# Patient Record
Sex: MALE | Race: WHITE | NOT HISPANIC OR LATINO | Employment: OTHER | URBAN - METROPOLITAN AREA
[De-identification: names, ages, dates, MRNs, and addresses within clinical notes are randomized per-mention and may not be internally consistent; named-entity substitution may affect disease eponyms.]

---

## 2017-01-05 ENCOUNTER — TRANSFERRED RECORDS (OUTPATIENT)
Dept: HEALTH INFORMATION MANAGEMENT | Facility: CLINIC | Age: 77
End: 2017-01-05

## 2017-01-10 ENCOUNTER — TRANSFERRED RECORDS (OUTPATIENT)
Dept: HEALTH INFORMATION MANAGEMENT | Facility: CLINIC | Age: 77
End: 2017-01-10

## 2017-01-17 ENCOUNTER — TRANSFERRED RECORDS (OUTPATIENT)
Dept: HEALTH INFORMATION MANAGEMENT | Facility: CLINIC | Age: 77
End: 2017-01-17

## 2017-01-20 ENCOUNTER — HOSPITAL ENCOUNTER (INPATIENT)
Facility: CLINIC | Age: 77
LOS: 5 days | Discharge: HOME | DRG: 682 | End: 2017-01-25
Attending: HOSPITALIST | Admitting: HOSPITALIST
Payer: MEDICARE

## 2017-01-20 ENCOUNTER — TRANSFERRED RECORDS (OUTPATIENT)
Dept: HEALTH INFORMATION MANAGEMENT | Facility: CLINIC | Age: 77
End: 2017-01-20

## 2017-01-20 DIAGNOSIS — I95.1 ORTHOSTATIC HYPOTENSION: ICD-10-CM

## 2017-01-20 DIAGNOSIS — I10 ESSENTIAL HYPERTENSION: ICD-10-CM

## 2017-01-20 DIAGNOSIS — L97.419 ULCER OF HEEL AND MIDFOOT, RIGHT, WITH UNSPECIFIED SEVERITY (H): ICD-10-CM

## 2017-01-20 DIAGNOSIS — M54.6 ACUTE MIDLINE THORACIC BACK PAIN: Primary | ICD-10-CM

## 2017-01-20 DIAGNOSIS — E11.42 TYPE 2 DIABETES MELLITUS WITH DIABETIC POLYNEUROPATHY, WITHOUT LONG-TERM CURRENT USE OF INSULIN (H): ICD-10-CM

## 2017-01-20 DIAGNOSIS — K59.00 CONSTIPATION, UNSPECIFIED CONSTIPATION TYPE: ICD-10-CM

## 2017-01-20 PROBLEM — N19 RENAL FAILURE: Status: ACTIVE | Noted: 2017-01-20

## 2017-01-20 LAB
ALBUMIN SERPL-MCNC: 2.8 G/DL (ref 3.4–5)
ALBUMIN UR-MCNC: 10 MG/DL
ALP SERPL-CCNC: 100 U/L (ref 40–150)
ALT SERPL W P-5'-P-CCNC: 26 U/L (ref 0–70)
ANION GAP SERPL CALCULATED.3IONS-SCNC: 8 MMOL/L (ref 3–14)
APPEARANCE UR: CLEAR
AST SERPL W P-5'-P-CCNC: 10 U/L (ref 0–45)
BACTERIA #/AREA URNS HPF: ABNORMAL /HPF
BILIRUB SERPL-MCNC: 0.6 MG/DL (ref 0.2–1.3)
BILIRUB UR QL STRIP: NEGATIVE
BUN SERPL-MCNC: 60 MG/DL (ref 7–30)
CALCIUM SERPL-MCNC: 8.8 MG/DL (ref 8.5–10.1)
CHLORIDE SERPL-SCNC: 101 MMOL/L (ref 94–109)
CO2 SERPL-SCNC: 24 MMOL/L (ref 20–32)
COLOR UR AUTO: YELLOW
CREAT SERPL-MCNC: 3.14 MG/DL (ref 0.66–1.25)
CREAT UR-MCNC: 188 MG/DL
DIFFERENTIAL METHOD BLD: ABNORMAL
EOSINOPHIL # BLD AUTO: 0.1 10E9/L (ref 0–0.7)
EOSINOPHIL NFR BLD AUTO: 1 %
ERYTHROCYTE [DISTWIDTH] IN BLOOD BY AUTOMATED COUNT: 15.1 % (ref 10–15)
FRACT EXCRET NA UR+SERPL-RTO: 0.7 %
GFR SERPL CREATININE-BSD FRML MDRD: 20 ML/MIN/1.7M2
GLUCOSE BLDC GLUCOMTR-MCNC: 115 MG/DL (ref 70–99)
GLUCOSE BLDC GLUCOMTR-MCNC: 143 MG/DL (ref 70–99)
GLUCOSE SERPL-MCNC: 113 MG/DL (ref 70–99)
GLUCOSE UR STRIP-MCNC: NEGATIVE MG/DL
HCT VFR BLD AUTO: 27 % (ref 40–53)
HGB BLD-MCNC: 9.1 G/DL (ref 13.3–17.7)
HGB UR QL STRIP: NEGATIVE
HYALINE CASTS #/AREA URNS LPF: 22 /LPF (ref 0–2)
KETONES UR STRIP-MCNC: NEGATIVE MG/DL
LEUKOCYTE ESTERASE UR QL STRIP: NEGATIVE
LYMPHOCYTES # BLD AUTO: 1 10E9/L (ref 0.8–5.3)
LYMPHOCYTES NFR BLD AUTO: 10 %
MCH RBC QN AUTO: 29.2 PG (ref 26.5–33)
MCHC RBC AUTO-ENTMCNC: 33.7 G/DL (ref 31.5–36.5)
MCV RBC AUTO: 87 FL (ref 78–100)
MONOCYTES # BLD AUTO: 0.8 10E9/L (ref 0–1.3)
MONOCYTES NFR BLD AUTO: 8 %
MUCOUS THREADS #/AREA URNS LPF: PRESENT /LPF
NEUTROPHILS # BLD AUTO: 7.8 10E9/L (ref 1.6–8.3)
NEUTROPHILS NFR BLD AUTO: 81 %
NITRATE UR QL: NEGATIVE
PH UR STRIP: 5 PH (ref 5–7)
PLATELET # BLD AUTO: 158 10E9/L (ref 150–450)
POTASSIUM SERPL-SCNC: 4.6 MMOL/L (ref 3.4–5.3)
PROT SERPL-MCNC: 6.6 G/DL (ref 6.8–8.8)
RBC # BLD AUTO: 3.12 10E12/L (ref 4.4–5.9)
RBC #/AREA URNS AUTO: <1 /HPF (ref 0–2)
SODIUM SERPL-SCNC: 133 MMOL/L (ref 133–144)
SODIUM UR-SCNC: 57 MMOL/L
SP GR UR STRIP: 1.01 (ref 1–1.03)
URN SPEC COLLECT METH UR: ABNORMAL
UROBILINOGEN UR STRIP-MCNC: NORMAL MG/DL (ref 0–2)
WBC # BLD AUTO: 9.7 10E9/L (ref 4–11)
WBC #/AREA URNS AUTO: 3 /HPF (ref 0–2)

## 2017-01-20 PROCEDURE — 80053 COMPREHEN METABOLIC PANEL: CPT | Performed by: HOSPITALIST

## 2017-01-20 PROCEDURE — 12000000 ZZH R&B MED SURG/OB

## 2017-01-20 PROCEDURE — 99223 1ST HOSP IP/OBS HIGH 75: CPT | Mod: AI | Performed by: HOSPITALIST

## 2017-01-20 PROCEDURE — 84300 ASSAY OF URINE SODIUM: CPT | Performed by: HOSPITALIST

## 2017-01-20 PROCEDURE — 81001 URINALYSIS AUTO W/SCOPE: CPT | Performed by: HOSPITALIST

## 2017-01-20 PROCEDURE — 25000132 ZZH RX MED GY IP 250 OP 250 PS 637: Mod: GY | Performed by: HOSPITALIST

## 2017-01-20 PROCEDURE — 00000146 ZZHCL STATISTIC GLUCOSE BY METER IP

## 2017-01-20 PROCEDURE — 85025 COMPLETE CBC W/AUTO DIFF WBC: CPT | Performed by: HOSPITALIST

## 2017-01-20 PROCEDURE — 82570 ASSAY OF URINE CREATININE: CPT | Performed by: HOSPITALIST

## 2017-01-20 PROCEDURE — A9270 NON-COVERED ITEM OR SERVICE: HCPCS | Mod: GY | Performed by: HOSPITALIST

## 2017-01-20 PROCEDURE — 25000128 H RX IP 250 OP 636: Performed by: HOSPITALIST

## 2017-01-20 PROCEDURE — 36415 COLL VENOUS BLD VENIPUNCTURE: CPT | Performed by: HOSPITALIST

## 2017-01-20 RX ORDER — CALCIUM CARBONATE 500 MG/1
500-1000 TABLET, CHEWABLE ORAL
Status: DISCONTINUED | OUTPATIENT
Start: 2017-01-20 | End: 2017-01-25 | Stop reason: HOSPADM

## 2017-01-20 RX ORDER — NICOTINE POLACRILEX 4 MG
15-30 LOZENGE BUCCAL
Status: DISCONTINUED | OUTPATIENT
Start: 2017-01-20 | End: 2017-01-25 | Stop reason: HOSPADM

## 2017-01-20 RX ORDER — HYDROCODONE BITARTRATE AND ACETAMINOPHEN 5; 325 MG/1; MG/1
1-2 TABLET ORAL EVERY 4 HOURS PRN
Status: DISCONTINUED | OUTPATIENT
Start: 2017-01-20 | End: 2017-01-25 | Stop reason: HOSPADM

## 2017-01-20 RX ORDER — LIDOCAINE 40 MG/G
CREAM TOPICAL
Status: DISCONTINUED | OUTPATIENT
Start: 2017-01-20 | End: 2017-01-25 | Stop reason: HOSPADM

## 2017-01-20 RX ORDER — AMLODIPINE BESYLATE 5 MG/1
5 TABLET ORAL DAILY
Status: DISCONTINUED | OUTPATIENT
Start: 2017-01-21 | End: 2017-01-25 | Stop reason: HOSPADM

## 2017-01-20 RX ORDER — ASCORBIC ACID 500 MG
500 TABLET ORAL AT BEDTIME
Status: DISCONTINUED | OUTPATIENT
Start: 2017-01-20 | End: 2017-01-25 | Stop reason: HOSPADM

## 2017-01-20 RX ORDER — ATORVASTATIN CALCIUM 40 MG/1
40 TABLET, FILM COATED ORAL AT BEDTIME
Status: DISCONTINUED | OUTPATIENT
Start: 2017-01-20 | End: 2017-01-25 | Stop reason: HOSPADM

## 2017-01-20 RX ORDER — ASPIRIN 81 MG/1
81 TABLET, CHEWABLE ORAL AT BEDTIME
Status: DISCONTINUED | OUTPATIENT
Start: 2017-01-20 | End: 2017-01-25 | Stop reason: HOSPADM

## 2017-01-20 RX ORDER — CLOPIDOGREL BISULFATE 75 MG/1
75 TABLET ORAL DAILY
Status: DISCONTINUED | OUTPATIENT
Start: 2017-01-21 | End: 2017-01-25 | Stop reason: HOSPADM

## 2017-01-20 RX ORDER — TAMSULOSIN HYDROCHLORIDE 0.4 MG/1
0.8 CAPSULE ORAL AT BEDTIME
Status: DISCONTINUED | OUTPATIENT
Start: 2017-01-20 | End: 2017-01-25 | Stop reason: HOSPADM

## 2017-01-20 RX ORDER — BISACODYL 10 MG
10 SUPPOSITORY, RECTAL RECTAL DAILY PRN
Status: DISCONTINUED | OUTPATIENT
Start: 2017-01-20 | End: 2017-01-25 | Stop reason: HOSPADM

## 2017-01-20 RX ORDER — ZOLPIDEM TARTRATE 5 MG/1
5 TABLET ORAL
Status: DISCONTINUED | OUTPATIENT
Start: 2017-01-20 | End: 2017-01-25 | Stop reason: HOSPADM

## 2017-01-20 RX ORDER — AMOXICILLIN 250 MG
1-2 CAPSULE ORAL 2 TIMES DAILY PRN
Status: DISCONTINUED | OUTPATIENT
Start: 2017-01-20 | End: 2017-01-25 | Stop reason: HOSPADM

## 2017-01-20 RX ORDER — ONDANSETRON 4 MG/1
4 TABLET, ORALLY DISINTEGRATING ORAL EVERY 6 HOURS PRN
Status: DISCONTINUED | OUTPATIENT
Start: 2017-01-20 | End: 2017-01-25 | Stop reason: HOSPADM

## 2017-01-20 RX ORDER — DEXTROSE MONOHYDRATE 25 G/50ML
25-50 INJECTION, SOLUTION INTRAVENOUS
Status: DISCONTINUED | OUTPATIENT
Start: 2017-01-20 | End: 2017-01-25 | Stop reason: HOSPADM

## 2017-01-20 RX ORDER — ACETAMINOPHEN 650 MG/1
650 SUPPOSITORY RECTAL EVERY 4 HOURS PRN
Status: DISCONTINUED | OUTPATIENT
Start: 2017-01-20 | End: 2017-01-25 | Stop reason: HOSPADM

## 2017-01-20 RX ORDER — ACETAMINOPHEN 325 MG/1
650 TABLET ORAL EVERY 4 HOURS PRN
Status: DISCONTINUED | OUTPATIENT
Start: 2017-01-20 | End: 2017-01-25 | Stop reason: HOSPADM

## 2017-01-20 RX ORDER — NALOXONE HYDROCHLORIDE 0.4 MG/ML
.1-.4 INJECTION, SOLUTION INTRAMUSCULAR; INTRAVENOUS; SUBCUTANEOUS
Status: DISCONTINUED | OUTPATIENT
Start: 2017-01-20 | End: 2017-01-25 | Stop reason: HOSPADM

## 2017-01-20 RX ORDER — ONDANSETRON 2 MG/ML
4 INJECTION INTRAMUSCULAR; INTRAVENOUS EVERY 6 HOURS PRN
Status: DISCONTINUED | OUTPATIENT
Start: 2017-01-20 | End: 2017-01-25 | Stop reason: HOSPADM

## 2017-01-20 RX ORDER — POLYETHYLENE GLYCOL 3350 17 G/17G
17 POWDER, FOR SOLUTION ORAL DAILY PRN
Status: DISCONTINUED | OUTPATIENT
Start: 2017-01-20 | End: 2017-01-25 | Stop reason: HOSPADM

## 2017-01-20 RX ORDER — TRAMADOL HYDROCHLORIDE 50 MG/1
50-100 TABLET ORAL 3 TIMES DAILY PRN
Status: DISCONTINUED | OUTPATIENT
Start: 2017-01-20 | End: 2017-01-20

## 2017-01-20 RX ORDER — SODIUM CHLORIDE 9 MG/ML
INJECTION, SOLUTION INTRAVENOUS CONTINUOUS
Status: DISCONTINUED | OUTPATIENT
Start: 2017-01-20 | End: 2017-01-22

## 2017-01-20 RX ORDER — HYDROMORPHONE HYDROCHLORIDE 1 MG/ML
.3-.5 INJECTION, SOLUTION INTRAMUSCULAR; INTRAVENOUS; SUBCUTANEOUS
Status: DISCONTINUED | OUTPATIENT
Start: 2017-01-20 | End: 2017-01-25 | Stop reason: HOSPADM

## 2017-01-20 RX ORDER — ATENOLOL 25 MG/1
25 TABLET ORAL 2 TIMES DAILY
Status: DISCONTINUED | OUTPATIENT
Start: 2017-01-20 | End: 2017-01-24

## 2017-01-20 RX ADMIN — TAMSULOSIN HYDROCHLORIDE 0.8 MG: 0.4 CAPSULE ORAL at 22:18

## 2017-01-20 RX ADMIN — ATENOLOL 25 MG: 25 TABLET ORAL at 22:18

## 2017-01-20 RX ADMIN — OXYCODONE HYDROCHLORIDE AND ACETAMINOPHEN 500 MG: 500 TABLET ORAL at 22:18

## 2017-01-20 RX ADMIN — ATORVASTATIN CALCIUM 40 MG: 40 TABLET, FILM COATED ORAL at 22:18

## 2017-01-20 RX ADMIN — ACETAMINOPHEN 650 MG: 325 TABLET, FILM COATED ORAL at 19:27

## 2017-01-20 RX ADMIN — SODIUM CHLORIDE: 9 INJECTION, SOLUTION INTRAVENOUS at 19:08

## 2017-01-20 RX ADMIN — ASPIRIN 81 MG 81 MG: 81 TABLET ORAL at 22:18

## 2017-01-20 ASSESSMENT — ACTIVITIES OF DAILY LIVING (ADL)
TOILETING: 0-->INDEPENDENT
DRESS: 0-->INDEPENDENT
COGNITION: 0 - NO COGNITION ISSUES REPORTED
RETIRED_EATING: 0-->INDEPENDENT
BATHING: 0-->INDEPENDENT
TRANSFERRING: 0-->INDEPENDENT
NUMBER_OF_TIMES_PATIENT_HAS_FALLEN_WITHIN_LAST_SIX_MONTHS: 2
SWALLOWING: 0-->SWALLOWS FOODS/LIQUIDS WITHOUT DIFFICULTY
AMBULATION: 0-->INDEPENDENT
RETIRED_COMMUNICATION: 0-->UNDERSTANDS/COMMUNICATES WITHOUT DIFFICULTY
FALL_HISTORY_WITHIN_LAST_SIX_MONTHS: YES

## 2017-01-20 NOTE — IP AVS SNAPSHOT
MRN:[id number]                      After Visit Summary   1/20/2017    Tad Manning    MRN: [id number]           Thank you!     Thank you for choosing [facility] for your care. Our goal is always to provide you with excellent care. Hearing back from our patients is one way we can continue to improve our services. Please take a few minutes to complete the written survey that you may receive in the mail after you visit with us. Thank you!        Patient Information     Date Of Birth          1940        About your hospital stay     You were admitted on:  January 20, 2017 You last received care in the:  [place] [address] [facility]    You were discharged on:  January 25, 2017        Reason for your hospital stay       Acute renal failure, dizziness.                  Who to Call     For medical emergencies, please call 911.  For non-urgent questions about your medical care, please call your primary care provider or clinic, [phone]          Attending Provider     Provider    Patria Dee MD Anderson, Kalia Grey, DO       Primary Care Provider Office Phone # Fax #    Gene Guevara [phone]       [place] [address]         When to contact your care team       Call your primary doctor if you have any of the following: persistent dizziness.                  After Care Instructions     Activity       Your activity upon discharge: activity as tolerated            Diet       Follow this diet upon discharge: Orders Placed This Encounter  Snacks/Supplements Adult: Nepro Oral Supplement; Between Meals  Moderate Consistent Carbohydrate Diet                  Follow-up Appointments     Follow-up and recommended labs and tests        Follow up with primary care provider, Gene Guevara, within 7-10 days to evaluate medication change and for hospital follow- up.  The following labs/tests are recommended: CBC, BMP.    Follow up with  "Dr England in 2-4 weeks as recommended                  Your next 10 appointments already scheduled     2017  2:00 PM   [facility]    [address]  [facility] [id number] [phone]            2017  9:30 AM   Return Visit with Enzo Alonso DPM   [facility]    [address] [facility] [address] [phone]              Pending Results     No orders found from 2017 to 2017.            Statement of Approval     Ordered          17 0917  I have reviewed and agree with all the recommendations and orders detailed in this document.   EFFECTIVE NOW     Approved and electronically signed by:  Patria Dee MD             Admission Information        Provider Department Dept Phone    2017 Kalia Barrientos, DO  [facility] [phone]      Your Vitals Were     Blood Pressure Pulse Temperature Respirations Weight Pulse Oximetry    147/76 mmHg 70 98.3  F (36.8  C) (Oral) 16 85.7 kg (188 lb 15 oz) 95%      MyChart Information     [facility] lets you send messages to your doctor, view your test results, renew your prescriptions, schedule appointments and more. To sign up, go to [email or web address] . Click on \"Log in\" on the left side of the screen, which will take you to the Welcome page. Then click on \"Sign up Now\" on the right side of the page.     You will be asked to enter the access code listed below, as well as some personal information. Please follow the directions to create your username and password.     Your access code is: [email or web address]  Expires: 2017  4:04 PM     Your access code will  in 90 days. If you need help or a new code, please call your [facility] clinic or [phone].        Care EveryWhere ID     This is your Care EveryWhere ID. This could be used by other organizations to access your [facility] " medical records  [id number]           Review of your medicines      START taking        Dose / Directions    bacitracin-polymyxin b ointment   Commonly known as:  POLYSPORIN   Used for:  Ulcer of heel and midfoot, right, with unspecified severity (H)        Apply topically 2 times daily   Quantity:  15 g   Refills:  0       midodrine 2.5 MG tablet   Commonly known as:  PROAMATINE   Used for:  Orthostatic hypotension        Dose:  2.5 mg   Take 1 tablet (2.5 mg) by mouth 2 times daily   Quantity:  60 tablet   Refills:  0       polyethylene glycol Packet   Commonly known as:  MIRALAX/GLYCOLAX   Used for:  Constipation, unspecified constipation type        Dose:  17 g   Take 17 g by mouth daily as needed for constipation   Quantity:  7 packet   Refills:  0       senna-docusate 8.6-50 MG per tablet   Commonly known as:  SENOKOT-S;PERICOLACE   Used for:  Constipation, unspecified constipation type        Dose:  1-2 tablet   Take 1-2 tablets by mouth 2 times daily as needed (constipation )   Quantity:  100 tablet   Refills:  0         CONTINUE these medicines which may have CHANGED, or have new prescriptions. If we are uncertain of the size of tablets/capsules you have at home, strength may be listed as something that might have changed.        Dose / Directions    atenolol 25 MG tablet   Commonly known as:  TENORMIN   This may have changed:  when to take this   Used for:  Essential hypertension        Dose:  25 mg   Take 1 tablet (25 mg) by mouth daily   Quantity:  30 tablet   Refills:  0       glipiZIDE 5 MG tablet   Commonly known as:  GLUCOTROL   This may have changed:    - medication strength  - how much to take   Used for:  Type 2 diabetes mellitus with diabetic polyneuropathy, without long-term current use of insulin (H)        Dose:  2.5 mg   Take 0.5 tablets (2.5 mg) by mouth 2 times daily (before meals)   Quantity:  30 tablet   Refills:  0       traMADol 50 MG tablet   Commonly known as:  ULTRAM   This may  have changed:    - how much to take  - when to take this  - reasons to take this   Used for:  Acute midline thoracic back pain        Dose:  50 mg   Take 1 tablet (50 mg) by mouth every 6 hours as needed   Quantity:  40 tablet   Refills:  0         CONTINUE these medicines which have NOT CHANGED        Dose / Directions    AMLODIPINE BESYLATE PO        Dose:  5 mg   Take 5 mg by mouth daily   Refills:  0       ASPIRIN PO        Dose:  81 mg   Take 81 mg by mouth daily.   Refills:  0       atorvastatin 40 MG tablet   Commonly known as:  LIPITOR   Used for:  Claudication of left lower extremity (H)        Dose:  40 mg   Take 1 tablet by mouth daily.   Quantity:  30 tablet   Refills:  1       clopidogrel 75 MG tablet   Commonly known as:  PLAVIX   Used for:  Critical lower limb ischemia        Dose:  75 mg   Take 1 tablet (75 mg) by mouth daily   Quantity:  90 tablet   Refills:  3       liraglutide 18 MG/3ML soln   Commonly known as:  VICTOZA        Dose:  1.2 mg   Inject 1.2 mg Subcutaneous daily   Refills:  0       MAGNESIUM PO        Dose:  250 mg   Take 250 mg by mouth daily   Refills:  0       NITROQUICK SL        Dose:  0.4 mg   Place 0.4 mg under the tongue as needed.   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), Diabetic ulcer of left foot due to type 2 diabetes mellitus (H)        Equipment being ordered: DH2 shoe   Quantity:  1 Device   Refills:  0       tamsulosin 0.4 MG capsule   Commonly known as:  FLOMAX        Dose:  0.8 mg   Take 0.8 mg by mouth At Bedtime   Refills:  0       VITAMIN C PO        Dose:  500 mg   Take 500 mg by mouth daily.   Refills:  0       VITAMIN D PO        Dose:  1000 Units   Take 1,000 Units by mouth daily.   Refills:  0       ZOLPIDEM TARTRATE PO        Dose:  5 mg   Take 5 mg by mouth nightly as needed   Refills:  0         STOP taking     diphenhydrAMINE-acetaminophen  MG tablet   Commonly known as:  TYLENOL PM            IBUPROFEN PO           LISINOPRIL PO           lisinopril-hydrochlorothiazide 20-25 MG per tablet   Commonly known as:  PRINZIDE/ZESTORETIC           METFORMIN HCL PO                Where to get your medicines      These medications were sent to [facility] [place] - [address]  [address] [place] [address]     Phone:  [phone]    - atenolol 25 MG tablet  - bacitracin-polymyxin b ointment  - glipiZIDE 5 MG tablet  - midodrine 2.5 MG tablet  - polyethylene glycol Packet  - senna-docusate 8.6-50 MG per tablet      Some of these will need a paper prescription and others can be bought over the counter. Ask your nurse if you have questions.     Bring a paper prescription for each of these medications    - traMADol 50 MG tablet             Protect others around you: Learn how to safely use, store and throw away your medicines at www.disposemymeds.org.             Medication List: This is a list of all your medications and when to take them. Check marks below indicate your daily home schedule. Keep this list as a reference.      Medications           Morning Afternoon Evening Bedtime As Needed    AMLODIPINE BESYLATE PO   Take 5 mg by mouth daily   Last time this was given:  5 mg on 1/25/2017  7:39 AM                                   ASPIRIN PO   Take 81 mg by mouth daily.   Last time this was given:  81 mg on 1/24/2017  9:54 PM                                   atenolol 25 MG tablet   Commonly known as:  TENORMIN   Take 1 tablet (25 mg) by mouth daily   Last time this was given:  25 mg on 1/25/2017  7:39 AM                                   atorvastatin 40 MG tablet   Commonly known as:  LIPITOR   Take 1 tablet by mouth daily.   Last time this was given:  40 mg on 1/24/2017  9:54 PM                                   bacitracin-polymyxin b ointment   Commonly known as:  POLYSPORIN   Apply topically 2 times daily   Last time this was given:  1/25/2017  7:53 AM                                       clopidogrel 75 MG tablet   Commonly known as:  PLAVIX   Take 1 tablet (75 mg) by mouth daily   Last time this was given:  75 mg on 1/25/2017  7:39 AM                                   glipiZIDE 5 MG tablet   Commonly known as:  GLUCOTROL   Take 0.5 tablets (2.5 mg) by mouth 2 times daily (before meals)   Last time this was given:  2.5 mg on 1/25/2017  7:39 AM                                      liraglutide 18 MG/3ML soln   Commonly known as:  VICTOZA   Inject 1.2 mg Subcutaneous daily   Last time this was given:  1.2 mg on 1/25/2017  7:44 AM                                   MAGNESIUM PO   Take 250 mg by mouth daily                                   midodrine 2.5 MG tablet   Commonly known as:  PROAMATINE   Take 1 tablet (2.5 mg) by mouth 2 times daily   Last time this was given:  2.5 mg on 1/25/2017 11:28 AM                                      NITROQUICK SL   Place 0.4 mg under the tongue as needed.                                   order for DME   Equipment being ordered: DH2 shoe                                polyethylene glycol Packet   Commonly known as:  MIRALAX/GLYCOLAX   Take 17 g by mouth daily as needed for constipation                                   senna-docusate 8.6-50 MG per tablet   Commonly known as:  SENOKOT-S;PERICOLACE   Take 1-2 tablets by mouth 2 times daily as needed (constipation )   Last time this was given:  1 tablet on 1/25/2017  7:51 AM                                   tamsulosin 0.4 MG capsule   Commonly known as:  FLOMAX   Take 0.8 mg by mouth At Bedtime   Last time this was given:  0.8 mg on 1/24/2017  9:54 PM                                   traMADol 50 MG tablet   Commonly known as:  ULTRAM   Take 1 tablet (50 mg) by mouth every 6 hours as needed   Last time this was given:  50 mg on 1/25/2017  7:50 AM                                   VITAMIN C PO   Take 500 mg by mouth daily.   Last time this was given:  500 mg on 1/24/2017  9:54 PM                                    VITAMIN D PO   Take 1,000 Units by mouth daily.   Last time this was given:  1,000 Units on 1/25/2017  7:38 AM                                   ZOLPIDEM TARTRATE PO   Take 5 mg by mouth nightly as needed                                             More Information        Fall Due to Dizziness, Weakness, or Loss of Balance  The symptoms that led to your fall have been evaluated. Your doctor feels it is safe for you to return home.  Many things can cause you to become dizzy. Everyone means a little something different by the word dizzy. People may describe their symptoms using these words:    It doesn t feel right in my head    It feels like spinning in my head    It seems like the room is spinning    My balance feels off    I feel lightheaded, like I am going to pass out  All these descriptions can have real causes.     Your balance mechanism is in your inner ear. Anything disturbing it can make you feel dizzy, whether it is from a cold, an injury, or many other things. Anything that causes your blood pressure to drop suddenly can make you feel lightheaded, or like you are going to faint. This is because at that moment there might not be enough blood flowing to your brain. Causes include:    Medicines    Dehydration    Standing up or bending over too quickly    Becoming overheated    Taking a hot shower or bath    Straining hard while lifting something or using the toilet    Strokes, heart attack, heart valve disease, very slow or very fast heart rate    Low blood sugar    Ear infection    Hyperventilation    Anemia    Trauma    Infection    Panic attack    Pregnancy  You may be at risk of repeat falls. Take precautions described below to prevent another fall.  Home care    If you become lightheaded or dizzy, lie down immediately or sit and lean forward with your head down. It is better to do this than fall and seriously hurt or injure yourself.    Rest today. When changing position, take a moment to  be sure any dizziness goes away before standing and walking.    If you have been prescribed a walker, be sure to use it whenever you walk, even if it is a short distance.    If you were injured during the fall, follow the advice from your doctor regarding care of your injury.    Be sure your doctor knows all the medicines, herbs, and supplements you take. Some medicines can cause dizziness.  Follow-up care  Unless given other advice, call your doctor on the next office day to advise of your fall and to schedule an appointment. You may require further treatment for the underlying condition that caused today s fall.  If X-ray or a CT scan were done, you will be notified of the results, especially if it affects treatment.  Call 911  Call 911 if any of these occur:    Trouble breathing    Confused or difficulty arousing    Fainting or loss of consciousness    Rapid or very slow heart rate    Seizure    Difficulty with speech or vision, weakness of an arm or leg    Difficulty walking or talking, loss of balance    Numbness or weakness in one side of your body, facial droop  When to seek medical advice  Call your healthcare provider right away if any of the following occur:    Another fall    Continued dizzy spells    Severe headache    Blood in vomit or stools (black or red color)    5825-6154 [employer]. [address] [place] [address], [place] [address]. All rights reserved. This information is not intended as a substitute for professional medical care. Always follow your healthcare professional's instructions.

## 2017-01-20 NOTE — IP AVS SNAPSHOT
[address], [address]    [facility] [address]    Phone:  [phone]                                       After Visit Summary   1/20/2017    Tad Manning    MRN: [id number]           After Visit Summary Signature Page     I have received my discharge instructions, and my questions have been answered. I have discussed any challenges I see with this plan with the nurse or doctor.    ..........................................................................................................................................  Patient/Patient Representative Signature      ..........................................................................................................................................  Patient Representative Print Name and Relationship to Patient    ..................................................               ................................................  Date                                            Time    ..........................................................................................................................................  Reviewed by Signature/Title    ...................................................              ..............................................  Date                                                            Time

## 2017-01-21 ENCOUNTER — APPOINTMENT (OUTPATIENT)
Dept: ULTRASOUND IMAGING | Facility: CLINIC | Age: 77
DRG: 682 | End: 2017-01-21
Attending: HOSPITALIST
Payer: MEDICARE

## 2017-01-21 ENCOUNTER — APPOINTMENT (OUTPATIENT)
Dept: CARDIOLOGY | Facility: CLINIC | Age: 77
DRG: 682 | End: 2017-01-21
Attending: HOSPITALIST
Payer: MEDICARE

## 2017-01-21 ENCOUNTER — APPOINTMENT (OUTPATIENT)
Dept: PHYSICAL THERAPY | Facility: CLINIC | Age: 77
DRG: 682 | End: 2017-01-21
Attending: HOSPITALIST
Payer: MEDICARE

## 2017-01-21 LAB
ALBUMIN SERPL-MCNC: 2.7 G/DL (ref 3.4–5)
ALP SERPL-CCNC: 101 U/L (ref 40–150)
ALT SERPL W P-5'-P-CCNC: 23 U/L (ref 0–70)
ANION GAP SERPL CALCULATED.3IONS-SCNC: 11 MMOL/L (ref 3–14)
AST SERPL W P-5'-P-CCNC: 11 U/L (ref 0–45)
BILIRUB SERPL-MCNC: 0.7 MG/DL (ref 0.2–1.3)
BUN SERPL-MCNC: 52 MG/DL (ref 7–30)
CALCIUM SERPL-MCNC: 9.3 MG/DL (ref 8.5–10.1)
CHLORIDE SERPL-SCNC: 103 MMOL/L (ref 94–109)
CK SERPL-CCNC: 47 U/L (ref 30–300)
CO2 SERPL-SCNC: 23 MMOL/L (ref 20–32)
CREAT SERPL-MCNC: 2.43 MG/DL (ref 0.66–1.25)
ERYTHROCYTE [DISTWIDTH] IN BLOOD BY AUTOMATED COUNT: 15.2 % (ref 10–15)
GFR SERPL CREATININE-BSD FRML MDRD: 26 ML/MIN/1.7M2
GLUCOSE BLDC GLUCOMTR-MCNC: 112 MG/DL (ref 70–99)
GLUCOSE BLDC GLUCOMTR-MCNC: 152 MG/DL (ref 70–99)
GLUCOSE BLDC GLUCOMTR-MCNC: 179 MG/DL (ref 70–99)
GLUCOSE SERPL-MCNC: 183 MG/DL (ref 70–99)
HBA1C MFR BLD: 7 % (ref 4.3–6)
HCT VFR BLD AUTO: 26.5 % (ref 40–53)
HGB BLD-MCNC: 8.9 G/DL (ref 13.3–17.7)
IRON SATN MFR SERPL: 15 % (ref 15–46)
IRON SERPL-MCNC: 28 UG/DL (ref 35–180)
MCH RBC QN AUTO: 29.2 PG (ref 26.5–33)
MCHC RBC AUTO-ENTMCNC: 33.6 G/DL (ref 31.5–36.5)
MCV RBC AUTO: 87 FL (ref 78–100)
PLATELET # BLD AUTO: 151 10E9/L (ref 150–450)
POTASSIUM SERPL-SCNC: 4.8 MMOL/L (ref 3.4–5.3)
PROT SERPL-MCNC: 6.6 G/DL (ref 6.8–8.8)
RBC # BLD AUTO: 3.05 10E12/L (ref 4.4–5.9)
SODIUM SERPL-SCNC: 137 MMOL/L (ref 133–144)
TIBC SERPL-MCNC: 182 UG/DL (ref 240–430)
VIT B12 SERPL-MCNC: 404 PG/ML (ref 193–986)
WBC # BLD AUTO: 6.7 10E9/L (ref 4–11)

## 2017-01-21 PROCEDURE — 85027 COMPLETE CBC AUTOMATED: CPT | Performed by: HOSPITALIST

## 2017-01-21 PROCEDURE — 25000132 ZZH RX MED GY IP 250 OP 250 PS 637: Mod: GY | Performed by: HOSPITALIST

## 2017-01-21 PROCEDURE — 82607 VITAMIN B-12: CPT | Performed by: HOSPITALIST

## 2017-01-21 PROCEDURE — 93306 TTE W/DOPPLER COMPLETE: CPT | Mod: 26 | Performed by: INTERNAL MEDICINE

## 2017-01-21 PROCEDURE — A9270 NON-COVERED ITEM OR SERVICE: HCPCS | Mod: GY | Performed by: HOSPITALIST

## 2017-01-21 PROCEDURE — 80053 COMPREHEN METABOLIC PANEL: CPT | Performed by: HOSPITALIST

## 2017-01-21 PROCEDURE — 83550 IRON BINDING TEST: CPT | Performed by: HOSPITALIST

## 2017-01-21 PROCEDURE — 12000000 ZZH R&B MED SURG/OB

## 2017-01-21 PROCEDURE — 82550 ASSAY OF CK (CPK): CPT | Performed by: HOSPITALIST

## 2017-01-21 PROCEDURE — 25000128 H RX IP 250 OP 636: Performed by: HOSPITALIST

## 2017-01-21 PROCEDURE — 83036 HEMOGLOBIN GLYCOSYLATED A1C: CPT | Performed by: HOSPITALIST

## 2017-01-21 PROCEDURE — 97161 PT EVAL LOW COMPLEX 20 MIN: CPT | Mod: GP | Performed by: PHYSICAL THERAPIST

## 2017-01-21 PROCEDURE — 40000264 ECHO COMPLETE WITH OPTISON

## 2017-01-21 PROCEDURE — 83540 ASSAY OF IRON: CPT | Performed by: HOSPITALIST

## 2017-01-21 PROCEDURE — 00000146 ZZHCL STATISTIC GLUCOSE BY METER IP

## 2017-01-21 PROCEDURE — 76770 US EXAM ABDO BACK WALL COMP: CPT

## 2017-01-21 PROCEDURE — 25500064 ZZH RX 255 OP 636: Performed by: HOSPITALIST

## 2017-01-21 PROCEDURE — 99233 SBSQ HOSP IP/OBS HIGH 50: CPT | Performed by: HOSPITALIST

## 2017-01-21 PROCEDURE — 36415 COLL VENOUS BLD VENIPUNCTURE: CPT | Performed by: HOSPITALIST

## 2017-01-21 PROCEDURE — 40000193 ZZH STATISTIC PT WARD VISIT: Performed by: PHYSICAL THERAPIST

## 2017-01-21 PROCEDURE — 25000125 ZZHC RX 250: Performed by: HOSPITALIST

## 2017-01-21 PROCEDURE — 97116 GAIT TRAINING THERAPY: CPT | Mod: GP | Performed by: PHYSICAL THERAPIST

## 2017-01-21 RX ORDER — BACITRACIN ZINC AND POLYMYXIN B SULFATE 500; 1000 [USP'U]/G; [USP'U]/G
OINTMENT TOPICAL 2 TIMES DAILY
Status: DISCONTINUED | OUTPATIENT
Start: 2017-01-21 | End: 2017-01-25 | Stop reason: HOSPADM

## 2017-01-21 RX ADMIN — AMLODIPINE BESYLATE 5 MG: 5 TABLET ORAL at 09:41

## 2017-01-21 RX ADMIN — ACETAMINOPHEN 650 MG: 325 TABLET, FILM COATED ORAL at 19:01

## 2017-01-21 RX ADMIN — SODIUM CHLORIDE: 9 INJECTION, SOLUTION INTRAVENOUS at 07:09

## 2017-01-21 RX ADMIN — CLOPIDOGREL BISULFATE 75 MG: 75 TABLET, FILM COATED ORAL at 09:41

## 2017-01-21 RX ADMIN — HUMAN ALBUMIN MICROSPHERES AND PERFLUTREN 3 ML: 10; .22 INJECTION, SOLUTION INTRAVENOUS at 13:17

## 2017-01-21 RX ADMIN — CALCIUM CARBONATE (ANTACID) CHEW TAB 500 MG 1000 MG: 500 CHEW TAB at 00:09

## 2017-01-21 RX ADMIN — ATENOLOL 25 MG: 25 TABLET ORAL at 09:41

## 2017-01-21 RX ADMIN — OXYCODONE HYDROCHLORIDE AND ACETAMINOPHEN 500 MG: 500 TABLET ORAL at 22:08

## 2017-01-21 RX ADMIN — TAMSULOSIN HYDROCHLORIDE 0.8 MG: 0.4 CAPSULE ORAL at 22:08

## 2017-01-21 RX ADMIN — ATENOLOL 25 MG: 25 TABLET ORAL at 22:08

## 2017-01-21 RX ADMIN — INSULIN ASPART 1 UNITS: 100 INJECTION, SOLUTION INTRAVENOUS; SUBCUTANEOUS at 18:56

## 2017-01-21 RX ADMIN — INSULIN ASPART 1 UNITS: 100 INJECTION, SOLUTION INTRAVENOUS; SUBCUTANEOUS at 09:40

## 2017-01-21 RX ADMIN — Medication 1 SPRAY: at 18:58

## 2017-01-21 RX ADMIN — SODIUM CHLORIDE: 9 INJECTION, SOLUTION INTRAVENOUS at 17:35

## 2017-01-21 RX ADMIN — Medication 1 SPRAY: at 14:02

## 2017-01-21 RX ADMIN — VITAMIN D, TAB 1000IU (100/BT) 1000 UNITS: 25 TAB at 13:57

## 2017-01-21 RX ADMIN — ATORVASTATIN CALCIUM 40 MG: 40 TABLET, FILM COATED ORAL at 22:08

## 2017-01-21 RX ADMIN — ONDANSETRON 4 MG: 4 TABLET, ORALLY DISINTEGRATING ORAL at 09:48

## 2017-01-21 RX ADMIN — BACITRACIN ZINC AND POLYMYXIN B SULFATE: 500; 10000 OINTMENT TOPICAL at 23:01

## 2017-01-21 RX ADMIN — CALCIUM CARBONATE (ANTACID) CHEW TAB 500 MG 1000 MG: 500 CHEW TAB at 03:28

## 2017-01-21 RX ADMIN — ASPIRIN 81 MG 81 MG: 81 TABLET ORAL at 22:08

## 2017-01-21 NOTE — PROGRESS NOTES
[facility]    Hospitalist Progress Note    Date of Service (when I saw the patient): 01/21/2017    Assessment and Plan  Tad Manning is a 76 year old male who presents with renal failure from outside facility. Admitted for further evaluation and treatment.     Acute renal failure: Patient with a creatinine of 1.57 on 10 January 2007, increased to 4.2 on January 20, 2017.              - Ultrasound of the kidneys normal, no hydronephrosis              - Fractional excretion of sodium <1 indicating pre renal.              - Urinalysis shows albumin in urine, and hyaline casts.              - Avoid nephrotoxins              - IV fluids- increase to 125cc/hr, still appears dry.   - check CK level              - Nephrology consultation requested, await recommendations.              - May need Dugan catheter if retaining urine.              - Bladder scan and catheterization as clinically indicated.   - Creatinine has trended down 4.2-->3.14-->2.43, continue to monitor.    Hypotension, syncope: Previously with lightheadedness and syncope in January and resultant back injury.  most likely due to orthostatic hypotension. As per patient, his BP meds were decreased by his PCP likely due to hypotension and dizziness.              - Echocardiogram pending              - Telemetry- no arrhythmias              - Orthostatics to follow              - IVF as above.   - Therapy eval.    Cardiac and coronary artery disease and hypertension and hyperlipidemia: Patient with history of triple bypass in 1995 per report, as well as hypertension, hyperlipidemia, peripheral vascular disease, coronary artery disease.              - Hold PTA Lisinopril and HCTZ              - Continue PTA Amlodipine 5 mg daily, Plavix 75 mg daily Lipitor 40 mg daily ASA 81mg QD and atenolol 25 mg BID              - Continue to monitor on telemetry.      Diabetes mellitus: Patient maintained on multiple medications as an  outpatient.              - Insulin sliding scale, BS in 100s mostly              - Hold PTA Victoza              - Hold PTA Metformin              - Hold PTA Glipizide    Possible history of CLL: Patient with history of chronic lymphocytic leukemia in remission per report, however, further chart review necessary.              - Monitor.    History of BPH: Stable.  Maintained on Flomax as an outpatient.              - PTA Flomax resumed    Back pain, history of fall in early January: Patient with x-rays from outside as well as CT of the abdomen without explicit fracture, see reports from care everywhere for further details.              - Pain control.              - PTA tramadol              - Hold PTA Methocarbamol    H/o Insomnia: Maintained on zolpidem prior to admission.                - PTA Zolpidem    DVT Prophylaxis: Pneumatic Compression Devices  Code Status: Full Code    Disposition: Expected discharge once renal failure improves, likely 2 days. Plan discussed with patient.    Patria Dee MD  hospitalist    Interval History  Patient was seen and examined, dizziness has improved today. No headache or nausea.   - Back pain controlled with medication.      -Data reviewed today: I reviewed all new labs and imaging results over the last 24 hours. I personally reviewed the EKG tracing showing sinus, no tachy or bradyarrhythmias on tele.    Renal US:      1. No hydronephrosis.  2. Bilateral renal cysts.  3. Enlarged prostate versus mass indenting the bladder floor.      Physical Exam  Temp: 98.5  F (36.9  C) Temp src: Oral BP: 127/60 mmHg Pulse: 70 Heart Rate: 79 Resp: 18 SpO2: 96 % O2 Device: None (Room air)    There were no vitals filed for this visit.  Vital Signs with Ranges  Temp:  [97.3  F (36.3  C)-98.6  F (37  C)] 98.5  F (36.9  C)  Pulse:  [70-89] 70  Heart Rate:  [79-86] 79  Resp:  [18] 18  BP: (120-132)/(59-64) 127/60 mmHg  SpO2:  [94 %-96 %] 96 %  I/O last 3 completed shifts:  In: 487 [P.O.:240;  I.V.:247]  Out: 1150 [Urine:1150]    Constitutional: Alert, awake. Comfortable  HEENT: PERRLA, EOMI, dry oral mucosa  Respiratory: Bilateral equal air entry, clear to auscultation. No respiratory distress.  Cardiovascular: Regular s1s2, no murmur, rub or gallop. No tachycardia.  GI: Soft, non distended, non tender, bowel tones active.  Skin/Integumen: No rash, no blister.       Medications    NaCl 125 mL/hr at 01/21/17 1245       sodium chloride (PF)  3 mL Intracatheter Q8H     amLODIPine (NORVASC) tablet 5 mg  5 mg Oral Daily     ascorbic acid (VITAMIN C) tablet 500 mg  500 mg Oral At Bedtime     atenolol (TENORMIN) tablet 25 mg  25 mg Oral BID     atorvastatin  40 mg Oral At Bedtime     clopidogrel  75 mg Oral Daily     aspirin chewable tablet 81 mg  81 mg Oral At Bedtime     tamsulosin  0.8 mg Oral At Bedtime     insulin aspart  1-7 Units Subcutaneous TID AC     insulin aspart  1-5 Units Subcutaneous At Bedtime       Data    Recent Labs  Lab 01/21/17  0740 01/20/17  2040   WBC 6.7 9.7   HGB 8.9* 9.1*   MCV 87 87    158    133   POTASSIUM 4.8 4.6   CHLORIDE 103 101   CO2 23 24   BUN 52* 60*   CR 2.43* 3.14*   ANIONGAP 11 8   JUSTINO 9.3 8.8   * 113*   ALBUMIN 2.7* 2.8*   PROTTOTAL 6.6* 6.6*   BILITOTAL 0.7 0.6   ALKPHOS 101 100   ALT 23 26   AST 11 10       Recent Results (from the past 24 hour(s))   US Renal Complete    Narrative    US RENAL COMPLETE 1/21/2017 8:15 AM     HISTORY: Assess for evidence of obstruction, mass, kidney size. No IV  contrast.    FINDINGS: The right kidney measured 12.7 cm in length with a cortical  thickness of 1.4 cm, and the left kidney 13.3 cm in length with a  cortical thickness of 1.8 cm. Bilateral renal cysts are noted  measuring 3.0 cm and 2.7 cm on the right and 5.2 cm on the left. Renal  cortical echogenicity appeared grossly normal. No hydronephrosis was  demonstrated. The urinary bladder was only partly distended. Enlarged  prostate versus 4.7 cm mass appears  to indent the floor of the  bladder.      Impression    IMPRESSION:  1. No hydronephrosis.  2. Bilateral renal cysts.  3. Enlarged prostate versus mass indenting the bladder floor.

## 2017-01-21 NOTE — H&P
[facility]    History and Physical  Hospitalist       Date of Admission:  1/20/2017  Date of Service (when I saw the patient): 01/20/2017    Assessment and Plan  Tad Manning is a 76 year old male who presents with renal failure from outside facility. Admitted for further evaluation and treatment.     Acute renal failure: Patient with a creatinine of 1.57 on 10 January 2007, increased to 4.2 on January 20, 2017.   - Ultrasound of the kidneys   - Fractional excretion of sodium   - Urinalysis   - Avoid nephrotoxins   - IV fluids.   - Nephrology consultation.   - May need Dugan catheter if retaining urine.   - Bladder scan and catheterization as clinically indicated.    Hypotension, syncope: Previously with lightheadedness and syncope in January and resultant back injury.    - Echocardiogram.    - Telemetry.    - Orthostatics   - IVF.     Cardiac and coronary artery disease and hypertension and hyperlipidemia: Patient with history of triple bypass in 1995 per report, as well as hypertension, hyperlipidemia, peripheral vascular disease, coronary artery disease.   - Hold PTA Lisinopril   - PTA Amlodipine   - PTA Plavix   - PTA Lipitor   - Hold PTA HCTZ   - PTA ASA 81mg QD.    - PTA Atenolol   - Telemetry.     Diabetes mellitus: Patient maintained on multiple medications as an outpatient.   - Insulin sliding scale.   - Hold PTA Victoza   - Hold PTA Metformin   - Hold PTA Glipizide    Possible history of CLL: Patient with history of chronic lymphocytic leukemia in remission per report, however, further chart review necessary.   - Monitor.    History of BPH: Stable.  Maintained on Flomax as an outpatient.   - PTA Flomax    Back pain, history of fall in early January: Patient with x-rays from outside as well as CT of the abdomen without explicit fracture, see reports from care everywhere for further details.   - Pain control.   - PTA tramadol   - Hold PTA Methocarbamol    H/o Insomnia: Maintained on  zolpidem prior to admission.    - PTA Zolpidem    DVT Prophylaxis: Pneumatic Compression Devices    Code: Full Code    Disposition: Inpatient.    Dr. Kalia Barrientos D.O.  [facility] Hospitalist  Pager [phone]    Primary Care Physician  Gene Guevara    Chief Complaint  Renal failure    History is obtained from the patient and medical records.     History of Present Illness  Tad Manning is a 76 year old male who presents with renal failure from outside facility. Admitted for further evaluation and treatment.  Per report, patient with back pain and dizziness initially presenting to the emergency department and subsequently transferred for further evaluation.  Patient was seen previously in the clinic in December for lightheadedness and falls as well as trauma to his back, associated with ongoing back pain.  Lumbar x-rays as well as a CT of the abdomen was completed previously without evidence of fracture, see report for further details and care everywhere.  The patient continued to report decreased appetite, insomnia, intermittent lightheadedness, as well as back pain.  The patient denies chest pain, shortness of breath, abdominal pain, vomiting, blood in the urine or stool, rash, significant lower extremity edema, or headache.    Past Medical History   I have reviewed this patient's medical history and updated it with pertinent information if needed.   Past Medical History   Diagnosis Date     Coronary artery disease      triple bypass 1995     Diabetes mellitus (H)      History of blood transfusion      Hypertension      Malignant neoplasm (H)      CLL     Hyperlipidaemia      Sebaceous cyst      BMI 30.0-30.9,adult      BPH (benign prostatic hypertrophy)      Cellulitis      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)      Noninflammatory pericardial effusion      Osteomyelitis of left foot (H)      PVD (peripheral vascular disease) (H)      Diabetic  polyneuropathy (H)      ASCVD (arteriosclerotic cardiovascular disease)      Arthritis        Past Surgical History  I have reviewed this patient's surgical history and updated it with pertinent information if needed.  Past Surgical History   Procedure Laterality Date     Tonsillectomy       Bone marrow biopsy, bone specimen, needle/trocar  10/19/2012     Procedure: BIOPSY BONE MARROW;  BONE MARROW BIOPSY  (CONSCIOUS SED);  Surgeon: Ramon Quesada MD;  Location:  GI     Vascular surgery       ptcr legs     Bypass graft femoropopliteal  1/10/2014     Procedure: BYPASS GRAFT FEMOROPOPLITEAL;  Left above knee popliteal to  Below knee popliteal bypass using transverse saphenous vein graft. Left 2nd Toe amputation;  Surgeon: Amador Vick MD;  Location:  OR     Amputate toe(s)  1/10/2014     Procedure: AMPUTATE TOE(S);;  Surgeon: Amador Vick MD;  Location:  OR     Graft vein from extremity (location)  1/10/2014     Procedure: GRAFT VEIN FROM EXTREMITY (LOCATION);;  Surgeon: Amador Vick MD;  Location:  OR     Cardiac surgery       angioplasty with stent, triple bypass     Excise cyst generic (location) N/A 2/1/2016     Procedure: EXCISE CYST GENERIC (LOCATION);  Surgeon: Amador Vick MD;  Location:  [facility]       Prior to Admission Medications  Prior to Admission Medications   Prescriptions Last Dose Informant Patient Reported? Taking?   AMLODIPINE BESYLATE PO   Yes No   Sig: Take 5 mg by mouth daily    ASPIRIN PO   Yes No   Sig: Take 81 mg by mouth daily.   ATENOLOL PO   Yes No   Sig: Take 25 mg by mouth 2 times daily.   Ascorbic Acid (VITAMIN C PO)   Yes No   Sig: Take 500 mg by mouth daily.   Cholecalciferol (VITAMIN D PO)   Yes No   Sig: Take 1,000 Units by mouth daily.    GLIPIZIDE PO   Yes No   Sig: Take 5 mg by mouth 2 times daily (before meals).     IBUPROFEN PO   Yes No   LISINOPRIL PO   Yes No   Sig: Take 20 mg by mouth daily   MAGNESIUM PO   Yes No   METFORMIN HCL PO   Yes  No   Sig: Take 1,000 mg by mouth 2 times daily (with meals)    METHOCARBAMOL PO   Yes No   Nitroglycerin (NITROQUICK SL)   Yes No   Sig: Place 0.4 mg under the tongue as needed.   ZOLPIDEM TARTRATE PO   Yes No   Sig: Take 5 mg by mouth nightly as needed    atorvastatin (LIPITOR) 40 MG tablet   No No   Sig: Take 1 tablet by mouth daily.   clopidogrel (PLAVIX) 75 MG tablet   No No   Sig: Take 1 tablet (75 mg) by mouth daily   lidocaine (LIDODERM) 5 % Patch   Yes No   Sig: Place 1 patch onto the skin every 24 hours   liraglutide (VICTOZA) 18 MG/3ML soln   Yes No   Sig: Inject 1.8 mg Subcutaneous daily   lisinopril-hydrochlorothiazide (PRINZIDE,ZESTORETIC) 20-25 MG per tablet   Yes No   Sig: Take 1 tablet by mouth daily.   order for DME   No No   Sig: Equipment being ordered: DH2 shoe   tamsulosin (FLOMAX) 0.4 MG 24 hr capsule   Yes No   Sig: Take 0.4 mg by mouth daily      Facility-Administered Medications: None     Allergies  Allergies   Allergen Reactions     Blood Transfusion Related (Informational Only) Other (See Comments)     Patient has a history of a clinically significant antibody against RBC antigens.  A delay in compatible RBCs may occur.        Social History  I have reviewed this patient's social history and updated it with pertinent information if needed. Tad Manning  reports that he quit smoking about 21 years ago. His smoking use included Cigarettes. He has a 60 pack-year smoking history. He has never used smokeless tobacco. He reports that he drinks alcohol. He reports that he does not use illicit drugs.    Family History  I have reviewed this patient's family history and updated it with pertinent information if needed.   Family History   Problem Relation Age of Onset     CANCER Mother      leukemia       Review of Systems  The 10 point Review of Systems is negative other than noted in the HPI or here.     Physical Exam                     Vital Signs with Ranges     0 lbs 0 oz    GENERAL:  Alert and oriented. NAD. Conversational, appropriate.   HEENT: Normocephalic. EOMI. No icterus or injection. Nares normal.   LUNGS: Clear to auscultation. No dyspnea at rest.   HEART: Regular rate. Extremities perfused.   ABDOMEN: Soft, nontender, and nondistended. Positive bowel sounds.   EXTREMITIES: No LE edema noted.   NEUROLOGIC: Moves extremities x4 on command. No acute focal neurologic abnormalities noted. Mild tenderness to low back.     Data  Data reviewed today:  I personally reviewed both laboratory and imaging data.   No lab results found in last 7 days.    No results found for this or any previous visit (from the past 24 hour(s)).

## 2017-01-21 NOTE — PROGRESS NOTES
" 01/21/17 1449   Quick Adds   Type of Visit Initial PT Evaluation   Living Environment   Lives With other (see comments)  (lives with female roommate)   Living Arrangements apartment   Home Accessibility no concerns   Transportation Available car   Living Environment Comment Lives in 3rd floor apartment, has elevator access   Self-Care   Usual Activity Tolerance good   Current Activity Tolerance good   Equipment Currently Used at Home walker, rolling   Activity/Exercise/Self-Care Comment Just recenty started using FWW, was starting OPPT for back pain   Functional Level Prior   Ambulation 1-->assistive equipment   Transferring 1-->assistive equipment   Toileting 0-->independent   Bathing 0-->independent   Dressing 0-->independent   Eating 0-->independent   Communication 0-->understands/communicates without difficulty   Swallowing 0-->swallows foods/liquids without difficulty   Cognition 0 - no cognition issues reported   Fall history within last six months yes   Number of times patient has fallen within last six months 2   Which of the above functional risks had a recent onset or change? none   Prior Functional Level Comment mod I with FWW, IND with ADLs   General Information   Onset of Illness/Injury or Date of Surgery - Date 01/20/17   Referring Physician Kalia Barrientos, DO   Patient/Family Goals Statement to have less back pain   Pertinent History of Current Problem (include personal factors and/or comorbidities that impact the POC) Pt is a 76 year old male who presents with renal failure from outside facility. Admitted for further evaluation and treatment, PMH: ARF, hypotension, CAD s/p x3 bypass 1995, DM, \"possible h/o CLL\"   Precautions/Limitations fall precautions   General Info Comments Activity: Up with assist    Cognitive Status Examination   Orientation orientation to person, place and time   Level of Consciousness alert   Follows Commands and Answers Questions 100% of the time   Personal Safety and " "Judgment intact   Memory intact   Pain Assessment   Patient Currently in Pain (back pain 3/10)   Posture    Posture Forward head position;Protracted shoulders   Range of Motion (ROM)   ROM Comment WFL in [facility]   Strength   Strength Comments WFL in [facility]   Bed Mobility   Bed Mobility Comments IND with supine <> sit   Transfer Skills   Transfer Comments Supervision with sit <> stand   Gait   Gait Comments Supervision with FWW   Balance   Balance Comments good, no LOB/lateral path deviations noted   Sensory Examination   Sensory Perception Comments baseline neuropathy in [facility]   General Therapy Interventions   Planned Therapy Interventions gait training;transfer training;risk factor education;home program guidelines;progressive activity/exercise   Clinical Impression   Criteria for Skilled Therapeutic Intervention yes, treatment indicated   PT Diagnosis impaired gait   Influenced by the following impairments back pain   Functional limitations due to impairments ambulation distance   Clinical Presentation Stable/Uncomplicated   Clinical Presentation Rationale uncomplicated work up thus far   Clinical Decision Making (Complexity) Low complexity   Therapy Frequency` daily   Predicted Duration of Therapy Intervention (days/wks) Eval + 1 treat   Anticipated Discharge Disposition Home with Outpatient Therapy   Risk & Benefits of therapy have been explained Yes   Patient, Family & other staff in agreement with plan of care Yes   [facility] [employer] TM \"6 Clicks\"   2016, Trustees of [facility], under license to [employer].  All rights reserved.   6 Clicks Short Forms Basic Mobility Inpatient Short Form   [facility] [employer]  \"6 Clicks\" V.2 Basic Mobility Inpatient Short Form   1. Turning from your back to your side while in a flat bed without using bedrails? 4 - None   2. Moving from lying on your back to sitting on the side of a flat bed without using bedrails? 4 - None   3. Moving to and from a bed to a chair " (including a wheelchair)? 4 - None   4. Standing up from a chair using your arms (e.g., wheelchair, or bedside chair)? 4 - None   5. To walk in hospital room? 4 - None   6. Climbing 3-5 steps with a railing? 3 - A Little   Basic Mobility Raw Score (Score out of 24.Lower scores equate to lower levels of function) 23   Total Evaluation Time   Total Evaluation Time (Minutes) 13

## 2017-01-21 NOTE — PLAN OF CARE
Problem: Goal Outcome Summary  Goal: Goal Outcome Summary  Outcome: No Change  Patient A&Ox4. Denies pain. VSS. A1. Voiding per BR. Adequate output. IVF @ 75ml/hr. Renal diet. UC pending. Renal US and endocardiogram scheduled for today. Nephology consulted. Will continue to monitor.

## 2017-01-21 NOTE — CONSULTS
CLINICAL NUTRITION SERVICES  -  ASSESSMENT NOTE    Recommendations Ordered by Registered Dietitian (RD):   Weigh patient to better assess recent wt loss  Nepro @ 2 pm   Malnutrition:   Severe malnutrition  In Context of:  Acute illness or injury  Chronic illness or disease       REASON FOR ASSESSMENT  Tad Manning is a 76 year old male seen by Registered Dietitian for Admission Nutrition Risk Screen - unintentional weight loss of 10# or more in the past 2 months.     NUTRITION HISTORY  - Information obtained from patient. Tad reports reduced appetite x5 weeks PTA. Appetite has been 25-50% of normal.   - He endorses weight loss of 40# in 40 days, #, current wt 180#.   - Pt admitted from outside facility with renal failure.    CURRENT NUTRITION ORDERS  Diet Order:     Renal     Current Intake/Tolerance:  50-75% intakes, small meals. Poor appetite.    PHYSICAL FINDINGS  Observed  No nutrition-related physical findings observed  Obtained from Chart/Interdisciplinary Team  None noted    ANTHROPOMETRICS  Height: 6' - 12/29/16  Weight: 95.3 kg - 12/29/16  BMI: 28.47 kg/m^2 - calculated using weight/ht from 12/29/16  Weight Status:  Overweight BMI 25-29.9  IBW: 81 kg  % IBW: 118%  Weight History: Pt appears to have lost ~10# from October to December 2016. 4.5% in 2 months. Considering pts reported current weight of 180#, pt has lost 40# since 10/27/16, or 18% in 3 months.   Wt Readings from Last 10 Encounters:   12/29/16 95.255 kg (210 lb)   12/22/16 95.255 kg (210 lb)   10/27/16 99.791 kg (220 lb)   10/13/16 99.338 kg (219 lb)   09/22/16 99.338 kg (219 lb)   09/08/16 99.338 kg (219 lb)   08/23/16 99.338 kg (219 lb)   08/08/16 99.338 kg (219 lb)   07/21/16 99.338 kg (219 lb)   02/01/16 103.556 kg (228 lb 4.8 oz)       LABS  Labs reviewed  Hgb A1c 7.0 (H) - taken 1/21    MEDICATIONS  Medications reviewed  Home DM medications include Victoza, Metformin, Glipizide   Vit C  M SSI  NaCl IVF @ 75  ml/hr    Dosing Weight 95.3 kg - most recent wt available    ASSESSED NUTRITION NEEDS:  Estimated Energy Needs: 6533-3452 kcals (20-25 Kcal/Kg)  Justification: overweight  Estimated Protein Needs: 57-76 grams protein (0.6-0.8 g pro/Kg)  Justification: CKD    NUTRITION STATUS VALIDATION  % Intake:</= 50% for >/= 1 month (severe malnutrition)  % Weight Loss:> 10% in 6 months (severe malnutrition)  Subcutaneous Fat Loss:None observed  Muscle Loss:None observed  Fluid/Edema:None noted    Malnutrition Diagnosis: Severe malnutrition  In Context of:  Acute illness or injury  Chronic illness or disease    NUTRITION DIAGNOSIS:  Inadequate oral intake related to poor appetite as evidenced by reported wt loss 18% in 3 months.     NUTRITION INTERVENTIONS  Recommendations / Nutrition Prescription  Diet per MD  Oral nutrition supplements @ 2 pm (renal)    Implementation  Nutrition education: Provided education on supplements  Medical Food Supplement: Renal supplements @ 2pm    Nutrition Goals  Pt to consume 50% of meals + 1 supplement daily    MONITORING AND EVALUATION:  Progress towards goals will be monitored and evaluated per protocol and Practice Guidelines    Malina Lei RD, LD

## 2017-01-21 NOTE — PLAN OF CARE
Problem: Goal Outcome Summary  Goal: Goal Outcome Summary  Outcome: No Change  A&Ox4. VSS on room air. Up with assist x1. PT signing off. Encourage out of bed activity. Tolerating renal diet. , 112. SSI as ordered. Voiding in urinal at bedside. Adequate output. Renal US unremarkable- no hydronephrosis. ECHO results pending. Nephology consulted. Will continue to monitor.

## 2017-01-21 NOTE — PLAN OF CARE
Problem: Goal Outcome Summary  Goal: Goal Outcome Summary  PT: Orders received, eval and treatment completed.  Pt admitted from outside facility for further work up of renal failure.  Pt reports living in 3rd story apartment with a roommate, has elevator access.  Pt reports having just started using FWW x4 days prior to admission, otherwise IND with mobility and ADLs.  Pt works full time selling photography accessories.  Currently, pt is IND with bed mobility and transfers, supervision with ambulation with FWW x160' x2.    Encourage pt ambulate at least x4 walks/day with staff to increase OOB activity.    Physical Therapy Discharge Summary    Reason for therapy discharge:    All goals and outcomes met, no further needs identified.    Progress towards therapy goal(s). See goals on Care Plan in [facility] electronic health record for goal details.  Goals met    Therapy recommendation(s):    Continue ambulating frequently to maintain strength and increase OOB activity, resume OPPT for back pain upon discharge.

## 2017-01-21 NOTE — PLAN OF CARE
Problem: Goal Outcome Summary  Goal: Goal Outcome Summary  Outcome: No Change  Pt is alert and oriented X4. VSS.  Pt complained of back pain, tylenol given with some relief.  Blood sugars 115 and 143.  UC pending.  Scheduled for Renal US and endocardiogram tomorrow.  IVF running at 75ml/hr.  Previous ulcer present to right heel, covered with band-aid. Medical record authorization form needs to be signed by pt and faxed to [facility] for pt records. Assist of 1 with transfers.  Renal diet.  Will continue to monitor.

## 2017-01-22 LAB
ANION GAP SERPL CALCULATED.3IONS-SCNC: 9 MMOL/L (ref 3–14)
BASOPHILS # BLD AUTO: 0 10E9/L (ref 0–0.2)
BASOPHILS # BLD AUTO: 0 10E9/L (ref 0–0.2)
BASOPHILS NFR BLD AUTO: 0 %
BASOPHILS NFR BLD AUTO: 0.2 %
BUN SERPL-MCNC: 32 MG/DL (ref 7–30)
CALCIUM SERPL-MCNC: 8.4 MG/DL (ref 8.5–10.1)
CHLORIDE SERPL-SCNC: 105 MMOL/L (ref 94–109)
CO2 SERPL-SCNC: 23 MMOL/L (ref 20–32)
CREAT SERPL-MCNC: 1.31 MG/DL (ref 0.66–1.25)
DIFFERENTIAL METHOD BLD: ABNORMAL
DIFFERENTIAL METHOD BLD: ABNORMAL
EOSINOPHIL # BLD AUTO: 0.1 10E9/L (ref 0–0.7)
EOSINOPHIL # BLD AUTO: 0.1 10E9/L (ref 0–0.7)
EOSINOPHIL NFR BLD AUTO: 1.5 %
EOSINOPHIL NFR BLD AUTO: 1.7 %
ERYTHROCYTE [DISTWIDTH] IN BLOOD BY AUTOMATED COUNT: 14.9 % (ref 10–15)
ERYTHROCYTE [DISTWIDTH] IN BLOOD BY AUTOMATED COUNT: 15.1 % (ref 10–15)
GFR SERPL CREATININE-BSD FRML MDRD: 53 ML/MIN/1.7M2
GLUCOSE BLDC GLUCOMTR-MCNC: 131 MG/DL (ref 70–99)
GLUCOSE BLDC GLUCOMTR-MCNC: 170 MG/DL (ref 70–99)
GLUCOSE BLDC GLUCOMTR-MCNC: 195 MG/DL (ref 70–99)
GLUCOSE BLDC GLUCOMTR-MCNC: 200 MG/DL (ref 70–99)
GLUCOSE SERPL-MCNC: 144 MG/DL (ref 70–99)
HCT VFR BLD AUTO: 23.3 % (ref 40–53)
HCT VFR BLD AUTO: 24.6 % (ref 40–53)
HGB BLD-MCNC: 7.9 G/DL (ref 13.3–17.7)
HGB BLD-MCNC: 8.2 G/DL (ref 13.3–17.7)
IMM GRANULOCYTES # BLD: 0 10E9/L (ref 0–0.4)
IMM GRANULOCYTES # BLD: 0 10E9/L (ref 0–0.4)
IMM GRANULOCYTES NFR BLD: 0.2 %
IMM GRANULOCYTES NFR BLD: 0.2 %
LYMPHOCYTES # BLD AUTO: 0.8 10E9/L (ref 0.8–5.3)
LYMPHOCYTES # BLD AUTO: 1.1 10E9/L (ref 0.8–5.3)
LYMPHOCYTES NFR BLD AUTO: 15.9 %
LYMPHOCYTES NFR BLD AUTO: 17.4 %
MCH RBC QN AUTO: 29 PG (ref 26.5–33)
MCH RBC QN AUTO: 29.2 PG (ref 26.5–33)
MCHC RBC AUTO-ENTMCNC: 33.3 G/DL (ref 31.5–36.5)
MCHC RBC AUTO-ENTMCNC: 33.9 G/DL (ref 31.5–36.5)
MCV RBC AUTO: 86 FL (ref 78–100)
MCV RBC AUTO: 87 FL (ref 78–100)
MONOCYTES # BLD AUTO: 0.5 10E9/L (ref 0–1.3)
MONOCYTES # BLD AUTO: 0.5 10E9/L (ref 0–1.3)
MONOCYTES NFR BLD AUTO: 8.1 %
MONOCYTES NFR BLD AUTO: 9.7 %
NEUTROPHILS # BLD AUTO: 3.8 10E9/L (ref 1.6–8.3)
NEUTROPHILS # BLD AUTO: 4.4 10E9/L (ref 1.6–8.3)
NEUTROPHILS NFR BLD AUTO: 72.5 %
NEUTROPHILS NFR BLD AUTO: 72.6 %
NRBC # BLD AUTO: 0 10*3/UL
NRBC # BLD AUTO: 0 10*3/UL
NRBC BLD AUTO-RTO: 0 /100
NRBC BLD AUTO-RTO: 0 /100
PLATELET # BLD AUTO: 118 10E9/L (ref 150–450)
PLATELET # BLD AUTO: 131 10E9/L (ref 150–450)
POTASSIUM SERPL-SCNC: 4 MMOL/L (ref 3.4–5.3)
RBC # BLD AUTO: 2.71 10E12/L (ref 4.4–5.9)
RBC # BLD AUTO: 2.83 10E12/L (ref 4.4–5.9)
RETICS # AUTO: 37 10E9/L (ref 25–95)
RETICS/RBC NFR AUTO: 1.3 % (ref 0.5–2)
SODIUM SERPL-SCNC: 137 MMOL/L (ref 133–144)
WBC # BLD AUTO: 5.3 10E9/L (ref 4–11)
WBC # BLD AUTO: 6 10E9/L (ref 4–11)

## 2017-01-22 PROCEDURE — 25000132 ZZH RX MED GY IP 250 OP 250 PS 637: Mod: GY | Performed by: HOSPITALIST

## 2017-01-22 PROCEDURE — 84156 ASSAY OF PROTEIN URINE: CPT | Performed by: INTERNAL MEDICINE

## 2017-01-22 PROCEDURE — 86850 RBC ANTIBODY SCREEN: CPT | Performed by: HOSPITALIST

## 2017-01-22 PROCEDURE — 40000847 ZZHCL STATISTIC MORPHOLOGY W/INTERP HISTOLOGY TC 85060: Performed by: HOSPITALIST

## 2017-01-22 PROCEDURE — 86901 BLOOD TYPING SEROLOGIC RH(D): CPT | Performed by: HOSPITALIST

## 2017-01-22 PROCEDURE — 80048 BASIC METABOLIC PNL TOTAL CA: CPT | Performed by: HOSPITALIST

## 2017-01-22 PROCEDURE — A9270 NON-COVERED ITEM OR SERVICE: HCPCS | Mod: GY | Performed by: HOSPITALIST

## 2017-01-22 PROCEDURE — 12000000 ZZH R&B MED SURG/OB

## 2017-01-22 PROCEDURE — 99233 SBSQ HOSP IP/OBS HIGH 50: CPT | Performed by: HOSPITALIST

## 2017-01-22 PROCEDURE — 86922 COMPATIBILITY TEST ANTIGLOB: CPT | Performed by: HOSPITALIST

## 2017-01-22 PROCEDURE — 85060 BLOOD SMEAR INTERPRETATION: CPT | Performed by: HOSPITALIST

## 2017-01-22 PROCEDURE — 00000146 ZZHCL STATISTIC GLUCOSE BY METER IP

## 2017-01-22 PROCEDURE — 86900 BLOOD TYPING SEROLOGIC ABO: CPT | Performed by: HOSPITALIST

## 2017-01-22 PROCEDURE — 81001 URINALYSIS AUTO W/SCOPE: CPT | Performed by: INTERNAL MEDICINE

## 2017-01-22 PROCEDURE — 93005 ELECTROCARDIOGRAM TRACING: CPT

## 2017-01-22 PROCEDURE — 99222 1ST HOSP IP/OBS MODERATE 55: CPT | Mod: 25 | Performed by: INTERNAL MEDICINE

## 2017-01-22 PROCEDURE — 36415 COLL VENOUS BLD VENIPUNCTURE: CPT | Performed by: HOSPITALIST

## 2017-01-22 PROCEDURE — 93010 ELECTROCARDIOGRAM REPORT: CPT | Performed by: INTERNAL MEDICINE

## 2017-01-22 PROCEDURE — 85045 AUTOMATED RETICULOCYTE COUNT: CPT | Performed by: HOSPITALIST

## 2017-01-22 PROCEDURE — 85025 COMPLETE CBC W/AUTO DIFF WBC: CPT | Performed by: HOSPITALIST

## 2017-01-22 RX ADMIN — HYDROCODONE BITARTRATE AND ACETAMINOPHEN 2 TABLET: 5; 325 TABLET ORAL at 15:44

## 2017-01-22 RX ADMIN — CALCIUM CARBONATE (ANTACID) CHEW TAB 500 MG 1000 MG: 500 CHEW TAB at 22:29

## 2017-01-22 RX ADMIN — BACITRACIN ZINC AND POLYMYXIN B SULFATE: 500; 10000 OINTMENT TOPICAL at 09:14

## 2017-01-22 RX ADMIN — ATENOLOL 25 MG: 25 TABLET ORAL at 22:20

## 2017-01-22 RX ADMIN — HYDROCODONE BITARTRATE AND ACETAMINOPHEN 2 TABLET: 5; 325 TABLET ORAL at 22:20

## 2017-01-22 RX ADMIN — ASPIRIN 81 MG 81 MG: 81 TABLET ORAL at 22:20

## 2017-01-22 RX ADMIN — Medication 1 SPRAY: at 09:14

## 2017-01-22 RX ADMIN — CLOPIDOGREL BISULFATE 75 MG: 75 TABLET, FILM COATED ORAL at 09:13

## 2017-01-22 RX ADMIN — HYDROCODONE BITARTRATE AND ACETAMINOPHEN 1 TABLET: 5; 325 TABLET ORAL at 09:17

## 2017-01-22 RX ADMIN — VITAMIN D, TAB 1000IU (100/BT) 1000 UNITS: 25 TAB at 09:13

## 2017-01-22 RX ADMIN — BACITRACIN ZINC AND POLYMYXIN B SULFATE: 500; 10000 OINTMENT TOPICAL at 22:24

## 2017-01-22 RX ADMIN — TAMSULOSIN HYDROCHLORIDE 0.8 MG: 0.4 CAPSULE ORAL at 22:20

## 2017-01-22 RX ADMIN — INSULIN ASPART 1 UNITS: 100 INJECTION, SOLUTION INTRAVENOUS; SUBCUTANEOUS at 12:12

## 2017-01-22 RX ADMIN — INSULIN ASPART 2 UNITS: 100 INJECTION, SOLUTION INTRAVENOUS; SUBCUTANEOUS at 17:39

## 2017-01-22 RX ADMIN — AMLODIPINE BESYLATE 5 MG: 5 TABLET ORAL at 09:13

## 2017-01-22 RX ADMIN — ATENOLOL 25 MG: 25 TABLET ORAL at 09:13

## 2017-01-22 RX ADMIN — ATORVASTATIN CALCIUM 40 MG: 40 TABLET, FILM COATED ORAL at 22:20

## 2017-01-22 RX ADMIN — OXYCODONE HYDROCHLORIDE AND ACETAMINOPHEN 500 MG: 500 TABLET ORAL at 22:20

## 2017-01-22 ASSESSMENT — PAIN DESCRIPTION - DESCRIPTORS: DESCRIPTORS: ACHING

## 2017-01-22 NOTE — PLAN OF CARE
Problem: Goal Outcome Summary  Goal: Goal Outcome Summary  Outcome: No Change  A&Ox4. VSS. Denies pain. Up with A1. Voiding per urinal. Adequate output. Renal diet. IVF infusing @ 125ml/hr. Nephology following.  Will continue to monitor.

## 2017-01-22 NOTE — PROGRESS NOTES
[facility]    Hospitalist Progress Note    Date of Service (when I saw the patient): 01/22/2017    Assessment and Plan  Tad Manning is a 76 year old male who presents with renal failure from outside facility. Admitted for further evaluation and treatment.     Acute renal failure: Patient with a creatinine of 1.57 on 10 January 2007, increased to 4.2 on January 20, 2017.              - Ultrasound of the kidneys normal, no hydronephrosis              - Fractional excretion of sodium <1 indicating pre renal.              - Urinalysis shows albumin in urine, and hyaline casts.              - Avoid nephrotoxins              - IV fluids- decrease NS to 75 cc/hr   - CK level normal              - Appreciate nephro consultation   - Creatinine has trended down 4.2-->3.14-->2.43-->1.31, continue to monitor.    Hypotension, syncope: Previously with lightheadedness and syncope in January and resultant back injury.  most likely due to orthostatic hypotension. As per patient, his BP meds were decreased by his PCP likely due to hypotension and dizziness.              - Echocardiogram with decreased LV EF, no prior to compare              - Telemetry- no arrhythmias              - Orthostatic negative, still intermittently dizzy               - IVF as above.   - Therapy eval.   -BP meds adjusted as below, BP normal    Cardiac and coronary artery disease and hypertension and hyperlipidemia: Patient with history of triple bypass in 1995 per report, as well as hypertension, hyperlipidemia, peripheral vascular disease, coronary artery disease.              - continue to hold PTA Lisinopril and HCTZ              - Continue PTA Amlodipine 5 mg daily, Plavix 75 mg daily Lipitor 40 mg daily ASA 81mg QD and atenolol 25 mg BID              - Continue to monitor on telemetry.     - ECHO shows decreased LV function, EKG 1st degree block with left axis deviation. Had remote CABG, no prior ECHO result available, given  ongoing dizziness, no arrhythmias so far on tele, will request cardiology eval.    Diabetes mellitus: Patient maintained on multiple medications as an outpatient.              - Insulin sliding scale, BS in 100s mostly              - Hold PTA Victoza              - Hold PTA Metformin              - Hold PTA Glipizide    Possible history of CLL, now with anemia and throm: Patient with history of chronic lymphocytic leukemia in remission per report, now with anemia and thrombocytopenia               - No sign of bleed, but Hb continues to trend down   - ? Dilutional as well   - stool for guaiac, iron panel- AOCD   -peripheral smear   - recheck in afternoon, type and screen   - transfuse if <7   - if further drop in Hb or platelet, will consult hem onc      History of BPH: Stable.  Maintained on Flomax as an outpatient.              - PTA Flomax resumed    Back pain, history of fall in early January: Patient with x-rays from outside as well as CT of the abdomen without explicit fracture, see reports from care everywhere for further details.              - Pain control.              - PTA tramadol              - Hold PTA Methocarbamol    H/o Insomnia: Maintained on zolpidem prior to admission.                - PTA Zolpidem    DVT Prophylaxis: Pneumatic Compression Devices  Code Status: Full Code    Disposition: Expected discharge once Hb stable likely 1-2 days. Plan discussed with patient.    Patria Dee MD  hospitalist    Interval History  Patient was seen and examined, dizziness overall better, still occasionally, no arrhythmia in tele  - renal failure continues to improve  - Back pain controlled with medication.  -worsening anemia and thrombocytopenia      -Data reviewed today: I reviewed all new labs and imaging results over the last 24 hours. I personally reviewed the EKG tracing showing sinus, no tachy or bradyarrhythmias on tele. EKG- 1st degree HB, LAD and wide QRS    Renal US:      1. No  hydronephrosis.  2. Bilateral renal cysts.  3. Enlarged prostate versus mass indenting the bladder floor.      Physical Exam  Temp: 98.5  F (36.9  C) Temp src: Oral BP: 144/60 mmHg   Heart Rate: 79 Resp: 18 SpO2: 97 % O2 Device: None (Room air)    Filed Vitals:    01/22/17 0700   Weight: 86.1 kg (189 lb 13.1 oz)     Vital Signs with Ranges  Temp:  [97.1  F (36.2  C)-98.8  F (37.1  C)] 98.5  F (36.9  C)  Heart Rate:  [79-90] 79  Resp:  [18] 18  BP: (119-144)/(57-63) 144/60 mmHg  SpO2:  [95 %-100 %] 97 %  I/O last 3 completed shifts:  In: 2732 [P.O.:550; I.V.:2182]  Out: 2025 [Urine:2025]    Constitutional: Alert, awake. Comfortable  HEENT: PERRLA, EOMI  Respiratory: Bilateral equal air entry, clear to auscultation. No respiratory distress.  Cardiovascular: Regular s1s2, no murmur, rub or gallop. No tachycardia.  GI: Soft, non distended, non tender, bowel tones active.  Skin/Integumen: No rash, no blister. pale       Medications    NaCl 75 mL/hr at 01/22/17 0921       cholecalciferol  1,000 Units Oral Daily     bacitracin-polymyxin b   Topical BID     sodium chloride (PF)  3 mL Intracatheter Q8H     amLODIPine (NORVASC) tablet 5 mg  5 mg Oral Daily     ascorbic acid (VITAMIN C) tablet 500 mg  500 mg Oral At Bedtime     atenolol (TENORMIN) tablet 25 mg  25 mg Oral BID     atorvastatin  40 mg Oral At Bedtime     clopidogrel  75 mg Oral Daily     aspirin chewable tablet 81 mg  81 mg Oral At Bedtime     tamsulosin  0.8 mg Oral At Bedtime     insulin aspart  1-7 Units Subcutaneous TID AC     insulin aspart  1-5 Units Subcutaneous At Bedtime       Data    Recent Labs  Lab 01/22/17  0720 01/21/17  0740 01/20/17  2040   WBC 5.3 6.7 9.7   HGB 7.9* 8.9* 9.1*   MCV 86 87 87   * 151 158    137 133   POTASSIUM 4.0 4.8 4.6   CHLORIDE 105 103 101   CO2 23 23 24   BUN 32* 52* 60*   CR 1.31* 2.43* 3.14*   ANIONGAP 9 11 8   JUSTINO 8.4* 9.3 8.8   * 183* 113*   ALBUMIN  --  2.7* 2.8*   PROTTOTAL  --  6.6* 6.6*    BILITOTAL  --  0.7 0.6   ALKPHOS  --  101 100   ALT  --  23 26   AST  --  11 10       No results found for this or any previous visit (from the past 24 hour(s)).

## 2017-01-22 NOTE — PLAN OF CARE
Problem: Goal Outcome Summary  Goal: Goal Outcome Summary  Outcome: Improving  Pt is alert and oriented X4.  VSS.  Pt ambulated X2 this evening.  Blood sugars 152 and 179.  Pt currently on regular diet with fair appetite.  Assist of 1 with gait belt and walker.  New band-aid applied to right heel ulcer with bacitracin. IVF running at 125ml/hr.  Will continue to monitor.

## 2017-01-22 NOTE — CONSULTS
NEPHROLOGY CONSULTATION       DATE OF CONSULTATION:  01/21/2017.       REQUESTING PHYSICIAN:  Kalia Barrientos MD.       PRIMARY CARE PHYSICIAN:  Gene Guevara MD.       HISTORY OF PRESENT ILLNESS:  Tad Manning is a 76-year-old man whom we were asked to see to assist in evaluation of rapid onset acute renal failure.  The patient was admitted in transfer yesterday from the [facility] [place] [facility] in [place].  Mr. Manning has primary care through Dr. Guevara in the [place] system.  The [place] record contains some records from the [facility] system but none from the [place] system.  Mr. Manning presented to the emergency facility in [place] yesterday with complaints of back pain and lightheadedness.  He was found to be hypotensive at 80/52 at the beginning of the evaluation, otherwise he had normal vital signs without fever, respiratory distress, or any evident cardiac abnormalities.  He was thought to be volume depleted and was given a total of 2 liters of normal saline.  Blood pressure improved over the course of several hours to about 140/70.      LABORATORY DATA:  Showed a BUN and creatinine of 65 and 4.2.  Electrolytes were also abnormal.  Sodium and potassium were 134 and 4.9.  There was an anion gap acidosis with a CO2 of 19 and anion gap of 19.  Blood glucose was about 200.  Only 10 days ago, the patient had had a blood chemistry panel performed in the [facility] system; BUN and creatinine were then 55 and 1.57.  Electrolytes were normal with the exception of a sodium of 133; glucose was 200.  More distant indicators of renal function are somewhat contradictory.  There is lab data available in the [facility] system that include blood chemistries obtained in 01/2014.  At that time creatinine was about 1.6, electrolytes normal, and BUN 29.  In the [facility] system, however, blood chemistries from a similar time in 01/2014 show blood creatinine values between 0.9 and 1.02.  The patient was  hospitalized during that time at [facility] with peripheral vascular insufficiency and osteomyelitis of the left second toe.  He underwent a left above-knee popliteal to below-knee popliteal bypass using a saphenous vein graft and also had an amputation of the infected left second toe.      Mr. Manning was a direct admission here after his emergency room encounter at [facility].  Lab data have rapidly improved since yesterday.  Last night, BUN and creatinine were 60 and 3.14.  Today's values are 52 and 2.43.  Electrolytes have normalized, as has anion gap.  There are no other significant chemistry abnormalities.  CBC shows a moderate anemia, hemoglobin of about 9 with normal red cell indices.  White blood cell count and platelet count are normal, and differential is normal.  A urinalysis yesterday shows trace protein and otherwise normal dipstick.  There are numerous hyaline casts and a couple of white cells in an otherwise bland sediment.  Fractional excretion of sodium was calculated also on the urine last night and is 0.7% with a sodium of 57 and creatinine of 188.      Mr. Manning's past medical history is noteworthy for type 2 diabetes mellitus, hypertension, and hyperlipidemia.  In addition to his peripheral vascular insufficiency, he also has a diagnosis of coronary artery disease.  He underwent coronary bypass surgery in the distant past and has been free of angina symptoms recently.  He neither smokes nor consumes alcohol.      MEDICATIONS PRIOR TO ADMISSION:   1.  Lisinopril, hydrochlorothiazide, atenolol, and amlodipine for hypertension.     2.  Type 2 diabetes is managed with a combination of glipizide and metformin.     3.  Daily Aspirin.   4.  Vitamin D supplement.     5.  Clopidogrel daily.    6.  Tamsulosin for symptoms of prostatism.    7.  Atorvastatin for his lipid disorder.     8.  He tales a daily magnesium supplement.    9.  Usually takes ibuprofen 600 mg every morning for joint  pains.      His lisinopril, hydrochlorothiazide, oral hypoglycemics, and ibuprofen have been not reordered.  He has been continued on atenolol and amlodipine.  His aspirin, clopidogrel, vitamin D, atorvastatin, and tamsulosin have been continued.  He has been placed on prn insulin.  He has been on a normal saline infusion at 125 mL per hour.  His orthostatic symptoms have resolved.  He has made good volumes of urine, at least 100 mL per hour since admission.      Mr. Manning's history is also noteworthy for a diagnosis of chronic lymphocytic leukemia.  He was treated for this illness with acombination chemotherapy regimen of fludarabine, cyclophosphamide, and rituximab in 2012, and according to Dr. England's records he is in remission.  The [facility] record includes a visit with Dr. England in 07/2015.  At that time CBC showed a hemoglobin of 13.2, white blood cell count 5400 with 35% lymphocytes, and platelet count 126,000.      The [facility] system shows a hemoglobin on 01/10 of 13.2 and yesterday 12.7.  The drop in 24 hours is presumably related to volume repletion with IV fluid.  Red cell indices are normal on those 2 samples from the [facility] system.  White blood cell count on both was about 13,000.  The differential looks left shifted on the 10th.  Differential was not done on the 20th.  Platelets were greater than 200,000 on both of those samples.  Platelets are normal here as well.        Mr. Manning dates the onset of problems with lightheadedness to mid December.  At the same time he developed insidious back pain.  The cause of this is unclear, but it does not seem to have substantially improved.  CT scanning of the abdomen and pelvis were obtained on 01/10 and showed no significant abnormalities.  The kidneys and urinary tract look satisfactory.  There are bilateral renal cysts but no other abnormalities and no sign of obstructive uropathy.  The prostate was noted to be moderately enlarged.      PHYSICAL  EXAMINATION:   GENERAL:  Mr. Manning is an alert, calm, cheerful man in no distress.   VITAL SIGNS:  His temperature has been normal, pulse regular in the 80s, blood pressure is now 140/63; on admission it was about 120/60; respirations are normal, room air saturation is 98%.   SKIN:  Shows good color and turgor.  There are no skin lesions.  No lymphadenopathy is detected.   HEENT AND NECK:  Unremarkable.   LUNGS:  Seem clear.   HEART:  Normal, without murmur, rub or gallop.  Peripheral pulses are strong and symmetric.   ABDOMEN:  Negative without tenderness, masses or organomegaly.  No bruits are heard.   EXTREMITIES:  Warm.  There is a trace of edema in the left ankle, but none on the right; no other signs of fluid retention.   NEUROLOGIC:  Negative.      ASSESSMENT:  Mr. Manning's acute renal failure is very likely on a prerenal basis, given the speed with which it is improving on IV volume repletion.  It is difficult to know whether he has at baseline significant chronic kidney disease, although I expect that that is the case given the creatinine values of about 1.6 found in the [facility] system from 2014.  If those those are valid, there seems to have been little change from then until the blood chemistries of 11 days ago in the [facility] system, when creatinine was the same.  It seems likely that he has chronic kidney disease, stage III, due to a combination of diabetes, hypertension, and arteriosclerosis.  I will recheck the patient's UA and quantitate proteinuria.  This has not been done elsewhere.  I expect proteinuria to be in the non-nephrotic range in spite of the fact that he has a serun albumin of less than 3.  The cause of the patient's new lightheadedness from about a month ago remains unclear.  I wonder about the possibility of a neurologic event, not as yet evaluated.  Our group will continue to follow during the course of the hospital stay.  I would recommend continuing the current IV saline  infusion and checking chemistrieson a daily basis.  It is unclear whether additional diagnostic studies of the patient's chronic renal abnormality are necessary at this point.      Thank you for the opportunity to assist in Mr. Manning's care.         NITHYA RUIZ MD             D: 2017 18:11   T: 2017 19:34   MT: [id number]      Name:     PETER MANNING   MRN:      [id number]        Account:       [id number]   :      1940           Consult Date:  2017      Document: [id number]       cc: Gene Guevara MD

## 2017-01-23 LAB
ABO + RH BLD: NORMAL
ABO + RH BLD: NORMAL
ALBUMIN UR-MCNC: 30 MG/DL
ALBUMIN UR-MCNC: ABNORMAL MG/DL
ANION GAP SERPL CALCULATED.3IONS-SCNC: 9 MMOL/L (ref 3–14)
APPEARANCE UR: ABNORMAL
APPEARANCE UR: CLEAR
BACTERIA #/AREA URNS HPF: ABNORMAL /HPF
BASOPHILS # BLD AUTO: 0 10E9/L (ref 0–0.2)
BASOPHILS NFR BLD AUTO: 0 %
BILIRUB UR QL STRIP: ABNORMAL
BILIRUB UR QL STRIP: NEGATIVE
BLD GP AB SCN SERPL QL: NORMAL
BLD PROD TYP BPU: NORMAL
BLOOD BANK CMNT PATIENT-IMP: NORMAL
BUN SERPL-MCNC: 19 MG/DL (ref 7–30)
CALCIUM SERPL-MCNC: 8.5 MG/DL (ref 8.5–10.1)
CHLORIDE SERPL-SCNC: 103 MMOL/L (ref 94–109)
CO2 SERPL-SCNC: 23 MMOL/L (ref 20–32)
COLOR UR AUTO: ABNORMAL
COLOR UR AUTO: YELLOW
COPATH REPORT: NORMAL
CREAT SERPL-MCNC: 1.01 MG/DL (ref 0.66–1.25)
CREAT UR-MCNC: 130 MG/DL
CREAT UR-MCNC: NORMAL MG/DL
DIFFERENTIAL METHOD BLD: ABNORMAL
EOSINOPHIL # BLD AUTO: 0.1 10E9/L (ref 0–0.7)
EOSINOPHIL NFR BLD AUTO: 0.9 %
ERYTHROCYTE [DISTWIDTH] IN BLOOD BY AUTOMATED COUNT: 15 % (ref 10–15)
GFR SERPL CREATININE-BSD FRML MDRD: 72 ML/MIN/1.7M2
GLUCOSE BLDC GLUCOMTR-MCNC: 135 MG/DL (ref 70–99)
GLUCOSE BLDC GLUCOMTR-MCNC: 144 MG/DL (ref 70–99)
GLUCOSE BLDC GLUCOMTR-MCNC: 172 MG/DL (ref 70–99)
GLUCOSE BLDC GLUCOMTR-MCNC: 208 MG/DL (ref 70–99)
GLUCOSE SERPL-MCNC: 184 MG/DL (ref 70–99)
GLUCOSE UR STRIP-MCNC: ABNORMAL MG/DL
GLUCOSE UR STRIP-MCNC: NEGATIVE MG/DL
HCT VFR BLD AUTO: 24.2 % (ref 40–53)
HEMOCCULT STL QL: NEGATIVE
HGB BLD-MCNC: 8.2 G/DL (ref 13.3–17.7)
HGB UR QL STRIP: ABNORMAL
HGB UR QL STRIP: NEGATIVE
IMM GRANULOCYTES # BLD: 0 10E9/L (ref 0–0.4)
IMM GRANULOCYTES NFR BLD: 0.2 %
KETONES UR STRIP-MCNC: ABNORMAL MG/DL
KETONES UR STRIP-MCNC: NEGATIVE MG/DL
LEUKOCYTE ESTERASE UR QL STRIP: ABNORMAL
LEUKOCYTE ESTERASE UR QL STRIP: NEGATIVE
LYMPHOCYTES # BLD AUTO: 1.1 10E9/L (ref 0.8–5.3)
LYMPHOCYTES NFR BLD AUTO: 18.6 %
MCH RBC QN AUTO: 29.1 PG (ref 26.5–33)
MCHC RBC AUTO-ENTMCNC: 33.9 G/DL (ref 31.5–36.5)
MCV RBC AUTO: 86 FL (ref 78–100)
MONOCYTES # BLD AUTO: 0.4 10E9/L (ref 0–1.3)
MONOCYTES NFR BLD AUTO: 6.2 %
MUCOUS THREADS #/AREA URNS LPF: PRESENT /LPF
NEUTROPHILS # BLD AUTO: 4.3 10E9/L (ref 1.6–8.3)
NEUTROPHILS NFR BLD AUTO: 74.1 %
NITRATE UR QL: ABNORMAL
NITRATE UR QL: NEGATIVE
NRBC # BLD AUTO: 0 10*3/UL
NRBC BLD AUTO-RTO: 0 /100
NUM BPU REQUESTED: 2
PH UR STRIP: 5.5 PH (ref 5–7)
PH UR STRIP: ABNORMAL PH (ref 5–7)
PLATELET # BLD AUTO: 115 10E9/L (ref 150–450)
POTASSIUM SERPL-SCNC: 3.8 MMOL/L (ref 3.4–5.3)
PROT UR-MCNC: 0.88 G/L
PROT UR-MCNC: NORMAL G/L
PROT/CREAT 24H UR: 0.68 G/G CR (ref 0–0.2)
PROT/CREAT 24H UR: NORMAL G/G CR (ref 0–0.2)
RBC # BLD AUTO: 2.82 10E12/L (ref 4.4–5.9)
RBC #/AREA URNS AUTO: 1 /HPF (ref 0–2)
RBC #/AREA URNS AUTO: ABNORMAL /HPF (ref 0–2)
SODIUM SERPL-SCNC: 135 MMOL/L (ref 133–144)
SP GR UR STRIP: 1.02 (ref 1–1.03)
SP GR UR STRIP: ABNORMAL (ref 1–1.03)
SPECIMEN EXP DATE BLD: NORMAL
URN SPEC COLLECT METH UR: ABNORMAL
URN SPEC COLLECT METH UR: ABNORMAL
UROBILINOGEN UR STRIP-MCNC: ABNORMAL MG/DL (ref 0–2)
UROBILINOGEN UR STRIP-MCNC: NORMAL MG/DL (ref 0–2)
WBC # BLD AUTO: 5.9 10E9/L (ref 4–11)
WBC #/AREA URNS AUTO: 1 /HPF (ref 0–2)
WBC #/AREA URNS AUTO: ABNORMAL /HPF (ref 0–2)

## 2017-01-23 PROCEDURE — 82272 OCCULT BLD FECES 1-3 TESTS: CPT | Performed by: HOSPITALIST

## 2017-01-23 PROCEDURE — A9270 NON-COVERED ITEM OR SERVICE: HCPCS | Mod: GY | Performed by: HOSPITALIST

## 2017-01-23 PROCEDURE — 25000125 ZZHC RX 250: Performed by: HOSPITALIST

## 2017-01-23 PROCEDURE — 84156 ASSAY OF PROTEIN URINE: CPT | Performed by: HOSPITALIST

## 2017-01-23 PROCEDURE — 36415 COLL VENOUS BLD VENIPUNCTURE: CPT | Performed by: HOSPITALIST

## 2017-01-23 PROCEDURE — 99232 SBSQ HOSP IP/OBS MODERATE 35: CPT | Performed by: HOSPITALIST

## 2017-01-23 PROCEDURE — 25000132 ZZH RX MED GY IP 250 OP 250 PS 637: Mod: GY | Performed by: HOSPITALIST

## 2017-01-23 PROCEDURE — 12000000 ZZH R&B MED SURG/OB

## 2017-01-23 PROCEDURE — 85025 COMPLETE CBC W/AUTO DIFF WBC: CPT | Performed by: HOSPITALIST

## 2017-01-23 PROCEDURE — 80048 BASIC METABOLIC PNL TOTAL CA: CPT | Performed by: HOSPITALIST

## 2017-01-23 PROCEDURE — 81001 URINALYSIS AUTO W/SCOPE: CPT | Performed by: INTERNAL MEDICINE

## 2017-01-23 PROCEDURE — 00000146 ZZHCL STATISTIC GLUCOSE BY METER IP

## 2017-01-23 RX ORDER — BACITRACIN ZINC AND POLYMYXIN B SULFATE 500; 1000 [USP'U]/G; [USP'U]/G
OINTMENT TOPICAL 2 TIMES DAILY
Qty: 15 G | Refills: 0 | Status: ON HOLD | OUTPATIENT
Start: 2017-01-23 | End: 2017-03-18

## 2017-01-23 RX ORDER — TRAMADOL HYDROCHLORIDE 50 MG/1
50 TABLET ORAL EVERY 6 HOURS PRN
Qty: 28 TABLET | Refills: 0 | Status: SHIPPED | OUTPATIENT
Start: 2017-01-23 | End: 2017-01-25

## 2017-01-23 RX ORDER — AMOXICILLIN 250 MG
1-2 CAPSULE ORAL 2 TIMES DAILY PRN
Qty: 100 TABLET | Refills: 0 | Status: ON HOLD | OUTPATIENT
Start: 2017-01-23 | End: 2017-04-08

## 2017-01-23 RX ORDER — POLYETHYLENE GLYCOL 3350 17 G/17G
17 POWDER, FOR SOLUTION ORAL DAILY PRN
Qty: 7 PACKET | Refills: 0 | Status: ON HOLD | OUTPATIENT
Start: 2017-01-23 | End: 2017-04-08

## 2017-01-23 RX ORDER — LIRAGLUTIDE 6 MG/ML
1.2 INJECTION SUBCUTANEOUS DAILY
Status: DISCONTINUED | OUTPATIENT
Start: 2017-01-23 | End: 2017-01-25 | Stop reason: HOSPADM

## 2017-01-23 RX ADMIN — INSULIN ASPART 1 UNITS: 100 INJECTION, SOLUTION INTRAVENOUS; SUBCUTANEOUS at 18:11

## 2017-01-23 RX ADMIN — AMLODIPINE BESYLATE 5 MG: 5 TABLET ORAL at 07:33

## 2017-01-23 RX ADMIN — ATENOLOL 25 MG: 25 TABLET ORAL at 07:34

## 2017-01-23 RX ADMIN — CLOPIDOGREL BISULFATE 75 MG: 75 TABLET, FILM COATED ORAL at 07:33

## 2017-01-23 RX ADMIN — VITAMIN D, TAB 1000IU (100/BT) 1000 UNITS: 25 TAB at 07:33

## 2017-01-23 RX ADMIN — Medication 1 SPRAY: at 20:19

## 2017-01-23 RX ADMIN — INSULIN ASPART 2 UNITS: 100 INJECTION, SOLUTION INTRAVENOUS; SUBCUTANEOUS at 12:05

## 2017-01-23 RX ADMIN — Medication 2.5 MG: at 10:15

## 2017-01-23 RX ADMIN — ATORVASTATIN CALCIUM 40 MG: 40 TABLET, FILM COATED ORAL at 22:36

## 2017-01-23 RX ADMIN — LIRAGLUTIDE 1.2 MG: 6 INJECTION SUBCUTANEOUS at 16:03

## 2017-01-23 RX ADMIN — ASPIRIN 81 MG 81 MG: 81 TABLET ORAL at 22:36

## 2017-01-23 RX ADMIN — ATENOLOL 25 MG: 25 TABLET ORAL at 20:26

## 2017-01-23 RX ADMIN — OXYCODONE HYDROCHLORIDE AND ACETAMINOPHEN 500 MG: 500 TABLET ORAL at 22:36

## 2017-01-23 RX ADMIN — BACITRACIN ZINC AND POLYMYXIN B SULFATE: 500; 10000 OINTMENT TOPICAL at 20:27

## 2017-01-23 RX ADMIN — SENNOSIDES AND DOCUSATE SODIUM 2 TABLET: 8.6; 5 TABLET ORAL at 08:01

## 2017-01-23 RX ADMIN — INSULIN ASPART 1 UNITS: 100 INJECTION, SOLUTION INTRAVENOUS; SUBCUTANEOUS at 07:32

## 2017-01-23 RX ADMIN — ACETAMINOPHEN 650 MG: 325 TABLET, FILM COATED ORAL at 20:26

## 2017-01-23 RX ADMIN — BACITRACIN ZINC AND POLYMYXIN B SULFATE: 500; 10000 OINTMENT TOPICAL at 07:39

## 2017-01-23 RX ADMIN — TAMSULOSIN HYDROCHLORIDE 0.8 MG: 0.4 CAPSULE ORAL at 22:36

## 2017-01-23 RX ADMIN — Medication 2.5 MG: at 16:03

## 2017-01-23 RX ADMIN — BISACODYL 10 MG: 10 SUPPOSITORY RECTAL at 10:15

## 2017-01-23 NOTE — PLAN OF CARE
Problem: Goal Outcome Summary  Goal: Goal Outcome Summary  Outcome: Improving  VSS, afebrile, persistent pain in low back, taking 1-2 norco and using hot packs PRN. A/O, up w-1 and walker, needs encouragement, good PO, BG with SS coverage. Blister on (R)foor, bandaid applied. Calls for needs, hopes to DC home tomorrow, family will needs heads up (1 1/2 hr drive).

## 2017-01-23 NOTE — PROGRESS NOTES
I reviewed his chart. STORM resolved. I will sign-off. Please call us with any questions or concerns. Thanks.

## 2017-01-23 NOTE — CONSULTS
CARDIOVASCULAR CONSULTATION      TIME:  8:00 p.m.      REQUESTING PHYSICIAN:  Kalia Barrientos DO      CHIEF COMPLAINT:  Evaluation for coronary artery disease, depressed systolic ejection fraction and dizziness.      HISTORY OF PRESENT ILLNESS:  I had the pleasure evaluating of Mr. Tad Manning for his history of coronary artery disease, depressed systolic ejection fraction and dizziness.  Mr. Manning is a very pleasant 76-year-old gentleman with a past medical history notable for coronary artery disease, status post 3-vessel bypass in 1995 (LIMA to LAD, saphenous vein graft to first diagonal, saphenous vein graft to OM1), diabetes mellitus, CLL, hypertension, and peripheral vascular disease.  Mr. Manning has been having ongoing issues due to falls and lightheadedness.  He was recently evaluated at an outside facility by his primary care provider and was found to have an elevated serum creatinine.  In the Emergency Department, the patient was found to be hypotensive and lightheaded.  His reported blood pressure was 80/50 mmHg.  He was subsequently given 2 liters of normal saline and his blood pressure improved to 140/70 with resolution of his lightheadedness.  His initial labs were remarkable for a serum creatinine of 4.2.  He was subsequently transferred to [facility] and has been followed by our colleagues in Internal Medicine and Nephrology.  The patient has been treated with aggressive volume repletion and his creatinine has improved tremendously.      As part of his workup for lightheadedness, the patient underwent a transthoracic echocardiogram.  This study was notable for an ejection fraction of 40% to 45%.  Biplane EF was 43%.  There was global hypokinesis with moderate to severe inferolateral hypokinesis.  There was also a grade I diastolic dysfunction as well as trace tricuspid valve regurgitation.  RV systolic pressure was 16 mmHg.  We do have some outside records available in Care  Everywhere.  The patient was seen by an outside cardiologist in 2007.  Per the records, the patient's ejection fraction in 1995 prior to bypass was 49%.  During that visit, the patient had complained of chest tightness when walking up a hill and underwent a myocardial perfusion study.  On that study, his ejection fraction was 45%.      In regards to his coronary artery disease, the patient states that aside from the chest tightness that he had in 2007, he has had no further episodes of chest pain or chest tightness.  He has had no further interventions on his coronary arteries either.  He does not know what his ejection fraction is.  He does also have a history of peripheral arterial disease.  In 2014, the patient underwent a left above-knee popliteal to below-knee popliteal bypass using a saphenous vein graft and had an amputation of a left second toe with osteomyelitis.      In terms of the patient's lightheaded spells, the patient states all of his dizzy spells occur when he goes from a supine to standing or sitting to standing position.  He has never had dizziness or lightheadedness when sitting or supine.  The events have a little bit of a prodrome where he feels a bit wobbly or unsteady of gait and then at times he has fallen including a fall where he injured his back in 12/2016.  He has had no history of cardiac dysrhythmias and does not have nausea preceding his symptoms.      REVIEW OF SYSTEMS:  A 10-point review of systems was performed and was negative unless noted in HPI.      PAST MEDICAL HISTORY:   1.  Notable for coronary artery disease, status post coronary artery bypass grafting in 1995.   2.  Systolic heart failure at least since 1995, at that time with an ejection fraction of 49%.   3.  Chronic lymphocytic leukemia.   4.  Hypertension.   5.  Peripheral vascular disease.      PAST SURGICAL HISTORY:     1.  Notable for a left-sided femoral popliteal bypass in 01/2014 using a  saphenous vein graft.    2.  Coronary artery bypass grafting with LIMA to LAD, saphenous vein graft to OM1, saphenous vein graft to first diagonal.   3.  History of toe amputation.      PERTINENT HOME MEDICATIONS:   1.  Amlodipine 5 mg daily.   2.  Aspirin 81 mg daily.   3.  Atenolol 25 mg b.i.d.   4.  Lisinopril 20 mg daily.   5.  Aspirin 40 mg daily.   6.  Clopidogrel 75 mg daily.   7.  Hydrochlorothiazide 25 mg daily.      SOCIAL HISTORY:  The patient is a former smoker but quit 21 years ago.  Denies drug or alcohol abuse.      FAMILY HISTORY:  Mother had a liquid tumor.      PHYSICAL EXAMINATION:   VITAL SIGNS:  Temperature 97.9, pulse 83, respiratory rate 18, blood pressure 140/59, oxygen saturation 99% on room air.   GENERAL:  The patient is awake, alert, conversant.   HEENT:  No evidence of scleral icterus.   NECK:  Normal jugular venous pressure.   LUNGS:  Clear to auscultation bilaterally.   CARDIOVASCULAR:  Regular S1, S2, no appreciable murmur, rubs or gallops.   ABDOMEN:  Soft, nontender, nondistended, positive bowel sounds.   EXTREMITIES:  Warm and well perfused, difficult to palpate pedal pulses.  The right foot has a small wound on it that was covered with a Band-Aid.   NEUROLOGIC:  No focal neurologic deficits.   PSYCHIATRIC:  Normal mood and affect.      ASSESSMENT AND PLAN:   1.  Coronary artery disease.   2.  Systolic dysfunction.   3.  History of dizziness.   4.  Acute kidney injury due to pre-renal etiology.   5.  Diabetes mellitus.   6.  Peripheral arterial disease.      I had the pleasure of evaluating Mr. Tad Manning for his history of coronary artery disease, systolic dysfunction and dizziness.  In regards to systolic dysfunction, this does not appear to be a new finding.  I do not have any results of outside echoes, but reviewing his cardiology notes from 2007 in Care Everywhere it appears that his prebypass EF was 49% and was 45% in 2007 on myocardial perfusion imaging.  I do not believe that the patient's  mildly depressed systolic function is responsible for his falls or dizziness.  In regards to medical therapy, it appears that his lisinopril was held due to his acute kidney injury as well as lightheadedness.  I would recommend when able to resume his lisinopril at least at a low dose given his systolic dysfunction.  Also, the patient is on atenolol 25 mg b.i.d.; more optimal medication would be metoprolol.  But given his history of lightheadedness and dizziness, I will leave this up to the primary service to change if they wish.  The study was also notable for resting inferior regional wall motion abnormalities.  It would be best to obtain his prior outside echo to see if this is a new finding; but given his lack of symptoms, this has likely been present.  In terms of his history of coronary artery disease, the patient had a 12-lead ECG which is notable for sinus rhythm with first degree AV block, left axis deviation and LVH.      CURRENT MEDICATIONS:  He is on aspirin 81 mg as well as Clopidogrel 75 mg which was begun for his peripheral  arterial disease.  These can be continued.  He is currently asymptomatic from a cardiac standpoint.  Would also continue to monitor his current dose of statin.      The patient has had significant dizzy spells dating back for a year, but worse over the past 5 weeks.  It appears that on presentation, his lightheadedness was due to volume depletion and subsequent hypotension.  This corrected with fluid repletion and he has not had a dizzy spell since.  All of his dizzy spells occur with standing and are likely due to orthostasis.  In speaking to the patient, it appears his primary care provider has been trying to deescalate his antihypertensive regimen.  There also may be some degree of autonomic dysfunction.  If his symptoms persist, one could also consider some thigh-high stockings which may help in this particular situation.  Although the patient has a cardiac history, the  clinical symptoms are not suggestive of a cardiac etiology of syncope.      Thank you for allowing me to participate in Mr. Manning's care.  We will be signing off at this time.  Please do not hesitate to call with any concerns or questions.         GUILLAUME CISNEROS MD             D: 2017 22:58   T: 2017 23:35   MT: [id number]      Name:     PETER MANNING   MRN:      [id number]        Account:       [id number]   :      1940           Consult Date:  2017      Document: [id number]       cc: Kalia Barrientos DO

## 2017-01-23 NOTE — PROGRESS NOTES
[facility]    Hospitalist Progress Note    Date of Service (when I saw the patient): 01/23/2017    Assessment and Plan  Tad Manning is a 76 year old male who presents with renal failure from outside facility. Admitted for further evaluation and treatment.     Acute renal failure: Patient with a creatinine of 1.57 on 10 January 2007, increased to 4.2 on January 20, 2017.              - Ultrasound of the kidneys normal, no hydronephrosis              - Fractional excretion of sodium <1 indicating pre renal.              - Urinalysis shows albumin in urine, and hyaline casts.              - Avoid nephrotoxins           - CK level normal   - aggressively hydrated IV now off IVF              - Appreciate nephro consultation   -Creatinine has trended down 4.2-->3.14-->2.43-->1.31-->1.01      Hypotension, syncope: Previously with lightheadedness and syncope in January and resultant back injury.  most likely due to orthostatic hypotension. As per patient, his BP meds were decreased by his PCP likely due to hypotension and dizziness.              - Echocardiogram with decreased LV EF, no prior to compare              - Telemetry- no arrhythmias              - Orthostatic negative- recheck now that he is IV fluid >12 hours, still intermittently dizzy    - Therapy eval noted.   - BP meds adjusted as below, BP normal   - CT head as still intermittently dizzy    Cardiac and coronary artery disease and hypertension and hyperlipidemia: Patient with history of triple bypass in 1995 per report, as well as hypertension, hyperlipidemia, peripheral vascular disease, coronary artery disease.              - continue to hold PTA Lisinopril and HCTZ              - Continue PTA Amlodipine 5 mg daily, Plavix 75 mg daily Lipitor 40 mg daily ASA 81mg QD and atenolol 25 mg BID              - ECHO shows decreased LV function, EKG 1st degree block with left axis deviation. Had remote CABG, no prior ECHO result available,  given ongoing dizziness, no arrhythmias so far on tele, evaluated by cardiology, no further intervention, but to consider low dose ACEI - if renal function stable, defer as outpatient.    Diabetes mellitus: Patient maintained on multiple medications as an outpatient.              - Insulin sliding scale, BS high 100-low 200              - resume PTA Victoza and lower dose glipizide              - Hold PTA Metformin, likely will not resume at dc given his renal failure.          Possible history of CLL, now with anemia and throm: Patient with history of chronic lymphocytic leukemia in remission per report, now with anemia and thrombocytopenia               - No sign of bleed, but Hb trended down, ? dilutional as well.   - stool for guaiac negative, iron panel- AOCD   - peripheral smear pending, LFTs/ bili normal.   - transfuse if <7   - appreciate hemonc recommendation. LDH pending.     History of BPH: Stable.  Maintained on Flomax as an outpatient.              - PTA Flomax resumed    Back pain, history of fall in early January: Patient with x-rays from outside as well as CT of the abdomen without explicit fracture, reports from care everywhere noted.               - Pain control.              - PTA tramadol   - heat pack    H/o Insomnia: Maintained on zolpidem prior to admission.                - PTA Zolpidem    DVT Prophylaxis: Pneumatic Compression Devices  Code Status: Full Code    Disposition: Expected discharge likely in am if Hb/plt stable. Plan discussed with patient and his brother Seferino over the phone.    Patria Dee MD  hospitalist    Interval History     Patient was seen and examined, overall much better, back pain (+) but does not want medication, occ dizziness- much improved.   - anemia and thrombocytopenia stable.    -Data reviewed today: I reviewed all new labs and imaging results over the last 24 hours. I personally reviewed the EKG tracing showing sinus, no tachy or bradyarrhythmias on tele.  EKG- 1st degree HB, LAD and wide QRS  - records from outside hospital reviewed, no CT head was done.         Physical Exam  Temp: 99.4  F (37.4  C) Temp src: Oral BP: 127/61 mmHg   Heart Rate: 78 Resp: 18 SpO2: 96 % O2 Device: None (Room air)    Filed Vitals:    01/22/17 0700 01/23/17 0544   Weight: 86.1 kg (189 lb 13.1 oz) 85.6 kg (188 lb 11.4 oz)     Vital Signs with Ranges  Temp:  [97.9  F (36.6  C)-99.4  F (37.4  C)] 99.4  F (37.4  C)  Heart Rate:  [75-83] 78  Resp:  [18] 18  BP: (127-140)/(57-63) 127/61 mmHg  SpO2:  [96 %-99 %] 96 %  I/O last 3 completed shifts:  In: 2207 [P.O.:480; I.V.:1727]  Out: 1560 [Urine:1560]    Constitutional: Alert, awake. Comfortable.  HEENT: PERRLA, EOMI  Respiratory: Bilateral equal air entry, clear to auscultation. No respiratory distress.  Cardiovascular: Regular s1s2, no murmur, rub or gallop. No tachycardia.  GI: Soft, non distended, non tender, bowel tones active.  Skin/Integumen: No rash, no blister. Pale.  MSK: mild lower thoracic spine tenderness but no deformity, swelling.      Medications       glipiZIDE  2.5 mg Oral BID AC     liraglutide  1.2 mg Subcutaneous Daily     cholecalciferol  1,000 Units Oral Daily     bacitracin-polymyxin b   Topical BID     sodium chloride (PF)  3 mL Intracatheter Q8H     amLODIPine (NORVASC) tablet 5 mg  5 mg Oral Daily     ascorbic acid (VITAMIN C) tablet 500 mg  500 mg Oral At Bedtime     atenolol (TENORMIN) tablet 25 mg  25 mg Oral BID     atorvastatin  40 mg Oral At Bedtime     clopidogrel  75 mg Oral Daily     aspirin chewable tablet 81 mg  81 mg Oral At Bedtime     tamsulosin  0.8 mg Oral At Bedtime     insulin aspart  1-7 Units Subcutaneous TID AC     insulin aspart  1-5 Units Subcutaneous At Bedtime       Data    Recent Labs  Lab 01/23/17  0659 01/22/17  1456 01/22/17  0720 01/21/17  0740 01/20/17  2040   WBC 5.9 6.0 5.3 6.7 9.7   HGB 8.2* 8.2* 7.9* 8.9* 9.1*   MCV 86 87 86 87 87   * 118* 131* 151 158     --  137 137 133    POTASSIUM 3.8  --  4.0 4.8 4.6   CHLORIDE 103  --  105 103 101   CO2 23  --  23 23 24   BUN 19  --  32* 52* 60*   CR 1.01  --  1.31* 2.43* 3.14*   ANIONGAP 9  --  9 11 8   JUSTINO 8.5  --  8.4* 9.3 8.8   *  --  144* 183* 113*   ALBUMIN  --   --   --  2.7* 2.8*   PROTTOTAL  --   --   --  6.6* 6.6*   BILITOTAL  --   --   --  0.7 0.6   ALKPHOS  --   --   --  101 100   ALT  --   --   --  23 26   AST  --   --   --  11 10       No results found for this or any previous visit (from the past 24 hour(s)).

## 2017-01-23 NOTE — PROGRESS NOTES
Nephrology Progress Note          Assessment and Plan:       Acute renal failure, non-oliguric, rapidly better.  Likely all pre-renal.  Agree with slowing IV rate; probably d/c IV in AM.  Diet liberalized; just diabetic.  Agree with Cardiology consult and possible need for review by Dr. England for current anemia and low platelets.  Recheck urine, as planned.  Await AM chemistries.      * No resolved hospital problems. *               Interval History:   no new complaints, up and ambulating, alert, oriented to person, place and time and doing well; no cp, sob, n/v/d, or abd pain.  Feels better.  VS fine.  BP nl.  Good rm air SaO2.  Good appetite, but frustrated with diet restrictions.  Generous UO, >2L/day.  Meds and labs reviewed.  Lytes again nl.  Cr down to 1.31.  Glucoses ok, ~150-200.  Hgb lower, ~8, and plts falling, now 118K.  Peripheral morphology pending.  Echo reviewed; note LV dysfundtion.              Medications:       cholecalciferol  1,000 Units Oral Daily     bacitracin-polymyxin b   Topical BID     sodium chloride (PF)  3 mL Intracatheter Q8H     amLODIPine (NORVASC) tablet 5 mg  5 mg Oral Daily     ascorbic acid (VITAMIN C) tablet 500 mg  500 mg Oral At Bedtime     atenolol (TENORMIN) tablet 25 mg  25 mg Oral BID     atorvastatin  40 mg Oral At Bedtime     clopidogrel  75 mg Oral Daily     aspirin chewable tablet 81 mg  81 mg Oral At Bedtime     tamsulosin  0.8 mg Oral At Bedtime     insulin aspart  1-7 Units Subcutaneous TID AC     insulin aspart  1-5 Units Subcutaneous At Bedtime                       Physical Exam:       Vital Sign Ranges  Temp:  [98  F (36.7  C)-98.8  F (37.1  C)] 98.1  F (36.7  C)  Heart Rate:  [75-90] 83  Resp:  [18] 18  BP: (119-144)/(57-63) 128/57 mmHg  SpO2:  [95 %-100 %] 97 %    Weight, current:  86.1 kg (actual weight)  Weight change:     I/O last 3 completed shifts:  In: 1812 [P.O.:730; I.V.:1082]  Out: 1810 [Urine:1810]    Physical Exam:   General:  Patient comfortable,  in no apparent distress.  Awake, alert, oriented x3.  Neck:  Supple, no JVD.  Lungs:  Clear to auscultation bilaterally.  Cardiac:  Regular rate and rhythm, no murmurs, rub, or gallops.  Abdomen:  Soft, nontender, physiologic sounds.  Extremities:  Without edema.  2+ pulses.  Skin:  Warm, dry.  Neurologic:  No focal deficits.             Data:        Lab Results   Component Value Date     01/22/2017    Lab Results   Component Value Date    CHLORIDE 105 01/22/2017    Lab Results   Component Value Date    BUN 32* 01/22/2017      Lab Results   Component Value Date    POTASSIUM 4.0 01/22/2017    Lab Results   Component Value Date    CO2 23 01/22/2017    Lab Results   Component Value Date    CR 1.31* 01/22/2017        Lab Results   Component Value Date     01/22/2017     01/21/2017     01/20/2017     Lab Results   Component Value Date    POTASSIUM 4.0 01/22/2017    POTASSIUM 4.8 01/21/2017    POTASSIUM 4.6 01/20/2017     Lab Results   Component Value Date    CHLORIDE 105 01/22/2017    CHLORIDE 103 01/21/2017    CHLORIDE 101 01/20/2017     Lab Results   Component Value Date    CO2 23 01/22/2017    CO2 23 01/21/2017    CO2 24 01/20/2017     Lab Results   Component Value Date    CR 1.31* 01/22/2017    CR 2.43* 01/21/2017    CR 3.14* 01/20/2017     Lab Results   Component Value Date    BUN 32* 01/22/2017    BUN 52* 01/21/2017    BUN 60* 01/20/2017     Lab Results   Component Value Date    HGB 8.2* 01/22/2017    HGB 7.9* 01/22/2017    HGB 8.9* 01/21/2017     No results found for: PH, PHARTERIAL, PO2, [id number], SAT, PCO2, HCO3, BASEEXCESS, PAM, BEB          Luis Felipe Quiroz MD  Nephrology; [employer]  [phone]

## 2017-01-23 NOTE — PLAN OF CARE
Problem: Goal Outcome Summary  Goal: Goal Outcome Summary  Outcome: Improving  A&Ox4. VSS. C/o back pain declined medication intervention. Hot packs applied. Up with A1 and walker. Voiding per urinal. Adequate output. Diabetic diet. R PIV SL Nephology following.  Needs stool sample. Will continue to monitor.

## 2017-01-23 NOTE — PLAN OF CARE
Problem: Goal Outcome Summary  Goal: Goal Outcome Summary  Outcome: No Change  Tmax 99.4, other VSS. A&O, calls appropriately. Up with A1 and a walker. Ambulated in hallway x 2. Up in chair for meals. Needs encouragement for activity. C/o consitpation, gave senna x 1 and bisacodyl suppository x 1 with relief, 2 formed BMs this shift. C/o back pain, uses heat packs with moderate relief, refuses Norco. Continue to monitor.

## 2017-01-23 NOTE — DISCHARGE SUMMARY
[facility]    Discharge Summary  Hospitalist    Date of Admission:  1/20/2017  Date of Discharge:  1/25/2017  2:26 PM  Discharging Provider: Patria Dee MD  Date of Service (when I saw the patient): 01/25/2017    Discharge Diagnoses       Acute renal failure    Syncope likely due to orthostatic hypotension    Diabetes mellitus type 2    H/o CLL, now with anemia and thrombocytopenia    Severe protein calorie malnutrition     History of BPH    Back pain    Insomnia    History of Present Illness     Tad Manning is a 76 year old male who presented to outside facility with dizziness and was found to be on renal failure and was transferred to [facility] and admitted for further evaluation and treatment.     Hospital Course  Tad Manning was admitted on 1/20/2017.  The following problems were addressed during his hospitalization:    Acute renal failure: Patient with a creatinine of 1.57 on 10 January 2007, increased to 4.2 on January 20, 2017.              - Ultrasound of the kidneys normal, no hydronephrosis              - Fractional excretion of sodium <1 indicating pre renal.              - Urinalysis shows albumin in urine, and hyaline casts.              - CK level was level normal   - PTA including Lisinopril/HCTZ was discontinued, did not need for BP. He was also taking Ibuprofen, discontinued, and advised to not take NSAIDs.              - aggressively hydrated with IV fluid, creatinine has trended down 4.2-->3.14-->2.43-->1.31-->1.01              - Appreciate nephro consultation                          Hypotension, syncope: Previously with lightheadedness and syncope in January and resultant back injury. Given worsened Creatinine and orthostatic hypotension, most likely cause is orthostatic hypotension. As per patient, his BP meds were decreased by his PCP likely due to ongoing hypotension and dizziness.              - Echocardiogram with decreased LV EF, no prior to  compare              - Telemetry- no arrhythmias              - Orthostatic hypotension resolved with IV hydration, but was  intermittently dizzy and was positive again after IVF was stopped. At that point he was placed on midodrine, thigh high compression stockings used and subsequently it improved. He is aware of how to minimize dizziness including using wedge bed (sleeps propped up anyway due to back pain, gradually getting up and to wait couple of minutes before standing and walking) etc.              - Therapy eval noted, did good on ambulation.              - BP meds adjusted ACEI/HCTZ stopped, atenolol decreased to daily.               - CT head was done, negative for acute process.     Cardiac and coronary artery disease and hypertension and hyperlipidemia and peripheral vascular disease: Patient with history of triple bypass in 1995 per report, as well as hypertension, hyperlipidemia, peripheral vascular disease, coronary artery disease.              - PTA Lisinopril and HCTZ held and discontinued               - Continued on PTA Amlodipine 5 mg daily, Plavix 75 mg daily Lipitor 40 mg daily ASA 81mg QD. Atenolol 25 mg BID was decreased to daily.              - ECHO showed decreased LV function, EKG 1st degree block with left axis deviation. Had remote CABG, no prior ECHO result available, given ongoing dizziness, no arrhythmias on tele, cardiology service consulted, and evaluated, no further intervention at this time but recommend to consider changing atenolol to metoprolol and to resume ACEI given decreased LV function if renal function stable- needs to follow as outpatient.    Diabetes mellitus: Patient maintained on multiple medications as an outpatient.              - Insulin sliding scale was used and BS was high 100-low 200              - PTA Victoza and lower dose glipizide 2.5 mg BID was resumed. BS was in low 100 then so discharged on this regimen.              - PTA Metformin was held and not  resume at dc given his presentation with significant renal failure though it improved. Also he likely won't need it.          H/o CLL, now with anemia and thrombocytopenia: Patient with history of chronic lymphocytic leukemia in remission per report, now with anemia and thrombocytopenia. Followed by Dr England as outpatient.              - No sign of bleed, but Hb trended down, ? dilutional as well.              - stool for guaiac negative, iron panel- AOCD, retic count LDH B12 and bilirubin are normal                - peripheral smear shows Marked normocytic normochromic anemia                - transfuse if <7              - appreciate hemonc recommendation. No further work up/intervention needed.    Severe protein calorie malnutrition:  in the context of acute illness or injury, chronic illness or disease  -Patient had significant weight loss ( > 10% in 6 months)  and decreased appetite.  -dietician consulted.  -he was on protein supplement.   -Likely related to underlying malignancy.                History of BPH: Stable.  Maintained on Flomax as an outpatient.              - PTA Flomax resumed. He takes high dose flomax. If dizziness/orthostatic hypotension persists, may need to consider decreasing dose.    Back pain, history of fall in early January: Patient with x-rays from outside as well as CT of the abdomen without explicit fracture, reports from care everywhere noted. PTA tramadol resumed with PRN norco but he did not want to take medication. Pain well controlled. Use heat pack as well. Evaluated by PT, did well.    H/o Insomnia: Maintained on zolpidem prior to admission.                - PTA Zolpidem continued. Given dizziness, advised to avoid benadryl that he was taking.       Patria Dee MD    Significant Results and Procedures  US kidneys: no hydronephrosis.  CT head without acute process    Pending Results  These results will be followed up by none     Unresulted Labs Ordered in the Past 30 Days  of this Admission     No orders found from 11/22/2016 to 1/21/2017.          Code Status  Full Code       Primary Care Physician  Gene Guevara    Constitutional: Alert, awake. Comfortable.  HEENT: PERRLA, EOMI  Respiratory: Bilateral equal air entry, clear to auscultation. No respiratory distress.  Cardiovascular: Regular s1s2, no murmur, rub or gallop. No tachycardia.  GI: Soft, non distended, non tender, bowel tones active.  Skin/Integumen: No rash, no blister. Pale.  MSK: mild lower thoracic spine tenderness but no deformity, swelling.    Discharge Disposition  Discharged to home  Condition at discharge: Stable    Consultations This Hospital Stay  PHYSICAL THERAPY ADULT IP CONSULT  NEPHROLOGY IP CONSULT  CARDIOLOGY IP CONSULT  HEMATOLOGY & ONCOLOGY IP CONSULT    Time Spent on This Encounter  I, Patria Dee, personally saw the patient today and spent greater than 30 minutes discharging this patient.    Discharge Orders    Reason for your hospital stay   Acute renal failure, dizziness.     Activity   Your activity upon discharge: activity as tolerated     When to contact your care team   Call your primary doctor if you have any of the following: persistent dizziness.     Follow-up and recommended labs and tests    Follow up with primary care provider, Gene Guevara, within 7-10 days to evaluate medication change and for hospital follow- up.  The following labs/tests are recommended: CBC, BMP.    Follow up with Dr England in 2-4 weeks as recommended     Full Code     Diet   Follow this diet upon discharge: Orders Placed This Encounter  Snacks/Supplements Adult: Nepro Oral Supplement; Between Meals  Moderate Consistent Carbohydrate Diet       Discharge Medications  Discharge Medication List as of 1/25/2017 11:37 AM      START taking these medications    Details   midodrine (PROAMATINE) 2.5 MG tablet Take 1 tablet (2.5 mg) by mouth 2 times daily, Disp-60 tablet, R-0, E-Prescribe      polyethylene glycol  (MIRALAX/GLYCOLAX) Packet Take 17 g by mouth daily as needed for constipation, Disp-7 packet, R-0, E-Prescribe      senna-docusate (SENOKOT-S;PERICOLACE) 8.6-50 MG per tablet Take 1-2 tablets by mouth 2 times daily as needed (constipation ), Disp-100 tablet, R-0, E-Prescribe      bacitracin-polymyxin b (POLYSPORIN) ointment Apply topically 2 times dailyDisp-15 g, [id number]         CONTINUE these medications which have CHANGED    Details   glipiZIDE (GLUCOTROL) 5 MG tablet Take 0.5 tablets (2.5 mg) by mouth 2 times daily (before meals), Disp-30 tablet, R-0, E-Prescribe      atenolol (TENORMIN) 25 MG tablet Take 1 tablet (25 mg) by mouth daily, Disp-30 tablet, R-0, E-Prescribe      traMADol (ULTRAM) 50 MG tablet Take 1 tablet (50 mg) by mouth every 6 hours as needed, Disp-40 tablet, R-0, Local Print         CONTINUE these medications which have NOT CHANGED    Details   MAGNESIUM PO Take 250 mg by mouth daily , Historical      order for DME Equipment being ordered: DH2 shoeDisp-1 Device, R-0, Local Print      tamsulosin (FLOMAX) 0.4 MG 24 hr capsule Take 0.8 mg by mouth At Bedtime , Historical      liraglutide (VICTOZA) 18 MG/3ML soln Inject 1.2 mg Subcutaneous daily , Historical      AMLODIPINE BESYLATE PO Take 5 mg by mouth daily , Historical      ZOLPIDEM TARTRATE PO Take 5 mg by mouth nightly as needed , Historical      clopidogrel (PLAVIX) 75 MG tablet Take 1 tablet (75 mg) by mouth daily, Disp-90 tablet, R-3, Transitional      Cholecalciferol (VITAMIN D PO) Take 1,000 Units by mouth daily. , Historical      atorvastatin (LIPITOR) 40 MG tablet Take 1 tablet by mouth daily., Disp-30 tablet, R-1, E-Prescribe      Ascorbic Acid (VITAMIN C PO) Take 500 mg by mouth daily., Historical      ASPIRIN PO Take 81 mg by mouth daily., Historical      Nitroglycerin (NITROQUICK SL) Place 0.4 mg under the tongue as needed., Historical         STOP taking these medications       diphenhydrAMINE-acetaminophen (TYLENOL PM)   MG tablet Comments:   Reason for Stopping:         IBUPROFEN PO Comments:   Reason for Stopping:         LISINOPRIL PO Comments:   Reason for Stopping:         METFORMIN HCL PO Comments:   Reason for Stopping:         lisinopril-hydrochlorothiazide (PRINZIDE,ZESTORETIC) 20-25 MG per tablet Comments:   Reason for Stopping:             Allergies  Allergies   Allergen Reactions     Blood Transfusion Related (Informational Only) Other (See Comments)     Patient has a history of a clinically significant antibody against RBC antigens.  A delay in compatible RBCs may occur.      Data  Most Recent 3 CBC's:  Recent Labs   Lab Test  01/24/17   0719  01/23/17   0659  01/22/17   1456   WBC  6.8  5.9  6.0   HGB  8.6*  8.2*  8.2*   MCV  85  86  87   PLT  131*  115*  118*      Most Recent 3 BMP's:  Recent Labs   Lab Test  01/23/17   0659  01/22/17   0720  01/21/17   0740   NA  135  137  137   POTASSIUM  3.8  4.0  4.8   CHLORIDE  103  105  103   CO2  23  23  23   BUN  19  32*  52*   CR  1.01  1.31*  2.43*   ANIONGAP  9  9  11   JUSTINO  8.5  8.4*  9.3   GLC  184*  144*  183*     Most Recent 2 LFT's:  Recent Labs   Lab Test  01/21/17   0740  01/20/17   2040   AST  11  10   ALT  23  26   ALKPHOS  101  100   BILITOTAL  0.7  0.6     Most Recent INR's and Anticoagulation Dosing History:  Anticoagulation Dose History     Recent Dosing and Labs Latest Ref Rng 3/11/2013 4/15/2013 1/9/2014 1/10/2014 5/23/2014    INR 0.86 - 1.14 0.92 0.89 0.95 0.95 0.91        Most Recent 3 Troponin's:No lab results found.  Most Recent Cholesterol Panel:  Recent Labs   Lab Test  05/23/14   0745   CHOL  134   LDL  48   HDL  34*   TRIG  257*     Most Recent 6 Bacteria Isolates From Any Culture (See EPIC Reports for Culture Details):  Recent Labs   Lab Test  01/10/14   1507   CULT  Moderate growth Prevotella species Beta lactamase positive Moderate growth Strain 2 Prevotella species Susceptibility testing not routinely done*  Heavy growth Enterobacter  species Moderate growth Coagulase negative Staphylococcus Susceptibility testing not  routinely done Plus normal skin himanshu.*     Most Recent TSH, T4 and A1c Labs:  Recent Labs   Lab Test  01/21/17   0740   A1C  7.0*     Results for orders placed or performed during the hospital encounter of 01/20/17   US Renal Complete    Narrative    US RENAL COMPLETE 1/21/2017 8:15 AM     HISTORY: Assess for evidence of obstruction, mass, kidney size. No IV  contrast.    FINDINGS: The right kidney measured 12.7 cm in length with a cortical  thickness of 1.4 cm, and the left kidney 13.3 cm in length with a  cortical thickness of 1.8 cm. Bilateral renal cysts are noted  measuring 3.0 cm and 2.7 cm on the right and 5.2 cm on the left. Renal  cortical echogenicity appeared grossly normal. No hydronephrosis was  demonstrated. The urinary bladder was only partly distended. Enlarged  prostate versus 4.7 cm mass appears to indent the floor of the  bladder.      Impression    IMPRESSION:  1. No hydronephrosis.  2. Bilateral renal cysts.  3. Enlarged prostate versus mass indenting the bladder floor.    ASHU EDMONDSON MD

## 2017-01-23 NOTE — CONSULTS
[facility] Consultation      Tad Manning MRN# [id number]   YOB: 1940 Age: 76 year old   Date of Admission: 1/20/2017  Requesting physician: Patria Dee MD  Reason for consult: Anemia, thrombocytopenia, h/o CLL.           Assessment and Plan:   76 year-old male with CLL admitted for acute renal failure and anemia.    1. Chronic lymphocytic leukemia, Randle stage 0  2. Anemia  - Patient received FCR in 9/2012 with complete remission and has been on expectant observation since then.  He has no new lymphocytosis, lymphadenopathy, or organomegaly or constitutional symptoms at this time.  His anemia is acute and more likely due to acute illness.  His retic count and bilirubin are normal arguing against hemolytic anemia; smear is pending.  Will add LDH. Note B12 normal and iron studies show anemia of chronic disease.  - Otherwise, if his CBC is stable tomorrow AM, he could d/c home with repeat CBC at [facility] in 2 weeks.  - Follows with his primary oncologist Dr. Osiel England.    3. Acute renal failure, improved  - Prerenal, creatinine improved after IV hydration.    4. Back pain  - Has Norco and acetaminophen prn pain.    Full code.    Thank you for the consult request.  Please call if questions.               Chief Complaint:   No chief complaint on file.  Back pain and dizziness.         History of Present Illness:   This patient is a 76 year old male, a patient of my colleague Dr. 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  He was last seen by Dr. England on 11/29/2016 with CBC that showed WBC 5.9 (63% neutrophils, 26% lymphocytes), Hgb 12.9, MCV 89.1, platelets 131,000.    He now presents with back pain and dizziness.  He has been found to be in acute renal failure with creatinine 4.2 on 17.  He had outside x-rays and CT abdomen that were unremarkable. 17 CBC shows Hgb 9.1, MCV87, WBC 9.7, ANC 7.8, ALC 1, platelets 158,000.  CBC today () shows Hgb 8.2, WBC 5.9, plts 115,000. Peripheral blood smear from  pending.  B12 is 404.  Absolute retic count 37.  Iron 28, TIBC 182, iron saturation index 15.  Total bilirubin 0.7. Renal U/S showed no hydronephrosis.  creatinine now improved to 1.01 with hydration. Current back improved with Norco, but he complains of constipation while on Norco.  Denies recent fevers or chills.         Physical Exam:   Vitals were reviewed  Blood pressure 127/61, pulse 70, temperature 99.4  F (37.4  C), temperature source Oral, resp. rate 18, weight 85.6 kg (188 lb 11.4 oz), SpO2 96 %.  Temperatures:  Current - Temp: 99.4  F (37.4  C); Max - Temp  Av.5  F (36.9  C)  Min: 97.9  F (36.6  C)  Max: 99.4  F (37.4  C)  Respiration range: Resp  Av  Min: 18  Max: 18  Pulse range: No Data Recorded  Blood pressure range: Systolic (24hrs), Av mmHg, Min:127 mmHg, Max:140 mmHg  ; Diastolic (24hrs), Av mmHg, Min:57 mmHg, Max:63 mmHg    Pulse oximetry range: SpO2  Av.8 %  Min: 96 %  Max: 99 %    Intake/Output Summary (Last 24 hours) at 17 1312  Last data filed at 17 1200   Gross per 24 hour   Intake   2967 ml   Output   1565 ml   Net   1402 ml       GENERAL: No acute distress.  SKIN: No rashes or jaundice.  HEENT: Normocephalic, atraumatic. Eyes anicteric. Oropharynx is clear.  LYMPH: No palpable lymphadenopathy in the cervical, supraclavicular, axillary, or inguinal regions.  HEART: Regular rate and rhythm with no murmurs.  LUNGS: Clear bilaterally.  ABDOMEN: Soft, nontender, nondistended with no palpable hepatosplenomegaly.  EXTREMITIES: No clubbing, cyanosis, or edema.  MENTAL: Alert and oriented to person, place, and time.  NEURO: Cranial nerves II  through XII grossly intact with no focal motor deficits.            Past Medical History:   I have reviewed this patient's past medical history  Past Medical History   Diagnosis Date     Coronary artery disease      triple bypass 1995     Diabetes mellitus (H)      History of blood transfusion      Hypertension      Malignant neoplasm (H)      CLL     Hyperlipidaemia      Sebaceous cyst      BMI 30.0-30.9,adult      BPH (benign prostatic hypertrophy)      Cellulitis      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)      Noninflammatory pericardial effusion      Osteomyelitis of left foot (H)      PVD (peripheral vascular disease) (H)      Diabetic polyneuropathy (H)      ASCVD (arteriosclerotic cardiovascular disease)      Arthritis              Past Surgical History:   I have reviewed this patient's past surgical history  Past Surgical History   Procedure Laterality Date     Tonsillectomy       Bone marrow biopsy, bone specimen, needle/trocar  10/19/2012     Procedure: BIOPSY BONE MARROW;  BONE MARROW BIOPSY  (CONSCIOUS SED);  Surgeon: Ramon Quesada MD;  Location:  GI     Vascular surgery       ptcr legs     Bypass graft femoropopliteal  1/10/2014     Procedure: BYPASS GRAFT FEMOROPOPLITEAL;  Left above knee popliteal to  Below knee popliteal bypass using transverse saphenous vein graft. Left 2nd Toe amputation;  Surgeon: Amador Vick MD;  Location:  OR     Amputate toe(s)  1/10/2014     Procedure: AMPUTATE TOE(S);;  Surgeon: Amador Vick MD;  Location:  OR     Graft vein from extremity (location)  1/10/2014     Procedure: GRAFT VEIN FROM EXTREMITY (LOCATION);;  Surgeon: Amador Vick MD;  Location:  OR     Cardiac surgery       angioplasty with stent, triple bypass     Excise cyst generic (location) N/A 2/1/2016     Procedure: EXCISE CYST GENERIC (LOCATION);  Surgeon: Amador Vick MD;  Location: [facility]               Social History:   I have  reviewed this patient's social history  Social History   Substance Use Topics     Smoking status: Former Smoker -- 2.00 packs/day for 30 years     Types: Cigarettes     Quit date: 06/19/1995     Smokeless tobacco: Never Used     Alcohol Use: Yes      Comment: 1 drink monthly             Family History:   I have reviewed this patient's family history  Family History   Problem Relation Age of Onset     CANCER Mother      leukemia             Allergies:     Allergies   Allergen Reactions     Blood Transfusion Related (Informational Only) Other (See Comments)     Patient has a history of a clinically significant antibody against RBC antigens.  A delay in compatible RBCs may occur.              Medications:   I have reviewed this patient's current medications  Prescriptions prior to admission   Medication Sig Dispense Refill Last Dose     diphenhydrAMINE-acetaminophen (TYLENOL PM)  MG tablet Take 2 tablets by mouth nightly as needed for sleep   prn     TRAMADOL HCL PO Take  mg by mouth 3 times daily as needed for moderate to severe pain        IBUPROFEN PO Take 600 mg by mouth every morning    1/20/2017 at am     MAGNESIUM PO Take 250 mg by mouth daily    1/19/2017 at pm     tamsulosin (FLOMAX) 0.4 MG 24 hr capsule Take 0.8 mg by mouth At Bedtime    1/19/2017 at hs     liraglutide (VICTOZA) 18 MG/3ML soln Inject 1.2 mg Subcutaneous daily    1/20/2017 at am     METFORMIN HCL PO Take 1,000 mg by mouth 2 times daily (with meals)    1/20/2017 at am     ZOLPIDEM TARTRATE PO Take 5 mg by mouth nightly as needed    prn     clopidogrel (PLAVIX) 75 MG tablet Take 1 tablet (75 mg) by mouth daily 90 tablet 3 1/20/2017 at am     Cholecalciferol (VITAMIN D PO) Take 1,000 Units by mouth daily.    1/20/2017 at am     atorvastatin (LIPITOR) 40 MG tablet Take 1 tablet by mouth daily. 30 tablet 1 1/19/2017 at pm     Ascorbic Acid (VITAMIN C PO) Take 500 mg by mouth daily.   1/19/2017 at pm     ASPIRIN PO Take 81 mg by mouth  daily.   1/19/2017 at pm     Nitroglycerin (NITROQUICK SL) Place 0.4 mg under the tongue as needed.   prn     ATENOLOL PO Take 25 mg by mouth 2 times daily.   1/20/2017 at am     order for DME Equipment being ordered: DH2 shoe 1 Device 0 Taking     LISINOPRIL PO Take 20 mg by mouth daily   ON HOLD     AMLODIPINE BESYLATE PO Take 5 mg by mouth daily    ON HOLD     lisinopril-hydrochlorothiazide (PRINZIDE,ZESTORETIC) 20-25 MG per tablet Take 1 tablet by mouth daily.   ON HOLD     GLIPIZIDE PO Take 5 mg by mouth 2 times daily (before meals).     on HOLD     Current Facility-Administered Medications Ordered in Epic   Medication Dose Route Frequency Last Rate Last Dose     glipiZIDE (GLUCOTROL) half-tab 2.5 mg  2.5 mg Oral BID AC   2.5 mg at 01/23/17 1015     artificial saliva (BIOTENE MT) spray 1 spray  1 spray Mouth/Throat Q6H PRN   1 spray at 01/22/17 0914     cholecalciferol (vitamin D) tablet 1,000 Units  1,000 Units Oral Daily   1,000 Units at 01/23/17 0733     bacitracin-polymyxin b (POLYSPORIN) ointment   Topical BID         naloxone (NARCAN) injection 0.1-0.4 mg  0.1-0.4 mg Intravenous Q2 Min PRN         lidocaine 1 % 1 mL  1 mL Other Q1H PRN         lidocaine (LMX4) cream   Topical Q1H PRN         sodium chloride (PF) 0.9% PF flush 3 mL  3 mL Intracatheter Q1H PRN         sodium chloride (PF) 0.9% PF flush 3 mL  3 mL Intracatheter Q8H   3 mL at 01/23/17 0744     acetaminophen (TYLENOL) tablet 650 mg  650 mg Oral Q4H PRN   650 mg at 01/21/17 1901     acetaminophen (TYLENOL) Suppository 650 mg  650 mg Rectal Q4H PRN         HYDROcodone-acetaminophen (NORCO) 5-325 MG per tablet 1-2 tablet  1-2 tablet Oral Q4H PRN   2 tablet at 01/22/17 2220     HYDROmorphone (PF) (DILAUDID) injection 0.3-0.5 mg  0.3-0.5 mg Intravenous Q2H PRN         senna-docusate (SENOKOT-S;PERICOLACE) 8.6-50 MG per tablet 1-2 tablet  1-2 tablet Oral BID PRN   2 tablet at 01/23/17 0801     polyethylene glycol (MIRALAX/GLYCOLAX) Packet 17 g  17 g  Oral Daily PRN         bisacodyl (DULCOLAX) Suppository 10 mg  10 mg Rectal Daily PRN   10 mg at 01/23/17 1015     ondansetron (ZOFRAN-ODT) ODT tab 4 mg  4 mg Oral Q6H PRN   4 mg at 01/21/17 0948    Or     ondansetron (ZOFRAN) injection 4 mg  4 mg Intravenous Q6H PRN         amLODIPine (NORVASC) tablet 5 mg  5 mg Oral Daily   5 mg at 01/23/17 0733     ascorbic acid (VITAMIN C) tablet 500 mg  500 mg Oral At Bedtime   500 mg at 01/22/17 2220     atenolol (TENORMIN) tablet 25 mg  25 mg Oral BID   25 mg at 01/23/17 0734     atorvastatin (LIPITOR) tablet 40 mg  40 mg Oral At Bedtime   40 mg at 01/22/17 2220     clopidogrel (PLAVIX) tablet 75 mg  75 mg Oral Daily   75 mg at 01/23/17 0733     aspirin chewable tablet 81 mg  81 mg Oral At Bedtime   81 mg at 01/22/17 2220     tamsulosin (FLOMAX) capsule 0.8 mg  0.8 mg Oral At Bedtime   0.8 mg at 01/22/17 2220     zolpidem (AMBIEN) tablet 5 mg  5 mg Oral At Bedtime PRN         glucose 40 % gel 15-30 g  15-30 g Oral Q15 Min PRN        Or     dextrose 50 % injection 25-50 mL  25-50 mL Intravenous Q15 Min PRN        Or     glucagon injection 1 mg  1 mg Subcutaneous Q15 Min PRN         insulin aspart (NovoLOG) inj (RAPID ACTING)  1-7 Units Subcutaneous TID AC   2 Units at 01/23/17 1205     insulin aspart (NovoLOG) inj (RAPID ACTING)  1-5 Units Subcutaneous At Bedtime   1 Units at 01/22/17 2224     calcium carbonate (TUMS) chewable tablet 500-1,000 mg  500-1,000 mg Oral Q2H PRN   1,000 mg at 01/22/17 2229     No current Epic-ordered outpatient prescriptions on file.             Review of Systems:   The 10 point Review of Systems is negative other than noted in the HPI.            Data:   All laboratory data reviewed  Results for orders placed or performed during the hospital encounter of 01/20/17 (from the past 24 hour(s))   CBC with platelets differential   Result Value Ref Range    WBC 6.0 4.0 - 11.0 10e9/L    RBC Count 2.83 (L) 4.4 - 5.9 10e12/L    Hemoglobin 8.2 (L) 13.3 - 17.7  g/dL    Hematocrit 24.6 (L) 40.0 - 53.0 %    MCV 87 78 - 100 fl    MCH 29.0 26.5 - 33.0 pg    MCHC 33.3 31.5 - 36.5 g/dL    RDW 14.9 10.0 - 15.0 %    Platelet Count 118 (L) 150 - 450 10e9/L    Diff Method Automated Method     % Neutrophils 72.6 %    % Lymphocytes 17.4 %    % Monocytes 8.1 %    % Eosinophils 1.5 %    % Basophils 0.2 %    % Immature Granulocytes 0.2 %    Nucleated RBCs 0 0 /100    Absolute Neutrophil 4.4 1.6 - 8.3 10e9/L    Absolute Lymphocytes 1.1 0.8 - 5.3 10e9/L    Absolute Monocytes 0.5 0.0 - 1.3 10e9/L    Absolute Eosinophils 0.1 0.0 - 0.7 10e9/L    Absolute Basophils 0.0 0.0 - 0.2 10e9/L    Abs Immature Granulocytes 0.0 0 - 0.4 10e9/L    Absolute Nucleated RBC 0.0    ABO/Rh type and screen   Result Value Ref Range    Units Ordered 2     ABO A     RH(D)  Pos     Antibody Screen Neg     Test Valid Only At [facility]     Specimen Expires 01/25/2017     Crossmatch Red Blood Cells    Reticulocyte count   Result Value Ref Range    % Retic 1.3 0.5 - 2.0 %    Absolute Retic 37.0 25 - 95 10e9/L   Blood component   Result Value Ref Range    Unit Number [id number]     Blood Component Type Red Blood Cells Leukocyte Reduced     Division Number 00     Status of Unit Ready for patient 01/23/2017 1141     Blood Product Code T6969K65     Unit Status ARLINE    Blood component   Result Value Ref Range    Unit Number [id number]     Blood Component Type Red Blood Cells Leukocyte Reduced     Division Number 00     Status of Unit Ready for patient 01/23/2017 1141     Blood Product Code I2434J61     Unit Status ARLINE    Glucose by meter   Result Value Ref Range    Glucose 195 (H) 70 - 99 mg/dL   Glucose by meter   Result Value Ref Range    Glucose 200 (H) 70 - 99 mg/dL   Protein  random urine   Result Value Ref Range    Protein Random Urine  g/L     Canceled, Test credited   Unsatisfactory specimen - contaminated  CORRECTED ON 01/23 AT 0313: PREVIOUSLY REPORTED AS 0.73      Protein Total Urine g/gr  Creatinine  0 - 0.2 g/g Cr     Canceled, Test credited   Unsatisfactory specimen - contaminated  CORRECTED ON 01/23 AT 0313: PREVIOUSLY REPORTED AS 0.99     UA with Microscopic   Result Value Ref Range    Color Urine       Canceled, Test credited   Unsatisfactory specimen - contaminated  CORRECTED ON 01/23 AT 0313: PREVIOUSLY REPORTED AS Light Yellow      Appearance Urine       Canceled, Test credited   Unsatisfactory specimen - contaminated  CORRECTED ON 01/23 AT 0313: PREVIOUSLY REPORTED AS Clear      Glucose Urine (A) NEG mg/dL     Canceled, Test credited   Unsatisfactory specimen - contaminated  CORRECTED ON 01/23 AT 0313: PREVIOUSLY REPORTED       Bilirubin Urine (A) NEG     Canceled, Test credited   Unsatisfactory specimen - contaminated  CORRECTED ON 01/23 AT 0313: PREVIOUSLY REPORTED AS Negative      Ketones Urine (A) NEG mg/dL     Canceled, Test credited   Unsatisfactory specimen - contaminated  CORRECTED ON 01/23 AT 0313: PREVIOUSLY REPORTED AS Negative      Specific Gravity Urine  1.003 - 1.035     Canceled, Test credited   Unsatisfactory specimen - contaminated  CORRECTED ON 01/23 AT 0313: PREVIOUSLY REPORTED AS 1.012      Blood Urine (A) NEG     Canceled, Test credited   Unsatisfactory specimen - contaminated  CORRECTED ON 01/23 AT 0313: PREVIOUSLY REPORTED AS Negative      pH Urine  5.0 - 7.0 pH     Canceled, Test credited   Unsatisfactory specimen - contaminated  CORRECTED ON 01/23 AT 0313: PREVIOUSLY REPORTED AS 5.5      Protein Albumin Urine (A) NEG mg/dL     Canceled, Test credited   Unsatisfactory specimen - contaminated  CORRECTED ON 01/23 AT 0313: PREVIOUSLY REPORTED AS 30      Urobilinogen mg/dL  0.0 - 2.0 mg/dL     Canceled, Test credited   Unsatisfactory specimen - contaminated  CORRECTED ON 01/23 AT 0313: PREVIOUSLY REPORTED AS Normal      Nitrite Urine (A) NEG     Canceled, Test credited   Unsatisfactory specimen - contaminated  CORRECTED ON 01/23 AT 0313: PREVIOUSLY REPORTED AS  Negative      Leukocyte Esterase Urine (A) NEG     Canceled, Test credited   Unsatisfactory specimen - contaminated  CORRECTED ON 01/23 AT 0313: PREVIOUSLY REPORTED AS Negative      Source       Canceled, Test credited   Unsatisfactory specimen - contaminated  CORRECTED ON 01/23 AT 0313: PREVIOUSLY REPORTED AS Midstream Urine      WBC Urine (A) 0 - 2 /HPF     Canceled, Test credited   Unsatisfactory specimen - contaminated  CORRECTED ON 01/23 AT 0313: PREVIOUSLY REPORTED AS 1      RBC Urine  0 - 2 /HPF     Canceled, Test credited   Unsatisfactory specimen - contaminated  CORRECTED ON 01/23 AT 0313: PREVIOUSLY REPORTED AS 1      Bacteria Urine Few (A) NEG /HPF   Creatinine urine calculation only   Result Value Ref Range    Creatinine Urine  mg/dL     Canceled, Test credited   Unsatisfactory specimen - contaminated  CORRECTED ON 01/23 AT 0313: PREVIOUSLY REPORTED AS 74     UA with Microscopic   Result Value Ref Range    Color Urine Yellow     Appearance Urine Clear     Glucose Urine Negative NEG mg/dL    Bilirubin Urine Negative NEG    Ketones Urine Negative NEG mg/dL    Specific Gravity Urine 1.016 1.003 - 1.035    Blood Urine Negative NEG    pH Urine 5.5 5.0 - 7.0 pH    Protein Albumin Urine 30 (A) NEG mg/dL    Urobilinogen mg/dL Normal 0.0 - 2.0 mg/dL    Nitrite Urine Negative NEG    Leukocyte Esterase Urine Negative NEG    Source Midstream Urine     WBC Urine 1 0 - 2 /HPF    RBC Urine 1 0 - 2 /HPF    Mucous Urine Present (A) NEG /LPF   Creatinine urine calculation only   Result Value Ref Range    Creatinine Urine 130 mg/dL   Protein  random urine   Result Value Ref Range    Protein Random Urine 0.88 g/L    Protein Total Urine g/gr Creatinine 0.68 (H) 0 - 0.2 g/g Cr   Basic metabolic panel   Result Value Ref Range    Sodium 135 133 - 144 mmol/L    Potassium 3.8 3.4 - 5.3 mmol/L    Chloride 103 94 - 109 mmol/L    Carbon Dioxide 23 20 - 32 mmol/L    Anion Gap 9 3 - 14 mmol/L    Glucose 184 (H) 70 - 99 mg/dL    Urea  Nitrogen 19 7 - 30 mg/dL    Creatinine 1.01 0.66 - 1.25 mg/dL    GFR Estimate 72 >60 mL/min/1.7m2    GFR Estimate If Black 87 >60 mL/min/1.7m2    Calcium 8.5 8.5 - 10.1 mg/dL   CBC with platelets differential   Result Value Ref Range    WBC 5.9 4.0 - 11.0 10e9/L    RBC Count 2.82 (L) 4.4 - 5.9 10e12/L    Hemoglobin 8.2 (L) 13.3 - 17.7 g/dL    Hematocrit 24.2 (L) 40.0 - 53.0 %    MCV 86 78 - 100 fl    MCH 29.1 26.5 - 33.0 pg    MCHC 33.9 31.5 - 36.5 g/dL    RDW 15.0 10.0 - 15.0 %    Platelet Count 115 (L) 150 - 450 10e9/L    Diff Method Automated Method     % Neutrophils 74.1 %    % Lymphocytes 18.6 %    % Monocytes 6.2 %    % Eosinophils 0.9 %    % Basophils 0.0 %    % Immature Granulocytes 0.2 %    Nucleated RBCs 0 0 /100    Absolute Neutrophil 4.3 1.6 - 8.3 10e9/L    Absolute Lymphocytes 1.1 0.8 - 5.3 10e9/L    Absolute Monocytes 0.4 0.0 - 1.3 10e9/L    Absolute Eosinophils 0.1 0.0 - 0.7 10e9/L    Absolute Basophils 0.0 0.0 - 0.2 10e9/L    Abs Immature Granulocytes 0.0 0 - 0.4 10e9/L    Absolute Nucleated RBC 0.0    Glucose by meter   Result Value Ref Range    Glucose 172 (H) 70 - 99 mg/dL   Glucose by meter   Result Value Ref Range    Glucose 208 (H) 70 - 99 mg/dL

## 2017-01-24 ENCOUNTER — APPOINTMENT (OUTPATIENT)
Dept: CT IMAGING | Facility: CLINIC | Age: 77
DRG: 682 | End: 2017-01-24
Attending: HOSPITALIST
Payer: MEDICARE

## 2017-01-24 LAB
BASOPHILS # BLD AUTO: 0 10E9/L (ref 0–0.2)
BASOPHILS NFR BLD AUTO: 0.1 %
DIFFERENTIAL METHOD BLD: ABNORMAL
EOSINOPHIL # BLD AUTO: 0.1 10E9/L (ref 0–0.7)
EOSINOPHIL NFR BLD AUTO: 1.3 %
ERYTHROCYTE [DISTWIDTH] IN BLOOD BY AUTOMATED COUNT: 14.9 % (ref 10–15)
GLUCOSE BLDC GLUCOMTR-MCNC: 120 MG/DL (ref 70–99)
GLUCOSE BLDC GLUCOMTR-MCNC: 129 MG/DL (ref 70–99)
GLUCOSE BLDC GLUCOMTR-MCNC: 148 MG/DL (ref 70–99)
GLUCOSE BLDC GLUCOMTR-MCNC: 161 MG/DL (ref 70–99)
HCT VFR BLD AUTO: 25.3 % (ref 40–53)
HGB BLD-MCNC: 8.6 G/DL (ref 13.3–17.7)
IMM GRANULOCYTES # BLD: 0 10E9/L (ref 0–0.4)
IMM GRANULOCYTES NFR BLD: 0.3 %
INTERPRETATION ECG - MUSE: NORMAL
LDH SERPL L TO P-CCNC: 122 U/L (ref 85–227)
LYMPHOCYTES # BLD AUTO: 0.8 10E9/L (ref 0.8–5.3)
LYMPHOCYTES NFR BLD AUTO: 12.2 %
MCH RBC QN AUTO: 28.8 PG (ref 26.5–33)
MCHC RBC AUTO-ENTMCNC: 34 G/DL (ref 31.5–36.5)
MCV RBC AUTO: 85 FL (ref 78–100)
MONOCYTES # BLD AUTO: 0.4 10E9/L (ref 0–1.3)
MONOCYTES NFR BLD AUTO: 6.2 %
NEUTROPHILS # BLD AUTO: 5.4 10E9/L (ref 1.6–8.3)
NEUTROPHILS NFR BLD AUTO: 79.9 %
NRBC # BLD AUTO: 0 10*3/UL
NRBC BLD AUTO-RTO: 0 /100
PLATELET # BLD AUTO: 131 10E9/L (ref 150–450)
RBC # BLD AUTO: 2.99 10E12/L (ref 4.4–5.9)
WBC # BLD AUTO: 6.8 10E9/L (ref 4–11)

## 2017-01-24 PROCEDURE — A9270 NON-COVERED ITEM OR SERVICE: HCPCS | Mod: GY | Performed by: HOSPITALIST

## 2017-01-24 PROCEDURE — 25000132 ZZH RX MED GY IP 250 OP 250 PS 637: Mod: GY | Performed by: HOSPITALIST

## 2017-01-24 PROCEDURE — 70450 CT HEAD/BRAIN W/O DYE: CPT

## 2017-01-24 PROCEDURE — 00000146 ZZHCL STATISTIC GLUCOSE BY METER IP

## 2017-01-24 PROCEDURE — 83615 LACTATE (LD) (LDH) ENZYME: CPT | Performed by: INTERNAL MEDICINE

## 2017-01-24 PROCEDURE — 12000000 ZZH R&B MED SURG/OB

## 2017-01-24 PROCEDURE — 25000128 H RX IP 250 OP 636: Performed by: HOSPITALIST

## 2017-01-24 PROCEDURE — 36415 COLL VENOUS BLD VENIPUNCTURE: CPT | Performed by: INTERNAL MEDICINE

## 2017-01-24 PROCEDURE — 99232 SBSQ HOSP IP/OBS MODERATE 35: CPT | Performed by: HOSPITALIST

## 2017-01-24 PROCEDURE — 85025 COMPLETE CBC W/AUTO DIFF WBC: CPT | Performed by: INTERNAL MEDICINE

## 2017-01-24 RX ORDER — MIDODRINE HYDROCHLORIDE 2.5 MG/1
2.5 TABLET ORAL 2 TIMES DAILY
Status: DISCONTINUED | OUTPATIENT
Start: 2017-01-24 | End: 2017-01-25 | Stop reason: HOSPADM

## 2017-01-24 RX ORDER — TRAMADOL HYDROCHLORIDE 50 MG/1
50 TABLET ORAL EVERY 6 HOURS PRN
Status: DISCONTINUED | OUTPATIENT
Start: 2017-01-24 | End: 2017-01-25 | Stop reason: HOSPADM

## 2017-01-24 RX ORDER — ATENOLOL 25 MG/1
25 TABLET ORAL DAILY
Status: DISCONTINUED | OUTPATIENT
Start: 2017-01-25 | End: 2017-01-25 | Stop reason: HOSPADM

## 2017-01-24 RX ORDER — SODIUM CHLORIDE 9 MG/ML
INJECTION, SOLUTION INTRAVENOUS CONTINUOUS
Status: DISCONTINUED | OUTPATIENT
Start: 2017-01-24 | End: 2017-01-25

## 2017-01-24 RX ORDER — LIDOCAINE 50 MG/G
1 PATCH TOPICAL
Status: DISCONTINUED | OUTPATIENT
Start: 2017-01-24 | End: 2017-01-25 | Stop reason: HOSPADM

## 2017-01-24 RX ADMIN — TRAMADOL HYDROCHLORIDE 50 MG: 50 TABLET, COATED ORAL at 19:52

## 2017-01-24 RX ADMIN — OXYCODONE HYDROCHLORIDE AND ACETAMINOPHEN 500 MG: 500 TABLET ORAL at 21:54

## 2017-01-24 RX ADMIN — AMLODIPINE BESYLATE 5 MG: 5 TABLET ORAL at 08:49

## 2017-01-24 RX ADMIN — ASPIRIN 81 MG 81 MG: 81 TABLET ORAL at 21:54

## 2017-01-24 RX ADMIN — LIRAGLUTIDE 1.2 MG: 6 INJECTION SUBCUTANEOUS at 09:02

## 2017-01-24 RX ADMIN — Medication 2.5 MG: at 16:07

## 2017-01-24 RX ADMIN — SODIUM CHLORIDE: 9 INJECTION, SOLUTION INTRAVENOUS at 13:13

## 2017-01-24 RX ADMIN — Medication 2.5 MG: at 08:50

## 2017-01-24 RX ADMIN — VITAMIN D, TAB 1000IU (100/BT) 1000 UNITS: 25 TAB at 08:49

## 2017-01-24 RX ADMIN — CLOPIDOGREL BISULFATE 75 MG: 75 TABLET, FILM COATED ORAL at 08:50

## 2017-01-24 RX ADMIN — INSULIN ASPART 1 UNITS: 100 INJECTION, SOLUTION INTRAVENOUS; SUBCUTANEOUS at 08:47

## 2017-01-24 RX ADMIN — Medication 1 SPRAY: at 11:40

## 2017-01-24 RX ADMIN — TRAMADOL HYDROCHLORIDE 50 MG: 50 TABLET, COATED ORAL at 08:51

## 2017-01-24 RX ADMIN — ATENOLOL 25 MG: 25 TABLET ORAL at 08:50

## 2017-01-24 RX ADMIN — BACITRACIN ZINC AND POLYMYXIN B SULFATE: 500; 10000 OINTMENT TOPICAL at 08:56

## 2017-01-24 RX ADMIN — CARBIDOPA AND LEVODOPA 2.5 MG: 50; 200 TABLET, EXTENDED RELEASE ORAL at 14:37

## 2017-01-24 RX ADMIN — ATORVASTATIN CALCIUM 40 MG: 40 TABLET, FILM COATED ORAL at 21:54

## 2017-01-24 RX ADMIN — TAMSULOSIN HYDROCHLORIDE 0.8 MG: 0.4 CAPSULE ORAL at 21:54

## 2017-01-24 RX ADMIN — INSULIN ASPART 1 UNITS: 100 INJECTION, SOLUTION INTRAVENOUS; SUBCUTANEOUS at 12:05

## 2017-01-24 RX ADMIN — LIDOCAINE 1 PATCH: 50 PATCH TOPICAL at 08:57

## 2017-01-24 NOTE — PLAN OF CARE
Problem: Goal Outcome Summary  Goal: Goal Outcome Summary  Outcome: Improving  A/O X4. Afebrile this shift. Pt complained of 2/10 pain, declined medication intervention, used 1 hot pack on back. CHO diet.  Assist of 1 with walker/gait belt.  Unable to go down to CT as he is unable to lay flat due to back pain.  Continue to educate and encourage CT.  Possible DC today.

## 2017-01-24 NOTE — PLAN OF CARE
Problem: Goal Outcome Summary  Goal: Goal Outcome Summary  Outcome: No Change  Pt is alert and oriented X4.  T99.2, other VSS.  Pt complained of 5/10 pain, given tylenol and hot packs with some relief.  Blood sugar was 135, no insulin coverage needed.  CHO diet.  Assist of 1 with walker/gait belt.  Will continue to monitor.

## 2017-01-24 NOTE — PROGRESS NOTES
CLINICAL NUTRITION SERVICES - REASSESSMENT NOTE      RECOMMENDATIONS FOR MD/PROVIDER TO ORDER: Consider an appetite stimulant        EVALUATION OF PROGRESS TOWARD GOALS   Diet:  Moderate CHO diet + Nepro BID between meals     Intake:  Visited with patient today.  He states that his appetite is still poor.  Also dealing with early satiety and taste changes.  Was able to eat 50% of the eggs and 50% of the sausage this morning for breakfast.  Lunch consisted of 2 spoonfuls of chicken noodle soup and 2 glasses of lemonade.  Has been drinking ana 100% of the Nepro supplement drinks.       NEW FINDINGS:   Plan for d/c home tomorrow     Previous Goals (1/21):   Pt to consume 50% of meals + 1 supplement daily  Evaluation: Not met consistently     Previous Nutrition Diagnosis (1/21):   Inadequate oral intake related to poor appetite as evidenced by reported wt loss 18% in 3 months.   Evaluation: No change      CURRENT NUTRITION DIAGNOSIS  Inadequate oral intake related to poor appetite and early satiety + taste changes as evidenced by minimal intake for lunch today     INTERVENTIONS  Recommendations / Nutrition Prescription  Moderate CHO diet as tolerated  Continue Nepro BID between meals  Consider an appetite stimulant     Implementation  None     Goals  Intake will improve to 50% of meals TID and supplements BID     MONITORING AND EVALUATION:  Progress towards goals will be monitored and evaluated per protocol and Practice Guidelines    Diane Etienne RD, LD, CNSC   Clinical Dietitian - [facility]

## 2017-01-24 NOTE — PROGRESS NOTES
76 year-old pt of Dr. England with CLL treated in 2012 and in remission, admitted for acute renal failure and anemia.  Met with pt today, he feels well but c/o of chronic back pain  Will have CT ordered today    Exam shows no lymphadenopathy or hepatosplenomegaly.  Smear shows no evidence of CLL    Impression:  1. Chronic lymphocytic leukemia, Randle stage 0 currently in CR  2. Anemia  - Patient received FCR in 9/2012 with complete remission and has been on expectant observation since then.  His anemia is acute and more likely due to acute illness.  His retic count LDH B12 and bilirubin are normal and iron studies show anemia of chronic disease.  - OK to d/c home from heme standpoint with repeat CBC at [facility] in 2 weeks.  - Follows with his primary oncologist Dr. Osiel England.    3. Acute renal failure, improved (may have contributed to anemia).

## 2017-01-24 NOTE — PLAN OF CARE
Problem: Goal Outcome Summary  Goal: Goal Outcome Summary  Outcome: No Change  A/O X4, up A1 with walker. Noted orthostatic hypotension (lying)142/68 (standing)90/47, dizzy upon standing, MD notified. IV fluids and proamatine started, TEDS applied. Head CT, see results. C/o back pain, hesitant to take meds, agreed to Ultram x1 and Lidocaine patch with moderate pain relief. Possible DC tomorrow pending dizziness and back pain improvement.

## 2017-01-24 NOTE — PLAN OF CARE
Problem: Goal Outcome Summary  Goal: Goal Outcome Summary  A&O.  Afebrile.  Heat packs given for C/O back pain.  Head CT attempted today, not completed due to pt not being able to lay flat.  Tolerating diet.  Up with A x 1 and walker. Plan for possible discharge tomorrow.  Will continue to monitor.

## 2017-01-24 NOTE — PROVIDER NOTIFICATION
MD Notification    Notified Person:  MD    Notified Persons Name:     Notification Date/Time: 1/24/2017 1230    Notification Interaction:  Text paged Physician    Purpose of Notification: Pt dizzy, BP lying 142/68, BP standing 90/47.     Orders Received: 0.9% NS 125mL/hr for 1 L then stop.     Comments:

## 2017-01-25 VITALS
DIASTOLIC BLOOD PRESSURE: 76 MMHG | SYSTOLIC BLOOD PRESSURE: 147 MMHG | TEMPERATURE: 98.3 F | WEIGHT: 188.93 LBS | BODY MASS INDEX: 25.62 KG/M2 | HEART RATE: 70 BPM | RESPIRATION RATE: 16 BRPM | OXYGEN SATURATION: 95 %

## 2017-01-25 LAB — GLUCOSE BLDC GLUCOMTR-MCNC: 127 MG/DL (ref 70–99)

## 2017-01-25 PROCEDURE — 99239 HOSP IP/OBS DSCHRG MGMT >30: CPT | Performed by: HOSPITALIST

## 2017-01-25 PROCEDURE — 25000132 ZZH RX MED GY IP 250 OP 250 PS 637: Mod: GY | Performed by: HOSPITALIST

## 2017-01-25 PROCEDURE — 00000146 ZZHCL STATISTIC GLUCOSE BY METER IP

## 2017-01-25 PROCEDURE — A9270 NON-COVERED ITEM OR SERVICE: HCPCS | Mod: GY | Performed by: HOSPITALIST

## 2017-01-25 RX ORDER — ATENOLOL 25 MG/1
25 TABLET ORAL DAILY
Qty: 30 TABLET | Refills: 0 | Status: ON HOLD | OUTPATIENT
Start: 2017-01-25 | End: 2017-03-18

## 2017-01-25 RX ORDER — MIDODRINE HYDROCHLORIDE 2.5 MG/1
2.5 TABLET ORAL 2 TIMES DAILY
Qty: 60 TABLET | Refills: 0 | Status: SHIPPED | OUTPATIENT
Start: 2017-01-25 | End: 2017-02-08

## 2017-01-25 RX ORDER — GLIPIZIDE 5 MG/1
2.5 TABLET ORAL
Qty: 30 TABLET | Refills: 0 | Status: SHIPPED | OUTPATIENT
Start: 2017-01-25 | End: 2017-02-07

## 2017-01-25 RX ORDER — TRAMADOL HYDROCHLORIDE 50 MG/1
50 TABLET ORAL EVERY 6 HOURS PRN
Qty: 40 TABLET | Refills: 0 | Status: SHIPPED | OUTPATIENT
Start: 2017-01-25 | End: 2017-02-08

## 2017-01-25 RX ADMIN — CARBIDOPA AND LEVODOPA 2.5 MG: 50; 200 TABLET, EXTENDED RELEASE ORAL at 07:39

## 2017-01-25 RX ADMIN — BACITRACIN ZINC AND POLYMYXIN B SULFATE: 500; 10000 OINTMENT TOPICAL at 07:53

## 2017-01-25 RX ADMIN — LIDOCAINE 1 PATCH: 50 PATCH TOPICAL at 07:42

## 2017-01-25 RX ADMIN — TRAMADOL HYDROCHLORIDE 50 MG: 50 TABLET, COATED ORAL at 07:50

## 2017-01-25 RX ADMIN — AMLODIPINE BESYLATE 5 MG: 5 TABLET ORAL at 07:39

## 2017-01-25 RX ADMIN — Medication 1 SPRAY: at 11:27

## 2017-01-25 RX ADMIN — VITAMIN D, TAB 1000IU (100/BT) 1000 UNITS: 25 TAB at 07:38

## 2017-01-25 RX ADMIN — SENNOSIDES AND DOCUSATE SODIUM 1 TABLET: 8.6; 5 TABLET ORAL at 07:51

## 2017-01-25 RX ADMIN — Medication 2.5 MG: at 07:39

## 2017-01-25 RX ADMIN — LIRAGLUTIDE 1.2 MG: 6 INJECTION SUBCUTANEOUS at 07:44

## 2017-01-25 RX ADMIN — ATENOLOL 25 MG: 25 TABLET ORAL at 07:39

## 2017-01-25 RX ADMIN — CLOPIDOGREL BISULFATE 75 MG: 75 TABLET, FILM COATED ORAL at 07:39

## 2017-01-25 RX ADMIN — CARBIDOPA AND LEVODOPA 2.5 MG: 50; 200 TABLET, EXTENDED RELEASE ORAL at 11:28

## 2017-01-25 NOTE — PLAN OF CARE
Problem: Goal Outcome Summary  Goal: Goal Outcome Summary  A&O.  Tmax 99.5.  Positive orthostatic BPs, MD aware.  Ultram and heat packs given for C/O back pain.  Tolerating diet.  Up with A x 1 and walker ambulating in halls. Plan for possible discharge tomorrow.  Will continue to monitor

## 2017-01-25 NOTE — PLAN OF CARE
Problem: Goal Outcome Summary  Goal: Goal Outcome Summary  Outcome: Improving  A&O x 4.  VSS, orthostatic BP negative.  C/O pain 2/10 in back, declines pain medication, using hotpack on lower back.  Uses bedside urinal.  Up with assist of 1.  Possible DC today pending orthostatic BPs.

## 2017-01-25 NOTE — DISCHARGE SUMMARY
VSS. Ambulated in hallway x 2. Discharge instructions and medications sent with pt. Belongings from security collected and given to pt. Sent home with brother and sister-in-law.

## 2017-01-26 ENCOUNTER — OFFICE VISIT (OUTPATIENT)
Dept: PODIATRY | Facility: CLINIC | Age: 77
End: 2017-01-26
Payer: MEDICARE

## 2017-01-26 VITALS — DIASTOLIC BLOOD PRESSURE: 54 MMHG | SYSTOLIC BLOOD PRESSURE: 106 MMHG

## 2017-01-26 DIAGNOSIS — E11.621 TYPE 2 DIABETES MELLITUS WITH FOOT ULCER, UNSPECIFIED LONG TERM INSULIN USE STATUS: Primary | ICD-10-CM

## 2017-01-26 DIAGNOSIS — L97.509 TYPE 2 DIABETES MELLITUS WITH FOOT ULCER, UNSPECIFIED LONG TERM INSULIN USE STATUS: Primary | ICD-10-CM

## 2017-01-26 LAB
BLD PROD TYP BPU: NORMAL
BLD PROD TYP BPU: NORMAL
BLD UNIT ID BPU: 0
BLD UNIT ID BPU: 0
BLOOD PRODUCT CODE: NORMAL
BLOOD PRODUCT CODE: NORMAL
BPU ID: NORMAL
BPU ID: NORMAL
TRANSFUSION STATUS PATIENT QL: NORMAL

## 2017-01-26 PROCEDURE — 97597 DBRDMT OPN WND 1ST 20 CM/<: CPT | Performed by: PODIATRIST

## 2017-01-26 PROCEDURE — 99212 OFFICE O/P EST SF 10 MIN: CPT | Mod: 25 | Performed by: PODIATRIST

## 2017-01-26 NOTE — MR AVS SNAPSHOT
After Visit Summary   1/26/2017    Tad Manning    MRN: [id number]           Patient Information     Date Of Birth          1940        Visit Information        Provider Department      1/26/2017 2:00 PM Enzo Alonso DPM [facility]        Care Instructions    Follow Up - in 4 weeks or sooner if wound worsens    Dr. Alonso's [facility] Locations:         Monday Tuesday   [facility]   [address], [address]   [place] [address] [place] [address]   [phone]       Wednesday:  Surgery Day    Surgery Scheduling Line - [phone]       Thursday Morning Thursday Afternoon   [facility]   [address] [facility] [address], [address]   [place] [address] [place], [place] [address] [phone]       Friday Morning To Schedule an Appointment    [facility] Call: [phone] [address] [place] [address] [phone] PLEASE FAX ALL FORMS TO: [phone]             Follow-ups after your visit        Your next 10 appointments already scheduled     Feb 23, 2017  9:30 AM   Return Visit with Enzo Alonso DPM   [facility] ([facility])    [address] [address] [phone]              Who to contact     If you have questions or need follow up information about today's clinic visit or your schedule please contact [facility] directly at [phone].  Normal or non-critical lab and imaging results will be communicated to you by MyChart, letter or phone within 4 business days after the clinic has received the results. If you do not hear from us within 7 days, please contact the clinic through MyChart or phone. If you have a critical or abnormal lab result, we will notify you by phone as soon as possible.  Submit refill requests through  "[employer] or call your pharmacy and they will forward the refill request to us. Please allow 3 business days for your refill to be completed.          Additional Information About Your Visit        MyChart Information     [employer] lets you send messages to your doctor, view your test results, renew your prescriptions, schedule appointments and more. To sign up, go to [email or web address] . Click on \"Log in\" on the left side of the screen, which will take you to the Welcome page. Then click on \"Sign up Now\" on the right side of the page.     You will be asked to enter the access code listed below, as well as some personal information. Please follow the directions to create your username and password.     Your access code is: [email or web address]  Expires: 2017  4:04 PM     Your access code will  in 90 days. If you need help or a new code, please call your [facility] clinic or [phone].        Care EveryWhere ID     This is your Care EveryWhere ID. This could be used by other organizations to access your [facility] medical records  [id number]         Blood Pressure from Last 3 Encounters:   17 106/54   17 147/76   16 104/50    Weight from Last 3 Encounters:   17 188 lb 15 oz (85.7 kg)   16 210 lb (95.255 kg)   16 210 lb (95.255 kg)              Today, you had the following     No orders found for display       Primary Care Provider Office Phone # Fax #    Gene Guevara [phone]       [facility] [address] [address]        Thank you!     Thank you for choosing [facility]  for your care. Our goal is always to provide you with excellent care. Hearing back from our patients is one way we can continue to improve our services. Please take a few minutes to complete the written survey that you may receive in the mail after your visit with us. Thank you!             Your Updated Medication List - Protect others around you: " Learn how to safely use, store and throw away your medicines at www.disposemymeds.org.          This list is accurate as of: 1/26/17  2:18 PM.  Always use your most recent med list.                   Brand Name Dispense Instructions for use    AMLODIPINE BESYLATE PO      Take 5 mg by mouth daily       ASPIRIN PO      Take 81 mg by mouth daily.       atenolol 25 MG tablet    TENORMIN    30 tablet    Take 1 tablet (25 mg) by mouth daily       atorvastatin 40 MG tablet    LIPITOR    30 tablet    Take 1 tablet by mouth daily.       bacitracin-polymyxin b ointment    POLYSPORIN    15 g    Apply topically 2 times daily       clopidogrel 75 MG tablet    PLAVIX    90 tablet    Take 1 tablet (75 mg) by mouth daily       glipiZIDE 5 MG tablet    GLUCOTROL    30 tablet    Take 0.5 tablets (2.5 mg) by mouth 2 times daily (before meals)       liraglutide 18 MG/3ML soln    VICTOZA     Inject 1.2 mg Subcutaneous daily       MAGNESIUM PO      Take 250 mg by mouth daily       midodrine 2.5 MG tablet    PROAMATINE    60 tablet    Take 1 tablet (2.5 mg) by mouth 2 times daily       NITROQUICK SL      Place 0.4 mg under the tongue as needed.       order for DME     1 Device    Equipment being ordered: DH2 shoe       polyethylene glycol Packet    MIRALAX/GLYCOLAX    7 packet    Take 17 g by mouth daily as needed for constipation       senna-docusate 8.6-50 MG per tablet    SENOKOT-S;PERICOLACE    100 tablet    Take 1-2 tablets by mouth 2 times daily as needed (constipation )       tamsulosin 0.4 MG capsule    FLOMAX     Take 0.8 mg by mouth At Bedtime       traMADol 50 MG tablet    ULTRAM    40 tablet    Take 1 tablet (50 mg) by mouth every 6 hours as needed       VITAMIN C PO      Take 500 mg by mouth daily.       VITAMIN D PO      Take 1,000 Units by mouth daily.       ZOLPIDEM TARTRATE PO      Take 5 mg by mouth nightly as needed

## 2017-01-26 NOTE — PATIENT INSTRUCTIONS
Follow Up - in 4 weeks or sooner if wound worsens    Dr. Alonso's Clinic Locations:         Monday Tuesday   [facility]   [address] [address] [address], [address]   [place] [address] [place] [address]   [phone]       Wednesday:  Surgery Day    Surgery Scheduling Line - [phone]       Thursday Morning Thursday Afternoon   [facility]   [address], [address]   [place] [address] [place] [address]   [phone]       Friday Morning To Schedule an Appointment    [facility] Call: [phone] [address]    [place], [place] [id number] [phone] PLEASE FAX ALL FORMS TO: [phone]

## 2017-01-26 NOTE — PROGRESS NOTES
Foot & Ankle Surgery   January 26, 2017    S:  Pt is seen today for evaluation of ulcer sub 5th MPJ R foot.  Missed last appointment 2/2 to being in the hospital for dehydration and acute renal failure.  Doing better now.  Has been off his foot and has been wearing the DH2 surgical shoe.  Wound doing better.    Filed Vitals:    01/26/17 1401   BP: 106/54   '      ROS - Pos for CC.  Patient denies current nausea, vomiting, chills, fevers, belly pain, calf pain, chest pain or SOB.  Complete remainder of ROS it otherwise neg.      PE:  Gen:   No apparent distress  Neuro:   A&Ox3, no deficits  Psych:    Answering questions appropriately for age and situation with normal affect  Head:    NCAT  Eye:    Visual scanning without deficit  Ear:    Response to auditory stimuli wnl  Lung:    Non-labored breathing on RA noted  Abd:    NTND per patient report  Lymph:    Neg for pitting/non-pitting edema BLE  Vasc:    Pulses palpable, CFT minimally delayed  Neuro:    Light touch sensation diminished distally  Derm:  Ulcer sub 5th MPJ R foot, partial thickness, 4 x 3mm, granular base after debridement.  No deep probing or localized SOI  MSK:    ROM, strength wnl without limitation, no pain on palpation noted.  Calf:    Neg for redness, swelling or tenderness      Assessment:  76 year old male with DMII, neuropathy; ulcer sub 5th MPJ R foot      Plan:  Discussed etiologies/options  1.  DMII, neuropathy  -tight glycemic control, close monitoring of feet    2.  Ulcer sub 5th MPJ R foot  -Excisional debridement was performed to the wound, partial-thickness(limited to skin breakdown), sharply debridement the wound, excising nonviable tissue to the above dimensions with a tissue nipper  -daily cares - wash/dry, abx ointment/bandaid  -DH2 shoe currently; transition back to DMII shoe/orthotic unless deterioration of wound is noted     Follow up:  Has follow up in 4 weeks; follow up immediatley with deterioration of wound      There is no  weight on file to calculate BMI.    Weight management plan: Patient was referred to their PCP to discuss a diet and exercise plan.         Enzo Alonso DPM   Podiatric Foot & Ankle Surgeon  [facility]  [phone]

## 2017-02-06 ENCOUNTER — TRANSFERRED RECORDS (OUTPATIENT)
Dept: HEALTH INFORMATION MANAGEMENT | Facility: CLINIC | Age: 77
End: 2017-02-06

## 2017-02-07 ENCOUNTER — APPOINTMENT (OUTPATIENT)
Dept: CT IMAGING | Facility: CLINIC | Age: 77
DRG: 095 | End: 2017-02-07
Attending: EMERGENCY MEDICINE
Payer: MEDICARE

## 2017-02-07 ENCOUNTER — APPOINTMENT (OUTPATIENT)
Dept: GENERAL RADIOLOGY | Facility: CLINIC | Age: 77
DRG: 095 | End: 2017-02-07
Attending: EMERGENCY MEDICINE
Payer: MEDICARE

## 2017-02-07 ENCOUNTER — HOSPITAL ENCOUNTER (INPATIENT)
Facility: CLINIC | Age: 77
LOS: 1 days | Discharge: ANOTHER HOSPITAL | DRG: 095 | End: 2017-02-09
Attending: EMERGENCY MEDICINE | Admitting: HOSPITALIST
Payer: MEDICARE

## 2017-02-07 ENCOUNTER — TRANSFERRED RECORDS (OUTPATIENT)
Dept: HEALTH INFORMATION MANAGEMENT | Facility: CLINIC | Age: 77
End: 2017-02-07

## 2017-02-07 DIAGNOSIS — E11.42 TYPE 2 DIABETES MELLITUS WITH DIABETIC POLYNEUROPATHY, WITHOUT LONG-TERM CURRENT USE OF INSULIN (H): ICD-10-CM

## 2017-02-07 DIAGNOSIS — C91.10 CLL (CHRONIC LYMPHOCYTIC LEUKEMIA) (H): ICD-10-CM
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
## 2017-02-07 DIAGNOSIS — M54.5 ACUTE MIDLINE LOW BACK PAIN, WITH SCIATICA PRESENCE UNSPECIFIED: Primary | ICD-10-CM

## 2017-02-07 DIAGNOSIS — S20.221A BACK CONTUSION, RIGHT, INITIAL ENCOUNTER: ICD-10-CM

## 2017-02-07 LAB
ALBUMIN UR-MCNC: 100 MG/DL
AMORPH CRY #/AREA URNS HPF: ABNORMAL /HPF
ANION GAP SERPL CALCULATED.3IONS-SCNC: 12 MMOL/L (ref 3–14)
APPEARANCE UR: CLEAR
BACTERIA #/AREA URNS HPF: ABNORMAL /HPF
BASOPHILS # BLD AUTO: 0 10E9/L (ref 0–0.2)
BASOPHILS NFR BLD AUTO: 0.1 %
BILIRUB UR QL STRIP: NEGATIVE
BUN SERPL-MCNC: 15 MG/DL (ref 7–30)
CALCIUM SERPL-MCNC: 8.8 MG/DL (ref 8.5–10.1)
CHLORIDE SERPL-SCNC: 99 MMOL/L (ref 94–109)
CO2 SERPL-SCNC: 25 MMOL/L (ref 20–32)
COLOR UR AUTO: YELLOW
CREAT SERPL-MCNC: 0.75 MG/DL (ref 0.66–1.25)
DIFFERENTIAL METHOD BLD: ABNORMAL
EOSINOPHIL # BLD AUTO: 0 10E9/L (ref 0–0.7)
EOSINOPHIL NFR BLD AUTO: 0.1 %
ERYTHROCYTE [DISTWIDTH] IN BLOOD BY AUTOMATED COUNT: 16.4 % (ref 10–15)
GFR SERPL CREATININE-BSD FRML MDRD: ABNORMAL ML/MIN/1.7M2
GLUCOSE BLDC GLUCOMTR-MCNC: 197 MG/DL (ref 70–99)
GLUCOSE SERPL-MCNC: 206 MG/DL (ref 70–99)
GLUCOSE UR STRIP-MCNC: 100 MG/DL
HCT VFR BLD AUTO: 28.9 % (ref 40–53)
HGB BLD-MCNC: 9.4 G/DL (ref 13.3–17.7)
HGB UR QL STRIP: NEGATIVE
IMM GRANULOCYTES # BLD: 0 10E9/L (ref 0–0.4)
IMM GRANULOCYTES NFR BLD: 0.3 %
INTERPRETATION ECG - MUSE: NORMAL
KETONES UR STRIP-MCNC: 40 MG/DL
LEUKOCYTE ESTERASE UR QL STRIP: NEGATIVE
LYMPHOCYTES # BLD AUTO: 0.7 10E9/L (ref 0.8–5.3)
LYMPHOCYTES NFR BLD AUTO: 7.7 %
MCH RBC QN AUTO: 28.1 PG (ref 26.5–33)
MCHC RBC AUTO-ENTMCNC: 32.5 G/DL (ref 31.5–36.5)
MCV RBC AUTO: 87 FL (ref 78–100)
MONOCYTES # BLD AUTO: 0.8 10E9/L (ref 0–1.3)
MONOCYTES NFR BLD AUTO: 8.3 %
NEUTROPHILS # BLD AUTO: 8.1 10E9/L (ref 1.6–8.3)
NEUTROPHILS NFR BLD AUTO: 83.5 %
NITRATE UR QL: NEGATIVE
NRBC # BLD AUTO: 0 10*3/UL
NRBC BLD AUTO-RTO: 0 /100
PH UR STRIP: 6.5 PH (ref 5–7)
PLATELET # BLD AUTO: 212 10E9/L (ref 150–450)
POTASSIUM SERPL-SCNC: 3.7 MMOL/L (ref 3.4–5.3)
RBC # BLD AUTO: 3.34 10E12/L (ref 4.4–5.9)
RBC #/AREA URNS AUTO: ABNORMAL /HPF (ref 0–2)
SODIUM SERPL-SCNC: 136 MMOL/L (ref 133–144)
SP GR UR STRIP: 1.02 (ref 1–1.03)
TROPONIN I SERPL-MCNC: NORMAL UG/L (ref 0–0.04)
URN SPEC COLLECT METH UR: ABNORMAL
UROBILINOGEN UR STRIP-ACNC: 1 EU/DL (ref 0.2–1)
WBC # BLD AUTO: 9.7 10E9/L (ref 4–11)
WBC #/AREA URNS AUTO: ABNORMAL /HPF (ref 0–2)

## 2017-02-07 PROCEDURE — A9270 NON-COVERED ITEM OR SERVICE: HCPCS | Mod: GY | Performed by: INTERNAL MEDICINE

## 2017-02-07 PROCEDURE — 99220 ZZC INITIAL OBSERVATION CARE,LEVL III: CPT | Performed by: INTERNAL MEDICINE

## 2017-02-07 PROCEDURE — 84484 ASSAY OF TROPONIN QUANT: CPT | Performed by: EMERGENCY MEDICINE

## 2017-02-07 PROCEDURE — 93005 ELECTROCARDIOGRAM TRACING: CPT

## 2017-02-07 PROCEDURE — 25000131 ZZH RX MED GY IP 250 OP 636 PS 637: Mod: GY | Performed by: INTERNAL MEDICINE

## 2017-02-07 PROCEDURE — 72192 CT PELVIS W/O DYE: CPT

## 2017-02-07 PROCEDURE — 25000132 ZZH RX MED GY IP 250 OP 250 PS 637: Mod: GY | Performed by: EMERGENCY MEDICINE

## 2017-02-07 PROCEDURE — 00000146 ZZHCL STATISTIC GLUCOSE BY METER IP

## 2017-02-07 PROCEDURE — 99285 EMERGENCY DEPT VISIT HI MDM: CPT | Mod: 25

## 2017-02-07 PROCEDURE — 72170 X-RAY EXAM OF PELVIS: CPT

## 2017-02-07 PROCEDURE — 96372 THER/PROPH/DIAG INJ SC/IM: CPT

## 2017-02-07 PROCEDURE — G0378 HOSPITAL OBSERVATION PER HR: HCPCS

## 2017-02-07 PROCEDURE — 80048 BASIC METABOLIC PNL TOTAL CA: CPT | Performed by: EMERGENCY MEDICINE

## 2017-02-07 PROCEDURE — A9270 NON-COVERED ITEM OR SERVICE: HCPCS | Mod: GY | Performed by: EMERGENCY MEDICINE

## 2017-02-07 PROCEDURE — 25000132 ZZH RX MED GY IP 250 OP 250 PS 637: Mod: GY | Performed by: INTERNAL MEDICINE

## 2017-02-07 PROCEDURE — 81001 URINALYSIS AUTO W/SCOPE: CPT | Performed by: EMERGENCY MEDICINE

## 2017-02-07 PROCEDURE — 72100 X-RAY EXAM L-S SPINE 2/3 VWS: CPT

## 2017-02-07 PROCEDURE — 85025 COMPLETE CBC W/AUTO DIFF WBC: CPT | Performed by: EMERGENCY MEDICINE

## 2017-02-07 RX ORDER — LIDOCAINE 40 MG/G
CREAM TOPICAL
Status: DISCONTINUED | OUTPATIENT
Start: 2017-02-07 | End: 2017-02-09 | Stop reason: HOSPADM

## 2017-02-07 RX ORDER — ONDANSETRON 4 MG/1
4 TABLET, ORALLY DISINTEGRATING ORAL EVERY 6 HOURS PRN
Status: DISCONTINUED | OUTPATIENT
Start: 2017-02-07 | End: 2017-02-09 | Stop reason: HOSPADM

## 2017-02-07 RX ORDER — HYDROCODONE BITARTRATE AND ACETAMINOPHEN 5; 325 MG/1; MG/1
1-2 TABLET ORAL EVERY 4 HOURS PRN
Status: DISCONTINUED | OUTPATIENT
Start: 2017-02-07 | End: 2017-02-08

## 2017-02-07 RX ORDER — LIDOCAINE 50 MG/G
1 PATCH TOPICAL DAILY PRN
Status: ON HOLD | COMMUNITY
End: 2017-03-18

## 2017-02-07 RX ORDER — DEXTROSE MONOHYDRATE 25 G/50ML
25-50 INJECTION, SOLUTION INTRAVENOUS
Status: DISCONTINUED | OUTPATIENT
Start: 2017-02-07 | End: 2017-02-07

## 2017-02-07 RX ORDER — LIRAGLUTIDE 6 MG/ML
1.2 INJECTION SUBCUTANEOUS DAILY
Status: DISCONTINUED | OUTPATIENT
Start: 2017-02-08 | End: 2017-02-08

## 2017-02-07 RX ORDER — NICOTINE POLACRILEX 4 MG
15-30 LOZENGE BUCCAL
Status: DISCONTINUED | OUTPATIENT
Start: 2017-02-07 | End: 2017-02-09 | Stop reason: HOSPADM

## 2017-02-07 RX ORDER — BISACODYL 10 MG
10 SUPPOSITORY, RECTAL RECTAL DAILY PRN
Status: DISCONTINUED | OUTPATIENT
Start: 2017-02-07 | End: 2017-02-09 | Stop reason: HOSPADM

## 2017-02-07 RX ORDER — MIDODRINE HYDROCHLORIDE 2.5 MG/1
2.5 TABLET ORAL 2 TIMES DAILY
Status: DISCONTINUED | OUTPATIENT
Start: 2017-02-07 | End: 2017-02-08

## 2017-02-07 RX ORDER — ATENOLOL 25 MG/1
25 TABLET ORAL DAILY
Status: DISCONTINUED | OUTPATIENT
Start: 2017-02-08 | End: 2017-02-09 | Stop reason: HOSPADM

## 2017-02-07 RX ORDER — NICOTINE POLACRILEX 4 MG
15-30 LOZENGE BUCCAL
Status: DISCONTINUED | OUTPATIENT
Start: 2017-02-07 | End: 2017-02-07

## 2017-02-07 RX ORDER — POLYETHYLENE GLYCOL 3350 17 G/17G
17 POWDER, FOR SOLUTION ORAL DAILY PRN
Status: DISCONTINUED | OUTPATIENT
Start: 2017-02-07 | End: 2017-02-08

## 2017-02-07 RX ORDER — DEXTROSE MONOHYDRATE 25 G/50ML
25-50 INJECTION, SOLUTION INTRAVENOUS
Status: DISCONTINUED | OUTPATIENT
Start: 2017-02-07 | End: 2017-02-09 | Stop reason: HOSPADM

## 2017-02-07 RX ORDER — LIDOCAINE 50 MG/G
1 PATCH TOPICAL DAILY PRN
Status: DISCONTINUED | OUTPATIENT
Start: 2017-02-07 | End: 2017-02-09 | Stop reason: HOSPADM

## 2017-02-07 RX ORDER — BISACODYL 5 MG
5-15 TABLET, DELAYED RELEASE (ENTERIC COATED) ORAL DAILY PRN
Status: DISCONTINUED | OUTPATIENT
Start: 2017-02-07 | End: 2017-02-09 | Stop reason: HOSPADM

## 2017-02-07 RX ORDER — POLYETHYLENE GLYCOL 3350 17 G/17G
17 POWDER, FOR SOLUTION ORAL DAILY PRN
Status: DISCONTINUED | OUTPATIENT
Start: 2017-02-07 | End: 2017-02-09 | Stop reason: HOSPADM

## 2017-02-07 RX ORDER — ACETAMINOPHEN 325 MG/1
650 TABLET ORAL EVERY 4 HOURS PRN
Status: DISCONTINUED | OUTPATIENT
Start: 2017-02-07 | End: 2017-02-09 | Stop reason: HOSPADM

## 2017-02-07 RX ORDER — ATORVASTATIN CALCIUM 40 MG/1
40 TABLET, FILM COATED ORAL DAILY
Status: DISCONTINUED | OUTPATIENT
Start: 2017-02-08 | End: 2017-02-09 | Stop reason: HOSPADM

## 2017-02-07 RX ORDER — AMOXICILLIN 250 MG
1-2 CAPSULE ORAL 2 TIMES DAILY PRN
Status: DISCONTINUED | OUTPATIENT
Start: 2017-02-07 | End: 2017-02-09 | Stop reason: HOSPADM

## 2017-02-07 RX ORDER — OXYCODONE HYDROCHLORIDE 5 MG/1
5 TABLET ORAL ONCE
Status: COMPLETED | OUTPATIENT
Start: 2017-02-07 | End: 2017-02-07

## 2017-02-07 RX ORDER — ACETAMINOPHEN 650 MG/1
650 SUPPOSITORY RECTAL EVERY 4 HOURS PRN
Status: DISCONTINUED | OUTPATIENT
Start: 2017-02-07 | End: 2017-02-09 | Stop reason: HOSPADM

## 2017-02-07 RX ORDER — ZOLPIDEM TARTRATE 5 MG/1
5 TABLET ORAL
Status: DISCONTINUED | OUTPATIENT
Start: 2017-02-07 | End: 2017-02-07

## 2017-02-07 RX ORDER — NALOXONE HYDROCHLORIDE 0.4 MG/ML
.1-.4 INJECTION, SOLUTION INTRAMUSCULAR; INTRAVENOUS; SUBCUTANEOUS
Status: DISCONTINUED | OUTPATIENT
Start: 2017-02-07 | End: 2017-02-09 | Stop reason: HOSPADM

## 2017-02-07 RX ORDER — TAMSULOSIN HYDROCHLORIDE 0.4 MG/1
0.8 CAPSULE ORAL AT BEDTIME
Status: DISCONTINUED | OUTPATIENT
Start: 2017-02-07 | End: 2017-02-09 | Stop reason: HOSPADM

## 2017-02-07 RX ORDER — ONDANSETRON 2 MG/ML
4 INJECTION INTRAMUSCULAR; INTRAVENOUS EVERY 6 HOURS PRN
Status: DISCONTINUED | OUTPATIENT
Start: 2017-02-07 | End: 2017-02-09 | Stop reason: HOSPADM

## 2017-02-07 RX ORDER — ASCORBIC ACID 500 MG
500 TABLET ORAL DAILY
Status: DISCONTINUED | OUTPATIENT
Start: 2017-02-08 | End: 2017-02-09 | Stop reason: HOSPADM

## 2017-02-07 RX ORDER — GLIPIZIDE 5 MG/1
5 TABLET ORAL
Status: DISCONTINUED | OUTPATIENT
Start: 2017-02-08 | End: 2017-02-08

## 2017-02-07 RX ADMIN — TAMSULOSIN HYDROCHLORIDE 0.8 MG: 0.4 CAPSULE ORAL at 22:36

## 2017-02-07 RX ADMIN — LIDOCAINE 1 PATCH: 50 PATCH TOPICAL at 22:29

## 2017-02-07 RX ADMIN — OXYCODONE HYDROCHLORIDE 5 MG: 5 TABLET ORAL at 16:04

## 2017-02-07 RX ADMIN — INSULIN GLARGINE 10 UNITS: 100 INJECTION, SOLUTION SUBCUTANEOUS at 22:36

## 2017-02-07 RX ADMIN — CARBIDOPA AND LEVODOPA 2.5 MG: 50; 200 TABLET, EXTENDED RELEASE ORAL at 22:36

## 2017-02-07 ASSESSMENT — ENCOUNTER SYMPTOMS: BACK PAIN: 1

## 2017-02-07 NOTE — PHARMACY-ADMISSION MEDICATION HISTORY
Admission Medication History    Admission medication history interview status for the 2/7/2017 admission is complete. See EPIC admission navigator for prior to admission medications     Medication history source reliability:Good  => Med list from home healthcare provider accompanies pt    Actions taken by pharmacist (provider contacted, etc):None     Additional medication history information not noted on PTA med list :None    Medication reconciliation/reorder completed by provider prior to medication history? No    Time spent in this activity: 15 minutes    Prior to Admission medications    Medication Sig Last Dose Taking? Auth Provider   GLIPIZIDE PO Take 5 mg by mouth 2 times daily (before meals) 2/6/2017 at Unknown time Yes Unknown, Entered By History   insulin glargine (LANTUS) 100 UNIT/ML injection Inject 10 Units Subcutaneous At Bedtime  Yes Unknown, Entered By History   lidocaine (LIDODERM) 5 % Patch Place 1 patch onto the skin daily as needed for moderate pain to back for back pain  Yes Unknown, Entered By History   METFORMIN HCL PO Take 1,000 mg by mouth 2 times daily (with meals)  Yes Unknown, Entered By History   atenolol (TENORMIN) 25 MG tablet Take 1 tablet (25 mg) by mouth daily 2/6/2017 at Unknown time Yes Patria Dee MD   midodrine (PROAMATINE) 2.5 MG tablet Take 1 tablet (2.5 mg) by mouth 2 times daily 2/7/2017 at AM Yes Patria Dee MD   traMADol (ULTRAM) 50 MG tablet Take 1 tablet (50 mg) by mouth every 6 hours as needed 2/7/2017 at AM Yes Patria Dee MD   polyethylene glycol (MIRALAX/GLYCOLAX) Packet Take 17 g by mouth daily as needed for constipation  at PRN Yes Patria Dee MD   senna-docusate (SENOKOT-S;PERICOLACE) 8.6-50 MG per tablet Take 1-2 tablets by mouth 2 times daily as needed (constipation )  at PRN Yes Patria Dee MD   bacitracin-polymyxin b (POLYSPORIN) ointment Apply topically 2 times daily Past Week at Unknown time Yes Patria Dee MD   tamsulosin  (FLOMAX) 0.4 MG 24 hr capsule Take 0.8 mg by mouth At Bedtime  2/6/2017 at HS Yes Reported, Patient   liraglutide (VICTOZA) 18 MG/3ML soln Inject 1.2 mg Subcutaneous daily  2/7/2017 at AM Yes Reported, Patient   ZOLPIDEM TARTRATE PO Take 5 mg by mouth nightly as needed  Past Month at Unknown time Yes Reported, Patient   clopidogrel (PLAVIX) 75 MG tablet Take 1 tablet (75 mg) by mouth daily 2/6/2017 at Unknown time Yes Amador Vick MD   Cholecalciferol (VITAMIN D PO) Take 1,000 Units by mouth daily.  2/6/2017 at Unknown time Yes Reported, Patient   atorvastatin (LIPITOR) 40 MG tablet Take 1 tablet by mouth daily. 2/6/2017 at Unknown time Yes Fritz Dugan MD   Ascorbic Acid (VITAMIN C PO) Take 500 mg by mouth daily. 2/6/2017 at Unknown time Yes Reported, Patient   Nitroglycerin (NITROQUICK SL) Place 0.4 mg under the tongue as needed.  at PRN Yes Reported, Patient   order for DME Equipment being ordered: DH2 Enzo Ames DPM   ASPIRIN PO Take 81 mg by mouth daily.   Reported, Patient       Moody Webster, PharmD

## 2017-02-07 NOTE — ED NOTES
Pt came in via EMS after falling at home. States he was going through his bedroom to get to the bathroom with his walker and it caught on the corner and his right leg gave out and he ended up catching himself on the bed and sitting on the floor. Had trouble getting up, needed assist of his s.o. Pt states when he went down to the bed, he felt a searing pain in his back that sometimes comes around to his abdomen and shoots down his legs. Pt has chronic back pain. Was just discharged 1/25 for dizziness and back pain issues and has home health coming to see him. Nurse today felt patient should not be at home, needs TCU placement. Pt has been laying around on bed for the last few days and unable to properly care for himself. Pt states he did okay for only a few days after discharge before he started having trouble. Hasn't been eating or drinking, states he has no taste buds.

## 2017-02-07 NOTE — PROGRESS NOTES
I was asked to see this pt for discharge planning. I was informed that this pt will likely need a TCU bed.  I called around to find a male TCU bed for today and found one at [facility].  I spoke with this pt and asked what he was planning to do re: a discharge.  He said he would like to go to rehab because it wasn't going well at home. I explained that [facility] has a male bed--I asked if it was ok for me to send his information.  He agreed.  I faxed the referral and [facility] called me back and said they could take this pt tomorrow. I explained I needed the bed today and they declined. I updated the bedside RN and MD.  This pt does now need a CT as there is a question of a hip fracture.  I updated this pt and explained that he will stay tonight and possibly go to a TCU tomorrow or when medically cleared. He agreed.

## 2017-02-07 NOTE — ED PROVIDER NOTES
History     Chief Complaint:  Mechanical Fall      HPI   Tad Manning is a 76 year old male with a history of chronic back pain, CAD, diabetes mellitus, and leukemia in remission who presents to the emergency department via EMS for evaluation following a mechanical fall. Of note, the patient was recently admitted to the hospital following a back injury and was discharged on 1/20/2017. Following this visit, the patient states that he was generally feeling improved. However, this morning, the patient was walking to the bathroom with his walker and tripped, landing on his buttocks. He denies striking his head or any loss of consciousness with this fall. Following this incident, the patient states that his chronic back pain was exacerbated and notes that he was unable to get around his home as a result of this back pain. This was concerning to him and prompted him to contact EMS to seek evaluation here in the emergency department.     Allergies:  Blood transfusion related     Medications:    glipiZIDE (GLUCOTROL) 5 MG tablet  atenolol (TENORMIN) 25 MG tablet  midodrine (PROAMATINE) 2.5 MG tablet  traMADol (ULTRAM) 50 MG tablet  senna-docusate (SENOKOT-S;PERICOLACE) 8.6-50 MG per tablet  tamsulosin (FLOMAX) 0.4 MG 24 hr capsule  liraglutide (VICTOZA) 18 MG/3ML soln  AMLODIPINE BESYLATE PO  ZOLPIDEM TARTRATE PO  clopidogrel (PLAVIX) 75 MG tablet  atorvastatin (LIPITOR) 40 MG tablet  ASPIRIN PO  Nitroglycerin (NITROQUICK SL)    Past Medical History:    coronary artery disease  diabetes mellitus  hyperlipidemia  hypertension  BPH  Noninflammatory pericardial effusion  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  Lives in [place].   Former Smoker, 2.00 ppd for 30 years. Quit date 06/19/1995.  Positive for alcohol use, 1 drink monthly.   Marital Status:  Single [1]    Review of Systems   Musculoskeletal: Positive for back pain and gait problem.   All other systems reviewed and are negative.    Physical Exam     Patient Vitals for the past 24 hrs:   BP Temp Temp src Pulse Resp SpO2 Height Weight   02/07/17 2054 159/68 mmHg 96.8  F (36  C) Oral 94 18 98 % - -   02/07/17 2030 (!) 138/109 mmHg - - - - 94 % - -   02/07/17 2015 - - - - - 95 % - -   02/07/17 2000 135/84 mmHg - - - - 97 % - -   02/07/17 1945 - - - - - 96 % - -   02/07/17 1930 128/52 mmHg - - - - 96 % - -   02/07/17 1915 - - - - - 95 % - -   02/07/17 1900 141/74 mmHg - - - - 96 % - -   02/07/17 1845 - - - - - 97 % - -   02/07/17 1830 137/85 mmHg - - 91 18 98 % - -   02/07/17 1818 (!) 144/96 mmHg - - 107 - 91 % - -   02/07/17 1815 - - - - - 97 % - -   02/07/17 1730 (!) 143/95 mmHg - - 90 18 99 % - -   02/07/17 1700 120/81 mmHg - - 99 - 97 % - -   02/07/17 1630 135/74 mmHg - - - - - - -   02/07/17 1600 (!) 150/98 mmHg - - - - - - -   02/07/17 1530 132/60 mmHg - - - - - - -   02/07/17 1514 - - - 86 - 96 % - -   02/07/17 1500 138/86 mmHg - - - - - - -   02/07/17 1452 162/90 mmHg 98.5  F (36.9  C) Oral 71 18 100 % 1.829 m (6') 81.194 kg (179 lb)       Physical Exam  Nursing note and vitals reviewed.  Constitutional:  Appears well-developed and well-nourished, comfortable.   HENT:   Head:   No evidence of facial or scalp trauma.  Nose:    Nose normal.   Mouth/Throat:  Mucosa is moist.  Eyes:    Conjunctivae are normal.      Pupils are equal, round, and reactive to light.      Right eye exhibits no discharge. Left eye exhibits no discharge.      No scleral icterus.   Cardiovascular:  Normal rate, regular rhythm.      Normal heart sounds and intact distal pulses.       No murmur heard.  Pulmonary/Chest:  Effort normal and breath sounds normal. No  respiratory distress.     No wheezes. No rales. No chest wall tenderness. No stridor.   Abdominal:   Soft. No distension and no mass. No tenderness.      No rebound and no guarding. No flank pain.  Musculoskeletal:  Normal range of motion.                 Neck supple, no midline cervical tenderness.      Tenderness over mid lower lumbar spine and right sacroiliaca joint. No pain in   hips and normal flexion and extension. Trace ankle edema bilaterally.   Neurological:   Alert and oriented to person, place, and year.      No cranial nerve deficit.      Exhibits normal muscle tone. Coordination normal.      GCS eye subscore is 4. GCS verbal subscore is 5.      GCS motor subscore is 6.      Normal sensation in feet. Normal plantar and dorsal flexion.   Skin:    Skin is warm and dry. No rash noted. No diaphoresis.      No erythema. No pallor.   Psychiatric:   Behavior is normal. Judgment and thought content normal.     Emergency Department Course   ECG:  Indication: Fall  Time: 1829  Vent. Rate 105 bpm. MS interval 158. QRS duration 128. QT/QTc 360/475. P-R-T axis 64 -47 113.  Sinus tachycardia with premature atrial complexes with aberrant conduction. Left axis deviation. Nonspecific intraventricular block. T wave abnormality, consider lateral ischemia. Abnormal ECG. No significant change compared to EKG dated 1/22/2017. Read time: 1830    Imaging:  Radiographic findings were communicated with the patient who voiced understanding of the findings.    XR Lumbar Spine, 2-3 views:   Mild degenerative changes are noted in the lumbar spine.  No evidence for acute fracture or dislocation. Vascular  calcifications. Bilateral iliac vascular stents are noted. As per radiology.     XR Pelvis, 1-2 views:   The right proximal femur appears rotated internally, and a  right femoral neck fracture cannot be excluded, although no definite  fracture line is seen. This finding could be related to patient  rotation on this exam. Please  clinically correlate. Consider CT for  further evaluation. Arterial calcification. As per radiology.     CT Pelvis without contrast:  Bilateral hip joint alignment is normal. There are no  fractures of the pelvis or proximal femurs on either side. As per radiology.     Laboratory:  CBC: WBC: 9.7, HGB: 9.4 (L), PLT: 212  BMP: Glucose 206 (H), o/w WNL (Creatinine: 0.75)    1855 Troponin: <0.015    UA with micro: glucose 100, ketone 40, protein albumin 100, few bacteria, few amorphous crystals o/w negative    Interventions:  1604 Oxycodone 5 mg PO    Emergency Department Course:  Nursing notes and vitals reviewed. I performed an exam of the patient as documented above.     1515 I consulted with Faith the  here in the emergency department, regarding the patient's history and presentation and possible placement for the patient.     The patient provided a urine sample here in the emergency department. This was sent for laboratory testing, findings above.     The patient was sent for a Lumbar Spine XR and Pelvic XR while in the emergency department, findings above.     1713 I rechecked the patient and discussed the results of his workup thus far.    The patient was sent for a CT Pelvis while in the emergency department, findings above.     Blood drawn. This was sent to the lab for further testing, results above.     EKG obtained in the ED, see results above.     1935  I consulted with Dr. Beckford of the hospitalist services. They are in agreement to accept the patient for admission.    Findings and plan explained to the Patient who consents to admission. Discussed the patient with Dr. Beckford, who will admit the patient to a observation bed for further monitoring, evaluation, and treatment.     Impression & Plan    Medical Decision Making:  Tad Manning is a 76 year old male who presents after falling again. He landed again on his low back. He has a lot of pain on the right side. XR of the pelvis and hip was  concerning for a possible hidden fracture on the right. CT was obtained, which was negative. Lumbar XR revealed no new fracture. Patient was given a tablet of oxycodone and was comfortable at rest, but in trying to get him up, the patient is not doing well at home and is having a hard time getting around with the back pain. I feel that he needs probably TCU placement. I talked to Faith before she left and she said that he would probably qualify and that we would be able to get him placed tomorrow. I have admitted to him to the hospitalist overnight for pain control and placement in TCU tomorrow. I did get some other labs and basic panel and troponin are normal. His EKG did show some frequent PACs. There was some nonspecific lateral changes, but nothing acute and he has had no chest pain recently. His glucose was 206, and CBC showed a low hemoglobin of 9.4, but was otherwise normal. He does have a history of CLL. Patient is comfortable with this plan.    Diagnosis:  1. Fall (W19.XXXA)  2. Back contusion, right (S20.221A)  3. CLL (C91.10)    Disposition:  Admitted to Dr. Beckford to observation, pain management, fall risk. TCU placement plan for tomorrow.     I, Pauline Carranza, am serving as a scribe on 2/7/2017 at 3:06 PM to personally document services performed by Niya Ledezma MD based on my observations and the provider's statements to me.     Pauline Carranza  2/7/2017    EMERGENCY DEPARTMENT        Niya Ledezma MD  02/07/17 8449

## 2017-02-07 NOTE — ED NOTES
Bed: ED29  Expected date:   Expected time:   Means of arrival:   Comments:  719  76 m fall/back pain  1432

## 2017-02-08 ENCOUNTER — APPOINTMENT (OUTPATIENT)
Dept: MRI IMAGING | Facility: CLINIC | Age: 77
DRG: 095 | End: 2017-02-08
Attending: PHYSICIAN ASSISTANT
Payer: MEDICARE

## 2017-02-08 ENCOUNTER — ANESTHESIA (OUTPATIENT)
Dept: SURGERY | Facility: CLINIC | Age: 77
DRG: 456 | End: 2017-02-08
Payer: MEDICARE

## 2017-02-08 ENCOUNTER — ANESTHESIA EVENT (OUTPATIENT)
Dept: SURGERY | Facility: CLINIC | Age: 77
DRG: 456 | End: 2017-02-08
Payer: MEDICARE

## 2017-02-08 VITALS
BODY MASS INDEX: 24.24 KG/M2 | HEART RATE: 89 BPM | OXYGEN SATURATION: 98 % | DIASTOLIC BLOOD PRESSURE: 46 MMHG | RESPIRATION RATE: 18 BRPM | SYSTOLIC BLOOD PRESSURE: 119 MMHG | TEMPERATURE: 97.8 F | WEIGHT: 179 LBS | HEIGHT: 72 IN

## 2017-02-08 PROBLEM — M46.40 DISCITIS: Status: ACTIVE | Noted: 2017-02-08

## 2017-02-08 LAB
APTT PPP: 33 SEC (ref 22–37)
GLUCOSE BLDC GLUCOMTR-MCNC: 127 MG/DL (ref 70–99)
GLUCOSE BLDC GLUCOMTR-MCNC: 130 MG/DL (ref 70–99)
GLUCOSE BLDC GLUCOMTR-MCNC: 152 MG/DL (ref 70–99)
GLUCOSE BLDC GLUCOMTR-MCNC: 176 MG/DL (ref 70–99)
GLUCOSE BLDC GLUCOMTR-MCNC: 214 MG/DL (ref 70–99)
GLUCOSE BLDC GLUCOMTR-MCNC: 258 MG/DL (ref 70–99)
INR PPP: 1.01 (ref 0.86–1.14)
RADIOLOGIST FLAGS: ABNORMAL

## 2017-02-08 PROCEDURE — 85610 PROTHROMBIN TIME: CPT | Performed by: PHYSICIAN ASSISTANT

## 2017-02-08 PROCEDURE — 96374 THER/PROPH/DIAG INJ IV PUSH: CPT

## 2017-02-08 PROCEDURE — 36415 COLL VENOUS BLD VENIPUNCTURE: CPT | Performed by: PHYSICIAN ASSISTANT

## 2017-02-08 PROCEDURE — G0378 HOSPITAL OBSERVATION PER HR: HCPCS

## 2017-02-08 PROCEDURE — 25000131 ZZH RX MED GY IP 250 OP 636 PS 637: Mod: GY | Performed by: PHYSICIAN ASSISTANT

## 2017-02-08 PROCEDURE — 99239 HOSP IP/OBS DSCHRG MGMT >30: CPT | Performed by: PHYSICIAN ASSISTANT

## 2017-02-08 PROCEDURE — 87186 SC STD MICRODIL/AGAR DIL: CPT | Performed by: PHYSICIAN ASSISTANT

## 2017-02-08 PROCEDURE — 27210794 ZZH OR GENERAL SUPPLY STERILE: Performed by: NEUROLOGICAL SURGERY

## 2017-02-08 PROCEDURE — 25000128 H RX IP 250 OP 636: Performed by: PHYSICIAN ASSISTANT

## 2017-02-08 PROCEDURE — 37000008 ZZH ANESTHESIA TECHNICAL FEE, 1ST 30 MIN: Performed by: NEUROLOGICAL SURGERY

## 2017-02-08 PROCEDURE — 00000146 ZZHCL STATISTIC GLUCOSE BY METER IP

## 2017-02-08 PROCEDURE — 96372 THER/PROPH/DIAG INJ SC/IM: CPT

## 2017-02-08 PROCEDURE — 72157 MRI CHEST SPINE W/O & W/DYE: CPT

## 2017-02-08 PROCEDURE — 85730 THROMBOPLASTIN TIME PARTIAL: CPT | Performed by: PHYSICIAN ASSISTANT

## 2017-02-08 PROCEDURE — 87077 CULTURE AEROBIC IDENTIFY: CPT | Performed by: PHYSICIAN ASSISTANT

## 2017-02-08 PROCEDURE — 87040 BLOOD CULTURE FOR BACTERIA: CPT | Performed by: PHYSICIAN ASSISTANT

## 2017-02-08 PROCEDURE — 87800 DETECT AGNT MULT DNA DIREC: CPT | Performed by: PHYSICIAN ASSISTANT

## 2017-02-08 PROCEDURE — 25000566 ZZH SEVOFLURANE, EA 15 MIN: Performed by: NEUROLOGICAL SURGERY

## 2017-02-08 PROCEDURE — 71000017 ZZH RECOVERY PHASE 1 LEVEL 3 EA ADDTL HR: Performed by: NEUROLOGICAL SURGERY

## 2017-02-08 PROCEDURE — A9270 NON-COVERED ITEM OR SERVICE: HCPCS | Mod: GY | Performed by: INTERNAL MEDICINE

## 2017-02-08 PROCEDURE — 25000132 ZZH RX MED GY IP 250 OP 250 PS 637: Mod: GY | Performed by: PHYSICIAN ASSISTANT

## 2017-02-08 PROCEDURE — 12000000 ZZH R&B MED SURG/OB

## 2017-02-08 PROCEDURE — 36000066 ZZH SURGERY LEVEL 4 W FLUORO 1ST 30 MIN - UMMC: Performed by: NEUROLOGICAL SURGERY

## 2017-02-08 PROCEDURE — 25500064 ZZH RX 255 OP 636: Performed by: INTERNAL MEDICINE

## 2017-02-08 PROCEDURE — 25000132 ZZH RX MED GY IP 250 OP 250 PS 637: Mod: GY | Performed by: INTERNAL MEDICINE

## 2017-02-08 PROCEDURE — A9270 NON-COVERED ITEM OR SERVICE: HCPCS | Mod: GY | Performed by: PHYSICIAN ASSISTANT

## 2017-02-08 PROCEDURE — A9585 GADOBUTROL INJECTION: HCPCS | Performed by: INTERNAL MEDICINE

## 2017-02-08 PROCEDURE — 37000009 ZZH ANESTHESIA TECHNICAL FEE, EACH ADDTL 15 MIN: Performed by: NEUROLOGICAL SURGERY

## 2017-02-08 PROCEDURE — 71000016 ZZH RECOVERY PHASE 1 LEVEL 3 FIRST HR: Performed by: NEUROLOGICAL SURGERY

## 2017-02-08 PROCEDURE — 25000125 ZZHC RX 250: Performed by: INTERNAL MEDICINE

## 2017-02-08 PROCEDURE — 36000064 ZZH SURGERY LEVEL 4 EA 15 ADDTL MIN - UMMC: Performed by: NEUROLOGICAL SURGERY

## 2017-02-08 PROCEDURE — 25000131 ZZH RX MED GY IP 250 OP 636 PS 637: Mod: GY | Performed by: INTERNAL MEDICINE

## 2017-02-08 RX ORDER — PIPERACILLIN SODIUM, TAZOBACTAM SODIUM 3; .375 G/15ML; G/15ML
3.38 INJECTION, POWDER, LYOPHILIZED, FOR SOLUTION INTRAVENOUS EVERY 6 HOURS
Status: DISCONTINUED | OUTPATIENT
Start: 2017-02-08 | End: 2017-02-08

## 2017-02-08 RX ORDER — OXYCODONE HYDROCHLORIDE 5 MG/1
5-10 TABLET ORAL
Status: DISCONTINUED | OUTPATIENT
Start: 2017-02-08 | End: 2017-02-09 | Stop reason: HOSPADM

## 2017-02-08 RX ORDER — GADOBUTROL 604.72 MG/ML
8 INJECTION INTRAVENOUS ONCE
Status: COMPLETED | OUTPATIENT
Start: 2017-02-08 | End: 2017-02-08

## 2017-02-08 RX ORDER — TROLAMINE SALICYLATE 10 G/100G
CREAM TOPICAL 4 TIMES DAILY PRN
Status: DISCONTINUED | OUTPATIENT
Start: 2017-02-08 | End: 2017-02-09 | Stop reason: HOSPADM

## 2017-02-08 RX ORDER — ACETAMINOPHEN 500 MG
1000 TABLET ORAL 3 TIMES DAILY
Status: DISCONTINUED | OUTPATIENT
Start: 2017-02-08 | End: 2017-02-09 | Stop reason: HOSPADM

## 2017-02-08 RX ORDER — OXYCODONE HYDROCHLORIDE 5 MG/1
5-10 TABLET ORAL
Refills: 0 | Status: ON HOLD | DISCHARGE
Start: 2017-02-08 | End: 2017-02-15

## 2017-02-08 RX ORDER — HYDROMORPHONE HYDROCHLORIDE 1 MG/ML
.3-.5 INJECTION, SOLUTION INTRAMUSCULAR; INTRAVENOUS; SUBCUTANEOUS
Status: DISCONTINUED | OUTPATIENT
Start: 2017-02-08 | End: 2017-02-09 | Stop reason: HOSPADM

## 2017-02-08 RX ADMIN — GADOBUTROL 8 ML: 604.72 INJECTION INTRAVENOUS at 18:06

## 2017-02-08 RX ADMIN — OXYCODONE HYDROCHLORIDE 10 MG: 5 TABLET ORAL at 19:58

## 2017-02-08 RX ADMIN — ACETAMINOPHEN 650 MG: 325 TABLET, FILM COATED ORAL at 14:07

## 2017-02-08 RX ADMIN — GLIPIZIDE 5 MG: 5 TABLET ORAL at 08:44

## 2017-02-08 RX ADMIN — LIRAGLUTIDE 1.2 MG: 6 INJECTION SUBCUTANEOUS at 08:55

## 2017-02-08 RX ADMIN — OXYCODONE HYDROCHLORIDE AND ACETAMINOPHEN 500 MG: 500 TABLET ORAL at 07:56

## 2017-02-08 RX ADMIN — INSULIN GLARGINE 5 UNITS: 100 INJECTION, SOLUTION SUBCUTANEOUS at 22:41

## 2017-02-08 RX ADMIN — TAMSULOSIN HYDROCHLORIDE 0.8 MG: 0.4 CAPSULE ORAL at 21:52

## 2017-02-08 RX ADMIN — CARBIDOPA AND LEVODOPA 2.5 MG: 50; 200 TABLET, EXTENDED RELEASE ORAL at 07:56

## 2017-02-08 RX ADMIN — HYDROCODONE BITARTRATE AND ACETAMINOPHEN 2 TABLET: 5; 325 TABLET ORAL at 10:02

## 2017-02-08 RX ADMIN — INSULIN ASPART 1 UNITS: 100 INJECTION, SOLUTION INTRAVENOUS; SUBCUTANEOUS at 08:44

## 2017-02-08 RX ADMIN — ATENOLOL 25 MG: 25 TABLET ORAL at 07:57

## 2017-02-08 RX ADMIN — OXYCODONE HYDROCHLORIDE 10 MG: 5 TABLET ORAL at 14:07

## 2017-02-08 RX ADMIN — HYDROCODONE BITARTRATE AND ACETAMINOPHEN 1 TABLET: 5; 325 TABLET ORAL at 02:12

## 2017-02-08 RX ADMIN — GLIPIZIDE 5 MG: 5 TABLET ORAL at 19:24

## 2017-02-08 RX ADMIN — HYDROMORPHONE HYDROCHLORIDE 0.5 MG: 1 INJECTION, SOLUTION INTRAMUSCULAR; INTRAVENOUS; SUBCUTANEOUS at 22:54

## 2017-02-08 RX ADMIN — ACETAMINOPHEN 1000 MG: 500 TABLET ORAL at 20:00

## 2017-02-08 RX ADMIN — METFORMIN HYDROCHLORIDE 1000 MG: 500 TABLET, FILM COATED ORAL at 08:44

## 2017-02-08 RX ADMIN — VITAMIN D, TAB 1000IU (100/BT) 1000 UNITS: 25 TAB at 07:56

## 2017-02-08 RX ADMIN — ATORVASTATIN CALCIUM 40 MG: 40 TABLET, FILM COATED ORAL at 07:57

## 2017-02-08 RX ADMIN — TRAMADOL HYDROCHLORIDE 25 MG: 50 TABLET ORAL at 07:57

## 2017-02-08 RX ADMIN — HYDROMORPHONE HYDROCHLORIDE 1 MG: 1 INJECTION, SOLUTION INTRAMUSCULAR; INTRAVENOUS; SUBCUTANEOUS at 16:37

## 2017-02-08 ASSESSMENT — COPD QUESTIONNAIRES: COPD: 1

## 2017-02-08 ASSESSMENT — PAIN DESCRIPTION - DESCRIPTORS
DESCRIPTORS: ACHING
DESCRIPTORS: ACHING

## 2017-02-08 NOTE — PROGRESS NOTES
[facility]    Hospitalist Progress Note    Date of Service (when I saw the patient): 02/08/2017    Assessment and Plan  Tad Manning is a pleasant 76 year old gentleman with history of chronic back pain, CAD, diabetes mellitus, and leukemia in remission who presented to the emergency department via EMS for evaluation following a mechanical fall at home. Of note, Harmeet was recently admitted to the hospital following a back injury and ARF and discharged on 1/20/2017 back to his home.  Following that admission, he was generally feeling improved; however, over recent days he has worsening back pain and LE weakness that ultimately resulted in a fall.      Right lower back pain: Has been ongoing since mid-December after a fall. Had work-up through [facility] including lumbar films that were negative in Dec. CT of Pelvis in ED 2/27 also negative.  - Etiology on clear. Does have some radicular symptoms but c/o of more shooting pain down the left leg and weakness is on the right. Also new progressive weakness of bilateral LE R> L  - MRI of lumbar spine to rule out nerve impingement.   - Pain control with scheduled tylenol, oxycodone, and PRN lidocaine patch. IV dilaudid also available.  - PT eval when able. Currently requiring lift 2/2 to pain and weakness  - Consider neurosurgery consult vs ortho pending results of MRI    Bilateral LE weakness, R>L: Has been ongoing but sudden worsening per patient report. Has chronic numbness due to DM neuropathy which is unchanged. Patient is otherwise non-focal so doubt CVA  - Lumbar MRI as above  - Neurology consult if MRI without explanation for symptoms  - PT/OT when able    DM Type II, A1C 7.0; PTA regimen includes glipizide 5 mg bid, metformin 1000 mg bid, Lantus 10 units q hs and Victoza 1.2 mg daily  - Metformin was d/c'd last hospitalization in 1/17 due to ARF (Cr as high as 4). It appears patient resumed this medication. Cr stable but will hold metformin  -  "Continue PTA Victoza, glipizide and Lantus  - SSI    Essential hypertension with h/o orthostatic hypotension: During most recent hospitalization patient had recurrent c/o dizziness and had issues with orthostatic hypotension. He was placed on midodrine as well as compression stockings  -Will hold midodrine  -Continue PTA atenolol  - Reassess signs of orthostatic changes when ambulatory    Cardiac and coronary artery disease and hypertension and hyperlipidemia: Patient with history of triple bypass in 1995 per report, as well as hypertension, hyperlipidemia, peripheral vascular disease, coronary artery disease.   - ACE, Lasix and Norvasc were d/c'd last hospitalization in the setting of ARF and orthostatic hypotension  - Continue BB, statin ASA and Plavix    PVD s/p LLE bypass and bilateral LE stents  - DP pulses palpated. No signs of ischemia  - Continue ASA, statin and Plavix      H/o CLL: Patient with history of chronic lymphocytic leukemia in remission per report, now with anemia and thrombocytopenia. Followed by Dr England as outpatient.  - Was evaluated by oncology last hospitalization due to anemia and thrombocyopenia. No further work-up was recommended  - Hgb stable  - Platelets improved.     DVT Prophylaxis: Pneumatic Compression Devices  Code Status: Full Code    Disposition: Pending work-up including MRI and appropriate consults pending results. Suspect 1-2 additional days.     [place] Lisa Joseph    Interval History  Has significant weakness of RLE. Unable to lift off of bed. Has been \"weak for a while, but not this weak\". Denies new injury. C/o significant pain in right lumbar back. No urinary symptoms. Denies dizziness.     -Data reviewed today: I reviewed all new labs and imaging results over the last 24 hours. I personally reviewed no images or EKG's today.    Physical Exam  Temp: 98  F (36.7  C) Temp src: Oral BP: 143/69 mmHg Pulse: 85   Resp: 16 SpO2: 98 % O2 Device: None (Room air)    Filed Vitals: "    02/07/17 1452   Weight: 81.194 kg (179 lb)     Vital Signs with Ranges  Temp:  [96.8  F (36  C)-99.2  F (37.3  C)] 98  F (36.7  C)  Pulse:  [] 85  Resp:  [16-18] 16  BP: (120-162)/() 143/69 mmHg  SpO2:  [91 %-100 %] 98 %       Constitutional: Alert, resting comfortably in NAD  Respiratory: Normal effort, symmetric expansion, no crackles or wheezing  Cardiovascular: RRR no murmurs   GI: Non distended, normal bowels sounds, no tenderness or guarding  MSK: LE without edema. Dorsalis pedis pulse palpated bilaterally.Unable to examine back due to severe pain with movement  Skin/Integumen: Clear  Neuro: CN II-XII intact, strength in UE 5/5. Cerebellar function intact with finger to nose. Neg pronator drift. Strength dorsal/plantar flexion 4/5 (R) and 5/5 (L). Strength with hip flexion 2/5 (R) and 3/5 (L)  Psych:  Alert and oriented x 3. Normal affect      Medications       ascorbic acid (VITAMIN C) tablet 500 mg  500 mg Oral Daily     atenolol  25 mg Oral Daily     atorvastatin  40 mg Oral Daily     cholecalciferol  1,000 Units Oral Daily     glipiZIDE (GLUCOTROL) tablet 5 mg  5 mg Oral BID AC     insulin glargine  10 Units Subcutaneous At Bedtime     liraglutide  1.2 mg Subcutaneous Daily     metFORMIN (GLUCOPHAGE) tablet 1,000 mg  1,000 mg Oral BID w/meals     midodrine  2.5 mg Oral BID     tamsulosin  0.8 mg Oral At Bedtime     sodium chloride (PF)  3 mL Intracatheter Q8H     insulin aspart  1-3 Units Subcutaneous TID AC     insulin aspart  1-3 Units Subcutaneous At Bedtime     lidocaine   Transdermal Q8H       Data    Recent Labs  Lab 02/07/17  1855   WBC 9.7   HGB 9.4*   MCV 87         POTASSIUM 3.7   CHLORIDE 99   CO2 25   BUN 15   CR 0.75   ANIONGAP 12   JUSTINO 8.8   *   TROPI <0.015The 99th percentile for upper reference range is 0.045 ug/L.  Troponin values in the range of 0.045 - 0.120 ug/L may be associated with risks of adverse clinical events.       Recent Results (from the  past 24 hour(s))   Pelvis XR, 1-2 views    Narrative    PELVIS ONE VIEW   2/7/2017 4:30 PM     HISTORY: Fall. Pain.    COMPARISON: None.      Impression    IMPRESSION: The right proximal femur appears rotated internally, and a  right femoral neck fracture cannot be excluded, although no definite  fracture line is seen. This finding could be related to patient  rotation on this exam. Please clinically correlate. Consider CT for  further evaluation. Arterial calcification.    HUNTER GALLARDO MD   Lumbar spine XR, 2-3 views    Narrative    LUMBAR SPINE TWO VIEWS   2/7/2017 4:30 PM     HISTORY: Fall. Pain.    COMPARISON: None.      Impression    IMPRESSION: Mild degenerative changes are noted in the lumbar spine.  No evidence for acute fracture or dislocation. Vascular  calcifications. Bilateral iliac vascular stents are noted.    HUNTER GALLARDO MD   CT Pelvis w/o Contrast    Narrative    CT PELVIS WITHOUT CONTRAST 2/7/2017 6:13 PM     COMPARISON: Two view right hip 2/7/2017.    HISTORY: Fall, pain.    TECHNIQUE: Thin-section axial non-contrast CT images of the pelvis and  right hip were acquired. Multiplanar reconstructions were created.      Impression    IMPRESSION: Bilateral hip joint alignment is normal. There are no  fractures of the pelvis or proximal femurs on either side.      Radiation dose for this scan was reduced using automated exposure  control, adjustment of the mA and/or kV according to patient size, or  iterative reconstruction technique    THUY WALTON MD

## 2017-02-08 NOTE — PROGRESS NOTES
List all goals to be met before discharge home   - Diagnostic tests and consults completed and resulted - not met  - Vital signs normal or at patient baseline -  met  - Tolerating oral intake to maintain hydration - met   - Adequate pain control on oral analgesia - not met  - Tolerating oral antibiotics or has plans for home infusion setup - NA  - Infection is improving - NA  - Return to baseline functional status - not met  - Dyspnea improved and oxygen saturations greater than 88% on room air or prior home oxygen levels - met   - Safe disposition plan has been identified - not met

## 2017-02-08 NOTE — PROGRESS NOTES
Goal: Goal Outcome Summary  Outcome: Improving  List all goals to be met before discharge home   - Diagnostic tests and consults completed and resulted - not met  - Vital signs normal or at patient baseline -  met  - Tolerating oral intake to maintain hydration - met   - Adequate pain control on oral analgesia - partially met  - Tolerating oral antibiotics or has plans for home infusion setup - NA  - Infection is improving - NA  - Return to baseline functional status - Not met,  - Dyspnea improved and oxygen saturations greater than 88% on room air or prior home oxygen levels - met   - Safe disposition plan has been identified - not met,

## 2017-02-08 NOTE — PLAN OF CARE
Problem: Goal Outcome Summary  Goal: Goal Outcome Summary  Outcome: Improving  List all goals to be met before discharge home   - Diagnostic tests and consults completed and resulted - not met  - Vital signs normal or at patient baseline -  met  - Tolerating oral intake to maintain hydration - met   - Adequate pain control on oral analgesia - partially met  - Tolerating oral antibiotics or has plans for home infusion setup - NA  - Infection is improving - NA  - Return to baseline functional status - Not met, pt unable to ambulate safely   - Dyspnea improved and oxygen saturations greater than 88% on room air or prior home oxygen levels - met   - Safe disposition plan has been identified - not met, TCU?  - No Additional Diagnosis Specific Goals     Pt admitted to OBS unit from ED at 2045. A&O x4. C/o low right back pain that is tolerable with lidocaine patch and hot packs. Has RLE weakness noted on assessment. Blood sugar 197 at 2100, only covered with lantus. Did not get up OOB this shift, repositioning with pillows in bed, reddened/blanchable skin on buttocks. Pt is able to use call light and make needs known.   Nurse to notify provider when observation goals have been met and patient is ready for discharge.

## 2017-02-08 NOTE — H&P
"[facility]    History and Physical  Hospitalist       Date of Admission:  2/7/2017  Date of Service (when I saw the patient): 2/7/17    Assessment and Plan  Tad Manning is a pleasant 76 year old gentleman who presents from the ED after a fall at home; current problems include:    1.  Falls, recurrent.  Fell again at home today; mechanism seems possibly different than previous recent falls, however.    - falls precautions, neuro. Checks, PT/OT eval's    2.  Right leg weakness, affecting multiple muscle groups; unclear cause; may be acute or subacute.  DDx could include lumbar spinal stenosis or disc herniation, lumbosacral plexopathy or other diabetic neuropathy.  Doubt CVA.  - PT/OT eval's; falls precautions  - Neurology eval. 2/8  - consider MRI of LS spine to help assess; will wait for Neurology input in a.m.     3. Chronic bilateral foot neuropathy, based on Sx of chronic numbness.  No recent changes, but may be contributing to recurrent falls.  - consider trial of gabapentin    4.  DM Type II; sugar variable so far.  - POC glucose monitoring; mod-consistent CHO diet  - resume PTA glipizide, metformin, Victoza, Lantus insulin  - add low-intensity SS insulin    5.  Right lower back pain with tenderness just above right iliac crest; unclear cause.  - resume PTA lidocaine patch and PRN tramadol; add PRN medicated rub and try heating pad  - consider Ortho. Consult if no improvement overnight    6.  Essential hypertension; pressures variable.  - resume PTA atenolol; note: also on midodrine; may need to decrease/discontinue this prior to discharge    7.  Right plantar foot wound - present 6 months, per Harmeet.  Does not appear infected.  - follow up with Podiatry after discharge    8.  Insomnia, chronic.  - resume (but at lower dose) PTA PRN zolpidem    9.  Weight loss; Harmeet says he has lost \"45 # in 45 days\"; not sure if this is accurate.  - RD to evaluate further      DVT Prophylaxis: " "Pneumatic Compression Devices; hold on chemoprophylaxis for now with multiple recent falls, possible need for diagnostic studies in a.m.  Code Status: Full Code    Disposition: Expected discharge in 1-2 days pending evaluation by Neurology, PT/OT.      AIDE Beckford MD, FACP     Hospitalist    Primary Care Physician  Dr. Gene Guevara    Chief Complaint  Fell at home earlier today.    History was obtained from the ED provider, HUDSON and Harmeet.    History of Present Illness   \"Harmeet\" is a pleasant 76 year old gentleman with history of chronic back pain, CAD, diabetes mellitus, and leukemia in remission who presented to the emergency department via EMS for evaluation following a mechanical fall at home. Of note, Harmeet was recently admitted to the hospital following a back injury and discharged on 1/20/2017.  Following that admission, he was generally feeling improved.  This morning, However, Harmeet was walking to the bathroom with his walker and tripped, landing on his buttocks. He denies striking his head or losing consciousness before falling.  His right lower back has been more painful since his fall; he has been unable to get around his home as a result of this back pain.  This was concerning to him and prompted him to contact EMS to seek evaluation in the emergency department.      Past Medical History   I have reviewed this patient's medical history and updated it with pertinent information if needed.   Past Medical History   Diagnosis Date     Coronary artery disease      triple bypass 1995     Diabetes mellitus (H)      History of blood transfusion      Hypertension      Malignant neoplasm (H)      CLL     Hyperlipidaemia      Sebaceous cyst      BMI 30.0-30.9,adult      BPH (benign prostatic hypertrophy)      Cellulitis      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)      Noninflammatory pericardial effusion      Osteomyelitis of left foot (H)      PVD (peripheral vascular disease) " (H)      Diabetic polyneuropathy (H)      ASCVD (arteriosclerotic cardiovascular disease)      Arthritis        Past Surgical History  I have reviewed this patient's surgical history and updated it with pertinent information if needed.  Past Surgical History   Procedure Laterality Date     Tonsillectomy       Bone marrow biopsy, bone specimen, needle/trocar  10/19/2012     Procedure: BIOPSY BONE MARROW;  BONE MARROW BIOPSY  (CONSCIOUS SED);  Surgeon: Ramon Quesada MD;  Location:  GI     Vascular surgery       ptcr legs     Bypass graft femoropopliteal  1/10/2014     Procedure: BYPASS GRAFT FEMOROPOPLITEAL;  Left above knee popliteal to  Below knee popliteal bypass using transverse saphenous vein graft. Left 2nd Toe amputation;  Surgeon: Amador Vick MD;  Location:  OR     Amputate toe(s)  1/10/2014     Procedure: AMPUTATE TOE(S);;  Surgeon: Amador Vick MD;  Location:  OR     Graft vein from extremity (location)  1/10/2014     Procedure: GRAFT VEIN FROM EXTREMITY (LOCATION);;  Surgeon: Amador Vick MD;  Location:  OR     Cardiac surgery       angioplasty with stent, triple bypass     Excise cyst generic (location) N/A 2/1/2016     Procedure: EXCISE CYST GENERIC (LOCATION);  Surgeon: Amador Vick MD;  Location:  SD       Prior to Admission Medications  Prior to Admission Medications   Prescriptions Last Dose Informant Patient Reported? Taking?   ASPIRIN PO  Self Yes No   Sig: Take 81 mg by mouth daily.   Ascorbic Acid (VITAMIN C PO) 2/6/2017 at Unknown time Self Yes Yes   Sig: Take 500 mg by mouth daily.   Cholecalciferol (VITAMIN D PO) 2/6/2017 at Unknown time Self Yes Yes   Sig: Take 1,000 Units by mouth daily.    GLIPIZIDE PO 2/6/2017 at Unknown time Self Yes Yes   Sig: Take 5 mg by mouth 2 times daily (before meals)   METFORMIN HCL PO  Self Yes Yes   Sig: Take 1,000 mg by mouth 2 times daily (with meals)   Nitroglycerin (NITROQUICK SL)  at PRN Self Yes Yes   Sig: Place  0.4 mg under the tongue as needed.   ZOLPIDEM TARTRATE PO Past Month at Unknown time Self Yes Yes   Sig: Take 5 mg by mouth nightly as needed    atenolol (TENORMIN) 25 MG tablet 2/6/2017 at Unknown time Self No Yes   Sig: Take 1 tablet (25 mg) by mouth daily   atorvastatin (LIPITOR) 40 MG tablet 2/6/2017 at Unknown time Self No Yes   Sig: Take 1 tablet by mouth daily.   bacitracin-polymyxin b (POLYSPORIN) ointment Past Week at Unknown time Self No Yes   Sig: Apply topically 2 times daily   clopidogrel (PLAVIX) 75 MG tablet 2/6/2017 at Unknown time Self No Yes   Sig: Take 1 tablet (75 mg) by mouth daily   insulin glargine (LANTUS) 100 UNIT/ML injection  Self Yes Yes   Sig: Inject 10 Units Subcutaneous At Bedtime   lidocaine (LIDODERM) 5 % Patch  Self Yes Yes   Sig: Place 1 patch onto the skin daily as needed for moderate pain to back for back pain   liraglutide (VICTOZA) 18 MG/3ML soln 2/7/2017 at AM Self Yes Yes   Sig: Inject 1.2 mg Subcutaneous daily    midodrine (PROAMATINE) 2.5 MG tablet 2/7/2017 at AM Self No Yes   Sig: Take 1 tablet (2.5 mg) by mouth 2 times daily   order for DME  Self No No   Sig: Equipment being ordered: DH2 shoe   polyethylene glycol (MIRALAX/GLYCOLAX) Packet  at PRN Self No Yes   Sig: Take 17 g by mouth daily as needed for constipation   senna-docusate (SENOKOT-S;PERICOLACE) 8.6-50 MG per tablet  at PRN Self No Yes   Sig: Take 1-2 tablets by mouth 2 times daily as needed (constipation )   tamsulosin (FLOMAX) 0.4 MG 24 hr capsule 2/6/2017 at HS Self Yes Yes   Sig: Take 0.8 mg by mouth At Bedtime    traMADol (ULTRAM) 50 MG tablet 2/7/2017 at AM Self No Yes   Sig: Take 1 tablet (50 mg) by mouth every 6 hours as needed      Facility-Administered Medications: None     Allergies  Allergies   Allergen Reactions     Blood Transfusion Related (Informational Only) Other (See Comments)     Patient has a history of a clinically significant antibody against RBC antigens.  A delay in compatible RBCs may  "occur.        Social History  I have reviewed this patient's social history and updated it with pertinent information if needed.  Tobacco: quit 22 years ago; previous 2.5 ppwk cigs x 20+ years.  No Et0H use x 6 months; previously infrequent.  Lives with his S.O.  Works as an [employer] . For a [employer] company.  Completed a B.A. In [employer] [facility] [place].    Family History  I have reviewed this patient's family history and updated it with pertinent information if needed.   Family History   Problem Relation Age of Onset     CANCER Mother      leukemia   * Father:  at 79 of ? Heart disease.  * has 1 brother who is 76 and otherwise healthy    Review of Systems  The 10 point Review of Systems is negative other than noted in the HPI or here. Harmeet believes his R. Leg became acutely more weak over the past several days, and contributed to his fall today; says \"my right leg just went out\".  No recent F/C/SOB, no chest pain.  Appetite fair.  No N/v/constipation or diarrhea.  No recent rash or other skin changes.    Physical Exam  Temp: 99.2  F (37.3  C) Temp src: Oral BP: 136/63 mmHg Pulse: 93   Resp: 16 SpO2: 98 % O2 Device: None (Room air)    Vital Signs with Ranges  Temp:  [96.8  F (36  C)-99.2  F (37.3  C)] 99.2  F (37.3  C)  Pulse:  [] 93  Resp:  [16-18] 16  BP: (120-162)/() 136/63 mmHg  SpO2:  [91 %-100 %] 98 %  179 lbs 0 oz    Constitutional: NAD; lying in bed  Eyes: sclerae clear; PERRLA, EOMI  HEENT: AT/NC; nares/oropharynx moist  Neck: good ROM, supple. No LA, thyromegaly or carotid bruits  Respiratory: good a/e bilaterally; no wheezing or rhonchi.  Back: tender over R. Lower lumber/CVA area/iliac crest.  No ecchymosis noted  Cardiovascular: S1, S2 noted; RRR without M/R/G  GI: abd. Flat; + bowel sounds; NT/ND; no HSM  Genitourinary: deferred  Skin: mild erythema gluteal fold; no skin breakdown noted  Musculoskeletal: muscle atrophy of bilateral quads/calves; no major joint abnormalities " noted  Neurologic: awake, talkative, tangential at times; follows directions well; Oriented to person, place, year; was close on day/date.      Data  Data reviewed today:  I personally reviewed the Xray and CT image(s) showing results listed below..    Recent Labs  Lab 02/07/17  1855   WBC 9.7   HGB 9.4*   MCV 87         POTASSIUM 3.7   CHLORIDE 99   CO2 25   BUN 15   CR 0.75   ANIONGAP 12   JUSTINO 8.8   *   TROPI <0.015The 99th percentile for upper reference range is 0.045 ug/L.  Troponin values in the range of 0.045 - 0.120 ug/L may be associated with risks of adverse clinical events.       Recent Results (from the past 24 hour(s))   Pelvis XR, 1-2 views    Narrative    PELVIS ONE VIEW   2/7/2017 4:30 PM     HISTORY: Fall. Pain.    COMPARISON: None.      Impression    IMPRESSION: The right proximal femur appears rotated internally, and a  right femoral neck fracture cannot be excluded, although no definite  fracture line is seen. This finding could be related to patient  rotation on this exam. Please clinically correlate. Consider CT for  further evaluation. Arterial calcification.    HUNTER GALLARDO MD   Lumbar spine XR, 2-3 views    Narrative    LUMBAR SPINE TWO VIEWS   2/7/2017 4:30 PM     HISTORY: Fall. Pain.    COMPARISON: None.      Impression    IMPRESSION: Mild degenerative changes are noted in the lumbar spine.  No evidence for acute fracture or dislocation. Vascular  calcifications. Bilateral iliac vascular stents are noted.    HUNTER GALLARDO MD   CT Pelvis w/o Contrast    Narrative    CT PELVIS WITHOUT CONTRAST 2/7/2017 6:13 PM     COMPARISON: Two view right hip 2/7/2017.    HISTORY: Fall, pain.    TECHNIQUE: Thin-section axial non-contrast CT images of the pelvis and  right hip were acquired. Multiplanar reconstructions were created.      Impression    IMPRESSION: Bilateral hip joint alignment is normal. There are no  fractures of the pelvis or proximal femurs on either  side.      Radiation dose for this scan was reduced using automated exposure  control, adjustment of the mA and/or kV according to patient size, or  iterative reconstruction technique    THUY WALTON MD

## 2017-02-08 NOTE — ED NOTES
[facility]  ED Nurse Handoff Report    ED Chief complaint: Fall and Back Pain      ED Diagnosis:   Final diagnoses:   Fall       Code Status: Full Code    Allergies:   Allergies   Allergen Reactions     Blood Transfusion Related (Informational Only) Other (See Comments)     Patient has a history of a clinically significant antibody against RBC antigens.  A delay in compatible RBCs may occur.        Activity level:  Stand with Assist     Needed?: No    Isolation: No  Infection: Not Applicable    Bariatric?: No      Vital Signs:   Filed Vitals:    02/07/17 1845 02/07/17 1900 02/07/17 1915 02/07/17 1930   BP:  141/74  128/52   Pulse:       Temp:       TempSrc:       Resp:       Height:       Weight:       SpO2: 97% 96% 95% 96%       Cardiac Rhythm: ,        Pain level: 0-10 Pain Scale: 6    Is this patient confused?: No    Patient Report: Initial Complaint: the patient was recently admitted to the hospital following a back injury and was discharged on 1/20/2017. Following this visit, the patient states that he was generally feeling improved. However, this morning, the patient was walking to the bathroom with his walker and tripped, landing on his buttocks. He denies striking his head or any loss of consciousness with this fall. Following this incident, the patient states that his chronic back pain was exacerbated and notes that he was unable to get around his home as a result of this back pain. This was concerning to him and prompted him to contact EMS to seek evaluation here in the emergency department.     Focused Assessment: fall, back pain    Tests Performed: see epic  Abnormal Results: see epic  Treatments provided: see epic    Family Comments: lives with girlfriend    OBS brochure/video discussed/provided to patient: Yes    ED Medications:   Medications   oxyCODONE (ROXICODONE) IR tablet 5 mg (5 mg Oral Given 2/7/17 4484)       Drips infusing?:  No      ED NURSE PHONE NUMBER:  [phone]

## 2017-02-08 NOTE — ED NOTES
Pt states his pain is bad after the CT. States nothing helps with having to lay flat. Continues to try using the urinal frequently.

## 2017-02-08 NOTE — PLAN OF CARE
Problem: Goal Outcome Summary  Goal: Goal Outcome Summary  Outcome: Improving  List all goals to be met before discharge home   - Diagnostic tests and consults completed and resulted - not met  - Vital signs normal or at patient baseline -  met  - Tolerating oral intake to maintain hydration - met   - Adequate pain control on oral analgesia - partially met  - Tolerating oral antibiotics or has plans for home infusion setup - NA  - Infection is improving - NA  - Return to baseline functional status - Not met,  - Dyspnea improved and oxygen saturations greater than 88% on room air or prior home oxygen levels - met   - Safe disposition plan has been identified - not met,

## 2017-02-08 NOTE — PHARMACY-CONSULT NOTE
Fall Risk Consult with Recommendations    Home meds evaluated for increasing risk of falls in this patient.  At this time, the patient is receiving the following mediations that may increase the patient's risk for falls atenolol, nitroglycerin SL, tamsulosin (bp risk), glipizide, lantus, liraglutide, metformin (low blood sugar risk), tramadol, zolpidem (CNS sedation risk).      CNS sedation: Recommend discontinuing zolpidem and using trazodone. Please consider reducing tramadol to 25 mg dose or changing to acetaminophen.   BP risk: tamsulosin, while a uroselective alpha 1 adrenergic blocker, may still cause orthostatic hypotension especially the first 8 weeks of therapy. Please consider instead using a 5 alpha reductase inhibitor like finasteride or dutasteride. A lower dose of tamsulosin could also be tried. Atenolol and nitroglycerin may cause low blood pressure.  Hypoglycemia risk: He is on multiple medications at home that lower blood sugar (listed above). Bgms are high here. I do not know if he is eating well at home and/or experiencing low blood sugar at home.

## 2017-02-08 NOTE — CONSULTS
"CLINICAL NUTRITION SERVICES  -  ASSESSMENT NOTE      Recommendations Ordered by Registered Dietitian (RD):   Change to High Consistent CHO (7313-6791 kcals per day)  8 oz strawberry Glucerna BID btw meals   Malnutrition:  Severe malnutrition  In Context of:  Acute illness or injury  Chronic illness or disease        REASON FOR ASSESSMENT  Tad Manning is a 76 year old male seen by Registered Dietitian for Provider Order - Eval overall nutritional status, ability to comply with DB diet, reported weight loss 45 lbs.      NUTRITION HISTORY  - Information obtained from patient and EPIC records.  - Patient is on a regular diet at home.  - Typical food/fluid intake is poor-fair at best.  - Diet history:  Usually pt and significant other share the shopping and cooking at home.  Two months ago, pt started having difficulty getting around and had to use a walker when going out.  He hasn't been eating very well as of late due to poor appetite, taste changes, and dry mouth.  He fills up quickly.     - Supplements:  Pt was drinking 1 can Ensure per day, then increased to 2 cans per day.  Recently, his home care nurse advised him to go up to 3 cans per day.  He tolerates the Ensure well.   Pt thinks he has lost weight \"all over,\" in his face, arms, legs, and stomach.  He feels weak and deconditioned.  Pt has not been checking his blood sugars on a regular basis.  When he does check them, it will be at 7 am and 10 pm.  They run anywhere between 140 and > 200.  (Very erratic).  Family member wondering whether any of his meds may be causing taste changes?  Note considering TCU at discharge.    CURRENT NUTRITION ORDERS  Diet Order:     Moderate Consistent Carbohydrate - Note this provides 2599-7956 kcals per day = 20-23 kcal/kg.    Current Intake/Tolerance:  Pt ate 1/2 scrambled eggs, pears, and lemonade for breakfast this morning.    PHYSICAL FINDINGS  Observed  Muscle Wasting - temporal, clavicle  Subcutaneous fat loss - " "orbital, arms  Obtained from Chart/Interdisciplinary Team  None noted    ANTHROPOMETRICS  Height: 6' 0\"  Weight:  81.1 kg (179 lbs 0 oz  Body mass index is 24.27 kg/(m^2).  Weight Status:  Normal BMI  IBW:  81 kg  % IBW:  100%  Weight History:  Weight loss 40 lbs (19%) over the past 4 months.  Pt states he 1st lost 10 lbs over 2 months, and then lost 30 more lbs over past 45 days.    Wt Readings from Last 20 Encounters:   02/07/17 81.194 kg (179 lb)   01/24/17 85.7 kg (188 lb 15 oz)   12/29/16 95.255 kg (210 lb)   12/22/16 95.255 kg (210 lb)   10/27/16 99.791 kg (220 lb)   10/13/16 99.338 kg (219 lb)   09/22/16 99.338 kg (219 lb)   09/08/16 99.338 kg (219 lb)   08/23/16 99.338 kg (219 lb)   08/08/16 99.338 kg (219 lb)   07/21/16 99.338 kg (219 lb)   02/01/16 103.556 kg (228 lb 4.8 oz)   01/19/16 99.791 kg (220 lb)   05/23/14 102.195 kg (225 lb 4.8 oz)   01/21/14 96.163 kg (212 lb)   01/21/14 96.525 kg (212 lb 12.8 oz)   01/16/14 102.059 kg (225 lb)   01/14/14 99.2 kg (218 lb 11.1 oz)   01/09/14 97.433 kg (214 lb 12.8 oz)   04/15/13 99.701 kg (219 lb 12.8 oz)       LABS  Labs reviewed  Accuchecks:  206, 197, 258, 214     MEDICATIONS  Medications reviewed  Glucotrol, Lantus, Glucophage, Low SSI - for glycemic control  Vit C and Vit D - for supplementation    Dosing Weight:  81.1 kg      ASSESSED NUTRITION NEEDS:  Estimated Energy Needs: 3517-4681 kcals (25-30 Kcal/Kg)  Justification: maintenance  Estimated Protein Needs:  grams protein (1.2-1.5 g pro/Kg)  Justification: Repletion  Estimated Fluid Needs: 9116-2583 mL (1 mL/Kcal)  Justification: maintenance    MALNUTRITION:  % Weight Loss:  > 7.5% in 3 months (severe malnutrition)  % Intake:  </= 50% for >/= 5 days (severe malnutrition)  Subcutaneous Fat Loss:  Orbital region mild depletion and Upper arm region mild depletion  Muscle Loss:  Temporal region mild depletion and Clavicle bone region moderate depletion  Fluid Retention:  None noted    Malnutrition " Diagnosis: Severe malnutrition  In Context of:  Acute illness or injury  Chronic illness or disease    NUTRITION DIAGNOSIS:  Inadequate oral intake related to decreased appetite, early satiety, taste changes, and dry mouth as evidenced by poor oral intake over past 4 months with 19% weight loss, loss of fat and muscle mass.    NUTRITION INTERVENTIONS  Recommendations / Nutrition Prescription  Change to High Consistent CHO (1990-6330 kcals per day)  8 oz strawberry Glucerna BID btw meals    Implementation  Nutrition education:    Discussed diet order and the use of supplements.      Nutrition Education (Content):  a) Provided handouts:  How to [place] with Poor Appetite and Weight Loss, How to [place] with Taste Changes, Supplements after Discharge, CHO Counting, Living Healthy with Diabetes   b) Discussed strategies to optimize intake (small frequent meals).    Nutrition Education (Application):  a) Discussed eating habits and recommended alternative food choices    Patient verbalizes understanding of diet by stating he will try the Glucerna supplement and work on increasing oral intake.    Anticipate fair compliance    Diet Education - refer to Education Flowsheet    Medical Food Supplement - Ordered supplement in EPIC    Nutrition Goals  Pt will consume > 75% meals and supplements in 3-5 days    MONITORING AND EVALUATION:  Progress towards goals will be monitored and evaluated per protocol and Practice Guidelines    Mayra River, RD, LD, CNSC

## 2017-02-08 NOTE — PLAN OF CARE
Problem: Goal Outcome Summary  Goal: Goal Outcome Summary  Outcome: Improving  List all goals to be met before discharge home   - Diagnostic tests and consults completed and resulted - not met  - Vital signs normal or at patient baseline -  met  - Tolerating oral intake to maintain hydration - met   - Adequate pain control on oral analgesia - partially met  - Tolerating oral antibiotics or has plans for home infusion setup - NA  - Infection is improving - NA  - Return to baseline functional status - Not met,  - Dyspnea improved and oxygen saturations greater than 88% on room air or prior home oxygen levels - met   - Safe disposition plan has been identified - not met,  Pt A&O, VSS, pt unable to stand due to weakness in legs, right greater than left.  Pt tolerating pain with oral PO pain meds.  Lidocaine patch on pt. PT has redness on buttocks, turned pt through the night.  Will continue to monitor.

## 2017-02-08 NOTE — PLAN OF CARE
Problem: Goal Outcome Summary  Goal: Goal Outcome Summary  Outcome: No Change  HTN, otherwise vss.  Pain 4-6/10 at rest, 9/10 during repositions and while lying flat. Tylenol, oxycodone given for pain.  MRI scheduled today, IV dilaudid to be given prior to MRI for additional pain relief. Small unblanchable area on buttocks, occasionally incontinent of urine.    CHO high calorie diet.

## 2017-02-08 NOTE — PLAN OF CARE
Problem: Goal Outcome Summary  Goal: Goal Outcome Summary  PT: Orders received, chart reviewed. Pt admitted with a fall, back pain, R LE weakness. Pt is requiring the lift as he was not able to stand with assist of 2. Pt will need a TCU at this time.  Hold PT eval as pt has consultants to see him, MRI pending.

## 2017-02-09 ENCOUNTER — APPOINTMENT (OUTPATIENT)
Dept: CT IMAGING | Facility: CLINIC | Age: 77
DRG: 456 | End: 2017-02-09
Attending: NEUROLOGICAL SURGERY
Payer: MEDICARE

## 2017-02-09 ENCOUNTER — APPOINTMENT (OUTPATIENT)
Dept: GENERAL RADIOLOGY | Facility: CLINIC | Age: 77
DRG: 456 | End: 2017-02-09
Attending: NEUROLOGICAL SURGERY
Payer: MEDICARE

## 2017-02-09 ENCOUNTER — HOSPITAL ENCOUNTER (INPATIENT)
Facility: CLINIC | Age: 77
LOS: 6 days | Discharge: NURSING, REHAB OR LONG-TERM CARE | DRG: 456 | End: 2017-02-15
Attending: NEUROLOGICAL SURGERY | Admitting: NEUROLOGICAL SURGERY
Payer: MEDICARE

## 2017-02-09 DIAGNOSIS — M54.5 ACUTE MIDLINE LOW BACK PAIN, WITH SCIATICA PRESENCE UNSPECIFIED: ICD-10-CM

## 2017-02-09 DIAGNOSIS — G06.2 EPIDURAL ABSCESS: Primary | ICD-10-CM

## 2017-02-09 LAB
ABO + RH BLD: NORMAL
ABO + RH BLD: NORMAL
ANION GAP SERPL CALCULATED.3IONS-SCNC: 11 MMOL/L (ref 3–14)
APTT PPP: 31 SEC (ref 22–37)
BASE EXCESS BLDA CALC-SCNC: 0.4 MMOL/L
BLD GP AB SCN SERPL QL: NORMAL
BLD PROD TYP BPU: NORMAL
BLD UNIT ID BPU: 0
BLOOD BANK CMNT PATIENT-IMP: NORMAL
BLOOD PRODUCT CODE: NORMAL
BPU ID: NORMAL
BUN SERPL-MCNC: 18 MG/DL (ref 7–30)
CA-I BLD-MCNC: 5 MG/DL (ref 4.4–5.2)
CALCIUM SERPL-MCNC: 8.3 MG/DL (ref 8.5–10.1)
CHLORIDE SERPL-SCNC: 99 MMOL/L (ref 94–109)
CO2 SERPL-SCNC: 23 MMOL/L (ref 20–32)
CREAT SERPL-MCNC: 0.81 MG/DL (ref 0.66–1.25)
ERYTHROCYTE [DISTWIDTH] IN BLOOD BY AUTOMATED COUNT: 16.6 % (ref 10–15)
ERYTHROCYTE [DISTWIDTH] IN BLOOD BY AUTOMATED COUNT: 16.7 % (ref 10–15)
FIBRINOGEN PPP-MCNC: 538 MG/DL (ref 200–420)
GFR SERPL CREATININE-BSD FRML MDRD: ABNORMAL ML/MIN/1.7M2
GLUCOSE BLD-MCNC: 175 MG/DL (ref 70–99)
GLUCOSE SERPL-MCNC: 224 MG/DL (ref 70–99)
HCO3 BLD-SCNC: 25 MMOL/L (ref 21–28)
HCT VFR BLD AUTO: 22.6 % (ref 40–53)
HCT VFR BLD AUTO: 26.2 % (ref 40–53)
HGB BLD-MCNC: 7.2 G/DL (ref 13.3–17.7)
HGB BLD-MCNC: 7.2 G/DL (ref 13.3–17.7)
HGB BLD-MCNC: 8.3 G/DL (ref 13.3–17.7)
HGB BLD-MCNC: 8.9 G/DL (ref 13.3–17.7)
INR PPP: 1.25 (ref 0.86–1.14)
INTERPRETATION ECG - MUSE: NORMAL
MCH RBC QN AUTO: 27.2 PG (ref 26.5–33)
MCH RBC QN AUTO: 27.5 PG (ref 26.5–33)
MCHC RBC AUTO-ENTMCNC: 31.7 G/DL (ref 31.5–36.5)
MCHC RBC AUTO-ENTMCNC: 31.9 G/DL (ref 31.5–36.5)
MCV RBC AUTO: 86 FL (ref 78–100)
MCV RBC AUTO: 86 FL (ref 78–100)
MRSA DNA SPEC QL NAA+PROBE: NORMAL
NUM BPU REQUESTED: 1
NUM BPU REQUESTED: 2
O2/TOTAL GAS SETTING VFR VENT: 44 %
PCO2 BLD: 39 MM HG (ref 35–45)
PH BLD: 7.41 PH (ref 7.35–7.45)
PLATELET # BLD AUTO: 174 10E9/L (ref 150–450)
PLATELET # BLD AUTO: 179 10E9/L (ref 150–450)
PO2 BLD: 162 MM HG (ref 80–105)
POTASSIUM BLD-SCNC: 3.4 MMOL/L (ref 3.4–5.3)
POTASSIUM SERPL-SCNC: 3.7 MMOL/L (ref 3.4–5.3)
RBC # BLD AUTO: 2.62 10E12/L (ref 4.4–5.9)
RBC # BLD AUTO: 3.05 10E12/L (ref 4.4–5.9)
SODIUM BLD-SCNC: 133 MMOL/L (ref 133–144)
SODIUM SERPL-SCNC: 133 MMOL/L (ref 133–144)
SPECIMEN EXP DATE BLD: NORMAL
SPECIMEN SOURCE: NORMAL
TRANSFUSION STATUS PATIENT QL: NORMAL
WBC # BLD AUTO: 8.6 10E9/L (ref 4–11)
WBC # BLD AUTO: 8.8 10E9/L (ref 4–11)

## 2017-02-09 PROCEDURE — 80048 BASIC METABOLIC PNL TOTAL CA: CPT | Performed by: STUDENT IN AN ORGANIZED HEALTH CARE EDUCATION/TRAINING PROGRAM

## 2017-02-09 PROCEDURE — 25000125 ZZHC RX 250

## 2017-02-09 PROCEDURE — 27210995 ZZH RX 272: Performed by: NEUROLOGICAL SURGERY

## 2017-02-09 PROCEDURE — 82330 ASSAY OF CALCIUM: CPT | Performed by: NEUROLOGICAL SURGERY

## 2017-02-09 PROCEDURE — 72128 CT CHEST SPINE W/O DYE: CPT

## 2017-02-09 PROCEDURE — 82803 BLOOD GASES ANY COMBINATION: CPT | Performed by: NEUROLOGICAL SURGERY

## 2017-02-09 PROCEDURE — 20000004 ZZH R&B ICU UMMC

## 2017-02-09 PROCEDURE — 25000125 ZZHC RX 250: Performed by: NEUROLOGICAL SURGERY

## 2017-02-09 PROCEDURE — P9016 RBC LEUKOCYTES REDUCED: HCPCS | Performed by: STUDENT IN AN ORGANIZED HEALTH CARE EDUCATION/TRAINING PROGRAM

## 2017-02-09 PROCEDURE — 36415 COLL VENOUS BLD VENIPUNCTURE: CPT | Performed by: NEUROLOGICAL SURGERY

## 2017-02-09 PROCEDURE — 25000125 ZZHC RX 250: Performed by: STUDENT IN AN ORGANIZED HEALTH CARE EDUCATION/TRAINING PROGRAM

## 2017-02-09 PROCEDURE — 25000128 H RX IP 250 OP 636: Performed by: NEUROLOGICAL SURGERY

## 2017-02-09 PROCEDURE — 82947 ASSAY GLUCOSE BLOOD QUANT: CPT | Performed by: NEUROLOGICAL SURGERY

## 2017-02-09 PROCEDURE — 82962 GLUCOSE BLOOD TEST: CPT

## 2017-02-09 PROCEDURE — 87076 CULTURE ANAEROBE IDENT EACH: CPT | Performed by: NEUROLOGICAL SURGERY

## 2017-02-09 PROCEDURE — 93005 ELECTROCARDIOGRAM TRACING: CPT

## 2017-02-09 PROCEDURE — 85027 COMPLETE CBC AUTOMATED: CPT | Performed by: STUDENT IN AN ORGANIZED HEALTH CARE EDUCATION/TRAINING PROGRAM

## 2017-02-09 PROCEDURE — 84295 ASSAY OF SERUM SODIUM: CPT | Performed by: NEUROLOGICAL SURGERY

## 2017-02-09 PROCEDURE — 87077 CULTURE AEROBIC IDENTIFY: CPT | Performed by: NEUROLOGICAL SURGERY

## 2017-02-09 PROCEDURE — 36415 COLL VENOUS BLD VENIPUNCTURE: CPT | Performed by: STUDENT IN AN ORGANIZED HEALTH CARE EDUCATION/TRAINING PROGRAM

## 2017-02-09 PROCEDURE — 86850 RBC ANTIBODY SCREEN: CPT | Performed by: STUDENT IN AN ORGANIZED HEALTH CARE EDUCATION/TRAINING PROGRAM

## 2017-02-09 PROCEDURE — 86901 BLOOD TYPING SEROLOGIC RH(D): CPT | Performed by: STUDENT IN AN ORGANIZED HEALTH CARE EDUCATION/TRAINING PROGRAM

## 2017-02-09 PROCEDURE — 0PB40ZZ EXCISION OF THORACIC VERTEBRA, OPEN APPROACH: ICD-10-PCS | Performed by: NEUROLOGICAL SURGERY

## 2017-02-09 PROCEDURE — A9270 NON-COVERED ITEM OR SERVICE: HCPCS | Mod: GY | Performed by: STUDENT IN AN ORGANIZED HEALTH CARE EDUCATION/TRAINING PROGRAM

## 2017-02-09 PROCEDURE — 87040 BLOOD CULTURE FOR BACTERIA: CPT | Performed by: NEUROLOGICAL SURGERY

## 2017-02-09 PROCEDURE — 25000132 ZZH RX MED GY IP 250 OP 250 PS 637: Mod: GY | Performed by: STUDENT IN AN ORGANIZED HEALTH CARE EDUCATION/TRAINING PROGRAM

## 2017-02-09 PROCEDURE — 85384 FIBRINOGEN ACTIVITY: CPT | Performed by: STUDENT IN AN ORGANIZED HEALTH CARE EDUCATION/TRAINING PROGRAM

## 2017-02-09 PROCEDURE — 86900 BLOOD TYPING SEROLOGIC ABO: CPT | Performed by: STUDENT IN AN ORGANIZED HEALTH CARE EDUCATION/TRAINING PROGRAM

## 2017-02-09 PROCEDURE — 93010 ELECTROCARDIOGRAM REPORT: CPT | Mod: ZP | Performed by: INTERNAL MEDICINE

## 2017-02-09 PROCEDURE — 40000277 XR SURGERY CARM FLUORO LESS THAN 5 MIN W STILLS: Mod: TC

## 2017-02-09 PROCEDURE — 85730 THROMBOPLASTIN TIME PARTIAL: CPT | Performed by: STUDENT IN AN ORGANIZED HEALTH CARE EDUCATION/TRAINING PROGRAM

## 2017-02-09 PROCEDURE — 0W9K0ZX DRAINAGE OF UPPER BACK, OPEN APPROACH, DIAGNOSTIC: ICD-10-PCS | Performed by: NEUROLOGICAL SURGERY

## 2017-02-09 PROCEDURE — 25000131 ZZH RX MED GY IP 250 OP 636 PS 637: Mod: GY | Performed by: STUDENT IN AN ORGANIZED HEALTH CARE EDUCATION/TRAINING PROGRAM

## 2017-02-09 PROCEDURE — P9037 PLATE PHERES LEUKOREDU IRRAD: HCPCS | Performed by: NEUROLOGICAL SURGERY

## 2017-02-09 PROCEDURE — 86923 COMPATIBILITY TEST ELECTRIC: CPT | Performed by: STUDENT IN AN ORGANIZED HEALTH CARE EDUCATION/TRAINING PROGRAM

## 2017-02-09 PROCEDURE — 87186 SC STD MICRODIL/AGAR DIL: CPT | Performed by: NEUROLOGICAL SURGERY

## 2017-02-09 PROCEDURE — 87641 MR-STAPH DNA AMP PROBE: CPT | Performed by: STUDENT IN AN ORGANIZED HEALTH CARE EDUCATION/TRAINING PROGRAM

## 2017-02-09 PROCEDURE — 85610 PROTHROMBIN TIME: CPT | Performed by: STUDENT IN AN ORGANIZED HEALTH CARE EDUCATION/TRAINING PROGRAM

## 2017-02-09 PROCEDURE — 87640 STAPH A DNA AMP PROBE: CPT | Performed by: STUDENT IN AN ORGANIZED HEALTH CARE EDUCATION/TRAINING PROGRAM

## 2017-02-09 PROCEDURE — 25000128 H RX IP 250 OP 636: Performed by: STUDENT IN AN ORGANIZED HEALTH CARE EDUCATION/TRAINING PROGRAM

## 2017-02-09 PROCEDURE — 84132 ASSAY OF SERUM POTASSIUM: CPT | Performed by: NEUROLOGICAL SURGERY

## 2017-02-09 PROCEDURE — 25000128 H RX IP 250 OP 636

## 2017-02-09 PROCEDURE — 87070 CULTURE OTHR SPECIMN AEROBIC: CPT | Performed by: NEUROLOGICAL SURGERY

## 2017-02-09 PROCEDURE — P9037 PLATE PHERES LEUKOREDU IRRAD: HCPCS | Performed by: STUDENT IN AN ORGANIZED HEALTH CARE EDUCATION/TRAINING PROGRAM

## 2017-02-09 PROCEDURE — 87075 CULTR BACTERIA EXCEPT BLOOD: CPT | Performed by: NEUROLOGICAL SURGERY

## 2017-02-09 PROCEDURE — 85018 HEMOGLOBIN: CPT | Performed by: NEUROLOGICAL SURGERY

## 2017-02-09 RX ORDER — CALCIUM CARBONATE 500 MG/1
1-2 TABLET, CHEWABLE ORAL 4 TIMES DAILY PRN
Status: DISCONTINUED | OUTPATIENT
Start: 2017-02-09 | End: 2017-02-15 | Stop reason: HOSPADM

## 2017-02-09 RX ORDER — CEFAZOLIN SODIUM 2 G/100ML
2 INJECTION, SOLUTION INTRAVENOUS
Status: DISCONTINUED | OUTPATIENT
Start: 2017-02-09 | End: 2017-02-09 | Stop reason: HOSPADM

## 2017-02-09 RX ORDER — POLYETHYLENE GLYCOL 3350 17 G/17G
17 POWDER, FOR SOLUTION ORAL DAILY PRN
Status: DISCONTINUED | OUTPATIENT
Start: 2017-02-09 | End: 2017-02-10

## 2017-02-09 RX ORDER — SODIUM CHLORIDE 9 MG/ML
INJECTION, SOLUTION INTRAVENOUS CONTINUOUS
Status: DISCONTINUED | OUTPATIENT
Start: 2017-02-09 | End: 2017-02-15 | Stop reason: HOSPADM

## 2017-02-09 RX ORDER — ONDANSETRON 2 MG/ML
4 INJECTION INTRAMUSCULAR; INTRAVENOUS EVERY 6 HOURS PRN
Status: DISCONTINUED | OUTPATIENT
Start: 2017-02-09 | End: 2017-02-15 | Stop reason: HOSPADM

## 2017-02-09 RX ORDER — LIDOCAINE 40 MG/G
CREAM TOPICAL
Status: DISCONTINUED | OUTPATIENT
Start: 2017-02-09 | End: 2017-02-15 | Stop reason: HOSPADM

## 2017-02-09 RX ORDER — SODIUM CHLORIDE 9 MG/ML
INJECTION, SOLUTION INTRAVENOUS CONTINUOUS
Status: DISCONTINUED | OUTPATIENT
Start: 2017-02-09 | End: 2017-02-09

## 2017-02-09 RX ORDER — OXYCODONE HYDROCHLORIDE 5 MG/1
5-10 TABLET ORAL
Status: DISCONTINUED | OUTPATIENT
Start: 2017-02-09 | End: 2017-02-09

## 2017-02-09 RX ORDER — DEXAMETHASONE SODIUM PHOSPHATE 4 MG/ML
INJECTION, SOLUTION INTRA-ARTICULAR; INTRALESIONAL; INTRAMUSCULAR; INTRAVENOUS; SOFT TISSUE PRN
Status: DISCONTINUED | OUTPATIENT
Start: 2017-02-09 | End: 2017-02-09

## 2017-02-09 RX ORDER — GLYCOPYRROLATE 0.2 MG/ML
INJECTION, SOLUTION INTRAMUSCULAR; INTRAVENOUS PRN
Status: DISCONTINUED | OUTPATIENT
Start: 2017-02-09 | End: 2017-02-09

## 2017-02-09 RX ORDER — CEFAZOLIN SODIUM 1 G/3ML
1 INJECTION, POWDER, FOR SOLUTION INTRAMUSCULAR; INTRAVENOUS SEE ADMIN INSTRUCTIONS
Status: DISCONTINUED | OUTPATIENT
Start: 2017-02-09 | End: 2017-02-09 | Stop reason: HOSPADM

## 2017-02-09 RX ORDER — FENTANYL CITRATE 50 UG/ML
INJECTION, SOLUTION INTRAMUSCULAR; INTRAVENOUS PRN
Status: DISCONTINUED | OUTPATIENT
Start: 2017-02-09 | End: 2017-02-09

## 2017-02-09 RX ORDER — AMOXICILLIN 250 MG
1-2 CAPSULE ORAL 2 TIMES DAILY
Status: DISCONTINUED | OUTPATIENT
Start: 2017-02-09 | End: 2017-02-15 | Stop reason: HOSPADM

## 2017-02-09 RX ORDER — TAMSULOSIN HYDROCHLORIDE 0.4 MG/1
0.8 CAPSULE ORAL AT BEDTIME
Status: DISCONTINUED | OUTPATIENT
Start: 2017-02-09 | End: 2017-02-15 | Stop reason: HOSPADM

## 2017-02-09 RX ORDER — SODIUM CHLORIDE 9 MG/ML
INJECTION, SOLUTION INTRAVENOUS CONTINUOUS PRN
Status: DISCONTINUED | OUTPATIENT
Start: 2017-02-09 | End: 2017-02-09

## 2017-02-09 RX ORDER — NEOSTIGMINE METHYLSULFATE 1 MG/ML
VIAL (ML) INJECTION PRN
Status: DISCONTINUED | OUTPATIENT
Start: 2017-02-09 | End: 2017-02-09

## 2017-02-09 RX ORDER — AMOXICILLIN 250 MG
1-2 CAPSULE ORAL 2 TIMES DAILY PRN
Status: DISCONTINUED | OUTPATIENT
Start: 2017-02-09 | End: 2017-02-15 | Stop reason: HOSPADM

## 2017-02-09 RX ORDER — ONDANSETRON 4 MG/1
4 TABLET, ORALLY DISINTEGRATING ORAL EVERY 6 HOURS PRN
Status: DISCONTINUED | OUTPATIENT
Start: 2017-02-09 | End: 2017-02-15 | Stop reason: HOSPADM

## 2017-02-09 RX ORDER — EPHEDRINE SULFATE 50 MG/ML
INJECTION, SOLUTION INTRAMUSCULAR; INTRAVENOUS; SUBCUTANEOUS PRN
Status: DISCONTINUED | OUTPATIENT
Start: 2017-02-09 | End: 2017-02-09

## 2017-02-09 RX ORDER — ACETAMINOPHEN 325 MG/1
650 TABLET ORAL EVERY 4 HOURS PRN
Status: DISCONTINUED | OUTPATIENT
Start: 2017-02-12 | End: 2017-02-15 | Stop reason: HOSPADM

## 2017-02-09 RX ORDER — DEXTROSE MONOHYDRATE 25 G/50ML
25-50 INJECTION, SOLUTION INTRAVENOUS
Status: DISCONTINUED | OUTPATIENT
Start: 2017-02-09 | End: 2017-02-12

## 2017-02-09 RX ORDER — PROPOFOL 10 MG/ML
INJECTION, EMULSION INTRAVENOUS PRN
Status: DISCONTINUED | OUTPATIENT
Start: 2017-02-09 | End: 2017-02-09

## 2017-02-09 RX ORDER — CYCLOBENZAPRINE HCL 5 MG
5 TABLET ORAL EVERY 8 HOURS PRN
Status: DISCONTINUED | OUTPATIENT
Start: 2017-02-09 | End: 2017-02-15 | Stop reason: HOSPADM

## 2017-02-09 RX ORDER — ACETAMINOPHEN 325 MG/1
975 TABLET ORAL EVERY 8 HOURS
Status: DISPENSED | OUTPATIENT
Start: 2017-02-09 | End: 2017-02-12

## 2017-02-09 RX ORDER — ATENOLOL 25 MG/1
25 TABLET ORAL DAILY
Status: DISCONTINUED | OUTPATIENT
Start: 2017-02-09 | End: 2017-02-10

## 2017-02-09 RX ORDER — HYDROMORPHONE HYDROCHLORIDE 1 MG/ML
.3-.5 INJECTION, SOLUTION INTRAMUSCULAR; INTRAVENOUS; SUBCUTANEOUS
Status: DISCONTINUED | OUTPATIENT
Start: 2017-02-09 | End: 2017-02-10

## 2017-02-09 RX ORDER — PIPERACILLIN SODIUM, TAZOBACTAM SODIUM 3; .375 G/15ML; G/15ML
INJECTION, POWDER, LYOPHILIZED, FOR SOLUTION INTRAVENOUS PRN
Status: DISCONTINUED | OUTPATIENT
Start: 2017-02-09 | End: 2017-02-09

## 2017-02-09 RX ORDER — NICOTINE POLACRILEX 4 MG
15-30 LOZENGE BUCCAL
Status: DISCONTINUED | OUTPATIENT
Start: 2017-02-09 | End: 2017-02-12

## 2017-02-09 RX ORDER — PIPERACILLIN SODIUM, TAZOBACTAM SODIUM 3; .375 G/15ML; G/15ML
3.38 INJECTION, POWDER, LYOPHILIZED, FOR SOLUTION INTRAVENOUS EVERY 6 HOURS
Status: DISCONTINUED | OUTPATIENT
Start: 2017-02-09 | End: 2017-02-09

## 2017-02-09 RX ORDER — NALOXONE HYDROCHLORIDE 0.4 MG/ML
.1-.4 INJECTION, SOLUTION INTRAMUSCULAR; INTRAVENOUS; SUBCUTANEOUS
Status: DISCONTINUED | OUTPATIENT
Start: 2017-02-09 | End: 2017-02-15 | Stop reason: HOSPADM

## 2017-02-09 RX ORDER — OXYCODONE HYDROCHLORIDE 5 MG/1
5-10 TABLET ORAL EVERY 4 HOURS PRN
Status: DISCONTINUED | OUTPATIENT
Start: 2017-02-09 | End: 2017-02-15 | Stop reason: HOSPADM

## 2017-02-09 RX ORDER — LIDOCAINE HYDROCHLORIDE 20 MG/ML
INJECTION, SOLUTION INFILTRATION; PERINEURAL PRN
Status: DISCONTINUED | OUTPATIENT
Start: 2017-02-09 | End: 2017-02-09

## 2017-02-09 RX ORDER — MEROPENEM 1 G/1
1 INJECTION, POWDER, FOR SOLUTION INTRAVENOUS EVERY 8 HOURS
Status: DISCONTINUED | OUTPATIENT
Start: 2017-02-09 | End: 2017-02-12

## 2017-02-09 RX ORDER — ATORVASTATIN CALCIUM 40 MG/1
40 TABLET, FILM COATED ORAL DAILY
Status: DISCONTINUED | OUTPATIENT
Start: 2017-02-09 | End: 2017-02-15 | Stop reason: HOSPADM

## 2017-02-09 RX ADMIN — VANCOMYCIN HYDROCHLORIDE 1500 MG: 10 INJECTION, POWDER, LYOPHILIZED, FOR SOLUTION INTRAVENOUS at 14:35

## 2017-02-09 RX ADMIN — TAMSULOSIN HYDROCHLORIDE 0.8 MG: 0.4 CAPSULE ORAL at 21:57

## 2017-02-09 RX ADMIN — PHENYLEPHRINE HYDROCHLORIDE 200 MCG: 10 INJECTION, SOLUTION INTRAMUSCULAR; INTRAVENOUS; SUBCUTANEOUS at 02:07

## 2017-02-09 RX ADMIN — SENNOSIDES AND DOCUSATE SODIUM 1 TABLET: 8.6; 5 TABLET ORAL at 09:42

## 2017-02-09 RX ADMIN — SODIUM CHLORIDE 75 ML/HR: 9 INJECTION, SOLUTION INTRAVENOUS at 06:45

## 2017-02-09 RX ADMIN — VASOPRESSIN 1 UNITS: 20 INJECTION, SOLUTION INTRAMUSCULAR; SUBCUTANEOUS at 03:01

## 2017-02-09 RX ADMIN — SODIUM CHLORIDE: 9 INJECTION, SOLUTION INTRAVENOUS at 01:06

## 2017-02-09 RX ADMIN — PROPOFOL 100 MG: 10 INJECTION, EMULSION INTRAVENOUS at 02:08

## 2017-02-09 RX ADMIN — INSULIN ASPART 4 UNITS: 100 INJECTION, SOLUTION INTRAVENOUS; SUBCUTANEOUS at 09:26

## 2017-02-09 RX ADMIN — OXYCODONE HYDROCHLORIDE 10 MG: 5 TABLET ORAL at 22:01

## 2017-02-09 RX ADMIN — VASOPRESSIN 1 UNITS: 20 INJECTION, SOLUTION INTRAMUSCULAR; SUBCUTANEOUS at 02:30

## 2017-02-09 RX ADMIN — PHENYLEPHRINE HYDROCHLORIDE 100 MCG: 10 INJECTION, SOLUTION INTRAMUSCULAR; INTRAVENOUS; SUBCUTANEOUS at 02:28

## 2017-02-09 RX ADMIN — ROCURONIUM BROMIDE 50 MG: 10 INJECTION INTRAVENOUS at 02:08

## 2017-02-09 RX ADMIN — VANCOMYCIN HYDROCHLORIDE 1000 MG: 1 INJECTION, POWDER, LYOPHILIZED, FOR SOLUTION INTRAVENOUS at 03:49

## 2017-02-09 RX ADMIN — ATORVASTATIN CALCIUM 40 MG: 40 TABLET, FILM COATED ORAL at 09:42

## 2017-02-09 RX ADMIN — HYDROMORPHONE HYDROCHLORIDE 0.5 MG: 10 INJECTION, SOLUTION INTRAMUSCULAR; INTRAVENOUS; SUBCUTANEOUS at 23:35

## 2017-02-09 RX ADMIN — PIPERACILLIN SODIUM AND TAZOBACTAM SODIUM 3.38 G: .375; 3 INJECTION, POWDER, LYOPHILIZED, FOR SOLUTION INTRAVENOUS at 04:06

## 2017-02-09 RX ADMIN — ACETAMINOPHEN 975 MG: 325 TABLET, FILM COATED ORAL at 06:45

## 2017-02-09 RX ADMIN — OXYCODONE HYDROCHLORIDE 10 MG: 5 TABLET ORAL at 17:19

## 2017-02-09 RX ADMIN — LIDOCAINE HYDROCHLORIDE 100 MG: 20 INJECTION, SOLUTION INFILTRATION; PERINEURAL at 02:08

## 2017-02-09 RX ADMIN — ATENOLOL 25 MG: 25 TABLET ORAL at 09:42

## 2017-02-09 RX ADMIN — PHENYLEPHRINE HYDROCHLORIDE 200 MCG: 10 INJECTION, SOLUTION INTRAMUSCULAR; INTRAVENOUS; SUBCUTANEOUS at 02:00

## 2017-02-09 RX ADMIN — SODIUM CHLORIDE: 9 INJECTION, SOLUTION INTRAVENOUS at 01:23

## 2017-02-09 RX ADMIN — PHENYLEPHRINE HYDROCHLORIDE 200 MCG: 10 INJECTION, SOLUTION INTRAMUSCULAR; INTRAVENOUS; SUBCUTANEOUS at 02:03

## 2017-02-09 RX ADMIN — PHENYLEPHRINE HYDROCHLORIDE 200 MCG: 10 INJECTION, SOLUTION INTRAMUSCULAR; INTRAVENOUS; SUBCUTANEOUS at 02:15

## 2017-02-09 RX ADMIN — DEXAMETHASONE SODIUM PHOSPHATE 6 MG: 4 INJECTION, SOLUTION INTRAMUSCULAR; INTRAVENOUS at 02:56

## 2017-02-09 RX ADMIN — FENTANYL CITRATE 50 MCG: 50 INJECTION, SOLUTION INTRAMUSCULAR; INTRAVENOUS at 02:51

## 2017-02-09 RX ADMIN — INSULIN ASPART 2 UNITS: 100 INJECTION, SOLUTION INTRAVENOUS; SUBCUTANEOUS at 14:31

## 2017-02-09 RX ADMIN — ACETAMINOPHEN 975 MG: 325 TABLET, FILM COATED ORAL at 21:57

## 2017-02-09 RX ADMIN — MEROPENEM 1 G: 1 INJECTION, POWDER, FOR SOLUTION INTRAVENOUS at 23:42

## 2017-02-09 RX ADMIN — INSULIN ASPART 1 UNITS: 100 INJECTION, SOLUTION INTRAVENOUS; SUBCUTANEOUS at 17:24

## 2017-02-09 RX ADMIN — VASOPRESSIN 1 UNITS: 20 INJECTION, SOLUTION INTRAMUSCULAR; SUBCUTANEOUS at 03:35

## 2017-02-09 RX ADMIN — PANTOPRAZOLE SODIUM 40 MG: 40 INJECTION, POWDER, FOR SOLUTION INTRAVENOUS at 09:42

## 2017-02-09 RX ADMIN — OXYCODONE HYDROCHLORIDE 10 MG: 5 TABLET ORAL at 11:29

## 2017-02-09 RX ADMIN — Medication 10 MG: at 02:05

## 2017-02-09 RX ADMIN — VASOPRESSIN 1 UNITS: 20 INJECTION, SOLUTION INTRAMUSCULAR; SUBCUTANEOUS at 03:23

## 2017-02-09 RX ADMIN — MEROPENEM 1 G: 1 INJECTION, POWDER, FOR SOLUTION INTRAVENOUS at 16:07

## 2017-02-09 RX ADMIN — FENTANYL CITRATE 100 MCG: 50 INJECTION, SOLUTION INTRAMUSCULAR; INTRAVENOUS at 02:08

## 2017-02-09 RX ADMIN — GLYCOPYRROLATE 0.8 MG: 0.2 INJECTION, SOLUTION INTRAMUSCULAR; INTRAVENOUS at 04:03

## 2017-02-09 RX ADMIN — PHENYLEPHRINE HYDROCHLORIDE 100 MCG: 10 INJECTION, SOLUTION INTRAMUSCULAR; INTRAVENOUS; SUBCUTANEOUS at 02:34

## 2017-02-09 RX ADMIN — VASOPRESSIN 1 UNITS: 20 INJECTION, SOLUTION INTRAMUSCULAR; SUBCUTANEOUS at 03:48

## 2017-02-09 RX ADMIN — Medication 10 MG: at 02:10

## 2017-02-09 RX ADMIN — Medication 4 MG: at 04:03

## 2017-02-09 RX ADMIN — VASOPRESSIN 1 UNITS: 20 INJECTION, SOLUTION INTRAMUSCULAR; SUBCUTANEOUS at 02:42

## 2017-02-09 RX ADMIN — Medication 10 MG: at 02:14

## 2017-02-09 RX ADMIN — VASOPRESSIN 1 UNITS: 20 INJECTION, SOLUTION INTRAMUSCULAR; SUBCUTANEOUS at 03:12

## 2017-02-09 RX ADMIN — Medication 10 MG: at 02:00

## 2017-02-09 RX ADMIN — SODIUM CHLORIDE: 9 INJECTION, SOLUTION INTRAVENOUS at 02:16

## 2017-02-09 RX ADMIN — HYDROMORPHONE HYDROCHLORIDE 0.5 MG: 10 INJECTION, SOLUTION INTRAMUSCULAR; INTRAVENOUS; SUBCUTANEOUS at 19:30

## 2017-02-09 ASSESSMENT — VISUAL ACUITY
OU: NORMAL ACUITY

## 2017-02-09 ASSESSMENT — ACTIVITIES OF DAILY LIVING (ADL)
NUMBER_OF_TIMES_PATIENT_HAS_FALLEN_WITHIN_LAST_SIX_MONTHS: 3
DRESS: 0-->INDEPENDENT
BATHING: 0-->INDEPENDENT
COGNITION: 0 - NO COGNITION ISSUES REPORTED
SWALLOWING: 0-->SWALLOWS FOODS/LIQUIDS WITHOUT DIFFICULTY
FALL_HISTORY_WITHIN_LAST_SIX_MONTHS: YES
AMBULATION: 1-->ASSISTIVE EQUIPMENT
RETIRED_EATING: 0-->INDEPENDENT
RETIRED_COMMUNICATION: 0-->UNDERSTANDS/COMMUNICATES WITHOUT DIFFICULTY
WHICH_OF_THE_ABOVE_FUNCTIONAL_RISKS_HAD_A_RECENT_ONSET_OR_CHANGE?: AMBULATION;TRANSFERRING;FALL HISTORY
TRANSFERRING: 1-->ASSISTIVE EQUIPMENT
TOILETING: 0-->INDEPENDENT

## 2017-02-09 ASSESSMENT — PAIN DESCRIPTION - DESCRIPTORS
DESCRIPTORS: ACHING
DESCRIPTORS: ACHING

## 2017-02-09 NOTE — ANESTHESIA PREPROCEDURE EVALUATION
Anesthesia Evaluation     . Pt has had prior anesthetic. Type: General    History of anesthetic complications     ROS/MED HX    ENT/Pulmonary:     (+)COPD, , . .    Neurologic:       Cardiovascular:     (+) hypertension--CAD, --. : . . . :. .       METS/Exercise Tolerance:     Hematologic:         Musculoskeletal:         GI/Hepatic:         Renal/Genitourinary:     (+) chronic renal disease,       Endo:     (+) type II DM .      Psychiatric:         Infectious Disease:         Malignancy:         Other:               Physical Exam  Normal systems: cardiovascular and pulmonary    Airway   Mallampati: II  TM distance: >3 FB  Neck ROM: full    Dental   (+) partials    Cardiovascular       Pulmonary     Other findings: Patient has some sensation to touch on right leg.  No sensation to touch on left leg.  Patient is unable to bend his legs at the hips, knees, feet or toes.  Bilateral arm strength is 4/5.                Anesthesia Plan      History & Physical Review  History and physical reviewed and following examination; no interval change.    ASA Status:  3 emergent.    NPO Status:  Waived due to emergency    Plan for General and ETT with Intravenous induction. Maintenance will be Balanced.    PONV prophylaxis:  Ondansetron (or other 5HT-3) and Dexamethasone or Solumedrol       Postoperative Care  Postoperative pain management:  IV analgesics.      Consents  Anesthetic plan, risks, benefits and alternatives discussed with:  Patient.  Use of blood products discussed: Yes.   Use of blood products discussed with Patient.  Consented to blood products.  .            The surgeon has demanded to proceed to the operating room knowing that there is currently no blood available for the patient.  2/9/17 at 0130              .

## 2017-02-09 NOTE — PHARMACY-VANCOMYCIN DOSING SERVICE
Pharmacy Vancomycin Initial Note  Date of Service 2017  Patient's  1940  76 year old, male    Indication: Osteomyelitis    Current estimated CrCl = Estimated Creatinine Clearance: 103.5 mL/min (based on Cr of 0.75).    Creatinine for last 3 days  2017:  6:55 PM Creatinine 0.75 mg/dL    Recent Vancomycin Level(s) for last 3 days  No results found for requested labs within last 3 days.      Vancomycin IV Administrations (past 72 hours)                   vancomycin vial 1000 mg (mg) 1,000 mg Given 17 0349                Nephrotoxins and other renal medications (Future)    Start     Dose/Rate Route Frequency Ordered Stop    17 1200  vancomycin (VANCOCIN) 1,500 mg in NaCl 0.9 % 250 mL intermittent infusion      1,500 mg Intravenous EVERY 12 HOURS 17 0614      17 1000  piperacillin-tazobactam (ZOSYN) 3.375 g vial to attach to  mL bag      3.375 g  over 1 Hours Intravenous EVERY 6 HOURS 17 0552            Contrast Orders - past 72 hours     None                Plan:  1.  Start vancomycin  1500 mg IV q12h.   2.  Goal Trough Level: 15-20 mg/L   3.  Pharmacy will check trough levels as appropriate in 1-3 Days.    4. Serum creatinine levels will be ordered daily for the first week of therapy and at least twice weekly for subsequent weeks.    5. [place] method utilized to dose vancomycin therapy: Method 1    Bj Castro

## 2017-02-09 NOTE — PROVIDER NOTIFICATION
MD Notification    Notified Person:  MD    Notified Persons Name: DICK Shoemaker    Notification Date/Time: 2/8/17 2015    Notification Interaction:  Talked with PA    Purpose of Notification: neuro status change, pt unable to move right foot    Orders Received:     Comments: PA currently consulting with neurosurgery about pt and will update them on neuro change

## 2017-02-09 NOTE — LETTER
Transition Communication Hand-off for Care Transitions to Next Level of Care Provider    Name: Tad Manning  MRN #: [id number]  Primary Care Provider: Gene Guevara     Primary Clinic: [address] [address]     Reason for Hospitalization:  spinal osteomyolitis w/discitis, spinal compression, Epidural abscess  Thoracic Osteomyelitis/Discitis with Epidural Abscess and Spinal Cord Compression, Epidural Abscess    Admit Date/Time: 2/9/2017 12:35 AM  Discharge Date: 2/15/2017    Payor Source: Payor: MEDICARE / Plan: MEDICARE / Product Type: Medicare /     Reason for Communication Hand-off Referral: Other [facility] Standard of Practice    Discharge Needs Assessment:  Needs       Most Recent Value    Equipment Currently Used at Home none [owns FWW, borrowing RTS, roommate obtaining shower chair]    Transportation Available car, family or friend will provide          Follow-up plan:  Future Appointments  Date Time Provider Department Center   2/16/2017 11:00 AM Yael Amador PT [facility]   2/23/2017 9:30 AM Enzo Alonso DPM [facility]          Referrals     Future Labs/Procedures    Infectious Disease Referral     Comments:    Follow-up with Infectious Disease, Dr. Gillian Vila, on 3/23/17. If you have not heard back regarding your appointment time please call [phone] and ask to page the Infectious Disease fellow regarding your pre-planned appointment with Dr. Vila on 3/23/17 to assess antibiotic regimen and progress.     - Check CBC, CRP, creatinine, and LFTs weekly while on therapy and have results faxed to the [facility] at [phone].     - Continue ceftriaxone 2 g IV q12 h    Occupational Therapy Adult Consult     Comments:    Evaluate and treat as clinically indicated.    Reason:  Continued progress with therapy s/p thoracic spine surgery for epidural abscess, with continued improvement in strength seen    Physical Therapy  Adult Consult     Comments:    Evaluate and treat as clinically indicated.    Reason:  Continued therapy progress s/p thoracic spine surgery for epidural abscess, with continued improvement in strength            Key Recommendations:  PLEASE REVIEW AVS    Yael DIXONN RN PHN  Patient Care Mgmt Coordinator [facility]        AVS/Discharge Summary is the source of truth; this is a helpful guide for improved communication of patient story

## 2017-02-09 NOTE — H&P
Neurosurgery History and Physical    CC: lower extremity weakness     HPI: Tad Manning is a 76 year old male with CAD, DM with neuropathy, HTN, PVD with bilateral LE stents on ASA/plvx, Chronic lymphocytic leukemia in remission (last chemo in Sept 2015) who presents after a fall on 2/8 and R > L leg weakness and inability to ambulate. His story is inconsistent and somewhat tangential but he clearly articulates that 72 hours prior he was able to ambulation but now he cannot. 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 He denies loss of sensation, bowel or bladder dysfunction. He reports normal napoleon-anal sensation.    Past Medical/Surgical History   Past Medical History     Past Medical History    Diagnosis  Date       Coronary artery disease          triple bypass 1995       Diabetes mellitus (H)         History of blood transfusion         Hypertension         Malignant neoplasm (H)          CLL       Hyperlipidaemia         Sebaceous cyst         BMI 30.0-30.9,adult         BPH (benign prostatic hypertrophy)         Cellulitis         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)         Noninflammatory pericardial effusion         Osteomyelitis of left foot (H)         PVD (peripheral vascular disease) (H)         Diabetic polyneuropathy (H)         ASCVD (arteriosclerotic cardiovascular disease)         Arthritis                Past Surgical History     Past Surgical History    Procedure  Laterality  Date       Tonsillectomy           Bone marrow biopsy, bone specimen, needle/trocar    10/19/2012        Procedure: BIOPSY BONE MARROW;  BONE MARROW BIOPSY  (CONSCIOUS SED);  Surgeon: Ramon Quesada MD;  Location:  GI       Vascular surgery            ptcr legs       Bypass graft femoropopliteal    1/10/2014        Procedure: BYPASS GRAFT  FEMOROPOPLITEAL;  Left above knee popliteal to  Below knee popliteal bypass using transverse saphenous vein graft. Left 2nd Toe amputation;  Surgeon: Amador Vick MD;  Location:  OR       Amputate toe(s)    1/10/2014        Procedure: AMPUTATE TOE(S);;  Surgeon: Amador Vick MD;  Location: [facility] OR       Graft vein from extremity (location)    1/10/2014        Procedure: GRAFT VEIN FROM EXTREMITY (LOCATION);;  Surgeon: Amador Vick MD;  Location:  OR       Cardiac surgery            angioplasty with stent, triple bypass       Excise cyst generic (location)  N/A  2/1/2016        Procedure: EXCISE CYST GENERIC (LOCATION);  Surgeon: Amador Vick MD;  Location:  [facility]           Family History   Family History     Family History    Problem  Relation  Age of Onset       CANCER  Mother          leukemia           Social History  Social History    Substance Use Topics       Smoking status:  Former Smoker -- 2.00 packs/day for 30 years        Types:  Cigarettes        Quit date:  06/19/1995       Smokeless tobacco:  Never Used       Alcohol Use:  Yes          Comment: 1 drink monthly        Medications   Current Outpatient Prescriptions     Current Outpatient Prescriptions    Medication  Sig  Dispense  Refill       oxyCODONE (ROXICODONE) 5 MG IR tablet  Take 1-2 tablets (5-10 mg) by mouth every 3 hours as needed for moderate to severe pain    0       insulin aspart (NOVOLOG PEN) 100 UNIT/ML injection  Inject 1-6 Units Subcutaneous every 4 hours           insulin glargine (LANTUS) 100 UNIT/ML injection  Inject 5 Units Subcutaneous At Bedtime           lidocaine (LIDODERM) 5 % Patch  Place 1 patch onto the skin daily as needed for moderate pain to back for back pain           [DISCONTINUED] insulin glargine (LANTUS) 100 UNIT/ML injection  Inject 10 Units Subcutaneous At Bedtime           atenolol (TENORMIN) 25 MG tablet  Take 1 tablet (25 mg) by mouth daily  30 tablet  0       polyethylene glycol  (MIRALAX/GLYCOLAX) Packet  Take 17 g by mouth daily as needed for constipation  7 packet  0       senna-docusate (SENOKOT-S;PERICOLACE) 8.6-50 MG per tablet  Take 1-2 tablets by mouth 2 times daily as needed (constipation )  100 tablet  0       bacitracin-polymyxin b (POLYSPORIN) ointment  Apply topically 2 times daily  15 g  0       order for DME  Equipment being ordered: 2 shoe  1 Device  0       tamsulosin (FLOMAX) 0.4 MG 24 hr capsule  Take 0.8 mg by mouth At Bedtime            ZOLPIDEM TARTRATE PO  Take 5 mg by mouth nightly as needed            Cholecalciferol (VITAMIN D PO)  Take 1,000 Units by mouth daily.            atorvastatin (LIPITOR) 40 MG tablet  Take 1 tablet by mouth daily.  30 tablet  1       Ascorbic Acid (VITAMIN C PO)  Take 500 mg by mouth daily.           Nitroglycerin (NITROQUICK SL)  Place 0.4 mg under the tongue as needed.               Allergies  Allergies    Allergen  Reactions       Blood Transfusion Related (Informational Only)  Other (See Comments)        Patient has a history of a clinically significant antibody against RBC antigens.  A delay in compatible RBCs may occur.         ROS: 10 point ROS of systems including Constitutional, Eyes, Respiratory, Cardiovascular, Gastroenterology, Genitourinary, Integumentary, Muscularskeletal, Psychiatric were all negative except for pertinent positives noted in my HPI.       Physical Exam:      General:   Comfortable respiratory effort; normal cardiac rhythm.     Mental Status:    awake, alert and oriented x3.    fluent, communicative, intact comprehension.    Cranial Nerves:  equal and reactive pupils    extraocular movements preserved    no dysarthria    Motor/Sensory:  0/5 throughout the right lower extremity; left lower extremity - 4/5 plantar flexion, 2/5 dorsi flexion and EHL, otherwise 0/5 at the knee and hip.   No deficits to pain, soft touch.   Intact perianal sensation  + rectal tone     Reflexes: hypoactive in the lower  extremities        The labs and imaging for this patient have been reviewed                      Component      Latest Ref Rng  2/7/2017    WBC      4.0 - 11.0 10e9/L  9.7    RBC Count      4.4 - 5.9 10e12/L  3.34 (L)    Hemoglobin      13.3 - 17.7 g/dL  9.4 (L)    Hematocrit      40.0 - 53.0 %  28.9 (L)    MCV      78 - 100 fl  87    MCH      26.5 - 33.0 pg  28.1    MCHC      31.5 - 36.5 g/dL  32.5    RDW      10.0 - 15.0 %  16.4 (H)    Platelet Count      150 - 450 10e9/L  212    Diff Method        Automated Method    % Neutrophils        83.5    % Lymphocytes        7.7    % Monocytes        8.3    % Eosinophils        0.1    % Basophils        0.1    % Immature Granulocytes        0.3    Nucleated RBCs      0 /100  0    Absolute Neutrophil      1.6 - 8.3 10e9/L  8.1    Absolute Lymphocytes      0.8 - 5.3 10e9/L  0.7 (L)    Absolute Monocytes      0.0 - 1.3 10e9/L  0.8    Absolute Eosinophils      0.0 - 0.7 10e9/L  0.0    Absolute Basophils      0.0 - 0.2 10e9/L  0.0    Abs Immature Granulocytes      0 - 0.4 10e9/L  0.0    Absolute Nucleated RBC        0.0    Sodium      133 - 144 mmol/L  136    Potassium      3.4 - 5.3 mmol/L  3.7    Chloride      94 - 109 mmol/L  99    Carbon Dioxide      20 - 32 mmol/L  25    Anion Gap      3 - 14 mmol/L  12    Glucose      70 - 99 mg/dL  206 (H)    Urea Nitrogen      7 - 30 mg/dL  15    Creatinine      0.66 - 1.25 mg/dL  0.75    GFR Estimate      >60 mL/min/1.7m2  >90 . . .    GFR Estimate If Black      >60 mL/min/1.7m2  >90 . . .    Calcium      8.5 - 10.1 mg/dL  8.8           Component      Latest Ref Rng  2/8/2017    PTT      22 - 37 sec  33    INR      0.86 - 1.14  1.01                                                   MRI T spine:  IMPRESSION: Infectious spondylitis at T9-T10 with paraspinal  abscesses, retrolisthesis, epidural inflammation or disc material  resulting in moderate to severe stenosis with cord impingement.    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     - OR tonight for T9-10 laminectomy - higinio and consent on arrival  - stat type and screen - give 1U platelets now  - TLSO brace  - CT T spine post op  - hold ASA and plavix    - admit to ICU post operatively for MAP goals >70  - follow up blood cultures; ID consult  - q1 hour neuro checks  - Bowel regimen  - PRN pain meds/antiemetics  - PT/OT    - SCDs      John (Jack) M. Leschke, M.D.

## 2017-02-09 NOTE — ANESTHESIA POSTPROCEDURE EVALUATION
Patient: Tad Manning    Procedure(s):  9-10 Thoracic Laminectomy with drainage of disc space abcess - Wound Class: IV-Dirty or Infected    Diagnosis:Thoracic Osteomyelitis/Discitis with Epidural Abscess and Spinal Cord Compression  Diagnosis Additional Information: No value filed.    Anesthesia Type:  General, ETT    Note:  Anesthesia Post Evaluation    Patient location during evaluation: PACU  Patient participation: Able to fully participate in evaluation  Level of consciousness: awake  Pain management: adequate  Airway patency: patent  Cardiovascular status: acceptable  Respiratory status: acceptable  Hydration status: acceptable  PONV: none     Anesthetic complications: None          Last vitals:  There were no vitals filed for this visit.      Electronically Signed By: Denton Arreola MD  February 9, 2017  4:18 AM

## 2017-02-09 NOTE — PHARMACY-VANCOMYCIN DOSING SERVICE
Pharmacy Vancomycin Initial Note  Date of Service 2017  Patient's  1940  76 year old, male    Indication: Osteomyelitis    Current estimated CrCl = Estimated Creatinine Clearance: 99.2 mL/min (based on Cr of 0.75).    Creatinine for last 3 days  2017:  6:55 PM Creatinine 0.75 mg/dL    Recent Vancomycin Level(s) for last 3 days  No results found for requested labs within last 3 days.      Vancomycin IV Administrations (past 72 hours)      No vancomycin orders with administrations in past 72 hours.                Nephrotoxins and other renal medications (Future)    Start     Dose/Rate Route Frequency Ordered Stop    17 193  vancomycin (VANCOCIN) 1500 mg in 0.9% NaCl 250 mL PREMIX      1,500 mg  over 60 Minutes Intravenous EVERY 12 HOURS 17 1849      17 184  piperacillin-tazobactam (ZOSYN) 3.375 g vial to attach to  mL bag     Comments:  Pharmacy can adjust dose based on renal function.    3.375 g  over 1 Hours Intravenous EVERY 6 HOURS 17 1846            Contrast Orders - past 72 hours (72h ago through future)    Start     Dose/Rate Route Frequency Ordered Stop    17 180  gadobutrol (GADAVIST) injection 8 mL      8 mL Intravenous ONCE 17 1805 17 180                Plan:  1.  Start vancomycin  1500 mg IV q12h.   2.  Goal Trough Level: 15-20 mg/L   3.  Pharmacy will check trough levels as appropriate in 1-3 Days.    4. Serum creatinine levels will be ordered daily for the first week of therapy and at least twice weekly for subsequent weeks.    5. [place] method utilized to dose vancomycin therapy: Method 1    Migel Genao

## 2017-02-09 NOTE — DISCHARGE SUMMARY
[facility]    Discharge Summary  Hospitalist    Date of Admission:  2/7/2017  Date of Discharge:  2/8/2017  Discharging Provider: Ayse Scott PA-C  Date of Service (when I saw the patient): 02/08/2017    Discharge Diagnoses  Discitis with paraspinal abscess  Spinal cord impingement  Weakness bilateral LE R>L  Coronary artery disease s/p CAGB 1995  Peripheral vascular disease  Type 2 Diabetes Mellitus  Hypertension  History of CLL reportedly in remission  HLD      History of Present Illness  Tad Manning is an 76 year old male with a past medical history significant for chronic back pain, frequent falls, coronary artery disease s/p remote CABG, T2DM, history of CLL, PVD on asa and plavix who presented for evaluation after a fall at home. The patient reported he was ambulating to the bathroom with his walker and tripped landing on his buttcks. He noted increasing pain and increased difficulty with mobility prompting evaluation. He noted progressive lower extremity weakness R>L for the past one to two weeks at time of admission which he states had resulted in multiple falls. Xrays of lumbar spine and pelvis were obtained and were unremarkable. Patient was admitted under observation status for further monitoring. Please see H&P by Dr. Beckford for additional information.     Notably the patient was admitted to [facility] from 1/20/17-1/25/17 for acute renal failure and syncope thought secondary to orthostatic hypotension. He also reported back pain during that admission and reported a fall at home in early January.     On evening of discharge patient noted to have decreased strength in bilateral lower extremities compared to exam performed earlier this afternoon. He reports movement in his right leg was minimal day of admission but he is now unable to move his RLE at all. He reports that sensation is intact. Denies difficulty with urinary or incontinence. Denies saddle anesthesia. He has no other  complaints and denies CP, SOB, nausea vomiting, visual changes. Continues to have low back pain currently controlled with medicaiton.     Hospital Course  Tad Manning was admitted on 2/7/2017.  The following problems were addressed during his hospitalization:    Discitis with paraspinal abscess resulting in moderate to severe stenosis with cord impingement. Patient presented with frequent falls and lower extremity weakness R>L. Xrays of lumbar spine and pelvis were obtained and unremarkable. MRI was obtained due to worsening LE weakness which showed infectious spondylitis T9-T10 with paraspinal abscess, retrolisthesis, epidural inflammation or disc material resulting in moderate to severe stenosis with cord impingement. This evening patient is completely unable to move his RLE, per report at time of neuro exam at 1600 dorsiflexion and plantar flexion still possible though reduced strength. LLE also with progressive weakness. LLE dorsi and plantar flexion +4/5 at at time of transfer, unable to flex hip. Vital signs are stable, patient afebrile.   -- Case was discussed with Dr. Armstrong of Neurosurgery as well as Dr. Bullock of Interventional Radiology.  Per discussion with specialist due to anterior location of the abscess our IR team would be unable to obtain cultures. Neurosurgery team recommended that patient be transferred to the [facility] urgently as he may require surgical intervention tonight. Not recommending steroids prior to transfer. I discussed the case with Dr. Trent of Neurosurgery at the [facility] who graciously accepted the patient for transfer.   --case was discussed with Dr. Diaz of Infectious disease who recommended obtaining blood cultures now and 6 hours after the first set (next set due at 0145). Recommend holding off on antibiotics until local cultures obtained unless patient becomes febrile (she recommends vancomycin) or blood cultures return positive.   --  patient is on asa and plavix for PVD, last dose was 2/7/17. *unable to give platelets prior to transfer due to time constraint*  -- last po intake was 2000 on 2/8    Type 2 Diabetes Mellitus with neuropathy.  PTA regimen includes glipizide 5 mg bid, metformin 1000 mg bid, Lantus 10 units q hs and Victoza 1.2 mg daily. Last A1c from 1/21/17 was 7.0.   -- patient's PTA metformin held during admission  --he was continued on Victoza, glipizide, and lantus  --lantus reduced to 5 units this pm as patient NPO  --SSI    Essential hypertension with h/o orthostatic hypotension: During most recent hospitalization patient had recurrent c/o dizziness and had issues with orthostatic hypotension. He was placed on midodrine as well as compression stockings  - held midodrine during admission  -Continued PTA atenolol    Cardiac and coronary artery disease and hypertension and hyperlipidemia: Patient with history of triple bypass in 1995 per report, as well as hypertension, hyperlipidemia, peripheral vascular disease, coronary artery disease. No CP during admission.    - ACE, Lasix and Norvasc were d/c'd last hospitalization in the setting of ARF and orthostatic hypotension  - Continued on BB, statin. Last dose of aspirin and plavix 2/7/17    PVD s/p LLE bypass and bilateral LE stents in 2014.   - DP pulses palpated. No signs of ischemia  - Continued statin  - asa and plavix held 2/8      H/o CLL: Patient with history of chronic lymphocytic leukemia in remission per report. Followed by Dr England as outpatient. Platelets WNL on 2/7, patient with hgb of 9.4.   - Was evaluated by oncology last hospitalization due to anemia and thrombocyopenia. No further work-up was recommended    BPH: PTA flomax continued     Ayse Scott PA-C    Significant Results and Procedures  See below     Pending Results  These results will be followed up by [facility] team  Unresulted Labs Ordered in the Past 30 Days of this Admission     Date and Time Order  Name Status Description    2/8/2017 1840 Blood culture In process     2/8/2017 1840 Blood culture In process           Code Status  Full Code       Primary Care Physician  Gene Guevara    Physical Exam  Temp: 97.8  F (36.6  C) Temp src: Oral BP: 119/46 mmHg Pulse: 89   Resp: 18 SpO2: 98 % O2 Device: None (Room air)    Filed Vitals:    02/07/17 1452   Weight: 81.194 kg (179 lb)     Vital Signs with Ranges  Temp:  [97.8  F (36.6  C)-99.2  F (37.3  C)] 97.8  F (36.6  C)  Pulse:  [85-93] 89  Resp:  [16-18] 18  BP: (119-143)/(46-69) 119/46 mmHg  SpO2:  [98 %] 98 %  I/O last 3 completed shifts:  In: -   Out: 200 [Urine:200]    Constitutional: Alert and oriented, resting comfortably in bed. Pleasantly conversant.   ENT: normal cephalic, moist mucous membranes  Respiratory: Lungs clear to auscultation bilaterally, no increased work of breathing  Cardiovascular: Regular rate and rhythm, no murmur is appreciated  Skin/Integumen: warm, dry  MSK:  Strength in bilateral UE is +5/5 and equal. LLE with +4/5 dorsi/plantar flexion. Patient unable to flex right or left hip. RLE with complete paralysis.   Neuro: strength testing as above. sensation is intact in bilateral lower extremities. Cranial nerves 2-12 are grossly intact. Tongue midline. Face symmetric.      Discharge Disposition  Transferred to [facility]  Condition at discharge: Satisfactory, pt stable but requiring ASAP Neurosurgery intervention    Consultations This Hospital Stay  SOCIAL WORK IP CONSULT  PHYSICAL THERAPY ADULT IP CONSULT  OCCUPATIONAL THERAPY ADULT IP CONSULT  NUTRITION SERVICES ADULT IP CONSULT  PHARMACY FALL RISK MEDICATION ASSESS IP CONSULT  NUTRITION SERVICES ADULT IP CONSULT  INFECTIOUS DISEASES IP CONSULT  NEUROSURGERY IP CONSULT  PHARMACY TO DOSE VANCO    Time Spent on This Encounter  I, Ayse Scott, personally saw the patient today and spent greater than 30 minutes discharging this patient.    >90 minutes was spent in coordination  of care    Discharge Orders  No discharge procedures on file.  Discharge Medications  Current Discharge Medication List      CONTINUE these medications which have NOT CHANGED    Details   GLIPIZIDE PO Take 5 mg by mouth 2 times daily (before meals)      insulin glargine (LANTUS) 100 UNIT/ML injection Inject 10 Units Subcutaneous At Bedtime      lidocaine (LIDODERM) 5 % Patch Place 1 patch onto the skin daily as needed for moderate pain to back for back pain      METFORMIN HCL PO Take 1,000 mg by mouth 2 times daily (with meals)      atenolol (TENORMIN) 25 MG tablet Take 1 tablet (25 mg) by mouth daily  Qty: 30 tablet, Refills: 0    Associated Diagnoses: Essential hypertension      midodrine (PROAMATINE) 2.5 MG tablet Take 1 tablet (2.5 mg) by mouth 2 times daily  Qty: 60 tablet, Refills: 0    Associated Diagnoses: Orthostatic hypotension      traMADol (ULTRAM) 50 MG tablet Take 1 tablet (50 mg) by mouth every 6 hours as needed  Qty: 40 tablet, Refills: 0    Associated Diagnoses: Acute midline thoracic back pain      polyethylene glycol (MIRALAX/GLYCOLAX) Packet Take 17 g by mouth daily as needed for constipation  Qty: 7 packet, Refills: 0    Associated Diagnoses: Constipation, unspecified constipation type      senna-docusate (SENOKOT-S;PERICOLACE) 8.6-50 MG per tablet Take 1-2 tablets by mouth 2 times daily as needed (constipation )  Qty: 100 tablet, Refills: 0    Associated Diagnoses: Constipation, unspecified constipation type      bacitracin-polymyxin b (POLYSPORIN) ointment Apply topically 2 times daily  Qty: 15 g, Refills: 0    Associated Diagnoses: Ulcer of heel and midfoot, right, with unspecified severity (H)      tamsulosin (FLOMAX) 0.4 MG 24 hr capsule Take 0.8 mg by mouth At Bedtime       liraglutide (VICTOZA) 18 MG/3ML soln Inject 1.2 mg Subcutaneous daily       ZOLPIDEM TARTRATE PO Take 5 mg by mouth nightly as needed       clopidogrel (PLAVIX) 75 MG tablet Take 1 tablet (75 mg) by mouth daily  Qty: 90  tablet, Refills: 3    Associated Diagnoses: Critical lower limb ischemia      Cholecalciferol (VITAMIN D PO) Take 1,000 Units by mouth daily.       atorvastatin (LIPITOR) 40 MG tablet Take 1 tablet by mouth daily.  Qty: 30 tablet, Refills: 1    Associated Diagnoses: Claudication of left lower extremity (H)      Ascorbic Acid (VITAMIN C PO) Take 500 mg by mouth daily.      Nitroglycerin (NITROQUICK SL) Place 0.4 mg under the tongue as needed.      order for DME Equipment being ordered: DH2 shoe  Qty: 1 Device, Refills: 0    Associated Diagnoses: 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); Diabetic ulcer of left foot due to type 2 diabetes mellitus (H)      ASPIRIN PO Take 81 mg by mouth daily.           Allergies  Allergies   Allergen Reactions     Blood Transfusion Related (Informational Only) Other (See Comments)     Patient has a history of a clinically significant antibody against RBC antigens.  A delay in compatible RBCs may occur.      Data  Most Recent 3 CBC's:  Recent Labs   Lab Test  02/07/17   1855  01/24/17   0719  01/23/17   0659   WBC  9.7  6.8  5.9   HGB  9.4*  8.6*  8.2*   MCV  87  85  86   PLT  212  131*  115*      Most Recent 3 BMP's:  Recent Labs   Lab Test  02/07/17   1855  01/23/17   0659  01/22/17   0720   NA  136  135  137   POTASSIUM  3.7  3.8  4.0   CHLORIDE  99  103  105   CO2  25  23  23   BUN  15  19  32*   CR  0.75  1.01  1.31*   ANIONGAP  12  9  9   JUSTINO  8.8  8.5  8.4*   GLC  206*  184*  144*     Most Recent 2 LFT's:  Recent Labs   Lab Test  01/21/17   0740  01/20/17   2040   AST  11  10   ALT  23  26   ALKPHOS  101  100   BILITOTAL  0.7  0.6     Most Recent INR's and Anticoagulation Dosing History:  Anticoagulation Dose History     Recent Dosing and Labs Latest Ref Rng 3/11/2013 4/15/2013 1/9/2014 1/10/2014 5/23/2014 2/8/2017    INR 0.86 - 1.14 0.92 0.89 0.95 0.95 0.91 1.01        Most Recent 3 Troponin's:  Recent Labs   Lab Test  02/07/17   1855   TROPI  <0.015  The 99th  percentile for upper reference range is 0.045 ug/L.  Troponin values in   the range of 0.045 - 0.120 ug/L may be associated with risks of adverse   clinical events.       Most Recent Cholesterol Panel:  Recent Labs   Lab Test  05/23/14   0745   CHOL  134   LDL  48   HDL  34*   TRIG  257*     Most Recent 6 Bacteria Isolates From Any Culture (See EPIC Reports for Culture Details):  Recent Labs   Lab Test  01/10/14   1507   CULT  Moderate growth Prevotella species Beta lactamase positive Moderate growth Strain 2 Prevotella species Susceptibility testing not routinely done*  Heavy growth Enterobacter species Moderate growth Coagulase negative Staphylococcus Susceptibility testing not  routinely done Plus normal skin himanshu.*     Most Recent TSH, T4 and A1c Labs:  Recent Labs   Lab Test  01/21/17   0740   A1C  7.0*     Results for orders placed or performed during the hospital encounter of 02/07/17   Pelvis XR, 1-2 views    Narrative    PELVIS ONE VIEW   2/7/2017 4:30 PM     HISTORY: Fall. Pain.    COMPARISON: None.      Impression    IMPRESSION: The right proximal femur appears rotated internally, and a  right femoral neck fracture cannot be excluded, although no definite  fracture line is seen. This finding could be related to patient  rotation on this exam. Please clinically correlate. Consider CT for  further evaluation. Arterial calcification.    HUNTER GALLARDO MD   Lumbar spine XR, 2-3 views    Narrative    LUMBAR SPINE TWO VIEWS   2/7/2017 4:30 PM     HISTORY: Fall. Pain.    COMPARISON: None.      Impression    IMPRESSION: Mild degenerative changes are noted in the lumbar spine.  No evidence for acute fracture or dislocation. Vascular  calcifications. Bilateral iliac vascular stents are noted.    HUNTER GALLARDO MD   CT Pelvis w/o Contrast    Narrative    CT PELVIS WITHOUT CONTRAST 2/7/2017 6:13 PM     COMPARISON: Two view right hip 2/7/2017.    HISTORY: Fall, pain.    TECHNIQUE: Thin-section axial non-contrast  CT images of the pelvis and  right hip were acquired. Multiplanar reconstructions were created.      Impression    IMPRESSION: Bilateral hip joint alignment is normal. There are no  fractures of the pelvis or proximal femurs on either side.      Radiation dose for this scan was reduced using automated exposure  control, adjustment of the mA and/or kV according to patient size, or  iterative reconstruction technique    THUY WALTON MD   MR Thoracic Spine w/o & w Contrast     Value    Radiologist flags Infectious spondylitis with cord impingement. (AA)    Narrative    MR THORACIC SPINE WITHOUT AND WITH CONTRAST  2/8/2017 6:18 PM     HISTORY: Upper back pain    TECHNIQUE: Multiplanar multisequence images were obtained through the  thoracic spine without and with contrast. 8 mL of Gadavist given.    COMPARISON: None.    FINDINGS: There is abnormal high signal intensity within the T9-T10  disc space with adjacent bony erosion and abnormal marrow signal.  There is also enhancing paraspinal soft tissue thickening at this same  level. There is associated retrolisthesis of T9 on T10 of  approximately 3 mm with abnormal disc and/or epidural process in the  anterior epidural space. The combination of findings is causing  moderate to severe central canal stenosis and some cord deformity.  Findings are consistent with an infectious discitis/osteomyelitis.  There is no cord edema at this time. Postcontrast images show  enhancement of the abnormality with the exception of the fluidlike  material within the disc space and eroding into the T9 and T10  vertebral bodies. Some of this fluid density also extends in the right  paraspinal space and this is consistent with abscess. This measures  approximately 3.0 x 2.0 x 1.4 cm. There are also smaller abscesses in  the left paraspinal space measuring less than 1 cm in sizes.      Impression    IMPRESSION: Infectious spondylitis at T9-T10 with paraspinal  abscesses, retrolisthesis,  epidural inflammation or disc material  resulting in moderate to severe stenosis with cord impingement.    [Critical Result: Infectious spondylitis with cord impingement.]    Finding was identified on 2/8/2017 6:24 PM.     The referring clinician, [place] Joseph, was contacted by me on  2/8/2017 6:30 PM and verbalized understanding of the critical result.     CATHERINE RIVERA MD

## 2017-02-09 NOTE — ANESTHESIA PROCEDURE NOTES
Arterial Line Procedure Note  Staff:     Anesthesiologist:  HARRISON LIRA    Resident/CRNA:  PEDRO PABLO GARDUNO    Arterial line performed by resident/CRNA in presence of a teaching physician    Location: In OR After Induction  Procedure Start/Stop Times:     patient identified, IV checked, site marked, risks and benefits discussed, informed consent, monitors and equipment checked, pre-op evaluation and at physician/surgeon's request      Correct Patient: Yes      Correct Position: Yes      Correct Site: Yes      Correct Procedure: Yes      Correct Laterality:  Yes    Site Marked:  Yes  Line Placement:     Procedure:  Arterial Line    Insertion Site:  Radial    Insertion laterality:  Left    Skin Prep: Chloraprep      Patient Prep: mask, sterile gloves, hat and hand hygiene      Local skin infiltration:  None    Ultrasound Guided?: Yes      Artery evaluated via ultrasound confirming patency.   Using realtime imaging, the artery was punctured and the needle was observed entering the artery.      A permanent image is entered into patient's chart.      Catheter size:  20 gauge, Quick cath    Cath secured with: suture      Dressing:  Tegaderm    Complications:  None obvious    Arterial waveform: Yes      IBP within 10% of NIBP: Yes

## 2017-02-09 NOTE — CONSULTS
[facility], [place]    Neuro Critical Care Consultation   Tad Manning MRN# [id number]   YOB: 1940 Age: 76 year old       Date of Admission:  2/9/2017  Date of Consult (When I saw the patient): 02/09/2017      Assessment and Plan  Tad Manning is a 76 year old male who was admitted on 2/9/2017. I was asked by neurosurgery to see the patient for medical management while patient continues to require ICU care.    NEURO: s/p mechanical fall, BLE weakness and findings of spinal cord compression at the level of T9/T10 due to epidural abscess.  #POD 0 - T9-10 laminectomy transpedicular disc space abscess evacuation.  - pain control per neurosurgery orders  - Q2 hour neuro checks  - MAP > 70    PULM: no acute issues, stable on supplemental O2  - encourage Incentive spirometry  - elevate HOB as able per neurosurgery activity recommendations.  - continuous pulse oximetry while in ICU  - Goal SpO2 > 92%  - supplemental O2 prn to maintain oxygenation goals    CV: no acute issues, h/o hypertension on his PTA atenolol  - MAP > 70, no current pressor requirements.  - continuous cardiac monitoring while in ICU    GI: no acute issues.  - ADAT.  -GI prophylaxis as indicated.      RENAL: No acute issues.  - mIVF as ordered, may TKO once taking adequate PO  - Electrolyte protocols while in the ICU  - I/O monitoring    ENDOCRINE: h/o DM2 on home insulin regimen  - resume home insulin once taking PO.    - consider insulin infusion if unable to control BG with SSI or if patient not taking adequate PO.  -Goal is normoglycemia.    HEME/ID: Vanc/Zosyn empiric coverage.  ID consulted for recommendations.  - Appreciate ID recs  - Continue Vanc/Zosyn as ordered.  - Continue to monitor for infectious complications.    - monitor hgb after 2 unit PRBC transfusion today.    PROPHYLAXIS:   -DVT prophylaxis with pneumo-boots.   Chemical DVT prophylaxis contraindicated in acute post operative  phase of care.  -GI prophylaxis with protonix as ordered.       Tracy Cottrell [facility]    Code Status   Prior    Reason for Consult  Reason for consult: I was asked by neurosurgery to evaluate this patient for medical management during ICU stay.    Primary Care Physician  Gene Guevara    Chief Complaint  S/p fall with BLE weakness.    History is obtained from the patient and electronic health record    History of Present Illness  Tad Manning is a 76 year old male with history of DM2, HTN, HLD, CAD, CKD, lower extremity bypass grafts, CLL in remission (chemo last in 2015), BPH who presented to [facility] after sustaining a mechanical fall on 2/8.  After falling patient had difficulty getting up and was unable to ambulate.  Right leg weakness was worse than left leg weakness at the time of the fall.      Patient states he had no warning of this weakness and had been able to ambulate without assistance at baseline.  He stated that he does not remember the circumstances around the fall, but states he remembers that he did not feel dizzy or confused and he did not hit his head.  He presented to the emergency department where MRI of the spine was obtained, findings of a T9-10 infectious spondylitis with severe canal stenosis and cord impingement prompted emergent plans for OR evacuation of abscesses.  He underwent surgery without complication and was admitted to the ICU for close neurological functioning.    He denies headache, nausea, vomiting, arm weakness or difficulty breathing today and reports tolerable post-surgical back pain at 3/10 pain rating.  He denies numbness or tingling in his lower extremities but assessment reveals weakness as noted below.  No upper extremity weakness.    Past Medical History  I have reviewed this patient's medical history and updated it with pertinent information if needed.   Past Medical History   Diagnosis Date     Coronary artery disease      triple bypass 1995     Diabetes mellitus  (H)      History of blood transfusion      Hypertension      Malignant neoplasm (H)      CLL     Hyperlipidaemia      Sebaceous cyst      BMI 30.0-30.9,adult      BPH (benign prostatic hypertrophy)      Cellulitis      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)      Noninflammatory pericardial effusion      Osteomyelitis of left foot (H)      PVD (peripheral vascular disease) (H)      Diabetic polyneuropathy (H)      ASCVD (arteriosclerotic cardiovascular disease)      Arthritis        Past Surgical History  I have reviewed this patient's surgical history and updated it with pertinent information if needed.  Past Surgical History   Procedure Laterality Date     Tonsillectomy       Bone marrow biopsy, bone specimen, needle/trocar  10/19/2012     Procedure: BIOPSY BONE MARROW;  BONE MARROW BIOPSY  (CONSCIOUS SED);  Surgeon: Ramon Quesada MD;  Location:  GI     Vascular surgery       ptcr legs     Bypass graft femoropopliteal  1/10/2014     Procedure: BYPASS GRAFT FEMOROPOPLITEAL;  Left above knee popliteal to  Below knee popliteal bypass using transverse saphenous vein graft. Left 2nd Toe amputation;  Surgeon: Amador Vick MD;  Location:  OR     Amputate toe(s)  1/10/2014     Procedure: AMPUTATE TOE(S);;  Surgeon: Amador Vick MD;  Location:  OR     Graft vein from extremity (location)  1/10/2014     Procedure: GRAFT VEIN FROM EXTREMITY (LOCATION);;  Surgeon: Amador Vick MD;  Location:  OR     Cardiac surgery       angioplasty with stent, triple bypass     Excise cyst generic (location) N/A 2/1/2016     Procedure: EXCISE CYST GENERIC (LOCATION);  Surgeon: Amador Vick MD;  Location:  SD       Prior to Admission Medications  Prior to Admission Medications   Prescriptions Last Dose Informant Patient Reported? Taking?   Ascorbic Acid (VITAMIN C PO)  Self Yes No   Sig: Take 500 mg by mouth daily.   Cholecalciferol (VITAMIN D PO)  Self Yes No    Sig: Take 1,000 Units by mouth daily.    Nitroglycerin (NITROQUICK SL)  Self Yes No   Sig: Place 0.4 mg under the tongue as needed.   ZOLPIDEM TARTRATE PO  Self Yes No   Sig: Take 5 mg by mouth nightly as needed    atenolol (TENORMIN) 25 MG tablet  Self No No   Sig: Take 1 tablet (25 mg) by mouth daily   atorvastatin (LIPITOR) 40 MG tablet  Self No No   Sig: Take 1 tablet by mouth daily.   bacitracin-polymyxin b (POLYSPORIN) ointment  Self No No   Sig: Apply topically 2 times daily   insulin aspart (NOVOLOG PEN) 100 UNIT/ML injection   No No   Sig: Inject 1-6 Units Subcutaneous every 4 hours   insulin glargine (LANTUS) 100 UNIT/ML injection   No No   Sig: Inject 5 Units Subcutaneous At Bedtime   lidocaine (LIDODERM) 5 % Patch  Self Yes No   Sig: Place 1 patch onto the skin daily as needed for moderate pain to back for back pain   order for DME  Self No No   Sig: Equipment being ordered: 2 shoe   oxyCODONE (ROXICODONE) 5 MG IR tablet   No No   Sig: Take 1-2 tablets (5-10 mg) by mouth every 3 hours as needed for moderate to severe pain   polyethylene glycol (MIRALAX/GLYCOLAX) Packet  Self No No   Sig: Take 17 g by mouth daily as needed for constipation   senna-docusate (SENOKOT-S;PERICOLACE) 8.6-50 MG per tablet  Self No No   Sig: Take 1-2 tablets by mouth 2 times daily as needed (constipation )   tamsulosin (FLOMAX) 0.4 MG 24 hr capsule  Self Yes No   Sig: Take 0.8 mg by mouth At Bedtime       Facility-Administered Medications: None     Allergies  Allergies   Allergen Reactions     Blood Transfusion Related (Informational Only) Other (See Comments)     Patient has a history of a clinically significant antibody against RBC antigens.  A delay in compatible RBCs may occur.        Social History  I have reviewed this patient's social history and updated it with pertinent information if needed. Tad Manning  reports that he quit smoking about 21 years ago. His smoking use included Cigarettes. He has a 60  pack-year smoking history. He has never used smokeless tobacco. He reports that he drinks alcohol. He reports that he does not use illicit drugs.    Family History  I have reviewed this patient's family history and updated it with pertinent information if needed.   Family History   Problem Relation Age of Onset     CANCER Mother      leukemia       Review of Systems  C: NEGATIVE for fever, chills, change in weight  I: NEGATIVE for worrisome rashes, moles or lesions  E: NEGATIVE for vision changes or irritation  E/M: NEGATIVE for ear, mouth and throat problems  R: NEGATIVE for significant cough or SOB  B: NEGATIVE for masses, tenderness or discharge  CV: NEGATIVE for chest pain, palpitations or peripheral edema  GI: NEGATIVE for nausea, abdominal pain, heartburn, or change in bowel habits  : NEGATIVE for frequency, dysuria, or hematuria  M: NEGATIVE for significant arthralgias or myalgia  N: NEGATIVE for weakness, dizziness or paresthesias  E: NEGATIVE for temperature intolerance, skin/hair changes  H: NEGATIVE for bleeding problems  P: NEGATIVE for changes in mood or affect    Physical Exam  Temp: 97.9  F (36.6  C) Temp src: Axillary BP: 135/83 mmHg Pulse: 91 Heart Rate: 85 Resp: 18 SpO2: 99 % O2 Device: None (Room air) Oxygen Delivery: 2 LPM  Vital Signs with Ranges  Temp:  [97.5  F (36.4  C)-99.2  F (37.3  C)] 97.9  F (36.6  C)  Pulse:  [85-91] 91  Heart Rate:  [75-97] 85  Resp:  [14-18] 18  BP: (119-159)/(46-88) 135/83 mmHg  MAP:  [75 mmHg-96 mmHg] 78 mmHg  Arterial Line BP: (128-164)/(49-63) 128/50 mmHg  SpO2:  [94 %-100 %] 99 %  0 lbs 0 oz    Heart Rate: 85, Blood pressure 135/83, pulse 91, temperature 97.9  F (36.6  C), temperature source Axillary, resp. rate 18, SpO2 99 %.  0 lbs 0 oz    HEENT:  Normocephalic, atraumatic.  PERRLA.  EOM s intact.  Nares are clear.  Mouth without lesions.  Neck:  Supple, non-tender, without lymphadenopathy.  Heart:  Regular rate and rhythm without murmur, rub or  gallop.  Lungs:  Clear to auscultation bilaterally.  No wheezes, rhonchi or rales.  Abdomen:  Soft, non-tender, non-distended.  Normal bowel sounds.  Skin:  Warm and dry, good capillary refill. Incision on back c/d/i with bandage intact.  Extremities:  Good radial and dorsalis pedis pulses bilaterally, no edema, cyanosis or clubbing.    NEUROLOGICAL EXAMINATION:   MENTAL STATUS:   The patient was alert and oriented to person, place, time and purpose. Registration and recall intact. No difficulty with concentration.    CRANIAL NERVES: PERRL. EOMI; no nystagmus.  Full visual fields.  Trapezius and sternocloidomastoid are full strength. Tongue was midline and protrudes midline. Uvula was midline and raises midline. Facial sensation was intact to pain and light touch at all distributions. No speech disturbance. Hearing intact to conversation and whisper.     MOTOR:   Shoulder Abduction:  Right:  5/5   Left:  5/5  Biceps:                      Right:  5/5   Left:  5/5  Triceps:                     Right:  5/5   Left:  5/5  Wrist Extensors:       Right:  5/5   Left:  5/5  Wrist Flexors:           Right:  5/5   Left:  5/5  Intrinsics:                   Right:  5/5   Left:  5/5   Hip Flexor:                Right: 1/5  Left:  2/5  Hip Adductor:             Right:  1/5  Left:  2/5  Hip Abductor:             Right:  1/5  Left:  2/5  Gastroc Soleus:        Right:  1/5  Left:  4/5  Tib/Ant:                      Right:  1/5  Left:  2/5  EHL:                          Right:  0/5  Left:  2/5    SENSORY:  Sensation intact to pain and light touch at all distributions.   No neglect.    REFLEXES: 2+ throughout.  There was no clonus present.  Toes down-going.    CEREBELLAR FUNCTIONING: No difficulty with finger-to-nose, finger tapping and heel-to-shin tasks. No dysmetria observed.     STATION AND GAIT: Patient without difficulty walking.      Psych:   Euthymic affect. Thought content unremarkable.    Data  All new lab and imaging data  was personally reviewed by me.  CT:CT thoracic spine 2/9/17    1. New postoperative changes of T9 and T10 decompressive laminectomy  with improved patency of the spinal canal. Stable bilateral foraminal  narrowing..     2. Stable changes of T9-10 discitis/osteomyelitis.  CBC RESULTS:   Recent Labs   Lab Test  02/09/17   0605   WBC  8.6   RBC  2.62*   HGB  7.2*   HCT  22.6*   MCV  86   MCH  27.5   MCHC  31.9   RDW  16.6*   PLT  179     Basic Metabolic Panel:  NA      133   2/9/2017   POTASSIUM      3.7   2/9/2017  CHLORIDE       99   2/9/2017  JUSTINO      8.3   2/9/2017  CO2       23   2/9/2017  BUN       18   2/9/2017  CR     0.81   2/9/2017  GLC      224   2/9/2017  INR:  INR     1.25   2/9/2017  INR     1.01   2/8/2017      I have discussed the following assessment and plan with the Dr. Barnes who is in agreement with initial plan and will follow up with further consultation recommendations.    Tracy Richard MSN, [facility]  NeuroCritical Care   Pager: [phone]

## 2017-02-09 NOTE — CONSULTS
[facility] ID SERVICE: NEW CONSULTATION     Patient:  Tad Manning, Date of birth 1940, Medical record number [id number]  Date of Visit:  February 9, 2017         Assessment and Recommendations:   Problem List:  1. T9-10 discitis/osteomyelitis with epidural abscess s/p laminectomy/abscess evacuation  2. GPC bacteremia  3. DM with neuropathy and chronic R foot ulcer  4. PVD with bilateral LE stents on ASA/plvx  5. Chronic lymphocytic leukemia in remission (last chemo in Sept 2015)    Recommendations:  -Obtain repeat blood cultures (ordered for you).  -Continue vancomycin; transition zosyn to meropenem 1g Q8H for better CNS penetration.   -Await speciation of blood/intraoperative cultures; anticipate will be able to narrow antibiotics at that time.      Discussion:  Patient with PMH CLL in remission, DMII, PVD admitted as transfer 2/9/2017 with thoracic back pain and LE weakness - found to have T9-10 discitis with overlying epidural abscess, now s/p laminectomy/abscess evacuation with intraoperative cultures with NGTD but blood cultures showing GPCs in pairs/chains. Overall picture suspicious for gram positive bacteremia (possible dental vs foot ulcer source?) with seeding of spine; at this point would continue empiric vancomycin and transition zosyn to meropenem for better CNS penetration - anticipate will be able to narrow tomorrow once speciation is completed. Anticipate 6 week course of antibiotics but would defer PICC placement until blood cultures clear. Depending on duration of blood culture positivity may need to further evaluate for metastatic infection with TTE, additional imaging etc. but would defer for now.     Thank you for this consult. The [facility] ID team will continue to follow this patient. Please feel free to call with any questions.     Patient discussed with ID attending Dr. Max.   Deborah Herrera MD - [facility] ID fellow  [phone]       History of Present Illness:   Tad  Nigel is a 75 yo male with CAD, DMII, HTN, PVD with BLLE stents on plavix/ASA, CLL in remission (last chemo 9/2015) admitted as transfer from [facility] 2/9/2017 after fall with resultant LE weakness and inability to ambulate. MRI with infectious spondylitis at T9/10 with paraspinal abscess with canal stenosis and cord impingement. He went to the OR overnight and disk space with pam pus; dura closed with nylon.      Of note patient has been seen multiple times in EDs/hospitals for back pain since 12/2016; he thinks the back pain really started in November. No fevers or chills at home. No IVDU. Has an upper partial in place, and he is due for a root cleaning; last dental cleaning was October, no extractions or procedures done. No new dental pain or abscesses. No recent cellulitis or skin openings, no infiltrated IVs he can remember. Does see a podiatrist for chronic R foot ulcer; last debridement last week, no probing to bone. He is feeling a bit better after his surgery but is still having back pain. No CP, SOB, rashes, abdominal pain, diarrhea.          Review of Systems:   10 pt ROS negative except where noted in HPI.        Past Medical History:     Past Medical History   Diagnosis Date     Coronary artery disease      triple bypass 1995     Diabetes mellitus (H)      History of blood transfusion      Hypertension      Malignant neoplasm (H)      CLL     Hyperlipidaemia      Sebaceous cyst      BMI 30.0-30.9,adult      BPH (benign prostatic hypertrophy)      Cellulitis      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)      Noninflammatory pericardial effusion      Osteomyelitis of left foot (H)      PVD (peripheral vascular disease) (H)      Diabetic polyneuropathy (H)      ASCVD (arteriosclerotic cardiovascular disease)      Arthritis          Allergies:      Allergies   Allergen Reactions     Blood Transfusion Related (Informational Only) Other (See Comments)     Patient  has a history of a clinically significant antibody against RBC antigens.  A delay in compatible RBCs may occur.            Recent Antimicrobials::   Vancomycin 2/9-current  Zosyn 2/3-current         Family History:     Family History   Problem Relation Age of Onset     CANCER Mother      leukemia            Social History:     Social History     Social History     Marital Status: Single     Spouse Name: N/A     Number of Children: N/A     Years of Education: N/A     Occupational History     Not on file.     Social History Main Topics     Smoking status: Former Smoker -- 2.00 packs/day for 30 years     Types: Cigarettes     Quit date: 06/19/1995     Smokeless tobacco: Never Used     Alcohol Use: Yes      Comment: 1 drink monthly     Drug Use: No     Sexual Activity: Not on file     Other Topics Concern     Not on file     Social History Narrative    Lives independently with SO. 2/9/2017               Physical Exam:   Ranges forvital signs:  Temp:  [97.8  F (36.6  C)-99.2  F (37.3  C)] 98.1  F (36.7  C)  Pulse:  [85-91] 91  Heart Rate:  [77-97] 77  Resp:  [14-18] 16  BP: (119-159)/(46-88) 135/83 mmHg  MAP:  [82 mmHg-96 mmHg] 83 mmHg  Arterial Line BP: (130-164)/(50-63) 142/50 mmHg  SpO2:  [94 %-100 %] 95 %    Intake/Output Summary (Last 24 hours) at 02/09/17 0956  Last data filed at 02/09/17 0930   Gross per 24 hour   Intake   1811 ml   Output    375 ml   Net   1436 ml       Exam:  GENERAL:  well-developed, well-nourished, lying  bed in no acute distress.   ENT:  Head is normocephalic, atraumatic. Oropharynx is moist without exudates or ulcers; metal partial in place upper teeth, tooth 27 darkened; no erythema or tenderness noted  EYES:  Eyes have anicteric sclerae, no injection  NECK:  Supple, no LAD  LUNGS:  Clear to auscultation.  CARDIOVASCULAR:  Regular rate and rhythm with no murmurs, gallops or rubs.  ABDOMEN:  abd soft, nontender.  EXT: Extremities warm and without edema.  SKIN:  No acute rashes; no stigmata  of endocarditis. No central lines. Spinal drain with bloody fluid. BL groin without erythema. Line is in place without any surrounding erythema.  NEUROLOGIC: AAO x 3, conversational, grasp intact bl, R ankle dorsiflexion/plantarflexion impaired         Laboratory Data:     CREATININE   Date Value Ref Range Status   02/09/2017 0.81 0.66 - 1.25 mg/dL Final   02/07/2017 0.75 0.66 - 1.25 mg/dL Final   01/23/2017 1.01 0.66 - 1.25 mg/dL Final   01/22/2017 1.31* 0.66 - 1.25 mg/dL Final   01/21/2017 2.43* 0.66 - 1.25 mg/dL Final     WBC   Date Value Ref Range Status   02/09/2017 8.6 4.0 - 11.0 10e9/L Final   02/09/2017 8.8 4.0 - 11.0 10e9/L Final   02/07/2017 9.7 4.0 - 11.0 10e9/L Final   01/24/2017 6.8 4.0 - 11.0 10e9/L Final   01/23/2017 5.9 4.0 - 11.0 10e9/L Final     HEMOGLOBIN   Date Value Ref Range Status   02/09/2017 7.2* 13.3 - 17.7 g/dL Final     PLATELET COUNT   Date Value Ref Range Status   02/09/2017 179 150 - 450 10e9/L Final     Lab Results   Component Value Date     02/09/2017    BUN 18 02/09/2017    CO2 23 02/09/2017          Pertinent Recent Microbiology Data:   2/8/17 2/2 BC with GPC pairs/chains  2/9/17 Spinal bone culture in process  2/9/17 Spinal fluid culture in process         Imaging:     MRI Thoracic spine 2/8:  There is abnormal high signal intensity within the T9-T10  disc space with adjacent bony erosion and abnormal marrow signal.  There is also enhancing paraspinal soft tissue thickening at this same  level. There is associated retrolisthesis of T9 on T10 of  approximately 3 mm with abnormal disc and/or epidural process in the  anterior epidural space. The combination of findings is causing  moderate to severe central canal stenosis and some cord deformity.  Findings are consistent with an infectious discitis/osteomyelitis.  There is no cord edema at this time. Postcontrast images show  enhancement of the abnormality with the exception of the fluidlike  material within the disc space and  eroding into the T9 and T10  vertebral bodies. Some of this fluid density also extends in the right  paraspinal space and this is consistent with abscess. This measures  approximately 3.0 x 2.0 x 1.4 cm. There are also smaller abscesses in  the left paraspinal space measuring less than 1 cm in sizes.

## 2017-02-09 NOTE — BRIEF OP NOTE
[facility], [place]    Brief Operative Note    Pre-operative diagnosis: Epidural abscess, osteomyelitis.  Post-operative diagnosis same  Procedure: T9/10 laminectomy, T9 partial transpedicular disc space abscess evacuation  Surgeon: Dr. Trent  Resident: Ileana Floyd  Anesthesia: General   Estimated blood loss: 50cc  Drains: subfacial hemovac  Specimens: Bone sent for path and culture, epidural abscess sent for culture  Findings:   Disc space with pam pus. Dura seemed decompressed after laminectomy, closed with nylon  Complications: None.  Implants: None.

## 2017-02-09 NOTE — PROGRESS NOTES
PA attempting to tx pt to [facility] at this time d/t dx. Per PA, keep pt NPO. Tx to 75 on hold until plan finalized.

## 2017-02-09 NOTE — ANESTHESIA CARE TRANSFER NOTE
Patient: Tad Manning    Procedure(s):  9-10 Thoracic Laminectomy with drainage of disc space abcess - Wound Class: IV-Dirty or Infected    Diagnosis: Thoracic Osteomyelitis/Discitis with Epidural Abscess and Spinal Cord Compression  Diagnosis Additional Information: No value filed.    Anesthesia Type:   General, ETT     Note:  Airway :Face Mask  Patient transferred to:PACU  Comments: TAD MANNING was transferred to PACU, breathing from face mask 6 L O2.    Oxygen saturation 100%  /63  HR 85  RR 12  Pain well controlled, patient disoriented and states he wants to go to the office. Pulling at lines and required restraints in PACU.     Earl Ochoa MD CA1  February 9, 2017 4:26 AM      Vitals: (Last set prior to Anesthesia Care Transfer)    CRNA VITALS  2/9/2017 0347 - 2/9/2017 0425      2/9/2017             Resp Rate (set): 10                Electronically Signed By: Earl Ochoa MD  February 9, 2017  4:25 AM

## 2017-02-09 NOTE — PLAN OF CARE
Problem: Goal Outcome Summary  Goal: Goal Outcome Summary  Outcome: Declining  MRI completed with abnormalities. PA/neurosurgery aware. Pt to transfer to . Strength in right foot decreasing throughout shift. Currently unable to move right extremity/dorsi/plantar flex. Less movement in left foot. Temp 99.2. Oxy/IV Dilaudid for pain. Blood glucoses stable. Unable to get OOB. Mepilex on blanchable coccyx. Voiding adequately. Report called to nurse on 6A. Call out to [facility] for transportation time.     List all goals to be met before discharge home   - Diagnostic tests and consults completed and resulted - not met  - Vital signs normal or at patient baseline -  met  - Tolerating oral intake to maintain hydration - met   - Adequate pain control on oral analgesia - not met  - Tolerating oral antibiotics or has plans for home infusion setup - NA  - Infection is improving - NA  - Return to baseline functional status - not met  - Dyspnea improved and oxygen saturations greater than 88% on room air or prior home oxygen levels - met   - Safe disposition plan has been identified - not met

## 2017-02-09 NOTE — PROGRESS NOTES
S: order received to see patient at [facility] for evalulation for a thoracolumbosacral orthosis (TLSO) as ordered by   O/G: support and stabilize spine with maximum comfort  A: patient seen for eval for a TLSO.   Multiple measurements were taken to have a brace fabricated by [employer].  Patient/staff have been made aware that brace fitting will take place once the brace is received back in our office.  P: patient will be fit with brace when fabrication is complete, likely tomorrow 2-10-17.  Contact orthotics if any issues arise.

## 2017-02-10 ENCOUNTER — APPOINTMENT (OUTPATIENT)
Dept: GENERAL RADIOLOGY | Facility: CLINIC | Age: 77
DRG: 456 | End: 2017-02-10
Attending: NURSE PRACTITIONER
Payer: MEDICARE

## 2017-02-10 LAB
ANION GAP SERPL CALCULATED.3IONS-SCNC: 10 MMOL/L (ref 3–14)
BUN SERPL-MCNC: 14 MG/DL (ref 7–30)
CALCIUM SERPL-MCNC: 8.4 MG/DL (ref 8.5–10.1)
CHLORIDE SERPL-SCNC: 100 MMOL/L (ref 94–109)
CO2 SERPL-SCNC: 25 MMOL/L (ref 20–32)
CREAT SERPL-MCNC: 0.68 MG/DL (ref 0.66–1.25)
CRP SERPL-MCNC: 140 MG/L (ref 0–8)
ERYTHROCYTE [DISTWIDTH] IN BLOOD BY AUTOMATED COUNT: 16 % (ref 10–15)
ERYTHROCYTE [SEDIMENTATION RATE] IN BLOOD BY WESTERGREN METHOD: 65 MM/H (ref 0–20)
GFR SERPL CREATININE-BSD FRML MDRD: ABNORMAL ML/MIN/1.7M2
GLUCOSE SERPL-MCNC: 157 MG/DL (ref 70–99)
HBA1C MFR BLD: 6.1 % (ref 4.3–6)
HCT VFR BLD AUTO: 26.6 % (ref 40–53)
HGB BLD-MCNC: 8.3 G/DL (ref 13.3–17.7)
MCH RBC QN AUTO: 27.2 PG (ref 26.5–33)
MCHC RBC AUTO-ENTMCNC: 31.2 G/DL (ref 31.5–36.5)
MCV RBC AUTO: 87 FL (ref 78–100)
PLATELET # BLD AUTO: 215 10E9/L (ref 150–450)
POTASSIUM SERPL-SCNC: 3.6 MMOL/L (ref 3.4–5.3)
RBC # BLD AUTO: 3.05 10E12/L (ref 4.4–5.9)
SODIUM SERPL-SCNC: 135 MMOL/L (ref 133–144)
WBC # BLD AUTO: 8.8 10E9/L (ref 4–11)

## 2017-02-10 PROCEDURE — 25000132 ZZH RX MED GY IP 250 OP 250 PS 637: Mod: GY | Performed by: STUDENT IN AN ORGANIZED HEALTH CARE EDUCATION/TRAINING PROGRAM

## 2017-02-10 PROCEDURE — 99233 SBSQ HOSP IP/OBS HIGH 50: CPT | Performed by: HOSPITALIST

## 2017-02-10 PROCEDURE — 80048 BASIC METABOLIC PNL TOTAL CA: CPT | Performed by: STUDENT IN AN ORGANIZED HEALTH CARE EDUCATION/TRAINING PROGRAM

## 2017-02-10 PROCEDURE — 85652 RBC SED RATE AUTOMATED: CPT | Performed by: STUDENT IN AN ORGANIZED HEALTH CARE EDUCATION/TRAINING PROGRAM

## 2017-02-10 PROCEDURE — 36569 INSJ PICC 5 YR+ W/O IMAGING: CPT

## 2017-02-10 PROCEDURE — 40000014 ZZH STATISTIC ARTERIAL MONITORING DAILY

## 2017-02-10 PROCEDURE — 25000132 ZZH RX MED GY IP 250 OP 250 PS 637: Mod: GY | Performed by: HOSPITALIST

## 2017-02-10 PROCEDURE — 25000128 H RX IP 250 OP 636

## 2017-02-10 PROCEDURE — 40000940 XR CHEST PORT 1 VW

## 2017-02-10 PROCEDURE — 00000146 ZZHCL STATISTIC GLUCOSE BY METER IP

## 2017-02-10 PROCEDURE — 85027 COMPLETE CBC AUTOMATED: CPT | Performed by: STUDENT IN AN ORGANIZED HEALTH CARE EDUCATION/TRAINING PROGRAM

## 2017-02-10 PROCEDURE — 40000275 ZZH STATISTIC RCP TIME EA 10 MIN

## 2017-02-10 PROCEDURE — 27210185 ZZH KIT OPEN ENDED DOUBLE LUMEN

## 2017-02-10 PROCEDURE — A9270 NON-COVERED ITEM OR SERVICE: HCPCS | Mod: GY | Performed by: HOSPITALIST

## 2017-02-10 PROCEDURE — 25000125 ZZHC RX 250: Performed by: NURSE PRACTITIONER

## 2017-02-10 PROCEDURE — 25000125 ZZHC RX 250

## 2017-02-10 PROCEDURE — A9270 NON-COVERED ITEM OR SERVICE: HCPCS | Mod: GY | Performed by: NURSE PRACTITIONER

## 2017-02-10 PROCEDURE — A9270 NON-COVERED ITEM OR SERVICE: HCPCS | Mod: GY | Performed by: STUDENT IN AN ORGANIZED HEALTH CARE EDUCATION/TRAINING PROGRAM

## 2017-02-10 PROCEDURE — 25000132 ZZH RX MED GY IP 250 OP 250 PS 637: Mod: GY | Performed by: NURSE PRACTITIONER

## 2017-02-10 PROCEDURE — 25000128 H RX IP 250 OP 636: Performed by: STUDENT IN AN ORGANIZED HEALTH CARE EDUCATION/TRAINING PROGRAM

## 2017-02-10 PROCEDURE — 27210195 ZZH KIT POWER PICC DOUBLE LUMEN

## 2017-02-10 PROCEDURE — 83036 HEMOGLOBIN GLYCOSYLATED A1C: CPT | Performed by: STUDENT IN AN ORGANIZED HEALTH CARE EDUCATION/TRAINING PROGRAM

## 2017-02-10 PROCEDURE — 86140 C-REACTIVE PROTEIN: CPT | Performed by: STUDENT IN AN ORGANIZED HEALTH CARE EDUCATION/TRAINING PROGRAM

## 2017-02-10 PROCEDURE — 25000125 ZZHC RX 250: Performed by: STUDENT IN AN ORGANIZED HEALTH CARE EDUCATION/TRAINING PROGRAM

## 2017-02-10 PROCEDURE — 71010 XR CHEST PORT 1 VW: CPT

## 2017-02-10 PROCEDURE — 12000001 ZZH R&B MED SURG/OB UMMC

## 2017-02-10 PROCEDURE — L0486 TLSO RIGIDLINED CUST FAB TWO: HCPCS

## 2017-02-10 PROCEDURE — 25000128 H RX IP 250 OP 636: Performed by: NEUROLOGICAL SURGERY

## 2017-02-10 RX ORDER — BISACODYL 10 MG
10 SUPPOSITORY, RECTAL RECTAL DAILY
Status: DISCONTINUED | OUTPATIENT
Start: 2017-02-10 | End: 2017-02-15 | Stop reason: HOSPADM

## 2017-02-10 RX ORDER — POLYETHYLENE GLYCOL 3350 17 G/17G
17 POWDER, FOR SOLUTION ORAL DAILY
Status: DISCONTINUED | OUTPATIENT
Start: 2017-02-10 | End: 2017-02-15 | Stop reason: HOSPADM

## 2017-02-10 RX ORDER — HEPARIN SODIUM,PORCINE 10 UNIT/ML
2-5 VIAL (ML) INTRAVENOUS
Status: DISCONTINUED | OUTPATIENT
Start: 2017-02-10 | End: 2017-02-15 | Stop reason: HOSPADM

## 2017-02-10 RX ORDER — HYDROMORPHONE HYDROCHLORIDE 1 MG/ML
.3-.5 INJECTION, SOLUTION INTRAMUSCULAR; INTRAVENOUS; SUBCUTANEOUS
Status: DISCONTINUED | OUTPATIENT
Start: 2017-02-10 | End: 2017-02-15 | Stop reason: HOSPADM

## 2017-02-10 RX ORDER — ATENOLOL 25 MG/1
25 TABLET ORAL DAILY
Status: DISCONTINUED | OUTPATIENT
Start: 2017-02-11 | End: 2017-02-15 | Stop reason: HOSPADM

## 2017-02-10 RX ORDER — HEPARIN SODIUM,PORCINE 10 UNIT/ML
5-10 VIAL (ML) INTRAVENOUS EVERY 24 HOURS
Status: DISCONTINUED | OUTPATIENT
Start: 2017-02-10 | End: 2017-02-15 | Stop reason: HOSPADM

## 2017-02-10 RX ORDER — HEPARIN SODIUM,PORCINE 10 UNIT/ML
5-10 VIAL (ML) INTRAVENOUS
Status: DISCONTINUED | OUTPATIENT
Start: 2017-02-10 | End: 2017-02-15 | Stop reason: HOSPADM

## 2017-02-10 RX ORDER — LIDOCAINE 40 MG/G
CREAM TOPICAL
Status: DISCONTINUED | OUTPATIENT
Start: 2017-02-10 | End: 2017-02-15 | Stop reason: HOSPADM

## 2017-02-10 RX ADMIN — ACETAMINOPHEN 975 MG: 325 TABLET, FILM COATED ORAL at 22:26

## 2017-02-10 RX ADMIN — VANCOMYCIN HYDROCHLORIDE 1500 MG: 10 INJECTION, POWDER, LYOPHILIZED, FOR SOLUTION INTRAVENOUS at 14:14

## 2017-02-10 RX ADMIN — TAMSULOSIN HYDROCHLORIDE 0.8 MG: 0.4 CAPSULE ORAL at 22:26

## 2017-02-10 RX ADMIN — BISACODYL 10 MG: 10 SUPPOSITORY RECTAL at 17:30

## 2017-02-10 RX ADMIN — PANTOPRAZOLE SODIUM 40 MG: 40 INJECTION, POWDER, FOR SOLUTION INTRAVENOUS at 08:42

## 2017-02-10 RX ADMIN — ATENOLOL 25 MG: 25 TABLET ORAL at 08:43

## 2017-02-10 RX ADMIN — SODIUM CHLORIDE, PRESERVATIVE FREE 5 ML: 5 INJECTION INTRAVENOUS at 16:33

## 2017-02-10 RX ADMIN — VANCOMYCIN HYDROCHLORIDE 1500 MG: 10 INJECTION, POWDER, LYOPHILIZED, FOR SOLUTION INTRAVENOUS at 02:26

## 2017-02-10 RX ADMIN — INSULIN ASPART 1 UNITS: 100 INJECTION, SOLUTION INTRAVENOUS; SUBCUTANEOUS at 08:39

## 2017-02-10 RX ADMIN — SENNOSIDES AND DOCUSATE SODIUM 2 TABLET: 8.6; 5 TABLET ORAL at 19:48

## 2017-02-10 RX ADMIN — LIDOCAINE HYDROCHLORIDE 3 ML: 10 INJECTION, SOLUTION INFILTRATION; PERINEURAL at 11:21

## 2017-02-10 RX ADMIN — OXYCODONE HYDROCHLORIDE 10 MG: 5 TABLET ORAL at 02:33

## 2017-02-10 RX ADMIN — HYDROMORPHONE HYDROCHLORIDE 0.5 MG: 10 INJECTION, SOLUTION INTRAMUSCULAR; INTRAVENOUS; SUBCUTANEOUS at 03:57

## 2017-02-10 RX ADMIN — CYCLOBENZAPRINE HYDROCHLORIDE 5 MG: 5 TABLET, FILM COATED ORAL at 09:48

## 2017-02-10 RX ADMIN — SENNOSIDES AND DOCUSATE SODIUM 2 TABLET: 8.6; 5 TABLET ORAL at 08:43

## 2017-02-10 RX ADMIN — INSULIN ASPART 4 UNITS: 100 INJECTION, SOLUTION INTRAVENOUS; SUBCUTANEOUS at 18:34

## 2017-02-10 RX ADMIN — HYDROMORPHONE HYDROCHLORIDE 0.5 MG: 10 INJECTION, SOLUTION INTRAMUSCULAR; INTRAVENOUS; SUBCUTANEOUS at 13:05

## 2017-02-10 RX ADMIN — HYDROMORPHONE HYDROCHLORIDE 0.5 MG: 10 INJECTION, SOLUTION INTRAMUSCULAR; INTRAVENOUS; SUBCUTANEOUS at 19:49

## 2017-02-10 RX ADMIN — MEROPENEM 1 G: 1 INJECTION, POWDER, FOR SOLUTION INTRAVENOUS at 16:54

## 2017-02-10 RX ADMIN — SENNOSIDES AND DOCUSATE SODIUM 2 TABLET: 8.6; 5 TABLET ORAL at 13:40

## 2017-02-10 RX ADMIN — MEROPENEM 1 G: 1 INJECTION, POWDER, FOR SOLUTION INTRAVENOUS at 08:42

## 2017-02-10 RX ADMIN — POLYETHYLENE GLYCOL 3350 17 G: 17 POWDER, FOR SOLUTION ORAL at 13:40

## 2017-02-10 RX ADMIN — OXYCODONE HYDROCHLORIDE 10 MG: 5 TABLET ORAL at 16:54

## 2017-02-10 RX ADMIN — ACETAMINOPHEN 975 MG: 325 TABLET, FILM COATED ORAL at 06:09

## 2017-02-10 RX ADMIN — OXYCODONE HYDROCHLORIDE 10 MG: 5 TABLET ORAL at 22:26

## 2017-02-10 RX ADMIN — OXYCODONE HYDROCHLORIDE 10 MG: 5 TABLET ORAL at 09:48

## 2017-02-10 RX ADMIN — INSULIN ASPART 3 UNITS: 100 INJECTION, SOLUTION INTRAVENOUS; SUBCUTANEOUS at 13:11

## 2017-02-10 RX ADMIN — ATORVASTATIN CALCIUM 40 MG: 40 TABLET, FILM COATED ORAL at 08:43

## 2017-02-10 RX ADMIN — ACETAMINOPHEN 975 MG: 325 TABLET, FILM COATED ORAL at 13:40

## 2017-02-10 ASSESSMENT — VISUAL ACUITY
OU: NORMAL ACUITY

## 2017-02-10 ASSESSMENT — PAIN DESCRIPTION - DESCRIPTORS
DESCRIPTORS: ACHING;CONSTANT

## 2017-02-10 NOTE — PLAN OF CARE
Problem: Goal Outcome Summary  Goal: Goal Outcome Summary  PT evaluation cx. After consultation with Neurosurgery team, will defer evaluation until after planned fusion on 2/12.  Will reschedule potential PT evaluation for 2/13.

## 2017-02-10 NOTE — PLAN OF CARE
Problem: Patient Care Overview (Adult)  Goal: Plan of Care Review  D/I: No neuro changes. Exam intact sans BLE numbness and movement limitations. Bedrest per NSG. SR, no BP issues. Weaned to room air, tolerating. Tolerated CL diet, advanced to regular, tolerated. SS insulin with meals and @ bedtime. UOP 35-50cc/hr. No BM. PRN oxy for pain.   A/P: Monitor neuro exam. Pain management. Bedrest.

## 2017-02-10 NOTE — PLAN OF CARE
Problem: Spinal Cord Injury (Acute and Chronic) (Adult)  Intervention: Monitor/Manage Pain  D:  Pt POD #1 s/p T9-10 laminectomy transpedicular disc space abscess evacuation     I/A: Patient hemodynamically stable. Has had worsening pain this shift. Needing PRN oxycodone and Dilaudid more frequently.     VS: BP elevated at times, when in pain. HR in 70s. Tmax 99.3. On RA.  Lines/drains/airway: left radial art line. 2 PIV. Dugan. Hemovac drain.  Neuro:  A/O x 4. Increased numbness in BLE, MD notified. No interventions at this time.   CV: Sinus rhythm. HR 70s.   Pulm: LS diminished.   GI:  No BM. BS hypoactive. Denies passing flatus.   : Dugan. UOP 30-45 cc/hr.   Skin: spinal incision C/D/I. Hemovac drain not putting anything out.   I/D: Tmax 99.3. On Meropenem and Vancomycin.   Pain: Has worsened during shift. Dilaudid frequency increased.     P: Continue to monitor and notify MD of changes. Plan is to go back to OR Sunday for spinal fusion.

## 2017-02-10 NOTE — PROGRESS NOTES
CLINICAL NUTRITION SERVICES - ASSESSMENT NOTE     Nutrition Prescription    RECOMMENDATIONS FOR MDs/PROVIDERS TO ORDER:  None today.     Malnutrition Status:    Severe malnutrition in the context of chronic illness    Recommendations already ordered by Registered Dietitian (RD):  Ensure Plus @ Breakfast and 2 pm daily    Future/Additional Recommendations:  Calorie counts if PO consistently <50% of meals     REASON FOR ASSESSMENT  Tad Manning is a/an 76 year old male assessed by the dietitian for Admission Nutrition Risk Screen for unintentional loss of 10# or more in the past two months    NUTRITION HISTORY  Pt states his appetite has been decreased for 1-2 months d/t altered taste. He can tolerate sweet flavors best but cannot taste meat very well. He drinks 1.5-2 Ensures daily at home.    CURRENT NUTRITION ORDERS  Diet: Regular  Intake/Tolerance: Pt stated he didn't like his hamburger from last night, ate 25% of breakfast. His appetite is fair, but willing to drink Ensure.    LABS  Labs reviewed    MEDICATIONS  Medications reviewed    ANTHROPOMETRICS  Height: 6'   Most Recent Weight: 81.2 kg on 2/7 (no wt yet this admission)     IBW: 80.9 kg  BMI: Normal BMI  Weight History: 41# (19%) wt loss over the past 3-4 months. Pt verbally confirmed this wt loss.   Wt Readings from Last 10 Encounters:   02/07/17 81.194 kg (179 lb)   01/24/17 85.7 kg (188 lb 15 oz)   12/29/16 95.255 kg (210 lb)   12/22/16 95.255 kg (210 lb)   10/27/16 99.791 kg (220 lb)   10/13/16 99.338 kg (219 lb)   09/22/16 99.338 kg (219 lb)   09/08/16 99.338 kg (219 lb)   08/23/16 99.338 kg (219 lb)   08/08/16 99.338 kg (219 lb)     Dosing Weight: 81 kg    ASSESSED NUTRITION NEEDS  Estimated Energy Needs: 2025 - 2430 kcals/day (25 - 30 kcals/kg)  Justification: Maintenance  Estimated Protein Needs: 97 - 122 grams protein/day (1.2 - 1.5 grams of pro/kg)  Justification: Hypercatabolism with acute illness  Estimated Fluid Needs: 1  mL/kcal  Justification: Maintenance    PHYSICAL FINDINGS  See malnutrition section below.    MALNUTRITION  % Intake: </=75% for >/= 1 month (severe)  % Weight Loss: > 10% in 6 months (severe)  Subcutaneous Fat Loss: Upper arm, Lower arm and Thoracic/intercostal: mild  Muscle Loss: Temporal, Thoracic region (clavicle, acromium bone, deltoid, trapezius, pectoral), Upper arm (bicep, tricep) and Lower arm  (forearm): moderate  Fluid Accumulation/Edema: None noted  Malnutrition Diagnosis: Severe malnutrition in the context of chronic illness    NUTRITION DIAGNOSIS  Inadequate oral intake related to fair appetite, altered taste as evidenced by reports of decreased PO intake x 1-2 months, 25% of meals consumed this admission, 19% wt loss in 3-4 months.    INTERVENTIONS  Implementation  Nutrition Education: Role of RD, ordering his home nutritional supplements while in the hospital. Pt was trying to order lunch so was unable to spend a large amount of time on education.  Medical food supplement therapy - Ensure Plus w/ breakfast and 2 pm    Goals  Patient to consume % of nutritionally adequate meal trays TID, or the equivalent with supplements/snacks.    Monitoring/Evaluation  Progress toward goals will be monitored and evaluated per protocol.    Gem Godwin RD, LD  Pager: [phone]

## 2017-02-10 NOTE — CONSULTS
Hospital Medicine - Gold Service   Medicine Consult Note      Patient Name: Tad Manning    MRN: [id number]  Date of Admission: 2/9/2017           Reason for Consult:   I was asked by Dr. Thurman to see Mr. Manning for assistance with management of medical co-morbidities and pre-operative evaluation.          HPI:   Mr. Manning is a 76-year-old man with a history of CAD s/p CABG ('95), PAD s/p bilateral stents, DM2, CLL in remission, who was admitted to the Neurosurgery service from [facility] with T9-10 discitis and epidural abscess. He is now status post T9-10 laminectomy for spinal cord decompression. Neurosurgery plans to bring the patient back to the OR for T7-12 posterior fusion with TLIF T9-10.     The patient was previously hospitalized 1/20/17-1/25/17 with STORM, hypotension, and syncope; these were attributed to dehydration and med effect (HCTZ/lisinopril, ibuprofen).  Admission creatinine was 4.2; this improved steadily with IV fluids and at discharge was 1.  The patient's lisinopril/HCTZ was discontinued, his atenolol was adjusted to once daily dosing. During that admission, a TTE showed decreased LV function but there was no prior TTE for comparison.     Today, the patient denies any chest pain, shortness of breath, or lightheadedness. He reports decreased ambulation and mobility over the past several months, which he attributes to the new boot he has been wearing for his R foot ulcer. He did have a few episodes of shortness of breath around the time he was admitted in January.  Prior to admission, the patient was sleeping on his couch due to his back pain (no orthopnea).  His main complaint at this time is low back pain. He does not have much feeling in his extremities at this time, with sensation particularly limited in the RLE.  Notably, he has not had a BM in approximately 5 days.     The patient's cardiovascular history includes 3 vessel CABG in 1995, stenting of the bilateral external  iliac arteries in 2013, then LLE above/below the knee popliteal bypass in 2014. He is on aspirin and plavix at home.   He was recently started on lantus insulin for diabetes management.            Past Medical History:     Past Medical History   Diagnosis Date     Coronary artery disease      triple bypass 1995     Diabetes mellitus (H)      History of blood transfusion      Hypertension      Malignant neoplasm (H)      CLL     Hyperlipidaemia      Sebaceous cyst      BMI 30.0-30.9,adult      BPH (benign prostatic hypertrophy)      Cellulitis      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)      Noninflammatory pericardial effusion      Osteomyelitis of left foot (H)      PVD (peripheral vascular disease) (H)      Diabetic polyneuropathy (H)      ASCVD (arteriosclerotic cardiovascular disease)      Arthritis      Reviewed with the patient.          Past Surgical History:     Past Surgical History   Procedure Laterality Date     Tonsillectomy       Bone marrow biopsy, bone specimen, needle/trocar  10/19/2012     Procedure: BIOPSY BONE MARROW;  BONE MARROW BIOPSY  (CONSCIOUS SED);  Surgeon: Ramon Quesada MD;  Location:  GI     Vascular surgery       ptcr legs     Bypass graft femoropopliteal  1/10/2014     Procedure: BYPASS GRAFT FEMOROPOPLITEAL;  Left above knee popliteal to  Below knee popliteal bypass using transverse saphenous vein graft. Left 2nd Toe amputation;  Surgeon: Amador Vick MD;  Location:  OR     Amputate toe(s)  1/10/2014     Procedure: AMPUTATE TOE(S);;  Surgeon: Amador Vick MD;  Location:  OR     Graft vein from extremity (location)  1/10/2014     Procedure: GRAFT VEIN FROM EXTREMITY (LOCATION);;  Surgeon: Amador Vick MD;  Location:  OR     Cardiac surgery       angioplasty with stent, triple bypass     Excise cyst generic (location) N/A 2/1/2016     Procedure: EXCISE CYST GENERIC (LOCATION);  Surgeon: Amador Vick MD;   Location: [facility]     Laminectomy thoracic one level N/A 2/8/2017     Procedure: LAMINECTOMY THORACIC ONE LEVEL;  Surgeon: Marcelo Trent MD;  Location:  OR     Reviewed with the patient.          Social History:     Social History     Social History     Marital Status: Single     Spouse Name: N/A     Number of Children: N/A     Years of Education: N/A     Occupational History     Not on file.     Social History Main Topics     Smoking status: Former Smoker -- 2.00 packs/day for 30 years     Types: Cigarettes     Quit date: 06/19/1995     Smokeless tobacco: Never Used     Alcohol Use: Yes      Comment: 1 drink monthly     Drug Use: No     Sexual Activity: Not on file     Other Topics Concern     Not on file     Social History Narrative    Lives independently with SO. 2/9/2017    Reviewed with the patient.          Family History:     Family History   Problem Relation Age of Onset     CANCER Mother      leukemia          Immunizations:     There is no immunization history on file for this patient.           Allergies:      Allergies   Allergen Reactions     Blood Transfusion Related (Informational Only) Other (See Comments)     Patient has a history of a clinically significant antibody against RBC antigens.  A delay in compatible RBCs may occur.             Medications:     Prior to Admission Medications   Prescriptions Last Dose Informant Patient Reported? Taking?   Ascorbic Acid (VITAMIN C PO)  Self Yes No   Sig: Take 500 mg by mouth daily.   Cholecalciferol (VITAMIN D PO)  Self Yes No   Sig: Take 1,000 Units by mouth daily.    Nitroglycerin (NITROQUICK SL)  Self Yes No   Sig: Place 0.4 mg under the tongue as needed.   ZOLPIDEM TARTRATE PO  Self Yes No   Sig: Take 5 mg by mouth nightly as needed    atenolol (TENORMIN) 25 MG tablet  Self No No   Sig: Take 1 tablet (25 mg) by mouth daily   atorvastatin (LIPITOR) 40 MG tablet  Self No No   Sig: Take 1 tablet by mouth daily.   bacitracin-polymyxin b (POLYSPORIN)  ointment  Self No No   Sig: Apply topically 2 times daily   insulin aspart (NOVOLOG PEN) 100 UNIT/ML injection   No No   Sig: Inject 1-6 Units Subcutaneous every 4 hours   insulin glargine (LANTUS) 100 UNIT/ML injection   No No   Sig: Inject 5 Units Subcutaneous At Bedtime   lidocaine (LIDODERM) 5 % Patch  Self Yes No   Sig: Place 1 patch onto the skin daily as needed for moderate pain to back for back pain   order for DME  Self No No   Sig: Equipment being ordered: [facility] shoe   oxyCODONE (ROXICODONE) 5 MG IR tablet   No No   Sig: Take 1-2 tablets (5-10 mg) by mouth every 3 hours as needed for moderate to severe pain   polyethylene glycol (MIRALAX/GLYCOLAX) Packet  Self No No   Sig: Take 17 g by mouth daily as needed for constipation   senna-docusate (SENOKOT-S;PERICOLACE) 8.6-50 MG per tablet  Self No No   Sig: Take 1-2 tablets by mouth 2 times daily as needed (constipation )   tamsulosin (FLOMAX) 0.4 MG 24 hr capsule  Self Yes No   Sig: Take 0.8 mg by mouth At Bedtime       Facility-Administered Medications: None             Review of Systems:   A complete ROS was performed and is negative other than what is stated in the HPI.          Physical Exam:   Blood pressure 137/71, pulse 91, temperature 98.4  F (36.9  C), temperature source Oral, resp. rate 16, SpO2 89 %.  General: NAD. Pleasant.  HEENT: No icterus or injection. EOMI.   Chest/Resp: Lungs CTAB anteriorly. Nonlabored breathing.   Heart/CV: RRR. Normal S1/S2. No extra heart sounds. Non-displaced PMI. No JVD. No LE edema.  Abdomen/GI: Soft, nontender, nondistended.   : Dugan in place.   Extremities/MSK: Warm, well perfused.   Skin: No rashes.   Neuro: Alert and oriented. Normal speech. RLE strength 1/5 gastroc/TA/EHL. LLE strength 4/5 gastroc, 3/5 TA and EHL.   Psych: Appropriate mood.            Data:   All laboratory results from the last 24 hours have been reviewed.     EKG (2/9/16): Sinus rhythm w/ PVC. Rate 87. LAD. Slight QRS widening. LVH with  associated ST-T changes in the lateral leads. Not markedly changed from 2/7/16.     TTE (1/20/17)  The left ventricle is mildly dilated at 6.0 cm. Left ventricular systolic  function is mild to moderately reduced as the visual ejection fraction is  estimated at 40-45%.  The biplane calculated LVEF = 43%. This decrease in the LVEF is due to mild  global hypokinesia and moderate to severe inferolateral wall hypokinesis.  Grade I or early diastolic dysfunction is noted  There is trace tricuspid regurgitation. The right ventricular systolic  pressure is normal approximated at 16mmHg plus the right atrial pressure.  The left atrium is moderately dilated by volume criteria at 41.6 ml/m2.  There is mild trileaflet aortic sclerosis but no aortic stenosis or  regurgitation is present.         Assessment and Plan:   Tad Manning is a 76 year old man with a history of CAD s/p 3v CABG (1995), PAD s/p bilateral LE stents,  IDDM2,  CLL in remission, admitted to the Neurosurgical service from OSH with T9-10 discitis and epidural absecess s/p T9-10 laminectomy for spinal cord decompression. Admission blood cultures growing GPCs in pairs/chains suggestive of strep mitis.  Neurosurgery is planning for T7-12 posterior fusion with TLIF T9-10.     # T9-10 discitis w/ epidural abscess s/p laminectomy  # GPC bacteremia, most likely S mitis.   - antibiotics per ID; agree with plan to narrow following blood culture speciation  - pain control per primary team, would minimize opioids in elderly pt     # Pre-operative evaluation  The patient's multiple co-morbidities (ischemic heart disease, CHF, diabetes) place him at an increased risk for major cardiac event (MACE) during the perioperative period. His RCRI score suggests >5% chance of MACE, whereas the NSQIP and Lange risk calculators estimate that risk at approximately 1%.  However, given the urgency of the procedure I would advise against further pre-operative testing. The patient  is currently well-compensated from a cardiovascular standpoint and is medically optimized.   - would proceed with planned surgery on Sunday  - will continue to follow patient daily  - management of patient's cardiovascular problems as below    # CAD s/p 3v CABG (1995)  # HFrEF (EF 40-45%)  The patient is currently asymptomatic and euvolemic on exam. His heart failure is well compensated. Pt has been receiving IVF to maintain MAP goal > 70. I recommend using the least amount of IVF possible in order to maintain euvolemia and avoid volume overload.   - continue atenolol  - minimize IV fluids   - daily weights    # PAD s/p bilateral iliac artery stents (2013) and LLE bypass (2014)  The patient's home ASA and plavix are being held in anticipation of surgery 2/12/17. Will defer to Neurosurgery on this decision. However, I recommend restarting aspirin as soon as is deemed safe following the surgery. Now that we are 3+ years out from the patient's stenting procedure, dual antiplatelet therapy becomes less essential. In anticipation of likely DVT ppx as well, it would be reasonable to continue to hold plavix post-operatively.   - recommend restarting aspirin as soon as is deemed safe by primary team  - can hold plavix post-operatively  - will discuss DVT ppx with you post-op    # DM2, insulin dependent  Had just been started on lantus (5 units) prior to admission. Unlikely that we will start long acting insulin in the preoperative period. May tolerate some slightly higher blood sugars. Will adjust insulin daily as needed.   - hold home oral hypoglycemics  - continue insulin sliding scale for now  - diabetic diet    # Constipation:  - miralax, senna/docusate  - add bisacodyl suppository    CODE: Full   Diet/IVF: Diabetic diet  DVT ppx: SCDs.       Norberto Rosas M.D.  Hospital Medicine   [facility]    Department of Medicine  Pager: [phone]    Date of Service: 2/10/2017  Page  Cross Cover after 5 pm: pager [phone]

## 2017-02-10 NOTE — PLAN OF CARE
OT 4A: OT orders received. Per neurosurgery team, pt with planned spinal fusion 2/12 and OT eval should be rescheduled to Monday 2/13 post-op.

## 2017-02-10 NOTE — PROGRESS NOTES
Care Coordinator Progress Note     Admission Date/Time:  2/9/2017  Attending MD:  Marcelo Trent MD     Data  Chart reviewed, discussed with interdisciplinary team.   Pt is with hx of CAD, DM with neuropathy, HTN, PVD s/p bilateral LE stents on ASA/plvx, Chronic lymphocytic leukemia in remission who presents after a fall and is found to have infectious spondylitis at T9-10 with paraspinal abscesses.    Concerns with insurance coverage for discharge needs: None.  Current Living Situation: Patient lives with his SO  Support System:Brother, sister in-law and friends- Supportive and Involved  Services Involved: Home Care  Transportation: Family or Friend will provide  Barriers to Discharge: chronically ill    Coordination of Care and Referrals:   Met with pt, brother Seferino and sister in-law to introduce CC role and for support.  CC role discussed and contact info provided.  Pt and family stated the team have been updating them well about the plan of care.  Pt stated he was told he will need to go to rehab after his surgery.  CC explained to pt and family about the different level of rehabs and referral process.    Per discussion with pt and family, pt was ind. with all his cares and working prior hospitalization.  Pt is not  and doesn't have children.  Pt brother stated he is his decision maker if he can't make decision.  CC explained to pt and family the difference between POA and Health care agent and informed them that we only notarize health care directive form.  Pt and family request to get blank Health care directive form  CC provided the form.      Pt stated he was signed to get home care service and they were scheduled to come to see him this week (RN, PT and OT).  Pt not sure about the name of home care agency (? [facility]).   Pt stated he understand he will need to go to rehab before he goes home and will need to be on IV Abx.  CC informed pt and family that CC/SW will cont to f/u and assist  with d/c planning.  Pt and family agreed.     Assessment  Pt is POD #1 s/p T9-10 laminectomy transpedicular disc space abscess evacuation.  Per morning report and chart review, pt will go back to OR on Sunday, 2/12 for posterior fusion T7-12.     Plan  Anticipated Discharge Date:  TBD.  Anticipated Discharge Plan:   Anticipated pt will need Rehab placement once medically stable..  CC will cont to follow plan of care.      Sravanthi Neri RN, BSN  4A and 4E/ ICU  Care Coordinator  Phone: [phone]  Pager: [phone]

## 2017-02-10 NOTE — PLAN OF CARE
Problem: Laminectomy/Foraminotomy/Discectomy (Adult)  Goal: Signs and Symptoms of Listed Potential Problems Will be Absent or Manageable (Laminectomy/Foraminotomy/Discectomy)  Signs and symptoms of listed potential problems will be absent or manageable by discharge/transition of care (reference Laminectomy/Foraminotomy/Discectomy (Adult) CPG).  Outcome: No Change    02/10/17 1555   Laminectomy/Foraminotomy/Discectomy   Problems Assessed (Laminectomy/Laminotomy/Discectomy) all   Problems Present (Laminectomy/Laminotomy/Discectomy) pain;constipation;functional decline/self-care deficit;infection;neurologic/neuromuscular complications/deficit  (pt only slightly moves his bilateral feet/legs)         Problem: Goal Outcome Summary  Goal: Goal Outcome Summary  Outcome: No Change  D;  Upon taking over care of pt at 07:00, pt with ongoing back pain with any kind of movement.    I;  RN monitored patient's pain treating with Tylenol, Dilaudid and Oxycodone throughout the shift assessing pt's response.  RN assisted with pt's TLSO brace when it arrived tot he floor.  A;  Pt continues with ongoing pain especially apparent when moving or being repositioned.  Pt moves very slowly.  RN with orders to transfer pt up to 6A.  RN transferred pt giving hand off to on coming RN.  Pt left via his bed at 15:30.  Pt continues with minimal movements of his bilateral lower extremities to command ex-  Dorsiflexion of his feet pt moves his foot side to side on the left and only slightly.    P:  Continue with current plan of care on 6A.

## 2017-02-10 NOTE — PROGRESS NOTES
"S: Patient seen today for delivery of custom thoracolumbosacral orthosis (TLSO.)  O/G: support and stabilize the spine with maximum comfort  A: Patient seen for fitting of TLSO.  Donning, doffing, use and care were explained to patient/nursing staff. Patient was provided with a custom padded bivalve TLSO as manufactured by [employer].  Patient was satisfied with the brace at this time.  P: Patient has been instructed to contact our facility with any future questions and/or concerns.    Fitting a Hunter TLSO  1. Patient should be supine.  Palpate for waist (below ribs but above hips) so you know where to line up waist grooves of the brace.   2. Logroll patient and slide back section of brace under patient lining up waist groove of brace with patient's waist.   3. Roll patient onto their back with the posterior section behind them.  Recheck alignment of waist groove on brace with patient's waist.  It may be necessary to logroll patient to the opposite side to get posterior section centered on patient, where it extends anteriorly equal amounts on patient's sides.  Repeat logrolling side to side as necessary.   4.  Once posterior section positioned correctly, lay anterior section on patient.  Again, line up waist groove of brace to patient's waist.  Waist grooves of anterior and posterior section should match up and fit together. Anterior shell should go over the top of posterior shell.  Velcro straps should line up with the chafes/D-rings, if they don't, either the anterior or the posterior sections are not in the proper place.    5. Fasten the middle straps first and tighten both sides evenly.  Then fasten either top or bottom straps in the same manner.  Brace should be snug so as to prevent brace from sliding up on patient.   If it is too loose, the brace will slide up and cause discomfort to the patient in the chin and/or axilla area.  6.  When properly fit, brace should be about 1\" inferior to the manubrium and " "about 1.5-2\" inferior to the axilla.  The inferior aspect should allow clearance so as not to cut into the tops of the thighs when sitting, but needs to be as low in the pelvic area as possible.     For optimal fit brace should NOT be donned with patient sitting.  Ideal fit is achieved by donning in either laying or standing position.  Due to changes in belly tissue, it is difficult to achieve a proper fit when patient is sitting.     Brace migration is inevitable, especially when patient is going from laying to upright in bed.  Bed will push posterior section of brace up and will push the whole brace up.  Migration will also occur when patient is sitting in a hard chair.  Don't be afraid to readjust brace and pull it down and back to its proper position.      "

## 2017-02-10 NOTE — PROGRESS NOTES
[facility] ID SERVICE: ONGOING CONSULTATION     Patient:  Tad Manning, Date of birth 1940, Medical record number [id number]  Date of Visit:  02/10/2017         Assessment and Recommendations:   Problem List:  1. 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 mitis  2. Streptococcus mitis bacteremia  3. 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 PVD with bilateral LE stents on ASA/plvx  5. Chronic lymphocytic leukemia in remission (last chemo in Sept 2015)    Recommendations:  -Obtain repeat blood cultures (ordered for you).  -Continue vancomycin/meropenem until S. Mitis susceptibilities return.   -Defer PICC placement until blood cultures negative x 48 hours.    Discussion:  Patient with PMH CLL in remission, DMII, PVD admitted as transfer 2/9/2017 with thoracic back pain and LE weakness - found to have T9-10 discitis with overlying epidural abscess, now s/p laminectomy/abscess evacuation with intraoperative cultures with NGTD but blood cultures showing Streptococcus mitis (oral himanshu). Overall picture suspicious for Streptococcal bacteremia from likely mouth source (given recent dental cleaning) with seeding of spine; would continue vancomycin/meropenem until susceptibilities return, hopefully should be able to better target antibiotics at that time. Anticipate 6 week course of antibiotics but would defer PICC placement until blood cultures clear. Depending on duration of blood culture positivity may need to further evaluate for metastatic infection with TTE, additional imaging etc. but would defer for now.     The [facility] general ID team will continue to follow this patient. Please feel free to call with any questions.     Patient discussed with ID attending Dr. Max.   Deborah Herrera MD - N ID fellow       Subjective/Interval events:   -Blood cultures speciated as Streptococcus  mitis  -Afebrile    Harmeet is feeling better today - back pain improved. Has poor sensation to R foot at baseline, no pain at callous. No recent drainage.          Review of Systems:   10 pt ROS negative except where noted in HPI.         Recent Antimicrobials::   Vancomycin 2/9-current  Meropenem 2/9-current  Zosyn 2/9         Physical Exam:   Ranges forvital signs:  Temp:  [97.5  F (36.4  C)-99.3  F (37.4  C)] 98.4  F (36.9  C)  Heart Rate:  [72-86] 77  Resp:  [14-16] 16  MAP:  [67 mmHg-93 mmHg] 82 mmHg  Arterial Line BP: (118-154)/(44-62) 150/50 mmHg  SpO2:  [91 %-99 %] 94 %    Intake/Output Summary (Last 24 hours) at 02/09/17 0956  Last data filed at 02/09/17 0930   Gross per 24 hour   Intake   1811 ml   Output    375 ml   Net   1436 ml       Exam:  GENERAL:  well-developed, well-nourished, lying  bed in no acute distress.   ENT:  Head is normocephalic, atraumatic. Oropharynx is moist without exudates or ulcers; metal partial in place upper teeth, tooth 27 darkened; no erythema or tenderness noted  EYES:  Eyes have anicteric sclerae, no injection  LUNGS:  Clear to auscultation.  CARDIOVASCULAR:  Regular rate and rhythm with no murmurs, gallops or rubs.  ABDOMEN:  abd soft, nontender.  EXT: Extremities warm and without edema; R foot with scabbed over callous without opening, no drainage, no surrounding erythema/edema  SKIN:  No acute rashes; no stigmata of endocarditis. No central lines. Spinal drain with bloody fluid.   NEUROLOGIC: AAO x 3, conversational, grasp intact bl, R ankle dorsiflexion/plantarflexion impaired         Laboratory Data:     CREATININE   Date Value Ref Range Status   02/10/2017 0.68 0.66 - 1.25 mg/dL Final   02/09/2017 0.81 0.66 - 1.25 mg/dL Final   02/07/2017 0.75 0.66 - 1.25 mg/dL Final   01/23/2017 1.01 0.66 - 1.25 mg/dL Final   01/22/2017 1.31* 0.66 - 1.25 mg/dL Final     WBC   Date Value Ref Range Status   02/10/2017 8.8 4.0 - 11.0 10e9/L Final   02/09/2017 8.6 4.0 - 11.0 10e9/L Final    02/09/2017 8.8 4.0 - 11.0 10e9/L Final   02/07/2017 9.7 4.0 - 11.0 10e9/L Final   01/24/2017 6.8 4.0 - 11.0 10e9/L Final     HEMOGLOBIN   Date Value Ref Range Status   02/10/2017 8.3* 13.3 - 17.7 g/dL Final     PLATELET COUNT   Date Value Ref Range Status   02/10/2017 215 150 - 450 10e9/L Final     Lab Results   Component Value Date     02/10/2017    BUN 14 02/10/2017    CO2 25 02/10/2017          Pertinent Recent Microbiology Data:   2/8/17 2/2 BC with S. mitis  2/9/17 Spinal bone culture NGTD  2/9/17 Spinal fluid culture x 2 with GPCs         Imaging:     CT thoracic spine 2/9:  There are new postoperative changes of T9 and T10 decompressive  laminectomy with improved patency of the spinal canal at these levels.  A surgical drain is noted in the posterior soft tissues. There are  stable destructive bony changes about the T9-T10 disc space and  increased sclerosis of the remaining T9 and T10 vertebral bodies.  Thoracic spine alignment is unchanged. Stable moderate to severe right  and severe left neural foramen narrowing at T9-10. Paraspinal  abscesses are better evaluated on recent MRI. Small bilateral pleural  effusions with associated atelectasis are similar to previous when  differences in technique. Simple renal cysts bilaterally are  unchanged. There is extensive atherosclerotic calcification in the  abdominal aorta and its branches without evidence of aneurysm.

## 2017-02-10 NOTE — PROGRESS NOTES
[facility], [place]   Neurosurgery Daily Note          Assessment and Plan:   Tad Manning is a 76 year old male with CAD, DM with neuropathy, HTN, PVD s/p bilateral LE stents on ASA/plvx, Chronic lymphocytic leukemia in remission who presents after a fall and is found to have infectious spondylitis at T9-10 with paraspinal abscesses, retrolisthesis and epidural inflammation resulting in severe canal stenosis and cord impingement. On 2/9 he underwent T9-10 laminectomy for spinal cord decompression. He is improving post operatively. Blood cultures are growing GPCs in pairs and chains.    - MAP goals >70  - ok for transfer to the floor   - plan for fusion on Sunday:  T7-12 posterior fusion with TLIF T9-10.   - medicine pre-op clearance   - TLSO brace  - hold ASA and plavix   - follow up blood cultures; ID consult  - q1 hour neuro checks  - Bowel regimen  - PRN pain meds/antiemetics  - PT/OT    - SCDs      John (Jack) M. Leschke, M.D.             Interval History:   Improving neurologic status.             Review of Systems:   The Review of Systems is otherwise negative beyond that which has been previously stated.          Medications:   I have reviewed this patient's current medications.    Current Facility-Administered Medications   Medication Dose Route Frequency     atenolol  25 mg Oral Daily     atorvastatin  40 mg Oral Daily     tamsulosin  0.8 mg Oral At Bedtime     sodium chloride (PF)  3 mL Intracatheter Q8H     insulin aspart  1-10 Units Subcutaneous TID AC     insulin aspart  1-7 Units Subcutaneous At Bedtime     pantoprazole (PROTONIX) IV PEDS/NICU  40 mg Intravenous Daily     acetaminophen  975 mg Oral Q8H     senna-docusate  1-2 tablet Oral BID     vancomycin (VANCOCIN) IV  1,500 mg Intravenous Q12H     meropenem  1 g Intravenous Q8H        Physical Exam:  General:   Comfortable respiratory effort; normal cardiac rhythm.     Mental Status:    awake, alert and oriented x3.     fluent, communicative, intact comprehension.    Cranial Nerves:  equal and reactive pupils    extraocular movements preserved    no dysarthria    Motor/Sensory:  1/5 throughout the right lower extremity; left lower extremity - 4/5 plantar flexion, 3/5 dorsi flexion and EHL, otherwise 2/5 at the knee and hip.   No deficits to pain, soft touch.    Intact perianal sensation  + rectal tone     Reflexes: hypoactive in the lower extremities         Data:   The labs and imaging for this patient have been reviewed directly.

## 2017-02-11 LAB
ANION GAP SERPL CALCULATED.3IONS-SCNC: 9 MMOL/L (ref 3–14)
BUN SERPL-MCNC: 12 MG/DL (ref 7–30)
CALCIUM SERPL-MCNC: 8.4 MG/DL (ref 8.5–10.1)
CHLORIDE SERPL-SCNC: 102 MMOL/L (ref 94–109)
CO2 SERPL-SCNC: 23 MMOL/L (ref 20–32)
CREAT SERPL-MCNC: 0.68 MG/DL (ref 0.66–1.25)
ERYTHROCYTE [DISTWIDTH] IN BLOOD BY AUTOMATED COUNT: 16 % (ref 10–15)
GFR SERPL CREATININE-BSD FRML MDRD: ABNORMAL ML/MIN/1.7M2
GLUCOSE SERPL-MCNC: 178 MG/DL (ref 70–99)
HCT VFR BLD AUTO: 28.6 % (ref 40–53)
HGB BLD-MCNC: 9.2 G/DL (ref 13.3–17.7)
MAGNESIUM SERPL-MCNC: 1.8 MG/DL (ref 1.6–2.3)
MCH RBC QN AUTO: 28.1 PG (ref 26.5–33)
MCHC RBC AUTO-ENTMCNC: 32.2 G/DL (ref 31.5–36.5)
MCV RBC AUTO: 88 FL (ref 78–100)
PLATELET # BLD AUTO: 166 10E9/L (ref 150–450)
POTASSIUM SERPL-SCNC: 3.8 MMOL/L (ref 3.4–5.3)
RBC # BLD AUTO: 3.27 10E12/L (ref 4.4–5.9)
SODIUM SERPL-SCNC: 134 MMOL/L (ref 133–144)
WBC # BLD AUTO: 6.8 10E9/L (ref 4–11)

## 2017-02-11 PROCEDURE — A9270 NON-COVERED ITEM OR SERVICE: HCPCS | Mod: GY | Performed by: NEUROLOGICAL SURGERY

## 2017-02-11 PROCEDURE — 86901 BLOOD TYPING SEROLOGIC RH(D): CPT | Performed by: NEUROLOGICAL SURGERY

## 2017-02-11 PROCEDURE — 83735 ASSAY OF MAGNESIUM: CPT | Performed by: HOSPITALIST

## 2017-02-11 PROCEDURE — 36415 COLL VENOUS BLD VENIPUNCTURE: CPT | Performed by: HOSPITALIST

## 2017-02-11 PROCEDURE — 85027 COMPLETE CBC AUTOMATED: CPT | Performed by: HOSPITALIST

## 2017-02-11 PROCEDURE — 25000128 H RX IP 250 OP 636: Performed by: STUDENT IN AN ORGANIZED HEALTH CARE EDUCATION/TRAINING PROGRAM

## 2017-02-11 PROCEDURE — 25000132 ZZH RX MED GY IP 250 OP 250 PS 637: Mod: GY | Performed by: NURSE PRACTITIONER

## 2017-02-11 PROCEDURE — 25000132 ZZH RX MED GY IP 250 OP 250 PS 637: Mod: GY | Performed by: STUDENT IN AN ORGANIZED HEALTH CARE EDUCATION/TRAINING PROGRAM

## 2017-02-11 PROCEDURE — A9270 NON-COVERED ITEM OR SERVICE: HCPCS | Mod: GY | Performed by: HOSPITALIST

## 2017-02-11 PROCEDURE — 25000128 H RX IP 250 OP 636: Performed by: NEUROLOGICAL SURGERY

## 2017-02-11 PROCEDURE — 25000132 ZZH RX MED GY IP 250 OP 250 PS 637: Mod: GY | Performed by: NEUROLOGICAL SURGERY

## 2017-02-11 PROCEDURE — 25000132 ZZH RX MED GY IP 250 OP 250 PS 637: Mod: GY | Performed by: HOSPITALIST

## 2017-02-11 PROCEDURE — 80048 BASIC METABOLIC PNL TOTAL CA: CPT | Performed by: HOSPITALIST

## 2017-02-11 PROCEDURE — 99233 SBSQ HOSP IP/OBS HIGH 50: CPT | Performed by: HOSPITALIST

## 2017-02-11 PROCEDURE — 00000146 ZZHCL STATISTIC GLUCOSE BY METER IP

## 2017-02-11 PROCEDURE — 12000003 ZZH R&B CRITICAL UMMC

## 2017-02-11 PROCEDURE — 86923 COMPATIBILITY TEST ELECTRIC: CPT | Performed by: NEUROLOGICAL SURGERY

## 2017-02-11 PROCEDURE — 25000125 ZZHC RX 250: Performed by: STUDENT IN AN ORGANIZED HEALTH CARE EDUCATION/TRAINING PROGRAM

## 2017-02-11 PROCEDURE — 25000125 ZZHC RX 250

## 2017-02-11 PROCEDURE — 25000125 ZZHC RX 250: Performed by: NURSE PRACTITIONER

## 2017-02-11 PROCEDURE — A9270 NON-COVERED ITEM OR SERVICE: HCPCS | Mod: GY | Performed by: STUDENT IN AN ORGANIZED HEALTH CARE EDUCATION/TRAINING PROGRAM

## 2017-02-11 PROCEDURE — 25000128 H RX IP 250 OP 636

## 2017-02-11 PROCEDURE — 86900 BLOOD TYPING SEROLOGIC ABO: CPT | Performed by: NEUROLOGICAL SURGERY

## 2017-02-11 PROCEDURE — 40000802 ZZH SITE CHECK

## 2017-02-11 PROCEDURE — A9270 NON-COVERED ITEM OR SERVICE: HCPCS | Mod: GY | Performed by: NURSE PRACTITIONER

## 2017-02-11 PROCEDURE — 86850 RBC ANTIBODY SCREEN: CPT | Performed by: NEUROLOGICAL SURGERY

## 2017-02-11 RX ORDER — CEFAZOLIN SODIUM 2 G/100ML
2 INJECTION, SOLUTION INTRAVENOUS
Status: COMPLETED | OUTPATIENT
Start: 2017-02-11 | End: 2017-02-12

## 2017-02-11 RX ORDER — CEFAZOLIN SODIUM 1 G/3ML
1 INJECTION, POWDER, FOR SOLUTION INTRAMUSCULAR; INTRAVENOUS SEE ADMIN INSTRUCTIONS
Status: DISCONTINUED | OUTPATIENT
Start: 2017-02-11 | End: 2017-02-12 | Stop reason: HOSPADM

## 2017-02-11 RX ADMIN — ACETAMINOPHEN 975 MG: 325 TABLET, FILM COATED ORAL at 13:54

## 2017-02-11 RX ADMIN — OXYCODONE HYDROCHLORIDE 10 MG: 5 TABLET ORAL at 02:36

## 2017-02-11 RX ADMIN — SENNOSIDES AND DOCUSATE SODIUM 2 TABLET: 8.6; 5 TABLET ORAL at 08:30

## 2017-02-11 RX ADMIN — VANCOMYCIN HYDROCHLORIDE 1500 MG: 10 INJECTION, POWDER, LYOPHILIZED, FOR SOLUTION INTRAVENOUS at 02:36

## 2017-02-11 RX ADMIN — VANCOMYCIN HYDROCHLORIDE 1500 MG: 10 INJECTION, POWDER, LYOPHILIZED, FOR SOLUTION INTRAVENOUS at 13:54

## 2017-02-11 RX ADMIN — TAMSULOSIN HYDROCHLORIDE 0.8 MG: 0.4 CAPSULE ORAL at 21:02

## 2017-02-11 RX ADMIN — HYDROMORPHONE HYDROCHLORIDE 0.5 MG: 10 INJECTION, SOLUTION INTRAMUSCULAR; INTRAVENOUS; SUBCUTANEOUS at 03:52

## 2017-02-11 RX ADMIN — HYDROMORPHONE HYDROCHLORIDE 0.5 MG: 10 INJECTION, SOLUTION INTRAMUSCULAR; INTRAVENOUS; SUBCUTANEOUS at 23:10

## 2017-02-11 RX ADMIN — OXYCODONE HYDROCHLORIDE 10 MG: 5 TABLET ORAL at 20:02

## 2017-02-11 RX ADMIN — ACETAMINOPHEN 975 MG: 325 TABLET, FILM COATED ORAL at 21:01

## 2017-02-11 RX ADMIN — INSULIN ASPART 3 UNITS: 100 INJECTION, SOLUTION INTRAVENOUS; SUBCUTANEOUS at 14:27

## 2017-02-11 RX ADMIN — HYDROMORPHONE HYDROCHLORIDE 0.5 MG: 10 INJECTION, SOLUTION INTRAMUSCULAR; INTRAVENOUS; SUBCUTANEOUS at 16:55

## 2017-02-11 RX ADMIN — CYCLOBENZAPRINE HYDROCHLORIDE 5 MG: 5 TABLET, FILM COATED ORAL at 13:50

## 2017-02-11 RX ADMIN — ATORVASTATIN CALCIUM 40 MG: 40 TABLET, FILM COATED ORAL at 08:30

## 2017-02-11 RX ADMIN — ATENOLOL 25 MG: 25 TABLET ORAL at 08:30

## 2017-02-11 RX ADMIN — ACETAMINOPHEN 975 MG: 325 TABLET, FILM COATED ORAL at 06:30

## 2017-02-11 RX ADMIN — INSULIN ASPART 4 UNITS: 100 INJECTION, SOLUTION INTRAVENOUS; SUBCUTANEOUS at 19:09

## 2017-02-11 RX ADMIN — POLYETHYLENE GLYCOL 3350 17 G: 17 POWDER, FOR SOLUTION ORAL at 08:30

## 2017-02-11 RX ADMIN — PANTOPRAZOLE SODIUM 40 MG: 40 INJECTION, POWDER, FOR SOLUTION INTRAVENOUS at 08:26

## 2017-02-11 RX ADMIN — BISACODYL 10 MG: 10 SUPPOSITORY RECTAL at 08:30

## 2017-02-11 RX ADMIN — MEROPENEM 1 G: 1 INJECTION, POWDER, FOR SOLUTION INTRAVENOUS at 00:01

## 2017-02-11 RX ADMIN — SENNOSIDES AND DOCUSATE SODIUM 2 TABLET: 8.6; 5 TABLET ORAL at 20:02

## 2017-02-11 RX ADMIN — INSULIN ASPART 2 UNITS: 100 INJECTION, SOLUTION INTRAVENOUS; SUBCUTANEOUS at 08:24

## 2017-02-11 RX ADMIN — OXYCODONE HYDROCHLORIDE 10 MG: 5 TABLET ORAL at 15:40

## 2017-02-11 RX ADMIN — OXYCODONE HYDROCHLORIDE 10 MG: 5 TABLET ORAL at 06:26

## 2017-02-11 RX ADMIN — OXYCODONE HYDROCHLORIDE 10 MG: 5 TABLET ORAL at 11:09

## 2017-02-11 RX ADMIN — CYCLOBENZAPRINE HYDROCHLORIDE 5 MG: 5 TABLET, FILM COATED ORAL at 06:26

## 2017-02-11 RX ADMIN — MEROPENEM 1 G: 1 INJECTION, POWDER, FOR SOLUTION INTRAVENOUS at 08:29

## 2017-02-11 RX ADMIN — HYDROMORPHONE HYDROCHLORIDE 0.5 MG: 10 INJECTION, SOLUTION INTRAMUSCULAR; INTRAVENOUS; SUBCUTANEOUS at 21:02

## 2017-02-11 RX ADMIN — MEROPENEM 1 G: 1 INJECTION, POWDER, FOR SOLUTION INTRAVENOUS at 17:24

## 2017-02-11 RX ADMIN — SODIUM CHLORIDE, PRESERVATIVE FREE 5 ML: 5 INJECTION INTRAVENOUS at 16:55

## 2017-02-11 RX ADMIN — HYDROMORPHONE HYDROCHLORIDE 0.5 MG: 10 INJECTION, SOLUTION INTRAMUSCULAR; INTRAVENOUS; SUBCUTANEOUS at 09:26

## 2017-02-11 RX ADMIN — CYCLOBENZAPRINE HYDROCHLORIDE 5 MG: 5 TABLET, FILM COATED ORAL at 22:45

## 2017-02-11 ASSESSMENT — VISUAL ACUITY
OU: NORMAL ACUITY

## 2017-02-11 NOTE — PLAN OF CARE
Problem: Goal Outcome Summary  Goal: Goal Outcome Summary  Outcome: No Change  Pt transferred to the floor at 1530; bedside neuro check performed with SICU RN. AO x4. Neuro exam is limited by pain, unable to assess pronator drift. R fingertip numbness. BLE numbness. RLE 1/5, wiggles toes. LLE 2/5 can lift leg up slightly. Hemovac with no output, dressing CDI. Pt was given oxycodone with some relief in back pain and successfully repositioned with assist of 2. He is starting a regular diet dinner. IVF per PICC, abx as ordered. Pt has had no BM x5 days; suppository given; pt noted to have pustule rash around his anus. Sacrum has generalized redness that is not blanchable in places; mepilex in place. Dugan in place; needs to be reassessed for need. Cont diligent pain management.

## 2017-02-11 NOTE — PROGRESS NOTES
[facility], [place]   Neurosurgery Daily Note          Assessment and Plan:   Tad Manning is a 76 year old male with CAD, DM with neuropathy, HTN, PVD s/p bilateral LE stents on ASA/plvx, Chronic lymphocytic leukemia in remission who presents after a fall and is found to have infectious spondylitis at T9-10 with paraspinal abscesses, retrolisthesis and epidural inflammation resulting in severe canal stenosis and cord impingement. On 2/9 he underwent T9-10 laminectomy for spinal cord decompression. He is improving post operatively. Blood cultures are growing GPCs in pairs and chains.    - plan for fusion on Sunday:  T7-12 posterior fusion with TLIF T9-10.   - type/screen, TXA  - medicine pre-op clearance obtained   - remove drain   - TLSO brace  - hold ASA and plavix   - follow up blood cultures; ID consult  - q1 hour neuro checks  - Bowel regimen  - PRN pain meds/antiemetics  - PT/OT    - SCDs      John (Jack) M. Leschke, M.D.             Interval History:   Pain improved.              Review of Systems:   The Review of Systems is otherwise negative beyond that which has been previously stated.          Medications:   I have reviewed this patient's current medications.    Current Facility-Administered Medications   Medication Dose Route Frequency     polyethylene glycol  17 g Oral Daily     atenolol  25 mg Oral Daily     heparin lock flush  5-10 mL Intracatheter Q24H     bisacodyl  10 mg Rectal Daily     atorvastatin  40 mg Oral Daily     tamsulosin  0.8 mg Oral At Bedtime     sodium chloride (PF)  3 mL Intracatheter Q8H     insulin aspart  1-10 Units Subcutaneous TID AC     insulin aspart  1-7 Units Subcutaneous At Bedtime     pantoprazole (PROTONIX) IV PEDS/NICU  40 mg Intravenous Daily     acetaminophen  975 mg Oral Q8H     senna-docusate  1-2 tablet Oral BID     vancomycin (VANCOCIN) IV  1,500 mg Intravenous Q12H     meropenem  1 g Intravenous Q8H        Physical Exam:  General:    Comfortable respiratory effort; normal cardiac rhythm.     Mental Status:    awake, alert and oriented x3.    fluent, communicative, intact comprehension.    Cranial Nerves:  equal and reactive pupils    extraocular movements preserved    no dysarthria    Motor/Sensory:  1/5 throughout the right lower extremity; left lower extremity - 4/5 plantar flexion, 3/5 dorsi flexion and EHL, otherwise 2/5 at the knee and hip.   No deficits to pain, soft touch.    Intact perianal sensation  + rectal tone     Reflexes: hypoactive in the lower extremities         Data:   The labs and imaging for this patient have been reviewed directly.        I have reviewed the history.I have seen and examined the patient myself and agree with the assessment and plan above. Left LE improving faster than right.  DWIGHT Messina MD

## 2017-02-11 NOTE — PLAN OF CARE
Problem: Laminectomy/Foraminotomy/Discectomy (Adult)  Goal: Signs and Symptoms of Listed Potential Problems Will be Absent or Manageable (Laminectomy/Foraminotomy/Discectomy)  Signs and symptoms of listed potential problems will be absent or manageable by discharge/transition of care (reference Laminectomy/Foraminotomy/Discectomy (Adult) CPG).   Outcome: No Change  VS with HTN. A&Ox4. Neuros unchanged with neuropathy in hands and feet, RLE 1/5, LLE 1+-2/5. Oxycodone, flexeril, tylenol and IV dilaudid given for pain. Back incision covered with primapore, dressing with dried drainage on it. Hemovac in place. Sacrum with mepilex in place, visible area around mepilex reddened and blanchable. Up with A2/lift,pt on bedrest with commode privileges, TLSO on when OOB, turn/repo q2h and prn. IV infusing at 50mL/hr. CHO diet. Dugan in place with adequate output, MD order to remove today/POD #2. Cont to monitor aand with POC. MD would like a weight for pt.

## 2017-02-11 NOTE — PROGRESS NOTES
Hospital Medicine  Follow-up Consult Note  Date of Service: February 11, 2017    Patient: Tad Manning  MRN: [id number]  Admission Date: 2/9/2017  Hospital Day # 2      Assessment & Plan: Tad Manning is a 76 year old man with a history of CAD s/p 3v CABG (1995), PAD s/p bilateral LE stents,  IDDM2,  CLL in remission, admitted to the Neurosurgical service from OSH with T9-10 discitis and epidural absecess s/p T9-10 laminectomy for spinal cord decompression. Admission blood cultures growing strep mitis.  Neurosurgery is planning for T7-12 posterior fusion with TLIF T9-10.     # T9-10 discitis w/ epidural abscess s/p laminectomy  # Streptococcus mitis bacteremia    - antibiotics per ID; agree with plan to narrow following blood culture speciation  - pain control per primary team  - leave PICC line in for now; may need to remove if repeat blood cultures come back positive     # Pre-operative evaluation  The patient's multiple co-morbidities (ischemic heart disease, CHF, diabetes) place him at an increased risk for major cardiac event (MACE) during the perioperative period. His RCRI score suggests >5% chance of MACE, whereas the NSQIP and Lange risk calculators estimate that risk at approximately 1%.  However, given the urgency of the procedure I would advise against further pre-operative testing. The patient is currently well-compensated from a cardiovascular standpoint and is medically optimized.   - would proceed with planned surgery on Sunday  - would minimize IVF if at all possible  - management of patient's cardiovascular problems as below    # CAD s/p 3v CABG (1995)  # HFrEF (EF 40-45%)  The patient is currently asymptomatic and euvolemic on exam. His heart failure is well compensated. Pt has been receiving IVF to maintain MAP goal > 70. I recommend using the least amount of IVF possible in order to maintain euvolemia and avoid volume overload.  He is easily hitting his MAP goals, and would  discontinue them if the primary team is comfortable with doing so.   - continue atenolol  - minimize IV fluids; consider stopping them altogether   - daily weights  - diabetic/low sodium diet    # PAD s/p bilateral iliac artery stents (2013) and LLE bypass (2014)  The patient's home ASA and plavix are being held in anticipation of surgery 2/12/17. Will defer to Neurosurgery on this decision. However, I recommend restarting aspirin as soon as is deemed safe following the surgery. Now that we are 3+ years out from the patient's stenting procedure, dual antiplatelet therapy becomes less essential. In anticipation of likely DVT ppx as well, it would be reasonable to continue to hold plavix post-operatively.    - recommend restarting aspirin as soon as is deemed safe by primary team  - can hold plavix post-operatively  - will discuss DVT ppx with you post-op    # DM2, insulin dependent  Had just been started on lantus (5 units) prior to admission. Unlikely that we will start long acting insulin in the preoperative period. Will tolerate some slightly higher blood sugars. May consider starting low dose lantus post-op.   - hold home oral hypoglycemics  - continue insulin sliding scale for now  - diabetic/low sodium diet    # Constipation:  - miralax, senna/docusate  - bisacodyl suppository daily  - may try enema if constipation continues    CODE: Full   Diet/IVF: Diabetic, low sodium diet  DVT ppx: SCDs.     Thank you for involving us in the care of Mr. Manning. We will continue to follow along with you.     Pt's care was discussed with bedside RN.      Norberto Rosas MD  [facility]   Pager: [phone]    Team: Liv Foster 1  Page Cross Cover after 5 pm: pager [phone]     ___________________________________________________________________    Subjective & Interval Hx:    Nursing notes reviewed.  PICC line placed yesterday. Pt transferred to .   Continues to have back pain.  "  No chest pain or SOB. Reports a \"diagonal\" strip of intermittent discomfort across his upper abdomen that tends to be positional - none currently.  Occasional cough.   No BM yet.     Medications: Reviewed in EPIC.     Physical Exam:    Blood pressure 154/69, pulse 79, temperature 98.9  F (37.2  C), temperature source Oral, resp. rate 16, SpO2 93 %.    General: NAD. Pleasant.  HEENT: No icterus or injection. EOMI.    Chest/Resp: Lungs CTAB anteriorly. Nonlabored breathing.    Heart/CV: RRR. Normal S1/S2. No extra heart sounds. Non-displaced PMI. No JVD. No LE edema.  Abdomen/GI: Soft, nontender, nondistended.    : Dugan removed.   Extremities/MSK: Warm, well perfused.    Skin: No rashes.    Neuro: Alert. Normal speech.   Psych: Appropriate mood.       Labs & Studies of Note: I personally all lab and imaging studies in the last 24h.     All cultures:    Recent Labs  Lab 02/09/17  2329 02/09/17  2206 02/09/17  0334 02/09/17  0153 02/08/17  1945   CULT No growth after 12 hours No growth after 14 hours Culture negative monitoring continues  Moderate growth Gram positive cocci in chains* Culture negative monitoring continues  Culture negative monitoring continues Cultured on the 1st day of incubation: Streptococcus mitis group Referred to research section for identificationCritical Value/Significant Value, preliminary result only, called to and read back by Florin Villegas RN 4A @ 1609 2/9/17 CSSusceptibility testing done on previous specimen*  Cultured on the 1st day of incubation: Streptococcus mitis group Referred to research section for identificationCritical Value/Significant Value, preliminary result only, called to and read back by Florin Villegas RN 4A @ 1609 2/9/17 CSSusceptibility testing in progress(Note)POSITIVE for STREPTOCOCCUS SPECIES OTHER THAN pneumococcus, anginosusgroup, S. pyogenes and S. agalactiae. Performed using [employer] nucleic acid test. Final identification and " antimicrobialsusceptibility testing will be verified by standard methods.Specimen tested with [employer] IS[employer]ene multiplex, gram-positive blood culturenucleic acid test for the following targets: Staph aureus, Staphepidermidis, Staph lugdunensis, other Staph species, Enterococcusfaecalis, Enterococcus faecium, Streptococcus species, S. agalactiae,S. anginosus grp., S. pneumoniae, S. pyogenes, Listeria sp., mecA(methicillin resistance) and Mckenna/B (vancomycin resistance).Critical Value/Significant Value called  to and read back by Darryl ARZOLA @1914 2/9/17. ct*

## 2017-02-12 ENCOUNTER — APPOINTMENT (OUTPATIENT)
Dept: GENERAL RADIOLOGY | Facility: CLINIC | Age: 77
DRG: 456 | End: 2017-02-12
Attending: NEUROLOGICAL SURGERY
Payer: MEDICARE

## 2017-02-12 ENCOUNTER — ANESTHESIA (OUTPATIENT)
Dept: SURGERY | Facility: CLINIC | Age: 77
DRG: 456 | End: 2017-02-12
Payer: MEDICARE

## 2017-02-12 ENCOUNTER — ANESTHESIA EVENT (OUTPATIENT)
Dept: SURGERY | Facility: CLINIC | Age: 77
DRG: 456 | End: 2017-02-12
Payer: MEDICARE

## 2017-02-12 LAB
ABO + RH BLD: NORMAL
ABO + RH BLD: NORMAL
APTT PPP: 33 SEC (ref 22–37)
BASE DEFICIT BLDA-SCNC: 1 MMOL/L
BASE DEFICIT BLDA-SCNC: 1.4 MMOL/L
BASE EXCESS BLDA CALC-SCNC: 0.5 MMOL/L
BLD GP AB SCN SERPL QL: NORMAL
BLD PROD TYP BPU: NORMAL
BLD UNIT ID BPU: 0
BLD UNIT ID BPU: 0
BLOOD BANK CMNT PATIENT-IMP: NORMAL
BLOOD PRODUCT CODE: NORMAL
BLOOD PRODUCT CODE: NORMAL
BPU ID: NORMAL
BPU ID: NORMAL
CA-I BLD-MCNC: 4.9 MG/DL (ref 4.4–5.2)
CA-I BLD-MCNC: 5.2 MG/DL (ref 4.4–5.2)
CA-I BLD-MCNC: 5.3 MG/DL (ref 4.4–5.2)
FIBRINOGEN PPP-MCNC: 446 MG/DL (ref 200–420)
GLUCOSE BLD-MCNC: 188 MG/DL (ref 70–99)
GLUCOSE BLD-MCNC: 193 MG/DL (ref 70–99)
GLUCOSE BLD-MCNC: 207 MG/DL (ref 70–99)
HCO3 BLD-SCNC: 24 MMOL/L (ref 21–28)
HCO3 BLD-SCNC: 24 MMOL/L (ref 21–28)
HCO3 BLD-SCNC: 25 MMOL/L (ref 21–28)
HGB BLD-MCNC: 8.2 G/DL (ref 13.3–17.7)
HGB BLD-MCNC: 8.6 G/DL (ref 13.3–17.7)
HGB BLD-MCNC: 8.7 G/DL (ref 13.3–17.7)
INR PPP: 1.22 (ref 0.86–1.14)
NUM BPU REQUESTED: 2
O2/TOTAL GAS SETTING VFR VENT: 100 %
O2/TOTAL GAS SETTING VFR VENT: 60 %
O2/TOTAL GAS SETTING VFR VENT: 60 %
PCO2 BLD: 37 MM HG (ref 35–45)
PCO2 BLD: 40 MM HG (ref 35–45)
PCO2 BLD: 41 MM HG (ref 35–45)
PH BLD: 7.38 PH (ref 7.35–7.45)
PH BLD: 7.39 PH (ref 7.35–7.45)
PH BLD: 7.43 PH (ref 7.35–7.45)
PLATELET # BLD AUTO: 144 10E9/L (ref 150–450)
PO2 BLD: 243 MM HG (ref 80–105)
PO2 BLD: 262 MM HG (ref 80–105)
PO2 BLD: 370 MM HG (ref 80–105)
POTASSIUM BLD-SCNC: 3.3 MMOL/L (ref 3.4–5.3)
POTASSIUM BLD-SCNC: 3.4 MMOL/L (ref 3.4–5.3)
POTASSIUM BLD-SCNC: 3.6 MMOL/L (ref 3.4–5.3)
SODIUM BLD-SCNC: 131 MMOL/L (ref 133–144)
SPECIMEN EXP DATE BLD: NORMAL
TRANSFUSION STATUS PATIENT QL: NORMAL
VANCOMYCIN SERPL-MCNC: 15.8 MG/L

## 2017-02-12 PROCEDURE — 12000008 ZZH R&B INTERMEDIATE UMMC

## 2017-02-12 PROCEDURE — 95938 SOMATOSENSORY TESTING: CPT | Performed by: NEUROLOGICAL SURGERY

## 2017-02-12 PROCEDURE — 25000125 ZZHC RX 250: Performed by: NURSE ANESTHETIST, CERTIFIED REGISTERED

## 2017-02-12 PROCEDURE — 8E0WXBZ COMPUTER ASSISTED PROCEDURE OF TRUNK REGION: ICD-10-PCS | Performed by: NEUROLOGICAL SURGERY

## 2017-02-12 PROCEDURE — 25800025 ZZH RX 258: Performed by: NURSE ANESTHETIST, CERTIFIED REGISTERED

## 2017-02-12 PROCEDURE — 27211020 ZZHC OR CELL SAVER OPNP: Performed by: NEUROLOGICAL SURGERY

## 2017-02-12 PROCEDURE — 27211022 ZZHC OR IOM SUPPLIES OPNP: Performed by: NEUROLOGICAL SURGERY

## 2017-02-12 PROCEDURE — 36415 COLL VENOUS BLD VENIPUNCTURE: CPT | Performed by: NEUROLOGICAL SURGERY

## 2017-02-12 PROCEDURE — C1713 ANCHOR/SCREW BN/BN,TIS/BN: HCPCS | Performed by: NEUROLOGICAL SURGERY

## 2017-02-12 PROCEDURE — 85730 THROMBOPLASTIN TIME PARTIAL: CPT | Performed by: NEUROLOGICAL SURGERY

## 2017-02-12 PROCEDURE — P9016 RBC LEUKOCYTES REDUCED: HCPCS | Performed by: NEUROLOGICAL SURGERY

## 2017-02-12 PROCEDURE — 4A11X4G MONITORING OF PERIPHERAL NERVOUS ELECTRICAL ACTIVITY, INTRAOPERATIVE, EXTERNAL APPROACH: ICD-10-PCS | Performed by: NEUROLOGICAL SURGERY

## 2017-02-12 PROCEDURE — A9270 NON-COVERED ITEM OR SERVICE: HCPCS | Mod: GY | Performed by: STUDENT IN AN ORGANIZED HEALTH CARE EDUCATION/TRAINING PROGRAM

## 2017-02-12 PROCEDURE — 0RG60AJ FUSION OF THORACIC VERTEBRAL JOINT WITH INTERBODY FUSION DEVICE, POSTERIOR APPROACH, ANTERIOR COLUMN, OPEN APPROACH: ICD-10-PCS | Performed by: NEUROLOGICAL SURGERY

## 2017-02-12 PROCEDURE — C9399 UNCLASSIFIED DRUGS OR BIOLOG: HCPCS | Performed by: NURSE ANESTHETIST, CERTIFIED REGISTERED

## 2017-02-12 PROCEDURE — 25000125 ZZHC RX 250: Performed by: ANESTHESIOLOGY

## 2017-02-12 PROCEDURE — 85610 PROTHROMBIN TIME: CPT | Performed by: NEUROLOGICAL SURGERY

## 2017-02-12 PROCEDURE — 82803 BLOOD GASES ANY COMBINATION: CPT | Performed by: NEUROLOGICAL SURGERY

## 2017-02-12 PROCEDURE — 85049 AUTOMATED PLATELET COUNT: CPT | Performed by: NEUROLOGICAL SURGERY

## 2017-02-12 PROCEDURE — 25000125 ZZHC RX 250: Performed by: NEUROLOGICAL SURGERY

## 2017-02-12 PROCEDURE — 25000128 H RX IP 250 OP 636: Performed by: NEUROLOGICAL SURGERY

## 2017-02-12 PROCEDURE — 37000009 ZZH ANESTHESIA TECHNICAL FEE, EACH ADDTL 15 MIN: Performed by: NEUROLOGICAL SURGERY

## 2017-02-12 PROCEDURE — 85384 FIBRINOGEN ACTIVITY: CPT | Performed by: NEUROLOGICAL SURGERY

## 2017-02-12 PROCEDURE — 25000128 H RX IP 250 OP 636: Performed by: NURSE ANESTHETIST, CERTIFIED REGISTERED

## 2017-02-12 PROCEDURE — 27810322 ZZHC OR SPINE - CAGE/SPACER/DISK/CORD/CONNECTOR OPNP: Performed by: NEUROLOGICAL SURGERY

## 2017-02-12 PROCEDURE — 25000132 ZZH RX MED GY IP 250 OP 250 PS 637: Mod: GY | Performed by: STUDENT IN AN ORGANIZED HEALTH CARE EDUCATION/TRAINING PROGRAM

## 2017-02-12 PROCEDURE — 25000565 ZZH ISOFLURANE, EA 15 MIN: Performed by: NEUROLOGICAL SURGERY

## 2017-02-12 PROCEDURE — 25000125 ZZHC RX 250: Performed by: NURSE PRACTITIONER

## 2017-02-12 PROCEDURE — 40000170 ZZH STATISTIC PRE-PROCEDURE ASSESSMENT II: Performed by: NEUROLOGICAL SURGERY

## 2017-02-12 PROCEDURE — 95939 C MOTOR EVOKED UPR&LWR LIMBS: CPT | Performed by: NEUROLOGICAL SURGERY

## 2017-02-12 PROCEDURE — 82330 ASSAY OF CALCIUM: CPT | Performed by: NEUROLOGICAL SURGERY

## 2017-02-12 PROCEDURE — 40000277 XR SURGERY CARM FLUORO LESS THAN 5 MIN W STILLS: Mod: TC

## 2017-02-12 PROCEDURE — 00000146 ZZHCL STATISTIC GLUCOSE BY METER IP

## 2017-02-12 PROCEDURE — 71000014 ZZH RECOVERY PHASE 1 LEVEL 2 FIRST HR: Performed by: NEUROLOGICAL SURGERY

## 2017-02-12 PROCEDURE — 25000128 H RX IP 250 OP 636: Performed by: ANESTHESIOLOGY

## 2017-02-12 PROCEDURE — 84132 ASSAY OF SERUM POTASSIUM: CPT | Performed by: NEUROLOGICAL SURGERY

## 2017-02-12 PROCEDURE — 36000078 ZZH SURGERY LEVEL 6 W FLUORO 1ST 30 MIN - UMMC: Performed by: NEUROLOGICAL SURGERY

## 2017-02-12 PROCEDURE — 25000128 H RX IP 250 OP 636: Performed by: STUDENT IN AN ORGANIZED HEALTH CARE EDUCATION/TRAINING PROGRAM

## 2017-02-12 PROCEDURE — C1762 CONN TISS, HUMAN(INC FASCIA): HCPCS | Performed by: NEUROLOGICAL SURGERY

## 2017-02-12 PROCEDURE — P9041 ALBUMIN (HUMAN),5%, 50ML: HCPCS | Performed by: NURSE ANESTHETIST, CERTIFIED REGISTERED

## 2017-02-12 PROCEDURE — 37000008 ZZH ANESTHESIA TECHNICAL FEE, 1ST 30 MIN: Performed by: NEUROLOGICAL SURGERY

## 2017-02-12 PROCEDURE — 009U0ZX DRAINAGE OF SPINAL CANAL, OPEN APPROACH, DIAGNOSTIC: ICD-10-PCS | Performed by: NEUROLOGICAL SURGERY

## 2017-02-12 PROCEDURE — 0RT90ZZ RESECTION OF THORACIC VERTEBRAL DISC, OPEN APPROACH: ICD-10-PCS | Performed by: NEUROLOGICAL SURGERY

## 2017-02-12 PROCEDURE — 25000128 H RX IP 250 OP 636

## 2017-02-12 PROCEDURE — 25000128 H RX IP 250 OP 636: Performed by: INTERNAL MEDICINE

## 2017-02-12 PROCEDURE — 0RG70K1 FUSION OF 2 TO 7 THORACIC VERTEBRAL JOINTS WITH NONAUTOLOGOUS TISSUE SUBSTITUTE, POSTERIOR APPROACH, POSTERIOR COLUMN, OPEN APPROACH: ICD-10-PCS | Performed by: NEUROLOGICAL SURGERY

## 2017-02-12 PROCEDURE — 27210995 ZZH RX 272: Performed by: NEUROLOGICAL SURGERY

## 2017-02-12 PROCEDURE — 82947 ASSAY GLUCOSE BLOOD QUANT: CPT | Performed by: NEUROLOGICAL SURGERY

## 2017-02-12 PROCEDURE — 25000132 ZZH RX MED GY IP 250 OP 250 PS 637: Mod: GY | Performed by: NEUROLOGICAL SURGERY

## 2017-02-12 PROCEDURE — 27210794 ZZH OR GENERAL SUPPLY STERILE: Performed by: NEUROLOGICAL SURGERY

## 2017-02-12 PROCEDURE — 84295 ASSAY OF SERUM SODIUM: CPT | Performed by: NEUROLOGICAL SURGERY

## 2017-02-12 PROCEDURE — 36000076 ZZH SURGERY LEVEL 6 EA 15 ADDTL MIN - UMMC: Performed by: NEUROLOGICAL SURGERY

## 2017-02-12 PROCEDURE — 25000125 ZZHC RX 250

## 2017-02-12 PROCEDURE — 80202 ASSAY OF VANCOMYCIN: CPT | Performed by: NEUROLOGICAL SURGERY

## 2017-02-12 PROCEDURE — A9270 NON-COVERED ITEM OR SERVICE: HCPCS | Mod: GY | Performed by: NEUROLOGICAL SURGERY

## 2017-02-12 PROCEDURE — 95940 IONM IN OPERATNG ROOM 15 MIN: CPT | Performed by: NEUROLOGICAL SURGERY

## 2017-02-12 PROCEDURE — 99232 SBSQ HOSP IP/OBS MODERATE 35: CPT | Performed by: HOSPITALIST

## 2017-02-12 DEVICE — IMPLANTABLE DEVICE: Type: IMPLANTABLE DEVICE | Site: SPINE THORACIC | Status: FUNCTIONAL

## 2017-02-12 DEVICE — IMP SCR MEDT 5.5/6.0MM SOLERA 6.5X45MM MA 55840006545: Type: IMPLANTABLE DEVICE | Site: SPINE THORACIC | Status: FUNCTIONAL

## 2017-02-12 DEVICE — IMP SCR SET MEDT SOLERA BREAK OFF 5.5MM TI 5540030: Type: IMPLANTABLE DEVICE | Site: SPINE THORACIC | Status: FUNCTIONAL

## 2017-02-12 DEVICE — IMP SCR MEDT 5.5/6.0MM SOLERA 5.5X30MM MA 55840005530: Type: IMPLANTABLE DEVICE | Site: SPINE THORACIC | Status: FUNCTIONAL

## 2017-02-12 DEVICE — IMP SCR MEDT 5.5/6.0MM SOLERA 5.5X50MM MA 55840005550: Type: IMPLANTABLE DEVICE | Site: SPINE THORACIC | Status: FUNCTIONAL

## 2017-02-12 DEVICE — GRAFT BONE CRUSH CANC 30ML 400080: Type: IMPLANTABLE DEVICE | Site: SPINE THORACIC | Status: FUNCTIONAL

## 2017-02-12 DEVICE — IMP SCR MEDT 5.5/6.0MM SOLERA 6.5X50MM MA 55840006550: Type: IMPLANTABLE DEVICE | Site: SPINE THORACIC | Status: FUNCTIONAL

## 2017-02-12 RX ORDER — ONDANSETRON 2 MG/ML
INJECTION INTRAMUSCULAR; INTRAVENOUS PRN
Status: DISCONTINUED | OUTPATIENT
Start: 2017-02-12 | End: 2017-02-12

## 2017-02-12 RX ORDER — SODIUM CHLORIDE, SODIUM LACTATE, POTASSIUM CHLORIDE, CALCIUM CHLORIDE 600; 310; 30; 20 MG/100ML; MG/100ML; MG/100ML; MG/100ML
INJECTION, SOLUTION INTRAVENOUS CONTINUOUS
Status: DISCONTINUED | OUTPATIENT
Start: 2017-02-12 | End: 2017-02-12 | Stop reason: HOSPADM

## 2017-02-12 RX ORDER — SODIUM CHLORIDE, SODIUM LACTATE, POTASSIUM CHLORIDE, CALCIUM CHLORIDE 600; 310; 30; 20 MG/100ML; MG/100ML; MG/100ML; MG/100ML
INJECTION, SOLUTION INTRAVENOUS CONTINUOUS PRN
Status: DISCONTINUED | OUTPATIENT
Start: 2017-02-12 | End: 2017-02-12

## 2017-02-12 RX ORDER — PROPOFOL 10 MG/ML
INJECTION, EMULSION INTRAVENOUS PRN
Status: DISCONTINUED | OUTPATIENT
Start: 2017-02-12 | End: 2017-02-12

## 2017-02-12 RX ORDER — FENTANYL CITRATE 50 UG/ML
INJECTION, SOLUTION INTRAMUSCULAR; INTRAVENOUS PRN
Status: DISCONTINUED | OUTPATIENT
Start: 2017-02-12 | End: 2017-02-12

## 2017-02-12 RX ORDER — BUPIVACAINE HYDROCHLORIDE AND EPINEPHRINE 2.5; 5 MG/ML; UG/ML
INJECTION, SOLUTION INFILTRATION; PERINEURAL PRN
Status: DISCONTINUED | OUTPATIENT
Start: 2017-02-12 | End: 2017-02-12 | Stop reason: HOSPADM

## 2017-02-12 RX ORDER — LABETALOL HYDROCHLORIDE 5 MG/ML
10 INJECTION, SOLUTION INTRAVENOUS
Status: DISCONTINUED | OUTPATIENT
Start: 2017-02-12 | End: 2017-02-12 | Stop reason: HOSPADM

## 2017-02-12 RX ORDER — CALCIUM CHLORIDE 100 MG/ML
INJECTION INTRAVENOUS; INTRAVENTRICULAR PRN
Status: DISCONTINUED | OUTPATIENT
Start: 2017-02-12 | End: 2017-02-12

## 2017-02-12 RX ORDER — CEFTRIAXONE 2 G/1
2 INJECTION, POWDER, FOR SOLUTION INTRAMUSCULAR; INTRAVENOUS EVERY 12 HOURS
Status: DISCONTINUED | OUTPATIENT
Start: 2017-02-12 | End: 2017-02-15 | Stop reason: HOSPADM

## 2017-02-12 RX ORDER — PROPOFOL 10 MG/ML
INJECTION, EMULSION INTRAVENOUS CONTINUOUS PRN
Status: DISCONTINUED | OUTPATIENT
Start: 2017-02-12 | End: 2017-02-12

## 2017-02-12 RX ORDER — ONDANSETRON 4 MG/1
4 TABLET, ORALLY DISINTEGRATING ORAL EVERY 30 MIN PRN
Status: DISCONTINUED | OUTPATIENT
Start: 2017-02-12 | End: 2017-02-12 | Stop reason: HOSPADM

## 2017-02-12 RX ORDER — NICOTINE POLACRILEX 4 MG
15-30 LOZENGE BUCCAL
Status: DISCONTINUED | OUTPATIENT
Start: 2017-02-12 | End: 2017-02-15 | Stop reason: HOSPADM

## 2017-02-12 RX ORDER — ONDANSETRON 2 MG/ML
4 INJECTION INTRAMUSCULAR; INTRAVENOUS EVERY 30 MIN PRN
Status: DISCONTINUED | OUTPATIENT
Start: 2017-02-12 | End: 2017-02-12 | Stop reason: HOSPADM

## 2017-02-12 RX ORDER — VANCOMYCIN HYDROCHLORIDE 1 G/20ML
INJECTION, POWDER, LYOPHILIZED, FOR SOLUTION INTRAVENOUS PRN
Status: DISCONTINUED | OUTPATIENT
Start: 2017-02-12 | End: 2017-02-12 | Stop reason: HOSPADM

## 2017-02-12 RX ORDER — FENTANYL CITRATE 50 UG/ML
25-50 INJECTION, SOLUTION INTRAMUSCULAR; INTRAVENOUS
Status: DISCONTINUED | OUTPATIENT
Start: 2017-02-12 | End: 2017-02-12 | Stop reason: HOSPADM

## 2017-02-12 RX ORDER — DEXTROSE MONOHYDRATE 25 G/50ML
25-50 INJECTION, SOLUTION INTRAVENOUS
Status: DISCONTINUED | OUTPATIENT
Start: 2017-02-12 | End: 2017-02-15 | Stop reason: HOSPADM

## 2017-02-12 RX ORDER — HYDROMORPHONE HYDROCHLORIDE 1 MG/ML
.3-.5 INJECTION, SOLUTION INTRAMUSCULAR; INTRAVENOUS; SUBCUTANEOUS EVERY 5 MIN PRN
Status: DISCONTINUED | OUTPATIENT
Start: 2017-02-12 | End: 2017-02-12 | Stop reason: HOSPADM

## 2017-02-12 RX ORDER — ALBUMIN HUMAN 5 %
INTRAVENOUS SOLUTION INTRAVENOUS PRN
Status: DISCONTINUED | OUTPATIENT
Start: 2017-02-12 | End: 2017-02-12

## 2017-02-12 RX ORDER — EPHEDRINE SULFATE 50 MG/ML
INJECTION, SOLUTION INTRAMUSCULAR; INTRAVENOUS; SUBCUTANEOUS PRN
Status: DISCONTINUED | OUTPATIENT
Start: 2017-02-12 | End: 2017-02-12

## 2017-02-12 RX ADMIN — FENTANYL CITRATE 50 MCG: 50 INJECTION, SOLUTION INTRAMUSCULAR; INTRAVENOUS at 10:01

## 2017-02-12 RX ADMIN — HYDROMORPHONE HYDROCHLORIDE 0.5 MG: 10 INJECTION, SOLUTION INTRAMUSCULAR; INTRAVENOUS; SUBCUTANEOUS at 13:04

## 2017-02-12 RX ADMIN — PHENYLEPHRINE HYDROCHLORIDE 100 MCG: 10 INJECTION, SOLUTION INTRAMUSCULAR; INTRAVENOUS; SUBCUTANEOUS at 08:26

## 2017-02-12 RX ADMIN — CALCIUM CHLORIDE 200 MG: 100 INJECTION, SOLUTION INTRAVENOUS at 09:50

## 2017-02-12 RX ADMIN — PHENYLEPHRINE HYDROCHLORIDE 100 MCG: 10 INJECTION, SOLUTION INTRAMUSCULAR; INTRAVENOUS; SUBCUTANEOUS at 08:37

## 2017-02-12 RX ADMIN — PROPOFOL 75 MCG/KG/MIN: 10 INJECTION, EMULSION INTRAVENOUS at 08:16

## 2017-02-12 RX ADMIN — HYDROMORPHONE HYDROCHLORIDE 0.5 MG: 10 INJECTION, SOLUTION INTRAMUSCULAR; INTRAVENOUS; SUBCUTANEOUS at 14:01

## 2017-02-12 RX ADMIN — HYDROMORPHONE HYDROCHLORIDE 0.5 MG: 10 INJECTION, SOLUTION INTRAMUSCULAR; INTRAVENOUS; SUBCUTANEOUS at 12:41

## 2017-02-12 RX ADMIN — INSULIN ASPART 4 UNITS: 100 INJECTION, SOLUTION INTRAVENOUS; SUBCUTANEOUS at 17:51

## 2017-02-12 RX ADMIN — CEFTRIAXONE 2 G: 2 INJECTION, POWDER, FOR SOLUTION INTRAMUSCULAR; INTRAVENOUS at 15:40

## 2017-02-12 RX ADMIN — ALBUMIN HUMAN 250 ML: 50 SOLUTION INTRAVENOUS at 10:54

## 2017-02-12 RX ADMIN — MIDAZOLAM HYDROCHLORIDE 1 MG: 1 INJECTION, SOLUTION INTRAMUSCULAR; INTRAVENOUS at 08:00

## 2017-02-12 RX ADMIN — CALCIUM CHLORIDE 200 MG: 100 INJECTION, SOLUTION INTRAVENOUS at 09:25

## 2017-02-12 RX ADMIN — ATENOLOL 25 MG: 25 TABLET ORAL at 14:01

## 2017-02-12 RX ADMIN — MEROPENEM 1 G: 1 INJECTION, POWDER, FOR SOLUTION INTRAVENOUS at 08:57

## 2017-02-12 RX ADMIN — MEROPENEM 1 G: 1 INJECTION, POWDER, FOR SOLUTION INTRAVENOUS at 00:01

## 2017-02-12 RX ADMIN — FENTANYL CITRATE 50 MCG: 50 INJECTION, SOLUTION INTRAMUSCULAR; INTRAVENOUS at 12:25

## 2017-02-12 RX ADMIN — ROCURONIUM BROMIDE 10 MG: 10 INJECTION INTRAVENOUS at 10:00

## 2017-02-12 RX ADMIN — OXYCODONE HYDROCHLORIDE 10 MG: 5 TABLET ORAL at 15:35

## 2017-02-12 RX ADMIN — HYDROMORPHONE HYDROCHLORIDE 0.5 MG: 10 INJECTION, SOLUTION INTRAMUSCULAR; INTRAVENOUS; SUBCUTANEOUS at 14:52

## 2017-02-12 RX ADMIN — PHENYLEPHRINE HYDROCHLORIDE 200 MCG: 10 INJECTION, SOLUTION INTRAMUSCULAR; INTRAVENOUS; SUBCUTANEOUS at 09:29

## 2017-02-12 RX ADMIN — HYDROMORPHONE HYDROCHLORIDE 0.5 MG: 10 INJECTION, SOLUTION INTRAMUSCULAR; INTRAVENOUS; SUBCUTANEOUS at 12:34

## 2017-02-12 RX ADMIN — CEFAZOLIN SODIUM 2 G: 2 INJECTION, SOLUTION INTRAVENOUS at 08:57

## 2017-02-12 RX ADMIN — SENNOSIDES AND DOCUSATE SODIUM 2 TABLET: 8.6; 5 TABLET ORAL at 19:49

## 2017-02-12 RX ADMIN — SENNOSIDES AND DOCUSATE SODIUM 2 TABLET: 8.6; 5 TABLET ORAL at 14:51

## 2017-02-12 RX ADMIN — FENTANYL CITRATE 50 MCG: 50 INJECTION, SOLUTION INTRAMUSCULAR; INTRAVENOUS at 09:13

## 2017-02-12 RX ADMIN — PROPOFOL 100 MG: 10 INJECTION, EMULSION INTRAVENOUS at 08:00

## 2017-02-12 RX ADMIN — SUGAMMADEX 100 MG: 100 INJECTION, SOLUTION INTRAVENOUS at 12:12

## 2017-02-12 RX ADMIN — Medication 10 MG: at 08:37

## 2017-02-12 RX ADMIN — ACETAMINOPHEN 650 MG: 325 TABLET, FILM COATED ORAL at 14:01

## 2017-02-12 RX ADMIN — HYDROMORPHONE HYDROCHLORIDE 0.5 MG: 10 INJECTION, SOLUTION INTRAMUSCULAR; INTRAVENOUS; SUBCUTANEOUS at 02:00

## 2017-02-12 RX ADMIN — CYCLOBENZAPRINE HYDROCHLORIDE 5 MG: 5 TABLET, FILM COATED ORAL at 14:51

## 2017-02-12 RX ADMIN — CALCIUM CHLORIDE 200 MG: 100 INJECTION, SOLUTION INTRAVENOUS at 08:41

## 2017-02-12 RX ADMIN — PHENYLEPHRINE HYDROCHLORIDE 0.3 MCG/KG/MIN: 10 INJECTION, SOLUTION INTRAMUSCULAR; INTRAVENOUS; SUBCUTANEOUS at 08:43

## 2017-02-12 RX ADMIN — CALCIUM CHLORIDE 200 MG: 100 INJECTION, SOLUTION INTRAVENOUS at 08:39

## 2017-02-12 RX ADMIN — HYDROMORPHONE HYDROCHLORIDE 0.5 MG: 10 INJECTION, SOLUTION INTRAMUSCULAR; INTRAVENOUS; SUBCUTANEOUS at 12:57

## 2017-02-12 RX ADMIN — VANCOMYCIN HYDROCHLORIDE 1500 MG: 10 INJECTION, POWDER, LYOPHILIZED, FOR SOLUTION INTRAVENOUS at 01:19

## 2017-02-12 RX ADMIN — ROCURONIUM BROMIDE 20 MG: 10 INJECTION INTRAVENOUS at 08:54

## 2017-02-12 RX ADMIN — SUGAMMADEX 100 MG: 100 INJECTION, SOLUTION INTRAVENOUS at 12:07

## 2017-02-12 RX ADMIN — SODIUM CHLORIDE, PRESERVATIVE FREE 5 ML: 5 INJECTION INTRAVENOUS at 17:41

## 2017-02-12 RX ADMIN — OXYCODONE HYDROCHLORIDE 10 MG: 5 TABLET ORAL at 19:49

## 2017-02-12 RX ADMIN — ATORVASTATIN CALCIUM 40 MG: 40 TABLET, FILM COATED ORAL at 14:51

## 2017-02-12 RX ADMIN — REMIFENTANIL HYDROCHLORIDE 0.05 MCG/KG/MIN: 1 INJECTION, POWDER, LYOPHILIZED, FOR SOLUTION INTRAVENOUS at 08:16

## 2017-02-12 RX ADMIN — CEFAZOLIN SODIUM 1 G: 2 INJECTION, SOLUTION INTRAVENOUS at 11:00

## 2017-02-12 RX ADMIN — ONDANSETRON 4 MG: 2 INJECTION INTRAMUSCULAR; INTRAVENOUS at 11:23

## 2017-02-12 RX ADMIN — ROCURONIUM BROMIDE 30 MG: 10 INJECTION INTRAVENOUS at 09:03

## 2017-02-12 RX ADMIN — PHENYLEPHRINE HYDROCHLORIDE 100 MCG: 10 INJECTION, SOLUTION INTRAMUSCULAR; INTRAVENOUS; SUBCUTANEOUS at 11:17

## 2017-02-12 RX ADMIN — FENTANYL CITRATE 50 MCG: 50 INJECTION, SOLUTION INTRAMUSCULAR; INTRAVENOUS at 10:34

## 2017-02-12 RX ADMIN — CALCIUM CHLORIDE 200 MG: 100 INJECTION, SOLUTION INTRAVENOUS at 09:33

## 2017-02-12 RX ADMIN — FENTANYL CITRATE 50 MCG: 50 INJECTION, SOLUTION INTRAMUSCULAR; INTRAVENOUS at 12:21

## 2017-02-12 RX ADMIN — PHENYLEPHRINE HYDROCHLORIDE 100 MCG: 10 INJECTION, SOLUTION INTRAMUSCULAR; INTRAVENOUS; SUBCUTANEOUS at 08:31

## 2017-02-12 RX ADMIN — PROPOFOL 30 MG: 10 INJECTION, EMULSION INTRAVENOUS at 10:03

## 2017-02-12 RX ADMIN — TAMSULOSIN HYDROCHLORIDE 0.8 MG: 0.4 CAPSULE ORAL at 22:43

## 2017-02-12 RX ADMIN — SODIUM CHLORIDE, POTASSIUM CHLORIDE, SODIUM LACTATE AND CALCIUM CHLORIDE: 600; 310; 30; 20 INJECTION, SOLUTION INTRAVENOUS at 07:50

## 2017-02-12 RX ADMIN — Medication 10 MG: at 10:53

## 2017-02-12 ASSESSMENT — VISUAL ACUITY
OU: NORMAL ACUITY

## 2017-02-12 ASSESSMENT — LIFESTYLE VARIABLES: TOBACCO_USE: 1

## 2017-02-12 ASSESSMENT — COPD QUESTIONNAIRES: COPD: 1

## 2017-02-12 NOTE — OP NOTE
PREOPERATIVE DIAGNOSIS:    1.  Thoracic spine instability.   2.  Osteomyelitis and discitis T9-T10 s/p laminectomy.      POSTOPERATIVE DIAGNOSIS:    1.  Thoracic spine instability.   2.  Osteomyelitis and discitis T9-T10 s/p laminectomy.         PROCEDURES PERFORMED:   1.  T7-T12 posterior segmental instrumentation.   2.  Transpedicular, transforaminal right-sided T9-T10 interbody fusion.   3.  Posterior arthrodesis T7-T12.  4.  Use of intraoperative neuro-navigation.    5.  Use of intraoperative O-arm.   6.  Use of intraoperative cell saver.     7.  Use of allograft bone.      ATTENDING SURGEON:  Marcelo Trent MD      ASSISTANT:  Kunal Ramos MD      ANESTHESIA:  General endotracheal anesthesia.      Position prone on Bradley bed.      OPERATIVE INDICATIONS:  Mr. Tad Manning is a 76-year-old male who presented to the Neurosurgery Service at the [facility] with concerns of loss of ambulation over days.  He had fallen in the early part of 02/2017 and had weakness in bilateral extremities with difficulty ambulation which progressed to complete inability to ambulate.  On imaging of his spine, he was found to have an infection spondylitis at T9-T10 with a paraspinal abscess.  For spinal cord compression, he underwent a T9-T10 laminectomy and partial T9 transpedicular disk space abscess removal on 02/09/2017.  He subsequently was started on antibiotics and was followed by Infectious Disease team.  For concerns of thoracic instability, he was then electively brought to the operating room today on 02/12/2017 for posterior stabilization.      SUMMARY:  Mr. Manning was brought to the operating room by anesthesia colleagues.  He underwent endotracheal anesthesia, an arterial line and schuler catheter was placed. Baseline neuro monitoring was obtained that showed that he had good monitoring and motor evoked potentials in upper extremities but nothing in the lower extremities.  As a result of  this, no further monitoring was performed for the case.  He was then positioned prone on the Bradley table with arms forward.  All the pressure points were appropriately padded and he received a dose of perioperative antibiotics.  His previous laminectomy incision stitches were removed and sterile prepping and draping was completed, and timeout was performed.      A #10 blade was used to extend the previous laminectomy incision about 3 cm in cephalad caudal direction and monopolar cautery used for the dissection.  The spinous process of what were  Identified as T7, T8, T11, and T12 were exposed completely.  The T9-T10 spinous process and lamina were removed during the previous operation and so a defect in the midline was noted and thecal sac identified.  Dissection was performed in the  subperiosteal manner using monopolar cautery, to expose the lamina and  superior articular facets of T7, T8, T11, and T12 bilaterally.  The superior articular facet between T9-T10 was also exposed bilaterally.  Then, intraoperative O-arm was brought to the surgical field and sterile frame was placed on the inferior aspect of the spinous process of T12 and an O-arm spin performed.  Using neuro-navigation with Stealth probe and after tapping and checking with a round tip ball to confirm no breach in pedicle wall, pedicle screws were placed at T12 bilaterally which were 7.5 x 45 on the left and 7.5 x 15 mm on the right.  At the [id number] screws were placed, which was measuring 6.5 x 45, on the left and 7.5 x 50 on the right.  On T10 on the left side 5.5 x 30 screw was placed on the right side, could not be placed due to poor quality of the pedicle seen on the CT.  At T9, 7.5 x 45 on the left side and 7.5 x 50 on the right was placed.  At T8, 6.5 x 45 on the left and 6.5 x 50 on the right was placed.  At T7, 6.5 x 45 on the left and 5.5 x 50 the right was placed.  Then, an intraoperative spin was once again performed that confirmed good  placement of the screws. A temporary kraig was placed on the left side and set screws were placed but not tightened      Then, the facets between T7-T8, T8-T9, T11-T12 were removed bilaterally with the help of an osteotome.  Then at T9-T10 level on the right side the facet was drilled off along with the part of the pedicle which was remaining.  Then, the T10 nerve root was clearly identified going laterally and above this the disk space was entered with a pituitary rongeur.  The remaining portion of the disk along with granulation tissue was removed using a combination of downgoing curet and Kerrison 3 and 4 punches.  Then, sizing of the implant was performed under xrays and a 15 mm implant was placed which was filled up with crushed allograft bone into the T9-T10 disk space.  Intraoperative x-rays were taken to confirm good placement of this and O-arm was then removed from the surgical field. On the right side the kraig was measured and placed with set screws.  The set screws were then locked and confirmatory AP and lateral x-rays were taken.      Irrigation was then performed vigorously in the surgical field and decortication of bone performed and remaining allograft bone was placed along the rods bilaterally.  Bipolar cautery was used for hemostasis and then 2 grams of vancomycin powder was placed along the spinous process in the surgical bed.  Hemovac drain was placed in the subfascial space which was tunneled about 3 cm away from the incision and secured to the skin with 3-0 nylon stitches.  Then, the paraspinal musclee were brought together with 0 Vicryl stitches, the fascia brought together with 0 Vicryl stitches in an interrupted water-tight manner.  The subcutaneous tissue was closed with 2-0 Vicryl in an inverted interrupted manner and the skin itself was closed with 3-0 nylon in running continuous manner.  Wet and dry dressing were applied, ChloraPrep was then used to clean the incision site and an island  dressing was placed on top. Needle and sponge count was correct x2 at the end of the procedure.      Dr. Bart Oswald was present and scrubbed in for most of the procedure including all its critical portions and immediately available for the rest of it.         BART OSWALD MD       As dictated by BELEN GODINEZ MD            D: 2017 12:25   T: 2017 14:52   MT: ANTHONY      Name:     PETER BUTLER   MRN:      [id number]        Account:        [id number]   :      1940           Procedure Date: 2017      Document: [id number]

## 2017-02-12 NOTE — ANESTHESIA PROCEDURE NOTES
Arterial Line Procedure Note  Staff:     Anesthesiologist:  HUNTER GILBERT  Location: In OR After Induction  Procedure Start/Stop Times:     patient identified, IV checked and site marked      Correct Patient: Yes      Correct Position: Yes      Correct Laterality:  N/A    Site Marked:  Yes  Line Placement:     Procedure:  Arterial Line    Insertion Site:  Radial    Insertion laterality:  Left    Skin Prep: Chloraprep      Patient Prep: mask, sterile gloves and hand hygiene      Local skin infiltration:  None    Ultrasound Guided?: No      Catheter size:  20 gauge, Quick cath    Dressing:  Tegaderm    Complications:  None obvious    Arterial waveform: Yes      IBP within 10% of NIBP: Yes

## 2017-02-12 NOTE — BRIEF OP NOTE
[facility], [place]    Brief Operative Note    Pre-operative diagnosis: THoracic instability   Post-operative diagnosis Same  Procedure(s):  T7-12 Posterior Fusion, transforaminal lumbar interbody thoracic levels 9-10 fusion stelth-giuded - Wound Class: I-Clean  Use of intra-op cell saver  Use of intra-op O-arm  Surgeon: :     * Marcelo Trent MD - Primary     * Kunal Ramos MD - Resident - Assisting  Anesthesia: General   Estimated blood loss: Less than 100 ml  Drains: Hemovac  Specimens: * No specimens in log *  Findings:   Good placement of pedicle screws confirmed by O-arm and Transpedicular inmplant placement between T9--10.  Complications: None.  Implants: See op note

## 2017-02-12 NOTE — ANESTHESIA POSTPROCEDURE EVALUATION
Patient: Tad Manning    Procedure(s):   ORARM, Monitoring, Medtronic, T7-12 Posterior Fusion, transforaminal lumbar interbody thoracic levels 9-10 fusion stelth-giuded - Wound Class: I-Clean    Diagnosis:Epidural Abscess  Diagnosis Additional Information: No value filed.    Anesthesia Type:  General, ETT    Note:  Anesthesia Post Evaluation    Patient location during evaluation: PACU  Patient participation: Able to fully participate in evaluation  Level of consciousness: awake and confused  Pain management: satisfactory to patient  Airway patency: patent  Cardiovascular status: acceptable  Respiratory status: acceptable  Hydration status: acceptable  PONV: none     Anesthetic complications: None          Last vitals:  Vitals:    02/12/17 1230 02/12/17 1245 02/12/17 1300   BP: (!) 169/93 (!) 157/120 156/82   Pulse:      Resp: 18 18 18   Temp:  36.7  C (98.1  F) 36.7  C (98.1  F)   SpO2: 99% 98%          Electronically Signed By: Franco Louise MD  February 12, 2017  1:06 PM

## 2017-02-12 NOTE — PLAN OF CARE
Problem: Spinal Cord Injury (Acute and Chronic) (Adult)  Goal: Signs and Symptoms of Listed Potential Problems Will be Absent or Manageable (Spinal Cord Injury)  Signs and symptoms of listed potential problems will be absent or manageable by discharge/transition of care (reference Spinal Cord Injury (Acute and Chronic) (Adult) CPG).   Outcome: No Change  VSS on RA. AO x4. Cont numbness to r hand from baseline and numbness/absent sensation below the knee bilaterally. BUE 5/5 RLE 1/5; able to plantarflex weakly today. LLE slightly stronger 1/5 with a moderate dorsi and plantar flexion. Trace edema BLE. PCDs on. Pt continues to have significant pain with repositioning; tylenol. Oxy, flexaril and occasional IV dilaudid used (premedicated before turning). He wore his TLSO brace and a mechanical lift to get up to the commode. He has been unable to void at all after schuler removal and requires st cath. He has a small liquid BM after suppository and clear water results after tap water enema. +BS, no BM x6 days per patient. Hemovac has no output but remains intact and to suction. Back dressing CDI. Pt needs surgical scrubs tonight for surgery at 0730.

## 2017-02-12 NOTE — ANESTHESIA CARE TRANSFER NOTE
Patient: Tad Manning    Procedure(s):   ORARM, Monitoring, Medtronic, T7-12 Posterior Fusion, transforaminal lumbar interbody thoracic levels 9-10 fusion stelth-giuded - Wound Class: I-Clean    Diagnosis: Epidural Abscess  Diagnosis Additional Information: No value filed.    Anesthesia Type:   General, ETT     Note:  Airway :Nasal Cannula  Patient transferred to:PACU  Comments: VSS, Report to RN.      Vitals: (Last set prior to Anesthesia Care Transfer)    CRNA VITALS  2/12/2017 1149 - 2/12/2017 1227      2/12/2017             Pulse: 104    SpO2: 99 %    Resp Rate (set): 10                Electronically Signed By: OFELIA Mcghee CRNA  February 12, 2017  12:27 PM

## 2017-02-12 NOTE — PROGRESS NOTES
Hospital Medicine  Follow-up Consult Note  Date of Service: February 12, 2017    Patient: Tad Manning  MRN: [id number]  Admission Date: 2/9/2017  Hospital Day # 3      Assessment & Plan: Tad Manning is a 76 year old man with a history of CAD s/p 3v CABG (1995), PAD s/p bilateral LE stents,  IDDM2,  CLL in remission, admitted to the Neurosurgical service from OSH with T9-10 discitis and epidural absecess s/p T9-10 laminectomy for spinal cord decompression. Admission blood cultures growing strep mitis.  Now s/p T7-12 posterior fusion with TLIF T9-10.     # T9-10 discitis w/ epidural abscess s/p laminectomy s/p T7-T12 posterior fusion w/ TLIF T9-10  # Streptococcus mitis bacteremia    - agree with ceftriaxone 2g IV q12h  - pain control per primary team    # CAD s/p 3v CABG (1995)  # HFrEF (EF 40-45%)  Remains asymptomatic post-operatively. Appears euvolemic. Heart failure currently compensated. He returned to the floor on 2L NC, but this can likely be weaned off.   - continue atenolol  - daily weight  - diabetic/low sodium diet when appropriate post-op    # PAD s/p bilateral iliac artery stents (2013) and LLE bypass (2014)  The patient's home ASA and plavix have been held since admission in anticipation of surgery. Recommend restarting aspirin as soon as it is safe from post-op bleeding standpoint.  Now that we are 3+ years out from the patient's stenting procedure, dual antiplatelet therapy becomes less essential. In anticipation of likely DVT ppx as well, it would be reasonable to continue to hold plavix post-operatively.    - recommend restarting aspirin as soon as is deemed safe by primary team  - can hold plavix for now  - timing of DVT ppx per primary team    # DM2, insulin dependent  Had just been started on lantus (5 units) prior to admission. A1c 6.1%. Sugars running high pre-operatively likely due to frequent snacking/Ensure intake.   - continue sliding scale for now  - will likely start  long acting insulin tomorrow once PO intake increases  - hold home oral hypoglycemics  - diabetic/low sodium diet when appropriate    # Constipation:  - miralax, senna/docusate  - bisacodyl suppository daily  - enema prn    CODE: Full   Diet/IVF: Diabetic, low sodium diet when appropriate  DVT ppx: SCDs. Pharm ppx timing deferred to primary team.     Thank you for involving us in the care of Mr. Manning. We will continue to follow along with you.     Pt's care was discussed with bedside RN.      Norberto Rosas MD  [facility]   Pager: [phone]    Team: Liv Foster 1  Page Cross Cover after 5 pm: pager [phone]     ___________________________________________________________________    Subjective & Interval Hx:    Nursing notes reviewed.  Per RN, sugars last night likely affected by frequent Ensure intake.   Underwent surgery this morning. No immediate complications.   Continues to have low back pain.  Strength in RLE improved.  Denies any chest pain or shortness of breath.   Has some right sided rib/RUQ pain.     Medications: Reviewed in EPIC.     Physical Exam:    Blood pressure 165/80, pulse 71, temperature 98.1  F (36.7  C), temperature source Oral, resp. rate 18, SpO2 98 %.    General: NAD. Pleasant.  HEENT: No icterus or injection.   Chest/Resp: Lungs CTAB anteriorly. Nonlabored breathing.    Heart/CV: RRR. Normal S1/S2. No extra heart sounds. Non-displaced PMI. No JVD. No LE edema.  Abdomen/GI: Soft, nontender, nondistended.    : Dugan removed.   Extremities/MSK: Warm, well perfused.    Skin: No rashes.    Neuro: Alert. Oriented x3. Normal speech. RLE plantar/dorsiflexion now 3/5.   Psych: Appropriate mood.       Labs & Studies of Note: I personally all lab and imaging studies in the last 24h.     All cultures:    Recent Labs  Lab 02/09/17  3829 02/09/17  2206 02/09/17  0334 02/09/17  0153 02/08/17  1945   CULT No growth after 2 days No growth after 3 days  Moderate growth Streptococcus mitis groupThese bacteria are part of normal skin himanshu, but on occasion, may be true pathogens.  Clinical correlation must be applied to interpreting this microbiology result.*  Culture negative monitoring continues On day 2, isolated in broth only: Streptococcus mitis group Susceptibility testing done on previous specimenCulture in progress*  Culture negative monitoring continues Cultured on the 1st day of incubation: Streptococcus mitis group Referred to research section for identificationCritical Value/Significant Value, preliminary result only, called to and read back by Florin Villegas RN 4A @ 1474 2/9/17 CS(Note)POSITIVE for STREPTOCOCCUS SPECIES OTHER THAN pneumococcus, anginosusgroup, S. pyogenes and S. agalactiae. Performed using [facility] [employer]iplex nucleic acid test. Final identification and antimicrobialsusceptibility testing will be verified by standard methods.Specimen tested with [employer] multiplex, gram-positive blood culturenucleic acid test for the following targets: Staph aureus, Staphepidermidis, Staph lugdunensis, other Staph species, Enterococcusfaecalis, Enterococcus faecium, Streptococcus species, S. agalactiae,S. anginosus grp., S. pneumoniae, S. pyogenes, Listeria sp., mecA(methicillin resistance) and Mckenna/B (vancomycin resistance).Critical Value/Significant Value called to and read back by Darryl  RN @8794 2/9/17. ct*  Cultured on the 1st day of incubation: Streptococcus mitis group Referred to research section for identificationCritical Value/Significant Value, preliminary result only, called to and read back by Florin Villegas RN 4A @ 7670 2/9/17 CSSusceptibility testing done on previous specimen*

## 2017-02-12 NOTE — ANESTHESIA PREPROCEDURE EVALUATION
"Anesthesia Evaluation     . Pt has had prior anesthetic. Type: General    No history of anesthetic complications     ROS/MED HX    ENT/Pulmonary:     (+)tobacco use, Past use COPD, , . .    Neurologic:       Cardiovascular: Comment: Pericardial effusion    (+) Dyslipidemia, hypertension-Peripheral Vascular Disease (s/p b/l LE stents)-CAD, --. Taking blood thinners : . . . :. .       METS/Exercise Tolerance:     Hematologic: Comments: RBC antigens        Musculoskeletal:         GI/Hepatic:         Renal/Genitourinary:     (+) chronic renal disease, BPH,       Endo:     (+) type II DM Diabetic complications: neuropathy, .      Psychiatric:         Infectious Disease: Comment: Epidural/paraspinal abscess - current    H/o Osteomyelitis - L foot            Malignancy:   (+) Malignancy History of Lymphoma/Leukemia  CLL, not in remission        Other:             Procedure: Procedure(s):  Stealth, ORARM, Monitoring, Medtronic, T7-12 Posterior Fusion, TLIFT 9-10 - Wound Class: I-Clean    HPI: 76 year old male requires anesthesia for Procedure(s):  Stealth, ORARM, Monitoring, Medtronic, T7-12 Posterior Fusion, TLIFT 9-10 - Wound Class: I-Clean.     \"Tad Manning is a 76 year old male with CAD, DM with neuropathy, HTN, PVD s/p bilateral LE stents on ASA/plvx, Chronic lymphocytic leukemia in remission who presents after a fall and is found to have infectious spondylitis at T9-10 with paraspinal abscesses, retrolisthesis and epidural inflammation resulting in severe canal stenosis and cord impingement. On 2/9 he underwent T9-10 laminectomy for spinal cord decompression. He is improving post operatively. Blood cultures are growing GPCs in pairs and chains.\"      PMH/PSH:  Past Medical History   Diagnosis Date     Arthritis      ASCVD (arteriosclerotic cardiovascular disease)      BMI 30.0-30.9,adult      BPH (benign prostatic hypertrophy)      Cellulitis      Chronic lymphocytic leukemia of B-cell type not having achieved " remission (H)      Coronary artery disease      triple bypass 1995     Diabetes mellitus (H)      Diabetic polyneuropathy (H)      History of blood transfusion      Hyperlipidaemia      Hypertension      Malignant neoplasm (H)      CLL     Noninflammatory pericardial effusion      Osteomyelitis of left foot (H)      PVD (peripheral vascular disease) (H)      Sebaceous cyst        Past Surgical History   Procedure Laterality Date     Tonsillectomy       Bone marrow biopsy, bone specimen, needle/trocar  10/19/2012     Procedure: BIOPSY BONE MARROW;  BONE MARROW BIOPSY  (CONSCIOUS SED);  Surgeon: Ramon Quesada MD;  Location:  GI     Vascular surgery       ptcr legs     Bypass graft femoropopliteal  1/10/2014     Procedure: BYPASS GRAFT FEMOROPOPLITEAL;  Left above knee popliteal to  Below knee popliteal bypass using transverse saphenous vein graft. Left 2nd Toe amputation;  Surgeon: Amador Vick MD;  Location:  OR     Amputate toe(s)  1/10/2014     Procedure: AMPUTATE TOE(S);;  Surgeon: Amador Vick MD;  Location:  OR     Graft vein from extremity (location)  1/10/2014     Procedure: GRAFT VEIN FROM EXTREMITY (LOCATION);;  Surgeon: Amador Vick MD;  Location:  OR     Cardiac surgery       angioplasty with stent, triple bypass     Excise cyst generic (location) N/A 2/1/2016     Procedure: EXCISE CYST GENERIC (LOCATION);  Surgeon: Amador Vick MD;  Location:  [facility]     Laminectomy thoracic one level N/A 2/8/2017     Procedure: LAMINECTOMY THORACIC ONE LEVEL;  Surgeon: Marcelo Trent MD;  Location: [facility] OR         No current facility-administered medications on file prior to encounter.   Current Outpatient Prescriptions on File Prior to Encounter:  oxyCODONE (ROXICODONE) 5 MG IR tablet Take 1-2 tablets (5-10 mg) by mouth every 3 hours as needed for moderate to severe pain   insulin aspart (NOVOLOG PEN) 100 UNIT/ML injection Inject 1-6 Units Subcutaneous every 4 hours   insulin  glargine (LANTUS) 100 UNIT/ML injection Inject 5 Units Subcutaneous At Bedtime   lidocaine (LIDODERM) 5 % Patch Place 1 patch onto the skin daily as needed for moderate pain to back for back pain   atenolol (TENORMIN) 25 MG tablet Take 1 tablet (25 mg) by mouth daily   polyethylene glycol (MIRALAX/GLYCOLAX) Packet Take 17 g by mouth daily as needed for constipation   senna-docusate (SENOKOT-S;PERICOLACE) 8.6-50 MG per tablet Take 1-2 tablets by mouth 2 times daily as needed (constipation )   bacitracin-polymyxin b (POLYSPORIN) ointment Apply topically 2 times daily   order for DME Equipment being ordered: DH2 shoe   tamsulosin (FLOMAX) 0.4 MG 24 hr capsule Take 0.8 mg by mouth At Bedtime    ZOLPIDEM TARTRATE PO Take 5 mg by mouth nightly as needed    Cholecalciferol (VITAMIN D PO) Take 1,000 Units by mouth daily.    atorvastatin (LIPITOR) 40 MG tablet Take 1 tablet by mouth daily.   Ascorbic Acid (VITAMIN C PO) Take 500 mg by mouth daily.   Nitroglycerin (NITROQUICK SL) Place 0.4 mg under the tongue as needed.       SH:   Social History   Substance Use Topics     Smoking status: Former Smoker     Packs/day: 2.00     Years: 30.00     Types: Cigarettes     Quit date: 6/19/1995     Smokeless tobacco: Never Used     Alcohol use Yes      Comment: 1 drink monthly       Allergies:   Allergies   Allergen Reactions     Blood Transfusion Related (Informational Only) Other (See Comments)     Patient has a history of a clinically significant antibody against RBC antigens.  A delay in compatible RBCs may occur.        NPO Status: Per ASA Guidelines    Labs:    Blood Bank:  Lab Results   Component Value Date    ABO A 02/11/2017    RH  Pos 02/11/2017    AS Neg 02/11/2017     BMP:  Recent Labs   Lab Test  02/11/17   0734   NA  134   POTASSIUM  3.8   CHLORIDE  102   CO2  23   BUN  12   CR  0.68   GLC  178*   JUSTINO  8.4*     CBC:   Recent Labs   Lab Test  02/11/17   0734   WBC  6.8   RBC  3.27*   HGB  9.2*   HCT  28.6*   MCV  88   MCH   28.1   MCHC  32.2   RDW  16.0*   PLT  166     Coags:  Recent Labs   Lab Test  02/09/17   0605   INR  1.25*   PTT  31   FIBR  538*                        Anesthesia Plan      History & Physical Review  History and physical reviewed and following examination; no interval change.    ASA Status:  3 .    NPO Status:  > 8 hours    Plan for General and ETT with Inhalation induction. Maintenance will be Inhalation.    PONV prophylaxis:  Ondansetron (or other 5HT-3)  Additional equipment: 2nd IV and Arterial Line To be discussed with staff.   - ASA 3  - GETA with standard ASA monitors, IV induction, balanced anesthetic  - PIVx2, arterial line  - Antibiotics per surgery  - PONV prophylaxis        Postoperative Care  Postoperative pain management:  IV analgesics and Multi-modal analgesia.  Plan for postoperative opioid use.    Consents      Sharon Mcintyre MD  Anesthesiology resident   [facility]

## 2017-02-12 NOTE — PROGRESS NOTES
[facility] SERVICE: Chart Note     Patient:  Tad Manning, Date of birth 1940, Medical record number [id number]  Date of Note:  February 12, 2017    Mr. Manning has been in surgery today, but his S. mitis has now been confirmed as sensitive to ceftriaxone so his antibiotics could be tailored to this organism.     Recommendations:   1. Stop meropenem and vancomycin  2. Start ceftriaxone 2g IV Q12H    Orders placed. Will notify neurosurgery team.     The [employer] general ID service will continue following this patient. Please feel free to call with questions.     Gillian Max MD  Infectious Diseases  [phone]

## 2017-02-12 NOTE — PLAN OF CARE
Problem: Laminectomy/Foraminotomy/Discectomy (Adult)  Goal: Signs and Symptoms of Listed Potential Problems Will be Absent or Manageable (Laminectomy/Foraminotomy/Discectomy)  Signs and symptoms of listed potential problems will be absent or manageable by discharge/transition of care (reference Laminectomy/Foraminotomy/Discectomy (Adult) CPG).   Outcome: No Change  VS with HTN. A&Ox4. Neuros unchanged with neuropathy in hands and feet, BLE 1/5 R<L. Oxycodone, flexeril, tylenol and IV dilaudid given for pain. Back incision intact, open to air dressing. Hemovac removed this shift. Sacrum with mepilex in place, visible area around mepilex reddened and blanchable. Up with A2/lift,pt on bedrest with commode privileges, TLSO on when OOB, turn/repo q2h and prn. PICC infusing at 10mL/hr. Right arm area below PICC and above old PIV site is pink and hard, cont to monitor. CHO diet. Dugan placed this shift after pt unable to void per MD Thurman's order. Pre op scub x2 completed. Plan for OR this am, report given to PACU. Cont to monitor and with POC.

## 2017-02-13 ENCOUNTER — APPOINTMENT (OUTPATIENT)
Dept: GENERAL RADIOLOGY | Facility: CLINIC | Age: 77
DRG: 456 | End: 2017-02-13
Attending: NEUROLOGICAL SURGERY
Payer: MEDICARE

## 2017-02-13 ENCOUNTER — APPOINTMENT (OUTPATIENT)
Dept: PHYSICAL THERAPY | Facility: CLINIC | Age: 77
DRG: 456 | End: 2017-02-13
Attending: NEUROLOGICAL SURGERY
Payer: MEDICARE

## 2017-02-13 ENCOUNTER — APPOINTMENT (OUTPATIENT)
Dept: OCCUPATIONAL THERAPY | Facility: CLINIC | Age: 77
DRG: 456 | End: 2017-02-13
Attending: STUDENT IN AN ORGANIZED HEALTH CARE EDUCATION/TRAINING PROGRAM
Payer: MEDICARE

## 2017-02-13 LAB
ANION GAP SERPL CALCULATED.3IONS-SCNC: 9 MMOL/L (ref 3–14)
BACTERIA SPEC CULT: ABNORMAL
BACTERIA SPEC CULT: ABNORMAL
BASOPHILS # BLD AUTO: 0 10E9/L (ref 0–0.2)
BASOPHILS NFR BLD AUTO: 0.1 %
BUN SERPL-MCNC: 11 MG/DL (ref 7–30)
CALCIUM SERPL-MCNC: 8.3 MG/DL (ref 8.5–10.1)
CHLORIDE SERPL-SCNC: 99 MMOL/L (ref 94–109)
CO2 SERPL-SCNC: 24 MMOL/L (ref 20–32)
CREAT SERPL-MCNC: 0.62 MG/DL (ref 0.66–1.25)
DIFFERENTIAL METHOD BLD: ABNORMAL
EOSINOPHIL # BLD AUTO: 0.1 10E9/L (ref 0–0.7)
EOSINOPHIL NFR BLD AUTO: 0.9 %
ERYTHROCYTE [DISTWIDTH] IN BLOOD BY AUTOMATED COUNT: 16 % (ref 10–15)
GFR SERPL CREATININE-BSD FRML MDRD: ABNORMAL ML/MIN/1.7M2
GLUCOSE SERPL-MCNC: 205 MG/DL (ref 70–99)
HCT VFR BLD AUTO: 26 % (ref 40–53)
HGB BLD-MCNC: 8.2 G/DL (ref 13.3–17.7)
IMM GRANULOCYTES # BLD: 0 10E9/L (ref 0–0.4)
IMM GRANULOCYTES NFR BLD: 0.3 %
LYMPHOCYTES # BLD AUTO: 0.7 10E9/L (ref 0.8–5.3)
LYMPHOCYTES NFR BLD AUTO: 7 %
Lab: ABNORMAL
Lab: ABNORMAL
MCH RBC QN AUTO: 27.6 PG (ref 26.5–33)
MCHC RBC AUTO-ENTMCNC: 31.5 G/DL (ref 31.5–36.5)
MCV RBC AUTO: 88 FL (ref 78–100)
MICRO REPORT STATUS: ABNORMAL
MICRO REPORT STATUS: ABNORMAL
MICROORGANISM SPEC CULT: ABNORMAL
MONOCYTES # BLD AUTO: 0.9 10E9/L (ref 0–1.3)
MONOCYTES NFR BLD AUTO: 8.7 %
NEUTROPHILS # BLD AUTO: 8.2 10E9/L (ref 1.6–8.3)
NEUTROPHILS NFR BLD AUTO: 83 %
NRBC # BLD AUTO: 0 10*3/UL
NRBC BLD AUTO-RTO: 0 /100
PLATELET # BLD AUTO: 198 10E9/L (ref 150–450)
POTASSIUM SERPL-SCNC: 3.9 MMOL/L (ref 3.4–5.3)
RBC # BLD AUTO: 2.97 10E12/L (ref 4.4–5.9)
SODIUM SERPL-SCNC: 132 MMOL/L (ref 133–144)
SPECIMEN SOURCE: ABNORMAL
SPECIMEN SOURCE: ABNORMAL
WBC # BLD AUTO: 9.9 10E9/L (ref 4–11)

## 2017-02-13 PROCEDURE — 97535 SELF CARE MNGMENT TRAINING: CPT | Mod: GO | Performed by: OCCUPATIONAL THERAPIST

## 2017-02-13 PROCEDURE — 40000940 XR THORACIC SPINE 2 VW: Mod: TC

## 2017-02-13 PROCEDURE — 25000132 ZZH RX MED GY IP 250 OP 250 PS 637: Mod: GY | Performed by: STUDENT IN AN ORGANIZED HEALTH CARE EDUCATION/TRAINING PROGRAM

## 2017-02-13 PROCEDURE — A9270 NON-COVERED ITEM OR SERVICE: HCPCS | Mod: GY | Performed by: NEUROLOGICAL SURGERY

## 2017-02-13 PROCEDURE — A9270 NON-COVERED ITEM OR SERVICE: HCPCS | Mod: GY | Performed by: NURSE PRACTITIONER

## 2017-02-13 PROCEDURE — 99233 SBSQ HOSP IP/OBS HIGH 50: CPT | Performed by: HOSPITALIST

## 2017-02-13 PROCEDURE — 97530 THERAPEUTIC ACTIVITIES: CPT | Mod: GP

## 2017-02-13 PROCEDURE — 25000131 ZZH RX MED GY IP 250 OP 636 PS 637: Mod: GY | Performed by: STUDENT IN AN ORGANIZED HEALTH CARE EDUCATION/TRAINING PROGRAM

## 2017-02-13 PROCEDURE — 25000132 ZZH RX MED GY IP 250 OP 250 PS 637: Mod: GY | Performed by: NEUROLOGICAL SURGERY

## 2017-02-13 PROCEDURE — 25000132 ZZH RX MED GY IP 250 OP 250 PS 637: Mod: GY | Performed by: HOSPITALIST

## 2017-02-13 PROCEDURE — 97162 PT EVAL MOD COMPLEX 30 MIN: CPT | Mod: GP

## 2017-02-13 PROCEDURE — 80048 BASIC METABOLIC PNL TOTAL CA: CPT | Performed by: STUDENT IN AN ORGANIZED HEALTH CARE EDUCATION/TRAINING PROGRAM

## 2017-02-13 PROCEDURE — 85025 COMPLETE CBC W/AUTO DIFF WBC: CPT | Performed by: STUDENT IN AN ORGANIZED HEALTH CARE EDUCATION/TRAINING PROGRAM

## 2017-02-13 PROCEDURE — 12000008 ZZH R&B INTERMEDIATE UMMC

## 2017-02-13 PROCEDURE — 25000132 ZZH RX MED GY IP 250 OP 250 PS 637: Mod: GY | Performed by: NURSE PRACTITIONER

## 2017-02-13 PROCEDURE — 25000128 H RX IP 250 OP 636: Performed by: STUDENT IN AN ORGANIZED HEALTH CARE EDUCATION/TRAINING PROGRAM

## 2017-02-13 PROCEDURE — A9270 NON-COVERED ITEM OR SERVICE: HCPCS | Mod: GY | Performed by: HOSPITALIST

## 2017-02-13 PROCEDURE — A9270 NON-COVERED ITEM OR SERVICE: HCPCS | Mod: GY | Performed by: STUDENT IN AN ORGANIZED HEALTH CARE EDUCATION/TRAINING PROGRAM

## 2017-02-13 PROCEDURE — 99211 OFF/OP EST MAY X REQ PHY/QHP: CPT

## 2017-02-13 PROCEDURE — 40000193 ZZH STATISTIC PT WARD VISIT

## 2017-02-13 PROCEDURE — 97166 OT EVAL MOD COMPLEX 45 MIN: CPT | Mod: GO | Performed by: OCCUPATIONAL THERAPIST

## 2017-02-13 PROCEDURE — 36415 COLL VENOUS BLD VENIPUNCTURE: CPT | Performed by: STUDENT IN AN ORGANIZED HEALTH CARE EDUCATION/TRAINING PROGRAM

## 2017-02-13 PROCEDURE — 25000125 ZZHC RX 250: Performed by: NURSE PRACTITIONER

## 2017-02-13 PROCEDURE — 40000133 ZZH STATISTIC OT WARD VISIT: Performed by: OCCUPATIONAL THERAPIST

## 2017-02-13 PROCEDURE — 00000146 ZZHCL STATISTIC GLUCOSE BY METER IP

## 2017-02-13 PROCEDURE — 25000128 H RX IP 250 OP 636: Performed by: INTERNAL MEDICINE

## 2017-02-13 RX ORDER — PANTOPRAZOLE SODIUM 40 MG/1
40 TABLET, DELAYED RELEASE ORAL EVERY MORNING
Status: DISCONTINUED | OUTPATIENT
Start: 2017-02-13 | End: 2017-02-15 | Stop reason: HOSPADM

## 2017-02-13 RX ADMIN — INSULIN ASPART 4 UNITS: 100 INJECTION, SOLUTION INTRAVENOUS; SUBCUTANEOUS at 09:21

## 2017-02-13 RX ADMIN — CYCLOBENZAPRINE HYDROCHLORIDE 5 MG: 5 TABLET, FILM COATED ORAL at 22:56

## 2017-02-13 RX ADMIN — OXYCODONE HYDROCHLORIDE 10 MG: 5 TABLET ORAL at 11:56

## 2017-02-13 RX ADMIN — INSULIN GLARGINE 5 UNITS: 100 INJECTION, SOLUTION SUBCUTANEOUS at 05:41

## 2017-02-13 RX ADMIN — HYDROMORPHONE HYDROCHLORIDE 0.5 MG: 10 INJECTION, SOLUTION INTRAMUSCULAR; INTRAVENOUS; SUBCUTANEOUS at 11:36

## 2017-02-13 RX ADMIN — BISACODYL 10 MG: 10 SUPPOSITORY RECTAL at 07:02

## 2017-02-13 RX ADMIN — ACETAMINOPHEN 650 MG: 325 TABLET, FILM COATED ORAL at 20:47

## 2017-02-13 RX ADMIN — ACETAMINOPHEN 650 MG: 325 TABLET, FILM COATED ORAL at 00:45

## 2017-02-13 RX ADMIN — CEFTRIAXONE 2 G: 2 INJECTION, POWDER, FOR SOLUTION INTRAMUSCULAR; INTRAVENOUS at 14:09

## 2017-02-13 RX ADMIN — INSULIN ASPART 2 UNITS: 100 INJECTION, SOLUTION INTRAVENOUS; SUBCUTANEOUS at 19:13

## 2017-02-13 RX ADMIN — SENNOSIDES AND DOCUSATE SODIUM 2 TABLET: 8.6; 5 TABLET ORAL at 20:47

## 2017-02-13 RX ADMIN — PANTOPRAZOLE SODIUM 40 MG: 40 TABLET, DELAYED RELEASE ORAL at 08:26

## 2017-02-13 RX ADMIN — HYDROMORPHONE HYDROCHLORIDE 0.5 MG: 10 INJECTION, SOLUTION INTRAMUSCULAR; INTRAVENOUS; SUBCUTANEOUS at 10:03

## 2017-02-13 RX ADMIN — POLYETHYLENE GLYCOL 3350 17 G: 17 POWDER, FOR SOLUTION ORAL at 07:01

## 2017-02-13 RX ADMIN — OXYCODONE HYDROCHLORIDE 10 MG: 5 TABLET ORAL at 00:45

## 2017-02-13 RX ADMIN — OXYCODONE HYDROCHLORIDE 10 MG: 5 TABLET ORAL at 07:01

## 2017-02-13 RX ADMIN — ACETAMINOPHEN 650 MG: 325 TABLET, FILM COATED ORAL at 16:40

## 2017-02-13 RX ADMIN — OXYCODONE HYDROCHLORIDE 10 MG: 5 TABLET ORAL at 16:40

## 2017-02-13 RX ADMIN — SENNOSIDES AND DOCUSATE SODIUM 2 TABLET: 8.6; 5 TABLET ORAL at 07:02

## 2017-02-13 RX ADMIN — TAMSULOSIN HYDROCHLORIDE 0.8 MG: 0.4 CAPSULE ORAL at 22:56

## 2017-02-13 RX ADMIN — ACETAMINOPHEN 650 MG: 325 TABLET, FILM COATED ORAL at 11:56

## 2017-02-13 RX ADMIN — INSULIN ASPART 4 UNITS: 100 INJECTION, SOLUTION INTRAVENOUS; SUBCUTANEOUS at 12:20

## 2017-02-13 RX ADMIN — OXYCODONE HYDROCHLORIDE 10 MG: 5 TABLET ORAL at 20:46

## 2017-02-13 RX ADMIN — ATORVASTATIN CALCIUM 40 MG: 40 TABLET, FILM COATED ORAL at 07:02

## 2017-02-13 RX ADMIN — CEFTRIAXONE 2 G: 2 INJECTION, POWDER, FOR SOLUTION INTRAMUSCULAR; INTRAVENOUS at 02:05

## 2017-02-13 RX ADMIN — ATENOLOL 25 MG: 25 TABLET ORAL at 07:02

## 2017-02-13 RX ADMIN — CYCLOBENZAPRINE HYDROCHLORIDE 5 MG: 5 TABLET, FILM COATED ORAL at 14:08

## 2017-02-13 RX ADMIN — SODIUM CHLORIDE, PRESERVATIVE FREE 5 ML: 5 INJECTION INTRAVENOUS at 19:13

## 2017-02-13 ASSESSMENT — VISUAL ACUITY
OU: NORMAL ACUITY
OU: NORMAL ACUITY

## 2017-02-13 NOTE — PROGRESS NOTES
Hospital Medicine  Follow-up Consult Note  Date of Service: February 13, 2017    Patient: Tad Manning  MRN: [id number]  Admission Date: 2/9/2017  Hospital Day # 4      Assessment & Plan: Tad Manning is a 76 year old man with a history of CAD s/p 3v CABG (1995), PAD s/p bilateral LE stents,  IDDM2,  CLL in remission, admitted to the Neurosurgical service from OSH with T9-10 discitis and epidural absecess s/p T9-10 laminectomy for spinal cord decompression. Admission blood cultures growing strep mitis.  Now s/p T7-12 posterior fusion with TLIF T9-10 on 2/12/17.    # T9-10 discitis w/ epidural abscess s/p laminectomy s/p T7-T12 posterior fusion w/ TLIF T9-10  # Streptococcus mitis bacteremia    - agree with ceftriaxone 2g IV q12h  - pain control per primary team    # CAD s/p 3v CABG (1995)  # HFrEF (EF 40-45%)  Remains asymptomatic post-operatively. Appears euvolemic. Heart failure currently compensated.   - continue atenolol  - restart aspirin as soon as is deemed safe from post-op bleeding perspective (for BLE stents as well as CAD)  - daily weight  - diabetic/low sodium diet     # PAD s/p bilateral iliac artery stents (2013) and LLE bypass (2014)  The patient's home ASA and plavix have been held since admission in anticipation of surgery. Recommend restarting aspirin as soon as it is safe from post-op bleeding standpoint.  Now that we are 3+ years out from the patient's stenting procedure, dual antiplatelet therapy becomes less essential.   - recommend restarting aspirin as soon as is deemed safe by primary team  - can hold plavix for now  - timing of DVT ppx per primary team  - would plan to discharge on both aspirin and plavix    # DM2, insulin dependent  Had just been started on lantus (5 units) prior to admission. A1c 6.1%. Continues to have elevated blood sugars, which are likely due to high glucose loads from regular soda and ensure.   - increase lantus to 10 units qAM  - continue high  correctional scale  - hold home oral hypoglycemics  - switch from Ensure to Glucerna   - diet soda only, discussed with RN    # Constipation:  - miralax, senna/docusate  - bisacodyl suppository daily  - enema today    CODE: Full   Diet/IVF: Diabetic, low sodium diet, Glucerna, diet soda only  DVT ppx: SCDs. Pharm ppx timing deferred to primary team    Thank you for involving us in the care of Mr. Manning. We will continue to follow along with you.     Pt's care was discussed with bedside RN.      Norberto Rosas MD  [facility]   Pager: [phone]    Team: [facility] Consult  Page Cross Cover after 5 pm: pager [phone]   ___________________________________________________________________    Subjective & Interval Hx:    Nursing notes reviewed.  Back pain unchanged.   No chest pain, shortness of breath, or abd pain.   Still has not had BM.   Up to commode, but felt very unsteady on his feet.   Has been drinking regular (non-diet) Morton.   Medications: Reviewed in EPIC.     Physical Exam:    Blood pressure 114/58, pulse 71, temperature 97.6  F (36.4  C), temperature source Oral, resp. rate 16, SpO2 96 %.    General: NAD. Pleasant.  HEENT: No icterus or injection.   Chest/Resp: Lungs CTAB anteriorly. Nonlabored breathing.    Heart/CV: RRR. Normal S1/S2. No extra heart sounds. Non-displaced PMI. No JVD. No LE edema.  Abdomen/GI: Soft, nontender, nondistended.    Extremities/MSK: Warm, well perfused.    Skin: No rashes.    Neuro: Alert. Oriented x3. Normal speech.   Psych: Appropriate mood.       Labs & Studies of Note: I personally all lab and imaging studies in the last 24h.     All cultures:    Recent Labs  Lab 02/09/17  2329 02/09/17  2206 02/09/17  0334 02/09/17  0153 02/08/17  1945   CULT No growth after 3 days No growth after 4 days Moderate growth Streptococcus mitis groupThese bacteria are part of normal skin himanshu, but on occasion, may be true pathogens.  Clinical  correlation must be applied to interpreting this microbiology result.*  Culture negative monitoring continues On day 2, isolated in broth only: Streptococcus mitis group Susceptibility testing done on previous specimenCulture in progress*  Culture negative monitoring continues Cultured on the 1st day of incubation: Streptococcus oralisCritical Value/Significant Value, preliminary result only, called to and read back by Florin Villegas RN 4A @ 1608 2/9/17 CSSusceptibility testing done on previous specimen*  Cultured on the 1st day of incubation: Streptococcus oralisCritical Value/Significant Value, preliminary result only, called to and read back by Florin Villegas RN 4A @ 1602 2/9/17 CS(Note)POSITIVE for STREPTOCOCCUS SPECIES OTHER THAN pneumococcus, anginosusgroup, S. pyogenes and S. agalactiae. Performed using Fashion GPSmultiplex nucleic acid test. Final identification and antimicrobialsusceptibility testing will be verified by standard methods.Specimen tested with Stemgentigene multiplex, gram-positive blood culturenucleic acid test for the following targets: Staph aureus, Staphepidermidis, Staph lugdunensis, other Staph species, Enterococcusfaecalis, Enterococcus faecium, Streptococcus species, S. agalactiae,S. anginosus grp., S. pneumoniae, S. pyogenes, Listeria sp., mecA(methicillin resistance) and Mckenna/B (vancomycin resistance).Critical Value/Significant Value called to and read back by Darryl RN @6584 2/9/17. ct*

## 2017-02-13 NOTE — PROGRESS NOTES
[facility], [place]   Neurosurgery Daily Note          Assessment and Plan:   Tad Manning is a 76 year old male with CAD, DM with neuropathy, HTN, PVD s/p bilateral LE stents on ASA/plvx, Chronic lymphocytic leukemia in remission who presents after a fall and is found to have infectious spondylitis at T9-10 with paraspinal abscesses, retrolisthesis and epidural inflammation resulting in severe canal stenosis and cord impingement. On 2/9 he underwent T9-10 laminectomy for spinal cord decompression. He is improving post operatively. Blood cultures are growing GPCs in pairs and chains.    - TO OR today.  T7-12 posterior fusion with TLIF T9-10.   - type/screen, TXA available for oR  - medicine pre-op clearance obtained   - HV drain removed.  - TLSO brace  - Continue holding ASA and plavix   - follow up blood cultures; Appreciate ID recommendations.    - q4 hour neuro checks  - Bowel regimen  - PRN pain meds/antiemetics  - PT/OT    - SCDs                  Interval History:   Pain improved.              Review of Systems:   The Review of Systems is otherwise negative beyond that which has been previously stated.          Medications:   I have reviewed this patient's current medications.    Current Facility-Administered Medications   Medication Dose Route Frequency     cefTRIAXone  2 g Intravenous Q12H     polyethylene glycol  17 g Oral Daily     atenolol  25 mg Oral Daily     heparin lock flush  5-10 mL Intracatheter Q24H     bisacodyl  10 mg Rectal Daily     atorvastatin  40 mg Oral Daily     tamsulosin  0.8 mg Oral At Bedtime     sodium chloride (PF)  3 mL Intracatheter Q8H     insulin aspart  1-10 Units Subcutaneous TID AC     insulin aspart  1-7 Units Subcutaneous At Bedtime     pantoprazole (PROTONIX) IV PEDS/NICU  40 mg Intravenous Daily     senna-docusate  1-2 tablet Oral BID        Physical Exam:  General:   Comfortable respiratory effort; normal cardiac rhythm.     Mental Status:     awake, alert and oriented x3.    fluent, communicative, intact comprehension.    Cranial Nerves:  equal and reactive pupils    extraocular movements preserved    no dysarthria    Motor/Sensory:  1/5 throughout the right lower extremity; left lower extremity - 4/5 plantar flexion, 3/5 dorsi flexion and EHL, otherwise 2/5 at the knee and hip.   No deficits to pain, soft touch.    Intact perianal sensation      Reflexes: hypoactive in the lower extremities         Data:   The labs and imaging for this patient have been reviewed directly.    I have reviewed the history.I have seen and examined the patient myself and agree with the assessment and plan above. Back to OR per Dr. Trent for second stage of operation.  DWIGHT Messina MD

## 2017-02-13 NOTE — PROGRESS NOTES
" 02/13/17 1430   Quick Adds   Type of Visit Initial Occupational Therapy Evaluation   Living Environment   Lives With other (see comments)  (female roommate)   Living Arrangements apartment   Home Accessibility stairs to enter home   Number of Stairs to Enter Home 1  (small threshold to get into building)   Number of Stairs Within Home 0  (has elevator)   Transportation Available car;family or friend will provide   Living Environment Comment Pt reports he has brother who lives nearby and can provide some assist in addition to roommate   Self-Care   Dominant Hand right   Usual Activity Tolerance moderate   Current Activity Tolerance poor   Regular Exercise no   Equipment Currently Used at Home none  (owns FWW, borrowing RTS, roommate obtaining shower chair)   Activity/Exercise/Self-Care Comment Pt was able to complete functional mobility with no AD up until 3 days PTA when he no longer could mobilize at all (resulting in fall and admission to hospital)   Functional Level Prior   Ambulation 0-->independent   Transferring 0-->independent   Toileting 0-->independent   Bathing 0-->independent   Dressing 0-->independent   Eating 0-->independent   Communication 0-->understands/communicates without difficulty   Swallowing 0-->swallows foods/liquids without difficulty   Cognition 1 - attention or memory deficits  (some \"tracking\" deficits noted by pt's brother)   Fall history within last six months yes   Number of times patient has fallen within last six months 4   Which of the above functional risks had a recent onset or change? ambulation;transferring;toileting;bathing;dressing;cognition;fall history   Prior Functional Level Comment Pt was (I) with all ADL/IADLs including driving and work as a \"independent representative\" selling photography equipment; pt has been unable to drive for the past 3 months due to medical issues; pt is still working (can complete on computer from home)   General Information   Onset of " "Illness/Injury or Date of Surgery - Date 02/09/17   Referring Physician Zeeshan Valladares MD   Patient/Family Goals Statement to be able to stand and walk   Additional Occupational Profile Info/Pertinent History of Current Problem Pt is a 76 year old male with CAD, DM with neuropathy, HTN, PVD s/p bilateral LE stents on ASA/plvx, Chronic lymphocytic leukemia in remission who presents after a fall and is found to have infectious spondylitis at T9-10 with paraspinal abscesses, retrolisthesis and epidural inflammation resulting in severe canal stenosis and cord impingement. On 2/9 he underwent T9-10 laminectomy for spinal cord decompression   Precautions/Limitations spinal precautions;fall precautions   Weight-Bearing Status - LUE (10# lifting/pushing/pulling restriction)   Weight-Bearing Status - RUE (10# lifting/pushing/pulling restriction)   Weight-Bearing Status - LLE full weight-bearing   Weight-Bearing Status - RLE full weight-bearing   Cognitive Status Examination   Orientation orientation to person, place and time  (near date; reported 15th)   Level of Consciousness alert   Able to Follow Commands success, 1-step commands   Personal Safety (Cognitive) decreased insight to deficits   Memory impaired   Attention Distractible during evaluation;Sustained attention impaired   Executive Function Working memory impaired, decreased storage of information for performing tasks   Cognitive Comment Pt quite tangential in conversation; pt's brother reported he has trouble \"tracking\" at times (assume he meant conversations? unable to obtain more details at this time); pt reports he sometimes get confused (gives example of reading clock incorrectly at times); will monitor and futher assess as needed   Visual Perception   Visual Perception Wears glasses  (all the time; no new concerns)   Sensory Examination   Sensory Quick Adds Other (describe)   Sensory Comments Pt reports decreased sensation in BLEs from hips through " feet; pt is able to feel light touch   Pain Assessment   Patient Currently in Pain Yes, see Vital Sign flowsheet   Range of Motion (ROM)   ROM Comment BUEs WFL   Strength   Strength Comments N/T due to precautions   Mobility   Bed Mobility Bed mobility skill: Rolling/Turning;Bed mobility skill: Scooting/Bridging;Bed mobility skill: Sit to supine;Bed mobility skill: Supine to sit   Bed Mobility Skill: Rolling/Turning   Level of [place] - Bed Mobility Skill Rolling Turning moderate assist (50% patients effort)   Physical Assist/Nonphysical Assist verbal cues;1 person assist   Bed Mobility Skill: Scooting/Bridging   Level of [place]: Scooting/Bridging moderate assist (50% patients effort)   Physical Assist/Nonphysical Assist: Scooting/Bridging verbal cues;1 person assist   Bed Mobility Skill: Sit to Supine   Level of [place]: Sit/Supine moderate assist (50% patients effort)   Physical Assist/Nonphysical Assist: Sit/Supine verbal cues;2 persons   Bed Mobility Skill: Supine to Sit   Level of [place]: Supine/Sit moderate assist (50% patients effort)   Physical Assist/Nonphysical Assist: Supine/Sit verbal cues;1 person assist   Transfer Skills   Transfer Comments Not attempted as PT reported as unsafe during their eval earlier today; currently lift dependent for transfers   Activities of Daily Living Analysis   Impairments Contributing to Impaired Activities of Daily Living balance impaired;cognition impaired;fear and anxiety;pain;post surgical precautions;sensation decreased;strength decreased   General Therapy Interventions   Planned Therapy Interventions ADL retraining;IADL retraining;bed mobility training;cognition;orthotic fitting/training;ROM;strengthening;stretching;transfer training;home program guidelines;progressive activity/exercise   Clinical Impression   Criteria for Skilled Therapeutic Interventions Met yes, treatment indicated   OT Diagnosis decreased ADL/IADL (I)   Influenced by the  "following impairments pain, surgical precautions, weakness   Assessment of Occupational Performance 5 or more Performance Deficits   Identified Performance Deficits transfers, toileting, bathing, dressing, grooming, cooking, cleaning, driving, managing meds and finances, working   Clinical Decision Making (Complexity) Moderate complexity   Therapy Frequency daily   Predicted Duration of Therapy Intervention (days/wks) 7-10 days   Anticipated Equipment Needs at Discharge (TBD pending discharge location)   Anticipated Discharge Disposition Transitional Care Facility;Acute Rehabilitation Facility   Risks and Benefits of Treatment have been explained. Yes   Patient, Family & other staff in agreement with plan of care Yes   [facility] TM \"6 Clicks\"   2016, Trustees of [facility], under license to [employer].  All rights reserved.   6 Clicks Short Forms Daily Activity Inpatient Short Form   [facility]  \"6 Clicks\" Daily Activity Inpatient Short Form   1. Putting on and taking off regular lower body clothing? 1 - Total   2. Bathing (including washing, rinsing, drying)? 2 - A Lot   3. Toileting, which includes using toilet, bedpan or urinal? 1 - Total   4. Putting on and taking off regular upper body clothing? 2 - A Lot   5. Taking care of personal grooming such as brushing teeth? 3 - A Little   6. Eating meals? 3 - A Little   Daily Activity Raw Score (Score out of 24.Lower scores equate to lower levels of function) 12   Total Evaluation Time   Total Evaluation Time (Minutes) 14     "

## 2017-02-13 NOTE — PLAN OF CARE
Problem: Goal Outcome Summary  Goal: Goal Outcome Summary  PT 6A: Initial Evaluation - Pt reports being previously IND at baseline without use of AD for all mobility tasks. Currently requires mod A for supine>sit and dependent via [place] for transfers. x1 failed attempt for sit<>stand with max Ax2 and 2WW. Poor sitting balance without back support, requiring min-mod A. Educated on spinal precautions. Limited 2/2 to gross functional weakness and balance deficits. VSS throughout.     REC: ARU once medically stable d/t IND PLOF and gross functional deficits far below baseline currently.

## 2017-02-13 NOTE — PROGRESS NOTES
[facility], [place]   Neurosurgery Daily Note          Assessment and Plan:   Tad Manning is a 76 year old male with CAD, DM with neuropathy, HTN, PVD s/p bilateral LE stents on ASA/plvx, Chronic lymphocytic leukemia in remission who presents after a fall and is found to have infectious spondylitis at T9-10 with paraspinal abscesses, retrolisthesis and epidural inflammation resulting in severe canal stenosis and cord impingement. On 2/9 he underwent T9-10 laminectomy for spinal cord decompression. He is improving post operatively. Blood cultures are growing GPCs in pairs and chains.    On 2/13 he underwent T7-12 posterior fusion with TLIF T9-10.   - monitor HV drain output   - discuss need for TLSO brace  - Continue holding ASA and plavix   - cultures growing strep Mitis - continue ceftriaxone and follow up susceptibilities; PICC in place   - PT/OT    - SCDs                  Interval History:   Slight improvement in motor function.              Review of Systems:   The Review of Systems is otherwise negative beyond that which has been previously stated.          Medications:   I have reviewed this patient's current medications.    Current Facility-Administered Medications   Medication Dose Route Frequency     insulin glargine  5 Units Subcutaneous At Bedtime     cefTRIAXone  2 g Intravenous Q12H     polyethylene glycol  17 g Oral Daily     atenolol  25 mg Oral Daily     heparin lock flush  5-10 mL Intracatheter Q24H     bisacodyl  10 mg Rectal Daily     atorvastatin  40 mg Oral Daily     tamsulosin  0.8 mg Oral At Bedtime     sodium chloride (PF)  3 mL Intracatheter Q8H     insulin aspart  1-10 Units Subcutaneous TID AC     insulin aspart  1-7 Units Subcutaneous At Bedtime     pantoprazole (PROTONIX) IV PEDS/NICU  40 mg Intravenous Daily     senna-docusate  1-2 tablet Oral BID        Physical Exam:  General:   Comfortable respiratory effort; normal cardiac rhythm.     Mental Status:     awake, alert and oriented x3.    fluent, communicative, intact comprehension.    Cranial Nerves:  equal and reactive pupils    extraocular movements preserved    no dysarthria    Motor/Sensory:  1/5 throughout the right lower extremity; left lower extremity - 4/5 plantar flexion, 3/5 dorsi flexion and EHL, otherwise 2/5 at the knee and hip.   No deficits to pain, soft touch.    Intact perianal sensation      Reflexes: hypoactive in the lower extremities         Data:   The labs and imaging for this patient have been reviewed directly.

## 2017-02-13 NOTE — PLAN OF CARE
Problem: Goal Outcome Summary  Goal: Goal Outcome Summary     6A: OT eval completed and tx initiated. Pt unable to recall PT education on surgical precautions; writer provided additional education on spinal precautions, impact on ADLs/activity, and compensatory strategies for tasks. Pt also issued handout for reinforcement of learning. Pt able to transfer up to EOB with mod A and VCs; pt able to tolerate sitting EOB ~10 min with CGA-min A to complete ADLs.     Recommend discharge to ARU vs TCU pending pt's tolerance for therapies. Pt would be a good ARU candidate as he was very (I) PTA, has strong motivation, and family support with new fx diagnosis of paraplegia.

## 2017-02-13 NOTE — PLAN OF CARE
Problem: Laminectomy/Foraminotomy/Discectomy (Adult)  Goal: Signs and Symptoms of Listed Potential Problems Will be Absent or Manageable (Laminectomy/Foraminotomy/Discectomy)  Signs and symptoms of listed potential problems will be absent or manageable by discharge/transition of care (reference Laminectomy/Foraminotomy/Discectomy (Adult) CPG).   Outcome: No Change  VSS. A&Ox4. Neuros unchanged with neuropathy in hands and feet, BLE 2/5 R<L. C/o back pain Oxycodone, and tylenol given with moderate relief. Back incision, dressing marked. Hemovac in place. Sacrum with blanchable redness and sloughing skin, new mepilex applied. Up with A2/lift, TLSO on when OOB, turn/repo q2h and prn. PICC infusing at 10mL/hr. Right arm area below PICC and above old PIV site is pink and hard, cont to monitor. CHO diet. Plan to remove schuler today per order. No BM this shift, passing flatus; attempted to use bedpan x1 with no results. Cont to monitor and with POC.     Update: Pt went down for xray this am. Schuler removed at 0930, pt due to void. Waiting for activity and TLSO brace order to be clarified, per MD [place] TLSO brace is just for pt comfort at this time. Plan to get pt weight and have pt attempt to have BM on commode, if no results prn tap water enema to be given.

## 2017-02-13 NOTE — PROGRESS NOTES
" 02/13/17 1000   Quick Adds   Type of Visit Initial PT Evaluation   Living Environment   Lives With other (see comments)  (lives with female roomate)   Living Arrangements apartment   Home Accessibility no concerns   Number of Stairs to Enter Home 1  (small 2\" treshold step)   Number of Stairs Within Home 0   Stair Railings at Home none   Transportation Available car   Living Environment Comment Lives in 3rd floor apartment, has elevator access. Does not currently drive d/t back pain. 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 States he does not have to stand for lengthy periods of time.   Self-Care   Dominant Hand right   Usual Activity Tolerance poor   Current Activity Tolerance fair   Regular Exercise no   Equipment Currently Used at Home none;walker, rolling   Activity/Exercise/Self-Care Comment Has 2WW at home, however does not currently use.   Functional Level Prior   Ambulation 0-->independent   Transferring 0-->independent   Toileting 0-->independent   Bathing 0-->independent   Dressing 0-->independent   Eating 0-->independent   Communication 0-->understands/communicates without difficulty   Swallowing 0-->swallows foods/liquids without difficulty   Cognition 0 - no cognition issues reported   Fall history within last six months no   Which of the above functional risks had a recent onset or change? ambulation;transferring;toileting;bathing;dressing   Prior Functional Level Comment Reports IND without use of AD, no falls, however previous PT note had stated 3 recent falls. Does his own cooking and household tasks.   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 On 2/9 he underwent T9-10 laminectomy for spinal cord decompression. He is improving post operatively. Blood cultures are growing GPCs in pairs and chains.   Precautions/Limitations spinal precautions;fall precautions   Heart Disease Risk Factors Diabetes;High blood pressure;Lack of physical activity;Medical history;Gender;Age   General Observations Pt supine in bed, agreeable to PT. RN ok'd session.   General Info Comments Activity: amb with A.    Cognitive Status Examination   Orientation orientation to person, place and time   Level of Consciousness (mildly drowsy)   Follows Commands and Answers Questions 75% of the time;able to follow single-step instructions   Personal Safety and Judgment intact   Pain Assessment   Patient Currently in Pain Yes, see Vital Sign flowsheet  (5-7 back pain)   Integumentary/Edema   Integumentary/Edema Comments Pt with ELISSA LE and UE edema, trace and non-pitting. Will continue to monitor.   Posture    Posture Not impaired   Range of Motion (ROM)   ROM Comment ROM limited 2/2 to strength deficits, otherwise WNL based on functional movements.   Strength   Strength Comments Pt grossly weak in ELISSA LEs, R>L.    Bed Mobility   Bed Mobility Comments mod A supine<>sit   Transfer Skills   Transfer Comments Failed max A x2 sit<>stand, currently dependent for bed<>chair/commode   Gait   Gait Comments NT - unsafe to trial at this time   Balance   Balance Comments Poor sitting balance, requires min-mod A due to heavy posterior lean.    Sensory Examination   Sensory Perception Comments Reports N/T in UEs 2/2 to neuropathy. Denies in LEs, however unable to feel placement of feet on floor. Sensation equal ELISSA.    Muscle Tone   Muscle Tone no deficits were  "identified   General Therapy Interventions   Planned Therapy Interventions balance training;bed mobility training;gait training;neuromuscular re-education;strengthening;transfer training;risk factor education;home program guidelines;progressive activity/exercise   Clinical Impression   Criteria for Skilled Therapeutic Intervention yes, treatment indicated   PT Diagnosis Dec IND and safety with functional mobility   Influenced by the following impairments Balance deficits, gross LE functional strength loss, sensory deficits, pain.   Functional limitations due to impairments Dec IND and safety with bed mobility, transfers, gait, stair negotiation, and activity tolerance for household tasks and ADLs.   Clinical Presentation Evolving/Changing   Clinical Presentation Rationale Requires close monitoring of neuros, large functional change from baseline, clinical judgement.   Clinical Decision Making (Complexity) Moderate complexity   Therapy Frequency` daily   Predicted Duration of Therapy Intervention (days/wks) 2 weeks   Anticipated Equipment Needs at Discharge other (see comments)  (none)   Anticipated Discharge Disposition Transitional Care Facility   Risk & Benefits of therapy have been explained Yes   Patient, Family & other staff in agreement with plan of care Yes   [facility] [employer] \"6 Clicks\"   2016, Trustees of [facility], under license to [employer].  All rights reserved.   6 Clicks Short Forms Basic Mobility Inpatient Short Form   [facility] [employer]  \"6 Clicks\" V.2 Basic Mobility Inpatient Short Form   1. Turning from your back to your side while in a flat bed without using bedrails? 2 - A Lot   2. Moving from lying on your back to sitting on the side of a flat bed without using bedrails? 2 - A Lot   3. Moving to and from a bed to a chair (including a wheelchair)? 1 - Total   4. Standing up from a chair using your arms (e.g., wheelchair, or bedside chair)? 1 - Total   5. To walk in " hospital room? 1 - Total   6. Climbing 3-5 steps with a railing? 1 - Total   Basic Mobility Raw Score (Score out of 24.Lower scores equate to lower levels of function) 8   Total Evaluation Time   Total Evaluation Time (Minutes) 8

## 2017-02-13 NOTE — PLAN OF CARE
Problem: Goal Outcome Summary  Goal: Goal Outcome Summary  Outcome: Improving  Pt is POD#1 of T7-12 Posterior fusion. A&Ox4. AVSS. Neuros intact except BLE weakness. Denies numbness or tingling. Back incision with primapore dressing with significant serosanguinous drainage, marked by previous RN. Hemovac drain in place. BLE 2/5. 5/5 BUE strength. Mepilex dressing to sacrum, clean and dry. Back pain with repositioning. Oxycodone, flexeril and tylenol for pain. IV dilaudid 0.5mg given before up to commode with PT. Pt did not have a bm. Dugan removed this morning at 0930. Due to void. TLSO available for comfort, though pt refuses. Up with lift. Moderate CHO diet. Lantus dose will be increased for tomorrow am dose. Last bg 215 at noon. Give only diet kathy. Pt had thoracic xray today. Continue to monitor.

## 2017-02-14 ENCOUNTER — APPOINTMENT (OUTPATIENT)
Dept: PHYSICAL THERAPY | Facility: CLINIC | Age: 77
DRG: 456 | End: 2017-02-14
Attending: NEUROLOGICAL SURGERY
Payer: MEDICARE

## 2017-02-14 PROCEDURE — 00000146 ZZHCL STATISTIC GLUCOSE BY METER IP

## 2017-02-14 PROCEDURE — 40000802 ZZH SITE CHECK

## 2017-02-14 PROCEDURE — 25000132 ZZH RX MED GY IP 250 OP 250 PS 637: Mod: GY | Performed by: NEUROLOGICAL SURGERY

## 2017-02-14 PROCEDURE — A9270 NON-COVERED ITEM OR SERVICE: HCPCS | Mod: GY | Performed by: NEUROLOGICAL SURGERY

## 2017-02-14 PROCEDURE — 25000132 ZZH RX MED GY IP 250 OP 250 PS 637: Mod: GY | Performed by: STUDENT IN AN ORGANIZED HEALTH CARE EDUCATION/TRAINING PROGRAM

## 2017-02-14 PROCEDURE — 40000193 ZZH STATISTIC PT WARD VISIT: Performed by: PHYSICAL THERAPIST

## 2017-02-14 PROCEDURE — A9270 NON-COVERED ITEM OR SERVICE: HCPCS | Mod: GY | Performed by: STUDENT IN AN ORGANIZED HEALTH CARE EDUCATION/TRAINING PROGRAM

## 2017-02-14 PROCEDURE — 25000125 ZZHC RX 250: Performed by: NURSE PRACTITIONER

## 2017-02-14 PROCEDURE — 25000128 H RX IP 250 OP 636: Performed by: INTERNAL MEDICINE

## 2017-02-14 PROCEDURE — 97530 THERAPEUTIC ACTIVITIES: CPT | Mod: GP | Performed by: PHYSICAL THERAPIST

## 2017-02-14 PROCEDURE — 12000008 ZZH R&B INTERMEDIATE UMMC

## 2017-02-14 PROCEDURE — 99232 SBSQ HOSP IP/OBS MODERATE 35: CPT | Performed by: INTERNAL MEDICINE

## 2017-02-14 RX ADMIN — CYCLOBENZAPRINE HYDROCHLORIDE 5 MG: 5 TABLET, FILM COATED ORAL at 16:50

## 2017-02-14 RX ADMIN — OXYCODONE HYDROCHLORIDE 10 MG: 5 TABLET ORAL at 06:31

## 2017-02-14 RX ADMIN — CYCLOBENZAPRINE HYDROCHLORIDE 5 MG: 5 TABLET, FILM COATED ORAL at 07:16

## 2017-02-14 RX ADMIN — OXYCODONE HYDROCHLORIDE 5 MG: 5 TABLET ORAL at 00:54

## 2017-02-14 RX ADMIN — ACETAMINOPHEN 650 MG: 325 TABLET, FILM COATED ORAL at 00:54

## 2017-02-14 RX ADMIN — ACETAMINOPHEN 650 MG: 325 TABLET, FILM COATED ORAL at 23:06

## 2017-02-14 RX ADMIN — INSULIN ASPART 3 UNITS: 100 INJECTION, SOLUTION INTRAVENOUS; SUBCUTANEOUS at 14:01

## 2017-02-14 RX ADMIN — PANTOPRAZOLE SODIUM 40 MG: 40 TABLET, DELAYED RELEASE ORAL at 07:49

## 2017-02-14 RX ADMIN — OXYCODONE HYDROCHLORIDE 10 MG: 5 TABLET ORAL at 23:06

## 2017-02-14 RX ADMIN — ACETAMINOPHEN 650 MG: 325 TABLET, FILM COATED ORAL at 13:55

## 2017-02-14 RX ADMIN — ATENOLOL 25 MG: 25 TABLET ORAL at 07:50

## 2017-02-14 RX ADMIN — OXYCODONE HYDROCHLORIDE 10 MG: 5 TABLET ORAL at 18:13

## 2017-02-14 RX ADMIN — SODIUM CHLORIDE, PRESERVATIVE FREE 5 ML: 5 INJECTION INTRAVENOUS at 16:50

## 2017-02-14 RX ADMIN — CEFTRIAXONE 2 G: 2 INJECTION, POWDER, FOR SOLUTION INTRAMUSCULAR; INTRAVENOUS at 02:43

## 2017-02-14 RX ADMIN — INSULIN ASPART 4 UNITS: 100 INJECTION, SOLUTION INTRAVENOUS; SUBCUTANEOUS at 16:49

## 2017-02-14 RX ADMIN — INSULIN ASPART 3 UNITS: 100 INJECTION, SOLUTION INTRAVENOUS; SUBCUTANEOUS at 07:44

## 2017-02-14 RX ADMIN — CEFTRIAXONE 2 G: 2 INJECTION, POWDER, FOR SOLUTION INTRAMUSCULAR; INTRAVENOUS at 13:42

## 2017-02-14 RX ADMIN — TAMSULOSIN HYDROCHLORIDE 0.8 MG: 0.4 CAPSULE ORAL at 22:17

## 2017-02-14 RX ADMIN — OXYCODONE HYDROCHLORIDE 10 MG: 5 TABLET ORAL at 10:25

## 2017-02-14 RX ADMIN — ACETAMINOPHEN 650 MG: 325 TABLET, FILM COATED ORAL at 06:31

## 2017-02-14 RX ADMIN — OXYCODONE HYDROCHLORIDE 10 MG: 5 TABLET ORAL at 13:55

## 2017-02-14 RX ADMIN — ATORVASTATIN CALCIUM 40 MG: 40 TABLET, FILM COATED ORAL at 07:49

## 2017-02-14 RX ADMIN — ACETAMINOPHEN 650 MG: 325 TABLET, FILM COATED ORAL at 18:13

## 2017-02-14 ASSESSMENT — VISUAL ACUITY
OU: NORMAL ACUITY;GLASSES

## 2017-02-14 NOTE — PLAN OF CARE
Problem: Goal Outcome Summary  Goal: Goal Outcome Summary  Outcome: No Change     Pt is POD#1 T7-12 Posterior fusion. A&Ox4. AVSS. Pain pain managed with oxy, Flexeril, and Tylenol. Neuros intact except BLE weakness 2/5. Denies n/t. Back incision covered with primapore dressing, dried serosanguinous drainage, marked with no change. Hemovac drain in place with serosanguinous output. Mepilex dressing to sacrum, clean and dry. Pt refused tap water enema this evening, states will do it tomorrow if no BM before tomorrow. Attempted to have BM on bedpan x1 but no results. Voiding spont, bladder scan after first void post-schuler removal was 92. TLSO available for comfort. Pt gets up with lift. Moderate CHO diet. Lantus dose for tomorrow am increased. Please give only diet kathy. Continue POC.

## 2017-02-14 NOTE — PLAN OF CARE
Problem: Goal Outcome Summary  Goal: Goal Outcome Summary  Outcome: No Change  POD#2 T7-12 Posterior fusion. A&Ox4. VSS except HTN. C/o pain managed with oxy, Flexeril, and Tylenol. Neuros intact except BLE weakness 2/5. Denies n/t. Back incision covered with primapore dressing, marked drainage no change from previous shift. Hemovac drain in place with serosanguinous output. Mepilex dressing to sacrum, clean and dry. Voiding spont, bladder scan. Small BMx1. Repositioning q2hrs. TLSO available for comfort. Up with a lift. Moderate CHO diet. Lantus dose increased for this AM. Continue to monitor and follow POC.

## 2017-02-14 NOTE — PLAN OF CARE
Problem: Goal Outcome Summary  Goal: Goal Outcome Summary  Outcome: Improving  D/I:Sl confused at times.R LE 3-. LLE 3. Denies N/T. UE = strong.Was able to stand at bedside with therapy. Up to chair with lift for 1 hour, did not need TLSO.Dressing with old drainage. Hemovac in place still with scant output this shift.Oxy, Tylenol and Flexeril for back pain.Meplex in place on sacrum.Voiding spont with urinal.Has DL PICC and PIV.  P:Plan is for rehab soon.

## 2017-02-14 NOTE — CONSULTS
"Social Work: Assessment with Discharge Plan    Patient Name:  Tad Manning  :  1940  Age:  76 year old  MRN:  [id number]  Risk/Complexity Score:  Filed Complexity Screen Score: 8  Completed assessment with: Patient     Presenting Information   Reason for Referral:  Discharge plan  Date of Intake:  2017  Referral Source:  PT/OT Reccomendation  Decision Maker:  Patient  Alternate Decision Maker:  Patient's brother, Seferino Manning   Health Care Directive:  Provided Copy  Living Situation:  Apartment  Previous Functional Status:  Independent  Patient and family understanding of hospitalization:  Patient reports that he understands why he (pt) is being hospitalized and does not have any questions at this time.   Cultural/Language/Spiritual Considerations:  Patient does not report any concerns.   Adjustment to Illness:  Patient stated his adjustment is \"not too bad\" since pt is getting better.     Physical Health  Reason for Admission:  No diagnosis found.  Services Needed/Recommended:  ARU    Mental Health/Chemical Dependency  Diagnosis:  Patient reports no HX of MH Diagnosis or CD.   Support/Services in Place:  N/A   Services Needed/Recommended:  N/A    Support System  Significant relationship at present time:  Patient reported living with his (pt's) significant other, Felecia (Ava).   Family of origin is available for support:  Patient reported that brother is very supportive of patient and is alternative decision maker. SW did LM for pt's brother for further clarification and to update him on d/c planning.   Other support available:  Pt reported that his niece, Mily, is also supportive of patient.   Gaps in support system:  Patient denied any gaps in support system.   Patient is caregiver to:  None  Patient did note that pt pays rent for both pt and girlfriend.     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 Patient also reports getting SSI.   Financial Concerns:  Pt stated that he (pt) \"always\" has ongoing concerns. SW spoke with pt about county assistance and talking with financial counselors about applying for [place]. Pt reported that he (pt) did not think he (pt) would qualify for any assistance due to income. SW encouraged pt to speak further with financial counselors for guidance and questions. Pt declined at this time.   Insurance:    Payor/Plan Subscriber Name Rel Member # Group #   MEDICARE - MEDICARE [employer]ER [place]*  [id number]       ATTN CLAIMS, [address]   BCBS - BCBS OF [place]*  [id number]      [address] [address]       Discharge Plan   Patient and family discharge goal:  Recommendation for pt to transfer to ARU.   Provided education on discharge plan:  YES  Patient agreeable to discharge plan:  YES  A list of Medicare Certified Facilities was provided to the patient and/or family to encourage patient choice. Patient's choices for facility are:  Pt reported first choice would be for pt to transfer to [facility]. Pt reported second choice [facility] ARU  Will NH provide Skilled rehabilitation or complex medical:  YES  General information regarding anticipated insurance coverage and possible out of pocket cost was discussed. Patient and patient's family are aware patient may incur the cost of transportation to the facility, pending insurance payment: YES  Barriers to discharge:  Medical readiness     Discharge Recommendations   Anticipated Disposition:  Facility:  ARU  Transportation Needs:  Medical:  Wheelchair  Name of Transportation Company and Phone:  [employer] [phone]    Additional comments   Per permission of pt, TYSON attempted to reach pt's brother, Seferino. SW did leave " message for pt's brother regarding d/c plan. SW will attempt to reach pt's brother again.     Anticipated D/C Date: 2/14/17   -Referral sent to Naomi, rehab admission for [facility]. Patient is on wait list for bed for 2/15/17.   -[facility] ([phone]). TYSON LM for admissions ([phone]).      DEMETRIUS Mendez covering pt on 6A.  Pager: [phone]

## 2017-02-14 NOTE — PROGRESS NOTES
Hospital Medicine  Follow-up Consult Note  Date of Service: February 13, 2017    Patient: Tad Manning  MRN: [id number]  Admission Date: 2/9/2017  Hospital Day # 5      Assessment & Plan: Tad Manning is a 76 year old man with a history of CAD s/p 3v CABG (1995), PAD s/p bilateral LE stents,  IDDM2,  CLL in remission, admitted to the Neurosurgical service from OSH with T9-10 discitis and epidural absecess s/p T9-10 laminectomy for spinal cord decompression. Admission blood cultures growing strep mitis.  Now s/p T7-12 posterior fusion with TLIF T9-10 on 2/12/17.    # T9-10 discitis w/ epidural abscess s/p laminectomy s/p T7-T12 posterior fusion w/ TLIF T9-10 on 2/12.  # Streptococcus mitis bacteremia    - agree with ceftriaxone 2g IV q12h  - pain control per primary team  - Primary team planning to start dvt ppx on Thursday 2/16    # CAD s/p 3v CABG (1995)  # HFrEF (EF 40-45%)  Remains asymptomatic post-operatively. Appears euvolemic. Heart failure currently compensated.   - continue atenolol  - restart aspirin as soon as is deemed safe from post-op bleeding perspective (for BLE stents as well as CAD). Primary team planning to restart Wednesday 2/15.   - daily weight  - diabetic/low sodium diet     # PAD s/p bilateral iliac artery stents (2013) and LLE bypass (2014)  The patient's home ASA and plavix have been held since admission in anticipation of surgery.  Now that we are 3+ years out from the patient's stenting procedure, dual antiplatelet therapy becomes less essential.   - Agree with plan to restart aspirin on Wednesday 2/15.  - Restart plavix on discharge    # DM2, insulin dependent  Had just been started on lantus (5 units) prior to admission. A1c 6.1%. Improving BS now that he is off of full sugar soda.   - increased lantus to 10 units qAM on 2/14. Will monitor BG on this.   - continue high correctional scale  - hold home oral hypoglycemics  - switch from Ensure to Glucerna   - diet soda  only    # Constipation:  - miralax, senna/docusate  - bisacodyl suppository daily  - enema today    CODE: Full   Diet/IVF: Diabetic, low sodium diet, Glucerna, diet soda only  DVT ppx: SCDs. Pharm ppx timing deferred to primary team    Thank you for involving us in the care of Mr. Manning. We will continue to follow along with you.     Pt's care was discussed with bedside RN.    Avinash Olson MD   of Medicine  Med-Peds Hospitalist  Pager [phone]    Team: Medicine Crist[facility] Consult  Page Cross Cover after 5 pm: pager [phone]   ___________________________________________________________________    Subjective & Interval Hx:    Nursing notes reviewed. Still having some back pain but is manageable. Feels that he his getting some sensation back in feet. Stooling now. Up to chair this am.    Medications: Reviewed in EPIC.     Physical Exam:    Blood pressure 142/72, pulse 90, temperature 96.6  F (35.9  C), temperature source Oral, resp. rate 16, SpO2 90 %.    General: NAD. Pleasant.  Chest/Resp: Lungs CTAB anteriorly. Nonlabored breathing.    Heart/CV: RRR. Normal S1/S2. No extra heart sounds. Non-displaced PMI. No JVD. No LE edema.  Abdomen/GI: Soft, nontender, nondistended.    Extremities/MSK: Warm, well perfused.    Skin: No rashes.    Neuro: Alert. Oriented x3. Normal speech. 4/5 strength in feet, decreased but improving sensation.  Psych: Appropriate mood.       Labs & Studies of Note: I personally all lab and imaging studies in the last 24h.     Glucoses < 200 in past 12 hrs    All cultures:    Recent Labs  Lab 02/09/17  2329 02/09/17  2206 02/09/17  0334 02/09/17  0153 02/08/17  1945   CULT No growth after 4 days No growth after 5 days Moderate growth Streptococcus mitis groupThese bacteria are part of normal skin himanshu, but on occasion, may be true pathogens.  Clinical correlation must be applied to interpreting this microbiology result.*  Culture negative monitoring continues No growth  On day 2,  isolated in broth only: Streptococcus mitis group Susceptibility testing done on previous specimenCulture in progress* Cultured on the 1st day of incubation: Streptococcus oralisCritical Value/Significant Value, preliminary result only, called to and read back by Florin Villegas RN 4A @ 1604 2/9/17 CSSusceptibility testing done on previous specimen*  Cultured on the 1st day of incubation: Streptococcus oralisCritical Value/Significant Value, preliminary result only, called to and read back by Florin Villegas RN 4A @ 1605 2/9/17 CS(Note)POSITIVE for STREPTOCOCCUS SPECIES OTHER THAN pneumococcus, anginosusgroup, S. pyogenes and S. agalactiae. Performed using Lyxiamultiplex nucleic acid test. Final identification and antimicrobialsusceptibility testing will be verified by standard methods.Specimen tested with Shoobsigene multiplex, gram-positive blood culturenucleic acid test for the following targets: Staph aureus, Staphepidermidis, Staph lugdunensis, other Staph species, Enterococcusfaecalis, Enterococcus faecium, Streptococcus species, S. agalactiae,S. anginosus grp., S. pneumoniae, S. pyogenes, Listeria sp., mecA(methicillin resistance) and Mckenna/B (vancomycin resistance).Critical Value/Significant Value called to and read back by Darryl RN @2934 2/9/17. ct*

## 2017-02-14 NOTE — PROGRESS NOTES
CLINICAL NUTRITION SERVICES - BRIEF NOTE    Nutrition Prescription    RECOMMENDATIONS FOR MDs/PROVIDERS TO ORDER:  - none additional     Recommendations already ordered by Registered Dietitian (RD):  - ordered calorie counts  - continue oral nutrition supplements    Future/Additional Recommendations:  - ongoing PO/supp adequacy; calorie counts      Nutrition Progress Note - f/u for progress towards previous nutrition POC (see previous 2/10  reassessment for details)     Omero Roth RD, LD  Neuro ICU  Pager: [phone]

## 2017-02-14 NOTE — PLAN OF CARE
Problem: Goal Outcome Summary  Goal: Goal Outcome Summary  PT 6A: Pt improving with mobility today, still stating pain but has 3-/5 RLE strength and 3+/5 LLE strength. Needs mod Ax2 for rolling to sit EOB, sits with min Ax1. Performs sit<>stand x2 with max Ax2 and bilateral knee block. Able to fully clear buttocks, stands for ~20 seconds which is a noted improvement in strength from previous PT session. Pt still needing use of the golvo to lift to the chair. Pt continues to be motivated for therapy, anticipate the pt will discharge to ARU.

## 2017-02-14 NOTE — PROGRESS NOTES
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 On 2/9 he underwent T9-10 laminectomy for spinal cord decompression. He is improving post operatively. Blood cultures are growing GPCs in pairs and chains.    On 2/13 he underwent T7-12 posterior fusion with TLIF T9-10.   - dc drain  - dc TLSO  - restart ASA Wed, DVT Thursday, plavix on DC   - cultures growing strep Mitis - continue ceftriaxone and follow up susceptibilities; PICC in place   - PT/OT - ARU vs. TCU  - SCDs                  Interval History:   Slight improvement in motor function.              Review of Systems:   The Review of Systems is otherwise negative beyond that which has been previously stated.          Medications:   I have reviewed this patient's current medications.    Current Facility-Administered Medications   Medication Dose Route Frequency     pantoprazole  40 mg Oral QAM     insulin glargine  10 Units Subcutaneous QAM AC     cefTRIAXone  2 g Intravenous Q12H     polyethylene glycol  17 g Oral Daily     atenolol  25 mg Oral Daily     heparin lock flush  5-10 mL Intracatheter Q24H     bisacodyl  10 mg Rectal Daily     atorvastatin  40 mg Oral Daily     tamsulosin  0.8 mg Oral At Bedtime     sodium chloride (PF)  3 mL Intracatheter Q8H     insulin aspart  1-10 Units Subcutaneous TID AC     insulin aspart  1-7 Units Subcutaneous At Bedtime     senna-docusate  1-2 tablet Oral BID        Physical Exam:  General:   Comfortable respiratory effort; normal cardiac rhythm.     Mental Status:    awake, alert and oriented x3.    fluent,  communicative, intact comprehension.    Cranial Nerves:  equal and reactive pupils    extraocular movements preserved    no dysarthria    Motor/Sensory:  2/5 throughout the right lower extremity; left lower extremity - 4/5 plantar flexion, 3/5 dorsi flexion and EHL, otherwise 3/5 at the knee and hip.   No deficits to pain, soft touch.    Intact perianal sensation      Reflexes: hypoactive in the lower extremities         Data:   The labs and imaging for this patient have been reviewed directly.

## 2017-02-14 NOTE — PROGRESS NOTES
[facility] ID SERVICE: ONGOING CONSULTATION     Patient:  Tad Manning, Date of birth 1940, Medical record number [id number]  Date of Visit:  02/14/2017         Assessment and Recommendations:   Problem List:  1. 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 Streptococcus mitis bacteremia - cleared quickly. TTE without vegetations early in illness.   3. 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 PVD with bilateral LE stents on ASA/plvx  5. Chronic lymphocytic leukemia in remission (last chemo in Sept 2015)    Recommendations:  - Continue ceftriaxone 2g IV Q12H for at least 6 weeks (through 3/24/17)  - Check CBC, CRP, creatinine, and LFTs weekly while on therapy and have results faxed to the [facility] at [phone].   - Follow up with me in clinic on 3/23/17 - please make appointment upon hospital discharge.   - If any signs/symptoms of heart failure, recommend repeating echocardiogram    Discussion:  Patient with PMH CLL in remission, DMII, PVD admitted as transfer 2/9/2017 with thoracic back pain and LE weakness - found to have T9-10 discitis with overlying epidural abscess, now s/p laminectomy/abscess evacuation with intraoperative cultures and  blood cultures showing Streptococcus mitis (oral himanshu). Likely mouth source. He will need at least 6 weeks of IV ceftriaxone and then we may continue an oral antibiotic after that time since he had new hardware placed in the setting of infection. He had a negative TTE early in the course of his illness (before it was recognized that he had an infection). The six weeks of antibiotics planned would treat endocarditis as well, but would repeat TTE if he develops new signs/symptoms of heart failure.     It has been a pleasure to be involved with this patient's care. We will sign off for now, but please feel free to call the [facility] general ID team  with any questions.      Gillian  Winter FIGUEROA  Infectious Diseases  [phone]        Subjective/Interval events:   S/p T7-12 posterior fusion on 2/12/17. Feels he is healing well. No further positive blood cultures. Afebrile over last 24 hours. Mild confusion note din chart, but not confused when seen today.          Review of Systems:   No new rashes. No new difficulty breathing. Looking forward to TCU discharge, possibly as soon as tomorrow.         Recent Antimicrobials::   Vancomycin 2/9-2/12  Meropenem 2/9-2/12  Zosyn 2/9  Ceftriaxone 2/12-present         Physical Exam:   Ranges forvital signs:  Temp:  [96.6  F (35.9  C)-99.1  F (37.3  C)] 96.7  F (35.9  C)  Pulse:  [82-90] 82  Heart Rate:  [81-86] 86  Resp:  [16] 16  BP: (114-158)/(53-72) 114/53  SpO2:  [90 %-97 %] 97 %    Exam:  GENERAL:  well-developed, well-nourished, lying  bed in no acute distress.   ENT:  Head is normocephalic, atraumatic. Oropharynx is moist   EYES:  Eyes have anicteric sclerae, no injection  EXT: Extremities warm and without edema  SKIN:  No acute rashes; no stigmata of endocarditis. PICC in place.   NEUROLOGIC: AAO x 3, conversational         Laboratory Data:     Creatinine   Date Value Ref Range Status   02/13/2017 0.62 (L) 0.66 - 1.25 mg/dL Final   02/11/2017 0.68 0.66 - 1.25 mg/dL Final   02/10/2017 0.68 0.66 - 1.25 mg/dL Final   02/09/2017 0.81 0.66 - 1.25 mg/dL Final   02/07/2017 0.75 0.66 - 1.25 mg/dL Final     WBC   Date Value Ref Range Status   02/13/2017 9.9 4.0 - 11.0 10e9/L Final   02/11/2017 6.8 4.0 - 11.0 10e9/L Final   02/10/2017 8.8 4.0 - 11.0 10e9/L Final   02/09/2017 8.6 4.0 - 11.0 10e9/L Final   02/09/2017 8.8 4.0 - 11.0 10e9/L Final     Hemoglobin   Date Value Ref Range Status   02/13/2017 8.2 (L) 13.3 - 17.7 g/dL Final     Platelet Count   Date Value Ref Range Status   02/13/2017 198 150 - 450 10e9/L Final     Lab Results   Component Value Date     (L) 02/13/2017    BUN 11 02/13/2017    CO2 24 02/13/2017          Pertinent Recent Microbiology  Data:   2/8/17 2/2 BC with S. mitis  2/9/17 Spinal bone culture NGTD  2/9/17 Spinal fluid culture x 2 with GPCs         Imaging:   No new imaging today.

## 2017-02-15 ENCOUNTER — HOSPITAL ENCOUNTER (INPATIENT)
Facility: CLINIC | Age: 77
LOS: 32 days | Discharge: NURSING, REHAB OR LONG-TERM CARE | DRG: 949 | End: 2017-03-19
Attending: PHYSICAL MEDICINE & REHABILITATION | Admitting: PHYSICAL MEDICINE & REHABILITATION
Payer: MEDICARE

## 2017-02-15 ENCOUNTER — APPOINTMENT (OUTPATIENT)
Dept: OCCUPATIONAL THERAPY | Facility: CLINIC | Age: 77
DRG: 456 | End: 2017-02-15
Attending: NEUROLOGICAL SURGERY
Payer: MEDICARE

## 2017-02-15 ENCOUNTER — APPOINTMENT (OUTPATIENT)
Dept: PHYSICAL THERAPY | Facility: CLINIC | Age: 77
DRG: 456 | End: 2017-02-15
Attending: NEUROLOGICAL SURGERY
Payer: MEDICARE

## 2017-02-15 VITALS
DIASTOLIC BLOOD PRESSURE: 64 MMHG | SYSTOLIC BLOOD PRESSURE: 122 MMHG | OXYGEN SATURATION: 98 % | HEART RATE: 79 BPM | TEMPERATURE: 96.1 F | RESPIRATION RATE: 16 BRPM

## 2017-02-15 DIAGNOSIS — I10 ESSENTIAL HYPERTENSION: ICD-10-CM

## 2017-02-15 DIAGNOSIS — T50.1X5D LOOP DIURETIC CAUSING ADVERSE EFFECT IN THERAPEUTIC USE, SUBSEQUENT ENCOUNTER: ICD-10-CM

## 2017-02-15 DIAGNOSIS — I50.21 ACUTE SYSTOLIC HEART FAILURE (H): ICD-10-CM

## 2017-02-15 DIAGNOSIS — K21.9 GASTROESOPHAGEAL REFLUX DISEASE WITHOUT ESOPHAGITIS: ICD-10-CM

## 2017-02-15 DIAGNOSIS — F51.01 PRIMARY INSOMNIA: Primary | ICD-10-CM

## 2017-02-15 DIAGNOSIS — G06.2 EPIDURAL ABSCESS: ICD-10-CM

## 2017-02-15 DIAGNOSIS — E11.42 TYPE 2 DIABETES MELLITUS WITH DIABETIC POLYNEUROPATHY, WITHOUT LONG-TERM CURRENT USE OF INSULIN (H): ICD-10-CM

## 2017-02-15 DIAGNOSIS — I48.92 ATRIAL FLUTTER, UNSPECIFIED TYPE (H): ICD-10-CM

## 2017-02-15 DIAGNOSIS — M54.5 ACUTE MIDLINE LOW BACK PAIN, WITH SCIATICA PRESENCE UNSPECIFIED: ICD-10-CM

## 2017-02-15 PROBLEM — M46.24 OSTEOMYELITIS OF THORACIC SPINE (H): Status: ACTIVE | Noted: 2017-02-15

## 2017-02-15 LAB
BACTERIA SPEC CULT: NO GROWTH
GLUCOSE BLDC GLUCOMTR-MCNC: 159 MG/DL (ref 70–99)
GLUCOSE BLDC GLUCOMTR-MCNC: 162 MG/DL (ref 70–99)
MICRO REPORT STATUS: NORMAL
SPECIMEN SOURCE: NORMAL

## 2017-02-15 PROCEDURE — 25000125 ZZHC RX 250: Performed by: NURSE PRACTITIONER

## 2017-02-15 PROCEDURE — 82962 GLUCOSE BLOOD TEST: CPT

## 2017-02-15 PROCEDURE — 25000128 H RX IP 250 OP 636: Performed by: STUDENT IN AN ORGANIZED HEALTH CARE EDUCATION/TRAINING PROGRAM

## 2017-02-15 PROCEDURE — 25000132 ZZH RX MED GY IP 250 OP 250 PS 637: Mod: GY | Performed by: STUDENT IN AN ORGANIZED HEALTH CARE EDUCATION/TRAINING PROGRAM

## 2017-02-15 PROCEDURE — 25000128 H RX IP 250 OP 636: Performed by: INTERNAL MEDICINE

## 2017-02-15 PROCEDURE — A9270 NON-COVERED ITEM OR SERVICE: HCPCS | Mod: GY | Performed by: STUDENT IN AN ORGANIZED HEALTH CARE EDUCATION/TRAINING PROGRAM

## 2017-02-15 PROCEDURE — 97110 THERAPEUTIC EXERCISES: CPT | Mod: GP

## 2017-02-15 PROCEDURE — 25000132 ZZH RX MED GY IP 250 OP 250 PS 637: Mod: GY | Performed by: NURSE PRACTITIONER

## 2017-02-15 PROCEDURE — A9270 NON-COVERED ITEM OR SERVICE: HCPCS | Mod: GY | Performed by: NEUROLOGICAL SURGERY

## 2017-02-15 PROCEDURE — 40000133 ZZH STATISTIC OT WARD VISIT

## 2017-02-15 PROCEDURE — A9270 NON-COVERED ITEM OR SERVICE: HCPCS | Mod: GY | Performed by: INTERNAL MEDICINE

## 2017-02-15 PROCEDURE — 00000146 ZZHCL STATISTIC GLUCOSE BY METER IP

## 2017-02-15 PROCEDURE — 97530 THERAPEUTIC ACTIVITIES: CPT | Mod: GO

## 2017-02-15 PROCEDURE — 97530 THERAPEUTIC ACTIVITIES: CPT | Mod: GP

## 2017-02-15 PROCEDURE — 99222 1ST HOSP IP/OBS MODERATE 55: CPT | Performed by: INTERNAL MEDICINE

## 2017-02-15 PROCEDURE — A9270 NON-COVERED ITEM OR SERVICE: HCPCS | Mod: GY | Performed by: NURSE PRACTITIONER

## 2017-02-15 PROCEDURE — 25000132 ZZH RX MED GY IP 250 OP 250 PS 637: Mod: GY | Performed by: NEUROLOGICAL SURGERY

## 2017-02-15 PROCEDURE — 40000802 ZZH SITE CHECK

## 2017-02-15 PROCEDURE — 25000131 ZZH RX MED GY IP 250 OP 636 PS 637: Mod: GY | Performed by: INTERNAL MEDICINE

## 2017-02-15 PROCEDURE — 12800006 ZZH R&B REHAB

## 2017-02-15 PROCEDURE — 40000193 ZZH STATISTIC PT WARD VISIT

## 2017-02-15 PROCEDURE — 25000125 ZZHC RX 250: Performed by: PHYSICAL MEDICINE & REHABILITATION

## 2017-02-15 PROCEDURE — 25000132 ZZH RX MED GY IP 250 OP 250 PS 637: Mod: GY | Performed by: INTERNAL MEDICINE

## 2017-02-15 RX ORDER — DEXTROSE MONOHYDRATE 25 G/50ML
25-50 INJECTION, SOLUTION INTRAVENOUS
Status: DISCONTINUED | OUTPATIENT
Start: 2017-02-15 | End: 2017-03-19 | Stop reason: HOSPADM

## 2017-02-15 RX ORDER — OXYCODONE HYDROCHLORIDE 5 MG/1
5-10 TABLET ORAL
Qty: 100 TABLET | Refills: 0 | Status: ON HOLD | DISCHARGE
Start: 2017-02-15 | End: 2017-03-18

## 2017-02-15 RX ORDER — CEFTRIAXONE 2 G/1
2 INJECTION, POWDER, FOR SOLUTION INTRAMUSCULAR; INTRAVENOUS EVERY 12 HOURS
Status: DISCONTINUED | OUTPATIENT
Start: 2017-02-16 | End: 2017-03-19 | Stop reason: HOSPADM

## 2017-02-15 RX ORDER — NICOTINE POLACRILEX 4 MG
15-30 LOZENGE BUCCAL
Status: DISCONTINUED | OUTPATIENT
Start: 2017-02-15 | End: 2017-03-19 | Stop reason: HOSPADM

## 2017-02-15 RX ORDER — HEPARIN SODIUM,PORCINE 10 UNIT/ML
5-10 VIAL (ML) INTRAVENOUS EVERY 24 HOURS
Status: DISCONTINUED | OUTPATIENT
Start: 2017-02-15 | End: 2017-03-19 | Stop reason: HOSPADM

## 2017-02-15 RX ORDER — POLYETHYLENE GLYCOL 3350 17 G/17G
17 POWDER, FOR SOLUTION ORAL DAILY PRN
Status: DISCONTINUED | OUTPATIENT
Start: 2017-02-15 | End: 2017-02-17

## 2017-02-15 RX ORDER — CEFTRIAXONE 2 G/1
2 INJECTION, POWDER, FOR SOLUTION INTRAMUSCULAR; INTRAVENOUS EVERY 12 HOURS
Qty: 5 EACH | Refills: 0 | Status: ON HOLD
Start: 2017-02-15 | End: 2017-03-18

## 2017-02-15 RX ORDER — OXYCODONE HYDROCHLORIDE 5 MG/1
5-10 TABLET ORAL
Status: DISCONTINUED | OUTPATIENT
Start: 2017-02-15 | End: 2017-02-19

## 2017-02-15 RX ORDER — TAMSULOSIN HYDROCHLORIDE 0.4 MG/1
0.8 CAPSULE ORAL AT BEDTIME
Status: DISCONTINUED | OUTPATIENT
Start: 2017-02-15 | End: 2017-03-19 | Stop reason: HOSPADM

## 2017-02-15 RX ORDER — ATENOLOL 25 MG/1
25 TABLET ORAL DAILY
Status: DISCONTINUED | OUTPATIENT
Start: 2017-02-16 | End: 2017-02-24

## 2017-02-15 RX ORDER — ASPIRIN 325 MG
325 TABLET ORAL DAILY
Status: DISCONTINUED | OUTPATIENT
Start: 2017-02-15 | End: 2017-02-15 | Stop reason: HOSPADM

## 2017-02-15 RX ORDER — CYCLOBENZAPRINE HCL 5 MG
5 TABLET ORAL EVERY 8 HOURS PRN
Qty: 42 TABLET | Refills: 0 | Status: ON HOLD | DISCHARGE
Start: 2017-02-15 | End: 2017-03-18

## 2017-02-15 RX ORDER — CYCLOBENZAPRINE HCL 5 MG
5 TABLET ORAL EVERY 8 HOURS PRN
Status: DISCONTINUED | OUTPATIENT
Start: 2017-02-15 | End: 2017-02-21

## 2017-02-15 RX ORDER — ATORVASTATIN CALCIUM 40 MG/1
40 TABLET, FILM COATED ORAL DAILY
Status: DISCONTINUED | OUTPATIENT
Start: 2017-02-16 | End: 2017-03-19 | Stop reason: HOSPADM

## 2017-02-15 RX ORDER — HEPARIN SODIUM,PORCINE 10 UNIT/ML
5-10 VIAL (ML) INTRAVENOUS
Status: DISCONTINUED | OUTPATIENT
Start: 2017-02-15 | End: 2017-03-19 | Stop reason: HOSPADM

## 2017-02-15 RX ORDER — ASPIRIN 325 MG
325 TABLET ORAL DAILY
Status: DISCONTINUED | OUTPATIENT
Start: 2017-02-16 | End: 2017-03-19 | Stop reason: HOSPADM

## 2017-02-15 RX ORDER — LIDOCAINE 50 MG/G
1 PATCH TOPICAL DAILY PRN
Status: DISCONTINUED | OUTPATIENT
Start: 2017-02-15 | End: 2017-03-16

## 2017-02-15 RX ORDER — AMOXICILLIN 250 MG
1-2 CAPSULE ORAL 2 TIMES DAILY PRN
Status: DISCONTINUED | OUTPATIENT
Start: 2017-02-15 | End: 2017-02-17

## 2017-02-15 RX ORDER — ASPIRIN 325 MG
325 TABLET ORAL DAILY
Qty: 120 TABLET | Refills: 0 | Status: ON HOLD | DISCHARGE
Start: 2017-02-15 | End: 2017-04-08

## 2017-02-15 RX ORDER — ACETAMINOPHEN 325 MG/1
650 TABLET ORAL EVERY 4 HOURS PRN
Status: DISCONTINUED | OUTPATIENT
Start: 2017-02-15 | End: 2017-02-19

## 2017-02-15 RX ORDER — ASCORBIC ACID 500 MG
500 TABLET ORAL DAILY
Status: DISCONTINUED | OUTPATIENT
Start: 2017-02-16 | End: 2017-03-19 | Stop reason: HOSPADM

## 2017-02-15 RX ORDER — ACETAMINOPHEN 325 MG/1
650 TABLET ORAL EVERY 4 HOURS PRN
Qty: 100 TABLET | Refills: 0 | Status: ON HOLD | DISCHARGE
Start: 2017-02-15 | End: 2017-04-08

## 2017-02-15 RX ORDER — NALOXONE HYDROCHLORIDE 0.4 MG/ML
.1-.4 INJECTION, SOLUTION INTRAMUSCULAR; INTRAVENOUS; SUBCUTANEOUS
Status: DISCONTINUED | OUTPATIENT
Start: 2017-02-15 | End: 2017-03-19 | Stop reason: HOSPADM

## 2017-02-15 RX ADMIN — POLYETHYLENE GLYCOL 3350 17 G: 17 POWDER, FOR SOLUTION ORAL at 07:45

## 2017-02-15 RX ADMIN — OXYCODONE HYDROCHLORIDE 10 MG: 5 TABLET ORAL at 11:36

## 2017-02-15 RX ADMIN — ENOXAPARIN SODIUM 40 MG: 40 INJECTION SUBCUTANEOUS at 11:40

## 2017-02-15 RX ADMIN — OXYCODONE HYDROCHLORIDE 10 MG: 5 TABLET ORAL at 21:17

## 2017-02-15 RX ADMIN — ACETAMINOPHEN 650 MG: 325 TABLET, FILM COATED ORAL at 07:47

## 2017-02-15 RX ADMIN — CEFTRIAXONE 2 G: 2 INJECTION, POWDER, FOR SOLUTION INTRAMUSCULAR; INTRAVENOUS at 02:54

## 2017-02-15 RX ADMIN — SODIUM CHLORIDE, PRESERVATIVE FREE 5 ML: 5 INJECTION INTRAVENOUS at 21:17

## 2017-02-15 RX ADMIN — INSULIN ASPART 3 UNITS: 100 INJECTION, SOLUTION INTRAVENOUS; SUBCUTANEOUS at 12:08

## 2017-02-15 RX ADMIN — ACETAMINOPHEN 650 MG: 325 TABLET, FILM COATED ORAL at 02:54

## 2017-02-15 RX ADMIN — TAMSULOSIN HYDROCHLORIDE 0.8 MG: 0.4 CAPSULE ORAL at 21:17

## 2017-02-15 RX ADMIN — OXYCODONE HYDROCHLORIDE 10 MG: 5 TABLET ORAL at 17:20

## 2017-02-15 RX ADMIN — OXYCODONE HYDROCHLORIDE 10 MG: 5 TABLET ORAL at 07:46

## 2017-02-15 RX ADMIN — SENNOSIDES AND DOCUSATE SODIUM 1 TABLET: 8.6; 5 TABLET ORAL at 07:46

## 2017-02-15 RX ADMIN — INSULIN ASPART 1 UNITS: 100 INJECTION, SOLUTION INTRAVENOUS; SUBCUTANEOUS at 07:42

## 2017-02-15 RX ADMIN — ATENOLOL 25 MG: 25 TABLET ORAL at 07:47

## 2017-02-15 RX ADMIN — PANTOPRAZOLE SODIUM 40 MG: 40 TABLET, DELAYED RELEASE ORAL at 07:46

## 2017-02-15 RX ADMIN — ATORVASTATIN CALCIUM 40 MG: 40 TABLET, FILM COATED ORAL at 07:46

## 2017-02-15 RX ADMIN — CYCLOBENZAPRINE HYDROCHLORIDE 5 MG: 5 TABLET, FILM COATED ORAL at 10:20

## 2017-02-15 RX ADMIN — ASPIRIN 325 MG ORAL TABLET 325 MG: 325 PILL ORAL at 07:46

## 2017-02-15 RX ADMIN — OXYCODONE HYDROCHLORIDE 10 MG: 5 TABLET ORAL at 02:54

## 2017-02-15 RX ADMIN — CEFTRIAXONE 2 G: 2 INJECTION, POWDER, FOR SOLUTION INTRAMUSCULAR; INTRAVENOUS at 13:10

## 2017-02-15 RX ADMIN — SODIUM CHLORIDE, PRESERVATIVE FREE 5 ML: 5 INJECTION INTRAVENOUS at 14:08

## 2017-02-15 RX ADMIN — ACETAMINOPHEN 650 MG: 325 TABLET, FILM COATED ORAL at 11:40

## 2017-02-15 RX ADMIN — INSULIN ASPART 1 UNITS: 100 INJECTION, SOLUTION INTRAVENOUS; SUBCUTANEOUS at 18:24

## 2017-02-15 ASSESSMENT — ACTIVITIES OF DAILY LIVING (ADL)
SWALLOWING: 0-->SWALLOWS FOODS/LIQUIDS WITHOUT DIFFICULTY
BATHING: 0-->INDEPENDENT
TRANSFERRING: 0-->INDEPENDENT
AMBULATION: 0-->INDEPENDENT
COGNITION: 1 - ATTENTION OR MEMORY DEFICITS
DRESS: 0-->INDEPENDENT
NUMBER_OF_TIMES_PATIENT_HAS_FALLEN_WITHIN_LAST_SIX_MONTHS: 4
RETIRED_EATING: 0-->INDEPENDENT
TOILETING: 0-->INDEPENDENT
RETIRED_COMMUNICATION: 0-->UNDERSTANDS/COMMUNICATES WITHOUT DIFFICULTY
WHICH_OF_THE_ABOVE_FUNCTIONAL_RISKS_HAD_A_RECENT_ONSET_OR_CHANGE?: AMBULATION;TRANSFERRING;TOILETING;BATHING;DRESSING;COGNITION;FALL HISTORY
FALL_HISTORY_WITHIN_LAST_SIX_MONTHS: YES

## 2017-02-15 ASSESSMENT — VISUAL ACUITY
OU: NORMAL ACUITY;GLASSES
OU: NORMAL ACUITY;GLASSES

## 2017-02-15 NOTE — PLAN OF CARE
Problem: Patient Care Overview (Adult)  Goal: Plan of Care Review  Patient is POD 3 T7-12 fusion. VSS. A&Ox4. Neuros intact other than generalized weakness, BLE=3/5. Occasionally confused, none overnight. Midline back incision is intact, old drainage marked. Hemovac removed this shift. Pain controlled with PRN Tylenol and oxycodone q4hr. Up with lift. Turn q2hr. CHO diet. Double lumen PICC @ 10cc/hr. Mepilex on sacrum for preventative measures. Will continue to monitor.

## 2017-02-15 NOTE — PLAN OF CARE
Problem: Goal Outcome Summary  Goal: Goal Outcome Summary  OT 6A: Patient seen for transfer to assist with transport to ARU. Patient mod A to sit EOB, min A to scoot increased use of BUE. Patient mod Ax2 squat pivot transfer with b/l knee buckling. OT answering questions regarding ARU. Recommend: ARU  Occupational Therapy Discharge Summary     Reason for therapy discharge:    Discharged to acute rehabilitation facility.     Progress towards therapy goal(s). See goals on Care Plan in [facility] electronic health record for goal details.  Goals partially met.  Barriers to achieving goals:   discharge from facility.     Therapy recommendation(s):    Continued therapy is recommended.  Rationale/Recommendations:  Increased independence in ADLs and IADLs within precautions.

## 2017-02-15 NOTE — PLAN OF CARE
Problem: Goal Outcome Summary  Goal: Goal Outcome Summary  PT/6A: Pt mod A x 2 for bed mobility utilizing logroll technique. Pt w/ decreased sitting balance, demo increased posterior lean requiring min A to maintain upright posture, VC/TC provided however minimal improvement noted. Pt performed 3x STS w/ Mod A x 2, cont to require B knee blocking. Tolerated standing ~15 seconds. Pt dependent sling to chair. Instructed in seated BLE strengthening exercises      Recommend DC to ARU to improve overall functional mobility as pt is below baseline level however is making good progress toward goals.

## 2017-02-15 NOTE — IP AVS SNAPSHOT
[facility]: [phone]                                              INTERAGENCY TRANSFER FORM - LAB / IMAGING / EKG / EMG RESULTS   2/15/2017                    Hospital Admission Date: 2/15/2017  PETER BUTLER   : 1940  Sex: Male        Attending Provider: Hyun Koehler MD     Allergies:  Blood Transfusion Related (Informational Only)    Infection:  None   Service:  ACUTE IP ANGELIQUE    Ht:  1.829 m (6')   Wt:  88.9 kg (196 lb)   Admission Wt:  90.3 kg (199 lb 1.6 oz)    BMI:  26.58 kg/m 2   BSA:  2.13 m 2            Patient PCP Information     Provider PCP Type    [facility]         Lab Results - 3 Days      Glucose by meter [[id number]] (Abnormal)  Resulted: 17 0735, Result status: Final result    Ordering provider: Hyun Koehler MD  17 0730 Resulting lab: POINT OF CARE TEST, GLUCOSE    Specimen Information    Type Source Collected On     17 0730          Components       Value Reference Range Flag Lab   Glucose 154 70 - 99 mg/dL H 170            Glucose by meter [[id number]] (Abnormal)  Resulted: 17 2135, Result status: Final result    Ordering provider: Hyun Koehler MD  17 210 Resulting lab: POINT OF CARE TEST, GLUCOSE    Specimen Information    Type Source Collected On     17          Components       Value Reference Range Flag Lab   Glucose 151 70 - 99 mg/dL H 170            Glucose by meter [[id number]] (Abnormal)  Resulted: 17 1720, Result status: Final result    Ordering provider: Hyun Koehler MD  17 1706 Resulting lab: POINT OF CARE TEST, GLUCOSE    Specimen Information    Type Source Collected On     17 1706          Components       Value Reference Range Flag Lab   Glucose 129 70 - 99 mg/dL H 170            Glucose by meter [[id number]] (Abnormal)  Resulted: 17 1230, Result status: Final result    Ordering provider: Hyun Koehler MD  17 1224 Resulting lab: POINT OF CARE TEST, GLUCOSE     Specimen Information    Type Source Collected On     03/18/17 1224          Components       Value Reference Range Flag Lab   Glucose 214 70 - 99 mg/dL H 170            Glucose by meter [[id number]] (Abnormal)  Resulted: 03/18/17 0720, Result status: Final result    Ordering provider: Hyun Koehler MD  03/18/17 0714 Resulting lab: POINT OF CARE TEST, GLUCOSE    Specimen Information    Type Source Collected On     03/18/17 0714          Components       Value Reference Range Flag Lab   Glucose 146 70 - 99 mg/dL H 170            Glucose by meter [[id number]] (Abnormal)  Resulted: 03/17/17 2115, Result status: Final result    Ordering provider: Hyun Koehler MD  03/17/17 2110 Resulting lab: POINT OF CARE TEST, GLUCOSE    Specimen Information    Type Source Collected On     03/17/17 2110          Components       Value Reference Range Flag Lab   Glucose 182 70 - 99 mg/dL H 170            Glucose by meter [[id number]] (Abnormal)  Resulted: 03/17/17 1735, Result status: Final result    Ordering provider: Hyun Koehler MD  03/17/17 1721 Resulting lab: POINT OF CARE TEST, GLUCOSE    Specimen Information    Type Source Collected On     03/17/17 1721          Components       Value Reference Range Flag Lab   Glucose 211 70 - 99 mg/dL H 170            Glucose by meter [[id number]] (Abnormal)  Resulted: 03/17/17 1155, Result status: Final result    Ordering provider: Hyun Koehler MD  03/17/17 1149 Resulting lab: POINT OF CARE TEST, GLUCOSE    Specimen Information    Type Source Collected On     03/17/17 1149          Components       Value Reference Range Flag Lab   Glucose 170 70 - 99 mg/dL H 170            Glucose by meter [[id number]] (Abnormal)  Resulted: 03/17/17 0745, Result status: Final result    Ordering provider: Hyun Koehler MD  03/17/17 0731 Resulting lab: POINT OF CARE TEST, GLUCOSE    Specimen Information    Type Source Collected On     03/17/17 0731          Components       Value Reference Range Flag Lab    Glucose 123 70 - 99 mg/dL H 170            Basic metabolic panel [[id number]] (Abnormal)  Resulted: 03/17/17 0633, Result status: Final result    Ordering provider: Osmin Lange MD  03/17/17 0000 Resulting lab: [facility]    Specimen Information    Type Source Collected On   Blood  03/17/17 0544          Components       Value Reference Range Flag Lab   Sodium 145 133 - 144 mmol/L H 13   Potassium 3.7 3.4 - 5.3 mmol/L  13   Chloride 111 94 - 109 mmol/L H 13   Carbon Dioxide 26 20 - 32 mmol/L  13   Anion Gap 8 3 - 14 mmol/L  13   Glucose 128 70 - 99 mg/dL H 13   Urea Nitrogen 16 7 - 30 mg/dL  13   Creatinine 0.57 0.66 - 1.25 mg/dL L 13   GFR Estimate -- >60 mL/min/1.7m2  13   Result:         >90  Non  GFR Calc     GFR Estimate If Black -- >60 mL/min/1.7m2  13   Result:         >90   GFR Calc     Calcium 8.6 8.5 - 10.1 mg/dL  13   Result:              Hepatic panel [[id number]] (Abnormal)  Resulted: 03/17/17 0633, Result status: Final result    Ordering provider: Juan Galindo MD  03/17/17 0000 Resulting lab: [facility]    Specimen Information    Type Source Collected On   Blood  03/17/17 0544          Components       Value Reference Range Flag Lab   Bilirubin Direct 0.1 0.0 - 0.2 mg/dL  13   Bilirubin Total 0.3 0.2 - 1.3 mg/dL  13   Albumin 2.2 3.4 - 5.0 g/dL L 13   Protein Total 5.5 6.8 - 8.8 g/dL L 13   Alkaline Phosphatase 140 40 - 150 U/L  13   ALT 25 0 - 70 U/L  13   AST 13 0 - 45 U/L  13            CBC with platelets [[id number]] (Abnormal)  Resulted: 03/17/17 0616, Result status: Final result    Ordering provider: Juan Galindo MD  03/17/17 0000 Resulting lab: [facility]    Specimen Information    Type Source Collected On   Blood  03/17/17 0544          Components       Value Reference Range Flag Lab   WBC 5.1 4.0 - 11.0 10e9/L  13   RBC Count 3.21 4.4 - 5.9 10e12/L L 13    Hemoglobin 8.6 13.3 - 17.7 g/dL L 13   Hematocrit 29.0 40.0 - 53.0 % L 13   MCV 90 78 - 100 fl  13   MCH 26.8 26.5 - 33.0 pg  13   MCHC 29.7 31.5 - 36.5 g/dL L 13   RDW 16.7 10.0 - 15.0 % H 13   Platelet Count 180 150 - 450 10e9/L  13            Glucose by meter [[id number]] (Abnormal)  Resulted: 03/16/17 2120, Result status: Final result    Ordering provider: Hyun Koehler MD  03/16/17 2113 Resulting lab: POINT OF CARE TEST, GLUCOSE    Specimen Information    Type Source Collected On     03/16/17 2113          Components       Value Reference Range Flag Lab   Glucose 142 70 - 99 mg/dL H 170            Glucose by meter [[id number]] (Abnormal)  Resulted: 03/16/17 1711, Result status: Final result    Ordering provider: Hyun Koehler MD  03/16/17 1706 Resulting lab: POINT OF CARE TEST, GLUCOSE    Specimen Information    Type Source Collected On     03/16/17 1706          Components       Value Reference Range Flag Lab   Glucose 116 70 - 99 mg/dL H 170            Glucose by meter [[id number]] (Abnormal)  Resulted: 03/16/17 1210, Result status: Final result    Ordering provider: Hyun Koehler MD  03/16/17 1204 Resulting lab: POINT OF CARE TEST, GLUCOSE    Specimen Information    Type Source Collected On     03/16/17 1204          Components       Value Reference Range Flag Lab   Glucose 194 70 - 99 mg/dL H 170            Glucose by meter [[id number]] (Abnormal)  Resulted: 03/16/17 0720, Result status: Final result    Ordering provider: Hyun Koehler MD  03/16/17 0716 Resulting lab: POINT OF CARE TEST, GLUCOSE    Specimen Information    Type Source Collected On     03/16/17 0716          Components       Value Reference Range Flag Lab   Glucose 144 70 - 99 mg/dL H 170            Potassium [[id number]]  Resulted: 03/16/17 0552, Result status: Final result    Ordering provider: Osmin Lange MD  03/16/17 0001 Resulting lab: [facility]    Specimen Information    Type Source Collected On  "  Blood  03/16/17 0532          Components       Value Reference Range Flag Lab   Potassium 3.5 3.4 - 5.3 mmol/L  13            Testing Performed By     Lab - Abbreviation Name Director Address Valid Date Range    13 - Unknown [facility] Unknown [address]  [facility] [address] 01/15/15 0916 - Present    170 - Unknown POINT OF CARE TEST, GLUCOSE Unknown Unknown 10/31/11 1114 - Present            Unresulted Labs (24h ago through future)    Start       Ordered    03/10/17 0600  Hepatic panel  EVERY M F,   Routine      03/09/17 0820    03/10/17 0600  CBC with platelets  EVERY M F,   Routine     Comments:  Last Lab Result: Hemoglobin (g/dL)       Date                     Value                 03/06/2017               9.0 (L)          ----------    03/09/17 1537    03/06/17 0600  CRP inflammation  EVERY MONDAY,   Routine      03/05/17 0611    02/27/17 0600  Basic metabolic panel  EVERY M F,   Routine      02/24/17 2311    Unscheduled  Potassium  (Potassium Replacement - \"High\" - Replacement for all levels less than 4.1 mmol/L - UU,UR,UA,RH,SH,PH,WY )  CONDITIONAL (SPECIFY),   Routine     Comments:  Obtain Potassium Level for these conditions:  *IF no potassium result within 24 hrs before initiation of order set, draw potassium level with next lab collect.    *2 HOURS AFTER last IV potassium replacement dose and 4 hours after an oral replacement dose when potassium replacement given for level less than 3.4.  *Next morning after potassium dose.     Repeat Potassium Replacement if necessary.    03/12/17 2309    Unscheduled  Magnesium  (Magnesium Replacement - Adult - \"High\" - Replacement for all levels less than or equal to 2 mg/dL)  CONDITIONAL (SPECIFY),   Routine     Comments:  Obtain Magnesium Level for these conditions:  *IF no magnesium result within 24 hrs before initiation of order set, draw magnesium level with next lab collect.    *2 HOURS AFTER last magnesium replacement " "dose when magnesium replacement given for level less than 1.6  *Next morning after magnesium dose.     Repeat Magnesium Replacement if necessary.    17 2309    Unscheduled  Magnesium  (Magnesium Replacement -  Adult - \"Standard\" - Replacement for all levels less than 1.6 mg/dL )  CONDITIONAL (SPECIFY),   Routine     Comments:  Obtain Magnesium Level for these conditions:  *IF no magnesium result within 24 hrs before initiation of order set, draw magnesium level with next lab collect.    *2 HOURS AFTER last magnesium replacement dose when magnesium replacement given for level less than 1.6   *Next morning after magnesium dose.     Repeat Magnesium Replacement if necessary.    17 0832         ECG/EMG Results      Echo limited with definity [[id number]]  Resulted: 17 1224, Result status: Edited Result - FINAL    Ordering provider: Osmin Lange MD  17 1043 Resulted by: Clarisse Valdes MD    Performed: 17 1234 - 17 1355 Resulting lab: RADIOLOGY RESULTS    Narrative:       [id number]  ECH  [id number]  [id number]^LUIGI^OSMIN^           [facility],[place]  Echocardiography Laboratory  [address] [place], [place] [address]     Name: PETER BUTLER  MRN: [id number]  : 1940  Study Date: 2017 12:24 PM  Age: 76 yrs  Gender: Male  Patient Location: [facility]  Reason For Study: Arrhythmia  Ordering Physician: OSMIN LANGE  Performed By: DO [place]     BSA: 2.1 m2  Height: 72 in  Weight: 190 lb  BP: 128/64 mmHg  _____________________________________________________________________________  __        Procedure  Complete Portable Echo Adult. Contrast Optison. Technically difficult study.  Optison (NDC #9008-3423-99) given intravenously. Patient was given 6 ml  mixture of 3 ml Optison and 6 ml saline. 3 ml wasted. IV start location R  Upper arm . The patient's rhythm is atrial " fibrillation.  _____________________________________________________________________________  __        Interpretation Summary  Technically difficult study.The patient's rhythm is atrial fibrillation.  The left ventricle is severely dilated. Left ventricular systolic function is  severely reduced, ejection fraction is estimated at 25-30%. There is severe  global hypokinesis.  Right ventricular systolic function is mildly reduced.  The right ventricular systolic pressure is approximated at 15 mmHg plus the  right atrial pressure. IVC is plethoric and estimated mean RA pressure is 15  mmHg.  No pericardial effusion present.  _____________________________________________________________________________  __     _____________________________________________________________________________  __     MMode/2D Measurements & Calculations     EF(MOD-bp): 30.0 %        Doppler Measurements & Calculations  TR max alessia: 190.0 cm/sec  TR max P.4 mmHg           _____________________________________________________________________________  __           Report approved by: Edward Snyder 2017 05:09 PM       1    Type Source Collected On     17 1224          View Image (below)        Echocardiogram Limited [[id number]]  Resulted: 17 1235, Result status: In process    Ordering provider: Osmin Lange MD  17 1043 Performed: 17 1234 - 17 1234    Resulting lab: [facility]                   Encounter-Level Documents:     There are no encounter-level documents.      Order-Level Documents:     There are no order-level documents.

## 2017-02-15 NOTE — IP AVS SNAPSHOT
"` `           [facility]: [id number]                 INTERAGENCY TRANSFER FORM - NOTES (H&P, Discharge Summary, Consults, Procedures, Therapies)   2/15/2017                    Hospital Admission Date: 2/15/2017  TAD MANNING   : 1940  Sex: Male        Patient PCP Information     Provider PCP Type    [facility]         History & Physicals      H&P by Hyun Koehler MD at 2/15/2017  6:48 PM     Author:  Hyun Koehler MD Service:  Physical Medicine and Rehabilitation Author Type:  Physician    Filed:  2017  5:30 PM Date of Service:  2/15/2017  6:48 PM Note Created:  2/15/2017  6:48 PM    Status:  Addendum :  Hyun Koehler MD (Physician)         Post Admission Physician Evaluation:    I have compared[PS1.1] Tad Manning[PS1.2]'s condition on admission to acute rehabilitation to that outlined in the preadmission screen. History and physical exam performed by me.[PS1.1] Rehab diagnosis was mentioned as \"tramatic spinal cord dysfunction\" s/p fall, but his spinal cord injury is secondary to epidural abscess and he fell due to weakness in his bilateral lower extremities se[PS1.3]condary to SCI. 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    Comorbid medical conditions being managed:[PS1.1] post-op pain, anemia, hyponatremia, and h/o bacteremia on IV antibiotic. PMH significant for DM with peripheral neuropathy, HTN, HLD, CAD s/p CABG, systolic and diastolic heart failure, and CLL in remission.[PS1.2]     Prior functional level:[PS1.1] I with all aspects of his life prior to 2016; has had gradual functional decline since then with fatigue, weakness in bilateral lower extremities and frequent " falls[PS1.2]     Present function:[PS1.1] mod A of 2 for mobility and ADLs[PS1.2]    Anticipated rehabilitation course:[PS1.1] he will be most likely mod I with mobility and ADLs at WC base level upon discharge; might be able to ambulate for short distances with assistive device and assistance but probably not functional. Anticipate longer course of rehab for ambulation, limited by diabetic neuropathy and sensory loss.[PS1.2]     Will benefit from intensive rehabilitation includin minutes each of PT and OT[PS1.1] - consider SLP consult pending OT cognitive eval[PS1.2]   Rehabilitation nursing  Close management by physiatry    Prognosis:[PS1.1] medical prognosis is good and he has been stable since his surgery on current regimen of antibiotic. Rehab prognosis is also reasonable given some improvement of his symptoms post-op.[PS1.2]    Estimated length of stay:[PS1.1] 3-4 weeks.[PS1.2]     Hyun Koehler MD  Physical Medicine & Rehabilitation[PS1.1]       Revision History        User Key Date/Time User Provider Type Action    > [N/A] 2017  5:30 PM Hyun Koehler MD Physician Addend     PS1.3 2017  5:28 PM Hyun Koehler MD Physician Incomplete Revision     PS1.2 2017  1:22 AM Hyun Koehler MD Physician Sign     PS1.1 2/15/2017  6:48 PM Hyun Koehler MD Physician             H&P by Hyun Koehler MD at 2/15/2017  3:00 PM     Author:  Hyun Koehler MD Service:  Physical Medicine and Rehabilitation Author Type:  Physician    Filed:  2017  5:28 PM Date of Service:  2/15/2017  3:00 PM Note Created:  2/15/2017 12:32 PM    Status:  Addendum :  Hyun Koehler MD (Physician)             [facility]   Acute Rehabilitation Unit  Admission History and Physical    chief complaint[PS1.1]   Weakness of bilateral lower extremities[PS1.2]    History of Present illness[PS1.1]  Tad Manning is a 76 year old right hand dominant male with history of DM  with neuropathy, CAD, PVD with bilateral LE stents and CLL in remission who is transferred from [facility] following surgical intervention for epidural abscess and infectious spondylitis. He was originally seen at [facility] on 2/7 following a fall at his home. This was the most recent in a series of increasingly frequent falls that were associated with bilateral leg weakness and lower back pain. At [facility], MRI showed infectious spondylitis at T9-10 with paraspinal abscess, retrolisthesis, and epidural inflammation resulting in severe canal stenosis with spinal cord impingement. On 2/9 the patient was transferred to the [facility] for urgent surgical decompression of the spinal cord. Immediately prior to the operation, the patient was unable to move his right LE and had minimal movement of his left LE. On admission to acute rehab, the patient has some improvement in symptoms as he is able to move both of his legs but still has significant weakness below his baseline. Reports pain around the incision and surgery site which is well managed with pain meds.     He was seen and followed by ID team during his hospital stay; current on IV rocephin via right PICC line with the plan to resume the same regimen for total of 6 weeks till 3/24. He had TLSO initially after surgery which was discontinued; ok to use for comfort if needed.[PS1.2]     During his acute hospitalization, patient was seen and evaluated by PT,[PS1.1] and OT[PS1.2].  All specialties collectively recommended that patient would benefit from ongoing therapies in the acute inpatient rehabilitation setting. In review of the therapy notes,     PT: Pt mod A x 2 for bed mobility utilizing logroll technique. Pt w/ decreased sitting balance, demo increased posterior lean requiring min A to maintain upright posture, VC/TC provided however minimal improvement noted. Pt performed 3x STS w/ Mod A x 2, cont to require B knee  blocking. Tolerated standing ~15 seconds. Pt dependent sling to chair. Instructed in seated BLE strengthening exercises     OT[PS1.1]:[PS1.2] Pt unable to recall PT education on surgical precautions; writer provided additional education on spinal precautions, impact on ADLs/activity, and compensatory strategies for tasks. Pt also issued handout for reinforcement of learning. Pt able to transfer up to EOB with mod A and VCs; pt able to tolerate sitting EOB ~10 min with CGA-min A to complete ADLs.      Currently, the patient is medically appropriate and is assessed to have needs and will benefit from an inpatient acute rehabilitation comprehensive program working with[PS1.1] Pt and OT[PS1.2], and will also benefit from supervision and management of Rehab Nursing and Rehab MD.       PAST Medical History  Past Medical History   Diagnosis Date     Arthritis      ASCVD (arteriosclerotic cardiovascular disease)      BMI 30.0-30.9,adult      BPH (benign prostatic hypertrophy)      Cellulitis      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)      Coronary artery disease      triple bypass 1995     Diabetes mellitus (H)      Diabetic polyneuropathy (H)      History of blood transfusion      Hyperlipidaemia      Hypertension      Malignant neoplasm (H)      CLL     Noninflammatory pericardial effusion      Osteomyelitis of left foot (H)      PVD (peripheral vascular disease) (H)      Sebaceous cyst        Surgical History  Past Surgical History   Procedure Laterality Date     Tonsillectomy       Bone marrow biopsy, bone specimen, needle/trocar  10/19/2012     Procedure: BIOPSY BONE MARROW;  BONE MARROW BIOPSY  (CONSCIOUS SED);  Surgeon: Ramon Quesada MD;  Location:  GI     Vascular surgery       ptcr legs     Bypass graft femoropopliteal  1/10/2014     Procedure: BYPASS GRAFT FEMOROPOPLITEAL;  Left above knee popliteal to  Below knee popliteal bypass using transverse saphenous vein  graft. Left 2nd Toe amputation;  Surgeon: Amador Vick MD;  Location: [facility] OR     Amputate toe(s)  1/10/2014     Procedure: AMPUTATE TOE(S);;  Surgeon: Amador Vick MD;  Location: [facility] OR     Graft vein from extremity (location)  1/10/2014     Procedure: GRAFT VEIN FROM EXTREMITY (LOCATION);;  Surgeon: Amador Vick MD;  Location: [facility] OR     Cardiac surgery       angioplasty with stent, triple bypass     Excise cyst generic (location) N/A 2/1/2016     Procedure: EXCISE CYST GENERIC (LOCATION);  Surgeon: Amador Vick MD;  Location: [facility]     Laminectomy thoracic one level N/A 2/8/2017     Procedure: LAMINECTOMY THORACIC ONE LEVEL;  Surgeon: Marcelo Trent MD;  Location: [facility] OR     Optical tracking system fusion posterior spine thoracic three+ levels N/A 2/12/2017     Procedure: OPTICAL TRACKING SYSTEM FUSION POSTERIOR SPINE THORACIC THREE+ LEVELS;  Surgeon: Marcelo Trent MD;  Location: [facility] OR       SOCIAL HISTORY  Marital Status: single   Living situation:[PS1.1] lives[PS1.2] with his SO[PS1.1] in an apartment with a small threshold to enter the building[PS1.2]   Tobacco use: 2 ppd for 30 years; quit 1995  Alcohol use: occasional       FAMILY HISTORY  Family History   Problem Relation Age of Onset     CANCER Mother      leukemia         Prior Functional history   Pt was independent with all ADLs/IADLs, transfers, mobility and gait.[PS1.1] Right hand-dominant; does not drive.[PS1.2]     Medications  Scheduled meds  Prescriptions Prior to Admission   Medication Sig Dispense Refill Last Dose     insulin aspart (NOVOLOG PEN) 100 UNIT/ML injection Inject 1-6 Units Subcutaneous every 4 hours        [DISCONTINUED] insulin glargine (LANTUS) 100 UNIT/ML injection Inject 5 Units Subcutaneous At Bedtime        lidocaine (LIDODERM) 5 % Patch Place 1 patch onto the skin daily as needed for moderate pain to back for back pain        atenolol (TENORMIN) 25 MG tablet Take 1 tablet (25 mg) by mouth  daily 30 tablet 0 2/6/2017 at Unknown time     polyethylene glycol (MIRALAX/GLYCOLAX) Packet Take 17 g by mouth daily as needed for constipation 7 packet 0  at PRN     senna-docusate (SENOKOT-S;PERICOLACE) 8.6-50 MG per tablet Take 1-2 tablets by mouth 2 times daily as needed (constipation ) 100 tablet 0  at PRN     bacitracin-polymyxin b (POLYSPORIN) ointment Apply topically 2 times daily 15 g 0 Past Week at Unknown time     order for DME Equipment being ordered: DH2 shoe 1 Device 0 Taking     tamsulosin (FLOMAX) 0.4 MG 24 hr capsule Take 0.8 mg by mouth At Bedtime    2/6/2017 at [facility]     ZOLPIDEM TARTRATE PO Take 5 mg by mouth nightly as needed    Past Month at Unknown time     Cholecalciferol (VITAMIN D PO) Take 1,000 Units by mouth daily.    2/6/2017 at Unknown time     atorvastatin (LIPITOR) 40 MG tablet Take 1 tablet by mouth daily. 30 tablet 1 2/6/2017 at Unknown time     Ascorbic Acid (VITAMIN C PO) Take 500 mg by mouth daily.   2/6/2017 at Unknown time     Nitroglycerin (NITROQUICK SL) Place 0.4 mg under the tongue as needed.    at PRN       ALLERGIES     Allergies   Allergen Reactions     Blood Transfusion Related (Informational Only) Other (See Comments)     Patient has a history of a clinically significant antibody against RBC antigens.  A delay in compatible RBCs may occur.          Review of Systems  A 10 point ROS was performed and negative unless otherwise noted in HPI.       Physical Exam  VITAL SIGNS:  There were no vitals taken for this visit.  BMI:  Estimated body mass index is 24.28 kg/(m^2) as calculated from the following:    Height as of 2/7/17: 1.829 m (6').    Weight as of 2/7/17: 81.2 kg (179 lb).     General: NAD, pleasant and cooperative[PS1.1] - pale[PS1.2]   HEENT: oropharynx clear with MMM, EOMI, PERRL    Pulmonary: clear[PS1.1] but diminished[PS1.2] breath sounds b/l  Cardiovascular: RRR, no murmur   Abdominal: soft, non-tender, non-distended   Extremities: warm, well perfused, no  edema in bilateral lower extremities, no tenderness in calves[PS1.1] - right PICC line intact[PS1.2]   MSK/neuro:   Mental Status:  alert and oriented x3    Cranial Nerves: grossly normal    Sensory:[PS1.1] absent[PS1.2] to light touch in[PS1.1] toes and plantar aspects of feet b/l; also diminished to LT at finger tips b/l[PS1.2]    Strength:[PS1.1] 4+/5 in bilateral upper extremities[PS1.2]     HF  KE  DF  EHL  PF   R[PS1.1]  1/5 2/5 3/5 3/5 4/5[PS1.2]  L[PS1.1]  1/5 3/5 3/5 3/5 4/5[PS1.2]    Reflexes:[PS1.1] 1+ at patellar and absent at Achilles b/l[PS1.2]        Labs  Pertinent labs     Lab Results   Component Value Date    WBC 9.9 02/13/2017    HGB 8.2 (L) 02/13/2017    HCT 26.0 (L) 02/13/2017    MCV 88 02/13/2017     02/13/2017     Lab Results   Component Value Date     (L) 02/13/2017    POTASSIUM 3.9 02/13/2017    CHLORIDE 99 02/13/2017    CO2 24 02/13/2017     (H) 02/13/2017     Lab Results   Component Value Date    AST 11 01/21/2017    ALT 23 01/21/2017    ALKPHOS 101 01/21/2017    BILITOTAL 0.7 01/21/2017         ASSESSMENT/PLAN:  Tad Manning is a 76 year old[PS1.1] right[PS1.2] hand dominant male[PS1.1] with 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 Medical co-morbidities include post-op pain, anemia, hyponatremia, and h/o bacteremia on IV antibiotic. PMH significant for DM with peripheral neuropathy, HTN, HLD, CAD s/p CABG, systolic and diastolic heart failure, and CLL in remission.     Functional deficits are weakness in bilateral lower extremities, sensory loss in feet (due to diabetic neuropathy but worse likely due to SCI), neurogenic bladder and bowel, and acute on chronic deconditioning with poor endurance.[PS1.2]        Admission to acute inpatient rehab[PS1.1] 2/15[PS1.2].        1. PT,[PS1.1] and OT 90[PS1.2] minutes of each on a daily basis, in addition to rehab nursing and  close management of physiatrist.      2. Impairment of ADL's: OT for[PS1.1] 90[PS1.2] min daily to work on self care, dressing, toileting, bathing, energy conservation techniques with use of ADs as needed.     Impairment of mobility:  PT for[PS1.1] 90[PS1.2] min daily to work on strengthening, endurance buildup, transfers with use of[PS1.1] assistive device; will likely require a WC at discharge pending his progress.     ** consider SLP eval given some confusion in the acute hospital; await OT cognitive eval.[PS1.2]     3. Rehab RN to administer medication, patient education on medication taking, VS monitoring, bowel regimen, glucose monitoring and wound care/surgical wound dressing changes and monitoring.     1. Medical Conditions[PS1.1]; appreciate hospitalist team assistance in medical management.     # Incomplete SCI:  # Thoracic osteomyelitis/discitis with epidural abscess:  # S/p T9-10 laminectomy, T7-12 posterior arthrodesis and TF right sided T9-10 fusion.   # Strep mitis bacteremia:     --TLSO for comfort  --wound care per recs; will remove sutures at 2 weeks higinio ~ 2/26  --PICC line care on the right arm   --pain management with lidoderm patch, prn tylenol, oxycodone and flexeril - will adjust as needed   --continue IV rocephin for total of 6 weeks - end date 3/24  --should f/u with ID and neurosurgery teams as outpatient      ** Check CBC, CRP, creatinine, and LFTs weekly while on therapy and have results faxed to the [facility] at [phone].     Hyponatremia: since 2/12 and stable ~ 131. 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 Will repeat labs in am.     Anemia: likely due to acute illness and blood loss; stable since his surgery. Will monitor clinically and repeat labs as needed.     # DM type II with neuropathy: on glipizide and victoza injections prior to admission. HgbA1C was 6.1% on 2/10. Currently on lantus 12 units daily and SSI.     # PAD s/p bilateral  iliac artery stents (2013) and LLE bypass (2014):  Also had amputation of his left 2nd toe. Continue ASA and will restart plavix on 2/20.      # HTN: on amlodipine, atenolol, lisinopril and prinzide (?) prior to admission. Currently on atenolol 25 daily; BP has been in good range.       # HLD: on lipitor 40 daily. LDL was 48 in 5/2014 when last checked.       # CAD s/p CABG in 1995 with HFrEF (EF 40-45%): no active issues - continue ASA, lipitor and atenolol. Low threshold to repeat echo if any clinical s/s of heart failure.     # H/o CLL in remission: last chemo in 2015; is followed by oncology team - no active issues.[PS1.2]     2. Adjustment to disability:  Clinical psychology to eval and treat[PS1.1]; will discuss with pt.[PS1.2]   3. FEN:[PS1.1] regular, mod consistent CHO.[PS1.2]   4. Bowel:[PS1.1] ? Neurogenic bowel - on prn bowel meds upon admission with last BM on 2/14. Will monitor and adjust meds/bowel regimen as needed.[PS1.2]   5. Bladder:[PS1.1] ? Neurogenic bladder - h/o BPH on flomax prior to admission; was not resumed after admission to the hospital likely due to low BP. Will check PVRs.[PS1.2]   6. DVT Prophylaxis:[PS1.1] mechanical; per neurosurgery team notes Ok to start chemical prophylaxis on 2/16 - will clarify.[PS1.2]   7. GI Prophylaxis:[PS1.1] none, oral intake.[PS1.2]   8. Code:[PS1.1] full.[PS1.2]   9. Disposition:[PS1.1] home vs TCU pending progress and family support.[PS1.2]   10. ELOS:[PS1.1]  3-4 weeks.[PS1.2]   11. Follow up Appointments on Discharge:[PS1.1] ID, neurosurgery, PCP and PM&R.   Follow-up with neurosurgery, Dr. Marcelo Trent MD in 2 weeks for wound check.      Follow-up with Infectious Disease, Dr. Gillian Vila, on 3/23/17. If you have not heard back regarding your appointment time please call [phone] and ask to page the Infectious Disease fellow regarding your pre-planned appointment with Dr. Vila on 3/23/17 to assess antibiotic regimen and  progress[PS1.2]    Hyun Koehler MD  Physical Medicine & Rehabilitation[PS1.1]    80[PS1.2] minutes spent with admission, more than 50% in direct patient interaction and education, the remainder in coordination of care.  [PS1.1]         Revision History        User Key Date/Time User Provider Type Action    > [N/A] 2/16/2017  5:28 PM Hyun Koehler MD Physician Addend     PS1.2 2/16/2017  1:13 AM Hyun Koehler MD Physician Sign     PS1.1 2/15/2017 12:32 PM Hyun Koehler MD Physician                      Discharge Summaries      Discharge Summaries signed by Dale Leroy MD at 3/19/2017  9:09 AM      Author:  Dale Leroy MD Service:  Physical Medicine and Rehabilitation Author Type:  Physician    Filed:  3/19/2017  9:09 AM Date of Service:  3/18/2017 10:46 PM Note Created:  3/19/2017  8:54 AM    Status:  Signed :  Dale Leroy MD (Physician)         DATE OF ADMISSION:  02/15/2017       DATE OF DISCHARGE:  03/19/2017      DISCHARGE DIAGNOSES:   1.  Nontraumatic spinal cord injury with paraparesis.   2.  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   3.  Impaired mobility and activities of daily living related to above.   4.  Mild impaired cognition, unclear etiology.   5.  Type 2 diabetes mellitus.   6.  Acute systolic congestive heart failure with severe global hypokinesis.   7.  Atrial flutter with variable block and tachycardia.   8.  Coronary artery disease with past stenting and coronary artery bypass grafting.   9.  Peripheral vascular disease with historical stents and femoral popliteal bypass.   10.  Hypertension.   11.  Hyponatremia, subsequent hypernatremia.   12.  History of chronic lymphocytic leukemia in remission.   13.  Anemia of chronic disease.   14.  Sacral moisture-associated dermatitis, improving.   15.  Friction wounds, bilateral  buttocks, left side resolved, right side nearly resolved.   16.  Adjustment disorder with anxiety and insomnia.      HISTORY OF PRESENT ILLNESS:  Tad Vega is a 76-year-old with history of considerable coronary artery and peripheral artery disease, diabetes and hypertension who presented initially to [facility] 01/20 with lower extremity weakness and syncopal episodes felt to be linked with orthostatic hypotension and acute renal failure.  Discharged home on the 25th, presented back 02/07 and he was identified with thoracic diskitis and paraspinal abscess with spinal cord impingement.  He transferred to [facility] 02/09 for further workup and management.  Tad underwent surgical decompression and fusion, procedure as above on 02/12.  Before surgery, he had almost complete leg paralysis.  Post-surgically, he started to have some functional movement return.  He was started on broad-spectrum antibiotics.  Cultures subsequently grew moderate Streptococcus mitis with various anaerobic species as well.  Antibiotics narrowed to intravenous ceftriaxone and a PICC line was placed.  After stabilization, he transferred to the inpatient rehab center, [facility] on 02/15 for comprehensive cares.      REHABILITATION COURSE:   1.  Tad has participated in physical and occupational therapy as well as rehab nursing and close management by Physiatry.  Also consultation with Hospitalist Service, Cardiology and Health Psychology.  Tad has made some improvements in strength in his lower extremities but not sufficient enough yet to discharge home.  He is therefore discharging to a transitional care unit for additional therapy and recovery time.   2.  From a mobility standpoint, Tad has improved strength.  Initially on admission to acute rehabilitation, hip flexion 1's with more distally strength 2 to 3's.  Currently, hip flexion 3, more distally, knees,  ankles solid 4's.  This, however, is not stable enough for standing without assistance with knee blocking.  He is working with slide board transfers, though not fully independent with that yet.  He is predominantly wheelchair based mobility and with this he is able to push roughly 250 feet on level surfaces.  Also significantly limiting Tad in function is cardiac heart failure with reduced endurance.  During the acute rehabilitation there was concern of worsening cardiac function with new atrial flutter.  Seen by Cardiology and echocardiogram did identify some global hypokinesis.  His heart rates with this new atrial flutter had variable block, sometimes tachycardia, roughly 130s.  He was recommended to manage medically with beta blockade.  Given this along with a CHADS-VASc score of 4, I would traditionally recommend longer term stronger anticoagulation with warfarin.  However, given some of his fall risk and in turn bleed risk he was continued on aspirin and Plavix only.  Diuresis has been adjusted down with some worsened lower pressures and tachycardia on higher dose. Presently 40 mg daily. He is recommended to follow up with Cardiology, currently outlined 05/01 with Dr. Valdes though could be sooner if symptoms worsen.   3.  For infectious disease, Tad continues on IV ceftriaxone, this is outlined to continue until 03/23, follow up with Infectious Disease, Dr. Max. Exploring if this appointment is mandatory or if decision can be made with labs and our reported clinical exam. He has remained afebrile with normal white count.  Tracking weekly labs, there is an overall decrease in his CRP from 128; however, it had a mio around 20 and has subsequently gone up a bit to 81.  Without other clinical signs of infection, we are maintaining the current antibiotic regimen.   4.  Diabetes was managed during his stay and adjustments to his insulin.  He has been needing very little carb coverage, short-acting  aspart so will go with long-acting glargine only.  He also has oral metformin.   5.  Tad has had some redness on his sacrum and buttocks.  Some of this is felt to be moisture related and improved.  There is also some friction redness and flaking of the skin, left buttock resolved, right buttock improved, still covered with Mepilex dressing every other day.   6.  Psychosocially Tad has had 1-month stay on acute rehab and has shown some above outlined progress; however, it is looking as if he will need wheelchair base for much of his primary mobility for some time.  Did do a home safety assessment and there are several physical barriers including carpet, narrow access into the bathroom and showers.  He does have a significant other, but not consistently available to provide the type of support, especially with personal cares.  Tad may do very well at an assisted living facility and that may be eventual need.  He is really hoping to build on at least some short distance ambulation which would facilitate return to his prior living situation.      DISCHARGE MEDICATIONS:   1.  Clopidogrel 75 mg daily.   2.  Furosemide 40 mg daily.   3.  Lisinopril 10 mg daily.   4.  Melatonin 3 mg at bedtime.   5.  Metformin 500 mg twice daily.   6.  Metoprolol 75 mg twice daily.   7.  Mirtazapine 15 mg at bedtime.   8.  Potassium chloride sustained action 20 mEq daily.   9.  Ranitidine 150 mg twice daily.   10.  Insulin glargine 22 units subcutaneous every morning.   11.  Acetaminophen 650 mg every 4 hours as needed for pain.   12.  Aspirin 325 mg daily.   13.  Atorvastatin 40 mg daily.   14.  Ceftriaxone 2 g intravenous q.12 h.  Continue at least through infectious disease, followup on 03/23.   15.  Nitroglycerin sublingual tablet 0.4 mg under the tongue as needed per protocol for angina.   16.  Polyethylene glycol 17 g daily as needed for constipation.   17.  Senna docusate 1-2 tablets twice daily as needed for  constipation.   18.  Tamsulosin 0.8 mg at bedtime for benign prostatic hypertrophy.   19.  Vitamin C 500 mg daily for supplementation.   20.  Vitamin D 1000 units daily for supplementation.      DISCHARGE ORDERS AND RECOMMENDATIONS:  Diet is diabetic, moderate consistent carbohydrate.  Regular consistency with thin liquids.  Have been offering Ensure Clear or Ensure Plus with meals to augment nutrition.  Recommend daily weights, tracking shift of more than 2 pounds per day or 5 pounds in a week, to primary provider.  Encourage oral hydration.  Blood glucose checks twice daily.  Nursing facility standing orders and Mantoux per policy.  Wound care back incision has healed well.  Monitor for signs of infection.  Peritoneum, apply Mahsa Cleanse with any incontinent, then apply a thin layer of Critic-Aid paste around rectum, though not onto buttocks.  For buttocks, apply Sween 24 or equivalent moisturizing lotion to dry skin.  Right ischial tuberosity area friction sore, clean every other day with Mahsa Cleanse then cover with Mepilex dressing.  Also daily apply lotion to calluses and dry skin on feet.  Should continue with physical and occupational therapies.  Activity, avoid bending and twisting through thoracic spine.  No longer has weight lifting restrictions.  Transfers with assistance only.      FOLLOWUP APPOINTMENTS:  With facility based providers for medical issues while there and follow up with primary provider, Dr. Gene Guevara 1-2 weeks after TCU discharge.  Follow up with Infectious Disease specialist, Dr. Gillian Max 03/23 at 8:00 a.m., Exploring if this appointment is mandatory or if decision can be made with labs and our reported clinical exam. with Cardiology, Dr. Clarisse Valdes 05/01 at 11:00 a.m. Neurosurgery Dr. Francisco J Trent, f/u surgery in 3-4 weeks. Can see Dr. Hyun Koehler in Rehabilitation Medicine [facility] after TCU discharge as needed if rehabilitation needs persist.      Greater than 30 minutes spent  with discharge.         DALE MCKEON MD             D: 2017 22:46   T: 2017 08:54   MT: katrin      Name:     PETER BUTLER   MRN:      [id number]        Account:        [id number]   :      1940           Admit Date:     [id number]                                  Discharge Date:       Document: [id number]       cc: Gillian Valdes MD[SC1.1]        Revision History        User Key Date/Time User Provider Type Action    > SC1.1 3/19/2017  9:09 AM Dale Mckeon MD Physician Sign     [N/A] 3/19/2017  8:54 AM Dale Mckeon MD Physician Edit                     Consult Notes      Consults by Faith Suarez RN at 3/3/2017  4:00 PM     Author:  Faith Suarez RN Service:  Patient Learning Author Type:  Registered Nurse    Filed:  3/3/2017  4:00 PM Date of Service:  3/3/2017  4:00 PM Note Created:  3/3/2017  3:55 PM    Status:  Signed :  Faith Suarez RN (Registered Nurse)     Consult Orders:    1. Patient Learning Center IP Consult [[id number]] ordered by Hyun Koehler MD at 17 1555                Pt.and SO attended class for PICC cares and IV antibiotics. Pt.stated that he will be in the hospital for some time before discharge and isn't aware of home care at this time. He was taught the IV push method for the Rocephin and was able to do this with some reminders during the process. He was encouraged to use the written material for administering the medication so that he would no miss vital steps in the process and increase risk for infection. His SO was at the class but stated that Peter would be responsible for the antibiotic Pt.and SO were encouraged to call and take a review class if he was hospitalized for a period of time to make the teaching more current. They were given all the written information for the class.[JT1.1]     Revision History        User Key Date/Time User Provider Type Action     > JT1.1 3/3/2017  4:00 PM Faith Suarez RN Registered Nurse Sign            Consults signed by Melissa Rodriguez, PhD LP at 2/27/2017  4:25 PM      Author:  Melissa Rodriguez, PhD LP Service:  Health Psychology Author Type:  Psychologist    Filed:  2/27/2017  4:25 PM Date of Service:  2/24/2017  5:00 PM Note Created:  2/24/2017  6:44 PM    Status:  Signed :  Melissa Rodriguez, PhD LP (Psychologist)     Consult Orders:    1. Psychology Adult IP Consult [[id number]] ordered by Hyun Koehler MD at 02/23/17 9280                  Health Psychology                  Clinic    Department of Medicine  Melissa Rodriguez, Ph.D., L.P. [phone]                          Clinics and Surgery Center  [facility] Анна Parikh, Ph.D.,  L.P. [phone]                 [facility] Mail Code 741   Martin Ramos, Ph.D., A.B.P.P., L.P. [phone]     [address] [place] [address] Michelle Sahu, Ph.D., L.P. [phone]            [place] [address]           Lydia Bonilla, Ph.D., L.P. [phone]     Inpatient Health Psychology Consultation*     DATE OF SERVICE:  02/24/2017      REFERRAL SOURCE:  Hyun Koehler MD, [facility], [facility].      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  Mr. Manning has a complex medical history in addition to his recent spinal surgery.  He is reporting some history of difficulty with sleep related to anxiety that has re-emerged during this admission.  He is also working to manage significant pain related to his recent spinal surgery.  This evaluation was requested to assess his emotional status and to make treatment recommendations.      HISTORY OF PRESENT ILLNESS:  Mr. Manning  reports that he has no significant history of depression.  He notes that the only times that he has experienced feelings that he would identify as depression were the grief reactions he experienced following the death of each his parents.  He does note that he has occasional nights when he is aware that there are anxiety and problems that he is working to solve that affect his ability to sleep restfully.  He has used zolpidem very occasionally to address this prior to this hospitalization.      Currently, Mr. Manning believes that his mood is relatively positive, and he feels very hopeful about his ability to regain strength, stamina and his functional abilities to allow him to return to living independently.  He does see himself making some progress in his rehabilitation therapies.  At the same time, he does continue to report difficulty with sleeping that he attributes to anxiety.  He is not able to identify any specific anxious thoughts, but acknowledges that given the multiple medical issues that he is dealing with and his determination to return to a level of independence that he feels that anxiety is likely the source of his difficulty with sleep initiation.      Mr. Manning has been very motivated within his rehabilitation therapies.  At the same time, these have been somewhat limited secondary to dizziness thought to be related to orthostatic hypotension.  He was also noted earlier this week to have elevated heart rates in the 130s with activity and following a Cardiology consultation, atrial flutter was identified.      Mr. Manning was appreciative of his contact with Health Psychology, and would be open to additional contact throughout this [facility] admission.      SOCIAL HISTORY:  Mr. Manning grew up in [place] and graduated from [employer].  Following that, he attended [employer] for approximately a year and a half before transferring to [employer].  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  He did return to [employer] and completed his undergraduate work following his [employer] Service.  He was never put on active duty and considers himself very zheng in that regard.  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  He notes how well this meshed with his love of travel.  Mr. Manning never , but has been involved for approximately 30 years with his significant other, Teresa; they have been living together for the past 4 years.  He notes that she has children from a previous marriage, but he is not very close with them, although there is no ill will or tension in the relationships.  Mr. Manning talks with great fondness about the friends that he still maintains close connections with from his [employer] years and tells me about some travel that they have done together fairly recently.      MEDICAL HISTORY:  Mr. Manning has a complex medical history including coronary artery disease, status post a three-vessel coronary arterial bypass in 1995 and cardiomyopathy with an ejection fraction of approximately 45%.  He was diagnosed approximately 8 years ago with CLL and underwent chemotherapy approximately 4 years ago which has left him in remission and he is pleased to be able to say that he is cancer free.  He has PAD and has lost one of his great toes secondary to this.  Please see Mr. Manning's [facility] electronic medical record for more complete information on his complex medical history, current admission and status and all medications.      PSYCHIATRIC HISTORY:  As above in HPI.  No other significant psychiatric history was available at the time of this evaluation.      BEHAVIORAL IMPRESSIONS:  Mr. Manning presented for this evaluation as a pleasant man who looks his chronological " age.  We met briefly this morning and made a plan to spend more time together this afternoon.  I note that despite documentation of some cognitive changes and difficulty with memory, that he recalled that morning conversation and informed his physical therapist that I would be arriving at approximately 3:30 or 4:00.  He reported his mood as generally positive, but exhibited an affect that was clearly preoccupied with physical discomfort and pain.  His focus on pain did affect his ability to engage fully in conversation.  At the same time, his speech was clear, coherent and goal oriented.  He was a good  of his own history when he was not distracted by physical discomfort.  Insight and judgment appear to be intact.  He exhibited no evidence of distortions of thought or perception.      IMPRESSIONS AND PLAN:  Tad Manning is a 76-year-old man with a complex medical history who recently underwent spinal surgery to address an epidural abscess related to thoracic osteomyelitis and diskitis.  Mr. Manning has some history of infrequent but distressing difficulty with sleep that he attributes to anxiety.  He has been experiencing this during this [facility] admission.  He has Seroquel available to him at bedtime.  I note that he can have a 12.5 mg dose that can be followed 2 hours later by another 12.5 mg dose if he is not able to initiate sleep.  Although he has used Ambien in the past to address this issue, it would be preferable to assess the efficacy of Seroquel, which will also potentially assist with any anxiety.  Mr. Manning was very appreciative of this contact with Health Psychology.  He agreed that I might see him again next week and reassess if behavioral strategies to assist with sleep initiation and management of anxiety could be helpful.      DIAGNOSES:  Adjustment disorder with anxiety (F43.22).         MASTER MAURICIO, PHD, LP      *In accordance with the Rules of the  [place] Board of Psychology, it is noted that psychological descriptions and scientific procedures underlying psychological evaluations have limitations.  Absolute predictions cannot be made based on information in this report.     D: 2017 17:00   T: 2017 18:43   MT: ANDREW      Name:     PETER MANNING   MRN:      [id number]        Account:       [id number]   :      1940           Consult Date:  2017      Document: [id number]       cc: CESAR SALAZAR MD[DB1.1]        Revision History        User Key Date/Time User Provider Type Action    > DB1.1 2017  4:25 PM Melissa Rodriguez, PhD LP Psychologist Sign     [N/A] 2017  6:44 PM Melissa Rodriguez, PhD LP Psychologist Edit            Consults by Magdalene Alejandro MD at 2017  1:41 PM     Author:  Magdalene Alejandro MD Service:  Cardiology Author Type:  Physician    Filed:  2017 10:06 PM Date of Service:  2017  1:41 PM Note Created:  2017  1:41 PM    Status:  Signed :  Magdalene Alejandro MD (Physician)     Consult Orders:    1. Cardiology IP Consult: Patient to be seen: Routine - within 24 hours; new atrial flutter and orthostatic hypotension; Consultant may enter orders: Yes [[id number]] ordered by Osmin Lange MD at 17 1038                     Cardiology Inpatient Consultation  2017    Reason for Consult:  A cardiology consult was requested by Dr. Lange from the physical medicine service to provide clinical guidance regarding new diagnosis of atrial flutter.    HPI:   Peter Manning is a 76 year old male with history of CAD s/p 3 vessel CAB in , cardiomyopathy with LVEF 40-45%, DM, CLL in remission, HTN and PVD s/p iliac artery stents and left lower extremity bypass who was transferred from [facility] for surgery to address an epidural abscess and infectious spondylitis. He had fallen at home prompting his visit to [facility]. He had been experiencing increasingly frequent falls associated with  bilateral leg weakness and lower back pain. 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 He was transferred to [facility] for urgent surgical decompression of the spinal cord. Subsequently he was taken back to the OR on 2/13 for T7-12 posterior fusion with TLIF T9-10. All this further complicated by blood cultures which grew strep mitis for which ID has been following.     He was admitted to the acute rehab unit on 2/15. With PT he's had ongoing issues with lightheadedness and dizziness upon position change from laying to sitting or standing. According to the patient this has been on ongoing issue for him for several weeks prior to admission. He was found to have vital signs consistent with orthostatic hypotension. Blood pressure lying was recorded as 144/65. Upon sitting up blood pressure dropped to 114/65. HR was unchanged at 78 bpm.     Yesterday he was noted to have elevated heart rates in the 130s with acitivty. An EKG was obtained and identified atrial flutter with frequent PVCs and controlled heart rate. At rest heart rates look to be controlled. With subsequent PT and OT sessions heart rates again pick back up to the 130s and 140s. EKG was repeated today and again shows atrial flutter with heart rate of 88 bpm.     At the time of interview, the patient denies chest pain or dyspnea at rest or with exertion. He has no awareness of the arrhythmia denying any palpitations. He states that in the past he's been told he has had some irregular heart beats, but does not recall ever being told he was atrial fibrillation or atrial flutter.     Review of Systems:    Complete review of systems was performed and negative except per HPI.    PMH:[LW1.1]  Past Medical History   Diagnosis Date     Arthritis      ASCVD (arteriosclerotic cardiovascular disease)      BMI 30.0-30.9,adult      BPH (benign  prostatic hypertrophy)      Cellulitis      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)      Coronary artery disease      triple bypass 1995     Diabetes mellitus (H)      Diabetic polyneuropathy (H)      History of blood transfusion      Hyperlipidaemia      Hypertension      Malignant neoplasm (H)      CLL     Noninflammatory pericardial effusion      Osteomyelitis of left foot (H)      PVD (peripheral vascular disease) (H)      Sebaceous cyst[LW1.2]      Active Problems:[LW1.1]  Patient Active Problem List    Diagnosis Date Noted     Osteomyelitis of thoracic spine (H) 02/15/2017     Priority: Medium     Epidural abscess 02/09/2017     Priority: Medium     Discitis 02/08/2017     Priority: Medium     Fall 02/07/2017     Priority: Medium     Renal failure 01/20/2017     Priority: Medium     Renal insufficiency 01/20/2014     Priority: Medium     Diabetes mellitus, type 2 (H) 01/11/2014     Priority: Medium     HTN (hypertension) 01/11/2014     Priority: Medium     CAD (coronary artery disease) 01/11/2014     Priority: Medium     Hyperlipidemia with target LDL less than 100 01/11/2014     Priority: Medium     Diagnosis updated by automated process. Provider to review and confirm.       Osteomyelitis left 2nd Toe s/p amputaion 1/10/14 01/11/2014     Priority: Medium     Critical lower limb ischemia , s/p L AK pop to BK pop bypass with rGSV on 1/10/2014 01/10/2014[LW1.2]     Social History:[LW1.1]  Social History   Substance Use Topics     Smoking status: Former Smoker     Packs/day: 2.00     Years: 30.00     Types: Cigarettes     Quit date: 6/19/1995     Smokeless tobacco: Never Used     Alcohol use Yes      Comment: 1 drink monthly[LW1.2]     Family History:[LW1.1]  Family History   Problem Relation Age of Onset     CANCER Mother      leukemia[LW1.2]       Medications:[LW1.1]    metFORMIN  500 mg Oral BID w/meals     clopidogrel  75 mg Oral Daily     insulin glargine  20 Units  Subcutaneous QAM AC     acetaminophen  1,000 mg Oral TID     miconazole   Topical BID     ascorbic acid (VITAMIN C) tablet 500 mg  500 mg Oral Daily     aspirin  325 mg Oral Daily     atenolol  25 mg Oral Daily     atorvastatin  40 mg Oral Daily     cefTRIAXone  2 g Intravenous Q12H     cholecalciferol  1,000 Units Oral Daily     tamsulosin  0.8 mg Oral At Bedtime     insulin aspart  1-7 Units Subcutaneous TID AC     insulin aspart  1-5 Units Subcutaneous At Bedtime     heparin lock flush  5-10 mL Intracatheter Q24H         - MEDICATION INSTRUCTIONS -[LW1.2]         Physical Exam:[LW1.1]  Temp:  [96.3  F (35.7  C)-97.7  F (36.5  C)] 97.7  F (36.5  C)  Pulse:  [] 85  Resp:  [16] 16  BP: (125-150)/(61-80) 125/76  SpO2:  [94 %-99 %] 94 %[LW1.2]    Intake/Output Summary (Last 24 hours) at 02/22/17 1344  Last data filed at 02/22/17 0800   Gross per 24 hour   Intake                0 ml   Output             1125 ml   Net            -1125 ml     GEN: pleasant, no acute distress  HEENT: no icterus  CV: S1S2 irr irr. No murmur appreciated.    CHEST: clear to ausculation bilaterally, no rales or wheezing  ABD: soft, non-tender, normal active bowel sounds  EXTR: pulses diminished to lower extremities. No clubbing or cyanosis. Bilateral pedal/ankle edema L>R  NEURO: alert oriented, speech fluent/appropriate, motor grossly nonfocal    Diagnostics:  All labs and imaging were reviewed, of note:    All laboratory and imaging data in the past 24 hours reviewed[LW1.1]    Lab Results   Component Value Date    TROPI  02/07/2017     <0.015  The 99th percentile for upper reference range is 0.045 ug/L.  Troponin values in   the range of 0.045 - 0.120 ug/L may be associated with risks of adverse   clinical events.[LW1.2]         EKG:              Transthoracic echocardiogram:   1/21/17  The left ventricle is mildly dilated at 6.0 cm. Left ventricular systolic  function is mild to moderately reduced as the visual ejection fraction  is  estimated at 40-45%.  The biplane calculated LVEF = 43%. This decrease in the LVEF is due to mild  global hypokinesia and moderate to severe inferolateral wall hypokinesis.  Grade I or early diastolic dysfunction is noted  There is trace tricuspid regurgitation. The right ventricular systolic  pressure is normal approximated at 16mmHg plus the right atrial pressure.  The left atrium is moderately dilated by volume criteria at 41.6 ml/m2.  There is mild trileaflet aortic sclerosis but no aortic stenosis or  regurgitation is present.  _____________________________________________________________________________  __        Left Ventricle  The left ventricle is mildly dilated. at 6.0 cm. There is normal left  ventricular wall thickness. Grade I or early diastolic dysfunction. E by E  prime ratio is less than 8, that likely suggests normal left ventricular  filling pressures. The visual ejection fraction is estimated at 40-45%. The  biplane calculated LVEF = 43%. Left ventricular systolic function is mild to  moderately reduced. There is mild global hypokinesia of the left ventricle.  There is moderate to severe inferolateral wall hypokinesis. There is no  thrombus seen in the left ventricle.     Right Ventricle  Normal right ventricle structure and size. The right ventricular systolic  function is mildly reduced.     Atria  The left atrium is moderately dilated. Left atrial enlargement is noted by  volume criteria. at 41.6 ml/m2. Right atrial size is normal. Intact atrial  septum.     Mitral Valve  There is mild to moderate mitral annular calcification. There is no evidence  of mitral valve prolapse. There is no mitral valve stenosis.        Tricuspid Valve  The tricuspid valve is normal in structure and function. Right ventricle  systolic pressure estimate normal. There is trace tricuspid regurgitation. The  right ventricular systolic pressure is approximated at 16.6 mmHg plus the  right atrial pressure. The right  ventricular systolic pressure is approximated  at 16mmHg plus the right atrial pressure.     Aortic Valve  The aortic valve is normal in structure and function. There is mild trileaflet  aortic sclerosis. No aortic regurgitation is present. No aortic stenosis is  present.     Pulmonic Valve  The pulmonic valve is not well seen, but is grossly normal. There is trace  pulmonic valvular regurgitation.     Vessels  Normal size aorta. The inferior vena cava is not dilated.     Pericardium  The pericardium appears normal. There is no pleural effusion.        Rhythm  The rhythm was normal sinus.      Nuclear stress test:    Assessment and Recommendation:    Mr. Manning is a 76 year old male with history of CAD s/p CAB in 1995 as well as mild chronic left ventricular systolic dysfunction who was hospitalized after suffering a fall and experiencing ongoing back pain and weakness involving the lower limbs. 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 Subsequently he was taken back to the OR for T7-12 posterior fusion with TLIF T9-10. Additionally he's being treated for bacteremia. Currently participating in rehab on the acute rehab unit. Problems with positional dizziness prompted blood pressure and heart rate checks. He's been found to have orthostatic hypotension, but over the past couple of days has also been noted to have high heart rates with activity. EKGs have identified atrial flutter. Cardiology has been asked to consult to help manage his arrhythmia.       1. Atrial flutter  -currently on atenolol 25 mg daily; heart rates are controlled at rest but up to 130-140 with activity; blood pressures are stable and resting could tolerate an increase in beta blocker; however this is complicated  by symptomatic orthostatic hypotension  -CHADSVASC score is 4 (CHF, HTN, Age, DM, vascular disease) and long term AC would be recommended; however risk vs benefit would need to closely evaluated. With history of frequent falls we would be concerned about head injury. That being said, if falls were related to spinal cord impingement this may no longer be as much of a concern. Currently he is on ASA only.   -at this point, given that he is asymptomatic, we would recommend continuing with present management including beta blocker; he will need cardiology follow up as an outpatient in 2 months at which time we will need to once again address the potential for AC and further management of atrial flutter. If AC is commenced in the future and he is to remain on the plavix, we would discontinue the ASA given significant bleeding risk with triple therapy.  -agree with echocardiogram; results pending    2. Orthostatic hypotension  -suspect his symptoms of dizziness are related to blood pressure drop with position change and not atrial flutter  -agree with compression stockings and abdominal binder.  -maintain hydration  -would also recommend lower extremity isometric exercises prior to position change; especially sitting to standing  -avoid resting hypotension    3. CAD  -currently stable; no complaints of angina or anginal equiv  -continue with beta blocker, ASA, and statin      I have discussed the above with Dr. Alejandro.    Thank you for consulting the cardiovascular services at the [facility]. Please do not hesitate to call us with any questions.     OFELIA Singh, CNP  [facility] Cardiology Consult Team  Pager [phone][LW1.1]    I have seen, interviewed, and examined patient. I reviewed the management plan with the patient.  I have reviewed the laboratory tests, imaging, and other investigations.  Discussed with the team and agree with the findings and plan in this resident/fellow/nurse  practitioner's note. In addition, changes in the physical examination, assessment and plan have been incorporated into the note by myself, as to make it a single cohesive document. Plan was communicated to referring team/physician.      Magdalene Alejandro MD, MS  Cardiology/Cardiac EP Attending Staff[LC1.1]           Revision History        User Key Date/Time User Provider Type Action    > LC1.1 2/22/2017 10:06 PM Magdalene Alejandro MD Physician Sign     LW1.2 2/22/2017  3:34 PM Yecenia Mulligan APRN CNP Nurse Practitioner Sign     LW1.1 2/22/2017  1:41 PM Yecenia Mulligan APRN CNP Nurse Practitioner             Consults signed by Kristal Fontaine MD at 2/16/2017  2:51 PM      Author:  Kristal Fontaine MD Service:  Internal Medicine Author Type:  Physician    Filed:  2/16/2017  2:51 PM Date of Service:  2/15/2017  3:59 PM Note Created:  2/15/2017  4:58 PM    Status:  Addendum :  Kristal Fontaine MD (Physician)         DATE OF ADMISSION:  02/15/2017.      DATE OF SERVICE:  02/15/2017.      REQUESTING PHYSICIAN:  Dr. Koehler.      REASON FOR CONSULTATION:  Diabetes.      RECOMMENDATIONS:  Tad Manning is a 76-year-old  gentleman transferred from the [facility] after T7-T12 posterior spinal segmental instrumentation and T9-T10 interbody fusion and posterior arthrodesis T70-T12 for a spinal abscess with osteomyelitis and diskitis.   1.  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  The patient should have a CBC, CRP, creatinine and LFTs done weekly.  Followup with Infectious Disease, Dr. Gillian Max on 03/23/2017.   2.  Diabetes mellitus:  Continue the Lantus at night along with sliding scale insulin.   3.  Peripheral vascular disease, status post bilateral lower extremity stents:  Continue aspirin and Plavix.[AS1.1] ( to  "be started 2/20)[AS1.2]  4.  Chronic lymphocytic leukemia in remission, last chemo was 09/2015:  Follow up with Oncology.   5.  Coronary artery disease:  Continue aspirin 325 mg daily, Lipitor 40 mg daily, atenolol 25 mg daily.   6.  Spinal cord compression with lower extremity weakness:  Primary team to address.      CHIEF COMPLAINT:  \"My legs are weak.\"      HISTORY OF PRESENT ILLNESS:  The patient is a 76-year-old gentleman with history of coronary artery disease, diabetes mellitus with neuropathy, hypertension, peripheral vascular disease with bilateral lower extremity stents on aspirin and Plavix, chronic lymphocytic leukemia in remission, who presented to [facility] after a fall.  He also complained of right leg weakness.  At that time he had an MRI of his lumbar spine done which revealed an epidural abscess with diskitis and he was transferred to the [facility] for further management.  The patient underwent a T9-T10 laminectomy for spinal cord decompression on 02/12/2017.  His surgery was uncomplicated and he was admitted to the floor for postoperative cares.  On postop day #1, he was doing well and working with therapy, sitting on the edge of bed with Physical Therapy.  On day #2 his drain was removed and he was able to void without a Dugan, eating a regular diet, and the pain was well controlled.  He continued to progress with therapies and therefore was transferred to acute rehabilitation unit.  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  He should have weekly CBC, CRP, creatinine, and LFTs and follow up with ID in this regard.  He was also seen by Internal Medicine and his insulin was managed by them.  He is currently taking 12 units of Lantus at night.  " For his coronary artery disease, he had a CABG done in 1995 and he has heart failure with a reduced ejection fraction, EF of 40% to 45%.  He was continued on atenolol, atorvastatin, aspirin was started on discharge.      PAST MEDICAL HISTORY:  Coronary artery bypass 1995, diabetes mellitus, blood transfusion, hypertension, CLL, hyperlipidemia, BPH, peripheral vascular disease, arthritis.      PAST SURGICAL HISTORY:  Tonsillectomy, bone marrow biopsy, fem-pop bypass.      FAMILY HISTORY:  Cancer and leukemia in mother.      SOCIAL HISTORY:  He is a former smoker, smoked about 2 packs a day for 30 years.  Does not drink any alcohol.      MEDICATIONS:  Reviewed and reconciled.      ALLERGIES:  No known drug allergies.      REVIEW OF SYSTEMS:  All 10 systems were reviewed.  The positives are mentioned above.  In addition, he has lower extremity weakness, but denies fever, chills, nausea, vomiting, headache.  He also has mild constipation.      PHYSICAL EXAMINATION:   GENERAL:  He is alert and oriented in no acute distress, pleasant gentleman accompanied by his brother and sister-in-law.   VITAL SIGNS:  Blood pressure is 122/64, temperature is 96.1, heart rate is 72.   HEENT:  Atraumatic, normocephalic.  Pupils are equal, round, reactive to light.  EOMI.  Mouth mucosa is moist.   NECK:  Supple.   CHEST:  Clear bilaterally.  No rales or rhonchi.   CARDIOVASCULAR:  S1, S2, no murmurs, rubs, gallops.   ABDOMEN:  Soft, nontender, bowel sounds are positive.   EXTREMITIES:  No edema.   SPINE:  Was not examined.  He has a PICC line in his right arm, the site of which is clean, dry and intact.   NEUROLOGIC:  Cranial nerves II through XII are grossly intact.  Power and tone are equal in the upper extremities.  Lower extremities were not tested.  Gait was not tested.      Thank you for the consultation.         MIGUELITO FALCON MD             D: 02/15/2017 15:59   T: 02/15/2017 16:57   MT:       Name:     PETER BUTLER   MRN:       [id number]        Account:       [id number]   :      1940           Consult Date:  02/15/2017      Document: [id number]       cc: CESAR KOEHLER MD[AS1.1]        Revision History        User Key Date/Time User Provider Type Action    > AS1.2 2017  2:51 PM Kristal Fontaine MD Physician Addend     AS1.1 2017 10:03 AM Kristal Fontaine MD Physician Sign            Consults by Kristal Fontaine MD at 2/15/2017  3:17 PM     Author:  Kristal Fontaine MD Service:  Internal Medicine Author Type:  Physician    Filed:  2/15/2017  3:31 PM Date of Service:  2/15/2017  3:17 PM Note Created:  2/15/2017  3:31 PM    Status:  Signed :  Kristal Fontaine MD (Physician)     Consult Orders:    1. Internal Medicine Rehab Adult IP Consult: Patient to be seen: Routine within 24 hrs; Call back #: [phone]; medical management; Consultant may enter orders: Yes [[id number]] ordered by Cesar Koehler MD at 02/15/17 1232                dictated[AS1.1]     Revision History        User Key Date/Time User Provider Type Action    > AS1.1 2/15/2017  3:31 PM Kristal Fontaine MD Physician Sign                     Progress Notes - Physician (Notes from 17 through 17)      Progress Notes by Dale Leroy MD at 3/18/2017 10:53 PM     Author:  Dale Leroy MD Service:  Physical Medicine and Rehabilitation Author Type:  Physician    Filed:  3/18/2017 10:58 PM Date of Service:  3/18/2017 10:53 PM Note Created:  3/18/2017 10:53 PM    Status:  Signed :  Dale Leroy MD (Physician)         PM&R Daily Note:    Pulling together details for discharge to TCU tomorrow. Orders and d/c summary done.  Reviewed with hospitalist  Will stop SS / carb count aspart as was getting very low doses of that. Lantus increased to 22 units daily.  Outlined for f/u with IF 3/23 and consideration for stop of IV abx. >>20>>74>>81. No leukocytosis or fever.  Weights stable with lower furosemide dose, had  been 40+20, now just 40.  Na up slightly, should trend down with bit better hydration.  Still with some variable block of a flutter with tachycardia. Metoprolol increased to 75 mg BID. Monitor for hypotension and HR.      Vitals:    03/18/17 1056 03/18/17 1345 03/18/17 1558 03/18/17 2015   BP: 146/85  116/63 134/74   BP Location: Left arm  Left arm    Pulse: 131  74    Resp:   16    Temp:   95.6  F (35.3  C)    TempSrc:   Oral    SpO2:  98% 97%    Weight:       Height:         Alert, conversant  No diaphoresis  Heart tachy  Lungs clear  LE's with 1+ edema  Strength slightly weaker right leg than left  Hip flexions with 3, knee ext, ADF & EHL 4's, APF 5-.    Coordination of care:  30 minutes  Face-to-face:              10 minutes  Total time:                  40 minutes[SC1.1]     Revision History        User Key Date/Time User Provider Type Action    > SC1.1 3/18/2017 10:58 PM Dale Leroy MD Physician Sign            Progress Notes by Boris Melendez MD at 3/18/2017  9:03 AM     Author:  Boris Melendez MD Service:  Hospitalist Author Type:  Physician    Filed:  3/18/2017  9:19 AM Date of Service:  3/18/2017  9:03 AM Note Created:  3/18/2017  9:03 AM    Status:  Addendum :  Boris Melendez MD (Physician)         [facility]   Internal Medicine Daily Note          Assessment and Plan:    Tad Manning is a 76-year-old  gentleman [facility] T7-T12 posterior spinal segmental instrumentation and T9-T10 interbody fusion and posterior arthrodesis T70-T12 for a spinal abscess with osteomyelitis and diskitis.     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   Weekly CBC, CRP and LFT's. Labs on 3/17 with normal LFT's  CRP at 81 ( on 3/13 at  74.7)  Stable WBC and platelet counts .Followup with Infectious Disease, Dr. Gillian Max on 03/23/2017.     # DM Type 2 ( well controlled- A1c on 2/10/2017 @ 6.1)   Continue Lantus 20U , Metformin 500mg bid .Since patient is requiring very minimal amounts of short acting insulin, I will stop sliding scale. Increase Lantus to 22 units.     # Acute systolic CHF   Echo on 02/24 showed decreased LVEF and severe global hypokinesis. Evaluated by cardiology   Currently on medical treatment for heart failure with beta blocker ( metoprolol 75 mg BID)  , ACE inhibitor ( Lisinopril 10 mg daily) and diuresis   Given some lightheadedness and ortho stasis, Lasix reduced to 40 mg daily on 3/15 ( from 40 mg am and 20 mg pm). Scheduled potassium supplement at 20 meq initiated on 3/15  Continue with daily weights. Stable between 88-91 Kgs.  Cardiology f/u after d/c with repeat echo     #Tachycardia:    Atrial flutter with variable block , electrolytes stable .CHADSVASC score is 4 (CHF, HTN, Age, DM, vascular disease) and long term AC would be recommended; however risk vs benefit would need to closely evaluated. With history of frequent falls we would be concerned about head injury. I discussed with primary thompson as well, who feel that the risk of falls is high.  Was initiated on Metoprolol 50 BID , increased to 75 mg BID on 3/18  In any case, patient is on aspirin 325 mg  and clopidogrel 75 mg   Issues of anticoagulation to be re discussed with cardiology as outpatient     Peripheral vascular disease, status post bilateral lower extremity stents:   Continue aspirin , Plavix( started 2/20)     # Chronic lymphocytic leukemia in remission, last chemo was 09/2015: Follow up with Oncology.   # Coronary artery disease: Continue aspirin 325 mg daily, Lipitor 40 mg daily, (Changed atenolol to Metoprolol 2/24).   # Spinal cord compression with lower extremity weakness: plan per Primary team            Anemia of Chronic disease:  Stable.  "Hgb at 8.6. No issues with lightheadedness or hypotension  Transfusion for Hgb <7    Consulting teams: Internal Medicine   Code status: Full   DVT Prophylaxis: Aspirin 325 mg   Gastric prophylaxis: H2 blocker   Diet: Moderate consistent carbohydrate diet   Disposition: To be transferred to TCU tomorrow      I have done medication reconciliation for patient[TK1.1] in preparation for discharge of patient tomorrow[TK1.2]   Follow up plan as follows:  On 3/23: follow up with ID ( Dr Jeannie Max)  On 5/1 : Follow up with Cardiology ( Dr Clarisse Valdes)  Follow up with Oncology as needed.    I will not plan on seeing patient tomorrow prior to discharge unless needed.            Interval History:   Progress notes in the last 24 hrs by MDT team has been reviewed.  Patient continues to feel well this morning.  Denies chest pain/ SOB  Denies fevers or chills  Eating and drinking well  Due to protracted path of progress with rehabilitation, patient has now been determined to be a TCU candidate and will be discharging to [facility] TCU tomorrow        Review of Systems:   A comprehensive review of systems was performed and found to be negative except: Those that are outlined in interval history              Medications:   I have reviewed this patient's current medications which are outlined in the \"current medication\" section of EPIC             Physical Exam:   Vitals were reviewed[TK1.1]  Temp: 96.9  F (36.1  C) Temp src: Oral BP: 132/73 Pulse: 130 Heart Rate: 83 Resp: 16 SpO2: 97 % O2 Device: None (Room air)[TK1.3]    Constitutional:   awake, alert, cooperative, no apparent distress, and appears stated age     Lungs:   No increased work of breathing, good air exchange, clear to auscultation bilaterally, no crackles or wheezing     Cardiovascular:   Irregular heart sounds. Mild tachycardia. No murmurs. No peripheral edema      Abdomen:   No scars, normal bowel sounds, soft, non-distended, non-tender, no masses palpated, " no hepatosplenomegally     Musculoskeletal:   no lower extremity pitting edema present. Back incision has healed well      Neurologic:   Awake, alert, oriented to name, place and time.  Cranial nerves II-XII are grossly intact.             Data:     Most recent labs have been evaluated and relevant labs are outlined below :    BMP[TK1.1]    Recent Labs  Lab 03/17/17  0544 03/16/17  0532 03/15/17  0742 03/14/17  0703 03/13/17  0758 03/12/17  2123   *  --  143  --  143 142   POTASSIUM 3.7 3.5 3.6 3.5 3.5 3.0*   CHLORIDE 111*  --  108  --  109 106   JUSTINO 8.6  --  8.4*  --  8.1* 7.9*   CO2 26  --  24  --  24 26   BUN 16  --  18  --  14 14   CR 0.57*  --  0.66  --  0.57* 0.57*   *  --  146*  --  156* 194*[TK1.3]     CBC[TK1.1]    Recent Labs  Lab 03/17/17 0544 03/15/17  0742 03/13/17  0758 03/12/17  2123   WBC 5.1 5.6 5.4 5.9   RBC 3.21* 3.12* 3.21* 3.13*   HGB 8.6* 8.1* 8.7* 8.5*   HCT 29.0* 27.7* 28.7* 28.0*   MCV 90 89 89 90   MCH 26.8 26.0* 27.1 27.2   MCHC 29.7* 29.2* 30.3* 30.4*   RDW 16.7* 16.9* 17.0* 17.2*    173 180 175[TK1.3]     INR[TK1.1]No lab results found in last 7 days.[TK1.3]  LFTs[TK1.1]    Recent Labs  Lab 03/17/17  0544 03/15/17  0742 03/13/17 0758   ALKPHOS 140 138 163*   AST 13 20 12   ALT 25 24 23   BILITOTAL 0.3  --  0.5   PROTTOTAL 5.5* 5.4* 5.4*   ALBUMIN 2.2* 2.1* 2.1*[TK1.3]      PANC[TK1.1]No lab results found in last 7 days.[TK1.3]          Dr NIRANJAN Schaefer MD  Hospitalist ( Internal medicine)  Pager: [phone][TK1.1]           Revision History        User Key Date/Time User Provider Type Action    > TK1.2 3/18/2017  9:19 AM Boris Melendez MD Physician Addend     TK1.3 3/18/2017  9:10 AM Boris Melendez MD Physician Sign     TK1.1 3/18/2017  9:03 AM Boris Melendez MD Physician             Progress Notes by Dale Leroy MD at 3/17/2017  6:26 PM     Author:  Dale Leroy MD Service:  Physical Medicine and Rehabilitation  Author Type:  Physician    Filed:  3/17/2017  6:29 PM Date of Service:  3/17/2017  6:26 PM Note Created:  3/17/2017  6:26 PM    Status:  Signed :  Dale Leroy MD (Physician)         PM&R Daily Note:    Was able to get placeemnt at a TCU for Sunday. Will proceed with that plan. His progress has been slow. Do anticipate he'll need longer term wheelchair use and accessible housing.  Limitations with strength and cardio/pulmonary endurance. Also some cognitive mild deficits woul impact his independent living with his complex situation.    Vitals:    03/17/17 0834 03/17/17 0901 03/17/17 1556 03/17/17 1643   BP: 146/79 144/78  115/58   BP Location:  Left arm  Left arm   Pulse: 132 107     Resp:  17 16    Temp:  95.9  F (35.5  C) 97.6  F (36.4  C)    TempSrc:  Oral Oral    SpO2:  94% 96%    Weight:       Height:         Appreciate hospitalist consult support.   Finishing ceftriaxone 3/24  DM with stable insulin metformin dosing  Diuretics continue at lower dose, following tachycardia[SC1.1]     Revision History        User Key Date/Time User Provider Type Action    > SC1.1 3/17/2017  6:29 PM Dale Leroy MD Physician Sign            Progress Notes by Boris Melendez MD at 3/17/2017  8:15 AM     Author:  Boris Melendez MD Service:  Hospitalist Author Type:  Physician    Filed:  3/17/2017  8:17 AM Date of Service:  3/17/2017  8:15 AM Note Created:  3/17/2017  8:15 AM    Status:  Signed :  Boris Melendez MD (Physician)         [facility], [place]   Internal Medicine Daily Note          Assessment and Plan:    Tad Manning is a 76-year-old  gentleman transferred from the [facility] after T7-T12 posterior spinal segmental instrumentation and T9-T10 interbody fusion and posterior arthrodesis T70-T12 for a spinal abscess with osteomyelitis and diskitis.     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   Weekly CBC, CRP and LFT's. Labs on 3/15 with normal LFT's  CRP at 81 ( on 3/13 at 74.7)  Stable WBC and platelet counts .Followup with Infectious Disease, Dr. Gillian Max on 03/23/2017.     # DM Type 2 ( well controlled- A1c on 2/10/2017 @ 6.1)   Continue Lantus 20U , Metformin 500mg bid , Correction scale insulin  Stable sugars      # Acute systolic CHF   Echo on 02/24 showed decreased LVEF and severe global hypokinesis. Evaluated by cardiology   Currently on medical treatment for heart failure with beta blocker ( metoprolol 50 mg BID)  , ACE inhibitor ( Lisinopril 10 mg daily) and diuresis   Given some lightheadedness and ortho stasis, Lasix reduced to 40 mg daily on 3/15 ( from 40 mg am and 20 mg pm). Scheduled potassium supplement at 20 meq initiated on 3/15  Continue with daily weights. Stable between 88-91 Kgs.  Cardiology f/u after d/c with repeat echo     #Tachycardia:    Atrial flutter with variable block , electrolytes stable .CHADSVASC score is 4 (CHF, HTN, Age, DM, vascular disease) and long term AC would be recommended; however risk vs benefit would need to closely evaluated. With history of frequent falls we would be concerned about head injury. I discussed with primary thompson as well, who feel that the risk of falls is high.  In any case, patient is on aspirin 325 mg  and clopidogrel 75 mg   Issues of anticoagulation to be re discussed with cardiology as outpatient     Peripheral vascular disease, status post bilateral lower extremity stents:   Continue aspirin , Plavix( started 2/20)     # Chronic lymphocytic leukemia in remission, last chemo was 09/2015: Follow up with Oncology.   # Coronary artery disease: Continue aspirin 325 mg daily, Lipitor 40 mg daily, (Changed atenolol to Metoprolol 2/24).   # Spinal cord compression with lower extremity  "weakness: plan per Primary team            Anemia of Chronic disease:  Stable. Hgb at 8.6. No issues with lightheadedness or hypotension  Transfusion for Hgb <7    Consulting teams: Internal Medicine   Code status: Full   DVT Prophylaxis: Aspirin 325 mg   Gastric prophylaxis: H2 blocker   Diet: Moderate consistent carbohydrate diet   Disposition: to be determined       Patient seen and examined with RN         Interval History:   Progress notes in the last 24 hrs by MDT team has been reviewed.  Feels well this morning   Slept well   denies chest pain/ SOB  Denies fevers or chills        Review of Systems:   A comprehensive review of systems was performed and found to be negative except: Those that are outlined in interval history              Medications:   I have reviewed this patient's current medications which are outlined in the \"current medication\" section of EPIC             Physical Exam:   Vitals were reviewed[TK1.1]  Temp: 97.3  F (36.3  C) Temp src: Oral BP: 118/69 Pulse: 107 Heart Rate: 86 Resp: 18 SpO2: 98 % O2 Device: None (Room air) Oxygen Delivery: 2 LPM[TK1.2]  Constitutional:   awake, alert, cooperative, no apparent distress, and appears stated age     Lungs:   No increased work of breathing, good air exchange, clear to auscultation bilaterally, no crackles or wheezing     Cardiovascular:   Irregular heart sounds. Mild tachycardia. No murmurs. No peripheral edema      Abdomen:   No scars, normal bowel sounds, soft, non-distended, non-tender, no masses palpated, no hepatosplenomegally     Musculoskeletal:   no lower extremity pitting edema present. Back incision has healed well      Neurologic:   Awake, alert, oriented to name, place and time.  Cranial nerves II-XII are grossly intact.             Data:     Most recent labs have been evaluated and relevant labs are outlined below :    BMP[TK1.1]    Recent Labs  Lab 03/17/17  0544 03/16/17  0532 03/15/17  0742 03/14/17  0703 03/13/17  0758 " "03/12/17 2123   *  --  143  --  143 142   POTASSIUM 3.7 3.5 3.6 3.5 3.5 3.0*   CHLORIDE 111*  --  108  --  109 106   JUSTINO 8.6  --  8.4*  --  8.1* 7.9*   CO2 26  --  24  --  24 26   BUN 16  --  18  --  14 14   CR 0.57*  --  0.66  --  0.57* 0.57*   *  --  146*  --  156* 194*[TK1.2]     CBC[TK1.1]    Recent Labs  Lab 03/17/17  0544 03/15/17  0742 03/13/17  0758 03/12/17 2123   WBC 5.1 5.6 5.4 5.9   RBC 3.21* 3.12* 3.21* 3.13*   HGB 8.6* 8.1* 8.7* 8.5*   HCT 29.0* 27.7* 28.7* 28.0*   MCV 90 89 89 90   MCH 26.8 26.0* 27.1 27.2   MCHC 29.7* 29.2* 30.3* 30.4*   RDW 16.7* 16.9* 17.0* 17.2*    173 180 175[TK1.2]     INR[TK1.1]No lab results found in last 7 days.[TK1.2]  LFTs[TK1.1]    Recent Labs  Lab 03/17/17  0544 03/15/17  0742 03/13/17  0758   ALKPHOS 140 138 163*   AST 13 20 12   ALT 25 24 23   BILITOTAL 0.3  --  0.5   PROTTOTAL 5.5* 5.4* 5.4*   ALBUMIN 2.2* 2.1* 2.1*[TK1.2]      PANC[TK1.1]No lab results found in last 7 days.[TK1.2]          Dr NIRANJAN Schaefer MD  Hospitalist ( Internal medicine)  Pager: [phone][TK1.1]           Revision History        User Key Date/Time User Provider Type Action    > TK1.2 3/17/2017  8:17 AM Boris Melendez MD Physician Sign     TK1.1 3/17/2017  8:15 AM Boris Melendez MD Physician             Progress Notes by Dale Leroy MD at 3/16/2017  9:49 PM     Author:  Dale Leroy MD Service:  Physical Medicine and Rehabilitation Author Type:  Physician    Filed:  3/16/2017 10:03 PM Date of Service:  3/16/2017  9:49 PM Note Created:  3/16/2017  9:49 PM    Status:  Signed :  Dale Leroy MD (Physician)         PM&R Daily Note:    Talked with Tad about expectations and disposition. He seems to think the home evaluation \"went pretty good\" but when pressed acknowledged several barriers with space and w/c access. Given his duration out, the slow gains, additional limits with endurance and medically, do feel he'd do best in an " assisted living facility. His significant other is not seeming to be in line for providing the assistance Tad is needing including with personal cares. Despite outlining some guarded prognosis, he's wanting to continue to do as much as he can and seems not welling to entertain a different living situation yet. Preferring to get to a TCU and come back to long-term option only after further recovery time. Can begin the TCU placement process.  Some of this may be reflective of some relative optimism / denial / acceptance though also concerned some with cognitive processing and memory.    Today was feeling off and didn't perform with as much energy in AM but seeming better by afternoon sessions.     Vitals:    03/16/17 1415 03/16/17 1445 03/16/17 1553 03/16/17 2051   BP: 160/79 131/69 123/65 118/69   BP Location: Left arm Left arm Left arm    Pulse: 92 63 93 107   Resp:   18    Temp:   97.3  F (36.3  C)    TempSrc:   Oral    SpO2:   98%    Weight:       Height:         Fully alert, conversant.  No cough or wheeze  In manual wheelchair observed during some PT session some standing attempts in parallel bars. Lasting only about 30 seconds a few times. Needing min to moderate assistance.    Coordination of care:  15 minutes  Face-to-face:              20 minutes  Total time:                  35 minutes[SC1.1]      Revision History        User Key Date/Time User Provider Type Action    > SC1.1 3/16/2017 10:03 PM Dale Leroy MD Physician Sign            Progress Notes by Tate Marie at 3/16/2017  3:33 PM     Author:  Tate Marie Service:  Spiritual Health Author Type:      Filed:  3/16/2017  3:37 PM Date of Service:  3/16/2017  3:33 PM Note Created:  3/16/2017  3:33 PM    Status:  Signed :  Tate Marie ()            03/16/17 1500   Visit Information   Visit Made By Staff    Type of Visit Follow-up   Visited Patient   Interventions   Plan of  Care Review   With patient/family/proxy   Basic Spiritual Interventions   Life Review   Advanced Assessments/Interventions   Presenting Concerns/Issues Spiritual/Alevism/emotional support   SPIRITUAL HEALTH SERVICES   Spiritual Assessment Progress Note   [facility] ([facility] 5R    REFERRAL SOURCE: F/U visit to assess how Tad is faring further along in his process.   On this visit, we checked in on Tad's emotional space.   EXPERIENCE OF ILLNESS/HOSPITALIZATION: Tad said that he is feeling a bit weaker than before.   MEANING-MAKING: Tad regaled me with more stories of his [place] [employer].   SPIRITUALITY/VALUES/Sikh: n/d   COPING/SPIRITUAL PRACTICES: n/d   SUPPORT SYSTEMS: His family (his niece), and his friends.   PLAN: No further visits planned at this time.    Rev. Tate Marie  Staff   Spiritual Health Services  Pager: [phone]  Office: [phone][MH1.1]         Revision History        User Key Date/Time User Provider Type Action    > MH1.1 3/16/2017  3:37 PM Tate Marie Sign            Progress Notes by Boris Melendez MD at 3/16/2017  7:55 AM     Author:  Boris Melendez MD Service:  Hospitalist Author Type:  Physician    Filed:  3/16/2017 12:41 PM Date of Service:  3/16/2017  7:55 AM Note Created:  3/16/2017  7:55 AM    Status:  Signed :  Boris Melendez MD (Physician)         [facility], [place]   Internal Medicine Daily Note          Assessment and Plan:    Tad Manning is a 76-year-old  gentleman transferred from the [facility] after T7-T12 posterior spinal segmental instrumentation and T9-T10 interbody fusion and posterior arthrodesis T70-T12 for a spinal abscess with osteomyelitis and diskitis.     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   Weekly CBC, CRP and LFT's. Labs on 3/15 with normal LFT's  CRP at 81 ( on 3/13 at 74.7)  Stable WBC and platelet counts .Followup with Infectious Disease, Dr. Gillian Max on 03/23/2017.     # DM Type 2 ( well controlled- A1c on 2/10/2017 @ 6.1)   Continue Lantus 20U , Metformin 500mg bid , Correction scale insulin   Stable sugars      # Acute systolic CHF   Echo on 02/24 showed decreased LVEF and severe global hypokinesis. Evaluated by cardiology   Currently on medical treatment for heart failure with beta blocker ( metoprolol 50 mg BID)  , ACE inhibitor ( Lisinopril 10 mg daily) and diuresis   Given some lightheadedness and ortho stasis, Lasix reduced to 40 mg daily on 3/15 ( from 40 mg am and 20 mg pm). Scheduled potassium supplement at 20 meq initiated on 3/15  Continue with daily weights. Stable between 88-91 Kgs.  Cardiology f/u after d/c with repeat echo     #Tachycardia:    Atrial flutter with variable block , electrolytes stable .CHADSVASC score is 4 (CHF, HTN, Age, DM, vascular disease) and long term AC would be recommended; however risk vs benefit would need to closely evaluated. With history of frequent falls we would be concerned about head injury. I discussed with primary thompson as well, who feel that the risk of falls is high.  In any case, patient is on aspirin 325 mg  and clopidogrel 75 mg   Issues of anticoagulation to be re discussed with cardiology as outpatient     Peripheral vascular disease, status post bilateral lower extremity stents:   Continue aspirin , Plavix( started 2/20)     # Chronic lymphocytic leukemia in remission, last chemo was 09/2015: Follow up with Oncology.   # Coronary artery disease: Continue aspirin 325 mg daily, Lipitor 40 mg daily, (Changed atenolol to Metoprolol 2/24).   # Spinal cord compression with lower extremity weakness: plan per Primary team            Anemia of Chronic disease:  Stable. Hgb at 8.1. No issues  "with lightheadedness or hypotension  Transfusion for Hgb <7    Consulting teams: Internal Medicine   Code status: Full   DVT Prophylaxis: Aspirin 325 mg   Gastric prophylaxis: H2 blocker   Diet: Moderate consistent carbohydrate diet   Disposition: to be determined       Patient seen and examined with RN         Interval History:   Progress notes in the last 24 hrs by MDT team has been reviewed.  Tad feels well this morning   He had an uneventful night  He denies chest pain/ SOB, lightheadedness or fatigue  Denies fevers or chills  Tolerating diet well          Review of Systems:   A comprehensive review of systems was performed and found to be negative except: Those that are outlined in interval history              Medications:   I have reviewed this patient's current medications which are outlined in the \"current medication\" section of EPIC             Physical Exam:   Vitals were reviewed[TK1.1]  Temp: 96.1  F (35.6  C) Temp src: Oral BP: 115/60 Pulse: 89 Heart Rate: 86 Resp: 24 SpO2: 98 % O2 Device: None (Room air) Oxygen Delivery: 2 LPM[TK1.2]  Constitutional:   awake, alert, cooperative, no apparent distress, and appears stated age     Lungs:   No increased work of breathing, good air exchange, clear to auscultation bilaterally, no crackles or wheezing     Cardiovascular:   Irregular heart sounds. Mild tachycardia. No murmurs. No peripheral edema      Abdomen:   No scars, normal bowel sounds, soft, non-distended, non-tender, no masses palpated, no hepatosplenomegally     Musculoskeletal:   no lower extremity pitting edema present. Back incision has healed well      Neurologic:   Awake, alert, oriented to name, place and time.  Cranial nerves II-XII are grossly intact.             Data:     Most recent labs have been evaluated and relevant labs are outlined below :    [facility][TK1.1]    Recent Labs  Lab 03/16/17  0532 03/15/17  0742 03/14/17  0703 03/13/17  0758 03/12/17  2123 03/10/17  0724   NA  --  143  --  " 143 142 143   POTASSIUM 3.5 3.6 3.5 3.5 3.0* 4.0   CHLORIDE  --  108  --  109 106 109   JUSTINO  --  8.4*  --  8.1* 7.9* 8.5   CO2  --  24  --  24 26 25   BUN  --  18  --  14 14 14   CR  --  0.66  --  0.57* 0.57* 0.61*   GLC  --  146*  --  156* 194* 167*[TK1.2]     CBC[TK1.1]    Recent Labs  Lab 03/15/17  0742 03/13/17  0758 03/12/17  2123 03/10/17  0724   WBC 5.6 5.4 5.9 9.6   RBC 3.12* 3.21* 3.13* 3.78*   HGB 8.1* 8.7* 8.5* 10.2*   HCT 27.7* 28.7* 28.0* 34.6*   MCV 89 89 90 92   MCH 26.0* 27.1 27.2 27.0   MCHC 29.2* 30.3* 30.4* 29.5*   RDW 16.9* 17.0* 17.2* 17.2*    180 175 205[TK1.2]     INR[TK1.1]No lab results found in last 7 days.[TK1.2]  LFTs[TK1.1]    Recent Labs  Lab 03/15/17  0742 03/13/17  0758 03/10/17  0724   ALKPHOS 138 163* 230*   AST 20 12 13   ALT 24 23 35   BILITOTAL  --  0.5 0.5   PROTTOTAL 5.4* 5.4* 6.1*   ALBUMIN 2.1* 2.1* 2.5*[TK1.2]      PANC[TK1.1]No lab results found in last 7 days.[TK1.2]    Case discussed with  Primary team      Dr NIRANJAN Schaefer MD  Hospitalist ( Internal medicine)  Pager: [phone][TK1.1]           Revision History        User Key Date/Time User Provider Type Action    > TK1.2 3/16/2017 12:41 PM Boris Melendez MD Physician Sign     TK1.1 3/16/2017  7:55 AM Boris Melendez MD Physician                   Procedure Notes     No notes of this type exist for this encounter.      Progress Notes - Therapies (Notes from 03/16/17 through 03/19/17)     No notes of this type exist for this encounter.

## 2017-02-15 NOTE — CONSULTS
"DATE OF ADMISSION:  02/15/2017.      DATE OF SERVICE:  02/15/2017.      REQUESTING PHYSICIAN:  Dr. Koehler.      REASON FOR CONSULTATION:  Diabetes.      RECOMMENDATIONS:  Tad Manning is a 76-year-old  gentleman transferred from the [facility] after T7-T12 posterior spinal segmental instrumentation and T9-T10 interbody fusion and posterior arthrodesis T70-T12 for a spinal abscess with osteomyelitis and diskitis.   1.  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  The patient should have a CBC, CRP, creatinine and LFTs done weekly.  Followup with Infectious Disease, Dr. Gillian Max on 03/23/2017.   2.  Diabetes mellitus:  Continue the Lantus at night along with sliding scale insulin.   3.  Peripheral vascular disease, status post bilateral lower extremity stents:  Continue aspirin and Plavix. ( to be started 2/20)  4.  Chronic lymphocytic leukemia in remission, last chemo was 09/2015:  Follow up with Oncology.   5.  Coronary artery disease:  Continue aspirin 325 mg daily, Lipitor 40 mg daily, atenolol 25 mg daily.   6.  Spinal cord compression with lower extremity weakness:  Primary team to address.      CHIEF COMPLAINT:  \"My legs are weak.\"      HISTORY OF PRESENT ILLNESS:  The patient is a 76-year-old gentleman with history of coronary artery disease, diabetes mellitus with neuropathy, hypertension, peripheral vascular disease with bilateral lower extremity stents on aspirin and Plavix, chronic lymphocytic leukemia in remission, who presented to [facility] after a fall.  He also complained of right leg weakness.  At that time he had an MRI of his lumbar spine done which revealed an epidural abscess with diskitis and he was transferred to the [facility] for further management.  The " patient underwent a T9-T10 laminectomy for spinal cord decompression on 02/12/2017.  His surgery was uncomplicated and he was admitted to the floor for postoperative cares.  On postop day #1, he was doing well and working with therapy, sitting on the edge of bed with Physical Therapy.  On day #2 his drain was removed and he was able to void without a Dugan, eating a regular diet, and the pain was well controlled.  He continued to progress with therapies and therefore was transferred to acute rehabilitation unit.  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  He should have weekly CBC, CRP, creatinine, and LFTs and follow up with ID in this regard.  He was also seen by Internal Medicine and his insulin was managed by them.  He is currently taking 12 units of Lantus at night.  For his coronary artery disease, he had a CABG done in 1995 and he has heart failure with a reduced ejection fraction, EF of 40% to 45%.  He was continued on atenolol, atorvastatin, aspirin was started on discharge.      PAST MEDICAL HISTORY:  Coronary artery bypass 1995, diabetes mellitus, blood transfusion, hypertension, CLL, hyperlipidemia, BPH, peripheral vascular disease, arthritis.      PAST SURGICAL HISTORY:  Tonsillectomy, bone marrow biopsy, fem-pop bypass.      FAMILY HISTORY:  Cancer and leukemia in mother.      SOCIAL HISTORY:  He is a former smoker, smoked about 2 packs a day for 30 years.  Does not drink any alcohol.      MEDICATIONS:  Reviewed and reconciled.      ALLERGIES:  No known drug allergies.      REVIEW OF SYSTEMS:  All 10 systems were reviewed.  The positives are mentioned above.  In addition, he has lower extremity weakness, but denies fever, chills, nausea, vomiting, headache.  He also has mild constipation.      PHYSICAL  EXAMINATION:   GENERAL:  He is alert and oriented in no acute distress, pleasant gentleman accompanied by his brother and sister-in-law.   VITAL SIGNS:  Blood pressure is 122/64, temperature is 96.1, heart rate is 72.   HEENT:  Atraumatic, normocephalic.  Pupils are equal, round, reactive to light.  EOMI.  Mouth mucosa is moist.   NECK:  Supple.   CHEST:  Clear bilaterally.  No rales or rhonchi.   CARDIOVASCULAR:  S1, S2, no murmurs, rubs, gallops.   ABDOMEN:  Soft, nontender, bowel sounds are positive.   EXTREMITIES:  No edema.   SPINE:  Was not examined.  He has a PICC line in his right arm, the site of which is clean, dry and intact.   NEUROLOGIC:  Cranial nerves II through XII are grossly intact.  Power and tone are equal in the upper extremities.  Lower extremities were not tested.  Gait was not tested.      Thank you for the consultation.         MIGUELITO FALCON MD             D: 02/15/2017 15:59   T: 02/15/2017 16:57   MT:       Name:     PETER BUTLER   MRN:      [id number]        Account:       [id number]   :      1940           Consult Date:  02/15/2017      Document: [id number]       cc: CESAR SALAZAR MD

## 2017-02-15 NOTE — PROGRESS NOTES
SPIRITUAL HEALTH SERVICES  SPIRITUAL ASSESSMENT Progress Note  [facility] 6A     PRIMARY FOCUS:     Goals of care    Support for coping    ILLNESS CIRCUMSTANCES:   Reviewed documentation. Reflective conversation shared with Tad which integrated elements of illness and family narratives.     Context of Serious Illness/Symptom(s) - Tad had spine surgery/ he also suffered infection on his spine.    Resources for Support - Tad reports having connection with his family and community    DISTRESS:     Emotional/Spiritual/Existential Distress - Distress from surgery/infection    Confucianism Distress - Not discussed    Social/Cultural/Economic Distress - Not discussed    SPIRITUAL/Jew COPING:     Druze/Yashira - Patient is Zoroastrianism    Spiritual Practice(s) - Patient declined prayer    Emotional/Relational/Existential Connections - Tad reports connection to his family    GOALS OF CARE:    Goals of Care - Tad was being referred to the Rehabilitation facility on [facility] for further treatment.    Meaning/Sense-Making - Tad hopes to feel well.  PLAN: May not see him again since he will be transferred to Rehab Facility.    Myron Carrasco   Intern  Pager [phone]

## 2017-02-15 NOTE — DISCHARGE SUMMARY
[facility] Discharge Summary and Instructions    Tad Manning MRN# [id number]   Age: 76 year old YOB: 1940     Date of Admission:  2/9/2017  Date of Discharge::  2/15/2017  Admitting Physician:  Marcelo Trent MD  Discharge Physician:  Marcelo Trent MD          Admission Diagnoses:   spinal osteomyolitis w/discitis  spinal compression  Epidural abscess  Thoracic Osteomyelitis/Discitis with Epidural Abscess and Spinal Cord Compression  Epidural Abscess          Discharge Diagnosis:   spinal osteomyolitis w/discitis  spinal compression  Epidural abscess  Thoracic Osteomyelitis/Discitis with Epidural Abscess and Spinal Cord Compression  Epidural Abscess          Procedures:   1. T7-T12 posterior segmental instrumentation. (2/12/17)  2. Transpedicular, transforaminal right-sided T9-T10 interbody fusion. (2/12/17)  3. Posterior arthrodesis T7-T12. (2/12/17)           Brief History of Illness:   Tad Manning is a 76 year old male with CAD, DM with neuropathy, HTN, PVD s/p bilateral LE stents on ASA/plvx, Chronic lymphocytic leukemia in remission who presents after a fall and is found to have infectious spondylitis at T9-10 with paraspinal abscesses, retrolisthesis and epidural inflammation resulting in severe canal stenosis and cord impingement. On 2/9 he underwent T9-10 laminectomy for spinal cord decompression after thoroughly discussing risks and benefits and electing to proceed with stated procedure.            Hospital Course:   Patient underwent above-mentioned procedure on 2/12/17. The operation was uncomplicated and he was admitted to the floor for post-operative cares. On post operative day 1, he was doing well and working with therapies, sitting on the edge of the bed with physical therapy. On post operative day 2, his drain was removed, which had minimal serosanguinous output. Prior to discharge, voiding without a schuler, eating a regular diet, pain was well  controlled. He continued to progress with therapies, to the point of progression where his strength was antigravity in lower extremities and he was able to stand upright. It was recommended he discharge to acute rehabilitation for further rehabilitation cares, and was therefore discharge in stable condition.          Discharge Medications:     Current Discharge Medication List      START taking these medications    Details   acetaminophen (TYLENOL) 325 MG tablet Take 2 tablets (650 mg) by mouth every 4 hours as needed for other (surgical pain)  Qty: 100 tablet, Refills: 0    Associated Diagnoses: Acute midline low back pain, with sciatica presence unspecified      aspirin 325 MG tablet Take 1 tablet (325 mg) by mouth daily  Qty: 120 tablet, Refills: 0    Associated Diagnoses: Acute midline low back pain, with sciatica presence unspecified      cefTRIAXone (ROCEPHIN) 2 GM vial Inject 2 g into the vein every 12 hours  Qty: 5 each, Refills: 0    Associated Diagnoses: Acute midline low back pain, with sciatica presence unspecified      cyclobenzaprine (FLEXERIL) 5 MG tablet Take 1 tablet (5 mg) by mouth every 8 hours as needed for muscle spasms  Qty: 42 tablet, Refills: 0    Associated Diagnoses: Acute midline low back pain, with sciatica presence unspecified         CONTINUE these medications which have CHANGED    Details   oxyCODONE (ROXICODONE) 5 MG IR tablet Take 1-2 tablets (5-10 mg) by mouth every 3 hours as needed for moderate to severe pain  Qty: 100 tablet, Refills: 0    Associated Diagnoses: Acute midline low back pain, with sciatica presence unspecified         CONTINUE these medications which have NOT CHANGED    Details   insulin aspart (NOVOLOG PEN) 100 UNIT/ML injection Inject 1-6 Units Subcutaneous every 4 hours    Associated Diagnoses: Type 2 diabetes mellitus with diabetic polyneuropathy, without long-term current use of insulin (H)      insulin glargine (LANTUS) 100 UNIT/ML injection Inject 5 Units  Subcutaneous At Bedtime    Associated Diagnoses: Type 2 diabetes mellitus with diabetic polyneuropathy, without long-term current use of insulin (H)      lidocaine (LIDODERM) 5 % Patch Place 1 patch onto the skin daily as needed for moderate pain to back for back pain      atenolol (TENORMIN) 25 MG tablet Take 1 tablet (25 mg) by mouth daily  Qty: 30 tablet, Refills: 0    Associated Diagnoses: Essential hypertension      polyethylene glycol (MIRALAX/GLYCOLAX) Packet Take 17 g by mouth daily as needed for constipation  Qty: 7 packet, Refills: 0    Associated Diagnoses: Constipation, unspecified constipation type      senna-docusate (SENOKOT-S;PERICOLACE) 8.6-50 MG per tablet Take 1-2 tablets by mouth 2 times daily as needed (constipation )  Qty: 100 tablet, Refills: 0    Associated Diagnoses: Constipation, unspecified constipation type      bacitracin-polymyxin b (POLYSPORIN) ointment Apply topically 2 times daily  Qty: 15 g, Refills: 0    Associated Diagnoses: Ulcer of heel and midfoot, right, with unspecified severity (H)      order for DME Equipment being ordered: DH2 shoe  Qty: 1 Device, Refills: 0    Associated Diagnoses: 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); Diabetic ulcer of left foot due to type 2 diabetes mellitus (H)      tamsulosin (FLOMAX) 0.4 MG 24 hr capsule Take 0.8 mg by mouth At Bedtime       ZOLPIDEM TARTRATE PO Take 5 mg by mouth nightly as needed       Cholecalciferol (VITAMIN D PO) Take 1,000 Units by mouth daily.       atorvastatin (LIPITOR) 40 MG tablet Take 1 tablet by mouth daily.  Qty: 30 tablet, Refills: 1    Associated Diagnoses: Claudication of left lower extremity (H)      Ascorbic Acid (VITAMIN C PO) Take 500 mg by mouth daily.      Nitroglycerin (NITROQUICK SL) Place 0.4 mg under the tongue as needed.                     Discharge Instructions and Follow-Up:   Discharge diet: Regular   Discharge activity: You may advance activity as tolerated. No strenuous exercise or  heay lifting greater than 10 lbs for 4 weeks or until seen and cleared in clinic.     Discharge follow-up: Follow-up with neurosurgery, Dr. Marcelo Trent MD in 2 weeks for wound check.     Follow-up with Infectious Disease, Dr. Gillian Vila, on 3/23/17. If you have not heard back regarding your appointment time please call [phone] and ask to page the Infectious Disease fellow regarding your pre-planned appointment with Dr. Vila on 3/23/17 to assess antibiotic regimen and progress.     - Check CBC, CRP, creatinine, and LFTs weekly while on therapy and have results faxed to the [facility] at [phone].     - Okay to continue your home aspirin dose now.   - Restart plavix on Monday 2/20/17.   - Increase daily lantus dosage to 12 units daily.   - Continue ceftriaxone 2 g IV q12 h     Wound care: Ok to shower,however no scrubbing of the wound and no soaking of the wound, meaning no bathtubs or swimming pools. Pat dry only. Leave wound open to air.  Sutures are not absorbable and need to be removed in 2 weeks. If patient still at rehab by this time, the sutures may be removed by the rehab physician if he or she considers that the wound has healed completely.     Please call if you have:  1. increased pain, redness, drainage, swelling at your incision  2. fevers > 101.5 F degrees  3. with any questions or concerns.  You may reach the Neurosurgery clinic at [phone] during regular work hours. ER at [phone].    and ask for the Neurosurgery Resident on call at [phone], during off hours or weekends.         Discharge Disposition:   Discharged to acute rehabilitation

## 2017-02-15 NOTE — IP AVS SNAPSHOT
[facility]    [address] [place] [id number]    Phone:  [phone]                                       After Visit Summary   2/15/2017    Tad Manning    MRN: [id number]           After Visit Summary Signature Page     I have received my discharge instructions, and my questions have been answered. I have discussed any challenges I see with this plan with the nurse or doctor.    ..........................................................................................................................................  Patient/Patient Representative Signature      ..........................................................................................................................................  Patient Representative Print Name and Relationship to Patient    ..................................................               ................................................  Date                                            Time    ..........................................................................................................................................  Reviewed by Signature/Title    ...................................................              ..............................................  Date                                                            Time

## 2017-02-15 NOTE — PLAN OF CARE
Problem: Goal Outcome Summary  Goal: Goal Outcome Summary  Outcome: No Change  POD#2 T7-12 posterior fusion. VSS. A&Ox4. Neuros intact ex generalized weakness,BLE 3/5, and occasional confusion. Back incision is covered with marked drainage, no extension. Hemovac in place with scan drainage this shift. Back pain controlled with PRN Oxy, Tylenol, and flexeril. Pt has mepilex on sacrum, CDI. Voiding spontaneously via bedside urinal, no BM. Reposition q2hr. Up with A2 and lift. On CHO diet with good PO intake. MIVF at 10ml/hr. Uses call light appropriately. Will continue to monitor and follow POC.

## 2017-02-15 NOTE — IP AVS SNAPSHOT
MRN:[id number]                      After Visit Summary   2/15/2017    Tad Manning    MRN: [id number]           Thank you!     Thank you for choosing [facility] for your care. Our goal is always to provide you with excellent care. Hearing back from our patients is one way we can continue to improve our services. Please take a few minutes to complete the written survey that you may receive in the mail after you visit with us. Thank you!        Patient Information     Date Of Birth          1940        About your hospital stay     You were admitted on:  February 15, 2017 [facility] the[facility]    You were discharged on:  March 19, 2017        Reason for your hospital stay       Admitted to acute rehab after spinal infection and surgery. Leg weakness has improved but not sufficient to discharge directly home. Requires longer course of rehabilitation and possibly will need alternate housing such as assisted living that's more accessible and help available.                  Who to Call     For medical emergencies, please call 911.  For non-urgent questions about your medical care, please call your primary care provider or clinic, [phone]          Attending Provider     Provider Specialty    Hyun Koehler MD Physical Medicine and Rehab       Primary Care Provider Office Phone # Fax #    Gene Ismael [phone]       [place] [address]        After Care Instructions     Activity - Up with nursing assistance           Advance Diet as Tolerated       Follow this diet upon discharge: diabetic [id number] Calories: Moderate Consistent CHO (4-6 CHO units/meal);   Ensure Clear apple with breakfast, Ensure Plus Vanilla with lunch and Strawberry with supper.  Regular consistency / thin liquids.            Daily weights       Call Provider for weight gain of more than 2 pounds per day or 5 pounds per week.             Encourage PO fluids           General info for SNF       Length of Stay Estimate: Short Term Care: Estimated # of Days <30  Condition at Discharge: Improving  Level of care:skilled   Rehabilitation Potential: Fair  Admission H&P remains valid and up-to-date: Yes  Recent Chemotherapy: N/A  Use Nursing Home Standing Orders: Yes            Glucose monitor nursing POCT       Twice daily            Mantoux instructions       Give two-step Mantoux (PPD) Per Facility Policy Yes            Wound care (specify)       Skin care plan for Perinuem: Daily and as needed cleanse with Mahsa Cleanse and Protect daily and with each episode of incontinence. Apply a thin layer of Criticaid paste around rectum- Do Not Apply to Buttocks. Apply Sween 24 to dry, peeling skin on sacrum and buttocks. Apply twice daily.  Plan of care for right buttocks: Cleanse with Mahsa Cleanse and Protect every other day. Coat with [place]alon No Sting swab. Apply mepilex over open wound. Remind pt to lift buttocks to reposition, try not to slide.   Feet :Daily Apply Sween 24 lotion to callous and dry skin                  Your next 10 appointments already scheduled     Mar 23, 2017  8:00 AM CDT   (Arrive by 7:45 AM)   Return Visit with Gillian Max MD   [facility] ([facility])    [address] [place] [id number] [phone]            May 01, 2017 11:00 AM CDT   (Arrive by 10:45 AM)   Return Visit with Clarisse Valdes MD   [facility] ([facility])    [address] [place] [id number] [phone]              Additional Services     Nutrition Services Adult IP Consult       Reason: optimal nutrition, supplements, encourage protein intake.            Occupational Therapy Adult Consult       Evaluate and treat as clinically indicated.            Physical Therapy Adult Consult       Evaluate and treat  "as clinically indicated                  Future tests that were ordered for you     AntiEmbolism Stockings       Bilateral below knee length.On in the morning, off at night                  Further instructions from your care team       TCU provider while there. Should have weekly CBC, CRP and BMP for 3 more weeks to assure infection and electrolyte stability.    Follow-Up Appointments:    -- Follow up with ID and cardiology as scheduled 3/23 and 5/1. Exploring if the ID appointment could be postponed or decisions made with labs alone. Message pending with Dr. Max.    -- Neurosurgery Dr. Francisco J Trent, f/u surgery in 3-4 weeks - needs scheduling.    -- Dr. Hyun Keohler in [facility] after TCU discharge as needed for additional rehabilitation needs if not addressed by TCU therapy team.  Primary provider after TCU discharge.    Pending Results     No orders found from 2/13/2017 to 2/16/2017.            Statement of Approval     Ordered          03/19/17 1129  I have reviewed and agree with all the recommendations and orders detailed in this document.  EFFECTIVE NOW     Approved and electronically signed by:  Dale Leroy MD             Admission Information     Date & Time Provider Department Dept. Phone    2/15/2017 Hyun Koehler MD  ACUTE REHAB [phone]      Your Vitals Were     Blood Pressure Pulse Temperature Respirations Height Weight    151/81 (BP Location: Left arm) 103 96.7  F (35.9  C) (Oral) 16 1.829 m (6') 88.9 kg (196 lb)    Pulse Oximetry BMI (Body Mass Index)                97% 26.58 kg/m2          [employer] Information     [employer] lets you send messages to your doctor, view your test results, renew your prescriptions, schedule appointments and more. To sign up, go to [email or web address] . Click on \"Log in\" on the left side of the screen, which will take you to the Welcome page. Then click on \"Sign up Now\" on the right side of the page.     You will be asked to enter " the access code listed below, as well as some personal information. Please follow the directions to create your username and password.     Your access code is: [email or web address]  Expires: 2017  5:04 PM     Your access code will  in 90 days. If you need help or a new code, please call your [place] clinic or [phone].        Care EveryWhere ID     This is your Care EveryWhere ID. This could be used by other organizations to access your [place] medical records  [id number]           Review of your medicines      START taking        Dose / Directions    clopidogrel 75 MG tablet   Commonly known as:  PLAVIX        Dose:  75 mg   Take 1 tablet (75 mg) by mouth daily   Quantity:  30 tablet   Refills:  0       furosemide 40 MG tablet   Commonly known as:  LASIX   Used for:  Acute systolic heart failure (H)        Dose:  40 mg   Take 1 tablet (40 mg) by mouth daily   Quantity:  30 tablet   Refills:  0       lisinopril 10 MG tablet   Commonly known as:  PRINIVIL/ZESTRIL   Used for:  Essential hypertension        Dose:  10 mg   Take 1 tablet (10 mg) by mouth daily   Quantity:  30 tablet   Refills:  0       melatonin 3 MG tablet   Used for:  Primary insomnia        Dose:  3 mg   Take 1 tablet (3 mg) by mouth nightly as needed   Refills:  0       metFORMIN 500 MG tablet   Commonly known as:  GLUCOPHAGE   Used for:  Type 2 diabetes mellitus with diabetic polyneuropathy, without long-term current use of insulin (H)        Dose:  500 mg   Take 1 tablet (500 mg) by mouth 2 times daily (with meals)   Quantity:  60 tablet   Refills:  0       metoprolol 25 MG tablet   Commonly known as:  LOPRESSOR        Dose:  75 mg   Take 3 tablets (75 mg) by mouth 2 times daily   Refills:  0       mirtazapine 15 MG tablet   Commonly known as:  REMERON   Used for:  Primary insomnia        Dose:  15 mg   Take 1 tablet (15 mg) by mouth At Bedtime   Quantity:  60 tablet   Refills:  0       potassium chloride SA 20 MEQ CR tablet   Commonly  known as:  K-DUR/KLOR-CON M   Used for:  Loop diuretic causing adverse effect in therapeutic use, subsequent encounter        Dose:  20 mEq   Take 1 tablet (20 mEq) by mouth daily   Quantity:  90 tablet   Refills:  0       ranitidine 150 MG tablet   Commonly known as:  ZANTAC   Used for:  Gastroesophageal reflux disease without esophagitis        Dose:  150 mg   Take 1 tablet (150 mg) by mouth 2 times daily   Quantity:  60 tablet   Refills:  0         CONTINUE these medicines which may have CHANGED, or have new prescriptions. If we are uncertain of the size of tablets/capsules you have at home, strength may be listed as something that might have changed.        Dose / Directions    insulin glargine 100 UNIT/ML injection   Commonly known as:  LANTUS   This may have changed:  how much to take   Used for:  Epidural abscess        Dose:  22 Units   Inject 22 Units Subcutaneous every morning (before breakfast)   Quantity:  30 mL   Refills:  0         CONTINUE these medicines which have NOT CHANGED        Dose / Directions    acetaminophen 325 MG tablet   Commonly known as:  TYLENOL   Used for:  Acute midline low back pain, with sciatica presence unspecified        Dose:  650 mg   Take 2 tablets (650 mg) by mouth every 4 hours as needed for other (surgical pain)   Quantity:  100 tablet   Refills:  0       aspirin 325 MG tablet   Used for:  Acute midline low back pain, with sciatica presence unspecified        Dose:  325 mg   Take 1 tablet (325 mg) by mouth daily   Quantity:  120 tablet   Refills:  0       atorvastatin 40 MG tablet   Commonly known as:  LIPITOR   Used for:  Claudication of left lower extremity (H)        Dose:  40 mg   Take 1 tablet by mouth daily.   Quantity:  30 tablet   Refills:  1       cefTRIAXone 2 GM vial   Commonly known as:  ROCEPHIN   Indication:  Bone and Joint Infection, Epidural abscess   Used for:  Acute midline low back pain, with sciatica presence unspecified        Dose:  2 g   Inject 2 g  into the vein every 12 hours for 11 doses   Quantity:  5 each   Refills:  0       NITROQUICK SL        Dose:  0.4 mg   Place 0.4 mg under the tongue as needed.   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), Diabetic ulcer of left foot due to type 2 diabetes mellitus (H)        Equipment being ordered: DH2 shoe   Quantity:  1 Device   Refills:  0       polyethylene glycol Packet   Commonly known as:  MIRALAX/GLYCOLAX   Used for:  Constipation, unspecified constipation type        Dose:  17 g   Take 17 g by mouth daily as needed for constipation   Quantity:  7 packet   Refills:  0       senna-docusate 8.6-50 MG per tablet   Commonly known as:  SENOKOT-S;PERICOLACE   Used for:  Constipation, unspecified constipation type        Dose:  1-2 tablet   Take 1-2 tablets by mouth 2 times daily as needed (constipation )   Quantity:  100 tablet   Refills:  0       tamsulosin 0.4 MG capsule   Commonly known as:  FLOMAX        Dose:  0.8 mg   Take 0.8 mg by mouth At Bedtime   Refills:  0       VITAMIN C PO        Dose:  500 mg   Take 500 mg by mouth daily.   Refills:  0       VITAMIN D PO        Dose:  1000 Units   Take 1,000 Units by mouth daily.   Refills:  0         STOP taking     atenolol 25 MG tablet   Commonly known as:  TENORMIN           bacitracin-polymyxin b ointment   Commonly known as:  POLYSPORIN           cyclobenzaprine 5 MG tablet   Commonly known as:  FLEXERIL           insulin aspart 100 UNIT/ML injection   Commonly known as:  NovoLOG PEN           lidocaine 5 % Patch   Commonly known as:  LIDODERM           oxyCODONE 5 MG IR tablet   Commonly known as:  ROXICODONE           ZOLPIDEM TARTRATE PO                Where to get your medicines      Some of these will need a paper prescription and others can be bought over the counter. Ask your nurse if you have questions.     You don't need a prescription for these medications     cefTRIAXone 2 GM vial     clopidogrel 75 MG tablet    furosemide 40 MG tablet    insulin glargine 100 UNIT/ML injection    lisinopril 10 MG tablet    melatonin 3 MG tablet    metFORMIN 500 MG tablet    metoprolol 25 MG tablet    mirtazapine 15 MG tablet    potassium chloride SA 20 MEQ CR tablet    ranitidine 150 MG tablet                Protect others around you: Learn how to safely use, store and throw away your medicines at www.disposemymeds.org.             Medication List: This is a list of all your medications and when to take them. Check marks below indicate your daily home schedule. Keep this list as a reference.      Medications           Morning Afternoon Evening Bedtime As Needed    acetaminophen 325 MG tablet   Commonly known as:  TYLENOL   Take 2 tablets (650 mg) by mouth every 4 hours as needed for other (surgical pain)   Last time this was given:  650 mg on 3/7/2017  8:03 PM                                aspirin 325 MG tablet   Take 1 tablet (325 mg) by mouth daily   Last time this was given:  325 mg on 3/19/2017  8:35 AM                                atorvastatin 40 MG tablet   Commonly known as:  LIPITOR   Take 1 tablet by mouth daily.   Last time this was given:  40 mg on 3/19/2017  8:30 AM                                cefTRIAXone 2 GM vial   Commonly known as:  ROCEPHIN   Inject 2 g into the vein every 12 hours for 11 doses   Last time this was given:  2 g on 3/19/2017  8:25 AM                                clopidogrel 75 MG tablet   Commonly known as:  PLAVIX   Take 1 tablet (75 mg) by mouth daily   Last time this was given:  75 mg on 3/19/2017  8:35 AM                                furosemide 40 MG tablet   Commonly known as:  LASIX   Take 1 tablet (40 mg) by mouth daily   Last time this was given:  40 mg on 3/19/2017  8:30 AM                                insulin glargine 100 UNIT/ML injection   Commonly known as:  LANTUS   Inject 22 Units Subcutaneous every morning (before breakfast)   Last time this was given:   22 Units on 3/19/2017  8:29 AM                                lisinopril 10 MG tablet   Commonly known as:  PRINIVIL/ZESTRIL   Take 1 tablet (10 mg) by mouth daily   Last time this was given:  10 mg on 3/19/2017  8:32 AM                                melatonin 3 MG tablet   Take 1 tablet (3 mg) by mouth nightly as needed   Last time this was given:  3 mg on 3/18/2017  9:52 PM                                metFORMIN 500 MG tablet   Commonly known as:  GLUCOPHAGE   Take 1 tablet (500 mg) by mouth 2 times daily (with meals)   Last time this was given:  500 mg on 3/19/2017  8:30 AM                                metoprolol 25 MG tablet   Commonly known as:  LOPRESSOR   Take 3 tablets (75 mg) by mouth 2 times daily   Last time this was given:  75 mg on 3/19/2017  8:30 AM                                mirtazapine 15 MG tablet   Commonly known as:  REMERON   Take 1 tablet (15 mg) by mouth At Bedtime   Last time this was given:  15 mg on 3/18/2017  8:14 PM                                NITROQUICK SL   Place 0.4 mg under the tongue as needed.                                order for DME   Equipment being ordered: DH2 shoe                                polyethylene glycol Packet   Commonly known as:  MIRALAX/GLYCOLAX   Take 17 g by mouth daily as needed for constipation   Last time this was given:  17 g on 2/18/2017  9:17 AM                                potassium chloride SA 20 MEQ CR tablet   Commonly known as:  K-DUR/KLOR-CON M   Take 1 tablet (20 mEq) by mouth daily   Last time this was given:  20 mEq on 3/19/2017  8:34 AM                                ranitidine 150 MG tablet   Commonly known as:  ZANTAC   Take 1 tablet (150 mg) by mouth 2 times daily   Last time this was given:  150 mg on 3/19/2017  8:30 AM                                senna-docusate 8.6-50 MG per tablet   Commonly known as:  SENOKOT-S;PERICOLACE   Take 1-2 tablets by mouth 2 times daily as needed (constipation )   Last time this was given:  1  tablet on 2/18/2017  9:17 PM                                tamsulosin 0.4 MG capsule   Commonly known as:  FLOMAX   Take 0.8 mg by mouth At Bedtime   Last time this was given:  0.8 mg on 3/18/2017  8:15 PM                                VITAMIN C PO   Take 500 mg by mouth daily.   Last time this was given:  500 mg on 3/19/2017  8:30 AM                                VITAMIN D PO   Take 1,000 Units by mouth daily.   Last time this was given:  1,000 Units on 3/19/2017  8:30 AM

## 2017-02-15 NOTE — IP AVS SNAPSHOT
` `     [facility]: [id number]                                              INTERAGENCY TRANSFER FORM - NURSING   2/15/2017                    Hospital Admission Date: 2/15/2017  PETER BUTLER   : 1940  Sex: Male        Attending Provider: Hyun Koehler MD     Allergies:  Blood Transfusion Related (Informational Only)    Infection:  None   Service:  ACUTE IP ANGELIQUE    Ht:  1.829 m (6')   Wt:  88.9 kg (196 lb)   Admission Wt:  90.3 kg (199 lb 1.6 oz)    BMI:  26.58 kg/m 2   BSA:  2.13 m 2            Patient PCP Information     Provider PCP Type    Gene Lanier      Current Code Status     Date Active Code Status Order ID Comments User Context       Prior      Code Status History     Date Active Date Inactive Code Status Order ID Comments User Context    3/18/2017  9:48 PM  Full Code [id number]  Dale Leroy MD Outpatient    2/15/2017  3:30 PM 3/18/2017  9:48 PM Full Code [id number]  Hyun Koehler MD Inpatient    2/15/2017 11:43 AM 2/15/2017  3:30 PM Full Code [id number]  Jeaneth Mesa MD Outpatient    2017 10:43 PM 2/15/2017 11:43 AM Full Code [id number]  Ayse Scott PA-C Outpatient    2017  9:45 PM 2017 10:43 PM Full Code [id number]  Moody Beckford MD ED    2017  3:32 PM 2017  9:45 PM Full Code [id number]  Patria Dee MD Outpatient    2017  6:43 PM 2017  3:32 PM Full Code [id number]  Kalia Barrientos DO Inpatient    1/10/2014  5:40 PM 2014  5:20 PM Full Code [id number]  Kiya Betts RN Inpatient      Advance Directives        Does patient have a scanned Advance Directive/ACP document in EPIC?           No        Hospital Problems as of 3/19/2017              Priority Class Noted POA    Osteomyelitis of thoracic spine (H) Medium  2/15/2017 Yes    Atrial flutter (H) Medium  2017 Unknown      Non-Hospital Problems as of 3/19/2017              Priority Class Noted    Critical lower limb ischemia , s/p L AK pop to  BK pop bypass with rGSV on 1/10/2014   1/10/2014    Diabetes mellitus, type 2 (H) Medium  1/11/2014    HTN (hypertension) Medium  1/11/2014    CAD (coronary artery disease) Medium  1/11/2014    Hyperlipidemia with target LDL less than 100 Medium  1/11/2014    Osteomyelitis left 2nd Toe s/p amputaion 1/10/14 Medium  1/11/2014    Renal insufficiency Medium  1/20/2014    Renal failure Medium  1/20/2017    Fall Medium  2/7/2017    Discitis Medium  2/8/2017    Epidural abscess Medium  2/9/2017      Immunizations     None         END      ASSESSMENT     Discharge Profile Flowsheet     EXPECTED DISCHARGE     COMMUNICATION ASSESSMENT      Expected Discharge Date  03/19/17 (to TCU) 03/17/17 2044   Patient's communication style  spoken language (English or Bilingual) 02/15/17 1715    DISCHARGE NEEDS ASSESSMENT     SKIN      Patient/family verbalizes understanding of discharge plan recommendations?  Yes 02/17/17 1421   Inspection  Partial (identify areas NOT inspected) 03/19/17 0139    Medical Team notified of plan?  yes 02/17/17 1421   Skin areas NOT inspected  Occiput;Scapula, left;Scapula, right 03/18/17 1725    Readmission Within The Last 30 Days  no previous admission in last 30 days 02/17/17 1421   Skin WDL  ex 03/19/17 0139    Equipment Currently Used at Home  walker, rolling 02/17/17 1421   Skin Color/Characteristics  without discoloration 03/18/17 1725    Transportation Available  car;family or friend will provide 02/17/17 1421   Skin Temperature  warm 03/18/17 1725    Equipment Used at Home  none 01/11/14 1359   Skin Moisture  dry 03/18/17 1725    GASTROINTESTINAL (ADULT,PEDIATRIC,OB)     Skin Integrity  wound(s) 03/19/17 0139    GI WDL  (P)  -- (very sm incont) 03/19/17 1128   SAFETY      Last Bowel Movement  (P)  03/19/17 03/19/17 1128   Safety WDL  WDL 03/19/17 0139    GI Signs/Symptoms  other (see comments) (Occasional bowel incontinence) 03/15/17 1443   Safety Factors  bed in low position;wheels locked;call  "light in reach;upper side rails raised x 2;ID band on 03/19/17 0139    Passing flatus  yes 03/18/17 1725                      Assessment WDL (Within Defined Limits) Definitions           Safety WDL     Effective: 09/28/15    Row Information: <b>WDL Definition:</b> Bed in low position, wheels locked; call light in reach; upper side rails up x 2; ID band on<br> <font color=\"gray\"><i>Item=AS safety wdl>>List=AS safety wdl>>Version=F14</i></font>      Skin WDL     Effective: 09/28/15    Row Information: <b>WDL Definition:</b> Warm; dry; intact; elastic; without discoloration; pressure points without redness<br> <font color=\"gray\"><i>Item=AS skin wdl>>List=AS skin wdl>>Version=F14</i></font>      Vitals     Vital Signs Flowsheet     COMMENTS     CLINICALLY ALIGNED PAIN ASSESSMENT (CAPA) ([facility], [facility] AND [facility] ADULTS ONLY)      Comments  OT session: 03/18/17 1345   Comfort  comfortably manageable 03/02/17 2350    VITAL SIGNS     Change in Pain  about the same 03/02/17 2350    Temp  96.7  F (35.9  C) 03/19/17 0733   Pain Control  partially effective 03/02/17 2350    Temp src  Oral 03/19/17 0733   Functioning  can do most things, but pain gets in the way of some 03/02/17 2350    Resp  16 03/19/17 0733   Sleep  awake with occasional pain 03/02/17 2350    Pulse  103 03/19/17 0733   ANALGESIA SIDE EFFECTS MONITORING      Heart Rate  110 03/19/17 0733   Side Effects Monitoring: Respiratory Quality  R 03/01/17 2316    Pulse/Heart Rate Source  Monitor 03/19/17 0733   Side Effects Monitoring: Respiratory Depth  N 03/01/17 2316    BP  151/81 03/19/17 0733   Side Effects Monitoring: Sedation Level  1 03/01/17 2316    BP Location  Left arm 03/19/17 0733   HEIGHT AND WEIGHT      LYING ORTHOSTATIC BP     Height  1.829 m (6') 02/16/17 0840    Lying Orthostatic BP  127/69 03/16/17 1042   Weight  88.9 kg (196 lb) (bed weight ) 03/17/17 0557    Lying Orthostatic Pulse  91 bpm 03/16/17 1042   BSA (Calculated - sq m)  2.14 02/16/17 0840 "    SITTING ORTHOSTATIC BP     BMI (Calculated)  27.06 02/16/17 0840    Sitting Orthostatic BP  131/61 03/16/17 1042   POSITIONING      Sitting Orthostatic Pulse  92 bpm 03/16/17 1042   Body Position  supine 03/18/17 0511    OXYGEN THERAPY     Head of Bed (HOB)  HOB at 20 degrees 03/19/17 0139    SpO2  97 % 03/19/17 0733   Positioning/Transfer Devices  pillows;in use 03/18/17 1725    O2 Device  None (Room air) 03/19/17 0733   Chair  Upright in chair 03/18/17 1725    Oxygen Delivery  2 LPM 03/16/17 0948   DAILY CARE      PAIN/COMFORT     Activity Type  bedrest 03/19/17 0139    Patient Currently in Pain  denies 03/19/17 0835   Activity Level of Assistance  assistance, 1 person 03/18/17 1725    Preferred Pain Scale  CAPA (Clinically Aligned Pain Assessment) ([facility] Adults Only) 03/16/17 1042   Activity Assistive Device  gait belt (sliding board and wheelchair) 03/18/17 1725    Pain Location  Back 03/02/17 0053   STANDING ORTHOSTATIC BP      Pain Orientation  Lower 03/02/17 0053   Standing Orthostatic BP  -- (unable to stand) 03/14/17 1303    Pain Descriptors  Aching;Discomfort 03/02/17 0053   Standing Orthostatic Pulse  73 bpm 02/19/17 1341    Pain Management Interventions  analgesia administered 02/20/17 0701   POINT OF CARE TESTING      Pain Intervention(s)  Heat applied;Repositioned 03/02/17 0053   Puncture Site  fingertip 02/21/17 0735    Response to Interventions  Decrease in pain 03/01/17 2316   Bedside Glucose (mg/dl )   168 mg/dl 02/21/17 0735            Patient Lines/Drains/Airways Status    Active LINES/DRAINS/AIRWAYS     Name: Placement date: Placement time: Site: Days: Last dressing change:    Closed/Suction Drain 1 Back 10 Amharic 02/12/17   1105   Back   35     Wound 01/20/17 Right Heel Other (comment) 01/20/17   1900   Heel   57     Wound 02/09/17 Posterior Coccyx Suspected pressure ulcer nonblanchable redness 02/09/17   0100   Coccyx   38     Wound 02/15/17 Mid Coccyx Suspected  pressure ulcer non blanchable open area 02/15/17   2017   Coccyx   31     Wound 02/19/17 Right Buttocks open area on right buttock 02/19/17   1430   Buttocks   27     Wound 03/08/17 Lateral;Right Foot 2 scabbed areas on plantar area on smallest toe and below that. 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 03/08/17   0859   Foot   11     Rash 03/14/17 2000 Bilateral anterior groin other (see comments) 03/14/17 2000    4     Incision/Surgical Site 02/01/16 Neck 02/01/16   1333    411     Incision/Surgical Site 02/01/16 Right Chest 02/01/16   1350    411     Incision/Surgical Site 02/09/17 Midline;Mid Back 02/09/17   0341    38     Incision/Surgical Site 02/09/17 Bilateral Back 02/09/17   0354    38     Incision/Surgical Site 02/12/17 Mid Back 02/12/17   1125    35     Incision/Surgical Site 02/15/17 Mid Back 02/15/17   2016    31             Patient Lines/Drains/Airways Status    Active PICC/CVC     Name: Placement date: Placement time: Site: Days: Additional Info Last dressing change:    PICC Double Lumen 02/10/17 Right Basilic 02/10/17   1217   Basilic   36 Internal Lumen Volume (mL): 44 mL   03/01/17 2145 (421.97 hrs)         External Cath Length (cm): 3 cm            Size (Fr): 5 Fr            Orientation: Right            Extremity Circumference (cm): 32 cm            Catheter Brand: [employer] Power Picc            Dressing Intervention: Chlorhexidine patch;Transparent;Securing device            Description: Power PICC            Total Catheter Length (cm) Trimmed: 47 cm            Site Prep: Chlorhexidine            Local Anesthetic: Injectable            Inserted by: Lela Marquez RN            Insertion attempts without ultrasound: 1            Patient Tolerance: Tolerated well            Placement Verification: Xray;Blood Return            Difficulty with threading line: No            Tip location: SVC/RA Junction            Full barrier precautions done: Yes            Consent Signed: Yes            Time Out performed:  Yes            Lot #: [id number]            Use for : Medication Administration               Intake/Output Detail Report     Date Intake       Output   Net    Shift P.O. I.V. IV Piggyback Blood Components Total Urine Blood Total       Day 03/18/17 0000 - 03/18/17 0659 -- -- -- -- -- 125 -- 125 -125    Ashwini 03/18/17 0700 - 03/18/17 1459 -- -- -- -- -- 200 -- 200 -200    Noc 03/18/17 1500 - 03/18/17 2359 -- -- -- -- -- 700 -- 700 -700    Day 03/19/17 0000 - 03/19/17 0659 -- -- -- -- -- 100 -- 100 -100    Ashwini 03/19/17 0700 - 03/19/17 1459 -- -- -- -- -- 150 -- 150 -150      Last Void/BM       Most Recent Value    Urine Occurrence 1 at 03/17/2017 1953    Stool Occurrence 1 at 03/17/2017 1100      Case Management/Discharge Planning     Case Management/Discharge Planning Flowsheet     REFERRAL INFORMATION     EXPECTED DISCHARGE      Admission Type  inpatient 02/17/17 1421   Expected Discharge Date  03/19/17 (to TCU) 03/17/17 2044    Arrived From  admitted as an inpatient 02/17/17 1421   DISCHARGE PLANNING      Referral Source  admission list 02/17/17 1421   Patient/family verbalizes understanding of discharge plan recommendations?  Yes 02/17/17 1421    New Steerage to  Clinics?  No 02/17/17 1421   Medical Team notified of plan?  yes 02/17/17 1421    Reason For Consult  discharge planning 02/17/17 1421   Readmission Within The Last 30 Days  no previous admission in last 30 days 02/17/17 1421    Record Reviewed  clinical discipline documentation;history and physical;medical record;patient profile;plan of care 02/17/17 1421   Transportation Available  car;family or friend will provide 02/17/17 1421    LIVING ENVIRONMENT     Equipment Used at Home  none 01/11/14 1359    Lives With  significant other 02/17/17 1421   FINAL RESOURCES      Living Arrangements  apartment 02/17/17 1421   Equipment Currently Used at Home  walker, rolling 02/17/17 1421    Provides Primary Care For  other (see comments) (self) 01/13/14 1435   ABUSE  RISK SCREEN      Able to Return to Prior Living Arrangements  yes 02/17/17 1421   QUESTION TO PATIENT:  Has a member of your family or a partner(now or in the past) intimidated, hurt, manipulated, or controlled you in any way?  no 02/15/17 1715    HOME SAFETY     QUESTION TO PATIENT: Do you feel safe going back to the place where you are living?  yes 02/15/17 1715    Patient Feels Safe Living in Home?  yes 02/17/17 1421   OBSERVATION: Is there reason to believe there has been maltreatment of a vulnerable adult (ie. Physical/Sexual/Emotional abuse, self neglect, lack of adequate food, shelter, medical care, or financial exploitation)?  no 02/15/17 1715    COPING/STRESS     (R) MENTAL HEALTH SUICIDE RISK      Major Change/Loss/Stressor  none 02/15/17 1715   Are you depressed or being treated for depression?  No 02/15/17 2305    Patient Personal Strengths  assertive;humor;motivated;positive attitude;strong support system 01/13/14 7442

## 2017-02-15 NOTE — PROGRESS NOTES
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 On 2/9 he underwent T9-10 laminectomy for spinal cord decompression. He is improving post operatively. Blood cultures are growing strep mitis sensitive to ceftriaxone.     On 2/13 he underwent T7-12 posterior fusion with TLIF T9-10.   - drain dc'd  - dc TLSO  - restart ASA today, DVT Thursday, plavix on DC   - cultures growing strep Mitis - continue ceftriaxone and follow up with ID; PICC in place   - PT/OT - ARU   - SCDs                  Interval History:   Improving motor function              Review of Systems:   The Review of Systems is otherwise negative beyond that which has been previously stated.          Medications:   I have reviewed this patient's current medications.    Current Facility-Administered Medications   Medication Dose Route Frequency     aspirin  325 mg Oral Daily     insulin glargine  12 Units Subcutaneous QAM AC     pantoprazole  40 mg Oral QAM     cefTRIAXone  2 g Intravenous Q12H     polyethylene glycol  17 g Oral Daily     atenolol  25 mg Oral Daily     heparin lock flush  5-10 mL Intracatheter Q24H     bisacodyl  10 mg Rectal Daily     atorvastatin  40 mg Oral Daily     tamsulosin  0.8 mg Oral At Bedtime     sodium chloride (PF)  3 mL Intracatheter Q8H     insulin aspart  1-10 Units Subcutaneous TID AC     insulin aspart  1-7 Units Subcutaneous At Bedtime     senna-docusate  1-2 tablet Oral BID        Physical Exam:  General:   Comfortable respiratory effort; normal cardiac rhythm.     Mental Status:    awake, alert and oriented  x3.    fluent, communicative, intact comprehension.    Cranial Nerves:  equal and reactive pupils    extraocular movements preserved    no dysarthria    Motor/Sensory:  2/5 throughout the right lower extremity; left lower extremity - 4/5 plantar flexion, 3/5 dorsi flexion and EHL, otherwise 3/5 at the knee and hip.   No deficits to pain, soft touch.    Intact perianal sensation      Reflexes: hypoactive in the lower extremities         Data:   The labs and imaging for this patient have been reviewed directly.

## 2017-02-15 NOTE — H&P
[facility]   Acute Rehabilitation Unit  Admission History and Physical    chief complaint   Weakness of bilateral lower extremities    History of Present illness  Tad Manning is a 76 year old right hand dominant male with history of DM with neuropathy, CAD, PVD with bilateral LE stents and CLL in remission who is transferred from [facility] following surgical intervention for epidural abscess and infectious spondylitis. He was originally seen at [facility] on 2/7 following a fall at his home. This was the most recent in a series of increasingly frequent falls that were associated with bilateral leg weakness and lower back pain. 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 On 2/9 the patient was transferred to the [facility] for urgent surgical decompression of the spinal cord. Immediately prior to the operation, the patient was unable to move his right LE and had minimal movement of his left LE. On admission to acute rehab, the patient has some improvement in symptoms as he is able to move both of his legs but still has significant weakness below his baseline. Reports pain around the incision and surgery site which is well managed with pain meds.     He was seen and followed by ID team during his hospital stay; current on IV rocephin via right PICC line with the plan to resume the same regimen for total of 6 weeks till 3/24. He had TLSO initially after surgery which was discontinued; ok to use for comfort if needed.     During his acute hospitalization, patient was seen and evaluated by PT, and OT.  All specialties collectively recommended that patient would benefit from ongoing therapies in the acute inpatient rehabilitation setting. In review of the therapy notes,     PT: Pt mod A x 2 for  bed mobility utilizing logroll technique. Pt w/ decreased sitting balance, demo increased posterior lean requiring min A to maintain upright posture, VC/TC provided however minimal improvement noted. Pt performed 3x STS w/ Mod A x 2, cont to require B knee blocking. Tolerated standing ~15 seconds. Pt dependent sling to chair. Instructed in seated BLE strengthening exercises     OT: Pt unable to recall PT education on surgical precautions; writer provided additional education on spinal precautions, impact on ADLs/activity, and compensatory strategies for tasks. Pt also issued handout for reinforcement of learning. Pt able to transfer up to EOB with mod A and VCs; pt able to tolerate sitting EOB ~10 min with CGA-min A to complete ADLs.      Currently, the patient is medically appropriate and is assessed to have needs and will benefit from an inpatient acute rehabilitation comprehensive program working with Pt and OT, and will also benefit from supervision and management of Rehab Nursing and Rehab MD.       PAST Medical History  Past Medical History   Diagnosis Date     Arthritis      ASCVD (arteriosclerotic cardiovascular disease)      BMI 30.0-30.9,adult      BPH (benign prostatic hypertrophy)      Cellulitis      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)      Coronary artery disease      triple bypass 1995     Diabetes mellitus (H)      Diabetic polyneuropathy (H)      History of blood transfusion      Hyperlipidaemia      Hypertension      Malignant neoplasm (H)      CLL     Noninflammatory pericardial effusion      Osteomyelitis of left foot (H)      PVD (peripheral vascular disease) (H)      Sebaceous cyst        Surgical History  Past Surgical History   Procedure Laterality Date     Tonsillectomy       Bone marrow biopsy, bone specimen, needle/trocar  10/19/2012     Procedure: BIOPSY BONE MARROW;  BONE MARROW BIOPSY  (CONSCIOUS SED);  Surgeon: Ramon Quesada MD;   Location:  GI     Vascular surgery       ptcr legs     Bypass graft femoropopliteal  1/10/2014     Procedure: BYPASS GRAFT FEMOROPOPLITEAL;  Left above knee popliteal to  Below knee popliteal bypass using transverse saphenous vein graft. Left 2nd Toe amputation;  Surgeon: Amador Vick MD;  Location:  OR     Amputate toe(s)  1/10/2014     Procedure: AMPUTATE TOE(S);;  Surgeon: Amador Vick MD;  Location:  OR     Graft vein from extremity (location)  1/10/2014     Procedure: GRAFT VEIN FROM EXTREMITY (LOCATION);;  Surgeon: Amador Vick MD;  Location:  OR     Cardiac surgery       angioplasty with stent, triple bypass     Excise cyst generic (location) N/A 2/1/2016     Procedure: EXCISE CYST GENERIC (LOCATION);  Surgeon: Amador Vick MD;  Location:  [facility]     Laminectomy thoracic one level N/A 2/8/2017     Procedure: LAMINECTOMY THORACIC ONE LEVEL;  Surgeon: Marcelo Trent MD;  Location: [facility] OR     Optical tracking system fusion posterior spine thoracic three+ levels N/A 2/12/2017     Procedure: OPTICAL TRACKING SYSTEM FUSION POSTERIOR SPINE THORACIC THREE+ LEVELS;  Surgeon: Marcelo Trent MD;  Location: [facility] OR       SOCIAL HISTORY  Marital Status: single   Living situation: lives with his SO in an apartment with a small threshold to enter the building   Tobacco use: 2 ppd for 30 years; quit 1995  Alcohol use: occasional       FAMILY HISTORY  Family History   Problem Relation Age of Onset     CANCER Mother      leukemia         Prior Functional history   Pt was independent with all ADLs/IADLs, transfers, mobility and gait. Right hand-dominant; does not drive.     Medications  Scheduled meds  Prescriptions Prior to Admission   Medication Sig Dispense Refill Last Dose     insulin aspart (NOVOLOG PEN) 100 UNIT/ML injection Inject 1-6 Units Subcutaneous every 4 hours        [DISCONTINUED] insulin glargine (LANTUS) 100 UNIT/ML injection Inject 5 Units Subcutaneous At Bedtime         lidocaine (LIDODERM) 5 % Patch Place 1 patch onto the skin daily as needed for moderate pain to back for back pain        atenolol (TENORMIN) 25 MG tablet Take 1 tablet (25 mg) by mouth daily 30 tablet 0 2/6/2017 at Unknown time     polyethylene glycol (MIRALAX/GLYCOLAX) Packet Take 17 g by mouth daily as needed for constipation 7 packet 0  at PRN     senna-docusate (SENOKOT-S;PERICOLACE) 8.6-50 MG per tablet Take 1-2 tablets by mouth 2 times daily as needed (constipation ) 100 tablet 0  at PRN     bacitracin-polymyxin b (POLYSPORIN) ointment Apply topically 2 times daily 15 g 0 Past Week at Unknown time     order for DME Equipment being ordered: DH2 shoe 1 Device 0 Taking     tamsulosin (FLOMAX) 0.4 MG 24 hr capsule Take 0.8 mg by mouth At Bedtime    2/6/2017 at HS     ZOLPIDEM TARTRATE PO Take 5 mg by mouth nightly as needed    Past Month at Unknown time     Cholecalciferol (VITAMIN D PO) Take 1,000 Units by mouth daily.    2/6/2017 at Unknown time     atorvastatin (LIPITOR) 40 MG tablet Take 1 tablet by mouth daily. 30 tablet 1 2/6/2017 at Unknown time     Ascorbic Acid (VITAMIN C PO) Take 500 mg by mouth daily.   2/6/2017 at Unknown time     Nitroglycerin (NITROQUICK SL) Place 0.4 mg under the tongue as needed.    at PRN       ALLERGIES     Allergies   Allergen Reactions     Blood Transfusion Related (Informational Only) Other (See Comments)     Patient has a history of a clinically significant antibody against RBC antigens.  A delay in compatible RBCs may occur.          Review of Systems  A 10 point ROS was performed and negative unless otherwise noted in HPI.       Physical Exam  VITAL SIGNS:  There were no vitals taken for this visit.  BMI:  Estimated body mass index is 24.28 kg/(m^2) as calculated from the following:    Height as of 2/7/17: 1.829 m (6').    Weight as of 2/7/17: 81.2 kg (179 lb).     General: NAD, pleasant and cooperative - pale   HEENT: oropharynx clear with MMM, EOMI, PERRL     Pulmonary: clear but diminished breath sounds b/l  Cardiovascular: RRR, no murmur   Abdominal: soft, non-tender, non-distended   Extremities: warm, well perfused, no edema in bilateral lower extremities, no tenderness in calves - right PICC line intact   MSK/neuro:   Mental Status:  alert and oriented x3    Cranial Nerves: grossly normal    Sensory: absent to light touch in toes and plantar aspects of feet b/l; also diminished to LT at finger tips b/l    Strength: 4+/5 in bilateral upper extremities     HF  KE  DF  EHL  PF   R  1/5 2/5 3/5 3/5 4/5  L  1/5 3/5 3/5 3/5 4/5    Reflexes: 1+ at patellar and absent at Achilles b/l        Labs  Pertinent labs     Lab Results   Component Value Date    WBC 9.9 02/13/2017    HGB 8.2 (L) 02/13/2017    HCT 26.0 (L) 02/13/2017    MCV 88 02/13/2017     02/13/2017     Lab Results   Component Value Date     (L) 02/13/2017    POTASSIUM 3.9 02/13/2017    CHLORIDE 99 02/13/2017    CO2 24 02/13/2017     (H) 02/13/2017     Lab Results   Component Value Date    AST 11 01/21/2017    ALT 23 01/21/2017    ALKPHOS 101 01/21/2017    BILITOTAL 0.7 01/21/2017         ASSESSMENT/PLAN:  Tad Manning is a 76 year old right hand dominant male with 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 Medical co-morbidities include post-op pain, anemia, hyponatremia, and h/o bacteremia on IV antibiotic. PMH significant for DM with peripheral neuropathy, HTN, HLD, CAD s/p CABG, systolic and diastolic heart failure, and CLL in remission.     Functional deficits are weakness in bilateral lower extremities, sensory loss in feet (due to diabetic neuropathy but worse likely due to SCI), neurogenic bladder and bowel, and acute on chronic deconditioning with poor endurance.        Admission to acute inpatient rehab 2/15.        1. PT, and OT 90 minutes of each on a daily basis, in addition to  rehab nursing and close management of physiatrist.      2. Impairment of ADL's: OT for 90 min daily to work on self care, dressing, toileting, bathing, energy conservation techniques with use of ADs as needed.     Impairment of mobility:  PT for 90 min daily to work on strengthening, endurance buildup, transfers with use of assistive device; will likely require a WC at discharge pending his progress.     ** consider SLP eval given some confusion in the acute hospital; await OT cognitive eval.     3. Rehab RN to administer medication, patient education on medication taking, VS monitoring, bowel regimen, glucose monitoring and wound care/surgical wound dressing changes and monitoring.     1. Medical Conditions; appreciate hospitalist team assistance in medical management.     # Incomplete SCI:  # Thoracic osteomyelitis/discitis with epidural abscess:  # S/p T9-10 laminectomy, T7-12 posterior arthrodesis and TF right sided T9-10 fusion.   # Strep mitis bacteremia:     --TLSO for comfort  --wound care per recs; will remove sutures at 2 weeks higinio ~ 2/26  --PICC line care on the right arm   --pain management with lidoderm patch, prn tylenol, oxycodone and flexeril - will adjust as needed   --continue IV rocephin for total of 6 weeks - end date 3/24  --should f/u with ID and neurosurgery teams as outpatient      ** Check CBC, CRP, creatinine, and LFTs weekly while on therapy and have results faxed to the [facility] at [phone].     Hyponatremia: since 2/12 and stable ~ 131. 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 Will repeat labs in am.     Anemia: likely due to acute illness and blood loss; stable since his surgery. Will monitor clinically and repeat labs as needed.     # DM type II with neuropathy: on glipizide and victoza injections prior to admission. HgbA1C was 6.1% on 2/10. Currently on lantus 12 units daily and SSI.     # PAD s/p bilateral iliac artery stents (2013) and LLE  bypass (2014):  Also had amputation of his left 2nd toe. Continue ASA and will restart plavix on 2/20.      # HTN: on amlodipine, atenolol, lisinopril and prinzide (?) prior to admission. Currently on atenolol 25 daily; BP has been in good range.       # HLD: on lipitor 40 daily. LDL was 48 in 5/2014 when last checked.       # CAD s/p CABG in 1995 with HFrEF (EF 40-45%): no active issues - continue ASA, lipitor and atenolol. Low threshold to repeat echo if any clinical s/s of heart failure.     # H/o CLL in remission: last chemo in 2015; is followed by oncology team - no active issues.     2. Adjustment to disability:  Clinical psychology to eval and treat; will discuss with pt.   3. FEN: regular, mod consistent CHO.   4. Bowel: ? Neurogenic bowel - on prn bowel meds upon admission with last BM on 2/14. Will monitor and adjust meds/bowel regimen as needed.   5. Bladder: ? Neurogenic bladder - h/o BPH on flomax prior to admission; was not resumed after admission to the hospital likely due to low BP. Will check PVRs.   6. DVT Prophylaxis: mechanical; per neurosurgery team notes Ok to start chemical prophylaxis on 2/16 - will clarify.   7. GI Prophylaxis: none, oral intake.   8. Code: full.   9. Disposition: home vs TCU pending progress and family support.   10. ELOS:  3-4 weeks.   11. Follow up Appointments on Discharge: ID, neurosurgery, PCP and PM&R.   Follow-up with neurosurgery, Dr. Marcelo Trent MD in 2 weeks for wound check.      Follow-up with Infectious Disease, Dr. Gillian Vila, on 3/23/17. If you have not heard back regarding your appointment time please call [phone] and ask to page the Infectious Disease fellow regarding your pre-planned appointment with Dr. Vila on 3/23/17 to assess antibiotic regimen and progress    Hyun Koehler MD  Physical Medicine & Rehabilitation    80 minutes spent with admission, more than 50% in direct patient interaction and education, the remainder in coordination  of care.

## 2017-02-15 NOTE — PLAN OF CARE
Problem: Goal Outcome Summary  Goal: Goal Outcome Summary  Outcome: Adequate for Discharge Date Met:  02/15/17  D/I:Neuros unchanged from yesterday. R LE 3-, L LE 3.UE= strong. Sl confused at times. Back incision D/I. Hemovac site dressing changed at 8am and has been C/D. Tylenol, Flexeril and oxycodone for back pain. Up with lift to chair.On brittany count, Po'ing poor.Voiding spont in urinal.+flatus, smear BM.  P: to Rehab at 2:45pm    Problem: Discharge Planning  Goal: Discharge Planning (Adult, OB, Behavioral, Peds)  Outcome: Adequate for Discharge Date Met:  02/15/17  Report given to Katlyn at acute rehab. He has his partials, glasses and phone. He reluctantly took brace and also has 2 bags of belongings.

## 2017-02-15 NOTE — IP AVS SNAPSHOT
[facility]: [phone]                                              INTERAGENCY TRANSFER FORM - PHYSICIAN ORDERS   2/15/2017                    Hospital Admission Date: 2/15/2017  PETER BUTLER   : 1940  Sex: Male        Attending Provider: Hyun Koehler MD     Allergies:  Blood Transfusion Related (Informational Only)    Infection:  None   Service:  ACUTE IP ANGELIQUE    Ht:  1.829 m (6')   Wt:  88.9 kg (196 lb)   Admission Wt:  90.3 kg (199 lb 1.6 oz)    BMI:  26.58 kg/m 2   BSA:  2.13 m 2            Patient PCP Information     Provider PCP Type    [facility]      ED Clinical Impression     Diagnosis Description Comment Added By Time Added    Primary insomnia [F51.01] Primary insomnia [F51.01]  Boris Melendez MD 3/18/2017  9:13 AM    Epidural abscess [G06.2] Epidural abscess [G06.2]  Boris Melendez MD 3/18/2017  9:14 AM    Type 2 diabetes mellitus with diabetic polyneuropathy, without long-term current use of insulin (H) [E11.42] Type 2 diabetes mellitus with diabetic polyneuropathy, without long-term current use of insulin (H) [E11.42]  Boris Melendez MD 3/18/2017  9:15 AM    Essential hypertension [I10] Essential hypertension [I10]  Boris Melendez MD 3/18/2017  9:15 AM    Atrial flutter, unspecified type (H) [I48.92] Atrial flutter, unspecified type (H) [I48.92]  Boris Melendez MD 3/18/2017  9:15 AM    Acute midline low back pain, with sciatica presence unspecified [M54.5] Acute midline low back pain, with sciatica presence unspecified [M54.5]  Boris Melendez MD 3/18/2017  9:16 AM    Acute systolic heart failure (H) [I50.21] Acute systolic heart failure (H) [I50.21]  Boris Melendez MD 3/18/2017  9:17 AM    Loop diuretic causing adverse effect in therapeutic use, subsequent encounter [T50.1X5D] Loop diuretic causing adverse effect in therapeutic use, subsequent encounter  [T50.1X5D]  Boris Melendez MD 3/18/2017  9:18 AM    Gastroesophageal reflux disease without esophagitis [K21.9] Gastroesophageal reflux disease without esophagitis [K21.9]  Boris Melendez MD 3/18/2017  9:18 AM      Hospital Problems as of 3/19/2017              Priority Class Noted POA    Osteomyelitis of thoracic spine (H) Medium  2/15/2017 Yes    Atrial flutter (H) Medium  2/22/2017 Unknown      Non-Hospital Problems as of 3/19/2017              Priority Class Noted    Critical lower limb ischemia , s/p L AK pop to BK pop bypass with rGSV on 1/10/2014   1/10/2014    Diabetes mellitus, type 2 (H) Medium  1/11/2014    HTN (hypertension) Medium  1/11/2014    CAD (coronary artery disease) Medium  1/11/2014    Hyperlipidemia with target LDL less than 100 Medium  1/11/2014    Osteomyelitis left 2nd Toe s/p amputaion 1/10/14 Medium  1/11/2014    Renal insufficiency Medium  1/20/2014    Renal failure Medium  1/20/2017    Fall Medium  2/7/2017    Discitis Medium  2/8/2017    Epidural abscess Medium  2/9/2017      Code Status History     Date Active Date Inactive Code Status Order ID Comments User Context    3/18/2017  9:48 PM  Full Code [id number]  Dale Leroy MD Outpatient    2/15/2017  3:30 PM 3/18/2017  9:48 PM Full Code [id number]  Hyun Koehler MD Inpatient    2/15/2017 11:43 AM 2/15/2017  3:30 PM Full Code [id number]  Jeaneth Mesa MD Outpatient    2/8/2017 10:43 PM 2/15/2017 11:43 AM Full Code [id number]  Ayse Scott PA-C Outpatient    2/7/2017  9:45 PM 2/8/2017 10:43 PM Full Code [id number]  Moody Beckford MD ED    1/23/2017  3:32 PM 2/7/2017  9:45 PM Full Code [id number]  Patria Dee MD Outpatient    1/20/2017  6:43 PM 1/23/2017  3:32 PM Full Code [id number]  Kalia Barrientos DO Inpatient    1/10/2014  5:40 PM 1/14/2014  5:20 PM Full Code [id number]  Kiya Betts RN Inpatient         Medication Review      START taking        Dose / Directions  Comments    clopidogrel 75 MG tablet   Commonly known as:  PLAVIX        Dose:  75 mg   Take 1 tablet (75 mg) by mouth daily   Quantity:  30 tablet   Refills:  0        furosemide 40 MG tablet   Commonly known as:  LASIX   Used for:  Acute systolic heart failure (H)        Dose:  40 mg   Take 1 tablet (40 mg) by mouth daily   Quantity:  30 tablet   Refills:  0        lisinopril 10 MG tablet   Commonly known as:  PRINIVIL/ZESTRIL   Used for:  Essential hypertension        Dose:  10 mg   Take 1 tablet (10 mg) by mouth daily   Quantity:  30 tablet   Refills:  0        melatonin 3 MG tablet   Used for:  Primary insomnia        Dose:  3 mg   Take 1 tablet (3 mg) by mouth nightly as needed   Refills:  0        metFORMIN 500 MG tablet   Commonly known as:  GLUCOPHAGE   Used for:  Type 2 diabetes mellitus with diabetic polyneuropathy, without long-term current use of insulin (H)        Dose:  500 mg   Take 1 tablet (500 mg) by mouth 2 times daily (with meals)   Quantity:  60 tablet   Refills:  0        metoprolol 25 MG tablet   Commonly known as:  LOPRESSOR        Dose:  75 mg   Take 3 tablets (75 mg) by mouth 2 times daily   Refills:  0        mirtazapine 15 MG tablet   Commonly known as:  REMERON   Used for:  Primary insomnia        Dose:  15 mg   Take 1 tablet (15 mg) by mouth At Bedtime   Quantity:  60 tablet   Refills:  0        potassium chloride SA 20 MEQ CR tablet   Commonly known as:  K-DUR/KLOR-CON M   Used for:  Loop diuretic causing adverse effect in therapeutic use, subsequent encounter        Dose:  20 mEq   Take 1 tablet (20 mEq) by mouth daily   Quantity:  90 tablet   Refills:  0        ranitidine 150 MG tablet   Commonly known as:  ZANTAC   Used for:  Gastroesophageal reflux disease without esophagitis        Dose:  150 mg   Take 1 tablet (150 mg) by mouth 2 times daily   Quantity:  60 tablet   Refills:  0          CONTINUE these medications which may have CHANGED, or have new prescriptions. If we are  uncertain of the size of tablets/capsules you have at home, strength may be listed as something that might have changed.        Dose / Directions Comments    insulin glargine 100 UNIT/ML injection   Commonly known as:  LANTUS   This may have changed:  how much to take   Used for:  Epidural abscess        Dose:  22 Units   Inject 22 Units Subcutaneous every morning (before breakfast)   Quantity:  30 mL   Refills:  0          CONTINUE these medications which have NOT CHANGED        Dose / Directions Comments    acetaminophen 325 MG tablet   Commonly known as:  TYLENOL   Used for:  Acute midline low back pain, with sciatica presence unspecified        Dose:  650 mg   Take 2 tablets (650 mg) by mouth every 4 hours as needed for other (surgical pain)   Quantity:  100 tablet   Refills:  0        aspirin 325 MG tablet   Used for:  Acute midline low back pain, with sciatica presence unspecified        Dose:  325 mg   Take 1 tablet (325 mg) by mouth daily   Quantity:  120 tablet   Refills:  0        atorvastatin 40 MG tablet   Commonly known as:  LIPITOR   Used for:  Claudication of left lower extremity (H)        Dose:  40 mg   Take 1 tablet by mouth daily.   Quantity:  30 tablet   Refills:  1        cefTRIAXone 2 GM vial   Commonly known as:  ROCEPHIN   Indication:  Bone and Joint Infection, Epidural abscess   Used for:  Acute midline low back pain, with sciatica presence unspecified        Dose:  2 g   Inject 2 g into the vein every 12 hours for 11 doses   Quantity:  5 each   Refills:  0        NITROQUICK SL        Dose:  0.4 mg   Place 0.4 mg under the tongue as needed.   Refills:  0        order for DME   Used for:  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), Diabetic ulcer of left foot due to type 2 diabetes mellitus (H)        Equipment being ordered: DH2 shoe   Quantity:  1 Device   Refills:  0        polyethylene glycol Packet   Commonly known as:  MIRALAX/GLYCOLAX   Used for:  Constipation, unspecified  constipation type        Dose:  17 g   Take 17 g by mouth daily as needed for constipation   Quantity:  7 packet   Refills:  0        senna-docusate 8.6-50 MG per tablet   Commonly known as:  SENOKOT-S;PERICOLACE   Used for:  Constipation, unspecified constipation type        Dose:  1-2 tablet   Take 1-2 tablets by mouth 2 times daily as needed (constipation )   Quantity:  100 tablet   Refills:  0        tamsulosin 0.4 MG capsule   Commonly known as:  FLOMAX        Dose:  0.8 mg   Take 0.8 mg by mouth At Bedtime   Refills:  0        VITAMIN C PO        Dose:  500 mg   Take 500 mg by mouth daily.   Refills:  0        VITAMIN D PO        Dose:  1000 Units   Take 1,000 Units by mouth daily.   Refills:  0          STOP taking     atenolol 25 MG tablet   Commonly known as:  TENORMIN           bacitracin-polymyxin b ointment   Commonly known as:  POLYSPORIN           cyclobenzaprine 5 MG tablet   Commonly known as:  FLEXERIL           insulin aspart 100 UNIT/ML injection   Commonly known as:  NovoLOG PEN           lidocaine 5 % Patch   Commonly known as:  LIDODERM           oxyCODONE 5 MG IR tablet   Commonly known as:  ROXICODONE           ZOLPIDEM TARTRATE PO                     Further instructions from your care team       TCU provider while there. Should have weekly CBC, CRP and BMP for 3 more weeks to assure infection and electrolyte stability.    Follow-Up Appointments:    -- Follow up with ID and cardiology as scheduled 3/23 and 5/1. Exploring if the ID appointment could be postponed or decisions made with labs alone. Message pending with Dr. Max.    -- Neurosurgery Dr. Francisco J Trent, f/u surgery in 3-4 weeks - needs scheduling.    -- Dr. Hyun Koehler in [facility] after TCU discharge as needed for additional rehabilitation needs if not addressed by TCU therapy team.  Primary provider after TCU discharge.    Summary of Visit     Reason for your hospital stay       Admitted to acute rehab after  spinal infection and surgery. Leg weakness has improved but not sufficient to discharge directly home. Requires longer course of rehabilitation and possibly will need alternate housing such as assisted living that's more accessible and help available.             After Care     Activity - Up with nursing assistance           Advance Diet as Tolerated       Follow this diet upon discharge: diabetic [id number] Calories: Moderate Consistent CHO (4-6 CHO units/meal);   Ensure Clear apple with breakfast, Ensure Plus Vanilla with lunch and Strawberry with supper.  Regular consistency / thin liquids.       Daily weights       Call Provider for weight gain of more than 2 pounds per day or 5 pounds per week.       Encourage PO fluids           General info for SNF       Length of Stay Estimate: Short Term Care: Estimated # of Days <30  Condition at Discharge: Improving  Level of care:skilled   Rehabilitation Potential: Fair  Admission H&P remains valid and up-to-date: Yes  Recent Chemotherapy: N/A  Use Nursing Home Standing Orders: Yes       Glucose monitor nursing POCT       Twice daily       Mantoux instructions       Give two-step Mantoux (PPD) Per Facility Policy Yes       Wound care (specify)       Skin care plan for Perinuem: Daily and as needed cleanse with Mahsa Cleanse and Protect daily and with each episode of incontinence. Apply a thin layer of Criticaid paste around rectum- Do Not Apply to Buttocks. Apply Sween 24 to dry, peeling skin on sacrum and buttocks. Apply twice daily.  Plan of care for right buttocks: Cleanse with Mahsa Cleanse and Protect every other day. Coat with Cavalon No Sting swab. Apply mepilex over open wound. Remind pt to lift buttocks to reposition, try not to slide.   Feet :Daily Apply Sween 24 lotion to callous and dry skin             Referrals     Nutrition Services Adult IP Consult       Reason: optimal nutrition, supplements, encourage protein intake.       Occupational Therapy Adult  Consult       Evaluate and treat as clinically indicated.       Physical Therapy Adult Consult       Evaluate and treat as clinically indicated             Supplies     AntiEmbolism Stockings       Bilateral below knee length.On in the morning, off at night             Your next 10 appointments already scheduled     Mar 23, 2017  8:00 AM CDT   (Arrive by 7:45 AM)   Return Visit with Gillian Max MD   [facility] ([facility])    [address] [place] [id number] [phone]            May 01, 2017 11:00 AM CDT   (Arrive by 10:45 AM)   Return Visit with Clarisse Valdes MD   [facility] ([facility])    [address] [place] [id number] [phone]              Statement of Approval     Ordered          03/19/17 1129  I have reviewed and agree with all the recommendations and orders detailed in this document.  EFFECTIVE NOW     Approved and electronically signed by:  Dale Leroy MD

## 2017-02-15 NOTE — PROGRESS NOTES
Hospital Medicine  Follow-up Consult Note  Date of Service: February 13, 2017    Patient: Tad Manning  MRN: [id number]  Admission Date: 2/9/2017  Hospital Day # 6    Recommendations:  - increase lantus to 12 units daily*    * I have made these changes for you.     Assessment & Plan: Tad Manning is a 76 year old man with a history of CAD s/p 3v CABG (1995), PAD s/p bilateral LE stents,  IDDM2,  CLL in remission, admitted to the Neurosurgical service from [facility] with T9-10 discitis and epidural absecess s/p T9-10 laminectomy for spinal cord decompression. Admission blood cultures growing strep mitis.  Now s/p T7-12 posterior fusion with TLIF T9-10 on 2/12/17.    # T9-10 discitis w/ epidural abscess s/p laminectomy s/p T7-T12 posterior fusion w/ TLIF T9-10 on 2/12.  # Streptococcus mitis bacteremia    - Continue ceftriaxone 2g IV q12h. ID consulted. Planning for at least 6 weeks of therapy (through 3/24). Will need weekly CBC, CRP, Cr, LFTs while on therapy. Results to be faxed to [facility] [phone]. He should f/u with Dr. Max with ID on 3/23.   - pain control per primary team  - Primary team planning to start dvt ppx on Thursday 2/16    # CAD s/p 3v CABG (1995)  # HFrEF (EF 40-45%)  Remains asymptomatic post-operatively. Appears euvolemic. Heart failure currently compensated.   - continue atenolol  - restarting aspirin today  - daily weight  - diabetic/low sodium diet     # PAD s/p bilateral iliac artery stents (2013) and LLE bypass (2014)  The patient's home ASA and plavix have been held since admission in anticipation of surgery.  Now that we are 3+ years out from the patient's stenting procedure, dual antiplatelet therapy becomes less essential.   - Restarting ASA today  - Restart plavix on discharge    # DM2, insulin dependent  Had just been started on lantus (5 units) prior to admission. A1c 6.1%. Improving BS now that he is off of full sugar soda.   - increase lantus to 12 units  qAM today. Will monitor BG on this. May need to add some scheduled mealtime insulin in future as well.   - continue high correctional scale  - hold home oral hypoglycemics  - switched from Ensure to Glucerna, diet soda only    # Constipation:  - miralax, senna/docusate  - bisacodyl suppository daily  - enema today    CODE: Full   Diet/IVF: Diabetic, low sodium diet, Glucerna, diet soda only  DVT ppx: SCDs. Pharm ppx timing deferred to primary team    Thank you for involving us in the care of Mr. Manning. We will continue to follow along with you.     Pt's care was discussed with bedside RN.    Avinash Olson MD   of Medicine  Med-Peds Hospitalist  Pager [phone]    Team: Medicine Cristian Consult  Page Cross Cover after 5 pm: pager [phone]   ___________________________________________________________________    Subjective & Interval Hx:    Nursing notes reviewed. Back is sore but not too bad. Strength in LE is improving. Sensation about the same today. Stooling. Tolerating diet.     Medications: Reviewed in EPIC.     Physical Exam:    Blood pressure 152/62, pulse 82, temperature 96.5  F (35.8  C), temperature source Oral, resp. rate 16, SpO2 97 %.    General: NAD. Pleasant.  Chest/Resp: Lungs CTAB anteriorly. Nonlabored breathing.    Heart/CV: RRR. Normal S1/S2. No extra heart sounds. Non-displaced PMI. No JVD. No LE edema.  Abdomen/GI: Soft, nontender, nondistended.    Extremities/MSK: Warm, well perfused.    Skin: No rashes.    Neuro: Alert. Oriented x3. Normal speech. 4/5 strength in feet, 3/5 in legs. decreased but improving sensation.  Psych: Appropriate mood.       Labs & Studies of Note: I personally all lab and imaging studies in the last 24h.     Glucoses 160 - 220    All cultures:    Recent Labs  Lab 02/09/17  2329 02/09/17  2206 02/09/17  0334 02/09/17  0153 02/08/17  1945   CULT No growth after 5 days No growth Moderate growth Streptococcus mitis groupThese bacteria are part of normal skin  himanshu, but on occasion, may be true pathogens.  Clinical correlation must be applied to interpreting this microbiology result.*  Culture negative monitoring continues No growth  On day 2, isolated in broth only: Streptococcus mitis group Susceptibility testing done on previous specimenCulture in progress* Cultured on the 1st day of incubation: Streptococcus oralisCritical Value/Significant Value, preliminary result only, called to and read back by Florin Villegas RN 4A @ 1605 2/9/17 CSSusceptibility testing done on previous specimen*  Cultured on the 1st day of incubation: Streptococcus oralisCritical Value/Significant Value, preliminary result only, called to and read back by Florin Villegas RN 4A @ 1608 2/9/17 CS(Note)POSITIVE for STREPTOCOCCUS SPECIES OTHER THAN pneumococcus, anginosusgroup, S. pyogenes and S. agalactiae. Performed using Buzztalaigenemultiplex nucleic acid test. Final identification and antimicrobialsusceptibility testing will be verified by standard methods.Specimen tested with Verigene multiplex, gram-positive blood culturenucleic acid test for the following targets: Staph aureus, Staphepidermidis, Staph lugdunensis, other Staph species, Enterococcusfaecalis, Enterococcus faecium, Streptococcus species, S. agalactiae,S. anginosus grp., S. pneumoniae, S. pyogenes, Listeria sp., mecA(methicillin resistance) and Mckenna/B (vancomycin resistance).Critical Value/Significant Value called to and read back by Darryl RN @7737 2/9/17. ct*

## 2017-02-16 ENCOUNTER — CARE COORDINATION (OUTPATIENT)
Dept: CARDIOLOGY | Facility: CLINIC | Age: 77
End: 2017-02-16

## 2017-02-16 LAB
BACTERIA SPEC CULT: NO GROWTH
GLUCOSE BLDC GLUCOMTR-MCNC: 149 MG/DL (ref 70–99)
GLUCOSE BLDC GLUCOMTR-MCNC: 149 MG/DL (ref 70–99)
GLUCOSE BLDC GLUCOMTR-MCNC: 189 MG/DL (ref 70–99)
GLUCOSE BLDC GLUCOMTR-MCNC: 221 MG/DL (ref 70–99)
GLUCOSE BLDC GLUCOMTR-MCNC: 243 MG/DL (ref 70–99)
MICRO REPORT STATUS: NORMAL
SPECIMEN SOURCE: NORMAL

## 2017-02-16 PROCEDURE — 40000193 ZZH STATISTIC PT WARD VISIT: Performed by: PHYSICAL THERAPIST

## 2017-02-16 PROCEDURE — 00000146 ZZHCL STATISTIC GLUCOSE BY METER IP

## 2017-02-16 PROCEDURE — 12800006 ZZH R&B REHAB

## 2017-02-16 PROCEDURE — 40000133 ZZH STATISTIC OT WARD VISIT: Performed by: STUDENT IN AN ORGANIZED HEALTH CARE EDUCATION/TRAINING PROGRAM

## 2017-02-16 PROCEDURE — 97530 THERAPEUTIC ACTIVITIES: CPT | Mod: GP | Performed by: PHYSICAL THERAPIST

## 2017-02-16 PROCEDURE — 99211 OFF/OP EST MAY X REQ PHY/QHP: CPT

## 2017-02-16 PROCEDURE — 25000128 H RX IP 250 OP 636: Performed by: INTERNAL MEDICINE

## 2017-02-16 PROCEDURE — 97110 THERAPEUTIC EXERCISES: CPT | Mod: GP | Performed by: PHYSICAL THERAPIST

## 2017-02-16 PROCEDURE — 25000131 ZZH RX MED GY IP 250 OP 636 PS 637: Mod: GY | Performed by: INTERNAL MEDICINE

## 2017-02-16 PROCEDURE — 99232 SBSQ HOSP IP/OBS MODERATE 35: CPT | Performed by: INTERNAL MEDICINE

## 2017-02-16 PROCEDURE — 97535 SELF CARE MNGMENT TRAINING: CPT | Mod: GO | Performed by: STUDENT IN AN ORGANIZED HEALTH CARE EDUCATION/TRAINING PROGRAM

## 2017-02-16 PROCEDURE — 97167 OT EVAL HIGH COMPLEX 60 MIN: CPT | Mod: GO | Performed by: STUDENT IN AN ORGANIZED HEALTH CARE EDUCATION/TRAINING PROGRAM

## 2017-02-16 PROCEDURE — A9270 NON-COVERED ITEM OR SERVICE: HCPCS | Mod: GY | Performed by: INTERNAL MEDICINE

## 2017-02-16 PROCEDURE — 97110 THERAPEUTIC EXERCISES: CPT | Mod: GO | Performed by: STUDENT IN AN ORGANIZED HEALTH CARE EDUCATION/TRAINING PROGRAM

## 2017-02-16 PROCEDURE — 25000125 ZZHC RX 250: Performed by: PHYSICAL MEDICINE & REHABILITATION

## 2017-02-16 PROCEDURE — 97162 PT EVAL MOD COMPLEX 30 MIN: CPT | Mod: GP | Performed by: PHYSICAL THERAPIST

## 2017-02-16 PROCEDURE — 25000132 ZZH RX MED GY IP 250 OP 250 PS 637: Mod: GY | Performed by: INTERNAL MEDICINE

## 2017-02-16 RX ADMIN — CEFTRIAXONE 2 G: 2 INJECTION, POWDER, FOR SOLUTION INTRAMUSCULAR; INTRAVENOUS at 13:12

## 2017-02-16 RX ADMIN — CEFTRIAXONE 2 G: 2 INJECTION, POWDER, FOR SOLUTION INTRAMUSCULAR; INTRAVENOUS at 00:48

## 2017-02-16 RX ADMIN — INSULIN GLARGINE 12 UNITS: 100 INJECTION, SOLUTION SUBCUTANEOUS at 09:17

## 2017-02-16 RX ADMIN — ATENOLOL 25 MG: 25 TABLET ORAL at 09:16

## 2017-02-16 RX ADMIN — OXYCODONE HYDROCHLORIDE 10 MG: 5 TABLET ORAL at 13:10

## 2017-02-16 RX ADMIN — SODIUM CHLORIDE, PRESERVATIVE FREE 5 ML: 5 INJECTION INTRAVENOUS at 22:04

## 2017-02-16 RX ADMIN — CYCLOBENZAPRINE HYDROCHLORIDE 5 MG: 5 TABLET, FILM COATED ORAL at 17:29

## 2017-02-16 RX ADMIN — TAMSULOSIN HYDROCHLORIDE 0.8 MG: 0.4 CAPSULE ORAL at 21:58

## 2017-02-16 RX ADMIN — INSULIN ASPART 3 UNITS: 100 INJECTION, SOLUTION INTRAVENOUS; SUBCUTANEOUS at 17:28

## 2017-02-16 RX ADMIN — OXYCODONE HYDROCHLORIDE 10 MG: 5 TABLET ORAL at 21:58

## 2017-02-16 RX ADMIN — ASPIRIN 325 MG ORAL TABLET 325 MG: 325 PILL ORAL at 09:16

## 2017-02-16 RX ADMIN — INSULIN ASPART 1 UNITS: 100 INJECTION, SOLUTION INTRAVENOUS; SUBCUTANEOUS at 09:17

## 2017-02-16 RX ADMIN — OXYCODONE HYDROCHLORIDE 10 MG: 5 TABLET ORAL at 09:24

## 2017-02-16 RX ADMIN — OXYCODONE HYDROCHLORIDE AND ACETAMINOPHEN 500 MG: 500 TABLET ORAL at 09:16

## 2017-02-16 RX ADMIN — INSULIN ASPART 1 UNITS: 100 INJECTION, SOLUTION INTRAVENOUS; SUBCUTANEOUS at 21:55

## 2017-02-16 RX ADMIN — ATORVASTATIN CALCIUM 40 MG: 40 TABLET, FILM COATED ORAL at 09:16

## 2017-02-16 RX ADMIN — VITAMIN D, TAB 1000IU (100/BT) 1000 UNITS: 25 TAB at 09:16

## 2017-02-16 RX ADMIN — INSULIN ASPART 1 UNITS: 100 INJECTION, SOLUTION INTRAVENOUS; SUBCUTANEOUS at 13:22

## 2017-02-16 RX ADMIN — ACETAMINOPHEN 650 MG: 325 TABLET, FILM COATED ORAL at 17:29

## 2017-02-16 RX ADMIN — OXYCODONE HYDROCHLORIDE 10 MG: 5 TABLET ORAL at 00:48

## 2017-02-16 ASSESSMENT — ACTIVITIES OF DAILY LIVING (ADL): PREVIOUS_RESPONSIBILITIES: MEAL PREP;HOUSEKEEPING;LAUNDRY;SHOPPING;MEDICATION MANAGEMENT;FINANCES;DRIVING;WORK

## 2017-02-16 NOTE — PLAN OF CARE
"Problem: Goal/Outcome  Goal: Goal Outcome Summary  FOCUS/GOAL  Bowel management, Nutrition/Feeding/Swallowing precautions, Pain management and Skin integrity     ASSESSMENT, INTERVENTIONS AND CONTINUING PLAN FOR GOAL:  Patient is alert and oriented, using call light appropriately, on bed alarms for safety.  Patient's pain has been controlled with PRN Oxycodone 10 mg x2 onthis shift.  Patient needs Ao2 for bed repositioning and needs to be checked q2 hours, is able to use the urinal per self but one check had dribbled some after using.  Patient's incision is ALISHA with sutures in tact, pt is fairly diaphoretic so skin needs monitoring- WOC nurse was here to assess redness to sacrum and wrote new orders as injury is likely r/t moisture.  No BM on this shift, per patient last BM was loose and states that previously the urge to have a BM came on fast but was able to tell when he had \"already had an accident.\"  Patient's appetite is good, BG were 149 and 189- ss insulin coverage given.  No additional care concerns at this time, continue with POC.            "

## 2017-02-16 NOTE — PLAN OF CARE
Problem: Goal/Outcome  Goal: Goal Outcome Summary  Outcome: No Change  FOCUS/GOAL  Bladder management, Pain management and Mobility     ASSESSMENT, INTERVENTIONS AND CONTINUING PLAN FOR GOAL:  Pt awake until 2:00 AM and was then able to fall asleep. Pt repositioned with 2 staff assistance, but pt does not want to lay on his side, and wants HOB elevated. Pt medicated for mid back pain with PRN Oxycodone. IV antibiotic infused through PICC. Pt using his call light to ask for assistance with needs.

## 2017-02-16 NOTE — PROGRESS NOTES
No new issues reported   No cp or sob   No fever ro hcills  No nausea or vomiting  On Exam ;     Alert and oriented . No acute distress  Vital signs:  Temp: 99  F (37.2  C) Temp src: Oral BP: 162/72 Pulse: 79   Resp: 16 SpO2: 94 % O2 Device: None (Room air)   Height: 182.9 cm (6') Weight: 90.3 kg (199 lb 1.6 oz)  Estimated body mass index is 27 kg/(m^2) as calculated from the following:    Height as of this encounter: 1.829 m (6').    Weight as of this encounter: 90.3 kg (199 lb 1.6 oz).      Neck ; supple , no JVD. PICC line in place  Chest ; equal BS bilaterally , no rales or rhonchi   CVS; RRR, no murmur /rubs or gallops  GI ; soft abdomen, non tender, BS positive  Ext ; no edema , no cynosis  Psych ; appropriate mood and effect  Skin ; no rash or purpura.  Labs; none   A/P  Tad Manning is a 76-year-old  gentleman transferred from the [facility] after T7-T12 posterior spinal segmental instrumentation and T9-T10 interbody fusion and posterior arthrodesis T70-T12 for a spinal abscess with osteomyelitis and diskitis ON 2/15  1. 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 The patient should have a CBC, CRP, creatinine and LFTs done weekly. Followup with Infectious Disease, Dr. Gillian Max on 03/23/2017.   2. Diabetes mellitus: Continue the Lantus at night along with sliding scale insulin.   3. Peripheral vascular disease, status post bilateral lower extremity stents: Continue aspirin and Plavix.( to be started 2/20)   4. Chronic lymphocytic leukemia in remission, last chemo was 09/2015: Follow up with Oncology.   5. Coronary artery disease: Continue aspirin 325 mg daily, Lipitor 40 mg daily, atenolol 25 mg daily.   6. Spinal cord compression with lower extremity weakness: Primary team to address

## 2017-02-16 NOTE — PLAN OF CARE
Problem: Goal/Outcome  Goal: Goal Outcome Summary  Outcome: No Change  FOCUS/GOAL  Bladder management, Wound care management, Mobility and Skin integrity     ASSESSMENT, INTERVENTIONS AND CONTINUING PLAN FOR GOAL:  Pt A&Ox4, very pleasant. Complains of back pain-received Oxycodone 10mg x2. Uses urinal in bed. Liko Lift. Pt needs ao2 to reposition in bed. Suspected pressure injury on coccyx-foam applied-woc order placed. Back incision WDL-approximated with stables. Picc dressing changed, both lumen heparin locked. Continue with poc.

## 2017-02-16 NOTE — PROGRESS NOTES
[facility]   Acute Rehabilitation Unit  Daily progress note    interval history  Tad Manning was seen and examined at bedside. Reclining in bed, pt says he feels like he has been sitting at an angle. Some difficulty sleeping/adjusting to new surroundings. Bed was damp in AM, unsure if incontinence or sweat. Tangential and some difficulty with memory - could not recall details of morning OT session, however otherwise oriented and cognitively intact. Pt is hoping to find a way to obtain a newspaper every day he is here and also to get a haircut.      Medications  Current Facility-Administered Medications   Medication     Patient is already receiving anticoagulation with heparin, enoxaparin (LOVENOX), warfarin (COUMADIN)  or other anticoagulant medication     acetaminophen (TYLENOL) tablet 650 mg     ascorbic acid (VITAMIN C) tablet 500 mg     aspirin tablet 325 mg     atenolol (TENORMIN) tablet 25 mg     atorvastatin (LIPITOR) tablet 40 mg     cefTRIAXone (ROCEPHIN) 2 g vial to attach to  ml bag for ADULTS or NS 50 ml bag for PEDS     cholecalciferol (vitamin D) tablet 1,000 Units     cyclobenzaprine (FLEXERIL) tablet 5 mg     insulin glargine (LANTUS) injection 12 Units     lidocaine (LIDODERM) 5 % Patch 1 patch     oxyCODONE (ROXICODONE) IR tablet 5-10 mg     polyethylene glycol (MIRALAX/GLYCOLAX) Packet 17 g     senna-docusate (SENOKOT-S;PERICOLACE) 8.6-50 MG per tablet 1-2 tablet     tamsulosin (FLOMAX) capsule 0.8 mg     glucose 40 % gel 15-30 g    Or     dextrose 50 % injection 25-50 mL    Or     glucagon injection 1 mg     insulin aspart (NovoLOG) inj (RAPID ACTING)     lidocaine (LIDODERM) patch REMOVAL     lidocaine (LIDODERM) Patch in Place     naloxone (NARCAN) injection 0.1-0.4 mg     sodium chloride (PF) 0.9% PF flush 10-20 mL     heparin lock flush 10 UNIT/ML injection 5-10 mL        physical exam  /72 (BP Location: Left arm)  Pulse 79  Temp 99   F (37.2  C) (Oral)  Resp 16  Ht 1.829 m (6')  Wt 90.3 kg (199 lb 1.6 oz)  SpO2 94%  BMI 27 kg/m2  Gen: Well appearing man, reclined in bed at time of exam. Pleasant, appropriately alert and oriented.  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 Medical co-morbidities include post-op pain, anemia, hyponatremia, and h/o bacteremia on IV antibiotic. PMH significant for DM with peripheral neuropathy, HTN, HLD, CAD s/p CABG, systolic and diastolic heart failure, and CLL in remission.      Functional deficits are weakness in bilateral lower extremities, sensory loss in feet (due to diabetic neuropathy but worse likely due to SCI), neurogenic bladder and bowel, and acute on chronic deconditioning with poor endurance.         Admission to acute inpatient rehab 2/15.    Impairment of ADL's: OT for 90 min daily to work on self care, dressing, toileting, bathing, energy conservation techniques with use of ADs as needed.     Impairment of mobility:  PT for 90 min daily to work on strengthening, endurance buildup, transfers with use of assistive device; will likely require a WC at discharge pending his progress.    Impairment of cognition/language/swallow:  consider SLP eval given some confusion in the acute hospital; await OT cognitive eval.         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 Spinal decompression performed 2/9, arthrodesis on 2/12. PICC placed 2/10, rocephin initiated 2/12. Evaluated by PT/OT inpatient and recommended for acute rehab. Pain management - pt reports improvement w/oxycodone, prefers not to use Lidoderm due to cost.  -Continue IV ceftriaxone 6 weeks via PICC  -Pain management with lidoderm patch, prn acetaminophen, prn flexeril, and prn oxycodone   -Surgical wound care  -Check CBC, CRP, creatinine, and LFTs weekly, fax results to [facility] ([phone])     #DM type II with peripheral neuropathy  Previously on novolog and lantus for management. Most recent HgbA1C 6.1 on 2/10. BG levels 149-162 since admission.  -Continue lantus 12U qd and novolog sliding scale     #Anemia  Normocytic, appears chronic in nature. Stable since surgeries 2/9 and 2/12.  -Monitor clinically  -Repeat CBC as indicated     #Peripheral artery disease, s/p bilateral iliac artery stent placement, left LE bypass, amputation of L second toe  #CAD s/p CABG  #HTN  #Hyperlipidemia  -Continue current medication regimen: ASA, lipitor, atenolol  -Restart plavix 2/20     #CLL in remission  In remission since 2015     # Bowel and bladder  History of BPH, not restarted on Flomax due to hypotension.   -Monitor clinically and perform PVRs  -Prn bowel meds ordered     # FEN  One month ago, ARF (Cr 3.14) that improved with aggressive rehydration, Additionally, protein malnutrition with significant weight loss.  -Ensure adequate hydration and nutrition  -Consider dietician consult     # DVT prophyplaxis  -mechanical; per neurosurgery team notes Ok to start chemical prophylaxis on 2/16. Clarified with neurosurg and they recommended initiating Lovenox.       Discharge Goals:  Estimated Length of Stay: 3-4 weeks  Prognosis: Good, given  improvement in symptoms since surgery  Code Status: full  Anticipated D/C Destination and functional outcome: home vs TCU pending progress and family support.   Follow up Appointments on Discharge: neurosurgery Dr. Trent in 2 weeks, ID Dr. Vila on 3/23        Chad Zavala, MS-3    This patient was discussed with the attending physician, Dr. Hyun Koehler      I agree with the above note as completed by the Chad Farrsh, MS-3.  Please see my separate note for my key findings and decision makings.     Hyun Koehler MD  Physical Medicine & Rehabilitation

## 2017-02-16 NOTE — PROGRESS NOTES
[facility]  WOC Nurse Inpatient Skin Assessment     Follow up  Assessment of:   coccyx      Data:   Patient History:      per MD note(s):  76 year old male with CAD, DM with neuropathy, HTN, PVD with bilateral LE stents on ASA/plvx, Chronic lymphocytic leukemia in remission (last chemo in 2015) who presents after a fall on  and R > L leg weakness and inability to ambulate. His story is inconsistent and somewhat tangential but he clearly articulates that 72 hours prior he was able to ambulation but now he cannot. 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   [facility] was consulted to evaluate and treat a coccyx pressure injury. ( he was last assessesed by our service on 17 and found to have intact coccyx at that time)    Moisture Management:  Incontinence Protocol        Current Diet / Nutrition:           Active Diet Order      Combination Diet [id number] Calories: Moderate Consistent CHO (4-6 CHO units/meal)            Other     Roberto Assessment and sub scores:   Roberto Score  Av.5  Min: 12  Max: 20       Labs:   Recent Labs   Lab Test  17   0915   17   0835   02/10/17   0230   17   0740   ALBUMIN   --    --    --    --    --    --   2.7*   HGB  8.2*   < >  8.2*   < >  8.3*   < >  8.9*   RBC  2.97*   --    --    < >  3.05*   < >  3.05*   WBC  9.9   --    --    < >  8.8   < >  6.7   PLT  198   --   144*   < >  215   < >  151   INR   --    --   1.22*   --    --    < >   --    A1C   --    --    --    --   6.1*   --   7.0*   CRP   --    --    --    --   140.0*   --    --     < > = values in this interval not displayed.          Skin Assessment (location):   Perianal region  History:  Incontinence; immobility      Skin: denuded, erythema and exfoliating,in the immediate area encirciling perineum just above anl region    Color:  pink    Temperature  warm    Drainage:      Amount: scant .     Color: sanguinous     Odor: none    Pain:  minimal , dull          Intervention:     Patient's chart evaluated.      Cares performed:    Orders  Written    Supplies  Reviewed    Discussed plan of care with Patient and Nurse          Assessment:   Moisture  associated dermatitis to perineum         Plan:   Nursing to notify the Provider(s) and re-consult the WO Nurse if skin deteriorate(s).    Skin care plan for Perinuem: Daily and as needde :  Cleanse with Microklenz, apply No sting to any red or irritated skin, let this air dry and if any open areas noted apply second  coat of No sting and allow to dry before skin comes in contact with other surfaces    WOC Nurse will return: weekly  Face to face time: 15 minutes

## 2017-02-16 NOTE — PROGRESS NOTES
[facility]   Acute Rehabilitation Unit  Daily progress note    interval history  Tad Manning was seen and examined at bedside. No issues overnight; slept well - interrupted by repositioning and nursing care. Reports some pain around his thoracic incision. Discussed about his right foot chronic ulcer which was managed at podiatry clinic prior to admission.     No labs today; PVRs slightly elevated. Stared lovenox 40 daily for DVT prophylaxis (clear by neurosurgery team). Was seen by WOCN for pressure ulcer at coccygeal area - appreciate recs.     Started his rehab course today. Per initial eval ELOS will be 3-4 weeks. Ordered SLP consult given his low score on SLUMS.       medications    [START ON 2/17/2017] enoxaparin  40 mg Subcutaneous Q24H     ascorbic acid (VITAMIN C) tablet 500 mg  500 mg Oral Daily     aspirin  325 mg Oral Daily     atenolol  25 mg Oral Daily     atorvastatin  40 mg Oral Daily     cefTRIAXone  2 g Intravenous Q12H     cholecalciferol  1,000 Units Oral Daily     insulin glargine  12 Units Subcutaneous QAM AC     tamsulosin  0.8 mg Oral At Bedtime     insulin aspart  1-7 Units Subcutaneous TID AC     insulin aspart  1-5 Units Subcutaneous At Bedtime     lidocaine   Transdermal Q8H     heparin lock flush  5-10 mL Intracatheter Q24H     - MEDICATION INSTRUCTIONS -, acetaminophen, cyclobenzaprine, lidocaine, oxyCODONE, polyethylene glycol, senna-docusate, glucose **OR** dextrose **OR** glucagon, lidocaine, naloxone, sodium chloride (PF), heparin lock flush       physical exam  /64 (BP Location: Left arm)  Pulse 80  Temp 98  F (36.7  C) (Oral)  Resp 18  Ht 1.829 m (6')  Wt 90.3 kg (199 lb 1.6 oz)  SpO2 97%  BMI 27 kg/m2  Gen: NAD, resting in bed   HEENT: has glasses   Cardio: RRR  Pulm: clear breath sounds b/l   Abd: soft and non-tender   Ext: trace edema in bilateral lower extremities, no tenderness in calves   Neuro/MSK: alert, paraparesis,  diminished sensation in feet     Skin: thoracic surgical incision with sutures in place and mild surrounding erythema, right foot ulcer dry and intact at the lateral/plantar aspect of right foot 5x5mm    labs  No new labs today  BG variable     assessment and plan  Tad Manning is a 76 year old right hand dominant male with 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 Medical co-morbidities include post-op pain, anemia, hyponatremia, and h/o bacteremia on IV antibiotic. PMH significant for DM with peripheral neuropathy, HTN, HLD, CAD s/p CABG, systolic and diastolic heart failure, and CLL in remission.      Functional deficits are weakness in bilateral lower extremities, sensory loss in feet (due to diabetic neuropathy but worse likely due to SCI), neurogenic bladder and bowel, and acute on chronic deconditioning with poor endurance.         Admission to acute inpatient rehab 2/15.     Rehabilitation: please see interval history for updates. Ordered SLP on 2/16 given his low score on SLUMS 23/30.    Medical Conditions; appreciate hospitalist team assistance in medical management.      # Incomplete SCI:  # Thoracic osteomyelitis/discitis with epidural abscess:  # S/p T9-10 laminectomy, T7-12 posterior arthrodesis and TF right sided T9-10 fusion.   # Strep mitis bacteremia:      --TLSO for comfort  --wound care per recs; will remove sutures at 2 weeks higinio ~ 2/26  --PICC line care on the right arm   --pain management with lidoderm patch, prn tylenol, oxycodone and flexeril - will adjust as needed   --continue IV rocephin for total of 6 weeks - end date 3/24  --should f/u with ID and neurosurgery teams as outpatient      ** Check CBC, CRP, creatinine, and LFTs weekly while on therapy and have results faxed to the [facility] at [phone].      Hyponatremia: since 2/12 and stable ~ 131. 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 Will repeat labs on Monday.        Anemia: likely due to acute illness and blood loss; stable since his surgery. Will monitor clinically and repeat labs as needed.      # DM type II with neuropathy: on glipizide and victoza injections prior to admission. HgbA1C was 6.1% on 2/10. Currently on lantus 12 units daily and SSI.      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 Continue ASA and will restart plavix on 2/20.        # HTN: on amlodipine, atenolol, lisinopril and prinzide (?) prior to admission. Currently on atenolol 25 daily; BP has been in good range.         # HLD: on lipitor 40 daily. LDL was 48 in 5/2014 when last checked.         # CAD s/p CABG in 1995 with HFrEF (EF 40-45%): no active issues - continue ASA, lipitor and atenolol. Low threshold to repeat echo if any clinical s/s of heart failure.      # H/o CLL in remission: last chemo in 2015; is followed by oncology team - no active issues.      2. Adjustment to disability: Clinical psychology to eval and treat; will discuss with pt.   3. FEN: regular, mod consistent CHO.   4. Bowel: ? Neurogenic bowel - on prn bowel meds upon admission with last BM on 2/14. Will monitor and adjust meds/bowel regimen as needed.   5. Bladder: ? Neurogenic bladder - h/o BPH on flomax prior to admission; was not resumed after admission to the hospital likely due to low BP. Will check PVRs.   6. DVT Prophylaxis: mechanical; per neurosurgery team notes Ok to start chemical prophylaxis on 2/16 - will clarify. Discussed and ordered lovenox 40 daily starting 2/17.  7. GI Prophylaxis: none, oral intake.   8. Code: full.   9. Disposition: home vs TCU pending progress and family support.   10. ELOS:  3-4 weeks.   11. Follow up Appointments on Discharge: ID, neurosurgery, PCP and PM&R.   Follow-up with neurosurgery, Dr. Marcelo Trent MD in 2 weeks for wound check.        Follow-up with Infectious Disease, Dr. Gillian Vila, on 3/23/17. If you have not heard back regarding your appointment time please call [phone] and ask to page the Infectious Disease fellow regarding your pre-planned appointment with Dr. Vila on 3/23/17 to assess antibiotic regimen and progress     Hyun Koehler MD  Physical Medicine & Rehabilitation     I spent a total of 30 minutes face-to-face or managing the care of Tad Manning. Over 50% of my time on the unit was spent counseling the patient and coordinating care. See note for details.

## 2017-02-16 NOTE — PLAN OF CARE
Problem: Goal/Outcome  Goal: Goal Outcome Summary  PT: Eval completed and treatment initiated. Patient present with decreased sensation and proprioception from knee distally and with poor strength overall in LEs. Required Max A x 2 for supine<>sit transfer and needed Min A-CGA to maintain his balance, tendency to lean to the L. Liko lift used at this time for transfers for safety, potential to start working on SB transfers if tolerable with back pain/surgery. Anticipate will need 4 week stay with continued skilled PT.

## 2017-02-16 NOTE — PROGRESS NOTES
Social Work Services Discharge Note      Patient Name:  Tad Manning     Anticipated Discharge Date:  2/15/17    Discharge Disposition:   ARU:  [facility]    Following MD:  Marcelo Trent     Pre-Admission Screening (PAS) online form has been completed.  The Level of Care (LOC) is:  Determined  Confirmation Code is:  N/A  Patient/caregiver informed of referral to [facility] for Pre-Admission Screening for skilled nursing facility (SNF) placement and to expect a phone call post discharge from SNF.     Additional Services/Equipment Arranged:  H.E. W/c ride set up to transport pt to [facility].      Patient / Family response to discharge plan:  Patient and pt's brotherSeferino, aware of discharge plan and in agreement.      Persons notified of above discharge plan:  Patient and pt's brother Seferino    Staff Discharge Instructions:  Please fax discharge orders and signed hard scripts for any controlled substances.  Please print a packet and send with patient.     CTS Handoff completed:  YES    Medicare Notice of Rights provided to the patient/family:  NO    DEMETRIUS Mendez  Coverage   [phone]

## 2017-02-16 NOTE — PROGRESS NOTES
" 02/16/17 1100   Quick Adds   Type of Visit Initial PT Evaluation   Living Environment   Lives With significant other   Living Arrangements apartment   Number of Stairs to Enter Home 0   Number of Stairs Within Home 0   Stair Railings at Home none   Transportation Available car;family or friend will provide   Living Environment Comment Patient reports that lives in apartment with sig other. Reports that he has no stairs but small thersholds that he needs to step over. Has 6'' step in garage area but also there is a ramp that he could use. Building is w/c accessible. Reports that he rented a wheelchair in the past and he was able to get through his BA doorway with it. Has to walk ~150' to the elevator from apartment.    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 Reports that his house varying greatly, has to drive to locations for his work though.    Functional Level Prior   Ambulation 0-->independent   Transferring 0-->independent   Toileting 0-->independent   Cognition 1 - attention or memory deficits   Fall history within last six months yes   Number of times patient has fallen within last six months 4   Prior Functional Level Comment Patient was independent with all mobility prior to back surgery. Patient was using the walker more prior as she was feeling weaker in his legs. But reports that he was not using it all the times. Patient has had falls from \"legs giving out\" at home.    General Information   Onset of Illness/Injury or Date of Surgery - Date 02/15/17   Referring Physician Hyun Koehler MD   Patient/Family Goals Statement patient reports that he wants to walk, ,move around independently and drive    Pertinent History of Current Problem (include personal factors and/or " comorbidities that impact the POC) Tad Manning is a 76-year-old  gentleman transferred from the [facility] after T7-T12 posterior spinal segmental instrumentation and T9-T10 interbody fusion and posterior arthrodesis T70-T12 for a spinal abscess with osteomyelitis and diskitis. Patient with weakness in B LEs since surgery.    Precautions/Limitations spinal precautions   Weight-Bearing Status - LUE other (see comments)  (<10 lbs)   Weight-Bearing Status - RUE other (see comments)  (<10lbs)   Weight-Bearing Status - LLE weight-bearing as tolerated   Weight-Bearing Status - RLE weight-bearing as tolerated   Heart Disease Risk Factors Diabetes;Overweight;Gender;Age;Lack of physical activity   General Observations Received supine and agreeable to PT evaluation. In bed, patient was leaning over to his L side with difficulty adjusting to sit up on his own.   Cognitive Status Examination   Orientation orientation to person, place and time   Level of Consciousness alert   Follows Commands and Answers Questions 100% of the time   Personal Safety and Judgment intact   Memory impaired   Cognitive Comment Difficulty organizing thoughts during evaluation and staying on task.    Pain Assessment   Patient Currently in Pain Yes, see Vital Sign flowsheet   Integumentary/Edema   Integumentary/Edema Comments small foot ulcer on near R 5th tarsal, no edema noted in UE or LEs    Posture    Posture Comments foward shoulder posture, leaning to L when sititng EOB and supine bed.    Range of Motion (ROM)   ROM Comment DF to neutral otherwise functional ROM in B LE and UE with PROM ,   Strength   Manual Muscle Testing Quick Adds MMT: Hip;MMT: Knee;MMT: Ankle   MMT: Hip, Rehab Eval   Hip Flexion - Left Side (2+/5) poor plus, left   Hip Extension - Left Side (2+/5) poor plus, left   Hip ABduction - Left Side (2+/5) poor plus, left   Hip ADduction - Left Side (3-/5) fair minus, left   Hip  Flexion - Right Side (2/5) poor, right   Hip Extension - Right Side (2/5) poor, right   Hip ABduction - Right Side (2/5) poor, right   Hip ADduction - Right Side (2+/5) poor plus, right   MMT: Knee, Rehab Eval   Knee Flexion - Left Side (3-/5) fair minus, left   Knee Extension - Left Side (3-/5) fair minus, left   Knee Flexion - Right Side (2+/5) poor plus, right   Knee Extension - Right Side (2+/5) poor plus, right   MMT: Ankle, Rehab Eval   Ankle Dorsiflexion - Left Side (3+/5) fair plus, left   Ankle Plantarflexion - Left Side (4/5) good, left   Ankle Dorsiflexion - Right Side (3+/5) fair plus, left   Ankle Plantarflexion - Right Side (3/5) fair, left   Balance   Balance Comments fair seated balance, needed Min A-CGA to maintain his balance sitting EOB.   Sensory Examination   Sensory Perception Comments severe sensory deficits in LE in L4, L5, S1 distrbution, unable to sense light touch in these areas with testing. Reports that he feels these deficits were present before UE appears to be intact excep that patient has tingling/numbness in fingers from DM.   Sensory Perception Quick Adds Light touch;Proprioception   Sensation Light Touch   LUE other (see comments)  (reports tingling in fingers )   LLE severe impairment   RUE other (see comments)  (other (see comments))   RLE severe impairment   Proprioception    LLE moderate impairment  (not intact at great toe or forefoot, intact at ankle )   RLE moderate impairment  (not intact at great toe or forefoot, intact at ankle )   Coordination   Coordination Comments WFL   Muscle Tone   Muscle Tone Comments normal tone in UE and LEs    Modality Interventions   Planned Modality Interventions Microcurrent Electrical Stimulation;Cryotherapy   General Therapy Interventions   Planned Therapy Interventions balance training;bed mobility training;gait training;neuromuscular re-education;manual therapy;prosthetic fitting/training;ROM;strengthening;stretching;transfer  training;wheelchair management/propulsion training;home program guidelines;progressive activity/exercise;risk factor education   Clinical Impression   Criteria for Skilled Therapeutic Intervention yes, treatment indicated   PT Diagnosis decreased functional mobility    Influenced by the following impairments weakness, proprioception and sensation deficits, balance impairments, decreased activity tolerance, pain, cognition    Functional limitations due to impairments decreased independence with functional mobility, unable to drive for work, unable to perform ADLs and other household tasks.    Clinical Presentation Evolving/Changing   Clinical Presentation Rationale chart review, co-morbidites, changing pain and strength, observaiton    Clinical Decision Making (Complexity) Moderate complexity   Therapy Frequency` daily   Predicted Duration of Therapy Intervention (days/wks) 4/16/17   Anticipated Equipment Needs at Discharge wheelchair;shower chair;reacher   Anticipated Discharge Disposition Home with Assist;Home with Home Therapy   Risk & Benefits of therapy have been explained Yes   Patient, Family & other staff in agreement with plan of care Yes   Total Evaluation Time   Total Evaluation Time (Minutes) 45

## 2017-02-16 NOTE — PLAN OF CARE
Problem: Goal/Outcome  Goal: Goal Outcome Summary  OT: Rogelio completed. Pt is assist fo 2 for bed mobility and liko lift for transfers. Pt has poor sitting balance with posterior lean when unsupported. Cognitive deficits noted and pt scored a 23/30 on the SLUMS indicating mild/moderate deficits. Recommend SLP involvement. Anticipate 3-4 week stay with continued therapy.

## 2017-02-16 NOTE — PLAN OF CARE
Problem: Goal Outcome Summary  Goal: Goal Outcome Summary  Physical Therapy Discharge Summary     Reason for therapy discharge:    Discharged to acute rehabilitation facility.     Progress towards therapy goal(s). See goals on [facility] record for goal details.  Goals not met.  Barriers to achieving goals:   discharge from facility.     Therapy recommendation(s):    Continued therapy is recommended.  Rationale/Recommendations:  Continue strength, endurance, balance, and progress to gait training in ARU setting.

## 2017-02-16 NOTE — PROGRESS NOTES
02/16/17 0934   Quick Adds   Type of Visit Initial Occupational Therapy Evaluation   Living Environment   Lives With significant other   Living Arrangements apartment   Home Accessibility (small apt, narrow spaces)   Number of Stairs to Enter Home 0   Number of Stairs Within Home 0   Transportation Available car;family or friend will provide   Living Environment Comment Pt lives in small apt and he is unsure a WC will fit in there. Pt reports he has a walk inshower with no DME. Pt has SO who works during the day. Pt has rented a WC in the past but obregon snot remember if he was able to maneuver it inside. Pt reports it was mainly for out of home use.    Self-Care   Dominant Hand right   Usual Activity Tolerance moderate   Current Activity Tolerance poor   Equipment Currently Used at Home walker, rolling   Activity/Exercise/Self-Care Comment Pt was using wlker sometimes. Pt has no other DME. Pt was I at baseline. 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    Functional Level Prior   Ambulation 0-->independent   Transferring 0-->independent   Toileting 0-->independent   Bathing 0-->independent   Dressing 0-->independent   Eating 0-->independent   Communication 0-->understands/communicates without difficulty   Swallowing 0-->swallows foods/liquids without difficulty   Cognition 0 - no cognition issues reported   Fall history within last six months yes   Number of times patient has fallen within last six months 4   Prior Functional Level Comment PLOF was I and pt was working. Pt has not driven recently due to back pain. Pt manages at home he does his own laundry and cooks. Pt reports he has not driven in 3 weeks. Pt drives log distances for work.    General Information   Onset of Illness/Injury or Date of Surgery - Date 02/07/17   Referring Physician DR Koehler   Patient/Family Goals Statement to  be able to do things on his own including driving.    Additional Occupational Profile  "Info/Pertinent History of Current Problem Per chart review \"originally seen at [facility] on 2/7 following a fall at his home. This was the most recent in a series of increasingly frequent falls that were associated with bilateral leg weakness and lower back pain. 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 On 2/9 the patient was transferred to the [facility] for urgent surgical decompression of the spinal cord. Immediately prior to the operation, the patient was unable to move his right LE and had minimal movement of his left LE. On admission to acute rehab, the patient has some improvement in symptoms as he is able to move both of his legs but still has significant weakness below his baseline. Reports pain around the incision and surgery site which is well managed with pain meds. \"   Precautions/Limitations fall precautions;spinal precautions   Weight-Bearing Status - LUE (10# restriction)   Weight-Bearing Status - RUE (10# restriction)   General Observations Pt in bed using urinal upon arrival.    Cognitive Status Examination   Orientation person   Level of Consciousness alert   Memory impaired   Cognitive Comment Will continue to assess. Pt reports his memeory has not been as good and he was inconsistent with details of his home set up despite living there fro 3 years. Pt wasvague about where he is now. Will continue to monitor and assess. May need SLP invovlement since he was high functioning. Pt unablet o remember specific spinal precaution but did remember BLT.   Visual Perception   Visual Perception Comments denies visual changes. Pt wears glasses but able to read board and clock on wall and menu   Sensory Examination   Sensory Comments diabetic neuropathy at baseline. Pt reports walking on pa sensation in feet and tips of fingers numb and sensitive " to temp.    Pain Assessment   Patient Currently in Pain Yes, see Vital Sign flowsheet  (back 4/10)   Integumentary/Edema   Integumentary/Edema Comments UE edema, watch is tight on the L side and bracelets is tright on the R side.    Posture   Posture Comments poor sitting posture, MIN A for sitting balance with posterior lean   Range of Motion (ROM)   ROM Comment BUE WFL   Strength   Strength Comments UE not tested due to spinal precautions. LE deficits with R LE beign weaker than L. Pt unabel to lift either up off the bed. Pt is able to wiggle feet.    Hand Strength   Hand Strength Comments moderate  strength   Coordination   Coordination Comments Pt reports he has managed with utencils, able to oppse thumb and fingers.    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[place], driving   Activities of Daily Living Analysis   Impairments Contributing to Impaired Activities of Daily Living balance impaired;cognition impaired;pain;post surgical precautions;sensation decreased;strength decreased   General Therapy Interventions   Planned Therapy Interventions ADL retraining;IADL retraining;cognition;strengthening;transfer training;progressive activity/exercise   Clinical Impression   Criteria for Skilled Therapeutic Interventions Met yes, treatment indicated   OT Diagnosis ADl and IADL deficits   Influenced by the following impairments baalnce, strength, cognition, endurance, pain, post surgical precautions, sensation   Assessment of Occupational Performance 5 or more Performance Deficits   Identified Performance Deficits Pt's deficits effect all aspects of the pt's life. He currentyl needs assist with all ADL (bathing, dressing, grooming, toileting, all  IADL inculding driving, return to work and participation in activitites with his SO.    Clinical Decision Making (Complexity) High complexity   Therapy Frequency daily   Predicted Duration of Therapy Intervention (days/wks) 3-4 weeks   Anticipated Equipment Needs at Discharge (TBD)   Anticipated Discharge Disposition Home with Assist;Home with Home Therapy   Risks and Benefits of Treatment have been explained. Yes   Patient, Family & other staff in agreement with plan of care Yes   Total Evaluation Time   Total Evaluation Time (Minutes) 30

## 2017-02-16 NOTE — PROGRESS NOTES
Patient was discharged to [facility] within the system so they will handle post discharge follow up cares and coordination    [facility]   Acute Rehabilitation Unit

## 2017-02-16 NOTE — PROGRESS NOTES
"CLINICAL NUTRITION SERVICES - ASSESSMENT NOTE     Nutrition Prescription    RECOMMENDATIONS FOR MDs/PROVIDERS TO ORDER:  None    Malnutrition Status:    Severe malnutrition in the context of acute illness    Recommendations already ordered by Registered Dietitian (RD):  Ensure Plus BID    Future/Additional Recommendations:  Continue consistent carb diet + supplements. Consider diet liberalization if intake remains poor and blood sugars are in good control. Encourage intake of meals/supplements/fluids to support adequate nutrition and healing.     REASON FOR ASSESSMENT  Tad Manning is a/an 76 year old male assessed by the dietitian for Admission Nutrition Risk Screen for unintentional loss of 10# or more in the past two months    NUTRITION HISTORY  - Patient is on a regular diet at home. He reports intake is variable, however has overall been decreased over the past few months. He lives with a good friend and they take turns cooking. Pt reports he has recently been drinking Ensure at home to help combat poor appetite/ intake. He likes to mix it with skim milk to counter the sweetness. Pt states he does try and check his blood sugar at home, however is not consistent.  - Patient has been followed by RD throughout hospital course. Appetite/ intake has been decreased throughout hospital stay. Pt has been drinking some supplements; he states that he prefers the Ensure Plus over the Glucerna.    CURRENT NUTRITION ORDERS  Diet: Moderate Consistent Carbohydrate  Intake/Tolerance: Pt states food is starting to taste better, however intake remains decreased. He has been ordering small meals TID; intakes have been 25%. He is agreeable to Ensure Plus between meals.    LABS  Labs reviewed  Hgb A1C: 6.1 (2/10)    MEDICATIONS  Medications reviewed  Vitamin C  Vitamin D    ANTHROPOMETRICS  Height: 182.9 cm (6' 0\")  Most Recent Weight: 90.3 kg (199 lb 1.6 oz)    IBW: 78.8 kg  BMI: Overweight BMI 25-29.9  Weight History: Pt " reports a 40 lb weight loss over the past few months. Chart review is consistent with weight loss, however current weight is significantly elevated above recent trends; question accuracy of current versus historical weights. Pt estimates his current weight to be approx 195 lbs which is somewhat consistent with current weight. Based on weight data, pt has lost at least 9.5% of his body weight in the past 4 months.  Wt Readings from Last 10 Encounters:   02/15/17 90.3 kg (199 lb 1.6 oz)   02/07/17 81.2 kg (179 lb)   01/24/17 85.7 kg (188 lb 15 oz)   12/29/16 95.3 kg (210 lb)   12/22/16 95.3 kg (210 lb)   10/27/16 99.8 kg (220 lb)   10/13/16 99.3 kg (219 lb)   09/22/16 99.3 kg (219 lb)   09/08/16 99.3 kg (219 lb)   08/23/16 99.3 kg (219 lb)       Dosing Weight: 90 kg (current weight)    ASSESSED NUTRITION NEEDS  Estimated Energy Needs: 6035-3500 kcals/day (25 - 30 kcals/kg)  Justification: Maintenance  Estimated Protein Needs: 108-135 grams protein/day (1.2 - 1.5 grams of pro/kg)  Justification: Post-op and Wound healing  Estimated Fluid Needs: 1 mL/kcal or per MD goals   Justification: Maintenance    PHYSICAL FINDINGS  See malnutrition section below.  Suspected pressure injury on coccyx    MALNUTRITION  % Intake: </=75% for >/= 1 month (severe)  % Weight Loss: > 7.5% in 3 months (severe)  Subcutaneous Fat Loss: None observed  Muscle Loss: Thoracic region (clavicle, acromium bone, deltoid, trapezius, pectoral):  Mild/moderate and Upper arm (bicep, tricep):  Mild/moderate  Fluid Accumulation/Edema: None noted  Malnutrition Diagnosis: Severe malnutrition in the context of acuate illness    NUTRITION DIAGNOSIS  Inadequate oral intake related to decreased appetite as evidenced by intake of 25% of meals with noted significant weight loss over the past few months.      INTERVENTIONS  Implementation  Nutrition Education: review current diet order and rationale (moderate consistent carbohydrate). Discuss importance of good  nutritional intakes to support healing   Medical food supplement therapy: Ensure Plus BID between meals    Goals  Patient to consume % of nutritionally adequate meal trays TID, or the equivalent with supplements/snacks.    Monitoring/Evaluation  Progress toward goals will be monitored and evaluated per protocol.      Frances Marie RD

## 2017-02-17 LAB
GLUCOSE BLDC GLUCOMTR-MCNC: 178 MG/DL (ref 70–99)
GLUCOSE BLDC GLUCOMTR-MCNC: 196 MG/DL (ref 70–99)
GLUCOSE BLDC GLUCOMTR-MCNC: 208 MG/DL (ref 70–99)
GLUCOSE BLDC GLUCOMTR-MCNC: 226 MG/DL (ref 70–99)
GLUCOSE BLDC GLUCOMTR-MCNC: 243 MG/DL (ref 70–99)
GLUCOSE BLDC GLUCOMTR-MCNC: 287 MG/DL (ref 70–99)
PLATELET # BLD AUTO: 189 10E9/L (ref 150–450)

## 2017-02-17 PROCEDURE — 40000193 ZZH STATISTIC PT WARD VISIT: Performed by: PHYSICAL THERAPIST

## 2017-02-17 PROCEDURE — 40000133 ZZH STATISTIC OT WARD VISIT

## 2017-02-17 PROCEDURE — 97110 THERAPEUTIC EXERCISES: CPT | Mod: GP | Performed by: PHYSICAL THERAPIST

## 2017-02-17 PROCEDURE — 25000132 ZZH RX MED GY IP 250 OP 250 PS 637: Mod: GY | Performed by: PHYSICAL MEDICINE & REHABILITATION

## 2017-02-17 PROCEDURE — 25000125 ZZHC RX 250: Performed by: PHYSICAL MEDICINE & REHABILITATION

## 2017-02-17 PROCEDURE — A9270 NON-COVERED ITEM OR SERVICE: HCPCS | Mod: GY | Performed by: PHYSICAL MEDICINE & REHABILITATION

## 2017-02-17 PROCEDURE — 25000128 H RX IP 250 OP 636: Performed by: INTERNAL MEDICINE

## 2017-02-17 PROCEDURE — 36592 COLLECT BLOOD FROM PICC: CPT | Performed by: PHYSICAL MEDICINE & REHABILITATION

## 2017-02-17 PROCEDURE — 97535 SELF CARE MNGMENT TRAINING: CPT | Mod: GO

## 2017-02-17 PROCEDURE — 12800006 ZZH R&B REHAB

## 2017-02-17 PROCEDURE — 25000132 ZZH RX MED GY IP 250 OP 250 PS 637: Mod: GY | Performed by: INTERNAL MEDICINE

## 2017-02-17 PROCEDURE — 40000193 ZZH STATISTIC PT WARD VISIT

## 2017-02-17 PROCEDURE — 25000128 H RX IP 250 OP 636: Performed by: PHYSICAL MEDICINE & REHABILITATION

## 2017-02-17 PROCEDURE — 85049 AUTOMATED PLATELET COUNT: CPT | Performed by: PHYSICAL MEDICINE & REHABILITATION

## 2017-02-17 PROCEDURE — 97110 THERAPEUTIC EXERCISES: CPT | Mod: GP

## 2017-02-17 PROCEDURE — 97530 THERAPEUTIC ACTIVITIES: CPT | Mod: GP | Performed by: PHYSICAL THERAPIST

## 2017-02-17 PROCEDURE — 97530 THERAPEUTIC ACTIVITIES: CPT | Mod: GO

## 2017-02-17 PROCEDURE — 00000146 ZZHCL STATISTIC GLUCOSE BY METER IP

## 2017-02-17 PROCEDURE — A9270 NON-COVERED ITEM OR SERVICE: HCPCS | Mod: GY | Performed by: INTERNAL MEDICINE

## 2017-02-17 PROCEDURE — 99232 SBSQ HOSP IP/OBS MODERATE 35: CPT | Performed by: INTERNAL MEDICINE

## 2017-02-17 RX ORDER — POLYETHYLENE GLYCOL 3350 17 G/17G
17 POWDER, FOR SOLUTION ORAL DAILY
Status: DISCONTINUED | OUTPATIENT
Start: 2017-02-17 | End: 2017-02-19

## 2017-02-17 RX ORDER — BISACODYL 10 MG
10 SUPPOSITORY, RECTAL RECTAL DAILY PRN
Status: DISCONTINUED | OUTPATIENT
Start: 2017-02-17 | End: 2017-03-19 | Stop reason: HOSPADM

## 2017-02-17 RX ORDER — AMOXICILLIN 250 MG
1 CAPSULE ORAL 2 TIMES DAILY
Status: DISCONTINUED | OUTPATIENT
Start: 2017-02-17 | End: 2017-02-19

## 2017-02-17 RX ADMIN — OXYCODONE HYDROCHLORIDE 10 MG: 5 TABLET ORAL at 22:32

## 2017-02-17 RX ADMIN — SODIUM CHLORIDE, PRESERVATIVE FREE 5 ML: 5 INJECTION INTRAVENOUS at 19:26

## 2017-02-17 RX ADMIN — SODIUM CHLORIDE, PRESERVATIVE FREE 5 ML: 5 INJECTION INTRAVENOUS at 01:36

## 2017-02-17 RX ADMIN — INSULIN ASPART 2 UNITS: 100 INJECTION, SOLUTION INTRAVENOUS; SUBCUTANEOUS at 14:39

## 2017-02-17 RX ADMIN — INSULIN GLARGINE 12 UNITS: 100 INJECTION, SOLUTION SUBCUTANEOUS at 08:17

## 2017-02-17 RX ADMIN — OXYCODONE HYDROCHLORIDE 10 MG: 5 TABLET ORAL at 11:19

## 2017-02-17 RX ADMIN — SODIUM CHLORIDE, PRESERVATIVE FREE 5 ML: 5 INJECTION INTRAVENOUS at 05:23

## 2017-02-17 RX ADMIN — INSULIN ASPART 1 UNITS: 100 INJECTION, SOLUTION INTRAVENOUS; SUBCUTANEOUS at 22:37

## 2017-02-17 RX ADMIN — OXYCODONE HYDROCHLORIDE 10 MG: 5 TABLET ORAL at 14:58

## 2017-02-17 RX ADMIN — INSULIN ASPART 3 UNITS: 100 INJECTION, SOLUTION INTRAVENOUS; SUBCUTANEOUS at 17:22

## 2017-02-17 RX ADMIN — ATORVASTATIN CALCIUM 40 MG: 40 TABLET, FILM COATED ORAL at 08:14

## 2017-02-17 RX ADMIN — CEFTRIAXONE 2 G: 2 INJECTION, POWDER, FOR SOLUTION INTRAMUSCULAR; INTRAVENOUS at 13:07

## 2017-02-17 RX ADMIN — CYCLOBENZAPRINE HYDROCHLORIDE 5 MG: 5 TABLET, FILM COATED ORAL at 14:58

## 2017-02-17 RX ADMIN — TAMSULOSIN HYDROCHLORIDE 0.8 MG: 0.4 CAPSULE ORAL at 19:26

## 2017-02-17 RX ADMIN — CEFTRIAXONE 2 G: 2 INJECTION, POWDER, FOR SOLUTION INTRAMUSCULAR; INTRAVENOUS at 00:43

## 2017-02-17 RX ADMIN — OXYCODONE HYDROCHLORIDE 10 MG: 5 TABLET ORAL at 05:58

## 2017-02-17 RX ADMIN — ATENOLOL 25 MG: 25 TABLET ORAL at 08:14

## 2017-02-17 RX ADMIN — POLYETHYLENE GLYCOL 3350 17 G: 17 POWDER, FOR SOLUTION ORAL at 19:25

## 2017-02-17 RX ADMIN — OXYCODONE HYDROCHLORIDE AND ACETAMINOPHEN 500 MG: 500 TABLET ORAL at 08:14

## 2017-02-17 RX ADMIN — OXYCODONE HYDROCHLORIDE 10 MG: 5 TABLET ORAL at 19:33

## 2017-02-17 RX ADMIN — ENOXAPARIN SODIUM 40 MG: 40 INJECTION SUBCUTANEOUS at 08:17

## 2017-02-17 RX ADMIN — SENNOSIDES AND DOCUSATE SODIUM 1 TABLET: 8.6; 5 TABLET ORAL at 19:26

## 2017-02-17 RX ADMIN — VITAMIN D, TAB 1000IU (100/BT) 1000 UNITS: 25 TAB at 08:14

## 2017-02-17 RX ADMIN — ASPIRIN 325 MG ORAL TABLET 325 MG: 325 PILL ORAL at 08:14

## 2017-02-17 RX ADMIN — INSULIN ASPART 2 UNITS: 100 INJECTION, SOLUTION INTRAVENOUS; SUBCUTANEOUS at 09:51

## 2017-02-17 NOTE — PLAN OF CARE
Problem: Goal/Outcome  Goal: Goal Outcome Summary  OT: Session focused on shower and full body dressing. Liko lift and dependent shower chair utilized. Pt required min A with UB dressing and max A with LB dressing.

## 2017-02-17 NOTE — PLAN OF CARE
Problem: Goal/Outcome  Goal: Goal Outcome Summary  PT: pt with fair participation today.  During pm session, unable to tolerate sitting from OT to PT session, dt/ pain/discomfort.  Educated on importance of spending increased time OOB.  Cont per POC, progress as tolerated.

## 2017-02-17 NOTE — PROGRESS NOTES
No cp or sob   No fever ro hcills  No nausea or vomiting  Bg bit up  On Exam ;      Alert and oriented . No acute distress  Vital signs:  Temp: 98  F (36.7  C) Temp src: Oral BP: 153/80 Pulse: 90   Resp: 18 SpO2: 97 % O2 Device: None (Room air)   Height: 182.9 cm (6') Weight: 90.3 kg (199 lb 1.6 oz)  Estimated body mass index is 27 kg/(m^2) as calculated from the following:    Height as of this encounter: 1.829 m (6').    Weight as of this encounter: 90.3 kg (199 lb 1.6 oz).     Neck ; supple , no JVD. PICC line in place  Chest ; equal BS bilaterally , no rales or rhonchi   CVS; RRR, no murmur /rubs or gallops  GI ; soft abdomen, non tender, BS positive  Ext ; no edema , no cynosis  Psych ; appropriate mood and effect    Labs; none   A/P  Tad Manning is a 76-year-old  gentleman transferred from the [facility] after T7-T12 posterior spinal segmental instrumentation and T9-T10 interbody fusion and posterior arthrodesis T70-T12 for a spinal abscess with osteomyelitis and diskitis ON 2/15  1. 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 The patient should have a CBC, CRP, creatinine and LFTs done weekly. Followup with Infectious Disease, Dr. Gillian Max on 03/23/2017.   2. Diabetes mellitus: Continue the Lantus at night along with sliding scale insulin.   Increase lantus to 14 units 2/17  3. Peripheral vascular disease, status post bilateral lower extremity stents: Continue aspirin and Plavix.( to be started 2/20)   4. Chronic lymphocytic leukemia in remission, last chemo was 09/2015: Follow up with Oncology.   5. Coronary artery disease: Continue aspirin 325 mg daily, Lipitor 40 mg daily, atenolol 25 mg daily.   6. Spinal cord compression with lower extremity weakness: Primary team to address  7.DVT prophylaxis ; lovenox started  2/16  Discussed with PMR

## 2017-02-17 NOTE — PLAN OF CARE
Problem: Goal/Outcome  Goal: Goal Outcome Summary  FOCUS/GOAL  Bladder management, Nutrition/Feeding/Swallowing precautions and Wound care management     ASSESSMENT, INTERVENTIONS AND CONTINUING PLAN FOR GOAL:  Patient is alert/oriented and has been using the call light appropriately for medications.  Patient did not get breakfast before OT brought patient to shower room, BG was checked after he returned but patient did not want to order breakfast at that time.  Patient later ordered breakfast per self at around 10am so BG was checked again at that time and was 208 so ss coverage was given.  Patient denies wanting lunch now, will re-approach one more time before end of shift and f/u with ss if needed.  Patient has redness surrounding perineum and lower buttocks, per WOC orders applying skin prep to site, continue to monitor for changes.  Patient has been voiding spontaneously on this shift using the urinal, has needed some set up help because of dribbling, PVR done x1 so far and was 135 after voiding 150, patient educated on double voiding and tried to later but was unable to, continue to f/u with monitoring.     ADDENDUM: pt was up in w/c with therapy so was not able to do f/u bladder scan, had 100 out at 2:48pm--oncoming nurse aware and will f/u.  Patient also reported low appetite, will be on meal with assist starting tomorrow. MD notified of appetite and also regarding residual bladder.  No BM on this shift, pt receiving PRN oxycodone approx q4 hours, MD notified and patient may start on scheduled bowel meds going forward.

## 2017-02-17 NOTE — PROGRESS NOTES
"  [facility]   Acute Rehabilitation Unit  Daily progress note    interval history  Tad Manning was seen and examined at bedside. Reclining in bed, pt reports he had had difficulty positioning himself comfortably, knows some repositioning due to trying to avoid bedsores. States his mood is very positive and he has enjoyed working with staff.    Functionally,    \"OT: Session focused on shower and full body dressing. Liko lift and dependent shower chair utilized. Pt required min A with UB dressing and max A with LB dressing.\"      Medications  Current Facility-Administered Medications   Medication     Patient is already receiving anticoagulation with heparin, enoxaparin (LOVENOX), warfarin (COUMADIN)  or other anticoagulant medication     acetaminophen (TYLENOL) tablet 650 mg     ascorbic acid (VITAMIN C) tablet 500 mg     aspirin tablet 325 mg     atenolol (TENORMIN) tablet 25 mg     atorvastatin (LIPITOR) tablet 40 mg     cefTRIAXone (ROCEPHIN) 2 g vial to attach to  ml bag for ADULTS or NS 50 ml bag for PEDS     cholecalciferol (vitamin D) tablet 1,000 Units     cyclobenzaprine (FLEXERIL) tablet 5 mg     insulin glargine (LANTUS) injection 12 Units     lidocaine (LIDODERM) 5 % Patch 1 patch     oxyCODONE (ROXICODONE) IR tablet 5-10 mg     polyethylene glycol (MIRALAX/GLYCOLAX) Packet 17 g     senna-docusate (SENOKOT-S;PERICOLACE) 8.6-50 MG per tablet 1-2 tablet     tamsulosin (FLOMAX) capsule 0.8 mg     glucose 40 % gel 15-30 g    Or     dextrose 50 % injection 25-50 mL    Or     glucagon injection 1 mg     insulin aspart (NovoLOG) inj (RAPID ACTING)     lidocaine (LIDODERM) patch REMOVAL     lidocaine (LIDODERM) Patch in Place     naloxone (NARCAN) injection 0.1-0.4 mg     sodium chloride (PF) 0.9% PF flush 10-20 mL     heparin lock flush 10 UNIT/ML injection 5-10 mL        physical exam  /80  Pulse 90  Temp 98  F (36.7  C) (Oral)  Resp 18  Ht 1.829 m (6')  " Wt 90.3 kg (199 lb 1.6 oz)  SpO2 97%  BMI 27 kg/m2  Gen: Well appearing man, reclined in bed at time of exam. Pleasant, appropriately alert and oriented. In no acute distress, but having trouble positioning himself comfortably.  Cardio: RRR, no murmurs  Pulm: Llungs clear to auscultation  Ext: chronic wound lateral/plantar R foot, appears stable  Neuro/MSK:    Mental Status: alert and oriented x3    Sensory: absent to light touch in toes and plantar aspects of feet b/l; also diminished to LT at finger tips b/l    Strength: Improving. symmetric, 2/5 hip flexion, 3/5 knee flexion and extension, 3/5 ankle dorsiflexion and toe extension.       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 Medical co-morbidities include post-op pain, anemia, hyponatremia, and h/o bacteremia on IV antibiotic. PMH significant for DM with peripheral neuropathy, HTN, HLD, CAD s/p CABG, systolic and diastolic heart failure, and CLL in remission.      Functional deficits are weakness in bilateral lower extremities, sensory loss in feet (due to diabetic neuropathy but worse likely due to SCI), neurogenic bladder and bowel, and acute on chronic deconditioning with poor endurance.         Admission to acute inpatient rehab 2/15.    Impairment of ADL's: OT for 90 min daily to work on self care, dressing, toileting, bathing, energy conservation techniques with use of ADs as needed.     Impairment of mobility:  PT for 90 min daily to work on strengthening, endurance buildup, transfers with use of assistive device; will likely require a WC at discharge pending his progress.    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 Spinal decompression performed 2/9, arthrodesis on 2/12. PICC placed 2/10, rocephin initiated 2/12. Evaluated by PT/OT inpatient and recommended for acute rehab. Pain management - pt reports improvement w/oxycodone and flexeril, prefers not to use Lidoderm due to cost.  -Continue IV ceftriaxone 6 weeks via PICC  -Pain management with prn acetaminophen, flexeril, and oxycodone   -Surgical wound care and PICC line care  -Check CBC, CRP, creatinine, and LFTs weekly, fax results to [facility] ([phone])     #DM type II with peripheral neuropathy  Previously on novolog and lantus for management. Most recent HgbA1C 6.1 on 2/10. BG levels 149-162 since admission.  -Continue lantus 12U qd and novolog sliding scale     #Anemia  Normocytic, appears chronic in nature. Stable since surgeries 2/9 and 2/12.  -Monitor clinically  -Repeat CBC as indicated     #Peripheral artery disease, s/p bilateral iliac artery stent placement, left LE bypass, amputation of L second toe  #CAD s/p CABG  #HTN  #Hyperlipidemia  -Continue current medication regimen: ASA, lipitor, atenolol  -Restart plavix 2/20     #CLL in remission  In remission since 2015     # Bowel and bladder  History of BPH, not restarted on Flomax due to hypotension.   -Monitor clinically and perform PVRs  -Prn bowel meds ordered     # FEN  One month ago, ARF (Cr 3.14) that improved with aggressive rehydration, Additionally, protein malnutrition with significant weight loss.  -Adequate hydration and nutrition  -Ensure Plus BID  between meals     # DVT prophyplaxis  -Initiated Lovenox 40mg qd, recommendation per neurosurgery team       Discharge Goals:  Estimated Length of Stay: 3-4 weeks  Prognosis: Good, given improvement in symptoms since surgery  Code Status: full  Anticipated D/C Destination and functional outcome: home vs TCU pending progress and family support.   Follow up Appointments on Discharge: neurosurgery Dr. Trent in 2 weeks, ID Dr. Vila on 3/23        Chad Zavala, MS-3    This patient was discussed with the attending physician, Dr. Hyun Koehler    I agree with the above note as completed by the Chad Zavala, MS-3. Please see my separate note for my key findings and decision makings.      Hyun Koehler MD  Physical Medicine & Rehabilitation

## 2017-02-17 NOTE — PLAN OF CARE
Problem: Goal/Outcome  Goal: Goal Outcome Summary  Outcome: Improving  FOCUS/GOAL  Medical management     ASSESSMENT, INTERVENTIONS AND CONTINUING PLAN FOR GOAL:  Patient slept between turning and repositioning, with maximal assistance of 2. He called for assistance as needed and able to verbalize his needs. He use urinal independently and staff emptied it. One PVR was 127.  C/O mid lower back pain, relief with repositioning. Suspected pressure ulcer on coccyx, applied Mepilex dressing, and reposiitioned patient to his sides most of the night. Double lumen PICC line right upper arm for IV antibiotics and blood draws.  Continue to assess and treat suspected pressure ulcer as needed. Continue to check PVR.  Gave patient Oxycodone at 0523 for mid back pain with relief.

## 2017-02-18 LAB
GLUCOSE BLDC GLUCOMTR-MCNC: 204 MG/DL (ref 70–99)
GLUCOSE BLDC GLUCOMTR-MCNC: 217 MG/DL (ref 70–99)
GLUCOSE BLDC GLUCOMTR-MCNC: 231 MG/DL (ref 70–99)
GLUCOSE BLDC GLUCOMTR-MCNC: 238 MG/DL (ref 70–99)
GLUCOSE BLDC GLUCOMTR-MCNC: 260 MG/DL (ref 70–99)

## 2017-02-18 PROCEDURE — 92523 SPEECH SOUND LANG COMPREHEN: CPT | Mod: GN | Performed by: SPEECH-LANGUAGE PATHOLOGIST

## 2017-02-18 PROCEDURE — 97112 NEUROMUSCULAR REEDUCATION: CPT | Mod: GP

## 2017-02-18 PROCEDURE — A9270 NON-COVERED ITEM OR SERVICE: HCPCS | Mod: GY | Performed by: INTERNAL MEDICINE

## 2017-02-18 PROCEDURE — 25000132 ZZH RX MED GY IP 250 OP 250 PS 637: Mod: GY | Performed by: PHYSICAL MEDICINE & REHABILITATION

## 2017-02-18 PROCEDURE — 00000146 ZZHCL STATISTIC GLUCOSE BY METER IP

## 2017-02-18 PROCEDURE — 97110 THERAPEUTIC EXERCISES: CPT | Mod: GO

## 2017-02-18 PROCEDURE — 25000125 ZZHC RX 250: Performed by: PHYSICAL MEDICINE & REHABILITATION

## 2017-02-18 PROCEDURE — 25000128 H RX IP 250 OP 636: Performed by: PHYSICAL MEDICINE & REHABILITATION

## 2017-02-18 PROCEDURE — 40000193 ZZH STATISTIC PT WARD VISIT

## 2017-02-18 PROCEDURE — 25000125 ZZHC RX 250: Performed by: INTERNAL MEDICINE

## 2017-02-18 PROCEDURE — 25000132 ZZH RX MED GY IP 250 OP 250 PS 637: Mod: GY | Performed by: INTERNAL MEDICINE

## 2017-02-18 PROCEDURE — A9270 NON-COVERED ITEM OR SERVICE: HCPCS | Mod: GY | Performed by: PHYSICAL MEDICINE & REHABILITATION

## 2017-02-18 PROCEDURE — 25000128 H RX IP 250 OP 636: Performed by: INTERNAL MEDICINE

## 2017-02-18 PROCEDURE — 25000131 ZZH RX MED GY IP 250 OP 636 PS 637: Mod: GY | Performed by: INTERNAL MEDICINE

## 2017-02-18 PROCEDURE — 40000133 ZZH STATISTIC OT WARD VISIT

## 2017-02-18 PROCEDURE — 97110 THERAPEUTIC EXERCISES: CPT | Mod: GO | Performed by: OCCUPATIONAL THERAPIST

## 2017-02-18 PROCEDURE — 97530 THERAPEUTIC ACTIVITIES: CPT | Mod: GP

## 2017-02-18 PROCEDURE — 40000225 ZZH STATISTIC SLP WARD VISIT: Performed by: SPEECH-LANGUAGE PATHOLOGIST

## 2017-02-18 PROCEDURE — 12800006 ZZH R&B REHAB

## 2017-02-18 PROCEDURE — 97110 THERAPEUTIC EXERCISES: CPT | Mod: GP

## 2017-02-18 PROCEDURE — 99232 SBSQ HOSP IP/OBS MODERATE 35: CPT | Performed by: INTERNAL MEDICINE

## 2017-02-18 PROCEDURE — 40000133 ZZH STATISTIC OT WARD VISIT: Performed by: OCCUPATIONAL THERAPIST

## 2017-02-18 RX ADMIN — Medication: at 21:22

## 2017-02-18 RX ADMIN — ENOXAPARIN SODIUM 40 MG: 40 INJECTION SUBCUTANEOUS at 09:17

## 2017-02-18 RX ADMIN — ATENOLOL 25 MG: 25 TABLET ORAL at 09:16

## 2017-02-18 RX ADMIN — SENNOSIDES AND DOCUSATE SODIUM 1 TABLET: 8.6; 5 TABLET ORAL at 21:17

## 2017-02-18 RX ADMIN — OXYCODONE HYDROCHLORIDE 10 MG: 5 TABLET ORAL at 21:16

## 2017-02-18 RX ADMIN — INSULIN ASPART 2 UNITS: 100 INJECTION, SOLUTION INTRAVENOUS; SUBCUTANEOUS at 07:02

## 2017-02-18 RX ADMIN — VITAMIN D, TAB 1000IU (100/BT) 1000 UNITS: 25 TAB at 09:17

## 2017-02-18 RX ADMIN — ATORVASTATIN CALCIUM 40 MG: 40 TABLET, FILM COATED ORAL at 09:16

## 2017-02-18 RX ADMIN — SODIUM CHLORIDE, PRESERVATIVE FREE 5 ML: 5 INJECTION INTRAVENOUS at 21:16

## 2017-02-18 RX ADMIN — SENNOSIDES AND DOCUSATE SODIUM 1 TABLET: 8.6; 5 TABLET ORAL at 09:16

## 2017-02-18 RX ADMIN — OXYCODONE HYDROCHLORIDE AND ACETAMINOPHEN 500 MG: 500 TABLET ORAL at 09:16

## 2017-02-18 RX ADMIN — INSULIN ASPART 2 UNITS: 100 INJECTION, SOLUTION INTRAVENOUS; SUBCUTANEOUS at 12:42

## 2017-02-18 RX ADMIN — INSULIN ASPART 1 UNITS: 100 INJECTION, SOLUTION INTRAVENOUS; SUBCUTANEOUS at 21:15

## 2017-02-18 RX ADMIN — OXYCODONE HYDROCHLORIDE 10 MG: 5 TABLET ORAL at 18:07

## 2017-02-18 RX ADMIN — POLYETHYLENE GLYCOL 3350 17 G: 17 POWDER, FOR SOLUTION ORAL at 09:17

## 2017-02-18 RX ADMIN — CEFTRIAXONE 2 G: 2 INJECTION, POWDER, FOR SOLUTION INTRAMUSCULAR; INTRAVENOUS at 12:43

## 2017-02-18 RX ADMIN — ASPIRIN 325 MG ORAL TABLET 325 MG: 325 PILL ORAL at 09:17

## 2017-02-18 RX ADMIN — CEFTRIAXONE 2 G: 2 INJECTION, POWDER, FOR SOLUTION INTRAMUSCULAR; INTRAVENOUS at 00:26

## 2017-02-18 RX ADMIN — INSULIN ASPART 3 UNITS: 100 INJECTION, SOLUTION INTRAVENOUS; SUBCUTANEOUS at 18:06

## 2017-02-18 RX ADMIN — OXYCODONE HYDROCHLORIDE 10 MG: 5 TABLET ORAL at 10:31

## 2017-02-18 RX ADMIN — TAMSULOSIN HYDROCHLORIDE 0.8 MG: 0.4 CAPSULE ORAL at 21:17

## 2017-02-18 NOTE — PLAN OF CARE
Problem: Goal/Outcome  Goal: Goal Outcome Summary  PT: pt with good participation today, however needing max motivation.  Initiated SB transfer today and w/c propulsion x180'.  Pt encouraged to be up in chair for all meals, cont LIKO lift with nursing at this time.  Cont per POC, progress as pt able.

## 2017-02-18 NOTE — PROGRESS NOTES
[facility]   Acute Rehabilitation Unit  Daily progress note    interval history  Tad Manning was seen and examined at bedside. Slept well last night; reports intermittent pain around his incision which is well controlled with tylenol and oxycodone. Does not use lidoderm patch so it was dc'd. Reports lightheadedness with changing position; overall improved since admission but still present. No new numbness, tingling or weakness.     BG in higher range, lantus was increased per hospitalist team. Ordered TEDs and abdominal binder; discussed with PT to check orthostatic BP which was negative. Pt agreeable with SLP consult. Mood is good and he is not interested in psych consult at this point. PVRs elevated; will continue to monitor.     Doing ok in therapies; limited by fatigue. Requires min A for upper body dressing and max A for lower.         medications    insulin glargine  14 Units Subcutaneous QAM AC     polyethylene glycol  17 g Oral Daily     senna-docusate  1 tablet Oral BID     enoxaparin  40 mg Subcutaneous Q24H     ascorbic acid (VITAMIN C) tablet 500 mg  500 mg Oral Daily     aspirin  325 mg Oral Daily     atenolol  25 mg Oral Daily     atorvastatin  40 mg Oral Daily     cefTRIAXone  2 g Intravenous Q12H     cholecalciferol  1,000 Units Oral Daily     tamsulosin  0.8 mg Oral At Bedtime     insulin aspart  1-7 Units Subcutaneous TID AC     insulin aspart  1-5 Units Subcutaneous At Bedtime     heparin lock flush  5-10 mL Intracatheter Q24H     bisacodyl, - MEDICATION INSTRUCTIONS -, acetaminophen, cyclobenzaprine, lidocaine, oxyCODONE, glucose **OR** dextrose **OR** glucagon, naloxone, sodium chloride (PF), heparin lock flush       physical exam  /64 (BP Location: Left arm)  Pulse 74  Temp 96  F (35.6  C) (Oral)  Resp 16  Ht 1.829 m (6')  Wt 90.3 kg (199 lb 1.6 oz)  SpO2 96%  BMI 27 kg/m2     Gen: NAD, resting in bed   HEENT: has glasses   Cardio:  RRR  Pulm: clear breath sounds b/l   Abd: soft and non-tender   Ext: trace edema in bilateral lower extremities, no tenderness in calves   Neuro/MSK: alert, paraparesis - strength improved distally as compared to day of admission, diminished sensation in feet     Skin: thoracic surgical incision with sutures in place and mild surrounding erythema, right foot ulcer dry and intact at the lateral/plantar aspect of right foot 5x5mm    labs  No new labs today  BG in higher range     assessment and plan  Tad Manning is a 76 year old right hand dominant male with 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 Medical co-morbidities include post-op pain, anemia, hyponatremia, and h/o bacteremia on IV antibiotic. PMH significant for DM with peripheral neuropathy, HTN, HLD, CAD s/p CABG, systolic and diastolic heart failure, and CLL in remission.      Functional deficits are weakness in bilateral lower extremities, sensory loss in feet (due to diabetic neuropathy but worse likely due to SCI), neurogenic bladder and bowel, and acute on chronic deconditioning with poor endurance.         Admission to acute inpatient rehab 2/15.     Rehabilitation: please see interval history for updates. Ordered SLP on 2/16 given his low score on SLUMS 23/30.    Medical Conditions; appreciate hospitalist team assistance in medical management.      # Incomplete SCI:  # Thoracic osteomyelitis/discitis with epidural abscess:  # S/p T9-10 laminectomy, T7-12 posterior arthrodesis and TF right sided T9-10 fusion.   # Strep mitis bacteremia:      --TLSO for comfort  --wound care per recs; will remove sutures at 2 weeks higinio ~ 2/26  --PICC line care on the right arm   --pain management with lidoderm patch, prn tylenol, oxycodone and flexeril - will adjust as needed. 2/17 dc'd lidoderm patch as he was refusing to use. Consider to add long  acting.  --continue IV rocephin for total of 6 weeks - end date 3/24  --should f/u with ID and neurosurgery teams as outpatient      ** Check CBC, CRP, creatinine, and LFTs weekly while on therapy and have results faxed to the [facility] at [phone].      Hyponatremia: since 2/12 and stable ~ 131. 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 Will repeat labs on Monday.        Anemia: likely due to acute illness and blood loss; stable since his surgery. Will monitor clinically and repeat labs as needed.      # DM type II with neuropathy: on glipizide and victoza injections prior to admission. HgbA1C was 6.1% on 2/10. Currently on lantus 12 units daily and SSI. 2/17 lantus was increased to 14 for better control.      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 Continue ASA and will restart plavix on 2/20.        # HTN: on amlodipine, atenolol, lisinopril and prinzide (?) prior to admission. Currently on atenolol 25 daily; BP has been in good range.         # HLD: on lipitor 40 daily. LDL was 48 in 5/2014 when last checked.         # CAD s/p CABG in 1995 with HFrEF (EF 40-45%): no active issues - continue ASA, lipitor and atenolol. Low threshold to repeat echo if any clinical s/s of heart failure.      # H/o CLL in remission: last chemo in 2015; is followed by oncology team - no active issues.      2. Adjustment to disability: Clinical psychology to eval and treat; will discuss with pt.   3. FEN: regular, mod consistent CHO.   4. Bowel: ? Neurogenic bowel - on prn bowel meds upon admission with last BM on 2/14. Will monitor and adjust meds/bowel regimen as needed. 2/17 scheduled bowel meds.   5. Bladder: ? Neurogenic bladder - h/o BPH on flomax prior to admission; was not resumed after admission to the hospital likely due to low BP. Will check PVRs.   6. DVT Prophylaxis: mechanical; per neurosurgery team notes Ok to  start chemical prophylaxis on 2/16 - will clarify. Discussed and started lovenox 40 daily on 2/17.  7. GI Prophylaxis: none, oral intake.   8. Code: full.   9. Disposition: home vs TCU pending progress and family support.   10. ELOS:  3-4 weeks.   11. Follow up Appointments on Discharge: ID, neurosurgery, PCP and PM&R.   Follow-up with neurosurgery, Dr. Marcelo Trent MD in 2 weeks for wound check.       Follow-up with Infectious Disease, Dr. Gillian Vila, on 3/23/17. If you have not heard back regarding your appointment time please call [phone] and ask to page the Infectious Disease fellow regarding your pre-planned appointment with Dr. Vila on 3/23/17 to assess antibiotic regimen and progress     Hyun Koehler MD  Physical Medicine & Rehabilitation     I spent a total of 30 minutes face-to-face or managing the care of Tad Manning. Over 50% of my time on the unit was spent counseling the patient and coordinating care. See note for details.

## 2017-02-18 NOTE — PLAN OF CARE
Problem: Goal/Outcome  Goal: Goal Outcome Summary  Outcome: No Change  FOCUS/GOAL  Bladder management, Pain management, Wound care management and Skin integrity     ASSESSMENT, INTERVENTIONS AND CONTINUING PLAN FOR GOAL:  Pt a&ox4. Ao2 to reposition in bed Complained of backpain x2 received Oxycodone 10mg. Teds placed on pt tonight. Miralax and Senna started tonight, no bm this shift. Noted pinkness in groin-applied powder. Breakfast ordered for pt tomorrow. PVR: 158, continue collecting PVRs.

## 2017-02-18 NOTE — PLAN OF CARE
Problem: Goal/Outcome  Goal: Goal Outcome Summary  Outcome: No Change  FOCUS/GOAL  Medical management     ASSESSMENT, INTERVENTIONS AND CONTINUING PLAN FOR GOAL:  Patient slept on and off throughout the night. AxO. Patient denied any SOB, chest pain, numbness or tingling. PICC line was infusing antibiotics overnight. Assist of 2 when repositioning overnight. Incision to the back is CDI and ALISHA. Voiding without difficulty in the bedside urinal. No BM this shift. Was not out of bed this shift. Patient was able to use call light to make needs known and call light remained within reach for the duration of the shift. NO signs of pain or discomfort upon frequent rounding. Continue POC.

## 2017-02-18 NOTE — PLAN OF CARE
Problem: Goal/Outcome  Goal: Goal Outcome Summary  Pt seen for initial communication evaluation and informal cognitive assessment per MD and OT recommendation (see OT Not 2/16/17).       Pt presents with mild to moderate cognitive linguistic defiicts in the areas of expressive/receptive language, attention, memory, reasoning, and problem solving affecting pt's ability to return to independent living and work.  Pt would benefit from skilled intervention in the areas of word-finding, writing, listening/reading comprehension, alternating and flexible attention, recent verbal memory, deductive/numerical reasoning, and moderate-complex problem solving.  Additional formal cognitive testing is indicated.       Pt has been educated as to the role of the SLP and POC.  Pt expressed verbal agreement to the plan.

## 2017-02-18 NOTE — PROGRESS NOTES
Feels well , feels eating more since feeling better  No cp or sob   No fever ro hcills  No nausea or vomiting  Bg bit up  On Exam ;       Alert and oriented . No acute distress  Vital signs:  Temp: 98.3  F (36.8  C) Temp src: Oral BP: 149/63 Pulse: 88   Resp: 16 SpO2: 98 % O2 Device: None (Room air)   Height: 182.9 cm (6') Weight: 90.3 kg (199 lb 1.6 oz)  Estimated body mass index is 27 kg/(m^2) as calculated from the following:    Height as of this encounter: 1.829 m (6').    Weight as of this encounter: 90.3 kg (199 lb 1.6 oz).     Neck ; supple , no JVD. PICC line in place  Chest ; equal BS bilaterally , no rales or rhonchi   CVS; RRR, no murmur /rubs or gallops  GI ; soft abdomen, non tender, BS positive  Ext ; no edema , no cynosis  Psych ; appropriate mood and effect     Labs; none   BG noted  A/P  Tad Manning is a 76-year-old  gentleman transferred from the [facility] after T7-T12 posterior spinal segmental instrumentation and T9-T10 interbody fusion and posterior arthrodesis T70-T12 for a spinal abscess with osteomyelitis and diskitis ON 2/15  1. 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 The patient should have a CBC, CRP, creatinine and LFTs done weekly. Followup with Infectious Disease, Dr. Gillian Max on 03/23/2017.   2. Diabetes mellitus: Continue the Lantus at night along with sliding scale insulin.   Increased lantus to 18 units 2/18  3. Peripheral vascular disease, status post bilateral lower extremity stents: Continue aspirin . Plavix.( to be started 2/20)   4. Chronic lymphocytic leukemia in remission, last chemo was 09/2015: Follow up with Oncology.   5. Coronary artery disease: Continue aspirin 325 mg daily, Lipitor 40 mg daily, atenolol 25 mg daily.   6. Spinal cord compression with lower extremity weakness:  Primary team to address  7.DVT prophylaxis ; lovenox started 2/16  Discussed with PMR 2/17

## 2017-02-18 NOTE — PROGRESS NOTES
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 Medical co-morbidities include post-op pain, anemia, hyponatremia, and h/o bacteremia on IV antibiotic. PMH significant for DM with peripheral neuropathy, HTN, HLD, CAD s/p CABG, systolic and diastolic heart failure, and CLL in remission.\"   Precautions/Limitations fall precautions   General Info Comments Pt seen for initial communication evaluation per MD order.  Initial H&P indicated waiting for SLT involvment until after OT cognitive evaluation.  That evaluation, completed on 2/16, noted \"Cognitive deficits noted and pt scored a 23/30 on the SLUMS indicating mild/moderate deficits. Recommend SLP involvement\"   Speech   Deficits in Speech Respiration None   Deficits in Phonation None   Deficits in Articulation None   Language: Auditory Comprehension (understanding of spoken language)   Yes/No Sentence and Simple Paragraph; [place] Diagnostic Aphasia Exam 3 short (out of 6 total) 5   Paragraph; Discourse Comprehension Test (out of 8 total; less than 7 is below mean) 4   Functional Assessment Scale (Auditory Comprehension) Moderate Impairment   Comments (Auditory Comprehension) Moderate auditory comprehension impairment impacted by attention and memory deficits   Language: Verbal Expression (use of spoken language to express information)   Generative Naming Score; Cognitive Linguistic Quick Test 4   Generative Naming; Cognitive Linguistic Quick Test Result Below mean   Functional " Assessment Scale (Verbal Expression) Mild Impairment   Comments (Verbal Expression) Noted word-finding difficulties across tasks.  Pt can also be tangential at times, initiating personal stories not relating to the current topic of disccusion or without providing the appropriate context    Reading Comprehension (understanding of written language)   Sentences and Paragraphs; [place] Diagnostic Aphasia Exam (out of 10 total) 7   Functional Assessment Scale (Reading Comprehension) Moderate Impairment   Comments (Reading Comprehension) Again, attention deficit (both to details in the stories as well as following the text accurately) may have played a role    Written Expression (use of writing to express information)   Tests were administered at the following levels Basic (rote activities);Moderate (routine daily activities)   Spelling to Dictation; [place] Diagnostic Aphasia Exam (out of 10 total) 6   Functional Assessment Scale (Written Expression) Moderate Impairment   Comments (Written Expression) Mis-spelled 4 words each with repeating or omitting letters (e.g., particular spelled partipucalar)    Cognitive Status Examination   Attention impaired   Behavioral Observations WFL   Orientation x3   Short Term Memory impaired   Long Term Memory impaired   Reasoning impaired   Executive Function Deficits Noted self-correction   Additional cognitive-linguistic evaluation indicated  yes;recommend;[place];RBANS   General Therapy Interventions   Planned Therapy Interventions Cognitive Treatment;Language   Cognitive treatment Internal memory strategy training;External memory strategy training;Progressive attention training   Language Auditory comprehension;Reading comprehension;Verbal expression;Written expression   Clinical Impression, SLP Eval   Criteria for Skilled Therapeutic Interventions Met Yes;Treatment indicated   SLP Diagnosis mild-moderate cognitive linguistic deficits   Influenced by the following  factors/impairments prolonged and complicated medical course of tx   Functional limitations due to impairments ASLs--pt wishes to return to work   Rehab Potential Good, to achieve stated therapy goals   Rehab potential affected by ongoing medical issues   Therapy Frequency Daily   Predicted Duration of Therapy Intervention (days/wks) 2 weeks   Anticipated Discharge Disposition Home with Assist   Risks and Benefits of Treatment have been explained. Yes   Patient, Family & other staff in agreement with plan of care Yes   Clinical Impression Comments Pt presents with mild to moderate cognitive linguistic defiicts in the areas of expressive/receptive language, attention, memory, reasoning, and problem solving affecting pt's ability to return to independent living and work.  Pt would benefit from skilled intervention in the areas of word-finding, writing, listening/reading comprehension, alternating and flexible attention, recent verbal memory, deductive/numerical reasoning, and moderate-complex problem solving.     Total Evaluation Time      Total Evaluation Time (Minutes) 60

## 2017-02-19 LAB
GLUCOSE BLDC GLUCOMTR-MCNC: 161 MG/DL (ref 70–99)
GLUCOSE BLDC GLUCOMTR-MCNC: 176 MG/DL (ref 70–99)
GLUCOSE BLDC GLUCOMTR-MCNC: 190 MG/DL (ref 70–99)
GLUCOSE BLDC GLUCOMTR-MCNC: 202 MG/DL (ref 70–99)
GLUCOSE BLDC GLUCOMTR-MCNC: 234 MG/DL (ref 70–99)

## 2017-02-19 PROCEDURE — 25000132 ZZH RX MED GY IP 250 OP 250 PS 637: Mod: GY | Performed by: PHYSICAL MEDICINE & REHABILITATION

## 2017-02-19 PROCEDURE — 40000133 ZZH STATISTIC OT WARD VISIT: Performed by: OCCUPATIONAL THERAPIST

## 2017-02-19 PROCEDURE — 25000125 ZZHC RX 250: Performed by: PHYSICAL MEDICINE & REHABILITATION

## 2017-02-19 PROCEDURE — 25000128 H RX IP 250 OP 636: Performed by: PHYSICAL MEDICINE & REHABILITATION

## 2017-02-19 PROCEDURE — 97530 THERAPEUTIC ACTIVITIES: CPT | Mod: GO | Performed by: OCCUPATIONAL THERAPIST

## 2017-02-19 PROCEDURE — A9270 NON-COVERED ITEM OR SERVICE: HCPCS | Mod: GY | Performed by: INTERNAL MEDICINE

## 2017-02-19 PROCEDURE — 25000132 ZZH RX MED GY IP 250 OP 250 PS 637: Mod: GY | Performed by: INTERNAL MEDICINE

## 2017-02-19 PROCEDURE — 40000225 ZZH STATISTIC SLP WARD VISIT: Performed by: SPEECH-LANGUAGE PATHOLOGIST

## 2017-02-19 PROCEDURE — A9270 NON-COVERED ITEM OR SERVICE: HCPCS | Mod: GY | Performed by: PHYSICAL MEDICINE & REHABILITATION

## 2017-02-19 PROCEDURE — 00000146 ZZHCL STATISTIC GLUCOSE BY METER IP

## 2017-02-19 PROCEDURE — 96125 COGNITIVE TEST BY HC PRO: CPT | Mod: GN | Performed by: SPEECH-LANGUAGE PATHOLOGIST

## 2017-02-19 PROCEDURE — 25000128 H RX IP 250 OP 636: Performed by: INTERNAL MEDICINE

## 2017-02-19 PROCEDURE — 12800006 ZZH R&B REHAB

## 2017-02-19 PROCEDURE — 40000193 ZZH STATISTIC PT WARD VISIT: Performed by: PHYSICAL THERAPIST

## 2017-02-19 PROCEDURE — 97530 THERAPEUTIC ACTIVITIES: CPT | Mod: GP | Performed by: PHYSICAL THERAPIST

## 2017-02-19 PROCEDURE — 99232 SBSQ HOSP IP/OBS MODERATE 35: CPT | Performed by: INTERNAL MEDICINE

## 2017-02-19 PROCEDURE — 97532 ZZHC SP COGNITIVE SKILLS EA 15 MIN: CPT | Mod: GN | Performed by: SPEECH-LANGUAGE PATHOLOGIST

## 2017-02-19 RX ORDER — POLYETHYLENE GLYCOL 3350 17 G/17G
17 POWDER, FOR SOLUTION ORAL DAILY PRN
Status: DISCONTINUED | OUTPATIENT
Start: 2017-02-19 | End: 2017-03-19 | Stop reason: HOSPADM

## 2017-02-19 RX ORDER — AMOXICILLIN 250 MG
1 CAPSULE ORAL 2 TIMES DAILY PRN
Status: DISCONTINUED | OUTPATIENT
Start: 2017-02-19 | End: 2017-03-19 | Stop reason: HOSPADM

## 2017-02-19 RX ORDER — OXYCODONE HYDROCHLORIDE 5 MG/1
5-10 TABLET ORAL EVERY 4 HOURS PRN
Status: DISCONTINUED | OUTPATIENT
Start: 2017-02-19 | End: 2017-02-24

## 2017-02-19 RX ORDER — ACETAMINOPHEN 500 MG
1000 TABLET ORAL 3 TIMES DAILY
Status: DISCONTINUED | OUTPATIENT
Start: 2017-02-19 | End: 2017-03-07

## 2017-02-19 RX ADMIN — VITAMIN D, TAB 1000IU (100/BT) 1000 UNITS: 25 TAB at 07:40

## 2017-02-19 RX ADMIN — CEFTRIAXONE 2 G: 2 INJECTION, POWDER, FOR SOLUTION INTRAMUSCULAR; INTRAVENOUS at 12:32

## 2017-02-19 RX ADMIN — TAMSULOSIN HYDROCHLORIDE 0.8 MG: 0.4 CAPSULE ORAL at 19:38

## 2017-02-19 RX ADMIN — INSULIN ASPART 1 UNITS: 100 INJECTION, SOLUTION INTRAVENOUS; SUBCUTANEOUS at 12:33

## 2017-02-19 RX ADMIN — OXYCODONE HYDROCHLORIDE AND ACETAMINOPHEN 500 MG: 500 TABLET ORAL at 07:40

## 2017-02-19 RX ADMIN — INSULIN ASPART 2 UNITS: 100 INJECTION, SOLUTION INTRAVENOUS; SUBCUTANEOUS at 07:38

## 2017-02-19 RX ADMIN — ATORVASTATIN CALCIUM 40 MG: 40 TABLET, FILM COATED ORAL at 07:40

## 2017-02-19 RX ADMIN — ATENOLOL 25 MG: 25 TABLET ORAL at 07:40

## 2017-02-19 RX ADMIN — ACETAMINOPHEN 1000 MG: 500 TABLET, FILM COATED ORAL at 15:48

## 2017-02-19 RX ADMIN — ACETAMINOPHEN 1000 MG: 500 TABLET, FILM COATED ORAL at 19:38

## 2017-02-19 RX ADMIN — SODIUM CHLORIDE, PRESERVATIVE FREE 5 ML: 5 INJECTION INTRAVENOUS at 19:40

## 2017-02-19 RX ADMIN — TRAMADOL HYDROCHLORIDE 50 MG: 50 TABLET, FILM COATED ORAL at 12:33

## 2017-02-19 RX ADMIN — ENOXAPARIN SODIUM 40 MG: 40 INJECTION SUBCUTANEOUS at 07:40

## 2017-02-19 RX ADMIN — INSULIN ASPART 2 UNITS: 100 INJECTION, SOLUTION INTRAVENOUS; SUBCUTANEOUS at 18:08

## 2017-02-19 RX ADMIN — CEFTRIAXONE 2 G: 2 INJECTION, POWDER, FOR SOLUTION INTRAMUSCULAR; INTRAVENOUS at 00:15

## 2017-02-19 RX ADMIN — Medication: at 21:04

## 2017-02-19 RX ADMIN — Medication: at 09:31

## 2017-02-19 RX ADMIN — OXYCODONE HYDROCHLORIDE 10 MG: 5 TABLET ORAL at 06:42

## 2017-02-19 RX ADMIN — ASPIRIN 325 MG ORAL TABLET 325 MG: 325 PILL ORAL at 07:40

## 2017-02-19 RX ADMIN — INSULIN ASPART 1 UNITS: 100 INJECTION, SOLUTION INTRAVENOUS; SUBCUTANEOUS at 21:04

## 2017-02-19 NOTE — PLAN OF CARE
Problem: Goal/Outcome  Goal: Goal Outcome Summary  Orthostatic with standing frame.  Perhaps try tilt table instead.

## 2017-02-19 NOTE — PROGRESS NOTES
Reports loose stools since last night , prior tot hat had been constipated  No cp or sob   No fever ro hcills  No nausea or vomiting  Bg bit up  On Exam ;       Alert and oriented . No acute distress  Vital signs:  Temp: 98.1  F (36.7  C) Temp src: Oral BP: 153/80 Pulse: 114   Resp: 18 SpO2: 97 % O2 Device: None (Room air)   Height: 182.9 cm (6') Weight: 90.3 kg (199 lb 1.6 oz)  Estimated body mass index is 27 kg/(m^2) as calculated from the following:    Height as of this encounter: 1.829 m (6').    Weight as of this encounter: 90.3 kg (199 lb 1.6 oz).    Neck ; supple , no JVD. PICC line in place  Chest ; equal BS bilaterally , no rales or rhonchi   CVS; RRR, no murmur /rubs or gallops  GI ; soft abdomen, non tender, BS positive  Ext ; no edema , no cynosis  Psych ; appropriate mood and effect      Labs; none   BG noted  A/P  Tad Manning is a 76-year-old  gentleman transferred from the [facility] after T7-T12 posterior spinal segmental instrumentation and T9-T10 interbody fusion and posterior arthrodesis T70-T12 for a spinal abscess with osteomyelitis and diskitis ON 2/15  1. 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 The patient should have a CBC, CRP, creatinine and LFTs done weekly. Followup with Infectious Disease, Dr. Gillian Max on 03/23/2017.   2. Diabetes mellitus: Continue the Lantus at night along with sliding scale insulin.   Increased lantus to 20 units 2/19  3. Peripheral vascular disease, status post bilateral lower extremity stents: Continue aspirin . Plavix.( to be started 2/20)   4. Chronic lymphocytic leukemia in remission, last chemo was 09/2015: Follow up with Oncology.   5. Coronary artery disease: Continue aspirin 325 mg daily, Lipitor 40 mg daily, atenolol 25 mg daily.   6. Spinal cord compression with  lower extremity weakness: Primary team to address  7.DVT prophylaxis ; lovenox started 2/16  8.Loose stools ; change scheduled senna and miralax to prn 2/19

## 2017-02-19 NOTE — PLAN OF CARE
Problem: Goal/Outcome  Goal: Goal Outcome Summary  Outcome: No Change  Pt.slept off and on. Watched TV late and likes to keep TV and lights on through the NOC. Continent using urinal indep.- staff empties. PVR x1 = 241. Incont.of med.soft, brown BM x2. Has fungal rash natalie.groin - Micatin pwdr. Buttocks excoriated with scattered small o/a's - fissures. Cleansed and applied critic-aid. Thoracic spine incision intact with sutures and ALISHA - reddened along inc.line and was cleansed with MicroKlenz. Pt.refused offer pain meds. Assist of 1-2 with bed mobility. Has DL open-ended power picc RUE-continues IV abx. TEDs on BLEs.    0650 - Requested and rec'd Oxycodone 10mg po for c/o back pain. Pt.incont.BM again this AM - wants bowel meds held today.

## 2017-02-19 NOTE — PROGRESS NOTES
02/19/17 1545   Signing Clinician's Name / Credentials   Signing clinician's name / credentials Lydia pak Ms/CCC-SLP   Quick Adds   Rehab Discipline SLP   Additional Documentation   SLP Plan SLP; complete Wj-R visual auditory learning as tx and can also do analysis synthesis. [id number] with goals for complex language, attn, memory and reasoning.    Total Session Time   Total Session Time (minutes) 30 minutes   ARC or TCU Only   What unit is patient on? Acute Rehab   SLP - Acute Rehab Center Time   Individual Time (minutes) - enter zero if not applicable - SLP 30  (30 cogntive skills)   Group Time (minutes) - enter zero if not applicable  - SLP 0   Concurrent Time (minutes) - enter zero if not applicable  - SLP 0   Co-Treatment Time (minutes) - enter zero if not applicable  - SLP 0   ARC Total Session Time (minutes) - SLP 30   Comprehension (FIM)   Functional Performance Complete independence comprehending complex/abstract ideas   Comprehension Comment Completed a complex level reading comprehension task- paragraph level- pt scoring 10/10   FIM Score 7- Complete independence   Expression (FIM)   Functional Performance Mild difficulty expressing complex/abstract ideas   Expression Comment mild word retreival diffculty   FIM Score 6- Modified independence   Social Interaction (FIM)   Functional Performance Complete independence-socially appropriate in all situations   FIM Score 7- Complete independence   Problem Solving (FIM)   Functional Performance Needs increased time to make decisions and solve complex problems   Problem Solving Comment Completed WJ- R Verbal analogies test- pt scoirng raw score of 17 and a percentile rank of 68- this is in the high average range of performance. Pt was noted to need increased time to discern the relationships between words   FIM Score 6- Modified independence   Memory (FIM)   Functional Performance Minimal prompting-recognizes and remembers 75-90% of the time   Memory  Comment Completed a visual recall task- recall of a pictured scene- pt scoring 5/7   FIM Score 4- Minimal contact assistance: patient expends 75% or more of effort

## 2017-02-19 NOTE — PLAN OF CARE
Problem: Goal/Outcome  Goal: Goal Outcome Summary  Outcome: No Change  FOCUS/GOAL  Bowel management, Bladder management, Pain management and Mobility     ASSESSMENT, INTERVENTIONS AND CONTINUING PLAN FOR GOAL:  Patient is A&O4, but forgetful, able to make needs known, using call light appropriately. Patient was up with liko lift assist of 2, assist of 2 for bed mobility. Manages clothing and napoleon cares with assist of 2. Bowel sounds present, last BM 02/19, patient has had 2 small incontinent stools, bowel meds held this am. Open area noted to right buttock, LDA added,  aware. Voiding spontaneously in the urinal, PVR's 156 and 205, continue to monitor retention. C/O dizziness when sitting up in w/c, see orthostatics while standing with PT.No c/o chest pain or SOB. Dressing to back CDI, incision pink/red, continue to monitor, VSS. Tolerating regular diet, no Nausea. Continue with bed alarms for safety. Continue POC.

## 2017-02-19 NOTE — PLAN OF CARE
Problem: Goal/Outcome  Goal: Goal Outcome Summary  Repeatable Battery for the Assessment of Neuropsychological Status Update   (RBANS  Update)  The Repeatable Battery for the Assessment of Neuropsychological Status Update (RBANS  Update) was administered to Tad Manning on 2/19/2017.  The RBANS Update was originally developed with a primary focus on assessment of dementia, and measures cognitive decline or improvement across these domains: immediate memory, visuospatial/constructional; language; attention; and delayed memory.  However, special group studies are available for Alzheimer's Disease, Vascular Dementia, HIV Dementia, Aurora's Disease, Parkinson's Disease, Depression, Schizophrenia, and Closed Head Injury.   The RBANS can be used by clinicians and neuropsychologists to help screen for deficits in acute-care settings; track recovery during rehabilitation; track progression of neurological disorders; and screen for neurocognitive status in adolescents.  This test is normed for ages 12-89.  The subtest normative data are presented in scaled score units that have a mean of 10 and a standard deviation of 3.                                                       Total Score     Scaled Score              Percentile Group                                                              I.  Immediate memory               1.  List Learning                                  22                    7                                  7%  2.  Story Memory                                11                    5  Immediate memory Index Score  = 78     II.  Visuospatial/constructional                          3.  Figure copy                                    16                    7  4.  Line Orientation                             19                                                        50%  Visuospatial/constructional Index Score  = 100     III.  Language                            5.  Picture Naming                               10                                                        7%  6.  Semantic Fluency                          7                      2  Language Index Score = 78     IV.  Attention  7.  Digit Span                                                  8                      7                      8%  8.  Coding                                                       26                    6  Attention Index Score = 79     V.  Delayed memory  9.  List recall                                       3                                                          37%  10. List Recognition                            20                                                          11. Story Recall                                  6                      7  12. Figure Recall                                10                    8  Sum of total Scores for Subtest 9+11+12      19     Sum of Index Scores = 430  Total Scale Score =81        INTERPRETATION OF TEST RESULTS: Memory BNL for pt's age.  Intervention warranted.       TIME ADMINISTERING TEST: 45 min  TIME FOR INTERPRETATION AND PREPARATION OF REPORT: 60 min  TOTAL TIME: 105 min     Repeatable Battery for the Assessment of Neuropsychological Status Update (RBANS Update). Copyright   2012 [facility] [employer] Adapted and reproduced with permission. All rights reserved.

## 2017-02-19 NOTE — PLAN OF CARE
Problem: Goal/Outcome  Goal: Goal Outcome Summary  Outcome: No Change  FOCUS/GOAL  Bowel management, Bladder management, Pain management, Wound care management and Mobility     ASSESSMENT, INTERVENTIONS AND CONTINUING PLAN FOR GOAL:  Patient is A&O but forgetful, able to make needs known, using call light appropriately. Patient was up with an assist of 2 and liko lift. Manages clothing and napoleon cares with assist of 2. Bowel sounds present, last BM 02/18, patient has had 3 incontinent episodes in brief, voiding spontaneously in the urinal, PVR's continue to be elevated, patient is voiding frequently, Dr. Leroy updated, may need a U/A, continue to monitor PVR's as they have been slightly over 200, encourage double voiding. No complaint of dizziness, chest pain or SOB. Dressing changed to left lower back CDI, wound cleansed with wound cleanser, incision pink/red, continue to monitor. Tolerating regular diet, no Nausea. Continue with bed alarms for safety. Continue POC.

## 2017-02-19 NOTE — PLAN OF CARE
Problem: Goal/Outcome  Goal: Goal Outcome Summary  OT: Attempted to see at scheduled time. Pt. Reports currently on bed. OT checked back with patient and patient reports still on bed pan unable to see at this time.     Attempted to see patient at later time due to missed AM session. Pt. Reports working on voiding and using urinal as nursing wants to scan bladder, again checked back with patient 15 minutes later and reports still waiting for nursing and still has urinal in place. This session cancelled.   OT did provide education to patient on benefits of participation in therapies which patient verablized  Understanding.

## 2017-02-20 LAB
ANION GAP SERPL CALCULATED.3IONS-SCNC: 10 MMOL/L (ref 3–14)
BUN SERPL-MCNC: 11 MG/DL (ref 7–30)
CALCIUM SERPL-MCNC: 7.8 MG/DL (ref 8.5–10.1)
CHLORIDE SERPL-SCNC: 102 MMOL/L (ref 94–109)
CO2 SERPL-SCNC: 25 MMOL/L (ref 20–32)
CREAT SERPL-MCNC: 0.6 MG/DL (ref 0.66–1.25)
CRP SERPL-MCNC: 127 MG/L (ref 0–8)
ERYTHROCYTE [DISTWIDTH] IN BLOOD BY AUTOMATED COUNT: 16.1 % (ref 10–15)
GFR SERPL CREATININE-BSD FRML MDRD: ABNORMAL ML/MIN/1.7M2
GLUCOSE BLDC GLUCOMTR-MCNC: 168 MG/DL (ref 70–99)
GLUCOSE BLDC GLUCOMTR-MCNC: 177 MG/DL (ref 70–99)
GLUCOSE BLDC GLUCOMTR-MCNC: 186 MG/DL (ref 70–99)
GLUCOSE BLDC GLUCOMTR-MCNC: 224 MG/DL (ref 70–99)
GLUCOSE BLDC GLUCOMTR-MCNC: 309 MG/DL (ref 70–99)
GLUCOSE SERPL-MCNC: 208 MG/DL (ref 70–99)
HCT VFR BLD AUTO: 25 % (ref 40–53)
HGB BLD-MCNC: 7.7 G/DL (ref 13.3–17.7)
MCH RBC QN AUTO: 27.3 PG (ref 26.5–33)
MCHC RBC AUTO-ENTMCNC: 30.8 G/DL (ref 31.5–36.5)
MCV RBC AUTO: 89 FL (ref 78–100)
PLATELET # BLD AUTO: 247 10E9/L (ref 150–450)
PLATELET # BLD AUTO: 273 10E9/L (ref 150–450)
POTASSIUM SERPL-SCNC: 3.4 MMOL/L (ref 3.4–5.3)
RBC # BLD AUTO: 2.82 10E12/L (ref 4.4–5.9)
SODIUM SERPL-SCNC: 137 MMOL/L (ref 133–144)
WBC # BLD AUTO: 7.8 10E9/L (ref 4–11)

## 2017-02-20 PROCEDURE — 12800006 ZZH R&B REHAB

## 2017-02-20 PROCEDURE — 25000132 ZZH RX MED GY IP 250 OP 250 PS 637: Mod: GY | Performed by: PHYSICAL MEDICINE & REHABILITATION

## 2017-02-20 PROCEDURE — 25000132 ZZH RX MED GY IP 250 OP 250 PS 637: Mod: GY | Performed by: INTERNAL MEDICINE

## 2017-02-20 PROCEDURE — 00000146 ZZHCL STATISTIC GLUCOSE BY METER IP

## 2017-02-20 PROCEDURE — 36592 COLLECT BLOOD FROM PICC: CPT | Performed by: INTERNAL MEDICINE

## 2017-02-20 PROCEDURE — 40000225 ZZH STATISTIC SLP WARD VISIT

## 2017-02-20 PROCEDURE — A9270 NON-COVERED ITEM OR SERVICE: HCPCS | Mod: GY | Performed by: PHYSICAL MEDICINE & REHABILITATION

## 2017-02-20 PROCEDURE — A9270 NON-COVERED ITEM OR SERVICE: HCPCS | Mod: GY | Performed by: INTERNAL MEDICINE

## 2017-02-20 PROCEDURE — 86140 C-REACTIVE PROTEIN: CPT | Performed by: INTERNAL MEDICINE

## 2017-02-20 PROCEDURE — 97530 THERAPEUTIC ACTIVITIES: CPT | Mod: GP

## 2017-02-20 PROCEDURE — 97535 SELF CARE MNGMENT TRAINING: CPT | Mod: GO

## 2017-02-20 PROCEDURE — 25000128 H RX IP 250 OP 636: Performed by: INTERNAL MEDICINE

## 2017-02-20 PROCEDURE — 85049 AUTOMATED PLATELET COUNT: CPT | Performed by: PHYSICAL MEDICINE & REHABILITATION

## 2017-02-20 PROCEDURE — 40000133 ZZH STATISTIC OT WARD VISIT

## 2017-02-20 PROCEDURE — 40000193 ZZH STATISTIC PT WARD VISIT

## 2017-02-20 PROCEDURE — 85027 COMPLETE CBC AUTOMATED: CPT | Performed by: INTERNAL MEDICINE

## 2017-02-20 PROCEDURE — 80048 BASIC METABOLIC PNL TOTAL CA: CPT | Performed by: INTERNAL MEDICINE

## 2017-02-20 PROCEDURE — 97532 ZZHC SP COGNITIVE SKILLS EA 15 MIN: CPT | Mod: GN

## 2017-02-20 PROCEDURE — 36592 COLLECT BLOOD FROM PICC: CPT | Performed by: PHYSICAL MEDICINE & REHABILITATION

## 2017-02-20 PROCEDURE — 99233 SBSQ HOSP IP/OBS HIGH 50: CPT | Performed by: INTERNAL MEDICINE

## 2017-02-20 PROCEDURE — 97110 THERAPEUTIC EXERCISES: CPT | Mod: GP

## 2017-02-20 PROCEDURE — 25000128 H RX IP 250 OP 636: Performed by: PHYSICAL MEDICINE & REHABILITATION

## 2017-02-20 PROCEDURE — 25000125 ZZHC RX 250: Performed by: PHYSICAL MEDICINE & REHABILITATION

## 2017-02-20 RX ORDER — CLOPIDOGREL BISULFATE 75 MG/1
75 TABLET ORAL DAILY
Status: DISCONTINUED | OUTPATIENT
Start: 2017-02-20 | End: 2017-03-19 | Stop reason: HOSPADM

## 2017-02-20 RX ADMIN — Medication: at 20:54

## 2017-02-20 RX ADMIN — VITAMIN D, TAB 1000IU (100/BT) 1000 UNITS: 25 TAB at 07:56

## 2017-02-20 RX ADMIN — SODIUM CHLORIDE, PRESERVATIVE FREE 5 ML: 5 INJECTION INTRAVENOUS at 07:47

## 2017-02-20 RX ADMIN — OXYCODONE HYDROCHLORIDE AND ACETAMINOPHEN 500 MG: 500 TABLET ORAL at 07:56

## 2017-02-20 RX ADMIN — TAMSULOSIN HYDROCHLORIDE 0.8 MG: 0.4 CAPSULE ORAL at 20:52

## 2017-02-20 RX ADMIN — SODIUM CHLORIDE, PRESERVATIVE FREE 5 ML: 5 INJECTION INTRAVENOUS at 16:00

## 2017-02-20 RX ADMIN — ATENOLOL 25 MG: 25 TABLET ORAL at 07:56

## 2017-02-20 RX ADMIN — Medication: at 08:02

## 2017-02-20 RX ADMIN — CEFTRIAXONE 2 G: 2 INJECTION, POWDER, FOR SOLUTION INTRAMUSCULAR; INTRAVENOUS at 12:20

## 2017-02-20 RX ADMIN — CEFTRIAXONE 2 G: 2 INJECTION, POWDER, FOR SOLUTION INTRAMUSCULAR; INTRAVENOUS at 00:00

## 2017-02-20 RX ADMIN — OXYCODONE HYDROCHLORIDE 10 MG: 5 TABLET ORAL at 06:35

## 2017-02-20 RX ADMIN — INSULIN ASPART 1 UNITS: 100 INJECTION, SOLUTION INTRAVENOUS; SUBCUTANEOUS at 21:51

## 2017-02-20 RX ADMIN — ACETAMINOPHEN 1000 MG: 500 TABLET, FILM COATED ORAL at 07:56

## 2017-02-20 RX ADMIN — ATORVASTATIN CALCIUM 40 MG: 40 TABLET, FILM COATED ORAL at 07:56

## 2017-02-20 RX ADMIN — TRAMADOL HYDROCHLORIDE 50 MG: 50 TABLET, FILM COATED ORAL at 20:53

## 2017-02-20 RX ADMIN — ACETAMINOPHEN 1000 MG: 500 TABLET, FILM COATED ORAL at 20:52

## 2017-02-20 RX ADMIN — SODIUM CHLORIDE, PRESERVATIVE FREE 5 ML: 5 INJECTION INTRAVENOUS at 20:58

## 2017-02-20 RX ADMIN — ASPIRIN 325 MG ORAL TABLET 325 MG: 325 PILL ORAL at 07:56

## 2017-02-20 RX ADMIN — METFORMIN HYDROCHLORIDE 500 MG: 500 TABLET ORAL at 18:29

## 2017-02-20 RX ADMIN — INSULIN ASPART 1 UNITS: 100 INJECTION, SOLUTION INTRAVENOUS; SUBCUTANEOUS at 07:56

## 2017-02-20 RX ADMIN — INSULIN ASPART 1 UNITS: 100 INJECTION, SOLUTION INTRAVENOUS; SUBCUTANEOUS at 18:30

## 2017-02-20 RX ADMIN — CLOPIDOGREL BISULFATE 75 MG: 75 TABLET, FILM COATED ORAL at 15:04

## 2017-02-20 RX ADMIN — ENOXAPARIN SODIUM 40 MG: 40 INJECTION SUBCUTANEOUS at 07:56

## 2017-02-20 RX ADMIN — ACETAMINOPHEN 1000 MG: 500 TABLET, FILM COATED ORAL at 13:39

## 2017-02-20 RX ADMIN — INSULIN ASPART 4 UNITS: 100 INJECTION, SOLUTION INTRAVENOUS; SUBCUTANEOUS at 12:20

## 2017-02-20 NOTE — PLAN OF CARE
Problem: Goal/Outcome  Goal: Goal Outcome Summary  Outcome: No Change  Patient slept on and off throughout the night. AxO. Patient denied any pain, SOB, chest pain, or numbness. Reports some baseline tingling in fingers. PICC line was infusing antibiotics overnight. Assist of 2 when repositioning overnight. Incision to the back is CDI and ALISHA with some reddness around it. Bottom remains very red and was cared for per plan of care. Patient still retaining urine and was straight cath'ed at 0100 for 400cc due to PVR of 316. Incontinent of BM this shift x2. Able to make needs known and call light remained within reach for the duration of the shift. NO signs of pain or discomfort upon frequent rounding. Continue POC.

## 2017-02-20 NOTE — PLAN OF CARE
Problem: Goal/Outcome  Goal: Goal Outcome Summary  Outcome: No Change  Pt is A&O. VSS and WNL. C/o some back pain which was well controlled with scheduled tylenol. Back incision is CDI with some erythema and ALISHA. No drainage, swelling, or warmth. Afebrile. Primapore CDI left side of back. Bottom continues to be red; no sting applied per plan of care. Denies chest pain or SOB. Reports baseline tingling to BUE fingers but denies changes. BS present and active x 4. No n/v. BGs= 190 and 231. Had multiple soft, BMs today in bedpan and was incontinent x 1. Voiding in bedside urinal. PVRs <250 mL. PICC patent and hep locked. Repositioned q 2 hrs throughout evening with assist x 2. Up with lift and wheelchair. Has call light in reach and is able to make needs known. Bed alarm on. Continue with plan of care.

## 2017-02-20 NOTE — PROGRESS NOTES
PM&R Daily Note:    Urine retention: did need to be catheterized early AM with 400 mL but subsequent some lower PVR 48. Will continue present scan and catheterize plan. Trying to minimize opioids as detailed in my note 2/19.  Tramadol 25-50 Q 6 prn (dose could go up) and use first line with oxycodone only second line. Schedule acetaminophen 1000 TID.    Some orthostasis in standing frame, will pursue tilt table for more gradual transitions and building tolerance. Use LE compression stockings.    appreciate hospitalist consult on medical issues.   DM: starting metfromin. Continue insulin glargine 20 U AM. Continue aspart SS    Ceftriaxone IV continues with osteomyelitis, diskitis.outline through 3/24.    Stools on looser side yesterday, improved with change meds to prn.    Enoxaparin changing to clopidogrel today.      Vitals:    02/19/17 1502 02/20/17 0615 02/20/17 0735 02/20/17 1535   BP: 144/66 (!) 123/97 139/74 120/59   BP Location: Left arm Left arm  Left arm   Pulse: 89 94 87 70   Resp: 18 12  16   Temp: 97.6  F (36.4  C) 98.8  F (37.1  C)  97.6  F (36.4  C)   TempSrc: Oral Oral  Oral   SpO2: 96% 95%  100%   Weight:       Height:         Alert, no diaphoresis  Two family present  Some improving movement in legs  Bright affect

## 2017-02-20 NOTE — PROGRESS NOTES
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   The patient should have a CBC, CRP, creatinine and LFTs done weekly.   Followup with Infectious Disease, Dr. Gillian Max on 03/23/2017.  No fevers        Diabetes mellitus: Continue the Lantus at night along with sliding scale insulin.   Increased lantus to 20 units 2/19  Was on metformin at home   Start metformin 500 mg BID on 2/20  Monitor blood sugars -will titrate Lantus dose     peripheral vascular disease, status post bilateral lower extremity stents:   Continue aspirin .    Plavix(  started 2/20)     # Chronic lymphocytic leukemia in remission, last chemo was 09/2015: Follow up with Oncology.   # Coronary artery disease: Continue aspirin 325 mg daily, Lipitor 40 mg daily, atenolol 25 mg daily.   # Spinal cord compression with lower extremity weakness: plan per  Primary team   # DVT prophylaxis ; lovenox started 2/16 , dc after today's dose  (as plavix started today )   # Loose stools ; resolving - changed scheduled senna and miralax to prn 2/19      Subjective:  No new concerns   No fever or chills   No short ness of breath     Ros:  4-point ROS negative except in subjective/interval history.     Objective         Physical Exam:  /74  Pulse (P) 87  Temp 98.8  F (37.1  C) (Oral)  Resp 12  Ht 1.829 m (6')  Wt 90.3 kg (199 lb 1.6 oz)  SpO2 95%  BMI 27 kg/m2  General: No distress, cooperative, pleasant  HEENT: NC/AT  PERRL.  EOMI  Pulmonary: CTAB, normal respiratory effort. No wheezes, rales or rhonchi   Cardiovascular: RRR. S1 and S2 preset. No m/g/r.  Abdomen:BS+, soft, non-tender, non-distended. No guarding or rebound tenderness.   Extremities: Warm and well perfused. No lower extremity edema.  Neuro: Alert and oriented x 3.      LABS (Last four results)  CBC  Recent Labs  Lab 02/20/17  0754 02/17/17  0535   WBC 7.8  --    RBC 2.82*  --    HGB 7.7*  --    HCT 25.0*  --    MCV 89  --    MCH 27.3  --    MCHC 30.8*  --    RDW 16.1*  --      247 189            Juan Berryist [phone]   [facility], [place]     2/20/2017

## 2017-02-20 NOTE — PROGRESS NOTES
PM&R Daily Note:    Some persisting mild high PVR's with voiding typically 100-200 mL and , 237, 218. Will try to minimize opioid yet control pain. Add tramadol 25-50 Q 6 prn (dose could go up) and use first line with oxycodone only second line. Schedule acetaminophen 1000 TID.    Some orthostasis in standing frame, will pursue tilt table for more gradual transitions and building tolerance. Use LE compression stockings.    appreciate hospitalist consult on medical issues.   DM: with trending up BG, insulin has been sequentially increased over last several days. Now glargine 20 U AM. Continue aspart SS    Ceftriaxone IV continues with osteomyelitis, diskitis.outline through 3/24.    Stools on looser side, change meds to prn.    Vitals:    02/19/17 0207 02/19/17 0736 02/19/17 1341 02/19/17 1502   BP: 124/52 153/80 121/90 144/66   BP Location: Left arm Left arm  Left arm   Pulse: 93 114 72 89   Resp:  18  18   Temp:    97.6  F (36.4  C)   TempSrc:    Oral   SpO2:  97%  96%   Weight:       Height:         Alert, no diaphoresis  Heart RRR  Lungs clear  Comfortable up in chair.

## 2017-02-20 NOTE — PLAN OF CARE
Problem: Goal/Outcome  Goal: Goal Outcome Summary  OT: patient fatigued yet still willing to participate in late PM OTtX

## 2017-02-20 NOTE — PLAN OF CARE
Problem: Goal/Outcome  Goal: Goal Outcome Summary  PT: pt participated well in therapy- focus on B LE A/AROM supine and sitting EOB. Pt sat EOB with no physical A for ~5 minutes before reporting back pain/fatigue and needing to lay back down. Pt self propel in w/c with B UE ~240' around tight spaces, turns and in and out of doorways.

## 2017-02-20 NOTE — PLAN OF CARE
Problem: Goal/Outcome  Goal: Goal Outcome Summary  Outcome: No Change  Pt presents as a&ox4, denies chest pain, SOB, or numbness, but does Reports some baseline tingling in fingers. PICC line is patent, given abx. Assist of 2 when repositioning overnight. Incision to the back is CDI and ALISHA with some reddness around it. Bottom remains very red and was cared for per plan of care. Patient reports not urinating until around 1200, only went about 100 CC, PVR was 112.  No BM on this shift but was inontinent last night. Able to make needs known and call light remained within reach for the duration of the shift. BG = 177 and 309. NO signs of pain or discomfort upon frequent rounding. Continue POC.

## 2017-02-20 NOTE — PROGRESS NOTES
02/20/17 1254   Signing Clinician's Name / Credentials   Signing clinician's name / credentials Michelle Hernandez, Speech Therapy Student   Quick Adds   Rehab Discipline SLP   Additional Documentation   SLP Plan SLP: WJ-R Analysis-synthesis, higher-level/complex memory tasks   Total Session Time   Total Session Time (minutes) 30 minutes  (Cog tx)   SLP - Acute Rehab Center Time   Individual Time (minutes) - enter zero if not applicable - SLP 30   Group Time (minutes) - enter zero if not applicable  - SLP 0   Concurrent Time (minutes) - enter zero if not applicable  - SLP 0   Co-Treatment Time (minutes) - enter zero if not applicable  - SLP 0   ARC Total Session Time (minutes) - SLP 30   Comprehension (FIM)   Comprehension Comment SLP: Pt with [facility] conversational comprehension.   Expression (FIM)   Expression Comment SLP: Completed Visual-Auditory Learning subtests of WJ-R. Pt scored standard score of 121, placing him in the 92nd percentile.   Social Interaction (FIM)   Social Interaction Comment SLP: Pt pleasant and cooperative.    Memory (FIM)   Memory Comment SLP: Pt expressed concern re: short-term and long-term memory, especially for events that happened within ~3 days. No concerns reported for long-term retrieval or immediate retrieval.     SLP: Pt recalled tasks from AM session accurately. Completed functional memory task involving paragraph-length of material and comprehension questions. Pt with 14/15 accuracy with immediate (within ~1 minute) recall, and only required min cues to recall gists of each paragraph after activity completed. Pt's immediate short-term memory functionally intact. Pt expressed concern regarding memory for events within ~3 days, and shared that his father had dementia. Considering this family history, counseling regarding signs of dementia and compensatory strategies for memory deficits appropriate.

## 2017-02-21 LAB
ANION GAP SERPL CALCULATED.3IONS-SCNC: 9 MMOL/L (ref 3–14)
BUN SERPL-MCNC: 11 MG/DL (ref 7–30)
CA-I BLD-MCNC: 4.5 MG/DL (ref 4.4–5.2)
CALCIUM SERPL-MCNC: 8.4 MG/DL (ref 8.5–10.1)
CHLORIDE SERPL-SCNC: 102 MMOL/L (ref 94–109)
CO2 SERPL-SCNC: 26 MMOL/L (ref 20–32)
CREAT SERPL-MCNC: 0.56 MG/DL (ref 0.66–1.25)
GFR SERPL CREATININE-BSD FRML MDRD: ABNORMAL ML/MIN/1.7M2
GLUCOSE BLDC GLUCOMTR-MCNC: 162 MG/DL (ref 70–99)
GLUCOSE BLDC GLUCOMTR-MCNC: 168 MG/DL (ref 70–99)
GLUCOSE BLDC GLUCOMTR-MCNC: 173 MG/DL (ref 70–99)
GLUCOSE BLDC GLUCOMTR-MCNC: 182 MG/DL (ref 70–99)
GLUCOSE BLDC GLUCOMTR-MCNC: 224 MG/DL (ref 70–99)
GLUCOSE SERPL-MCNC: 206 MG/DL (ref 70–99)
MAGNESIUM SERPL-MCNC: 1.8 MG/DL (ref 1.6–2.3)
POTASSIUM SERPL-SCNC: 3.6 MMOL/L (ref 3.4–5.3)
SODIUM SERPL-SCNC: 137 MMOL/L (ref 133–144)

## 2017-02-21 PROCEDURE — 97530 THERAPEUTIC ACTIVITIES: CPT | Mod: GP | Performed by: PHYSICAL THERAPIST

## 2017-02-21 PROCEDURE — 40000193 ZZH STATISTIC PT WARD VISIT: Performed by: PHYSICAL THERAPIST

## 2017-02-21 PROCEDURE — 82330 ASSAY OF CALCIUM: CPT | Performed by: INTERNAL MEDICINE

## 2017-02-21 PROCEDURE — 25000132 ZZH RX MED GY IP 250 OP 250 PS 637: Mod: GY | Performed by: PHYSICAL MEDICINE & REHABILITATION

## 2017-02-21 PROCEDURE — 97530 THERAPEUTIC ACTIVITIES: CPT | Mod: GO

## 2017-02-21 PROCEDURE — 25000128 H RX IP 250 OP 636: Performed by: INTERNAL MEDICINE

## 2017-02-21 PROCEDURE — 97535 SELF CARE MNGMENT TRAINING: CPT | Mod: GO

## 2017-02-21 PROCEDURE — 97532 ZZHC SP COGNITIVE SKILLS EA 15 MIN: CPT | Mod: GN

## 2017-02-21 PROCEDURE — 93005 ELECTROCARDIOGRAM TRACING: CPT

## 2017-02-21 PROCEDURE — 40000133 ZZH STATISTIC OT WARD VISIT

## 2017-02-21 PROCEDURE — 99233 SBSQ HOSP IP/OBS HIGH 50: CPT | Performed by: INTERNAL MEDICINE

## 2017-02-21 PROCEDURE — 40000225 ZZH STATISTIC SLP WARD VISIT

## 2017-02-21 PROCEDURE — 25000132 ZZH RX MED GY IP 250 OP 250 PS 637: Mod: GY | Performed by: INTERNAL MEDICINE

## 2017-02-21 PROCEDURE — A9270 NON-COVERED ITEM OR SERVICE: HCPCS | Mod: GY | Performed by: PHYSICAL MEDICINE & REHABILITATION

## 2017-02-21 PROCEDURE — 25000125 ZZHC RX 250: Performed by: PHYSICAL MEDICINE & REHABILITATION

## 2017-02-21 PROCEDURE — A9270 NON-COVERED ITEM OR SERVICE: HCPCS | Mod: GY | Performed by: INTERNAL MEDICINE

## 2017-02-21 PROCEDURE — 00000146 ZZHCL STATISTIC GLUCOSE BY METER IP

## 2017-02-21 PROCEDURE — 80048 BASIC METABOLIC PNL TOTAL CA: CPT | Performed by: INTERNAL MEDICINE

## 2017-02-21 PROCEDURE — 25000128 H RX IP 250 OP 636: Performed by: PHYSICAL MEDICINE & REHABILITATION

## 2017-02-21 PROCEDURE — 36592 COLLECT BLOOD FROM PICC: CPT | Performed by: INTERNAL MEDICINE

## 2017-02-21 PROCEDURE — 12800006 ZZH R&B REHAB

## 2017-02-21 PROCEDURE — 83735 ASSAY OF MAGNESIUM: CPT | Performed by: INTERNAL MEDICINE

## 2017-02-21 RX ADMIN — SODIUM CHLORIDE, PRESERVATIVE FREE 5 ML: 5 INJECTION INTRAVENOUS at 19:52

## 2017-02-21 RX ADMIN — CEFTRIAXONE 2 G: 2 INJECTION, POWDER, FOR SOLUTION INTRAMUSCULAR; INTRAVENOUS at 00:41

## 2017-02-21 RX ADMIN — Medication: at 07:40

## 2017-02-21 RX ADMIN — SODIUM CHLORIDE, PRESERVATIVE FREE 5 ML: 5 INJECTION INTRAVENOUS at 01:20

## 2017-02-21 RX ADMIN — METFORMIN HYDROCHLORIDE 500 MG: 500 TABLET ORAL at 17:44

## 2017-02-21 RX ADMIN — Medication: at 19:52

## 2017-02-21 RX ADMIN — CLOPIDOGREL BISULFATE 75 MG: 75 TABLET, FILM COATED ORAL at 07:36

## 2017-02-21 RX ADMIN — METFORMIN HYDROCHLORIDE 500 MG: 500 TABLET ORAL at 07:36

## 2017-02-21 RX ADMIN — OXYCODONE HYDROCHLORIDE AND ACETAMINOPHEN 500 MG: 500 TABLET ORAL at 07:36

## 2017-02-21 RX ADMIN — INSULIN ASPART 1 UNITS: 100 INJECTION, SOLUTION INTRAVENOUS; SUBCUTANEOUS at 17:45

## 2017-02-21 RX ADMIN — ATORVASTATIN CALCIUM 40 MG: 40 TABLET, FILM COATED ORAL at 07:36

## 2017-02-21 RX ADMIN — TAMSULOSIN HYDROCHLORIDE 0.8 MG: 0.4 CAPSULE ORAL at 19:51

## 2017-02-21 RX ADMIN — ATENOLOL 25 MG: 25 TABLET ORAL at 07:36

## 2017-02-21 RX ADMIN — ENOXAPARIN SODIUM 40 MG: 40 INJECTION SUBCUTANEOUS at 07:36

## 2017-02-21 RX ADMIN — INSULIN ASPART 1 UNITS: 100 INJECTION, SOLUTION INTRAVENOUS; SUBCUTANEOUS at 07:38

## 2017-02-21 RX ADMIN — INSULIN ASPART 2 UNITS: 100 INJECTION, SOLUTION INTRAVENOUS; SUBCUTANEOUS at 12:30

## 2017-02-21 RX ADMIN — ACETAMINOPHEN 1000 MG: 500 TABLET, FILM COATED ORAL at 19:51

## 2017-02-21 RX ADMIN — ASPIRIN 325 MG ORAL TABLET 325 MG: 325 PILL ORAL at 07:36

## 2017-02-21 RX ADMIN — VITAMIN D, TAB 1000IU (100/BT) 1000 UNITS: 25 TAB at 07:36

## 2017-02-21 RX ADMIN — ACETAMINOPHEN 1000 MG: 500 TABLET, FILM COATED ORAL at 13:46

## 2017-02-21 RX ADMIN — ACETAMINOPHEN 1000 MG: 500 TABLET, FILM COATED ORAL at 07:36

## 2017-02-21 RX ADMIN — CEFTRIAXONE 2 G: 2 INJECTION, POWDER, FOR SOLUTION INTRAMUSCULAR; INTRAVENOUS at 12:43

## 2017-02-21 RX ADMIN — OXYCODONE HYDROCHLORIDE 10 MG: 5 TABLET ORAL at 22:45

## 2017-02-21 NOTE — PLAN OF CARE
Problem: Goal/Outcome  Goal: Goal Outcome Summary  FOCUS/GOAL  Bladder management, Pain management and Skin integrity     ASSESSMENT, INTERVENTIONS AND CONTINUING PLAN FOR GOAL:  Pt here following spinal injury and requires A2 with LIKO lift at this time.  He has degradation to his coccyx and needs repositioning at least every two hours.  K+ and Hgb found to be on the low side, writer obtained a banana for dinner and sticky note left for MD regarding Hgb.  His CRP is slightly down from previous check.  He reported light headedness at beginning of shift with no other s/s.  Writer repositioned and encouraged food and fluids and pt reported resolved within 1 hr.  He still has bladder retention and staff to utilize ultram as first line pain reliever.  Continue to promote nutrituion as he is very weak.

## 2017-02-21 NOTE — PLAN OF CARE
Problem: Goal/Outcome  Goal: Goal Outcome Summary  Up w/ tilt table, TEDs on, abdominal binder not available at start of session but in room now for next time.   BP's:   Time    Angle               BP                   HR  1012    0                      132/75             98  1018    30                    130/78             56  1028    45                    133/78             127                  RN present, assessed HR; diaphoretic so lowered angle  1033    30                    145/63             54                    Remained slightly lightheaded and diaphoretic     Continue w/ tilt table QOD.

## 2017-02-21 NOTE — PLAN OF CARE
Problem: Goal/Outcome  Goal: Goal Outcome Summary  Outcome: Therapy, progress toward functional goals as expected      Pt remembered to complete memory journal of speech session from AM; encouraged use for all therapies to help remember activities and track progress. With moderate-to-complex reading comprehension task, pt answered 5/5 comprehension questions correctly with paragraph as reference and 4/5 multiple choice questions correctly from memory. With visual memory task (house), pt answered 6/10 questions correctly after first vieweing and 10/10 after second viewing (each viewing ~1 minute). Began word puzzle (attention) task, left in pt's room to complete independently.

## 2017-02-21 NOTE — PROGRESS NOTES
"  [facility]   Acute Rehabilitation Unit  Daily progress note    interval history  Tad Manning was seen and examined at bedside. Slept ok last night; reports some pain around his incision and in feet. Continues to get dizzy and lightheaded with sitting and standing position. Was also tachycardic this am in tilt table. EKG was obtained for more eval of his irregular rhythm which showed \"possible ectopic atrial rhythm with frequent PVCs\". Reports fair appetite with loss of taste of taste - no clear etiology at this point.     Spoke with Dr. Lange hospitalist, appreciated assistance. Repeat BMP wnl today; plan is to obtain cortisol level in am and continue to monitor him clinically for now.  Follow orthostatic precautions.     Making slow progress with therapies; limited by fatigue, severe deconditioning and possible autonomic instability due to SCI. Pain has been well controlled now on scheduled tylenol. Will discuss in team rounds tomorrow.       medications    metFORMIN  500 mg Oral BID w/meals     clopidogrel  75 mg Oral Daily     insulin glargine  20 Units Subcutaneous QAM AC     acetaminophen  1,000 mg Oral TID     miconazole   Topical BID     enoxaparin  40 mg Subcutaneous Q24H     ascorbic acid (VITAMIN C) tablet 500 mg  500 mg Oral Daily     aspirin  325 mg Oral Daily     atenolol  25 mg Oral Daily     atorvastatin  40 mg Oral Daily     cefTRIAXone  2 g Intravenous Q12H     cholecalciferol  1,000 Units Oral Daily     tamsulosin  0.8 mg Oral At Bedtime     insulin aspart  1-7 Units Subcutaneous TID AC     insulin aspart  1-5 Units Subcutaneous At Bedtime     heparin lock flush  5-10 mL Intracatheter Q24H     tramadol, oxyCODONE, senna-docusate, polyethylene glycol, bisacodyl, - MEDICATION INSTRUCTIONS -, cyclobenzaprine, lidocaine, glucose **OR** dextrose **OR** glucagon, naloxone, sodium chloride (PF), heparin lock flush       physical exam  /76  Pulse 136  " "Temp 97.6  F (36.4  C) (Oral)  Resp 16  Ht 1.829 m (6')  Wt 90.3 kg (199 lb 1.6 oz)  SpO2 97%  BMI 27 kg/m2     Gen: NAD, resting in bed   HEENT: has glasses   Cardio: RRR in am - tachy in afternoon when was seen again   Pulm: clear breath sounds b/l   Abd: soft and non-tender   Ext: trace edema in bilateral lower extremities, no tenderness in calves   Neuro/MSK: alert, diminished sensation in feet, strength improving in bilateral lower extremities - now antigravity in all muscle groups       labs  BMP wnl   BG in higher range     assessment and plan  Tad Manning is a 76 year old right hand dominant male with 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 Medical co-morbidities include post-op pain, anemia, hyponatremia, and h/o bacteremia on IV antibiotic. PMH significant for DM with peripheral neuropathy, HTN, HLD, CAD s/p CABG, systolic and diastolic heart failure, and CLL in remission.      Functional deficits are weakness in bilateral lower extremities, sensory loss in feet (due to diabetic neuropathy but worse likely due to SCI), neurogenic bladder and bowel, and acute on chronic deconditioning with poor endurance.         Admission to acute inpatient rehab 2/15.     Rehabilitation: please see interval history for updates. Ordered SLP on 2/16 given his low score on SLUMS 23/30.    SLP on 2/20: \"Completed Visual-Auditory Learning subtests of [employer]J-[employer]. Pt scored standard score of 121, placing him in the 92nd percentile\".    Medical Conditions; appreciate hospitalist team assistance in medical management.      # Incomplete SCI:  # Thoracic osteomyelitis/discitis with epidural abscess:  # S/p T9-10 laminectomy, T7-12 posterior arthrodesis and TF right sided T9-10 fusion.   # Strep mitis bacteremia:      --TLSO for comfort  --wound care per recs; will remove sutures at 2 weeks higinio ~ 2/26  --PICC line care on the right " arm   --pain management with lidoderm patch, prn tylenol, oxycodone and flexeril upon admission - will adjust as needed.    2/17 dc'd lidoderm patch as he was refusing to use. Consider to add long acting.    2/19 tylenol was scheduled to help with pain and minimize narcotic use. Also tramadol was added to again minimize oxycodone intake due to side effects like urinary retention.    2/21 dc'd flexeril as he was not requiring.   --continue IV rocephin for total of 6 weeks - end date 3/24  --should f/u with ID and neurosurgery teams as outpatient      ** Check CBC, CRP, creatinine, and LFTs weekly while on therapy and have results faxed to the [facility] at [phone]. - faxed on 2/21     Hyponatremia: resolved since 2/12 and stable ~ 131. 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 Will repeat labs on Monday. 2/20 Na 137.       Anemia: likely due to acute illness and blood loss; stable since his surgery. Will monitor clinically and repeat labs as needed.      # DM type II with neuropathy: on glipizide and victoza injections prior to admission. HgbA1C was 6.1% on 2/10. Currently on lantus 12 units daily and SSI. 2/17 lantus was increased to 14 for better control. 2/21 lantus increased to 20 daily and also was started on metformin 500 bid.      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 Continue ASA; plavix restarted on 2/20.        # HTN: on amlodipine, atenolol, lisinopril and prinzide (?) prior to admission. Currently on atenolol 25 daily; BP has been variable - see below.    # Orthostatic hypotension: as confirmed by PT on 2/17. Multifactorial due to anemia, severe deconditioning and possibly autonomic instability due SCI. Should have TEDs and abdominal binder before getting out of bed. Ensure adequate hydration.       # Tachycardia with irregular rhythm: occurred during PT session on 2/21 while on tilt table;  tachycardia is multifactorial as orthostatic hypotension. Spoke with Dr. Lange hospitalist, appreciated assistance. Repeat BMP wnl today; plan is to obtain cortisol level in am and continue to monitor him clinically for now.  Follow orthostatic precautions. Asymptomatic at this point.       # HLD: on lipitor 40 daily. LDL was 48 in 5/2014 when last checked.         # CAD s/p CABG in 1995 with HFrEF (EF 40-45%): no active issues - continue ASA, lipitor and atenolol. Low threshold to repeat echo if any clinical s/s of heart failure.      # H/o CLL in remission: last chemo in 2015; is followed by oncology team - no active issues.      2. Adjustment to disability: seems to be coping well. Clinical psychology to eval and treat as needed.   3. FEN: regular, mod consistent CHO.   4. Bowel: ? Neurogenic bowel - on prn bowel meds upon admission with last BM on 2/14. Will monitor and adjust meds/bowel regimen as needed. 2/17 scheduled bowel meds. 2/20 had loose BMs over the weekend so meds were changed to prn; started on metformin which can be contributing as well.   5. Bladder: ? Neurogenic bladder - h/o BPH on flomax prior to admission; was not resumed after admission to the hospital likely due to low BP but restarted upon admission to ARU. PVRs elevated and was cathed once on 2/18. Plan is to minimize narcotic use, void in standing/sitting position as much as possible, and continue to monitor PVRs.   6. DVT Prophylaxis: mechanical; per neurosurgery team notes Ok to start chemical prophylaxis on 2/16 - will clarify. Discussed and started lovenox 40 daily on 2/17. 2/21 dc'd lovenox per hospitalist team recs as he was started on plavix.   7. GI Prophylaxis: none, oral intake.   8. Code: full.   9. Disposition: home vs TCU pending progress and family support. Will discuss in team rounds.   10. ELOS:  3-4 weeks.   11. Follow up Appointments on Discharge: ID, neurosurgery, PCP and PM&R.   Follow-up with neurosurgery, Dr. Robertson  Tex rTent MD in 2 weeks for wound check.       Follow-up with Infectious Disease, Dr. Gillian Vila, on 3/23/17. If you have not heard back regarding your appointment time please call [phone] and ask to page the Infectious Disease fellow regarding your pre-planned appointment with Dr. Vila on 3/23/17 to assess antibiotic regimen and progress     Hyun Koehler MD  Physical Medicine & Rehabilitation     I spent a total of 30 minutes face-to-face or managing the care of Tad Manning. Over 50% of my time on the unit was spent counseling the patient and coordinating care. See note for details.

## 2017-02-21 NOTE — PLAN OF CARE
Problem: Goal/Outcome  Goal: Goal Outcome Summary  Outcome: No Change  Patient did tilt table test in PT today due to orthostasis.  Patient tolerated test but was symptomatic after about 14 minutes. He was slightly lightheaded, slightly diaphoretic, and HR increased to 130's and was irregular.  Dr. Lange notified of new irregular rhythm.  EKG and labs done.  Patient's HR has fluctuated between  throughout the remainder of the shift however patient has been asymptomatic.  Dr. Lange updated again at 1415 for  at rest, pt is asymptomatic.  Addendum: Dr. Lange assessed patient.  Stated his HR fluctuates due to irregularity.  Instructed patient to notify staff if he is symptomatic.

## 2017-02-21 NOTE — PLAN OF CARE
Problem: Goal/Outcome  Goal: Goal Outcome Summary  FOCUS/GOAL  Bowel management, Bladder management, Medical management, Skin integrity and Safety management     ASSESSMENT, INTERVENTIONS AND CONTINUING PLAN FOR GOAL:  Pt is alert and oriented. Continent of bladder x 1 with PVR of 76 cc. Incontinent of small BM. Coccyx reddened, cleaned and barrier cream applied. Pt turned q 2 hours and as requested by pt, which is as often as q 30 minutes. Pt was awake much of shift, minimal sleep noted. Right double lumen PICC is patent and intact, heparin locked. Bed alarm on for safety, uses call light appropriately. Will continue with POC.

## 2017-02-21 NOTE — PROGRESS NOTES
Patient seen and examined    S- noted to be tachycardic to 136 this am with tilt table test and dizzy   As per him, having issues with dizziness on standing for 3-4 weeks  Denies any CP/Palpitaitons/ Nausea/ vomiting.  Was a bit diaphoretic with . Resolved on laying flat   Had bypass in 1995 and has been stable since   4 point ROS done and neg unless mentioned     O-   /76  Pulse 136  Temp 97.6  F (36.4  C) (Oral)  Resp 16  Ht 1.829 m (6')  Wt 90.3 kg (199 lb 1.6 oz)  SpO2 97%  BMI 27 kg/m2   Gen- pleasant  laying on bed  HEENT- NAD, ZENAIDA  Neck- supple, no JVD elevation, no thyromegaly  CVS- I+II+ no m/r/g. Intermittently irregular   RS- CTAB  Abdo- soft, no tenderness . No g/r/r, BS+ no hepatosplenomegaly   Ext- no edema    CNS-  Oriented to person, place and time      LABS:   BMP  Recent Labs  Lab 02/20/17  1559      POTASSIUM 3.4   CHLORIDE 102   JUSTINO 7.8*   CO2 25   BUN 11   CR 0.60*   *     CBC  Recent Labs  Lab 02/20/17  0754 02/17/17  0535   WBC 7.8  --    RBC 2.82*  --    HGB 7.7*  --    HCT 25.0*  --    MCV 89  --    MCH 27.3  --    MCHC 30.8*  --    RDW 16.1*  --      247 189     A/P:  # Orthostatic hypotension: being managed by PMR   - consider Cortisol level (will order for tomorrow)  - Tachy w PAC's on EKG today: ensure electrolytes stable, Orthostatic precautions   - encourage fluids      Other:      T9-T10 diskitis, osteomyelitis with epidural abscess due to Streptococcus mitis:   Continue ceftriaxone 2 grams IV every 12 hours for 6 weeks through 03/24/2017.   The patient should have a CBC, CRP, creatinine and LFTs done weekly.   Followup with Infectious Disease, Dr. Gillian Max on 03/23/2017.     Diabetes mellitus: Continue the Lantus at night along with sliding scale insulin.   Increased lantus to 20 units 2/19  Was on metformin at home , Started metformin 500 mg BID on 2/20    Recent Labs  Lab 02/21/17  1159 02/21/17  0733 02/21/17  0203 02/20/17  2117  02/20/17  1706 02/20/17  1559 02/20/17  1212   GLC  --   --   --   --   --  208*  --    * 168* 162* 224* 186*  --  309*     Monitor blood sugars -will titrate Lantus dose      peripheral vascular disease, status post bilateral lower extremity stents:   Continue aspirin , Plavix( started 2/20)      # Chronic lymphocytic leukemia in remission, last chemo was 09/2015: Follow up with Oncology.   # Coronary artery disease: Continue aspirin 325 mg daily, Lipitor 40 mg daily, atenolol 25 mg daily.   # Spinal cord compression with lower extremity weakness: plan per  Primary team     D/W RN, PMR MD Osmin Lange MD (Pager- [phone])   Internal Medicine/ Hospitalist

## 2017-02-21 NOTE — PROGRESS NOTES
IRF-[facility] CLARIFICATION NOTE FOR ADMIT ASSESSMENT PERIOD  Lowest score for each FIM item supported by available charting    Bladder:  FIM 1 (pt uses urinal, but appears had one accident with spillage requiring total assist to manage)  Bladder Number of Accidents: 1  Bowel: FIM 1 (total assistance on the bedpan)  Bowel Number of Accidents: 0

## 2017-02-22 PROBLEM — I48.92 ATRIAL FLUTTER (H): Status: ACTIVE | Noted: 2017-02-22

## 2017-02-22 LAB
CORTIS SERPL-MCNC: 18.3 UG/DL (ref 4–22)
GLUCOSE BLDC GLUCOMTR-MCNC: 114 MG/DL (ref 70–99)
GLUCOSE BLDC GLUCOMTR-MCNC: 132 MG/DL (ref 70–99)
GLUCOSE BLDC GLUCOMTR-MCNC: 157 MG/DL (ref 70–99)
GLUCOSE BLDC GLUCOMTR-MCNC: 166 MG/DL (ref 70–99)
GLUCOSE BLDC GLUCOMTR-MCNC: 181 MG/DL (ref 70–99)
INTERPRETATION ECG - MUSE: NORMAL

## 2017-02-22 PROCEDURE — 25000132 ZZH RX MED GY IP 250 OP 250 PS 637: Mod: GY | Performed by: PHYSICAL MEDICINE & REHABILITATION

## 2017-02-22 PROCEDURE — 97530 THERAPEUTIC ACTIVITIES: CPT | Mod: GP

## 2017-02-22 PROCEDURE — A9270 NON-COVERED ITEM OR SERVICE: HCPCS | Mod: GY | Performed by: PHYSICAL MEDICINE & REHABILITATION

## 2017-02-22 PROCEDURE — 40000193 ZZH STATISTIC PT WARD VISIT

## 2017-02-22 PROCEDURE — 99221 1ST HOSP IP/OBS SF/LOW 40: CPT | Performed by: INTERNAL MEDICINE

## 2017-02-22 PROCEDURE — 40000225 ZZH STATISTIC SLP WARD VISIT: Performed by: SPEECH-LANGUAGE PATHOLOGIST

## 2017-02-22 PROCEDURE — 97110 THERAPEUTIC EXERCISES: CPT | Mod: GP

## 2017-02-22 PROCEDURE — 92507 TX SP LANG VOICE COMM INDIV: CPT | Mod: GN | Performed by: SPEECH-LANGUAGE PATHOLOGIST

## 2017-02-22 PROCEDURE — 25000125 ZZHC RX 250: Performed by: PHYSICAL MEDICINE & REHABILITATION

## 2017-02-22 PROCEDURE — 93005 ELECTROCARDIOGRAM TRACING: CPT

## 2017-02-22 PROCEDURE — 00000146 ZZHCL STATISTIC GLUCOSE BY METER IP

## 2017-02-22 PROCEDURE — 40000133 ZZH STATISTIC OT WARD VISIT

## 2017-02-22 PROCEDURE — 97535 SELF CARE MNGMENT TRAINING: CPT | Mod: GO

## 2017-02-22 PROCEDURE — 36592 COLLECT BLOOD FROM PICC: CPT | Performed by: INTERNAL MEDICINE

## 2017-02-22 PROCEDURE — A9270 NON-COVERED ITEM OR SERVICE: HCPCS | Mod: GY | Performed by: INTERNAL MEDICINE

## 2017-02-22 PROCEDURE — 25000132 ZZH RX MED GY IP 250 OP 250 PS 637: Mod: GY | Performed by: INTERNAL MEDICINE

## 2017-02-22 PROCEDURE — 25000128 H RX IP 250 OP 636: Performed by: INTERNAL MEDICINE

## 2017-02-22 PROCEDURE — 99233 SBSQ HOSP IP/OBS HIGH 50: CPT | Performed by: INTERNAL MEDICINE

## 2017-02-22 PROCEDURE — 82533 TOTAL CORTISOL: CPT | Performed by: INTERNAL MEDICINE

## 2017-02-22 PROCEDURE — 12800006 ZZH R&B REHAB

## 2017-02-22 RX ADMIN — INSULIN ASPART 1 UNITS: 100 INJECTION, SOLUTION INTRAVENOUS; SUBCUTANEOUS at 18:04

## 2017-02-22 RX ADMIN — METFORMIN HYDROCHLORIDE 500 MG: 500 TABLET ORAL at 07:45

## 2017-02-22 RX ADMIN — Medication: at 07:49

## 2017-02-22 RX ADMIN — ACETAMINOPHEN 1000 MG: 500 TABLET, FILM COATED ORAL at 21:33

## 2017-02-22 RX ADMIN — ASPIRIN 325 MG ORAL TABLET 325 MG: 325 PILL ORAL at 07:45

## 2017-02-22 RX ADMIN — CEFTRIAXONE 2 G: 2 INJECTION, POWDER, FOR SOLUTION INTRAMUSCULAR; INTRAVENOUS at 14:33

## 2017-02-22 RX ADMIN — TRAMADOL HYDROCHLORIDE 50 MG: 50 TABLET, FILM COATED ORAL at 21:33

## 2017-02-22 RX ADMIN — CLOPIDOGREL BISULFATE 75 MG: 75 TABLET, FILM COATED ORAL at 07:45

## 2017-02-22 RX ADMIN — Medication 12.5 MG: at 23:14

## 2017-02-22 RX ADMIN — INSULIN ASPART 1 UNITS: 100 INJECTION, SOLUTION INTRAVENOUS; SUBCUTANEOUS at 07:48

## 2017-02-22 RX ADMIN — TAMSULOSIN HYDROCHLORIDE 0.8 MG: 0.4 CAPSULE ORAL at 21:33

## 2017-02-22 RX ADMIN — ACETAMINOPHEN 1000 MG: 500 TABLET, FILM COATED ORAL at 07:45

## 2017-02-22 RX ADMIN — Medication: at 21:32

## 2017-02-22 RX ADMIN — ATORVASTATIN CALCIUM 40 MG: 40 TABLET, FILM COATED ORAL at 07:45

## 2017-02-22 RX ADMIN — ATENOLOL 25 MG: 25 TABLET ORAL at 07:45

## 2017-02-22 RX ADMIN — SODIUM CHLORIDE, PRESERVATIVE FREE 10 ML: 5 INJECTION INTRAVENOUS at 21:33

## 2017-02-22 RX ADMIN — OXYCODONE HYDROCHLORIDE AND ACETAMINOPHEN 500 MG: 500 TABLET ORAL at 07:45

## 2017-02-22 RX ADMIN — CEFTRIAXONE 2 G: 2 INJECTION, POWDER, FOR SOLUTION INTRAMUSCULAR; INTRAVENOUS at 01:11

## 2017-02-22 RX ADMIN — SODIUM CHLORIDE, PRESERVATIVE FREE 4 ML: 5 INJECTION INTRAVENOUS at 05:58

## 2017-02-22 RX ADMIN — METFORMIN HYDROCHLORIDE 500 MG: 500 TABLET ORAL at 18:04

## 2017-02-22 RX ADMIN — VITAMIN D, TAB 1000IU (100/BT) 1000 UNITS: 25 TAB at 07:45

## 2017-02-22 RX ADMIN — TRAMADOL HYDROCHLORIDE 50 MG: 50 TABLET, FILM COATED ORAL at 08:16

## 2017-02-22 RX ADMIN — ACETAMINOPHEN 1000 MG: 500 TABLET, FILM COATED ORAL at 14:32

## 2017-02-22 NOTE — PROGRESS NOTES
"CLINICAL NUTRITION SERVICES - REASSESSMENT NOTE     Nutrition Prescription    RECOMMENDATIONS FOR MDs/PROVIDERS TO ORDER:  -None at this time.    Malnutrition Status:    Severe malnutrition in the context of acute illness    Recommendations already ordered by Registered Dietitian (RD):  -Changed Ensure Plus to send bid with meals (vanilla and strawberry flavor) plus a cup of ice per pt preference.  -Ordered weight recheck    Future/Additional Recommendations:  Continue consistent carb diet + supplements. Consider diet liberalization if intake remains poor and blood sugars are in good control. Encourage intake of meals/supplements/fluids to support adequate nutrition and healing.     EVALUATION OF THE PROGRESS TOWARD GOALS   Diet: Moderate Consistent Carbohydrate Diet (3174-3006 kcals per day)  Ensure Plus strawberry bid between meals at 10 am and 2 pm daily  Room Service with Assist    Intake: PO intakes have been variable over past week from % of recorded meals (but only 6 meal records available).  Per review of selections in [employer] [facility], pt orders smaller meals tid.  Noted pt has many Ensure Plus supplements at bedside.  He states he doesn't have time to drink the supplements and prefers to receive with meals.     NEW FINDINGS   -Per Hospitalist MD note this am:  \"again had severe dizziness with PT this am moving from chair with tachycardia (BP remains stable)\"  Encourage fluids.    -Weight (2/22)  190 lbs 12.8 oz  (bedscale)  ARU Admission Weight (2/15)  199 lbs 1.6 oz  [place] Admission Weight-no weights available  [facility] Admission Weight (2/7/19)  179 lbs  Previous [facility] Admission Weight (1/22)  189 lbs 13.1 oz      Per initial ARU RD assessment (2/16) pt estimated current weight to be ~ 195 lbs.    ? Accuracy of weights per hx above.  Will order weight recheck-standing if able.    -Per WOCN (2/16):  \"Moisture associated dermatitis to perineum\"    BG (2/21)  162-224    MALNUTRITION  % " Intake: < 75% for > /= 1 month (severe)   % Weight Loss: > 7.5% in 3 months (severe)  Subcutaneous Fat Loss: None observed  Muscle Loss: Thoracic region (clavicle, acromium bone, deltoid, trapezius, pectoral): Mild/moderate and Upper arm (bicep, tricep): Mild/moderate  Fluid Accumulation/Edema: None noted  Malnutrition Diagnosis: Severe malnutrition in the context of acute illness.    Previous Goals   Patient to consume % of nutritionally adequate meal trays TID, or the equivalent with supplements/snacks.  Evaluation: Not met    Previous Nutrition Diagnosis  Inadequate oral intake related to decreased appetite as evidenced by intake of 25% of meals with noted significant weight loss over the past few months.   Evaluation: Improving?/updated below    CURRENT NUTRITION DIAGNOSIS  Inadequate oral intake related to decreased appetite as evidenced by variable intake of 25%-100% of recorded meals over past week, not consistently taking supplements and possible continued weight loss.    INTERVENTIONS  Implementation  Nutrition Education:  Encouraged intakes of adequate fluids and tid meals plus supplements .    Changed Ensure Plus to send bid with meals along with a cup of ice-vanilla and strawberry flavors.    Discussed with RN to encourage intakes of supplements and offer over ice.    Ordered weight recheck.    Goals  1. Patient to consume % of nutritionally adequate meal trays TID, or the equivalent with supplements/snacks.  2. Weight maintenance--pending recheck.    Monitoring/Evaluation  Progress toward goals will be monitored and evaluated per protocol.    Carolina Mishra RD, LD

## 2017-02-22 NOTE — PROGRESS NOTES
[facility]   Acute Rehabilitation Unit  Daily progress note    interval history  Tad Manning was seen and examined at bedside. No issues overnight; slept ok - interrupted by repositioning q2h. Denies any pain or discomfort. Was dizzy and diaphoretic during his OT session this am with tachycardia and slightly elevated BP. Improved with no intervention -didn't have abd binder in place. No chest pain, or palpitation.     Spoke with Dr. Lange hospitalist, repeat EKG this am showed atrial flutter with variable blocks. Cortisol level was wnl. Plan is to repeat TTE for further eval; no change in meds today. Discussed with nursing staff and therapy team to ensure to have abd binder in place wit TEDs before getting OOB.    Participation is limited by medical issues; he remains motivated and would like to continue his rehab. Working with SLP on memory and making progress. Team rounds today to discuss rehab course and discharge plan.     medications    metFORMIN  500 mg Oral BID w/meals     clopidogrel  75 mg Oral Daily     insulin glargine  20 Units Subcutaneous QAM AC     acetaminophen  1,000 mg Oral TID     miconazole   Topical BID     ascorbic acid (VITAMIN C) tablet 500 mg  500 mg Oral Daily     aspirin  325 mg Oral Daily     atenolol  25 mg Oral Daily     atorvastatin  40 mg Oral Daily     cefTRIAXone  2 g Intravenous Q12H     cholecalciferol  1,000 Units Oral Daily     tamsulosin  0.8 mg Oral At Bedtime     insulin aspart  1-7 Units Subcutaneous TID AC     insulin aspart  1-5 Units Subcutaneous At Bedtime     heparin lock flush  5-10 mL Intracatheter Q24H     tramadol, oxyCODONE, senna-docusate, polyethylene glycol, bisacodyl, - MEDICATION INSTRUCTIONS -, lidocaine, glucose **OR** dextrose **OR** glucagon, naloxone, sodium chloride (PF), heparin lock flush       physical exam  /76 (BP Location: Left arm)  Pulse 85  Temp 97.7  F (36.5  C) (Oral)  Resp 16  Ht 1.829 m  "(6')  Wt 86.5 kg (190 lb 12.8 oz)  SpO2 94%  BMI 25.88 kg/m2     Gen: NAD, resting in bed   HEENT: has glasses   Cardio: irregular rhythm   Pulm: clear breath sounds b/l   Abd: soft and non-tender   Ext: no edema in legs today, no tenderness in calves   Neuro/MSK: unchanged   alert, diminished sensation in feet, strength improving in bilateral lower extremities - now antigravity in all muscle groups       labs  BG in good range   Cortisol 18.3    assessment and plan  Tad Manning is a 76 year old right hand dominant male with 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 Medical co-morbidities include post-op pain, anemia, hyponatremia, and h/o bacteremia on IV antibiotic. PMH significant for DM with peripheral neuropathy, HTN, HLD, CAD s/p CABG, systolic and diastolic heart failure, and CLL in remission.      Functional deficits are weakness in bilateral lower extremities, sensory loss in feet (due to diabetic neuropathy but worse likely due to SCI), neurogenic bladder and bowel, and acute on chronic deconditioning with poor endurance.         Admission to acute inpatient rehab 2/15.     Rehabilitation: please see interval history for updates. Ordered SLP on 2/16 given his low score on SLUMS 23/30.    SLP on 2/20: \"Completed Visual-Auditory Learning subtests of [employer]J-[employer]. Pt scored standard score of 121, placing him in the 92nd percentile\".    Medical Conditions; appreciate hospitalist team assistance in medical management.      # Incomplete SCI:  # Thoracic osteomyelitis/discitis with epidural abscess:  # S/p T9-10 laminectomy, T7-12 posterior arthrodesis and TF right sided T9-10 fusion.   # Strep mitis bacteremia:      --TLSO for comfort  --wound care per recs; will remove sutures at 2 weeks higinio ~ 2/26  --PICC line care on the right arm   --pain management with lidoderm patch, prn tylenol, oxycodone and flexeril upon " admission - will adjust as needed.    2/17 dc'd lidoderm patch as he was refusing to use. Consider to add long acting.    2/19 tylenol was scheduled to help with pain and minimize narcotic use. Also tramadol was added to again minimize oxycodone intake due to side effects like urinary retention.    2/21 dc'd flexeril as he was not requiring.    2/22 requiring tramadol and oxycodone x1-2/day; will monitor for one more day and dc oxycodone   --continue IV rocephin for total of 6 weeks - end date 3/24  --should f/u with ID and neurosurgery teams as outpatient      ** Check CBC, CRP, creatinine, and LFTs weekly while on therapy and have results faxed to the [facility] at [phone]. - faxed on 2/21     Hyponatremia: resolved since 2/12 and stable ~ 131. 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 Will repeat labs on Monday. 2/20 Na 137.       Anemia: likely due to acute illness and blood loss; stable since his surgery. Will monitor clinically and repeat labs as needed.      # DM type II with neuropathy: on glipizide and victoza injections prior to admission. HgbA1C was 6.1% on 2/10. Currently on lantus 12 units daily and SSI. 2/17 lantus was increased to 14 for better control. 2/21 lantus increased to 20 daily and also was started on metformin 500 bid.      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 Continue ASA; plavix restarted on 2/20.        # HTN: on amlodipine, atenolol, lisinopril and prinzide (?) prior to admission. Currently on atenolol 25 daily; BP has been variable - see below.    # Orthostatic hypotension: as confirmed by PT on 2/17. Multifactorial due to anemia, severe deconditioning and possibly autonomic instability due SCI. Should have TEDs and abdominal binder before getting out of bed. Ensure adequate hydration. Orthostatic precautions.       # Tachycardia with irregular rhythm: occurred during PT  session on 2/21 while on tilt table; tachycardia is multifactorial as orthostatic hypotension. Spoke with Dr. Lange hospitalist, appreciated assistance. Repeat BMP wnl today; plan is to obtain cortisol level in am and continue to monitor him clinically for now.  Follow orthostatic precautions. Asymptomatic at this point. 2/22 repeat EKG with atrial flutter and variable block. Will repeat TTE today.       # HLD: on lipitor 40 daily. LDL was 48 in 5/2014 when last checked.         # CAD s/p CABG in 1995 with HFrEF (EF 40-45%): no active issues - continue ASA, lipitor and atenolol. Low threshold to repeat echo if any clinical s/s of heart failure. 2/22 repeat TTE today.      # H/o CLL in remission: last chemo in 2015; is followed by oncology team - no active issues.      2. Adjustment to disability: seems to be coping well. Clinical psychology to eval and treat as needed.   3. FEN: regular, mod consistent CHO.   4. Bowel: ? Neurogenic bowel - on prn bowel meds upon admission with last BM on 2/14. Will monitor and adjust meds/bowel regimen as needed. 2/17 scheduled bowel meds. 2/20 had loose BMs over the weekend so meds were changed to prn; started on metformin which can be contributing as well.   5. Bladder: ? Neurogenic bladder - h/o BPH on flomax prior to admission; was not resumed after admission to the hospital likely due to low BP but restarted upon admission to ARU. PVRs elevated and was cathed once on 2/18. Plan is to minimize narcotic use, void in standing/sitting position as much as possible, and continue to monitor PVRs.   6. DVT Prophylaxis: mechanical; per neurosurgery team notes Ok to start chemical prophylaxis on 2/16 - will clarify. Discussed and started lovenox 40 daily on 2/17. 2/21 dc'd lovenox per hospitalist team recs as he was started on plavix.   7. GI Prophylaxis: none, oral intake.   8. Code: full.   9. Disposition: home vs TCU pending progress and family support. Will discuss in team rounds.    10. ELOS:  3-4 weeks.   11. Follow up Appointments on Discharge: ID, neurosurgery, PCP and PM&R.   Follow-up with neurosurgery, Dr. Marcelo Trent MD in 2 weeks for wound check.       Follow-up with Infectious Disease, Dr. Gillian Vila, on 3/23/17. If you have not heard back regarding your appointment time please call [phone] and ask to page the Infectious Disease fellow regarding your pre-planned appointment with Dr. Vila on 3/23/17 to assess antibiotic regimen and progress     Hyun Koehler MD  Physical Medicine & Rehabilitation     I spent a total of 35 minutes face-to-face or managing the care of Tad Dumont Alexlillian. Over 50% of my time on the unit was spent counseling the patient and coordinating care. See note for details.

## 2017-02-22 NOTE — PLAN OF CARE
Problem: Goal/Outcome  Goal: Goal Outcome Summary  OT: In AM session pt. Transferred back to bed once up in chair for G/H d/t high HR and symptomatic. /74 - 132/77 within session although 's-140. Pt. Became somewhat diaphoretic and dizzy. Symptoms resolved once supine.

## 2017-02-22 NOTE — CONSULTS
Cardiology Inpatient Consultation  February 22, 2017    Reason for Consult:  A cardiology consult was requested by Dr. Lange from the physical medicine service to provide clinical guidance regarding new diagnosis of atrial flutter.    HPI:   Tad Manning is a 76 year old male with history of CAD s/p 3 vessel CAB in 1995, cardiomyopathy with LVEF 40-45%, DM, CLL in remission, HTN and PVD s/p iliac artery stents and left lower extremity bypass who was transferred from [facility] for surgery to address an epidural abscess and infectious spondylitis. He had fallen at home prompting his visit to [facility]. He had been experiencing increasingly frequent falls associated with bilateral leg weakness and lower back pain. 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 He was transferred to [facility] for urgent surgical decompression of the spinal cord. Subsequently he was taken back to the OR on 2/13 for T7-12 posterior fusion with TLIF T9-10. All this further complicated by blood cultures which grew strep mitis for which ID has been following.     He was admitted to the acute rehab unit on 2/15. With PT he's had ongoing issues with lightheadedness and dizziness upon position change from laying to sitting or standing. According to the patient this has been on ongoing issue for him for several weeks prior to admission. He was found to have vital signs consistent with orthostatic hypotension. Blood pressure lying was recorded as 144/65. Upon sitting up blood pressure dropped to 114/65. HR was unchanged at 78 bpm.     Yesterday he was noted to have elevated heart rates in the 130s with acitivty. An EKG was obtained and identified atrial flutter with frequent PVCs and controlled heart rate. At rest heart rates look to be controlled. With subsequent PT and OT sessions heart rates again pick back up to the  130s and 140s. EKG was repeated today and again shows atrial flutter with heart rate of 88 bpm.     At the time of interview, the patient denies chest pain or dyspnea at rest or with exertion. He has no awareness of the arrhythmia denying any palpitations. He states that in the past he's been told he has had some irregular heart beats, but does not recall ever being told he was atrial fibrillation or atrial flutter.     Review of Systems:    Complete review of systems was performed and negative except per HPI.    PMH:  Past Medical History   Diagnosis Date     Arthritis      ASCVD (arteriosclerotic cardiovascular disease)      BMI 30.0-30.9,adult      BPH (benign prostatic hypertrophy)      Cellulitis      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)      Coronary artery disease      triple bypass 1995     Diabetes mellitus (H)      Diabetic polyneuropathy (H)      History of blood transfusion      Hyperlipidaemia      Hypertension      Malignant neoplasm (H)      CLL     Noninflammatory pericardial effusion      Osteomyelitis of left foot (H)      PVD (peripheral vascular disease) (H)      Sebaceous cyst      Active Problems:  Patient Active Problem List    Diagnosis Date Noted     Osteomyelitis of thoracic spine (H) 02/15/2017     Priority: Medium     Epidural abscess 02/09/2017     Priority: Medium     Discitis 02/08/2017     Priority: Medium     Fall 02/07/2017     Priority: Medium     Renal failure 01/20/2017     Priority: Medium     Renal insufficiency 01/20/2014     Priority: Medium     Diabetes mellitus, type 2 (H) 01/11/2014     Priority: Medium     HTN (hypertension) 01/11/2014     Priority: Medium     CAD (coronary artery disease) 01/11/2014     Priority: Medium     Hyperlipidemia with target LDL less than 100 01/11/2014     Priority: Medium     Diagnosis updated by automated process. Provider to review and confirm.       Osteomyelitis left 2nd Toe s/p amputaion 1/10/14  01/11/2014     Priority: Medium     Critical lower limb ischemia , s/p L AK pop to BK pop bypass with rGSV on 1/10/2014 01/10/2014     Social History:  Social History   Substance Use Topics     Smoking status: Former Smoker     Packs/day: 2.00     Years: 30.00     Types: Cigarettes     Quit date: 6/19/1995     Smokeless tobacco: Never Used     Alcohol use Yes      Comment: 1 drink monthly     Family History:  Family History   Problem Relation Age of Onset     CANCER Mother      leukemia       Medications:    metFORMIN  500 mg Oral BID w/meals     clopidogrel  75 mg Oral Daily     insulin glargine  20 Units Subcutaneous QAM AC     acetaminophen  1,000 mg Oral TID     miconazole   Topical BID     ascorbic acid (VITAMIN C) tablet 500 mg  500 mg Oral Daily     aspirin  325 mg Oral Daily     atenolol  25 mg Oral Daily     atorvastatin  40 mg Oral Daily     cefTRIAXone  2 g Intravenous Q12H     cholecalciferol  1,000 Units Oral Daily     tamsulosin  0.8 mg Oral At Bedtime     insulin aspart  1-7 Units Subcutaneous TID AC     insulin aspart  1-5 Units Subcutaneous At Bedtime     heparin lock flush  5-10 mL Intracatheter Q24H         - MEDICATION INSTRUCTIONS -         Physical Exam:  Temp:  [96.3  F (35.7  C)-97.7  F (36.5  C)] 97.7  F (36.5  C)  Pulse:  [] 85  Resp:  [16] 16  BP: (125-150)/(61-80) 125/76  SpO2:  [94 %-99 %] 94 %    Intake/Output Summary (Last 24 hours) at 02/22/17 1344  Last data filed at 02/22/17 0800   Gross per 24 hour   Intake                0 ml   Output             1125 ml   Net            -1125 ml     GEN: pleasant, no acute distress  HEENT: no icterus  CV: S1S2 irr irr. No murmur appreciated.    CHEST: clear to ausculation bilaterally, no rales or wheezing  ABD: soft, non-tender, normal active bowel sounds  EXTR: pulses diminished to lower extremities. No clubbing or cyanosis. Bilateral pedal/ankle edema L>R  NEURO: alert oriented, speech fluent/appropriate, motor grossly  nonfocal    Diagnostics:  All labs and imaging were reviewed, of note:    All laboratory and imaging data in the past 24 hours reviewed    Lab Results   Component Value Date    TROPI  02/07/2017     <0.015  The 99th percentile for upper reference range is 0.045 ug/L.  Troponin values in   the range of 0.045 - 0.120 ug/L may be associated with risks of adverse   clinical events.         EKG:              Transthoracic echocardiogram:   1/21/17  The left ventricle is mildly dilated at 6.0 cm. Left ventricular systolic  function is mild to moderately reduced as the visual ejection fraction is  estimated at 40-45%.  The biplane calculated LVEF = 43%. This decrease in the LVEF is due to mild  global hypokinesia and moderate to severe inferolateral wall hypokinesis.  Grade I or early diastolic dysfunction is noted  There is trace tricuspid regurgitation. The right ventricular systolic  pressure is normal approximated at 16mmHg plus the right atrial pressure.  The left atrium is moderately dilated by volume criteria at 41.6 ml/m2.  There is mild trileaflet aortic sclerosis but no aortic stenosis or  regurgitation is present.  _____________________________________________________________________________  __        Left Ventricle  The left ventricle is mildly dilated. at 6.0 cm. There is normal left  ventricular wall thickness. Grade I or early diastolic dysfunction. E by E  prime ratio is less than 8, that likely suggests normal left ventricular  filling pressures. The visual ejection fraction is estimated at 40-45%. The  biplane calculated LVEF = 43%. Left ventricular systolic function is mild to  moderately reduced. There is mild global hypokinesia of the left ventricle.  There is moderate to severe inferolateral wall hypokinesis. There is no  thrombus seen in the left ventricle.     Right Ventricle  Normal right ventricle structure and size. The right ventricular systolic  function is mildly reduced.     Atria  The left  atrium is moderately dilated. Left atrial enlargement is noted by  volume criteria. at 41.6 ml/m2. Right atrial size is normal. Intact atrial  septum.     Mitral Valve  There is mild to moderate mitral annular calcification. There is no evidence  of mitral valve prolapse. There is no mitral valve stenosis.        Tricuspid Valve  The tricuspid valve is normal in structure and function. Right ventricle  systolic pressure estimate normal. There is trace tricuspid regurgitation. The  right ventricular systolic pressure is approximated at 16.6 mmHg plus the  right atrial pressure. The right ventricular systolic pressure is approximated  at 16mmHg plus the right atrial pressure.     Aortic Valve  The aortic valve is normal in structure and function. There is mild trileaflet  aortic sclerosis. No aortic regurgitation is present. No aortic stenosis is  present.     Pulmonic Valve  The pulmonic valve is not well seen, but is grossly normal. There is trace  pulmonic valvular regurgitation.     Vessels  Normal size aorta. The inferior vena cava is not dilated.     Pericardium  The pericardium appears normal. There is no pleural effusion.        Rhythm  The rhythm was normal sinus.      Nuclear stress test:    Assessment and Recommendation:    Mr. Manning is a 76 year old male with history of CAD s/p CAB in 1995 as well as mild chronic left ventricular systolic dysfunction who was hospitalized after suffering a fall and experiencing ongoing back pain and weakness involving the lower limbs. 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 Subsequently he was taken back to the OR for T7-12 posterior fusion with TLIF T9-10. Additionally he's being treated for bacteremia. Currently participating in rehab  on the acute rehab unit. Problems with positional dizziness prompted blood pressure and heart rate checks. He's been found to have orthostatic hypotension, but over the past couple of days has also been noted to have high heart rates with activity. EKGs have identified atrial flutter. Cardiology has been asked to consult to help manage his arrhythmia.       1. Atrial flutter  -currently on atenolol 25 mg daily; heart rates are controlled at rest but up to 130-140 with activity; blood pressures are stable and resting could tolerate an increase in beta blocker; however this is complicated by symptomatic orthostatic hypotension  -CHADSVASC score is 4 (CHF, HTN, Age, DM, vascular disease) and long term AC would be recommended; however risk vs benefit would need to closely evaluated. With history of frequent falls we would be concerned about head injury. That being said, if falls were related to spinal cord impingement this may no longer be as much of a concern. Currently he is on ASA only.   -at this point, given that he is asymptomatic, we would recommend continuing with present management including beta blocker; he will need cardiology follow up as an outpatient in 2 months at which time we will need to once again address the potential for AC and further management of atrial flutter. If AC is commenced in the future and he is to remain on the plavix, we would discontinue the ASA given significant bleeding risk with triple therapy.  -agree with echocardiogram; results pending    2. Orthostatic hypotension  -suspect his symptoms of dizziness are related to blood pressure drop with position change and not atrial flutter  -agree with compression stockings and abdominal binder.  -maintain hydration  -would also recommend lower extremity isometric exercises prior to position change; especially sitting to standing  -avoid resting hypotension    3. CAD  -currently stable; no complaints of angina or anginal equiv  -continue  with beta blocker, ASA, and statin      I have discussed the above with Dr. Alejandro.    Thank you for consulting the cardiovascular services at the [facility]. Please do not hesitate to call us with any questions.     OFELIA Singh, CNP  [facility] Cardiology Consult Team  Pager [phone]    I have seen, interviewed, and examined patient. I reviewed the management plan with the patient.  I have reviewed the laboratory tests, imaging, and other investigations.  Discussed with the team and agree with the findings and plan in this resident/fellow/nurse practitioner's note. In addition, changes in the physical examination, assessment and plan have been incorporated into the note by myself, as to make it a single cohesive document. Plan was communicated to referring team/physician.      Magdalene Alejandro MD, MS  Cardiology/Cardiac EP Attending Staff

## 2017-02-22 NOTE — PLAN OF CARE
Problem: Goal/Outcome  Goal: Goal Outcome Summary  Outcome: No Change  Patient is alert & oriented x3. Able to communicate needs. Denies cp/sob. VSS. Transfer with assist of 2 with liko lift. Extensive assist with turn and repositioning. Incontinent of bowel. Voiding in urinal independently, staff empties urinal. Patient voids in smal amount each time. Bladder scanned for 148 after voiding 250cc. Appetite is fair, encouraged  fluids intake. Blood glucose monitored as directed, insulin coverage given for dinner , no coverage required at HS per parameter were not met. Picc patent and HL. Incision on his back ALISHA.  Continue turn and repositioning q2hrs. Call light within reach. Continue to monitor per care plan.

## 2017-02-22 NOTE — PLAN OF CARE
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 PMH significant for DM with peripheral neuropathy, HTN, HLD, CAD s/p CABG, systolic and diastolic heart failure, and CLL in remission.     Safety: fall risk    Pain: Most times patient denied pain but occasionally complains of back pain. Pain med available.     Medications, Skin, Tubes/Lines: Patient needs to be evaluated for MAP, patient may do well. As for skin, there is an incision on his mid back with sutures ALISHA. There is also a red spot on his coccyx - ALISHA. No tubes/lines present.    Swallowing/Nutrition:    Bowel/Bladder: At times patient is incontinent to bowel and bladder. At other times he uses the urinal.    Psychosocial: Pt lives with Teresa BLACKMON with goal to return home. Team working towards a safe d/c plan.     ADLs/IADLs: Patient early in rehab stay and limited in overall therapy progress to medical concerns and limited tolerance for upright sitting. Patient progressing towards slide board transfers bed <> w/c and w/c BSC. Patient has been able to participate in FB dressing with min A while supine in bed, bed mobility with mod A and able to participate in seated activity for 15-20 minutes. Patient will continue to benefit from IP stay in order to continue to progress with independence with ADLs.     Mobility: Pt currently w/c based only for mobility and transferring total assist with Liko lift. Therapy is being limited due to medical issues but plan to sliding board transfer train in  the next few days as able. Pt able to roll left and right for bed mobility using bed rail, min A with legs. Sit <> supine needing min-mod A for B legs. Pt continues to benefit from IP PT services to improve mobility, strengthening and balance training. POC reviewed, updated to reflect w/c based mobility.  Recommend ongoing IP care at this time.     Cognition/Language:SLP: pt has deficits for complex language skills (verbal and written expression, auditory and reading comprehension), also goals for reasoning, memory. Pt will benefit from medication program.      Community Re-Entry: Pt currently w/c based and would need assist for community distances.    Transportation: Not a  due to paraplegia; w/c transfer may be a barrier, may need w/c based transportation    Decision maker: self    Plan of Care and goals reviewed and updated.    Discharge Plan/Recommendations    Fall Precautions: continue    Overall plan for the patient: participation has been limited by medical issues including orthostatic hypotension, fatigue, anemia, possible autonomic instability due to SCI, and new atrial flutter. Medicine and cardiology teams are following; some work-up is pending. Some functional improvement since admission with better pain control. Will continue plan of care and discuss again next week.       Utilization Review and Continued Stay Justification    Medical Necessity Criteria:    For any criteria that is not met, please document reason and plan for discharge, transfer, or modification of plan of care to address.    Requires intensive rehabilitation program to treat functional deficits?: Yes    Requires 3x per week or greater involvement of rehabilitation physician to oversee rehabilitation program?: Yes    Requires rehabilitation nursing interventions?: Yes    Patient is making functional progress?: Yes    There is a potential for additional functional progress? Yes    Patient is participating in therapy 3 hours per day  a minimum of 5 days per week or 15 hours per week in 7 day period?:Yes    Has discharge needs that require coordinated discharge planning approach?:Yes      Final Physician Sign off    Statement of Approval: I agree with all the recommendations detailed in this document.      Patient Goals      1. Pt will d/c home/community setting upon completion of therapy/RN goals.  2. Pt and family will be involved in d/c planning and made aware of services available to meet pts needs.         OT target date for goal attainment: 03/16/17  OT Frequency: daily  min  OT goal: hygiene/grooming: independent  OT goal: upper body dressing: Independent, including set-up/clothing retrieval, within precautions  OT goal: lower body dressing: Minimal assist, including set-up/clothing retrieval, within precautions  OT goal: upper body bathing: Supervision/stand-by assist, within precautions  OT goal: lower body bathing: Supervision/stand-by assist, with precautions (seated)  OT Goal: transfer: Supervision/stand-by assist, within precautions (walk in shower transfer with use of GB and tub bench)  OT goal: toilet transfer/toileting: Modified independent, cleaning and garment management, toilet transfer, within precautions  OT goal: meal preparation: Modified independent, with simple meal preparation, within precautions (Wc based  OT goal: cognitive: Patient/caregiver will verbalize understanding of cognitive assessment results/recommendations as needed for safe discharge planning  OT goal 1: Pt will be able to complete ADL and functiopnal mobility with useopf spinal precautions without cues for following precautions and restrictions.   OT goal 2: Pt will be I with UB HEP to impriove UB strength for functional mobiltiy, transfer, and bed mobility  OT/ALISHA face-to-face collaboration occurred, patient progress and plan of care reviewed.      PT target date for goal attainment: 03/16/17  PT Frequency: daily x 60 minutes  PT goal: bed  mobility: Modified independent, Supine to/from sit, Rolling, Bridging, Within precautions  PT goal: transfers: Modified independent, Sit to/from stand, Bed to/from chair, Assistive device, Within precautions  PT goal: gait: 100 feet, Minimal assist, Rolling walker     PT goal: perform aerobic activity with stable cardiovascular response: 15 minutes, NuStep, continuous activity     PT goal 1: Patient will perform car transfer Mod I.   PT Goal 2: Patient will propel wheelchair Mod I x 300' in order to access the community.   PT Goal 3: Patient will be independent with HEP for strengthening and ROM in order to continuing working towards strength in home setting.   PT Goal 4: Patient will attend falls class in order to improve safety awareness in the home setting to prevent falls.             SLP Frequency: daily for 60 minutes per day  SLP goal 1: Pt will complete moderate to complex level auditory and reading comprehension tasks with 90% accuracy and minimal prompts and cues  SLP goal 2: Pt will demonstrate complex level verbal/written expression tasks with 90% accuracy and minimal prompts and cues.   SLP goal 3: Pt will utilize internal and external memory strategies to recall 3 bits of information after 15 minutes lapse of time over three sessions  SLP goal 4: Pt will complete moderate to complex level deductive /numerical reasoning and problem solving tasks with 90% accuracy and minimal prompts and cues.          Patient Goal:  Pain Management: Pt will verbalize understanding of medications taken for pain and how to properly mange pain using alternative methods by discharge.      Goal: Skin Integrity: Pt will demonstrate proper ways to reposition self and verbalize different ways to prevent skin break down by 2/22/17.     Goal: Reinforcement of self care/ADL: Pt will demonstrate methods taught to him to manage his personal hygiene and bathroom cares by discharge.   Goal: Dress/Undress: Pt will demonstrate dressing  himself independently using proper assistive devices by discharge.

## 2017-02-22 NOTE — PLAN OF CARE
Problem: Goal/Outcome  Goal: Goal Outcome Summary  FOCUS/GOAL  Medical management     ASSESSMENT, INTERVENTIONS AND CONTINUING PLAN FOR GOAL:              Pt having some symptomatic episodes of tachycardia. Some diaphoresis and dizziness when up with therapy. HR up to around 140. MD notified and cardiology consulted as well as an echo and an EKG. Will continue to monitor during activity and encourage abdominal binder when OOB. MD to discuss any HR parameters that need to be considered by therapy and nursing as well.

## 2017-02-22 NOTE — PROGRESS NOTES
Patient seen and examined    S- again had severe dizziness with PT this am moving from chair with tachycardia (BP remains stable)   - Denies any CP/Palpitaitons/ Nausea/ vomiting.      4 point ROS done and neg unless mentioned     O-   /76 (BP Location: Left arm)  Pulse 85  Temp 97.7  F (36.5  C) (Oral)  Resp 16  Ht 1.829 m (6')  Wt 86.5 kg (190 lb 12.8 oz)  SpO2 94%  BMI 25.88 kg/m2   Gen- pleasant  laying on bed  HEENT- NAD, ZENAIDA  Neck- supple, no JVD elevation, no thyromegaly  CVS- I+II+ no m/r/g. Intermittently irregular   RS- CTAB  Abdo- soft, no tenderness . No g/r/r, BS+ no hepatosplenomegaly   Ext- no edema    CNS-  Oriented to person, place and time      LABS:   BMP    Recent Labs  Lab 02/21/17  1242 02/20/17  1559    137   POTASSIUM 3.6 3.4   CHLORIDE 102 102   JUSTINO 8.4* 7.8*   CO2 26 25   BUN 11 11   CR 0.56* 0.60*   * 208*     CBC    Recent Labs  Lab 02/20/17  0754 02/17/17  0535   WBC 7.8  --    RBC 2.82*  --    HGB 7.7*  --    HCT 25.0*  --    MCV 89  --    MCH 27.3  --    MCHC 30.8*  --    RDW 16.1*  --      247 189     Echo 1/21:  The left ventricle is mildly dilated at 6.0 cm. Left ventricular systolic  function is mild to moderately reduced as the visual ejection fraction is  estimated at 40-45%.  The biplane calculated LVEF = 43%. This decrease in the LVEF is due to mild  global hypokinesia and moderate to severe inferolateral wall hypokinesis.  Grade I or early diastolic dysfunction is noted  There is trace tricuspid regurgitation. The right ventricular systolic  pressure is normal approximated at 16mmHg plus the right atrial pressure.  The left atrium is moderately dilated by volume criteria at 41.6 ml/m2.  There is mild trileaflet aortic sclerosis but no aortic stenosis or  regurgitation is present.    Cortisol- 18     A/P:  # Orthostatic Dizziness and Tachycardia: worse on tilt table   - Tachy w PAC's and fusion comlex on EKG 2/22: Atrial flutter with variable block  2/23.   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   The patient should have a CBC, CRP, creatinine and LFTs done weekly.   Followup with Infectious Disease, Dr. Gillian Max on 03/23/2017.     Diabetes mellitus: Continue the Lantus at night along with sliding scale insulin.   Increased lantus to 20 units 2/19  Was on metformin at home , Started metformin 500 mg BID on 2/20    Recent Labs  Lab 02/22/17  0737 02/22/17  0212 02/21/17  2101 02/21/17  1733 02/21/17  1242 02/21/17  1159 02/21/17  0733  02/20/17  1559   GLC  --   --   --   --  206*  --   --   --  208*   * 114* 173* 182*  --  224* 168*  < >  --    < > = values in this interval not displayed.  Monitor blood sugars -will titrate Lantus dose      peripheral vascular disease, status post bilateral lower extremity stents:   Continue aspirin , Plavix( started 2/20)      # Chronic lymphocytic leukemia in remission, last chemo was 09/2015: Follow up with Oncology.   # Coronary artery disease: Continue aspirin 325 mg daily, Lipitor 40 mg daily, atenolol 25 mg daily.   # Spinal cord compression with lower extremity weakness: plan per  Primary team     D/W RN, PMR MD , Cardiology    Osmin Lange MD (Pager- [phone])   Internal Medicine/ Hospitalist

## 2017-02-22 NOTE — PLAN OF CARE
Problem: Goal/Outcome  Goal: Goal Outcome Summary  Outcome: Therapy, progress toward functional goals as expected  SLP: pt and brother report pt has had decline in memory few weeks before hospitalization.  Pt did well with reading task - medicine label, also showed improvement with auditory comprehension of paragraph level discourse.  WFL for giving definitions of words and confrontation naming.  Pt needed help with written reasoning/alternating attention task.  As language skills improve to functional level, focus will be primarily on higher level cognitive communication goals.

## 2017-02-23 LAB
GLUCOSE BLDC GLUCOMTR-MCNC: 146 MG/DL (ref 70–99)
GLUCOSE BLDC GLUCOMTR-MCNC: 154 MG/DL (ref 70–99)
GLUCOSE BLDC GLUCOMTR-MCNC: 164 MG/DL (ref 70–99)
GLUCOSE BLDC GLUCOMTR-MCNC: 173 MG/DL (ref 70–99)
GLUCOSE BLDC GLUCOMTR-MCNC: 191 MG/DL (ref 70–99)
INTERPRETATION ECG - MUSE: NORMAL

## 2017-02-23 PROCEDURE — A9270 NON-COVERED ITEM OR SERVICE: HCPCS | Mod: GY | Performed by: PHYSICAL MEDICINE & REHABILITATION

## 2017-02-23 PROCEDURE — 25000132 ZZH RX MED GY IP 250 OP 250 PS 637: Mod: GY | Performed by: PHYSICAL MEDICINE & REHABILITATION

## 2017-02-23 PROCEDURE — 40000133 ZZH STATISTIC OT WARD VISIT: Performed by: STUDENT IN AN ORGANIZED HEALTH CARE EDUCATION/TRAINING PROGRAM

## 2017-02-23 PROCEDURE — 97535 SELF CARE MNGMENT TRAINING: CPT | Mod: GO | Performed by: STUDENT IN AN ORGANIZED HEALTH CARE EDUCATION/TRAINING PROGRAM

## 2017-02-23 PROCEDURE — 00000146 ZZHCL STATISTIC GLUCOSE BY METER IP

## 2017-02-23 PROCEDURE — 25000125 ZZHC RX 250: Performed by: PHYSICAL MEDICINE & REHABILITATION

## 2017-02-23 PROCEDURE — 25000128 H RX IP 250 OP 636: Performed by: INTERNAL MEDICINE

## 2017-02-23 PROCEDURE — 97110 THERAPEUTIC EXERCISES: CPT | Mod: GO | Performed by: STUDENT IN AN ORGANIZED HEALTH CARE EDUCATION/TRAINING PROGRAM

## 2017-02-23 PROCEDURE — A9270 NON-COVERED ITEM OR SERVICE: HCPCS | Mod: GY | Performed by: INTERNAL MEDICINE

## 2017-02-23 PROCEDURE — 97532 ZZHC SP COGNITIVE SKILLS EA 15 MIN: CPT | Mod: GN | Performed by: SPEECH-LANGUAGE PATHOLOGIST

## 2017-02-23 PROCEDURE — 97530 THERAPEUTIC ACTIVITIES: CPT | Mod: GO | Performed by: STUDENT IN AN ORGANIZED HEALTH CARE EDUCATION/TRAINING PROGRAM

## 2017-02-23 PROCEDURE — 40000133 ZZH STATISTIC OT WARD VISIT: Performed by: OCCUPATIONAL THERAPIST

## 2017-02-23 PROCEDURE — 40000225 ZZH STATISTIC SLP WARD VISIT: Performed by: SPEECH-LANGUAGE PATHOLOGIST

## 2017-02-23 PROCEDURE — 25000132 ZZH RX MED GY IP 250 OP 250 PS 637: Mod: GY | Performed by: INTERNAL MEDICINE

## 2017-02-23 PROCEDURE — 97535 SELF CARE MNGMENT TRAINING: CPT | Mod: GO | Performed by: OCCUPATIONAL THERAPIST

## 2017-02-23 PROCEDURE — 97530 THERAPEUTIC ACTIVITIES: CPT | Mod: GP | Performed by: PHYSICAL THERAPIST

## 2017-02-23 PROCEDURE — 12800006 ZZH R&B REHAB

## 2017-02-23 PROCEDURE — 99211 OFF/OP EST MAY X REQ PHY/QHP: CPT

## 2017-02-23 PROCEDURE — 40000193 ZZH STATISTIC PT WARD VISIT: Performed by: PHYSICAL THERAPIST

## 2017-02-23 RX ORDER — RAMELTEON 8 MG/1
8 TABLET ORAL AT BEDTIME
Status: DISCONTINUED | OUTPATIENT
Start: 2017-02-23 | End: 2017-03-02

## 2017-02-23 RX ADMIN — CLOPIDOGREL BISULFATE 75 MG: 75 TABLET, FILM COATED ORAL at 08:03

## 2017-02-23 RX ADMIN — VITAMIN D, TAB 1000IU (100/BT) 1000 UNITS: 25 TAB at 08:03

## 2017-02-23 RX ADMIN — ATENOLOL 25 MG: 25 TABLET ORAL at 14:21

## 2017-02-23 RX ADMIN — ASPIRIN 325 MG ORAL TABLET 325 MG: 325 PILL ORAL at 08:02

## 2017-02-23 RX ADMIN — CEFTRIAXONE 2 G: 2 INJECTION, POWDER, FOR SOLUTION INTRAMUSCULAR; INTRAVENOUS at 01:11

## 2017-02-23 RX ADMIN — ATENOLOL 25 MG: 25 TABLET ORAL at 08:03

## 2017-02-23 RX ADMIN — SODIUM CHLORIDE, PRESERVATIVE FREE 10 ML: 5 INJECTION INTRAVENOUS at 20:04

## 2017-02-23 RX ADMIN — METFORMIN HYDROCHLORIDE 500 MG: 500 TABLET ORAL at 08:03

## 2017-02-23 RX ADMIN — OXYCODONE HYDROCHLORIDE AND ACETAMINOPHEN 500 MG: 500 TABLET ORAL at 08:03

## 2017-02-23 RX ADMIN — CEFTRIAXONE 2 G: 2 INJECTION, POWDER, FOR SOLUTION INTRAMUSCULAR; INTRAVENOUS at 13:20

## 2017-02-23 RX ADMIN — TRAMADOL HYDROCHLORIDE 50 MG: 50 TABLET, FILM COATED ORAL at 20:04

## 2017-02-23 RX ADMIN — Medication: at 20:05

## 2017-02-23 RX ADMIN — ATORVASTATIN CALCIUM 40 MG: 40 TABLET, FILM COATED ORAL at 08:03

## 2017-02-23 RX ADMIN — RAMELTEON 8 MG: 8 TABLET, FILM COATED ORAL at 20:04

## 2017-02-23 RX ADMIN — METFORMIN HYDROCHLORIDE 500 MG: 500 TABLET ORAL at 18:11

## 2017-02-23 RX ADMIN — ACETAMINOPHEN 1000 MG: 500 TABLET, FILM COATED ORAL at 08:02

## 2017-02-23 RX ADMIN — ACETAMINOPHEN 1000 MG: 500 TABLET, FILM COATED ORAL at 20:04

## 2017-02-23 RX ADMIN — INSULIN ASPART 1 UNITS: 100 INJECTION, SOLUTION INTRAVENOUS; SUBCUTANEOUS at 12:17

## 2017-02-23 RX ADMIN — ACETAMINOPHEN 1000 MG: 500 TABLET, FILM COATED ORAL at 14:03

## 2017-02-23 RX ADMIN — INSULIN ASPART 2 UNITS: 100 INJECTION, SOLUTION INTRAVENOUS; SUBCUTANEOUS at 08:07

## 2017-02-23 RX ADMIN — TAMSULOSIN HYDROCHLORIDE 0.8 MG: 0.4 CAPSULE ORAL at 20:04

## 2017-02-23 RX ADMIN — INSULIN ASPART 1 UNITS: 100 INJECTION, SOLUTION INTRAVENOUS; SUBCUTANEOUS at 18:11

## 2017-02-23 NOTE — PLAN OF CARE
Problem: Goal/Outcome  Goal: Goal Outcome Summary  Outcome: No Change  FOCUS/GOAL  Medication management, Pain management and Medical management     ASSESSMENT, INTERVENTIONS AND CONTINUING PLAN FOR GOAL:  Pt continent using urinal. LBM 2/22 with AM shift. Pt reported nighttime anxiety that occurs when he is unable to fall asleep. Notified the House Officer who placed a one time dose of seroquel. Educated pt on this medication and administered. Left sticky note for MD to add availability of med if nightly anxiety continues. Pt did c/o low back pain and was given tramadol x1 with some relief. Pt's PICC dressing was changed this shift. The biopatch was noted to be green around the insertion site. House officer notified and advised to follow-up with hospitalist team tomorrow 2/23. Pt is currently on IV antibiotics. Wrote sticky note to tell MDs about this situation. Nursing to follow-up. Continue with POC.

## 2017-02-23 NOTE — PLAN OF CARE
Problem: Goal/Outcome  Goal: Goal Outcome Summary  Up to manual w/c w/ solid back.  Back needed adjustments so not entirely great; however, he was OOB ~25 minutes w/ no change in BP or HR.

## 2017-02-23 NOTE — PLAN OF CARE
Problem: Goal/Outcome  Goal: Goal Outcome Summary  OT-Pt appears to be making progress towards goals.

## 2017-02-23 NOTE — PROGRESS NOTES
RN called about some discharge around picc line area. Patient not febrile at current. Vitals stable.   B/P: 144/66, T: 96.6, P: 85, R: 16  Temp (24hrs), Av.2  F (36.2  C), Min:96.6  F (35.9  C), Max:97.7  F (36.5  C)  Also issues w insomnia, anxiety.     - Local wound care around picc line.   - defer further management to primary team or medicine team in am.   - ordered seroquel once prn 12.5-25 mg for anxiety, insomnia. (recent EKG: Qtc 480)    Moonlighter.

## 2017-02-23 NOTE — PROGRESS NOTES
Found pill in pts bed when pt sat up w/ therapy.  Upon inspection and comparison to the pts current medication, it looked to be his atenolol.  Will consult with dr about re administering med.

## 2017-02-23 NOTE — PLAN OF CARE
Problem: Goal/Outcome  Goal: Goal Outcome Summary  pts family member  contact information for [employer] while they are [place] of the [place]   - [email or web address]   - [email or web address]

## 2017-02-23 NOTE — PLAN OF CARE
Problem: Goal/Outcome  Goal: Goal Outcome Summary  Outcome: No Change  FOCUS/GOAL  Medication management     ASSESSMENT, INTERVENTIONS AND CONTINUING PLAN FOR GOAL:  Pt A&O. HR still tachy, asymptomatic, though says he feels anxious at times. Fluids promoted, PICC saline locked. Denies SOB, numbness or tingling in all extremities and denies pain.  Pt able to make needs known and uses call light appropriately.  Will continue to monitor.

## 2017-02-23 NOTE — PROGRESS NOTES
Patient sleeping comfortably so did not wake him up.     D/W PMR, RN- no new concerns.  Missed Atenolol this am- advised to give it now.    - Ct current Mx

## 2017-02-23 NOTE — PROGRESS NOTES
"  [facility]   Acute Rehabilitation Unit  Daily progress note    interval history  Tad Manning was seen and examined at bedside. Two issues came up overnight. The patient was having some difficulty sleeping and requested medication for anxiety. He reports this happening for the past two or three nights. He has a history of occasional nighttime anxiety at home before this hospitalization as well. At home he infrequently takes Zolpidem as a sleep aid. Nurse also noted during PICC dressing change that biopatch was green around the insertion site and raised concern for possible infection. Issues to be discussed with hospitalist.    Patient feels well today, no complaints of dizziness or diaphoresis, although he reports nursing recorded a high HR this am. Plan is to repeat TTE for further eval. Ensure abd binder in place wit TEDs before getting OOB.    Therapies have been limited to some degree by medical issues, but making good progress with PT, OT, and SLP. Progressing to slide board transfers.     Patient's brother and sister-in-law will be leaving country on 3/4 and would like to have sit-down with providers on any date before that time.     Functionally,  \"OT-Pt appears to be making progress towards goals.\"    medications    metFORMIN  500 mg Oral BID w/meals     clopidogrel  75 mg Oral Daily     insulin glargine  20 Units Subcutaneous QAM AC     acetaminophen  1,000 mg Oral TID     miconazole   Topical BID     ascorbic acid (VITAMIN C) tablet 500 mg  500 mg Oral Daily     aspirin  325 mg Oral Daily     atenolol  25 mg Oral Daily     atorvastatin  40 mg Oral Daily     cefTRIAXone  2 g Intravenous Q12H     cholecalciferol  1,000 Units Oral Daily     tamsulosin  0.8 mg Oral At Bedtime     insulin aspart  1-7 Units Subcutaneous TID AC     insulin aspart  1-5 Units Subcutaneous At Bedtime     heparin lock flush  5-10 mL Intracatheter Q24H     tramadol, oxyCODONE, " "senna-docusate, polyethylene glycol, bisacodyl, - MEDICATION INSTRUCTIONS -, lidocaine, glucose **OR** dextrose **OR** glucagon, naloxone, sodium chloride (PF), heparin lock flush       physical exam  /78 (BP Location: Left arm)  Pulse 131  Temp 97.3  F (36.3  C) (Oral)  Resp 16  Ht 1.829 m (6')  Wt 86.5 kg (190 lb 12.8 oz)  SpO2 94%  BMI 25.88 kg/m2     Gen: NAD, resting in bed   HEENT: has glasses   Cardio: irregular rhythm, tachycardic  Pulm: clear breath sounds b/l   Abd: soft and non-tender   Ext: no edema in legs today, no tenderness in calves   Neuro/MSK: alert, diminished sensation in feet, strength improving in bilateral lower extremities - antigravity in all muscle groups     Skin: thoracic surgical incision with sutures in place and mild surrounding erythema - no drainage or tenderness to palpation      labs  , 181, 146, 191    assessment and plan  Tad Maninng is a 76 year old right hand dominant male with 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 Medical co-morbidities include post-op pain, anemia, hyponatremia, and h/o bacteremia on IV antibiotic. PMH significant for DM with peripheral neuropathy, HTN, HLD, CAD s/p CABG, systolic and diastolic heart failure, and CLL in remission.      Functional deficits are weakness in bilateral lower extremities, sensory loss in feet (due to diabetic neuropathy but worse likely due to SCI), neurogenic bladder and bowel, and acute on chronic deconditioning with poor endurance.         Admission to acute inpatient rehab 2/15.     Rehabilitation: please see interval history for updates. Ordered SLP on 2/16 given his low score on SLUMS 23/30.    SLP on 2/20: \"Completed Visual-Auditory Learning subtests of WJ-R. Pt scored standard score of 121, placing him in the 92nd percentile\".    Medical Conditions; appreciate hospitalist team assistance in medical " management.      # Incomplete SCI:  # Thoracic osteomyelitis/discitis with epidural abscess:  # S/p T9-10 laminectomy, T7-12 posterior arthrodesis and TF right sided T9-10 fusion.   # Strep mitis bacteremia:      --TLSO for comfort  --wound care per recs; will remove sutures at 2 weeks higinio ~ 2/26  --PICC line care on the right arm   -nurse reported green discharge 2/22  --pain management with prn tylenol, tramadol, and oxycodone   -2/19 tylenol was scheduled to help with pain and minimize narcotic use. Also tramadol was added to again minimize oxycodone intake due to side effects like urinary retention.    -2/22 requiring tramadol and oxycodone x1-2/day; will monitor for one more day and dc oxycodone   --continue IV rocephin for total of 6 weeks - end date 3/24  --should f/u with ID and neurosurgery teams as outpatient      ** Check CBC, CRP, creatinine, and LFTs weekly while on therapy and have results faxed to the [facility] at [phone]. - faxed on 2/21     Hyponatremia: resolved since 2/12 and stable ~ 131. 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 Repeat labs 2/20 Na 137.       Anemia: likely due to acute illness and blood loss; stable since his surgery. Will monitor clinically and repeat labs as needed.     Anxiety: patient reports history of occasional nighttime anxiety associated with inability to sleep prior to this admission. In the past, he has taken Zolpidem prn for this. Now complains of three consecutive nights of anxiety. Was given 12.5mg seroquel 2/22, patient says he thinks it helped.  --started scheduled ramelteon and prn seroquel on 2/23  --discussed with pt and ordered rehab psychology consult      # DM type II with neuropathy: on glipizide and victoza injections prior to admission. HgbA1C was 6.1% on 2/10. Currently on lantus 12 units daily and SSI. 2/17 lantus was increased to 14 for better control. 2/21 lantus increased to 20 daily and also was started  on metformin 500 bid.      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 Continue ASA; plavix restarted on 2/20.     # HTN: on amlodipine, atenolol, lisinopril and prinzide (?) prior to admission. Currently on atenolol 25 daily; BP has been variable - see below.    # Orthostatic hypotension: as confirmed by PT on 2/17. Multifactorial due to anemia, severe deconditioning and possibly autonomic instability due SCI. Should have TEDs and abdominal binder before getting out of bed. Ensure adequate hydration. Orthostatic precautions.       # Tachycardia with irregular rhythm: occurred during PT session on 2/21 while on tilt table; tachycardia is multifactorial as orthostatic hypotension. Spoke with Dr. Lange hospitalist, appreciated assistance. Repeat BMP wnl today; plan is to obtain cortisol level in am and continue to monitor him clinically for now.  Follow orthostatic precautions. Asymptomatic at this point. 2/22 repeat EKG with atrial flutter and variable block. Cardiology team was consulted; appreciate assistance. Recommended to continue current regimen and f/u as outpatient.    # HLD: on lipitor 40 daily. LDL was 48 in 5/2014 when last checked.      # CAD s/p CABG in 1995 with HFrEF (EF 40-45%): no active issues - continue ASA, lipitor and atenolol. Low threshold to repeat echo if any clinical s/s of heart failure.      # H/o CLL in remission: last chemo in 2015; is followed by oncology team - no active issues.        FEN: regular, mod consistent CHO.     Bowel: ? Neurogenic bowel - on prn bowel meds upon admission with last BM on 2/14. Will monitor and adjust meds/bowel regimen as needed. 2/17 scheduled bowel meds. 2/20 had loose BMs over the weekend so meds were changed to prn; started on metformin which can be contributing as well.     Bladder: ? Neurogenic bladder - h/o BPH on flomax prior to admission; was not resumed after admission to the  hospital likely due to low BP but restarted upon admission to ARU. PVRs elevated and was cathed once on 2/18. Plan is to minimize narcotic use, void in standing/sitting position as much as possible, and continue to monitor PVRs.     DVT Prophylaxis: mechanical; per neurosurgery team notes Ok to start chemical prophylaxis on 2/16 - will clarify. Discussed and started lovenox 40 daily on 2/17. 2/21 dc'd lovenox per hospitalist team recs as he was started on plavix.     GI Prophylaxis: none, oral intake.     Code: full.     Disposition: home vs TCU pending progress and family support. Will discuss in team rounds.     ELOS:  3-4 weeks.     Follow up Appointments on Discharge: ID, neurosurgery, PCP and PM&R. + cardiology    Follow-up with neurosurgery, Dr. Marcelo Trent MD in 2 weeks for wound check.       Follow-up with Infectious Disease, Dr. Gillian Vila, on 3/23/17. If you have not heard back regarding your appointment time please call [phone] and ask to page the Infectious Disease fellow regarding your pre-planned appointment with Dr. Vila on 3/23/17 to assess antibiotic regimen and progress    Pt was discussed with Chad Zavala, MS-3.    Hyun Koehler MD  Physical Medicine & Rehabilitation    I spent a total of 30 minutes face-to-face or managing the care of Tad Dumont Nigel. Over 50% of my time on the unit was spent counseling the patient and coordinating care. See note for details.

## 2017-02-23 NOTE — PROGRESS NOTES
[facility]  WOC Nurse Inpatient Skin Assessment     Follow up  Assessment of:   Coccyx      Data:   Patient History:     Per MD note(s):  76 year old male with CAD, DM with neuropathy, HTN, PVD with bilateral LE stents on ASA/plvx, Chronic lymphocytic leukemia in remission (last chemo in 2015) who presents after a fall on  and R > L leg weakness and inability to ambulate. His story is inconsistent and somewhat tangential but he clearly articulates that 72 hours prior he was able to ambulation but now he cannot. 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     [facility] was consulted to evaluate and treat a coccyx pressure injury. ( he was last assessesed by our service on 17 and found to have intact coccyx at that time)      Moisture Management:  Incontinence Protocol        Current Diet / Nutrition:         Active Diet Order      Combination Diet 4852-7056 Calories: Moderate Consistent CHO (4-6 CHO units/meal); Thin Liquids (water, ice chips, juice, milk, gelatin, ice cream, etc)            Other     Roberto Assessment and sub scores:   Roberto Score  Av.5  Min: 12  Max: 20       Labs:        Recent Labs   Lab Test  17   1559  17   0754   17   0835   02/10/17   0230   17   0740   ALBUMIN   --    --    --    --    --    --    --   2.7*   HGB   --   7.7*   < >  8.2*   < >  8.3*   < >  8.9*   INR   --    --    --   1.22*   --    --    < >   --    WBC   --   7.8   < >   --    < >  8.8   < >  6.7   A1C   --    --    --    --    --   6.1*   --   7.0*   CRP  127.0*   --    --    --    --   140.0*   < >   --     < > = values in this interval not displayed.       Skin Assessment (location):   Perianal region  History:  Incontinence; immobility      Skin: improving erythema and exfoliating epidermis in the immediate area encirciling buttocks  just above hilton cleft    Color: pink    Temperature  warm    Drainage:      Amount: none     Color: pink     Odor: none    Pain:  minimal, dull          Intervention:     Patient's chart evaluated.      Cares performed:    Orders  Written    Supplies  Reviewed    Discussed plan of care with Patient and Nurse          Assessment:   Moisture  associated dermatitis to perineum         Plan:   Nursing to notify the Provider(s) and re-consult the WO Nurse if skin deteriorate(s).  Skin care plan for Perinuem: Daily and as needde :  Cleanse with Mahsa Cleanse and Protect daily and with each episode of incontinence. Apply a thin layer of [employer] paste around rectum- Do Not Apply to Buttocks. Apply Sween 24 to dry, peeling skin on sacrum and buttocks. Apply twice daily.    WOC Nurse will return: weekly  Face to face time: 15 minutes

## 2017-02-23 NOTE — PLAN OF CARE
Problem: Goal/Outcome  Goal: Goal Outcome Summary  OT: Pt with limited tolerance for OOB activity.  Pt with elevated HR even at rest 136, /72.

## 2017-02-23 NOTE — PLAN OF CARE
Problem: Goal/Outcome  Goal: Goal Outcome Summary  Outcome: No Change  Alert and oriented. PVR following void was 125 at about 0135. Incontinent to BM, barrier cream applied to bottom. Denied pain. Using urinal independently. Pt slept between cares. Incision area to mid back CDI with sutures. BG at 0200 was 146, no coverage needed. Able to make his needs known, call light within reach.

## 2017-02-23 NOTE — PROGRESS NOTES
02/23/17 1722   Signing Clinician's Name / Credentials   Signing clinician's name / credentials Maria Isabel Pittman SLP Therapy Student   Quick Adds   Rehab Discipline SLP   Additional Documentation   SLP Plan SLP; continue with mod-complex reasoning/problem solving tasks, memory tasks, and expression tasks.  Finish game-based Yahtzee task (score sheet in folder) - assess retention of game rules/objectives. Try other game-based tasks (qwitch? ipad?)   Total Session Time   Total Session Time (minutes) 60 minutes   SLP - Acute Rehab Center Time   Individual Time (minutes) - enter zero if not applicable - SLP 60  (cognition )   Group Time (minutes) - enter zero if not applicable  - SLP 0   Concurrent Time (minutes) - enter zero if not applicable  - SLP 0   Co-Treatment Time (minutes) - enter zero if not applicable  - SLP 0   ARC Total Session Time (minutes) - SLP 60   Problem Solving (FIM)   Problem Solving Comment SLP; pt completed WJ-R analysis synthesis subtest, scored in 76%ile.  Pt engaged in game-based task (yahtzee) - pt demonstrated ability to use numerical reasoning (counting dice/scores), required min-moderate cues for attention (writing scores in correct area, checking to see what portions he had completed); Pt able to verbally express reasoning skills during task (i.e. selecting which dice to set aside and why).

## 2017-02-24 ENCOUNTER — APPOINTMENT (OUTPATIENT)
Dept: CARDIOLOGY | Facility: CLINIC | Age: 77
DRG: 949 | End: 2017-02-24
Attending: INTERNAL MEDICINE
Payer: MEDICARE

## 2017-02-24 LAB
ANION GAP SERPL CALCULATED.3IONS-SCNC: 8 MMOL/L (ref 3–14)
BUN SERPL-MCNC: 11 MG/DL (ref 7–30)
CALCIUM SERPL-MCNC: 7.9 MG/DL (ref 8.5–10.1)
CHLORIDE SERPL-SCNC: 105 MMOL/L (ref 94–109)
CO2 SERPL-SCNC: 26 MMOL/L (ref 20–32)
CREAT SERPL-MCNC: 0.54 MG/DL (ref 0.66–1.25)
CRP SERPL-MCNC: 27.6 MG/L (ref 0–8)
ERYTHROCYTE [DISTWIDTH] IN BLOOD BY AUTOMATED COUNT: 16.7 % (ref 10–15)
GFR SERPL CREATININE-BSD FRML MDRD: ABNORMAL ML/MIN/1.7M2
GLUCOSE BLDC GLUCOMTR-MCNC: 127 MG/DL (ref 70–99)
GLUCOSE BLDC GLUCOMTR-MCNC: 145 MG/DL (ref 70–99)
GLUCOSE BLDC GLUCOMTR-MCNC: 149 MG/DL (ref 70–99)
GLUCOSE BLDC GLUCOMTR-MCNC: 155 MG/DL (ref 70–99)
GLUCOSE SERPL-MCNC: 143 MG/DL (ref 70–99)
HCT VFR BLD AUTO: 24.2 % (ref 40–53)
HGB BLD-MCNC: 7.4 G/DL (ref 13.3–17.7)
MCH RBC QN AUTO: 27.4 PG (ref 26.5–33)
MCHC RBC AUTO-ENTMCNC: 30.6 G/DL (ref 31.5–36.5)
MCV RBC AUTO: 90 FL (ref 78–100)
PLATELET # BLD AUTO: 224 10E9/L (ref 150–450)
POTASSIUM SERPL-SCNC: 3.4 MMOL/L (ref 3.4–5.3)
RBC # BLD AUTO: 2.7 10E12/L (ref 4.4–5.9)
SODIUM SERPL-SCNC: 139 MMOL/L (ref 133–144)
WBC # BLD AUTO: 5.6 10E9/L (ref 4–11)

## 2017-02-24 PROCEDURE — 25000132 ZZH RX MED GY IP 250 OP 250 PS 637: Mod: GY | Performed by: INTERNAL MEDICINE

## 2017-02-24 PROCEDURE — 99233 SBSQ HOSP IP/OBS HIGH 50: CPT | Performed by: INTERNAL MEDICINE

## 2017-02-24 PROCEDURE — 93325 DOPPLER ECHO COLOR FLOW MAPG: CPT | Mod: 26 | Performed by: INTERNAL MEDICINE

## 2017-02-24 PROCEDURE — 97110 THERAPEUTIC EXERCISES: CPT | Mod: GP

## 2017-02-24 PROCEDURE — 86140 C-REACTIVE PROTEIN: CPT | Performed by: INTERNAL MEDICINE

## 2017-02-24 PROCEDURE — 80048 BASIC METABOLIC PNL TOTAL CA: CPT | Performed by: INTERNAL MEDICINE

## 2017-02-24 PROCEDURE — 97535 SELF CARE MNGMENT TRAINING: CPT | Mod: GO

## 2017-02-24 PROCEDURE — 36592 COLLECT BLOOD FROM PICC: CPT | Performed by: INTERNAL MEDICINE

## 2017-02-24 PROCEDURE — A9270 NON-COVERED ITEM OR SERVICE: HCPCS | Mod: GY | Performed by: PHYSICAL MEDICINE & REHABILITATION

## 2017-02-24 PROCEDURE — 97530 THERAPEUTIC ACTIVITIES: CPT | Mod: GP

## 2017-02-24 PROCEDURE — 85027 COMPLETE CBC AUTOMATED: CPT | Performed by: INTERNAL MEDICINE

## 2017-02-24 PROCEDURE — A9270 NON-COVERED ITEM OR SERVICE: HCPCS | Mod: GY | Performed by: INTERNAL MEDICINE

## 2017-02-24 PROCEDURE — 93308 TTE F-UP OR LMTD: CPT | Mod: 26 | Performed by: INTERNAL MEDICINE

## 2017-02-24 PROCEDURE — 12800006 ZZH R&B REHAB

## 2017-02-24 PROCEDURE — 97530 THERAPEUTIC ACTIVITIES: CPT | Mod: GO

## 2017-02-24 PROCEDURE — 93321 DOPPLER ECHO F-UP/LMTD STD: CPT | Mod: 26 | Performed by: INTERNAL MEDICINE

## 2017-02-24 PROCEDURE — 25000125 ZZHC RX 250: Performed by: PHYSICAL MEDICINE & REHABILITATION

## 2017-02-24 PROCEDURE — 00000146 ZZHCL STATISTIC GLUCOSE BY METER IP

## 2017-02-24 PROCEDURE — 97532 ZZHC SP COGNITIVE SKILLS EA 15 MIN: CPT | Mod: GN | Performed by: SPEECH-LANGUAGE PATHOLOGIST

## 2017-02-24 PROCEDURE — 40000133 ZZH STATISTIC OT WARD VISIT

## 2017-02-24 PROCEDURE — 25000132 ZZH RX MED GY IP 250 OP 250 PS 637: Mod: GY | Performed by: PHYSICAL MEDICINE & REHABILITATION

## 2017-02-24 PROCEDURE — 93308 TTE F-UP OR LMTD: CPT

## 2017-02-24 PROCEDURE — 93005 ELECTROCARDIOGRAM TRACING: CPT

## 2017-02-24 PROCEDURE — 40000193 ZZH STATISTIC PT WARD VISIT

## 2017-02-24 PROCEDURE — 25500064 ZZH RX 255 OP 636: Performed by: INTERNAL MEDICINE

## 2017-02-24 PROCEDURE — 25000128 H RX IP 250 OP 636: Performed by: INTERNAL MEDICINE

## 2017-02-24 PROCEDURE — 40000225 ZZH STATISTIC SLP WARD VISIT: Performed by: SPEECH-LANGUAGE PATHOLOGIST

## 2017-02-24 PROCEDURE — 40000264 ECHO LIMITED WITH DEFINITY

## 2017-02-24 RX ADMIN — ACETAMINOPHEN 1000 MG: 500 TABLET, FILM COATED ORAL at 08:04

## 2017-02-24 RX ADMIN — INSULIN ASPART 1 UNITS: 100 INJECTION, SOLUTION INTRAVENOUS; SUBCUTANEOUS at 18:18

## 2017-02-24 RX ADMIN — ACETAMINOPHEN 1000 MG: 500 TABLET, FILM COATED ORAL at 13:55

## 2017-02-24 RX ADMIN — INSULIN ASPART 1 UNITS: 100 INJECTION, SOLUTION INTRAVENOUS; SUBCUTANEOUS at 13:54

## 2017-02-24 RX ADMIN — SODIUM CHLORIDE, PRESERVATIVE FREE 5 ML: 5 INJECTION INTRAVENOUS at 14:45

## 2017-02-24 RX ADMIN — TRAMADOL HYDROCHLORIDE 50 MG: 50 TABLET, FILM COATED ORAL at 08:06

## 2017-02-24 RX ADMIN — SODIUM CHLORIDE, PRESERVATIVE FREE 5 ML: 5 INJECTION INTRAVENOUS at 21:36

## 2017-02-24 RX ADMIN — ATENOLOL 25 MG: 25 TABLET ORAL at 08:05

## 2017-02-24 RX ADMIN — ACETAMINOPHEN 1000 MG: 500 TABLET, FILM COATED ORAL at 21:37

## 2017-02-24 RX ADMIN — PERFLUTREN 5 ML: 6.52 INJECTION, SUSPENSION INTRAVENOUS at 12:45

## 2017-02-24 RX ADMIN — RAMELTEON 8 MG: 8 TABLET, FILM COATED ORAL at 21:37

## 2017-02-24 RX ADMIN — INSULIN ASPART 1 UNITS: 100 INJECTION, SOLUTION INTRAVENOUS; SUBCUTANEOUS at 08:06

## 2017-02-24 RX ADMIN — METFORMIN HYDROCHLORIDE 500 MG: 500 TABLET ORAL at 08:04

## 2017-02-24 RX ADMIN — METOPROLOL TARTRATE 37.5 MG: 25 TABLET, FILM COATED ORAL at 21:37

## 2017-02-24 RX ADMIN — TAMSULOSIN HYDROCHLORIDE 0.8 MG: 0.4 CAPSULE ORAL at 21:36

## 2017-02-24 RX ADMIN — Medication 2.5 MG: at 02:50

## 2017-02-24 RX ADMIN — CLOPIDOGREL BISULFATE 75 MG: 75 TABLET, FILM COATED ORAL at 08:05

## 2017-02-24 RX ADMIN — ATORVASTATIN CALCIUM 40 MG: 40 TABLET, FILM COATED ORAL at 08:05

## 2017-02-24 RX ADMIN — CEFTRIAXONE 2 G: 2 INJECTION, POWDER, FOR SOLUTION INTRAMUSCULAR; INTRAVENOUS at 01:13

## 2017-02-24 RX ADMIN — METFORMIN HYDROCHLORIDE 500 MG: 500 TABLET ORAL at 18:17

## 2017-02-24 RX ADMIN — CEFTRIAXONE 2 G: 2 INJECTION, POWDER, FOR SOLUTION INTRAMUSCULAR; INTRAVENOUS at 13:55

## 2017-02-24 RX ADMIN — ASPIRIN 325 MG ORAL TABLET 325 MG: 325 PILL ORAL at 08:05

## 2017-02-24 RX ADMIN — Medication 12.5 MG: at 22:16

## 2017-02-24 RX ADMIN — VITAMIN D, TAB 1000IU (100/BT) 1000 UNITS: 25 TAB at 08:05

## 2017-02-24 RX ADMIN — SODIUM CHLORIDE, PRESERVATIVE FREE 5 ML: 5 INJECTION INTRAVENOUS at 05:43

## 2017-02-24 RX ADMIN — OXYCODONE HYDROCHLORIDE AND ACETAMINOPHEN 500 MG: 500 TABLET ORAL at 08:04

## 2017-02-24 NOTE — PLAN OF CARE
Problem: Goal/Outcome  Goal: Goal Outcome Summary  Outcome: Improving  SLP: pt demonstrating improvement with complex reasoning and flexible attention.  Needed only min assist with numerical reasoning task, as well as activity requiring planning/sequencing and being able to identify errors and correct.

## 2017-02-24 NOTE — PLAN OF CARE
Problem: Goal/Outcome  Goal: Goal Outcome Summary  Outcome: No Change  FOCUS/GOAL  Bowel management, Bladder management, Medication management, Pain management and Medical management     ASSESSMENT, INTERVENTIONS AND CONTINUING PLAN FOR GOAL:  Pt has been continent of bladder. Pt also had BM this shift. Hospitalist ordered ECHO and EKG for pt today. He also changed pt's atenolol to metoprolol. Educated pt on medication changes. Also discussed PLC PICC/IV antibiotics and diabetic educator classes with pt. Classes ordered, but need to be scheduled. C/o low back pain and was given tramadol x1 with some relief. Pt's glasses broke on one side. Screw was found in bed with pt and taped to broken arm of glasses. Continue with POC.

## 2017-02-24 NOTE — PROGRESS NOTES
[facility]   Acute Rehabilitation Unit  Daily progress note    interval history  Tad Manning was seen and examined at bedside. Had difficulty falling asleep last night after scheduled ramelteon was given - no seroquel was used and an order for ambien was received from house officer. He slept for 2.5 hours after ambien was given. Spoke with nursing staff; dc'd ambien and will continue prn seroquel.     Tachycardia continues and TTE showed AF with worsening of his EF (down to 25%). Spoke with Dr. Lange hospitalist, atenolol was changed to metoprolol and will be titrated as needed for rate control.     dc'd oxycodone and miconazole cream.     PT: UB work station with emphasis on isometric core stability with BUE movements against resistance. Pt completed 10-15 reps of rows, chest press, lat pull downs, bicep curls, and tricep extension. He also completes oblique punches (theraband providing lateral resistance and pt punches with BUE out straight for isometric oblique control)    time time trying to locate platfrom commode to initate BSC transfer but unsucessful. Completed bed <> w/c transfer with use of slide board, pt requires cues for sequenceing steps and assist with set up of board and set up of w/c.     SLP: pt completed WJ-R analysis synthesis subtest, scored in 76%ile. Pt engaged in game-based task (guszee) - pt demonstrated ability to use numerical reasoning (counting dice/scores), required min-moderate cues for attention (writing scores in correct area, checking to see what portions he had completed); Pt able to verbally express reasoning skills during task (i.e. selecting which dice to set aside and why).       medications    metoprolol  37.5 mg Oral BID     ramelteon  8 mg Oral At Bedtime     metFORMIN  500 mg Oral BID w/meals     clopidogrel  75 mg Oral Daily     insulin glargine  20 Units Subcutaneous QAM AC     acetaminophen  1,000 mg Oral TID     ascorbic  acid (VITAMIN C) tablet 500 mg  500 mg Oral Daily     aspirin  325 mg Oral Daily     atorvastatin  40 mg Oral Daily     cefTRIAXone  2 g Intravenous Q12H     cholecalciferol  1,000 Units Oral Daily     tamsulosin  0.8 mg Oral At Bedtime     insulin aspart  1-7 Units Subcutaneous TID AC     insulin aspart  1-5 Units Subcutaneous At Bedtime     heparin lock flush  5-10 mL Intracatheter Q24H     QUEtiapine, tramadol, senna-docusate, polyethylene glycol, bisacodyl, - MEDICATION INSTRUCTIONS -, lidocaine, glucose **OR** dextrose **OR** glucagon, naloxone, sodium chloride (PF), heparin lock flush       physical exam  /66 (BP Location: Left arm)  Pulse 95  Temp 96.2  F (35.7  C) (Oral)  Resp 18  Ht 1.829 m (6')  Wt 86.5 kg (190 lb 12.8 oz)  SpO2 97%  BMI 25.88 kg/m2     Gen: NAD, resting in bed   HEENT: has glasses   Cardio: irregular rhythm, tachycardic  Pulm: clear breath sounds b/l   Abd: soft and non-tender   Ext: no edema in legs, no tenderness in calves   Neuro/MSK: alert and oriented, good recall of previous conversations, strength 3/5 HF, 4-/5 KE/KF, 4/5 DF, EHL and PF b/l, paresthesia in feet b/l     Skin: groins with no rashes   thoracic surgical incision with sutures in place and mild surrounding erythema - no drainage or tenderness to palpation      labs  BG in good range     assessment and plan  Tad Manning is a 76 year old right hand dominant male with 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 Medical co-morbidities include post-op pain, anemia, hyponatremia, and h/o bacteremia on IV antibiotic. PMH significant for DM with peripheral neuropathy, HTN, HLD, CAD s/p CABG, systolic and diastolic heart failure, and CLL in remission.      Functional deficits are weakness in bilateral lower extremities, sensory loss in feet (due to diabetic neuropathy but worse likely due to SCI), neurogenic bladder and  bowel, and acute on chronic deconditioning with poor endurance.         Admission to acute inpatient rehab 2/15.     Rehabilitation: please see interval history for updates. Ordered SLP on 2/16 given his low score on SLUMS 23/30.      Medical Conditions; appreciate hospitalist team assistance in medical management.      # Incomplete SCI:  # Thoracic osteomyelitis/discitis with epidural abscess:  # S/p T9-10 laminectomy, T7-12 posterior arthrodesis and TF right sided T9-10 fusion.   # Strep mitis bacteremia:      --TLSO for comfort  --wound care per recs; will remove sutures at 2 weeks higinio ~ 2/26  --PICC line care on the right arm   -nurse reported green discharge 2/22, no clinical s/s of infection - will monitor   --pain management with prn tylenol, tramadol, and oxycodone   -2/19 tylenol was scheduled to help with pain and minimize narcotic use. Also tramadol was added to again minimize oxycodone intake due to side effects like urinary retention.    -2/22 requiring tramadol and oxycodone x1-2/day; will monitor for one more day and dc oxycodone. 2/24 dc'd oxy.    --continue IV rocephin for total of 6 weeks - end date 3/24  --should f/u with ID and neurosurgery teams as outpatient      ** Check CBC, CRP, creatinine, and LFTs weekly while on therapy and have results faxed to the [facility] at [phone]. - faxed on 2/21     Hyponatremia: resolved since 2/12 and stable ~ 131. 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 Repeat labs 2/20 Na 137.       Anemia: likely due to acute illness and blood loss; stable since his surgery. Will monitor clinically and repeat labs as needed.     Anxiety: patient reports history of occasional nighttime anxiety associated with inability to sleep prior to this admission. In the past, he has taken Zolpidem prn for this. Now complains of three consecutive nights of anxiety. Was given 12.5mg seroquel 2/22, patient says he thinks it helped.  --started  scheduled ramelteon and prn seroquel on 2/23  --discussed with pt and ordered rehab psychology consult      # DM type II with neuropathy: on glipizide and victoza injections prior to admission. HgbA1C was 6.1% on 2/10. Currently on lantus 12 units daily and SSI. 2/17 lantus was increased to 14 for better control. 2/21 lantus increased to 20 daily and also was started on metformin 500 bid.      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 Continue ASA; plavix restarted on 2/20.     # HTN: on amlodipine, atenolol, lisinopril and prinzide (?) prior to admission. Currently on atenolol 25 daily; BP has been variable with new arrhythmia - see below.    # Orthostatic hypotension: as confirmed by PT on 2/17. Multifactorial due to anemia, severe deconditioning and possibly autonomic instability due SCI. Should have TEDs and abdominal binder before getting out of bed. Ensure adequate hydration. Orthostatic precautions.       # Tachycardia with irregular rhythm: occurred during PT session on 2/21 while on tilt table; tachycardia is multifactorial as orthostatic hypotension. Spoke with Dr. Lange hospitalist, appreciated assistance. Repeat BMP wnl today; plan is to obtain cortisol level in am and continue to monitor him clinically for now.  Follow orthostatic precautions. Asymptomatic at this point. 2/22 repeat EKG with atrial flutter and variable block. Cardiology team was consulted; appreciate assistance. Recommended to continue current regimen and f/u as outpatient. 2/24 repeat TTE showed AFib and reduced EF; atenolol was changed to metoprolol for better rate control.     # HLD: on lipitor 40 daily. LDL was 48 in 5/2014 when last checked.      # CAD s/p CABG in 1995 with HFrEF (EF 40-45%): no active issues - continue ASA, lipitor and atenolol. Low threshold to repeat echo if any clinical s/s of heart failure. 2/24 repeat TTE showed worsening of his cardiac  function.      # H/o CLL in remission: last chemo in 2015; is followed by oncology team - no active issues.        FEN: regular, mod consistent CHO.     Bowel: ? Neurogenic bowel - on prn bowel meds upon admission with last BM on 2/14. Will monitor and adjust meds/bowel regimen as needed. 2/17 scheduled bowel meds. 2/20 had loose BMs over the weekend so meds were changed to prn; started on metformin which can be contributing as well.     Bladder: ? Neurogenic bladder - h/o BPH on flomax prior to admission; was not resumed after admission to the hospital likely due to low BP but restarted upon admission to ARU. PVRs elevated and was cathed once on 2/18. Plan is to minimize narcotic use, void in standing/sitting position as much as possible, and continue to monitor PVRs.     DVT Prophylaxis: mechanical; per neurosurgery team notes Ok to start chemical prophylaxis on 2/16 - will clarify. Discussed and started lovenox 40 daily on 2/17. 2/21 dc'd lovenox per hospitalist team recs as he was started on plavix.     GI Prophylaxis: none, oral intake.     Code: full.     Disposition: home vs TCU pending progress and family support. Will discuss in team rounds.     ELOS:  3-4 weeks.     Follow up Appointments on Discharge: ID, neurosurgery, PCP and PM&R. + cardiology    Follow-up with neurosurgery, Dr. Marcelo Trent MD in 2 weeks for wound check.       Follow-up with Infectious Disease, Dr. Gillian Vila, on 3/23/17. If you have not heard back regarding your appointment time please call [phone] and ask to page the Infectious Disease fellow regarding your pre-planned appointment with Dr. Vila on 3/23/17 to assess antibiotic regimen and progress    Hyun Koehler MD  Physical Medicine & Rehabilitation    I spent a total of 25 minutes face-to-face or managing the care of Tad Tim Manning. Over 50% of my time on the unit was spent counseling the patient and coordinating care. See note for details.

## 2017-02-24 NOTE — PLAN OF CARE
Problem: Goal/Outcome  Goal: Goal Outcome Summary  Outcome: No Change  Pt denied pain. Pt c/o not being unable sleep tonight, Ambien was ordered and given at 0250, which was effective. PICC patent. Incision area on mid back is CDI. Alert and able to make his needs known, will continue with plan of care.

## 2017-02-24 NOTE — OP NOTE
DATE OF OPERATION:  02/09/2017      PREOPERATIVE DIAGNOSIS:  Epidural abscess, osteomyelitis.      POSTOPERATIVE DIAGNOSIS:  Epidural abscess, osteomyelitis.      PROCEDURES PERFORMED:   1.  T9-T10 laminectomy.   2.  T9 transpedicular left-sided transpedicular disk space abscess evacuation.      SURGEON:  Marcelo Trent MD.      :  Ileana Floyd MD      INDICATION FOR PROCEDURE:  Mr. Tad Manning is a 76-year-old male with history of coronary artery disease, diabetes, hypertension, peripheral vascular disease, on aspirin and Plavix, who has had progressive lower extremity weakness from a few days prior.  He now reports that he is minimally to move his legs.  An MRI was done which showed severe T9-10 cord impingement from epidural abscess.  For these reasons, he was brought to the operating room for evacuation.      DESCRIPTION OF PROCEDURE:  Mr. Manning was brought to the operating room, general endotracheal anesthesia was induced.  He was positioned prone on the Eder frame and all pressure points were padded and checked.  The back was prepped and draped in the usual sterile fashion.  Localizing x-ray was taken with C-arm.  The skin was incised with #10 blade.  Monopolar cautery was used to dissect down to the lamina what was thought to be T9-10.  Another x-ray was taken at this point Anspach drill was used to drill lateral to the spinous processes and the Leksell rongeur was used to remove as much bone as possible.  Once the ligamentum flavum was identified, this was opened upgoing curets and 2 and 3 Kerrison punches were used to resect this.  After this, the dura was identified.  There was pam pus, which was visible and cultures were sent from this.  At this point, the left side of the T10 pedicle was partially resected with the Anspach and we were able to follow this down to the disk space between T9-T10.  We irrigated in the disk space and aspirated pus using a spinal needle.  The wound  was irrigated with antibiotic and saline.  Subfascial Hemovac drain was left.  The fascia was closed with 0 Vicryl, subcutaneous layers with 2-0 Vicryl and skin with 3-0 nylon.  A sterile dressing was applied.  The patient was turned supine and extubated in stable neurological condition.  All counts were correct.      Dr. Oswald was immediately available for the entire procedure and present for all critical portions.         BART OSWALD MD       As dictated by THAIS LOERA MD            D: 2017 23:02   T: 2017 23:50   MT:       Name:     PETER BUTLER   MRN:      [id number]        Account:        [id number]   :      1940           Procedure Date: 2017      Document: [id number]

## 2017-02-24 NOTE — PLAN OF CARE
Problem: Goal/Outcome  Goal: Goal Outcome Summary  FOCUS/GOAL  Pain management and Skin integrity     ASSESSMENT, INTERVENTIONS AND CONTINUING PLAN FOR GOAL:  Pt alert and oriented, denies pain, during transfers moaned and grimaced. Back incision ALISHA. Assist of 2 with lift and to boost in bed. Incontinent at times, also uses urinal independently. Pt asking about Rozerem, received first dose tonight. Requested PRN ultram at bedtime. Both lumens to PICC heparin locked. Sween applied to buttocks, miconazole to groin, and barrier to perineal area. Will continue to monitor.

## 2017-02-25 LAB
ABO + RH BLD: NORMAL
ABO + RH BLD: NORMAL
BLD GP AB SCN SERPL QL: NORMAL
BLD PROD TYP BPU: NORMAL
BLD PROD TYP BPU: NORMAL
BLD UNIT ID BPU: 0
BLOOD BANK CMNT PATIENT-IMP: NORMAL
BLOOD PRODUCT CODE: NORMAL
BPU ID: NORMAL
GLUCOSE BLDC GLUCOMTR-MCNC: 114 MG/DL (ref 70–99)
GLUCOSE BLDC GLUCOMTR-MCNC: 129 MG/DL (ref 70–99)
GLUCOSE BLDC GLUCOMTR-MCNC: 133 MG/DL (ref 70–99)
GLUCOSE BLDC GLUCOMTR-MCNC: 137 MG/DL (ref 70–99)
GLUCOSE BLDC GLUCOMTR-MCNC: 97 MG/DL (ref 70–99)
GLUCOSE BLDC GLUCOMTR-MCNC: 98 MG/DL (ref 70–99)
HGB BLD-MCNC: 7.8 G/DL (ref 13.3–17.7)
NUM BPU REQUESTED: 1
SPECIMEN EXP DATE BLD: NORMAL
TRANSFUSION STATUS PATIENT QL: NORMAL
TRANSFUSION STATUS PATIENT QL: NORMAL

## 2017-02-25 PROCEDURE — 85018 HEMOGLOBIN: CPT | Performed by: INTERNAL MEDICINE

## 2017-02-25 PROCEDURE — 36592 COLLECT BLOOD FROM PICC: CPT | Performed by: INTERNAL MEDICINE

## 2017-02-25 PROCEDURE — 97530 THERAPEUTIC ACTIVITIES: CPT | Mod: GP

## 2017-02-25 PROCEDURE — A9270 NON-COVERED ITEM OR SERVICE: HCPCS | Mod: GY | Performed by: INTERNAL MEDICINE

## 2017-02-25 PROCEDURE — 25000132 ZZH RX MED GY IP 250 OP 250 PS 637: Mod: GY | Performed by: INTERNAL MEDICINE

## 2017-02-25 PROCEDURE — 99233 SBSQ HOSP IP/OBS HIGH 50: CPT | Performed by: INTERNAL MEDICINE

## 2017-02-25 PROCEDURE — 40000133 ZZH STATISTIC OT WARD VISIT

## 2017-02-25 PROCEDURE — A9270 NON-COVERED ITEM OR SERVICE: HCPCS | Mod: GY | Performed by: PHYSICAL MEDICINE & REHABILITATION

## 2017-02-25 PROCEDURE — 93005 ELECTROCARDIOGRAM TRACING: CPT

## 2017-02-25 PROCEDURE — 25000132 ZZH RX MED GY IP 250 OP 250 PS 637: Mod: GY | Performed by: PHYSICAL MEDICINE & REHABILITATION

## 2017-02-25 PROCEDURE — 86923 COMPATIBILITY TEST ELECTRIC: CPT | Performed by: INTERNAL MEDICINE

## 2017-02-25 PROCEDURE — P9016 RBC LEUKOCYTES REDUCED: HCPCS | Performed by: INTERNAL MEDICINE

## 2017-02-25 PROCEDURE — 86900 BLOOD TYPING SEROLOGIC ABO: CPT | Performed by: INTERNAL MEDICINE

## 2017-02-25 PROCEDURE — 12800006 ZZH R&B REHAB

## 2017-02-25 PROCEDURE — 25000128 H RX IP 250 OP 636

## 2017-02-25 PROCEDURE — 86850 RBC ANTIBODY SCREEN: CPT | Performed by: INTERNAL MEDICINE

## 2017-02-25 PROCEDURE — 25000125 ZZHC RX 250: Performed by: PHYSICAL MEDICINE & REHABILITATION

## 2017-02-25 PROCEDURE — 25000128 H RX IP 250 OP 636: Performed by: INTERNAL MEDICINE

## 2017-02-25 PROCEDURE — 86901 BLOOD TYPING SEROLOGIC RH(D): CPT | Performed by: INTERNAL MEDICINE

## 2017-02-25 PROCEDURE — 97532 ZZHC SP COGNITIVE SKILLS EA 15 MIN: CPT | Mod: GN | Performed by: SPEECH-LANGUAGE PATHOLOGIST

## 2017-02-25 PROCEDURE — 00000146 ZZHCL STATISTIC GLUCOSE BY METER IP

## 2017-02-25 PROCEDURE — 97110 THERAPEUTIC EXERCISES: CPT | Mod: GP

## 2017-02-25 PROCEDURE — 40000193 ZZH STATISTIC PT WARD VISIT

## 2017-02-25 PROCEDURE — 97535 SELF CARE MNGMENT TRAINING: CPT | Mod: GO

## 2017-02-25 PROCEDURE — 40000225 ZZH STATISTIC SLP WARD VISIT: Performed by: SPEECH-LANGUAGE PATHOLOGIST

## 2017-02-25 RX ORDER — DIPHENHYDRAMINE HYDROCHLORIDE 50 MG/ML
25 INJECTION INTRAMUSCULAR; INTRAVENOUS EVERY 6 HOURS PRN
Status: DISCONTINUED | OUTPATIENT
Start: 2017-02-25 | End: 2017-03-19 | Stop reason: HOSPADM

## 2017-02-25 RX ORDER — SODIUM CHLORIDE 9 MG/ML
INJECTION, SOLUTION INTRAVENOUS
Status: COMPLETED
Start: 2017-02-25 | End: 2017-02-25

## 2017-02-25 RX ORDER — DIPHENHYDRAMINE HCL 25 MG
25 CAPSULE ORAL EVERY 6 HOURS PRN
Status: DISCONTINUED | OUTPATIENT
Start: 2017-02-25 | End: 2017-03-19 | Stop reason: HOSPADM

## 2017-02-25 RX ORDER — METOPROLOL TARTRATE 50 MG
50 TABLET ORAL 2 TIMES DAILY
Status: DISCONTINUED | OUTPATIENT
Start: 2017-02-25 | End: 2017-03-18

## 2017-02-25 RX ADMIN — OXYCODONE HYDROCHLORIDE AND ACETAMINOPHEN 500 MG: 500 TABLET ORAL at 08:35

## 2017-02-25 RX ADMIN — CEFTRIAXONE 2 G: 2 INJECTION, POWDER, FOR SOLUTION INTRAMUSCULAR; INTRAVENOUS at 12:04

## 2017-02-25 RX ADMIN — SODIUM CHLORIDE, PRESERVATIVE FREE 20 ML: 5 INJECTION INTRAVENOUS at 13:54

## 2017-02-25 RX ADMIN — RAMELTEON 8 MG: 8 TABLET, FILM COATED ORAL at 20:30

## 2017-02-25 RX ADMIN — ACETAMINOPHEN 1000 MG: 500 TABLET, FILM COATED ORAL at 13:29

## 2017-02-25 RX ADMIN — VITAMIN D, TAB 1000IU (100/BT) 1000 UNITS: 25 TAB at 08:35

## 2017-02-25 RX ADMIN — METFORMIN HYDROCHLORIDE 500 MG: 500 TABLET ORAL at 17:47

## 2017-02-25 RX ADMIN — DIPHENHYDRAMINE HYDROCHLORIDE 25 MG: 25 CAPSULE ORAL at 17:46

## 2017-02-25 RX ADMIN — CLOPIDOGREL BISULFATE 75 MG: 75 TABLET, FILM COATED ORAL at 08:34

## 2017-02-25 RX ADMIN — METOPROLOL TARTRATE 50 MG: 50 TABLET, FILM COATED ORAL at 20:30

## 2017-02-25 RX ADMIN — ACETAMINOPHEN 1000 MG: 500 TABLET, FILM COATED ORAL at 08:34

## 2017-02-25 RX ADMIN — ATORVASTATIN CALCIUM 40 MG: 40 TABLET, FILM COATED ORAL at 08:35

## 2017-02-25 RX ADMIN — TAMSULOSIN HYDROCHLORIDE 0.8 MG: 0.4 CAPSULE ORAL at 20:29

## 2017-02-25 RX ADMIN — ACETAMINOPHEN 1000 MG: 500 TABLET, FILM COATED ORAL at 20:29

## 2017-02-25 RX ADMIN — METOPROLOL TARTRATE 37.5 MG: 25 TABLET, FILM COATED ORAL at 08:31

## 2017-02-25 RX ADMIN — ASPIRIN 325 MG ORAL TABLET 325 MG: 325 PILL ORAL at 08:34

## 2017-02-25 RX ADMIN — SODIUM CHLORIDE, PRESERVATIVE FREE 10 ML: 5 INJECTION INTRAVENOUS at 22:11

## 2017-02-25 RX ADMIN — SODIUM CHLORIDE, PRESERVATIVE FREE 5 ML: 5 INJECTION INTRAVENOUS at 13:55

## 2017-02-25 RX ADMIN — METFORMIN HYDROCHLORIDE 500 MG: 500 TABLET ORAL at 08:34

## 2017-02-25 RX ADMIN — CEFTRIAXONE 2 G: 2 INJECTION, POWDER, FOR SOLUTION INTRAMUSCULAR; INTRAVENOUS at 01:00

## 2017-02-25 RX ADMIN — SODIUM CHLORIDE, PRESERVATIVE FREE 5 ML: 5 INJECTION INTRAVENOUS at 01:46

## 2017-02-25 NOTE — PROGRESS NOTES
I spoke with by Dr. Lange over the phone regarding Mr. Manning.  Cardiology was consulted 2 days ago for new atrial flutter. He had been admitted for spine osteomyelitis s/p surgery and currently in acute rehab. He has had postural dizziness and orthostatic tachycardia and at times orthostatic hypotension. Currently,he seems to have mainly orthostatic tachycardia or tachycardia with activity with rates occasionally going as high as 160s. HR 90s at rest  He has hx of CABG in 1995, DM, PVD s/p iliac artery stents and bypass. Echo on 1/20 showed EF 40%, mild global hypokinesis and moderate severe inferolateral hypokinesis. Repeat echo yesterday showed EF 25% severly dilated LV, severe global hypokinesis.  His atenolol was switched to metoprolol 37.5mg BID yesterday      1. Atrial flutter  - Recommend increasing metoprolol to 50mg BID. His BP has been good. Metoprolol can be uptitrated gradually if needed. Goal HR<110 with activity if possible. He likely has ongoing sympathetic drive from his infection (although inflammatory markers are coming down) and possibly anemia. Please see previous note regarding plan for anticoagulation    2. Cardiomyopathy  -suspect EF is worse b/c tachycardia mediated from aflutter. Other possibility is stress induced. Less likely ischemic.   - recommend starting Lisinopril 2.5mg once daily.  -recommend follow up in cardiology in one month with repeat echo and 24-48 hr holter to assess EF and rate control respectively.     Antoine Conroy  General Cardiology Fellow  Pager [phone]

## 2017-02-25 NOTE — PLAN OF CARE
Problem: Goal/Outcome  Goal: Goal Outcome Summary  FOCUS/GOAL  Bowel management, Bladder management, Pain management, Mobility, Skin integrity and Safety management     ASSESSMENT, INTERVENTIONS AND CONTINUING PLAN FOR GOAL:  Pt is alert and oriented. Continent of bladder, voiding in urinal. Incontinent of small amount of stool x 1. Staff changed and cleaned pt. Pt denied pain. 0200 blood glucose: 98. Back incision is c/d/i with sutures. Turns with assist of 2 for perineal cares and repositioning. Pt slept well between cares. Bed alarm on for safety. Uses call light appropriately, able to make needs known. Will continue with POC.

## 2017-02-25 NOTE — CONSULTS
Health Psychology                  Clinic    Department of Medicine  Melissa Rodriguez, Ph.D., L.P. [phone]                          Clinics and Surgery Center  [facility] Анна Parikh, Ph.D.,  L.P. [phone]                 3rd Floor  [place] Mail [address]   Martin Ramos, Ph.D., HANK., L.P. [phone]     [address] [place] [address] Michelle Sahu, Ph.D., L.P. [phone]            [address]  [place] [address]           Lydia Bonilla, Ph.D., L.P. [phone]     Inpatient Health Psychology Consultation*     DATE OF SERVICE:  02/24/2017      REFERRAL SOURCE:  Hyun Koehler MD, Acute Rehabilitation Center, [facility].      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  Mr. Manning has a complex medical history in addition to his recent spinal surgery.  He is reporting some history of difficulty with sleep related to anxiety that has re-emerged during this admission.  He is also working to manage significant pain related to his recent spinal surgery.  This evaluation was requested to assess his emotional status and to make treatment recommendations.      HISTORY OF PRESENT ILLNESS:  Mr. Mannnig reports that he has no significant history of depression.  He notes that the only times that he has experienced feelings that he would identify as depression were the grief reactions he experienced following the death of each his parents.  He does note that he has occasional nights when he is aware that there are anxiety and problems that he is working to solve that affect his ability to sleep restfully.  He has used zolpidem very occasionally to address this prior to this hospitalization.      Currently, Mr. Manning believes that  "his mood is relatively positive, and he feels very hopeful about his ability to regain strength, stamina and his functional abilities to allow him to return to living independently.  He does see himself making some progress in his rehabilitation therapies.  At the same time, he does continue to report difficulty with sleeping that he attributes to anxiety.  He is not able to identify any specific anxious thoughts, but acknowledges that given the multiple medical issues that he is dealing with and his determination to return to a level of independence that he feels that anxiety is likely the source of his difficulty with sleep initiation.      Mr. Manning has been very motivated within his rehabilitation therapies.  At the same time, these have been somewhat limited secondary to dizziness thought to be related to orthostatic hypotension.  He was also noted earlier this week to have elevated heart rates in the 130s with activity and following a Cardiology consultation, atrial flutter was identified.      Mr. Manning was appreciative of his contact with Health Psychology, and would be open to additional contact throughout this ARC admission.      SOCIAL HISTORY:  Mr. Manning grew up in [place] and graduated from [employer].  Following that, he attended [employer] for approximately a year and a half before transferring to [employer].  He chose to enlist in the [employer] prior to graduation in order to not \"use up\" his student deferment during the [place] War era.  He did return to [employer] and completed his undergraduate work following his [employer] Service.  He was never put on active duty and considers himself very zheng in that regard.  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  He notes how well this meshed with his love of travel.  Mr. Manning never , but has been involved for approximately 30 years with his " significant other, Teresa; they have been living together for the past 4 years.  He notes that she has children from a previous marriage, but he is not very close with them, although there is no ill will or tension in the relationships.  Mr. Manning talks with great fondness about the friends that he still maintains close connections with from his [employer] [facility] years and tells me about some travel that they have done together fairly recently.      MEDICAL HISTORY:  Mr. Manning has a complex medical history including coronary artery disease, status post a three-vessel coronary arterial bypass in 1995 and cardiomyopathy with an ejection fraction of approximately 45%.  He was diagnosed approximately 8 years ago with CLL and underwent chemotherapy approximately 4 years ago which has left him in remission and he is pleased to be able to say that he is cancer free.  He has PAD and has lost one of his great toes secondary to this.  Please see Mr. Manning's [facility] electronic medical record for more complete information on his complex medical history, current admission and status and all medications.      PSYCHIATRIC HISTORY:  As above in HPI.  No other significant psychiatric history was available at the time of this evaluation.      BEHAVIORAL IMPRESSIONS:  Mr. Manning presented for this evaluation as a pleasant man who looks his chronological age.  We met briefly this morning and made a plan to spend more time together this afternoon.  I note that despite documentation of some cognitive changes and difficulty with memory, that he recalled that morning conversation and informed his physical therapist that I would be arriving at approximately 3:30 or 4:00.  He reported his mood as generally positive, but exhibited an affect that was clearly preoccupied with physical discomfort and pain.  His focus on pain did affect his ability to engage fully in conversation.  At the same  time, his speech was clear, coherent and goal oriented.  He was a good  of his own history when he was not distracted by physical discomfort.  Insight and judgment appear to be intact.  He exhibited no evidence of distortions of thought or perception.      IMPRESSIONS AND PLAN:  Peter Manning is a 76-year-old man with a complex medical history who recently underwent spinal surgery to address an epidural abscess related to thoracic osteomyelitis and diskitis.  Mr. Manning has some history of infrequent but distressing difficulty with sleep that he attributes to anxiety.  He has been experiencing this during this [facility] Rehabilitation Center admission.  He has Seroquel available to him at bedtime.  I note that he can have a 12.5 mg dose that can be followed 2 hours later by another 12.5 mg dose if he is not able to initiate sleep.  Although he has used Ambien in the past to address this issue, it would be preferable to assess the efficacy of Seroquel, which will also potentially assist with any anxiety.  Mr. Manning was very appreciative of this contact with Health Psychology.  He agreed that I might see him again next week and reassess if behavioral strategies to assist with sleep initiation and management of anxiety could be helpful.      DIAGNOSES:  Adjustment disorder with anxiety (F43.22).         MASTER MAURICIO, PHD, LP      *In accordance with the Rules of the [place] Board of [employer], it is noted that psychological descriptions and scientific procedures underlying psychological evaluations have limitations.  Absolute predictions cannot be made based on information in this report.     D: 2017 17:00   T: 2017 18:43   MT: ANDREW      Name:     PETER MANNING   MRN:      [id number]        Account:       [id number]   :      1940           Consult Date:  2017      Document: [id number]       cc: CESAR SALAZAR MD

## 2017-02-25 NOTE — PLAN OF CARE
Problem: Goal/Outcome  Goal: Goal Outcome Summary  Attempted to see pt for scheduled pm PT appt, however pt on medical hold. PT is tachycardic and is also being transfused. Missed 60 minutes. Pt with new HR parameters of no > 100

## 2017-02-25 NOTE — PROGRESS NOTES
Patient seen and examined    S- not much change, minimal improvement in dizziness on getting edge of bed.    No CP/Palpitations.     4 point ROS done and neg unless mentioned     O-   /72  Pulse 96  Temp 96.4  F (35.8  C) (Oral)  Resp 16  Ht 1.829 m (6')  Wt 86.5 kg (190 lb 12.8 oz)  SpO2 95%  BMI 25.88 kg/m2   Gen- pleasant  laying on bed  HEENT- NAD, ZENAIDA  Neck- supple, no JVD elevation, no thyromegaly  CVS- I+II+ no m/r/g. irregular   RS- CTAB  Abdo- soft, no tenderness . No g/r/r, BS+ no hepatosplenomegaly   Ext- edema    CNS-  Oriented to person, place and time      LABS:   BMP    Recent Labs  Lab 02/24/17  0553 02/21/17  1242 02/20/17  1559    137 137   POTASSIUM 3.4 3.6 3.4   CHLORIDE 105 102 102   JUSTINO 7.9* 8.4* 7.8*   CO2 26 26 25   BUN 11 11 11   CR 0.54* 0.56* 0.60*   * 206* 208*     CBC    Recent Labs  Lab 02/24/17  0553 02/20/17  0754   WBC 5.6 7.8   RBC 2.70* 2.82*   HGB 7.4* 7.7*   HCT 24.2* 25.0*   MCV 90 89   MCH 27.4 27.3   MCHC 30.6* 30.8*   RDW 16.7* 16.1*    273  247     Echo 1/21:  The left ventricle is mildly dilated at 6.0 cm. Left ventricular systolic  function is mild to moderately reduced as the visual ejection fraction is  estimated at 40-45%.  The biplane calculated LVEF = 43%. This decrease in the LVEF is due to mild  global hypokinesia and moderate to severe inferolateral wall hypokinesis.  Grade I or early diastolic dysfunction is noted  There is trace tricuspid regurgitation. The right ventricular systolic  pressure is normal approximated at 16mmHg plus the right atrial pressure.  The left atrium is moderately dilated by volume criteria at 41.6 ml/m2.  There is mild trileaflet aortic sclerosis but no aortic stenosis or  regurgitation is present.    Cortisol- 18     Echo 2/24: Technically difficult study.The patient's rhythm is atrial fibrillation.  The left ventricle is severely dilated. Left ventricular systolic function is  severely reduced, ejection  fraction is estimated at 25-30%. There is severe  global hypokinesis.  Right ventricular systolic function is mildly reduced.  The right ventricular systolic pressure is approximated at 15 mmHg plus the  right atrial pressure. IVC is plethoric and estimated mean RA pressure is 15  mmHg.  No pericardial effusion present.    A/P:  # Orthostatic Dizziness and Tachycardia: worse on tilt table   - Tachy w PAC's and fusion comlex on EKG 2/22: Atrial flutter with variable block 2/23.   - electrolytes stable : appreciate Cardiology consult 2/22   - Ct Orthostatic precautions   - encourage fluids   - as resting HR high and no change in BP:  changed Atenolol to Metoprolol bid  2/24(titrate up if tolerates) with improvement. Recheck EKG  * Echo decreased LVEF and severe global hypokinesis (? Reason for new arrythmia)_ D/W Cardiology fellow 2/25 to review and advise any change in Mx.   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   The patient should have a CBC, CRP, creatinine and LFTs done weekly.   Followup with Infectious Disease, Dr. Gillian Max on 03/23/2017.     Diabetes mellitus: Continue the Lantus at night along with sliding scale insulin.   Increased lantus to 20 units 2/19  Was on metformin at home , Started metformin 500 mg BID on 2/20    Recent Labs  Lab 02/25/17  0827 02/25/17  0206 02/24/17  2217 02/24/17  1757 02/24/17  1201 02/24/17  0758 02/24/17  0553  02/21/17  1242  02/20/17  1559   GLC  --   --   --   --   --   --  143*  --  206*  --  208*   * 98 114* 149* 155* 145*  --   < >  --   < >  --    < > = values in this interval not displayed.  Monitor blood sugars     peripheral vascular disease, status post bilateral lower extremity stents:   Continue aspirin , Plavix( started 2/20)      # Chronic  lymphocytic leukemia in remission, last chemo was 09/2015: Follow up with Oncology.   # Coronary artery disease: Continue aspirin 325 mg daily, Lipitor 40 mg daily, (Changed atenolol to Metoprolol 2/24).   # Spinal cord compression with lower extremity weakness: plan per  Primary team     D/W RN, Cardiology    Osmin Lange MD (Pager- [phone])   Internal Medicine/ Hospitalist

## 2017-02-25 NOTE — PLAN OF CARE
Problem: Goal/Outcome  Goal: Goal Outcome Summary  Outcome: No Change  FOCUS/GOAL  Bowel management, Bladder management and Psychosocial needs     ASSESSMENT, INTERVENTIONS AND CONTINUING PLAN FOR GOAL:  Pt used urinal independently x2, staff emptied. Some dribbling in brief noted, staff changed brief x2.   Used liko lift for transfers.   Pt had a shower using a shower chair, needing mod assist for washing/ drying.   Incontinence of small bm x1.  and 114.  Prn seroquel 12.5mg given at HS per request. Will assess effectiveness and offer 2nd dose of seroquel per POC.

## 2017-02-25 NOTE — PLAN OF CARE
Problem: Goal/Outcome  Goal: Goal Outcome Summary  Outcome: Improving  SLP: worked on written reasoning in treatment session - pt needed min assist for process of elimination, and demonstrated improvement as task progressed. Reading comprehension functional, goal met

## 2017-02-25 NOTE — PLAN OF CARE
Problem: Patient Care: Nursing Focus  Goal: Pain Management  FOCUS/GOAL  Medical management     ASSESSMENT, INTERVENTIONS AND CONTINUING PLAN FOR GOAL:  Incontinent of bowels soiling linens et bed. EKG done see results. Type et cross pending for transfusion, awaiting results. Taking food et fluids well.

## 2017-02-25 NOTE — PROGRESS NOTES
Patient Active Problem List   Diagnosis     Critical lower limb ischemia , s/p L AK pop to BK pop bypass with rGSV on 1/10/2014     Diabetes mellitus, type 2 (H)     HTN (hypertension)     CAD (coronary artery disease)     Hyperlipidemia with target LDL less than 100     Osteomyelitis left 2nd Toe s/p amputaion 1/10/14     Renal insufficiency     Renal failure     Fall     Discitis     Epidural abscess     Osteomyelitis of thoracic spine (H)     Atrial flutter (H)       Patient seen and examined today. She/He was observed in therapy session as well. Coordinated with team.      HPI/ACTIVE ISSUES   Tad Manning is a 76 year old male, admitted to [facility] ARU on 2/15/2017 with 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 His deficits are paraparesis, sensory deficits involving both LE, neurogenic bowel and bladder.   Medical co-morbidities include post-op pain, anemia, hyponatremia, and h/o bacteremia on IV antibiotic.   PMH significant for DM with peripheral neuropathy, HTN, HLD, CAD s/p CABG, systolic and diastolic heart failure, and CLL in remission.     Main issues today are ongoing pain management. Currently on tramadol, tylenol, and oxycodone. Reports some back pain, induced by sitting in chair.   Some LE edema ongoing  Not tachycardic during my exam. Discussed with hospitalist, EF down from 40 to 25%, concern for demand ischemia. Continue to titrate metoprolol, increase the dose to 50 mg BID, then add lisinopril josesito. Hold off therapies for today, and then start josesito, holding all therapies if Ht rate goes above 100 bpm. Being transfused for Hgb of 7.5.   Anxiety appears well controlled, continue Seroquel.   Blood sugars this morning were 98. Continue Lantus at 20 units daily and also was started on metformin 500 mg  Notes dry mouth, discussed options, caution with medications that effect the autonomic nervous system  given the medical background.   Functionally  he is utilizing sling and liko lift for transfers. Needing assist of 2 for tranfers, for perineal cares. He has good strength in the lower extremities. Mod assist with therapists. Continue the current intensity with PT/OT/SLP.       REVIEW OF THE SYSTEMS   Total of ten systems reviewed, pertinent positives and negatives as follows  Denies chest pain fever chills rigors  Denies any shortness of breath   Denies any nausea or vomiting   Appetite fair  Denies any lightheadedness or dizziness   On regular diet with thins/  Denies any sob or cough   Denies any new rashes   Mood anxious  LBM incontinence last night   Voiding without any problems, no incontinence for now   Slept well last night   Remainder of the review of the systems was negative      Physical exam    Vital signs:  Temp: 96.4  F (35.8  C) Temp src: Oral BP: 140/72 Pulse: 96   Resp: 16 SpO2: 95 % O2 Device: None (Room air)   Height: 182.9 cm (6') Weight: 86.5 kg (190 lb 12.8 oz) (bed scale)  Estimated body mass index is 25.88 kg/(m^2) as calculated from the following:    Height as of this encounter: 1.829 m (6').    Weight as of this encounter: 86.5 kg (190 lb 12.8 oz).  Sitting in wheel chair, alert and awake  Conversant   No particular anxiety noted   HEENT NC AT PRTL EOM good   Neck supple  Heart S1S2, regular pulses.  Lungs CTA  Abdomen  benign BS positive NT NR   LE no edema              REVIEWED THE MEDICATIONS BELOW   Current Facility-Administered Medications   Medication     metoprolol (LOPRESSOR) half-tab 37.5 mg     ramelteon (ROZEREM) tablet 8 mg     metFORMIN (GLUCOPHAGE) tablet 500 mg     clopidogrel (PLAVIX) tablet 75 mg     insulin glargine (LANTUS) injection 20 Units     acetaminophen (TYLENOL) tablet 1,000 mg     traMADol (ULTRAM) half-tab 25-50 mg     senna-docusate (SENOKOT-S;PERICOLACE) 8.6-50 MG per tablet 1 tablet     polyethylene glycol (MIRALAX/GLYCOLAX) Packet 17 g     bisacodyl  (DULCOLAX) Suppository 10 mg     Patient is already receiving anticoagulation with heparin, enoxaparin (LOVENOX), warfarin (COUMADIN)  or other anticoagulant medication     ascorbic acid (VITAMIN C) tablet 500 mg     aspirin tablet 325 mg     atorvastatin (LIPITOR) tablet 40 mg     cefTRIAXone (ROCEPHIN) 2 g vial to attach to  ml bag for ADULTS or NS 50 ml bag for PEDS     cholecalciferol (vitamin D) tablet 1,000 Units     lidocaine (LIDODERM) 5 % Patch 1 patch     tamsulosin (FLOMAX) capsule 0.8 mg     glucose 40 % gel 15-30 g    Or     dextrose 50 % injection 25-50 mL    Or     glucagon injection 1 mg     insulin aspart (NovoLOG) inj (RAPID ACTING)     insulin aspart (NovoLOG) inj (RAPID ACTING)     naloxone (NARCAN) injection 0.1-0.4 mg     sodium chloride (PF) 0.9% PF flush 10-20 mL     heparin lock flush 10 UNIT/ML injection 5-10 mL     heparin lock flush 10 UNIT/ML injection 5-10 mL     Facility-Administered Medications Ordered in Other Encounters   Medication     prochlorperazine (COMPAZINE) injection 5 mg         Total of 25 min spent in the encounter, more than 50% in coordination and counseling on topics listed in the HPI

## 2017-02-26 ENCOUNTER — APPOINTMENT (OUTPATIENT)
Dept: GENERAL RADIOLOGY | Facility: CLINIC | Age: 77
DRG: 949 | End: 2017-02-26
Attending: INTERNAL MEDICINE
Payer: MEDICARE

## 2017-02-26 LAB
ANION GAP SERPL CALCULATED.3IONS-SCNC: 9 MMOL/L (ref 3–14)
BUN SERPL-MCNC: 13 MG/DL (ref 7–30)
CALCIUM SERPL-MCNC: 8 MG/DL (ref 8.5–10.1)
CHLORIDE SERPL-SCNC: 105 MMOL/L (ref 94–109)
CO2 SERPL-SCNC: 25 MMOL/L (ref 20–32)
CREAT SERPL-MCNC: 0.61 MG/DL (ref 0.66–1.25)
GFR SERPL CREATININE-BSD FRML MDRD: ABNORMAL ML/MIN/1.7M2
GLUCOSE BLDC GLUCOMTR-MCNC: 119 MG/DL (ref 70–99)
GLUCOSE BLDC GLUCOMTR-MCNC: 120 MG/DL (ref 70–99)
GLUCOSE BLDC GLUCOMTR-MCNC: 122 MG/DL (ref 70–99)
GLUCOSE BLDC GLUCOMTR-MCNC: 159 MG/DL (ref 70–99)
GLUCOSE BLDC GLUCOMTR-MCNC: 163 MG/DL (ref 70–99)
GLUCOSE SERPL-MCNC: 126 MG/DL (ref 70–99)
LACTATE BLD-SCNC: 1.6 MMOL/L (ref 0.7–2.1)
MAGNESIUM SERPL-MCNC: 1.7 MG/DL (ref 1.6–2.3)
NT-PROBNP SERPL-MCNC: 9344 PG/ML (ref 0–900)
POTASSIUM SERPL-SCNC: 3.6 MMOL/L (ref 3.4–5.3)
SODIUM SERPL-SCNC: 139 MMOL/L (ref 133–144)

## 2017-02-26 PROCEDURE — 80048 BASIC METABOLIC PNL TOTAL CA: CPT | Performed by: INTERNAL MEDICINE

## 2017-02-26 PROCEDURE — 83880 ASSAY OF NATRIURETIC PEPTIDE: CPT | Performed by: INTERNAL MEDICINE

## 2017-02-26 PROCEDURE — 40000225 ZZH STATISTIC SLP WARD VISIT: Performed by: SPEECH-LANGUAGE PATHOLOGIST

## 2017-02-26 PROCEDURE — 25000128 H RX IP 250 OP 636: Performed by: INTERNAL MEDICINE

## 2017-02-26 PROCEDURE — 25000132 ZZH RX MED GY IP 250 OP 250 PS 637: Mod: GY | Performed by: INTERNAL MEDICINE

## 2017-02-26 PROCEDURE — 25000132 ZZH RX MED GY IP 250 OP 250 PS 637: Mod: GY | Performed by: PHYSICAL MEDICINE & REHABILITATION

## 2017-02-26 PROCEDURE — 36415 COLL VENOUS BLD VENIPUNCTURE: CPT

## 2017-02-26 PROCEDURE — 71010 XR CHEST PORT 1 VW: CPT

## 2017-02-26 PROCEDURE — 83605 ASSAY OF LACTIC ACID: CPT

## 2017-02-26 PROCEDURE — A9270 NON-COVERED ITEM OR SERVICE: HCPCS | Mod: GY | Performed by: INTERNAL MEDICINE

## 2017-02-26 PROCEDURE — 97535 SELF CARE MNGMENT TRAINING: CPT | Mod: GO

## 2017-02-26 PROCEDURE — A9270 NON-COVERED ITEM OR SERVICE: HCPCS | Mod: GY | Performed by: PHYSICAL MEDICINE & REHABILITATION

## 2017-02-26 PROCEDURE — 40000193 ZZH STATISTIC PT WARD VISIT: Performed by: STUDENT IN AN ORGANIZED HEALTH CARE EDUCATION/TRAINING PROGRAM

## 2017-02-26 PROCEDURE — 25000125 ZZHC RX 250: Performed by: PHYSICAL MEDICINE & REHABILITATION

## 2017-02-26 PROCEDURE — 36592 COLLECT BLOOD FROM PICC: CPT | Performed by: INTERNAL MEDICINE

## 2017-02-26 PROCEDURE — 97532 ZZHC SP COGNITIVE SKILLS EA 15 MIN: CPT | Mod: GN | Performed by: SPEECH-LANGUAGE PATHOLOGIST

## 2017-02-26 PROCEDURE — 99233 SBSQ HOSP IP/OBS HIGH 50: CPT | Performed by: INTERNAL MEDICINE

## 2017-02-26 PROCEDURE — 97530 THERAPEUTIC ACTIVITIES: CPT | Mod: GP | Performed by: STUDENT IN AN ORGANIZED HEALTH CARE EDUCATION/TRAINING PROGRAM

## 2017-02-26 PROCEDURE — 97110 THERAPEUTIC EXERCISES: CPT | Mod: GO

## 2017-02-26 PROCEDURE — 97110 THERAPEUTIC EXERCISES: CPT | Mod: GP | Performed by: STUDENT IN AN ORGANIZED HEALTH CARE EDUCATION/TRAINING PROGRAM

## 2017-02-26 PROCEDURE — 00000146 ZZHCL STATISTIC GLUCOSE BY METER IP

## 2017-02-26 PROCEDURE — 83735 ASSAY OF MAGNESIUM: CPT | Performed by: INTERNAL MEDICINE

## 2017-02-26 PROCEDURE — 12800006 ZZH R&B REHAB

## 2017-02-26 PROCEDURE — 40000133 ZZH STATISTIC OT WARD VISIT

## 2017-02-26 RX ORDER — FUROSEMIDE 10 MG/ML
20 INJECTION INTRAMUSCULAR; INTRAVENOUS ONCE
Status: COMPLETED | OUTPATIENT
Start: 2017-02-26 | End: 2017-02-26

## 2017-02-26 RX ORDER — FUROSEMIDE 20 MG
20 TABLET ORAL DAILY
Status: DISCONTINUED | OUTPATIENT
Start: 2017-02-26 | End: 2017-03-09

## 2017-02-26 RX ORDER — LISINOPRIL 2.5 MG/1
2.5 TABLET ORAL DAILY
Status: DISCONTINUED | OUTPATIENT
Start: 2017-02-26 | End: 2017-02-28

## 2017-02-26 RX ADMIN — LISINOPRIL 2.5 MG: 2.5 TABLET ORAL at 10:40

## 2017-02-26 RX ADMIN — METFORMIN HYDROCHLORIDE 500 MG: 500 TABLET ORAL at 09:10

## 2017-02-26 RX ADMIN — CEFTRIAXONE 2 G: 2 INJECTION, POWDER, FOR SOLUTION INTRAMUSCULAR; INTRAVENOUS at 12:18

## 2017-02-26 RX ADMIN — TRAMADOL HYDROCHLORIDE 50 MG: 50 TABLET, FILM COATED ORAL at 21:42

## 2017-02-26 RX ADMIN — FUROSEMIDE 20 MG: 10 INJECTION, SOLUTION INTRAMUSCULAR; INTRAVENOUS at 00:17

## 2017-02-26 RX ADMIN — ACETAMINOPHEN 1000 MG: 500 TABLET, FILM COATED ORAL at 09:10

## 2017-02-26 RX ADMIN — OXYCODONE HYDROCHLORIDE AND ACETAMINOPHEN 500 MG: 500 TABLET ORAL at 09:10

## 2017-02-26 RX ADMIN — TAMSULOSIN HYDROCHLORIDE 0.8 MG: 0.4 CAPSULE ORAL at 21:43

## 2017-02-26 RX ADMIN — METFORMIN HYDROCHLORIDE 500 MG: 500 TABLET ORAL at 18:56

## 2017-02-26 RX ADMIN — CEFTRIAXONE 2 G: 2 INJECTION, POWDER, FOR SOLUTION INTRAMUSCULAR; INTRAVENOUS at 01:24

## 2017-02-26 RX ADMIN — SODIUM CHLORIDE, PRESERVATIVE FREE 5 ML: 5 INJECTION INTRAVENOUS at 05:38

## 2017-02-26 RX ADMIN — RAMELTEON 8 MG: 8 TABLET, FILM COATED ORAL at 21:43

## 2017-02-26 RX ADMIN — ATORVASTATIN CALCIUM 40 MG: 40 TABLET, FILM COATED ORAL at 09:10

## 2017-02-26 RX ADMIN — VITAMIN D, TAB 1000IU (100/BT) 1000 UNITS: 25 TAB at 09:10

## 2017-02-26 RX ADMIN — FUROSEMIDE 20 MG: 10 INJECTION, SOLUTION INTRAMUSCULAR; INTRAVENOUS at 02:16

## 2017-02-26 RX ADMIN — ASPIRIN 325 MG ORAL TABLET 325 MG: 325 PILL ORAL at 09:10

## 2017-02-26 RX ADMIN — ACETAMINOPHEN 1000 MG: 500 TABLET, FILM COATED ORAL at 21:43

## 2017-02-26 RX ADMIN — CLOPIDOGREL BISULFATE 75 MG: 75 TABLET, FILM COATED ORAL at 09:10

## 2017-02-26 RX ADMIN — ACETAMINOPHEN 1000 MG: 500 TABLET, FILM COATED ORAL at 13:36

## 2017-02-26 RX ADMIN — METOPROLOL TARTRATE 50 MG: 50 TABLET, FILM COATED ORAL at 21:43

## 2017-02-26 RX ADMIN — FUROSEMIDE 20 MG: 20 TABLET ORAL at 12:17

## 2017-02-26 RX ADMIN — METOPROLOL TARTRATE 50 MG: 50 TABLET, FILM COATED ORAL at 09:10

## 2017-02-26 RX ADMIN — SODIUM CHLORIDE, PRESERVATIVE FREE 10 ML: 5 INJECTION INTRAVENOUS at 21:42

## 2017-02-26 NOTE — PROGRESS NOTES
Attempted to see pt for scheduled pm PT appt, however pt w/ c/o fatigues and dizziness. Pt had been sitting in chair for ~30 minutes for hair cut.  Missed 45 minutes.

## 2017-02-26 NOTE — PROGRESS NOTES
Patient Active Problem List   Diagnosis     Critical lower limb ischemia , s/p L AK pop to BK pop bypass with rGSV on 1/10/2014     Diabetes mellitus, type 2 (H)     HTN (hypertension)     CAD (coronary artery disease)     Hyperlipidemia with target LDL less than 100     Osteomyelitis left 2nd Toe s/p amputaion 1/10/14     Renal insufficiency     Renal failure     Fall     Discitis     Epidural abscess     Osteomyelitis of thoracic spine (H)     Atrial flutter (H)       Patient seen and examined today. She/He was observed in therapy session as well. Coordinated with team.      HPI/ACTIVE ISSUES   Tad Manning is a 76 year old male, admitted to [facility] ARU on 2/15/2017 with 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 His deficits are paraparesis, sensory deficits involving both LE, neurogenic bowel and bladder.   Medical co-morbidities include post-op pain, anemia, hyponatremia, and h/o bacteremia on IV antibiotic.   PMH significant for DM with peripheral neuropathy, HTN, HLD, CAD s/p CABG, systolic and diastolic heart failure, and CLL in remission.     Main issues today are tachycardia with Premature atrial complexes, with titration of medications, metoprolol increased to 50 mg BID, and plan to add lisinopril today. Received blood transfusion yesterday with increased SOB, subsequent CXR are consistent with diffuse interstitial edema, cardiomegaly and small bilateral pleural effusions.    He has severe global hypokinesia based on ECHO done on 2/24. With increase in metoprolol, tachycardia remains between  bpm, and blood pressures in 140'4/70's. On Plavix.   Can resume PT/OT, which had been held yesterday. Can continue PT/OT through heart rates less than 125 bpm. Hold if symptomatic. Orders placed   Anxiety appears well controlled, continue Seroquel. DM: continue blood sugar checks. On Lantus at 20 units daily and  also was started on metformin 500 mg        Physical exam    Vital signs:  Temp: 95.2  F (35.1  C) Temp src: Oral BP: 137/68 Pulse: 86   Resp: 20 SpO2: 97 % O2 Device: Nasal cannula Oxygen Delivery: 2.5 LPM Height: 182.9 cm (6') Weight: 87.1 kg (192 lb 1.6 oz) (bed scale)  Estimated body mass index is 26.05 kg/(m^2) as calculated from the following:    Height as of this encounter: 1.829 m (6').    Weight as of this encounter: 87.1 kg (192 lb 1.6 oz).  Sitting in wheel chair, alert and awake  Comfortable in no acute distress   No particular anxiety noted   HEENT NC AT PRTL EOM good   Neck supple  Heart S1S2, regular pulses.  Lungs CTA  Abdomen  benign BS positive NT NR   LE no edema              REVIEWED THE MEDICATIONS BELOW   Current Facility-Administered Medications   Medication     metoprolol (LOPRESSOR) tablet 50 mg     diphenhydrAMINE (BENADRYL) capsule 25 mg    Or     diphenhydrAMINE (BENADRYL) injection 25 mg     ramelteon (ROZEREM) tablet 8 mg     metFORMIN (GLUCOPHAGE) tablet 500 mg     clopidogrel (PLAVIX) tablet 75 mg     insulin glargine (LANTUS) injection 20 Units     acetaminophen (TYLENOL) tablet 1,000 mg     traMADol (ULTRAM) half-tab 25-50 mg     senna-docusate (SENOKOT-S;PERICOLACE) 8.6-50 MG per tablet 1 tablet     polyethylene glycol (MIRALAX/GLYCOLAX) Packet 17 g     bisacodyl (DULCOLAX) Suppository 10 mg     Patient is already receiving anticoagulation with heparin, enoxaparin (LOVENOX), warfarin (COUMADIN)  or other anticoagulant medication     ascorbic acid (VITAMIN C) tablet 500 mg     aspirin tablet 325 mg     atorvastatin (LIPITOR) tablet 40 mg     cefTRIAXone (ROCEPHIN) 2 g vial to attach to  ml bag for ADULTS or NS 50 ml bag for PEDS     cholecalciferol (vitamin D) tablet 1,000 Units     lidocaine (LIDODERM) 5 % Patch 1 patch     tamsulosin (FLOMAX) capsule 0.8 mg     glucose 40 % gel 15-30 g    Or     dextrose 50 % injection 25-50 mL    Or     glucagon injection 1 mg     insulin  aspart (NovoLOG) inj (RAPID ACTING)     insulin aspart (NovoLOG) inj (RAPID ACTING)     naloxone (NARCAN) injection 0.1-0.4 mg     sodium chloride (PF) 0.9% PF flush 10-20 mL     heparin lock flush 10 UNIT/ML injection 5-10 mL     heparin lock flush 10 UNIT/ML injection 5-10 mL     Facility-Administered Medications Ordered in Other Encounters   Medication     prochlorperazine (COMPAZINE) injection 5 mg         Total of 25 min spent in the encounter, more than 50% in coordination and counseling on topics listed in the HPI

## 2017-02-26 NOTE — PROGRESS NOTES
I was called about patient sob post blood transfusion.   CXR portable consistent with acute pulm edema.   Lasix 20 mg iv x 1 stat ordered.   Strict i/0, daily weights.      Recent Results (from the past 24 hour(s))   XR Chest Port 1 View    Narrative    XR CHEST PORT 1 VW 2/26/2017 1:09 AM    HISTORY: Acutely short of breath.    COMPARISON: 2/10/2017    FINDINGS: Small bilateral pleural effusions. Diffuse interstitial  edema. Right PICC line tip is in the projection of the SVC.  Cardiomegaly is redemonstrated.      Impression    IMPRESSION: Edema.    MELA ROSE MD       Fu:   Called Rn. Patient feeling better after first dose of lasix.   Vitals stable.   B/P: 142/78, T: 96.5, P: 89, R: 18    Lasix 20 mg iv x1 more ordered.     Temp low post transfusion w some sweating. RN to notify blood bank.   Checking bMp, mg in the am.       Panchito Christine MD  Moonlighter  2/26/2017

## 2017-02-26 NOTE — PROGRESS NOTES
Patient seen and examined    S- last night events noted. SOB after blood transfusion, now some improvement   No CP/ palpitaitons     4 point ROS done and neg unless mentioned     O-   /75  Pulse 75  Temp 97  F (36.1  C) (Oral)  Resp 17  Ht 1.829 m (6')  Wt 87.1 kg (192 lb 1.6 oz)  SpO2 96%  BMI 26.05 kg/m2   Gen- pleasant  laying on bed  HEENT- NAD, ZENAIDA  Neck- supple, no JVD elevation, no thyromegaly  CVS- I+II+ no m/r/g  RS- CTAB with basal crackles   Abdo- soft, no tenderness . No g/r/r   Ext- edema      LABS:   BMP    Recent Labs  Lab 02/26/17  0540 02/24/17  0553 02/21/17  1242 02/20/17  1559    139 137 137   POTASSIUM 3.6 3.4 3.6 3.4   CHLORIDE 105 105 102 102   JUSTINO 8.0* 7.9* 8.4* 7.8*   CO2 25 26 26 25   BUN 13 11 11 11   CR 0.61* 0.54* 0.56* 0.60*   * 143* 206* 208*     CBC    Recent Labs  Lab 02/25/17  1352 02/24/17  0553 02/20/17  0754   WBC  --  5.6 7.8   RBC  --  2.70* 2.82*   HGB 7.8* 7.4* 7.7*   HCT  --  24.2* 25.0*   MCV  --  90 89   MCH  --  27.4 27.3   MCHC  --  30.6* 30.8*   RDW  --  16.7* 16.1*   PLT  --  224 273  247     Echo 1/21:  The left ventricle is mildly dilated at 6.0 cm. Left ventricular systolic  function is mild to moderately reduced as the visual ejection fraction is  estimated at 40-45%.  The biplane calculated LVEF = 43%. This decrease in the LVEF is due to mild  global hypokinesia and moderate to severe inferolateral wall hypokinesis.  Grade I or early diastolic dysfunction is noted  There is trace tricuspid regurgitation. The right ventricular systolic  pressure is normal approximated at 16mmHg plus the right atrial pressure.  The left atrium is moderately dilated by volume criteria at 41.6 ml/m2.  There is mild trileaflet aortic sclerosis but no aortic stenosis or  regurgitation is present.    Cortisol- 18     Echo 2/24: Technically difficult study.The patient's rhythm is atrial fibrillation.  The left ventricle is severely dilated. Left ventricular systolic  function is  severely reduced, ejection fraction is estimated at 25-30%. There is severe  global hypokinesis.  Right ventricular systolic function is mildly reduced.  The right ventricular systolic pressure is approximated at 15 mmHg plus the  right atrial pressure. IVC is plethoric and estimated mean RA pressure is 15  mmHg.  No pericardial effusion present.    A/P:  # Acute systolic CHF:  Add Lasix 20 mg daily, strict I/O . Monitor - may need to dec BB     # Orthostatic Dizziness and Tachycardia: worse on tilt table   - Tachy w PAC's and fusion comlex on EKG 2/22: Atrial flutter with variable block 2/23.   - electrolytes stable : appreciate Cardiology consult  - Ct Orthostatic precautions   - as resting HR high and no change in BP:  changed Atenolol to Metoprolol bid  2/24 with improvement.  Increased to 50 mg bid 2/25  * Echo decreased LVEF and severe global hypokinesis (? 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   The patient should have a CBC, CRP, creatinine and LFTs done weekly.   Followup with Infectious Disease, Dr. Gillian Max on 03/23/2017.     Diabetes mellitus: Continue the Lantus at night along with sliding scale insulin.   Increased lantus to 20 units 2/19  Was on metformin at home , Started metformin 500 mg BID on 2/20    Recent Labs  Lab 02/26/17  0751 02/26/17  0540 02/26/17  0220 02/25/17  2212 02/25/17  1711 02/25/17  1155 02/25/17  0827  02/24/17  0553  02/21/17  1242  02/20/17  1559   GLC  --  126*  --   --   --   --   --   --  143*  --  206*  --  208*   *  --  159* 133* 137* 97 129*  < >  --   < >  --   < >  --    < > = values in this interval not displayed.  Monitor blood sugars     peripheral vascular disease, status post bilateral lower extremity stents:    Continue aspirin , Plavix( started 2/20)      # Chronic lymphocytic leukemia in remission, last chemo was 09/2015: Follow up with Oncology.   # Coronary artery disease: Continue aspirin 325 mg daily, Lipitor 40 mg daily, (Changed atenolol to Metoprolol 2/24).   # Spinal cord compression with lower extremity weakness: plan per  Primary team     Osmin Lange MD (Pager- [phone])   Internal Medicine/ Hospitalist

## 2017-02-26 NOTE — PLAN OF CARE
Problem: Goal/Outcome  Goal: Goal Outcome Summary  Outcome: Improving  SLP: pt has met language goals.  Focus on moderately complex cognitive communication goals (memory/attention, reasoning) pt needed only min assist today on task worked on few days ago, for noting options, decision making and working memory.

## 2017-02-26 NOTE — PLAN OF CARE
Problem: Goal/Outcome  Goal: Goal Outcome Summary  Outcome: Improving  Alert and oriented to self place and time. Patient received 1 unit of blood which finish infusing at at 2330. Immediately after Blood infusion, patient break out in sweat, became sob and tachypneic, house officer was notified and he ordered 20mg of lasix stat  intravenously and and chest xray. Temp 94.3 which triggered the lactic acid, Vitals done every 30 minutes see values in the flow sheet, Lactic acid 1.6, patient now stable and sleeping. Inconttinent of large loose stool which require bed linen and garment changes. Blood Bank updated about patient condition after blood infuision, will continue POC

## 2017-02-26 NOTE — PLAN OF CARE
Problem: Goal/Outcome  Goal: Goal Outcome Summary  Outcome: No Change  FOCUS/GOAL  Bowel management, Bladder management, Medication management and Medical management     ASSESSMENT, INTERVENTIONS AND CONTINUING PLAN FOR GOAL:  Pt continent of bladder and incontinent of bowel this shift. Pt received 1 unit of RBCs this evening. Called house officer prior to administration to obtain an order for benadryl d/t blood transfusion being listed in pt allergies. Also spoke with the blood bank to verify that blood was prepared and appropriate for pt. Benadryl given. Blood transfused per policy. No transfusion reaction noted, but after transfusion completed, pt reported marked SOB and increasing anxiety. Urinary output at that time was 100mL of janie urine. Notified charge nurse. Checked VS and started pt on O2 at 2LPM. House officer notified, and IV lasix was ordered as well as a chest x-ray. Educated pt on all procedures and new medications. Therapeutic listening provided and questions answered. Left sticky note for PMR to add a PRN medication for anxiety at night. On-coming nurse informed of situation and will continue to follow-up. Pt denies pain at this time. Continue with POC.

## 2017-02-26 NOTE — PLAN OF CARE
Problem: Patient Care: Nursing Focus  Goal: Pain Management  FOCUS/GOAL  Medical management, Mobility and Energy conservation     ASSESSMENT, INTERVENTIONS AND CONTINUING PLAN FOR GOAL:  Using oxygen @ 1 l/m, tried to wean off x 2 this shift et sat s dropped into mid 80's. No cyanosis or SOB, c/o dizziness. Oxygen reapplied et quickly returned to 94.Started on lisinopril et lasix today, strict I &O et daily wt's ordered. Taking food et fluids well. Up in w/c for haircut. Using sliding board with 2 staff for transfers. Vital signs stable.

## 2017-02-26 NOTE — PLAN OF CARE
Problem: Goal/Outcome  Goal: Goal Outcome Summary  OT: Pt able to engage in sliding board transfers and edge of bed reaching activities with extended breaks. HR and O2 stats monitored throughout and varied frequently. HR remained below 110 bpm throughout activities. Pt reported slight dizziness and lower L quadrant back pain.

## 2017-02-27 LAB
ALBUMIN SERPL-MCNC: 1.9 G/DL (ref 3.4–5)
ALP SERPL-CCNC: 137 U/L (ref 40–150)
ALT SERPL W P-5'-P-CCNC: 22 U/L (ref 0–70)
ANION GAP SERPL CALCULATED.3IONS-SCNC: 7 MMOL/L (ref 3–14)
AST SERPL W P-5'-P-CCNC: 19 U/L (ref 0–45)
BILIRUB DIRECT SERPL-MCNC: <0.1 MG/DL (ref 0–0.2)
BILIRUB SERPL-MCNC: 0.3 MG/DL (ref 0.2–1.3)
BUN SERPL-MCNC: 12 MG/DL (ref 7–30)
CALCIUM SERPL-MCNC: 7.8 MG/DL (ref 8.5–10.1)
CHLORIDE SERPL-SCNC: 106 MMOL/L (ref 94–109)
CO2 SERPL-SCNC: 28 MMOL/L (ref 20–32)
CREAT SERPL-MCNC: 0.63 MG/DL (ref 0.66–1.25)
ERYTHROCYTE [DISTWIDTH] IN BLOOD BY AUTOMATED COUNT: 17.4 % (ref 10–15)
GFR SERPL CREATININE-BSD FRML MDRD: ABNORMAL ML/MIN/1.7M2
GLUCOSE BLDC GLUCOMTR-MCNC: 116 MG/DL (ref 70–99)
GLUCOSE BLDC GLUCOMTR-MCNC: 120 MG/DL (ref 70–99)
GLUCOSE BLDC GLUCOMTR-MCNC: 130 MG/DL (ref 70–99)
GLUCOSE BLDC GLUCOMTR-MCNC: 133 MG/DL (ref 70–99)
GLUCOSE SERPL-MCNC: 97 MG/DL (ref 70–99)
HCT VFR BLD AUTO: 27.5 % (ref 40–53)
HGB BLD-MCNC: 8.3 G/DL (ref 13.3–17.7)
INTERPRETATION ECG - MUSE: NORMAL
INTERPRETATION ECG - MUSE: NORMAL
MCH RBC QN AUTO: 27 PG (ref 26.5–33)
MCHC RBC AUTO-ENTMCNC: 30.2 G/DL (ref 31.5–36.5)
MCV RBC AUTO: 90 FL (ref 78–100)
PLATELET # BLD AUTO: 220 10E9/L (ref 150–450)
POTASSIUM SERPL-SCNC: 3.7 MMOL/L (ref 3.4–5.3)
PROT SERPL-MCNC: 4.9 G/DL (ref 6.8–8.8)
RBC # BLD AUTO: 3.07 10E12/L (ref 4.4–5.9)
SODIUM SERPL-SCNC: 141 MMOL/L (ref 133–144)
WBC # BLD AUTO: 5 10E9/L (ref 4–11)

## 2017-02-27 PROCEDURE — 40000225 ZZH STATISTIC SLP WARD VISIT

## 2017-02-27 PROCEDURE — 36592 COLLECT BLOOD FROM PICC: CPT | Performed by: INTERNAL MEDICINE

## 2017-02-27 PROCEDURE — A9270 NON-COVERED ITEM OR SERVICE: HCPCS | Mod: GY | Performed by: INTERNAL MEDICINE

## 2017-02-27 PROCEDURE — 00000146 ZZHCL STATISTIC GLUCOSE BY METER IP

## 2017-02-27 PROCEDURE — 97532 ZZHC SP COGNITIVE SKILLS EA 15 MIN: CPT | Mod: GN | Performed by: SPEECH-LANGUAGE PATHOLOGIST

## 2017-02-27 PROCEDURE — 40000193 ZZH STATISTIC PT WARD VISIT

## 2017-02-27 PROCEDURE — 25000128 H RX IP 250 OP 636

## 2017-02-27 PROCEDURE — 97110 THERAPEUTIC EXERCISES: CPT | Mod: GP

## 2017-02-27 PROCEDURE — A9270 NON-COVERED ITEM OR SERVICE: HCPCS | Mod: GY | Performed by: PHYSICAL MEDICINE & REHABILITATION

## 2017-02-27 PROCEDURE — 99233 SBSQ HOSP IP/OBS HIGH 50: CPT | Mod: GC | Performed by: INTERNAL MEDICINE

## 2017-02-27 PROCEDURE — 97530 THERAPEUTIC ACTIVITIES: CPT | Mod: GP

## 2017-02-27 PROCEDURE — 40000225 ZZH STATISTIC SLP WARD VISIT: Performed by: SPEECH-LANGUAGE PATHOLOGIST

## 2017-02-27 PROCEDURE — 97532 ZZHC SP COGNITIVE SKILLS EA 15 MIN: CPT | Mod: GN

## 2017-02-27 PROCEDURE — 25000132 ZZH RX MED GY IP 250 OP 250 PS 637: Mod: GY | Performed by: PHYSICAL MEDICINE & REHABILITATION

## 2017-02-27 PROCEDURE — 25000132 ZZH RX MED GY IP 250 OP 250 PS 637: Mod: GY | Performed by: INTERNAL MEDICINE

## 2017-02-27 PROCEDURE — 25000128 H RX IP 250 OP 636: Performed by: INTERNAL MEDICINE

## 2017-02-27 PROCEDURE — 99233 SBSQ HOSP IP/OBS HIGH 50: CPT | Performed by: INTERNAL MEDICINE

## 2017-02-27 PROCEDURE — 12800006 ZZH R&B REHAB

## 2017-02-27 PROCEDURE — 25000125 ZZHC RX 250: Performed by: PHYSICAL MEDICINE & REHABILITATION

## 2017-02-27 PROCEDURE — 85027 COMPLETE CBC AUTOMATED: CPT | Performed by: INTERNAL MEDICINE

## 2017-02-27 PROCEDURE — 97535 SELF CARE MNGMENT TRAINING: CPT | Mod: GO

## 2017-02-27 PROCEDURE — 80048 BASIC METABOLIC PNL TOTAL CA: CPT | Performed by: INTERNAL MEDICINE

## 2017-02-27 PROCEDURE — 80076 HEPATIC FUNCTION PANEL: CPT | Performed by: INTERNAL MEDICINE

## 2017-02-27 PROCEDURE — 97110 THERAPEUTIC EXERCISES: CPT | Mod: GO

## 2017-02-27 PROCEDURE — 40000133 ZZH STATISTIC OT WARD VISIT

## 2017-02-27 RX ORDER — SODIUM CHLORIDE 9 MG/ML
INJECTION, SOLUTION INTRAVENOUS
Status: COMPLETED
Start: 2017-02-27 | End: 2017-02-27

## 2017-02-27 RX ADMIN — SODIUM CHLORIDE, PRESERVATIVE FREE 5 ML: 5 INJECTION INTRAVENOUS at 19:53

## 2017-02-27 RX ADMIN — ACETAMINOPHEN 1000 MG: 500 TABLET, FILM COATED ORAL at 19:52

## 2017-02-27 RX ADMIN — METFORMIN HYDROCHLORIDE 500 MG: 500 TABLET ORAL at 18:02

## 2017-02-27 RX ADMIN — VITAMIN D, TAB 1000IU (100/BT) 1000 UNITS: 25 TAB at 07:49

## 2017-02-27 RX ADMIN — ATORVASTATIN CALCIUM 40 MG: 40 TABLET, FILM COATED ORAL at 07:49

## 2017-02-27 RX ADMIN — RANITIDINE HYDROCHLORIDE 150 MG: 150 TABLET, FILM COATED ORAL at 15:22

## 2017-02-27 RX ADMIN — SODIUM CHLORIDE, PRESERVATIVE FREE 5 ML: 5 INJECTION INTRAVENOUS at 01:18

## 2017-02-27 RX ADMIN — CLOPIDOGREL BISULFATE 75 MG: 75 TABLET, FILM COATED ORAL at 07:48

## 2017-02-27 RX ADMIN — SODIUM CHLORIDE: 0.9 INJECTION, SOLUTION INTRAVENOUS at 13:27

## 2017-02-27 RX ADMIN — TAMSULOSIN HYDROCHLORIDE 0.8 MG: 0.4 CAPSULE ORAL at 19:51

## 2017-02-27 RX ADMIN — RANITIDINE HYDROCHLORIDE 150 MG: 150 TABLET, FILM COATED ORAL at 19:52

## 2017-02-27 RX ADMIN — RAMELTEON 8 MG: 8 TABLET, FILM COATED ORAL at 22:04

## 2017-02-27 RX ADMIN — CEFTRIAXONE 2 G: 2 INJECTION, POWDER, FOR SOLUTION INTRAMUSCULAR; INTRAVENOUS at 13:27

## 2017-02-27 RX ADMIN — LISINOPRIL 2.5 MG: 2.5 TABLET ORAL at 07:48

## 2017-02-27 RX ADMIN — METFORMIN HYDROCHLORIDE 500 MG: 500 TABLET ORAL at 07:49

## 2017-02-27 RX ADMIN — METOPROLOL TARTRATE 50 MG: 50 TABLET, FILM COATED ORAL at 07:48

## 2017-02-27 RX ADMIN — ASPIRIN 325 MG ORAL TABLET 325 MG: 325 PILL ORAL at 07:49

## 2017-02-27 RX ADMIN — CEFTRIAXONE 2 G: 2 INJECTION, POWDER, FOR SOLUTION INTRAMUSCULAR; INTRAVENOUS at 00:38

## 2017-02-27 RX ADMIN — ACETAMINOPHEN 1000 MG: 500 TABLET, FILM COATED ORAL at 07:48

## 2017-02-27 RX ADMIN — FUROSEMIDE 20 MG: 20 TABLET ORAL at 07:49

## 2017-02-27 RX ADMIN — OXYCODONE HYDROCHLORIDE AND ACETAMINOPHEN 500 MG: 500 TABLET ORAL at 07:48

## 2017-02-27 RX ADMIN — ACETAMINOPHEN 1000 MG: 500 TABLET, FILM COATED ORAL at 15:22

## 2017-02-27 RX ADMIN — METOPROLOL TARTRATE 50 MG: 50 TABLET, FILM COATED ORAL at 19:52

## 2017-02-27 RX ADMIN — SODIUM CHLORIDE, PRESERVATIVE FREE 5 ML: 5 INJECTION INTRAVENOUS at 05:32

## 2017-02-27 NOTE — PROGRESS NOTES
PM&R Daily Note:    Tad is feeling better since the blood transfusion the other day. Did get some diuresis. Energy improving.  Participating well in therapies today.   Patient care conference tomorrow to review remaining goals on rehab.    Appreciate hospitalist consult support. Has furosemide 20 daily. Following if he'll need additional BB.  DM with some reasonable BG control. Continues with metformin, glargine and aspart.    Not feeling feverish or chills. Appetite still low due to taste of food being off but no nausea.    Vitals:    02/27/17 0820 02/27/17 0830 02/27/17 0847 02/27/17 1515   BP: 135/61 132/67 144/70 151/76   BP Location:    Left arm   Pulse: 88 89 98 92   Resp:    16   Temp:    97.4  F (36.3  C)   TempSrc:    Oral   SpO2:    95%   Weight:       Height:         Alert, pleasant  Bright affect  Heart RRR  Lungs clear, I don't hear crackles laterally but didn't do posterior exam.  Some mild ankle edema.

## 2017-02-27 NOTE — PLAN OF CARE
Problem: Goal/Outcome  Goal: Goal Outcome Summary  Completed a deductive reasoning puzzle- complex level- completed most of it independently- 90% but needed 1 verbal cue to aid in divided attention.Complete visual recall task- recall of a pictured scene- pt scored 9/10.

## 2017-02-27 NOTE — PLAN OF CARE
Problem: Goal/Outcome  Goal: Goal Outcome Summary  PT: pt progressing well and needing only min A for slide board transfer at one time today.

## 2017-02-27 NOTE — PLAN OF CARE
Problem: Goal/Outcome  Goal: Goal Outcome Summary  FOCUS/GOAL  Bladder management, Pain management, Mobility, Skin integrity and Safety management     ASSESSMENT, INTERVENTIONS AND CONTINUING PLAN FOR GOAL:  Pt is alert and oriented. Continent of bladder, voiding in urinal. Denies pain. O2 at beginning of shift at 2 liters with Sats of 97%. O2 turned down to 1 liter and Sats were 95-96%. No c/o SOB or chest pain. Turned and repositioned with assist of 2. Back incision is c/d/i with sutures, open to air. Pt appeared to be sleeping during rounds. Bed alarm on for safety. Uses call light appropriately, able to make needs known. Will continue with POC.

## 2017-02-27 NOTE — PLAN OF CARE
Problem: Goal/Outcome  Goal: Goal Outcome Summary  Outcome: No Change  FOCUS/GOAL  Bladder management and Pain management     ASSESSMENT, INTERVENTIONS AND CONTINUING PLAN FOR GOAL:  Pt continent using urinal. Pt c/o of L flank pain at start of shift and reported that he had not voided for several hours with small output at that time. Pt also admitted that he stopped drinking water today because he didn't want to have to go more frequently d/t lasix. Educated pt on importance of oral fluid intake, and he verbalized and demonstrated understanding. Pt voided x4 and reported no further flank pain. Continue to monitor bladder status. LBM 2/26 with NOC shift. Pt c/o back pain at HS and was given tramadol x1 with some relief. Continue with POC.

## 2017-02-27 NOTE — PROGRESS NOTES
Patient seen and examined    S- feels better today. Breathing improved. O2 nearly weaned off    4 point ROS done and neg unless mentioned     O-   /70  Pulse 98  Temp 96.5  F (35.8  C) (Oral)  Resp 16  Ht 1.829 m (6')  Wt 87.1 kg (192 lb 1.6 oz)  SpO2 93%  BMI 26.05 kg/m2   Vitals:    02/15/17 1610 02/22/17 0620 02/26/17 0633   Weight: 90.3 kg (199 lb 1.6 oz) 86.5 kg (190 lb 12.8 oz) 87.1 kg (192 lb 1.6 oz)     Gen- pleasant  laying on bed  HEENT- NAD, ZENAIDA  Neck- supple, no JVD elevation, no thyromegaly  CVS- I+II+ no m/r/g  RS- CTAB with few basal crackles   Abdo- soft, no tenderness . No g/r/r   Ext- edema      LABS:   BMP    Recent Labs  Lab 02/27/17  0534 02/26/17  0540 02/24/17  0553 02/21/17  1242    139 139 137   POTASSIUM 3.7 3.6 3.4 3.6   CHLORIDE 106 105 105 102   JUSTINO 7.8* 8.0* 7.9* 8.4*   CO2 28 25 26 26   BUN 12 13 11 11   CR 0.63* 0.61* 0.54* 0.56*   GLC 97 126* 143* 206*     CBC    Recent Labs  Lab 02/27/17  0534  02/24/17  0553   WBC 5.0  --  5.6   RBC 3.07*  --  2.70*   HGB 8.3*  < > 7.4*   HCT 27.5*  --  24.2*   MCV 90  --  90   MCH 27.0  --  27.4   MCHC 30.2*  --  30.6*   RDW 17.4*  --  16.7*     --  224   < > = values in this interval not displayed.  Echo 1/21:  The left ventricle is mildly dilated at 6.0 cm. Left ventricular systolic  function is mild to moderately reduced as the visual ejection fraction is  estimated at 40-45%.  The biplane calculated LVEF = 43%. This decrease in the LVEF is due to mild  global hypokinesia and moderate to severe inferolateral wall hypokinesis.  Grade I or early diastolic dysfunction is noted  There is trace tricuspid regurgitation. The right ventricular systolic  pressure is normal approximated at 16mmHg plus the right atrial pressure.  The left atrium is moderately dilated by volume criteria at 41.6 ml/m2.  There is mild trileaflet aortic sclerosis but no aortic stenosis or  regurgitation is present.    Cortisol- 18     Echo 2/24:  Technically difficult study.The patient's rhythm is atrial fibrillation.  The left ventricle is severely dilated. Left ventricular systolic function is  severely reduced, ejection fraction is estimated at 25-30%. There is severe  global hypokinesis.  Right ventricular systolic function is mildly reduced.  The right ventricular systolic pressure is approximated at 15 mmHg plus the  right atrial pressure. IVC is plethoric and estimated mean RA pressure is 15  mmHg.  No pericardial effusion present.    A/P:  # Acute systolic CHF:  Added Lasix 20 mg daily 2/26, strict I/O . Monitor - may need to dec BB   Clinically better today     # Orthostatic Dizziness and Tachycardia: worse on tilt table : improved  - Tachy w PAC's and fusion comlex on EKG 2/22: Atrial flutter with variable block 2/23.   - electrolytes stable : appreciate Cardiology consult  - Ct Orthostatic precautions   - as resting HR high and no change in BP:  changed Atenolol to Metoprolol bid  2/24 with improvement.  Increased to 50 mg bid 2/25  * Echo decreased LVEF and severe global hypokinesis (? 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   The patient should have a CBC, CRP, creatinine and LFTs done weekly.   Followup with Infectious Disease, Dr. Gillian Max on 03/23/2017.     Diabetes mellitus: Continue the Lantus at night along with sliding scale insulin.   Increased lantus to 20 units 2/19  Was on metformin at home , Started metformin 500 mg BID on 2/20    Recent Labs  Lab 02/27/17  1213 02/27/17  0744 02/27/17  0534 02/26/17  2136 02/26/17  1712 02/26/17  1146 02/26/17  0751 02/26/17  0540  02/24/17  0553  02/21/17  1242  02/20/17  1559   GLC  --   --  97  --   --   --   --  126*  --  143*  --  206*  --  208*   * 116*  --   163* 120* 122* 119*  --   < >  --   < >  --   < >  --    < > = values in this interval not displayed.  Monitor blood sugars     peripheral vascular disease, status post bilateral lower extremity stents:   Continue aspirin , Plavix( started 2/20)    * Add Ranitidine for GI ppx- 2/27    # Chronic lymphocytic leukemia in remission, last chemo was 09/2015: Follow up with Oncology.   # Coronary artery disease: Continue aspirin 325 mg daily, Lipitor 40 mg daily, (Changed atenolol to Metoprolol 2/24).   # Spinal cord compression with lower extremity weakness: plan per  Primary team     Osmin Lange MD (Pager- [phone])   Internal Medicine/ Hospitalist

## 2017-02-27 NOTE — PLAN OF CARE
Problem: Goal/Outcome  Goal: Goal Outcome Summary  Outcome: Improving  FOCUS/GOAL  Medication management     ASSESSMENT, INTERVENTIONS AND CONTINUING PLAN FOR GOAL:  Pt A&O. VSS,. Fluids promoted, PICC lumens patent, Red is heparin locked, Purple is saline locked after atbx infusion. Denies SOB, numbness or tingling in all extremities and denies pain. Pt has been educated three times this shift about off loading weight from back and coccyx. Pt agrees this is a good idea and then fails to lay in any position except supine.  Area around incisions on back are dusky and there is a need to have pt laying on left or right side instead of supine. Pt able to make needs known and uses call light appropriately.  Will continue to monitor.

## 2017-02-28 LAB
GLUCOSE BLDC GLUCOMTR-MCNC: 116 MG/DL (ref 70–99)
GLUCOSE BLDC GLUCOMTR-MCNC: 117 MG/DL (ref 70–99)
GLUCOSE BLDC GLUCOMTR-MCNC: 148 MG/DL (ref 70–99)
GLUCOSE BLDC GLUCOMTR-MCNC: 152 MG/DL (ref 70–99)
GLUCOSE BLDC GLUCOMTR-MCNC: 157 MG/DL (ref 70–99)

## 2017-02-28 PROCEDURE — 99232 SBSQ HOSP IP/OBS MODERATE 35: CPT | Performed by: INTERNAL MEDICINE

## 2017-02-28 PROCEDURE — 25000132 ZZH RX MED GY IP 250 OP 250 PS 637: Mod: GY | Performed by: INTERNAL MEDICINE

## 2017-02-28 PROCEDURE — A9270 NON-COVERED ITEM OR SERVICE: HCPCS | Mod: GY | Performed by: PHYSICAL MEDICINE & REHABILITATION

## 2017-02-28 PROCEDURE — 25000132 ZZH RX MED GY IP 250 OP 250 PS 637: Mod: GY | Performed by: PHYSICAL MEDICINE & REHABILITATION

## 2017-02-28 PROCEDURE — 97532 ZZHC SP COGNITIVE SKILLS EA 15 MIN: CPT | Mod: GN | Performed by: SPEECH-LANGUAGE PATHOLOGIST

## 2017-02-28 PROCEDURE — 40000133 ZZH STATISTIC OT WARD VISIT

## 2017-02-28 PROCEDURE — A9270 NON-COVERED ITEM OR SERVICE: HCPCS | Mod: GY | Performed by: INTERNAL MEDICINE

## 2017-02-28 PROCEDURE — 40000225 ZZH STATISTIC SLP WARD VISIT: Performed by: SPEECH-LANGUAGE PATHOLOGIST

## 2017-02-28 PROCEDURE — 25000125 ZZHC RX 250: Performed by: PHYSICAL MEDICINE & REHABILITATION

## 2017-02-28 PROCEDURE — 97535 SELF CARE MNGMENT TRAINING: CPT | Mod: GO

## 2017-02-28 PROCEDURE — 40000193 ZZH STATISTIC PT WARD VISIT: Performed by: PHYSICAL THERAPIST

## 2017-02-28 PROCEDURE — 97530 THERAPEUTIC ACTIVITIES: CPT | Mod: GP | Performed by: PHYSICAL THERAPIST

## 2017-02-28 PROCEDURE — 25000128 H RX IP 250 OP 636: Performed by: INTERNAL MEDICINE

## 2017-02-28 PROCEDURE — 97110 THERAPEUTIC EXERCISES: CPT | Mod: GP | Performed by: PHYSICAL THERAPIST

## 2017-02-28 PROCEDURE — 00000146 ZZHCL STATISTIC GLUCOSE BY METER IP

## 2017-02-28 PROCEDURE — 12800006 ZZH R&B REHAB

## 2017-02-28 RX ORDER — LISINOPRIL 2.5 MG/1
2.5 TABLET ORAL ONCE
Status: COMPLETED | OUTPATIENT
Start: 2017-02-28 | End: 2017-02-28

## 2017-02-28 RX ORDER — LISINOPRIL 5 MG/1
5 TABLET ORAL DAILY
Status: DISCONTINUED | OUTPATIENT
Start: 2017-03-01 | End: 2017-03-06

## 2017-02-28 RX ADMIN — METFORMIN HYDROCHLORIDE 500 MG: 500 TABLET ORAL at 08:09

## 2017-02-28 RX ADMIN — CEFTRIAXONE 2 G: 2 INJECTION, POWDER, FOR SOLUTION INTRAMUSCULAR; INTRAVENOUS at 00:49

## 2017-02-28 RX ADMIN — OXYCODONE HYDROCHLORIDE AND ACETAMINOPHEN 500 MG: 500 TABLET ORAL at 08:09

## 2017-02-28 RX ADMIN — Medication 25 MG: at 21:52

## 2017-02-28 RX ADMIN — RANITIDINE HYDROCHLORIDE 150 MG: 150 TABLET, FILM COATED ORAL at 08:09

## 2017-02-28 RX ADMIN — CEFTRIAXONE 2 G: 2 INJECTION, POWDER, FOR SOLUTION INTRAMUSCULAR; INTRAVENOUS at 12:29

## 2017-02-28 RX ADMIN — VITAMIN D, TAB 1000IU (100/BT) 1000 UNITS: 25 TAB at 08:09

## 2017-02-28 RX ADMIN — ACETAMINOPHEN 1000 MG: 500 TABLET, FILM COATED ORAL at 08:09

## 2017-02-28 RX ADMIN — RANITIDINE HYDROCHLORIDE 150 MG: 150 TABLET, FILM COATED ORAL at 21:51

## 2017-02-28 RX ADMIN — INSULIN ASPART 1 UNITS: 100 INJECTION, SOLUTION INTRAVENOUS; SUBCUTANEOUS at 08:14

## 2017-02-28 RX ADMIN — METFORMIN HYDROCHLORIDE 500 MG: 500 TABLET ORAL at 18:21

## 2017-02-28 RX ADMIN — LISINOPRIL 2.5 MG: 2.5 TABLET ORAL at 08:09

## 2017-02-28 RX ADMIN — ASPIRIN 325 MG ORAL TABLET 325 MG: 325 PILL ORAL at 08:09

## 2017-02-28 RX ADMIN — TRAMADOL HYDROCHLORIDE 50 MG: 50 TABLET, FILM COATED ORAL at 01:30

## 2017-02-28 RX ADMIN — RAMELTEON 8 MG: 8 TABLET, FILM COATED ORAL at 21:55

## 2017-02-28 RX ADMIN — ACETAMINOPHEN 1000 MG: 500 TABLET, FILM COATED ORAL at 14:11

## 2017-02-28 RX ADMIN — CLOPIDOGREL BISULFATE 75 MG: 75 TABLET, FILM COATED ORAL at 08:09

## 2017-02-28 RX ADMIN — METOPROLOL TARTRATE 50 MG: 50 TABLET, FILM COATED ORAL at 21:51

## 2017-02-28 RX ADMIN — FUROSEMIDE 20 MG: 20 TABLET ORAL at 08:09

## 2017-02-28 RX ADMIN — INSULIN ASPART 1 UNITS: 100 INJECTION, SOLUTION INTRAVENOUS; SUBCUTANEOUS at 12:28

## 2017-02-28 RX ADMIN — ATORVASTATIN CALCIUM 40 MG: 40 TABLET, FILM COATED ORAL at 08:09

## 2017-02-28 RX ADMIN — ACETAMINOPHEN 1000 MG: 500 TABLET, FILM COATED ORAL at 20:25

## 2017-02-28 RX ADMIN — METOPROLOL TARTRATE 50 MG: 50 TABLET, FILM COATED ORAL at 08:09

## 2017-02-28 RX ADMIN — LISINOPRIL 2.5 MG: 2.5 TABLET ORAL at 12:25

## 2017-02-28 RX ADMIN — SODIUM CHLORIDE, PRESERVATIVE FREE 10 ML: 5 INJECTION INTRAVENOUS at 20:25

## 2017-02-28 RX ADMIN — TAMSULOSIN HYDROCHLORIDE 0.8 MG: 0.4 CAPSULE ORAL at 20:25

## 2017-02-28 NOTE — PROGRESS NOTES
[facility], [place]   Hospitalist Daily Progress Note    17                                                   Date of Admission:2/15/2017  ___________________________________  INTERVAL HISTORY (24 Hrs)/SUBJECTIVE:   Last 24 hr events, care team notes reviewed.     Says doing well  No cp or sob or palpitations or LH  No fever or chills  No NV       ROS: 4 point ROS neg other than the symptoms noted above in the interval history.    OBJECTIVE :   VITALS:    Temp:  [96.6  F (35.9  C)-97.8  F (36.6  C)] 96.6  F (35.9  C)  Pulse:  [] 115  Resp:  [16] 16  BP: (133-166)/(64-83) 133/64  SpO2:  [93 %-96 %] 93 %  Temp (24hrs), Av.3  F (36.3  C), Min:96.6  F (35.9  C), Max:97.8  F (36.6  C)    Wt Readings from Last 5 Encounters:   17 87.1 kg (192 lb 1.6 oz)   17 81.2 kg (179 lb)   17 85.7 kg (188 lb 15 oz)   16 95.3 kg (210 lb)   16 95.3 kg (210 lb)        Intake/Output Summary (Last 24 hours) at 17 1456  Last data filed at 17 1400   Gross per 24 hour   Intake              260 ml   Output              675 ml   Net             -415 ml       PHYSICAL EXAM:  General: alert, interactive, NAD  HEENT: AT/NC, Moist MM  Respi/Chest: Non labored. Clear BL, diminished at bases.   CVS/Heart: S1S2 regular,   Gi/Abd: Soft, non tender, non distended, BS +  MSK/Ext: Distal pulses 2+.     Neuro: AO x3     Medications:   I have reviewed this patient's current medications.    Data:   All laboratory and imaging data in the past 24 hours reviewed:    LABS:  CMP  Recent Labs  Lab 17  0534 17  0540 17  0553    139 139   POTASSIUM 3.7 3.6 3.4   CHLORIDE 106 105 105   CO2 28 25 26   ANIONGAP 7 9 8   GLC 97 126* 143*   BUN 12 13 11   CR 0.63* 0.61* 0.54*   GFRESTIMATED >90Non  GFR Calc >90Non  GFR Calc >90Non  GFR Calc   GFRESTBLACK >90African American GFR Calc >90African American GFR Calc  >90African American GFR Calc   JUSTINO 7.8* 8.0* 7.9*   MAG  --  1.7  --    PROTTOTAL 4.9*  --   --    ALBUMIN 1.9*  --   --    BILITOTAL 0.3  --   --    ALKPHOS 137  --   --    AST 19  --   --    ALT 22  --   --      CBC  Recent Labs  Lab 02/27/17  0534 02/25/17  1352 02/24/17  0553   WBC 5.0  --  5.6   RBC 3.07*  --  2.70*   HGB 8.3* 7.8* 7.4*   HCT 27.5*  --  24.2*   MCV 90  --  90   MCH 27.0  --  27.4   MCHC 30.2*  --  30.6*   RDW 17.4*  --  16.7*     --  224     INRNo lab results found in last 7 days.  Unresulted Labs Ordered in the Past 30 Days of this Admission     No orders found from 12/17/2016 to 2/16/2017.          No results found for this or any previous visit (from the past 24 hour(s)).    Echo 2/24:   Technically difficult study.The patient's rhythm is atrial fibrillation.    The left ventricle is severely dilated. Left ventricular systolic function is  severely reduced, ejection fraction is estimated at 25-30%. There is severe  global hypokinesis.  Right ventricular systolic function is mildly reduced.  The right ventricular systolic pressure is approximated at 15 mmHg plus the  right atrial pressure.   IVC is plethoric and estimated mean RA pressure is 15 mmHg.  No pericardial effusion present.         ASSESSMENT & PLAN :    # Acute systolic CHF: Started on Lasix 20 mg daily 2/26, strict I/O . Monitor   Clinically continues to feel better  Intermittent BMP.      # Orthostatic Dizziness and Tachycardia: worse on tilt table : improved  - Tachy w PAC's and fusion comlex on EKG 2/22: Atrial flutter with variable block 2/23.   - electrolytes stable : appreciate Cardiology consult  - Ct Orthostatic precautions   - as resting HR high and no change in BP: changed Atenolol to Metoprolol bid 2/24 with improvement. Increased to 50 mg bid 2/25  * Echo decreased LVEF and severe global hypokinesis (? 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   The patient should have a CBC, CRP, creatinine and LFTs done weekly.   Followup with Infectious Disease, Dr. Gillian Max on 03/23/2017.      Diabetes mellitus: Continue the Lantus at night along with sliding scale insulin.   Increased lantus to 20 units 2/19  Was on metformin at home , Started metformin 500 mg BID on 2/20     Recent Labs  Lab 02/27/17  1213 02/27/17  0744 02/27/17  0534 02/26/17  2136 02/26/17  1712 02/26/17  1146 02/26/17  0751 02/26/17  0540   02/24/17  0553   02/21/17  1242   02/20/17  1559   GLC --  --  97 --  --  --  --  126* --  143* --  206* --  208*   * 116* --  163* 120* 122* 119* --  < > --  < > --  < > --    < > = values in this interval not displayed.  Monitor blood sugars      peripheral vascular disease, status post bilateral lower extremity stents:   Continue aspirin , Plavix  (started 2/20)   * Added Ranitidine for GI ppx- 2/27     # Chronic lymphocytic leukemia in remission, last chemo was 09/2015: Follow up with Oncology.   # Coronary artery disease: Continue aspirin 325 mg daily, Lipitor 40 mg daily, Metoprolol.   # Spinal cord compression with lower extremity weakness: plan per Primary team        Panchito Christine MD   Hospitalist (Internal Medicine)  Pager: [phone].

## 2017-02-28 NOTE — PROGRESS NOTES
Cardiology Progress Note    Events and interval changes in past 24 hours:   Had dyspnea after getting blood transfusion but otherwise denies dyspnea. No orthopnea or chest pain.     OBJECTIVE FINDINGS:  /68 (BP Location: Left arm)  Pulse 91  Temp 97.4  F (36.3  C) (Oral)  Resp 16  Ht 1.829 m (6')  Wt 87.1 kg (192 lb 1.6 oz)  SpO2 96%  BMI 26.05 kg/m2    Gen: Patient is AOx3, in NAD. Appears comfortable.    CV: RRR, S1 & S2, no murmurs, JVP 6cm, no peripheral edema  Lungs: CTAB    Intake/Output Summary (Last 24 hours) at 02/27/17 1956  Last data filed at 02/27/17 1833   Gross per 24 hour   Intake              480 ml   Output              600 ml   Net             -120 ml     Wt Readings from Last 5 Encounters:   02/26/17 87.1 kg (192 lb 1.6 oz)   02/07/17 81.2 kg (179 lb)   01/24/17 85.7 kg (188 lb 15 oz)   12/29/16 95.3 kg (210 lb)   12/22/16 95.3 kg (210 lb)         Labs  Reviewed in epic    Last ECG from 2/25 seems to show NSR. His tachycardia with activity may be sinus tachycardia from deconditioning.     1. Atrial flutter, now in NSR  - Continue metoprolol to 50mg BID. His BP has been good. Please see previous note regarding plan for anticoagulation     2. Cardiomyopathy  -suspect EF is worse b/c tachycardia mediated from aflutter. Other possibility is stress induced. Less likely ischemic.   - recommend uptitrating Lisinopril to 10mg once daily gradually (5mg then 10mg) over few days if SBP >120/80 consistently.  -recommend follow up in [facility] cardiology in one month after discharge with repeat echo to assess EF and rate control respectively.      Antoine Conroy  General Cardiology Fellow  Pager [phone]             Staffed with Dr. Lucio Conroy    ATTENDING ATTESTATION:  Patient has been seen and evaluated by me on February 27, 2017. I have reviewed the medications, laboratory, imaging, and other relevant results.  I agree with the above note which I have edited, as necessary.  I have discussed  my assessment and plan with the house staff.    Clarisse Valdes MD, MS  Staff Cardiologist, [facility]  Pager: [phone]      General cards fellow, PGY4  Pager [phone]

## 2017-02-28 NOTE — PLAN OF CARE
"Problem: Goal/Outcome  Goal: Goal Outcome Summary  BG this AM: 152 & 157 at noon. Fluids encouraged.  Pleasant & cooperative with all cares.  [facility] that [facility] status is being placed as \"on hold\" & would like to touch base with social work.  Chelsea in social work notified.  Additional dose of 2.5 mg of lisinopril ordered & administered.  Rocephin administered via R PICC.        "

## 2017-02-28 NOTE — PLAN OF CARE
Problem: Goal/Outcome  Goal: Goal Outcome Summary  Stood in // with assist of one, showing promise for functional return in LE's.

## 2017-02-28 NOTE — PLAN OF CARE
Problem: Goal/Outcome  Goal: Goal Outcome Summary  Completed a complex deductive reasoning puzzle- independently and with 100% accuracy in a timely manner. Compelted a lumosity task involving both immediate memory and planning/ problem solving- pinball game- pt needed min cues with this more compelx memory game. With memory match game- compelted both the 4x5 and 5x6 grids with attemting to make matches from pictured items- improved with speed and accuracy with each successive trial. Compelted visual memory task with reading article in a newspaper 2x through and then answering questions based on recall- pt scored 9/10 accuracy with this. Encouraged memory notebook use. Pt feels he is at or close to being back to baseline cognition skills. Consider cutting back sptx and/or d/cing from sptx in 1-2 days pending continued progress.

## 2017-02-28 NOTE — PLAN OF CARE
Problem: Goal/Outcome  Goal: Goal Outcome Summary  Outcome: Improving  FOCUS/GOAL  Bladder management, Pain management, Medical management, Mobility and Skin integrity     ASSESSMENT, INTERVENTIONS AND CONTINUING PLAN FOR GOAL:  Seen by cardiologist with recommendations to be done after pt discharge , Dr Leroy is aware of it , pt has no SOBeven with turning and repositioning on bed  this shift O2sats  Maintained above 90 % ,  Turned from left side lying to rt side lying to off load pressure from the back incision and also  To prevent skin breakdown on the buttocks area ,  2staff assisted with repositioning pt on bed to conserve the energy of the patient for the next day activity especially with  His therapy sessions encouraged the use of IS , barrier cream applied on the dusky area on the buttocks after pericare , continent of urine using the urinal , staff cleaned it after pt's used and set itup at bedside , lower back pain is controlled with scheduled Tylenol , pt wants his Ramelteon for sleep at 2200  Continue to assist with safe performance of ADLS and continue with turning and repositioning schedule .

## 2017-02-28 NOTE — PROGRESS NOTES
[facility]   Acute Rehabilitation Unit  Daily progress note    interval history  Tad Manning was seen and examined at bedside. Had difficulty falling and staying asleep last night; some anxiety as well. Reports intermittent pain in low back area and some itching around his incision. Reviewed the medical plan for the management of his pleural effusion, tachycardia/A flutter and BP at length.     Doing better in therapies; was able to stand during his PT session and was very pleased with this progress. Discussed his progress and functional outcomes; will most likely require TCU placement before he can dc home - care conf tomorrow.     medications    [START ON 3/1/2017] lisinopril  5 mg Oral Daily     ranitidine  150 mg Oral BID     furosemide  20 mg Oral Daily     metoprolol  50 mg Oral BID     ramelteon  8 mg Oral At Bedtime     metFORMIN  500 mg Oral BID w/meals     clopidogrel  75 mg Oral Daily     insulin glargine  20 Units Subcutaneous QAM AC     acetaminophen  1,000 mg Oral TID     ascorbic acid (VITAMIN C) tablet 500 mg  500 mg Oral Daily     aspirin  325 mg Oral Daily     atorvastatin  40 mg Oral Daily     cefTRIAXone  2 g Intravenous Q12H     cholecalciferol  1,000 Units Oral Daily     tamsulosin  0.8 mg Oral At Bedtime     insulin aspart  1-7 Units Subcutaneous TID AC     insulin aspart  1-5 Units Subcutaneous At Bedtime     heparin lock flush  5-10 mL Intracatheter Q24H     QUEtiapine, diphenhydrAMINE **OR** diphenhydrAMINE, tramadol, senna-docusate, polyethylene glycol, bisacodyl, - MEDICATION INSTRUCTIONS -, lidocaine, glucose **OR** dextrose **OR** glucagon, naloxone, sodium chloride (PF), heparin lock flush       physical exam  /64  Pulse 115  Temp 96.6  F (35.9  C) (Oral)  Resp 16  Ht 1.829 m (6')  Wt 87.1 kg (192 lb 1.6 oz)  SpO2 93%  BMI 26.05 kg/m2     Gen: NAD, resting in bed   HEENT: has glasses   Cardio: RRR  Pulm: fine crackles at bases  b/l  Abd: soft and non-tender   Ext: no edema in legs, no tenderness in calves, right PICC Line intact    Neuro/MSK: alert and oriented, strength 3/5 HF, 4-/5 KE/KF, 4/5 DF, EHL and PF b/l, paresthesia at plantar aspect of feet b/l     Skin: thoracic surgical incision with sutures in place and mild surrounding erythema - superficially open areas with no s/s of infection       labs  BG in good range     assessment and plan  Tad Manning is a 76 year old right hand dominant male with 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 Medical co-morbidities include post-op pain, anemia, hyponatremia, and h/o bacteremia on IV antibiotic. PMH significant for DM with peripheral neuropathy, HTN, HLD, CAD s/p CABG, systolic and diastolic heart failure, and CLL in remission.      Functional deficits are weakness in bilateral lower extremities, sensory loss in feet (due to diabetic neuropathy but worse likely due to SCI), neurogenic bladder and bowel, and acute on chronic deconditioning with poor endurance.         Admission to acute inpatient rehab 2/15.     Rehabilitation: please see interval history for updates. Ordered SLP on 2/16 given his low score on SLUMS 23/30.      Medical Conditions; appreciate hospitalist team assistance in medical management.      # Incomplete SCI:  # Thoracic osteomyelitis/discitis with epidural abscess:  # S/p T9-10 laminectomy, T7-12 posterior arthrodesis and TF right sided T9-10 fusion.   # Strep mitis bacteremia:      --TLSO for comfort  --wound care per recs; will remove sutures at 2 weeks higinio ~ 2/26. Removed on 2/28.  --PICC line care on the right arm   -nurse reported green discharge 2/22, no clinical s/s of infection - will monitor   --pain management with prn tylenol, tramadol, and oxycodone   -2/19 tylenol was scheduled to help with pain and minimize narcotic use. Also tramadol was added to  again minimize oxycodone intake due to side effects like urinary retention.    -2/22 requiring tramadol and oxycodone x1-2/day; will monitor for one more day and dc oxycodone. 2/24 dc'd oxy.    --continue IV rocephin for total of 6 weeks - end date 3/24  --should f/u with ID and neurosurgery teams as outpatient      ** Check CBC, CRP, creatinine, and LFTs weekly while on therapy and have results faxed to the [facility] at [phone]. - faxed on 2/21.     Hyponatremia: resolved since 2/12 and stable ~ 131. 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 Repeat labs 2/20 Na 137.       Anemia: likely due to acute illness and blood loss; stable since his surgery. Will monitor clinically and repeat labs as needed.     Anxiety: patient reports history of occasional nighttime anxiety associated with inability to sleep prior to this admission. In the past, he has taken Zolpidem prn for this. Now complains of three consecutive nights of anxiety. Was given 12.5mg seroquel 2/22, patient says he thinks it helped.  --started scheduled ramelteon and prn seroquel on 2/23  --discussed with pt and ordered rehab psychology consult      # DM type II with neuropathy: on glipizide and victoza injections prior to admission. HgbA1C was 6.1% on 2/10. Currently on lantus 12 units daily and SSI. 2/17 lantus was increased to 14 for better control. 2/21 lantus increased to 20 daily and also was started on metformin 500 bid. 2/28 not requiring SSI.      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 Continue ASA; plavix restarted on 2/20.     # HTN: on amlodipine, atenolol, lisinopril and prinzide (?) prior to admission. Currently on atenolol 25 daily; BP has been variable with new arrhythmia - see below.    # Orthostatic hypotension: as confirmed by PT on 2/17. Multifactorial due to anemia, severe deconditioning and possibly autonomic instability  "due SCI. Should have TEDs and abdominal binder before getting out of bed. Ensure adequate hydration. Orthostatic precautions.       # Tachycardia with irregular rhythm/A flutter: occurred during PT session on 2/21 while on tilt table; tachycardia is multifactorial as orthostatic hypotension. Spoke with Dr. Lange hospitalist, appreciated assistance. Repeat BMP wnl today; plan is to obtain cortisol level in am and continue to monitor him clinically for now.  Follow orthostatic precautions. Asymptomatic at this point. 2/22 repeat EKG with atrial flutter and variable block. Cardiology team was consulted; appreciate assistance. Recommended to continue current regimen with ASA and plavix and f/u as outpatient to review the plan for anticoagulation. 2/24 repeat TTE showed AFib and reduced EF; atenolol was changed to metoprolol for better rate control.     --now in NSR  --continue metoprolol 50 bid    # HLD: on lipitor 40 daily. LDL was 48 in 5/2014 when last checked.      # CAD s/p CABG in 1995 with HFrEF (EF 40-45%):   # Acute systolic CHF:  no active issues upon admission. Had issues with orthostatic hypotension and A flutter as above. 2/24 repeat TTE showed worsening of his cardiac function. Per cardiology note, \"suspect EF is worse b/c tachycardia mediated from aflutter. Other possibility is stress induced. Less likely ischemic\". Was started on lisinopril 2.5mg daily, which was increased to 5mg daily on 2/28.  He also developed b/l pleural effusion following a blood transfusion on Sat 2/25; presented with acute onset SOB. Was started on lasix with some clinical improvement since then.     --continue ASA, lipitor and atenolol  --continue lisinopril 5mg daily; will titrate gradually to 10mg daily if BP remains > 120/80 per recs   --continue lasix 20mg daily     # H/o CLL in remission: last chemo in 2015; is followed by oncology team - no active issues.        FEN: regular, mod consistent CHO.     Bowel: ? Neurogenic " bowel - on prn bowel meds upon admission with last BM on 2/14. Will monitor and adjust meds/bowel regimen as needed. 2/17 scheduled bowel meds. 2/20 had loose BMs over the weekend so meds were changed to prn; started on metformin which can be contributing as well. 2/28 regular daily BMs on current regimen.    Bladder: ? Neurogenic bladder - h/o BPH on flomax prior to admission; was not resumed after admission to the hospital likely due to low BP but restarted upon admission to ARU. PVRs elevated and was cathed once on 2/18. Plan is to minimize narcotic use, void in standing/sitting position as much as possible, and continue to monitor PVRs. 2/28 low PVRs.     DVT Prophylaxis: mechanical; per neurosurgery team notes Ok to start chemical prophylaxis on 2/16 - will clarify. Discussed and started lovenox 40 daily on 2/17. 2/21 dc'd lovenox per hospitalist team recs as he was started on plavix.     GI Prophylaxis: none, oral intake.     Code: full.     Disposition: home vs TCU pending progress and family support. Discussed in team rounds. Care conf 3/1.    ELOS:  3-4 weeks.     Follow up Appointments on Discharge: ID, neurosurgery, PCP and PM&R. + cardiology    Follow-up with neurosurgery, Dr. Marcelo Trent MD in 2 weeks for wound check.       Follow-up with Infectious Disease, Dr. Gillian Vila, on 3/23/17. If you have not heard back regarding your appointment time please call [phone] and ask to page the Infectious Disease fellow regarding your pre-planned appointment with Dr. Vila on 3/23/17 to assess antibiotic regimen and progress    Hyun Koehler MD  Physical Medicine & Rehabilitation    I spent a total of 30 minutes face-to-face or managing the care of Tad Manning. Over 50% of my time on the unit was spent counseling the patient and coordinating care. See note for details.

## 2017-02-28 NOTE — PLAN OF CARE
Problem: Goal/Outcome  Goal: Goal Outcome Summary  Outcome: No Change  Patient awake at the start of this shift, he complained of difficulty falling asleep after taking sleeping medication, when writer asked what is patient normal routine during the night, he reported that usually he leaves the lights and TV on and has no problem falling asleep. Patient reported of back pain, refused to reposition to his right side, back incision open to air and sutures intact, some redness noted around incision. Prn Ultram 50 mg given with good pain control, patient received schedule IV antibiotic, infused in right arm PICC, patient blood sugar for 0200 was 117. Patient finally feel asleep around 0230. Urinal at bedside and call light is with in reach, patient remains alert and able to make needs known. Continue to monitor.  Patient awake by 0500, is in no apparent distress

## 2017-03-01 LAB
ANION GAP SERPL CALCULATED.3IONS-SCNC: 6 MMOL/L (ref 3–14)
BUN SERPL-MCNC: 10 MG/DL (ref 7–30)
CALCIUM SERPL-MCNC: 7.9 MG/DL (ref 8.5–10.1)
CHLORIDE SERPL-SCNC: 107 MMOL/L (ref 94–109)
CO2 SERPL-SCNC: 27 MMOL/L (ref 20–32)
CREAT SERPL-MCNC: 0.55 MG/DL (ref 0.66–1.25)
GFR SERPL CREATININE-BSD FRML MDRD: ABNORMAL ML/MIN/1.7M2
GLUCOSE BLDC GLUCOMTR-MCNC: 101 MG/DL (ref 70–99)
GLUCOSE BLDC GLUCOMTR-MCNC: 130 MG/DL (ref 70–99)
GLUCOSE BLDC GLUCOMTR-MCNC: 174 MG/DL (ref 70–99)
GLUCOSE BLDC GLUCOMTR-MCNC: 83 MG/DL (ref 70–99)
GLUCOSE SERPL-MCNC: 94 MG/DL (ref 70–99)
NT-PROBNP SERPL-MCNC: 8273 PG/ML (ref 0–900)
POTASSIUM SERPL-SCNC: 3.6 MMOL/L (ref 3.4–5.3)
SODIUM SERPL-SCNC: 140 MMOL/L (ref 133–144)

## 2017-03-01 PROCEDURE — 25000128 H RX IP 250 OP 636: Performed by: INTERNAL MEDICINE

## 2017-03-01 PROCEDURE — A9270 NON-COVERED ITEM OR SERVICE: HCPCS | Mod: GY | Performed by: PHYSICAL MEDICINE & REHABILITATION

## 2017-03-01 PROCEDURE — 97530 THERAPEUTIC ACTIVITIES: CPT | Mod: GP | Performed by: PHYSICAL THERAPIST

## 2017-03-01 PROCEDURE — 40000187 ZZH STATISTIC PATIENT MED CONFERENCE < 30 MIN: Performed by: SPEECH-LANGUAGE PATHOLOGIST

## 2017-03-01 PROCEDURE — 00000146 ZZHCL STATISTIC GLUCOSE BY METER IP

## 2017-03-01 PROCEDURE — 97535 SELF CARE MNGMENT TRAINING: CPT | Mod: GO

## 2017-03-01 PROCEDURE — 25000132 ZZH RX MED GY IP 250 OP 250 PS 637: Mod: GY | Performed by: INTERNAL MEDICINE

## 2017-03-01 PROCEDURE — A9270 NON-COVERED ITEM OR SERVICE: HCPCS | Mod: GY | Performed by: INTERNAL MEDICINE

## 2017-03-01 PROCEDURE — 40000225 ZZH STATISTIC SLP WARD VISIT: Performed by: SPEECH-LANGUAGE PATHOLOGIST

## 2017-03-01 PROCEDURE — 97532 ZZHC SP COGNITIVE SKILLS EA 15 MIN: CPT | Mod: GN | Performed by: SPEECH-LANGUAGE PATHOLOGIST

## 2017-03-01 PROCEDURE — 97150 GROUP THERAPEUTIC PROCEDURES: CPT | Mod: GO

## 2017-03-01 PROCEDURE — 25000125 ZZHC RX 250: Performed by: PHYSICAL MEDICINE & REHABILITATION

## 2017-03-01 PROCEDURE — 36592 COLLECT BLOOD FROM PICC: CPT | Performed by: INTERNAL MEDICINE

## 2017-03-01 PROCEDURE — 40000193 ZZH STATISTIC PT WARD VISIT: Performed by: PHYSICAL THERAPIST

## 2017-03-01 PROCEDURE — 99366 TEAM CONF W/PAT BY HC PROF: CPT

## 2017-03-01 PROCEDURE — 97110 THERAPEUTIC EXERCISES: CPT | Mod: GP | Performed by: PHYSICAL THERAPIST

## 2017-03-01 PROCEDURE — 40000133 ZZH STATISTIC OT WARD VISIT

## 2017-03-01 PROCEDURE — 25000132 ZZH RX MED GY IP 250 OP 250 PS 637: Mod: GY | Performed by: PHYSICAL MEDICINE & REHABILITATION

## 2017-03-01 PROCEDURE — 83880 ASSAY OF NATRIURETIC PEPTIDE: CPT | Performed by: INTERNAL MEDICINE

## 2017-03-01 PROCEDURE — 99232 SBSQ HOSP IP/OBS MODERATE 35: CPT | Performed by: INTERNAL MEDICINE

## 2017-03-01 PROCEDURE — 12800006 ZZH R&B REHAB

## 2017-03-01 PROCEDURE — 80048 BASIC METABOLIC PNL TOTAL CA: CPT | Performed by: INTERNAL MEDICINE

## 2017-03-01 PROCEDURE — 99366 TEAM CONF W/PAT BY HC PROF: CPT | Performed by: PHYSICAL THERAPIST

## 2017-03-01 RX ADMIN — CLOPIDOGREL BISULFATE 75 MG: 75 TABLET, FILM COATED ORAL at 08:22

## 2017-03-01 RX ADMIN — SODIUM CHLORIDE, PRESERVATIVE FREE 10 ML: 5 INJECTION INTRAVENOUS at 14:24

## 2017-03-01 RX ADMIN — TRAMADOL HYDROCHLORIDE 50 MG: 50 TABLET, FILM COATED ORAL at 21:46

## 2017-03-01 RX ADMIN — RANITIDINE HYDROCHLORIDE 150 MG: 150 TABLET, FILM COATED ORAL at 08:23

## 2017-03-01 RX ADMIN — RAMELTEON 8 MG: 8 TABLET, FILM COATED ORAL at 21:45

## 2017-03-01 RX ADMIN — ACETAMINOPHEN 1000 MG: 500 TABLET, FILM COATED ORAL at 08:23

## 2017-03-01 RX ADMIN — CEFTRIAXONE 2 G: 2 INJECTION, POWDER, FOR SOLUTION INTRAMUSCULAR; INTRAVENOUS at 00:48

## 2017-03-01 RX ADMIN — ASPIRIN 325 MG ORAL TABLET 325 MG: 325 PILL ORAL at 08:22

## 2017-03-01 RX ADMIN — LISINOPRIL 5 MG: 5 TABLET ORAL at 08:56

## 2017-03-01 RX ADMIN — SODIUM CHLORIDE, PRESERVATIVE FREE 5 ML: 5 INJECTION INTRAVENOUS at 06:40

## 2017-03-01 RX ADMIN — METOPROLOL TARTRATE 50 MG: 50 TABLET, FILM COATED ORAL at 08:22

## 2017-03-01 RX ADMIN — ATORVASTATIN CALCIUM 40 MG: 40 TABLET, FILM COATED ORAL at 08:23

## 2017-03-01 RX ADMIN — FUROSEMIDE 20 MG: 20 TABLET ORAL at 08:23

## 2017-03-01 RX ADMIN — SODIUM CHLORIDE, PRESERVATIVE FREE 10 ML: 5 INJECTION INTRAVENOUS at 21:45

## 2017-03-01 RX ADMIN — CEFTRIAXONE 2 G: 2 INJECTION, POWDER, FOR SOLUTION INTRAMUSCULAR; INTRAVENOUS at 12:18

## 2017-03-01 RX ADMIN — ACETAMINOPHEN 1000 MG: 500 TABLET, FILM COATED ORAL at 21:45

## 2017-03-01 RX ADMIN — RANITIDINE HYDROCHLORIDE 150 MG: 150 TABLET, FILM COATED ORAL at 21:45

## 2017-03-01 RX ADMIN — Medication 25 MG: at 21:45

## 2017-03-01 RX ADMIN — ACETAMINOPHEN 1000 MG: 500 TABLET, FILM COATED ORAL at 14:23

## 2017-03-01 RX ADMIN — INSULIN ASPART 1 UNITS: 100 INJECTION, SOLUTION INTRAVENOUS; SUBCUTANEOUS at 17:15

## 2017-03-01 RX ADMIN — OXYCODONE HYDROCHLORIDE AND ACETAMINOPHEN 500 MG: 500 TABLET ORAL at 08:23

## 2017-03-01 RX ADMIN — VITAMIN D, TAB 1000IU (100/BT) 1000 UNITS: 25 TAB at 08:23

## 2017-03-01 RX ADMIN — METFORMIN HYDROCHLORIDE 500 MG: 500 TABLET ORAL at 08:23

## 2017-03-01 RX ADMIN — TAMSULOSIN HYDROCHLORIDE 0.8 MG: 0.4 CAPSULE ORAL at 21:45

## 2017-03-01 RX ADMIN — METOPROLOL TARTRATE 50 MG: 50 TABLET, FILM COATED ORAL at 21:46

## 2017-03-01 RX ADMIN — METFORMIN HYDROCHLORIDE 500 MG: 500 TABLET ORAL at 17:16

## 2017-03-01 RX ADMIN — SODIUM CHLORIDE, PRESERVATIVE FREE 5 ML: 5 INJECTION INTRAVENOUS at 01:24

## 2017-03-01 NOTE — PLAN OF CARE
Problem: Goal/Outcome  Goal: Goal Outcome Summary  FOCUS/GOAL  Bowel management, Bladder management, Pain management, Medical management, Mobility and Skin integrity     ASSESSMENT, INTERVENTIONS AND CONTINUING PLAN FOR GOAL:  Pt is alert and oriented. Incontinent of urine x 1, continent x 1. Incontinent of smear of stool x 2. Denied pain. Turned and repositioned for comfort. Sutures removed from spinal incision previous shift. Right double lumen PICC is patent and intact, dressing due to be changed 3/1. Slept well between cares. 0200 blood glucose 83. Uses call light appropriately, able to make needs known. Will continue with POC.

## 2017-03-01 NOTE — PLAN OF CARE
Problem: Goal/Outcome  Goal: Goal Outcome Summary  Outcome: Improving  Patient is A&O x 3. Denies pain VS'S. CMS intact.PICC is patent with flushes. All sutures removed from back incision,4 steri strips applied were was needed.Uses a urinal and staff empties.. Continent of bladder and bowel. LB 2/26. Nursing will continue to monitor,

## 2017-03-01 NOTE — CARE CONFERENCE
Acute Rehab Care Conference/Team Rounds      Type: Patient Conference    Present: Dr Hyun Koehler, Chelsea Mosqueda [facility], Tad Manning patient, Anali Negron PT, Lexus Saenz OT, Chelsea Butler SLP, Mitchell Maria RN.      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 PMH significant for DM with peripheral neuropathy, HTN, HLD, CAD s/p CABG, systolic and diastolic heart failure, A flutter, orthostatic hypotension, and CLL in remission.     Safety: Uses call light appropriately, but is a fall risk    Pain: denies, but is on scheduled Tylenol    Medications: evaluate for MAP  Skin: spinal incision, sutures removed 2/28. Coccyx pink, intact  Tubes/Lines: Right double lumen PICC, patent and intact. Dressing due to be changed 3/1    Swallowing/Nutrition:  SLP did not follow Pt for swallowing. Pt tolerating Regular/thin diet.    Bowel/Bladder: was incontinent lg amt of urine and small stool overnight. Usually uses urinal.    Psychosocial: Pt resides with his SO. Pts goal to return home with continued family support and ongoing therapy. Team working towards a safe d/c plan.    ADLs/IADLs: Patient progressing nicely now that medical concerns stabilizing. Able to complete FB dressing with set up assist with us of AE while supine and progressing towards seated. Patient able to complete SB transfer with CGA and set up bed <> w/c and w/c <> BSC, but continues to require assist with clothing management and napoleon-cares. Patient able to self propel w/c household distances and into bathroom. Will require AE at d/c: tub transfer bench, BSC, grab bars, slide board and bed rail.  Recommend assist with IADLs, meal prep, laundry, medication management, money management, and  transportation.     Mobility: w/c based at this time, LE strength improving; has not ambulated but has progressed to standing in //. Remains sliding board based for transfers and continues to require assist w/ bed mobility.  Anticipate longer length of stay for him to be able to be ind w/ mobility.  He does not feel he can go home w/c based.  Household level ambulation many weeks away.  Recommend ongoing IP care at this time.     Cognition/Language:  Language goals met. Pt has steadily progressed in his goals for cognition for complex problem solving/reasoning, divided attention, and recent memory.  Pt currently uses strategies of rehearsal and repetitions (i.e., reading information 2x or repeating 2x) to aid in recall, currently at 90% accuracy with these tasks. Pt uses a memory notebook to record important information.  Pt is demonstrating % accuracy with complex reasoning and divided attention tasks but does need increased time to complete.  Pt is appearing close to baseline for cognition, will plan to decrease intensity to 30 minutes, 1x/day starting on 3/2 and likely d/c from SLP in next couple days.  No further SLP needs anticipated after d/c.       Community Re-Entry: w/c based initially w/ assist    Transportation: Patient not a  at this time due to LE weakness; car transfer not yet trialed but do not anticipate it to be a barrier    Decision maker: self    Plan of Care and goals reviewed and updated.    Discharge Plan/Recommendations    Fall Precautions: continue    Overall plan for the patient: Long discussion with Tad, and his family today (brother and sister-in-law as well as his SO and niece over the phone) regarding his medical issues, functional deficits, rehab course and anticipated outcome. His participation was initially very limited due to complicated medical issues including orthostatic hypotension, A flutter (new diagnosis), acute systolic heart failure, b/l pleural effusion and  insomnia/anxiety. He has made measurable improvement over the past 3-4 days when he remained more medically stable. We anticipated that over the next 2 week course, he can potentially go home and be mod I with mobility and basic ADLs at  level. Will work with his SO to see if his apartment is  accessible and also consider a home eval if needed. Nursing shoulder work on insulin injections, MAP and setting up home infusion for his IV antibiotics. If goes home, he will have home PT, OT, RN and HHA; no SLP needs at discharge.     Utilization Review and Continued Stay Justification    Medical Necessity Criteria:    For any criteria that is not met, please document reason and plan for discharge, transfer, or modification of plan of care to address.    Requires intensive rehabilitation program to treat functional deficits?: Yes    Requires 3x per week or greater involvement of rehabilitation physician to oversee rehabilitation program?: Yes    Requires rehabilitation nursing interventions?: Yes    Patient is making functional progress?: Yes    There is a potential for additional functional progress? Yes    Patient is participating in therapy 3 hours per day a minimum of 5 days per week or 15 hours per week in 7 day period?:Yes    Has discharge needs that require coordinated discharge planning approach?:Yes        Final Physician Sign off    Statement of Approval: I agree with all the recommendations detailed in this document.      Patient Goals    OT target date for goal attainment: 03/16/17  OT Frequency: daily  min  OT goal: hygiene/grooming: independent  OT goal: upper body dressing: Independent, including set-up/clothing retrieval, within precautions  OT goal: lower body dressing: Modified independent, using adaptive equipment, including set-up/clothing retrieval  OT goal: upper body bathing: Supervision/stand-by assist, within precautions  OT goal: lower body bathing: Supervision/stand-by assist, with  precautions (seated)  OT Goal: transfer: Modified independent, with assistive device  OT goal: toilet transfer/toileting: Modified independent, cleaning and garment management, toilet transfer, within precautions  OT goal: meal preparation: Modified independent, with simple meal preparation, within precautions (Wc based pending progress with amb)  OT goal: cognitive: Patient/caregiver will verbalize understanding of cognitive assessment results/recommendations as needed for safe discharge planning  OT goal 1: Pt will be able to complete ADL and functiopnal mobility with useopf spinal precautions without cues for following precautions and restrictions.   OT goal 2: Pt will be I with UB HEP to impriove UB strength for functional mobiltiy, transfer, and bed mobility    PT target date for goal attainment: 03/16/17  PT Frequency: daily x 60 minutes  PT goal: bed mobility: Modified independent, Supine to/from sit, Rolling, Bridging, Within precautions  PT goal: transfers: Modified independent, Sit to/from stand, Bed to/from chair, Assistive device, Within precautions  PT goal: gait:  (w/c vs amb TBD)     PT goal: perform aerobic activity with stable cardiovascular response: 15 minutes, NuStep, continuous activity     PT goal 1: Patient will perform car transfer Mod I.   PT Goal 2: Patient will propel wheelchair Mod I x 300' in order to access the community.   PT Goal 3: Patient will be independent with HEP for strengthening and ROM in order to continuing working towards strength in home setting.   PT Goal 4: Patient will attend falls class in order to improve safety awareness in the home setting to prevent falls.             SLP Frequency: daily for 60 minutes per day  SLP goal 1:  (goal met 2/15/17)  SLP goal 2:  (goal met 2/26/17)  SLP goal 3: Pt will utilize internal and external memory strategies to recall 3 bits of information after 15 minutes lapse of time over three sessions  SLP goal 4: Pt will complete moderate  to complex level deductive /numerical reasoning and problem solving tasks with 90% accuracy and minimal prompts and cues.          Patient Goal:  Pain Management: Pain is controlled with current pain medication regment, he is on scheduled Tylenol.   Goal: Skin Integrity: No skin issues at this time, sutures removed off surgical incision on back.   Goal: Reinforcement of self care/ADL: He is working with OT to maximize self care  Goal: Dress/Undress: He is working with OT re this goal.

## 2017-03-01 NOTE — PLAN OF CARE
Problem: Goal/Outcome  Goal: Goal Outcome Summary  Outcome: Therapy, progress toward functional goals as expected  Pt had a care conference, dc plan is in about 2 weeks to home. He has PLC for PICC line care on 3/3/17 at 2.30,  he is due for PICC line dressing change this evening. Referral is made to [facility], message left, we need to follow up with this. Pt is going to need diabetic education as he is new to insulin. Back incision is open to air, it needs to be monitored closely. For more details, see rounds and care conference note from 3/1/17

## 2017-03-01 NOTE — PLAN OF CARE
Problem: Goal/Outcome  Goal: Goal Outcome Summary  Outcome: Improving  SLP: see care conference note - pt needed min assist with moderately complex verbal reasoning task, at times needing cues to think more abstractly and using deduction.

## 2017-03-01 NOTE — PLAN OF CARE
Problem: Goal/Outcome  Goal: Goal Outcome Summary  [place] [facility] [place] [phone]

## 2017-03-01 NOTE — PROGRESS NOTES
[facility]   Acute Rehabilitation Unit  Daily progress note    interval history  Tad Manning was seen and examined at bedside. Slept ok last night; sleep was interrupted but longer duration at a time. Denies any pain or discomfort. Spoke with hospitalist team and dc'd BG check at 2 am.    Formal care conference held today; please see separate document in Plan of Care tab for full details. Long discussion with Tad, and his family today (brother and sister-in-law as well as his SO and niece over the phone) regarding his medical issues, functional deficits, rehab course and anticipated outcome. His participation was initially very limited due to complicated medical issues including orthostatic hypotension, A flutter (new diagnosis), acute systolic heart failure, b/l pleural effusion and insomnia/anxiety. He has made measurable improvement over the past 3-4 days when he remained more medically stable. We anticipated that over the next 2 week course, he can potentially go home and be mod I with mobility and basic ADLs at  level. Will work with his SO to see if his apartment is  accessible and also consider a home eval if needed. Nursing shoulder work on insulin injections, MAP and setting up home infusion for his IV antibiotics. If goes home, he will have home PT, OT, RN and HHA; no SLP needs at discharge.         medications    [START ON 3/2/2017] insulin glargine  18 Units Subcutaneous QAM AC     lisinopril  5 mg Oral Daily     ranitidine  150 mg Oral BID     furosemide  20 mg Oral Daily     metoprolol  50 mg Oral BID     ramelteon  8 mg Oral At Bedtime     metFORMIN  500 mg Oral BID w/meals     clopidogrel  75 mg Oral Daily     acetaminophen  1,000 mg Oral TID     ascorbic acid (VITAMIN C) tablet 500 mg  500 mg Oral Daily     aspirin  325 mg Oral Daily     atorvastatin  40 mg Oral Daily     cefTRIAXone  2 g Intravenous Q12H     cholecalciferol  1,000 Units Oral  Daily     tamsulosin  0.8 mg Oral At Bedtime     insulin aspart  1-7 Units Subcutaneous TID AC     insulin aspart  1-5 Units Subcutaneous At Bedtime     heparin lock flush  5-10 mL Intracatheter Q24H     QUEtiapine, diphenhydrAMINE **OR** diphenhydrAMINE, tramadol, senna-docusate, polyethylene glycol, bisacodyl, - MEDICATION INSTRUCTIONS -, lidocaine, glucose **OR** dextrose **OR** glucagon, naloxone, sodium chloride (PF), heparin lock flush       physical exam  /68 (BP Location: Left arm)  Pulse 87  Temp 96.2  F (35.7  C) (Oral)  Resp 16  Ht 1.829 m (6')  Wt 87.1 kg (192 lb 1.6 oz)  SpO2 96%  BMI 26.05 kg/m2     Gen: NAD, resting in bed   HEENT: has glasses   Pulm: non-labored   Neuro/MSK: unchanged   alert and oriented, strength 3/5 HF, 4-/5 KE/KF, 4/5 DF, EHL and PF b/l, paresthesia at plantar aspect of feet b/l     Skin: not examined today  thoracic surgical incision with sutures in place and mild surrounding erythema - superficially open areas with no s/s of infection       labs  BG in good range     assessment and plan  Tad Manning is a 76 year old right hand dominant male with 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 Medical co-morbidities include post-op pain, anemia, hyponatremia, and h/o bacteremia on IV antibiotic. PMH significant for DM with peripheral neuropathy, HTN, HLD, CAD s/p CABG, systolic and diastolic heart failure, and CLL in remission.      Functional deficits are weakness in bilateral lower extremities, sensory loss in feet (due to diabetic neuropathy but worse likely due to SCI), neurogenic bladder and bowel, and acute on chronic deconditioning with poor endurance.         Admission to acute inpatient rehab 2/15.     Rehabilitation: please see interval history for updates. Ordered SLP on 2/16 given his low score on SLUMS 23/30.      Medical Conditions; appreciate hospitalist team  assistance in medical management.      # Incomplete SCI:  # Thoracic osteomyelitis/discitis with epidural abscess:  # S/p T9-10 laminectomy, T7-12 posterior arthrodesis and TF right sided T9-10 fusion.   # Strep mitis bacteremia:      --TLSO for comfort  --wound care per recs; will remove sutures at 2 weeks higinio ~ 2/26. Removed on 2/28.  --PICC line care on the right arm   -nurse reported green discharge 2/22, no clinical s/s of infection - will monitor   --pain management with prn tylenol, tramadol, and oxycodone   -2/19 tylenol was scheduled to help with pain and minimize narcotic use. Also tramadol was added to again minimize oxycodone intake due to side effects like urinary retention.    -2/22 requiring tramadol and oxycodone x1-2/day; will monitor for one more day and dc oxycodone.    -2/24 dc'd oxy.    --continue IV rocephin for total of 6 weeks - end date 3/24  --should f/u with ID and neurosurgery teams as outpatient      ** Check CBC, CRP, creatinine, and LFTs weekly while on therapy and have results faxed to the [facility] at [phone]. - faxed on 2/21 and 2/28.     Hyponatremia: resolved since 2/12 and stable ~ 131. 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 Repeat labs 2/20 Na 137.       Anemia: likely due to acute illness and blood loss; stable since his surgery. Will monitor clinically and repeat labs as needed.     Anxiety: patient reports history of occasional nighttime anxiety associated with inability to sleep prior to this admission. In the past, he has taken Zolpidem prn for this. Now complains of three consecutive nights of anxiety. Was given 12.5mg seroquel 2/22, patient says he thinks it helped.  --started scheduled ramelteon and prn seroquel on 2/23  --discussed with pt and ordered rehab psychology consult      # DM type II with neuropathy: on glipizide and victoza injections prior to admission. HgbA1C was 6.1% on 2/10. Currently on lantus 12 units daily  "and SSI. 2/17 lantus was increased to 14 for better control. 2/21 lantus increased to 20 daily and also was started on metformin 500 bid. 2/28 not requiring SSI.      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 Continue ASA; plavix restarted on 2/20.     # HTN: on amlodipine, atenolol, lisinopril and prinzide (?) prior to admission. Currently on atenolol 25 daily; BP has been variable with new arrhythmia - see below.    # Orthostatic hypotension: as confirmed by PT on 2/17. Multifactorial due to anemia, severe deconditioning and possibly autonomic instability due SCI. Should have TEDs and abdominal binder before getting out of bed. Ensure adequate hydration. Orthostatic precautions.       # Tachycardia with irregular rhythm/A flutter: occurred during PT session on 2/21 while on tilt table; tachycardia is multifactorial as orthostatic hypotension. Spoke with Dr. Lange hospitalist, appreciated assistance. Repeat BMP wnl today; plan is to obtain cortisol level in am and continue to monitor him clinically for now.  Follow orthostatic precautions. Asymptomatic at this point. 2/22 repeat EKG with atrial flutter and variable block. Cardiology team was consulted; appreciate assistance. Recommended to continue current regimen with ASA and plavix and f/u as outpatient to review the plan for anticoagulation. 2/24 repeat TTE showed AFib and reduced EF; atenolol was changed to metoprolol for better rate control.     --now in NSR  --continue metoprolol 50 bid, ASA and plavix   --f/u as outpatient to review the plan for anticoagulation    # HLD: on lipitor 40 daily. LDL was 48 in 5/2014 when last checked.      # CAD s/p CABG in 1995 with HFrEF (EF 40-45%):   # Acute systolic CHF:  no active issues upon admission. Had issues with orthostatic hypotension and A flutter as above. 2/24 repeat TTE showed worsening of his cardiac function. Per cardiology note, \"suspect " "EF is worse b/c tachycardia mediated from aflutter. Other possibility is stress induced. Less likely ischemic\". Was started on lisinopril 2.5mg daily, which was increased to 5mg daily on 2/28.  He also developed b/l pleural effusion following a blood transfusion on Sat 2/25; presented with acute onset SOB. Was started on lasix with some clinical improvement since then.     --continue ASA, lipitor and atenolol  --continue lisinopril 5mg daily; will titrate gradually to 10mg daily if BP remains > 120/80 per recs   --continue lasix 20mg daily     # H/o CLL in remission: last chemo in 2015; is followed by oncology team - no active issues.        FEN: regular, mod consistent CHO.     Bowel: ? Neurogenic bowel - on prn bowel meds upon admission with last BM on 2/14. Will monitor and adjust meds/bowel regimen as needed. 2/17 scheduled bowel meds. 2/20 had loose BMs over the weekend so meds were changed to prn; started on metformin which can be contributing as well. 2/28 regular daily BMs on current regimen.    Bladder: ? Neurogenic bladder - h/o BPH on flomax prior to admission; was not resumed after admission to the hospital likely due to low BP but restarted upon admission to ARU. PVRs elevated and was cathed once on 2/18. Plan is to minimize narcotic use, void in standing/sitting position as much as possible, and continue to monitor PVRs. 2/28 low PVRs.     DVT Prophylaxis: mechanical; per neurosurgery team notes Ok to start chemical prophylaxis on 2/16 - will clarify. Discussed and started lovenox 40 daily on 2/17. 2/21 dc'd lovenox per hospitalist team recs as he was started on plavix.     GI Prophylaxis: none, oral intake.     Code: full.     Disposition: home vs TCU pending progress and family support. Discussed in team rounds. Care conf 3/1.    ELOS:  3-4 weeks.     Follow up Appointments on Discharge: ID, neurosurgery, PCP and PM&R. + cardiology    Follow-up with neurosurgery, Dr. Marcelo Trent MD in 2 weeks " for wound check.       Follow-up with Infectious Disease, Dr. Gillian Vila, on 3/23/17. If you have not heard back regarding your appointment time please call [phone] and ask to page the Infectious Disease fellow regarding your pre-planned appointment with Dr. Vila on 3/23/17 to assess antibiotic regimen and progress    Hyun Koehler MD  Physical Medicine & Rehabilitation    I spent a total of 60 minutes face-to-face or managing the care of Tad Manning. Over 50% of my time on the unit was spent counseling the patient and coordinating care. See note for details.

## 2017-03-01 NOTE — PROGRESS NOTES
"CLINICAL NUTRITION SERVICES - REASSESSMENT NOTE     Nutrition Prescription    RECOMMENDATIONS FOR MDs/PROVIDERS TO ORDER:  None at this time.    Malnutrition Status:    Severe malnutrition in the context of acute illness.    Recommendations already ordered by Registered Dietitian (RD):  Continue Ensure Plus BID.    Future/Additional Recommendations:  Continue to follow wt trends.     EVALUATION OF THE PROGRESS TOWARD GOALS   Diet: Moderate Consistent Carbohydrate, 1639-6984 kcal, Thin Liquids, Ensure BID with meals (vanilla at lunch, strawberry at dinner)    Intake: Pt states he's eating well, taste is starting to return. Pt was just finishing lunch when writer arrived, and had had a roast beef sandwich and oven fries that he said were \"pretty good\" and that the sandwich in particular was the first thing he's found on the menu that is consistently good -- states that other meats he's tried were cold and dry. Pt was also working on an Ensure Plus during our visit. Nsg notes pt taking foods and fluids well, flowsheets show pt with fair appetite and consuming mostly % of meals. A 3-day summary of meals ordered through [employer] shows an average of 1634 kcals/d (~70% of assessed needs) and 68 g protein/d (~60% of assessed needs). Writer encouraged pt to continue his good work at eating, and try to get a little more in whenever he feels that he can.     NEW FINDINGS   -Weight:  -- 87.1 kg (192 lb 1.6 oz) -- wt seems stable now in +/- 2 kg range. Pt has lost 13 kg (13% BW) since 10/27/16.  -Skin: , per WOCN note -- Moisture associated dermatitis to perineum: improving erythema and exfoliating epidermis in the immediate area encirciling buttocks just above hilton cleft  -Meds: On lasix since   -Endocrine: BGM --      MALNUTRITION  % Intake: </=75% for >/= 1 month (severe)  % Weight Loss: 13% weight loss in 3 months (severe)  Subcutaneous Fat Loss: None observed  Muscle Loss: Thoracic region " (clavicle, acromium bone, deltoid, trapezius, pectoral), and Upper arm (bicep, tricep): Mild/moderate  Fluid Accumulation/Edema: Trace (1+) per ns flowsheets on 2/27  Malnutrition Diagnosis: Severe malnutrition in the context of acute illness.       Previous Goals   1. Patient to consume % of nutritionally adequate meal trays TID, or the equivalent with supplements/snacks.  Evaluation: Not met, but improving    2. Weight maintenance--pending recheck.  Evaluation: Met    Previous Nutrition Diagnosis  Inadequate oral intake related to decreased appetite as evidenced by variable intake of 25%-100% of recorded meals over past week, not consistently taking supplements and possible continued weight loss.  Evaluation: Improving    CURRENT NUTRITION DIAGNOSIS  Inadequate oral intake related to decreased appetite as evidenced by intake meeting <75% of needs over past week, not consistently taking supplements.    INTERVENTIONS  Implementation  Continue Ensure Plus with meals.  Education: Encouraged pt to continue making good strides in his intake whenever he can.    Goals  Patient to consume % of nutritionally adequate meal trays TID, or the equivalent with supplements/snacks.    Monitoring/Evaluation  Progress toward goals will be monitored and evaluated per protocol.      -----------------------------  Crystal Whiting  Dietetic Intern  [facility]  pager: [phone]

## 2017-03-01 NOTE — PROGRESS NOTES
[facility], [place]   Hospitalist Daily Progress Note    17                                                   Date of Admission:2/15/2017  ___________________________________  INTERVAL HISTORY (24 Hrs)/SUBJECTIVE:   Last 24 hr events, care team notes reviewed.     Says doing well  No cp or sob or palpitations or LH  No fever or chills  No NV       ROS: 4 point ROS neg other than the symptoms noted above in the interval history.    OBJECTIVE :   VITALS:    Temp:  [96.4  F (35.8  C)-97  F (36.1  C)] 96.4  F (35.8  C)  Pulse:  [78-84] 78  Heart Rate:  [82] 82  Resp:  [16] 16  BP: (133-144)/(64-74) 138/73  SpO2:  [95 %-96 %] 95 %    Temp (24hrs), Av.3  F (35.7  C), Min:96.2  F (35.7  C), Max:96.4  F (35.8  C)    Wt Readings from Last 5 Encounters:   17 87.1 kg (192 lb 1.6 oz)   17 81.2 kg (179 lb)   17 85.7 kg (188 lb 15 oz)   16 95.3 kg (210 lb)   16 95.3 kg (210 lb)         PHYSICAL EXAM:  General: alert, interactive, NAD  HEENT: AT/NC, Moist MM  Respi/Chest: Non labored. Clear bl  CVS/Heart: S1S2 regular,   Gi/Abd: Soft, non tender, non distended  Neuro: AO x3     Medications:   I have reviewed this patient's current medications.    Data:   All laboratory and imaging data in the past 24 hours reviewed:    LABS:  CMP    Recent Labs  Lab 17  0642 17  0534 17  0540 17  0553    141 139 139   POTASSIUM 3.6 3.7 3.6 3.4   CHLORIDE 107 106 105 105   CO2 27 28 25 26   ANIONGAP 6 7 9 8   GLC 94 97 126* 143*   BUN 10 12 13 11   CR 0.55* 0.63* 0.61* 0.54*   GFRESTIMATED >90Non  GFR Calc >90Non  GFR Calc >90Non  GFR Calc >90Non  GFR Calc   GFRESTBLACK >90African American GFR Calc >90African American GFR Calc >90African American GFR Calc >90African American GFR Calc   JUSTINO 7.9* 7.8* 8.0* 7.9*   MAG  --   --  1.7  --    PROTTOTAL  --  4.9*  --   --    ALBUMIN  --  1.9*   --   --    BILITOTAL  --  0.3  --   --    ALKPHOS  --  137  --   --    AST  --  19  --   --    ALT  --  22  --   --      CBC    Recent Labs  Lab 02/27/17  0534 02/25/17  1352 02/24/17  0553   WBC 5.0  --  5.6   RBC 3.07*  --  2.70*   HGB 8.3* 7.8* 7.4*   HCT 27.5*  --  24.2*   MCV 90  --  90   MCH 27.0  --  27.4   MCHC 30.2*  --  30.6*   RDW 17.4*  --  16.7*     --  224     INRNo lab results found in last 7 days.  Unresulted Labs Ordered in the Past 30 Days of this Admission     No orders found from 12/17/2016 to 2/16/2017.          No results found for this or any previous visit (from the past 24 hour(s)).    Echo 2/24:   Technically difficult study.The patient's rhythm is atrial fibrillation.    The left ventricle is severely dilated. Left ventricular systolic function is  severely reduced, ejection fraction is estimated at 25-30%. There is severe  global hypokinesis.  Right ventricular systolic function is mildly reduced.  The right ventricular systolic pressure is approximated at 15 mmHg plus the  right atrial pressure.   IVC is plethoric and estimated mean RA pressure is 15 mmHg.  No pericardial effusion present.         ASSESSMENT & PLAN :    # Acute systolic CHF: Started on Lasix 20 mg daily 2/26, strict I/O . Monitor   Clinically continues to feel better  Intermittent BMP.      # Orthostatic Dizziness and Tachycardia: worse on tilt table : improved  - Tachy w PAC's and fusion comlex on EKG 2/22: Atrial flutter with variable block 2/23.   - electrolytes stable : appreciate Cardiology consult  - Ct Orthostatic precautions   - as resting HR high and no change in BP: changed Atenolol to Metoprolol bid 2/24 with improvement. Increased to 50 mg bid 2/25  * Echo decreased LVEF and severe global hypokinesis (? Reason for new arrythmia)_ D/W Cardiology fellow 2/25   - added Lisinopril 2.5 mg 2/26 , increased to 5 mg daily 2/28/2017     Other:       T9-T10 diskitis, osteomyelitis with epidural abscess due to  Streptococcus mitis:   Continue ceftriaxone 2 grams IV every 12 hours for 6 weeks through 03/24/2017.   The patient should have a CBC, CRP, creatinine and LFTs done weekly.   Followup with Infectious Disease, Dr. Gillian Max on 03/23/2017.      Diabetes mellitus: Continue the Lantus at night along with sliding scale insulin.   Increased lantus to 20 units 2/19.   3/1/2017: decrease 18 Units.   Was on metformin at home , Started metformin 500 mg BID on 2/20     Recent Labs  Lab 03/01/17  1225 03/01/17  0642 03/01/17  0208 02/28/17  2123 02/28/17  1714 02/28/17  1204 02/28/17  0725  02/27/17  0534  02/26/17  0540  02/24/17  0553   GLC  --  94  --   --   --   --   --   --  97  --  126*  --  143*   *  --  83 148* 116* 157* 152*  < >  --   < >  --   < >  --    < > = values in this interval not displayed.  Monitor blood sugars      peripheral vascular disease, status post bilateral lower extremity stents:   Continue aspirin , Plavix  (started 2/20)   * Added Ranitidine for GI ppx- 2/27     # Chronic lymphocytic leukemia in remission, last chemo was 09/2015: Follow up with Oncology.   # Coronary artery disease: Continue aspirin 325 mg daily, Lipitor 40 mg daily, Metoprolol.   # Spinal cord compression with lower extremity weakness: plan per Primary team        Panchito Christine MD   Hospitalist (Internal Medicine)  Pager: [phone].

## 2017-03-02 LAB
GLUCOSE BLDC GLUCOMTR-MCNC: 113 MG/DL (ref 70–99)
GLUCOSE BLDC GLUCOMTR-MCNC: 123 MG/DL (ref 70–99)
GLUCOSE BLDC GLUCOMTR-MCNC: 139 MG/DL (ref 70–99)
GLUCOSE BLDC GLUCOMTR-MCNC: 93 MG/DL (ref 70–99)
GLUCOSE BLDC GLUCOMTR-MCNC: 98 MG/DL (ref 70–99)

## 2017-03-02 PROCEDURE — 12800006 ZZH R&B REHAB

## 2017-03-02 PROCEDURE — 97530 THERAPEUTIC ACTIVITIES: CPT | Mod: GP

## 2017-03-02 PROCEDURE — 97110 THERAPEUTIC EXERCISES: CPT | Mod: GP

## 2017-03-02 PROCEDURE — A9270 NON-COVERED ITEM OR SERVICE: HCPCS | Mod: GY | Performed by: PHYSICAL MEDICINE & REHABILITATION

## 2017-03-02 PROCEDURE — 25000125 ZZHC RX 250: Performed by: PHYSICAL MEDICINE & REHABILITATION

## 2017-03-02 PROCEDURE — 40000225 ZZH STATISTIC SLP WARD VISIT: Performed by: SPEECH-LANGUAGE PATHOLOGIST

## 2017-03-02 PROCEDURE — 25000132 ZZH RX MED GY IP 250 OP 250 PS 637: Mod: GY | Performed by: INTERNAL MEDICINE

## 2017-03-02 PROCEDURE — 97535 SELF CARE MNGMENT TRAINING: CPT | Mod: GO

## 2017-03-02 PROCEDURE — 00000146 ZZHCL STATISTIC GLUCOSE BY METER IP

## 2017-03-02 PROCEDURE — 25000128 H RX IP 250 OP 636: Performed by: INTERNAL MEDICINE

## 2017-03-02 PROCEDURE — 25000132 ZZH RX MED GY IP 250 OP 250 PS 637: Mod: GY | Performed by: PHYSICAL MEDICINE & REHABILITATION

## 2017-03-02 PROCEDURE — A9270 NON-COVERED ITEM OR SERVICE: HCPCS | Mod: GY | Performed by: INTERNAL MEDICINE

## 2017-03-02 PROCEDURE — 40000193 ZZH STATISTIC PT WARD VISIT

## 2017-03-02 PROCEDURE — 97532 ZZHC SP COGNITIVE SKILLS EA 15 MIN: CPT | Mod: GN | Performed by: SPEECH-LANGUAGE PATHOLOGIST

## 2017-03-02 PROCEDURE — 25000131 ZZH RX MED GY IP 250 OP 636 PS 637: Mod: GY | Performed by: INTERNAL MEDICINE

## 2017-03-02 PROCEDURE — 97116 GAIT TRAINING THERAPY: CPT | Mod: GP

## 2017-03-02 PROCEDURE — 40000133 ZZH STATISTIC OT WARD VISIT

## 2017-03-02 RX ORDER — MIRTAZAPINE 7.5 MG/1
15 TABLET, FILM COATED ORAL AT BEDTIME
Status: DISCONTINUED | OUTPATIENT
Start: 2017-03-02 | End: 2017-03-19 | Stop reason: HOSPADM

## 2017-03-02 RX ADMIN — SODIUM CHLORIDE, PRESERVATIVE FREE 5 ML: 5 INJECTION INTRAVENOUS at 15:21

## 2017-03-02 RX ADMIN — CEFTRIAXONE 2 G: 2 INJECTION, POWDER, FOR SOLUTION INTRAMUSCULAR; INTRAVENOUS at 13:10

## 2017-03-02 RX ADMIN — Medication 25 MG: at 21:26

## 2017-03-02 RX ADMIN — RANITIDINE HYDROCHLORIDE 150 MG: 150 TABLET, FILM COATED ORAL at 21:27

## 2017-03-02 RX ADMIN — SODIUM CHLORIDE, PRESERVATIVE FREE 5 ML: 5 INJECTION INTRAVENOUS at 21:23

## 2017-03-02 RX ADMIN — RANITIDINE HYDROCHLORIDE 150 MG: 150 TABLET, FILM COATED ORAL at 08:16

## 2017-03-02 RX ADMIN — ACETAMINOPHEN 1000 MG: 500 TABLET, FILM COATED ORAL at 21:24

## 2017-03-02 RX ADMIN — SODIUM CHLORIDE, PRESERVATIVE FREE 5 ML: 5 INJECTION INTRAVENOUS at 01:36

## 2017-03-02 RX ADMIN — FUROSEMIDE 20 MG: 20 TABLET ORAL at 08:17

## 2017-03-02 RX ADMIN — METFORMIN HYDROCHLORIDE 500 MG: 500 TABLET ORAL at 17:04

## 2017-03-02 RX ADMIN — CLOPIDOGREL BISULFATE 75 MG: 75 TABLET, FILM COATED ORAL at 08:16

## 2017-03-02 RX ADMIN — METOPROLOL TARTRATE 50 MG: 50 TABLET, FILM COATED ORAL at 21:20

## 2017-03-02 RX ADMIN — ASPIRIN 325 MG ORAL TABLET 325 MG: 325 PILL ORAL at 08:16

## 2017-03-02 RX ADMIN — LISINOPRIL 5 MG: 5 TABLET ORAL at 08:17

## 2017-03-02 RX ADMIN — INSULIN GLARGINE 18 UNITS: 100 INJECTION, SOLUTION SUBCUTANEOUS at 08:15

## 2017-03-02 RX ADMIN — CEFTRIAXONE 2 G: 2 INJECTION, POWDER, FOR SOLUTION INTRAMUSCULAR; INTRAVENOUS at 01:03

## 2017-03-02 RX ADMIN — ACETAMINOPHEN 1000 MG: 500 TABLET, FILM COATED ORAL at 13:10

## 2017-03-02 RX ADMIN — OXYCODONE HYDROCHLORIDE AND ACETAMINOPHEN 500 MG: 500 TABLET ORAL at 08:16

## 2017-03-02 RX ADMIN — ATORVASTATIN CALCIUM 40 MG: 40 TABLET, FILM COATED ORAL at 08:16

## 2017-03-02 RX ADMIN — METFORMIN HYDROCHLORIDE 500 MG: 500 TABLET ORAL at 08:16

## 2017-03-02 RX ADMIN — VITAMIN D, TAB 1000IU (100/BT) 1000 UNITS: 25 TAB at 08:16

## 2017-03-02 RX ADMIN — METOPROLOL TARTRATE 50 MG: 50 TABLET, FILM COATED ORAL at 08:17

## 2017-03-02 RX ADMIN — ACETAMINOPHEN 1000 MG: 500 TABLET, FILM COATED ORAL at 08:19

## 2017-03-02 RX ADMIN — TAMSULOSIN HYDROCHLORIDE 0.8 MG: 0.4 CAPSULE ORAL at 21:24

## 2017-03-02 RX ADMIN — MIRTAZAPINE 15 MG: 7.5 TABLET, FILM COATED ORAL at 21:24

## 2017-03-02 NOTE — PROGRESS NOTES
EMR reviewed.   B/P: 146/73, T: 97.1, P: 82, R: 20  Temp (24hrs), Av.7  F (35.9  C), Min:96.2  F (35.7  C), Max:97.1  F (36.2  C)    No new concern reported.   Labs reviewed.     Attempted to see patient x 3, not available in the room or busy.   No new rec  See my prior progress note for further recommendations.     Will kiersten.     Panchito Christine MD   Hospitalist (Internal Medicine)  Pager: [phone].       Recent Labs  Lab 17  1211 17  0736 17  0234 17  2115 17  1654 17  1225 17  0642  17  0534  17  0540  17  0553   GLC  --   --   --   --   --   --  94  --  97  --  126*  --  143*   BGM 98 113* 139* 101* 174* 130*  --   < >  --   < >  --   < >  --    < > = values in this interval not displayed.      LABS (Last four results)  CMP  Recent Labs  Lab 17  0642 17  0534 17  0540 17  0553    141 139 139   POTASSIUM 3.6 3.7 3.6 3.4   CHLORIDE 107 106 105 105   CO2 27 28 25 26   ANIONGAP 6 7 9 8   GLC 94 97 126* 143*   BUN 10 12 13 11   CR 0.55* 0.63* 0.61* 0.54*   GFRESTIMATED >90Non  GFR Calc >90Non  GFR Calc >90Non  GFR Calc >90Non  GFR Calc   GFRESTBLACK >90African American GFR Calc >90African American GFR Calc >90African American GFR Calc >90African American GFR Calc   JUSTINO 7.9* 7.8* 8.0* 7.9*   MAG  --   --  1.7  --    PROTTOTAL  --  4.9*  --   --    ALBUMIN  --  1.9*  --   --    BILITOTAL  --  0.3  --   --    ALKPHOS  --  137  --   --    AST  --  19  --   --    ALT  --  22  --   --      CBC  Recent Labs  Lab 17  0534 17  1352 17  0553   WBC 5.0  --  5.6   RBC 3.07*  --  2.70*   HGB 8.3* 7.8* 7.4*   HCT 27.5*  --  24.2*   MCV 90  --  90   MCH 27.0  --  27.4   MCHC 30.2*  --  30.6*   RDW 17.4*  --  16.7*     --  224

## 2017-03-02 NOTE — PLAN OF CARE
Problem: Goal/Outcome  Goal: Goal Outcome Summary  Outcome: No Change  FOCUS/GOAL  Bowel management, Bladder management and Medical management     ASSESSMENT, INTERVENTIONS AND CONTINUING PLAN FOR GOAL:  Pt continent of bladder. No BM this shift. LBM 3/1. Pt moved from room 502 to 548 and then to 546. He expressed some frustration over having to relocate multiple times, but he was understanding about room 548 needing maintenance. Pt denies pain at this time. Continue with POC.

## 2017-03-02 NOTE — PLAN OF CARE
Problem: Goal/Outcome  Goal: Goal Outcome Summary  FOCUS/GOAL  Bowel management, Bladder management, Pain management and Skin integrity     ASSESSMENT, INTERVENTIONS AND CONTINUING PLAN FOR GOAL:  Pt is alert and oriented. Continent of bladder, voiding in urinal. No BM this shift. Denied pain. Right double lumen PICC is patent and intact. Back incision is c/d/i, open to air. 0200 blood glucose 139. Pt slept well this shift. Uses call light appropriately, able to make needs known. Will continue with POC.

## 2017-03-02 NOTE — PLAN OF CARE
Problem: Goal/Outcome  Goal: Goal Outcome Summary  OT: Okay for nursing to assist pt. With SB transfers to and from commode with A of 2. Pt. Will complete napoleon care and clothing mgmt. With weight shifting techniques.

## 2017-03-02 NOTE — PLAN OF CARE
Problem: Goal/Outcome  Goal: Goal Outcome Summary  Outcome: No Change  FOCUS/GOAL  Pain management and Medical management     ASSESSMENT, INTERVENTIONS AND CONTINUING PLAN FOR GOAL:  Pt continent of bladder. No BM this shift. LBM 3/1 with NOC shift. PICC dressing was changed and is CDI. Pt's AM Lantus dose was decreased from 20 units to 18 units. Pt c/o low back pain and was given tramadol x1 with some relief. Pt also c/o bedtime anxiety and was given PRN seroquel with some relief. Continue with POC.

## 2017-03-02 NOTE — PROGRESS NOTES
[facility]   Acute Rehabilitation Unit  Daily progress note    interval history  Tad Manning was seen and examined at bedside. Had difficulty getting to sleep last night. Had some anxiety during the night time but not as bad as was having last week. Happy with fewer interruptions in the night time. Therapies continue to go well. SLP is being decreased, eventually d/c'd due to meeting goals. Patient is a bit disappointed about this, he enjoyed SLP as a physical break from PT/OT. However, he is happy about physical progress (stood 40 seconds today) and motivated to continue.     Reviewed plans from care conference yesterday. Patient is happy he is not going home yet, wants to be as independent as possible by time of discharge.      medications    insulin glargine  18 Units Subcutaneous QAM AC     lisinopril  5 mg Oral Daily     ranitidine  150 mg Oral BID     furosemide  20 mg Oral Daily     metoprolol  50 mg Oral BID     ramelteon  8 mg Oral At Bedtime     metFORMIN  500 mg Oral BID w/meals     clopidogrel  75 mg Oral Daily     acetaminophen  1,000 mg Oral TID     ascorbic acid (VITAMIN C) tablet 500 mg  500 mg Oral Daily     aspirin  325 mg Oral Daily     atorvastatin  40 mg Oral Daily     cefTRIAXone  2 g Intravenous Q12H     cholecalciferol  1,000 Units Oral Daily     tamsulosin  0.8 mg Oral At Bedtime     insulin aspart  1-7 Units Subcutaneous TID AC     insulin aspart  1-5 Units Subcutaneous At Bedtime     heparin lock flush  5-10 mL Intracatheter Q24H     QUEtiapine, diphenhydrAMINE **OR** diphenhydrAMINE, tramadol, senna-docusate, polyethylene glycol, bisacodyl, - MEDICATION INSTRUCTIONS -, lidocaine, glucose **OR** dextrose **OR** glucagon, naloxone, sodium chloride (PF), heparin lock flush       physical exam  /77 (BP Location: Left arm)  Pulse 82  Temp 97.1  F (36.2  C) (Oral)  Resp 20  Ht 1.829 m (6')  Wt 87.1 kg (192 lb 1.6 oz)  SpO2 95%  BMI 26.05  kg/m2     Gen: NAD, sitting up in chair  HEENT: has glasses   Pulm: lungs clear to auscultation  Neuro/MSK: unchanged   alert and oriented, strength 3/5 HF, 4-/5 KE/KF, 4/5 DF, EHL and PF b/l, paresthesia at plantar aspect of feet b/l     Skin: thoracic surgical incision healing well, continues to have some surrounding erythema and very small open area in the middle with some scab in place      labs  BG in good range     assessment and plan  Tad Manning is a 76 year old right hand dominant male with 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 Medical co-morbidities include post-op pain, anemia, hyponatremia, and h/o bacteremia on IV antibiotic. PMH significant for DM with peripheral neuropathy, HTN, HLD, CAD s/p CABG, systolic and diastolic heart failure, and CLL in remission.      Functional deficits are weakness in bilateral lower extremities, sensory loss in feet (due to diabetic neuropathy but worse likely due to SCI), neurogenic bladder and bowel, and acute on chronic deconditioning with poor endurance.         Admission to acute inpatient rehab 2/15.     Rehabilitation: please see interval history for updates. Ordered SLP on 2/16 given his low score on SLUMS 23/30. SLP d/c on 3/2, OT increase to  min/day.      Medical Conditions; appreciate hospitalist team assistance in medical management.      # Incomplete SCI:  # Thoracic osteomyelitis/discitis with epidural abscess:  # S/p T9-10 laminectomy, T7-12 posterior arthrodesis and TF right sided T9-10 fusion.   # Strep mitis bacteremia:      --TLSO for comfort  --wound care per recs; sutures removed at 2 weeks higinio on 2/28.  --PICC line care on the right arm   -nurse reported green discharge 2/22, no clinical s/s of infection - will monitor   --pain management with prn tylenol, tramadol, and oxycodone   -2/19 tylenol was scheduled to help with pain and  minimize narcotic use. Also tramadol was added to again minimize oxycodone intake due to side effects like urinary retention.    -2/22 requiring tramadol and oxycodone x1-2/day; will monitor for one more day and dc oxycodone.    -2/24 dc'd oxy.    --continue IV rocephin for total of 6 weeks - end date 3/24  --should f/u with ID and neurosurgery teams as outpatient      ** Check CBC, CRP, creatinine, and LFTs weekly while on therapy and have results faxed to the [facility] at [phone]. - faxed on 2/21 and 2/28.     Hyponatremia: resolved since 2/12 and stable ~ 131. 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 Most recent labs 139, 141, 140.    Anemia: likely due to acute illness and blood loss; stable since his surgery. Will monitor clinically and repeat labs as needed. Transfused 1 unit blood 2/25.    Anxiety: patient reports history of occasional nighttime anxiety associated with inability to sleep prior to this admission. In the past, he has taken Zolpidem prn for this. Now complains of three consecutive nights of anxiety. Was given 12.5mg seroquel 2/22, patient says he thinks it helped.  --started scheduled ramelteon and prn seroquel on 2/23. 3/2 changed ramelteon to remeron 15 qhs due to no benefits and also to help with mood and appetite.   --discussed with pt and ordered rehab psychology consult      # DM type II with neuropathy: on glipizide and victoza injections prior to admission. HgbA1C was 6.1% on 2/10. Currently on lantus 12 units daily and SSI. 2/17 lantus was increased to 14 for better control. 2/21 lantus increased to 20 daily and also was started on metformin 500 bid. 2/28 not requiring SSI. 3/1 stopped BG check at 2am.      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 Continue ASA; plavix restarted on 2/20.     # HTN: on amlodipine, atenolol, lisinopril and prinzide (?) prior to admission.  "Currently on atenolol 25 daily; BP has been variable with new arrhythmia - see below.    # Orthostatic hypotension: as confirmed by PT on 2/17. Multifactorial due to anemia, severe deconditioning and possibly autonomic instability due SCI. Should have TEDs and abdominal binder before getting out of bed. Ensure adequate hydration. Orthostatic precautions. See below as well.     # Tachycardia with irregular rhythm/A flutter: occurred during PT session on 2/21 while on tilt table; tachycardia is multifactorial as orthostatic hypotension. Spoke with Dr. Lange hospitalist, appreciated assistance. Cortisol wnl 2/22. Follow orthostatic precautions. Asymptomatic at this point. 2/22 repeat EKG with atrial flutter and variable block. Cardiology team was consulted; appreciate assistance. Recommended to continue current regimen with ASA and plavix and f/u as outpatient to review the plan for anticoagulation. 2/24 repeat TTE showed AFib and reduced EF; atenolol was changed to metoprolol for better rate control. 1 unit blood transfused 2/25.  --now in NSR  --continue metoprolol 50 bid, ASA and plavix   --f/u as outpatient to review the plan for anticoagulation    # HLD: on lipitor 40 daily. LDL was 48 in 5/2014 when last checked.      # CAD s/p CABG in 1995 with HFrEF (EF 40-45%):   # Acute systolic CHF:  no active issues upon admission. Had issues with orthostatic hypotension and A flutter as above. 2/24 repeat TTE showed worsening of his cardiac function. Per cardiology note, \"suspect EF is worse b/c tachycardia mediated from aflutter. Other possibility is stress induced. Less likely ischemic\". Was started on lisinopril 2.5mg daily, which was increased to 5mg daily on 2/28.  He also developed b/l pleural effusion following a blood transfusion on Sat 2/25; presented with acute onset SOB. Was started on lasix with some clinical improvement since then.   --continue ASA, lipitor and atenolol  --continue lisinopril 5mg daily; will " titrate gradually to 10mg daily if BP remains > 120/80 per recs   --continue lasix 20mg daily     # H/o CLL in remission: last chemo in 2015; is followed by oncology team - no active issues.        FEN: regular, mod consistent CHO.     Bowel: ? Neurogenic bowel - on prn bowel meds upon admission with last BM on 2/14. Will monitor and adjust meds/bowel regimen as needed. 2/17 scheduled bowel meds. 2/20 had loose BMs over the weekend so meds were changed to prn; started on metformin which can be contributing as well. 2/28 regular daily BMs on current regimen.    Bladder: ? Neurogenic bladder - h/o BPH on flomax prior to admission; was not resumed after admission to the hospital likely due to low BP but restarted upon admission to ARU. PVRs elevated and was cathed once on 2/18. Plan is to minimize narcotic use, void in standing/sitting position as much as possible, and continue to monitor PVRs. 2/28 low PVRs.     DVT Prophylaxis: mechanical; per neurosurgery team notes Ok to start chemical prophylaxis on 2/16 - will clarify. Discussed and started lovenox 40 daily on 2/17. 2/21 dc'd lovenox per hospitalist team recs as he was started on plavix.     GI Prophylaxis: none, oral intake.     Code: full    Disposition: home vs TCU pending progress and family support. Discussed in team rounds. Care conf 3/1.    ELOS:  3-4 weeks. ~2 weeks from care conference on 3/1.    Follow up Appointments on Discharge: ID, neurosurgery, PCP and PM&R. + cardiology    Follow-up with neurosurgery, Dr. Marcelo Trent MD in 2 weeks for wound check.       Follow-up with Infectious Disease, Dr. Gillian Vila, on 3/23/17. If you have not heard back regarding your appointment time please call [phone] and ask to page the Infectious Disease fellow regarding your pre-planned appointment with Dr. Vila on 3/23/17 to assess antibiotic regimen and progress    Hyun Koehler MD  Physical Medicine & Rehabilitation      I spent a total of 25  minutes face-to-face or managing the care of Tad Manning. Over 50% of my time on the unit was spent counseling the patient and coordinating care. See note for details.

## 2017-03-02 NOTE — PLAN OF CARE
Problem: Goal/Outcome  Goal: Goal Outcome Summary  Outcome: Therapy, progress toward functional goals as expected  PTA: -15 min this am d/t nursing cares. Pt progressing well with SB transfers. Pt moved to non-lift room. Nursing ok to A pt w/ SB transfers. OT updated white board w/ this info.

## 2017-03-02 NOTE — PLAN OF CARE
Problem: Goal/Outcome  Goal: Goal Outcome Summary  Speech Language Therapy Discharge Summary     Reason for therapy discharge:    All goals and outcomes met, no further needs identified.     Progress towards therapy goal(s). See goals on Care Plan in [facility] electronic health record for goal details.  Goals met     Therapy recommendation(s):    No further therapy is recommended. SLP: pt has been seen for language and cognitive communication treatment. Pt has met goals, appears to be functioning WNL for all language skills as well as cognitive linguistic (memory, reasoning, complex attention). Pt does benefit from repetition, and simplifying directions for complex/new tasks.  Upon discharge to home, he may benefit from oversight with medications/finances.

## 2017-03-02 NOTE — PLAN OF CARE
Problem: Goal/Outcome  Goal: Goal Outcome Summary  Outcome: Therapy, progress toward functional goals as expected  SLP; pt engaged in iPad - based activity focusing on attention, memory, reasoning (lumosity and checkers).  Pt able to demonstrate deductive reasoning, flexible/divided attention, recall, and planning ahead independently. D/C from speech due to goals being met and pt's report of feeling skills are WFL and at/near baseline.

## 2017-03-02 NOTE — PLAN OF CARE
Problem: Goal/Outcome  Goal: Goal Outcome Summary  Outcome: Therapy, progress toward functional goals as expected  PTAS: Pt is motivated to participate. Work on increasing LE/core strength to increase standing time. SB transfers going smoothly, w/ CGA and removal of SB post transfer.

## 2017-03-03 LAB
ANION GAP SERPL CALCULATED.3IONS-SCNC: 9 MMOL/L (ref 3–14)
BUN SERPL-MCNC: 11 MG/DL (ref 7–30)
CALCIUM SERPL-MCNC: 8.1 MG/DL (ref 8.5–10.1)
CHLORIDE SERPL-SCNC: 106 MMOL/L (ref 94–109)
CO2 SERPL-SCNC: 24 MMOL/L (ref 20–32)
CREAT SERPL-MCNC: 0.56 MG/DL (ref 0.66–1.25)
GFR SERPL CREATININE-BSD FRML MDRD: ABNORMAL ML/MIN/1.7M2
GLUCOSE BLDC GLUCOMTR-MCNC: 101 MG/DL (ref 70–99)
GLUCOSE BLDC GLUCOMTR-MCNC: 115 MG/DL (ref 70–99)
GLUCOSE BLDC GLUCOMTR-MCNC: 115 MG/DL (ref 70–99)
GLUCOSE BLDC GLUCOMTR-MCNC: 119 MG/DL (ref 70–99)
GLUCOSE BLDC GLUCOMTR-MCNC: 123 MG/DL (ref 70–99)
GLUCOSE SERPL-MCNC: 120 MG/DL (ref 70–99)
POTASSIUM SERPL-SCNC: 4 MMOL/L (ref 3.4–5.3)
SODIUM SERPL-SCNC: 139 MMOL/L (ref 133–144)

## 2017-03-03 PROCEDURE — 80048 BASIC METABOLIC PNL TOTAL CA: CPT | Performed by: INTERNAL MEDICINE

## 2017-03-03 PROCEDURE — A9270 NON-COVERED ITEM OR SERVICE: HCPCS | Mod: GY | Performed by: PHYSICAL MEDICINE & REHABILITATION

## 2017-03-03 PROCEDURE — 25000128 H RX IP 250 OP 636: Performed by: INTERNAL MEDICINE

## 2017-03-03 PROCEDURE — 99211 OFF/OP EST MAY X REQ PHY/QHP: CPT

## 2017-03-03 PROCEDURE — 97535 SELF CARE MNGMENT TRAINING: CPT | Mod: GO | Performed by: OCCUPATIONAL THERAPIST

## 2017-03-03 PROCEDURE — 12800006 ZZH R&B REHAB

## 2017-03-03 PROCEDURE — 97535 SELF CARE MNGMENT TRAINING: CPT | Mod: GO

## 2017-03-03 PROCEDURE — 25000132 ZZH RX MED GY IP 250 OP 250 PS 637: Mod: GY | Performed by: INTERNAL MEDICINE

## 2017-03-03 PROCEDURE — A9270 NON-COVERED ITEM OR SERVICE: HCPCS | Mod: GY | Performed by: INTERNAL MEDICINE

## 2017-03-03 PROCEDURE — 97112 NEUROMUSCULAR REEDUCATION: CPT | Mod: GP

## 2017-03-03 PROCEDURE — 36592 COLLECT BLOOD FROM PICC: CPT | Performed by: INTERNAL MEDICINE

## 2017-03-03 PROCEDURE — 25000132 ZZH RX MED GY IP 250 OP 250 PS 637: Mod: GY | Performed by: PHYSICAL MEDICINE & REHABILITATION

## 2017-03-03 PROCEDURE — 40000133 ZZH STATISTIC OT WARD VISIT

## 2017-03-03 PROCEDURE — 40000193 ZZH STATISTIC PT WARD VISIT

## 2017-03-03 PROCEDURE — 99232 SBSQ HOSP IP/OBS MODERATE 35: CPT | Performed by: INTERNAL MEDICINE

## 2017-03-03 PROCEDURE — 97110 THERAPEUTIC EXERCISES: CPT | Mod: GP

## 2017-03-03 PROCEDURE — 00000146 ZZHCL STATISTIC GLUCOSE BY METER IP

## 2017-03-03 PROCEDURE — 40000133 ZZH STATISTIC OT WARD VISIT: Performed by: OCCUPATIONAL THERAPIST

## 2017-03-03 PROCEDURE — 25000125 ZZHC RX 250: Performed by: PHYSICAL MEDICINE & REHABILITATION

## 2017-03-03 RX ADMIN — CEFTRIAXONE 2 G: 2 INJECTION, POWDER, FOR SOLUTION INTRAMUSCULAR; INTRAVENOUS at 01:03

## 2017-03-03 RX ADMIN — OXYCODONE HYDROCHLORIDE AND ACETAMINOPHEN 500 MG: 500 TABLET ORAL at 07:55

## 2017-03-03 RX ADMIN — TAMSULOSIN HYDROCHLORIDE 0.8 MG: 0.4 CAPSULE ORAL at 21:43

## 2017-03-03 RX ADMIN — METFORMIN HYDROCHLORIDE 500 MG: 500 TABLET ORAL at 07:55

## 2017-03-03 RX ADMIN — METOPROLOL TARTRATE 50 MG: 50 TABLET, FILM COATED ORAL at 21:45

## 2017-03-03 RX ADMIN — ACETAMINOPHEN 1000 MG: 500 TABLET, FILM COATED ORAL at 15:44

## 2017-03-03 RX ADMIN — RANITIDINE HYDROCHLORIDE 150 MG: 150 TABLET, FILM COATED ORAL at 21:45

## 2017-03-03 RX ADMIN — RANITIDINE HYDROCHLORIDE 150 MG: 150 TABLET, FILM COATED ORAL at 08:00

## 2017-03-03 RX ADMIN — INSULIN GLARGINE 18 UNITS: 100 INJECTION, SOLUTION SUBCUTANEOUS at 07:58

## 2017-03-03 RX ADMIN — CEFTRIAXONE 2 G: 2 INJECTION, POWDER, FOR SOLUTION INTRAMUSCULAR; INTRAVENOUS at 12:27

## 2017-03-03 RX ADMIN — VITAMIN D, TAB 1000IU (100/BT) 1000 UNITS: 25 TAB at 07:55

## 2017-03-03 RX ADMIN — MIRTAZAPINE 15 MG: 7.5 TABLET, FILM COATED ORAL at 21:44

## 2017-03-03 RX ADMIN — METOPROLOL TARTRATE 50 MG: 50 TABLET, FILM COATED ORAL at 08:00

## 2017-03-03 RX ADMIN — ACETAMINOPHEN 1000 MG: 500 TABLET, FILM COATED ORAL at 07:55

## 2017-03-03 RX ADMIN — SODIUM CHLORIDE, PRESERVATIVE FREE 5 ML: 5 INJECTION INTRAVENOUS at 07:33

## 2017-03-03 RX ADMIN — ACETAMINOPHEN 1000 MG: 500 TABLET, FILM COATED ORAL at 21:44

## 2017-03-03 RX ADMIN — CLOPIDOGREL BISULFATE 75 MG: 75 TABLET, FILM COATED ORAL at 07:55

## 2017-03-03 RX ADMIN — ATORVASTATIN CALCIUM 40 MG: 40 TABLET, FILM COATED ORAL at 07:55

## 2017-03-03 RX ADMIN — FUROSEMIDE 20 MG: 20 TABLET ORAL at 07:55

## 2017-03-03 RX ADMIN — ASPIRIN 325 MG ORAL TABLET 325 MG: 325 PILL ORAL at 07:55

## 2017-03-03 RX ADMIN — LISINOPRIL 5 MG: 5 TABLET ORAL at 07:55

## 2017-03-03 RX ADMIN — METFORMIN HYDROCHLORIDE 500 MG: 500 TABLET ORAL at 18:35

## 2017-03-03 NOTE — PROGRESS NOTES
"  [facility]   Acute Rehabilitation Unit  Daily progress note    interval history  Tad Manning was seen and examined at bedside. Didn't sleep well last night; denies any pain or discomfort. Had education at [facility] for PICC care and antibiotic infusion. SO, brother and sister-in-law at bed side. Answered all questions.     Appreciate [facility] nurse f/y for coccygeal wound - healing well. Making progress with therapies; \"tolerating increased standing time\" with stable BP and HR. Working on SB transfers.     medications    mirtazapine  15 mg Oral At Bedtime     insulin glargine  18 Units Subcutaneous QAM AC     lisinopril  5 mg Oral Daily     ranitidine  150 mg Oral BID     furosemide  20 mg Oral Daily     metoprolol  50 mg Oral BID     metFORMIN  500 mg Oral BID w/meals     clopidogrel  75 mg Oral Daily     acetaminophen  1,000 mg Oral TID     ascorbic acid (VITAMIN C) tablet 500 mg  500 mg Oral Daily     aspirin  325 mg Oral Daily     atorvastatin  40 mg Oral Daily     cefTRIAXone  2 g Intravenous Q12H     cholecalciferol  1,000 Units Oral Daily     tamsulosin  0.8 mg Oral At Bedtime     insulin aspart  1-7 Units Subcutaneous TID AC     insulin aspart  1-5 Units Subcutaneous At Bedtime     heparin lock flush  5-10 mL Intracatheter Q24H     QUEtiapine, diphenhydrAMINE **OR** diphenhydrAMINE, tramadol, senna-docusate, polyethylene glycol, bisacodyl, - MEDICATION INSTRUCTIONS -, lidocaine, glucose **OR** dextrose **OR** glucagon, naloxone, sodium chloride (PF), heparin lock flush       physical exam  /80  Pulse 93  Temp 95.4  F (35.2  C) (Oral)  Resp 16  Ht 1.829 m (6')  Wt 87.1 kg (192 lb 1.6 oz)  SpO2 96%  BMI 26.05 kg/m2     Gen: NAD, resting in bed   HEENT: has glasses   Pulm: non-labored   Neuro/MSK: paraparesis          labs  BG in good range   BMP wnl    assessment and plan  Tad Manning is a 76 year old right hand dominant male with incomplete spinal cord " injury due to 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 Medical co-morbidities include post-op pain, anemia, hyponatremia, and h/o bacteremia on IV antibiotic. PMH significant for DM with peripheral neuropathy, HTN, HLD, CAD s/p CABG, systolic and diastolic heart failure, and CLL in remission.      Functional deficits are weakness in bilateral lower extremities, sensory loss in feet (due to diabetic neuropathy but worse likely due to SCI), neurogenic bladder and bowel, and acute on chronic deconditioning with poor endurance.         Admission to acute inpatient rehab 2/15.     Rehabilitation: please see interval history for updates. Ordered SLP on 2/16 given his low score on SLUMS 23/30. SLP d/c on 3/2, OT increase to  min/day.      Medical Conditions; appreciate hospitalist team assistance in medical management.      # Incomplete SCI:  # Thoracic osteomyelitis/discitis with epidural abscess:  # S/p T9-10 laminectomy, T7-12 posterior arthrodesis and TF right sided T9-10 fusion.   # Strep mitis bacteremia:      --TLSO for comfort  --wound care per recs; sutures removed at 2 weeks higinio on 2/28.  --PICC line care on the right arm   -nurse reported green discharge 2/22, no clinical s/s of infection - will monitor   --pain management with prn tylenol, tramadol, and oxycodone   -2/19 tylenol was scheduled to help with pain and minimize narcotic use. Also tramadol was added to again minimize oxycodone intake due to side effects like urinary retention.    -2/22 requiring tramadol and oxycodone x1-2/day; will monitor for one more day and dc oxycodone.    -2/24 dc'd oxy.    --continue IV rocephin for total of 6 weeks - end date 3/24. 3/3 completed PLC class.   --should f/u with ID and neurosurgery teams as outpatient      ** Check CBC, CRP, creatinine, and LFTs weekly while on therapy and have results faxed to the [facility] at [phone]. - faxed on  2/21 and 2/28.     Hyponatremia: resolved since 2/12 and stable ~ 131. 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 Most recent labs 139, 141, 140.    Anemia: likely due to acute illness and blood loss; stable since his surgery. Will monitor clinically and repeat labs as needed. Transfused 1 unit blood 2/25.    Anxiety: patient reports history of occasional nighttime anxiety associated with inability to sleep prior to this admission. In the past, he has taken Zolpidem prn for this. Now complains of three consecutive nights of anxiety. Was given 12.5mg seroquel 2/22, patient says he thinks it helped.  --started scheduled ramelteon and prn seroquel on 2/23. 3/2 changed ramelteon to remeron 15 qhs due to no benefits and also to help with mood and appetite.   --discussed with pt and ordered rehab psychology consult      # DM type II with neuropathy: on glipizide and victoza injections prior to admission. HgbA1C was 6.1% on 2/10. Currently on lantus 12 units daily and SSI. 2/17 lantus was increased to 14 for better control. 2/21 lantus increased to 20 daily and also was started on metformin 500 bid. 2/28 not requiring SSI. 3/1 stopped BG check at 2am.      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 Continue ASA; plavix restarted on 2/20.     # HTN: on amlodipine, atenolol, lisinopril and prinzide (?) prior to admission. Currently on atenolol 25 daily; BP has been variable with new arrhythmia - see below.    # Orthostatic hypotension: as confirmed by PT on 2/17. Multifactorial due to anemia, severe deconditioning and possibly autonomic instability due SCI. Should have TEDs and abdominal binder before getting out of bed. Ensure adequate hydration. Orthostatic precautions. See below as well.     # Tachycardia with irregular rhythm/A flutter: occurred during PT session on 2/21 while on tilt table; tachycardia is multifactorial as  "orthostatic hypotension. Spoke with Dr. Lange hospitalist, appreciated assistance. Cortisol wnl 2/22. Follow orthostatic precautions. Asymptomatic at this point. 2/22 repeat EKG with atrial flutter and variable block. Cardiology team was consulted; appreciate assistance. Recommended to continue current regimen with ASA and plavix and f/u as outpatient to review the plan for anticoagulation. 2/24 repeat TTE showed AFib and reduced EF; atenolol was changed to metoprolol for better rate control. 1 unit blood transfused 2/25.  --now in NSR  --continue metoprolol 50 bid, ASA and plavix   --f/u as outpatient to review the plan for anticoagulation    # HLD: on lipitor 40 daily. LDL was 48 in 5/2014 when last checked.      # CAD s/p CABG in 1995 with HFrEF (EF 40-45%):   # Acute systolic CHF:  no active issues upon admission. Had issues with orthostatic hypotension and A flutter as above. 2/24 repeat TTE showed worsening of his cardiac function. Per cardiology note, \"suspect EF is worse b/c tachycardia mediated from aflutter. Other possibility is stress induced. Less likely ischemic\". Was started on lisinopril 2.5mg daily, which was increased to 5mg daily on 2/28.  He also developed b/l pleural effusion following a blood transfusion on Sat 2/25; presented with acute onset SOB. Was started on lasix with some clinical improvement since then.   --continue ASA, lipitor and atenolol  --continue lisinopril 5mg daily; will titrate gradually to 10mg daily if BP remains > 120/80 per recs   --continue lasix 20mg daily     # H/o CLL in remission: last chemo in 2015; is followed by oncology team - no active issues.        FEN: regular, mod consistent CHO.     Bowel: ? Neurogenic bowel - on prn bowel meds upon admission with last BM on 2/14. Will monitor and adjust meds/bowel regimen as needed. 2/17 scheduled bowel meds. 2/20 had loose BMs over the weekend so meds were changed to prn; started on metformin which can be contributing as " well. 2/28 regular daily BMs on current regimen.    Bladder: ? Neurogenic bladder - h/o BPH on flomax prior to admission; was not resumed after admission to the hospital likely due to low BP but restarted upon admission to ARU. PVRs elevated and was cathed once on 2/18. Plan is to minimize narcotic use, void in standing/sitting position as much as possible, and continue to monitor PVRs. 2/28 low PVRs.     DVT Prophylaxis: mechanical; per neurosurgery team notes Ok to start chemical prophylaxis on 2/16 - will clarify. Discussed and started lovenox 40 daily on 2/17. 2/21 dc'd lovenox per hospitalist team recs as he was started on plavix.     GI Prophylaxis: none, oral intake.     Code: full    Disposition: home vs TCU pending progress and family support. Discussed in team rounds. Care conf 3/1.    ELOS:  3-4 weeks. ~2 weeks from care conference on 3/1.    Follow up Appointments on Discharge: ID, neurosurgery, PCP and PM&R. + cardiology    Follow-up with neurosurgery, Dr. Marcelo Trent MD in 2 weeks for wound check.       Follow-up with Infectious Disease, Dr. Gillian Vila, on 3/23/17. If you have not heard back regarding your appointment time please call [phone] and ask to page the Infectious Disease fellow regarding your pre-planned appointment with Dr. Vila on 3/23/17 to assess antibiotic regimen and progress    Hyun Koehler MD  Physical Medicine & Rehabilitation      I spent a total of 15 minutes face-to-face or managing the care of Tad Tim Manning. Over 50% of my time on the unit was spent counseling the patient and coordinating care. See note for details.

## 2017-03-03 NOTE — PLAN OF CARE
Problem: Goal/Outcome  Goal: Goal Outcome Summary  Pt moving nearing independence with SB transfers and able to direct cares well.  Pt was limited this session with multiple disciplines (lab, nursing) and then having high BPs pt missed 15 minutes of session.

## 2017-03-03 NOTE — PLAN OF CARE
Problem: Goal/Outcome  Goal: Goal Outcome Summary  Outcome: Improving  FOCUS/GOAL  Medical management     ASSESSMENT, INTERVENTIONS AND CONTINUING PLAN FOR GOAL:  Note from [id number]:  Pt slept most of the night per report, and per routine rounds also , denied any pain , up around 0630, requested his clothes to be ready for his AM OT. Pt has some redness on his buttock but declined turning and repositioning per report.   Nursing will cont POC

## 2017-03-03 NOTE — PLAN OF CARE
Problem: Goal/Outcome  Goal: Goal Outcome Summary  Outcome: No Change  FOCUS/GOAL  Skin integrity     ASSESSMENT, INTERVENTIONS AND CONTINUING PLAN FOR GOAL:  Pt denied pain. Pt sleeping between cares. Needing A of 2 for turning/repositioning to prevent skin breakdown. Erythema to buttocks noted. Pt declined position change x1. Will continue with POC.

## 2017-03-03 NOTE — PROGRESS NOTES
[facility], [place]   Hospitalist Daily Progress Note    17                                                   Date of Admission:2/15/2017  ___________________________________  INTERVAL HISTORY (24 Hrs)/SUBJECTIVE:   Last 24 hr events, care team notes reviewed.     Says doing well  No cp or sob or palpitations or LH  No fever or chills  No NV       ROS: 4 point ROS neg other than the symptoms noted above in the interval history.    OBJECTIVE :   VITALS:    Temp:  [95.6  F (35.3  C)-98  F (36.7  C)] 95.6  F (35.3  C)  Pulse:  [87-96] 96  Heart Rate:  [109] 109  Resp:  [18] 18  BP: (146-155)/(73-83) 155/75  SpO2:  [93 %-97 %] 93 %    Temp (24hrs), Av.8  F (36  C), Min:95.6  F (35.3  C), Max:98  F (36.7  C)      Wt Readings from Last 5 Encounters:   17 87.1 kg (192 lb 1.6 oz)   17 81.2 kg (179 lb)   17 85.7 kg (188 lb 15 oz)   16 95.3 kg (210 lb)   16 95.3 kg (210 lb)         PHYSICAL EXAM:  General: alert, interactive, NAD  HEENT: AT/NC, Moist MM  Respi/Chest: Non labored. Clear bl. Diminished at bases.   CVS/Heart: S1S2 regular,   Gi/Abd: Soft, non tender, non distended  Neuro: AO x3     Medications:   I have reviewed this patient's current medications.    Data:   All laboratory and imaging data in the past 24 hours reviewed:    LABS:  CMP    Recent Labs  Lab 17  0734 17  0642 17  0534 17  0540    140 141 139   POTASSIUM 4.0 3.6 3.7 3.6   CHLORIDE 106 107 106 105   CO2 24 27 28 25   ANIONGAP 9 6 7 9   * 94 97 126*   BUN 11 10 12 13   CR 0.56* 0.55* 0.63* 0.61*   GFRESTIMATED >90Non  GFR Calc >90Non  GFR Calc >90Non  GFR Calc >90Non  GFR Calc   GFRESTBLACK >90African American GFR Calc >90African American GFR Calc >90African American GFR Calc >90African American GFR Calc   JUSTINO 8.1* 7.9* 7.8* 8.0*   MAG  --   --   --  1.7   PROTTOTAL  --   --  4.9*  --     ALBUMIN  --   --  1.9*  --    BILITOTAL  --   --  0.3  --    ALKPHOS  --   --  137  --    AST  --   --  19  --    ALT  --   --  22  --      CBC    Recent Labs  Lab 02/27/17  0534 02/25/17  1352   WBC 5.0  --    RBC 3.07*  --    HGB 8.3* 7.8*   HCT 27.5*  --    MCV 90  --    MCH 27.0  --    MCHC 30.2*  --    RDW 17.4*  --      --      Echo 2/24:   Technically difficult study.The patient's rhythm is atrial fibrillation.    The left ventricle is severely dilated. Left ventricular systolic function is  severely reduced, ejection fraction is estimated at 25-30%. There is severe  global hypokinesis.  Right ventricular systolic function is mildly reduced.  The right ventricular systolic pressure is approximated at 15 mmHg plus the  right atrial pressure.   IVC is plethoric and estimated mean RA pressure is 15 mmHg.  No pericardial effusion present.         ASSESSMENT & PLAN :    # Acute systolic CHF: Started on Lasix 20 mg daily 2/26, strict I/O . Monitor   Clinically continues to feel better  Intermittent BMP.      # Orthostatic Dizziness and Tachycardia: worse on tilt table : improved  - Tachy w PAC's and fusion comlex on EKG 2/22: Atrial flutter with variable block 2/23.   - electrolytes stable : appreciate Cardiology consult  - Ct Orthostatic precautions : stable.   - changed Atenolol to Metoprolol bid 2/24 with improvement. Increased to 50 mg bid 2/25  * Echo decreased LVEF and severe global hypokinesis (? 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   The patient should have a CBC, CRP, creatinine and LFTs done weekly.   Followup with Infectious Disease, Dr. Gillian Max on 03/23/2017.      Diabetes mellitus: Continue the Lantus at night along with  sliding scale insulin.   Increased lantus to 20 units 2/19.   3/1/2017: decrease 18 Units.   Was on metformin at home , Started metformin 500 mg BID on 2/20     Recent Labs  Lab 03/03/17  0748 03/03/17  0734 03/02/17  2149 03/02/17  1725 03/02/17  1211 03/02/17  0736  03/01/17  0642  02/27/17  0534  02/26/17  0540   GLC  --  120*  --   --   --   --   --  94  --  97  --  126*   * 115* 123* 93 98 113*  < >  --   < >  --   < >  --    < > = values in this interval not displayed.  Monitor blood sugars      peripheral vascular disease, status post bilateral lower extremity stents:   Continue aspirin , Plavix  (started 2/20)   * Added Ranitidine for GI ppx- 2/27     # Chronic lymphocytic leukemia in remission, last chemo was 09/2015: Follow up with Oncology.   # Coronary artery disease: Continue aspirin 325 mg daily, Lipitor 40 mg daily, Metoprolol.   # Spinal cord compression with lower extremity weakness: plan per Primary team      Rest per PMR team    Panchito Christine MD   Hospitalist (Internal Medicine)  Pager: [phone].

## 2017-03-03 NOTE — PLAN OF CARE
Problem: Goal/Outcome  Goal: Goal Outcome Summary  Outcome: Therapy, progress towards functional goals is fair  PTAS: Pt tolerating increased standing time and addition of Leg Press ex using a bolster.

## 2017-03-03 NOTE — PLAN OF CARE
Problem: Goal/Outcome  Goal: Goal Outcome Summary  FOCUS/GOAL  Skin integrity and Reinforcement of self-care/ADL     ASSESSMENT, INTERVENTIONS AND CONTINUING PLAN FOR GOAL:  Pt here following infection from epidural related to a spinal procedure.  He is alert with periods of confusion.  He is able to make needs known.  He requires maxA1 for transfers from bed to chair (using slide board).  If he transfers to the commode he requires max A2 with slide board and shifts weight during napoleon-care.  Writer provided cleaning to napoleon-area and buttocks for skin degradation.  He needs repositioning every 2 hours to reduce moisture and promote blood flow.   He required prompting during his meal to take in sufficient calories.  He denied pain to this point and will be given scheduled Tylenol.  Surgical incision to back has slight erythema, this was cleaned with microklenz and left ALISHA.  Monitor for increased redness and drainage.

## 2017-03-03 NOTE — CONSULTS
Pt.and SO attended class for PICC cares and IV antibiotics. Pt.stated that he will be in the hospital for some time before discharge and isn't aware of home care at this time. He was taught the IV push method for the Rocephin and was able to do this with some reminders during the process. He was encouraged to use the written material for administering the medication so that he would no miss vital steps in the process and increase risk for infection. His SO was at the class but stated that Tad would be responsible for the antibiotic Pt.and SO were encouraged to call and take a review class if he was hospitalized for a period of time to make the teaching more current. They were given all the written information for the class.

## 2017-03-03 NOTE — PLAN OF CARE
Problem: Goal/Outcome  Goal: Goal Outcome Summary  Outcome: Therapy, progress toward functional goals as expected  Pt is alert and oriented x 4, he denies pain or discomfort. It is controlled with current pain medication. Back incision is clean, dry, and intact, it is open to air. Pt needed assist of one staff for transfer from chair > < bed . We will continue with current plan of care.

## 2017-03-03 NOTE — PROGRESS NOTES
"   03/02/17 1606   Visit Information   Visit Made By Staff    Type of Visit Initial   Visited Patient   Interventions   Plan of Care Review   With patient/family/proxy   Basic Spiritual Interventions    introduction/orientation to Spiritual Health Services;Assessment of spiritual needs/resources;Reflective conversation;Life Review   Advanced Assessments/Interventions   Presenting Concerns/Issues Spiritual/Gnosticism/emotional support   SPIRITUAL HEALTH SERVICES  SPIRITUAL  ASSESSMENT Progress Note  [facility] ([facility] 5R    PRIMARY FOCUS    Goals of Care    Support for coping    ILLNESS CIRCUMSTANCES:   Reviewed documentation. Responded to referral based on LOS.  Reflective conversation shared with Tad which integrated elements of illness and family narratives.    Context of Serious Illness/Sympton(s)-  Tad shared a list of challenging medical issues that he has already come through (heart surgery and CLL). \"Take it one day at a time is my cody.\" Tad never , but he is close with his brother and a group of friends he went to school with at [employer]. He spoke at length about trips to [place] he's taken with them, and with his brother and sister-in-law. 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    Resources for Support - His family and friends.    DISTRESS:     Emotional, Spiritual, Existential Distress - Did not claim any particular distress.    Sabianist Distress - n/a    Social/Cultural/Economic- n/a    SPIRITUAL/Faith COPING):     Adventism/Yashira - Tad said that he is Advent; he laughed and said he is neither Gnosticism nor spiritual, and does not pray.    Spiritual Practice(s) - n/a    Emotional, Relational,Existential Connections- Tad has strong connections to his family, in particular his six year old grand niece, and his group of friends with whom he chats regularly.     GOALS OF CARE:    Goals of Care - Tad said, " "\"I am not taking any more trips on [employer] He hopes to get home and that he will still be able to work.\"    Meaning/Sense-Making- \"Take it one day at a time.\"    PLAN: Will see Tad next week on ARU.    Rev. ReidO Alexandra-Guillermo  Staff   [phone]      "

## 2017-03-04 LAB
GLUCOSE BLDC GLUCOMTR-MCNC: 108 MG/DL (ref 70–99)
GLUCOSE BLDC GLUCOMTR-MCNC: 125 MG/DL (ref 70–99)
GLUCOSE BLDC GLUCOMTR-MCNC: 126 MG/DL (ref 70–99)
GLUCOSE BLDC GLUCOMTR-MCNC: 132 MG/DL (ref 70–99)

## 2017-03-04 PROCEDURE — 25000128 H RX IP 250 OP 636: Performed by: INTERNAL MEDICINE

## 2017-03-04 PROCEDURE — 25000132 ZZH RX MED GY IP 250 OP 250 PS 637: Mod: GY | Performed by: INTERNAL MEDICINE

## 2017-03-04 PROCEDURE — 40000133 ZZH STATISTIC OT WARD VISIT

## 2017-03-04 PROCEDURE — A9270 NON-COVERED ITEM OR SERVICE: HCPCS | Mod: GY | Performed by: PHYSICAL MEDICINE & REHABILITATION

## 2017-03-04 PROCEDURE — A9270 NON-COVERED ITEM OR SERVICE: HCPCS | Mod: GY | Performed by: INTERNAL MEDICINE

## 2017-03-04 PROCEDURE — 97110 THERAPEUTIC EXERCISES: CPT | Mod: GP | Performed by: PHYSICAL THERAPIST

## 2017-03-04 PROCEDURE — 97535 SELF CARE MNGMENT TRAINING: CPT | Mod: GO

## 2017-03-04 PROCEDURE — 97110 THERAPEUTIC EXERCISES: CPT | Mod: GO

## 2017-03-04 PROCEDURE — 25000132 ZZH RX MED GY IP 250 OP 250 PS 637: Mod: GY | Performed by: PHYSICAL MEDICINE & REHABILITATION

## 2017-03-04 PROCEDURE — 25000125 ZZHC RX 250: Performed by: PHYSICAL MEDICINE & REHABILITATION

## 2017-03-04 PROCEDURE — 40000193 ZZH STATISTIC PT WARD VISIT: Performed by: PHYSICAL THERAPIST

## 2017-03-04 PROCEDURE — 99231 SBSQ HOSP IP/OBS SF/LOW 25: CPT | Performed by: INTERNAL MEDICINE

## 2017-03-04 PROCEDURE — 97530 THERAPEUTIC ACTIVITIES: CPT | Mod: GP | Performed by: PHYSICAL THERAPIST

## 2017-03-04 PROCEDURE — 97530 THERAPEUTIC ACTIVITIES: CPT | Mod: GO

## 2017-03-04 PROCEDURE — 00000146 ZZHCL STATISTIC GLUCOSE BY METER IP

## 2017-03-04 PROCEDURE — 12800006 ZZH R&B REHAB

## 2017-03-04 RX ADMIN — ACETAMINOPHEN 1000 MG: 500 TABLET, FILM COATED ORAL at 20:00

## 2017-03-04 RX ADMIN — OXYCODONE HYDROCHLORIDE AND ACETAMINOPHEN 500 MG: 500 TABLET ORAL at 08:42

## 2017-03-04 RX ADMIN — MIRTAZAPINE 15 MG: 7.5 TABLET, FILM COATED ORAL at 20:00

## 2017-03-04 RX ADMIN — RANITIDINE HYDROCHLORIDE 150 MG: 150 TABLET, FILM COATED ORAL at 08:43

## 2017-03-04 RX ADMIN — INSULIN GLARGINE 18 UNITS: 100 INJECTION, SOLUTION SUBCUTANEOUS at 08:43

## 2017-03-04 RX ADMIN — METFORMIN HYDROCHLORIDE 500 MG: 500 TABLET ORAL at 08:42

## 2017-03-04 RX ADMIN — RANITIDINE HYDROCHLORIDE 150 MG: 150 TABLET, FILM COATED ORAL at 20:00

## 2017-03-04 RX ADMIN — FUROSEMIDE 20 MG: 20 TABLET ORAL at 08:42

## 2017-03-04 RX ADMIN — VITAMIN D, TAB 1000IU (100/BT) 1000 UNITS: 25 TAB at 08:42

## 2017-03-04 RX ADMIN — ACETAMINOPHEN 1000 MG: 500 TABLET, FILM COATED ORAL at 15:03

## 2017-03-04 RX ADMIN — ACETAMINOPHEN 1000 MG: 500 TABLET, FILM COATED ORAL at 08:42

## 2017-03-04 RX ADMIN — SODIUM CHLORIDE, PRESERVATIVE FREE 5 ML: 5 INJECTION INTRAVENOUS at 01:50

## 2017-03-04 RX ADMIN — TAMSULOSIN HYDROCHLORIDE 0.8 MG: 0.4 CAPSULE ORAL at 20:00

## 2017-03-04 RX ADMIN — ATORVASTATIN CALCIUM 40 MG: 40 TABLET, FILM COATED ORAL at 08:42

## 2017-03-04 RX ADMIN — CLOPIDOGREL BISULFATE 75 MG: 75 TABLET, FILM COATED ORAL at 08:42

## 2017-03-04 RX ADMIN — METFORMIN HYDROCHLORIDE 500 MG: 500 TABLET ORAL at 18:28

## 2017-03-04 RX ADMIN — CEFTRIAXONE 2 G: 2 INJECTION, POWDER, FOR SOLUTION INTRAMUSCULAR; INTRAVENOUS at 01:04

## 2017-03-04 RX ADMIN — CEFTRIAXONE 2 G: 2 INJECTION, POWDER, FOR SOLUTION INTRAMUSCULAR; INTRAVENOUS at 12:48

## 2017-03-04 RX ADMIN — SODIUM CHLORIDE, PRESERVATIVE FREE 5 ML: 5 INJECTION INTRAVENOUS at 19:59

## 2017-03-04 RX ADMIN — LISINOPRIL 5 MG: 5 TABLET ORAL at 08:43

## 2017-03-04 RX ADMIN — ASPIRIN 325 MG ORAL TABLET 325 MG: 325 PILL ORAL at 08:42

## 2017-03-04 RX ADMIN — METOPROLOL TARTRATE 50 MG: 50 TABLET, FILM COATED ORAL at 08:42

## 2017-03-04 RX ADMIN — METOPROLOL TARTRATE 50 MG: 50 TABLET, FILM COATED ORAL at 20:00

## 2017-03-04 NOTE — PLAN OF CARE
Problem: Goal/Outcome  Goal: Goal Outcome Summary  Outcome: Improving  FOCUS/GOAL  Bladder management, Medical management, Mobility and Cognition/Memory/Judgment/Problem solving     ASSESSMENT, INTERVENTIONS AND CONTINUING PLAN FOR GOAL:  Use call light appropriately , had shower this evening staff has to use a golvo lift to transfer to shower chair , pt used a sliding board with 1 assist from bed to w/c   Bgs are within goal denies pain , using urinal independently with set up LBM this AM

## 2017-03-04 NOTE — PLAN OF CARE
Problem: Goal/Outcome  Goal: Goal Outcome Summary  Patient is alert and oriented x 4. Denies any pain. Back incision is intact, open to air. 2+ bilateral feet edema, sophy socks on at this time. Continent of bladder utilizing a urinal independently. Staff assisting to empty urinal. Picc right upper extremity is patent and intact. Continues on IV abx . Will continue with poc.

## 2017-03-04 NOTE — PLAN OF CARE
Problem: Goal/Outcome  Goal: Goal Outcome Summary  Outcome: No Change  FOCUS/GOAL  Medical management     ASSESSMENT, INTERVENTIONS AND CONTINUING PLAN FOR GOAL:     Patient denied pain this shift. Antibiotic infused through PICC line without difficulty Sleping betweeen cares. Continue plan of care.

## 2017-03-04 NOTE — PLAN OF CARE
Problem: Goal/Outcome  Goal: Goal Outcome Summary  Pt reporting increased fatigue this date however motivated to participate in therapies. Initiated BUE HEP and plan to trial new seating system next session due to report of buttock discomfort.

## 2017-03-04 NOTE — PLAN OF CARE
Problem: Goal/Outcome  Goal: Goal Outcome Summary  Standing frame in pm.  BP's as follows:  Time                BP                   HR                   Symptoms  0                      138/66             91                    None  3 min               113/55             90                    None  8 minutes        117/51             90                    None  11 minutes      99/60               84                    Fatigue  Sitting 3 min    116/60             89                    Fatigue  Was using abd binder and LUDIVINA socks

## 2017-03-04 NOTE — PROGRESS NOTES
[facility], [place]   Physical Medicine and Rehabilitation Daily Note           Assessment and Plan of Care:   Tad Manning is a 76 year old right hand dominant male with 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 Medical co-morbidities include post-op pain, anemia, hyponatremia, and h/o bacteremia on IV antibiotic. PMH significant for DM with peripheral neuropathy, HTN, HLD, CAD s/p CABG, systolic and diastolic heart failure, and CLL in remission. Admission to acute inpatient rehab 2/15.     --Vitals stable. No lab today. BGs in good range.  --Continue ongoing medical management.   -slept well last night; no change in meds today.    -spoke with hospitalist team regarding the duration of treatment with lasix and also final regimen for BG management.     --Continue therapies and plan of care. Participation limited by fatigue. Tolerating tilt table better overall.              Review of Systems:   Slept very well lat night as he was very tired. Mild pain in low back area. Denies fever, chills, CP, SOB, N/V, abdominal pain, new pain or weakness/numbness/tingling.             Physical Exam:     Vitals:    03/03/17 2138 03/04/17 0551 03/04/17 0743 03/04/17 1542   BP: 142/73  148/76 143/78   BP Location: Left arm  Left arm Left arm   Pulse: 96  95 95   Resp:   16 16   Temp:   97.4  F (36.3  C) 95.9  F (35.5  C)   TempSrc:   Oral Oral   SpO2:   92% 95%   Weight:  90.8 kg (200 lb 1.6 oz)     Height:         Gen: NAD, resting in bed  Heart: regular - irregular   Lungs: clear breath sounds b/l  Abd: soft and non-tender  Ext: wwp, no edema in BLE, no tenderness in calves  MSK/neuro: alert and oriented. speech fluent. 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          Data:   Scheduled meds    sterile water (bottle)         mirtazapine  15 mg Oral At Bedtime     insulin glargine  18 Units Subcutaneous  QAM AC     lisinopril  5 mg Oral Daily     ranitidine  150 mg Oral BID     furosemide  20 mg Oral Daily     metoprolol  50 mg Oral BID     metFORMIN  500 mg Oral BID w/meals     clopidogrel  75 mg Oral Daily     acetaminophen  1,000 mg Oral TID     ascorbic acid (VITAMIN C) tablet 500 mg  500 mg Oral Daily     aspirin  325 mg Oral Daily     atorvastatin  40 mg Oral Daily     cefTRIAXone  2 g Intravenous Q12H     cholecalciferol  1,000 Units Oral Daily     tamsulosin  0.8 mg Oral At Bedtime     insulin aspart  1-7 Units Subcutaneous TID AC     insulin aspart  1-5 Units Subcutaneous At Bedtime     heparin lock flush  5-10 mL Intracatheter Q24H       PRN meds:  QUEtiapine, diphenhydrAMINE **OR** diphenhydrAMINE, tramadol, senna-docusate, polyethylene glycol, bisacodyl, - MEDICATION INSTRUCTIONS -, lidocaine, glucose **OR** dextrose **OR** glucagon, naloxone, sodium chloride (PF), heparin lock flush      Hyun Koehler MD  Physical Medicine and Rehabilitation     I spent a total of 20 minutes face-to-face or managing the care of Tad Manning. Over 50% of my time on the unit was spent counseling the patient and coordinating care. See note for details.

## 2017-03-04 NOTE — PROGRESS NOTES
"[facility], [place]   Internal Medicine Daily Note           Interval History/Events     Hand off taken from Dr. Christine  Overnight events reviewed  Reports doing well  No nausea, vomiting, chest pain, shortness of breath, lightheadedness or dizzines  No fever, chills.          Review of Systems        4 point ROS including Respiratory, CV, GI and , other than that noted above is negative      Medications   I have reviewed current medications  in the \"current medication\" section of Epic.  Relevant changes include:     Physical Exam   General:       Vital signs:    Blood pressure 148/76, pulse 95, temperature 97.4  F (36.3  C), temperature source Oral, resp. rate 16, height 1.829 m (6'), weight 90.8 kg (200 lb 1.6 oz), SpO2 92 %.  Estimated body mass index is 27.14 kg/(m^2) as calculated from the following:    Height as of this encounter: 1.829 m (6').    Weight as of this encounter: 90.8 kg (200 lb 1.6 oz).      Intake/Output Summary (Last 24 hours) at 03/04/17 1010  Last data filed at 03/04/17 0551   Gross per 24 hour   Intake                0 ml   Output              750 ml   Net             -750 ml      HEENT: No icterus, no pallor  Cardiovascular: S1, S2 normal.  Respiratory:   B/L CTA  GI/Abdomen: Soft, NT, BS+  Neurology: Alert, awake, and oriented. No tremors.   Extremities: No pretibial edema.   Skin: Callous on the right lateral sole.      Laboratory and Imaging Studies     I have reviewed  laboratory and imaging studies in the Epic. Pertinent findings are as below:    BMP  Recent Labs  Lab 03/03/17  0734 03/01/17  0642 02/27/17  0534 02/26/17  0540    140 141 139   POTASSIUM 4.0 3.6 3.7 3.6   CHLORIDE 106 107 106 105   JUSTINO 8.1* 7.9* 7.8* 8.0*   CO2 24 27 28 25   BUN 11 10 12 13   CR 0.56* 0.55* 0.63* 0.61*   * 94 97 126*     CBC  Recent Labs  Lab 02/27/17  0534   WBC 5.0   RBC 3.07*   HGB 8.3*   HCT 27.5*   MCV 90   MCH 27.0   MCHC 30.2*   RDW 17.4*    "     INRNo lab results found in last 7 days.  LFTs  Recent Labs  Lab 02/27/17  0534   ALKPHOS 137   AST 19   ALT 22   BILITOTAL 0.3   PROTTOTAL 4.9*   ALBUMIN 1.9*      PANCNo lab results found in last 7 days.        Impression/Plan        # Acute systolic CHF: Started on Furosemide 20 mg daily 2/26  Echo on 02/24 showed decreased LVEF and severe global hypokinesis.Lisinopril was started and then titrated up to  5 mg daily 2/28/2017  - Continue Furosemide, Metoprolol  - Titrate Lisinopril to 10 mg once daily over few days if SBP > 120/80 mm Hg consistently.   - FOllow up with [facility] cardiology in one month after discharge with repeat Echo to assess EF and rate control.   - Strict I/O . Monitor   - Intermittent BMP.       # Orthostatic Dizziness and Tachycardia: worse on tilt table : Improved  # Tachy w PAC's and fusion comlex on EKG 2/22: Atrial flutter with variable block 2/23 Cardiology was consulted. Atenolol was changed to Metoprolol bid 2/24 with improvement. 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   The patient should have a CBC, CRP, creatinine and LFTs done weekly.   Followup with Infectious Disease, Dr. Gillian Max on 03/23/2017.    # Diabetes mellitus: Continue the Glargine at night along with Correctional Insulin.   Glargine was increased to  20 units 2/19, and then decreased to 18 Units on 03/01. Metformin (home medication) was started on 02/20  3/1/2017: decrease 18 Units.   - Continue Basal, Correctional, and OHA regimen  - Blood sugar before meals, and bed time.      # PVD: S/P B/L  lower extremity stents  On Aspirin, and Clopidogrel (started 2/20)   - Continue     # GI Prophylaxis: Ranitidine for GI ppx- 2/27   # CML in remission:  last chemo was 09/2015. Follow up with Oncology.   # CAD: Continue  Aspirin 325 mg daily, Atorvastatin 40 mg daily, Metoprolol.   # Spinal cord compression with lower extremity weakness: plan per Primary team                        Jayjay Gr MD  Hospitalist ( Internal medicine)  Pager: [phone]

## 2017-03-05 LAB
GLUCOSE BLDC GLUCOMTR-MCNC: 112 MG/DL (ref 70–99)
GLUCOSE BLDC GLUCOMTR-MCNC: 135 MG/DL (ref 70–99)
GLUCOSE BLDC GLUCOMTR-MCNC: 138 MG/DL (ref 70–99)
GLUCOSE BLDC GLUCOMTR-MCNC: 95 MG/DL (ref 70–99)

## 2017-03-05 PROCEDURE — 97110 THERAPEUTIC EXERCISES: CPT | Mod: GP | Performed by: PHYSICAL THERAPIST

## 2017-03-05 PROCEDURE — 25000125 ZZHC RX 250: Performed by: PHYSICAL MEDICINE & REHABILITATION

## 2017-03-05 PROCEDURE — 25000128 H RX IP 250 OP 636: Performed by: INTERNAL MEDICINE

## 2017-03-05 PROCEDURE — 00000146 ZZHCL STATISTIC GLUCOSE BY METER IP

## 2017-03-05 PROCEDURE — 40000133 ZZH STATISTIC OT WARD VISIT

## 2017-03-05 PROCEDURE — 97535 SELF CARE MNGMENT TRAINING: CPT | Mod: GO

## 2017-03-05 PROCEDURE — 25000132 ZZH RX MED GY IP 250 OP 250 PS 637: Mod: GY | Performed by: INTERNAL MEDICINE

## 2017-03-05 PROCEDURE — 97530 THERAPEUTIC ACTIVITIES: CPT | Mod: GO

## 2017-03-05 PROCEDURE — 40000193 ZZH STATISTIC PT WARD VISIT: Performed by: PHYSICAL THERAPIST

## 2017-03-05 PROCEDURE — 25000128 H RX IP 250 OP 636

## 2017-03-05 PROCEDURE — 97530 THERAPEUTIC ACTIVITIES: CPT | Mod: GP | Performed by: PHYSICAL THERAPIST

## 2017-03-05 PROCEDURE — 25000132 ZZH RX MED GY IP 250 OP 250 PS 637: Mod: GY | Performed by: PHYSICAL MEDICINE & REHABILITATION

## 2017-03-05 PROCEDURE — A9270 NON-COVERED ITEM OR SERVICE: HCPCS | Mod: GY | Performed by: INTERNAL MEDICINE

## 2017-03-05 PROCEDURE — A9270 NON-COVERED ITEM OR SERVICE: HCPCS | Mod: GY | Performed by: PHYSICAL MEDICINE & REHABILITATION

## 2017-03-05 PROCEDURE — 97530 THERAPEUTIC ACTIVITIES: CPT | Mod: GP

## 2017-03-05 PROCEDURE — 40000193 ZZH STATISTIC PT WARD VISIT

## 2017-03-05 PROCEDURE — 97110 THERAPEUTIC EXERCISES: CPT | Mod: GO

## 2017-03-05 PROCEDURE — 97110 THERAPEUTIC EXERCISES: CPT | Mod: GP

## 2017-03-05 PROCEDURE — 12800006 ZZH R&B REHAB

## 2017-03-05 RX ORDER — SODIUM CHLORIDE 9 MG/ML
INJECTION, SOLUTION INTRAVENOUS
Status: COMPLETED
Start: 2017-03-05 | End: 2017-03-05

## 2017-03-05 RX ADMIN — METOPROLOL TARTRATE 50 MG: 50 TABLET, FILM COATED ORAL at 07:35

## 2017-03-05 RX ADMIN — CLOPIDOGREL BISULFATE 75 MG: 75 TABLET, FILM COATED ORAL at 07:35

## 2017-03-05 RX ADMIN — ACETAMINOPHEN 1000 MG: 500 TABLET, FILM COATED ORAL at 07:35

## 2017-03-05 RX ADMIN — TAMSULOSIN HYDROCHLORIDE 0.8 MG: 0.4 CAPSULE ORAL at 21:04

## 2017-03-05 RX ADMIN — MIRTAZAPINE 15 MG: 7.5 TABLET, FILM COATED ORAL at 21:04

## 2017-03-05 RX ADMIN — VITAMIN D, TAB 1000IU (100/BT) 1000 UNITS: 25 TAB at 07:35

## 2017-03-05 RX ADMIN — CEFTRIAXONE 2 G: 2 INJECTION, POWDER, FOR SOLUTION INTRAMUSCULAR; INTRAVENOUS at 21:04

## 2017-03-05 RX ADMIN — RANITIDINE HYDROCHLORIDE 150 MG: 150 TABLET, FILM COATED ORAL at 07:35

## 2017-03-05 RX ADMIN — SODIUM CHLORIDE, PRESERVATIVE FREE 5 ML: 5 INJECTION INTRAVENOUS at 00:44

## 2017-03-05 RX ADMIN — METFORMIN HYDROCHLORIDE 500 MG: 500 TABLET ORAL at 07:35

## 2017-03-05 RX ADMIN — METOPROLOL TARTRATE 50 MG: 50 TABLET, FILM COATED ORAL at 21:04

## 2017-03-05 RX ADMIN — CEFTRIAXONE 2 G: 2 INJECTION, POWDER, FOR SOLUTION INTRAMUSCULAR; INTRAVENOUS at 11:20

## 2017-03-05 RX ADMIN — RANITIDINE HYDROCHLORIDE 150 MG: 150 TABLET, FILM COATED ORAL at 21:04

## 2017-03-05 RX ADMIN — ACETAMINOPHEN 1000 MG: 500 TABLET, FILM COATED ORAL at 21:04

## 2017-03-05 RX ADMIN — ACETAMINOPHEN 1000 MG: 500 TABLET, FILM COATED ORAL at 14:42

## 2017-03-05 RX ADMIN — SODIUM CHLORIDE, PRESERVATIVE FREE 5 ML: 5 INJECTION INTRAVENOUS at 21:04

## 2017-03-05 RX ADMIN — OXYCODONE HYDROCHLORIDE AND ACETAMINOPHEN 500 MG: 500 TABLET ORAL at 07:35

## 2017-03-05 RX ADMIN — INSULIN GLARGINE 18 UNITS: 100 INJECTION, SOLUTION SUBCUTANEOUS at 07:36

## 2017-03-05 RX ADMIN — CEFTRIAXONE 2 G: 2 INJECTION, POWDER, FOR SOLUTION INTRAMUSCULAR; INTRAVENOUS at 00:01

## 2017-03-05 RX ADMIN — FUROSEMIDE 20 MG: 20 TABLET ORAL at 07:35

## 2017-03-05 RX ADMIN — ASPIRIN 325 MG ORAL TABLET 325 MG: 325 PILL ORAL at 07:35

## 2017-03-05 RX ADMIN — ATORVASTATIN CALCIUM 40 MG: 40 TABLET, FILM COATED ORAL at 07:35

## 2017-03-05 RX ADMIN — METFORMIN HYDROCHLORIDE 500 MG: 500 TABLET ORAL at 17:21

## 2017-03-05 RX ADMIN — LISINOPRIL 5 MG: 5 TABLET ORAL at 07:35

## 2017-03-05 RX ADMIN — SODIUM CHLORIDE 500 ML: 9 INJECTION, SOLUTION INTRAVENOUS at 21:05

## 2017-03-05 NOTE — PLAN OF CARE
Problem: Goal/Outcome  Goal: Goal Outcome Summary  Functionally still demonstrating poor memory/cognition.  Did not recall in pm session what we had done in am session.  Not carrying over w/c management information from session to session.

## 2017-03-05 NOTE — PLAN OF CARE
Problem: Goal/Outcome  Goal: Goal Outcome Summary  Outcome: No Change  FOCUS/GOAL  Bowel management and Bladder management     ASSESSMENT, INTERVENTIONS AND CONTINUING PLAN FOR GOAL:  Pt denied pain, chest pain, change in neuro, SOB. Coccyx area still reddened, cream applied per POC and assist pt for turning side to side. Pt able to turn with min assist.   BGs 132 and 126. Pt had smear of incontinent of bm and spilled urinal x1 at HS, dependent on staff for incontinence care and gown and linen change.

## 2017-03-05 NOTE — PLAN OF CARE
Problem: Goal/Outcome  Goal: Goal Outcome Summary  Outcome: No Change  FOCUS/GOAL  Medical management     ASSESSMENT, INTERVENTIONS AND CONTINUING PLAN FOR GOAL:     Patient offered no statements of pain or discomfort this shift. Has been sleeping between cares. Continue plan of care.

## 2017-03-05 NOTE — PLAN OF CARE
Problem: Goal/Outcome  Goal: Goal Outcome Summary  Pt reports comfort with new seating system. Pt completed squat pivot w/c < > toilet at min-mod A. Continue working on BUE/core strength and endurance.

## 2017-03-05 NOTE — PLAN OF CARE
Problem: Goal/Outcome  Goal: Goal Outcome Summary  Alert and oriented x 4. Denies any pain, however on scheduled tylenol.  Back incision is intact, open to air. Continues with 2+ bilateral feet edema, sophy socks applied and on at this time. Continent of bladder utilizing a urinal independently. Staff assisting to empty urinal. Incontinent of bm, smear  x 1. Also continent of a medium BM on the toilet. Needed total assistance with perineal cares. Picc right upper extremity is patent and intact.

## 2017-03-06 LAB
ALBUMIN SERPL-MCNC: 2.1 G/DL (ref 3.4–5)
ALP SERPL-CCNC: 305 U/L (ref 40–150)
ALT SERPL W P-5'-P-CCNC: 69 U/L (ref 0–70)
ANION GAP SERPL CALCULATED.3IONS-SCNC: 8 MMOL/L (ref 3–14)
AST SERPL W P-5'-P-CCNC: 12 U/L (ref 0–45)
BILIRUB DIRECT SERPL-MCNC: 0.1 MG/DL (ref 0–0.2)
BILIRUB SERPL-MCNC: 0.4 MG/DL (ref 0.2–1.3)
BUN SERPL-MCNC: 11 MG/DL (ref 7–30)
CALCIUM SERPL-MCNC: 7.9 MG/DL (ref 8.5–10.1)
CHLORIDE SERPL-SCNC: 110 MMOL/L (ref 94–109)
CO2 SERPL-SCNC: 26 MMOL/L (ref 20–32)
CREAT SERPL-MCNC: 0.54 MG/DL (ref 0.66–1.25)
CRP SERPL-MCNC: 19.9 MG/L (ref 0–8)
ERYTHROCYTE [DISTWIDTH] IN BLOOD BY AUTOMATED COUNT: 17.7 % (ref 10–15)
GFR SERPL CREATININE-BSD FRML MDRD: ABNORMAL ML/MIN/1.7M2
GLUCOSE BLDC GLUCOMTR-MCNC: 101 MG/DL (ref 70–99)
GLUCOSE BLDC GLUCOMTR-MCNC: 127 MG/DL (ref 70–99)
GLUCOSE BLDC GLUCOMTR-MCNC: 152 MG/DL (ref 70–99)
GLUCOSE SERPL-MCNC: 115 MG/DL (ref 70–99)
HCT VFR BLD AUTO: 30.3 % (ref 40–53)
HGB BLD-MCNC: 9 G/DL (ref 13.3–17.7)
MCH RBC QN AUTO: 27 PG (ref 26.5–33)
MCHC RBC AUTO-ENTMCNC: 29.7 G/DL (ref 31.5–36.5)
MCV RBC AUTO: 91 FL (ref 78–100)
PLATELET # BLD AUTO: 190 10E9/L (ref 150–450)
POTASSIUM SERPL-SCNC: 3.7 MMOL/L (ref 3.4–5.3)
PROT SERPL-MCNC: 5 G/DL (ref 6.8–8.8)
RBC # BLD AUTO: 3.33 10E12/L (ref 4.4–5.9)
SODIUM SERPL-SCNC: 144 MMOL/L (ref 133–144)
WBC # BLD AUTO: 4.8 10E9/L (ref 4–11)

## 2017-03-06 PROCEDURE — 80076 HEPATIC FUNCTION PANEL: CPT | Performed by: INTERNAL MEDICINE

## 2017-03-06 PROCEDURE — 25000128 H RX IP 250 OP 636: Performed by: INTERNAL MEDICINE

## 2017-03-06 PROCEDURE — 97535 SELF CARE MNGMENT TRAINING: CPT | Mod: GO

## 2017-03-06 PROCEDURE — 25000132 ZZH RX MED GY IP 250 OP 250 PS 637: Mod: GY | Performed by: INTERNAL MEDICINE

## 2017-03-06 PROCEDURE — 25000132 ZZH RX MED GY IP 250 OP 250 PS 637: Mod: GY | Performed by: PHYSICAL MEDICINE & REHABILITATION

## 2017-03-06 PROCEDURE — 36592 COLLECT BLOOD FROM PICC: CPT | Performed by: INTERNAL MEDICINE

## 2017-03-06 PROCEDURE — A9270 NON-COVERED ITEM OR SERVICE: HCPCS | Mod: GY | Performed by: INTERNAL MEDICINE

## 2017-03-06 PROCEDURE — 25000125 ZZHC RX 250: Performed by: PHYSICAL MEDICINE & REHABILITATION

## 2017-03-06 PROCEDURE — 85027 COMPLETE CBC AUTOMATED: CPT | Performed by: INTERNAL MEDICINE

## 2017-03-06 PROCEDURE — 97530 THERAPEUTIC ACTIVITIES: CPT | Mod: GP

## 2017-03-06 PROCEDURE — 97110 THERAPEUTIC EXERCISES: CPT | Mod: GO

## 2017-03-06 PROCEDURE — 97110 THERAPEUTIC EXERCISES: CPT | Mod: GP

## 2017-03-06 PROCEDURE — 80048 BASIC METABOLIC PNL TOTAL CA: CPT | Performed by: INTERNAL MEDICINE

## 2017-03-06 PROCEDURE — 86140 C-REACTIVE PROTEIN: CPT | Performed by: INTERNAL MEDICINE

## 2017-03-06 PROCEDURE — A9270 NON-COVERED ITEM OR SERVICE: HCPCS | Mod: GY | Performed by: PHYSICAL MEDICINE & REHABILITATION

## 2017-03-06 PROCEDURE — 99232 SBSQ HOSP IP/OBS MODERATE 35: CPT | Performed by: INTERNAL MEDICINE

## 2017-03-06 PROCEDURE — 12800006 ZZH R&B REHAB

## 2017-03-06 PROCEDURE — 00000146 ZZHCL STATISTIC GLUCOSE BY METER IP

## 2017-03-06 PROCEDURE — 40000193 ZZH STATISTIC PT WARD VISIT

## 2017-03-06 PROCEDURE — 40000133 ZZH STATISTIC OT WARD VISIT

## 2017-03-06 RX ORDER — LISINOPRIL 10 MG/1
10 TABLET ORAL DAILY
Status: DISCONTINUED | OUTPATIENT
Start: 2017-03-07 | End: 2017-03-19 | Stop reason: HOSPADM

## 2017-03-06 RX ORDER — LISINOPRIL 5 MG/1
5 TABLET ORAL ONCE
Status: COMPLETED | OUTPATIENT
Start: 2017-03-06 | End: 2017-03-06

## 2017-03-06 RX ADMIN — ACETAMINOPHEN 1000 MG: 500 TABLET, FILM COATED ORAL at 20:18

## 2017-03-06 RX ADMIN — ASPIRIN 325 MG ORAL TABLET 325 MG: 325 PILL ORAL at 07:53

## 2017-03-06 RX ADMIN — CLOPIDOGREL BISULFATE 75 MG: 75 TABLET, FILM COATED ORAL at 07:53

## 2017-03-06 RX ADMIN — CEFTRIAXONE 2 G: 2 INJECTION, POWDER, FOR SOLUTION INTRAMUSCULAR; INTRAVENOUS at 20:33

## 2017-03-06 RX ADMIN — FUROSEMIDE 20 MG: 20 TABLET ORAL at 07:53

## 2017-03-06 RX ADMIN — INSULIN GLARGINE 18 UNITS: 100 INJECTION, SOLUTION SUBCUTANEOUS at 07:58

## 2017-03-06 RX ADMIN — CEFTRIAXONE 2 G: 2 INJECTION, POWDER, FOR SOLUTION INTRAMUSCULAR; INTRAVENOUS at 09:29

## 2017-03-06 RX ADMIN — ATORVASTATIN CALCIUM 40 MG: 40 TABLET, FILM COATED ORAL at 07:53

## 2017-03-06 RX ADMIN — ACETAMINOPHEN 1000 MG: 500 TABLET, FILM COATED ORAL at 07:53

## 2017-03-06 RX ADMIN — RANITIDINE HYDROCHLORIDE 150 MG: 150 TABLET, FILM COATED ORAL at 07:53

## 2017-03-06 RX ADMIN — SODIUM CHLORIDE, PRESERVATIVE FREE 5 ML: 5 INJECTION INTRAVENOUS at 06:18

## 2017-03-06 RX ADMIN — VITAMIN D, TAB 1000IU (100/BT) 1000 UNITS: 25 TAB at 07:53

## 2017-03-06 RX ADMIN — TAMSULOSIN HYDROCHLORIDE 0.8 MG: 0.4 CAPSULE ORAL at 20:18

## 2017-03-06 RX ADMIN — METFORMIN HYDROCHLORIDE 500 MG: 500 TABLET ORAL at 18:11

## 2017-03-06 RX ADMIN — ACETAMINOPHEN 1000 MG: 500 TABLET, FILM COATED ORAL at 14:22

## 2017-03-06 RX ADMIN — LISINOPRIL 5 MG: 5 TABLET ORAL at 12:14

## 2017-03-06 RX ADMIN — METFORMIN HYDROCHLORIDE 500 MG: 500 TABLET ORAL at 07:53

## 2017-03-06 RX ADMIN — RANITIDINE HYDROCHLORIDE 150 MG: 150 TABLET, FILM COATED ORAL at 20:18

## 2017-03-06 RX ADMIN — SODIUM CHLORIDE, PRESERVATIVE FREE 5 ML: 5 INJECTION INTRAVENOUS at 21:27

## 2017-03-06 RX ADMIN — METOPROLOL TARTRATE 50 MG: 50 TABLET, FILM COATED ORAL at 07:54

## 2017-03-06 RX ADMIN — MIRTAZAPINE 15 MG: 7.5 TABLET, FILM COATED ORAL at 20:18

## 2017-03-06 RX ADMIN — OXYCODONE HYDROCHLORIDE AND ACETAMINOPHEN 500 MG: 500 TABLET ORAL at 07:53

## 2017-03-06 RX ADMIN — LISINOPRIL 5 MG: 5 TABLET ORAL at 07:54

## 2017-03-06 RX ADMIN — METOPROLOL TARTRATE 50 MG: 50 TABLET, FILM COATED ORAL at 20:19

## 2017-03-06 RX ADMIN — Medication 25 MG: at 00:59

## 2017-03-06 NOTE — PLAN OF CARE
Problem: Goal/Outcome  Goal: Goal Outcome Summary  Outcome: No Change  FOCUS/GOAL  Medical management     ASSESSMENT, INTERVENTIONS AND CONTINUING PLAN FOR GOAL:     Patient is able to make his needs known. Offered no statements of pain or discomfort this shift. Has been sleeping between cares. Continue plan of care.

## 2017-03-06 NOTE — PROGRESS NOTES
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 Medical co-morbidities include post-op pain, anemia, hyponatremia, and h/o bacteremia on IV antibiotic. PMH significant for DM with peripheral neuropathy, HTN, HLD, CAD s/p CABG, systolic and diastolic heart failure, and CLL in remission. Admission to acute inpatient rehab 2/15.     --Vitals stable. No lab today. BGs in good range.  --Continue ongoing medical management.   -will continue current regimen for sleep for now and consider to add lunesta to help with sleep maintenance.     -spoke with hospitalist team regarding the duration of treatment with lasix and also final regimen for BG management - awaiting recs.     --Continue therapies and plan of care. Making slow progress, barriers seem to be fatigue, poor endurance and limited carry over. Continue to work on transfers and ADLs at WC level as well as on standing and ambulation as tolerated.            Review of Systems:   Slept ok last night, but interrupted. Reviewed the plan for insomnia again today. Reports intermittent back pain. Strength in bilateral lower extremities improving but significant functional changes. Taste and appetite are improving.             Physical Exam:     Vitals:    03/05/17 0700 03/05/17 0726 03/05/17 1509 03/05/17 2107   BP:  152/78 143/64 128/56   BP Location:  Left arm Left arm Left arm   Pulse:  99 87 92   Resp:  16 18    Temp:  98.1  F (36.7  C) 96.6  F (35.9  C)    TempSrc:  Oral Oral    SpO2:  93% 95%    Weight: 89.4 kg (197 lb 3.2 oz)      Height:         Gen: NAD, sitting in WC  Heart: regular - irregular   Lungs: clear breath sounds b/l  Abd: soft and non-tender  Ext: wwp, trace edema  in BLE, no tenderness in calves. R PICC line intact.   MSK/neuro: alert and oriented. speech fluent. 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 Diffuse muscle atrophy.         Data:   Scheduled meds    mirtazapine  15 mg Oral At Bedtime     insulin glargine  18 Units Subcutaneous QAM AC     lisinopril  5 mg Oral Daily     ranitidine  150 mg Oral BID     furosemide  20 mg Oral Daily     metoprolol  50 mg Oral BID     metFORMIN  500 mg Oral BID w/meals     clopidogrel  75 mg Oral Daily     acetaminophen  1,000 mg Oral TID     ascorbic acid (VITAMIN C) tablet 500 mg  500 mg Oral Daily     aspirin  325 mg Oral Daily     atorvastatin  40 mg Oral Daily     cefTRIAXone  2 g Intravenous Q12H     cholecalciferol  1,000 Units Oral Daily     tamsulosin  0.8 mg Oral At Bedtime     insulin aspart  1-7 Units Subcutaneous TID AC     insulin aspart  1-5 Units Subcutaneous At Bedtime     heparin lock flush  5-10 mL Intracatheter Q24H       PRN meds:  QUEtiapine, diphenhydrAMINE **OR** diphenhydrAMINE, tramadol, senna-docusate, polyethylene glycol, bisacodyl, - MEDICATION INSTRUCTIONS -, lidocaine, glucose **OR** dextrose **OR** glucagon, naloxone, sodium chloride (PF), heparin lock flush      Hyun Koehler MD  Physical Medicine and Rehabilitation     I spent a total of 20 minutes face-to-face or managing the care of Tad Manning. Over 50% of my time on the unit was spent counseling the patient and coordinating care. See note for details.

## 2017-03-06 NOTE — PLAN OF CARE
Problem: Goal/Outcome  Goal: Goal Outcome Summary  Outcome: No Change  Pt is alert and oriented, able to make needs known, Ulcer on the sacrum, barrier cream applied. Education provided on pressure relief. Spinal incision is reddened but dry and healed. No incontinence episode noted, pt uses the urinal independently and staff empties. Consumed 100% of dinner. Edema on bilateral ankles, anti-embolism stockings on. No complaints of discomfort through the evening. Double lumen PICC on the right ac is intact and patent.     2205: Pt stayed dry throughout the evening but requested to have incontinent pad applied at . IV antibiotics infused via PICC line without difficulty.

## 2017-03-06 NOTE — PLAN OF CARE
Problem: Goal/Outcome  Goal: Goal Outcome Summary  PT: For B LE WB and upright position-standing frame x 8 minutes, time limited due to hypotension however pt did not report any symptoms; at 0 mins BP at 134/59, attempted to take BP at 3 mins but BP cuff not working and had to get a new one, at 8 mins BP at 99/54, discontinued standing frame. BP at end of session at 144/68.

## 2017-03-06 NOTE — PROGRESS NOTES
"[facility], [place]   Internal Medicine Daily Note           Interval History/Events     Overnight events reviewed  No fever, chills, nausea, vomiting  Reports doing well.  No lightheadedness or dizziness.          Review of Systems        4 point ROS including Respiratory, CV, GI and , other than that noted above is negative      Medications   I have reviewed current medications  in the \"current medication\" section of Epic.  Relevant changes include:     Physical Exam   General:       Vital signs:    Blood pressure 159/75, pulse 98, temperature 96.5  F (35.8  C), temperature source Oral, resp. rate 17, height 1.829 m (6'), weight 91.4 kg (201 lb 6.4 oz), SpO2 96 %.  Estimated body mass index is 27.31 kg/(m^2) as calculated from the following:    Height as of this encounter: 1.829 m (6').    Weight as of this encounter: 91.4 kg (201 lb 6.4 oz).      Intake/Output Summary (Last 24 hours) at 03/04/17 1010  Last data filed at 03/04/17 0551   Gross per 24 hour   Intake                0 ml   Output              750 ml   Net             -750 ml      HEENT: No icterus, no pallor  Cardiovascular: S1, S2 normal.  Respiratory:   B/L CTA  GI/Abdomen: Soft, NT, BS+  Neurology: Alert, awake, and oriented. No tremors.   Extremities: No pretibial edema.   Skin: Callous on the right lateral sole.      Laboratory and Imaging Studies     I have reviewed  laboratory and imaging studies in the Epic. Pertinent findings are as below:    BMP    Recent Labs  Lab 03/06/17  0629 03/03/17  0734 03/01/17  0642    139 140   POTASSIUM 3.7 4.0 3.6   CHLORIDE 110* 106 107   JUSTINO 7.9* 8.1* 7.9*   CO2 26 24 27   BUN 11 11 10   CR 0.54* 0.56* 0.55*   * 120* 94     CBC    Recent Labs  Lab 03/06/17  0629   WBC 4.8   RBC 3.33*   HGB 9.0*   HCT 30.3*   MCV 91   MCH 27.0   MCHC 29.7*   RDW 17.7*        INRNo lab results found in last 7 days.  LFTs    Recent Labs  Lab 03/06/17  0629   ALKPHOS 305*   AST 12 "   ALT 69   BILITOTAL 0.4   PROTTOTAL 5.0*   ALBUMIN 2.1*      PANCNo lab results found in last 7 days.        Impression/Plan        # Acute systolic CHF: Started on Furosemide 20 mg daily 2/26  Echo on 02/24 showed decreased LVEF and severe global hypokinesis.Lisinopril was started and then titrated up to  5 mg daily 2/28/2017  - Increase Lisinopril to 10 mg daily if BMP normal tomorrow (03/06)  - Continue Furosemide, Metoprolol   - FOllow up with [facility] cardiology in one month after discharge with repeat Echo to assess EF and rate control.   - Strict I/O . Monitor   - Intermittent BMP.       # Orthostatic Dizziness and Tachycardia: Worse on tilt table : Improved  # Tachy w PAC's and fusion comlex on EKG 2/22: Atrial flutter with variable block 2/23 Cardiology was consulted. Atenolol was changed to Metoprolol bid 2/24 with improvement. 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   The patient should have a CBC, CRP, creatinine and LFTs done weekly.   Followup with Infectious Disease, Dr. Gillian Max on 03/23/2017.    # Diabetes mellitus: Continue the Glargine at night along with Correctional Insulin.   Glargine was increased to  20 units 2/19, and then decreased to 18 Units on 03/01. Metformin (home medication) was started on 02/20  3/1/2017: decreased to 18 Units  Blood sugar controlled.   - Continue Basal, Correctional, and OHA regimen  - Blood sugar before meals, and bed time.      # PVD: S/P B/L  lower extremity stents  On Aspirin, and Clopidogrel (started 2/20)   - Continue     # GI Prophylaxis: Ranitidine for GI ppx- 2/27   # CML in remission:  last chemo was 09/2015. Follow up with Oncology.   # CAD: Continue Aspirin 325 mg daily, Atorvastatin 40 mg daily, Metoprolol.   # Spinal cord compression with lower  extremity weakness: plan per Primary team                        Jayjay Gr MD  Hospitalist ( Internal medicine)  Pager: [phone]

## 2017-03-06 NOTE — PROGRESS NOTES
"[facility], [place]   Internal Medicine Daily Note           Interval History/Events     Overnight events reviewed  No fever, chills, nausea, vomiting  Reports doing well.  No lightheadedness or dizziness.          Review of Systems        4 point ROS including Respiratory, CV, GI and , other than that noted above is negative      Medications   I have reviewed current medications  in the \"current medication\" section of Epic.  Relevant changes include:     Physical Exam   General:       Vital signs:    Blood pressure 159/75, pulse 98, temperature 96.5  F (35.8  C), temperature source Oral, resp. rate 17, height 1.829 m (6'), weight 91.4 kg (201 lb 6.4 oz), SpO2 96 %.  Estimated body mass index is 27.31 kg/(m^2) as calculated from the following:    Height as of this encounter: 1.829 m (6').    Weight as of this encounter: 91.4 kg (201 lb 6.4 oz).      Intake/Output Summary (Last 24 hours) at 03/04/17 1010  Last data filed at 03/04/17 0551   Gross per 24 hour   Intake                0 ml   Output              750 ml   Net             -750 ml      HEENT: No icterus, no pallor  Cardiovascular: S1, S2 normal.  Respiratory:   B/L CTA  GI/Abdomen: Soft, NT, BS+  Neurology: Alert, awake, and oriented. No tremors.   Extremities: No pretibial edema.   Skin: Callous on the right lateral sole.      Laboratory and Imaging Studies     I have reviewed  laboratory and imaging studies in the Epic. Pertinent findings are as below:    BMP    Recent Labs  Lab 03/06/17  0629 03/03/17  0734 03/01/17  0642    139 140   POTASSIUM 3.7 4.0 3.6   CHLORIDE 110* 106 107   JUSTINO 7.9* 8.1* 7.9*   CO2 26 24 27   BUN 11 11 10   CR 0.54* 0.56* 0.55*   * 120* 94     CBC    Recent Labs  Lab 03/06/17  0629   WBC 4.8   RBC 3.33*   HGB 9.0*   HCT 30.3*   MCV 91   MCH 27.0   MCHC 29.7*   RDW 17.7*        INRNo lab results found in last 7 days.  LFTs    Recent Labs  Lab 03/06/17  0629   ALKPHOS 305*   AST 12 "   ALT 69   BILITOTAL 0.4   PROTTOTAL 5.0*   ALBUMIN 2.1*      PANCNo lab results found in last 7 days.        Impression/Plan        # Acute systolic CHF: Started on Furosemide 20 mg daily 2/26  Echo on 02/24 showed decreased LVEF and severe global hypokinesis.Lisinopril was started and then titrated up to  5 mg daily 2/28/2017  - Increase Lisinopril to 10 mg daily  - Continue Furosemide, Metoprolol   - FOllow up with [facility] cardiology in one month after discharge with repeat Echo to assess EF and rate control.   - Strict I/O . Monitor   - Intermittent BMP.       # Orthostatic Dizziness and Tachycardia: Worse on tilt table : Improved  # Tachy w PAC's and fusion comlex on EKG 2/22: Atrial flutter with variable block 2/23 Cardiology was consulted. Atenolol was changed to Metoprolol bid 2/24 with improvement. 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   The patient should have a CBC, CRP, creatinine and LFTs done weekly.   CRP 19.9 on 03/06 and improving  Followup with Infectious Disease, Dr. Gillian Max on 03/23/2017.    # Diabetes mellitus: Continue the Glargine at night along with Correctional Insulin.   Glargine was increased to  20 units 2/19, and then decreased to 18 Units on 03/01. Metformin (home medication) was started on 02/20  3/1/2017: decreased to 18 Units  Blood sugar controlled.   - Continue Basal, Correctional, and OHA regimen  - Blood sugar before meals, and bed time.     # Abnormal liver chemistry: Elevated ALP with normal bilirubin, and transaminases. Denies any abdominal pain  - Check CMP in couple of days     # PVD: S/P B/L  lower extremity stents  On Aspirin, and Clopidogrel (started 2/20)   - Continue     # GI Prophylaxis: Ranitidine for GI ppx- 2/27   # CML in remission:  last chemo was 09/2015.  Follow up with Oncology.   # CAD: Continue Aspirin 325 mg daily, Atorvastatin 40 mg daily, Metoprolol.   # Spinal cord compression with lower extremity weakness: plan per Primary team                        Jayjay Gr MD  Hospitalist ( Internal medicine)  Pager: [phone]

## 2017-03-06 NOTE — PLAN OF CARE
Problem: Goal/Outcome  Goal: Goal Outcome Summary  Alert and oriented x 4. Denies any pain. Back incision is intact, open to air. Continues with 2+ bilateral feet edema, sophy socks are on. Continent of bladder utilizing a urinal independently. Staff assisting to empty urinal.  Picc right upper extremity is patent and intact.  Assist of 1 sba for transfers with a sliding board. Will continue with poc.

## 2017-03-07 LAB
GLUCOSE BLDC GLUCOMTR-MCNC: 122 MG/DL (ref 70–99)
GLUCOSE BLDC GLUCOMTR-MCNC: 131 MG/DL (ref 70–99)
GLUCOSE BLDC GLUCOMTR-MCNC: 132 MG/DL (ref 70–99)
GLUCOSE BLDC GLUCOMTR-MCNC: 98 MG/DL (ref 70–99)

## 2017-03-07 PROCEDURE — 97530 THERAPEUTIC ACTIVITIES: CPT | Mod: GP | Performed by: PHYSICAL THERAPIST

## 2017-03-07 PROCEDURE — A9270 NON-COVERED ITEM OR SERVICE: HCPCS | Mod: GY | Performed by: INTERNAL MEDICINE

## 2017-03-07 PROCEDURE — 40000193 ZZH STATISTIC PT WARD VISIT: Performed by: PHYSICAL THERAPIST

## 2017-03-07 PROCEDURE — 12800006 ZZH R&B REHAB

## 2017-03-07 PROCEDURE — 97530 THERAPEUTIC ACTIVITIES: CPT | Mod: GO

## 2017-03-07 PROCEDURE — A9270 NON-COVERED ITEM OR SERVICE: HCPCS | Mod: GY | Performed by: PHYSICAL MEDICINE & REHABILITATION

## 2017-03-07 PROCEDURE — 00000146 ZZHCL STATISTIC GLUCOSE BY METER IP

## 2017-03-07 PROCEDURE — 25000132 ZZH RX MED GY IP 250 OP 250 PS 637: Mod: GY | Performed by: PHYSICAL MEDICINE & REHABILITATION

## 2017-03-07 PROCEDURE — 97032 APPL MODALITY 1+ESTIM EA 15: CPT | Mod: GP | Performed by: PHYSICAL THERAPIST

## 2017-03-07 PROCEDURE — 25000132 ZZH RX MED GY IP 250 OP 250 PS 637: Mod: GY | Performed by: INTERNAL MEDICINE

## 2017-03-07 PROCEDURE — 97110 THERAPEUTIC EXERCISES: CPT | Mod: GP | Performed by: PHYSICAL THERAPIST

## 2017-03-07 PROCEDURE — 25000128 H RX IP 250 OP 636: Performed by: INTERNAL MEDICINE

## 2017-03-07 PROCEDURE — 99212 OFFICE O/P EST SF 10 MIN: CPT

## 2017-03-07 PROCEDURE — 97535 SELF CARE MNGMENT TRAINING: CPT | Mod: GO

## 2017-03-07 PROCEDURE — 25000125 ZZHC RX 250: Performed by: PHYSICAL MEDICINE & REHABILITATION

## 2017-03-07 PROCEDURE — 99232 SBSQ HOSP IP/OBS MODERATE 35: CPT | Performed by: INTERNAL MEDICINE

## 2017-03-07 PROCEDURE — 40000133 ZZH STATISTIC OT WARD VISIT

## 2017-03-07 RX ORDER — LANOLIN ALCOHOL/MO/W.PET/CERES
3 CREAM (GRAM) TOPICAL AT BEDTIME
Status: DISCONTINUED | OUTPATIENT
Start: 2017-03-07 | End: 2017-03-10

## 2017-03-07 RX ORDER — ACETAMINOPHEN 325 MG/1
650 TABLET ORAL EVERY 6 HOURS PRN
Status: DISCONTINUED | OUTPATIENT
Start: 2017-03-07 | End: 2017-03-19 | Stop reason: HOSPADM

## 2017-03-07 RX ADMIN — LISINOPRIL 10 MG: 10 TABLET ORAL at 07:27

## 2017-03-07 RX ADMIN — VITAMIN D, TAB 1000IU (100/BT) 1000 UNITS: 25 TAB at 07:27

## 2017-03-07 RX ADMIN — MIRTAZAPINE 15 MG: 7.5 TABLET, FILM COATED ORAL at 20:03

## 2017-03-07 RX ADMIN — ATORVASTATIN CALCIUM 40 MG: 40 TABLET, FILM COATED ORAL at 07:27

## 2017-03-07 RX ADMIN — INSULIN GLARGINE 18 UNITS: 100 INJECTION, SOLUTION SUBCUTANEOUS at 07:28

## 2017-03-07 RX ADMIN — METFORMIN HYDROCHLORIDE 500 MG: 500 TABLET ORAL at 18:15

## 2017-03-07 RX ADMIN — ACETAMINOPHEN 1000 MG: 500 TABLET, FILM COATED ORAL at 07:27

## 2017-03-07 RX ADMIN — OXYCODONE HYDROCHLORIDE AND ACETAMINOPHEN 500 MG: 500 TABLET ORAL at 07:27

## 2017-03-07 RX ADMIN — CLOPIDOGREL BISULFATE 75 MG: 75 TABLET, FILM COATED ORAL at 07:27

## 2017-03-07 RX ADMIN — RANITIDINE HYDROCHLORIDE 150 MG: 150 TABLET, FILM COATED ORAL at 07:27

## 2017-03-07 RX ADMIN — METOPROLOL TARTRATE 50 MG: 50 TABLET, FILM COATED ORAL at 07:27

## 2017-03-07 RX ADMIN — Medication 25 MG: at 00:15

## 2017-03-07 RX ADMIN — ACETAMINOPHEN 1000 MG: 500 TABLET, FILM COATED ORAL at 13:23

## 2017-03-07 RX ADMIN — TAMSULOSIN HYDROCHLORIDE 0.8 MG: 0.4 CAPSULE ORAL at 20:03

## 2017-03-07 RX ADMIN — METFORMIN HYDROCHLORIDE 500 MG: 500 TABLET ORAL at 07:27

## 2017-03-07 RX ADMIN — CEFTRIAXONE 2 G: 2 INJECTION, POWDER, FOR SOLUTION INTRAMUSCULAR; INTRAVENOUS at 09:40

## 2017-03-07 RX ADMIN — METOPROLOL TARTRATE 50 MG: 50 TABLET, FILM COATED ORAL at 20:03

## 2017-03-07 RX ADMIN — FUROSEMIDE 20 MG: 20 TABLET ORAL at 07:27

## 2017-03-07 RX ADMIN — RANITIDINE HYDROCHLORIDE 150 MG: 150 TABLET, FILM COATED ORAL at 20:03

## 2017-03-07 RX ADMIN — SODIUM CHLORIDE, PRESERVATIVE FREE 5 ML: 5 INJECTION INTRAVENOUS at 20:08

## 2017-03-07 RX ADMIN — MELATONIN TAB 3 MG 3 MG: 3 TAB at 23:04

## 2017-03-07 RX ADMIN — ASPIRIN 325 MG ORAL TABLET 325 MG: 325 PILL ORAL at 07:29

## 2017-03-07 RX ADMIN — ACETAMINOPHEN 650 MG: 325 TABLET, FILM COATED ORAL at 20:03

## 2017-03-07 RX ADMIN — SODIUM CHLORIDE, PRESERVATIVE FREE 5 ML: 5 INJECTION INTRAVENOUS at 20:40

## 2017-03-07 RX ADMIN — CEFTRIAXONE 2 G: 2 INJECTION, POWDER, FOR SOLUTION INTRAMUSCULAR; INTRAVENOUS at 20:02

## 2017-03-07 NOTE — PLAN OF CARE
Problem: Goal/Outcome  Goal: Goal Outcome Summary  Outcome: Improving  FOCUS/GOAL  Medical management, Mobility and Skin integrity     ASSESSMENT, INTERVENTIONS AND CONTINUING PLAN FOR GOAL:  BGs are within acceptable range does need a correction scale   Currently on IV antibiotic for back incision infection , infused with out a problem   Back incision is CDI ALISHA denies pain   Blanchable erythema on the buttocks and intergluteal cleft barrier cream applied after assisting pt with pericare , noted that the rt buttocks has an open area  which was a scabbed area   before , but now the first layer of the skin is gone and could see  A pinkish wound , wound care done and Mipelex dressing applied , will have a WOCN look at it for further treatment and management , encouraged frequent turning but sometimes does not want to do it .

## 2017-03-07 NOTE — PROGRESS NOTES
"[facility], [place]   Internal Medicine Daily Note           Interval History/Events     Overnight events reviewed  No fever, chills, nausea, vomiting  No pain   Reports not able to sleep well last night -\" may be I got an hour between 4 and 5 .         Review of Systems        4 point ROS including Respiratory, CV, GI and , other than that noted above is negative      Medications   I have reviewed current medications  in the \"current medication\" section of Epic.  Relevant changes include:     Physical Exam   General:       Vital signs:   Blood pressure 163/85, pulse 100, temperature 96.2  F (35.7  C), temperature source Oral, resp. rate 17, height 1.829 m (6'), weight 90.9 kg (200 lb 6.4 oz), SpO2 95 %.    Intake/Output Summary (Last 24 hours) at 03/07/17 1059  Last data filed at 03/07/17 0734   Gross per 24 hour   Intake              480 ml   Output              200 ml   Net              280 ml     HEENT: No icterus, no pallor  Cardiovascular: S1, S2 normal.  Respiratory:   B/L CTA  GI/Abdomen: Soft, NT, BS+  Neurology: Alert, awake, and oriented. No tremors.   Extremities: No pretibial edema.   Skin: Callous on the right lateral sole.      Laboratory and Imaging Studies     I have reviewed  laboratory and imaging studies in the Epic. Pertinent findings are as below:    BMP    Recent Labs  Lab 03/06/17  0629 03/03/17  0734 03/01/17  0642    139 140   POTASSIUM 3.7 4.0 3.6   CHLORIDE 110* 106 107   JUSTINO 7.9* 8.1* 7.9*   CO2 26 24 27   BUN 11 11 10   CR 0.54* 0.56* 0.55*   * 120* 94     CBC    Recent Labs  Lab 03/06/17  0629   WBC 4.8   RBC 3.33*   HGB 9.0*   HCT 30.3*   MCV 91   MCH 27.0   MCHC 29.7*   RDW 17.7*        INRNo lab results found in last 7 days.  LFTs    Recent Labs  Lab 03/06/17  0629   ALKPHOS 305*   AST 12   ALT 69   BILITOTAL 0.4   PROTTOTAL 5.0*   ALBUMIN 2.1*      PANCNo lab results found in last 7 days.        Impression/Plan        # Acute " systolic CHF: Started on Furosemide 20 mg daily 2/26  Echo on 02/24 showed decreased LVEF and severe global hypokinesis.Lisinopril was started and then titrated up to  5 mg daily 2/28/2017  - Increase Lisinopril to 10 mg daily on 3/6/17  - Continue Furosemide, Metoprolol   - SBP appears elevated this morning  Prior to taking medications , will re check it     - FOllow up with [facility] cardiology in one month after discharge with repeat Echo to assess EF and rate control.       # Orthostatic Dizziness and Tachycardia: Worse on tilt table : appears resolved   Reports being asymptomatic for one week   # Tachy w PAC's and fusion comlex on EKG 2/22: Atrial flutter with variable block 2/23 Cardiology was consulted. Atenolol was changed to Metoprolol bid 2/24 with improvement. 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   The patient should have a CBC, CRP, creatinine and LFTs done weekly.   CRP 19.9 on 03/06 and improving  Followup with Infectious Disease, Dr. Gillian Max on 03/23/2017.    # Diabetes mellitus: Continue the Glargine at night along with Correctional Insulin.   Glargine was increased to  20 units 2/19, and then decreased to 18 Units on 03/01 Metformin (home medication) was started on 02/20  Blood sugar controlled.   - Continue Basal, Correctional, and OHA regimen  - Blood sugar before meals, and bed time.     Recent Labs  Lab 03/07/17  0717 03/06/17  2134 03/06/17  1709 03/06/17  1210 03/06/17  0629 03/05/17  2101 03/05/17  1720  03/03/17  0734  03/01/17  0642   GLC  --   --   --   --  115*  --   --   --  120*  --  94   * 152* 127* 101*  --  138* 135*  < > 115*  < >  --    < > = values in this interval not displayed.  # Abnormal liver chemistry: Elevated ALP with normal bilirubin, and transaminases. Denies any  abdominal pain  - Check liver functions  on Thursday      # PVD: S/P B/L  lower extremity stents  On Aspirin, and Clopidogrel (started 2/20)   Stable   # GI Prophylaxis: Ranitidine for GI ppx- 2/27   # CML in remission:  last chemo was 09/2015. Follow up with Oncology.   # CAD: Continue Aspirin 325 mg daily, Atorvastatin 40 mg daily, Metoprolol.   # Spinal cord compression with lower extremity weakness: plan per Primary team   # insomnia : using PRN Seroquel, will change to Melatonin 3 mg at bed time.            Juan Galindo MD  Hospitalist ( Internal medicine)  Pager: [phone]

## 2017-03-07 NOTE — PROGRESS NOTES
Skilled set up on :  Pt dependent for proper placement of electrodes on (B) gastroc, ant tib, hamstring, quad for optimum muscle contraction, safe positioning on w/c within frame and cushion for prevention of skin breakdown, feet secured to foot pedals, w/c secured to  w/ Q-straints.  Passive motion assessed to ensure proper positioning.  Determined appropriate FES parameters for each muscle group based off of strong tetanic response of muscle test.    Pt performed 2 x 10 minutes of active FES ergometry with min=max stimulation applied to above muscles at  35 rpm with  .5 nm resistance.  This PT adjusted e-stim and cycling parameters in real-time to ensure palpable muscle contractions throughout session.  Please see [email or web address] for further details on patient's stimulation parameters and ergometry outcomes.  Patient ID:[id number], PIN: [id number].    Needed rest break due to calf fatigue.  Extra time needed for (B) set up.

## 2017-03-07 NOTE — PLAN OF CARE
Problem: Goal/Outcome  Goal: Goal Outcome Summary  Progressing w/ bed mobility and able to get LE's in /out, ongoing cues for sequencing and initiation.

## 2017-03-07 NOTE — PLAN OF CARE
Problem: Goal/Outcome  Goal: Goal Outcome Summary  Outcome: No Change  Patient is alert & oriented, able to communicate needs. Denies pain/discomfort. Prn  Seroquel given at midnight per patient request.  Patient has been sleeping between cares. Use urinal at bedside, Staff empties.  BLE elevated on the pillows. Call light within reach. Bed alarm on for safety. Continue to monitor per care plan.

## 2017-03-07 NOTE — PLAN OF CARE
Problem: Goal/Outcome  Goal: Goal Outcome Summary  Patient is alert and oriented x 4. Denies any pain. Woc nurse  here to see pt re: suspected pressure ulcer to buttocks/ coccyx. Wrote in new orders. Back incision is intact, open to air. Continues with 2+ bilateral feet edema, sophy socks are on. Continent of bladder utilizing a urinal independently. Staff assisting to empty urinal. Picc right upper extremity is patent and intact. Assist of 1 sba for transfers with a sliding board.

## 2017-03-07 NOTE — PROGRESS NOTES
[facility]  WOC Nurse Inpatient Skin Assessment     Follow up  Assessment of:   Coccyx      Data:   Patient History:     Per MD note(s):  76 year old male with CAD, DM with neuropathy, HTN, PVD with bilateral LE stents on ASA/plvx, Chronic lymphocytic leukemia in remission (last chemo in 2015) who presents after a fall on  and R > L leg weakness and inability to ambulate. His story is inconsistent and somewhat tangential but he clearly articulates that 72 hours prior he was able to ambulation but now he cannot. 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     [facility] was consulted to evaluate and treat a coccyx pressure injury. ( he was last assessesed by our service on 17 and found to have intact coccyx at that time)      Moisture Management:  Incontinence Protocol        Current Diet / Nutrition:         Active Diet Order      Combination Diet 5013-8110 Calories: Moderate Consistent CHO (4-6 CHO units/meal); Thin Liquids (water, ice chips, juice, milk, gelatin, ice cream, etc)            Other     Roberto Assessment and sub scores:   Roberto Score  Av.5  Min: 12  Max: 20       Labs:        Recent Labs   Lab Test  17   1559  17   0754   17   0835   02/10/17   0230   17   0740   ALBUMIN   --    --    --    --    --    --    --   2.7*   HGB   --   7.7*   < >  8.2*   < >  8.3*   < >  8.9*   INR   --    --    --   1.22*   --    --    < >   --    WBC   --   7.8   < >   --    < >  8.8   < >  6.7   A1C   --    --    --    --    --   6.1*   --   7.0*   CRP  127.0*   --    --    --    --   140.0*   < >   --     < > = values in this interval not displayed.       Skin Assessment (location):   Perianal region  History:  Incontinence; immobility      Skin: improving erythema and exfoliating epidermis in the immediate area encirciling buttocks  just above hilton cleft    Color: pink    Temperature  warm    Drainage:      Amount: none     Color: pink, easily blanchable    Odor: none    Pain:  minimal, dull    Wound Location:  Right buttock  Wound History: Bilateral buttocks had dry, peeling epidermis last week with two larger areas.  Left buttock resolved.  Right buttock peeled with wound remaining likely due to friction as it is not over a bony prominence.      Measurements (length x width x depth, in cm) 0.4 x 1.4 x 0.1 cm  Wound Base:  **% dermis and small spot of fibrin  Palpation of the wound bed: normal   Periwound skin: intact and dry/scaly  Color: pink  Temperature: normal   Drainage: scant  Description of drainage: serous  Odor: none  Pain:  Pt reports it is tender when he lays on right side too much           Intervention:     Patient's chart evaluated.      Cares performed:    Orders  Written    Supplies  Reviewed    Discussed plan of care with Patient and Nurse          Assessment:   Moisture  associated dermatitis to perineum   Friction wound to right fleshy buttock        Plan:   Nursing to notify the Provider(s) and re-consult the WOC Nurse if skin deteriorate(s).  Skin care plan for Perinuem: Daily and as needde :  Cleanse with Mahsa Cleanse and Protect daily and with each episode of incontinence. Apply a thin layer of Criticaid paste around rectum- Do Not Apply to Buttocks. Apply Sween 24 to dry, peeling skin on sacrum and buttocks. Apply twice daily.  Plan of care for right buttocks: Cleanse with Mahsa Cleanse and Protect every other day.  Apply mepilex over open wound.  Remind pt to lift buttocks to reposition, try not to slide.      WOC Nurse will return: weekly  Face to face time: 20 minutes

## 2017-03-08 LAB
GLUCOSE BLDC GLUCOMTR-MCNC: 108 MG/DL (ref 70–99)
GLUCOSE BLDC GLUCOMTR-MCNC: 115 MG/DL (ref 70–99)
GLUCOSE BLDC GLUCOMTR-MCNC: 128 MG/DL (ref 70–99)
GLUCOSE BLDC GLUCOMTR-MCNC: 137 MG/DL (ref 70–99)

## 2017-03-08 PROCEDURE — 99211 OFF/OP EST MAY X REQ PHY/QHP: CPT

## 2017-03-08 PROCEDURE — 97110 THERAPEUTIC EXERCISES: CPT | Mod: GP

## 2017-03-08 PROCEDURE — 00000146 ZZHCL STATISTIC GLUCOSE BY METER IP

## 2017-03-08 PROCEDURE — 25000132 ZZH RX MED GY IP 250 OP 250 PS 637: Mod: GY | Performed by: INTERNAL MEDICINE

## 2017-03-08 PROCEDURE — 97110 THERAPEUTIC EXERCISES: CPT | Mod: GO

## 2017-03-08 PROCEDURE — 97530 THERAPEUTIC ACTIVITIES: CPT | Mod: GP

## 2017-03-08 PROCEDURE — 97535 SELF CARE MNGMENT TRAINING: CPT | Mod: GO

## 2017-03-08 PROCEDURE — 25000132 ZZH RX MED GY IP 250 OP 250 PS 637: Mod: GY | Performed by: PHYSICAL MEDICINE & REHABILITATION

## 2017-03-08 PROCEDURE — A9270 NON-COVERED ITEM OR SERVICE: HCPCS | Mod: GY | Performed by: INTERNAL MEDICINE

## 2017-03-08 PROCEDURE — 40000193 ZZH STATISTIC PT WARD VISIT

## 2017-03-08 PROCEDURE — 40000133 ZZH STATISTIC OT WARD VISIT

## 2017-03-08 PROCEDURE — A9270 NON-COVERED ITEM OR SERVICE: HCPCS | Mod: GY | Performed by: PHYSICAL MEDICINE & REHABILITATION

## 2017-03-08 PROCEDURE — 12800006 ZZH R&B REHAB

## 2017-03-08 PROCEDURE — 25000125 ZZHC RX 250: Performed by: PHYSICAL MEDICINE & REHABILITATION

## 2017-03-08 PROCEDURE — 25000128 H RX IP 250 OP 636: Performed by: INTERNAL MEDICINE

## 2017-03-08 RX ADMIN — METOPROLOL TARTRATE 50 MG: 50 TABLET, FILM COATED ORAL at 08:43

## 2017-03-08 RX ADMIN — METFORMIN HYDROCHLORIDE 500 MG: 500 TABLET ORAL at 17:55

## 2017-03-08 RX ADMIN — METOPROLOL TARTRATE 50 MG: 50 TABLET, FILM COATED ORAL at 21:39

## 2017-03-08 RX ADMIN — CEFTRIAXONE 2 G: 2 INJECTION, POWDER, FOR SOLUTION INTRAMUSCULAR; INTRAVENOUS at 21:31

## 2017-03-08 RX ADMIN — CLOPIDOGREL BISULFATE 75 MG: 75 TABLET, FILM COATED ORAL at 08:42

## 2017-03-08 RX ADMIN — OXYCODONE HYDROCHLORIDE AND ACETAMINOPHEN 500 MG: 500 TABLET ORAL at 08:42

## 2017-03-08 RX ADMIN — MIRTAZAPINE 15 MG: 7.5 TABLET, FILM COATED ORAL at 21:38

## 2017-03-08 RX ADMIN — CEFTRIAXONE 2 G: 2 INJECTION, POWDER, FOR SOLUTION INTRAMUSCULAR; INTRAVENOUS at 08:49

## 2017-03-08 RX ADMIN — SODIUM CHLORIDE, PRESERVATIVE FREE 5 ML: 5 INJECTION INTRAVENOUS at 22:15

## 2017-03-08 RX ADMIN — ASPIRIN 325 MG ORAL TABLET 325 MG: 325 PILL ORAL at 08:42

## 2017-03-08 RX ADMIN — TAMSULOSIN HYDROCHLORIDE 0.8 MG: 0.4 CAPSULE ORAL at 21:43

## 2017-03-08 RX ADMIN — RANITIDINE HYDROCHLORIDE 150 MG: 150 TABLET, FILM COATED ORAL at 08:42

## 2017-03-08 RX ADMIN — RANITIDINE HYDROCHLORIDE 150 MG: 150 TABLET, FILM COATED ORAL at 21:39

## 2017-03-08 RX ADMIN — METFORMIN HYDROCHLORIDE 500 MG: 500 TABLET ORAL at 08:42

## 2017-03-08 RX ADMIN — VITAMIN D, TAB 1000IU (100/BT) 1000 UNITS: 25 TAB at 08:42

## 2017-03-08 RX ADMIN — LISINOPRIL 10 MG: 10 TABLET ORAL at 08:43

## 2017-03-08 RX ADMIN — ATORVASTATIN CALCIUM 40 MG: 40 TABLET, FILM COATED ORAL at 08:42

## 2017-03-08 RX ADMIN — MELATONIN TAB 3 MG 3 MG: 3 TAB at 22:39

## 2017-03-08 RX ADMIN — INSULIN GLARGINE 18 UNITS: 100 INJECTION, SOLUTION SUBCUTANEOUS at 07:04

## 2017-03-08 RX ADMIN — FUROSEMIDE 20 MG: 20 TABLET ORAL at 08:43

## 2017-03-08 NOTE — PLAN OF CARE
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 PMH significant for DM with peripheral neuropathy, HTN, HLD, CAD s/p CABG, systolic and diastolic heart failure, and CLL in remission.   Safety: Patient is alert and oriented, he uses call light appropriately, able to direct his cares. Pivot transfer with assist of 2.    Pain: He denied pain.    Medications, Skin, Tubes/Lines: He takes medication whole with water. He needs to pass MAP before discharge. Back incision is healed. Open areas on coccyx and right buttock, covered with mepilex. Scabbed wound on right lateral foot, ALISHA. Double lumen PICC right upper arm for IV antibiotic. Pt's SO already attended [facility] PICC cares and IV meds class.    Swallowing/Nutrition: on regular diet.     Bowel/Bladder: Continent of B/B. Uses urinal and toilet or bedpan. LBM on 3/8/17.    Psychosocial: Pt resides with his SO. Goal to return home with continued support. Team working towards a safe d/c plan.    ADLs/IADLs: Patient able to demonstrates FB dressing, grooming and bed mobility with supervision and intermittent cues for sequences and safety. Patient able to complete squat pivot transfer w/c <> toilet, w/c <> bed with CGA and assist with set up of w/c. Continues with some difficulty with self propelling w/c in tight spaces such as bathroom.   Awaiting home measurements on bathroom doorway width and shower measurements for fit for  AE. Patient will require AE for d/c home, tub transfer bench, BSC, grab bar installation at toilet and shower. Recommend ongoing assist with IADLs; such as medication management, money management, and transportation.     Mobility: Pt making steady progress towards goals. Pt mod A x 1 squat pivot transfer w/ use of transfer belt, continues to require VC for proper technique. Pt min-mod A for bed mobility, VC for technique required, instructed on utilizing transfer belt to improve independence with bed mobility at this time. Pt continues to be non ambulatory and is limited by decreased tolerance to upright position at this time.     Cognition/Language: Patient continues to requires cues for initiation, sequencing and safety awareness. Recommend ongoing assist with IADLs; such as medication management, money management, and transportation.     Community Re-Entry:  Pt will be non ambulatory for community distances, will require WC for mobility.     Transportation: Recommend assist with transportation, anticipate car transfer not a barrier at time of d/c     Decision maker: self    Plan of Care and goals reviewed and updated.    Discharge Plan/Recommendations    Fall Precautions: continue    Overall plan for the patient: Tad has been making slow and steady progress with therapies; his transfers are more consistent now and is tolerating standing/tilt table better. Although still significant weakness and muscle atrophy in his bilateral lower extremities. Long discussion with the team and Tad today about discharge planning. He can potentially go home at WC level and likely requiring some A/supervison; his goal to be able to be ambulate with FWW before going home which would require longer course of rehab if possible in the future. Will schedule a home eval and finalize the plan by the end of next week. Estimated discharge date will be 3/22-3/24.       Utilization Review and Continued Stay Justification    Medical  Necessity Criteria:    For any criteria that is not met, please document reason and plan for discharge, transfer, or modification of plan of care to address.    Requires intensive rehabilitation program to treat functional deficits?: Yes    Requires 3x per week or greater involvement of rehabilitation physician to oversee rehabilitation program?: Yes    Requires rehabilitation nursing interventions?: Yes    Patient is making functional progress?: Yes    There is a potential for additional functional progress? Yes    Patient is participating in therapy 3 hours per day a minimum of 5 days per week or 15 hours per week in 7 day period?:Yes    Has discharge needs that require coordinated discharge planning approach?:Yes      Final Physician Sign off    Statement of Approval: I agree with all the recommendations detailed in this document.      Patient Goals     1. Pt will d/c home/community setting upon completion of therapy/RN goals.  2. Pt and family will be involved in d/c planning and will be made aware of services available to meet pts needs.        OT target date for goal attainment: 03/16/17  OT Frequency: daily  min  OT goal: hygiene/grooming: independent  OT goal: upper body dressing: Independent, including set-up/clothing retrieval, within precautions  OT goal: lower body dressing: Modified independent, using adaptive equipment, including set-up/clothing retrieval  OT goal: upper body bathing: Supervision/stand-by assist, within precautions  OT goal: lower body bathing: Supervision/stand-by assist, with precautions (seated)  OT Goal: transfer: Modified independent, with assistive device  OT goal: toilet transfer/toileting: Modified independent, cleaning and garment management, toilet transfer, within precautions  OT goal: meal preparation: Modified independent, with simple meal preparation, within precautions (Wc based pending progress with amb)  OT goal: cognitive: Patient/caregiver will verbalize  understanding of cognitive assessment results/recommendations as needed for safe discharge planning  OT goal 1: Pt will be able to complete ADL and functiopnal mobility with useopf spinal precautions without cues for following precautions and restrictions.   OT goal 2: Pt will be I with UB HEP to impriove UB strength for functional mobiltiy, transfer, and bed mobility  OT/ALISHA face-to-face collaboration occurred, patient progress and plan of care reviewed.      PT target date for goal attainment: 03/16/17  PT Frequency: daily x 60 minutes  PT goal: bed mobility: Modified independent, Supine to/from sit, Rolling, Bridging, Within precautions  PT goal: transfers: Modified independent, Sit to/from stand, Bed to/from chair, Assistive device, Within precautions  PT goal: gait:  (w/c vs amb TBD)     PT goal: perform aerobic activity with stable cardiovascular response: 15 minutes, NuStep, continuous activity     PT goal 1: Patient will perform car transfer Mod I.   PT Goal 2: Patient will propel wheelchair Mod I x 300' in order to access the community.   PT Goal 3: Patient will be independent with HEP for strengthening and ROM in order to continuing working towards strength in home setting.   PT Goal 4: Patient will attend falls class in order to improve safety awareness in the home setting to prevent falls.      Patient Goal:  Pain Management: Pt will verbalize understanding of medications taken for pain and verbalize ways manage pain using alternative methods before discharge.  Goal: Wound Management: Pt's S.O. will be able to demonstrate ability to manage wound and skin cares on right buttocks until it heals.    Patient/Family Goal: Medication Management: Pt will be able to demonstrate ability to manage medication safely before discharge.    Goal: Skin Integrity: Pt will demonstrate proper ways to reposition self and verbalize different ways to prevent further skin break down .   Goal: Reinforcement of self care/ADL:  NA  Goal: Dress/Undress: NA   Individualized Goal 1: Pt's S.O. will be able to manage ability to manage IV antibiotic thru PICC before discharge.

## 2017-03-08 NOTE — PROGRESS NOTES
Briefly saw the patient  Chart reviewed   Di not examine the patient.  Reports sleeping 5 h last night   No acute events   Continue melatonin     Will see him in AM

## 2017-03-08 NOTE — PROGRESS NOTES
[facility]  WOC Nurse Inpatient Skin Assessment     Follow up  Assessment of:   Coccyx( seen yesterday )  Initial assessment of foot (right lateral)      Data:   Patient History:     Per MD note(s):  76 year old male with CAD, DM with neuropathy, HTN, PVD with bilateral LE stents on ASA/plvx, Chronic lymphocytic leukemia in remission (last chemo in 2015) who presents after a fall on  and R > L leg weakness and inability to ambulate. His story is inconsistent and somewhat tangential but he clearly articulates that 72 hours prior he was able to ambulation but now he cannot. 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     [facility] was consulted to evaluate and treat a coccyx pressure injury. ( he was last assessesed by our service on 17 and found to have intact coccyx at that time)      Moisture Management:  Incontinence Protocol        Current Diet / Nutrition:         Active Diet Order      Combination Diet 9384-0005 Calories: Moderate Consistent CHO (4-6 CHO units/meal); Thin Liquids (water, ice chips, juice, milk, gelatin, ice cream, etc)            Other     Roberto Assessment and sub scores:   Roberto Score  Av.5  Min: 12  Max: 20       Labs:        Recent Labs   Lab Test  17   1559  17   0754   17   0835   02/10/17   0230   17   0740   ALBUMIN   --    --    --    --    --    --    --   2.7*   HGB   --   7.7*   < >  8.2*   < >  8.3*   < >  8.9*   INR   --    --    --   1.22*   --    --    < >   --    WBC   --   7.8   < >   --    < >  8.8   < >  6.7   A1C   --    --    --    --    --   6.1*   --   7.0*   CRP  127.0*   --    --    --    --   140.0*   < >   --     < > = values in this interval not displayed.   Skin Assessment (location):  Right foot ( plantar aspect 5th metatarsal head)  HX:  Nurse noted callous to foot and  requesting assessment, patient had been seeing a podiatrist as outpatient for this problem, he has neuropathy  O.6 x 0.6cm dry white/brown callous with loose edges, no draininge no erythema, 5th toe also noted to have a 2-3mm flat red blister without and erythema    Skin Assessment (location):   Perianal region  History:  Incontinence; immobility      Skin: improving erythema and exfoliating epidermis in the immediate area encirciling buttocks just above  cleft    Color: pink    Temperature  warm    Drainage:      Amount: none     Color: pink, easily blanchable    Odor: none    Pain:  minimal, dull    Wound Location:  Right buttock  Wound History: Bilateral buttocks had dry, peeling epidermis last week with two larger areas.  Left buttock resolved.  Right buttock peeled with wound remaining likely due to friction as it is not over a bony prominence.      Measurements (length x width x depth, in cm) 0.4 x 1.4 x 0.1 cm  Wound Base:  **% dermis and small spot of fibrin  Palpation of the wound bed: normal   Periwound skin: intact and dry/scaly  Color: pink  Temperature: normal   Drainage: scant  Description of drainage: serous  Odor: none  Pain:  Pt reports it is tender when he lays on right side too much           Intervention:     Patient's chart evaluated.      Cares performed:    Orders  Written    Supplies  Reviewed    Discussed plan of care with Patient and Nurse          Assessment:   Pre exisiting neuropathic foot callous  Moisture  associated dermatitis to perineum   Friction wound to right fleshy buttock        Plan:   Nursing to notify the Provider(s) and re-consult the [facility] Nurse if skin deteriorate(s).  Skin care plan for Perinuem: Daily and as needde :  Cleanse with Mahsa Cleanse and Protect daily and with each episode of incontinence. Apply a thin layer of Criticaid paste around rectum- Do Not Apply to Buttocks. Apply Sween 24 to dry, peeling skin on sacrum and buttocks. Apply twice daily.  Plan of  care for right buttocks: Cleanse with Mahsa Cleanse and Protect every other day.  Apply mepilex over open wound.  Remind pt to lift buttocks to reposition, try not to slide.    Feet :Daily  Apply Sween 24 lotion to callous and  dry skin     WOC Nurse will return: weekly  Face to face time: 5  minutes

## 2017-03-08 NOTE — PLAN OF CARE
Problem: Goal/Outcome  Goal: Goal Outcome Summary  PT: Pt engaged in tilt table to improve BLE WB as well as tolerance to upright positioning. Pt tolerated for 12.5 minutes. Progressed from 15* > 70*, /86 supine, 132/74 at 70*. Pt engaged in WC mobility, squat pivot transfers, and LE strengthening exercises

## 2017-03-08 NOTE — PROGRESS NOTES
[facility]   Acute Rehabilitation Unit  Daily progress note    interval history  Tad Manning was seen and examined at bedside. Slept for 5 hours last night and feels better today.     Team rounds held today; please see separate document in Plan of Care tab for full details. Tad has been making slow and steady progress with therapies; his transfers are more consistent now and is tolerating standing/tilt table better. Although still significant weakness and muscle atrophy in his bilateral lower extremities. Long discussion with the team and Tad today about discharge planning. He can potentially go home at  level and likely requiring some A/supervison; his goal to be able to be ambulate with FWW before going home which would require longer course of rehab if possible in the future. Will schedule a home eval and finalize the plan by the end of next week. Estimated discharge date will be 3/22-3/24.         medications    melatonin  3 mg Oral At Bedtime     lisinopril  10 mg Oral Daily     mirtazapine  15 mg Oral At Bedtime     insulin glargine  18 Units Subcutaneous QAM AC     ranitidine  150 mg Oral BID     furosemide  20 mg Oral Daily     metoprolol  50 mg Oral BID     metFORMIN  500 mg Oral BID w/meals     clopidogrel  75 mg Oral Daily     ascorbic acid (VITAMIN C) tablet 500 mg  500 mg Oral Daily     aspirin  325 mg Oral Daily     atorvastatin  40 mg Oral Daily     cefTRIAXone  2 g Intravenous Q12H     cholecalciferol  1,000 Units Oral Daily     tamsulosin  0.8 mg Oral At Bedtime     insulin aspart  1-7 Units Subcutaneous TID AC     insulin aspart  1-5 Units Subcutaneous At Bedtime     heparin lock flush  5-10 mL Intracatheter Q24H     acetaminophen, diphenhydrAMINE **OR** diphenhydrAMINE, tramadol, senna-docusate, polyethylene glycol, bisacodyl, - MEDICATION INSTRUCTIONS -, lidocaine, glucose **OR** dextrose **OR** glucagon, naloxone, sodium chloride (PF),  heparin lock flush       physical exam  /78 (BP Location: Left arm)  Pulse 93  Temp 95.6  F (35.3  C) (Oral)  Resp 18  Ht 1.829 m (6')  Wt 91.3 kg (201 lb 3.2 oz)  SpO2 96%  BMI 27.29 kg/m2     Gen: NAD, resting in bed   HEENT: has glasses   Heart: RRR  Pulm: clear breath sounds b/l  Abd: soft and non-tender    Neuro/MSK: unchanged   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 Medical co-morbidities include post-op pain, anemia, hyponatremia, and h/o bacteremia on IV antibiotic. PMH significant for DM with peripheral neuropathy, HTN, HLD, CAD s/p CABG, systolic and diastolic heart failure, and CLL in remission.      Functional deficits are weakness in bilateral lower extremities, sensory loss in feet (due to diabetic neuropathy but worse likely due to SCI), neurogenic bladder and bowel, and acute on chronic deconditioning with poor endurance.         Admission to acute inpatient rehab 2/15.     Rehabilitation: please see interval history for updates. Ordered SLP on 2/16 given his low score on SLUMS 23/30. SLP d/c on 3/2, OT increase to  min/day.      Medical Conditions; appreciate hospitalist team assistance in medical management.      # Incomplete SCI:  # Thoracic osteomyelitis/discitis with epidural abscess:  # S/p T9-10 laminectomy, T7-12 posterior arthrodesis and TF right sided T9-10 fusion.   # Strep mitis bacteremia:      --TLSO for comfort  --wound care per recs; sutures removed at 2 weeks higinio on 2/28.  --PICC line care on the right arm   -nurse reported green discharge 2/22, no clinical s/s of  infection - will monitor   --pain management with prn tylenol, tramadol, and oxycodone   -2/19 tylenol was scheduled to help with pain and minimize narcotic use. Also tramadol was added to again minimize oxycodone intake due to side effects like urinary retention.    -2/22 requiring tramadol and oxycodone x1-2/day; will monitor for one more day and dc oxycodone.    -2/24 dc'd oxy.   -3/7changed tylenol to prn given slightly elevated ALK on 3/6.  Recheck labs on 3/9.  --continue IV rocephin for total of 6 weeks - end date 3/24. 3/3 completed PLC class.   --should f/u with ID and neurosurgery teams as outpatient      ** Check CBC, CRP, creatinine, and LFTs weekly while on therapy and have results faxed to the [facility] at [phone]. - faxed on 2/21, 2/28 and 3/8.     Hyponatremia: resolved since 2/12 and stable ~ 131. 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 Most recent labs 139, 141, 140.    Anemia: likely due to acute illness and blood loss; stable since his surgery. Will monitor clinically and repeat labs as needed. Transfused 1 unit blood 2/25.    Anxiety/depressed mood/insomnia: patient reports history of occasional nighttime anxiety associated with inability to sleep prior to this admission. In the past, he has taken Zolpidem prn for this. Now complains of three consecutive nights of anxiety. Was given 12.5mg seroquel 2/22, patient says he thinks it helped.    --started scheduled ramelteon and prn seroquel on 2/23. 3/2 changed ramelteon to remeron 15 qhs due to no benefits and also to help with mood and appetite. 3/7 added melatonin and dc'd seroqul per hospitalist recs.   ** increased remeron to 30 on 3/8.  --discussed with pt and ordered rehab psychology consult      # DM type II with neuropathy: on glipizide and victoza injections prior to admission. HgbA1C was 6.1% on 2/10. Currently on lantus 12 units daily and SSI. 2/17 lantus was increased to 14 for better control.  2/21 lantus increased to 20 daily and also was started on metformin 500 bid. 2/28 not requiring SSI. 3/1 stopped BG check at 2am.     ** has a chronic diabetic ulcer on the plantar/ lateral aspect of his right foot; stable since admission.      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 Continue ASA; plavix restarted on 2/20.     # HTN: on amlodipine, atenolol, lisinopril and prinzide (?) prior to admission. Currently on atenolol 25 daily; BP was variable with new arrhythmia - see below. 3/7 lisinopril dose was increased for better control.     # Orthostatic hypotension - resolved: as confirmed by PT on 2/17. Multifactorial due to anemia, severe deconditioning and possibly autonomic instability due SCI. Should have TEDs and abdominal binder before getting out of bed. Ensure adequate hydration. Orthostatic precautions. See below as well.     # Tachycardia with irregular rhythm/A flutter: occurred during PT session on 2/21 while on tilt table; tachycardia is multifactorial as orthostatic hypotension. Spoke with Dr. Lange hospitalist, appreciated assistance. Cortisol wnl 2/22. Follow orthostatic precautions. Asymptomatic at this point. 2/22 repeat EKG with atrial flutter and variable block. Cardiology team was consulted; appreciate assistance. Recommended to continue current regimen with ASA and plavix and f/u as outpatient to review the plan for anticoagulation. 2/24 repeat TTE showed AFib and reduced EF; atenolol was changed to metoprolol for better rate control. 1 unit blood transfused 2/25.  --now in NSR  --continue metoprolol 50 bid, ASA and plavix   --f/u as outpatient to review the plan for anticoagulation    # HLD: on lipitor 40 daily. LDL was 48 in 5/2014 when last checked.      # CAD s/p CABG in 1995 with HFrEF (EF 40-45%):   # Acute systolic CHF:  no active issues upon admission. Had issues with orthostatic hypotension and A flutter as  "above. 2/24 repeat TTE showed worsening of his cardiac function. Per cardiology note, \"suspect EF is worse b/c tachycardia mediated from aflutter. Other possibility is stress induced. Less likely ischemic\". Was started on lisinopril 2.5mg daily, which was increased to 5mg daily on 2/28.  He also developed b/l pleural effusion following a blood transfusion on Sat 2/25; presented with acute onset SOB. Was started on lasix with some clinical improvement since then.   --continue ASA, lipitor and atenolol  --continue lisinopril 5mg daily; will titrate gradually to 10mg daily if BP remains > 120/80 per recs   --continue lasix 20mg daily     # H/o CLL in remission: last chemo in 2015; is followed by oncology team - no active issues.        FEN: regular, mod consistent CHO.     Bowel: ? Neurogenic bowel - on prn bowel meds upon admission with last BM on 2/14. Will monitor and adjust meds/bowel regimen as needed. 2/17 scheduled bowel meds. 2/20 had loose BMs over the weekend so meds were changed to prn; started on metformin which can be contributing as well. 2/28 regular daily BMs on current regimen.    Bladder: ? Neurogenic bladder - h/o BPH on flomax prior to admission; was not resumed after admission to the hospital likely due to low BP but restarted upon admission to ARU. PVRs elevated and was cathed once on 2/18. Plan is to minimize narcotic use, void in standing/sitting position as much as possible, and continue to monitor PVRs. 2/28 low PVRs.     DVT Prophylaxis: mechanical; per neurosurgery team notes Ok to start chemical prophylaxis on 2/16 - will clarify. Discussed and started lovenox 40 daily on 2/17. 2/21 dc'd lovenox per hospitalist team recs as he was started on plavix.     GI Prophylaxis: none, oral intake.     Code: full    Disposition: home vs TCU pending progress and family support. Discussed in team rounds. Care conf 3/1.    ELOS:  3-4 weeks. Care conference on 3/1. Discussed in team rounds on 3/8 - see " notes.     Follow up Appointments on Discharge: ID, neurosurgery, PCP and PM&R. + cardiology    Follow-up with neurosurgery, Dr. Marcelo Trent MD in 2 weeks for wound check.       Follow-up with Infectious Disease, Dr. Gillian Vila, on 3/23/17. If you have not heard back regarding your appointment time please call [phone] and ask to page the Infectious Disease fellow regarding your pre-planned appointment with Dr. Vila on 3/23/17 to assess antibiotic regimen and progress    Hyun Koehler MD  Physical Medicine & Rehabilitation      I spent a total of 25 minutes face-to-face or managing the care of Tad Dumont Nigel. Over 50% of my time on the unit was spent counseling the patient and coordinating care. See note for details.

## 2017-03-08 NOTE — PLAN OF CARE
Problem: Goal/Outcome  Goal: Goal Outcome Summary  FOCUS/GOAL  Bowel management, Medication management and Skin integrity     ASSESSMENT, INTERVENTIONS AND CONTINUING PLAN FOR GOAL:  Patient is alert/oriented x4, is able to use call light appropriately.  Patient had shower with NA at start of shift and tolerated well, was able to transfer back into bed with Ao2 pivot using the gait belt.  Patient denies any pain on this shift, open area on coccyx was cleaned and new Mepilex applied, per WOC assessment is friction related, patient denies any pain to that area.  Continuing on antibiotic therapy, PICC line hep locked following infusion and both lumens are patent.  Patient started on Melatonin scheduled tonight instead of Seroquel.  No additional care concerns at this time, continue POC.

## 2017-03-08 NOTE — PROGRESS NOTES
"  [facility]   Acute Rehabilitation Unit  Daily progress note    interval history  Tad Manning was seen and examined at bedside. Didn't sleep at all last night; took a nap yesterday afternoon which was probably the cause per his report. Denies any pain or discomfort. Discussed the plan for the management of insomnia with pt and Dr. Lund - will stop seroquel and start melatonin.     Making progress with therapies; \"progressing w/ bed mobility and able to get LE's in /out, ongoing cues for sequencing and initiation.\".        medications    melatonin  3 mg Oral At Bedtime     lisinopril  10 mg Oral Daily     mirtazapine  15 mg Oral At Bedtime     insulin glargine  18 Units Subcutaneous QAM AC     ranitidine  150 mg Oral BID     furosemide  20 mg Oral Daily     metoprolol  50 mg Oral BID     metFORMIN  500 mg Oral BID w/meals     clopidogrel  75 mg Oral Daily     acetaminophen  1,000 mg Oral TID     ascorbic acid (VITAMIN C) tablet 500 mg  500 mg Oral Daily     aspirin  325 mg Oral Daily     atorvastatin  40 mg Oral Daily     cefTRIAXone  2 g Intravenous Q12H     cholecalciferol  1,000 Units Oral Daily     tamsulosin  0.8 mg Oral At Bedtime     insulin aspart  1-7 Units Subcutaneous TID AC     insulin aspart  1-5 Units Subcutaneous At Bedtime     heparin lock flush  5-10 mL Intracatheter Q24H     diphenhydrAMINE **OR** diphenhydrAMINE, tramadol, senna-docusate, polyethylene glycol, bisacodyl, - MEDICATION INSTRUCTIONS -, lidocaine, glucose **OR** dextrose **OR** glucagon, naloxone, sodium chloride (PF), heparin lock flush       physical exam  /72 (BP Location: Left arm)  Pulse 91  Temp 95.5  F (35.3  C) (Oral)  Resp 16  Ht 1.829 m (6')  Wt 90.9 kg (200 lb 6.4 oz)  SpO2 95%  BMI 27.18 kg/m2     Gen: NAD, resting in bed   HEENT: has glasses   Heart: RRR  Pulm: clear breath sounds b/l  Abd: soft and non-tender    Neuro/MSK: paraparesis with 3/5 proximally and 4/5 " distally, diffuse muscle atrophy in bilateral lower extremities      Skin: posterior spinal incision healing well with less erythema     labs  BG in good range       assessment and plan  Tad Manning is a 76 year old right hand dominant male with 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 Medical co-morbidities include post-op pain, anemia, hyponatremia, and h/o bacteremia on IV antibiotic. PMH significant for DM with peripheral neuropathy, HTN, HLD, CAD s/p CABG, systolic and diastolic heart failure, and CLL in remission.      Functional deficits are weakness in bilateral lower extremities, sensory loss in feet (due to diabetic neuropathy but worse likely due to SCI), neurogenic bladder and bowel, and acute on chronic deconditioning with poor endurance.         Admission to acute inpatient rehab 2/15.     Rehabilitation: please see interval history for updates. Ordered SLP on 2/16 given his low score on SLUMS 23/30. SLP d/c on 3/2, OT increase to  min/day.      Medical Conditions; appreciate hospitalist team assistance in medical management.      # Incomplete SCI:  # Thoracic osteomyelitis/discitis with epidural abscess:  # S/p T9-10 laminectomy, T7-12 posterior arthrodesis and TF right sided T9-10 fusion.   # Strep mitis bacteremia:      --TLSO for comfort  --wound care per recs; sutures removed at 2 weeks higinio on 2/28.  --PICC line care on the right arm   -nurse reported green discharge 2/22, no clinical s/s of infection - will monitor   --pain management with prn tylenol, tramadol, and oxycodone   -2/19 tylenol was scheduled to help with pain and minimize narcotic use. Also tramadol was added to again minimize oxycodone intake due to side effects like urinary retention.    -2/22 requiring tramadol and oxycodone x1-2/day; will monitor for one more day and dc oxycodone.    -2/24 dc'd oxy.   -3/7changed  tylenol to prn given slightly elevated ALK on 3/6.    --continue IV rocephin for total of 6 weeks - end date 3/24. 3/3 completed PLC class.   --should f/u with ID and neurosurgery teams as outpatient      ** Check CBC, CRP, creatinine, and LFTs weekly while on therapy and have results faxed to the [facility] at [phone]. - faxed on 2/21 and 2/28.     Hyponatremia: resolved since 2/12 and stable ~ 131. 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 Most recent labs 139, 141, 140.    Anemia: likely due to acute illness and blood loss; stable since his surgery. Will monitor clinically and repeat labs as needed. Transfused 1 unit blood 2/25.    Anxiety/depressed mood/insomnia: patient reports history of occasional nighttime anxiety associated with inability to sleep prior to this admission. In the past, he has taken Zolpidem prn for this. Now complains of three consecutive nights of anxiety. Was given 12.5mg seroquel 2/22, patient says he thinks it helped.    --started scheduled ramelteon and prn seroquel on 2/23. 3/2 changed ramelteon to remeron 15 qhs due to no benefits and also to help with mood and appetite. 3/7 added melatonin and dc'd seroqul per hospitalist recs.     --discussed with pt and ordered rehab psychology consult      # DM type II with neuropathy: on glipizide and victoza injections prior to admission. HgbA1C was 6.1% on 2/10. Currently on lantus 12 units daily and SSI. 2/17 lantus was increased to 14 for better control. 2/21 lantus increased to 20 daily and also was started on metformin 500 bid. 2/28 not requiring SSI. 3/1 stopped BG check at 2am.      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 Continue ASA; plavix restarted on 2/20.     # HTN: on amlodipine, atenolol, lisinopril and prinzide (?) prior to admission. Currently on atenolol 25 daily; BP was variable with new arrhythmia - see below. 3/7  "lisinopril dose was increased for better control.     # Orthostatic hypotension - resolved: as confirmed by PT on 2/17. Multifactorial due to anemia, severe deconditioning and possibly autonomic instability due SCI. Should have TEDs and abdominal binder before getting out of bed. Ensure adequate hydration. Orthostatic precautions. See below as well.     # Tachycardia with irregular rhythm/A flutter: occurred during PT session on 2/21 while on tilt table; tachycardia is multifactorial as orthostatic hypotension. Spoke with Dr. Lange hospitalist, appreciated assistance. Cortisol wnl 2/22. Follow orthostatic precautions. Asymptomatic at this point. 2/22 repeat EKG with atrial flutter and variable block. Cardiology team was consulted; appreciate assistance. Recommended to continue current regimen with ASA and plavix and f/u as outpatient to review the plan for anticoagulation. 2/24 repeat TTE showed AFib and reduced EF; atenolol was changed to metoprolol for better rate control. 1 unit blood transfused 2/25.  --now in NSR  --continue metoprolol 50 bid, ASA and plavix   --f/u as outpatient to review the plan for anticoagulation    # HLD: on lipitor 40 daily. LDL was 48 in 5/2014 when last checked.      # CAD s/p CABG in 1995 with HFrEF (EF 40-45%):   # Acute systolic CHF:  no active issues upon admission. Had issues with orthostatic hypotension and A flutter as above. 2/24 repeat TTE showed worsening of his cardiac function. Per cardiology note, \"suspect EF is worse b/c tachycardia mediated from aflutter. Other possibility is stress induced. Less likely ischemic\". Was started on lisinopril 2.5mg daily, which was increased to 5mg daily on 2/28.  He also developed b/l pleural effusion following a blood transfusion on Sat 2/25; presented with acute onset SOB. Was started on lasix with some clinical improvement since then.   --continue ASA, lipitor and atenolol  --continue lisinopril 5mg daily; will titrate gradually to 10mg " daily if BP remains > 120/80 per recs   --continue lasix 20mg daily     # H/o CLL in remission: last chemo in 2015; is followed by oncology team - no active issues.        FEN: regular, mod consistent CHO.     Bowel: ? Neurogenic bowel - on prn bowel meds upon admission with last BM on 2/14. Will monitor and adjust meds/bowel regimen as needed. 2/17 scheduled bowel meds. 2/20 had loose BMs over the weekend so meds were changed to prn; started on metformin which can be contributing as well. 2/28 regular daily BMs on current regimen.    Bladder: ? Neurogenic bladder - h/o BPH on flomax prior to admission; was not resumed after admission to the hospital likely due to low BP but restarted upon admission to ARU. PVRs elevated and was cathed once on 2/18. Plan is to minimize narcotic use, void in standing/sitting position as much as possible, and continue to monitor PVRs. 2/28 low PVRs.     DVT Prophylaxis: mechanical; per neurosurgery team notes Ok to start chemical prophylaxis on 2/16 - will clarify. Discussed and started lovenox 40 daily on 2/17. 2/21 dc'd lovenox per hospitalist team recs as he was started on plavix.     GI Prophylaxis: none, oral intake.     Code: full    Disposition: home vs TCU pending progress and family support. Discussed in team rounds. Care conf 3/1.    ELOS:  3-4 weeks. ~2 weeks from care conference on 3/1.    Follow up Appointments on Discharge: ID, neurosurgery, PCP and PM&R. + cardiology    Follow-up with neurosurgery, Dr. Marcelo Trent MD in 2 weeks for wound check.       Follow-up with Infectious Disease, Dr. Gillian Vila, on 3/23/17. If you have not heard back regarding your appointment time please call [phone] and ask to page the Infectious Disease fellow regarding your pre-planned appointment with Dr. Vila on 3/23/17 to assess antibiotic regimen and progress    Hyun Koehler MD  Physical Medicine & Rehabilitation      I spent a total of 25 minutes face-to-face or  managing the care of Tad Manning. Over 50% of my time on the unit was spent counseling the patient and coordinating care. See note for details.

## 2017-03-08 NOTE — PLAN OF CARE
Problem: Goal/Outcome  Goal: Goal Outcome Summary  Outcome: No Change  FOCUS/GOAL  Bowel management, Bladder management, Medical management and Skin integrity     ASSESSMENT, INTERVENTIONS AND CONTINUING PLAN FOR GOAL  Pt's vitals stable this shift. BGs 128 and 115. No correction novolog needed. Tolerated IV antibiotic thru PICC this morning. Pt needed total assist of 2 staff to boost up in bed but able to turn and reposition with 1 staff. Assist of 1 with sliding board transfers. Pt using the urinal with set up for voiding. He had a large bm using the bedpan in bed with assist of staff to position. Mepilex on right buttocks replaced because it got soiled. Skin cares per orders. Noted scabbed areas on right lateral pinky toe and below that. Seen by RONNY. She said it is a neuropathic foot ulcer. Continue to monitor.

## 2017-03-08 NOTE — PROGRESS NOTES
"CLINICAL NUTRITION SERVICES - REASSESSMENT NOTE     Nutrition Prescription    RECOMMENDATIONS FOR MDs/PROVIDERS TO ORDER:  -None at this time.    Malnutrition Status:    Severe malnutrition in the context of acute illness    Recommendations already ordered by Registered Dietitian (RD):  -Continue with Ensure Plus bid with meals (vanilla and strawberry flavor) plus a cup of ice per pt preference.    -Try Ensure Clear apple with breakfast daily.    Future/Additional Recommendations:  Continue consistent carb diet + supplements. Consider diet liberalization if intake remains poor and blood sugars are in good control. Encourage intake of meals/supplements/fluids to support adequate nutrition and healing.     EVALUATION OF THE PROGRESS TOWARD GOALS   Diet: Moderate Consistent Carbohydrate Diet (2368-2096 kcals per day)  Ensure Plus with meals (vanilla at lunch and strawberry with supper) plus a cup of ice  Room Service with Assist    Intake: PO intakes have been 100% of recorded meals per nursing flow sheets.  Per review of selections in [facility], pt orders smaller meals tid of inadequate calories and protein to meet assessed nutritional needs.    Per pt he is drinking the Ensure Plus bid, but is getting tired of it.  He declines to receive another Ensure Plus, but agreeable to trying Ensure Clear apple with breakfast daily.      NEW FINDINGS   WOCN (3/7)   \" Moisture associated dermatitis to perineum   Friction wound to right fleshy buttock\"    -Weight (3/8)  201 lbs 3.2 oz  ARU Admission Weight (2/15)  199 lbs 1.6 oz  [place] Admission Weight-no weights available  [facility] Admission Weight (2/7/19)  179 lbs  Previous [facility] Admission Weight (1/22)  189 lbs 13.1 oz    10/27/16 99.8 kg (220 lb)     ? Accuracy of weights since hospitalization.  Weights on ARU have been done on bedscale.  Pt did report a 40 lbs  loss over the past few months which is consistent with hospital admission weight of " 2/7.    BG (3/7)     MALNUTRITION  % Intake: < 75% for > /= 1 month (severe)   % Weight Loss: > 7.5% in 3 months (severe)  Subcutaneous Fat Loss: None observed  Muscle Loss: Thoracic region (clavicle, acromium bone, deltoid, trapezius, pectoral): Mild/moderate and Upper arm (bicep, tricep): Mild/moderate  Fluid Accumulation/Edema: None noted  Malnutrition Diagnosis: Severe malnutrition in the context of acute illness.    Previous Goals   1. Patient to consume % of nutritionally adequate meal trays TID, or the equivalent with supplements/snacks.  Evaluation: Not met.    2. Weight maintenance  Evaluation: appears met (although ? Accuracy of weights).    Previous Nutrition Diagnosis  Inadequate oral intake related to decreased appetite as evidenced by variable intake of 25%-100% of recorded meals over past week, not consistently taking supplements and possible continued weight loss.    Evaluation:  Improving?/updated below    CURRENT NUTRITION DIAGNOSIS  Inadequate oral intake related to decreased appetite as evidenced by ordering small meals which together with bid supplements meet <75% of assessed nutrition needs.    INTERVENTIONS  Implementation  Nutrition Education:  Encouraged pt to order more at tid meals including protein and continue to drink supplements. i Discussed supplement options.      Ordered Ensure Clear apple with breakfast for a trial.  Will continue with Ensure Plus at lunch and supper daily.    Goals  1. Patient to consume % of nutritionally adequate meal trays TID, or the equivalent with supplements/snacks.  2. Weight maintenance.    Monitoring/Evaluation  Progress toward goals will be monitored and evaluated per protocol.    Carolina Mishra RD, LD

## 2017-03-08 NOTE — PROGRESS NOTES
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     Currently has limited mobility due to pain, weakness, diffuse muscle atrophy and deconditioning.     Current transfer status: squat pivot transfers with A    Distance patient currently is not able to walk due to the above deficits.     Therapy team has ruled out the following less costly options: Cane and walker due to significant weakness and fall risk.     Patient will use wheelchair to complete Mobility Related ADL's in the home including toileting, dressing, self cares, and meal prep.    Without a wheelchair patient will be unable to safely move about their home.  This equipment will allow them to be out of bed, participate in home activities with their family, and will be part of a fall prevention plan for safe mobility.   Patient's home will accommodate use of wheelchair.  Patient has a family member willing and able to assist as necessary with wheelchair.     Length of need: lifetime     Current height:1.829 m  Current weight:91.3 kg      Hyun Koehler MD  Physical Medicine & Rehabilitation

## 2017-03-09 ENCOUNTER — APPOINTMENT (OUTPATIENT)
Dept: GENERAL RADIOLOGY | Facility: CLINIC | Age: 77
DRG: 949 | End: 2017-03-09
Attending: INTERNAL MEDICINE
Payer: MEDICARE

## 2017-03-09 LAB
ANION GAP SERPL CALCULATED.3IONS-SCNC: 8 MMOL/L (ref 3–14)
BUN SERPL-MCNC: 14 MG/DL (ref 7–30)
CALCIUM SERPL-MCNC: 8.3 MG/DL (ref 8.5–10.1)
CHLORIDE SERPL-SCNC: 109 MMOL/L (ref 94–109)
CO2 SERPL-SCNC: 25 MMOL/L (ref 20–32)
CREAT SERPL-MCNC: 0.61 MG/DL (ref 0.66–1.25)
GFR SERPL CREATININE-BSD FRML MDRD: ABNORMAL ML/MIN/1.7M2
GLUCOSE BLDC GLUCOMTR-MCNC: 145 MG/DL (ref 70–99)
GLUCOSE BLDC GLUCOMTR-MCNC: 153 MG/DL (ref 70–99)
GLUCOSE BLDC GLUCOMTR-MCNC: 172 MG/DL (ref 70–99)
GLUCOSE BLDC GLUCOMTR-MCNC: 238 MG/DL (ref 70–99)
GLUCOSE SERPL-MCNC: 232 MG/DL (ref 70–99)
NT-PROBNP SERPL-MCNC: ABNORMAL PG/ML (ref 0–900)
POTASSIUM SERPL-SCNC: 4.4 MMOL/L (ref 3.4–5.3)
SODIUM SERPL-SCNC: 142 MMOL/L (ref 133–144)

## 2017-03-09 PROCEDURE — A9270 NON-COVERED ITEM OR SERVICE: HCPCS | Mod: GY | Performed by: INTERNAL MEDICINE

## 2017-03-09 PROCEDURE — 25000132 ZZH RX MED GY IP 250 OP 250 PS 637: Mod: GY | Performed by: INTERNAL MEDICINE

## 2017-03-09 PROCEDURE — 40000133 ZZH STATISTIC OT WARD VISIT: Performed by: OCCUPATIONAL THERAPIST

## 2017-03-09 PROCEDURE — 25000128 H RX IP 250 OP 636

## 2017-03-09 PROCEDURE — 71010 XR CHEST PORT 1 VW: CPT

## 2017-03-09 PROCEDURE — 97535 SELF CARE MNGMENT TRAINING: CPT | Mod: GO

## 2017-03-09 PROCEDURE — A9270 NON-COVERED ITEM OR SERVICE: HCPCS | Mod: GY | Performed by: PHYSICAL MEDICINE & REHABILITATION

## 2017-03-09 PROCEDURE — 25000132 ZZH RX MED GY IP 250 OP 250 PS 637: Mod: GY | Performed by: PHYSICAL MEDICINE & REHABILITATION

## 2017-03-09 PROCEDURE — 99232 SBSQ HOSP IP/OBS MODERATE 35: CPT | Performed by: INTERNAL MEDICINE

## 2017-03-09 PROCEDURE — 83880 ASSAY OF NATRIURETIC PEPTIDE: CPT | Performed by: INTERNAL MEDICINE

## 2017-03-09 PROCEDURE — 97110 THERAPEUTIC EXERCISES: CPT | Mod: GP

## 2017-03-09 PROCEDURE — 12800006 ZZH R&B REHAB

## 2017-03-09 PROCEDURE — 25000128 H RX IP 250 OP 636: Performed by: INTERNAL MEDICINE

## 2017-03-09 PROCEDURE — 25000125 ZZHC RX 250: Performed by: PHYSICAL MEDICINE & REHABILITATION

## 2017-03-09 PROCEDURE — 40000133 ZZH STATISTIC OT WARD VISIT

## 2017-03-09 PROCEDURE — 40000193 ZZH STATISTIC PT WARD VISIT

## 2017-03-09 PROCEDURE — 80048 BASIC METABOLIC PNL TOTAL CA: CPT | Performed by: INTERNAL MEDICINE

## 2017-03-09 PROCEDURE — 00000146 ZZHCL STATISTIC GLUCOSE BY METER IP

## 2017-03-09 PROCEDURE — 97535 SELF CARE MNGMENT TRAINING: CPT | Mod: GO | Performed by: OCCUPATIONAL THERAPIST

## 2017-03-09 RX ORDER — SODIUM CHLORIDE 9 MG/ML
INJECTION, SOLUTION INTRAVENOUS
Status: COMPLETED
Start: 2017-03-09 | End: 2017-03-09

## 2017-03-09 RX ORDER — FUROSEMIDE 40 MG
40 TABLET ORAL DAILY
Status: DISCONTINUED | OUTPATIENT
Start: 2017-03-10 | End: 2017-03-19 | Stop reason: HOSPADM

## 2017-03-09 RX ORDER — FUROSEMIDE 20 MG
20 TABLET ORAL ONCE
Status: COMPLETED | OUTPATIENT
Start: 2017-03-09 | End: 2017-03-09

## 2017-03-09 RX ADMIN — OXYCODONE HYDROCHLORIDE AND ACETAMINOPHEN 500 MG: 500 TABLET ORAL at 08:10

## 2017-03-09 RX ADMIN — CLOPIDOGREL BISULFATE 75 MG: 75 TABLET, FILM COATED ORAL at 08:10

## 2017-03-09 RX ADMIN — LISINOPRIL 10 MG: 10 TABLET ORAL at 08:10

## 2017-03-09 RX ADMIN — CEFTRIAXONE 2 G: 2 INJECTION, POWDER, FOR SOLUTION INTRAMUSCULAR; INTRAVENOUS at 21:32

## 2017-03-09 RX ADMIN — CEFTRIAXONE 2 G: 2 INJECTION, POWDER, FOR SOLUTION INTRAMUSCULAR; INTRAVENOUS at 08:10

## 2017-03-09 RX ADMIN — RANITIDINE HYDROCHLORIDE 150 MG: 150 TABLET, FILM COATED ORAL at 20:14

## 2017-03-09 RX ADMIN — FUROSEMIDE 20 MG: 20 TABLET ORAL at 08:10

## 2017-03-09 RX ADMIN — METOPROLOL TARTRATE 50 MG: 50 TABLET, FILM COATED ORAL at 08:10

## 2017-03-09 RX ADMIN — METOPROLOL TARTRATE 50 MG: 50 TABLET, FILM COATED ORAL at 20:14

## 2017-03-09 RX ADMIN — METFORMIN HYDROCHLORIDE 500 MG: 500 TABLET ORAL at 08:10

## 2017-03-09 RX ADMIN — TAMSULOSIN HYDROCHLORIDE 0.8 MG: 0.4 CAPSULE ORAL at 20:14

## 2017-03-09 RX ADMIN — METFORMIN HYDROCHLORIDE 500 MG: 500 TABLET ORAL at 18:43

## 2017-03-09 RX ADMIN — SODIUM CHLORIDE 100 ML: 900 INJECTION INTRAVENOUS at 08:12

## 2017-03-09 RX ADMIN — MIRTAZAPINE 15 MG: 7.5 TABLET, FILM COATED ORAL at 20:19

## 2017-03-09 RX ADMIN — INSULIN ASPART 1 UNITS: 100 INJECTION, SOLUTION INTRAVENOUS; SUBCUTANEOUS at 18:43

## 2017-03-09 RX ADMIN — INSULIN GLARGINE 18 UNITS: 100 INJECTION, SOLUTION SUBCUTANEOUS at 06:57

## 2017-03-09 RX ADMIN — RANITIDINE HYDROCHLORIDE 150 MG: 150 TABLET, FILM COATED ORAL at 08:10

## 2017-03-09 RX ADMIN — ATORVASTATIN CALCIUM 40 MG: 40 TABLET, FILM COATED ORAL at 08:10

## 2017-03-09 RX ADMIN — ASPIRIN 325 MG ORAL TABLET 325 MG: 325 PILL ORAL at 08:10

## 2017-03-09 RX ADMIN — SODIUM CHLORIDE, PRESERVATIVE FREE 5 ML: 5 INJECTION INTRAVENOUS at 11:50

## 2017-03-09 RX ADMIN — VITAMIN D, TAB 1000IU (100/BT) 1000 UNITS: 25 TAB at 08:10

## 2017-03-09 RX ADMIN — INSULIN ASPART 2 UNITS: 100 INJECTION, SOLUTION INTRAVENOUS; SUBCUTANEOUS at 12:50

## 2017-03-09 RX ADMIN — INSULIN ASPART 1 UNITS: 100 INJECTION, SOLUTION INTRAVENOUS; SUBCUTANEOUS at 08:11

## 2017-03-09 RX ADMIN — FUROSEMIDE 20 MG: 20 TABLET ORAL at 11:22

## 2017-03-09 RX ADMIN — SODIUM CHLORIDE, PRESERVATIVE FREE 5 ML: 5 INJECTION INTRAVENOUS at 21:32

## 2017-03-09 NOTE — PLAN OF CARE
Problem: Goal/Outcome  Goal: Goal Outcome Summary  Outcome: Improving  FOCUS/GOAL  Medical management     ASSESSMENT, INTERVENTIONS AND CONTINUING PLAN FOR GOAL:  Patient was upset at the beginning of the shift because he asked for a sleeping pill, none was available because he took Melatonin and Remeron before bed time, one hour apart. He soon calmed down and appeared to be sleeping each time writer checked him. He calls when he needs help.  He used urinal independently, staff emptied it. Encouraged him to reposition in left lying position which he can do independently to relief pressure from coccyx where he has an open area. No c/o pain, He reported that he has been turning himself toward left side.

## 2017-03-09 NOTE — PLAN OF CARE
Problem: Goal/Outcome  Goal: Goal Outcome Summary  Outcome: No Change  FOCUS/GOAL  Wound care management, Mobility and Skin integrity     ASSESSMENT, INTERVENTIONS AND CONTINUING PLAN FOR GOAL:  Pt needs assist of one staff with bed mobility, but is non-complaint with reposition. Dressing changed to coccyx, no drainage noted, barrier carrier applied to buttocks. Picc line patent, no signs of infection noted to insertion site, dressing changed per orders, IV antibiotic infused without problems. Pt denies pain or discomfort. Pt is resting comfortable at present time.

## 2017-03-09 NOTE — PLAN OF CARE
Problem: Goal/Outcome  Goal: Goal Outcome Summary  Patient is alert and oriented x 4. Denies any pain. Complained of light headed ness with first session OT, when pt sat at edge of bed. O2 sats had dropped into the lower 80 per therapist. Writer checked blood sugar at that time and applied oxygen via nasal cannula at two liters. Patient did note that he was feeling a little short of breath at that time. Sats then went up in the mid 90. O2 sats  checked a few more times this shift and continued to be good.  At one point patient noted that he felt better and wanted to take the oxygen off. Took the 02 off, but then after a couple of minutes, he noted that the S0b was creeping back, and so O2 reapplied, and at this time pt notes that he is comfortable with his breathing with his O2 on. Chest xray done and per the result, patient has some mild pulmonary edema. Pt also received 20 mg lasix once. Back incision is intact, open to air. Continues with 2+ bilateral feet edema, sophy socks are on. Continent of bladder utilizing a urinal independently. Staff assisting to empty urinal. Picc right upper extremity is patent and intact. Will continue to monitor and care per poc.

## 2017-03-09 NOTE — PLAN OF CARE
"Problem: Goal/Outcome  Goal: Goal Outcome Summary  -15 mins OT due to pt report of \"light headedness, SOB and sweating.\" Pt /97 while EOB and 159/86 supine, O2 83-85% on RA and -125. RN provided 2L of O2.       "

## 2017-03-09 NOTE — PROGRESS NOTES
Feels short of breath   Denies fever   Had saturations down to 80s as per patient   Currently on 2 lit oxygen - saturating 95%     Blood pressure 131/75, pulse 81, temperature 95.9  F (35.5  C), temperature source Oral, resp. rate 20, height 1.829 m (6'), weight 89.8 kg (198 lb), SpO2 97 %.    HEENT: No icterus, no pallor  Cardiovascular: S1, S2 normal.  Respiratory:  diminished at lung bases on both sides   GI/Abdomen: Soft, NT, BS+  Neurology: Alert, awake, and oriented. No tremors.   Extremities: No pretibial edema.     Wt Readings from Last 5 Encounters:   03/09/17 89.8 kg (198 lb)   02/07/17 81.2 kg (179 lb)   01/24/17 85.7 kg (188 lb 15 oz)   12/29/16 95.3 kg (210 lb)   12/22/16 95.3 kg (210 lb)     Assessment and plan:   Tad Manning is a 76-year-old  gentleman transferred from the [facility] after T7-T12 posterior spinal segmental instrumentation and T9-T10 interbody fusion and posterior arthrodesis T70-T12 for a spinal abscess with osteomyelitis and diskitis.     # Acute systolic CHF: Started on Furosemide 20 mg daily 2/26  Echo on 02/24 showed decreased LVEF and severe global hypokinesis.Lisinopril was started and then titrated up to 5 mg daily 2/28/2017  - Increase Lisinopril to 10 mg daily on 3/6/17  - on  Furosemide 20 mg daily  And  Metoprolol 50 mg BID   - in view of more short ness of breath - got a chest x-ray - showed pulmonary edema - will give extra dose lasix 20 mg and increase his daily dose to 40 mg     - FOllow up with [facility] cardiology in one month after discharge with repeat Echo to assess EF and rate control.       # Orthostatic Dizziness and Tachycardia: Worse on tilt table : appears resolved   Reports being asymptomatic for one week   # Tachy w PAC's and fusion comlex on EKG 2/22: Atrial flutter with variable block 2/23 Cardiology was consulted. Atenolol was changed to Metoprolol bid 2/24 with improvement. 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   The patient should have a CBC, CRP, creatinine and LFTs done weekly.   CRP 19.9 on 03/06 and improving  Followup with Infectious Disease, Dr. Gillian Max on 03/23/2017.     # Diabetes mellitus: Continue the Glargine at night along with Correctional Insulin.   Glargine was increased to 20 units 2/19, and then decreased to 18 Units on 03/01 Metformin (home medication) was started on 02/20  Blood sugar controlled.   - Continue Basal, Correctional insulin   - Blood sugar before meals, and bed time.        Recent Labs  Lab 03/09/17  0717 03/08/17  2138 03/08/17  1707 03/08/17  1203 03/08/17  0734 03/07/17  2100  03/06/17  0629  03/03/17  0734   GLC  --   --   --   --   --   --   --  115*  --  120*   * 137* 108* 115* 128* 122*  < >  --   < > 115*   < > = values in this interval not displayed.  # Abnormal liver chemistry: Elevated ALP with normal bilirubin, and transaminases. 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 Follow up with Oncology.   # CAD: Continue Aspirin 325 mg daily, Atorvastatin 40 mg daily, Metoprolol.   # Spinal cord compression with lower extremity weakness: plan per Primary team   # insomnia : using PRN Seroquel, will change to Melatonin 3 mg at bed time.      Juan Galindo MD  Hospitalist

## 2017-03-10 LAB
ALBUMIN SERPL-MCNC: 2.5 G/DL (ref 3.4–5)
ALP SERPL-CCNC: 230 U/L (ref 40–150)
ALT SERPL W P-5'-P-CCNC: 35 U/L (ref 0–70)
ANION GAP SERPL CALCULATED.3IONS-SCNC: 9 MMOL/L (ref 3–14)
AST SERPL W P-5'-P-CCNC: 13 U/L (ref 0–45)
BILIRUB DIRECT SERPL-MCNC: 0.2 MG/DL (ref 0–0.2)
BILIRUB SERPL-MCNC: 0.5 MG/DL (ref 0.2–1.3)
BUN SERPL-MCNC: 14 MG/DL (ref 7–30)
CALCIUM SERPL-MCNC: 8.5 MG/DL (ref 8.5–10.1)
CHLORIDE SERPL-SCNC: 109 MMOL/L (ref 94–109)
CO2 SERPL-SCNC: 25 MMOL/L (ref 20–32)
CREAT SERPL-MCNC: 0.61 MG/DL (ref 0.66–1.25)
ERYTHROCYTE [DISTWIDTH] IN BLOOD BY AUTOMATED COUNT: 17.2 % (ref 10–15)
GFR SERPL CREATININE-BSD FRML MDRD: ABNORMAL ML/MIN/1.7M2
GLUCOSE BLDC GLUCOMTR-MCNC: 149 MG/DL (ref 70–99)
GLUCOSE BLDC GLUCOMTR-MCNC: 149 MG/DL (ref 70–99)
GLUCOSE BLDC GLUCOMTR-MCNC: 209 MG/DL (ref 70–99)
GLUCOSE BLDC GLUCOMTR-MCNC: 97 MG/DL (ref 70–99)
GLUCOSE SERPL-MCNC: 167 MG/DL (ref 70–99)
HCT VFR BLD AUTO: 34.6 % (ref 40–53)
HGB BLD-MCNC: 10.2 G/DL (ref 13.3–17.7)
MCH RBC QN AUTO: 27 PG (ref 26.5–33)
MCHC RBC AUTO-ENTMCNC: 29.5 G/DL (ref 31.5–36.5)
MCV RBC AUTO: 92 FL (ref 78–100)
PLATELET # BLD AUTO: 205 10E9/L (ref 150–450)
POTASSIUM SERPL-SCNC: 4 MMOL/L (ref 3.4–5.3)
PROT SERPL-MCNC: 6.1 G/DL (ref 6.8–8.8)
RBC # BLD AUTO: 3.78 10E12/L (ref 4.4–5.9)
SODIUM SERPL-SCNC: 143 MMOL/L (ref 133–144)
WBC # BLD AUTO: 9.6 10E9/L (ref 4–11)

## 2017-03-10 PROCEDURE — 36415 COLL VENOUS BLD VENIPUNCTURE: CPT | Performed by: INTERNAL MEDICINE

## 2017-03-10 PROCEDURE — 80076 HEPATIC FUNCTION PANEL: CPT | Performed by: INTERNAL MEDICINE

## 2017-03-10 PROCEDURE — 25000132 ZZH RX MED GY IP 250 OP 250 PS 637: Mod: GY | Performed by: INTERNAL MEDICINE

## 2017-03-10 PROCEDURE — 85027 COMPLETE CBC AUTOMATED: CPT | Performed by: INTERNAL MEDICINE

## 2017-03-10 PROCEDURE — 25000128 H RX IP 250 OP 636: Performed by: INTERNAL MEDICINE

## 2017-03-10 PROCEDURE — A9270 NON-COVERED ITEM OR SERVICE: HCPCS | Mod: GY | Performed by: INTERNAL MEDICINE

## 2017-03-10 PROCEDURE — 40000193 ZZH STATISTIC PT WARD VISIT

## 2017-03-10 PROCEDURE — 97530 THERAPEUTIC ACTIVITIES: CPT | Mod: GP | Performed by: PHYSICAL THERAPIST

## 2017-03-10 PROCEDURE — 25000132 ZZH RX MED GY IP 250 OP 250 PS 637: Mod: GY | Performed by: PHYSICAL MEDICINE & REHABILITATION

## 2017-03-10 PROCEDURE — 00000146 ZZHCL STATISTIC GLUCOSE BY METER IP

## 2017-03-10 PROCEDURE — A9270 NON-COVERED ITEM OR SERVICE: HCPCS | Mod: GY | Performed by: PHYSICAL MEDICINE & REHABILITATION

## 2017-03-10 PROCEDURE — 40000193 ZZH STATISTIC PT WARD VISIT: Performed by: PHYSICAL THERAPIST

## 2017-03-10 PROCEDURE — 99232 SBSQ HOSP IP/OBS MODERATE 35: CPT | Performed by: INTERNAL MEDICINE

## 2017-03-10 PROCEDURE — 97110 THERAPEUTIC EXERCISES: CPT | Mod: GO

## 2017-03-10 PROCEDURE — 12800006 ZZH R&B REHAB

## 2017-03-10 PROCEDURE — 25000125 ZZHC RX 250: Performed by: PHYSICAL MEDICINE & REHABILITATION

## 2017-03-10 PROCEDURE — 40000133 ZZH STATISTIC OT WARD VISIT

## 2017-03-10 PROCEDURE — 97110 THERAPEUTIC EXERCISES: CPT | Mod: GP | Performed by: PHYSICAL THERAPIST

## 2017-03-10 PROCEDURE — 97530 THERAPEUTIC ACTIVITIES: CPT | Mod: GO

## 2017-03-10 PROCEDURE — 97150 GROUP THERAPEUTIC PROCEDURES: CPT | Mod: GP

## 2017-03-10 PROCEDURE — 80048 BASIC METABOLIC PNL TOTAL CA: CPT | Performed by: INTERNAL MEDICINE

## 2017-03-10 RX ORDER — FUROSEMIDE 20 MG
20 TABLET ORAL EVERY EVENING
Status: DISCONTINUED | OUTPATIENT
Start: 2017-03-10 | End: 2017-03-14

## 2017-03-10 RX ORDER — LANOLIN ALCOHOL/MO/W.PET/CERES
3 CREAM (GRAM) TOPICAL
Status: DISCONTINUED | OUTPATIENT
Start: 2017-03-10 | End: 2017-03-19 | Stop reason: HOSPADM

## 2017-03-10 RX ADMIN — RANITIDINE HYDROCHLORIDE 150 MG: 150 TABLET, FILM COATED ORAL at 19:36

## 2017-03-10 RX ADMIN — RANITIDINE HYDROCHLORIDE 150 MG: 150 TABLET, FILM COATED ORAL at 07:32

## 2017-03-10 RX ADMIN — MIRTAZAPINE 15 MG: 7.5 TABLET, FILM COATED ORAL at 19:36

## 2017-03-10 RX ADMIN — TAMSULOSIN HYDROCHLORIDE 0.8 MG: 0.4 CAPSULE ORAL at 19:37

## 2017-03-10 RX ADMIN — MELATONIN TAB 3 MG 3 MG: 3 TAB at 01:18

## 2017-03-10 RX ADMIN — CLOPIDOGREL BISULFATE 75 MG: 75 TABLET, FILM COATED ORAL at 07:32

## 2017-03-10 RX ADMIN — INSULIN GLARGINE 20 UNITS: 100 INJECTION, SOLUTION SUBCUTANEOUS at 07:33

## 2017-03-10 RX ADMIN — METFORMIN HYDROCHLORIDE 500 MG: 500 TABLET ORAL at 07:32

## 2017-03-10 RX ADMIN — METFORMIN HYDROCHLORIDE 500 MG: 500 TABLET ORAL at 17:56

## 2017-03-10 RX ADMIN — FUROSEMIDE 40 MG: 40 TABLET ORAL at 07:32

## 2017-03-10 RX ADMIN — VITAMIN D, TAB 1000IU (100/BT) 1000 UNITS: 25 TAB at 07:32

## 2017-03-10 RX ADMIN — INSULIN ASPART 1 UNITS: 100 INJECTION, SOLUTION INTRAVENOUS; SUBCUTANEOUS at 07:33

## 2017-03-10 RX ADMIN — METOPROLOL TARTRATE 50 MG: 50 TABLET, FILM COATED ORAL at 19:36

## 2017-03-10 RX ADMIN — ATORVASTATIN CALCIUM 40 MG: 40 TABLET, FILM COATED ORAL at 07:32

## 2017-03-10 RX ADMIN — CEFTRIAXONE 2 G: 2 INJECTION, POWDER, FOR SOLUTION INTRAMUSCULAR; INTRAVENOUS at 07:34

## 2017-03-10 RX ADMIN — INSULIN ASPART 2 UNITS: 100 INJECTION, SOLUTION INTRAVENOUS; SUBCUTANEOUS at 12:18

## 2017-03-10 RX ADMIN — SODIUM CHLORIDE, PRESERVATIVE FREE 5 ML: 5 INJECTION INTRAVENOUS at 19:32

## 2017-03-10 RX ADMIN — FUROSEMIDE 20 MG: 20 TABLET ORAL at 16:18

## 2017-03-10 RX ADMIN — LISINOPRIL 10 MG: 10 TABLET ORAL at 07:32

## 2017-03-10 RX ADMIN — METOPROLOL TARTRATE 50 MG: 50 TABLET, FILM COATED ORAL at 07:32

## 2017-03-10 RX ADMIN — ASPIRIN 325 MG ORAL TABLET 325 MG: 325 PILL ORAL at 07:32

## 2017-03-10 RX ADMIN — CEFTRIAXONE 2 G: 2 INJECTION, POWDER, FOR SOLUTION INTRAMUSCULAR; INTRAVENOUS at 19:29

## 2017-03-10 RX ADMIN — OXYCODONE HYDROCHLORIDE AND ACETAMINOPHEN 500 MG: 500 TABLET ORAL at 07:32

## 2017-03-10 NOTE — PLAN OF CARE
Problem: Goal/Outcome  Goal: Goal Outcome Summary  FOCUS/GOAL  Medical management and Energy conservation     ASSESSMENT, INTERVENTIONS AND CONTINUING PLAN FOR GOAL:  Patient is alert/oriented, able to communicate needs effectively on this shift.  Patient has denied any pain, was able to some of PT this afternoon but minimal as gets dyspneic with exertion, have kept O2 on this shift at 2Lper NC and patient's O2 sat has remained above 97%.  Patient was encouraged to use IS and was able to demonstrate using but still only has minimal effort at this time, continue to encourage use.  Patient had some elevated BG on day shift and also was noted no CBC done since 3/6, paged Hospitalist to [facility] and orders placed for CBC tomorrow am, also lantus dose was adjusted slightly.  No additional care concerns at this time, continue with POC.

## 2017-03-10 NOTE — PROGRESS NOTES
Still feels short of breath ,but little better than yesterday  No fever or chill  Denies cough   Denies dysuria     Blood pressure (!) 162/95, pulse 100, temperature 97.2  F (36.2  C), temperature source Oral, resp. rate 19, height 1.829 m (6'), weight 87.3 kg (192 lb 8 oz), SpO2 96 %.    HEENT: No icterus, no pallor  Cardiovascular: S1, S2 normal.  Respiratory:  diminished at lung bases on both sides with more crepitations on rt.side   GI/Abdomen: Soft, NT, BS+  Neurology: Alert, awake, and oriented. No tremors.   Extremities: No pretibial edema.     Wt Readings from Last 5 Encounters:   03/10/17 87.3 kg (192 lb 8 oz)   02/07/17 81.2 kg (179 lb)   01/24/17 85.7 kg (188 lb 15 oz)   12/29/16 95.3 kg (210 lb)   12/22/16 95.3 kg (210 lb)     Assessment and plan:   Tad Manning is a 76-year-old  gentleman transferred from the [facility] after T7-T12 posterior spinal segmental instrumentation and T9-T10 interbody fusion and posterior arthrodesis T70-T12 for a spinal abscess with osteomyelitis and diskitis.     # Acute systolic CHF: Started on Furosemide 20 mg daily 2/26  Echo on 02/24 showed decreased LVEF and severe global hypokinesis.Lisinopril was started and then titrated up to 5 mg daily 2/28/2017  - Increased Lisinopril to 10 mg daily on 3/6/17  - on  Furosemide 20 mg daily  And  Metoprolol 50 mg BID   - in view of more short ness of breath - got a chest x-ray - showed pulmonary edema - gave an extra dose lasix 20 mg and increased his daily dose to 40 mg  On 3/9  Also added additional 20 mg of lasix at 4 PM   Renal functions appears stable     - FOllow up with [facility] cardiology in one month after discharge with repeat Echo to assess EF and rate control.       # Orthostatic Dizziness and Tachycardia: Worse on tilt table : appears resolved   Reports being asymptomatic for one week   # Tachy w PAC's and fusion comlex on EKG 2/22: Atrial flutter with variable block  2/23 Cardiology was consulted. Atenolol was changed to Metoprolol bid 2/24 with improvement. 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   The patient should have a CBC, CRP, creatinine and LFTs done weekly.   CRP 19.9 on 03/06 and improving  Followup with Infectious Disease, Dr. Gillian Max on 03/23/2017.     # Diabetes mellitus: stable   Glargine was increased to 20 units 2/19, and then decreased to 18 Units on 03/01 Metformin (home medication) was started on 02/20  - Continue Basal, Correctional insulin        Recent Labs  Lab 03/10/17  0724 03/10/17  0712 03/09/17  2052 03/09/17  1716 03/09/17  1133 03/09/17  1130 03/09/17  0717 03/08/17  2138  03/06/17  0629   *  --   --   --   --  232*  --   --   --  115*   BGM  --  149* 145* 153* 238*  --  172* 137*  < >  --    < > = values in this interval not displayed.  # Abnormal liver chemistry: Elevated ALP with normal bilirubin, and transaminases. Denies any abdominal pain  Alkp - trending down ( 305-->230 )       Please review my previous note for stable problem list .     Juan Galindo MD  Hospitalist

## 2017-03-10 NOTE — PLAN OF CARE
Problem: Goal/Outcome  Goal: Goal Outcome Summary  Outcome: No Change  Alert and oriented. Able to make needs known. Using call light appropriately. Offers no C/O pain. Requested Melatonin for sleep. Using urinal at bedside, staff empties. ALEXIS, maintaining  O2 sats in mid 90s with 2 L/NC.  PICC patent, hep locked. Back incision CDI, open to air. Has bilateral L/E 2 + edema.  Appears to be resting better after receiving Melatonin. Continue with plan of care.

## 2017-03-10 NOTE — PLAN OF CARE
Problem: Goal/Outcome  Goal: Goal Outcome Summary  Patient is alert and oriented x 4. Patient does complain of some sob, mainly with exertion but per him, way much improved from yesterday. Continues on 02 at 2 liters via nasal cannula. Denies any pain. Back incision is intact, open to air. Continues with 2+ bilateral feet edema, sophy socks are on. Continent of bladder utilizing a urinal independently. Staff assisting to empty urinal. Picc right upper extremity is patent and intact. Continues on IV rocephin. Pt needed  assist of 1 sb for transfers with a sliding board. Family here visiting with pt at this time.

## 2017-03-10 NOTE — PLAN OF CARE
Problem: Goal/Outcome  Goal: Goal Outcome Summary  OT: Increased weakness in BLE this PM during session. Pt engaged in activity seated at EOB without oxygen for approximately 15 minutes with O2 stats around 96%. During BUE exercises pt's O2 stats dropped to 85% and oxygen was worn for the remainder of the session.

## 2017-03-10 NOTE — PROGRESS NOTES
[facility]   Acute Rehabilitation Unit  Daily progress note    interval history  Tad Manning was seen and examined at bedside. Slept ok last night. Had SOB this morning; was assessed by hospitalist Dr. Galindo and CXR was obtained. Showed pulmonary edema but no pleural effusion. He was placed on supplemental O2 and received extra dose of lasix today. BNP elevated as well.     Missed some therapy time due to his symptoms.       medications    [START ON 3/10/2017] insulin glargine  20 Units Subcutaneous QAM AC     [START ON 3/10/2017] furosemide  40 mg Oral Daily     melatonin  3 mg Oral At Bedtime     lisinopril  10 mg Oral Daily     mirtazapine  15 mg Oral At Bedtime     ranitidine  150 mg Oral BID     metoprolol  50 mg Oral BID     metFORMIN  500 mg Oral BID w/meals     clopidogrel  75 mg Oral Daily     ascorbic acid (VITAMIN C) tablet 500 mg  500 mg Oral Daily     aspirin  325 mg Oral Daily     atorvastatin  40 mg Oral Daily     cefTRIAXone  2 g Intravenous Q12H     cholecalciferol  1,000 Units Oral Daily     tamsulosin  0.8 mg Oral At Bedtime     insulin aspart  1-7 Units Subcutaneous TID AC     insulin aspart  1-5 Units Subcutaneous At Bedtime     heparin lock flush  5-10 mL Intracatheter Q24H     acetaminophen, diphenhydrAMINE **OR** diphenhydrAMINE, tramadol, senna-docusate, polyethylene glycol, bisacodyl, - MEDICATION INSTRUCTIONS -, lidocaine, glucose **OR** dextrose **OR** glucagon, naloxone, sodium chloride (PF), heparin lock flush       physical exam  /66  Pulse 91  Temp 95.7  F (35.4  C) (Oral)  Resp 18  Ht 1.829 m (6')  Wt 89.8 kg (198 lb)  SpO2 98%  BMI 26.85 kg/m2     Gen: NAD, resting in bed - NC in place   Heart: regular/irregular  Pulm: diminished breath sounds b/l  Abd: soft and non-tender    Neuro/MSK: unchanged   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 Medical co-morbidities include post-op pain, anemia, hyponatremia, and h/o bacteremia on IV antibiotic. PMH significant for DM with peripheral neuropathy, HTN, HLD, CAD s/p CABG, systolic and diastolic heart failure, and CLL in remission.      Functional deficits are weakness in bilateral lower extremities, sensory loss in feet (due to diabetic neuropathy but worse likely due to SCI), neurogenic bladder and bowel, and acute on chronic deconditioning with poor endurance.         Admission to acute inpatient rehab 2/15.     Rehabilitation: please see interval history for updates. Ordered SLP on 2/16 given his low score on SLUMS 23/30. SLP d/c on 3/2, OT increase to  min/day.      Medical Conditions; appreciate hospitalist team assistance in medical management.      # Incomplete SCI:  # Thoracic osteomyelitis/discitis with epidural abscess:  # S/p T9-10 laminectomy, T7-12 posterior arthrodesis and TF right sided T9-10 fusion.   # Strep mitis bacteremia:      --TLSO for comfort  --wound care per recs; sutures removed at 2 weeks higinio on 2/28.  --PICC line care on the right arm   -nurse reported green discharge 2/22, no clinical s/s of infection - will monitor   --pain management with prn tylenol, tramadol, and oxycodone   -2/19 tylenol was scheduled to help with pain and minimize narcotic use. Also tramadol was added to again minimize oxycodone intake due to side effects like urinary retention.    -2/22 requiring tramadol and oxycodone x1-2/day; will monitor for one more day and dc oxycodone.    -2/24 dc'd oxy.   -3/7changed tylenol to prn given  slightly elevated ALK on 3/6.  Recheck labs on 3/10.  --continue IV rocephin for total of 6 weeks - end date 3/24. 3/3 completed PLC class.   --should f/u with ID and neurosurgery teams as outpatient      ** Check CBC, CRP, creatinine, and LFTs weekly while on therapy and have results faxed to the [facility] at [phone]. - faxed on 2/21, 2/28 and 3/8.     Hyponatremia: resolved since 2/12 and stable ~ 131. 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 Most recent labs 139, 141, 140.    Anemia: likely due to acute illness and blood loss; stable since his surgery. Will monitor clinically and repeat labs as needed. Transfused 1 unit blood 2/25.    Anxiety/depressed mood/insomnia: patient reports history of occasional nighttime anxiety associated with inability to sleep prior to this admission. In the past, he has taken Zolpidem prn for this. Now complains of three consecutive nights of anxiety. Was given 12.5mg seroquel 2/22, patient says he thinks it helped.    --started scheduled ramelteon and prn seroquel on 2/23. 3/2 changed ramelteon to remeron 15 qhs due to no benefits and also to help with mood and appetite. 3/7 added melatonin and dc'd seroqul per hospitalist recs.   ** increased remeron to 30 on 3/8.  --discussed with pt and ordered rehab psychology consult      # DM type II with neuropathy: on glipizide and victoza injections prior to admission. HgbA1C was 6.1% on 2/10. Currently on lantus 12 units daily and SSI. 2/17 lantus was increased to 14 for better control. 2/21 lantus increased to 20 daily and also was started on metformin 500 bid. 2/28 not requiring SSI. 3/1 stopped BG check at 2am.     ** has a chronic diabetic ulcer on the plantar/ lateral aspect of his right foot; stable since admission.      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 Continue ASA; plavix restarted on 2/20.     # HTN: on  "amlodipine, atenolol, lisinopril and prinzide (?) prior to admission. Currently on atenolol 25 daily; BP was variable with new arrhythmia - see below. 3/7 lisinopril dose was increased for better control.     # Orthostatic hypotension - resolved: as confirmed by PT on 2/17. Multifactorial due to anemia, severe deconditioning and possibly autonomic instability due SCI. Should have TEDs and abdominal binder before getting out of bed. Ensure adequate hydration. Orthostatic precautions. See below as well.     # Tachycardia with irregular rhythm/A flutter: occurred during PT session on 2/21 while on tilt table; tachycardia is multifactorial as orthostatic hypotension. Spoke with Dr. Lange hospitalist, appreciated assistance. Cortisol wnl 2/22. Follow orthostatic precautions. Asymptomatic at this point. 2/22 repeat EKG with atrial flutter and variable block. Cardiology team was consulted; appreciate assistance. Recommended to continue current regimen with ASA and plavix and f/u as outpatient to review the plan for anticoagulation. 2/24 repeat TTE showed AFib and reduced EF; atenolol was changed to metoprolol for better rate control. 1 unit blood transfused 2/25.  --now in NSR  --continue metoprolol 50 bid, ASA and plavix   --f/u as outpatient to review the plan for anticoagulation    # HLD: on lipitor 40 daily. LDL was 48 in 5/2014 when last checked.      # CAD s/p CABG in 1995 with HFrEF (EF 40-45%):   # Acute systolic CHF:  no active issues upon admission. Had issues with orthostatic hypotension and A flutter as above. 2/24 repeat TTE showed worsening of his cardiac function. Per cardiology note, \"suspect EF is worse b/c tachycardia mediated from aflutter. Other possibility is stress induced. Less likely ischemic\". Was started on lisinopril 2.5mg daily, which was increased to 5mg daily on 2/28.  He also developed b/l pleural effusion following a blood transfusion on Sat 2/25; presented with acute onset SOB. Was started " on lasix with some clinical improvement since then.   3/9 another episode for acute onset SOB; CXR with pulmonary edema but no effusion. Received extra dose of lasix, monitor closely.     --continue ASA, lipitor and atenolol  --continue lisinopril 5mg daily; will titrate gradually to 10mg daily if BP remains > 120/80 per recs   --continue lasix 20mg daily     # H/o CLL in remission: last chemo in 2015; is followed by oncology team - no active issues.        FEN: regular, mod consistent CHO.     Bowel: ? Neurogenic bowel - on prn bowel meds upon admission with last BM on 2/14. Will monitor and adjust meds/bowel regimen as needed. 2/17 scheduled bowel meds. 2/20 had loose BMs over the weekend so meds were changed to prn; started on metformin which can be contributing as well. 2/28 regular daily BMs on current regimen.    Bladder: ? Neurogenic bladder - h/o BPH on flomax prior to admission; was not resumed after admission to the hospital likely due to low BP but restarted upon admission to ARU. PVRs elevated and was cathed once on 2/18. Plan is to minimize narcotic use, void in standing/sitting position as much as possible, and continue to monitor PVRs. 2/28 low PVRs.     DVT Prophylaxis: mechanical; per neurosurgery team notes Ok to start chemical prophylaxis on 2/16 - will clarify. Discussed and started lovenox 40 daily on 2/17. 2/21 dc'd lovenox per hospitalist team recs as he was started on plavix.     GI Prophylaxis: none, oral intake.     Code: full    Disposition: home vs TCU pending progress and family support. Discussed in team rounds. Care conf 3/1.    ELOS:  3-4 weeks. Care conference on 3/1. Discussed in team rounds on 3/8 - see notes.     Follow up Appointments on Discharge: ID, neurosurgery, PCP and PM&R. + cardiology    Follow-up with neurosurgery, Dr. Marcelo Trent MD in 2 weeks for wound check.       Follow-up with Infectious Disease, Dr. Gillian Vila, on 3/23/17. If you have not heard back  regarding your appointment time please call [phone] and ask to page the Infectious Disease fellow regarding your pre-planned appointment with Dr. Vila on 3/23/17 to assess antibiotic regimen and progress    Hyun Koehler MD  Physical Medicine & Rehabilitation      I spent a total of 20 minutes face-to-face or managing the care of Tad Manning. Over 50% of my time on the unit was spent counseling the patient and coordinating care. See note for details.

## 2017-03-11 LAB
GLUCOSE BLDC GLUCOMTR-MCNC: 130 MG/DL (ref 70–99)
GLUCOSE BLDC GLUCOMTR-MCNC: 181 MG/DL (ref 70–99)
GLUCOSE BLDC GLUCOMTR-MCNC: 195 MG/DL (ref 70–99)
GLUCOSE BLDC GLUCOMTR-MCNC: 204 MG/DL (ref 70–99)

## 2017-03-11 PROCEDURE — 25000128 H RX IP 250 OP 636: Performed by: INTERNAL MEDICINE

## 2017-03-11 PROCEDURE — 12800006 ZZH R&B REHAB

## 2017-03-11 PROCEDURE — 25000132 ZZH RX MED GY IP 250 OP 250 PS 637: Mod: GY | Performed by: INTERNAL MEDICINE

## 2017-03-11 PROCEDURE — 97530 THERAPEUTIC ACTIVITIES: CPT | Mod: GP

## 2017-03-11 PROCEDURE — A9270 NON-COVERED ITEM OR SERVICE: HCPCS | Mod: GY | Performed by: INTERNAL MEDICINE

## 2017-03-11 PROCEDURE — 97110 THERAPEUTIC EXERCISES: CPT | Mod: GP

## 2017-03-11 PROCEDURE — 97530 THERAPEUTIC ACTIVITIES: CPT | Mod: GO

## 2017-03-11 PROCEDURE — 97110 THERAPEUTIC EXERCISES: CPT | Mod: GO

## 2017-03-11 PROCEDURE — A9270 NON-COVERED ITEM OR SERVICE: HCPCS | Mod: GY | Performed by: PHYSICAL MEDICINE & REHABILITATION

## 2017-03-11 PROCEDURE — 25000125 ZZHC RX 250: Performed by: PHYSICAL MEDICINE & REHABILITATION

## 2017-03-11 PROCEDURE — 40000133 ZZH STATISTIC OT WARD VISIT

## 2017-03-11 PROCEDURE — 00000146 ZZHCL STATISTIC GLUCOSE BY METER IP

## 2017-03-11 PROCEDURE — 99207 ZZC CDG-MDM COMPONENT: MEETS MODERATE - UP CODED: CPT | Performed by: INTERNAL MEDICINE

## 2017-03-11 PROCEDURE — 40000193 ZZH STATISTIC PT WARD VISIT

## 2017-03-11 PROCEDURE — 99231 SBSQ HOSP IP/OBS SF/LOW 25: CPT | Performed by: INTERNAL MEDICINE

## 2017-03-11 PROCEDURE — 25000132 ZZH RX MED GY IP 250 OP 250 PS 637: Mod: GY | Performed by: PHYSICAL MEDICINE & REHABILITATION

## 2017-03-11 PROCEDURE — 97535 SELF CARE MNGMENT TRAINING: CPT | Mod: GO

## 2017-03-11 RX ADMIN — METFORMIN HYDROCHLORIDE 500 MG: 500 TABLET ORAL at 08:03

## 2017-03-11 RX ADMIN — RANITIDINE HYDROCHLORIDE 150 MG: 150 TABLET, FILM COATED ORAL at 20:06

## 2017-03-11 RX ADMIN — CEFTRIAXONE 2 G: 2 INJECTION, POWDER, FOR SOLUTION INTRAMUSCULAR; INTRAVENOUS at 20:03

## 2017-03-11 RX ADMIN — METOPROLOL TARTRATE 50 MG: 50 TABLET, FILM COATED ORAL at 08:02

## 2017-03-11 RX ADMIN — METFORMIN HYDROCHLORIDE 500 MG: 500 TABLET ORAL at 17:15

## 2017-03-11 RX ADMIN — MIRTAZAPINE 15 MG: 7.5 TABLET, FILM COATED ORAL at 20:06

## 2017-03-11 RX ADMIN — MELATONIN TAB 3 MG 3 MG: 3 TAB at 22:57

## 2017-03-11 RX ADMIN — INSULIN ASPART 2 UNITS: 100 INJECTION, SOLUTION INTRAVENOUS; SUBCUTANEOUS at 08:14

## 2017-03-11 RX ADMIN — MELATONIN TAB 3 MG 3 MG: 3 TAB at 00:02

## 2017-03-11 RX ADMIN — TAMSULOSIN HYDROCHLORIDE 0.8 MG: 0.4 CAPSULE ORAL at 20:06

## 2017-03-11 RX ADMIN — RANITIDINE HYDROCHLORIDE 150 MG: 150 TABLET, FILM COATED ORAL at 08:02

## 2017-03-11 RX ADMIN — FUROSEMIDE 20 MG: 20 TABLET ORAL at 15:58

## 2017-03-11 RX ADMIN — VITAMIN D, TAB 1000IU (100/BT) 1000 UNITS: 25 TAB at 08:02

## 2017-03-11 RX ADMIN — ATORVASTATIN CALCIUM 40 MG: 40 TABLET, FILM COATED ORAL at 08:02

## 2017-03-11 RX ADMIN — OXYCODONE HYDROCHLORIDE AND ACETAMINOPHEN 500 MG: 500 TABLET ORAL at 08:02

## 2017-03-11 RX ADMIN — INSULIN GLARGINE 20 UNITS: 100 INJECTION, SOLUTION SUBCUTANEOUS at 08:14

## 2017-03-11 RX ADMIN — ASPIRIN 325 MG ORAL TABLET 325 MG: 325 PILL ORAL at 08:02

## 2017-03-11 RX ADMIN — SODIUM CHLORIDE, PRESERVATIVE FREE 5 ML: 5 INJECTION INTRAVENOUS at 09:12

## 2017-03-11 RX ADMIN — INSULIN ASPART 1 UNITS: 100 INJECTION, SOLUTION INTRAVENOUS; SUBCUTANEOUS at 12:21

## 2017-03-11 RX ADMIN — METOPROLOL TARTRATE 50 MG: 50 TABLET, FILM COATED ORAL at 20:06

## 2017-03-11 RX ADMIN — CEFTRIAXONE 2 G: 2 INJECTION, POWDER, FOR SOLUTION INTRAMUSCULAR; INTRAVENOUS at 08:01

## 2017-03-11 RX ADMIN — CLOPIDOGREL BISULFATE 75 MG: 75 TABLET, FILM COATED ORAL at 08:02

## 2017-03-11 RX ADMIN — SODIUM CHLORIDE, PRESERVATIVE FREE 5 ML: 5 INJECTION INTRAVENOUS at 20:06

## 2017-03-11 RX ADMIN — FUROSEMIDE 40 MG: 40 TABLET ORAL at 08:02

## 2017-03-11 RX ADMIN — LISINOPRIL 10 MG: 10 TABLET ORAL at 08:03

## 2017-03-11 NOTE — PLAN OF CARE
Problem: Goal/Outcome  Goal: Goal Outcome Summary  Pt attended Falls Prevention class today with group of 6 patients. Pt selected for class due to documented gait deficit and falls risk. Class includes education in falls risks, how to decrease that risk through behavior and home modifications and energy conservation; and instruction in available equipment designed to increase home safety. Pt was able to verbalize understanding of materials and participated appropriately in the discussion and problem-solving segments of the class.

## 2017-03-11 NOTE — PROGRESS NOTES
Briefly saw the patient   Off oxygen   Less short of breath   Blood pressure 123/67, pulse 107, temperature 96.9  F (36.1  C), temperature source Oral, resp. rate 17, height 1.829 m (6'), weight 88.2 kg (194 lb 6.4 oz), SpO2 92 %.    Lungs: bilateral crepitations -at bases   Cvs; regular   Cns; alert awake oriented X 3     Assessment and plan:     Tad Manning is a 76-year-old  gentleman transferred from the [facility] after T7-T12 posterior spinal segmental instrumentation and T9-T10 interbody fusion and posterior arthrodesis T70-T12 for a spinal abscess with osteomyelitis and diskitis.      # Acute systolic CHF: Started on Furosemide 20 mg daily 2/26  Echo on 02/24 showed decreased LVEF and severe global hypokinesis.Lisinopril was started and then titrated up to 5 mg daily 2/28/2017  - Increased Lisinopril to 10 mg daily on 3/6/17  - on Furosemide 20 mg daily And Metoprolol 50 mg BID   - in view of increased  short ness of breath - got a chest x-ray - showed pulmonary edema - gave an extra dose lasix 20 mg and increased his daily dose to 40 mg On 3/9  3/10 -added additional 20 mg of lasix at 4 PM   Improving   Monitor renal functions     - FOllow up with [facility] cardiology in one month after discharge with repeat Echo to assess EF and rate control.     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   The patient should have a CBC, CRP, creatinine and LFTs done weekly.   CRP 19.9 on 03/06 and improving  Followup with Infectious Disease, Dr. Gillian Max on 03/23/2017.      # Diabetes mellitus: stable   Glargine was increased to 20 units 2/19, and then decreased to 18 Units on 03/01 Metformin (home medication) was started on 02/20  - Continue Basal, Correctional insulin          Recent Labs  Lab 03/10/17  0724 03/10/17  0712 03/09/17  2052  03/09/17  1716 03/09/17  1133 03/09/17  1130 03/09/17  0717 03/08/17  2138   03/06/17  0629   * --  --  --  --  232* --  --  --  115*   BGM --  149* 145* 153* 238* --  172* 137* < > --    < > = values in this interval not displayed.    # Abnormal liver chemistry: Elevated ALP with normal bilirubin, and transaminases. Denies any abdominal pain  Alkp - trending down ( 305-->230 )       Juan WadeHenry J. Carter Special[facility] and [facility] Facility   Hospitalist

## 2017-03-11 NOTE — PLAN OF CARE
Problem: Goal/Outcome  Goal: Goal Outcome Summary  OT: Pt. Completed seated endurance, balance and core strengthening activities while monitoring O2 on RA. O2 stayed WNL throughout despite pt. Getting SOB and requiring frequent rest breaks. Educated on breathing strategies.

## 2017-03-11 NOTE — PLAN OF CARE
Problem: Goal/Outcome  Goal: Goal Outcome Summary  Outcome: Therapy, progress toward functional goals as expected  Pt is alert and oriented x 3, no c/o pain or discomfort. He is in a process of weaning off oxygen, see vs flow sheet and continue monitoring. Double lumen PICC line in the right upper arm is clean, dry, and intact, it is hep locked. He has mepilex dressing on coccyx, it is clean, dry and intact. We will continue with current plan of care.

## 2017-03-11 NOTE — PLAN OF CARE
Problem: Goal/Outcome  Goal: Goal Outcome Summary  PT- pt pleasant and cooperative-B l/e supine therex was challenging

## 2017-03-11 NOTE — PLAN OF CARE
Problem: Goal/Outcome  Goal: Goal Outcome Summary  Outcome: No Change  FOCUS/GOAL  Skin integrity     ASSESSMENT, INTERVENTIONS AND CONTINUING PLAN FOR GOAL:  Pt has open areas on coccyx and right buttock and erythema to whole buttocks noted, new mepilex dressing applied after shower.   Barrier cream applied. Required min assist to turn side to side in bed.   Pt reports no SOB during shower. O2 sats 98% on O2 2L/NC. BGs good.

## 2017-03-11 NOTE — PROGRESS NOTES
Patient Active Problem List   Diagnosis     Critical lower limb ischemia , s/p L AK pop to BK pop bypass with rGSV on 1/10/2014     Diabetes mellitus, type 2 (H)     HTN (hypertension)     CAD (coronary artery disease)     Hyperlipidemia with target LDL less than 100     Osteomyelitis left 2nd Toe s/p amputaion 1/10/14     Renal insufficiency     Renal failure     Fall     Discitis     Epidural abscess     Osteomyelitis of thoracic spine (H)     Atrial flutter (H)       Patient seen and examined today. He was observed in therapy session as well. Coordinated with team.      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 Medical co-morbidities include post-op pain, anemia, hyponatremia, and h/o bacteremia on IV antibiotic. PMH significant for DM with peripheral neuropathy, HTN, HLD, CAD s/p CABG, systolic and diastolic heart failure, and CLL in remission.       Functional deficits are weakness in bilateral lower extremities, sensory loss in feet (due to diabetic neuropathy but worse likely due to SCI), neurogenic bladder and bowel, and acute on chronic deconditioning with poor endurance.     Main issues today are ongoing functional recovery. Observed in therapies.   He is working on transferring in parallel bars, requiring mod assist to stand, requiring mod assist for keeping knees extended while standing and for posture and mechanics. He is wheel chair bound.   Transfers from sit to stand are going well wit set up using a board.   He is well aware of the plan, with home eval on Tuesday to assess the feasibility to go home.   Continue intense rehabilitation.   He has been weaned off oxygen.     REVIEW OF THE SYSTEMS   Total of ten systems reviewed, pertinent positives and negatives as follows  Denies chest pain fever chills  rigors  Denies any shortness of breath   Denies any nausea or vomiting   Appetite fair  Denies any lightheadedness or dizziness   On regular diet with thins  Denies any pain   Denies any sob or cough   Denies any new rashes   Mood euphoric   Slept well last night   Remainder of the review of the systems was negative      Physical exam    Vital signs:  Temp: 96.9  F (36.1  C) Temp src: Oral BP: 123/67 Pulse: 107 Heart Rate: 82 Resp: 17 SpO2: 92 % O2 Device: None (Room air) Oxygen Delivery:  (Room Air) Height: 182.9 cm (6') Weight: 88.2 kg (194 lb 6.4 oz) (bed scale)  Estimated body mass index is 26.37 kg/(m^2) as calculated from the following:    Height as of this encounter: 1.829 m (6').    Weight as of this encounter: 88.2 kg (194 lb 6.4 oz).  HEENT NC AT PRTL EOM good   Neck supple  Heart S1S2  Lungs CTA  Abdomen  benign BS positive NT NR   LE no edema     Significant atrophy of all the muscle groups most pronounced in the LE specially the quads         REVIEWED THE MEDICATIONS BELOW   Current Facility-Administered Medications   Medication     furosemide (LASIX) tablet 20 mg     melatonin tablet 3 mg     insulin glargine (LANTUS) injection 20 Units     furosemide (LASIX) tablet 40 mg     acetaminophen (TYLENOL) tablet 650 mg     lisinopril (PRINIVIL/ZESTRIL) tablet 10 mg     mirtazapine (REMERON) tablet TABS 15 mg     ranitidine (ZANTAC) tablet 150 mg     metoprolol (LOPRESSOR) tablet 50 mg     diphenhydrAMINE (BENADRYL) capsule 25 mg    Or     diphenhydrAMINE (BENADRYL) injection 25 mg     metFORMIN (GLUCOPHAGE) tablet 500 mg     clopidogrel (PLAVIX) tablet 75 mg     traMADol (ULTRAM) half-tab 25-50 mg     senna-docusate (SENOKOT-S;PERICOLACE) 8.6-50 MG per tablet 1 tablet     polyethylene glycol (MIRALAX/GLYCOLAX) Packet 17 g     bisacodyl (DULCOLAX) Suppository 10 mg     Patient is already receiving anticoagulation with heparin, enoxaparin (LOVENOX), warfarin (COUMADIN)  or other anticoagulant medication      ascorbic acid (VITAMIN C) tablet 500 mg     aspirin tablet 325 mg     atorvastatin (LIPITOR) tablet 40 mg     cefTRIAXone (ROCEPHIN) 2 g vial to attach to  ml bag for ADULTS or NS 50 ml bag for PEDS     cholecalciferol (vitamin D) tablet 1,000 Units     lidocaine (LIDODERM) 5 % Patch 1 patch     tamsulosin (FLOMAX) capsule 0.8 mg     glucose 40 % gel 15-30 g    Or     dextrose 50 % injection 25-50 mL    Or     glucagon injection 1 mg     insulin aspart (NovoLOG) inj (RAPID ACTING)     insulin aspart (NovoLOG) inj (RAPID ACTING)     naloxone (NARCAN) injection 0.1-0.4 mg     sodium chloride (PF) 0.9% PF flush 10-20 mL     heparin lock flush 10 UNIT/ML injection 5-10 mL     heparin lock flush 10 UNIT/ML injection 5-10 mL     Facility-Administered Medications Ordered in Other Encounters   Medication     prochlorperazine (COMPAZINE) injection 5 mg

## 2017-03-11 NOTE — PROGRESS NOTES
[facility]   Acute Rehabilitation Unit  Daily progress note    interval history  Tad Manning was seen and examined at bedside. No issues overnight; slept ok. His SO was present at bedside; long discussion with them regarding discharge planning. Also called his niece Mily and answered all questions. Discussed recent hypoxia and pulmonary edema.     He can potentially go home WC based and mod I with mobility and ADL, barriers are 1-WC accessibility of his apartment, 2-complicated medical issues and 3-adjustment to his disability and 4- family support to provide help if needed. Plan is to have home eval on Tuesday 3/14 and try WC at home. If feasible and pt in agreement, will continue intensive IP rehab and dc home with home services. Might require TCU placement pending his medical needs and progress.     medications    furosemide  20 mg Oral QPM     insulin glargine  20 Units Subcutaneous QAM AC     furosemide  40 mg Oral Daily     lisinopril  10 mg Oral Daily     mirtazapine  15 mg Oral At Bedtime     ranitidine  150 mg Oral BID     metoprolol  50 mg Oral BID     metFORMIN  500 mg Oral BID w/meals     clopidogrel  75 mg Oral Daily     ascorbic acid (VITAMIN C) tablet 500 mg  500 mg Oral Daily     aspirin  325 mg Oral Daily     atorvastatin  40 mg Oral Daily     cefTRIAXone  2 g Intravenous Q12H     cholecalciferol  1,000 Units Oral Daily     tamsulosin  0.8 mg Oral At Bedtime     insulin aspart  1-7 Units Subcutaneous TID AC     insulin aspart  1-5 Units Subcutaneous At Bedtime     heparin lock flush  5-10 mL Intracatheter Q24H     melatonin, acetaminophen, diphenhydrAMINE **OR** diphenhydrAMINE, tramadol, senna-docusate, polyethylene glycol, bisacodyl, - MEDICATION INSTRUCTIONS -, lidocaine, glucose **OR** dextrose **OR** glucagon, naloxone, sodium chloride (PF), heparin lock flush       physical exam  /54 (BP Location: Left arm)  Pulse 98  Temp 97.9  F (36.6   C) (Oral)  Resp 18  Ht 1.829 m (6')  Wt 87.3 kg (192 lb 8 oz)  SpO2 92%  BMI 26.11 kg/m2     Gen: NAD, sitting in WC - NC in place   Pulm: non-labored   Neuro/MSK: deferred to longer conversation today       assessment and plan  Tad Manning is a 76 year old right hand dominant male with 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 Medical co-morbidities include post-op pain, anemia, hyponatremia, and h/o bacteremia on IV antibiotic. PMH significant for DM with peripheral neuropathy, HTN, HLD, CAD s/p CABG, systolic and diastolic heart failure, and CLL in remission.      Functional deficits are weakness in bilateral lower extremities, sensory loss in feet (due to diabetic neuropathy but worse likely due to SCI), neurogenic bladder and bowel, and acute on chronic deconditioning with poor endurance.         Admission to acute inpatient rehab 2/15.     Rehabilitation: please see interval history for updates. Ordered SLP on 2/16 given his low score on SLUMS 23/30. SLP d/c on 3/2, OT increase to  min/day.      Medical Conditions; appreciate hospitalist team assistance in medical management.      # Incomplete SCI:  # Thoracic osteomyelitis/discitis with epidural abscess:  # S/p T9-10 laminectomy, T7-12 posterior arthrodesis and TF right sided T9-10 fusion.   # Strep mitis bacteremia:      --TLSO for comfort  --wound care per recs; sutures removed at 2 weeks higinio on 2/28.  --PICC line care on the right arm   -nurse reported green discharge 2/22, no clinical s/s of infection - will monitor   --pain management with prn tylenol, tramadol, and oxycodone   -2/19 tylenol was scheduled to help with pain and minimize narcotic use. Also tramadol was added to again minimize oxycodone intake due to side effects like urinary retention.    -2/22 requiring tramadol and oxycodone x1-2/day; will monitor for one more day and dc  oxycodone.    -2/24 dc'd oxy.   -3/7changed tylenol to prn given slightly elevated ALK on 3/6.  Recheck labs on 3/10 ALK in lower range but still elevated.   --continue IV rocephin for total of 6 weeks - end date 3/24. 3/3 completed PLC class.   --should f/u with ID and neurosurgery teams as outpatient      ** Check CBC, CRP, creatinine, and LFTs weekly while on therapy and have results faxed to the [facility] at [phone]. - faxed on 2/21, 2/28 and 3/8.     Hyponatremia: resolved since 2/12 and stable ~ 131. 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 Most recent labs 139, 141, 140.    Anemia: likely due to acute illness and blood loss; stable since his surgery. Will monitor clinically and repeat labs as needed. Transfused 1 unit blood 2/25.    Anxiety/depressed mood/insomnia: patient reports history of occasional nighttime anxiety associated with inability to sleep prior to this admission. In the past, he has taken Zolpidem prn for this. Now complains of three consecutive nights of anxiety. Was given 12.5mg seroquel 2/22, patient says he thinks it helped.    --started scheduled ramelteon and prn seroquel on 2/23. 3/2 changed ramelteon to remeron 15 qhs due to no benefits and also to help with mood and appetite. 3/7 added melatonin and dc'd seroqul per hospitalist recs.   ** increased remeron to 30 on 3/8.  --discussed with pt and ordered rehab psychology consult      # DM type II with neuropathy: on glipizide and victoza injections prior to admission. HgbA1C was 6.1% on 2/10. Currently on lantus 12 units daily and SSI. 2/17 lantus was increased to 14 for better control. 2/21 lantus increased to 20 daily and also was started on metformin 500 bid. 2/28 not requiring SSI. 3/1 stopped BG check at 2am.     ** has a chronic diabetic ulcer on the plantar/ lateral aspect of his right foot; stable since admission.      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 Continue ASA; plavix restarted on 2/20.     # HTN: on amlodipine, atenolol, lisinopril and prinzide (?) prior to admission. Currently on atenolol 25 daily; BP was variable with new arrhythmia - see below. 3/7 lisinopril dose was increased for better control.     # Orthostatic hypotension - resolved: as confirmed by PT on 2/17. Multifactorial due to anemia, severe deconditioning and possibly autonomic instability due SCI. Should have TEDs and abdominal binder before getting out of bed. Ensure adequate hydration. Orthostatic precautions. See below as well.     # Tachycardia with irregular rhythm/A flutter: occurred during PT session on 2/21 while on tilt table; tachycardia is multifactorial as orthostatic hypotension. Spoke with Dr. Lange hospitalist, appreciated assistance. Cortisol wnl 2/22. Follow orthostatic precautions. Asymptomatic at this point. 2/22 repeat EKG with atrial flutter and variable block. Cardiology team was consulted; appreciate assistance. Recommended to continue current regimen with ASA and plavix and f/u as outpatient to review the plan for anticoagulation. 2/24 repeat TTE showed AFib and reduced EF; atenolol was changed to metoprolol for better rate control. 1 unit blood transfused 2/25.  --now in NSR  --continue metoprolol 50 bid, ASA and plavix   --f/u as outpatient to review the plan for anticoagulation    # HLD: on lipitor 40 daily. LDL was 48 in 5/2014 when last checked.      # CAD s/p CABG in 1995 with HFrEF (EF 40-45%):   # Acute systolic CHF:  no active issues upon admission. Had issues with orthostatic hypotension and A flutter as above. 2/24 repeat TTE showed worsening of his cardiac function. Per cardiology note, \"suspect EF is worse b/c tachycardia mediated from aflutter. Other possibility is stress induced. Less likely ischemic\". Was started on lisinopril 2.5mg daily, which was increased to 5mg daily on 2/28.  He also developed b/l " pleural effusion following a blood transfusion on Sat 2/25; presented with acute onset SOB. Was started on lasix with some clinical improvement since then.   3/9 another episode for acute onset SOB; CXR with pulmonary edema but no effusion. Received extra dose of lasix, monitor closely.     --continue ASA, lipitor and atenolol  --continue lisinopril 5mg daily; will titrate gradually to 10mg daily if BP remains > 120/80 per recs   --continue lasix 20mg daily     # H/o CLL in remission: last chemo in 2015; is followed by oncology team - no active issues.        FEN: regular, mod consistent CHO.     Bowel: ? Neurogenic bowel - on prn bowel meds upon admission with last BM on 2/14. Will monitor and adjust meds/bowel regimen as needed. 2/17 scheduled bowel meds. 2/20 had loose BMs over the weekend so meds were changed to prn; started on metformin which can be contributing as well. 2/28 regular daily BMs on current regimen.    Bladder: ? Neurogenic bladder - h/o BPH on flomax prior to admission; was not resumed after admission to the hospital likely due to low BP but restarted upon admission to ARU. PVRs elevated and was cathed once on 2/18. Plan is to minimize narcotic use, void in standing/sitting position as much as possible, and continue to monitor PVRs. 2/28 low PVRs.     DVT Prophylaxis: mechanical; per neurosurgery team notes Ok to start chemical prophylaxis on 2/16 - will clarify. Discussed and started lovenox 40 daily on 2/17. 2/21 dc'd lovenox per hospitalist team recs as he was started on plavix.     GI Prophylaxis: none, oral intake.     Code: full    Disposition: home vs TCU pending progress and family support. Discussed in team rounds. Care conf 3/1.    ELOS:  3-4 weeks. Care conference on 3/1. Discussed in team rounds on 3/8 - see notes.     Follow up Appointments on Discharge: ID, neurosurgery, PCP and PM&R. + cardiology    Follow-up with neurosurgery, Dr. Marcelo Trent MD in 2 weeks for wound check.        Follow-up with Infectious Disease, Dr. Gillian Vila, on 3/23/17. If you have not heard back regarding your appointment time please call [phone] and ask to page the Infectious Disease fellow regarding your pre-planned appointment with Dr. Vila on 3/23/17 to assess antibiotic regimen and progress    Hyun Koehler MD  Physical Medicine & Rehabilitation      I spent a total of 40 minutes face-to-face or managing the care of Tad Manning. Over 50% of my time on the unit was spent counseling the patient and coordinating care. See note for details.

## 2017-03-11 NOTE — PLAN OF CARE
Problem: Goal/Outcome  Goal: Goal Outcome Summary  Outcome: No Change  Alert and oriented to self, place and time.C/o insomnia, melatonin administered at midnight, patient had been sleeping since, assist of 2 to boost up in bed. slept most of the night, will continue POC.

## 2017-03-12 ENCOUNTER — APPOINTMENT (OUTPATIENT)
Dept: GENERAL RADIOLOGY | Facility: CLINIC | Age: 77
DRG: 949 | End: 2017-03-12
Attending: INTERNAL MEDICINE
Payer: MEDICARE

## 2017-03-12 LAB
ANION GAP SERPL CALCULATED.3IONS-SCNC: 10 MMOL/L (ref 3–14)
BUN SERPL-MCNC: 14 MG/DL (ref 7–30)
CALCIUM SERPL-MCNC: 7.9 MG/DL (ref 8.5–10.1)
CHLORIDE SERPL-SCNC: 106 MMOL/L (ref 94–109)
CO2 SERPL-SCNC: 26 MMOL/L (ref 20–32)
CREAT SERPL-MCNC: 0.57 MG/DL (ref 0.66–1.25)
ERYTHROCYTE [DISTWIDTH] IN BLOOD BY AUTOMATED COUNT: 17.2 % (ref 10–15)
GFR SERPL CREATININE-BSD FRML MDRD: ABNORMAL ML/MIN/1.7M2
GLUCOSE BLDC GLUCOMTR-MCNC: 132 MG/DL (ref 70–99)
GLUCOSE BLDC GLUCOMTR-MCNC: 156 MG/DL (ref 70–99)
GLUCOSE BLDC GLUCOMTR-MCNC: 165 MG/DL (ref 70–99)
GLUCOSE BLDC GLUCOMTR-MCNC: 175 MG/DL (ref 70–99)
GLUCOSE SERPL-MCNC: 194 MG/DL (ref 70–99)
HCT VFR BLD AUTO: 28 % (ref 40–53)
HGB BLD-MCNC: 8.5 G/DL (ref 13.3–17.7)
MAGNESIUM SERPL-MCNC: 1.5 MG/DL (ref 1.6–2.3)
MCH RBC QN AUTO: 27.2 PG (ref 26.5–33)
MCHC RBC AUTO-ENTMCNC: 30.4 G/DL (ref 31.5–36.5)
MCV RBC AUTO: 90 FL (ref 78–100)
PLATELET # BLD AUTO: 175 10E9/L (ref 150–450)
POTASSIUM SERPL-SCNC: 3 MMOL/L (ref 3.4–5.3)
RBC # BLD AUTO: 3.13 10E12/L (ref 4.4–5.9)
SODIUM SERPL-SCNC: 142 MMOL/L (ref 133–144)
TROPONIN I SERPL-MCNC: 0.03 UG/L (ref 0–0.04)
WBC # BLD AUTO: 5.9 10E9/L (ref 4–11)

## 2017-03-12 PROCEDURE — A9270 NON-COVERED ITEM OR SERVICE: HCPCS | Mod: GY | Performed by: PHYSICAL MEDICINE & REHABILITATION

## 2017-03-12 PROCEDURE — 25000125 ZZHC RX 250: Performed by: PHYSICAL MEDICINE & REHABILITATION

## 2017-03-12 PROCEDURE — 97110 THERAPEUTIC EXERCISES: CPT | Mod: GO

## 2017-03-12 PROCEDURE — 25000132 ZZH RX MED GY IP 250 OP 250 PS 637: Mod: GY | Performed by: INTERNAL MEDICINE

## 2017-03-12 PROCEDURE — 85027 COMPLETE CBC AUTOMATED: CPT | Performed by: INTERNAL MEDICINE

## 2017-03-12 PROCEDURE — A9270 NON-COVERED ITEM OR SERVICE: HCPCS | Mod: GY | Performed by: INTERNAL MEDICINE

## 2017-03-12 PROCEDURE — 40000193 ZZH STATISTIC PT WARD VISIT

## 2017-03-12 PROCEDURE — 12800006 ZZH R&B REHAB

## 2017-03-12 PROCEDURE — 83735 ASSAY OF MAGNESIUM: CPT | Performed by: INTERNAL MEDICINE

## 2017-03-12 PROCEDURE — 25000128 H RX IP 250 OP 636: Performed by: INTERNAL MEDICINE

## 2017-03-12 PROCEDURE — 97530 THERAPEUTIC ACTIVITIES: CPT | Mod: GO

## 2017-03-12 PROCEDURE — 40000193 ZZH STATISTIC PT WARD VISIT: Performed by: PHYSICAL THERAPIST

## 2017-03-12 PROCEDURE — 99207 ZZC MOONLIGHTING INDICATOR: CPT | Performed by: INTERNAL MEDICINE

## 2017-03-12 PROCEDURE — 71010 XR CHEST PORT 1 VW: CPT

## 2017-03-12 PROCEDURE — 36592 COLLECT BLOOD FROM PICC: CPT | Performed by: INTERNAL MEDICINE

## 2017-03-12 PROCEDURE — 97535 SELF CARE MNGMENT TRAINING: CPT | Mod: GO

## 2017-03-12 PROCEDURE — 97112 NEUROMUSCULAR REEDUCATION: CPT | Mod: GP | Performed by: PHYSICAL THERAPIST

## 2017-03-12 PROCEDURE — 97530 THERAPEUTIC ACTIVITIES: CPT | Mod: GP | Performed by: PHYSICAL THERAPIST

## 2017-03-12 PROCEDURE — 00000146 ZZHCL STATISTIC GLUCOSE BY METER IP

## 2017-03-12 PROCEDURE — 97530 THERAPEUTIC ACTIVITIES: CPT | Mod: GP

## 2017-03-12 PROCEDURE — 84484 ASSAY OF TROPONIN QUANT: CPT | Performed by: INTERNAL MEDICINE

## 2017-03-12 PROCEDURE — 25000132 ZZH RX MED GY IP 250 OP 250 PS 637: Mod: GY | Performed by: PHYSICAL MEDICINE & REHABILITATION

## 2017-03-12 PROCEDURE — 97110 THERAPEUTIC EXERCISES: CPT | Mod: GP

## 2017-03-12 PROCEDURE — 40000133 ZZH STATISTIC OT WARD VISIT

## 2017-03-12 PROCEDURE — 97110 THERAPEUTIC EXERCISES: CPT | Mod: GP | Performed by: PHYSICAL THERAPIST

## 2017-03-12 PROCEDURE — 93005 ELECTROCARDIOGRAM TRACING: CPT

## 2017-03-12 PROCEDURE — 80048 BASIC METABOLIC PNL TOTAL CA: CPT | Performed by: INTERNAL MEDICINE

## 2017-03-12 RX ORDER — POTASSIUM CHLORIDE 1.5 G/1.58G
20-40 POWDER, FOR SOLUTION ORAL
Status: DISCONTINUED | OUTPATIENT
Start: 2017-03-12 | End: 2017-03-19 | Stop reason: HOSPADM

## 2017-03-12 RX ORDER — MAGNESIUM SULFATE HEPTAHYDRATE 40 MG/ML
4 INJECTION, SOLUTION INTRAVENOUS EVERY 4 HOURS PRN
Status: DISCONTINUED | OUTPATIENT
Start: 2017-03-12 | End: 2017-03-19 | Stop reason: HOSPADM

## 2017-03-12 RX ORDER — POTASSIUM CHLORIDE 29.8 MG/ML
20 INJECTION INTRAVENOUS
Status: DISCONTINUED | OUTPATIENT
Start: 2017-03-12 | End: 2017-03-19 | Stop reason: HOSPADM

## 2017-03-12 RX ORDER — POTASSIUM CHLORIDE 750 MG/1
20-40 TABLET, EXTENDED RELEASE ORAL
Status: DISCONTINUED | OUTPATIENT
Start: 2017-03-12 | End: 2017-03-19 | Stop reason: HOSPADM

## 2017-03-12 RX ORDER — POTASSIUM CHLORIDE 7.45 MG/ML
10 INJECTION INTRAVENOUS
Status: DISCONTINUED | OUTPATIENT
Start: 2017-03-12 | End: 2017-03-19 | Stop reason: HOSPADM

## 2017-03-12 RX ADMIN — METOPROLOL TARTRATE 50 MG: 50 TABLET, FILM COATED ORAL at 20:46

## 2017-03-12 RX ADMIN — METOPROLOL TARTRATE 50 MG: 50 TABLET, FILM COATED ORAL at 08:11

## 2017-03-12 RX ADMIN — METFORMIN HYDROCHLORIDE 500 MG: 500 TABLET ORAL at 18:14

## 2017-03-12 RX ADMIN — INSULIN ASPART 1 UNITS: 100 INJECTION, SOLUTION INTRAVENOUS; SUBCUTANEOUS at 08:02

## 2017-03-12 RX ADMIN — FUROSEMIDE 20 MG: 20 TABLET ORAL at 16:00

## 2017-03-12 RX ADMIN — CEFTRIAXONE 2 G: 2 INJECTION, POWDER, FOR SOLUTION INTRAMUSCULAR; INTRAVENOUS at 20:38

## 2017-03-12 RX ADMIN — RANITIDINE HYDROCHLORIDE 150 MG: 150 TABLET, FILM COATED ORAL at 08:11

## 2017-03-12 RX ADMIN — METFORMIN HYDROCHLORIDE 500 MG: 500 TABLET ORAL at 08:11

## 2017-03-12 RX ADMIN — ASPIRIN 325 MG ORAL TABLET 325 MG: 325 PILL ORAL at 08:11

## 2017-03-12 RX ADMIN — OXYCODONE HYDROCHLORIDE AND ACETAMINOPHEN 500 MG: 500 TABLET ORAL at 08:11

## 2017-03-12 RX ADMIN — TAMSULOSIN HYDROCHLORIDE 0.8 MG: 0.4 CAPSULE ORAL at 20:45

## 2017-03-12 RX ADMIN — SODIUM CHLORIDE, PRESERVATIVE FREE 5 ML: 5 INJECTION INTRAVENOUS at 21:21

## 2017-03-12 RX ADMIN — MELATONIN TAB 3 MG 3 MG: 3 TAB at 23:33

## 2017-03-12 RX ADMIN — SODIUM CHLORIDE, PRESERVATIVE FREE 5 ML: 5 INJECTION INTRAVENOUS at 20:42

## 2017-03-12 RX ADMIN — INSULIN GLARGINE 20 UNITS: 100 INJECTION, SOLUTION SUBCUTANEOUS at 08:02

## 2017-03-12 RX ADMIN — POTASSIUM CHLORIDE 40 MEQ: 750 TABLET, FILM COATED, EXTENDED RELEASE ORAL at 23:30

## 2017-03-12 RX ADMIN — SODIUM CHLORIDE, PRESERVATIVE FREE 5 ML: 5 INJECTION INTRAVENOUS at 20:41

## 2017-03-12 RX ADMIN — VITAMIN D, TAB 1000IU (100/BT) 1000 UNITS: 25 TAB at 08:11

## 2017-03-12 RX ADMIN — FUROSEMIDE 40 MG: 40 TABLET ORAL at 08:11

## 2017-03-12 RX ADMIN — MIRTAZAPINE 15 MG: 7.5 TABLET, FILM COATED ORAL at 20:45

## 2017-03-12 RX ADMIN — INSULIN ASPART 1 UNITS: 100 INJECTION, SOLUTION INTRAVENOUS; SUBCUTANEOUS at 18:14

## 2017-03-12 RX ADMIN — SODIUM CHLORIDE, PRESERVATIVE FREE 10 ML: 5 INJECTION INTRAVENOUS at 09:23

## 2017-03-12 RX ADMIN — ATORVASTATIN CALCIUM 40 MG: 40 TABLET, FILM COATED ORAL at 08:15

## 2017-03-12 RX ADMIN — CEFTRIAXONE 2 G: 2 INJECTION, POWDER, FOR SOLUTION INTRAMUSCULAR; INTRAVENOUS at 07:58

## 2017-03-12 RX ADMIN — LISINOPRIL 10 MG: 10 TABLET ORAL at 08:11

## 2017-03-12 RX ADMIN — CLOPIDOGREL BISULFATE 75 MG: 75 TABLET, FILM COATED ORAL at 08:11

## 2017-03-12 RX ADMIN — RANITIDINE HYDROCHLORIDE 150 MG: 150 TABLET, FILM COATED ORAL at 20:46

## 2017-03-12 NOTE — PLAN OF CARE
Problem: Goal/Outcome  Goal: Goal Outcome Summary  Outcome: Improving  FOCUS/GOAL  Medical management     ASSESSMENT, INTERVENTIONS AND CONTINUING PLAN FOR GOAL:  Pt denies pain and SOB. Weaning off oxygen today successfully, O2 sats >94% on RA when checked both while awake and sleeping.   Continent of urine, used urinal independently.  Required min A of 1 for turning side to side q 2hrs to prevent further skin breakdown.   Will continue to monitor O2 sat at night.

## 2017-03-12 NOTE — PROGRESS NOTES
Chart reviewed   No acute issues  On room air now  Continue lasix BID   Check BMP and BNP in AM    Juan Galindo MD   Hospitalist

## 2017-03-12 NOTE — PLAN OF CARE
Problem: Goal/Outcome  Goal: Goal Outcome Summary  Outcome: Therapy, progress toward functional goals as expected  Pt is alert and oriented x 3, no c/o pain or discomfort. He is in a process of weaning off oxygen, see vs flow sheet for details. Double lumen PICC line in the right upper arm is clean, dry, and intact, it is hep locked. He has mepilex dressing on coccyx, it is clean, dry and intact. Back incision is open to air, no sign of infection.We will continue to monitor oxygenation  status while assisting with activity of daily livings.

## 2017-03-12 NOTE — PLAN OF CARE
Problem: Goal/Outcome  Goal: Goal Outcome Summary  Outcome: No Change  (See care Rounds). Turned and repositioned q2h. Liko lift. Incision to back is ALISHA. Sleeping between cares. Using call light appropriately. Able to make his needs known.       Blind right eye

## 2017-03-12 NOTE — PLAN OF CARE
Problem: Goal/Outcome  Goal: Goal Outcome Summary  OT: Pt. Demo increased I with morning ADL routine: s/u for seated g/h at sink. but still demo fatigue requiring rest breaks throughout. Continued endurance and balance exercises completed.

## 2017-03-12 NOTE — PLAN OF CARE
Problem: Goal/Outcome  Goal: Goal Outcome Summary  Outcome: No Change  Alert and oriented to self, place and time, ind with voiding in the urinal and staff empties it, o2 sats 94% RA, assist of 2 to boost up in bed, , slept most of the night, will continue POC

## 2017-03-12 NOTE — PLAN OF CARE
Problem: Goal/Outcome  Goal: Goal Outcome Summary  PT- pt expressed increased fatigue this morning. Pt denied time change was a factor. Frequent rest breaks taken d/t increased fatigue.

## 2017-03-13 LAB
ALBUMIN SERPL-MCNC: 2.1 G/DL (ref 3.4–5)
ALP SERPL-CCNC: 163 U/L (ref 40–150)
ALT SERPL W P-5'-P-CCNC: 23 U/L (ref 0–70)
ANION GAP SERPL CALCULATED.3IONS-SCNC: 10 MMOL/L (ref 3–14)
AST SERPL W P-5'-P-CCNC: 12 U/L (ref 0–45)
BILIRUB DIRECT SERPL-MCNC: 0.1 MG/DL (ref 0–0.2)
BILIRUB SERPL-MCNC: 0.5 MG/DL (ref 0.2–1.3)
BUN SERPL-MCNC: 14 MG/DL (ref 7–30)
CALCIUM SERPL-MCNC: 8.1 MG/DL (ref 8.5–10.1)
CHLORIDE SERPL-SCNC: 109 MMOL/L (ref 94–109)
CO2 SERPL-SCNC: 24 MMOL/L (ref 20–32)
CREAT SERPL-MCNC: 0.57 MG/DL (ref 0.66–1.25)
CRP SERPL-MCNC: 74.7 MG/L (ref 0–8)
ERYTHROCYTE [DISTWIDTH] IN BLOOD BY AUTOMATED COUNT: 17 % (ref 10–15)
GFR SERPL CREATININE-BSD FRML MDRD: ABNORMAL ML/MIN/1.7M2
GLUCOSE BLDC GLUCOMTR-MCNC: 147 MG/DL (ref 70–99)
GLUCOSE BLDC GLUCOMTR-MCNC: 151 MG/DL (ref 70–99)
GLUCOSE BLDC GLUCOMTR-MCNC: 158 MG/DL (ref 70–99)
GLUCOSE BLDC GLUCOMTR-MCNC: 193 MG/DL (ref 70–99)
GLUCOSE SERPL-MCNC: 156 MG/DL (ref 70–99)
HCT VFR BLD AUTO: 28.7 % (ref 40–53)
HGB BLD-MCNC: 8.7 G/DL (ref 13.3–17.7)
LACTATE BLD-SCNC: 0.8 MMOL/L (ref 0.7–2.1)
MAGNESIUM SERPL-MCNC: 2.2 MG/DL (ref 1.6–2.3)
MCH RBC QN AUTO: 27.1 PG (ref 26.5–33)
MCHC RBC AUTO-ENTMCNC: 30.3 G/DL (ref 31.5–36.5)
MCV RBC AUTO: 89 FL (ref 78–100)
NT-PROBNP SERPL-MCNC: 7464 PG/ML (ref 0–900)
PLATELET # BLD AUTO: 180 10E9/L (ref 150–450)
POTASSIUM SERPL-SCNC: 3.5 MMOL/L (ref 3.4–5.3)
PROT SERPL-MCNC: 5.4 G/DL (ref 6.8–8.8)
RBC # BLD AUTO: 3.21 10E12/L (ref 4.4–5.9)
SODIUM SERPL-SCNC: 143 MMOL/L (ref 133–144)
TROPONIN I SERPL-MCNC: 0.03 UG/L (ref 0–0.04)
WBC # BLD AUTO: 5.4 10E9/L (ref 4–11)

## 2017-03-13 PROCEDURE — 86140 C-REACTIVE PROTEIN: CPT | Performed by: INTERNAL MEDICINE

## 2017-03-13 PROCEDURE — A9270 NON-COVERED ITEM OR SERVICE: HCPCS | Mod: GY | Performed by: INTERNAL MEDICINE

## 2017-03-13 PROCEDURE — 97110 THERAPEUTIC EXERCISES: CPT | Mod: GP

## 2017-03-13 PROCEDURE — 25000132 ZZH RX MED GY IP 250 OP 250 PS 637: Mod: GY | Performed by: INTERNAL MEDICINE

## 2017-03-13 PROCEDURE — 83735 ASSAY OF MAGNESIUM: CPT | Performed by: INTERNAL MEDICINE

## 2017-03-13 PROCEDURE — 40000193 ZZH STATISTIC PT WARD VISIT

## 2017-03-13 PROCEDURE — 84484 ASSAY OF TROPONIN QUANT: CPT | Performed by: INTERNAL MEDICINE

## 2017-03-13 PROCEDURE — 12800006 ZZH R&B REHAB

## 2017-03-13 PROCEDURE — 97535 SELF CARE MNGMENT TRAINING: CPT | Mod: GO

## 2017-03-13 PROCEDURE — 83605 ASSAY OF LACTIC ACID: CPT | Performed by: INTERNAL MEDICINE

## 2017-03-13 PROCEDURE — 40000133 ZZH STATISTIC OT WARD VISIT

## 2017-03-13 PROCEDURE — 99232 SBSQ HOSP IP/OBS MODERATE 35: CPT | Performed by: INTERNAL MEDICINE

## 2017-03-13 PROCEDURE — 99207 ZZC CDG-MDM COMPONENT: MEETS MODERATE - DOWN CODED: CPT | Performed by: INTERNAL MEDICINE

## 2017-03-13 PROCEDURE — 85027 COMPLETE CBC AUTOMATED: CPT | Performed by: INTERNAL MEDICINE

## 2017-03-13 PROCEDURE — A9270 NON-COVERED ITEM OR SERVICE: HCPCS | Mod: GY | Performed by: PHYSICAL MEDICINE & REHABILITATION

## 2017-03-13 PROCEDURE — 80048 BASIC METABOLIC PNL TOTAL CA: CPT | Performed by: INTERNAL MEDICINE

## 2017-03-13 PROCEDURE — 25000125 ZZHC RX 250: Performed by: PHYSICAL MEDICINE & REHABILITATION

## 2017-03-13 PROCEDURE — 25000128 H RX IP 250 OP 636: Performed by: INTERNAL MEDICINE

## 2017-03-13 PROCEDURE — 83880 ASSAY OF NATRIURETIC PEPTIDE: CPT | Performed by: INTERNAL MEDICINE

## 2017-03-13 PROCEDURE — 36592 COLLECT BLOOD FROM PICC: CPT | Performed by: INTERNAL MEDICINE

## 2017-03-13 PROCEDURE — 97530 THERAPEUTIC ACTIVITIES: CPT | Mod: GP

## 2017-03-13 PROCEDURE — 00000146 ZZHCL STATISTIC GLUCOSE BY METER IP

## 2017-03-13 PROCEDURE — 80076 HEPATIC FUNCTION PANEL: CPT | Performed by: INTERNAL MEDICINE

## 2017-03-13 PROCEDURE — 25000132 ZZH RX MED GY IP 250 OP 250 PS 637: Mod: GY | Performed by: PHYSICAL MEDICINE & REHABILITATION

## 2017-03-13 RX ORDER — MAGNESIUM SULFATE HEPTAHYDRATE 40 MG/ML
4 INJECTION, SOLUTION INTRAVENOUS EVERY 4 HOURS PRN
Status: DISCONTINUED | OUTPATIENT
Start: 2017-03-13 | End: 2017-03-13

## 2017-03-13 RX ADMIN — METOPROLOL TARTRATE 50 MG: 50 TABLET, FILM COATED ORAL at 08:20

## 2017-03-13 RX ADMIN — TAMSULOSIN HYDROCHLORIDE 0.8 MG: 0.4 CAPSULE ORAL at 20:09

## 2017-03-13 RX ADMIN — MIRTAZAPINE 15 MG: 7.5 TABLET, FILM COATED ORAL at 20:09

## 2017-03-13 RX ADMIN — ASPIRIN 325 MG ORAL TABLET 325 MG: 325 PILL ORAL at 08:18

## 2017-03-13 RX ADMIN — MAGNESIUM SULFATE IN WATER 4 G: 40 INJECTION, SOLUTION INTRAVENOUS at 00:18

## 2017-03-13 RX ADMIN — POTASSIUM CHLORIDE 20 MEQ: 750 TABLET, FILM COATED, EXTENDED RELEASE ORAL at 01:35

## 2017-03-13 RX ADMIN — INSULIN ASPART 1 UNITS: 100 INJECTION, SOLUTION INTRAVENOUS; SUBCUTANEOUS at 08:23

## 2017-03-13 RX ADMIN — FUROSEMIDE 20 MG: 20 TABLET ORAL at 16:28

## 2017-03-13 RX ADMIN — CEFTRIAXONE 2 G: 2 INJECTION, POWDER, FOR SOLUTION INTRAMUSCULAR; INTRAVENOUS at 08:22

## 2017-03-13 RX ADMIN — INSULIN ASPART 2 UNITS: 100 INJECTION, SOLUTION INTRAVENOUS; SUBCUTANEOUS at 12:05

## 2017-03-13 RX ADMIN — POTASSIUM CHLORIDE 20 MEQ: 750 TABLET, FILM COATED, EXTENDED RELEASE ORAL at 16:28

## 2017-03-13 RX ADMIN — LISINOPRIL 10 MG: 10 TABLET ORAL at 08:19

## 2017-03-13 RX ADMIN — ATORVASTATIN CALCIUM 40 MG: 40 TABLET, FILM COATED ORAL at 08:20

## 2017-03-13 RX ADMIN — CEFTRIAXONE 2 G: 2 INJECTION, POWDER, FOR SOLUTION INTRAMUSCULAR; INTRAVENOUS at 20:11

## 2017-03-13 RX ADMIN — METFORMIN HYDROCHLORIDE 500 MG: 500 TABLET ORAL at 18:18

## 2017-03-13 RX ADMIN — INSULIN GLARGINE 20 UNITS: 100 INJECTION, SOLUTION SUBCUTANEOUS at 08:24

## 2017-03-13 RX ADMIN — FUROSEMIDE 40 MG: 40 TABLET ORAL at 08:23

## 2017-03-13 RX ADMIN — VITAMIN D, TAB 1000IU (100/BT) 1000 UNITS: 25 TAB at 08:21

## 2017-03-13 RX ADMIN — INSULIN ASPART 1 UNITS: 100 INJECTION, SOLUTION INTRAVENOUS; SUBCUTANEOUS at 18:18

## 2017-03-13 RX ADMIN — CLOPIDOGREL BISULFATE 75 MG: 75 TABLET, FILM COATED ORAL at 08:21

## 2017-03-13 RX ADMIN — SODIUM CHLORIDE, PRESERVATIVE FREE 5 ML: 5 INJECTION INTRAVENOUS at 07:49

## 2017-03-13 RX ADMIN — SODIUM CHLORIDE, PRESERVATIVE FREE 10 ML: 5 INJECTION INTRAVENOUS at 20:11

## 2017-03-13 RX ADMIN — RANITIDINE HYDROCHLORIDE 150 MG: 150 TABLET, FILM COATED ORAL at 20:09

## 2017-03-13 RX ADMIN — METOPROLOL TARTRATE 50 MG: 50 TABLET, FILM COATED ORAL at 20:09

## 2017-03-13 RX ADMIN — OXYCODONE HYDROCHLORIDE AND ACETAMINOPHEN 500 MG: 500 TABLET ORAL at 08:17

## 2017-03-13 RX ADMIN — METFORMIN HYDROCHLORIDE 500 MG: 500 TABLET ORAL at 08:18

## 2017-03-13 RX ADMIN — RANITIDINE HYDROCHLORIDE 150 MG: 150 TABLET, FILM COATED ORAL at 08:19

## 2017-03-13 NOTE — PROGRESS NOTES
"[facility], [place]   Internal Medicine Daily Note           Interval History/Events     Overnight events reviewed  Episodes of tachycardia last night and this morning   Denies chest pain or palpitations  No fever or chills  Denies lightheadedness          Intake/Output Summary (Last 24 hours) at 03/13/17 0948  Last data filed at 03/13/17 0520   Gross per 24 hour   Intake              220 ml   Output              800 ml   Net             -580 ml       Review of Systems        4 point ROS including Respiratory, CV, GI and , other than that noted above is negative      Medications   I have reviewed current medications  in the \"current medication\" section of Epic.  Relevant changes include:     Physical Exam   General:       Vital signs:   Blood pressure 128/73, pulse 94, temperature 92.6  F (33.7  C), temperature source Oral, resp. rate 16, height 1.829 m (6'), weight 89.8 kg (198 lb), SpO2 95 %.  HEENT: No icterus, no pallor  Cardiovascular: S1, S2 normal.  Respiratory:   Good air entry on both sided , normal respiratory effort  GI/Abdomen: Soft, NT, BS+  Neurology: Alert, awake, and oriented. No tremors.   Extremities: No pretibial edema.   Skin: Callous on the right lateral sole.      Laboratory and Imaging Studies     I have reviewed  laboratory and imaging studies in the Epic. Pertinent findings are as below:    BMP    Recent Labs  Lab 03/13/17  0758 03/12/17  2123 03/10/17  0724 03/09/17  1130    142 143 142   POTASSIUM 3.5 3.0* 4.0 4.4   CHLORIDE 109 106 109 109   JUSTINO 8.1* 7.9* 8.5 8.3*   CO2 24 26 25 25   BUN 14 14 14 14   CR 0.57* 0.57* 0.61* 0.61*   * 194* 167* 232*     CBC    Recent Labs  Lab 03/13/17  0758 03/12/17  2123 03/10/17  0724   WBC 5.4 5.9 9.6   RBC 3.21* 3.13* 3.78*   HGB 8.7* 8.5* 10.2*   HCT 28.7* 28.0* 34.6*   MCV 89 90 92   MCH 27.1 27.2 27.0   MCHC 30.3* 30.4* 29.5*   RDW 17.0* 17.2* 17.2*    175 205     INRNo lab results found in last 7 " days.  LFTs    Recent Labs  Lab 03/13/17  0758 03/10/17  0724   ALKPHOS 163* 230*   AST 12 13   ALT 23 35   BILITOTAL 0.5 0.5   PROTTOTAL 5.4* 6.1*   ALBUMIN 2.1* 2.5*      PANCNo lab results found in last 7 days.        Impression/Plan      Tad Manning is a 76-year-old  gentleman transferred from the [facility] after T7-T12 posterior spinal segmental instrumentation and T9-T10 interbody fusion and posterior arthrodesis T70-T12 for a spinal abscess with osteomyelitis and diskitis.     # Acute systolic CHF: Started on Furosemide 20 mg daily 2/26  Echo on 02/24 showed decreased LVEF and severe global hypokinesis.Lisinopril was started and then titrated up to 5 mg daily 2/28/2017  - Increased Lisinopril to 10 mg daily on 3/6/17  - on Furosemide 20 mg daily And Metoprolol 50 mg BID     - 3/9: in view of increased  short ness of breath and  chest x-ray showed pulmonary edema - gave an extra dose lasix 20 mg and increased his daily dose of lasix to 40 mg  3/10 -added additional 20 mg of lasix at 4 PM     3/13  Renal functions stable   BNP down trending from 09627---> 7464   off oxygen   Episodes of tachy cardia last night and this morning   Will continue the diuretics - if he is getting more frequent tachycardia episodes - will reduce lasix to 40 mg daily.    - FOllow up with [facility] cardiology in one month after discharge with repeat Echo to assess EF and rate control.       # Tachy w PAC's and fusion comlex on EKG 2/22: Atrial flutter with variable block 2/23 Cardiology was consulted. Atenolol was changed to Metoprolol bid 2/24 with improvement. Metoprolol was increased to 50 mg bid 2/25     An episode do tachycardia last night ( 3/12) which resolved spontaneously   Seen by house officer - electrolytes checked - low K and Mg - replaced   Episode of tachy this morning which resolved after taking morning metoprolol  Asymptomatic     # T9-T10 diskitis, osteomyelitis  with epidural abscess due to Streptococcus mitis:   Continue ceftriaxone 2 grams IV every 12 hours for 6 weeks through 03/24/2017.   The patient should have a CBC, CRP, creatinine and LFTs done weekly.   CRP 19.9 on 03/06 and improving  Followup with Infectious Disease, Dr. Gillian Max on 03/23/2017.    # Diabetes mellitus: Continue the Glargine at night along with Correctional Insulin.   Glargine was increased to  20 units 2/19, and then decreased to 18 Units on 03/01 Metformin (home medication) was started on 02/20  Blood sugar controlled.   - Continue Basal, Correctional, and OHA regimen  - Blood sugar before meals, and bed time.     Recent Labs  Lab 03/13/17  0758 03/12/17  2203 03/12/17  2123 03/12/17  1715 03/12/17  1200 03/12/17  0800 03/11/17  2104 03/11/17  1711  03/10/17  0724  03/09/17  1130   *  --  194*  --   --   --   --   --   --  167*  --  232*   BGM  --  175*  --  156* 132* 165* 195* 130*  < >  --   < >  --    < > = values in this interval not displayed.  # Abnormal liver chemistry: Elevated ALP with normal bilirubin, and transaminases. Denies any abdominal pain  Alkp - trending down ( 305-->230 )   # Orthostatic Dizziness and Tachycardia: Worse on tilt table : appears resolved   # PVD: S/P B/L  lower extremity stents  On Aspirin, and Clopidogrel (started 2/20)   Stable   # GI Prophylaxis: Ranitidine for GI ppx- 2/27   # CML in remission:  last chemo was 09/2015. Follow up with Oncology.   # CAD: Continue Aspirin 325 mg daily, Atorvastatin 40 mg daily, Metoprolol.   # Spinal cord compression with lower extremity weakness: plan per Primary team   # insomnia : using PRN Seroquel, will change to Melatonin 3 mg at bed time.            Juan Galindo MD  Hospitalist ( Internal medicine)  Pager: [phone]

## 2017-03-13 NOTE — PLAN OF CARE
Problem: Goal/Outcome  Goal: Goal Outcome Summary  Pt HR ~130 irregularly irregular with exercise but asymptomatic throughout session. Focused on standing and BLE strength in // bars. Need to focus on SB transfer setup and possibly squat pivot transfers.

## 2017-03-13 NOTE — PLAN OF CARE
Problem: Goal/Outcome  Goal: Goal Outcome Summary  Outcome: No Change  Patient is here with traumatic spinal cord dysfunction s/p fall alert and oriented to self, place and time. Potassium 3. And mag 1. Last evening, both replaced on night shift. Has a BMP draw this morning. Sats 93 % RA, HR 86, slept most of the night, will continue POC C

## 2017-03-13 NOTE — PROGRESS NOTES
1.0 x 1.0 cm area on right buttocks slightly open and both buttocks very red.  Mepilex applied to right buttocks, barrier cream to left side of buttocks.  WOCN reconsult related to possible new open area.  Patient states he has been sitting up in the chair and felt very sore in this area.  Continue plan of care.

## 2017-03-13 NOTE — PROGRESS NOTES
Cross Cover Note    S: Called regarding tachycardia 120s.    Patient seen an evaluated.    Patient denies any new symptoms. Says feeling fine. No palpitations or LH or dizziness or cough or cp or dyspnea. Feels sob better after lasix for pulm congestion based on prior cxr  .   No fever or chills.   No calf pain or swelling.   No pain anywhere.   No h/o blood clots  Denies anxiety at current.     OBJECTIVE:  /67  Pulse 94  Temp 96.5  F (35.8  C) (Oral)  Resp 16  Ht 1.829 m (6')  Wt 90.1 kg (198 lb 11.2 oz)  SpO2 99%  BMI 26.95 kg/m2    Temp (24hrs), Av.5  F (35.8  C), Min:96.5  F (35.8  C), Max:96.5  F (35.8  C)      General: alert, interactive, NAD  HEENT: AT/NC, PERRLA, Moist MM  Neck: Supple. No JVD  Respi/Chest: Clear BL, diminished at bases.   CVS/Heart: S1S2 regular, tachy    GI/Abd: Soft, non tender, non distended, BS +  MSK/Extremities: Distal pulses 2+.     Neuro: AO x 4  Pleasant.       Labs, imaging, Meds reviewed.     ASSESSMENT:    # Tachycardia, Patient asymptomatic. BP stable. Afebrile. No pain.   Etiology: Unclear.     PLAN:  Stat EKG done: reviewed. Sinus tach 127 w PACs. Except tachycardia, unchanged from prior. Qtc: 458.   Stat Labs ordered including cbc, bmp, troponin, mg.   CXR portable.    Addendum:     Labs, CXR reviewed.   Noted K 3.0, mg 1.5: likely triggered tachycardia.   Trop: 0.03  Tachycardia actually resolved already in 80-90s.     Replete K Mg per protocol - ordered.  Fu K, mg, trop ordered w AM labs.      Consider r/o PE if tachycardia recurs or worsening dyspnea, hypoxia, cp etc.  Ct to monitor.     D/w MD Sienna Chang  3/12/2017    LABS (Last four results)  CMP  Recent Labs  Lab 17  2123 03/10/17  0724 17  1130 17  0629    143 142 144   POTASSIUM 3.0* 4.0 4.4 3.7   CHLORIDE 106 109 109 110*   CO2 26 25 25 26   ANIONGAP 10 9 8 8   * 167* 232* 115*   BUN 14 14 14 11   CR 0.57* 0.61* 0.61* 0.54*   GFRESTIMATED >90Non   GFR Calc >90Non  GFR Calc >90Non  GFR Calc >90Non  GFR Calc   GFRESTBLACK >90African American GFR Calc >90African American GFR Calc >90African American GFR Calc >90African American GFR Calc   JUSTINO 7.9* 8.5 8.3* 7.9*   MAG 1.5*  --   --   --    PROTTOTAL  --  6.1*  --  5.0*   ALBUMIN  --  2.5*  --  2.1*   BILITOTAL  --  0.5  --  0.4   ALKPHOS  --  230*  --  305*   AST  --  13  --  12   ALT  --  35  --  69     CBC  Recent Labs  Lab 03/12/17  2123 03/10/17  0724 03/06/17  0629   WBC 5.9 9.6 4.8   RBC 3.13* 3.78* 3.33*   HGB 8.5* 10.2* 9.0*   HCT 28.0* 34.6* 30.3*   MCV 90 92 91   MCH 27.2 27.0 27.0   MCHC 30.4* 29.5* 29.7*   RDW 17.2* 17.2* 17.7*    205 190       Recent Results (from the past 24 hour(s))   XR Chest Port 1 View    Narrative    CHEST ONE VIEW PORTABLE   3/12/2017 10:16 PM     HISTORY: Pulmonary congestion. Follow-up exam.    COMPARISON: 3/9/2017      Impression    IMPRESSION: Cardiomegaly and postoperative changes of coronary bypass  surgery, unchanged. Increased right pleural effusion and no change in  left pleural effusion and left lower lobe infiltrate. Patchy  infiltrates in the right lower lobe are unchanged. The previous  increase interstitial markings in both lungs and pulmonary vascular  congestion have decreased. PICC line and previous thoracic spinal  surgery are unchanged.    RENA SANCHEZ MD

## 2017-03-13 NOTE — PLAN OF CARE
Problem: Goal/Outcome  Goal: Goal Outcome Summary  Outcome: No Change  FOCUS/GOAL  Medical management     ASSESSMENT, INTERVENTIONS AND CONTINUING PLAN FOR GOAL:  HR elevated at 2040. Pt denied chest pain, SOB, or any pain. Scheduled metoprolol given. DR. Christine was updated. EKG, chest x-ray, labs checked. O2 2L applied. HR <100 since at 2140. Troponin normal. Mg 1.5 and K+ 3. Dr. Christine ordered Mg and K replacement orders. K 40 mEq given. Needs to give K 20 mEq at 0130 per order. Mg 4gm needs to be infused also per order.   Will continue to monitor.

## 2017-03-14 LAB
GLUCOSE BLDC GLUCOMTR-MCNC: 119 MG/DL (ref 70–99)
GLUCOSE BLDC GLUCOMTR-MCNC: 126 MG/DL (ref 70–99)
GLUCOSE BLDC GLUCOMTR-MCNC: 129 MG/DL (ref 70–99)
GLUCOSE BLDC GLUCOMTR-MCNC: 172 MG/DL (ref 70–99)
INTERPRETATION ECG - MUSE: NORMAL
POTASSIUM SERPL-SCNC: 3.5 MMOL/L (ref 3.4–5.3)

## 2017-03-14 PROCEDURE — 25000132 ZZH RX MED GY IP 250 OP 250 PS 637: Mod: GY | Performed by: INTERNAL MEDICINE

## 2017-03-14 PROCEDURE — A9270 NON-COVERED ITEM OR SERVICE: HCPCS | Mod: GY | Performed by: INTERNAL MEDICINE

## 2017-03-14 PROCEDURE — 12800006 ZZH R&B REHAB

## 2017-03-14 PROCEDURE — 00000146 ZZHCL STATISTIC GLUCOSE BY METER IP

## 2017-03-14 PROCEDURE — 97032 APPL MODALITY 1+ESTIM EA 15: CPT | Mod: GP

## 2017-03-14 PROCEDURE — 97110 THERAPEUTIC EXERCISES: CPT | Mod: GO

## 2017-03-14 PROCEDURE — 25000132 ZZH RX MED GY IP 250 OP 250 PS 637: Mod: GY | Performed by: PHYSICAL MEDICINE & REHABILITATION

## 2017-03-14 PROCEDURE — 40000193 ZZH STATISTIC PT WARD VISIT

## 2017-03-14 PROCEDURE — 97535 SELF CARE MNGMENT TRAINING: CPT | Mod: GO

## 2017-03-14 PROCEDURE — 25000128 H RX IP 250 OP 636: Performed by: INTERNAL MEDICINE

## 2017-03-14 PROCEDURE — 40000133 ZZH STATISTIC OT WARD VISIT

## 2017-03-14 PROCEDURE — 36592 COLLECT BLOOD FROM PICC: CPT | Performed by: INTERNAL MEDICINE

## 2017-03-14 PROCEDURE — A9270 NON-COVERED ITEM OR SERVICE: HCPCS | Mod: GY | Performed by: PHYSICAL MEDICINE & REHABILITATION

## 2017-03-14 PROCEDURE — 99211 OFF/OP EST MAY X REQ PHY/QHP: CPT

## 2017-03-14 PROCEDURE — 84132 ASSAY OF SERUM POTASSIUM: CPT | Performed by: INTERNAL MEDICINE

## 2017-03-14 PROCEDURE — 25000125 ZZHC RX 250: Performed by: PHYSICAL MEDICINE & REHABILITATION

## 2017-03-14 PROCEDURE — 99233 SBSQ HOSP IP/OBS HIGH 50: CPT | Performed by: INTERNAL MEDICINE

## 2017-03-14 PROCEDURE — 97110 THERAPEUTIC EXERCISES: CPT | Mod: GP

## 2017-03-14 PROCEDURE — 97530 THERAPEUTIC ACTIVITIES: CPT | Mod: GP

## 2017-03-14 RX ADMIN — METFORMIN HYDROCHLORIDE 500 MG: 500 TABLET ORAL at 17:44

## 2017-03-14 RX ADMIN — CLOPIDOGREL BISULFATE 75 MG: 75 TABLET, FILM COATED ORAL at 08:19

## 2017-03-14 RX ADMIN — FUROSEMIDE 40 MG: 40 TABLET ORAL at 08:19

## 2017-03-14 RX ADMIN — RANITIDINE HYDROCHLORIDE 150 MG: 150 TABLET, FILM COATED ORAL at 19:56

## 2017-03-14 RX ADMIN — SODIUM CHLORIDE, PRESERVATIVE FREE 5 ML: 5 INJECTION INTRAVENOUS at 19:55

## 2017-03-14 RX ADMIN — RANITIDINE HYDROCHLORIDE 150 MG: 150 TABLET, FILM COATED ORAL at 08:19

## 2017-03-14 RX ADMIN — CEFTRIAXONE 2 G: 2 INJECTION, POWDER, FOR SOLUTION INTRAMUSCULAR; INTRAVENOUS at 08:00

## 2017-03-14 RX ADMIN — INSULIN ASPART 1 UNITS: 100 INJECTION, SOLUTION INTRAVENOUS; SUBCUTANEOUS at 12:10

## 2017-03-14 RX ADMIN — INSULIN GLARGINE 20 UNITS: 100 INJECTION, SOLUTION SUBCUTANEOUS at 08:20

## 2017-03-14 RX ADMIN — POTASSIUM CHLORIDE 20 MEQ: 750 TABLET, FILM COATED, EXTENDED RELEASE ORAL at 12:09

## 2017-03-14 RX ADMIN — METOPROLOL TARTRATE 50 MG: 50 TABLET, FILM COATED ORAL at 08:19

## 2017-03-14 RX ADMIN — SODIUM CHLORIDE, PRESERVATIVE FREE 5 ML: 5 INJECTION INTRAVENOUS at 07:01

## 2017-03-14 RX ADMIN — OXYCODONE HYDROCHLORIDE AND ACETAMINOPHEN 500 MG: 500 TABLET ORAL at 08:19

## 2017-03-14 RX ADMIN — MELATONIN TAB 3 MG 3 MG: 3 TAB at 00:07

## 2017-03-14 RX ADMIN — ATORVASTATIN CALCIUM 40 MG: 40 TABLET, FILM COATED ORAL at 08:19

## 2017-03-14 RX ADMIN — TAMSULOSIN HYDROCHLORIDE 0.8 MG: 0.4 CAPSULE ORAL at 19:56

## 2017-03-14 RX ADMIN — MIRTAZAPINE 15 MG: 7.5 TABLET, FILM COATED ORAL at 19:56

## 2017-03-14 RX ADMIN — ASPIRIN 325 MG ORAL TABLET 325 MG: 325 PILL ORAL at 08:19

## 2017-03-14 RX ADMIN — VITAMIN D, TAB 1000IU (100/BT) 1000 UNITS: 25 TAB at 08:19

## 2017-03-14 RX ADMIN — METOPROLOL TARTRATE 50 MG: 50 TABLET, FILM COATED ORAL at 19:56

## 2017-03-14 RX ADMIN — METFORMIN HYDROCHLORIDE 500 MG: 500 TABLET ORAL at 08:18

## 2017-03-14 RX ADMIN — LISINOPRIL 10 MG: 10 TABLET ORAL at 08:19

## 2017-03-14 RX ADMIN — CEFTRIAXONE 2 G: 2 INJECTION, POWDER, FOR SOLUTION INTRAMUSCULAR; INTRAVENOUS at 19:55

## 2017-03-14 NOTE — PLAN OF CARE
Problem: Goal/Outcome  Goal: Goal Outcome Summary  Outcome: No Change  Alert and oriented to self, place and time, c/o insomnia, melatonin given with good effect. Assist of 2 to boost up in bed. o2 sats 91% RA, HR 98, slept most of the night,will continue POC

## 2017-03-14 NOTE — PLAN OF CARE
Problem: Goal/Outcome  Goal: Goal Outcome Summary  Outcome: No Change  Patient is A&O4, able to make needs known, using call light appropriately. Patient was up with an assist of 1 to pivot to the w/c. Manages clothing and napoleon cares with assist of 1. Bowel sounds present, last BM 03/12 per patient report. voiding spontaneously in the urinal, independent with set-up, assist 1 to empty. No complaint of dizziness, chest pain or SOB. Pulse elevated 130 this shift, K+ replaced, re-check ordered for am,  updated on HR, all other vitals stable. Dressing to open area on bottom changed. Tolerating regular diet, no Nausea. Home eval set-up for tomorrow. Continue POC.

## 2017-03-14 NOTE — PROGRESS NOTES
Patient seen and examined     S- doing well, says mild dizziness on waking and getting out of bed. Denies CP/Palpitaions  Had high HR but denies any other associated symptoms.      4 point ROS done and neg unless mentioned     O-   /67 (BP Location: Left arm)  Pulse 129  Temp 96.4  F (35.8  C) (Oral)  Resp 20  Ht 1.829 m (6')  Wt 90.2 kg (198 lb 12.8 oz)  SpO2 95%  BMI 26.96 kg/m2   Bedside HRapprox 70   Gen- pleasant elderly laying on bed  HEENT- NAD, ZENAIDA  Neck- supple, no JVD elevation, no thyromegaly  CVS- I+II+ no m/r/g  RS- CTAB  Abdo- soft, no tenderness . No g/r/r   Ext- edema    Vitals:    03/12/17 0644 03/13/17 0520 03/14/17 0547   Weight: 90.1 kg (198 lb 11.2 oz) 89.8 kg (198 lb) 90.2 kg (198 lb 12.8 oz)     LABS:   BMP  Recent Labs  Lab 03/14/17  0703 03/13/17  0758 03/12/17 2123 03/10/17  0724 03/09/17  1130   NA  --  143 142 143 142   POTASSIUM 3.5 3.5 3.0* 4.0 4.4   CHLORIDE  --  109 106 109 109   JUSTINO  --  8.1* 7.9* 8.5 8.3*   CO2  --  24 26 25 25   BUN  --  14 14 14 14   CR  --  0.57* 0.57* 0.61* 0.61*   GLC  --  156* 194* 167* 232*     CBC  Recent Labs  Lab 03/13/17  0758 03/12/17 2123 03/10/17  0724   WBC 5.4 5.9 9.6   RBC 3.21* 3.13* 3.78*   HGB 8.7* 8.5* 10.2*   HCT 28.7* 28.0* 34.6*   MCV 89 90 92   MCH 27.1 27.2 27.0   MCHC 30.3* 30.4* 29.5*   RDW 17.0* 17.2* 17.2*    175 205     INRNo lab results found in last 7 days.  LFTs  Recent Labs  Lab 03/13/17  0758 03/10/17  0724   ALKPHOS 163* 230*   AST 12 13   ALT 23 35   BILITOTAL 0.5 0.5   PROTTOTAL 5.4* 6.1*   ALBUMIN 2.1* 2.5*      A/P:  Tad Manning is a 76-year-old  gentleman transferred from the [facility] after T7-T12 posterior spinal segmental instrumentation and T9-T10 interbody fusion and posterior arthrodesis T70-T12 for a spinal abscess with osteomyelitis and diskitis.   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   The patient should have a CBC, CRP, creatinine and LFTs done weekly.   CRP 19.9 on 03/06 and improving  Followup with Infectious Disease, Dr. Gillian Max on 03/23/2017.    # DM:     Recent Labs  Lab 03/14/17  0757 03/13/17  2130 03/13/17  1724 03/13/17  1203 03/13/17  0816 03/13/17  0758 03/12/17  2203 03/12/17  2123  03/10/17  0724  03/09/17  1130   GLC  --   --   --   --   --  156*  --  194*  --  167*  --  232*   * 158* 151* 193* 147*  --  175*  --   < >  --   < >  --    < > = values in this interval not displayed.  Ct Lantus 20U , Metformin 500mg bid , Correction scale     # Acute systolic CHF (Echo on 02/24 showed decreased LVEF and severe global hypokinesis) :  Decrease Lasix to 40 mg (d/c afternoon dose), monitor orthostatic BP    - Cardiology f/u after d/c with reeat echo   #Tachycardia:  Atrial flutter with variable block , electrolytes stable : appreciate Cardiology consult  - Ct Metoprolol 50 mg bid, Lisinopril 2.5 mg 2/26   - Check orthostatic BP and decrease lasix (? Contributory)    peripheral vascular disease, status post bilateral lower extremity stents:   Continue aspirin , Plavix( started 2/20)   # Chronic lymphocytic leukemia in remission, last chemo was 09/2015: Follow up with Oncology.   # Coronary artery disease: Continue aspirin 325 mg daily, Lipitor 40 mg daily, (Changed atenolol to Metoprolol 2/24).   # Spinal cord compression with lower extremity weakness: plan per Primary team      D/W Dr Leroy/ PMR 3/14     Osmin Lange MD (Pager- [phone])   Internal Medicine/ Hospitalist

## 2017-03-14 NOTE — PLAN OF CARE
Problem: Goal/Outcome  Goal: Goal Outcome Summary  Outcome: Improving  FOCUS/GOAL  Bladder management, Wound care management and Medical management     ASSESSMENT, INTERVENTIONS AND CONTINUING PLAN FOR GOAL:  Pt denied any pain, urinal used but spilled on the bed, total cares done, linen changed also. Partial Ortho BP done, and no change (see the f/sheets). Pt left the unit now, for home eval with Therapy, assisted with lower body dressing with threading both legs and pt able to make bridge and pull up his pants. Pt has redness on his buttock, non blanchable, Foam dressing intact. Pt transferred with sliding board but assisted required to pull up both legs to bed. Pt is on IV ABX , linen patent and intact. Pt calls to make his needs known.  Nursing will cont POC.

## 2017-03-14 NOTE — PROGRESS NOTES
[facility]  WOC Nurse Inpatient Skin Assessment     Initial assessment of:   Coccyx  Initial assessment of: foot (right lateral)      Data:   Patient History:     Per MD note(s):  76 year old male with CAD, DM with neuropathy, HTN, PVD with bilateral LE stents on ASA/plvx, Chronic lymphocytic leukemia in remission (last chemo in 2015) who presents after a fall on  and R > L leg weakness and inability to ambulate. His story is inconsistent and somewhat tangential but he clearly articulates that 72 hours prior he was able to ambulation but now he cannot. 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     WOC was consulted to evaluate and treat a coccyx pressure injury.     Moisture Management:  Incontinence Protocol        Current Diet / Nutrition:         Active Diet Order      Combination Diet 7077-5348 Calories: Moderate Consistent CHO (4-6 CHO units/meal); Thin Liquids (water, ice chips, juice, milk, gelatin, ice cream, etc)            Other     Roberto Assessment and sub scores:   Roberto Score  Av.5  Min: 12  Max: 20       Labs:        Recent Labs   Lab Test  17   0758   17   0835   02/10/17   0230   ALBUMIN  2.1*   < >   --    --    --    HGB  8.7*   < >  8.2*   < >  8.3*   INR   --    --   1.22*   --    --    WBC  5.4   < >   --    < >  8.8   A1C   --    --    --    --   6.1*   CRP  74.7*   < >   --    --   140.0*    < > = values in this interval not displayed.       Skin Assessment (location):  Right foot ( plantar aspect 5th metatarsal head)  History:  Nurse noted callous to foot and requesting assessment, patient had been seeing a podiatrist as outpatient for this problem, he has neuropathy  Measurements: O.6 x 0.6cm dry white/brown callous with loose edges, no draininge no erythema, 5th toe also noted to have a 2-3mm flat red blister without and erythema    Skin  Assessment (location):   Sacrum  History:  Incontinence; immobility    Skin: erythema and exfoliating epidermis in the immediate area encirciling buttocks just above hilton cleft    Color: pink    Temperature  warm    Drainage:      Amount: none     Color: pink, easily blanchable    Odor: none    Pain:  minimal, dull    Wound Location:  Right buttock  Wound History: Bilateral buttocks had dry, peeling epidermis last week with two larger areas.  Left buttock resolved.  Right buttock peeled with wound remaining likely due to friction as it is not over a bony prominence.          Measurements (length x width x depth, in cm) 0.4 x 1.4 x 0.1 cm  Wound Base:  100% dermis  Palpation of the wound bed: normal   Periwound skin: intact and dry/scaly  Color: pink  Temperature: normal   Drainage: scant  Description of drainage: serous  Odor: none  Pain:  Pt reports it is tender when he lays on right side too much           Intervention:     Patient's chart evaluated.      Cares performed:    Orders  Written    Supplies  Reviewed    Discussed plan of care with Patient and Nurse          Assessment:   Pre exisiting neuropathic foot callous  Moisture  associated dermatitis to sacrum   Friction wound to right fleshy buttock        Plan:   Nursing to notify the Provider(s) and re-consult the [facility] Nurse if skin deteriorate(s).  Skin care plan for Perinuem: Continue: Daily and as needed :  Cleanse with Mahsa Cleanse and Protect daily and with each episode of incontinence. Apply a thin layer of Crit[employer] paste around rectum- Do Not Apply to Buttocks. Apply Sween 24 to dry, peeling skin on sacrum and buttocks. Apply twice daily.  Plan of care for right buttocks: Cleanse with Mahsa Cleanse and Protect every other day. Coat with Cavalon No Sting swab.  Apply mepilex over open wound.  Remind pt to lift buttocks to reposition, try not to slide.    Feet :Daily  Apply Sween 24 lotion to callous and  dry skin     [facility] Nurse will return: weekly  Face  to face time: 5  minutes

## 2017-03-14 NOTE — PLAN OF CARE
Problem: Goal/Outcome  Goal: Goal Outcome Summary  Skilled set up on :  Pt dependent for proper placement of electrodes on (B) gastroc, ant tib, hamstring and quad for optimum muscle contraction, safe positioning on w/c within frame and cushion for prevention of skin breakdown, feet secured to foot pedals, w/c secured to  w/ Q-straints.  Passive motion assessed to ensure proper positioning.  Determined appropriate FES parameters for each muscle group based off of strong tetanic response of muscle test.     Pt performed 16 minutes of active FES ergometry with min=max stimulation applied to above muscles at 35 rpm with .5 nm resistance.  This PT adjusted e-stim and cycling parameters in real-time to ensure palpable muscle contractions throughout session.  Please see [email or web address] for further details on patient's stimulation parameters and ergometry outcomes.  Patient ID:[id number], PIN: [id number].     Extra time needed for (B) setup.

## 2017-03-14 NOTE — PROGRESS NOTES
PM&R Daily Note:    Some tachycardia though not symptomatic. Not hypotensive and guarded cutting back diuretics with heart failure history but may need to. Has h/o atrial flutter with variable block. Appreciate hospitalist consult support - discussed with Dr. Galindo    Home visit tomorrow to assess function in that setting and help with d/c planning details. Has had longer rehab course and still hoping for a home disposition if reasonably safe.    BG for DM not great, had had mild low but isolated, may need to increase coverage further.      Vitals:    03/13/17 0608 03/13/17 0730 03/13/17 1600 03/13/17 1624   BP: 125/64 128/73 140/78 128/75   BP Location: Left arm Left arm Left arm    Pulse:       Resp: 16  16 16   Temp: 92.6  F (33.7  C)  97.9  F (36.6  C) 98.5  F (36.9  C)   TempSrc: Oral  Oral Oral   SpO2: 95%  98% 99%   Weight:       Height:         Alert, pleasant  No diaphoresis  Heart regular with me though reported irregular during therapy session.  No cough or wheeze      furosemide  20 mg Oral QPM     insulin glargine  20 Units Subcutaneous QAM AC     furosemide  40 mg Oral Daily     lisinopril  10 mg Oral Daily     mirtazapine  15 mg Oral At Bedtime     ranitidine  150 mg Oral BID     metoprolol  50 mg Oral BID     metFORMIN  500 mg Oral BID w/meals     clopidogrel  75 mg Oral Daily     ascorbic acid (VITAMIN C) tablet 500 mg  500 mg Oral Daily     aspirin  325 mg Oral Daily     atorvastatin  40 mg Oral Daily     cefTRIAXone  2 g Intravenous Q12H     cholecalciferol  1,000 Units Oral Daily     tamsulosin  0.8 mg Oral At Bedtime     insulin aspart  1-7 Units Subcutaneous TID AC     insulin aspart  1-5 Units Subcutaneous At Bedtime     heparin lock flush  5-10 mL Intracatheter Q24H

## 2017-03-15 LAB
ALBUMIN SERPL-MCNC: 2.1 G/DL (ref 3.4–5)
ALP SERPL-CCNC: 138 U/L (ref 40–150)
ALT SERPL W P-5'-P-CCNC: 24 U/L (ref 0–70)
ANION GAP SERPL CALCULATED.3IONS-SCNC: 11 MMOL/L (ref 3–14)
AST SERPL W P-5'-P-CCNC: 20 U/L (ref 0–45)
BILIRUB DIRECT SERPL-MCNC: 0.1 MG/DL (ref 0–0.2)
BUN SERPL-MCNC: 18 MG/DL (ref 7–30)
CALCIUM SERPL-MCNC: 8.4 MG/DL (ref 8.5–10.1)
CHLORIDE SERPL-SCNC: 108 MMOL/L (ref 94–109)
CO2 SERPL-SCNC: 24 MMOL/L (ref 20–32)
CREAT SERPL-MCNC: 0.66 MG/DL (ref 0.66–1.25)
CRP SERPL-MCNC: 81 MG/L (ref 0–8)
ERYTHROCYTE [DISTWIDTH] IN BLOOD BY AUTOMATED COUNT: 16.9 % (ref 10–15)
GFR SERPL CREATININE-BSD FRML MDRD: ABNORMAL ML/MIN/1.7M2
GLUCOSE BLDC GLUCOMTR-MCNC: 127 MG/DL (ref 70–99)
GLUCOSE BLDC GLUCOMTR-MCNC: 131 MG/DL (ref 70–99)
GLUCOSE BLDC GLUCOMTR-MCNC: 150 MG/DL (ref 70–99)
GLUCOSE BLDC GLUCOMTR-MCNC: 218 MG/DL (ref 70–99)
GLUCOSE SERPL-MCNC: 146 MG/DL (ref 70–99)
HCT VFR BLD AUTO: 27.7 % (ref 40–53)
HGB BLD-MCNC: 8.1 G/DL (ref 13.3–17.7)
MCH RBC QN AUTO: 26 PG (ref 26.5–33)
MCHC RBC AUTO-ENTMCNC: 29.2 G/DL (ref 31.5–36.5)
MCV RBC AUTO: 89 FL (ref 78–100)
PLATELET # BLD AUTO: 173 10E9/L (ref 150–450)
POTASSIUM SERPL-SCNC: 3.6 MMOL/L (ref 3.4–5.3)
PROT SERPL-MCNC: 5.4 G/DL (ref 6.8–8.8)
RBC # BLD AUTO: 3.12 10E12/L (ref 4.4–5.9)
SODIUM SERPL-SCNC: 143 MMOL/L (ref 133–144)
WBC # BLD AUTO: 5.6 10E9/L (ref 4–11)

## 2017-03-15 PROCEDURE — 86140 C-REACTIVE PROTEIN: CPT | Performed by: INTERNAL MEDICINE

## 2017-03-15 PROCEDURE — A9270 NON-COVERED ITEM OR SERVICE: HCPCS | Mod: GY | Performed by: PHYSICAL MEDICINE & REHABILITATION

## 2017-03-15 PROCEDURE — 80048 BASIC METABOLIC PNL TOTAL CA: CPT | Performed by: INTERNAL MEDICINE

## 2017-03-15 PROCEDURE — 25000125 ZZHC RX 250: Performed by: PHYSICAL MEDICINE & REHABILITATION

## 2017-03-15 PROCEDURE — 85027 COMPLETE CBC AUTOMATED: CPT | Performed by: INTERNAL MEDICINE

## 2017-03-15 PROCEDURE — 82040 ASSAY OF SERUM ALBUMIN: CPT | Performed by: INTERNAL MEDICINE

## 2017-03-15 PROCEDURE — 82248 BILIRUBIN DIRECT: CPT | Performed by: INTERNAL MEDICINE

## 2017-03-15 PROCEDURE — 84460 ALANINE AMINO (ALT) (SGPT): CPT | Performed by: INTERNAL MEDICINE

## 2017-03-15 PROCEDURE — 25000132 ZZH RX MED GY IP 250 OP 250 PS 637: Mod: GY | Performed by: INTERNAL MEDICINE

## 2017-03-15 PROCEDURE — 84450 TRANSFERASE (AST) (SGOT): CPT | Performed by: INTERNAL MEDICINE

## 2017-03-15 PROCEDURE — 36592 COLLECT BLOOD FROM PICC: CPT | Performed by: INTERNAL MEDICINE

## 2017-03-15 PROCEDURE — 12800006 ZZH R&B REHAB

## 2017-03-15 PROCEDURE — 97530 THERAPEUTIC ACTIVITIES: CPT | Mod: GP

## 2017-03-15 PROCEDURE — 25000131 ZZH RX MED GY IP 250 OP 636 PS 637: Mod: GY | Performed by: INTERNAL MEDICINE

## 2017-03-15 PROCEDURE — 84155 ASSAY OF PROTEIN SERUM: CPT | Performed by: INTERNAL MEDICINE

## 2017-03-15 PROCEDURE — 97535 SELF CARE MNGMENT TRAINING: CPT | Mod: GO

## 2017-03-15 PROCEDURE — 25000132 ZZH RX MED GY IP 250 OP 250 PS 637: Mod: GY | Performed by: PHYSICAL MEDICINE & REHABILITATION

## 2017-03-15 PROCEDURE — 99232 SBSQ HOSP IP/OBS MODERATE 35: CPT | Performed by: INTERNAL MEDICINE

## 2017-03-15 PROCEDURE — 84075 ASSAY ALKALINE PHOSPHATASE: CPT | Performed by: INTERNAL MEDICINE

## 2017-03-15 PROCEDURE — 00000146 ZZHCL STATISTIC GLUCOSE BY METER IP

## 2017-03-15 PROCEDURE — 40000193 ZZH STATISTIC PT WARD VISIT

## 2017-03-15 PROCEDURE — A9270 NON-COVERED ITEM OR SERVICE: HCPCS | Mod: GY | Performed by: INTERNAL MEDICINE

## 2017-03-15 PROCEDURE — 97110 THERAPEUTIC EXERCISES: CPT | Mod: GP

## 2017-03-15 PROCEDURE — 25000128 H RX IP 250 OP 636: Performed by: INTERNAL MEDICINE

## 2017-03-15 PROCEDURE — 40000133 ZZH STATISTIC OT WARD VISIT

## 2017-03-15 RX ORDER — POTASSIUM CHLORIDE 750 MG/1
20 TABLET, EXTENDED RELEASE ORAL DAILY
Status: DISCONTINUED | OUTPATIENT
Start: 2017-03-15 | End: 2017-03-19 | Stop reason: HOSPADM

## 2017-03-15 RX ADMIN — POTASSIUM CHLORIDE 20 MEQ: 750 TABLET, EXTENDED RELEASE ORAL at 10:49

## 2017-03-15 RX ADMIN — CEFTRIAXONE 2 G: 2 INJECTION, POWDER, FOR SOLUTION INTRAMUSCULAR; INTRAVENOUS at 20:17

## 2017-03-15 RX ADMIN — CEFTRIAXONE 2 G: 2 INJECTION, POWDER, FOR SOLUTION INTRAMUSCULAR; INTRAVENOUS at 08:50

## 2017-03-15 RX ADMIN — LISINOPRIL 10 MG: 10 TABLET ORAL at 08:55

## 2017-03-15 RX ADMIN — TAMSULOSIN HYDROCHLORIDE 0.8 MG: 0.4 CAPSULE ORAL at 20:11

## 2017-03-15 RX ADMIN — MIRTAZAPINE 15 MG: 7.5 TABLET, FILM COATED ORAL at 20:11

## 2017-03-15 RX ADMIN — SODIUM CHLORIDE, PRESERVATIVE FREE 5 ML: 5 INJECTION INTRAVENOUS at 07:37

## 2017-03-15 RX ADMIN — METFORMIN HYDROCHLORIDE 500 MG: 500 TABLET ORAL at 08:55

## 2017-03-15 RX ADMIN — FUROSEMIDE 40 MG: 40 TABLET ORAL at 08:55

## 2017-03-15 RX ADMIN — RANITIDINE HYDROCHLORIDE 150 MG: 150 TABLET, FILM COATED ORAL at 08:55

## 2017-03-15 RX ADMIN — INSULIN ASPART 1 UNITS: 100 INJECTION, SOLUTION INTRAVENOUS; SUBCUTANEOUS at 06:57

## 2017-03-15 RX ADMIN — INSULIN GLARGINE 20 UNITS: 100 INJECTION, SOLUTION SUBCUTANEOUS at 06:58

## 2017-03-15 RX ADMIN — SODIUM CHLORIDE, PRESERVATIVE FREE 5 ML: 5 INJECTION INTRAVENOUS at 20:10

## 2017-03-15 RX ADMIN — ASPIRIN 325 MG ORAL TABLET 325 MG: 325 PILL ORAL at 08:54

## 2017-03-15 RX ADMIN — OXYCODONE HYDROCHLORIDE AND ACETAMINOPHEN 500 MG: 500 TABLET ORAL at 08:55

## 2017-03-15 RX ADMIN — METOPROLOL TARTRATE 50 MG: 50 TABLET, FILM COATED ORAL at 20:10

## 2017-03-15 RX ADMIN — CLOPIDOGREL BISULFATE 75 MG: 75 TABLET, FILM COATED ORAL at 08:54

## 2017-03-15 RX ADMIN — SODIUM CHLORIDE, PRESERVATIVE FREE 10 ML: 5 INJECTION INTRAVENOUS at 09:44

## 2017-03-15 RX ADMIN — RANITIDINE HYDROCHLORIDE 150 MG: 150 TABLET, FILM COATED ORAL at 20:11

## 2017-03-15 RX ADMIN — INSULIN ASPART 1 UNITS: 100 INJECTION, SOLUTION INTRAVENOUS; SUBCUTANEOUS at 22:02

## 2017-03-15 RX ADMIN — METFORMIN HYDROCHLORIDE 500 MG: 500 TABLET ORAL at 18:51

## 2017-03-15 RX ADMIN — ATORVASTATIN CALCIUM 40 MG: 40 TABLET, FILM COATED ORAL at 08:55

## 2017-03-15 RX ADMIN — METOPROLOL TARTRATE 50 MG: 50 TABLET, FILM COATED ORAL at 08:55

## 2017-03-15 RX ADMIN — VITAMIN D, TAB 1000IU (100/BT) 1000 UNITS: 25 TAB at 08:54

## 2017-03-15 RX ADMIN — MELATONIN TAB 3 MG 3 MG: 3 TAB at 01:04

## 2017-03-15 NOTE — PLAN OF CARE
Problem: Goal/Outcome  Goal: Goal Outcome Summary  Outcome: Improving  FOCUS/GOAL  Medical management     ASSESSMENT, INTERVENTIONS AND CONTINUING PLAN FOR GOAL:  Pt declined pain so far, VSS compared to yesterday, sliding board with transfers with Ax1, required lifting his legs up in bed and cues for safety. On IV ABX, Picc line patent and hep lock. Pt has redness on his buttock, encouraged with turning and repositioning.    Nursing will cont POC           1445: pt is on K+ tab daily his K+ is 3.6, recheck will be tomorrow.

## 2017-03-15 NOTE — PROGRESS NOTES
[facility], [place]   Internal Medicine Daily Note          Assessment and Plan:    Tad Manning is a 76-year-old  gentleman transferred from the [facility] after T7-T12 posterior spinal segmental instrumentation and T9-T10 interbody fusion and posterior arthrodesis T70-T12 for a spinal abscess with osteomyelitis and diskitis.     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   Weekly CBC, CRP and LFT's. Labs on 3/15 with normal LFT's  CRP at 81 ( on 3/13 at 74.7)  Stable WBC and platelet counts   Followup with Infectious Disease, Dr. Gillian Max on 03/23/2017.     # DM Type 2 ( well controlled- A1c on 2/10/2017 @ 6.1)   Continue Lantus 20U , Metformin 500mg bid , Correction scale insulin   Stable sugars      # Acute systolic CHF   Echo on 02/24 showed decreased LVEF and severe global hypokinesis. Evaluated by cardiology   Currently on medical treatment for heart failure with beta blocker ( metoprolol 50 mg BID)  , ACE inhibitor ( Lisinopril 10 mg daily) and diuresis   Given some lightheadedness and ortho stasis, Lasix reduced to 40 mg daily on 3.15 ( from 40 mg am and 20 mg pm). Scheduled potassium supplement at 20 meq initiated on 3/15  Continue with daily weights   Cardiology f/u after d/c with repeat echo   #Tachycardia:    Atrial flutter with variable block , electrolytes stable .CHADSVASC score is 4 (CHF, HTN, Age, DM, vascular disease) and long term AC would be recommended; however risk vs benefit would need to closely evaluated. With history of frequent falls we would be concerned about head injury.  In any case, patient is on aspirin 325 mg  and clopidogrel 75 mg   Issues of anticoagulation to be re discussed with cardiology as outpatient     Peripheral vascular disease, status post bilateral lower  "extremity stents:   Continue aspirin , Plavix( started 2/20)     # Chronic lymphocytic leukemia in remission, last chemo was 09/2015: Follow up with Oncology.   # Coronary artery disease: Continue aspirin 325 mg daily, Lipitor 40 mg daily, (Changed atenolol to Metoprolol 2/24).   # Spinal cord compression with lower extremity weakness: plan per Primary team              Consulting teams: Internal Medicine   Code status: Full   DVT Prophylaxis: Aspirin 325 mg   Gastric prophylaxis: H2 blocker   Diet: Moderate consistent carbohydrate diet   Disposition: to be determined       Patient seen and examined with RN         Interval History:   Progress notes in the last 24 hrs by MDT team has been reviewed.   This is the first time I am meeting with Mr Cervantes. His H&P and progress of events has been reviewed.  Sign out received from dr Lange  Patient was just back after his therapy. He feels well   Denies any fevers or chills   Denies any chest pain/ SOB  Eating and drinking well            Review of Systems:   A comprehensive review of systems was performed and found to be negative except: Those that are outlined in interval history              Medications:   I have reviewed this patient's current medications which are outlined in the \"current medication\" section of EPIC             Physical Exam:   Vitals were reviewed  Temp: 98.8  F (37.1  C) Temp src: Oral BP: (!) 124/100 Pulse: 92 Heart Rate: 90 Resp: 18 SpO2: 96 % O2 Device: None (Room air)    Constitutional:   awake, alert, cooperative, no apparent distress, and appears stated age     Lungs:   No increased work of breathing, good air exchange, clear to auscultation bilaterally, no crackles or wheezing     Cardiovascular:   Irregular heart sounds. Mild tachycardia. No murmurs. No peripheral edema      Abdomen:   No scars, normal bowel sounds, soft, non-distended, non-tender, no masses palpated, no hepatosplenomegally     Musculoskeletal:   no lower extremity " pitting edema present. Back incision has healed well      Neurologic:   Awake, alert, oriented to name, place and time.  Cranial nerves II-XII are grossly intact.             Data:     Most recent labs have been evaluated and relevant labs are outlined below :    BMP  Recent Labs  Lab 03/15/17  0742 03/14/17  0703 03/13/17  0758 03/12/17  2123 03/10/17  0724     --  143 142 143   POTASSIUM 3.6 3.5 3.5 3.0* 4.0   CHLORIDE 108  --  109 106 109   JUSTINO 8.4*  --  8.1* 7.9* 8.5   CO2 24  --  24 26 25   BUN 18  --  14 14 14   CR 0.66  --  0.57* 0.57* 0.61*   *  --  156* 194* 167*     CBC  Recent Labs  Lab 03/15/17  0742 03/13/17  0758 03/12/17  2123 03/10/17  0724   WBC 5.6 5.4 5.9 9.6   RBC 3.12* 3.21* 3.13* 3.78*   HGB 8.1* 8.7* 8.5* 10.2*   HCT 27.7* 28.7* 28.0* 34.6*   MCV 89 89 90 92   MCH 26.0* 27.1 27.2 27.0   MCHC 29.2* 30.3* 30.4* 29.5*   RDW 16.9* 17.0* 17.2* 17.2*    180 175 205     INRNo lab results found in last 7 days.  LFTs  Recent Labs  Lab 03/13/17  0758 03/10/17  0724   ALKPHOS 163* 230*   AST 12 13   ALT 23 35   BILITOTAL 0.5 0.5   PROTTOTAL 5.4* 6.1*   ALBUMIN 2.1* 2.5*      PANCNo lab results found in last 7 days.    Case discussed with  Primary team staff, Dr Leroy on 3/15      Dr NRIANJAN Schaefer MD  Hospitalist ( Internal medicine)  Pager: [phone]

## 2017-03-15 NOTE — PLAN OF CARE
Problem: Goal/Outcome  Goal: Goal Outcome Summary  FOCUS/GOAL  Wound care management     ASSESSMENT, INTERVENTIONS AND CONTINUING PLAN FOR GOAL:  Patient arrived back tot he unit roughly around 1700, patient had dinner with family outside of facility. Painful rash noted on patient bilateral groin, scrotum, and penis, sticky note left for MD to follow up. Mepilex is present on bottom.  Double lumen heparin locked and stable. No s/s insulin given this shift. Continue POC.

## 2017-03-15 NOTE — PLAN OF CARE
Problem: Goal/Outcome  Goal: Goal Outcome Summary  PT: Pt states he did not sleep well last, he says it could possibly be because he is worried about home situation regarding home visit yesterday. Today pt performed SB transfers with cues for set up by no physical A needed and squat pivot transfers with pt assisting with bed rail or w/c arm rest and needing mod A.

## 2017-03-15 NOTE — CARE CONFERENCE
Acute Rehab Care Conference/Team Rounds      Type: Team Rounds    Present: Dr Leroy, Chelsea Mosqueda LICSW, Lexus Saenz OT, Anali Negron PT, Michelle Alfaro SLP, Marlen Man , , Frances Marie Dietician, Norberto BIGGS.      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    Active Medical Co-morbidities/Prognosis: HTN, heart failure, CAD, HTN, DM    Safety: Patient uses call light appropriately, able to communicate his needs. Pivot transfer to and from w/c with assistance of one.    Pain: Painful rash bilateral groin, he needs order to treat. Low back pain yesterday, treated with heat.    Medications, Skin, Tubes/Lines: Plan to d/c early next week, assess for need for MAP, diabetic management, and wound cares teaching. Coccyx and right buttocks wounds covered with foam dressing. Mid back incision open to air. Double lumen PICC right basilic for IV antibiotics.    Swallowing/Nutrition:    Bowel/Bladder: LBM on 3/12, occasional incontinence. Continent of urine. He needs  assistance with incontinence cares.    Psychosocial: Pts goal to return home with continued support from family. Team working towards a safe d/c plan.     ADLs/IADLs: Home visit yesterday, overall limited assess to bathroom, both toilet and shower from w/c level,  limited access to kitchen for even light meal prep, and 400 feet from elevator to his apartment door in which patient unable to self propel at this time due to endurance. Patient continues to require assist with set up, w/c management and positioning with w/c and slide board for all transfers. Patient able to complete FB dressing with use of AE while supine in bed or at EOB with set up assist. Patient able to demonstrate mod I with bed mobility. Patient fatigues quickly with all activity and requires frequent rest breaks throughout all therapy. Patient continues with all overall poor self initiation  and sequencing with routine ADLs and functional transfers.     Mobility: Remains w/c based.  Had progressed to squat pivot transfers; however, after a day not feeling well he regressed to straight sliding board and has not regained this strength.  Demonstrates poor sequencing and initiation of necessary activities for transfer set up, needs step by step instructions each time.  Home visit demonstrated poor w/c access to living spaces.  Recommend d/c to TCU.      Cognition/Language: Continued with lack of insight into deficits, poor sequencing and initiation with routine ADL tasks     Community Re-Entry: w/c based w/ assist    Transportation: Car transfer not a barrier, however, due to B LE weakness and decreased sensation pt is not safe to return to driving.     Decision maker: self    Plan of Care and goals reviewed and updated.    Discharge Plan/Recommendations    Fall Precautions: continue    Overall plan for the patient: had home visit yesterday with several barriers. Space access will require some door frame modification. The bathroom will be particularly limiting and shower will not be effective. Not yet consistent with his transfers. His support is quite limited in terms of personal cares. Really is looking that he'll need longer term wheelchair accessible housing. He does have considerable cardiopulmonary limitations also impact his consistency. There are difficulties with carry over and similar conversations occur between days. Really looking at needing additional recovery time or assisted living facility if that could be secured on the bit shorter time course. Might justify another few weeks acute rehab if home / new shelter could be established otherwise looking at TCU referral sooner.      Utilization Review and Continued Stay Justification    Medical Necessity Criteria:    For any criteria that is not met, please document reason and plan for discharge, transfer, or modification of plan of care to  address.    Requires intensive rehabilitation program to treat functional deficits?: Yes  Requires 3x per week or greater involvement of rehabilitation physician to oversee rehabilitation program?: Yes  Requires rehabilitation nursing interventions?: Yes  Patient is making functional progress?: Yes  There is a potential for additional functional progress? Yes  Patient is participating in therapy 3 hours per day a minimum of 5 days per week or 15 hours per week in 7 day period?:Yes  Has discharge needs that require coordinated discharge planning approach?:Yes      Final Physician Sign off    Statement of Approval: I agree with all the recommendations detailed in this document.      Patient Goals     1. Pt will d/c home/community setting upon completion of therapy goals.  2. Pt and family will be involved in d/c planning and will be made aware of services available to meet pts needs.       OT target date for goal attainment: 03/16/17  OT Frequency: daily  min  OT goal: hygiene/grooming: independent  OT goal: upper body dressing: Independent, including set-up/clothing retrieval, within precautions  OT goal: lower body dressing: Modified independent, using adaptive equipment, including set-up/clothing retrieval  OT goal: upper body bathing: Supervision/stand-by assist, within precautions  OT goal: lower body bathing: Supervision/stand-by assist, with precautions (seated)  OT Goal: transfer: Modified independent, with assistive device  OT goal: toilet transfer/toileting: Modified independent, cleaning and garment management, toilet transfer, within precautions  OT goal: meal preparation: Modified independent, with simple meal preparation, within precautions (Wc based pending progress with amb)  OT goal: cognitive: Patient/caregiver will verbalize understanding of cognitive assessment results/recommendations as needed for safe discharge planning  OT goal 1: Pt will be able to complete ADL and functiopnal mobility  with useopf spinal precautions without cues for following precautions and restrictions.   OT goal 2: Pt will be I with UB HEP to impriove UB strength for functional mobiltiy, transfer, and bed mobility     PT target date for goal attainment: 03/16/17  PT Frequency: daily x 60 minutes  PT goal: bed mobility: Modified independent, Supine to/from sit, Rolling, Bridging, Within precautions  PT goal: transfers: Modified independent, Sit to/from stand, Bed to/from chair, Assistive device, Within precautions  PT goal: gait:  (w/c vs amb TBD)     PT goal: perform aerobic activity with stable cardiovascular response: 15 minutes, NuStep, continuous activity     PT goal 1: Patient will perform car transfer Mod I.   PT Goal 2: Patient will propel wheelchair Mod I x 300' in order to access the community.   PT Goal 3: Patient will be independent with HEP for strengthening and ROM in order to continuing working towards strength in home setting.   PT Goal 4:  (MET 3/10)      Patient Goal:  Pain Management: Goal met   Goal: Wound Management: Wound cares on going process     Patient/Family Goal: Medication Management: Pt needs med assistance     Goal: Skin Integrity: pt has still sore on his buttock      Goal: Reinforcement of self care/ADL: pt still requesting assist with perineal cares  Goal: Dress/Undress: Pt stil needs assist with dressing    Individualized Goal 1: Pt's S.O. will be able to manage ability to manage IV antibiotic thru PICC before discharge.

## 2017-03-15 NOTE — PROGRESS NOTES
"CLINICAL NUTRITION SERVICES - REASSESSMENT NOTE     Nutrition Prescription    RECOMMENDATIONS FOR MDs/PROVIDERS TO ORDER:  None today    Malnutrition Status:    Non-severe malnutrition in the context of acute illness    Recommendations already ordered by Registered Dietitian (RD):  None today    Future/Additional Recommendations:  None today     EVALUATION OF THE PROGRESS TOWARD GOALS   Diet: Moderate Consistent Carbohydrate Diet (8504-9331 kcals per day)    Supplements: Ensure Clear daily with breakfast and Ensure Plus with lunch and dinner    Intake: Breakfast: 50% scrambled eggs with mitchell.   Dinner 3/14: Pt split a small wings (5-6 wings) from [employer] (~400 kcal, 30 g protein)  Per RN flowsheet, pt eating 100% of 1-3  Meals daily. Pt reports drinking 100% of supplements TID and enjoys Ensure Clear     NEW FINDINGS   - Appetite remains varied - sometimes he is hungry and eats well, other times he has no appetite  - 3/13-3/15 -193  - WOCN 3/14 \"Moisture associated dermatitis to sacrum. Friction wound to right fleshy buttock\"  - Weight is up about 1 lb since last week and overall no change in weight since admit 1 month ago and down 9.1 kg (9.2%) in the last 6 months      MALNUTRITION  % Intake: < 75% for > 7 days (non-severe)  % Weight Loss: Up to 10% in 6 months (non-severe)  Subcutaneous Fat Loss: None observed  Muscle Loss: Thoracic region (clavicle, acromium bone, deltoid, trapezius, pectoral): Mild/moderate and Upper arm (bicep, tricep): Mild/moderate  Fluid Accumulation/Edema: None noted  Malnutrition Diagnosis: Non-severe malnutrition in the context of acute illness    Previous Goals   1. Patient to consume % of nutritionally adequate meal trays TID, or the equivalent with supplements/snacks.  Evaluation: Met    2. Weight maintenance.  Evaluation: Met    Previous Nutrition Diagnosis  Inadequate oral intake related to decreased appetite as evidenced by ordering small meals which " together with bid supplements meet <75% of assessed nutrition needs.  Evaluation: Improving    CURRENT NUTRITION DIAGNOSIS  Predicted inadequate nutrient intake protein-energy related to menu fatigue as evidenced by LOS x 28 days and previous poor oral intake and reliance on supplements to meet 100% of nutrient needs.    INTERVENTIONS  Implementation  Nutrition Education: Encouraged pt to continue ordering meals TID and drinking supplements. Alternative supplement options also discussed.    Goals  1. Patient to consume % of nutritionally adequate meal trays TID, or the equivalent with supplements/snacks.  2. Weight maintenance.    Monitoring/Evaluation  Progress toward goals will be monitored and evaluated per protocol.      Chelo Regalado  Dietetic Intern

## 2017-03-16 LAB
GLUCOSE BLDC GLUCOMTR-MCNC: 116 MG/DL (ref 70–99)
GLUCOSE BLDC GLUCOMTR-MCNC: 142 MG/DL (ref 70–99)
GLUCOSE BLDC GLUCOMTR-MCNC: 144 MG/DL (ref 70–99)
GLUCOSE BLDC GLUCOMTR-MCNC: 194 MG/DL (ref 70–99)
POTASSIUM SERPL-SCNC: 3.5 MMOL/L (ref 3.4–5.3)

## 2017-03-16 PROCEDURE — A9270 NON-COVERED ITEM OR SERVICE: HCPCS | Mod: GY | Performed by: INTERNAL MEDICINE

## 2017-03-16 PROCEDURE — 25000132 ZZH RX MED GY IP 250 OP 250 PS 637: Mod: GY | Performed by: INTERNAL MEDICINE

## 2017-03-16 PROCEDURE — 99207 ZZC CDG-MDM COMPONENT: MEETS HIGH - UP CODED: CPT | Performed by: INTERNAL MEDICINE

## 2017-03-16 PROCEDURE — 25000132 ZZH RX MED GY IP 250 OP 250 PS 637: Mod: GY | Performed by: PHYSICAL MEDICINE & REHABILITATION

## 2017-03-16 PROCEDURE — 97530 THERAPEUTIC ACTIVITIES: CPT | Mod: GP | Performed by: REHABILITATION PRACTITIONER

## 2017-03-16 PROCEDURE — 84132 ASSAY OF SERUM POTASSIUM: CPT | Performed by: INTERNAL MEDICINE

## 2017-03-16 PROCEDURE — 12800006 ZZH R&B REHAB

## 2017-03-16 PROCEDURE — 97110 THERAPEUTIC EXERCISES: CPT | Mod: GP | Performed by: REHABILITATION PRACTITIONER

## 2017-03-16 PROCEDURE — 97535 SELF CARE MNGMENT TRAINING: CPT | Mod: GO

## 2017-03-16 PROCEDURE — 25000131 ZZH RX MED GY IP 250 OP 636 PS 637: Mod: GY | Performed by: INTERNAL MEDICINE

## 2017-03-16 PROCEDURE — 00000146 ZZHCL STATISTIC GLUCOSE BY METER IP

## 2017-03-16 PROCEDURE — 40000193 ZZH STATISTIC PT WARD VISIT: Performed by: REHABILITATION PRACTITIONER

## 2017-03-16 PROCEDURE — 25000128 H RX IP 250 OP 636: Performed by: INTERNAL MEDICINE

## 2017-03-16 PROCEDURE — 97530 THERAPEUTIC ACTIVITIES: CPT | Mod: GP | Performed by: PHYSICAL THERAPIST

## 2017-03-16 PROCEDURE — 25000125 ZZHC RX 250: Performed by: PHYSICAL MEDICINE & REHABILITATION

## 2017-03-16 PROCEDURE — 36592 COLLECT BLOOD FROM PICC: CPT | Performed by: INTERNAL MEDICINE

## 2017-03-16 PROCEDURE — 99233 SBSQ HOSP IP/OBS HIGH 50: CPT | Performed by: INTERNAL MEDICINE

## 2017-03-16 PROCEDURE — 40000133 ZZH STATISTIC OT WARD VISIT

## 2017-03-16 PROCEDURE — 40000193 ZZH STATISTIC PT WARD VISIT: Performed by: PHYSICAL THERAPIST

## 2017-03-16 PROCEDURE — A9270 NON-COVERED ITEM OR SERVICE: HCPCS | Mod: GY | Performed by: PHYSICAL MEDICINE & REHABILITATION

## 2017-03-16 RX ADMIN — LISINOPRIL 10 MG: 10 TABLET ORAL at 07:57

## 2017-03-16 RX ADMIN — ASPIRIN 325 MG ORAL TABLET 325 MG: 325 PILL ORAL at 07:56

## 2017-03-16 RX ADMIN — SODIUM CHLORIDE, PRESERVATIVE FREE 5 ML: 5 INJECTION INTRAVENOUS at 05:27

## 2017-03-16 RX ADMIN — MIRTAZAPINE 15 MG: 7.5 TABLET, FILM COATED ORAL at 20:50

## 2017-03-16 RX ADMIN — METFORMIN HYDROCHLORIDE 500 MG: 500 TABLET ORAL at 18:24

## 2017-03-16 RX ADMIN — OXYCODONE HYDROCHLORIDE AND ACETAMINOPHEN 500 MG: 500 TABLET ORAL at 07:58

## 2017-03-16 RX ADMIN — METFORMIN HYDROCHLORIDE 500 MG: 500 TABLET ORAL at 07:58

## 2017-03-16 RX ADMIN — VITAMIN D, TAB 1000IU (100/BT) 1000 UNITS: 25 TAB at 07:57

## 2017-03-16 RX ADMIN — METOPROLOL TARTRATE 50 MG: 50 TABLET, FILM COATED ORAL at 20:51

## 2017-03-16 RX ADMIN — CEFTRIAXONE 2 G: 2 INJECTION, POWDER, FOR SOLUTION INTRAMUSCULAR; INTRAVENOUS at 20:48

## 2017-03-16 RX ADMIN — ATORVASTATIN CALCIUM 40 MG: 40 TABLET, FILM COATED ORAL at 07:57

## 2017-03-16 RX ADMIN — CEFTRIAXONE 2 G: 2 INJECTION, POWDER, FOR SOLUTION INTRAMUSCULAR; INTRAVENOUS at 07:56

## 2017-03-16 RX ADMIN — METOPROLOL TARTRATE 50 MG: 50 TABLET, FILM COATED ORAL at 07:57

## 2017-03-16 RX ADMIN — POTASSIUM CHLORIDE 20 MEQ: 750 TABLET, EXTENDED RELEASE ORAL at 07:57

## 2017-03-16 RX ADMIN — INSULIN ASPART 1 UNITS: 100 INJECTION, SOLUTION INTRAVENOUS; SUBCUTANEOUS at 07:59

## 2017-03-16 RX ADMIN — RANITIDINE HYDROCHLORIDE 150 MG: 150 TABLET, FILM COATED ORAL at 20:50

## 2017-03-16 RX ADMIN — TAMSULOSIN HYDROCHLORIDE 0.8 MG: 0.4 CAPSULE ORAL at 20:49

## 2017-03-16 RX ADMIN — CLOPIDOGREL BISULFATE 75 MG: 75 TABLET, FILM COATED ORAL at 07:56

## 2017-03-16 RX ADMIN — RANITIDINE HYDROCHLORIDE 150 MG: 150 TABLET, FILM COATED ORAL at 07:58

## 2017-03-16 RX ADMIN — INSULIN GLARGINE 20 UNITS: 100 INJECTION, SOLUTION SUBCUTANEOUS at 07:58

## 2017-03-16 RX ADMIN — INSULIN ASPART 2 UNITS: 100 INJECTION, SOLUTION INTRAVENOUS; SUBCUTANEOUS at 12:37

## 2017-03-16 RX ADMIN — FUROSEMIDE 40 MG: 40 TABLET ORAL at 07:57

## 2017-03-16 RX ADMIN — SODIUM CHLORIDE, PRESERVATIVE FREE 5 ML: 5 INJECTION INTRAVENOUS at 20:52

## 2017-03-16 RX ADMIN — POTASSIUM CHLORIDE 20 MEQ: 750 TABLET, FILM COATED, EXTENDED RELEASE ORAL at 08:03

## 2017-03-16 NOTE — PLAN OF CARE
Problem: Goal/Outcome  Goal: Goal Outcome Summary  Outcome: No Change  FOCUS/GOAL  Bladder management and Mobility     ASSESSMENT, INTERVENTIONS AND CONTINUING PLAN FOR GOAL:  Pt appeared to be sleeping on rounds. He did call for assistance with repositioning in bed, and called to have his urinal emptied. Per evening report, Dr Schaefer will follow up today regarding orders for blood transfusion.

## 2017-03-16 NOTE — PROGRESS NOTES
[facility]   Internal Medicine Daily Note          Assessment and Plan:    Tad Manning is a 76-year-old  gentleman transferred from the [facility] after T7-T12 posterior spinal segmental instrumentation and T9-T10 interbody fusion and posterior arthrodesis T70-T12 for a spinal abscess with osteomyelitis and diskitis.     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   Weekly CBC, CRP and LFT's. Labs on 3/15 with normal LFT's  CRP at 81 ( on 3/13 at 74.7)  Stable WBC and platelet counts .Followup with Infectious Disease, Dr. Gillian Max on 03/23/2017.     # DM Type 2 ( well controlled- A1c on 2/10/2017 @ 6.1)   Continue Lantus 20U , Metformin 500mg bid , Correction scale insulin   Stable sugars      # Acute systolic CHF   Echo on 02/24 showed decreased LVEF and severe global hypokinesis. Evaluated by cardiology   Currently on medical treatment for heart failure with beta blocker ( metoprolol 50 mg BID)  , ACE inhibitor ( Lisinopril 10 mg daily) and diuresis   Given some lightheadedness and ortho stasis, Lasix reduced to 40 mg daily on 3/15 ( from 40 mg am and 20 mg pm). Scheduled potassium supplement at 20 meq initiated on 3/15  Continue with daily weights. Stable between 88-91 Kgs.  Cardiology f/u after d/c with repeat echo     #Tachycardia:    Atrial flutter with variable block , electrolytes stable .CHADSVASC score is 4 (CHF, HTN, Age, DM, vascular disease) and long term AC would be recommended; however risk vs benefit would need to closely evaluated. With history of frequent falls we would be concerned about head injury. I discussed with primary thompson as well, who feel that the risk of falls is high.  In any case, patient is on aspirin 325 mg  and clopidogrel 75 mg   Issues of anticoagulation to be re  "discussed with cardiology as outpatient     Peripheral vascular disease, status post bilateral lower extremity stents:   Continue aspirin , Plavix( started 2/20)     # Chronic lymphocytic leukemia in remission, last chemo was 09/2015: Follow up with Oncology.   # Coronary artery disease: Continue aspirin 325 mg daily, Lipitor 40 mg daily, (Changed atenolol to Metoprolol 2/24).   # Spinal cord compression with lower extremity weakness: plan per Primary team            Anemia of Chronic disease:  Stable. Hgb at 8.1. No issues with lightheadedness or hypotension  Transfusion for Hgb <7    Consulting teams: Internal Medicine   Code status: Full   DVT Prophylaxis: Aspirin 325 mg   Gastric prophylaxis: H2 blocker   Diet: Moderate consistent carbohydrate diet   Disposition: to be determined       Patient seen and examined with RN         Interval History:   Progress notes in the last 24 hrs by MDT team has been reviewed.  Tad feels well this morning   He had an uneventful night  He denies chest pain/ SOB, lightheadedness or fatigue  Denies fevers or chills  Tolerating diet well          Review of Systems:   A comprehensive review of systems was performed and found to be negative except: Those that are outlined in interval history              Medications:   I have reviewed this patient's current medications which are outlined in the \"current medication\" section of EPIC             Physical Exam:   Vitals were reviewed  Temp: 96.1  F (35.6  C) Temp src: Oral BP: 115/60 Pulse: 89 Heart Rate: 86 Resp: 24 SpO2: 98 % O2 Device: None (Room air) Oxygen Delivery: 2 LPM  Constitutional:   awake, alert, cooperative, no apparent distress, and appears stated age     Lungs:   No increased work of breathing, good air exchange, clear to auscultation bilaterally, no crackles or wheezing     Cardiovascular:   Irregular heart sounds. Mild tachycardia. No murmurs. No peripheral edema      Abdomen:   No scars, normal bowel sounds, soft, " non-distended, non-tender, no masses palpated, no hepatosplenomegally     Musculoskeletal:   no lower extremity pitting edema present. Back incision has healed well      Neurologic:   Awake, alert, oriented to name, place and time.  Cranial nerves II-XII are grossly intact.             Data:     Most recent labs have been evaluated and relevant labs are outlined below :    BMP    Recent Labs  Lab 03/16/17  0532 03/15/17  0742 03/14/17  0703 03/13/17  0758 03/12/17  2123 03/10/17  0724   NA  --  143  --  143 142 143   POTASSIUM 3.5 3.6 3.5 3.5 3.0* 4.0   CHLORIDE  --  108  --  109 106 109   JUSTINO  --  8.4*  --  8.1* 7.9* 8.5   CO2  --  24  --  24 26 25   BUN  --  18  --  14 14 14   CR  --  0.66  --  0.57* 0.57* 0.61*   GLC  --  146*  --  156* 194* 167*     CBC    Recent Labs  Lab 03/15/17  0742 03/13/17  0758 03/12/17  2123 03/10/17  0724   WBC 5.6 5.4 5.9 9.6   RBC 3.12* 3.21* 3.13* 3.78*   HGB 8.1* 8.7* 8.5* 10.2*   HCT 27.7* 28.7* 28.0* 34.6*   MCV 89 89 90 92   MCH 26.0* 27.1 27.2 27.0   MCHC 29.2* 30.3* 30.4* 29.5*   RDW 16.9* 17.0* 17.2* 17.2*    180 175 205     INRNo lab results found in last 7 days.  LFTs    Recent Labs  Lab 03/15/17  0742 03/13/17  0758 03/10/17  0724   ALKPHOS 138 163* 230*   AST 20 12 13   ALT 24 23 35   BILITOTAL  --  0.5 0.5   PROTTOTAL 5.4* 5.4* 6.1*   ALBUMIN 2.1* 2.1* 2.5*      PANCNo lab results found in last 7 days.    Case discussed with  Primary team      Dr NIRANJAN Schaefer MD  Hospitalist ( Internal medicine)  Pager: [phone]

## 2017-03-16 NOTE — PROGRESS NOTES
PM&R Daily Note:    Formal team rounds held today, please see separate document in Plan of Care tab for full details. Briefly had home visit yesterday with several barriers. Space access will require some door frame modification. The bathroom will be particularly limiting and shower will not be effective. Not yet consistent with his transfers. His support is quite limited in terms of personal cares. Really is looking that he'll need longer term wheelchair accessible housing. He does have considerable cardiopulmonary limitations also impact his consistency. There are difficulties with carry over and similar conversations occur between days. Really looking at needing additional recovery time or assisted living facility if that could be secured on the bit shorter time course. Might justify another few weeks acute rehab if home / new USP could be established otherwise looking at TCU referral sooner.    tachycardia improved, not symptomatic. Close monitoring though will trial back on diuretics. Monitor with heart failure history. Has h/o atrial flutter with variable block. Appreciate hospitalist consult support - discussed with Dr. Schaefer    Home visit tomorrow to assess function in that setting and help with d/c planning details. Has had longer rehab course and still hoping for a home disposition if reasonably safe.    BG for DM continue with glargine 20 u daily , metformin and CC.    Vitals:    03/15/17 0855 03/15/17 1052 03/15/17 1612 03/15/17 2009   BP: (!) 124/100 109/62 119/60 113/50   BP Location:   Left arm Left arm   Pulse: 92  88 94   Resp:   16    Temp:   96.9  F (36.1  C)    TempSrc:   Oral    SpO2:   97%    Weight:       Height:         Alert, pleasant  No diaphoresis  Observed some in therapy session. Working on transfers. Still needing assistance between w/c and bed.      potassium chloride  20 mEq Oral Daily     insulin glargine  20 Units Subcutaneous QAM AC     furosemide  40 mg Oral Daily      lisinopril  10 mg Oral Daily     mirtazapine  15 mg Oral At Bedtime     ranitidine  150 mg Oral BID     metoprolol  50 mg Oral BID     metFORMIN  500 mg Oral BID w/meals     clopidogrel  75 mg Oral Daily     ascorbic acid (VITAMIN C) tablet 500 mg  500 mg Oral Daily     aspirin  325 mg Oral Daily     atorvastatin  40 mg Oral Daily     cefTRIAXone  2 g Intravenous Q12H     cholecalciferol  1,000 Units Oral Daily     tamsulosin  0.8 mg Oral At Bedtime     insulin aspart  1-7 Units Subcutaneous TID AC     insulin aspart  1-5 Units Subcutaneous At Bedtime     heparin lock flush  5-10 mL Intracatheter Q24H     Coordination of care:  25 minutes  Face-to-face:              10 minutes  Total time:                  35 minutes

## 2017-03-16 NOTE — PLAN OF CARE
Problem: Goal/Outcome  Goal: Goal Outcome Summary  Outcome: Improving  FOCUS/GOAL  Medical management     ASSESSMENT, INTERVENTIONS AND CONTINUING PLAN FOR GOAL:  Pt had high RR and HR this AM, o2 stats down to 88%, O2 needed @2LPM and up to 95-98%, cancelled his PT and OT therapies in Am. Md updated and ordered to monitor pt being in bed, no new changes occurred since the morning, (pls see f/sheets). PICC line patent and hep lock.  Nursing will cont POC

## 2017-03-16 NOTE — PROGRESS NOTES
03/16/17 1500   Visit Information   Visit Made By Staff    Type of Visit Follow-up   Visited Patient   Interventions   Plan of Care Review   With patient/family/proxy   Basic Spiritual Interventions   Life Review   Advanced Assessments/Interventions   Presenting Concerns/Issues Spiritual/P[employer]ostalism/emotional support   SPIRITUAL HEALTH SERVICES   Spiritual Assessment Progress Note   [facility] ([facility] 5R    REFERRAL SOURCE: F/U visit to assess how Tad is faring further along in his process.   On this visit, we checked in on Tad's emotional space.   EXPERIENCE OF ILLNESS/HOSPITALIZATION: Tad said that he is feeling a bit weaker than before.   MEANING-MAKING: Tad regaled me with more stories of his [employer].   SPIRITUALITY/VALUES/Mosque: n/d   COPING/SPIRITUAL PRACTICES: n/d   SUPPORT SYSTEMS: His family (his niece), and his friends.   PLAN: No further visits planned at this time.    Rev. Tate Marie  Staff   Spiritual Health Services  Pager: [phone]  Office: [phone]

## 2017-03-16 NOTE — PLAN OF CARE
Problem: Goal/Outcome  Goal: Goal Outcome Summary  Outcome: Therapy, progress toward functional goals as expected  OT: pt was SBA for functional transfer with SBA from bed to w/c. Pt was noted to have significant vital change though within safe ranges and vitals subsided with rest breaks and pacing of activity. See vitals flow sheet for details.

## 2017-03-16 NOTE — PLAN OF CARE
Problem: Goal/Outcome  Goal: Goal Outcome Summary  PT:  Pt wanting to trailing standing, pt demo standing x 3 up to 20 to 40 sec in // bars needing min to mod A. Pt demo seated there x program x 10. Pt demo Nustep x 12 min at 45 steps . Pt needing long rests due to elevated HR ranging ,

## 2017-03-16 NOTE — PLAN OF CARE
Problem: Goal/Outcome  Goal: Goal Outcome Summary  Outcome: No Change  Alert and oriented x 4 denied having pain and discomfort, surgical incision on back ALISHA and intact, changed PICC line dressing, gave IV antibiotic x 1, good appetite and fluid intake, edema BLE, changed coccyx area dressing, non blanchable redness noted, Hgb is 8.1,  Paged Dr lancaster, no order for blood transfusion, he will see the pt tomorrow.

## 2017-03-17 LAB
ALBUMIN SERPL-MCNC: 2.2 G/DL (ref 3.4–5)
ALP SERPL-CCNC: 140 U/L (ref 40–150)
ALT SERPL W P-5'-P-CCNC: 25 U/L (ref 0–70)
ANION GAP SERPL CALCULATED.3IONS-SCNC: 8 MMOL/L (ref 3–14)
AST SERPL W P-5'-P-CCNC: 13 U/L (ref 0–45)
BILIRUB DIRECT SERPL-MCNC: 0.1 MG/DL (ref 0–0.2)
BILIRUB SERPL-MCNC: 0.3 MG/DL (ref 0.2–1.3)
BUN SERPL-MCNC: 16 MG/DL (ref 7–30)
CALCIUM SERPL-MCNC: 8.6 MG/DL (ref 8.5–10.1)
CHLORIDE SERPL-SCNC: 111 MMOL/L (ref 94–109)
CO2 SERPL-SCNC: 26 MMOL/L (ref 20–32)
CREAT SERPL-MCNC: 0.57 MG/DL (ref 0.66–1.25)
ERYTHROCYTE [DISTWIDTH] IN BLOOD BY AUTOMATED COUNT: 16.7 % (ref 10–15)
GFR SERPL CREATININE-BSD FRML MDRD: ABNORMAL ML/MIN/1.7M2
GLUCOSE BLDC GLUCOMTR-MCNC: 123 MG/DL (ref 70–99)
GLUCOSE BLDC GLUCOMTR-MCNC: 170 MG/DL (ref 70–99)
GLUCOSE BLDC GLUCOMTR-MCNC: 182 MG/DL (ref 70–99)
GLUCOSE BLDC GLUCOMTR-MCNC: 211 MG/DL (ref 70–99)
GLUCOSE SERPL-MCNC: 128 MG/DL (ref 70–99)
HCT VFR BLD AUTO: 29 % (ref 40–53)
HGB BLD-MCNC: 8.6 G/DL (ref 13.3–17.7)
MCH RBC QN AUTO: 26.8 PG (ref 26.5–33)
MCHC RBC AUTO-ENTMCNC: 29.7 G/DL (ref 31.5–36.5)
MCV RBC AUTO: 90 FL (ref 78–100)
PLATELET # BLD AUTO: 180 10E9/L (ref 150–450)
POTASSIUM SERPL-SCNC: 3.7 MMOL/L (ref 3.4–5.3)
PROT SERPL-MCNC: 5.5 G/DL (ref 6.8–8.8)
RBC # BLD AUTO: 3.21 10E12/L (ref 4.4–5.9)
SODIUM SERPL-SCNC: 145 MMOL/L (ref 133–144)
WBC # BLD AUTO: 5.1 10E9/L (ref 4–11)

## 2017-03-17 PROCEDURE — 80076 HEPATIC FUNCTION PANEL: CPT | Performed by: INTERNAL MEDICINE

## 2017-03-17 PROCEDURE — 80048 BASIC METABOLIC PNL TOTAL CA: CPT | Performed by: INTERNAL MEDICINE

## 2017-03-17 PROCEDURE — A9270 NON-COVERED ITEM OR SERVICE: HCPCS | Mod: GY | Performed by: INTERNAL MEDICINE

## 2017-03-17 PROCEDURE — 85027 COMPLETE CBC AUTOMATED: CPT | Performed by: INTERNAL MEDICINE

## 2017-03-17 PROCEDURE — 99233 SBSQ HOSP IP/OBS HIGH 50: CPT | Performed by: INTERNAL MEDICINE

## 2017-03-17 PROCEDURE — 97530 THERAPEUTIC ACTIVITIES: CPT | Mod: GP | Performed by: PHYSICAL THERAPIST

## 2017-03-17 PROCEDURE — 12800006 ZZH R&B REHAB

## 2017-03-17 PROCEDURE — 25000125 ZZHC RX 250: Performed by: PHYSICAL MEDICINE & REHABILITATION

## 2017-03-17 PROCEDURE — A9270 NON-COVERED ITEM OR SERVICE: HCPCS | Mod: GY | Performed by: PHYSICAL MEDICINE & REHABILITATION

## 2017-03-17 PROCEDURE — 40000193 ZZH STATISTIC PT WARD VISIT: Performed by: PHYSICAL THERAPIST

## 2017-03-17 PROCEDURE — 00000146 ZZHCL STATISTIC GLUCOSE BY METER IP

## 2017-03-17 PROCEDURE — 25000128 H RX IP 250 OP 636: Performed by: INTERNAL MEDICINE

## 2017-03-17 PROCEDURE — 25000132 ZZH RX MED GY IP 250 OP 250 PS 637: Mod: GY | Performed by: INTERNAL MEDICINE

## 2017-03-17 PROCEDURE — 97112 NEUROMUSCULAR REEDUCATION: CPT | Mod: GP | Performed by: PHYSICAL THERAPIST

## 2017-03-17 PROCEDURE — 97110 THERAPEUTIC EXERCISES: CPT | Mod: GP | Performed by: PHYSICAL THERAPIST

## 2017-03-17 PROCEDURE — 36592 COLLECT BLOOD FROM PICC: CPT | Performed by: INTERNAL MEDICINE

## 2017-03-17 PROCEDURE — 97535 SELF CARE MNGMENT TRAINING: CPT | Mod: GO | Performed by: OCCUPATIONAL THERAPIST

## 2017-03-17 PROCEDURE — 99207 ZZC CDG-CUT & PASTE-POTENTIAL IMPACT ON LEVEL: CPT | Performed by: INTERNAL MEDICINE

## 2017-03-17 PROCEDURE — 40000133 ZZH STATISTIC OT WARD VISIT: Performed by: OCCUPATIONAL THERAPIST

## 2017-03-17 PROCEDURE — 25000132 ZZH RX MED GY IP 250 OP 250 PS 637: Mod: GY | Performed by: PHYSICAL MEDICINE & REHABILITATION

## 2017-03-17 RX ADMIN — CEFTRIAXONE 2 G: 2 INJECTION, POWDER, FOR SOLUTION INTRAMUSCULAR; INTRAVENOUS at 19:49

## 2017-03-17 RX ADMIN — RANITIDINE HYDROCHLORIDE 150 MG: 150 TABLET, FILM COATED ORAL at 19:49

## 2017-03-17 RX ADMIN — VITAMIN D, TAB 1000IU (100/BT) 1000 UNITS: 25 TAB at 08:34

## 2017-03-17 RX ADMIN — ASPIRIN 325 MG ORAL TABLET 325 MG: 325 PILL ORAL at 08:33

## 2017-03-17 RX ADMIN — METFORMIN HYDROCHLORIDE 500 MG: 500 TABLET ORAL at 17:38

## 2017-03-17 RX ADMIN — INSULIN ASPART 1 UNITS: 100 INJECTION, SOLUTION INTRAVENOUS; SUBCUTANEOUS at 11:53

## 2017-03-17 RX ADMIN — POTASSIUM CHLORIDE 20 MEQ: 750 TABLET, EXTENDED RELEASE ORAL at 08:34

## 2017-03-17 RX ADMIN — SODIUM CHLORIDE, PRESERVATIVE FREE 5 ML: 5 INJECTION INTRAVENOUS at 19:50

## 2017-03-17 RX ADMIN — METOPROLOL TARTRATE 50 MG: 50 TABLET, FILM COATED ORAL at 08:34

## 2017-03-17 RX ADMIN — ATORVASTATIN CALCIUM 40 MG: 40 TABLET, FILM COATED ORAL at 08:34

## 2017-03-17 RX ADMIN — LISINOPRIL 10 MG: 10 TABLET ORAL at 08:34

## 2017-03-17 RX ADMIN — OXYCODONE HYDROCHLORIDE AND ACETAMINOPHEN 500 MG: 500 TABLET ORAL at 08:34

## 2017-03-17 RX ADMIN — FUROSEMIDE 40 MG: 40 TABLET ORAL at 08:34

## 2017-03-17 RX ADMIN — MELATONIN TAB 3 MG 3 MG: 3 TAB at 23:59

## 2017-03-17 RX ADMIN — INSULIN ASPART 2 UNITS: 100 INJECTION, SOLUTION INTRAVENOUS; SUBCUTANEOUS at 18:15

## 2017-03-17 RX ADMIN — MIRTAZAPINE 15 MG: 7.5 TABLET, FILM COATED ORAL at 19:49

## 2017-03-17 RX ADMIN — SODIUM CHLORIDE, PRESERVATIVE FREE 5 ML: 5 INJECTION INTRAVENOUS at 05:36

## 2017-03-17 RX ADMIN — CLOPIDOGREL BISULFATE 75 MG: 75 TABLET, FILM COATED ORAL at 08:34

## 2017-03-17 RX ADMIN — METFORMIN HYDROCHLORIDE 500 MG: 500 TABLET ORAL at 08:34

## 2017-03-17 RX ADMIN — RANITIDINE HYDROCHLORIDE 150 MG: 150 TABLET, FILM COATED ORAL at 08:34

## 2017-03-17 RX ADMIN — CEFTRIAXONE 2 G: 2 INJECTION, POWDER, FOR SOLUTION INTRAMUSCULAR; INTRAVENOUS at 08:35

## 2017-03-17 RX ADMIN — TAMSULOSIN HYDROCHLORIDE 0.8 MG: 0.4 CAPSULE ORAL at 19:49

## 2017-03-17 RX ADMIN — INSULIN GLARGINE 20 UNITS: 100 INJECTION, SOLUTION SUBCUTANEOUS at 08:54

## 2017-03-17 RX ADMIN — METOPROLOL TARTRATE 50 MG: 50 TABLET, FILM COATED ORAL at 19:49

## 2017-03-17 NOTE — PROGRESS NOTES
"PM&R Daily Note:    Talked with Tad about expectations and disposition. He seems to think the home evaluation \"went pretty good\" but when pressed acknowledged several barriers with space and w/c access. Given his duration out, the slow gains, additional limits with endurance and medically, do feel he'd do best in an assisted living facility. His significant other is not seeming to be in line for providing the assistance Tad is needing including with personal cares. Despite outlining some guarded prognosis, he's wanting to continue to do as much as he can and seems not welling to entertain a different living situation yet. Preferring to get to a TCU and come back to [facility] option only after further recovery time. Can begin the TCU placement process.  Some of this may be reflective of some relative optimism / denial / acceptance though also concerned some with cognitive processing and memory.    Today was feeling off and didn't perform with as much energy in AM but seeming better by afternoon sessions.     Vitals:    03/16/17 1415 03/16/17 1445 03/16/17 1553 03/16/17 2051   BP: 160/79 131/69 123/65 118/69   BP Location: Left arm Left arm Left arm    Pulse: 92 63 93 107   Resp:   18    Temp:   97.3  F (36.3  C)    TempSrc:   Oral    SpO2:   98%    Weight:       Height:         Fully alert, conversant.  No cough or wheeze  In manual wheelchair observed during some PT session some standing attempts in parallel bars. Lasting only about 30 seconds a few times. Needing min to moderate assistance.    Coordination of care:  15 minutes  Face-to-face:              20 minutes  Total time:                  35 minutes   "

## 2017-03-17 NOTE — PLAN OF CARE
Problem: Goal/Outcome  Goal: Goal Outcome Summary  OT: Pt completed w/c propulsion to increase BUE strength and functional endurance to increase ind in ADLS/IADLS, pt propelled w/c ~250ft x2 w/ SBA and modulating rest breaks appropriately for EC prn

## 2017-03-17 NOTE — PLAN OF CARE
Problem: Goal/Outcome  Goal: Goal Outcome Summary  Pt accepted to [facility] TCU for continued support and therapy. Planning to d/c on Yunior 3/19. Transportation arranged via [employer] @ 1 pm.  Provided education and attained signature on [employer]   Completed PAS, confirmation # [id number]

## 2017-03-17 NOTE — PROGRESS NOTES
[facility]   Internal Medicine Daily Note          Assessment and Plan:    Tad Manning is a 76-year-old  gentleman transferred from the [facility] after T7-T12 posterior spinal segmental instrumentation and T9-T10 interbody fusion and posterior arthrodesis T70-T12 for a spinal abscess with osteomyelitis and diskitis.     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   Weekly CBC, CRP and LFT's. Labs on 3/15 with normal LFT's  CRP at 81 ( on 3/13 at 74.7)  Stable WBC and platelet counts .Followup with Infectious Disease, Dr. Gillian Max on 03/23/2017.     # DM Type 2 ( well controlled- A1c on 2/10/2017 @ 6.1)   Continue Lantus 20U , Metformin 500mg bid , Correction scale insulin  Stable sugars      # Acute systolic CHF   Echo on 02/24 showed decreased LVEF and severe global hypokinesis. Evaluated by cardiology   Currently on medical treatment for heart failure with beta blocker ( metoprolol 50 mg BID)  , ACE inhibitor ( Lisinopril 10 mg daily) and diuresis   Given some lightheadedness and ortho stasis, Lasix reduced to 40 mg daily on 3/15 ( from 40 mg am and 20 mg pm). Scheduled potassium supplement at 20 meq initiated on 3/15  Continue with daily weights. Stable between 88-91 Kgs.  Cardiology f/u after d/c with repeat echo     #Tachycardia:    Atrial flutter with variable block , electrolytes stable .CHADSVASC score is 4 (CHF, HTN, Age, DM, vascular disease) and long term AC would be recommended; however risk vs benefit would need to closely evaluated. With history of frequent falls we would be concerned about head injury. I discussed with primary thompson as well, who feel that the risk of falls is high.  In any case, patient is on aspirin 325 mg  and clopidogrel 75 mg   Issues of anticoagulation to be re  "discussed with cardiology as outpatient     Peripheral vascular disease, status post bilateral lower extremity stents:   Continue aspirin , Plavix( started 2/20)     # Chronic lymphocytic leukemia in remission, last chemo was 09/2015: Follow up with Oncology.   # Coronary artery disease: Continue aspirin 325 mg daily, Lipitor 40 mg daily, (Changed atenolol to Metoprolol 2/24).   # Spinal cord compression with lower extremity weakness: plan per Primary team            Anemia of Chronic disease:  Stable. Hgb at 8.6. No issues with lightheadedness or hypotension  Transfusion for Hgb <7    Consulting teams: Internal Medicine   Code status: Full   DVT Prophylaxis: Aspirin 325 mg   Gastric prophylaxis: H2 blocker   Diet: Moderate consistent carbohydrate diet   Disposition: to be determined       Patient seen and examined with RN         Interval History:   Progress notes in the last 24 hrs by MDT team has been reviewed.  Feels well this morning   Slept well   denies chest pain/ SOB  Denies fevers or chills        Review of Systems:   A comprehensive review of systems was performed and found to be negative except: Those that are outlined in interval history              Medications:   I have reviewed this patient's current medications which are outlined in the \"current medication\" section of EPIC             Physical Exam:   Vitals were reviewed  Temp: 97.3  F (36.3  C) Temp src: Oral BP: 118/69 Pulse: 107 Heart Rate: 86 Resp: 18 SpO2: 98 % O2 Device: None (Room air) Oxygen Delivery: 2 LPM  Constitutional:   awake, alert, cooperative, no apparent distress, and appears stated age     Lungs:   No increased work of breathing, good air exchange, clear to auscultation bilaterally, no crackles or wheezing     Cardiovascular:   Irregular heart sounds. Mild tachycardia. No murmurs. No peripheral edema      Abdomen:   No scars, normal bowel sounds, soft, non-distended, non-tender, no masses palpated, no hepatosplenomegally "     Musculoskeletal:   no lower extremity pitting edema present. Back incision has healed well      Neurologic:   Awake, alert, oriented to name, place and time.  Cranial nerves II-XII are grossly intact.             Data:     Most recent labs have been evaluated and relevant labs are outlined below :    BMP    Recent Labs  Lab 03/17/17  0544 03/16/17  0532 03/15/17  0742 03/14/17  0703 03/13/17  0758 03/12/17 2123   *  --  143  --  143 142   POTASSIUM 3.7 3.5 3.6 3.5 3.5 3.0*   CHLORIDE 111*  --  108  --  109 106   JUSTINO 8.6  --  8.4*  --  8.1* 7.9*   CO2 26  --  24  --  24 26   BUN 16  --  18  --  14 14   CR 0.57*  --  0.66  --  0.57* 0.57*   *  --  146*  --  156* 194*     CBC    Recent Labs  Lab 03/17/17 0544 03/15/17  0742 03/13/17  0758 03/12/17 2123   WBC 5.1 5.6 5.4 5.9   RBC 3.21* 3.12* 3.21* 3.13*   HGB 8.6* 8.1* 8.7* 8.5*   HCT 29.0* 27.7* 28.7* 28.0*   MCV 90 89 89 90   MCH 26.8 26.0* 27.1 27.2   MCHC 29.7* 29.2* 30.3* 30.4*   RDW 16.7* 16.9* 17.0* 17.2*    173 180 175     INRNo lab results found in last 7 days.  LFTs    Recent Labs  Lab 03/17/17  0544 03/15/17  0742 03/13/17 0758   ALKPHOS 140 138 163*   AST 13 20 12   ALT 25 24 23   BILITOTAL 0.3  --  0.5   PROTTOTAL 5.5* 5.4* 5.4*   ALBUMIN 2.2* 2.1* 2.1*      PANCNo lab results found in last 7 days.          Dr NIRANJAN Schaefer MD  Hospitalist ( Internal medicine)  Pager: [phone]

## 2017-03-17 NOTE — PLAN OF CARE
Problem: Goal/Outcome  Goal: Goal Outcome Summary  Outcome: No Change  FOCUS/GOAL  Bladder management and Mobility     ASSESSMENT, INTERVENTIONS AND CONTINUING PLAN FOR GOAL:  Pt has been sleeping well tonight, and is able to reposition himself, or calls if he needs some help. Pt uses bedside urinal for voiding, and staff empties for him.

## 2017-03-17 NOTE — PROGRESS NOTES
PM&R Daily Note:    Was able to get placeemnt at a TCU for Sunday. Will proceed with that plan. His progress has been slow. Do anticipate he'll need longer term wheelchair use and accessible housing.  Limitations with strength and cardio/pulmonary endurance. Also some cognitive mild deficits woul impact his independent living with his complex situation.    Vitals:    03/17/17 0834 03/17/17 0901 03/17/17 1556 03/17/17 1643   BP: 146/79 144/78  115/58   BP Location:  Left arm  Left arm   Pulse: 132 107     Resp:  17 16    Temp:  95.9  F (35.5  C) 97.6  F (36.4  C)    TempSrc:  Oral Oral    SpO2:  94% 96%    Weight:       Height:         Appreciate hospitalist consult support.   Finishing ceftriaxone 3/24  DM with stable insulin metformin dosing  Diuretics continue at lower dose, following tachycardia

## 2017-03-17 NOTE — PLAN OF CARE
Problem: Goal/Outcome  Goal: Goal Outcome Summary  Placed referrals for TCU for continued support and therapy. Placed referrals to:     [facility]  [facility]andra Vela - [facility] - Lance Velasquez     Currently awaiting an available bed.

## 2017-03-17 NOTE — PLAN OF CARE
Problem: Goal/Outcome  Goal: Goal Outcome Summary  Outcome: No Change  Patient is alert and oriented and able to make his needs known. Patient's PICC line patent and blood return noted. Dressing is clean, dry, and intact. Patient has been continent of bowel and bladder this shift and patient did have a bowel movement. Blood glucose levels were 123 and 170. Patient has non-blanchable redness to buttocks with Mepilex in place. Dressing CDI. Groin appears to be healed. No redness noted. Patient has been transferring with assist of one staff with sliding board or squat pivot. Patient takes his medications whole without difficulty. No other concerns noted.

## 2017-03-17 NOTE — PLAN OF CARE
Problem: Goal/Outcome  Goal: Goal Outcome Summary  Outcome: No Change  Alert and oriented x 4, denied having pain, HR was 107 around 2100, then at 2140 it was 92 asymptomatic, PICC line dressing intact gave IV antibiotic x 1, good appetite and fluid intake, edema BLE, changed coccyx area dressing no drainage blanchable redness noted, had small BM, surgical incision intact and ALISHA.

## 2017-03-17 NOTE — PLAN OF CARE
Problem: Goal/Outcome  Goal: Goal Outcome Summary  Continues w/ poor CV activity tolerance, HR in 130's and ALEXIS ongoing during interventions.

## 2017-03-18 LAB
GLUCOSE BLDC GLUCOMTR-MCNC: 129 MG/DL (ref 70–99)
GLUCOSE BLDC GLUCOMTR-MCNC: 146 MG/DL (ref 70–99)
GLUCOSE BLDC GLUCOMTR-MCNC: 151 MG/DL (ref 70–99)
GLUCOSE BLDC GLUCOMTR-MCNC: 214 MG/DL (ref 70–99)

## 2017-03-18 PROCEDURE — 40000133 ZZH STATISTIC OT WARD VISIT: Performed by: OCCUPATIONAL THERAPIST

## 2017-03-18 PROCEDURE — 25000128 H RX IP 250 OP 636: Performed by: INTERNAL MEDICINE

## 2017-03-18 PROCEDURE — 40000193 ZZH STATISTIC PT WARD VISIT

## 2017-03-18 PROCEDURE — 99233 SBSQ HOSP IP/OBS HIGH 50: CPT | Performed by: INTERNAL MEDICINE

## 2017-03-18 PROCEDURE — 25000132 ZZH RX MED GY IP 250 OP 250 PS 637: Mod: GY | Performed by: INTERNAL MEDICINE

## 2017-03-18 PROCEDURE — A9270 NON-COVERED ITEM OR SERVICE: HCPCS | Mod: GY | Performed by: INTERNAL MEDICINE

## 2017-03-18 PROCEDURE — 97530 THERAPEUTIC ACTIVITIES: CPT | Mod: GP

## 2017-03-18 PROCEDURE — 25000125 ZZHC RX 250: Performed by: PHYSICAL MEDICINE & REHABILITATION

## 2017-03-18 PROCEDURE — 00000146 ZZHCL STATISTIC GLUCOSE BY METER IP

## 2017-03-18 PROCEDURE — 97110 THERAPEUTIC EXERCISES: CPT | Mod: GO | Performed by: OCCUPATIONAL THERAPIST

## 2017-03-18 PROCEDURE — 97530 THERAPEUTIC ACTIVITIES: CPT | Mod: GO | Performed by: OCCUPATIONAL THERAPIST

## 2017-03-18 PROCEDURE — 12800006 ZZH R&B REHAB

## 2017-03-18 PROCEDURE — 25000132 ZZH RX MED GY IP 250 OP 250 PS 637: Mod: GY | Performed by: PHYSICAL MEDICINE & REHABILITATION

## 2017-03-18 PROCEDURE — A9270 NON-COVERED ITEM OR SERVICE: HCPCS | Mod: GY | Performed by: PHYSICAL MEDICINE & REHABILITATION

## 2017-03-18 PROCEDURE — 97535 SELF CARE MNGMENT TRAINING: CPT | Mod: GO | Performed by: OCCUPATIONAL THERAPIST

## 2017-03-18 PROCEDURE — 97110 THERAPEUTIC EXERCISES: CPT | Mod: GP

## 2017-03-18 PROCEDURE — 99207 ZZC CDG-CUT & PASTE-POTENTIAL IMPACT ON LEVEL: CPT | Performed by: INTERNAL MEDICINE

## 2017-03-18 RX ORDER — MIRTAZAPINE 15 MG/1
15 TABLET, FILM COATED ORAL AT BEDTIME
Qty: 60 TABLET | Status: ON HOLD | DISCHARGE
Start: 2017-03-18 | End: 2017-04-08

## 2017-03-18 RX ORDER — LANOLIN ALCOHOL/MO/W.PET/CERES
3 CREAM (GRAM) TOPICAL
Status: ON HOLD | DISCHARGE
Start: 2017-03-18 | End: 2017-04-08

## 2017-03-18 RX ORDER — METOPROLOL TARTRATE 25 MG/1
75 TABLET, FILM COATED ORAL 2 TIMES DAILY
Status: ON HOLD | DISCHARGE
Start: 2017-03-18 | End: 2017-04-08

## 2017-03-18 RX ORDER — FUROSEMIDE 40 MG
40 TABLET ORAL DAILY
Qty: 30 TABLET | Status: ON HOLD | DISCHARGE
Start: 2017-03-18 | End: 2017-04-08

## 2017-03-18 RX ORDER — CLOPIDOGREL BISULFATE 75 MG/1
75 TABLET ORAL DAILY
Qty: 30 TABLET | Status: ON HOLD | DISCHARGE
Start: 2017-03-18 | End: 2017-04-08

## 2017-03-18 RX ORDER — CEFTRIAXONE 2 G/1
2 INJECTION, POWDER, FOR SOLUTION INTRAMUSCULAR; INTRAVENOUS EVERY 12 HOURS
Qty: 5 EACH | Refills: 0 | DISCHARGE
Start: 2017-03-18 | End: 2017-03-24

## 2017-03-18 RX ORDER — POTASSIUM CHLORIDE 1500 MG/1
20 TABLET, EXTENDED RELEASE ORAL DAILY
Qty: 90 TABLET | Status: ON HOLD | DISCHARGE
Start: 2017-03-18 | End: 2017-04-08

## 2017-03-18 RX ORDER — LISINOPRIL 10 MG/1
10 TABLET ORAL DAILY
Qty: 30 TABLET | Status: ON HOLD | DISCHARGE
Start: 2017-03-18 | End: 2017-04-08

## 2017-03-18 RX ADMIN — CEFTRIAXONE 2 G: 2 INJECTION, POWDER, FOR SOLUTION INTRAMUSCULAR; INTRAVENOUS at 09:00

## 2017-03-18 RX ADMIN — METOPROLOL TARTRATE 75 MG: 50 TABLET ORAL at 20:15

## 2017-03-18 RX ADMIN — FUROSEMIDE 40 MG: 40 TABLET ORAL at 09:01

## 2017-03-18 RX ADMIN — RANITIDINE HYDROCHLORIDE 150 MG: 150 TABLET, FILM COATED ORAL at 09:00

## 2017-03-18 RX ADMIN — ASPIRIN 325 MG ORAL TABLET 325 MG: 325 PILL ORAL at 09:00

## 2017-03-18 RX ADMIN — MELATONIN TAB 3 MG 3 MG: 3 TAB at 21:52

## 2017-03-18 RX ADMIN — VITAMIN D, TAB 1000IU (100/BT) 1000 UNITS: 25 TAB at 09:00

## 2017-03-18 RX ADMIN — METOPROLOL TARTRATE 75 MG: 50 TABLET ORAL at 09:17

## 2017-03-18 RX ADMIN — LISINOPRIL 10 MG: 10 TABLET ORAL at 09:00

## 2017-03-18 RX ADMIN — RANITIDINE HYDROCHLORIDE 150 MG: 150 TABLET, FILM COATED ORAL at 20:14

## 2017-03-18 RX ADMIN — OXYCODONE HYDROCHLORIDE AND ACETAMINOPHEN 500 MG: 500 TABLET ORAL at 09:00

## 2017-03-18 RX ADMIN — ATORVASTATIN CALCIUM 40 MG: 40 TABLET, FILM COATED ORAL at 09:17

## 2017-03-18 RX ADMIN — INSULIN ASPART 1 UNITS: 100 INJECTION, SOLUTION INTRAVENOUS; SUBCUTANEOUS at 09:04

## 2017-03-18 RX ADMIN — POTASSIUM CHLORIDE 20 MEQ: 750 TABLET, EXTENDED RELEASE ORAL at 09:00

## 2017-03-18 RX ADMIN — CEFTRIAXONE 2 G: 2 INJECTION, POWDER, FOR SOLUTION INTRAMUSCULAR; INTRAVENOUS at 20:15

## 2017-03-18 RX ADMIN — SODIUM CHLORIDE, PRESERVATIVE FREE 5 ML: 5 INJECTION INTRAVENOUS at 20:14

## 2017-03-18 RX ADMIN — METFORMIN HYDROCHLORIDE 500 MG: 500 TABLET ORAL at 17:56

## 2017-03-18 RX ADMIN — INSULIN GLARGINE 20 UNITS: 100 INJECTION, SOLUTION SUBCUTANEOUS at 09:04

## 2017-03-18 RX ADMIN — CLOPIDOGREL BISULFATE 75 MG: 75 TABLET, FILM COATED ORAL at 09:00

## 2017-03-18 RX ADMIN — TAMSULOSIN HYDROCHLORIDE 0.8 MG: 0.4 CAPSULE ORAL at 20:15

## 2017-03-18 RX ADMIN — MIRTAZAPINE 15 MG: 7.5 TABLET, FILM COATED ORAL at 20:14

## 2017-03-18 RX ADMIN — METFORMIN HYDROCHLORIDE 500 MG: 500 TABLET ORAL at 09:00

## 2017-03-18 NOTE — PLAN OF CARE
Problem: Goal/Outcome  Goal: Goal Outcome Summary  Outcome: No Change  FOCUS/GOAL  Mobility and Equipment/Assistive devices     ASSESSMENT, INTERVENTIONS AND CONTINUING PLAN FOR GOAL:  Pt a&ox4. Denied pain. Ao1 using sliding board or pivot transfer from wc<>bed. Pt requested the use of the lift to shower. New mepilex dressing placed on R buttock/ coccyx area. Plan is to DC Sunday to TCU. Continue with poc.

## 2017-03-18 NOTE — PLAN OF CARE
Problem: Goal/Outcome  Goal: Goal Outcome Summary  OT: Pt. Having dizziness during OT Session which patient reports usually subsides although lingered throughout session. Vitals taken and RN aware and notified MD.  Pt. Participated in full session.

## 2017-03-18 NOTE — PLAN OF CARE
Problem: Goal/Outcome  Goal: Goal Outcome Summary  OT: Session focused on endurance based exercise with increased fatigue per patient report and SOB. O2 sats 98% on RA and NE 85 following exercise. Pt. Denies dizziness this session although reports feels fatigued today.

## 2017-03-18 NOTE — PLAN OF CARE
Problem: Goal/Outcome  Goal: Goal Outcome Summary  Outcome: No Change  FOCUS/GOAL  Bladder management and Mobility     ASSESSMENT, INTERVENTIONS AND CONTINUING PLAN FOR GOAL:  Pt has been sleeping well tonight, and is able to reposition himself, or calls if he needs some help. Pt uses bedside urinal for voiding, and staff empties for him. Pt will be discharging to TCU tomorrow.

## 2017-03-18 NOTE — PROGRESS NOTES
[facility], [place]   Internal Medicine Daily Note          Assessment and Plan:    Tad Manning is a 76-year-old  gentleman transferred from the [facility] after T7-T12 posterior spinal segmental instrumentation and T9-T10 interbody fusion and posterior arthrodesis T70-T12 for a spinal abscess with osteomyelitis and diskitis.     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   Weekly CBC, CRP and LFT's. Labs on 3/17 with normal LFT's  CRP at 81 ( on 3/13 at 74.7)  Stable WBC and platelet counts .Followup with Infectious Disease, Dr. Gillian Max on 03/23/2017.     # DM Type 2 ( well controlled- A1c on 2/10/2017 @ 6.1)   Continue Lantus 20U , Metformin 500mg bid .Since patient is requiring very minimal amounts of short acting insulin, I will stop sliding scale. Increase Lantus to 22 units.     # Acute systolic CHF   Echo on 02/24 showed decreased LVEF and severe global hypokinesis. Evaluated by cardiology   Currently on medical treatment for heart failure with beta blocker ( metoprolol 75 mg BID)  , ACE inhibitor ( Lisinopril 10 mg daily) and diuresis   Given some lightheadedness and ortho stasis, Lasix reduced to 40 mg daily on 3/15 ( from 40 mg am and 20 mg pm). Scheduled potassium supplement at 20 meq initiated on 3/15  Continue with daily weights. Stable between 88-91 Kgs.  Cardiology f/u after d/c with repeat echo     #Tachycardia:    Atrial flutter with variable block , electrolytes stable .CHADSVASC score is 4 (CHF, HTN, Age, DM, vascular disease) and long term AC would be recommended; however risk vs benefit would need to closely evaluated. With history of frequent falls we would be concerned about head injury. I discussed with primary thompson as well, who feel that the risk of falls is high.  Was initiated on  Metoprolol 50 BID , increased to 75 mg BID on 3/18  In any case, patient is on aspirin 325 mg  and clopidogrel 75 mg   Issues of anticoagulation to be re discussed with cardiology as outpatient     Peripheral vascular disease, status post bilateral lower extremity stents:   Continue aspirin , Plavix( started 2/20)     # Chronic lymphocytic leukemia in remission, last chemo was 09/2015: Follow up with Oncology.   # Coronary artery disease: Continue aspirin 325 mg daily, Lipitor 40 mg daily, (Changed atenolol to Metoprolol 2/24).   # Spinal cord compression with lower extremity weakness: plan per Primary team            Anemia of Chronic disease:  Stable. Hgb at 8.6. No issues with lightheadedness or hypotension  Transfusion for Hgb <7    Consulting teams: Internal Medicine   Code status: Full   DVT Prophylaxis: Aspirin 325 mg   Gastric prophylaxis: H2 blocker   Diet: Moderate consistent carbohydrate diet   Disposition: To be transferred to TCU tomorrow      I have done medication reconciliation for patient in preparation for discharge of patient tomorrow   Follow up plan as follows:  On 3/23: follow up with ID ( Dr Jeannie Max)  On 5/1 : Follow up with Cardiology ( Dr Clarisse Valdes)  Follow up with Oncology as needed.    I will not plan on seeing patient tomorrow prior to discharge unless needed.            Interval History:   Progress notes in the last 24 hrs by MDT team has been reviewed.  Patient continues to feel well this morning.  Denies chest pain/ SOB  Denies fevers or chills  Eating and drinking well  Due to protracted path of progress with rehabilitation, patient has now been determined to be a TCU candidate [facility]        Review of Systems:   A comprehensive review of systems was performed and found to be negative except: Those that are outlined in interval history              Medications:   I have reviewed this patient's current medications which are  "outlined in the \"current medication\" section of [facility]             Physical Exam:   Vitals were reviewed  Temp: 96.9  F (36.1  C) Temp src: Oral BP: 132/73 Pulse: 130 Heart Rate: 83 Resp: 16 SpO2: 97 % O2 Device: None (Room air)    Constitutional:   awake, alert, cooperative, no apparent distress, and appears stated age     Lungs:   No increased work of breathing, good air exchange, clear to auscultation bilaterally, no crackles or wheezing     Cardiovascular:   Irregular heart sounds. Mild tachycardia. No murmurs. No peripheral edema      Abdomen:   No scars, normal bowel sounds, soft, non-distended, non-tender, no masses palpated, no hepatosplenomegally     Musculoskeletal:   no lower extremity pitting edema present. Back incision has healed well      Neurologic:   Awake, alert, oriented to name, place and time.  Cranial nerves II-XII are grossly intact.             Data:     Most recent labs have been evaluated and relevant labs are outlined below :    BMP    Recent Labs  Lab 03/17/17  0544 03/16/17  0532 03/15/17  0742 03/14/17  0703 03/13/17  0758 03/12/17  2123   *  --  143  --  143 142   POTASSIUM 3.7 3.5 3.6 3.5 3.5 3.0*   CHLORIDE 111*  --  108  --  109 106   JUSTINO 8.6  --  8.4*  --  8.1* 7.9*   CO2 26  --  24  --  24 26   BUN 16  --  18  --  14 14   CR 0.57*  --  0.66  --  0.57* 0.57*   *  --  146*  --  156* 194*     CBC    Recent Labs  Lab 03/17/17  0544 03/15/17  0742 03/13/17  0758 03/12/17  2123   WBC 5.1 5.6 5.4 5.9   RBC 3.21* 3.12* 3.21* 3.13*   HGB 8.6* 8.1* 8.7* 8.5*   HCT 29.0* 27.7* 28.7* 28.0*   MCV 90 89 89 90   MCH 26.8 26.0* 27.1 27.2   MCHC 29.7* 29.2* 30.3* 30.4*   RDW 16.7* 16.9* 17.0* 17.2*    173 180 175     INRNo lab results found in last 7 days.  LFTs    Recent Labs  Lab 03/17/17  0544 03/15/17  0742 03/13/17  0758   ALKPHOS 140 138 163*   AST 13 20 12   ALT 25 24 23   BILITOTAL 0.3  --  0.5   PROTTOTAL 5.5* 5.4* 5.4*   ALBUMIN 2.2* 2.1* 2.1*      PANCNo lab results " found in last 7 days.          Dr NIRANJAN Schaefer MD  Hospitalist ( Internal medicine)  Pager: [phone]

## 2017-03-18 NOTE — PLAN OF CARE
Problem: Goal/Outcome  Goal: Goal Outcome Summary  PT: Pt seen for independence day. Pt requires SBA for set up for slide board transfers. Pt performed 2x STS in // w/ BLE knee blocking provided, tolerates ~30 seconds. Pt engaged in bed mobility, min A sit > supine; SBA supine > sit. Performed SLR supine to increase hip flexor strength

## 2017-03-18 NOTE — PLAN OF CARE
Problem: Goal/Outcome  Goal: Goal Outcome Summary  Outcome: No Change  Patient is alert and oriented and able to make needs known. He utilizes call light appropriately. Patient has been continent of bowel and bladder and did have a bowel movement this shift. New medication orders noted. Lantus increased to 22u and sliding scale Novolog discontinued. Blood glucose levels 146 and 214. Patient has been tachycardic and his Metoprolol was increased to 75mg. Writer was called to patient's room and he reported dizziness. Patient's blood pressure at that time was 146/85 and pulse was 124. Client at baseline neurologically. Writer reported symptoms to provider and staff are to monitor patient. Patient does wear bilateral teds and abdominal binder. Patient transfers with assist of one staff and sliding board. Patient to discharge tomorrow to TCU.  is scheduled for 1300.

## 2017-03-19 VITALS
WEIGHT: 196 LBS | SYSTOLIC BLOOD PRESSURE: 151 MMHG | DIASTOLIC BLOOD PRESSURE: 81 MMHG | TEMPERATURE: 96.7 F | HEIGHT: 72 IN | OXYGEN SATURATION: 97 % | BODY MASS INDEX: 26.55 KG/M2 | RESPIRATION RATE: 16 BRPM | HEART RATE: 103 BPM

## 2017-03-19 LAB
GLUCOSE BLDC GLUCOMTR-MCNC: 154 MG/DL (ref 70–99)
GLUCOSE BLDC GLUCOMTR-MCNC: 193 MG/DL (ref 70–99)

## 2017-03-19 PROCEDURE — 97110 THERAPEUTIC EXERCISES: CPT | Mod: GP

## 2017-03-19 PROCEDURE — A9270 NON-COVERED ITEM OR SERVICE: HCPCS | Mod: GY | Performed by: INTERNAL MEDICINE

## 2017-03-19 PROCEDURE — 25000132 ZZH RX MED GY IP 250 OP 250 PS 637: Mod: GY | Performed by: INTERNAL MEDICINE

## 2017-03-19 PROCEDURE — 00000146 ZZHCL STATISTIC GLUCOSE BY METER IP

## 2017-03-19 PROCEDURE — 40000193 ZZH STATISTIC PT WARD VISIT

## 2017-03-19 PROCEDURE — 25000128 H RX IP 250 OP 636: Performed by: INTERNAL MEDICINE

## 2017-03-19 RX ADMIN — ASPIRIN 325 MG ORAL TABLET 325 MG: 325 PILL ORAL at 08:35

## 2017-03-19 RX ADMIN — RANITIDINE HYDROCHLORIDE 150 MG: 150 TABLET, FILM COATED ORAL at 08:30

## 2017-03-19 RX ADMIN — OXYCODONE HYDROCHLORIDE AND ACETAMINOPHEN 500 MG: 500 TABLET ORAL at 08:30

## 2017-03-19 RX ADMIN — METOPROLOL TARTRATE 75 MG: 50 TABLET ORAL at 08:30

## 2017-03-19 RX ADMIN — FUROSEMIDE 40 MG: 40 TABLET ORAL at 08:30

## 2017-03-19 RX ADMIN — VITAMIN D, TAB 1000IU (100/BT) 1000 UNITS: 25 TAB at 08:30

## 2017-03-19 RX ADMIN — CLOPIDOGREL BISULFATE 75 MG: 75 TABLET, FILM COATED ORAL at 08:35

## 2017-03-19 RX ADMIN — ATORVASTATIN CALCIUM 40 MG: 40 TABLET, FILM COATED ORAL at 08:30

## 2017-03-19 RX ADMIN — METFORMIN HYDROCHLORIDE 500 MG: 500 TABLET ORAL at 08:30

## 2017-03-19 RX ADMIN — LISINOPRIL 10 MG: 10 TABLET ORAL at 08:32

## 2017-03-19 RX ADMIN — POTASSIUM CHLORIDE 20 MEQ: 750 TABLET, EXTENDED RELEASE ORAL at 08:34

## 2017-03-19 RX ADMIN — CEFTRIAXONE 2 G: 2 INJECTION, POWDER, FOR SOLUTION INTRAMUSCULAR; INTRAVENOUS at 08:25

## 2017-03-19 NOTE — PROGRESS NOTES
PM&R Daily Note:    Pulling together details for discharge to TCU tomorrow. Orders and d/c summary done.  Reviewed with hospitalist  Will stop SS / carb count aspart as was getting very low doses of that. Lantus increased to 22 units daily.  Outlined for f/u with IF 3/23 and consideration for stop of IV abx. >>20>>74>>81. No leukocytosis or fever.  Weights stable with lower furosemide dose, had been 40+20, now just 40.  Na up slightly, should trend down with bit better hydration.  Still with some variable block of a flutter with tachycardia. Metoprolol increased to 75 mg BID. Monitor for hypotension and HR.      Vitals:    03/18/17 1056 03/18/17 1345 03/18/17 1558 03/18/17 2015   BP: 146/85  116/63 134/74   BP Location: Left arm  Left arm    Pulse: 131  74    Resp:   16    Temp:   95.6  F (35.3  C)    TempSrc:   Oral    SpO2:  98% 97%    Weight:       Height:         Alert, conversant  No diaphoresis  Heart tachy  Lungs clear  LE's with 1+ edema  Strength slightly weaker right leg than left  Hip flexions with 3, knee ext, ADF & EHL 4's, APF 5-.    Coordination of care:  30 minutes  Face-to-face:              10 minutes  Total time:                  40 minutes

## 2017-03-19 NOTE — PLAN OF CARE
Problem: Goal/Outcome  Goal: Goal Outcome Summary  Outcome: No Change  FOCUS/GOAL  Medication management, Wound care management and Discharge planning     ASSESSMENT, INTERVENTIONS AND CONTINUING PLAN FOR GOAL:  Pt a&ox4. Denied pain. Plan is to DC to TCU tomorrow at 1300. Mepilex on coccyx changed due to being soiled with BM. Utilized urinal while in bed-staff to empty. SS insulin discontinued.  & 151. Requested Melatonin at hs. Continue with poc.

## 2017-03-19 NOTE — DISCHARGE INSTRUCTIONS
TCU provider while there. Should have weekly CBC, CRP and BMP for 3 more weeks to assure infection and electrolyte stability.    Follow-Up Appointments:    -- Follow up with ID and cardiology as scheduled 3/23 and 5/1. Exploring if the ID appointment could be postponed or decisions made with labs alone. Message pending with Dr. Max.    -- Neurosurgery Dr. Francisco J Trent, f/u surgery in 3-4 weeks - needs scheduling.    -- Dr. Hyun Koehler in [facility] after TCU discharge as needed for additional rehabilitation needs if not addressed by TCU therapy team.  Primary provider after TCU discharge.

## 2017-03-19 NOTE — PLAN OF CARE
Problem: Goal/Outcome  Goal: Goal Outcome Summary  Outcome: No Change  FOCUS/GOAL  Bladder management and Mobility     ASSESSMENT, INTERVENTIONS AND CONTINUING PLAN FOR GOAL:  Pt has been sleeping well through the night, and is able to reposition himself in bed, or calls for assistance. He uses bedside urinal for voiding and staff empties. Pt will be discharging to TCU today, with [facility]  at 1:00 PM.

## 2017-03-19 NOTE — DISCHARGE SUMMARY
DATE OF ADMISSION:  02/15/2017       DATE OF DISCHARGE:  03/19/2017      DISCHARGE DIAGNOSES:   1.  Nontraumatic spinal cord injury with paraparesis.   2.  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   3.  Impaired mobility and activities of daily living related to above.   4.  Mild impaired cognition, unclear etiology.   5.  Type 2 diabetes mellitus.   6.  Acute systolic congestive heart failure with severe global hypokinesis.   7.  Atrial flutter with variable block and tachycardia.   8.  Coronary artery disease with past stenting and coronary artery bypass grafting.   9.  Peripheral vascular disease with historical stents and femoral popliteal bypass.   10.  Hypertension.   11.  Hyponatremia, subsequent hypernatremia.   12.  History of chronic lymphocytic leukemia in remission.   13.  Anemia of chronic disease.   14.  Sacral moisture-associated dermatitis, improving.   15.  Friction wounds, bilateral buttocks, left side resolved, right side nearly resolved.   16.  Adjustment disorder with anxiety and insomnia.      HISTORY OF PRESENT ILLNESS:  Tad Vega is a 76-year-old with history of considerable coronary artery and peripheral artery disease, diabetes and hypertension who presented initially to [facility] 01/20 with lower extremity weakness and syncopal episodes felt to be linked with orthostatic hypotension and acute renal failure.  Discharged home on the 25th, presented back 02/07 and he was identified with thoracic diskitis and paraspinal abscess with spinal cord impingement.  He transferred to [facility] 02/09 for further workup and management.  Tad underwent surgical decompression and fusion, procedure as above on 02/12.  Before surgery, he had almost complete leg paralysis.  Post-surgically, he started to have some functional  movement return.  He was started on broad-spectrum antibiotics.  Cultures subsequently grew moderate Streptococcus mitis with various anaerobic species as well.  Antibiotics narrowed to intravenous ceftriaxone and a PICC line was placed.  After stabilization, he transferred to the inpatient rehab center, [facility] on 02/15 for comprehensive cares.      REHABILITATION COURSE:   1.  Tad has participated in physical and occupational therapy as well as rehab nursing and close management by Physiatry.  Also consultation with Hospitalist Service, Cardiology and Health Psychology.  Tad has made some improvements in strength in his lower extremities but not sufficient enough yet to discharge home.  He is therefore discharging to a transitional care unit for additional therapy and recovery time.   2.  From a mobility standpoint, Tad has improved strength.  Initially on admission to acute rehabilitation, hip flexion 1's with more distally strength 2 to 3's.  Currently, hip flexion 3, more distally, knees, ankles solid 4's.  This, however, is not stable enough for standing without assistance with knee blocking.  He is working with slide board transfers, though not fully independent with that yet.  He is predominantly wheelchair based mobility and with this he is able to push roughly 250 feet on level surfaces.  Also significantly limiting Tad in function is cardiac heart failure with reduced endurance.  During the acute rehabilitation there was concern of worsening cardiac function with new atrial flutter.  Seen by Cardiology and echocardiogram did identify some global hypokinesis.  His heart rates with this new atrial flutter had variable block, sometimes tachycardia, roughly 130s.  He was recommended to manage medically with beta blockade.  Given this along with a CHADS-VASc score of 4, I would traditionally recommend longer term stronger anticoagulation with warfarin.   However, given some of his fall risk and in turn bleed risk he was continued on aspirin and Plavix only.  Diuresis has been adjusted down with some worsened lower pressures and tachycardia on higher dose. Presently 40 mg daily. He is recommended to follow up with Cardiology, currently outlined 05/01 with Dr. Valdes though could be sooner if symptoms worsen.   3.  For infectious disease, Tad continues on IV ceftriaxone, this is outlined to continue until 03/23, follow up with Infectious Disease, Dr. Max. Exploring if this appointment is mandatory or if decision can be made with labs and our reported clinical exam. He has remained afebrile with normal white count.  Tracking weekly labs, there is an overall decrease in his CRP from 128; however, it had a mio around 20 and has subsequently gone up a bit to 81.  Without other clinical signs of infection, we are maintaining the current antibiotic regimen.   4.  Diabetes was managed during his stay and adjustments to his insulin.  He has been needing very little carb coverage, short-acting aspart so will go with long-acting glargine only.  He also has oral metformin.   5.  Tad has had some redness on his sacrum and buttocks.  Some of this is felt to be moisture related and improved.  There is also some friction redness and flaking of the skin, left buttock resolved, right buttock improved, still covered with Mepilex dressing every other day.   6.  Psychosocially Tad has had 1-month stay on acute rehab and has shown some above outlined progress; however, it is looking as if he will need wheelchair base for much of his primary mobility for some time.  Did do a home safety assessment and there are several physical barriers including carpet, narrow access into the bathroom and showers.  He does have a significant other, but not consistently available to provide the type of support, especially with personal cares.  Tad may do very well at an assisted living  facility and that may be eventual need.  He is really hoping to build on at least some short distance ambulation which would facilitate return to his prior living situation.      DISCHARGE MEDICATIONS:   1.  Clopidogrel 75 mg daily.   2.  Furosemide 40 mg daily.   3.  Lisinopril 10 mg daily.   4.  Melatonin 3 mg at bedtime.   5.  Metformin 500 mg twice daily.   6.  Metoprolol 75 mg twice daily.   7.  Mirtazapine 15 mg at bedtime.   8.  Potassium chloride sustained action 20 mEq daily.   9.  Ranitidine 150 mg twice daily.   10.  Insulin glargine 22 units subcutaneous every morning.   11.  Acetaminophen 650 mg every 4 hours as needed for pain.   12.  Aspirin 325 mg daily.   13.  Atorvastatin 40 mg daily.   14.  Ceftriaxone 2 g intravenous q.12 h.  Continue at least through infectious disease, followup on 03/23.   15.  Nitroglycerin sublingual tablet 0.4 mg under the tongue as needed per protocol for angina.   16.  Polyethylene glycol 17 g daily as needed for constipation.   17.  Senna docusate 1-2 tablets twice daily as needed for constipation.   18.  Tamsulosin 0.8 mg at bedtime for benign prostatic hypertrophy.   19.  Vitamin C 500 mg daily for supplementation.   20.  Vitamin D 1000 units daily for supplementation.      DISCHARGE ORDERS AND RECOMMENDATIONS:  Diet is diabetic, moderate consistent carbohydrate.  Regular consistency with thin liquids.  Have been offering Ensure Clear or Ensure Plus with meals to augment nutrition.  Recommend daily weights, tracking shift of more than 2 pounds per day or 5 pounds in a week, to primary provider.  Encourage oral hydration.  Blood glucose checks twice daily.  Nursing facility standing orders and Mantoux per policy.  Wound care back incision has healed well.  Monitor for signs of infection.  Peritoneum, apply Mahsa Cleanse with any incontinent, then apply a thin layer of Critic-Aid paste around rectum, though not onto buttocks.  For buttocks, apply Sween 24 or equivalent  moisturizing lotion to dry skin.  Right ischial tuberosity area friction sore, clean every other day with Mahsa Cleanse then cover with Mepilex dressing.  Also daily apply lotion to calluses and dry skin on feet.  Should continue with physical and occupational therapies.  Activity, avoid bending and twisting through thoracic spine.  No longer has weight lifting restrictions.  Transfers with assistance only.      FOLLOWUP APPOINTMENTS:  With facility based providers for medical issues while there and follow up with primary provider, Dr. Gene Guevara 1-2 weeks after TCU discharge.  Follow up with Infectious Disease specialist, Dr. Gillian Max  at 8:00 a.m., Exploring if this appointment is mandatory or if decision can be made with labs and our reported clinical exam. with Cardiology, Dr. Clarisse Valdes  at 11:00 a.m. Neurosurgery Dr. Francisco J Trent, f/u surgery in 3-4 weeks. Can see Dr. Hyun Koehler in Rehabilitation Medicine Clinic after TCU discharge as needed if rehabilitation needs persist.      Greater than 30 minutes spent with discharge.         HAKEEM MCKEON MD             D: 2017 22:46   T: 2017 08:54   MT: katrin      Name:     PETER BUTLER   MRN:      [id number]        Account:        [id number]   :      1940           Admit Date:                                       Discharge Date:       Document: [id number]       cc: Gillian Valdes MD

## 2017-03-19 NOTE — PLAN OF CARE
Problem: Goal/Outcome  Goal: Goal Outcome Summary  PT: Pt w/ increased resting HR; RN approved session to include in room supine exercise and seated exercise. Pt w/ O2 dropped to 89% when performing heel slides supine. Placed on 2L O2; increased to 95%. HR increased to 127. Frequent rest breaks taken w/ focus on PLB technique. Bld on alysia noted, RN informed         Physical Therapy Discharge Summary    Reason for therapy discharge:    Discharged to transitional care facility.    Progress towards therapy goal(s). See goals on Care Plan in [facility] electronic health record for goal details.  Goals partially met.  Barriers to achieving goals:   discharge from facility.    Therapy recommendation(s):    Continued therapy is recommended.  Rationale/Recommendations:  To improve LE strength, standing tolerance, gait when appropriate, transfers for improved overall functional mobility .

## 2017-03-19 NOTE — PROGRESS NOTES
OK to d/c to TCU today.  Will need f/u with ID scheduled for 3/23 as listed on AVS / IATF.  Explore if this can be postponed, may be difficult to get to 7:40 AM appointment from a TCU.  Message sent by Dr. Schaefer to Dr. Max.  Full d/c summary dictated    /81 (BP Location: Left arm)  Pulse 103  Temp 96.7  F (35.9  C) (Oral)  Resp 16  Ht 1.829 m (6')  Wt 88.9 kg (196 lb)  SpO2 97%  BMI 26.58 kg/m2

## 2017-03-23 ENCOUNTER — OFFICE VISIT (OUTPATIENT)
Dept: INFECTIOUS DISEASES | Facility: CLINIC | Age: 77
End: 2017-03-23
Attending: INTERNAL MEDICINE
Payer: MEDICARE

## 2017-03-23 VITALS — DIASTOLIC BLOOD PRESSURE: 66 MMHG | SYSTOLIC BLOOD PRESSURE: 122 MMHG | TEMPERATURE: 97.4 F | HEART RATE: 88 BPM

## 2017-03-23 DIAGNOSIS — Z87.898 HISTORY OF BACTEREMIA: ICD-10-CM

## 2017-03-23 DIAGNOSIS — G06.2 EPIDURAL ABSCESS: Primary | ICD-10-CM

## 2017-03-23 PROCEDURE — 99213 OFFICE O/P EST LOW 20 MIN: CPT | Mod: ZF

## 2017-03-23 RX ORDER — NYSTATIN 100000 [USP'U]/G
POWDER TOPICAL
Status: ON HOLD | COMMUNITY
End: 2017-04-08

## 2017-03-23 ASSESSMENT — PAIN SCALES - GENERAL: PAINLEVEL: MODERATE PAIN (4)

## 2017-03-23 NOTE — MR AVS SNAPSHOT
After Visit Summary   3/23/2017    Tad Manning    MRN: [id number]           Patient Information     Date Of Birth          1940        Visit Information        Provider Department      3/23/2017 8:00 AM Gillian Max MD [facility] and Infectious Diseases        Care Instructions    Mr. Manning is improving slowly in strength. He continues to have episodes of shortness of breath but they are stable. He is scheduled to see cardiology on 5/1. His CRP has been increasing recently without clear cause. We will continue ceftriaxone, add metronidazole to optimally cover the anaerobes that grew late from his operative cultures, and re-assess in 3 weeks.     Plan:   1. Continue ceftriaxone through 4/13  2. Start metronidazole 500 mg PO TID x 3 weeks. Will re-evaluate need for it based on repeat labs in 1 week. If significant decreased appetite, bad taste in mouth, confusion or new tingling of fingers or toes occurs after starting metronidazole, discontinue and call the infectious diseases clinic at [phone].   3. Please check CRP, CBC, and CMP in 1 week and fax to Dr. Max at [phone]. I will call TCU and Mr. Manning/family with new plan on 4/3/17.   4. Please ensure that neurosurgery follow-up appointment is scheduled.   5. Return to ID clinic on 4/13/17 at 11:00 AM        Follow-ups after your visit        Your next 10 appointments already scheduled     Apr 13, 2017 11:00 AM CDT   (Arrive by 10:45 AM)   Return Visit with Gillian Max MD   [facility] and Infectious Diseases ([facility])    [address] [place] [id number] [phone]            May 01, 2017 11:00 AM CDT   (Arrive by 10:45 AM)   Return Visit with Clarisse Valdes MD   [facility])    [address] [place] [id number] [phone]              Who to contact      "If you have questions or need follow up information about today's clinic visit or your schedule please contact [facility] AND INFECTIOUS DISEASES directly at [phone].  Normal or non-critical lab and imaging results will be communicated to you by MyChart, letter or phone within 4 business days after the clinic has received the results. If you do not hear from us within 7 days, please contact the clinic through [place] or phone. If you have a critical or abnormal lab result, we will notify you by phone as soon as possible.  Submit refill requests through [email or web address] or call your pharmacy and they will forward the refill request to us. Please allow 3 business days for your refill to be completed.          Additional Information About Your Visit        Care.com Information     [email or web address] lets you send messages to your doctor, view your test results, renew your prescriptions, schedule appointments and more. To sign up, go to [email or web address] . Click on \"Log in\" on the left side of the screen, which will take you to the Welcome page. Then click on \"Sign up Now\" on the right side of the page.     You will be asked to enter the access code listed below, as well as some personal information. Please follow the directions to create your username and password.     Your access code is: [email or web address]  Expires: 2017  5:04 PM     Your access code will  in 90 days. If you need help or a new code, please call your [place] clinic or [phone].        Care EveryWhere ID     This is your Care EveryWhere ID. This could be used by other organizations to access your [place] medical records  [id number]        Your Vitals Were     Pulse Temperature                88 97.4  F (36.3  C) (Oral)           Blood Pressure from Last 3 Encounters:   17 122/66   17 151/81   02/15/17 122/64    Weight from Last 3 Encounters:   17 88.9 kg (196 lb)   17 81.2 kg (179 lb)   17 85.7 kg (188 lb " 15 oz)              Today, you had the following     No orders found for display       Primary Care Provider Office Phone # Fax #    Gene Guevara [phone]       [address] [address]        Thank you!     Thank you for choosing [facility] AND INFECTIOUS DISEASES  for your care. Our goal is always to provide you with excellent care. Hearing back from our patients is one way we can continue to improve our services. Please take a few minutes to complete the written survey that you may receive in the mail after your visit with us. Thank you!             Your Updated Medication List - Protect others around you: Learn how to safely use, store and throw away your medicines at www.disposemymeds.org.          This list is accurate as of: 3/23/17  9:10 AM.  Always use your most recent med list.                   Brand Name Dispense Instructions for use    acetaminophen 325 MG tablet    TYLENOL    100 tablet    Take 2 tablets (650 mg) by mouth every 4 hours as needed for other (surgical pain)       aspirin 325 MG tablet     120 tablet    Take 1 tablet (325 mg) by mouth daily       atorvastatin 40 MG tablet    LIPITOR    30 tablet    Take 1 tablet by mouth daily.       cefTRIAXone 2 GM vial    ROCEPHIN    5 each    Inject 2 g into the vein every 12 hours for 11 doses       clopidogrel 75 MG tablet    PLAVIX    30 tablet    Take 1 tablet (75 mg) by mouth daily       furosemide 40 MG tablet    LASIX    30 tablet    Take 1 tablet (40 mg) by mouth daily       insulin glargine 100 UNIT/ML injection    LANTUS    30 mL    Inject 22 Units Subcutaneous every morning (before breakfast)       lisinopril 10 MG tablet    PRINIVIL/ZESTRIL    30 tablet    Take 1 tablet (10 mg) by mouth daily       melatonin 3 MG tablet      Take 1 tablet (3 mg) by mouth nightly as needed       metFORMIN 500 MG tablet    GLUCOPHAGE    60 tablet    Take 1 tablet (500 mg) by mouth 2  times daily (with meals)       metoprolol 25 MG tablet    LOPRESSOR     Take 3 tablets (75 mg) by mouth 2 times daily       mirtazapine 15 MG tablet    REMERON    60 tablet    Take 1 tablet (15 mg) by mouth At Bedtime       NITROQUICK SL      Place 0.4 mg under the tongue as needed.       nystatin 690637 UNIT/GM Powd    MYCOSTATIN         order for DME     1 Device    Equipment being ordered: DH2 shoe       polyethylene glycol Packet    MIRALAX/GLYCOLAX    7 packet    Take 17 g by mouth daily as needed for constipation       potassium chloride SA 20 MEQ CR tablet    K-DUR/KLOR-CON M    90 tablet    Take 1 tablet (20 mEq) by mouth daily       ranitidine 150 MG tablet    ZANTAC    60 tablet    Take 1 tablet (150 mg) by mouth 2 times daily       senna-docusate 8.6-50 MG per tablet    SENOKOT-S;PERICOLACE    100 tablet    Take 1-2 tablets by mouth 2 times daily as needed (constipation )       tamsulosin 0.4 MG capsule    FLOMAX     Take 0.8 mg by mouth At Bedtime       VITAMIN C PO      Take 500 mg by mouth daily.       VITAMIN D PO      Take 1,000 Units by mouth daily.

## 2017-03-23 NOTE — PATIENT INSTRUCTIONS
Mr. Manning is improving slowly in strength. He continues to have episodes of shortness of breath but they are stable. He is scheduled to see cardiology on 5/1. His CRP has been increasing recently without clear cause. We will continue ceftriaxone, add metronidazole to optimally cover the anaerobes that grew late from his operative cultures, and re-assess in 3 weeks.     Plan:   1. Continue ceftriaxone through 4/13  2. Start metronidazole 500 mg PO TID x 3 weeks. Will re-evaluate need for it based on repeat labs in 1 week. If significant decreased appetite, bad taste in mouth, confusion or new tingling of fingers or toes occurs after starting metronidazole, discontinue and call the infectious diseases clinic at [phone].   3. Please check CRP, CBC, and CMP in 1 week and fax to Dr. Max at [phone].   4. Please ensure that neurosurgery follow-up appointment is scheduled.   5. Return to ID clinic on 4/13/17 at 11:00 AM

## 2017-03-23 NOTE — NURSING NOTE
Chief Complaint   Patient presents with     RECHECK     follow up with hospital visit, bhargavi kelley       Initial /66  Pulse 88  Temp 97.4  F (36.3  C) (Oral) Estimated body mass index is 26.58 kg/(m^2) as calculated from the following:    Height as of 2/15/17: 1.829 m (6').    Weight as of 3/17/17: 88.9 kg (196 lb).  Medication Reconciliation: complete

## 2017-03-23 NOTE — Clinical Note
3/23/2017       RE: Tad Manning  [address]   [facility] [address]     Dear Colleague,    Thank you for referring your patient, Tad Manning, to the [facility] AND INFECTIOUS DISEASES at [facility]. Please see a copy of my visit note below.    [facility] Follow-UP Visit  3/23/2017     Chief Complaint: Vertebral osteomyelitis     HPI:  Mr. Manning is a 75 yo gentleman with PMH CLL in remission, DMII, PVD admitted as transfer 2/9/2017 with thoracic back pain and LE weakness - found to have T9-10 discitis with overlying epidural abscess, now s/p laminectomy/abscess evacuation with intraoperative cultures and  blood cultures showing Streptococcus mitis (oral himanshu). He then also had late growth (day 6) on one operative cultures of mixed anaerobes as well.  TTE was negative for endocarditis. He was started on ceftriaxone Q12H with plans for 6-12 weeks of treatment. He has now had 6 weeks and is slowly regaining his leg strength but had increasing CRP just prior to discharge from the [place] [facility] to a TCU. He currently has some rhinorrhea but no other new onset symptoms.  No problems with PICC line. No new fevers. No new rashes. Incision healed well, though is still somewhat sore. No significant diarrhea.     ROS: Complete 12-point ROS is negative except as noted above.    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)      Coronary artery disease     triple bypass 1995     Diabetes mellitus (H)      Diabetic polyneuropathy (H)      History of blood transfusion      Hyperlipidaemia      Hypertension      Malignant neoplasm (H)     CLL     Noninflammatory pericardial effusion       Osteomyelitis of left foot (H)      PVD (peripheral vascular disease) (H)      Sebaceous cyst      Medications:  Current Outpatient Prescriptions   Medication Sig Dispense Refill     nystatin (MYCOSTATIN) 621462 UNIT/GM POWD        melatonin 3 MG tablet Take 1 tablet (3 mg) by mouth nightly as needed       mirtazapine (REMERON) 15 MG tablet Take 1 tablet (15 mg) by mouth At Bedtime 60 tablet      insulin glargine (LANTUS) 100 UNIT/ML injection Inject 22 Units Subcutaneous every morning (before breakfast) 30 mL 0     metFORMIN (GLUCOPHAGE) 500 MG tablet Take 1 tablet (500 mg) by mouth 2 times daily (with meals) 60 tablet      lisinopril (PRINIVIL/ZESTRIL) 10 MG tablet Take 1 tablet (10 mg) by mouth daily 30 tablet      metoprolol (LOPRESSOR) 25 MG tablet Take 3 tablets (75 mg) by mouth 2 times daily       cefTRIAXone (ROCEPHIN) 2 GM vial Inject 2 g into the vein every 12 hours for 11 doses 5 each 0     furosemide (LASIX) 40 MG tablet Take 1 tablet (40 mg) by mouth daily 30 tablet      potassium chloride SA (K-DUR/KLOR-CON M) 20 MEQ CR tablet Take 1 tablet (20 mEq) by mouth daily 90 tablet      clopidogrel (PLAVIX) 75 MG tablet Take 1 tablet (75 mg) by mouth daily 30 tablet      ranitidine (ZANTAC) 150 MG tablet Take 1 tablet (150 mg) by mouth 2 times daily 60 tablet      acetaminophen (TYLENOL) 325 MG tablet Take 2 tablets (650 mg) by mouth every 4 hours as needed for other (surgical pain) 100 tablet 0     aspirin 325 MG tablet Take 1 tablet (325 mg) by mouth daily 120 tablet 0     polyethylene glycol (MIRALAX/GLYCOLAX) Packet Take 17 g by mouth daily as needed for constipation 7 packet 0     senna-docusate (SENOKOT-S;PERICOLACE) 8.6-50 MG per tablet Take 1-2 tablets by mouth 2 times daily as needed (constipation ) 100 tablet 0     order for DME Equipment being ordered: DH2 shoe 1 Device 0     tamsulosin (FLOMAX) 0.4 MG 24 hr capsule Take 0.8 mg by mouth At Bedtime        Cholecalciferol (VITAMIN D PO) Take  1,000 Units by mouth daily.        atorvastatin (LIPITOR) 40 MG tablet Take 1 tablet by mouth daily. 30 tablet 1     Ascorbic Acid (VITAMIN C PO) Take 500 mg by mouth daily.       Nitroglycerin (NITROQUICK SL) Place 0.4 mg under the tongue as needed.       Exam:  B/P: 122/66, T: 97.4, P: 88,   Gen: Alert and in no distress.   Psych: Normal affect. Alert and oriented.   HEENT: PERRL. No icterus. Oropharynx pink and moist without lesions.   Neck: No lymphadenopathy.   CV: Irregularly irregular. Normal rate.   Chest: Clear to auscultation bilaterally without wheezes or crackles.   Back: Midline incision well healed. No significant tenderness over spine.   Abdomen: Soft, non-distended. Non-tender. Normal bowel sounds.   Extremities: Mild peripheral edema.   Skin: No rashes or lesions noted.     Labs:  WBC   Date Value Ref Range Status   03/17/2017 5.1 4.0 - 11.0 10e9/L Final     CRP Inflammation   Date Value Ref Range Status   03/15/2017 81.0 (H) 0.0 - 8.0 mg/L Final   03/13/2017 74.7 (H) 0.0 - 8.0 mg/L Final   03/06/2017 19.9 (H) 0.0 - 8.0 mg/L Final   02/24/2017 27.6 (H) 0.0 - 8.0 mg/L Final   02/20/2017 127.0 (H) 0.0 - 8.0 mg/L Final      Assessment and Plan:  Mr. Manning is improving slowly in strength. He continues to have episodes of shortness of breath but they are stable. He is scheduled to see cardiology on 5/1. His CRP has been increasing recently without clear cause. We will continue ceftriaxone, add metronidazole to optimally cover the anaerobes that grew late from his operative cultures, and re-assess in 3 weeks.     Plan:   1. Continue ceftriaxone through 4/13  2. Start metronidazole 500 mg PO TID x 3 weeks. Will re-evaluate need for it based on repeat labs in 1 week. If significant decreased appetite, bad taste in mouth, confusion or new tingling of fingers or toes occurs after starting metronidazole, discontinue and call the infectious diseases clinic at [phone].   3. Please check CRP, CBC, and CMP  today and in 1 week and fax to Dr. Max at [phone]. I will call TCU and Mr. Manning/family with new plan on 4/3/17.   4. Please ensure that neurosurgery follow-up appointment is scheduled.   5. Return to ID clinic on 4/13/17 at 11:00 AM    Gillian Max MD  Infectious Diseases  [phone]     Again, thank you for allowing me to participate in the care of your patient.      Sincerely,    Gillian Max MD

## 2017-03-23 NOTE — PROGRESS NOTES
[facility] Follow-UP Visit  3/23/2017     Chief Complaint: Vertebral osteomyelitis     HPI:  Mr. Manning is a 77 yo gentleman with PMH CLL in remission, DMII, PVD admitted as transfer 2/9/2017 with thoracic back pain and LE weakness - found to have T9-10 discitis with overlying epidural abscess, now s/p laminectomy/abscess evacuation with intraoperative cultures and  blood cultures showing Streptococcus mitis (oral himanshu). He then also had late growth (day 6) on one operative cultures of mixed anaerobes as well.  TTE was negative for endocarditis. He was started on ceftriaxone Q12H with plans for 6-12 weeks of treatment. He has now had 6 weeks and is slowly regaining his leg strength but had increasing CRP just prior to discharge from the [place] [facility] to a TCU. He currently has some rhinorrhea but no other new onset symptoms.  No problems with PICC line. No new fevers. No new rashes. Incision healed well, though is still somewhat sore. No significant diarrhea.     ROS: Complete 12-point ROS is negative except as noted above.    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)      Coronary artery disease     triple bypass 1995     Diabetes mellitus (H)      Diabetic polyneuropathy (H)      History of blood transfusion      Hyperlipidaemia      Hypertension      Malignant neoplasm (H)     CLL     Noninflammatory pericardial effusion      Osteomyelitis of left foot (H)      PVD (peripheral vascular disease) (H)      Sebaceous cyst      Medications:  Current Outpatient Prescriptions   Medication Sig Dispense Refill     nystatin (MYCOSTATIN) 858539 UNIT/GM POWD        melatonin 3 MG tablet Take 1 tablet (3 mg) by mouth nightly as needed       mirtazapine  (REMERON) 15 MG tablet Take 1 tablet (15 mg) by mouth At Bedtime 60 tablet      insulin glargine (LANTUS) 100 UNIT/ML injection Inject 22 Units Subcutaneous every morning (before breakfast) 30 mL 0     metFORMIN (GLUCOPHAGE) 500 MG tablet Take 1 tablet (500 mg) by mouth 2 times daily (with meals) 60 tablet      lisinopril (PRINIVIL/ZESTRIL) 10 MG tablet Take 1 tablet (10 mg) by mouth daily 30 tablet      metoprolol (LOPRESSOR) 25 MG tablet Take 3 tablets (75 mg) by mouth 2 times daily       cefTRIAXone (ROCEPHIN) 2 GM vial Inject 2 g into the vein every 12 hours for 11 doses 5 each 0     furosemide (LASIX) 40 MG tablet Take 1 tablet (40 mg) by mouth daily 30 tablet      potassium chloride SA (K-DUR/KLOR-CON M) 20 MEQ CR tablet Take 1 tablet (20 mEq) by mouth daily 90 tablet      clopidogrel (PLAVIX) 75 MG tablet Take 1 tablet (75 mg) by mouth daily 30 tablet      ranitidine (ZANTAC) 150 MG tablet Take 1 tablet (150 mg) by mouth 2 times daily 60 tablet      acetaminophen (TYLENOL) 325 MG tablet Take 2 tablets (650 mg) by mouth every 4 hours as needed for other (surgical pain) 100 tablet 0     aspirin 325 MG tablet Take 1 tablet (325 mg) by mouth daily 120 tablet 0     polyethylene glycol (MIRALAX/GLYCOLAX) Packet Take 17 g by mouth daily as needed for constipation 7 packet 0     senna-docusate (SENOKOT-S;PERICOLACE) 8.6-50 MG per tablet Take 1-2 tablets by mouth 2 times daily as needed (constipation ) 100 tablet 0     order for DME Equipment being ordered: 2 shoe 1 Device 0     tamsulosin (FLOMAX) 0.4 MG 24 hr capsule Take 0.8 mg by mouth At Bedtime        Cholecalciferol (VITAMIN D PO) Take 1,000 Units by mouth daily.        atorvastatin (LIPITOR) 40 MG tablet Take 1 tablet by mouth daily. 30 tablet 1     Ascorbic Acid (VITAMIN C PO) Take 500 mg by mouth daily.       Nitroglycerin (NITROQUICK SL) Place 0.4 mg under the tongue as needed.       Exam:  B/P: 122/66, T: 97.4, P: 88,   Gen: Alert and in no distress.    Psych: Normal affect. Alert and oriented.   HEENT: PERRL. No icterus. Oropharynx pink and moist without lesions.   Neck: No lymphadenopathy.   CV: Irregularly irregular. Normal rate.   Chest: Clear to auscultation bilaterally without wheezes or crackles.   Back: Midline incision well healed. No significant tenderness over spine.   Abdomen: Soft, non-distended. Non-tender. Normal bowel sounds.   Extremities: Mild peripheral edema.   Skin: No rashes or lesions noted.     Labs:  WBC   Date Value Ref Range Status   03/17/2017 5.1 4.0 - 11.0 10e9/L Final     CRP Inflammation   Date Value Ref Range Status   03/15/2017 81.0 (H) 0.0 - 8.0 mg/L Final   03/13/2017 74.7 (H) 0.0 - 8.0 mg/L Final   03/06/2017 19.9 (H) 0.0 - 8.0 mg/L Final   02/24/2017 27.6 (H) 0.0 - 8.0 mg/L Final   02/20/2017 127.0 (H) 0.0 - 8.0 mg/L Final      Assessment and Plan:  Mr. Manning is improving slowly in strength. He continues to have episodes of shortness of breath but they are stable. He is scheduled to see cardiology on 5/1. His CRP has been increasing recently without clear cause. We will continue ceftriaxone, add metronidazole to optimally cover the anaerobes that grew late from his operative cultures, and re-assess in 3 weeks.     Plan:   1. Continue ceftriaxone through 4/13  2. Start metronidazole 500 mg PO TID x 3 weeks. Will re-evaluate need for it based on repeat labs in 1 week. If significant decreased appetite, bad taste in mouth, confusion or new tingling of fingers or toes occurs after starting metronidazole, discontinue and call the infectious diseases clinic at [phone].   3. Please check CRP, CBC, and CMP today and in 1 week and fax to Dr. Max at [phone].   4. Please ensure that neurosurgery follow-up appointment is scheduled.   5. Return to ID clinic on 4/13/17 at 11:00 AM    Gillian Max MD  Infectious Diseases  [phone]

## 2017-04-06 ENCOUNTER — HOSPITAL ENCOUNTER (INPATIENT)
Facility: CLINIC | Age: 77
LOS: 2 days | Discharge: NURSING, REHAB OR LONG-TERM CARE | DRG: 291 | End: 2017-04-08
Attending: EMERGENCY MEDICINE | Admitting: INTERNAL MEDICINE
Payer: MEDICARE

## 2017-04-06 ENCOUNTER — APPOINTMENT (OUTPATIENT)
Dept: GENERAL RADIOLOGY | Facility: CLINIC | Age: 77
DRG: 291 | End: 2017-04-06
Attending: EMERGENCY MEDICINE
Payer: MEDICARE

## 2017-04-06 DIAGNOSIS — I50.23 ACUTE ON CHRONIC SYSTOLIC CONGESTIVE HEART FAILURE (H): ICD-10-CM

## 2017-04-06 DIAGNOSIS — G06.2 EPIDURAL ABSCESS: ICD-10-CM

## 2017-04-06 DIAGNOSIS — N28.9 RENAL INSUFFICIENCY: ICD-10-CM

## 2017-04-06 DIAGNOSIS — I48.92 ATRIAL FLUTTER, UNSPECIFIED TYPE (H): ICD-10-CM

## 2017-04-06 DIAGNOSIS — E11.42 TYPE 2 DIABETES MELLITUS WITH DIABETIC POLYNEUROPATHY, WITHOUT LONG-TERM CURRENT USE OF INSULIN (H): ICD-10-CM

## 2017-04-06 DIAGNOSIS — I73.9 CLAUDICATION OF LEFT LOWER EXTREMITY (H): ICD-10-CM

## 2017-04-06 DIAGNOSIS — R06.00 DYSPNEA, UNSPECIFIED TYPE: ICD-10-CM

## 2017-04-06 DIAGNOSIS — F51.01 PRIMARY INSOMNIA: ICD-10-CM

## 2017-04-06 DIAGNOSIS — T50.1X5D LOOP DIURETIC CAUSING ADVERSE EFFECT IN THERAPEUTIC USE, SUBSEQUENT ENCOUNTER: ICD-10-CM

## 2017-04-06 DIAGNOSIS — J96.01 ACUTE RESPIRATORY FAILURE WITH HYPOXIA (H): Primary | ICD-10-CM

## 2017-04-06 DIAGNOSIS — K59.00 CONSTIPATION, UNSPECIFIED CONSTIPATION TYPE: ICD-10-CM

## 2017-04-06 DIAGNOSIS — K21.9 GASTROESOPHAGEAL REFLUX DISEASE WITHOUT ESOPHAGITIS: ICD-10-CM

## 2017-04-06 DIAGNOSIS — I10 ESSENTIAL HYPERTENSION: ICD-10-CM

## 2017-04-06 DIAGNOSIS — M54.5 ACUTE MIDLINE LOW BACK PAIN, WITH SCIATICA PRESENCE UNSPECIFIED: ICD-10-CM

## 2017-04-06 LAB
ALBUMIN SERPL-MCNC: 2.4 G/DL (ref 3.4–5)
ALP SERPL-CCNC: 154 U/L (ref 40–150)
ALT SERPL W P-5'-P-CCNC: 17 U/L (ref 0–70)
ANION GAP SERPL CALCULATED.3IONS-SCNC: 10 MMOL/L (ref 3–14)
AST SERPL W P-5'-P-CCNC: 10 U/L (ref 0–45)
BASOPHILS # BLD AUTO: 0 10E9/L (ref 0–0.2)
BASOPHILS NFR BLD AUTO: 0.2 %
BILIRUB SERPL-MCNC: 0.2 MG/DL (ref 0.2–1.3)
BUN SERPL-MCNC: 10 MG/DL (ref 7–30)
CALCIUM SERPL-MCNC: 8 MG/DL (ref 8.5–10.1)
CHLORIDE SERPL-SCNC: 108 MMOL/L (ref 94–109)
CO2 BLDCOV-SCNC: 25 MMOL/L (ref 21–28)
CO2 SERPL-SCNC: 25 MMOL/L (ref 20–32)
CREAT SERPL-MCNC: 0.63 MG/DL (ref 0.66–1.25)
DIFFERENTIAL METHOD BLD: ABNORMAL
EOSINOPHIL # BLD AUTO: 0 10E9/L (ref 0–0.7)
EOSINOPHIL NFR BLD AUTO: 0.5 %
ERYTHROCYTE [DISTWIDTH] IN BLOOD BY AUTOMATED COUNT: 18 % (ref 10–15)
FLUAV+FLUBV AG SPEC QL: NEGATIVE
FLUAV+FLUBV AG SPEC QL: NORMAL
GFR SERPL CREATININE-BSD FRML MDRD: ABNORMAL ML/MIN/1.7M2
GLUCOSE BLDC GLUCOMTR-MCNC: 146 MG/DL (ref 70–99)
GLUCOSE SERPL-MCNC: 120 MG/DL (ref 70–99)
HCT VFR BLD AUTO: 33.6 % (ref 40–53)
HGB BLD-MCNC: 9.8 G/DL (ref 13.3–17.7)
IMM GRANULOCYTES # BLD: 0 10E9/L (ref 0–0.4)
IMM GRANULOCYTES NFR BLD: 0.2 %
INTERPRETATION ECG - MUSE: NORMAL
LACTATE BLD-SCNC: 0.6 MMOL/L (ref 0.7–2.1)
LYMPHOCYTES # BLD AUTO: 0.8 10E9/L (ref 0.8–5.3)
LYMPHOCYTES NFR BLD AUTO: 12.4 %
MCH RBC QN AUTO: 26.4 PG (ref 26.5–33)
MCHC RBC AUTO-ENTMCNC: 29.2 G/DL (ref 31.5–36.5)
MCV RBC AUTO: 91 FL (ref 78–100)
MONOCYTES # BLD AUTO: 0.3 10E9/L (ref 0–1.3)
MONOCYTES NFR BLD AUTO: 5.6 %
NEUTROPHILS # BLD AUTO: 4.9 10E9/L (ref 1.6–8.3)
NEUTROPHILS NFR BLD AUTO: 81.1 %
NRBC # BLD AUTO: 0 10*3/UL
NRBC BLD AUTO-RTO: 0 /100
NT-PROBNP SERPL-MCNC: ABNORMAL PG/ML (ref 0–900)
PCO2 BLDV: 48 MM HG (ref 40–50)
PH BLDV: 7.33 PH (ref 7.32–7.43)
PLATELET # BLD AUTO: 171 10E9/L (ref 150–450)
PLATELET # BLD AUTO: 180 10E9/L (ref 150–450)
PO2 BLDV: 32 MM HG (ref 25–47)
POTASSIUM SERPL-SCNC: 3.6 MMOL/L (ref 3.4–5.3)
PROT SERPL-MCNC: 5.9 G/DL (ref 6.8–8.8)
RBC # BLD AUTO: 3.71 10E12/L (ref 4.4–5.9)
SAO2 % BLDV FROM PO2: 56 %
SODIUM SERPL-SCNC: 144 MMOL/L (ref 133–144)
SPECIMEN SOURCE: NORMAL
WBC # BLD AUTO: 6.1 10E9/L (ref 4–11)

## 2017-04-06 PROCEDURE — 25000128 H RX IP 250 OP 636: Performed by: INTERNAL MEDICINE

## 2017-04-06 PROCEDURE — 83880 ASSAY OF NATRIURETIC PEPTIDE: CPT | Performed by: EMERGENCY MEDICINE

## 2017-04-06 PROCEDURE — 96375 TX/PRO/DX INJ NEW DRUG ADDON: CPT | Performed by: EMERGENCY MEDICINE

## 2017-04-06 PROCEDURE — 87804 INFLUENZA ASSAY W/OPTIC: CPT | Performed by: EMERGENCY MEDICINE

## 2017-04-06 PROCEDURE — 21400006 ZZH R&B CCU INTERMEDIATE UMMC

## 2017-04-06 PROCEDURE — 00000146 ZZHCL STATISTIC GLUCOSE BY METER IP

## 2017-04-06 PROCEDURE — 99285 EMERGENCY DEPT VISIT HI MDM: CPT | Mod: 25 | Performed by: EMERGENCY MEDICINE

## 2017-04-06 PROCEDURE — 93010 ELECTROCARDIOGRAM REPORT: CPT | Mod: Z6 | Performed by: EMERGENCY MEDICINE

## 2017-04-06 PROCEDURE — 83605 ASSAY OF LACTIC ACID: CPT

## 2017-04-06 PROCEDURE — 96365 THER/PROPH/DIAG IV INF INIT: CPT | Performed by: EMERGENCY MEDICINE

## 2017-04-06 PROCEDURE — 71010 XR CHEST PORT 1 VW: CPT

## 2017-04-06 PROCEDURE — 93005 ELECTROCARDIOGRAM TRACING: CPT | Performed by: EMERGENCY MEDICINE

## 2017-04-06 PROCEDURE — 82803 BLOOD GASES ANY COMBINATION: CPT

## 2017-04-06 PROCEDURE — 25000128 H RX IP 250 OP 636: Performed by: STUDENT IN AN ORGANIZED HEALTH CARE EDUCATION/TRAINING PROGRAM

## 2017-04-06 PROCEDURE — 85049 AUTOMATED PLATELET COUNT: CPT | Performed by: STUDENT IN AN ORGANIZED HEALTH CARE EDUCATION/TRAINING PROGRAM

## 2017-04-06 PROCEDURE — 80053 COMPREHEN METABOLIC PANEL: CPT | Performed by: EMERGENCY MEDICINE

## 2017-04-06 PROCEDURE — 85025 COMPLETE CBC W/AUTO DIFF WBC: CPT | Performed by: EMERGENCY MEDICINE

## 2017-04-06 PROCEDURE — A9270 NON-COVERED ITEM OR SERVICE: HCPCS | Mod: GY | Performed by: STUDENT IN AN ORGANIZED HEALTH CARE EDUCATION/TRAINING PROGRAM

## 2017-04-06 PROCEDURE — 96376 TX/PRO/DX INJ SAME DRUG ADON: CPT | Performed by: EMERGENCY MEDICINE

## 2017-04-06 PROCEDURE — 99223 1ST HOSP IP/OBS HIGH 75: CPT | Mod: GC | Performed by: INTERNAL MEDICINE

## 2017-04-06 PROCEDURE — 25000132 ZZH RX MED GY IP 250 OP 250 PS 637: Mod: GY | Performed by: STUDENT IN AN ORGANIZED HEALTH CARE EDUCATION/TRAINING PROGRAM

## 2017-04-06 RX ORDER — FUROSEMIDE 10 MG/ML
40 INJECTION INTRAMUSCULAR; INTRAVENOUS
Status: DISCONTINUED | OUTPATIENT
Start: 2017-04-07 | End: 2017-04-07

## 2017-04-06 RX ORDER — MAGNESIUM SULFATE HEPTAHYDRATE 40 MG/ML
4 INJECTION, SOLUTION INTRAVENOUS EVERY 4 HOURS PRN
Status: DISCONTINUED | OUTPATIENT
Start: 2017-04-06 | End: 2017-04-08 | Stop reason: HOSPADM

## 2017-04-06 RX ORDER — ASCORBIC ACID 500 MG
500 TABLET ORAL DAILY
Status: DISCONTINUED | OUTPATIENT
Start: 2017-04-07 | End: 2017-04-08 | Stop reason: HOSPADM

## 2017-04-06 RX ORDER — POTASSIUM CHLORIDE 29.8 MG/ML
20 INJECTION INTRAVENOUS
Status: DISCONTINUED | OUTPATIENT
Start: 2017-04-06 | End: 2017-04-08 | Stop reason: HOSPADM

## 2017-04-06 RX ORDER — PROCHLORPERAZINE 25 MG
12.5 SUPPOSITORY, RECTAL RECTAL EVERY 12 HOURS PRN
Status: DISCONTINUED | OUTPATIENT
Start: 2017-04-06 | End: 2017-04-08 | Stop reason: HOSPADM

## 2017-04-06 RX ORDER — POTASSIUM CHLORIDE 1.5 G/1.58G
20-40 POWDER, FOR SOLUTION ORAL
Status: DISCONTINUED | OUTPATIENT
Start: 2017-04-06 | End: 2017-04-08 | Stop reason: HOSPADM

## 2017-04-06 RX ORDER — FUROSEMIDE 10 MG/ML
40 INJECTION INTRAMUSCULAR; INTRAVENOUS ONCE
Status: COMPLETED | OUTPATIENT
Start: 2017-04-06 | End: 2017-04-06

## 2017-04-06 RX ORDER — PROCHLORPERAZINE MALEATE 5 MG
5 TABLET ORAL EVERY 6 HOURS PRN
Status: DISCONTINUED | OUTPATIENT
Start: 2017-04-06 | End: 2017-04-08 | Stop reason: HOSPADM

## 2017-04-06 RX ORDER — POLYETHYLENE GLYCOL 3350 17 G/17G
17 POWDER, FOR SOLUTION ORAL DAILY PRN
Status: DISCONTINUED | OUTPATIENT
Start: 2017-04-06 | End: 2017-04-08 | Stop reason: HOSPADM

## 2017-04-06 RX ORDER — NALOXONE HYDROCHLORIDE 0.4 MG/ML
.1-.4 INJECTION, SOLUTION INTRAMUSCULAR; INTRAVENOUS; SUBCUTANEOUS
Status: DISCONTINUED | OUTPATIENT
Start: 2017-04-06 | End: 2017-04-08 | Stop reason: HOSPADM

## 2017-04-06 RX ORDER — ATORVASTATIN CALCIUM 40 MG/1
40 TABLET, FILM COATED ORAL AT BEDTIME
Status: DISCONTINUED | OUTPATIENT
Start: 2017-04-06 | End: 2017-04-08 | Stop reason: HOSPADM

## 2017-04-06 RX ORDER — POTASSIUM CHLORIDE 750 MG/1
20-40 TABLET, EXTENDED RELEASE ORAL
Status: DISCONTINUED | OUTPATIENT
Start: 2017-04-06 | End: 2017-04-08 | Stop reason: HOSPADM

## 2017-04-06 RX ORDER — MIRTAZAPINE 15 MG/1
15 TABLET, FILM COATED ORAL AT BEDTIME
Status: DISCONTINUED | OUTPATIENT
Start: 2017-04-06 | End: 2017-04-08 | Stop reason: HOSPADM

## 2017-04-06 RX ORDER — TAMSULOSIN HYDROCHLORIDE 0.4 MG/1
0.8 CAPSULE ORAL AT BEDTIME
Status: DISCONTINUED | OUTPATIENT
Start: 2017-04-06 | End: 2017-04-08 | Stop reason: HOSPADM

## 2017-04-06 RX ORDER — LANOLIN ALCOHOL/MO/W.PET/CERES
3 CREAM (GRAM) TOPICAL
Status: DISCONTINUED | OUTPATIENT
Start: 2017-04-06 | End: 2017-04-08 | Stop reason: HOSPADM

## 2017-04-06 RX ORDER — CEFTRIAXONE 2 G/1
2 INJECTION, POWDER, FOR SOLUTION INTRAMUSCULAR; INTRAVENOUS EVERY 12 HOURS
Status: DISCONTINUED | OUTPATIENT
Start: 2017-04-06 | End: 2017-04-08 | Stop reason: HOSPADM

## 2017-04-06 RX ORDER — ONDANSETRON 4 MG/1
4 TABLET, ORALLY DISINTEGRATING ORAL EVERY 6 HOURS PRN
Status: DISCONTINUED | OUTPATIENT
Start: 2017-04-06 | End: 2017-04-08 | Stop reason: HOSPADM

## 2017-04-06 RX ORDER — POTASSIUM CHLORIDE 7.45 MG/ML
10 INJECTION INTRAVENOUS
Status: DISCONTINUED | OUTPATIENT
Start: 2017-04-06 | End: 2017-04-08 | Stop reason: HOSPADM

## 2017-04-06 RX ORDER — ACETAMINOPHEN 325 MG/1
650 TABLET ORAL EVERY 4 HOURS PRN
Status: DISCONTINUED | OUTPATIENT
Start: 2017-04-06 | End: 2017-04-08 | Stop reason: HOSPADM

## 2017-04-06 RX ORDER — DEXTROSE MONOHYDRATE 25 G/50ML
25-50 INJECTION, SOLUTION INTRAVENOUS
Status: DISCONTINUED | OUTPATIENT
Start: 2017-04-06 | End: 2017-04-08 | Stop reason: HOSPADM

## 2017-04-06 RX ORDER — NICOTINE POLACRILEX 4 MG
15-30 LOZENGE BUCCAL
Status: DISCONTINUED | OUTPATIENT
Start: 2017-04-06 | End: 2017-04-08 | Stop reason: HOSPADM

## 2017-04-06 RX ORDER — ASPIRIN 325 MG
325 TABLET ORAL DAILY
Status: DISCONTINUED | OUTPATIENT
Start: 2017-04-07 | End: 2017-04-08 | Stop reason: HOSPADM

## 2017-04-06 RX ORDER — ONDANSETRON 2 MG/ML
4 INJECTION INTRAMUSCULAR; INTRAVENOUS EVERY 6 HOURS PRN
Status: DISCONTINUED | OUTPATIENT
Start: 2017-04-06 | End: 2017-04-08 | Stop reason: HOSPADM

## 2017-04-06 RX ORDER — CLOPIDOGREL BISULFATE 75 MG/1
75 TABLET ORAL DAILY
Status: DISCONTINUED | OUTPATIENT
Start: 2017-04-07 | End: 2017-04-08 | Stop reason: HOSPADM

## 2017-04-06 RX ORDER — HEPARIN SODIUM 5000 [USP'U]/.5ML
5000 INJECTION, SOLUTION INTRAVENOUS; SUBCUTANEOUS EVERY 12 HOURS
Status: DISCONTINUED | OUTPATIENT
Start: 2017-04-06 | End: 2017-04-08 | Stop reason: HOSPADM

## 2017-04-06 RX ORDER — METRONIDAZOLE 500 MG/1
500 TABLET ORAL EVERY 8 HOURS SCHEDULED
Status: DISCONTINUED | OUTPATIENT
Start: 2017-04-06 | End: 2017-04-06

## 2017-04-06 RX ORDER — CEFTRIAXONE 2 G/1
2 INJECTION, POWDER, FOR SOLUTION INTRAMUSCULAR; INTRAVENOUS EVERY 12 HOURS
Status: DISCONTINUED | OUTPATIENT
Start: 2017-04-06 | End: 2017-04-06

## 2017-04-06 RX ORDER — AMOXICILLIN 250 MG
1-2 CAPSULE ORAL 2 TIMES DAILY PRN
Status: DISCONTINUED | OUTPATIENT
Start: 2017-04-06 | End: 2017-04-08 | Stop reason: HOSPADM

## 2017-04-06 RX ORDER — POTASSIUM CHLORIDE 750 MG/1
20 TABLET, EXTENDED RELEASE ORAL DAILY
Status: DISCONTINUED | OUTPATIENT
Start: 2017-04-06 | End: 2017-04-08 | Stop reason: HOSPADM

## 2017-04-06 RX ORDER — LISINOPRIL 10 MG/1
10 TABLET ORAL DAILY
Status: DISCONTINUED | OUTPATIENT
Start: 2017-04-07 | End: 2017-04-08 | Stop reason: HOSPADM

## 2017-04-06 RX ORDER — METRONIDAZOLE 500 MG/1
500 TABLET ORAL EVERY 8 HOURS SCHEDULED
Status: DISCONTINUED | OUTPATIENT
Start: 2017-04-06 | End: 2017-04-08 | Stop reason: HOSPADM

## 2017-04-06 RX ADMIN — METRONIDAZOLE 500 MG: 500 TABLET ORAL at 22:19

## 2017-04-06 RX ADMIN — MIRTAZAPINE 15 MG: 15 TABLET, FILM COATED ORAL at 22:20

## 2017-04-06 RX ADMIN — METRONIDAZOLE 500 MG: 500 TABLET ORAL at 17:24

## 2017-04-06 RX ADMIN — FUROSEMIDE 40 MG: 10 INJECTION, SOLUTION INTRAVENOUS at 08:12

## 2017-04-06 RX ADMIN — ATORVASTATIN CALCIUM 40 MG: 40 TABLET, FILM COATED ORAL at 22:19

## 2017-04-06 RX ADMIN — HEPARIN SODIUM 5000 UNITS: 5000 INJECTION, SOLUTION INTRAVENOUS; SUBCUTANEOUS at 21:10

## 2017-04-06 RX ADMIN — FUROSEMIDE 40 MG: 10 INJECTION, SOLUTION INTRAVENOUS at 17:24

## 2017-04-06 RX ADMIN — POTASSIUM CHLORIDE 20 MEQ: 750 TABLET, EXTENDED RELEASE ORAL at 17:24

## 2017-04-06 RX ADMIN — RANITIDINE HYDROCHLORIDE 150 MG: 150 TABLET, FILM COATED ORAL at 21:10

## 2017-04-06 RX ADMIN — METFORMIN HYDROCHLORIDE 500 MG: 500 TABLET ORAL at 21:10

## 2017-04-06 RX ADMIN — METOPROLOL TARTRATE 75 MG: 50 TABLET, FILM COATED ORAL at 21:10

## 2017-04-06 RX ADMIN — TAMSULOSIN HYDROCHLORIDE 0.8 MG: 0.4 CAPSULE ORAL at 22:19

## 2017-04-06 RX ADMIN — CEFTRIAXONE 2 G: 2 INJECTION, POWDER, FOR SOLUTION INTRAMUSCULAR; INTRAVENOUS at 17:24

## 2017-04-06 ASSESSMENT — ENCOUNTER SYMPTOMS
COUGH: 1
CONFUSION: 0
DIAPHORESIS: 0
HEADACHES: 0
WEAKNESS: 1
SHORTNESS OF BREATH: 1
VOMITING: 0
BRUISES/BLEEDS EASILY: 0
RHINORRHEA: 0
NAUSEA: 0
DIARRHEA: 0
ACTIVITY CHANGE: 1
DIZZINESS: 0
LIGHT-HEADEDNESS: 0
FEVER: 0
NECK PAIN: 0
CHEST TIGHTNESS: 0
SORE THROAT: 0
CHILLS: 0
PALPITATIONS: 0
ABDOMINAL PAIN: 0

## 2017-04-06 NOTE — H&P
[facility] Cardiology History and Physical    Tad Manning MRN# [id number]   Age: 76 year old YOB: 1940     Date of Admission:  4/6/2017    Primary care provider: Gene Guevara          Assessment and Plan:   Assessment:  Tad Manning is a 75 y/o male with history of CAD s/p three veseel CABG in 1995, cardiomyopathy with LVEF 25-30%, DM-II, CLL in remission, HTN and PVD s/p iliac artery stents and left lower extremity bypass who presents with shortness of breath    Plan:  # Acute respiratory failure with hypoxia   # Acute on chronic systolic heart failure  CXR revealed pulmonary edema and bilateral pleural effusions with  overlying atelectasis.   - Furosemide IV 40mg BID  - Daily weights  - Strict I/O  - Monitor and replete lytes  - Supplemental oxygen ordered    # Atrial flutter with variable block  CHADSVASC score of 4, long-term anticoagulation would be preferable. 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  - Continue Ceftriaxone 2g q12h BID and Metronidazole 500mg TID  - Trend CBC and CRP    # HTN  Stable on admission.  - Continue to trend BPs  - Continue home lisinopril    # DM-II  Well-controlled, with Hgb A1c @ 6.1 on 2/10/2017  - Continue Lantus 22U qAM  - Continue Metformin 500mg BID  - Hypoglycemic protocol ordered with SSI    # Peripheral vascular disease  S/p bilateral lower extremity stents  - Continue Plavix and aspirin    FEN:   - monitor and replete lytes  - cardiac diet, 1.5L fluid restriction  Prophylaxis:   - DVT: subcutaneous heparin  - GI: none    Code Status: FULL     Disposition: Pending management of fluid overload    Patient seen and discussed with Dr. Belcher, who agrees with above plan.    Derek Navas DDS  Oral & Maxillofacial Surgery  PGY-1          Chief  Complaint:   Difficulty breathing         History of Present Illness:   Tad Vega is a 76-year-old with history of considerable coronary artery and peripheral artery disease, diabetes and hypertension who presents from [facility] inpatient rehab due to shortness of breath.    Mr. Manning presented initially to [facility] 1/20/17 with lower extremity weakness and syncopal episodes felt to be linked with orthostatic hypotension and acute renal failure. Discharged home on the 1/25/17, presented back 2/7/17 and he was identified with thoracic diskitis and paraspinal abscess with spinal cord impingement. He transferred to [facility] 2/9/17 for further workup and management. He underwent surgical decompression and fusion, procedure as above on 2/12/17. Before surgery, he had almost complete leg paralysis. Post-surgically, he started to have some functional movement return. He was started on broad-spectrum antibiotics. The cultures subsequently grew moderate Streptococcus mitis along with various anaerobic species. Treatment modalities were narrowed to IV ceftriaxone and a PICC line was placed. [facility] underwent inpatient therapy. The patient was continually followed by Infectious Disease who recommended that he continue ceftriaxone 2g q12h and metronidazole 500mg TID until 4/13/17.    The patient reports onset of dyspnea today with a dry, non-productive cough. He denied chest pain, fever/chills, swelling, or drainage. He does endorse bilateral leg swelling.         Review of Systems:   General: Patient reports limited activity recently. He denies weight loss, night sweats, fever/chills, or fatigue.  Skin: Patient denies rash, scaling, or erythema.  HEENT: Patient denies changes in vision, congestion, sore throat, or lymphadenopathy.  Cardio: Patient reports leg swelling. He denies palpations or chest pain.  Respiratory:  Patient reports shortness of breath, needing to sleep on incline, and cough.   GI: Patient denies abdominal pain, diarrhea, or constipation.  : Patient denies change in urinary frequency, dysuria, or hematuria.  MSK: Patient reports gait problem. He denies weakness or pain in his extremities.  Neuro: Patient reports weakness. He denies diminished sensation.  Psych: Patient denies hallucinations or delusion.  Heme: Patient denies spontaneous bruising or bleeding.  Endo: Patient denies intolerance to heat/cold, brittle hair, sugar intolerance.         Past Medical History:     Past Cardiac History:  CAD s/p CABG 1995  Mild chronic left ventricular systolic dysfunction     Last Echocardiogram: 2/15/2017  Interpretation Summary  Technically difficult study.The patient's rhythm is atrial fibrillation. The left ventricle is severely dilated. Left ventricular systolic function is severely reduced, ejection fraction is estimated at 25-30%. There is severe  global hypokinesis.    Right ventricular systolic function is mildly reduced. The right ventricular systolic pressure is approximated at 15 mmHg plus the  right atrial pressure. IVC is plethoric and estimated mean RA pressure is 15  MmHg. No pericardial effusion present.    Past Medical History:   Diagnosis Date     Arthritis      ASCVD (arteriosclerotic cardiovascular disease)      BMI 30.0-30.9,adult      BPH (benign prostatic hypertrophy)      Cellulitis      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)      Coronary artery disease     triple bypass 1995     Diabetes mellitus (H)      Diabetic polyneuropathy (H)      History of blood transfusion      Hyperlipidaemia      Hypertension      Malignant neoplasm (H)     CLL     Noninflammatory pericardial effusion      Osteomyelitis of left foot (H)      PVD (peripheral vascular disease) (H)      Sebaceous cyst              Past Surgical History:     Past Surgical History:   Procedure  Laterality Date     AMPUTATE TOE(S)  1/10/2014    Procedure: AMPUTATE TOE(S);;  Surgeon: Amador Vick MD;  Location:  OR     BONE MARROW BIOPSY, BONE SPECIMEN, NEEDLE/TROCAR  10/19/2012    Procedure: BIOPSY BONE MARROW;  BONE MARROW BIOPSY  (CONSCIOUS SED);  Surgeon: Ramon Quesada MD;  Location:  GI     BYPASS GRAFT FEMOROPOPLITEAL  1/10/2014    Procedure: BYPASS GRAFT FEMOROPOPLITEAL;  Left above knee popliteal to  Below knee popliteal bypass using transverse saphenous vein graft. Left 2nd Toe amputation;  Surgeon: Amador Vick MD;  Location:  OR     CARDIAC SURGERY      angioplasty with stent, triple bypass     EXCISE CYST GENERIC (LOCATION) N/A 2/1/2016    Procedure: EXCISE CYST GENERIC (LOCATION);  Surgeon: Amador Vick MD;  Location:  SD     GRAFT VEIN FROM EXTREMITY (LOCATION)  1/10/2014    Procedure: GRAFT VEIN FROM EXTREMITY (LOCATION);;  Surgeon: Amador Vick MD;  Location:  OR     LAMINECTOMY THORACIC ONE LEVEL N/A 2/8/2017    Procedure: LAMINECTOMY THORACIC ONE LEVEL;  Surgeon: Marcelo Trent MD;  Location: [facility] OR     OPTICAL TRACKING SYSTEM FUSION POSTERIOR SPINE THORACIC THREE+ LEVELS N/A 2/12/2017    Procedure: OPTICAL TRACKING SYSTEM FUSION POSTERIOR SPINE THORACIC THREE+ LEVELS;  Surgeon: Marcelo Trent MD;  Location: [facility] OR     TONSILLECTOMY       VASCULAR SURGERY      ptcr legs             Social History:     Social History   Substance Use Topics     Smoking status: Former Smoker     Packs/day: 2.00     Years: 30.00     Types: Cigarettes     Quit date: 6/19/1995     Smokeless tobacco: Never Used     Alcohol use Yes      Comment: 1 drink monthly             Family History:   Family History reviewed.  Family History   Problem Relation Age of Onset     CANCER Mother      leukemia             Allergies:     Allergies   Allergen Reactions     Blood Transfusion Related (Informational Only) Other (See Comments)     Patient has a history of a clinically  significant antibody against RBC antigens.  A delay in compatible RBCs may occur.              Medications:   Medications Reviewed.   Current Facility-Administered Medications   Medication     sodium chloride (PF) 0.9% PF flush 3 mL     Current Outpatient Prescriptions   Medication Sig     CEFTRIAXONE SODIUM IV Inject 2 g into the vein 2 times daily     METRONIDAZOLE PO Take 500 mg by mouth 3 times daily      Skin Protectants, Misc. (CALAZIME SKIN PROTECTANT EX) Apply topically daily     nystatin (MYCOSTATIN) 304550 UNIT/GM POWD      melatonin 3 MG tablet Take 1 tablet (3 mg) by mouth nightly as needed     mirtazapine (REMERON) 15 MG tablet Take 1 tablet (15 mg) by mouth At Bedtime     insulin glargine (LANTUS) 100 UNIT/ML injection Inject 22 Units Subcutaneous every morning (before breakfast)     metFORMIN (GLUCOPHAGE) 500 MG tablet Take 1 tablet (500 mg) by mouth 2 times daily (with meals)     lisinopril (PRINIVIL/ZESTRIL) 10 MG tablet Take 1 tablet (10 mg) by mouth daily     metoprolol (LOPRESSOR) 25 MG tablet Take 3 tablets (75 mg) by mouth 2 times daily     furosemide (LASIX) 40 MG tablet Take 1 tablet (40 mg) by mouth daily     potassium chloride SA (K-DUR/KLOR-CON M) 20 MEQ CR tablet Take 1 tablet (20 mEq) by mouth daily     clopidogrel (PLAVIX) 75 MG tablet Take 1 tablet (75 mg) by mouth daily     ranitidine (ZANTAC) 150 MG tablet Take 1 tablet (150 mg) by mouth 2 times daily     acetaminophen (TYLENOL) 325 MG tablet Take 2 tablets (650 mg) by mouth every 4 hours as needed for other (surgical pain)     aspirin 325 MG tablet Take 1 tablet (325 mg) by mouth daily     polyethylene glycol (MIRALAX/GLYCOLAX) Packet Take 17 g by mouth daily as needed for constipation     senna-docusate (SENOKOT-S;PERICOLACE) 8.6-50 MG per tablet Take 1-2 tablets by mouth 2 times daily as needed (constipation )     tamsulosin (FLOMAX) 0.4 MG 24 hr capsule Take 0.8 mg by mouth At Bedtime      Cholecalciferol (VITAMIN D PO) Take  1,000 Units by mouth daily.      atorvastatin (LIPITOR) 40 MG tablet Take 1 tablet by mouth daily.     Ascorbic Acid (VITAMIN C PO) Take 500 mg by mouth daily.     Nitroglycerin (NITROQUICK SL) Place 0.4 mg under the tongue as needed.     order for DME Equipment being ordered: [facility] shoe     Facility-Administered Medications Ordered in Other Encounters   Medication     prochlorperazine (COMPAZINE) injection 5 mg             Physical Exam:   Vitals were reviewed.  Blood pressure 117/76, temperature 97.6  F (36.4  C), temperature source Axillary, resp. rate 28, height 1.829 m (6'), weight 89.8 kg (198 lb), SpO2 96 %.    Constitutional: He appears well-developed and well-nourished. No distress.   HENT:   Head: Normocephalic and atraumatic.   Mouth/Throat: Oropharynx is clear and moist.   Eyes: Conjunctivae and EOM are normal.   Neck: Normal range of motion. Neck supple.   Cardiovascular: Normal rate, regular rhythm, normal heart sounds and intact distal pulses.   Pulmonary/Chest: Effort normal. No respiratory distress. He has rales.   Abdominal: Soft. There is no tenderness.   Musculoskeletal: He exhibits edema. He exhibits no tenderness.   Neurological: He is alert.   Skin: Skin is warm and dry.   Psychiatric: He has a normal mood and affect.          Data:   I/O last 3 completed shifts:  In: 300 [P.O.:300]  Out: 1750 [Urine:1750]    ROUTINE LABS (Last four results)  CMP  Recent Labs  Lab 04/06/17  0537      POTASSIUM 3.6   CHLORIDE 108   CO2 25   ANIONGAP 10   *   BUN 10   CR 0.63*   GFRESTIMATED >90Non  GFR Calc   GFRESTBLACK >90African American GFR Calc   JUSTINO 8.0*   PROTTOTAL 5.9*   ALBUMIN 2.4*   BILITOTAL 0.2   ALKPHOS 154*   AST 10   ALT 17     CBC  Recent Labs  Lab 04/06/17  0537   WBC 6.1   RBC 3.71*   HGB 9.8*   HCT 33.6*   MCV 91   MCH 26.4*   MCHC 29.2*   RDW 18.0*        Recent Results (from the past 24 hour(s))   Chest  XR, 1 view portable    Narrative    EXAM: XR CHEST  PORT 1 VW  4/6/2017 6:50 AM      HISTORY: short of breath    COMPARISON: 3/12/2017    FINDINGS: The tip of the right upper extremity PICC is poorly  visualized but reaches at least the mid SVC. Sternotomy wires and  posterior thoracic hardware fusion. Scattered mediastinal surgical  clips.    The cardiac silhouette is enlarged, stable. Moderate layering right  pleural effusion with overlying atelectasis versus consolidation,  similar to prior. Partially visualized small left pleural effusion.  Patchy left retrocardiac airspace opacity, also similar prior. No  definite pneumothorax. Worsening perihilar opacities.       Impression    IMPRESSION:   1. Worsening perihilar opacities, likely pulmonary edema.  2. Stable moderate right and small left pleural effusions with  overlying atelectasis versus consolidation. Stable left retrocardiac  airspace opacity of atelectasis versus infection.    I have personally reviewed the examination and initial interpretation  and I agree with the findings.    ANA ALEXANDER MD         EKG (4/6/17)  Sinus tachycardia  Left axis deviation  Nonspecific ST and T wave abnormality

## 2017-04-06 NOTE — IP AVS SNAPSHOT
Tad Manning #[id number] (CSN: [id number])  (76 year old M)  (Adm: 17)     [id number]               [address] [facility] BANK: [phone]            Patient Demographics     Patient Name Sex          Age SSN Address Phone    aTd Manning Male 1940 (76 year old) [id number] [address]   [place] [address] [phone] (Home)  NONE (Work)  [phone] (Mobile) *Preferred*      Emergency Contact(s)     Name Relation Home Work Mobile    Seferino Manning [phone] none [phone]    MackenzieJoyce vazquez [phone] NONE [phone]    Chris Fitzgerald Friend [phone]        Admission Information     Attending Provider Admitting Provider Admission Type Admission Date/Time    SHANE Grissom MD Madhusoodanan, K P, MD Emergency 17  0528    Discharge Date Hospital Service Auth/Cert Status Service Area     Cardiology Incomplete [facility]    Unit Room/Bed Admission Status       [facility] 6418/6418-02 Admission (Confirmed)       Admission     Complaint    Acute respiratory failure with hypoxia (H)      Hospital Account     Name Acct ID Class Status Primary Coverage    Tad Manning Itm [id number] Inpatient Open MEDICARE - MEDICARE            Guarantor Account (for Hospital Account #[id number])     Name Relation to Pt Service Area Active? Acct Type    Tad Manning Self FCS Yes Personal/Family    Address Phone          [address] [place] [address] [phone](H)  NONE(O)              Coverage Information (for Hospital Account #[id number])     1. MEDICARE/MEDICARE     F/O Payor/Plan Precert #    MEDICARE/MEDICARE     Subscriber Subscriber #    Tad Manning [id number]    Address Phone    [employer] CLAIMS  [address]  [place] [address] [phone]          2. [place]     F/[place] Payor/Plan Precert #    [place]     Subscriber Subscriber #    Tad Manning  [id number]    Address Phone    [address] [address]  [place] [address] [phone]                                                      INTERAGENCY TRANSFER FORM - PHYSICIAN ORDERS   4/6/2017                       [address] [facility] BANK: [phone]            Attending Provider: SHANE Grissom MD     Allergies:  Blood Transfusion Related (Informational Only)    Infection:  None   Service:  CARDIOLOGY    Ht:  1.829 m (6')   Wt:  88.1 kg (194 lb 3.6 oz)   Admission Wt:  89.8 kg (198 lb)    BMI:  26.34 kg/m 2   BSA:  2.12 m 2            ED Clinical Impression     Diagnosis Description Comment Added By Time Added    Dyspnea, unspecified type [R06.00] Dyspnea, unspecified type [R06.00]  Daniel Meyer MD 4/6/2017  7:52 AM    Acute on chronic systolic congestive heart failure (H) [I50.23] Acute on chronic systolic congestive heart failure (H) [I50.23]  Bartolo Aldana MD 4/6/2017  8:07 AM    Atrial flutter, unspecified type (H) [I48.92] Atrial flutter, unspecified type (H) [I48.92]  Bartolo Aldana MD 4/6/2017  8:07 AM      Hospital Problems as of 4/8/2017              Priority Class Noted POA    Acute respiratory failure with hypoxia (H) Medium  4/6/2017 Yes      Non-Hospital Problems as of 4/8/2017              Priority Class Noted    Critical lower limb ischemia , s/p L AK pop to BK pop bypass with rGSV on 1/10/2014   1/10/2014    Diabetes mellitus, type 2 (H) Medium  1/11/2014    HTN (hypertension) Medium  1/11/2014    CAD (coronary artery disease) Medium  1/11/2014    Hyperlipidemia with target LDL less than 100 Medium  1/11/2014    Osteomyelitis left 2nd Toe s/p amputaion 1/10/14 Medium  1/11/2014    Renal insufficiency Medium  1/20/2014    Renal failure Medium  1/20/2017    Fall Medium  2/7/2017    Discitis Medium  2/8/2017    Epidural abscess Medium  2/9/2017    Osteomyelitis of thoracic spine (H) Medium  2/15/2017    Atrial flutter (H) Medium  2/22/2017      Code Status History     Date Active Date  Inactive Code Status Order ID Comments User Context    4/8/2017 11:04 AM  Full Code [id number]  Derek Navas MD Outpatient    4/6/2017  8:15 PM 4/8/2017 11:04 AM Full Code [id number]  Derek Navas MD Inpatient    3/18/2017  9:48 PM 4/6/2017  8:15 PM Full Code [id number]  Dale Leroy MD Outpatient    2/15/2017  3:30 PM 3/18/2017  9:48 PM Full Code [id number]  Hyun Koehler MD Inpatient    2/15/2017 11:43 AM 2/15/2017  3:30 PM Full Code [id number]  Jeaneth Mesa MD Outpatient    2/8/2017 10:43 PM 2/15/2017 11:43 AM Full Code [id number]  Ayse Scott PA-C Outpatient    2/7/2017  9:45 PM 2/8/2017 10:43 PM Full Code [id number]  Moody Beckford MD ED    1/23/2017  3:32 PM 2/7/2017  9:45 PM Full Code [id number]  Patria Dee MD Outpatient    1/20/2017  6:43 PM 1/23/2017  3:32 PM Full Code [id number]  Kalia Barrientos DO Inpatient    1/10/2014  5:40 PM 1/14/2014  5:20 PM Full Code [id number]  Kiya Betts RN Inpatient      Current Code Status     Date Active Code Status Order ID Comments User Context       Prior      Summary of Visit     Reason for your hospital stay       Dear Mr. Manning,      You were hospitalized at [facility] with respiratory distress due to fluid overload and treated with diuretics. Over your hospitalization your breathing substantially improved and today you are ready to be discharged to your rehabilitation facility. If you continue diuretic therapy you should continue to improve but if you develop fever, shortness of breath, light headedness or chest pain, please seek medical attention.    We are suggesting the following medications:  Furosemide 40mg BID    Please get the following tests done:  Continue daily weights and monitor your intake/output    You already have appointments with:  Dr. Max in Infectious Disease (4/13/17)  Dr. Richards in Neurosurgery (4/17/17)  Dr. Valdes in Cardiology (5/1/17)    Please bring up any concerns with  shortness of breath or fluid retention at these appointments.     It was a pleasure meeting with you.. Thank you for allowing me and my team the privilege of caring for you today. Please let us know if there is anything else we can do for you so that we can be sure you are leaving completely satisfied with your care experience.    Your hospital unit at the time of discharge is 6C so if you have any questions please call the hospital at [phone] and ask to talk to a nurse.      Take care!    Derek Navas DDS  Oral & Maxillofacial Surgery  PGY-1                Medication Review      START taking        Dose / Directions Comments    benzonatate 100 MG capsule   Commonly known as:  TESSALON   Used for:  Acute on chronic systolic congestive heart failure (H)        Dose:  100 mg   Take 1 capsule (100 mg) by mouth 3 times daily as needed for cough   Quantity:  42 capsule   Refills:  0        cefTRIAXone 2 GM vial   Commonly known as:  ROCEPHIN   Indication:  Infection of Bone and Bone Marrow   Used for:  Epidural abscess   Replaces:  CEFTRIAXONE SODIUM IV        Dose:  2 g   Inject 2 g into the vein every 12 hours   Quantity:  14 each   Refills:  0        oseltamivir 75 MG capsule   Commonly known as:  TAMIFLU   Indication:  Treatment to Prevent Influenza        Dose:  75 mg   Take 1 capsule (75 mg) by mouth daily   Quantity:  30 capsule   Refills:  0    Per TCU protocol         CONTINUE these medications which may have CHANGED, or have new prescriptions. If we are uncertain of the size of tablets/capsules you have at home, strength may be listed as something that might have changed.        Dose / Directions Comments    CALAZIME SKIN PROTECTANT Pste   This may have changed:  how much to take   Used for:  Epidural abscess        Dose:  1 Application   Apply 1 Application topically daily   Quantity:  1 Tube   Refills:  0        cholecalciferol 1000 UNITS Tabs   This may have changed:  medication strength   Used for:   Loop diuretic causing adverse effect in therapeutic use, subsequent encounter        Dose:  1000 Units   Take 1,000 Units by mouth daily   Quantity:  30 tablet   Refills:  0        furosemide 40 MG tablet   Commonly known as:  LASIX   This may have changed:  when to take this   Used for:  Acute on chronic systolic congestive heart failure (H)        Dose:  40 mg   Take 1 tablet (40 mg) by mouth 2 times daily   Quantity:  30 tablet   Refills:  0        metroNIDAZOLE 500 MG tablet   Commonly known as:  FLAGYL   Indication:  Infection of Bone and Bone Marrow   This may have changed:    - medication strength  - when to take this   Used for:  Epidural abscess        Dose:  500 mg   Take 1 tablet (500 mg) by mouth every 8 hours   Quantity:  20 tablet   Refills:  0        nitroglycerin 0.4 MG sublingual tablet   Commonly known as:  NITROSTAT   This may have changed:    - medication strength  - how much to take  - how to take this  - when to take this  - reasons to take this  - additional instructions   Used for:  Atrial flutter, unspecified type (H)        For chest pain place 1 tablet under the tongue every 5 minutes for 3 doses. If symptoms persist 5 minutes after 1st dose call 911.   Quantity:  25 tablet   Refills:  0        Vitamin C 500 MG Caps   This may have changed:  medication strength   Used for:  Type 2 diabetes mellitus with diabetic polyneuropathy, without long-term current use of insulin (H)        Dose:  500 mg   Take 500 mg by mouth daily   Quantity:  30 capsule   Refills:  0          CONTINUE these medications which have NOT CHANGED        Dose / Directions Comments    acetaminophen 325 MG tablet   Commonly known as:  TYLENOL   Used for:  Acute midline low back pain, with sciatica presence unspecified        Dose:  650 mg   Take 2 tablets (650 mg) by mouth every 4 hours as needed for other (surgical pain)   Quantity:  100 tablet   Refills:  0        aspirin 325 MG tablet   Used for:  Acute midline low back  pain, with sciatica presence unspecified        Dose:  325 mg   Take 1 tablet (325 mg) by mouth daily   Quantity:  120 tablet   Refills:  0        atorvastatin 40 MG tablet   Commonly known as:  LIPITOR   Used for:  Claudication of left lower extremity (H)        Dose:  40 mg   Take 1 tablet (40 mg) by mouth daily   Quantity:  30 tablet   Refills:  1        clopidogrel 75 MG tablet   Commonly known as:  PLAVIX   Used for:  Atrial flutter, unspecified type (H)        Dose:  75 mg   Take 1 tablet (75 mg) by mouth daily   Quantity:  30 tablet   Refills:  0        insulin glargine 100 UNIT/ML injection   Commonly known as:  LANTUS   Used for:  Epidural abscess        Dose:  22 Units   Inject 22 Units Subcutaneous every morning (before breakfast)   Quantity:  30 mL   Refills:  0        lisinopril 10 MG tablet   Commonly known as:  PRINIVIL/ZESTRIL   Used for:  Essential hypertension        Dose:  10 mg   Take 1 tablet (10 mg) by mouth daily   Quantity:  30 tablet   Refills:  0        melatonin 3 MG tablet   Used for:  Primary insomnia        Dose:  3 mg   Take 1 tablet (3 mg) by mouth nightly as needed   Refills:  0        metFORMIN 500 MG tablet   Commonly known as:  GLUCOPHAGE   Used for:  Type 2 diabetes mellitus with diabetic polyneuropathy, without long-term current use of insulin (H)        Dose:  500 mg   Take 1 tablet (500 mg) by mouth 2 times daily (with meals)   Quantity:  60 tablet   Refills:  0        metoprolol 25 MG tablet   Commonly known as:  LOPRESSOR   Used for:  Atrial flutter, unspecified type (H)        Dose:  75 mg   Take 3 tablets (75 mg) by mouth 2 times daily   Quantity:  60 tablet   Refills:  0        mirtazapine 15 MG tablet   Commonly known as:  REMERON   Used for:  Primary insomnia        Dose:  15 mg   Take 1 tablet (15 mg) by mouth At Bedtime   Quantity:  60 tablet   Refills:  0        order for DME   Used for:  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), Diabetic ulcer of left  foot due to type 2 diabetes mellitus (H)        Equipment being ordered: DH2 shoe   Quantity:  1 Device   Refills:  0        polyethylene glycol Packet   Commonly known as:  MIRALAX/GLYCOLAX   Used for:  Constipation, unspecified constipation type        Dose:  17 g   Take 17 g by mouth daily as needed for constipation   Quantity:  7 packet   Refills:  0        potassium chloride SA 20 MEQ CR tablet   Commonly known as:  K-DUR/KLOR-CON M   Used for:  Loop diuretic causing adverse effect in therapeutic use, subsequent encounter        Dose:  20 mEq   Take 1 tablet (20 mEq) by mouth daily   Quantity:  90 tablet   Refills:  0        ranitidine 150 MG tablet   Commonly known as:  ZANTAC   Used for:  Gastroesophageal reflux disease without esophagitis        Dose:  150 mg   Take 1 tablet (150 mg) by mouth 2 times daily   Quantity:  60 tablet   Refills:  0        senna-docusate 8.6-50 MG per tablet   Commonly known as:  SENOKOT-S;PERICOLACE   Used for:  Constipation, unspecified constipation type        Dose:  1-2 tablet   Take 1-2 tablets by mouth 2 times daily as needed (constipation )   Quantity:  100 tablet   Refills:  0        tamsulosin 0.4 MG capsule   Commonly known as:  FLOMAX   Used for:  Renal insufficiency        Dose:  0.8 mg   Take 2 capsules (0.8 mg) by mouth At Bedtime   Quantity:  60 capsule   Refills:  0          STOP taking     CEFTRIAXONE SODIUM IV   Replaced by:  cefTRIAXone 2 GM vial           nystatin 102495 UNIT/GM Powd   Commonly known as:  MYCOSTATIN                   After Care     Activity - Up with nursing assistance           Advance Diet as Tolerated       Follow this diet upon discharge:       Fluid restriction 1500 ML FLUID      Moderate Consistent CHO Diet       Daily weights       Call provider for weight gain of more than 2 pounds per day or 5 pounds per week.       General info for SNF       Length of Stay Estimate: short term care  Condition at Discharge: improving  Level of care:  "skilled   Rehabilitation Potential: good  Admission H&P remains valid and up-to-date: yes  Recent Chemotherapy: no  Use Nursing Home Standing Orders: {standing orders:[id number]             General Recommendations To Control Heart Failure When You Get Home (Give at DC from TCU)     Instructions To Patients and Families: Please read and check off each of these important instructions as you do them when you get home.           Weight and symptoms      ___ Put a scale in your bathroom  ___ Post a weight chart or calendar next to the scale  ___Weigh yourself every day as soon as you you get up in the morning. You should only be wearing your pajamas. Write your weight on the chart/calendar.  ___ Bring your weight chart/calendar with you to all appointments    ___Call your doctor if you gain 2 pounds in 1 day or 5 pounds in 1 week from your \"dry\" weight (baseline weight). Also call your doctor if you have shortness of breath that gets worse over time, leg swelling or fatigue.         Medicines and diet     ___ Make sure to take your medicines as prescribed.    ___Bring a current list of your medicines and all of your medicine bottles with you to all appointments.    ___ Limit fluids if you still have swelling or shortness of breath, or if your doctor tells you to do so.  ___ Eat less than 2000 mg of sodium (salt) every day. Read food labels, and do not add salt to meals.   ___ Heart healthy diet with low fat and low cholesterol          Activity and suggested lifestyle changes    ___ Stay active. Talk to your doctor about an exercise program that is safe for your heart.    ___ Stop smoking. Reduce alcohol use.      ___ Lose weight if you are overweight. Extra weight puts a lot of stress on the heart.          Control for Leg Swelling   ___ Keep your legs elevated (raised) as needed for swelling. If swelling is uncomfortable or elevation doesn t help, ask your doctor about using ACE wrap or Jobst stockings.          Follow-up " appointments   ___ Make a [facility] appointment with a basic metabolic panel lab draw 3 to 5 days after you leave the hospital. Call one of the following locations:   [facility]  [phone],  [facility] [phone],  [facility]  [phone].     ___ Make sure to take your medications as prescribed and bring an accurate list of your medications and your weight chart/calendar to your follow up appointment at the [facility] for continued education and adjustments          What is the CORE clinic?    The [facility].O.R.[facility] (Cardiomyopathy, Optimization, Rehabilitation, Education) Clinic is a heart failure specialty clinic within the [facility]. At [facility] you will work with nurse practitioners to carefully adjust medicines, get education and learn who and when to call if symptoms appear. [facility] nurses specialize in helping you:    better understand your disease.    slow the progress of your disease.    improve the length and quality of your life.    detect future heart problems before they become life threatening.    avoid hospital stays.            Supplies     AntiEmbolism Stockings       Bilateral below knee length.On in the morning, off at night             Referrals     Occupational Therapy Adult Consult       Evaluate and treat as clinically indicated.    Reason: Continue physical therapy from previous stay (from spinal abscess)       Physical Therapy Adult Consult       Evaluate and treat as clinically indicated.    Reason: Continue physical therapy from previous stay (from spinal abscess)             Follow-Up Appointment Instructions     Follow Up ([facility])       Dr. Max in Infectious Disease (4/13/17)  Dr. Richards in Neurosurgery (4/17/17)  Dr. Valdes in Cardiology (5/1/17)    Appointments on [facility]/or [facility] (with [facility] or [facility] provider or service). Call  [phone] if you have any questions.             Your next 10 appointments already scheduled     Apr 13, 2017 11:00 AM CDT   (Arrive by 10:45 AM)   Return Visit with Gillian Max MD   [facility] and Infectious Diseases ([facility])    [address] [place] [address]   [phone]            Apr 17, 2017  4:30 PM CDT   (Arrive by 4:15 PM)   Return Visit with Marge Richards PA-C   [facility] Neurosurgery ([facility])    [address] [place] [address]   [phone]            May 01, 2017 11:00 AM CDT   (Arrive by 10:45 AM)   Return Visit with Clarisse Valdes MD   [facility] ([facility])    [address] [place] [id number]   [phone]              Statement of Approval     Ordered          04/08/17 1105  I have reviewed and agree with all the recommendations and orders detailed in this document.  EFFECTIVE NOW     Approved and electronically signed by:  Derek Navas MD                                                 INTERAGENCY TRANSFER FORM - NURSING   4/6/2017                       [address] [facility] BANK: [phone]            Attending Provider: SHANE Grissom MD     Allergies:  Blood Transfusion Related (Informational Only)    Infection:  None   Service:  CARDIOLOGY    Ht:  1.829 m (6')   Wt:  88.1 kg (194 lb 3.6 oz)   Admission Wt:  89.8 kg (198 lb)    BMI:  26.34 kg/m 2   BSA:  2.12 m 2            Advance Directives        Does patient have a scanned Advance Directive/ACP document in EPIC?           No        Immunizations     Name Date      Influenza (High Dose) 3 valent vaccine 11/29/16       ASSESSMENT     Discharge Profile Flowsheet     DISCHARGE NEEDS ASSESSMENT     Inspection  Full 04/08/17 1122    Equipment Currently Used at Home  walker, rolling 02/17/17 1421   Skin areas NOT inspected  Occiput;Hip, left;Hip,  "right 04/07/17 2208    Transportation Available  car 04/07/17 1125   Skin WDL  ex (open area left upper buttock/ Abrasion) 04/08/17 1122    Equipment Used at Home  none 01/11/14 1359   Skin Color/Characteristics  pale;redness blanchable 04/08/17 1126    GASTROINTESTINAL (ADULT,PEDIATRIC,OB)     Skin Temperature  warm 04/08/17 1126    GI WDL  WDL 04/08/17 1122   Skin Moisture  dry 04/08/17 1126    Last Bowel Movement  04/07/17 04/08/17 1122   Skin Elasticity  quick return to original state 04/08/17 1126    COMMUNICATION ASSESSMENT     Skin Integrity  other (see comments) 04/08/17 1126    Patient's communication style  spoken language (English or Bilingual) 04/06/17 0528   SAFETY      SKIN     Safety WDL  WDL 04/08/17 1126                 Assessment WDL (Within Defined Limits) Definitions           Safety WDL     Effective: 09/28/15    Row Information: <b>WDL Definition:</b> Bed in low position, wheels locked; call light in reach; upper side rails up x 2; ID band on<br> <font color=\"gray\"><i>Item=AS safety wdl>>List=AS safety wdl>>Version=F14</i></font>      Skin WDL     Effective: 09/28/15    Row Information: <b>WDL Definition:</b> Warm; dry; intact; elastic; without discoloration; pressure points without redness<br> <font color=\"gray\"><i>Item=AS skin wdl>>List=AS skin wdl>>Version=F14</i></font>      Vitals     Vital Signs Flowsheet     VITAL SIGNS     Weight  88.1 kg (194 lb 3.6 oz) (bed weight) 04/08/17 0820    Temp  97.7  F (36.5  C) 04/08/17 0820   BSA (Calculated - sq m)  2.07 04/06/17 2025    Temp src  Oral 04/08/17 0820   BMI (Calculated)  25.21 04/06/17 2025    Resp  18 04/08/17 0820   EKG MONITORING      Pulse  80 04/08/17 0820   Cardiac Regularity  Irregular 04/06/17 1042    Heart Rate  97 04/08/17 0820   Cardiac Rhythm  ST 04/06/17 1042    Pulse/Heart Rate Source  Monitor 04/08/17 0820   YOGI COMA SCALE      BP  103/70 04/08/17 0820   Best Eye Response  4-->(E4) spontaneous 04/06/17 1043    BP Location "  Left arm 04/08/17 0820   Best Motor Response  6-->(M6) obeys commands 04/06/17 1043    OXYGEN THERAPY     Best Verbal Response  5-->(V5) oriented 04/06/17 1043    SpO2  92 % 04/08/17 0820   [place] Coma Scale Score  15 04/06/17 1043    O2 Device  None (Room air) 04/08/17 0820   POSITIONING      Oxygen Delivery  1.5 LPM 04/08/17 0820   Body Position  independently positioning 04/08/17 0835    PAIN/COMFORT     Head of Bed (HOB)  HOB at 30-45 degrees 04/08/17 0835    Patient Currently in Pain  denies 04/08/17 0835   DAILY CARE      Preferred Pain Scale  CAPA (Clinically Aligned Pain Assessment) ([facility] Adults Only) 04/08/17 0835   Activity Type  up in chair 04/08/17 1126    CLINICALLY ALIGNED PAIN ASSESSMENT (CAPA) ([facility] ADULTS ONLY)     Activity Level of Assistance  assistance, 2 people 04/08/17 1126    Comfort  negligible pain 04/08/17 0835   ECG      HEIGHT AND WEIGHT     ECG Rhythm  Atrial flutter 04/08/17 0835    Height  1.829 m (6') 04/06/17 2025   Ectopy  None 04/08/17 0835    Height Method  Stated 04/06/17 0533                 Patient Lines/Drains/Airways Status    Active LINES/DRAINS/AIRWAYS     Name: Placement date: Placement time: Site: Days: Last dressing change:    Closed/Suction Drain 1 Back 10 [place] 02/12/17   1105   Back   55     Wound 01/20/17 Right Heel Other (comment) 01/20/17   1900   Heel   77     Wound 02/09/17 Posterior Coccyx Suspected pressure ulcer nonblanchable redness 02/09/17   0100   Coccyx   58     Wound 02/15/17 Mid Coccyx Suspected pressure ulcer non blanchable open area 02/15/17   2017   Coccyx   51     Wound 02/19/17 Right Buttocks open area on right buttock 02/19/17   1430   Buttocks   47     Wound 03/08/17 Lateral;Right Foot 2 scabbed areas on plantar area on smallest toe and below that. 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 03/08/17   0859   Foot   31     Rash 03/14/17 2000 Bilateral anterior groin other (see comments) 03/14/17 2000    24      Incision/Surgical Site 02/01/16 Neck 02/01/16   1333    431     Incision/Surgical Site 02/01/16 Right Chest 02/01/16   1350    431     Incision/Surgical Site 02/09/17 Midline;Mid Back 02/09/17   0341    58     Incision/Surgical Site 02/09/17 Bilateral Back 02/09/17   0354    58     Incision/Surgical Site 02/12/17 Mid Back 02/12/17   1125    55     Incision/Surgical Site 02/15/17 Mid Back 02/15/17   2016    51             Patient Lines/Drains/Airways Status    Active PICC/CVC     Name: Placement date: Placement time: Site: Days: Additional Info Last dressing change:    PICC Double Lumen 02/10/17 Right Basilic 02/10/17   1217   Basilic   56 Internal Lumen Volume (mL): 44 mL            External Cath Length (cm): 3 cm            Size (Fr): 5 Fr            Orientation: Right            Extremity Circumference (cm): 32 cm            Catheter Brand: Nextinit Power Picc            Dressing Intervention: Chlorhexidine patch;Transparent;Securing device            Description: Power PICC            Total Catheter Length (cm) Trimmed: 47 cm            Site Prep: Chlorhexidine            Local Anesthetic: Injectable            Inserted by: Lela Marquez RN            Insertion attempts without ultrasound: 1            Patient Tolerance: Tolerated well            Placement Verification: Xray;Blood Return            Difficulty with threading line: No            Tip location: SVC/RA Junction            Full barrier precautions done: Yes            Consent Signed: Yes            Time Out performed: Yes            Lot #: [id number]            Use for : Medication Administration               Intake/Output Detail Report     Date Intake     Output Net    Shift P.O. I.V. IV Piggyback Total Urine Total       Day 04/07/17 0000 - 04/07/17 0659 -- 100 -- 100 175 175 -75    Ashwini 04/07/17 0700 - 04/07/17 1459 600 20 -- 620 770 770 -150    Noc 04/07/17 1500 - 04/07/17 2359 175 -- -- 175 700 700 -525    Day 04/08/17 0000 - 04/08/17 0659 -- -- -- --  -- -- 0    Ashwini 04/08/17 0700 - 04/08/17 1459 360 -- -- 360 -- -- 360      Last Void/BM       Most Recent Value    Urine Occurrence 1 at 04/08/2017 0600    Stool Occurrence 1 at 04/07/2017 1917      Case Management/Discharge Planning     Case Management/Discharge Planning Flowsheet     REFERRAL INFORMATION     FINAL RESOURCES      Arrived From  admitted as an inpatient 02/17/17 1421   Equipment Currently Used at Home  walker, rolling 02/17/17 1421    LIVING ENVIRONMENT     [facility] CAREGIVER      Lives With  significant other 04/07/17 1125   Filed Complexity Screen Score  11 04/07/17 0907    Living Arrangements  apartment 04/07/17 1125   ABUSE RISK SCREEN      Provides Primary Care For  other (see comments) (self) 01/13/14 1435   QUESTION TO PATIENT:  Has a member of your family or a partner(now or in the past) intimidated, hurt, manipulated, or controlled you in any way?  no 04/06/17 0533    COPING/STRESS     QUESTION TO PATIENT: Do you feel safe going back to the place where you are living?  yes 04/06/17 0533    Major Change/Loss/Stressor  surgery/procedure (spinal) 04/06/17 2037   OBSERVATION: Is there reason to believe there has been maltreatment of a vulnerable adult (ie. Physical/Sexual/Emotional abuse, self neglect, lack of adequate food, shelter, medical care, or financial exploitation)?  no 04/06/17 0533    Patient Personal Strengths  assertive;humor;motivated;positive attitude;strong support system 01/13/14 1435   (R) MENTAL HEALTH SUICIDE RISK      DISCHARGE PLANNING     Are you depressed or being treated for depression?  No 04/06/17 2037    Transportation Available  car 04/07/17 1125   HOMICIDE RISK      Equipment Used at Home  none 01/11/14 1359   Homicidal Ideation  no 04/06/17 0533                  [facility]: [phone]            Medication Administration Report for Tad Manning as of 04/08/17 1128   Legend:    Given Hold Not Given Due Canceled Entry Other Actions    Time Time  (Time) Time  Time-Action       Inactive    Active    Linked        Medications 04/02/17 04/03/17 04/04/17 04/05/17 04/06/17 04/07/17 04/08/17    acetaminophen (TYLENOL) tablet 650 mg  Dose: 650 mg Freq: EVERY 4 HOURS PRN Route: PO  PRN Reason: other  PRN Comment: surgical pain  Start: 04/06/17 2015   Admin Instructions: Maximum acetaminophen dose from all sources = 75 mg/kg/day not to exceed 4 grams/day.               ascorbic acid (VITAMIN C) tablet 500 mg  Dose: 500 mg Freq: DAILY Route: PO  Start: 04/07/17 0800         0856 (500 mg)-Given        0837 (500 mg)-Given           aspirin tablet 325 mg  Dose: 325 mg Freq: DAILY Route: PO  Start: 04/07/17 0800         0855 (325 mg)-Given        0837 (325 mg)-Given           atorvastatin (LIPITOR) tablet 40 mg  Dose: 40 mg Freq: AT BEDTIME Route: PO  Start: 04/06/17 2200        2219 (40 mg)-Given        2113 (40 mg)-Given        [ ] 2200           benzonatate (TESSALON) capsule 100 mg  Dose: 100 mg Freq: 3 TIMES DAILY PRN Route: PO  PRN Reason: cough  Start: 04/07/17 0824              cefTRIAXone (ROCEPHIN) 2 g vial to attach to  ml bag for ADULTS or NS 50 ml bag for PEDS  Dose: 2 g Freq: EVERY 12 HOURS Route: IV  Indications of Use: OSTEOMYELITIS  Last Dose: 2 g (04/07/17 0604)  Start: 04/06/17 1635        1724 (2 g)-New Bag       1810-Stopped        0604 (2 g)-New Bag       1716 (2 g)-New Bag        0612 (2 g)-New Bag       [ ] 1800           cholecalciferol (vitamin D) tablet 1,000 Units  Dose: 1,000 Units Freq: DAILY Route: PO  Start: 04/07/17 0800         0856 (1,000 Units)-Given        0837 (1,000 Units)-Given           clopidogrel (PLAVIX) tablet 75 mg  Dose: 75 mg Freq: DAILY Route: PO  Start: 04/07/17 0800         0857 (75 mg)-Given        0836 (75 mg)-Given           furosemide (LASIX) tablet 40 mg  Dose: 40 mg Freq: 2 TIMES DAILY. Route: PO  Start: 04/08/17 0800          0837 (40 mg)-Given       [ ] 1600           glucose 40 % gel 15-30 g  Dose: 15-30 g  Freq: EVERY 15 MIN PRN Route: PO  PRN Reason: low blood sugar  Start: 04/06/17 2015   Admin Instructions: Give 15 g for BG 51 to 69 mg/dL IF patient is conscious and able to swallow. Give 30 g for BG less than or equal to 50 mg/dL IF patient is conscious and able to swallow. Do NOT give glucose gel via enteral tube.  IF patient has enteral tube: give apple juice 120 mL (4 oz or 15 g of CHO) via enteral tube for BG 51 to 69 mg/dL.  Give apple juice 240 mL (8 oz or 30 g of CHO) via enteral tube for BG less than or equal to 50 mg/dL.              Or  dextrose 50 % injection 25-50 mL  Dose: 25-50 mL Freq: EVERY 15 MIN PRN Route: IV  PRN Reason: low blood sugar  Start: 04/06/17 2015   Admin Instructions: Use if have IV access, BG less than 70 mg/dL and meet dose criteria below:  Dose if conscious and alert (or disorientated) and NPO = 25 mL  Dose if unconscious / not alert = 50 mL  Vesicant.              Or  glucagon injection 1 mg  Dose: 1 mg Freq: EVERY 15 MIN PRN Route: SC  PRN Reason: low blood sugar  PRN Comment: May repeat x 1 only  Start: 04/06/17 2015   Admin Instructions: May give SQ or IM. IF BG less than or equal to 50 mg/dL and no IV access.  ONLY use glucagon IF patient has NO IV access AND is UNABLE to swallow.               heparin sodium PF injection 5,000 Units  Dose: 5,000 Units Freq: EVERY 12 HOURS Route: SC  Start: 04/06/17 2030   Admin Instructions: HOLD heparin IF platelet count falls below 50% baseline or less than 100,000 /  L and notify provider. Use this product If CrCl less than 30 mL/min.         2110 (5,000 Units)-Given        0855 (5,000 Units)-Given       2106 (5,000 Units)-Given        0838 (5,000 Units)-Given       [ ] 2030           insulin aspart (NovoLOG) inj (RAPID ACTING)  Dose: 1-5 Units Freq: AT BEDTIME Route: SC  Start: 04/06/17 2200   Admin Instructions: MEDIUM INSULIN RESISTANCE DOSING    Do Not give Bedtime Correction Insulin if BG less than  200.   For  - 249 give 1  units.   For  - 299 give 2 units.   For  - 349 give 3 units.   For  -399 give 4 units.   For BG greater than or equal to 400 give 5 units.  Notify provider if glucose greater than or equal to 350 mg/dL after administration of correction dose.  If given at mealtime, must be administered 5 min before meal or immediately after.         (2320)-Not Given        (2105)-Not Given [C]        [ ] 2200           insulin aspart (NovoLOG) inj (RAPID ACTING)  Dose: 1-7 Units Freq: 3 TIMES DAILY BEFORE MEALS Route: SC  Start: 04/07/17 0730   Admin Instructions: Correction Scale - MEDIUM INSULIN RESISTANCE DOSING     Do Not give Correction Insulin if Pre-Meal BG less than 140.   For Pre-Meal  - 189 give 1 unit.   For Pre-Meal  - 239 give 2 units.   For Pre-Meal  - 289 give 3 units.   For Pre-Meal  - 339 give 4 units.   For Pre-Meal - 399 give 5 units.   For Pre-Meal -449 give 6 units  For Pre-Meal BG greater than or equal to 450 give 7 units.   To be given with prandial insulin, and based on pre-meal blood glucose.    Notify provider if glucose greater than or equal to 350 mg/dL after administration of correction dose.  If given at mealtime, must be administered 5 min before meal or immediately after.          (0730)-Not Given       (1230)-Not Given       (1710)-Not Given        (0844)-Not Given       [ ] 1200       [ ] 1700           insulin glargine (LANTUS) injection 22 Units  Dose: 22 Units Freq: EVERY MORNING BEFORE BREAKFAST Route: SC  Start: 04/07/17 0730         0901 (22 Units)-Given        0844 (22 Units)-Given           lisinopril (PRINIVIL/ZESTRIL) tablet 10 mg  Dose: 10 mg Freq: DAILY Route: PO  Start: 04/07/17 0800         0859 (10 mg)-Given        0837 (10 mg)-Given           magnesium sulfate 4 g in 100 mL sterile water (premade)  Dose: 4 g Freq: EVERY 4 HOURS PRN Route: IV  PRN Reason: magnesium supplementation  Start: 04/06/17 2015   Admin Instructions: For  serum Mg++ less than 1.6 mg/dL  Give 4 g and recheck magnesium level 2 hours after dose, and next AM.               melatonin tablet 3 mg  Dose: 3 mg Freq: AT BEDTIME PRN Route: PO  PRN Reason: sleep  Start: 04/06/17 2015              metFORMIN (GLUCOPHAGE) tablet 500 mg  Dose: 500 mg Freq: 2 TIMES DAILY WITH MEALS Route: PO  Start: 04/06/17 2030   Admin Instructions: If the patient receives intravenous, iodinated contrast, hold metformin for 24 hours before AND 48 hours after procedure. Contact pharmacy if no hold order is written.         2110 (500 mg)-Given        0857 (500 mg)-Given       1716 (500 mg)-Given        0838 (500 mg)-Given       [ ] 1800           metoprolol (LOPRESSOR) tablet 75 mg  Dose: 75 mg Freq: 2 TIMES DAILY Route: PO  Start: 04/06/17 2030        2110 (75 mg)-Given        0856 (75 mg)-Given       2106 (75 mg)-Given        0837 (75 mg)-Given       [ ] 2000           metroNIDAZOLE (FLAGYL) tablet 500 mg  Dose: 500 mg Freq: EVERY 8 HOURS SCHEDULED Route: PO  Indications of Use: OSTEOMYELITIS  Start: 04/06/17 1638        1724 (500 mg)-Given       2219 (500 mg)-Given        0605 (500 mg)-Given       1429 (500 mg)-Given       2115 (500 mg)-Given        0612 (500 mg)-Given       [ ] 1400       [ ] 2200           mirtazapine (REMERON) tablet 15 mg  Dose: 15 mg Freq: AT BEDTIME Route: PO  Start: 04/06/17 2200        2220 (15 mg)-Given        2114 (15 mg)-Given        [ ] 2200           naloxone (NARCAN) injection 0.1-0.4 mg  Dose: 0.1-0.4 mg Freq: EVERY 2 MIN PRN Route: IV  PRN Reason: opioid reversal  Start: 04/06/17 2015   Admin Instructions: For respiratory rate LESS than or EQUAL to 8.  Partial reversal dose:  0.1 mg titrated q 2 minutes for Analgesia Side Effects Monitoring Sedation Level of 3 (frequently drowsy, arousable, drifts to sleep during conversation).Full reversal dose:  0.4 mg bolus for Analgesia Side Effects Monitoring Sedation Level of 4 (somnolent, minimal or no response to  stimulation).               ondansetron (ZOFRAN-ODT) ODT tab 4 mg  Dose: 4 mg Freq: EVERY 6 HOURS PRN Route: PO  PRN Reason: nausea  Start: 04/06/17 2015   Admin Instructions: This is Step 1 of nausea and vomiting management.  If nausea not resolved in 15 minutes, go to Step 2 prochlorperazine (COMPAZINE). Do not push through foil backing. Peel back foil and gently remove. Place on tongue immediately. Administration with liquid unnecessary              Or  ondansetron (ZOFRAN) injection 4 mg  Dose: 4 mg Freq: EVERY 6 HOURS PRN Route: IV  PRN Reasons: nausea,vomiting  Start: 04/06/17 2015   Admin Instructions: This is Step 1 of nausea and vomiting management.  If nausea not resolved in 15 minutes, go to Step 2 prochlorperazine (COMPAZINE).  Irritant.               oseltamivir (TAMIFLU) capsule 75 mg  Dose: 75 mg Freq: DAILY Route: PO  Indications of Use: INFLUENZA PROPHYLAXIS  Start: 04/08/17 1045          1122 (75 mg)-Given           polyethylene glycol (MIRALAX/GLYCOLAX) Packet 17 g  Dose: 17 g Freq: DAILY PRN Route: PO  PRN Reason: constipation  Start: 04/06/17 2015   Admin Instructions: 1 Packet = 17 grams. Mixed prescribed dose in 8 ounces of water. Follow with 8 oz. of water.               potassium chloride (KLOR-CON) Packet 20-40 mEq  Dose: 20-40 mEq Freq: EVERY 2 HOURS PRN Route: ORAL OR FEED  PRN Reason: potassium supplementation  Start: 04/06/17 2015   Admin Instructions: Use if unable to tolerate tablets.  If Serum K+ 3.0-3.3, dose = 60 mEq po total dose (40 mEq x1 followed in 2 hours by 20 mEq x1). Recheck K+ level 4 hours after dose and the next AM.  If Serum K+ 2.5-2.9, dose = 80 mEq po total dose (40 mEq Q2H x2). Recheck K+ level 4 hours after dose and the next AM.  If Serum K+ less than 2.5, See IV order.  Dissolve packet contents in 4-8 ounces of cold water or juice.               potassium chloride 10 mEq in 100 mL intermittent infusion  Dose: 10 mEq Freq: EVERY 1 HOUR PRN Route: IV  PRN Reason:  potassium supplementation  Start: 04/06/17 2015   Admin Instructions: Infuse via PERIPHERAL LINE or CENTRAL LINE. Use for central line replacement if patient weight less than 65 kg, if patient is on TPN with high potassium content or if unit does not stock 20 mEq bags.   If Serum K+ 3.0-3.3, dose = 10 mEq/hr x4 doses (40 mEq IV total dose). Recheck K+ level 2 hours after dose and the next AM.   If Serum K+ less than 3.0, dose = 10 mEq/hr x6 doses (60 mEq IV total dose). Recheck K+ level 2 hours after dose and the next AM.               potassium chloride 10 mEq in 100 mL intermittent infusion with 10 mg lidocaine  Dose: 10 mEq Freq: EVERY 1 HOUR PRN Route: IV  PRN Reason: potassium supplementation  Start: 04/06/17 2015   Admin Instructions: Infuse via PERIPHERAL LINE. Use potassium with lidocaine for pain with peripheral administration.  If Serum K+ 3.0-3.3, dose = 10 mEq/hr x4 doses (40 mEq IV total dose). Recheck K+ level 2 hours after dose and the next AM.  If Serum K+ less than 3.0, dose = 10 mEq/hr x6 doses (60 mEq IV total dose). Recheck K+ level 2 hours after dose and the next AM.               potassium chloride 20 mEq in 50 mL intermittent infusion  Dose: 20 mEq Freq: EVERY 1 HOUR PRN Route: IV  PRN Reason: potassium supplementation  Start: 04/06/17 2015   Admin Instructions: Infuse via CENTRAL LINE Only. May need EKG if less than 65 kg or on TPN - Max rate is 0.3 mEq/kg/hr for patients not on EKG monitoring.   If Serum K+ 3.0-3.3, dose = 20 mEq/hr x2 doses (40 mEq IV total dose). Recheck K+ level 2 hours after dose and the next AM.  If Serum K+ less than 3.0, dose = 20 mEq/hr x3 doses (60 mEq IV total dose). Recheck K+ level 2 hours after dose and the next AM.               potassium chloride SA (K-DUR/KLOR-CON M) CR tablet 20 mEq  Dose: 20 mEq Freq: DAILY Route: PO  Start: 04/06/17 1655   Admin Instructions: DO NOT CRUSH.         1724 (20 mEq)-Given        0855 (20 mEq)-Given        0838 (20  mEq)-Given           potassium chloride SA (K-DUR/KLOR-CON M) CR tablet 20-40 mEq  Dose: 20-40 mEq Freq: EVERY 2 HOURS PRN Route: PO  PRN Reason: potassium supplementation  Start: 04/06/17 2015   Admin Instructions: Use if able to take PO.   If Serum K+ 3.0-3.3, dose = 60 mEq po total dose (40 mEq x1 followed in 2 hours by 20 mEq x1). Recheck K+ level 4 hours after dose and the next AM.  If Serum K+ 2.5-2.9, dose = 80 mEq po total dose (40 mEq Q2H x2). Recheck K+ level 4 hours after dose and the next AM.  If Serum K+ less than 2.5, See IV order.  DO NOT CRUSH.               prochlorperazine (COMPAZINE) injection 5 mg  Dose: 5 mg Freq: EVERY 6 HOURS PRN Route: IV  PRN Reasons: nausea,vomiting  Start: 04/06/17 2015   Admin Instructions: This is Step 2 of nausea and vomiting management.   If nausea not resolved in 15 minutes, give metoclopramide (REGLAN) if ordered (step 3 of nausea and vomiting management)              Or  prochlorperazine (COMPAZINE) tablet 5 mg  Dose: 5 mg Freq: EVERY 6 HOURS PRN Route: PO  PRN Reason: vomiting  Start: 04/06/17 2015   Admin Instructions: This is Step 2 of nausea and vomiting management.   If nausea not resolved in 15 minutes, give metoclopramide (REGLAN) if ordered (step 3 of nausea and vomiting management)              Or  prochlorperazine (COMPAZINE) Suppository 12.5 mg  Dose: 12.5 mg Freq: EVERY 12 HOURS PRN Route: RE  PRN Reasons: nausea,vomiting  Start: 04/06/17 2015   Admin Instructions: This is Step 2 of nausea and vomiting management.   If nausea not resolved in 15 minutes, give metoclopramide (REGLAN) if ordered (step 3 of nausea and vomiting management)               ranitidine (ZANTAC) tablet 150 mg  Dose: 150 mg Freq: 2 TIMES DAILY Route: PO  Start: 04/06/17 2030        2110 (150 mg)-Given        0857 (150 mg)-Given       2106 (150 mg)-Given        0837 (150 mg)-Given       [ ] 2000           senna-docusate (SENOKOT-S;PERICOLACE) 8.6-50 MG per tablet 1-2 tablet  Dose:  1-2 tablet Freq: 2 TIMES DAILY PRN Route: PO  PRN Comment: constipation   Start: 04/06/17 2015              sodium chloride (PF) 0.9% PF flush 3 mL  Dose: 3 mL Freq: EVERY 8 HOURS Route: IV  Start: 04/06/17 0537   Admin Instructions: And Q1H PRN, to lock peripheral IV dormant line.                         2046 (20 mL)-Given              2319 (3 mL)-Given        0756 (20 mL)-Given       1603 (3 mL)-Given        0101 (3 mL)-Given       0853 (3 mL)-Given       [ ] 1600           tamsulosin (FLOMAX) capsule 0.8 mg  Dose: 0.8 mg Freq: AT BEDTIME Route: PO  Start: 04/06/17 2200   Admin Instructions: Administer 30 minutes after the same meal each day.  Capsules should be swallowed whole; do not crush chew or open.         2219 (0.8 mg)-Given        2133 (0.8 mg)-Given        [ ] 2200          Completed Medications  Medications 04/02/17 04/03/17 04/04/17 04/05/17 04/06/17 04/07/17 04/08/17         Dose: 40 mg Freq: ONCE Route: IV  Start: 04/06/17 1654   End: 04/06/17 1724        1724 (40 mg)-Given               Dose: 40 mg Freq: ONCE Route: IV  Start: 04/06/17 0748   End: 04/06/17 0812        0812 (40 mg)-Given               Dose: 20 mL Freq: ONCE Route: IK  Start: 04/08/17 0015   End: 04/08/17 0010          0010 (20 mL)-Given          Discontinued Medications  Medications 04/02/17 04/03/17 04/04/17 04/05/17 04/06/17 04/07/17 04/08/17         Dose: 2 g Freq: EVERY 12 HOURS Route: IV  Indications of Use: OSTEOMYELITIS  Start: 04/06/17 2030   End: 04/06/17 2042 2042-Med Discontinued           Dose: 40 mg Freq: 2 TIMES DAILY. Route: IV  Start: 04/07/17 0800   End: 04/07/17 1616         0855 (40 mg)-Given       1603 (40 mg)-Given       1616-Med Discontinued          Dose: 500 mg Freq: EVERY 8 HOURS SCHEDULED Route: PO  Indications of Use: OSTEOMYELITIS  Start: 04/06/17 2200   End: 04/06/17 2042 2042-Med Discontinued                  INTERAGENCY TRANSFER FORM - NOTES (H&P, Discharge Summary, Consults,  Procedures, Therapies)   4/6/2017                       UNIT [address] [facility] BANK: [phone]               History & Physicals      H&P by Derek Navas MD at 4/6/2017  3:06 PM     Author:  Derek Navas MD Service:  Cardiology Author Type:  Resident    Filed:  4/6/2017  4:51 PM Date of Service:  4/6/2017  3:06 PM Note Created:  4/6/2017  3:06 PM    Status:  Attested :  Derek Navas MD (Resident)    Cosigner:  SHANE Grissom MD at 4/7/2017  6:45 AM        Attestation signed by SHANE Grissom MD at 4/7/2017  6:45 AM        Attestation:  Patient was seen and evaluated with the team.Agree with H&P.                               [facility] Cardiology History and Physical    Tad Manning MRN# [id number]   Age: 76 year old YOB: 1940     Date of Admission:  4/6/2017    Primary care provider: Gene Guevara          Assessment and Plan:   Assessment:[LZ1.1]  Tad Manning is a 77 y/o male with history of CAD s/p three veseel CABG in 1995, cardiomyopathy with LVEF 25-30%, DM-II, CLL in remission, HTN and PVD s/p iliac artery stents and left lower extremity bypass who presents with shortness of breath[LZ1.2]    Plan:  #[LZ1.1] Acute respiratory failure with hypoxia   # Acute on chronic systolic heart failure  CXR revealed[LZ1.2] pulmonary edema[LZ1.3] and bilateral[LZ1.2] pleural effusions with  overlying atelectasis[LZ1.3].   - Furosemide IV 40mg BID  - Daily weights  - Strict I/O  - Monitor and replete lytes  - Supplemental oxygen ordered    # Atrial flutter with variable block  CHADSVASC score of 4, long-term anticoagulation would be preferable. 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  - Continue Ceftriaxone 2g q12h BID and Metronidazole 500mg TID  - Trend CBC and CRP    #  HTN  Stable on admission.  - Continue to trend BPs  - Continue home lisinopril[LZ1.2]    #[LZ1.1] DM-II  Well-controlled, with Hgb A1c @ 6.1 on 2/10/2017  - Continue Lantus 22U qAM  - Continue Metformin 500mg BID  - Hypoglycemic protocol ordered with SSI    # Peripheral vascular disease  S/p bilateral lower extremity stents  - Continue Plavix and aspirin[LZ1.2]    FEN:[LZ1.1]   -[LZ1.2] monitor and replete lytes[LZ1.1]  -[LZ1.2] cardiac diet[LZ1.1], 1.5L fluid restriction[LZ1.2]  Prophylaxis:[LZ1.1]   - DVT: subcutaneous heparin  - GI: none[LZ1.2]    Code Status: FULL     Disposition:[LZ1.1] Pending management of fluid overload[LZ1.2]    Patient seen and discussed with [LZ1.1] Ye[LZ1.2], who agrees with above plan.[LZ1.1]    Derek Navas DDS  Oral & Maxillofacial Surgery  PGY-1[LZ1.2]          Chief Complaint:[LZ1.1]   Difficulty breathing[LZ1.2]         History of Present Illness:[LZ1.1]   Tad Vega is a 76-year-old with history of considerable coronary artery and peripheral artery disease, diabetes and hypertension who presents from [facility] inpatient rehab due to shortness of breath.    Mr. Manning presented initially to [facility] 1/20/17 with lower extremity weakness and syncopal episodes felt to be linked with orthostatic hypotension and acute renal failure. Discharged home on the 1/25/17, presented back 2/7/17 and he was identified with thoracic diskitis and paraspinal abscess with spinal cord impingement. He transferred to [facility] 2/9/17 for further workup and management. He underwent surgical decompression and fusion, procedure as above on 2/12/17. Before surgery, he had almost complete leg paralysis. Post-surgically, he started to have some functional movement return. He was started on broad-spectrum antibiotics. The cultures subsequently grew moderate Streptococcus mitis along with various anaerobic species. Treatment modalities were  narrowed to IV ceftriaxone and a PICC line was placed. [facility]. The patient was continually followed by Infectious Disease who recommended that he continue ceftriaxone 2g q12h and metronidazole 500mg TID until 4/13/17.    The patient reports onset of dyspnea today with a dry, non-productive cough. He denied chest pain, fever/chills, swelling, or drainage. He does endorse bilateral leg swelling.[LZ1.2]         Review of Systems:[LZ1.1]   General: Patient reports limited activity recently. He denies weight loss, night sweats, fever/chills, or fatigue.[LZ1.2]  Skin:[LZ1.1] Patient d[LZ1.2]enies rash, scaling, or erythema.  HEENT:[LZ1.1] Patient d[LZ1.2]enies changes in vision,[LZ1.1] congestion, sore throat, or[LZ1.2] lymphadenopathy.  Cardio:[LZ1.1] Patient reports leg swelling. He d[LZ1.2]enies palpations[LZ1.1] or[LZ1.2] chest pain.  Respiratory:[LZ1.1] Patient reports shortness of breath, needing to sleep on incline, and cough.[LZ1.2]   GI:[LZ1.1] Patient d[LZ1.2]enies abdominal pain, diarrhea,[LZ1.1] or[LZ1.2] constipation.  :[LZ1.1] Patient d[LZ1.2]enies[LZ1.1] change in[LZ1.2] urinary frequency, dysuria,[LZ1.1] or[LZ1.2] hematuria.  MSK:[LZ1.1] Patient reports gait problem. He d[LZ1.2]enies weakness[LZ1.1] or pain in his extremities[LZ1.2].  Neuro:[LZ1.1] Patient reports weakness. He denies[LZ1.2] diminished sensation[LZ1.1].[LZ1.2]  Psych:[LZ1.1] Patient d[LZ1.2]enies hallucinations[LZ1.1] or[LZ1.2] delusion.  Heme:[LZ1.1] Patient d[LZ1.2]enies[LZ1.1] spontaneous[LZ1.2] bruising[LZ1.1] or[LZ1.2] bleeding.  Endo:[LZ1.1] Patient d[LZ1.2]enies intolerance to heat[LZ1.1]/cold, brittle hair, sugar intolerance[LZ1.2].         Past Medical History:     Past Cardiac History:[LZ1.1]  CAD s/p CABG 1995  Mild chronic left ventricular systolic dysfunction[LZ1.2]     Last Echocardiogram:[LZ1.1] 2/15/2017  Interpretation Summary  Technically difficult  study.The patient's rhythm is atrial fibrillation. The left ventricle is severely dilated. Left ventricular systolic function is severely reduced, ejection fraction is estimated at 25-30%. There is severe  global hypokinesis.    Right ventricular systolic function is mildly reduced. The right ventricular systolic pressure is approximated at 15 mmHg plus the  right atrial pressure. IVC is plethoric and estimated mean RA pressure is 15  MmHg. No pericardial effusion present.[LZ1.2]    Past Medical History:   Diagnosis Date     Arthritis      ASCVD (arteriosclerotic cardiovascular disease)      BMI 30.0-30.9,adult      BPH (benign prostatic hypertrophy)      Cellulitis      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)      Coronary artery disease     triple bypass 1995     Diabetes mellitus (H)      Diabetic polyneuropathy (H)      History of blood transfusion      Hyperlipidaemia      Hypertension      Malignant neoplasm (H)     CLL     Noninflammatory pericardial effusion      Osteomyelitis of left foot (H)      PVD (peripheral vascular disease) (H)      Sebaceous cyst[LZ1.4]              Past Surgical History:[LZ1.1]     Past Surgical History:   Procedure Laterality Date     AMPUTATE TOE(S)  1/10/2014    Procedure: AMPUTATE TOE(S);;  Surgeon: Amador Vick MD;  Location:  OR     BONE MARROW BIOPSY, BONE SPECIMEN, NEEDLE/TROCAR  10/19/2012    Procedure: BIOPSY BONE MARROW;  BONE MARROW BIOPSY  (CONSCIOUS SED);  Surgeon: Ramon Quesada MD;  Location:  GI     BYPASS GRAFT FEMOROPOPLITEAL  1/10/2014    Procedure: BYPASS GRAFT FEMOROPOPLITEAL;  Left above knee popliteal to  Below knee popliteal bypass using transverse saphenous vein graft. Left 2nd Toe amputation;  Surgeon: Amador Vick MD;  Location:  OR     CARDIAC SURGERY      angioplasty with stent, triple bypass     EXCISE CYST GENERIC (LOCATION) N/A 2/1/2016    Procedure: EXCISE CYST GENERIC (LOCATION);   Surgeon: Amador Vick MD;  Location: [facility]     GRAFT VEIN FROM EXTREMITY (LOCATION)  1/10/2014    Procedure: GRAFT VEIN FROM EXTREMITY (LOCATION);;  Surgeon: Amador Vick MD;  Location: [facility]     LAMINECTOMY THORACIC ONE LEVEL N/A 2/8/2017    Procedure: LAMINECTOMY THORACIC ONE LEVEL;  Surgeon: Marcelo Trent MD;  Location: [facility] OR     OPTICAL TRACKING SYSTEM FUSION POSTERIOR SPINE THORACIC THREE+ LEVELS N/A 2/12/2017    Procedure: OPTICAL TRACKING SYSTEM FUSION POSTERIOR SPINE THORACIC THREE+ LEVELS;  Surgeon: Marcelo Trent MD;  Location: [facility] OR     TONSILLECTOMY       VASCULAR SURGERY      ptcr legs[LZ1.4]             Social History:[LZ1.1]     Social History   Substance Use Topics     Smoking status: Former Smoker     Packs/day: 2.00     Years: 30.00     Types: Cigarettes     Quit date: 6/19/1995     Smokeless tobacco: Never Used     Alcohol use Yes      Comment: 1 drink monthly[LZ1.4]             Family History:   Family History reviewed.[LZ1.1]  Family History   Problem Relation Age of Onset     CANCER Mother      leukemia[LZ1.4]             Allergies:[LZ1.1]     Allergies   Allergen Reactions     Blood Transfusion Related (Informational Only) Other (See Comments)     Patient has a history of a clinically significant antibody against RBC antigens.  A delay in compatible RBCs may occur.[LZ1.4]              Medications:   Medications Reviewed.[LZ1.1]   Current Facility-Administered Medications   Medication     sodium chloride (PF) 0.9% PF flush 3 mL     Current Outpatient Prescriptions   Medication Sig     CEFTRIAXONE SODIUM IV Inject 2 g into the vein 2 times daily     METRONIDAZOLE PO Take 500 mg by mouth 3 times daily      Skin Protectants, Misc. (CALAZIME SKIN PROTECTANT EX) Apply topically daily     nystatin (MYCOSTATIN) 794938 UNIT/GM POWD      melatonin 3 MG tablet Take 1 tablet (3 mg) by mouth nightly as needed     mirtazapine (REMERON) 15 MG tablet Take 1 tablet (15 mg) by mouth  At Bedtime     insulin glargine (LANTUS) 100 UNIT/ML injection Inject 22 Units Subcutaneous every morning (before breakfast)     metFORMIN (GLUCOPHAGE) 500 MG tablet Take 1 tablet (500 mg) by mouth 2 times daily (with meals)     lisinopril (PRINIVIL/ZESTRIL) 10 MG tablet Take 1 tablet (10 mg) by mouth daily     metoprolol (LOPRESSOR) 25 MG tablet Take 3 tablets (75 mg) by mouth 2 times daily     furosemide (LASIX) 40 MG tablet Take 1 tablet (40 mg) by mouth daily     potassium chloride SA (K-DUR/KLOR-CON M) 20 MEQ CR tablet Take 1 tablet (20 mEq) by mouth daily     clopidogrel (PLAVIX) 75 MG tablet Take 1 tablet (75 mg) by mouth daily     ranitidine (ZANTAC) 150 MG tablet Take 1 tablet (150 mg) by mouth 2 times daily     acetaminophen (TYLENOL) 325 MG tablet Take 2 tablets (650 mg) by mouth every 4 hours as needed for other (surgical pain)     aspirin 325 MG tablet Take 1 tablet (325 mg) by mouth daily     polyethylene glycol (MIRALAX/GLYCOLAX) Packet Take 17 g by mouth daily as needed for constipation     senna-docusate (SENOKOT-S;PERICOLACE) 8.6-50 MG per tablet Take 1-2 tablets by mouth 2 times daily as needed (constipation )     tamsulosin (FLOMAX) 0.4 MG 24 hr capsule Take 0.8 mg by mouth At Bedtime      Cholecalciferol (VITAMIN D PO) Take 1,000 Units by mouth daily.      atorvastatin (LIPITOR) 40 MG tablet Take 1 tablet by mouth daily.     Ascorbic Acid (VITAMIN C PO) Take 500 mg by mouth daily.     Nitroglycerin (NITROQUICK SL) Place 0.4 mg under the tongue as needed.     order for DME Equipment being ordered: [address]     Facility-Administered Medications Ordered in Other Encounters   Medication     prochlorperazine (COMPAZINE) injection 5 mg[LZ1.4]             Physical Exam:   Vitals were reviewed.[LZ1.1]  Blood pressure 117/76, temperature 97.6  F (36.4  C), temperature source Axillary, resp. rate 28, height 1.829 m (6'), weight 89.8 kg (198 lb), SpO2 96 %.[LZ1.5]    Constitutional: He appears  well-developed and well-nourished. No distress.   HENT:   Head: Normocephalic and atraumatic.   Mouth/Throat: Oropharynx is clear and moist.   Eyes: Conjunctivae and EOM are normal.   Neck: Normal range of motion. Neck supple.   Cardiovascular: Normal rate, regular rhythm, normal heart sounds and intact distal pulses.   Pulmonary/Chest: Effort normal. No respiratory distress. He has rales.   Abdominal: Soft. There is no tenderness.   Musculoskeletal: He exhibits edema. He exhibits no tenderness.   Neurological: He is alert.   Skin: Skin is warm and dry.   Psychiatric: He has a normal mood and affect.[LZ1.2]          Data:[LZ1.1]   I/O last 3 completed shifts:  In: 300 [P.O.:300]  Out: 1750 [Urine:1750][LZ1.4]    ROUTINE LABS (Last four results)  CMP[LZ1.1]  Recent Labs  Lab 04/06/17  0537      POTASSIUM 3.6   CHLORIDE 108   CO2 25   ANIONGAP 10   *   BUN 10   CR 0.63*   GFRESTIMATED >90Non  GFR Calc   GFRESTBLACK >90African American GFR Calc   JUSTINO 8.0*   PROTTOTAL 5.9*   ALBUMIN 2.4*   BILITOTAL 0.2   ALKPHOS 154*   AST 10   ALT 17[LZ1.4]     CBC[LZ1.1]  Recent Labs  Lab 04/06/17  0537   WBC 6.1   RBC 3.71*   HGB 9.8*   HCT 33.6*   MCV 91   MCH 26.4*   MCHC 29.2*   RDW 18.0*   [LZ1.4]     Recent Results (from the past 24 hour(s))   Chest  XR, 1 view portable    Narrative    EXAM: XR CHEST PORT 1 VW  4/6/2017 6:50 AM      HISTORY: short of breath    COMPARISON: 3/12/2017    FINDINGS: The tip of the right upper extremity PICC is poorly  visualized but reaches at least the mid SVC. Sternotomy wires and  posterior thoracic hardware fusion. Scattered mediastinal surgical  clips.    The cardiac silhouette is enlarged, stable. Moderate layering right  pleural effusion with overlying atelectasis versus consolidation,  similar to prior. Partially visualized small left pleural effusion.  Patchy left retrocardiac airspace opacity, also similar prior. No  definite pneumothorax. Worsening  perihilar opacities.       Impression    IMPRESSION:   1. Worsening perihilar opacities, likely pulmonary edema.  2. Stable moderate right and small left pleural effusions with  overlying atelectasis versus consolidation. Stable left retrocardiac  airspace opacity of atelectasis versus infection.    I have personally reviewed the examination and initial interpretation  and I agree with the findings.    ANA ALEXANDER MD[LZ1.3]         EKG (17)  Sinus tachycardia  Left axis deviation  Nonspecific ST and T wave abnormality[LZ1.2]     Revision History        User Key Date/Time User Provider Type Action    > LZ1.3 2017  4:51 PM Derek Navas MD Resident Sign     LZ1.5 2017  4:06 PM Derek Navas MD Resident      LZ1.2 2017  3:44 PM Derek Navas MD Resident      LZ1.4 2017  3:07 PM Derek Navas MD Resident      LZ1.1 2017  3:06 PM Derek Navas MD Resident                   Discharge Summaries     No notes of this type exist for this encounter.         Consult Notes      Consults by Brandi Gonzales MSW at 2017  1:49 PM     Author:  Brandi Gonzales MSW Service:  Social Work Author Type:      Filed:  2017  1:49 PM Date of Service:  2017  1:49 PM Note Created:  2017  1:44 PM    Status:  Signed :  Brandi Gonzales MSW ()     Consult Orders:    1. Social Work IP Consult [[id number]] ordered by SHANE Grissom MD at 17 1109                Social Work: Assessment with Discharge Plan    Patient Name:  Tad Manning  :  1940  Age:  76 year old  MRN:  [id number]  Risk/Complexity Score:  Filed Complexity Screen Score: 11  Completed assessment with:  Pt, SNF admissions    Presenting Information   Reason for Referral:  Discharge plan  Date of Intake:  2017  Referral Source:  Chart Review and RNCC  Decision Maker:  Pt when able  Alternate Decision Maker:  In absence of HCD, NOK listed on Facesheet is brother  [facility] Directive:  Not on file  Living Situation:  House  Previous Functional Status:  Independent prior to spinal surgery  Patient and family understanding of hospitalization:  Pt states his goal is to return to [facility] then discharge to home.  Cultural/Language/Spiritual Considerations:  None reported.  Adjustment to Illness:  Pt is in a positive mood today.    Physical Health  Reason for Admission:    1. Dyspnea, unspecified type    2. Acute on chronic systolic congestive heart failure (H)    3. Atrial flutter, unspecified type (H)      Services Needed/Recommended:  TCU    Mental Health/Chemical Dependency  Diagnosis:  NA  Support/Services in Place:  NA  Services Needed/Recommended: NA    Support System  Significant relationship at present time:  Pt states family and friends involved.  Family of origin is available for support:  Yes  Other support available:  Yes  Gaps in support system:  Pt states he is not ready to go home and would like more therapy.  Patient is caregiver to:  None     Provider Information   Primary Care Physician:  Gene Guevara   [phone]   Clinic:  [address] / [facility]*      :  JASMYN    Financial   Income Source:  Retired  Financial Concerns:  None reported.  Provided education on Medicare SNF coverage.  Insurance:    Payor/Plan Subscriber Name Rel Member # Group #   MEDICARE - MEDICARE JOSHU[employer]ER BRAMBILA*  [id number]       ATTN CLAIMS, [address]   BCBS - [place]*  [id number]      [address] [address]       Discharge Plan   Patient and family discharge goal:  TCU  Provided education on discharge plan:  YES  Patient agreeable to discharge plan:  YES  A list of Medicare Certified Facilities was provided to the patient and/or family to encourage patient choice. Patient's choices for facility are:    Return to [facility]  PH: [phone]   F: [phone]    Will  [facility] provide Skilled rehabilitation or complex medical:  YES  General information regarding anticipated insurance coverage and possible out of pocket cost was discussed. Patient and patient's family are aware patient may incur the cost of transportation to the facility, pending insurance payment: YES  Barriers to discharge:  Medical stability    Discharge Recommendations   Anticipated Disposition:  Facility:  TCU - return to [facility]  Transportation Needs:  Medical:  Wheelchair  Name of Transportation Company and Phone:  [facility] as needed.    Additional comments   SW met with pt to discuss assessment and discharge planning.  Pt states he was at  TCU for about 35 days then transferred to [facility] for about 12 days.  Pt had questions/concerns regarding his Medicare coverage.  Pt states he has not been home since his admission to the hospital in February.  Pt states that he would like to complete his rehab at [facility] if able.  Pt states he would like to look into a private room versus a double room on return.  SW stated they would add this request to the referral.  Pt in agreement.  Unclear when discharge is planned.  SW will hand off to weekend SW to follow as needed.    KATHY Galarza, APSW  [address]   Phone: [phone]  Pager: [phone]  Unit: [phone][1.1]         Revision History        User Key Date/Time User Provider Type Action    > LH1.1 4/7/2017  1:49 PM Brandi Gonzales, MSW  Sign                     Progress Notes - Physician (Notes for yesterday and today)      Progress Notes by Derek Navas MD at 4/7/2017  3:50 PM     Author:  Derek Navas MD Service:  Cardiology Author Type:  Resident    Filed:  4/7/2017  4:16 PM Date of Service:  4/7/2017  3:50 PM Note Created:  4/7/2017  3:50 PM    Status:  Attested :  Derek Navas MD (Resident)    Cosigner:  SHANE Grissom MD at 4/8/2017  8:08 AM        Attestation signed by  SHANE Grissom MD at 4/8/2017  8:08 AM        Attestation:  Patient was seen and evaluated witht he team.Agree with progress note.                               Cardiology Progress Note[LZ1.1]  04/07/2017[LZ1.2]    Tad Manning MRN# [id number]   Age: 76 year old  Sex: male YOB: 1940  Date of Admission: 4/6/2017     No changes today EXCEPT:  - Furosemide IV 40mg BID today, transition to 40mg BID PO tomorrow  - Substantially improved CRP from previous ID follow-up  - Labs for tomorrow: BMP, Mag, CBC       Assessment and Plan:   Assessment:  Tad Manning is a 75 y/o male with history of CAD s/p three vessel CABG in 1995, cardiomyopathy with LVEF 25-30%, DM-II, CLL in remission, HTN and PVD s/p iliac artery stents and left lower extremity bypass who presents with shortness of breath     Plan:  # Acute respiratory failure with hypoxia   # Acute on chronic systolic heart failure  CXR revealed pulmonary edema and bilateral pleural effusions with  overlying atelectasis.   - Furosemide IV 40mg BID today, transition to oral 40mg BID tomorrow  - Daily weights  - Strict I/O  - Monitor and replete lytes  - Supplemental oxygen ordered     # Atrial flutter with variable block  CHADSVASC score of 4, long-term anticoagulation would be preferable. Patient was not started on warfarin during last admission due to fall risk and current concern for bleeding due to DAPT.   - Continue metoprolol 75mg for rate control.     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 CRP is 17.0 (4/7/17), down from 81.0 on 3/15/17,  - Continue Ceftriaxone 2g q12h BID and Metronidazole 500mg TID  - Trend CBC and CRP     # HTN  Stable on admission.  - Continue to trend BPs  - Continue home lisinopril     # DM-II  Well-controlled, with Hgb A1c @ 6.1 on 2/10/2017  - Continue Lantus 22U qAM  - Continue Metformin 500mg BID  - Hypoglycemic protocol ordered with SSI     # Peripheral  vascular disease  S/p bilateral lower extremity stents  - Continue Plavix and aspirin     FEN:   - monitor and replete lytes  - cardiac diet, 1.5L fluid restriction  Prophylaxis:   - DVT: subcutaneous heparin  - GI: none     Code Status: FULL      Disposition: Pending management of fluid overload, patient will discharge to TCU     Patient seen and discussed with Dr. Belcher, who agrees with above plan.     Derek Navas, ISAACS  Oral & Maxillofacial Surgery  PGY-1        Interval History:   No acute events overnight. Mr. Manning reports much improved breathing and that he has not needed the supplemental oxygen. He does endorse a stubborn dry cough, but reports that the swelling associated with his ankles is much better . He denies dysphagia or fevers/chills.    Review of symptoms otherwise negative.          Medications:   Reviewed. Please see EPIC for details.           Physical Exam:   Vitals were reviewed[LZ1.1]  Blood pressure 119/71, pulse 81, temperature 98.1  F (36.7  C), temperature source Oral, resp. rate 18, height 1.829 m (6'), weight 84.1 kg (185 lb 8 oz), SpO2 94 %.[LZ1.2]    Physical Exam:   General: Resting comfortably in bed, NAD  HEENT: EOMI, oropharynx clear and moist  CV: RRR, normal S1/S2, no murmur or rubs appreciated   Resp: Clear to auscultation bilaterally, no wheezes, rhonchi  Abd: Soft, non-tender, normoactive bowel sounds, no masses appreciated  Extremities: warm and well perfused, palpable pulses, no pedal edema  Neuro: AOx2 (not good with dates), normal mood and affect         Data:   ROUTINE LABS (Last four results)  CMP[LZ1.1]  Recent Labs  Lab 04/07/17  0757 04/06/17  0537    144   POTASSIUM 3.6 3.6   CHLORIDE 103 108   CO2 28 25   ANIONGAP 10 10   * 120*   BUN 9 10   CR 0.64* 0.63*   GFRESTIMATED >90Non  GFR Calc >90Non  GFR Calc   GFRESTBLACK >90African American GFR Calc >90African American GFR Calc   JUSTINO 8.4* 8.0*   PROTTOTAL  --  5.9*    ALBUMIN  --  2.4*   BILITOTAL  --  0.2   ALKPHOS  --  154*   AST  --  10   ALT  --  17[LZ1.2]     CBC[LZ1.1]  Recent Labs  Lab 04/07/17  0757 04/06/17  2047 04/06/17  0537   WBC 4.7  --  6.1   RBC 3.73*  --  3.71*   HGB 9.7*  --  9.8*   HCT 33.7*  --  33.6*   MCV 90  --  91   MCH 26.0*  --  26.4*   MCHC 28.8*  --  29.2*   RDW 18.1*  --  18.0*    180 171[LZ1.2]     I&O's:   Intake/Output Summary (Last 24 hours) at 04/07/17 1551  Last data filed at 04/07/17 1300   Gross per 24 hour   Intake              980 ml   Output             1865 ml   Net             -885 ml[LZ1.1]            Revision History        User Key Date/Time User Provider Type Action    > LZ1.2 4/7/2017  4:16 PM Derek Navas MD Resident Sign     LZ1.1 4/7/2017  3:50 PM Derek Navas MD Resident                   Procedure Notes     No notes of this type exist for this encounter.         Progress Notes - Therapies (Notes from 04/05/17 through 04/08/17)      Progress Notes by Melissa Schneider OT at 4/7/2017 11:25 AM     Author:  Melissa Schneider OT Service:  (none) Author Type:  Occupational Therapist    Filed:  4/7/2017 11:26 AM Date of Service:  4/7/2017 11:25 AM Note Created:  4/7/2017 11:25 AM    Status:  Signed :  Melissa Schneider OT (Occupational Therapist)          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   Self-Care   Dominant Hand right   Usual Activity Tolerance good   Current Activity Tolerance fair   Regular Exercise no   Functional Level Prior   Ambulation 3-->assistive equipment and person   Transferring 3-->assistive equipment and person   Toileting 3-->assistive equipment and person   Bathing 3-->assistive  equipment and person   Dressing 2-->assistive person   Eating 0-->independent   Communication 0-->understands/communicates without difficulty   Swallowing 0-->swallows foods/liquids without difficulty   Cognition 0 - no cognition issues reported   General Information   Onset of Illness/Injury or Date of Surgery - Date (dates back to February. back surgery)   Referring Physician Derek Navas MD   Patient/Family Goals Statement return to rehab as soon as possible   Additional Occupational Profile Info/Pertinent History of Current Problem Pt has extensive history, has been hospitalized or in rehab since early February. currently admitted from TCU with respiratory issues. Has had recent back surgery . PMHx: CAD, s/p CABG in 95, LVEF 25-30%. DMII, CLL in remission, HTN, PVD with LLE bypass.    Precautions/Limitations fall precautions   Weight-Bearing Status - LUE full weight-bearing   Weight-Bearing Status - RUE full weight-bearing   Weight-Bearing Status - LLE full weight-bearing   Weight-Bearing Status - RLE full weight-bearing   General Observations No restriction to wt. bearing, however unable to bear much wt. due to back injury and weakness   Cognitive Status Examination   Orientation orientation to person, place and time   Level of Consciousness alert   Able to Follow Commands WNL/WFL   Personal Safety (Cognitive) WNL/WFL   Memory intact   Attention No deficits were identified   Organization/Problem Solving No deficits were identified   Visual Perception   Visual Perception Wears glasses   Pain Assessment   Patient Currently in Pain No  (some pain with movement)   Integumentary/Edema   Integumentary/Edema other (describe)   Integumentary/Edema Comments some pain with movement   Posture   Posture Comments erect wihen seated EOB   Range of Motion (ROM)   ROM Comment Bilat UE WFL   Strength   Strength Comments Bilat UE 4+/5   Hand Strength   Hand Strength Comments Bilat  WFL   Muscle Tone Assessment   Muscle Tone  "Quick Adds No deficits were identified   Coordination   Coordination Comments Bilat FM - WFL   Mobility   Bed Mobility Comments independent to EOB   Grooming   Level of [place]: Grooming independent   Physical Assist/Nonphysical Assist: Grooming set-up required   Eating/Self Feeding   Level of [place]: Eating independent   Physical Assist/Nonphysical Assist: Eating set-up required   Instrumental Activities of Daily Living (IADL)   Previous Responsibilities meal prep;housekeeping;laundry;shopping;medication management;finances;driving;work   Activities of Daily Living Analysis   Impairments Contributing to Impaired Activities of Daily Living strength decreased;pain   General Therapy Interventions   Planned Therapy Interventions strengthening;ADL retraining   Clinical Impression   Criteria for Skilled Therapeutic Interventions Met yes, treatment indicated   OT Diagnosis s/p back surgery, respiratory failure   Influenced by the following impairments decreased strength and pain with movement   Assessment of Occupational Performance 1-3 Performance Deficits   Identified Performance Deficits decreased mobility, self-care   Clinical Decision Making (Complexity) Low complexity   Therapy Frequency 5 times/wk   Predicted Duration of Therapy Intervention (days/wks) 1 week   Anticipated Discharge Disposition Transitional Care Facility   Risks and Benefits of Treatment have been explained. Yes   Patient, Family & other staff in agreement with plan of care Yes   Clinical Impression Comments Pt has had extensive therapy, agreeable to strengthening and self-care activity while hospitalized   [facility] AM-PAC TM \"6 Clicks\"   2016, Trustees of [facility], under license to [employer].  All rights reserved.   6 Clicks Short Forms Daily Activity Inpatient Short Form   [facility] AM-PAC  \"6 Clicks\" Daily Activity Inpatient Short Form   1. Putting on and taking off regular lower body clothing? 2 - A Lot " "  2. Bathing (including washing, rinsing, drying)? 2 - A Lot   3. Toileting, which includes using toilet, bedpan or urinal? 3 - A Little   4. Putting on and taking off regular upper body clothing? 3 - A Little   5. Taking care of personal grooming such as brushing teeth? 3 - A Little   6. Eating meals? 4 - None   Daily Activity Raw Score (Score out of 24.Lower scores equate to lower levels of function) 17   Total Evaluation Time   Total Evaluation Time (Minutes) 6[DB1.1]        Revision History        User Key Date/Time User Provider Type Action    > DB1.1 4/7/2017 11:26 AM Melissa Schneider OT Occupational Therapist Sign                                                      INTERAGENCY TRANSFER FORM - LAB / IMAGING / EKG / EMG RESULTS   4/6/2017                       [address] [facility] BANK: [phone]            Unresulted Labs (24h ago through future)    Start       Ordered    04/09/17 0600  Platelet count  (Pharmacological Prophylaxis- heparin (If CrCl less than 30 mL/min)- UU,UR,UA,SH,RH,PH,WY)  EVERY THREE DAYS,   Routine     Comments:  Repeat every 3 days while on VTE prophylaxis. If no result is listed, this lab has not been done the past 365 days. LATEST LAB RESULT: Platelet Count (10e9/L)       Date                     Value                 04/06/2017               171              ----------      04/06/17 2015 04/08/17 0600  Basic metabolic panel  AM DRAW,   Routine      04/07/17 1610    04/08/17 0600  CBC with platelets  AM DRAW,   Routine     Comments:  Last Lab Result: Hemoglobin (g/dL)       Date                     Value                 04/07/2017               9.7 (L)          ----------    04/07/17 1610    04/08/17 0600  Magnesium  AM DRAW,   Routine      04/07/17 1610    Unscheduled  Potassium  (Potassium Replacement - \"Standard\" - For K levels less than 3.4 mmol/L - UU,UR,UA,RH,SH,PH,WY )  CONDITIONAL (SPECIFY),   Routine     Comments:  Obtain Potassium Level for these conditions:  *IF no potassium " "result within 24 hours before initiation of order set, draw potassium level with next lab collect.    *2 HOURS AFTER last IV potassium replacement dose and 4 hours after an oral replacement dose.  *Next morning after potassium dose.     Repeat Potassium Replacement if necessary.    04/06/17 2015    Unscheduled  Magnesium  (Magnesium Replacement -  Adult - \"Standard\" - Replacement for all levels less than 1.6 mg/dL )  CONDITIONAL (SPECIFY),   Routine     Comments:  Obtain Magnesium Level for these conditions:  *IF no magnesium result within 24 hrs before initiation of order set, draw magnesium level with next lab collect.    *2 HOURS AFTER last magnesium replacement dose when magnesium replacement given for level less than 1.6   *Next morning after magnesium dose.     Repeat Magnesium Replacement if necessary.    04/06/17 2015         Lab Results - 3 Days      Basic metabolic panel [[id number]] (Abnormal)  Resulted: 04/08/17 1017, Result status: Final result    Ordering provider: Derek Navas MD  04/08/17 0000 Resulting lab: [facility]    Specimen Information    Type Source Collected On   Blood  04/08/17 0927          Components       Value Reference Range Flag Lab   Sodium 144 133 - 144 mmol/L  51   Potassium 3.5 3.4 - 5.3 mmol/L  51   Chloride 106 94 - 109 mmol/L  51   Carbon Dioxide 29 20 - 32 mmol/L  51   Anion Gap 9 3 - 14 mmol/L  51   Glucose 119 70 - 99 mg/dL H 51   Urea Nitrogen 9 7 - 30 mg/dL  51   Creatinine 0.63 0.66 - 1.25 mg/dL L 51   GFR Estimate -- >60 mL/min/1.7m2  51   Result:         >90  Non  GFR Calc     GFR Estimate If Black -- >60 mL/min/1.7m2  51   Result:         >90   GFR Calc     Calcium 8.1 8.5 - 10.1 mg/dL L 51   Result:              Magnesium [[id number]] (Abnormal)  Resulted: 04/08/17 1017, Result status: Final result    Ordering provider: Derek Navas MD  04/08/17 0000 Resulting lab: [facility]" [place]    Specimen Information    Type Source Collected On   Blood  04/08/17 0927          Components       Value Reference Range Flag Lab   Magnesium 1.4 1.6 - 2.3 mg/dL L 51            CBC with platelets [[id number]] (Abnormal)  Resulted: 04/08/17 0943, Result status: Final result    Ordering provider: Derek Navas MD  04/08/17 0000 Resulting lab: [facility]    Specimen Information    Type Source Collected On   Blood  04/08/17 0927          Components       Value Reference Range Flag Lab   WBC 6.2 4.0 - 11.0 10e9/L  51   RBC Count 3.78 4.4 - 5.9 10e12/L L 51   Hemoglobin 10.0 13.3 - 17.7 g/dL L 51   Hematocrit 34.1 40.0 - 53.0 % L 51   MCV 90 78 - 100 fl  51   MCH 26.5 26.5 - 33.0 pg  51   MCHC 29.3 31.5 - 36.5 g/dL L 51   RDW 18.4 10.0 - 15.0 % H 51   Platelet Count 162 150 - 450 10e9/L  51            Glucose by meter [[id number]]  Resulted: 04/08/17 0846, Result status: Final result    Ordering provider: SHANE Grissom MD  04/08/17 0843 Resulting lab: POINT OF CARE TEST, GLUCOSE    Specimen Information    Type Source Collected On     04/08/17 0843          Components       Value Reference Range Flag Lab   Glucose 98 70 - 99 mg/dL  170            Glucose by meter [[id number]] (Abnormal)  Resulted: 04/08/17 0326, Result status: Final result    Ordering provider: SHANE Grissom MD  04/08/17 0319 Resulting lab: POINT OF CARE TEST, GLUCOSE    Specimen Information    Type Source Collected On     04/08/17 0319          Components       Value Reference Range Flag Lab   Glucose 109 70 - 99 mg/dL H 170            Potassium [[id number]] (Abnormal)  Resulted: 04/08/17 0057, Result status: Final result    Ordering provider: Derek Navas MD  04/07/17 1818 Resulting lab: [facility]    Specimen Information    Type Source Collected On   Blood  04/08/17 0014          Components       Value Reference Range Flag Lab   Potassium 3.2 3.4 - 5.3 mmol/L L 51             Magnesium [[id number]] (Abnormal)  Resulted: 04/08/17 0057, Result status: Final result    Ordering provider: Derek Navas MD  04/07/17 1818 Resulting lab: [facility]    Specimen Information    Type Source Collected On   Blood  04/08/17 0014          Components       Value Reference Range Flag Lab   Magnesium 1.4 1.6 - 2.3 mg/dL L 51            Glucose by meter [[id number]] (Abnormal)  Resulted: 04/07/17 2116, Result status: Final result    Ordering provider: SHANE Grissom MD  04/07/17 2105 Resulting lab: POINT OF CARE TEST, GLUCOSE    Specimen Information    Type Source Collected On     04/07/17 2105          Components       Value Reference Range Flag Lab   Glucose 135 70 - 99 mg/dL H 170            Glucose by meter [[id number]] (Abnormal)  Resulted: 04/07/17 1707, Result status: Final result    Ordering provider: SHANE Grissom MD  04/07/17 1702 Resulting lab: POINT OF CARE TEST, GLUCOSE    Specimen Information    Type Source Collected On     04/07/17 1702          Components       Value Reference Range Flag Lab   Glucose 130 70 - 99 mg/dL H 170            Glucose by meter [[id number]] (Abnormal)  Resulted: 04/07/17 1156, Result status: Final result    Ordering provider: SHANE Grissom MD  04/07/17 1154 Resulting lab: POINT OF CARE TEST, GLUCOSE    Specimen Information    Type Source Collected On     04/07/17 1154          Components       Value Reference Range Flag Lab   Glucose 138 70 - 99 mg/dL H 170            Erythrocyte sedimentation rate auto [[id number]]  Resulted: 04/07/17 0947, Result status: Final result    Ordering provider: Derek Navas MD  04/07/17 0002 Resulting lab: [facility]    Specimen Information    Type Source Collected On   Blood  04/07/17 0757          Components       Value Reference Range Flag Lab   Sed Rate 16 0 - 20 mm/h  51            Basic metabolic panel [[id number]] (Abnormal)  Resulted: 04/07/17  0908, Result status: Final result    Ordering provider: Derek Navas MD  04/07/17 0002 Resulting lab: [facility]    Specimen Information    Type Source Collected On   Blood  04/07/17 0757          Components       Value Reference Range Flag Lab   Sodium 141 133 - 144 mmol/L  51   Potassium 3.6 3.4 - 5.3 mmol/L  51   Chloride 103 94 - 109 mmol/L  51   Carbon Dioxide 28 20 - 32 mmol/L  51   Anion Gap 10 3 - 14 mmol/L  51   Glucose 131 70 - 99 mg/dL H 51   Urea Nitrogen 9 7 - 30 mg/dL  51   Creatinine 0.64 0.66 - 1.25 mg/dL L 51   GFR Estimate -- >60 mL/min/1.7m2  51   Result:         >90  Non  GFR Calc     GFR Estimate If Black -- >60 mL/min/1.7m2  51   Result:         >90   GFR Calc     Calcium 8.4 8.5 - 10.1 mg/dL L 51   Result:              CRP inflammation [[id number]] (Abnormal)  Resulted: 04/07/17 0908, Result status: Final result    Ordering provider: Derek Navas MD  04/07/17 0002 Resulting lab: [facility]    Specimen Information    Type Source Collected On   Blood  04/07/17 0757          Components       Value Reference Range Flag Lab   CRP Inflammation 17.0 0.0 - 8.0 mg/L H 51            CBC with platelets [[id number]] (Abnormal)  Resulted: 04/07/17 0900, Result status: Final result    Ordering provider: Derek Navas MD  04/07/17 0002 Resulting lab: [facility]    Specimen Information    Type Source Collected On   Blood  04/07/17 0757          Components       Value Reference Range Flag Lab   WBC 4.7 4.0 - 11.0 10e9/L  51   RBC Count 3.73 4.4 - 5.9 10e12/L L 51   Hemoglobin 9.7 13.3 - 17.7 g/dL L 51   Hematocrit 33.7 40.0 - 53.0 % L 51   MCV 90 78 - 100 fl  51   MCH 26.0 26.5 - 33.0 pg L 51   MCHC 28.8 31.5 - 36.5 g/dL L 51   RDW 18.1 10.0 - 15.0 % H 51   Platelet Count 180 150 - 450 10e9/L  51            Glucose by meter [[id number]] (Abnormal)  Resulted: 04/07/17 0701, Result  status: Final result    Ordering provider: SHANE Grissom MD  04/07/17 0654 Resulting lab: POINT OF CARE TEST, GLUCOSE    Specimen Information    Type Source Collected On     04/07/17 0654          Components       Value Reference Range Flag Lab   Glucose 105 70 - 99 mg/dL H 170            Glucose by meter [[id number]] (Abnormal)  Resulted: 04/06/17 2311, Result status: Final result    Ordering provider: SHANE Grissom MD  04/06/17 2308 Resulting lab: POINT OF CARE TEST, GLUCOSE    Specimen Information    Type Source Collected On     04/06/17 2308          Components       Value Reference Range Flag Lab   Glucose 146 70 - 99 mg/dL H 170            Platelet count [[id number]]  Resulted: 04/06/17 2100, Result status: Final result    Ordering provider: Derek Navas MD  04/06/17 2015 Resulting lab: [facility]    Specimen Information    Type Source Collected On   Blood  04/06/17 2047          Components       Value Reference Range Flag Lab   Platelet Count 180 150 - 450 10e9/L  51            Nt probnp inpatient [[id number]] (Abnormal)  Resulted: 04/06/17 0744, Result status: Final result    Ordering provider: Luis Felipe Aguillon MD  04/06/17 0537 Resulting lab: [facility]    Specimen Information    Type Source Collected On     04/06/17 0537          Components       Value Reference Range Flag Lab   N-Terminal Pro BNP Inpatient 43050 0 - 900 pg/mL H 51   Comment:         Reference range shown and results flagged as abnormal are suggested inpatient   cut points for confirming diagnosis if CHF in an acute setting. Establishing   a   baseline value for each individual patient is useful for follow-up. An   inpatient or emergency department NT-proPBNP <300 pg/mL effectively rules out   acute CHF, with 99% negative predictive value.  The outpatient non-acute reference range for ruling out CHF is:   0-125 pg/mL (age 18 to less than 75)   0-450 pg/mL (age 75  yrs and older)              Comprehensive metabolic panel [[id number]] (Abnormal)  Resulted: 04/06/17 0611, Result status: Final result    Ordering provider: Luis Felipe Aguillon MD  04/06/17 0536 Resulting lab: [facility]    Specimen Information    Type Source Collected On   Blood  04/06/17 0537          Components       Value Reference Range Flag Lab   Sodium 144 133 - 144 mmol/L  51   Potassium 3.6 3.4 - 5.3 mmol/L  51   Chloride 108 94 - 109 mmol/L  51   Carbon Dioxide 25 20 - 32 mmol/L  51   Anion Gap 10 3 - 14 mmol/L  51   Glucose 120 70 - 99 mg/dL H 51   Urea Nitrogen 10 7 - 30 mg/dL  51   Creatinine 0.63 0.66 - 1.25 mg/dL L 51   GFR Estimate -- >60 mL/min/1.7m2  51   Result:         >90  Non  GFR Calc     GFR Estimate If Black -- >60 mL/min/1.7m2  51   Result:         >90   GFR Calc     Calcium 8.0 8.5 - 10.1 mg/dL L 51   Result:     Bilirubin Total 0.2 0.2 - 1.3 mg/dL  51   Albumin 2.4 3.4 - 5.0 g/dL L 51   Protein Total 5.9 6.8 - 8.8 g/dL L 51   Alkaline Phosphatase 154 40 - 150 U/L H 51   ALT 17 0 - 70 U/L  51   AST 10 0 - 45 U/L  51            Influenza A/B antigen [[id number]]  Resulted: 04/06/17 0608, Result status: Final result    Ordering provider: Luis Felipe Aguillon MD  04/06/17 0536 Resulting lab: [facility]    Specimen Information    Type Source Collected On   Nasopharyngeal  04/06/17 0538          Components       Value Reference Range Flag Lab   Influenza A/B Agn Specimen Nasopharyngeal   51   Influenza A Negative NEG  51   Influenza B -- NEG  51   Result:         Negative   Test results must be correlated with clinical data. If necessary, results   should be confirmed by a molecular assay or viral culture.              CBC with platelets differential [[id number]] (Abnormal)  Resulted: 04/06/17 0550, Result status: Final result    Ordering provider: Luis Felipe Aguillon MD  04/06/17 0536 Resulting lab:  [facility]    Specimen Information    Type Source Collected On   Blood  04/06/17 0537          Components       Value Reference Range Flag Lab   WBC 6.1 4.0 - 11.0 10e9/L  51   RBC Count 3.71 4.4 - 5.9 10e12/L L 51   Hemoglobin 9.8 13.3 - 17.7 g/dL L 51   Hematocrit 33.6 40.0 - 53.0 % L 51   MCV 91 78 - 100 fl  51   MCH 26.4 26.5 - 33.0 pg L 51   MCHC 29.2 31.5 - 36.5 g/dL L 51   RDW 18.0 10.0 - 15.0 % H 51   Platelet Count 171 150 - 450 10e9/L  51   Diff Method Automated Method   51   % Neutrophils 81.1 %  51   % Lymphocytes 12.4 %  51   % Monocytes 5.6 %  51   % Eosinophils 0.5 %  51   % Basophils 0.2 %  51   % Immature Granulocytes 0.2 %  51   Nucleated RBCs 0 0 /100  51   Absolute Neutrophil 4.9 1.6 - 8.3 10e9/L  51   Absolute Lymphocytes 0.8 0.8 - 5.3 10e9/L  51   Absolute Monocytes 0.3 0.0 - 1.3 10e9/L  51   Absolute Eosinophils 0.0 0.0 - 0.7 10e9/L  51   Absolute Basophils 0.0 0.0 - 0.2 10e9/L  51   Abs Immature Granulocytes 0.0 0 - 0.4 10e9/L  51   Absolute Nucleated RBC 0.0   51            Lactic acid whole blood [[id number]]  Resulted: 04/06/17 0546, Result status: In process    Ordering provider: Daniel Meyer MD  04/06/17 0541 Resulting lab: [facility]    Specimen Information    Type Source Collected On     04/06/17 0541            Testing Performed By     Lab - Abbreviation Name Director Address Valid Date Range    45 - KRI645 MISYS Unknown Unknown 01/28/02 0000 - Present    51 - Unknown [facility] Unknown [address] [facility] [id number] 12/31/14 1010 - Present    170 - Unknown POINT OF CARE TEST, GLUCOSE Unknown Unknown 10/31/11 1114 - Present               Imaging Results - 3 Days      Chest  XR, 1 view portable [[id number]]  Resulted: 04/06/17 0747, Result status: Final result    Ordering provider: Daniel Meyer MD  04/06/17 0619 Resulted by: Cl Robertson MD Luchsinger, Samuel, MD    Performed: 04/06/17 0634 -  04/06/17 0650 Resulting lab: RADIOLOGY RESULTS    Narrative:       EXAM: XR CHEST PORT 1 VW  4/6/2017 6:50 AM      HISTORY: short of breath    COMPARISON: 3/12/2017    FINDINGS: The tip of the right upper extremity PICC is poorly  visualized but reaches at least the mid SVC. Sternotomy wires and  posterior thoracic hardware fusion. Scattered mediastinal surgical  clips.    The cardiac silhouette is enlarged, stable. Moderate layering right  pleural effusion with overlying atelectasis versus consolidation,  similar to prior. Partially visualized small left pleural effusion.  Patchy left retrocardiac airspace opacity, also similar prior. No  definite pneumothorax. Worsening perihilar opacities.       Impression:       IMPRESSION:   1. Worsening perihilar opacities, likely pulmonary edema.  2. Stable moderate right and small left pleural effusions with  overlying atelectasis versus consolidation. Stable left retrocardiac  airspace opacity of atelectasis versus infection.    I have personally reviewed the examination and initial interpretation  and I agree with the findings.    ANA ALEXANDER MD      Testing Performed By     Lab - Abbreviation Name Director Address Valid Date Range    104 - Rad Rslts RADIOLOGY RESULTS Unknown Unknown 02/16/05 1553 - Present            Encounter-Level Documents:     There are no encounter-level documents.      Order-Level Documents:     There are no order-level documents.

## 2017-04-06 NOTE — LETTER
Transition Communication Hand-off for Care Transitions to Next Level of Care Provider    Name: Tad Manning  MRN #: [id number]  Primary Care Provider: Gene Guevara     Primary Clinic: [address] [address]     Reason for Hospitalization:  Acute on chronic systolic congestive heart failure (H) [I50.23]  Atrial flutter, unspecified type (H) [I48.92]  Dyspnea, unspecified type [R06.00]  Admit Date/Time: 4/6/2017  5:28 AM  Discharge Date: 4/6/17  Payor Source: Payor: MEDICARE / Plan: MEDICARE / Product Type: Medicare /          Reason for Communication Hand-off Referral: Other Discharge to TCU    Discharge Plan:  [facility]  PH: [phone]   F: [phone]     Concern for non-adherence with plan of care:   Y/N N  Discharge Needs Assessment:  Needs       Most Recent Value    Transportation Available car        Follow-up plan:  Future Appointments  Date Time Provider Department Center   4/13/2017 11:00 AM Gillian Max MD [facility]   4/17/2017 4:30 PM Marge Richards PA-C [facility]   5/1/2017 11:00 AM Clarisse Valdes MD [facility]       Any outstanding tests or procedures:        Referrals     Future Labs/Procedures    Occupational Therapy Adult Consult     Comments:    Evaluate and treat as clinically indicated.    Reason: Continue physical therapy from previous stay (from spinal abscess)    Physical Therapy Adult Consult     Comments:    Evaluate and treat as clinically indicated.    Reason: Continue physical therapy from previous stay (from spinal abscess)        Supplies     Future Labs/Procedures    AntiEmbolism Stockings     Comments:    Bilateral below knee length.On in the morning, off at night          Key Recommendations:  None    KATHY Galarza, APSW  [address]   Phone: [phone]  Pager: [phone]  Unit: [phone]

## 2017-04-06 NOTE — PHARMACY-ADMISSION MEDICATION HISTORY
Admission medication history interview status for the 4/6/2017 admission is complete. See Epic admission navigator for allergy information, pharmacy, prior to admission medications and immunization status.     Medication history interview sources: MAR from [facility], TCU nurse ([phone])    Changes made to PTA medication list (reason)  Added:   - Metronidazole (per MAR)  - Ceftriaxone (per MAR)  - Calazime (per nurse)    Deleted: none    Changed: none    Additional medication history information (including reliability of information, actions taken by pharmacist):  - Patient resides at [facility]. Meds are administered and documented by nursing staff.  - Per [facility], patient received an influenza vaccine on 11/29/16  - Per MAR, patient was started on ceftriaxone and metronidazole for bone/joint infection on 3/23/17 and is due to complete 3 weeks of treatment (ending 4/13/17)    Prior to Admission medications    Medication Sig Last Dose Taking? Auth Provider   CEFTRIAXONE SODIUM IV Inject 2 g into the vein 2 times daily 4/5/2017 at 2000 Yes Unknown, Entered By History   METRONIDAZOLE PO Take 500 mg by mouth 3 times daily  4/5/2017 at PM Yes Unknown, Entered By History   Skin Protectants, Misc. (CALAZIME SKIN PROTECTANT EX) Apply topically daily 4/5/2017 Yes Unknown, Entered By History   nystatin (MYCOSTATIN) 763722 UNIT/GM POWD  4/5/2017 Yes Reported, Patient   melatonin 3 MG tablet Take 1 tablet (3 mg) by mouth nightly as needed PRN Yes Boris Melendez MD   mirtazapine (REMERON) 15 MG tablet Take 1 tablet (15 mg) by mouth At Bedtime 4/5/2017 at HS Yes Boris Melendez MD   insulin glargine (LANTUS) 100 UNIT/ML injection Inject 22 Units Subcutaneous every morning (before breakfast) 4/5/2017 at AM Yes Boris Melendez MD   metFORMIN (GLUCOPHAGE) 500 MG tablet Take 1 tablet (500 mg) by mouth 2 times daily (with meals) 4/5/2017 at 1730 Yes  Boris Melendez MD   lisinopril (PRINIVIL/ZESTRIL) 10 MG tablet Take 1 tablet (10 mg) by mouth daily 4/5/2017 at AM Yes Boris Melendez MD   metoprolol (LOPRESSOR) 25 MG tablet Take 3 tablets (75 mg) by mouth 2 times daily 4/5/2017 at PM Yes Boris Melendez MD   furosemide (LASIX) 40 MG tablet Take 1 tablet (40 mg) by mouth daily 4/5/2017 at AM Yes Boris Melendez MD   potassium chloride SA (K-DUR/KLOR-CON M) 20 MEQ CR tablet Take 1 tablet (20 mEq) by mouth daily 4/5/2017 at AM Yes Boris Melendez MD   clopidogrel (PLAVIX) 75 MG tablet Take 1 tablet (75 mg) by mouth daily 4/5/2017 at AM Yes Boris Melendez MD   ranitidine (ZANTAC) 150 MG tablet Take 1 tablet (150 mg) by mouth 2 times daily 4/5/2017 at PM Yes Boris Melendez MD   acetaminophen (TYLENOL) 325 MG tablet Take 2 tablets (650 mg) by mouth every 4 hours as needed for other (surgical pain) PRN Yes Jeaneth Mesa MD   aspirin 325 MG tablet Take 1 tablet (325 mg) by mouth daily 4/5/2017 at AM Yes Jeaneth Mesa MD   polyethylene glycol (MIRALAX/GLYCOLAX) Packet Take 17 g by mouth daily as needed for constipation PRN Yes Patria Dee MD   senna-docusate (SENOKOT-S;PERICOLACE) 8.6-50 MG per tablet Take 1-2 tablets by mouth 2 times daily as needed (constipation ) PRN Yes Patria Dee MD   tamsulosin (FLOMAX) 0.4 MG 24 hr capsule Take 0.8 mg by mouth At Bedtime  4/5/2017 at HS Yes Reported, Patient   Cholecalciferol (VITAMIN D PO) Take 1,000 Units by mouth daily.  4/5/2017 at AM Yes Reported, Patient   atorvastatin (LIPITOR) 40 MG tablet Take 1 tablet by mouth daily. 4/5/2017 at HS Yes Fritz Dugan MD   Ascorbic Acid (VITAMIN C PO) Take 500 mg by mouth daily. 4/5/2017 at AM Yes Reported, Patient   Nitroglycerin (NITROQUICK SL) Place 0.4 mg under the tongue as needed. PRN Yes Reported, Patient   order for DME Equipment being ordered: 2  Enzo Ames, JESSICA     Medication history completed by: Ayla Escobar, PharmD IV student

## 2017-04-06 NOTE — ED NOTES
Pt signed out to me by Dr Meyer. CXR CHF, BNP 10K from 7K, EKG ? A flutter with 2:1 block recent Echo with EF 40-45%, now O2 sat low 90's with RR mid 20's, given Lasix 40 IV, D/W Dr Belcher-admit to Cards 1, also   D/W Cards 1 Resident.     Bartolo Aldana MD  04/06/17 0901

## 2017-04-06 NOTE — ED NOTES
Patient BIBA c/o SOB and cough for a few days. Patient is staying at a TCU currently after back surgery. Influenza B is going around at the TCU per EMS. Oxygen saturation 95% on RA, 100% at 2 LPM. Denies SOB at this time. Heart rate 125.

## 2017-04-06 NOTE — IP AVS SNAPSHOT
[address] [address]    Phone:  [phone]                                       After Visit Summary   4/6/2017    Tad Manning    MRN: [id number]           After Visit Summary Signature Page     I have received my discharge instructions, and my questions have been answered. I have discussed any challenges I see with this plan with the nurse or doctor.    ..........................................................................................................................................  Patient/Patient Representative Signature      ..........................................................................................................................................  Patient Representative Print Name and Relationship to Patient    ..................................................               ................................................  Date                                            Time    ..........................................................................................................................................  Reviewed by Signature/Title    ...................................................              ..............................................  Date                                                            Time

## 2017-04-06 NOTE — ED PROVIDER NOTES
History     Chief Complaint   Patient presents with     Shortness of Breath     HPI  Tad Manning is a 76 year old male who presents with dyspnea. Approximately 2 months ago he was treated for an epidural abscess. He had surgery for this with decompression and fusion. He is now in a transitional care center. He has been relatively immobile for several weeks. Today he noted onset of dyspnea. No chest pain. Bilateral leg swelling. Also dry cough and recent exposure to influenza at transitional care. No fever or chills.   I have reviewed the Medications, Allergies, Past Medical and Surgical History, and Social History in the [employer] system.  Past Medical History:   Diagnosis Date     Arthritis      ASCVD (arteriosclerotic cardiovascular disease)      BMI 30.0-30.9,adult      BPH (benign prostatic hypertrophy)      Cellulitis      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)      Coronary artery disease     triple bypass 1995     Diabetes mellitus (H)      Diabetic polyneuropathy (H)      History of blood transfusion      Hyperlipidaemia      Hypertension      Malignant neoplasm (H)     CLL     Noninflammatory pericardial effusion      Osteomyelitis of left foot (H)      PVD (peripheral vascular disease) (H)      Sebaceous cyst        Review of Systems   Constitutional: Positive for activity change. Negative for chills, diaphoresis and fever.   HENT: Negative for congestion, rhinorrhea and sore throat.    Respiratory: Positive for cough and shortness of breath. Negative for chest tightness.    Cardiovascular: Positive for leg swelling. Negative for chest pain and palpitations.   Gastrointestinal: Negative for abdominal pain, diarrhea, nausea and vomiting.   Musculoskeletal: Positive for gait problem. Negative for neck pain.   Skin: Negative for rash.   Neurological: Positive for weakness. Negative for dizziness, syncope, light-headedness and headaches.   Hematological: Does not  bruise/bleed easily.   Psychiatric/Behavioral: Negative for confusion.       Physical Exam   BP: (!) 139/96  Heart Rate: 125  Temp: 97.6  F (36.4  C)  Resp: 28  Height: 182.9 cm (6')  Weight: 89.8 kg (198 lb)  SpO2: 95 %  Physical Exam   Constitutional: He appears well-developed and well-nourished. No distress.   HENT:   Head: Normocephalic and atraumatic.   Mouth/Throat: Oropharynx is clear and moist.   Eyes: Conjunctivae and EOM are normal.   Neck: Normal range of motion. Neck supple.   Cardiovascular: Normal rate, regular rhythm, normal heart sounds and intact distal pulses.    Pulmonary/Chest: Effort normal. No respiratory distress. He has rales.   Abdominal: Soft. There is no tenderness.   Musculoskeletal: He exhibits edema. He exhibits no tenderness.   Neurological: He is alert.   Skin: Skin is warm and dry.   Psychiatric: He has a normal mood and affect.   Nursing note and vitals reviewed.      ED Course     ED Course     Procedures             EKG Interpretation:      Interpreted by Daniel Crump  Time reviewed: 0600  Symptoms at time of EKG: short of breath   Rhythm: sinus tachycardia  Rate: 120-130  Axis: Left Axis Deviation  Ectopy: premature atrial contraction  Conduction: normal  ST Segments/ T Waves: Non-specific ST-T wave changes  Q Waves: none  Comparison to prior: Unchanged    Clinical Impression: sinus tachycardia, premature atrial contractions, nonspecific ST-T abnormality                Critical Care time:  none               Labs Ordered and Resulted from Time of ED Arrival Up to the Time of Departure from the ED   COMPREHENSIVE METABOLIC PANEL - Abnormal; Notable for the following:        Result Value    Glucose 120 (*)     Creatinine 0.63 (*)     Calcium 8.0 (*)     Albumin 2.4 (*)     Protein Total 5.9 (*)     Alkaline Phosphatase 154 (*)     All other components within normal limits   CBC WITH PLATELETS DIFFERENTIAL - Abnormal; Notable for the following:     RBC Count 3.71 (*)      Hemoglobin 9.8 (*)     Hematocrit 33.6 (*)     MCH 26.4 (*)     MCHC 29.2 (*)     RDW 18.0 (*)     All other components within normal limits   NT PROBNP INPATIENT - Abnormal; Notable for the following:     N-Terminal Pro BNP Inpatient 93485 (*)     All other components within normal limits   ISTAT  GASES LACTATE THEODORA POCT - Abnormal; Notable for the following:     Lactic Acid 0.6 (*)     All other components within normal limits   PULSE OXIMETRY NURSING   CARDIAC CONTINUOUS MONITORING   ISTAT CG4 GASES LACTATE THEODORA NURSING POCT   STRICT INTAKE AND OUTPUT   INFLUENZA A/B ANTIGEN       Assessments & Plan (with Medical Decision Making)   Dyspnea. Differential diagnosis includes congestive heart failure, pulmonary embolism, pneumonia, etc. Initial studies ordered and will sign out to oncoming ED staff.    I have reviewed the nursing notes.    I have reviewed the findings, diagnosis, plan and need for follow up with the patient.    New Prescriptions    No medications on file       Final diagnoses:   Dyspnea, unspecified type       4/6/2017   [facility], [place], EMERGENCY DEPARTMENT     Daniel Meyer MD  04/06/17 0751

## 2017-04-06 NOTE — LETTER
Health Information Management Services               Recipient:  [facility]  PH: [phone]   F: [phone]          Sender:  KATHY Galarza, MARICARMENW  [address]   Phone: [phone]  Pager: [phone]  Unit: [phone]          Date: April 7, 2017  Patient Name:  Tad Manning  Routing Message:  Updated notes.  Pt is asking to return for TCU placement.  Not sure when pt will be ready for discharge.  I will have weekend SW follow up with you all.  Please let me know if he cannot return for tcu.  Lastly, pt is requesting larger room if able for his return.  Thank you, Brandi          The documents accompanying this notice contain confidential information belonging to the sender.  This information is intended only for the use of the individual or entity named above.  The authorized recipient of this information is prohibited from disclosing this information to any other party and is required to destroy the information after its stated need has been fulfilled, unless otherwise required by state law.      If you are not the intended recipient, you are hereby notified that any disclosure, copying, distribution or action taken in reliance on the contents of these documents is strictly prohibited. If you have received this document in error, please notify [place] immediately at [phone].  You may return the document via fax ([phone]) or return mail  (Health Information Management, , [address] [place] [address]).

## 2017-04-06 NOTE — IP AVS SNAPSHOT
MRN:[id number]                      After Visit Summary   4/6/2017    Tad Manning    MRN: [id number]           Thank you!     Thank you for choosing [facility] for your care. Our goal is always to provide you with excellent care. Hearing back from our patients is one way we can continue to improve our services. Please take a few minutes to complete the written survey that you may receive in the mail after you visit with us. Thank you!        Patient Information     Date Of Birth          1940        Designated Caregiver       Most Recent Value    Caregiver    Will someone help with your care after discharge? yes    Name of designated caregiver Patrice Aguillon    Phone number of caregiver     Caregiver address [id number] [place] [address]      About your hospital stay     You were admitted on:  April 6, 2017 You last received care in the:  [address] [facility]    You were discharged on:  April 8, 2017        Reason for your hospital stay       Dear Mr. Manning,      You were hospitalized at [facility] with respiratory distress due to fluid overload and treated with diuretics. Over your hospitalization your breathing substantially improved and today you are ready to be discharged to your rehabilitation facility. If you continue diuretic therapy you should continue to improve but if you develop fever, shortness of breath, light headedness or chest pain, please seek medical attention.    We are suggesting the following medications:  Furosemide 40mg BID    Please get the following tests done:  Continue daily weights and monitor your intake/output    You already have appointments with:  Dr. Max in Infectious Disease (4/13/17)  Dr. Richards in Neurosurgery (4/17/17)  Dr. Valdes in Cardiology (5/1/17)    Please bring up any concerns with shortness of breath or fluid retention at these appointments.     It was a pleasure meeting with you.. Thank  you for allowing me and my team the privilege of caring for you today. Please let us know if there is anything else we can do for you so that we can be sure you are leaving completely satisfied with your care experience.    Your hospital unit at the time of discharge is 6C so if you have any questions please call the hospital at [phone] and ask to talk to a nurse.      Take care!    Derek Navas DDS  Oral & Maxillofacial Surgery  PGY-1                  Who to Call     For medical emergencies, please call 911.  For non-urgent questions about your medical care, please call your primary care provider or clinic, [phone]          Attending Provider     Provider Specialty    Daniel Meyer MD Emergency Medicine    Bartolo Aldana MD Internal Medicine    Bo Carroll MD Emergency Medicine    SHANE Grissom MD Cardiology       Primary Care Provider Office Phone # Fax #    Gene Guevara [phone]       [place] [address]        After Care Instructions     Activity - Up with nursing assistance           Advance Diet as Tolerated       Follow this diet upon discharge:       Fluid restriction 1500 ML FLUID      Moderate Consistent CHO Diet            Daily weights       Call provider for weight gain of more than 2 pounds per day or 5 pounds per week.            General info for SNF       Length of Stay Estimate: short term care  Condition at Discharge: improving  Level of care: skilled   Rehabilitation Potential: good  Admission H&P remains valid and up-to-date: yes  Recent Chemotherapy: no  Use Nursing Home Standing Orders: {standing orders:053075                  Follow-up Appointments     Follow Up ([facility])       Dr. Max in Infectious Disease (4/13/17)  Dr. Richards in Neurosurgery (4/17/17)  Dr. Valdes in Cardiology (5/1/17)    Appointments on [facility] and/or [facility] (with [facility] or [facility] provider or service). Call  [phone] if you have any questions.                  Your next 10 appointments already scheduled     Apr 13, 2017 11:00 AM CDT   (Arrive by 10:45 AM)   Return Visit with Gillian Max MD   [facility] and Infectious Diseases ([facility])    [address] [place] [id number]   [phone]            Apr 17, 2017  4:30 PM CDT   (Arrive by 4:15 PM)   Return Visit with Marge Richards PA-C   [facility] ([facility])    [address] [place] [id number]   [phone]            May 01, 2017 11:00 AM CDT   (Arrive by 10:45 AM)   Return Visit with Clarisse Valdes MD   [facility] ([facility])    [address] [place] [id number]   [phone]              Additional Services     Occupational Therapy Adult Consult       Evaluate and treat as clinically indicated.    Reason: Continue physical therapy from previous stay (from spinal abscess)            Physical Therapy Adult Consult       Evaluate and treat as clinically indicated.    Reason: Continue physical therapy from previous stay (from spinal abscess)                  Future tests that were ordered for you     AntiEmbolism Stockings       Bilateral below knee length.On in the morning, off at night                  General Recommendations To Control Heart Failure When You Get Home     Instructions To Patients and Families: Please read and check off each of these important instructions as you do them when you get home.           Weight and symptoms      ___ Put a scale in your bathroom  ___ Post a weight chart or calendar next to the scale  ___Weigh yourself every day as soon as you you get up in the morning. You should only be wearing your pajamas. Write your weight on the chart/calendar.  ___ Bring your weight chart/calendar with you to all appointments    ___Call your doctor if  "you gain 2 pounds in 1 day or 5 pounds in 1 week from your \"dry\" weight (baseline weight). Also call your doctor if you have shortness of breath that gets worse over time, leg swelling or fatigue.         Medicines and diet     ___ Make sure to take your medicines as prescribed.    ___Bring a current list of your medicines and all of your medicine bottles with you to all appointments.    ___ Limit fluids if you still have swelling or shortness of breath, or if your doctor tells you to do so.  ___ Eat less than 2000 mg of sodium (salt) every day. Read food labels, and do not add salt to meals.   ___ Heart healthy diet with low fat and low cholesterol          Activity and suggested lifestyle changes    ___ Stay active. Talk to your doctor about an exercise program that is safe for your heart.    ___ Stop smoking. Reduce alcohol use.      ___ Lose weight if you are overweight. Extra weight puts a lot of stress on the heart.          Control for Leg Swelling   ___ Keep your legs elevated (raised) as needed for swelling. If swelling is uncomfortable or elevation doesn t help, ask your doctor about using ACE wrap or Jobst stockings.          Follow-up appointments   ___ Make a [facility] appointment with a basic metabolic panel lab draw 3 to 5 days after you leave the hospital. Call one of the following locations:   [facility]  [phone],  [facility] [place] [phone],  [facility]  [phone].     ___ Make sure to take your medications as prescribed and bring an accurate list of your medications and your weight chart/calendar to your follow up appointment at the [facility] Clinic for continued education and adjustments          What is the CORE clinic?    The C.O.R.E (Cardiomyopathy, Optimization, Rehabilitation, Education) Clinic is a heart failure specialty clinic within the [facility]. At " "C.O.R.E., you will work with nurse practitioners to carefully adjust medicines, get education and learn who and when to call if symptoms appear. [facility].O.R.E nurses specialize in helping you:    better understand your disease.    slow the progress of your disease.    improve the length and quality of your life.    detect future heart problems before they become life threatening.    avoid hospital stays.            Pending Results     No orders found from 2017 to 2017.            Statement of Approval     Ordered          17 1105  I have reviewed and agree with all the recommendations and orders detailed in this document.  EFFECTIVE NOW     Approved and electronically signed by:  Derek Navas MD             Admission Information     Date & Time Department Dept. Phone    2017 [address] [facility] [phone]      Your Vitals Were     Blood Pressure Pulse Temperature Respirations Height Weight    103/70 (BP Location: Left arm) 80 97.7  F (36.5  C) (Oral) 18 1.829 m (6') 88.1 kg (194 lb 3.6 oz)    Pulse Oximetry BMI (Body Mass Index)                92% 26.34 kg/m2          MyChart Information     [employer] lets you send messages to your doctor, view your test results, renew your prescriptions, schedule appointments and more. To sign up, go to [email or web address] . Click on \"Log in\" on the left side of the screen, which will take you to the Welcome page. Then click on \"Sign up Now\" on the right side of the page.     You will be asked to enter the access code listed below, as well as some personal information. Please follow the directions to create your username and password.     Your access code is: [email or web address]  Expires: 2017  5:04 PM     Your access code will  in 90 days. If you need help or a new code, please call your [facility] or [phone].        Care EveryWhere ID     This is your Care EveryWhere ID. This could be used by other organizations to access your [facility] medical " records  [id number]           Review of your medicines      START taking        Dose / Directions    benzonatate 100 MG capsule   Commonly known as:  TESSALON   Used for:  Acute on chronic systolic congestive heart failure (H)        Dose:  100 mg   Take 1 capsule (100 mg) by mouth 3 times daily as needed for cough   Quantity:  42 capsule   Refills:  0       cefTRIAXone 2 GM vial   Commonly known as:  ROCEPHIN   Indication:  Infection of Bone and Bone Marrow   Used for:  Epidural abscess   Replaces:  CEFTRIAXONE SODIUM IV        Dose:  2 g   Inject 2 g into the vein every 12 hours   Quantity:  14 each   Refills:  0       oseltamivir 75 MG capsule   Commonly known as:  TAMIFLU   Indication:  Treatment to Prevent Influenza   Used for:  Acute respiratory failure with hypoxia (H)        Dose:  75 mg   Take 1 capsule (75 mg) by mouth daily   Quantity:  30 capsule   Refills:  0         CONTINUE these medicines which may have CHANGED, or have new prescriptions. If we are uncertain of the size of tablets/capsules you have at home, strength may be listed as something that might have changed.        Dose / Directions    CALAZIME SKIN PROTECTANT Pste   This may have changed:  how much to take   Used for:  Epidural abscess        Dose:  1 Application   Apply 1 Application topically daily   Quantity:  1 Tube   Refills:  0       cholecalciferol 1000 UNITS Tabs   This may have changed:  medication strength   Used for:  Loop diuretic causing adverse effect in therapeutic use, subsequent encounter        Dose:  1000 Units   Take 1,000 Units by mouth daily   Quantity:  30 tablet   Refills:  0       furosemide 40 MG tablet   Commonly known as:  LASIX   This may have changed:  when to take this   Used for:  Acute on chronic systolic congestive heart failure (H)        Dose:  40 mg   Take 1 tablet (40 mg) by mouth 2 times daily   Quantity:  30 tablet   Refills:  0       metroNIDAZOLE 500 MG tablet   Commonly known as:  FLAGYL    Indication:  Infection of Bone and Bone Marrow   This may have changed:    - medication strength  - when to take this   Used for:  Epidural abscess        Dose:  500 mg   Take 1 tablet (500 mg) by mouth every 8 hours   Quantity:  20 tablet   Refills:  0       nitroglycerin 0.4 MG sublingual tablet   Commonly known as:  NITROSTAT   This may have changed:    - medication strength  - how much to take  - how to take this  - when to take this  - reasons to take this  - additional instructions   Used for:  Atrial flutter, unspecified type (H)        For chest pain place 1 tablet under the tongue every 5 minutes for 3 doses. If symptoms persist 5 minutes after 1st dose call 911.   Quantity:  25 tablet   Refills:  0       Vitamin C 500 MG Caps   This may have changed:  medication strength   Used for:  Type 2 diabetes mellitus with diabetic polyneuropathy, without long-term current use of insulin (H)        Dose:  500 mg   Take 500 mg by mouth daily   Quantity:  30 capsule   Refills:  0         CONTINUE these medicines which have NOT CHANGED        Dose / Directions    acetaminophen 325 MG tablet   Commonly known as:  TYLENOL   Used for:  Acute midline low back pain, with sciatica presence unspecified        Dose:  650 mg   Take 2 tablets (650 mg) by mouth every 4 hours as needed for other (surgical pain)   Quantity:  100 tablet   Refills:  0       aspirin 325 MG tablet   Used for:  Acute midline low back pain, with sciatica presence unspecified        Dose:  325 mg   Take 1 tablet (325 mg) by mouth daily   Quantity:  120 tablet   Refills:  0       atorvastatin 40 MG tablet   Commonly known as:  LIPITOR   Used for:  Claudication of left lower extremity (H)        Dose:  40 mg   Take 1 tablet (40 mg) by mouth daily   Quantity:  30 tablet   Refills:  1       clopidogrel 75 MG tablet   Commonly known as:  PLAVIX   Used for:  Atrial flutter, unspecified type (H)        Dose:  75 mg   Take 1 tablet (75 mg) by mouth daily    Quantity:  30 tablet   Refills:  0       insulin glargine 100 UNIT/ML injection   Commonly known as:  LANTUS   Used for:  Epidural abscess        Dose:  22 Units   Inject 22 Units Subcutaneous every morning (before breakfast)   Quantity:  30 mL   Refills:  0       lisinopril 10 MG tablet   Commonly known as:  PRINIVIL/ZESTRIL   Used for:  Essential hypertension        Dose:  10 mg   Take 1 tablet (10 mg) by mouth daily   Quantity:  30 tablet   Refills:  0       melatonin 3 MG tablet   Used for:  Primary insomnia        Dose:  3 mg   Take 1 tablet (3 mg) by mouth nightly as needed   Refills:  0       metFORMIN 500 MG tablet   Commonly known as:  GLUCOPHAGE   Used for:  Type 2 diabetes mellitus with diabetic polyneuropathy, without long-term current use of insulin (H)        Dose:  500 mg   Take 1 tablet (500 mg) by mouth 2 times daily (with meals)   Quantity:  60 tablet   Refills:  0       metoprolol 25 MG tablet   Commonly known as:  LOPRESSOR   Used for:  Atrial flutter, unspecified type (H)        Dose:  75 mg   Take 3 tablets (75 mg) by mouth 2 times daily   Quantity:  60 tablet   Refills:  0       mirtazapine 15 MG tablet   Commonly known as:  REMERON   Used for:  Primary insomnia        Dose:  15 mg   Take 1 tablet (15 mg) by mouth At Bedtime   Quantity:  60 tablet   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), Diabetic ulcer of left foot due to type 2 diabetes mellitus (H)        Equipment being ordered: DH2 shoe   Quantity:  1 Device   Refills:  0       polyethylene glycol Packet   Commonly known as:  MIRALAX/GLYCOLAX   Used for:  Constipation, unspecified constipation type        Dose:  17 g   Take 17 g by mouth daily as needed for constipation   Quantity:  7 packet   Refills:  0       potassium chloride SA 20 MEQ CR tablet   Commonly known as:  K-DUR/KLOR-CON M   Used for:  Loop diuretic causing adverse effect in therapeutic use, subsequent encounter         Dose:  20 mEq   Take 1 tablet (20 mEq) by mouth daily   Quantity:  90 tablet   Refills:  0       ranitidine 150 MG tablet   Commonly known as:  ZANTAC   Used for:  Gastroesophageal reflux disease without esophagitis        Dose:  150 mg   Take 1 tablet (150 mg) by mouth 2 times daily   Quantity:  60 tablet   Refills:  0       senna-docusate 8.6-50 MG per tablet   Commonly known as:  SENOKOT-S;PERICOLACE   Used for:  Constipation, unspecified constipation type        Dose:  1-2 tablet   Take 1-2 tablets by mouth 2 times daily as needed (constipation )   Quantity:  100 tablet   Refills:  0       tamsulosin 0.4 MG capsule   Commonly known as:  FLOMAX   Used for:  Renal insufficiency        Dose:  0.8 mg   Take 2 capsules (0.8 mg) by mouth At Bedtime   Quantity:  60 capsule   Refills:  0         STOP taking     CEFTRIAXONE SODIUM IV   Replaced by:  cefTRIAXone 2 GM vial           nystatin 198739 UNIT/GM Powd   Commonly known as:  MYCOSTATIN                Where to get your medicines      Some of these will need a paper prescription and others can be bought over the counter. Ask your nurse if you have questions.     You don't need a prescription for these medications     acetaminophen 325 MG tablet    aspirin 325 MG tablet    atorvastatin 40 MG tablet    benzonatate 100 MG capsule    CALAZIME SKIN PROTECTANT Pste    cefTRIAXone 2 GM vial    cholecalciferol 1000 UNITS Tabs    clopidogrel 75 MG tablet    furosemide 40 MG tablet    insulin glargine 100 UNIT/ML injection    lisinopril 10 MG tablet    melatonin 3 MG tablet    metFORMIN 500 MG tablet    metoprolol 25 MG tablet    metroNIDAZOLE 500 MG tablet    mirtazapine 15 MG tablet    nitroglycerin 0.4 MG sublingual tablet    oseltamivir 75 MG capsule    polyethylene glycol Packet    potassium chloride SA 20 MEQ CR tablet    ranitidine 150 MG tablet    senna-docusate 8.6-50 MG per tablet    tamsulosin 0.4 MG capsule    Vitamin C 500 MG Caps                Protect  others around you: Learn how to safely use, store and throw away your medicines at www.disposemymeds.org.             Medication List: This is a list of all your medications and when to take them. Check marks below indicate your daily home schedule. Keep this list as a reference.      Medications           Morning Afternoon Evening Bedtime As Needed    acetaminophen 325 MG tablet   Commonly known as:  TYLENOL   Take 2 tablets (650 mg) by mouth every 4 hours as needed for other (surgical pain)                                aspirin 325 MG tablet   Take 1 tablet (325 mg) by mouth daily   Last time this was given:  325 mg on 4/8/2017  8:37 AM                                atorvastatin 40 MG tablet   Commonly known as:  LIPITOR   Take 1 tablet (40 mg) by mouth daily   Last time this was given:  40 mg on 4/7/2017  9:13 PM                                benzonatate 100 MG capsule   Commonly known as:  TESSALON   Take 1 capsule (100 mg) by mouth 3 times daily as needed for cough                                CALAZIME SKIN PROTECTANT Pste   Apply 1 Application topically daily                                cefTRIAXone 2 GM vial   Commonly known as:  ROCEPHIN   Inject 2 g into the vein every 12 hours   Last time this was given:  2 g on 4/8/2017  6:12 AM                                cholecalciferol 1000 UNITS Tabs   Take 1,000 Units by mouth daily   Last time this was given:  1,000 Units on 4/8/2017  8:37 AM                                clopidogrel 75 MG tablet   Commonly known as:  PLAVIX   Take 1 tablet (75 mg) by mouth daily   Last time this was given:  75 mg on 4/8/2017  8:36 AM                                furosemide 40 MG tablet   Commonly known as:  LASIX   Take 1 tablet (40 mg) by mouth 2 times daily   Last time this was given:  40 mg on 4/8/2017  8:37 AM                                insulin glargine 100 UNIT/ML injection   Commonly known as:  LANTUS   Inject 22 Units Subcutaneous every morning (before  breakfast)   Last time this was given:  22 Units on 4/8/2017  8:44 AM                                lisinopril 10 MG tablet   Commonly known as:  PRINIVIL/ZESTRIL   Take 1 tablet (10 mg) by mouth daily   Last time this was given:  10 mg on 4/8/2017  8:37 AM                                melatonin 3 MG tablet   Take 1 tablet (3 mg) by mouth nightly as needed                                metFORMIN 500 MG tablet   Commonly known as:  GLUCOPHAGE   Take 1 tablet (500 mg) by mouth 2 times daily (with meals)   Last time this was given:  500 mg on 4/8/2017  8:38 AM                                metoprolol 25 MG tablet   Commonly known as:  LOPRESSOR   Take 3 tablets (75 mg) by mouth 2 times daily   Last time this was given:  75 mg on 4/8/2017  8:37 AM                                metroNIDAZOLE 500 MG tablet   Commonly known as:  FLAGYL   Take 1 tablet (500 mg) by mouth every 8 hours   Last time this was given:  500 mg on 4/8/2017  6:12 AM                                mirtazapine 15 MG tablet   Commonly known as:  REMERON   Take 1 tablet (15 mg) by mouth At Bedtime   Last time this was given:  15 mg on 4/7/2017  9:14 PM                                nitroglycerin 0.4 MG sublingual tablet   Commonly known as:  NITROSTAT   For chest pain place 1 tablet under the tongue every 5 minutes for 3 doses. If symptoms persist 5 minutes after 1st dose call 911.                                order for DME   Equipment being ordered: DH2 shoe                                oseltamivir 75 MG capsule   Commonly known as:  TAMIFLU   Take 1 capsule (75 mg) by mouth daily   Last time this was given:  75 mg on 4/8/2017 11:22 AM                                polyethylene glycol Packet   Commonly known as:  MIRALAX/GLYCOLAX   Take 17 g by mouth daily as needed for constipation                                potassium chloride SA 20 MEQ CR tablet   Commonly known as:  K-DUR/KLOR-CON M   Take 1 tablet (20 mEq) by mouth daily   Last time  this was given:  20 mEq on 4/8/2017  8:38 AM                                ranitidine 150 MG tablet   Commonly known as:  ZANTAC   Take 1 tablet (150 mg) by mouth 2 times daily   Last time this was given:  150 mg on 4/8/2017  8:37 AM                                senna-docusate 8.6-50 MG per tablet   Commonly known as:  SENOKOT-S;PERICOLACE   Take 1-2 tablets by mouth 2 times daily as needed (constipation )                                tamsulosin 0.4 MG capsule   Commonly known as:  FLOMAX   Take 2 capsules (0.8 mg) by mouth At Bedtime   Last time this was given:  0.8 mg on 4/7/2017  9:33 PM                                Vitamin C 500 MG Caps   Take 500 mg by mouth daily   Last time this was given:  500 mg on 4/8/2017  8:37 AM

## 2017-04-07 ENCOUNTER — APPOINTMENT (OUTPATIENT)
Dept: OCCUPATIONAL THERAPY | Facility: CLINIC | Age: 77
DRG: 291 | End: 2017-04-07
Payer: MEDICARE

## 2017-04-07 LAB
ANION GAP SERPL CALCULATED.3IONS-SCNC: 10 MMOL/L (ref 3–14)
BUN SERPL-MCNC: 9 MG/DL (ref 7–30)
CALCIUM SERPL-MCNC: 8.4 MG/DL (ref 8.5–10.1)
CHLORIDE SERPL-SCNC: 103 MMOL/L (ref 94–109)
CO2 SERPL-SCNC: 28 MMOL/L (ref 20–32)
CREAT SERPL-MCNC: 0.64 MG/DL (ref 0.66–1.25)
CRP SERPL-MCNC: 17 MG/L (ref 0–8)
ERYTHROCYTE [DISTWIDTH] IN BLOOD BY AUTOMATED COUNT: 18.1 % (ref 10–15)
ERYTHROCYTE [SEDIMENTATION RATE] IN BLOOD BY WESTERGREN METHOD: 16 MM/H (ref 0–20)
GFR SERPL CREATININE-BSD FRML MDRD: ABNORMAL ML/MIN/1.7M2
GLUCOSE BLDC GLUCOMTR-MCNC: 105 MG/DL (ref 70–99)
GLUCOSE BLDC GLUCOMTR-MCNC: 130 MG/DL (ref 70–99)
GLUCOSE BLDC GLUCOMTR-MCNC: 135 MG/DL (ref 70–99)
GLUCOSE BLDC GLUCOMTR-MCNC: 138 MG/DL (ref 70–99)
GLUCOSE SERPL-MCNC: 131 MG/DL (ref 70–99)
HCT VFR BLD AUTO: 33.7 % (ref 40–53)
HGB BLD-MCNC: 9.7 G/DL (ref 13.3–17.7)
MCH RBC QN AUTO: 26 PG (ref 26.5–33)
MCHC RBC AUTO-ENTMCNC: 28.8 G/DL (ref 31.5–36.5)
MCV RBC AUTO: 90 FL (ref 78–100)
PLATELET # BLD AUTO: 180 10E9/L (ref 150–450)
POTASSIUM SERPL-SCNC: 3.6 MMOL/L (ref 3.4–5.3)
RBC # BLD AUTO: 3.73 10E12/L (ref 4.4–5.9)
SODIUM SERPL-SCNC: 141 MMOL/L (ref 133–144)
WBC # BLD AUTO: 4.7 10E9/L (ref 4–11)

## 2017-04-07 PROCEDURE — 25000131 ZZH RX MED GY IP 250 OP 636 PS 637: Mod: GY | Performed by: STUDENT IN AN ORGANIZED HEALTH CARE EDUCATION/TRAINING PROGRAM

## 2017-04-07 PROCEDURE — A9270 NON-COVERED ITEM OR SERVICE: HCPCS | Mod: GY | Performed by: STUDENT IN AN ORGANIZED HEALTH CARE EDUCATION/TRAINING PROGRAM

## 2017-04-07 PROCEDURE — 40000133 ZZH STATISTIC OT WARD VISIT

## 2017-04-07 PROCEDURE — 00000146 ZZHCL STATISTIC GLUCOSE BY METER IP

## 2017-04-07 PROCEDURE — 97110 THERAPEUTIC EXERCISES: CPT | Mod: GO

## 2017-04-07 PROCEDURE — 21400006 ZZH R&B CCU INTERMEDIATE UMMC

## 2017-04-07 PROCEDURE — 99232 SBSQ HOSP IP/OBS MODERATE 35: CPT | Mod: GC | Performed by: INTERNAL MEDICINE

## 2017-04-07 PROCEDURE — 86140 C-REACTIVE PROTEIN: CPT | Performed by: STUDENT IN AN ORGANIZED HEALTH CARE EDUCATION/TRAINING PROGRAM

## 2017-04-07 PROCEDURE — 36592 COLLECT BLOOD FROM PICC: CPT | Performed by: STUDENT IN AN ORGANIZED HEALTH CARE EDUCATION/TRAINING PROGRAM

## 2017-04-07 PROCEDURE — 25000132 ZZH RX MED GY IP 250 OP 250 PS 637: Mod: GY | Performed by: STUDENT IN AN ORGANIZED HEALTH CARE EDUCATION/TRAINING PROGRAM

## 2017-04-07 PROCEDURE — 40000802 ZZH SITE CHECK

## 2017-04-07 PROCEDURE — 97165 OT EVAL LOW COMPLEX 30 MIN: CPT | Mod: GO

## 2017-04-07 PROCEDURE — 85027 COMPLETE CBC AUTOMATED: CPT | Performed by: STUDENT IN AN ORGANIZED HEALTH CARE EDUCATION/TRAINING PROGRAM

## 2017-04-07 PROCEDURE — 25000128 H RX IP 250 OP 636: Performed by: STUDENT IN AN ORGANIZED HEALTH CARE EDUCATION/TRAINING PROGRAM

## 2017-04-07 PROCEDURE — 80048 BASIC METABOLIC PNL TOTAL CA: CPT | Performed by: STUDENT IN AN ORGANIZED HEALTH CARE EDUCATION/TRAINING PROGRAM

## 2017-04-07 PROCEDURE — 85652 RBC SED RATE AUTOMATED: CPT | Performed by: STUDENT IN AN ORGANIZED HEALTH CARE EDUCATION/TRAINING PROGRAM

## 2017-04-07 PROCEDURE — 97535 SELF CARE MNGMENT TRAINING: CPT | Mod: GO

## 2017-04-07 RX ORDER — FUROSEMIDE 40 MG
40 TABLET ORAL
Status: DISCONTINUED | OUTPATIENT
Start: 2017-04-08 | End: 2017-04-08 | Stop reason: HOSPADM

## 2017-04-07 RX ORDER — BENZONATATE 100 MG/1
100 CAPSULE ORAL 3 TIMES DAILY PRN
Status: DISCONTINUED | OUTPATIENT
Start: 2017-04-07 | End: 2017-04-08 | Stop reason: HOSPADM

## 2017-04-07 RX ADMIN — TAMSULOSIN HYDROCHLORIDE 0.8 MG: 0.4 CAPSULE ORAL at 21:33

## 2017-04-07 RX ADMIN — METFORMIN HYDROCHLORIDE 500 MG: 500 TABLET ORAL at 08:57

## 2017-04-07 RX ADMIN — CEFTRIAXONE 2 G: 2 INJECTION, POWDER, FOR SOLUTION INTRAMUSCULAR; INTRAVENOUS at 06:04

## 2017-04-07 RX ADMIN — VITAMIN D, TAB 1000IU (100/BT) 1000 UNITS: 25 TAB at 08:56

## 2017-04-07 RX ADMIN — FUROSEMIDE 40 MG: 10 INJECTION, SOLUTION INTRAVENOUS at 16:03

## 2017-04-07 RX ADMIN — METFORMIN HYDROCHLORIDE 500 MG: 500 TABLET ORAL at 17:16

## 2017-04-07 RX ADMIN — METOPROLOL TARTRATE 75 MG: 50 TABLET, FILM COATED ORAL at 21:06

## 2017-04-07 RX ADMIN — LISINOPRIL 10 MG: 10 TABLET ORAL at 08:59

## 2017-04-07 RX ADMIN — INSULIN GLARGINE 22 UNITS: 100 INJECTION, SOLUTION SUBCUTANEOUS at 09:01

## 2017-04-07 RX ADMIN — METRONIDAZOLE 500 MG: 500 TABLET ORAL at 06:05

## 2017-04-07 RX ADMIN — HEPARIN SODIUM 5000 UNITS: 5000 INJECTION, SOLUTION INTRAVENOUS; SUBCUTANEOUS at 08:55

## 2017-04-07 RX ADMIN — FUROSEMIDE 40 MG: 10 INJECTION, SOLUTION INTRAVENOUS at 08:55

## 2017-04-07 RX ADMIN — CLOPIDOGREL 75 MG: 75 TABLET, FILM COATED ORAL at 08:57

## 2017-04-07 RX ADMIN — ASPIRIN 325 MG ORAL TABLET 325 MG: 325 PILL ORAL at 08:55

## 2017-04-07 RX ADMIN — METRONIDAZOLE 500 MG: 500 TABLET ORAL at 14:29

## 2017-04-07 RX ADMIN — POTASSIUM CHLORIDE 20 MEQ: 750 TABLET, EXTENDED RELEASE ORAL at 08:55

## 2017-04-07 RX ADMIN — RANITIDINE HYDROCHLORIDE 150 MG: 150 TABLET, FILM COATED ORAL at 08:57

## 2017-04-07 RX ADMIN — ATORVASTATIN CALCIUM 40 MG: 40 TABLET, FILM COATED ORAL at 21:13

## 2017-04-07 RX ADMIN — HEPARIN SODIUM 5000 UNITS: 5000 INJECTION, SOLUTION INTRAVENOUS; SUBCUTANEOUS at 21:06

## 2017-04-07 RX ADMIN — CEFTRIAXONE 2 G: 2 INJECTION, POWDER, FOR SOLUTION INTRAMUSCULAR; INTRAVENOUS at 17:16

## 2017-04-07 RX ADMIN — METOPROLOL TARTRATE 75 MG: 50 TABLET, FILM COATED ORAL at 08:56

## 2017-04-07 RX ADMIN — METRONIDAZOLE 500 MG: 500 TABLET ORAL at 21:15

## 2017-04-07 RX ADMIN — RANITIDINE HYDROCHLORIDE 150 MG: 150 TABLET, FILM COATED ORAL at 21:06

## 2017-04-07 RX ADMIN — MIRTAZAPINE 15 MG: 15 TABLET, FILM COATED ORAL at 21:14

## 2017-04-07 RX ADMIN — OXYCODONE HYDROCHLORIDE AND ACETAMINOPHEN 500 MG: 500 TABLET ORAL at 08:56

## 2017-04-07 ASSESSMENT — ACTIVITIES OF DAILY LIVING (ADL): PREVIOUS_RESPONSIBILITIES: MEAL PREP;HOUSEKEEPING;LAUNDRY;SHOPPING;MEDICATION MANAGEMENT;FINANCES;DRIVING;WORK

## 2017-04-07 NOTE — PLAN OF CARE
Problem: Goal Outcome Summary  Goal: Goal Outcome Summary  Outcome: Therapy, progress toward functional goals is gradual  OT 6C: Pt therapy kaila and willing to participate in upper body strengthening to progress self-care and functional mobility. Rec. return to [facility] upon discharge.

## 2017-04-07 NOTE — PLAN OF CARE
"Problem: Individualization  Goal: Patient Preferences  Outcome: Improving  D:Awake and alert.  Denies chest pain.  Denies shortness of breath.   Lungs are clear.   Diminished in the lower lobes.   Infrequent,  Nonproductive cough.  O2 sat 95% on room air.  Has no edema.   Rhythm is Aflutter 120,  Temp 97.3,  Bp 128/87  MAP (101).    Reports having \"slept well\"  During the night.       A:Is physically comfortable.   Breathing easy and maintaining normal oxygenation on room air.  Rhythm is unchanged.   Afebrile.  Vital signs are stable.  Condition is stable.     P:Continue to assess level of comfort.   Assess respiratory status and monitor O2 sats.  Monitor rhythm, temp and vital signs.  Notify MD with any acute changes in condition or rhythm.      "

## 2017-04-07 NOTE — PROGRESS NOTES
Pt admitted to unit from ED at 2000. Pt belongings in room by pt. Oriented to room and unit, connected to telemtry box. Admission profile, care plan, and education completed. R PICC double lumen SL'ed. No further concerns at this time. Continue to monitor and notify physician of any pertinent changes.

## 2017-04-07 NOTE — CONSULTS
Social Work: Assessment with Discharge Plan    Patient Name:  Peter Manning  :  1940  Age:  76 year old  MRN:  [id number]  Risk/Complexity Score:  Filed Complexity Screen Score: 11  Completed assessment with:  Pt, SNF admissions    Presenting Information   Reason for Referral:  Discharge plan  Date of Intake:  2017  Referral Source:  Chart Review and RNCC  Decision Maker:  Pt when able  Alternate Decision Maker:  In absence of HCD, NOK listed on Facesheet is brother Seferino  [facility] Care Directive:  Not on file  Living Situation:  House  Previous Functional Status:  Independent prior to spinal surgery  Patient and family understanding of hospitalization:  Pt states his goal is to return to [facility] then discharge to home.  Cultural/Language/Spiritual Considerations:  None reported.  Adjustment to Illness:  Pt is in a positive mood today.    Physical Health  Reason for Admission:    1. Dyspnea, unspecified type    2. Acute on chronic systolic congestive heart failure (H)    3. Atrial flutter, unspecified type (H)      Services Needed/Recommended:  TCU    Mental Health/Chemical Dependency  Diagnosis:  NA  Support/Services in Place:  NA  Services Needed/Recommended: NA    Support System  Significant relationship at present time:  Pt states family and friends involved.  Family of origin is available for support:  Yes  Other support available:  Yes  Gaps in support system:  Pt states he is not ready to go home and would like more therapy.  Patient is caregiver to:  None     Provider Information   Primary Care Physician:  Gene Guevara   [phone]   Clinic:  [address] / [facility]*      :  JASMYN    Financial   Income Source:  Retired  Financial Concerns:  None reported.  Provided education on Medicare SNF coverage.  Insurance:    Payor/Plan Subscriber Name Rel Member # Group #   MEDICARE - MEDICARE PETER MANNING*  [id number]        ATTNICO SINGH, [address]   BCBS - BCBS OF PETER DILLON*  [id number]      [address] [address]       Discharge Plan   Patient and family discharge goal:  TCU  Provided education on discharge plan:  YES  Patient agreeable to discharge plan:  YES  A list of Medicare Certified Facilities was provided to the patient and/or family to encourage patient choice. Patient's choices for facility are:    Return to [facility]  PH: [phone]   F: [phone]    Will NH provide Skilled rehabilitation or complex medical:  YES  General information regarding anticipated insurance coverage and possible out of pocket cost was discussed. Patient and patient's family are aware patient may incur the cost of transportation to the facility, pending insurance payment: YES  Barriers to discharge:  Medical stability    Discharge Recommendations   Anticipated Disposition:  Facility:  TCU - return to [facility]  Transportation Needs:  Medical:  Wheelchair  Name of Transportation Company and Phone:  [facility] as needed.    Additional comments   SW met with pt to discuss assessment and discharge planning.  [facility] for about 12 days.  Pt had questions/concerns regarding his Medicare coverage.  Pt states he has not been home since his admission to the hospital in February.  Pt states that he would like to complete his rehab at [facility] if able.  Pt states he would like to look into a private room versus a double room on return.  SW stated they would add this request to the referral.  Pt in agreement.  Unclear when discharge is planned.  SW will hand off to weekend SW to follow as needed.    KATHY Galarza, APSW  [address] Unit   Phone: [phone]  Pager: [phone]  Unit: [phone]

## 2017-04-07 NOTE — PLAN OF CARE
Problem: Goal Outcome Summary  Goal: Goal Outcome Summary  Pt has history of CAD s/p three vessel CABG in 1995, cardiomyopathy with LVEF 25-30%, DM-II, CLL in remission, HTN and PVD s/p iliac artery stents and left lower extremity bypass admitted for SOB.   VS and BS stable throughout shift. HR A-fib/flutter 80 - 130's. R double lumen PICC SL'ed. AO x 4 with A x 1-2 with lift for transfers r/t back surgery. Blanchable red skin noted on bottom, applied barrier cream to protect skin. Denies pain. Good UOP. Plan is to continue to treat growths cultured from abscess in spine with abx and manage fluid overload. Continue to monitor and notify physician of any pertinent changes.

## 2017-04-07 NOTE — PROGRESS NOTES
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   Self-Care   Dominant Hand right   Usual Activity Tolerance good   Current Activity Tolerance fair   Regular Exercise no   Functional Level Prior   Ambulation 3-->assistive equipment and person   Transferring 3-->assistive equipment and person   Toileting 3-->assistive equipment and person   Bathing 3-->assistive equipment and person   Dressing 2-->assistive person   Eating 0-->independent   Communication 0-->understands/communicates without difficulty   Swallowing 0-->swallows foods/liquids without difficulty   Cognition 0 - no cognition issues reported   General Information   Onset of Illness/Injury or Date of Surgery - Date (dates back to February. back surgery)   Referring Physician Derek Navas MD   Patient/Family Goals Statement return to rehab as soon as possible   Additional Occupational Profile Info/Pertinent History of Current Problem Pt has extensive history, has been hospitalized or in rehab since early February. currently admitted from TCU with respiratory issues. Has had recent back surgery . PMHx: CAD, s/p CABG in 95, LVEF 25-30%. DMII, CLL in remission, HTN, PVD with LLE bypass.    Precautions/Limitations fall precautions   Weight-Bearing Status - LUE full weight-bearing   Weight-Bearing Status - RUE full weight-bearing   Weight-Bearing Status - LLE full weight-bearing   Weight-Bearing Status - RLE full weight-bearing   General Observations No restriction to wt. bearing, however unable to bear much wt. due to back injury and weakness   Cognitive Status Examination   Orientation orientation to person, place and time   Level of  Consciousness alert   Able to Follow Commands WNL/WFL   Personal Safety (Cognitive) WNL/WFL   Memory intact   Attention No deficits were identified   Organization/Problem Solving No deficits were identified   Visual Perception   Visual Perception Wears glasses   Pain Assessment   Patient Currently in Pain No  (some pain with movement)   Integumentary/Edema   Integumentary/Edema other (describe)   Integumentary/Edema Comments some pain with movement   Posture   Posture Comments erect wihen seated EOB   Range of Motion (ROM)   ROM Comment Bilat UE WFL   Strength   Strength Comments Bilat UE 4+/5   Hand Strength   Hand Strength Comments Bilat  WFL   Muscle Tone Assessment   Muscle Tone Quick Adds No deficits were identified   Coordination   Coordination Comments Bilat FM - WFL   Mobility   Bed Mobility Comments independent to EOB   Grooming   Level of [place]: Grooming independent   Physical Assist/Nonphysical Assist: Grooming set-up required   Eating/Self Feeding   Level of [place]: Eating independent   Physical Assist/Nonphysical Assist: Eating set-up required   Instrumental Activities of Daily Living (IADL)   Previous Responsibilities meal prep;housekeeping;laundry;shopping;medication management;finances;driving;work   Activities of Daily Living Analysis   Impairments Contributing to Impaired Activities of Daily Living strength decreased;pain   General Therapy Interventions   Planned Therapy Interventions strengthening;ADL retraining   Clinical Impression   Criteria for Skilled Therapeutic Interventions Met yes, treatment indicated   OT Diagnosis s/p back surgery, respiratory failure   Influenced by the following impairments decreased strength and pain with movement   Assessment of Occupational Performance 1-3 Performance Deficits   Identified Performance Deficits decreased mobility, self-care   Clinical Decision Making (Complexity) Low complexity   Therapy Frequency 5 times/wk   Predicted Duration of  "Therapy Intervention (days/wks) 1 week   Anticipated Discharge Disposition Transitional Care Facility   Risks and Benefits of Treatment have been explained. Yes   Patient, Family & other staff in agreement with plan of care Yes   Clinical Impression Comments Pt has had extensive therapy, agreeable to strengthening and self-care activity while hospitalized   [facility] TM \"6 Clicks\"   2016, Trustees of [facility], under license to [employer].  All rights reserved.   6 Clicks Short Forms Daily Activity Inpatient Short Form   [facility]  \"6 Clicks\" Daily Activity Inpatient Short Form   1. Putting on and taking off regular lower body clothing? 2 - A Lot   2. Bathing (including washing, rinsing, drying)? 2 - A Lot   3. Toileting, which includes using toilet, bedpan or urinal? 3 - A Little   4. Putting on and taking off regular upper body clothing? 3 - A Little   5. Taking care of personal grooming such as brushing teeth? 3 - A Little   6. Eating meals? 4 - None   Daily Activity Raw Score (Score out of 24.Lower scores equate to lower levels of function) 17   Total Evaluation Time   Total Evaluation Time (Minutes) 6     "

## 2017-04-07 NOTE — PROGRESS NOTES
Cardiology Progress Note  04/07/2017    Tad Manning MRN# [id number]   Age: 76 year old  Sex: male YOB: 1940  Date of Admission: 4/6/2017     No changes today EXCEPT:  - Furosemide IV 40mg BID today, transition to 40mg BID PO tomorrow  - Substantially improved CRP from previous ID follow-up  - Labs for tomorrow: BMP, Mag, CBC       Assessment and Plan:   Assessment:  Tad Manning is a 77 y/o male with history of CAD s/p three vessel CABG in 1995, cardiomyopathy with LVEF 25-30%, DM-II, CLL in remission, HTN and PVD s/p iliac artery stents and left lower extremity bypass who presents with shortness of breath     Plan:  # Acute respiratory failure with hypoxia   # Acute on chronic systolic heart failure  CXR revealed pulmonary edema and bilateral pleural effusions with  overlying atelectasis.   - Furosemide IV 40mg BID today, transition to oral 40mg BID tomorrow  - Daily weights  - Strict I/O  - Monitor and replete lytes  - Supplemental oxygen ordered     # Atrial flutter with variable block  CHADSVASC score of 4, long-term anticoagulation would be preferable. Patient was not started on warfarin during last admission due to fall risk and current concern for bleeding due to DAPT.   - Continue metoprolol 75mg for rate control.     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 CRP is 17.0 (4/7/17), down from 81.0 on 3/15/17,  - Continue Ceftriaxone 2g q12h BID and Metronidazole 500mg TID  - Trend CBC and CRP     # HTN  Stable on admission.  - Continue to trend BPs  - Continue home lisinopril     # DM-II  Well-controlled, with Hgb A1c @ 6.1 on 2/10/2017  - Continue Lantus 22U qAM  - Continue Metformin 500mg BID  - Hypoglycemic protocol ordered with SSI     # Peripheral vascular disease  S/p bilateral lower extremity stents  - Continue Plavix and aspirin     FEN:   - monitor and replete lytes  - cardiac diet, 1.5L fluid restriction  Prophylaxis:   -  DVT: subcutaneous heparin  - GI: none     Code Status: FULL      Disposition: Pending management of fluid overload, patient will discharge to TCU     Patient seen and discussed with Dr. Belcher, who agrees with above plan.     Derek Navas DDS  Oral & Maxillofacial Surgery  PGY-1        Interval History:   No acute events overnight. Mr. Manning reports much improved breathing and that he has not needed the supplemental oxygen. He does endorse a stubborn dry cough, but reports that the swelling associated with his ankles is much better . He denies dysphagia or fevers/chills.    Review of symptoms otherwise negative.          Medications:   Reviewed. Please see EPIC for details.           Physical Exam:   Vitals were reviewed  Blood pressure 119/71, pulse 81, temperature 98.1  F (36.7  C), temperature source Oral, resp. rate 18, height 1.829 m (6'), weight 84.1 kg (185 lb 8 oz), SpO2 94 %.    Physical Exam:   General: Resting comfortably in bed, NAD  HEENT: EOMI, oropharynx clear and moist  CV: RRR, normal S1/S2, no murmur or rubs appreciated   Resp: Clear to auscultation bilaterally, no wheezes, rhonchi  Abd: Soft, non-tender, normoactive bowel sounds, no masses appreciated  Extremities: warm and well perfused, palpable pulses, no pedal edema  Neuro: AOx2 (not good with dates), normal mood and affect         Data:   ROUTINE LABS (Last four results)  CMP  Recent Labs  Lab 04/07/17  0757 04/06/17  0537    144   POTASSIUM 3.6 3.6   CHLORIDE 103 108   CO2 28 25   ANIONGAP 10 10   * 120*   BUN 9 10   CR 0.64* 0.63*   GFRESTIMATED >90Non  GFR Calc >90Non  GFR Calc   GFRESTBLACK >90African American GFR Calc >90African American GFR Calc   JUSTINO 8.4* 8.0*   PROTTOTAL  --  5.9*   ALBUMIN  --  2.4*   BILITOTAL  --  0.2   ALKPHOS  --  154*   AST  --  10   ALT  --  17     CBC  Recent Labs  Lab 04/07/17  0757 04/06/17  2047 04/06/17  0537   WBC 4.7  --  6.1   RBC 3.73*  --  3.71*   HGB  9.7*  --  9.8*   HCT 33.7*  --  33.6*   MCV 90  --  91   MCH 26.0*  --  26.4*   MCHC 28.8*  --  29.2*   RDW 18.1*  --  18.0*    180 171     I&O's:   Intake/Output Summary (Last 24 hours) at 04/07/17 1551  Last data filed at 04/07/17 1300   Gross per 24 hour   Intake              980 ml   Output             1865 ml   Net             -885 ml

## 2017-04-08 ENCOUNTER — APPOINTMENT (OUTPATIENT)
Dept: OCCUPATIONAL THERAPY | Facility: CLINIC | Age: 77
DRG: 291 | End: 2017-04-08
Payer: MEDICARE

## 2017-04-08 VITALS
HEIGHT: 72 IN | WEIGHT: 194.22 LBS | DIASTOLIC BLOOD PRESSURE: 70 MMHG | SYSTOLIC BLOOD PRESSURE: 103 MMHG | OXYGEN SATURATION: 92 % | HEART RATE: 80 BPM | TEMPERATURE: 97.7 F | RESPIRATION RATE: 18 BRPM | BODY MASS INDEX: 26.31 KG/M2

## 2017-04-08 LAB
ANION GAP SERPL CALCULATED.3IONS-SCNC: 9 MMOL/L (ref 3–14)
BUN SERPL-MCNC: 9 MG/DL (ref 7–30)
CALCIUM SERPL-MCNC: 8.1 MG/DL (ref 8.5–10.1)
CHLORIDE SERPL-SCNC: 106 MMOL/L (ref 94–109)
CO2 SERPL-SCNC: 29 MMOL/L (ref 20–32)
CREAT SERPL-MCNC: 0.63 MG/DL (ref 0.66–1.25)
ERYTHROCYTE [DISTWIDTH] IN BLOOD BY AUTOMATED COUNT: 18.4 % (ref 10–15)
GFR SERPL CREATININE-BSD FRML MDRD: ABNORMAL ML/MIN/1.7M2
GLUCOSE BLDC GLUCOMTR-MCNC: 109 MG/DL (ref 70–99)
GLUCOSE BLDC GLUCOMTR-MCNC: 98 MG/DL (ref 70–99)
GLUCOSE SERPL-MCNC: 119 MG/DL (ref 70–99)
HCT VFR BLD AUTO: 34.1 % (ref 40–53)
HGB BLD-MCNC: 10 G/DL (ref 13.3–17.7)
MAGNESIUM SERPL-MCNC: 1.4 MG/DL (ref 1.6–2.3)
MAGNESIUM SERPL-MCNC: 1.4 MG/DL (ref 1.6–2.3)
MCH RBC QN AUTO: 26.5 PG (ref 26.5–33)
MCHC RBC AUTO-ENTMCNC: 29.3 G/DL (ref 31.5–36.5)
MCV RBC AUTO: 90 FL (ref 78–100)
PLATELET # BLD AUTO: 162 10E9/L (ref 150–450)
POTASSIUM SERPL-SCNC: 3.2 MMOL/L (ref 3.4–5.3)
POTASSIUM SERPL-SCNC: 3.5 MMOL/L (ref 3.4–5.3)
RBC # BLD AUTO: 3.78 10E12/L (ref 4.4–5.9)
SODIUM SERPL-SCNC: 144 MMOL/L (ref 133–144)
WBC # BLD AUTO: 6.2 10E9/L (ref 4–11)

## 2017-04-08 PROCEDURE — 40000133 ZZH STATISTIC OT WARD VISIT

## 2017-04-08 PROCEDURE — 00000146 ZZHCL STATISTIC GLUCOSE BY METER IP

## 2017-04-08 PROCEDURE — 99238 HOSP IP/OBS DSCHRG MGMT 30/<: CPT | Mod: GC | Performed by: INTERNAL MEDICINE

## 2017-04-08 PROCEDURE — A9270 NON-COVERED ITEM OR SERVICE: HCPCS | Mod: GY | Performed by: STUDENT IN AN ORGANIZED HEALTH CARE EDUCATION/TRAINING PROGRAM

## 2017-04-08 PROCEDURE — 97110 THERAPEUTIC EXERCISES: CPT | Mod: GO

## 2017-04-08 PROCEDURE — 85027 COMPLETE CBC AUTOMATED: CPT | Performed by: STUDENT IN AN ORGANIZED HEALTH CARE EDUCATION/TRAINING PROGRAM

## 2017-04-08 PROCEDURE — 84132 ASSAY OF SERUM POTASSIUM: CPT | Performed by: STUDENT IN AN ORGANIZED HEALTH CARE EDUCATION/TRAINING PROGRAM

## 2017-04-08 PROCEDURE — 36592 COLLECT BLOOD FROM PICC: CPT | Performed by: STUDENT IN AN ORGANIZED HEALTH CARE EDUCATION/TRAINING PROGRAM

## 2017-04-08 PROCEDURE — 25000132 ZZH RX MED GY IP 250 OP 250 PS 637: Mod: GY | Performed by: STUDENT IN AN ORGANIZED HEALTH CARE EDUCATION/TRAINING PROGRAM

## 2017-04-08 PROCEDURE — 80048 BASIC METABOLIC PNL TOTAL CA: CPT | Performed by: STUDENT IN AN ORGANIZED HEALTH CARE EDUCATION/TRAINING PROGRAM

## 2017-04-08 PROCEDURE — 83735 ASSAY OF MAGNESIUM: CPT | Performed by: STUDENT IN AN ORGANIZED HEALTH CARE EDUCATION/TRAINING PROGRAM

## 2017-04-08 PROCEDURE — 25000128 H RX IP 250 OP 636: Performed by: STUDENT IN AN ORGANIZED HEALTH CARE EDUCATION/TRAINING PROGRAM

## 2017-04-08 PROCEDURE — 40000802 ZZH SITE CHECK

## 2017-04-08 RX ORDER — ASPIRIN 325 MG
325 TABLET ORAL DAILY
Qty: 120 TABLET | Refills: 0 | DISCHARGE
Start: 2017-04-08 | End: 2017-10-30

## 2017-04-08 RX ORDER — CEFTRIAXONE 2 G/1
2 INJECTION, POWDER, FOR SOLUTION INTRAMUSCULAR; INTRAVENOUS EVERY 12 HOURS
Qty: 14 EACH | DISCHARGE
Start: 2017-04-08 | End: 2017-05-01

## 2017-04-08 RX ORDER — METOPROLOL TARTRATE 25 MG/1
75 TABLET, FILM COATED ORAL 2 TIMES DAILY
Qty: 60 TABLET | DISCHARGE
Start: 2017-04-08 | End: 2017-08-14 | Stop reason: ALTCHOICE

## 2017-04-08 RX ORDER — OSELTAMIVIR PHOSPHATE 75 MG/1
75 CAPSULE ORAL DAILY
Status: DISCONTINUED | OUTPATIENT
Start: 2017-04-08 | End: 2017-04-08 | Stop reason: HOSPADM

## 2017-04-08 RX ORDER — POLYETHYLENE GLYCOL 3350 17 G/17G
17 POWDER, FOR SOLUTION ORAL DAILY PRN
Qty: 7 PACKET | Refills: 0 | DISCHARGE
Start: 2017-04-08 | End: 2017-10-30

## 2017-04-08 RX ORDER — OSELTAMIVIR PHOSPHATE 75 MG/1
75 CAPSULE ORAL DAILY
Qty: 30 CAPSULE | DISCHARGE
Start: 2017-04-08 | End: 2017-05-01

## 2017-04-08 RX ORDER — MIRTAZAPINE 15 MG/1
15 TABLET, FILM COATED ORAL AT BEDTIME
Qty: 60 TABLET | DISCHARGE
Start: 2017-04-08 | End: 2017-05-25

## 2017-04-08 RX ORDER — AMOXICILLIN 250 MG
1-2 CAPSULE ORAL 2 TIMES DAILY PRN
Qty: 100 TABLET | Refills: 0 | DISCHARGE
Start: 2017-04-08 | End: 2017-10-30

## 2017-04-08 RX ORDER — MULTIVIT-MIN/IRON/FOLIC ACID/K 18-600-40
500 CAPSULE ORAL DAILY
Qty: 30 CAPSULE | DISCHARGE
Start: 2017-04-08 | End: 2017-05-01

## 2017-04-08 RX ORDER — BENZONATATE 100 MG/1
100 CAPSULE ORAL 3 TIMES DAILY PRN
Qty: 42 CAPSULE | DISCHARGE
Start: 2017-04-08 | End: 2018-02-09

## 2017-04-08 RX ORDER — CALAMINE, MENTHOL, WHITE PETROLATUM, ZINC OXIDE .5; .2; 69; 19.5 G/100G; G/100G; G/100G; G/100G
1 PASTE TOPICAL DAILY
Qty: 1 TUBE | DISCHARGE
Start: 2017-04-08 | End: 2017-05-25

## 2017-04-08 RX ORDER — METRONIDAZOLE 500 MG/1
500 TABLET ORAL EVERY 8 HOURS
Qty: 20 TABLET | Refills: 0 | DISCHARGE
Start: 2017-04-08 | End: 2017-05-01

## 2017-04-08 RX ORDER — ACETAMINOPHEN 325 MG/1
650 TABLET ORAL EVERY 4 HOURS PRN
Qty: 100 TABLET | Refills: 0 | DISCHARGE
Start: 2017-04-08 | End: 2017-07-06

## 2017-04-08 RX ORDER — CLOPIDOGREL BISULFATE 75 MG/1
75 TABLET ORAL DAILY
Qty: 30 TABLET | DISCHARGE
Start: 2017-04-08 | End: 2018-02-05

## 2017-04-08 RX ORDER — ATORVASTATIN CALCIUM 40 MG/1
40 TABLET, FILM COATED ORAL DAILY
Qty: 30 TABLET | Refills: 1 | DISCHARGE
Start: 2017-04-08 | End: 2018-04-09

## 2017-04-08 RX ORDER — LANOLIN ALCOHOL/MO/W.PET/CERES
3 CREAM (GRAM) TOPICAL
DISCHARGE
Start: 2017-04-08 | End: 2017-07-06

## 2017-04-08 RX ORDER — TAMSULOSIN HYDROCHLORIDE 0.4 MG/1
0.8 CAPSULE ORAL AT BEDTIME
Qty: 60 CAPSULE | Status: ON HOLD | DISCHARGE
Start: 2017-04-08 | End: 2018-09-24

## 2017-04-08 RX ORDER — POTASSIUM CHLORIDE 1500 MG/1
20 TABLET, EXTENDED RELEASE ORAL DAILY
Qty: 90 TABLET | DISCHARGE
Start: 2017-04-08 | End: 2017-06-08 | Stop reason: DRUGHIGH

## 2017-04-08 RX ORDER — FUROSEMIDE 40 MG
40 TABLET ORAL
Qty: 30 TABLET | DISCHARGE
Start: 2017-04-08 | End: 2017-05-01

## 2017-04-08 RX ORDER — NITROGLYCERIN 0.4 MG/1
TABLET SUBLINGUAL
Qty: 25 TABLET | DISCHARGE
Start: 2017-04-08

## 2017-04-08 RX ORDER — LISINOPRIL 10 MG/1
10 TABLET ORAL DAILY
Qty: 30 TABLET | DISCHARGE
Start: 2017-04-08 | End: 2017-07-06

## 2017-04-08 RX ADMIN — METFORMIN HYDROCHLORIDE 500 MG: 500 TABLET ORAL at 08:38

## 2017-04-08 RX ADMIN — LISINOPRIL 10 MG: 10 TABLET ORAL at 08:37

## 2017-04-08 RX ADMIN — METRONIDAZOLE 500 MG: 500 TABLET ORAL at 06:12

## 2017-04-08 RX ADMIN — POTASSIUM CHLORIDE 20 MEQ: 750 TABLET, EXTENDED RELEASE ORAL at 08:38

## 2017-04-08 RX ADMIN — VITAMIN D, TAB 1000IU (100/BT) 1000 UNITS: 25 TAB at 08:37

## 2017-04-08 RX ADMIN — METOPROLOL TARTRATE 75 MG: 50 TABLET, FILM COATED ORAL at 08:37

## 2017-04-08 RX ADMIN — RANITIDINE HYDROCHLORIDE 150 MG: 150 TABLET, FILM COATED ORAL at 08:37

## 2017-04-08 RX ADMIN — INSULIN GLARGINE 22 UNITS: 100 INJECTION, SOLUTION SUBCUTANEOUS at 08:44

## 2017-04-08 RX ADMIN — CEFTRIAXONE 2 G: 2 INJECTION, POWDER, FOR SOLUTION INTRAMUSCULAR; INTRAVENOUS at 06:12

## 2017-04-08 RX ADMIN — CLOPIDOGREL 75 MG: 75 TABLET, FILM COATED ORAL at 08:36

## 2017-04-08 RX ADMIN — HEPARIN SODIUM 5000 UNITS: 5000 INJECTION, SOLUTION INTRAVENOUS; SUBCUTANEOUS at 08:38

## 2017-04-08 RX ADMIN — OXYCODONE HYDROCHLORIDE AND ACETAMINOPHEN 500 MG: 500 TABLET ORAL at 08:37

## 2017-04-08 RX ADMIN — FUROSEMIDE 40 MG: 40 TABLET ORAL at 08:37

## 2017-04-08 RX ADMIN — ASPIRIN 325 MG ORAL TABLET 325 MG: 325 PILL ORAL at 08:37

## 2017-04-08 RX ADMIN — OSELTAMIVIR PHOSPHATE 75 MG: 75 CAPSULE ORAL at 11:22

## 2017-04-08 NOTE — PLAN OF CARE
Problem: Individualization  Goal: Patient Preferences  Outcome: No Change  D:Patient incontinent of a small amount of loosely formed stool.  Coccyx is reddened and patient has two small opening on left buttock.   Barrier cream applied.  Area is blanchable.  No complaints of pain in area.  Patient is able reposition self and was encouraged to do side to side lying every two hours to reduce increase in skin break down.     A:Small ulcers on left buttock.  Is cooperative with repositioning.     P:Turn q 2hrs and keep area clean and dry.

## 2017-04-08 NOTE — PLAN OF CARE
Problem: Goal Outcome Summary  Goal: Goal Outcome Summary  LINK: Pt with h/y of CAD, 3 V CABG , CM (EF 25-30%) DM 2 , HTN, CLL,  PVD iliac artery stents, T9-10 si here due to SOB. Pt alert and oriented X 4,  VSS, atrial flutter wih HR at 86, /72, O2 sat at 93%, denies any pain,nausea,palpitation. Pt on cardiac diet. FR 1500 cc.  Coccyx area red,blanchabple with 2 small skin tears (pea size), pt repositioned e/y 2 hours, perineal care provided,barrier cream applied.  WOC consult ordered.    Plan: Continue to monitor,notify MD as neede. Reposition e/y 2 hours.

## 2017-04-08 NOTE — PROGRESS NOTES
"D:Patient stated that he was admitted \"with right PICC\" and that it \"should be left in\".   Was picked up by [facility] and transported by wheel chair to [facility]\".  Has all personal belongings and cell phone.  Denied any concerns.  Questions were addressed by team and .      A:Is ready to discharge to [facility].    P;Discharge to facility patient was admitted from.  "

## 2017-04-08 NOTE — PROGRESS NOTES
Social Work Services Discharge Note      Patient Name:  Tad Manning     Anticipated Discharge Date:  Saturday 4/8/17    Discharge Disposition:   TCU:  [address]  [address]  Admissions (Liana--in the office until 12noon today): [phone]  Main: [phone]  TCU Fax: [phone]    Liana states that there are no private rooms available in the TCU; pt will have to return to his shared room.  Pt will be placed on a waiting list for a private room and Liana will e-mail this request to pt's TCU SW.  Liana states there is an extra $20/day fee when in a private room.     Following MD:  designated per facility.       Pre-Admission Screening (PAS) online form has been completed.  The Level of Care (LOC) is:  Determined  Confirmation Code is:  Not needed as pt came from this facility.       Additional Services/Equipment Arranged[facility] (time requested by patient as he prefers to eat lunch at the SNF).  Pt states that he will call his sister-in-law, Joyce, and update her to the d/c time.  Pt asks that writer call his brother, Seferino.  SW called Seferino and informed him of above.  Seferino states that his wife, Joyce, plans to arrive to the hospital around 12noon to deliver clothing to the pt so that he does not have to wear the hospital gown during transport.  SW attempted to delay the ride however the only other opening for a ride is at 1615.  TYSON called the charge RN, Sejal, who states that pt has informed his sister-in-law of the 12noon ride and she is no longer coming.  He will wear some clothes that he has with him at discharge.  Ride kept at 12noon.       Patient / Family response to discharge plan:  Pt and brother voice agreement to the plan though wish pt was going into a private room at the TCU.  Pt states he is agreeable to paying for the private room fee at the TCU.  SW also informed pt that the w/c van cost will be private  pay.  Pt is aware of this as he recently received a bill from [employer]  the last time he discharged from the hospital.  He voices agreement.     Persons notified of above discharge plan:  Patient; brother Seferino; Dr. Navas; Liana in Admissions at [facility]; Terra, bedside RN; Sejal, Charge RN    Staff Discharge Instructions:   faxed the discharge orders to the Admitting Nurse at the fax number above at 1125am.  Please print a packet and send with patient.     CTS Handoff completed:  NO.  Pt's PCP, Dr. Guevara, clinics at the [facility].    Medicare Notice of Rights provided to the patient/family:  NO    TIMUR Lockett  Weekend SW  [phone] pager

## 2017-04-08 NOTE — DISCHARGE SUMMARY
[facility] Discharge Summary    Tad Manning MRN# [id number]   Age: 76 year old YOB: 1940     Date of Admission:  4/6/2017  Date of Discharge::  4/8/2017  Admitting Physician:  SHANE Grissom MD  Discharge Physician:  SHANE Grissom MD       RECOMMENDATIONS UPON DISCHARGE  - Furosemide 40mg PO BID   - Oseltamivir 75mg daily [facility] (continued per the protocol)  - Continue ceftriaxone and metronidazole per ID until 4/13/17 follow-up  - CHADSVASC score of 4, long-term anticoagulation would be preferable. Patient was not started on warfarin during last admission due to fall risk and current concern for bleeding due to DAPT.          Admission Diagnoses:   Acute on chronic systolic congestive heart failure (H) [I50.23]  Atrial flutter, unspecified type (H) [I48.92]  Dyspnea, unspecified type [R06.00]         Discharge Diagnoses:      Diagnosis     Acute respiratory failure with hypoxia (H)         Atrial flutter (H)         Osteomyelitis of thoracic spine (H)         Epidural abscess         Discitis         Diabetes mellitus, type 2 (H)         HTN (hypertension)         CAD (coronary artery disease)         Hyperlipidemia with target LDL less than 100         Chronic systolic congestive heart failure         Critical lower limb ischemia , s/p L AK pop to BK pop bypass with rGSV on 1/10/2014             Procedures:   CXR (read per radiology)  IMPRESSION:   1. Worsening perihilar opacities, likely pulmonary edema.  2. Stable moderate right and small left pleural effusions with  overlying atelectasis versus consolidation. Stable left retrocardiac  airspace opacity of atelectasis versus infection.          Labs:   CMP  Recent Labs  Lab 04/08/17  0927 04/08/17  0014 04/07/17  0757 04/06/17  0537     --  141 144   POTASSIUM 3.5 3.2* 3.6 3.6   CHLORIDE 106  --  103 108   CO2 29  --  28 25   ANIONGAP 9  --  10 10   *  --  131* 120*   BUN  9  --  9 10   CR 0.63*  --  0.64* 0.63*   GFRESTIMATED >90Non  GFR Calc  --  >90Non  GFR Calc >90Non  GFR Calc   GFRESTBLACK >90African American GFR Calc  --  >90African American GFR Calc >90African American GFR Calc   JUSTINO 8.1*  --  8.4* 8.0*   MAG 1.4* 1.4*  --   --    PROTTOTAL  --   --   --  5.9*   ALBUMIN  --   --   --  2.4*   BILITOTAL  --   --   --  0.2   ALKPHOS  --   --   --  154*   AST  --   --   --  10   ALT  --   --   --  17     CBC  Recent Labs  Lab 04/08/17  0927 04/07/17  0757 04/06/17 2047 04/06/17  0537   WBC 6.2 4.7  --  6.1   RBC 3.78* 3.73*  --  3.71*   HGB 10.0* 9.7*  --  9.8*   HCT 34.1* 33.7*  --  33.6*   MCV 90 90  --  91   MCH 26.5 26.0*  --  26.4*   MCHC 29.3* 28.8*  --  29.2*   RDW 18.4* 18.1*  --  18.0*    180 180 171     CRP - 17.0          Discharge Medications:     START taking these medications    Details   benzonatate (TESSALON) 100 MG capsule Take 1 capsule (100 mg) by mouth 3 times daily as needed for cough  Qty: 42 capsule    Associated Diagnoses: Acute on chronic systolic congestive heart failure (H)      cefTRIAXone (ROCEPHIN) 2 GM vial Inject 2 g into the vein every 12 hours  Qty: 14 each    Associated Diagnoses: Epidural abscess      oseltamivir (TAMIFLU) 75 MG capsule Take 1 capsule (75 mg) by mouth daily  Qty: 30 capsule    Comments: Per TCU protocol  Associated Diagnoses: Acute respiratory failure with hypoxia (H)         CONTINUE these medications which have CHANGED    Details   acetaminophen (TYLENOL) 325 MG tablet Take 2 tablets (650 mg) by mouth every 4 hours as needed for other (surgical pain)  Qty: 100 tablet, Refills: 0    Associated Diagnoses: Acute midline low back pain, with sciatica presence unspecified      aspirin 325 MG tablet Take 1 tablet (325 mg) by mouth daily  Qty: 120 tablet, Refills: 0    Associated Diagnoses: Acute midline low back pain, with sciatica presence unspecified      atorvastatin  (LIPITOR) 40 MG tablet Take 1 tablet (40 mg) by mouth daily  Qty: 30 tablet, Refills: 1    Associated Diagnoses: Claudication of left lower extremity (H)      clopidogrel (PLAVIX) 75 MG tablet Take 1 tablet (75 mg) by mouth daily  Qty: 30 tablet    Associated Diagnoses: Atrial flutter, unspecified type (H)      insulin glargine (LANTUS) 100 UNIT/ML injection Inject 22 Units Subcutaneous every morning (before breakfast)  Qty: 30 mL, Refills: 0    Associated Diagnoses: Epidural abscess      lisinopril (PRINIVIL/ZESTRIL) 10 MG tablet Take 1 tablet (10 mg) by mouth daily  Qty: 30 tablet    Associated Diagnoses: Essential hypertension      melatonin 3 MG tablet Take 1 tablet (3 mg) by mouth nightly as needed    Associated Diagnoses: Primary insomnia      metFORMIN (GLUCOPHAGE) 500 MG tablet Take 1 tablet (500 mg) by mouth 2 times daily (with meals)  Qty: 60 tablet    Associated Diagnoses: Type 2 diabetes mellitus with diabetic polyneuropathy, without long-term current use of insulin (H)      metoprolol (LOPRESSOR) 25 MG tablet Take 3 tablets (75 mg) by mouth 2 times daily  Qty: 60 tablet    Associated Diagnoses: Atrial flutter, unspecified type (H)      mirtazapine (REMERON) 15 MG tablet Take 1 tablet (15 mg) by mouth At Bedtime  Qty: 60 tablet    Associated Diagnoses: Primary insomnia      polyethylene glycol (MIRALAX/GLYCOLAX) Packet Take 17 g by mouth daily as needed for constipation  Qty: 7 packet, Refills: 0    Associated Diagnoses: Constipation, unspecified constipation type      ranitidine (ZANTAC) 150 MG tablet Take 1 tablet (150 mg) by mouth 2 times daily  Qty: 60 tablet    Associated Diagnoses: Gastroesophageal reflux disease without esophagitis      potassium chloride SA (K-DUR/KLOR-CON M) 20 MEQ CR tablet Take 1 tablet (20 mEq) by mouth daily  Qty: 90 tablet    Associated Diagnoses: Loop diuretic causing adverse effect in therapeutic use, subsequent encounter      senna-docusate (SENOKOT-S;PERICOLACE) 8.6-50  MG per tablet Take 1-2 tablets by mouth 2 times daily as needed (constipation )  Qty: 100 tablet, Refills: 0    Associated Diagnoses: Constipation, unspecified constipation type      Ascorbic Acid (VITAMIN C) 500 MG CAPS Take 500 mg by mouth daily  Qty: 30 capsule    Associated Diagnoses: Type 2 diabetes mellitus with diabetic polyneuropathy, without long-term current use of insulin (H)      cholecalciferol 1000 UNITS TABS Take 1,000 Units by mouth daily  Qty: 30 tablet    Associated Diagnoses: Loop diuretic causing adverse effect in therapeutic use, subsequent encounter      furosemide (LASIX) 40 MG tablet Take 1 tablet (40 mg) by mouth 2 times daily  Qty: 30 tablet    Associated Diagnoses: Acute on chronic systolic congestive heart failure (H)      metroNIDAZOLE (FLAGYL) 500 MG tablet Take 1 tablet (500 mg) by mouth every 8 hours  Qty: 20 tablet, Refills: 0    Associated Diagnoses: Epidural abscess      Skin Protectants, Misc. (CALAZIME SKIN PROTECTANT) PSTE Apply 1 Application topically daily  Qty: 1 Tube    Associated Diagnoses: Epidural abscess      tamsulosin (FLOMAX) 0.4 MG capsule Take 2 capsules (0.8 mg) by mouth At Bedtime  Qty: 60 capsule    Associated Diagnoses: Renal insufficiency    Nitroglycerin (NITROQUICK SL) Comments:            STOP taking these medications       CEFTRIAXONE SODIUM IV Comments:   Reason for Stopping:         nystatin (MYCOSTATIN) 328363 UNIT/GM POWD Comments:   Reason for Stopping:                    Consultations:   No consultations were requested during this admission          Brief History of Illness:   Tad Manning is a 75 y/o male with history of CAD s/p three veseel CABG in 1995, cardiomyopathy with LVEF 25-30%, DM-II, CLL in remission, HTN and PVD s/p iliac artery stents and left lower extremity bypass who presents with shortness of breath.    Mr. Manning presented initially to [facility] 1/20/17 with lower extremity weakness and syncopal episodes felt to  be linked with orthostatic hypotension and acute renal failure. Discharged home on the 1/25/17, presented back 2/7/17 and he was identified with thoracic diskitis and paraspinal abscess with spinal cord impingement. He transferred to [facility] 2/9/17 for further workup and management. He underwent surgical decompression and fusion, procedure as above on 2/12/17. Before surgery, he had almost complete leg paralysis. Post-surgically, he started to have some functional movement return. He was started on broad-spectrum antibiotics. The cultures subsequently grew moderate Streptococcus mitis along with various anaerobic species. Treatment modalities were narrowed to IV ceftriaxone and a PICC line was placed. [facility]. The patient was continually followed by Infectious Disease who recommended that he continue ceftriaxone 2g q12h and metronidazole 500mg TID until 4/13/17.          Hospital Course:   # Acute respiratory failure with hypoxia   # Acute on chronic systolic heart failure  CXR revealed pulmonary edema and bilateral pleural effusions with  overlying atelectasis. Patient improved with IV diuresis and did not require supplemental oxygen after the first day.   - Furosemide IV 40mg BID during admission  - Original diuretic dose was furosemide 40mg daily, transitioned to furosemide 40mg BID on discharge    # Atrial flutter with variable block  CHADSVASC score of 4, long-term anticoagulation would be preferable. Patient was not started on warfarin during last admission due to fall risk and current concern for bleeding due to DAPT.   - Continue metoprolol 75mg for rate control.      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 CRP is 17.0 (4/7/17), down from 81.0 on 3/15/17  - Continued Ceftriaxone 2g q12h BID and Metronidazole 500mg TID during admission  - Patient to follow-up with ID      #  HTN  Stable on admission and throughout hospital course.  - Continued home lisinopril      # DM-II  Well-controlled, with Hgb A1c @ 6.1 on 2/10/2017  - Continued Lantus 22U qAM  - Continued Metformin 500mg BID      # Peripheral vascular disease  S/p bilateral lower extremity stents  - Continued Plavix and aspirin       Condition at Discharge:   /70 (BP Location: Left arm)  Pulse 80  Temp 97.7  F (36.5  C) (Oral)  Resp 18  Ht 1.829 m (6')  Wt 88.1 kg (194 lb 3.6 oz)  SpO2 92%  BMI 26.34 kg/m2    Physical Exam:   General: Resting comfortably in bed, NAD  HEENT: EOMI, oropharynx clear and moist  CV: RRR, normal S1/S2, no murmur or rubs appreciated   Resp: Clear to auscultation bilaterally (improved from admission), no wheezes, rhonchi  Abd: Soft, non-tender, normoactive bowel sounds, no masses appreciated  Extremities: warm and well perfused, palpable pulses, no pedal edema  Neuro: AOx3, normal mood and affect         Discharge Instructions and Follow-Up:   Discharge diet: Advance to a regular diet as tolerated  Discharge activity: Activity as tolerated with PT/OT recs      Discharge Procedure Orders  AntiEmbolism Stockings   Order Comments: Bilateral below knee length.On in the morning, off at night     General info for SNF   Order Comments: Length of Stay Estimate: short term care  Condition at Discharge: improving  Level of care: skilled   Rehabilitation Potential: good  Admission H&P remains valid and up-to-date: yes  Recent Chemotherapy: no  Use Nursing Home Standing Orders: {standing orders:[id number]     Daily weights   Order Comments: Call provider for weight gain of more than 2 pounds per day or 5 pounds per week.     Activity - Up with nursing assistance   Order Specific Question Answer Comments   Is discharge order? Yes      Reason for your hospital stay   Order Comments: Dear Efraín Nigel,      You were hospitalized at [facility] with respiratory distress due to fluid  overload and treated with diuretics. Over your hospitalization your breathing substantially improved and today you are ready to be discharged to your rehabilitation facility. If you continue diuretic therapy you should continue to improve but if you develop fever, shortness of breath, light headedness or chest pain, please seek medical attention.    We are suggesting the following medications:  Furosemide 40mg BID    Please get the following tests done:  Continue daily weights and monitor your intake/output    You already have appointments with:  Dr. Max in Infectious Disease (4/13/17)  Dr. Richards in Neurosurgery (4/17/17)  Dr. Valdes in Cardiology (5/1/17)    Please bring up any concerns with shortness of breath or fluid retention at these appointments.     It was a pleasure meeting with you.. Thank you for allowing me and my team the privilege of caring for you today. Please let us know if there is anything else we can do for you so that we can be sure you are leaving completely satisfied with your care experience.    Your hospital unit at the time of discharge is 6C so if you have any questions please call the hospital at [phone] and ask to talk to a nurse.      Take care!    Derek Navas DDS  Oral & Maxillofacial Surgery  PGY-1     Follow Up ([facility])   Order Comments: Dr. Max in Infectious Disease (4/13/17)  Dr. Richards in Neurosurgery (4/17/17)  Dr. Valdes in Cardiology (5/1/17)    Appointments on [facility] and/or [facility] (with [facility] or [facility] provider or service). Call [phone] if you have any questions.     Full Code     Physical Therapy Adult Consult   Order Comments: Evaluate and treat as clinically indicated.    Reason: Continue physical therapy from previous stay (from spinal abscess)     Occupational Therapy Adult Consult   Order Comments: Evaluate and treat as clinically indicated.    Reason: Continue physical therapy from previous stay (from spinal abscess)     Advance Diet as  Tolerated   Order Comments: Follow this diet upon discharge:      Fluid restriction 1500 ML FLUID     Moderate Consistent CHO Diet   Order Specific Question Answer Comments   Is discharge order? Yes               Discharge Disposition:   Discharged to [facility]      Patient was seen and discussed with Dr. Belcher    Please feel free to contact me with any questions    Derek Navas DDS  Oral & Maxillofacial Surgery  PGY-1

## 2017-04-08 NOTE — PLAN OF CARE
Problem: Goal Outcome Summary  Goal: Goal Outcome Summary  OT6C: Pt completed ADL tasks with nursing staff prior to OT.  Agreeable to UE strengthening ex seated in bedside chair.  Rest breaks needed due to limited endurance.     REC: return to [facility] upon discharge.

## 2017-04-08 NOTE — PLAN OF CARE
Problem: Individualization  Goal: Patient Preferences  Outcome: No Change  D:Patient reminded to change postion every two hours and has been cooperative.  Patient is able to resposition self.   After bathing with assist,  Coccyx area was cleaned and mepelex dressing were applied to open areas on left upper buttock (near bony prominence).   A second mepelex dressing was applied to supperficial skin break down on the right side as well.   Skin is reddened and blanchable.  No complaints of pain.  No chest pain or shortness of breath.  Up out of bed to chair with assist of two.  Is week and transfers poorly with two.  Up for about an hour.  Lungs are clear and mild edema in the lower extremities.  Rhythm is Aflutter with rate controlled. HR 97,  Bp  103/70 MAP 86.  Temp 97.7 O2 sat 92% on room air.       A:Is physically comfortable.  Tolerated sitting up in chair.  Breathing easy with adequate oxygenation on room air.  Rhythm is unchanged.  Afebrile and vital signs are stable.  Was given tamiflu as ordered prior to discharge.  Condition is adequate for discharge back to [facility].      P:Discharge in a wheel chair back to facility prior to admission.  [facility] providing transportation.

## 2017-04-13 ENCOUNTER — OFFICE VISIT (OUTPATIENT)
Dept: INFECTIOUS DISEASES | Facility: CLINIC | Age: 77
End: 2017-04-13
Attending: INTERNAL MEDICINE
Payer: MEDICARE

## 2017-04-13 ENCOUNTER — CARE COORDINATION (OUTPATIENT)
Dept: CARDIOLOGY | Facility: CLINIC | Age: 77
End: 2017-04-13

## 2017-04-13 VITALS
DIASTOLIC BLOOD PRESSURE: 65 MMHG | WEIGHT: 190 LBS | HEIGHT: 72 IN | SYSTOLIC BLOOD PRESSURE: 104 MMHG | HEART RATE: 96 BPM | BODY MASS INDEX: 25.73 KG/M2

## 2017-04-13 DIAGNOSIS — M46.24 OSTEOMYELITIS OF THORACIC SPINE (H): ICD-10-CM

## 2017-04-13 DIAGNOSIS — R79.82 ELEVATED C-REACTIVE PROTEIN (CRP): ICD-10-CM

## 2017-04-13 DIAGNOSIS — M46.24 OSTEOMYELITIS OF THORACIC SPINE (H): Primary | ICD-10-CM

## 2017-04-13 DIAGNOSIS — A49.1 STREPTOCOCCAL INFECTION: ICD-10-CM

## 2017-04-13 LAB
ANION GAP SERPL CALCULATED.3IONS-SCNC: 10 MMOL/L (ref 3–14)
BASOPHILS # BLD AUTO: 0 10E9/L (ref 0–0.2)
BASOPHILS NFR BLD AUTO: 0.4 %
BUN SERPL-MCNC: 11 MG/DL (ref 7–30)
CALCIUM SERPL-MCNC: 8.5 MG/DL (ref 8.5–10.1)
CHLORIDE SERPL-SCNC: 104 MMOL/L (ref 94–109)
CO2 SERPL-SCNC: 28 MMOL/L (ref 20–32)
CREAT SERPL-MCNC: 0.67 MG/DL (ref 0.66–1.25)
CRP SERPL-MCNC: 7.5 MG/L (ref 0–8)
DIFFERENTIAL METHOD BLD: ABNORMAL
EOSINOPHIL # BLD AUTO: 0.2 10E9/L (ref 0–0.7)
EOSINOPHIL NFR BLD AUTO: 2 %
ERYTHROCYTE [DISTWIDTH] IN BLOOD BY AUTOMATED COUNT: 18.3 % (ref 10–15)
GFR SERPL CREATININE-BSD FRML MDRD: ABNORMAL ML/MIN/1.7M2
GLUCOSE SERPL-MCNC: 113 MG/DL (ref 70–99)
HCT VFR BLD AUTO: 35 % (ref 40–53)
HGB BLD-MCNC: 10.3 G/DL (ref 13.3–17.7)
IMM GRANULOCYTES # BLD: 0 10E9/L (ref 0–0.4)
IMM GRANULOCYTES NFR BLD: 0.4 %
LYMPHOCYTES # BLD AUTO: 1.7 10E9/L (ref 0.8–5.3)
LYMPHOCYTES NFR BLD AUTO: 20.5 %
MCH RBC QN AUTO: 26.7 PG (ref 26.5–33)
MCHC RBC AUTO-ENTMCNC: 29.4 G/DL (ref 31.5–36.5)
MCV RBC AUTO: 91 FL (ref 78–100)
MONOCYTES # BLD AUTO: 0.7 10E9/L (ref 0–1.3)
MONOCYTES NFR BLD AUTO: 7.8 %
NEUTROPHILS # BLD AUTO: 5.8 10E9/L (ref 1.6–8.3)
NEUTROPHILS NFR BLD AUTO: 68.9 %
NRBC # BLD AUTO: 0 10*3/UL
NRBC BLD AUTO-RTO: 0 /100
PLATELET # BLD AUTO: 220 10E9/L (ref 150–450)
POTASSIUM SERPL-SCNC: 4 MMOL/L (ref 3.4–5.3)
RBC # BLD AUTO: 3.86 10E12/L (ref 4.4–5.9)
SODIUM SERPL-SCNC: 142 MMOL/L (ref 133–144)
WBC # BLD AUTO: 8.3 10E9/L (ref 4–11)

## 2017-04-13 PROCEDURE — 99212 OFFICE O/P EST SF 10 MIN: CPT | Mod: ZF

## 2017-04-13 PROCEDURE — 80048 BASIC METABOLIC PNL TOTAL CA: CPT | Performed by: INTERNAL MEDICINE

## 2017-04-13 PROCEDURE — 85025 COMPLETE CBC W/AUTO DIFF WBC: CPT | Performed by: INTERNAL MEDICINE

## 2017-04-13 PROCEDURE — 36592 COLLECT BLOOD FROM PICC: CPT | Performed by: INTERNAL MEDICINE

## 2017-04-13 PROCEDURE — 86140 C-REACTIVE PROTEIN: CPT | Performed by: INTERNAL MEDICINE

## 2017-04-13 ASSESSMENT — PAIN SCALES - GENERAL: PAINLEVEL: NO PAIN (0)

## 2017-04-13 NOTE — PROGRESS NOTES
Patient has clinic visit today 4/13 so no post DC follow up call is needed          Date: 4/13/2017 Status: Arrived   Time: 11:00 AM Length: 30     Visit Type: UMP RETURN [[id number]]   JORDYN: [id number]   Provider: Gillian Max MD Department:  INFECTIOUS DISEASE   Special Needs:   Comments:     Referral Number:   Referral Status:     Enc Form Number: [id number] CSN: [id number]   Notes: 3 week F/U per Dr. Max      Arrival Time: 10:37 AM     Made On:  Checked In:   3/23/2017 9:10 AM  4/13/2017 10:37 AM

## 2017-04-13 NOTE — MR AVS SNAPSHOT
After Visit Summary   4/13/2017    Tad Manning    MRN: [id number]           Patient Information     Date Of Birth          1940        Visit Information        Provider Department      4/13/2017 11:00 AM Gillian Max MD [facility] and Infectious Diseases        Today's Diagnoses     Osteomyelitis of thoracic spine (H)    -  1      Care Instructions    Mr. Manning is doing well and has had 9 weeks of ceftriaxone after surgery. He has had fluctuating CRP that does not appear to correlate with infection, and his most CRP was 17. We will finish his course of metronidazole for anaerobes that may or may not have been a contaminant. Then plan to stop ceftriaxone but continue amoxicillin x 3 additional months to cover streptococcus due to hardware placed early during infection.     Plan:   1. Recheck labs today here in clinic. No need for additional labs tomorrow. Please check CRP and CBC in ~1 week and fax to Dr. Max at [phone]. Then, no additional labs required from infectious diseases standpoint.   2. Continue metronidazole unchanged through 4/20/17  3. Stop ceftriaxone and pull PICC  4. Start amoxicillin 500 mg PO TID x 3 months  5. Return to clinic in 4-6 weeks    Gillian Max MD  Infectious Diseases  [phone]        Follow-ups after your visit        Your next 10 appointments already scheduled     Apr 13, 2017 11:45 AM CDT   Lab with  LAB   [facility] ([facility])    [address] [place] [address]  [facility] [id number] [phone]            Apr 17, 2017  4:30 PM CDT   (Arrive by 4:15 PM)   Return Visit with Marge Richards PA-C   [facility] ([facility])    [address] [place] [address]  [facility] [id number] [phone]            May 01, 2017 11:00 AM CDT   (Arrive by 10:45 AM)   Return Visit with Clarisse Valdes MD   [facility] ([facility])    [address] [address]   [phone]            May 25, 2017 10:30 AM CDT   (Arrive by 10:15 AM)   Return Visit with Gillian Max MD   [facility] and Infectious Diseases ([facility])    [address] [place] [id number] [phone]              Future tests that were ordered for you today     Open Future Orders        Priority Expected Expires Ordered    CRP inflammation Routine  4/13/2018 4/13/2017    CBC with platelets differential Routine  4/13/2018 4/13/2017    Basic metabolic panel Routine  4/13/2018 4/13/2017            Who to contact     If you have questions or need follow up information about today's clinic visit or your schedule please contact [facility] AND INFECTIOUS DISEASES directly at [phone].  Normal or non-critical lab and imaging results will be communicated to you by MyChart, letter or phone within 4 business days after the clinic has received the results. If you do not hear from us within 7 days, please contact the clinic through [employer] or phone. If you have a critical or abnormal lab result, we will notify you by phone as soon as possible.  Submit refill requests through [employer] or call your pharmacy and they will forward the refill request to us. Please allow 3 business days for your refill to be completed.          Additional Information About Your Visit        [employer] Information     AIM lets you send messages to your doctor, view your test results, renew your prescriptions, schedule appointments and more. To sign up, go to [email or web address] . Click on \"Log in\" on the left side of the screen, which will take you to the Welcome page. Then click on \"Sign up Now\" on the right side of the page.     You will be asked to enter the access code listed below, as well as some personal information. Please follow the directions to create your username and " password.     Your access code is: [email or web address]  Expires: 2017  5:04 PM     Your access code will  in 90 days. If you need help or a new code, please call your [facility] or [phone].        Care EveryWhere ID     This is your Care EveryWhere ID. This could be used by other organizations to access your [place] medical records  [id number]        Your Vitals Were     Pulse Height BMI (Body Mass Index)             96 1.829 m (6') 25.77 kg/m2          Blood Pressure from Last 3 Encounters:   17 104/65   17 103/70   17 122/66    Weight from Last 3 Encounters:   17 86.2 kg (190 lb)   17 88.1 kg (194 lb 3.6 oz)   17 88.9 kg (196 lb)               Primary Care Provider Office Phone # Fax #    Gene Guevara [phone]       [place] [address]        Thank you!     Thank you for choosing [facility] AND INFECTIOUS DISEASES  for your care. Our goal is always to provide you with excellent care. Hearing back from our patients is one way we can continue to improve our services. Please take a few minutes to complete the written survey that you may receive in the mail after your visit with us. Thank you!             Your Updated Medication List - Protect others around you: Learn how to safely use, store and throw away your medicines at www.disposemymeds.org.          This list is accurate as of: 17 11:44 AM.  Always use your most recent med list.                   Brand Name Dispense Instructions for use    acetaminophen 325 MG tablet    TYLENOL    100 tablet    Take 2 tablets (650 mg) by mouth every 4 hours as needed for other (surgical pain)       aspirin 325 MG tablet     120 tablet    Take 1 tablet (325 mg) by mouth daily       atorvastatin 40 MG tablet    LIPITOR    30 tablet    Take 1 tablet (40 mg) by mouth daily       benzonatate 100 MG capsule    TESSALON    42 capsule    Take 1  capsule (100 mg) by mouth 3 times daily as needed for cough       CALAZIME SKIN PROTECTANT Pste     1 Tube    Apply 1 Application topically daily       cefTRIAXone 2 GM vial    ROCEPHIN    14 each    Inject 2 g into the vein every 12 hours       cholecalciferol 1000 UNITS Tabs     30 tablet    Take 1,000 Units by mouth daily       clopidogrel 75 MG tablet    PLAVIX    30 tablet    Take 1 tablet (75 mg) by mouth daily       furosemide 40 MG tablet    LASIX    30 tablet    Take 1 tablet (40 mg) by mouth 2 times daily       insulin glargine 100 UNIT/ML injection    LANTUS    30 mL    Inject 22 Units Subcutaneous every morning (before breakfast)       lisinopril 10 MG tablet    PRINIVIL/ZESTRIL    30 tablet    Take 1 tablet (10 mg) by mouth daily       melatonin 3 MG tablet      Take 1 tablet (3 mg) by mouth nightly as needed       metFORMIN 500 MG tablet    GLUCOPHAGE    60 tablet    Take 1 tablet (500 mg) by mouth 2 times daily (with meals)       metoprolol 25 MG tablet    LOPRESSOR    60 tablet    Take 3 tablets (75 mg) by mouth 2 times daily       metroNIDAZOLE 500 MG tablet    FLAGYL    20 tablet    Take 1 tablet (500 mg) by mouth every 8 hours       mirtazapine 15 MG tablet    REMERON    60 tablet    Take 1 tablet (15 mg) by mouth At Bedtime       nitroglycerin 0.4 MG sublingual tablet    NITROSTAT    25 tablet    For chest pain place 1 tablet under the tongue every 5 minutes for 3 doses. If symptoms persist 5 minutes after 1st dose call 911.       order for DME     1 Device    Equipment being ordered: [facility] shoe       oseltamivir 75 MG capsule    TAMIFLU    30 capsule    Take 1 capsule (75 mg) by mouth daily       polyethylene glycol Packet    MIRALAX/GLYCOLAX    7 packet    Take 17 g by mouth daily as needed for constipation       potassium chloride SA 20 MEQ CR tablet    K-DUR/KLOR-CON M    90 tablet    Take 1 tablet (20 mEq) by mouth daily       ranitidine 150 MG tablet    ZANTAC    60 tablet    Take 1 tablet  (150 mg) by mouth 2 times daily       senna-docusate 8.6-50 MG per tablet    SENOKOT-S;PERICOLACE    100 tablet    Take 1-2 tablets by mouth 2 times daily as needed (constipation )       tamsulosin 0.4 MG capsule    FLOMAX    60 capsule    Take 2 capsules (0.8 mg) by mouth At Bedtime       Vitamin C 500 MG Caps     30 capsule    Take 500 mg by mouth daily

## 2017-04-13 NOTE — PATIENT INSTRUCTIONS
Mr. Manning is doing well and has had 9 weeks of ceftriaxone after surgery. He has had fluctuating CRP that does not appear to correlate with infection, and his most CRP was 17. We will finish his course of metronidazole for anaerobes that may or may not have been a contaminant. Then plan to stop ceftriaxone but continue amoxicillin x 3 additional months to cover streptococcus due to hardware placed early during infection.     Plan:   1. Recheck labs today here in clinic. No need for additional labs tomorrow. Please check CRP and CBC in ~1 week and fax to Dr. Max at [phone]. Then, no additional labs required from infectious diseases standpoint.   2. Continue metronidazole unchanged through 4/20/17  3. Stop ceftriaxone and pull PICC  4. Start amoxicillin 500 mg PO TID x 3 months  5. Return to clinic in 4-6 weeks    Gillian Max MD  Infectious Diseases  [phone]

## 2017-04-13 NOTE — LETTER
4/13/2017       RE: Tad Manning  [address]   [place] [address]     Dear Colleague,    Thank you for referring your patient, Tad Manning, to the [facility] AND INFECTIOUS DISEASES at [facility]. Please see a copy of my visit note below.    [facility] Follow-Up Visit  4/13/2017     Chief Complaint: Vertebral osteomyelitis     HPI:  Mr. Manning is a 77 yo gentleman with PMH CLL in remission, DMII, PVD admitted as transfer 2/9/2017 with thoracic back pain and LE weakness - found to have T9-10 discitis with overlying epidural abscess, now s/p laminectomy/abscess evacuation with intraoperative cultures and  blood cultures showing Streptococcus mitis (oral himanshu). He then also had late growth (day 6) on one operative cultures of mixed anaerobes as well.  TTE was negative for endocarditis. He was started on ceftriaxone Q12H with plans for 6-12 weeks of treatment. He has now had 9 weeks and is slowly regaining his leg strength with significant improvement since last visit, but still unable to stand or walk without heavy assistance. He had increasing CRP just prior to discharge from the [place] [facility] to a TCU without a clear cause so we added metronidazole to his ceftriaxone. He has not noted any side effects from this. He did have a brief hospitalization for pulmonary edema since his last visit with me.  No problems with PICC line. No new fevers. No new rashes. Incision healed well and is not painful. No significant diarrhea.     ROS: ROS as noted above. He reports difficulty with appetite and weight loss, but reports that this is in part due to a loss of taste sensation which predates his infection or any antibiotics and started near the end of 2016.     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)      Coronary artery disease     triple bypass 1995     Diabetes mellitus (H)      Diabetic polyneuropathy (H)      History of blood transfusion      Hyperlipidaemia      Hypertension      Malignant neoplasm (H)     CLL     Noninflammatory pericardial effusion      Osteomyelitis of left foot (H)      PVD (peripheral vascular disease) (H)      Sebaceous cyst      Medications:  Current Outpatient Prescriptions   Medication Sig Dispense Refill     acetaminophen (TYLENOL) 325 MG tablet Take 2 tablets (650 mg) by mouth every 4 hours as needed for other (surgical pain) 100 tablet 0     aspirin 325 MG tablet Take 1 tablet (325 mg) by mouth daily 120 tablet 0     atorvastatin (LIPITOR) 40 MG tablet Take 1 tablet (40 mg) by mouth daily 30 tablet 1     clopidogrel (PLAVIX) 75 MG tablet Take 1 tablet (75 mg) by mouth daily 30 tablet      insulin glargine (LANTUS) 100 UNIT/ML injection Inject 22 Units Subcutaneous every morning (before breakfast) 30 mL 0     lisinopril (PRINIVIL/ZESTRIL) 10 MG tablet Take 1 tablet (10 mg) by mouth daily 30 tablet      melatonin 3 MG tablet Take 1 tablet (3 mg) by mouth nightly as needed       metFORMIN (GLUCOPHAGE) 500 MG tablet Take 1 tablet (500 mg) by mouth 2 times daily (with meals) 60 tablet      metoprolol (LOPRESSOR) 25 MG tablet Take 3 tablets (75 mg) by mouth 2 times daily 60 tablet      mirtazapine (REMERON) 15 MG tablet Take 1 tablet (15 mg) by mouth At Bedtime 60 tablet      polyethylene glycol (MIRALAX/GLYCOLAX) Packet Take 17 g by mouth daily as needed for constipation 7 packet 0     ranitidine (ZANTAC) 150 MG tablet Take 1 tablet (150 mg) by mouth 2 times daily 60 tablet      potassium chloride SA (K-DUR/KLOR-CON M) 20 MEQ CR tablet Take 1 tablet (20 mEq) by mouth daily 90 tablet      senna-docusate (SENOKOT-S;PERICOLACE) 8.6-50 MG per tablet Take 1-2  tablets by mouth 2 times daily as needed (constipation ) 100 tablet 0     Ascorbic Acid (VITAMIN C) 500 MG CAPS Take 500 mg by mouth daily 30 capsule      benzonatate (TESSALON) 100 MG capsule Take 1 capsule (100 mg) by mouth 3 times daily as needed for cough 42 capsule      cefTRIAXone (ROCEPHIN) 2 GM vial Inject 2 g into the vein every 12 hours 14 each      cholecalciferol 1000 UNITS TABS Take 1,000 Units by mouth daily 30 tablet      furosemide (LASIX) 40 MG tablet Take 1 tablet (40 mg) by mouth 2 times daily 30 tablet      metroNIDAZOLE (FLAGYL) 500 MG tablet Take 1 tablet (500 mg) by mouth every 8 hours 20 tablet 0     oseltamivir (TAMIFLU) 75 MG capsule Take 1 capsule (75 mg) by mouth daily 30 capsule      Skin Protectants, Misc. (CALAZIME SKIN PROTECTANT) PSTE Apply 1 Application topically daily 1 Tube      tamsulosin (FLOMAX) 0.4 MG capsule Take 2 capsules (0.8 mg) by mouth At Bedtime 60 capsule      nitroglycerin (NITROSTAT) 0.4 MG sublingual tablet For chest pain place 1 tablet under the tongue every 5 minutes for 3 doses. If symptoms persist 5 minutes after 1st dose call 911. 12 tablet      order for DME Equipment being ordered: DH2 shoe 1 Device 0     Exam:  /65  Pulse 96  Ht 1.829 m (6')  Wt 86.2 kg (190 lb)  BMI 25.77 kg/m2   Gen: Alert and in no distress.   Psych: Normal affect. Alert and oriented.   HEENT: PERRL. No icterus. Oropharynx pink and moist without lesions.   Extremities: Mild peripheral edema. 3/5 right thigh flexion, 5/5 on left. 4/5 right toe flexion, 5/5 on left.   Skin: No rashes or lesions noted. PICC line without erythema or discharge.     Labs:  WBC   Date Value Ref Range Status   04/13/2017 8.3 4.0 - 11.0 10e9/L Final     CRP Inflammation   Date Value Ref Range Status   04/13/2017 7.5 0.0 - 8.0 mg/L Final   04/07/2017 17.0 (H) 0.0 - 8.0 mg/L Final   03/15/2017 81.0 (H) 0.0 - 8.0 mg/L Final   03/13/2017 74.7 (H) 0.0 - 8.0 mg/L Final   03/06/2017 19.9 (H) 0.0 - 8.0 mg/L  Final      Assessment and Plan:  Mr. Manning is doing well and has had 9 weeks of ceftriaxone after surgery. He has had fluctuating CRP that does not appear to correlate with infection, and his CRP today is normal at 7.5. We will finish his course of metronidazole for anaerobes that may or may not have been a contaminant. We will also stop his ceftriaxone but continue amoxicillin x 3 additional months to cover streptococcus due to hardware placed early during infection.     Plan:   1. Recheck labs today here in clinic. No need for additional labs tomorrow. Please check CRP and CBC in ~1 week and fax to Dr. Max at [phone]. Then, no additional labs required from infectious diseases standpoint.   2. Continue metronidazole unchanged through 4/20/17  3. Stop ceftriaxone and pull PICC  4. Start amoxicillin 500 mg PO TID x 3 months  5. Return to clinic in 4-6 weeks    Gillian Max MD  Infectious Diseases  [phone]

## 2017-04-13 NOTE — PROGRESS NOTES
[facility] Follow-Up Visit  4/13/2017     Chief Complaint: Vertebral osteomyelitis     HPI:  Mr. Manning is a 77 yo gentleman with PMH CLL in remission, DMII, PVD admitted as transfer 2/9/2017 with thoracic back pain and LE weakness - found to have T9-10 discitis with overlying epidural abscess, now s/p laminectomy/abscess evacuation with intraoperative cultures and  blood cultures showing Streptococcus mitis (oral himanshu). He then also had late growth (day 6) on one operative cultures of mixed anaerobes as well.  TTE was negative for endocarditis. He was started on ceftriaxone Q12H with plans for 6-12 weeks of treatment. He has now had 9 weeks and is slowly regaining his leg strength with significant improvement since last visit, but still unable to stand or walk without heavy assistance. He had increasing CRP just prior to discharge from the [place] [facility] to a TCU without a clear cause so we added metronidazole to his ceftriaxone. He has not noted any side effects from this. He did have a brief hospitalization for pulmonary edema since his last visit with me.  No problems with PICC line. No new fevers. No new rashes. Incision healed well and is not painful. No significant diarrhea.     ROS: ROS as noted above. He reports difficulty with appetite and weight loss, but reports that this is in part due to a loss of taste sensation which predates his infection or any antibiotics and started near the end of 2016.     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)      Coronary artery disease     triple bypass 1995     Diabetes mellitus (H)      Diabetic polyneuropathy (H)      History of blood transfusion      Hyperlipidaemia      Hypertension       Malignant neoplasm (H)     CLL     Noninflammatory pericardial effusion      Osteomyelitis of left foot (H)      PVD (peripheral vascular disease) (H)      Sebaceous cyst      Medications:  Current Outpatient Prescriptions   Medication Sig Dispense Refill     acetaminophen (TYLENOL) 325 MG tablet Take 2 tablets (650 mg) by mouth every 4 hours as needed for other (surgical pain) 100 tablet 0     aspirin 325 MG tablet Take 1 tablet (325 mg) by mouth daily 120 tablet 0     atorvastatin (LIPITOR) 40 MG tablet Take 1 tablet (40 mg) by mouth daily 30 tablet 1     clopidogrel (PLAVIX) 75 MG tablet Take 1 tablet (75 mg) by mouth daily 30 tablet      insulin glargine (LANTUS) 100 UNIT/ML injection Inject 22 Units Subcutaneous every morning (before breakfast) 30 mL 0     lisinopril (PRINIVIL/ZESTRIL) 10 MG tablet Take 1 tablet (10 mg) by mouth daily 30 tablet      melatonin 3 MG tablet Take 1 tablet (3 mg) by mouth nightly as needed       metFORMIN (GLUCOPHAGE) 500 MG tablet Take 1 tablet (500 mg) by mouth 2 times daily (with meals) 60 tablet      metoprolol (LOPRESSOR) 25 MG tablet Take 3 tablets (75 mg) by mouth 2 times daily 60 tablet      mirtazapine (REMERON) 15 MG tablet Take 1 tablet (15 mg) by mouth At Bedtime 60 tablet      polyethylene glycol (MIRALAX/GLYCOLAX) Packet Take 17 g by mouth daily as needed for constipation 7 packet 0     ranitidine (ZANTAC) 150 MG tablet Take 1 tablet (150 mg) by mouth 2 times daily 60 tablet      potassium chloride SA (K-DUR/KLOR-CON M) 20 MEQ CR tablet Take 1 tablet (20 mEq) by mouth daily 90 tablet      senna-docusate (SENOKOT-S;PERICOLACE) 8.6-50 MG per tablet Take 1-2 tablets by mouth 2 times daily as needed (constipation ) 100 tablet 0     Ascorbic Acid (VITAMIN C) 500 MG CAPS Take 500 mg by mouth daily 30 capsule      benzonatate (TESSALON) 100 MG capsule Take 1 capsule (100 mg) by mouth 3 times daily as needed for cough 42 capsule      cefTRIAXone (ROCEPHIN) 2 GM vial Inject 2 g  into the vein every 12 hours 14 each      cholecalciferol 1000 UNITS TABS Take 1,000 Units by mouth daily 30 tablet      furosemide (LASIX) 40 MG tablet Take 1 tablet (40 mg) by mouth 2 times daily 30 tablet      metroNIDAZOLE (FLAGYL) 500 MG tablet Take 1 tablet (500 mg) by mouth every 8 hours 20 tablet 0     oseltamivir (TAMIFLU) 75 MG capsule Take 1 capsule (75 mg) by mouth daily 30 capsule      Skin Protectants, Misc. (CALAZIME SKIN PROTECTANT) PSTE Apply 1 Application topically daily 1 Tube      tamsulosin (FLOMAX) 0.4 MG capsule Take 2 capsules (0.8 mg) by mouth At Bedtime 60 capsule      nitroglycerin (NITROSTAT) 0.4 MG sublingual tablet For chest pain place 1 tablet under the tongue every 5 minutes for 3 doses. If symptoms persist 5 minutes after 1st dose call 911. 76 tablet      order for DME Equipment being ordered: DH2 shoe 1 Device 0     Exam:  /65  Pulse 96  Ht 1.829 m (6')  Wt 86.2 kg (190 lb)  BMI 25.77 kg/m2   Gen: Alert and in no distress.   Psych: Normal affect. Alert and oriented.   HEENT: PERRL. No icterus. Oropharynx pink and moist without lesions.   Extremities: Mild peripheral edema. 3/5 right thigh flexion, 5/5 on left. 4/5 right toe flexion, 5/5 on left.   Skin: No rashes or lesions noted. PICC line without erythema or discharge.     Labs:  WBC   Date Value Ref Range Status   04/13/2017 8.3 4.0 - 11.0 10e9/L Final     CRP Inflammation   Date Value Ref Range Status   04/13/2017 7.5 0.0 - 8.0 mg/L Final   04/07/2017 17.0 (H) 0.0 - 8.0 mg/L Final   03/15/2017 81.0 (H) 0.0 - 8.0 mg/L Final   03/13/2017 74.7 (H) 0.0 - 8.0 mg/L Final   03/06/2017 19.9 (H) 0.0 - 8.0 mg/L Final      Assessment and Plan:  Mr. Manning is doing well and has had 9 weeks of ceftriaxone after surgery. He has had fluctuating CRP that does not appear to correlate with infection, and his CRP today is normal at 7.5. We will finish his course of metronidazole for anaerobes that may or may not have been a  contaminant. We will also stop his ceftriaxone but continue amoxicillin x 3 additional months to cover streptococcus due to hardware placed early during infection.     Plan:   1. Recheck labs today here in clinic. No need for additional labs tomorrow. Please check CRP and CBC in ~1 week and fax to Dr. Max at [phone]. Then, no additional labs required from infectious diseases standpoint.   2. Continue metronidazole unchanged through 4/20/17  3. Stop ceftriaxone and pull PICC  4. Start amoxicillin 500 mg PO TID x 3 months  5. Return to clinic in 4-6 weeks    Gillian Max MD  Infectious Diseases  [phone]

## 2017-04-13 NOTE — NURSING NOTE
Chief Complaint   Patient presents with     RECHECK     3 week follow up       Initial /65  Pulse 96  Ht 1.829 m (6')  Wt 86.2 kg (190 lb)  BMI 25.77 kg/m2 Estimated body mass index is 25.77 kg/(m^2) as calculated from the following:    Height as of this encounter: 1.829 m (6').    Weight as of this encounter: 86.2 kg (190 lb).  Medication Reconciliation: complete

## 2017-04-14 NOTE — PROGRESS NOTES
Neurosurgery clinic post   Date of visit: 4/17/2017    Procedure:     DOS 2/12/17 Dr Trent   DIAGNOSIS:   1. Thoracic spine instability.   2. Osteomyelitis and discitis T9-T10 s/p laminectomy.   PROCEDURES PERFORMED:   1. T7-T12 posterior segmental instrumentation.   2. Transpedicular, transforaminal right-sided T9-T10 interbody fusion.   3. Posterior arthrodesis T7-T12.  4. Use of intraoperative neuro-navigation.   5. Use of intraoperative O-arm.   6. Use of intraoperative cell saver.   7. Use of allograft bone.     OPERATIVE INDICATIONS: Mr. Tad Manning is a 76-year-old male who presented to the Neurosurgery Service at the [facility] with concerns of loss of ambulation over days. He had fallen in the early part of 02/2017 and had weakness in bilateral extremities with difficulty ambulation which progressed to complete inability to ambulate. On imaging of his spine, he was found to have an infection spondylitis at T9-T10 with a paraspinal abscess. For spinal cord compression, he underwent a T9-T10 laminectomy and partial T9 transpedicular disk space abscess removal on 02/09/2017. He subsequently was started on antibiotics and was followed by Infectious Disease team. For concerns of thoracic instability, he was then electively brought to the operating room today on 02/12/2017 for posterior stabilization.     DOS: 02/09/2017  Dr Trent  DIAGNOSIS: Epidural abscess, osteomyelitis.   PROCEDURES PERFORMED:   1. T9-T10 laminectomy.   2. T9 transpedicular left-sided transpedicular disk space abscess evacuation.       INDICATION FOR PROCEDURE: Mr. Tad Manning is a 76-year-old male with history of coronary artery disease, CLL in remission,  diabetes, hypertension, peripheral vascular disease, on aspirin and Plavix, who has had progressive lower extremity weakness from a few days prior. He now reports that he is minimally to move his legs. An MRI was done which showed severe T9-10 cord  impingement from epidural abscess. For these reasons, he was brought to the operating room for evacuation.     HPI:   Tad Manning is a pleasant 76 year old male now about 2 months status post the above procedures.   The procedures were without incident.  After the fusion  he was admitted to the floor for post-operative cares. On post operative day 1, he was doing well and working with therapies, sitting on the edge of the bed with physical therapy. On post operative day 2, his drain was removed, which had minimal serosanguinous output. Prior to discharge, voiding without a schuler, eating a regular diet, pain was well controlled. He continued to progress with therapies, to the point of progression where his strength was antigravity in lower extremities and he was able to stand upright. It was recommended he discharge to acute rehabilitation for further rehabilitation cares, and was therefore discharge in stable condition.   Exam upon discharge  Mental Status: alert and oriented x3   Cranial Nerves: grossly normal   Sensory: absent to light touch in toes and plantar aspects of feet b/l; also diminished to LT at finger tips b/l   Strength: 4+/5 in bilateral upper extremities   HF  KE  DF  EHL  PF   R  1/5 2/5 3/5 3/5 4/5  L  1/5 3/5 3/5 3/5 4/5   Reflexes: 1+ at patellar and absent at Achilles b/l  He's now been discharged from acute inpatient rehab to TCU on 4/8/17.  Since then he continues to make gains but he feels frustrated sometimes. He has minimal pain.  He saw ID last week who switched him over to all oral meds (see below).  He presents for routine 6 week check.    STATUS REPORT  WORK:         Is not back at work.  Position: Retired  ACTIVITY:     Has been following activity restrictions and working with PT   MEDS:                  Pain         Tylenol rarely         NSAIDS   None         FU:           ID:   Dr Max  Seen last week. Continue Flagyl thru 4/20.   Started Amoxicillin  X3 months.      Patient Supplied Answers To the UC Pain Questionnaire  UC Pain -  Patient Entered Questionnaire/Answers 4/17/2017   How would you describe the pain? dull, aching   What number best describes your LOWEST pain in past 24 hours:  0 = No pain  to  10 = Worst pain imaginable 0   What number best describes your WORST pain in past 24 hours:  0 = No pain  to  10 = Worst pain imaginable 0       Current Outpatient Prescriptions:      acetaminophen (TYLENOL) 325 MG tablet, Take 2 tablets (650 mg) by mouth every 4 hours as needed for other (surgical pain), Disp: 100 tablet, Rfl: 0     aspirin 325 MG tablet, Take 1 tablet (325 mg) by mouth daily, Disp: 120 tablet, Rfl: 0     atorvastatin (LIPITOR) 40 MG tablet, Take 1 tablet (40 mg) by mouth daily, Disp: 30 tablet, Rfl: 1     clopidogrel (PLAVIX) 75 MG tablet, Take 1 tablet (75 mg) by mouth daily, Disp: 30 tablet, Rfl:      insulin glargine (LANTUS) 100 UNIT/ML injection, Inject 22 Units Subcutaneous every morning (before breakfast), Disp: 30 mL, Rfl: 0     lisinopril (PRINIVIL/ZESTRIL) 10 MG tablet, Take 1 tablet (10 mg) by mouth daily, Disp: 30 tablet, Rfl:      melatonin 3 MG tablet, Take 1 tablet (3 mg) by mouth nightly as needed, Disp: , Rfl:      metFORMIN (GLUCOPHAGE) 500 MG tablet, Take 1 tablet (500 mg) by mouth 2 times daily (with meals), Disp: 60 tablet, Rfl:      metoprolol (LOPRESSOR) 25 MG tablet, Take 3 tablets (75 mg) by mouth 2 times daily, Disp: 60 tablet, Rfl:      mirtazapine (REMERON) 15 MG tablet, Take 1 tablet (15 mg) by mouth At Bedtime, Disp: 60 tablet, Rfl:      polyethylene glycol (MIRALAX/GLYCOLAX) Packet, Take 17 g by mouth daily as needed for constipation, Disp: 7 packet, Rfl: 0     ranitidine (ZANTAC) 150 MG tablet, Take 1 tablet (150 mg) by mouth 2 times daily, Disp: 60 tablet, Rfl:      potassium chloride SA (K-DUR/KLOR-CON M) 20 MEQ CR tablet, Take 1 tablet (20 mEq) by mouth daily, Disp: 90 tablet, Rfl:      senna-docusate  (SENOKOT-S;PERICOLACE) 8.6-50 MG per tablet, Take 1-2 tablets by mouth 2 times daily as needed (constipation ), Disp: 100 tablet, Rfl: 0     Ascorbic Acid (VITAMIN C) 500 MG CAPS, Take 500 mg by mouth daily, Disp: 30 capsule, Rfl:      benzonatate (TESSALON) 100 MG capsule, Take 1 capsule (100 mg) by mouth 3 times daily as needed for cough, Disp: 42 capsule, Rfl:      cefTRIAXone (ROCEPHIN) 2 GM vial, Inject 2 g into the vein every 12 hours, Disp: 14 each, Rfl:      cholecalciferol 1000 UNITS TABS, Take 1,000 Units by mouth daily, Disp: 30 tablet, Rfl:      furosemide (LASIX) 40 MG tablet, Take 1 tablet (40 mg) by mouth 2 times daily, Disp: 30 tablet, Rfl:      metroNIDAZOLE (FLAGYL) 500 MG tablet, Take 1 tablet (500 mg) by mouth every 8 hours, Disp: 20 tablet, Rfl: 0     oseltamivir (TAMIFLU) 75 MG capsule, Take 1 capsule (75 mg) by mouth daily, Disp: 30 capsule, Rfl:      Skin Protectants, Misc. (CALAZIME SKIN PROTECTANT) PSTE, Apply 1 Application topically daily, Disp: 1 Tube, Rfl:      tamsulosin (FLOMAX) 0.4 MG capsule, Take 2 capsules (0.8 mg) by mouth At Bedtime, Disp: 60 capsule, Rfl:      nitroglycerin (NITROSTAT) 0.4 MG sublingual tablet, For chest pain place 1 tablet under the tongue every 5 minutes for 3 doses. If symptoms persist 5 minutes after 1st dose call 911., Disp: 25 tablet, Rfl:      order for DME, Equipment being ordered: DH2 shoe, Disp: 1 Device, Rfl: 0  Allergies   Allergen Reactions     Blood Transfusion Related (Informational Only) Other (See Comments)     Patient has a history of a clinically significant antibody against RBC antigens.  A delay in compatible RBCs may occur.    PMH, SOC HIST, FAM HIST, PROBLEM LIST:  All reviewed in EPIC.    OBJECTIVE:  BP 91/57 (BP Location: Right arm, Patient Position: Chair, Cuff Size: Adult Large)  Pulse 96  Resp 20  Ht 1.829 m (6')  Wt 86.2 kg (190 lb)  SpO2 98%  BMI 25.77 kg/m2    Imaging:  AP Lat T spine taken today  Stable postsurgical changes  of posterior fusion instrumentation  between T7 and T12. Similar alignment given differences in positioning  of interbody disc graft at the level of T9 and T10 for disc-centric  destruction.  2. Mild to moderate degenerative changes of the spine.    EXAM:  Well developed well nourished male found seated comfortably in wheelchair.  No apparent distress. He is accompanied by adult children.  A&O X3.  Mood and affect WNL. Language and fund of knowledge intact.  Is unable to sit and rise independently.   He has a nicely healed incision..  Strength: 5-/5 in bilateral upper extremities   ( Note: All groups improved by about 1 point since discharge)  HF  KE  DF  EHL  PF   R  3/5 4/5 4/5 4/5 4/5  L  3/5 4/5 4/5 4/5 4/5    Assessment:  1. Osteomyelitis of thoracic spine (H)    2. Post-operative state    3.      Tad Manning is doing well and making good gains after his thoracic decompression for abscess.  His strength is returning well.  The wound is healthy.     PLAN:  We discussed wound care.   *  May swim or bathe as desired  We discussed medication.    *  FYI: In general we prescribe narcotics for pain management in the post operative recovery phase generally 2-6 weeks post op, depending on the surgery performed.  Pre-op our patients are advised that by 6 weeks post op we anticipate they will no longer require narcotic.  *  Medications prescribed today:   None today   We discussed activities and return to work.  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    We discussed follow up  *   Get plain films today when he leaves. I'll brittany him if there's anything amiss.  (they were good)   *  Return to clinic in 4 weeks with me. (That will be 12 weeks post op)  Imaging for that visit: Plain XR and standing scoli films. If they can't be done standing then don't do them.  *  All the patient's questions have been answered and they demonstrate good understanding of the above.     *   The patient has our contact information and is aware that he should call if he has questions comments or concerns.     We appreciate the opportunity to be of service in the care of this pleasant patient.  Please do call if there is anything more we can do    Marge Richards PA-C  [facility]  Department of Neurosurgery  Phone: [phone]  Fax: [phone]

## 2017-04-17 ENCOUNTER — OFFICE VISIT (OUTPATIENT)
Dept: NEUROSURGERY | Facility: CLINIC | Age: 77
End: 2017-04-17

## 2017-04-17 VITALS
DIASTOLIC BLOOD PRESSURE: 57 MMHG | SYSTOLIC BLOOD PRESSURE: 91 MMHG | HEART RATE: 96 BPM | BODY MASS INDEX: 25.73 KG/M2 | HEIGHT: 72 IN | OXYGEN SATURATION: 98 % | RESPIRATION RATE: 20 BRPM | WEIGHT: 190 LBS

## 2017-04-17 DIAGNOSIS — Z98.890 POST-OPERATIVE STATE: ICD-10-CM

## 2017-04-17 DIAGNOSIS — M46.24 OSTEOMYELITIS OF THORACIC SPINE (H): Primary | ICD-10-CM

## 2017-04-17 ASSESSMENT — PAIN SCALES - GENERAL: PAINLEVEL: NO PAIN (0)

## 2017-04-17 NOTE — MR AVS SNAPSHOT
After Visit Summary   4/17/2017    Tad Manning    MRN: [id number]           Patient Information     Date Of Birth          1940        Visit Information        Provider Department      4/17/2017 4:30 PM Marge Richards PA-C [facility] Neurosurgery        Today's Diagnoses     Osteomyelitis of thoracic spine (H)    -  1    Post-operative state           Follow-ups after your visit        Your next 10 appointments already scheduled     Apr 17, 2017  5:30 PM CDT   XR THORACIC SPINE 2 VIEWS with [email or web address]   [facility] ([facility])    [address] [place] [address]  4th [facility] [id number] [phone]           Please bring a list of your current medicines to your exam. (Include vitamins, minerals and over-thecounter medicines.) Leave your valuables at home.  Tell your doctor if there is a chance you may be pregnant.  You do not need to do anything special for this exam.            May 01, 2017 11:00 AM CDT   (Arrive by 10:45 AM)   Return Visit with Clarisse Valdes MD   [facility] ([facility])    [address] [place] [address] [id number] [phone]            May 15, 2017  3:00 PM CDT   XR SPINE COMPLETE 2 VIEWS with UCXR1   [facility] ([facility])    [address] [place] [address] [id number] [phone]           Please bring a list of your current medicines to your exam. (Include vitamins, minerals and over-thecounter medicines.) Leave your valuables at home.  Tell your doctor if there is a chance you may be pregnant.  You do not need to do anything special for this exam.            May 15, 2017  4:00 PM CDT   (Arrive by 3:45 PM)   Return Visit with LINA Wilkins [facility] Neurosurgery ([facility])    [address] [place] [id number] [phone]            May 25, 2017  10:30 AM CDT   (Arrive by 10:15 AM)   Return Visit with Gillian Max MD   [facility] ([facility])    [address] [facility]  3rd [facility] [address] [phone]              Future tests that were ordered for you today     Open Future Orders        Priority Expected Expires Ordered    X-ray Spine complete (Cervicothoracolumbar AP and lateral - standing views preferred) [[id number]] Routine 2017    XR Thoracic Spine 2 Views Routine 5/15/2017 2018 2017            Who to contact     Please call your clinic at [phone] to:    Ask questions about your health    Make or cancel appointments    Discuss your medicines    Learn about your test results    Speak to your doctor   If you have compliments or concerns about an experience at your clinic, or if you wish to file a complaint, please contact [facility] Patient Relations at [phone] or email us at [email or web address]         Additional Information About Your Visit        [employer] Information     Symetricat is an electronic gateway that provides easy, online access to your medical records. With Dada, you can request a clinic appointment, read your test results, renew a prescription or communicate with your care team.     To sign up for Dada visit the website at [email or web address]   You will be asked to enter the access code listed below, as well as some personal information. Please follow the directions to create your username and password.     Your access code is: [email or web address]  Expires: 2017  5:04 PM     Your access code will  in 90 days. If you need help or a new code, please contact your [facility] or call [phone] for assistance.        Care EveryWhere ID     This is your Care EveryWhere ID. This could be used by other organizations to access your  [place] medical records  [id number]        Your Vitals Were     Pulse Respirations Height Pulse Oximetry BMI (Body Mass Index)       96 20 1.829 m (6') 98% 25.77 kg/m2        Blood Pressure from Last 3 Encounters:   04/17/17 91/57   04/13/17 104/65   04/08/17 103/70    Weight from Last 3 Encounters:   04/17/17 86.2 kg (190 lb)   04/13/17 86.2 kg (190 lb)   04/08/17 88.1 kg (194 lb 3.6 oz)              We Performed the Following     XR Thoracic Spine 2 Views        Primary Care Provider Office Phone # Fax #    Gene Guevara [phone]       [address] [address]        Thank you!     Thank you for choosing [facility]  for your care. Our goal is always to provide you with excellent care. Hearing back from our patients is one way we can continue to improve our services. Please take a few minutes to complete the written survey that you may receive in the mail after your visit with us. Thank you!             Your Updated Medication List - Protect others around you: Learn how to safely use, store and throw away your medicines at www.disposemymeds.org.          This list is accurate as of: 4/17/17  5:24 PM.  Always use your most recent med list.                   Brand Name Dispense Instructions for use    acetaminophen 325 MG tablet    TYLENOL    100 tablet    Take 2 tablets (650 mg) by mouth every 4 hours as needed for other (surgical pain)       aspirin 325 MG tablet     120 tablet    Take 1 tablet (325 mg) by mouth daily       atorvastatin 40 MG tablet    LIPITOR    30 tablet    Take 1 tablet (40 mg) by mouth daily       benzonatate 100 MG capsule    TESSALON    42 capsule    Take 1 capsule (100 mg) by mouth 3 times daily as needed for cough       CALAZIME SKIN PROTECTANT Pste     1 Tube    Apply 1 Application topically daily       cefTRIAXone 2 GM vial    ROCEPHIN    14 each    Inject 2 g into the vein every 12 hours       cholecalciferol  1000 UNITS Tabs     30 tablet    Take 1,000 Units by mouth daily       clopidogrel 75 MG tablet    PLAVIX    30 tablet    Take 1 tablet (75 mg) by mouth daily       furosemide 40 MG tablet    LASIX    30 tablet    Take 1 tablet (40 mg) by mouth 2 times daily       insulin glargine 100 UNIT/ML injection    LANTUS    30 mL    Inject 22 Units Subcutaneous every morning (before breakfast)       lisinopril 10 MG tablet    PRINIVIL/ZESTRIL    30 tablet    Take 1 tablet (10 mg) by mouth daily       melatonin 3 MG tablet      Take 1 tablet (3 mg) by mouth nightly as needed       metFORMIN 500 MG tablet    GLUCOPHAGE    60 tablet    Take 1 tablet (500 mg) by mouth 2 times daily (with meals)       metoprolol 25 MG tablet    LOPRESSOR    60 tablet    Take 3 tablets (75 mg) by mouth 2 times daily       metroNIDAZOLE 500 MG tablet    FLAGYL    20 tablet    Take 1 tablet (500 mg) by mouth every 8 hours       mirtazapine 15 MG tablet    REMERON    60 tablet    Take 1 tablet (15 mg) by mouth At Bedtime       nitroglycerin 0.4 MG sublingual tablet    NITROSTAT    25 tablet    For chest pain place 1 tablet under the tongue every 5 minutes for 3 doses. If symptoms persist 5 minutes after 1st dose call 911.       order for DME     1 Device    Equipment being ordered: 2 shoe       oseltamivir 75 MG capsule    TAMIFLU    30 capsule    Take 1 capsule (75 mg) by mouth daily       polyethylene glycol Packet    MIRALAX/GLYCOLAX    7 packet    Take 17 g by mouth daily as needed for constipation       potassium chloride SA 20 MEQ CR tablet    K-DUR/KLOR-CON M    90 tablet    Take 1 tablet (20 mEq) by mouth daily       ranitidine 150 MG tablet    ZANTAC    60 tablet    Take 1 tablet (150 mg) by mouth 2 times daily       senna-docusate 8.6-50 MG per tablet    SENOKOT-S;PERICOLACE    100 tablet    Take 1-2 tablets by mouth 2 times daily as needed (constipation )       tamsulosin 0.4 MG capsule    FLOMAX    60 capsule    Take 2 capsules (0.8 mg)  by mouth At Bedtime       Vitamin C 500 MG Caps     30 capsule    Take 500 mg by mouth daily

## 2017-04-17 NOTE — LETTER
4/17/2017       RE: Tad Manning  [address]   [facility] [address]     Dear Colleague,    Thank you for referring your patient, Tad Manning, to the [facility] NEUROSURGERY at [facility]. Please see a copy of my visit note below.      Neurosurgery clinic post   Date of visit: 4/17/2017    Procedure:     DOS 2/12/17 Dr Trent   DIAGNOSIS:   1. Thoracic spine instability.   2. Osteomyelitis and discitis T9-T10 s/p laminectomy.   PROCEDURES PERFORMED:   1. T7-T12 posterior segmental instrumentation.   2. Transpedicular, transforaminal right-sided T9-T10 interbody fusion.   3. Posterior arthrodesis T7-T12.  4. Use of intraoperative neuro-navigation.   5. Use of intraoperative O-arm.   6. Use of intraoperative cell saver.   7. Use of allograft bone.     OPERATIVE INDICATIONS: Mr. Tad Manning is a 76-year-old male who presented to the Neurosurgery Service at the [facility] with concerns of loss of ambulation over days. He had fallen in the early part of 02/2017 and had weakness in bilateral extremities with difficulty ambulation which progressed to complete inability to ambulate. On imaging of his spine, he was found to have an infection spondylitis at T9-T10 with a paraspinal abscess. For spinal cord compression, he underwent a T9-T10 laminectomy and partial T9 transpedicular disk space abscess removal on 02/09/2017. He subsequently was started on antibiotics and was followed by Infectious Disease team. For concerns of thoracic instability, he was then electively brought to the operating room today on 02/12/2017 for posterior stabilization.     DOS: 02/09/2017  Dr Trent  DIAGNOSIS: Epidural abscess, osteomyelitis.   PROCEDURES PERFORMED:   1. T9-T10 laminectomy.   2. T9 transpedicular left-sided transpedicular disk space abscess evacuation.       INDICATION FOR PROCEDURE: Mr. Tad Manning is a 76-year-old male with  history of coronary artery disease, CLL in remission,  diabetes, hypertension, peripheral vascular disease, on aspirin and Plavix, who has had progressive lower extremity weakness from a few days prior. He now reports that he is minimally to move his legs. An MRI was done which showed severe T9-10 cord impingement from epidural abscess. For these reasons, he was brought to the operating room for evacuation.     HPI:   Tad Manning is a pleasant 76 year old male now about 2 months status post the above procedures.   The procedures were without incident.  After the fusion  he was admitted to the floor for post-operative cares. On post operative day 1, he was doing well and working with therapies, sitting on the edge of the bed with physical therapy. On post operative day 2, his drain was removed, which had minimal serosanguinous output. Prior to discharge, voiding without a schuler, eating a regular diet, pain was well controlled. He continued to progress with therapies, to the point of progression where his strength was antigravity in lower extremities and he was able to stand upright. It was recommended he discharge to acute rehabilitation for further rehabilitation cares, and was therefore discharge in stable condition.   Exam upon discharge  Mental Status: alert and oriented x3   Cranial Nerves: grossly normal   Sensory: absent to light touch in toes and plantar aspects of feet b/l; also diminished to LT at finger tips b/l   Strength: 4+/5 in bilateral upper extremities   HF  KE  DF  EHL  PF   R  1/5 2/5 3/5 3/5 4/5  L  1/5 3/5 3/5 3/5 4/5   Reflexes: 1+ at patellar and absent at Achilles b/l  He's now been discharged from acute inpatient rehab to TCU on 4/8/17.  Since then he continues to make gains but he feels frustrated sometimes. He has minimal pain.  He saw ID last week who switched him over to all oral meds (see below).  He presents for routine 6 week check.    STATUS REPORT  WORK:         Is not back  at work.  Position: Retired  ACTIVITY:     Has been following activity restrictions and working with PT   MEDS:                  Pain         Tylenol rarely         NSAIDS   None         FU:           ID:   Dr Max  Seen last week. Continue Flagyl thru 4/20.   Started Amoxicillin  X3 months.     Patient Supplied Answers To the [facility] Pain Questionnaire  [facility] Pain -  Patient Entered Questionnaire/Answers 4/17/2017   How would you describe the pain? dull, aching   What number best describes your LOWEST pain in past 24 hours:  0 = No pain  to  10 = Worst pain imaginable 0   What number best describes your WORST pain in past 24 hours:  0 = No pain  to  10 = Worst pain imaginable 0       Current Outpatient Prescriptions:      acetaminophen (TYLENOL) 325 MG tablet, Take 2 tablets (650 mg) by mouth every 4 hours as needed for other (surgical pain), Disp: 100 tablet, Rfl: 0     aspirin 325 MG tablet, Take 1 tablet (325 mg) by mouth daily, Disp: 120 tablet, Rfl: 0     atorvastatin (LIPITOR) 40 MG tablet, Take 1 tablet (40 mg) by mouth daily, Disp: 30 tablet, Rfl: 1     clopidogrel (PLAVIX) 75 MG tablet, Take 1 tablet (75 mg) by mouth daily, Disp: 30 tablet, Rfl:      insulin glargine (LANTUS) 100 UNIT/ML injection, Inject 22 Units Subcutaneous every morning (before breakfast), Disp: 30 mL, Rfl: 0     lisinopril (PRINIVIL/ZESTRIL) 10 MG tablet, Take 1 tablet (10 mg) by mouth daily, Disp: 30 tablet, Rfl:      melatonin 3 MG tablet, Take 1 tablet (3 mg) by mouth nightly as needed, Disp: , Rfl:      metFORMIN (GLUCOPHAGE) 500 MG tablet, Take 1 tablet (500 mg) by mouth 2 times daily (with meals), Disp: 60 tablet, Rfl:      metoprolol (LOPRESSOR) 25 MG tablet, Take 3 tablets (75 mg) by mouth 2 times daily, Disp: 60 tablet, Rfl:      mirtazapine (REMERON) 15 MG tablet, Take 1 tablet (15 mg) by mouth At Bedtime, Disp: 60 tablet, Rfl:      polyethylene glycol (MIRALAX/GLYCOLAX) Packet, Take 17 g by mouth daily as needed for constipation,  Disp: 7 packet, Rfl: 0     ranitidine (ZANTAC) 150 MG tablet, Take 1 tablet (150 mg) by mouth 2 times daily, Disp: 60 tablet, Rfl:      potassium chloride SA (K-DUR/KLOR-CON M) 20 MEQ CR tablet, Take 1 tablet (20 mEq) by mouth daily, Disp: 90 tablet, Rfl:      senna-docusate (SENOKOT-S;PERICOLACE) 8.6-50 MG per tablet, Take 1-2 tablets by mouth 2 times daily as needed (constipation ), Disp: 100 tablet, Rfl: 0     Ascorbic Acid (VITAMIN C) 500 MG CAPS, Take 500 mg by mouth daily, Disp: 30 capsule, Rfl:      benzonatate (TESSALON) 100 MG capsule, Take 1 capsule (100 mg) by mouth 3 times daily as needed for cough, Disp: 42 capsule, Rfl:      cefTRIAXone (ROCEPHIN) 2 GM vial, Inject 2 g into the vein every 12 hours, Disp: 14 each, Rfl:      cholecalciferol 1000 UNITS TABS, Take 1,000 Units by mouth daily, Disp: 30 tablet, Rfl:      furosemide (LASIX) 40 MG tablet, Take 1 tablet (40 mg) by mouth 2 times daily, Disp: 30 tablet, Rfl:      metroNIDAZOLE (FLAGYL) 500 MG tablet, Take 1 tablet (500 mg) by mouth every 8 hours, Disp: 20 tablet, Rfl: 0     oseltamivir (TAMIFLU) 75 MG capsule, Take 1 capsule (75 mg) by mouth daily, Disp: 30 capsule, Rfl:      Skin Protectants, Misc. (CALAZIME SKIN PROTECTANT) PSTE, Apply 1 Application topically daily, Disp: 1 Tube, Rfl:      tamsulosin (FLOMAX) 0.4 MG capsule, Take 2 capsules (0.8 mg) by mouth At Bedtime, Disp: 60 capsule, Rfl:      nitroglycerin (NITROSTAT) 0.4 MG sublingual tablet, For chest pain place 1 tablet under the tongue every 5 minutes for 3 doses. If symptoms persist 5 minutes after 1st dose call 911., Disp: 25 tablet, Rfl:      order for DME, Equipment being ordered: 2 shoe, Disp: 1 Device, Rfl: 0  Allergies   Allergen Reactions     Blood Transfusion Related (Informational Only) Other (See Comments)     Patient has a history of a clinically significant antibody against RBC antigens.  A delay in compatible RBCs may occur.    PMH, SOC HIST, FAM HIST, PROBLEM LIST:  All  reviewed in EPIC.    OBJECTIVE:  BP 91/57 (BP Location: Right arm, Patient Position: Chair, Cuff Size: Adult Large)  Pulse 96  Resp 20  Ht 1.829 m (6')  Wt 86.2 kg (190 lb)  SpO2 98%  BMI 25.77 kg/m2    Imaging:  AP Lat T spine taken today  Stable postsurgical changes of posterior fusion instrumentation  between T7 and T12. Similar alignment given differences in positioning  of interbody disc graft at the level of T9 and T10 for disc-centric  destruction.  2. Mild to moderate degenerative changes of the spine.    EXAM:  Well developed well nourished male found seated comfortably in wheelchair.  No apparent distress. He is accompanied by adult children.  A&O X3.  Mood and affect WNL. Language and fund of knowledge intact.  Is unable to sit and rise independently.   He has a nicely healed incision..  Strength: 5-/5 in bilateral upper extremities   ( Note: All groups improved by about 1 point since discharge)  HF  KE  DF  EHL  PF   R  3/5 4/5 4/5 4/5 4/5  L  3/5 4/5 4/5 4/5 4/5    Assessment:  1. Osteomyelitis of thoracic spine (H)    2. Post-operative state    3.      Tad Manning is doing well and making good gains after his thoracic decompression for abscess.  His strength is returning well.  The wound is healthy.     PLAN:  We discussed wound care.   *  May swim or bathe as desired  We discussed medication.    *  FYI: In general we prescribe narcotics for pain management in the post operative recovery phase generally 2-6 weeks post op, depending on the surgery performed.  Pre-op our patients are advised that by 6 weeks post op we anticipate they will no longer require narcotic.  *  Medications prescribed today:   None today   We discussed activities and return to work.  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    We discussed follow up  *   Get plain films today when he leaves. I'll brittany him if there's anything amiss.  (they were good)   *  Return to  clinic in 4 weeks with me. (That will be 12 weeks post op)  Imaging for that visit: Plain XR and standing scoli films. If they can't be done standing then don't do them.  *  All the patient's questions have been answered and they demonstrate good understanding of the above.    *   The patient has our contact information and is aware that he should call if he has questions comments or concerns.     We appreciate the opportunity to be of service in the care of this pleasant patient.  Please do call if there is anything more we can do    Marge Richards PA-C  [facility]  Department of Neurosurgery  Phone: [phone]  Fax: [phone]

## 2017-04-28 ENCOUNTER — PRE VISIT (OUTPATIENT)
Dept: CARDIOLOGY | Facility: CLINIC | Age: 77
End: 2017-04-28

## 2017-04-28 DIAGNOSIS — I25.10 CORONARY ARTERY DISEASE INVOLVING NATIVE CORONARY ARTERY OF NATIVE HEART WITHOUT ANGINA PECTORIS: Primary | ICD-10-CM

## 2017-04-28 NOTE — TELEPHONE ENCOUNTER
Previsit nursing summary    HPI: Tad Manning is a 75 y/o male with history of CAD s/p three veseel CABG in 1995, cardiomyopathy with LVEF 25-30%, atrial fibrillation, DM-II, CLL in remission, HTN and PVD s/p iliac artery stents and left lower extremity bypass who presents with shortness of breath    Procedures:    ECHO (2/24/2017)  Technically difficult study.The patient's rhythm is atrial fibrillation.  The left ventricle is severely dilated. Left ventricular systolic function is  severely reduced, ejection fraction is estimated at 25-30%. There is severe  global hypokinesis.  Right ventricular systolic function is mildly reduced.  The right ventricular systolic pressure is approximated at 15 mmHg plus the  right atrial pressure. IVC is plethoric and estimated mean RA pressure is 15  mmHg.  No pericardial effusion present.      ECHO (1/21/2017)  The left ventricle is mildly dilated at 6.0 cm. Left ventricular systolic  function is mild to moderately reduced as the visual ejection fraction is  estimated at 40-45%.  The biplane calculated LVEF = 43%. This decrease in the LVEF is due to mild  global hypokinesia and moderate to severe inferolateral wall hypokinesis.  Grade I or early diastolic dysfunction is noted  There is trace tricuspid regurgitation. The right ventricular systolic  pressure is normal approximated at 16mmHg plus the right atrial pressure.  The left atrium is moderately dilated by volume criteria at 41.6 ml/m2.  There is mild trileaflet aortic sclerosis but no aortic stenosis or  regurgitation is present.

## 2017-05-01 ENCOUNTER — OFFICE VISIT (OUTPATIENT)
Dept: CARDIOLOGY | Facility: CLINIC | Age: 77
End: 2017-05-01
Attending: INTERNAL MEDICINE
Payer: MEDICARE

## 2017-05-01 VITALS
HEART RATE: 56 BPM | SYSTOLIC BLOOD PRESSURE: 92 MMHG | WEIGHT: 185 LBS | OXYGEN SATURATION: 98 % | BODY MASS INDEX: 25.09 KG/M2 | DIASTOLIC BLOOD PRESSURE: 53 MMHG

## 2017-05-01 DIAGNOSIS — I50.9 CHF (NYHA CLASS II, ACC/AHA STAGE C) (H): ICD-10-CM

## 2017-05-01 DIAGNOSIS — I25.10 CORONARY ARTERY DISEASE INVOLVING NATIVE CORONARY ARTERY OF NATIVE HEART WITHOUT ANGINA PECTORIS: Primary | ICD-10-CM

## 2017-05-01 PROCEDURE — 99214 OFFICE O/P EST MOD 30 MIN: CPT | Mod: GC | Performed by: INTERNAL MEDICINE

## 2017-05-01 RX ORDER — NYSTATIN 100000 U/G
CREAM TOPICAL DAILY PRN
Status: ON HOLD | COMMUNITY
End: 2018-10-15

## 2017-05-01 NOTE — LETTER
5/1/2017    RE: Tad Manning  [address]   [facility] [address]       Dear Colleague,    Thank you for the opportunity to participate in the care of your patient, Tad Manning, at the [facility]. Please see a copy of my visit note below.            Chief Complaint: Evaluation of HFrEF/ICM    HPI:   Mr. Tad Manning is a 76-year old male with a past medical history of CAD c/b ICM, atrial flutter, and recent admissions for discitis and decompensated ICM/HFrEF who presented for follow-up after his multiple recent hospitalizations.    Mr. Manning was [facility] 01/2017 for evaluation of orthostatic hypotension. Here he had a TTE showing EF of 45-50%. He was then discharged and later readmitted, where he was eventually diagnosed with diskitis and paraspinal abscess in 02/2017. This necessitated surgical deompression in 02/2017 and then he received a lengthy course of IV antibiotics, both as an inpatient and then later at a TCU. The diskitis was attributed to Strep. Mitis. During this admission, TTE was repeated and he was noted to have a depressed EF to 25-30%.     After discharge to the TCU in 03/2017, Mr. Manning was admitted to [facility] in April 2017 for decompensated HFrEF. The latter admission was to [facility] 1 and necessitated two days of diuresis after which he was discharged back to the TCU.     Since discharge, Mr. Manning notes active participation in PT. He is still largely limited to the wheelchair for mobility but he has made some progress with ambulation. Mr. Manning has occasional dyspnea with ambulation but no exertional chest pain, palpitations, pre-syncope or syncope. He estimates that he could walk a few steps by himself, but is limited by poor strength in his legs and deconditioning rather than dyspnea.     At the time of the consultation, Mr. Manning was without complaints. He is on a good  medical regimen.     ROS otherwise negative    Current Outpatient Prescriptions   Medication Sig Dispense Refill     acetaminophen (TYLENOL) 325 MG tablet Take 2 tablets (650 mg) by mouth every 4 hours as needed for other (surgical pain) 100 tablet 0     aspirin 325 MG tablet Take 1 tablet (325 mg) by mouth daily 120 tablet 0     atorvastatin (LIPITOR) 40 MG tablet Take 1 tablet (40 mg) by mouth daily 30 tablet 1     clopidogrel (PLAVIX) 75 MG tablet Take 1 tablet (75 mg) by mouth daily 30 tablet      insulin glargine (LANTUS) 100 UNIT/ML injection Inject 22 Units Subcutaneous every morning (before breakfast) 30 mL 0     lisinopril (PRINIVIL/ZESTRIL) 10 MG tablet Take 1 tablet (10 mg) by mouth daily 30 tablet      melatonin 3 MG tablet Take 1 tablet (3 mg) by mouth nightly as needed       metFORMIN (GLUCOPHAGE) 500 MG tablet Take 1 tablet (500 mg) by mouth 2 times daily (with meals) 60 tablet      metoprolol (LOPRESSOR) 25 MG tablet Take 3 tablets (75 mg) by mouth 2 times daily 60 tablet      mirtazapine (REMERON) 15 MG tablet Take 1 tablet (15 mg) by mouth At Bedtime 60 tablet      polyethylene glycol (MIRALAX/GLYCOLAX) Packet Take 17 g by mouth daily as needed for constipation 7 packet 0     ranitidine (ZANTAC) 150 MG tablet Take 1 tablet (150 mg) by mouth 2 times daily 60 tablet      potassium chloride SA (K-DUR/KLOR-CON M) 20 MEQ CR tablet Take 1 tablet (20 mEq) by mouth daily 90 tablet      senna-docusate (SENOKOT-S;PERICOLACE) 8.6-50 MG per tablet Take 1-2 tablets by mouth 2 times daily as needed (constipation ) 100 tablet 0     Ascorbic Acid (VITAMIN C) 500 MG CAPS Take 500 mg by mouth daily 30 capsule      benzonatate (TESSALON) 100 MG capsule Take 1 capsule (100 mg) by mouth 3 times daily as needed for cough 42 capsule      cholecalciferol 1000 UNITS TABS Take 1,000 Units by mouth daily 30 tablet      Skin Protectants, Misc. (CALAZIME SKIN PROTECTANT) PSTE Apply 1 Application topically daily 1 Tube       tamsulosin (FLOMAX) 0.4 MG capsule Take 2 capsules (0.8 mg) by mouth At Bedtime 60 capsule      nitroglycerin (NITROSTAT) 0.4 MG sublingual tablet For chest pain place 1 tablet under the tongue every 5 minutes for 3 doses. If symptoms persist 5 minutes after 1st dose call 911. 25 tablet      order for DME Equipment being ordered: DH2 shoe 1 Device 0       Past Medical History:   Diagnosis Date     Arthritis      ASCVD (arteriosclerotic cardiovascular disease)      BMI 30.0-30.9,adult      BPH (benign prostatic hypertrophy)      Cellulitis      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)      Coronary artery disease     triple bypass 1995     Diabetes mellitus (H)      Diabetic polyneuropathy (H)      History of blood transfusion      Hyperlipidaemia      Hypertension      Malignant neoplasm (H)     CLL     Noninflammatory pericardial effusion      Osteomyelitis of left foot (H)      PVD (peripheral vascular disease) (H)      Sebaceous cyst        Past Surgical History:   Procedure Laterality Date     AMPUTATE TOE(S)  1/10/2014    Procedure: AMPUTATE TOE(S);;  Surgeon: Amador Vick MD;  Location:  OR     BONE MARROW BIOPSY, BONE SPECIMEN, NEEDLE/TROCAR  10/19/2012    Procedure: BIOPSY BONE MARROW;  BONE MARROW BIOPSY  (CONSCIOUS SED);  Surgeon: Ramon Quesada MD;  Location:  GI     BYPASS GRAFT FEMOROPOPLITEAL  1/10/2014    Procedure: BYPASS GRAFT FEMOROPOPLITEAL;  Left above knee popliteal to  Below knee popliteal bypass using transverse saphenous vein graft. Left 2nd Toe amputation;  Surgeon: Amador Vick MD;  Location:  OR     CARDIAC SURGERY      angioplasty with stent, triple bypass     EXCISE CYST GENERIC (LOCATION) N/A 2/1/2016    Procedure: EXCISE CYST GENERIC (LOCATION);  Surgeon: Amador Vick MD;  Location:  [facility]     GRAFT VEIN FROM EXTREMITY (LOCATION)  1/10/2014    Procedure: GRAFT VEIN FROM EXTREMITY (LOCATION);;  Surgeon: Amador Vick  MD Esteban;  Location: [facility] OR     LAMINECTOMY THORACIC ONE LEVEL N/A 2/8/2017    Procedure: LAMINECTOMY THORACIC ONE LEVEL;  Surgeon: Marcelo Trent MD;  Location: [facility] OR     OPTICAL TRACKING SYSTEM FUSION POSTERIOR SPINE THORACIC THREE+ LEVELS N/A 2/12/2017    Procedure: OPTICAL TRACKING SYSTEM FUSION POSTERIOR SPINE THORACIC THREE+ LEVELS;  Surgeon: Marcelo Trent MD;  Location: [facility] OR     TONSILLECTOMY       VASCULAR SURGERY      ptcr legs       Family History   Problem Relation Age of Onset     CANCER Mother      leukemia       Social History   Substance Use Topics     Smoking status: Former Smoker     Packs/day: 2.00     Years: 30.00     Types: Cigarettes     Quit date: 6/19/1995     Smokeless tobacco: Never Used     Alcohol use Yes      Comment: 1 drink monthly       Allergies   Allergen Reactions     Blood Transfusion Related (Informational Only) Other (See Comments)     Patient has a history of a clinically significant antibody against RBC antigens.  A delay in compatible RBCs may occur.          ROS:   CONSTITUTIONAL:No report of fever, chills,  or change in weight  RESPIRATORY: No cough, wheezing, SOB, or hemoptysis  CARDIOVASCULAR: see HPI  MUSCULO-SKELETAL: No joint pain/swelling, no muslce pain  NEURO: No paresthesias, syncope, pre-syncope, light headness, dizziness or vertigo  ENDOCRINE: No temperature intolerance, no skin/hair changes  PSYCHIATRIC: No change in mood, feeling down/anxious, no change in sleep or appetite  GI: no melena or hematochezia, no change in bowel habits  : no hematuria or dysurea, no hesitancy, dribbling or incontinence  HEME: no easy bruising or bleeding, no history of anemia, no history of blood clots  SKIN: no rashes or sores, no unusual itching    Physical Examination:  Vitals: BP 92/53  Pulse 56  Wt 83.9 kg (185 lb)  SpO2 98%  BMI 25.09 kg/m2  BMI= Body mass index is 25.09 kg/(m^2).    GENERAL APPEARANCE: Healthy, alert and no distress. In wheelchair.  HEENT: no  icterus, no xanthelasmas, normal palate, mucosa moist, no cyanosis.  NECK: no adenopathy, no asymmetry, masses, or scars, thyroid normal to palpation, JVP is not visible  CHEST: lungs clear to auscultation - no rales, rhonchi or wheezes, no use of accessory muscles, no retractions, respirations are unlabored, normal respiratory rate, no kyphosis, no scoliosis  CARDIOVASCULAR: regular rhythm, normal S1 with physiologic split S2, no S3 or S4 and no murmur, click or rub, precordium quiet with normal PMI.  ABDOMEN: soft, non tender, without hepatosplenomegaly, no masses palpable, bowel sounds normal  EXTREMITIES: no clubbing, cyanosis or edema  NEURO: alert and oriented to person/place/time, normal speech, and affect  SKIN: no ecchymoses, no rashes    Laboratory:  Recent Labs   Lab Test  04/13/17   1214   04/06/17   0537   HGB  10.3*   < >  9.8*   HCT  35.0*   < >  33.6*   WBC  8.3   < >  6.1   MCV  91   < >  91   MCH  26.7   < >  26.4*   MCHC  29.4*   < >  29.2*   RDW  18.3*   < >  18.0*   PLT  220   < >  171   NA  142   < >  144   POTASSIUM  4.0   < >  3.6   CHLORIDE  104   < >  108   CO2  28   < >  25   BUN  11   < >  10   CR  0.67   < >  0.63*   GLC  113*   < >  120*   JUSTINO  8.5   < >  8.0*   ALBUMIN   --    --   2.4*   BILITOTAL   --    --   0.2   ALKPHOS   --    --   154*   AST   --    --   10   ALT   --    --   17    < > = values in this interval not displayed.     EKG 04/06/17:   I have personally reviewed the EKG. Rate approx 125 bpm, NSR, LAD, no ischemic changes.     Assessment and recommendations:  Mr. Tad Manning is a 76-year old male with a past medical history of CAD c/b ICM/HFrEF, AFib and recent hospitalizations for osteomyelitis and decompensated HFrEF who presented for follow-up of his HFrEF and other cardiac issues.     From a heart failure standpoint, Mr. Manning appears mildly hypervolemic. His situation does warrant an ischemic work-up but his overall candidacy for dual antiplatelet therapy  and current treatment for osteomyelitis makes him a poor candidate for risk stratification. Hence we will treat Mr. Manning medically for now.     - HFrEF, stage C, NYHA 3   - Start furosemide 40 mg q24h   - Continue current regimen of , atorvastatin 40 mg q24h, lisinopril 5 mg q24h, metoprolol 75 mg BID    - CAD   - Continue ASA, atorvastatin, lisinopril, metoprolol    - Atrial flutter   - Continue , metoprolol    RTC 3 mos with Dr. Valdes with [facility] in the interim (two weeks)    I appreciate the chance to help with Mr. Manning's care. Please let me know if you have any questions or concerns.    Seen and staffed with Dr. Valdes.    Francisco Alvarado  Cardiology Fellow     ATTENDING ATTESTATION:  This patient has been seen and examined by me May 1, 2017 with Dr. Alvarado. I have reviewed the vitals, laboratory and imaging data relevant to this patient's care. I have edited this note to reflect our joint assessment and plan, and discussed the plan with the patient.    Clarisse Valdes MD, MS  Staff Cardiologist  Pager: [phone]

## 2017-05-01 NOTE — NURSING NOTE
Chief Complaint   Patient presents with     RECHECK     manage coronary artery disease and systolic heart failure       Note the new medications: Lasix 40 mg by mouth every morning.  If weight is up > 2.5 pounds in one day administer another 40 mg of Lasix po in the afternoon.  Stop the following medications: none  Daily weights    The results from today include: none  Please follow up with Dr. Valdes in three months  [facility] clinic in two weeks

## 2017-05-01 NOTE — PROGRESS NOTES
Chief Complaint: Evaluation of HFrEF/ICM    HPI:   Mr. Tad Manning is a 76-year old male with a past medical history of CAD c/b ICM, atrial flutter, and recent admissions for discitis and decompensated ICM/HFrEF who presented for follow-up after his multiple recent hospitalizations.    Mr. Manning was originally admitted to [facility] In 01/2017 for evaluation of orthostatic hypotension. Here he had a TTE showing EF of 45-50%. He was then discharged and later readmitted, where he was eventually diagnosed with diskitis and paraspinal abscess in 02/2017. This necessitated surgical deompression in 02/2017 and then he received a lengthy course of IV antibiotics, both as an inpatient and then later at a TCU. The diskitis was attributed to Strep. Mitis. During this admission, TTE was repeated and he was noted to have a depressed EF to 25-30%.     After discharge to the TCU in 03/2017, Mr. Manning was admitted to [facility] in April 2017 for decompensated HFrEF. The latter admission was to [place] [address] and necessitated two days of diuresis after which he was discharged back to the TCU.     Since discharge, Mr. Manning notes active participation in PT. He is still largely limited to the wheelchair for mobility but he has made some progress with ambulation. Mr. Manning has occasional dyspnea with ambulation but no exertional chest pain, palpitations, pre-syncope or syncope. He estimates that he could walk a few steps by himself, but is limited by poor strength in his legs and deconditioning rather than dyspnea.     At the time of the consultation, Mr. Manning was without complaints. He is on a good medical regimen.     ROS otherwise negative    Current Outpatient Prescriptions   Medication Sig Dispense Refill     acetaminophen (TYLENOL) 325 MG tablet Take 2 tablets (650 mg) by mouth every 4 hours as needed for other (surgical pain) 100 tablet 0     aspirin 325 MG tablet Take 1 tablet (325 mg) by mouth daily 120 tablet 0      atorvastatin (LIPITOR) 40 MG tablet Take 1 tablet (40 mg) by mouth daily 30 tablet 1     clopidogrel (PLAVIX) 75 MG tablet Take 1 tablet (75 mg) by mouth daily 30 tablet      insulin glargine (LANTUS) 100 UNIT/ML injection Inject 22 Units Subcutaneous every morning (before breakfast) 30 mL 0     lisinopril (PRINIVIL/ZESTRIL) 10 MG tablet Take 1 tablet (10 mg) by mouth daily 30 tablet      melatonin 3 MG tablet Take 1 tablet (3 mg) by mouth nightly as needed       metFORMIN (GLUCOPHAGE) 500 MG tablet Take 1 tablet (500 mg) by mouth 2 times daily (with meals) 60 tablet      metoprolol (LOPRESSOR) 25 MG tablet Take 3 tablets (75 mg) by mouth 2 times daily 60 tablet      mirtazapine (REMERON) 15 MG tablet Take 1 tablet (15 mg) by mouth At Bedtime 60 tablet      polyethylene glycol (MIRALAX/GLYCOLAX) Packet Take 17 g by mouth daily as needed for constipation 7 packet 0     ranitidine (ZANTAC) 150 MG tablet Take 1 tablet (150 mg) by mouth 2 times daily 60 tablet      potassium chloride SA (K-DUR/KLOR-CON M) 20 MEQ CR tablet Take 1 tablet (20 mEq) by mouth daily 90 tablet      senna-docusate (SENOKOT-S;PERICOLACE) 8.6-50 MG per tablet Take 1-2 tablets by mouth 2 times daily as needed (constipation ) 100 tablet 0     Ascorbic Acid (VITAMIN C) 500 MG CAPS Take 500 mg by mouth daily 30 capsule      benzonatate (TESSALON) 100 MG capsule Take 1 capsule (100 mg) by mouth 3 times daily as needed for cough 42 capsule      cholecalciferol 1000 UNITS TABS Take 1,000 Units by mouth daily 30 tablet      Skin Protectants, Misc. (CALAZIME SKIN PROTECTANT) PSTE Apply 1 Application topically daily 1 Tube      tamsulosin (FLOMAX) 0.4 MG capsule Take 2 capsules (0.8 mg) by mouth At Bedtime 60 capsule      nitroglycerin (NITROSTAT) 0.4 MG sublingual tablet For chest pain place 1 tablet under the tongue every 5 minutes for 3 doses. If symptoms persist 5 minutes after 1st dose call 911. 25 tablet      order for DME Equipment being ordered: [facility]  shoe 1 Device 0       Past Medical History:   Diagnosis Date     Arthritis      ASCVD (arteriosclerotic cardiovascular disease)      BMI 30.0-30.9,adult      BPH (benign prostatic hypertrophy)      Cellulitis      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)      Coronary artery disease     triple bypass 1995     Diabetes mellitus (H)      Diabetic polyneuropathy (H)      History of blood transfusion      Hyperlipidaemia      Hypertension      Malignant neoplasm (H)     CLL     Noninflammatory pericardial effusion      Osteomyelitis of left foot (H)      PVD (peripheral vascular disease) (H)      Sebaceous cyst        Past Surgical History:   Procedure Laterality Date     AMPUTATE TOE(S)  1/10/2014    Procedure: AMPUTATE TOE(S);;  Surgeon: Amador Vick MD;  Location:  OR     BONE MARROW BIOPSY, BONE SPECIMEN, NEEDLE/TROCAR  10/19/2012    Procedure: BIOPSY BONE MARROW;  BONE MARROW BIOPSY  (CONSCIOUS SED);  Surgeon: Ramon Quesada MD;  Location:  GI     BYPASS GRAFT FEMOROPOPLITEAL  1/10/2014    Procedure: BYPASS GRAFT FEMOROPOPLITEAL;  Left above knee popliteal to  Below knee popliteal bypass using transverse saphenous vein graft. Left 2nd Toe amputation;  Surgeon: Amador Vick MD;  Location:  OR     CARDIAC SURGERY      angioplasty with stent, triple bypass     EXCISE CYST GENERIC (LOCATION) N/A 2/1/2016    Procedure: EXCISE CYST GENERIC (LOCATION);  Surgeon: Amador Vick MD;  Location:  SD     GRAFT VEIN FROM EXTREMITY (LOCATION)  1/10/2014    Procedure: GRAFT VEIN FROM EXTREMITY (LOCATION);;  Surgeon: Amador Vick MD;  Location:  OR     LAMINECTOMY THORACIC ONE LEVEL N/A 2/8/2017    Procedure: LAMINECTOMY THORACIC ONE LEVEL;  Surgeon: Marcelo Trent MD;  Location: [facility] OR     OPTICAL TRACKING SYSTEM FUSION POSTERIOR SPINE THORACIC THREE+ LEVELS N/A 2/12/2017    Procedure: OPTICAL TRACKING SYSTEM FUSION POSTERIOR SPINE THORACIC THREE+  LEVELS;  Surgeon: Marcelo Trent MD;  Location: [facility] OR     TONSILLECTOMY       VASCULAR SURGERY      ptcr legs       Family History   Problem Relation Age of Onset     CANCER Mother      leukemia       Social History   Substance Use Topics     Smoking status: Former Smoker     Packs/day: 2.00     Years: 30.00     Types: Cigarettes     Quit date: 6/19/1995     Smokeless tobacco: Never Used     Alcohol use Yes      Comment: 1 drink monthly       Allergies   Allergen Reactions     Blood Transfusion Related (Informational Only) Other (See Comments)     Patient has a history of a clinically significant antibody against RBC antigens.  A delay in compatible RBCs may occur.          ROS:   CONSTITUTIONAL:No report of fever, chills,  or change in weight  RESPIRATORY: No cough, wheezing, SOB, or hemoptysis  CARDIOVASCULAR: see HPI  MUSCULO-SKELETAL: No joint pain/swelling, no muslce pain  NEURO: No paresthesias, syncope, pre-syncope, light headness, dizziness or vertigo  ENDOCRINE: No temperature intolerance, no skin/hair changes  PSYCHIATRIC: No change in mood, feeling down/anxious, no change in sleep or appetite  GI: no melena or hematochezia, no change in bowel habits  : no hematuria or dysurea, no hesitancy, dribbling or incontinence  HEME: no easy bruising or bleeding, no history of anemia, no history of blood clots  SKIN: no rashes or sores, no unusual itching    Physical Examination:  Vitals: BP 92/53  Pulse 56  Wt 83.9 kg (185 lb)  SpO2 98%  BMI 25.09 kg/m2  BMI= Body mass index is 25.09 kg/(m^2).    GENERAL APPEARANCE: Healthy, alert and no distress. In wheelchair.  HEENT: no icterus, no xanthelasmas, normal palate, mucosa moist, no cyanosis.  NECK: no adenopathy, no asymmetry, masses, or scars, thyroid normal to palpation, JVP is not visible  CHEST: lungs clear to auscultation - no rales, rhonchi or wheezes, no use of accessory muscles, no retractions, respirations are unlabored, normal respiratory rate,  no kyphosis, no scoliosis  CARDIOVASCULAR: regular rhythm, normal S1 with physiologic split S2, no S3 or S4 and no murmur, click or rub, precordium quiet with normal PMI.  ABDOMEN: soft, non tender, without hepatosplenomegaly, no masses palpable, bowel sounds normal  EXTREMITIES: no clubbing, cyanosis or edema  NEURO: alert and oriented to person/place/time, normal speech, and affect  SKIN: no ecchymoses, no rashes    Laboratory:  Recent Labs   Lab Test  04/13/17   1214   04/06/17   0537   HGB  10.3*   < >  9.8*   HCT  35.0*   < >  33.6*   WBC  8.3   < >  6.1   MCV  91   < >  91   MCH  26.7   < >  26.4*   MCHC  29.4*   < >  29.2*   RDW  18.3*   < >  18.0*   PLT  220   < >  171   NA  142   < >  144   POTASSIUM  4.0   < >  3.6   CHLORIDE  104   < >  108   CO2  28   < >  25   BUN  11   < >  10   CR  0.67   < >  0.63*   GLC  113*   < >  120*   JUSTINO  8.5   < >  8.0*   ALBUMIN   --    --   2.4*   BILITOTAL   --    --   0.2   ALKPHOS   --    --   154*   AST   --    --   10   ALT   --    --   17    < > = values in this interval not displayed.     EKG 04/06/17:   I have personally reviewed the EKG. Rate approx 125 bpm, NSR, LAD, no ischemic changes.     Assessment and recommendations:  Mr. Tad Manning is a 76-year old male with a past medical history of CAD c/b ICM/HFrEF, AFib and recent hospitalizations for osteomyelitis and decompensated HFrEF who presented for follow-up of his HFrEF and other cardiac issues.     From a heart failure standpoint, Mr. Manning appears mildly hypervolemic. His situation does warrant an ischemic work-up but his overall candidacy for dual antiplatelet therapy and current treatment for osteomyelitis makes him a poor candidate for risk stratification. Hence we will treat Mr. Manning medically for now.     - HFrEF, stage C, NYHA 3   - Start furosemide 40 mg q24h   - Continue current regimen of , atorvastatin 40 mg q24h, lisinopril 5 mg q24h, metoprolol 75 mg BID    - CAD   - Continue  ASA, atorvastatin, lisinopril, metoprolol    - Atrial flutter   - Continue , metoprolol    RTC 3 mos with Dr. Valdes with [facility] in the interim (two weeks)    I appreciate the chance to help with Mr. Manning's care. Please let me know if you have any questions or concerns.    Seen and staffed with Dr. Valdes.    Francisco Alvarado  Cardiology Fellow     ATTENDING ATTESTATION:  This patient has been seen and examined by me May 1, 2017 with Dr. Alvarado. I have reviewed the vitals, laboratory and imaging data relevant to this patient's care. I have edited this note to reflect our joint assessment and plan, and discussed the plan with the patient.    Clarisse Valdes MD, MS  Staff Cardiologist  Pager: [phone]

## 2017-05-01 NOTE — MR AVS SNAPSHOT
After Visit Summary   5/1/2017    Tad Manning    MRN: [id number]           Patient Information     Date Of Birth          1940        Visit Information        Provider Department      5/1/2017 11:00 AM Clarisse Valdes MD [facility] Instructions    Patient Instructions:  It was a pleasure to see you in the cardiology clinic today.      If you have any questions, you can reach my nurse, Jeaneth Montero, at [phone].  Press Option #1 for the [facility], and then press Option #3 for nursing.  We are encouraging the use of MyChart to communicate with your HealthCare Provider    Note the new medications: Lasix 40 mg by mouth every morning.  If weight is up > 2.5 pounds in one day administer another 40 mg of Lasix po in the afternoon.  Stop the following medications: none  Daily weights    The results from today include: none  Please follow up with Dr. Valdes in three months  [facility] clinic on the 15th of May    Sincerely,    Clarisse Valdes MD     If you have an urgent need after hours (8:00 am to 4:30 pm) please call [phone] and ask for the cardiology fellow on call.                  Follow-ups after your visit        Follow-up notes from your care team     Return in about 3 months (around 8/1/2017), or if symptoms worsen or fail to improve.      Your next 10 appointments already scheduled     May 11, 2017 12:30 PM CDT   Lab with  LAB    [facility] ([facility])    [address] [place] [address]  1st [facility] [address] [phone]            May 11, 2017  1:00 PM CDT   (Arrive by 12:45 PM)   [facility] NEW with [facility] ([facility])    [address] [place] [address]  3rd Floor  [facility] [address] [phone]            May 15, 2017  3:00 PM CDT   XR SPINE COMPLETE 2 VIEWS with XR1   [facility] ([facility])     [address] [place] [id number] [phone]           Please bring a list of your current medicines to your exam. (Include vitamins, minerals and over-thecounter medicines.) Leave your valuables at home.  Tell your doctor if there is a chance you may be pregnant.  You do not need to do anything special for this exam.            May 15, 2017  4:00 PM CDT   (Arrive by 3:45 PM)   Return Visit with Marge Richards PA-C   [facility] ([facility])    [address] [place] [address]   [phone]            May 25, 2017 10:30 AM CDT   (Arrive by 10:15 AM)   Return Visit with Gillian Max MD   [facility] ([facility])    [address] [place] [id number] [phone]            Aug 07, 2017  1:00 PM CDT   (Arrive by 12:45 PM)   Return Visit with Clarisse Valdes MD   [facility] ([facility])    [address] [place] [address] [phone]              Who to contact     If you have questions or need follow up information about today's clinic visit or your schedule please contact [facility] directly at [phone].  Normal or non-critical lab and imaging results will be communicated to you by [employer], letter or phone within 4 business days after the clinic has received the results. If you do not hear from us within 7 days, please contact the clinic through [employer] or phone. If you have a critical or abnormal lab result, we will notify you by phone as soon as possible.  Submit refill requests through [employer] or call your pharmacy and they will forward the refill request to us. Please allow 3 business days for your refill to be completed.          Additional Information About Your Visit        [employer] Information     [employer] lets you send messages to your doctor, view your  "test results, renew your prescriptions, schedule appointments and more. To sign up, go to [email or web address] . Click on \"Log in\" on the left side of the screen, which will take you to the Welcome page. Then click on \"Sign up Now\" on the right side of the page.     You will be asked to enter the access code listed below, as well as some personal information. Please follow the directions to create your username and password.     Your access code is: [email or web address]  Expires: 2017 12:30 PM     Your access code will  in 90 days. If you need help or a new code, please call your [place] clinic or [phone].        Care EveryWhere ID     This is your Care EveryWhere ID. This could be used by other organizations to access your [place] medical records  [id number]        Your Vitals Were     Pulse Pulse Oximetry BMI (Body Mass Index)             56 98% 25.09 kg/m2          Blood Pressure from Last 3 Encounters:   17 92/53   17 91/57   17 104/65    Weight from Last 3 Encounters:   17 83.9 kg (185 lb)   17 86.2 kg (190 lb)   17 86.2 kg (190 lb)              Today, you had the following     No orders found for display         Today's Medication Changes          These changes are accurate as of: 17 12:30 PM.  If you have any questions, ask your nurse or doctor.               These medicines have changed or have updated prescriptions.        Dose/Directions    lisinopril 10 MG tablet   Commonly known as:  PRINIVIL/ZESTRIL   This may have changed:  how much to take   Used for:  Essential hypertension        Dose:  10 mg   Take 1 tablet (10 mg) by mouth daily   Quantity:  30 tablet   Refills:  0                Primary Care Provider Office Phone # Fax #    Gene Guevara [phone]       [address] [address]        Thank you!     Thank you for choosing [facility]  for your care. Our goal is always to " provide you with excellent care. Hearing back from our patients is one way we can continue to improve our services. Please take a few minutes to complete the written survey that you may receive in the mail after your visit with us. Thank you!             Your Updated Medication List - Protect others around you: Learn how to safely use, store and throw away your medicines at www.disposemymeds.org.          This list is accurate as of: 5/1/17 12:30 PM.  Always use your most recent med list.                   Brand Name Dispense Instructions for use    acetaminophen 325 MG tablet    TYLENOL    100 tablet    Take 2 tablets (650 mg) by mouth every 4 hours as needed for other (surgical pain)       aspirin 325 MG tablet     120 tablet    Take 1 tablet (325 mg) by mouth daily       atorvastatin 40 MG tablet    LIPITOR    30 tablet    Take 1 tablet (40 mg) by mouth daily       benzonatate 100 MG capsule    TESSALON    42 capsule    Take 1 capsule (100 mg) by mouth 3 times daily as needed for cough       CALAZIME SKIN PROTECTANT Pste     1 Tube    Apply 1 Application topically daily       cholecalciferol 1000 UNITS Tabs     30 tablet    Take 1,000 Units by mouth daily       clopidogrel 75 MG tablet    PLAVIX    30 tablet    Take 1 tablet (75 mg) by mouth daily       insulin glargine 100 UNIT/ML injection    LANTUS    30 mL    Inject 22 Units Subcutaneous every morning (before breakfast)       lisinopril 10 MG tablet    PRINIVIL/ZESTRIL    30 tablet    Take 1 tablet (10 mg) by mouth daily       melatonin 3 MG tablet      Take 1 tablet (3 mg) by mouth nightly as needed       metFORMIN 500 MG tablet    GLUCOPHAGE    60 tablet    Take 1 tablet (500 mg) by mouth 2 times daily (with meals)       metoprolol 25 MG tablet    LOPRESSOR    60 tablet    Take 3 tablets (75 mg) by mouth 2 times daily       mirtazapine 15 MG tablet    REMERON    60 tablet    Take 1 tablet (15 mg) by mouth At Bedtime       nitroglycerin 0.4 MG sublingual  tablet    NITROSTAT    25 tablet    For chest pain place 1 tablet under the tongue every 5 minutes for 3 doses. If symptoms persist 5 minutes after 1st dose call 911.       nystatin cream    MYCOSTATIN     Apply topically 2 times daily       order for DME     1 Device    Equipment being ordered: DH2 shoe       polyethylene glycol Packet    MIRALAX/GLYCOLAX    7 packet    Take 17 g by mouth daily as needed for constipation       potassium chloride SA 20 MEQ CR tablet    K-DUR/KLOR-CON M    90 tablet    Take 1 tablet (20 mEq) by mouth daily       ranitidine 150 MG tablet    ZANTAC    60 tablet    Take 1 tablet (150 mg) by mouth 2 times daily       senna-docusate 8.6-50 MG per tablet    SENOKOT-S;PERICOLACE    100 tablet    Take 1-2 tablets by mouth 2 times daily as needed (constipation )       tamsulosin 0.4 MG capsule    FLOMAX    60 capsule    Take 2 capsules (0.8 mg) by mouth At Bedtime

## 2017-05-01 NOTE — NURSING NOTE
Chief Complaint   Patient presents with     RECHECK     manage coronary artery disease and systolic heart failure

## 2017-05-01 NOTE — PATIENT INSTRUCTIONS
Patient Instructions:  It was a pleasure to see you in the cardiology clinic today.      If you have any questions, you can reach my nurse, Jeaneth Montero, at [phone].  Press Option #1 for the [facility], and then press Option #3 for nursing.  We are encouraging the use of [employer] to communicate with your HealthCare Provider    Note the new medications: Lasix 40 mg by mouth every morning.  If weight is up > 2.5 pounds in one day administer another 40 mg of Lasix po in the afternoon.  Stop the following medications: none  Daily weights    The results from today include: none  Please follow up with Dr. Valdes in three months  [facility] clinic in two weeks    Sincerely,    Clarisse Valdes MD     If you have an urgent need after hours (8:00 am to 4:30 pm) please call [phone] and ask for the cardiology fellow on call.

## 2017-05-07 ENCOUNTER — PRE VISIT (OUTPATIENT)
Dept: CARDIOLOGY | Facility: CLINIC | Age: 77
End: 2017-05-07

## 2017-05-07 DIAGNOSIS — I50.22 CHRONIC SYSTOLIC HEART FAILURE (H): Primary | ICD-10-CM

## 2017-05-11 ENCOUNTER — OFFICE VISIT (OUTPATIENT)
Dept: CARDIOLOGY | Facility: CLINIC | Age: 77
End: 2017-05-11
Attending: INTERNAL MEDICINE
Payer: MEDICARE

## 2017-05-11 VITALS
SYSTOLIC BLOOD PRESSURE: 110 MMHG | HEIGHT: 72 IN | WEIGHT: 187.4 LBS | DIASTOLIC BLOOD PRESSURE: 69 MMHG | OXYGEN SATURATION: 98 % | HEART RATE: 80 BPM | BODY MASS INDEX: 25.38 KG/M2

## 2017-05-11 DIAGNOSIS — I50.22 CHRONIC SYSTOLIC HEART FAILURE (H): ICD-10-CM

## 2017-05-11 DIAGNOSIS — I50.22 CHRONIC SYSTOLIC HEART FAILURE (H): Primary | ICD-10-CM

## 2017-05-11 LAB
ANION GAP SERPL CALCULATED.3IONS-SCNC: 7 MMOL/L (ref 3–14)
BUN SERPL-MCNC: 14 MG/DL (ref 7–30)
CALCIUM SERPL-MCNC: 8.8 MG/DL (ref 8.5–10.1)
CHLORIDE SERPL-SCNC: 102 MMOL/L (ref 94–109)
CO2 SERPL-SCNC: 28 MMOL/L (ref 20–32)
CREAT SERPL-MCNC: 0.65 MG/DL (ref 0.66–1.25)
GFR SERPL CREATININE-BSD FRML MDRD: ABNORMAL ML/MIN/1.7M2
GLUCOSE SERPL-MCNC: 78 MG/DL (ref 70–99)
NT-PROBNP SERPL-MCNC: ABNORMAL PG/ML (ref 0–125)
POTASSIUM SERPL-SCNC: 4.5 MMOL/L (ref 3.4–5.3)
SODIUM SERPL-SCNC: 137 MMOL/L (ref 133–144)

## 2017-05-11 PROCEDURE — 99214 OFFICE O/P EST MOD 30 MIN: CPT | Mod: ZP | Performed by: NURSE PRACTITIONER

## 2017-05-11 PROCEDURE — 83880 ASSAY OF NATRIURETIC PEPTIDE: CPT | Performed by: NURSE PRACTITIONER

## 2017-05-11 PROCEDURE — 36415 COLL VENOUS BLD VENIPUNCTURE: CPT | Performed by: NURSE PRACTITIONER

## 2017-05-11 PROCEDURE — 99212 OFFICE O/P EST SF 10 MIN: CPT | Mod: ZF

## 2017-05-11 PROCEDURE — 80048 BASIC METABOLIC PNL TOTAL CA: CPT | Performed by: NURSE PRACTITIONER

## 2017-05-11 RX ORDER — FUROSEMIDE 40 MG
40 TABLET ORAL DAILY
COMMUNITY
End: 2017-07-06

## 2017-05-11 ASSESSMENT — PAIN SCALES - GENERAL: PAINLEVEL: NO PAIN (0)

## 2017-05-11 NOTE — MR AVS SNAPSHOT
After Visit Summary   2017    Tad Manning    MRN: [id number]           Patient Information     Date Of Birth          1940        Visit Information        Provider Department      2017 1:00 PM Magnolia Cifuentes APRN [facility]        Today's Diagnoses     Chronic systolic heart failure (H)    -  1      Care Instructions    You were seen today in the Cardiovascular Clinic at the [facility].     Cardiology Providers you saw during your visit: Magnolia Cifuentes NP       1. No change in medications and treatment   2. Please make a follow-up heart failure appt with [facility] in 4 weeks with labs prior.     Results for TAD MANNING (MRN [id number]) as of 2017 13:08   Ref. Range 2017 12:38   Sodium Latest Ref Range: 133 - 144 mmol/L 137   Potassium Latest Ref Range: 3.4 - 5.3 mmol/L 4.5   Chloride Latest Ref Range: 94 - 109 mmol/L 102   Carbon Dioxide Latest Ref Range: 20 - 32 mmol/L 28   Urea Nitrogen Latest Ref Range: 7 - 30 mg/dL 14   Creatinine Latest Ref Range: 0.66 - 1.25 mg/dL 0.65 (L)   GFR Estimate Latest Ref Range: >60 mL/min/1.7m2 >90...   GFR Estimate If Black Latest Ref Range: >60 mL/min/1.7m2 >90...   Calcium Latest Ref Range: 8.5 - 10.1 mg/dL 8.8   Anion Gap Latest Ref Range: 3 - 14 mmol/L 7   N-Terminal Pro Bnp Latest Ref Range: 0 - 125 pg/mL 39607 (H)   Glucose Latest Ref Range: 70 - 99 mg/dL 78       Please limit your fluid intake to 2 L (64 ounces) daily.  2 Liters a day = 8.5 cups, or 72 ounces.  Please limit your salt intake to 2 grams a day or less.    If you gain 2# in 24 hours or 5# in one week call Nicole Hollingsworth RN so we can adjust your medications as needed over the phone.    Please feel free to call me with any questions or concerns.      Nicole Hollingsworth RN BSN [facility]  [facility]  Cardiology Care Coordinator-Heart Failure Clinic    Questions and [phone].   First press #1 for  "[facility] and then press #3 for \"Medical Questions\" to reach us Cardiology Nurses.     On Call Cardiologist for after hours or on weekends: [phone]   option #4 and ask to speak to the on-call Cardiologist. Inform them you are a [facility]/heart failure patient at [facility].        If you need a medication refill please contact your pharmacy.  Please allow 3 business days for your refill to be completed.  _______________________________________________________  [facility] Cardiomyopathy, Optimization, Rehabilitation, Education   The [facility]INIC is a heart failure specialty clinic within the [facility] where you will work with specialized nurse practitioners dedicated to helping patients with heart failure carefully adjust medications, receive education, and learn who and when to call if symptoms develop. They specialize in helping you better understand your condition, slow the progression of your disease, improve the length and quality of your life, help you detect future heart problems before they become life threatening, and avoid hospitalizations.  As always, thank you for trusting us with your health care needs!             Follow-ups after your visit        Follow-up notes from your care team     Return in about 4 weeks (around 6/8/2017).      Your next 10 appointments already scheduled     May 15, 2017  3:00 PM CDT   XR SPINE COMPLETE 2 VIEWS with UCXR1   [facility] ([facility])    [address] [place] [id number] [phone]           Please bring a list of your current medicines to your exam. (Include vitamins, minerals and over-thecounter medicines.) Leave your valuables at home.  Tell your doctor if there is a chance you may be pregnant.  You do not need to do anything special for this exam.            May 15, 2017  4:00 PM CDT   (Arrive by 3:45 PM)   Return Visit with Marge FREDERICK" LINA Richards    [facility] ([facility])    [address] [place] [id number]   [phone]            May 25, 2017 10:30 AM CDT   (Arrive by 10:15 AM)   Return Visit with Gillian Max MD   [facility] ([facility])    [address] [place] [id number]   [phone]            Jun 08, 2017 11:30 AM CDT   Lab with  LAB   [facility] ([facility])    [address] [place] [id number]   [phone]            Jun 08, 2017 12:00 PM CDT   (Arrive by 11:45 AM)   CORE RETURN with OFELIA Faulkner [facility] ([facility])    [address] [place] [id number] [phone]            Aug 07, 2017  1:00 PM CDT   (Arrive by 12:45 PM)   Return Visit with Clarisse Valdes MD   [facility] ([facility])    [address] [place] [id number] [phone]              Who to contact     If you have questions or need follow up information about today's clinic visit or your schedule please contact [facility] directly at [phone].  Normal or non-critical lab and imaging results will be communicated to you by MyChart, letter or phone within 4 business days after the clinic has received the results. If you do not hear from us within 7 days, please contact the clinic through [employer] or phone. If you have a critical or abnormal lab result, we will notify you by phone as soon as possible.  Submit refill requests through [employer] or call your pharmacy and they will forward the refill request to us. Please allow 3 business days for your refill to be completed.          Additional Information About Your Visit        MyChart Information     [employer] lets you send messages to your doctor, view your test  "results, renew your prescriptions, schedule appointments and more. To sign up, go to [email or web address] . Click on \"Log in\" on the left side of the screen, which will take you to the Welcome page. Then click on \"Sign up Now\" on the right side of the page.     You will be asked to enter the access code listed below, as well as some personal information. Please follow the directions to create your username and password.     Your access code is: [email or web address]  Expires: 2017 12:30 PM     Your access code will  in 90 days. If you need help or a new code, please call your [place] clinic or [phone].        Care EveryWhere ID     This is your Care EveryWhere ID. This could be used by other organizations to access your [place] medical records  [id number]        Your Vitals Were     Pulse Height Pulse Oximetry BMI (Body Mass Index)          80 1.829 m (6') 98% 25.42 kg/m2         Blood Pressure from Last 3 Encounters:   17 110/69   17 92/53   17 91/57    Weight from Last 3 Encounters:   17 85 kg (187 lb 6.4 oz)   17 83.9 kg (185 lb)   17 86.2 kg (190 lb)              Today, you had the following     No orders found for display         Today's Medication Changes          These changes are accurate as of: 17  2:13 PM.  If you have any questions, ask your nurse or doctor.               These medicines have changed or have updated prescriptions.        Dose/Directions    lisinopril 10 MG tablet   Commonly known as:  PRINIVIL/ZESTRIL   This may have changed:  how much to take   Used for:  Essential hypertension        Dose:  10 mg   Take 1 tablet (10 mg) by mouth daily   Quantity:  30 tablet   Refills:  0                Primary Care Provider Office Phone # Fax #    Gene Guevara [phone]       [address] [address]        Thank you!     Thank you for choosing [facility]  for your care. Our goal " is always to provide you with excellent care. Hearing back from our patients is one way we can continue to improve our services. Please take a few minutes to complete the written survey that you may receive in the mail after your visit with us. Thank you!             Your Updated Medication List - Protect others around you: Learn how to safely use, store and throw away your medicines at www.disposemymeds.org.          This list is accurate as of: 5/11/17  2:13 PM.  Always use your most recent med list.                   Brand Name Dispense Instructions for use    acetaminophen 325 MG tablet    TYLENOL    100 tablet    Take 2 tablets (650 mg) by mouth every 4 hours as needed for other (surgical pain)       aspirin 325 MG tablet     120 tablet    Take 1 tablet (325 mg) by mouth daily       atorvastatin 40 MG tablet    LIPITOR    30 tablet    Take 1 tablet (40 mg) by mouth daily       benzonatate 100 MG capsule    TESSALON    42 capsule    Take 1 capsule (100 mg) by mouth 3 times daily as needed for cough       CALAZIME SKIN PROTECTANT Pste     1 Tube    Apply 1 Application topically daily       cholecalciferol 1000 UNITS Tabs     30 tablet    Take 1,000 Units by mouth daily       clopidogrel 75 MG tablet    PLAVIX    30 tablet    Take 1 tablet (75 mg) by mouth daily       insulin glargine 100 UNIT/ML injection    LANTUS    30 mL    Inject 22 Units Subcutaneous every morning (before breakfast)       LASIX 40 MG tablet   Generic drug:  furosemide      Take 40 mg by mouth daily If weight goes up 2.5 lbs, pt has to take another pill in the PM.       lisinopril 10 MG tablet    PRINIVIL/ZESTRIL    30 tablet    Take 1 tablet (10 mg) by mouth daily       melatonin 3 MG tablet      Take 1 tablet (3 mg) by mouth nightly as needed       metFORMIN 500 MG tablet    GLUCOPHAGE    60 tablet    Take 1 tablet (500 mg) by mouth 2 times daily (with meals)       metoprolol 25 MG tablet    LOPRESSOR    60 tablet    Take 3 tablets (75 mg)  by mouth 2 times daily       mirtazapine 15 MG tablet    REMERON    60 tablet    Take 1 tablet (15 mg) by mouth At Bedtime       nitroglycerin 0.4 MG sublingual tablet    NITROSTAT    25 tablet    For chest pain place 1 tablet under the tongue every 5 minutes for 3 doses. If symptoms persist 5 minutes after 1st dose call 911.       nystatin cream    MYCOSTATIN     Apply topically 2 times daily       order for DME     1 Device    Equipment being ordered: 2 shoe       polyethylene glycol Packet    MIRALAX/GLYCOLAX    7 packet    Take 17 g by mouth daily as needed for constipation       potassium chloride SA 20 MEQ CR tablet    K-DUR/KLOR-CON M    90 tablet    Take 1 tablet (20 mEq) by mouth daily       ranitidine 150 MG tablet    ZANTAC    60 tablet    Take 1 tablet (150 mg) by mouth 2 times daily       senna-docusate 8.6-50 MG per tablet    SENOKOT-S;PERICOLACE    100 tablet    Take 1-2 tablets by mouth 2 times daily as needed (constipation )       tamsulosin 0.4 MG capsule    FLOMAX    60 capsule    Take 2 capsules (0.8 mg) by mouth At Bedtime

## 2017-05-11 NOTE — PROGRESS NOTES
Chief Complaint:    Chief Complaint   Patient presents with     New Patient     [facility]; 76yr old male wih a h/o CAD, Aflutter and chronic systolic HF secondary to ICM presenting for HF follow-up with labs prior.         HPI: PAST MEDICAL HISTORY:HISTORY OF PRESENT ILLNESS:  Mr. Manning is a 74-year-old gentleman with a history of coronary artery disease, ischemic cardiomyopathy, HFpEF, hypertension, hyperlipidemia, atrial flutter, diabetes mellitus, anemia, status post left popliteal bypass 01/10/2017 with second toe amputation.  This was complicated by osteomyelitis and renal insufficiency.  He was admitted to [facility] 01/14 through 01/22 and underwent the left popliteal bypass.  He was readmitted to the hospital for an episode of diskitis in 02/2017 and had a paraspinal abscess, had a surgical compression and received IV antibiotic therapy.  KASSIE at that time showed an ejection fraction of 25-30%.  More recently, he was seen in our clinic by Dr. Raissa Jay on 05/01/2017.  He was started on furosemide 40 mg p.o. every day at that clinic appointment and asked to return to clinic today.  He returns to clinic today with his brother and his significant other for routine followup.      REVIEW OF SYSTEMS:  He states that he is feeling well.   He is unable to stand so we cannot weight him in our clinic today.  At the TCU they are weighing him in his wheelchair it does not appear that he is in the same clothes or voiding every time prior to his weight.  His weight on 05/01 was 185; today it is 187 pounds by TCU records.  He states that he is feeling well.  He feels he has had increased urination with the addition of the 40 mg of furosemide.  He denies any shortness of breath at rest.  He does get short of breath if he is trying to get into the car.  He does sleep with 2 pillows.  States that his sleeping is poor.  He wakes up every 4-5 hours.  He denies any chest pain, angina or use of  nitroglycerin, lightheaded, dizziness, falling, ankle edema, nausea or vomiting.  He continues to wear LUDIVINA hose for habit.  He is receiving OT and PT in the TCU on a daily basis.  He denies use of tobacco.  Denies use of alcohol.  TCU  is checking his blood glucose 3 times per day.       He was admitted to the hospital again 04/06-04/08 for an episode of acute on chronic congestive heart failure.         Past Medical History:   Diagnosis Date     Arthritis      ASCVD (arteriosclerotic cardiovascular disease)      BMI 30.0-30.9,adult      BPH (benign prostatic hypertrophy)      Cellulitis      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)      Coronary artery disease     triple bypass 1995     Diabetes mellitus (H)      Diabetic polyneuropathy (H)      History of blood transfusion      Hyperlipidaemia      Hypertension      Malignant neoplasm (H)     CLL     Noninflammatory pericardial effusion      Osteomyelitis of left foot (H)      PVD (peripheral vascular disease) (H)      Sebaceous cyst        PAST SURGICAL HISTORY  Past Surgical History:   Procedure Laterality Date     AMPUTATE TOE(S)  1/10/2014    Procedure: AMPUTATE TOE(S);;  Surgeon: Amador Vick MD;  Location:  OR     BONE MARROW BIOPSY, BONE SPECIMEN, NEEDLE/TROCAR  10/19/2012    Procedure: BIOPSY BONE MARROW;  BONE MARROW BIOPSY  (CONSCIOUS SED);  Surgeon: Ramon Quesada MD;  Location:  GI     BYPASS GRAFT FEMOROPOPLITEAL  1/10/2014    Procedure: BYPASS GRAFT FEMOROPOPLITEAL;  Left above knee popliteal to  Below knee popliteal bypass using transverse saphenous vein graft. Left 2nd Toe amputation;  Surgeon: Amador Vick MD;  Location:  OR     CARDIAC SURGERY      angioplasty with stent, triple bypass     EXCISE CYST GENERIC (LOCATION) N/A 2/1/2016    Procedure: EXCISE CYST GENERIC (LOCATION);  Surgeon: Amador Vick MD;  Location:  SD     GRAFT VEIN FROM EXTREMITY (LOCATION)  1/10/2014     Procedure: GRAFT VEIN FROM EXTREMITY (LOCATION);;  Surgeon: Amador Vick MD;  Location: [facility] OR     LAMINECTOMY THORACIC ONE LEVEL N/A 2/8/2017    Procedure: LAMINECTOMY THORACIC ONE LEVEL;  Surgeon: Marcelo Trent MD;  Location: [facility] OR     OPTICAL TRACKING SYSTEM FUSION POSTERIOR SPINE THORACIC THREE+ LEVELS N/A 2/12/2017    Procedure: OPTICAL TRACKING SYSTEM FUSION POSTERIOR SPINE THORACIC THREE+ LEVELS;  Surgeon: Marcelo Trent MD;  Location: [facility] OR     TONSILLECTOMY       VASCULAR SURGERY      ptcr legs         FAMILY HISTORY:  Family History   Problem Relation Age of Onset     CANCER Mother      leukemia         SOCIAL HISTORY:  Social History     Social History     Marital status: Single     Spouse name: N/A     Number of children: N/A     Years of education: N/A     Social History Main Topics     Smoking status: Former Smoker     Packs/day: 2.00     Years: 30.00     Types: Cigarettes     Quit date: 6/19/1995     Smokeless tobacco: Never Used     Alcohol use Yes      Comment: 1 drink monthly     Drug use: No     Sexual activity: Not Asked     Other Topics Concern     None     Social History Narrative    Lives independently with SO. 2/9/2017        ALLERGIES  Allergies   Allergen Reactions     Blood Transfusion Related (Informational Only) Other (See Comments)     Patient has a history of a clinically significant antibody against RBC antigens.  A delay in compatible RBCs may occur.          CURRENT MEDICATIONS:    Current Outpatient Prescriptions on File Prior to Visit:  nystatin (MYCOSTATIN) cream Apply topically 2 times daily   acetaminophen (TYLENOL) 325 MG tablet Take 2 tablets (650 mg) by mouth every 4 hours as needed for other (surgical pain)   aspirin 325 MG tablet Take 1 tablet (325 mg) by mouth daily   atorvastatin (LIPITOR) 40 MG tablet Take 1 tablet (40 mg) by mouth daily   clopidogrel (PLAVIX) 75 MG tablet Take 1 tablet (75 mg) by mouth daily   insulin glargine (LANTUS) 100 UNIT/ML  injection Inject 22 Units Subcutaneous every morning (before breakfast)   lisinopril (PRINIVIL/ZESTRIL) 10 MG tablet Take 1 tablet (10 mg) by mouth daily (Patient taking differently: Take 5 mg by mouth daily )   melatonin 3 MG tablet Take 1 tablet (3 mg) by mouth nightly as needed   metFORMIN (GLUCOPHAGE) 500 MG tablet Take 1 tablet (500 mg) by mouth 2 times daily (with meals)   metoprolol (LOPRESSOR) 25 MG tablet Take 3 tablets (75 mg) by mouth 2 times daily   mirtazapine (REMERON) 15 MG tablet Take 1 tablet (15 mg) by mouth At Bedtime   polyethylene glycol (MIRALAX/GLYCOLAX) Packet Take 17 g by mouth daily as needed for constipation   ranitidine (ZANTAC) 150 MG tablet Take 1 tablet (150 mg) by mouth 2 times daily   potassium chloride SA (K-DUR/KLOR-CON M) 20 MEQ CR tablet Take 1 tablet (20 mEq) by mouth daily   senna-docusate (SENOKOT-S;PERICOLACE) 8.6-50 MG per tablet Take 1-2 tablets by mouth 2 times daily as needed (constipation )   benzonatate (TESSALON) 100 MG capsule Take 1 capsule (100 mg) by mouth 3 times daily as needed for cough   cholecalciferol 1000 UNITS TABS Take 1,000 Units by mouth daily   Skin Protectants, Misc. (CALAZIME SKIN PROTECTANT) PSTE Apply 1 Application topically daily   tamsulosin (FLOMAX) 0.4 MG capsule Take 2 capsules (0.8 mg) by mouth At Bedtime   nitroglycerin (NITROSTAT) 0.4 MG sublingual tablet For chest pain place 1 tablet under the tongue every 5 minutes for 3 doses. If symptoms persist 5 minutes after 1st dose call 911.   order for DME Equipment being ordered: DH2 shoe     No current facility-administered medications on file prior to visit.       ROS:   Constitutional: No fever, chills, or sweats. No weight gain/loss.   ENT: No visual disturbance, ear ache, epistaxis, sore throat.   Allergies/Immunologic: Negative.   Respiratory: No cough, hemoptysis.   Cardiovascular: As per HPI.   GI: No nausea, vomiting, hematemesis, melena, or hematochezia.   : No urinary frequency,  dysuria, or hematuria.   Integument: Negative.   Psychiatric: Negative.   Neuro: Negative.   Endocrinology: Negative.   Musculoskeletal: Negative.    EXAM:  /69 (BP Location: Left arm, Patient Position: Chair, Cuff Size: Adult Regular)  Pulse 80  Ht 1.829 m (6')  Wt 85 kg (187 lb 6.4 oz)  SpO2 98%  BMI 25.42 kg/m2, sitting in wheelchair, unable to bear weight  Home weight  General: appears comfortable, alert and articulate, frail appearing  Head: normocephalic, atraumatic  Eyes: anicteric sclera, EOMI  Neck: no adenopathy  Orophyarynx: moist mucosa, no lesions, dentition intact  Heart: regular, S1/S2, no murmur, gallop, rub, estimated JVP 10-12 cm  Lungs: clear, no rales or wheezing  Abdomen: soft, non-tender, bowel sounds present, no hepatosplenomegaly  Extremities: no clubbing, cyanosis or edema, left lower extremity cooler to touch, pulses decreased on left but present  Neurological: normal speech and affect, no gross motor deficits    CARDIAC RISK FACTOR  The 10-year ASCVD risk score (Aurora DC Jr, et al., 2013) is: 36.5%    Values used to calculate the score:      Age: 76 years      Sex: Male      Is Non- : No      Diabetic: Yes      Tobacco smoker: No      Systolic Blood Pressure: 110 mmHg      Is BP treated: Yes      HDL Cholesterol: 34 mg/dL      Total Cholesterol: 134 mg/dL    Labs:  CBC RESULTS:  Lab Results   Component Value Date    WBC 8.3 04/13/2017    RBC 3.86 (L) 04/13/2017    HGB 10.3 (L) 04/13/2017    HCT 35.0 (L) 04/13/2017    MCV 91 04/13/2017    MCH 26.7 04/13/2017    MCHC 29.4 (L) 04/13/2017    RDW 18.3 (H) 04/13/2017     04/13/2017       CMP RESULTS:  Lab Results   Component Value Date     05/11/2017    POTASSIUM 4.5 05/11/2017    CHLORIDE 102 05/11/2017    CO2 28 05/11/2017    ANIONGAP 7 05/11/2017    GLC 78 05/11/2017    BUN 14 05/11/2017    CR 0.65 (L) 05/11/2017    GFRESTIMATED >90  Non  GFR Calc   05/11/2017    GFRESTBLACK  >90   GFR Calc   2017    JUSTINO 8.8 2017    BILITOTAL 0.2 2017    ALBUMIN 2.4 (L) 2017    ALKPHOS 154 (H) 2017    ALT 17 2017    AST 10 2017      Lab Results   Component Value Date    A1C 6.1 (H) 02/10/2017     Lab Results   Component Value Date    CHOL 134 2014     Lab Results   Component Value Date    HDL 34 2014     Lab Results   Component Value Date    LDL 48 2014     Lab Results   Component Value Date    TRIG 257 2014     Lab Results   Component Value Date    CHOLHDLRATIO 3.9 2014       INR RESULTS:  Lab Results   Component Value Date    INR 1.22 (H) 2017       No components found for: CK  Lab Results   Component Value Date    MAG 1.4 (L) 2017     Lab Results   Component Value Date    NTBNPI 22667 (H) 2017       Most recent echocardiogram:  Recent Results (from the past 8760 hour(s))   Echo limited with definity    Narrative    [id number]  [id number]  [id number]  [id number]^LUIGI^YANELY^           [facility],[place]  Echocardiography Laboratory  [address] [place] [address]     Name: PETER BUTLER  MRN: [id number]  : 1940  Study Date: 2017 12:24 PM  Age: 76 yrs  Gender: Male  Patient [facility]: [facility]  Reason For Study: Arrhythmia  Ordering Physician: YANELY MEYERS  Performed By: KATI Austin     BSA: 2.1 m2  Height: 72 in  Weight: 190 lb  BP: 128/64 mmHg  _____________________________________________________________________________  __        Procedure  Complete Portable Echo Adult. Contrast Optison. Technically difficult study.  Optison (NDC #9397-3992-88) given intravenously. Patient was given 6 ml  mixture of 3 ml Optison and 6 ml saline. 3 ml wasted. IV start location R  Upper arm . The patient's rhythm is atrial fibrillation.  _____________________________________________________________________________  __        Interpretation  Summary  Technically difficult study.The patient's rhythm is atrial fibrillation.  The left ventricle is severely dilated. Left ventricular systolic function is  severely reduced, ejection fraction is estimated at 25-30%. There is severe  global hypokinesis.  Right ventricular systolic function is mildly reduced.  The right ventricular systolic pressure is approximated at 15 mmHg plus the  right atrial pressure. IVC is plethoric and estimated mean RA pressure is 15  mmHg.  No pericardial effusion present.  _____________________________________________________________________________  __     _____________________________________________________________________________  __     MMode/2D Measurements & Calculations     EF(MOD-bp): 30.0 %        Doppler Measurements & Calculations  TR max alessia: 190.0 cm/sec  TR max P.4 mmHg           _____________________________________________________________________________  __           Report approved by: Edward Snyder 2017 05:09 PM      ECHO COMPLETE WITH OPTISON    Narrative    [id number]  [id number]  [id number]  [id number]^ELOISE^CHARLIE^[facility]  Echocardiography Laboratory  [address] [place] [address]        Name: PETER BUTLER  MRN: [id number]  : 1940  Study Date: 2017 12:38 PM  Age: 76 yrs  Gender: Male  Patient Location: [facility]^[id number]^  Reason For Study: Syncope  Ordering Physician: CHARLIE NAVAS  Referring Physician: CHARLIE NAVAS  Performed By: Liana Lamb     BSA: 2.0 m2  Height: 72 in  Weight: 180 lb  HR: 80  BP: 104/50 mmHg  _____________________________________________________________________________  __        Procedure  Complete Portable Echo Adult. Contrast Optison.  _____________________________________________________________________________  __        Interpretation Summary     The left ventricle is mildly dilated at 6.0 cm. Left ventricular systolic  function is mild to  moderately reduced as the visual ejection fraction is  estimated at 40-45%.  The biplane calculated LVEF = 43%. This decrease in the LVEF is due to mild  global hypokinesia and moderate to severe inferolateral wall hypokinesis.  Grade I or early diastolic dysfunction is noted  There is trace tricuspid regurgitation. The right ventricular systolic  pressure is normal approximated at 16mmHg plus the right atrial pressure.  The left atrium is moderately dilated by volume criteria at 41.6 ml/m2.  There is mild trileaflet aortic sclerosis but no aortic stenosis or  regurgitation is present.  _____________________________________________________________________________  __        Left Ventricle  The left ventricle is mildly dilated. at 6.0 cm. There is normal left  ventricular wall thickness. Grade I or early diastolic dysfunction. E by E  prime ratio is less than 8, that likely suggests normal left ventricular  filling pressures. The visual ejection fraction is estimated at 40-45%. The  biplane calculated LVEF = 43%. Left ventricular systolic function is mild to  moderately reduced. There is mild global hypokinesia of the left ventricle.  There is moderate to severe inferolateral wall hypokinesis. There is no  thrombus seen in the left ventricle.     Right Ventricle  Normal right ventricle structure and size. The right ventricular systolic  function is mildly reduced.     Atria  The left atrium is moderately dilated. Left atrial enlargement is noted by  volume criteria. at 41.6 ml/m2. Right atrial size is normal. Intact atrial  septum.     Mitral Valve  There is mild to moderate mitral annular calcification. There is no evidence  of mitral valve prolapse. There is no mitral valve stenosis.        Tricuspid Valve  The tricuspid valve is normal in structure and function. Right ventricle  systolic pressure estimate normal. There is trace tricuspid regurgitation. The  right ventricular systolic pressure is approximated at  16.6 mmHg plus the  right atrial pressure. The right ventricular systolic pressure is approximated  at 16mmHg plus the right atrial pressure.     Aortic Valve  The aortic valve is normal in structure and function. There is mild trileaflet  aortic sclerosis. No aortic regurgitation is present. No aortic stenosis is  present.     Pulmonic Valve  The pulmonic valve is not well seen, but is grossly normal. There is trace  pulmonic valvular regurgitation.     Vessels  Normal size aorta. The inferior vena cava is not dilated.     Pericardium  The pericardium appears normal. There is no pleural effusion.        Rhythm  The rhythm was normal sinus.  _____________________________________________________________________________  __  MMode/2D Measurements & Calculations  IVSd: 0.65 cm     LVIDd: 5.9 cm  LVIDs: 4.8 cm  LVPWd: 0.76 cm  FS: 18.2 %  EDV(Teich): 173.1 ml  ESV(Teich): 108.9 ml  LV mass(C)d: 154.2 grams  Ao root diam: 3.6 cm  LA dimension: 4.2 cm  asc Aorta Diam: 3.4 cm  LA/Ao: 1.2  LA Volume (BP): 84.8 ml  LA Volume Index (BP): 41.6 ml/m2  TAPSE: 1.0 cm           Doppler Measurements & Calculations  MV E max alessia: 39.1 cm/sec  MV A max alessia: 67.1 cm/sec  MV E/A: 0.58  MV dec time: 0.22 sec  TR max alessia: 203.6 cm/sec  TR max P.6 mmHg  Lateral E/e': 2.8  Medial E/e': 5.5           _____________________________________________________________________________  __           Report approved by: Edward Barry 2017 04:25 PM          Assessment    ASSESSMENT:     1.  Chronic systolic heart failure secondary to ischemic cardiomyopathy.  Decreased left ventricular function with most recent ejection fraction 25%-30%.  He is New York Heart Association class III, stage C.    He is on lisinopril 10 mg p.o. every day, metoprolol 75 mg p.o. every day and potassium chloride 20 mEq p.o. every day.  His weight is up 2 pounds despite starting 40 mg of furosemide, although I am not sure about the reliability of his weights  at the TCU, as he is in a wheelchair wearing different amounts of clothing and not voiding prior to being weighed.     2.  Coronary artery disease, currently not complaining of chest pain.   3.  Hypertension.  Blood pressure controlled in clinic today.   4.  Hyperlipidemia.  Continues on 40 mg of atorvastatin.    5.  Atrial flutter.  The patient continues to be on aspirin 325 mg p.o. every day and metoprolol.  He is not on an anticoagulant.  CHADS-VASc score of 4.  Warfarin has not been started due to his increased risk of falling due to not being able to bear weight in his left lower extremity.   6.  Diabetes mellitus, type 2.   7.  Anemia.   8.  Peripheral artery disease, status post left popliteal bypass complicated with osteomyelitis and 2nd toe amputation.   9.  Renal insufficiency.      PLAN:   1.  No change in medication or therapy today. Continue furosemide at 40 mg po every day. Continue to monitor weight daily.  IF he notices increased thirst or signs of dehydration he should have the TCU call our office.  2.  Encouraged him to continue OT/PT.   3.  Return to clinic with Dr. Valdes as scheduled in 21/2 months.   4.  [facility] 4 weeks.    5.  As has been documented, he should have an ischemic workup, but due to his increased risk of fall, this has been put on hold, and he is being treated medically at this time until he is stronger and has less risk of falling.   6.  Return to clinic with Ms. Susie PA-C, as scheduled on Monday.   7.  His labs were reviewed.  His potassium is 4.5, BUN 14, creatinine 0.6, and proBNP is 16,812.  I did not find another proBNP to compare to.     Thank you for allowing us to be involved in his care.  Feel free to call with questions or concerns.           Plan    Magnolia BERMUDEZ, APRN, CNP  Clinical Nurse Specialist  [facility]Advanced Heart Failure/Mechanical Circulatory Support  Pager

## 2017-05-11 NOTE — LETTER
5/11/2017      RE: Tad Manning  [address]   [facility] [address]       Dear Colleague,    Thank you for the opportunity to participate in the care of your patient, Tad Manning, at the [facility] at [facility]. Please see a copy of my visit note below.    Chief Complaint:    Chief Complaint   Patient presents with     New Patient     New CORE; 76yr old male wih a h/o CAD, Aflutter and chronic systolic HF secondary to ICM presenting for HF follow-up with labs prior.         HPI: PAST MEDICAL HISTORY: Dictation on: 05/11/2017  2:34 PM by: VANESSA ROCHE [[email or web address]]      Dictation on: 05/11/2017  2:42 PM by: VANESSA ROCHE [[email or web address]]         Past Medical History:   Diagnosis Date     Arthritis      ASCVD (arteriosclerotic cardiovascular disease)      BMI 30.0-30.9,adult      BPH (benign prostatic hypertrophy)      Cellulitis      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)      Coronary artery disease     triple bypass 1995     Diabetes mellitus (H)      Diabetic polyneuropathy (H)      History of blood transfusion      Hyperlipidaemia      Hypertension      Malignant neoplasm (H)     CLL     Noninflammatory pericardial effusion      Osteomyelitis of left foot (H)      PVD (peripheral vascular disease) (H)      Sebaceous cyst        PAST SURGICAL HISTORY  Past Surgical History:   Procedure Laterality Date     AMPUTATE TOE(S)  1/10/2014    Procedure: AMPUTATE TOE(S);;  Surgeon: Amador Vick MD;  Location:  OR     BONE MARROW BIOPSY, BONE SPECIMEN, NEEDLE/TROCAR  10/19/2012    Procedure: BIOPSY BONE MARROW;  BONE MARROW BIOPSY  (CONSCIOUS SED);  Surgeon: Ramon Quesada MD;  Location:  GI     BYPASS GRAFT FEMOROPOPLITEAL  1/10/2014    Procedure: BYPASS GRAFT FEMOROPOPLITEAL;  Left above knee popliteal to  Below knee popliteal bypass using transverse saphenous vein graft. Left 2nd Toe amputation;   Surgeon: Amador Vick MD;  Location:  OR     CARDIAC SURGERY      angioplasty with stent, triple bypass     EXCISE CYST GENERIC (LOCATION) N/A 2/1/2016    Procedure: EXCISE CYST GENERIC (LOCATION);  Surgeon: Amador Vick MD;  Location:  SD     GRAFT VEIN FROM EXTREMITY (LOCATION)  1/10/2014    Procedure: GRAFT VEIN FROM EXTREMITY (LOCATION);;  Surgeon: Amador Vick MD;  Location: [facility] OR     LAMINECTOMY THORACIC ONE LEVEL N/A 2/8/2017    Procedure: LAMINECTOMY THORACIC ONE LEVEL;  Surgeon: Marcelo Trent MD;  Location: [facility] OR     OPTICAL TRACKING SYSTEM FUSION POSTERIOR SPINE THORACIC THREE+ LEVELS N/A 2/12/2017    Procedure: OPTICAL TRACKING SYSTEM FUSION POSTERIOR SPINE THORACIC THREE+ LEVELS;  Surgeon: Marcelo Trent MD;  Location: [facility] OR     TONSILLECTOMY       VASCULAR SURGERY      ptcr legs         FAMILY HISTORY:  Family History   Problem Relation Age of Onset     CANCER Mother      leukemia         SOCIAL HISTORY:  Social History     Social History     Marital status: Single     Spouse name: N/A     Number of children: N/A     Years of education: N/A     Social History Main Topics     Smoking status: Former Smoker     Packs/day: 2.00     Years: 30.00     Types: Cigarettes     Quit date: 6/19/1995     Smokeless tobacco: Never Used     Alcohol use Yes      Comment: 1 drink monthly     Drug use: No     Sexual activity: Not Asked     Other Topics Concern     None     Social History Narrative    Lives independently with SO. 2/9/2017        ALLERGIES  Allergies   Allergen Reactions     Blood Transfusion Related (Informational Only) Other (See Comments)     Patient has a history of a clinically significant antibody against RBC antigens.  A delay in compatible RBCs may occur.          CURRENT MEDICATIONS:    Current Outpatient Prescriptions on File Prior to Visit:  nystatin (MYCOSTATIN) cream Apply topically 2 times daily   acetaminophen (TYLENOL) 325 MG tablet Take 2 tablets (650  mg) by mouth every 4 hours as needed for other (surgical pain)   aspirin 325 MG tablet Take 1 tablet (325 mg) by mouth daily   atorvastatin (LIPITOR) 40 MG tablet Take 1 tablet (40 mg) by mouth daily   clopidogrel (PLAVIX) 75 MG tablet Take 1 tablet (75 mg) by mouth daily   insulin glargine (LANTUS) 100 UNIT/ML injection Inject 22 Units Subcutaneous every morning (before breakfast)   lisinopril (PRINIVIL/ZESTRIL) 10 MG tablet Take 1 tablet (10 mg) by mouth daily (Patient taking differently: Take 5 mg by mouth daily )   melatonin 3 MG tablet Take 1 tablet (3 mg) by mouth nightly as needed   metFORMIN (GLUCOPHAGE) 500 MG tablet Take 1 tablet (500 mg) by mouth 2 times daily (with meals)   metoprolol (LOPRESSOR) 25 MG tablet Take 3 tablets (75 mg) by mouth 2 times daily   mirtazapine (REMERON) 15 MG tablet Take 1 tablet (15 mg) by mouth At Bedtime   polyethylene glycol (MIRALAX/GLYCOLAX) Packet Take 17 g by mouth daily as needed for constipation   ranitidine (ZANTAC) 150 MG tablet Take 1 tablet (150 mg) by mouth 2 times daily   potassium chloride SA (K-DUR/KLOR-CON M) 20 MEQ CR tablet Take 1 tablet (20 mEq) by mouth daily   senna-docusate (SENOKOT-S;PERICOLACE) 8.6-50 MG per tablet Take 1-2 tablets by mouth 2 times daily as needed (constipation )   benzonatate (TESSALON) 100 MG capsule Take 1 capsule (100 mg) by mouth 3 times daily as needed for cough   cholecalciferol 1000 UNITS TABS Take 1,000 Units by mouth daily   Skin Protectants, Misc. (CALAZIME SKIN PROTECTANT) PSTE Apply 1 Application topically daily   tamsulosin (FLOMAX) 0.4 MG capsule Take 2 capsules (0.8 mg) by mouth At Bedtime   nitroglycerin (NITROSTAT) 0.4 MG sublingual tablet For chest pain place 1 tablet under the tongue every 5 minutes for 3 doses. If symptoms persist 5 minutes after 1st dose call 911.   order for DME Equipment being ordered: 2 shoe     No current facility-administered medications on file prior to visit.       ROS:   Constitutional:  No fever, chills, or sweats. No weight gain/loss.   ENT: No visual disturbance, ear ache, epistaxis, sore throat.   Allergies/Immunologic: Negative.   Respiratory: No cough, hemoptysis.   Cardiovascular: As per HPI.   GI: No nausea, vomiting, hematemesis, melena, or hematochezia.   : No urinary frequency, dysuria, or hematuria.   Integument: Negative.   Psychiatric: Negative.   Neuro: Negative.   Endocrinology: Negative.   Musculoskeletal: Negative.    EXAM:  /69 (BP Location: Left arm, Patient Position: Chair, Cuff Size: Adult Regular)  Pulse 80  Ht 1.829 m (6')  Wt 85 kg (187 lb 6.4 oz)  SpO2 98%  BMI 25.42 kg/m2  Home weight  General: appears comfortable, alert and articulate  Head: normocephalic, atraumatic  Eyes: anicteric sclera, EOMI  Neck: no adenopathy  Orophyarynx: moist mucosa, no lesions, dentition intact  Heart: regular, S1/S2, no murmur, gallop, rub, estimated JVP 10-12 cm  Lungs: clear, no rales or wheezing  Abdomen: soft, non-tender, bowel sounds present, no hepatosplenomegaly  Extremities: no clubbing, cyanosis or edema  Neurological: normal speech and affect, no gross motor deficits    CARDIAC RISK FACTOR  The 10-year ASCVD risk score (Medora TIFFANIE Jr, et al., 2013) is: 36.5%    Values used to calculate the score:      Age: 76 years      Sex: Male      Is Non- : No      Diabetic: Yes      Tobacco smoker: No      Systolic Blood Pressure: 110 mmHg      Is BP treated: Yes      HDL Cholesterol: 34 mg/dL      Total Cholesterol: 134 mg/dL    Labs:  CBC RESULTS:  Lab Results   Component Value Date    WBC 8.3 04/13/2017    RBC 3.86 (L) 04/13/2017    HGB 10.3 (L) 04/13/2017    HCT 35.0 (L) 04/13/2017    MCV 91 04/13/2017    MCH 26.7 04/13/2017    MCHC 29.4 (L) 04/13/2017    RDW 18.3 (H) 04/13/2017     04/13/2017       CMP RESULTS:  Lab Results   Component Value Date     05/11/2017    POTASSIUM 4.5 05/11/2017    CHLORIDE 102 05/11/2017    CO2 28 05/11/2017     ANIONGAP 7 2017    GLC 78 2017    BUN 14 2017    CR 0.65 (L) 2017    GFRESTIMATED >90  Non  GFR Calc   2017    GFRESTBLACK >90   GFR Calc   2017    JUSTINO 8.8 2017    BILITOTAL 0.2 2017    ALBUMIN 2.4 (L) 2017    ALKPHOS 154 (H) 2017    ALT 17 2017    AST 10 2017      Lab Results   Component Value Date    A1C 6.1 (H) 02/10/2017     Lab Results   Component Value Date    CHOL 134 2014     Lab Results   Component Value Date    HDL 34 2014     Lab Results   Component Value Date    LDL 48 2014     Lab Results   Component Value Date    TRIG 257 2014     Lab Results   Component Value Date    CHOLHDLRATIO 3.9 2014       INR RESULTS:  Lab Results   Component Value Date    INR 1.22 (H) 2017       No components found for: CK  Lab Results   Component Value Date    MAG 1.4 (L) 2017     Lab Results   Component Value Date    NTBNPI 93208 (H) 2017       Most recent echocardiogram:  Recent Results (from the past 8760 hour(s))   Echo limited with definity    Narrative    [id number]  ECH  [id number]  [id number]^LUIGI^YANELY^           [facility]  Echocardiography Laboratory  [address] [place] [id number]     Name: PETER BUTLER  MRN: [id number]  : 1940  Study Date: 2017 12:24 PM  Age: 76 yrs  Gender: Male  Patient Location: [facility]  Reason For Study: Arrhythmia  Ordering Physician: YANELY MEYERS  Performed By: KATI Austin     BSA: 2.1 m2  Height: 72 in  Weight: 190 lb  BP: 128/64 mmHg  _____________________________________________________________________________  __        Procedure  Complete Portable Echo Adult. Contrast Optison. Technically difficult study.  Optison (NDC #0764-8563-15) given intravenously. Patient was given 6 ml  mixture of 3 ml Optison and 6 ml saline. 3 ml wasted. IV start location R  Upper arm  . The patient's rhythm is atrial fibrillation.  _____________________________________________________________________________  __        Interpretation Summary  Technically difficult study.The patient's rhythm is atrial fibrillation.  The left ventricle is severely dilated. Left ventricular systolic function is  severely reduced, ejection fraction is estimated at 25-30%. There is severe  global hypokinesis.  Right ventricular systolic function is mildly reduced.  The right ventricular systolic pressure is approximated at 15 mmHg plus the  right atrial pressure. IVC is plethoric and estimated mean RA pressure is 15  mmHg.  No pericardial effusion present.  _____________________________________________________________________________  __     _____________________________________________________________________________  __     MMode/2D Measurements & Calculations     EF(MOD-bp): 30.0 %        Doppler Measurements & Calculations  TR max alessia: 190.0 cm/sec  TR max P.4 mmHg           _____________________________________________________________________________  __           Report approved by: Edward Snyder 2017 05:09 PM      ECHO COMPLETE WITH OPTISON    Narrative    [id number]  [id number]  [id number]  [id number]^ELOISE^CHARLIE^VALARIE           [facility]  Echocardiography Laboratory  [address] [place] [address]        Name: PETER BUTLER  MRN: [id number]  : 1940  Study Date: 2017 12:38 PM  Age: 76 yrs  Gender: Male  Patient Location: [facility]8828^[id number]^  Reason For Study: Syncope  Ordering Physician: CHARLIE NAVAS  Referring Physician: CHARLIE NAVAS  Performed By: Liana Lamb     BSA: 2.0 m2  Height: 72 in  Weight: 180 lb  HR: 80  BP: 104/50 mmHg  _____________________________________________________________________________  __        Procedure  Complete Portable Echo Adult. Contrast  Optison.  _____________________________________________________________________________  __        Interpretation Summary     The left ventricle is mildly dilated at 6.0 cm. Left ventricular systolic  function is mild to moderately reduced as the visual ejection fraction is  estimated at 40-45%.  The biplane calculated LVEF = 43%. This decrease in the LVEF is due to mild  global hypokinesia and moderate to severe inferolateral wall hypokinesis.  Grade I or early diastolic dysfunction is noted  There is trace tricuspid regurgitation. The right ventricular systolic  pressure is normal approximated at 16mmHg plus the right atrial pressure.  The left atrium is moderately dilated by volume criteria at 41.6 ml/m2.  There is mild trileaflet aortic sclerosis but no aortic stenosis or  regurgitation is present.  _____________________________________________________________________________  __        Left Ventricle  The left ventricle is mildly dilated. at 6.0 cm. There is normal left  ventricular wall thickness. Grade I or early diastolic dysfunction. E by E  prime ratio is less than 8, that likely suggests normal left ventricular  filling pressures. The visual ejection fraction is estimated at 40-45%. The  biplane calculated LVEF = 43%. Left ventricular systolic function is mild to  moderately reduced. There is mild global hypokinesia of the left ventricle.  There is moderate to severe inferolateral wall hypokinesis. There is no  thrombus seen in the left ventricle.     Right Ventricle  Normal right ventricle structure and size. The right ventricular systolic  function is mildly reduced.     Atria  The left atrium is moderately dilated. Left atrial enlargement is noted by  volume criteria. at 41.6 ml/m2. Right atrial size is normal. Intact atrial  septum.     Mitral Valve  There is mild to moderate mitral annular calcification. There is no evidence  of mitral valve prolapse. There is no mitral valve stenosis.         Tricuspid Valve  The tricuspid valve is normal in structure and function. Right ventricle  systolic pressure estimate normal. There is trace tricuspid regurgitation. The  right ventricular systolic pressure is approximated at 16.6 mmHg plus the  right atrial pressure. The right ventricular systolic pressure is approximated  at 16mmHg plus the right atrial pressure.     Aortic Valve  The aortic valve is normal in structure and function. There is mild trileaflet  aortic sclerosis. No aortic regurgitation is present. No aortic stenosis is  present.     Pulmonic Valve  The pulmonic valve is not well seen, but is grossly normal. There is trace  pulmonic valvular regurgitation.     Vessels  Normal size aorta. The inferior vena cava is not dilated.     Pericardium  The pericardium appears normal. There is no pleural effusion.        Rhythm  The rhythm was normal sinus.  _____________________________________________________________________________  __  MMode/2D Measurements & Calculations  IVSd: 0.65 cm     LVIDd: 5.9 cm  LVIDs: 4.8 cm  LVPWd: 0.76 cm  FS: 18.2 %  EDV(Teich): 173.1 ml  ESV(Teich): 108.9 ml  LV mass(C)d: 154.2 grams  Ao root diam: 3.6 cm  LA dimension: 4.2 cm  asc Aorta Diam: 3.4 cm  LA/Ao: 1.2  LA Volume (BP): 84.8 ml  LA Volume Index (BP): 41.6 ml/m2  TAPSE: 1.0 cm           Doppler Measurements & Calculations  MV E max alessia: 39.1 cm/sec  MV A max alessia: 67.1 cm/sec  MV E/A: 0.58  MV dec time: 0.22 sec  TR max alessia: 203.6 cm/sec  TR max P.6 mmHg  Lateral E/e': 2.8  Medial E/e': 5.5           _____________________________________________________________________________  __           Report approved by: Edward Barry 2017 04:25 PM          Assessment     Dictation on: 2017  2:48 PM by: VANESSA ROCHE [[email or web address]]         Plan    Vanessa HORNSMINESH, APRN, CNP  Clinical Nurse Specialist  [facility]Advanced Heart Failure/Mechanical Circulatory Support  Pager     Please do not hesitate to  contact me if you have any questions/concerns.     Sincerely,     OFELIA Flynn CNP

## 2017-05-11 NOTE — LETTER
5/11/2017      RE: Tad Manning  [address]   [facility] [address]       Chief Complaint:    Chief Complaint   Patient presents with     New Patient     New CORE; 76yr old male wih a h/o CAD, Aflutter and chronic systolic HF secondary to ICM presenting for HF follow-up with labs prior.         HPI: PAST MEDICAL HISTORY:HISTORY OF PRESENT ILLNESS:  Mr. Manning is a 74-year-old gentleman with a history of coronary artery disease, ischemic cardiomyopathy, HFpEF, hypertension, hyperlipidemia, atrial flutter, diabetes mellitus, anemia, status post left popliteal bypass 01/10/2017 with second toe amputation.  This was complicated by osteomyelitis and renal insufficiency.  He was admitted to [facility] 01/14 through 01/22 and underwent the left popliteal bypass.  He was readmitted to the hospital for an episode of diskitis in 02/2017 and had a paraspinal abscess, had a surgical compression and received IV antibiotic therapy.  KASSIE at that time showed an ejection fraction of 25-30%.  More recently, he was seen in our clinic by Dr. Raissa Jay on 05/01/2017.  He was started on furosemide 40 mg p.o. every day at that clinic appointment and asked to return to clinic today.  He returns to clinic today with his brother and his significant other for routine followup.      REVIEW OF SYSTEMS:  He states that he is feeling well.   He is unable to stand so we cannot weight him in our clinic today.  At the TCU they are weighing him in his wheelchair it does not appear that he is in the same clothes or voiding every time prior to his weight.  His weight on 05/01 was 185; today it is 187 pounds by TCU records.  He states that he is feeling well.  He feels he has had increased urination with the addition of the 40 mg of furosemide.  He denies any shortness of breath at rest.  He does get short of breath if he is trying to get into the car.  He does sleep with 2 pillows.  States that his sleeping  is poor.  He wakes up every 4-5 hours.  He denies any chest pain, angina or use of nitroglycerin, lightheaded, dizziness, falling, ankle edema, nausea or vomiting.  He continues to wear LUDIVINA hose for habit.  He is receiving OT and PT in the TCU on a daily basis.  He denies use of tobacco.  Denies use of alcohol.  TCU  is checking his blood glucose 3 times per day.       He was admitted to the hospital again 04/06-04/08 for an episode of acute on chronic congestive heart failure.         Past Medical History:   Diagnosis Date     Arthritis      ASCVD (arteriosclerotic cardiovascular disease)      BMI 30.0-30.9,adult      BPH (benign prostatic hypertrophy)      Cellulitis      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)      Coronary artery disease     triple bypass 1995     Diabetes mellitus (H)      Diabetic polyneuropathy (H)      History of blood transfusion      Hyperlipidaemia      Hypertension      Malignant neoplasm (H)     CLL     Noninflammatory pericardial effusion      Osteomyelitis of left foot (H)      PVD (peripheral vascular disease) (H)      Sebaceous cyst        PAST SURGICAL HISTORY  Past Surgical History:   Procedure Laterality Date     AMPUTATE TOE(S)  1/10/2014    Procedure: AMPUTATE TOE(S);;  Surgeon: Amador Vick MD;  Location:  OR     BONE MARROW BIOPSY, BONE SPECIMEN, NEEDLE/TROCAR  10/19/2012    Procedure: BIOPSY BONE MARROW;  BONE MARROW BIOPSY  (CONSCIOUS SED);  Surgeon: Ramon Quesada MD;  Location:  GI     BYPASS GRAFT FEMOROPOPLITEAL  1/10/2014    Procedure: BYPASS GRAFT FEMOROPOPLITEAL;  Left above knee popliteal to  Below knee popliteal bypass using transverse saphenous vein graft. Left 2nd Toe amputation;  Surgeon: Amador Vick MD;  Location:  OR     CARDIAC SURGERY      angioplasty with stent, triple bypass     EXCISE CYST GENERIC (LOCATION) N/A 2/1/2016    Procedure: EXCISE CYST GENERIC (LOCATION);  Surgeon: Amador Vick  MD Esteban;  Location: [facility]     GRAFT VEIN FROM EXTREMITY (LOCATION)  1/10/2014    Procedure: GRAFT VEIN FROM EXTREMITY (LOCATION);;  Surgeon: Amador Vick MD;  Location: [facility] OR     LAMINECTOMY THORACIC ONE LEVEL N/A 2/8/2017    Procedure: LAMINECTOMY THORACIC ONE LEVEL;  Surgeon: Marcelo Trent MD;  Location: [facility] OR     OPTICAL TRACKING SYSTEM FUSION POSTERIOR SPINE THORACIC THREE+ LEVELS N/A 2/12/2017    Procedure: OPTICAL TRACKING SYSTEM FUSION POSTERIOR SPINE THORACIC THREE+ LEVELS;  Surgeon: Marcelo Trent MD;  Location: [facility] OR     TONSILLECTOMY       VASCULAR SURGERY      ptcr legs         FAMILY HISTORY:  Family History   Problem Relation Age of Onset     CANCER Mother      leukemia         SOCIAL HISTORY:  Social History     Social History     Marital status: Single     Spouse name: N/A     Number of children: N/A     Years of education: N/A     Social History Main Topics     Smoking status: Former Smoker     Packs/day: 2.00     Years: 30.00     Types: Cigarettes     Quit date: 6/19/1995     Smokeless tobacco: Never Used     Alcohol use Yes      Comment: 1 drink monthly     Drug use: No     Sexual activity: Not Asked     Other Topics Concern     None     Social History Narrative    Lives independently with SO. 2/9/2017        ALLERGIES  Allergies   Allergen Reactions     Blood Transfusion Related (Informational Only) Other (See Comments)     Patient has a history of a clinically significant antibody against RBC antigens.  A delay in compatible RBCs may occur.          CURRENT MEDICATIONS:    Current Outpatient Prescriptions on File Prior to Visit:  nystatin (MYCOSTATIN) cream Apply topically 2 times daily   acetaminophen (TYLENOL) 325 MG tablet Take 2 tablets (650 mg) by mouth every 4 hours as needed for other (surgical pain)   aspirin 325 MG tablet Take 1 tablet (325 mg) by mouth daily   atorvastatin (LIPITOR) 40 MG tablet Take 1 tablet (40 mg) by mouth daily   clopidogrel (PLAVIX) 75 MG  tablet Take 1 tablet (75 mg) by mouth daily   insulin glargine (LANTUS) 100 UNIT/ML injection Inject 22 Units Subcutaneous every morning (before breakfast)   lisinopril (PRINIVIL/ZESTRIL) 10 MG tablet Take 1 tablet (10 mg) by mouth daily (Patient taking differently: Take 5 mg by mouth daily )   melatonin 3 MG tablet Take 1 tablet (3 mg) by mouth nightly as needed   metFORMIN (GLUCOPHAGE) 500 MG tablet Take 1 tablet (500 mg) by mouth 2 times daily (with meals)   metoprolol (LOPRESSOR) 25 MG tablet Take 3 tablets (75 mg) by mouth 2 times daily   mirtazapine (REMERON) 15 MG tablet Take 1 tablet (15 mg) by mouth At Bedtime   polyethylene glycol (MIRALAX/GLYCOLAX) Packet Take 17 g by mouth daily as needed for constipation   ranitidine (ZANTAC) 150 MG tablet Take 1 tablet (150 mg) by mouth 2 times daily   potassium chloride SA (K-DUR/KLOR-CON M) 20 MEQ CR tablet Take 1 tablet (20 mEq) by mouth daily   senna-docusate (SENOKOT-S;PERICOLACE) 8.6-50 MG per tablet Take 1-2 tablets by mouth 2 times daily as needed (constipation )   benzonatate (TESSALON) 100 MG capsule Take 1 capsule (100 mg) by mouth 3 times daily as needed for cough   cholecalciferol 1000 UNITS TABS Take 1,000 Units by mouth daily   Skin Protectants, Misc. (CALAZIME SKIN PROTECTANT) PSTE Apply 1 Application topically daily   tamsulosin (FLOMAX) 0.4 MG capsule Take 2 capsules (0.8 mg) by mouth At Bedtime   nitroglycerin (NITROSTAT) 0.4 MG sublingual tablet For chest pain place 1 tablet under the tongue every 5 minutes for 3 doses. If symptoms persist 5 minutes after 1st dose call 911.   order for DME Equipment being ordered: 2 shoe     No current facility-administered medications on file prior to visit.       ROS:   Constitutional: No fever, chills, or sweats. No weight gain/loss.   ENT: No visual disturbance, ear ache, epistaxis, sore throat.   Allergies/Immunologic: Negative.   Respiratory: No cough, hemoptysis.   Cardiovascular: As per HPI.   GI: No  nausea, vomiting, hematemesis, melena, or hematochezia.   : No urinary frequency, dysuria, or hematuria.   Integument: Negative.   Psychiatric: Negative.   Neuro: Negative.   Endocrinology: Negative.   Musculoskeletal: Negative.    EXAM:  /69 (BP Location: Left arm, Patient Position: Chair, Cuff Size: Adult Regular)  Pulse 80  Ht 1.829 m (6')  Wt 85 kg (187 lb 6.4 oz)  SpO2 98%  BMI 25.42 kg/m2, sitting in wheelchair, unable to bear weight  Home weight  General: appears comfortable, alert and articulate, frail appearing  Head: normocephalic, atraumatic  Eyes: anicteric sclera, EOMI  Neck: no adenopathy  Orophyarynx: moist mucosa, no lesions, dentition intact  Heart: regular, S1/S2, no murmur, gallop, rub, estimated JVP 10-12 cm  Lungs: clear, no rales or wheezing  Abdomen: soft, non-tender, bowel sounds present, no hepatosplenomegaly  Extremities: no clubbing, cyanosis or edema, left lower extremity cooler to touch, pulses decreased on left but present  Neurological: normal speech and affect, no gross motor deficits    CARDIAC RISK FACTOR  The 10-year ASCVD risk score (Seville DC Jr, et al., 2013) is: 36.5%    Values used to calculate the score:      Age: 76 years      Sex: Male      Is Non- : No      Diabetic: Yes      Tobacco smoker: No      Systolic Blood Pressure: 110 mmHg      Is BP treated: Yes      HDL Cholesterol: 34 mg/dL      Total Cholesterol: 134 mg/dL    Labs:  CBC RESULTS:  Lab Results   Component Value Date    WBC 8.3 04/13/2017    RBC 3.86 (L) 04/13/2017    HGB 10.3 (L) 04/13/2017    HCT 35.0 (L) 04/13/2017    MCV 91 04/13/2017    MCH 26.7 04/13/2017    MCHC 29.4 (L) 04/13/2017    RDW 18.3 (H) 04/13/2017     04/13/2017       CMP RESULTS:  Lab Results   Component Value Date     05/11/2017    POTASSIUM 4.5 05/11/2017    CHLORIDE 102 05/11/2017    CO2 28 05/11/2017    ANIONGAP 7 05/11/2017    GLC 78 05/11/2017    BUN 14 05/11/2017    CR 0.65 (L) 05/11/2017     GFRESTIMATED >90  Non  GFR Calc   2017    GFRESTBLACK >90   GFR Calc   2017    JUSTINO 8.8 2017    BILITOTAL 0.2 2017    ALBUMIN 2.4 (L) 2017    ALKPHOS 154 (H) 2017    ALT 17 2017    AST 10 2017      Lab Results   Component Value Date    A1C 6.1 (H) 02/10/2017     Lab Results   Component Value Date    CHOL 134 2014     Lab Results   Component Value Date    HDL 34 2014     Lab Results   Component Value Date    LDL 48 2014     Lab Results   Component Value Date    TRIG 257 2014     Lab Results   Component Value Date    CHOLHDLRATIO 3.9 2014       INR RESULTS:  Lab Results   Component Value Date    INR 1.22 (H) 2017       No components found for: CK  Lab Results   Component Value Date    MAG 1.4 (L) 2017     Lab Results   Component Value Date    NTBNPI 27697 (H) 2017       Most recent echocardiogram:  Recent Results (from the past 8760 hour(s))   Echo limited with definity    Narrative    [id number]  [id number]  [id number]  [id number]^LUIGI^YANELY^           [facility]  Echocardiography Laboratory  [address] [place] [address]     Name: PETER BUTLER  MRN: [id number]  : 1940  Study Date: 2017 12:24 PM  Age: 76 yrs  Gender: Male  Patient Location: [facility]  Reason For Study: Arrhythmia  Ordering Physician: YANELY MEYERS  Performed By: KATI Austin     BSA: 2.1 m2  Height: 72 in  Weight: 190 lb  BP: 128/64 mmHg  _____________________________________________________________________________  __        Procedure  Complete Portable Echo Adult. Contrast Optison. Technically difficult study.  Optison (NDC #5114-2451-40) given intravenously. Patient was given 6 ml  mixture of 3 ml Optison and 6 ml saline. 3 ml wasted. IV start location R  Upper arm . The patient's rhythm is atrial  fibrillation.  _____________________________________________________________________________  __        Interpretation Summary  Technically difficult study.The patient's rhythm is atrial fibrillation.  The left ventricle is severely dilated. Left ventricular systolic function is  severely reduced, ejection fraction is estimated at 25-30%. There is severe  global hypokinesis.  Right ventricular systolic function is mildly reduced.  The right ventricular systolic pressure is approximated at 15 mmHg plus the  right atrial pressure. IVC is plethoric and estimated mean RA pressure is 15  mmHg.  No pericardial effusion present.  _____________________________________________________________________________  __     _____________________________________________________________________________  __     MMode/2D Measurements & Calculations     EF(MOD-bp): 30.0 %        Doppler Measurements & Calculations  TR max alessia: 190.0 cm/sec  TR max P.4 mmHg           _____________________________________________________________________________  __           Report approved by: Edward Snyder 2017 05:09 PM      ECHO COMPLETE WITH OPTISON    Narrative    [id number]  [id number]  [id number]  [id number]^ELOISE^CHARLIE^VALARIE           [facility]  Echocardiography Laboratory  [address] [place] [id number]        Name: PETER BUTLER  MRN: [id number]  : 1940  Study Date: 2017 12:38 PM  Age: 76 yrs  Gender: Male  Patient Location: [facility]^[id number]^  Reason For Study: Syncope  Ordering Physician: CHARLIE NAVAS  Referring Physician: CHARLIE NAVAS  Performed By: Liana Lamb     BSA: 2.0 m2  Height: 72 in  Weight: 180 lb  HR: 80  BP: 104/50 mmHg  _____________________________________________________________________________  __        Procedure  Complete Portable Echo Adult. Contrast Optison.  _____________________________________________________________________________  __         Interpretation Summary     The left ventricle is mildly dilated at 6.0 cm. Left ventricular systolic  function is mild to moderately reduced as the visual ejection fraction is  estimated at 40-45%.  The biplane calculated LVEF = 43%. This decrease in the LVEF is due to mild  global hypokinesia and moderate to severe inferolateral wall hypokinesis.  Grade I or early diastolic dysfunction is noted  There is trace tricuspid regurgitation. The right ventricular systolic  pressure is normal approximated at 16mmHg plus the right atrial pressure.  The left atrium is moderately dilated by volume criteria at 41.6 ml/m2.  There is mild trileaflet aortic sclerosis but no aortic stenosis or  regurgitation is present.  _____________________________________________________________________________  __        Left Ventricle  The left ventricle is mildly dilated. at 6.0 cm. There is normal left  ventricular wall thickness. Grade I or early diastolic dysfunction. E by E  prime ratio is less than 8, that likely suggests normal left ventricular  filling pressures. The visual ejection fraction is estimated at 40-45%. The  biplane calculated LVEF = 43%. Left ventricular systolic function is mild to  moderately reduced. There is mild global hypokinesia of the left ventricle.  There is moderate to severe inferolateral wall hypokinesis. There is no  thrombus seen in the left ventricle.     Right Ventricle  Normal right ventricle structure and size. The right ventricular systolic  function is mildly reduced.     Atria  The left atrium is moderately dilated. Left atrial enlargement is noted by  volume criteria. at 41.6 ml/m2. Right atrial size is normal. Intact atrial  septum.     Mitral Valve  There is mild to moderate mitral annular calcification. There is no evidence  of mitral valve prolapse. There is no mitral valve stenosis.        Tricuspid Valve  The tricuspid valve is normal in structure and function. Right ventricle  systolic  pressure estimate normal. There is trace tricuspid regurgitation. The  right ventricular systolic pressure is approximated at 16.6 mmHg plus the  right atrial pressure. The right ventricular systolic pressure is approximated  at 16mmHg plus the right atrial pressure.     Aortic Valve  The aortic valve is normal in structure and function. There is mild trileaflet  aortic sclerosis. No aortic regurgitation is present. No aortic stenosis is  present.     Pulmonic Valve  The pulmonic valve is not well seen, but is grossly normal. There is trace  pulmonic valvular regurgitation.     Vessels  Normal size aorta. The inferior vena cava is not dilated.     Pericardium  The pericardium appears normal. There is no pleural effusion.        Rhythm  The rhythm was normal sinus.  _____________________________________________________________________________  __  MMode/2D Measurements & Calculations  IVSd: 0.65 cm     LVIDd: 5.9 cm  LVIDs: 4.8 cm  LVPWd: 0.76 cm  FS: 18.2 %  EDV(Teich): 173.1 ml  ESV(Teich): 108.9 ml  LV mass(C)d: 154.2 grams  Ao root diam: 3.6 cm  LA dimension: 4.2 cm  asc Aorta Diam: 3.4 cm  LA/Ao: 1.2  LA Volume (BP): 84.8 ml  LA Volume Index (BP): 41.6 ml/m2  TAPSE: 1.0 cm           Doppler Measurements & Calculations  MV E max alessia: 39.1 cm/sec  MV A max alessia: 67.1 cm/sec  MV E/A: 0.58  MV dec time: 0.22 sec  TR max alessia: 203.6 cm/sec  TR max P.6 mmHg  Lateral E/e': 2.8  Medial E/e': 5.5           _____________________________________________________________________________  __           Report approved by: Edward Barry 2017 04:25 PM          Assessment    ASSESSMENT:     1.  Chronic systolic heart failure secondary to ischemic cardiomyopathy.  Decreased left ventricular function with most recent ejection fraction 25%-30%.  He is New York Heart Association class III, stage C.    He is on lisinopril 10 mg p.o. every day, metoprolol 75 mg p.o. every day and potassium chloride 20 mEq p.o. every day.   His weight is up 2 pounds despite starting 40 mg of furosemide, although I am not sure about the reliability of his weights at the TCU, as he is in a wheelchair wearing different amounts of clothing and not voiding prior to being weighed.     2.  Coronary artery disease, currently not complaining of chest pain.   3.  Hypertension.  Blood pressure controlled in clinic today.   4.  Hyperlipidemia.  Continues on 40 mg of atorvastatin.    5.  Atrial flutter.  The patient continues to be on aspirin 325 mg p.o. every day and metoprolol.  He is not on an anticoagulant.  CHADS-VASc score of 4.  Warfarin has not been started due to his increased risk of falling due to not being able to bear weight in his left lower extremity.   6.  Diabetes mellitus, type 2.   7.  Anemia.   8.  Peripheral artery disease, status post left popliteal bypass complicated with osteomyelitis and 2nd toe amputation.   9.  Renal insufficiency.      PLAN:   1.  No change in medication or therapy today. Continue furosemide at 40 mg po every day. Continue to monitor weight daily.  IF he notices increased thirst or signs of dehydration he should have the TCU call our office.  2.  Encouraged him to continue OT/PT.   3.  Return to clinic with Dr. Valdes as scheduled in 21/2 months.   4.  Return to [facility] Clinic in 4 weeks.    5.  As has been documented, he should have an ischemic workup, but due to his increased risk of fall, this has been put on hold, and he is being treated medically at this time until he is stronger and has less risk of falling.   6.  Return to clinic with Ms. Susie PA-C, as scheduled on Monday.   7.  His labs were reviewed.  His potassium is 4.5, BUN 14, creatinine 0.6, and proBNP is 16,812.  I did not find another proBNP to compare to.     Thank you for allowing us to be involved in his care.  Feel free to call with questions or concerns.           Plan    Magnolia BERMUDEZ, APRN, CNP  Clinical Nurse Specialist  CORE  Clinic/Advanced Heart Failure/Mechanical Circulatory Support  Pager     Magnolia Cifuentes, APRN CNP

## 2017-05-11 NOTE — PATIENT INSTRUCTIONS
"You were seen today in the Cardiovascular Clinic at the [facility].     Cardiology Providers you saw during your visit: Magnolia Cifuentes NP       1. No change in medications and treatment   2. Please make a follow-up heart failure appt with [facility] in 4 weeks with labs prior.     Results for PETER BUTLER (MRN [id number]) as of 2017 13:08   Ref. Range 2017 12:38   Sodium Latest Ref Range: 133 - 144 mmol/L 137   Potassium Latest Ref Range: 3.4 - 5.3 mmol/L 4.5   Chloride Latest Ref Range: 94 - 109 mmol/L 102   Carbon Dioxide Latest Ref Range: 20 - 32 mmol/L 28   Urea Nitrogen Latest Ref Range: 7 - 30 mg/dL 14   Creatinine Latest Ref Range: 0.66 - 1.25 mg/dL 0.65 (L)   GFR Estimate Latest Ref Range: >60 mL/min/1.7m2 >90...   GFR Estimate If Black Latest Ref Range: >60 mL/min/1.7m2 >90...   Calcium Latest Ref Range: 8.5 - 10.1 mg/dL 8.8   Anion Gap Latest Ref Range: 3 - 14 mmol/L 7   N-Terminal Pro Bnp Latest Ref Range: 0 - 125 pg/mL 32036 (H)   Glucose Latest Ref Range: 70 - 99 mg/dL 78       Please limit your fluid intake to 2 L (64 ounces) daily.  2 Liters a day = 8.5 cups, or 72 ounces.  Please limit your salt intake to 2 grams a day or less.    If you gain 2# in 24 hours or 5# in one week call Nicole Hollingsworth RN so we can adjust your medications as needed over the phone.    Please feel free to call me with any questions or concerns.      Nicole Hollingsworth RN BSN CHFN  [facility]  Cardiology Care Coordinator-Heart Failure Clinic    Questions and [phone].   First press #1 for the [facility] and then press #3 for \"Medical Questions\" to reach us Cardiology Nurses.     On Call Cardiologist for after hours or on weekends: [phone]   option #4 and ask to speak to the on-call Cardiologist. Inform them you are a CORE/heart failure patient at the [place].        If you need a medication refill please contact your pharmacy.  Please allow 3 business " days for your refill to be completed.  _______________________________________________________  [facility]IN[facility] Cardiomyopathy, Optimization, Rehabilitation, Education   The [facility].R.E[facility] CLINIC is a heart failure specialty clinic within the [facility] where you will work with specialized nurse practitioners dedicated to helping patients with heart failure carefully adjust medications, receive education, and learn who and when to call if symptoms develop. They specialize in helping you better understand your condition, slow the progression of your disease, improve the length and quality of your life, help you detect future heart problems before they become life threatening, and avoid hospitalizations.  As always, thank you for trusting us with your health care needs!

## 2017-05-11 NOTE — NURSING NOTE
Diet: Patient instructed regarding a heart failure healthy diet, including discussion of reduced fat and 2000 mg daily sodium restriction, daily weights, medication purpose and compliance, fluid restrictions and resources for patient and family to access for assistance with heart failure management.       Labs: Patient was given results of the laboratory testing obtained today and patient was instructed about when to return for the next laboratory testing.    Med Reconcile: Reviewed and verified all current medications with the patient. The updated medication list was printed and given to the patient.    Return Appointment: Patient given instructions regarding scheduling next clinic visit.     Patient stated he understood all health information given and agreed to call with further questions or concerns.

## 2017-05-11 NOTE — NURSING NOTE
Chief Complaint   Patient presents with     New Patient     New CORE; 76yr old male wih a h/o CAD, Aflutter and chronic systolic HF secondary to ICM presenting for HF follow-up with labs prior.

## 2017-05-15 ENCOUNTER — OFFICE VISIT (OUTPATIENT)
Dept: NEUROSURGERY | Facility: CLINIC | Age: 77
End: 2017-05-15

## 2017-05-15 VITALS — DIASTOLIC BLOOD PRESSURE: 66 MMHG | HEART RATE: 67 BPM | HEIGHT: 72 IN | SYSTOLIC BLOOD PRESSURE: 112 MMHG

## 2017-05-15 DIAGNOSIS — M46.24 OSTEOMYELITIS OF THORACIC SPINE (H): Primary | ICD-10-CM

## 2017-05-15 DIAGNOSIS — Z98.890 POST-OPERATIVE STATE: ICD-10-CM

## 2017-05-15 ASSESSMENT — PAIN SCALES - GENERAL: PAINLEVEL: NO PAIN (0)

## 2017-05-15 NOTE — NURSING NOTE
Chief Complaint   Patient presents with     RECHECK     4 week f/u with xray prior, osteomyelitis of thoracic spine.    Jose Luna, CMA

## 2017-05-15 NOTE — MR AVS SNAPSHOT
After Visit Summary   5/15/2017    Tad Manning    MRN: [id number]           Patient Information     Date Of Birth          1940        Visit Information        Provider Department      5/15/2017 4:00 PM Marge Richards PA-C [facility] Neurosurgery        Today's Diagnoses     Osteomyelitis of thoracic spine (H)    -  1    Post-operative state           Follow-ups after your visit        Your next 10 appointments already scheduled     May 25, 2017 10:30 AM CDT   (Arrive by 10:15 AM)   Return Visit with Gillian Max MD   [facility] ([facility])    [address] [place] [id number]   [phone]            Jun 08, 2017 11:30 AM CDT   Lab with  LAB   [facility] ([facility])    [address] [place] [id number]   [phone]            Jun 08, 2017 12:00 PM CDT   (Arrive by 11:45 AM)   CORE RETURN with OFELIA Faulkner [facility] ([facility])    [address] [place] [id number]   [phone]            Aug 07, 2017  1:00 PM CDT   (Arrive by 12:45 PM)   Return Visit with Clarisse Valdes MD   [facility] ([facility])    [address] [place] [id number]   [phone]            Sep 18, 2017  8:00 AM CDT   XR LEG LENGTH EVALUATION with UCXR1   [facility] ([facility])    [address] [place] [id number]   [phone]           Please bring a list of your current medicines to your exam. (Include vitamins, minerals and over-thecounter medicines.) Leave your valuables at home.  Tell your doctor if there is a chance you may be pregnant.  You do not need to do anything special for this exam.            Sep 18, 2017  8:30 AM CDT   (Arrive by 8:15 AM)   Return  Visit with Marcelo Trent MD   [facility] ([facility])    [address]  3rd Floor  [facility] [address] [phone]              Future tests that were ordered for you today     Open Future Orders        Priority Expected Expires Ordered    X-ray Spine complete (Cervicothoracolumbar AP and lateral - standing views preferred) [[id number]] Routine 2017 5/15/2018 5/15/2017    X-ray Thoracic spine 2 views (AP and lateral - standing views preferred) [[id number]] Routine 2017 5/15/2018 5/15/2017            Who to contact     Please call your clinic at [phone] to:    Ask questions about your health    Make or cancel appointments    Discuss your medicines    Learn about your test results    Speak to your doctor   If you have compliments or concerns about an experience at your clinic, or if you wish to file a complaint, please contact [facility] Patient Relations at [phone] or email us at [email or web address]         Additional Information About Your Visit        MogoTixharNetsocket Information     Patient Communicator is an electronic gateway that provides easy, online access to your medical records. With Patient Communicator, you can request a clinic appointment, read your test results, renew a prescription or communicate with your care team.     To sign up for Patient Communicator visit the website at [email or web address]   You will be asked to enter the access code listed below, as well as some personal information. Please follow the directions to create your username and password.     Your access code is: [email or web address]  Expires: 2017 12:30 PM     Your access code will  in 90 days. If you need help or a new code, please contact your [facility] or call [phone] for assistance.        Care EveryWhere ID     This is your Care EveryWhere ID. This could be used by other organizations to access your [facility]  records  [id number]        Your Vitals Were     Pulse Height                67 1.829 m (6')           Blood Pressure from Last 3 Encounters:   05/15/17 112/66   05/11/17 110/69   05/01/17 92/53    Weight from Last 3 Encounters:   05/11/17 85 kg (187 lb 6.4 oz)   05/01/17 83.9 kg (185 lb)   04/17/17 86.2 kg (190 lb)                 Today's Medication Changes          These changes are accurate as of: 5/15/17  4:33 PM.  If you have any questions, ask your nurse or doctor.               These medicines have changed or have updated prescriptions.        Dose/Directions    lisinopril 10 MG tablet   Commonly known as:  PRINIVIL/ZESTRIL   This may have changed:  how much to take   Used for:  Essential hypertension        Dose:  10 mg   Take 1 tablet (10 mg) by mouth daily   Quantity:  30 tablet   Refills:  0                Primary Care Provider Office Phone # Fax #    Gene Guevara [phone]       [place] [address]        Thank you!     Thank you for choosing [facility]  for your care. Our goal is always to provide you with excellent care. Hearing back from our patients is one way we can continue to improve our services. Please take a few minutes to complete the written survey that you may receive in the mail after your visit with us. Thank you!             Your Updated Medication List - Protect others around you: Learn how to safely use, store and throw away your medicines at www.disposemymeds.org.          This list is accurate as of: 5/15/17  4:33 PM.  Always use your most recent med list.                   Brand Name Dispense Instructions for use    acetaminophen 325 MG tablet    TYLENOL    100 tablet    Take 2 tablets (650 mg) by mouth every 4 hours as needed for other (surgical pain)       aspirin 325 MG tablet     120 tablet    Take 1 tablet (325 mg) by mouth daily       atorvastatin 40 MG tablet    LIPITOR    30 tablet    Take 1 tablet  (40 mg) by mouth daily       benzonatate 100 MG capsule    TESSALON    42 capsule    Take 1 capsule (100 mg) by mouth 3 times daily as needed for cough       CALAZIME SKIN PROTECTANT Pste     1 Tube    Apply 1 Application topically daily       cholecalciferol 1000 UNITS Tabs     30 tablet    Take 1,000 Units by mouth daily       clopidogrel 75 MG tablet    PLAVIX    30 tablet    Take 1 tablet (75 mg) by mouth daily       insulin glargine 100 UNIT/ML injection    LANTUS    30 mL    Inject 22 Units Subcutaneous every morning (before breakfast)       LASIX 40 MG tablet   Generic drug:  furosemide      Take 40 mg by mouth daily If weight goes up 2.5 lbs, pt has to take another pill in the PM.       lisinopril 10 MG tablet    PRINIVIL/ZESTRIL    30 tablet    Take 1 tablet (10 mg) by mouth daily       melatonin 3 MG tablet      Take 1 tablet (3 mg) by mouth nightly as needed       metFORMIN 500 MG tablet    GLUCOPHAGE    60 tablet    Take 1 tablet (500 mg) by mouth 2 times daily (with meals)       metoprolol 25 MG tablet    LOPRESSOR    60 tablet    Take 3 tablets (75 mg) by mouth 2 times daily       mirtazapine 15 MG tablet    REMERON    60 tablet    Take 1 tablet (15 mg) by mouth At Bedtime       nitroglycerin 0.4 MG sublingual tablet    NITROSTAT    25 tablet    For chest pain place 1 tablet under the tongue every 5 minutes for 3 doses. If symptoms persist 5 minutes after 1st dose call 911.       nystatin cream    MYCOSTATIN     Apply topically 2 times daily       order for DME     1 Device    Equipment being ordered: DH2 shoe       polyethylene glycol Packet    MIRALAX/GLYCOLAX    7 packet    Take 17 g by mouth daily as needed for constipation       potassium chloride SA 20 MEQ CR tablet    K-DUR/KLOR-CON M    90 tablet    Take 1 tablet (20 mEq) by mouth daily       ranitidine 150 MG tablet    ZANTAC    60 tablet    Take 1 tablet (150 mg) by mouth 2 times daily       senna-docusate 8.6-50 MG per tablet     SENOKOT-S;PERICOLACE    100 tablet    Take 1-2 tablets by mouth 2 times daily as needed (constipation )       tamsulosin 0.4 MG capsule    FLOMAX    60 capsule    Take 2 capsules (0.8 mg) by mouth At Bedtime

## 2017-05-15 NOTE — PROGRESS NOTES
Neurosurgery clinic post   Date of visit:   5/15/2017    Procedure:     DOS 2/12/17 Dr Trent   DIAGNOSIS:   1. Thoracic spine instability.   2. Osteomyelitis and discitis T9-T10 s/p laminectomy.   PROCEDURES PERFORMED:   1. T7-T12 posterior segmental instrumentation.   2. Transpedicular, transforaminal right-sided T9-T10 interbody fusion.   3. Posterior arthrodesis T7-T12.  4. Use of intraoperative neuro-navigation.   5. Use of intraoperative O-arm.   6. Use of intraoperative cell saver.   7. Use of allograft bone.     OPERATIVE INDICATIONS: Mr. Tad Manning is a 76-year-old male who presented to the Neurosurgery Service at the [facility] with concerns of loss of ambulation over days. He had fallen in the early part of 02/2017 and had weakness in bilateral extremities with difficulty ambulation which progressed to complete inability to ambulate. On imaging of his spine, he was found to have an infection spondylitis at T9-T10 with a paraspinal abscess. For spinal cord compression, he underwent a T9-T10 laminectomy and partial T9 transpedicular disk space abscess removal on 02/09/2017. He subsequently was started on antibiotics and was followed by Infectious Disease team. For concerns of thoracic instability, he was then electively brought to the operating room today on 02/12/2017 for posterior stabilization.     DOS: 02/09/2017  Dr Trent  DIAGNOSIS: Epidural abscess, osteomyelitis.   PROCEDURES PERFORMED:   1. T9-T10 laminectomy.   2. T9 transpedicular left-sided transpedicular disk space abscess evacuation.       INDICATION FOR PROCEDURE: Mr. Tad Manning is a 76-year-old male with history of coronary artery disease, CLL in remission,  diabetes, hypertension, peripheral vascular disease, on aspirin and Plavix, who has had progressive lower extremity weakness from a few days prior. He now reports that he is minimally to move his legs. An MRI was done which showed severe T9-10 cord  impingement from epidural abscess. For these reasons, he was brought to the operating room for evacuation.     HPI:   He is now about 3 months status post the above procedures.   The procedures were without incident.  After the fusion  he was admitted to the floor for post-operative cares. He continued to progress with therapies, his strength improved to antigravity in lower extremities and he was able to stand upright. It was recommended he discharge to acute rehabilitation for further rehabilitation cares, and was therefore discharge in stable condition.   Exam upon discharge  Mental Status: alert and oriented x3   Cranial Nerves: grossly normal   Sensory: absent to light touch in toes and plantar aspects of feet b/l; also diminished to LT at finger tips b/l   Strength: 4+/5 in bilateral upper extremities   HF  KE  DF  EHL  PF   R  1/5 2/5 3/5 3/5 4/5  L  1/5 3/5 3/5 3/5 4/5   Reflexes: 1+ at patellar and absent at Achilles b/l  He's now been discharged from acute inpatient rehab to TCU on 4/8/17.  He remains in the TCU and he continues to make gains but not as fast anymore. He has minimal pain.  He presents for routine 12 week check.    STATUS REPORT  WORK:         Position: Retired  ACTIVITY:     Has been following activity restrictions and working with PT   MEDS:                  Pain         Tylenol rarely         NSAIDS   None    FU:                ID:   Dr Max.  Seeing again next week.  Recommended Amoxicillin for  X3 months total, has 2 months to go.    Patient Supplied Answers To the [facility] Pain Questionnaire  [facility] Pain -  Patient Entered Questionnaire/Answers 5/15/2017   How would you describe the pain? other   Which of the following improve or reduce your pain?  lying down   What number best describes your LOWEST pain in past 24 hours:  0 = No pain  to  10 = Worst pain imaginable -   What number best describes your WORST pain in past 24 hours:  0 = No pain  to  10 = Worst pain imaginable -   Have you tried  treating your pain with medication?  No   Are you currently taking medications for your pain? No       Current Outpatient Prescriptions:      furosemide (LASIX) 40 MG tablet, Take 40 mg by mouth daily If weight goes up 2.5 lbs, pt has to take another pill in the PM., Disp: , Rfl:      nystatin (MYCOSTATIN) cream, Apply topically 2 times daily, Disp: , Rfl:      acetaminophen (TYLENOL) 325 MG tablet, Take 2 tablets (650 mg) by mouth every 4 hours as needed for other (surgical pain), Disp: 100 tablet, Rfl: 0     aspirin 325 MG tablet, Take 1 tablet (325 mg) by mouth daily, Disp: 120 tablet, Rfl: 0     atorvastatin (LIPITOR) 40 MG tablet, Take 1 tablet (40 mg) by mouth daily, Disp: 30 tablet, Rfl: 1     clopidogrel (PLAVIX) 75 MG tablet, Take 1 tablet (75 mg) by mouth daily, Disp: 30 tablet, Rfl:      insulin glargine (LANTUS) 100 UNIT/ML injection, Inject 22 Units Subcutaneous every morning (before breakfast), Disp: 30 mL, Rfl: 0     lisinopril (PRINIVIL/ZESTRIL) 10 MG tablet, Take 1 tablet (10 mg) by mouth daily (Patient taking differently: Take 5 mg by mouth daily ), Disp: 30 tablet, Rfl:      melatonin 3 MG tablet, Take 1 tablet (3 mg) by mouth nightly as needed, Disp: , Rfl:      metFORMIN (GLUCOPHAGE) 500 MG tablet, Take 1 tablet (500 mg) by mouth 2 times daily (with meals), Disp: 60 tablet, Rfl:      metoprolol (LOPRESSOR) 25 MG tablet, Take 3 tablets (75 mg) by mouth 2 times daily, Disp: 60 tablet, Rfl:      mirtazapine (REMERON) 15 MG tablet, Take 1 tablet (15 mg) by mouth At Bedtime, Disp: 60 tablet, Rfl:      polyethylene glycol (MIRALAX/GLYCOLAX) Packet, Take 17 g by mouth daily as needed for constipation, Disp: 7 packet, Rfl: 0     ranitidine (ZANTAC) 150 MG tablet, Take 1 tablet (150 mg) by mouth 2 times daily, Disp: 60 tablet, Rfl:      potassium chloride SA (K-DUR/KLOR-CON M) 20 MEQ CR tablet, Take 1 tablet (20 mEq) by mouth daily, Disp: 90 tablet, Rfl:      senna-docusate (SENOKOT-S;PERICOLACE) 8.6-50  MG per tablet, Take 1-2 tablets by mouth 2 times daily as needed (constipation ), Disp: 100 tablet, Rfl: 0     benzonatate (TESSALON) 100 MG capsule, Take 1 capsule (100 mg) by mouth 3 times daily as needed for cough, Disp: 42 capsule, Rfl:      cholecalciferol 1000 UNITS TABS, Take 1,000 Units by mouth daily, Disp: 30 tablet, Rfl:      Skin Protectants, Misc. (CALAZIME SKIN PROTECTANT) PSTE, Apply 1 Application topically daily, Disp: 1 Tube, Rfl:      tamsulosin (FLOMAX) 0.4 MG capsule, Take 2 capsules (0.8 mg) by mouth At Bedtime, Disp: 60 capsule, Rfl:      nitroglycerin (NITROSTAT) 0.4 MG sublingual tablet, For chest pain place 1 tablet under the tongue every 5 minutes for 3 doses. If symptoms persist 5 minutes after 1st dose call 911., Disp: 25 tablet, Rfl:      order for DME, Equipment being ordered: DH2 shoe, Disp: 1 Device, Rfl: 0     Amoxicillin not on this list , pt did not bring updated list.  [facility] still supplying meds to him so he thinks he's getting it but he's not sure    Allergies   Allergen Reactions     Blood Transfusion Related (Informational Only) Other (See Comments)     Patient has a history of a clinically significant antibody against RBC antigens.  A delay in compatible RBCs may occur.    PMH, SOC HIST, FAM HIST, PROBLEM LIST:  All reviewed in EPIC.    OBJECTIVE:  /66  Pulse 67  Ht 1.829 m (6')    Imaging:  AP Lat T spine 5/15/17  1. Stable postsurgical changes of attempted fusion in the thoracic  spine with instrumentation noted posteriorly from T7-T12, with  interbody fusion device at T9-T10, in area of previously noted  osteomyelitis.  2. Mild to moderate anterolisthesis of L5 in relation to S1.  I see a hint of haloing around the upper screws. I reviewed these with the patient.    EXAM:  Well developed well nourished male found seated comfortably in wheelchair.  No apparent distress. He looks a little pale and tired today.  He is accompanied by adult son.  A&O X3.  Mood and  affect WNL. Language and fund of knowledge intact.  Is unable to sit and rise independently.   He has a nicely healed incision..  Strength: 5-/5 in bilateral upper extremities   ( Note: All groups improved by about 1 point since discharge, but no real improvement since last visit a month ago.)  HF  KE  DF  EHL  PF   R  3/5 4/5 4/5 4/5 4/5  L  3/5 4/5 4/5 4/5 4/5    Assessment:  1. Osteomyelitis of thoracic spine (H)    2. Post-operative state    3.      Tad Manning is doing well and made good gains after his thoracic decompression for abscess.  His imaging is suspicious for halo-ing around the upper screws.  We'll watch this closely, we can't really dial his activity level back as he's already wheel chair bound.  A brace isn't likely to help in the structural thoracic spine. I think this will resolve itself with time. His strength was returning well but he's still not standing for more than a few seconds at a time.  He remains on ABX and is seeing ID next week.     PLAN:   We discussed activities.  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    We discussed follow up  *   Please bring an updated list of your meds to each clinic visit.  *  Return to clinic in 2 months with Dr Trent.  Imaging for that visit: Plain XR and standing scoli films. If they can't be done standing then we don't have to do them.  *  All the patient's questions have been answered and they demonstrate good understanding of the above.    *   The patient has our contact information and is aware that he should call if he has questions comments or concerns.     We appreciate the opportunity to be of service in the care of this pleasant patient.  Please do call if there is anything more we can do    Marge Richards PA-C  [facility]  Department of Neurosurgery  Phone: [phone]  Fax: [phone]

## 2017-05-15 NOTE — LETTER
5/15/2017       RE: Tad Manning  [address]   [facility] [address]     Dear Colleague,    Thank you for referring your patient, Tad Manning, to the [facility] NEUROSURGERY at [facility]. Please see a copy of my visit note below.      Neurosurgery clinic post   Date of visit:   5/15/2017    Procedure:     DOS 2/12/17 Dr Trent   DIAGNOSIS:   1. Thoracic spine instability.   2. Osteomyelitis and discitis T9-T10 s/p laminectomy.   PROCEDURES PERFORMED:   1. T7-T12 posterior segmental instrumentation.   2. Transpedicular, transforaminal right-sided T9-T10 interbody fusion.   3. Posterior arthrodesis T7-T12.  4. Use of intraoperative neuro-navigation.   5. Use of intraoperative O-arm.   6. Use of intraoperative cell saver.   7. Use of allograft bone.     OPERATIVE INDICATIONS: Mr. Tad Manning is a 76-year-old male who presented to the Neurosurgery Service at the [facility] with concerns of loss of ambulation over days. He had fallen in the early part of 02/2017 and had weakness in bilateral extremities with difficulty ambulation which progressed to complete inability to ambulate. On imaging of his spine, he was found to have an infection spondylitis at T9-T10 with a paraspinal abscess. For spinal cord compression, he underwent a T9-T10 laminectomy and partial T9 transpedicular disk space abscess removal on 02/09/2017. He subsequently was started on antibiotics and was followed by Infectious Disease team. For concerns of thoracic instability, he was then electively brought to the operating room today on 02/12/2017 for posterior stabilization.     DOS: 02/09/2017  Dr Trent  DIAGNOSIS: Epidural abscess, osteomyelitis.   PROCEDURES PERFORMED:   1. T9-T10 laminectomy.   2. T9 transpedicular left-sided transpedicular disk space abscess evacuation.       INDICATION FOR PROCEDURE: Mr. Tad Manning is a 76-year-old male with  history of coronary artery disease, CLL in remission,  diabetes, hypertension, peripheral vascular disease, on aspirin and Plavix, who has had progressive lower extremity weakness from a few days prior. He now reports that he is minimally to move his legs. An MRI was done which showed severe T9-10 cord impingement from epidural abscess. For these reasons, he was brought to the operating room for evacuation.     HPI:   He is now about 3 months status post the above procedures.   The procedures were without incident.  After the fusion  he was admitted to the floor for post-operative cares. He continued to progress with therapies, his strength improved to antigravity in lower extremities and he was able to stand upright. It was recommended he discharge to acute rehabilitation for further rehabilitation cares, and was therefore discharge in stable condition.   Exam upon discharge  Mental Status: alert and oriented x3   Cranial Nerves: grossly normal   Sensory: absent to light touch in toes and plantar aspects of feet b/l; also diminished to LT at finger tips b/l   Strength: 4+/5 in bilateral upper extremities   HF  KE  DF  EHL  PF   R  1/5 2/5 3/5 3/5 4/5  L  1/5 3/5 3/5 3/5 4/5   Reflexes: 1+ at patellar and absent at Achilles b/l  He's now been discharged from acute inpatient rehab to TCU on 4/8/17.  He remains in the TCU and he continues to make gains but not as fast anymore. He has minimal pain.  He presents for routine 12 week check.    STATUS REPORT  WORK:         Position: Retired  ACTIVITY:     Has been following activity restrictions and working with PT   MEDS:                  Pain         Tylenol rarely         NSAIDS   None    FU:                ID:   Dr Max.  Seeing again next week.  Recommended Amoxicillin for  X3 months total, has 2 months to go.    Patient Supplied Answers To the [facility] Pain Questionnaire  [facility] Pain -  Patient Entered Questionnaire/Answers 5/15/2017   How would you describe the pain? other    Which of the following improve or reduce your pain?  lying down   What number best describes your LOWEST pain in past 24 hours:  0 = No pain  to  10 = Worst pain imaginable -   What number best describes your WORST pain in past 24 hours:  0 = No pain  to  10 = Worst pain imaginable -   Have you tried treating your pain with medication?  No   Are you currently taking medications for your pain? No       Current Outpatient Prescriptions:      furosemide (LASIX) 40 MG tablet, Take 40 mg by mouth daily If weight goes up 2.5 lbs, pt has to take another pill in the PM., Disp: , Rfl:      nystatin (MYCOSTATIN) cream, Apply topically 2 times daily, Disp: , Rfl:      acetaminophen (TYLENOL) 325 MG tablet, Take 2 tablets (650 mg) by mouth every 4 hours as needed for other (surgical pain), Disp: 100 tablet, Rfl: 0     aspirin 325 MG tablet, Take 1 tablet (325 mg) by mouth daily, Disp: 120 tablet, Rfl: 0     atorvastatin (LIPITOR) 40 MG tablet, Take 1 tablet (40 mg) by mouth daily, Disp: 30 tablet, Rfl: 1     clopidogrel (PLAVIX) 75 MG tablet, Take 1 tablet (75 mg) by mouth daily, Disp: 30 tablet, Rfl:      insulin glargine (LANTUS) 100 UNIT/ML injection, Inject 22 Units Subcutaneous every morning (before breakfast), Disp: 30 mL, Rfl: 0     lisinopril (PRINIVIL/ZESTRIL) 10 MG tablet, Take 1 tablet (10 mg) by mouth daily (Patient taking differently: Take 5 mg by mouth daily ), Disp: 30 tablet, Rfl:      melatonin 3 MG tablet, Take 1 tablet (3 mg) by mouth nightly as needed, Disp: , Rfl:      metFORMIN (GLUCOPHAGE) 500 MG tablet, Take 1 tablet (500 mg) by mouth 2 times daily (with meals), Disp: 60 tablet, Rfl:      metoprolol (LOPRESSOR) 25 MG tablet, Take 3 tablets (75 mg) by mouth 2 times daily, Disp: 60 tablet, Rfl:      mirtazapine (REMERON) 15 MG tablet, Take 1 tablet (15 mg) by mouth At Bedtime, Disp: 60 tablet, Rfl:      polyethylene glycol (MIRALAX/GLYCOLAX) Packet, Take 17 g by mouth daily as needed for constipation,  Disp: 7 packet, Rfl: 0     ranitidine (ZANTAC) 150 MG tablet, Take 1 tablet (150 mg) by mouth 2 times daily, Disp: 60 tablet, Rfl:      potassium chloride SA (K-DUR/KLOR-CON M) 20 MEQ CR tablet, Take 1 tablet (20 mEq) by mouth daily, Disp: 90 tablet, Rfl:      senna-docusate (SENOKOT-S;PERICOLACE) 8.6-50 MG per tablet, Take 1-2 tablets by mouth 2 times daily as needed (constipation ), Disp: 100 tablet, Rfl: 0     benzonatate (TESSALON) 100 MG capsule, Take 1 capsule (100 mg) by mouth 3 times daily as needed for cough, Disp: 42 capsule, Rfl:      cholecalciferol 1000 UNITS TABS, Take 1,000 Units by mouth daily, Disp: 30 tablet, Rfl:      Skin Protectants, Misc. (CALAZIME SKIN PROTECTANT) PSTE, Apply 1 Application topically daily, Disp: 1 Tube, Rfl:      tamsulosin (FLOMAX) 0.4 MG capsule, Take 2 capsules (0.8 mg) by mouth At Bedtime, Disp: 60 capsule, Rfl:      nitroglycerin (NITROSTAT) 0.4 MG sublingual tablet, For chest pain place 1 tablet under the tongue every 5 minutes for 3 doses. If symptoms persist 5 minutes after 1st dose call 911., Disp: 25 tablet, Rfl:      order for DME, Equipment being ordered: DH2 shoe, Disp: 1 Device, Rfl: 0     Amoxicillin not on this list , pt did not bring updated list.  [facility] still supplying meds to him so he thinks he's getting it but he's not sure    Allergies   Allergen Reactions     Blood Transfusion Related (Informational Only) Other (See Comments)     Patient has a history of a clinically significant antibody against RBC antigens.  A delay in compatible RBCs may occur.    PMH, SOC HIST, FAM HIST, PROBLEM LIST:  All reviewed in EPIC.    OBJECTIVE:  /66  Pulse 67  Ht 1.829 m (6')    Imaging:  AP Lat T spine 5/15/17  1. Stable postsurgical changes of attempted fusion in the thoracic  spine with instrumentation noted posteriorly from T7-T12, with  interbody fusion device at T9-T10, in area of previously noted  osteomyelitis.  2. Mild to moderate anterolisthesis of  L5 in relation to S1.  I see a hint of haloing around the upper screws. I reviewed these with the patient.    EXAM:  Well developed well nourished male found seated comfortably in wheelchair.  No apparent distress. He looks a little pale and tired today.  He is accompanied by adult son.  A&O X3.  Mood and affect WNL. Language and fund of knowledge intact.  Is unable to sit and rise independently.   He has a nicely healed incision..  Strength: 5-/5 in bilateral upper extremities   ( Note: All groups improved by about 1 point since discharge, but no real improvement since last visit a month ago.)  HF  KE  DF  EHL  PF   R  3/5 4/5 4/5 4/5 4/5  L  3/5 4/5 4/5 4/5 4/5    Assessment:  1. Osteomyelitis of thoracic spine (H)    2. Post-operative state    3.      Tad Manning is doing well and made good gains after his thoracic decompression for abscess.  His imaging is suspicious for halo-ing around the upper screws.  We'll watch this closely, we can't really dial his activity level back as he's already wheel chair bound.  A brace isn't likely to help in the structural thoracic spine. I think this will resolve itself with time. His strength was returning well but he's still not standing for more than a few seconds at a time.  He remains on ABX and is seeing ID next week.     PLAN:   We discussed activities.  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    We discussed follow up  *   Please bring an updated list of your meds to each clinic visit.  *  Return to clinic in 2 months with Dr Trent.  Imaging for that visit: Plain XR and standing scoli films. If they can't be done standing then we don't have to do them.  *  All the patient's questions have been answered and they demonstrate good understanding of the above.    *   The patient has our contact information and is aware that he should call if he has questions comments or concerns.     We appreciate the opportunity to  be of service in the care of this pleasant patient.  Please do call if there is anything more we can do    Marge Richards PA-C  [facility]  Department of Neurosurgery  Phone: [phone]  Fax: [phone]

## 2017-05-25 ENCOUNTER — OFFICE VISIT (OUTPATIENT)
Dept: INFECTIOUS DISEASES | Facility: CLINIC | Age: 77
End: 2017-05-25
Attending: INTERNAL MEDICINE
Payer: MEDICARE

## 2017-05-25 ENCOUNTER — OFFICE VISIT (OUTPATIENT)
Dept: PHARMACY | Facility: CLINIC | Age: 77
End: 2017-05-25
Payer: COMMERCIAL

## 2017-05-25 VITALS
SYSTOLIC BLOOD PRESSURE: 93 MMHG | HEART RATE: 53 BPM | TEMPERATURE: 98 F | DIASTOLIC BLOOD PRESSURE: 56 MMHG | WEIGHT: 195 LBS | HEIGHT: 72 IN | BODY MASS INDEX: 26.41 KG/M2

## 2017-05-25 DIAGNOSIS — M46.20 OSTEOMYELITIS OF VERTEBRA (H): ICD-10-CM

## 2017-05-25 DIAGNOSIS — T50.905A MEDICATION SIDE EFFECT, INITIAL ENCOUNTER: Primary | ICD-10-CM

## 2017-05-25 DIAGNOSIS — L60.3 DYSTROPHIC NAIL: ICD-10-CM

## 2017-05-25 DIAGNOSIS — R05.9 COUGH: ICD-10-CM

## 2017-05-25 DIAGNOSIS — M46.20 OSTEOMYELITIS OF VERTEBRA (H): Primary | ICD-10-CM

## 2017-05-25 DIAGNOSIS — I50.22 CHRONIC SYSTOLIC HEART FAILURE (H): ICD-10-CM

## 2017-05-25 LAB
ANION GAP SERPL CALCULATED.3IONS-SCNC: 9 MMOL/L (ref 3–14)
BUN SERPL-MCNC: 18 MG/DL (ref 7–30)
CALCIUM SERPL-MCNC: 8.4 MG/DL (ref 8.5–10.1)
CHLORIDE SERPL-SCNC: 100 MMOL/L (ref 94–109)
CO2 SERPL-SCNC: 27 MMOL/L (ref 20–32)
CREAT SERPL-MCNC: 0.64 MG/DL (ref 0.66–1.25)
CRP SERPL-MCNC: 6.4 MG/L (ref 0–8)
ERYTHROCYTE [DISTWIDTH] IN BLOOD BY AUTOMATED COUNT: 19 % (ref 10–15)
GFR SERPL CREATININE-BSD FRML MDRD: >90 ML/MIN/1.7M2
GLUCOSE SERPL-MCNC: 103 MG/DL (ref 70–99)
HCT VFR BLD AUTO: 36.5 % (ref 40–53)
HGB BLD-MCNC: 11 G/DL (ref 13.3–17.7)
MCH RBC QN AUTO: 27.7 PG (ref 26.5–33)
MCHC RBC AUTO-ENTMCNC: 30.1 G/DL (ref 31.5–36.5)
MCV RBC AUTO: 92 FL (ref 78–100)
NT-PROBNP SERPL-MCNC: ABNORMAL PG/ML (ref 0–125)
PLATELET # BLD AUTO: 152 10E9/L (ref 150–450)
POTASSIUM SERPL-SCNC: 4.2 MMOL/L (ref 3.4–5.3)
RBC # BLD AUTO: 3.97 10E12/L (ref 4.4–5.9)
SODIUM SERPL-SCNC: 136 MMOL/L (ref 133–144)
WBC # BLD AUTO: 6.7 10E9/L (ref 4–11)

## 2017-05-25 PROCEDURE — 86140 C-REACTIVE PROTEIN: CPT | Performed by: INTERNAL MEDICINE

## 2017-05-25 PROCEDURE — 99207 ZZC NO CHARGE LOS: CPT | Performed by: PHARMACIST

## 2017-05-25 PROCEDURE — 83880 ASSAY OF NATRIURETIC PEPTIDE: CPT | Performed by: INTERNAL MEDICINE

## 2017-05-25 PROCEDURE — 85027 COMPLETE CBC AUTOMATED: CPT | Performed by: INTERNAL MEDICINE

## 2017-05-25 PROCEDURE — 36415 COLL VENOUS BLD VENIPUNCTURE: CPT | Performed by: INTERNAL MEDICINE

## 2017-05-25 PROCEDURE — 99212 OFFICE O/P EST SF 10 MIN: CPT | Mod: ZF

## 2017-05-25 PROCEDURE — 80048 BASIC METABOLIC PNL TOTAL CA: CPT | Performed by: INTERNAL MEDICINE

## 2017-05-25 ASSESSMENT — PAIN SCALES - GENERAL: PAINLEVEL: NO PAIN (0)

## 2017-05-25 NOTE — PATIENT INSTRUCTIONS
1. Labs and chest x-ray today  2. Depending on x-ray and lab results, may consider course of antibiotics for bronchitis or pneumonia  3. Continue amoxicillin for at least another 6 weeks  4. I will put in a consult for a pharmacy evaluation of your medications regarding confusion and many medications  5. See if your previous podiatrist can see you again. If you run into trouble getting nail fungus treated, let my clinic know  6. Return to clinic in 6 weeks.

## 2017-05-25 NOTE — Clinical Note
5/25/2017       RE: Tad Manning  [address]     [facility] [address]     Dear Colleague,    Thank you for referring your patient, Tad Manning, to the [facility] AND INFECTIOUS DISEASES [facility]. Please see a copy of my visit note below.    [facility] Follow-Up Visit  5/25/2017     Chief Complaint: Vertebral osteomyelitis     HPI:  Mr. Manning is a 77 yo gentleman with PMH CLL in remission, DMII, PVD admitted as transfer 2/9/2017 with thoracic back pain and LE weakness - found to have T9-10 discitis with overlying epidural abscess, now s/p laminectomy/abscess evacuation with intraoperative cultures and  blood cultures showing Streptococcus mitis (oral himanshu). He then also had late growth (day 6) on one operative cultures of mixed anaerobes as well.  TTE was negative for endocarditis. He was started on ceftriaxone Q12H with plans for 6-12 weeks of treatment. He has now had 9 weeks and is slowly regaining his leg strength with significant improvement since last visit, but still unable to stand or walk without heavy assistance. He had increasing CRP just prior to discharge from the [facility] to a TCU without a clear cause so we added metronidazole to his ceftriaxone. He has not noted any side effects from this. He did have a brief hospitalization for pulmonary edema since his last visit with me.  No problems with PICC line. No new fevers. No new rashes. Incision healed well and is not painful. No significant diarrhea.     5/25/17 Update: ***    ROS: ROS as noted above. He reports difficulty with appetite and weight loss, but reports that this is in part due to a loss of taste sensation which predates his infection or any antibiotics and started near the end of 2016.     Past Medical History:  Past Medical History:   Diagnosis Date     Arthritis      ASCVD (arteriosclerotic cardiovascular disease)       BMI 30.0-30.9,adult      BPH (benign prostatic hypertrophy)      Cellulitis      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)      Coronary artery disease     triple bypass 1995     Diabetes mellitus (H)      Diabetic polyneuropathy (H)      History of blood transfusion      Hyperlipidaemia      Hypertension      Malignant neoplasm (H)     CLL     Noninflammatory pericardial effusion      Osteomyelitis of left foot (H)      PVD (peripheral vascular disease) (H)      Sebaceous cyst      Medications:  Current Outpatient Prescriptions   Medication Sig Dispense Refill     furosemide (LASIX) 40 MG tablet Take 40 mg by mouth daily If weight goes up 2.5 lbs, pt has to take another pill in the PM.       nystatin (MYCOSTATIN) cream Apply topically 2 times daily       acetaminophen (TYLENOL) 325 MG tablet Take 2 tablets (650 mg) by mouth every 4 hours as needed for other (surgical pain) 100 tablet 0     aspirin 325 MG tablet Take 1 tablet (325 mg) by mouth daily 120 tablet 0     atorvastatin (LIPITOR) 40 MG tablet Take 1 tablet (40 mg) by mouth daily 30 tablet 1     clopidogrel (PLAVIX) 75 MG tablet Take 1 tablet (75 mg) by mouth daily 30 tablet      insulin glargine (LANTUS) 100 UNIT/ML injection Inject 22 Units Subcutaneous every morning (before breakfast) (Patient taking differently: Inject 16 Units Subcutaneous every morning (before breakfast) ) 30 mL 0     lisinopril (PRINIVIL/ZESTRIL) 10 MG tablet Take 1 tablet (10 mg) by mouth daily (Patient taking differently: Take 5 mg by mouth daily ) 30 tablet      melatonin 3 MG tablet Take 1 tablet (3 mg) by mouth nightly as needed       metFORMIN (GLUCOPHAGE) 500 MG tablet Take 1 tablet (500 mg) by mouth 2 times daily (with meals) 60 tablet      metoprolol (LOPRESSOR) 25 MG tablet Take 3 tablets (75 mg) by mouth 2 times daily 60 tablet      polyethylene glycol (MIRALAX/GLYCOLAX) Packet Take 17 g by mouth daily as needed for constipation 7  packet 0     ranitidine (ZANTAC) 150 MG tablet Take 1 tablet (150 mg) by mouth 2 times daily 60 tablet      potassium chloride SA (K-DUR/KLOR-CON M) 20 MEQ CR tablet Take 1 tablet (20 mEq) by mouth daily 90 tablet      senna-docusate (SENOKOT-S;PERICOLACE) 8.6-50 MG per tablet Take 1-2 tablets by mouth 2 times daily as needed (constipation ) 100 tablet 0     benzonatate (TESSALON) 100 MG capsule Take 1 capsule (100 mg) by mouth 3 times daily as needed for cough 42 capsule      cholecalciferol 1000 UNITS TABS Take 1,000 Units by mouth daily 30 tablet      tamsulosin (FLOMAX) 0.4 MG capsule Take 2 capsules (0.8 mg) by mouth At Bedtime 60 capsule      nitroglycerin (NITROSTAT) 0.4 MG sublingual tablet For chest pain place 1 tablet under the tongue every 5 minutes for 3 doses. If symptoms persist 5 minutes after 1st dose call 911. 25 tablet      order for DME Equipment being ordered: 2 shoe 1 Device 0     Exam:  BP 93/56  Pulse 53  Temp 98  F (36.7  C) (Oral)  Ht 1.829 m (6')  Wt 88.5 kg (195 lb)  BMI 26.45 kg/m2   Gen: Alert and in no distress.   Psych: Normal affect. Alert and oriented.   HEENT: PERRL. No icterus. Oropharynx pink and moist without lesions.   Extremities: Mild peripheral edema. 3/5 right thigh flexion, 5/5 on left. 4/5 right toe flexion, 5/5 on left.   Skin: No rashes or lesions noted. PICC line without erythema or discharge.     Labs:  WBC   Date Value Ref Range Status   05/25/2017 6.7 4.0 - 11.0 10e9/L Final     CRP Inflammation   Date Value Ref Range Status   05/25/2017 6.4 0.0 - 8.0 mg/L Final   04/13/2017 7.5 0.0 - 8.0 mg/L Final   04/07/2017 17.0 (H) 0.0 - 8.0 mg/L Final   03/15/2017 81.0 (H) 0.0 - 8.0 mg/L Final   03/13/2017 74.7 (H) 0.0 - 8.0 mg/L Final      Assessment and Plan:  Mr. Manning is doing well and has had 9 weeks of ceftriaxone after surgery. He has had fluctuating CRP that does not appear to correlate with infection, and his CRP today is normal at 7.5. We will finish his  course of metronidazole for anaerobes that may or may not have been a contaminant. We will also stop his ceftriaxone but continue amoxicillin to cover streptococcus due to hardware placed early during infection.     Plan:   1. Labs and chest x-ray today  2. Depending on x-ray and lab results, may consider course of antibiotics for bronchitis or pneumonia  3. Continue amoxicillin for at least another 6 weeks  4. I will put in a consult for a pharmacy evaluation of your medications regarding confusion and many medications  5. See if your previous podiatrist can see you again. If you run into trouble getting nail fungus treated, let my clinic know  6. Return to clinic in 6 weeks.     Gillian Max MD  Infectious Diseases  [phone]      Again, thank you for allowing me to participate in the care of your patient.      Sincerely,    Gillian Max MD

## 2017-05-25 NOTE — MR AVS SNAPSHOT
After Visit Summary   5/25/2017    Tad Manning    MRN: [id number]           Patient Information     Date Of Birth          1940        Visit Information        Provider Department      5/25/2017 12:00 PM Vicki Ortega RPH [facility] and Infectious Diseases [facility]        Today's Diagnoses     Medication side effect, initial encounter    -  1       Follow-ups after your visit        Your next 10 appointments already scheduled     Jun 08, 2017 11:30 AM CDT   Lab with UC LAB   [facility]    [address] [place] [address]   [phone]            Jun 08, 2017 12:00 PM CDT   (Arrive by 11:45 AM)   CORE RETURN with OFELIA Faulkner [facility]    [address] [place] [address]   [phone]            Jul 06, 2017  9:30 AM CDT   (Arrive by 9:15 AM)   Return Visit with Gillian Max MD   [facility]    [address] [place] [phone]   [phone]            Aug 07, 2017  1:00 PM CDT   (Arrive by 12:45 PM)   Return Visit with Clarisse Valdes MD   [facility]    [address] [place] [phone]   [phone]            Sep 18, 2017  8:00 AM CDT   XR SPINE COMPLETE 2 VIEWS with UCXR1   [facility] ([facility]    [address] [place] [address]   [phone]           Please bring a list of your current medicines to your exam. (Include vitamins, minerals and over-thecounter medicines.) Leave your valuables at home.  Tell your doctor if there is a chance you may be pregnant.  You do not need to do anything special for this exam.            Sep 18, 2017  8:15 AM CDT   XR THORACIC SPINE 2  "VIEWS with UCXR1   [facility] ([facility])    [address]  1st Floor  [facility] [phone] [phone]           Please bring a list of your current medicines to your exam. (Include vitamins, minerals and over-thecounter medicines.) Leave your valuables at home.  Tell your doctor if there is a chance you may be pregnant.  You do not need to do anything special for this exam.            Sep 18, 2017  8:30 AM CDT   (Arrive by 8:15 AM)   Return Visit with Marcelo Trent MD   [facility] ([facility])    [address]  3rd Floor  [facility] [address] [phone]              Who to contact     If you have questions or need follow up information about today's clinic visit or your schedule please contact [facility] AND INFECTIOUS DISEASES [facility] directly at No information on file..  Normal or non-critical lab and imaging results will be communicated to you by Avieonhart, letter or phone within 4 business days after the clinic has received the results. If you do not hear from us within 7 days, please contact the clinic through Avieonhart or phone. If you have a critical or abnormal lab result, we will notify you by phone as soon as possible.  Submit refill requests through Forsitec or call your pharmacy and they will forward the refill request to us. Please allow 3 business days for your refill to be completed.          Additional Information About Your Visit        Avie[employer] Information     Forsitec lets you send messages to your doctor, view your test results, renew your prescriptions, schedule appointments and more. To sign up, go to [email or web address] . Click on \"Log in\" on the left side of the screen, which will take you to the Welcome page. Then click on \"Sign up Now\" on the right side of the page.     You will be asked to enter the access code listed below, as well as some personal information. " Please follow the directions to create your username and password.     Your access code is: [email or web address]  Expires: 2017 12:30 PM     Your access code will  in 90 days. If you need help or a new code, please call your [place] clinic or [phone].        Care EveryWhere ID     This is your Care EveryWhere ID. This could be used by other organizations to access your [place] medical records  [id number]         Blood Pressure from Last 3 Encounters:   17 93/56   05/15/17 112/66   17 110/69    Weight from Last 3 Encounters:   17 195 lb (88.5 kg)   17 187 lb 6.4 oz (85 kg)   17 185 lb (83.9 kg)              Today, you had the following     No orders found for display         Today's Medication Changes          These changes are accurate as of: 17 11:59 PM.  If you have any questions, ask your nurse or doctor.               These medicines have changed or have updated prescriptions.        Dose/Directions    insulin glargine 100 UNIT/ML injection   Commonly known as:  LANTUS   This may have changed:  how much to take   Used for:  Epidural abscess        Dose:  22 Units   Inject 22 Units Subcutaneous every morning (before breakfast)   Quantity:  30 mL   Refills:  0       lisinopril 10 MG tablet   Commonly known as:  PRINIVIL/ZESTRIL   This may have changed:  how much to take   Used for:  Essential hypertension        Dose:  10 mg   Take 1 tablet (10 mg) by mouth daily   Quantity:  30 tablet   Refills:  0                Primary Care Provider Office Phone # Fax #    Gene Guevara [phone]       [address] [address]        Thank you!     Thank you for choosing [facility] AND INFECTIOUS DISEASES [facility]  for your care. Our goal is always to provide you with excellent care. Hearing back from our patients is one way we can continue to improve our services. Please take a few minutes to complete the written  survey that you may receive in the mail after your visit with us. Thank you!             Your Updated Medication List - Protect others around you: Learn how to safely use, store and throw away your medicines at www.disposemymeds.org.          This list is accurate as of: 5/25/17 11:59 PM.  Always use your most recent med list.                   Brand Name Dispense Instructions for use    acetaminophen 325 MG tablet    TYLENOL    100 tablet    Take 2 tablets (650 mg) by mouth every 4 hours as needed for other (surgical pain)       aspirin 325 MG tablet     120 tablet    Take 1 tablet (325 mg) by mouth daily       atorvastatin 40 MG tablet    LIPITOR    30 tablet    Take 1 tablet (40 mg) by mouth daily       benzonatate 100 MG capsule    TESSALON    42 capsule    Take 1 capsule (100 mg) by mouth 3 times daily as needed for cough       cholecalciferol 1000 UNITS Tabs     30 tablet    Take 1,000 Units by mouth daily       clopidogrel 75 MG tablet    PLAVIX    30 tablet    Take 1 tablet (75 mg) by mouth daily       insulin glargine 100 UNIT/ML injection    LANTUS    30 mL    Inject 22 Units Subcutaneous every morning (before breakfast)       LASIX 40 MG tablet   Generic drug:  furosemide      Take 40 mg by mouth daily If weight goes up 2.5 lbs, pt has to take another pill in the PM.       lisinopril 10 MG tablet    PRINIVIL/ZESTRIL    30 tablet    Take 1 tablet (10 mg) by mouth daily       melatonin 3 MG tablet      Take 1 tablet (3 mg) by mouth nightly as needed       metFORMIN 500 MG tablet    GLUCOPHAGE    60 tablet    Take 1 tablet (500 mg) by mouth 2 times daily (with meals)       metoprolol 25 MG tablet    LOPRESSOR    60 tablet    Take 3 tablets (75 mg) by mouth 2 times daily       nitroglycerin 0.4 MG sublingual tablet    NITROSTAT    25 tablet    For chest pain place 1 tablet under the tongue every 5 minutes for 3 doses. If symptoms persist 5 minutes after 1st dose call 911.       nystatin cream    MYCOSTATIN      Apply topically 2 times daily       order for DME     1 Device    Equipment being ordered: DH2 shoe       polyethylene glycol Packet    MIRALAX/GLYCOLAX    7 packet    Take 17 g by mouth daily as needed for constipation       potassium chloride SA 20 MEQ CR tablet    K-DUR/KLOR-CON M    90 tablet    Take 1 tablet (20 mEq) by mouth daily       ranitidine 150 MG tablet    ZANTAC    60 tablet    Take 1 tablet (150 mg) by mouth 2 times daily       senna-docusate 8.6-50 MG per tablet    SENOKOT-S;PERICOLACE    100 tablet    Take 1-2 tablets by mouth 2 times daily as needed (constipation )       tamsulosin 0.4 MG capsule    FLOMAX    60 capsule    Take 2 capsules (0.8 mg) by mouth At Bedtime

## 2017-05-25 NOTE — MR AVS SNAPSHOT
After Visit Summary   5/25/2017    Tad Manning    MRN: [id number]           Patient Information     Date Of Birth          1940        Visit Information        Provider Department      5/25/2017 10:30 AM Gillian Max MD [facility] and Infectious Diseases        Today's Diagnoses     Cough    -  1    Osteomyelitis of vertebra (H)        Chronic systolic heart failure (H)          Care Instructions    1. Labs and chest x-ray today  2. Depending on x-ray and lab results, may consider course of antibiotics for bronchitis or pneumonia  3. Continue amoxicillin for at least another 6 weeks  4. I will put in a consult for a pharmacy evaluation of your medications regarding confusion and many medications  5. See if your previous podiatrist can see you again. If you run into trouble getting nail fungus treated, let my clinic know  6. Return to clinic in 6 weeks.           Follow-ups after your visit        Your next 10 appointments already scheduled     May 25, 2017 11:15 AM CDT   Lab with UC LAB    [facility] ([facility])    [address] [place] [id number] [phone]            May 25, 2017 12:15 PM CDT   (Arrive by 12:00 PM)   XR CHEST 2 VIEWS with UCXR1   [facility] ([facility])    [address] [place] [id number] [phone]           Please bring a list of your current medicines to your exam. (Include vitamins, minerals and over-thecounter medicines.) Leave your valuables at home.  Tell your doctor if there is a chance you may be pregnant.  You do not need to do anything special for this exam.            Jun 08, 2017 11:30 AM CDT   Lab with UC LAB    [facility] ([facility])    [address] [place] [id number] [phone]            Jun 08, 2017 12:00 PM CDT   (Arrive by 11:45 AM)   CORE RETURN  with OFELIA Faulkner CNP   [facility] ([facility])    [address] [place] [address]   [phone]            Jul 06, 2017  9:30 AM CDT   (Arrive by 9:15 AM)   Return Visit with Gillian Max MD   [facility] and Infectious Diseases ([facility])    [address] [place] [address]   [phone]            Aug 07, 2017  1:00 PM CDT   (Arrive by 12:45 PM)   Return Visit with Clarisse Valdes MD   [facility] ([facility])    [address] [place] [address]   [phone]            Sep 18, 2017  8:00 AM CDT   XR SPINE COMPLETE 2 VIEWS with UCXR1   [facility] ([facility])    [address] [place] [id number] [phone]           Please bring a list of your current medicines to your exam. (Include vitamins, minerals and over-thecounter medicines.) Leave your valuables at home.  Tell your doctor if there is a chance you may be pregnant.  You do not need to do anything special for this exam.            Sep 18, 2017  8:15 AM CDT   XR THORACIC SPINE 2 VIEWS with UCXR1   [facility] ([facility])    [address] [place] [id number] [phone]           Please bring a list of your current medicines to your exam. (Include vitamins, minerals and over-thecounter medicines.) Leave your valuables at home.  Tell your doctor if there is a chance you may be pregnant.  You do not need to do anything special for this exam.            Sep 18, 2017  8:30 AM CDT   (Arrive by 8:15 AM)   Return Visit with Marcelo Trent MD    [facility] ([facility])    [address] [place] [address]   [phone]              Future tests that were ordered  "for you today     Open Future Orders        Priority Expected Expires Ordered    N terminal pro BNP outpatient Routine  2018    CBC with platelets Routine  2018    CRP inflammation Routine  2018    Basic metabolic panel Routine  2018    X-ray Chest 2 vws* Routine 2017            Who to contact     If you have questions or need follow up information about today's clinic visit or your schedule please contact [facility] AND INFECTIOUS DISEASES directly at [phone].  Normal or non-critical lab and imaging results will be communicated to you by Graffiti Worldhart, letter or phone within 4 business days after the clinic has received the results. If you do not hear from us within 7 days, please contact the clinic through [employer] Worldhart or phone. If you have a critical or abnormal lab result, we will notify you by phone as soon as possible.  Submit refill requests through [employer]Ex or call your pharmacy and they will forward the refill request to us. Please allow 3 business days for your refill to be completed.          Additional Information About Your Visit        [employer]to Information     ShopEx lets you send messages to your doctor, view your test results, renew your prescriptions, schedule appointments and more. To sign up, go to [email or web address] . Click on \"Log in\" on the left side of the screen, which will take you to the Welcome page. Then click on \"Sign up Now\" on the right side of the page.     You will be asked to enter the access code listed below, as well as some personal information. Please follow the directions to create your username and password.     Your access code is: [email or web address]  Expires: 2017 12:30 PM     Your access code will  in 90 days. If you need help or a new code, please call your [place] clinic or [phone].        Care EveryWhere ID     This is your Care EveryWhere ID. This could be used by " other organizations to access your [place] medical records  [id number]        Your Vitals Were     Pulse Temperature Height BMI (Body Mass Index)          53 98  F (36.7  C) (Oral) 1.829 m (6') 26.45 kg/m2         Blood Pressure from Last 3 Encounters:   05/25/17 93/56   05/15/17 112/66   05/11/17 110/69    Weight from Last 3 Encounters:   05/25/17 88.5 kg (195 lb)   05/11/17 85 kg (187 lb 6.4 oz)   05/01/17 83.9 kg (185 lb)                 Today's Medication Changes          These changes are accurate as of: 5/25/17 10:57 AM.  If you have any questions, ask your nurse or doctor.               These medicines have changed or have updated prescriptions.        Dose/Directions    insulin glargine 100 UNIT/ML injection   Commonly known as:  LANTUS   This may have changed:  how much to take   Used for:  Epidural abscess        Dose:  22 Units   Inject 22 Units Subcutaneous every morning (before breakfast)   Quantity:  30 mL   Refills:  0       lisinopril 10 MG tablet   Commonly known as:  PRINIVIL/ZESTRIL   This may have changed:  how much to take   Used for:  Essential hypertension        Dose:  10 mg   Take 1 tablet (10 mg) by mouth daily   Quantity:  30 tablet   Refills:  0                Primary Care Provider Office Phone # Fax #    Gene Guevara [phone]       [place] [address]        Thank you!     Thank you for choosing [facility] AND INFECTIOUS DISEASES  for your care. Our goal is always to provide you with excellent care. Hearing back from our patients is one way we can continue to improve our services. Please take a few minutes to complete the written survey that you may receive in the mail after your visit with us. Thank you!             Your Updated Medication List - Protect others around you: Learn how to safely use, store and throw away your medicines at www.disposemymeds.org.          This list is accurate as of: 5/25/17  10:57 AM.  Always use your most recent med list.                   Brand Name Dispense Instructions for use    acetaminophen 325 MG tablet    TYLENOL    100 tablet    Take 2 tablets (650 mg) by mouth every 4 hours as needed for other (surgical pain)       aspirin 325 MG tablet     120 tablet    Take 1 tablet (325 mg) by mouth daily       atorvastatin 40 MG tablet    LIPITOR    30 tablet    Take 1 tablet (40 mg) by mouth daily       benzonatate 100 MG capsule    TESSALON    42 capsule    Take 1 capsule (100 mg) by mouth 3 times daily as needed for cough       cholecalciferol 1000 UNITS Tabs     30 tablet    Take 1,000 Units by mouth daily       clopidogrel 75 MG tablet    PLAVIX    30 tablet    Take 1 tablet (75 mg) by mouth daily       insulin glargine 100 UNIT/ML injection    LANTUS    30 mL    Inject 22 Units Subcutaneous every morning (before breakfast)       LASIX 40 MG tablet   Generic drug:  furosemide      Take 40 mg by mouth daily If weight goes up 2.5 lbs, pt has to take another pill in the PM.       lisinopril 10 MG tablet    PRINIVIL/ZESTRIL    30 tablet    Take 1 tablet (10 mg) by mouth daily       melatonin 3 MG tablet      Take 1 tablet (3 mg) by mouth nightly as needed       metFORMIN 500 MG tablet    GLUCOPHAGE    60 tablet    Take 1 tablet (500 mg) by mouth 2 times daily (with meals)       metoprolol 25 MG tablet    LOPRESSOR    60 tablet    Take 3 tablets (75 mg) by mouth 2 times daily       nitroglycerin 0.4 MG sublingual tablet    NITROSTAT    25 tablet    For chest pain place 1 tablet under the tongue every 5 minutes for 3 doses. If symptoms persist 5 minutes after 1st dose call 911.       nystatin cream    MYCOSTATIN     Apply topically 2 times daily       order for DME     1 Device    Equipment being ordered: DH2 shoe       polyethylene glycol Packet    MIRALAX/GLYCOLAX    7 packet    Take 17 g by mouth daily as needed for constipation       potassium chloride SA 20 MEQ CR tablet     K-DUR/KLOR-CON M    90 tablet    Take 1 tablet (20 mEq) by mouth daily       ranitidine 150 MG tablet    ZANTAC    60 tablet    Take 1 tablet (150 mg) by mouth 2 times daily       senna-docusate 8.6-50 MG per tablet    SENOKOT-S;PERICOLACE    100 tablet    Take 1-2 tablets by mouth 2 times daily as needed (constipation )       tamsulosin 0.4 MG capsule    FLOMAX    60 capsule    Take 2 capsules (0.8 mg) by mouth At Bedtime

## 2017-05-26 NOTE — PROGRESS NOTES
[facility] Follow-Up Visit  5/25/2017     Chief Complaint: Vertebral osteomyelitis     HPI:  Mr. Manning is a 77 yo gentleman with PMH CLL in remission, DMII, PVD admitted as transfer 2/9/2017 with thoracic back pain and LE weakness - found to have T9-10 discitis with overlying epidural abscess, now s/p laminectomy/abscess evacuation with intraoperative cultures and  blood cultures showing Streptococcus mitis (oral himanshu). He then also had late growth (day 6) on one operative cultures of mixed anaerobes as well.  TTE was negative for endocarditis. He was started on ceftriaxone Q12H with plans for 6-12 weeks of treatment. He has now had 9 weeks and is slowly regaining his leg strength with significant improvement since last visit, but still unable to stand or walk without heavy assistance. He had increasing CRP just prior to discharge from the [place] [facility] to a TCU without a clear cause so we added metronidazole to his ceftriaxone. He completed 9 weeks of IV ceftriaxone and 4 weeks of metronidazole and has been on suppressive amoxicillin for the past month due to hardware placed early in the infection.     5/25/17 Update: Mr. Manning is doing well on amoxicillin, though he has been slow to regain mobility. He reports an ongoing cough for the last several weeks. No fevers. He also has had lower extremity swelling and has had his diuretics titrated due to known heart failure. Finally, he has thickening and sloughing of several of his finger and toenails. This is new for him.     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       nystatin (MYCOSTATIN) cream Apply topically 2 times daily       acetaminophen (TYLENOL) 325 MG tablet Take 2 tablets (650 mg) by mouth every 4 hours as needed for other (surgical pain) 100 tablet 0     aspirin 325 MG tablet Take 1 tablet (325 mg) by mouth daily 120 tablet 0     atorvastatin (LIPITOR) 40 MG tablet Take 1 tablet (40 mg) by mouth daily 30 tablet 1     clopidogrel (PLAVIX) 75 MG tablet Take 1 tablet (75 mg) by mouth daily 30 tablet      insulin glargine (LANTUS) 100 UNIT/ML injection Inject 22 Units Subcutaneous every morning (before breakfast) (Patient taking differently: Inject 16 Units Subcutaneous every morning (before breakfast) ) 30 mL 0     lisinopril (PRINIVIL/ZESTRIL) 10 MG tablet Take 1 tablet (10 mg) by mouth daily (Patient taking differently: Take 5 mg by mouth daily ) 30 tablet      melatonin 3 MG tablet Take 1 tablet (3 mg) by mouth nightly as needed       metFORMIN (GLUCOPHAGE) 500 MG tablet Take 1 tablet (500 mg) by mouth 2 times daily (with meals) 60 tablet      metoprolol (LOPRESSOR) 25 MG tablet Take 3 tablets (75 mg) by mouth 2 times daily 60 tablet      polyethylene glycol (MIRALAX/GLYCOLAX) Packet Take 17 g by mouth daily as needed for constipation 7 packet 0     ranitidine (ZANTAC) 150 MG tablet Take 1 tablet (150 mg) by mouth 2 times daily 60 tablet      potassium chloride SA (K-DUR/KLOR-CON M) 20 MEQ CR tablet Take 1 tablet (20  mEq) by mouth daily 90 tablet      senna-docusate (SENOKOT-S;PERICOLACE) 8.6-50 MG per tablet Take 1-2 tablets by mouth 2 times daily as needed (constipation ) 100 tablet 0     benzonatate (TESSALON) 100 MG capsule Take 1 capsule (100 mg) by mouth 3 times daily as needed for cough 42 capsule      cholecalciferol 1000 UNITS TABS Take 1,000 Units by mouth daily 30 tablet      tamsulosin (FLOMAX) 0.4 MG capsule Take 2 capsules (0.8 mg) by mouth At Bedtime 60 capsule      nitroglycerin (NITROSTAT) 0.4 MG sublingual tablet For chest pain place 1 tablet under the tongue every 5 minutes for 3 doses. If symptoms persist 5 minutes after 1st dose call 911. 26 tablet      order for DME Equipment being ordered: DH2 shoe 1 Device 0     Exam:  BP 93/56  Pulse 53  Temp 98  F (36.7  C) (Oral)  Ht 1.829 m (6')  Wt 88.5 kg (195 lb)  BMI 26.45 kg/m2   Gen: Alert and in no distress.   Psych: Normal affect. Alert and oriented.   HEENT: PERRL. No icterus. Oropharynx pink and moist without lesions.   Extremities: Mild peripheral edema. Thumbnail able to be nearly completely lifted off. No purulence or erythema.   Skin: No rashes or lesions noted.     Labs:  WBC   Date Value Ref Range Status   2017 6.7 4.0 - 11.0 10e9/L Final     CRP Inflammation   Date Value Ref Range Status   2017 6.4 0.0 - 8.0 mg/L Final   2017 7.5 0.0 - 8.0 mg/L Final   2017 17.0 (H) 0.0 - 8.0 mg/L Final   03/15/2017 81.0 (H) 0.0 - 8.0 mg/L Final   2017 74.7 (H) 0.0 - 8.0 mg/L Final        Imagin17 CXR:   Impression:   1. Left pleural effusion with overlying lung base and retrocardiac  opacities which may be due to infection, aspiration, or atelectasis.  2. Right lung base opacity, likely atelectasis.  3. Pulmonary edema.    Assessment and Plan:  Mr. Manning is doing well after 9 weeks of ceftriaxone and 4 weeks of metronidazole (late growth of mixed anaerobes on one culture). He had normalization of his CRP prior to  stopping IV therapy. He continues on amoxicillin for suppression due to hardware placed during infection. We may be able to stop amoxicillin at his next visit as strep mitis is not usually a robust biofilm former.     Plan:   1. Labs and chest x-ray today  2. Depending on x-ray and lab results, may consider course of antibiotics for bronchitis or pneumonia  3. Continue amoxicillin for at least another 6 weeks  4. See if your previous podiatrist can see you again for dystrophic nails. If you run into trouble getting nail fungus treated, let my clinic know.  5. Return to clinic in 6 weeks.     6/2/17 Addendum:   Labs returned with normal WBC and CRP of 6. His CXR showed a left pleural effusion and opacities that could be atelectasis or infection. I spoke with Mr. Manning's relative today and she reports that he is unchanged. No fevers, no worsening cough. He has cardiology follow-up next week. We discussed the pros/cons of empiric pneumonia treatment and decided to hold off for now. He will see cardiology next week and I'll touch base with them to re-evaluate at that time.     Gillian Max MD  Infectious Diseases  [phone]

## 2017-05-26 NOTE — PROGRESS NOTES
"Clinical Consult:                                                    Tad Manning is a 76 year old male coming in for a clinical pharmacist consult.  He was referred to me from Dr. Max. Tad is accompanied today by his bother, Seferino and sister in law, Joyce.    Reason for Consult: Questions.    Discussion: Tad reports he wants to know what each medication is for, so he will know if it is an essential medication. Wants to be sure there are no dangerous drug interactions. Reports is having some insomnia and wants more information about Melatonin. Wants to know if any of his medications can cause confusion.     Plan:  1. Reviewed what each medication does and why he is taking it.  2. There are no \"major\" drug interactions found with current med list.  3. Reviewed how Melatonin works. Suggested pt take it 4 to 5 hours before he wants to sleep. Pt is asked to talk to his doctor about a possible increase in dose.   4. Reviewed with pt that having blood sugars that are too high or too low can cause confusion.  5. After pt left, I did a review of which medications come with the potential side effect of \"confusion\". I was able to call [facility] and talk to the nurse there. She will give Tad a message that the following medications come with this possible side effect, and he is to discuss this with his doctor.    Atorvastatin: May cause cognitive impairment.   Ranitidine: May cause confusion.   Benzonatate: May cause confusion.      Vicki Ortega, [facility] Pharmacist.   [phone]    Note faxed to PCP, Dr. Guevara at: [phone]      "

## 2017-06-06 ENCOUNTER — PRE VISIT (OUTPATIENT)
Dept: CARDIOLOGY | Facility: CLINIC | Age: 77
End: 2017-06-06

## 2017-06-06 ENCOUNTER — TELEPHONE (OUTPATIENT)
Dept: PHARMACY | Facility: CLINIC | Age: 77
End: 2017-06-06

## 2017-06-06 DIAGNOSIS — I50.22 CHRONIC SYSTOLIC HEART FAILURE (H): Primary | ICD-10-CM

## 2017-06-06 NOTE — TELEPHONE ENCOUNTER
"Tad called because his phone is working now and he said I left him a message two weeks ago and wanted to know what I wanted. Pt reports he is no longer having confusion.     5. After pt left, I did a review of which medications come with the potential side effect of \"confusion\". I was able to call [facility] and talk to the nurse there. She will give Tad a message that the following medications come with this possible side effect, and he is to discuss this with his doctor.     Atorvastatin: May cause cognitive impairment.   Ranitidine: May cause confusion.   Benzonatate: May cause confusion.        Vicki Ortega, [facility] Pharmacist.   [phone]     Note faxed to PCP, Dr. Guevara at: [phone]  "

## 2017-06-08 ENCOUNTER — OFFICE VISIT (OUTPATIENT)
Dept: CARDIOLOGY | Facility: CLINIC | Age: 77
End: 2017-06-08
Attending: NURSE PRACTITIONER
Payer: MEDICARE

## 2017-06-08 VITALS
WEIGHT: 193.2 LBS | HEART RATE: 92 BPM | SYSTOLIC BLOOD PRESSURE: 97 MMHG | DIASTOLIC BLOOD PRESSURE: 48 MMHG | BODY MASS INDEX: 26.17 KG/M2 | HEIGHT: 72 IN | OXYGEN SATURATION: 100 %

## 2017-06-08 DIAGNOSIS — I50.22 CHRONIC SYSTOLIC HEART FAILURE (H): ICD-10-CM

## 2017-06-08 DIAGNOSIS — I50.22 CHRONIC SYSTOLIC CONGESTIVE HEART FAILURE (H): Primary | ICD-10-CM

## 2017-06-08 LAB
ANION GAP SERPL CALCULATED.3IONS-SCNC: 9 MMOL/L (ref 3–14)
BUN SERPL-MCNC: 17 MG/DL (ref 7–30)
CALCIUM SERPL-MCNC: 8.6 MG/DL (ref 8.5–10.1)
CHLORIDE SERPL-SCNC: 100 MMOL/L (ref 94–109)
CO2 SERPL-SCNC: 29 MMOL/L (ref 20–32)
CREAT SERPL-MCNC: 0.76 MG/DL (ref 0.66–1.25)
GFR SERPL CREATININE-BSD FRML MDRD: ABNORMAL ML/MIN/1.7M2
GLUCOSE SERPL-MCNC: 126 MG/DL (ref 70–99)
POTASSIUM SERPL-SCNC: 3.8 MMOL/L (ref 3.4–5.3)
SODIUM SERPL-SCNC: 138 MMOL/L (ref 133–144)

## 2017-06-08 PROCEDURE — 99214 OFFICE O/P EST MOD 30 MIN: CPT | Mod: ZP | Performed by: NURSE PRACTITIONER

## 2017-06-08 PROCEDURE — 80048 BASIC METABOLIC PNL TOTAL CA: CPT | Performed by: NURSE PRACTITIONER

## 2017-06-08 PROCEDURE — 36415 COLL VENOUS BLD VENIPUNCTURE: CPT | Performed by: NURSE PRACTITIONER

## 2017-06-08 PROCEDURE — 99212 OFFICE O/P EST SF 10 MIN: CPT | Mod: ZF

## 2017-06-08 RX ORDER — SPIRONOLACTONE 25 MG/1
12.5 TABLET ORAL DAILY
Qty: 45 TABLET | Refills: 3 | COMMUNITY
Start: 2017-06-08 | End: 2018-05-02

## 2017-06-08 ASSESSMENT — PAIN SCALES - GENERAL: PAINLEVEL: NO PAIN (0)

## 2017-06-08 NOTE — PATIENT INSTRUCTIONS
1. Start spironolactone 12.5 mg every day  2.  Discontinue Potassium supplement  3. Continue to monitor weight  4. Continue exercises  5.  Check Basic metabolic panel in 10-14 days to f/u on starting spironolactone, send results to our clinic.  6.  Clinic 4 weeks.    Component      Latest Ref Rng & Units 6/8/2017   Sodium      133 - 144 mmol/L 138   Potassium      3.4 - 5.3 mmol/L 3.8   Chloride      94 - 109 mmol/L 100   Carbon Dioxide      20 - 32 mmol/L 29   Anion Gap      3 - 14 mmol/L 9   Glucose      70 - 99 mg/dL 126 (H)   Urea Nitrogen      7 - 30 mg/dL 17   Creatinine      0.66 - 1.25 mg/dL 0.76   GFR Estimate      >60 mL/min/1.7m2 >90 . . .   GFR Estimate If Black      >60 mL/min/1.7m2 >90 . . .   Calcium      8.5 - 10.1 mg/dL 8.6       It was a pleasure to see you in clinic today.  If you have questions please call:  Nicole Hollingsworth RN (Sam)  CORE RN  [facility], Cardiology  Phone:  [phone]; option #1, then option #3    Clinics and Surgery Center  [address] [place] [address]  Cardiology Clinic [address]  [place] [address]

## 2017-06-08 NOTE — NURSING NOTE
Chief Complaint   Patient presents with     Follow Up For     Return CORE; 76yr old male wih a h/o CAD, Aflutter and chronic systolic HF secondary to ICM presenting for HF follow-up with labs prior.     Med Reconcile: Reviewed and verified all current medications with the patient. The updated medication list was printed and given to the patient.  Return Appointment: Patient given instructions regarding scheduling next clinic visit. Patient demonstrated understanding of this information and agreed to call with further questions or concerns.  Patient stated he understood all health information given and agreed to call with further questions or concerns.

## 2017-06-08 NOTE — MR AVS SNAPSHOT
After Visit Summary   6/8/2017    Tad Manning    MRN: [id number]           Patient Information     Date Of Birth          1940        Visit Information        Provider Department      6/8/2017 12:00 PM Magnolia Cifuentes APRN [facility]        Today's Diagnoses     Chronic systolic congestive heart failure (H)    -  1      Care Instructions    1. Start spironolactone 12.5 mg every day  2.  Discontinue Potassium supplement  3. Continue to monitor weight  4. Continue exercises  5.  Check Basic metabolic panel in 10-14 days to f/u on starting spironolactone, send results to our clinic.  6.  Clinic 4 weeks.    Component      Latest Ref Rng & Units 6/8/2017   Sodium      133 - 144 mmol/L 138   Potassium      3.4 - 5.3 mmol/L 3.8   Chloride      94 - 109 mmol/L 100   Carbon Dioxide      20 - 32 mmol/L 29   Anion Gap      3 - 14 mmol/L 9   Glucose      70 - 99 mg/dL 126 (H)   Urea Nitrogen      7 - 30 mg/dL 17   Creatinine      0.66 - 1.25 mg/dL 0.76   GFR Estimate      >60 mL/min/1.7m2 >90 . . .   GFR Estimate If Black      >60 mL/min/1.7m2 >90 . . .   Calcium      8.5 - 10.1 mg/dL 8.6       It was a pleasure to see you in clinic today.  If you have questions please call:  Nicole Hollingsworth RN (Sam)  CORE RN  [facility], Cardiology  Phone:  [phone]; option #1, then option #3    [facility] [address]  Cardiology Clinic [address] [place] [address]            Follow-ups after your visit        Follow-up notes from your care team     Return in about 4 weeks (around 7/6/2017) for HF; pls schedule with RICH/Magnolia.      Your next 10 appointments already scheduled     Jul 06, 2017  9:00 AM CDT   Lab with [facility] ([facility] [place])    [address] [place] [id number] [phone]            Jul 06, 2017  9:30 AM CDT   (Arrive by 9:15 AM)   Return Visit with Gillian  MD Winter   [facility] and Infect[facility] Diseases ([facility] [place])    [address] [place] [address]   [phone]            Jul 06, 2017 10:00 AM CDT   (Arrive by 9:45 AM)   CORE RETURN with OFELIA Faulkner CNP   [facility] ([facility] [place])    [address] [place] [address]   [phone]            Aug 07, 2017  1:00 PM CDT   (Arrive by 12:45 PM)   Return Visit with Clarisse Valdes MD   [facility] ([facility] [place])    [address] [place] [address]   [phone]            Sep 18, 2017  8:00 AM CDT   XR SPINE COMPLETE 2 VIEWS with UCXR1   [facility] [place] [facility] ([facility])    [address] [place] [id number] [phone]           Please bring a list of your current medicines to your exam. (Include vitamins, minerals and over-thecounter medicines.) Leave your valuables at home.  Tell your doctor if there is a chance you may be pregnant.  You do not need to do anything special for this exam.            Sep 18, 2017  8:15 AM CDT   XR THORACIC SPINE 2 VIEWS with UCXR1   [facility] ([facility] [place])    [address] [place] [id number] [phone]           Please bring a list of your current medicines to your exam. (Include vitamins, minerals and over-thecounter medicines.) Leave your valuables at home.  Tell your doctor if there is a chance you may be pregnant.  You do not need to do anything special for this exam.            Sep 18, 2017  8:30 AM CDT   (Arrive by 8:15 AM)   Return Visit with Marcelo Trent MD    [facility] ([facility] [place])    [address] [place] [address]   [phone]              Future tests that were ordered for you today      Open Future Orders        Priority Expected Expires Ordered    Basic metabolic panel Routine  6/8/2018 6/8/2017            Who to contact     If you have questions or need follow up information about today's clinic visit or your schedule please contact [facility] directly at [phone].  Normal or non-critical lab and imaging results will be communicated to you by [employer], letter or phone within 4 business days after the clinic has received the results. If you do not hear from us within 7 days, please contact the clinic through [employer] or phone. If you have a critical or abnormal lab result, we will notify you by phone as soon as possible.  Submit refill requests through [employer] or call your pharmacy and they will forward the refill request to us. Please allow 3 business days for your refill to be completed.          Additional Information About Your Visit        [employer] Information     [employer] gives you secure access to your electronic health record. If you see a primary care provider, you can also send messages to your care team and make appointments. If you have questions, please call your primary care clinic.  If you do not have a primary care provider, please call [phone] and they will assist you.        Care EveryWhere ID     This is your Care EveryWhere ID. This could be used by other organizations to access your [facility] medical records  [id number]        Your Vitals Were     Pulse Height Pulse Oximetry BMI (Body Mass Index)          92 1.829 m (6') 100% 26.2 kg/m2         Blood Pressure from Last 3 Encounters:   06/08/17 97/48   05/25/17 93/56   05/15/17 112/66    Weight from Last 3 Encounters:   06/08/17 87.6 kg (193 lb 3.2 oz)   05/25/17 88.5 kg (195 lb)   05/11/17 85 kg (187 lb 6.4 oz)                 Today's Medication Changes          These changes are accurate as of: 6/8/17 12:59 PM.  If you have any questions, ask your nurse or doctor.               These medicines have changed or  have updated prescriptions.        Dose/Directions    insulin glargine 100 UNIT/ML injection   Commonly known as:  LANTUS   This may have changed:  how much to take   Used for:  Epidural abscess        Dose:  22 Units   Inject 22 Units Subcutaneous every morning (before breakfast)   Quantity:  30 mL   Refills:  0       lisinopril 10 MG tablet   Commonly known as:  PRINIVIL/ZESTRIL   This may have changed:  how much to take   Used for:  Essential hypertension        Dose:  10 mg   Take 1 tablet (10 mg) by mouth daily   Quantity:  30 tablet   Refills:  0         Stop taking these medicines if you haven't already. Please contact your care team if you have questions.     potassium chloride SA 20 MEQ CR tablet   Commonly known as:  K-DUR/KLOR-CON M   Stopped by:  Magnolia Cifuentes APRN CNP                    Primary Care Provider Office Phone # Fax #    Gene Guevara [phone]       [place] [address]        Thank you!     Thank you for choosing [facility]  for your care. Our goal is always to provide you with excellent care. Hearing back from our patients is one way we can continue to improve our services. Please take a few minutes to complete the written survey that you may receive in the mail after your visit with us. Thank you!             Your Updated Medication List - Protect others around you: Learn how to safely use, store and throw away your medicines at www.disposemymeds.org.          This list is accurate as of: 6/8/17 12:59 PM.  Always use your most recent med list.                   Brand Name Dispense Instructions for use    acetaminophen 325 MG tablet    TYLENOL    100 tablet    Take 2 tablets (650 mg) by mouth every 4 hours as needed for other (surgical pain)       AMOXICILLIN PO      Take 500 mg by mouth 3 times daily       aspirin 325 MG tablet     120 tablet    Take 1 tablet (325 mg) by mouth daily       atorvastatin 40 MG  tablet    LIPITOR    30 tablet    Take 1 tablet (40 mg) by mouth daily       benzonatate 100 MG capsule    TESSALON    42 capsule    Take 1 capsule (100 mg) by mouth 3 times daily as needed for cough       CELEXA PO      Take 10 mg by mouth daily       cholecalciferol 1000 UNITS Tabs     30 tablet    Take 1,000 Units by mouth daily       clopidogrel 75 MG tablet    PLAVIX    30 tablet    Take 1 tablet (75 mg) by mouth daily       insulin glargine 100 UNIT/ML injection    LANTUS    30 mL    Inject 22 Units Subcutaneous every morning (before breakfast)       LASIX 40 MG tablet   Generic drug:  furosemide      Take 40 mg by mouth daily If weight goes up 2.5 lbs, pt has to take another pill in the PM.       lisinopril 10 MG tablet    PRINIVIL/ZESTRIL    30 tablet    Take 1 tablet (10 mg) by mouth daily       melatonin 3 MG tablet      Take 1 tablet (3 mg) by mouth nightly as needed       metFORMIN 500 MG tablet    GLUCOPHAGE    60 tablet    Take 1 tablet (500 mg) by mouth 2 times daily (with meals)       metoprolol 25 MG tablet    LOPRESSOR    60 tablet    Take 3 tablets (75 mg) by mouth 2 times daily       nitroglycerin 0.4 MG sublingual tablet    NITROSTAT    25 tablet    For chest pain place 1 tablet under the tongue every 5 minutes for 3 doses. If symptoms persist 5 minutes after 1st dose call 911.       nystatin cream    MYCOSTATIN     Apply topically 2 times daily       order for DME     1 Device    Equipment being ordered: 2 shoe       polyethylene glycol Packet    MIRALAX/GLYCOLAX    7 packet    Take 17 g by mouth daily as needed for constipation       ranitidine 150 MG tablet    ZANTAC    60 tablet    Take 1 tablet (150 mg) by mouth 2 times daily       senna-docusate 8.6-50 MG per tablet    SENOKOT-S;PERICOLACE    100 tablet    Take 1-2 tablets by mouth 2 times daily as needed (constipation )       spironolactone 25 MG tablet    ALDACTONE    45 tablet    Take 0.5 tablets (12.5 mg) by mouth daily        tamsulosin 0.4 MG capsule    FLOMAX    60 capsule    Take 2 capsules (0.8 mg) by mouth At Bedtime

## 2017-06-08 NOTE — LETTER
6/8/2017      RE: Tad Manning  [address]     [facility] [id number]       Dear Colleague,    Thank you for the opportunity to participate in the care of your patient, Tad Manning, at the [facility] HEART [facility]. Please see a copy of my visit note below.    Chief Complaint:    Chief Complaint   Patient presents with     Follow Up For     Return CORE; 76yr old male wih a h/o CAD, Aflutter and chronic systolic HF secondary to ICM presenting for HF follow-up with labs prior.         HPI:HISTORY OF PRESENT ILLNESS:  Mr. Tad Manning is a 74-year-old gentleman with a history of coronary artery disease, ischemic cardiomyopathy, HFpEF, hypertension, hyperlipidemia, atrial flutter, diabetes mellitus, anemia, status post left popliteal bypass in 01/2014, had a second toe amputation at the same time.  He has been at the [facility] since February of this year. He returns to clinic today with his brother.  His brother states that he did get dressed by himself this morning.  He also mentioned that he can use a walker with assistance of a person on both sides of him.      I saw him in clinic on 05/11. I did not make any medication changes and asked him to return to clinic today.  He has not had any other heart clinic appointments or hospitalizations since that time.  He returns to clinic today with his brother for routine followup.     He had a paraspinal abscess and a surgical decompression with IV antibiotic therapy in 02/2017.  A KASSIE at that time showed a left ventricular ejection fraction 25%-30%.  He was also seen by Dr. Valdes in our clinic 05/01/2017 started on furosemide p.o. every day.  He was seen by Dr. Max on 05/25, had a chest x-ray that revealed a left pleural effusion, unchanged.      REVIEW OF SYSTEMS:  Overall, he states that he is feeling well.  He continues to gain strength and endurance.  He is planning  to stay at the TCU until at least 06/28 and then possibly go back to his own apartment.  He has not been able to stand since last November.  He gets weighed in his wheelchair on a daily basis.  His weight today at the U is 187.8.  He denies any shortness of breath at rest or with activity.  Denies PND.  He uses 2 pillows when he is sleeping.  Sleeping pattern is good and bad nights.  He states it has been improving.  He denies any chest pain, angina, use of nitroglycerin, lightheaded, dizziness, fainting, falling, ankle edema, nausea or vomiting.  He continues to wear LUDIVINA hose.  He has OT, PT on a daily basis.        HABITS:  He denies the use of tobacco, denies use of alcohol.  The staff at [facility] check his blood sugar 3 times per day.     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)      Coronary artery disease     triple bypass 1995     Diabetes mellitus (H)      Diabetic polyneuropathy (H)      History of blood transfusion      Hyperlipidaemia      Hypertension      Malignant neoplasm (H)     CLL     Noninflammatory pericardial effusion      Osteomyelitis of left foot (H)      PVD (peripheral vascular disease) (H)      Sebaceous cyst        PAST SURGICAL HISTORY  Past Surgical History:   Procedure Laterality Date     AMPUTATE TOE(S)  1/10/2014    Procedure: AMPUTATE TOE(S);;  Surgeon: Amador Vick MD;  Location:  OR     BONE MARROW BIOPSY, BONE SPECIMEN, NEEDLE/TROCAR  10/19/2012    Procedure: BIOPSY BONE MARROW;  BONE MARROW BIOPSY  (CONSCIOUS SED);  Surgeon: Ramon Quesada MD;  Location:  GI     BYPASS GRAFT FEMOROPOPLITEAL  1/10/2014    Procedure: BYPASS GRAFT FEMOROPOPLITEAL;  Left above knee popliteal to  Below knee popliteal bypass using transverse  saphenous vein graft. Left 2nd Toe amputation;  Surgeon: Amador Vick MD;  Location:  OR     CARDIAC SURGERY      angioplasty with stent, triple bypass     EXCISE CYST GENERIC (LOCATION) N/A 2/1/2016    Procedure: EXCISE CYST GENERIC (LOCATION);  Surgeon: Amador Vick MD;  Location:  SD     GRAFT VEIN FROM EXTREMITY (LOCATION)  1/10/2014    Procedure: GRAFT VEIN FROM EXTREMITY (LOCATION);;  Surgeon: Amador Vick MD;  Location: [facility] OR     LAMINECTOMY THORACIC ONE LEVEL N/A 2/8/2017    Procedure: LAMINECTOMY THORACIC ONE LEVEL;  Surgeon: Marcelo Trent MD;  Location: [facility] OR     OPTICAL TRACKING SYSTEM FUSION POSTERIOR SPINE THORACIC THREE+ LEVELS N/A 2/12/2017    Procedure: OPTICAL TRACKING SYSTEM FUSION POSTERIOR SPINE THORACIC THREE+ LEVELS;  Surgeon: Marcelo Trent MD;  Location: [facility] OR     TONSILLECTOMY       VASCULAR SURGERY      ptcr legs         FAMILY HISTORY:  Family History   Problem Relation Age of Onset     CANCER Mother      leukemia         SOCIAL HISTORY:  Social History     Social History     Marital status: Single     Spouse name: N/A     Number of children: N/A     Years of education: N/A     Social History Main Topics     Smoking status: Former Smoker     Packs/day: 2.00     Years: 30.00     Types: Cigarettes     Quit date: 6/19/1995     Smokeless tobacco: Never Used     Alcohol use Yes      Comment: 1 drink monthly     Drug use: No     Sexual activity: Not Asked     Other Topics Concern     None     Social History Narrative    Lives independently with SO. 2/9/2017        ALLERGIES  Allergies   Allergen Reactions     Blood Transfusion Related (Informational Only) Other (See Comments)     Patient has a history of a clinically significant antibody against RBC antigens.  A delay in compatible RBCs may occur.          CURRENT MEDICATIONS:    Current Outpatient Prescriptions on File Prior to Visit:  furosemide (LASIX) 40 MG tablet Take 40 mg by mouth daily If weight goes  up 2.5 lbs, pt has to take another pill in the PM.   nystatin (MYCOSTATIN) cream Apply topically 2 times daily   acetaminophen (TYLENOL) 325 MG tablet Take 2 tablets (650 mg) by mouth every 4 hours as needed for other (surgical pain)   aspirin 325 MG tablet Take 1 tablet (325 mg) by mouth daily   atorvastatin (LIPITOR) 40 MG tablet Take 1 tablet (40 mg) by mouth daily   clopidogrel (PLAVIX) 75 MG tablet Take 1 tablet (75 mg) by mouth daily   insulin glargine (LANTUS) 100 UNIT/ML injection Inject 22 Units Subcutaneous every morning (before breakfast) (Patient taking differently: Inject 16 Units Subcutaneous every morning (before breakfast) )   lisinopril (PRINIVIL/ZESTRIL) 10 MG tablet Take 1 tablet (10 mg) by mouth daily (Patient taking differently: Take 5 mg by mouth daily )   melatonin 3 MG tablet Take 1 tablet (3 mg) by mouth nightly as needed   metFORMIN (GLUCOPHAGE) 500 MG tablet Take 1 tablet (500 mg) by mouth 2 times daily (with meals)   metoprolol (LOPRESSOR) 25 MG tablet Take 3 tablets (75 mg) by mouth 2 times daily   polyethylene glycol (MIRALAX/GLYCOLAX) Packet Take 17 g by mouth daily as needed for constipation   ranitidine (ZANTAC) 150 MG tablet Take 1 tablet (150 mg) by mouth 2 times daily   senna-docusate (SENOKOT-S;PERICOLACE) 8.6-50 MG per tablet Take 1-2 tablets by mouth 2 times daily as needed (constipation )   benzonatate (TESSALON) 100 MG capsule Take 1 capsule (100 mg) by mouth 3 times daily as needed for cough   cholecalciferol 1000 UNITS TABS Take 1,000 Units by mouth daily   tamsulosin (FLOMAX) 0.4 MG capsule Take 2 capsules (0.8 mg) by mouth At Bedtime   nitroglycerin (NITROSTAT) 0.4 MG sublingual tablet For chest pain place 1 tablet under the tongue every 5 minutes for 3 doses. If symptoms persist 5 minutes after 1st dose call 911.   order for DME Equipment being ordered: DH2 shoe     No current facility-administered medications on file prior to visit.     ROS:   Constitutional: No fever,  chills, or sweats. No weight gain/loss.   ENT: No visual disturbance, ear ache, epistaxis, sore throat.   Allergies/Immunologic: Negative.   Respiratory: No cough, hemoptysis.   Cardiovascular: As per HPI.   GI: No nausea, vomiting, hematemesis, melena  : No urinary frequency, dysuria, or hematuria.   Integument: Negative.   Psychiatric: Negative.   Neuro: Negative.   Endocrinology: Negative.   Musculoskeletal: Negative.    EXAM:  BP 97/48 (BP Location: Left arm, Patient Position: Chair, Cuff Size: Adult Regular)  Pulse 92  Ht 1.829 m (6')  Wt 87.6 kg (193 lb 3.2 oz)  SpO2 100%  BMI 26.2 kg/m2  Home weight  General: Elderly man in NAD. appears comfortable, alert and articulate, sitting in WC  Head: normocephalic, atraumatic  Eyes: anicteric sclera, EOMI  Neck: no adenopathy, no carotid bruits noted bilaterally  Orophyarynx: moist mucosa, no lesions, dentition intact  Heart: regular, S1/S2, no murmur, gallop, rub, estimated JVP normal (Examined in WC due to inability to stand)  Lungs: bilaterally clear, without rales, rhonchi or wheezes bilaterally  Abdomen: difficult to examine in WC, soft, non-tender, bowel sounds present, no hepatomegaly  Extremities: no clubbing, cyanosis or edema, wearing sophy hose  Neurological: normal speech and affect, no gross motor deficits  Skin:  No rashes or lesions noted      Labs:  CBC RESULTS:  Lab Results   Component Value Date    WBC 6.7 05/25/2017    RBC 3.97 (L) 05/25/2017    HGB 11.0 (L) 05/25/2017    HCT 36.5 (L) 05/25/2017    MCV 92 05/25/2017    MCH 27.7 05/25/2017    MCHC 30.1 (L) 05/25/2017    RDW 19.0 (H) 05/25/2017     05/25/2017       CMP RESULTS:  Lab Results   Component Value Date     06/08/2017    POTASSIUM 3.8 06/08/2017    CHLORIDE 100 06/08/2017    CO2 29 06/08/2017    ANIONGAP 9 06/08/2017     (H) 06/08/2017    BUN 17 06/08/2017    CR 0.76 06/08/2017    GFRESTIMATED >90  Non  GFR Calc   06/08/2017    GFRESTBLACK  >90   GFR Calc   2017    JUSTINO 8.6 2017    BILITOTAL 0.2 2017    ALBUMIN 2.4 (L) 2017    ALKPHOS 154 (H) 2017    ALT 17 2017    AST 10 2017      Lab Results   Component Value Date    A1C 6.1 (H) 02/10/2017     Lab Results   Component Value Date    CHOL 134 2014     Lab Results   Component Value Date    HDL 34 2014     Lab Results   Component Value Date    LDL 48 2014     Lab Results   Component Value Date    TRIG 257 2014     Lab Results   Component Value Date    CHOLHDLRATIO 3.9 2014       INR RESULTS:  Lab Results   Component Value Date    INR 1.22 (H) 2017       No components found for: CK  Lab Results   Component Value Date    MAG 1.4 (L) 2017     Lab Results   Component Value Date    NTBNPI 15983 (H) 2017       Most recent echocardiogram:  Recent Results (from the past 8760 hour(s))   Echo limited with definity    Narrative    [id number]  [id number]  [id number]  [id number]^LUIGI^YANELY^           [facility],[place]  Echocardiography Laboratory  [address] [place] [address]     Name: PETER BUTLER  MRN: [id number]  : 1940  Study Date: 2017 12:24 PM  Age: 76 yrs  Gender: Male  Patient Location: [facility]  Reason For Study: Arrhythmia  Ordering Physician: YANLEY MEYERS  Performed By: KATI Austin     BSA: 2.1 m2  Height: 72 in  Weight: 190 lb  BP: 128/64 mmHg  _____________________________________________________________________________  __        Procedure  Complete Portable Echo Adult. Contrast Optison. Technically difficult study.  Optison (NDC #5100-4372-74) given intravenously. Patient was given 6 ml  mixture of 3 ml Optison and 6 ml saline. 3 ml wasted. IV start location R  Upper arm . The patient's rhythm is atrial fibrillation.  _____________________________________________________________________________  __        Interpretation  Summary  Technically difficult study.The patient's rhythm is atrial fibrillation.  The left ventricle is severely dilated. Left ventricular systolic function is  severely reduced, ejection fraction is estimated at 25-30%. There is severe  global hypokinesis.  Right ventricular systolic function is mildly reduced.  The right ventricular systolic pressure is approximated at 15 mmHg plus the  right atrial pressure. IVC is plethoric and estimated mean RA pressure is 15  mmHg.  No pericardial effusion present.  _____________________________________________________________________________  __     _____________________________________________________________________________  __     MMode/2D Measurements & Calculations     EF(MOD-bp): 30.0 %        Doppler Measurements & Calculations  TR max alessia: 190.0 cm/sec  TR max P.4 mmHg           _____________________________________________________________________________  __           Report approved by: Edward Snyder 2017 05:09 PM      ECHO COMPLETE WITH OPTISON    Narrative    [id number]  [id number]  [id number]  [id number]^ELOISE^CHARLIE^[facility]  Echocardiography Laboratory  [address] [place] [address]        Name: PETER BUTLER  MRN: [id number]  : 1940  Study Date: 2017 12:38 PM  Age: 76 yrs  Gender: Male  Patient Location: [facility]^[id number]^  Reason For Study: Syncope  Ordering Physician: CHARLIE NAVAS  Referring Physician: CHARLIE NAVAS  Performed By: Liana Lamb     BSA: 2.0 m2  Height: 72 in  Weight: 180 lb  HR: 80  BP: 104/50 mmHg  _____________________________________________________________________________  __        Procedure  Complete Portable Echo Adult. Contrast Optison.  _____________________________________________________________________________  __        Interpretation Summary     The left ventricle is mildly dilated at 6.0 cm. Left ventricular systolic  function is mild to  moderately reduced as the visual ejection fraction is  estimated at 40-45%.  The biplane calculated LVEF = 43%. This decrease in the LVEF is due to mild  global hypokinesia and moderate to severe inferolateral wall hypokinesis.  Grade I or early diastolic dysfunction is noted  There is trace tricuspid regurgitation. The right ventricular systolic  pressure is normal approximated at 16mmHg plus the right atrial pressure.  The left atrium is moderately dilated by volume criteria at 41.6 ml/m2.  There is mild trileaflet aortic sclerosis but no aortic stenosis or  regurgitation is present.  _____________________________________________________________________________  __        Left Ventricle  The left ventricle is mildly dilated. at 6.0 cm. There is normal left  ventricular wall thickness. Grade I or early diastolic dysfunction. E by E  prime ratio is less than 8, that likely suggests normal left ventricular  filling pressures. The visual ejection fraction is estimated at 40-45%. The  biplane calculated LVEF = 43%. Left ventricular systolic function is mild to  moderately reduced. There is mild global hypokinesia of the left ventricle.  There is moderate to severe inferolateral wall hypokinesis. There is no  thrombus seen in the left ventricle.     Right Ventricle  Normal right ventricle structure and size. The right ventricular systolic  function is mildly reduced.     Atria  The left atrium is moderately dilated. Left atrial enlargement is noted by  volume criteria. at 41.6 ml/m2. Right atrial size is normal. Intact atrial  septum.     Mitral Valve  There is mild to moderate mitral annular calcification. There is no evidence  of mitral valve prolapse. There is no mitral valve stenosis.        Tricuspid Valve  The tricuspid valve is normal in structure and function. Right ventricle  systolic pressure estimate normal. There is trace tricuspid regurgitation. The  right ventricular systolic pressure is approximated at  16.6 mmHg plus the  right atrial pressure. The right ventricular systolic pressure is approximated  at 16mmHg plus the right atrial pressure.     Aortic Valve  The aortic valve is normal in structure and function. There is mild trileaflet  aortic sclerosis. No aortic regurgitation is present. No aortic stenosis is  present.     Pulmonic Valve  The pulmonic valve is not well seen, but is grossly normal. There is trace  pulmonic valvular regurgitation.     Vessels  Normal size aorta. The inferior vena cava is not dilated.     Pericardium  The pericardium appears normal. There is no pleural effusion.        Rhythm  The rhythm was normal sinus.  _____________________________________________________________________________  __  MMode/2D Measurements & Calculations  IVSd: 0.65 cm     LVIDd: 5.9 cm  LVIDs: 4.8 cm  LVPWd: 0.76 cm  FS: 18.2 %  EDV(Teich): 173.1 ml  ESV(Teich): 108.9 ml  LV mass(C)d: 154.2 grams  Ao root diam: 3.6 cm  LA dimension: 4.2 cm  asc Aorta Diam: 3.4 cm  LA/Ao: 1.2  LA Volume (BP): 84.8 ml  LA Volume Index (BP): 41.6 ml/m2  TAPSE: 1.0 cm           Doppler Measurements & Calculations  MV E max alessia: 39.1 cm/sec  MV A max alessia: 67.1 cm/sec  MV E/A: 0.58  MV dec time: 0.22 sec  TR max alessia: 203.6 cm/sec  TR max P.6 mmHg  Lateral E/e': 2.8  Medial E/e': 5.5           _____________________________________________________________________________  __           Report approved by: Edward Barry 2017 04:25 PM          Assessment  ASSESSMENT:   1.  Chronic systolic heart failure secondary to ischemic cardiomyopathy, decreased left ventricular function with most recent ejection fraction 25%-30%.  He is New York Heart Association class II.  He continues to be on 10 mg of lisinopril, metoprolol 75 mg p.o. b.i.d., potassium chloride 20 mEq p.o. every day, furosemide 40 mg every day.  He takes an additional 40 mg in the p.m. afternoon if his weight is up 3-5 pounds. SCD prophylaxis.  The patient does  not have an ICD or defibrillator.  Fluid status:  Euvolemic.  NSAIDs:  Currently not using.  Sleep evaluation:  Not applicable.  Cardiac rehab:  He is attending OT, PT.   2.  Coronary artery disease, currently not complaining of chest pain.  He continues to be on Plavix.  States that he has 3 stents in his legs, 1 stent in his heart.  I do not know if this is planned for long term or if there is an end date to Plavix.   3.  Hypertension.  Blood pressure is well controlled.  He does run a systolic blood pressure around 100 mmHg.   4.  Hyperlipidemia.  Continues to be on 40 mg of atorvastatin.   5.  Atrial flutter.  He continues to be on aspirin and metoprolol.  He is not on an anticoagulant.  His CHADS-VASc score is 4.  Warfarin has not been restarted due to his increased risk of falling and not being able to bear weight on his left lower extremity.   6.  Diabetes mellitus type 2.   7.  History of anemia.   8.  Peripheral arterial disease, status post left popliteal bypass complicated with osteomyelitis, and second toe amputation in 01/2014, renal insufficiency.      PLAN:   1.  Start spironolactone 12.5 mg p.o. every day. his BUN today is 17, creatitine 0.76  2.  Discontinue potassium due to starting aldosterone antagonist (spironolactone).   3.  Check a basic metabolic panel at TCU in 10-14 days to follow up on starting spironolactone.   4.  Return to clinic with Dr. Valdes as scheduled in approximately 6 weeks.   5.  Return to [facility].O.R.E[facility] Clinic in 4 weeks.   6.  As documented in prior progress notes, the patient should have an ischemic workup but due to his increased risk of falling and other comorbidities, this has been put on hold and he is being treated medically at this time until he is stronger and has less risk of falling.   7.  Labs were reviewed from today's clinic.      The patient appears to be much stronger than he was 4 weeks ago.        Thanks for allowing us to be involved in his care, feel free to  call if questions or concerns.     Magnolia BERMUDEZ, APRN, CNP  Clinical Nurse Specialist  CORE Clinic/Advanced Heart Failure/Mechanical Circulatory Support  Pager

## 2017-07-02 ENCOUNTER — PRE VISIT (OUTPATIENT)
Dept: CARDIOLOGY | Facility: CLINIC | Age: 77
End: 2017-07-02

## 2017-07-02 DIAGNOSIS — I50.22 CHRONIC SYSTOLIC HEART FAILURE (H): Primary | ICD-10-CM

## 2017-07-06 ENCOUNTER — OFFICE VISIT (OUTPATIENT)
Dept: CARDIOLOGY | Facility: CLINIC | Age: 77
End: 2017-07-06
Attending: NURSE PRACTITIONER
Payer: MEDICARE

## 2017-07-06 ENCOUNTER — OFFICE VISIT (OUTPATIENT)
Dept: INFECTIOUS DISEASES | Facility: CLINIC | Age: 77
End: 2017-07-06
Attending: INTERNAL MEDICINE
Payer: MEDICARE

## 2017-07-06 VITALS
OXYGEN SATURATION: 99 % | HEIGHT: 72 IN | DIASTOLIC BLOOD PRESSURE: 64 MMHG | HEART RATE: 87 BPM | SYSTOLIC BLOOD PRESSURE: 100 MMHG | BODY MASS INDEX: 24.07 KG/M2 | WEIGHT: 177.7 LBS

## 2017-07-06 VITALS — TEMPERATURE: 97.4 F | DIASTOLIC BLOOD PRESSURE: 64 MMHG | HEART RATE: 87 BPM | SYSTOLIC BLOOD PRESSURE: 100 MMHG

## 2017-07-06 DIAGNOSIS — M46.24 OSTEOMYELITIS OF THORACIC SPINE (H): Primary | ICD-10-CM

## 2017-07-06 DIAGNOSIS — I50.22 CHRONIC SYSTOLIC HEART FAILURE (H): ICD-10-CM

## 2017-07-06 DIAGNOSIS — I10 ESSENTIAL HYPERTENSION: ICD-10-CM

## 2017-07-06 DIAGNOSIS — I50.32 CHRONIC DIASTOLIC HEART FAILURE (H): Primary | ICD-10-CM

## 2017-07-06 LAB
ANION GAP SERPL CALCULATED.3IONS-SCNC: 8 MMOL/L (ref 3–14)
BUN SERPL-MCNC: 23 MG/DL (ref 7–30)
CALCIUM SERPL-MCNC: 9.1 MG/DL (ref 8.5–10.1)
CHLORIDE SERPL-SCNC: 100 MMOL/L (ref 94–109)
CO2 SERPL-SCNC: 28 MMOL/L (ref 20–32)
CREAT SERPL-MCNC: 0.88 MG/DL (ref 0.66–1.25)
GFR SERPL CREATININE-BSD FRML MDRD: 85 ML/MIN/1.7M2
GLUCOSE SERPL-MCNC: 167 MG/DL (ref 70–99)
POTASSIUM SERPL-SCNC: 3.9 MMOL/L (ref 3.4–5.3)
SODIUM SERPL-SCNC: 136 MMOL/L (ref 133–144)

## 2017-07-06 PROCEDURE — 99213 OFFICE O/P EST LOW 20 MIN: CPT | Mod: ZF

## 2017-07-06 PROCEDURE — 99212 OFFICE O/P EST SF 10 MIN: CPT | Mod: 27

## 2017-07-06 PROCEDURE — 99214 OFFICE O/P EST MOD 30 MIN: CPT | Mod: ZP | Performed by: NURSE PRACTITIONER

## 2017-07-06 RX ORDER — FUROSEMIDE 40 MG
40 TABLET ORAL DAILY
Qty: 30 TABLET | COMMUNITY
Start: 2017-07-06 | End: 2017-09-18 | Stop reason: DRUGHIGH

## 2017-07-06 RX ORDER — LISINOPRIL 10 MG/1
10 TABLET ORAL DAILY
Qty: 30 TABLET | COMMUNITY
Start: 2017-07-06 | End: 2017-08-28

## 2017-07-06 ASSESSMENT — PAIN SCALES - GENERAL
PAINLEVEL: NO PAIN (0)
PAINLEVEL: NO PAIN (0)

## 2017-07-06 NOTE — PATIENT INSTRUCTIONS
1. Stop amoxicillin today  2. If any new redness or drainage from incision, new pain, or fevers occur, notify the infectious diseases clinic immediately or go to the emergency room if on the weekend.

## 2017-07-06 NOTE — NURSING NOTE
Diet: Patient instructed regarding a heart failure healthy diet, including discussion of reduced fat and 2000 mg daily sodium restriction, daily weights, medication purpose and compliance, fluid restrictions and resources for patient and family to access for assistance with heart failure management.       Labs: Patient was given results of the laboratory testing obtained today and patient was instructed about when to return for the next laboratory testing.    Med Reconcile: Reviewed and verified all current medications with the patient. The updated medication list was printed and given to the patient. 40 lasix in am and 20mg of lasix in pm if 3-4 lb weight gain.     Return Appointment: Patient given instructions regarding scheduling next clinic visit. F/u with CORE provider in 6 weeks.     Patient stated he understood all health information given and agreed to call with further questions or concerns.

## 2017-07-06 NOTE — PATIENT INSTRUCTIONS
"You were seen today in the Cardiovascular Clinic at the [facility].     Cardiology Providers you saw during your visit: Magnolia Cifuentes       1.Decrease furosemide to 40 mg in am only.  Take an additional 20 mg in pm if weight increases 3-4 pounds.  2.  Please make a follow-up CORE/heart failure appt with Magnolia in 6weeks with labs prior.     Results for PETER BUTLER (MRN [id number]) as of 2017 11:06   Ref. Range 2017 09:29   Sodium Latest Ref Range: 133 - 144 mmol/L 136   Potassium Latest Ref Range: 3.4 - 5.3 mmol/L 3.9   Chloride Latest Ref Range: 94 - 109 mmol/L 100   Carbon Dioxide Latest Ref Range: 20 - 32 mmol/L 28   Urea Nitrogen Latest Ref Range: 7 - 30 mg/dL 23   Creatinine Latest Ref Range: 0.66 - 1.25 mg/dL 0.88   GFR Estimate Latest Ref Range: >60 mL/min/1.7m2 85   GFR Estimate If Black Latest Ref Range: >60 mL/min/1.7m2 >90...   Calcium Latest Ref Range: 8.5 - 10.1 mg/dL 9.1   Anion Gap Latest Ref Range: 3 - 14 mmol/L 8   Glucose Latest Ref Range: 70 - 99 mg/dL 167 (H)         Please limit your fluid intake to 2 L (64 ounces) daily.  2 Liters a day = 8.5 cups, or 72 ounces.  Please limit your salt intake to 2 grams a day or less.    If you gain 2# in 24 hours or 5# in one week call Diane Moore RN so we can adjust your medications as needed over the phone.    Please feel free to call me with any questions or concerns.      Diane Moore RN BSN   [facility]  Cardiology Care Coordinator-Heart Failure Clinic    Questions and [phone].   First press #1 for the [employer] and then press #3 for \"Medical Questions\" to reach us Cardiology Nurses.     On Call Cardiologist for after hours or on weekends: [phone]   option #4 and ask to speak to the on-call Cardiologist. Inform them you are a CORE/heart failure patient at the [facility].        If you need a medication refill please contact your pharmacy.  Please allow 3 business days for your refill " to be completed.  _______________________________________________________  [facility] CLINIC Cardiomyopathy, Optimization, Rehabilitation, Education   The C.O.R.E[facility] CLINIC is a heart failure specialty clinic within the [facility] where you will work with specialized nurse practitioners dedicated to helping patients with heart failure carefully adjust medications, receive education, and learn who and when to call if symptoms develop. They specialize in helping you better understand your condition, slow the progression of your disease, improve the length and quality of your life, help you detect future heart problems before they become life threatening, and avoid hospitalizations.  As always, thank you for trusting us with your health care needs!

## 2017-07-06 NOTE — LETTER
7/6/2017      RE: Tad Manning  [address]   [facility] [id number]       Dear Colleague,    Thank you for the opportunity to participate in the care of your patient, Tad Manning, at the [facility] at [facility]. Please see a copy of my visit note below.    Chief Complaint:    Chief Complaint   Patient presents with     Follow Up For     Return CORE; 76yr old male wih a h/o CAD, Aflutter and chronic systolic HF secondary to ICM presenting for HF follow-up with labs prior.         HPI:  Chief Complaint:    Chief Complaint   Patient presents with     Follow Up For     Return CORE; 76yr old male wih a h/o CAD, Aflutter and chronic systolic HF secondary to ICM presenting for HF follow-up with labs prior.         HPI:  Mr. Tad Manning is a 74-year-old gentleman with a history of coronary artery disease, ischemic cardiomyopathy, HFpEF, hypertension, hyperlipidemia, atrial flutter, diabetes mellitus, anemia, status post left popliteal bypass in 01/2014, had a second toe amputation at the same time.  I saw him in clinic on 6/8/17.  At that clinic appointment I started spironolactone 12.5 mg in am and d/jillian his KCL. [facility]. He returns to clinic today with his brother.  He was seen in ID clinic today and had amoxicillin d/jillian.    He had a paraspinal abscess and a surgical decompression with IV antibiotic therapy in 02/2017.  A KASSIE at that time showed a left ventricular ejection fraction 25%-30%.  He was also seen by Dr. Valdes in our clinic 05/01/2017 started on furosemide p.o. every day.  He was seen by Dr. Max on 05/25, had a chest x-ray that revealed a left pleural effusion, unchanged.      REVIEW OF SYSTEMS:  He states that he is feeling well.  He is planning to stay at the [facility] for at least another 22 days.  Overall, he states that he is feeling well.  He continues to gain  strength and endurance.   He can stand with assistance and can be weighed standing up.   He denies any shortness of breath at rest or with activity.  Denies PND.  He uses 2 pillows when he is sleeping.  Sleeping pattern is improving.  He denies any chest pain, angina, use of nitroglycerin, lightheaded, dizziness, fainting, falling, ankle edema, nausea or vomiting.  He continues to wear LUDIVINA hose.  He has OT, PT on a daily basis.   He feels that his appetite is increasing.      HABITS:  He denies the use of tobacco, denies use of alcohol.  The staff at [facility] check his blood sugar 3 times per day.     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)      Coronary artery disease     triple bypass 1995     Diabetes mellitus (H)      Diabetic polyneuropathy (H)      History of blood transfusion      Hyperlipidaemia      Hypertension      Malignant neoplasm (H)     CLL     Noninflammatory pericardial effusion      Osteomyelitis of left foot (H)      PVD (peripheral vascular disease) (H)      Sebaceous cyst        PAST SURGICAL HISTORY  Past Surgical History:   Procedure Laterality Date     AMPUTATE TOE(S)  1/10/2014    Procedure: AMPUTATE TOE(S);;  Surgeon: Amador Vick MD;  Location:  OR     BONE MARROW BIOPSY, BONE SPECIMEN, NEEDLE/TROCAR  10/19/2012    Procedure: BIOPSY BONE MARROW;  BONE MARROW BIOPSY  (CONSCIOUS SED);  Surgeon: Ramon Quesada MD;  Location:  GI     BYPASS GRAFT FEMOROPOPLITEAL  1/10/2014    Procedure: BYPASS GRAFT FEMOROPOPLITEAL;  Left above knee popliteal to  Below knee popliteal bypass using transverse saphenous vein graft. Left 2nd Toe amputation;  Surgeon: Amador Vick MD;  Location:  OR     CARDIAC SURGERY      angioplasty with  stent, triple bypass     EXCISE CYST GENERIC (LOCATION) N/A 2/1/2016    Procedure: EXCISE CYST GENERIC (LOCATION);  Surgeon: Amador Vick MD;  Location:  SD     GRAFT VEIN FROM EXTREMITY (LOCATION)  1/10/2014    Procedure: GRAFT VEIN FROM EXTREMITY (LOCATION);;  Surgeon: Amador Vick MD;  Location: [facility] OR     LAMINECTOMY THORACIC ONE LEVEL N/A 2/8/2017    Procedure: LAMINECTOMY THORACIC ONE LEVEL;  Surgeon: Marcelo Trent MD;  Location: [facility] OR     OPTICAL TRACKING SYSTEM FUSION POSTERIOR SPINE THORACIC THREE+ LEVELS N/A 2/12/2017    Procedure: OPTICAL TRACKING SYSTEM FUSION POSTERIOR SPINE THORACIC THREE+ LEVELS;  Surgeon: Marcelo Trent MD;  Location: [facility] OR     TONSILLECTOMY       VASCULAR SURGERY      ptcr legs         FAMILY HISTORY:  Family History   Problem Relation Age of Onset     CANCER Mother      leukemia         SOCIAL HISTORY:  Social History     Social History     Marital status: Single     Spouse name: N/A     Number of children: N/A     Years of education: N/A     Social History Main Topics     Smoking status: Former Smoker     Packs/day: 2.00     Years: 30.00     Types: Cigarettes     Quit date: 6/19/1995     Smokeless tobacco: Never Used     Alcohol use Yes      Comment: 1 drink monthly     Drug use: No     Sexual activity: Not Asked     Other Topics Concern     None     Social History Narrative    Lives independently with SO. 2/9/2017        ALLERGIES  Allergies   Allergen Reactions     Blood Transfusion Related (Informational Only) Other (See Comments)     Patient has a history of a clinically significant antibody against RBC antigens.  A delay in compatible RBCs may occur.          CURRENT MEDICATIONS:    Current Outpatient Prescriptions on File Prior to Visit:  AMOXICILLIN PO Take 500 mg by mouth 3 times daily   Citalopram Hydrobromide (CELEXA PO) Take 10 mg by mouth daily   spironolactone (ALDACTONE) 25 MG tablet Take 0.5 tablets (12.5 mg) by mouth daily   nystatin  (MYCOSTATIN) cream Apply topically 2 times daily   aspirin 325 MG tablet Take 1 tablet (325 mg) by mouth daily   atorvastatin (LIPITOR) 40 MG tablet Take 1 tablet (40 mg) by mouth daily   clopidogrel (PLAVIX) 75 MG tablet Take 1 tablet (75 mg) by mouth daily   metFORMIN (GLUCOPHAGE) 500 MG tablet Take 1 tablet (500 mg) by mouth 2 times daily (with meals)   metoprolol (LOPRESSOR) 25 MG tablet Take 3 tablets (75 mg) by mouth 2 times daily   polyethylene glycol (MIRALAX/GLYCOLAX) Packet Take 17 g by mouth daily as needed for constipation   ranitidine (ZANTAC) 150 MG tablet Take 1 tablet (150 mg) by mouth 2 times daily   senna-docusate (SENOKOT-S;PERICOLACE) 8.6-50 MG per tablet Take 1-2 tablets by mouth 2 times daily as needed (constipation )   benzonatate (TESSALON) 100 MG capsule Take 1 capsule (100 mg) by mouth 3 times daily as needed for cough   cholecalciferol 1000 UNITS TABS Take 1,000 Units by mouth daily   tamsulosin (FLOMAX) 0.4 MG capsule Take 2 capsules (0.8 mg) by mouth At Bedtime   nitroglycerin (NITROSTAT) 0.4 MG sublingual tablet For chest pain place 1 tablet under the tongue every 5 minutes for 3 doses. If symptoms persist 5 minutes after 1st dose call 911.   order for DME Equipment being ordered: [facility] shoe   [DISCONTINUED] furosemide (LASIX) 40 MG tablet Take 40 mg by mouth daily If weight goes up 2.5 lbs, pt has to take another pill in the PM.   [DISCONTINUED] lisinopril (PRINIVIL/ZESTRIL) 10 MG tablet Take 1 tablet (10 mg) by mouth daily (Patient taking differently: Take 5 mg by mouth daily )     No current facility-administered medications on file prior to visit.     ROS:   Constitutional: No fever, chills, or sweats. No weight gain/loss.   ENT: No visual disturbance, ear ache, epistaxis, sore throat.   Allergies/Immunologic: Negative.   Respiratory: No cough, hemoptysis.   Cardiovascular: As per HPI.   GI: No nausea, vomiting, hematemesis, melena  : No urinary frequency, dysuria, or hematuria.    Integument: Negative.   Psychiatric: Negative.   Neuro: Negative.   Endocrinology: Negative.   Musculoskeletal: Negative.    EXAM:  /64 (BP Location: Left arm, Patient Position: Chair, Cuff Size: Adult Regular)  Pulse 87  Ht 1.829 m (6')  Wt 80.6 kg (177 lb 11.2 oz)  SpO2 99%  BMI 24.1 kg/m2  Home weight  General: Elderly man in NAD. appears comfortable, alert and articulate, sitting in WC  Head: normocephalic, atraumatic  Eyes: anicteric sclera, EOMI  Neck: no adenopathy, no carotid bruits noted bilaterally  Orophyarynx: moist mucosa, no lesions, dentition intact  Heart: regular, S1/S2, no murmur, gallop, rub, no JVD noted  Lungs: bilaterally clear, without crackles, rhonchi or wheezes bilaterally  Abdomen: soft, non-tender, bowel sounds present, no hepatomegaly  Extremities: no clubbing, cyanosis or edema, wearing sophy hose  Neurological: normal speech and affect, no gross motor deficits  Skin:  No rashes or lesions noted      LCMP RESULTS:  Lab Results   Component Value Date     07/06/2017    POTASSIUM 3.9 07/06/2017    CHLORIDE 100 07/06/2017    CO2 28 07/06/2017    ANIONGAP 8 07/06/2017     (H) 07/06/2017    BUN 23 07/06/2017    CR 0.88 07/06/2017    GFRESTIMATED 85 07/06/2017    GFRESTBLACK >90   GFR Calc   07/06/2017    JUSTINO 9.1 07/06/2017    BILITOTAL 0.2 04/06/2017    ALBUMIN 2.4 (L) 04/06/2017    ALKPHOS 154 (H) 04/06/2017    ALT 17 04/06/2017    AST 10 04/06/2017      Lab Results   Component Value Date    A1C 6.1 (H) 02/10/2017     Lab Results   Component Value Date    CHOL 134 05/23/2014     Lab Results   Component Value Date    HDL 34 05/23/2014     Lab Results   Component Value Date    LDL 48 05/23/2014     Lab Results   Component Value Date    TRIG 257 05/23/2014     Lab Results   Component Value Date    CHOLHDLRATIO 3.9 05/23/2014       INR RESULTS:  Lab Results   Component Value Date    INR 1.22 (H) 02/12/2017       No components found for: CK  Lab Results    Component Value Date    MAG 1.4 (L) 2017     Lab Results   Component Value Date    NTBNPI 27524 (H) 2017       Most recent echocardiogram:  Recent Results (from the past 8760 hour(s))   Echo limited with definity    Narrative    [id number]  [id number]  [id number]  [id number]^LUIGI^YANELY^           [facility]  Echocardiography Laboratory  [address] [place] [address]     Name: PETER BUTLER  MRN: [id number]  : 1940  Study Date: 2017 12:24 PM  Age: 76 yrs  Gender: Male  Patient Location: [facility]  Reason For Study: Arrhythmia  Ordering Physician: YANELY MEYERS  Performed By: DO Austin     BSA: 2.1 m2  Height: 72 in  Weight: 190 lb  BP: 128/64 mmHg  _____________________________________________________________________________  __        Procedure  Complete Portable Echo Adult. Contrast Optison. Technically difficult study.  Optison (NDC #5983-9177-59) given intravenously. Patient was given 6 ml  mixture of 3 ml Optison and 6 ml saline. 3 ml wasted. IV start location R  Upper arm . The patient's rhythm is atrial fibrillation.  _____________________________________________________________________________  __        Interpretation Summary  Technically difficult study.The patient's rhythm is atrial fibrillation.  The left ventricle is severely dilated. Left ventricular systolic function is  severely reduced, ejection fraction is estimated at 25-30%. There is severe  global hypokinesis.  Right ventricular systolic function is mildly reduced.  The right ventricular systolic pressure is approximated at 15 mmHg plus the  right atrial pressure. IVC is plethoric and estimated mean RA pressure is 15  mmHg.  No pericardial effusion present.  _____________________________________________________________________________  __     _____________________________________________________________________________  __     MMode/2D Measurements & Calculations      EF(MOD-bp): 30.0 %        Doppler Measurements & Calculations  TR max alessia: 190.0 cm/sec  TR max P.4 mmHg           _____________________________________________________________________________  __           Report approved by: Edward Snyder 2017 05:09 PM      ECHO COMPLETE WITH OPTISON    Narrative    [id number]  [id number]  [id number]  [id number]^ELOISE^CHARLIE^VALARIE           [facility]  Echocardiography Laboratory  [address] [place] [id number]        Name: PETER BUTLER  MRN: [id number]  : 1940  Study Date: 2017 12:38 PM  Age: 76 yrs  Gender: Male  Patient Location: [place] [id number]^  Reason For Study: Syncope  Ordering Physician: CHARLIE NAVAS  Referring Physician: CHARLIE NAVAS  Performed By: Liana Lamb     BSA: 2.0 m2  Height: 72 in  Weight: 180 lb  HR: 80  BP: 104/50 mmHg  _____________________________________________________________________________  __        Procedure  Complete Portable Echo Adult. Contrast Optison.  _____________________________________________________________________________  __        Interpretation Summary     The left ventricle is mildly dilated at 6.0 cm. Left ventricular systolic  function is mild to moderately reduced as the visual ejection fraction is  estimated at 40-45%.  The biplane calculated LVEF = 43%. This decrease in the LVEF is due to mild  global hypokinesia and moderate to severe inferolateral wall hypokinesis.  Grade I or early diastolic dysfunction is noted  There is trace tricuspid regurgitation. The right ventricular systolic  pressure is normal approximated at 16mmHg plus the right atrial pressure.  The left atrium is moderately dilated by volume criteria at 41.6 ml/m2.  There is mild trileaflet aortic sclerosis but no aortic stenosis or  regurgitation is present.  _____________________________________________________________________________  __        Left Ventricle  The left ventricle is  mildly dilated. at 6.0 cm. There is normal left  ventricular wall thickness. Grade I or early diastolic dysfunction. E by E  prime ratio is less than 8, that likely suggests normal left ventricular  filling pressures. The visual ejection fraction is estimated at 40-45%. The  biplane calculated LVEF = 43%. Left ventricular systolic function is mild to  moderately reduced. There is mild global hypokinesia of the left ventricle.  There is moderate to severe inferolateral wall hypokinesis. There is no  thrombus seen in the left ventricle.     Right Ventricle  Normal right ventricle structure and size. The right ventricular systolic  function is mildly reduced.     Atria  The left atrium is moderately dilated. Left atrial enlargement is noted by  volume criteria. at 41.6 ml/m2. Right atrial size is normal. Intact atrial  septum.     Mitral Valve  There is mild to moderate mitral annular calcification. There is no evidence  of mitral valve prolapse. There is no mitral valve stenosis.        Tricuspid Valve  The tricuspid valve is normal in structure and function. Right ventricle  systolic pressure estimate normal. There is trace tricuspid regurgitation. The  right ventricular systolic pressure is approximated at 16.6 mmHg plus the  right atrial pressure. The right ventricular systolic pressure is approximated  at 16mmHg plus the right atrial pressure.     Aortic Valve  The aortic valve is normal in structure and function. There is mild trileaflet  aortic sclerosis. No aortic regurgitation is present. No aortic stenosis is  present.     Pulmonic Valve  The pulmonic valve is not well seen, but is grossly normal. There is trace  pulmonic valvular regurgitation.     Vessels  Normal size aorta. The inferior vena cava is not dilated.     Pericardium  The pericardium appears normal. There is no pleural effusion.        Rhythm  The rhythm was normal  sinus.  _____________________________________________________________________________  __  MMode/2D Measurements & Calculations  IVSd: 0.65 cm     LVIDd: 5.9 cm  LVIDs: 4.8 cm  LVPWd: 0.76 cm  FS: 18.2 %  EDV(Teich): 173.1 ml  ESV(Teich): 108.9 ml  LV mass(C)d: 154.2 grams  Ao root diam: 3.6 cm  LA dimension: 4.2 cm  asc Aorta Diam: 3.4 cm  LA/Ao: 1.2  LA Volume (BP): 84.8 ml  LA Volume Index (BP): 41.6 ml/m2  TAPSE: 1.0 cm           Doppler Measurements & Calculations  MV E max alessia: 39.1 cm/sec  MV A max alessia: 67.1 cm/sec  MV E/A: 0.58  MV dec time: 0.22 sec  TR max alessia: 203.6 cm/sec  TR max P.6 mmHg  Lateral E/e': 2.8  Medial E/e': 5.5           _____________________________________________________________________________  __           Report approved by: Edward Barry 2017 04:25 PM          Assessment  ASSESSMENT:   1.  Chronic systolic heart failure secondary to ischemic cardiomyopathy, decreased left ventricular function with most recent ejection fraction 25%-30%.  He is New York Heart Association class II.  He continues to be on 10 mg of lisinopril, metoprolol 75 mg p.o. b.i.d.,  furosemide 40 mg every day.  He takes an additional 20 mg in the p.m. afternoon if his weight is up 3-5 pounds. SCD prophylaxis.  Spironolactone 12.5 mg every day. The patient does not have an ICD or defibrillator.  Fluid status:  Euvolemic.  NSAIDs:  Currently not using.  Sleep evaluation:  Not applicable.  Cardiac rehab:  He is attending OT, PT.   2.  Coronary artery disease, currently not complaining of chest pain.  He continues to be on Plavix.  States that he has 3 stents in his legs, 1 stent in his heart.  I do not know if this is planned for long term or if there is an end date to Plavix.   3.  Hypertension.  Blood pressure is well controlled.  He does run a systolic blood pressure around 100 mmHg.   4.  Hyperlipidemia.  Continues to be on 40 mg of atorvastatin.   5.  Atrial flutter.  He continues to be on  aspirin and metoprolol.  He is not on an anticoagulant.  His CHADS-VASc score is 4.  Warfarin has not been restarted due to his increased risk of falling and not being able to bear weight on his left lower extremity.   6.  Diabetes mellitus type 2.   7.  History of anemia.   8.  Peripheral arterial disease, status post left popliteal bypass complicated with osteomyelitis, and second toe amputation in 01/2014, renal insufficiency.      PLAN:   1.  Decrease furosemide from 40 mg BID to 40 mg every day.   2,. Return to clinic with Dr. Valdes as scheduled 8/28/17.  3.  RTC with Jess Chi CNP in [facility] clinic 8/14/17   3.  As documented in prior progress notes, the patient should have an ischemic workup but due to his increased risk of falling and other comorbidities, this has been put on hold and he is being treated medically at this time until he is stronger and has less risk of falling.   4.  Labs were reviewed from today's clinic.      The patient appears to be much stronger than he was 4 weeks ago.        Thanks for allowing us to be involved in his care, feel free to call if questions or concerns.     Magnolia BERMUDEZ, OFELIA, CNP  Clinical Nurse Specialist  [facility]/Advanced Heart Failure/Mechanical Circulatory Support  Pager

## 2017-07-06 NOTE — NURSING NOTE
Chief Complaint   Patient presents with     Follow Up For     Return CORE; 76yr old male wih a h/o CAD, Aflutter and chronic systolic HF secondary to ICM presenting for HF follow-up with labs prior.     Vitals were taken and medications were reconciled.    MOE Salazar  10:54 AM

## 2017-07-06 NOTE — NURSING NOTE
Chief Complaint   Patient presents with     Follow Up For     Return CORE; 76yr old male wih a h/o CAD, Aflutter and chronic systolic HF secondary to ICM presenting for HF follow-up with labs prior.

## 2017-07-06 NOTE — MR AVS SNAPSHOT
After Visit Summary   2017    Tad Manning    MRN: [id number]           Patient Information     Date Of Birth          1940        Visit Information        Provider Department      2017 10:00 AM Magnolia Cifuentes APRN CNP [facility]        Today's Diagnoses     Chronic diastolic heart failure (H)    -  1    Essential hypertension          Care Instructions    You were seen today in the Cardiovascular Clinic at the [facility].     Cardiology Providers you saw during your visit: Magnolia Cifuentes       1.Decrease furosemide to 40 mg in am only.  Take an additional 20 mg in pm if weight increases 3-4 pounds.  2.  Please make a follow-up CORE/heart failure appt with Magnolia in 6weeks with labs prior.     Results for TAD MANNING (MRN [id number]) as of 2017 11:06   Ref. Range 2017 09:29   Sodium Latest Ref Range: 133 - 144 mmol/L 136   Potassium Latest Ref Range: 3.4 - 5.3 mmol/L 3.9   Chloride Latest Ref Range: 94 - 109 mmol/L 100   Carbon Dioxide Latest Ref Range: 20 - 32 mmol/L 28   Urea Nitrogen Latest Ref Range: 7 - 30 mg/dL 23   Creatinine Latest Ref Range: 0.66 - 1.25 mg/dL 0.88   GFR Estimate Latest Ref Range: >60 mL/min/1.7m2 85   GFR Estimate If Black Latest Ref Range: >60 mL/min/1.7m2 >90...   Calcium Latest Ref Range: 8.5 - 10.1 mg/dL 9.1   Anion Gap Latest Ref Range: 3 - 14 mmol/L 8   Glucose Latest Ref Range: 70 - 99 mg/dL 167 (H)         Please limit your fluid intake to 2 L (64 ounces) daily.  2 Liters a day = 8.5 cups, or 72 ounces.  Please limit your salt intake to 2 grams a day or less.    If you gain 2# in 24 hours or 5# in one week call Diane Moore RN so we can adjust your medications as needed over the phone.    Please feel free to call me with any questions or concerns.      Diane Moore RN BSN   [facility]  Cardiology Care Coordinator-Heart Failure Clinic    Questions and [phone].   First press  "#1 for the University and then press #3 for \"Medical Questions\" to reach us Cardiology Nurses.     On Call Cardiologist for after hours or on weekends: [phone]   option #4 and ask to speak to the on-call Cardiologist. Inform them you are a CORE/heart failure patient at the [place].        If you need a medication refill please contact your pharmacy.  Please allow 3 business days for your refill to be completed.  _______________________________________________________  C.O.R.E. CLINIC Cardiomyopathy, Optimization, Rehabilitation, Education   The C.O.R.E. CLINIC is a heart failure specialty clinic within the [facility] where you will work with specialized nurse practitioners dedicated to helping patients with heart failure carefully adjust medications, receive education, and learn who and when to call if symptoms develop. They specialize in helping you better understand your condition, slow the progression of your disease, improve the length and quality of your life, help you detect future heart problems before they become life threatening, and avoid hospitalizations.  As always, thank you for trusting us with your health care needs!                Follow-ups after your visit        Your next 10 appointments already scheduled     Aug 14, 2017 10:30 AM CDT   Lab with [facility] LAB    Health Lab ([facility])    [address] [place] [id number] [phone]            Aug 14, 2017 11:00 AM CDT   (Arrive by 10:45 AM)   CORE RETURN with Jess Chi NP   [facility] ([facility])    [address] [place] [id number] [phone]            Aug 28, 2017  9:00 AM CDT   (Arrive by 8:45 AM)   Return Visit with Clarisse Valdes MD   [facility] ([facility])    [address] [place] [address] [phone]         "    Sep 18, 2017  8:00 AM CDT   XR SPINE COMPLETE 2 VIEWS with UCXR1   [facility] ([facility])    [address] [address] [phone]           Please bring a list of your current medicines to your exam. (Include vitamins, minerals and over-thecounter medicines.) Leave your valuables at home.  Tell your doctor if there is a chance you may be pregnant.  You do not need to do anything special for this exam.            Sep 18, 2017  8:15 AM CDT   XR THORACIC SPINE 2 VIEWS with UCXR1   [facility] ([facility])    [address] [id number] [phone]           Please bring a list of your current medicines to your exam. (Include vitamins, minerals and over-thecounter medicines.) Leave your valuables at home.  Tell your doctor if there is a chance you may be pregnant.  You do not need to do anything special for this exam.            Sep 18, 2017  8:30 AM CDT   (Arrive by 8:15 AM)   Return Visit with Marcelo Trent MD   [facility] ([facility] [place] [facility])    [address] [place] [address] [phone]              Who to contact     If you have questions or need follow up information about today's clinic visit or your schedule please contact [facility] directly at [phone].  Normal or non-critical lab and imaging results will be communicated to you by MyChart, letter or phone within 4 business days after the clinic has received the results. If you do not hear from us within 7 days, please contact the clinic through MyChart or phone. If you have a critical or abnormal lab result, we will notify you by phone as soon as possible.  Submit refill requests through [employer] or call your pharmacy and they will forward the refill request to us. Please allow 3 business days for your refill to be completed.           Additional Information About Your Visit        [employer] Information     [employer] gives you secure access to your electronic health record. If you see a primary care provider, you can also send messages to your care team and make appointments. If you have questions, please call your primary care clinic.  If you do not have a primary care provider, please call [phone] and they will assist you.        Care EveryWhere ID     This is your Care EveryWhere ID. This could be used by other organizations to access your [place] medical records  [id number]        Your Vitals Were     Pulse Height Pulse Oximetry BMI (Body Mass Index)          87 1.829 m (6') 99% 24.1 kg/m2         Blood Pressure from Last 3 Encounters:   07/06/17 100/64   07/06/17 100/64   06/08/17 97/48    Weight from Last 3 Encounters:   07/06/17 80.6 kg (177 lb 11.2 oz)   06/08/17 87.6 kg (193 lb 3.2 oz)   05/25/17 88.5 kg (195 lb)              Today, you had the following     No orders found for display         Today's Medication Changes          These changes are accurate as of: 7/6/17 12:21 PM.  If you have any questions, ask your nurse or doctor.               These medicines have changed or have updated prescriptions.        Dose/Directions    LASIX 40 MG tablet   This may have changed:  additional instructions   Generic drug:  furosemide   Changed by:  Magnolia Cifuentes APRN CNP        Dose:  40 mg   Take 1 tablet (40 mg) by mouth daily If weight goes up 4-5 lbs, take an additional 1/2 tablet (20 mg) at 2 pm   Quantity:  30 tablet   Refills:  0       lisinopril 10 MG tablet   Commonly known as:  PRINIVIL/ZESTRIL   This may have changed:  how much to take   Used for:  Essential hypertension, Chronic diastolic heart failure (H)        Dose:  10 mg   Take 1 tablet (10 mg) by mouth daily   Quantity:  30 tablet   Refills:  0         Stop taking these medicines if you haven't already. Please contact your care team if you have questions.      acetaminophen 325 MG tablet   Commonly known as:  TYLENOL   Stopped by:  Gillian Max MD           insulin glargine 100 UNIT/ML injection   Commonly known as:  LANTUS   Stopped by:  Gillian Max MD           melatonin 3 MG tablet   Stopped by:  Gillian Max MD                    Primary Care Provider Office Phone # Fax #    Gene Guevara [phone]       [place] [address]        Equal Access to Services     [facility]: Hadii aad ku hadasho Soomaali, waaxda luqadaha, qaybta kaalmada adeegyada, waxay idiin hayaan adeeg joao howard . So [facility] [phone].    ATENCIÓN: Si habla español, tiene a noe disposición servicios gratuitos de asistencia lingüística. [facility] [phone].    We comply with applicable federal civil rights laws and [place] laws. We do not discriminate on the basis of race, color, national origin, age, disability sex, sexual orientation or gender identity.            Thank you!     Thank you for choosing [facility]  for your care. Our goal is always to provide you with excellent care. Hearing back from our patients is one way we can continue to improve our services. Please take a few minutes to complete the written survey that you may receive in the mail after your visit with us. Thank you!             Your Updated Medication List - Protect others around you: Learn how to safely use, store and throw away your medicines at www.disposemymeds.org.          This list is accurate as of: 7/6/17 12:21 PM.  Always use your most recent med list.                   Brand Name Dispense Instructions for use Diagnosis    AMOXICILLIN PO      Take 500 mg by mouth 3 times daily        aspirin 325 MG tablet     120 tablet    Take 1 tablet (325 mg) by mouth daily    Acute midline low back pain, with sciatica presence unspecified       atorvastatin 40 MG tablet    LIPITOR    30 tablet    Take 1 tablet (40 mg) by mouth  daily    Claudication of left lower extremity (H)       benzonatate 100 MG capsule    TESSALON    42 capsule    Take 1 capsule (100 mg) by mouth 3 times daily as needed for cough    Acute on chronic systolic congestive heart failure (H)       CELEXA PO      Take 10 mg by mouth daily        cholecalciferol 1000 UNITS Tabs     30 tablet    Take 1,000 Units by mouth daily    Loop diuretic causing adverse effect in therapeutic use, subsequent encounter       clopidogrel 75 MG tablet    PLAVIX    30 tablet    Take 1 tablet (75 mg) by mouth daily    Atrial flutter, unspecified type (H)       LASIX 40 MG tablet   Generic drug:  furosemide     30 tablet    Take 1 tablet (40 mg) by mouth daily If weight goes up 4-5 lbs, take an additional 1/2 tablet (20 mg) at 2 pm        lisinopril 10 MG tablet    PRINIVIL/ZESTRIL    30 tablet    Take 1 tablet (10 mg) by mouth daily    Essential hypertension, Chronic diastolic heart failure (H)       metFORMIN 500 MG tablet    GLUCOPHAGE    60 tablet    Take 1 tablet (500 mg) by mouth 2 times daily (with meals)    Type 2 diabetes mellitus with diabetic polyneuropathy, without long-term current use of insulin (H)       metoprolol 25 MG tablet    LOPRESSOR    60 tablet    Take 3 tablets (75 mg) by mouth 2 times daily    Atrial flutter, unspecified type (H)       nitroGLYcerin 0.4 MG sublingual tablet    NITROSTAT    25 tablet    For chest pain place 1 tablet under the tongue every 5 minutes for 3 doses. If symptoms persist 5 minutes after 1st dose call 911.    Atrial flutter, unspecified type (H)       nystatin cream    MYCOSTATIN     Apply topically 2 times daily        order for DME     1 Device    Equipment being ordered: Critical access hospital shoe    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), Diabetic ulcer of left foot due to type 2 diabetes mellitus (H)       polyethylene glycol Packet    MIRALAX/GLYCOLAX    7 packet    Take 17 g by mouth daily as needed for constipation    Constipation,  unspecified constipation type       ranitidine 150 MG tablet    ZANTAC    60 tablet    Take 1 tablet (150 mg) by mouth 2 times daily    Gastroesophageal reflux disease without esophagitis       senna-docusate 8.6-50 MG per tablet    SENOKOT-S;PERICOLACE    100 tablet    Take 1-2 tablets by mouth 2 times daily as needed (constipation )    Constipation, unspecified constipation type       spironolactone 25 MG tablet    ALDACTONE    45 tablet    Take 0.5 tablets (12.5 mg) by mouth daily    Chronic systolic congestive heart failure (H)       tamsulosin 0.4 MG capsule    FLOMAX    60 capsule    Take 2 capsules (0.8 mg) by mouth At Bedtime    Renal insufficiency

## 2017-07-06 NOTE — NURSING NOTE
Chief Complaint   Patient presents with     RECHECK     follow up with hospital visit, bhargavi kelley       Initial /64  Pulse 87  Temp 97.4  F (36.3  C) (Oral) Estimated body mass index is 26.2 kg/(m^2) as calculated from the following:    Height as of 6/8/17: 1.829 m (6').    Weight as of 6/8/17: 87.6 kg (193 lb 3.2 oz).  Medication Reconciliation: complete

## 2017-07-06 NOTE — PROGRESS NOTES
Chief Complaint:    Chief Complaint   Patient presents with     Follow Up For     Return CORE; 76yr old male wih a h/o CAD, Aflutter and chronic systolic HF secondary to ICM presenting for HF follow-up with labs prior.         HPI:  Chief Complaint:    Chief Complaint   Patient presents with     Follow Up For     Return CORE; 76yr old male wih a h/o CAD, Aflutter and chronic systolic HF secondary to ICM presenting for HF follow-up with labs prior.         HPI:  Mr. Tad Manning is a 74-year-old gentleman with a history of coronary artery disease, ischemic cardiomyopathy, HFpEF, hypertension, hyperlipidemia, atrial flutter, diabetes mellitus, anemia, status post left popliteal bypass in 01/2014, had a second toe amputation at the same time.  I saw him in clinic on 6/8/17.  At that clinic appointment I started spironolactone 12.5 mg in am and d/jillian his KCL. [facility]. He returns to clinic today with his brother.  He was seen in ID clinic today and had amoxicillin d/jillian.    He had a paraspinal abscess and a surgical decompression with IV antibiotic therapy in 02/2017.  A KASSIE at that time showed a left ventricular ejection fraction 25%-30%.  He was also seen by Dr. Valdes in our clinic 05/01/2017 started on furosemide p.o. every day.  He was seen by Dr. Max on 05/25, had a chest x-ray that revealed a left pleural effusion, unchanged.      REVIEW OF SYSTEMS:  He states that he is feeling well.  He is planning to stay at the [facility]CU for at least another 22 days.  Overall, he states that he is feeling well.  He continues to gain strength and endurance.   He can stand with assistance and can be weighed standing up.   He denies any shortness of breath at rest or with activity.  Denies PND.  He uses 2 pillows when he is sleeping.  Sleeping pattern is improving.  He denies any chest pain, angina, use of nitroglycerin, lightheaded, dizziness, fainting, falling,  ankle edema, nausea or vomiting.  He continues to wear LUDIVINA hose.  He has OT, PT on a daily basis.   He feels that his appetite is increasing.      HABITS:  He denies the use of tobacco, denies use of alcohol.  [facility] staff at [facility] check his blood sugar 3 times per day.     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)      Coronary artery disease     triple bypass 1995     Diabetes mellitus (H)      Diabetic polyneuropathy (H)      History of blood transfusion      Hyperlipidaemia      Hypertension      Malignant neoplasm (H)     CLL     Noninflammatory pericardial effusion      Osteomyelitis of left foot (H)      PVD (peripheral vascular disease) (H)      Sebaceous cyst        PAST SURGICAL HISTORY  Past Surgical History:   Procedure Laterality Date     AMPUTATE TOE(S)  1/10/2014    Procedure: AMPUTATE TOE(S);;  Surgeon: Amador Vick MD;  Location:  OR     BONE MARROW BIOPSY, BONE SPECIMEN, NEEDLE/TROCAR  10/19/2012    Procedure: BIOPSY BONE MARROW;  BONE MARROW BIOPSY  (CONSCIOUS SED);  Surgeon: Ramon Quesada MD;  Location:  GI     BYPASS GRAFT FEMOROPOPLITEAL  1/10/2014    Procedure: BYPASS GRAFT FEMOROPOPLITEAL;  Left above knee popliteal to  Below knee popliteal bypass using transverse saphenous vein graft. Left 2nd Toe amputation;  Surgeon: Amador Vick MD;  Location:  OR     CARDIAC SURGERY      angioplasty with stent, triple bypass     EXCISE CYST GENERIC (LOCATION) N/A 2/1/2016    Procedure: EXCISE CYST GENERIC (LOCATION);  Surgeon: Amador Vick MD;  Location:  SD     GRAFT VEIN FROM EXTREMITY (LOCATION)  1/10/2014    Procedure: GRAFT VEIN FROM EXTREMITY (LOCATION);;  Surgeon: Amador Vick MD;  Location:  OR     LAMINECTOMY  THORACIC ONE LEVEL N/A 2/8/2017    Procedure: LAMINECTOMY THORACIC ONE LEVEL;  Surgeon: Marcelo Trent MD;  Location: [facility] OR     OPTICAL TRACKING SYSTEM FUSION POSTERIOR SPINE THORACIC THREE+ LEVELS N/A 2/12/2017    Procedure: OPTICAL TRACKING SYSTEM FUSION POSTERIOR SPINE THORACIC THREE+ LEVELS;  Surgeon: Marcelo Trent MD;  Location: [facility] OR     TONSILLECTOMY       VASCULAR SURGERY      ptcr legs         FAMILY HISTORY:  Family History   Problem Relation Age of Onset     CANCER Mother      leukemia         SOCIAL HISTORY:  Social History     Social History     Marital status: Single     Spouse name: N/A     Number of children: N/A     Years of education: N/A     Social History Main Topics     Smoking status: Former Smoker     Packs/day: 2.00     Years: 30.00     Types: Cigarettes     Quit date: 6/19/1995     Smokeless tobacco: Never Used     Alcohol use Yes      Comment: 1 drink monthly     Drug use: No     Sexual activity: Not Asked     Other Topics Concern     None     Social History Narrative    Lives independently with SO. 2/9/2017        ALLERGIES  Allergies   Allergen Reactions     Blood Transfusion Related (Informational Only) Other (See Comments)     Patient has a history of a clinically significant antibody against RBC antigens.  A delay in compatible RBCs may occur.          CURRENT MEDICATIONS:    Current Outpatient Prescriptions on File Prior to Visit:  AMOXICILLIN PO Take 500 mg by mouth 3 times daily   Citalopram Hydrobromide (CELEXA PO) Take 10 mg by mouth daily   spironolactone (ALDACTONE) 25 MG tablet Take 0.5 tablets (12.5 mg) by mouth daily   nystatin (MYCOSTATIN) cream Apply topically 2 times daily   aspirin 325 MG tablet Take 1 tablet (325 mg) by mouth daily   atorvastatin (LIPITOR) 40 MG tablet Take 1 tablet (40 mg) by mouth daily   clopidogrel (PLAVIX) 75 MG tablet Take 1 tablet (75 mg) by mouth daily   metFORMIN (GLUCOPHAGE) 500 MG tablet Take 1 tablet (500 mg) by mouth 2 times  daily (with meals)   metoprolol (LOPRESSOR) 25 MG tablet Take 3 tablets (75 mg) by mouth 2 times daily   polyethylene glycol (MIRALAX/GLYCOLAX) Packet Take 17 g by mouth daily as needed for constipation   ranitidine (ZANTAC) 150 MG tablet Take 1 tablet (150 mg) by mouth 2 times daily   senna-docusate (SENOKOT-S;PERICOLACE) 8.6-50 MG per tablet Take 1-2 tablets by mouth 2 times daily as needed (constipation )   benzonatate (TESSALON) 100 MG capsule Take 1 capsule (100 mg) by mouth 3 times daily as needed for cough   cholecalciferol 1000 UNITS TABS Take 1,000 Units by mouth daily   tamsulosin (FLOMAX) 0.4 MG capsule Take 2 capsules (0.8 mg) by mouth At Bedtime   nitroglycerin (NITROSTAT) 0.4 MG sublingual tablet For chest pain place 1 tablet under the tongue every 5 minutes for 3 doses. If symptoms persist 5 minutes after 1st dose call 911.   order for DME Equipment being ordered: [facility] shoe   [DISCONTINUED] furosemide (LASIX) 40 MG tablet Take 40 mg by mouth daily If weight goes up 2.5 lbs, pt has to take another pill in the PM.   [DISCONTINUED] lisinopril (PRINIVIL/ZESTRIL) 10 MG tablet Take 1 tablet (10 mg) by mouth daily (Patient taking differently: Take 5 mg by mouth daily )     No current facility-administered medications on file prior to visit.     ROS:   Constitutional: No fever, chills, or sweats. No weight gain/loss.   ENT: No visual disturbance, ear ache, epistaxis, sore throat.   Allergies/Immunologic: Negative.   Respiratory: No cough, hemoptysis.   Cardiovascular: As per HPI.   GI: No nausea, vomiting, hematemesis, melena  : No urinary frequency, dysuria, or hematuria.   Integument: Negative.   Psychiatric: Negative.   Neuro: Negative.   Endocrinology: Negative.   Musculoskeletal: Negative.    EXAM:  /64 (BP Location: Left arm, Patient Position: Chair, Cuff Size: Adult Regular)  Pulse 87  Ht 1.829 m (6')  Wt 80.6 kg (177 lb 11.2 oz)  SpO2 99%  BMI 24.1 kg/m2  Home weight  General: Elderly man  in NAD. appears comfortable, alert and articulate, sitting in WC  Head: normocephalic, atraumatic  Eyes: anicteric sclera, EOMI  Neck: no adenopathy, no carotid bruits noted bilaterally  Orophyarynx: moist mucosa, no lesions, dentition intact  Heart: regular, S1/S2, no murmur, gallop, rub, no JVD noted  Lungs: bilaterally clear, without crackles, rhonchi or wheezes bilaterally  Abdomen: soft, non-tender, bowel sounds present, no hepatomegaly  Extremities: no clubbing, cyanosis or edema, wearing sophy hose  Neurological: normal speech and affect, no gross motor deficits  Skin:  No rashes or lesions noted      LCMP RESULTS:  Lab Results   Component Value Date     07/06/2017    POTASSIUM 3.9 07/06/2017    CHLORIDE 100 07/06/2017    CO2 28 07/06/2017    ANIONGAP 8 07/06/2017     (H) 07/06/2017    BUN 23 07/06/2017    CR 0.88 07/06/2017    GFRESTIMATED 85 07/06/2017    GFRESTBLACK >90   GFR Calc   07/06/2017    JUSTINO 9.1 07/06/2017    BILITOTAL 0.2 04/06/2017    ALBUMIN 2.4 (L) 04/06/2017    ALKPHOS 154 (H) 04/06/2017    ALT 17 04/06/2017    AST 10 04/06/2017      Lab Results   Component Value Date    A1C 6.1 (H) 02/10/2017     Lab Results   Component Value Date    CHOL 134 05/23/2014     Lab Results   Component Value Date    HDL 34 05/23/2014     Lab Results   Component Value Date    LDL 48 05/23/2014     Lab Results   Component Value Date    TRIG 257 05/23/2014     Lab Results   Component Value Date    CHOLHDLRATIO 3.9 05/23/2014       INR RESULTS:  Lab Results   Component Value Date    INR 1.22 (H) 02/12/2017       No components found for: CK  Lab Results   Component Value Date    MAG 1.4 (L) 04/08/2017     Lab Results   Component Value Date    NTBNPI 11738 (H) 04/06/2017       Most recent echocardiogram:  Recent Results (from the past 8760 hour(s))   Echo limited with definity    Narrative    [id number]  [id number]  [id number]  [id number]^LUIGI^YANELY^           [facility]  [facility]  Echocardiography Laboratory  [address] [place] [id number]     Name: PETER BUTLER  MRN: [id number]  : 1940  Study Date: 2017 12:24 PM  Age: 76 yrs  Gender: Male  Patient Location: [facility]  Reason For Study: Arrhythmia  Ordering Physician: YANELY MEYERS  Performed By: DO Austin     BSA: 2.1 m2  Height: 72 in  Weight: 190 lb  BP: 128/64 mmHg  _____________________________________________________________________________  __        Procedure  Complete Portable Echo Adult. Contrast Optison. Technically difficult study.  Optison (NDC #5564-3961-55) given intravenously. Patient was given 6 ml  mixture of 3 ml Optison and 6 ml saline. 3 ml wasted. IV start location R  Upper arm . The patient's rhythm is atrial fibrillation.  _____________________________________________________________________________  __        Interpretation Summary  Technically difficult study.The patient's rhythm is atrial fibrillation.  The left ventricle is severely dilated. Left ventricular systolic function is  severely reduced, ejection fraction is estimated at 25-30%. There is severe  global hypokinesis.  Right ventricular systolic function is mildly reduced.  The right ventricular systolic pressure is approximated at 15 mmHg plus the  right atrial pressure. IVC is plethoric and estimated mean RA pressure is 15  mmHg.  No pericardial effusion present.  _____________________________________________________________________________  __     _____________________________________________________________________________  __     MMode/2D Measurements & Calculations     EF(MOD-bp): 30.0 %        Doppler Measurements & Calculations  TR max alessia: 190.0 cm/sec  TR max P.4 mmHg           _____________________________________________________________________________  __           Report approved by: Edward Snyder 2017 05:09 PM      ECHO COMPLETE WITH OPTISON    Narrative     [id number]  [facility]  [id number]  [id number]^ELOISE^CHARLIE^VALARIE           [facility]  Echocardiography Laboratory  [address] [place] [address]        Name: PETER BUTLER  MRN: [id number]  : 1940  Study Date: 2017 12:38 PM  Age: 76 yrs  Gender: Male  Patient Location: [facility]^[id number]^  Reason For Study: Syncope  Ordering Physician: CHARLIE NAVAS  Referring Physician: CHARLIE NAVAS  Performed By: Liana Lamb     BSA: 2.0 m2  Height: 72 in  Weight: 180 lb  HR: 80  BP: 104/50 mmHg  _____________________________________________________________________________  __        Procedure  Complete Portable Echo Adult. Contrast Optison.  _____________________________________________________________________________  __        Interpretation Summary     The left ventricle is mildly dilated at 6.0 cm. Left ventricular systolic  function is mild to moderately reduced as the visual ejection fraction is  estimated at 40-45%.  The biplane calculated LVEF = 43%. This decrease in the LVEF is due to mild  global hypokinesia and moderate to severe inferolateral wall hypokinesis.  Grade I or early diastolic dysfunction is noted  There is trace tricuspid regurgitation. The right ventricular systolic  pressure is normal approximated at 16mmHg plus the right atrial pressure.  The left atrium is moderately dilated by volume criteria at 41.6 ml/m2.  There is mild trileaflet aortic sclerosis but no aortic stenosis or  regurgitation is present.  _____________________________________________________________________________  __        Left Ventricle  The left ventricle is mildly dilated. at 6.0 cm. There is normal left  ventricular wall thickness. Grade I or early diastolic dysfunction. E by E  prime ratio is less than 8, that likely suggests normal left ventricular  filling pressures. The visual ejection fraction is estimated at 40-45%. The  biplane calculated LVEF = 43%. Left  ventricular systolic function is mild to  moderately reduced. There is mild global hypokinesia of the left ventricle.  There is moderate to severe inferolateral wall hypokinesis. There is no  thrombus seen in the left ventricle.     Right Ventricle  Normal right ventricle structure and size. The right ventricular systolic  function is mildly reduced.     Atria  The left atrium is moderately dilated. Left atrial enlargement is noted by  volume criteria. at 41.6 ml/m2. Right atrial size is normal. Intact atrial  septum.     Mitral Valve  There is mild to moderate mitral annular calcification. There is no evidence  of mitral valve prolapse. There is no mitral valve stenosis.        Tricuspid Valve  The tricuspid valve is normal in structure and function. Right ventricle  systolic pressure estimate normal. There is trace tricuspid regurgitation. The  right ventricular systolic pressure is approximated at 16.6 mmHg plus the  right atrial pressure. The right ventricular systolic pressure is approximated  at 16mmHg plus the right atrial pressure.     Aortic Valve  The aortic valve is normal in structure and function. There is mild trileaflet  aortic sclerosis. No aortic regurgitation is present. No aortic stenosis is  present.     Pulmonic Valve  The pulmonic valve is not well seen, but is grossly normal. There is trace  pulmonic valvular regurgitation.     Vessels  Normal size aorta. The inferior vena cava is not dilated.     Pericardium  The pericardium appears normal. There is no pleural effusion.        Rhythm  The rhythm was normal sinus.  _____________________________________________________________________________  __  MMode/2D Measurements & Calculations  IVSd: 0.65 cm     LVIDd: 5.9 cm  LVIDs: 4.8 cm  LVPWd: 0.76 cm  FS: 18.2 %  EDV(Teich): 173.1 ml  ESV(Teich): 108.9 ml  LV mass(C)d: 154.2 grams  Ao root diam: 3.6 cm  LA dimension: 4.2 cm  asc Aorta Diam: 3.4 cm  LA/Ao: 1.2  LA Volume (BP): 84.8 ml  LA Volume  Index (BP): 41.6 ml/m2  TAPSE: 1.0 cm           Doppler Measurements & Calculations  MV E max alessia: 39.1 cm/sec  MV A max alessia: 67.1 cm/sec  MV E/A: 0.58  MV dec time: 0.22 sec  TR max alessia: 203.6 cm/sec  TR max P.6 mmHg  Lateral E/e': 2.8  Medial E/e': 5.5           _____________________________________________________________________________  __           Report approved by: Edward Barry 2017 04:25 PM          Assessment  ASSESSMENT:   1.  Chronic systolic heart failure secondary to ischemic cardiomyopathy, decreased left ventricular function with most recent ejection fraction 25%-30%.  He is New York Heart Association class II.  He continues to be on 10 mg of lisinopril, metoprolol 75 mg p.o. b.i.d.,  furosemide 40 mg every day.  He takes an additional 20 mg in the p.m. afternoon if his weight is up 3-5 pounds. SCD prophylaxis.  Spironolactone 12.5 mg every day. The patient does not have an ICD or defibrillator.  Fluid status:  Euvolemic.  NSAIDs:  Currently not using.  Sleep evaluation:  Not applicable.  Cardiac rehab:  He is attending OT, PT.   2.  Coronary artery disease, currently not complaining of chest pain.  He continues to be on Plavix.  States that he has 3 stents in his legs, 1 stent in his heart.  I do not know if this is planned for long term or if there is an end date to Plavix.   3.  Hypertension.  Blood pressure is well controlled.  He does run a systolic blood pressure around 100 mmHg.   4.  Hyperlipidemia.  Continues to be on 40 mg of atorvastatin.   5.  Atrial flutter.  He continues to be on aspirin and metoprolol.  He is not on an anticoagulant.  His CHADS-VASc score is 4.  Warfarin has not been restarted due to his increased risk of falling and not being able to bear weight on his left lower extremity.   6.  Diabetes mellitus type 2.   7.  History of anemia.   8.  Peripheral arterial disease, status post left popliteal bypass complicated with osteomyelitis, and second toe  amputation in 01/2014, renal insufficiency.      PLAN:   1.  Decrease furosemide from 40 mg BID to 40 mg every day.   2,. Return to clinic with Dr. Valdes as scheduled 8/28/17.  3.  RTC with Jess Chi CNP in [facility] clinic 8/14/17   3.  As documented in prior progress notes, the patient should have an ischemic workup but due to his increased risk of falling and other comorbidities, this has been put on hold and he is being treated medically at this time until he is stronger and has less risk of falling.   4.  Labs were reviewed from today's clinic.      The patient appears to be much stronger than he was 4 weeks ago.        Thanks for allowing us to be involved in his care, feel free to call if questions or concerns.     Magnolia BERMUDEZ, OFELIA, CNP  Clinical Nurse Specialist  [facility]/Advanced Heart Failure/Mechanical Circulatory Support  Pager

## 2017-07-06 NOTE — MR AVS SNAPSHOT
After Visit Summary   7/6/2017    Tad Manning    MRN: [id number]           Patient Information     Date Of Birth          1940        Visit Information        Provider Department      7/6/2017 9:30 AM Gillian Max MD [facility] and Infectious Diseases        Care Instructions    1. Stop amoxicillin today  2. If any new redness or drainage from incision, new pain, or fevers occur, notify the infectious diseases clinic immediately or go to the emergency room if on the weekend.           Follow-ups after your visit        Your next 10 appointments already scheduled     Aug 07, 2017  1:00 PM CDT   (Arrive by 12:45 PM)   Return Visit with Clarisse Valdes MD   [facility] ([facility])    [address] [place] [address]  3rd [facility] [id number] [phone]            Sep 18, 2017  8:00 AM CDT   XR SPINE COMPLETE 2 VIEWS with [email or web address]   [facility] X[facility] ([facility])    [address] [place] [address] [id number] [phone]           Please bring a list of your current medicines to your exam. (Include vitamins, minerals and over-thecounter medicines.) Leave your valuables at home.  Tell your doctor if there is a chance you may be pregnant.  You do not need to do anything special for this exam.            Sep 18, 2017  8:15 AM CDT   XR THORACIC SPINE 2 VIEWS with [email or web address]   [facility] ([facility])    [address] [place] [address] [id number] [phone]           Please bring a list of your current medicines to your exam. (Include vitamins, minerals and over-thecounter medicines.) Leave your valuables at home.  Tell your doctor if there is a chance you may be pregnant.  You do not need to do anything special for this exam.            Sep 18, 2017  8:30 AM CDT   (Arrive by 8:15 AM)   Return Visit with Marcelo Trent MD     [facility] ([facility] [place])    [address]  3rd Floor  [facility] [address] [phone]              Who to contact     If you have questions or need follow up information about today's clinic visit or your schedule please contact [facility] INFECTIOUS DISEASES directly at [phone].  Normal or non-critical lab and imaging results will be communicated to you by MyChart, letter or phone within 4 business days after the clinic has received the results. If you do not hear from us within 7 days, please contact the clinic through [employer] or phone. If you have a critical or abnormal lab result, we will notify you by phone as soon as possible.  Submit refill requests through [employer] or call your pharmacy and they will forward the refill request to us. Please allow 3 business days for your refill to be completed.          Additional Information About Your Visit        [employer] Information     [employer] gives you secure access to your electronic health record. If you see a primary care provider, you can also send messages to your care team and make appointments. If you have questions, please call your primary care clinic.  If you do not have a primary care provider, please call [phone] and they will assist you.        Care EveryWhere ID     This is your Care EveryWhere ID. This could be used by other organizations to access your [facility] medical records  [id number]        Your Vitals Were     Pulse Temperature                87 97.4  F (36.3  C) (Oral)           Blood Pressure from Last 3 Encounters:   07/06/17 100/64   07/06/17 100/64   06/08/17 97/48    Weight from Last 3 Encounters:   06/08/17 87.6 kg (193 lb 3.2 oz)   05/25/17 88.5 kg (195 lb)   05/11/17 85 kg (187 lb 6.4 oz)              Today, you had the following     No orders found for display         Today's Medication Changes          These changes are accurate as of: 7/6/17 10:36 AM.  If you  have any questions, ask your nurse or doctor.               These medicines have changed or have updated prescriptions.        Dose/Directions    lisinopril 10 MG tablet   Commonly known as:  PRINIVIL/ZESTRIL   This may have changed:  how much to take   Used for:  Essential hypertension        Dose:  10 mg   Take 1 tablet (10 mg) by mouth daily   Quantity:  30 tablet   Refills:  0         Stop taking these medicines if you haven't already. Please contact your care team if you have questions.     acetaminophen 325 MG tablet   Commonly known as:  TYLENOL   Stopped by:  Gillian Max MD           insulin glargine 100 UNIT/ML injection   Commonly known as:  LANTUS   Stopped by:  Gillian Max MD           melatonin 3 MG tablet   Stopped by:  Gillian Max MD                    Primary Care Provider Office Phone # Fax #    Gene Guevara [phone]       [place] [address]        Equal Access to Services     [facility]: Hadii liana ku hadasho Soomaali, waaxda luqadaha, qaybta kaalmada adeegyada, waxay idiin hayaan teodoro howard . So [facility] [phone].    ATENCIÓN: Si fan espgerson, tiene a noe disposición servicios gratuitos de asistencia lingüística. [facility] [phone].    We comply with applicable federal civil rights laws and [place] laws. We do not discriminate on the basis of race, color, national origin, age, disability sex, sexual orientation or gender identity.            Thank you!     Thank you for choosing [facility] INFECTIOUS DISEASES  for your care. Our goal is always to provide you with excellent care. Hearing back from our patients is one way we can continue to improve our services. Please take a few minutes to complete the written survey that you may receive in the mail after your visit with us. Thank you!             Your Updated Medication List - Protect others around you: Learn how to safely  use, store and throw away your medicines at www.disposemymeds.org.          This list is accurate as of: 7/6/17 10:36 AM.  Always use your most recent med list.                   Brand Name Dispense Instructions for use Diagnosis    AMOXICILLIN PO      Take 500 mg by mouth 3 times daily        aspirin 325 MG tablet     120 tablet    Take 1 tablet (325 mg) by mouth daily    Acute midline low back pain, with sciatica presence unspecified       atorvastatin 40 MG tablet    LIPITOR    30 tablet    Take 1 tablet (40 mg) by mouth daily    Claudication of left lower extremity (H)       benzonatate 100 MG capsule    TESSALON    42 capsule    Take 1 capsule (100 mg) by mouth 3 times daily as needed for cough    Acute on chronic systolic congestive heart failure (H)       CELEXA PO      Take 10 mg by mouth daily        cholecalciferol 1000 UNITS Tabs     30 tablet    Take 1,000 Units by mouth daily    Loop diuretic causing adverse effect in therapeutic use, subsequent encounter       clopidogrel 75 MG tablet    PLAVIX    30 tablet    Take 1 tablet (75 mg) by mouth daily    Atrial flutter, unspecified type (H)       LASIX 40 MG tablet   Generic drug:  furosemide      Take 40 mg by mouth daily If weight goes up 2.5 lbs, pt has to take another pill in the PM.        lisinopril 10 MG tablet    PRINIVIL/ZESTRIL    30 tablet    Take 1 tablet (10 mg) by mouth daily    Essential hypertension       metFORMIN 500 MG tablet    GLUCOPHAGE    60 tablet    Take 1 tablet (500 mg) by mouth 2 times daily (with meals)    Type 2 diabetes mellitus with diabetic polyneuropathy, without long-term current use of insulin (H)       metoprolol 25 MG tablet    LOPRESSOR    60 tablet    Take 3 tablets (75 mg) by mouth 2 times daily    Atrial flutter, unspecified type (H)       nitroGLYcerin 0.4 MG sublingual tablet    NITROSTAT    25 tablet    For chest pain place 1 tablet under the tongue every 5 minutes for 3 doses. If symptoms persist 5 minutes after  1st dose call 911.    Atrial flutter, unspecified type (H)       nystatin cream    MYCOSTATIN     Apply topically 2 times daily        order for DME     1 Device    Equipment being ordered: DH2 shoe    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), Diabetic ulcer of left foot due to type 2 diabetes mellitus (H)       polyethylene glycol Packet    MIRALAX/GLYCOLAX    7 packet    Take 17 g by mouth daily as needed for constipation    Constipation, unspecified constipation type       ranitidine 150 MG tablet    ZANTAC    60 tablet    Take 1 tablet (150 mg) by mouth 2 times daily    Gastroesophageal reflux disease without esophagitis       senna-docusate 8.6-50 MG per tablet    SENOKOT-S;PERICOLACE    100 tablet    Take 1-2 tablets by mouth 2 times daily as needed (constipation )    Constipation, unspecified constipation type       spironolactone 25 MG tablet    ALDACTONE    45 tablet    Take 0.5 tablets (12.5 mg) by mouth daily    Chronic systolic congestive heart failure (H)       tamsulosin 0.4 MG capsule    FLOMAX    60 capsule    Take 2 capsules (0.8 mg) by mouth At Bedtime    Renal insufficiency

## 2017-07-07 NOTE — PROGRESS NOTES
[facility]  Follow-Up Visit  7/6/2017     Chief Complaint: Vertebral osteomyelitis     HPI:  Mr. Manning is a 77 yo gentleman with PMH CLL in remission, DMII, PVD admitted as transfer 2/9/2017 with thoracic back pain and LE weakness - found to have T9-10 discitis with overlying epidural abscess, now s/p laminectomy/abscess evacuation with intraoperative cultures and  blood cultures showing Streptococcus mitis (oral himanshu). He then also had late growth (day 6) on one operative cultures of mixed anaerobes as well.  TTE was negative for endocarditis. He was started on ceftriaxone Q12H with plans for 6-12 weeks of treatment. He completed 9 weeks of IV therapy and is slowly regaining his leg strength. He had increasing CRP just prior to discharge from the [facility] to a TCU without a clear cause so we added metronidazole to his ceftriaxone. He completed 9 weeks of IV ceftriaxone and 4 weeks of metronidazole and has been on suppressive amoxicillin for the two months due to hardware placed early in the infection.     7/6/17 Update:   Mr. Manning is doing well. He continues to slowly regain strength although is still requiring a lot of assistance. His CRP normalized before stopping IV antibiotics and he has had no trouble with amoxicillin. He would be open to a trial off antibiotics since he doesn't have a compelling need for ongoing suppression.     ROS: 4 point ROS including Respiratory, CV, GI and skin, other than that noted in the HPI,  is negative      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)      Coronary artery disease     triple bypass 1995     Diabetes mellitus (H)      Diabetic polyneuropathy (H)      History of blood transfusion       Hyperlipidaemia      Hypertension      Malignant neoplasm (H)     CLL     Noninflammatory pericardial effusion      Osteomyelitis of left foot (H)      PVD (peripheral vascular disease) (H)      Sebaceous cyst      Medications:  Current Outpatient Prescriptions   Medication Sig Dispense Refill     AMOXICILLIN PO Take 500 mg by mouth 3 times daily       Citalopram Hydrobromide (CELEXA PO) Take 10 mg by mouth daily       spironolactone (ALDACTONE) 25 MG tablet Take 0.5 tablets (12.5 mg) by mouth daily 45 tablet 3     nystatin (MYCOSTATIN) cream Apply topically 2 times daily       aspirin 325 MG tablet Take 1 tablet (325 mg) by mouth daily 120 tablet 0     atorvastatin (LIPITOR) 40 MG tablet Take 1 tablet (40 mg) by mouth daily 30 tablet 1     clopidogrel (PLAVIX) 75 MG tablet Take 1 tablet (75 mg) by mouth daily 30 tablet      metFORMIN (GLUCOPHAGE) 500 MG tablet Take 1 tablet (500 mg) by mouth 2 times daily (with meals) 60 tablet      metoprolol (LOPRESSOR) 25 MG tablet Take 3 tablets (75 mg) by mouth 2 times daily 60 tablet      polyethylene glycol (MIRALAX/GLYCOLAX) Packet Take 17 g by mouth daily as needed for constipation 7 packet 0     ranitidine (ZANTAC) 150 MG tablet Take 1 tablet (150 mg) by mouth 2 times daily 60 tablet      senna-docusate (SENOKOT-S;PERICOLACE) 8.6-50 MG per tablet Take 1-2 tablets by mouth 2 times daily as needed (constipation ) 100 tablet 0     benzonatate (TESSALON) 100 MG capsule Take 1 capsule (100 mg) by mouth 3 times daily as needed for cough 42 capsule      cholecalciferol 1000 UNITS TABS Take 1,000 Units by mouth daily 30 tablet      tamsulosin (FLOMAX) 0.4 MG capsule Take 2 capsules (0.8 mg) by mouth At Bedtime 60 capsule      nitroglycerin (NITROSTAT) 0.4 MG sublingual tablet For chest pain place 1 tablet under the tongue every 5 minutes for 3 doses. If symptoms persist 5 minutes after 1st dose call 911. 25 tablet      lisinopril (PRINIVIL/ZESTRIL) 10 MG tablet Take 1 tablet (10  mg) by mouth daily 30 tablet      furosemide (LASIX) 40 MG tablet Take 1 tablet (40 mg) by mouth daily If weight goes up 4-5 lbs, take an additional 1/2 tablet (20 mg) at 2 pm 30 tablet      order for DME Equipment being ordered: DH2 shoe 1 Device 0     Exam:  /64  Pulse 87  Temp 97.4  F (36.3  C) (Oral)   Gen: Alert and in no distress.   Psych: Normal affect. Alert and oriented.   HEENT: PERRL. No icterus. Oropharynx pink and moist without lesions.   Extremities: Mild peripheral edema.   Skin: No rashes or lesions noted.     Labs:  WBC   Date Value Ref Range Status   05/25/2017 6.7 4.0 - 11.0 10e9/L Final     CRP Inflammation   Date Value Ref Range Status   05/25/2017 6.4 0.0 - 8.0 mg/L Final   04/13/2017 7.5 0.0 - 8.0 mg/L Final   04/07/2017 17.0 (H) 0.0 - 8.0 mg/L Final   03/15/2017 81.0 (H) 0.0 - 8.0 mg/L Final   03/13/2017 74.7 (H) 0.0 - 8.0 mg/L Final      Assessment and Plan:  Mr. Manning is doing well after 9 weeks of ceftriaxone and 4 weeks of metronidazole (late growth of mixed anaerobes on one culture) followed by 2 months of oral amoxicillin. He had normalization of his CRP prior to stopping IV therapy. Since he is doing well and strep mitis is not usually a robust biofilm former, we will stop amoxicillin today and monitor off antibiotics.    Plan:   1. Stop amoxicillin today  2. If any new redness or drainage from incision, new pain, or fevers occur, notify the infectious diseases clinic immediately or go to the emergency room if on the weekend.     Return to clinic PRN.    Gillian Max MD  Infectious Diseases  [phone]

## 2017-07-12 ENCOUNTER — TRANSFERRED RECORDS (OUTPATIENT)
Dept: HEALTH INFORMATION MANAGEMENT | Facility: CLINIC | Age: 77
End: 2017-07-12

## 2017-08-09 ENCOUNTER — PRE VISIT (OUTPATIENT)
Dept: CARDIOLOGY | Facility: CLINIC | Age: 77
End: 2017-08-09

## 2017-08-09 DIAGNOSIS — I50.22 CHRONIC SYSTOLIC HEART FAILURE (H): Primary | ICD-10-CM

## 2017-08-14 ENCOUNTER — OFFICE VISIT (OUTPATIENT)
Dept: CARDIOLOGY | Facility: CLINIC | Age: 77
End: 2017-08-14
Attending: NURSE PRACTITIONER
Payer: MEDICARE

## 2017-08-14 VITALS
OXYGEN SATURATION: 97 % | WEIGHT: 183.3 LBS | HEART RATE: 82 BPM | DIASTOLIC BLOOD PRESSURE: 53 MMHG | BODY MASS INDEX: 24.83 KG/M2 | SYSTOLIC BLOOD PRESSURE: 109 MMHG | HEIGHT: 72 IN

## 2017-08-14 DIAGNOSIS — I50.22 CHRONIC SYSTOLIC HEART FAILURE (H): ICD-10-CM

## 2017-08-14 DIAGNOSIS — I50.22 CHRONIC SYSTOLIC HEART FAILURE (H): Primary | ICD-10-CM

## 2017-08-14 LAB
ANION GAP SERPL CALCULATED.3IONS-SCNC: 10 MMOL/L (ref 3–14)
BUN SERPL-MCNC: 26 MG/DL (ref 7–30)
CALCIUM SERPL-MCNC: 9.3 MG/DL (ref 8.5–10.1)
CHLORIDE SERPL-SCNC: 103 MMOL/L (ref 94–109)
CO2 SERPL-SCNC: 24 MMOL/L (ref 20–32)
CREAT SERPL-MCNC: 1.03 MG/DL (ref 0.66–1.25)
GFR SERPL CREATININE-BSD FRML MDRD: 70 ML/MIN/1.7M2
GLUCOSE SERPL-MCNC: 132 MG/DL (ref 70–99)
POTASSIUM SERPL-SCNC: 4.3 MMOL/L (ref 3.4–5.3)
SODIUM SERPL-SCNC: 137 MMOL/L (ref 133–144)

## 2017-08-14 PROCEDURE — 36415 COLL VENOUS BLD VENIPUNCTURE: CPT | Performed by: NURSE PRACTITIONER

## 2017-08-14 PROCEDURE — 80048 BASIC METABOLIC PNL TOTAL CA: CPT | Performed by: NURSE PRACTITIONER

## 2017-08-14 PROCEDURE — 99212 OFFICE O/P EST SF 10 MIN: CPT | Mod: ZF

## 2017-08-14 PROCEDURE — 99213 OFFICE O/P EST LOW 20 MIN: CPT | Mod: ZP | Performed by: NURSE PRACTITIONER

## 2017-08-14 RX ORDER — FUROSEMIDE 20 MG
20 TABLET ORAL PRN
COMMUNITY
End: 2017-08-28 | Stop reason: DRUGHIGH

## 2017-08-14 RX ORDER — METOPROLOL SUCCINATE 200 MG/1
200 TABLET, EXTENDED RELEASE ORAL DAILY
Qty: 90 TABLET | Refills: 3 | Status: SHIPPED | OUTPATIENT
Start: 2017-08-14 | End: 2018-05-18

## 2017-08-14 RX ORDER — ACETAMINOPHEN 325 MG/1
325 TABLET ORAL EVERY 4 HOURS PRN
COMMUNITY
End: 2018-10-24

## 2017-08-14 ASSESSMENT — PAIN SCALES - GENERAL: PAINLEVEL: NO PAIN (0)

## 2017-08-14 NOTE — MR AVS SNAPSHOT
"              After Visit Summary   2017    Tad Manning    MRN: [id number]           Patient Information     Date Of Birth          1940        Visit Information        Provider Department      2017 11:00 AM Jess Chi NP [facility]        Today's Diagnoses     Chronic systolic heart failure (H)    -  1      Care Instructions    You were seen today in the Cardiovascular Clinic at the [facility].     Cardiology Providers you saw during your visit: Jess Chi      1. Please stop taking Lopressor (metoprolol tartrate)    2. Begin taking Metoprolol SUCCINATE 200 mg once daily    3.  Please return to [facility] on  at 10 to see Jess. Call if you have any concerns in the meantime.      Please limit your fluid intake to 2 L (64 ounces) daily.  2 Liters a day = 8.5 cups, or 72 ounces.  Please limit your salt intake to 2 grams a day or less.    If you gain 2# in 24 hours or 5# in one week call Nicole Hollingsworth RN so we can adjust your medications as needed over the phone.    Please feel free to call me with any questions or concerns.      Nicole Hollingsworth RN BSN CHFN  [facility]  Cardiology Care Coordinator-Heart Failure Clinic    Questions and [phone].   First press #1 for the [facility] and then press #3 for \"Medical Questions\" to reach us Cardiology Nurses.     On Call Cardiologist for after hours or on weekends: [phone]   option #4 and ask to speak to the on-call Cardiologist. Inform them you are a CORE/heart failure patient at the [facility].        If you need a medication refill please contact your pharmacy.  Please allow 3 business days for your refill to be completed.  _______________________________________________________  C.O.R.E. CLINIC Cardiomyopathy, Optimization, Rehabilitation, Education   The C.O.R.E. CLINIC is a heart failure specialty clinic within the [facility] " where you will work with specialized nurse practitioners dedicated to helping patients with heart failure carefully adjust medications, receive education, and learn who and when to call if symptoms develop. They specialize in helping you better understand your condition, slow the progression of your disease, improve the length and quality of your life, help you detect future heart problems before they become life threatening, and avoid hospitalizations.  As always, thank you for trusting us with your health care needs!                Follow-ups after your visit        Follow-up notes from your care team     Return in 5 weeks (on 9/18/2017).      Your next 10 appointments already scheduled     Sep 18, 2017  8:00 AM CDT   XR SPINE COMPLETE 2 VIEWS with XR1   [facility]    [address] [place] [address] [phone]           Please bring a list of your current medicines to your exam. (Include vitamins, minerals and over-thecounter medicines.) Leave your valuables at home.  Tell your doctor if there is a chance you may be pregnant.  You do not need to do anything special for this exam.            Sep 18, 2017  8:15 AM CDT   XR THORACIC SPINE 2 VIEWS with UCXR1   [facility] ([facility])    [address] [address] [phone]           Please bring a list of your current medicines to your exam. (Include vitamins, minerals and over-thecounter medicines.) Leave your valuables at home.  Tell your doctor if there is a chance you may be pregnant.  You do not need to do anything special for this exam.            Sep 18, 2017  8:30 AM CDT   (Arrive by 8:15 AM)   Return Visit with Marcelo Trent MD   [facility] ([facility])    [address] [place] [id number] [phone]            Sep 18, 2017  10:00 AM CDT   (Arrive by 9:45 AM)   CORE RETURN with Jess Chi NP   [facility] ([facility])    [address]  3rd Floor  [facility] [address] [phone]            Sep 19, 2017  9:00 AM CDT   Lab with [facility]   [facility] Lab ([facility])    [address]  [address] [facility] [address] [phone]              Who to contact     If you have questions or need follow up information about today's clinic visit or your schedule please contact [facility] directly at [phone].  Normal or non-critical lab and imaging results will be communicated to you by CytoPherxhart, letter or phone within 4 business days after the clinic has received the results. If you do not hear from us within 7 days, please contact the clinic through ChoiceMapt or phone. If you have a critical or abnormal lab result, we will notify you by phone as soon as possible.  Submit refill requests through Magazino or call your pharmacy and they will forward the refill request to us. Please allow 3 business days for your refill to be completed.          Additional Information About Your Visit        CytoPherxhart Information     Magazino gives you secure access to your electronic health record. If you see a primary care provider, you can also send messages to your care team and make appointments. If you have questions, please call your primary care clinic.  If you do not have a primary care provider, please call [phone] and they will assist you.        Care EveryWhere ID     This is your Care EveryWhere ID. This could be used by other organizations to access your [place] medical records  [id number]        Your Vitals Were     Pulse Height Pulse Oximetry BMI (Body Mass Index)          82 1.829 m (6') 97% 24.86 kg/m2         Blood Pressure from Last 3 Encounters:   08/14/17 109/53   07/06/17 100/64   07/06/17 100/64    Weight from Last 3 Encounters:    08/14/17 83.1 kg (183 lb 4.8 oz)   07/06/17 80.6 kg (177 lb 11.2 oz)   06/08/17 87.6 kg (193 lb 3.2 oz)              Today, you had the following     No orders found for display         Today's Medication Changes          These changes are accurate as of: 8/14/17 11:35 AM.  If you have any questions, ask your nurse or doctor.               Start taking these medicines.        Dose/Directions    metoprolol 200 MG 24 hr tablet   Commonly known as:  TOPROL-XL   Used for:  Chronic systolic heart failure (H)   Started by:  Jess Chi NP        Dose:  200 mg   Take 1 tablet (200 mg) by mouth daily   Quantity:  90 tablet   Refills:  3         Stop taking these medicines if you haven't already. Please contact your care team if you have questions.     metoprolol 25 MG tablet   Commonly known as:  LOPRESSOR   Stopped by:  Jess Chi NP                Where to get your medicines      These medications were sent to [facility] [address] [place] [address] N [address] [facility] [address]     Phone:  [phone]     metoprolol 200 MG 24 hr tablet                Primary Care Provider Office Phone # Fax #    Gene Guevara [phone]       [address] [address]        Equal Access to Services     [facility]ER [facility] AH: Hadii liana ku hadasho Soomaali, waaxda luqadaha, qaybta kaalmada adeegyada, deandre del cid. So [facility] [phone].    ATENCIÓN: Si habla español, tiene a noe disposición servicios gratuitos de asistencia lingüística. Joey barrientos [phone].    We comply with applicable federal civil rights laws and [place] laws. We do not discriminate on the basis of race, color, national origin, age, disability sex, sexual orientation or gender identity.            Thank you!     Thank you for choosing [facility]  for your care. Our goal is always to provide  you with excellent care. Hearing back from our patients is one way we can continue to improve our services. Please take a few minutes to complete the written survey that you may receive in the mail after your visit with us. Thank you!             Your Updated Medication List - Protect others around you: Learn how to safely use, store and throw away your medicines at www.disposemymeds.org.          This list is accurate as of: 8/14/17 11:35 AM.  Always use your most recent med list.                   Brand Name Dispense Instructions for use Diagnosis    acetaminophen 325 MG tablet    TYLENOL     Take 325 mg by mouth every 4 hours as needed for mild pain        AMOXICILLIN PO      Take 500 mg by mouth 3 times daily        aspirin 325 MG tablet     120 tablet    Take 1 tablet (325 mg) by mouth daily    Acute midline low back pain, with sciatica presence unspecified       atorvastatin 40 MG tablet    LIPITOR    30 tablet    Take 1 tablet (40 mg) by mouth daily    Claudication of left lower extremity (H)       benzonatate 100 MG capsule    TESSALON    42 capsule    Take 1 capsule (100 mg) by mouth 3 times daily as needed for cough    Acute on chronic systolic congestive heart failure (H)       CELEXA PO      Take 10 mg by mouth daily        cholecalciferol 1000 UNITS Tabs     30 tablet    Take 1,000 Units by mouth daily    Loop diuretic causing adverse effect in therapeutic use, subsequent encounter       clopidogrel 75 MG tablet    PLAVIX    30 tablet    Take 1 tablet (75 mg) by mouth daily    Atrial flutter, unspecified type (H)       * LASIX 20 MG tablet   Generic drug:  furosemide      Take 20 mg by mouth as needed (Give 20mg by mouth as needed for CHF QD PM PRN if wt increases 3 to 4 lbs)        * LASIX 40 MG tablet   Generic drug:  furosemide     30 tablet    Take 1 tablet (40 mg) by mouth daily If weight goes up 4-5 lbs, take an additional 1/2 tablet (20 mg) at 2 pm        lisinopril 10 MG tablet     PRINIVIL/ZESTRIL    30 tablet    Take 1 tablet (10 mg) by mouth daily    Essential hypertension, Chronic diastolic heart failure (H)       metFORMIN 500 MG tablet    GLUCOPHAGE    60 tablet    Take 1 tablet (500 mg) by mouth 2 times daily (with meals)    Type 2 diabetes mellitus with diabetic polyneuropathy, without long-term current use of insulin (H)       metoprolol 200 MG 24 hr tablet    TOPROL-XL    90 tablet    Take 1 tablet (200 mg) by mouth daily    Chronic systolic heart failure (H)       nitroGLYcerin 0.4 MG sublingual tablet    NITROSTAT    25 tablet    For chest pain place 1 tablet under the tongue every 5 minutes for 3 doses. If symptoms persist 5 minutes after 1st dose call 911.    Atrial flutter, unspecified type (H)       nystatin cream    MYCOSTATIN     Apply topically 2 times daily        order for DME     1 Device    Equipment being ordered: 2 shoe    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), Diabetic ulcer of left foot due to type 2 diabetes mellitus (H)       polyethylene glycol Packet    MIRALAX/GLYCOLAX    7 packet    Take 17 g by mouth daily as needed for constipation    Constipation, unspecified constipation type       ranitidine 150 MG tablet    ZANTAC    60 tablet    Take 1 tablet (150 mg) by mouth 2 times daily    Gastroesophageal reflux disease without esophagitis       senna-docusate 8.6-50 MG per tablet    SENOKOT-S;PERICOLACE    100 tablet    Take 1-2 tablets by mouth 2 times daily as needed (constipation )    Constipation, unspecified constipation type       spironolactone 25 MG tablet    ALDACTONE    45 tablet    Take 0.5 tablets (12.5 mg) by mouth daily    Chronic systolic congestive heart failure (H)       tamsulosin 0.4 MG capsule    FLOMAX    60 capsule    Take 2 capsules (0.8 mg) by mouth At Bedtime    Renal insufficiency       * Notice:  This list has 2 medication(s) that are the same as other medications prescribed for you. Read the directions carefully, and ask  your doctor or other care provider to review them with you.

## 2017-08-14 NOTE — LETTER
8/14/2017      RE: Tad Manning  [address]   [facility] [address]       Dear Colleague,    Thank you for the opportunity to participate in the care of your patient, Tad Manning, at the [facility]. Please see a copy of my visit note below.    HPI  Mr. Manning is a 76 year old man with a history of CAD, ICM, HTN, HLD, atrial flutter, s/p popliteal bypass and paraspinal abscess s/p surgical decompression with IV antibiotics. He was last seen in CORE clinic 5 weeks ago when his Lasix was reduced. He returns for follow up.    Mr. Manning denies shortness of breath but feels more limited by leg weakness. No sob with ADLs. NO chest pain, palpitations, lightheadedness. No recent falls or syncope. No edema or bloating. Appetite is good. No nausea or early satiety. 1 pillow and no PND. Does not snore. Released from Rehab this past week. Continues OT/PT and notes improving endurance and strength.    PMH  Past Medical History:   Diagnosis Date     Arthritis      ASCVD (arteriosclerotic cardiovascular disease)      BMI 30.0-30.9,adult      BPH (benign prostatic hypertrophy)      Cellulitis      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)      Coronary artery disease     triple bypass 1995     Diabetes mellitus (H)      Diabetic polyneuropathy (H)      History of blood transfusion      Hyperlipidaemia      Hypertension      Malignant neoplasm (H)     CLL     Noninflammatory pericardial effusion      Osteomyelitis of left foot (H)      PVD (peripheral vascular disease) (H)      Sebaceous cyst      Social History     Social History     Marital status: Single     Spouse name: N/A     Number of children: N/A     Years of education: N/A     Occupational History     Not on file.     Social History Main Topics     Smoking status: Former Smoker     Packs/day: 2.00     Years: 30.00     Types: Cigarettes      Quit date: 6/19/1995     Smokeless tobacco: Never Used     Alcohol use Yes      Comment: 1 drink monthly     Drug use: No     Sexual activity: Not on file     Other Topics Concern     Not on file     Social History Narrative    Lives independently with SO. 2/9/2017      Family History   Problem Relation Age of Onset     CANCER Mother      leukemia     MEDS   Current Outpatient Prescriptions on File Prior to Visit:  lisinopril (PRINIVIL/ZESTRIL) 10 MG tablet Take 1 tablet (10 mg) by mouth daily   furosemide (LASIX) 40 MG tablet Take 1 tablet (40 mg) by mouth daily If weight goes up 4-5 lbs, take an additional 1/2 tablet (20 mg) at 2 pm   Citalopram Hydrobromide (CELEXA PO) Take 10 mg by mouth daily   spironolactone (ALDACTONE) 25 MG tablet Take 0.5 tablets (12.5 mg) by mouth daily   nystatin (MYCOSTATIN) cream Apply topically 2 times daily   aspirin 325 MG tablet Take 1 tablet (325 mg) by mouth daily   atorvastatin (LIPITOR) 40 MG tablet Take 1 tablet (40 mg) by mouth daily   clopidogrel (PLAVIX) 75 MG tablet Take 1 tablet (75 mg) by mouth daily   metFORMIN (GLUCOPHAGE) 500 MG tablet Take 1 tablet (500 mg) by mouth 2 times daily (with meals)   metoprolol (LOPRESSOR) 25 MG tablet Take 3 tablets (75 mg) by mouth 2 times daily   ranitidine (ZANTAC) 150 MG tablet Take 1 tablet (150 mg) by mouth 2 times daily   cholecalciferol 1000 UNITS TABS Take 1,000 Units by mouth daily   tamsulosin (FLOMAX) 0.4 MG capsule Take 2 capsules (0.8 mg) by mouth At Bedtime   nitroglycerin (NITROSTAT) 0.4 MG sublingual tablet For chest pain place 1 tablet under the tongue every 5 minutes for 3 doses. If symptoms persist 5 minutes after 1st dose call 911.   AMOXICILLIN PO Take 500 mg by mouth 3 times daily   polyethylene glycol (MIRALAX/GLYCOLAX) Packet Take 17 g by mouth daily as needed for constipation (Patient not taking: Reported on 8/14/2017)   senna-docusate (SENOKOT-S;PERICOLACE) 8.6-50 MG per tablet Take 1-2 tablets by mouth 2 times  daily as needed (constipation ) (Patient not taking: Reported on 8/14/2017)   benzonatate (TESSALON) 100 MG capsule Take 1 capsule (100 mg) by mouth 3 times daily as needed for cough (Patient not taking: Reported on 8/14/2017)   order for DME Equipment being ordered: DH2 shoe (Patient not taking: Reported on 8/14/2017)     No current facility-administered medications on file prior to visit.     Physical examination:  /53 (BP Location: Left arm, Patient Position: Chair, Cuff Size: Adult Regular)  Pulse 82  Ht 1.829 m (6')  Wt 83.1 kg (183 lb 4.8 oz)  SpO2 97%  BMI 24.86 kg/m2  GENERAL APPEARANCE: healthy, alert and no distress  HEENT: no icterus, no xanthelasmas, normal pupil size and reaction, normal palate, mucosa moist, no cyanosis.  NECK: no adenopathy, no asymmetry, masses, or scars  CHEST: lungs clear to auscultation - no rales, rhonchi or wheezes, no use of accessory muscles, no retractions, respirations are unlabored, normal respiratory rate, no kyphosis, no scoliosis  CARDIOVASCULAR: regular rhythm, normal S1 with physiologic split S2, no S3 or S4 and no murmur, click or rub, precordium quiet with normal PMI. No JVD  ABDOMEN: soft, non tender, without hepatomegaly, no masses palpable, bowel sounds normal  EXTREMITIES: no clubbing, cyanosis or edema  NEURO: alert and oriented to person/place/time, normal speech, gait and affect  SKIN: no ecchymoses, no rashes    LABS  Last Basic Metabolic Panel:  Lab Results   Component Value Date     08/14/2017      Lab Results   Component Value Date    POTASSIUM 4.3 08/14/2017     Lab Results   Component Value Date    CHLORIDE 103 08/14/2017     Lab Results   Component Value Date    JUSTINO 9.3 08/14/2017     Lab Results   Component Value Date    CO2 24 08/14/2017     Lab Results   Component Value Date    BUN 26 08/14/2017     Lab Results   Component Value Date    CR 1.03 08/14/2017     Lab Results   Component Value Date     08/14/2017          ECHO  Recent Results (from the past 4320 hour(s))   Echo limited with definity    Narrative    [id number]  [facility]  [id number]  [id number]^LUIGI^YANELY^           [facility]  Echocardiography Laboratory  [address] [place] [id number]     Name: PETER BUTLER  MRN: [id number]  : 1940  Study Date: 2017 12:24 PM  Age: 76 yrs  Gender: Male  Patient Location: [facility]  Reason For Study: Arrhythmia  Ordering Physician: YANELY MEYERS  Performed By: DO Austin     BSA: 2.1 m2  Height: 72 in  Weight: 190 lb  BP: 128/64 mmHg  _____________________________________________________________________________  __        Procedure  Complete Portable Echo Adult. Contrast Optison. Technically difficult study.  Optison (NDC #3332-3353-71) given intravenously. Patient was given 6 ml  mixture of 3 ml Optison and 6 ml saline. 3 ml wasted. IV start location R  Upper arm . The patient's rhythm is atrial fibrillation.  _____________________________________________________________________________  __        Interpretation Summary  Technically difficult study.The patient's rhythm is atrial fibrillation.  The left ventricle is severely dilated. Left ventricular systolic function is  severely reduced, ejection fraction is estimated at 25-30%. There is severe  global hypokinesis.  Right ventricular systolic function is mildly reduced.  The right ventricular systolic pressure is approximated at 15 mmHg plus the  right atrial pressure. IVC is plethoric and estimated mean RA pressure is 15  mmHg.  No pericardial effusion present.  _____________________________________________________________________________  __     _____________________________________________________________________________  __     MMode/2D Measurements & Calculations     EF(MOD-bp): 30.0 %        Doppler Measurements & Calculations  TR max alessia: 190.0 cm/sec  TR max P.4 mmHg            _____________________________________________________________________________  __           Report approved by: Edward Snyder 02/24/2017 05:09 PM        Assessment and Plan  Mr. Manning is a pleasant 76 year old man with a history of CAD, ICM, HTN, HLD, atrial flutter, s/p popliteal bypass who appears well. We'll transition him from lopressor to Toprol for systolic dysfunction. He is getting stronger and feeling well. He'd prefer to defer any re-evaluation of his coronary arteries until he feels much stronger. He will return to clinic in 1 month. We'll repeat a BMP in 2 weeks given slight bump in creatinine today.    20 minutes spent in direct care, >50% in counseling      Please do not hesitate to contact me if you have any questions/concerns.     Sincerely,     Jess Chi NP

## 2017-08-14 NOTE — PROGRESS NOTES
HPI  Mr. Manning is a 76 year old man with a history of CAD, ICM, HTN, HLD, atrial flutter, s/p popliteal bypass and paraspinal abscess s/p surgical decompression with IV antibiotics. He was last seen in [facility] clinic 5 weeks ago when his Lasix was reduced. He returns for follow up.    Mr. Manning denies shortness of breath but feels more limited by leg weakness. No sob with ADLs. NO chest pain, palpitations, lightheadedness. No recent falls or syncope. No edema or bloating. Appetite is good. No nausea or early satiety. 1 pillow and no PND. Does not snore. Released from Rehab this past week. Continues OT/PT and notes improving endurance and strength.    PMH  Past Medical History:   Diagnosis Date     Arthritis      ASCVD (arteriosclerotic cardiovascular disease)      BMI 30.0-30.9,adult      BPH (benign prostatic hypertrophy)      Cellulitis      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)      Coronary artery disease     triple bypass 1995     Diabetes mellitus (H)      Diabetic polyneuropathy (H)      History of blood transfusion      Hyperlipidaemia      Hypertension      Malignant neoplasm (H)     CLL     Noninflammatory pericardial effusion      Osteomyelitis of left foot (H)      PVD (peripheral vascular disease) (H)      Sebaceous cyst      Social History     Social History     Marital status: Single     Spouse name: N/A     Number of children: N/A     Years of education: N/A     Occupational History     Not on file.     Social History Main Topics     Smoking status: Former Smoker     Packs/day: 2.00     Years: 30.00     Types: Cigarettes     Quit date: 6/19/1995     Smokeless tobacco: Never Used     Alcohol use Yes      Comment: 1 drink monthly     Drug use: No     Sexual activity: Not on file     Other Topics Concern     Not on file     Social History Narrative    Lives independently with SO. 2/9/2017      Family History   Problem Relation Age of Onset     CANCER Mother       leukemia     MEDS   Current Outpatient Prescriptions on File Prior to Visit:  lisinopril (PRINIVIL/ZESTRIL) 10 MG tablet Take 1 tablet (10 mg) by mouth daily   furosemide (LASIX) 40 MG tablet Take 1 tablet (40 mg) by mouth daily If weight goes up 4-5 lbs, take an additional 1/2 tablet (20 mg) at 2 pm   Citalopram Hydrobromide (CELEXA PO) Take 10 mg by mouth daily   spironolactone (ALDACTONE) 25 MG tablet Take 0.5 tablets (12.5 mg) by mouth daily   nystatin (MYCOSTATIN) cream Apply topically 2 times daily   aspirin 325 MG tablet Take 1 tablet (325 mg) by mouth daily   atorvastatin (LIPITOR) 40 MG tablet Take 1 tablet (40 mg) by mouth daily   clopidogrel (PLAVIX) 75 MG tablet Take 1 tablet (75 mg) by mouth daily   metFORMIN (GLUCOPHAGE) 500 MG tablet Take 1 tablet (500 mg) by mouth 2 times daily (with meals)   metoprolol (LOPRESSOR) 25 MG tablet Take 3 tablets (75 mg) by mouth 2 times daily   ranitidine (ZANTAC) 150 MG tablet Take 1 tablet (150 mg) by mouth 2 times daily   cholecalciferol 1000 UNITS TABS Take 1,000 Units by mouth daily   tamsulosin (FLOMAX) 0.4 MG capsule Take 2 capsules (0.8 mg) by mouth At Bedtime   nitroglycerin (NITROSTAT) 0.4 MG sublingual tablet For chest pain place 1 tablet under the tongue every 5 minutes for 3 doses. If symptoms persist 5 minutes after 1st dose call 911.   AMOXICILLIN PO Take 500 mg by mouth 3 times daily   polyethylene glycol (MIRALAX/GLYCOLAX) Packet Take 17 g by mouth daily as needed for constipation (Patient not taking: Reported on 8/14/2017)   senna-docusate (SENOKOT-S;PERICOLACE) 8.6-50 MG per tablet Take 1-2 tablets by mouth 2 times daily as needed (constipation ) (Patient not taking: Reported on 8/14/2017)   benzonatate (TESSALON) 100 MG capsule Take 1 capsule (100 mg) by mouth 3 times daily as needed for cough (Patient not taking: Reported on 8/14/2017)   order for DME Equipment being ordered: DH2 shoe (Patient not taking: Reported on 8/14/2017)     No current  facility-administered medications on file prior to visit.     Physical examination:  /53 (BP Location: Left arm, Patient Position: Chair, Cuff Size: Adult Regular)  Pulse 82  Ht 1.829 m (6')  Wt 83.1 kg (183 lb 4.8 oz)  SpO2 97%  BMI 24.86 kg/m2  GENERAL APPEARANCE: healthy, alert and no distress  HEENT: no icterus, no xanthelasmas, normal pupil size and reaction, normal palate, mucosa moist, no cyanosis.  NECK: no adenopathy, no asymmetry, masses, or scars  CHEST: lungs clear to auscultation - no rales, rhonchi or wheezes, no use of accessory muscles, no retractions, respirations are unlabored, normal respiratory rate, no kyphosis, no scoliosis  CARDIOVASCULAR: regular rhythm, normal S1 with physiologic split S2, no S3 or S4 and no murmur, click or rub, precordium quiet with normal PMI. No JVD  ABDOMEN: soft, non tender, without hepatomegaly, no masses palpable, bowel sounds normal  EXTREMITIES: no clubbing, cyanosis or edema  NEURO: alert and oriented to person/place/time, normal speech, gait and affect  SKIN: no ecchymoses, no rashes    LABS  Last Basic Metabolic Panel:  Lab Results   Component Value Date     2017      Lab Results   Component Value Date    POTASSIUM 4.3 2017     Lab Results   Component Value Date    CHLORIDE 103 2017     Lab Results   Component Value Date    JUSTINO 9.3 2017     Lab Results   Component Value Date    CO2 24 2017     Lab Results   Component Value Date    BUN 26 2017     Lab Results   Component Value Date    CR 1.03 2017     Lab Results   Component Value Date     2017         ECHO  Recent Results (from the past 4320 hour(s))   Echo limited with definity    Narrative    [id number]  [id number]  [id number]  [id number]^LUIGI^YANELY^           [facility],[place]  Echocardiography Laboratory  [address] [place] [id number]     Name: PETER BUTLER  MRN: [id number]  : 1940  Study  Date: 2017 12:24 PM  Age: 76 yrs  Gender: Male  [facility]  Reason For Study: Arrhythmia  Ordering Physician: YANELY MEYERS  Performed By: KATI Austin     BSA: 2.1 m2  Height: 72 in  Weight: 190 lb  BP: 128/64 mmHg  _____________________________________________________________________________  __        Procedure  Complete Portable Echo Adult. Contrast Optison. Technically difficult study.  Optison (NDC #3160-8147-31) given intravenously. Patient was given 6 ml  mixture of 3 ml Optison and 6 ml saline. 3 ml wasted. IV start location R  Upper arm . The patient's rhythm is atrial fibrillation.  _____________________________________________________________________________  __        Interpretation Summary  Technically difficult study.The patient's rhythm is atrial fibrillation.  The left ventricle is severely dilated. Left ventricular systolic function is  severely reduced, ejection fraction is estimated at 25-30%. There is severe  global hypokinesis.  Right ventricular systolic function is mildly reduced.  The right ventricular systolic pressure is approximated at 15 mmHg plus the  right atrial pressure. IVC is plethoric and estimated mean RA pressure is 15  mmHg.  No pericardial effusion present.  _____________________________________________________________________________  __     _____________________________________________________________________________  __     MMode/2D Measurements & Calculations     EF(MOD-bp): 30.0 %        Doppler Measurements & Calculations  TR max alessia: 190.0 cm/sec  TR max P.4 mmHg           _____________________________________________________________________________  __           Report approved by: Edward Snyder 2017 05:09 PM        Assessment and Plan  Mr. Manning is a pleasant 76 year old man with a history of CAD, ICM, HTN, HLD, atrial flutter, s/p popliteal bypass who appears well. We'll transition him from lopressor to Toprol for  systolic dysfunction. He is getting stronger and feeling well. He'd prefer to defer any re-evaluation of his coronary arteries until he feels much stronger. He will return to clinic in 1 month. We'll repeat a BMP in 2 weeks given slight bump in creatinine today.    20 minutes spent in direct care, >50% in counseling

## 2017-08-14 NOTE — PATIENT INSTRUCTIONS
"You were seen today in the Cardiovascular Clinic at the [facility].     Cardiology Providers you saw during your visit: Jess Chi      1. Please stop taking Lopressor (metoprolol tartrate)    2. Begin taking Metoprolol SUCCINATE 200 mg once daily    3.  Please return to [facility] on  at 10 to see Jess. Call if you have any concerns in the meantime.      Please limit your fluid intake to 2 L (64 ounces) daily.  2 Liters a day = 8.5 cups, or 72 ounces.  Please limit your salt intake to 2 grams a day or less.    If you gain 2# in 24 hours or 5# in one week call Nicole Hollingsworth RN so we can adjust your medications as needed over the phone.    Please feel free to call me with any questions or concerns.      Nicole Hollingsworth RN BSN CHFN  [facility]  Cardiology Care Coordinator-Heart Failure Clinic    Questions and [phone].   First press #1 for the [employer] and then press #3 for \"Medical Questions\" to reach us Cardiology Nurses.     On Call Cardiologist for after hours or on weekends: [phone]   option #4 and ask to speak to the on-call Cardiologist. Inform them you are a C[facility]/heart failure patient at the [facility].        If you need a medication refill please contact your pharmacy.  Please allow 3 business days for your refill to be completed.  _______________________________________________________  [facility]IN[facility] Cardiomyopathy, Optimization, Rehabilitation, Education   The [facility].R.E[facility] CLINIC is a heart failure specialty clinic within the [facility] where you will work with specialized nurse practitioners dedicated to helping patients with heart failure carefully adjust medications, receive education, and learn who and when to call if symptoms develop. They specialize in helping you better understand your condition, slow the progression of your disease, improve the length and quality of your life, help you detect " future heart problems before they become life threatening, and avoid hospitalizations.  As always, thank you for trusting us with your health care needs!

## 2017-08-14 NOTE — NURSING NOTE
Chief Complaint   Patient presents with     Follow Up For     Return C[facility]; 76yr old male wih a h/o CAD, Aflutter and chronic systolic HF secondary to ICM presenting for HF follow-up with labs prior     Vitals were taken and medications were reconciled.  MOE Salazar  10:48 AM

## 2017-08-22 ENCOUNTER — TRANSFERRED RECORDS (OUTPATIENT)
Dept: HEALTH INFORMATION MANAGEMENT | Facility: CLINIC | Age: 77
End: 2017-08-22

## 2017-08-22 LAB
ANION GAP SERPL CALCULATED.3IONS-SCNC: ABNORMAL MMOL/L
BUN SERPL-MCNC: 31 MG/DL
CALCIUM SERPL-MCNC: 9.2 MG/DL
CHLORIDE SERPLBLD-SCNC: 106 MMOL/L
CO2 SERPL-SCNC: 22 MMOL/L
CREAT SERPL-MCNC: 1.15 MG/DL
GFR SERPL CREATININE-BSD FRML MDRD: ABNORMAL ML/MIN/1.73M2
GLUCOSE SERPL-MCNC: 116 MG/DL (ref 70–99)
POTASSIUM SERPL-SCNC: 4.3 MMOL/L
SODIUM SERPL-SCNC: 141 MMOL/L

## 2017-08-25 ENCOUNTER — DOCUMENTATION ONLY (OUTPATIENT)
Dept: CARDIOLOGY | Facility: CLINIC | Age: 77
End: 2017-08-25

## 2017-08-28 ENCOUNTER — CARE COORDINATION (OUTPATIENT)
Dept: CARDIOLOGY | Facility: CLINIC | Age: 77
End: 2017-08-28

## 2017-08-28 DIAGNOSIS — I50.32 CHRONIC DIASTOLIC HEART FAILURE (H): ICD-10-CM

## 2017-08-28 DIAGNOSIS — I10 ESSENTIAL HYPERTENSION: ICD-10-CM

## 2017-08-28 RX ORDER — LISINOPRIL 5 MG/1
5 TABLET ORAL DAILY
Qty: 90 TABLET | Refills: 3 | Status: SHIPPED | OUTPATIENT
Start: 2017-08-28 | End: 2018-02-09

## 2017-08-28 NOTE — PROGRESS NOTES
Results reviewed by Jess Chi NP then discussed with Tad via phone.  Tad verbalizes understanding and agrees with plan of care. Nicole Hollingsworth RN      Date: 8/28/2017  Time of Call: 9:22 AM  Diagnosis:  Heart failure  [facility] - Ordering provider: Jess Chi NP   Order: Decrease lisinopril to 5mg daily.  Repeat BMP at next clinic visit 9/18  Order received by: Nicole Hollingsworth RN   Follow-up/additional notes:

## 2017-09-08 ENCOUNTER — TELEPHONE (OUTPATIENT)
Dept: CARDIOLOGY | Facility: CLINIC | Age: 77
End: 2017-09-08

## 2017-09-08 NOTE — TELEPHONE ENCOUNTER
I called Crystal back. Since no other symptoms present (SOB, ALEXIS, edema) agreed to watch weights over the weekend.  Will call back if weight continues to climb.  Weights ranges from 176-181lbs.  Nicole Hollingsworth RN

## 2017-09-08 NOTE — TELEPHONE ENCOUNTER
Xiomara from [facility] calling reporting patient has had some weight gain.  His blood pressure is 98/42 so she is wondering if she should increase his lasix.  He has had no swelling, no cough, clear lungs.

## 2017-09-17 ENCOUNTER — PRE VISIT (OUTPATIENT)
Dept: CARDIOLOGY | Facility: CLINIC | Age: 77
End: 2017-09-17

## 2017-09-17 DIAGNOSIS — I50.22 CHRONIC SYSTOLIC HEART FAILURE (H): Primary | ICD-10-CM

## 2017-09-18 ENCOUNTER — OFFICE VISIT (OUTPATIENT)
Dept: NEUROSURGERY | Facility: CLINIC | Age: 77
End: 2017-09-18

## 2017-09-18 ENCOUNTER — OFFICE VISIT (OUTPATIENT)
Dept: CARDIOLOGY | Facility: CLINIC | Age: 77
End: 2017-09-18
Attending: NURSE PRACTITIONER
Payer: MEDICARE

## 2017-09-18 VITALS — DIASTOLIC BLOOD PRESSURE: 55 MMHG | SYSTOLIC BLOOD PRESSURE: 98 MMHG | HEART RATE: 73 BPM | HEIGHT: 72 IN

## 2017-09-18 VITALS
HEART RATE: 71 BPM | OXYGEN SATURATION: 98 % | DIASTOLIC BLOOD PRESSURE: 57 MMHG | BODY MASS INDEX: 24.52 KG/M2 | HEIGHT: 72 IN | SYSTOLIC BLOOD PRESSURE: 93 MMHG | WEIGHT: 181 LBS

## 2017-09-18 DIAGNOSIS — G06.2 EPIDURAL ABSCESS: Primary | ICD-10-CM

## 2017-09-18 DIAGNOSIS — I50.22 CHRONIC SYSTOLIC HEART FAILURE (H): ICD-10-CM

## 2017-09-18 DIAGNOSIS — I50.22 CHRONIC SYSTOLIC HEART FAILURE (H): Primary | ICD-10-CM

## 2017-09-18 LAB
ANION GAP SERPL CALCULATED.3IONS-SCNC: 7 MMOL/L (ref 3–14)
BUN SERPL-MCNC: 27 MG/DL (ref 7–30)
CALCIUM SERPL-MCNC: 9.5 MG/DL (ref 8.5–10.1)
CHLORIDE SERPL-SCNC: 102 MMOL/L (ref 94–109)
CO2 SERPL-SCNC: 27 MMOL/L (ref 20–32)
CREAT SERPL-MCNC: 1.21 MG/DL (ref 0.66–1.25)
GFR SERPL CREATININE-BSD FRML MDRD: 58 ML/MIN/1.7M2
GLUCOSE SERPL-MCNC: 161 MG/DL (ref 70–99)
POTASSIUM SERPL-SCNC: 4.6 MMOL/L (ref 3.4–5.3)
SODIUM SERPL-SCNC: 136 MMOL/L (ref 133–144)

## 2017-09-18 PROCEDURE — 80048 BASIC METABOLIC PNL TOTAL CA: CPT | Performed by: NURSE PRACTITIONER

## 2017-09-18 PROCEDURE — 36415 COLL VENOUS BLD VENIPUNCTURE: CPT | Performed by: NURSE PRACTITIONER

## 2017-09-18 PROCEDURE — 99213 OFFICE O/P EST LOW 20 MIN: CPT | Mod: ZP | Performed by: NURSE PRACTITIONER

## 2017-09-18 PROCEDURE — 99212 OFFICE O/P EST SF 10 MIN: CPT | Mod: ZF

## 2017-09-18 RX ORDER — ALPRAZOLAM 0.25 MG
TABLET ORAL
Refills: 1 | Status: ON HOLD | COMMUNITY
Start: 2017-08-29 | End: 2018-09-24

## 2017-09-18 RX ORDER — LISINOPRIL 10 MG/1
TABLET ORAL
Refills: 1 | COMMUNITY
Start: 2017-08-24 | End: 2018-02-09

## 2017-09-18 RX ORDER — FUROSEMIDE 20 MG
20 TABLET ORAL DAILY
Qty: 90 TABLET | Refills: 3 | Status: SHIPPED | OUTPATIENT
Start: 2017-09-18 | End: 2018-02-05

## 2017-09-18 ASSESSMENT — PAIN SCALES - GENERAL
PAINLEVEL: NO PAIN (0)
PAINLEVEL: NO PAIN (0)

## 2017-09-18 NOTE — MR AVS SNAPSHOT
"              After Visit Summary   2017    Tad Manning    MRN: [id number]           Patient Information     Date Of Birth          1940        Visit Information        Provider Department      2017 10:00 AM Jess Chi NP [facility]        Today's Diagnoses     Chronic systolic heart failure (H)    -  1      Care Instructions    You were seen today in the Cardiovascular Clinic at the [facility].     Cardiology Providers you saw during your visit: Jess Chi NP     1. Please reduce Lasix to 20 mg daily.    2. If you notice more swelling, shortness of breath, or any concerns, don't hesitate to call    3. Please return to clinic in ~6 weeks          Please limit your fluid intake to 2 L (64 ounces) daily.  2 Liters a day = 8.5 cups, or 72 ounces.  Please limit your salt intake to 2 grams a day or less.     If you gain 2# in 24 hours or 5# in one week call Nicole Hollingsworth RN so we can adjust your medications as needed over the phone.     Please feel free to call me with any questions or concerns.       Nicole Hollingsworth RN BSN CHFN  [facility]  Cardiology Care Coordinator-Heart Failure Clinic     Questions and [phone].   First press #1 for the University and then press #3 for \"Medical Questions\" to reach us Cardiology Nurses.      On Call Cardiologist for after hours or on weekends: [phone]   option #4 and ask to speak to the on-call Cardiologist. Inform them you are a CORE/heart failure patient at the [facility].           If you need a medication refill please contact your pharmacy.  Please allow 3 business days for your refill to be completed.  _______________________________________________________  C.O.R.E. CLINIC Cardiomyopathy, Optimization, Rehabilitation, Education   The C.O.R.E. CLINIC is a heart failure specialty clinic within the [facility] where you will work with " specialized nurse practitioners dedicated to helping patients with heart failure carefully adjust medications, receive education, and learn who and when to call if symptoms develop. They specialize in helping you better understand your condition, slow the progression of your disease, improve the length and quality of your life, help you detect future heart problems before they become life threatening, and avoid hospitalizations.  As always, thank you for trusting us with your health care needs!           Follow-ups after your visit        Your next 10 appointments already scheduled     Oct 30, 2017  8:30 AM CDT   Lab with  LAB   [facility])    [address] [place] [id number] [phone]            Oct 30, 2017  9:00 AM CDT   (Arrive by 8:45 AM)   Return Visit with Clarisse Valdes MD   [facility] ([facility])    [address] [place] [id number] [phone]            Apr 16, 2018 10:00 AM CDT   (Arrive by 9:45 AM)   CT THORACIC SPINE W/O CONTRAST with UCCT1   [facility] CT ([facility])    [address] [place] [id number] [phone]           Please bring any scans or X-rays taken at other hospitals, if similar tests were done. Also bring a list of your medicines, including vitamins, minerals and over-the-counter drugs. It is safest to leave personal items at home.  Be sure to tell your doctor:   If you have any allergies.   If there s any chance you are pregnant.   If you are breastfeeding.   If you have any special needs.  You do not need to do anything special to prepare.  Please wear loose clothing, such as a sweat suit or jogging clothes. Avoid snaps, zippers and other metal. We may ask you to undress and put on a hospital gown.            Apr 16, 2018 10:30 AM CDT   XR THORACIC/LUMBAR STANDING 2 VIEWS (SCOLI) with  UCXR1   [facility] ([facility])    [address]  1st Floor  [facility] [id number] [phone]           Please bring a list of your current medicines to your exam. (Include vitamins, minerals and over-thecounter medicines.) Leave your valuables at home.  Tell your doctor if there is a chance you may be pregnant.  You do not need to do anything special for this exam.            Apr 16, 2018 11:15 AM CDT   (Arrive by 11:00 AM)   Return Visit with Marcelo Trent MD   [facility] Neurosurgery ([facility])    [address]  3rd Floor  [facility] [id number] [phone]              Future tests that were ordered for you today     Open Future Orders        Priority Expected Expires Ordered    CT Thoracic spine w/o contrast Routine 3/18/2018 9/18/2018 9/18/2017    XR Thor/Lumb Standing 2 Views (Scoli) Routine 3/18/2018 9/18/2018 9/18/2017    Basic metabolic panel Routine 9/18/2017 9/25/2017 9/17/2017            Who to contact     If you have questions or need follow up information about today's clinic visit or your schedule please contact [facility] directly at [phone].  Normal or non-critical lab and imaging results will be communicated to you by [employer], letter or phone within 4 business days after the clinic has received the results. If you do not hear from us within 7 days, please contact the clinic through [employer] or phone. If you have a critical or abnormal lab result, we will notify you by phone as soon as possible.  Submit refill requests through [employer] or call your pharmacy and they will forward the refill request to us. Please allow 3 business days for your refill to be completed.          Additional Information About Your Visit        [employer] Information     [employer] gives you secure access to your electronic health record. If you see a primary care provider, you can also send messages to your care  team and make appointments. If you have questions, please call your primary care clinic.  If you do not have a primary care provider, please call [phone] and they will assist you.        Care EveryWhere ID     This is your Care EveryWhere ID. This could be used by other organizations to access your [facility] medical records  [id number]        Your Vitals Were     Pulse Height Pulse Oximetry BMI (Body Mass Index)          71 1.829 m (6') 98% 24.55 kg/m2         Blood Pressure from Last 3 Encounters:   09/18/17 93/57   09/18/17 98/55   08/14/17 109/53    Weight from Last 3 Encounters:   09/18/17 82.1 kg (181 lb)   08/14/17 83.1 kg (183 lb 4.8 oz)   07/06/17 80.6 kg (177 lb 11.2 oz)              Today, you had the following     No orders found for display         Today's Medication Changes          These changes are accurate as of: 9/18/17 10:41 AM.  If you have any questions, ask your nurse or doctor.               These medicines have changed or have updated prescriptions.        Dose/Directions    aspirin 325 MG tablet   This may have changed:  how much to take   Used for:  Acute midline low back pain, with sciatica presence unspecified        Dose:  325 mg   Take 1 tablet (325 mg) by mouth daily   Quantity:  120 tablet   Refills:  0       furosemide 20 MG tablet   Commonly known as:  LASIX   This may have changed:    - medication strength  - how much to take  - additional instructions   Used for:  Chronic systolic heart failure (H)   Changed by:  Jess Chi, ZACH        Dose:  20 mg   Take 1 tablet (20 mg) by mouth daily   Quantity:  90 tablet   Refills:  3            Where to get your medicines      These medications were sent to [facility] [address] [place], [place] - [address] AT [facility] [address]  [address], [facility] [address]     Phone:  [phone]     furosemide 20 MG tablet                Primary Care Provider Office Phone # Fax #    Gene Guevara MD  [phone]       [facility] [address] [place] [address]        Equal Access to Services     STUART ELLISON : Hadii aad ku hadevanghada Marilingin, juan joséda jerichomiguelha, juanta karosalva fonseca, deandre bazzi birdcarlos shepherd laAdriencésar del cid. So [facility] [phone].    ATENCIÓN: Si habla español, tiene a noe disposición servicios gratuitos de asistencia lingüística. Llame al [phone].    We comply with applicable federal civil rights laws and [place] laws. We do not discriminate on the basis of race, color, national origin, age, disability sex, sexual orientation or gender identity.            Thank you!     Thank you for choosing [facility]  for your care. Our goal is always to provide you with excellent care. Hearing back from our patients is one way we can continue to improve our services. Please take a few minutes to complete the written survey that you may receive in the mail after your visit with us. Thank you!             Your Updated Medication List - Protect others around you: Learn how to safely use, store and throw away your medicines at www.disposemymeds.org.          This list is accurate as of: 9/18/17 10:41 AM.  Always use your most recent med list.                   Brand Name Dispense Instructions for use Diagnosis    acetaminophen 325 MG tablet    TYLENOL     Take 325 mg by mouth every 4 hours as needed for mild pain        ALPRAZolam 0.25 MG tablet    XANAX     TK 1 T PO QHS PRN    Epidural abscess       aspirin 325 MG tablet     120 tablet    Take 1 tablet (325 mg) by mouth daily    Acute midline low back pain, with sciatica presence unspecified       atorvastatin 40 MG tablet    LIPITOR    30 tablet    Take 1 tablet (40 mg) by mouth daily    Claudication of left lower extremity (H)       benzonatate 100 MG capsule    TESSALON    42 capsule    Take 1 capsule (100 mg) by mouth 3 times daily as needed for cough    Acute on chronic systolic congestive heart  failure (H)       CELEXA PO      Take 10 mg by mouth daily        cholecalciferol 1000 UNITS Tabs     30 tablet    Take 1,000 Units by mouth daily    Loop diuretic causing adverse effect in therapeutic use, subsequent encounter       clopidogrel 75 MG tablet    PLAVIX    30 tablet    Take 1 tablet (75 mg) by mouth daily    Atrial flutter, unspecified type (H)       furosemide 20 MG tablet    LASIX    90 tablet    Take 1 tablet (20 mg) by mouth daily    Chronic systolic heart failure (H)       * lisinopril 10 MG tablet    PRINIVIL/ZESTRIL     TK 1 T PO D    Epidural abscess       * lisinopril 5 MG tablet    PRINIVIL/ZESTRIL    90 tablet    Take 1 tablet (5 mg) by mouth daily    Essential hypertension, Chronic diastolic heart failure (H)       metFORMIN 500 MG tablet    GLUCOPHAGE    60 tablet    Take 1 tablet (500 mg) by mouth 2 times daily (with meals)    Type 2 diabetes mellitus with diabetic polyneuropathy, without long-term current use of insulin (H)       metoprolol 200 MG 24 hr tablet    TOPROL-XL    90 tablet    Take 1 tablet (200 mg) by mouth daily    Chronic systolic heart failure (H)       nitroGLYcerin 0.4 MG sublingual tablet    NITROSTAT    25 tablet    For chest pain place 1 tablet under the tongue every 5 minutes for 3 doses. If symptoms persist 5 minutes after 1st dose call 911.    Atrial flutter, unspecified type (H)       nystatin cream    MYCOSTATIN     Apply topically 2 times daily        order for DME     1 Device    Equipment being ordered: DH2 shoe    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), Diabetic ulcer of left foot due to type 2 diabetes mellitus (H)       polyethylene glycol Packet    MIRALAX/GLYCOLAX    7 packet    Take 17 g by mouth daily as needed for constipation    Constipation, unspecified constipation type       ranitidine 150 MG tablet    ZANTAC    60 tablet    Take 1 tablet (150 mg) by mouth 2 times daily    Gastroesophageal reflux disease without esophagitis        senna-docusate 8.6-50 MG per tablet    SENOKOT-S;PERICOLACE    100 tablet    Take 1-2 tablets by mouth 2 times daily as needed (constipation )    Constipation, unspecified constipation type       spironolactone 25 MG tablet    ALDACTONE    45 tablet    Take 0.5 tablets (12.5 mg) by mouth daily    Chronic systolic congestive heart failure (H)       tamsulosin 0.4 MG capsule    FLOMAX    60 capsule    Take 2 capsules (0.8 mg) by mouth At Bedtime    Renal insufficiency       * Notice:  This list has 2 medication(s) that are the same as other medications prescribed for you. Read the directions carefully, and ask your doctor or other care provider to review them with you.

## 2017-09-18 NOTE — LETTER
9/18/2017      RE: Tad Manning  [address]   [facility] [address]       Dear Colleague,    Thank you for the opportunity to participate in the care of your patient, Tad Manning, at the [facility]. Please see a copy of my visit note below.    HPI  Mr. Manning is a 76 year old man with a history of CAD, ICM, HTN, HLD, atrial flutter, s/p popliteal bypass and paraspinal abscess s/p surgical decompression with IV antibiotics. He was last seen in CORE clinic a month ago when we switched him from lopressor to Toprol. He returns for follow up.    Tad has been feeling well. He has had a fall 2 weeks ago after standing and felt lightheaded. No head injury. No shortness of breath, chest pain, palpitations, ankle edema. He continues to sleep with the head of bed elevated but denies PND. Appetite is good and continues to improve.     PMH  Past Medical History:   Diagnosis Date     Arthritis      ASCVD (arteriosclerotic cardiovascular disease)      BMI 30.0-30.9,adult      BPH (benign prostatic hypertrophy)      Cellulitis      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)      Coronary artery disease     triple bypass 1995     Diabetes mellitus (H)      Diabetic polyneuropathy (H)      History of blood transfusion      Hyperlipidaemia      Hypertension      Malignant neoplasm (H)     CLL     Noninflammatory pericardial effusion      Osteomyelitis of left foot (H)      PVD (peripheral vascular disease) (H)      Sebaceous cyst      Social History     Social History     Marital status: Single     Spouse name: N/A     Number of children: N/A     Years of education: N/A     Occupational History     Not on file.     Social History Main Topics     Smoking status: Former Smoker     Packs/day: 2.00     Years: 30.00     Types: Cigarettes     Quit date: 6/19/1995     Smokeless tobacco: Never Used     Alcohol  use Yes      Comment: 1 drink monthly     Drug use: No     Sexual activity: Not on file     Other Topics Concern     Not on file     Social History Narrative    Lives independently with SO. 2/9/2017      Family History   Problem Relation Age of Onset     CANCER Mother      leukemia     MEDS   Current Outpatient Prescriptions on File Prior to Visit:  ALPRAZolam (XANAX) 0.25 MG tablet TK 1 T PO QHS PRN   lisinopril (PRINIVIL/ZESTRIL) 5 MG tablet Take 1 tablet (5 mg) by mouth daily   acetaminophen (TYLENOL) 325 MG tablet Take 325 mg by mouth every 4 hours as needed for mild pain   metoprolol (TOPROL-XL) 200 MG 24 hr tablet Take 1 tablet (200 mg) by mouth daily   furosemide (LASIX) 40 MG tablet Take 1 tablet (40 mg) by mouth daily If weight goes up 4-5 lbs, take an additional 1/2 tablet (20 mg) at 2 pm   Citalopram Hydrobromide (CELEXA PO) Take 10 mg by mouth daily   spironolactone (ALDACTONE) 25 MG tablet Take 0.5 tablets (12.5 mg) by mouth daily   nystatin (MYCOSTATIN) cream Apply topically 2 times daily   aspirin 325 MG tablet Take 1 tablet (325 mg) by mouth daily (Patient taking differently: Take 81 mg by mouth daily )   atorvastatin (LIPITOR) 40 MG tablet Take 1 tablet (40 mg) by mouth daily   clopidogrel (PLAVIX) 75 MG tablet Take 1 tablet (75 mg) by mouth daily   metFORMIN (GLUCOPHAGE) 500 MG tablet Take 1 tablet (500 mg) by mouth 2 times daily (with meals)   cholecalciferol 1000 UNITS TABS Take 1,000 Units by mouth daily   tamsulosin (FLOMAX) 0.4 MG capsule Take 2 capsules (0.8 mg) by mouth At Bedtime   nitroglycerin (NITROSTAT) 0.4 MG sublingual tablet For chest pain place 1 tablet under the tongue every 5 minutes for 3 doses. If symptoms persist 5 minutes after 1st dose call 911.   order for DME Equipment being ordered: 2 shoe   lisinopril (PRINIVIL/ZESTRIL) 10 MG tablet TK 1 T PO D   polyethylene glycol (MIRALAX/GLYCOLAX) Packet Take 17 g by mouth daily as needed for constipation (Patient not taking:  Reported on 9/18/2017)   ranitidine (ZANTAC) 150 MG tablet Take 1 tablet (150 mg) by mouth 2 times daily (Patient not taking: Reported on 9/18/2017)   senna-docusate (SENOKOT-S;PERICOLACE) 8.6-50 MG per tablet Take 1-2 tablets by mouth 2 times daily as needed (constipation ) (Patient not taking: Reported on 9/18/2017)   benzonatate (TESSALON) 100 MG capsule Take 1 capsule (100 mg) by mouth 3 times daily as needed for cough (Patient not taking: Reported on 9/18/2017)     No current facility-administered medications on file prior to visit.     Physical examination:  BP 93/57  Pulse 71  Ht 1.829 m (6')  Wt 82.1 kg (181 lb)  SpO2 98%  BMI 24.55 kg/m2  GENERAL APPEARANCE: healthy, alert and no distress  HEENT: no icterus, no xanthelasmas, normal pupil size and reaction, normal palate, mucosa moist, no cyanosis.  NECK: no adenopathy, no asymmetry, masses, or scars  CHEST: lungs clear to auscultation - no rales, rhonchi or wheezes, no use of accessory muscles, no retractions, respirations are unlabored, normal respiratory rate, no kyphosis, no scoliosis  CARDIOVASCULAR: regular rhythm, normal S1 with physiologic split S2, no S3 or S4 and no murmur, click or rub, precordium quiet with normal PMI. No JVD  ABDOMEN: soft, non tender, without hepatomegaly, no masses palpable, bowel sounds normal  EXTREMITIES: no clubbing, cyanosis or edema  NEURO: alert and oriented to person/place/time, normal speech, gait and affect  SKIN: no ecchymoses, no rashes    LABS  Last Basic Metabolic Panel:  Lab Results   Component Value Date     09/18/2017      Lab Results   Component Value Date    POTASSIUM 4.6 09/18/2017     Lab Results   Component Value Date    CHLORIDE 102 09/18/2017     Lab Results   Component Value Date    JUSTINO 9.5 09/18/2017     Lab Results   Component Value Date    CO2 27 09/18/2017     Lab Results   Component Value Date    BUN 27 09/18/2017     Lab Results   Component Value Date    CR 1.21 09/18/2017     Lab  Results   Component Value Date     2017         ECHO  Recent Results (from the past 4320 hour(s))   Echo limited with definity    Narrative    [id number]  [id number]  [id number]  [id number]^LUIGI^YANELY^           [facility]  Echocardiography Laboratory  [address] [place] [id number]     Name: PETER BUTLER  MRN: [id number]  : 1940  Study Date: 2017 12:24 PM  Age: 76 yrs  Gender: Male  Patient Location: [facility]  Reason For Study: Arrhythmia  Ordering Physician: YANELY MEYERS  Performed By: DO Austin     BSA: 2.1 m2  Height: 72 in  Weight: 190 lb  BP: 128/64 mmHg  _____________________________________________________________________________  __        Procedure  Complete Portable Echo Adult. Contrast Optison. Technically difficult study.  Optison (NDC #7347-6858-28) given intravenously. Patient was given 6 ml  mixture of 3 ml Optison and 6 ml saline. 3 ml wasted. IV start location R  Upper arm . The patient's rhythm is atrial fibrillation.  _____________________________________________________________________________  __        Interpretation Summary  Technically difficult study.The patient's rhythm is atrial fibrillation.  The left ventricle is severely dilated. Left ventricular systolic function is  severely reduced, ejection fraction is estimated at 25-30%. There is severe  global hypokinesis.  Right ventricular systolic function is mildly reduced.  The right ventricular systolic pressure is approximated at 15 mmHg plus the  right atrial pressure. IVC is plethoric and estimated mean RA pressure is 15  mmHg.  No pericardial effusion present.  _____________________________________________________________________________  __     _____________________________________________________________________________  __     MMode/2D Measurements & Calculations     EF(MOD-bp): 30.0 %        Doppler Measurements & Calculations  TR max alessia: 190.0  cm/sec  TR max P.4 mmHg           _____________________________________________________________________________  __           Report approved by: Edward Snyder 2017 05:09 PM        Assessment and Plan  Mr. Manning is a pleasant 76 year old man with a history of CAD, ICM, HTN, HLD, atrial flutter, s/p popliteal bypass who appears well though his renal function has gradually been trending up and his blood pressures have been low. I suspect he is mildly hypovolemic. He will reduce Lasix to 20 mg daily, though advised he may take an additional 20 mg prn. He again is not interested in pursuing more intervention though is feeling stronger. He will return to see Dr. Valdes in 6 weeks and CORE prn.    1. Chronic systolic heart failure secondary to ICM  NYHA II-IIIA (confounded my immobility)  Stage C  ACE/ARB -yes - lisinopril  BB - yes - Toprol  Ald Ant - yes - Spironolactone 12.5 mg  SCD prevention -- deferred given recent osteomyelitis and pending complete evaluation of CAD  Apnea--was not discussed today, risk factors include gender, age, and history of CAD  Volume    2. CAD. On a statin, beta blocker, ASA, and Plavix. He has deferred further intervention until feeling stronger and cleared of osteomyelitis    3. Atrial Flutter. He has not been anticoagulated due to fall risk. He is on ASA and Plavix plus beta blocker    20 minutes spent in direct care, >50% in counseling      Jess Chi NP

## 2017-09-18 NOTE — NURSING NOTE
Chief Complaint   Patient presents with     Follow Up For     Return CORE; 76yr old male wih a h/o CAD, Aflutter and chronic systolic HF secondary to ICM presenting for HF follow-up with labs prior     Vitals were taken and medications were reconciled.     Diogo Ennis MA  10:13 AM

## 2017-09-18 NOTE — NURSING NOTE
Chief Complaint   Patient presents with     RECHECK     3 month f/u with [facility], osteomyelitis of thoracic spine, 1. T7-T12 posterior segmental instrumentation.    Jose Luna, CMA

## 2017-09-18 NOTE — MR AVS SNAPSHOT
After Visit Summary   9/18/2017    Tad Manning    MRN: [id number]           Patient Information     Date Of Birth          1940        Visit Information        Provider Department      9/18/2017 8:30 AM Marcelo Trent MD [facility] Neurosurgery        Today's Diagnoses     Epidural abscess    -  1       Follow-ups after your visit        Follow-up notes from your care team     Return in about 6 months (around 3/18/2018) for Imaging.      Your next 10 appointments already scheduled     Sep 18, 2017 10:00 AM CDT   (Arrive by 9:45 AM)   CORE RETURN with Jess Chi NP   [facility] Heart Care ([facility])    [address] [place] [address]  3rd [facility] [address] [phone]            Apr 16, 2018 10:00 AM CDT   (Arrive by 9:45 AM)   CT THORACIC SPINE W/O CONTRAST with [facility] [place] [address] CT ([facility])    [address] [place] [address] [phone]           Please bring any scans or X-rays taken at other hospitals, if similar tests were done. Also bring a list of your medicines, including vitamins, minerals and over-the-counter drugs. It is safest to leave personal items at home.  Be sure to tell your doctor:   If you have any allergies.   If there s any chance you are pregnant.   If you are breastfeeding.   If you have any special needs.  You do not need to do anything special to prepare.  Please wear loose clothing, such as a sweat suit or jogging clothes. Avoid snaps, zippers and other metal. We may ask you to undress and put on a hospital gown.            Apr 16, 2018 10:30 AM CDT   XR THORACIC/LUMBAR STANDING 2 VIEWS (SCOLI) with [address] [place] [address] Xray ([facility])    [address] [place] [address] [phone]           Please bring a list of your current medicines to your exam. (Include vitamins, minerals  and over-thecounter medicines.) Leave your valuables at home.  Tell your doctor if there is a chance you may be pregnant.  You do not need to do anything special for this exam.            Apr 16, 2018 11:15 AM CDT   (Arrive by 11:00 AM)   Return Visit with Marcelo Trent MD   [facility] Neurosurgery ([facility])    [address] [address] [phone]              Future tests that were ordered for you today     Open Future Orders        Priority Expected Expires Ordered    CT Thoracic spine w/o contrast Routine 3/18/2018 9/18/2018 9/18/2017    XR Thor/Lumb Standing 2 Views (S[facility]) Routine 3/18/2018 9/18/2018 9/18/2017    Basic metabolic panel Routine 9/18/2017 9/25/2017 9/17/2017            Who to contact     Please call your clinic at [phone] to:    Ask questions about your health    Make or cancel appointments    Discuss your medicines    Learn about your test results    Speak to your doctor   If you have compliments or concerns about an experience at your clinic, or if you wish to file a complaint, please contact [facility] Patient Relations at [phone] or email us at [email or web address]         Additional Information About Your Visit        [employer] Information     [employer] gives you secure access to your electronic health record. If you see a primary care provider, you can also send messages to your care team and make appointments. If you have questions, please call your primary care clinic.  If you do not have a primary care provider, please call [phone] and they will assist you.      [employer] is an electronic gateway that provides easy, online access to your medical records. With [employer], you can request a clinic appointment, read your test results, renew a prescription or communicate with your care team.     To access your existing account, please contact your [facility]  Clinic or call [phone] for assistance.        Care EveryWhere ID     This is your Care EveryWhere ID. This could be used by other organizations to access your [place] medical records  [id number]        Your Vitals Were     Pulse Height                73 1.829 m (6')           Blood Pressure from Last 3 Encounters:   09/18/17 98/55   08/14/17 109/53   07/06/17 100/64    Weight from Last 3 Encounters:   08/14/17 83.1 kg (183 lb 4.8 oz)   07/06/17 80.6 kg (177 lb 11.2 oz)   06/08/17 87.6 kg (193 lb 3.2 oz)               Primary Care Provider Office Phone # Fax #    Gene Guevara [phone]       [facility] [address] [place] [address]        Equal Access to Services     STUART ELLISON : Hadii liana cerrato hadasho Soomaali, waaxda luqadaha, qaybta kaalmada adeegyada, deandre carter haycésar howard . So [facility] [phone].    ATENCIÓN: Si habla español, tiene a noe disposición servicios gratuitos de asistencia lingüística. Joey al [phone].    We comply with applicable federal civil rights laws and [place] laws. We do not discriminate on the basis of race, color, national origin, age, disability sex, sexual orientation or gender identity.            Thank you!     Thank you for choosing [facility]  for your care. Our goal is always to provide you with excellent care. Hearing back from our patients is one way we can continue to improve our services. Please take a few minutes to complete the written survey that you may receive in the mail after your visit with us. Thank you!             Your Updated Medication List - Protect others around you: Learn how to safely use, store and throw away your medicines at www.disposemymeds.org.          This list is accurate as of: 9/18/17  9:45 AM.  Always use your most recent med list.                   Brand Name Dispense Instructions for use Diagnosis    acetaminophen 325 MG tablet    TYLENOL     Take 325 mg by  mouth every 4 hours as needed for mild pain        ALPRAZolam 0.25 MG tablet    XANAX     TK 1 T PO QHS PRN    Epidural abscess       aspirin 325 MG tablet     120 tablet    Take 1 tablet (325 mg) by mouth daily    Acute midline low back pain, with sciatica presence unspecified       atorvastatin 40 MG tablet    LIPITOR    30 tablet    Take 1 tablet (40 mg) by mouth daily    Claudication of left lower extremity (H)       benzonatate 100 MG capsule    TESSALON    42 capsule    Take 1 capsule (100 mg) by mouth 3 times daily as needed for cough    Acute on chronic systolic congestive heart failure (H)       CELEXA PO      Take 10 mg by mouth daily        cholecalciferol 1000 UNITS Tabs     30 tablet    Take 1,000 Units by mouth daily    Loop diuretic causing adverse effect in therapeutic use, subsequent encounter       clopidogrel 75 MG tablet    PLAVIX    30 tablet    Take 1 tablet (75 mg) by mouth daily    Atrial flutter, unspecified type (H)       LASIX 40 MG tablet   Generic drug:  furosemide     30 tablet    Take 1 tablet (40 mg) by mouth daily If weight goes up 4-5 lbs, take an additional 1/2 tablet (20 mg) at 2 pm        * lisinopril 10 MG tablet    PRINIVIL/ZESTRIL     TK 1 T PO D    Epidural abscess       * lisinopril 5 MG tablet    PRINIVIL/ZESTRIL    90 tablet    Take 1 tablet (5 mg) by mouth daily    Essential hypertension, Chronic diastolic heart failure (H)       metFORMIN 500 MG tablet    GLUCOPHAGE    60 tablet    Take 1 tablet (500 mg) by mouth 2 times daily (with meals)    Type 2 diabetes mellitus with diabetic polyneuropathy, without long-term current use of insulin (H)       metoprolol 200 MG 24 hr tablet    TOPROL-XL    90 tablet    Take 1 tablet (200 mg) by mouth daily    Chronic systolic heart failure (H)       nitroGLYcerin 0.4 MG sublingual tablet    NITROSTAT    25 tablet    For chest pain place 1 tablet under the tongue every 5 minutes for 3 doses. If symptoms persist 5 minutes after 1st dose  call 911.    Atrial flutter, unspecified type (H)       nystatin cream    MYCOSTATIN     Apply topically 2 times daily        order for DME     1 Device    Equipment being ordered: DH2 shoe    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), Diabetic ulcer of left foot due to type 2 diabetes mellitus (H)       polyethylene glycol Packet    MIRALAX/GLYCOLAX    7 packet    Take 17 g by mouth daily as needed for constipation    Constipation, unspecified constipation type       ranitidine 150 MG tablet    ZANTAC    60 tablet    Take 1 tablet (150 mg) by mouth 2 times daily    Gastroesophageal reflux disease without esophagitis       senna-docusate 8.6-50 MG per tablet    SENOKOT-S;PERICOLACE    100 tablet    Take 1-2 tablets by mouth 2 times daily as needed (constipation )    Constipation, unspecified constipation type       spironolactone 25 MG tablet    ALDACTONE    45 tablet    Take 0.5 tablets (12.5 mg) by mouth daily    Chronic systolic congestive heart failure (H)       tamsulosin 0.4 MG capsule    FLOMAX    60 capsule    Take 2 capsules (0.8 mg) by mouth At Bedtime    Renal insufficiency       * Notice:  This list has 2 medication(s) that are the same as other medications prescribed for you. Read the directions carefully, and ask your doctor or other care provider to review them with you.

## 2017-09-18 NOTE — PROGRESS NOTES
HPI  Mr. Manning is a 76 year old man with a history of CAD, ICM, HTN, HLD, atrial flutter, s/p popliteal bypass and paraspinal abscess s/p surgical decompression with IV antibiotics. He was last seen in CORE clinic a month ago when we switched him from lopressor to Toprol. He returns for follow up.    Tad has been feeling well. He has had a fall 2 weeks ago after standing and felt lightheaded. No head injury. No shortness of breath, chest pain, palpitations, ankle edema. He continues to sleep with the head of bed elevated but denies PND. Appetite is good and continues to improve.     PMH  Past Medical History:   Diagnosis Date     Arthritis      ASCVD (arteriosclerotic cardiovascular disease)      BMI 30.0-30.9,adult      BPH (benign prostatic hypertrophy)      Cellulitis      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)      Coronary artery disease     triple bypass 1995     Diabetes mellitus (H)      Diabetic polyneuropathy (H)      History of blood transfusion      Hyperlipidaemia      Hypertension      Malignant neoplasm (H)     CLL     Noninflammatory pericardial effusion      Osteomyelitis of left foot (H)      PVD (peripheral vascular disease) (H)      Sebaceous cyst      Social History     Social History     Marital status: Single     Spouse name: N/A     Number of children: N/A     Years of education: N/A     Occupational History     Not on file.     Social History Main Topics     Smoking status: Former Smoker     Packs/day: 2.00     Years: 30.00     Types: Cigarettes     Quit date: 6/19/1995     Smokeless tobacco: Never Used     Alcohol use Yes      Comment: 1 drink monthly     Drug use: No     Sexual activity: Not on file     Other Topics Concern     Not on file     Social History Narrative    Lives independently with SO. 2/9/2017      Family History   Problem Relation Age of Onset     CANCER Mother      leukemia     MEDS   Current Outpatient Prescriptions on File  Prior to Visit:  ALPRAZolam (XANAX) 0.25 MG tablet TK 1 T PO QHS PRN   lisinopril (PRINIVIL/ZESTRIL) 5 MG tablet Take 1 tablet (5 mg) by mouth daily   acetaminophen (TYLENOL) 325 MG tablet Take 325 mg by mouth every 4 hours as needed for mild pain   metoprolol (TOPROL-XL) 200 MG 24 hr tablet Take 1 tablet (200 mg) by mouth daily   furosemide (LASIX) 40 MG tablet Take 1 tablet (40 mg) by mouth daily If weight goes up 4-5 lbs, take an additional 1/2 tablet (20 mg) at 2 pm   Citalopram Hydrobromide (CELEXA PO) Take 10 mg by mouth daily   spironolactone (ALDACTONE) 25 MG tablet Take 0.5 tablets (12.5 mg) by mouth daily   nystatin (MYCOSTATIN) cream Apply topically 2 times daily   aspirin 325 MG tablet Take 1 tablet (325 mg) by mouth daily (Patient taking differently: Take 81 mg by mouth daily )   atorvastatin (LIPITOR) 40 MG tablet Take 1 tablet (40 mg) by mouth daily   clopidogrel (PLAVIX) 75 MG tablet Take 1 tablet (75 mg) by mouth daily   metFORMIN (GLUCOPHAGE) 500 MG tablet Take 1 tablet (500 mg) by mouth 2 times daily (with meals)   cholecalciferol 1000 UNITS TABS Take 1,000 Units by mouth daily   tamsulosin (FLOMAX) 0.4 MG capsule Take 2 capsules (0.8 mg) by mouth At Bedtime   nitroglycerin (NITROSTAT) 0.4 MG sublingual tablet For chest pain place 1 tablet under the tongue every 5 minutes for 3 doses. If symptoms persist 5 minutes after 1st dose call 911.   order for DME Equipment being ordered: DH2 shoe   lisinopril (PRINIVIL/ZESTRIL) 10 MG tablet TK 1 T PO D   polyethylene glycol (MIRALAX/GLYCOLAX) Packet Take 17 g by mouth daily as needed for constipation (Patient not taking: Reported on 9/18/2017)   ranitidine (ZANTAC) 150 MG tablet Take 1 tablet (150 mg) by mouth 2 times daily (Patient not taking: Reported on 9/18/2017)   senna-docusate (SENOKOT-S;PERICOLACE) 8.6-50 MG per tablet Take 1-2 tablets by mouth 2 times daily as needed (constipation ) (Patient not taking: Reported on 9/18/2017)   benzonatate  (TESSALON) 100 MG capsule Take 1 capsule (100 mg) by mouth 3 times daily as needed for cough (Patient not taking: Reported on 9/18/2017)     No current facility-administered medications on file prior to visit.     Physical examination:  BP 93/57  Pulse 71  Ht 1.829 m (6')  Wt 82.1 kg (181 lb)  SpO2 98%  BMI 24.55 kg/m2  GENERAL APPEARANCE: healthy, alert and no distress  HEENT: no icterus, no xanthelasmas, normal pupil size and reaction, normal palate, mucosa moist, no cyanosis.  NECK: no adenopathy, no asymmetry, masses, or scars  CHEST: lungs clear to auscultation - no rales, rhonchi or wheezes, no use of accessory muscles, no retractions, respirations are unlabored, normal respiratory rate, no kyphosis, no scoliosis  CARDIOVASCULAR: regular rhythm, normal S1 with physiologic split S2, no S3 or S4 and no murmur, click or rub, precordium quiet with normal PMI. No JVD  ABDOMEN: soft, non tender, without hepatomegaly, no masses palpable, bowel sounds normal  EXTREMITIES: no clubbing, cyanosis or edema  NEURO: alert and oriented to person/place/time, normal speech, gait and affect  SKIN: no ecchymoses, no rashes    LABS  Last Basic Metabolic Panel:  Lab Results   Component Value Date     09/18/2017      Lab Results   Component Value Date    POTASSIUM 4.6 09/18/2017     Lab Results   Component Value Date    CHLORIDE 102 09/18/2017     Lab Results   Component Value Date    JUSTINO 9.5 09/18/2017     Lab Results   Component Value Date    CO2 27 09/18/2017     Lab Results   Component Value Date    BUN 27 09/18/2017     Lab Results   Component Value Date    CR 1.21 09/18/2017     Lab Results   Component Value Date     09/18/2017         ECHO  Recent Results (from the past 4320 hour(s))   Echo limited with definity    Narrative    [id number]  [id number]  [id number]  [id number]^LUIGI^YANELY^           [facility],[place]  Echocardiography Laboratory  [address] [place] [id number]      Name: PETER MANNING  MRN: [id number]  : 1940  Study Date: 2017 12:24 PM  Age: 76 yrs  Gender: Male  Patient Location: [facility]  Reason For Study: Arrhythmia  Ordering Physician: YANELY MEYERS  Performed By: KATI Austin     BSA: 2.1 m2  Height: 72 in  Weight: 190 lb  BP: 128/64 mmHg  _____________________________________________________________________________  __        Procedure  Complete Portable Echo Adult. Contrast Optison. Technically difficult study.  Optison (NDC #2776-7107-63) given intravenously. Patient was given 6 ml  mixture of 3 ml Optison and 6 ml saline. 3 ml wasted. IV start location R  Upper arm . The patient's rhythm is atrial fibrillation.  _____________________________________________________________________________  __        Interpretation Summary  Technically difficult study.The patient's rhythm is atrial fibrillation.  The left ventricle is severely dilated. Left ventricular systolic function is  severely reduced, ejection fraction is estimated at 25-30%. There is severe  global hypokinesis.  Right ventricular systolic function is mildly reduced.  The right ventricular systolic pressure is approximated at 15 mmHg plus the  right atrial pressure. IVC is plethoric and estimated mean RA pressure is 15  mmHg.  No pericardial effusion present.  _____________________________________________________________________________  __     _____________________________________________________________________________  __     MMode/2D Measurements & Calculations     EF(MOD-bp): 30.0 %        Doppler Measurements & Calculations  TR max alessia: 190.0 cm/sec  TR max P.4 mmHg           _____________________________________________________________________________  __           Report approved by: Edward Snyder 2017 05:09 PM        Assessment and Plan  Mr. Manning is a pleasant 76 year old man with a history of CAD, ICM, HTN, HLD, atrial flutter, s/p popliteal bypass who  appears well though his renal function has gradually been trending up and his blood pressures have been low. I suspect he is mildly hypovolemic. He will reduce Lasix to 20 mg daily, though advised he may take an additional 20 mg prn. He again is not interested in pursuing more intervention though is feeling stronger. He will return to see Dr. Valdes in 6 weeks and CORE prn.    1. Chronic systolic heart failure secondary to ICM  NYHA II-IIIA (confounded my immobility)  Stage C  ACE/ARB -yes - lisinopril  BB - yes - Toprol  Ald Ant - yes - Spironolactone 12.5 mg  SCD prevention -- deferred given recent osteomyelitis and pending complete evaluation of CAD  Apnea--was not discussed today, risk factors include gender, age, and history of CAD  Volume    2. CAD. On a statin, beta blocker, ASA, and Plavix. He has deferred further intervention until feeling stronger and cleared of osteomyelitis    3. Atrial Flutter. He has not been anticoagulated due to fall risk. He is on ASA and Plavix plus beta blocker    20 minutes spent in direct care, >50% in counseling

## 2017-09-18 NOTE — PATIENT INSTRUCTIONS
"You were seen today in the Cardiovascular Clinic at the [facility].     Cardiology Providers you saw during your visit: Jess Chi NP     1. Please reduce Lasix to 20 mg daily.    2. If you notice more swelling, shortness of breath, or any concerns, don't hesitate to call    3. Please return to clinic in ~6 weeks          Please limit your fluid intake to 2 L (64 ounces) daily.  2 Liters a day = 8.5 cups, or 72 ounces.  Please limit your salt intake to 2 grams a day or less.     If you gain 2# in 24 hours or 5# in one week call Nicole Hollingsworth RN so we can adjust your medications as needed over the phone.     Please feel free to call me with any questions or concerns.       Nicole Hollingsworth RN BSN CHFN  [facility]  Cardiology Care Coordinator-Heart Failure Clinic     Questions and [phone].   First press #1 for the [place] and then press #3 for \"Medical Questions\" to reach us Cardiology Nurses.      On Call Cardiologist for after hours or on weekends: [phone]   option #4 and ask to speak to the on-call Cardiologist. Inform them you are a CORE/heart failure patient at the [place].           If you need a medication refill please contact your pharmacy.  Please allow 3 business days for your refill to be completed.  _______________________________________________________  C.O.R.E. CLINIC Cardiomyopathy, Optimization, Rehabilitation, Education   The C.O.R.E. CLINIC is a heart failure specialty clinic within the [facility] where you will work with specialized nurse practitioners dedicated to helping patients with heart failure carefully adjust medications, receive education, and learn who and when to call if symptoms develop. They specialize in helping you better understand your condition, slow the progression of your disease, improve the length and quality of your life, help you detect future heart problems before " they become life threatening, and avoid hospitalizations.  As always, thank you for trusting us with your health care needs!

## 2017-09-18 NOTE — LETTER
9/18/2017       RE: Tad Manning  [address]   [facility] [address]     Dear Colleague,    Thank you for referring your patient, Tad Manning, to the [facility] NEUROSURGERY at [facility]. Please see a copy of my visit note below.    It was a pleasure to see Tad Manning today in Neurosurgery Clinic. he is a 76 year old male who underwent:    DATE OF OPERATION:  02/09/2017       PREOPERATIVE DIAGNOSIS:  Epidural abscess, osteomyelitis.       POSTOPERATIVE DIAGNOSIS:  Epidural abscess, osteomyelitis.       PROCEDURES PERFORMED:   1.  T9-T10 laminectomy.   2.  T9 transpedicular left-sided transpedicular disk space abscess evacuation.       SURGEON:  Marcelo Trent MD.       :  Ileana Floyd MD       And on   2/12/17  PREOPERATIVE DIAGNOSIS:    1.  Thoracic spine instability.   2.  Osteomyelitis and discitis T9-T10 s/p laminectomy.       POSTOPERATIVE DIAGNOSIS:    1.  Thoracic spine instability.   2.  Osteomyelitis and discitis T9-T10 s/p laminectomy.           PROCEDURES PERFORMED:   1.  T7-T12 posterior segmental instrumentation.   2.  Transpedicular, transforaminal right-sided T9-T10 interbody fusion.   3.  Posterior arthrodesis T7-T12.  4.  Use of intraoperative neuro-navigation.    5.  Use of intraoperative O-arm.   6.  Use of intraoperative cell saver.     7.  Use of allograft bone.       ATTENDING SURGEON:  Marcelo Trent MD       ASSISTANT:  Kunal Ramos MD     He returns for routine follow up. He is doing reasonably well. He has improved neurologic function from the time of admission and walks with a walker. Continues to have balance issues.    Vitals:    09/18/17 0910   BP: 98/55   Pulse: 73   Height: 1.829 m (6')     There is no height or weight on file to calculate BMI.  No Pain (0)    Awake. Alert.    Incision well healed.    Strength 4+/5 BLE. Able to stand and walk with walker without difficulty.    Imaging:  Stable hardware and alignment. The imaging was shown to the patient and reviewed in clinic.     A: s/p thoracic fusion and decompression for osteomyelitis and epidural abscess.    P: RTC 6m with CT of T spine and standing scoli films.        Again, thank you for allowing me to participate in the care of your patient.      Sincerely,    Marcelo Trent MD

## 2017-09-20 NOTE — PROGRESS NOTES
It was a pleasure to see Tad Manning today in [facility]surgery [facility]. he is a 76 year old male who underwent:    DATE OF OPERATION:  02/09/2017       PREOPERATIVE DIAGNOSIS:  Epidural abscess, osteomyelitis.       POSTOPERATIVE DIAGNOSIS:  Epidural abscess, osteomyelitis.       PROCEDURES PERFORMED:   1.  T9-T10 laminectomy.   2.  T9 transpedicular left-sided transpedicular disk space abscess evacuation.       SURGEON:  Marcelo Trent MD.       :  Ileana Floyd MD       And on   2/12/17  PREOPERATIVE DIAGNOSIS:    1.  Thoracic spine instability.   2.  Osteomyelitis and discitis T9-T10 s/p laminectomy.       POSTOPERATIVE DIAGNOSIS:    1.  Thoracic spine instability.   2.  Osteomyelitis and discitis T9-T10 s/p laminectomy.           PROCEDURES PERFORMED:   1.  T7-T12 posterior segmental instrumentation.   2.  Transpedicular, transforaminal right-sided T9-T10 interbody fusion.   3.  Posterior arthrodesis T7-T12.  4.  Use of intraoperative neuro-navigation.    5.  Use of intraoperative O-arm.   6.  Use of intraoperative cell saver.     7.  Use of allograft bone.       ATTENDING SURGEON:  Marcelo Trent MD       ASSISTANT:  Kunal Ramos MD     He returns for routine follow up. He is doing reasonably well. He has improved neurologic function from the time of admission and walks with a walker. Continues to have balance issues.    Vitals:    09/18/17 0910   BP: 98/55   Pulse: 73   Height: 1.829 m (6')     There is no height or weight on file to calculate BMI.  No Pain (0)    Awake. Alert.    Incision well healed.    Strength 4+/5 BLE. Able to stand and walk with walker without difficulty.    Imaging: Stable hardware and alignment. The imaging was shown to the patient and reviewed in clinic.     A: s/p thoracic fusion and decompression for osteomyelitis and epidural abscess.    P: RTC 6m with CT of T spine and standing scoli films.

## 2017-09-26 DIAGNOSIS — G06.1 SPINAL EPIDURAL ABSCESS: Primary | ICD-10-CM

## 2017-10-02 ENCOUNTER — MEDICAL CORRESPONDENCE (OUTPATIENT)
Dept: HEALTH INFORMATION MANAGEMENT | Facility: CLINIC | Age: 77
End: 2017-10-02

## 2017-10-02 ENCOUNTER — THERAPY VISIT (OUTPATIENT)
Dept: PHYSICAL THERAPY | Facility: CLINIC | Age: 77
End: 2017-10-02
Payer: MEDICARE

## 2017-10-02 DIAGNOSIS — M62.81 GENERALIZED MUSCLE WEAKNESS: Primary | ICD-10-CM

## 2017-10-02 PROCEDURE — 97110 THERAPEUTIC EXERCISES: CPT | Mod: GP | Performed by: PHYSICAL THERAPIST

## 2017-10-02 PROCEDURE — 97112 NEUROMUSCULAR REEDUCATION: CPT | Mod: GP | Performed by: PHYSICAL THERAPIST

## 2017-10-02 PROCEDURE — 97161 PT EVAL LOW COMPLEX 20 MIN: CPT | Mod: GP | Performed by: PHYSICAL THERAPIST

## 2017-10-02 PROCEDURE — G8979 MOBILITY GOAL STATUS: HCPCS | Mod: GP | Performed by: PHYSICAL THERAPIST

## 2017-10-02 PROCEDURE — G8978 MOBILITY CURRENT STATUS: HCPCS | Mod: GP | Performed by: PHYSICAL THERAPIST

## 2017-10-02 NOTE — PROGRESS NOTES
Subjective:    Patient is a 76 year old male presenting with rehab left ankle/foot hpi.                                      Pertinent medical history includes:  Cancer, diabetes, high blood pressure, heart problems and sleep disorder/apnea.  Medical allergies: no.  Other surgeries include:  Heart surgery, orthopedic surgery and other.  Current medications:  Sleep medication and high blood pressure medication.  Current occupation is Sales.                                    Objective:    System    Physical Exam    General     ROS    Assessment/Plan:

## 2017-10-02 NOTE — LETTER
DEPARTMENT OF HEALTH AND HUMAN SERVICES  [facility] MEDICA[facility]    PLAN/UPDATED PLAN OF PROGRESS FOR OUTPATIENT REHABILITATION    PATIENTS NAME:  Tad Manning     : 1940    PROVID[facility] NUMBER:    [id number]    [facility]    [facility]: [facility]    MEDICAL RECORD NUMBER: [id number]     START OF CARE DATE:  SOC Date: 10/02/17   TYPE:  PT    PRIMARY/TREATMENT DIAGNOSIS: (Pertinent Medical Diagnosis)  Generalized muscle weakness    VISITS FROM START OF CARE:  Rxs Used: 1     Subjective:    Tad Manning is a 76 year old male with weakness and history of previous falls condition which occurred with previous surgery.  This is a new condition  Pt presents to clinic with complaints of muscle weakness and balance impairments. Pt had spinal surgery on 17 to remove epidural abcess in thoracic spine. Pt reports having frequent falls, before and after surgery. Pt spent 3 months in transitional care after surgery. Currently, pt is using 4WW for all mobility. Pt is motivated to begin HEP with goals of walking without AD and being able to drive again. Pt had MD appointment on 17 and referred to PT. Site of Pain: no pain. Radiates to: no pain. Quality: none.     and is intermittent Pain Scale: 0/10. Associated symptoms:  Buckling/giving out, loss of strength, numbness and tingling. Pain is worse during the day.  Symptoms are exacerbated by activity, ascending stairs, standing, walking and bending/squatting and relieved by activity/movement. Since onset symptoms are gradually improving. Special testing: none.    Previous treatment includes physical therapy (in-patient and home PT). There was moderate improvement following previous treatment.    General health as reported by patient is good.  Pertinent medical history includes:  Cancer, diabetes, high blood pressure, heart problems and sleep disorder/apnea.  Medical allergies: no.  Other surgeries include:  Heart  surgery, orthopedic surgery and other.  Current medications:  Sleep medication and high blood pressure medication.  Current occupation is Sales.       Barriers include:  Transportation.  Red flags: none.     Objective:    Gait:    Assistive Devices:  Walker  Deviations:  Lumbar:  Trunk flexionGeneral Deviations:  Stance time decr and stride length decr        PATIENTS NAME:  Tad Manning   : 1940-Page 2    Flexibility/Screens:   Lower Extremity:  Decreased left lower extremity flexibility:Quadriceps and Hamstrings  Decreased right lower extremity flexibility:  Quadriceps and Hamstrings  Lumbar/SI Evaluation  ROM:    AROM Lumbar:   Flexion:            To knee  Ext:                    50%   Side Bend:        Left:  50%    Right:  50%  Rotation:           Left:  50%    Right:  50%  Side Glide:        Left:     Right:   Strength: poor abdominal strength  Hip Evaluation  Hip PROM:  Hip PROM:  Left Hip:    Normal  Right Hip:  Normal  Hip Strength:    Flexion:   Left: 4+/5   Pain:  Right: 4+/5   Pain:  Extension:  Left: 4/5  Pain:Right: 4/5    Pain:    Abduction:  Left: 4-/5     Pain:Right: 4-/5    Pain:  Knee Flexion:  Left: 5/5   Pain:Right: 5/5   Pain:  Knee Extension:  Left: 5/5   Pain:Right: 5/5    Pain:  Hip Special Testing:    Left hip positive for the following special tests:  Deb's  Left hip negative for the following special tests:  Eli   Right hip positive for the following special tests:  Deb'sRight hip negative for the following special tests:  Eli    Hip Palpation:  Normal   Functional Testing:    Quad:    Bilateral leg squat:  Moderate loss of control and excessive anterior knee excursion   Proprioception:  Stork balance test:   Left:    EO <1 sec-required UE support  Right:  EO <1 sec-required UE support  % of Uninvolved:     Assessment/Plan:      Patient is a 76 year old male with thoracic complaints.    Patient has the following significant findings with corresponding treatment plan.                 Diagnosis 1:  Generalized weakness, balance impairments  Pain -  hot/cold therapy, manual therapy, self management, education and home program  Decreased ROM/flexibility - manual therapy, therapeutic exercise, therapeutic activity and home program  Decreased strength - therapeutic exercise, therapeutic activities and home program  Impaired balance - neuro re-education, therapeutic activities and home program  Impaired gait - gait training, assistive devices and home program  Impaired muscle performance - neuro re-education and home program  Decreased function - therapeutic activities and home program  PATIENTS NAME:  Tad Manning   : 1940-Page 3    Therapy Evaluation Codes:   1) History comprised of:   Personal factors that impact the plan of care:      None.    Comorbidity factors that impact the plan of care are:      Cancer, Diabetes, Heart problems, High blood pressure and Sleep disorder/apnea.     Medications impacting care: High blood pressure and Sleep.  2) Examination of Body Systems comprised of:   Body structures and functions that impact the plan of care:      Knee and Thoracic Spine.   Activity limitations that impact the plan of care are:      Bending, Squatting/kneeling, Stairs, Standing and Walking.  3) Clinical presentation characteristics are:   Stable/Uncomplicated.  4) Decision-Making    Low complexity using standardized patient assessment instrument and/or measureable assessment of functional outcome.  Cumulative Therapy Evaluation is: Low complexity.    Previous and current functional limitations:  (See Goal Flow Sheet for this information)    Short term and Long term goals: (See Goal Flow Sheet for this information)     Communication ability:  Patient appears to be able to clearly communicate and understand verbal and written communication and follow directions correctly.  Treatment Explanation - The following has been discussed with the patient:   RX ordered/plan of  "care  Anticipated outcomes  Possible risks and side effects  This patient would benefit from PT intervention to resume normal activities.   Rehab potential is good.    Frequency:  1 X week, once daily  Duration:  for 10 weeks  Discharge Plan:  Achieve all LTG.  Independent in home treatment program.  Return to previous functional level by discharge.  Reach maximal therapeutic benefit.      Caregiver Signature/Credentials _____________________________ Date ________       Treating Provider: Efrem Doe DPT   I have reviewed and certified the need for these services and plan of treatment while under my care.        PHYSICIAN'S SIGNATURE:   _________________________________________  Date___________   Marcelo Trent MD  Certification period:  Beginning of Cert date period: 10/02/17 to  End of Cert period date: 17   PATIENTS NAME:  Tad Manning   : 1940-page 4    Functional Level Progress Report: Please see attached \"Goal Flow sheet for Functional level.\"    ____X____ Continue Services or       ________ DC Services                Service dates: From  SOC Date: 10/02/17 date to present                         "

## 2017-10-02 NOTE — PROGRESS NOTES
Subjective:    Patient is a 76 year old male presenting with rehab general hpi.   Tad Manning is a 76 year old male with weakness and history of previous falls condition which occurred with previous surgery.      This is a new condition  Pt presents to clinic with complaints of muscle weakness and balance impairments. Pt had spinal surgery on 2/8/17 to remove epidural abcess in thoracic spine. Pt reports having frequent falls, before and after surgery. Pt spent 3 months in transitional care after surgery. Currently, pt is using 4WW for all mobility. Pt is motivated to begin HEP with goals of walking without AD and being able to drive again. Pt had MD appointment on 9/18/17 and referred to PT. .    Site of Pain: no pain. Radiates to: no pain. Quality: none.     and is intermittent Pain Scale: 0/10. Associated symptoms:  Buckling/giving out, loss of strength, numbness and tingling. Pain is worse during the day.  Symptoms are exacerbated by activity, ascending stairs, standing, walking and bending/squatting and relieved by activity/movement. Since onset symptoms are gradually improving. Special testing: none.    Previous treatment includes physical therapy (in-patient and home PT). There was moderate improvement following previous treatment.    General health as reported by patient is good.             Barriers include:  Transportation.        Red flags: none.                      Objective:      Gait:    Assistive Devices:  Walker  Deviations:  Lumbar:  Trunk flexionGeneral Deviations:  Stance time decr and stride length decr    Flexibility/Screens:       Lower Extremity:  Decreased left lower extremity flexibility:Quadriceps and Hamstrings    Decreased right lower extremity flexibility:  Quadriceps and Hamstrings               Lumbar/SI Evaluation  ROM:    AROM Lumbar:   Flexion:            To knee  Ext:                    50%   Side Bend:        Left:  50%    Right:  50%  Rotation:           Left:  50%     Right:  50%  Side Glide:        Left:     Right:         Strength: poor abdominal strength                                                      Hip Evaluation  Hip PROM:  Hip PROM:  Left Hip:    Normal  Right Hip:  Normal                          Hip Strength:    Flexion:   Left: 4+/5   Pain:  Right: 4+/5   Pain:                    Extension:  Left: 4/5  Pain:Right: 4/5    Pain:    Abduction:  Left: 4-/5     Pain:Right: 4-/5    Pain:        Knee Flexion:  Left: 5/5   Pain:Right: 5/5   Pain:  Knee Extension:  Left: 5/5   Pain:Right: 5/5    Pain:        Hip Special Testing:    Left hip positive for the following special tests:  Deb's  Left hip negative for the following special tests:  Eli   Right hip positive for the following special tests:  Deb'sRight hip negative for the following special tests:  Eli    Hip Palpation:  Normal         Functional Testing:          Quad:      Bilateral leg squat:  Moderate loss of control and excessive anterior knee excursion     Proprioception:    Stork balance test:   Left:    EO <1 sec-required UE support  Right:  EO <1 sec-required UE support  % of Uninvolved:                [facility]    [facility] Initial Evaluation    Assessment/Plan:      Patient is a 76 year old male with thoracic complaints.    Patient has the following significant findings with corresponding treatment plan.                Diagnosis 1:  Generalized weakness, balance impairments  Pain -  hot/cold therapy, manual therapy, self management, education and home program  Decreased ROM/flexibility - manual therapy, therapeutic exercise, therapeutic activity and home program  Decreased strength - therapeutic exercise, therapeutic activities and home program  Impaired balance - neuro re-education, therapeutic activities and home program  Impaired gait - gait training, assistive devices and home program  Impaired muscle performance - neuro re-education and home program  Decreased  function - therapeutic activities and home program    Therapy Evaluation Codes:   1) History comprised of:   Personal factors that impact the plan of care:      None.    Comorbidity factors that impact the plan of care are:      Cancer, Diabetes, Heart problems, High blood pressure and Sleep disorder/apnea.     Medications impacting care: High blood pressure and Sleep.  2) Examination of Body Systems comprised of:   Body structures and functions that impact the plan of care:      Knee and Thoracic Spine.   Activity limitations that impact the plan of care are:      Bending, Squatting/kneeling, Stairs, Standing and Walking.  3) Clinical presentation characteristics are:   Stable/Uncomplicated.  4) Decision-Making    Low complexity using standardized patient assessment instrument and/or measureable assessment of functional outcome.  Cumulative Therapy Evaluation is: Low complexity.    Previous and current functional limitations:  (See Goal Flow Sheet for this information)    Short term and Long term goals: (See Goal Flow Sheet for this information)     Communication ability:  Patient appears to be able to clearly communicate and understand verbal and written communication and follow directions correctly.  Treatment Explanation - The following has been discussed with the patient:   RX ordered/plan of care  Anticipated outcomes  Possible risks and side effects  This patient would benefit from PT intervention to resume normal activities.   Rehab potential is good.    Frequency:  1 X week, once daily  Duration:  for 10 weeks  Discharge Plan:  Achieve all LTG.  Independent in home treatment program.  Return to previous functional level by discharge.  Reach maximal therapeutic benefit.    Please refer to the daily flowsheet for treatment today, total treatment time and time spent performing 1:1 timed codes.

## 2017-10-11 ENCOUNTER — THERAPY VISIT (OUTPATIENT)
Dept: PHYSICAL THERAPY | Facility: CLINIC | Age: 77
End: 2017-10-11
Payer: MEDICARE

## 2017-10-11 DIAGNOSIS — M62.81 GENERALIZED MUSCLE WEAKNESS: ICD-10-CM

## 2017-10-11 PROCEDURE — 97112 NEUROMUSCULAR REEDUCATION: CPT | Mod: GP | Performed by: PHYSICAL THERAPIST

## 2017-10-11 PROCEDURE — 97110 THERAPEUTIC EXERCISES: CPT | Mod: GP | Performed by: PHYSICAL THERAPIST

## 2017-10-18 ENCOUNTER — THERAPY VISIT (OUTPATIENT)
Dept: PHYSICAL THERAPY | Facility: CLINIC | Age: 77
End: 2017-10-18
Payer: MEDICARE

## 2017-10-18 DIAGNOSIS — M62.81 GENERALIZED MUSCLE WEAKNESS: ICD-10-CM

## 2017-10-18 PROCEDURE — 97110 THERAPEUTIC EXERCISES: CPT | Mod: GP | Performed by: PHYSICAL THERAPIST

## 2017-10-18 PROCEDURE — 97112 NEUROMUSCULAR REEDUCATION: CPT | Mod: GP | Performed by: PHYSICAL THERAPIST

## 2017-10-24 ENCOUNTER — PRE VISIT (OUTPATIENT)
Dept: CARDIOLOGY | Facility: CLINIC | Age: 77
End: 2017-10-24

## 2017-10-24 DIAGNOSIS — I50.22 CHRONIC SYSTOLIC HEART FAILURE (H): Primary | ICD-10-CM

## 2017-10-26 ENCOUNTER — THERAPY VISIT (OUTPATIENT)
Dept: PHYSICAL THERAPY | Facility: CLINIC | Age: 77
End: 2017-10-26
Payer: MEDICARE

## 2017-10-26 DIAGNOSIS — M62.81 GENERALIZED MUSCLE WEAKNESS: ICD-10-CM

## 2017-10-26 PROCEDURE — 97112 NEUROMUSCULAR REEDUCATION: CPT | Mod: GP | Performed by: PHYSICAL THERAPIST

## 2017-10-26 PROCEDURE — 97110 THERAPEUTIC EXERCISES: CPT | Mod: GP | Performed by: PHYSICAL THERAPIST

## 2017-10-30 ENCOUNTER — OFFICE VISIT (OUTPATIENT)
Dept: CARDIOLOGY | Facility: CLINIC | Age: 77
End: 2017-10-30
Attending: INTERNAL MEDICINE
Payer: MEDICARE

## 2017-10-30 VITALS
HEIGHT: 72 IN | OXYGEN SATURATION: 93 % | BODY MASS INDEX: 25.45 KG/M2 | WEIGHT: 187.9 LBS | HEART RATE: 72 BPM | DIASTOLIC BLOOD PRESSURE: 64 MMHG | SYSTOLIC BLOOD PRESSURE: 110 MMHG

## 2017-10-30 DIAGNOSIS — I50.22 CHRONIC SYSTOLIC HEART FAILURE (H): ICD-10-CM

## 2017-10-30 DIAGNOSIS — E78.2 MIXED HYPERLIPIDEMIA: ICD-10-CM

## 2017-10-30 DIAGNOSIS — I10 ESSENTIAL HYPERTENSION: ICD-10-CM

## 2017-10-30 DIAGNOSIS — I50.22 CHRONIC SYSTOLIC CONGESTIVE HEART FAILURE (H): Primary | ICD-10-CM

## 2017-10-30 DIAGNOSIS — I25.10 CORONARY ARTERY DISEASE INVOLVING NATIVE CORONARY ARTERY OF NATIVE HEART WITHOUT ANGINA PECTORIS: ICD-10-CM

## 2017-10-30 LAB
ANION GAP SERPL CALCULATED.3IONS-SCNC: 9 MMOL/L (ref 3–14)
BUN SERPL-MCNC: 30 MG/DL (ref 7–30)
CALCIUM SERPL-MCNC: 9 MG/DL (ref 8.5–10.1)
CHLORIDE SERPL-SCNC: 107 MMOL/L (ref 94–109)
CO2 SERPL-SCNC: 24 MMOL/L (ref 20–32)
CREAT SERPL-MCNC: 1.09 MG/DL (ref 0.66–1.25)
GFR SERPL CREATININE-BSD FRML MDRD: 66 ML/MIN/1.7M2
GLUCOSE SERPL-MCNC: 153 MG/DL (ref 70–99)
POTASSIUM SERPL-SCNC: 4.4 MMOL/L (ref 3.4–5.3)
SODIUM SERPL-SCNC: 140 MMOL/L (ref 133–144)

## 2017-10-30 PROCEDURE — 80048 BASIC METABOLIC PNL TOTAL CA: CPT | Performed by: INTERNAL MEDICINE

## 2017-10-30 PROCEDURE — 36415 COLL VENOUS BLD VENIPUNCTURE: CPT | Performed by: INTERNAL MEDICINE

## 2017-10-30 PROCEDURE — 99215 OFFICE O/P EST HI 40 MIN: CPT | Mod: GC | Performed by: INTERNAL MEDICINE

## 2017-10-30 PROCEDURE — 93005 ELECTROCARDIOGRAM TRACING: CPT | Mod: ZF

## 2017-10-30 PROCEDURE — 93010 ELECTROCARDIOGRAM REPORT: CPT | Mod: ZP | Performed by: INTERNAL MEDICINE

## 2017-10-30 PROCEDURE — 99212 OFFICE O/P EST SF 10 MIN: CPT | Mod: 25,ZF

## 2017-10-30 ASSESSMENT — PAIN SCALES - GENERAL: PAINLEVEL: NO PAIN (0)

## 2017-10-30 NOTE — LETTER
10/30/2017      RE: Tad Manning  [address]   [facility] [id number]       Dear Colleague,    Thank you for the opportunity to participate in the care of your patient, Tad Manning, at the [facility] at [facility]. Please see a copy of my visit note below.            Chief Complaint: Evaluation of HFrEF/ICM    HPI:   Mr. Tad Manning is a 76-year old male with a past medical history of CAD c/b ICM, atrial flutter, earlier in the year admitted for discitis and multple admission for decompensated ICM/HFrEF who presented for follow-up of cardiomyopathy. He was noted to have a depressed LVEF of 25-30%. He had previously undergone a triple vessel CAB in 1995 and has hypertension, diabetes and dyslipidemia.     After his stay at the [facility], he is home now and recuperating well. He is without specific cardiac complaints. He is on a good medical regimen that he is compliant with. His weight is up and his appetite is much improved.    ROS otherwise negative    Current Outpatient Prescriptions   Medication Sig Dispense Refill     aspirin 81 MG tablet Take 1 tablet (81 mg) by mouth daily       ALPRAZolam (XANAX) 0.25 MG tablet TK 1 T PO QHS PRN  1     lisinopril (PRINIVIL/ZESTRIL) 10 MG tablet TK 1 T PO D  1     furosemide (LASIX) 20 MG tablet Take 1 tablet (20 mg) by mouth daily 90 tablet 3     lisinopril (PRINIVIL/ZESTRIL) 5 MG tablet Take 1 tablet (5 mg) by mouth daily 90 tablet 3     acetaminophen (TYLENOL) 325 MG tablet Take 325 mg by mouth every 4 hours as needed for mild pain       metoprolol (TOPROL-XL) 200 MG 24 hr tablet Take 1 tablet (200 mg) by mouth daily 90 tablet 3     Citalopram Hydrobromide (CELEXA PO) Take 10 mg by mouth daily       spironolactone (ALDACTONE) 25 MG tablet Take 0.5 tablets (12.5 mg) by mouth daily 45 tablet 3     nystatin (MYCOSTATIN) cream Apply topically 2 times daily       atorvastatin (LIPITOR) 40 MG tablet Take  1 tablet (40 mg) by mouth daily 30 tablet 1     clopidogrel (PLAVIX) 75 MG tablet Take 1 tablet (75 mg) by mouth daily 30 tablet      metFORMIN (GLUCOPHAGE) 500 MG tablet Take 1 tablet (500 mg) by mouth 2 times daily (with meals) 60 tablet      cholecalciferol 1000 UNITS TABS Take 1,000 Units by mouth daily 30 tablet      tamsulosin (FLOMAX) 0.4 MG capsule Take 2 capsules (0.8 mg) by mouth At Bedtime 60 capsule      nitroglycerin (NITROSTAT) 0.4 MG sublingual tablet For chest pain place 1 tablet under the tongue every 5 minutes for 3 doses. If symptoms persist 5 minutes after 1st dose call 911. 45 tablet      order for DME Equipment being ordered: DH2 shoe 1 Device 0     ranitidine (ZANTAC) 150 MG tablet Take 1 tablet (150 mg) by mouth 2 times daily (Patient not taking: Reported on 9/18/2017) 60 tablet      benzonatate (TESSALON) 100 MG capsule Take 1 capsule (100 mg) by mouth 3 times daily as needed for cough (Patient not taking: Reported on 9/18/2017) 42 capsule        Past Medical History:   Diagnosis Date     Arthritis      ASCVD (arteriosclerotic cardiovascular disease)      BMI 30.0-30.9,adult      BPH (benign prostatic hypertrophy)      Cellulitis      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)      Coronary artery disease     triple bypass 1995     Diabetes mellitus (H)      Diabetic polyneuropathy (H)      History of blood transfusion      Hyperlipidaemia      Hypertension      Malignant neoplasm (H)     CLL     Noninflammatory pericardial effusion      Osteomyelitis of left foot (H)      PVD (peripheral vascular disease) (H)      Sebaceous cyst        Past Surgical History:   Procedure Laterality Date     AMPUTATE TOE(S)  1/10/2014    Procedure: AMPUTATE TOE(S);;  Surgeon: Amador Vick MD;  Location: [facility]     BONE MARROW BIOPSY, BONE SPECIMEN, NEEDLE/TROCAR  10/19/2012    Procedure: BIOPSY BONE MARROW;  BONE MARROW BIOPSY  (CONSCIOUS SED);  Surgeon: Ramon Quesada  MD Oj;  Location:  GI     BYPASS GRAFT FEMOROPOPLITEAL  1/10/2014    Procedure: BYPASS GRAFT FEMOROPOPLITEAL;  Left above knee popliteal to  Below knee popliteal bypass using transverse saphenous vein graft. Left 2nd Toe amputation;  Surgeon: Amador Vick MD;  Location:  OR     CARDIAC SURGERY      angioplasty with stent, triple bypass     EXCISE CYST GENERIC (LOCATION) N/A 2/1/2016    Procedure: EXCISE CYST GENERIC (LOCATION);  Surgeon: Amador Vick MD;  [facility]     GRAFT VEIN FROM EXTREMITY (LOCATION)  1/10/2014    Procedure: GRAFT VEIN FROM EXTREMITY (LOCATION);;  Surgeon: Amador Vick MD;  Location:  OR     LAMINECTOMY THORACIC ONE LEVEL N/A 2/8/2017    Procedure: LAMINECTOMY THORACIC ONE LEVEL;  Surgeon: Marcelo Trent MD;  [facility]     OPTICAL TRACKING SYSTEM FUSION POSTERIOR SPINE THORACIC THREE+ LEVELS N/A 2/12/2017    Procedure: OPTICAL TRACKING SYSTEM FUSION POSTERIOR SPINE THORACIC THREE+ LEVELS;  Surgeon: Marcelo Trent MD;  [facility]     TONSILLECTOMY       VASCULAR SURGERY      ptcr legs       Family History   Problem Relation Age of Onset     CANCER Mother      leukemia       Social History   Substance Use Topics     Smoking status: Former Smoker     Packs/day: 2.00     Years: 30.00     Types: Cigarettes     Quit date: 6/19/1995     Smokeless tobacco: Never Used     Alcohol use Yes      Comment: 1 drink monthly       Allergies   Allergen Reactions     Blood Transfusion Related (Informational Only) Other (See Comments)     Patient has a history of a clinically significant antibody against RBC antigens.  A delay in compatible RBCs may occur.          ROS:   CONSTITUTIONAL:No report of fever, chills,  or change in weight  RESPIRATORY: No cough, wheezing, SOB, or hemoptysis  CARDIOVASCULAR: see HPI  MUSCULO-SKELETAL: No joint pain/swelling, no muslce pain  NEURO: No paresthesias, syncope, pre-syncope, light headness, dizziness or  vertigo  ENDOCRINE: No temperature intolerance, no skin/hair changes  PSYCHIATRIC: No change in mood, feeling down/anxious, no change in sleep or appetite  GI: no melena or hematochezia, no change in bowel habits  : no hematuria or dysurea, no hesitancy, dribbling or incontinence  HEME: no easy bruising or bleeding, no history of anemia, no history of blood clots  SKIN: no rashes or sores, no unusual itching    Physical Examination:  Vitals: /64 (BP Location: Left arm, Patient Position: Chair, Cuff Size: Adult Regular)  Pulse 72  Ht 1.829 m (6')  Wt 85.2 kg (187 lb 14.4 oz)  SpO2 93%  BMI 25.48 kg/m2  BMI= Body mass index is 25.48 kg/(m^2).    Weight is increasing     In general, the patient is a pleasant male in no apparent distress.      HEENT: NC/AT.  PERRLA.  EOMI.  Sclerae white, not injected.    Neck: Carotids 2+ bilaterally without bruits.  No jugular venous distension. No thyromegaly.   Heart: RRR. Normal S1, S2. No murmur, rub, click, or gallop. There is no heave.    Lungs: Clear bilaterally.  No rhonchi, wheezes, rales.   Abdomen: Soft, nontender, nondistended.   Extremities: No edema.  The pulses are 2+at the radial and DP bilaterally.  Neuro: grossly non focal, gait normal.  Skin: no rashes.    Laboratory:    Chemistry panel:   Recent Labs   Lab Test  10/30/17   0857  09/18/17   1004   04/08/17   0927  04/08/17   0014   04/06/17   0537  03/17/17   0544   NA  140  136   < >  144   --    < >  144  145*   POTASSIUM  4.4  4.6   < >  3.5  3.2*   < >  3.6  3.7   CHLORIDE  107  102   < >  106   --    < >  108  111*   CO2  24  27   < >  29   --    < >  25  26   ANIONGAP  9  7   < >  9   --    < >  10  8   GLC  153*  161*   < >  119*   --    < >  120*  128*   BUN  30  27   < >  9   --    < >  10  16   CR  1.09  1.21   < >  0.63*   --    < >  0.63*  0.57*   JUSTINO  9.0  9.5   < >  8.1*   --    < >  8.0*  8.6   MAG   --    --    --   1.4*  1.4*   --    --    --    GFRESTIMATED  66  58*   < >  >90  Non   GFR Calc     --    < >  >90  Non  GFR Calc    >90  Non  GFR Calc     AST   --    --    --    --    --    --   10  13   ALT   --    --    --    --    --    --   17  25    < > = values in this interval not displayed.       CBC:   Recent Labs   Lab Test  05/25/17   1124  04/13/17   1214   WBC  6.7  8.3   RBC  3.97*  3.86*   HGB  11.0*  10.3*   HCT  36.5*  35.0*   MCV  92  91   MCH  27.7  26.7   MCHC  30.1*  29.4*   RDW  19.0*  18.3*   PLT  152  220       Lipid Panel:  Recent Labs   Lab Test  05/23/14   0745  01/10/14   0923   CHOL  134  92   HDL  34*  28*   LDL  48  45   TRIG  257*  92   CHOLHDLRATIO  3.9  3.3       Thyroid:   No results found for: TSH  No results found for: T4  Hemoglobin A1C   Date Value Ref Range Status   02/10/2017 6.1 (H) 4.3 - 6.0 % Final     INR   Date Value Ref Range Status   02/12/2017 1.22 (H) 0.86 - 1.14 Final         Cardiac data:  ECG today shows AFib with diffuse TW changes        EKG 04/06/17:    Rate approx 125 bpm, NSR, LAD, no ischemic changes.     Echo (2/24/2017)  Technically difficult study.The patient's rhythm is atrial fibrillation.  The left ventricle is severely dilated. Left ventricular systolic function is  severely reduced, ejection fraction is estimated at 25-30%. There is severe  global hypokinesis.  Right ventricular systolic function is mildly reduced.  The right ventricular systolic pressure is approximated at 15 mmHg plus the  right atrial pressure. IVC is plethoric and estimated mean RA pressure is 15  mmHg.  No pericardial effusion present.      Assessment and recommendations:  Mr. Tad Manning is a 76-year old male with    1. HFrEF, stage C, NYHA 3  2. CAD, S/p CAB several years ago  3. Atrial fibrialltion, chronic  4. Essential hypertension  5. Dyslipidemia  6. DM Type II  7. PAD with LE stents and bypass    From a heart failure standpoint, Mr. Manning appears euvolemic on exam but his weight is up. He agrees to  track this at home and contact our clinic with weight gain of more than 2 lbs/day. The severe LV dysfunction and diffuse TW changes does warrant an ischemia work-up but he is asymptomatic from a cardiac standpoint without angina or worsening dyspnea. He wishes to avoid any invasive testing at this point and continue medical therapy. He agrees to repeat assessment in 3 months and if LVEF remains low then we will proceed with an angiogram at that point. His current regimen includes ASA 81 mg/d, Plavix 75 mg/d, Atorvastatin 40 mg q24h, lisinopril 15 mg q24h, metoprolol 200 mg/d, spironolactone 12.5 mg/d and lasix 20 mg/d. Patient will clarify the doses of lisinopril and metoprolol. His lipids should be reassessed with his next blood draw.    RTC 3 mos with Dr. Valdes with limited echo    Case was discussed with Dr. Lucio Case MD  Cardiology Fellow  [phone]      ATTENDING ATTESTATION:  This patient has been seen and examined by me October 30, 2017 with Dr. Case. I have reviewed the vitals, laboratory and imaging data relevant to this patient's care. I have edited this note to reflect our joint assessment and plan, and discussed the plan with the patient.    Clarisse Valdes MD, MS  Staff Cardiologist  Pager: [phone]

## 2017-10-30 NOTE — PROGRESS NOTES
Chief Complaint: Evaluation of HFrEF/ICM    HPI:   Mr. Tad Manning is a 76-year old male with a past medical history of CAD c/b ICM, atrial flutter, earlier in the year admitted for discitis and multple admission for decompensated ICM/HFrEF who presented for follow-up of cardiomyopathy. He was noted to have a depressed LVEF of 25-30%. He had previously undergone a triple vessel CAB in 1995 and has hypertension, diabetes and dyslipidemia.     After his stay at the TCU, he is home now and recuperating well. He is without specific cardiac complaints. He is on a good medical regimen that he is compliant with. His weight is up and his appetite is much improved.    ROS otherwise negative    Current Outpatient Prescriptions   Medication Sig Dispense Refill     aspirin 81 MG tablet Take 1 tablet (81 mg) by mouth daily       ALPRAZolam (XANAX) 0.25 MG tablet TK 1 T PO QHS PRN  1     lisinopril (PRINIVIL/ZESTRIL) 10 MG tablet TK 1 T PO D  1     furosemide (LASIX) 20 MG tablet Take 1 tablet (20 mg) by mouth daily 90 tablet 3     lisinopril (PRINIVIL/ZESTRIL) 5 MG tablet Take 1 tablet (5 mg) by mouth daily 90 tablet 3     acetaminophen (TYLENOL) 325 MG tablet Take 325 mg by mouth every 4 hours as needed for mild pain       metoprolol (TOPROL-XL) 200 MG 24 hr tablet Take 1 tablet (200 mg) by mouth daily 90 tablet 3     Citalopram Hydrobromide (CELEXA PO) Take 10 mg by mouth daily       spironolactone (ALDACTONE) 25 MG tablet Take 0.5 tablets (12.5 mg) by mouth daily 45 tablet 3     nystatin (MYCOSTATIN) cream Apply topically 2 times daily       atorvastatin (LIPITOR) 40 MG tablet Take 1 tablet (40 mg) by mouth daily 30 tablet 1     clopidogrel (PLAVIX) 75 MG tablet Take 1 tablet (75 mg) by mouth daily 30 tablet      metFORMIN (GLUCOPHAGE) 500 MG tablet Take 1 tablet (500 mg) by mouth 2 times daily (with meals) 60 tablet      cholecalciferol 1000 UNITS TABS Take 1,000 Units by mouth daily 30 tablet      tamsulosin  (FLOMAX) 0.4 MG capsule Take 2 capsules (0.8 mg) by mouth At Bedtime 60 capsule      nitroglycerin (NITROSTAT) 0.4 MG sublingual tablet For chest pain place 1 tablet under the tongue every 5 minutes for 3 doses. If symptoms persist 5 minutes after 1st dose call 911. 84 tablet      order for DME Equipment being ordered: DH2 shoe 1 Device 0     ranitidine (ZANTAC) 150 MG tablet Take 1 tablet (150 mg) by mouth 2 times daily (Patient not taking: Reported on 9/18/2017) 60 tablet      benzonatate (TESSALON) 100 MG capsule Take 1 capsule (100 mg) by mouth 3 times daily as needed for cough (Patient not taking: Reported on 9/18/2017) 42 capsule        Past Medical History:   Diagnosis Date     Arthritis      ASCVD (arteriosclerotic cardiovascular disease)      BMI 30.0-30.9,adult      BPH (benign prostatic hypertrophy)      Cellulitis      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)      Coronary artery disease     triple bypass 1995     Diabetes mellitus (H)      Diabetic polyneuropathy (H)      History of blood transfusion      Hyperlipidaemia      Hypertension      Malignant neoplasm (H)     CLL     Noninflammatory pericardial effusion      Osteomyelitis of left foot (H)      PVD (peripheral vascular disease) (H)      Sebaceous cyst        Past Surgical History:   Procedure Laterality Date     AMPUTATE TOE(S)  1/10/2014    Procedure: AMPUTATE TOE(S);;  Surgeon: Amador Vick MD;  Location:  OR     BONE MARROW BIOPSY, BONE SPECIMEN, NEEDLE/TROCAR  10/19/2012    Procedure: BIOPSY BONE MARROW;  BONE MARROW BIOPSY  (CONSCIOUS SED);  Surgeon: Ramon Quesada MD;  Location:  GI     BYPASS GRAFT FEMOROPOPLITEAL  1/10/2014    Procedure: BYPASS GRAFT FEMOROPOPLITEAL;  Left above knee popliteal to  Below knee popliteal bypass using transverse saphenous vein graft. Left 2nd Toe amputation;  Surgeon: Amador Vick MD;  Location:  OR     CARDIAC SURGERY      angioplasty with  stent, triple bypass     EXCISE CYST GENERIC (LOCATION) N/A 2/1/2016    Procedure: EXCISE CYST GENERIC (LOCATION);  Surgeon: Amador Vick MD;  [facility]     GRAFT VEIN FROM EXTREMITY (LOCATION)  1/10/2014    Procedure: GRAFT VEIN FROM EXTREMITY (LOCATION);;  Surgeon: Amador Vick MD;  Location: [facility]     LAMINECTOMY THORACIC ONE LEVEL N/A 2/8/2017    Procedure: LAMINECTOMY THORACIC ONE LEVEL;  Surgeon: Marcelo Trent MD;  [facility]     OPTICAL TRACKING SYSTEM FUSION POSTERIOR SPINE THORACIC THREE+ LEVELS N/A 2/12/2017    Procedure: OPTICAL TRACKING SYSTEM FUSION POSTERIOR SPINE THORACIC THREE+ LEVELS;  Surgeon: Marcelo Trent MD;  Location: [facility]     TONSILLECTOMY       VASCULAR SURGERY      ptcr legs       Family History   Problem Relation Age of Onset     CANCER Mother      leukemia       Social History   Substance Use Topics     Smoking status: Former Smoker     Packs/day: 2.00     Years: 30.00     Types: Cigarettes     Quit date: 6/19/1995     Smokeless tobacco: Never Used     Alcohol use Yes      Comment: 1 drink monthly       Allergies   Allergen Reactions     Blood Transfusion Related (Informational Only) Other (See Comments)     Patient has a history of a clinically significant antibody against RBC antigens.  A delay in compatible RBCs may occur.          ROS:   CONSTITUTIONAL:No report of fever, chills,  or change in weight  RESPIRATORY: No cough, wheezing, SOB, or hemoptysis  CARDIOVASCULAR: see HPI  MUSCULO-SKELETAL: No joint pain/swelling, no muslce pain  NEURO: No paresthesias, syncope, pre-syncope, light headness, dizziness or vertigo  ENDOCRINE: No temperature intolerance, no skin/hair changes  PSYCHIATRIC: No change in mood, feeling down/anxious, no change in sleep or appetite  GI: no melena or hematochezia, no change in bowel habits  : no hematuria or dysurea, no hesitancy, dribbling or incontinence  HEME: no easy bruising or bleeding, no history of anemia, no  history of blood clots  SKIN: no rashes or sores, no unusual itching    Physical Examination:  Vitals: /64 (BP Location: Left arm, Patient Position: Chair, Cuff Size: Adult Regular)  Pulse 72  Ht 1.829 m (6')  Wt 85.2 kg (187 lb 14.4 oz)  SpO2 93%  BMI 25.48 kg/m2  BMI= Body mass index is 25.48 kg/(m^2).    Weight is increasing     In general, the patient is a pleasant male in no apparent distress.      HEENT: NC/AT.  PERRLA.  EOMI.  Sclerae white, not injected.    Neck: Carotids 2+ bilaterally without bruits.  No jugular venous distension. No thyromegaly.   Heart: RRR. Normal S1, S2. No murmur, rub, click, or gallop. There is no heave.    Lungs: Clear bilaterally.  No rhonchi, wheezes, rales.   Abdomen: Soft, nontender, nondistended.   Extremities: No edema.  The pulses are 2+at the radial and DP bilaterally.  Neuro: grossly non focal, gait normal.  Skin: no rashes.    Laboratory:    Chemistry panel:   Recent Labs   Lab Test  10/30/17   0857  09/18/17   1004   04/08/17   0927  04/08/17   0014   04/06/17   0537  03/17/17   0544   NA  140  136   < >  144   --    < >  144  145*   POTASSIUM  4.4  4.6   < >  3.5  3.2*   < >  3.6  3.7   CHLORIDE  107  102   < >  106   --    < >  108  111*   CO2  24  27   < >  29   --    < >  25  26   ANIONGAP  9  7   < >  9   --    < >  10  8   GLC  153*  161*   < >  119*   --    < >  120*  128*   BUN  30  27   < >  9   --    < >  10  16   CR  1.09  1.21   < >  0.63*   --    < >  0.63*  0.57*   JUSTINO  9.0  9.5   < >  8.1*   --    < >  8.0*  8.6   MAG   --    --    --   1.4*  1.4*   --    --    --    GFRESTIMATED  66  58*   < >  >90  Non  GFR Calc     --    < >  >90  Non  GFR Calc    >90  Non  GFR Calc     AST   --    --    --    --    --    --   10  13   ALT   --    --    --    --    --    --   17  25    < > = values in this interval not displayed.       CBC:   Recent Labs   Lab Test  05/25/17   1124  04/13/17   1214   WBC  6.7   8.3   RBC  3.97*  3.86*   HGB  11.0*  10.3*   HCT  36.5*  35.0*   MCV  92  91   MCH  27.7  26.7   MCHC  30.1*  29.4*   RDW  19.0*  18.3*   PLT  152  220       Lipid Panel:  Recent Labs   Lab Test  05/23/14   0745  01/10/14   0923   CHOL  134  92   HDL  34*  28*   LDL  48  45   TRIG  257*  92   CHOLHDLRATIO  3.9  3.3       Thyroid:   No results found for: TSH  No results found for: T4  Hemoglobin A1C   Date Value Ref Range Status   02/10/2017 6.1 (H) 4.3 - 6.0 % Final     INR   Date Value Ref Range Status   02/12/2017 1.22 (H) 0.86 - 1.14 Final         Cardiac data:  ECG today shows AFib with diffuse TW changes        EKG 04/06/17:    Rate approx 125 bpm, NSR, LAD, no ischemic changes.     Echo (2/24/2017)  Technically difficult study.The patient's rhythm is atrial fibrillation.  The left ventricle is severely dilated. Left ventricular systolic function is  severely reduced, ejection fraction is estimated at 25-30%. There is severe  global hypokinesis.  Right ventricular systolic function is mildly reduced.  The right ventricular systolic pressure is approximated at 15 mmHg plus the  right atrial pressure. IVC is plethoric and estimated mean RA pressure is 15  mmHg.  No pericardial effusion present.      Assessment and recommendations:  Mr. Tad Manning is a 76-year old male with    1. HFrEF, stage C, NYHA 3  2. CAD, S/p CAB several years ago  3. Atrial fibrialltion, chronic  4. Essential hypertension  5. Dyslipidemia  6. DM Type II  7. PAD with LE stents and bypass    From a heart failure standpoint, Mr. Manning appears euvolemic on exam but his weight is up. He agrees to track this at home and contact our clinic with weight gain of more than 2 lbs/day. The severe LV dysfunction and diffuse TW changes does warrant an ischemia work-up but he is asymptomatic from a cardiac standpoint without angina or worsening dyspnea. He wishes to avoid any invasive testing at this point and continue medical therapy. He agrees  to repeat assessment in 3 months and if LVEF remains low then we will proceed with an angiogram at that point. His current regimen includes ASA 81 mg/d, Plavix 75 mg/d, Atorvastatin 40 mg q24h, lisinopril 15 mg q24h, metoprolol 200 mg/d, spironolactone 12.5 mg/d and lasix 20 mg/d. Patient will clarify the doses of lisinopril and metoprolol. His lipids should be reassessed with his next blood draw.    RTC 3 mos with Dr. Valdes with limited echo    Case was discussed with Dr. Lucio Case MD  Cardiology Fellow  [phone]      ATTENDING ATTESTATION:  This patient has been seen and examined by me October 30, 2017 with Dr. Case. I have reviewed the vitals, laboratory and imaging data relevant to this patient's care. I have edited this note to reflect our joint assessment and plan, and discussed the plan with the patient.    Clarisse Valdes MD, MS  Staff Cardiologist  Pager: [phone]

## 2017-10-30 NOTE — NURSING NOTE
Chief Complaint   Patient presents with     Follow Up For     manage heart failure and AF/labs/EKG/last visit May 2017     Vitals were taken and medications were reconciled. EKG was performed.    MOE Garcia  9:25 AM

## 2017-10-30 NOTE — MR AVS SNAPSHOT
After Visit Summary   10/30/2017    Tad Butler    MRN: [id number]           Patient Information     Date Of Birth          1940        Visit Information        Provider Department      10/30/2017 9:00 AM Clarisse Valdes MD [facility]        Today's Diagnoses     Chronic systolic congestive heart failure (H)    -  1    Acute midline low back pain, with sciatica presence unspecified          Care Instructions    Patient Instructions:  It was a pleasure to see you in the cardiology clinic today.      If you have any questions, you can reach my nurse, Jeaneth Montero, at [phone].  Press Option #1 for the [facility], and then press Option #3 for nursing.  We are encouraging the use of [employer] to communicate with your HealthCare Provider    Note the new medications: none  Stop the following medications: none    Follow the American Heart Association Diet and Lifestyle recommendations:  Limit saturated fat, trans fat, sodium, red meat, sweets and sugar-sweetened beverages. If you choose to eat red meat, compare labels and select the leanest cuts available.  Aim for at least 150 minutes of moderate physical activity or 75 minutes of vigorous physical activity - or an equal combination of both - each week.    The results from today include: Results for TAD BUTLER (MRN [id number]) as of 10/30/2017 10:45   Ref. Range 10/30/2017 08:57   Sodium Latest Ref Range: 133 - 144 mmol/L 140   Potassium Latest Ref Range: 3.4 - 5.3 mmol/L 4.4   Chloride Latest Ref Range: 94 - 109 mmol/L 107   Carbon Dioxide Latest Ref Range: 20 - 32 mmol/L 24   Urea Nitrogen Latest Ref Range: 7 - 30 mg/dL 30   Creatinine Latest Ref Range: 0.66 - 1.25 mg/dL 1.09   GFR Estimate Latest Ref Range: >60 mL/min/1.7m2 66   GFR Estimate If Black Latest Ref Range: >60 mL/min/1.7m2 80   Calcium Latest Ref Range: 8.5 - 10.1 mg/dL 9.0   Anion Gap Latest Ref Range: 3 - 14 mmol/L 9   Glucose  Latest Ref Range: 70 - 99 mg/dL 153 (H)     Please follow up with Dr. Clarisse Valdes with a limited ECHO in three months.    Sincerely,    Clarisse Valdes MD     If you have an urgent need after hours (8:00 am to 4:30 pm) please call [phone] and ask for the cardiology fellow on call.                    Follow-ups after your visit        Follow-up notes from your care team     Return in about 3 months (around 1/30/2018), or if symptoms worsen or fail to improve, for Lab Work.      Your next 10 appointments already scheduled     Nov 02, 2017 10:20 AM CDT   JULIANNA Spine with Mariano Doe, PT   [facility] ([facility]  )    [address] [place] [id number] [phone]            Feb 05, 2018 11:00 AM CST   Lab with  LAB   [facility] Lab ([facility])    [address] [place] [id number] [phone]            Feb 05, 2018 11:30 AM CST   Ech Limited with [facility] Echo ([facility])    [address] [place] [id number] [phone]           1.  Please bring or wear a comfortable two-piece outfit. 2.  You may eat, drink and take your normal medicines. 3.  For any questions that cannot be answered, please contact the ordering physician            Feb 05, 2018  1:00 PM CST   (Arrive by 12:45 PM)   Return Visit with Clarisse Valdes MD   [facility] Heart Care ([facility])    [address] [place] [id number] [phone]            Apr 16, 2018 10:00 AM CDT   (Arrive by 9:45 AM)   CT THORACIC SPINE W/O CONTRAST with UCCT1   [facility] [place] [facility] ([facility])    [address] [place] [id number] [phone]           Please bring any scans or X-rays taken at other hospitals, if similar tests were done. Also bring a list of your medicines, including vitamins, minerals and  over-the-counter drugs. It is safest to leave personal items at home.  Be sure to tell your doctor:   If you have any allergies.   If there s any chance you are pregnant.   If you are breastfeeding.   If you have any special needs.  You do not need to do anything special to prepare.  Please wear loose clothing, such as a sweat suit or jogging clothes. Avoid snaps, zippers and other metal. We may ask you to undress and put on a hospital gown.            Apr 16, 2018 10:30 AM CDT   XR THORACIC/LUMBAR STANDING 2 VIEWS (SCOLI) with UCXR1   [facility] [place] [facility] ([facility] [place])    [address]  1st Floor  [facility] [address] [phone]           Please bring a list of your current medicines to your exam. (Include vitamins, minerals and over-thecounter medicines.) Leave your valuables at home.  Tell your doctor if there is a chance you may be pregnant.  You do not need to do anything special for this exam.            Apr 16, 2018 11:15 AM CDT   (Arrive by 11:00 AM)   Return Visit with Marcelo Trent MD   [facility] Neurosurgery ([facility] [place])    [address]  3rd Floor  [facility] [address] [phone]              Future tests that were ordered for you today     Open Future Orders        Priority Expected Expires Ordered    Echocardiogram Limited Routine 3/30/2018 10/30/2018 10/30/2017            Who to contact     If you have questions or need follow up information about today's clinic visit or your schedule please contact [facility] directly at [phone].  Normal or non-critical lab and imaging results will be communicated to you by MyChart, letter or phone within 4 business days after the clinic has received the results. If you do not hear from us within 7 days, please contact the clinic through MyChart or phone. If you have a critical or abnormal lab result, we will notify you by phone as soon as possible.  Submit  refill requests through [employer] or call your pharmacy and they will forward the refill request to us. Please allow 3 business days for your refill to be completed.          Additional Information About Your Visit        [employer] Information     [employer] gives you secure access to your electronic health record. If you see a primary care provider, you can also send messages to your care team and make appointments. If you have questions, please call your primary care clinic.  If you do not have a primary care provider, please call [phone] and they will assist you.        Care EveryWhere ID     This is your Care EveryWhere ID. This could be used by other organizations to access your [facility] medical records  [id number]        Your Vitals Were     Pulse Height Pulse Oximetry BMI (Body Mass Index)          72 1.829 m (6') 93% 25.48 kg/m2         Blood Pressure from Last 3 Encounters:   10/30/17 110/64   09/18/17 93/57   09/18/17 98/55    Weight from Last 3 Encounters:   10/30/17 85.2 kg (187 lb 14.4 oz)   09/18/17 82.1 kg (181 lb)   08/14/17 83.1 kg (183 lb 4.8 oz)              We Performed the Following     EKG 12-lead, tracing only (Same Day)          Today's Medication Changes          These changes are accurate as of: 10/30/17 10:58 AM.  If you have any questions, ask your nurse or doctor.               These medicines have changed or have updated prescriptions.        Dose/Directions    aspirin 81 MG tablet   This may have changed:    - medication strength  - how much to take   Used for:  Acute midline low back pain, with sciatica presence unspecified   Changed by:  Clarisse Valdes MD        Dose:  81 mg   Take 1 tablet (81 mg) by mouth daily   Refills:  0                Primary Care Provider Office Phone # Fax #    Gene Guevara [phone]       [address] [address]        Equal Access to Services     STUART ELLISON AH: Yumiko Urena  waameenada jerichoessence, qaybta karosalva fonseca, deandre barronrobert ah. So [facility] [phone].    ATENCIÓN: Si fan santillan, tiene a noe disposición servicios gratuitos de asistencia lingüística. Jeoy al [phone].    We comply with applicable federal civil rights laws and [place] laws. We do not discriminate on the basis of race, color, national origin, age, disability, sex, sexual orientation, or gender identity.            Thank you!     Thank you for choosing [facility]  for your care. Our goal is always to provide you with excellent care. Hearing back from our patients is one way we can continue to improve our services. Please take a few minutes to complete the written survey that you may receive in the mail after your visit with us. Thank you!             Your Updated Medication List - Protect others around you: Learn how to safely use, store and throw away your medicines at www.disposemymeds.org.          This list is accurate as of: 10/30/17 10:58 AM.  Always use your most recent med list.                   Brand Name Dispense Instructions for use Diagnosis    acetaminophen 325 MG tablet    TYLENOL     Take 325 mg by mouth every 4 hours as needed for mild pain        ALPRAZolam 0.25 MG tablet    XANAX     TK 1 T PO QHS PRN    Epidural abscess       aspirin 81 MG tablet      Take 1 tablet (81 mg) by mouth daily    Acute midline low back pain, with sciatica presence unspecified       atorvastatin 40 MG tablet    LIPITOR    30 tablet    Take 1 tablet (40 mg) by mouth daily    Claudication of left lower extremity (H)       benzonatate 100 MG capsule    TESSALON    42 capsule    Take 1 capsule (100 mg) by mouth 3 times daily as needed for cough    Acute on chronic systolic congestive heart failure (H)       CELEXA PO      Take 10 mg by mouth daily        cholecalciferol 1000 UNITS Tabs     30 tablet    Take 1,000 Units by mouth daily    Loop diuretic causing adverse effect in  therapeutic use, subsequent encounter       clopidogrel 75 MG tablet    PLAVIX    30 tablet    Take 1 tablet (75 mg) by mouth daily    Atrial flutter, unspecified type (H)       furosemide 20 MG tablet    LASIX    90 tablet    Take 1 tablet (20 mg) by mouth daily    Chronic systolic heart failure (H)       * lisinopril 10 MG tablet    PRINIVIL/ZESTRIL     TK 1 T PO D    Epidural abscess       * lisinopril 5 MG tablet    PRINIVIL/ZESTRIL    90 tablet    Take 1 tablet (5 mg) by mouth daily    Essential hypertension, Chronic diastolic heart failure (H)       metFORMIN 500 MG tablet    GLUCOPHAGE    60 tablet    Take 1 tablet (500 mg) by mouth 2 times daily (with meals)    Type 2 diabetes mellitus with diabetic polyneuropathy, without long-term current use of insulin (H)       metoprolol 200 MG 24 hr tablet    TOPROL-XL    90 tablet    Take 1 tablet (200 mg) by mouth daily    Chronic systolic heart failure (H)       nitroGLYcerin 0.4 MG sublingual tablet    NITROSTAT    25 tablet    For chest pain place 1 tablet under the tongue every 5 minutes for 3 doses. If symptoms persist 5 minutes after 1st dose call 911.    Atrial flutter, unspecified type (H)       nystatin cream    MYCOSTATIN     Apply topically 2 times daily        order for DME     1 Device    Equipment being ordered: 2 shoe    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), Diabetic ulcer of left foot due to type 2 diabetes mellitus (H)       ranitidine 150 MG tablet    ZANTAC    60 tablet    Take 1 tablet (150 mg) by mouth 2 times daily    Gastroesophageal reflux disease without esophagitis       spironolactone 25 MG tablet    ALDACTONE    45 tablet    Take 0.5 tablets (12.5 mg) by mouth daily    Chronic systolic congestive heart failure (H)       tamsulosin 0.4 MG capsule    FLOMAX    60 capsule    Take 2 capsules (0.8 mg) by mouth At Bedtime    Renal insufficiency       * Notice:  This list has 2 medication(s) that are the same as other  medications prescribed for you. Read the directions carefully, and ask your doctor or other care provider to review them with you.

## 2017-10-30 NOTE — NURSING NOTE
Cardiac Testing: Patient given instructions regarding  echocardiogram . Discussed purpose, preparation, procedure and when to expect results reported back to the patient. Patient demonstrated understanding of this information and agreed to call with further questions or concerns.  Diet: Patient instructed regarding a heart healthy diet, including discussion of reduced fat and sodium intake. Patient demonstrated understanding of this information and agreed to call with further questions or concerns.  Labs: Patient was given results of the laboratory testing obtained today. Patient was instructed to return for the next laboratory testing in three months . Patient demonstrated understanding of this information and agreed to call with further questions or concerns.   Med Reconcile: Reviewed and verified all current medications with the patient. The updated medication list was printed and given to the patient.  Return Appointment: Patient given instructions regarding scheduling next clinic visit. Patient demonstrated understanding of this information and agreed to call with further questions or concerns.  Patient stated he understood all health information given and agreed to call with further questions or concerns.

## 2017-10-30 NOTE — PATIENT INSTRUCTIONS
Patient Instructions:  It was a pleasure to see you in the cardiology clinic today.      If you have any questions, you can reach my nurse, Jeaneth Montero, at [phone].  Press Option #1 for the [facility], and then press Option #3 for nursing.  We are encouraging the use of [employer] to communicate with your HealthCare Provider    Note the new medications: none  Stop the following medications: none    Follow the American Heart Association Diet and Lifestyle recommendations:  Limit saturated fat, trans fat, sodium, red meat, sweets and sugar-sweetened beverages. If you choose to eat red meat, compare labels and select the leanest cuts available.  Aim for at least 150 minutes of moderate physical activity or 75 minutes of vigorous physical activity - or an equal combination of both - each week.    The results from today include: Results for PETER BUTLER (MRN [id number]) as of 10/30/2017 10:45   Ref. Range 10/30/2017 08:57   Sodium Latest Ref Range: 133 - 144 mmol/L 140   Potassium Latest Ref Range: 3.4 - 5.3 mmol/L 4.4   Chloride Latest Ref Range: 94 - 109 mmol/L 107   Carbon Dioxide Latest Ref Range: 20 - 32 mmol/L 24   Urea Nitrogen Latest Ref Range: 7 - 30 mg/dL 30   Creatinine Latest Ref Range: 0.66 - 1.25 mg/dL 1.09   GFR Estimate Latest Ref Range: >60 mL/min/1.7m2 66   GFR Estimate If Black Latest Ref Range: >60 mL/min/1.7m2 80   Calcium Latest Ref Range: 8.5 - 10.1 mg/dL 9.0   Anion Gap Latest Ref Range: 3 - 14 mmol/L 9   Glucose Latest Ref Range: 70 - 99 mg/dL 153 (H)     Please follow up with Dr. Clarisse Valdes with a limited ECHO in three months.    Sincerely,    Clarisse Valdes MD     If you have an urgent need after hours (8:00 am to 4:30 pm) please call [phone] and ask for the cardiology fellow on call.

## 2017-11-01 LAB — INTERPRETATION ECG - MUSE: NORMAL

## 2017-11-02 ENCOUNTER — THERAPY VISIT (OUTPATIENT)
Dept: PHYSICAL THERAPY | Facility: CLINIC | Age: 77
End: 2017-11-02
Payer: MEDICARE

## 2017-11-02 DIAGNOSIS — M62.81 GENERALIZED MUSCLE WEAKNESS: ICD-10-CM

## 2017-11-02 PROCEDURE — 97112 NEUROMUSCULAR REEDUCATION: CPT | Mod: GP | Performed by: PHYSICAL THERAPIST

## 2017-11-02 PROCEDURE — 97110 THERAPEUTIC EXERCISES: CPT | Mod: GP | Performed by: PHYSICAL THERAPIST

## 2017-11-09 ENCOUNTER — THERAPY VISIT (OUTPATIENT)
Dept: PHYSICAL THERAPY | Facility: CLINIC | Age: 77
End: 2017-11-09
Payer: MEDICARE

## 2017-11-09 DIAGNOSIS — M62.81 GENERALIZED MUSCLE WEAKNESS: ICD-10-CM

## 2017-11-09 PROCEDURE — 97112 NEUROMUSCULAR REEDUCATION: CPT | Mod: GP | Performed by: PHYSICAL THERAPIST

## 2017-11-09 PROCEDURE — 97110 THERAPEUTIC EXERCISES: CPT | Mod: GP | Performed by: PHYSICAL THERAPIST

## 2017-11-17 ENCOUNTER — THERAPY VISIT (OUTPATIENT)
Dept: PHYSICAL THERAPY | Facility: CLINIC | Age: 77
End: 2017-11-17
Payer: MEDICARE

## 2017-11-17 DIAGNOSIS — M62.81 GENERALIZED MUSCLE WEAKNESS: ICD-10-CM

## 2017-11-17 PROCEDURE — 97112 NEUROMUSCULAR REEDUCATION: CPT | Mod: GP | Performed by: PHYSICAL THERAPIST

## 2017-11-17 PROCEDURE — 97110 THERAPEUTIC EXERCISES: CPT | Mod: GP | Performed by: PHYSICAL THERAPIST

## 2017-12-01 ENCOUNTER — THERAPY VISIT (OUTPATIENT)
Dept: PHYSICAL THERAPY | Facility: CLINIC | Age: 77
End: 2017-12-01
Payer: MEDICARE

## 2017-12-01 DIAGNOSIS — M62.81 GENERALIZED MUSCLE WEAKNESS: ICD-10-CM

## 2017-12-01 PROCEDURE — 97110 THERAPEUTIC EXERCISES: CPT | Mod: GP | Performed by: PHYSICAL THERAPIST

## 2017-12-01 PROCEDURE — G8979 MOBILITY GOAL STATUS: HCPCS | Mod: GP | Performed by: PHYSICAL THERAPIST

## 2017-12-01 PROCEDURE — 97112 NEUROMUSCULAR REEDUCATION: CPT | Mod: GP | Performed by: PHYSICAL THERAPIST

## 2017-12-01 PROCEDURE — G8978 MOBILITY CURRENT STATUS: HCPCS | Mod: GP | Performed by: PHYSICAL THERAPIST

## 2017-12-01 NOTE — PROGRESS NOTES
Drain was exchanged yesterday, see Dr. Oden's phone call note from yesterday.    Tad Manning is a 76 year old male with CAD, DM with neuropathy, HTN, PVD with bilateral LE stents on ASA/plvx, Chronic lymphocytic leukemia in remission (last chemo in Sept 2015) who presents after a fall on 2/8 and R > L leg weakness and inability to ambulate. His story is inconsistent and somewhat tangential but he clearly articulates that 72 hours prior he was able to ambulation but now he cannot. 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 He denies loss of sensation, bowel or bladder dysfunction. He reports normal napoleon-anal sensation  WOC was consulted to evaluate and treat a coccyx pressure injury. Patient was assessed and he had a sacral mepilex in placed. He complained of a tender feeling on his buttock. The coccyx and sacral area noted with intact skin , no blanchable erythema noted.   WOC advise patient to try frequent repositioning to help alleviate the tender feelings.  The Mepiex was left in placed.  WOC will sign of at this time. Please re consult if the need be.    Face to face time <15 minutes

## 2017-12-01 NOTE — PROGRESS NOTES
Subjective:    HPI                    Objective:    System    Physical Exam    General     ROS    Assessment/Plan:      PROGRESS  REPORT    Progress reporting period is from 10/2/17 to 12/1/17.     SUBJECTIVE  Subjective: Tad reports slow, steady improvement in overall strength. Continues to be limited by upper respiratory infection and fatigue. Has not been walking as much the past week, focusing more on strengthening exercises.    Current Pain level: 0/10   Initial Pain level: 0/10   Changes in function: Yes, see goal flow sheet for change in function   Adverse reactions: None;   ,         OBJECTIVE  Objective: 12 reps sit to stand today in 30 sec, very short of breath following 30 sec. Poor single leg balance remains. Able to walk 2-3 steps without walker, however reaches for table/wall for balance      ASSESSMENT/PLAN  Updated problem list and treatment plan: Diagnosis 1:  Generalized weakness, balance impairments  Pain -  hot/cold therapy, manual therapy, self management, education and home program  Decreased ROM/flexibility - manual therapy, therapeutic exercise, therapeutic activity and home program  Decreased strength - therapeutic exercise, therapeutic activities and home program  Impaired balance - neuro re-education, therapeutic activities and home program  Impaired gait - gait training, assistive devices and home program  Impaired muscle performance - neuro re-education and home program  Decreased function - therapeutic activities and home program  STG/LTGs have been met or progress has been made towards goals:  Yes (See Goal flow sheet completed today.)  Assessment of Progress: The patient's progress has slowed.  Self Management Plans:  Patient has been instructed in a home treatment program.  Patient is independent in a home treatment program.  Patient  has been instructed in self management of symptoms.  I have re-evaluated this patient and find that the nature, scope, duration and intensity of  the therapy is appropriate for the medical condition of the patient.  Tad continues to require the following intervention to meet STG and LTG's: PT      Recommendations:  This patient would benefit from continued therapy.     Frequency:  2 X a month, once daily  Duration:  for 1 months          Please refer to the daily flowsheet for treatment today, total treatment time and time spent performing 1:1 timed codes.

## 2017-12-15 ENCOUNTER — THERAPY VISIT (OUTPATIENT)
Dept: PHYSICAL THERAPY | Facility: CLINIC | Age: 77
End: 2017-12-15
Payer: MEDICARE

## 2017-12-15 DIAGNOSIS — M62.81 GENERALIZED MUSCLE WEAKNESS: ICD-10-CM

## 2017-12-15 PROCEDURE — 97112 NEUROMUSCULAR REEDUCATION: CPT | Mod: GP | Performed by: PHYSICAL THERAPIST

## 2017-12-15 PROCEDURE — 97110 THERAPEUTIC EXERCISES: CPT | Mod: GP | Performed by: PHYSICAL THERAPIST

## 2017-12-29 ENCOUNTER — THERAPY VISIT (OUTPATIENT)
Dept: PHYSICAL THERAPY | Facility: CLINIC | Age: 77
End: 2017-12-29
Payer: MEDICARE

## 2017-12-29 DIAGNOSIS — M62.81 GENERALIZED MUSCLE WEAKNESS: ICD-10-CM

## 2017-12-29 PROCEDURE — 97110 THERAPEUTIC EXERCISES: CPT | Mod: KX | Performed by: PHYSICAL THERAPIST

## 2017-12-29 PROCEDURE — G8980 MOBILITY D/C STATUS: HCPCS | Mod: GP | Performed by: PHYSICAL THERAPIST

## 2017-12-29 PROCEDURE — G8979 MOBILITY GOAL STATUS: HCPCS | Mod: GP | Performed by: PHYSICAL THERAPIST

## 2017-12-29 PROCEDURE — 97112 NEUROMUSCULAR REEDUCATION: CPT | Mod: GP | Performed by: PHYSICAL THERAPIST

## 2017-12-29 NOTE — PROGRESS NOTES
[facility] Initial Evaluation  Subjective:  HPI                    Objective:  System    Physical Exam    General     ROS    Assessment/Plan:    DISCHARGE REPORT    Progress reporting period is from 12/1/17 to 12/29/17.     SUBJECTIVE  Subjective: Tad continues to work hard on HEP, noticing slow improvement in overall strength and endurance. Discussed importance of continuing to progress HEP and to increase amount of walking in facility. Pt will continue with HEP independently at this time.    Current Pain level: 0/10   Initial Pain level: 0/10   Changes in function: No changes noted in function since last SOAP note   Adverse reactions: None;   ,         OBJECTIVE  Objective: LE strength: hip flexion 4+/5 bilaterally, hip abduction 4+/5 bilaterally, hip extension 4/5 bilaterally; tolerated 10 reps sit to stand today in 30 sec-continues to be very short of breath following 30 sec. Using walker for all mobility      ASSESSMENT/PLAN  Updated problem list and treatment plan: Diagnosis 1:  Generalized muscle weakness  Pain -  hot/cold therapy, manual therapy, self management, education and home program  Decreased ROM/flexibility - manual therapy, therapeutic exercise, therapeutic activity and home program  Decreased strength - therapeutic exercise, therapeutic activities and home program  Impaired balance - neuro re-education, therapeutic activities and home program  Impaired muscle performance - neuro re-education and home program  Decreased function - therapeutic activities and home program  STG/LTGs have been met or progress has been made towards goals:  None  Assessment of Progress: The patient's progress has plateaued.  Self Management Plans:  Patient has been instructed in a home treatment program.  Patient  has been instructed in self management of symptoms.  I have re-evaluated this patient and find that the nature, scope, duration and intensity of the therapy is appropriate for the  medical condition of the patient.  Tad continues to require the following intervention to meet STG and LTG's: PT intervention is no longer required to meet STG/LTG.  We will discharge this patient from PT.    Recommendations:  This patient is ready to be discharged from therapy and continue their home treatment program.    Please refer to the daily flowsheet for treatment today, total treatment time and time spent performing 1:1 timed codes.

## 2018-02-02 ENCOUNTER — PRE VISIT (OUTPATIENT)
Dept: CARDIOLOGY | Facility: CLINIC | Age: 78
End: 2018-02-02

## 2018-02-05 ENCOUNTER — RADIANT APPOINTMENT (OUTPATIENT)
Dept: CARDIOLOGY | Facility: CLINIC | Age: 78
End: 2018-02-05
Payer: MEDICARE

## 2018-02-05 ENCOUNTER — OFFICE VISIT (OUTPATIENT)
Dept: CARDIOLOGY | Facility: CLINIC | Age: 78
End: 2018-02-05
Attending: INTERNAL MEDICINE
Payer: MEDICARE

## 2018-02-05 ENCOUNTER — ANTICOAGULATION THERAPY VISIT (OUTPATIENT)
Dept: ANTICOAGULATION | Facility: CLINIC | Age: 78
End: 2018-02-05

## 2018-02-05 VITALS
SYSTOLIC BLOOD PRESSURE: 124 MMHG | HEART RATE: 75 BPM | WEIGHT: 215.4 LBS | OXYGEN SATURATION: 91 % | DIASTOLIC BLOOD PRESSURE: 74 MMHG | BODY MASS INDEX: 29.17 KG/M2 | HEIGHT: 72 IN

## 2018-02-05 DIAGNOSIS — Z79.01 LONG-TERM (CURRENT) USE OF ANTICOAGULANTS: ICD-10-CM

## 2018-02-05 DIAGNOSIS — I10 ESSENTIAL HYPERTENSION: ICD-10-CM

## 2018-02-05 DIAGNOSIS — I48.0 PAROXYSMAL ATRIAL FIBRILLATION (H): ICD-10-CM

## 2018-02-05 DIAGNOSIS — I50.22 CHRONIC SYSTOLIC CONGESTIVE HEART FAILURE (H): Primary | ICD-10-CM

## 2018-02-05 DIAGNOSIS — I50.22 CHRONIC SYSTOLIC CONGESTIVE HEART FAILURE (H): ICD-10-CM

## 2018-02-05 DIAGNOSIS — I48.91 ATRIAL FIBRILLATION (H): ICD-10-CM

## 2018-02-05 DIAGNOSIS — E78.2 MIXED HYPERLIPIDEMIA: ICD-10-CM

## 2018-02-05 LAB
ANION GAP SERPL CALCULATED.3IONS-SCNC: 9 MMOL/L (ref 3–14)
BUN SERPL-MCNC: 20 MG/DL (ref 7–30)
CALCIUM SERPL-MCNC: 8.8 MG/DL (ref 8.5–10.1)
CHLORIDE SERPL-SCNC: 105 MMOL/L (ref 94–109)
CO2 SERPL-SCNC: 24 MMOL/L (ref 20–32)
CREAT SERPL-MCNC: 1.05 MG/DL (ref 0.66–1.25)
GFR SERPL CREATININE-BSD FRML MDRD: 69 ML/MIN/1.7M2
GLUCOSE SERPL-MCNC: 167 MG/DL (ref 70–99)
POTASSIUM SERPL-SCNC: 4.2 MMOL/L (ref 3.4–5.3)
SODIUM SERPL-SCNC: 139 MMOL/L (ref 133–144)

## 2018-02-05 PROCEDURE — 36415 COLL VENOUS BLD VENIPUNCTURE: CPT | Performed by: INTERNAL MEDICINE

## 2018-02-05 PROCEDURE — G0463 HOSPITAL OUTPT CLINIC VISIT: HCPCS | Mod: 25,ZF

## 2018-02-05 PROCEDURE — 80048 BASIC METABOLIC PNL TOTAL CA: CPT | Performed by: INTERNAL MEDICINE

## 2018-02-05 PROCEDURE — 93005 ELECTROCARDIOGRAM TRACING: CPT | Mod: ZF

## 2018-02-05 PROCEDURE — 99215 OFFICE O/P EST HI 40 MIN: CPT | Mod: ZP | Performed by: INTERNAL MEDICINE

## 2018-02-05 PROCEDURE — 93010 ELECTROCARDIOGRAM REPORT: CPT | Mod: ZP | Performed by: INTERNAL MEDICINE

## 2018-02-05 RX ORDER — WARFARIN SODIUM 5 MG/1
5 TABLET ORAL DAILY
Qty: 90 TABLET | Refills: 3 | Status: SHIPPED | OUTPATIENT
Start: 2018-02-05 | End: 2018-07-31

## 2018-02-05 RX ORDER — FUROSEMIDE 20 MG
40 TABLET ORAL DAILY
Qty: 180 TABLET | Refills: 3 | Status: SHIPPED | OUTPATIENT
Start: 2018-02-05 | End: 2018-02-09

## 2018-02-05 RX ADMIN — Medication 4 ML: at 12:00

## 2018-02-05 ASSESSMENT — PAIN SCALES - GENERAL: PAINLEVEL: NO PAIN (0)

## 2018-02-05 NOTE — PATIENT INSTRUCTIONS
Patient Instructions:  It was a pleasure to see you in the cardiology clinic today.      If you have any questions, call  Jeaneth Montero RN, at [phone].  Press Option #1 for the [facility], and then press Option #3 for nursing.  We are encouraging the use of MyChart to communicate with your HealthCare Provider    Note the new medications: Increase your Lasix to 40 mg everyday  Start taking Coumadin 5 mg by mouth everyday    You will be getting a call from the Coumadin clinic about INR's and coumadin therapy    Stop the following medications: Plavix    The results from today include: Results for PETER BUTLER (MRN [id number]) as of 2/5/2018 14:07   Ref. Range 2/5/2018 11:02   Sodium Latest Ref Range: 133 - 144 mmol/L 139   Potassium Latest Ref Range: 3.4 - 5.3 mmol/L 4.2   Chloride Latest Ref Range: 94 - 109 mmol/L 105   Carbon Dioxide Latest Ref Range: 20 - 32 mmol/L 24   Urea Nitrogen Latest Ref Range: 7 - 30 mg/dL 20   Creatinine Latest Ref Range: 0.66 - 1.25 mg/dL 1.05   GFR Estimate Latest Ref Range: >60 mL/min/1.7m2 69   GFR Estimate If Black Latest Ref Range: >60 mL/min/1.7m2 83   Calcium Latest Ref Range: 8.5 - 10.1 mg/dL 8.8   Anion Gap Latest Ref Range: 3 - 14 mmol/L 9   Glucose Latest Ref Range: 70 - 99 mg/dL 167 (H)     Please follow up with [facility] this Friday with labs  Follow up with Dr. Valdes in one month      If you have an urgent need after hours (8:00 am to 4:30 pm) please call [phone] and ask for the cardiology fellow on call.

## 2018-02-05 NOTE — LETTER
2/5/2018      RE: Tad Manning  [address]   [place] [id number]       Dear Colleague,    Thank you for the opportunity to participate in the care of your patient, Tad Manning, at the [facility]. Please see a copy of my visit note below.        Chief Complaint: Evaluation of HFrEF/ICM    HPI:   Mr. Tad Manning is a 77-year old male with a past medical history of CLL in remission, CAD c/b ICM, atrial flutter, earlier in the year admitted for discitis and multple admission for decompensated ICM/HFrEF who presented for follow-up of cardiomyopathy. He was noted to have a depressed LVEF of 25-30%. He had previously undergone a triple vessel CAB in 1995 and has hypertension, diabetes and dyslipidemia.     After his stay at the [facility], he is home now. He denies anginal pain but has very limited exercise tolerance with exertional dyspnea. He is on a good medical regimen that he is compliant with. His weight is up and his appetite is much improved.    ROS otherwise negative    Current Outpatient Prescriptions   Medication Sig Dispense Refill     aspirin 81 MG tablet Take 1 tablet (81 mg) by mouth daily       ALPRAZolam (XANAX) 0.25 MG tablet TK 1 T PO QHS PRN  1     furosemide (LASIX) 20 MG tablet Take 1 tablet (20 mg) by mouth daily 90 tablet 3     lisinopril (PRINIVIL/ZESTRIL) 5 MG tablet Take 1 tablet (5 mg) by mouth daily 90 tablet 3     acetaminophen (TYLENOL) 325 MG tablet Take 325 mg by mouth every 4 hours as needed for mild pain       metoprolol (TOPROL-XL) 200 MG 24 hr tablet Take 1 tablet (200 mg) by mouth daily 90 tablet 3     Citalopram Hydrobromide (CELEXA PO) Take 10 mg by mouth daily       spironolactone (ALDACTONE) 25 MG tablet Take 0.5 tablets (12.5 mg) by mouth daily 45 tablet 3     atorvastatin (LIPITOR) 40 MG tablet Take 1 tablet (40 mg) by mouth daily 30 tablet 1     clopidogrel (PLAVIX) 75 MG tablet Take 1  tablet (75 mg) by mouth daily 30 tablet      metFORMIN (GLUCOPHAGE) 500 MG tablet Take 1 tablet (500 mg) by mouth 2 times daily (with meals) 60 tablet      ranitidine (ZANTAC) 150 MG tablet Take 1 tablet (150 mg) by mouth 2 times daily 60 tablet      cholecalciferol 1000 UNITS TABS Take 1,000 Units by mouth daily 30 tablet      tamsulosin (FLOMAX) 0.4 MG capsule Take 2 capsules (0.8 mg) by mouth At Bedtime 60 capsule      nitroglycerin (NITROSTAT) 0.4 MG sublingual tablet For chest pain place 1 tablet under the tongue every 5 minutes for 3 doses. If symptoms persist 5 minutes after 1st dose call 911. 94 tablet      order for DME Equipment being ordered: DH2 shoe 1 Device 0     lisinopril (PRINIVIL/ZESTRIL) 10 MG tablet TK 1 T PO D  1     nystatin (MYCOSTATIN) cream Apply topically 2 times daily       benzonatate (TESSALON) 100 MG capsule Take 1 capsule (100 mg) by mouth 3 times daily as needed for cough (Patient not taking: Reported on 9/18/2017) 42 capsule        Past Medical History:   Diagnosis Date     Arthritis      ASCVD (arteriosclerotic cardiovascular disease)      BMI 30.0-30.9,adult      BPH (benign prostatic hypertrophy)      Cellulitis      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)      Coronary artery disease     triple bypass 1995     Diabetes mellitus (H)      Diabetic polyneuropathy (H)      History of blood transfusion      Hyperlipidaemia      Hypertension      Malignant neoplasm (H)     CLL     Noninflammatory pericardial effusion      Osteomyelitis of left foot (H)      PVD (peripheral vascular disease) (H)      Sebaceous cyst        Past Surgical History:   Procedure Laterality Date     AMPUTATE TOE(S)  1/10/2014    Procedure: AMPUTATE TOE(S);;  Surgeon: Amador Vick MD;  Location: [facility] OR     BONE MARROW BIOPSY, BONE SPECIMEN, NEEDLE/TROCAR  10/19/2012    Procedure: BIOPSY BONE MARROW;  BONE MARROW BIOPSY  (CONSCIOUS SED);  Surgeon: Ramon Quesada,  MD;  Location:  GI     BYPASS GRAFT FEMOROPOPLITEAL  1/10/2014    Procedure: BYPASS GRAFT FEMOROPOPLITEAL;  Left above knee popliteal to  Below knee popliteal bypass using transverse saphenous vein graft. Left 2nd Toe amputation;  Surgeon: Amador Vick MD;  Location:  OR     CARDIAC SURGERY      angioplasty with stent, triple bypass     EXCISE CYST GENERIC (LOCATION) N/A 2/1/2016    Procedure: EXCISE CYST GENERIC (LOCATION);  Surgeon: Amador Vick MD;  Location:  [facility]     GRAFT VEIN FROM EXTREMITY (LOCATION)  1/10/2014    Procedure: GRAFT VEIN FROM EXTREMITY (LOCATION);;  Surgeon: Amador Vick MD;  Location:  OR     LAMINECTOMY THORACIC ONE LEVEL N/A 2/8/2017    Procedure: LAMINECTOMY THORACIC ONE LEVEL;  Surgeon: Marcelo Trent MD;  Location: [facility]     OPTICAL TRACKING SYSTEM FUSION POSTERIOR SPINE THORACIC THREE+ LEVELS N/A 2/12/2017    Procedure: OPTICAL TRACKING SYSTEM FUSION POSTERIOR SPINE THORACIC THREE+ LEVELS;  Surgeon: Marcelo Trent MD;  Location: [facility] OR     TONSILLECTOMY       VASCULAR SURGERY      ptcr legs   T9-10 discitis with overlying epidural abscess  S/p laminectomy / abscess evacuation.  Critical lower limb ischemia , s/p L AK pop to BK pop bypass with rGSV on 1/10/2014  Family History   Problem Relation Age of Onset     CANCER Mother      leukemia       Social History   Substance Use Topics     Smoking status: Former Smoker     Packs/day: 2.00     Years: 30.00     Types: Cigarettes     Quit date: 6/19/1995     Smokeless tobacco: Never Used     Alcohol use Yes      Comment: 1 drink monthly       Allergies   Allergen Reactions     Blood Transfusion Related (Informational Only) Other (See Comments)     Patient has a history of a clinically significant antibody against RBC antigens.  A delay in compatible RBCs may occur.          ROS:   CONSTITUTIONAL:No report of fever, chills,  or change in weight  RESPIRATORY: No cough, wheezing, SOB, or  hemoptysis  CARDIOVASCULAR: see HPI  MUSCULO-SKELETAL: No joint pain/swelling, no muslce pain  NEURO: No paresthesias, syncope, pre-syncope, light headness, dizziness or vertigo  ENDOCRINE: No temperature intolerance, no skin/hair changes  PSYCHIATRIC: No change in mood, feeling down/anxious, no change in sleep or appetite  GI: no melena or hematochezia, no change in bowel habits  : no hematuria or dysurea, no hesitancy, dribbling or incontinence  HEME: no easy bruising or bleeding, no history of anemia, no history of blood clots  SKIN: no rashes or sores, no unusual itching    Physical Examination:  Vitals: /74  Pulse 75  Ht 1.829 m (6')  Wt 97.7 kg (215 lb 6.4 oz)  SpO2 91%  BMI 29.21 kg/m2  BMI= Body mass index is 29.21 kg/(m^2).    Weight continues to increase     In general, the patient is a pleasant male in no apparent distress.      HEENT: NC/AT.  PERRLA.  EOMI.  Sclerae white, not injected.    Neck: Carotids 2+ bilaterally without bruits.  + jugular venous distension. No thyromegaly.   Heart: RRR. Normal S1, S2. No murmur, rub, click, or gallop. There is no heave.    Lungs: Clear bilaterally.  No rhonchi, wheezes, rales.   Abdomen: Soft, nontender, nondistended.   Extremities: 1+ edema.  The pulses are 2+at the radial and DP bilaterally.  Neuro: grossly non focal, gait normal.  Skin: no rashes.    Laboratory:  Chemistry panel:   Recent Labs   Lab Test  02/05/18   1102  10/30/17   0857   04/08/17   0927  04/08/17   0014   04/06/17   0537  03/17/17   0544   NA  139  140   < >  144   --    < >  144  145*   POTASSIUM  4.2  4.4   < >  3.5  3.2*   < >  3.6  3.7   CHLORIDE  105  107   < >  106   --    < >  108  111*   CO2  24  24   < >  29   --    < >  25  26   ANIONGAP  9  9   < >  9   --    < >  10  8   GLC  167*  153*   < >  119*   --    < >  120*  128*   BUN  20  30   < >  9   --    < >  10  16   CR  1.05  1.09   < >  0.63*   --    < >  0.63*  0.57*   JUSTINO  8.8  9.0   < >  8.1*   --    < >  8.0*   8.6   MAG   --    --    --   1.4*  1.4*   --    --    --    GFRESTIMATED  69  66   < >  >90  Non  GFR Calc     --    < >  >90  Non  GFR Calc    >90  Non  GFR Calc     AST   --    --    --    --    --    --   10  13   ALT   --    --    --    --    --    --   17  25    < > = values in this interval not displayed.       CBC:   Recent Labs   Lab Test  05/25/17   1124  04/13/17   1214   WBC  6.7  8.3   RBC  3.97*  3.86*   HGB  11.0*  10.3*   HCT  36.5*  35.0*   MCV  92  91   MCH  27.7  26.7   MCHC  30.1*  29.4*   RDW  19.0*  18.3*   PLT  152  220       Lipid Panel:  Recent Labs   Lab Test  05/23/14   0745  01/10/14   0923   CHOL  134  92   HDL  34*  28*   LDL  48  45   TRIG  257*  92   CHOLHDLRATIO  3.9  3.3       Thyroid:   No results found for: TSH  No results found for: T4  Hemoglobin A1C   Date Value Ref Range Status   02/10/2017 6.1 (H) 4.3 - 6.0 % Final     INR   Date Value Ref Range Status   02/12/2017 1.22 (H) 0.86 - 1.14 Final     Cardiac data:  ECG today shows   Atrial fibrillation nonspecific ST-T abnormalities    E  CG 10/30/17   Atrial tach with variable AV block     EKG 04/06/17:    NSR Rate approx 125 bpm, LAD, no ischemic changes.     Echo (2/24/2017)  Technically difficult study.The patient's rhythm is atrial fibrillation.  The left ventricle is severely dilated. Left ventricular systolic function is  severely reduced, ejection fraction is estimated at 25-30%. There is severe  global hypokinesis.  Right ventricular systolic function is mildly reduced.  The right ventricular systolic pressure is approximated at 15 mmHg plus the  right atrial pressure. IVC is plethoric and estimated mean RA pressure is 15  mmHg.  No pericardial effusion present.      Assessment and recommendations:  Mr. Tad Manning is a 76-year old male with    1. HFrEF, stage C, NYHA 3  2. CAD, S/p CAB several years ago  3. Atrial fibrillation, new today was in atrial tachycardia in Oct  2017  4. Essential hypertension  5. Dyslipidemia  6. DM Type II  7. PAD with LE stents and bypass on plavix    From a heart failure standpoint, Mr. Manning has had weight gain and is dyspneic with minimal exertion. He is hypervolemic on exam and his weight is up. I have advised him to increase Lasix from 20 mg once daily to 40 mg daily and track his weights at home.  He will return for a follow-up end of the week in [facility] for reassessment.  We will also initiate warfarin for the atrial fibrillation and stop the plavix.  He has previously been in atrial fibrillation but not anticoagulated since high risk for fall - this has improved now with ongoing PT.    Echocardiogram today reveals severe LV dysfunction with ejection fraction of 20%.  On EKG has diffuse T-wave abnormalities in the setting of atrial fibrillation.  He is still reluctant to undergo a coronary angiogram at this point.  I will continue his medical therapy for this.His current regimen includes ASA 81 mg/d, warfarin, Atorvastatin 40 mg q24h, lisinopril 15 mg q24h, metoprolol 200 mg/d, spironolactone 12.5 mg/d and lasix 40 mg/d.  F/u 1 mo    Clarisse Valdes MD, MS  Staff Cardiologist  Pager: [phone]

## 2018-02-05 NOTE — PROGRESS NOTES
Patient referred to the [facility] today.  Atrial Fibrillation   INR goal Range 2.0 to 3.0  Patient was given an RX for warfarin 5mgs daily next INR to be on 2/9/18.  Information packet with HIPPA form sent to patient

## 2018-02-05 NOTE — MR AVS SNAPSHOT
Tad Manning   2/5/2018   Anticoagulation Therapy Visit    Description:  77 year old male   Provider:  Tad Huerta, [facility]   Department:  [facility]           INR as of 2/5/2018     Today's INR No new INR was available at the time of this encounter.      Anticoagulation Summary as of 2/5/2018     INR goal 2.0-3.0   Today's INR No new INR was available at the time of this encounter.   Full instructions 2/5: 5 mg; 2/6: 5 mg; 2/7: 5 mg; 2/8: 5 mg; Otherwise No maintenance plan   Next INR check 2/9/2018    Indications   Atrial fibrillation (H) [I48.91] [I48.91]  Long-term (current) use of anticoagulants [Z79.01] [Z79.01]         February 2018 Details    Sun Mon Tue Wed Thu Fri Sat         1               2               3                 4               5      5 mg   See details      6      5 mg         7      5 mg         8      5 mg         9            10                 11               12               13               14               15               16               17                 18               19               20               21               22               23               24                 25               26               27               28                   Date Details   02/05 This INR check       Date of next INR:  2/9/2018         How to take your warfarin dose     To take:  5 mg Take 1 of the 5 mg tablets.

## 2018-02-05 NOTE — NURSING NOTE
Labs: Patient was given results of the laboratory testing obtained today. Patient was instructed to return for the next laboratory testing in four days . Patient demonstrated understanding of this information and agreed to call with further questions or concerns.   Med Reconcile: Reviewed and verified all current medications with the patient. The updated medication list was printed and given to the patient.  New Medication: Patient was educated regarding newly prescribed medication, including discussion of  the indication, administration, side effects, and when to report to MD or RN. Patient demonstrated understanding of this information and agreed to call with further questions or concerns.  Return Appointment: Patient given instructions regarding scheduling next clinic visit. Patient demonstrated understanding of this information and agreed to call with further questions or concerns.  Medication Change: Patient was educated regarding prescribed medication change, including discussion of the indication, administration, side effects, and when to report to MD or RN. Patient demonstrated understanding of this information and agreed to call with further questions or concerns.  Patient stated he understood all health information given and agreed to call with further questions or concerns.

## 2018-02-05 NOTE — NURSING NOTE
Chief Complaint   Patient presents with     Follow Up For     manage systolic heart failure/EKG/ECHO prior/labs/last visit October 2017     Vitals were taken and medications were reconciled. EKG was performed    MOE Ocasio  1:25 PM

## 2018-02-05 NOTE — MR AVS SNAPSHOT
After Visit Summary   2/5/2018    Tad Butler    MRN: [id number]           Patient Information     Date Of Birth          1940        Visit Information        Provider Department      2/5/2018 1:00 PM Clarisse Valdes MD [facility]        Today's Diagnoses     Chronic systolic congestive heart failure (H)    -  1    Chronic systolic heart failure (H)        Atrial fibrillation (H)          Care Instructions    Patient Instructions:  It was a pleasure to see you in the cardiology clinic today.      If you have any questions, call  Jeaneth Montero RN, at [phone].  Press Option #1 for the [facility], and then press Option #3 for nursing.  We are encouraging the use of CSL DualComhart to communicate with your HealthCare Provider    Note the new medications: Increase your Lasix to 40 mg everyday  Start taking Coumadin 5 mg by mouth everyday    You will be getting a call from the Coumadin clinic about INR's and coumadin therapy    Stop the following medications: Plavix    The results from today include: Results for TAD BUTLER (MRN [id number]) as of 2/5/2018 14:07   Ref. Range 2/5/2018 11:02   Sodium Latest Ref Range: 133 - 144 mmol/L 139   Potassium Latest Ref Range: 3.4 - 5.3 mmol/L 4.2   Chloride Latest Ref Range: 94 - 109 mmol/L 105   Carbon Dioxide Latest Ref Range: 20 - 32 mmol/L 24   Urea Nitrogen Latest Ref Range: 7 - 30 mg/dL 20   Creatinine Latest Ref Range: 0.66 - 1.25 mg/dL 1.05   GFR Estimate Latest Ref Range: >60 mL/min/1.7m2 69   GFR Estimate If Black Latest Ref Range: >60 mL/min/1.7m2 83   Calcium Latest Ref Range: 8.5 - 10.1 mg/dL 8.8   Anion Gap Latest Ref Range: 3 - 14 mmol/L 9   Glucose Latest Ref Range: 70 - 99 mg/dL 167 (H)     Please follow up with CORE clinic this Friday with labs  Follow up with Dr. Valdes in one month      If you have an urgent need after hours (8:00 am to 4:30 pm) please call [phone] and ask for the  cardiology fellow on call.            Follow-ups after your visit        Additional Services     INR Clinic Referral-Coumadin Clinic           Follow-Up with Cardiologist           Follow-Up with Cardiac Advanced Practice Provider       Appointment for this Friday (February 9th) with labs                  Your next 10 appointments already scheduled     Feb 09, 2018 12:30 PM CST   Lab with  LAB   [facility] ([facility])    [address]  1st Floor  [place] [address]   [phone]            Feb 09, 2018  1:00 PM CST   (Arrive by 12:45 PM)   CORE RETURN with OFELIA Rivera CNP   [facility] ([facility])    [address]  [address]  [place] [address] [phone]            Feb 26, 2018  1:30 PM CST   (Arrive by 1:15 PM)   Return Visit with Clarisse Valdes MD   [facility] ([facility])    [address]  [address]  [place] [address] [phone]            Apr 16, 2018 10:00 AM CDT   (Arrive by 9:45 AM)   CT THORACIC SPINE W/O CONTRAST with UCCT1   [facility] [place] [facility] ([facility])    [address]  [address] [place] [address] [phone]           Please bring any scans or X-rays taken at other hospitals, if similar tests were done. Also bring a list of your medicines, including vitamins, minerals and over-the-counter drugs. It is safest to leave personal items at home.  Be sure to tell your doctor:   If you have any allergies.   If there s any chance you are pregnant.   If you are breastfeeding.   If you have any special needs.  You do not need to do anything special to prepare.  Please wear loose clothing, such as a sweat suit or jogging clothes. Avoid snaps, zippers and other metal. We may ask you to undress and put on a hospital gown.            Apr 16, 2018 10:30 AM CDT   XR THORACIC/LUMBAR STANDING 2 VIEWS (SCOLI)  with UCXR1   [facility] ([facility])    [address]  1st Floor  [place] [address] [phone]           Please bring a list of your current medicines to your exam. (Include vitamins, minerals and over-thecounter medicines.) Leave your valuables at home.  Tell your doctor if there is a chance you may be pregnant.  You do not need to do anything special for this exam.            Apr 16, 2018 11:15 AM CDT   (Arrive by 11:00 AM)   Return Visit with Marcelo Trent MD   [facility] ([facility] [place])    [address]  3rd Floor  [place] [id number] [phone]              Future tests that were ordered for you today     Open Standing Orders        Priority Remaining Interval Expires Ordered    INR Routine 36/36  2/6/2018 2/5/2018          Open Future Orders        Priority Expected Expires Ordered    INR Clinic Referral-Coumadin Clinic Routine 2/9/2018 5/13/2018 2/5/2018    Follow-Up with Cardiologist Routine 3/5/2018 5/6/2018 2/5/2018    Follow-Up with Cardiac Advanced Practice Provider Routine 3/9/2018 5/6/2018 2/5/2018    Basic metabolic panel Routine 2/9/2018 5/6/2018 2/5/2018    N terminal pro BNP outpatient Routine 2/9/2018 5/6/2018 2/5/2018    CBC with platelets Routine 2/9/2018 5/6/2018 2/5/2018            Who to contact     If you have questions or need follow up information about today's clinic visit or your schedule please contact [facility] directly at [phone].  Normal or non-critical lab and imaging results will be communicated to you by MyChart, letter or phone within 4 business days after the clinic has received the results. If you do not hear from us within 7 days, please contact the clinic through MyChart or phone. If you have a critical or abnormal lab result, we will notify you by phone as soon as possible.  Submit refill requests through [employer] or call your pharmacy and they  will forward the refill request to us. Please allow 3 business days for your refill to be completed.          Additional Information About Your Visit        [employer] [place] [employer] ([facility]: [place] Information     [employer] gives you secure access to your electronic health record. If you see a primary care provider, you can also send messages to your care team and make appointments. If you have questions, please call your primary care clinic.  If you do not have a primary care provider, please call [phone] and they will assist you.        Care EveryWhere ID     This is your Care EveryWhere ID. This could be used by other organizations to access your [place] medical records  [id number]        Your Vitals Were     Pulse Height Pulse Oximetry BMI (Body Mass Index)          75 1.829 m (6') 91% 29.21 kg/m2         Blood Pressure from Last 3 Encounters:   02/05/18 124/74   10/30/17 110/64   09/18/17 93/57    Weight from Last 3 Encounters:   02/05/18 97.7 kg (215 lb 6.4 oz)   10/30/17 85.2 kg (187 lb 14.4 oz)   09/18/17 82.1 kg (181 lb)              We Performed the Following     EKG 12-lead, tracing only (Same Day)          Today's Medication Changes          These changes are accurate as of 2/5/18  3:01 PM.  If you have any questions, ask your nurse or doctor.               Start taking these medicines.        Dose/Directions    warfarin 5 MG tablet   Commonly known as:  COUMADIN   Used for:  Atrial fibrillation (H)   Started by:  Clarisse Valdes MD        Dose:  5 mg   Take 1 tablet (5 mg) by mouth daily Dose adjusted per Coumadin Clinic.   Quantity:  90 tablet   Refills:  3         These medicines have changed or have updated prescriptions.        Dose/Directions    furosemide 20 MG tablet   Commonly known as:  LASIX   This may have changed:  how much to take   Used for:  Chronic systolic heart failure (H)   Changed by:  Clarisse Valdes MD        Dose:  40 mg   Take 2 tablets (40 mg) by mouth daily   Quantity:  180 tablet   Refills:  3             Where to get your medicines      These medications were sent to [facility] [address] [facility] [address] AT [facility]  [address], [place] [facility] [address]     Phone:  [phone]     furosemide 20 MG tablet    warfarin 5 MG tablet                Primary Care Provider Office Phone # Fax #    Gene Guevara [phone]       [address] [address]        Equal Access to Services     [facility] : Hadii aad ku hadasho Soomaali, waaxda luqadaha, qaybta kaalmada adeegyada, waxay angelin hayaan teodoro howard . So [facility] [phone].    ATENCIÓN: Si habla español, tiene a noe disposición servicios gratuitos de asistencia lingüística. [facility] [phone].    We comply with applicable federal civil rights laws and [place] laws. We do not discriminate on the basis of race, color, national origin, age, disability, sex, sexual orientation, or gender identity.            Thank you!     Thank you for choosing [facility]  for your care. Our goal is always to provide you with excellent care. Hearing back from our patients is one way we can continue to improve our services. Please take a few minutes to complete the written survey that you may receive in the mail after your visit with us. Thank you!             Your Updated Medication List - Protect others around you: Learn how to safely use, store and throw away your medicines at www.disposemymeds.org.          This list is accurate as of 2/5/18  3:01 PM.  Always use your most recent med list.                   Brand Name Dispense Instructions for use Diagnosis    acetaminophen 325 MG tablet    TYLENOL     Take 325 mg by mouth every 4 hours as needed for mild pain        ALPRAZolam 0.25 MG tablet    XANAX     TK 1 T PO QHS PRN    Epidural abscess       aspirin 81 MG tablet      Take 1 tablet (81 mg) by mouth daily        atorvastatin 40 MG tablet    LIPITOR     30 tablet    Take 1 tablet (40 mg) by mouth daily    Claudication of left lower extremity (H)       benzonatate 100 MG capsule    TESSALON    42 capsule    Take 1 capsule (100 mg) by mouth 3 times daily as needed for cough    Acute on chronic systolic congestive heart failure (H)       CELEXA PO      Take 10 mg by mouth daily        cholecalciferol 1000 UNITS Tabs     30 tablet    Take 1,000 Units by mouth daily    Loop diuretic causing adverse effect in therapeutic use, subsequent encounter       furosemide 20 MG tablet    LASIX    180 tablet    Take 2 tablets (40 mg) by mouth daily    Chronic systolic heart failure (H)       * lisinopril 10 MG tablet    PRINIVIL/ZESTRIL     TK 1 T PO D    Epidural abscess       * lisinopril 5 MG tablet    PRINIVIL/ZESTRIL    90 tablet    Take 1 tablet (5 mg) by mouth daily    Essential hypertension, Chronic diastolic heart failure (H)       metFORMIN 500 MG tablet    GLUCOPHAGE    60 tablet    Take 1 tablet (500 mg) by mouth 2 times daily (with meals)    Type 2 diabetes mellitus with diabetic polyneuropathy, without long-term current use of insulin (H)       metoprolol succinate 200 MG 24 hr tablet    TOPROL-XL    90 tablet    Take 1 tablet (200 mg) by mouth daily    Chronic systolic heart failure (H)       nitroGLYcerin 0.4 MG sublingual tablet    NITROSTAT    25 tablet    For chest pain place 1 tablet under the tongue every 5 minutes for 3 doses. If symptoms persist 5 minutes after 1st dose call 911.    Atrial flutter, unspecified type (H)       nystatin cream    MYCOSTATIN     Apply topically 2 times daily        order for DME     1 Device    Equipment being ordered: 2 shoe    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), Diabetic ulcer of left foot due to type 2 diabetes mellitus (H)       ranitidine 150 MG tablet    ZANTAC    60 tablet    Take 1 tablet (150 mg) by mouth 2 times daily    Gastroesophageal reflux disease without esophagitis       spironolactone 25  MG tablet    ALDACTONE    45 tablet    Take 0.5 tablets (12.5 mg) by mouth daily    Chronic systolic congestive heart failure (H)       tamsulosin 0.4 MG capsule    FLOMAX    60 capsule    Take 2 capsules (0.8 mg) by mouth At Bedtime    Renal insufficiency       warfarin 5 MG tablet    COUMADIN    90 tablet    Take 1 tablet (5 mg) by mouth daily Dose adjusted per Coumadin Clinic.    Atrial fibrillation (H)       * Notice:  This list has 2 medication(s) that are the same as other medications prescribed for you. Read the directions carefully, and ask your doctor or other care provider to review them with you.

## 2018-02-05 NOTE — PROGRESS NOTES
Chief Complaint: Evaluation of HFrEF/ICM    HPI:   Mr. Tad Manning is a 77-year old male with a past medical history of CLL in remission, CAD c/b ICM, atrial flutter, earlier in the year admitted for discitis and multple admission for decompensated ICM/HFrEF who presented for follow-up of cardiomyopathy. He was noted to have a depressed LVEF of 25-30%. He had previously undergone a triple vessel CAB in 1995 and has hypertension, diabetes and dyslipidemia.     After his stay at the [facility], he is home now. He denies anginal pain but has very limited exercise tolerance with exertional dyspnea. He is on a good medical regimen that he is compliant with. His weight is up and his appetite is much improved.    ROS otherwise negative    Current Outpatient Prescriptions   Medication Sig Dispense Refill     aspirin 81 MG tablet Take 1 tablet (81 mg) by mouth daily       ALPRAZolam (XANAX) 0.25 MG tablet TK 1 T PO QHS PRN  1     furosemide (LASIX) 20 MG tablet Take 1 tablet (20 mg) by mouth daily 90 tablet 3     lisinopril (PRINIVIL/ZESTRIL) 5 MG tablet Take 1 tablet (5 mg) by mouth daily 90 tablet 3     acetaminophen (TYLENOL) 325 MG tablet Take 325 mg by mouth every 4 hours as needed for mild pain       metoprolol (TOPROL-XL) 200 MG 24 hr tablet Take 1 tablet (200 mg) by mouth daily 90 tablet 3     Citalopram Hydrobromide (CELEXA PO) Take 10 mg by mouth daily       spironolactone (ALDACTONE) 25 MG tablet Take 0.5 tablets (12.5 mg) by mouth daily 45 tablet 3     atorvastatin (LIPITOR) 40 MG tablet Take 1 tablet (40 mg) by mouth daily 30 tablet 1     clopidogrel (PLAVIX) 75 MG tablet Take 1 tablet (75 mg) by mouth daily 30 tablet      metFORMIN (GLUCOPHAGE) 500 MG tablet Take 1 tablet (500 mg) by mouth 2 times daily (with meals) 60 tablet      ranitidine (ZANTAC) 150 MG tablet Take 1 tablet (150 mg) by mouth 2 times daily 60 tablet      cholecalciferol 1000 UNITS TABS Take 1,000 Units by mouth daily 30 tablet       tamsulosin (FLOMAX) 0.4 MG capsule Take 2 capsules (0.8 mg) by mouth At Bedtime 60 capsule      nitroglycerin (NITROSTAT) 0.4 MG sublingual tablet For chest pain place 1 tablet under the tongue every 5 minutes for 3 doses. If symptoms persist 5 minutes after 1st dose call 911. 25 tablet      order for DME Equipment being ordered: DH2 shoe 1 Device 0     lisinopril (PRINIVIL/ZESTRIL) 10 MG tablet TK 1 T PO D  1     nystatin (MYCOSTATIN) cream Apply topically 2 times daily       benzonatate (TESSALON) 100 MG capsule Take 1 capsule (100 mg) by mouth 3 times daily as needed for cough (Patient not taking: Reported on 9/18/2017) 42 capsule        Past Medical History:   Diagnosis Date     Arthritis      ASCVD (arteriosclerotic cardiovascular disease)      BMI 30.0-30.9,adult      BPH (benign prostatic hypertrophy)      Cellulitis      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)      Coronary artery disease     triple bypass 1995     Diabetes mellitus (H)      Diabetic polyneuropathy (H)      History of blood transfusion      Hyperlipidaemia      Hypertension      Malignant neoplasm (H)     CLL     Noninflammatory pericardial effusion      Osteomyelitis of left foot (H)      PVD (peripheral vascular disease) (H)      Sebaceous cyst        Past Surgical History:   Procedure Laterality Date     AMPUTATE TOE(S)  1/10/2014    Procedure: AMPUTATE TOE(S);;  Surgeon: Amador Vick MD;  Location:  OR     BONE MARROW BIOPSY, BONE SPECIMEN, NEEDLE/TROCAR  10/19/2012    Procedure: BIOPSY BONE MARROW;  BONE MARROW BIOPSY  (CONSCIOUS SED);  Surgeon: Ramon Quesada MD;  Location:  GI     BYPASS GRAFT FEMOROPOPLITEAL  1/10/2014    Procedure: BYPASS GRAFT FEMOROPOPLITEAL;  Left above knee popliteal to  Below knee popliteal bypass using transverse saphenous vein graft. Left 2nd Toe amputation;  Surgeon: Amador Vick MD;  Location:  OR     CARDIAC SURGERY      angioplasty with stent,  triple bypass     EXCISE CYST GENERIC (LOCATION) N/A 2/1/2016    Procedure: EXCISE CYST GENERIC (LOCATION);  Surgeon: Amador Vick MD;  Location: [facility]     GRAFT VEIN FROM EXTREMITY (LOCATION)  1/10/2014    Procedure: GRAFT VEIN FROM EXTREMITY (LOCATION);;  Surgeon: Amador Vick MD;  Location: [facility] OR     LAMINECTOMY THORACIC ONE LEVEL N/A 2/8/2017    Procedure: LAMINECTOMY THORACIC ONE LEVEL;  Surgeon: Marcelo Trent MD;  Location: [facility] OR     OPTICAL TRACKING SYSTEM FUSION POSTERIOR SPINE THORACIC THREE+ LEVELS N/A 2/12/2017    Procedure: OPTICAL TRACKING SYSTEM FUSION POSTERIOR SPINE THORACIC THREE+ LEVELS;  Surgeon: Marcelo Trent MD;  Location: [facility] OR     TONSILLECTOMY       VASCULAR SURGERY      ptcr legs   T9-10 discitis with overlying epidural abscess  S/p laminectomy / abscess evacuation.  Critical lower limb ischemia , s/p L AK pop to BK pop bypass with rGSV on 1/10/2014  Family History   Problem Relation Age of Onset     CANCER Mother      leukemia       Social History   Substance Use Topics     Smoking status: Former Smoker     Packs/day: 2.00     Years: 30.00     Types: Cigarettes     Quit date: 6/19/1995     Smokeless tobacco: Never Used     Alcohol use Yes      Comment: 1 drink monthly       Allergies   Allergen Reactions     Blood Transfusion Related (Informational Only) Other (See Comments)     Patient has a history of a clinically significant antibody against RBC antigens.  A delay in compatible RBCs may occur.          ROS:   CONSTITUTIONAL:No report of fever, chills,  or change in weight  RESPIRATORY: No cough, wheezing, SOB, or hemoptysis  CARDIOVASCULAR: see HPI  MUSCULO-SKELETAL: No joint pain/swelling, no muslce pain  NEURO: No paresthesias, syncope, pre-syncope, light headness, dizziness or vertigo  ENDOCRINE: No temperature intolerance, no skin/hair changes  PSYCHIATRIC: No change in mood, feeling down/anxious, no change in sleep or appetite  GI: no melena or  hematochezia, no change in bowel habits  : no hematuria or dysurea, no hesitancy, dribbling or incontinence  HEME: no easy bruising or bleeding, no history of anemia, no history of blood clots  SKIN: no rashes or sores, no unusual itching    Physical Examination:  Vitals: /74  Pulse 75  Ht 1.829 m (6')  Wt 97.7 kg (215 lb 6.4 oz)  SpO2 91%  BMI 29.21 kg/m2  BMI= Body mass index is 29.21 kg/(m^2).    Weight continues to increase     In general, the patient is a pleasant male in no apparent distress.      HEENT: NC/AT.  PERRLA.  EOMI.  Sclerae white, not injected.    Neck: Carotids 2+ bilaterally without bruits.  + jugular venous distension. No thyromegaly.   Heart: RRR. Normal S1, S2. No murmur, rub, click, or gallop. There is no heave.    Lungs: Clear bilaterally.  No rhonchi, wheezes, rales.   Abdomen: Soft, nontender, nondistended.   Extremities: 1+ edema.  The pulses are 2+at the radial and DP bilaterally.  Neuro: grossly non focal, gait normal.  Skin: no rashes.    Laboratory:  Chemistry panel:   Recent Labs   Lab Test  02/05/18   1102  10/30/17   0857   04/08/17   0927  04/08/17   0014   04/06/17   0537  03/17/17   0544   NA  139  140   < >  144   --    < >  144  145*   POTASSIUM  4.2  4.4   < >  3.5  3.2*   < >  3.6  3.7   CHLORIDE  105  107   < >  106   --    < >  108  111*   CO2  24  24   < >  29   --    < >  25  26   ANIONGAP  9  9   < >  9   --    < >  10  8   GLC  167*  153*   < >  119*   --    < >  120*  128*   BUN  20  30   < >  9   --    < >  10  16   CR  1.05  1.09   < >  0.63*   --    < >  0.63*  0.57*   JUSTINO  8.8  9.0   < >  8.1*   --    < >  8.0*  8.6   MAG   --    --    --   1.4*  1.4*   --    --    --    GFRESTIMATED  69  66   < >  >90  Non  GFR Calc     --    < >  >90  Non  GFR Calc    >90  Non  GFR Calc     AST   --    --    --    --    --    --   10  13   ALT   --    --    --    --    --    --   17  25    < > = values in this interval  not displayed.       CBC:   Recent Labs   Lab Test  05/25/17   1124  04/13/17   1214   WBC  6.7  8.3   RBC  3.97*  3.86*   HGB  11.0*  10.3*   HCT  36.5*  35.0*   MCV  92  91   MCH  27.7  26.7   MCHC  30.1*  29.4*   RDW  19.0*  18.3*   PLT  152  220       Lipid Panel:  Recent Labs   Lab Test  05/23/14   0745  01/10/14   0923   CHOL  134  92   HDL  34*  28*   LDL  48  45   TRIG  257*  92   CHOLHDLRATIO  3.9  3.3       Thyroid:   No results found for: TSH  No results found for: T4  Hemoglobin A1C   Date Value Ref Range Status   02/10/2017 6.1 (H) 4.3 - 6.0 % Final     INR   Date Value Ref Range Status   02/12/2017 1.22 (H) 0.86 - 1.14 Final     Cardiac data:  ECG today shows   Atrial fibrillation nonspecific ST-T abnormalities    E  CG 10/30/17   Atrial tach with variable AV block     EKG 04/06/17:    NSR Rate approx 125 bpm, LAD, no ischemic changes.     Echo (2/24/2017)  Technically difficult study.The patient's rhythm is atrial fibrillation.  The left ventricle is severely dilated. Left ventricular systolic function is  severely reduced, ejection fraction is estimated at 25-30%. There is severe  global hypokinesis.  Right ventricular systolic function is mildly reduced.  The right ventricular systolic pressure is approximated at 15 mmHg plus the  right atrial pressure. IVC is plethoric and estimated mean RA pressure is 15  mmHg.  No pericardial effusion present.      Assessment and recommendations:  Mr. Tad Manning is a 76-year old male with    1. HFrEF, stage C, NYHA 3  2. CAD, S/p CAB several years ago  3. Atrial fibrillation, new today was in atrial tachycardia in Oct 2017  4. Essential hypertension  5. Dyslipidemia  6. DM Type II  7. PAD with LE stents and bypass on plavix    From a heart failure standpoint, Mr. Manning has had weight gain and is dyspneic with minimal exertion. He is hypervolemic on exam and his weight is up. I have advised him to increase Lasix from 20 mg once daily to 40 mg daily and  track his weights at home.  He will return for a follow-up end of the week in [facility] clinic for reassessment.  We will also initiate warfarin for the atrial fibrillation and stop the plavix.  He has previously been in atrial fibrillation but not anticoagulated since high risk for fall - this has improved now with ongoing PT.    Echocardiogram today reveals severe LV dysfunction with ejection fraction of 20%.  On EKG has diffuse T-wave abnormalities in the setting of atrial fibrillation.  He is still reluctant to undergo a coronary angiogram at this point.  I will continue his medical therapy for this.His current regimen includes ASA 81 mg/d, warfarin, Atorvastatin 40 mg q24h, lisinopril 15 mg q24h, metoprolol 200 mg/d, spironolactone 12.5 mg/d and lasix 40 mg/d.  F/u 1 mo  Clarisse Valdes MD, MS  Staff Cardiologist  Pager: [phone]

## 2018-02-06 PROBLEM — M62.81 GENERALIZED MUSCLE WEAKNESS: Status: RESOLVED | Noted: 2017-10-02 | Resolved: 2018-02-06

## 2018-02-06 LAB — INTERPRETATION ECG - MUSE: NORMAL

## 2018-02-07 ENCOUNTER — CARE COORDINATION (OUTPATIENT)
Dept: CARDIOLOGY | Facility: CLINIC | Age: 78
End: 2018-02-07

## 2018-02-07 DIAGNOSIS — I50.22 CHRONIC SYSTOLIC HEART FAILURE (H): Primary | ICD-10-CM

## 2018-02-09 ENCOUNTER — OFFICE VISIT (OUTPATIENT)
Dept: CARDIOLOGY | Facility: CLINIC | Age: 78
End: 2018-02-09
Attending: NURSE PRACTITIONER
Payer: MEDICARE

## 2018-02-09 ENCOUNTER — ANTICOAGULATION THERAPY VISIT (OUTPATIENT)
Dept: ANTICOAGULATION | Facility: CLINIC | Age: 78
End: 2018-02-09

## 2018-02-09 ENCOUNTER — CARE COORDINATION (OUTPATIENT)
Dept: CARDIOLOGY | Facility: CLINIC | Age: 78
End: 2018-02-09

## 2018-02-09 VITALS
HEART RATE: 76 BPM | BODY MASS INDEX: 28.46 KG/M2 | DIASTOLIC BLOOD PRESSURE: 74 MMHG | OXYGEN SATURATION: 97 % | HEIGHT: 72 IN | WEIGHT: 210.1 LBS | SYSTOLIC BLOOD PRESSURE: 127 MMHG

## 2018-02-09 DIAGNOSIS — I10 ESSENTIAL HYPERTENSION: ICD-10-CM

## 2018-02-09 DIAGNOSIS — I50.22 CHRONIC SYSTOLIC HEART FAILURE (H): ICD-10-CM

## 2018-02-09 DIAGNOSIS — I50.32 CHRONIC DIASTOLIC HEART FAILURE (H): ICD-10-CM

## 2018-02-09 DIAGNOSIS — I25.10 CORONARY ARTERY DISEASE INVOLVING NATIVE CORONARY ARTERY OF NATIVE HEART WITHOUT ANGINA PECTORIS: Primary | ICD-10-CM

## 2018-02-09 DIAGNOSIS — Z79.01 LONG-TERM (CURRENT) USE OF ANTICOAGULANTS: ICD-10-CM

## 2018-02-09 DIAGNOSIS — I48.91 ATRIAL FIBRILLATION (H): ICD-10-CM

## 2018-02-09 LAB
ANION GAP SERPL CALCULATED.3IONS-SCNC: 9 MMOL/L (ref 3–14)
BUN SERPL-MCNC: 22 MG/DL (ref 7–30)
CALCIUM SERPL-MCNC: 8.7 MG/DL (ref 8.5–10.1)
CHLORIDE SERPL-SCNC: 103 MMOL/L (ref 94–109)
CO2 SERPL-SCNC: 26 MMOL/L (ref 20–32)
CREAT SERPL-MCNC: 1.07 MG/DL (ref 0.66–1.25)
ERYTHROCYTE [DISTWIDTH] IN BLOOD BY AUTOMATED COUNT: 16.7 % (ref 10–15)
GFR SERPL CREATININE-BSD FRML MDRD: 67 ML/MIN/1.7M2
GLUCOSE SERPL-MCNC: 131 MG/DL (ref 70–99)
HCT VFR BLD AUTO: 39.7 % (ref 40–53)
HGB BLD-MCNC: 12.4 G/DL (ref 13.3–17.7)
INR PPP: 1.4 (ref 0.86–1.14)
MCH RBC QN AUTO: 28.8 PG (ref 26.5–33)
MCHC RBC AUTO-ENTMCNC: 31.2 G/DL (ref 31.5–36.5)
MCV RBC AUTO: 92 FL (ref 78–100)
NT-PROBNP SERPL-MCNC: ABNORMAL PG/ML (ref 0–450)
PLATELET # BLD AUTO: 99 10E9/L (ref 150–450)
POTASSIUM SERPL-SCNC: 4.1 MMOL/L (ref 3.4–5.3)
RBC # BLD AUTO: 4.31 10E12/L (ref 4.4–5.9)
SODIUM SERPL-SCNC: 138 MMOL/L (ref 133–144)
WBC # BLD AUTO: 5.4 10E9/L (ref 4–11)

## 2018-02-09 PROCEDURE — G0463 HOSPITAL OUTPT CLINIC VISIT: HCPCS | Mod: ZF

## 2018-02-09 PROCEDURE — 80048 BASIC METABOLIC PNL TOTAL CA: CPT | Performed by: INTERNAL MEDICINE

## 2018-02-09 PROCEDURE — 99214 OFFICE O/P EST MOD 30 MIN: CPT | Mod: ZP | Performed by: NURSE PRACTITIONER

## 2018-02-09 PROCEDURE — 83880 ASSAY OF NATRIURETIC PEPTIDE: CPT | Performed by: INTERNAL MEDICINE

## 2018-02-09 PROCEDURE — 85027 COMPLETE CBC AUTOMATED: CPT | Performed by: INTERNAL MEDICINE

## 2018-02-09 PROCEDURE — 85610 PROTHROMBIN TIME: CPT | Performed by: INTERNAL MEDICINE

## 2018-02-09 PROCEDURE — 36415 COLL VENOUS BLD VENIPUNCTURE: CPT | Performed by: INTERNAL MEDICINE

## 2018-02-09 RX ORDER — FUROSEMIDE 20 MG
TABLET ORAL
Qty: 180 TABLET | Refills: 3 | COMMUNITY
Start: 2018-02-09 | End: 2018-02-16

## 2018-02-09 RX ORDER — LISINOPRIL 10 MG/1
10 TABLET ORAL DAILY
Qty: 30 TABLET | Status: ON HOLD | COMMUNITY
Start: 2018-02-09 | End: 2018-09-24

## 2018-02-09 ASSESSMENT — PAIN SCALES - GENERAL: PAINLEVEL: NO PAIN (0)

## 2018-02-09 NOTE — LETTER
2/9/2018      RE: Tad Manning  [address]   [facility] [id number]       Dear Colleague,    Thank you for the opportunity to participate in the care of your patient, Tad Manning, at the [facility] acute [facility]. Please see a copy of my visit note below.    HPI:   Mr. Manning is a 77 year old male with a past medical history including CAD s/p CABG times three in 1995, ICM, atrial flutter, HTN, DM Type II, and Dyslipidemia. He presents to [facility] for follow up.     He was last seen by Dr. Valdes 2/5/18 with concern for decompensated SCHF and his Lasix was increased to 40 mg po daily. She recommended ischemic workup given Twave changes on EKG and worsening EF at 20% on echo, but her refused invasive diagnostics at that time. He agreed that if repeat Echo in 3 months is consistent with decline in EF to consider angiogram at that time.     He denies fever, chills, lightheadedness, dizziness, chest pain, palpitations, SOB, ALEXIS, PND, orthopnea, nausea, vomiting, diarrhea, hematochezia, melena, or LE edema. His weight is down at 210 lbs at home. He continues to follow a low sodium diet. He is able to walk about 200 feet prior to needing rest. He notes decreased appetite since 4/17.    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)      Coronary artery disease     triple bypass 1995     Diabetes mellitus (H)      Diabetic polyneuropathy (H)      History of blood transfusion      Hyperlipidaemia      Hypertension      Malignant neoplasm (H)     CLL     Noninflammatory pericardial effusion      Osteomyelitis of left foot (H)      PVD (peripheral vascular disease) (H)      Sebaceous cyst        FAMILY  HISTORY:  Family History   Problem Relation Age of Onset     CANCER Mother      leukemia       SOCIAL HISTORY:  Social History     Social History     Marital status: Single     Spouse name: N/A     Number of children: N/A     Years of education: N/A     Social History Main Topics     Smoking status: Former Smoker     Packs/day: 2.00     Years: 30.00     Types: Cigarettes     Quit date: 6/19/1995     Smokeless tobacco: Never Used     Alcohol use Yes      Comment: 1 drink monthly     Drug use: No     Sexual activity: Not on file     Other Topics Concern     Not on file     Social History Narrative    Lives independently with SO. 2/9/2017        CURRENT MEDICATIONS:  Outpatient Medications Prior to Visit   Medication Sig Dispense Refill     warfarin (COUMADIN) 5 MG tablet Take 1 tablet (5 mg) by mouth daily Dose adjusted per [facility]. 90 tablet 3     aspirin 81 MG tablet Take 1 tablet (81 mg) by mouth daily       ALPRAZolam (XANAX) 0.25 MG tablet TK 1 T PO QHS PRN  1     acetaminophen (TYLENOL) 325 MG tablet Take 325 mg by mouth every 4 hours as needed for mild pain       metoprolol (TOPROL-XL) 200 MG 24 hr tablet Take 1 tablet (200 mg) by mouth daily 90 tablet 3     Citalopram Hydrobromide (CELEXA PO) Take 10 mg by mouth daily       spironolactone (ALDACTONE) 25 MG tablet Take 0.5 tablets (12.5 mg) by mouth daily 45 tablet 3     atorvastatin (LIPITOR) 40 MG tablet Take 1 tablet (40 mg) by mouth daily 30 tablet 1     metFORMIN (GLUCOPHAGE) 500 MG tablet Take 1 tablet (500 mg) by mouth 2 times daily (with meals) 60 tablet      cholecalciferol 1000 UNITS TABS Take 1,000 Units by mouth daily 30 tablet      tamsulosin (FLOMAX) 0.4 MG capsule Take 2 capsules (0.8 mg) by mouth At Bedtime 60 capsule      nitroglycerin (NITROSTAT) 0.4 MG sublingual tablet For chest pain place 1 tablet under the tongue every 5 minutes for 3 doses. If symptoms persist 5 minutes after 1st dose call 911. 97 tablet      order for DME  Equipment being ordered: 2 shoe 1 Device 0     furosemide (LASIX) 20 MG tablet Take 2 tablets (40 mg) by mouth daily 180 tablet 3     lisinopril (PRINIVIL/ZESTRIL) 10 MG tablet TK 1 T PO D  1     lisinopril (PRINIVIL/ZESTRIL) 5 MG tablet Take 1 tablet (5 mg) by mouth daily 90 tablet 3     nystatin (MYCOSTATIN) cream Apply topically 2 times daily       ranitidine (ZANTAC) 150 MG tablet Take 1 tablet (150 mg) by mouth 2 times daily (Patient not taking: Reported on 2/9/2018) 60 tablet      benzonatate (TESSALON) 100 MG capsule Take 1 capsule (100 mg) by mouth 3 times daily as needed for cough (Patient not taking: Reported on 2/9/2018) 42 capsule      No facility-administered medications prior to visit.        ROS:   CONSTITUTIONAL: Denies fever, chills, and fatigue. He notes weight loss with increase in diuretics.   HEENT: Denies headache, vision changes, and changes in speech.   CV: Refer to HPI.   PULMONARY:Denies shortness of breath, cough, or previous TB exposure.   GI:Denies nausea, vomiting, diarrhea, and abdominal pain. Bowel movements are regular.   :Denies urinary alterations, dysuria, urinary frequency, hematuria, and abnormal drainage.   EXT:Denies lower extremity edema.   SKIN:Denies abnormal rashes or lesions.   MUSCULOSKELETAL:Denies upper or lower extremity weakness and pain.   NEUROLOGIC:Denies lightheadedness, dizziness, seizures, or upper or lower extremity paresthesia.     EXAM:  /74 (BP Location: Left arm, Patient Position: Chair, Cuff Size: Adult Regular)  Pulse 76  Ht 1.829 m (6')  Wt 95.3 kg (210 lb 1.6 oz)  SpO2 97%  BMI 28.49 kg/m2  GENERAL: Appears alert and oriented times three.   HEENT: Eye symmetrical and free of discharge bilaterally. Mucous membranes moist and without lesions.  NECK: Supple and without lymphadenopathy. JVD 10-12 cm.   CV: Irregular, controlled. S1S2 present without murmur, rub, or gallop.   RESPIRATORY: Respirations regular, even, and unlabored. Lungs CTA  throughout.   GI: Soft and non distended with normoactive bowel sounds present in all quadrants. No tenderness, rebound, guarding. No organomegaly.   EXTREMITIES: Trace bilateral LE edema. 2+ bilateral pedal pulses.   NEUROLOGIC: Alert and orientated x 3. CN II-XII grossly intact. No focal deficits.   MUSCULOSKELETAL: No joint swelling or tenderness.   SKIN: No jaundice. No rashes or lesions.     Labs:  CBC RESULTS:  Lab Results   Component Value Date    WBC 5.4 2018    RBC 4.31 (L) 2018    HGB 12.4 (L) 2018    HCT 39.7 (L) 2018    MCV 92 2018    MCH 28.8 2018    MCHC 31.2 (L) 2018    RDW 16.7 (H) 2018    PLT 99 (L) 2018       CMP RESULTS:  Lab Results   Component Value Date     2018    POTASSIUM 4.1 2018    CHLORIDE 103 2018    CO2 26 2018    ANIONGAP 9 2018     (H) 2018    BUN 22 2018    CR 1.07 2018    GFRESTIMATED 67 2018    GFRESTBLACK 81 2018    JUSTINO 8.7 2018    BILITOTAL 0.2 2017    ALBUMIN 2.4 (L) 2017    ALKPHOS 154 (H) 2017    ALT 17 2017    AST 10 2017        INR RESULTS:  Lab Results   Component Value Date    INR 1.40 (H) 2018       Lab Results   Component Value Date    MAG 1.4 (L) 2017     Lab Results   Component Value Date    NTBNPI 83701 (H) 2017     Lab Results   Component Value Date    NTBNP 95946 (H) 2018       Diagnostics:  Recent Results (from the past 4320 hour(s))   ECHO COMPLETE WITH OPTISON    Narrative    [id number]  [id number]  [id number]  [id number]^FABY^H           [facility]  Diagnostic and Treamtent-3rd Floor  [address] [place] [address]     Name: PETER BUTLER  MRN: [id number]  : 1940  Study Date: 2018 11:21 AM  Age: 77 yrs  Gender: Male  Patient Location: [facility]  Reason For Study: Chronic systolic congestive heart  failure  Ordering Physician: FABY LEON  Referring Physician: FABY LEON  Performed By: Jess Mcwilliams DO     BSA: 2.1 m2  Height: 72 in  Weight: 187 lb  BP: 110/64 mmHg  _____________________________________________________________________________  __        Procedure  Echocardiogram with two-dimensional, color and spectral Doppler performed.  Contrast Optison. Optison (NDC #2892-3850-73) given intravenously. Patient was  given 4 ml mixture of 3 ml Optison and 6 ml saline. 5 ml wasted.  _____________________________________________________________________________  __        Interpretation Summary  Severe left ventricular dilation is present.  Biplane LV EF 20%.  Severe diffuse hypokinesis is present.  Mild to moderate right ventricular dilation is present.  Global right ventricular function is moderately to severely reduced.  Dilation of the inferior vena cava is present with abnormal respiratory  variation in diameter.  No pericardial effusion is present.  A left pleural effusion is present.  _____________________________________________________________________________  __        Left Ventricle  Left ventricular wall thickness is normal. Severe left ventricular dilation is  present. Biplane LV EF 20%. Diastolic function not assessed due to atrial  fibrillation. Severe diffuse hypokinesis is present.     Right Ventricle  Mild to moderate right ventricular dilation is present. Global right  ventricular function is moderately to severely reduced.     Atria  The right atria appears normal. Severe left atrial enlargement is present.     Mitral Valve  The mitral valve is normal. Mild mitral insufficiency is present.        Aortic Valve  Mild aortic valve sclerosis is present. Trace to mild aortic insufficiency is  present.     Tricuspid Valve  The tricuspid valve is normal. Trace tricuspid insufficiency is present. The  right ventricular systolic pressure is approximated at 24.3 mmHg plus the  right atrial  pressure.     Pulmonic Valve  The pulmonic valve is normal. Trace pulmonic insufficiency is present.     Vessels  The aorta root is normal. The pulmonary artery cannot be assessed. Dilation of  the inferior vena cava is present with abnormal respiratory variation in  diameter.     Pericardium  No pericardial effusion is present.        Miscellaneous  A left pleural effusion is present.  _____________________________________________________________________________  __  MMode/2D Measurements & Calculations     IVSd: 0.68 cm  LVIDd: 6.2 cm  LVIDs: 5.8 cm  LVPWd: 0.66 cm  FS: 5.2 %  EDV(Teich): 191.9 ml  ESV(Teich): 169.7 ml  LV mass(C)d: 157.5 grams  LV mass(C)dI: 76.1 grams/m2  asc Aorta Diam: 3.3 cm  LVOT diam: 2.6 cm  LVOT area: 5.2 cm2  LA Volume (BP): 109.0 ml  LA Volume Index (BP): 52.7 ml/m2  RWT: 0.21           Doppler Measurements & Calculations  MV E max alessia: 63.7 cm/sec  MV dec time: 0.12 sec  MR ERO: 0.03 cm2  MR volume: 4.3 ml  TR max alessia: 246.5 cm/sec  TR max P.3 mmHg  E/E' av.3  Lateral E/e': 8.7  Medial E/e': 19.9     _____________________________________________________________________________  __           Report approved by: Edward De La Cruz 2018 01:35 PM          Assessment and Plan:   Mr. Manning is a 77 year old male with a past medical history including CAD s/p CABG times three in , ICM, atrial flutter, HTN, DM Type II, and Dyslipidemia. He presents to [facility] for follow up and remains hypervolemic.     Chronic systolic heart failure secondary to ICM.   Stage C, NYHA Class II  ACEi/ARB Increase Lisinopril to 10 mg po daily   BB Toprol  mg po daily   Aldosterone antagonist Aldcatone 12.5 mg po daily.   SCD prophylaxis Optimize GDMT  Fluid status Hypervolemic. Increase Lasix to 40 mg in AM and 20 mg at HS. Repeat BMP in 1 week.   Sleep Apnea Evaluation: Please recommend sleep referral at next visit.     CAD. S/p CABG times three. Note EKG at appointment with Dr. Valdes notes  Twave changes with recent reduction in EF to 20%.   - he is agreeable to angiogram at this time. Will obtain LHC and RHC in 2 weeks prior to his brother leaving town.   - Continue ASA, Toprol XL, and Lipitor.     Aflutter. CHADSVASC-4.   - Rate controlled on Toprol  mg po daily.   - he recently started Coumadin, but is agreeable to trying Xarelto. Discussed with Dr. Leon who is agreeable. Will assess copay and change if he is agreeable.     Dyslipidemia.   - Continue Lipitor.     DM Type II, controlled. Hgb A1C 2/10/17 6.1.   - Management per PCP.   - Currently on Metformin.     Follow up BMP in 1 week. Follow up in [facility] following angiogram.       Sugey Levi  2/8/2018        CC  FABY LEON H

## 2018-02-09 NOTE — MR AVS SNAPSHOT
After Visit Summary   2/9/2018    Tad Manning    MRN: [id number]           Patient Information     Date Of Birth          1940        Visit Information        Provider Department      2/9/2018 1:00 PM Sugey Levi APRN CNP [facility]        Today's Diagnoses     Chronic systolic heart failure (H)        Essential hypertension        Chronic diastolic heart failure (H)          Care Instructions    You were seen today in the Cardiovascular Clinic at the [facility].     Cardiology Providers you saw during your visit: Sugey Levi NP     1. Increase Lisinopril to 10 mg daily  2. Increase Lasix to 40 mg in the morning, 20 mg in the afternoon  3. Repeat Basic Metabolic Panel in one week.   4. Appointment for R/L Heart Cath (angiogram)  5. Schedule with Sugey after angiogram results.          Results for TAD MANNING (MRN [id number]) as of 2/9/2018 10:27   Ref. Range 2/9/2018 09:34   Sodium Latest Ref Range: 133 - 144 mmol/L 138   Potassium Latest Ref Range: 3.4 - 5.3 mmol/L 4.1   Chloride Latest Ref Range: 94 - 109 mmol/L 103   Carbon Dioxide Latest Ref Range: 20 - 32 mmol/L 26   Urea Nitrogen Latest Ref Range: 7 - 30 mg/dL 22   Creatinine Latest Ref Range: 0.66 - 1.25 mg/dL 1.07   GFR Estimate Latest Ref Range: >60 mL/min/1.7m2 67   GFR Estimate If Black Latest Ref Range: >60 mL/min/1.7m2 81   Calcium Latest Ref Range: 8.5 - 10.1 mg/dL 8.7   Anion Gap Latest Ref Range: 3 - 14 mmol/L 9   N-Terminal Pro Bnp Latest Ref Range: 0 - 450 pg/mL 02028 (H)   Glucose Latest Ref Range: 70 - 99 mg/dL 131 (H)   WBC Latest Ref Range: 4.0 - 11.0 10e9/L 5.4   Hemoglobin Latest Ref Range: 13.3 - 17.7 g/dL 12.4 (L)   Hematocrit Latest Ref Range: 40.0 - 53.0 % 39.7 (L)   Platelet Count Latest Ref Range: 150 - 450 10e9/L 99 (L)   RBC Count Latest Ref Range: 4.4 - 5.9 10e12/L 4.31 (L)   MCV Latest Ref Range: 78 - 100 fl 92   MCH Latest Ref Range: 26.5 - 33.0 pg 28.8   MCHC  "Latest Ref Range: 31.5 - 36.5 g/dL 31.2 (L)   RDW Latest Ref Range: 10.0 - 15.0 % 16.7 (H)   INR Latest Ref Range: 0.86 - 1.14  1.40 (H)       Please limit your fluid intake to 2 L (64 ounces) daily.  2 Liters a day = 8.5 cups, or 72 ounces.  Please limit your salt intake to 2 grams a day or less.     If you gain 2# in 24 hours or 5# in one week call Verónica Jarquin RN so we can adjust your medications as needed over the phone.     Please feel free to call me with any questions or concerns.       Verónica Jarquin RN  [facility]  Cardiology Care Coordinator-Heart Failure Clinic     Questions and [phone].   First press #1 for the [employer] and then press #3 for \"Medical Questions\" to reach us Cardiology Nurses.      On Call Cardiologist for after hours or on weekends: [phone]   option #4 and ask to speak to the on-call Cardiologist. Inform them you are a CORE/heart failure patient at the [facility].           If you need a medication refill please contact your pharmacy.  Please allow 3 business days for your refill to be completed.  _______________________________________________________  C.O.R.E. CLINIC Cardiomyopathy, Optimization, Rehabilitation, Education   The C.O.R.E. CLINIC is a heart failure specialty clinic within the [facility] where you will work with specialized nurse practitioners dedicated to helping patients with heart failure carefully adjust medications, receive education, and learn who and when to call if symptoms develop. They specialize in helping you better understand your condition, slow the progression of your disease, improve the length and quality of your life, help you detect future heart problems before they become life threatening, and avoid hospitalizations.  As always, thank you for trusting us with your health care needs!  Coronary Angiogram  The catheter can be placed into the groin, arm, or wrist. "   Angiography is a special type of X-ray that lets your doctor view your coronary arteries to see if the blood vessels to your heart are narrowed or blocked.    Follow these instructions:     I will call you when your angiogram is scheduled.     When you arrive you will be escorted back to the pre procedure area called 2A. Here they will insert an IV, draw labs, and obtain a short medical history. Please bring an updated list of your current medications.    A physician will come and talk with you about the procedure and obtain consent.    A nurse from the Cardiac Catheterization Lab will then escort you to the procedure area. You will be receiving sedation during the procedure so you will need someone to drive you to and from the hospital.    After the procedure you will recover for a short period (2 - 6 hours). You will be discharged with instructions for post procedure care. However, depending on what the angiogram shows you may have to have stents placed and this might require an overnight stay. We ask that you bring a small bag of necessities for your comfort if you would need to stay overnight. DO NOT BRING ANY VALUABLES!      Pre procedure instructions:  1. You will need a preop physical (H&P) done by your PCP within 30 days prior to the procedure. Please bring a hard copy of this with you to your procedure OR have your PCP fax to: [phone].  2. Make arrangements to have a  as you will not be allowed to drive following the procedure. Someone should stay with you the night after your procedure.  3.  Do not eat any solid food or milk products for 8 hours prior to the exam. You may drink clear liquids until 2 hours prior to the exam. Clear liquids include the following: water, Jell-O, clear broth, apple juice or any non-carbonated drink that you can see through (no pop!).   4. Do not drink alcohol or smoke 24 hours prior to test.  5. Hold these meds:  Do not take your oral diabetic medication or short  acting insulin the day of the procedure. Do not take metformin the day of and for 2 days following, contact your PCP regarding glucose control during this time.  We will call you about holding anticoagulants (Warfarin, etc)                   Follow-ups after your visit        Your next 10 appointments already scheduled     Feb 09, 2018 12:30 PM CST   Lab with UC LAB   [facility] ([facility])    [address] [place] [address]  1st [facility] [id number] [phone]            Feb 09, 2018  1:00 PM CST   (Arrive by 12:45 PM)   CORE RETURN with OFELIA Rivera CNP   [facility] ([facility])    [address] [place] [address]  [address]  [facility] [id number] [phone]            Feb 16, 2018 10:30 AM CST   LAB with ANAYA LAB   [facility] ([facility])    [address] [place] [address] [address]  [facility] [id number] [phone]           Please do not eat 10-12 hours before your appointment if you are coming in fasting for labs on lipids, cholesterol, or glucose (sugar). This does not apply to pregnant women. Water, hot tea and black coffee (with nothing added) are okay. Do not drink other fluids, diet soda or chew gum.            Feb 26, 2018  9:00 AM CST   (Arrive by 8:45 AM)   Return Visit with Clarisse Valdes MD   [facility] ([facility])    [address] [place] [address]  [address]  [facility] [id number] [phone]            Apr 16, 2018 10:00 AM CDT   (Arrive by 9:45 AM)   CT THORACIC SPINE W/O CONTRAST with UCCT1   [facility] ([facility])    [address] [place] [address]  1st [facility] [id number] [phone]           Please bring any scans or X-rays taken at other hospitals, if similar tests were done. Also bring a list of your medicines, including vitamins, minerals and over-the-counter drugs. It is safest to leave personal  items at home.  Be sure to tell your doctor:   If you have any allergies.   If there s any chance you are pregnant.   If you are breastfeeding.   If you have any special needs.  You do not need to do anything special to prepare.  Please wear loose clothing, such as a sweat suit or jogging clothes. Avoid snaps, zippers and other metal. We may ask you to undress and put on a hospital gown.            Apr 16, 2018 10:30 AM CDT   XR THORACIC/LUMBAR STANDING 2 VIEWS (SCOLI) with UCXR1   [facility] ([facility])    [address] [id number] [phone]           Please bring a list of your current medicines to your exam. (Include vitamins, minerals and over-thecounter medicines.) Leave your valuables at home.  Tell your doctor if there is a chance you may be pregnant.  You do not need to do anything special for this exam.            Apr 16, 2018 11:15 AM CDT   (Arrive by 11:00 AM)   Return Visit with Marcelo Trent MD   [facility] ([facility])    [address] [place] [id number] [phone]              Future tests that were ordered for you today     Open Future Orders        Priority Expected Expires Ordered    Basic metabolic panel Routine 2/16/2018 2/9/2019 2/9/2018            Who to contact     If you have questions or need follow up information about today's clinic visit or your schedule please contact [facility] directly at [phone].  Normal or non-critical lab and imaging results will be communicated to you by MyChart, letter or phone within 4 business days after the clinic has received the results. If you do not hear from us within 7 days, please contact the clinic through [facility] or phone. If you have a critical or abnormal lab result, we will notify you by phone as soon as possible.  Submit refill requests through [employer] or call your pharmacy and they  will forward the refill request to us. Please allow 3 business days for your refill to be completed.          Additional Information About Your Visit        [facility] Information     [facility] gives you secure access to your electronic health record. If you see a primary care provider, you can also send messages to your care team and make appointments. If you have questions, please call your primary care clinic.  If you do not have a primary care provider, please call [phone] and they will assist you.        Care EveryWhere ID     This is your Care EveryWhere ID. This could be used by other organizations to access your [facility] medical records  [id number]        Your Vitals Were     Pulse Height Pulse Oximetry BMI (Body Mass Index)          76 1.829 m (6') 97% 28.49 kg/m2         Blood Pressure from Last 3 Encounters:   02/09/18 127/74   02/05/18 124/74   10/30/17 110/64    Weight from Last 3 Encounters:   02/09/18 95.3 kg (210 lb 1.6 oz)   02/05/18 97.7 kg (215 lb 6.4 oz)   10/30/17 85.2 kg (187 lb 14.4 oz)              We Performed the Following     Follow-Up with Cardiac Advanced Practice Provider          Today's Medication Changes          These changes are accurate as of 2/9/18 11:45 AM.  If you have any questions, ask your nurse or doctor.               These medicines have changed or have updated prescriptions.        Dose/Directions    furosemide 20 MG tablet   Commonly known as:  LASIX   This may have changed:    - how much to take  - how to take this  - when to take this  - additional instructions   Used for:  Chronic systolic heart failure (H)   Changed by:  Sugey Levi APRN CNP        Furosemide 40 mg po in AM and 20 mg in the afternoon   Quantity:  180 tablet   Refills:  3       lisinopril 10 MG tablet   Commonly known as:  PRINIVIL/ZESTRIL   This may have changed:  Another medication with the same name was removed. Continue taking this medication, and follow the directions you see here.    Changed by:  Sugey Levi APRN CNP        Dose:  10 mg   Take 1 tablet (10 mg) by mouth daily   Quantity:  30 tablet   Refills:  0         Stop taking these medicines if you haven't already. Please contact your care team if you have questions.     benzonatate 100 MG capsule   Commonly known as:  TESSALON   Stopped by:  Sugey Levi APRN CNP                    Primary Care Provider Office Phone # Fax #    Gene Guevara [phone]       [place] [address]        Equal Access to Services     [facility]: Hadii liana cerrato hadasho Soomaali, waaxda luqadaha, qaybta kaalmada adecarlosyajaki, deandre howard . So [facility] [phone].    ATENCIÓN: Si habla español, tiene a noe disposición servicios gratuitos de asistencia lingüística. Ll[facility] [phone].    We comply with applicable federal civil rights laws and [place] laws. We do not discriminate on the basis of race, color, national origin, age, disability, sex, sexual orientation, or gender identity.            Thank you!     Thank you for choosing [facility]  for your care. Our goal is always to provide you with excellent care. Hearing back from our patients is one way we can continue to improve our services. Please take a few minutes to complete the written survey that you may receive in the mail after your visit with us. Thank you!             Your Updated Medication List - Protect others around you: Learn how to safely use, store and throw away your medicines at www.disposemymeds.org.          This list is accurate as of 2/9/18 11:45 AM.  Always use your most recent med list.                   Brand Name Dispense Instructions for use Diagnosis    acetaminophen 325 MG tablet    TYLENOL     Take 325 mg by mouth every 4 hours as needed for mild pain        ALPRAZolam 0.25 MG tablet    XANAX     TK 1 T PO QHS PRN    Epidural abscess       aspirin 81 MG tablet       Take 1 tablet (81 mg) by mouth daily        atorvastatin 40 MG tablet    LIPITOR    30 tablet    Take 1 tablet (40 mg) by mouth daily    Claudication of left lower extremity (H)       CELEXA PO      Take 10 mg by mouth daily        cholecalciferol 1000 UNITS Tabs     30 tablet    Take 1,000 Units by mouth daily    Loop diuretic causing adverse effect in therapeutic use, subsequent encounter       furosemide 20 MG tablet    LASIX    180 tablet    Furosemide 40 mg po in AM and 20 mg in the afternoon    Chronic systolic heart failure (H)       lisinopril 10 MG tablet    PRINIVIL/ZESTRIL    30 tablet    Take 1 tablet (10 mg) by mouth daily        MELATONIN PO      Take 5 mg by mouth daily        metFORMIN 500 MG tablet    GLUCOPHAGE    60 tablet    Take 1 tablet (500 mg) by mouth 2 times daily (with meals)    Type 2 diabetes mellitus with diabetic polyneuropathy, without long-term current use of insulin (H)       metoprolol succinate 200 MG 24 hr tablet    TOPROL-XL    90 tablet    Take 1 tablet (200 mg) by mouth daily    Chronic systolic heart failure (H)       nitroGLYcerin 0.4 MG sublingual tablet    NITROSTAT    25 tablet    For chest pain place 1 tablet under the tongue every 5 minutes for 3 doses. If symptoms persist 5 minutes after 1st dose call 911.    Atrial flutter, unspecified type (H)       nystatin cream    MYCOSTATIN     Apply topically 2 times daily        order for DME     1 Device    Equipment being ordered: [facility] shoe    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), Diabetic ulcer of left foot due to type 2 diabetes mellitus (H)       ranitidine 150 MG tablet    ZANTAC    60 tablet    Take 1 tablet (150 mg) by mouth 2 times daily    Gastroesophageal reflux disease without esophagitis       spironolactone 25 MG tablet    ALDACTONE    45 tablet    Take 0.5 tablets (12.5 mg) by mouth daily    Chronic systolic congestive heart failure (H)       tamsulosin 0.4 MG capsule    FLOMAX    60 capsule     Take 2 capsules (0.8 mg) by mouth At Bedtime    Renal insufficiency       warfarin 5 MG tablet    COUMADIN    90 tablet    Take 1 tablet (5 mg) by mouth daily Dose adjusted per Coumadin Clinic.    Atrial fibrillation (H)

## 2018-02-09 NOTE — PROGRESS NOTES
ANTICOAGULATION FOLLOW-UP CLINIC VISIT    Patient Name:  Tad Manning  Date:  2/9/2018  Contact Type:  Telephone    SUBJECTIVE:     Patient Findings     Comments INR rising nicely.           OBJECTIVE    INR   Date Value Ref Range Status   02/09/2018 1.40 (H) 0.86 - 1.14 Final       ASSESSMENT / PLAN  INR assessment SUB    Recheck INR In: 3 DAYS    INR Location Clinic      Anticoagulation Summary as of 2/9/2018     INR goal 2.0-3.0   Today's INR 1.40!   Maintenance plan No maintenance plan   Full instructions 2/9: 7.5 mg; 2/10: 7.5 mg; 2/11: 5 mg; Otherwise No maintenance plan   Next INR check 2/12/2018   Target end date Indefinite    Indications   Atrial fibrillation (H) [I48.91] [I48.91]  Long-term (current) use of anticoagulants [Z79.01] [Z79.01]         Anticoagulation Episode Summary     INR check location     Preferred lab     Send INR reminders to [facility] CLINIC    Comments       Anticoagulation Care Providers     Provider Role Specialty Phone number    Clarisse Valdes MD [facility] Cardiology [phone]            See the Encounter Report to view Anticoagulation Flowsheet and Dosing Calendar (Go to Encounters tab in chart review, and find the Anticoagulation Therapy Visit)    Spoke with Tad and left a message on cell phone for reinforcement.    Vicki Heanr RN

## 2018-02-09 NOTE — PROGRESS NOTES
Called to see if a cath lab date of Monday 2/26 would work for angiogram for pt. Left VM to call back. Also need to talk to him about Coumadin plan and let him know Sugey will call him Monday after she talks to Dr Valdes.     Vreónica Jarquin RN

## 2018-02-09 NOTE — PATIENT INSTRUCTIONS
You were seen today in the Cardiovascular Clinic at the [facility].     Cardiology Providers you saw during your visit: Sugey Levi NP     1. Increase Lisinopril to 10 mg daily  2. Increase Lasix to 40 mg in the morning, 20 mg in the afternoon  3. Repeat Basic Metabolic Panel in one week.   4. Appointment for R/L Heart Cath (angiogram)  5. Schedule with Sugey after angiogram results.          Results for PETER BUTLER (MRN [id number]) as of 2/9/2018 10:27   Ref. Range 2/9/2018 09:34   Sodium Latest Ref Range: 133 - 144 mmol/L 138   Potassium Latest Ref Range: 3.4 - 5.3 mmol/L 4.1   Chloride Latest Ref Range: 94 - 109 mmol/L 103   Carbon Dioxide Latest Ref Range: 20 - 32 mmol/L 26   Urea Nitrogen Latest Ref Range: 7 - 30 mg/dL 22   Creatinine Latest Ref Range: 0.66 - 1.25 mg/dL 1.07   GFR Estimate Latest Ref Range: >60 mL/min/1.7m2 67   GFR Estimate If Black Latest Ref Range: >60 mL/min/1.7m2 81   Calcium Latest Ref Range: 8.5 - 10.1 mg/dL 8.7   Anion Gap Latest Ref Range: 3 - 14 mmol/L 9   N-Terminal Pro Bnp Latest Ref Range: 0 - 450 pg/mL 55431 (H)   Glucose Latest Ref Range: 70 - 99 mg/dL 131 (H)   WBC Latest Ref Range: 4.0 - 11.0 10e9/L 5.4   Hemoglobin Latest Ref Range: 13.3 - 17.7 g/dL 12.4 (L)   Hematocrit Latest Ref Range: 40.0 - 53.0 % 39.7 (L)   Platelet Count Latest Ref Range: 150 - 450 10e9/L 99 (L)   RBC Count Latest Ref Range: 4.4 - 5.9 10e12/L 4.31 (L)   MCV Latest Ref Range: 78 - 100 fl 92   MCH Latest Ref Range: 26.5 - 33.0 pg 28.8   MCHC Latest Ref Range: 31.5 - 36.5 g/dL 31.2 (L)   RDW Latest Ref Range: 10.0 - 15.0 % 16.7 (H)   INR Latest Ref Range: 0.86 - 1.14  1.40 (H)       Please limit your fluid intake to 2 L (64 ounces) daily.  2 Liters a day = 8.5 cups, or 72 ounces.  Please limit your salt intake to 2 grams a day or less.     If you gain 2# in 24 hours or 5# in one week call Verónica Jarquin RN so we can adjust your medications as needed over the phone.     Please  "feel free to call me with any questions or concerns.       Verónica Jarquin RN  [facility]  Cardiology Care Coordinator-Heart Failure Clinic     Questions and [phone].   First press #1 for the [employer] and then press #3 for \"Medical Questions\" to reach us Cardiology Nurses.      On Call Cardiologist for after hours or on weekends: [phone]   option #4 and ask to speak to the on-call Cardiologist. Inform them you are a CORE/heart failure patient at the [facility].           If you need a medication refill please contact your pharmacy.  Please allow 3 business days for your refill to be completed.  _______________________________________________________  C.O.R.E. CLINIC Cardiomyopathy, Optimization, Rehabilitation, Education   The C.O.R.E. CLINIC is a heart failure specialty clinic within the [facility] where you will work with specialized nurse practitioners dedicated to helping patients with heart failure carefully adjust medications, receive education, and learn who and when to call if symptoms develop. They specialize in helping you better understand your condition, slow the progression of your disease, improve the length and quality of your life, help you detect future heart problems before they become life threatening, and avoid hospitalizations.  As always, thank you for trusting us with your health care needs!  Coronary Angiogram  The catheter can be placed into the groin, arm, or wrist.   Angiography is a special type of X-ray that lets your doctor view your coronary arteries to see if the blood vessels to your heart are narrowed or blocked.    Follow these instructions:     I will call you when your angiogram is scheduled.     When you arrive you will be escorted back to the pre procedure area called 2A. Here they will insert an IV, draw labs, and obtain a short medical history. Please bring an updated list of your current " medications.    A physician will come and talk with you about the procedure and obtain consent.    A nurse from the Cardiac Catheterization Lab will then escort you to the procedure area. You will be receiving sedation during the procedure so you will need someone to drive you to and from the hospital.    After the procedure you will recover for a short period (2 - 6 hours). You will be discharged with instructions for post procedure care. However, depending on what the angiogram shows you may have to have stents placed and this might require an overnight stay. We ask that you bring a small bag of necessities for your comfort if you would need to stay overnight. DO NOT BRING ANY VALUABLES!      Pre procedure instructions:  1. You will need a preop physical (H&P) done by your PCP within 30 days prior to the procedure. Please bring a hard copy of this with you to your procedure OR have your PCP fax to: [phone].  2. Make arrangements to have a  as you will not be allowed to drive following the procedure. Someone should stay with you the night after your procedure.  3.  Do not eat any solid food or milk products for 8 hours prior to the exam. You may drink clear liquids until 2 hours prior to the exam. Clear liquids include the following: water, Jell-O, clear broth, apple juice or any non-carbonated drink that you can see through (no pop!).   4. Do not drink alcohol or smoke 24 hours prior to test.  5. Hold these meds:  Do not take your oral diabetic medication or short acting insulin the day of the procedure. Do not take metformin the day of and for 2 days following, contact your PCP regarding glucose control during this time.  We will call you about holding anticoagulants (Warfarin, etc)

## 2018-02-09 NOTE — NURSING NOTE
Chief Complaint   Patient presents with     Follow Up For     1 week return with labs prior      Vitals were taken and medications were reconciled.     Mary Carmen Logan MA    9:52 AM

## 2018-02-09 NOTE — PROGRESS NOTES
HPI:   Mr. Manning is a 77 year old male with a past medical history including CAD s/p CABG times three in 1995, ICM, atrial flutter, HTN, DM Type II, and Dyslipidemia. He presents to [facility] for follow up.     He was last seen by Dr. Valdes 2/5/18 with concern for decompensated SCHF and his Lasix was increased to 40 mg po daily. She recommended ischemic workup given Twave changes on EKG and worsening EF at 20% on echo, but her refused invasive diagnostics at that time. He agreed that if repeat Echo in 3 months is consistent with decline in EF to consider angiogram at that time.     He denies fever, chills, lightheadedness, dizziness, chest pain, palpitations, SOB, ALEXIS, PND, orthopnea, nausea, vomiting, diarrhea, hematochezia, melena, or LE edema. His weight is down at 210 lbs at home. He continues to follow a low sodium diet. He is able to walk about 200 feet prior to needing rest. He notes decreased appetite since 4/17.    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)      Coronary artery disease     triple bypass 1995     Diabetes mellitus (H)      Diabetic polyneuropathy (H)      History of blood transfusion      Hyperlipidaemia      Hypertension      Malignant neoplasm (H)     CLL     Noninflammatory pericardial effusion      Osteomyelitis of left foot (H)      PVD (peripheral vascular disease) (H)      Sebaceous cyst        FAMILY HISTORY:  Family History   Problem Relation Age of Onset     CANCER Mother      leukemia       SOCIAL HISTORY:  Social History     Social History     Marital status: Single     Spouse name: N/A     Number of children: N/A     Years of education: N/A     Social History Main Topics     Smoking status: Former Smoker     Packs/day: 2.00     Years:  30.00     Types: Cigarettes     Quit date: 6/19/1995     Smokeless tobacco: Never Used     Alcohol use Yes      Comment: 1 drink monthly     Drug use: No     Sexual activity: Not on file     Other Topics Concern     Not on file     Social History Narrative    Lives independently with SO. 2/9/2017        CURRENT MEDICATIONS:  Outpatient Medications Prior to Visit   Medication Sig Dispense Refill     warfarin (COUMADIN) 5 MG tablet Take 1 tablet (5 mg) by mouth daily Dose adjusted per [facility]. 90 tablet 3     aspirin 81 MG tablet Take 1 tablet (81 mg) by mouth daily       ALPRAZolam (XANAX) 0.25 MG tablet TK 1 T PO QHS PRN  1     acetaminophen (TYLENOL) 325 MG tablet Take 325 mg by mouth every 4 hours as needed for mild pain       metoprolol (TOPROL-XL) 200 MG 24 hr tablet Take 1 tablet (200 mg) by mouth daily 90 tablet 3     Citalopram Hydrobromide (CELEXA PO) Take 10 mg by mouth daily       spironolactone (ALDACTONE) 25 MG tablet Take 0.5 tablets (12.5 mg) by mouth daily 45 tablet 3     atorvastatin (LIPITOR) 40 MG tablet Take 1 tablet (40 mg) by mouth daily 30 tablet 1     metFORMIN (GLUCOPHAGE) 500 MG tablet Take 1 tablet (500 mg) by mouth 2 times daily (with meals) 60 tablet      cholecalciferol 1000 UNITS TABS Take 1,000 Units by mouth daily 30 tablet      tamsulosin (FLOMAX) 0.4 MG capsule Take 2 capsules (0.8 mg) by mouth At Bedtime 60 capsule      nitroglycerin (NITROSTAT) 0.4 MG sublingual tablet For chest pain place 1 tablet under the tongue every 5 minutes for 3 doses. If symptoms persist 5 minutes after 1st dose call 911. 25 tablet      order for DME Equipment being ordered: 2 shoe 1 Device 0     furosemide (LASIX) 20 MG tablet Take 2 tablets (40 mg) by mouth daily 180 tablet 3     lisinopril (PRINIVIL/ZESTRIL) 10 MG tablet TK 1 T PO D  1     lisinopril (PRINIVIL/ZESTRIL) 5 MG tablet Take 1 tablet (5 mg) by mouth daily 90 tablet 3     nystatin (MYCOSTATIN) cream Apply topically 2 times daily        ranitidine (ZANTAC) 150 MG tablet Take 1 tablet (150 mg) by mouth 2 times daily (Patient not taking: Reported on 2/9/2018) 60 tablet      benzonatate (TESSALON) 100 MG capsule Take 1 capsule (100 mg) by mouth 3 times daily as needed for cough (Patient not taking: Reported on 2/9/2018) 42 capsule      No facility-administered medications prior to visit.        ROS:   CONSTITUTIONAL: Denies fever, chills, and fatigue. He notes weight loss with increase in diuretics.   HEENT: Denies headache, vision changes, and changes in speech.   CV: Refer to HPI.   PULMONARY:Denies shortness of breath, cough, or previous TB exposure.   GI:Denies nausea, vomiting, diarrhea, and abdominal pain. Bowel movements are regular.   :Denies urinary alterations, dysuria, urinary frequency, hematuria, and abnormal drainage.   EXT:Denies lower extremity edema.   SKIN:Denies abnormal rashes or lesions.   MUSCULOSKELETAL:Denies upper or lower extremity weakness and pain.   NEUROLOGIC:Denies lightheadedness, dizziness, seizures, or upper or lower extremity paresthesia.     EXAM:  /74 (BP Location: Left arm, Patient Position: Chair, Cuff Size: Adult Regular)  Pulse 76  Ht 1.829 m (6')  Wt 95.3 kg (210 lb 1.6 oz)  SpO2 97%  BMI 28.49 kg/m2  GENERAL: Appears alert and oriented times three.   HEENT: Eye symmetrical and free of discharge bilaterally. Mucous membranes moist and without lesions.  NECK: Supple and without lymphadenopathy. JVD 10-12 cm.   CV: Irregular, controlled. S1S2 present without murmur, rub, or gallop.   RESPIRATORY: Respirations regular, even, and unlabored. Lungs CTA throughout.   GI: Soft and non distended with normoactive bowel sounds present in all quadrants. No tenderness, rebound, guarding. No organomegaly.   EXTREMITIES: Trace bilateral LE edema. 2+ bilateral pedal pulses.   NEUROLOGIC: Alert and orientated x 3. CN II-XII grossly intact. No focal deficits.   MUSCULOSKELETAL: No joint swelling or tenderness.    SKIN: No jaundice. No rashes or lesions.     Labs:  CBC RESULTS:  Lab Results   Component Value Date    WBC 5.4 2018    RBC 4.31 (L) 2018    HGB 12.4 (L) 2018    HCT 39.7 (L) 2018    MCV 92 2018    MCH 28.8 2018    MCHC 31.2 (L) 2018    RDW 16.7 (H) 2018    PLT 99 (L) 2018       CMP RESULTS:  Lab Results   Component Value Date     2018    POTASSIUM 4.1 2018    CHLORIDE 103 2018    CO2 26 2018    ANIONGAP 9 2018     (H) 2018    BUN 22 2018    CR 1.07 2018    GFRESTIMATED 67 2018    GFRESTBLACK 81 2018    JUSTINO 8.7 2018    BILITOTAL 0.2 2017    ALBUMIN 2.4 (L) 2017    ALKPHOS 154 (H) 2017    ALT 17 2017    AST 10 2017        INR RESULTS:  Lab Results   Component Value Date    INR 1.40 (H) 2018       Lab Results   Component Value Date    MAG 1.4 (L) 2017     Lab Results   Component Value Date    NTBNPI 26425 (H) 2017     Lab Results   Component Value Date    NTBNP 07063 (H) 2018       Diagnostics:  Recent Results (from the past 4320 hour(s))   ECHO COMPLETE WITH OPTISON    Narrative    [id number]  [id number]  [id number]  [id number]^EDWARD^FABY^ARJUN           [facility]  Diagnostic and Treamtent-3rd Floor  [address] [place] [address]     Name: PETER BUTLER  MRN: [id number]  : 1940  Study Date: 2018 11:21 AM  Age: 77 yrs  Gender: Male  [place] [facility] [place]  Reason For Study: Chronic systolic congestive heart failure  Ordering Physician: FABY LEON  Referring Physician: FABY LEON  Performed By: Jess Mcwilliams RDCS     BSA: 2.1 m2  Height: 72 in  Weight: 187 lb  BP: 110/64 mmHg  _____________________________________________________________________________  __        Procedure  Echocardiogram with two-dimensional, color and spectral Doppler performed.  Contrast  Optison. Optison (NDC #5695-5557-81) given intravenously. Patient was  given 4 ml mixture of 3 ml Optison and 6 ml saline. 5 ml wasted.  _____________________________________________________________________________  __        Interpretation Summary  Severe left ventricular dilation is present.  Biplane LV EF 20%.  Severe diffuse hypokinesis is present.  Mild to moderate right ventricular dilation is present.  Global right ventricular function is moderately to severely reduced.  Dilation of the inferior vena cava is present with abnormal respiratory  variation in diameter.  No pericardial effusion is present.  A left pleural effusion is present.  _____________________________________________________________________________  __        Left Ventricle  Left ventricular wall thickness is normal. Severe left ventricular dilation is  present. Biplane LV EF 20%. Diastolic function not assessed due to atrial  fibrillation. Severe diffuse hypokinesis is present.     Right Ventricle  Mild to moderate right ventricular dilation is present. Global right  ventricular function is moderately to severely reduced.     Atria  The right atria appears normal. Severe left atrial enlargement is present.     Mitral Valve  The mitral valve is normal. Mild mitral insufficiency is present.        Aortic Valve  Mild aortic valve sclerosis is present. Trace to mild aortic insufficiency is  present.     Tricuspid Valve  The tricuspid valve is normal. Trace tricuspid insufficiency is present. The  right ventricular systolic pressure is approximated at 24.3 mmHg plus the  right atrial pressure.     Pulmonic Valve  The pulmonic valve is normal. Trace pulmonic insufficiency is present.     Vessels  The aorta root is normal. The pulmonary artery cannot be assessed. Dilation of  the inferior vena cava is present with abnormal respiratory variation in  diameter.     Pericardium  No pericardial effusion is present.        Miscellaneous  A left pleural  effusion is present.  _____________________________________________________________________________  __  MMode/2D Measurements & Calculations     IVSd: 0.68 cm  LVIDd: 6.2 cm  LVIDs: 5.8 cm  LVPWd: 0.66 cm  FS: 5.2 %  EDV(Teich): 191.9 ml  ESV(Teich): 169.7 ml  LV mass(C)d: 157.5 grams  LV mass(C)dI: 76.1 grams/m2  asc Aorta Diam: 3.3 cm  LVOT diam: 2.6 cm  LVOT area: 5.2 cm2  LA Volume (BP): 109.0 ml  LA Volume Index (BP): 52.7 ml/m2  RWT: 0.21           Doppler Measurements & Calculations  MV E max alessia: 63.7 cm/sec  MV dec time: 0.12 sec  MR ERO: 0.03 cm2  MR volume: 4.3 ml  TR max alessia: 246.5 cm/sec  TR max P.3 mmHg  E/E' av.3  Lateral E/e': 8.7  Medial E/e': 19.9     _____________________________________________________________________________  __           Report approved by: Edward De La Cruz 2018 01:35 PM          Assessment and Plan:   Mr. Manning is a 77 year old male with a past medical history including CAD s/p CABG times three in , ICM, atrial flutter, HTN, DM Type II, and Dyslipidemia. He presents to [facility] for follow up and remains hypervolemic.     Chronic systolic heart failure secondary to ICM.   Stage C, NYHA Class II  ACEi/ARB Increase Lisinopril to 10 mg po daily   BB Toprol  mg po daily   Aldosterone antagonist Aldcatone 12.5 mg po daily.   SCD prophylaxis Optimize GDMT  Fluid status Hypervolemic. Increase Lasix to 40 mg in AM and 20 mg at HS. Repeat BMP in 1 week.   Sleep Apnea Evaluation: Please recommend sleep referral at next visit.     CAD. S/p CABG times three. Note EKG at appointment with Dr. Valdes notes Twave changes with recent reduction in EF to 20%.   - he is agreeable to angiogram at this time. Will obtain LHC and RHC in 2 weeks prior to his brother leaving [facility].   - Continue ASA, Toprol XL, and Lipitor.     Aflutter. CHADSVASC-4.   - Rate controlled on Toprol  mg po daily.   - he recently started Coumadin, but is agreeable to trying Xarelto. Discussed  with Dr. Leon who is agreeable. Will assess copay and change if he is agreeable.     Dyslipidemia.   - Continue Lipitor.     DM Type II, controlled. Hgb A1C 2/10/17 6.1.   - Management per PCP.   - Currently on Metformin.     Follow up BMP in 1 week. Follow up in [facility] following angiogram.       Sugey Levi  2/8/2018          CC  FABY LEON

## 2018-02-09 NOTE — PROGRESS NOTES
Attempted to get hold of Tad in afternoon to confirm angiogram date. Did not return my message.  Currently scheduled for 2/26 and needs to arrive at 8:00 for labs. Will follow up with him on Monday.

## 2018-02-09 NOTE — MR AVS SNAPSHOT
Tad Manning   2/9/2018   Anticoagulation Therapy Visit    Description:  77 year old male   Provider:  Vicki Hearn, RN   Department:  [facility]           INR as of 2/9/2018     Today's INR 1.40!      Anticoagulation Summary as of 2/9/2018     INR goal 2.0-3.0   Today's INR 1.40!   Full instructions 2/9: 7.5 mg; 2/10: 7.5 mg; 2/11: 5 mg; Otherwise No maintenance plan   Next INR check 2/12/2018    Indications   Atrial fibrillation (H) [I48.91] [I48.91]  Long-term (current) use of anticoagulants [Z79.01] [Z79.01]         February 2018 Details    Sun Mon Tue Wed Thu Fri Sat         1               2               3                 4               5               6               7               8               9      7.5 mg   See details      10      7.5 mg           11      5 mg         12            13               14               15               16               17                 18               19               20               21               22               23               24                 25               26               27               28                   Date Details   02/09 This INR check       Date of next INR:  2/12/2018         How to take your warfarin dose     To take:  5 mg Take 1 of the 5 mg tablets.    To take:  7.5 mg Take 1.5 of the 5 mg tablets.

## 2018-02-12 ENCOUNTER — CARE COORDINATION (OUTPATIENT)
Dept: CARDIOLOGY | Facility: CLINIC | Age: 78
End: 2018-02-12

## 2018-02-12 NOTE — PROGRESS NOTES
Talked to Tad to confirm appt for angiogram. Tad is ok with rescheduling his appointment with Dr Valdes for after his appointment. I will mail him information with details on his angiogram and plans for anticoagulation.   Verónica Jarquin RN

## 2018-02-15 ENCOUNTER — CARE COORDINATION (OUTPATIENT)
Dept: CARDIOLOGY | Facility: CLINIC | Age: 78
End: 2018-02-15

## 2018-02-15 DIAGNOSIS — I48.91 ATRIAL FIBRILLATION (H): Primary | ICD-10-CM

## 2018-02-16 ENCOUNTER — ANTICOAGULATION THERAPY VISIT (OUTPATIENT)
Dept: ANTICOAGULATION | Facility: CLINIC | Age: 78
End: 2018-02-16

## 2018-02-16 ENCOUNTER — CARE COORDINATION (OUTPATIENT)
Dept: CARDIOLOGY | Facility: CLINIC | Age: 78
End: 2018-02-16

## 2018-02-16 DIAGNOSIS — I48.91 ATRIAL FIBRILLATION (H): ICD-10-CM

## 2018-02-16 DIAGNOSIS — I50.22 CHRONIC SYSTOLIC HEART FAILURE (H): ICD-10-CM

## 2018-02-16 DIAGNOSIS — I48.20 CHRONIC ATRIAL FIBRILLATION (H): Primary | ICD-10-CM

## 2018-02-16 DIAGNOSIS — Z79.01 LONG-TERM (CURRENT) USE OF ANTICOAGULANTS: ICD-10-CM

## 2018-02-16 LAB
ANION GAP SERPL CALCULATED.3IONS-SCNC: 14.2 MMOL/L (ref 6–17)
BUN SERPL-MCNC: 28 MG/DL (ref 7–30)
CALCIUM SERPL-MCNC: 9.1 MG/DL (ref 8.5–10.5)
CHLORIDE SERPL-SCNC: 100 MMOL/L (ref 98–107)
CO2 SERPL-SCNC: 27 MMOL/L (ref 23–29)
CREAT SERPL-MCNC: 1.39 MG/DL (ref 0.7–1.3)
GFR SERPL CREATININE-BSD FRML MDRD: 50 ML/MIN/1.7M2
GLUCOSE SERPL-MCNC: 246 MG/DL (ref 70–105)
POTASSIUM SERPL-SCNC: 4.2 MMOL/L (ref 3.5–5.1)
SODIUM SERPL-SCNC: 137 MMOL/L (ref 136–145)

## 2018-02-16 PROCEDURE — 36415 COLL VENOUS BLD VENIPUNCTURE: CPT | Performed by: INTERNAL MEDICINE

## 2018-02-16 PROCEDURE — 80048 BASIC METABOLIC PNL TOTAL CA: CPT | Performed by: INTERNAL MEDICINE

## 2018-02-16 RX ORDER — FUROSEMIDE 20 MG
TABLET ORAL
Qty: 180 TABLET | Refills: 3 | Status: SHIPPED | OUTPATIENT
Start: 2018-02-16 | End: 2018-04-16

## 2018-02-16 NOTE — LETTER
February 16, 2018    Dear Tad,       Angiography is a special type of X-ray that lets your doctor view your coronary arteries to see if the blood vessels to your heart are narrowed or blocked.  Follow these instructions:     You are scheduled for a Coronary Angiogram on Monday February 26th at the [facility], [address] [place] [address], [place] [address]. You are to check in at the Gold Waiting Area at 8:00 AM.     When you arrive you will be escorted back to the pre procedure area called 2A. Here they will insert an IV, draw labs, and obtain a short medical history. Please bring an updated list of your current medications.    A physician will come and talk with you about the procedure and obtain consent.    A nurse from the Cardiac Catheterization Lab will then escort you to the procedure area. You will be receiving sedation during the procedure so you will need someone to drive you to and from the hospital.    After the procedure you will recover for a short period (2 - 6 hours). You will be discharged with instructions for post procedure care. However, depending on what the angiogram shows you may have to have stents placed and this might require an overnight stay. We ask that you bring a small bag of necessities for your comfort if you would need to stay overnight. DO NOT BRING ANY VALUABLES!      Pre procedure instructions:  1. You will need a preop physical (H&P) done by your PCP within 30 days prior to the procedure. Please bring a hard copy of this with you to your procedure OR have your PCP fax to: [phone].  2. Make arrangements to have a  as you will not be allowed to drive following the procedure. Someone should stay with you the night after your procedure.  3.  Do not eat any solid food or milk products for 8 hours prior to the exam. You may drink clear liquids until 2 hours prior to the exam. Clear liquids include the following: water, Jell-O, clear broth,  apple juice or any non-carbonated drink that you can see through (no pop!).   4. Do not drink alcohol or smoke 24 hours prior to test.        5. Hold these meds:  Do not take your oral diabetic medication or short acting insulin the day of the procedure. Do not take metformin the day of and for 2 days following, contact your PCP regarding glucose control during this time.  Hold your warfarin (5 days prior)   6. You may take your other morning medications as prescribed with a sip of water. If you currently take an aspirin, continue taking your same dose. If not, take a 325 mg aspirin the day before and the day of your procedure.      It was a pleasure to see you at your last clinic visit. Please do not hesitate to call me if you have any questions or concerns.    Sincerely,      Sugey Levi CNP

## 2018-02-16 NOTE — MR AVS SNAPSHOT
Tad Manning   2/16/2018   Anticoagulation Therapy Visit    Description:  77 year old male   Provider:  Zeinab Vargas, RN   Department:  [facility]           INR as of 2/16/2018     Today's INR No new INR was available at the time of this encounter.      Anticoagulation Summary as of 2/16/2018     INR goal 2.0-3.0   Today's INR No new INR was available at the time of this encounter.   Full instructions 2/16: 25 mg; 2/17: 25 mg; 2/18: 25 mg; Otherwise No maintenance plan   Next INR check 2/19/2018    Indications   Atrial fibrillation (H) [I48.91] [I48.91]  Long-term (current) use of anticoagulants [Z79.01] [Z79.01]         February 2018 Details    Sun Mon Tue Wed Thu Fri Sat         1               2               3                 4               5               6               7               8               9               10                 11               12               13               14               15               16      25 mg   See details      17      25 mg           18      25 mg         19            20               21               22               23               24                 25               26               27               28                   Date Details   02/16 This INR check       Date of next INR:  2/19/2018         How to take your warfarin dose     To take:  25 mg Take 5 of the 5 mg tablets.

## 2018-02-16 NOTE — LETTER
February 16, 2018    Dear Tad,       Angiography is a special type of X-ray that lets your doctor view your coronary arteries to see if the blood vessels to your heart are narrowed or blocked.  Follow these instructions:     You are scheduled for a Coronary Angiogram on Monday February 26th at the [facility], [address] [place] [address], [place] [address]. You are to check in at the Gold Waiting Area at 8:00 AM.     When you arrive you will be escorted back to the pre procedure area called 2A. Here they will insert an IV, draw labs, and obtain a short medical history. Please bring an updated list of your current medications.    A physician will come and talk with you about the procedure and obtain consent.    A nurse from the Cardiac Catheterization Lab will then escort you to the procedure area. You will be receiving sedation during the procedure so you will need someone to drive you to and from the hospital.    After the procedure you will recover for a short period (2 - 6 hours). You will be discharged with instructions for post procedure care. However, depending on what the angiogram shows you may have to have stents placed and this might require an overnight stay. We ask that you bring a small bag of necessities for your comfort if you would need to stay overnight. DO NOT BRING ANY VALUABLES!      Pre procedure instructions:  1. You will need a preop physical (H&P) done by your PCP within 30 days prior to the procedure. Please bring a hard copy of this with you to your procedure OR have your PCP fax to: [phone].  2. Make arrangements to have a  as you will not be allowed to drive following the procedure. Someone should stay with you the night after your procedure.  3.  Do not eat any solid food or milk products for 8 hours prior to the exam. You may drink clear liquids until 2 hours prior to the exam. Clear liquids include the following: water, Jell-O, clear broth,  apple juice or any non-carbonated drink that you can see through (no pop!).   4. Do not drink alcohol or smoke 24 hours prior to test.        5. Hold these meds:  Do not take your oral diabetic medication Metformin) or short acting insulin the day of the procedure. Do not take metformin the day of and for 2 days following, contact your PCP regarding glucose control during this time.  Hold your warfarin (5 days prior)   6. You may take your other morning medications as prescribed with a sip of water. If you currently take an aspirin, continue taking your same dose. If not, take a 325 mg aspirin the day before and the day of your procedure.      It was a pleasure to see you at your last clinic visit. Please do not hesitate to call me if you have any questions or concerns.    Sincerely,      Sugey Levi CNP

## 2018-02-16 NOTE — PROGRESS NOTES
Called Tad to confirm details of angiogram after getting clarification on anticoagulation. Per Sugey hold AC for 5 days prior. Updated information printed on letter and sent to Tad. He is getting a pre-op on 2/20. He had a question about getting a prior type and cross since he has antibodies for blood transfusions. Sugey checking on it and we will get back to him.

## 2018-02-16 NOTE — PROGRESS NOTES
ANTICOAGULATION FOLLOW-UP CLINIC VISIT    Patient Name:  Tad Manning  Date:  2/16/2018  Contact Type:  Telephone    SUBJECTIVE:     Patient Findings     Comments Pt is phoned on this date at the request of cardiology as the pt had called them inquiring about the next step in the INR process.  Writer speaks with pt several times today - he misunderstood directions re: getting an INR on 2/12.  Upon further discussion it is revealed pt does not have orders set up anywhere for the blood draw.  Faxed orders sent to [facility] today at his request.  Today, however, the pt has an appointment for labs @ [facility].  Writer enters orders for an INR to be done there, however the INR is not drawn.  This is explained to the pt later in the afternoon & he will get an INR on 2/19 @ [facility].  Pt has been dosing himself all week - the dosing calendar is updated.  Strong emphasis for pt to call us on 2/19 if he has not heard from us by later afternoon.  Basic education on warfarin is provided, including taking the medication later in the day vs the morning.            OBJECTIVE    INR   Date Value Ref Range Status   02/09/2018 1.40 (H) 0.86 - 1.14 Final       ASSESSMENT / PLAN  No question data found.  Anticoagulation Summary as of 2/16/2018     INR goal 2.0-3.0   Today's INR No new INR was available at the time of this encounter.   Maintenance plan No maintenance plan   Full instructions 2/16: 25 mg; 2/17: 25 mg; 2/18: 25 mg; Otherwise No maintenance plan   Next INR check 2/19/2018   Target end date Indefinite    Indications   Atrial fibrillation (H) [I48.91] [I48.91]  Long-term (current) use of anticoagulants [Z79.01] [Z79.01]         Anticoagulation Episode Summary     INR check location     Preferred lab     Send INR reminders to [facility]    Comments HIPPA Infor returned. OK to leave message on cell [phone]. OK to leave messages with Seferino Manning [phone]  labs @ [facility] - orders faxed  there 2/16.  fax: [phone]      Anticoagulation Care Providers     Provider Role Specialty Phone number    Clarisse Valdes MD Responsible Cardiology [phone]            See the Encounter Report to view Anticoagulation Flowsheet and Dosing Calendar (Go to Encounters tab in chart review, and find the Anticoagulation Therapy Visit)    See above notation    Zeinab Vargas RN     ADDENDUM from 2/19:  On 2/16 the dose of coumadin for the 16th, 17th & 18th was to be: 5 mg.  Dosing calendar is updated.  Filipe ARZOLA

## 2018-02-16 NOTE — PATIENT INSTRUCTIONS
Called Tad to update him on his labs. Per Sugey no need to do type and cross prior to angiogram.   BMP reviewed by Sugey Levi. Following orders given and communicated:  Date: 2/16/2018    Time of Call: 11:49 AM     Diagnosis:  Heart failure     [ TORB ] Ordering provider: Sugey Levi  Order: Decrease Lasix to 40 mg daily. Repeat BMP in one week.     Order received by: Verónica Jarquin RN     Please call us if you have weight gain of 2 lbs in a day or 5 lbs in a week, any increased swelling, or shortness of breath.

## 2018-02-19 ENCOUNTER — ANTICOAGULATION THERAPY VISIT (OUTPATIENT)
Dept: ANTICOAGULATION | Facility: CLINIC | Age: 78
End: 2018-02-19

## 2018-02-19 DIAGNOSIS — Z79.01 LONG-TERM (CURRENT) USE OF ANTICOAGULANTS: ICD-10-CM

## 2018-02-19 DIAGNOSIS — I48.91 ATRIAL FIBRILLATION (H): ICD-10-CM

## 2018-02-19 LAB — INR PPP: 2.2

## 2018-02-19 NOTE — PROGRESS NOTES
ANTICOAGULATION FOLLOW-UP CLINIC VISIT    Patient Name:  Tad Manning  Date:  2/19/2018  Contact Type:  Telephone    SUBJECTIVE:        OBJECTIVE    INR   Date Value Ref Range Status   02/19/2018 2.20  Final     Comment:     Outside Lab        ASSESSMENT / PLAN  INR assessment THER    Recheck INR In: 4 DAYS    INR Location Outside lab      Anticoagulation Summary as of 2/19/2018     INR goal 2.0-3.0   Today's INR 2.20   Maintenance plan No maintenance plan   Full instructions 2/19: 5 mg; 2/20: 5 mg; 2/21: 5 mg; 2/22: 5 mg; Otherwise No maintenance plan   Next INR check 2/23/2018   Priority INR   Target end date Indefinite    Indications   Atrial fibrillation (H) [I48.91] [I48.91]  Long-term (current) use of anticoagulants [Z79.01] [Z79.01]         Anticoagulation Episode Summary     INR check location     Preferred lab     Send INR reminders to [facility]    Comments HIPPA Infor returned. OK to leave message on cell [phone]. OK to leave messages with Seferino Manning [phone]  [place] [facility] [place].  fax: [phone]      Anticoagulation Care Providers     Provider Role Specialty Phone number    Clarisse Valdes MD Responsible Cardiology [phone]            See the Encounter Report to view Anticoagulation Flowsheet and Dosing Calendar (Go to Encounters tab in chart review, and find the Anticoagulation Therapy Visit)    Left message for patient with results and dosing recommendations. Asked patient to call back to report any missed doses, falls, signs and symptoms of bleeding or clotting, any changes in health, medication, or diet. Asked patient to call back with any questions or concerns.     Pt has an appt with Sugey Levi NP here at the  on Friday 2/23. Labs are already scheduled     Ellen Santizo RN

## 2018-02-19 NOTE — MR AVS SNAPSHOT
Tad Manning   2/19/2018   Anticoagulation Therapy Visit    Description:  77 year old male   Provider:  Ellen Santizo, RN   Department:  [facility]           INR as of 2/19/2018     Today's INR 2.20      Anticoagulation Summary as of 2/19/2018     INR goal 2.0-3.0   Today's INR 2.20   Full instructions 2/19: 5 mg; 2/20: 5 mg; 2/21: 5 mg; 2/22: 5 mg; Otherwise No maintenance plan   Next INR check 2/23/2018    Indications   Atrial fibrillation (H) [I48.91] [I48.91]  Long-term (current) use of anticoagulants [Z79.01] [Z79.01]         February 2018 Details    Sun Mon Tue Wed Thu Fri Sat         1               2               3                 4               5               6               7               8               9               10                 11               12               13               14               15               16               17                 18               19      5 mg   See details      20      5 mg         21      5 mg         22      5 mg         23            24                 25               26               27               28                   Date Details   02/19 This INR check       Date of next INR:  2/23/2018         How to take your warfarin dose     To take:  5 mg Take 1 of the 5 mg tablets.

## 2018-02-20 ENCOUNTER — TRANSFERRED RECORDS (OUTPATIENT)
Dept: HEALTH INFORMATION MANAGEMENT | Facility: CLINIC | Age: 78
End: 2018-02-20

## 2018-02-21 DIAGNOSIS — I25.10 CORONARY ARTERY DISEASE DUE TO LIPID RICH PLAQUE: Primary | ICD-10-CM

## 2018-02-21 DIAGNOSIS — I25.83 CORONARY ARTERY DISEASE DUE TO LIPID RICH PLAQUE: Primary | ICD-10-CM

## 2018-02-23 ENCOUNTER — ANTICOAGULATION THERAPY VISIT (OUTPATIENT)
Dept: ANTICOAGULATION | Facility: CLINIC | Age: 78
End: 2018-02-23

## 2018-02-23 DIAGNOSIS — Z79.01 LONG-TERM (CURRENT) USE OF ANTICOAGULANTS: ICD-10-CM

## 2018-02-23 DIAGNOSIS — I25.10 CORONARY ARTERY DISEASE DUE TO LIPID RICH PLAQUE: ICD-10-CM

## 2018-02-23 DIAGNOSIS — I50.22 CHRONIC SYSTOLIC CONGESTIVE HEART FAILURE (H): ICD-10-CM

## 2018-02-23 DIAGNOSIS — I48.20 CHRONIC ATRIAL FIBRILLATION (H): ICD-10-CM

## 2018-02-23 DIAGNOSIS — I50.22 CHRONIC SYSTOLIC HEART FAILURE (H): ICD-10-CM

## 2018-02-23 DIAGNOSIS — I25.83 CORONARY ARTERY DISEASE DUE TO LIPID RICH PLAQUE: ICD-10-CM

## 2018-02-23 DIAGNOSIS — I48.91 ATRIAL FIBRILLATION (H): ICD-10-CM

## 2018-02-23 LAB
ANION GAP SERPL CALCULATED.3IONS-SCNC: 6 MMOL/L (ref 3–14)
BUN SERPL-MCNC: 31 MG/DL (ref 7–30)
CALCIUM SERPL-MCNC: 9 MG/DL (ref 8.5–10.1)
CHLORIDE SERPL-SCNC: 105 MMOL/L (ref 94–109)
CO2 SERPL-SCNC: 26 MMOL/L (ref 20–32)
CREAT SERPL-MCNC: 1.21 MG/DL (ref 0.66–1.25)
ERYTHROCYTE [DISTWIDTH] IN BLOOD BY AUTOMATED COUNT: 17.2 % (ref 10–15)
GFR SERPL CREATININE-BSD FRML MDRD: 58 ML/MIN/1.7M2
GLUCOSE SERPL-MCNC: 179 MG/DL (ref 70–99)
HCT VFR BLD AUTO: 39.8 % (ref 40–53)
HGB BLD-MCNC: 11.9 G/DL (ref 13.3–17.7)
INR PPP: 3.59 (ref 0.86–1.14)
MCH RBC QN AUTO: 27.4 PG (ref 26.5–33)
MCHC RBC AUTO-ENTMCNC: 29.9 G/DL (ref 31.5–36.5)
MCV RBC AUTO: 92 FL (ref 78–100)
PLATELET # BLD AUTO: 141 10E9/L (ref 150–450)
POTASSIUM SERPL-SCNC: 4.8 MMOL/L (ref 3.4–5.3)
RBC # BLD AUTO: 4.35 10E12/L (ref 4.4–5.9)
SODIUM SERPL-SCNC: 137 MMOL/L (ref 133–144)
WBC # BLD AUTO: 5.2 10E9/L (ref 4–11)

## 2018-02-23 PROCEDURE — 36415 COLL VENOUS BLD VENIPUNCTURE: CPT | Performed by: NURSE PRACTITIONER

## 2018-02-23 PROCEDURE — 80048 BASIC METABOLIC PNL TOTAL CA: CPT | Performed by: NURSE PRACTITIONER

## 2018-02-23 PROCEDURE — 85027 COMPLETE CBC AUTOMATED: CPT | Performed by: NURSE PRACTITIONER

## 2018-02-23 PROCEDURE — 85610 PROTHROMBIN TIME: CPT | Performed by: NURSE PRACTITIONER

## 2018-02-23 NOTE — PROGRESS NOTES
ANTICOAGULATION FOLLOW-UP CLINIC VISIT    Patient Name:  Tad Manning  Date:  2/23/2018  Contact Type:  Telephone    SUBJECTIVE:     Patient Findings     Comments Patient is currently retaining fluid.  His weight is up about 6 pounds in the last couple of days.           OBJECTIVE    INR   Date Value Ref Range Status   02/23/2018 3.59 (H) 0.86 - 1.14 Final       ASSESSMENT / PLAN  INR assessment SUPRA    Recheck INR In: 4 DAYS    INR Location Clinic      Anticoagulation Summary as of 2/23/2018     INR goal 2.0-3.0   Today's INR 3.59!   Maintenance plan No maintenance plan   Full instructions 2/23: 2.5 mg; 2/24: 5 mg; 2/25: 5 mg; 2/26: 5 mg; Otherwise No maintenance plan   Next INR check 2/27/2018   Priority INR   Target end date Indefinite    Indications   Atrial fibrillation (H) [I48.91] [I48.91]  Long-term (current) use of anticoagulants [Z79.01] [Z79.01]         Anticoagulation Episode Summary     INR check location     Preferred lab     Send INR reminders to [facility]    Comments HIPPA Infor returned. OK to leave message on cell [phone]. OK to leave messages with Seferino Manning [phone]  labs @ [facility] faxed there 2/16.  fax: [phone]      Anticoagulation Care Providers     Provider Role Specialty Phone number    Clarisse Valdes MD Responsible Cardiology [phone]            See the Encounter Report to view Anticoagulation Flowsheet and Dosing Calendar (Go to Encounters tab in chart review, and find the Anticoagulation Therapy Visit)    Spoke with patient.    Vicki Hearn RN

## 2018-02-23 NOTE — MR AVS SNAPSHOT
Tad Manning   2/23/2018   Anticoagulation Therapy Visit    Description:  77 year old male   Provider:  Vicki Hearn, RN   Department:  [facility]           INR as of 2/23/2018     Today's INR 3.59!      Anticoagulation Summary as of 2/23/2018     INR goal 2.0-3.0   Today's INR 3.59!   Full instructions 2/23: 2.5 mg; 2/24: 5 mg; 2/25: 5 mg; 2/26: 5 mg; Otherwise No maintenance plan   Next INR check 2/27/2018    Indications   Atrial fibrillation (H) [I48.91] [I48.91]  Long-term (current) use of anticoagulants [Z79.01] [Z79.01]         February 2018 Details    Sun Mon Tue Wed Thu Fri Sat         1               2               3                 4               5               6               7               8               9               10                 11               12               13               14               15               16               17                 18               19               20               21               22               23      2.5 mg   See details      24      5 mg           25      5 mg         26      5 mg         27            28                   Date Details   02/23 This INR check       Date of next INR:  2/27/2018         How to take your warfarin dose     To take:  2.5 mg Take 0.5 of a 5 mg tablet.    To take:  5 mg Take 1 of the 5 mg tablets.

## 2018-02-27 ENCOUNTER — ANTICOAGULATION THERAPY VISIT (OUTPATIENT)
Dept: ANTICOAGULATION | Facility: CLINIC | Age: 78
End: 2018-02-27

## 2018-02-27 ENCOUNTER — CARE COORDINATION (OUTPATIENT)
Dept: CARDIOLOGY | Facility: CLINIC | Age: 78
End: 2018-02-27

## 2018-02-27 DIAGNOSIS — Z79.01 LONG-TERM (CURRENT) USE OF ANTICOAGULANTS: ICD-10-CM

## 2018-02-27 DIAGNOSIS — I48.20 CHRONIC ATRIAL FIBRILLATION (H): ICD-10-CM

## 2018-02-27 DIAGNOSIS — I48.0 PAROXYSMAL ATRIAL FIBRILLATION (H): ICD-10-CM

## 2018-02-27 LAB — INR BLD: 3.7 (ref 0.86–1.14)

## 2018-02-27 PROCEDURE — 36416 COLLJ CAPILLARY BLOOD SPEC: CPT | Performed by: INTERNAL MEDICINE

## 2018-02-27 PROCEDURE — 85610 PROTHROMBIN TIME: CPT | Mod: QW | Performed by: INTERNAL MEDICINE

## 2018-02-27 NOTE — PROGRESS NOTES
Tad's brother Seferino called triage to see if his angiogram could be pushed back as he is [place] of [place] at that time and worried about his recovery. Told Seferino I would call Tad and talk to him about that. Seferino also states Tad is short of breath. When I called Tad, he states that he wants to get this angiogram done and has transportation set up. He states his niece will be able to stay with him after procedure and agreeable to getting it done at that time. When asked how his breathing feels, he states he's been short of breath for awhile and it is unchanged since previous visit.   His weight since last clinic visit has been stable between 207-209. He is sleeping ok, not requiring extra pillows. Denies chest pain, lightheadedness.   Informed to please call us if he has weight gain of 2 lbs in a day or 5 lbs in a week, any increased swelling, or shortness of breath.

## 2018-02-27 NOTE — PROGRESS NOTES
ANTICOAGULATION FOLLOW-UP CLINIC VISIT    Patient Name:  Tad Manning  Date:  2/27/2018  Contact Type:  Telephone    SUBJECTIVE:     Patient Findings     Comments Pt reports he continues to retain fluid and writer encouraged pt to contact the Cardiology team about this. Looks like pt is scheduled for a Right Heart Cath on 3/6, but it might be rescheduled            OBJECTIVE    INR Point of Care   Date Value Ref Range Status   02/27/2018 3.7 (H) 0.86 - 1.14 Final     Comment:     This test is intended for monitoring Coumadin therapy.  Results are not   accurate in patients with prolonged INR due to factor deficiency.         ASSESSMENT / PLAN  INR assessment SUPRA    Recheck INR In: 3 DAYS    INR Location Clinic      Anticoagulation Summary as of 2/27/2018     INR goal 2.0-3.0   Today's INR 3.7!   Maintenance plan No maintenance plan   Full instructions 2/27: 2.5 mg; 2/28: 2.5 mg; 3/1: 5 mg; Otherwise No maintenance plan   Next INR check 3/2/2018   Priority INR   Target end date Indefinite    Indications   Atrial fibrillation (H) [I48.91] [I48.91]  Long-term (current) use of anticoagulants [Z79.01] [Z79.01]         Anticoagulation Episode Summary     INR check location     Preferred lab     Send INR reminders to [facility]    Comments HIPPA Infor returned. OK to leave message on cell [phone]. OK to leave messages with Seferino Manning [phone]  labs @ [facility] faxed there 2/16.  fax: [phone]      Anticoagulation Care Providers     Provider Role Specialty Phone number    Clarisse Valdes MD Responsible Cardiology [phone]            See the Encounter Report to view Anticoagulation Flowsheet and Dosing Calendar (Go to Encounters tab in chart review, and find the Anticoagulation Therapy Visit)    Spoke with patient. Gave them their lab results and new warfarin recommendation.  No changes in health, medication, or diet. No missed doses, no falls. No signs or symptoms of bleed  or clotting.     Ellen Santizo RN

## 2018-02-27 NOTE — MR AVS SNAPSHOT
Tad Tim Manning   2/27/2018   Anticoagulation Therapy Visit    Description:  77 year old male   Provider:  Ellen Santizo, RN   Department:  [facility]           INR as of 2/27/2018     Today's INR 3.7!      Anticoagulation Summary as of 2/27/2018     INR goal 2.0-3.0   Today's INR 3.7!   Full instructions 2/27: 2.5 mg; 2/28: 2.5 mg; 3/1: 5 mg; Otherwise No maintenance plan   Next INR check 3/2/2018    Indications   Atrial fibrillation (H) [I48.91] [I48.91]  Long-term (current) use of anticoagulants [Z79.01] [Z79.01]         February 2018 Details    Sun Mon Tue Wed Thu Fri Sat         1               2               3                 4               5               6               7               8               9               10                 11               12               13               14               15               16               17                 18               19               20               21               22               23               24                 25               26               27      2.5 mg   See details      28      2.5 mg             Date Details   02/27 This INR check               How to take your warfarin dose     To take:  2.5 mg Take 0.5 of a 5 mg tablet.           March 2018 Details    Sun Mon Tue Wed Thu Fri Sat         1      5 mg         2            3                 4               5               6               7               8               9               10                 11               12               13               14               15               16               17                 18               19               20               21               22               23               24                 25               26               27               28               29               30               31                Date Details   No additional details    Date of next INR:  3/2/2018         How to take your warfarin dose      To take:  5 mg Take 1 of the 5 mg tablets.

## 2018-02-27 NOTE — PROGRESS NOTES
Per Hima herman to communicate information with brother Seferino. Left Seferino a msg with update below and to call with questions.   Verónica Jarquin RN

## 2018-02-27 NOTE — PROGRESS NOTES
Patient brother Seferino calling again regarding patient surgery on 03/06/18. He would like to know what kind of care is needed after the procedure. Seferino is leaving to [place] and will not be back until around April 1st, 2018 and patient will be alone. Can the procedure be done after he comes back so he has someone here to help care for him? Request call on mobile at [phone]. Message forwarded to RNCC.

## 2018-03-02 ENCOUNTER — ANTICOAGULATION THERAPY VISIT (OUTPATIENT)
Dept: ANTICOAGULATION | Facility: CLINIC | Age: 78
End: 2018-03-02

## 2018-03-02 DIAGNOSIS — Z79.01 LONG-TERM (CURRENT) USE OF ANTICOAGULANTS: ICD-10-CM

## 2018-03-02 DIAGNOSIS — I48.0 PAROXYSMAL ATRIAL FIBRILLATION (H): ICD-10-CM

## 2018-03-02 DIAGNOSIS — I48.20 CHRONIC ATRIAL FIBRILLATION (H): ICD-10-CM

## 2018-03-02 LAB — INR BLD: 2.1 (ref 0.86–1.14)

## 2018-03-02 PROCEDURE — 36416 COLLJ CAPILLARY BLOOD SPEC: CPT | Performed by: INTERNAL MEDICINE

## 2018-03-02 PROCEDURE — 85610 PROTHROMBIN TIME: CPT | Mod: QW | Performed by: INTERNAL MEDICINE

## 2018-03-02 NOTE — PROGRESS NOTES
ANTICOAGULATION FOLLOW-UP CLINIC VISIT    Patient Name:  Tad Manning  Date:  3/2/2018  Contact Type:  Telephone    SUBJECTIVE:     Patient Findings     Comments Hima is scheduled for a RHC on 3/6/18.  He states he was instructed to start holding his coumadin 2/28/18 before this procedure           OBJECTIVE    INR Point of Care   Date Value Ref Range Status   03/02/2018 2.1 (H) 0.86 - 1.14 Final     Comment:     This test is intended for monitoring Coumadin therapy.  Results are not   accurate in patients with prolonged INR due to factor deficiency.         ASSESSMENT / PLAN  INR assessment THER    Recheck INR In: 4 DAYS    INR Location Clinic      Anticoagulation Summary as of 3/2/2018     INR goal 2.0-3.0   Today's INR 2.1   Maintenance plan No maintenance plan   Full instructions 3/2: Hold; 3/3: Hold; 3/4: Hold; 3/5: Hold; Otherwise No maintenance plan   Next INR check 3/6/2018   Priority INR   Target end date Indefinite    Indications   Atrial fibrillation (H) [I48.91] [I48.91]  Long-term (current) use of anticoagulants [Z79.01] [Z79.01]         Anticoagulation Episode Summary     INR check location     Preferred lab     Send INR reminders to [facility]    Comments HIPPA Infor returned. OK to leave message on cell [phone]. OK to leave messages with Seferino Manning [phone]  labs @ [facility] faxed there 2/16.  fax: [phone]      Anticoagulation Care Providers     Provider Role Specialty Phone number    Clarisse Valdes MD Responsible Cardiology [phone]            See the Encounter Report to view Anticoagulation Flowsheet and Dosing Calendar (Go to Encounters tab in chart review, and find the Anticoagulation Therapy Visit)    Spoke with Hima.  He reports he is scheduled for a RHC on 3/6/18 and was instructed to start holding his warfarin 2/28/18 for this.  He will check with his provider who does the procedure as to when it is safe to restart warfarin after the  procedure.  Sent page to Felicita Levi to verify holding warfarin plan.      Yoselin Hunter RN

## 2018-03-05 DIAGNOSIS — I25.10 CORONARY ARTERY DISEASE DUE TO LIPID RICH PLAQUE: Primary | ICD-10-CM

## 2018-03-05 DIAGNOSIS — I25.83 CORONARY ARTERY DISEASE DUE TO LIPID RICH PLAQUE: Primary | ICD-10-CM

## 2018-03-05 NOTE — PROGRESS NOTES
Reminder message sent to pt via [employer] re: cors/RHC scheduled for tomorrow. Included instructions and callback number should pt have questions.

## 2018-03-06 ENCOUNTER — APPOINTMENT (OUTPATIENT)
Dept: CARDIOLOGY | Facility: CLINIC | Age: 78
End: 2018-03-06
Attending: NURSE PRACTITIONER
Payer: MEDICARE

## 2018-03-06 ENCOUNTER — APPOINTMENT (OUTPATIENT)
Dept: MEDSURG UNIT | Facility: CLINIC | Age: 78
End: 2018-03-06
Attending: INTERNAL MEDICINE
Payer: MEDICARE

## 2018-03-06 ENCOUNTER — HOSPITAL ENCOUNTER (OUTPATIENT)
Facility: CLINIC | Age: 78
Discharge: HOME | End: 2018-03-07
Attending: INTERNAL MEDICINE | Admitting: INTERNAL MEDICINE
Payer: MEDICARE

## 2018-03-06 DIAGNOSIS — I25.10 CORONARY ARTERY DISEASE INVOLVING NATIVE CORONARY ARTERY OF NATIVE HEART WITHOUT ANGINA PECTORIS: ICD-10-CM

## 2018-03-06 DIAGNOSIS — Z98.61 STATUS POST PERCUTANEOUS TRANSLUMINAL CORONARY ANGIOPLASTY: Primary | ICD-10-CM

## 2018-03-06 DIAGNOSIS — I25.83 CORONARY ARTERY DISEASE DUE TO LIPID RICH PLAQUE: ICD-10-CM

## 2018-03-06 DIAGNOSIS — I25.10 CORONARY ARTERY DISEASE DUE TO LIPID RICH PLAQUE: ICD-10-CM

## 2018-03-06 DIAGNOSIS — I50.22 CHRONIC SYSTOLIC HEART FAILURE (H): ICD-10-CM

## 2018-03-06 LAB
ANION GAP SERPL CALCULATED.3IONS-SCNC: 9 MMOL/L (ref 3–14)
BUN SERPL-MCNC: 29 MG/DL (ref 7–30)
CALCIUM SERPL-MCNC: 9.1 MG/DL (ref 8.5–10.1)
CHLORIDE SERPL-SCNC: 106 MMOL/L (ref 94–109)
CO2 SERPL-SCNC: 25 MMOL/L (ref 20–32)
CREAT SERPL-MCNC: 1.05 MG/DL (ref 0.66–1.25)
ERYTHROCYTE [DISTWIDTH] IN BLOOD BY AUTOMATED COUNT: 18 % (ref 10–15)
GFR SERPL CREATININE-BSD FRML MDRD: 69 ML/MIN/1.7M2
GLUCOSE BLDC GLUCOMTR-MCNC: 163 MG/DL (ref 70–99)
GLUCOSE BLDC GLUCOMTR-MCNC: 174 MG/DL (ref 70–99)
GLUCOSE SERPL-MCNC: 171 MG/DL (ref 70–99)
HCT VFR BLD AUTO: 41.4 % (ref 40–53)
HGB BLD-MCNC: 12.5 G/DL (ref 13.3–17.7)
INR PPP: 1.2 (ref 0.86–1.14)
INTERPRETATION ECG - MUSE: NORMAL
KCT BLD-ACNC: 164 SEC (ref 75–150)
KCT BLD-ACNC: 245 SEC (ref 75–150)
KCT BLD-ACNC: 314 SEC (ref 75–150)
MCH RBC QN AUTO: 27.7 PG (ref 26.5–33)
MCHC RBC AUTO-ENTMCNC: 30.2 G/DL (ref 31.5–36.5)
MCV RBC AUTO: 92 FL (ref 78–100)
PLATELET # BLD AUTO: 107 10E9/L (ref 150–450)
POTASSIUM SERPL-SCNC: 4.2 MMOL/L (ref 3.4–5.3)
RBC # BLD AUTO: 4.51 10E12/L (ref 4.4–5.9)
SODIUM SERPL-SCNC: 140 MMOL/L (ref 133–144)
WBC # BLD AUTO: 5.1 10E9/L (ref 4–11)

## 2018-03-06 PROCEDURE — C1887 CATHETER, GUIDING: HCPCS

## 2018-03-06 PROCEDURE — 99153 MOD SED SAME PHYS/QHP EA: CPT

## 2018-03-06 PROCEDURE — 92928 PRQ TCAT PLMT NTRAC ST 1 LES: CPT | Mod: LD | Performed by: INTERNAL MEDICINE

## 2018-03-06 PROCEDURE — C1874 STENT, COATED/COV W/DEL SYS: HCPCS

## 2018-03-06 PROCEDURE — A9270 NON-COVERED ITEM OR SERVICE: HCPCS | Mod: GY | Performed by: NURSE PRACTITIONER

## 2018-03-06 PROCEDURE — 40000065 ZZH STATISTIC EKG NON-CHARGEABLE

## 2018-03-06 PROCEDURE — 27210787 ZZH MANIFOLD CR2

## 2018-03-06 PROCEDURE — 80048 BASIC METABOLIC PNL TOTAL CA: CPT | Performed by: INTERNAL MEDICINE

## 2018-03-06 PROCEDURE — C1769 GUIDE WIRE: HCPCS

## 2018-03-06 PROCEDURE — C9600 PERC DRUG-EL COR STENT SING: HCPCS

## 2018-03-06 PROCEDURE — 25000132 ZZH RX MED GY IP 250 OP 250 PS 637: Mod: GY | Performed by: INTERNAL MEDICINE

## 2018-03-06 PROCEDURE — 25000125 ZZHC RX 250: Performed by: INTERNAL MEDICINE

## 2018-03-06 PROCEDURE — 27211089 ZZH KIT ACIST INJECTOR CR3

## 2018-03-06 PROCEDURE — 93005 ELECTROCARDIOGRAM TRACING: CPT

## 2018-03-06 PROCEDURE — 93010 ELECTROCARDIOGRAM REPORT: CPT | Performed by: INTERNAL MEDICINE

## 2018-03-06 PROCEDURE — 93010 ELECTROCARDIOGRAM REPORT: CPT | Mod: 76 | Performed by: INTERNAL MEDICINE

## 2018-03-06 PROCEDURE — 85347 COAGULATION TIME ACTIVATED: CPT

## 2018-03-06 PROCEDURE — 27210742 ZZH CATH CR1

## 2018-03-06 PROCEDURE — C1894 INTRO/SHEATH, NON-LASER: HCPCS

## 2018-03-06 PROCEDURE — 85610 PROTHROMBIN TIME: CPT | Performed by: INTERNAL MEDICINE

## 2018-03-06 PROCEDURE — 93457 R HRT ART/GRFT ANGIO: CPT | Mod: XU

## 2018-03-06 PROCEDURE — 99152 MOD SED SAME PHYS/QHP 5/>YRS: CPT

## 2018-03-06 PROCEDURE — A9270 NON-COVERED ITEM OR SERVICE: HCPCS | Mod: GY | Performed by: INTERNAL MEDICINE

## 2018-03-06 PROCEDURE — 27210946 ZZH KIT HC TOTES DISP CR8

## 2018-03-06 PROCEDURE — 25000128 H RX IP 250 OP 636: Performed by: INTERNAL MEDICINE

## 2018-03-06 PROCEDURE — 27211181 ZZH BALLOON TIP PRESSURE CR5

## 2018-03-06 PROCEDURE — 82962 GLUCOSE BLOOD TEST: CPT

## 2018-03-06 PROCEDURE — 85027 COMPLETE CBC AUTOMATED: CPT | Performed by: INTERNAL MEDICINE

## 2018-03-06 PROCEDURE — C1725 CATH, TRANSLUMIN NON-LASER: HCPCS

## 2018-03-06 PROCEDURE — 93457 R HRT ART/GRFT ANGIO: CPT | Mod: 26 | Performed by: INTERNAL MEDICINE

## 2018-03-06 PROCEDURE — 40000172 ZZH STATISTIC PROCEDURE PREP ONLY

## 2018-03-06 PROCEDURE — 25000132 ZZH RX MED GY IP 250 OP 250 PS 637: Mod: GY | Performed by: NURSE PRACTITIONER

## 2018-03-06 PROCEDURE — 99152 MOD SED SAME PHYS/QHP 5/>YRS: CPT | Performed by: INTERNAL MEDICINE

## 2018-03-06 RX ORDER — CLOPIDOGREL BISULFATE 75 MG/1
75 TABLET ORAL DAILY
Qty: 90 TABLET | Refills: 3 | Status: SHIPPED | OUTPATIENT
Start: 2018-03-07 | End: 2019-03-05

## 2018-03-06 RX ORDER — SPIRONOLACTONE 25 MG
12.5 TABLET ORAL DAILY
Status: DISCONTINUED | OUTPATIENT
Start: 2018-03-07 | End: 2018-03-07 | Stop reason: HOSPADM

## 2018-03-06 RX ORDER — CITALOPRAM HYDROBROMIDE 10 MG/1
10 TABLET ORAL DAILY
Status: DISCONTINUED | OUTPATIENT
Start: 2018-03-07 | End: 2018-03-07 | Stop reason: HOSPADM

## 2018-03-06 RX ORDER — HYDROCODONE BITARTRATE AND ACETAMINOPHEN 5; 325 MG/1; MG/1
1-2 TABLET ORAL EVERY 4 HOURS PRN
Status: DISCONTINUED | OUTPATIENT
Start: 2018-03-06 | End: 2018-03-07 | Stop reason: HOSPADM

## 2018-03-06 RX ORDER — LIDOCAINE HYDROCHLORIDE 10 MG/ML
30 INJECTION, SOLUTION EPIDURAL; INFILTRATION; INTRACAUDAL; PERINEURAL
Status: DISCONTINUED | OUTPATIENT
Start: 2018-03-06 | End: 2018-03-06 | Stop reason: HOSPADM

## 2018-03-06 RX ORDER — ALPRAZOLAM 0.25 MG
0.25 TABLET ORAL EVERY 12 HOURS PRN
Status: DISCONTINUED | OUTPATIENT
Start: 2018-03-06 | End: 2018-03-07 | Stop reason: HOSPADM

## 2018-03-06 RX ORDER — PHENYLEPHRINE HCL IN 0.9% NACL 1 MG/10 ML
20-100 SYRINGE (ML) INTRAVENOUS
Status: DISCONTINUED | OUTPATIENT
Start: 2018-03-06 | End: 2018-03-06 | Stop reason: HOSPADM

## 2018-03-06 RX ORDER — HYDRALAZINE HYDROCHLORIDE 20 MG/ML
10-20 INJECTION INTRAMUSCULAR; INTRAVENOUS
Status: DISCONTINUED | OUTPATIENT
Start: 2018-03-06 | End: 2018-03-06 | Stop reason: HOSPADM

## 2018-03-06 RX ORDER — DEXTROSE MONOHYDRATE 25 G/50ML
12.5-5 INJECTION, SOLUTION INTRAVENOUS EVERY 30 MIN PRN
Status: DISCONTINUED | OUTPATIENT
Start: 2018-03-06 | End: 2018-03-06 | Stop reason: HOSPADM

## 2018-03-06 RX ORDER — DOBUTAMINE HYDROCHLORIDE 200 MG/100ML
2-20 INJECTION INTRAVENOUS CONTINUOUS PRN
Status: DISCONTINUED | OUTPATIENT
Start: 2018-03-06 | End: 2018-03-06 | Stop reason: HOSPADM

## 2018-03-06 RX ORDER — IOPAMIDOL 755 MG/ML
127 INJECTION, SOLUTION INTRAVASCULAR ONCE
Status: COMPLETED | OUTPATIENT
Start: 2018-03-06 | End: 2018-03-06

## 2018-03-06 RX ORDER — ENALAPRILAT 1.25 MG/ML
1.25-2.5 INJECTION INTRAVENOUS
Status: DISCONTINUED | OUTPATIENT
Start: 2018-03-06 | End: 2018-03-06 | Stop reason: HOSPADM

## 2018-03-06 RX ORDER — ALPRAZOLAM 0.25 MG
0.25 TABLET ORAL
Status: DISCONTINUED | OUTPATIENT
Start: 2018-03-06 | End: 2018-03-06

## 2018-03-06 RX ORDER — DOPAMINE HYDROCHLORIDE 160 MG/100ML
2-20 INJECTION, SOLUTION INTRAVENOUS CONTINUOUS PRN
Status: DISCONTINUED | OUTPATIENT
Start: 2018-03-06 | End: 2018-03-06 | Stop reason: HOSPADM

## 2018-03-06 RX ORDER — DIPHENHYDRAMINE HYDROCHLORIDE 50 MG/ML
25-50 INJECTION INTRAMUSCULAR; INTRAVENOUS
Status: DISCONTINUED | OUTPATIENT
Start: 2018-03-06 | End: 2018-03-06 | Stop reason: HOSPADM

## 2018-03-06 RX ORDER — NITROGLYCERIN 20 MG/100ML
.07-2 INJECTION INTRAVENOUS CONTINUOUS PRN
Status: DISCONTINUED | OUTPATIENT
Start: 2018-03-06 | End: 2018-03-06 | Stop reason: HOSPADM

## 2018-03-06 RX ORDER — PROTAMINE SULFATE 10 MG/ML
1-5 INJECTION, SOLUTION INTRAVENOUS
Status: DISCONTINUED | OUTPATIENT
Start: 2018-03-06 | End: 2018-03-06 | Stop reason: HOSPADM

## 2018-03-06 RX ORDER — LISINOPRIL 10 MG/1
10 TABLET ORAL DAILY
Status: DISCONTINUED | OUTPATIENT
Start: 2018-03-07 | End: 2018-03-07 | Stop reason: HOSPADM

## 2018-03-06 RX ORDER — FUROSEMIDE 10 MG/ML
20-100 INJECTION INTRAMUSCULAR; INTRAVENOUS
Status: COMPLETED | OUTPATIENT
Start: 2018-03-06 | End: 2018-03-06

## 2018-03-06 RX ORDER — NICARDIPINE HYDROCHLORIDE 2.5 MG/ML
100 INJECTION INTRAVENOUS
Status: DISCONTINUED | OUTPATIENT
Start: 2018-03-06 | End: 2018-03-06 | Stop reason: HOSPADM

## 2018-03-06 RX ORDER — ASPIRIN 325 MG
325 TABLET ORAL
Status: DISCONTINUED | OUTPATIENT
Start: 2018-03-06 | End: 2018-03-06 | Stop reason: HOSPADM

## 2018-03-06 RX ORDER — FENTANYL CITRATE 50 UG/ML
25-50 INJECTION, SOLUTION INTRAMUSCULAR; INTRAVENOUS
Status: DISCONTINUED | OUTPATIENT
Start: 2018-03-06 | End: 2018-03-06 | Stop reason: HOSPADM

## 2018-03-06 RX ORDER — ASPIRIN 81 MG/1
81 TABLET ORAL DAILY
Status: DISCONTINUED | OUTPATIENT
Start: 2018-03-07 | End: 2018-03-07 | Stop reason: HOSPADM

## 2018-03-06 RX ORDER — PRASUGREL 10 MG/1
10-60 TABLET, FILM COATED ORAL
Status: DISCONTINUED | OUTPATIENT
Start: 2018-03-06 | End: 2018-03-06 | Stop reason: HOSPADM

## 2018-03-06 RX ORDER — POTASSIUM CHLORIDE 29.8 MG/ML
20 INJECTION INTRAVENOUS
Status: DISCONTINUED | OUTPATIENT
Start: 2018-03-06 | End: 2018-03-06 | Stop reason: HOSPADM

## 2018-03-06 RX ORDER — FLUMAZENIL 0.1 MG/ML
0.2 INJECTION, SOLUTION INTRAVENOUS
Status: ACTIVE | OUTPATIENT
Start: 2018-03-06 | End: 2018-03-06

## 2018-03-06 RX ORDER — ATROPINE SULFATE 0.1 MG/ML
0.5 INJECTION INTRAVENOUS EVERY 5 MIN PRN
Status: DISPENSED | OUTPATIENT
Start: 2018-03-06 | End: 2018-03-06

## 2018-03-06 RX ORDER — LORAZEPAM 2 MG/ML
.5-2 INJECTION INTRAMUSCULAR
Status: DISCONTINUED | OUTPATIENT
Start: 2018-03-06 | End: 2018-03-06 | Stop reason: HOSPADM

## 2018-03-06 RX ORDER — NALOXONE HYDROCHLORIDE 0.4 MG/ML
0.4 INJECTION, SOLUTION INTRAMUSCULAR; INTRAVENOUS; SUBCUTANEOUS EVERY 5 MIN PRN
Status: DISCONTINUED | OUTPATIENT
Start: 2018-03-06 | End: 2018-03-06 | Stop reason: HOSPADM

## 2018-03-06 RX ORDER — WARFARIN SODIUM 5 MG/1
5 TABLET ORAL
Status: COMPLETED | OUTPATIENT
Start: 2018-03-06 | End: 2018-03-06

## 2018-03-06 RX ORDER — ATROPINE SULFATE 0.1 MG/ML
.5-1 INJECTION INTRAVENOUS
Status: DISCONTINUED | OUTPATIENT
Start: 2018-03-06 | End: 2018-03-06 | Stop reason: HOSPADM

## 2018-03-06 RX ORDER — ARGATROBAN 1 MG/ML
350 INJECTION, SOLUTION INTRAVENOUS
Status: DISCONTINUED | OUTPATIENT
Start: 2018-03-06 | End: 2018-03-06 | Stop reason: HOSPADM

## 2018-03-06 RX ORDER — PROTAMINE SULFATE 10 MG/ML
25-100 INJECTION, SOLUTION INTRAVENOUS EVERY 5 MIN PRN
Status: DISCONTINUED | OUTPATIENT
Start: 2018-03-06 | End: 2018-03-06 | Stop reason: HOSPADM

## 2018-03-06 RX ORDER — METOPROLOL TARTRATE 1 MG/ML
5 INJECTION, SOLUTION INTRAVENOUS EVERY 5 MIN PRN
Status: DISCONTINUED | OUTPATIENT
Start: 2018-03-06 | End: 2018-03-06 | Stop reason: HOSPADM

## 2018-03-06 RX ORDER — NITROGLYCERIN 0.4 MG/1
0.4 TABLET SUBLINGUAL EVERY 5 MIN PRN
Status: DISCONTINUED | OUTPATIENT
Start: 2018-03-06 | End: 2018-03-06 | Stop reason: HOSPADM

## 2018-03-06 RX ORDER — ADENOSINE 3 MG/ML
12-12000 INJECTION, SOLUTION INTRAVENOUS
Status: DISCONTINUED | OUTPATIENT
Start: 2018-03-06 | End: 2018-03-06 | Stop reason: HOSPADM

## 2018-03-06 RX ORDER — VERAPAMIL HYDROCHLORIDE 2.5 MG/ML
1-5 INJECTION, SOLUTION INTRAVENOUS
Status: DISCONTINUED | OUTPATIENT
Start: 2018-03-06 | End: 2018-03-06 | Stop reason: HOSPADM

## 2018-03-06 RX ORDER — NIFEDIPINE 10 MG/1
10 CAPSULE ORAL
Status: DISCONTINUED | OUTPATIENT
Start: 2018-03-06 | End: 2018-03-06 | Stop reason: HOSPADM

## 2018-03-06 RX ORDER — CLOPIDOGREL BISULFATE 75 MG/1
75 TABLET ORAL DAILY
Status: DISCONTINUED | OUTPATIENT
Start: 2018-03-07 | End: 2018-03-07 | Stop reason: HOSPADM

## 2018-03-06 RX ORDER — FUROSEMIDE 40 MG
40 TABLET ORAL DAILY
Status: DISCONTINUED | OUTPATIENT
Start: 2018-03-07 | End: 2018-03-07 | Stop reason: HOSPADM

## 2018-03-06 RX ORDER — METHYLPREDNISOLONE SODIUM SUCCINATE 125 MG/2ML
125 INJECTION, POWDER, LYOPHILIZED, FOR SOLUTION INTRAMUSCULAR; INTRAVENOUS
Status: DISCONTINUED | OUTPATIENT
Start: 2018-03-06 | End: 2018-03-06 | Stop reason: HOSPADM

## 2018-03-06 RX ORDER — POTASSIUM CHLORIDE 7.45 MG/ML
10 INJECTION INTRAVENOUS
Status: DISCONTINUED | OUTPATIENT
Start: 2018-03-06 | End: 2018-03-06 | Stop reason: HOSPADM

## 2018-03-06 RX ORDER — PROMETHAZINE HYDROCHLORIDE 25 MG/ML
6.25-25 INJECTION, SOLUTION INTRAMUSCULAR; INTRAVENOUS EVERY 4 HOURS PRN
Status: DISCONTINUED | OUTPATIENT
Start: 2018-03-06 | End: 2018-03-06 | Stop reason: HOSPADM

## 2018-03-06 RX ORDER — ARGATROBAN 1 MG/ML
150 INJECTION, SOLUTION INTRAVENOUS
Status: DISCONTINUED | OUTPATIENT
Start: 2018-03-06 | End: 2018-03-06 | Stop reason: HOSPADM

## 2018-03-06 RX ORDER — METOPROLOL SUCCINATE 100 MG/1
200 TABLET, EXTENDED RELEASE ORAL DAILY
Status: DISCONTINUED | OUTPATIENT
Start: 2018-03-07 | End: 2018-03-07 | Stop reason: HOSPADM

## 2018-03-06 RX ORDER — ACETAMINOPHEN 325 MG/1
325-650 TABLET ORAL EVERY 4 HOURS PRN
Status: DISCONTINUED | OUTPATIENT
Start: 2018-03-06 | End: 2018-03-07 | Stop reason: HOSPADM

## 2018-03-06 RX ORDER — NITROGLYCERIN 0.4 MG/1
0.4 TABLET SUBLINGUAL EVERY 5 MIN PRN
Status: DISCONTINUED | OUTPATIENT
Start: 2018-03-06 | End: 2018-03-07 | Stop reason: HOSPADM

## 2018-03-06 RX ORDER — HEPARIN SODIUM 1000 [USP'U]/ML
1000-10000 INJECTION, SOLUTION INTRAVENOUS; SUBCUTANEOUS EVERY 5 MIN PRN
Status: DISCONTINUED | OUTPATIENT
Start: 2018-03-06 | End: 2018-03-06 | Stop reason: HOSPADM

## 2018-03-06 RX ORDER — NALOXONE HYDROCHLORIDE 0.4 MG/ML
.1-.4 INJECTION, SOLUTION INTRAMUSCULAR; INTRAVENOUS; SUBCUTANEOUS
Status: DISCONTINUED | OUTPATIENT
Start: 2018-03-06 | End: 2018-03-07 | Stop reason: HOSPADM

## 2018-03-06 RX ORDER — ATORVASTATIN CALCIUM 40 MG/1
40 TABLET, FILM COATED ORAL
Status: DISCONTINUED | OUTPATIENT
Start: 2018-03-06 | End: 2018-03-07 | Stop reason: HOSPADM

## 2018-03-06 RX ORDER — LIDOCAINE 40 MG/G
CREAM TOPICAL
Status: DISCONTINUED | OUTPATIENT
Start: 2018-03-06 | End: 2018-03-07 | Stop reason: HOSPADM

## 2018-03-06 RX ORDER — FLUMAZENIL 0.1 MG/ML
0.2 INJECTION, SOLUTION INTRAVENOUS
Status: DISCONTINUED | OUTPATIENT
Start: 2018-03-06 | End: 2018-03-06 | Stop reason: HOSPADM

## 2018-03-06 RX ORDER — ASPIRIN 81 MG/1
81-324 TABLET, CHEWABLE ORAL
Status: DISCONTINUED | OUTPATIENT
Start: 2018-03-06 | End: 2018-03-06 | Stop reason: HOSPADM

## 2018-03-06 RX ORDER — NITROGLYCERIN 5 MG/ML
100-500 VIAL (ML) INTRAVENOUS
Status: COMPLETED | OUTPATIENT
Start: 2018-03-06 | End: 2018-03-06

## 2018-03-06 RX ORDER — NALOXONE HYDROCHLORIDE 0.4 MG/ML
.2-.4 INJECTION, SOLUTION INTRAMUSCULAR; INTRAVENOUS; SUBCUTANEOUS
Status: ACTIVE | OUTPATIENT
Start: 2018-03-06 | End: 2018-03-06

## 2018-03-06 RX ORDER — ONDANSETRON 2 MG/ML
4 INJECTION INTRAMUSCULAR; INTRAVENOUS EVERY 4 HOURS PRN
Status: DISCONTINUED | OUTPATIENT
Start: 2018-03-06 | End: 2018-03-06 | Stop reason: HOSPADM

## 2018-03-06 RX ORDER — FENTANYL CITRATE 50 UG/ML
25-50 INJECTION, SOLUTION INTRAMUSCULAR; INTRAVENOUS
Status: DISPENSED | OUTPATIENT
Start: 2018-03-06 | End: 2018-03-06

## 2018-03-06 RX ORDER — ONDANSETRON 2 MG/ML
4 INJECTION INTRAMUSCULAR; INTRAVENOUS EVERY 6 HOURS PRN
Status: DISCONTINUED | OUTPATIENT
Start: 2018-03-06 | End: 2018-03-07 | Stop reason: HOSPADM

## 2018-03-06 RX ORDER — CLOPIDOGREL BISULFATE 75 MG/1
75 TABLET ORAL
Status: DISCONTINUED | OUTPATIENT
Start: 2018-03-06 | End: 2018-03-06 | Stop reason: HOSPADM

## 2018-03-06 RX ORDER — SODIUM NITROPRUSSIDE 25 MG/ML
100-200 INJECTION INTRAVENOUS
Status: DISCONTINUED | OUTPATIENT
Start: 2018-03-06 | End: 2018-03-06 | Stop reason: HOSPADM

## 2018-03-06 RX ORDER — NITROGLYCERIN 5 MG/ML
100-200 VIAL (ML) INTRAVENOUS
Status: DISCONTINUED | OUTPATIENT
Start: 2018-03-06 | End: 2018-03-06 | Stop reason: HOSPADM

## 2018-03-06 RX ORDER — ONDANSETRON 4 MG/1
4 TABLET, ORALLY DISINTEGRATING ORAL EVERY 6 HOURS PRN
Status: DISCONTINUED | OUTPATIENT
Start: 2018-03-06 | End: 2018-03-07 | Stop reason: HOSPADM

## 2018-03-06 RX ORDER — MAGNESIUM HYDROXIDE 1200 MG/15ML
1000 LIQUID ORAL CONTINUOUS PRN
Status: DISCONTINUED | OUTPATIENT
Start: 2018-03-06 | End: 2018-03-06 | Stop reason: HOSPADM

## 2018-03-06 RX ORDER — TAMSULOSIN HYDROCHLORIDE 0.4 MG/1
0.8 CAPSULE ORAL AT BEDTIME
Status: DISCONTINUED | OUTPATIENT
Start: 2018-03-06 | End: 2018-03-07 | Stop reason: HOSPADM

## 2018-03-06 RX ORDER — FUROSEMIDE 10 MG/ML
40 INJECTION INTRAMUSCULAR; INTRAVENOUS ONCE
Status: COMPLETED | OUTPATIENT
Start: 2018-03-06 | End: 2018-03-06

## 2018-03-06 RX ORDER — CLOPIDOGREL BISULFATE 75 MG/1
300-600 TABLET ORAL
Status: COMPLETED | OUTPATIENT
Start: 2018-03-06 | End: 2018-03-06

## 2018-03-06 RX ADMIN — WARFARIN SODIUM 5 MG: 5 TABLET ORAL at 19:05

## 2018-03-06 RX ADMIN — ALPRAZOLAM 0.25 MG: 0.25 TABLET ORAL at 14:14

## 2018-03-06 RX ADMIN — ATORVASTATIN CALCIUM 40 MG: 40 TABLET, FILM COATED ORAL at 20:30

## 2018-03-06 RX ADMIN — FENTANYL CITRATE 25 MCG: 50 INJECTION, SOLUTION INTRAMUSCULAR; INTRAVENOUS at 08:40

## 2018-03-06 RX ADMIN — HEPARIN SODIUM 500 UNITS: 1000 INJECTION, SOLUTION INTRAVENOUS; SUBCUTANEOUS at 08:41

## 2018-03-06 RX ADMIN — TAMSULOSIN HYDROCHLORIDE 0.8 MG: 0.4 CAPSULE ORAL at 20:30

## 2018-03-06 RX ADMIN — NITROGLYCERIN 300 MCG: 5 INJECTION, SOLUTION INTRAVENOUS at 10:12

## 2018-03-06 RX ADMIN — Medication 5 MG: at 22:09

## 2018-03-06 RX ADMIN — FUROSEMIDE 40 MG: 10 INJECTION, SOLUTION INTRAVENOUS at 10:31

## 2018-03-06 RX ADMIN — MIDAZOLAM 0.5 MG: 1 INJECTION INTRAMUSCULAR; INTRAVENOUS at 13:57

## 2018-03-06 RX ADMIN — HEPARIN SODIUM 2000 UNITS: 1000 INJECTION, SOLUTION INTRAVENOUS; SUBCUTANEOUS at 10:09

## 2018-03-06 RX ADMIN — ASPIRIN 325 MG: 325 TABLET, DELAYED RELEASE ORAL at 08:08

## 2018-03-06 RX ADMIN — FENTANYL CITRATE 25 MCG: 50 INJECTION, SOLUTION INTRAMUSCULAR; INTRAVENOUS at 10:19

## 2018-03-06 RX ADMIN — MIDAZOLAM 0.5 MG: 1 INJECTION INTRAMUSCULAR; INTRAVENOUS at 08:44

## 2018-03-06 RX ADMIN — FENTANYL CITRATE 25 MCG: 50 INJECTION, SOLUTION INTRAMUSCULAR; INTRAVENOUS at 09:08

## 2018-03-06 RX ADMIN — MIDAZOLAM 0.5 MG: 1 INJECTION INTRAMUSCULAR; INTRAVENOUS at 10:02

## 2018-03-06 RX ADMIN — HEPARIN SODIUM 10000 UNITS: 1000 INJECTION, SOLUTION INTRAVENOUS; SUBCUTANEOUS at 09:51

## 2018-03-06 RX ADMIN — FUROSEMIDE 40 MG: 10 INJECTION, SOLUTION INTRAVENOUS at 19:05

## 2018-03-06 RX ADMIN — ACETAMINOPHEN 325 MG: 325 TABLET, FILM COATED ORAL at 16:36

## 2018-03-06 RX ADMIN — HEPARIN SODIUM 1500 UNITS: 1000 INJECTION, SOLUTION INTRAVENOUS; SUBCUTANEOUS at 08:41

## 2018-03-06 RX ADMIN — IOPAMIDOL 127 ML: 755 INJECTION, SOLUTION INTRAVASCULAR at 10:45

## 2018-03-06 RX ADMIN — CLOPIDOGREL BISULFATE 600 MG: 75 TABLET ORAL at 10:44

## 2018-03-06 NOTE — PHARMACY-ANTICOAGULATION SERVICE
Clinical Pharmacy - Warfarin Dosing Consult     Pharmacy has been consulted to manage this patient s warfarin therapy.  Indication: Atrial Fibrillation  Therapy Goal: INR 2-3  OP Anticoag Clinic: [facility]   Warfarin Prior to Admission: Yes  Warfarin PTA Regimen: no regular regimen, doses between 5 mg and 2.5 mg  Significant drug interactions: aspirin, clopidogrel   Recent documented change in oral intake/nutrition: No    INR   Date Value Ref Range Status   03/06/2018 1.20 (H) 0.86 - 1.14 Final     INR Point of Care   Date Value Ref Range Status   03/02/2018 2.1 (H) 0.86 - 1.14 Final     Comment:     This test is intended for monitoring Coumadin therapy.  Results are not   accurate in patients with prolonged INR due to factor deficiency.         Recommend warfarin 5 mg today.  Pharmacy will monitor Tad Manning daily and order warfarin doses to achieve specified goal.      Please contact pharmacy as soon as possible if the warfarin needs to be held for a procedure or if the warfarin goals change.

## 2018-03-06 NOTE — PLAN OF CARE
Problem: Patient Care Overview  Goal: Plan of Care/Patient Progress Review  Manual pressure held for 20 minutes to Right groin site.  Sheath, size  6 Fr and 7 Fr,  pulled by DARIUSZ Valderrama.  Site CDI, no hematoma.  Stasis achieved at 1410. Off bedrest @ 1810.

## 2018-03-06 NOTE — IP AVS SNAPSHOT
MRN:[id number]                      After Visit Summary   3/6/2018    Tad Manning    MRN: [id number]           Thank you!     Thank you for choosing [facility] for your care. Our goal is always to provide you with excellent care. Hearing back from our patients is one way we can continue to improve our services. Please take a few minutes to complete the written survey that you may receive in the mail after you visit with us. Thank you!        Patient Information     Date Of Birth          1940        About your hospital stay     You were admitted on:  March 6, 2018 You last received care in the:  Unit 6D Observation [facility]    You were discharged on:  March 7, 2018       Who to Call     For medical emergencies, please call 911.  For non-urgent questions about your medical care, please call your primary care provider or clinic, [phone]          Attending Provider     Provider Specialty    Clarisse Valdes MD Cardiology    [facility]Denton warren MD Cardiology       Primary Care Provider Office Phone # Fax #    Gene Guevara [phone]      Your next 10 appointments already scheduled     Apr 09, 2018 10:00 AM CDT   Lab with  LAB   [facility] ([facility])    [address]  1st Floor  [facility] [address] [phone]            Apr 09, 2018 10:30 AM CDT   (Arrive by 10:15 AM)   Return Visit with Clarisse Valdes MD   [facility] ([facility])    [address]  [address] [place] [id number] [phone]            Apr 16, 2018 10:00 AM CDT   (Arrive by 9:45 AM)   CT THORACIC SPINE W/O CONTRAST with UCCT1   [facility] ([facility])    [address]  1st Floor  [facility] [id number] [phone]           Please bring any scans or X-rays taken at other hospitals, if similar tests were done. Also bring a list of your medicines,  including vitamins, minerals and over-the-counter drugs. It is safest to leave personal items at home.  Be sure to tell your doctor:   If you have any allergies.   If there s any chance you are pregnant.   If you are breastfeeding.  You do not need to do anything special to prepare for this exam.  Please wear loose clothing, such as a sweat suit or jogging clothes. Avoid snaps, zippers and other metal. We may ask you to undress and put on a hospital gown.            Apr 16, 2018 10:30 AM CDT   XR THORACIC/LUMBAR STANDING 2 VIEWS (SCOLI) with UCXR1   [facility] ([facility] [place])    [address]  1st Floor  [place] [address] [phone]           Please bring a list of your current medicines to your exam. (Include vitamins, minerals and over-thecounter medicines.) Leave your valuables at home.  Tell your doctor if there is a chance you may be pregnant.  You do not need to do anything special for this exam.            Apr 16, 2018 11:15 AM CDT   (Arrive by 11:00 AM)   Return Visit with Marcelo Trent MD   [facility] ([facility] [place])    [address]  3rd Floor  [place] [address] [phone]              Additional Services     CARDIAC REHAB REFERRAL       Patient may choose their preference of the site for Cardiac Rehab.                  Further instructions from your care team       Going Home after Coronary Angioplasty or Stent Placement       Name: Tad Manning  Medical Record Number:  [id number]  Today's Date: March 7, 2018        For 24 hours:         Have an adult stay with you for 24 hours.         Relax and take it easy.         Drink plenty of fluids.         You may eat your normal diet, unless your doctor tells you otherwise.         Do NOT make any important or legal decisions.         Do NOT drive or operate machines at home or at work.         Do NOT drink alcohol.      Do NOT smoke.      Medicines:         If you have begun Plavix (clopidogrel), do not stop taking it until you talk to your heart doctor (cardiologist).         If you are on metformin (Glucophage), do not restart it until you have blood tests (within 2 to 3 days after discharge). When your doctor tells you it is safe, you may restart the metformin.         If you have stopped any other medicines, check with your nurse or provider about when to restart them.    Care of groin site:         Remove the Band-Aid after 24 hours. If there is minor oozing, apply another Band-aid and remove it after 12 hours.          Do NOT take a bath, or use a hot tub or pool for at least 3 days. You may shower.          It is normal to have a small bruise or lump at the site.         Do not scrub the site.         Do not use lotion or powder near the puncture site for 3 days.         For the first 2 days: Do not stoop or squat. When you cough, sneeze or move your bowels, hold your hand over the puncture site and press gently.         Do not lift more than 10 pounds for at least 3 to 5 days.         For 2 days, do NOT have sex or do any heavy exercise.     If you start bleeding from the site in your groin:  Lie down flat and press firmly on the site.  Call your physician immediately, or, come to the emergency room.      Call 911 right away if you have bleeding that is heavy or does not stop.     Call your doctor if:         You have a large or growing hard lump around the site.         The site is red, swollen, hot or tender.         Blood or fluid is draining from the site.         You have chills or a fever greater than 101 F (38 C).         Your leg or arm turns bluish, feels numb or cool.         You have hives, a rash or unusual itching.     Cardiac Rehabilitation: You should receive a phone call from the Cardiac Rehabilitation Department within the next week.  If you do not receive the call, please contact [facility] at [phone].    ADDITIONAL INSTRUCTIONS:   1) Take aspirin for seven days or until your INR level reaches 2, then stop taking aspirin. Continue taking Plavix and coumadin daily.   2) Follow-up with your primary care provider in one week.   3) Follow-up with your Cardiologist as previously scheduled on 4/9/18. If you have any questions, please contact the Cardiology Clinic at [phone].    [facility] [place]:   [phone] (7 days a week)      Cardiology Fellow on call (24 hours per day) at [facility]:   [phone] (ask for Cardiology Fellow on call)     Pending Results     Date and Time Order Name Status Description    3/6/2018 2300 EKG 12-lead, tracing only  Preliminary     3/6/2018 1125 EKG 12-lead, tracing only  Preliminary             Statement of Approval     Ordered          03/07/18 0935  I have reviewed and agree with all the recommendations and orders detailed in this document.  EFFECTIVE NOW     Approved and electronically signed by:  Jeannie Cobian APRN CNP             Admission Information     Date & Time Provider Department Dept. Phone    3/6/2018 Denton Young MD Unit 6D Observation [facility] [phone]      Your Vitals Were     Blood Pressure Pulse Temperature Respirations Height Weight    124/73 88 97.8  F (36.6  C) (Oral) 16 1.829 m (6') 96 kg (211 lb 10.3 oz)    Pulse Oximetry BMI (Body Mass Index)                98% 28.7 kg/m2          [facility] Information     [employer] gives you secure access to your electronic health record. If you see a primary care provider, you can also send messages to your care team and make appointments. If you have questions, please call your primary care clinic.  If you do not have a primary care provider, please call [phone] and they will assist you.        Care EveryWhere ID     This is your Care EveryWhere ID. This could be used by other organizations to access your [facility] medical records  [id number]         Equal Access to Services     [facility]: Hadii aad ku hadevanghada Sogin, waaxda luqadaha, qaybta kalarrydeandre holley. So [facility] [phone].    ATENCIÓN: Si habla español, tiene a noe disposición servicios gratuitos de asistencia lingüística. Llame al [phone].    We comply with applicable federal civil rights laws and [place] laws. We do not discriminate on the basis of race, color, national origin, age, disability, sex, sexual orientation, or gender identity.               Review of your medicines      START taking        Dose / Directions    clopidogrel 75 MG tablet   Commonly known as:  PLAVIX   Used for:  Status post percutaneous transluminal coronary angioplasty, Coronary artery disease involving native coronary artery of native heart without angina pectoris        Dose:  75 mg   Take 1 tablet (75 mg) by mouth daily   Quantity:  90 tablet   Refills:  3         CONTINUE these medicines which may have CHANGED, or have new prescriptions. If we are uncertain of the size of tablets/capsules you have at home, strength may be listed as something that might have changed.        Dose / Directions    aspirin 81 MG tablet   This may have changed:  additional instructions   Used for:  Status post percutaneous transluminal coronary angioplasty, Coronary artery disease involving native coronary artery of native heart without angina pectoris        Dose:  81 mg   Take 1 tablet (81 mg) by mouth daily Until INR level reaches 2.   Quantity:  60 tablet   Refills:  3         CONTINUE these medicines which have NOT CHANGED        Dose / Directions    acetaminophen 325 MG tablet   Commonly known as:  TYLENOL        Dose:  325 mg   Take 325 mg by mouth every 4 hours as needed for mild pain   Refills:  0       ALPRAZolam 0.25 MG tablet   Commonly known as:  XANAX   Used for:  Epidural abscess        TK 1 T PO QHS PRN   Refills:  1       atorvastatin 40 MG tablet   Commonly known as:   LIPITOR   Used for:  Claudication of left lower extremity (H)        Dose:  40 mg   Take 1 tablet (40 mg) by mouth daily   Quantity:  30 tablet   Refills:  1       CELEXA PO        Dose:  10 mg   Take 10 mg by mouth daily   Refills:  0       cholecalciferol 1000 UNITS Tabs   Used for:  Loop diuretic causing adverse effect in therapeutic use, subsequent encounter        Dose:  1000 Units   Take 1,000 Units by mouth daily   Quantity:  30 tablet   Refills:  0       furosemide 20 MG tablet   Commonly known as:  LASIX   Used for:  Chronic systolic heart failure (H)        Furosemide 40 mg po daily   Quantity:  180 tablet   Refills:  3       lisinopril 10 MG tablet   Commonly known as:  PRINIVIL/ZESTRIL        Dose:  10 mg   Take 1 tablet (10 mg) by mouth daily   Quantity:  30 tablet   Refills:  0       MELATONIN PO        Dose:  5 mg   Take 5 mg by mouth daily   Refills:  0       metFORMIN 500 MG tablet   Commonly known as:  GLUCOPHAGE   Used for:  Type 2 diabetes mellitus with diabetic polyneuropathy, without long-term current use of insulin (H)        Dose:  500 mg   Take 1 tablet (500 mg) by mouth 2 times daily (with meals)   Quantity:  60 tablet   Refills:  0       metoprolol succinate 200 MG 24 hr tablet   Commonly known as:  TOPROL-XL   Used for:  Chronic systolic heart failure (H)        Dose:  200 mg   Take 1 tablet (200 mg) by mouth daily   Quantity:  90 tablet   Refills:  3       nitroGLYcerin 0.4 MG sublingual tablet   Commonly known as:  NITROSTAT   Used for:  Atrial flutter, unspecified type (H)        For chest pain place 1 tablet under the tongue every 5 minutes for 3 doses. If symptoms persist 5 minutes after 1st dose call 911.   Quantity:  25 tablet   Refills:  0       nystatin cream   Commonly known as:  MYCOSTATIN        Apply topically 2 times daily   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), Diabetic ulcer of left foot due to type 2 diabetes  mellitus (H)        Equipment being ordered: DH2 shoe   Quantity:  1 Device   Refills:  0       ranitidine 150 MG tablet   Commonly known as:  ZANTAC   Used for:  Gastroesophageal reflux disease without esophagitis        Dose:  150 mg   Take 1 tablet (150 mg) by mouth 2 times daily   Quantity:  60 tablet   Refills:  0       spironolactone 25 MG tablet   Commonly known as:  ALDACTONE   Used for:  Chronic systolic congestive heart failure (H)        Dose:  12.5 mg   Take 0.5 tablets (12.5 mg) by mouth daily   Quantity:  45 tablet   Refills:  3       tamsulosin 0.4 MG capsule   Commonly known as:  FLOMAX   Used for:  Renal insufficiency        Dose:  0.8 mg   Take 2 capsules (0.8 mg) by mouth At Bedtime   Quantity:  60 capsule   Refills:  0       warfarin 5 MG tablet   Commonly known as:  COUMADIN   Used for:  Paroxysmal atrial fibrillation (H)        Dose:  5 mg   Take 1 tablet (5 mg) by mouth daily Dose adjusted per [facility].   Quantity:  90 tablet   Refills:  3            Where to get your medicines      These medications were sent to [facility] [address] [address]     Phone:  [phone]     clopidogrel 75 MG tablet         Some of these will need a paper prescription and others can be bought over the counter. Ask your nurse if you have questions.     You don't need a prescription for these medications     aspirin 81 MG tablet                Protect others around you: Learn how to safely use, store and throw away your medicines at www.disposemymeds.org.             Medication List: This is a list of all your medications and when to take them. Check marks below indicate your daily home schedule. Keep this list as a reference.      Medications           Morning Afternoon Evening Bedtime As Needed    acetaminophen 325 MG tablet   Commonly known as:  TYLENOL   Take 325 mg by mouth every 4 hours as needed for mild pain   Last time  this was given:  325 mg on 3/6/2018  4:36 PM                                ALPRAZolam 0.25 MG tablet   Commonly known as:  XANAX   TK 1 T PO QHS PRN   Last time this was given:  0.25 mg on 3/7/2018  6:37 AM                                aspirin 81 MG tablet   Take 1 tablet (81 mg) by mouth daily Until INR level reaches 2.                                atorvastatin 40 MG tablet   Commonly known as:  LIPITOR   Take 1 tablet (40 mg) by mouth daily   Last time this was given:  40 mg on 3/6/2018  8:30 PM                                CELEXA PO   Take 10 mg by mouth daily   Last time this was given:  10 mg on 3/7/2018  9:04 AM                                cholecalciferol 1000 UNITS Tabs   Take 1,000 Units by mouth daily   Last time this was given:  1,000 Units on 3/7/2018  9:04 AM                                clopidogrel 75 MG tablet   Commonly known as:  PLAVIX   Take 1 tablet (75 mg) by mouth daily   Last time this was given:  75 mg on 3/7/2018  9:04 AM                                furosemide 20 MG tablet   Commonly known as:  LASIX   Furosemide 40 mg po daily   Last time this was given:  40 mg on 3/7/2018  9:04 AM                                lisinopril 10 MG tablet   Commonly known as:  PRINIVIL/ZESTRIL   Take 1 tablet (10 mg) by mouth daily   Last time this was given:  10 mg on 3/7/2018  9:04 AM                                MELATONIN PO   Take 5 mg by mouth daily   Last time this was given:  5 mg on 3/6/2018 10:09 PM                                metFORMIN 500 MG tablet   Commonly known as:  GLUCOPHAGE   Take 1 tablet (500 mg) by mouth 2 times daily (with meals)                                metoprolol succinate 200 MG 24 hr tablet   Commonly known as:  TOPROL-XL   Take 1 tablet (200 mg) by mouth daily   Last time this was given:  200 mg on 3/7/2018  9:04 AM                                nitroGLYcerin 0.4 MG sublingual tablet   Commonly known as:  NITROSTAT   For chest pain place 1 tablet under the  tongue every 5 minutes for 3 doses. If symptoms persist 5 minutes after 1st dose call 911.                                nystatin cream   Commonly known as:  MYCOSTATIN   Apply topically 2 times daily                                order for DME   Equipment being ordered: [facility] shoe                                ranitidine 150 MG tablet   Commonly known as:  ZANTAC   Take 1 tablet (150 mg) by mouth 2 times daily                                spironolactone 25 MG tablet   Commonly known as:  ALDACTONE   Take 0.5 tablets (12.5 mg) by mouth daily   Last time this was given:  12.5 mg on 3/7/2018  9:04 AM                                tamsulosin 0.4 MG capsule   Commonly known as:  FLOMAX   Take 2 capsules (0.8 mg) by mouth At Bedtime   Last time this was given:  0.8 mg on 3/6/2018  8:30 PM                                warfarin 5 MG tablet   Commonly known as:  COUMADIN   Take 1 tablet (5 mg) by mouth daily Dose adjusted per [facility].   Last time this was given:  5 mg on 3/6/2018  7:05 PM

## 2018-03-06 NOTE — PLAN OF CARE
Problem: Patient Care Overview  Goal: Plan of Care/Patient Progress Review  Patient developed a hematoma due to movement after hemostasis was achieved.  Manual pressure was then applied again for 5 more minutes by Jannie.  Patient if off bedrest at 1820.

## 2018-03-06 NOTE — IP AVS SNAPSHOT
Unit 6D Observation [address] [address]    Phone:  [phone]    Fax:  [phone]                                       After Visit Summary   3/6/2018    Tad Manning    MRN: [id number]           After Visit Summary Signature Page     I have received my discharge instructions, and my questions have been answered. I have discussed any challenges I see with this plan with the nurse or doctor.    ..........................................................................................................................................  Patient/Patient Representative Signature      ..........................................................................................................................................  Patient Representative Print Name and Relationship to Patient    ..................................................               ................................................  Date                                            Time    ..........................................................................................................................................  Reviewed by Signature/Title    ...................................................              ..............................................  Date                                                            Time

## 2018-03-06 NOTE — PROCEDURES
PRELIMINARY CARDIAC CATH REPORT:     PROCEDURES PERFORMED:   Right Heart Catheterization  Coronary Angiography  Coronary Bypass Angiography  Percutaneous Coronary Intervention    PHYSICIANS:  Attending Physician: Russell Gaines MD  Cardiology Fellow: Nasim Lainez MD, PhD    INDICATION:  Tad Manning is a 77 year old male with history of CAD s/p three vessel CABG with LIMA to LAD, SVG to OM1, and SVG to D1 in 1995, systolic heart failure, and CLL who comes in for hemodynamic evaluation and coronary angiogram to evaluate his cardiomyopathy.    DESCRIPTION:  1. Consent obtained with discussion of risks.  All questions were answered.  2. Sterile prep and procedure.  3. Location with Sheaths:   Rt Femoral Arterial  4 Fr 10 cm [short]  Rt Femoral Venous  7 Fr 10 cm [short]  4. Access: Local anesthetic with lidocaine.  A standard 18 guage needle with ultrasound guidance was used to establish vascular access using a modified Seldinger technique.  5. Diagnostic Catheters:   4 Fr  JR4, JL 4 and DEXTER  7 Fr  Tornado Tosha  6. Guiding Catheters:  6 Fr  IM GC  6. Estimated blood loss: < 50 ml    MEDICATIONS:  The procedure was performed under conscious sedation for 105 minutes from 0840 to 1025.  The patient was assessed immediately before the first sedation medication was administered.  Midazolam 1.0 mg and Fentanyl 75 mcg were administered.  Heart rate, BP, respiration, oxygen saturation and patient responses were monitored throughout the procedure with the assistance of the RN under my supervision.  >> IV UFH 5000 U was administered to achieve anticoagulation. IA nitroglycerin was given to maximally dilated coronary arteries into the subclavian/LIMA junction.  >> Antiplatelet Therapy: ASA 81 mg  or Plavix 600 mg    Procedures:    HEMODYNAMICS:  1. HR 56 bpm  2. /60 (80) mmHg  3. RA 17   4. RV 55/18  5. PA 53/24 (32)   6. PCW 24   7. PA sat 54.9%   8. PCW sat Unable to obtain%  9. Hgb 12.5 g/dL   10. Marisabel CO/CI 4.5/2.1    11. TD CO/CI 3.8/1.7   12. PVR 2.1      CORONARY ANGIOGRAM:   1. Both coronary arteries arise from their respective cusps.  2. Dominance: Right  3. The LM is short and has moderate disease.     4. LAD: The LAD is 100% occluded after the first septal .  The first diagonal has moderate disease.    5. LCX is occluded proximally with diffuse disease in the native vessel.  6. RCA gives rise to PL branches and supplies PDA. The pRCA has a less than 25% stenosis, mRCA has a 50% stenosis, and the remaining vessel and PDA showing diffuse mild disease of less than 25%    CORONARY ARTERY BYPASS ANGIOGRAPHY:  Coronary bypass angiography was performed on the LIMA to LAD, the SVG to OM1, and the SVG to D1.  The vein grafts are patent with only mild diffuse disease in the native OM1 and D1.  The native LAD after the LIMA touchdown has two discrete lesions with a 75% lesion proximally and another 75% long lesion distal to the first lesion.    PERCUTANEOUS CORONARY INTERVENTION:   Lesion #1:  LAD: mid  A 6 Fr  IM GC was positioned at the ostium of the LIMA.  >> IV UFH 5000 U was administered to achieve anticoagulation.    The 4 Fr arterial sheath was upsized to a 6 Fr prior to PCI.  A 0.14  50 wire was advanced across the mid LAD lesion and positioned in the apical LAD.  A 2.5 x 15 mm balloon was used to pre-dilate the lesion.  A 2.75 x 38mm Synergy drug eluting stent was successfully deployed across the mid LAD lesion with inflation to 13 godwin.  A second 3.0 x 20 mm  Synergy drug eluting stent was placed proximally to the first stent.  Final angiography showed no evidence of perforation or dissection with residual stenosis of 0% and FENG 3 flow.  No complications.    Sheath Removal:  The venous and arterial sheath will be removed after the patient has been transferred to the unit.    Contrast: Isovue, 127 ml     Fluoroscopy Time: 39.7 min    COMPLICATIONS:  1. None    SUMMARY:   -Elevated biventricular filling  pressures  -Reduced cardiac output  -Mild Group 2 Pulmonary Hypertension  -Multivessel coronary disease with moderate disease in RCA, severe disease in the native LAD and LCx, and patient LIMA to LAD, SVG to OM1, and SVG to D1  -PCI to mid LAD with 2 BHAVNA with excellent results in the mid LAD  -Severe calcification and disease in the right femoral artery with difficult access    PLAN:   >> ASA 81 mg qd and Plavix 75 mg qd   >>40 mg of IV lasix given  >> Bedrest per protocol.  >> Continued medical management and lifestyle modification for cardiovascular risk factor optimization.   >>. Admit for observation    The attending interventional cardiologist was present and supervised all critical aspects the procedure.    Findings discussed with Dr. Spear.    See CVIS report for final draft.    Nasim Lainez MD, PhD  Cardiology Fellow    Russell Gaines MD   Cardiology Staff

## 2018-03-06 NOTE — PROGRESS NOTES
CLINICAL NUTRITION SERVICES    Reason for Assessment:  Heart-healthy nutrition education, received consult.    Diet History:  Pt reports no history of receiving heart-healthy nutrition education in the past.  About 1 year ago was hospitalized and later had rehab - at the time the focus was on patient's poor appetite.      Nutrition Diagnosis:  Food- and nutrition-related knowledge deficit r/t no previous knowledge of heart-healthy diet AEB pt report of no previous formal heart-healthy nutrition education.    Nutrition Prescription/Recs:  Continue heart-healthy diet.      Interventions:  Nutrition Education  Provided handouts:  Heart Health Guidelines (includes Heart Healthy Food Choices), Your Heart-Healthy Diet (Words You Will Hear), 10 Tips for Heart-Healthy Cooking, Dining Out With Your Heart In Mind, Recipe Changes for Heart-Healthy Cooking, Heart Healthy Recipes    Patient requested re-visit tomorrow for discussion.  Is very uncomfortable currently on bed rest.  Left handouts at bedside.     Goals:    1.  Pt will verbalize at least four foods high in saturated or trans-fats and five foods high in sodium.    2.  Pt will list at least two interventions to make current meal plan more heart-healthy.     Follow-up:   Patient to ask any further nutrition-related questions before discharge.  In addition, pt may request outpatient RD appointment.    Alma Delia Terry MS, RD, LD  Pager [phone]

## 2018-03-06 NOTE — PROGRESS NOTES
Text paged Eliz WALSH with [employer] requesting home dose of Xanex for anxiety.  Waiting for call back/orders.

## 2018-03-06 NOTE — DISCHARGE INSTRUCTIONS
Going Home after Coronary Angioplasty or Stent Placement       Name: Tad Manning  Medical Record Number:  [id number]  Today's Date: March 7, 2018        For 24 hours:         Have an adult stay with you for 24 hours.         Relax and take it easy.         Drink plenty of fluids.         You may eat your normal diet, unless your doctor tells you otherwise.         Do NOT make any important or legal decisions.         Do NOT drive or operate machines at home or at work.         Do NOT drink alcohol.      Do NOT smoke.     Medicines:         If you have begun Plavix (clopidogrel), do not stop taking it until you talk to your heart doctor (cardiologist).         If you are on metformin (Glucophage), do not restart it until you have blood tests (within 2 to 3 days after discharge). When your doctor tells you it is safe, you may restart the metformin.         If you have stopped any other medicines, check with your nurse or provider about when to restart them.    Care of groin site:         Remove the Band-Aid after 24 hours. If there is minor oozing, apply another Band-aid and remove it after 12 hours.          Do NOT take a bath, or use a hot tub or pool for at least 3 days. You may shower.          It is normal to have a small bruise or lump at the site.         Do not scrub the site.         Do not use lotion or powder near the puncture site for 3 days.         For the first 2 days: Do not stoop or squat. When you cough, sneeze or move your bowels, hold your hand over the puncture site and press gently.         Do not lift more than 10 pounds for at least 3 to 5 days.         For 2 days, do NOT have sex or do any heavy exercise.     If you start bleeding from the site in your groin:  Lie down flat and press firmly on the site.  Call your physician immediately, or, come to the emergency room.      Call 911 right away if you have bleeding that is heavy or does not stop.     Call your doctor if:         You  have a large or growing hard lump around the site.         The site is red, swollen, hot or tender.         Blood or fluid is draining from the site.         You have chills or a fever greater than 101 F (38 C).         Your leg or arm turns bluish, feels numb or cool.         You have hives, a rash or unusual itching.     Cardiac Rehabilitation: You should receive a phone call from the Cardiac Rehabilitation Department within the next week.  If you do not receive the call, please contact [facility] at [phone].    ADDITIONAL INSTRUCTIONS:   1) Take aspirin for seven days or until your INR level reaches 2, then stop taking aspirin. Continue taking Plavix and coumadin daily.   2) Follow-up with your primary care provider in one week.   3) Follow-up with your Cardiologist as previously scheduled on 4/9/18. If you have any questions, please contact the Cardiology Clinic at [phone].    [facility] at [facility]:   [phone] (7 days a week)      Cardiology Fellow on call (24 hours per day) at [facility]:   [phone] (ask for Cardiology Fellow on call)

## 2018-03-07 ENCOUNTER — ANTICOAGULATION THERAPY VISIT (OUTPATIENT)
Dept: ANTICOAGULATION | Facility: CLINIC | Age: 78
End: 2018-03-07

## 2018-03-07 VITALS
SYSTOLIC BLOOD PRESSURE: 124 MMHG | BODY MASS INDEX: 28.67 KG/M2 | DIASTOLIC BLOOD PRESSURE: 73 MMHG | OXYGEN SATURATION: 98 % | WEIGHT: 211.64 LBS | HEART RATE: 88 BPM | TEMPERATURE: 97.8 F | RESPIRATION RATE: 16 BRPM | HEIGHT: 72 IN

## 2018-03-07 DIAGNOSIS — I48.91 ATRIAL FIBRILLATION (H): ICD-10-CM

## 2018-03-07 DIAGNOSIS — Z79.01 LONG-TERM (CURRENT) USE OF ANTICOAGULANTS: ICD-10-CM

## 2018-03-07 LAB
ANION GAP SERPL CALCULATED.3IONS-SCNC: 10 MMOL/L (ref 3–14)
ANION GAP SERPL CALCULATED.3IONS-SCNC: 9 MMOL/L (ref 3–14)
BUN SERPL-MCNC: 26 MG/DL (ref 7–30)
BUN SERPL-MCNC: 29 MG/DL (ref 7–30)
CALCIUM SERPL-MCNC: 8.5 MG/DL (ref 8.5–10.1)
CALCIUM SERPL-MCNC: 8.6 MG/DL (ref 8.5–10.1)
CHLORIDE SERPL-SCNC: 104 MMOL/L (ref 94–109)
CHLORIDE SERPL-SCNC: 105 MMOL/L (ref 94–109)
CO2 SERPL-SCNC: 25 MMOL/L (ref 20–32)
CO2 SERPL-SCNC: 26 MMOL/L (ref 20–32)
CREAT SERPL-MCNC: 0.93 MG/DL (ref 0.66–1.25)
CREAT SERPL-MCNC: 0.98 MG/DL (ref 0.66–1.25)
ERYTHROCYTE [DISTWIDTH] IN BLOOD BY AUTOMATED COUNT: 17.8 % (ref 10–15)
GFR SERPL CREATININE-BSD FRML MDRD: 74 ML/MIN/1.7M2
GFR SERPL CREATININE-BSD FRML MDRD: 79 ML/MIN/1.7M2
GLUCOSE SERPL-MCNC: 131 MG/DL (ref 70–99)
GLUCOSE SERPL-MCNC: 168 MG/DL (ref 70–99)
HCT VFR BLD AUTO: 36.8 % (ref 40–53)
HGB BLD-MCNC: 11.4 G/DL (ref 13.3–17.7)
INR PPP: 1.22 (ref 0.86–1.14)
INTERPRETATION ECG - MUSE: NORMAL
INTERPRETATION ECG - MUSE: NORMAL
MAGNESIUM SERPL-MCNC: 1.7 MG/DL (ref 1.6–2.3)
MCH RBC QN AUTO: 27.9 PG (ref 26.5–33)
MCHC RBC AUTO-ENTMCNC: 31 G/DL (ref 31.5–36.5)
MCV RBC AUTO: 90 FL (ref 78–100)
PLATELET # BLD AUTO: 98 10E9/L (ref 150–450)
POTASSIUM SERPL-SCNC: 3.7 MMOL/L (ref 3.4–5.3)
POTASSIUM SERPL-SCNC: 3.7 MMOL/L (ref 3.4–5.3)
RBC # BLD AUTO: 4.08 10E12/L (ref 4.4–5.9)
SODIUM SERPL-SCNC: 139 MMOL/L (ref 133–144)
SODIUM SERPL-SCNC: 140 MMOL/L (ref 133–144)
WBC # BLD AUTO: 4.2 10E9/L (ref 4–11)

## 2018-03-07 PROCEDURE — 25000125 ZZHC RX 250: Performed by: INTERNAL MEDICINE

## 2018-03-07 PROCEDURE — 85027 COMPLETE CBC AUTOMATED: CPT | Performed by: INTERNAL MEDICINE

## 2018-03-07 PROCEDURE — A9270 NON-COVERED ITEM OR SERVICE: HCPCS | Mod: GY | Performed by: INTERNAL MEDICINE

## 2018-03-07 PROCEDURE — 36415 COLL VENOUS BLD VENIPUNCTURE: CPT | Performed by: INTERNAL MEDICINE

## 2018-03-07 PROCEDURE — 83735 ASSAY OF MAGNESIUM: CPT | Performed by: INTERNAL MEDICINE

## 2018-03-07 PROCEDURE — 40000065 ZZH STATISTIC EKG NON-CHARGEABLE

## 2018-03-07 PROCEDURE — 25000132 ZZH RX MED GY IP 250 OP 250 PS 637: Mod: GY | Performed by: INTERNAL MEDICINE

## 2018-03-07 PROCEDURE — 80048 BASIC METABOLIC PNL TOTAL CA: CPT | Mod: 91 | Performed by: INTERNAL MEDICINE

## 2018-03-07 PROCEDURE — 93005 ELECTROCARDIOGRAM TRACING: CPT

## 2018-03-07 PROCEDURE — 93010 ELECTROCARDIOGRAM REPORT: CPT | Performed by: INTERNAL MEDICINE

## 2018-03-07 PROCEDURE — 85610 PROTHROMBIN TIME: CPT | Performed by: INTERNAL MEDICINE

## 2018-03-07 PROCEDURE — 80048 BASIC METABOLIC PNL TOTAL CA: CPT | Performed by: INTERNAL MEDICINE

## 2018-03-07 RX ORDER — POTASSIUM CHLORIDE 7.45 MG/ML
10 INJECTION INTRAVENOUS
Status: DISCONTINUED | OUTPATIENT
Start: 2018-03-07 | End: 2018-03-07 | Stop reason: RX

## 2018-03-07 RX ORDER — POTASSIUM CHLORIDE 750 MG/1
20-40 TABLET, EXTENDED RELEASE ORAL
Status: DISCONTINUED | OUTPATIENT
Start: 2018-03-07 | End: 2018-03-07 | Stop reason: HOSPADM

## 2018-03-07 RX ORDER — POTASSIUM CHLORIDE 1.5 G/1.58G
20-40 POWDER, FOR SOLUTION ORAL
Status: DISCONTINUED | OUTPATIENT
Start: 2018-03-07 | End: 2018-03-07 | Stop reason: HOSPADM

## 2018-03-07 RX ORDER — POTASSIUM CHLORIDE 29.8 MG/ML
20 INJECTION INTRAVENOUS
Status: DISCONTINUED | OUTPATIENT
Start: 2018-03-07 | End: 2018-03-07 | Stop reason: HOSPADM

## 2018-03-07 RX ORDER — MAGNESIUM SULFATE HEPTAHYDRATE 40 MG/ML
4 INJECTION, SOLUTION INTRAVENOUS EVERY 4 HOURS PRN
Status: DISCONTINUED | OUTPATIENT
Start: 2018-03-07 | End: 2018-03-07 | Stop reason: HOSPADM

## 2018-03-07 RX ORDER — POTASSIUM CL/LIDO/0.9 % NACL 10MEQ/0.1L
10 INTRAVENOUS SOLUTION, PIGGYBACK (ML) INTRAVENOUS
Status: DISCONTINUED | OUTPATIENT
Start: 2018-03-07 | End: 2018-03-07 | Stop reason: HOSPADM

## 2018-03-07 RX ADMIN — DIVALPROEX SODIUM 12.5 MG: 500 TABLET, FILM COATED, EXTENDED RELEASE ORAL at 09:04

## 2018-03-07 RX ADMIN — FUROSEMIDE 40 MG: 40 TABLET ORAL at 09:04

## 2018-03-07 RX ADMIN — ASPIRIN 81 MG: 81 TABLET, COATED ORAL at 09:04

## 2018-03-07 RX ADMIN — ALPRAZOLAM 0.25 MG: 0.25 TABLET ORAL at 06:37

## 2018-03-07 RX ADMIN — VITAMIN D, TAB 1000IU (100/BT) 1000 UNITS: 25 TAB at 09:04

## 2018-03-07 RX ADMIN — METOPROLOL SUCCINATE 200 MG: 100 TABLET, EXTENDED RELEASE ORAL at 09:04

## 2018-03-07 RX ADMIN — POTASSIUM CHLORIDE 20 MEQ: 750 TABLET, EXTENDED RELEASE ORAL at 03:40

## 2018-03-07 RX ADMIN — CLOPIDOGREL 75 MG: 75 TABLET, FILM COATED ORAL at 09:04

## 2018-03-07 RX ADMIN — CITALOPRAM HYDROBROMIDE 10 MG: 10 TABLET ORAL at 09:04

## 2018-03-07 RX ADMIN — Medication 2 G: at 06:34

## 2018-03-07 RX ADMIN — LISINOPRIL 10 MG: 10 TABLET ORAL at 09:04

## 2018-03-07 NOTE — PROGRESS NOTES
Discharge instructions reviewed, understood, and signed by patient. VSS, PIV removed, new medications reviewed and understood, patient has all belongings. Patient left unit via wheelchair with family.

## 2018-03-07 NOTE — PROVIDER NOTIFICATION
Dr. Lainez paged RE per pt, can d/c this morning. Does team want K or Mg recheck after replacement before d/c?    Dr. Lainez returned paged. Per team, will place d/c order. Pt does not need K or Mg rechecked before d/c.

## 2018-03-07 NOTE — PROGRESS NOTES
Dates of hospitalization: 3/6/18 to 3/7/18  Reason for hospitalization: Right Heart Cath, Coronary Angiogram, Coronary Bypass Angiography, Percutaneous Coronary Intervention  Vitamin K or FFP administered? No  Inpatient warfarin doses added to calendar? Yes   Medication changes at discharge: Patient said he is on Aspirin for 1 more day. Started PLavix 75mgs daily  Warfarin dosing after DC: added to Anticoag Track Calendar  Patient discharged on Lovenox? No  Next INR date: 3/12/18  Where is the patient discharging to? (home, TCU, staying locally, etc.): Home  Will patient have home care? No    I spoke with Tad today and he understands and agrees with this plan.

## 2018-03-07 NOTE — PROGRESS NOTES
"NUTRITION EDUCATION    REASON FOR ASSESSMENT:  Nutrition education on American Heart Association (AHA) Heart Healthy Diet.    NUTRITION HISTORY:  Information obtained from Patient:  -[place] so he has been accustomed to eating red meat often. But, he does eat chicken, turkey, and salmon as sources of meat.  Described his diet as \"meat & potatoes.\"   - Understands how to read nutrition labels, but does not fully commit to reading them, unless he truly feels motivated to do so.   - Typically, eats [employer] frozen meals.     CURRENT DIET ORDER:  Regular     NUTRITION DIAGNOSIS:  Food- and nutrition-related knowledge deficit R/t no previous knowledge of heart-healthy diet AEB no prior formal education received.     INTERVENTIONS:  Nutrition Prescription:    Recommended AHA Heart Healthy Diet    Implementation:     Nutrition Education (Content):  a) reviewed AHA Heart Healthy Diet guidelines  b) provided additional Healthy Heart diet handouts    Nutrition Education (Application):  a) Discussed current eating habits and recommended alternative food choices    Anticipate fair-good compliance    Diet Education - refer to Education flowsheet    Goals:    Patient verbalizes understanding of diet by engaging in conversation and asking questions.     All of the above goals met during education session    Follow Up/Monitoring:    Provided RD contact information for future questions      Cherie Manuel, Dietetic Intern     Alma Delia Terry MS, RD, LD  Pager [phone]    "

## 2018-03-07 NOTE — PLAN OF CARE
Problem: Patient Care Overview  Goal: Plan of Care/Patient Progress Review  Pt alert and oriented x 4. Vitals WNL except pt was requiring oxygen to keep sats >90. Pt on 1 L over night. Pt's groin sites clean, dry and intact. No hematoma noted. Pt voiding without any issues. Pt ate dinner without any issues. Pt was able to stand bedside with standby assist but has not yet been out ambulating. Educated patient that he does need to do this before discharging. Pt stated his understanding. Pt continued on tele with HR in 70s. Labs checked over night and pt was given PRN potassium per protocol and PRN magnesium per protocol. Will recheck labs this AM. Pt able to make needs known. Call light within reach. Will continue to monitor.

## 2018-03-07 NOTE — PROGRESS NOTES
Patient off bedrest at 1830. Right groin site is clean, dry, intact. No bleeding and no hematoma noted. VSS. . Patient very anxious throughout afternoon; administered xanax per home dosage. Appears much more comfortable and calm at this time following bedrest. Patient tolerating a regular diet. Patient voiding spontaneously. Second dose of IV lasix administered; will monitor output. Patient remains on tele; HR 70-80. Patient remains on 2L O2 via NC; Sa O2 99%, will continue to wean. Patient reports SOB while laying down; lung sounds coarse. Patient reports slight improvement after bedrest but will monitor. Patient reported low back pain during bedrest; warm packs and tylenol administered with moderate relief. Patient denies pain since end of bedrest. Denies chest pain or palpitations. Patient has yet to ambulate. Plan for patient to stay overnight for monitoring with potential discharge tomorrow.

## 2018-03-07 NOTE — MR AVS SNAPSHOT
Tad Manning   3/7/2018   Anticoagulation Therapy Visit    Description:  77 year old male   Provider:  Tad Huerta [facility]   Department:  [facility]           INR as of 3/7/2018     Today's INR 1.22!      Anticoagulation Summary as of 3/7/2018     INR goal 2.0-3.0   Today's INR 1.22!   Full instructions 3/7: 5 mg; 3/8: 5 mg; 3/9: 5 mg; 3/10: 2.5 mg; 3/11: 5 mg; Otherwise No maintenance plan   Next INR check 3/12/2018    Indications   Atrial fibrillation (H) [I48.91] [I48.91]  Long-term (current) use of anticoagulants [Z79.01] [Z79.01]         March 2018 Details    Sun Mon Tue Wed Thu Fri Sat         1               2               3                 4               5               6               7      5 mg   See details      8      5 mg         9      5 mg         10      2.5 mg           11      5 mg         12            13               14               15               16               17                 18               19               20               21               22               23               24                 25               26               27               28               29               30               31                Date Details   03/07 This INR check       Date of next INR:  3/12/2018         How to take your warfarin dose     To take:  2.5 mg Take 0.5 of a 5 mg tablet.    To take:  5 mg Take 1 of the 5 mg tablets.

## 2018-03-08 ENCOUNTER — CARE COORDINATION (OUTPATIENT)
Dept: CARDIOLOGY | Facility: CLINIC | Age: 78
End: 2018-03-08

## 2018-03-09 NOTE — PROGRESS NOTES
Discharge follow up post PCI:    What does the site look like?  Review:femoral site  It is normal to have soreness and or bruising at the puncture site and mild tingling in your hand for up to 3 days. The site should be flat and dry.     Groin     No heavy lifting (10 pounds) for 5-7 days.                           Reviewed, site is flat and dry, tender to touch    Call your doctor if:    You have a large or growing hard lump around the site.    The site is red, swollen, hot or tender.    Blood or fluid is draining from the site.    You have chills or a fever greater than 101 F (38 C).    Your leg or arm feels numb or cool.    You have hives, a rash or unusual itching.  reviewed    Are you having any pain? Tender groin, has more anxiety than pain    How is your activity tolerance?    For 2 days, do NOT have sex or do any heavy exercise.  Do you have someone at home to assist you with your daily activities?  reviewed     Review Medications: Dual antiplatelet therapy  If you have started taking Plavix do not stop taking it until you talk to your heart doctor (cardiologist). Dual antiplatelet therapy for one year  If you are on metformin (Glucophage), do not restart it until you have blood tests (within 2 to 3 days after discharge). When your doctor tells you it is safe, you may restart the metformin. Resume metformin   If you have stopped any other medicines, check with your nurse or provider about when to restart them.  Review Nitroglycerin instructions, take one tablet under the tongue for chest pain, if there is no relief take another one 5 minutes apart. If you still have no relief from the chest pain, call 911.    Have you scheduled with Cardiac Rehab? yes     FOLLOW UP  Do you have a follow up appointment with your provider?  Not yet, he will call his PCP tomorrow  What other discharge instructions do you have?   none  Are you to get labs, procedures or tests before you see your provider? none      You are  scheduled to see Dr. Valdes April 9th         CONTACT INFORMATION  Please feel free to call us with any other questions or symptoms that are concerning for you at [phone] if it is after 4:30 in the afternoon, or a weekend please call [phone] and ask for the on call specialist.  We want to do everything we can to help prevent you needing to return to the ED, so please do not hesitate to call us.

## 2018-03-12 ENCOUNTER — ANTICOAGULATION THERAPY VISIT (OUTPATIENT)
Dept: ANTICOAGULATION | Facility: CLINIC | Age: 78
End: 2018-03-12

## 2018-03-12 DIAGNOSIS — I48.20 CHRONIC ATRIAL FIBRILLATION (H): ICD-10-CM

## 2018-03-12 DIAGNOSIS — Z79.01 LONG-TERM (CURRENT) USE OF ANTICOAGULANTS: ICD-10-CM

## 2018-03-12 DIAGNOSIS — I48.91 ATRIAL FIBRILLATION (H): ICD-10-CM

## 2018-03-12 LAB — INR BLD: 1.3 (ref 0.86–1.14)

## 2018-03-12 PROCEDURE — 85610 PROTHROMBIN TIME: CPT | Mod: QW | Performed by: INTERNAL MEDICINE

## 2018-03-12 PROCEDURE — 36416 COLLJ CAPILLARY BLOOD SPEC: CPT | Performed by: INTERNAL MEDICINE

## 2018-03-12 NOTE — PROGRESS NOTES
ANTICOAGULATION FOLLOW-UP CLINIC VISIT    Patient Name:  Tad Manning  Date:  3/12/2018  Contact Type:  Telephone    SUBJECTIVE:     Patient Findings     Positives Unexplained INR or factor level change (INR is subtherapeutic 1.3 today.)    Comments Spoke to [facility].  Patient was recently discharged post stent placement.  He takes Plavix daily.  Recommended 5mg today, tomorrow and Thursday.  2.5mg on Wednesday and check on Friday.  No changes in health, diet or medications.           OBJECTIVE    INR Point of Care   Date Value Ref Range Status   03/12/2018 1.3 (H) 0.86 - 1.14 Final     Comment:     This test is intended for monitoring Coumadin therapy.  Results are not   accurate in patients with prolonged INR due to factor deficiency.         ASSESSMENT / PLAN  INR assessment SUB    Recheck INR In: 4 DAYS    INR Location Clinic      Anticoagulation Summary as of 3/12/2018     INR goal 2.0-3.0   Today's INR 1.3!   Maintenance plan No maintenance plan   Full instructions 3/12: 5 mg; 3/13: 5 mg; 3/14: 2.5 mg; 3/15: 5 mg; Otherwise No maintenance plan   Next INR check 3/16/2018   Priority INR   Target end date Indefinite    Indications   Atrial fibrillation (H) [I48.91] [I48.91]  Long-term (current) use of anticoagulants [Z79.01] [Z79.01]         Anticoagulation Episode Summary     INR check location     Preferred lab     Send INR reminders to [facility]    Comments HIPPA Infor returned. OK to leave message on cell [phone]. OK to leave messages with Seferino Manning [phone]  labs @ [facility] faxed there 2/16.  fax: [phone]      Anticoagulation Care Providers     Provider Role Specialty Phone number    Clarisse Valdes MD Responsible Cardiology [phone]            See the Encounter Report to view Anticoagulation Flowsheet and Dosing Calendar (Go to Encounters tab in chart review, and find the Anticoagulation Therapy Visit)    Spoke with patient. Gave them their lab results and  new warfarin recommendation.  No changes in health, medication, or diet. No missed doses, no falls. No signs or symptoms of bleed or clotting.    Darien Ballard, RN

## 2018-03-12 NOTE — MR AVS SNAPSHOT
Tad Manning   3/12/2018   Anticoagulation Therapy Visit    Description:  77 year old male   Provider:  Darien Ballard   Department:  [facility]           INR as of 3/12/2018     Today's INR 1.3!      Anticoagulation Summary as of 3/12/2018     INR goal 2.0-3.0   Today's INR 1.3!   Full instructions 3/12: 5 mg; 3/13: 5 mg; 3/14: 2.5 mg; 3/15: 5 mg; Otherwise No maintenance plan   Next INR check 3/16/2018    Indications   Atrial fibrillation (H) [I48.91] [I48.91]  Long-term (current) use of anticoagulants [Z79.01] [Z79.01]         March 2018 Details    Sun Mon Tue Wed Thu Fri Sat         1               2               3                 4               5               6               7               8               9               10                 11               12      5 mg   See details      13      5 mg         14      2.5 mg         15      5 mg         16            17                 18               19               20               21               22               23               24                 25               26               27               28               29               30               31                Date Details   03/12 This INR check       Date of next INR:  3/16/2018         How to take your warfarin dose     To take:  2.5 mg Take 0.5 of a 5 mg tablet.    To take:  5 mg Take 1 of the 5 mg tablets.

## 2018-03-16 ENCOUNTER — ANTICOAGULATION THERAPY VISIT (OUTPATIENT)
Dept: ANTICOAGULATION | Facility: CLINIC | Age: 78
End: 2018-03-16

## 2018-03-16 DIAGNOSIS — I48.91 ATRIAL FIBRILLATION (H): ICD-10-CM

## 2018-03-16 DIAGNOSIS — I48.20 CHRONIC ATRIAL FIBRILLATION (H): ICD-10-CM

## 2018-03-16 DIAGNOSIS — Z79.01 LONG-TERM (CURRENT) USE OF ANTICOAGULANTS: ICD-10-CM

## 2018-03-16 LAB — INR BLD: 1.2 (ref 0.86–1.14)

## 2018-03-16 PROCEDURE — 36416 COLLJ CAPILLARY BLOOD SPEC: CPT | Performed by: INTERNAL MEDICINE

## 2018-03-16 PROCEDURE — 85610 PROTHROMBIN TIME: CPT | Mod: QW | Performed by: INTERNAL MEDICINE

## 2018-03-16 NOTE — PROGRESS NOTES
ANTICOAGULATION FOLLOW-UP CLINIC VISIT    Patient Name:  Tad Manning  Date:  3/16/2018  Contact Type:  Telephone    SUBJECTIVE:     Patient Findings     Positives No Problem Findings, Unexplained INR or factor level change           OBJECTIVE    INR Point of Care   Date Value Ref Range Status   03/16/2018 1.2 (H) 0.86 - 1.14 Final     Comment:     This test is intended for monitoring Coumadin therapy.  Results are not   accurate in patients with prolonged INR due to factor deficiency.         ASSESSMENT / PLAN  INR assessment SUB    Recheck INR In: 5 DAYS    INR Location Clinic      Anticoagulation Summary as of 3/16/2018     INR goal 2.0-3.0   Today's INR 1.2!   Maintenance plan No maintenance plan   Full instructions 3/16: 5 mg; 3/17: 5 mg; 3/18: 5 mg; 3/19: 5 mg; 3/20: 5 mg; Otherwise No maintenance plan   Next INR check 3/21/2018   Priority INR   Target end date Indefinite    Indications   Atrial fibrillation (H) [I48.91] [I48.91]  Long-term (current) use of anticoagulants [Z79.01] [Z79.01]         Anticoagulation Episode Summary     INR check location     Preferred lab     Send INR reminders to [facility]    Comments HIPPA Infor returned. OK to leave message on cell [phone]. OK to leave messages with Seferino Manning [phone]  labs @ [facility] faxed there 2/16.  fax: [phone]  Goes by [facility]      Anticoagulation Care Providers     Provider Role Specialty Phone number    Clarisse Valdes MD Responsible Cardiology [phone]            See the Encounter Report to view Anticoagulation Flowsheet and Dosing Calendar (Go to Encounters tab in chart review, and find the Anticoagulation Therapy Visit)    Spoke with patient. Gave them their lab results and new warfarin recommendation.  No changes in health, medication, or diet. No missed doses, no falls. No signs or symptoms of bleed or clotting.     Ellen Santizo RN

## 2018-03-16 NOTE — MR AVS SNAPSHOT
Tad Manning   3/16/2018   Anticoagulation Therapy Visit    Description:  77 year old male   Provider:  Ellen Santizo, RN   Department:  [facility]           INR as of 3/16/2018     Today's INR 1.2!      Anticoagulation Summary as of 3/16/2018     INR goal 2.0-3.0   Today's INR 1.2!   Full instructions 3/16: 5 mg; 3/17: 5 mg; 3/18: 5 mg; 3/19: 5 mg; 3/20: 5 mg; Otherwise No maintenance plan   Next INR check 3/21/2018    Indications   Atrial fibrillation (H) [I48.91] [I48.91]  Long-term (current) use of anticoagulants [Z79.01] [Z79.01]         March 2018 Details    Sun Mon Tue Wed Thu Fri Sat         1               2               3                 4               5               6               7               8               9               10                 11               12               13               14               15               16      5 mg   See details      17      5 mg           18      5 mg         19      5 mg         20      5 mg         21            22               23               24                 25               26               27               28               29               30               31                Date Details   03/16 This INR check       Date of next INR:  3/21/2018         How to take your warfarin dose     To take:  5 mg Take 1 of the 5 mg tablets.

## 2018-03-21 ENCOUNTER — ANTICOAGULATION THERAPY VISIT (OUTPATIENT)
Dept: ANTICOAGULATION | Facility: CLINIC | Age: 78
End: 2018-03-21

## 2018-03-21 DIAGNOSIS — I48.20 CHRONIC ATRIAL FIBRILLATION (H): ICD-10-CM

## 2018-03-21 DIAGNOSIS — I48.91 ATRIAL FIBRILLATION (H): ICD-10-CM

## 2018-03-21 DIAGNOSIS — Z79.01 LONG-TERM (CURRENT) USE OF ANTICOAGULANTS: ICD-10-CM

## 2018-03-21 LAB — INR PPP: 2.01 (ref 0.86–1.14)

## 2018-03-21 PROCEDURE — 85610 PROTHROMBIN TIME: CPT | Performed by: INTERNAL MEDICINE

## 2018-03-21 PROCEDURE — 36415 COLL VENOUS BLD VENIPUNCTURE: CPT | Performed by: INTERNAL MEDICINE

## 2018-03-21 NOTE — PROGRESS NOTES
ANTICOAGULATION FOLLOW-UP CLINIC VISIT    Patient Name:  Tad Manning  Date:  3/21/2018  Contact Type:  Telephone    SUBJECTIVE:     Patient Findings     Comments The next INR is requested on 3/26, but pt reports he will not have a ride on that day.  The INR has risen nicely            OBJECTIVE    INR   Date Value Ref Range Status   03/21/2018 2.01 (H) 0.86 - 1.14 Final       ASSESSMENT / PLAN  INR assessment THER    Recheck INR In: 6 DAYS    INR Location Clinic      Anticoagulation Summary as of 3/21/2018     INR goal 2.0-3.0   Today's INR 2.01   Maintenance plan No maintenance plan   Full instructions 3/21: 5 mg; 3/22: 2.5 mg; 3/23: 5 mg; 3/24: 5 mg; 3/25: 2.5 mg; 3/26: 5 mg; Otherwise No maintenance plan   Next INR check 3/27/2018   Priority INR   Target end date Indefinite    Indications   Atrial fibrillation (H) [I48.91] [I48.91]  Long-term (current) use of anticoagulants [Z79.01] [Z79.01]         Anticoagulation Episode Summary     INR check location     Preferred lab     Send INR reminders to [facility]    Comments HIPPA Infor returned. OK to leave message on cell [phone]. OK to leave messages with Seferino Manning [phone]  labs @ [facility] faxed there 2/16.  fax: [phone]  Goes by [facility]      Anticoagulation Care Providers     Provider Role Specialty Phone number    Clarisse Valdes MD Responsible Cardiology [phone]            See the Encounter Report to view Anticoagulation Flowsheet and Dosing Calendar (Go to Encounters tab in chart review, and find the Anticoagulation Therapy Visit)    Spoke with patient. Gave them their lab results and new warfarin recommendation.  No changes in health, medication, or diet. No missed doses, no falls. No signs or symptoms of bleed or clotting.      Zeinab Vargas RN

## 2018-03-21 NOTE — MR AVS SNAPSHOT
Tad Manning   3/21/2018   Anticoagulation Therapy Visit    Description:  77 year old male   Provider:  Zeinab Vargas, RN   Department:  [facility]           INR as of 3/21/2018     Today's INR 2.01      Anticoagulation Summary as of 3/21/2018     INR goal 2.0-3.0   Today's INR 2.01   Full instructions 3/21: 5 mg; 3/22: 2.5 mg; 3/23: 5 mg; 3/24: 5 mg; 3/25: 2.5 mg; 3/26: 5 mg; Otherwise No maintenance plan   Next INR check 3/27/2018    Indications   Atrial fibrillation (H) [I48.91] [I48.91]  Long-term (current) use of anticoagulants [Z79.01] [Z79.01]         March 2018 Details    Sun Mon Tue Wed Thu Fri Sat         1               2               3                 4               5               6               7               8               9               10                 11               12               13               14               15               16               17                 18               19               20               21      5 mg   See details      22      2.5 mg         23      5 mg         24      5 mg           25      2.5 mg         26      5 mg         27            28               29               30               31                Date Details   03/21 This INR check       Date of next INR:  3/27/2018         How to take your warfarin dose     To take:  2.5 mg Take 0.5 of a 5 mg tablet.    To take:  5 mg Take 1 of the 5 mg tablets.

## 2018-03-24 DIAGNOSIS — I48.91 ATRIAL FIBRILLATION (H): Primary | ICD-10-CM

## 2018-03-24 DIAGNOSIS — E78.5 HYPERLIPIDEMIA LDL GOAL <100: ICD-10-CM

## 2018-03-27 ENCOUNTER — ANTICOAGULATION THERAPY VISIT (OUTPATIENT)
Dept: ANTICOAGULATION | Facility: CLINIC | Age: 78
End: 2018-03-27

## 2018-03-27 DIAGNOSIS — I48.91 ATRIAL FIBRILLATION (H): ICD-10-CM

## 2018-03-27 DIAGNOSIS — I48.20 CHRONIC ATRIAL FIBRILLATION (H): ICD-10-CM

## 2018-03-27 DIAGNOSIS — Z79.01 LONG-TERM (CURRENT) USE OF ANTICOAGULANTS: ICD-10-CM

## 2018-03-27 LAB — INR BLD: 1.6 (ref 0.86–1.14)

## 2018-03-27 PROCEDURE — 36416 COLLJ CAPILLARY BLOOD SPEC: CPT | Performed by: FAMILY MEDICINE

## 2018-03-27 PROCEDURE — 85610 PROTHROMBIN TIME: CPT | Mod: QW | Performed by: FAMILY MEDICINE

## 2018-03-27 NOTE — MR AVS SNAPSHOT
Tad Tim Manning   3/27/2018   Anticoagulation Therapy Visit    Description:  77 year old male   Provider:  Ellen Santizo, RN   Department:  [facility]           INR as of 3/27/2018     Today's INR 1.6!      Anticoagulation Summary as of 3/27/2018     INR goal 2.0-3.0   Today's INR 1.6!   Full instructions 3/27: 5 mg; 3/28: 5 mg; 3/29: 5 mg; 3/30: 5 mg; 3/31: 5 mg; 4/1: 5 mg; 4/2: 5 mg; Otherwise No maintenance plan   Next INR check 4/3/2018    Indications   Atrial fibrillation (H) [I48.91] [I48.91]  Long-term (current) use of anticoagulants [Z79.01] [Z79.01]         March 2018 Details    Sun Mon Tue Wed Thu Fri Sat         1               2               3                 4               5               6               7               8               9               10                 11               12               13               14               15               16               17                 18               19               20               21               22               23               24                 25               26               27      5 mg   See details      28      5 mg         29      5 mg         30      5 mg         31      5 mg          Date Details   03/27 This INR check               How to take your warfarin dose     To take:  5 mg Take 1 of the 5 mg tablets.           April 2018 Details    Sun Mon Tue Wed Thu Fri Sat     1      5 mg         2      5 mg         3            4               5               6               7                 8               9               10               11               12               13               14                 15               16               17               18               19               20               21                 22               23               24               25               26               27               28                 29               30                     Date Details   No additional  details    Date of next INR:  4/3/2018         How to take your warfarin dose     To take:  5 mg Take 1 of the 5 mg tablets.

## 2018-03-27 NOTE — PROGRESS NOTES
ANTICOAGULATION FOLLOW-UP CLINIC VISIT    Patient Name:  Tad Manning  Date:  3/27/2018  Contact Type:  Telephone    SUBJECTIVE:        OBJECTIVE    INR Point of Care   Date Value Ref Range Status   03/27/2018 1.6 (H) 0.86 - 1.14 Final     Comment:     This test is intended for monitoring Coumadin therapy.  Results are not   accurate in patients with prolonged INR due to factor deficiency.         ASSESSMENT / PLAN  INR assessment SUB    Recheck INR In: 1 WEEK    INR Location Clinic      Anticoagulation Summary as of 3/27/2018     INR goal 2.0-3.0   Today's INR 1.6!   Maintenance plan No maintenance plan   Full instructions 3/27: 5 mg; 3/28: 5 mg; 3/29: 5 mg; 3/30: 5 mg; 3/31: 5 mg; 4/1: 5 mg; 4/2: 5 mg; Otherwise No maintenance plan   Next INR check 4/3/2018   Priority INR   Target end date Indefinite    Indications   Atrial fibrillation (H) [I48.91] [I48.91]  Long-term (current) use of anticoagulants [Z79.01] [Z79.01]         Anticoagulation Episode Summary     INR check location     Preferred lab     Send INR reminders to [facility]    Comments HIPPA Infor returned. OK to leave message on cell [phone]. OK to leave messages with Seferino Manning [phone]  labs @ [facility] faxed there 2/16.  fax: [phone]  Goes by [facility]      Anticoagulation Care Providers     Provider Role Specialty Phone number    Clarisse Valdes MD Responsible Cardiology [phone]            See the Encounter Report to view Anticoagulation Flowsheet and Dosing Calendar (Go to Encounters tab in chart review, and find the Anticoagulation Therapy Visit)    Left message for patient with results and dosing recommendations. Asked patient to call back to report any missed doses, falls, signs and symptoms of bleeding or clotting, any changes in health, medication, or diet. Asked patient to call back with any questions or concerns.     Ellen Santizo RN

## 2018-04-03 ENCOUNTER — ANTICOAGULATION THERAPY VISIT (OUTPATIENT)
Dept: ANTICOAGULATION | Facility: CLINIC | Age: 78
End: 2018-04-03

## 2018-04-03 DIAGNOSIS — I48.20 CHRONIC ATRIAL FIBRILLATION (H): ICD-10-CM

## 2018-04-03 DIAGNOSIS — Z79.01 LONG-TERM (CURRENT) USE OF ANTICOAGULANTS: ICD-10-CM

## 2018-04-03 DIAGNOSIS — I48.91 ATRIAL FIBRILLATION (H): ICD-10-CM

## 2018-04-03 LAB — INR BLD: 1.7 (ref 0.86–1.14)

## 2018-04-03 PROCEDURE — 36416 COLLJ CAPILLARY BLOOD SPEC: CPT | Performed by: INTERNAL MEDICINE

## 2018-04-03 PROCEDURE — 85610 PROTHROMBIN TIME: CPT | Mod: QW | Performed by: INTERNAL MEDICINE

## 2018-04-03 NOTE — PROGRESS NOTES
ANTICOAGULATION FOLLOW-UP CLINIC VISIT    Patient Name:  Tad Manning  Date:  4/3/2018  Contact Type:  Telephone    SUBJECTIVE:     Patient Findings     Positives Missed doses    Comments Spoke with patient (goes by [facility]) and he stated he missed his dose on Easter Sunday because he was traveling. No other problems.            OBJECTIVE    INR Point of Care   Date Value Ref Range Status   04/03/2018 1.7 (H) 0.86 - 1.14 Final     Comment:     This test is intended for monitoring Coumadin therapy.  Results are not   accurate in patients with prolonged INR due to factor deficiency.         ASSESSMENT / PLAN  INR assessment SUB    Recheck INR In: 1 WEEK    INR Location Clinic      Anticoagulation Summary as of 4/3/2018     INR goal 2.0-3.0   Today's INR 1.7!   Maintenance plan No maintenance plan   Full instructions 4/3: 7.5 mg; 4/4: 5 mg; 4/5: 5 mg; 4/6: 5 mg; 4/7: 5 mg; 4/8: 5 mg; 4/9: 5 mg; Otherwise No maintenance plan   Next INR check 4/10/2018   Priority INR   Target end date Indefinite    Indications   Atrial fibrillation (H) [I48.91] [I48.91]  Long-term (current) use of anticoagulants [Z79.01] [Z79.01]         Anticoagulation Episode Summary     INR check location     Preferred lab     Send INR reminders to [facility]    Comments HIPPA Infor returned. OK to leave message on cell [phone]. OK to leave messages with Seferino Manning [phone]  labs @ [facility] faxed there 2/16.  fax: [phone]  Goes by [facility]      Anticoagulation Care Providers     Provider Role Specialty Phone number    Clarisse Valdes MD Responsible Cardiology [phone]            See the Encounter Report to view Anticoagulation Flowsheet and Dosing Calendar (Go to Encounters tab in chart review, and find the Anticoagulation Therapy Visit)    Spoke with patient. Gave them their lab results and new warfarin recommendation.  No changes in health, medication, or diet. No missed doses, no falls. No signs or  symptoms of bleed or clotting. He will take a slightly increased dose today to help cover for his missed dose Sunday and continue on 5 mg after that. He will return in 1 week for an INR recheck.       Pinky Thakkar RPH

## 2018-04-03 NOTE — MR AVS SNAPSHOT
Tad Manning   4/3/2018   Anticoagulation Therapy Visit    Description:  77 year old male   Provider:  Pinky Thakkar [facility]   Department:  [facility]           INR as of 4/3/2018     Today's INR 1.7!      Anticoagulation Summary as of 4/3/2018     INR goal 2.0-3.0   Today's INR 1.7!   Full instructions 4/3: 7.5 mg; 4/4: 5 mg; 4/5: 5 mg; 4/6: 5 mg; 4/7: 5 mg; 4/8: 5 mg; 4/9: 5 mg; Otherwise No maintenance plan   Next INR check 4/10/2018    Indications   Atrial fibrillation (H) [I48.91] [I48.91]  Long-term (current) use of anticoagulants [Z79.01] [Z79.01]         April 2018 Details    Sun Mon Tue Wed Thu Fri Sat     1               2               3      7.5 mg   See details      4      5 mg         5      5 mg         6      5 mg         7      5 mg           8      5 mg         9      5 mg         10            11               12               13               14                 15               16               17               18               19               20               21                 22               23               24               25               26               27               28                 29               30                     Date Details   04/03 This INR check       Date of next INR:  4/10/2018         How to take your warfarin dose     To take:  5 mg Take 1 of the 5 mg tablets.    To take:  7.5 mg Take 1.5 of the 5 mg tablets.

## 2018-04-09 ENCOUNTER — ANTICOAGULATION THERAPY VISIT (OUTPATIENT)
Dept: ANTICOAGULATION | Facility: CLINIC | Age: 78
End: 2018-04-09

## 2018-04-09 ENCOUNTER — OFFICE VISIT (OUTPATIENT)
Dept: CARDIOLOGY | Facility: CLINIC | Age: 78
End: 2018-04-09
Attending: INTERNAL MEDICINE
Payer: MEDICARE

## 2018-04-09 VITALS
WEIGHT: 201.7 LBS | BODY MASS INDEX: 27.32 KG/M2 | SYSTOLIC BLOOD PRESSURE: 114 MMHG | OXYGEN SATURATION: 95 % | DIASTOLIC BLOOD PRESSURE: 76 MMHG | HEIGHT: 72 IN | HEART RATE: 73 BPM

## 2018-04-09 DIAGNOSIS — Z79.01 LONG-TERM (CURRENT) USE OF ANTICOAGULANTS: ICD-10-CM

## 2018-04-09 DIAGNOSIS — I48.91 ATRIAL FIBRILLATION (H): ICD-10-CM

## 2018-04-09 DIAGNOSIS — I50.23 ACUTE ON CHRONIC SYSTOLIC CONGESTIVE HEART FAILURE (H): Primary | ICD-10-CM

## 2018-04-09 DIAGNOSIS — Z98.61 CAD S/P PERCUTANEOUS CORONARY ANGIOPLASTY: ICD-10-CM

## 2018-04-09 DIAGNOSIS — I48.20 CHRONIC ATRIAL FIBRILLATION (H): ICD-10-CM

## 2018-04-09 DIAGNOSIS — I25.10 CAD S/P PERCUTANEOUS CORONARY ANGIOPLASTY: ICD-10-CM

## 2018-04-09 DIAGNOSIS — E78.2 MIXED HYPERLIPIDEMIA: ICD-10-CM

## 2018-04-09 DIAGNOSIS — E78.5 HYPERLIPIDEMIA LDL GOAL <100: ICD-10-CM

## 2018-04-09 DIAGNOSIS — I73.9 CLAUDICATION OF LEFT LOWER EXTREMITY (H): ICD-10-CM

## 2018-04-09 LAB
ALBUMIN SERPL-MCNC: 3.4 G/DL (ref 3.4–5)
ALP SERPL-CCNC: 134 U/L (ref 40–150)
ALT SERPL W P-5'-P-CCNC: 33 U/L (ref 0–70)
ANION GAP SERPL CALCULATED.3IONS-SCNC: 8 MMOL/L (ref 3–14)
AST SERPL W P-5'-P-CCNC: 17 U/L (ref 0–45)
BILIRUB SERPL-MCNC: 1.4 MG/DL (ref 0.2–1.3)
BUN SERPL-MCNC: 30 MG/DL (ref 7–30)
CALCIUM SERPL-MCNC: 9.2 MG/DL (ref 8.5–10.1)
CHLORIDE SERPL-SCNC: 107 MMOL/L (ref 94–109)
CHOLEST SERPL-MCNC: 65 MG/DL
CO2 SERPL-SCNC: 24 MMOL/L (ref 20–32)
CREAT SERPL-MCNC: 1.06 MG/DL (ref 0.66–1.25)
GFR SERPL CREATININE-BSD FRML MDRD: 68 ML/MIN/1.7M2
GLUCOSE SERPL-MCNC: 129 MG/DL (ref 70–99)
HDLC SERPL-MCNC: 30 MG/DL
INR PPP: 3.22 (ref 0.86–1.14)
LDLC SERPL CALC-MCNC: 23 MG/DL
NONHDLC SERPL-MCNC: 35 MG/DL
POTASSIUM SERPL-SCNC: 4.7 MMOL/L (ref 3.4–5.3)
PROT SERPL-MCNC: 6.5 G/DL (ref 6.8–8.8)
SODIUM SERPL-SCNC: 139 MMOL/L (ref 133–144)
TRIGL SERPL-MCNC: 58 MG/DL

## 2018-04-09 PROCEDURE — 99214 OFFICE O/P EST MOD 30 MIN: CPT | Mod: GC | Performed by: INTERNAL MEDICINE

## 2018-04-09 PROCEDURE — 36415 COLL VENOUS BLD VENIPUNCTURE: CPT | Performed by: INTERNAL MEDICINE

## 2018-04-09 PROCEDURE — 80053 COMPREHEN METABOLIC PANEL: CPT | Performed by: INTERNAL MEDICINE

## 2018-04-09 PROCEDURE — G0463 HOSPITAL OUTPT CLINIC VISIT: HCPCS | Mod: 25,ZF

## 2018-04-09 PROCEDURE — 80061 LIPID PANEL: CPT | Performed by: INTERNAL MEDICINE

## 2018-04-09 PROCEDURE — 85610 PROTHROMBIN TIME: CPT | Performed by: INTERNAL MEDICINE

## 2018-04-09 PROCEDURE — 93010 ELECTROCARDIOGRAM REPORT: CPT | Mod: ZP | Performed by: INTERNAL MEDICINE

## 2018-04-09 PROCEDURE — 93005 ELECTROCARDIOGRAM TRACING: CPT | Mod: ZF

## 2018-04-09 RX ORDER — ECHINACEA PURPUREA EXTRACT 125 MG
1 TABLET ORAL DAILY PRN
Qty: 1 BOTTLE | Refills: 1 | Status: SHIPPED | OUTPATIENT
Start: 2018-04-09 | End: 2021-08-26

## 2018-04-09 RX ORDER — ATORVASTATIN CALCIUM 20 MG/1
TABLET, FILM COATED ORAL
Qty: 90 TABLET | Refills: 3 | Status: SHIPPED | OUTPATIENT
Start: 2018-04-09 | End: 2019-06-25

## 2018-04-09 RX ORDER — ATORVASTATIN CALCIUM 20 MG/1
20 TABLET, FILM COATED ORAL DAILY
Qty: 30 TABLET | Refills: 3 | Status: SHIPPED | OUTPATIENT
Start: 2018-04-09 | End: 2018-04-09

## 2018-04-09 ASSESSMENT — PAIN SCALES - GENERAL: PAINLEVEL: NO PAIN (0)

## 2018-04-09 NOTE — MR AVS SNAPSHOT
"              After Visit Summary   2018    Tad Manning    MRN: [id number]           Patient Information     Date Of Birth          1940        Visit Information        Provider Department      2018 10:30 AM Clarisse Valdes MD [facility]        Today's Diagnoses     Atrial fibrillation (H)    -  1    Chronic systolic heart failure (H)        Nasal congestion        Claudication of left lower extremity (H)          Care Instructions    You were seen today in the Cardiovascular Clinic at the [facility].      Cardiology Providers you saw during your visit:   Recommendations:    1. Decrease Lipitor to 20 mg daily.  2. Increase Lasix to 40 mg daily.   3. Order already placed for Cardiac rehab - we can fax to your preferred location.   4. Saline Nasal Spray ordered   5. Nurse visit on Monday with us in clinic     Follow-up:    3. Follow up with Dr Valdes in 3 months     For emergencies call 911.     For any scheduling needs or to speak to a triage nurse, please call [phone].     Thank you for your visit today! If you have questions or concerns about today's visit, please call me.     Verónica Jarquin RN CHFN  Nurse Coordinator  Questions and [phone]   First press #1 for the [employer] and then press #3 for \"Medical Advice\" to reach a Cardiology Nurse.            Follow-ups after your visit        Additional Services     Follow-Up with Cardiologist                 Your next 10 appointments already scheduled     Apr 10, 2018 10:40 AM CDT   LAB with LAB FIRST FLOOR [facility] ([facility])    [address] [place] [address] [phone]           Please do not eat 10-12 hours before your appointment if you are coming in fasting for labs on lipids, cholesterol, or glucose (sugar). This does not apply to pregnant women. Water, hot tea and black coffee (with nothing added) are okay. Do not drink other " fluids, diet soda or chew gum.            Apr 16, 2018 10:00 AM CDT   (Arrive by 9:45 AM)   CT THORACIC SPINE W/O CONTRAST with UCCT1   [facility] CT ([facility])    [address] [id number] [phone]           Please bring any scans or X-rays taken at other hospitals, if similar tests were done. Also bring a list of your medicines, including vitamins, minerals and over-the-counter drugs. It is safest to leave personal items at home.  Be sure to tell your doctor:   If you have any allergies.   If there s any chance you are pregnant.   If you are breastfeeding.  You do not need to do anything special to prepare for this exam.  Please wear loose clothing, such as a sweat suit or jogging clothes. Avoid snaps, zippers and other metal. We may ask you to undress and put on a hospital gown.            Apr 16, 2018 10:30 AM CDT   XR THORACIC/LUMBAR STANDING 2 VIEWS (SCOLI) with UCXR4   [facility] Xray ([facility])    [address] [place] [id number] [phone]           Please bring a list of your current medicines to your exam. (Include vitamins, minerals and over-thecounter medicines.) Leave your valuables at home.  Tell your doctor if there is a chance you may be pregnant.  You do not need to do anything special for this exam.            Apr 16, 2018 11:15 AM CDT   (Arrive by 11:00 AM)   Return Visit with Marcelo Trent MD   [facility] Neurosurgery ([facility])    [address] [place] [address]  3rd Floor  [facility] [id number] [phone]            Apr 16, 2018 12:00 PM CDT   Nurse Visit with  Cv Nurse   [facility] Heart Care ([facility])    [address] [place] [address]  [address]  [facility] [id number] [phone]            Jul 16, 2018 11:00 AM CDT   (Arrive by 10:45 AM)   Return Visit with Clarisse Valdes MD   [facility]  [facility] ([facility])    [address] [facility]  [address]  [facility] [address] [phone]              Future tests that were ordered for you today     Open Future Orders        Priority Expected Expires Ordered    Follow-Up with Cardiologist Routine 7/8/2018 10/6/2018 4/9/2018            Who to contact     If you have questions or need follow up information about today's clinic visit or your schedule please contact [facility] directly at [phone].  Normal or non-critical lab and imaging results will be communicated to you by LogFirehart, letter or phone within 4 business days after the clinic has received the results. If you do not hear from us within 7 days, please contact the clinic through Raftert or phone. If you have a critical or abnormal lab result, we will notify you by phone as soon as possible.  Submit refill requests through PresentationTube or call your pharmacy and they will forward the refill request to us. Please allow 3 business days for your refill to be completed.          Additional Information About Your Visit        PresentationTube Information     PresentationTube gives you secure access to your electronic health record. If you see a primary care provider, you can also send messages to your care team and make appointments. If you have questions, please call your primary care clinic.  If you do not have a primary care provider, please call [phone] and they will assist you.        Care EveryWhere ID     This is your Care EveryWhere ID. This could be used by other organizations to access your [place] medical records  [id number]        Your Vitals Were     Pulse Height Pulse Oximetry BMI (Body Mass Index)          73 1.829 m (6') 95% 27.36 kg/m2         Blood Pressure from Last 3 Encounters:   04/09/18 114/76   03/07/18 124/73   02/09/18 127/74    Weight from Last 3 Encounters:   04/09/18 91.5 kg (201 lb 11.2 oz)   03/06/18 96 kg (211 lb 10.3 oz)   02/09/18 95.3 kg (210 lb 1.6  oz)              We Performed the Following     EKG 12-lead, tracing only (Same Day)     Follow-Up with Cardiologist          Today's Medication Changes          These changes are accurate as of 4/9/18 11:47 AM.  If you have any questions, ask your nurse or doctor.               These medicines have changed or have updated prescriptions.        Dose/Directions    atorvastatin 20 MG tablet   Commonly known as:  LIPITOR   This may have changed:    - medication strength  - how much to take   Used for:  Claudication of left lower extremity (H)   Changed by:  Clarisse Valdes MD        Dose:  20 mg   Take 1 tablet (20 mg) by mouth daily   Quantity:  30 tablet   Refills:  3            Where to get your medicines      These medications were sent to [facility] [id number] [place], [place] [address] [facility] [place] [address]  [address] [facility] [address]     Phone:  [phone]     atorvastatin 20 MG tablet                Primary Care Provider Office Phone # Fax #    Gene Guevara [phone]       [address] [address]        Equal Access to Services     KULWINDER ELLISON AH: Hadii liana ku hadasho Soomaali, waaxda luqadaha, qaybta kaalmada adeegyada, deandre howard . So [facility] [phone].    ATENCIÓN: Si habla español, tiene a noe disposición servicios gratuitos de asistencia lingüística. [facility] [phone].    We comply with applicable federal civil rights laws and [place] laws. We do not discriminate on the basis of race, color, national origin, age, disability, sex, sexual orientation, or gender identity.            Thank you!     Thank you for choosing [facility]  for your care. Our goal is always to provide you with excellent care. Hearing back from our patients is one way we can continue to improve our services. Please take a few minutes to complete the written survey that you may receive in the  mail after your visit with us. Thank you!             Your Updated Medication List - Protect others around you: Learn how to safely use, store and throw away your medicines at www.disposemymeds.org.          This list is accurate as of 4/9/18 11:47 AM.  Always use your most recent med list.                   Brand Name Dispense Instructions for use Diagnosis    acetaminophen 325 MG tablet    TYLENOL     Take 325 mg by mouth every 4 hours as needed for mild pain        ALPRAZolam 0.25 MG tablet    XANAX     TK 1 T PO QHS PRN    Epidural abscess       aspirin 81 MG tablet     60 tablet    Take 1 tablet (81 mg) by mouth daily Until INR level reaches 2.    Status post percutaneous transluminal coronary angioplasty, Coronary artery disease involving native coronary artery of native heart without angina pectoris       atorvastatin 20 MG tablet    LIPITOR    30 tablet    Take 1 tablet (20 mg) by mouth daily    Claudication of left lower extremity (H)       CELEXA PO      Take 10 mg by mouth daily        cholecalciferol 1000 UNITS Tabs     30 tablet    Take 1,000 Units by mouth daily    Loop diuretic causing adverse effect in therapeutic use, subsequent encounter       clopidogrel 75 MG tablet    PLAVIX    90 tablet    Take 1 tablet (75 mg) by mouth daily    Status post percutaneous transluminal coronary angioplasty, Coronary artery disease involving native coronary artery of native heart without angina pectoris       furosemide 20 MG tablet    LASIX    180 tablet    Furosemide 40 mg po daily    Chronic systolic heart failure (H)       lisinopril 10 MG tablet    PRINIVIL/ZESTRIL    30 tablet    Take 1 tablet (10 mg) by mouth daily        MELATONIN PO      Take 5 mg by mouth daily        metFORMIN 500 MG tablet    GLUCOPHAGE    60 tablet    Take 1 tablet (500 mg) by mouth 2 times daily (with meals)    Type 2 diabetes mellitus with diabetic polyneuropathy, without long-term current use of insulin (H)       metoprolol  succinate 200 MG 24 hr tablet    TOPROL-XL    90 tablet    Take 1 tablet (200 mg) by mouth daily    Chronic systolic heart failure (H)       nitroGLYcerin 0.4 MG sublingual tablet    NITROSTAT    25 tablet    For chest pain place 1 tablet under the tongue every 5 minutes for 3 doses. If symptoms persist 5 minutes after 1st dose call 911.    Atrial flutter, unspecified type (H)       nystatin cream    MYCOSTATIN     Apply topically 2 times daily        order for DME     1 Device    Equipment being ordered: [facility] shoe    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), Diabetic ulcer of left foot due to type 2 diabetes mellitus (H)       ranitidine 150 MG tablet    ZANTAC    60 tablet    Take 1 tablet (150 mg) by mouth 2 times daily    Gastroesophageal reflux disease without esophagitis       spironolactone 25 MG tablet    ALDACTONE    45 tablet    Take 0.5 tablets (12.5 mg) by mouth daily    Chronic systolic congestive heart failure (H)       tamsulosin 0.4 MG capsule    FLOMAX    60 capsule    Take 2 capsules (0.8 mg) by mouth At Bedtime    Renal insufficiency       warfarin 5 MG tablet    COUMADIN    90 tablet    Take 1 tablet (5 mg) by mouth daily Dose adjusted per Coumadin Clinic.    Paroxysmal atrial fibrillation (H)

## 2018-04-09 NOTE — PATIENT INSTRUCTIONS
"You were seen today in the Cardiovascular Clinic at the [facility].      Cardiology Providers you saw during your visit:   Recommendations:    1. Decrease Lipitor to 20 mg daily.  2. Increase Lasix to 40 mg daily.   3. Order already placed for Cardiac rehab - we can fax to your preferred location.   4. Saline Nasal Spray ordered   5. Nurse visit on Monday with us in clinic     Follow-up:    3. Follow up with Dr Valdes in 3 months     For emergencies call 911.     For any scheduling needs or to speak to a triage nurse, please call [phone].     Thank you for your visit today! If you have questions or concerns about today's visit, please call me.     Verónica Jarquin RN CHFN  Nurse Coordinator  Questions and [phone]   First press #1 for the [employer] and then press #3 for \"Medical Advice\" to reach a Cardiology Nurse.    "

## 2018-04-09 NOTE — NURSING NOTE
Chief Complaint   Patient presents with     Follow Up For     manage atrial fibrillation/labs prior to visit     Vitals were taken and medications were reconciled. EKG was performed.    Mary Carmen Logan MA    10:22 AM

## 2018-04-09 NOTE — NURSING NOTE
Diet: Patient instructed regarding a heart healthy diet, including discussion of reduced fat and sodium intake. Patient demonstrated understanding of this information and agreed to call with further questions or concerns.  Return Appointment: Patient given instructions regarding scheduling next clinic visit. Patient demonstrated understanding of this information and agreed to call with further questions or concerns.  Medication Change: Patient was educated regarding prescribed medication change, including discussion of the indication, administration, side effects, and when to report to MD or RN.   Given copy of cardiac rehab referral and he will stop in to schedule with a place 2 blocks from his house. He did not know the name of it for it to be faxed.   Per Dr Valdes nurse visit on Monday 4/16 to assess weight and see how feeling. She is in clinic that day.   Patient demonstrated understanding of this information and agreed to call with further questions or concerns.  Patient stated he understood all health information given and agreed to call with further questions or concerns.

## 2018-04-09 NOTE — LETTER
4/9/2018      RE: Tad Manning  [address] [address] [id number]       Dear Colleague,    Thank you for the opportunity to participate in the care of your patient, Tad Manning, at the [facility] at [facility]. Please see a copy of my visit note below.        Chief Complaint:  HFrEF/ICM    HPI:   Mr. Tad Manning is a 77-year old male with a past medical history of CLL in remission, CAD s/p CABG in 1995,HFrEF, atrial flutter, PVD s/p iliac artery stents and left lower extremity bypass Hypertension, diabetes, hyperlipidemia, discitis and multple admission for decompensated ICM/HFrEF who presented for follow-up of cardiomyopathy.     He was seen in our clinic for the last time in February 2018 and was hypervolemic. After diuresis, he underwent a LHC that showed moderate disease in his RCA, severe LAD disease and patent grafts and had two drug eluting stents placed in his mid LAD. RHC showed elevated right and left sided pressures with type II pulmonary hypertension.     ROS otherwise negative. Since then he has been feeling somewhat tired, he cant fall sleep feels anxious. He is in AFib his INR is 3.2 today after taking 7mg to increase it. He is taking aspirin and coumadin is not currently on plavix though it is unclear if he is taking it since at the beginning of the visit was confused on whether he should be on it. He felt really good two weeks after the procedure and states that after he started getting tired again is able to walk about 100 yards, no chest pain.     Cardiac meds  -asa  -furosemide 20mg OD  -lisinopril 10mg OD  -coumadin  - 200mg metoprolol XL OD  - spironolactone 12.5mg OD  - atorvastatin 40mg OD      Current Outpatient Prescriptions   Medication Sig Dispense Refill     aspirin 81 MG tablet Take 1 tablet (81 mg) by mouth daily Until INR level reaches 2. 60 tablet 3     furosemide (LASIX) 20 MG tablet Furosemide 40  mg po daily 180 tablet 3     MELATONIN PO Take 5 mg by mouth daily       lisinopril (PRINIVIL/ZESTRIL) 10 MG tablet Take 1 tablet (10 mg) by mouth daily 30 tablet      warfarin (COUMADIN) 5 MG tablet Take 1 tablet (5 mg) by mouth daily Dose adjusted per [facility] Clinic. 90 tablet 3     ALPRAZolam (XANAX) 0.25 MG tablet TK 1 T PO QHS PRN  1     acetaminophen (TYLENOL) 325 MG tablet Take 325 mg by mouth every 4 hours as needed for mild pain       metoprolol (TOPROL-XL) 200 MG 24 hr tablet Take 1 tablet (200 mg) by mouth daily 90 tablet 3     Citalopram Hydrobromide (CELEXA PO) Take 10 mg by mouth daily       spironolactone (ALDACTONE) 25 MG tablet Take 0.5 tablets (12.5 mg) by mouth daily 45 tablet 3     nystatin (MYCOSTATIN) cream Apply topically 2 times daily       atorvastatin (LIPITOR) 40 MG tablet Take 1 tablet (40 mg) by mouth daily 30 tablet 1     metFORMIN (GLUCOPHAGE) 500 MG tablet Take 1 tablet (500 mg) by mouth 2 times daily (with meals) 60 tablet      ranitidine (ZANTAC) 150 MG tablet Take 1 tablet (150 mg) by mouth 2 times daily 60 tablet      cholecalciferol 1000 UNITS TABS Take 1,000 Units by mouth daily 30 tablet      tamsulosin (FLOMAX) 0.4 MG capsule Take 2 capsules (0.8 mg) by mouth At Bedtime 60 capsule      nitroglycerin (NITROSTAT) 0.4 MG sublingual tablet For chest pain place 1 tablet under the tongue every 5 minutes for 3 doses. If symptoms persist 5 minutes after 1st dose call 911. 25 tablet      order for DME Equipment being ordered: 2 shoe 1 Device 0     clopidogrel (PLAVIX) 75 MG tablet Take 1 tablet (75 mg) by mouth daily 90 tablet 3       Past Medical History:   Diagnosis Date     Arthritis      ASCVD (arteriosclerotic cardiovascular disease)      BMI 30.0-30.9,adult      BPH (benign prostatic hypertrophy)      Cellulitis      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)      Coronary artery disease     triple bypass 1995     Diabetes mellitus (H)       Diabetic polyneuropathy (H)      History of blood transfusion      Hyperlipidaemia      Hypertension      Malignant neoplasm (H)     CLL     Noninflammatory pericardial effusion      Osteomyelitis of left foot (H)      PVD (peripheral vascular disease) (H)      Sebaceous cyst        Past Surgical History:   Procedure Laterality Date     AMPUTATE TOE(S)  1/10/2014    Procedure: AMPUTATE TOE(S);;  Surgeon: Amador Vick MD;  Location:  OR     BONE MARROW BIOPSY, BONE SPECIMEN, NEEDLE/TROCAR  10/19/2012    Procedure: BIOPSY BONE MARROW;  BONE MARROW BIOPSY  (CONSCIOUS SED);  Surgeon: Ramon Quesada MD;  Location:  GI     BYPASS GRAFT FEMOROPOPLITEAL  1/10/2014    Procedure: BYPASS GRAFT FEMOROPOPLITEAL;  Left above knee popliteal to  Below knee popliteal bypass using transverse saphenous vein graft. Left 2nd Toe amputation;  Surgeon: Amador Vikc MD;  Location:  OR     CARDIAC SURGERY      angioplasty with stent, triple bypass     EXCISE CYST GENERIC (LOCATION) N/A 2/1/2016    Procedure: EXCISE CYST GENERIC (LOCATION);  Surgeon: Amador Vick MD;  Location:  SD     GRAFT VEIN FROM EXTREMITY (LOCATION)  1/10/2014    Procedure: GRAFT VEIN FROM EXTREMITY (LOCATION);;  Surgeon: Amador Vick MD;  Location:  OR     LAMINECTOMY THORACIC ONE LEVEL N/A 2/8/2017    Procedure: LAMINECTOMY THORACIC ONE LEVEL;  Surgeon: Marcelo Trent MD;  Location: [facility] OR     OPTICAL TRACKING SYSTEM FUSION POSTERIOR SPINE THORACIC THREE+ LEVELS N/A 2/12/2017    Procedure: OPTICAL TRACKING SYSTEM FUSION POSTERIOR SPINE THORACIC THREE+ LEVELS;  Surgeon: Marcelo Trent MD;  Location: [facility] OR     TONSILLECTOMY       VASCULAR SURGERY      ptcr legs   T9-10 discitis with overlying epidural abscess  S/p laminectomy / abscess evacuation.  Critical lower limb ischemia , s/p L AK pop to BK pop bypass with rGSV on 1/10/2014  Family History   Problem Relation Age of Onset     CANCER Mother      leukemia        Social History   Substance Use Topics     Smoking status: Former Smoker     Packs/day: 2.00     Years: 30.00     Types: Cigarettes     Quit date: 6/19/1995     Smokeless tobacco: Never Used     Alcohol use Yes      Comment: 1 drink monthly       Allergies   Allergen Reactions     Blood Transfusion Related (Informational Only) Other (See Comments)     Patient has a history of a clinically significant antibody against RBC antigens.  A delay in compatible RBCs may occur.          ROS:   CONSTITUTIONAL:No report of fever, chills,  or change in weight  RESPIRATORY: No cough, wheezing, SOB, or hemoptysis  CARDIOVASCULAR: see HPI  MUSCULO-SKELETAL: No joint pain/swelling, no muslce pain  NEURO: No paresthesias, syncope, pre-syncope, light headness, dizziness or vertigo  ENDOCRINE: No temperature intolerance, no skin/hair changes  PSYCHIATRIC: No change in mood, feeling down/anxious, no change in sleep or appetite  GI: no melena or hematochezia, no change in bowel habits  : no hematuria or dysurea, no hesitancy, dribbling or incontinence  HEME: no easy bruising or bleeding, no history of anemia, no history of blood clots  SKIN: no rashes or sores, no unusual itching    Physical Examination:  Vitals: /76 (BP Location: Left arm, Patient Position: Chair, Cuff Size: Adult Regular)  Pulse 73  Ht 1.829 m (6')  Wt 91.5 kg (201 lb 11.2 oz)  SpO2 95%  BMI 27.36 kg/m2  BMI= Body mass index is 27.36 kg/(m^2).  Weight down to 91.5 Kg from 95K in Feb.     In general, the patient is a pleasant male in no apparent distress.      HEENT: NC/AT.  PERRLA.  EOMI.  Sclerae white, not injected.    Neck: Carotids 2+ bilaterally without bruits.  + jugular venous distension. No thyromegaly.   Heart: irregular rate and rhythm.  No murmur, rub, click, or gallop. There is no heave.    Lungs: Clear bilaterally.  No rhonchi, wheezes, rales.   Abdomen: Soft, nontender, nondistended.   Extremities: 1+ tense edema.  The pulses are 2+at  the radial and DP bilaterally.  Neuro: grossly non focal, gait normal.  Skin: no rashes.    Laboratory:    Chemistry panel:   Recent Labs   Lab Test  04/09/18   0951  03/07/18   0453   04/08/17   0927   04/06/17   0537   NA  139  140   < >  144   < >  144   POTASSIUM  4.7  3.7   < >  3.5   < >  3.6   CHLORIDE  107  105   < >  106   < >  108   CO2  24  25   < >  29   < >  25   ANIONGAP  8  10   < >  9   < >  10   GLC  129*  131*   < >  119*   < >  120*   BUN  30  26   < >  9   < >  10   CR  1.06  0.93   < >  0.63*   < >  0.63*   JUSTINO  9.2  8.6   < >  8.1*   < >  8.0*   MAG   --   1.7   --   1.4*   < >   --    GFRESTIMATED  68  79   < >  >90  Non  GFR Calc     < >  >90  Non  GFR Calc     AST  17   --    --    --    --   10   ALT  33   --    --    --    --   17    < > = values in this interval not displayed.       CBC:   Recent Labs   Lab Test  03/07/18 0453 03/06/18   0717   WBC  4.2  5.1   RBC  4.08*  4.51   HGB  11.4*  12.5*   HCT  36.8*  41.4   MCV  90  92   MCH  27.9  27.7   MCHC  31.0*  30.2*   RDW  17.8*  18.0*   PLT  98*  107*       Lipid Panel:  Recent Labs   Lab Test  04/09/18   0951  05/23/14   0745  01/10/14   0923   CHOL  65  134  92   HDL  30*  34*  28*   LDL  23  48  45   TRIG  58  257*  92   CHOLHDLRATIO   --   3.9  3.3       Thyroid:   No results found for: TSH  No results found for: T4  Hemoglobin A1C   Date Value Ref Range Status   02/10/2017 6.1 (H) 4.3 - 6.0 % Final     INR   Date Value Ref Range Status   04/09/2018 3.22 (H) 0.86 - 1.14 Final         Cardiac data:    ECG today shows atrial fibrillation that is well controlled, nonspecific T-wave abnormalities laterally.    RHC 3/2018    LC 3/2018    Echo 2/18  Interpretation Summary  Severe left ventricular dilation is present.  Biplane LV EF 20%.  Severe diffuse hypokinesis is present.  Mild to moderate right ventricular dilation is present.  Global right ventricular function is moderately to severely  reduced.  Dilation of the inferior vena cava is present with abnormal respiratory  variation in diameter.  No pericardial effusion is present.  A left pleural effusion is present.    ECG February 2018  Atrial fibrillation nonspecific ST-T abnormalities          EKG 04/06/17:    NSR Rate approx 125 bpm, LAD, no ischemic changes.    Echo (2/24/2017)  Technically difficult study.The patient's rhythm is atrial fibrillation.  The left ventricle is severely dilated. Left ventricular systolic function is  severely reduced, ejection fraction is estimated at 25-30%. There is severe  global hypokinesis.  Right ventricular systolic function is mildly reduced.  The right ventricular systolic pressure is approximated at 15 mmHg plus the  right atrial pressure. IVC is plethoric and estimated mean RA pressure is 15  mmHg.  No pericardial effusion present.      Assessment and recommendations:  Mr. Tad Manning is a 76-year old male with    1. HFrEF, stage C, NYHA 3  - severe LV dysfunction EF 20%    - lasix increase to 40mgOD until the end of the week  - on lisinopril, spironolactone, metoprolol, lasix    2. CAD, S/p CAB several years ago  - LHC-s/p CABG L-LAD, SVG-OM1, SVG-D1 in 1995  - s/p LHc in 3/2018 and PCI to mLAD  - continue plavix beta blocker and reduce atorvastatin from 40mg OD to 20mg OD  - d/c asa to keep on plavix and coumadin    3. Atrial fibrillation  - started on warfarin during last visit  - in AF rate controlled this visit  -continue 200mg metoprolol XL OD    4. Essential hypertension  - well controlled on current regimen    5. Dyslipidemia  -decrease atorvastatin to 20mg OD    6. DM Type II  -continue metformin    7. PAD with LE stents and bypass   -reinforced need for plavix  -continue statin  - exercise as able    2 weeks CORE clinic follow up    Seen and discussed with Dr. Lucio Mcrae  Cardiology fellow, PGY-4      ATTENDING ATTESTATION:  This patient has been seen and examined by  me April 9, 2018 with Dr. Thomas Mcrae. I have reviewed the vitals, laboratory and imaging data relevant to this patient's care. I have edited this note to reflect our joint assessment and plan, and discussed the plan with the patient.    Mr. Manning returns for follow-up.  He has ischemic cardiomyopathy and chronic systolic heart failure with an ejection fraction around 20%. He underwent a coronary angiogram in March 2018 and was noted to have patent grafts and his native  LAD had significant obstructive disease after the LIMA anastomosis which was intervened with placement of drug-eluting stents.  He was hospitalized overnight for diuresis given the elevated filling pressures.  Following the PCI he did well for a couple weeks and has started to notice more fatigue and exertional dyspnea now.    On exam he is slightly hypervolemic.  His blood pressure is in normal range.  His atrial fibrillation is well controlled. I have advised the higher dose of Lasix at 40 mg daily with daily weights for this week and return for recheck next Monday when he is here to see Dr. Trent.  His laboratory data reviewed today shows normal renal function.  He would also be a good candidate for digoxin which we will initiate at his next visit.  His lipids are also fairly low so I have reduced atorvastatin to 20 mg once daily.  We will arrange for outpatient cardiac rehab.  He was again advised to monitor closely for bleeding issues since he is on warfarin and Plavix.  Advise follow-up with me in 3 months and in the core clinic in the interim.     Clarisse Valdes MD, MS  Staff Cardiologist  Pager: [phone]

## 2018-04-09 NOTE — MR AVS SNAPSHOT
Tad Manning   4/9/2018   Anticoagulation Therapy Visit    Description:  77 year old male   Provider:  Pinky Thakkar [facility]   Department:  [facility]           INR as of 4/9/2018     Today's INR 3.22!      Anticoagulation Summary as of 4/9/2018     INR goal 2.0-3.0   Today's INR 3.22!   Full instructions 4/9: 5 mg; 4/10: 5 mg; 4/11: 5 mg; 4/12: 5 mg; 4/13: 5 mg; 4/14: 5 mg; 4/15: 5 mg; Otherwise No maintenance plan   Next INR check 4/16/2018    Indications   Atrial fibrillation (H) [I48.91] [I48.91]  Long-term (current) use of anticoagulants [Z79.01] [Z79.01]         April 2018 Details    Sun Mon Tue Wed Thu Fri Sat     1               2               3               4               5               6               7                 8               9      5 mg   See details      10      5 mg         11      5 mg         12      5 mg         13      5 mg         14      5 mg           15      5 mg         16            17               18               19               20               21                 22               23               24               25               26               27               28                 29               30                     Date Details   04/09 This INR check       Date of next INR:  4/16/2018         How to take your warfarin dose     To take:  5 mg Take 1 of the 5 mg tablets.

## 2018-04-09 NOTE — PROGRESS NOTES
Chief Complaint:  HFrEF/ICM    HPI:   Mr. Tad Manning is a 77-year old male with a past medical history of CLL in remission, CAD s/p CABG in 1995,HFrEF, atrial flutter, PVD s/p iliac artery stents and left lower extremity bypass Hypertension, diabetes, hyperlipidemia, discitis and multple admission for decompensated ICM/HFrEF who presented for follow-up of cardiomyopathy.     He was seen in our clinic for the last time in February 2018 and was hypervolemic. After diuresis, he underwent a LHC that showed moderate disease in his RCA, severe LAD disease and patent grafts and had two drug eluting stents placed in his mid LAD. RHC showed elevated right and left sided pressures with type II pulmonary hypertension.     ROS otherwise negative. Since then he has been feeling somewhat tired, he cant fall sleep feels anxious. He is in AFib his INR is 3.2 today after taking 7mg to increase it. He is taking aspirin and coumadin is not currently on plavix though it is unclear if he is taking it since at the beginning of the visit was confused on whether he should be on it. He felt really good two weeks after the procedure and states that after he started getting tired again is able to walk about 100 yards, no chest pain.     Cardiac meds  -asa  -furosemide 20mg OD  -lisinopril 10mg OD  -coumadin  - 200mg metoprolol XL OD  - spironolactone 12.5mg OD  - atorvastatin 40mg OD      Current Outpatient Prescriptions   Medication Sig Dispense Refill     aspirin 81 MG tablet Take 1 tablet (81 mg) by mouth daily Until INR level reaches 2. 60 tablet 3     furosemide (LASIX) 20 MG tablet Furosemide 40 mg po daily 180 tablet 3     MELATONIN PO Take 5 mg by mouth daily       lisinopril (PRINIVIL/ZESTRIL) 10 MG tablet Take 1 tablet (10 mg) by mouth daily 30 tablet      warfarin (COUMADIN) 5 MG tablet Take 1 tablet (5 mg) by mouth daily Dose adjusted per [facility]. 90 tablet 3     ALPRAZolam (XANAX) 0.25 MG tablet TK 1 T PO  QHS PRN  1     acetaminophen (TYLENOL) 325 MG tablet Take 325 mg by mouth every 4 hours as needed for mild pain       metoprolol (TOPROL-XL) 200 MG 24 hr tablet Take 1 tablet (200 mg) by mouth daily 90 tablet 3     Citalopram Hydrobromide (CELEXA PO) Take 10 mg by mouth daily       spironolactone (ALDACTONE) 25 MG tablet Take 0.5 tablets (12.5 mg) by mouth daily 45 tablet 3     nystatin (MYCOSTATIN) cream Apply topically 2 times daily       atorvastatin (LIPITOR) 40 MG tablet Take 1 tablet (40 mg) by mouth daily 30 tablet 1     metFORMIN (GLUCOPHAGE) 500 MG tablet Take 1 tablet (500 mg) by mouth 2 times daily (with meals) 60 tablet      ranitidine (ZANTAC) 150 MG tablet Take 1 tablet (150 mg) by mouth 2 times daily 60 tablet      cholecalciferol 1000 UNITS TABS Take 1,000 Units by mouth daily 30 tablet      tamsulosin (FLOMAX) 0.4 MG capsule Take 2 capsules (0.8 mg) by mouth At Bedtime 60 capsule      nitroglycerin (NITROSTAT) 0.4 MG sublingual tablet For chest pain place 1 tablet under the tongue every 5 minutes for 3 doses. If symptoms persist 5 minutes after 1st dose call 911. 25 tablet      order for DME Equipment being ordered: DH2 shoe 1 Device 0     clopidogrel (PLAVIX) 75 MG tablet Take 1 tablet (75 mg) by mouth daily 90 tablet 3       Past Medical History:   Diagnosis Date     Arthritis      ASCVD (arteriosclerotic cardiovascular disease)      BMI 30.0-30.9,adult      BPH (benign prostatic hypertrophy)      Cellulitis      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)      Coronary artery disease     triple bypass 1995     Diabetes mellitus (H)      Diabetic polyneuropathy (H)      History of blood transfusion      Hyperlipidaemia      Hypertension      Malignant neoplasm (H)     CLL     Noninflammatory pericardial effusion      Osteomyelitis of left foot (H)      PVD (peripheral vascular disease) (H)      Sebaceous cyst        Past Surgical History:   Procedure Laterality  Date     AMPUTATE TOE(S)  1/10/2014    Procedure: AMPUTATE TOE(S);;  Surgeon: Amador Vick MD;  Location:  OR     BONE MARROW BIOPSY, BONE SPECIMEN, NEEDLE/TROCAR  10/19/2012    Procedure: BIOPSY BONE MARROW;  BONE MARROW BIOPSY  (CONSCIOUS SED);  Surgeon: Ramon Quesada MD;  Location:  GI     BYPASS GRAFT FEMOROPOPLITEAL  1/10/2014    Procedure: BYPASS GRAFT FEMOROPOPLITEAL;  Left above knee popliteal to  Below knee popliteal bypass using transverse saphenous vein graft. Left 2nd Toe amputation;  Surgeon: Amador Vick MD;  Location:  OR     CARDIAC SURGERY      angioplasty with stent, triple bypass     EXCISE CYST GENERIC (LOCATION) N/A 2/1/2016    Procedure: EXCISE CYST GENERIC (LOCATION);  Surgeon: Amador Vick MD;  Location:  SD     GRAFT VEIN FROM EXTREMITY (LOCATION)  1/10/2014    Procedure: GRAFT VEIN FROM EXTREMITY (LOCATION);;  Surgeon: Amador Vick MD;  Location:  OR     LAMINECTOMY THORACIC ONE LEVEL N/A 2/8/2017    Procedure: LAMINECTOMY THORACIC ONE LEVEL;  Surgeon: Marcelo Trent MD;  Location: [facility] OR     OPTICAL TRACKING SYSTEM FUSION POSTERIOR SPINE THORACIC THREE+ LEVELS N/A 2/12/2017    Procedure: OPTICAL TRACKING SYSTEM FUSION POSTERIOR SPINE THORACIC THREE+ LEVELS;  Surgeon: Marcelo Trent MD;  Location: [facility] OR     TONSILLECTOMY       VASCULAR SURGERY      ptcr legs   T9-10 discitis with overlying epidural abscess  S/p laminectomy / abscess evacuation.  Critical lower limb ischemia , s/p L AK pop to BK pop bypass with rGSV on 1/10/2014  Family History   Problem Relation Age of Onset     CANCER Mother      leukemia       Social History   Substance Use Topics     Smoking status: Former Smoker     Packs/day: 2.00     Years: 30.00     Types: Cigarettes     Quit date: 6/19/1995     Smokeless tobacco: Never Used     Alcohol use Yes      Comment: 1 drink monthly       Allergies   Allergen Reactions     Blood Transfusion Related (Informational Only)  Other (See Comments)     Patient has a history of a clinically significant antibody against RBC antigens.  A delay in compatible RBCs may occur.          ROS:   CONSTITUTIONAL:No report of fever, chills,  or change in weight  RESPIRATORY: No cough, wheezing, SOB, or hemoptysis  CARDIOVASCULAR: see HPI  MUSCULO-SKELETAL: No joint pain/swelling, no muslce pain  NEURO: No paresthesias, syncope, pre-syncope, light headness, dizziness or vertigo  ENDOCRINE: No temperature intolerance, no skin/hair changes  PSYCHIATRIC: No change in mood, feeling down/anxious, no change in sleep or appetite  GI: no melena or hematochezia, no change in bowel habits  : no hematuria or dysurea, no hesitancy, dribbling or incontinence  HEME: no easy bruising or bleeding, no history of anemia, no history of blood clots  SKIN: no rashes or sores, no unusual itching    Physical Examination:  Vitals: /76 (BP Location: Left arm, Patient Position: Chair, Cuff Size: Adult Regular)  Pulse 73  Ht 1.829 m (6')  Wt 91.5 kg (201 lb 11.2 oz)  SpO2 95%  BMI 27.36 kg/m2  BMI= Body mass index is 27.36 kg/(m^2).  Weight down to 91.5 Kg from 95K in Feb.     In general, the patient is a pleasant male in no apparent distress.      HEENT: NC/AT.  PERRLA.  EOMI.  Sclerae white, not injected.    Neck: Carotids 2+ bilaterally without bruits.  + jugular venous distension. No thyromegaly.   Heart: irregular rate and rhythm.  No murmur, rub, click, or gallop. There is no heave.    Lungs: Clear bilaterally.  No rhonchi, wheezes, rales.   Abdomen: Soft, nontender, nondistended.   Extremities: 1+ tense edema.  The pulses are 2+at the radial and DP bilaterally.  Neuro: grossly non focal, gait normal.  Skin: no rashes.    Laboratory:    Chemistry panel:   Recent Labs   Lab Test  04/09/18   0951  03/07/18   0453   04/08/17   0927   04/06/17   0537   NA  139  140   < >  144   < >  144   POTASSIUM  4.7  3.7   < >  3.5   < >  3.6   CHLORIDE  107  105   < >  106   <  >  108   CO2  24  25   < >  29   < >  25   ANIONGAP  8  10   < >  9   < >  10   GLC  129*  131*   < >  119*   < >  120*   BUN  30  26   < >  9   < >  10   CR  1.06  0.93   < >  0.63*   < >  0.63*   JUSTINO  9.2  8.6   < >  8.1*   < >  8.0*   MAG   --   1.7   --   1.4*   < >   --    GFRESTIMATED  68  79   < >  >90  Non  GFR Calc     < >  >90  Non  GFR Calc     AST  17   --    --    --    --   10   ALT  33   --    --    --    --   17    < > = values in this interval not displayed.       CBC:   Recent Labs   Lab Test  03/07/18   0453  03/06/18   0717   WBC  4.2  5.1   RBC  4.08*  4.51   HGB  11.4*  12.5*   HCT  36.8*  41.4   MCV  90  92   MCH  27.9  27.7   MCHC  31.0*  30.2*   RDW  17.8*  18.0*   PLT  98*  107*       Lipid Panel:  Recent Labs   Lab Test  04/09/18   0951  05/23/14   0745  01/10/14   0923   CHOL  65  134  92   HDL  30*  34*  28*   LDL  23  48  45   TRIG  58  257*  92   CHOLHDLRATIO   --   3.9  3.3       Thyroid:   No results found for: TSH  No results found for: T4  Hemoglobin A1C   Date Value Ref Range Status   02/10/2017 6.1 (H) 4.3 - 6.0 % Final     INR   Date Value Ref Range Status   04/09/2018 3.22 (H) 0.86 - 1.14 Final         Cardiac data:    ECG today shows atrial fibrillation that is well controlled, nonspecific T-wave abnormalities laterally.    RHC 3/2018    [facility] 3/2018    Echo 2/18  Interpretation Summary  Severe left ventricular dilation is present.  Biplane LV EF 20%.  Severe diffuse hypokinesis is present.  Mild to moderate right ventricular dilation is present.  Global right ventricular function is moderately to severely reduced.  Dilation of the inferior vena cava is present with abnormal respiratory  variation in diameter.  No pericardial effusion is present.  A left pleural effusion is present.    ECG February 2018  Atrial fibrillation nonspecific ST-T abnormalities          EKG 04/06/17:    NSR Rate approx 125 bpm, LAD, no ischemic changes.    Echo  (2/24/2017)  Technically difficult study.The patient's rhythm is atrial fibrillation.  The left ventricle is severely dilated. Left ventricular systolic function is  severely reduced, ejection fraction is estimated at 25-30%. There is severe  global hypokinesis.  Right ventricular systolic function is mildly reduced.  The right ventricular systolic pressure is approximated at 15 mmHg plus the  right atrial pressure. IVC is plethoric and estimated mean RA pressure is 15  mmHg.  No pericardial effusion present.      Assessment and recommendations:  Mr. aTd Manning is a 76-year old male with    1. HFrEF, stage C, NYHA 3  - severe LV dysfunction EF 20%    - lasix increase to 40mgOD until the end of the week  - on lisinopril, spironolactone, metoprolol, lasix    2. CAD, S/p CAB several years ago  - LHC-s/p CABG L-LAD, SVG-OM1, SVG-D1 in 1995  - s/p LHc in 3/2018 and PCI to mLAD  - continue plavix beta blocker and reduce atorvastatin from 40mg OD to 20mg OD  - d/c asa to keep on plavix and coumadin    3. Atrial fibrillation  - started on warfarin during last visit  - in AF rate controlled this visit  -continue 200mg metoprolol XL OD    4. Essential hypertension  - well controlled on current regimen    5. Dyslipidemia  -decrease atorvastatin to 20mg OD    6. DM Type II  -continue metformin    7. PAD with LE stents and bypass   -reinforced need for plavix  -continue statin  - exercise as able    2 weeks [facility] clinic follow up    Seen and discussed with Dr. Lucio Mcrae  Cardiology fellow, PGY-4      ATTENDING ATTESTATION:  This patient has been seen and examined by me April 9, 2018 with Dr. Thomas Mcrae. I have reviewed the vitals, laboratory and imaging data relevant to this patient's care. I have edited this note to reflect our joint assessment and plan, and discussed the plan with the patient.    Mr. Manning returns for follow-up.  He has ischemic cardiomyopathy and chronic systolic heart  failure with an ejection fraction around 20%. He underwent a coronary angiogram in March 2018 and was noted to have patent grafts and his native  LAD had significant obstructive disease after the LIMA anastomosis which was intervened with placement of drug-eluting stents.  He was hospitalized overnight for diuresis given the elevated filling pressures.  Following the PCI he did well for a couple weeks and has started to notice more fatigue and exertional dyspnea now.    On exam he is slightly hypervolemic.  His blood pressure is in normal range.  His atrial fibrillation is well controlled. I have advised the higher dose of Lasix at 40 mg daily with daily weights for this week and return for recheck next Monday when he is here to see Dr. Trent.  His laboratory data reviewed today shows normal renal function.  He would also be a good candidate for digoxin which we will initiate at his next visit.  His lipids are also fairly low so I have reduced atorvastatin to 20 mg once daily.  We will arrange for outpatient cardiac rehab.  He was again advised to monitor closely for bleeding issues since he is on warfarin and Plavix.  Advise follow-up with me in 3 months and in the core clinic in the interim.     Clarisse Valdes MD, MS  Staff Cardiologist  Pager: [phone]

## 2018-04-09 NOTE — PROGRESS NOTES
ANTICOAGULATION FOLLOW-UP CLINIC VISIT    Patient Name:  Tad Manning  Date:  4/9/2018  Contact Type:  Telephone    SUBJECTIVE:     Patient Findings     Positives No Problem Findings           OBJECTIVE    INR   Date Value Ref Range Status   04/09/2018 3.22 (H) 0.86 - 1.14 Final       ASSESSMENT / PLAN  INR assessment SUPRA    Recheck INR In: 1 WEEK    INR Location Clinic      Anticoagulation Summary as of 4/9/2018     INR goal 2.0-3.0   Today's INR 3.22!   Maintenance plan No maintenance plan   Full instructions 4/9: 5 mg; 4/10: 5 mg; 4/11: 5 mg; 4/12: 5 mg; 4/13: 5 mg; 4/14: 5 mg; 4/15: 5 mg; Otherwise No maintenance plan   Next INR check 4/16/2018   Priority INR   Target end date Indefinite    Indications   Atrial fibrillation (H) [I48.91] [I48.91]  Long-term (current) use of anticoagulants [Z79.01] [Z79.01]         Anticoagulation Episode Summary     INR check location     Preferred lab     Send INR reminders to [facility]    Comments HIPPA Infor returned. OK to leave message on cell [phone]. OK to leave messages with Seferino Manning [phone]  labs @ [facility] faxed there 2/16.  fax: [phone]  Goes by [facility]      Anticoagulation Care Providers     Provider Role Specialty Phone number    Clarisse Valdes MD Responsible Cardiology [phone]            See the Encounter Report to view Anticoagulation Flowsheet and Dosing Calendar (Go to Encounters tab in chart review, and find the Anticoagulation Therapy Visit)    Spoke with patient. Gave them their lab results and new warfarin recommendation.  No changes in health, medication, or diet. No missed doses, no falls. No signs or symptoms of bleed or clotting. [facility]ima has an appointment in 1 week for a follow-up after surgery and will have his INR rechecked at that time.     Pinky Thakkar [facility]

## 2018-04-10 LAB — INTERPRETATION ECG - MUSE: NORMAL

## 2018-04-16 ENCOUNTER — ANTICOAGULATION THERAPY VISIT (OUTPATIENT)
Dept: ANTICOAGULATION | Facility: CLINIC | Age: 78
End: 2018-04-16

## 2018-04-16 ENCOUNTER — RADIANT APPOINTMENT (OUTPATIENT)
Dept: GENERAL RADIOLOGY | Facility: CLINIC | Age: 78
End: 2018-04-16
Attending: NEUROLOGICAL SURGERY
Payer: MEDICARE

## 2018-04-16 ENCOUNTER — RADIANT APPOINTMENT (OUTPATIENT)
Dept: CT IMAGING | Facility: CLINIC | Age: 78
End: 2018-04-16
Attending: NEUROLOGICAL SURGERY
Payer: MEDICARE

## 2018-04-16 ENCOUNTER — ALLIED HEALTH/NURSE VISIT (OUTPATIENT)
Dept: CARDIOLOGY | Facility: CLINIC | Age: 78
End: 2018-04-16
Attending: INTERNAL MEDICINE
Payer: MEDICARE

## 2018-04-16 ENCOUNTER — OFFICE VISIT (OUTPATIENT)
Dept: NEUROSURGERY | Facility: CLINIC | Age: 78
End: 2018-04-16
Payer: MEDICARE

## 2018-04-16 VITALS
BODY MASS INDEX: 26.79 KG/M2 | WEIGHT: 197.8 LBS | DIASTOLIC BLOOD PRESSURE: 68 MMHG | SYSTOLIC BLOOD PRESSURE: 109 MMHG | HEIGHT: 72 IN | HEART RATE: 72 BPM | OXYGEN SATURATION: 93 %

## 2018-04-16 VITALS
HEIGHT: 72 IN | BODY MASS INDEX: 27.22 KG/M2 | WEIGHT: 201 LBS | SYSTOLIC BLOOD PRESSURE: 104 MMHG | HEART RATE: 73 BPM | DIASTOLIC BLOOD PRESSURE: 65 MMHG

## 2018-04-16 DIAGNOSIS — G06.2 EPIDURAL ABSCESS: ICD-10-CM

## 2018-04-16 DIAGNOSIS — I48.20 CHRONIC ATRIAL FIBRILLATION (H): ICD-10-CM

## 2018-04-16 DIAGNOSIS — Z79.01 LONG-TERM (CURRENT) USE OF ANTICOAGULANTS: ICD-10-CM

## 2018-04-16 DIAGNOSIS — I50.22 CHRONIC SYSTOLIC HEART FAILURE (H): Primary | ICD-10-CM

## 2018-04-16 LAB — INR PPP: 3.72 (ref 0.86–1.14)

## 2018-04-16 PROCEDURE — G0463 HOSPITAL OUTPT CLINIC VISIT: HCPCS | Mod: ZF

## 2018-04-16 RX ORDER — FUROSEMIDE 20 MG
40 TABLET ORAL DAILY
Qty: 180 TABLET | Refills: 3 | COMMUNITY
Start: 2018-04-16 | End: 2018-05-02

## 2018-04-16 ASSESSMENT — PAIN SCALES - GENERAL: PAINLEVEL: NO PAIN (0)

## 2018-04-16 NOTE — PATIENT INSTRUCTIONS
"Continue your Lasix at 40 mg daily.   Continue Lipitor at 20 mg daily  Continue monitoring daily weights.   Follow up with [facility] in 2 -3 weeks.       Verónica Jarquin RN CHFN  [facility] Nurse Coordinator  Questions and [phone]   First press #1 for the [facility] and then press #3 for \"Medical Advice\" to reach a Cardiology Nurse.    "

## 2018-04-16 NOTE — PROGRESS NOTES
ANTICOAGULATION FOLLOW-UP CLINIC VISIT    Patient Name:  Tad Manning  Date:  4/16/2018  Contact Type:  Telephone    SUBJECTIVE:     Patient Findings     Positives Other complaints (Patient reports nose bleeds occasionally that do not last long.), No Problem Findings    Comments Spoke to [facility].  Recommended 2.5mg of Coumadin one time today and then resume 5mg.  Will check on Friday to determine if we can schedule next draw with his appointment on May 2nd.           OBJECTIVE    INR   Date Value Ref Range Status   04/16/2018 3.72 (H) 0.86 - 1.14 Final       ASSESSMENT / PLAN  INR assessment SUPRA    Recheck INR In: 4 DAYS    INR Location Clinic      Anticoagulation Summary as of 4/16/2018     INR goal 2.0-3.0   Today's INR 3.72!   Maintenance plan No maintenance plan   Full instructions 4/16: 2.5 mg; 4/17: 5 mg; 4/18: 5 mg; 4/19: 5 mg; Otherwise No maintenance plan   Next INR check 4/20/2018   Priority INR   Target end date Indefinite    Indications   Atrial fibrillation (H) [I48.91] [I48.91]  Long-term (current) use of anticoagulants [Z79.01] [Z79.01]         Anticoagulation Episode Summary     INR check location     Preferred lab     Send INR reminders to [facility]    Comments HIPPA Infor returned. OK to leave message on cell [phone]. OK to leave messages with Seferino Manning [phone]  labs @ [facility] faxed there 2/16.  fax: [phone]  Goes by [facility]      Anticoagulation Care Providers     Provider Role Specialty Phone number    Clarisse Valdes MD [facility] Cardiology [phone]            See the Encounter Report to view Anticoagulation Flowsheet and Dosing Calendar (Go to Encounters tab in chart review, and find the Anticoagulation Therapy Visit)    Spoke with patient. Gave them their lab results and new warfarin recommendation.  No changes in health, medication, or diet. No missed doses, no falls. No signs or symptoms of bleed or clotting.    Darien Ballard RN

## 2018-04-16 NOTE — MR AVS SNAPSHOT
Tad Manning   4/16/2018   Anticoagulation Therapy Visit    Description:  77 year old male   Provider:  Darien Ballard   Department:  [facility]           INR as of 4/16/2018     Today's INR 3.72!      Anticoagulation Summary as of 4/16/2018     INR goal 2.0-3.0   Today's INR 3.72!   Full instructions 4/16: 2.5 mg; 4/17: 5 mg; 4/18: 5 mg; 4/19: 5 mg; Otherwise No maintenance plan   Next INR check 4/20/2018    Indications   Atrial fibrillation (H) [I48.91] [I48.91]  Long-term (current) use of anticoagulants [Z79.01] [Z79.01]         April 2018 Details    Sun Mon Tue Wed Thu Fri Sat     1               2               3               4               5               6               7                 8               9               10               11               12               13               14                 15               16      2.5 mg   See details      17      5 mg         18      5 mg         19      5 mg         20            21                 22               23               24               25               26               27               28                 29               30                     Date Details   04/16 This INR check       Date of next INR:  4/20/2018         How to take your warfarin dose     To take:  2.5 mg Take 0.5 of a 5 mg tablet.    To take:  5 mg Take 1 of the 5 mg tablets.

## 2018-04-16 NOTE — MR AVS SNAPSHOT
After Visit Summary   4/16/2018    Tad Manning    MRN: [id number]           Patient Information     Date Of Birth          1940        Visit Information        Provider Department      4/16/2018 11:15 AM Marcelo Trent MD  Health Neurosurgery        Today's Diagnoses     Chronic atrial fibrillation (H)        Long-term (current) use of anticoagulants [Z79.01]           Follow-ups after your visit        Follow-up notes from your care team     Return if symptoms worsen or fail to improve.      Your next 10 appointments already scheduled     May 02, 2018 10:45 AM CDT   Lab with  LAB   [facility] ([facility])    [address] [place] [address]  1st Floor  [facility] [id number] [phone]            May 02, 2018 11:15 AM CDT   (Arrive by 11:00 AM)   CORE RETURN with Rasheeda Stephen PA-C   [facility] ([facility])    [address] [place] [address]  [address] [place] [address] [id number] [phone]            Jul 16, 2018 11:00 AM CDT   (Arrive by 10:45 AM)   Return Visit with Clarisse Valdes MD   [facility] ([facility])    [address] [place] [address]  [address] [place] [id number] [phone]              Who to contact     Please call your clinic at [phone] to:    Ask questions about your health    Make or cancel appointments    Discuss your medicines    Learn about your test results    Speak to your doctor            Additional Information About Your Visit        Ent[employer] Information     [employer] gives you secure access to your electronic health record. If you see a primary care provider, you can also send messages to your care team and make appointments. If you have questions, please call your primary care clinic.  If you do not have a primary care provider, please call [phone] and they will assist you.      [employer] is an electronic gateway that provides easy, online access to your  medical records. With [employer], you can request a clinic appointment, read your test results, renew a prescription or communicate with your care team.     To access your existing account, please contact your [facility] or call [phone] for assistance.        Care EveryWhere ID     This is your Care EveryWhere ID. This could be used by other organizations to access your [place] medical records  [id number]        Your Vitals Were     Pulse Height BMI (Body Mass Index)             73 1.829 m (6') 27.26 kg/m2          Blood Pressure from Last 3 Encounters:   04/16/18 109/68   04/16/18 104/65   04/09/18 114/76    Weight from Last 3 Encounters:   04/16/18 89.7 kg (197 lb 12.8 oz)   04/16/18 91.2 kg (201 lb)   04/09/18 91.5 kg (201 lb 11.2 oz)                 Today's Medication Changes          These changes are accurate as of 4/16/18 11:59 PM.  If you have any questions, ask your nurse or doctor.               These medicines have changed or have updated prescriptions.        Dose/Directions    furosemide 20 MG tablet   Commonly known as:  LASIX   This may have changed:    - how much to take  - how to take this  - when to take this  - additional instructions   Used for:  Chronic systolic heart failure (H)   Changed by:  Nurse,  Cv        Dose:  40 mg   Take 2 tablets (40 mg) by mouth daily   Quantity:  180 tablet   Refills:  3                Primary Care Provider Office Phone # Fax #    Gene Guevara [phone]       [place] [phone]        Equal Access to Services     STUART ELLISON : Hadii liana linton Sogin, waaxda luqadaha, qaybta kaalmadeandre holley . So [facility] [phone].    ATENCIÓN: Si habla español, tiene a noe disposición servicios gratuitos de asistencia lingüística. Llame al [phone].    We comply with applicable federal civil rights laws and [place] laws.  We do not discriminate on the basis of race, color, national origin, age, disability, sex, sexual orientation, or gender identity.            Thank you!     Thank you for choosing [facility]  for your care. Our goal is always to provide you with excellent care. Hearing back from our patients is one way we can continue to improve our services. Please take a few minutes to complete the written survey that you may receive in the mail after your visit with us. Thank you!             Your Updated Medication List - Protect others around you: Learn how to safely use, store and throw away your medicines at www.disposemymeds.org.          This list is accurate as of 4/16/18 11:59 PM.  Always use your most recent med list.                   Brand Name Dispense Instructions for use Diagnosis    acetaminophen 325 MG tablet    TYLENOL     Take 325 mg by mouth every 4 hours as needed for mild pain        ALPRAZolam 0.25 MG tablet    XANAX     TK 1 T PO QHS PRN    Epidural abscess       aspirin 81 MG tablet     60 tablet    Take 1 tablet (81 mg) by mouth daily Until INR level reaches 2.    Status post percutaneous transluminal coronary angioplasty, Coronary artery disease involving native coronary artery of native heart without angina pectoris       atorvastatin 20 MG tablet    LIPITOR    90 tablet    TAKE 1 TABLET BY MOUTH EVERY DAY    Claudication of left lower extremity (H)       CELEXA PO      Take 10 mg by mouth daily        cholecalciferol 1000 units Tabs     30 tablet    Take 1,000 Units by mouth daily    Loop diuretic causing adverse effect in therapeutic use, subsequent encounter       clopidogrel 75 MG tablet    PLAVIX    90 tablet    Take 1 tablet (75 mg) by mouth daily    Status post percutaneous transluminal coronary angioplasty, Coronary artery disease involving native coronary artery of native heart without angina pectoris       furosemide 20 MG tablet    LASIX    180 tablet    Take 2 tablets (40 mg) by  mouth daily    Chronic systolic heart failure (H)       lisinopril 10 MG tablet    PRINIVIL/ZESTRIL    30 tablet    Take 1 tablet (10 mg) by mouth daily        MELATONIN PO      Take 5 mg by mouth daily        metFORMIN 500 MG tablet    GLUCOPHAGE    60 tablet    Take 1 tablet (500 mg) by mouth 2 times daily (with meals)    Type 2 diabetes mellitus with diabetic polyneuropathy, without long-term current use of insulin (H)       metoprolol succinate 200 MG 24 hr tablet    TOPROL-XL    90 tablet    Take 1 tablet (200 mg) by mouth daily    Chronic systolic heart failure (H)       nitroGLYcerin 0.4 MG sublingual tablet    NITROSTAT    25 tablet    For chest pain place 1 tablet under the tongue every 5 minutes for 3 doses. If symptoms persist 5 minutes after 1st dose call 911.    Atrial flutter, unspecified type (H)       nystatin cream    MYCOSTATIN     Apply topically 2 times daily        order for DME     1 Device    Equipment being ordered: DH2 shoe    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), Diabetic ulcer of left foot due to type 2 diabetes mellitus (H)       ranitidine 150 MG tablet    ZANTAC    60 tablet    Take 1 tablet (150 mg) by mouth 2 times daily    Gastroesophageal reflux disease without esophagitis       sodium chloride 0.65 % nasal spray    EQ SALINE NASAL SPRAY    1 Bottle    Spray 1 spray into both nostrils daily as needed for congestion        spironolactone 25 MG tablet    ALDACTONE    45 tablet    Take 0.5 tablets (12.5 mg) by mouth daily    Chronic systolic congestive heart failure (H)       tamsulosin 0.4 MG capsule    FLOMAX    60 capsule    Take 2 capsules (0.8 mg) by mouth At Bedtime    Renal insufficiency       warfarin 5 MG tablet    COUMADIN    90 tablet    Take 1 tablet (5 mg) by mouth daily Dose adjusted per [facility].    Paroxysmal atrial fibrillation (H)

## 2018-04-16 NOTE — LETTER
4/16/2018       RE: Tad Manning  [address] [address] [address]     Dear Colleague,    Thank you for referring your patient, Tad Manning, to the [facility]UROS[facility]ERY at [facility]. Please see a copy of my visit note below.    It was a pleasure to see Tad Manning today in Neurosurgery Clinic. He is a 77 year old male who underwent:     DATE OF OPERATION:  02/09/2017       PREOPERATIVE DIAGNOSIS:  Epidural abscess, osteomyelitis.       POSTOPERATIVE DIAGNOSIS:  Epidural abscess, osteomyelitis.       PROCEDURES PERFORMED:   1.  T9-T10 laminectomy.   2.  T9 transpedicular left-sided transpedicular disk space abscess evacuation.       SURGEON:  Marcelo Trent MD.       :  Ileana Floyd MD       PREOPERATIVE DIAGNOSIS:    1.  Thoracic spine instability.   2.  Osteomyelitis and discitis T9-T10 s/p laminectomy.       POSTOPERATIVE DIAGNOSIS:    1.  Thoracic spine instability.   2.  Osteomyelitis and discitis T9-T10 s/p laminectomy.           PROCEDURES PERFORMED:   1.  T7-T12 posterior segmental instrumentation.   2.  Transpedicular, transforaminal right-sided T9-T10 interbody fusion.   3.  Posterior arthrodesis T7-T12.  4.  Use of intraoperative neuro-navigation.    5.  Use of intraoperative O-arm.   6.  Use of intraoperative cell saver.     7.  Use of allograft bone.       ATTENDING SURGEON:  Marcelo Trent MD       ASSISTANT:  Kunal Ramos MD     Doing well. Some back stiffness and numbness on R flank.    Vitals:    04/16/18 1041   BP: 104/65   BP Location: Right arm   Patient Position: Chair   Cuff Size: Adult Regular   Pulse: 73   Weight: 91.2 kg (201 lb)   Height: 1.829 m (6')     Body mass index is 27.26 kg/(m^2).  No Pain (0)    Awake. Alert. Incision well healed.  Strength 5/5 BLE.    Imaging: CT shows stable alignment of hardware. Solid fusion across T9-10. The imaging was shown to the patient and reviewed in  clinic.     Assessment: S/P thoracic fusion for osteomyelitis/discitis.    Plan: RTC PRN.     Again, thank you for allowing me to participate in the care of your patient.      Sincerely,    Marcelo Trent MD

## 2018-04-16 NOTE — NURSING NOTE
Tad came in for nurse visit to assess weight since increasing Lasix last week in clinic. Reviewed weights and blood pressure with Dr Valdes. Denies SOB and states he has been feeling well. Down 4 lbs since last week.   Per Dr Valdes:  Date: 4/16/2018    Time of Call: 11:55 AM     Diagnosis:  Systolic heart failure     [ VORB ] Ordering provider: Dr Valdes  Order: continue Lasix at 40 mg daily. Follow up with [facility] in 2-3 weeks.      Order received by: Verónica Jarquin RN      Follow-up/additional notes: patient verbalizes understanding and agrees with plan of care. Verónica Jarquin, RN

## 2018-04-16 NOTE — MR AVS SNAPSHOT
"              After Visit Summary   2018    Tad Manning    MRN: [id number]           Patient Information     Date Of Birth          1940        Visit Information        Provider Department      2018 12:00 PM Nurse,  [facility]        Today's Diagnoses     Chronic systolic heart failure (H)    -  1      Care Instructions    Continue your Lasix at 40 mg daily.   Continue Lipitor at 20 mg daily  Continue monitoring daily weights.   Follow up with [facility] in 2 -3 weeks.       Verónica Jarquin RN CHFN  [facility] Nurse Coordinator  Questions and [phone]   First press #1 for the University and then press #3 for \"Medical Advice\" to reach a Cardiology Nurse.            Follow-ups after your visit        Additional Services     Follow-Up with [facility]                 Your next 10 appointments already scheduled     May 02, 2018 10:45 AM CDT   Lab with [facility]    [facility] ([facility])    [address] [place] [address]  1st Floor  [facility] [address]   [phone]            May 02, 2018 11:15 AM CDT   (Arrive by 11:00 AM)   CORE RETURN with Rasheeda Stephen PA-C   [facility] ([facility])    [address] [place] [address]  [address] [place] [id number] [phone]            2018 11:00 AM CDT   (Arrive by 10:45 AM)   Return Visit with Clarisse Valdes MD   [facility] ([facility])    [address] [place] [address]  [address] [place] [id number] [phone]              Future tests that were ordered for you today     Open Future Orders        Priority Expected Expires Ordered    Follow-Up with [facility] Routine 2018    INR Routine 2018            Who to contact     If you have questions or need follow up information about today's clinic visit or your schedule please contact [facility] directly at " [phone].  Normal or non-critical lab and imaging results will be communicated to you by [employer]RAhart, letter or phone within 4 business days after the clinic has received the results. If you do not hear from us within 7 days, please contact the clinic through [employer] or phone. If you have a critical or abnormal lab result, we will notify you by phone as soon as possible.  Submit refill requests through [employer] or call your pharmacy and they will forward the refill request to us. Please allow 3 business days for your refill to be completed.          Additional Information About Your Visit        Z[employer]BRAhart Information     [employer] gives you secure access to your electronic health record. If you see a primary care provider, you can also send messages to your care team and make appointments. If you have questions, please call your primary care clinic.  If you do not have a primary care provider, please call [phone] and they will assist you.        Care EveryWhere ID     This is your Care EveryWhere ID. This could be used by other organizations to access your [facility] medical records  [id number]        Your Vitals Were     Pulse Height Pulse Oximetry BMI (Body Mass Index)          72 1.829 m (6') 93% 26.83 kg/m2         Blood Pressure from Last 3 Encounters:   04/16/18 109/68   04/16/18 104/65   04/09/18 114/76    Weight from Last 3 Encounters:   04/16/18 89.7 kg (197 lb 12.8 oz)   04/16/18 91.2 kg (201 lb)   04/09/18 91.5 kg (201 lb 11.2 oz)                 Today's Medication Changes          These changes are accurate as of 4/16/18 12:00 PM.  If you have any questions, ask your nurse or doctor.               These medicines have changed or have updated prescriptions.        Dose/Directions    furosemide 20 MG tablet   Commonly known as:  LASIX   This may have changed:    - how much to take  - how to take this  - when to take this  - additional instructions   Used for:  Chronic systolic heart failure (H)   Changed  by:  Nurse, [facility]        Dose:  40 mg   Take 2 tablets (40 mg) by mouth daily   Quantity:  180 tablet   Refills:  3                Primary Care Provider Office Phone # Fax #    Gene Guevara [phone]       [address] [address]        Equal Access to Services     PARKERKULWINDER TERRA : Hadii aad ku hadasho Soomaali, waaxda luqadaha, qaybta kaalmada adeegyada, deandre carter haywinsomen teodoro mejialukeryn howard ah. So [facility] [phone].    ATENCIÓN: Si habla español, tiene a noe disposición servicios gratuitos de asistencia lingüística. [facility] [phone].    We comply with applicable federal civil rights laws and [place] laws. We do not discriminate on the basis of race, color, national origin, age, disability, sex, sexual orientation, or gender identity.            Thank you!     Thank you for choosing [facility]  for your care. Our goal is always to provide you with excellent care. Hearing back from our patients is one way we can continue to improve our services. Please take a few minutes to complete the written survey that you may receive in the mail after your visit with us. Thank you!             Your Updated Medication List - Protect others around you: Learn how to safely use, store and throw away your medicines at www.disposemymeds.org.          This list is accurate as of 4/16/18 12:00 PM.  Always use your most recent med list.                   Brand Name Dispense Instructions for use Diagnosis    acetaminophen 325 MG tablet    TYLENOL     Take 325 mg by mouth every 4 hours as needed for mild pain        ALPRAZolam 0.25 MG tablet    XANAX     TK 1 T PO QHS PRN    Epidural abscess       aspirin 81 MG tablet     60 tablet    Take 1 tablet (81 mg) by mouth daily Until INR level reaches 2.    Status post percutaneous transluminal coronary angioplasty, Coronary artery disease involving native coronary artery of native heart without angina pectoris        atorvastatin 20 MG tablet    LIPITOR    90 tablet    TAKE 1 TABLET BY MOUTH EVERY DAY    Claudication of left lower extremity (H)       CELEXA PO      Take 10 mg by mouth daily        cholecalciferol 1000 units Tabs     30 tablet    Take 1,000 Units by mouth daily    Loop diuretic causing adverse effect in therapeutic use, subsequent encounter       clopidogrel 75 MG tablet    PLAVIX    90 tablet    Take 1 tablet (75 mg) by mouth daily    Status post percutaneous transluminal coronary angioplasty, Coronary artery disease involving native coronary artery of native heart without angina pectoris       furosemide 20 MG tablet    LASIX    180 tablet    Take 2 tablets (40 mg) by mouth daily    Chronic systolic heart failure (H)       lisinopril 10 MG tablet    PRINIVIL/ZESTRIL    30 tablet    Take 1 tablet (10 mg) by mouth daily        MELATONIN PO      Take 5 mg by mouth daily        metFORMIN 500 MG tablet    GLUCOPHAGE    60 tablet    Take 1 tablet (500 mg) by mouth 2 times daily (with meals)    Type 2 diabetes mellitus with diabetic polyneuropathy, without long-term current use of insulin (H)       metoprolol succinate 200 MG 24 hr tablet    TOPROL-XL    90 tablet    Take 1 tablet (200 mg) by mouth daily    Chronic systolic heart failure (H)       nitroGLYcerin 0.4 MG sublingual tablet    NITROSTAT    25 tablet    For chest pain place 1 tablet under the tongue every 5 minutes for 3 doses. If symptoms persist 5 minutes after 1st dose call 911.    Atrial flutter, unspecified type (H)       nystatin cream    MYCOSTATIN     Apply topically 2 times daily        order for DME     1 Device    Equipment being ordered: [facility]    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), Diabetic ulcer of left foot due to type 2 diabetes mellitus (H)       ranitidine 150 MG tablet    ZANTAC    60 tablet    Take 1 tablet (150 mg) by mouth 2 times daily    Gastroesophageal reflux disease without esophagitis       sodium chloride  0.65 % nasal spray    EQ SALINE NASAL SPRAY    1 Bottle    Spray 1 spray into both nostrils daily as needed for congestion        spironolactone 25 MG tablet    ALDACTONE    45 tablet    Take 0.5 tablets (12.5 mg) by mouth daily    Chronic systolic congestive heart failure (H)       tamsulosin 0.4 MG capsule    FLOMAX    60 capsule    Take 2 capsules (0.8 mg) by mouth At Bedtime    Renal insufficiency       warfarin 5 MG tablet    COUMADIN    90 tablet    Take 1 tablet (5 mg) by mouth daily Dose adjusted per [facility]madin Clinic.    Paroxysmal atrial fibrillation (H)

## 2018-04-20 ENCOUNTER — ANTICOAGULATION THERAPY VISIT (OUTPATIENT)
Dept: ANTICOAGULATION | Facility: CLINIC | Age: 78
End: 2018-04-20

## 2018-04-20 DIAGNOSIS — Z79.01 LONG-TERM (CURRENT) USE OF ANTICOAGULANTS: ICD-10-CM

## 2018-04-20 DIAGNOSIS — I48.20 CHRONIC ATRIAL FIBRILLATION (H): ICD-10-CM

## 2018-04-20 LAB — INR BLD: 3.4 (ref 0.86–1.14)

## 2018-04-20 PROCEDURE — 36416 COLLJ CAPILLARY BLOOD SPEC: CPT | Performed by: INTERNAL MEDICINE

## 2018-04-20 PROCEDURE — 85610 PROTHROMBIN TIME: CPT | Mod: QW | Performed by: INTERNAL MEDICINE

## 2018-04-20 NOTE — PROGRESS NOTES
ANTICOAGULATION FOLLOW-UP CLINIC VISIT    Patient Name:  Tad Manning  Date:  4/20/2018  Contact Type:  Telephone    SUBJECTIVE:     Patient Findings     Positives Unexplained INR or factor level change           OBJECTIVE    INR Point of Care   Date Value Ref Range Status   04/20/2018 3.4 (H) 0.86 - 1.14 Final     Comment:     This test is intended for monitoring Coumadin therapy.  Results are not   accurate in patients with prolonged INR due to factor deficiency.         ASSESSMENT / PLAN  No question data found.  Anticoagulation Summary as of 4/20/2018     INR goal 2.0-3.0   Today's INR 3.4!   Maintenance plan No maintenance plan   Full instructions 4/20: 2.5 mg; 4/21: 5 mg; 4/22: 5 mg; 4/23: 5 mg; Otherwise No maintenance plan   Next INR check 4/24/2018   Priority INR   Target end date Indefinite    Indications   Atrial fibrillation (H) [I48.91] [I48.91]  Long-term (current) use of anticoagulants [Z79.01] [Z79.01]         Anticoagulation Episode Summary     INR check location     Preferred lab     Send INR reminders to [facility]    Comments HIPPA Infor returned. OK to leave message on cell [phone]. OK to leave messages with Seferino Manning [phone]  labs @ [facility] faxed there 2/16.  fax: [phone]  Goes by [employer]      Anticoagulation Care Providers     Provider Role Specialty Phone number    Clarisse Valdes MD Responsible Cardiology [phone]            See the Encounter Report to view Anticoagulation Flowsheet and Dosing Calendar (Go to Encounters tab in chart review, and find the Anticoagulation Therapy Visit)    Spoke with Raina Hearn RN

## 2018-04-20 NOTE — MR AVS SNAPSHOT
Tad Manning   4/20/2018   Anticoagulation Therapy Visit    Description:  77 year old male   Provider:  Vicki Hearn, RN   Department:  [facility]           INR as of 4/20/2018     Today's INR 3.4!      Anticoagulation Summary as of 4/20/2018     INR goal 2.0-3.0   Today's INR 3.4!   Full instructions 4/20: 2.5 mg; 4/21: 5 mg; 4/22: 5 mg; 4/23: 5 mg; Otherwise No maintenance plan   Next INR check 4/24/2018    Indications   Atrial fibrillation (H) [I48.91] [I48.91]  Long-term (current) use of anticoagulants [Z79.01] [Z79.01]         April 2018 Details    Sun Mon Tue Wed Thu Fri Sat     1               2               3               4               5               6               7                 8               9               10               11               12               13               14                 15               16               17               18               19               20      2.5 mg   See details      21      5 mg           22      5 mg         23      5 mg         24            25               26               27               28                 29               30                     Date Details   04/20 This INR check       Date of next INR:  4/24/2018         How to take your warfarin dose     To take:  2.5 mg Take 0.5 of a 5 mg tablet.    To take:  5 mg Take 1 of the 5 mg tablets.

## 2018-04-24 ENCOUNTER — ANTICOAGULATION THERAPY VISIT (OUTPATIENT)
Dept: ANTICOAGULATION | Facility: CLINIC | Age: 78
End: 2018-04-24

## 2018-04-24 DIAGNOSIS — I48.20 CHRONIC ATRIAL FIBRILLATION (H): ICD-10-CM

## 2018-04-24 DIAGNOSIS — Z79.01 LONG-TERM (CURRENT) USE OF ANTICOAGULANTS: ICD-10-CM

## 2018-04-24 LAB — INR BLD: 3.8 (ref 0.86–1.14)

## 2018-04-24 PROCEDURE — 85610 PROTHROMBIN TIME: CPT | Mod: QW | Performed by: INTERNAL MEDICINE

## 2018-04-24 PROCEDURE — 36416 COLLJ CAPILLARY BLOOD SPEC: CPT | Performed by: INTERNAL MEDICINE

## 2018-04-24 NOTE — MR AVS SNAPSHOT
Tad Tim Manning   4/24/2018   Anticoagulation Therapy Visit    Description:  77 year old male   Provider:  Chelsea Bland RN   Department:  [facility]           INR as of 4/24/2018     Today's INR 3.8!      Anticoagulation Summary as of 4/24/2018     INR goal 2.0-3.0   Today's INR 3.8!   Full instructions 4/24: 2.5 mg; 4/25: 5 mg; 4/26: 2.5 mg; 4/27: 5 mg; 4/28: 2.5 mg; 4/29: 5 mg; 4/30: 5 mg; Otherwise No maintenance plan   Next INR check 5/1/2018    Indications   Atrial fibrillation (H) [I48.91] [I48.91]  Long-term (current) use of anticoagulants [Z79.01] [Z79.01]         April 2018 Details    Sun Mon Tue Wed u Fri Sat     1               2               3               4               5               6               7                 8               9               10               11               12               13               14                 15               16               17               18               19               20               21                 22               23               24      2.5 mg   See details      25      5 mg         26      2.5 mg         27      5 mg         28      2.5 mg           29      5 mg         30      5 mg               Date Details   04/24 This INR check               How to take your warfarin dose     To take:  2.5 mg Take 0.5 of a 5 mg tablet.    To take:  5 mg Take 1 of the 5 mg tablets.           May 2018 Details    Sun Mon Tue Wed u Fri Sat       1            2               3               4               5                 6               7               8               9               10               11               12                 13               14               15               16               17               18               19                 20               21               22               23               24               25               26                 27               28               29               30                31                  Date Details   No additional details    Date of next INR:  5/1/2018

## 2018-04-24 NOTE — PROGRESS NOTES
ANTICOAGULATION FOLLOW-UP CLINIC VISIT    Patient Name:  Tad Manning  Date:  4/24/2018  Contact Type:  Telephone    SUBJECTIVE:     Patient Findings     Positives Change in diet/appetite (Pt will eat a salad today.)           OBJECTIVE    INR Point of Care   Date Value Ref Range Status   04/24/2018 3.8 (H) 0.86 - 1.14 Final     Comment:     This test is intended for monitoring Coumadin therapy.  Results are not   accurate in patients with prolonged INR due to factor deficiency.         ASSESSMENT / PLAN  INR assessment SUPRA    Recheck INR In: 1 WEEK    INR Location Clinic      Anticoagulation Summary as of 4/24/2018     INR goal 2.0-3.0   Today's INR 3.8!   Maintenance plan No maintenance plan   Full instructions 4/24: 2.5 mg; 4/25: 5 mg; 4/26: 2.5 mg; 4/27: 5 mg; 4/28: 2.5 mg; 4/29: 5 mg; 4/30: 5 mg; Otherwise No maintenance plan   Next INR check 5/1/2018   Priority INR   Target end date Indefinite    Indications   Atrial fibrillation (H) [I48.91] [I48.91]  Long-term (current) use of anticoagulants [Z79.01] [Z79.01]         Anticoagulation Episode Summary     INR check location     Preferred lab     Send INR reminders to [facility]    Comments HIPPA Infor returned. OK to leave message on cell [phone]. OK to leave messages with Seferino Manning [phone]  labs @ [facility] faxed there 2/16.  fax: [phone]  Goes by [facility]      Anticoagulation Care Providers     Provider Role Specialty Phone number    Clarisse Valdes MD [facility] Cardiology [phone]            See the Encounter Report to view Anticoagulation Flowsheet and Dosing Calendar (Go to Encounters tab in chart review, and find the Anticoagulation Therapy Visit)  Spoke with patient.    Chelsea Bland RN

## 2018-04-26 ENCOUNTER — TELEPHONE (OUTPATIENT)
Dept: CARDIOLOGY | Facility: CLINIC | Age: 78
End: 2018-04-26

## 2018-04-26 ENCOUNTER — PRE VISIT (OUTPATIENT)
Dept: CARDIOLOGY | Facility: CLINIC | Age: 78
End: 2018-04-26

## 2018-04-26 DIAGNOSIS — I50.22 CHRONIC SYSTOLIC HEART FAILURE (H): Primary | ICD-10-CM

## 2018-04-26 NOTE — TELEPHONE ENCOUNTER
Patient reporting that for the last week or so his left knee has become very swollen and has a faint purplish color to it.  Patient does not recall having an injury to his knee.  Patient did state that he did not wear his compression stockings for a few days before the onset of this.    Also, his right knee feels a little sore also.   Please call to discuss.  [phone].

## 2018-05-01 ENCOUNTER — ANTICOAGULATION THERAPY VISIT (OUTPATIENT)
Dept: ANTICOAGULATION | Facility: CLINIC | Age: 78
End: 2018-05-01

## 2018-05-01 DIAGNOSIS — Z79.01 LONG-TERM (CURRENT) USE OF ANTICOAGULANTS: ICD-10-CM

## 2018-05-01 DIAGNOSIS — I48.20 CHRONIC ATRIAL FIBRILLATION (H): ICD-10-CM

## 2018-05-01 LAB — INR BLD: 2.6 (ref 0.86–1.14)

## 2018-05-01 PROCEDURE — 85610 PROTHROMBIN TIME: CPT | Mod: QW | Performed by: INTERNAL MEDICINE

## 2018-05-01 PROCEDURE — 36416 COLLJ CAPILLARY BLOOD SPEC: CPT | Performed by: INTERNAL MEDICINE

## 2018-05-01 NOTE — PROGRESS NOTES
ANTICOAGULATION FOLLOW-UP CLINIC VISIT    Patient Name:  Tad Manning  Date:  5/1/2018  Contact Type:  Telephone    SUBJECTIVE:     Patient Findings     Positives Nose bleeds (4/29-Nosebleed that lasted 4 hours.)           OBJECTIVE    INR Point of Care   Date Value Ref Range Status   05/01/2018 2.6 (H) 0.86 - 1.14 Final     Comment:     This test is intended for monitoring Coumadin therapy.  Results are not   accurate in patients with prolonged INR due to factor deficiency.         ASSESSMENT / PLAN  INR assessment THER    Recheck INR In: 1 WEEK    INR Location Clinic      Anticoagulation Summary as of 5/1/2018     INR goal 2.0-3.0   Today's INR 2.6   Maintenance plan No maintenance plan   Full instructions 5/1: 2.5 mg; 5/2: 5 mg; 5/3: 2.5 mg; 5/4: 5 mg; 5/5: 2.5 mg; 5/6: 5 mg; 5/7: 2.5 mg; Otherwise No maintenance plan   Next INR check 5/8/2018   Priority INR   Target end date Indefinite    Indications   Atrial fibrillation (H) [I48.91] [I48.91]  Long-term (current) use of anticoagulants [Z79.01] [Z79.01]         Anticoagulation Episode Summary     INR check location     Preferred lab     Send INR reminders to [place] [facility]    Comments HIPPA Infor returned. OK to leave message on cell [phone]. OK to leave messages with Seferino Manning [phone]  labs @ [facility] faxed there 2/16.  fax: [phone]  Goes by [facility]      Anticoagulation Care Providers     Provider Role Specialty Phone number    Clarisse Valdes MD [facility] Cardiology [phone]            See the Encounter Report to view Anticoagulation Flowsheet and Dosing Calendar (Go to Encounters tab in chart review, and find the Anticoagulation Therapy Visit)    Spoke with patient.    Chelsea Bland RN

## 2018-05-01 NOTE — MR AVS SNAPSHOT
Tad Tim Manning   5/1/2018   Anticoagulation Therapy Visit    Description:  77 year old male   Provider:  Chelsea Bland, RN   Department:  [facility]           INR as of 5/1/2018     Today's INR 2.6      Anticoagulation Summary as of 5/1/2018     INR goal 2.0-3.0   Today's INR 2.6   Full instructions 5/1: 2.5 mg; 5/2: 5 mg; 5/3: 2.5 mg; 5/4: 5 mg; 5/5: 2.5 mg; 5/6: 5 mg; 5/7: 2.5 mg; Otherwise No maintenance plan   Next INR check 5/8/2018    Indications   Atrial fibrillation (H) [I48.91] [I48.91]  Long-term (current) use of anticoagulants [Z79.01] [Z79.01]         May 2018 Details    Sun Mon Tue Wed Thu Fri Sat       1      2.5 mg   See details      2      5 mg         3      2.5 mg         4      5 mg         5      2.5 mg           6      5 mg         7      2.5 mg         8            9               10               11               12                 13               14               15               16               17               18               19                 20               21               22               23               24               25               26                 27               28               29               30               31                  Date Details   05/01 This INR check       Date of next INR:  5/8/2018         How to take your warfarin dose     To take:  2.5 mg Take 0.5 of a 5 mg tablet.    To take:  5 mg Take 1 of the 5 mg tablets.

## 2018-05-02 ENCOUNTER — OFFICE VISIT (OUTPATIENT)
Dept: CARDIOLOGY | Facility: CLINIC | Age: 78
End: 2018-05-02
Attending: PHYSICIAN ASSISTANT
Payer: MEDICARE

## 2018-05-02 VITALS
BODY MASS INDEX: 27.05 KG/M2 | OXYGEN SATURATION: 96 % | HEART RATE: 88 BPM | DIASTOLIC BLOOD PRESSURE: 70 MMHG | HEIGHT: 72 IN | SYSTOLIC BLOOD PRESSURE: 108 MMHG | WEIGHT: 199.7 LBS

## 2018-05-02 DIAGNOSIS — I50.22 CHRONIC SYSTOLIC CONGESTIVE HEART FAILURE (H): ICD-10-CM

## 2018-05-02 DIAGNOSIS — I50.22 CHRONIC SYSTOLIC HEART FAILURE (H): ICD-10-CM

## 2018-05-02 DIAGNOSIS — M25.561 ACUTE PAIN OF RIGHT KNEE: Primary | ICD-10-CM

## 2018-05-02 DIAGNOSIS — I25.10 CAD S/P PERCUTANEOUS CORONARY ANGIOPLASTY: ICD-10-CM

## 2018-05-02 DIAGNOSIS — Z98.61 CAD S/P PERCUTANEOUS CORONARY ANGIOPLASTY: ICD-10-CM

## 2018-05-02 LAB
ANION GAP SERPL CALCULATED.3IONS-SCNC: 10 MMOL/L (ref 3–14)
BUN SERPL-MCNC: 31 MG/DL (ref 7–30)
CALCIUM SERPL-MCNC: 9.5 MG/DL (ref 8.5–10.1)
CHLORIDE SERPL-SCNC: 103 MMOL/L (ref 94–109)
CO2 SERPL-SCNC: 25 MMOL/L (ref 20–32)
CREAT SERPL-MCNC: 1.11 MG/DL (ref 0.66–1.25)
GFR SERPL CREATININE-BSD FRML MDRD: 64 ML/MIN/1.7M2
GLUCOSE SERPL-MCNC: 121 MG/DL (ref 70–99)
POTASSIUM SERPL-SCNC: 4.3 MMOL/L (ref 3.4–5.3)
SODIUM SERPL-SCNC: 137 MMOL/L (ref 133–144)
URATE SERPL-MCNC: 8.8 MG/DL (ref 3.5–7.2)

## 2018-05-02 PROCEDURE — G0463 HOSPITAL OUTPT CLINIC VISIT: HCPCS | Mod: ZF

## 2018-05-02 PROCEDURE — 84550 ASSAY OF BLOOD/URIC ACID: CPT | Performed by: PHYSICIAN ASSISTANT

## 2018-05-02 PROCEDURE — 99214 OFFICE O/P EST MOD 30 MIN: CPT | Mod: ZP | Performed by: PHYSICIAN ASSISTANT

## 2018-05-02 PROCEDURE — 36415 COLL VENOUS BLD VENIPUNCTURE: CPT | Performed by: PHYSICIAN ASSISTANT

## 2018-05-02 PROCEDURE — 80048 BASIC METABOLIC PNL TOTAL CA: CPT | Performed by: PHYSICIAN ASSISTANT

## 2018-05-02 RX ORDER — FUROSEMIDE 20 MG
40 TABLET ORAL 2 TIMES DAILY
Qty: 180 TABLET | Refills: 3
Start: 2018-05-02 | End: 2018-05-18

## 2018-05-02 RX ORDER — SPIRONOLACTONE 25 MG/1
25 TABLET ORAL DAILY
Qty: 45 TABLET | Refills: 3
Start: 2018-05-02 | End: 2018-05-22

## 2018-05-02 ASSESSMENT — PAIN SCALES - GENERAL: PAINLEVEL: NO PAIN (0)

## 2018-05-02 NOTE — PROGRESS NOTES
CARDIOLOGY CLINIC  May 2, 2018    HPI:   Mr. Manning is a 77 year old male with a history including CLL in remission, coronary artery disease s/p 3 vessel CABG in 1995 (LIMA-LAD, SVG-OM1, SVG-D1), ischemic cardiomyopathy with EF 20%, atrial flutter, PAD and DM who presents to [facility] for 3 week follow up.     He is doing fairly well, his main complaint is right knee pain. He notes that 2 weeks ago he started having left knee pain and swelling. It lasted for about a week and then moved to his right knee. No prior history of gout or arthritis. It hurts most when putting weight on it. Over the past week he feels the pain is similar but swelling mildly worse.     He saw Dr. Valdes about 3 weeks ago, at which time he was hypervolemic. His lasix was increased to 40mg daily. He saw our nurse in visit 1 week after that and his weight was down 4 pounds. He was continued on lasix 40mg daily. His goal for his weight is less than 200 pounds. His weight at home today was 197.4lbs, up a little bit though from the week prior. He does not have dyspnea at rest and it's difficult to quantify his exertional ability as he is limited right now from his knee pain. He denies any chest pressure or pain, lightheadedenss or dizziness and palpitation. No PND or orthopnea currently though sleeps mostly in a recliner. Also notes not sleeping well.     He lives in an apartment with a roommate, he can do cooking but is limited to his cleaning ability due to dyspnea.  He is here today with his brother.     PAST MEDICAL HISTORY:  Coronary artery disease s/p bypass as above  Ischemic cardiomyopathy  BPH  CLL in remission  DM  Hypertension  PAD- left fem to popliteal bypass, also s/p iliac stents  Spinal osteomyelitis with diskitis and epidural abscess s/p decompression and fusion 2/2017    FAMILY HISTORY:  Family History   Problem Relation Age of Onset     CANCER Mother      leukemia       SOCIAL HISTORY: not , lives with roommate,  former smoker- 60 pack year history- quit in 1995    CURRENT MEDICATIONS:  Outpatient Medications Prior to Visit   Medication Sig Dispense Refill     acetaminophen (TYLENOL) 325 MG tablet Take 325 mg by mouth every 4 hours as needed for mild pain       ALPRAZolam (XANAX) 0.25 MG tablet TK 1 T PO QHS PRN  1     aspirin 81 MG tablet Take 1 tablet (81 mg) by mouth daily Until INR level reaches 2. 60 tablet 3     atorvastatin (LIPITOR) 20 MG tablet TAKE 1 TABLET BY MOUTH EVERY DAY 90 tablet 3     cholecalciferol 1000 UNITS TABS Take 1,000 Units by mouth daily 30 tablet      Citalopram Hydrobromide (CELEXA PO) Take 10 mg by mouth daily       clopidogrel (PLAVIX) 75 MG tablet Take 1 tablet (75 mg) by mouth daily 90 tablet 3     furosemide (LASIX) 20 MG tablet Take 2 tablets (40 mg) by mouth daily 180 tablet 3     lisinopril (PRINIVIL/ZESTRIL) 10 MG tablet Take 1 tablet (10 mg) by mouth daily 30 tablet      MELATONIN PO Take 5 mg by mouth daily       metFORMIN (GLUCOPHAGE) 500 MG tablet Take 1 tablet (500 mg) by mouth 2 times daily (with meals) 60 tablet      metoprolol (TOPROL-XL) 200 MG 24 hr tablet Take 1 tablet (200 mg) by mouth daily 90 tablet 3     nitroglycerin (NITROSTAT) 0.4 MG sublingual tablet For chest pain place 1 tablet under the tongue every 5 minutes for 3 doses. If symptoms persist 5 minutes after 1st dose call 911. 25 tablet      nystatin (MYCOSTATIN) cream Apply topically 2 times daily       order for DME Equipment being ordered: 2 shoe 1 Device 0     ranitidine (ZANTAC) 150 MG tablet Take 1 tablet (150 mg) by mouth 2 times daily 60 tablet      sodium chloride (EQ SALINE NASAL SPRAY) 0.65 % nasal spray Spray 1 spray into both nostrils daily as needed for congestion 1 Bottle 1     spironolactone (ALDACTONE) 25 MG tablet Take 0.5 tablets (12.5 mg) by mouth daily 45 tablet 3     tamsulosin (FLOMAX) 0.4 MG capsule Take 2 capsules (0.8 mg) by mouth At Bedtime 60 capsule      warfarin (COUMADIN) 5 MG tablet  Take 1 tablet (5 mg) by mouth daily Dose adjusted per Coumadin Clinic. 90 tablet 3     ROS:  Constitutional: no fever or diaphoresis.    ENT: +blood nose a few days ago for several hours  Respiratory:  No cough or hemoptysis   Cardiovascular: As per HPI.   GI: no nausea, vomiting, diarrhea or hematochezia   : no dysuria, or hematuria.   Integument: +easy bruising, no rashes  Psychiatric: +insomnia  Neuro: Negative for headaches or syncope. No h/o stroke  Endocrinology: +DM, +cold intolerance  Musculoskeletal: Negative for gout, knee pain as above    EXAM:  /70 (BP Location: Left arm, Patient Position: Chair, Cuff Size: Adult Regular)  Pulse 88  Ht 1.829 m (6')  Wt 90.6 kg (199 lb 11.2 oz)  SpO2 96%  BMI 27.08 kg/m2  General: seated in chair, in NAD, walker with him  HEENT: NC/AT, sclera anicteric, MMM  Neck: neck supple. JVP is 12cm, reflexes to 14cm in seated position  Heart: regular rhythm, normal S1/S2, no murmur appreciated    Lungs:CTA B, no wheezing or rales noted  Abdomen: mild distention, +BS, soft, NT  Extremities: 1+ edema to thighs, wearing compression stockings, warm, no redness  Neurological: alert and oriented.  normal speech, no gross motor deficits noted  Skin:  Ecchymosis noted on hands    Labs:  CMP RESULTS:  Lab Results   Component Value Date     04/09/2018    POTASSIUM 4.7 04/09/2018    CHLORIDE 107 04/09/2018    CO2 24 04/09/2018    ANIONGAP 8 04/09/2018     (H) 04/09/2018    BUN 30 04/09/2018    CR 1.06 04/09/2018    GFRESTIMATED 68 04/09/2018    GFRESTBLACK 82 04/09/2018    JUSTINO 9.2 04/09/2018    BILITOTAL 1.4 (H) 04/09/2018    ALBUMIN 3.4 04/09/2018    ALKPHOS 134 04/09/2018    ALT 33 04/09/2018    AST 17 04/09/2018        CARDIAC STUDIES:  Echocardiogram 2/5/2018: severe LV dilation, EF 20%, diffuse hypokinesis, mild to moderate RV dilation with moderately reduced function, no effusion, minimal valvular disease    Assessment and Plan:   Mr. Manning is a pleasant 77  year old male with a past medical history including chronic systolic heart failure who presents to [facility] clinic today for follow up.    1. Chronic systolic heart failure secondary to ischemic cardiomyopathy.  Stage C NYHA Class III  Fluid status hypervolemic, increase Lasix to 40mg BID.   ACEi/ARB yes, on lisinopril 10mg daily, consider increase in future  BB yes, on Toprol XL 200mg daily   Aldosterone antagonist yes, on spironolactone 12.5mg daily- increase  SCD prophylaxis- s/p revascularization almost 3 months ago, will obtain updated echocardiogram within next 1-2 months. Optimize medical therapy first. This was discussed with him.     2. Coronary artery disease- s/p 2 stents to LAD after LIMA touchdown in 2/2018. He is on Plavix, he is supposed to be off aspirin if his INR is >2. Seems the patient sometimes takes 325mg for pain and sometimes takes 81mg daily.   -Remain off aspirin while on dual therapy with Plavix and warfarin  -Continue BB and statin. LDL at goal- 23 in 4/9/18    3. Knee pain- Consider gout, will add on Uric acid. Low likelihood of septic joint or hemarthrosis, though encourage orthopedic follow up especially if uric acid not significantly elevated. Verified ok to take tylenol for pain, avoid aleve or ibuprofen.     4. Atrial flutter- occurred post surgery in the hospital, maintains on chronic warfarin.     Follow-up: [facility] Tuesday next week, [facility] clinic with me in 2 weeks with NICK prior  Dr. Valdes in July as already arranged.      Rasheeda Stephen PA-C  [facility]  Pager [phone]

## 2018-05-02 NOTE — MR AVS SNAPSHOT
After Visit Summary   5/2/2018    Peter Manning    MRN: [id number]           Patient Information     Date Of Birth          1940        Visit Information        Provider Department      5/2/2018 11:15 AM Rasheeda Stephen PA-C [facility]        Today's Diagnoses     Acute pain of right knee    -  1    Chronic systolic heart failure (H)        Chronic systolic congestive heart failure (H)          Care Instructions    You were seen today in the Cardiovascular Clinic at the [facility].     Cardiology Providers you saw during your visit: DICK Mckay     Follow up and medication changes:  1. Increase Lasix to 40 mg two times daily  2. Increase Spironolactone to 25 mg daily  3. Repeat Basic Metabolic Panel lab on Tuesday when you get your INR  4. Folllow up with Eun Stephen on Friday May 18th at 11:00 with labs at 10:30.   5. Take Tylenol for knee pain. Avoid NSAIDS like ibuprofen and Aleve. Avoid Aspirin as well.       Results for PETER MANNING (MRN [id number]) as of 5/2/2018 11:19   Ref. Range 5/2/2018 10:48   Sodium Latest Ref Range: 133 - 144 mmol/L 137   Potassium Latest Ref Range: 3.4 - 5.3 mmol/L 4.3   Chloride Latest Ref Range: 94 - 109 mmol/L 103   Carbon Dioxide Latest Ref Range: 20 - 32 mmol/L 25   Urea Nitrogen Latest Ref Range: 7 - 30 mg/dL 31 (H)   Creatinine Latest Ref Range: 0.66 - 1.25 mg/dL 1.11   GFR Estimate Latest Ref Range: >60 mL/min/1.7m2 64   GFR Estimate If Black Latest Ref Range: >60 mL/min/1.7m2 78   Calcium Latest Ref Range: 8.5 - 10.1 mg/dL 9.5   Anion Gap Latest Ref Range: 3 - 14 mmol/L 10   Glucose Latest Ref Range: 70 - 99 mg/dL 121 (H)       Please limit your fluid intake to 2 L (68 ounces) daily.  2 Liters a day = 8.5 cups, or 68 ounces.  Please limit your salt intake to 2 grams a day or less.     If you gain 2# in 24 hours or 5# in one week call Verónica Jarquin RN so we can adjust your medications as  "needed over the phone.     Please feel free to call me with any questions or concerns.       Verónica Jarquin RN  [facility]  Cardiology Care Coordinator-Heart Failure Clinic     Questions and [phone].   First press #1 for the [employer] and then press #3 for \"Medical Questions\" to reach us Cardiology Nurses.      On Call Cardiologist for after hours or on weekends: [phone]   option #4 and ask to speak to the on-call Cardiologist. Inform them you are a CORE/heart failure patient at the [facility].           If you need a medication refill please contact your pharmacy.  Please allow 3 business days for your refill to be completed.  _______________________________________________________  C.O.R.E. CLINIC Cardiomyopathy, Optimization, Rehabilitation, Education   The C.O.R.E. CLINIC is a heart failure specialty clinic within the [facility] where you will work with specialized nurse practitioners dedicated to helping patients with heart failure carefully adjust medications, receive education, and learn who and when to call if symptoms develop. They specialize in helping you better understand your condition, slow the progression of your disease, improve the length and quality of your life, help you detect future heart problems before they become life threatening, and avoid hospitalizations.  As always, thank you for trusting us with your health care needs!       Cardiac Rehab [facility]  [address] [place] [address], [address]   [place] [address] Opens in new window   Phone: [phone]            Follow-ups after your visit        Additional Services     Follow-Up with CORE Clinic       Eun 11am with labs prior                  Your next 10 appointments already scheduled     May 18, 2018 10:30 AM CDT   Lab with  LAB    Health Lab ([facility] [place])    [address] [address] "   [phone]            May 18, 2018 11:00 AM CDT   (Arrive by 10:45 AM)   CORE RETURN with Rasheeda Stephen PA-C   [facility] ([facility])    [address]  [address]  [facility] [address] [phone]            Jul 16, 2018 11:00 AM CDT   (Arrive by 10:45 AM)   Return Visit with Clarisse Valdes MD   [facility] ([facility])    [address]  [address]  [facility] [address] [phone]              Future tests that were ordered for you today     Open Future Orders        Priority Expected Expires Ordered    Basic metabolic panel Routine 5/8/2018 8/7/2018 5/2/2018    Follow-Up with CORE Clinic Routine 5/18/2018 8/7/2018 5/2/2018            Who to contact     If you have questions or need follow up information about today's clinic visit or your schedule please contact [facility] directly at [phone].  Normal or non-critical lab and imaging results will be communicated to you by [employer], letter or phone within 4 business days after the clinic has received the results. If you do not hear from us within 7 days, please contact the clinic through [employer] or phone. If you have a critical or abnormal lab result, we will notify you by phone as soon as possible.  Submit refill requests through [employer] or call your pharmacy and they will forward the refill request to us. Please allow 3 business days for your refill to be completed.          Additional Information About Your Visit        [employer] Information     [employer] gives you secure access to your electronic health record. If you see a primary care provider, you can also send messages to your care team and make appointments. If you have questions, please call your primary care clinic.  If you do not have a primary care provider, please call [phone] and they will assist you.        Care EveryWhere ID     This is your Care EveryWhere ID. This could be used  by other organizations to access your [facility] medical records  [id number]        Your Vitals Were     Pulse Height Pulse Oximetry BMI (Body Mass Index)          88 1.829 m (6') 96% 27.08 kg/m2         Blood Pressure from Last 3 Encounters:   05/02/18 108/70   04/16/18 109/68   04/16/18 104/65    Weight from Last 3 Encounters:   05/02/18 90.6 kg (199 lb 11.2 oz)   04/16/18 89.7 kg (197 lb 12.8 oz)   04/16/18 91.2 kg (201 lb)              We Performed the Following     Follow-Up with [facility]          Today's Medication Changes          These changes are accurate as of 5/2/18 12:27 PM.  If you have any questions, ask your nurse or doctor.               These medicines have changed or have updated prescriptions.        Dose/Directions    furosemide 20 MG tablet   Commonly known as:  LASIX   This may have changed:  when to take this   Used for:  Chronic systolic heart failure (H)   Changed by:  Rasheeda Stephen PA-C        Dose:  40 mg   Take 2 tablets (40 mg) by mouth 2 times daily   Quantity:  180 tablet   Refills:  3       spironolactone 25 MG tablet   Commonly known as:  ALDACTONE   This may have changed:  how much to take   Used for:  Chronic systolic congestive heart failure (H)   Changed by:  Rasheeda Stephen PA-C        Dose:  25 mg   Take 1 tablet (25 mg) by mouth daily   Quantity:  45 tablet   Refills:  3         Stop taking these medicines if you haven't already. Please contact your care team if you have questions.     aspirin 81 MG tablet   Stopped by:  Rasheeda Stephen PA-C                Where to get your medicines      Some of these will need a paper prescription and others can be bought over the counter.  Ask your nurse if you have questions.     You don't need a prescription for these medications     furosemide 20 MG tablet    spironolactone 25 MG tablet                Primary Care Provider Office Phone # Fax #    Gene Guevara [phone]       [facility]  CLINIC [address] [place] [address]        Equal Access to Services     PARKERKULWINDER TERRA : Hadii aad ku hadevanghada Marilingin, waameenada jammie, qafelixta stacylarryjaki fonseca, deandre mejialukeyrn del cid. So [facility] [phone].    ATENCIÓN: Si habla español, tiene a noe disposición servicios gratuitos de asistencia lingüística. [facility] [phone].    We comply with applicable federal civil rights laws and [place] laws. We do not discriminate on the basis of race, color, national origin, age, disability, sex, sexual orientation, or gender identity.            Thank you!     Thank you for choosing [facility]  for your care. Our goal is always to provide you with excellent care. Hearing back from our patients is one way we can continue to improve our services. Please take a few minutes to complete the written survey that you may receive in the mail after your visit with us. Thank you!             Your Updated Medication List - Protect others around you: Learn how to safely use, store and throw away your medicines at www.disposemymeds.org.          This list is accurate as of 5/2/18 12:27 PM.  Always use your most recent med list.                   Brand Name Dispense Instructions for use Diagnosis    acetaminophen 325 MG tablet    TYLENOL     Take 325 mg by mouth every 4 hours as needed for mild pain        ALPRAZolam 0.25 MG tablet    XANAX     TK 1 T PO QHS PRN    Epidural abscess       atorvastatin 20 MG tablet    LIPITOR    90 tablet    TAKE 1 TABLET BY MOUTH EVERY DAY    Claudication of left lower extremity (H)       CELEXA PO      Take 10 mg by mouth daily        cholecalciferol 1000 units Tabs     30 tablet    Take 1,000 Units by mouth daily    Loop diuretic causing adverse effect in therapeutic use, subsequent encounter       clopidogrel 75 MG tablet    PLAVIX    90 tablet    Take 1 tablet (75 mg) by mouth daily    Status post percutaneous transluminal coronary angioplasty, Coronary  artery disease involving native coronary artery of native heart without angina pectoris       furosemide 20 MG tablet    LASIX    180 tablet    Take 2 tablets (40 mg) by mouth 2 times daily    Chronic systolic heart failure (H)       lisinopril 10 MG tablet    PRINIVIL/ZESTRIL    30 tablet    Take 1 tablet (10 mg) by mouth daily        MELATONIN PO      Take 5 mg by mouth daily        metFORMIN 500 MG tablet    GLUCOPHAGE    60 tablet    Take 1 tablet (500 mg) by mouth 2 times daily (with meals)    Type 2 diabetes mellitus with diabetic polyneuropathy, without long-term current use of insulin (H)       metoprolol succinate 200 MG 24 hr tablet    TOPROL-XL    90 tablet    Take 1 tablet (200 mg) by mouth daily    Chronic systolic heart failure (H)       nitroGLYcerin 0.4 MG sublingual tablet    NITROSTAT    25 tablet    For chest pain place 1 tablet under the tongue every 5 minutes for 3 doses. If symptoms persist 5 minutes after 1st dose call 911.    Atrial flutter, unspecified type (H)       nystatin cream    MYCOSTATIN     Apply topically 2 times daily        order for DME     1 Device    Equipment being ordered: [facility] shoe    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), Diabetic ulcer of left foot due to type 2 diabetes mellitus (H)       ranitidine 150 MG tablet    ZANTAC    60 tablet    Take 1 tablet (150 mg) by mouth 2 times daily    Gastroesophageal reflux disease without esophagitis       sodium chloride 0.65 % nasal spray    EQ SALINE NASAL SPRAY    1 Bottle    Spray 1 spray into both nostrils daily as needed for congestion        spironolactone 25 MG tablet    ALDACTONE    45 tablet    Take 1 tablet (25 mg) by mouth daily    Chronic systolic congestive heart failure (H)       tamsulosin 0.4 MG capsule    FLOMAX    60 capsule    Take 2 capsules (0.8 mg) by mouth At Bedtime    Renal insufficiency       warfarin 5 MG tablet    COUMADIN    90 tablet    Take 1 tablet (5 mg) by mouth daily Dose adjusted  per [facility] Clinic.    Paroxysmal atrial fibrillation (H)

## 2018-05-02 NOTE — NURSING NOTE
Diet: Patient instructed regarding a heart healthy diet, including discussion of reduced fat and sodium intake. Patient demonstrated understanding of this information and agreed to call with further questions or concerns.  Labs: Patient was given results of the laboratory testing obtained today. Patient was instructed to return for the next laboratory testing on Tuesday 5/8 . Patient demonstrated understanding of this information and agreed to call with further questions or concerns.   Return Appointment: Patient given instructions regarding scheduling next clinic visit. Patient demonstrated understanding of this information and agreed to call with further questions or concerns.  Medication Change: Patient was educated regarding prescribed medication change, including discussion of the indication, administration, side effects, and when to report to MD or RN. Patient demonstrated understanding of this information and agreed to call with further questions or concerns.  Patient stated he understood all health information given and agreed to call with further questions or concerns.   Verónica Jarquin RN

## 2018-05-02 NOTE — NURSING NOTE
Chief Complaint   Patient presents with     Follow Up For     Return CORE; 77yr old male wih a h/o CAD, Aflutter and chronic systolic HF secondary to ICM presenting for HF follow-up with labs prior     Vitals were taken and medications were reconciled.   Mary Carmen Logan MA    11:11 AM

## 2018-05-02 NOTE — PATIENT INSTRUCTIONS
"You were seen today in the Cardiovascular Clinic at the [facility].     Cardiology Providers you saw during your visit: DICK Mckay     Follow up and medication changes:  1. Increase Lasix to 40 mg two times daily  2. Increase Spironolactone to 25 mg daily  3. Repeat Basic Metabolic Panel lab on Tuesday when you get your INR  4. Folllow up with Eun Stephen on Friday May 18th at 11:00 with labs at 10:30.   5. Take Tylenol for knee pain. Avoid NSAIDS like ibuprofen and Aleve. Avoid Aspirin as well.       Results for PETER BUTLER (MRN [id number]) as of 2018 11:19   Ref. Range 2018 10:48   Sodium Latest Ref Range: 133 - 144 mmol/L 137   Potassium Latest Ref Range: 3.4 - 5.3 mmol/L 4.3   Chloride Latest Ref Range: 94 - 109 mmol/L 103   Carbon Dioxide Latest Ref Range: 20 - 32 mmol/L 25   Urea Nitrogen Latest Ref Range: 7 - 30 mg/dL 31 (H)   Creatinine Latest Ref Range: 0.66 - 1.25 mg/dL 1.11   GFR Estimate Latest Ref Range: >60 mL/min/1.7m2 64   GFR Estimate If Black Latest Ref Range: >60 mL/min/1.7m2 78   Calcium Latest Ref Range: 8.5 - 10.1 mg/dL 9.5   Anion Gap Latest Ref Range: 3 - 14 mmol/L 10   Glucose Latest Ref Range: 70 - 99 mg/dL 121 (H)       Please limit your fluid intake to 2 L (68 ounces) daily.  2 Liters a day = 8.5 cups, or 68 ounces.  Please limit your salt intake to 2 grams a day or less.     If you gain 2# in 24 hours or 5# in one week call Verónica Jarquin RN so we can adjust your medications as needed over the phone.     Please feel free to call me with any questions or concerns.       Verónica Jarquin RN  [facility]  Cardiology Care Coordinator-Heart Failure Clinic     Questions and [phone].   First press #1 for the [employer] and then press #3 for \"Medical Questions\" to reach us Cardiology Nurses.      On Call Cardiologist for after hours or on weekends: [phone]   option #4 and ask to speak to the on-call " Cardiologist. Inform them you are a CORE/heart failure patient at the [place].           If you need a medication refill please contact your pharmacy.  Please allow 3 business days for your refill to be completed.  _______________________________________________________  C.O.R.E. CLINIC Cardiomyopathy, Optimization, Rehabilitation, Education   The C.O.R.E. CLINIC is a heart failure specialty clinic within the [facility] where you will work with specialized nurse practitioners dedicated to helping patients with heart failure carefully adjust medications, receive education, and learn who and when to call if symptoms develop. They specialize in helping you better understand your condition, slow the progression of your disease, improve the length and quality of your life, help you detect future heart problems before they become life threatening, and avoid hospitalizations.  As always, thank you for trusting us with your health care needs!       Cardiac Rehab [address], [address]   [place] [address] Opens in new window   Phone: [phone]

## 2018-05-02 NOTE — LETTER
5/2/2018      RE: Tad Manning  [address] [address] [address]       Dear Colleague,    Thank you for the opportunity to participate in the care of your patient, Tad Manning, at the [facility]. Please see a copy of my visit note below.    CARDIOLOGY CLINIC  May 2, 2018    HPI:   Mr. Manning is a 77 year old male with a history including CLL in remission, coronary artery disease s/p 3 vessel CABG in 1995 (LIMA-LAD, SVG-OM1, SVG-D1), ischemic cardiomyopathy with EF 20%, atrial flutter, PAD and DM who presents to [facility] for 3 week follow up.     He is doing fairly well, his main complaint is right knee pain. He notes that 2 weeks ago he started having left knee pain and swelling. It lasted for about a week and then moved to his right knee. No prior history of gout or arthritis. It hurts most when putting weight on it. Over the past week he feels the pain is similar but swelling mildly worse.     He saw Dr. Valdes about 3 weeks ago, at which time he was hypervolemic. His lasix was increased to 40mg daily. He saw our nurse in visit 1 week after that and his weight was down 4 pounds. He was continued on lasix 40mg daily. His goal for his weight is less than 200 pounds. His weight at home today was 197.4lbs, up a little bit though from the week prior. He does not have dyspnea at rest and it's difficult to quantify his exertional ability as he is limited right now from his knee pain. He denies any chest pressure or pain, lightheadedenss or dizziness and palpitation. No PND or orthopnea currently though sleeps mostly in a recliner. Also notes not sleeping well.     He lives in an apartment with a roommate, he can do cooking but is limited to his cleaning ability due to dyspnea.  He is here today with his brother.     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 60 tablet 3     atorvastatin (LIPITOR) 20 MG tablet TAKE 1 TABLET BY MOUTH EVERY DAY 90 tablet 3     cholecalciferol 1000 UNITS TABS Take 1,000 Units by mouth daily 30 tablet      Citalopram Hydrobromide (CELEXA PO) Take 10 mg by mouth daily       clopidogrel (PLAVIX) 75 MG tablet Take 1 tablet (75 mg) by mouth daily 90 tablet 3     furosemide (LASIX) 20 MG tablet Take 2 tablets (40 mg) by mouth daily 180 tablet 3     lisinopril (PRINIVIL/ZESTRIL) 10 MG tablet Take 1 tablet (10 mg) by mouth daily 30 tablet      MELATONIN PO Take 5 mg by mouth daily       metFORMIN (GLUCOPHAGE) 500 MG tablet Take 1 tablet (500 mg) by mouth 2 times daily (with meals) 60 tablet      metoprolol (TOPROL-XL) 200 MG 24 hr tablet Take 1 tablet (200 mg) by mouth daily 90 tablet 3     nitroglycerin (NITROSTAT) 0.4 MG sublingual tablet For chest pain place 1 tablet under the tongue every 5 minutes for 3 doses. If symptoms persist 5 minutes after 1st dose call 911. 25 tablet      nystatin (MYCOSTATIN) cream Apply topically 2 times daily       order for DME Equipment being ordered: DH2 shoe 1 Device 0     ranitidine (ZANTAC) 150 MG tablet Take 1 tablet (150 mg) by mouth 2 times daily 60 tablet      sodium chloride (EQ SALINE  NASAL SPRAY) 0.65 % nasal spray Spray 1 spray into both nostrils daily as needed for congestion 1 Bottle 1     spironolactone (ALDACTONE) 25 MG tablet Take 0.5 tablets (12.5 mg) by mouth daily 45 tablet 3     tamsulosin (FLOMAX) 0.4 MG capsule Take 2 capsules (0.8 mg) by mouth At Bedtime 60 capsule      warfarin (COUMADIN) 5 MG tablet Take 1 tablet (5 mg) by mouth daily Dose adjusted per [facility] Clinic. 90 tablet 3     ROS:  Constitutional: no fever or diaphoresis.    ENT: +blood nose a few days ago for several hours  Respiratory:  No cough or hemoptysis   Cardiovascular: As per HPI.   GI: no nausea, vomiting, diarrhea or hematochezia   : no dysuria, or hematuria.   Integument: +easy bruising, no rashes  Psychiatric: +insomnia  Neuro: Negative for headaches or syncope. No h/o stroke  Endocrinology: +DM, +cold intolerance  Musculoskeletal: Negative for gout, knee pain as above    EXAM:  /70 (BP Location: Left arm, Patient Position: Chair, Cuff Size: Adult Regular)  Pulse 88  Ht 1.829 m (6')  Wt 90.6 kg (199 lb 11.2 oz)  SpO2 96%  BMI 27.08 kg/m2  General: seated in chair, in NAD, walker with him  HEENT: NC/AT, sclera anicteric, MMM  Neck: neck supple. JVP is 12cm, reflexes to 14cm in seated position  Heart: regular rhythm, normal S1/S2, no murmur appreciated    Lungs:CTA B, no wheezing or rales noted  Abdomen: mild distention, +BS, soft, NT  Extremities: 1+ edema to thighs, wearing compression stockings, warm, no redness  Neurological: alert and oriented.  normal speech, no gross motor deficits noted  Skin:  Ecchymosis noted on hands    Labs:  CMP RESULTS:  Lab Results   Component Value Date     04/09/2018    POTASSIUM 4.7 04/09/2018    CHLORIDE 107 04/09/2018    CO2 24 04/09/2018    ANIONGAP 8 04/09/2018     (H) 04/09/2018    BUN 30 04/09/2018    CR 1.06 04/09/2018    GFRESTIMATED 68 04/09/2018    GFRESTBLACK 82 04/09/2018    JUSTINO 9.2 04/09/2018    BILITOTAL 1.4 (H) 04/09/2018    ALBUMIN  3.4 04/09/2018    ALKPHOS 134 04/09/2018    ALT 33 04/09/2018    AST 17 04/09/2018        CARDIAC STUDIES:  Echocardiogram 2/5/2018: severe LV dilation, EF 20%, diffuse hypokinesis, mild to moderate RV dilation with moderately reduced function, no effusion, minimal valvular disease    Assessment and Plan:   Mr. Manning is a pleasant 77 year old male with a past medical history including chronic systolic heart failure who presents to [facility] clinic today for follow up.    1. Chronic systolic heart failure secondary to ischemic cardiomyopathy.  Stage C NYHA Class III  Fluid status hypervolemic, increase Lasix to 40mg BID.   ACEi/ARB yes, on lisinopril 10mg daily, consider increase in future  BB yes, on Toprol XL 200mg daily   Aldosterone antagonist yes, on spironolactone 12.5mg daily- increase  SCD prophylaxis- s/p revascularization almost 3 months ago, will obtain updated echocardiogram within next 1-2 months. Optimize medical therapy first. This was discussed with him.     2. Coronary artery disease- s/p 2 stents to LAD after LIMA touchdown in 2/2018. He is on Plavix, he is supposed to be off aspirin if his INR is >2. Seems the patient sometimes takes 325mg for pain and sometimes takes 81mg daily.   -Remain off aspirin while on dual therapy with Plavix and warfarin  -Continue BB and statin. LDL at goal- 23 in 4/9/18    3. Knee pain- Consider gout, will add on Uric acid. Low likelihood of septic joint or hemarthrosis, though encourage orthopedic follow up especially if uric acid not significantly elevated. Verified ok to take tylenol for pain, avoid aleve or ibuprofen.     4. Atrial flutter- occurred post surgery in the hospital, maintains on chronic warfarin.     Follow-up: [facility] Tuesday next week, [facility] clinic with me in 2 weeks with BMP prior  Dr. Valdes in July as already arranged.      Rasheeda Stephen PA-C  [facility]  Pager [phone]

## 2018-05-08 ENCOUNTER — ANTICOAGULATION THERAPY VISIT (OUTPATIENT)
Dept: ANTICOAGULATION | Facility: CLINIC | Age: 78
End: 2018-05-08

## 2018-05-08 DIAGNOSIS — I48.20 CHRONIC ATRIAL FIBRILLATION (H): ICD-10-CM

## 2018-05-08 DIAGNOSIS — Z79.01 LONG-TERM (CURRENT) USE OF ANTICOAGULANTS: ICD-10-CM

## 2018-05-08 DIAGNOSIS — I50.22 CHRONIC SYSTOLIC HEART FAILURE (H): ICD-10-CM

## 2018-05-08 LAB
ANION GAP SERPL CALCULATED.3IONS-SCNC: 6 MMOL/L (ref 3–14)
BUN SERPL-MCNC: 32 MG/DL (ref 7–30)
CALCIUM SERPL-MCNC: 9.3 MG/DL (ref 8.5–10.1)
CHLORIDE SERPL-SCNC: 103 MMOL/L (ref 94–109)
CO2 SERPL-SCNC: 29 MMOL/L (ref 20–32)
CREAT SERPL-MCNC: 1.17 MG/DL (ref 0.66–1.25)
GFR SERPL CREATININE-BSD FRML MDRD: 61 ML/MIN/1.7M2
GLUCOSE SERPL-MCNC: 155 MG/DL (ref 70–99)
INR PPP: 1.78 (ref 0.86–1.14)
POTASSIUM SERPL-SCNC: 5.1 MMOL/L (ref 3.4–5.3)
SODIUM SERPL-SCNC: 138 MMOL/L (ref 133–144)

## 2018-05-08 PROCEDURE — 80048 BASIC METABOLIC PNL TOTAL CA: CPT | Performed by: INTERNAL MEDICINE

## 2018-05-08 PROCEDURE — 36415 COLL VENOUS BLD VENIPUNCTURE: CPT | Performed by: INTERNAL MEDICINE

## 2018-05-08 PROCEDURE — 85610 PROTHROMBIN TIME: CPT | Performed by: INTERNAL MEDICINE

## 2018-05-08 NOTE — MR AVS SNAPSHOT
Tad Manning   5/8/2018   Anticoagulation Therapy Visit    Description:  77 year old male   Provider:  Darien Ballard   Department:  [facility]           INR as of 5/8/2018     Today's INR 1.78!      Anticoagulation Summary as of 5/8/2018     INR goal 2.0-3.0   Today's INR 1.78!   Full instructions 5/8: 5 mg; 5/9: 5 mg; 5/10: 2.5 mg; 5/11: 5 mg; 5/12: 2.5 mg; 5/13: 5 mg; 5/14: 5 mg; Otherwise No maintenance plan   Next INR check 5/15/2018    Indications   Atrial fibrillation (H) [I48.91] [I48.91]  Long-term (current) use of anticoagulants [Z79.01] [Z79.01]         May 2018 Details    Sun Mon Tue Wed Thu Fri Sat       1               2               3               4               5                 6               7               8      5 mg   See details      9      5 mg         10      2.5 mg         11      5 mg         12      2.5 mg           13      5 mg         14      5 mg         15            16               17               18               19                 20               21               22               23               24               25               26                 27               28               29               30               31                  Date Details   05/08 This INR check       Date of next INR:  5/15/2018         How to take your warfarin dose     To take:  2.5 mg Take 0.5 of a 5 mg tablet.    To take:  5 mg Take 1 of the 5 mg tablets.

## 2018-05-08 NOTE — PROGRESS NOTES
ANTICOAGULATION FOLLOW-UP CLINIC VISIT    Patient Name:  Tad Manning  Date:  5/8/2018  Contact Type:  Telephone    SUBJECTIVE:     Patient Findings     Positives Unexplained INR or factor level change (Subtherapeutic INR result is 1.78 today.)    Comments Spoke to [facility].  He took half a dose yesterday.  Reported diet consisted of extra greens over the weekend.  Recommended a one time increase today to 5mg.  Will recheck in one week.           OBJECTIVE    INR   Date Value Ref Range Status   05/08/2018 1.78 (H) 0.86 - 1.14 Final       ASSESSMENT / PLAN  INR assessment SUB    Recheck INR In: 1 WEEK    INR Location Clinic      Anticoagulation Summary as of 5/8/2018     INR goal 2.0-3.0   Today's INR 1.78!   Maintenance plan No maintenance plan   Full instructions 5/8: 5 mg; 5/9: 5 mg; 5/10: 2.5 mg; 5/11: 5 mg; 5/12: 2.5 mg; 5/13: 5 mg; 5/14: 5 mg; Otherwise No maintenance plan   Next INR check 5/15/2018   Priority INR   Target end date Indefinite    Indications   Atrial fibrillation (H) [I48.91] [I48.91]  Long-term (current) use of anticoagulants [Z79.01] [Z79.01]         Anticoagulation Episode Summary     INR check location     Preferred lab     Send INR reminders to [facility]    Comments [employer]IP[employer] Infor returned. OK to leave message on cell [phone]. OK to leave messages with Seferino Manning [phone]  labs @ [facility] faxed there 2/16.  fax: [phone]  Goes by [facility]      Anticoagulation Care Providers     Provider Role Specialty Phone number    Clarisse Valdes MD Responsible Cardiology [phone]            See the Encounter Report to view Anticoagulation Flowsheet and Dosing Calendar (Go to Encounters tab in chart review, and find the Anticoagulation Therapy Visit)    Spoke with patient. Gave them their lab results and new warfarin recommendation.  No changes in health, or medication. No missed doses, no falls. No signs or symptoms of bleed or clotting.  Adjusted to diet  changes.    Darien Ballard, RN

## 2018-05-14 ENCOUNTER — PRE VISIT (OUTPATIENT)
Dept: CARDIOLOGY | Facility: CLINIC | Age: 78
End: 2018-05-14

## 2018-05-14 DIAGNOSIS — I50.22 CHRONIC SYSTOLIC HEART FAILURE (H): Primary | ICD-10-CM

## 2018-05-15 ENCOUNTER — ANTICOAGULATION THERAPY VISIT (OUTPATIENT)
Dept: ANTICOAGULATION | Facility: CLINIC | Age: 78
End: 2018-05-15

## 2018-05-15 DIAGNOSIS — I48.20 CHRONIC ATRIAL FIBRILLATION (H): ICD-10-CM

## 2018-05-15 DIAGNOSIS — Z79.01 LONG-TERM (CURRENT) USE OF ANTICOAGULANTS: ICD-10-CM

## 2018-05-15 LAB — INR BLD: 1.8 (ref 0.86–1.14)

## 2018-05-15 PROCEDURE — 36416 COLLJ CAPILLARY BLOOD SPEC: CPT | Performed by: INTERNAL MEDICINE

## 2018-05-15 PROCEDURE — 85610 PROTHROMBIN TIME: CPT | Mod: QW | Performed by: INTERNAL MEDICINE

## 2018-05-15 NOTE — PROGRESS NOTES
ANTICOAGULATION FOLLOW-UP CLINIC VISIT    Patient Name:  Tad Manning  Date:  5/15/2018  Contact Type:  Telephone    SUBJECTIVE:     Patient Findings     Positives Change in diet/appetite (he is increasing the amount of greens he is eating)           OBJECTIVE    INR Point of Care   Date Value Ref Range Status   05/15/2018 1.8 (H) 0.86 - 1.14 Final     Comment:     This test is intended for monitoring Coumadin therapy.  Results are not   accurate in patients with prolonged INR due to factor deficiency.         ASSESSMENT / PLAN  INR assessment SUB    Recheck INR In: 1 WEEK    INR Location Clinic      Anticoagulation Summary as of 5/15/2018     INR goal 2.0-3.0   Today's INR 1.8!   Maintenance plan No maintenance plan   Full instructions 5/15: 7.5 mg; 5/16: 5 mg; 5/17: 2.5 mg; 5/18: 5 mg; 5/19: 5 mg; 5/20: 2.5 mg; 5/21: 5 mg; Otherwise No maintenance plan   Next INR check 5/22/2018   Priority INR   Target end date Indefinite    Indications   Atrial fibrillation (H) [I48.91] [I48.91]  Long-term (current) use of anticoagulants [Z79.01] [Z79.01]         Anticoagulation Episode Summary     INR check location     Preferred lab     Send INR reminders to [facility]    Comments HIPPA Infor returned. OK to leave message on cell [phone]. OK to leave messages with Seferino Manning [phone]  labs @ [facility] faxed there 2/16.  fax: [phone]  Goes by [facility]      Anticoagulation Care Providers     Provider Role Specialty Phone number    Clarisse Valdes MD [facility] Cardiology [phone]            See the Encounter Report to view Anticoagulation Flowsheet and Dosing Calendar (Go to Encounters tab in chart review, and find the Anticoagulation Therapy Visit)    Spoke with Tad.  He might need warfarin 2.5 mg one day/week and 5 mg all other days of the week.  He is increasing the amount of greens he is eating.      Yoselin Hunter RN

## 2018-05-15 NOTE — MR AVS SNAPSHOT
Tad Manning   5/15/2018   Anticoagulation Therapy Visit    Description:  77 year old male   Provider:  Yoselin Hunter, RN   Department:  [facility]           INR as of 5/15/2018     Today's INR 1.8!      Anticoagulation Summary as of 5/15/2018     INR goal 2.0-3.0   Today's INR 1.8!   Full instructions 5/15: 7.5 mg; 5/16: 5 mg; 5/17: 2.5 mg; 5/18: 5 mg; 5/19: 5 mg; 5/20: 2.5 mg; 5/21: 5 mg; Otherwise No maintenance plan   Next INR check 5/22/2018    Indications   Atrial fibrillation (H) [I48.91] [I48.91]  Long-term (current) use of anticoagulants [Z79.01] [Z79.01]         May 2018 Details    Sun Mon Tue Wed Thu Fri Sat       1               2               3               4               5                 6               7               8               9               10               11               12                 13               14               15      7.5 mg   See details      16      5 mg         17      2.5 mg         18      5 mg         19      5 mg           20      2.5 mg         21      5 mg         22            23               24               25               26                 27               28               29               30               31                  Date Details   05/15 This INR check       Date of next INR:  5/22/2018         How to take your warfarin dose     To take:  2.5 mg Take 0.5 of a 5 mg tablet.    To take:  5 mg Take 1 of the 5 mg tablets.    To take:  7.5 mg Take 1.5 of the 5 mg tablets.

## 2018-05-18 ENCOUNTER — OFFICE VISIT (OUTPATIENT)
Dept: CARDIOLOGY | Facility: CLINIC | Age: 78
End: 2018-05-18
Attending: PHYSICIAN ASSISTANT
Payer: MEDICARE

## 2018-05-18 ENCOUNTER — ANTICOAGULATION THERAPY VISIT (OUTPATIENT)
Dept: ANTICOAGULATION | Facility: CLINIC | Age: 78
End: 2018-05-18

## 2018-05-18 VITALS
HEART RATE: 66 BPM | BODY MASS INDEX: 26.82 KG/M2 | SYSTOLIC BLOOD PRESSURE: 116 MMHG | HEIGHT: 72 IN | WEIGHT: 198 LBS | DIASTOLIC BLOOD PRESSURE: 75 MMHG | OXYGEN SATURATION: 98 %

## 2018-05-18 DIAGNOSIS — I48.20 CHRONIC ATRIAL FIBRILLATION (H): ICD-10-CM

## 2018-05-18 DIAGNOSIS — I50.22 CHRONIC SYSTOLIC HEART FAILURE (H): Primary | ICD-10-CM

## 2018-05-18 DIAGNOSIS — I25.10 CORONARY ARTERY DISEASE INVOLVING NATIVE CORONARY ARTERY OF NATIVE HEART WITHOUT ANGINA PECTORIS: ICD-10-CM

## 2018-05-18 DIAGNOSIS — I50.22 CHRONIC SYSTOLIC HEART FAILURE (H): ICD-10-CM

## 2018-05-18 DIAGNOSIS — Z79.01 LONG-TERM (CURRENT) USE OF ANTICOAGULANTS: ICD-10-CM

## 2018-05-18 LAB
ANION GAP SERPL CALCULATED.3IONS-SCNC: 8 MMOL/L (ref 3–14)
BUN SERPL-MCNC: 29 MG/DL (ref 7–30)
CALCIUM SERPL-MCNC: 9.5 MG/DL (ref 8.5–10.1)
CHLORIDE SERPL-SCNC: 101 MMOL/L (ref 94–109)
CO2 SERPL-SCNC: 28 MMOL/L (ref 20–32)
CREAT SERPL-MCNC: 1.16 MG/DL (ref 0.66–1.25)
GFR SERPL CREATININE-BSD FRML MDRD: 61 ML/MIN/1.7M2
GLUCOSE SERPL-MCNC: 126 MG/DL (ref 70–99)
INR PPP: 2.17 (ref 0.86–1.14)
POTASSIUM SERPL-SCNC: 4.1 MMOL/L (ref 3.4–5.3)
SODIUM SERPL-SCNC: 137 MMOL/L (ref 133–144)

## 2018-05-18 PROCEDURE — 93010 ELECTROCARDIOGRAM REPORT: CPT | Mod: ZP | Performed by: INTERNAL MEDICINE

## 2018-05-18 PROCEDURE — 99214 OFFICE O/P EST MOD 30 MIN: CPT | Mod: ZP | Performed by: PHYSICIAN ASSISTANT

## 2018-05-18 PROCEDURE — 80048 BASIC METABOLIC PNL TOTAL CA: CPT | Performed by: INTERNAL MEDICINE

## 2018-05-18 PROCEDURE — 93005 ELECTROCARDIOGRAM TRACING: CPT | Mod: ZF

## 2018-05-18 PROCEDURE — 36415 COLL VENOUS BLD VENIPUNCTURE: CPT | Performed by: INTERNAL MEDICINE

## 2018-05-18 PROCEDURE — 85610 PROTHROMBIN TIME: CPT | Performed by: INTERNAL MEDICINE

## 2018-05-18 PROCEDURE — G0463 HOSPITAL OUTPT CLINIC VISIT: HCPCS | Mod: 25,ZF

## 2018-05-18 RX ORDER — FUROSEMIDE 20 MG
80 TABLET ORAL 2 TIMES DAILY
Qty: 180 TABLET | Refills: 3
Start: 2018-05-18 | End: 2018-05-29

## 2018-05-18 RX ORDER — METOPROLOL SUCCINATE 200 MG/1
100 TABLET, EXTENDED RELEASE ORAL DAILY
Qty: 90 TABLET | Refills: 3
Start: 2018-05-18 | End: 2018-12-26

## 2018-05-18 ASSESSMENT — PAIN SCALES - GENERAL: PAINLEVEL: NO PAIN (0)

## 2018-05-18 NOTE — MR AVS SNAPSHOT
"              After Visit Summary   2018    Tad Manning    MRN: [id number]           Patient Information     Date Of Birth          1940        Visit Information        Provider Department      2018 11:00 AM Rasheeda Stephen PA-C [facility] Heart Care        Today's Diagnoses     Chronic atrial fibrillation (H)    -  1    Chronic systolic heart failure (H)          Care Instructions    You were seen today in the Cardiovascular Clinic at the [facility].     Cardiology Providers you saw during your visit: DICK Daly     Follow up and medication changes:    1. Stop to get labs on your way out. Will schedule appt  2. Increase Lasix to 80 mg two times a day.   3. Decrease Metoprolol to 100 mg daily.   4. Follow up in CORE Clinic with Eun Stephen in 2-4 weeks.         Please limit your fluid intake to 2 L (68 ounces) daily.  2 Liters a day = 8.5 cups, or 68 ounces.  Please limit your salt intake to 2 grams a day or less.     If you gain 2# in 24 hours or 5# in one week call Verónica Jarquin RN so we can adjust your medications as needed over the phone.     Please feel free to call me with any questions or concerns.       Verónica Jarquin RN  [facility]  Cardiology Care Coordinator-Heart Failure Clinic     Questions and [phone].   First press #1 for the [facility] and then press #3 for \"Medical Questions\" to reach us Cardiology Nurses.      On Call Cardiologist for after hours or on weekends: [phone]   option #4 and ask to speak to the on-call Cardiologist. Inform them you are a CORE/heart failure patient at the [facility].           If you need a medication refill please contact your pharmacy.  Please allow 3 business days for your refill to be completed.  _______________________________________________________  C.O.R.E. CLINIC Cardiomyopathy, Optimization, Rehabilitation, Education   The C.O.R.E. CLINIC is a heart " failure specialty clinic within the [facility] where you will work with specialized nurse practitioners dedicated to helping patients with heart failure carefully adjust medications, receive education, and learn who and when to call if symptoms develop. They specialize in helping you better understand your condition, slow the progression of your disease, improve the length and quality of your life, help you detect future heart problems before they become life threatening, and avoid hospitalizations.  As always, thank you for trusting us with your health care needs!             Follow-ups after your visit        Additional Services     Follow-Up with CORE Clinic       Nahun in 2-3 weeks                  Your next 10 appointments already scheduled     May 18, 2018 12:00 PM CDT   Lab with  LAB   [facility] ([facility])    [address] [id number] [phone]            May 22, 2018 10:10 AM CDT   LAB with LAB FIRST FLOOR [facility] ([facility])    [address] [place] [id number] [phone]           Please do not eat 10-12 hours before your appointment if you are coming in fasting for labs on lipids, cholesterol, or glucose (sugar). This does not apply to pregnant women. Water, hot tea and black coffee (with nothing added) are okay. Do not drink other fluids, diet soda or chew gum.            Jun 20, 2018 11:30 AM CDT   Lab with  LAB   [facility] ([facility])    [address] [id number] [phone]            Jun 20, 2018 11:45 AM CDT   (Arrive by 11:30 AM)   CORE RETURN with Rasheeda Stephen PA-C   [facility] ([facility])    [address] [facility]  [address] [place] [id number] [phone]            Jul 16, 2018 11:00 AM CDT   (Arrive by 10:45  AM)   Return Visit with Clarisse Valdes MD   [facility] ([facility])    [address] [facility]  [address] [place] [id number] [phone]              Future tests that were ordered for you today     Open Future Orders        Priority Expected Expires Ordered    Basic metabolic panel Routine 5/25/2018 6/1/2018 5/18/2018    Follow-Up with CORE Clinic Routine 6/1/2018 8/23/2018 5/18/2018            Who to contact     If you have questions or need follow up information about today's clinic visit or your schedule please contact [facility] directly at [phone].  Normal or non-critical lab and imaging results will be communicated to you by MyChart, letter or phone within 4 business days after the clinic has received the results. If you do not hear from us within 7 days, please contact the clinic through [employer] or phone. If you have a critical or abnormal lab result, we will notify you by phone as soon as possible.  Submit refill requests through [employer] or call your pharmacy and they will forward the refill request to us. Please allow 3 business days for your refill to be completed.          Additional Information About Your Visit        [employer] Information     [employer] gives you secure access to your electronic health record. If you see a primary care provider, you can also send messages to your care team and make appointments. If you have questions, please call your primary care clinic.  If you do not have a primary care provider, please call [phone] and they will assist you.        Care EveryWhere ID     This is your Care EveryWhere ID. This could be used by other organizations to access your [facility] medical records  [id number]        Your Vitals Were     Pulse Height Pulse Oximetry BMI (Body Mass Index)          66 1.829 m (6') 98% 26.85 kg/m2         Blood Pressure from Last 3 Encounters:   05/18/18 116/75   05/02/18 108/70   04/16/18 109/68    Weight from  Last 3 Encounters:   05/18/18 89.8 kg (198 lb)   05/02/18 90.6 kg (199 lb 11.2 oz)   04/16/18 89.7 kg (197 lb 12.8 oz)              We Performed the Following     EKG 12-lead, tracing only (Same Day)     Follow-Up with CORE Clinic          Today's Medication Changes          These changes are accurate as of 5/18/18 11:59 AM.  If you have any questions, ask your nurse or doctor.               These medicines have changed or have updated prescriptions.        Dose/Directions    furosemide 20 MG tablet   Commonly known as:  LASIX   This may have changed:  how much to take   Used for:  Chronic systolic heart failure (H)   Changed by:  Rasheeda Stephen PA-C        Dose:  80 mg   Take 4 tablets (80 mg) by mouth 2 times daily   Quantity:  180 tablet   Refills:  3       metoprolol succinate 200 MG 24 hr tablet   Commonly known as:  TOPROL-XL   This may have changed:  how much to take   Used for:  Chronic systolic heart failure (H)   Changed by:  Rasheeda Stephen PA-C        Dose:  100 mg   Take 0.5 tablets (100 mg) by mouth daily   Quantity:  90 tablet   Refills:  3       spironolactone 25 MG tablet   Commonly known as:  ALDACTONE   This may have changed:    - how much to take  - additional instructions   Used for:  Chronic systolic congestive heart failure (H)        Dose:  25 mg   Take 1 tablet (25 mg) by mouth daily   Quantity:  45 tablet   Refills:  3            Where to get your medicines      Some of these will need a paper prescription and others can be bought over the counter.  Ask your nurse if you have questions.     You don't need a prescription for these medications     furosemide 20 MG tablet    metoprolol succinate 200 MG 24 hr tablet                Primary Care Provider Office Phone # Fax #    Gene Guevara [phone]       [address] [address]        Equal Access to Services     STUART ELLISON AH: kellie Story  jammie mirellakelley kumardeandre woodruff. So [facility] [phone].    ATENCIÓN: Si fan santillan, tiene a noe disposición servicios gratuitos de asistencia lingüística. Joey al [phone].    We comply with applicable federal civil rights laws and [place] laws. We do not discriminate on the basis of race, color, national origin, age, disability, sex, sexual orientation, or gender identity.            Thank you!     Thank you for choosing [facility]  for your care. Our goal is always to provide you with excellent care. Hearing back from our patients is one way we can continue to improve our services. Please take a few minutes to complete the written survey that you may receive in the mail after your visit with us. Thank you!             Your Updated Medication List - Protect others around you: Learn how to safely use, store and throw away your medicines at www.disposemymeds.org.          This list is accurate as of 5/18/18 11:59 AM.  Always use your most recent med list.                   Brand Name Dispense Instructions for use Diagnosis    acetaminophen 325 MG tablet    TYLENOL     Take 325 mg by mouth every 4 hours as needed for mild pain        ALPRAZolam 0.25 MG tablet    XANAX     TK 1 T PO QHS PRN    Epidural abscess       atorvastatin 20 MG tablet    LIPITOR    90 tablet    TAKE 1 TABLET BY MOUTH EVERY DAY    Claudication of left lower extremity (H)       CELEXA PO      Take 10 mg by mouth daily        cholecalciferol 1000 units Tabs     30 tablet    Take 1,000 Units by mouth daily    Loop diuretic causing adverse effect in therapeutic use, subsequent encounter       clopidogrel 75 MG tablet    PLAVIX    90 tablet    Take 1 tablet (75 mg) by mouth daily    Status post percutaneous transluminal coronary angioplasty, Coronary artery disease involving native coronary artery of native heart without angina pectoris       furosemide 20 MG tablet    LASIX    180 tablet     Take 4 tablets (80 mg) by mouth 2 times daily    Chronic systolic heart failure (H)       lisinopril 10 MG tablet    PRINIVIL/ZESTRIL    30 tablet    Take 1 tablet (10 mg) by mouth daily        MELATONIN PO      Take 5 mg by mouth daily        metFORMIN 500 MG tablet    GLUCOPHAGE    60 tablet    Take 1 tablet (500 mg) by mouth 2 times daily (with meals)    Type 2 diabetes mellitus with diabetic polyneuropathy, without long-term current use of insulin (H)       metoprolol succinate 200 MG 24 hr tablet    TOPROL-XL    90 tablet    Take 0.5 tablets (100 mg) by mouth daily    Chronic systolic heart failure (H)       nitroGLYcerin 0.4 MG sublingual tablet    NITROSTAT    25 tablet    For chest pain place 1 tablet under the tongue every 5 minutes for 3 doses. If symptoms persist 5 minutes after 1st dose call 911.    Atrial flutter, unspecified type (H)       nystatin cream    MYCOSTATIN     Apply topically 2 times daily        order for DME     1 Device    Equipment being ordered: [facility] shoe    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), Diabetic ulcer of left foot due to type 2 diabetes mellitus (H)       ranitidine 150 MG tablet    ZANTAC    60 tablet    Take 1 tablet (150 mg) by mouth 2 times daily    Gastroesophageal reflux disease without esophagitis       sodium chloride 0.65 % nasal spray    EQ SALINE NASAL SPRAY    1 Bottle    Spray 1 spray into both nostrils daily as needed for congestion        spironolactone 25 MG tablet    ALDACTONE    45 tablet    Take 1 tablet (25 mg) by mouth daily    Chronic systolic congestive heart failure (H)       tamsulosin 0.4 MG capsule    FLOMAX    60 capsule    Take 2 capsules (0.8 mg) by mouth At Bedtime    Renal insufficiency       warfarin 5 MG tablet    COUMADIN    90 tablet    Take 1 tablet (5 mg) by mouth daily Dose adjusted per Coumadin Clinic.    Paroxysmal atrial fibrillation (H)

## 2018-05-18 NOTE — NURSING NOTE
Diet: Patient instructed regarding a heart healthy diet, including discussion of reduced fat and sodium intake. Patient demonstrated understanding of this information and agreed to call with further questions or concerns.  Labs: Patient was given results of the laboratory testing obtained today. Patient was instructed to return for the next laboratory testing on his way out. Patient demonstrated understanding of this information and agreed to call with further questions or concerns.   Return Appointment: Patient given instructions regarding scheduling next clinic visit. Patient demonstrated understanding of this information and agreed to call with further questions or concerns.  Medication Change: Patient was educated regarding prescribed medication change, including discussion of the indication, administration, side effects, and when to report to MD or RN. Patient demonstrated understanding of this information and agreed to call with further questions or concerns.  Patient stated he understood all health information given and agreed to call with further questions or concerns.   Verónica Jarquin RN

## 2018-05-18 NOTE — PROGRESS NOTES
CARDIOLOGY CLINIC   Tad Manning is a 77 year old male with CLL in remission, CAD s/p 3 vessel CAB (LIMA-LAD, SVG-Susana, SVG-D1) in 1995, ischemic cardiomyopathy with EF 20%, atrial flutter, PAD s/p iliac artery stents and LLE bypass and DM who presents to CORE clinic for follow up. He also had 2 stents to the mid LAD in February 2018.    I last saw him 2 weeks ago at which time he was volume up so we increased Lasix to 40mg twice daily. He has been responding well. His home weight is 194.4 (down 3 pounds). Last time he was having a lot of swelling and discomfort in his knees which has since improved greatly. He still gets leg swelling intermittently. He is walking with a walker, has 1 block dyspnea. Hasn't gotten cardiac rehab yet, he feels he has been so busy with doctor's appointment. He denies any chest pain or heaviness, palpitations, lightheadedness or dizziness.    His roommate is here with him today and notes that his stamina and pace has improved greatly since having the stents placed. He did not have labs today yet, he didn't think he had to again.     PAST MEDICAL HISTORY: reviewed, no new changes    SOCIAL HISTORY: here today with his roommate, his brother who was with him last time is on vacation. +60pack year history- quit over 20 years ago    CURRENT MEDICATIONS:  Current Outpatient Prescriptions   Medication Sig Dispense Refill     acetaminophen (TYLENOL) 325 MG tablet Take 325 mg by mouth every 4 hours as needed for mild pain       ALPRAZolam (XANAX) 0.25 MG tablet TK 1 T PO QHS PRN  1     atorvastatin (LIPITOR) 20 MG tablet TAKE 1 TABLET BY MOUTH EVERY DAY 90 tablet 3     cholecalciferol 1000 UNITS TABS Take 1,000 Units by mouth daily 30 tablet      Citalopram Hydrobromide (CELEXA PO) Take 10 mg by mouth daily       clopidogrel (PLAVIX) 75 MG tablet Take 1 tablet (75 mg) by mouth daily 90 tablet 3     furosemide (LASIX) 20 MG tablet Take 2 tablets (40 mg) by mouth 2 times daily 180 tablet 3      lisinopril (PRINIVIL/ZESTRIL) 10 MG tablet Take 1 tablet (10 mg) by mouth daily 30 tablet      MELATONIN PO Take 5 mg by mouth daily       metFORMIN (GLUCOPHAGE) 500 MG tablet Take 1 tablet (500 mg) by mouth 2 times daily (with meals) 60 tablet      metoprolol (TOPROL-XL) 200 MG 24 hr tablet Take 1 tablet (200 mg) by mouth daily 90 tablet 3     nitroglycerin (NITROSTAT) 0.4 MG sublingual tablet For chest pain place 1 tablet under the tongue every 5 minutes for 3 doses. If symptoms persist 5 minutes after 1st dose call 911. 25 tablet      order for DME Equipment being ordered: DH2 shoe 1 Device 0     ranitidine (ZANTAC) 150 MG tablet Take 1 tablet (150 mg) by mouth 2 times daily 60 tablet      sodium chloride (EQ SALINE NASAL SPRAY) 0.65 % nasal spray Spray 1 spray into both nostrils daily as needed for congestion 1 Bottle 1     spironolactone (ALDACTONE) 25 MG tablet Take 1 tablet (25 mg) by mouth daily (Patient taking differently: Take 12.5 mg by mouth daily Half tablet) 45 tablet 3     tamsulosin (FLOMAX) 0.4 MG capsule Take 2 capsules (0.8 mg) by mouth At Bedtime 60 capsule      warfarin (COUMADIN) 5 MG tablet Take 1 tablet (5 mg) by mouth daily Dose adjusted per Coumadin Clinic. 90 tablet 3     nystatin (MYCOSTATIN) cream Apply topically 2 times daily       ROS:   Constitutional: No fever or chills. Variable appetite.   ENT: No visual changes, epistaxis, sore throat.   Respiratory: No significant cough or hemoptysis   CV: As per HPI.   GI: No vomiting, hematemesis, melena, or hematochezia.   : No dysuria, or hematuria. +frequency with diuretics  MSK: No joint redness, swelling or pain.   Integument: No rash, pururitis.   Psychiatric: No depression, anxiety, insomnia.   Neuro: No focal weakness, headaches or syncope   Endocrinology: No heat/cold intolerance, polyuria, polydipsia.     EXAM:  /75 (BP Location: Left arm, Patient Position: Chair, Cuff Size: Adult Regular)  Pulse 66  Ht 1.829 m (6')  Wt  89.8 kg (198 lb)  SpO2 98%  BMI 26.85 kg/m2  General: seated in chair, in NAD, pale, walker with him  Eyes: anicteric sclera, EOMI  CV: irregular, normal S1/S2, no murmur, gallop or rub noted. JVP 12cm  Lungs: diminished/crackles at bases, apices clear, no rales, wheezing, or rhonchi  GI: ND, +BS, soft, NT  MSK: tr-1+ edema to knees  Skin: no rashes noted  Neurological: normal speech, alert and conversing appropriately   Psych: pleasant mood and appropriate affect    Labs:  CMP RESULTS:  Lab Results   Component Value Date     05/08/2018    POTASSIUM 5.1 05/08/2018    CHLORIDE 103 05/08/2018    CO2 29 05/08/2018    ANIONGAP 6 05/08/2018     (H) 05/08/2018    BUN 32 (H) 05/08/2018    CR 1.17 05/08/2018    GFRESTIMATED 61 05/08/2018    GFRESTBLACK 73 05/08/2018    JUSTINO 9.3 05/08/2018    BILITOTAL 1.4 (H) 04/09/2018    ALBUMIN 3.4 04/09/2018    ALKPHOS 134 04/09/2018    ALT 33 04/09/2018    AST 17 04/09/2018        Assessment and Plan: Hima is a very pleasant 77 year old male with complicating medical history including chronic systolic heart failure who presents to CORE clinic for 2 week follow up of volume overload. He seems to be responding since the last dose change.     1. Chronic systolic heart failure secondary to ischemic cardiomyopathy, Stage C, NYHA class III  Volume- still hypervolemia, increase Lasix to 80mg twice daily  ACEI/ARB- yes on lisinopril 10mg daily, consider increase pending lab results today  BB- yes, on Toprol XL 200mg daily- will decrease to 100mg daily given his volume overload and history of low output on RHC in 2/2018.  Aldosterone antagonist- yes, on spironolactone 12.5mg daily- would like to increase though potassium jumped quite a bit last week, will await today's labs before deciding.   SCD prophylaxis- r/p revascularization almost 3 months ago, will optimize medical therapy and then consider repeat echocardiogram in a few months.     2. Coronary artery disease  S/p recent  stents, did seem to have some improvement ney noted by roommate.  -Continue Plavix, BB and statin. LDL at goal last month.  -No aspirin secondary to already on dual therapy with plavix and warfarin    Follow up: Labs today, follow up with me in 2 weeks, Dr. Valdes in July as already arranged- consider repeat Echo with that appointment.       Rasheeda Stephen PA-C  [facility] Heart Care  Pager [phone]

## 2018-05-18 NOTE — NURSING NOTE
Chief Complaint   Patient presents with     Follow Up For      manage atrial fibrillation/labs prior to visit     Vitals were taken and medications were reconciled. EKG was performed    Karian ROSA  10:55 AM

## 2018-05-18 NOTE — PATIENT INSTRUCTIONS
"You were seen today in the Cardiovascular Clinic at the [facility].     Cardiology Providers you saw during your visit: DICK Daly     Follow up and medication changes:    1. Stop to get labs on your way out. Will schedule appt  2. Increase Lasix to 80 mg two times a day.   3. Decrease Metoprolol to 100 mg daily.   4. Follow up in CORE Clinic with Eun Stephen in 2-4 weeks.         Please limit your fluid intake to 2 L (68 ounces) daily.  2 Liters a day = 8.5 cups, or 68 ounces.  Please limit your salt intake to 2 grams a day or less.     If you gain 2# in 24 hours or 5# in one week call Verónica Jarquin RN so we can adjust your medications as needed over the phone.     Please feel free to call me with any questions or concerns.       Verónica Jarquin RN  [facility]  Cardiology Care Coordinator-Heart Failure Clinic     Questions and [phone].   First press #1 for the [facility] and then press #3 for \"Medical Questions\" to reach us Cardiology Nurses.      On Call Cardiologist for after hours or on weekends: [phone]   option #4 and ask to speak to the on-call Cardiologist. Inform them you are a CORE/heart failure patient at the [facility].           If you need a medication refill please contact your pharmacy.  Please allow 3 business days for your refill to be completed.  _______________________________________________________  C.O.R.E. CLINIC Cardiomyopathy, Optimization, Rehabilitation, Education   The C.O.R.E. CLINIC is a heart failure specialty clinic within the [facility] where you will work with specialized nurse practitioners dedicated to helping patients with heart failure carefully adjust medications, receive education, and learn who and when to call if symptoms develop. They specialize in helping you better understand your condition, slow the progression of your disease, improve the length and " quality of your life, help you detect future heart problems before they become life threatening, and avoid hospitalizations.  As always, thank you for trusting us with your health care needs!

## 2018-05-18 NOTE — PROGRESS NOTES
ANTICOAGULATION FOLLOW-UP CLINIC VISIT    Patient Name:  Tad Manning  Date:  5/18/2018  Contact Type:  Telephone    SUBJECTIVE:     Patient Findings     Comments Patient is requesting next lab test 5/29.           OBJECTIVE    INR   Date Value Ref Range Status   05/18/2018 2.17 (H) 0.86 - 1.14 Final       ASSESSMENT / PLAN  No question data found.  Anticoagulation Summary as of 5/18/2018     INR goal 2.0-3.0   Today's INR 2.17   Maintenance plan 2.5 mg (5 mg x 0.5) on Fri; 5 mg (5 mg x 1) all other days   Full instructions 5/18: 5 mg; 5/19: 5 mg; 5/20: 2.5 mg; 5/21: 5 mg; Otherwise 2.5 mg on Fri; 5 mg all other days   Weekly total 32.5 mg   Plan last modified Vicki Hearn RN (5/18/2018)   Next INR check 5/29/2018   Priority INR   Target end date Indefinite    Indications   Atrial fibrillation (H) [I48.91] [I48.91]  Long-term (current) use of anticoagulants [Z79.01] [Z79.01]         Anticoagulation Episode Summary     INR check location     Preferred lab     Send INR reminders to [facility] CL[facility]    Comments HIPPA Infor returned. OK to leave message on cell [phone]. OK to leave messages with Seferino Manning [phone]  labs @ [facility] faxed there 2/16.  fax: [phone]  Goes by Hima      Anticoagulation Care Providers     Provider Role Specialty Phone number    Clarisse Valdes MD [facility] Cardiology [phone]            See the Encounter Report to view Anticoagulation Flowsheet and Dosing Calendar (Go to Encounters tab in chart review, and find the Anticoagulation Therapy Visit)    Spoke with Tad.     Vicki Hearn, RN

## 2018-05-18 NOTE — MR AVS SNAPSHOT
Tad Manning   5/18/2018   Anticoagulation Therapy Visit    Description:  77 year old male   Provider:  Vicki Hearn, RN   Department:  [facility]           INR as of 5/18/2018     Today's INR 2.17      Anticoagulation Summary as of 5/18/2018     INR goal 2.0-3.0   Today's INR 2.17   Full instructions 5/18: 5 mg; 5/19: 5 mg; 5/20: 2.5 mg; 5/21: 5 mg; Otherwise 2.5 mg on Fri; 5 mg all other days   Next INR check 5/29/2018    Indications   Atrial fibrillation (H) [I48.91] [I48.91]  Long-term (current) use of anticoagulants [Z79.01] [Z79.01]         May 2018 Details    Sun Mon Tue Wed Thu Fri Sat       1               2               3               4               5                 6               7               8               9               10               11               12                 13               14               15               16               17               18      5 mg   See details      19      5 mg           20      2.5 mg         21      5 mg         22      5 mg         23      5 mg         24      5 mg         25      2.5 mg         26      5 mg           27      5 mg         28      5 mg         29            30               31                  Date Details   05/18 This INR check       Date of next INR:  5/29/2018         How to take your warfarin dose     To take:  2.5 mg Take 0.5 of a 5 mg tablet.    To take:  5 mg Take 1 of the 5 mg tablets.

## 2018-05-18 NOTE — LETTER
5/18/2018      RE: Tad Manning  [address] [address] [address]       Dear Colleague,    Thank you for the opportunity to participate in the care of your patient, Tad Manning, at the [facility]. Please see a copy of my visit note below.    CARDIOLOGY CLINIC   Tad Manning is a 77 year old male with CLL in remission, CAD s/p 3 vessel CAB (LIMA-LAD, SVG-Susana, SVG-D1) in 1995, ischemic cardiomyopathy with EF 20%, atrial flutter, PAD s/p iliac artery stents and LLE bypass and DM who presents to [facility] clinic for follow up. He also had 2 stents to the mid LAD in February 2018.    I last saw him 2 weeks ago at which time he was volume up so we increased Lasix to 40mg twice daily. He has been responding well. His home weight is 194.4 (down 3 pounds). Last time he was having a lot of swelling and discomfort in his knees which has since improved greatly. He still gets leg swelling intermittently. He is walking with a walker, has 1 block dyspnea. Hasn't gotten cardiac rehab yet, he feels he has been so busy with doctor's appointment. He denies any chest pain or heaviness, palpitations, lightheadedness or dizziness.    His roommate is here with him today and notes that his stamina and pace has improved greatly since having the stents placed. He did not have labs today yet, he didn't think he had to again.     PAST MEDICAL HISTORY: reviewed, no new changes    SOCIAL HISTORY: here today with his roommate, his brother who was with him last time is on vacation. +60pack year history- quit over 20 years ago    CURRENT MEDICATIONS:  Current Outpatient Prescriptions   Medication Sig Dispense Refill     acetaminophen (TYLENOL) 325 MG tablet Take 325 mg by mouth every 4 hours as needed for mild pain       ALPRAZolam (XANAX) 0.25 MG tablet TK 1 T PO QHS PRN  1     atorvastatin (LIPITOR) 20 MG tablet TAKE 1 TABLET BY MOUTH EVERY DAY 90  tablet 3     cholecalciferol 1000 UNITS TABS Take 1,000 Units by mouth daily 30 tablet      Citalopram Hydrobromide (CELEXA PO) Take 10 mg by mouth daily       clopidogrel (PLAVIX) 75 MG tablet Take 1 tablet (75 mg) by mouth daily 90 tablet 3     furosemide (LASIX) 20 MG tablet Take 2 tablets (40 mg) by mouth 2 times daily 180 tablet 3     lisinopril (PRINIVIL/ZESTRIL) 10 MG tablet Take 1 tablet (10 mg) by mouth daily 30 tablet      MELATONIN PO Take 5 mg by mouth daily       metFORMIN (GLUCOPHAGE) 500 MG tablet Take 1 tablet (500 mg) by mouth 2 times daily (with meals) 60 tablet      metoprolol (TOPROL-XL) 200 MG 24 hr tablet Take 1 tablet (200 mg) by mouth daily 90 tablet 3     nitroglycerin (NITROSTAT) 0.4 MG sublingual tablet For chest pain place 1 tablet under the tongue every 5 minutes for 3 doses. If symptoms persist 5 minutes after 1st dose call 911. 25 tablet      order for DME Equipment being ordered: 2 shoe 1 Device 0     ranitidine (ZANTAC) 150 MG tablet Take 1 tablet (150 mg) by mouth 2 times daily 60 tablet      sodium chloride (EQ SALINE NASAL SPRAY) 0.65 % nasal spray Spray 1 spray into both nostrils daily as needed for congestion 1 Bottle 1     spironolactone (ALDACTONE) 25 MG tablet Take 1 tablet (25 mg) by mouth daily (Patient taking differently: Take 12.5 mg by mouth daily Half tablet) 45 tablet 3     tamsulosin (FLOMAX) 0.4 MG capsule Take 2 capsules (0.8 mg) by mouth At Bedtime 60 capsule      warfarin (COUMADIN) 5 MG tablet Take 1 tablet (5 mg) by mouth daily Dose adjusted per [facility]. 90 tablet 3     nystatin (MYCOSTATIN) cream Apply topically 2 times daily       ROS:   Constitutional: No fever or chills. Variable appetite.   ENT: No visual changes, epistaxis, sore throat.   Respiratory: No significant cough or hemoptysis   CV: As per HPI.   GI: No vomiting, hematemesis, melena, or hematochezia.   : No dysuria, or hematuria. +frequency with diuretics  MSK: No joint redness,  swelling or pain.   Integument: No rash, pururitis.   Psychiatric: No depression, anxiety, insomnia.   Neuro: No focal weakness, headaches or syncope   Endocrinology: No heat/cold intolerance, polyuria, polydipsia.     EXAM:  /75 (BP Location: Left arm, Patient Position: Chair, Cuff Size: Adult Regular)  Pulse 66  Ht 1.829 m (6')  Wt 89.8 kg (198 lb)  SpO2 98%  BMI 26.85 kg/m2  General: seated in chair, in NAD, pale, walker with him  Eyes: anicteric sclera, EOMI  CV: irregular, normal S1/S2, no murmur, gallop or rub noted. JVP 12cm  Lungs: diminished/crackles at bases, apices clear, no rales, wheezing, or rhonchi  GI: ND, +BS, soft, NT  MSK: tr-1+ edema to knees  Skin: no rashes noted  Neurological: normal speech, alert and conversing appropriately   Psych: pleasant mood and appropriate affect    Labs:  CMP RESULTS:  Lab Results   Component Value Date     05/08/2018    POTASSIUM 5.1 05/08/2018    CHLORIDE 103 05/08/2018    CO2 29 05/08/2018    ANIONGAP 6 05/08/2018     (H) 05/08/2018    BUN 32 (H) 05/08/2018    CR 1.17 05/08/2018    GFRESTIMATED 61 05/08/2018    GFRESTBLACK 73 05/08/2018    JUSTINO 9.3 05/08/2018    BILITOTAL 1.4 (H) 04/09/2018    ALBUMIN 3.4 04/09/2018    ALKPHOS 134 04/09/2018    ALT 33 04/09/2018    AST 17 04/09/2018      Assessment and Plan: Hima is a very pleasant 77 year old male with complicating medical history including chronic systolic heart failure who presents to [facility] clinic for 2 week follow up of volume overload. He seems to be responding since the last dose change.     1. Chronic systolic heart failure secondary to ischemic cardiomyopathy, Stage C, NYHA class III  Volume- still hypervolemia, increase Lasix to 80mg twice daily  ACEI/ARB- yes on lisinopril 10mg daily, consider increase pending lab results today  BB- yes, on Toprol XL 200mg daily- will decrease to 100mg daily given his volume overload and history of low output on RHC in 2/2018.  Aldosterone  antagonist- yes, on spironolactone 12.5mg daily- would like to increase though potassium jumped quite a bit last week, will await today's labs before deciding.   SCD prophylaxis- r/p revascularization almost 3 months ago, will optimize medical therapy and then consider repeat echocardiogram in a few months.     2. Coronary artery disease  S/p recent stents, did seem to have some improvement ney noted by roommate.  -Continue Plavix, BB and statin. LDL at goal last month.  -No aspirin secondary to already on dual therapy with plavix and warfarin    Follow up: Labs today, follow up with me in 2 weeks, Dr. Valdes in July as already arranged- consider repeat Echo with that appointment.     Rasheeda Stephen PA-C  [facility]  Pager [phone]

## 2018-05-21 ENCOUNTER — CARE COORDINATION (OUTPATIENT)
Dept: CARDIOLOGY | Facility: CLINIC | Age: 78
End: 2018-05-21

## 2018-05-21 LAB — INTERPRETATION ECG - MUSE: NORMAL

## 2018-05-21 NOTE — PROGRESS NOTES
Called Hima to review lab results and give following order per Eun de leon:  Date: 5/21/2018    Time of Call: 1:18 PM     Diagnosis:  Heart failure     [ VORB ] Ordering provider: Eun De Leon  Order: Increase Spironolactone to 25 mg daily. Repeat BMP in 5 days.      Order received by: Verónica Jarquin RN      Follow-up/additional notes: Hima is already scheduled to get labs next Tuesday. Will increase [place] on Thursday 5/24.

## 2018-05-22 DIAGNOSIS — I50.22 CHRONIC SYSTOLIC CONGESTIVE HEART FAILURE (H): ICD-10-CM

## 2018-05-22 RX ORDER — SPIRONOLACTONE 25 MG/1
25 TABLET ORAL DAILY
Qty: 45 TABLET | Refills: 3 | Status: SHIPPED | OUTPATIENT
Start: 2018-05-22 | End: 2018-06-20

## 2018-05-22 NOTE — PROGRESS NOTES
Hima called back to let us know we had already increased [place] back on 5/2 so he has been on the 25 mg dose since then. Told him to continue his current regimen and I would let Eun know. Will call back with any further recommendations.   Verónica Jarquin RN

## 2018-05-29 ENCOUNTER — CARE COORDINATION (OUTPATIENT)
Dept: CARDIOLOGY | Facility: CLINIC | Age: 78
End: 2018-05-29

## 2018-05-29 ENCOUNTER — ANTICOAGULATION THERAPY VISIT (OUTPATIENT)
Dept: ANTICOAGULATION | Facility: CLINIC | Age: 78
End: 2018-05-29

## 2018-05-29 DIAGNOSIS — I48.20 CHRONIC ATRIAL FIBRILLATION (H): ICD-10-CM

## 2018-05-29 DIAGNOSIS — Z79.01 LONG-TERM (CURRENT) USE OF ANTICOAGULANTS: ICD-10-CM

## 2018-05-29 DIAGNOSIS — I50.22 CHRONIC SYSTOLIC HEART FAILURE (H): ICD-10-CM

## 2018-05-29 LAB
ANION GAP SERPL CALCULATED.3IONS-SCNC: 7 MMOL/L (ref 3–14)
BUN SERPL-MCNC: 41 MG/DL (ref 7–30)
CALCIUM SERPL-MCNC: 9.3 MG/DL (ref 8.5–10.1)
CHLORIDE SERPL-SCNC: 100 MMOL/L (ref 94–109)
CO2 SERPL-SCNC: 30 MMOL/L (ref 20–32)
CREAT SERPL-MCNC: 1.45 MG/DL (ref 0.66–1.25)
GFR SERPL CREATININE-BSD FRML MDRD: 47 ML/MIN/1.7M2
GLUCOSE SERPL-MCNC: 169 MG/DL (ref 70–99)
INR PPP: 1.57 (ref 0.86–1.14)
POTASSIUM SERPL-SCNC: 4.4 MMOL/L (ref 3.4–5.3)
SODIUM SERPL-SCNC: 137 MMOL/L (ref 133–144)

## 2018-05-29 PROCEDURE — 80048 BASIC METABOLIC PNL TOTAL CA: CPT | Performed by: INTERNAL MEDICINE

## 2018-05-29 PROCEDURE — 85610 PROTHROMBIN TIME: CPT | Performed by: INTERNAL MEDICINE

## 2018-05-29 PROCEDURE — 36415 COLL VENOUS BLD VENIPUNCTURE: CPT | Performed by: INTERNAL MEDICINE

## 2018-05-29 RX ORDER — FUROSEMIDE 20 MG
60 TABLET ORAL 2 TIMES DAILY
Qty: 180 TABLET | Refills: 3 | Status: SHIPPED | OUTPATIENT
Start: 2018-05-29 | End: 2018-06-04

## 2018-05-29 NOTE — MR AVS SNAPSHOT
Tad Tim Mannign   5/29/2018   Anticoagulation Therapy Visit    Description:  77 year old male   Provider:  Chelsea Bland, RN   Department:  [facility]           INR as of 5/29/2018     Today's INR 1.57!      Anticoagulation Summary as of 5/29/2018     INR goal 2.0-3.0   Today's INR 1.57!   Full warfarin instructions 5/29: 7.5 mg; 6/1: 5 mg; Otherwise 2.5 mg on Fri; 5 mg all other days   Next INR check 6/5/2018    Indications   Atrial fibrillation (H) [I48.91] [I48.91]  Long-term (current) use of anticoagulants [Z79.01] [Z79.01]         May 2018 Details    Sun Mon Tue Wed Thu Fri Sat       1               2               3               4               5                 6               7               8               9               10               11               12                 13               14               15               16               17               18               19                 20               21               22               23               24               25               26                 27               28               29      7.5 mg   See details      30      5 mg         31      5 mg            Date Details   05/29 This INR check               How to take your warfarin dose     To take:  5 mg Take 1 of the 5 mg tablets.    To take:  7.5 mg Take 1.5 of the 5 mg tablets.           June 2018 Details    Sun Mon Tue Wed Thu Fri Sat          1      5 mg         2      5 mg           3      5 mg         4      5 mg         5            6               7               8               9                 10               11               12               13               14               15               16                 17               18               19               20               21               22               23                 24               25               26               27               28               29               30                Date  Details   No additional details    Date of next INR:  6/5/2018         How to take your warfarin dose     To take:  5 mg Take 1 of the 5 mg tablets.

## 2018-05-29 NOTE — PROGRESS NOTES
After reviewing labs, the following orders given:     Date: 5/29/2018    Time of Call: 3:19 PM     Diagnosis:  Heart failure     [ TORB ] Ordering provider: Eun JENNINGS    Order: Hold next dose of lasix. Then resume at 60 mg twice a day.      Order received by: Diane Moore RN     Follow-up/additional notes: Please call us if you notice an increase in sob, weight, or lower extremity swelling. Next f/u is 6/20 with Eun.

## 2018-05-29 NOTE — PROGRESS NOTES
ANTICOAGULATION FOLLOW-UP CLINIC VISIT    Patient Name:  Tad Manning  Date:  5/29/2018  Contact Type:  Telephone    SUBJECTIVE:     Patient Findings     Positives Change in diet/appetite (Pt had a spinach salad on Sunday.)           OBJECTIVE    INR   Date Value Ref Range Status   05/29/2018 1.57 (H) 0.86 - 1.14 Final       ASSESSMENT / PLAN  INR assessment SUB    Recheck INR In: 1 WEEK    INR Location Clinic      Anticoagulation Summary as of 5/29/2018     INR goal 2.0-3.0   Today's INR 1.57!   Warfarin maintenance plan 2.5 mg (5 mg x 0.5) on Fri; 5 mg (5 mg x 1) all other days   Full warfarin instructions 5/29: 7.5 mg; 6/1: 5 mg; Otherwise 2.5 mg on Fri; 5 mg all other days   Weekly warfarin total 32.5 mg   Plan last modified Vicki Hearn RN (5/18/2018)   Next INR check 6/5/2018   Priority INR   Target end date Indefinite    Indications   Atrial fibrillation (H) [I48.91] [I48.91]  Long-term (current) use of anticoagulants [Z79.01] [Z79.01]         Anticoagulation Episode Summary     INR check location     Preferred lab     Send INR reminders to [facility]    Comments HIPPA Infor returned. OK to leave message on cell [phone]. OK to leave messages with Seferino Manning [phone]  labs @ [facility] faxed there 2/16.  fax: [phone]  Goes by Hima      Anticoagulation Care Providers     Provider Role Specialty Phone number    Clarisse Valdes MD Responsible Cardiology [phone]            See the Encounter Report to view Anticoagulation Flowsheet and Dosing Calendar (Go to Encounters tab in chart review, and find the Anticoagulation Therapy Visit)  Spoke with patient.  His schedule did not allow coming in earlier for an INR.    Chelsea Bland RN

## 2018-06-04 DIAGNOSIS — I50.22 CHRONIC SYSTOLIC HEART FAILURE (H): ICD-10-CM

## 2018-06-04 RX ORDER — FUROSEMIDE 20 MG
60 TABLET ORAL 2 TIMES DAILY
Qty: 180 TABLET | Refills: 3 | Status: ON HOLD | OUTPATIENT
Start: 2018-06-04 | End: 2018-09-24

## 2018-06-05 ENCOUNTER — ANTICOAGULATION THERAPY VISIT (OUTPATIENT)
Dept: ANTICOAGULATION | Facility: CLINIC | Age: 78
End: 2018-06-05

## 2018-06-05 DIAGNOSIS — I48.20 CHRONIC ATRIAL FIBRILLATION (H): ICD-10-CM

## 2018-06-05 DIAGNOSIS — Z79.01 LONG-TERM (CURRENT) USE OF ANTICOAGULANTS: ICD-10-CM

## 2018-06-05 LAB — INR BLD: 1.5 (ref 0.86–1.14)

## 2018-06-05 PROCEDURE — 85610 PROTHROMBIN TIME: CPT | Mod: QW | Performed by: INTERNAL MEDICINE

## 2018-06-05 PROCEDURE — 36416 COLLJ CAPILLARY BLOOD SPEC: CPT | Performed by: INTERNAL MEDICINE

## 2018-06-05 NOTE — MR AVS SNAPSHOT
Tad Tim Manning   6/5/2018   Anticoagulation Therapy Visit    Description:  77 year old male   Provider:  Darien Ballard RN   Department:  [facility]           INR as of 6/5/2018     Today's INR 1.5!      Anticoagulation Summary as of 6/5/2018     INR goal 2.0-3.0   Today's INR 1.5!   Full warfarin instructions 6/5: 7.5 mg; 6/6: 5 mg; 6/7: 5 mg; 6/8: 7.5 mg; 6/9: 5 mg; 6/10: 5 mg; 6/11: 5 mg   Next INR check 6/12/2018    Indications   Atrial fibrillation (H) [I48.91] [I48.91]  Long-term (current) use of anticoagulants [Z79.01] [Z79.01]         June 2018 Details    Sun Mon Tue Wed Thu Fri Sat          1               2                 3               4               5      7.5 mg   See details      6      5 mg         7      5 mg         8      7.5 mg         9      5 mg           10      5 mg         11      5 mg         12            13               14               15               16                 17               18               19               20               21               22               23                 24               25               26               27               28               29               30                Date Details   06/05 This INR check       Date of next INR:  6/12/2018         How to take your warfarin dose     To take:  5 mg Take 1 of the 5 mg tablets.    To take:  7.5 mg Take 1.5 of the 5 mg tablets.

## 2018-06-05 NOTE — PROGRESS NOTES
ANTICOAGULATION FOLLOW-UP CLINIC VISIT    Patient Name:  Tad Manning  Date:  6/5/2018  Contact Type:  Telephone    SUBJECTIVE:     Patient Findings     Positives No Problem Findings, Unexplained INR or factor level change (Subtherapeutic INR result is 1.5 today.)    Comments Spoke to patient.  No missed doses.  Patient agreed to check an INR in one week.  Discussed patient taking 7.5mg of Coumadin twice a week, he agreed that he has needed more Coumadin recently.  Several INR results have been below his goal range.  May need to adjust Coumadin dose to continue to get Tad into his goal range.           OBJECTIVE    INR Point of Care   Date Value Ref Range Status   06/05/2018 1.5 (H) 0.86 - 1.14 Final     Comment:     This test is intended for monitoring Coumadin therapy.  Results are not   accurate in patients with prolonged INR due to factor deficiency.         ASSESSMENT / PLAN  INR assessment SUB    Recheck INR In: 1 WEEK    INR Location Clinic      Anticoagulation Summary as of 6/5/2018     INR goal 2.0-3.0   Today's INR 1.5!   Warfarin maintenance plan No maintenance plan   Full warfarin instructions 6/5: 7.5 mg; 6/6: 5 mg; 6/7: 5 mg; 6/8: 7.5 mg; 6/9: 5 mg; 6/10: 5 mg; 6/11: 5 mg   Weekly warfarin total 32.5 mg   Plan last modified Darien Ballard RN (6/5/2018)   Next INR check 6/12/2018   Priority INR   Target end date Indefinite    Indications   Atrial fibrillation (H) [I48.91] [I48.91]  Long-term (current) use of anticoagulants [Z79.01] [Z79.01]         Anticoagulation Episode Summary     INR check location     Preferred lab     Send INR reminders to [facility]    Comments HIPPA Infor returned. OK to leave message on cell [phone]. OK to leave messages with Seferino Manning [phone]  labs @ [facility] faxed there 2/16.  fax: [phone]  Goes by Hima      Anticoagulation Care Providers     Provider Role Specialty Phone number    Clarisse Valdes MD Responsible  Cardiology [phone]            See the Encounter Report to view Anticoagulation Flowsheet and Dosing Calendar (Go to Encounters tab in chart review, and find the Anticoagulation Therapy Visit)    Spoke with patient. Gave them their lab results and new warfarin recommendation.  No changes in health, medication, or diet. No missed doses, no falls. No signs or symptoms of bleed or clotting.    Darien Ballard, RN

## 2018-06-07 RX ORDER — FUROSEMIDE 20 MG
60 TABLET ORAL 2 TIMES DAILY
Qty: 180 TABLET | Refills: 3 | OUTPATIENT
Start: 2018-06-07

## 2018-06-12 ENCOUNTER — ANTICOAGULATION THERAPY VISIT (OUTPATIENT)
Dept: ANTICOAGULATION | Facility: CLINIC | Age: 78
End: 2018-06-12

## 2018-06-12 DIAGNOSIS — I48.20 CHRONIC ATRIAL FIBRILLATION (H): ICD-10-CM

## 2018-06-12 DIAGNOSIS — Z79.01 LONG-TERM (CURRENT) USE OF ANTICOAGULANTS: ICD-10-CM

## 2018-06-12 LAB — INR BLD: 1.4 (ref 0.86–1.14)

## 2018-06-12 PROCEDURE — 36416 COLLJ CAPILLARY BLOOD SPEC: CPT | Performed by: INTERNAL MEDICINE

## 2018-06-12 PROCEDURE — 85610 PROTHROMBIN TIME: CPT | Mod: QW | Performed by: INTERNAL MEDICINE

## 2018-06-12 NOTE — PROGRESS NOTES
ANTICOAGULATION FOLLOW-UP CLINIC VISIT    Patient Name:  Tad Manning  Date:  6/12/2018  Contact Type:  Telephone    SUBJECTIVE:     Patient Findings     Positives No Problem Findings, Unexplained INR or factor level change (Subtherapeutic INR result is 1.4 today.)    Comments Spoke to Hima.  He reports eating two salads this past week.  Recommended a change in maintenance dosing.  Instructed him to take 7.5mg on T, Thurs, and Sat., 5mg all other days of the week.  Updated Anticoagulation tracking flowsheet.  Unexplained subtherapeutic INR result.           OBJECTIVE    INR Point of Care   Date Value Ref Range Status   06/12/2018 1.4 (H) 0.86 - 1.14 Final     Comment:     This test is intended for monitoring Coumadin therapy.  Results are not   accurate in patients with prolonged INR due to factor deficiency.         ASSESSMENT / PLAN  INR assessment SUB    Recheck INR In: 1 WEEK    INR Location Clinic      Anticoagulation Summary as of 6/12/2018     INR goal 2.0-3.0   Today's INR 1.4!   Warfarin maintenance plan No maintenance plan   Full warfarin instructions 6/12: 7.5 mg; 6/13: 5 mg; 6/14: 7.5 mg; 6/15: 5 mg; 6/16: 7.5 mg; 6/17: 5 mg; 6/18: 5 mg   Weekly warfarin total 32.5 mg   Plan last modified Darien Ballard RN (6/5/2018)   Next INR check 6/19/2018   Priority INR   Target end date Indefinite    Indications   Atrial fibrillation (H) [I48.91] [I48.91]  Long-term (current) use of anticoagulants [Z79.01] [Z79.01]         Anticoagulation Episode Summary     INR check location     Preferred lab     Send INR reminders to [facility]    Comments HIPPA Infor returned. OK to leave message on cell [phone]. OK to leave messages with Seferino Manning [phone]  labs @ [facility] [place] [place] faxed there 2/16.  fax: [phone]  Goes by Hima      Anticoagulation Care Providers     Provider Role Specialty Phone number    Clarisse Valdes MD Responsible Cardiology [phone]            See the  Encounter Report to view Anticoagulation Flowsheet and Dosing Calendar (Go to Encounters tab in chart review, and find the Anticoagulation Therapy Visit)    Spoke with patient. Reviewed color and size of tablets.  Patient reports peach colored Warfarin.  Gave them their lab results and new warfarin recommendation.  No changes in health, medication, or diet. No missed doses, no falls. No signs or symptoms of bleed or clotting.    Darien Ballard, RN

## 2018-06-12 NOTE — MR AVS SNAPSHOT
Tad Tim Manning   6/12/2018   Anticoagulation Therapy Visit    Description:  77 year old male   Provider:  Darien Ballard RN   Department:  [facility]           INR as of 6/12/2018     Today's INR 1.4!      Anticoagulation Summary as of 6/12/2018     INR goal 2.0-3.0   Today's INR 1.4!   Full warfarin instructions 6/12: 7.5 mg; 6/13: 5 mg; 6/14: 7.5 mg; 6/15: 5 mg; 6/16: 7.5 mg; 6/17: 5 mg; 6/18: 5 mg   Next INR check 6/19/2018    Indications   Atrial fibrillation (H) [I48.91] [I48.91]  Long-term (current) use of anticoagulants [Z79.01] [Z79.01]         June 2018 Details    Sun Mon Tue Wed Thu Fri Sat          1               2                 3               4               5               6               7               8               9                 10               11               12      7.5 mg   See details      13      5 mg         14      7.5 mg         15      5 mg         16      7.5 mg           17      5 mg         18      5 mg         19            20               21               22               23                 24               25               26               27               28               29               30                Date Details   06/12 This INR check       Date of next INR:  6/19/2018         How to take your warfarin dose     To take:  5 mg Take 1 of the 5 mg tablets.    To take:  7.5 mg Take 1.5 of the 5 mg tablets.

## 2018-06-15 ENCOUNTER — DOCUMENTATION ONLY (OUTPATIENT)
Dept: LAB | Facility: CLINIC | Age: 78
End: 2018-06-15

## 2018-06-15 DIAGNOSIS — I50.22 CHRONIC SYSTOLIC HEART FAILURE (H): Primary | ICD-10-CM

## 2018-06-16 ENCOUNTER — PRE VISIT (OUTPATIENT)
Dept: CARDIOLOGY | Facility: CLINIC | Age: 78
End: 2018-06-16

## 2018-06-16 DIAGNOSIS — I50.22 CHRONIC SYSTOLIC HEART FAILURE (H): Primary | ICD-10-CM

## 2018-06-19 ENCOUNTER — ANTICOAGULATION THERAPY VISIT (OUTPATIENT)
Dept: ANTICOAGULATION | Facility: CLINIC | Age: 78
End: 2018-06-19

## 2018-06-19 DIAGNOSIS — I48.20 CHRONIC ATRIAL FIBRILLATION (H): ICD-10-CM

## 2018-06-19 DIAGNOSIS — I50.22 CHRONIC SYSTOLIC HEART FAILURE (H): ICD-10-CM

## 2018-06-19 DIAGNOSIS — Z79.01 LONG-TERM (CURRENT) USE OF ANTICOAGULANTS: ICD-10-CM

## 2018-06-19 LAB
ANION GAP SERPL CALCULATED.3IONS-SCNC: 6 MMOL/L (ref 3–14)
BUN SERPL-MCNC: 56 MG/DL (ref 7–30)
CALCIUM SERPL-MCNC: 9.5 MG/DL (ref 8.5–10.1)
CHLORIDE SERPL-SCNC: 101 MMOL/L (ref 94–109)
CO2 SERPL-SCNC: 32 MMOL/L (ref 20–32)
CREAT SERPL-MCNC: 1.4 MG/DL (ref 0.66–1.25)
GFR SERPL CREATININE-BSD FRML MDRD: 49 ML/MIN/1.7M2
GLUCOSE SERPL-MCNC: 175 MG/DL (ref 70–99)
INR PPP: 1.37 (ref 0.86–1.14)
POTASSIUM SERPL-SCNC: 4.8 MMOL/L (ref 3.4–5.3)
SODIUM SERPL-SCNC: 139 MMOL/L (ref 133–144)

## 2018-06-19 PROCEDURE — 80048 BASIC METABOLIC PNL TOTAL CA: CPT | Performed by: INTERNAL MEDICINE

## 2018-06-19 PROCEDURE — 85610 PROTHROMBIN TIME: CPT | Performed by: INTERNAL MEDICINE

## 2018-06-19 PROCEDURE — 36415 COLL VENOUS BLD VENIPUNCTURE: CPT | Performed by: INTERNAL MEDICINE

## 2018-06-19 NOTE — PROGRESS NOTES
ANTICOAGULATION FOLLOW-UP CLINIC VISIT    Patient Name:  Tad Manning  Date:  6/19/2018  Contact Type:  Telephone    SUBJECTIVE:     Patient Findings     Positives Unexplained INR or factor level change           OBJECTIVE    INR   Date Value Ref Range Status   06/19/2018 1.37 (H) 0.86 - 1.14 Final       ASSESSMENT / PLAN  No question data found.  Anticoagulation Summary as of 6/19/2018     INR goal 2.0-3.0   Today's INR 1.37!   Warfarin maintenance plan No maintenance plan   Full warfarin instructions 6/19: 10 mg; 6/20: 7.5 mg; 6/21: 7.5 mg; 6/22: 5 mg; 6/23: 7.5 mg; 6/24: 7.5 mg; 6/25: 5 mg   Weekly warfarin total 32.5 mg   Plan last modified Darien Ballard RN (6/5/2018)   Next INR check 6/26/2018   Priority INR   Target end date Indefinite    Indications   Atrial fibrillation (H) [I48.91] [I48.91]  Long-term (current) use of anticoagulants [Z79.01] [Z79.01]         Anticoagulation Episode Summary     INR check location     Preferred lab     Send INR reminders to [facility]    Comments HIPPA Infor returned. OK to leave message on cell [phone]. OK to leave messages with Seferino Manning [phone]  labs @ [facility] faxed there 2/16.  fax: [phone]  Goes by Hima      Anticoagulation Care Providers     Provider Role Specialty Phone number    Clarisse Valdes MD Responsible Cardiology [phone]            See the Encounter Report to view Anticoagulation Flowsheet and Dosing Calendar (Go to Encounters tab in chart review, and find the Anticoagulation Therapy Visit)    Spoke with patient.     Vicki Hearn RN

## 2018-06-19 NOTE — MR AVS SNAPSHOT
Tad Tim Manning   6/19/2018   Anticoagulation Therapy Visit    Description:  77 year old male   Provider:  Vicki Hearn, RN   Department:  [facility]           INR as of 6/19/2018     Today's INR 1.37!      Anticoagulation Summary as of 6/19/2018     INR goal 2.0-3.0   Today's INR 1.37!   Full warfarin instructions 6/19: 10 mg; 6/20: 7.5 mg; 6/21: 7.5 mg; 6/22: 5 mg; 6/23: 7.5 mg; 6/24: 7.5 mg; 6/25: 5 mg   Next INR check 6/26/2018    Indications   Atrial fibrillation (H) [I48.91] [I48.91]  Long-term (current) use of anticoagulants [Z79.01] [Z79.01]         June 2018 Details    Sun Mon Tue Wed Thu Fri Sat          1               2                 3               4               5               6               7               8               9                 10               11               12               13               14               15               16                 17               18               19      10 mg   See details      20      7.5 mg         21      7.5 mg         22      5 mg         23      7.5 mg           24      7.5 mg         25      5 mg         26            27               28               29               30                Date Details   06/19 This INR check       Date of next INR:  6/26/2018         How to take your warfarin dose     To take:  5 mg Take 1 of the 5 mg tablets.    To take:  7.5 mg Take 1.5 of the 5 mg tablets.    To take:  10 mg Take 2 of the 5 mg tablets.

## 2018-06-20 ENCOUNTER — OFFICE VISIT (OUTPATIENT)
Dept: CARDIOLOGY | Facility: CLINIC | Age: 78
End: 2018-06-20
Attending: PHYSICIAN ASSISTANT
Payer: MEDICARE

## 2018-06-20 VITALS
HEIGHT: 72 IN | HEART RATE: 67 BPM | BODY MASS INDEX: 24.92 KG/M2 | WEIGHT: 184 LBS | SYSTOLIC BLOOD PRESSURE: 107 MMHG | DIASTOLIC BLOOD PRESSURE: 70 MMHG | OXYGEN SATURATION: 99 %

## 2018-06-20 DIAGNOSIS — I48.20 CHRONIC ATRIAL FIBRILLATION (H): ICD-10-CM

## 2018-06-20 DIAGNOSIS — I25.10 CAD S/P PERCUTANEOUS CORONARY ANGIOPLASTY: ICD-10-CM

## 2018-06-20 DIAGNOSIS — I50.22 CHRONIC SYSTOLIC HEART FAILURE (H): Primary | ICD-10-CM

## 2018-06-20 DIAGNOSIS — Z98.61 CAD S/P PERCUTANEOUS CORONARY ANGIOPLASTY: ICD-10-CM

## 2018-06-20 PROCEDURE — 99214 OFFICE O/P EST MOD 30 MIN: CPT | Mod: ZP | Performed by: PHYSICIAN ASSISTANT

## 2018-06-20 PROCEDURE — G0463 HOSPITAL OUTPT CLINIC VISIT: HCPCS | Mod: ZF

## 2018-06-20 RX ORDER — SPIRONOLACTONE 25 MG/1
12.5 TABLET ORAL DAILY
Qty: 45 TABLET | Refills: 3 | Status: ON HOLD | COMMUNITY
Start: 2018-06-20 | End: 2018-09-24

## 2018-06-20 ASSESSMENT — PAIN SCALES - GENERAL: PAINLEVEL: NO PAIN (0)

## 2018-06-20 NOTE — NURSING NOTE
Chief Complaint   Patient presents with     Follow Up For     Return CORE; 77yr old male wih a h/o CAD, Aflutter and chronic systolic HF secondary to ICM presenting for HF follow-up with labs prior     Vitals were performed, medications were reconciled. Elayne Martinez MA

## 2018-06-20 NOTE — PATIENT INSTRUCTIONS
"You were seen today in the Cardiovascular Clinic at the [facility].     Cardiology Providers you saw during your visit: DICK Daly     Follow up and medication changes:  1. Make sure your spironolactone is only at 12.5mg (1/2 tablet) daily  2. Continue your other medications as you are  3. We will call you within a week to reassess    Results for PETER BUTLER (MRN [id number]) as of 2018 11:23   Ref. Range 2018 10:01   Sodium Latest Ref Range: 133 - 144 mmol/L 139   Potassium Latest Ref Range: 3.4 - 5.3 mmol/L 4.8   Chloride Latest Ref Range: 94 - 109 mmol/L 101   Carbon Dioxide Latest Ref Range: 20 - 32 mmol/L 32   Urea Nitrogen Latest Ref Range: 7 - 30 mg/dL 56 (H)   Creatinine Latest Ref Range: 0.66 - 1.25 mg/dL 1.40 (H)   GFR Estimate Latest Ref Range: >60 mL/min/1.7m2 49 (L)   GFR Estimate If Black Latest Ref Range: >60 mL/min/1.7m2 60 (L)   Calcium Latest Ref Range: 8.5 - 10.1 mg/dL 9.5   Anion Gap Latest Ref Range: 3 - 14 mmol/L 6   Glucose Latest Ref Range: 70 - 99 mg/dL 175 (H)   INR Latest Ref Range: 0.86 - 1.14  1.37 (H)         Please limit your fluid intake to 2 L (68 ounces) daily.  2 Liters a day = 8.5 cups, or 68 ounces.  Please limit your salt intake to 2 grams a day or less.     If you gain 2# in 24 hours or 5# in one week call Verónica Jarquin RN so we can adjust your medications as needed over the phone.     Please feel free to call me with any questions or concerns.       Verónica Jarquin RN  [facility]  Cardiology Care Coordinator-Heart Failure Clinic     Questions and [phone].   First press #1 for the [employer] and then press #3 for \"Medical Questions\" to reach us Cardiology Nurses.      On Call Cardiologist for after hours or on weekends: [phone]   option #4 and ask to speak to the on-call Cardiologist. Inform them you are a CORE/heart failure patient at the [place].           If you need a medication " refill please contact your pharmacy.  Please allow 3 business days for your refill to be completed.  _______________________________________________________  C.O.R.E. CLINIC Cardiomyopathy, Optimization, Rehabilitation, Education   The C.O.R.E. CLINIC is a heart failure specialty clinic within the [facility] where you will work with specialized nurse practitioners dedicated to helping patients with heart failure carefully adjust medications, receive education, and learn who and when to call if symptoms develop. They specialize in helping you better understand your condition, slow the progression of your disease, improve the length and quality of your life, help you detect future heart problems before they become life threatening, and avoid hospitalizations.  As always, thank you for trusting us with your health care needs!

## 2018-06-20 NOTE — MR AVS SNAPSHOT
After Visit Summary   6/20/2018    Peter Manning    MRN: [id number]           Patient Information     Date Of Birth          1940        Visit Information        Provider Department      6/20/2018 11:45 AM Rasheeda Stephen PA-C [facility]        Today's Diagnoses     Chronic systolic heart failure (H)        Chronic systolic congestive heart failure (H)          Care Instructions    You were seen today in the Cardiovascular Clinic at the [facility].     Cardiology Providers you saw during your visit: DICK Daly     Follow up and medication changes:  1. Make sure your spironolactone is only at 12.5mg (1/2 tablet) daily  2. Continue your other medications as you are  3. We will call you within a week to reassess    Results for PETER MANNING (MRN [id number]) as of 6/20/2018 11:23   Ref. Range 6/19/2018 10:01   Sodium Latest Ref Range: 133 - 144 mmol/L 139   Potassium Latest Ref Range: 3.4 - 5.3 mmol/L 4.8   Chloride Latest Ref Range: 94 - 109 mmol/L 101   Carbon Dioxide Latest Ref Range: 20 - 32 mmol/L 32   Urea Nitrogen Latest Ref Range: 7 - 30 mg/dL 56 (H)   Creatinine Latest Ref Range: 0.66 - 1.25 mg/dL 1.40 (H)   GFR Estimate Latest Ref Range: >60 mL/min/1.7m2 49 (L)   GFR Estimate If Black Latest Ref Range: >60 mL/min/1.7m2 60 (L)   Calcium Latest Ref Range: 8.5 - 10.1 mg/dL 9.5   Anion Gap Latest Ref Range: 3 - 14 mmol/L 6   Glucose Latest Ref Range: 70 - 99 mg/dL 175 (H)   INR Latest Ref Range: 0.86 - 1.14  1.37 (H)         Please limit your fluid intake to 2 L (68 ounces) daily.  2 Liters a day = 8.5 cups, or 68 ounces.  Please limit your salt intake to 2 grams a day or less.     If you gain 2# in 24 hours or 5# in one week call Verónica Jarquin RN so we can adjust your medications as needed over the phone.     Please feel free to call me with any questions or concerns.       Verónica Jarquin RN  [facility]  "[facility]  Cardiology Care Coordinator-Heart Failure Clinic     Questions and [phone].   First press #1 for the [employer] and then press #3 for \"Medical Questions\" to reach us Cardiology Nurses.      On Call Cardiologist for after hours or on weekends: [phone]   option #4 and ask to speak to the on-call Cardiologist. Inform them you are a CORE/heart failure patient at the [place].           If you need a medication refill please contact your pharmacy.  Please allow 3 business days for your refill to be completed.  _______________________________________________________  C.O.R.E. CLINIC Cardiomyopathy, Optimization, Rehabilitation, Education   The C.O.R.E. CLINIC is a heart failure specialty clinic within the [facility] where you will work with specialized nurse practitioners dedicated to helping patients with heart failure carefully adjust medications, receive education, and learn who and when to call if symptoms develop. They specialize in helping you better understand your condition, slow the progression of your disease, improve the length and quality of your life, help you detect future heart problems before they become life threatening, and avoid hospitalizations.  As always, thank you for trusting us with your health care needs!             Follow-ups after your visit        Your next 10 appointments already scheduled     2018 10:00 AM CDT   LAB with LAB FIRST FLOOR [facility] ([facility])    [address] [place] [id number] [phone]           Please do not eat 10-12 hours before your appointment if you are coming in fasting for labs on lipids, cholesterol, or glucose (sugar). This does not apply to pregnant women. Water, hot tea and black coffee (with nothing added) are okay. Do not drink other fluids, diet soda or chew gum.            2018 11:00 AM CDT   (Arrive by 10:45 " AM)   Return Visit with Clarisse Valdes MD   [facility] ([facility])    [address] [facility]  [address] [place] [address] [phone]              Who to contact     If you have questions or need follow up information about today's clinic visit or your schedule please contact [facility] directly at [phone].  Normal or non-critical lab and imaging results will be communicated to you by MyChart, letter or phone within 4 business days after the clinic has received the results. If you do not hear from us within 7 days, please contact the clinic through [facility] or phone. If you have a critical or abnormal lab result, we will notify you by phone as soon as possible.  Submit refill requests through [facility] or call your pharmacy and they will forward the refill request to us. Please allow 3 business days for your refill to be completed.          Additional Information About Your Visit        [facility] Information     [facility] gives you secure access to your electronic health record. If you see a primary care provider, you can also send messages to your care team and make appointments. If you have questions, please call your primary care clinic.  If you do not have a primary care provider, please call [phone] and they will assist you.        Care EveryWhere ID     This is your Care EveryWhere ID. This could be used by other organizations to access your [place] medical records  [id number]        Your Vitals Were     Pulse Height Pulse Oximetry BMI (Body Mass Index)          67 1.829 m (6') 99% 24.95 kg/m2         Blood Pressure from Last 3 Encounters:   06/20/18 107/70   05/18/18 116/75   05/02/18 108/70    Weight from Last 3 Encounters:   06/20/18 83.5 kg (184 lb)   05/18/18 89.8 kg (198 lb)   05/02/18 90.6 kg (199 lb 11.2 oz)              We Performed the Following     Follow-Up with [facility]          Today's Medication Changes          These changes are  accurate as of 6/20/18 12:10 PM.  If you have any questions, ask your nurse or doctor.               These medicines have changed or have updated prescriptions.        Dose/Directions    spironolactone 25 MG tablet   Commonly known as:  ALDACTONE   This may have changed:  how much to take   Used for:  Chronic systolic congestive heart failure (H)   Changed by:  Rasheeda Stephen PA-C        Dose:  12.5 mg   Take 0.5 tablets (12.5 mg) by mouth daily   Quantity:  45 tablet   Refills:  3                Primary Care Provider Office Phone # Fax #    Gene Guevara [phone]       [place] [address]        Equal Access to Services     STUART ELLISON : Yumiko Urena, waabbey agudelo, qaybta kaalmada raymundo, deandre del cid. So [facility] [phone].    ATENCIÓN: Si habla español, tiene a noe disposición servicios gratuitos de asistencia lingüística. [facility] [phone].    We comply with applicable federal civil rights laws and [place] laws. We do not discriminate on the basis of race, color, national origin, age, disability, sex, sexual orientation, or gender identity.            Thank you!     Thank you for choosing [facility]  for your care. Our goal is always to provide you with excellent care. Hearing back from our patients is one way we can continue to improve our services. Please take a few minutes to complete the written survey that you may receive in the mail after your visit with us. Thank you!             Your Updated Medication List - Protect others around you: Learn how to safely use, store and throw away your medicines at www.disposemymeds.org.          This list is accurate as of 6/20/18 12:10 PM.  Always use your most recent med list.                   Brand Name Dispense Instructions for use Diagnosis    acetaminophen 325 MG tablet    TYLENOL     Take 325 mg by mouth every 4 hours as  needed for mild pain        ALPRAZolam 0.25 MG tablet    XANAX     TK 1 T PO QHS PRN    Epidural abscess       atorvastatin 20 MG tablet    LIPITOR    90 tablet    TAKE 1 TABLET BY MOUTH EVERY DAY    Claudication of left lower extremity (H)       CELEXA PO      Take 10 mg by mouth daily        cholecalciferol 1000 units Tabs     30 tablet    Take 1,000 Units by mouth daily    Loop diuretic causing adverse effect in therapeutic use, subsequent encounter       clopidogrel 75 MG tablet    PLAVIX    90 tablet    Take 1 tablet (75 mg) by mouth daily    Status post percutaneous transluminal coronary angioplasty, Coronary artery disease involving native coronary artery of native heart without angina pectoris       furosemide 20 MG tablet    LASIX    180 tablet    Take 3 tablets (60 mg) by mouth 2 times daily    Chronic systolic heart failure (H)       lisinopril 10 MG tablet    PRINIVIL/ZESTRIL    30 tablet    Take 1 tablet (10 mg) by mouth daily        MELATONIN PO      Take 5 mg by mouth daily        metFORMIN 500 MG tablet    GLUCOPHAGE    60 tablet    Take 1 tablet (500 mg) by mouth 2 times daily (with meals)    Type 2 diabetes mellitus with diabetic polyneuropathy, without long-term current use of insulin (H)       metoprolol succinate 200 MG 24 hr tablet    TOPROL-XL    90 tablet    Take 0.5 tablets (100 mg) by mouth daily    Chronic systolic heart failure (H)       nitroGLYcerin 0.4 MG sublingual tablet    NITROSTAT    25 tablet    For chest pain place 1 tablet under the tongue every 5 minutes for 3 doses. If symptoms persist 5 minutes after 1st dose call 911.    Atrial flutter, unspecified type (H)       nystatin cream    MYCOSTATIN     Apply topically 2 times daily        order for DME     1 Device    Equipment being ordered: [facility] shoe    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), Diabetic ulcer of left foot due to type 2 diabetes mellitus (H)       ranitidine 150 MG tablet    ZANTAC    60 tablet    Take  1 tablet (150 mg) by mouth 2 times daily    Gastroesophageal reflux disease without esophagitis       sodium chloride 0.65 % nasal spray    EQ SALINE NASAL SPRAY    1 Bottle    Spray 1 spray into both nostrils daily as needed for congestion        spironolactone 25 MG tablet    ALDACTONE    45 tablet    Take 0.5 tablets (12.5 mg) by mouth daily    Chronic systolic congestive heart failure (H)       tamsulosin 0.4 MG capsule    FLOMAX    60 capsule    Take 2 capsules (0.8 mg) by mouth At Bedtime    Renal insufficiency       warfarin 5 MG tablet    COUMADIN    90 tablet    Take 1 tablet (5 mg) by mouth daily Dose adjusted per [facility]madin Clinic.    Paroxysmal atrial fibrillation (H)

## 2018-06-20 NOTE — LETTER
6/20/2018      RE: Tad Manning  [address] [address] [address]       Dear Colleague,    Thank you for the opportunity to participate in the care of your patient, Tad Manning, at the [facility]. Please see a copy of my visit note below.    CARDIOLOGY CLINIC    HPI:   Tad Manning is a 77 year old male with CLL in remission, CAD s/p 3 vessel CABG, ischemic cardiomyopathy, systolic heart failure with EF 20%, atrial flutter, PAD s/p ilial artery stents and LLE bypass who presents to CORE clinic for follow up.     I met him in early May at which time he was volume up and we increased his Lasix to which he responded some. In follow up 2 weeks ago we again increased his Lasix, decreased his BB.      Since our last visit he is doing well. He has much more energy and is keeping better pace. He did a 2 mile walk the other day in about 45 minutes. He was fatigue at the end, no dyspnea or chest pain and limited more by his knees. No cough, lightheadedness, near syncope, abdominal bloating. His LE swelling is much improved. He still sleeps in his recliner without issue.     His weight is notably down today. I tried to ascertain whether this has been a gradual decline since our last visit. It seems he maybe checks them but doesn't necessarily keep a log.     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)      Coronary artery disease     triple bypass 1995     Diabetes mellitus (H)      Diabetic polyneuropathy (H)      History of blood transfusion      Hyperlipidaemia      Hypertension      Noninflammatory pericardial effusion      Osteomyelitis of left foot (H)      PVD (peripheral  vascular disease) (H)      Sebaceous cyst      SOCIAL HISTORY:  Social History     Social History     Marital status: Single     Spouse name: N/A     Number of children: N/A     Years of education: N/A     Social History Main Topics     Smoking status: Former Smoker     Packs/day: 2.00     Years: 30.00     Types: Cigarettes     Quit date: 6/19/1995     Smokeless tobacco: Never Used     Alcohol use Yes      Comment: 1 drink monthly     Drug use: No     Sexual activity: Not Asked     Other Topics Concern     None     Social History Narrative    Lives independently with SO. 2/9/2017      CURRENT MEDICATIONS:  Outpatient Medications Prior to Visit   Medication Sig Dispense Refill     acetaminophen (TYLENOL) 325 MG tablet Take 325 mg by mouth every 4 hours as needed for mild pain       ALPRAZolam (XANAX) 0.25 MG tablet TK 1 T PO QHS PRN  1     atorvastatin (LIPITOR) 20 MG tablet TAKE 1 TABLET BY MOUTH EVERY DAY 90 tablet 3     cholecalciferol 1000 UNITS TABS Take 1,000 Units by mouth daily 30 tablet      Citalopram Hydrobromide (CELEXA PO) Take 10 mg by mouth daily       clopidogrel (PLAVIX) 75 MG tablet Take 1 tablet (75 mg) by mouth daily 90 tablet 3     furosemide (LASIX) 20 MG tablet Take 3 tablets (60 mg) by mouth 2 times daily 180 tablet 3     lisinopril (PRINIVIL/ZESTRIL) 10 MG tablet Take 1 tablet (10 mg) by mouth daily 30 tablet      MELATONIN PO Take 5 mg by mouth daily       metFORMIN (GLUCOPHAGE) 500 MG tablet Take 1 tablet (500 mg) by mouth 2 times daily (with meals) 60 tablet      metoprolol succinate (TOPROL-XL) 200 MG 24 hr tablet Take 0.5 tablets (100 mg) by mouth daily 90 tablet 3     nitroglycerin (NITROSTAT) 0.4 MG sublingual tablet For chest pain place 1 tablet under the tongue every 5 minutes for 3 doses. If symptoms persist 5 minutes after 1st dose call 911. 25 tablet      nystatin (MYCOSTATIN) cream Apply topically 2 times daily       order for DME Equipment being ordered: [employer] shoe 1 Device 0      ranitidine (ZANTAC) 150 MG tablet Take 1 tablet (150 mg) by mouth 2 times daily 60 tablet      sodium chloride (EQ SALINE NASAL SPRAY) 0.65 % nasal spray Spray 1 spray into both nostrils daily as needed for congestion 1 Bottle 1     spironolactone (ALDACTONE) 25 MG tablet Take 1 tablet (25 mg) by mouth daily 45 tablet 3     tamsulosin (FLOMAX) 0.4 MG capsule Take 2 capsules (0.8 mg) by mouth At Bedtime 60 capsule      warfarin (COUMADIN) 5 MG tablet Take 1 tablet (5 mg) by mouth daily Dose adjusted per Coumadin Clinic. 90 tablet 3     No facility-administered medications prior to visit.      ROS:  No fever or chills. Appetite much improved.   Sleeping better.  NO vision changes or epistaxis.  No cough or hemoptysis  CV as above  No vomiting, melena or hematochezia  +urinary frequency, no dysuria  No rash  No focal weakness, headaches or syncope  No increased thirst, heat/cold intolerance    EXAM:  /70 (BP Location: Left arm, Patient Position: Chair, Cuff Size: Adult Regular)  Pulse 67  Ht 1.829 m (6')  Wt 83.5 kg (184 lb)  SpO2 99%  BMI 24.95 kg/m2  Home weight: 177lbs  General: seated in chair, in NAD  HEENT: NC/AT, sclera anicteric, MMM  Pulm: CTA B, no wheezing or rales noted  CV: irregular, normal S1/S2, no murmur or gallop appreciated. JVP 10cm  Abd: ND, +BS, soft, NT  Ext: tr LE edema  Neuro: alert, conversant  Psych: pleasant mood and affect    Labs:  CMP RESULTS:  Lab Results   Component Value Date     06/19/2018    POTASSIUM 4.8 06/19/2018    CHLORIDE 101 06/19/2018    CO2 32 06/19/2018    ANIONGAP 6 06/19/2018     (H) 06/19/2018    BUN 56 (H) 06/19/2018    CR 1.40 (H) 06/19/2018    GFRESTIMATED 49 (L) 06/19/2018    GFRESTBLACK 60 (L) 06/19/2018    JUSTINO 9.5 06/19/2018    BILITOTAL 1.4 (H) 04/09/2018    ALBUMIN 3.4 04/09/2018    ALKPHOS 134 04/09/2018    ALT 33 04/09/2018    AST 17 04/09/2018          Assessment and Plan:   Hima is a 77 year old male with CLL in remission, CAD, PAD, DM  and systolic heart failure who presents to clinic for follow up. His weight has dropped 14 pounds in 1 month. By exam he does not appear dry. His Creat is up to 1.4 which is was 2 weeks after our last visit. His baseline is closer to 1-1.1. There seems to be a little concern about his medications. I had increased his spironolactone in the past and his potassium increased to 5.1 so we backed off. When one of our nurses spoke to him it seemed he was taking the full dose. Today he tells me he's pretty sure he's only take 1/2 tablet though his potassium is 4.8.     1. Chronic systolic heart failure secondary to ischemic cardiomyopathy, Stage C, NYHA class II.    Fluid status- on exam he seems mildly hypervolemic but his weight has dropped tremendously in the prior month. Continue Lasix at 60mg twice daily for now.   ACEi/ARB yes, on lisinopril 10mg daily, no change today due to labs  BB yes, continue on 100mg daily- he's seems to be doing better with this  Aldosterone antagonist- yes, given his K and Creat asked him to assure he is only taking 12.5mg daily (1/2 tablet)  SCD prophylaxis-defer during medication titration, will schedule imaging with next physician visit    2. CAD  -continue Plavix, BB and statin.  -no aspirin secondary to already on dual therapy with warfarin    3. Atrial arrhythmia  -continue warfarin and BB    Follow up: July with Dr. Valdes, Echo prior    Rasheeda Stephen PA-C  [facility]  Pager [phone]      Please do not hesitate to contact me if you have any questions/concerns.     Sincerely,     Rasheeda Stephen PA-C

## 2018-06-20 NOTE — PROGRESS NOTES
CARDIOLOGY CLINIC    HPI:   Tad Manning is a 77 year old male with CLL in remission, CAD s/p 3 vessel CABG, ischemic cardiomyopathy, systolic heart failure with EF 20%, atrial flutter, PAD s/p ilial artery stents and LLE bypass who presents to [facility] for follow up.     I met him in early May at which time he was volume up and we increased his Lasix to which he responded some. In follow up 2 weeks ago we again increased his Lasix, decreased his BB.      Since our last visit he is doing well. He has much more energy and is keeping better pace. He did a 2 mile walk the other day in about 45 minutes. He was fatigue at the end, no dyspnea or chest pain and limited more by his knees. No cough, lightheadedness, near syncope, abdominal bloating. His LE swelling is much improved. He still sleeps in his recliner without issue.     His weight is notably down today. I tried to ascertain whether this has been a gradual decline since our last visit. It seems he maybe checks them but doesn't necessarily keep a log.     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)      Coronary artery disease     triple bypass 1995     Diabetes mellitus (H)      Diabetic polyneuropathy (H)      History of blood transfusion      Hyperlipidaemia      Hypertension      Noninflammatory pericardial effusion      Osteomyelitis of left foot (H)      PVD (peripheral vascular disease) (H)      Sebaceous cyst      SOCIAL HISTORY:  Social History     Social History     Marital status: Single     Spouse name: N/A     Number of children: N/A     Years of education: N/A     Social History Main Topics     Smoking status: Former Smoker     Packs/day: 2.00     Years: 30.00     Types: Cigarettes     Quit  date: 6/19/1995     Smokeless tobacco: Never Used     Alcohol use Yes      Comment: 1 drink monthly     Drug use: No     Sexual activity: Not Asked     Other Topics Concern     None     Social History Narrative    Lives independently with SO. 2/9/2017      CURRENT MEDICATIONS:  Outpatient Medications Prior to Visit   Medication Sig Dispense Refill     acetaminophen (TYLENOL) 325 MG tablet Take 325 mg by mouth every 4 hours as needed for mild pain       ALPRAZolam (XANAX) 0.25 MG tablet TK 1 T PO QHS PRN  1     atorvastatin (LIPITOR) 20 MG tablet TAKE 1 TABLET BY MOUTH EVERY DAY 90 tablet 3     cholecalciferol 1000 UNITS TABS Take 1,000 Units by mouth daily 30 tablet      Citalopram Hydrobromide (CELEXA PO) Take 10 mg by mouth daily       clopidogrel (PLAVIX) 75 MG tablet Take 1 tablet (75 mg) by mouth daily 90 tablet 3     furosemide (LASIX) 20 MG tablet Take 3 tablets (60 mg) by mouth 2 times daily 180 tablet 3     lisinopril (PRINIVIL/ZESTRIL) 10 MG tablet Take 1 tablet (10 mg) by mouth daily 30 tablet      MELATONIN PO Take 5 mg by mouth daily       metFORMIN (GLUCOPHAGE) 500 MG tablet Take 1 tablet (500 mg) by mouth 2 times daily (with meals) 60 tablet      metoprolol succinate (TOPROL-XL) 200 MG 24 hr tablet Take 0.5 tablets (100 mg) by mouth daily 90 tablet 3     nitroglycerin (NITROSTAT) 0.4 MG sublingual tablet For chest pain place 1 tablet under the tongue every 5 minutes for 3 doses. If symptoms persist 5 minutes after 1st dose call 911. 25 tablet      nystatin (MYCOSTATIN) cream Apply topically 2 times daily       order for DME Equipment being ordered: 2 shoe 1 Device 0     ranitidine (ZANTAC) 150 MG tablet Take 1 tablet (150 mg) by mouth 2 times daily 60 tablet      sodium chloride (EQ SALINE NASAL SPRAY) 0.65 % nasal spray Spray 1 spray into both nostrils daily as needed for congestion 1 Bottle 1     spironolactone (ALDACTONE) 25 MG tablet Take 1 tablet (25 mg) by mouth daily 45 tablet 3      tamsulosin (FLOMAX) 0.4 MG capsule Take 2 capsules (0.8 mg) by mouth At Bedtime 60 capsule      warfarin (COUMADIN) 5 MG tablet Take 1 tablet (5 mg) by mouth daily Dose adjusted per [facility]. 90 tablet 3     No facility-administered medications prior to visit.      ROS:  No fever or chills. Appetite much improved.   Sleeping better.  NO vision changes or epistaxis.  No cough or hemoptysis  CV as above  No vomiting, melena or hematochezia  +urinary frequency, no dysuria  No rash  No focal weakness, headaches or syncope  No increased thirst, heat/cold intolerance    EXAM:  /70 (BP Location: Left arm, Patient Position: Chair, Cuff Size: Adult Regular)  Pulse 67  Ht 1.829 m (6')  Wt 83.5 kg (184 lb)  SpO2 99%  BMI 24.95 kg/m2  Home weight: 177lbs  General: seated in chair, in NAD  HEENT: NC/AT, sclera anicteric, MMM  Pulm: CTA B, no wheezing or rales noted  CV: irregular, normal S1/S2, no murmur or gallop appreciated. JVP 10cm  Abd: ND, +BS, soft, NT  Ext: tr LE edema  Neuro: alert, conversant  Psych: pleasant mood and affect    Labs:  CMP RESULTS:  Lab Results   Component Value Date     06/19/2018    POTASSIUM 4.8 06/19/2018    CHLORIDE 101 06/19/2018    CO2 32 06/19/2018    ANIONGAP 6 06/19/2018     (H) 06/19/2018    BUN 56 (H) 06/19/2018    CR 1.40 (H) 06/19/2018    GFRESTIMATED 49 (L) 06/19/2018    GFRESTBLACK 60 (L) 06/19/2018    JUSTINO 9.5 06/19/2018    BILITOTAL 1.4 (H) 04/09/2018    ALBUMIN 3.4 04/09/2018    ALKPHOS 134 04/09/2018    ALT 33 04/09/2018    AST 17 04/09/2018          Assessment and Plan:   Hima is a 77 year old male with CLL in remission, CAD, PAD, DM and systolic heart failure who presents to clinic for follow up. His weight has dropped 14 pounds in 1 month. By exam he does not appear dry. His Creat is up to 1.4 which is was 2 weeks after our last visit. His baseline is closer to 1-1.1. There seems to be a little concern about his medications. I had increased his  spironolactone in the past and his potassium increased to 5.1 so we backed off. When one of our nurses spoke to him it seemed he was taking the full dose. Today he tells me he's pretty sure he's only take 1/2 tablet though his potassium is 4.8.     1. Chronic systolic heart failure secondary to ischemic cardiomyopathy, Stage C, NYHA class II.    Fluid status- on exam he seems mildly hypervolemic but his weight has dropped tremendously in the prior month. Continue Lasix at 60mg twice daily for now.   ACEi/ARB yes, on lisinopril 10mg daily, no change today due to labs  BB yes, continue on 100mg daily- he's seems to be doing better with this  Aldosterone antagonist- yes, given his K and Creat asked him to assure he is only taking 12.5mg daily (1/2 tablet)  SCD prophylaxis-defer during medication titration, will schedule imaging with next physician visit    2. CAD  -continue Plavix, BB and statin.  -no aspirin secondary to already on dual therapy with warfarin    3. Atrial arrhythmia  -continue warfarin and BB    Follow up: July with Dr. Valdes, Echo prior    Rasheeda Stephen PA-C  [facility]  Pager [phone]

## 2018-06-26 ENCOUNTER — ANTICOAGULATION THERAPY VISIT (OUTPATIENT)
Dept: ANTICOAGULATION | Facility: CLINIC | Age: 78
End: 2018-06-26

## 2018-06-26 DIAGNOSIS — Z79.01 LONG-TERM (CURRENT) USE OF ANTICOAGULANTS: ICD-10-CM

## 2018-06-26 DIAGNOSIS — I48.20 CHRONIC ATRIAL FIBRILLATION (H): ICD-10-CM

## 2018-06-26 LAB — INR BLD: 2.5 (ref 0.86–1.14)

## 2018-06-26 PROCEDURE — 85610 PROTHROMBIN TIME: CPT | Mod: QW | Performed by: INTERNAL MEDICINE

## 2018-06-26 PROCEDURE — 36416 COLLJ CAPILLARY BLOOD SPEC: CPT | Performed by: INTERNAL MEDICINE

## 2018-06-26 NOTE — MR AVS SNAPSHOT
Tad Tim Manning   6/26/2018   Anticoagulation Therapy Visit    Description:  77 year old male   Provider:  Yoselin Hunter, RN   Department:  [facility]           INR as of 6/26/2018     Today's INR 2.5      Anticoagulation Summary as of 6/26/2018     INR goal 2.0-3.0   Today's INR 2.5   Full warfarin instructions 6/26: 7.5 mg; 6/27: 5 mg; 6/28: 7.5 mg; 6/29: 5 mg; 6/30: 7.5 mg; 7/1: 7.5 mg; 7/2: 5 mg   Next INR check 7/3/2018    Indications   Atrial fibrillation (H) [I48.91] [I48.91]  Long-term (current) use of anticoagulants [Z79.01] [Z79.01]         June 2018 Details    Sun Mon Tue Wed Thu Fri Sat          1               2                 3               4               5               6               7               8               9                 10               11               12               13               14               15               16                 17               18               19               20               21               22               23                 24               25               26      7.5 mg   See details      27      5 mg         28      7.5 mg         29      5 mg         30      7.5 mg          Date Details   06/26 This INR check               How to take your warfarin dose     To take:  5 mg Take 1 of the 5 mg tablets.    To take:  7.5 mg Take 1.5 of the 5 mg tablets.           July 2018 Details    Sun Mon Tue Wed Thu Fri Sat     1      7.5 mg         2      5 mg         3            4               5               6               7                 8               9               10               11               12               13               14                 15               16               17               18               19               20               21                 22               23               24               25               26               27               28                 29               30               31                     Date Details   No additional details    Date of next INR:  7/3/2018         How to take your warfarin dose     To take:  5 mg Take 1 of the 5 mg tablets.    To take:  7.5 mg Take 1.5 of the 5 mg tablets.

## 2018-06-26 NOTE — PROGRESS NOTES
ANTICOAGULATION FOLLOW-UP CLINIC VISIT    Patient Name:  Tad Manning  Date:  6/26/2018  Contact Type:  Telephone    SUBJECTIVE:     Patient Findings     Comments Hima took a total of 50 mg of warfarin the last 7 days.  His INR went from 1.37 to 2.5.  Will decrease warfarin dose to 5 mg MWF and 7.5 mg TuThSaSu.  Will recheck INR in one week.           OBJECTIVE    INR Point of Care   Date Value Ref Range Status   06/26/2018 2.5 (H) 0.86 - 1.14 Final     Comment:     This test is intended for monitoring Coumadin therapy.  Results are not   accurate in patients with prolonged INR due to factor deficiency.         ASSESSMENT / PLAN  INR assessment THER    Recheck INR In: 1 WEEK    INR Location Clinic      Anticoagulation Summary as of 6/26/2018     INR goal 2.0-3.0   Today's INR 2.5   Warfarin maintenance plan No maintenance plan   Full warfarin instructions 6/26: 7.5 mg; 6/27: 5 mg; 6/28: 7.5 mg; 6/29: 5 mg; 6/30: 7.5 mg; 7/1: 7.5 mg; 7/2: 5 mg   Weekly warfarin total 32.5 mg   Plan last modified Darien Ballard RN (6/5/2018)   Next INR check 7/3/2018   Priority INR   Target end date Indefinite    Indications   Atrial fibrillation (H) [I48.91] [I48.91]  Long-term (current) use of anticoagulants [Z79.01] [Z79.01]         Anticoagulation Episode Summary     INR check location     Preferred lab     Send INR reminders to [facility]    Comments HIPPA Infor returned. OK to leave message on cell [phone]. OK to leave messages with Seferino Manning [phone]  labs @ [facility] faxed there 2/16.  fax: [phone]  Goes by Hima      Anticoagulation Care Providers     Provider Role Specialty Phone number    Clarisse Valdes MD [facility] Card[facility] [phone]            See the Encounter Report to view Anticoagulation Flowsheet and Dosing Calendar (Go to Encounters tab in chart review, and find the Anticoagulation Therapy Visit)    Left message for patient with results and dosing  recommendations. Asked patient to call back to report any missed doses, falls, signs and symptoms of bleeding or clotting, any changes in health, medication, or diet. Asked patient to call back with any questions or concerns.      Yoselin Hunter RN

## 2018-06-27 ENCOUNTER — CARE COORDINATION (OUTPATIENT)
Dept: CARDIOLOGY | Facility: CLINIC | Age: 78
End: 2018-06-27

## 2018-06-27 NOTE — PROGRESS NOTES
"Called Hima to see how he's been doing since he was in clinic last week. He is doing well and states he is \"feeling great\". His weight today was 183.8 lbs and has been within 1-2 lbs for last week. Denies SOB. He confirms he is taking 12.5 mg Spironolactone as there was confusion on this dosing prior.   Follows up with Dr Valdes on 7/16.  Verónica Jarquin RN   "

## 2018-07-03 ENCOUNTER — ANTICOAGULATION THERAPY VISIT (OUTPATIENT)
Dept: ANTICOAGULATION | Facility: CLINIC | Age: 78
End: 2018-07-03

## 2018-07-03 DIAGNOSIS — I48.20 CHRONIC ATRIAL FIBRILLATION (H): ICD-10-CM

## 2018-07-03 DIAGNOSIS — Z79.01 LONG-TERM (CURRENT) USE OF ANTICOAGULANTS: ICD-10-CM

## 2018-07-03 LAB — INR BLD: 1.6 (ref 0.86–1.14)

## 2018-07-03 PROCEDURE — 36416 COLLJ CAPILLARY BLOOD SPEC: CPT | Performed by: INTERNAL MEDICINE

## 2018-07-03 PROCEDURE — 85610 PROTHROMBIN TIME: CPT | Mod: QW | Performed by: INTERNAL MEDICINE

## 2018-07-03 NOTE — PROGRESS NOTES
ANTICOAGULATION FOLLOW-UP CLINIC VISIT    Patient Name:  Tad Manning  Date:  7/3/2018  Contact Type:  Telephone    SUBJECTIVE:     Patient Findings     Positives No Problem Findings           OBJECTIVE    INR Point of Care   Date Value Ref Range Status   07/03/2018 1.6 (H) 0.86 - 1.14 Final     Comment:     This test is intended for monitoring Coumadin therapy.  Results are not   accurate in patients with prolonged INR due to factor deficiency.         ASSESSMENT / PLAN  INR assessment SUB    Recheck INR In: 1 WEEK    INR Location Clinic      Anticoagulation Summary as of 7/3/2018     INR goal 2.0-3.0   Today's INR 1.6!   Warfarin maintenance plan No maintenance plan   Full warfarin instructions 7/3: 10 mg; 7/4: 7.5 mg; 7/5: 7.5 mg; 7/6: 5 mg; 7/7: 7.5 mg; 7/8: 7.5 mg; 7/9: 5 mg   Weekly warfarin total 32.5 mg   Plan last modified Darien Ballard RN (6/5/2018)   Next INR check 7/10/2018   Priority INR   Target end date Indefinite    Indications   Atrial fibrillation (H) [I48.91] [I48.91]  Long-term (current) use of anticoagulants [Z79.01] [Z79.01]         Anticoagulation Episode Summary     INR check location     Preferred lab     Send INR reminders to [facility]    Comments HIPPA Infor returned. OK to leave message on cell [phone]. OK to leave messages with Seferino Manning [phone]  labs @ [facility] faxed there 2/16.  fax: [phone]  Goes by Hima      Anticoagulation Care Providers     Provider Role Specialty Phone number    Clarisse Valdes MD Responsible Cardiology [phone]            See the Encounter Report to view Anticoagulation Flowsheet and Dosing Calendar (Go to Encounters tab in chart review, and find the Anticoagulation Therapy Visit)  Spoke with patient.    Chelsea Bland RN

## 2018-07-03 NOTE — MR AVS SNAPSHOT
Tad Tim Manning   7/3/2018   Anticoagulation Therapy Visit    Description:  77 year old male   Provider:  Chelsea Bland RN   Department:  [facility]           INR as of 7/3/2018     Today's INR 1.6!      Anticoagulation Summary as of 7/3/2018     INR goal 2.0-3.0   Today's INR 1.6!   Full warfarin instructions 7/3: 10 mg; 7/4: 7.5 mg; 7/5: 7.5 mg; 7/6: 5 mg; 7/7: 7.5 mg; 7/8: 7.5 mg; 7/9: 5 mg   Next INR check 7/10/2018    Indications   Atrial fibrillation (H) [I48.91] [I48.91]  Long-term (current) use of anticoagulants [Z79.01] [Z79.01]         July 2018 Details    Sun Mon Tue Wed Thu Fri Sat     1               2               3      10 mg   See details      4      7.5 mg         5      7.5 mg         6      5 mg         7      7.5 mg           8      7.5 mg         9      5 mg         10            11               12               13               14                 15               16               17               18               19               20               21                 22               23               24               25               26               27               28                 29               30               31                    Date Details   07/03 This INR check       Date of next INR:  7/10/2018         How to take your warfarin dose     To take:  5 mg Take 1 of the 5 mg tablets.    To take:  7.5 mg Take 1.5 of the 5 mg tablets.    To take:  10 mg Take 2 of the 5 mg tablets.

## 2018-07-10 ENCOUNTER — ANTICOAGULATION THERAPY VISIT (OUTPATIENT)
Dept: ANTICOAGULATION | Facility: CLINIC | Age: 78
End: 2018-07-10

## 2018-07-10 DIAGNOSIS — I48.20 CHRONIC ATRIAL FIBRILLATION (H): ICD-10-CM

## 2018-07-10 DIAGNOSIS — I50.22 CHRONIC SYSTOLIC HEART FAILURE (H): ICD-10-CM

## 2018-07-10 DIAGNOSIS — Z79.01 LONG-TERM (CURRENT) USE OF ANTICOAGULANTS: ICD-10-CM

## 2018-07-10 LAB
ANION GAP SERPL CALCULATED.3IONS-SCNC: 8 MMOL/L (ref 3–14)
BUN SERPL-MCNC: 78 MG/DL (ref 7–30)
CALCIUM SERPL-MCNC: 9.3 MG/DL (ref 8.5–10.1)
CHLORIDE SERPL-SCNC: 98 MMOL/L (ref 94–109)
CO2 SERPL-SCNC: 27 MMOL/L (ref 20–32)
CREAT SERPL-MCNC: 1.48 MG/DL (ref 0.66–1.25)
GFR SERPL CREATININE-BSD FRML MDRD: 46 ML/MIN/1.7M2
GLUCOSE SERPL-MCNC: 223 MG/DL (ref 70–99)
INR PPP: 1.97 (ref 0.86–1.14)
POTASSIUM SERPL-SCNC: 5.2 MMOL/L (ref 3.4–5.3)
SODIUM SERPL-SCNC: 133 MMOL/L (ref 133–144)

## 2018-07-10 PROCEDURE — 80048 BASIC METABOLIC PNL TOTAL CA: CPT | Performed by: INTERNAL MEDICINE

## 2018-07-10 PROCEDURE — 36415 COLL VENOUS BLD VENIPUNCTURE: CPT | Performed by: INTERNAL MEDICINE

## 2018-07-10 PROCEDURE — 85610 PROTHROMBIN TIME: CPT | Performed by: INTERNAL MEDICINE

## 2018-07-10 NOTE — PROGRESS NOTES
ANTICOAGULATION FOLLOW-UP CLINIC VISIT    Patient Name:  Tad Manning  Date:  7/10/2018  Contact Type:  Telephone    SUBJECTIVE:     Patient Findings     Positives Nose bleeds    Comments Tad states he noticed some blood while blowing his nose this morning, but it was not an active bleed and didn't last more than a couple minutes.           OBJECTIVE    INR   Date Value Ref Range Status   07/10/2018 1.97 (H) 0.86 - 1.14 Final       ASSESSMENT / PLAN  INR assessment THER    Recheck INR In: 1 WEEK    INR Location Clinic      Anticoagulation Summary as of 7/10/2018     INR goal 2.0-3.0   Today's INR 1.97!   Warfarin maintenance plan No maintenance plan   Full warfarin instructions 7/10: 7.5 mg; 7/11: 7.5 mg; 7/12: 7.5 mg; 7/13: 7.5 mg; 7/14: 7.5 mg; 7/15: 7.5 mg; 7/16: 5 mg   Weekly warfarin total 32.5 mg   Plan last modified Darien Ballard RN (6/5/2018)   Next INR check 7/17/2018   Priority INR   Target end date Indefinite    Indications   Atrial fibrillation (H) [I48.91] [I48.91]  Long-term (current) use of anticoagulants [Z79.01] [Z79.01]         Anticoagulation Episode Summary     INR check location     Preferred lab     Send INR reminders to [facility]    Comments HIPPA Infor returned. OK to leave message on cell [phone]. OK to leave messages with Seferino Manning [phone]  labs @ [facility] faxed there 2/16.  fax: [phone]  Goes by Hima      Anticoagulation Care Providers     Provider Role Specialty Phone number    Clarisse Valdes MD Responsible Cardiology [phone]            See the Encounter Report to view Anticoagulation Flowsheet and Dosing Calendar (Go to Encounters tab in chart review, and find the Anticoagulation Therapy Visit)    Spoke with Tad today, who states he has not missed any warfarin doses in the last week, nor has he had any changes to diet, medications, or health. He noticed some blood from his nose this morning while blowing his nose, but  it did not keep bleeding after blowing his nose. Went over recommendations and will get INR checked again in 1 week.    Cornelia Frye

## 2018-07-10 NOTE — MR AVS SNAPSHOT
Tad Manning   7/10/2018   Anticoagulation Therapy Visit    Description:  77 year old male   Provider:  Tad Huerta [facility]   Department:  [facility]           INR as of 7/10/2018     Today's INR 1.97!      Anticoagulation Summary as of 7/10/2018     INR goal 2.0-3.0   Today's INR 1.97!   Full warfarin instructions 7/10: 7.5 mg; 7/11: 7.5 mg; 7/12: 7.5 mg; 7/13: 7.5 mg; 7/14: 7.5 mg; 7/15: 7.5 mg; 7/16: 5 mg   Next INR check 7/17/2018    Indications   Atrial fibrillation (H) [I48.91] [I48.91]  Long-term (current) use of anticoagulants [Z79.01] [Z79.01]         July 2018 Details    Sun Mon Tue Wed Thu Fri Sat     1               2               3               4               5               6               7                 8               9               10      7.5 mg   See details      11      7.5 mg         12      7.5 mg         13      7.5 mg         14      7.5 mg           15      7.5 mg         16      5 mg         17            18               19               20               21                 22               23               24               25               26               27               28                 29               30               31                    Date Details   07/10 This INR check       Date of next INR:  7/17/2018         How to take your warfarin dose     To take:  5 mg Take 1 of the 5 mg tablets.    To take:  7.5 mg Take 1.5 of the 5 mg tablets.

## 2018-07-16 ENCOUNTER — OFFICE VISIT (OUTPATIENT)
Dept: CARDIOLOGY | Facility: CLINIC | Age: 78
End: 2018-07-16
Attending: INTERNAL MEDICINE
Payer: MEDICARE

## 2018-07-16 ENCOUNTER — RADIANT APPOINTMENT (OUTPATIENT)
Dept: CARDIOLOGY | Facility: CLINIC | Age: 78
End: 2018-07-16
Payer: MEDICARE

## 2018-07-16 VITALS
HEART RATE: 72 BPM | BODY MASS INDEX: 24.88 KG/M2 | WEIGHT: 183.7 LBS | OXYGEN SATURATION: 100 % | DIASTOLIC BLOOD PRESSURE: 69 MMHG | HEIGHT: 72 IN | SYSTOLIC BLOOD PRESSURE: 105 MMHG

## 2018-07-16 DIAGNOSIS — I25.10 CORONARY ARTERY DISEASE INVOLVING NATIVE CORONARY ARTERY OF NATIVE HEART WITHOUT ANGINA PECTORIS: ICD-10-CM

## 2018-07-16 DIAGNOSIS — E78.2 MIXED HYPERLIPIDEMIA: ICD-10-CM

## 2018-07-16 DIAGNOSIS — I50.22 CHRONIC SYSTOLIC HEART FAILURE (H): ICD-10-CM

## 2018-07-16 DIAGNOSIS — I48.20 CHRONIC ATRIAL FIBRILLATION (H): Primary | ICD-10-CM

## 2018-07-16 DIAGNOSIS — I50.22 CHRONIC SYSTOLIC CONGESTIVE HEART FAILURE (H): ICD-10-CM

## 2018-07-16 PROCEDURE — 93010 ELECTROCARDIOGRAM REPORT: CPT | Mod: ZP | Performed by: INTERNAL MEDICINE

## 2018-07-16 PROCEDURE — 93005 ELECTROCARDIOGRAM TRACING: CPT | Mod: ZF

## 2018-07-16 PROCEDURE — G0463 HOSPITAL OUTPT CLINIC VISIT: HCPCS | Mod: 25,ZF

## 2018-07-16 PROCEDURE — 99215 OFFICE O/P EST HI 40 MIN: CPT | Mod: GC | Performed by: INTERNAL MEDICINE

## 2018-07-16 RX ORDER — DIGOXIN 125 MCG
125 TABLET ORAL DAILY
Qty: 90 TABLET | Refills: 3 | Status: SHIPPED | OUTPATIENT
Start: 2018-07-16 | End: 2018-09-27 | Stop reason: DRUGHIGH

## 2018-07-16 ASSESSMENT — PAIN SCALES - GENERAL: PAINLEVEL: NO PAIN (0)

## 2018-07-16 NOTE — PATIENT INSTRUCTIONS
Patient Instructions:  It was a pleasure to see you in the cardiology clinic today.      If you have any questions, call  Jeaneth Montero RN, at [phone].  Press Option #1 for the [facility], and then press Option #3 for nursing.  We are encouraging the use of [employer] to communicate with your HealthCare Provider    Note the new medications: none  Stop the following medications: none    The results from today include: none  Please follow up with Dr. Clarisse Valdes in three months      If you have an urgent need after hours (8:00 am to 4:30 pm) please call [phone] and ask for the cardiology fellow on call.

## 2018-07-16 NOTE — MR AVS SNAPSHOT
After Visit Summary   7/16/2018    Tad Manning    MRN: [id number]           Patient Information     Date Of Birth          1940        Visit Information        Provider Department      7/16/2018 11:00 AM Clarisse Valdes MD M [facility]        Today's Diagnoses     Acute systolic heart failure (H)    -  1      Care Instructions    Patient Instructions:  It was a pleasure to see you in the cardiology clinic today.      If you have any questions, call  Jeaneth Montero RN, at [phone].  Press Option #1 for the [facility], and then press Option #3 for nursing.  We are encouraging the use of [employer] to communicate with your HealthCare Provider    Note the new medications: none  Stop the following medications: none    The results from today include: none  Please follow up with Dr. Clarisse Valdes in three months      If you have an urgent need after hours (8:00 am to 4:30 pm) please call [phone] and ask for the cardiology fellow on call.            Follow-ups after your visit        Your next 10 appointments already scheduled     Jul 17, 2018 10:00 AM CDT   LAB with LAB FIRST FLOOR [facility] ([facility])    [address] [place] [id number] [phone]           Please do not eat 10-12 hours before your appointment if you are coming in fasting for labs on lipids, cholesterol, or glucose (sugar). This does not apply to pregnant women. Water, hot tea and black coffee (with nothing added) are okay. Do not drink other fluids, diet soda or chew gum.            Oct 22, 2018  1:00 PM CDT   (Arrive by 12:45 PM)   Return Visit with MD DANYEL Portillo [facility] ([facility])    [address]  [address] [place] [id number] [phone]              Who to contact     If you have questions or need follow up information about today's clinic visit or your  schedule please contact [facility] directly at [phone].  Normal or non-critical lab and imaging results will be communicated to you by MyChart, letter or phone within 4 business days after the clinic has received the results. If you do not hear from us within 7 days, please contact the clinic through MyChart or phone. If you have a critical or abnormal lab result, we will notify you by phone as soon as possible.  Submit refill requests through [facility] or call your pharmacy and they will forward the refill request to us. Please allow 3 business days for your refill to be completed.          Additional Information About Your Visit        [employer] [employer] Information     [employer] gives you secure access to your electronic health record. If you see a primary care provider, you can also send messages to your care team and make appointments. If you have questions, please call your primary care clinic.  If you do not have a primary care provider, please call [phone] and they will assist you.        Care EveryWhere ID     This is your Care EveryWhere ID. This could be used by other organizations to access your [facility] medical records  [id number]        Your Vitals Were     Pulse Height Pulse Oximetry BMI (Body Mass Index)          72 1.829 m (6') 100% 24.91 kg/m2         Blood Pressure from Last 3 Encounters:   07/16/18 105/69   06/20/18 107/70   05/18/18 116/75    Weight from Last 3 Encounters:   07/16/18 83.3 kg (183 lb 11.2 oz)   06/20/18 83.5 kg (184 lb)   05/18/18 89.8 kg (198 lb)              We Performed the Following     EKG 12-lead, tracing only (Same Day)     Follow-Up with Cardiologist        Primary Care Provider Office Phone # Fax #    Gene Guevara [phone]       [address] [address]        Equal Access to Services     STUART ELLISON : Yumiko Urena, kellie agudelo, myah fonseca, deandre valerio  joao howard ah. So [facility] [phone].    ATENCIÓN: Si dayanala tyrell, tiene a noe disposición servicios gratuitos de asistencia lingüística. Joey al [phone].    We comply with applicable federal civil rights laws and [place] laws. We do not discriminate on the basis of race, color, national origin, age, disability, sex, sexual orientation, or gender identity.            Thank you!     Thank you for choosing [facility]  for your care. Our goal is always to provide you with excellent care. Hearing back from our patients is one way we can continue to improve our services. Please take a few minutes to complete the written survey that you may receive in the mail after your visit with us. Thank you!             Your Updated Medication List - Protect others around you: Learn how to safely use, store and throw away your medicines at www.disposemymeds.org.          This list is accurate as of 7/16/18 11:57 AM.  Always use your most recent med list.                   Brand Name Dispense Instructions for use Diagnosis    acetaminophen 325 MG tablet    TYLENOL     Take 325 mg by mouth every 4 hours as needed for mild pain        ALPRAZolam 0.25 MG tablet    XANAX     TK 1 T PO QHS PRN    Epidural abscess       atorvastatin 20 MG tablet    LIPITOR    90 tablet    TAKE 1 TABLET BY MOUTH EVERY DAY    Claudication of left lower extremity (H)       CELEXA PO      Take 10 mg by mouth daily        cholecalciferol 1000 units Tabs     30 tablet    Take 1,000 Units by mouth daily    Loop diuretic causing adverse effect in therapeutic use, subsequent encounter       clopidogrel 75 MG tablet    PLAVIX    90 tablet    Take 1 tablet (75 mg) by mouth daily    Status post percutaneous transluminal coronary angioplasty, Coronary artery disease involving native coronary artery of native heart without angina pectoris       furosemide 20 MG tablet    LASIX    180 tablet    Take 3 tablets (60 mg) by mouth 2 times daily    Chronic  systolic heart failure (H)       lisinopril 10 MG tablet    PRINIVIL/ZESTRIL    30 tablet    Take 1 tablet (10 mg) by mouth daily        MELATONIN PO      Take 5 mg by mouth daily        metFORMIN 500 MG tablet    GLUCOPHAGE    60 tablet    Take 1 tablet (500 mg) by mouth 2 times daily (with meals)    Type 2 diabetes mellitus with diabetic polyneuropathy, without long-term current use of insulin (H)       metoprolol succinate 200 MG 24 hr tablet    TOPROL-XL    90 tablet    Take 0.5 tablets (100 mg) by mouth daily    Chronic systolic heart failure (H)       nitroGLYcerin 0.4 MG sublingual tablet    NITROSTAT    25 tablet    For chest pain place 1 tablet under the tongue every 5 minutes for 3 doses. If symptoms persist 5 minutes after 1st dose call 911.    Atrial flutter, unspecified type (H)       nystatin cream    MYCOSTATIN     Apply topically 2 times daily        order for DME     1 Device    Equipment being ordered: DH2 shoe    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), Diabetic ulcer of left foot due to type 2 diabetes mellitus (H)       ranitidine 150 MG tablet    ZANTAC    60 tablet    Take 1 tablet (150 mg) by mouth 2 times daily    Gastroesophageal reflux disease without esophagitis       sodium chloride 0.65 % nasal spray    EQ SALINE NASAL SPRAY    1 Bottle    Spray 1 spray into both nostrils daily as needed for congestion        spironolactone 25 MG tablet    ALDACTONE    45 tablet    Take 0.5 tablets (12.5 mg) by mouth daily        tamsulosin 0.4 MG capsule    FLOMAX    60 capsule    Take 2 capsules (0.8 mg) by mouth At Bedtime    Renal insufficiency       warfarin 5 MG tablet    COUMADIN    90 tablet    Take 1 tablet (5 mg) by mouth daily Dose adjusted per [facility].    Paroxysmal atrial fibrillation (H)

## 2018-07-16 NOTE — PROGRESS NOTES
Cardiology Clinic Note  07/16/2018    HPI:   Mr. Tad Manning is a 77-year old male with a past medical history of CLL in remission, CAD s/p CABG in 1995,HFrEF, atrial flutter, PVD s/p iliac artery stents and left lower extremity bypass Hypertension, diabetes, hyperlipidemia, discitis and multple admission for decompensated ICM/HFrEF who presented for follow-up of cardiomyopathy.     He was seen in our clinic in February 2018 and was hypervolemic. After diuresis, he underwent a LHC that showed moderate disease in his RCA, severe LAD disease and patent grafts and had two drug eluting stents placed in his mid LAD. RHC showed elevated right and left sided pressures with type II pulmonary hypertension.     Interval History:  Pt feels well since his last appt with no new issues. Pt denies any chest pains, SOB, ALEXIS, palpitations, lightheadedness or dizziness. Pt has not been exercising as much the past few weeks 2/2 heat, but plans to do more once the humidity has improved. Pt is having no issues with his medications and denies any signs/symptoms of bleeding.    ROS:  A complete 10-pt ROS was negative except as above.    Cardiac meds  - furosemide 60 mg BID  - lisinopril 10mg daily  - warfarin  - metoprolol  mg daily  - spironolactone 12.5 mg daily  - atorvastatin 20 mg daily  - clopidogrel 75 mg daily  - Nitro PRN    Current Outpatient Prescriptions   Medication Sig Dispense Refill     acetaminophen (TYLENOL) 325 MG tablet Take 325 mg by mouth every 4 hours as needed for mild pain       ALPRAZolam (XANAX) 0.25 MG tablet TK 1 T PO QHS PRN  1     atorvastatin (LIPITOR) 20 MG tablet TAKE 1 TABLET BY MOUTH EVERY DAY 90 tablet 3     cholecalciferol 1000 UNITS TABS Take 1,000 Units by mouth daily 30 tablet      Citalopram Hydrobromide (CELEXA PO) Take 10 mg by mouth daily       clopidogrel (PLAVIX) 75 MG tablet Take 1 tablet (75 mg) by mouth daily 90 tablet 3     furosemide (LASIX) 20 MG tablet Take 3 tablets  (60 mg) by mouth 2 times daily 180 tablet 3     lisinopril (PRINIVIL/ZESTRIL) 10 MG tablet Take 1 tablet (10 mg) by mouth daily 30 tablet      MELATONIN PO Take 5 mg by mouth daily       metFORMIN (GLUCOPHAGE) 500 MG tablet Take 1 tablet (500 mg) by mouth 2 times daily (with meals) 60 tablet      metoprolol succinate (TOPROL-XL) 200 MG 24 hr tablet Take 0.5 tablets (100 mg) by mouth daily 90 tablet 3     nitroglycerin (NITROSTAT) 0.4 MG sublingual tablet For chest pain place 1 tablet under the tongue every 5 minutes for 3 doses. If symptoms persist 5 minutes after 1st dose call 911. 25 tablet      nystatin (MYCOSTATIN) cream Apply topically 2 times daily       order for DME Equipment being ordered: 2 shoe 1 Device 0     ranitidine (ZANTAC) 150 MG tablet Take 1 tablet (150 mg) by mouth 2 times daily 60 tablet      sodium chloride (EQ SALINE NASAL SPRAY) 0.65 % nasal spray Spray 1 spray into both nostrils daily as needed for congestion 1 Bottle 1     spironolactone (ALDACTONE) 25 MG tablet Take 0.5 tablets (12.5 mg) by mouth daily 45 tablet 3     tamsulosin (FLOMAX) 0.4 MG capsule Take 2 capsules (0.8 mg) by mouth At Bedtime 60 capsule      warfarin (COUMADIN) 5 MG tablet Take 1 tablet (5 mg) by mouth daily Dose adjusted per Coumadin Clinic. 90 tablet 3       Past Medical History:   Diagnosis Date     Arthritis      ASCVD (arteriosclerotic cardiovascular disease)      BMI 30.0-30.9,adult      BPH (benign prostatic hypertrophy)      Cellulitis      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)      Coronary artery disease     triple bypass 1995     Diabetes mellitus (H)      Diabetic polyneuropathy (H)      History of blood transfusion      Hyperlipidaemia      Hypertension      Noninflammatory pericardial effusion      Osteomyelitis of left foot (H)      PVD (peripheral vascular disease) (H)      Sebaceous cyst        Past Surgical History:   Procedure Laterality Date     AMPUTATE  TOE(S)  1/10/2014    Procedure: AMPUTATE TOE(S);;  Surgeon: Amador Vick MD;  Location:  OR     BONE MARROW BIOPSY, BONE SPECIMEN, NEEDLE/TROCAR  10/19/2012    Procedure: BIOPSY BONE MARROW;  BONE MARROW BIOPSY  (CONSCIOUS SED);  Surgeon: Ramon Quesada MD;  Location:  GI     BYPASS GRAFT FEMOROPOPLITEAL  1/10/2014    Procedure: BYPASS GRAFT FEMOROPOPLITEAL;  Left above knee popliteal to  Below knee popliteal bypass using transverse saphenous vein graft. Left 2nd Toe amputation;  Surgeon: Amador Vick MD;  Location:  OR     CARDIAC SURGERY      angioplasty with stent, triple bypass     EXCISE CYST GENERIC (LOCATION) N/A 2/1/2016    Procedure: EXCISE CYST GENERIC (LOCATION);  Surgeon: Amador Vick MD;  Location:  SD     GRAFT VEIN FROM EXTREMITY (LOCATION)  1/10/2014    Procedure: GRAFT VEIN FROM EXTREMITY (LOCATION);;  Surgeon: Amador Vick MD;  Location:  OR     LAMINECTOMY THORACIC ONE LEVEL N/A 2/8/2017    Procedure: LAMINECTOMY THORACIC ONE LEVEL;  Surgeon: Marcelo Trent MD;  Location: [facility] OR     OPTICAL TRACKING SYSTEM FUSION POSTERIOR SPINE THORACIC THREE+ LEVELS N/A 2/12/2017    Procedure: OPTICAL TRACKING SYSTEM FUSION POSTERIOR SPINE THORACIC THREE+ LEVELS;  Surgeon: Marcelo Trent MD;  Location: [facility] OR     TONSILLECTOMY       VASCULAR SURGERY      ptcr legs   T9-10 discitis with overlying epidural abscess  S/p laminectomy / abscess evacuation.  Critical lower limb ischemia , s/p L AK pop to BK pop bypass with rGSV on 1/10/2014  Family History   Problem Relation Age of Onset     Cancer Mother      leukemia       Social History   Substance Use Topics     Smoking status: Former Smoker     Packs/day: 2.00     Years: 30.00     Types: Cigarettes     Quit date: 6/19/1995     Smokeless tobacco: Never Used     Alcohol use Yes      Comment: 1 drink monthly       Allergies   Allergen Reactions     Blood Transfusion Related (Informational Only) Other (See Comments)      Patient has a history of a clinically significant antibody against RBC antigens.  A delay in compatible RBCs may occur.        Physical Examination:  /69 (BP Location: Left arm, Cuff Size: Adult Regular)  Pulse 72  Ht 1.829 m (6')  Wt 83.3 kg (183 lb 11.2 oz)  SpO2 100%  BMI 24.91 kg/m2 Body mass index is 24.91 kg/(m^2).  Gen: pleasant male in no apparent distress      HEENT: NC/AT, EOMI  Neck: supple, no JVD  Heart: irregularly, irregular, no m/r/g   Lungs: Clear bilaterally, no wheezes or crackles  Abdomen: Soft, nontender, nondistended  Extremities: trace peripheral edema, 2+ peripheral pulses  Neuro: alert and oriented, no focal deficits  Skin: warm and dry, no rashes on exposed surfaces  Musculoskeletal: b/l knee arthritis, ambulated with a walker    Laboratory:  Chemistry panel:   Recent Labs   Lab Test  07/10/18   1025  06/19/18   1001   04/09/18   0951  03/07/18   0453   04/08/17   0927   04/06/17   0537   NA  133  139   < >  139  140   < >  144   < >  144   POTASSIUM  5.2  4.8   < >  4.7  3.7   < >  3.5   < >  3.6   CHLORIDE  98  101   < >  107  105   < >  106   < >  108   CO2  27  32   < >  24  25   < >  29   < >  25   ANIONGAP  8  6   < >  8  10   < >  9   < >  10   GLC  223*  175*   < >  129*  131*   < >  119*   < >  120*   BUN  78*  56*   < >  30  26   < >  9   < >  10   CR  1.48*  1.40*   < >  1.06  0.93   < >  0.63*   < >  0.63*   JUSTINO  9.3  9.5   < >  9.2  8.6   < >  8.1*   < >  8.0*   MAG   --    --    --    --   1.7   --   1.4*   < >   --    GFRESTIMATED  46*  49*   < >  68  79   < >  >90  Non  GFR Calc     < >  >90  Non  GFR Calc     AST   --    --    --   17   --    --    --    --   10   ALT   --    --    --   33   --    --    --    --   17    < > = values in this interval not displayed.       CBC:   Recent Labs   Lab Test  03/07/18   0453  03/06/18   0717   WBC  4.2  5.1   RBC  4.08*  4.51   HGB  11.4*  12.5*   HCT  36.8*  41.4   MCV  90  92   MCH   27.9  27.7   MCHC  31.0*  30.2*   RDW  17.8*  18.0*   PLT  98*  107*       Lipid Panel:  Recent Labs   Lab Test  04/09/18   0951  05/23/14   0745  01/10/14   0923   CHOL  65  134  92   HDL  30*  34*  28*   LDL  23  48  45   TRIG  58  257*  92   CHOLHDLRATIO   --   3.9  3.3       Thyroid:   No results found for: TSH  No results found for: T4  Hemoglobin A1C   Date Value Ref Range Status   02/10/2017 6.1 (H) 4.3 - 6.0 % Final     INR   Date Value Ref Range Status   07/10/2018 1.97 (H) 0.86 - 1.14 Final         Cardiac data:  ECG today: Atrial fibrillation with rate of 75, nonspecific T-wave changes seen laterally (seen previously)        ECG 04/18: atrial fibrillation that is well controlled, nonspecific T-wave abnormalities laterally.    [facility] 3/2018    [facility] 3/2018    Echo 2/18  Interpretation Summary  Severe left ventricular dilation is present.  Biplane LV EF 20%.  Severe diffuse hypokinesis is present.  Mild to moderate right ventricular dilation is present.  Global right ventricular function is moderately to severely reduced.  Dilation of the inferior vena cava is present with abnormal respiratory  variation in diameter.  No pericardial effusion is present.  A left pleural effusion is present.    ECG February 2018  Atrial fibrillation nonspecific ST-T abnormalities    EKG 04/06/17:    NSR Rate approx 125 bpm, LAD, no ischemic changes.    Echo (2/24/2017)  Technically difficult study.The patient's rhythm is atrial fibrillation.  The left ventricle is severely dilated. Left ventricular systolic function is  severely reduced, ejection fraction is estimated at 25-30%. There is severe  global hypokinesis.  Right ventricular systolic function is mildly reduced.  The right ventricular systolic pressure is approximated at 15 mmHg plus the  right atrial pressure. IVC is plethoric and estimated mean RA pressure is 15  mmHg.  No pericardial effusion present.    Assessment/Plan:  1. HFrEF, Stage C, NYHA 3  Severe LV dysfunction  EF 20% . Euvolemic on exam today.  - Continue lasix 60 mg BID  - Continue lisinopril, spironolactone, metoprolol    2. CAD  s/p CABG L-LAD, SVG-OM1, SVG-D1 in 1995. s/p LHc in 3/2018 and PCI to mLAD. Not on ASA 2/2 already being on plavix and warfarin.  - continue plavix, metoprolol XL, lisinopril, and atorvastatin 20 mg daily    3. Persistent Atrial Fibrillation  Rates controlled. Asymptomatic.  - Continue warfarin, INR goal 2-3  - Continue metoprolol  mg daily  - Possible AFlutter component, Pt not interested in EP consult for ablation consideration at this time    4. Essential Hypertension  - well controlled on current regimen    5. Dyslipidemia  - Continue atorvastatin 20 mg daily    6. DM Type II  - continue metformin    7. PAD with LE stents and bypass   - Continue plavix (not on ASA 2/2 being on warfarin as well)  - exercise as able    Follow-up: 6 months    Seen and discussed with Dr. Lucio Bah MD  Cardiology Fellow, PGY-4  [phone]      ATTENDING ATTESTATION:  This patient has been seen and examined by me July 16, 2018 with Alma Delia Bah MD, cardiology fellow. I have reviewed the vitals, laboratory and imaging data relevant to this patient's care. I have edited this note to reflect our joint assessment and plan, and discussed the plan with the patient.  Mr. Manning returns for follow-up.  He has ischemic cardiomyopathy and chronic systolic heart failure with an ejection fraction around 20%. He underwent a coronary angiogram in March 2018 and was noted to have patent grafts and his native  LAD had significant obstructive disease after the LIMA anastomosis which was intervened with placement of drug-eluting stents.  He was hospitalized overnight for diuresis given the elevated filling pressures.    On exam he is euvolemic.  His blood pressure is in low normal range.  His atrial fibrillation is well controlled.  His laboratory data reviewed today shows stable renal function.     Echocardiogram from this morning was reviewed and is notable for severely reduced LV function, LVEF 25%.  This is a slight improvement since his last echo which was done in February 2018.  He would benefit from an ICD implantation but he is extremely leery about proceeding with an invasive procedure at this point.  He is a good candidate for digoxin which I will initiate today. His lipids are controlled on atorvastatin to 20 mg once daily.  He was again advised to monitor closely for bleeding issues since he is on warfarin and Plavix.  Advise follow-up with me in 3 months.  Clarisse Valdes MD, MS  Staff Cardiologist  Pager: [phone]

## 2018-07-16 NOTE — LETTER
7/16/2018      RE: Tad Manning  [address] [facility] [address] [place] [id number]     Dear Colleague,    Thank you for the opportunity to participate in the care of your patient, Tad Manning, at the [facility]. Please see a copy of my visit note below.            Cardiology Clinic Note  07/16/2018    HPI:   Mr. Tad Manning is a 77-year old male with a past medical history of CLL in remission, CAD s/p CABG in 1995,HFrEF, atrial flutter, PVD s/p iliac artery stents and left lower extremity bypass Hypertension, diabetes, hyperlipidemia, discitis and multple admission for decompensated ICM/HFrEF who presented for follow-up of cardiomyopathy.     He was seen in our clinic in February 2018 and was hypervolemic. After diuresis, he underwent a LHC that showed moderate disease in his RCA, severe LAD disease and patent grafts and had two drug eluting stents placed in his mid LAD. RHC showed elevated right and left sided pressures with type II pulmonary hypertension.     Interval History:  Pt feels well since his last appt with no new issues. Pt denies any chest pains, SOB, ALEXIS, palpitations, lightheadedness or dizziness. Pt has not been exercising as much the past few weeks 2/2 heat, but plans to do more once the humidity has improved. Pt is having no issues with his medications and denies any signs/symptoms of bleeding.    ROS:  A complete 10-pt ROS was negative except as above.    Cardiac meds  - furosemide 60 mg BID  - lisinopril 10mg daily  - warfarin  - metoprolol  mg daily  - spironolactone 12.5 mg daily  - atorvastatin 20 mg daily  - clopidogrel 75 mg daily  - Nitro PRN    Current Outpatient Prescriptions   Medication Sig Dispense Refill     acetaminophen (TYLENOL) 325 MG tablet Take 325 mg by mouth every 4 hours as needed for mild pain       ALPRAZolam (XANAX) 0.25 MG tablet TK 1 T PO QHS PRN  1     atorvastatin (LIPITOR) 20 MG  tablet TAKE 1 TABLET BY MOUTH EVERY DAY 90 tablet 3     cholecalciferol 1000 UNITS TABS Take 1,000 Units by mouth daily 30 tablet      Citalopram Hydrobromide (CELEXA PO) Take 10 mg by mouth daily       clopidogrel (PLAVIX) 75 MG tablet Take 1 tablet (75 mg) by mouth daily 90 tablet 3     furosemide (LASIX) 20 MG tablet Take 3 tablets (60 mg) by mouth 2 times daily 180 tablet 3     lisinopril (PRINIVIL/ZESTRIL) 10 MG tablet Take 1 tablet (10 mg) by mouth daily 30 tablet      MELATONIN PO Take 5 mg by mouth daily       metFORMIN (GLUCOPHAGE) 500 MG tablet Take 1 tablet (500 mg) by mouth 2 times daily (with meals) 60 tablet      metoprolol succinate (TOPROL-XL) 200 MG 24 hr tablet Take 0.5 tablets (100 mg) by mouth daily 90 tablet 3     nitroglycerin (NITROSTAT) 0.4 MG sublingual tablet For chest pain place 1 tablet under the tongue every 5 minutes for 3 doses. If symptoms persist 5 minutes after 1st dose call 911. 25 tablet      nystatin (MYCOSTATIN) cream Apply topically 2 times daily       order for DME Equipment being ordered: 2 shoe 1 Device 0     ranitidine (ZANTAC) 150 MG tablet Take 1 tablet (150 mg) by mouth 2 times daily 60 tablet      sodium chloride (EQ SALINE NASAL SPRAY) 0.65 % nasal spray Spray 1 spray into both nostrils daily as needed for congestion 1 Bottle 1     spironolactone (ALDACTONE) 25 MG tablet Take 0.5 tablets (12.5 mg) by mouth daily 45 tablet 3     tamsulosin (FLOMAX) 0.4 MG capsule Take 2 capsules (0.8 mg) by mouth At Bedtime 60 capsule      warfarin (COUMADIN) 5 MG tablet Take 1 tablet (5 mg) by mouth daily Dose adjusted per Coumadin Clinic. 90 tablet 3       Past Medical History:   Diagnosis Date     Arthritis      ASCVD (arteriosclerotic cardiovascular disease)      BMI 30.0-30.9,adult      BPH (benign prostatic hypertrophy)      Cellulitis      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)      Coronary artery disease     triple bypass 1995      Diabetes mellitus (H)      Diabetic polyneuropathy (H)      History of blood transfusion      Hyperlipidaemia      Hypertension      Noninflammatory pericardial effusion      Osteomyelitis of left foot (H)      PVD (peripheral vascular disease) (H)      Sebaceous cyst        Past Surgical History:   Procedure Laterality Date     AMPUTATE TOE(S)  1/10/2014    Procedure: AMPUTATE TOE(S);;  Surgeon: Amador Vick MD;  Location:  OR     BONE MARROW BIOPSY, BONE SPECIMEN, NEEDLE/TROCAR  10/19/2012    Procedure: BIOPSY BONE MARROW;  BONE MARROW BIOPSY  (CONSCIOUS SED);  Surgeon: Ramon Quesada MD;  Location:  GI     BYPASS GRAFT FEMOROPOPLITEAL  1/10/2014    Procedure: BYPASS GRAFT FEMOROPOPLITEAL;  Left above knee popliteal to  Below knee popliteal bypass using transverse saphenous vein graft. Left 2nd Toe amputation;  Surgeon: Amador Vick MD;  Location:  OR     CARDIAC SURGERY      angioplasty with stent, triple bypass     EXCISE CYST GENERIC (LOCATION) N/A 2/1/2016    Procedure: EXCISE CYST GENERIC (LOCATION);  Surgeon: Amador Vick MD;  Location:  SD     GRAFT VEIN FROM EXTREMITY (LOCATION)  1/10/2014    Procedure: GRAFT VEIN FROM EXTREMITY (LOCATION);;  Surgeon: Amador Vick MD;  Location:  OR     LAMINECTOMY THORACIC ONE LEVEL N/A 2/8/2017    Procedure: LAMINECTOMY THORACIC ONE LEVEL;  Surgeon: Marcelo Trent MD;  Location: [facility] OR     OPTICAL TRACKING SYSTEM FUSION POSTERIOR SPINE THORACIC THREE+ LEVELS N/A 2/12/2017    Procedure: OPTICAL TRACKING SYSTEM FUSION POSTERIOR SPINE THORACIC THREE+ LEVELS;  Surgeon: Marcelo Trent MD;  Location: [facility] OR     TONSILLECTOMY       VASCULAR SURGERY      ptcr legs   T9-10 discitis with overlying epidural abscess  S/p laminectomy / abscess evacuation.  Critical lower limb ischemia , s/p L AK pop to BK pop bypass with rGSV on 1/10/2014  Family History   Problem Relation Age of Onset     Cancer Mother      leukemia       Social  History   Substance Use Topics     Smoking status: Former Smoker     Packs/day: 2.00     Years: 30.00     Types: Cigarettes     Quit date: 6/19/1995     Smokeless tobacco: Never Used     Alcohol use Yes      Comment: 1 drink monthly       Allergies   Allergen Reactions     Blood Transfusion Related (Informational Only) Other (See Comments)     Patient has a history of a clinically significant antibody against RBC antigens.  A delay in compatible RBCs may occur.        Physical Examination:  /69 (BP Location: Left arm, Cuff Size: Adult Regular)  Pulse 72  Ht 1.829 m (6')  Wt 83.3 kg (183 lb 11.2 oz)  SpO2 100%  BMI 24.91 kg/m2 Body mass index is 24.91 kg/(m^2).  Gen: pleasant male in no apparent distress      HEENT: NC/AT, EOMI  Neck: supple, no JVD  Heart: irregularly, irregular, no m/r/g   Lungs: Clear bilaterally, no wheezes or crackles  Abdomen: Soft, nontender, nondistended  Extremities: trace peripheral edema, 2+ peripheral pulses  Neuro: alert and oriented, no focal deficits  Skin: warm and dry, no rashes on exposed surfaces  Musculoskeletal: b/l knee arthritis, ambulated with a walker    Laboratory:  Chemistry panel:   Recent Labs   Lab Test  07/10/18   1025  06/19/18   1001   04/09/18   0951  03/07/18   0453   04/08/17   0927   04/06/17   0537   NA  133  139   < >  139  140   < >  144   < >  144   POTASSIUM  5.2  4.8   < >  4.7  3.7   < >  3.5   < >  3.6   CHLORIDE  98  101   < >  107  105   < >  106   < >  108   CO2  27  32   < >  24  25   < >  29   < >  25   ANIONGAP  8  6   < >  8  10   < >  9   < >  10   GLC  223*  175*   < >  129*  131*   < >  119*   < >  120*   BUN  78*  56*   < >  30  26   < >  9   < >  10   CR  1.48*  1.40*   < >  1.06  0.93   < >  0.63*   < >  0.63*   JUSTINO  9.3  9.5   < >  9.2  8.6   < >  8.1*   < >  8.0*   MAG   --    --    --    --   1.7   --   1.4*   < >   --    GFRESTIMATED  46*  49*   < >  68  79   < >  >90  Non  GFR Calc     < >  >90  Non   American GFR Calc     AST   --    --    --   17   --    --    --    --   10   ALT   --    --    --   33   --    --    --    --   17    < > = values in this interval not displayed.     CBC:   Recent Labs   Lab Test  03/07/18   0453  03/06/18   0717   WBC  4.2  5.1   RBC  4.08*  4.51   HGB  11.4*  12.5*   HCT  36.8*  41.4   MCV  90  92   MCH  27.9  27.7   MCHC  31.0*  30.2*   RDW  17.8*  18.0*   PLT  98*  107*     Lipid Panel:  Recent Labs   Lab Test  04/09/18   0951  05/23/14   0745  01/10/14   0923   CHOL  65  134  92   HDL  30*  34*  28*   LDL  23  48  45   TRIG  58  257*  92   CHOLHDLRATIO   --   3.9  3.3     Thyroid:   No results found for: TSH  No results found for: T4  Hemoglobin A1C   Date Value Ref Range Status   02/10/2017 6.1 (H) 4.3 - 6.0 % Final     INR   Date Value Ref Range Status   07/10/2018 1.97 (H) 0.86 - 1.14 Final     Cardiac data:  ECG today: Atrial fibrillation with rate of 75, nonspecific T-wave changes seen laterally (seen previously)        ECG 04/18: atrial fibrillation that is well controlled, nonspecific T-wave abnormalities laterally.    [facility] 3/2018    [facility] 3/2018    Echo 2/18  Interpretation Summary  Severe left ventricular dilation is present.  Biplane LV EF 20%.  Severe diffuse hypokinesis is present.  Mild to moderate right ventricular dilation is present.  Global right ventricular function is moderately to severely reduced.  Dilation of the inferior vena cava is present with abnormal respiratory  variation in diameter.  No pericardial effusion is present.  A left pleural effusion is present.    ECG February 2018  Atrial fibrillation nonspecific ST-T abnormalities    EKG 04/06/17:    NSR Rate approx 125 bpm, LAD, no ischemic changes.    Echo (2/24/2017)  Technically difficult study.The patient's rhythm is atrial fibrillation.  The left ventricle is severely dilated. Left ventricular systolic function is  severely reduced, ejection fraction is estimated at 25-30%. There is severe  global  hypokinesis.  Right ventricular systolic function is mildly reduced.  The right ventricular systolic pressure is approximated at 15 mmHg plus the  right atrial pressure. IVC is plethoric and estimated mean RA pressure is 15  mmHg.  No pericardial effusion present.    Assessment/Plan:  1. HFrEF, Stage C, NYHA 3  Severe LV dysfunction EF 20% . Euvolemic on exam today.  - Continue lasix 60 mg BID  - Continue lisinopril, spironolactone, metoprolol    2. CAD  s/p CABG L-LAD, SVG-OM1, SVG-D1 in 1995. s/p LHc in 3/2018 and PCI to mLAD. Not on ASA 2/2 already being on plavix and warfarin.  - continue plavix, metoprolol XL, lisinopril, and atorvastatin 20 mg daily    3. Persistent Atrial Fibrillation  Rates controlled. Asymptomatic.  - Continue warfarin, INR goal 2-3  - Continue metoprolol  mg daily  - Possible AFlutter component, Pt not interested in EP consult for ablation consideration at this time    4. Essential Hypertension  - well controlled on current regimen    5. Dyslipidemia  - Continue atorvastatin 20 mg daily    6. DM Type II  - continue metformin    7. PAD with LE stents and bypass   - Continue plavix (not on ASA 2/2 being on warfarin as well)  - exercise as able    Follow-up: 6 months    Seen and discussed with Dr. Lucio Bah MD  Cardiology Fellow, PGY-4  [phone]    ATTENDING ATTESTATION:  This patient has been seen and examined by me July 16, 2018 with Alma Delia Bah MD, cardiology fellow. I have reviewed the vitals, laboratory and imaging data relevant to this patient's care. I have edited this note to reflect our joint assessment and plan, and discussed the plan with the patient.  Mr. Manning returns for follow-up.  He has ischemic cardiomyopathy and chronic systolic heart failure with an ejection fraction around 20%. He underwent a coronary angiogram in March 2018 and was noted to have patent grafts and his native  LAD had significant obstructive disease after the LIMA  anastomosis which was intervened with placement of drug-eluting stents.  He was hospitalized overnight for diuresis given the elevated filling pressures.    On exam he is euvolemic.  His blood pressure is in low normal range.  His atrial fibrillation is well controlled.  His laboratory data reviewed today shows stable renal function.     Echocardiogram from this morning was reviewed and is notable for severely reduced LV function, LVEF 25%.  This is a slight improvement since his last echo which was done in February 2018.  He would benefit from an ICD implantation but he is extremely leery about proceeding with an invasive procedure at this point.  He is a good candidate for digoxin which I will initiate today. His lipids are controlled on atorvastatin to 20 mg once daily.  He was again advised to monitor closely for bleeding issues since he is on warfarin and Plavix.  Advise follow-up with me in 3 months.    Clarisse Valdes MD, MS  Staff Cardiologist  Pager: [phone]

## 2018-07-16 NOTE — NURSING NOTE
Chief Complaint   Patient presents with     Follow Up For      manage heart failure and atrial fibrillation     Vitals were taken and medications were reconciled. EKG was performed    MOE Ocasio  11:08 AM

## 2018-07-17 ENCOUNTER — ANTICOAGULATION THERAPY VISIT (OUTPATIENT)
Dept: ANTICOAGULATION | Facility: CLINIC | Age: 78
End: 2018-07-17

## 2018-07-17 ENCOUNTER — CARE COORDINATION (OUTPATIENT)
Dept: CARDIOLOGY | Facility: CLINIC | Age: 78
End: 2018-07-17

## 2018-07-17 DIAGNOSIS — Z79.01 LONG-TERM (CURRENT) USE OF ANTICOAGULANTS: ICD-10-CM

## 2018-07-17 DIAGNOSIS — I48.20 CHRONIC ATRIAL FIBRILLATION (H): ICD-10-CM

## 2018-07-17 LAB
INR BLD: 2.7 (ref 0.86–1.14)
INTERPRETATION ECG - MUSE: NORMAL

## 2018-07-17 PROCEDURE — 85610 PROTHROMBIN TIME: CPT | Mod: QW | Performed by: INTERNAL MEDICINE

## 2018-07-17 PROCEDURE — 36416 COLLJ CAPILLARY BLOOD SPEC: CPT | Performed by: INTERNAL MEDICINE

## 2018-07-17 NOTE — PROGRESS NOTES
Date: 7/17/2018    Time of Call: 12:03 PM     Diagnosis:  Systolic heart failure     [ TORB ] Ordering provider: Dr. Clarisse Valdes  Order: start digoxin 0.125 mg by mouth daily     Order received by: Jeaneth Montero RN     Follow-up/additional notes: New Medication: Patient was educated regarding newly prescribed medication, including discussion of  the indication, administration, side effects, and when to report to MD or RN. Patient demonstrated understanding of this information and agreed to call with further questions or concerns.

## 2018-07-17 NOTE — MR AVS SNAPSHOT
Tad Manning   7/17/2018   Anticoagulation Therapy Visit    Description:  77 year old male   Provider:  Vicki Hearn, RN   Department:  [facility]           INR as of 7/17/2018     Today's INR 2.7      Anticoagulation Summary as of 7/17/2018     INR goal 2.0-3.0   Today's INR 2.7   Full warfarin instructions 5 mg on Mon, Fri; 7.5 mg all other days   Next INR check 7/31/2018    Indications   Atrial fibrillation (H) [I48.91] [I48.91]  Long-term (current) use of anticoagulants [Z79.01] [Z79.01]         July 2018 Details    Sun Mon Tue Wed Thu Fri Sat     1               2               3               4               5               6               7                 8               9               10               11               12               13               14                 15               16               17      7.5 mg   See details      18      7.5 mg         19      7.5 mg         20      5 mg         21      7.5 mg           22      7.5 mg         23      5 mg         24      7.5 mg         25      7.5 mg         26      7.5 mg         27      5 mg         28      7.5 mg           29      7.5 mg         30      5 mg         31                 Date Details   07/17 This INR check       Date of next INR:  7/31/2018         How to take your warfarin dose     To take:  5 mg Take 1 of the 5 mg tablets.    To take:  7.5 mg Take 1.5 of the 5 mg tablets.

## 2018-07-17 NOTE — PROGRESS NOTES
ANTICOAGULATION FOLLOW-UP CLINIC VISIT    Patient Name:  Tad Manning  Date:  7/17/2018  Contact Type:  Telephone    SUBJECTIVE:     Patient Findings     Comments Patient reports that he is starting on Digoxin today.  INR up since last week.            OBJECTIVE    INR Point of Care   Date Value Ref Range Status   07/17/2018 2.7 (H) 0.86 - 1.14 Final     Comment:     This test is intended for monitoring Coumadin therapy.  Results are not   accurate in patients with prolonged INR due to factor deficiency.         ASSESSMENT / PLAN  No question data found.  Anticoagulation Summary as of 7/17/2018     INR goal 2.0-3.0   Today's INR 2.7   Warfarin maintenance plan 5 mg (5 mg x 1) on Mon, Fri; 7.5 mg (5 mg x 1.5) all other days   Full warfarin instructions 5 mg on Mon, Fri; 7.5 mg all other days   Weekly warfarin total 47.5 mg   Plan last modified Vicki Hearn RN (7/17/2018)   Next INR check 7/31/2018   Priority INR   Target end date Indefinite    Indications   Atrial fibrillation (H) [I48.91] [I48.91]  Long-term (current) use of anticoagulants [Z79.01] [Z79.01]         Anticoagulation Episode Summary     INR check location     Preferred lab     Send INR reminders to [facility]    Comments HIPPA Infor returned. OK to leave message on cell [phone]. OK to leave messages with Seferino Manning [phone]  labs @ [facility] faxed there 2/16.  fax: [phone]  Goes by [facility]      Anticoagulation Care Providers     Provider Role Specialty Phone number    Clarisse aVldes MD [facility] Cardiology [phone]            See the Encounter Report to view Anticoagulation Flowsheet and Dosing Calendar (Go to Encounters tab in chart review, and find the Anticoagulation Therapy Visit)    Spoke with Tad.     Vicki Hearn, DARIUSZ

## 2018-07-27 ENCOUNTER — TELEPHONE (OUTPATIENT)
Dept: CALL CENTER | Age: 78
End: 2018-07-27

## 2018-07-27 NOTE — TELEPHONE ENCOUNTER
[facility]: Nurse Triage Note  SITUATION/BACKGROUND                                                      Tad Manning is a 77 year old male who calls with bleeding from his toe-on warfarin and plavix.    Description:  Bleeding from toes that he must have cut yesterday when taking off his compression stockings  Onset/duration:  1 day  Precip. factors: on blood thinners and diabetic  Associated symptoms:  Lightheaded and woozy  Improves/worsens symptoms:    Pain scale (0-10)       MEDICATIONS:   Taking medication(s) as prescribed? No  Help his coumadin yesterday because of bleeding but did take his plavix  Taking over the counter medication(s?) No  Any barriers to taking medication(s) as prescribed?  No  Medication(s) improving/managing symptoms?  No    Allergies:   Allergies   Allergen Reactions     Blood Transfusion Related (Informational Only) Other (See Comments)     Patient has a history of a clinically significant antibody against RBC antigens.  A delay in compatible RBCs may occur.        ASSESSMENT      76 y/o male with a past medical history of CLL in remission, CAD s/p CABG in 1995,HFrEF, atrial flutter, PVD s/p iliac artery stents and left lower extremity bypass Hypertension, diabetes, hyperlipidemia, discitis and multple admission for decompensated ICM/HFrEF.  He calls today because he has a cut on his left foot/toe caused by a toenail when he was removing his compression stockings yesterday.  He is on coumadin and plavix. He held his warfarin last evening but did take his plavix.  The bleeding has not stopped since yesterday and he has the toes wrapped with guaze.  He continues to change it.  He feels lightheaded and a little woozy this morning.  Does not feel his pulse is rapid  Skin is not cold/moist  Although he states his feet are cold but that is his normal. He is at home with his roommate  Advised to come to the ER.  RECOMMENDATION/PLAN                                                       RECOMMENDED DISPOSITION:  To ED, another person to drive - pt has roommate that will drive him but cannot some to the [facility] will go to [facility]   Will comply with recommendation: Yes    If further questions/concerns or if symptoms do not improve, worsen or new symptoms develop, call your PCP or [phone] to talk with the Resident on call, as soon as possible.    Guideline used: bleeding pg 86  Telephone Triage Protocols for Nurses, Fifth Edition, Michelle Luna RN

## 2018-07-31 ENCOUNTER — ANTICOAGULATION THERAPY VISIT (OUTPATIENT)
Dept: ANTICOAGULATION | Facility: CLINIC | Age: 78
End: 2018-07-31

## 2018-07-31 DIAGNOSIS — I48.20 CHRONIC ATRIAL FIBRILLATION (H): ICD-10-CM

## 2018-07-31 DIAGNOSIS — Z79.01 LONG-TERM (CURRENT) USE OF ANTICOAGULANTS: ICD-10-CM

## 2018-07-31 DIAGNOSIS — I48.0 PAROXYSMAL ATRIAL FIBRILLATION (H): ICD-10-CM

## 2018-07-31 LAB — INR BLD: 3 (ref 0.86–1.14)

## 2018-07-31 PROCEDURE — 36416 COLLJ CAPILLARY BLOOD SPEC: CPT | Performed by: INTERNAL MEDICINE

## 2018-07-31 PROCEDURE — 85610 PROTHROMBIN TIME: CPT | Mod: QW | Performed by: INTERNAL MEDICINE

## 2018-07-31 RX ORDER — WARFARIN SODIUM 5 MG/1
TABLET ORAL
Qty: 45 TABLET | Refills: 1 | Status: ON HOLD | OUTPATIENT
Start: 2018-07-31 | End: 2018-09-24

## 2018-07-31 NOTE — MR AVS SNAPSHOT
Tad Tim Manning   7/31/2018   Anticoagulation Therapy Visit    Description:  77 year old male   Provider:  Vicki Hearn, RN   Department:  [facility]           INR as of 7/31/2018     Today's INR 3.0      Anticoagulation Summary as of 7/31/2018     INR goal 2.0-3.0   Today's INR 3.0   Full warfarin instructions 7.5 mg on Mon, Wed, Fri; 5 mg all other days   Next INR check 8/14/2018    Indications   Atrial fibrillation (H) [I48.91] [I48.91]  Long-term (current) use of anticoagulants [Z79.01] [Z79.01]         July 2018 Details    Sun Mon Tue Wed Thu Fri Sat     1               2               3               4               5               6               7                 8               9               10               11               12               13               14                 15               16               17               18               19               20               21                 22               23               24               25               26               27               28                 29               30               31      5 mg   See details           Date Details   07/31 This INR check               How to take your warfarin dose     To take:  5 mg Take 1 of the 5 mg tablets.           August 2018 Details    Sun Mon Tue Wed Thu Fri Sat        1      7.5 mg         2      5 mg         3      7.5 mg         4      5 mg           5      5 mg         6      7.5 mg         7      5 mg         8      7.5 mg         9      5 mg         10      7.5 mg         11      5 mg           12      5 mg         13      7.5 mg         14            15               16               17               18                 19               20               21               22               23               24               25                 26               27               28               29               30               31                 Date Details   No additional  details    Date of next INR:  8/14/2018         How to take your warfarin dose     To take:  5 mg Take 1 of the 5 mg tablets.    To take:  7.5 mg Take 1.5 of the 5 mg tablets.

## 2018-07-31 NOTE — PROGRESS NOTES
ANTICOAGULATION FOLLOW-UP CLINIC VISIT    Patient Name:  Tad Manning  Date:  7/31/2018  Contact Type:  Telephone    SUBJECTIVE:     Patient Findings     Comments Patient reports holding his warfarin on 7/26 for a bleeding toe and INR is still at the top of goal range.           OBJECTIVE    INR Point of Care   Date Value Ref Range Status   07/31/2018 3.0 (H) 0.86 - 1.14 Final     Comment:     This test is intended for monitoring Coumadin therapy.  Results are not   accurate in patients with prolonged INR due to factor deficiency.         ASSESSMENT / PLAN  No question data found.  Anticoagulation Summary as of 7/31/2018     INR goal 2.0-3.0   Today's INR 3.0   Warfarin maintenance plan 7.5 mg (5 mg x 1.5) on Mon, Wed, Fri; 5 mg (5 mg x 1) all other days   Full warfarin instructions 7.5 mg on Mon, Wed, Fri; 5 mg all other days   Weekly warfarin total 42.5 mg   Plan last modified Vicki Hearn RN (7/31/2018)   Next INR check 8/14/2018   Priority INR   Target end date Indefinite    Indications   Atrial fibrillation (H) [I48.91] [I48.91]  Long-term (current) use of anticoagulants [Z79.01] [Z79.01]         Anticoagulation Episode Summary     INR check location     Preferred lab     Send INR reminders to [facility]    Comments HIPPA Infor returned. OK to leave message on cell [phone]. OK to leave messages with Seferino Manning [phone]  labs @ [facility] [place] [place] faxed there 2/16.  fax: [phone]  Goes by [facility]      Anticoagulation Care Providers     Provider Role Specialty Phone number    Clarisse Valdes MD Responsible Cardiology [phone]            See the Encounter Report to view Anticoagulation Flowsheet and Dosing Calendar (Go to Encounters tab in chart review, and find the Anticoagulation Therapy Visit)    Spoke with Tad.     Vicki Hearn, DARIUSZ

## 2018-08-07 RX ORDER — WARFARIN SODIUM 5 MG/1
5 TABLET ORAL DAILY
Qty: 90 TABLET | Refills: 3 | Status: ON HOLD | OUTPATIENT
Start: 2018-08-07 | End: 2018-09-24

## 2018-08-14 ENCOUNTER — ANTICOAGULATION THERAPY VISIT (OUTPATIENT)
Dept: ANTICOAGULATION | Facility: CLINIC | Age: 78
End: 2018-08-14

## 2018-08-14 DIAGNOSIS — I48.20 CHRONIC ATRIAL FIBRILLATION (H): ICD-10-CM

## 2018-08-14 DIAGNOSIS — Z79.01 LONG-TERM (CURRENT) USE OF ANTICOAGULANTS: ICD-10-CM

## 2018-08-14 LAB — INR BLD: 2.9 (ref 0.86–1.14)

## 2018-08-14 PROCEDURE — 36416 COLLJ CAPILLARY BLOOD SPEC: CPT | Performed by: INTERNAL MEDICINE

## 2018-08-14 PROCEDURE — 85610 PROTHROMBIN TIME: CPT | Mod: QW | Performed by: INTERNAL MEDICINE

## 2018-08-14 NOTE — MR AVS SNAPSHOT
Tad Manning   8/14/2018   Anticoagulation Therapy Visit    Description:  77 year old male   Provider:  Chelsea Bland RN   Department:  [facility]           INR as of 8/14/2018     Today's INR 2.9      Anticoagulation Summary as of 8/14/2018     INR goal 2.0-3.0   Today's INR 2.9   Full warfarin instructions 7.5 mg on Mon, Wed, Fri; 5 mg all other days   Next INR check 8/28/2018    Indications   Atrial fibrillation (H) [I48.91] [I48.91]  Long-term (current) use of anticoagulants [Z79.01] [Z79.01]         August 2018 Details    Sun Mon Tue Wed Thu Fri Sat        1               2               3               4                 5               6               7               8               9               10               11                 12               13               14      5 mg   See details      15      7.5 mg         16      5 mg         17      7.5 mg         18      5 mg           19      5 mg         20      7.5 mg         21      5 mg         22      7.5 mg         23      5 mg         24      7.5 mg         25      5 mg           26      5 mg         27      7.5 mg         28            29               30               31                 Date Details   08/14 This INR check       Date of next INR:  8/28/2018         How to take your warfarin dose     To take:  5 mg Take 1 of the 5 mg tablets.    To take:  7.5 mg Take 1.5 of the 5 mg tablets.

## 2018-08-14 NOTE — PROGRESS NOTES
ANTICOAGULATION FOLLOW-UP CLINIC VISIT    Patient Name:  Tad Manning  Date:  8/14/2018  Contact Type:  Telephone    SUBJECTIVE:     Patient Findings     Positives No Problem Findings           OBJECTIVE    INR Point of Care   Date Value Ref Range Status   08/14/2018 2.9 (H) 0.86 - 1.14 Final     Comment:     This test is intended for monitoring Coumadin therapy.  Results are not   accurate in patients with prolonged INR due to factor deficiency.         ASSESSMENT / PLAN  INR assessment THER    Recheck INR In: 2 WEEKS    INR Location Clinic      Anticoagulation Summary as of 8/14/2018     INR goal 2.0-3.0   Today's INR 2.9   Warfarin maintenance plan 7.5 mg (5 mg x 1.5) on Mon, Wed, Fri; 5 mg (5 mg x 1) all other days   Full warfarin instructions 7.5 mg on Mon, Wed, Fri; 5 mg all other days   Weekly warfarin total 42.5 mg   Plan last modified Vicki Hearn RN (7/31/2018)   Next INR check 8/28/2018   Priority INR   Target end date Indefinite    Indications   Atrial fibrillation (H) [I48.91] [I48.91]  Long-term (current) use of anticoagulants [Z79.01] [Z79.01]         Anticoagulation Episode Summary     INR check location     Preferred lab     Send INR reminders to [facility]    Comments HIPPA Infor returned. OK to leave message on cell [phone]. OK to leave messages with Seferino Manning [phone]  labs @ [facility] faxed there 2/16.  fax: [phone]  Goes by [employer]      Anticoagulation Care Providers     Provider Role Specialty Phone number    Clarisse Valdes MD [facility] Cardiology [phone]            See the Encounter Report to view Anticoagulation Flowsheet and Dosing Calendar (Go to Encounters tab in chart review, and find the Anticoagulation Therapy Visit)    Spoke with patient.    Chelsea Bland RN                 ANTICOAGULATION FOLLOW-UP CLINIC VISIT    Patient Name:  Tad Manning  Date:  8/14/2018  Contact Type:  Telephone    SUBJECTIVE:     Patient  Findings     Positives No Problem Findings           OBJECTIVE    INR Point of Care   Date Value Ref Range Status   08/14/2018 2.9 (H) 0.86 - 1.14 Final     Comment:     This test is intended for monitoring Coumadin therapy.  Results are not   accurate in patients with prolonged INR due to factor deficiency.         ASSESSMENT / PLAN  INR assessment THER    Recheck INR In: 2 WEEKS    INR Location Clinic      Anticoagulation Summary as of 8/14/2018     INR goal 2.0-3.0   Today's INR 2.9   Warfarin maintenance plan 7.5 mg (5 mg x 1.5) on Mon, Wed, Fri; 5 mg (5 mg x 1) all other days   Full warfarin instructions 7.5 mg on Mon, Wed, Fri; 5 mg all other days   Weekly warfarin total 42.5 mg   Plan last modified Vicki Hearn RN (7/31/2018)   Next INR check 8/28/2018   Priority INR   Target end date Indefinite    Indications   Atrial fibrillation (H) [I48.91] [I48.91]  Long-term (current) use of anticoagulants [Z79.01] [Z79.01]         Anticoagulation Episode Summary     INR check location     Preferred lab     Send INR reminders to [facility]    Comments HIPPA Infor returned. OK to leave message on cell [phone]. OK to leave messages with Seferino Manning [phone]  labs @ [facility] [place] faxed there 2/16.  fax: [phone]  Goes by [employer]      Anticoagulation Care Providers     Provider Role Specialty Phone number    Clarisse Valdes MD Responsible Cardiology [phone]            See the Encounter Report to view Anticoagulation Flowsheet and Dosing Calendar (Go to Encounters tab in chart review, and find the Anticoagulation Therapy Visit)    Spoke with patient.    Chelsea Bland RN

## 2018-08-28 ENCOUNTER — ANTICOAGULATION THERAPY VISIT (OUTPATIENT)
Dept: ANTICOAGULATION | Facility: CLINIC | Age: 78
End: 2018-08-28

## 2018-08-28 DIAGNOSIS — I48.20 CHRONIC ATRIAL FIBRILLATION (H): ICD-10-CM

## 2018-08-28 DIAGNOSIS — Z79.01 LONG-TERM (CURRENT) USE OF ANTICOAGULANTS: ICD-10-CM

## 2018-08-28 LAB — INR BLD: 2.2 (ref 0.86–1.14)

## 2018-08-28 PROCEDURE — 85610 PROTHROMBIN TIME: CPT | Mod: QW | Performed by: INTERNAL MEDICINE

## 2018-08-28 PROCEDURE — 36416 COLLJ CAPILLARY BLOOD SPEC: CPT | Performed by: INTERNAL MEDICINE

## 2018-08-28 NOTE — MR AVS SNAPSHOT
Tad Dumont Nigel   8/28/2018   Anticoagulation Therapy Visit    Description:  77 year old male   Provider:  Darien Ballard, RN   Department:  [facility]           INR as of 8/28/2018     Today's INR 2.2      Anticoagulation Summary as of 8/28/2018     INR goal 2.0-3.0   Today's INR 2.2   Full warfarin instructions 7.5 mg on Mon, Wed, Fri; 5 mg all other days   Next INR check 9/11/2018    Indications   Atrial fibrillation (H) [I48.91] [I48.91]  Long-term (current) use of anticoagulants [Z79.01] [Z79.01]         August 2018 Details    Sun Mon Tue Wed Thu Fri Sat        1               2               3               4                 5               6               7               8               9               10               11                 12               13               14               15               16               17               18                 19               20               21               22               23               24               25                 26               27               28      5 mg   See details      29      7.5 mg         30      5 mg         31      7.5 mg           Date Details   08/28 This INR check               How to take your warfarin dose     To take:  5 mg Take 1 of the 5 mg tablets.    To take:  7.5 mg Take 1.5 of the 5 mg tablets.           September 2018 Details    Sun Mon Tue Wed Thu Fri Sat           1      5 mg           2      5 mg         3      7.5 mg         4      5 mg         5      7.5 mg         6      5 mg         7      7.5 mg         8      5 mg           9      5 mg         10      7.5 mg         11            12               13               14               15                 16               17               18               19               20               21               22                 23               24               25               26               27               28               29                 30                       Date Details   No additional details    Date of next INR:  9/11/2018         How to take your warfarin dose     To take:  5 mg Take 1 of the 5 mg tablets.    To take:  7.5 mg Take 1.5 of the 5 mg tablets.

## 2018-08-28 NOTE — PROGRESS NOTES
ANTICOAGULATION FOLLOW-UP CLINIC VISIT    Patient Name:  Tad Manning  Date:  8/28/2018  Contact Type:  Telephone    SUBJECTIVE:     Patient Findings     Positives No Problem Findings           OBJECTIVE    INR Point of Care   Date Value Ref Range Status   08/28/2018 2.2 (H) 0.86 - 1.14 Final     Comment:     This test is intended for monitoring Coumadin therapy.  Results are not   accurate in patients with prolonged INR due to factor deficiency.         ASSESSMENT / PLAN  INR assessment THER    Recheck INR In: 2 WEEKS    INR Location Clinic      Anticoagulation Summary as of 8/28/2018     INR goal 2.0-3.0   Today's INR 2.2   Warfarin maintenance plan 7.5 mg (5 mg x 1.5) on Mon, Wed, Fri; 5 mg (5 mg x 1) all other days   Full warfarin instructions 7.5 mg on Mon, Wed, Fri; 5 mg all other days   Weekly warfarin total 42.5 mg   No change documented Darien Ballard RN   Plan last modified Vicki Hearn RN (7/31/2018)   Next INR check 9/11/2018   Priority INR   Target end date Indefinite    Indications   Atrial fibrillation (H) [I48.91] [I48.91]  Long-term (current) use of anticoagulants [Z79.01] [Z79.01]         Anticoagulation Episode Summary     INR check location     Preferred lab     Send INR reminders to [facility]    Comments HIPPA Infor returned. OK to leave message on cell [phone]. OK to leave messages with Sefeirno Manning [phone]  labs @ [facility] [place] faxed there 2/16.  fax: [phone]  Goes by [employer]      Anticoagulation Care Providers     Provider Role Specialty Phone number    Clarisse Valdes MD Responsible Cardiology [phone]            See the Encounter Report to view Anticoagulation Flowsheet and Dosing Calendar (Go to Encounters tab in chart review, and find the Anticoagulation Therapy Visit)    Left message for patient with results and dosing recommendations. Asked patient to call back to report any missed doses, falls, signs and symptoms of bleeding or  clotting, any changes in health, medication, or diet. Asked patient to call back with any questions or concerns.    Darien Ballard RN

## 2018-09-11 ENCOUNTER — ANTICOAGULATION THERAPY VISIT (OUTPATIENT)
Dept: ANTICOAGULATION | Facility: CLINIC | Age: 78
End: 2018-09-11

## 2018-09-11 DIAGNOSIS — I48.20 CHRONIC ATRIAL FIBRILLATION (H): ICD-10-CM

## 2018-09-11 DIAGNOSIS — Z79.01 LONG-TERM (CURRENT) USE OF ANTICOAGULANTS: ICD-10-CM

## 2018-09-11 LAB — INR BLD: 4.5 (ref 0.86–1.14)

## 2018-09-11 PROCEDURE — 85610 PROTHROMBIN TIME: CPT | Mod: QW | Performed by: INTERNAL MEDICINE

## 2018-09-11 PROCEDURE — 36416 COLLJ CAPILLARY BLOOD SPEC: CPT | Performed by: INTERNAL MEDICINE

## 2018-09-11 NOTE — PROGRESS NOTES
ANTICOAGULATION FOLLOW-UP CLINIC VISIT    Patient Name:  Tad Manning  Date:  9/11/2018  Contact Type:  Telephone    SUBJECTIVE:     Patient Findings     Positives Unexplained INR or factor level change    Comments Pt cannot think of anything that could have increased his INR, but possibly doubled up a dose or decreased fluids. This INR was done via fingerstick            OBJECTIVE    INR Point of Care   Date Value Ref Range Status   09/11/2018 4.5 (H) 0.86 - 1.14 Final     Comment:     This test is intended for monitoring Coumadin therapy.  Results are not   accurate in patients with prolonged INR due to factor deficiency.         ASSESSMENT / PLAN  INR assessment SUPRA    Recheck INR In: 3 DAYS    INR Location Clinic      Anticoagulation Summary as of 9/11/2018     INR goal 2.0-3.0   Today's INR 4.5!   Warfarin maintenance plan 7.5 mg (5 mg x 1.5) on Mon, Wed, Fri; 5 mg (5 mg x 1) all other days   Full warfarin instructions 9/11: Hold; Otherwise 7.5 mg on Mon, Wed, Fri; 5 mg all other days   Weekly warfarin total 42.5 mg   Plan last modified Vicki Hearn RN (7/31/2018)   Next INR check 9/14/2018   Priority INR   Target end date Indefinite    Indications   Atrial fibrillation (H) [I48.91] [I48.91]  Long-term (current) use of anticoagulants [Z79.01] [Z79.01]         Anticoagulation Episode Summary     INR check location     Preferred lab     Send INR reminders to [facility]    Comments HIPPA Infor returned. OK to leave message on cell [phone]. OK to leave messages with Seferino Manning [phone]  labs @ [facility] [place] faxed there 2/16.  fax: [phone]  Goes by [employer]      Anticoagulation Care Providers     Provider Role Specialty Phone number    Clarisse Valdes MD Responsible Cardiology [phone]            See the Encounter Report to view Anticoagulation Flowsheet and Dosing Calendar (Go to Encounters tab in chart review, and find the Anticoagulation Therapy Visit)    Spoke  with patient. Gave them their lab results and new warfarin recommendation.  No changes in health, medication, or diet. No missed doses, no falls. No signs or symptoms of bleed or clotting.     Ellen Santizo RN

## 2018-09-11 NOTE — MR AVS SNAPSHOT
Tad Tim Manning   9/11/2018   Anticoagulation Therapy Visit    Description:  77 year old male   Provider:  Ellen Santizo, RN   Department:  [facility]           INR as of 9/11/2018     Today's INR 4.5!      Anticoagulation Summary as of 9/11/2018     INR goal 2.0-3.0   Today's INR 4.5!   Full warfarin instructions 9/11: Hold; Otherwise 7.5 mg on Mon, Wed, Fri; 5 mg all other days   Next INR check 9/14/2018    Indications   Atrial fibrillation (H) [I48.91] [I48.91]  Long-term (current) use of anticoagulants [Z79.01] [Z79.01]         September 2018 Details    Sun Mon Tue Wed Thu Fri Sat           1                 2               3               4               5               6               7               8                 9               10               11      Hold   See details      12      7.5 mg         13      5 mg         14            15                 16               17               18               19               20               21               22                 23               24               25               26               27               28               29                 30                      Date Details   09/11 This INR check       Date of next INR:  9/14/2018         How to take your warfarin dose     To take:  5 mg Take 1 of the 5 mg tablets.    To take:  7.5 mg Take 1.5 of the 5 mg tablets.    Hold Do not take your warfarin dose. See the Details table to the right for additional instructions.

## 2018-09-14 ENCOUNTER — ANTICOAGULATION THERAPY VISIT (OUTPATIENT)
Dept: ANTICOAGULATION | Facility: CLINIC | Age: 78
End: 2018-09-14

## 2018-09-14 DIAGNOSIS — I48.20 CHRONIC ATRIAL FIBRILLATION (H): ICD-10-CM

## 2018-09-14 DIAGNOSIS — Z79.01 LONG-TERM (CURRENT) USE OF ANTICOAGULANTS: ICD-10-CM

## 2018-09-14 LAB — INR BLD: 2.9 (ref 0.86–1.14)

## 2018-09-14 PROCEDURE — 36416 COLLJ CAPILLARY BLOOD SPEC: CPT | Performed by: INTERNAL MEDICINE

## 2018-09-14 PROCEDURE — 85610 PROTHROMBIN TIME: CPT | Mod: QW | Performed by: INTERNAL MEDICINE

## 2018-09-14 NOTE — MR AVS SNAPSHOT
Tad Tim Manning   9/14/2018   Anticoagulation Therapy Visit    Description:  77 year old male   Provider:  Chelsea Bland, RN   Department:  [facility]           INR as of 9/14/2018     Today's INR 2.9      Anticoagulation Summary as of 9/14/2018     INR goal 2.0-3.0   Today's INR 2.9   Full warfarin instructions 7.5 mg on Mon, Wed, Fri; 5 mg all other days   Next INR check 10/2/2018    Indications   Atrial fibrillation (H) [I48.91] [I48.91]  Long-term (current) use of anticoagulants [Z79.01] [Z79.01]         September 2018 Details    Sun Mon Tue Wed Thu Fri Sat           1                 2               3               4               5               6               7               8                 9               10               11               12               13               14      7.5 mg   See details      15      5 mg           16      5 mg         17      7.5 mg         18      5 mg         19      7.5 mg         20      5 mg         21      7.5 mg         22      5 mg           23      5 mg         24      7.5 mg         25      5 mg         26      7.5 mg         27      5 mg         28      7.5 mg         29      5 mg           30      5 mg                Date Details   09/14 This INR check               How to take your warfarin dose     To take:  5 mg Take 1 of the 5 mg tablets.    To take:  7.5 mg Take 1.5 of the 5 mg tablets.           October 2018 Details    Sun Mon Tue Wed Thu Fri Sat      1      7.5 mg         2            3               4               5               6                 7               8               9               10               11               12               13                 14               15               16               17               18               19               20                 21               22               23               24               25               26               27                 28               29                30               31                   Date Details   No additional details    Date of next INR:  10/2/2018         How to take your warfarin dose     To take:  5 mg Take 1 of the 5 mg tablets.    To take:  7.5 mg Take 1.5 of the 5 mg tablets.

## 2018-09-14 NOTE — PROGRESS NOTES
ANTICOAGULATION FOLLOW-UP CLINIC VISIT    Patient Name:  Tad Manning  Date:  9/14/2018  Contact Type:  Telephone    SUBJECTIVE:     Patient Findings     Positives Bruising (ongoing bruises on arms, and one on left shoulder.)           OBJECTIVE    INR Point of Care   Date Value Ref Range Status   09/14/2018 2.9 (H) 0.86 - 1.14 Final     Comment:     This test is intended for monitoring Coumadin therapy.  Results are not   accurate in patients with prolonged INR due to factor deficiency.         ASSESSMENT / PLAN  INR assessment THER    Recheck INR In: 2 WEEKS    INR Location Clinic      Anticoagulation Summary as of 9/14/2018     INR goal 2.0-3.0   Today's INR 2.9   Warfarin maintenance plan 7.5 mg (5 mg x 1.5) on Mon, Wed, Fri; 5 mg (5 mg x 1) all other days   Full warfarin instructions 7.5 mg on Mon, Wed, Fri; 5 mg all other days   Weekly warfarin total 42.5 mg   Plan last modified Vicki Hearn RN (7/31/2018)   Next INR check 10/2/2018   Priority INR   Target end date Indefinite    Indications   Atrial fibrillation (H) [I48.91] [I48.91]  Long-term (current) use of anticoagulants [Z79.01] [Z79.01]         Anticoagulation Episode Summary     INR check location     Preferred lab     Send INR reminders to [facility]    Comments HIPPA Infor returned. OK to leave message on cell [phone]. OK to leave messages with Seferino Manning [phone]  labs @ [facility] faxed there 2/16.  fax: [phone]  Goes by [employer]      Anticoagulation Care Providers     Provider Role Specialty Phone number    Clarisse Valdes MD Responsible Cardiology [phone]            See the Encounter Report to view Anticoagulation Flowsheet and Dosing Calendar (Go to Encounters tab in chart review, and find the Anticoagulation Therapy Visit)    Spoke with patient.    Chelsea Bland RN

## 2018-09-21 ENCOUNTER — HOSPITAL ENCOUNTER (INPATIENT)
Facility: CLINIC | Age: 78
LOS: 2 days | Discharge: HOME HEALTH | DRG: 683 | End: 2018-09-24
Attending: EMERGENCY MEDICINE | Admitting: INTERNAL MEDICINE
Payer: MEDICARE

## 2018-09-21 DIAGNOSIS — Z79.4 ENCOUNTER FOR LONG-TERM (CURRENT) USE OF INSULIN (H): ICD-10-CM

## 2018-09-21 DIAGNOSIS — I50.22 CHRONIC SYSTOLIC HEART FAILURE (H): ICD-10-CM

## 2018-09-21 DIAGNOSIS — Z79.01 LONG-TERM (CURRENT) USE OF ANTICOAGULANTS: ICD-10-CM

## 2018-09-21 DIAGNOSIS — E11.42 DIABETIC POLYNEUROPATHY ASSOCIATED WITH TYPE 2 DIABETES MELLITUS (H): ICD-10-CM

## 2018-09-21 DIAGNOSIS — I48.20 CHRONIC ATRIAL FIBRILLATION (H): ICD-10-CM

## 2018-09-21 DIAGNOSIS — C91.10 CHRONIC LYMPHOID LEUKEMIA, WITHOUT MENTION OF HAVING ACHIEVED REMISSION(204.10) (H): ICD-10-CM

## 2018-09-21 DIAGNOSIS — N17.9 AKI (ACUTE KIDNEY INJURY) (H): ICD-10-CM

## 2018-09-21 PROCEDURE — 99284 EMERGENCY DEPT VISIT MOD MDM: CPT | Mod: 25 | Performed by: EMERGENCY MEDICINE

## 2018-09-21 PROCEDURE — 99285 EMERGENCY DEPT VISIT HI MDM: CPT | Mod: 25 | Performed by: EMERGENCY MEDICINE

## 2018-09-21 PROCEDURE — 76775 US EXAM ABDO BACK WALL LIM: CPT | Mod: 26 | Performed by: EMERGENCY MEDICINE

## 2018-09-21 PROCEDURE — 76775 US EXAM ABDO BACK WALL LIM: CPT | Performed by: EMERGENCY MEDICINE

## 2018-09-21 PROCEDURE — 96360 HYDRATION IV INFUSION INIT: CPT | Performed by: EMERGENCY MEDICINE

## 2018-09-21 NOTE — IP AVS SNAPSHOT
[address] [address]    Phone:  [phone]                                       After Visit Summary   9/21/2018    Tad Manning    MRN: [id number]           After Visit Summary Signature Page     I have received my discharge instructions, and my questions have been answered. I have discussed any challenges I see with this plan with the nurse or doctor.    ..........................................................................................................................................  Patient/Patient Representative Signature      ..........................................................................................................................................  Patient Representative Print Name and Relationship to Patient    ..................................................               ................................................  Date                                   Time    ..........................................................................................................................................  Reviewed by Signature/Title    ...................................................              ..............................................  Date                                               Time          22EPIC Rev 08/18

## 2018-09-21 NOTE — IP AVS SNAPSHOT
MRN:[id number]                      After Visit Summary   9/21/2018    Tad Manning    MRN: [id number]           Thank you!     Thank you for choosing [facility] for your care. Our goal is always to provide you with excellent care. Hearing back from our patients is one way we can continue to improve our services. Please take a few minutes to complete the written survey that you may receive in the mail after you visit with us. Thank you!        Patient Information     Date Of Birth          1940        Designated Caregiver       Most Recent Value    Caregiver    Will someone help with your care after discharge? yes    Name of designated caregiver  Teresa    Phone number of caregiver     Caregiver address [address], [facility]. [address]      About your hospital stay     You were admitted on:  September 22, 2018 You last received care in the:  [address] [facility] [facility]    You were discharged on:  September 24, 2018        Reason for your hospital stay       You were hospitalized due to a decline in your kidney function. This was likely due to dehydration and over diuresing with your cardiac medications. Your kidney function was at your baseline when you discharged. Some of your medications were held on discharge. Please be cautions of these medications changes.                  Who to Call     For medical emergencies, please call 911.  For non-urgent questions about your medical care, please call your primary care provider or clinic, [phone]          Attending Provider     Provider Specialty    Jude Chirinos MD Emergency Medicine    Ronald Gomez MD Internal Medicine    Eva Morrissey MD Internal Medicine    Jack, Jeffry Uriostegui MD Internal Medicine       Primary Care Provider Office Phone # Fax #    Gene Guevara [phone]       When to contact your care team       Call your primary doctor if you have any of the  following:  increased shortness of breath, increased swelling or weight change of > 2 lbs in one day or >5 lbs in one week.                  After Care Instructions     Activity       Your activity upon discharge: activity as tolerated, home PT and OT            Diet       Follow this diet upon discharge:       Moderate Consistent CHO Diet            Monitor and record       weight every day. Please contact your cardiologist if your weight changes >2 lbs in one day or >5 lbs in one week.            Wound care and dressings       Instructions to care for your wound at home: daily dressing changes.    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  Apply dab of iodosorb to wound bed and cover with small primipore.            Wound care and dressings       Instructions to care for your wound at home: Right foot wound: Daily cleanse with microklenz and pat dry.  Apply dab of iodosorb (order # [id number]) to wound bed and cover with small primipore.                  Follow-up Appointments     Follow Up and recommended labs and tests       Follow up with primary care provider, Gene Guevara, on Wednesday, Sep 26, to evaluate medication change, for hospital follow- up and to follow up on results. The following labs/tests are recommended: BMP, INR.                  Your next 10 appointments already scheduled     Sep 27, 2018 12:30 PM CDT   Lab with  LAB   [facility] [place])    [address] [place] [address] Se  1st Floor  [facility] [address] [phone]            Sep 27, 2018  1:00 PM CDT   (Arrive by 12:45 PM)   CORE NEW with Jess Chi NP   [facility] ([facility] [place])    [address] [place] [address]  [address]  [facility] [address]   [phone]            Oct 02, 2018  9:50 AM CDT   LAB with LAB FIRST FLOOR [facility] ([facility])    [address] [place] [address]  N  [facility] [address]   [phone]           Please do not eat 10-12 hours before your appointment if you are coming in fasting for labs on lipids, cholesterol, or glucose (sugar). This does not apply to pregnant women. Water, hot tea and black coffee (with nothing added) are okay. Do not drink other fluids, diet soda or chew gum.            Oct 22, 2018  1:00 PM CDT   (Arrive by 12:45 PM)   Return Visit with Clarisse Valdes MD   [facility] ([facility])    [address] [facility]  [address] [place] [address] [phone]              Additional Services     Home care nursing referral       [address] [address]  Phone: [phone]  Fax: [phone]    RN skilled nursing visit.   RN to assess vital signs and weight, respiratory and cardiac status, hydration, nutrition and bowel status  RN to complete home safety eval  RN to assess and teach medication management, setup and storage.  RN to provide wound assessment and care every visit (see wound care orders). RN to contact PCP if increased sign/symptoms of infection noted.  RN to please INR on Wednesday 9/26. Results to be called into [facility] Phone: [phone]    PT to eval and treat  OT to eval and treat    Your provider has ordered home care nursing services.   If you have not been contacted within 2 days of your discharge please call            INR Clinic Referral       [facility]  Phone: [phone]  Fax: [phone]    For INR and Warfarin monitoring (Goal =2-3)  Indication for Anticoagulation: Afib  MD Following: Dr. Valdes (Cardiology)  Expected duration of therapy: indefinate  Next INR lab draw due on: Wed 9/26      Coumadin Clinic Annual Visit Requirement for Medicare    Condition requiring Warfarin: Atrial fibrillation  Reason for patient to be following in the Coumadin Clinic: To provide consistency and accuracy  "in anticoagulation managment by pharmacy experts.  Special instructions: Specific instructions including INR ranges, dosages and follow up instructions are included in the most recent consult order.  Next time this patient requires a face-to-face visit with a provider: In one year.                  Warfarin Instruction     You have started taking a medicine called warfarin. This is a blood-thinning medicine (anticoagulant). It helps prevent and treat blood clots.      Before leaving the hospital, make sure you know how much to take and how long to take it.      You will need regular blood tests to make sure your blood is clotting safely. It is very important to see your doctor for regular blood tests.    Talk to your doctor before taking any new medicine (this includes over-the-counter drugs and herbal products). Many medicines can interact with warfarin. This may cause more bleeding or too much clotting.     Eating a lot of vitamin K--found in green, leafy vegetables--can change the way warfarin works in your body. Do NOT avoid these foods. Instead, try to eat the same amount each day.     Bleeding is the most common side-effect of warfarin. You may notice bleeding gums, a bloody nose, bruises and bleeding longer when you cut yourself. See a doctor at once if:   o You cough up blood  o You find blood in your stool (poop)  o You have a deep cut, or a cut that bleeds longer than 10 minutes   o You have a bad cut, hard fall, accident or hit your head (go to urgent care or the emergency room).    For women who can get pregnant: This medicine can harm an unborn baby. Be very careful not to get pregnant while taking this medicine. If you think you might be pregnant, call your doctor right away.    For more information, read \"Guide to Warfarin Therapy,  the booklet you received in the hospital.        Pending Results     No orders found from 9/19/2018 to 9/22/2018.            Statement of Approval     Ordered          " 09/24/18 8068  I have reviewed and agree with all the recommendations and orders detailed in this document.  EFFECTIVE NOW     Approved and electronically signed by:  Fatou Maldonado PA             Admission Information     Date & Time Provider Department Dept. Phone    9/21/2018 Jeffry Santiago MD [address] [facility] [phone]      Your Vitals Were     Blood Pressure Pulse Temperature Respirations Height Weight    127/52 (BP Location: Right arm) 58 97.6  F (36.4  C) (Oral) 16 1.829 m (6') 82 kg (180 lb 12.8 oz)    Pulse Oximetry BMI (Body Mass Index)                98% 24.52 kg/m2          MyChart Information     [facility] gives you secure access to your electronic health record. If you see a primary care provider, you can also send messages to your care team and make appointments. If you have questions, please call your primary care clinic.  If you do not have a primary care provider, please call [phone] and they will assist you.        Care EveryWhere ID     This is your Care EveryWhere ID. This could be used by other organizations to access your [facility] medical records  [id number]        Equal Access to Services     STUART ELLISON : Hadii liana linton Sogin, waaxda luasiaadaha, qaybta kaalmada raymundo, deandre del cid. So [facility] [phone].    ATENCIÓN: Si habla español, tiene a noe disposición servicios gratuitos de asistencia lingüística. Joey al [phone].    We comply with applicable federal civil rights laws and [place] laws. We do not discriminate on the basis of race, color, national origin, age, disability, sex, sexual orientation, or gender identity.               Review of your medicines      CONTINUE these medicines which may have CHANGED, or have new prescriptions. If we are uncertain of the size of tablets/capsules you have at home, strength may be listed as something that might have changed.        Dose / Directions    furosemide 20 MG tablet    Commonly known as:  LASIX   This may have changed:  how much to take   Used for:  Chronic systolic heart failure (H)        Dose:  40 mg   Take 2 tablets (40 mg) by mouth 2 times daily   Quantity:  180 tablet   Refills:  3       warfarin 5 MG tablet   Commonly known as:  COUMADIN   This may have changed:    - additional instructions  - Another medication with the same name was removed. Continue taking this medication, and follow the directions you see here.   Used for:  Chronic atrial fibrillation (H), Long-term (current) use of anticoagulants        Take 0.5 tablets tonight 9/24 and 1 tab tomorrow night 9/25. INR check on Wednesday 9/26 and continue as directed by coumadin clinic.   Quantity:  45 tablet   Refills:  1         CONTINUE these medicines which have NOT CHANGED        Dose / Directions    acetaminophen 325 MG tablet   Commonly known as:  TYLENOL        Dose:  325 mg   Take 325 mg by mouth every 4 hours as needed for mild pain   Refills:  0       atorvastatin 20 MG tablet   Commonly known as:  LIPITOR   Used for:  Claudication of left lower extremity (H)        TAKE 1 TABLET BY MOUTH EVERY DAY   Quantity:  90 tablet   Refills:  3       CELEXA PO        Dose:  10 mg   Take 10 mg by mouth daily   Refills:  0       cholecalciferol 1000 units Tabs   Used for:  Loop diuretic causing adverse effect in therapeutic use, subsequent encounter        Dose:  1000 Units   Take 1,000 Units by mouth daily   Quantity:  30 tablet   Refills:  0       clopidogrel 75 MG tablet   Commonly known as:  PLAVIX   Used for:  Status post percutaneous transluminal coronary angioplasty, Coronary artery disease involving native coronary artery of native heart without angina pectoris        Dose:  75 mg   Take 1 tablet (75 mg) by mouth daily   Quantity:  90 tablet   Refills:  3       digoxin 125 MCG tablet   Commonly known as:  LANOXIN   Used for:  Chronic systolic congestive heart failure (H)        Dose:  125 mcg   Take 1 tablet  (125 mcg) by mouth daily   Quantity:  90 tablet   Refills:  3       MELATONIN PO        Dose:  5 mg   Take 5 mg by mouth daily   Refills:  0       metFORMIN 500 MG tablet   Commonly known as:  GLUCOPHAGE   Used for:  Type 2 diabetes mellitus with diabetic polyneuropathy, without long-term current use of insulin (H)        Dose:  500 mg   Take 1 tablet (500 mg) by mouth 2 times daily (with meals)   Quantity:  60 tablet   Refills:  0       metoprolol succinate 200 MG 24 hr tablet   Commonly known as:  TOPROL-XL   Used for:  Chronic systolic heart failure (H)        Dose:  100 mg   Take 0.5 tablets (100 mg) by mouth daily   Quantity:  90 tablet   Refills:  3       nitroGLYcerin 0.4 MG sublingual tablet   Commonly known as:  NITROSTAT   Used for:  Atrial flutter, unspecified type (H)        For chest pain place 1 tablet under the tongue every 5 minutes for 3 doses. If symptoms persist 5 minutes after 1st dose call 911.   Quantity:  25 tablet   Refills:  0       nystatin cream   Commonly known as:  MYCOSTATIN        Apply topically 2 times daily   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), Diabetic ulcer of left foot due to type 2 diabetes mellitus (H)        Equipment being ordered: DH2 shoe   Quantity:  1 Device   Refills:  0       ranitidine 150 MG tablet   Commonly known as:  ZANTAC   Used for:  Gastroesophageal reflux disease without esophagitis        Dose:  150 mg   Take 1 tablet (150 mg) by mouth 2 times daily   Quantity:  60 tablet   Refills:  0       sodium chloride 0.65 % nasal spray   Commonly known as:  EQ SALINE NASAL SPRAY        Dose:  1 spray   Spray 1 spray into both nostrils daily as needed for congestion   Quantity:  1 Bottle   Refills:  1         STOP taking     ALPRAZolam 0.25 MG tablet   Commonly known as:  XANAX           lisinopril 10 MG tablet   Commonly known as:  PRINIVIL/ZESTRIL           spironolactone 25 MG tablet   Commonly known as:   ALDACTONE           tamsulosin 0.4 MG capsule   Commonly known as:  FLOMAX                Where to get your medicines      Some of these will need a paper prescription and others can be bought over the counter. Ask your nurse if you have questions.     You don't need a prescription for these medications     furosemide 20 MG tablet    warfarin 5 MG tablet                Protect others around you: Learn how to safely use, store and throw away your medicines at www.disposemymeds.org.             Medication List: This is a list of all your medications and when to take them. Check marks below indicate your daily home schedule. Keep this list as a reference.      Medications           Morning Afternoon Evening Bedtime As Needed    acetaminophen 325 MG tablet   Commonly known as:  TYLENOL   Take 325 mg by mouth every 4 hours as needed for mild pain                                atorvastatin 20 MG tablet   Commonly known as:  LIPITOR   TAKE 1 TABLET BY MOUTH EVERY DAY   Last time this was given:  20 mg on 9/23/2018  9:10 PM                                CELEXA PO   Take 10 mg by mouth daily   Last time this was given:  10 mg on 9/24/2018  8:26 AM                                cholecalciferol 1000 units Tabs   Take 1,000 Units by mouth daily   Last time this was given:  1,000 Units on 9/24/2018  8:26 AM                                clopidogrel 75 MG tablet   Commonly known as:  PLAVIX   Take 1 tablet (75 mg) by mouth daily   Last time this was given:  75 mg on 9/24/2018  8:26 AM                                digoxin 125 MCG tablet   Commonly known as:  LANOXIN   Take 1 tablet (125 mcg) by mouth daily                                furosemide 20 MG tablet   Commonly known as:  LASIX   Take 2 tablets (40 mg) by mouth 2 times daily                                MELATONIN PO   Take 5 mg by mouth daily   Last time this was given:  5 mg on 9/24/2018  2:06 AM                                metFORMIN 500 MG tablet   Commonly  known as:  GLUCOPHAGE   Take 1 tablet (500 mg) by mouth 2 times daily (with meals)                                metoprolol succinate 200 MG 24 hr tablet   Commonly known as:  TOPROL-XL   Take 0.5 tablets (100 mg) by mouth daily   Last time this was given:  100 mg on 9/24/2018  8:26 AM                                nitroGLYcerin 0.4 MG sublingual tablet   Commonly known as:  NITROSTAT   For chest pain place 1 tablet under the tongue every 5 minutes for 3 doses. If symptoms persist 5 minutes after 1st dose call 911.                                nystatin cream   Commonly known as:  MYCOSTATIN   Apply topically 2 times daily                                order for DME   Equipment being ordered: DH2 shoe                                ranitidine 150 MG tablet   Commonly known as:  ZANTAC   Take 1 tablet (150 mg) by mouth 2 times daily   Last time this was given:  150 mg on 9/24/2018  8:26 AM                                sodium chloride 0.65 % nasal spray   Commonly known as:  EQ SALINE NASAL SPRAY   Spray 1 spray into both nostrils daily as needed for congestion                                warfarin 5 MG tablet   Commonly known as:  COUMADIN   Take 0.5 tablets tonight 9/24 and 1 tab tomorrow night 9/25. INR check on Wednesday 9/26 and continue as directed by coumadin clinic.   Last time this was given:  2 mg on 9/23/2018  5:11 PM

## 2018-09-21 NOTE — LETTER
Transition Communication Hand-off for Care Transitions to Next Level of Care Provider    Name: Tad Manning  : 1940  MRN #: [id number]  Primary Care Provider: Gene Guevara     Primary Clinic: [address] [address]     Reason for Hospitalization:  STORM (acute kidney injury) (H) [N17.9]    Admit Date/Time: 2018 11:50 PM  Discharge Date: 2018    Payor Source: Payor: MEDICARE / Plan: MEDICARE / Product Type: Medicare /     Follow-up plan:  Future Appointments  Date Time Provider Department Center   2018 12:30 PM UC LAB [facility]   2018 1:00 PM Jess Cih NP [facility]   10/2/2018 9:50 AM LAB FIRST FLOOR [facility]   10/22/2018 1:00 PM Clarisse Valdes MD [facility]       Any outstanding tests or procedures:        Referrals     Future Labs/Procedures    Home care nursing referral     Comments:    [address] [address]  Phone: [phone]  Fax: [phone]    RN skilled nursing visit.   RN to assess vital signs and weight, respiratory and cardiac status, hydration, nutrition and bowel status  RN to complete home safety eval  RN to assess and teach medication management, setup and storage.  RN to provide wound assessment and care every visit (see wound care orders). RN to contact PCP if increased sign/symptoms of infection noted.  RN to please INR on . Results to be called into [facility] Phone: [phone]    PT to eval and treat  OT to eval and treat    Your provider has ordered home care nursing services.   If you have not been contacted within 2 days of your discharge please call    INR Clinic Referral     Comments:    [facility]  Phone: [phone]  Fax: [phone]    For INR and Warfarin monitoring (Goal =2-3)  Indication for Anticoagulation: Afib  MD Following: Dr. Valdes (Cardiology)  Expected duration  of therapy: indefinate  Next INR lab draw due on: Wed 9/26      Coumadin Clinic Annual Visit Requirement for Medicare    Condition requiring Warfarin: Atrial fibrillation  Reason for patient to be following in the Coumadin Clinic: To provide consistency and accuracy in anticoagulation managment by pharmacy experts.  Special instructions: Specific instructions including INR ranges, dosages and follow up instructions are included in the most recent consult order.  Next time this patient requires a face-to-face visit with a provider: In one year.            Key Recommendations:  Please review AVS    Yael Arauz RN, BSN, PHN  Medicine Care Coordinator  [facility], [facility], [facility] [address] [facility] and [facility]  Desk Phone: [phone]  Pager: [phone]    AVS/Discharge Summary is the source of truth; this is a helpful guide for improved communication of patient story

## 2018-09-22 ENCOUNTER — APPOINTMENT (OUTPATIENT)
Dept: CT IMAGING | Facility: CLINIC | Age: 78
DRG: 683 | End: 2018-09-22
Attending: PHYSICIAN ASSISTANT
Payer: MEDICARE

## 2018-09-22 ENCOUNTER — APPOINTMENT (OUTPATIENT)
Dept: PHYSICAL THERAPY | Facility: CLINIC | Age: 78
DRG: 683 | End: 2018-09-22
Attending: INTERNAL MEDICINE
Payer: MEDICARE

## 2018-09-22 PROBLEM — N17.9 AKI (ACUTE KIDNEY INJURY) (H): Status: ACTIVE | Noted: 2018-09-22

## 2018-09-22 LAB
ALBUMIN SERPL-MCNC: 4.1 G/DL (ref 3.4–5)
ALBUMIN UR-MCNC: NEGATIVE MG/DL
ALP SERPL-CCNC: 126 U/L (ref 40–150)
ALT SERPL W P-5'-P-CCNC: 27 U/L (ref 0–70)
ANION GAP SERPL CALCULATED.3IONS-SCNC: 12 MMOL/L (ref 3–14)
ANION GAP SERPL CALCULATED.3IONS-SCNC: 8 MMOL/L (ref 3–14)
APPEARANCE UR: CLEAR
AST SERPL W P-5'-P-CCNC: 11 U/L (ref 0–45)
BASOPHILS # BLD AUTO: 0 10E9/L (ref 0–0.2)
BASOPHILS NFR BLD AUTO: 0.1 %
BILIRUB SERPL-MCNC: 0.9 MG/DL (ref 0.2–1.3)
BILIRUB UR QL STRIP: NEGATIVE
BUN SERPL-MCNC: 82 MG/DL (ref 7–30)
BUN SERPL-MCNC: 95 MG/DL (ref 7–30)
CALCIUM SERPL-MCNC: 8.8 MG/DL (ref 8.5–10.1)
CALCIUM SERPL-MCNC: 9.7 MG/DL (ref 8.5–10.1)
CHLORIDE SERPL-SCNC: 101 MMOL/L (ref 94–109)
CHLORIDE SERPL-SCNC: 96 MMOL/L (ref 94–109)
CO2 SERPL-SCNC: 24 MMOL/L (ref 20–32)
CO2 SERPL-SCNC: 25 MMOL/L (ref 20–32)
COLOR UR AUTO: ABNORMAL
CREAT SERPL-MCNC: 1.91 MG/DL (ref 0.66–1.25)
CREAT SERPL-MCNC: 2.23 MG/DL (ref 0.66–1.25)
CREAT UR-MCNC: 87 MG/DL
DIFFERENTIAL METHOD BLD: ABNORMAL
DIGOXIN SERPL-MCNC: 1.5 UG/L (ref 0.5–2)
EOSINOPHIL # BLD AUTO: 0.1 10E9/L (ref 0–0.7)
EOSINOPHIL NFR BLD AUTO: 1.3 %
ERYTHROCYTE [DISTWIDTH] IN BLOOD BY AUTOMATED COUNT: 14.5 % (ref 10–15)
GFR SERPL CREATININE-BSD FRML MDRD: 29 ML/MIN/1.7M2
GFR SERPL CREATININE-BSD FRML MDRD: 34 ML/MIN/1.7M2
GLUCOSE BLDC GLUCOMTR-MCNC: 253 MG/DL (ref 70–99)
GLUCOSE BLDC GLUCOMTR-MCNC: 262 MG/DL (ref 70–99)
GLUCOSE BLDC GLUCOMTR-MCNC: 271 MG/DL (ref 70–99)
GLUCOSE BLDC GLUCOMTR-MCNC: 293 MG/DL (ref 70–99)
GLUCOSE SERPL-MCNC: 285 MG/DL (ref 70–99)
GLUCOSE SERPL-MCNC: 303 MG/DL (ref 70–99)
GLUCOSE UR STRIP-MCNC: 150 MG/DL
HBA1C MFR BLD: 9 % (ref 0–5.6)
HCT VFR BLD AUTO: 30.2 % (ref 40–53)
HGB BLD-MCNC: 10.4 G/DL (ref 13.3–17.7)
HGB UR QL STRIP: NEGATIVE
HYALINE CASTS #/AREA URNS LPF: 35 /LPF (ref 0–2)
IMM GRANULOCYTES # BLD: 0 10E9/L (ref 0–0.4)
IMM GRANULOCYTES NFR BLD: 0.4 %
INR PPP: 4 (ref 0.86–1.14)
INR PPP: 4.95 (ref 0.86–1.14)
KETONES UR STRIP-MCNC: NEGATIVE MG/DL
LEUKOCYTE ESTERASE UR QL STRIP: NEGATIVE
LYMPHOCYTES # BLD AUTO: 1.5 10E9/L (ref 0.8–5.3)
LYMPHOCYTES NFR BLD AUTO: 18.7 %
MAGNESIUM SERPL-MCNC: 1.8 MG/DL (ref 1.6–2.3)
MCH RBC QN AUTO: 31.7 PG (ref 26.5–33)
MCHC RBC AUTO-ENTMCNC: 34.4 G/DL (ref 31.5–36.5)
MCV RBC AUTO: 92 FL (ref 78–100)
MONOCYTES # BLD AUTO: 0.7 10E9/L (ref 0–1.3)
MONOCYTES NFR BLD AUTO: 9.4 %
MUCOUS THREADS #/AREA URNS LPF: PRESENT /LPF
NEUTROPHILS # BLD AUTO: 5.5 10E9/L (ref 1.6–8.3)
NEUTROPHILS NFR BLD AUTO: 70.1 %
NITRATE UR QL: NEGATIVE
NRBC # BLD AUTO: 0 10*3/UL
NRBC BLD AUTO-RTO: 0 /100
PH UR STRIP: 5 PH (ref 5–7)
PHOSPHATE SERPL-MCNC: 2.7 MG/DL (ref 2.5–4.5)
PLATELET # BLD AUTO: 99 10E9/L (ref 150–450)
POTASSIUM SERPL-SCNC: 4.4 MMOL/L (ref 3.4–5.3)
POTASSIUM SERPL-SCNC: 4.5 MMOL/L (ref 3.4–5.3)
PROT SERPL-MCNC: 8 G/DL (ref 6.8–8.8)
PROT UR-MCNC: 0.13 G/L
PROT/CREAT 24H UR: 0.15 G/G CR (ref 0–0.2)
RBC # BLD AUTO: 3.28 10E12/L (ref 4.4–5.9)
RBC #/AREA URNS AUTO: <1 /HPF (ref 0–2)
SODIUM SERPL-SCNC: 132 MMOL/L (ref 133–144)
SODIUM SERPL-SCNC: 134 MMOL/L (ref 133–144)
SODIUM UR-SCNC: 59 MMOL/L
SOURCE: ABNORMAL
SP GR UR STRIP: 1.01 (ref 1–1.03)
SQUAMOUS #/AREA URNS AUTO: <1 /HPF (ref 0–1)
UROBILINOGEN UR STRIP-MCNC: NORMAL MG/DL (ref 0–2)
UUN UR-MCNC: 487 MG/DL
UUN/CREAT 24H UR: 6 G/G CR
WBC # BLD AUTO: 7.9 10E9/L (ref 4–11)
WBC #/AREA URNS AUTO: <1 /HPF (ref 0–5)

## 2018-09-22 PROCEDURE — 84300 ASSAY OF URINE SODIUM: CPT | Performed by: EMERGENCY MEDICINE

## 2018-09-22 PROCEDURE — 70450 CT HEAD/BRAIN W/O DYE: CPT

## 2018-09-22 PROCEDURE — 25000128 H RX IP 250 OP 636: Performed by: INTERNAL MEDICINE

## 2018-09-22 PROCEDURE — 25000132 ZZH RX MED GY IP 250 OP 250 PS 637: Mod: GY | Performed by: INTERNAL MEDICINE

## 2018-09-22 PROCEDURE — 99223 1ST HOSP IP/OBS HIGH 75: CPT | Mod: AI | Performed by: INTERNAL MEDICINE

## 2018-09-22 PROCEDURE — 40000893 ZZH STATISTIC PT IP EVAL DEFER

## 2018-09-22 PROCEDURE — 99207 ZZC APP CREDIT; MD BILLING SHARED VISIT: CPT | Performed by: PHYSICIAN ASSISTANT

## 2018-09-22 PROCEDURE — 81001 URINALYSIS AUTO W/SCOPE: CPT | Performed by: EMERGENCY MEDICINE

## 2018-09-22 PROCEDURE — 93010 ELECTROCARDIOGRAM REPORT: CPT | Mod: 76 | Performed by: INTERNAL MEDICINE

## 2018-09-22 PROCEDURE — 80162 ASSAY OF DIGOXIN TOTAL: CPT | Performed by: EMERGENCY MEDICINE

## 2018-09-22 PROCEDURE — 84100 ASSAY OF PHOSPHORUS: CPT | Performed by: INTERNAL MEDICINE

## 2018-09-22 PROCEDURE — 25000131 ZZH RX MED GY IP 250 OP 636 PS 637: Mod: GY | Performed by: INTERNAL MEDICINE

## 2018-09-22 PROCEDURE — 83036 HEMOGLOBIN GLYCOSYLATED A1C: CPT | Performed by: INTERNAL MEDICINE

## 2018-09-22 PROCEDURE — A9270 NON-COVERED ITEM OR SERVICE: HCPCS | Mod: GY | Performed by: PHYSICIAN ASSISTANT

## 2018-09-22 PROCEDURE — 96372 THER/PROPH/DIAG INJ SC/IM: CPT

## 2018-09-22 PROCEDURE — 80048 BASIC METABOLIC PNL TOTAL CA: CPT | Performed by: INTERNAL MEDICINE

## 2018-09-22 PROCEDURE — 00000146 ZZHCL STATISTIC GLUCOSE BY METER IP

## 2018-09-22 PROCEDURE — 80053 COMPREHEN METABOLIC PANEL: CPT | Performed by: EMERGENCY MEDICINE

## 2018-09-22 PROCEDURE — 84156 ASSAY OF PROTEIN URINE: CPT | Performed by: EMERGENCY MEDICINE

## 2018-09-22 PROCEDURE — 25000132 ZZH RX MED GY IP 250 OP 250 PS 637: Mod: GY | Performed by: PHYSICIAN ASSISTANT

## 2018-09-22 PROCEDURE — G0378 HOSPITAL OBSERVATION PER HR: HCPCS

## 2018-09-22 PROCEDURE — 12000001 ZZH R&B MED SURG/OB UMMC

## 2018-09-22 PROCEDURE — 25000128 H RX IP 250 OP 636: Performed by: EMERGENCY MEDICINE

## 2018-09-22 PROCEDURE — 85025 COMPLETE CBC W/AUTO DIFF WBC: CPT | Performed by: EMERGENCY MEDICINE

## 2018-09-22 PROCEDURE — 85610 PROTHROMBIN TIME: CPT | Performed by: INTERNAL MEDICINE

## 2018-09-22 PROCEDURE — 93005 ELECTROCARDIOGRAM TRACING: CPT

## 2018-09-22 PROCEDURE — 36415 COLL VENOUS BLD VENIPUNCTURE: CPT | Performed by: INTERNAL MEDICINE

## 2018-09-22 PROCEDURE — 83735 ASSAY OF MAGNESIUM: CPT | Performed by: INTERNAL MEDICINE

## 2018-09-22 PROCEDURE — 96361 HYDRATE IV INFUSION ADD-ON: CPT

## 2018-09-22 PROCEDURE — A9270 NON-COVERED ITEM OR SERVICE: HCPCS | Mod: GY | Performed by: INTERNAL MEDICINE

## 2018-09-22 PROCEDURE — 84540 ASSAY OF URINE/UREA-N: CPT | Performed by: EMERGENCY MEDICINE

## 2018-09-22 PROCEDURE — 85610 PROTHROMBIN TIME: CPT | Performed by: EMERGENCY MEDICINE

## 2018-09-22 RX ORDER — ATORVASTATIN CALCIUM 20 MG/1
20 TABLET, FILM COATED ORAL EVERY EVENING
Status: DISCONTINUED | OUTPATIENT
Start: 2018-09-22 | End: 2018-09-24 | Stop reason: HOSPADM

## 2018-09-22 RX ORDER — AMOXICILLIN 250 MG
2 CAPSULE ORAL 2 TIMES DAILY PRN
Status: DISCONTINUED | OUTPATIENT
Start: 2018-09-22 | End: 2018-09-24 | Stop reason: HOSPADM

## 2018-09-22 RX ORDER — DEXTROSE MONOHYDRATE 25 G/50ML
25-50 INJECTION, SOLUTION INTRAVENOUS
Status: DISCONTINUED | OUTPATIENT
Start: 2018-09-22 | End: 2018-09-24 | Stop reason: HOSPADM

## 2018-09-22 RX ORDER — CLOPIDOGREL BISULFATE 75 MG/1
75 TABLET ORAL DAILY
Status: DISCONTINUED | OUTPATIENT
Start: 2018-09-22 | End: 2018-09-24 | Stop reason: HOSPADM

## 2018-09-22 RX ORDER — ACETAMINOPHEN 650 MG/1
650 SUPPOSITORY RECTAL EVERY 4 HOURS PRN
Status: DISCONTINUED | OUTPATIENT
Start: 2018-09-22 | End: 2018-09-24 | Stop reason: HOSPADM

## 2018-09-22 RX ORDER — METOPROLOL SUCCINATE 100 MG/1
100 TABLET, EXTENDED RELEASE ORAL DAILY
Status: DISCONTINUED | OUTPATIENT
Start: 2018-09-22 | End: 2018-09-22

## 2018-09-22 RX ORDER — ACETAMINOPHEN 325 MG/1
650 TABLET ORAL EVERY 4 HOURS PRN
Status: DISCONTINUED | OUTPATIENT
Start: 2018-09-22 | End: 2018-09-24 | Stop reason: HOSPADM

## 2018-09-22 RX ORDER — SODIUM CHLORIDE, SODIUM LACTATE, POTASSIUM CHLORIDE, CALCIUM CHLORIDE 600; 310; 30; 20 MG/100ML; MG/100ML; MG/100ML; MG/100ML
INJECTION, SOLUTION INTRAVENOUS
Status: DISCONTINUED
Start: 2018-09-22 | End: 2018-09-23 | Stop reason: HOSPADM

## 2018-09-22 RX ORDER — NICOTINE POLACRILEX 4 MG
15-30 LOZENGE BUCCAL
Status: DISCONTINUED | OUTPATIENT
Start: 2018-09-22 | End: 2018-09-24 | Stop reason: HOSPADM

## 2018-09-22 RX ORDER — ACETAMINOPHEN 325 MG/1
325 TABLET ORAL EVERY 4 HOURS PRN
Status: DISCONTINUED | OUTPATIENT
Start: 2018-09-22 | End: 2018-09-22

## 2018-09-22 RX ORDER — AMOXICILLIN 250 MG
1 CAPSULE ORAL 2 TIMES DAILY PRN
Status: DISCONTINUED | OUTPATIENT
Start: 2018-09-22 | End: 2018-09-24 | Stop reason: HOSPADM

## 2018-09-22 RX ORDER — NALOXONE HYDROCHLORIDE 0.4 MG/ML
.1-.4 INJECTION, SOLUTION INTRAMUSCULAR; INTRAVENOUS; SUBCUTANEOUS
Status: DISCONTINUED | OUTPATIENT
Start: 2018-09-22 | End: 2018-09-24 | Stop reason: HOSPADM

## 2018-09-22 RX ORDER — ONDANSETRON 2 MG/ML
4 INJECTION INTRAMUSCULAR; INTRAVENOUS EVERY 6 HOURS PRN
Status: DISCONTINUED | OUTPATIENT
Start: 2018-09-22 | End: 2018-09-24 | Stop reason: HOSPADM

## 2018-09-22 RX ORDER — CITALOPRAM HYDROBROMIDE 10 MG/1
10 TABLET ORAL DAILY
Status: DISCONTINUED | OUTPATIENT
Start: 2018-09-22 | End: 2018-09-24 | Stop reason: HOSPADM

## 2018-09-22 RX ORDER — ONDANSETRON 4 MG/1
4 TABLET, ORALLY DISINTEGRATING ORAL EVERY 6 HOURS PRN
Status: DISCONTINUED | OUTPATIENT
Start: 2018-09-22 | End: 2018-09-24 | Stop reason: HOSPADM

## 2018-09-22 RX ADMIN — SODIUM CHLORIDE 500 ML: 9 INJECTION, SOLUTION INTRAVENOUS at 01:24

## 2018-09-22 RX ADMIN — ATORVASTATIN CALCIUM 20 MG: 20 TABLET, FILM COATED ORAL at 22:34

## 2018-09-22 RX ADMIN — CITALOPRAM HYDROBROMIDE 10 MG: 10 TABLET ORAL at 09:49

## 2018-09-22 RX ADMIN — SODIUM CHLORIDE, POTASSIUM CHLORIDE, SODIUM LACTATE AND CALCIUM CHLORIDE 250 ML: 600; 310; 30; 20 INJECTION, SOLUTION INTRAVENOUS at 06:00

## 2018-09-22 RX ADMIN — METOPROLOL SUCCINATE 100 MG: 100 TABLET, EXTENDED RELEASE ORAL at 09:49

## 2018-09-22 RX ADMIN — INSULIN ASPART 3 UNITS: 100 INJECTION, SOLUTION INTRAVENOUS; SUBCUTANEOUS at 09:43

## 2018-09-22 RX ADMIN — VITAMIN D, TAB 1000IU (100/BT) 1000 UNITS: 25 TAB at 09:49

## 2018-09-22 RX ADMIN — INSULIN ASPART 2 UNITS: 100 INJECTION, SOLUTION INTRAVENOUS; SUBCUTANEOUS at 22:34

## 2018-09-22 RX ADMIN — INSULIN ASPART 3 UNITS: 100 INJECTION, SOLUTION INTRAVENOUS; SUBCUTANEOUS at 12:54

## 2018-09-22 RX ADMIN — CLOPIDOGREL 75 MG: 75 TABLET, FILM COATED ORAL at 09:49

## 2018-09-22 RX ADMIN — RANITIDINE 150 MG: 150 TABLET, FILM COATED ORAL at 09:48

## 2018-09-22 RX ADMIN — INSULIN ASPART 3 UNITS: 100 INJECTION, SOLUTION INTRAVENOUS; SUBCUTANEOUS at 18:22

## 2018-09-22 ASSESSMENT — ACTIVITIES OF DAILY LIVING (ADL)
ADLS_ACUITY_SCORE: 16

## 2018-09-22 NOTE — ED TRIAGE NOTES
Pt arrives after having blood drawn at outside clinic and was told to come to ED for poor kidney function. Pt does not report any other abnormal labs.

## 2018-09-22 NOTE — PLAN OF CARE
Problem: Patient Care Overview  Goal: Plan of Care/Patient Progress Review  OT 6D. DEFER. Per conversation with PT, pt is modified independent with mobility and ADL completion. Pt has assist at home if needed for ADL/IADL completion.

## 2018-09-22 NOTE — PHARMACY-ANTICOAGULATION SERVICE
Clinical Pharmacy - Warfarin Dosing Consult     Pharmacy has been consulted to manage this patient s warfarin therapy.  Indication: Atrial Fibrillation  Therapy Goal: INR 2-3  Warfarin Prior to Admission: Yes  Warfarin PTA Regimen: Warfarin 5mg po daily  Significant drug interactions: Clopidogrel  Recent documented change in oral intake/nutrition: Yes    INR   Date Value Ref Range Status   09/22/2018 4.00 (H) 0.86 - 1.14 Final     INR Point of Care   Date Value Ref Range Status   09/14/2018 2.9 (H) 0.86 - 1.14 Final     Comment:     This test is intended for monitoring Coumadin therapy.  Results are not   accurate in patients with prolonged INR due to factor deficiency.         Recommend to hold warfarin today.  Pharmacy will monitor Tad Manning daily and order warfarin doses to achieve specified goal.      Please contact pharmacy as soon as possible if the warfarin needs to be held for a procedure or if the warfarin goals change.      Karen Valencia, PharmD, MS  [phone]

## 2018-09-22 NOTE — ED PROVIDER NOTES
"  History     Chief Complaint   Patient presents with     Abnormal Labs     HPI  Tad Manning is a 77 year old male with hx of chronic lymphocytic leukemia, CHF, DM II, HTN, CAD, and Afib on warfarin who presents due to report of poor kidney function. Patient reports completing lab work at [facility] (though per chart review, this lab work cannot be found) and received a call today about an \"abnormal kidney function value\". He has felt somewhat generally weak. He is otherwise currently asymptomatic however, denying changes in any of his baseline symptoms (having had chills for the past 6 months, as well as chronic back/leg pain--all unchanged). Patient denies shortness of breath or chest pain. He denies recent medication changes and states he has been taking his Lasix (60 mg BID) and lisinopril as prescribed. He denies changes in urine frequency/volume. No dysuria, no urgency/frequency.     Past Medical History:   Diagnosis Date     Arthritis      ASCVD (arteriosclerotic cardiovascular disease)      BMI 30.0-30.9,adult      BPH (benign prostatic hypertrophy)      Cellulitis      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)      Coronary artery disease     triple bypass 1995     Diabetes mellitus (H)      Diabetic polyneuropathy (H)      History of blood transfusion      Hyperlipidaemia      Hypertension      Noninflammatory pericardial effusion      Osteomyelitis of left foot (H)      PVD (peripheral vascular disease) (H)      Sebaceous cyst        Past Surgical History:   Procedure Laterality Date     AMPUTATE TOE(S)  1/10/2014    Procedure: AMPUTATE TOE(S);;  Surgeon: Amador Vick MD;  Location:  OR     BONE MARROW BIOPSY, BONE SPECIMEN, NEEDLE/TROCAR  10/19/2012    Procedure: BIOPSY BONE MARROW;  BONE MARROW BIOPSY  (CONSCIOUS SED);  Surgeon: Ramon Quesada MD;  Location:  GI     BYPASS GRAFT FEMOROPOPLITEAL  1/10/2014    Procedure: BYPASS GRAFT " FEMOROPOPLITEAL;  Left above knee popliteal to  Below knee popliteal bypass using transverse saphenous vein graft. Left 2nd Toe amputation;  Surgeon: Amador Vick MD;  Location: [facility] OR     CARDIAC SURGERY      angioplasty with stent, triple bypass     EXCISE CYST GENERIC (LOCATION) N/A 2/1/2016    Procedure: EXCISE CYST GENERIC (LOCATION);  Surgeon: Amador Vick MD;  Location: [facility]     GRAFT VEIN FROM EXTREMITY (LOCATION)  1/10/2014    Procedure: GRAFT VEIN FROM EXTREMITY (LOCATION);;  Surgeon: Amador Vick MD;  Location: [facility] OR     LAMINECTOMY THORACIC ONE LEVEL N/A 2/8/2017    Procedure: LAMINECTOMY THORACIC ONE LEVEL;  Surgeon: Marcelo Trent MD;  Location: [facility] OR     OPTICAL TRACKING SYSTEM FUSION POSTERIOR SPINE THORACIC THREE+ LEVELS N/A 2/12/2017    Procedure: OPTICAL TRACKING SYSTEM FUSION POSTERIOR SPINE THORACIC THREE+ LEVELS;  Surgeon: Marcelo Trent MD;  Location: [facility] OR     TONSILLECTOMY       VASCULAR SURGERY      ptcr legs       Family History   Problem Relation Age of Onset     Cancer Mother      leukemia       Social History   Substance Use Topics     Smoking status: Former Smoker     Packs/day: 2.00     Years: 30.00     Types: Cigarettes     Quit date: 6/19/1995     Smokeless tobacco: Never Used     Alcohol use No       Current Facility-Administered Medications   Medication     acetaminophen (TYLENOL) Suppository 650 mg     acetaminophen (TYLENOL) tablet 650 mg     atorvastatin (LIPITOR) tablet 20 mg     cholecalciferol (vitamin D3) tablet 1,000 Units     citalopram (celeXA) tablet 10 mg     clopidogrel (PLAVIX) tablet 75 mg     glucose gel 15-30 g    Or     dextrose 50 % injection 25-50 mL    Or     glucagon injection 1 mg     influenza Vac Split High-Dose (FLUZONE) injection 0.5 mL     insulin aspart (NovoLOG) inj (RAPID ACTING)     insulin aspart (NovoLOG) inj (RAPID ACTING)     melatonin tablet 1 mg     melatonin tablet 5 mg     metoprolol succinate (TOPROL-XL) 24  hr tablet 100 mg     naloxone (NARCAN) injection 0.1-0.4 mg     ondansetron (ZOFRAN-ODT) ODT tab 4 mg    Or     ondansetron (ZOFRAN) injection 4 mg     ranitidine (ZANTAC) tablet 150 mg     senna-docusate (SENOKOT-S;PERICOLACE) 8.6-50 MG per tablet 1 tablet    Or     senna-docusate (SENOKOT-S;PERICOLACE) 8.6-50 MG per tablet 2 tablet     Warfarin Therapy Reminder (Check START DATE - warfarin may be starting in the FUTURE)        Allergies   Allergen Reactions     Blood Transfusion Related (Informational Only) Other (See Comments)     Patient has a history of a clinically significant antibody against RBC antigens.  A delay in compatible RBCs may occur.      I have reviewed the Medications, Allergies, Past Medical and Surgical History, and Social History in the Epic system.    Review of Systems  A 10-system review of systems was completed with pertinent positives and negatives noted in the HPI, otherwise negative.     Physical Exam   BP: 100/51  Pulse: 92  Heart Rate: 75  Temp: 97.9  F (36.6  C)  Resp: 18  Height: 182.9 cm (6')  Weight: 81.6 kg (180 lb)  SpO2: 100 %  Lying Orthostatic BP: 135/67  Lying Orthostatic Pulse: 97 bpm  Sitting Orthostatic BP: 102/65  Sitting Orthostatic Pulse: 99 bpm  Standing Orthostatic BP: 97/54  Standing Orthostatic Pulse: 113 bpm      Physical Exam  /46 (BP Location: Right arm)  Pulse 74  Temp 96.9  F (36.1  C) (Oral)  Resp 18  Ht 1.829 m (6')  Wt 82 kg (180 lb 12.8 oz)  SpO2 100%  BMI 24.52 kg/m2  General: elderly male appearing stated age, no acute distress  HENT: MMM, no oropharyngeal lesions  Eyes: PERRL, normal sclerae, no conjunctival pallor  Cardio: RRR, normal heart sounds, extremities well perfused  Resp: Normal work of breathing, clear breath sounds  Chest/Back: no visual signs of trauma, no CVA tenderness  Abdomen: no tenderness, non-distended, no rebound, no guarding  Neuro: alert and fully oriented. CN II-XII grossly intact. Grossly normal strength and  sensation in all extremities.   MSK: no deformities.   Integumentary/Skin: no rash, normal color  Psych: normal affect, normal behavior    ED Course     ED Course     Procedures  Results for orders placed during the hospital encounter of 09/21/18   POC US RETROPERITONEAL LIMITED    Impression [facility] Procedure Note    Limited Bedside ED Renal Ultrasound:    PERFORMED BY: Dr. Jude Chirinos  INDICATIONS:  Acute kidney injury  PROBE: Low frequency convex probe  BODY LOCATION:  Abdomen  FINDINGS:  The ultrasound was performed with longitudinal and transverse views.   Right Kidney:   Hydronephrosis:  None   Renal cyst:  None  Left Kidney:   Hydronephrosis:  None   Renal cyst:  Single  INTERPRETATION:  No hydronephrosis, single simple cyst in left kidney.   IMAGE DOCUMENTATION: Images were archived to PACs system.    [facility] Procedure Note      Limited Bedside ED Ultrasound of the Urinary Bladder:    PERFORMED BY: Dr. Jude Chirinos  INDICATIONS:  Acute kidney injury  PROBE: Low frequency convex probe  BODY LOCATION:  Abdomen  FINDINGS:  Visualization of the bladder in longitudinal and transverse views demonstrated a mildly full state post-void.  The bladder dimensions measured: 8.1 cm width X 7.6 height cm X 5.1 cm length. Calculated volume 255 ml. Incidental prostate hypertrophy noted.   INTERPRETATION:  Total calculated volume was estimated at 255 ml post-void.    IMAGE DOCUMENTATION: Images were archived to PACs system.            Critical Care time:  none       Labs Ordered and Resulted from Time of ED Arrival Up to the Time of Departure from the ED   CBC WITH PLATELETS DIFFERENTIAL - Abnormal; Notable for the following:        Result Value    RBC Count 3.28 (*)     Hemoglobin 10.4 (*)     Hematocrit 30.2 (*)     Platelet Count 99 (*)     All other components within normal limits   COMPREHENSIVE METABOLIC PANEL - Abnormal; Notable for the following:     Sodium 132 (*)     Glucose 303 (*)      Urea Nitrogen 95 (*)     Creatinine 2.23 (*)     GFR Estimate 29 (*)     GFR Estimate If Black 35 (*)     All other components within normal limits   INR - Abnormal; Notable for the following:     INR 4.00 (*)     All other components within normal limits   ROUTINE UA WITH MICROSCOPIC REFLEX TO CULTURE - Abnormal; Notable for the following:     Glucose Urine 150 (*)     Mucous Urine Present (*)     Hyaline Casts 35 (*)     All other components within normal limits   CREATININE RANDOM URINE   SODIUM RANDOM URINE   PERIPHERAL IV CATHETER            Assessments & Plan (with Medical Decision Making)   In the ED, the patient was afebrile and hemodynamically stable. History notable for reported lab value showing poor kidney function. Exam notable for lack of CVA and abdominal tenderness. Initial differential diagnosis includes but not limited to hypovolemia, pre-renal STORM, obstructive nephropathy, ATN.     Cr 2.23, up from previous of 1.4. Bedside ultrasound without evidence of hydronephrosis nor significant urinary retention. No fever nor leukocytosis to suggest significant infection. UA without evidence of UTI. Patient does take several diuretics, which could be making hypovolemia and pre-renal STORM. Patient would benefit from adjustments to his diuretics and further testing for cause of STORM and generalized weakness.     The complete clinical picture is most consistent with STORM. After counseling on the diagnosis, work-up, and treatment plan, the patient was admitted to medicine.       Clinical Impression:  Acute kidney injury       Jude Chirinos MD  Emergency Medicine       I have reviewed the nursing notes.    I have reviewed the findings, diagnosis, plan and need for follow up with the patient.    Current Discharge Medication List          Final diagnoses:   STORM (acute kidney injury) (H)   I, Willie Hernandez, am serving as a trained medical scribe to document services personally performed by Jude Chirinos MD, based  on the provider's statements to me.   I, Jude Chirinos MD, was physically present and have reviewed and verified the accuracy of this note documented by Willie Hernandez.      9/21/2018   [facility], [place], EMERGENCY DEPARTMENT     Jude Chirinos MD  09/23/18 4000

## 2018-09-22 NOTE — PROGRESS NOTES
"[facility], [place]    Internal Medicine Progress Note - Gold Service      Assessment & Plan   Tad Manning is a 77 year old male admitted on 9/21/2018. He presents with HTN, HFrEF, HLD, CAD s/p 3-vessel CABG (1995) and PCI to mLAD (3/2018), PAD s/p ileal artery stents, persistent atrial fibrillation, DM type II, CKD stage III, CLL (in remission), BPH, GERD, and anxiety and is admitted for STORM.    STORM on CKD. Obtained routine labs in clinic where he was notified of abnormal creatinine results and subsequently presented to the ED. Creatinine 2.23 in ED, recent BL ~ 1.4. UA with 35 hyaline casts. FeUrea 16% consistent with pre-renal etiology. Received 750 ml IVF (EF 25%) with subsequent Cr improved to 1.91 this AM. Most likely related to hypovolemia with poor PO intake. Patient denies urinary concerns.  - Encourage PO intake  - Judicious use of IVF given significant HF  - Holding PTA diuretics, lisinopril  - Avoid nephrotoxic agents  - Trend BMP    Mechanical Falls. Reports sustaining mechanical falls over the past several months, more frequently over the past few weeks. States his \"knees give out\". Believes he may have hit his head. CT head negative 9/22. Suspect related to hypovolemia with poor PO intake PTA, ?contribution from cardiac medications. Orthostatic vitals this admission consistent with orthostatic HOTN  - Encourage PO intake  - PT/OT consults  - Fall precautions  - Slow when going from sit to stand    Supratherapeutic INR, Persistent Atrial Fibrillation, HTN. PTA regimen includes lisinopril, furosemide, spironolactone, and metoprolol. Started on digoxin 7/17 as not interested in invasive measures; appears was not renally dosed (patient with borderline GFR <50 which would align with digoxin dose of 0.0625 mg daily, was prescribed 0.125 mg daily). Discussed with cardiology, recommend EKG and digoxin level to check for toxicity; if normal, would be okay to restart " digoxin at lower dose once medically appropriate. Anticoagulated with warfarin, INR supratherapeutic at 4.95 in setting of poor PO intake. BP's well-controlled since admission, patient in RRR on exam.  - Continue metoprolol 100 mg daily  - Hold diuretics and digoxin for now  - EKG and digoxin level ordered  - Warfarin dosing per pharmacy  - Daily INR    HFrEF. Echo 7/2018 with severely reduced EF, LVEF of 25%, RV dilatation with systolic dysfunction. Maintained on furosemide, spironolactone, and metoprolol per above. Per patient, baseline weight of ~180 lbs; currently 182 lbs but appears hypovolemic on exam.  - Holding diuretics  - I&O, daily weights  - Trend BMP    DM Type II. HgbA1C 9.0% on admission. Home regimen includes metformin 500 mg BID which was held on admission. BG elevated since admission, ranging 260-300's.  - Holding metformin  - Medium sliding scale insulin  - BG checks qAC, at bedtime  - Hypoglycemia protocol    CAD, HLD, PAD. S/p 3-vessel CABG in 1995 and PCI to mLAD 3/2018. Also hx of ileal artery stents. Maintained on plavix and atorvastatin PTA.  - Continue plavix 75 mg daily  - Continue atorvastatin 20 mg daily    BPH. Maintained on tamsulosin PTA, held on admission due to STORM and recurrent falls. No urinary concerns currently.  - Hold tamsulosin    GERD. Maintained on ranitidine PTA, continue.    Anxiety. Home regimen includes citalopram and PRN alprazolam. Mood currently stable.  - Continue citalopram 10 mg daily  - Holding alprazolam given fall risk, would recommend discontinuing going forward    Diet: Moderate Consistent CHO Diet  Fluids: Encourage PO; patient with hx of significant HF  Dugan Catheter: not present    DVT Prophylaxis: Warfarin  Code Status: Full Code    Disposition Plan   Expected discharge: 2 - 3 days, recommended to prior living arrangement once renal function at baseline, BG controlled, and safe disposition plan identified.     Entered: Ivet Mcclure PA-C  "09/22/2018, 7:54 AM   Information in the above section will display in the discharge planner report.      The patient's care was discussed with the Attending Physician, Dr. Morrissey.    Ivet Mcclure PA-C  Internal Medicine Staff Hospitalist Service  [facility]  Pager: [phone]  Please see sticky note for cross cover information    Interval History   Tad is feeling okay this morning, notes he hasn't had much sleep due to getting settled into his hospital room. Reports generalized weakness over the past several weeks, has been having occasional falls where his knees \"give out\" on him. Believes he hit his head at one point. Does not sound like he has had LOC with these falls although patient is uncertain. Appetite has been \"up and down\". Patient admits he does not drink enough water, tries to drink other liquids such as diet soda or juice. No nausea or vomiting.     Data reviewed today: I reviewed all medications, new labs and imaging results over the last 24 hours. I personally reviewed no images or EKG's today.    Physical Exam   Vital Signs: Temp: 98  F (36.7  C) Temp src: Oral BP: 126/86 Pulse: 92 Heart Rate: 75 Resp: 16 SpO2: 99 % O2 Device: None (Room air)    Weight: 182 lbs 0 oz  General Appearance: Elderly  male resting comfortably in bed, NAD.  HEENT: NC/AT. Small raised lesion along posterior skull, no bony crepitus or step offs. Dry mucous membranes.  Respiratory: Respiratory effort normal on RA. Lungs CTAB without rales, rhonchi, or wheezing.  Cardiovascular: RRR, S1/S2. No murmurs, rubs, or gallops.  GI: Abdomen soft, non-distended, non-tender. Bowel sounds normoactive.  Skin: Multiple contusions along upper extremities. Chronic ulcer along plantar aspect of right foot, no surrounding erythema or drainage. Laceration along anterior aspect of left leg, no active bleeding.  Extremities: No peripheral edema.        Data   Data     Recent Labs  Lab " 09/22/18  0710 09/22/18  0042   WBC  --  7.9   HGB  --  10.4*   MCV  --  92   PLT  --  99*   INR 4.95* 4.00*    132*   POTASSIUM 4.4 4.5   CHLORIDE 101 96   CO2 25 24   BUN 82* 95*   CR 1.91* 2.23*   ANIONGAP 8 12   JUSTINO 8.8 9.7   * 303*   ALBUMIN  --  4.1   PROTTOTAL  --  8.0   BILITOTAL  --  0.9   ALKPHOS  --  126   ALT  --  27   AST  --  11

## 2018-09-22 NOTE — PLAN OF CARE
Problem: Patient Care Overview  Goal: Discharge Needs Assessment  Outcome: No Change  Observation goals PRIOR TO DISCHARGE     Comments:   -diagnostic tests and consults completed and resulted: No    -vital signs normal or at patient baseline: Yes, /86 (BP Location: Right arm)  Pulse 92  Temp 98  F (36.7  C) (Oral)  Resp 16  Ht 1.829 m (6')  Wt 82.6 kg (182 lb)  SpO2 99%  BMI 24.68 kg/m2      The Pt is alert and oriented x 4. Tele shows Sinus Rhythm HR 80's.  Slight urinary incontinence, occasionally dribbles. Pt wears pull up. Coccyx reddened, no open area noted. Barrier cream applied.     Denied pain.  Pt oriented to room and call light with in reach    Nurse to notify provider when observation goals have been met and patient is ready for discharge.

## 2018-09-22 NOTE — ED NOTES
[facility], [place]   ED Nurse to Floor Handoff     Tad Manning is a 77 year old male who speaks English and lives unknown,  in a home  They arrived in the ED by car from clinic    ED Chief Complaint: Abnormal Labs    ED Dx;   Final diagnoses:   STORM (acute kidney injury) (H)         Needed?: No    Allergies:   Allergies   Allergen Reactions     Blood Transfusion Related (Informational Only) Other (See Comments)     Patient has a history of a clinically significant antibody against RBC antigens.  A delay in compatible RBCs may occur.    .  Past Medical Hx:   Past Medical History:   Diagnosis Date     Arthritis      ASCVD (arteriosclerotic cardiovascular disease)      BMI 30.0-30.9,adult      BPH (benign prostatic hypertrophy)      Cellulitis      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)      Coronary artery disease     triple bypass 1995     Diabetes mellitus (H)      Diabetic polyneuropathy (H)      History of blood transfusion      Hyperlipidaemia      Hypertension      Noninflammatory pericardial effusion      Osteomyelitis of left foot (H)      PVD (peripheral vascular disease) (H)      Sebaceous cyst       Baseline Mental status: WDL  Current Mental Status changes: at basesline    Infection present or suspected this encounter: no  Sepsis suspected: No  Isolation type: No active isolations     Activity level - Baseline/Home:  Independent  Activity Level - Current:   Independent    Bariatric equipment needed?: No    In the ED these meds were given:   Medications   0.9% sodium chloride BOLUS (0 mLs Intravenous Stopped 9/22/18 0240)       Drips running?  No    Home pump  No    Current LDAs  Peripheral IV 02/05/18 Left Lower forearm (Active)   Number of days:229       Peripheral IV 03/06/18 Left Hand (Active)   Number of days:200       Peripheral IV 09/22/18 Left Upper forearm (Active)   Site Assessment WDL 9/22/2018 12:46 AM    Dressing Intervention New dressing  9/22/2018 12:46 AM   Number of days:0       PICC Double Lumen 02/10/17 Right Basilic (Active)   Number of days:589       Venous Sheath (Active)   Number of days:200       Right Groin Interventional Procedure Access (Active)   Number of days:       Closed/Suction Drain 1 Back 10 Macedonian (Active)   Number of days:587       Wound 01/20/17 Right Heel Other (comment) (Active)   Number of days:610       Wound 02/09/17 Posterior Coccyx Suspected pressure ulcer nonblanchable redness (Active)   Number of days:590       Wound 02/15/17 Mid Coccyx Suspected pressure ulcer non blanchable open area (Active)   Number of days:584       Wound 02/19/17 Right Buttocks open area on right buttock (Active)   Number of days:580       Wound 03/08/17 Lateral;Right Foot 2 scabbed areas on plantar area on smallest toe and below that. 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 (Active)   Number of days:563       Rash 03/14/17 2000 Bilateral anterior groin other (see comments) (Active)   Number of days:557       Incision/Surgical Site 02/01/16 Neck (Active)   Number of days:964       Incision/Surgical Site 02/01/16 Right Chest (Active)   Number of days:964       Incision/Surgical Site 02/09/17 Midline;Mid Back (Active)   Number of days:590       Incision/Surgical Site 02/09/17 Bilateral Back (Active)   Number of days:590       Incision/Surgical Site 02/12/17 Mid Back (Active)   Number of days:587       Incision/Surgical Site 02/15/17 Mid Back (Active)   Number of days:584       Labs results:   Labs Ordered and Resulted from Time of ED Arrival Up to the Time of Departure from the ED   CBC WITH PLATELETS DIFFERENTIAL - Abnormal; Notable for the following:        Result Value    RBC Count 3.28 (*)     Hemoglobin 10.4 (*)     Hematocrit 30.2 (*)     Platelet Count 99 (*)     All other components within normal limits   COMPREHENSIVE METABOLIC PANEL - Abnormal; Notable for the following:     Sodium 132 (*)     Glucose 303  (*)     Urea Nitrogen 95 (*)     Creatinine 2.23 (*)     GFR Estimate 29 (*)     GFR Estimate If Black 35 (*)     All other components within normal limits   INR - Abnormal; Notable for the following:     INR 4.00 (*)     All other components within normal limits   ROUTINE UA WITH MICROSCOPIC REFLEX TO CULTURE - Abnormal; Notable for the following:     Glucose Urine 150 (*)     Mucous Urine Present (*)     Hyaline Casts 35 (*)     All other components within normal limits   CREATININE RANDOM URINE   SODIUM RANDOM URINE   PERIPHERAL IV CATHETER       Imaging Studies:   Recent Results (from the past 24 hour(s))   POC US Retroperitoneal Limited    Impression    [facility] Procedure Note    Limited Bedside ED Renal Ultrasound:    PERFORMED BY: Dr. Jude Chirinos  INDICATIONS:  Acute kidney injury  PROBE: Low frequency convex probe  BODY LOCATION:  Abdomen  FINDINGS:  The ultrasound was performed with longitudinal and transverse views.   Right Kidney:   Hydronephrosis:  None   Renal cyst:  None  Left Kidney:   Hydronephrosis:  None   Renal cyst:  Single  INTERPRETATION:  No hydronephrosis, single simple cyst in left kidney.   IMAGE DOCUMENTATION: Images were archived to PACs system.    [facility] Procedure Note      Limited Bedside ED Ultrasound of the Urinary Bladder:    PERFORMED BY: Dr. Jude Chirinos  INDICATIONS:  Acute kidney injury  PROBE: Low frequency convex probe  BODY LOCATION:  Abdomen  FINDINGS:  Visualization of the bladder in longitudinal and transverse views demonstrated a mildly full state post-void.  The bladder dimensions measured: 8.1 cm width X 7.6 height cm X 5.1 cm length. Calculated volume 255 ml. Incidental prostate hypertrophy noted.   INTERPRETATION:  Total calculated volume was estimated at 255 ml post-void.    IMAGE DOCUMENTATION: Images were archived to PACs system.           Recent vital signs:   /63  Pulse 92  Temp 97.9  F (36.6  C)  Resp 11  Ht 1.829 m (6')   Wt 81.6 kg (180 lb)  SpO2 99%  BMI 24.41 kg/m2    Cardiac Rhythm: Normal Sinus  Pt needs tele? No  Skin/wound Issues: None    Code Status: Full Code    Pain control: good    Nausea control: good    Abnormal labs/tests/findings requiring intervention: CMP    Family present during ED course? Yes   Family Comments/Social Situation comments:     Tasks needing completion: None    Sanjeev Thompson, RN    [phone] [facility]

## 2018-09-22 NOTE — PLAN OF CARE
Problem: Patient Care Overview  Goal: Plan of Care/Patient Progress Review  PT 6D: Defer - PT orders acknowledged and appreciated. Pt demonstrating bed mobility at MOD I, transfers and gait with 4WW at MOD I, toileting at MOD I with use of railings. Pt is mobilizing at his baseline and is safe to return to home environment. Pt reporting some generalized weakness and reduction in his overall activity tolerance and would benefit from OP PT to address these impairments. Pt with good strength and balance during assessment today. Pt with positive orthostatic BP and symptomatic between sitting and standing - similar reports as when he is at home, he notes. Knee bucking and falling likely related to volume/medical/BP impairments rather than true weakness or impairments to static balance. Pt with no further IP PT needs at this time. Will complete orders. Please reorder with change in status. Thank you for your referral.     Recommending discharge to home when medically appropriate.  Please assist pt in ambulating 3x/day with use of his 4WW + gait belt for safety -- SBA secondary to positive orthostatics and.

## 2018-09-22 NOTE — PLAN OF CARE
Problem: Patient Care Overview  Goal: Discharge Needs Assessment  Outcome: No Change  Observation goals PRIOR TO DISCHARGE     Comments: -diagnostic tests and consults completed and resulted: No    -vital signs normal or at patient baseline: Yes, /86 (BP Location: Right arm)  Pulse 92  Temp 98  F (36.7  C) (Oral)  Resp 16  Ht 1.829 m (6')  Wt 82.6 kg (182 lb)  SpO2 99%  BMI 24.68 kg/m2     The Pt is alert and oriented x 4. Pt wears pull up. Occasionally dribbles. Coccyx reddened, no open area noted. Barrier cream applied. Reported of mild lower back pain, denied need for intervention. Tele applied. Pt oriented to room and call light with in reach    Nurse to notify provider when observation goals have been met and patient is ready for discharge.

## 2018-09-22 NOTE — H&P
[facility], [place]    Internal Medicine History and Physical - [facility]       Date of Admission:  9/21/2018    Assessment & Plan   Tad Manning is a 77 year old male admitted on 9/21/2018. He has a h/o DM2, CAD, AFib (on warfarin), and CLL. He is admitted due to elevated BUN and Cr in setting of generalized weakness.    Acute kidney injury  Likely secondary to volume depletion in setting of poor PO intake and continued diuretic use. Also with elevated BUN, elevated INR, and many hyaline casts.  - hold lisinopril, furosemide, digoxin, metformin  - 250 ml bolus, encourage PO intake  - Trend Cr, avoid nephrotoxins    Generalized weakness / lightheaded / falls  Suspect related to volume depletion in setting of poor PO intake and continued diuretic use. Falls appear to occur after standing from sitting, with sensation of lightheadedness. No chest pain or palpitations. Encourage PO intake and hold diuretics.  - hold diuretics and tamsulosin  - telemetry  - up with assistance  - PT/OT in AM    Supratherapeutic INR  Likely secondary to low PO intake.  - hold warfarin, pharm to dose    Other issues:  - DM2: Home regimen is metformin. Medium ISS while inpatient.  - CAD: continue clopidogrel, metoprolol, hold lisinopril  - AFib: continue metoprolol, hold warfarin  - CLL    Diet:  regular  Fluids: bolus  Lines: no  Dugan Catheter: not present    DVT Prophylaxis: Warfarin  Code Status: Prior  Disposition Plan   Expected discharge: 2 - 3 days, recommended to prior living arrangement once renal function improved.     Entered: Russell Joyce MD 09/22/2018, 4:42 AM   Information in the above section will display in the discharge planner report.       The patient was discussed with Dr. Gomez.    Russell Joyce MD, MPH  [facility]   Please see sticky note for cross cover  "information    ----------------------------------------------------------------------------------------------    Chief Complaint   Abnormal kidney function    History is obtained from the patient    History of Present Illness   Tad Manning is a 78 y/o man with CAD, AFib (on warfarin), DM2, CLL, who was told to come to ED by his clinic after labs showed abnormal BUN and Cr. He reports he went for a routine lab draw today for his doctor's visit next week. He says he has been \"losing strength in the legs\" in the past 2-3 weeks. He reports having episodes of lightheadedness. He has fallen a few times, most recently last week after he had gotten up to go to the bathroom. He is unsure if he lost consciousness or not. He is usually able to stabilize himself with his walker. He reports he has been eating less the past 2-3 weeks. He thinks he has probably been eating only two meals a day. For his meals he relies on meals on wheels as well as his \"lady friend\" with whom he lives, though he says she has not been cooking as much recently. He lives on the 3rd floor of an apartment building.    He reports the most recent medication change was the addition of digoxin ~4 weeks ago. He has continued to take lisinopril and furosemide as prescribed. He says he has had swelling in his legs in the past and even was told to wear compression stockings over the summer, but now he says the swelling has completely gone away. He weighs himself every day or two. He says he is normally around 180 lb but was 177 lb two days ago.    He denies fevers/chills. He denies n/v/d. He denies black or bloody stools. He denies poor urine output or dysuria.    Review of Systems   The 10 point Review of Systems is negative other than noted in the HPI or here.     Past Medical History    I have reviewed this patient's medical history and updated it with pertinent information if needed.   Past Medical History:   Diagnosis Date     Arthritis      ASCVD " (arteriosclerotic cardiovascular disease)      BMI 30.0-30.9,adult      BPH (benign prostatic hypertrophy)      Cellulitis      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)      Coronary artery disease     triple bypass 1995     Diabetes mellitus (H)      Diabetic polyneuropathy (H)      History of blood transfusion      Hyperlipidaemia      Hypertension      Noninflammatory pericardial effusion      Osteomyelitis of left foot (H)      PVD (peripheral vascular disease) (H)      Sebaceous cyst         Past Surgical History   I have reviewed this patient's surgical history and updated it with pertinent information if needed.  Past Surgical History:   Procedure Laterality Date     AMPUTATE TOE(S)  1/10/2014    Procedure: AMPUTATE TOE(S);;  Surgeon: Amador Vick MD;  Location:  OR     BONE MARROW BIOPSY, BONE SPECIMEN, NEEDLE/TROCAR  10/19/2012    Procedure: BIOPSY BONE MARROW;  BONE MARROW BIOPSY  (CONSCIOUS SED);  Surgeon: Ramon Quesada MD;  Location:  GI     BYPASS GRAFT FEMOROPOPLITEAL  1/10/2014    Procedure: BYPASS GRAFT FEMOROPOPLITEAL;  Left above knee popliteal to  Below knee popliteal bypass using transverse saphenous vein graft. Left 2nd Toe amputation;  Surgeon: Amador Vick MD;  Location:  OR     CARDIAC SURGERY      angioplasty with stent, triple bypass     EXCISE CYST GENERIC (LOCATION) N/A 2/1/2016    Procedure: EXCISE CYST GENERIC (LOCATION);  Surgeon: Amador Vick MD;  Location:  SD     GRAFT VEIN FROM EXTREMITY (LOCATION)  1/10/2014    Procedure: GRAFT VEIN FROM EXTREMITY (LOCATION);;  Surgeon: Amador Vick MD;  Location:  OR     LAMINECTOMY THORACIC ONE LEVEL N/A 2/8/2017    Procedure: LAMINECTOMY THORACIC ONE LEVEL;  Surgeon: Marcelo Trent MD;  Location: [facility] OR     OPTICAL TRACKING SYSTEM FUSION POSTERIOR SPINE THORACIC THREE+ LEVELS N/A 2/12/2017    Procedure: OPTICAL TRACKING SYSTEM FUSION POSTERIOR SPINE THORACIC  THREE+ LEVELS;  Surgeon: Marcelo Trent MD;  Location: [facility] OR     TONSILLECTOMY       VASCULAR SURGERY      ptcr legs        Social History   Social History   Substance Use Topics     Smoking status: Former Smoker     Packs/day: 2.00     Years: 30.00     Types: Cigarettes     Quit date: 6/19/1995     Smokeless tobacco: Never Used     Alcohol use No       Family History   I have reviewed this patient's family history and updated it with pertinent information if needed.   Family History   Problem Relation Age of Onset     Cancer Mother      leukemia       Prior to Admission Medications   Prior to Admission Medications   Prescriptions Last Dose Informant Patient Reported? Taking?   ALPRAZolam (XANAX) 0.25 MG tablet   Yes No   Sig: TK 1 T PO QHS PRN   Citalopram Hydrobromide (CELEXA PO)   Yes No   Sig: Take 10 mg by mouth daily   MELATONIN PO   Yes No   Sig: Take 5 mg by mouth daily   acetaminophen (TYLENOL) 325 MG tablet   Yes No   Sig: Take 325 mg by mouth every 4 hours as needed for mild pain   atorvastatin (LIPITOR) 20 MG tablet   No No   Sig: TAKE 1 TABLET BY MOUTH EVERY DAY   cholecalciferol 1000 UNITS TABS   No No   Sig: Take 1,000 Units by mouth daily   clopidogrel (PLAVIX) 75 MG tablet   No No   Sig: Take 1 tablet (75 mg) by mouth daily   digoxin (LANOXIN) 125 MCG tablet   No No   Sig: Take 1 tablet (125 mcg) by mouth daily   furosemide (LASIX) 20 MG tablet   No No   Sig: Take 3 tablets (60 mg) by mouth 2 times daily   lisinopril (PRINIVIL/ZESTRIL) 10 MG tablet   Yes No   Sig: Take 1 tablet (10 mg) by mouth daily   metFORMIN (GLUCOPHAGE) 500 MG tablet   No No   Sig: Take 1 tablet (500 mg) by mouth 2 times daily (with meals)   metoprolol succinate (TOPROL-XL) 200 MG 24 hr tablet   No No   Sig: Take 0.5 tablets (100 mg) by mouth daily   nitroglycerin (NITROSTAT) 0.4 MG sublingual tablet   No No   Sig: For chest pain place 1 tablet under the tongue every 5 minutes for 3 doses. If symptoms persist 5 minutes  after 1st dose call 911.   nystatin (MYCOSTATIN) cream   Yes No   Sig: Apply topically 2 times daily   order for DME  Self No No   Sig: Equipment being ordered: DH2 shoe   ranitidine (ZANTAC) 150 MG tablet   No No   Sig: Take 1 tablet (150 mg) by mouth 2 times daily   sodium chloride (EQ SALINE NASAL SPRAY) 0.65 % nasal spray   No No   Sig: Spray 1 spray into both nostrils daily as needed for congestion   spironolactone (ALDACTONE) 25 MG tablet   Yes No   Sig: Take 0.5 tablets (12.5 mg) by mouth daily   tamsulosin (FLOMAX) 0.4 MG capsule   No No   Sig: Take 2 capsules (0.8 mg) by mouth At Bedtime   warfarin (COUMADIN) 5 MG tablet   No No   Sig: Take 1-1.5 tablets or as directed by coumadin clinic.   warfarin (COUMADIN) 5 MG tablet   No No   Sig: Take 1 tablet (5 mg) by mouth daily Dose adjusted per Coumadin Clinic.      Facility-Administered Medications: None     Allergies   Allergies   Allergen Reactions     Blood Transfusion Related (Informational Only) Other (See Comments)     Patient has a history of a clinically significant antibody against RBC antigens.  A delay in compatible RBCs may occur.        Physical Exam   Vital Signs: Temp: 98.1  F (36.7  C) Temp src: Oral BP: 107/68 Pulse: 92 Heart Rate: 75 Resp: 16 SpO2: 100 % O2 Device: None (Room air)    Weight: 182 lbs 0 oz    General Appearance: NAD  Eyes: PERRL, EOMI  HEENT: no oral ulcers or pharyngeal erythema  Respiratory: CTAB  Cardiovascular: RRR  GI: soft, NT/ND, active bowel sounds  Genitourinary: no CVA tenderness  Skin: no rash, 8 cm laceration on R anterior leg, no purulence or erythema  Musculoskeletal: no swelling      Data   Data     Recent Labs  Lab 09/22/18  0042   WBC 7.9   HGB 10.4*   MCV 92   PLT 99*   INR 4.00*   *   POTASSIUM 4.5   CHLORIDE 96   CO2 24   BUN 95*   CR 2.23*   ANIONGAP 12   JUSTINO 9.7   *   ALBUMIN 4.1   PROTTOTAL 8.0   BILITOTAL 0.9   ALKPHOS 126   ALT 27   AST 11     Recent Results (from the past 24 hour(s))   POC  US Retroperitoneal Limited    Impression    [facility] Procedure Note    Limited Bedside ED Renal Ultrasound:    PERFORMED BY: Dr. Jude Chirinos  INDICATIONS:  Acute kidney injury  PROBE: Low frequency convex probe  BODY LOCATION:  Abdomen  FINDINGS:  The ultrasound was performed with longitudinal and transverse views.   Right Kidney:   Hydronephrosis:  None   Renal cyst:  None  Left Kidney:   Hydronephrosis:  None   Renal cyst:  Single  INTERPRETATION:  No hydronephrosis, single simple cyst in left kidney.   IMAGE DOCUMENTATION: Images were archived to PACs system.    [facility] Procedure Note      Limited Bedside ED Ultrasound of the Urinary Bladder:    PERFORMED BY: Dr. Jude Chirinos  INDICATIONS:  Acute kidney injury  PROBE: Low frequency convex probe  BODY LOCATION:  Abdomen  FINDINGS:  Visualization of the bladder in longitudinal and transverse views demonstrated a mildly full state post-void.  The bladder dimensions measured: 8.1 cm width X 7.6 height cm X 5.1 cm length. Calculated volume 255 ml. Incidental prostate hypertrophy noted.   INTERPRETATION:  Total calculated volume was estimated at 255 ml post-void.    IMAGE DOCUMENTATION: Images were archived to PACs system.

## 2018-09-23 LAB
ANION GAP SERPL CALCULATED.3IONS-SCNC: 7 MMOL/L (ref 3–14)
BUN SERPL-MCNC: 70 MG/DL (ref 7–30)
CALCIUM SERPL-MCNC: 9.3 MG/DL (ref 8.5–10.1)
CHLORIDE SERPL-SCNC: 102 MMOL/L (ref 94–109)
CO2 SERPL-SCNC: 25 MMOL/L (ref 20–32)
CREAT SERPL-MCNC: 1.72 MG/DL (ref 0.66–1.25)
ERYTHROCYTE [DISTWIDTH] IN BLOOD BY AUTOMATED COUNT: 14.6 % (ref 10–15)
GFR SERPL CREATININE-BSD FRML MDRD: 39 ML/MIN/1.7M2
GLUCOSE BLDC GLUCOMTR-MCNC: 210 MG/DL (ref 70–99)
GLUCOSE BLDC GLUCOMTR-MCNC: 225 MG/DL (ref 70–99)
GLUCOSE BLDC GLUCOMTR-MCNC: 259 MG/DL (ref 70–99)
GLUCOSE BLDC GLUCOMTR-MCNC: 272 MG/DL (ref 70–99)
GLUCOSE BLDC GLUCOMTR-MCNC: 337 MG/DL (ref 70–99)
GLUCOSE BLDC GLUCOMTR-MCNC: 368 MG/DL (ref 70–99)
GLUCOSE SERPL-MCNC: 263 MG/DL (ref 70–99)
HCT VFR BLD AUTO: 30.2 % (ref 40–53)
HGB BLD-MCNC: 10.2 G/DL (ref 13.3–17.7)
INR PPP: 3.46 (ref 0.86–1.14)
MCH RBC QN AUTO: 31.4 PG (ref 26.5–33)
MCHC RBC AUTO-ENTMCNC: 33.8 G/DL (ref 31.5–36.5)
MCV RBC AUTO: 93 FL (ref 78–100)
PLATELET # BLD AUTO: 92 10E9/L (ref 150–450)
POTASSIUM SERPL-SCNC: 4.8 MMOL/L (ref 3.4–5.3)
RBC # BLD AUTO: 3.25 10E12/L (ref 4.4–5.9)
SODIUM SERPL-SCNC: 134 MMOL/L (ref 133–144)
WBC # BLD AUTO: 6.8 10E9/L (ref 4–11)

## 2018-09-23 PROCEDURE — 25000132 ZZH RX MED GY IP 250 OP 250 PS 637: Mod: GY | Performed by: INTERNAL MEDICINE

## 2018-09-23 PROCEDURE — 85610 PROTHROMBIN TIME: CPT | Performed by: INTERNAL MEDICINE

## 2018-09-23 PROCEDURE — 80048 BASIC METABOLIC PNL TOTAL CA: CPT | Performed by: INTERNAL MEDICINE

## 2018-09-23 PROCEDURE — A9270 NON-COVERED ITEM OR SERVICE: HCPCS | Mod: GY | Performed by: PHYSICIAN ASSISTANT

## 2018-09-23 PROCEDURE — 12000001 ZZH R&B MED SURG/OB UMMC

## 2018-09-23 PROCEDURE — 25000132 ZZH RX MED GY IP 250 OP 250 PS 637: Mod: GY | Performed by: PHYSICIAN ASSISTANT

## 2018-09-23 PROCEDURE — 99233 SBSQ HOSP IP/OBS HIGH 50: CPT | Performed by: INTERNAL MEDICINE

## 2018-09-23 PROCEDURE — 85027 COMPLETE CBC AUTOMATED: CPT | Performed by: INTERNAL MEDICINE

## 2018-09-23 PROCEDURE — 25000131 ZZH RX MED GY IP 250 OP 636 PS 637: Mod: GY | Performed by: PHYSICIAN ASSISTANT

## 2018-09-23 PROCEDURE — 36415 COLL VENOUS BLD VENIPUNCTURE: CPT | Performed by: INTERNAL MEDICINE

## 2018-09-23 PROCEDURE — A9270 NON-COVERED ITEM OR SERVICE: HCPCS | Mod: GY | Performed by: INTERNAL MEDICINE

## 2018-09-23 PROCEDURE — 00000146 ZZHCL STATISTIC GLUCOSE BY METER IP

## 2018-09-23 PROCEDURE — 99207 ZZC APP CREDIT; MD BILLING SHARED VISIT: CPT | Performed by: PHYSICIAN ASSISTANT

## 2018-09-23 RX ORDER — METOPROLOL SUCCINATE 100 MG/1
100 TABLET, EXTENDED RELEASE ORAL DAILY
Status: DISCONTINUED | OUTPATIENT
Start: 2018-09-23 | End: 2018-09-24 | Stop reason: HOSPADM

## 2018-09-23 RX ORDER — WARFARIN SODIUM 2 MG/1
2 TABLET ORAL
Status: COMPLETED | OUTPATIENT
Start: 2018-09-23 | End: 2018-09-23

## 2018-09-23 RX ADMIN — INSULIN ASPART 4 UNITS: 100 INJECTION, SOLUTION INTRAVENOUS; SUBCUTANEOUS at 17:10

## 2018-09-23 RX ADMIN — INSULIN ASPART 3 UNITS: 100 INJECTION, SOLUTION INTRAVENOUS; SUBCUTANEOUS at 07:48

## 2018-09-23 RX ADMIN — CLOPIDOGREL 75 MG: 75 TABLET, FILM COATED ORAL at 07:46

## 2018-09-23 RX ADMIN — WARFARIN SODIUM 2 MG: 2 TABLET ORAL at 17:11

## 2018-09-23 RX ADMIN — ATORVASTATIN CALCIUM 20 MG: 20 TABLET, FILM COATED ORAL at 21:10

## 2018-09-23 RX ADMIN — INSULIN ASPART 5 UNITS: 100 INJECTION, SOLUTION INTRAVENOUS; SUBCUTANEOUS at 12:01

## 2018-09-23 RX ADMIN — VITAMIN D, TAB 1000IU (100/BT) 1000 UNITS: 25 TAB at 07:46

## 2018-09-23 RX ADMIN — RANITIDINE 150 MG: 150 TABLET, FILM COATED ORAL at 07:46

## 2018-09-23 RX ADMIN — CITALOPRAM HYDROBROMIDE 10 MG: 10 TABLET ORAL at 07:46

## 2018-09-23 RX ADMIN — METOPROLOL SUCCINATE 100 MG: 100 TABLET, EXTENDED RELEASE ORAL at 09:09

## 2018-09-23 ASSESSMENT — ACTIVITIES OF DAILY LIVING (ADL)
ADLS_ACUITY_SCORE: 16
ADLS_ACUITY_SCORE: 18
ADLS_ACUITY_SCORE: 16
ADLS_ACUITY_SCORE: 18

## 2018-09-23 NOTE — SUMMARY OF CARE
Pt came to 5B with the following belongings: eye glass,pair of shoes, walker, one shirt, cell phone, charge and sweat shirt and jacket

## 2018-09-23 NOTE — PROGRESS NOTES
"[facility], [place]    Internal Medicine Progress Note - Gold Service      Assessment & Plan   Tad Manning is a 77 year old male admitted on 9/21/2018. He presents with HTN, HFrEF, HLD, CAD s/p 3-vessel CABG (1995) and PCI to mLAD (3/2018), PAD s/p ileal artery stents, persistent atrial fibrillation, DM type II, CKD stage III, CLL (in remission), BPH, GERD, and anxiety and is admitted for STORM.    STORM on CKD. Obtained routine labs in clinic where he was notified of abnormal creatinine results and subsequently presented to the ED. Creatinine 2.23 in ED, recent BL ~ 1.4. UA with 35 hyaline casts. FeUrea 16% consistent with pre-renal etiology. Received 750 ml IVF (EF 25%) with subsequent Cr improved to 1.72 (1.91) this AM. Most likely related to hypovolemia with poor PO intake (possibly driven by cardiac medications). Patient denies urinary concerns.  - Encourage PO intake  - Judicious use of IVF given significant HF  - Holding PTA diuretics, lisinopril  - Avoid nephrotoxic agents  - Trend BMP    Mechanical Falls. Reports sustaining mechanical falls over the past several months, more frequently over the past few weeks. States his \"knees give out\". Believes he may have hit his head. CT head negative 9/22. Suspect related to hypovolemia with poor PO intake PTA, ?contribution from cardiac medications. Orthostatic vitals this admission consistent with orthostatic HOTN.  - Encourage PO intake  - PT/OT consults - per PT, safe to return home but would recommend OP PT  - Fall precautions  - Slow when going from sit to stand    2nd Degree AV Block. Noted to have abnormal conduction on telemetry 9/22. Obtained EKG which revealed sinus rhythm with 2nd degree AV block, appears to have been present on previous EKG back in 7/2018. Will continue holding digoxin as risk appears to outweigh benefit at this time. Recommend close follow up with OP cardiology.  - Dixogin on hold, no plan to resume " given findings of 2nd-degree heart block  - Follow up with cardiology as scheduled 10/22    Supratherapeutic INR, Persistent Atrial Fibrillation, HTN. PTA regimen includes lisinopril, furosemide, spironolactone, and metoprolol. Recently sarted on digoxin 7/17, appears was not renally dosed (patient with borderline GFR <50 which would align with digoxin dose of 0.0625 mg daily, was prescribed 0.125 mg daily). Digoxin level therapeutic at 1.5, EKG without evidence of digoxin toxicity although noted heart block per above. Patient anticoagulated with warfarin, INR supratherapeutic at 4.95 in setting of poor PO intake; improved to 3.46 this AM with holding warfarin. BP's well-controlled since admission despite holding lisinopril, patient in RRR on exam.  - Continue metoprolol 100 mg daily  - Holding digoxin per above  - Hold diuretics, lisinopril for now  - Warfarin dosing per pharmacy  - Daily INR    HFrEF. Echo 7/2018 with severely reduced EF, LVEF of 25%, RV dilatation with systolic dysfunction. Maintained on furosemide, spironolactone, and metoprolol per above. Patient currently at baseline weight of 180 lbs, no LE edema with holding diuretics.  - Continue holding diuretics - will likely need reduced doses on discharge  - I&O, daily weights  - Trend BMP    DM Type II. HgbA1C 9.0% on admission. Home regimen includes metformin 500 mg BID which has been on hold, patient started on sliding scale. BG elevated since admission, ranging 210-360's over past 24 hours  - Holding metformin  - Increase to high sliding scale insulin  - BG checks qAC, at bedtime  - Hypoglycemia protocol    CAD, HLD, PAD. S/p 3-vessel CABG in 1995 and PCI to mLAD 3/2018. Also hx of ileal artery stents. Maintained on plavix and atorvastatin PTA.  - Continue plavix 75 mg daily  - Continue atorvastatin 20 mg daily    BPH. Maintained on tamsulosin PTA, held on admission due to STORM and recurrent falls. No urinary concerns currently.  - Continue holding  tamsulosin    GERD. Maintained on ranitidine PTA, continue.    Anxiety. Home regimen includes citalopram and PRN alprazolam. Mood currently stable.  - Continue citalopram 10 mg daily  - Holding alprazolam given fall risk, would recommend discontinuing going forward    Diet: Moderate Consistent CHO Diet  Fluids: Encourage PO; patient with hx of significant HF  Dugan Catheter: not present    DVT Prophylaxis: Warfarin  Code Status: Full Code    Disposition Plan   Expected discharge: 1-2 days, recommended to prior living arrangement once renal function at baseline, BG controlled, and safe disposition plan identified.     Entered: Ivet Mcclure PA-C 09/23/2018, 7:30 AM   Information in the above section will display in the discharge planner report.      The patient's care was discussed with the Attending Physician, Dr. Santiago.    Ivet Mcclure PA-C  Internal Medicine Staff Hospitalist Service  [facility]  Pager: [phone]  Please see sticky note for cross cover information    Interval History   Tad is feeling much better this morning. Notes the dizziness has subsided and he has been eating/drinking more. Feeling somewhat stronger and wishes to pursue cardiac rehab as an outpatient to continue working on strength. Otherwise no chest pain, SOB, nausea, vomiting.    Data reviewed today: I reviewed all medications, new labs and imaging results over the last 24 hours. I personally reviewed no images or EKG's today.    Physical Exam   Vital Signs: Temp: 98.4  F (36.9  C) Temp src: Oral BP: 104/48 Pulse: 74 Heart Rate: 70 Resp: 16 SpO2: 99 % O2 Device: None (Room air)    Weight: 182 lbs 0 oz  General Appearance: Elderly  male ambulating around room, NAD.  Respiratory: Respiratory effort normal on RA. Lungs CTAB without rales, rhonchi, or wheezing.  Cardiovascular: RRR, S1/S2. No murmurs, rubs, or gallops.  GI: Abdomen soft, non-distended, non-tender. Bowel sounds  normoactive.  Skin: Multiple contusions along upper extremities. Otherwise no jaundice, rashes, or lesions evident on exposed skin.  Extremities: No peripheral edema.      Data   Data     Recent Labs  Lab 09/22/18  0710 09/22/18  0042   WBC  --  7.9   HGB  --  10.4*   MCV  --  92   PLT  --  99*   INR 4.95* 4.00*    132*   POTASSIUM 4.4 4.5   CHLORIDE 101 96   CO2 25 24   BUN 82* 95*   CR 1.91* 2.23*   ANIONGAP 8 12   JUSTINO 8.8 9.7   * 303*   ALBUMIN  --  4.1   PROTTOTAL  --  8.0   BILITOTAL  --  0.9   ALKPHOS  --  126   ALT  --  27   AST  --  11

## 2018-09-23 NOTE — PLAN OF CARE
Problem: Patient Care Overview  Goal: Plan of Care/Patient Progress Review  Outcome: Improving  Afebrile, VSS. Pt is alert & oriented x 4.  O2 sats 98% on room air.   Consistent Carb diet= good po. No complaints of pain/ nausea.   Up to bathroom with SBA and 4 wheeled walker. Pt is transferring to  at 1700.

## 2018-09-23 NOTE — PLAN OF CARE
Problem: Patient Care Overview  Goal: Plan of Care/Patient Progress Review  Pt alert and oriented this shift. Pt's vitals WNL except HR-pt lesvia for this nurse. No reports of pain this shift. Pt is stand by assist with his walker in room. Pt no longer feeling dizzy. Pt tolerated diet and fluids without any issues. Pt able to make needs known. Call light within reach. Will continue to monitor.

## 2018-09-23 NOTE — PROGRESS NOTES
D/I  Received in tx from 6D Observation Unit to 5B Medicine floor at 1750 pm, as was transferred to In patient status.  Alert & Oriented and brought up in a W/C.  Continue with POC.

## 2018-09-23 NOTE — PLAN OF CARE
Problem: Patient Care Overview  Goal: Plan of Care/Patient Progress Review  Patient A&O, AVSS /58  Pulse 74  Temp 97.9  F (36.6  C) (Oral)  Resp 16  Ht 1.829 m (6')  Wt 82.6 kg (182 lb)  SpO2 100%  BMI 24.68 kg/m2  Positive orthostatic BP with standing this am, patient admitted to lightheadedness at that time. A1/SBA with walker to bathroom. Voiding good amounts. Tolerating CHO Diet. 's with sliding scale coverage per MAR. Patient remains on Tele, patient in SR HR 60-80's, had episode with 2nd degree AV Block, patient asymptomatic, denies pain, light-headedness at the time. Hx of Afib on warfarin, INR elevated at 4.95, no warfarin given today. Patient remains on Fall Precautions. Cr was elevated at 2.23 and trending down to 1.91, will continue to monitor. Luz Elenaece here to visit. Informed of Inpatient status. Patient ambulated in hallway twice this shift. Will continue POC.

## 2018-09-24 ENCOUNTER — PRE VISIT (OUTPATIENT)
Dept: CARDIOLOGY | Facility: CLINIC | Age: 78
End: 2018-09-24

## 2018-09-24 VITALS
TEMPERATURE: 97.6 F | SYSTOLIC BLOOD PRESSURE: 127 MMHG | RESPIRATION RATE: 16 BRPM | HEIGHT: 72 IN | HEART RATE: 58 BPM | WEIGHT: 180.8 LBS | BODY MASS INDEX: 24.49 KG/M2 | DIASTOLIC BLOOD PRESSURE: 52 MMHG | OXYGEN SATURATION: 98 %

## 2018-09-24 DIAGNOSIS — I50.22 CHRONIC SYSTOLIC HEART FAILURE (H): Primary | ICD-10-CM

## 2018-09-24 LAB
ANION GAP SERPL CALCULATED.3IONS-SCNC: 8 MMOL/L (ref 3–14)
BUN SERPL-MCNC: 55 MG/DL (ref 7–30)
CALCIUM SERPL-MCNC: 9.1 MG/DL (ref 8.5–10.1)
CHLORIDE SERPL-SCNC: 103 MMOL/L (ref 94–109)
CO2 SERPL-SCNC: 23 MMOL/L (ref 20–32)
CREAT SERPL-MCNC: 1.49 MG/DL (ref 0.66–1.25)
GFR SERPL CREATININE-BSD FRML MDRD: 46 ML/MIN/1.7M2
GLUCOSE BLDC GLUCOMTR-MCNC: 206 MG/DL (ref 70–99)
GLUCOSE BLDC GLUCOMTR-MCNC: 219 MG/DL (ref 70–99)
GLUCOSE BLDC GLUCOMTR-MCNC: 232 MG/DL (ref 70–99)
GLUCOSE SERPL-MCNC: 215 MG/DL (ref 70–99)
INR PPP: 3.08 (ref 0.86–1.14)
INTERPRETATION ECG - MUSE: NORMAL
INTERPRETATION ECG - MUSE: NORMAL
POTASSIUM SERPL-SCNC: 4.5 MMOL/L (ref 3.4–5.3)
SODIUM SERPL-SCNC: 134 MMOL/L (ref 133–144)

## 2018-09-24 PROCEDURE — 99207 ZZC APP CREDIT; MD BILLING SHARED VISIT: CPT | Performed by: PHYSICIAN ASSISTANT

## 2018-09-24 PROCEDURE — A9270 NON-COVERED ITEM OR SERVICE: HCPCS | Mod: GY | Performed by: INTERNAL MEDICINE

## 2018-09-24 PROCEDURE — 36415 COLL VENOUS BLD VENIPUNCTURE: CPT | Performed by: INTERNAL MEDICINE

## 2018-09-24 PROCEDURE — 00000146 ZZHCL STATISTIC GLUCOSE BY METER IP

## 2018-09-24 PROCEDURE — 25000132 ZZH RX MED GY IP 250 OP 250 PS 637: Mod: GY | Performed by: PHYSICIAN ASSISTANT

## 2018-09-24 PROCEDURE — 25000128 H RX IP 250 OP 636: Performed by: INTERNAL MEDICINE

## 2018-09-24 PROCEDURE — G0463 HOSPITAL OUTPT CLINIC VISIT: HCPCS

## 2018-09-24 PROCEDURE — 25000132 ZZH RX MED GY IP 250 OP 250 PS 637: Mod: GY | Performed by: INTERNAL MEDICINE

## 2018-09-24 PROCEDURE — A9270 NON-COVERED ITEM OR SERVICE: HCPCS | Mod: GY | Performed by: PHYSICIAN ASSISTANT

## 2018-09-24 PROCEDURE — 80048 BASIC METABOLIC PNL TOTAL CA: CPT | Performed by: INTERNAL MEDICINE

## 2018-09-24 PROCEDURE — 99239 HOSP IP/OBS DSCHRG MGMT >30: CPT | Performed by: INTERNAL MEDICINE

## 2018-09-24 PROCEDURE — 85610 PROTHROMBIN TIME: CPT | Performed by: INTERNAL MEDICINE

## 2018-09-24 PROCEDURE — 90662 IIV NO PRSV INCREASED AG IM: CPT | Performed by: INTERNAL MEDICINE

## 2018-09-24 RX ORDER — FUROSEMIDE 20 MG
40 TABLET ORAL 2 TIMES DAILY
Qty: 180 TABLET | Refills: 3
Start: 2018-09-24 | End: 2018-09-27

## 2018-09-24 RX ORDER — WARFARIN SODIUM 2.5 MG/1
2.5 TABLET ORAL
Status: DISCONTINUED | OUTPATIENT
Start: 2018-09-24 | End: 2018-09-24 | Stop reason: HOSPADM

## 2018-09-24 RX ORDER — WARFARIN SODIUM 5 MG/1
TABLET ORAL
Qty: 45 TABLET | Refills: 1
Start: 2018-09-24 | End: 2018-11-01

## 2018-09-24 RX ADMIN — Medication 5 MG: at 02:06

## 2018-09-24 RX ADMIN — CITALOPRAM HYDROBROMIDE 10 MG: 10 TABLET ORAL at 08:26

## 2018-09-24 RX ADMIN — VITAMIN D, TAB 1000IU (100/BT) 1000 UNITS: 25 TAB at 08:26

## 2018-09-24 RX ADMIN — CLOPIDOGREL 75 MG: 75 TABLET, FILM COATED ORAL at 08:26

## 2018-09-24 RX ADMIN — INSULIN ASPART 5 UNITS: 100 INJECTION, SOLUTION INTRAVENOUS; SUBCUTANEOUS at 12:55

## 2018-09-24 RX ADMIN — INSULIN ASPART 4 UNITS: 100 INJECTION, SOLUTION INTRAVENOUS; SUBCUTANEOUS at 08:26

## 2018-09-24 RX ADMIN — METOPROLOL SUCCINATE 100 MG: 100 TABLET, EXTENDED RELEASE ORAL at 08:26

## 2018-09-24 RX ADMIN — INFLUENZA A VIRUS A/MICHIGAN/45/2015 X-275 (H1N1) ANTIGEN (FORMALDEHYDE INACTIVATED), INFLUENZA A VIRUS A/SINGAPORE/INFIMH-16-0019/2016 IVR-186 (H3N2) ANTIGEN (FORMALDEHYDE INACTIVATED), AND INFLUENZA B VIRUS B/MARYLAND/15/2016 BX-69A (A B/COLORADO/6/2017-LIKE VIRUS) ANTIGEN (FORMALDEHYDE INACTIVATED) 0.5 ML: 60; 60; 60 INJECTION, SUSPENSION INTRAMUSCULAR at 10:51

## 2018-09-24 RX ADMIN — RANITIDINE 150 MG: 150 TABLET, FILM COATED ORAL at 08:26

## 2018-09-24 ASSESSMENT — ACTIVITIES OF DAILY LIVING (ADL)
ADLS_ACUITY_SCORE: 16

## 2018-09-24 NOTE — PROGRESS NOTES
"  Care Coordinator Progress Note    Admission Date/Time:  9/21/2018  Attending MD:  Jeffry Santiago, *    Data  Chart reviewed, discussed with interdisciplinary team.   Patient was admitted for:    STORM (acute kidney injury) (H)  Chronic lymphoid leukemia, without mention of having achieved remission(204.10) (H)  Diabetic polyneuropathy associated with type 2 diabetes mellitus (H)  Encounter for long-term (current) use of insulin (H).    Concerns with insurance coverage for discharge needs: None.  Current Living Situation: Patient lives with adult .  Support System: Supportive and Involved  Services Involved: Meals on Wheels  Transportation at Discharge: Family or friend will provide  Transportation to Medical Appointments: - Name of caregiver: family  Barriers to Discharge: medical needs    Coordination of Care    D: Chart reviewed and plan of care discussed with MD team.   I/A: Met with patient at bedside to discuss discharge needs recommended by WOC RN and PT. Patient is open to Meals on Wheels, has generalized weakness and reduced overall activity tolerance; qualifies for homebound status. Patient is agreeable to Home Care RN and Therapy; he would like the agency he had before but doesn't remember the name. Pt requested I call his primary clinic and ask if they know; called [facility] they said patient used to be open to [facility]. Orders completed.  P: Care Coordinator will remain available for discharge needs that may arise.       Baseline Home Assessment:  He is usually able to stabilize himself with his walker. He reports he has been eating less the past 2-3 weeks. He thinks he has probably been eating only two meals a day. For his meals he relies on meals on wheels as well as his \"lady friend\" with whom he lives, though he says she has not been cooking as much recently. He lives on the 3rd floor of an apartment building.    Referrals: Provided patient/family with options for " Outpatient Rehab.        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  Apply dab of iodosorb to wound bed and cover with small primipore.         [address] [address]  Phone: [phone]  Fax: [phone]    RN skilled nursing visit.   RN to assess vital signs and weight, respiratory and cardiac status, hydration, nutrition and bowel status  RN to complete home safety eval  RN to assess and teach medication management, setup and storage.  RN to provide wound assessment and care every visit (see wound care orders). RN to contact PCP if increased sign/symptoms of infection noted.    PT to eval and treat  OT to eval and treat    Plan  Anticipated Discharge Date:  Tues/Wed  Anticipated Discharge Plan:  Home w/sig other    Yael Arauz RN, BSN, PHN  Medicine Care Coordinator  [facility] and [facility]  Desk Phone: [phone]  Pager: [phone]    To contact Weekend RN[facility], dial [phone] and enter job code [phone] at prompt.   This pager can not be contacted by text page or outside line.

## 2018-09-24 NOTE — DISCHARGE SUMMARY
[facility], [place]    Internal Medicine Discharge Summary- Gold Service    Date of Admission:  9/21/2018  Date of Discharge:  9/24/2018  Discharging Provider: Fatou Maldonado/ Dr. Santiago   Discharge Team: Gold 9    Discharge Diagnoses   Acute on chronic kidney disease     Mechanical falls   2nd Degree AV block   Supratherapeutic INR  Persistent Atrial Fibrillation  HTN  HFrEF  DM Type II   CAD   HLD  PAD  GERD  Anxiety    Follow-ups Needed After Discharge   -Follow up BMP in 2 days with follow up appointment.   -[facility] follow up on Sep 27, 2018 at 1:00 PM (12:30 PM lab draw).   -Spironolactone held on discharge, please resume on follow up if creatinine stable and appropriate.   -Furosemide dose decreased, monitor weights.   -Metformin dose resumed on discharge, please discuss addition of sulfonylurea if appropriate.    Hospital Course   Tad Manning is a 77 year old male admitted on 9/21/2018. He has a history of HTN, HFrEF at 20%, HLD, CAD s/p 3-vessel CABG (1995) and PCI to mLAD (3/2018), PAD s/p ileal artery stents, persistent atrial fibrillation, DM type II, CKD stage III, CLL (in remission), BPH, GERD, and anxiety and is admitted for STORM. The following were addressed during his hospitalization.      #STORM on CKD- PTA patient had obtained routine labs in clinic where he was notified of abnormal creatinine results and subsequently presented to the ED. Creatinine 2.23 in ED, recent baseline had been about 1.4 (since 5/2018). UA on admission had 35 hyaline casts. FeUrea 16% consistent with pre-renal etiology. His diuretics and lisinopril were held and he he received 750 ml IVF. He subsequenting had down trend in his creatinine to 1.49 on day of discharge. Most likely cause of STORM was hypovolemia with poor PO intake (possibly driven by cardiac medications). He will follow up with his PCP 9/26/18 with recheck of BMP.     #Mechanical Falls. Reports sustaining mechanical falls  "over the past several months, more frequently over the past few weeks. He reported that his \"knees give out\". On admission he believed he may have hit his head. CT head negative 9/22. Suspect related to hypovolemia with poor PO intake PTA, and possible over diuresing. Orthostatic vitals this admission consistent with orthostatic HOTN. Patient was evaluated by PT and OT who recommended to continue working with outpatient PT. If patient continues to have multiple falls, may consider event monitor to evaluate for arrhythmias.     #2nd Degree AV Block. Noted to have abnormal conduction on telemetry 9/22. Obtained EKG which revealed sinus rhythm with 2nd degree AV block, appears to have been present on previous EKG back in 7/2018. His digoxin was held on admission, however after discussion with cardiology, resumed on discharge. His primary cardiologist is Dr. Valdes. Recommend close follow up with OP cardiology, next appointment 10/22.      #Supratherapeutic INR  #Persistent Atrial Fibrillation  #HTN   PTA regimen includes lisinopril, furosemide, spironolactone, and metoprolol. Recently sarted on digoxin 7/17, appears was not renally dosed (patient with borderline GFR <50 which would align with digoxin dose of 0.0625 mg daily, was prescribed 0.125 mg daily). Digoxin level therapeutic at 1.5, EKG without evidence of digoxin toxicity although noted heart block per above. Patient anticoagulated with warfarin, INR supratherapeutic at 4.95 on admission in setting of poor PO intake and improved to 3.08 on day of discharge. BP's well-controlled since admission despite holding lisinopril. Digoxin was held during admission, however after discussion with cardiology, recommended resuming digoxin at discharge.  He intermittently went into rate controlled atrial fibrillation during admission.     #HFrEF   Echo 7/2018 with severely reduced EF, LVEF of 25%, RV dilatation with systolic dysfunction. Maintained on furosemide, " spironolactone, and metoprolol per above. Patient currently at baseline weight of 180 lbs. After discussion over the phone with cardiology, recommended resuming furosemide at lower dose (40 mg BID). Will continue to hold spironolactone. Patient referred to follow up with [facility] clinic in 1-2 weeks. He was educated on daily weights and calling cardiology with increase of weight of >2 lbs in one day or >5 lbs in one week.     #DM Type II   HgbA1C 9.0% on admission. Home regimen includes metformin 500 mg BID which was held on admission and sliding scale insulin was used. Blood glucose during admission was elevated during admission, insulin was adjusted accordingly. On discharge, metformin 500 mg BID was resumed. Recommended to follow up with primary and consider initiating sulfonylurea as indicated.      #CAD   #HLD  #PAD   S/p 3-vessel CABG in 1995 and PCI to mLAD 3/2018. Also hx of ileal artery stents. Maintained on plavix and atorvastatin which were continued during hospitalization.      #BPH   Maintained on tamsulosin PTA which was held on admission due to STORM and recurrent falls. He had no urinary concerns during admission. Tamsulosin was held on discharge.       #GERD  Maintained on ranitidine PTA.     #Anxiety  He was continued on his home regimen of citalopram and PRN alprazolam was held given fall risk.     Consultations This Hospital Stay   PHARMACY TO DOSE WARFARIN  PHYSICAL THERAPY ADULT IP CONSULT  OCCUPATIONAL THERAPY ADULT IP CONSULT  WOUND OSTOMY CONTINENCE NURSE  IP CONSULT     Code Status   Full Code    Time Spent on this Encounter   IFatou, personally saw the patient today and spent greater than 30 minutes discharging this patient.       DICK Mario  Internal Medicine Staff Hospitalist Service  [facility]  Pager: [phone]  ______________________________________________________________________    Physical Exam   Blood pressure 127/52, pulse 58, temperature  97.6  F (36.4  C), temperature source Oral, resp. rate 16, height 1.829 m (6'), weight 82 kg (180 lb 12.8 oz), SpO2 98 %.  GENERAL: Alert and oriented x 3. NAD.  HEENT: Anicteric sclera. PERRL. Mucous membranes moist and without lesions.   CV: RRR. S1, S2. No murmurs appreciated. No JVD appreciated.   RESPIRATORY: Effort normal on RA. Lungs CTAB with no wheezing, rales, rhonchi.   GI: Abdomen soft and non distended, bowel sounds present. No tenderness, rebound, guarding.   MUSCULOSKELETAL: No joint swelling or tenderness. Moves all extremities.   NEUROLOGICAL: No focal deficits. Strength 5/5 bilaterally in upper and lower extremities.   EXTREMITIES: No peripheral edema. Intact bilateral pedal pulses.   SKIN: No jaundice. No rashes. Wound of right plantar foot.       Significant Results and Procedures   Most Recent 3 CBC's:  Recent Labs   Lab Test  09/23/18   0743  09/22/18   0042  03/07/18   0453   WBC  6.8  7.9  4.2   HGB  10.2*  10.4*  11.4*   MCV  93  92  90   PLT  92*  99*  98*     Most Recent 3 BMP's:  Recent Labs   Lab Test  09/24/18   0452  09/23/18   0743  09/22/18   0710   NA  134  134  134   POTASSIUM  4.5  4.8  4.4   CHLORIDE  103  102  101   CO2  23  25  25   BUN  55*  70*  82*   CR  1.49*  1.72*  1.91*   ANIONGAP  8  7  8   JUSTINO  9.1  9.3  8.8   GLC  215*  263*  285*     Most Recent 3 INR's:  Recent Labs   Lab Test  09/24/18   0452  09/23/18   0743  09/22/18   0710   INR  3.08*  3.46*  4.95*     Digoxin level 9/22: 1.5        Primary Care Physician   Gene Guevara    Discharge Disposition   Discharged to home with home PT.   Condition at discharge: Stable    Discharge Orders     Home care nursing referral     INR Clinic Referral     Wound care and dressings   Instructions to care for your wound at home: daily dressing changes.    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  Apply dab of iodosorb to wound bed and cover with small primipore.      MD face to face encounter   Documentation of Face to Face and Certification for Home Health Services    I certify that patient: Tad Manning is under my care and that I, or a nurse practitioner or physician's assistant working with me, had a face-to-face encounter that meets the physician face-to-face encounter requirements with this patient on: September 24, 2018.    This encounter with the patient was in whole, or in part, for the following medical condition, which is the primary reason for home health care: STORM.    I certify that, based on my findings, the following services are medically necessary home health services: Nursing, Occupational Therapy and Physical Therapy.    My clinical findings support the need for the above services because: Nurse is needed: To provide assessment and oversight required in the home to assure adherence to the medical plan due to: medical needs. Occupational Therapy Services are needed to assess and treat cognitive ability and address ADL safety due to impairment in mobility and ADL completion. Physical Therapy Services are needed to assess and treat the following functional impairments: generalized weakness and reduction in his overall activity tolerance.    Further, I certify that my clinical findings support that this patient is homebound (i.e. absences from home require considerable and taxing effort and are for medical reasons or [facility] services or infrequently or of short duration when for other reasons) because: Requires assistance of another person or specialized equipment to access medical services because patient: Range of motion limitations prevents ability to exit home safely...    Based on the above findings. I certify that this patient is confined to the home and needs intermittent skilled nursing care, physical therapy and/or speech therapy.  The patient is under my care, and I have initiated the establishment of the plan of care.  This patient will be  followed by a physician who will periodically review the plan of care.  Physician/Provider to provide follow up care: Gene Guevara    Attending hospital physician (the Medicare certified [place] provider): Jeffry Santiago, *  Physician Signature: See electronic signature associated with these discharge orders.  Date: 9/24/2018     Reason for your hospital stay   You were hospitalized due to a decline in your kidney function. This was likely due to dehydration and over diuresing with your cardiac medications. Your kidney function was at your baseline when you discharged. Some of your medications were held on discharge. Please be cautions of these medications changes.     Follow Up and recommended labs and tests   Follow up with primary care provider, Gene Guevara, on Wednesday, Sep 26, to evaluate medication change, for hospital follow- up and to follow up on results. The following labs/tests are recommended: BMP, INR.     Activity   Your activity upon discharge: activity as tolerated, home PT and OT     Monitor and record   weight every day. Please contact your cardiologist if your weight changes >2 lbs in one day or >5 lbs in one week.     When to contact your care team   Call your primary doctor if you have any of the following:  increased shortness of breath, increased swelling or weight change of > 2 lbs in one day or >5 lbs in one week.     Wound care and dressings   Instructions to care for your wound at home: Right foot wound: Daily cleanse with microklenz and pat dry.  Apply dab of iodosorb (order # [id number]) to wound bed and cover with small primipore.     Diet   Follow this diet upon discharge:      Moderate Consistent CHO Diet       Discharge Medications   Current Discharge Medication List      CONTINUE these medications which have CHANGED    Details   furosemide (LASIX) 20 MG tablet Take 2 tablets (40 mg) by mouth 2 times daily  Qty: 180 tablet, Refills: 3    Associated Diagnoses: Chronic systolic heart  failure (H)      warfarin (COUMADIN) 5 MG tablet Take 0.5 tablets tonight 9/24 and 1 tab tomorrow night 9/25. INR check on Wednesday 9/26 and continue as directed by coumadin clinic.  Qty: 45 tablet, Refills: 1    Associated Diagnoses: Chronic atrial fibrillation (H); Long-term (current) use of anticoagulants         CONTINUE these medications which have NOT CHANGED    Details   acetaminophen (TYLENOL) 325 MG tablet Take 325 mg by mouth every 4 hours as needed for mild pain      atorvastatin (LIPITOR) 20 MG tablet TAKE 1 TABLET BY MOUTH EVERY DAY  Qty: 90 tablet, Refills: 3    Associated Diagnoses: Claudication of left lower extremity (H)      cholecalciferol 1000 UNITS TABS Take 1,000 Units by mouth daily  Qty: 30 tablet    Associated Diagnoses: Loop diuretic causing adverse effect in therapeutic use, subsequent encounter      Citalopram Hydrobromide (CELEXA PO) Take 10 mg by mouth daily      clopidogrel (PLAVIX) 75 MG tablet Take 1 tablet (75 mg) by mouth daily  Qty: 90 tablet, Refills: 3    Associated Diagnoses: Status post percutaneous transluminal coronary angioplasty; Coronary artery disease involving native coronary artery of native heart without angina pectoris      digoxin (LANOXIN) 125 MCG tablet Take 1 tablet (125 mcg) by mouth daily  Qty: 90 tablet, Refills: 3    Associated Diagnoses: Chronic systolic congestive heart failure (H)      MELATONIN PO Take 5 mg by mouth daily      metFORMIN (GLUCOPHAGE) 500 MG tablet Take 1 tablet (500 mg) by mouth 2 times daily (with meals)  Qty: 60 tablet    Associated Diagnoses: Type 2 diabetes mellitus with diabetic polyneuropathy, without long-term current use of insulin (H)      metoprolol succinate (TOPROL-XL) 200 MG 24 hr tablet Take 0.5 tablets (100 mg) by mouth daily  Qty: 90 tablet, Refills: 3    Associated Diagnoses: Chronic systolic heart failure (H)      nitroglycerin (NITROSTAT) 0.4 MG sublingual tablet For chest pain place 1 tablet under the tongue every 5  minutes for 3 doses. If symptoms persist 5 minutes after 1st dose call 911.  Qty: 25 tablet    Associated Diagnoses: Atrial flutter, unspecified type (H)      nystatin (MYCOSTATIN) cream Apply topically 2 times daily      order for DME Equipment being ordered: DH2 shoe  Qty: 1 Device, Refills: 0    Associated Diagnoses: 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); Diabetic ulcer of left foot due to type 2 diabetes mellitus (H)      ranitidine (ZANTAC) 150 MG tablet Take 1 tablet (150 mg) by mouth 2 times daily  Qty: 60 tablet    Associated Diagnoses: Gastroesophageal reflux disease without esophagitis      sodium chloride (EQ SALINE NASAL SPRAY) 0.65 % nasal spray Spray 1 spray into both nostrils daily as needed for congestion  Qty: 1 Bottle, Refills: 1         STOP taking these medications       ALPRAZolam (XANAX) 0.25 MG tablet Comments:   Reason for Stopping:         lisinopril (PRINIVIL/ZESTRIL) 10 MG tablet Comments:   Reason for Stopping:         spironolactone (ALDACTONE) 25 MG tablet Comments:   Reason for Stopping:         tamsulosin (FLOMAX) 0.4 MG capsule Comments:   Reason for Stopping:             Allergies   Allergies   Allergen Reactions     Blood Transfusion Related (Informational Only) Other (See Comments)     Patient has a history of a clinically significant antibody against RBC antigens.  A delay in compatible RBCs may occur.

## 2018-09-24 NOTE — PROGRESS NOTES
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  Apply dab of iodosorb (order # [id number]) to wound bed and cover with small primipore.       Orders Written  Recommended provider order: Mahsa antifungal to buttocks twice daily  WO Nurse follow-up plan:weekly  Nursing to notify the Provider(s) and re-consult the WO Nurse if wound(s) deteriorates or new skin concern.    Patient History  According to provider note(s): Tad Manning is a 77 year old male admitted on 9/21/2018. He has a h/o DM2, CAD, AFib (on warfarin), and CLL. He is admitted due to elevated BUN and Cr in setting of generalized weakness    Objective Data  Containment of urine/stool: Incontinence Protocol    Active Diet Order    Active Diet Order      Moderate Consistent CHO Diet    Output:   I/O last 3 completed shifts:  In: 600 [P.O.:600]  Out: -     Risk Assessment:   Sensory Perception: 3-->slightly limited  Moisture: 4-->rarely moist  Activity: 3-->walks occasionally  Mobility: 3-->slightly limited  Nutrition: 3-->adequate  Friction and Shear: 3-->no apparent problem  Roberto Score: 19                          Labs:   Recent Labs  Lab 09/24/18  0452 09/23/18  0743 09/22/18  0710 09/22/18  0042   ALBUMIN  --   --   --  4.1   HGB  --  10.2*  --  10.4*   INR 3.08* 3.46* 4.95* 4.00*   WBC  --  6.8  --  7.9   A1C  --   --  9.0*  --        Physical Exam  Skin inspection: focused buttocks and right foot    Wound Location:  Right foot  Date of last photo did not save  Wound History: Pt states he had a callous that was removed by MD.  Has been dressing with dry gauze only.  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  Measurements (length x width x depth, in cm) 0.7 x 0.7 x 0.3 cm    Wound Base:  100%  dermis, no granulation tissue  Palpation of the wound bed: normal   Periwound skin: intact and hyperkeratosis  Color: normal and consistent with surrounding tissue  Temperature: normal   Drainage:, small  Description of drainage: serosanguinous  Odor: none  Pain: absent     Buttocks: erythema with peeling skin and light satellite lesions.  Scar on buttocks consistent with healed friction or pressure injury.  Pt denies burning or itching, however appearance is consistent with fungal infection.      Interventions  Current support surface: Standard  Atmos Air mattress  Current off-loading measures: Pillows under calves  Visual inspection of wound(s) completed  Wound Care: completed by RN  Supplies: ordered: iodosorb  Education provided: importance of repositioning and plan of care  Discussed plan of care with Patient and Nurse    Keyonna Hagan RN

## 2018-09-24 NOTE — PLAN OF CARE
Problem: Patient Care Overview  Goal: Plan of Care/Patient Progress Review  Outcome: Improving  Pt A&O x 4.  AVSS on RA.  No c/o pain or discomfort.  Up to BR with SBA and walker, ambulated in the halls. BG elevated, tx'd with sliding scale insulin.  Good PO intake.  Uses bathroom frequently.  WOC RN in, new orders for wound care on foot.  Plan to discharge home this afternoon.  Waiting for discharge orders.

## 2018-09-24 NOTE — PLAN OF CARE
Problem: Patient Care Overview  Goal: Plan of Care/Patient Progress Review  Outcome: Improving  History of falls, using call light appropriately. Up to bathroom with SBA and wheeled walker. Cardiac rhythm is a-fib with occasional PVCs. Bed mobility is independent. May discharge tomorrow, medication change and watching for now. Continue with current plan of nursing care, and update MD with concerns as needed.

## 2018-09-24 NOTE — PLAN OF CARE
DIscussed discharge instructions and f/u plan with pt.  Questions answered.  No new prescriptions, PIV removed.  Brought to front door for family transport via wheelchair.  Stable at discharge.

## 2018-09-24 NOTE — PLAN OF CARE
Problem: Patient Care Overview  Goal: Plan of Care/Patient Progress Review  Outcome: Improving  D/I  Uneventful shift, Patient remains afebrile, VSS. Pt is alert & oriented x 4. Tolerated popsickle and water.   No complaints of pain/ nausea.   Up to bathroom with SBA and 4 wheeled walker. , covered with 3 units of Novolog Insulin.  Continue with POC, Notify MD of any acute changes.

## 2018-09-25 ENCOUNTER — ANTICOAGULATION THERAPY VISIT (OUTPATIENT)
Dept: ANTICOAGULATION | Facility: CLINIC | Age: 78
End: 2018-09-25

## 2018-09-25 ENCOUNTER — PATIENT OUTREACH (OUTPATIENT)
Dept: CARE COORDINATION | Facility: CLINIC | Age: 78
End: 2018-09-25

## 2018-09-25 DIAGNOSIS — Z79.01 LONG-TERM (CURRENT) USE OF ANTICOAGULANTS: ICD-10-CM

## 2018-09-25 DIAGNOSIS — I48.91 ATRIAL FIBRILLATION, UNSPECIFIED TYPE (H): ICD-10-CM

## 2018-09-25 NOTE — PROGRESS NOTES
Dates of hospitalization: 9/22 to 9/24  Reason for hospitalization: Decreased kidney function due to dehydration and over-diuresing with cardiac meds.  Procedures performed: IVF and med changes  Vitamin K or FFP administered? no  Inpatient warfarin doses added to calendar? yes  Medication changes at discharge: Dose change with Lasix, and Discontinued Xanax, Lisinopril, aldactone and flomax.  Warfarin dosing after DC: 9/24=2.5mg, 9/25=5mg  Patient discharged on Lovenox? no  Next INR date: 9/26  Where is the patient discharging to? (home, TCU, staying locally, etc.): home  Will patient have home care? Yes. [facility]

## 2018-09-27 ENCOUNTER — OFFICE VISIT (OUTPATIENT)
Dept: CARDIOLOGY | Facility: CLINIC | Age: 78
End: 2018-09-27
Attending: NURSE PRACTITIONER
Payer: MEDICARE

## 2018-09-27 ENCOUNTER — ANTICOAGULATION THERAPY VISIT (OUTPATIENT)
Dept: ANTICOAGULATION | Facility: CLINIC | Age: 78
End: 2018-09-27

## 2018-09-27 VITALS
HEIGHT: 72 IN | WEIGHT: 173 LBS | HEART RATE: 86 BPM | BODY MASS INDEX: 23.43 KG/M2 | DIASTOLIC BLOOD PRESSURE: 66 MMHG | OXYGEN SATURATION: 99 % | SYSTOLIC BLOOD PRESSURE: 104 MMHG

## 2018-09-27 DIAGNOSIS — Z79.01 LONG-TERM (CURRENT) USE OF ANTICOAGULANTS: ICD-10-CM

## 2018-09-27 DIAGNOSIS — I48.91 ATRIAL FIBRILLATION, UNSPECIFIED TYPE (H): ICD-10-CM

## 2018-09-27 DIAGNOSIS — I48.20 CHRONIC ATRIAL FIBRILLATION (H): ICD-10-CM

## 2018-09-27 DIAGNOSIS — I50.22 CHRONIC SYSTOLIC HEART FAILURE (H): ICD-10-CM

## 2018-09-27 LAB
ANION GAP SERPL CALCULATED.3IONS-SCNC: 12 MMOL/L (ref 3–14)
BUN SERPL-MCNC: 47 MG/DL (ref 7–30)
CALCIUM SERPL-MCNC: 9.4 MG/DL (ref 8.5–10.1)
CHLORIDE SERPL-SCNC: 95 MMOL/L (ref 94–109)
CO2 SERPL-SCNC: 22 MMOL/L (ref 20–32)
CREAT SERPL-MCNC: 1.76 MG/DL (ref 0.66–1.25)
GFR SERPL CREATININE-BSD FRML MDRD: 38 ML/MIN/1.7M2
GLUCOSE SERPL-MCNC: 296 MG/DL (ref 70–99)
INR PPP: 1.3
INR PPP: 1.4 (ref 0.86–1.14)
POTASSIUM SERPL-SCNC: 5 MMOL/L (ref 3.4–5.3)
SODIUM SERPL-SCNC: 129 MMOL/L (ref 133–144)

## 2018-09-27 PROCEDURE — 99214 OFFICE O/P EST MOD 30 MIN: CPT | Mod: ZP | Performed by: NURSE PRACTITIONER

## 2018-09-27 PROCEDURE — G0463 HOSPITAL OUTPT CLINIC VISIT: HCPCS | Mod: ZF

## 2018-09-27 PROCEDURE — 36415 COLL VENOUS BLD VENIPUNCTURE: CPT | Performed by: NURSE PRACTITIONER

## 2018-09-27 PROCEDURE — 85610 PROTHROMBIN TIME: CPT | Performed by: NURSE PRACTITIONER

## 2018-09-27 PROCEDURE — 80048 BASIC METABOLIC PNL TOTAL CA: CPT | Performed by: NURSE PRACTITIONER

## 2018-09-27 RX ORDER — FUROSEMIDE 20 MG
40 TABLET ORAL DAILY
Qty: 180 TABLET | Refills: 3 | Status: SHIPPED | OUTPATIENT
Start: 2018-09-27 | End: 2018-10-16

## 2018-09-27 ASSESSMENT — PAIN SCALES - GENERAL: PAINLEVEL: NO PAIN (0)

## 2018-09-27 NOTE — PATIENT INSTRUCTIONS
"You were seen today in the Cardiovascular Clinic at the [facility].     Cardiology Providers you saw during your visit: Jess Chi NP      1. Please take no more furosemide (Lasix) today. Do not take any tomorrow. On Saturday (), restart your furosemide at 40 mg once daily    2. Please stop taking digoxin. We may restart it later after things settle down.    3. Please have blood drawn on Tuesday with your INR    4. Please call with any concerns at all. We'll plan for you to return to see Dr. Valeds, as scheduled, and see you sooner if anything comes up.      Please limit your fluid intake to 2 L (64 ounces) daily.  2 Liters a day = 8.5 cups, or 72 ounces.  Please limit your salt intake to 2 grams a day or less.    If you gain 2# in 24 hours or 5# in one week call Nicole Hollingsworth RN so we can adjust your medications as needed over the phone.    Please feel free to call me with any questions or concerns.      Nicole Hollingsworth RN BSN CHFN  [facility]  Cardiology Care Coordinator-Heart Failure Clinic    Questions and [phone].   First press #1 for the University and then press #3 for \"Medical Questions\" to reach us Cardiology Nurses.     On Call Cardiologist for after hours or on weekends: [phone]   option #4 and ask to speak to the on-call Cardiologist. Inform them you are a CORE/heart failure patient at the [place].        If you need a medication refill please contact your pharmacy.  Please allow 3 business days for your refill to be completed.  _______________________________________________________  C.O.R.E. CLINIC Cardiomyopathy, Optimization, Rehabilitation, Education   The C.O.R.E. CLINIC is a heart failure specialty clinic within the [facility] where you will work with specialized nurse practitioners dedicated to helping patients with heart failure carefully adjust medications, receive education, and learn " who and when to call if symptoms develop. They specialize in helping you better understand your condition, slow the progression of your disease, improve the length and quality of your life, help you detect future heart problems before they become life threatening, and avoid hospitalizations.  As always, thank you for trusting us with your health care needs!

## 2018-09-27 NOTE — NURSING NOTE
Vitals completed successfully and medication reconciled. Pinky Gallardo MA  Chief Complaint   Patient presents with     Follow Up For     Return CORE; 77yr old male wih a h/o CAD, Aflutter and chronic systolic HF secondary to ICM presenting for HF follow-up with

## 2018-09-27 NOTE — MR AVS SNAPSHOT
"              After Visit Summary   2018    Tad Manning    MRN: [id number]           Patient Information     Date Of Birth          1940        Visit Information        Provider Department      2018 1:00 PM Jess Chi NP [facility]        Today's Diagnoses     Chronic systolic heart failure (H)          Care Instructions    You were seen today in the Cardiovascular Clinic at the [facility].     Cardiology Providers you saw during your visit: Jess Chi NP      1. Please take no more furosemide (Lasix) today. Do not take any tomorrow. On Saturday (), restart your furosemide at 40 mg once daily    2. Please stop taking digoxin. We may restart it later after things settle down.    3. Please have blood drawn on Tuesday with your INR    4. Please call with any concerns at all. We'll plan for you to return to see Dr. Valdes, as scheduled, and see you sooner if anything comes up.      Please limit your fluid intake to 2 L (64 ounces) daily.  2 Liters a day = 8.5 cups, or 72 ounces.  Please limit your salt intake to 2 grams a day or less.    If you gain 2# in 24 hours or 5# in one week call Nicole Hollingsworth RN so we can adjust your medications as needed over the phone.    Please feel free to call me with any questions or concerns.      Nicole Hollingsworth RN BSN CHFN  [facility]  Cardiology Care Coordinator-Heart Failure Clinic    Questions and [phone].   First press #1 for the [employer] and then press #3 for \"Medical Questions\" to reach us Cardiology Nurses.     On Call Cardiologist for after hours or on weekends: [phone]   option #4 and ask to speak to the on-call Cardiologist. Inform them you are a CORE/heart failure patient at the [place].        If you need a medication refill please contact your pharmacy.  Please allow 3 business days for your refill to be " completed.  _______________________________________________________  C.O.R.E. CLINIC Cardiomyopathy, Optimization, Rehabilitation, Education   The C.O.R.E. CLINIC is a heart failure specialty clinic within the [facility] where you will work with specialized nurse practitioners dedicated to helping patients with heart failure carefully adjust medications, receive education, and learn who and when to call if symptoms develop. They specialize in helping you better understand your condition, slow the progression of your disease, improve the length and quality of your life, help you detect future heart problems before they become life threatening, and avoid hospitalizations.  As always, thank you for trusting us with your health care needs!                Follow-ups after your visit        Your next 10 appointments already scheduled     Oct 02, 2018  9:50 AM CDT   LAB with LAB FIRST FLOOR [facility] ([facility])    [address] [place] [address] [phone] [phone]           Please do not eat 10-12 hours before your appointment if you are coming in fasting for labs on lipids, cholesterol, or glucose (sugar). This does not apply to pregnant women. Water, hot tea and black coffee (with nothing added) are okay. Do not drink other fluids, diet soda or chew gum.            Oct 03, 2018 10:15 AM CDT   US WALLACE DOPPLER WITH EXERCISE BILATERAL with SHVUS1   [facility] ([facility])    [address]  [place] [address]   [phone]           How do I prepare for my exam? (Food and drink instructions) No caffeine or tobacco for 1 hour prior to exam.  What should I wear: Wear comfortable clothes.  How long does the exam take: Most ultrasounds take 30 to 60 minutes.  What should I bring: Bring a list of your medicines, including vitamins, minerals and over-the-counter  drugs. It is safest to leave personal items at home.  Do I need a :  No  is needed.  What do I need to tell my doctor: Tell your doctor about any allergies you may have.  What should I do after the exam: No restrictions, You may resume normal activities.  What is this test: An ultrasound uses sound waves to make pictures of the body. Sound waves do not cause pain. The only discomfort may be the pressure of the wand against your skin or full bladder.  Who should I call with questions: If you have any questions, please call the Imaging Department where you will have your exam. Directions, parking instructions, and other information is available on our website, [email or web address].            Oct 03, 2018 10:45 AM CDT   Return Visit with Amador Vick MD   [facility] ([facility])    [address] [place] [address] [address]  [place] [id number]   [phone]            Oct 22, 2018  1:00 PM CDT   (Arrive by 12:45 PM)   Return Visit with Clarisse Valdes MD   [facility] ([facility])    [address] [place] [address]  [address]  [facility] [address] [phone]              Future tests that were ordered for you today     Open Future Orders        Priority Expected Expires Ordered    Basic metabolic panel Routine 10/2/2018 10/2/2018 9/27/2018            Who to contact     If you have questions or need follow up information about today's clinic visit or your schedule please contact [facility] directly at [phone].  Normal or non-critical lab and imaging results will be communicated to you by MyChart, letter or phone within 4 business days after the clinic has received the results. If you do not hear from us within 7 days, please contact the clinic through MyChart or phone. If you have a critical or abnormal lab result, we will notify you by phone as soon as possible.  Submit refill requests through  [employer] or call your pharmacy and they will forward the refill request to us. Please allow 3 business days for your refill to be completed.          Additional Information About Your Visit        conXthart Information     [employer] gives you secure access to your electronic health record. If you see a primary care provider, you can also send messages to your care team and make appointments. If you have questions, please call your primary care clinic.  If you do not have a primary care provider, please call [phone] and they will assist you.        Care EveryWhere ID     This is your Care EveryWhere ID. This could be used by other organizations to access your [place] medical records  [id number]        Your Vitals Were     Pulse Height Pulse Oximetry BMI (Body Mass Index)          86 1.829 m (6') 99% 23.46 kg/m2         Blood Pressure from Last 3 Encounters:   09/27/18 104/66   09/24/18 127/52   07/16/18 105/69    Weight from Last 3 Encounters:   09/27/18 78.5 kg (173 lb)   09/23/18 82 kg (180 lb 12.8 oz)   07/16/18 83.3 kg (183 lb 11.2 oz)                 Today's Medication Changes          These changes are accurate as of 9/27/18  1:52 PM.  If you have any questions, ask your nurse or doctor.               These medicines have changed or have updated prescriptions.        Dose/Directions    furosemide 20 MG tablet   Commonly known as:  LASIX   This may have changed:  when to take this   Used for:  Chronic systolic heart failure (H)   Changed by:  Jess Chi NP        Dose:  40 mg   Take 2 tablets (40 mg) by mouth daily   Quantity:  180 tablet   Refills:  3         Stop taking these medicines if you haven't already. Please contact your care team if you have questions.     digoxin 125 MCG tablet   Commonly known as:  LANOXIN   Stopped by:  Jess Chi NP                Where to get your medicines      These medications were sent to [facility] [address] [place] [address]  AT [facility] [address]  [address], [facility] [address]     Phone:  [phone]     furosemide 20 MG tablet                Primary Care Provider Office Phone # Fax #    Gene Guevara [phone]       [facility] [address] [place] [id number]        Equal Access to Services     STUART ELLISON : Hadii aad ku hadasho Soomaali, waaxda luqadaha, qaybta kaalmada adeegyada, waxay idiin hayaan adeeg kharash la'aan ah. So [facility] [phone].    ATENCIÓN: Si habla español, tiene a noe disposición servicios gratuitos de asistencia lingüística. [facility] [phone].    We comply with applicable federal civil rights laws and [place] laws. We do not discriminate on the basis of race, color, national origin, age, disability, sex, sexual orientation, or gender identity.            Thank you!     Thank you for choosing [facility]  for your care. Our goal is always to provide you with excellent care. Hearing back from our patients is one way we can continue to improve our services. Please take a few minutes to complete the written survey that you may receive in the mail after your visit with us. Thank you!             Your Updated Medication List - Protect others around you: Learn how to safely use, store and throw away your medicines at www.disposemymeds.org.          This list is accurate as of 9/27/18  1:52 PM.  Always use your most recent med list.                   Brand Name Dispense Instructions for use Diagnosis    acetaminophen 325 MG tablet    TYLENOL     Take 325 mg by mouth every 4 hours as needed for mild pain        atorvastatin 20 MG tablet    LIPITOR    90 tablet    TAKE 1 TABLET BY MOUTH EVERY DAY    Claudication of left lower extremity (H)       CELEXA PO      Take 10 mg by mouth daily        cholecalciferol 1000 units Tabs     30 tablet    Take 1,000 Units by mouth daily    Loop diuretic causing adverse effect in therapeutic use, subsequent  encounter       clopidogrel 75 MG tablet    PLAVIX    90 tablet    Take 1 tablet (75 mg) by mouth daily    Status post percutaneous transluminal coronary angioplasty, Coronary artery disease involving native coronary artery of native heart without angina pectoris       furosemide 20 MG tablet    LASIX    180 tablet    Take 2 tablets (40 mg) by mouth daily    Chronic systolic heart failure (H)       MELATONIN PO      Take 5 mg by mouth daily        metFORMIN 500 MG tablet    GLUCOPHAGE    60 tablet    Take 1 tablet (500 mg) by mouth 2 times daily (with meals)    Type 2 diabetes mellitus with diabetic polyneuropathy, without long-term current use of insulin (H)       metoprolol succinate 200 MG 24 hr tablet    TOPROL-XL    90 tablet    Take 0.5 tablets (100 mg) by mouth daily    Chronic systolic heart failure (H)       nitroGLYcerin 0.4 MG sublingual tablet    NITROSTAT    25 tablet    For chest pain place 1 tablet under the tongue every 5 minutes for 3 doses. If symptoms persist 5 minutes after 1st dose call 911.    Atrial flutter, unspecified type (H)       nystatin cream    MYCOSTATIN     Apply topically 2 times daily        order for DME     1 Device    Equipment being ordered: [facility] shoe    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), Diabetic ulcer of left foot due to type 2 diabetes mellitus (H)       ranitidine 150 MG tablet    ZANTAC    60 tablet    Take 1 tablet (150 mg) by mouth 2 times daily    Gastroesophageal reflux disease without esophagitis       sodium chloride 0.65 % nasal spray    EQ SALINE NASAL SPRAY    1 Bottle    Spray 1 spray into both nostrils daily as needed for congestion        warfarin 5 MG tablet    COUMADIN    45 tablet    Take 0.5 tablets tonight 9/24 and 1 tab tomorrow night 9/25. INR check on Wednesday 9/26 and continue as directed by coumadin clinic.    Chronic atrial fibrillation (H), Long-term (current) use of anticoagulants

## 2018-09-27 NOTE — PROGRESS NOTES
HPI  Mr. Manning is a 77 year old man with a history of CAD, ICM, HTN, HLD, atrial flutter, s/p popliteal bypass and paraspinal abscess s/p surgical decompression with IV antibiotics. He was last seen in cardiology by Dr. Valdes 2 months ago when digoxin was added. He had a fall several weeks ago prompting a visit to his PCP where his labwork showed prerenal STORM prompting a visit to the ED. He was admitted and received 750 ml of fluid and discharged with his lisinopril and spironolactone on hold. His diuretics were reduced. He returns for follow up.    Mr. Manning reports feeling well. He's had no falls since discharge and notes less lightheadedness. His leg weakness continues. He denies shortness of breath though struggles to differentiate his limitations. He has been sleeping in recliner (previoulsy felt 'anxious' if lying flat). No PND. No edema. Appetite variable. No nausea    PMH  Past Medical History:   Diagnosis Date     Arthritis      ASCVD (arteriosclerotic cardiovascular disease)      BMI 30.0-30.9,adult      BPH (benign prostatic hypertrophy)      Cellulitis      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)      Coronary artery disease     triple bypass 1995     Diabetes mellitus (H)      Diabetic polyneuropathy (H)      History of blood transfusion      Hyperlipidaemia      Hypertension      Noninflammatory pericardial effusion      Osteomyelitis of left foot (H)      PVD (peripheral vascular disease) (H)      Sebaceous cyst      Social History     Social History     Marital status: Single     Spouse name: N/A     Number of children: N/A     Years of education: N/A     Occupational History     Not on file.     Social History Main Topics     Smoking status: Former Smoker     Packs/day: 2.00     Years: 30.00     Types: Cigarettes     Quit date: 6/19/1995     Smokeless tobacco: Never Used     Alcohol use No     Drug use: No     Sexual activity: Not on file     Other Topics  Concern     Not on file     Social History Narrative    Lives independently with SO. 2/9/2017      Family History   Problem Relation Age of Onset     Cancer Mother      leukemia     MEDS   Current Outpatient Prescriptions on File Prior to Visit:  acetaminophen (TYLENOL) 325 MG tablet Take 325 mg by mouth every 4 hours as needed for mild pain   atorvastatin (LIPITOR) 20 MG tablet TAKE 1 TABLET BY MOUTH EVERY DAY   cholecalciferol 1000 UNITS TABS Take 1,000 Units by mouth daily   Citalopram Hydrobromide (CELEXA PO) Take 10 mg by mouth daily   clopidogrel (PLAVIX) 75 MG tablet Take 1 tablet (75 mg) by mouth daily   digoxin (LANOXIN) 125 MCG tablet Take 1 tablet (125 mcg) by mouth daily   furosemide (LASIX) 20 MG tablet Take 2 tablets (40 mg) by mouth 2 times daily   MELATONIN PO Take 5 mg by mouth daily   metFORMIN (GLUCOPHAGE) 500 MG tablet Take 1 tablet (500 mg) by mouth 2 times daily (with meals)   metoprolol succinate (TOPROL-XL) 200 MG 24 hr tablet Take 0.5 tablets (100 mg) by mouth daily   nitroglycerin (NITROSTAT) 0.4 MG sublingual tablet For chest pain place 1 tablet under the tongue every 5 minutes for 3 doses. If symptoms persist 5 minutes after 1st dose call 911.   nystatin (MYCOSTATIN) cream Apply topically 2 times daily   order for DME Equipment being ordered: [employer] shoe   ranitidine (ZANTAC) 150 MG tablet Take 1 tablet (150 mg) by mouth 2 times daily   sodium chloride (EQ SALINE NASAL SPRAY) 0.65 % nasal spray Spray 1 spray into both nostrils daily as needed for congestion   warfarin (COUMADIN) 5 MG tablet Take 0.5 tablets tonight 9/24 and 1 tab tomorrow night 9/25. INR check on Wednesday 9/26 and continue as directed by coumadin clinic.     No current facility-administered medications on file prior to visit.     Physical examination:  /66 (BP Location: Left arm, Patient Position: Chair, Cuff Size: Adult Regular)  Pulse 86  Ht 1.829 m (6')  Wt 78.5 kg (173 lb)  SpO2 99%  BMI 23.46 kg/m2  GENERAL  APPEARANCE: healthy, alert and no distress  HEENT: no icterus, no xanthelasmas, normal pupil size and reaction, normal palate, mucosa moist, no cyanosis.  NECK: no adenopathy, no asymmetry, masses, or scars  CHEST: lungs clear to auscultation - no rales, rhonchi or wheezes, no use of accessory muscles, no retractions, respirations are unlabored, normal respiratory rate, no kyphosis, no scoliosis  CARDIOVASCULAR: regular rhythm, normal S1 with physiologic split S2, no S3 or S4 and no murmur, click or rub, precordium quiet with normal PMI. No JVD  ABDOMEN: soft, non tender, without hepatomegaly, no masses palpable, bowel sounds normal  EXTREMITIES: no clubbing, cyanosis or edema  NEURO: alert and oriented to person/place/time, normal speech, gait and affect  SKIN: no ecchymoses, no rashes    LABS  Last Comprehensive Metabolic Panel:  Sodium   Date Value Ref Range Status   09/27/2018 129 (L) 133 - 144 mmol/L Final     Potassium   Date Value Ref Range Status   09/27/2018 5.0 3.4 - 5.3 mmol/L Final     Chloride   Date Value Ref Range Status   09/27/2018 95 94 - 109 mmol/L Final     Carbon Dioxide   Date Value Ref Range Status   09/27/2018 22 20 - 32 mmol/L Final     Anion Gap   Date Value Ref Range Status   09/27/2018 12 3 - 14 mmol/L Final     Glucose   Date Value Ref Range Status   09/27/2018 296 (H) 70 - 99 mg/dL Final     Urea Nitrogen   Date Value Ref Range Status   09/27/2018 47 (H) 7 - 30 mg/dL Final     Creatinine   Date Value Ref Range Status   09/27/2018 1.76 (H) 0.66 - 1.25 mg/dL Final     GFR Estimate   Date Value Ref Range Status   09/27/2018 38 (L) >60 mL/min/1.7m2 Final     Comment:     Non  GFR Calc     Calcium   Date Value Ref Range Status   09/27/2018 9.4 8.5 - 10.1 mg/dL Final         ECHO  Recent Results (from the past 4320 hour(s))   Echo limited with definity    Narrative    [id number]  [id number]  [id number]  [id number]^LUIGI^YANELY^           [facility]  Echocardiography Laboratory  [address] [place] [id number]     Name: PETER MANNING  MRN: [id number]  : 1940  Study Date: 2017 12:24 PM  Age: 76 yrs  Gender: Male  Patient Location: [facility]  Reason For Study: Arrhythmia  Ordering Physician: YANELY MEYERS  Performed By: DO [place]     BSA: 2.1 m2  Height: 72 in  Weight: 190 lb  BP: 128/64 mmHg  _____________________________________________________________________________  __        Procedure  Complete Portable Echo Adult. Contrast Optison. Technically difficult study.  Optison (NDC #6545-9173-58) given intravenously. Patient was given 6 ml  mixture of 3 ml Optison and 6 ml saline. 3 ml wasted. IV start location R  Upper arm . The patient's rhythm is atrial fibrillation.  _____________________________________________________________________________  __        Interpretation Summary  Technically difficult study.The patient's rhythm is atrial fibrillation.  The left ventricle is severely dilated. Left ventricular systolic function is  severely reduced, ejection fraction is estimated at 25-30%. There is severe  global hypokinesis.  Right ventricular systolic function is mildly reduced.  The right ventricular systolic pressure is approximated at 15 mmHg plus the  right atrial pressure. IVC is plethoric and estimated mean RA pressure is 15  mmHg.  No pericardial effusion present.  _____________________________________________________________________________  __     _____________________________________________________________________________  __     MMode/2D Measurements & Calculations     EF(MOD-bp): 30.0 %        Doppler Measurements & Calculations  TR max alessia: 190.0 cm/sec  TR max P.4 mmHg           _____________________________________________________________________________  __           Report approved by: Edward Snyder 2017 05:09 PM        Assessment and Plan  Mr. Manning is a pleasant 76 year  old man with a history of CAD, ICM, HTN, HLD, atrial flutter, s/p popliteal bypass who appears hypovolemic, hyponatremic (though also skewed by his glucose today), with a bump in his creatinine. He will hold his Lasix today and tomorrow then resume at 40 mg once daily. Given his variable renal function recently and recent upward trend in digoxin, we'll discontinue it today. He will repeat labs early next week with his next INR and return to see Dr. Valdes in 2 weeks. He'll return to CORE sooner if his labs are still in disarray.     1. Chronic systolic heart failure secondary to ICM  NYHA II-IIIA (confounded my immobility)  Stage C  ACE/ARB -yes - lisinopril  BB - yes - Toprol  Ald Ant - yes - Spironolactone 12.5 mg  SCD prevention -- deferred given recent osteomyelitis and pending complete evaluation of CAD  Apnea--was not discussed today, risk factors include gender, age, and history of CAD  Volume    2. CAD. On a statin, beta blocker, ASA, and Plavix. He has deferred further intervention until feeling stronger and cleared of osteomyelitis    3. Atrial Flutter. He has not been anticoagulated due to fall risk. He is on ASA and Plavix plus beta blocker. Digoxin stopped today    25 minutes spent in direct care, >50% in counseling

## 2018-09-27 NOTE — LETTER
9/27/2018      RE: Tad Manning  [address] [address] [address]       Dear Colleague,    Thank you for the opportunity to participate in the care of your patient, Tad Manning, at the [facility] at [facility]. Please see a copy of my visit note below.    HPI  Mr. Manning is a 77 year old man with a history of CAD, ICM, HTN, HLD, atrial flutter, s/p popliteal bypass and paraspinal abscess s/p surgical decompression with IV antibiotics. He was last seen in cardiology by Dr. Valdes 2 months ago when digoxin was added. He had a fall several weeks ago prompting a visit to his PCP where his labwork showed prerenal STORM prompting a visit to the ED. He was admitted and received 750 ml of fluid and discharged with his lisinopril and spironolactone on hold. His diuretics were reduced. He returns for follow up.    Mr. Manning reports feeling well. He's had no falls since discharge and notes less lightheadedness. His leg weakness continues. He denies shortness of breath though struggles to differentiate his limitations. He has been sleeping in recliner (previoulsy felt 'anxious' if lying flat). No PND. No edema. Appetite variable. No nausea    PMH  Past Medical History:   Diagnosis Date     Arthritis      ASCVD (arteriosclerotic cardiovascular disease)      BMI 30.0-30.9,adult      BPH (benign prostatic hypertrophy)      Cellulitis      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)      Coronary artery disease     triple bypass 1995     Diabetes mellitus (H)      Diabetic polyneuropathy (H)      History of blood transfusion      Hyperlipidaemia      Hypertension      Noninflammatory pericardial effusion      Osteomyelitis of left foot (H)      PVD (peripheral vascular disease) (H)      Sebaceous cyst      Social History     Social History     Marital status: Single     Spouse name: N/A     Number of children: N/A      Years of education: N/A     Occupational History     Not on file.     Social History Main Topics     Smoking status: Former Smoker     Packs/day: 2.00     Years: 30.00     Types: Cigarettes     Quit date: 6/19/1995     Smokeless tobacco: Never Used     Alcohol use No     Drug use: No     Sexual activity: Not on file     Other Topics Concern     Not on file     Social History Narrative    Lives independently with SO. 2/9/2017      Family History   Problem Relation Age of Onset     Cancer Mother      leukemia     MEDS   Current Outpatient Prescriptions on File Prior to Visit:  acetaminophen (TYLENOL) 325 MG tablet Take 325 mg by mouth every 4 hours as needed for mild pain   atorvastatin (LIPITOR) 20 MG tablet TAKE 1 TABLET BY MOUTH EVERY DAY   cholecalciferol 1000 UNITS TABS Take 1,000 Units by mouth daily   Citalopram Hydrobromide (CELEXA PO) Take 10 mg by mouth daily   clopidogrel (PLAVIX) 75 MG tablet Take 1 tablet (75 mg) by mouth daily   digoxin (LANOXIN) 125 MCG tablet Take 1 tablet (125 mcg) by mouth daily   furosemide (LASIX) 20 MG tablet Take 2 tablets (40 mg) by mouth 2 times daily   MELATONIN PO Take 5 mg by mouth daily   metFORMIN (GLUCOPHAGE) 500 MG tablet Take 1 tablet (500 mg) by mouth 2 times daily (with meals)   metoprolol succinate (TOPROL-XL) 200 MG 24 hr tablet Take 0.5 tablets (100 mg) by mouth daily   nitroglycerin (NITROSTAT) 0.4 MG sublingual tablet For chest pain place 1 tablet under the tongue every 5 minutes for 3 doses. If symptoms persist 5 minutes after 1st dose call 911.   nystatin (MYCOSTATIN) cream Apply topically 2 times daily   order for DME Equipment being ordered: 2 shoe   ranitidine (ZANTAC) 150 MG tablet Take 1 tablet (150 mg) by mouth 2 times daily   sodium chloride (EQ SALINE NASAL SPRAY) 0.65 % nasal spray Spray 1 spray into both nostrils daily as needed for congestion   warfarin (COUMADIN) 5 MG tablet Take 0.5 tablets tonight 9/24 and 1 tab tomorrow night 9/25. INR  check on Wednesday 9/26 and continue as directed by coumadin clinic.     No current facility-administered medications on file prior to visit.     Physical examination:  /66 (BP Location: Left arm, Patient Position: Chair, Cuff Size: Adult Regular)  Pulse 86  Ht 1.829 m (6')  Wt 78.5 kg (173 lb)  SpO2 99%  BMI 23.46 kg/m2  GENERAL APPEARANCE: healthy, alert and no distress  HEENT: no icterus, no xanthelasmas, normal pupil size and reaction, normal palate, mucosa moist, no cyanosis.  NECK: no adenopathy, no asymmetry, masses, or scars  CHEST: lungs clear to auscultation - no rales, rhonchi or wheezes, no use of accessory muscles, no retractions, respirations are unlabored, normal respiratory rate, no kyphosis, no scoliosis  CARDIOVASCULAR: regular rhythm, normal S1 with physiologic split S2, no S3 or S4 and no murmur, click or rub, precordium quiet with normal PMI. No JVD  ABDOMEN: soft, non tender, without hepatomegaly, no masses palpable, bowel sounds normal  EXTREMITIES: no clubbing, cyanosis or edema  NEURO: alert and oriented to person/place/time, normal speech, gait and affect  SKIN: no ecchymoses, no rashes    LABS  Last Comprehensive Metabolic Panel:  Sodium   Date Value Ref Range Status   09/27/2018 129 (L) 133 - 144 mmol/L Final     Potassium   Date Value Ref Range Status   09/27/2018 5.0 3.4 - 5.3 mmol/L Final     Chloride   Date Value Ref Range Status   09/27/2018 95 94 - 109 mmol/L Final     Carbon Dioxide   Date Value Ref Range Status   09/27/2018 22 20 - 32 mmol/L Final     Anion Gap   Date Value Ref Range Status   09/27/2018 12 3 - 14 mmol/L Final     Glucose   Date Value Ref Range Status   09/27/2018 296 (H) 70 - 99 mg/dL Final     Urea Nitrogen   Date Value Ref Range Status   09/27/2018 47 (H) 7 - 30 mg/dL Final     Creatinine   Date Value Ref Range Status   09/27/2018 1.76 (H) 0.66 - 1.25 mg/dL Final     GFR Estimate   Date Value Ref Range Status   09/27/2018 38 (L) >60 mL/min/1.7m2 Final      Comment:     Non  GFR Calc     Calcium   Date Value Ref Range Status   2018 9.4 8.5 - 10.1 mg/dL Final         ECHO  Recent Results (from the past 4320 hour(s))   Echo limited with definity    Narrative    [id number]  [id number]  [id number]  [id number]^LUIGI^YANELY^           [facility]  Echocardiography Laboratory  [address] [place] [address]     Name: PETER BUTLER  MRN: [id number]  : 1940  Study Date: 2017 12:24 PM  Age: 76 yrs  Gender: Male  Patient Location: [facility]  Reason For Study: Arrhythmia  Ordering Physician: YANELY MEYERS  Performed By: DO Austin     BSA: 2.1 m2  Height: 72 in  Weight: 190 lb  BP: 128/64 mmHg  _____________________________________________________________________________  __        Procedure  Complete Portable Echo Adult. Contrast Optison. Technically difficult study.  Optison (NDC #5531-4257-57) given intravenously. Patient was given 6 ml  mixture of 3 ml Optison and 6 ml saline. 3 ml wasted. IV start location R  Upper arm . The patient's rhythm is atrial fibrillation.  _____________________________________________________________________________  __        Interpretation Summary  Technically difficult study.The patient's rhythm is atrial fibrillation.  The left ventricle is severely dilated. Left ventricular systolic function is  severely reduced, ejection fraction is estimated at 25-30%. There is severe  global hypokinesis.  Right ventricular systolic function is mildly reduced.  The right ventricular systolic pressure is approximated at 15 mmHg plus the  right atrial pressure. IVC is plethoric and estimated mean RA pressure is 15  mmHg.  No pericardial effusion present.  _____________________________________________________________________________  __     _____________________________________________________________________________  __     MMode/2D Measurements & Calculations      EF(MOD-bp): 30.0 %        Doppler Measurements & Calculations  TR max alessia: 190.0 cm/sec  TR max P.4 mmHg           _____________________________________________________________________________  __           Report approved by: Edward Snyder 2017 05:09 PM        Assessment and Plan  Mr. Manning is a pleasant 76 year old man with a history of CAD, ICM, HTN, HLD, atrial flutter, s/p popliteal bypass who appears hypovolemic, hyponatremic (though also skewed by his glucose today), with a bump in his creatinine. He will hold his Lasix today and tomorrow then resume at 40 mg once daily. Given his variable renal function recently and recent upward trend in digoxin, we'll discontinue it today. He will repeat labs early next week with his next INR and return to see Dr. Valdes in 2 weeks. He'll return to [facility] sooner if his labs are still in disarray.     1. Chronic systolic heart failure secondary to ICM  NYHA II-IIIA (confounded my immobility)  Stage C  ACE/ARB -yes - lisinopril  BB - yes - Toprol  Ald Ant - yes - Spironolactone 12.5 mg  SCD prevention -- deferred given recent osteomyelitis and pending complete evaluation of CAD  Apnea--was not discussed today, risk factors include gender, age, and history of CAD  Volume    2. CAD. On a statin, beta blocker, ASA, and Plavix. He has deferred further intervention until feeling stronger and cleared of osteomyelitis    3. Atrial Flutter. He has not been anticoagulated due to fall risk. He is on ASA and Plavix plus beta blocker. Digoxin stopped today    25 minutes spent in direct care, >50% in counseling        Please do not hesitate to contact me if you have any questions/concerns.     Sincerely,     Jess Chi NP

## 2018-09-27 NOTE — PROGRESS NOTES
Patient was recently discharged to home with home care.  Updated Anticoagulation tracking flowsheet.  Missed dose on the 24th.  He took 2.5mg on the 25th, and 5mg last evening.  Suspect patient needs to build dose up to maintenance dose.  Recommended 10mg today, will check an INR at home visit tomorrow.  He is being seen daily by home care.  Will have an INR drawn with other labs at his appointment at the  today.  Please phone OH Perrin at [phone] with results.      Darien Ballard RN    ANTICOAGULATION FOLLOW-UP CLINIC VISIT    Patient Name:  Tad Manning  Date:  9/27/2018  Contact Type:  Telephone    SUBJECTIVE:     Patient Findings     Positives Hospital admission    Comments Patient was recently discharged to home with home care.  Updated Anticoagulation tracking flowsheet.  Missed dose on the 24th.  He took 2.5mg on the 25th, and 5mg last evening.  Suspect patient needs to build dose up to maintenance dose.  Recommended 10mg today, will check an INR at home visit tomorrow.  He is being seen daily by home care.  Will have an INR drawn with other labs at his appointment at the  today.  Please phone OH Perrin at [phone] with results.           OBJECTIVE    INR   Date Value Ref Range Status   09/27/2018 1.3  Final     Comment:     Home care INR        ASSESSMENT / PLAN  INR assessment SUB    Recheck INR In: 1 DAY    INR Location Homecare INR      Anticoagulation Summary as of 9/27/2018     INR goal 2.0-3.0   Today's INR 1.3!   Warfarin maintenance plan 7.5 mg (5 mg x 1.5) on Mon, Wed, Fri; 5 mg (5 mg x 1) all other days   Full warfarin instructions 9/27: 10 mg; Otherwise 7.5 mg on Mon, Wed, Fri; 5 mg all other days   Weekly warfarin total 42.5 mg   Plan last modified Vicki Hearn RN (7/31/2018)   Next INR check 9/28/2018   Priority INR   Target end date Indefinite    Indications   Atrial fibrillation (H) [I48.91] [I48.91]  Long-term (current) use of anticoagulants [Z79.01] [Z79.01]          Anticoagulation Episode Summary     INR check location     Preferred lab     Send INR reminders to [facility]    Comments HIPPA Infor returned. OK to leave message on cell [phone]. OK to leave messages with Seferion Manning [phone]  labs @ [facility] faxed there 2/16.  fax: [phone]  Goes by [facility]      Anticoagulation Care Providers     Provider Role Specialty Phone number    Clarisse Valdes MD Responsible Cardiology [phone]            See the Encounter Report to view Anticoagulation Flowsheet and Dosing Calendar (Go to Encounters tab in chart review, and find the Anticoagulation Therapy Visit)    Spoke to home care nurse, Marychuy.    Darien Ballard, RN             9/27/18  1:30  Addendum  Phoned OH Perrin.  INR in the clinic today is 1.4.  Did not phone patient.  Did not change recommendation.    Darien Ballard,RN

## 2018-09-27 NOTE — MR AVS SNAPSHOT
Tad Tim Manning   9/27/2018   Anticoagulation Therapy Visit    Description:  77 year old male   Provider:  Darien Ballard RN   Department:  [facility]           INR as of 9/27/2018     Today's INR 1.3!      Anticoagulation Summary as of 9/27/2018     INR goal 2.0-3.0   Today's INR 1.3!   Full warfarin instructions 9/27: 10 mg; Otherwise 7.5 mg on Mon, Wed, Fri; 5 mg all other days   Next INR check 9/28/2018    Indications   Atrial fibrillation (H) [I48.91] [I48.91]  Long-term (current) use of anticoagulants [Z79.01] [Z79.01]         September 2018 Details    Sun Mon Tue Wed Thu Fri Sat           1                 2               3               4               5               6               7               8                 9               10               11               12               13               14               15                 16               17               18               19               20               21               22                 23               24               25               26               27      10 mg   See details      28 29 30                      Date Details   09/27 This INR check       Date of next INR:  9/28/2018         How to take your warfarin dose     To take:  7.5 mg Take 1.5 of the 5 mg tablets.    To take:  10 mg Take 2 of the 5 mg tablets.

## 2018-09-28 ENCOUNTER — ANTICOAGULATION THERAPY VISIT (OUTPATIENT)
Dept: ANTICOAGULATION | Facility: CLINIC | Age: 78
End: 2018-09-28

## 2018-09-28 DIAGNOSIS — I48.91 ATRIAL FIBRILLATION, UNSPECIFIED TYPE (H): ICD-10-CM

## 2018-09-28 DIAGNOSIS — Z79.01 LONG-TERM (CURRENT) USE OF ANTICOAGULANTS: ICD-10-CM

## 2018-09-28 NOTE — PROGRESS NOTES
ANTICOAGULATION FOLLOW-UP CLINIC VISIT    Patient Name:  Tad Manning  Date:  9/28/2018  Contact Type:  Telephone    SUBJECTIVE:     Patient Findings     Comments Tad is drinking Ensure occasionally.  He will try to keep consistent and plans to drink about 1/3 of a can/day.  Home care was not able to get a POC INR today after 2 trys.  Since he had two INR checks yesterday, will give recommendations off of that result and home care will recheck INR 10/1/18.           OBJECTIVE    INR   Date Value Ref Range Status   09/27/2018 1.40 (H) 0.86 - 1.14 Final       ASSESSMENT / PLAN  INR assessment SUB    Recheck INR In: 3 DAYS    INR Location Homecare INR      Anticoagulation Summary as of 9/28/2018     INR goal 2.0-3.0   Today's INR    Warfarin maintenance plan 7.5 mg (5 mg x 1.5) on Mon, Wed, Fri; 5 mg (5 mg x 1) all other days   Full warfarin instructions 7.5 mg on Mon, Wed, Fri; 5 mg all other days   Weekly warfarin total 42.5 mg   Plan last modified Vicki Hearn RN (7/31/2018)   Next INR check 10/1/2018   Priority INR   Target end date Indefinite    Indications   Atrial fibrillation (H) [I48.91] [I48.91]  Long-term (current) use of anticoagulants [Z79.01] [Z79.01]         Anticoagulation Episode Summary     INR check location     Preferred lab     Send INR reminders to [facility]    Comments HIPPA Infor returned. OK to leave message on cell [phone]. OK to leave messages with Seferino Manning [phone]  labs @ [facility] faxed there 2/16.  fax: [phone]  Goes by [facility]      Anticoagulation Care Providers     Provider Role Specialty Phone number    Clarisse Valdes MD Responsible Cardiology [phone]            See the Encounter Report to view Anticoagulation Flowsheet and Dosing Calendar (Go to Encounters tab in chart review, and find the Anticoagulation Therapy Visit)    Spoke with OH Perrin.  Tad is drinking Ensure occasionally.  He will try to keep consistent and  plans to drink about 1/3 of a can/day.  Home care was not able to get a POC INR today after 2 trys.  Since he had two INR checks yesterday, will give recommendations off of that result and home care will recheck INR 10/1/18.    Yoselin Hunter RN

## 2018-09-28 NOTE — MR AVS SNAPSHOT
Tad Tim Manning   9/28/2018   Anticoagulation Therapy Visit    Description:  77 year old male   Provider:  Yoselin Hunter, RN   Department:  [facility]           INR as of 9/28/2018     Today's INR       Anticoagulation Summary as of 9/28/2018     INR goal 2.0-3.0   Today's INR    Full warfarin instructions 7.5 mg on Mon, Wed, Fri; 5 mg all other days   Next INR check 10/1/2018    Indications   Atrial fibrillation (H) [I48.91] [I48.91]  Long-term (current) use of anticoagulants [Z79.01] [Z79.01]         September 2018 Details    Sun Mon Tue Wed Thu Fri Sat           1                 2               3               4               5               6               7               8                 9               10               11               12               13               14               15                 16               17               18               19               20               21               22                 23               24               25               26               27               28      7.5 mg   See details      29      5 mg           30      5 mg                Date Details   09/28 This INR check               How to take your warfarin dose     To take:  5 mg Take 1 of the 5 mg tablets.    To take:  7.5 mg Take 1.5 of the 5 mg tablets.           October 2018 Details    Sun Mon Tue Wed Thu Fri Sat      1            2               3               4               5               6                 7               8               9               10               11               12               13                 14               15               16               17               18               19               20                 21               22               23               24               25               26               27                 28               29               30               31                   Date Details   No additional details     Date of next INR:  10/1/2018         How to take your warfarin dose     To take:  7.5 mg Take 1.5 of the 5 mg tablets.

## 2018-09-30 ENCOUNTER — HOSPITAL ENCOUNTER (INPATIENT)
Facility: CLINIC | Age: 78
LOS: 16 days | Discharge: NURSING, REHAB OR LONG-TERM CARE | DRG: 854 | End: 2018-10-16
Attending: INTERNAL MEDICINE | Admitting: INTERNAL MEDICINE
Payer: MEDICARE

## 2018-09-30 DIAGNOSIS — D64.9 ANEMIA, UNSPECIFIED TYPE: ICD-10-CM

## 2018-09-30 DIAGNOSIS — M86.171 OSTEOMYELITIS OF FOOT, RIGHT, ACUTE (H): Primary | ICD-10-CM

## 2018-09-30 DIAGNOSIS — K59.01 SLOW TRANSIT CONSTIPATION: ICD-10-CM

## 2018-09-30 DIAGNOSIS — K21.9 GASTROESOPHAGEAL REFLUX DISEASE, ESOPHAGITIS PRESENCE NOT SPECIFIED: ICD-10-CM

## 2018-09-30 DIAGNOSIS — I48.91 ATRIAL FIBRILLATION, UNSPECIFIED TYPE (H): ICD-10-CM

## 2018-09-30 DIAGNOSIS — B36.9 SKIN DISEASE, FUNGAL: ICD-10-CM

## 2018-09-30 DIAGNOSIS — E11.42 TYPE 2 DIABETES MELLITUS WITH DIABETIC POLYNEUROPATHY, WITHOUT LONG-TERM CURRENT USE OF INSULIN (H): ICD-10-CM

## 2018-09-30 LAB
GLUCOSE BLDC GLUCOMTR-MCNC: 337 MG/DL (ref 70–99)
LACTATE BLD-SCNC: 2.1 MMOL/L (ref 0.7–2)

## 2018-09-30 PROCEDURE — 83605 ASSAY OF LACTIC ACID: CPT | Performed by: INTERNAL MEDICINE

## 2018-09-30 PROCEDURE — 87040 BLOOD CULTURE FOR BACTERIA: CPT | Performed by: INTERNAL MEDICINE

## 2018-09-30 PROCEDURE — 12000000 ZZH R&B MED SURG/OB

## 2018-09-30 PROCEDURE — 25000128 H RX IP 250 OP 636: Performed by: INTERNAL MEDICINE

## 2018-09-30 PROCEDURE — 36415 COLL VENOUS BLD VENIPUNCTURE: CPT | Performed by: INTERNAL MEDICINE

## 2018-09-30 PROCEDURE — 99223 1ST HOSP IP/OBS HIGH 75: CPT | Mod: AI | Performed by: INTERNAL MEDICINE

## 2018-09-30 PROCEDURE — 00000146 ZZHCL STATISTIC GLUCOSE BY METER IP

## 2018-09-30 PROCEDURE — 85610 PROTHROMBIN TIME: CPT | Performed by: INTERNAL MEDICINE

## 2018-09-30 RX ORDER — CLOPIDOGREL BISULFATE 75 MG/1
75 TABLET ORAL DAILY
Status: DISCONTINUED | OUTPATIENT
Start: 2018-10-01 | End: 2018-10-06

## 2018-09-30 RX ORDER — NICOTINE POLACRILEX 4 MG
15-30 LOZENGE BUCCAL
Status: DISCONTINUED | OUTPATIENT
Start: 2018-09-30 | End: 2018-10-16 | Stop reason: HOSPADM

## 2018-09-30 RX ORDER — NALOXONE HYDROCHLORIDE 0.4 MG/ML
.1-.4 INJECTION, SOLUTION INTRAMUSCULAR; INTRAVENOUS; SUBCUTANEOUS
Status: DISCONTINUED | OUTPATIENT
Start: 2018-09-30 | End: 2018-10-12

## 2018-09-30 RX ORDER — ONDANSETRON 2 MG/ML
4 INJECTION INTRAMUSCULAR; INTRAVENOUS EVERY 6 HOURS PRN
Status: DISCONTINUED | OUTPATIENT
Start: 2018-09-30 | End: 2018-10-16 | Stop reason: HOSPADM

## 2018-09-30 RX ORDER — ONDANSETRON 4 MG/1
4 TABLET, ORALLY DISINTEGRATING ORAL EVERY 6 HOURS PRN
Status: DISCONTINUED | OUTPATIENT
Start: 2018-09-30 | End: 2018-10-16 | Stop reason: HOSPADM

## 2018-09-30 RX ORDER — DIAPER,BRIEF,INFANT-TODD,DISP
EACH MISCELLANEOUS 2 TIMES DAILY PRN
COMMUNITY
End: 2021-09-22

## 2018-09-30 RX ORDER — METOPROLOL SUCCINATE 100 MG/1
100 TABLET, EXTENDED RELEASE ORAL DAILY
Status: DISCONTINUED | OUTPATIENT
Start: 2018-09-30 | End: 2018-10-16 | Stop reason: HOSPADM

## 2018-09-30 RX ORDER — ATORVASTATIN CALCIUM 20 MG/1
20 TABLET, FILM COATED ORAL DAILY
Status: DISCONTINUED | OUTPATIENT
Start: 2018-10-01 | End: 2018-10-16 | Stop reason: HOSPADM

## 2018-09-30 RX ORDER — DEXTROSE MONOHYDRATE 25 G/50ML
25-50 INJECTION, SOLUTION INTRAVENOUS
Status: DISCONTINUED | OUTPATIENT
Start: 2018-09-30 | End: 2018-10-16 | Stop reason: HOSPADM

## 2018-09-30 RX ORDER — PIPERACILLIN SODIUM, TAZOBACTAM SODIUM 3; .375 G/15ML; G/15ML
3.38 INJECTION, POWDER, LYOPHILIZED, FOR SOLUTION INTRAVENOUS EVERY 6 HOURS
Status: DISCONTINUED | OUTPATIENT
Start: 2018-09-30 | End: 2018-10-09

## 2018-09-30 RX ORDER — OXYCODONE HYDROCHLORIDE 5 MG/1
5-10 TABLET ORAL
Status: DISCONTINUED | OUTPATIENT
Start: 2018-09-30 | End: 2018-10-16 | Stop reason: HOSPADM

## 2018-09-30 RX ORDER — ACETAMINOPHEN 325 MG/1
650 TABLET ORAL EVERY 4 HOURS PRN
Status: DISCONTINUED | OUTPATIENT
Start: 2018-09-30 | End: 2018-10-16 | Stop reason: HOSPADM

## 2018-09-30 RX ORDER — CITALOPRAM HYDROBROMIDE 10 MG/1
10 TABLET ORAL DAILY
Status: DISCONTINUED | OUTPATIENT
Start: 2018-10-01 | End: 2018-10-16 | Stop reason: HOSPADM

## 2018-09-30 RX ORDER — AMOXICILLIN 250 MG
1 CAPSULE ORAL 2 TIMES DAILY PRN
Status: DISCONTINUED | OUTPATIENT
Start: 2018-09-30 | End: 2018-10-16 | Stop reason: HOSPADM

## 2018-09-30 RX ORDER — METOPROLOL SUCCINATE 100 MG/1
100 TABLET, EXTENDED RELEASE ORAL DAILY
Status: DISCONTINUED | OUTPATIENT
Start: 2018-10-01 | End: 2018-09-30

## 2018-09-30 RX ORDER — AMOXICILLIN 250 MG
2 CAPSULE ORAL 2 TIMES DAILY PRN
Status: DISCONTINUED | OUTPATIENT
Start: 2018-09-30 | End: 2018-10-16 | Stop reason: HOSPADM

## 2018-09-30 RX ADMIN — PIPERACILLIN SODIUM,TAZOBACTAM SODIUM 3.38 G: 3; .375 INJECTION, POWDER, FOR SOLUTION INTRAVENOUS at 22:57

## 2018-09-30 NOTE — IP AVS SNAPSHOT
` [place] [address] [facility]: [phone]                                              INTERAGENCY TRANSFER FORM - NURSING   2018                    Hospital Admission Date: 2018  PETER BUTLER   : 1940  Sex: Male        Attending Provider: Mikael Kendall MD     Allergies:  Blood Transfusion Related (Informational Only)    Infection:  None   Service:  HOSPITALIST    Ht:  1.829 m (6')   Wt:  79.3 kg (174 lb 13.2 oz)   Admission Wt:  80.5 kg (177 lb 8 oz)    BMI:  23.71 kg/m 2   BSA:  2.01 m 2            Patient PCP Information     Provider PCP Type    Gene Guevara MD General      Current Code Status     Date Active Code Status Order ID Comments User Context       Prior      Code Status History     Date Active Date Inactive Code Status Order ID Comments User Context    10/15/2018  3:46 PM  Full Code [id number]  Abdiel Alejandro MD Outpatient    2018 10:04 PM 10/15/2018  3:46 PM Full Code [id number]  Trip Ruby DO Inpatient    2018  5:00 AM 2018  5:59 PM Full Code [id number]  Russell Joyce MD Inpatient    2017 11:04 AM 2018  5:00 AM Full Code [id number]  Derek Navas MD Outpatient    2017  8:15 PM 2017 11:04 AM Full Code [id number]  Derek Navas MD Inpatient    3/18/2017  9:48 PM 2017  8:15 PM Full Code [id number]  Dale Leroy MD Outpatient    2/15/2017  3:30 PM 3/18/2017  9:48 PM Full Code [id number]  Hyun Koehler MD Inpatient    2/15/2017 11:43 AM 2/15/2017  3:30 PM Full Code [id number]  Jeaneth Mesa MD Outpatient    2017 10:43 PM 2/15/2017 11:43 AM Full Code [id number]  Ayse Scott PA-C Outpatient    2017  9:45 PM 2017 10:43 PM Full Code [id number]  Moody Beckford MD ED    2017  3:32 PM 2017  9:45 PM Full Code [id number]  Patria Dee MD Outpatient    2017  6:43 PM 2017  3:32 PM Full Code [id number]  Kalia Barrientos DO Inpatient    1/10/2014   5:40 PM 1/14/2014  5:20 PM Full Code [id number]  Kiya Betts RN Inpatient      Advance Directives        Scanned docmt in ACP Activity?           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 on 9/22/18 Medium  1/11/2014 Yes    HTN (hypertension) Medium  1/11/2014 Yes    CAD -- S/P CABG Medium  1/11/2014 Yes    Osteomyelitis left 2nd Toe s/p amputaion 1/10/14 Medium  1/11/2014 Yes    CRF (chronic renal failure), stage 3 (moderate) (H) Medium  1/20/2017 Yes    * (Principal)Osteomyelitis Right Foot -- S/P BKA 10/11/18 Medium  2/15/2017 Yes    Chronic systolic heart failure (H) Medium  6/6/2017 Yes    Atrial fibrillation -- on Warfarin Medium  2/5/2018 Yes      Non-Hospital Problems as of 10/16/2018              Priority Class Noted    Critical lower limb ischemia , s/p L AK pop to BK pop bypass with rGSV on 1/10/2014 Medium  1/10/2014    Hyperlipidemia with target LDL less than 100 Medium  1/11/2014    Fall Medium  2/7/2017    Discitis Medium  2/8/2017    Epidural abscess Medium  2/9/2017    Atrial flutter (H) Medium  2/22/2017    Acute respiratory failure with hypoxia (H) Medium  4/6/2017    Long-term (current) use of anticoagulants [Z79.01] Medium  2/5/2018    CAD S/P percutaneous coronary angioplasty Medium  3/6/2018    STORM (acute kidney injury) (H) Medium  9/22/2018      Immunizations     Name Date      HepA-Adult 11/03/08     Influenza (High Dose) 3 valent vaccine 09/24/18     Influenza (High Dose) 3 valent vaccine 11/20/17     Influenza (High Dose) 3 valent vaccine 11/29/16     Influenza Vaccine IM 3yrs+ 4 Valent IIV4 11/15/15     Pneumo Conj 13-V (2010&after) 12/21/15     Pneumococcal 23 valent 12/05/11     TDAP Vaccine (Boostrix) 12/22/14     Td (Adult), Adsorbed 07/25/03     Zoster vaccine, live 12/05/11          END      ASSESSMENT     Discharge Profile Flowsheet     EXPECTED DISCHARGE     Inspection of bony  "prominences  Full 10/16/18 0951    Expected Discharge Date  10/16/18 ([facility]) 10/12/18 1523   Inspection under devices  Full 10/16/18 0951    DISCHARGE NEEDS ASSESSMENT     Skin WDL  ex 10/16/18 0951    Equipment Currently Used at Home  walker, standard 10/15/18 0845   Skin Temperature  warm 10/16/18 0951    Transportation Available  car;family or friend will provide 10/15/18 0845   Skin Moisture  dry 10/16/18 0951    Equipment Used at Home  none 01/11/14 1359   Skin Elasticity  quick return to original state 10/16/18 0951    GASTROINTESTINAL (ADULT,PEDIATRIC,OB)     Skin Integrity  bruise(s);incision(s) 10/16/18 0951    GI WDL  WDL 10/16/18 0951   Additional Documentation  Pressure Ulcer (LDA) 10/16/18 0246    All Quadrants Bowel Sounds  audible and active in all quadrants 10/16/18 0951   Full except areas not inspected   -- (mepilex on coccyx) 10/16/18 0246    Last Bowel Movement  10/16/18 10/16/18 0951   Procedural focused assessment (identify areas inspected)   Spine;Hip, left;Hip, right;Buttock, left;Buttock, right;Sacrum;Coccyx;Heel, left;Foot, left 10/12/18 0256    GI Signs/Symptoms  constipation 10/16/18 0246   Not Inspected under devices  -- (dressings) 10/14/18 1958    Passing flatus  yes 10/16/18 0951   SAFETY      COMMUNICATION ASSESSMENT     Safety WDL  WDL 10/16/18 0951    Patient's communication style  spoken language (English or Bilingual) 09/22/18 0513   All Alarms  alarm(s) activated and audible 10/16/18 0951    SKIN     Safety Factors  ID band on 10/16/18 0951                 Assessment WDL (Within Defined Limits) Definitions           Safety WDL     Effective: 09/28/15    Row Information: <b>WDL Definition:</b> Bed in low position, wheels locked; call light in reach; upper side rails up x 2; ID band on<br> <font color=\"gray\"><i>Item=AS safety wdl>>List=AS safety wdl>>Version=F14</i></font>      Skin WDL     Effective: 09/28/15    Row Information: <b>WDL Definition:</b> Warm; dry; intact; elastic; " "without discoloration; pressure points without redness<br> <font color=\"gray\"><i>Item=AS skin wdl>>List=AS skin wdl>>Version=F14</i></font>      Vitals     Vital Signs Flowsheet     VITAL SIGNS     Height Method  Stated 09/30/18 2206    Temp  97.9  F (36.6  C) 10/16/18 1100   Weight  79.3 kg (174 lb 13.2 oz) 10/16/18 0546    Temp src  Oral 10/16/18 1100   Weight Method  Bed scale 10/16/18 0527    Resp  16 10/16/18 1426   Bed Scale  -- (2 pillows) 10/16/18 0527    Pulse  65 10/16/18 1100   BSA (Calculated - sq m)  2.02 09/30/18 2206    Heart Rate  66 10/16/18 0038   BMI (Calculated)  24.12 09/30/18 2206    Pulse/Heart Rate Source  Monitor 10/16/18 0038   POSITIONING      BP  136/64 10/16/18 1100   Body Position  independently positioning 10/16/18 0951    BP Location  Right arm 10/16/18 0038   Head of Bed (HOB)  HOB at 20 degrees 10/16/18 0527    Patient Position  Lying 10/11/18 1413   Positioning/Transfer Devices  pillows;in use 10/16/18 0527    OXYGEN THERAPY     Chair  Upright in chair 10/16/18 0951    SpO2  98 % 10/16/18 1100   DAILY CARE      O2 Device  None (Room air) 10/16/18 1100   Activity Management  activity adjusted per tolerance 10/16/18 0951    Oxygen Delivery  2 LPM 10/12/18 0638   Activity Assistance Provided  assistance, 1 person 10/16/18 0951    PAIN/COMFORT     Assistive Device Utilized  gait belt 10/15/18 2018    Patient Currently in Pain  yes 10/16/18 1426   CLINICALLY ALIGNED PAIN ASSESSMENT (CAPA) ([facility], [facility] AND [facility] ADULTS ONLY)      Preferred Pain Scale  number (Numeric Rating Pain Scale) 10/16/18 1426   Comfort  comfortably manageable 10/05/18 2348    Patient's Stated Pain Goal  3 10/16/18 1205   Change in Pain  getting better 10/05/18 2348    0-10 Pain Scale  5 10/16/18 1414   YOGI COMA SCALE      Pain Location  Leg 10/16/18 1426   Best Eye Response  4-->(E4) spontaneous 10/13/18 1431    Pain Orientation  Right 10/16/18 1426   Best Motor Response  6-->(M6) obeys commands 10/13/18 " 1431    Pain Descriptors  Aching 10/16/18 1426   Best Verbal Response  5-->(V5) oriented 10/13/18 1431    Pain Management Interventions  pillow support provided 10/15/18 1713   [place] Coma Scale Score  15 10/13/18 1431    Pain Intervention(s)  Medication (See eMAR) 10/16/18 1426   ECG      Response to Interventions  Decrease in pain 10/16/18 1205   ECG Rhythm  Sinus rhythm (BBB) 10/16/18 1214    ANALGESIA SIDE EFFECTS MONITORING     Ectopy  None 10/11/18 1420    Side Effects Monitoring: Respiratory Quality  R 10/16/18 1426   Equipment  electrodes changed;telemetry batteries changed 10/16/18 0910    Side Effects Monitoring: Respiratory Depth  N 10/16/18 1426   Lead Monitored  Lead II 10/11/18 1420    Side Effects Monitoring: Sedation Level  1 10/16/18 1426   RESPIRATORY MONITORING      HEIGHT AND WEIGHT     Respiratory Monitoring (EtCO2)  41 mmHg 10/11/18 1736    Height  1.829 m (6') 09/30/18 2206   Integrated Pulmonary Index (IPI)  10 10/11/18 1736            Patient Lines/Drains/Airways Status    Active LINES/DRAINS/AIRWAYS     Name: Placement date: Placement time: Site: Days: Last dressing change:    Closed/Suction Drain 1 Back 10 [place] 02/12/17   1105   Back   611     Peripheral IV 10/08/18 Left Hand 10/08/18   1355   Hand   8     Peripheral IV 10/11/18 Left Lower forearm 10/11/18   1250   Lower forearm   5     Pressure Injury 09/30/18 Left Buttocks 09/30/18   2214    15     Wound 02/09/17 Posterior Coccyx Suspected pressure ulcer nonblanchable redness 02/09/17   0100   Coccyx   614     Wound 02/15/17 Mid Coccyx Suspected pressure ulcer non blanchable open area 02/15/17   2017   Coccyx   607     Wound 02/19/17 Right Buttocks open area on right buttock 02/19/17   1430   Buttocks   603     Wound 03/08/17 Lateral;Right Foot 2 scabbed areas on plantar area on smallest toe and below that. 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 03/08/17   0859   Foot   587     Wound 10/12/18 Left Knee small dry scab 10/12/18   1653   Knee    3     Rash 03/14/17 2000 Bilateral anterior groin other (see comments) 03/14/17 2000    580     Incision/Surgical Site 02/01/16 Neck 02/01/16   1333    987     Incision/Surgical Site 02/01/16 Right Chest 02/01/16   1350    987     Incision/Surgical Site 02/09/17 Midline;Mid Back 02/09/17   0341    614     Incision/Surgical Site 02/09/17 Bilateral Back 02/09/17   0354    614     Incision/Surgical Site 02/12/17 Mid Back 02/12/17   1125    611     Incision/Surgical Site 02/15/17 Mid Back 02/15/17   2016    607     Incision/Surgical Site 10/11/18 Right Leg 10/11/18   1317    5             Patient Lines/Drains/Airways Status    Active PICC/CVC     None            Intake/Output Detail Report     Date Intake         Output     Net    Shift P.O. I.V. IV Piggyback Colloid Blood Components Total Urine Drains Blood Total       Noc 10/14/18 2300 - 10/15/18 0659 120 80 -- -- -- 200 250 5 -- 255 -55    Day 10/15/18 0700 - 10/15/18 1459 -- -- -- -- -- -- 500 -- -- 500 -500    Ashwini 10/15/18 1500 - 10/15/18 2259 970 -- -- -- -- 970 475 -- -- 475 495    Noc 10/15/18 2300 - 10/16/18 0659 100 3 -- -- -- 103 325 -- -- 325 -222    Day 10/16/18 0700 - 10/16/18 1459 240 -- -- -- -- 240 375 -- -- 375 -135      Last Void/BM       Most Recent Value    Urine Occurrence 1 at 10/16/2018 0546    Stool Occurrence 1 at 10/16/2018 0546      Case Management/Discharge Planning     Case Management/Discharge Planning Flowsheet     REFERRAL INFORMATION     DISCHARGE PLANNING      Arrived From  admitted as an inpatient 02/17/17 1421   Transportation Available  car;family or friend will provide 10/15/18 0845    CTS Assigned to Case  [facility] 10/02/18 1007   Equipment Used at Home  none 01/11/14 1359    LIVING ENVIRONMENT     FINAL RESOURCES      Lives With  significant other (lives with roommate Patrice ) 10/15/18 0845   Equipment Currently Used at Home  walker, standard 10/15/18 0845    Living Arrangements  apartment 10/15/18 0845   ABUSE RISK SCREEN       COPING/STRESS     QUESTION TO PATIENT:  Has a member of your family or a partner(now or in the past) intimidated, hurt, manipulated, or controlled you in any way?  no 09/30/18 2217    Major Change/Loss/Stressor  loss of independence 09/30/18 2217   QUESTION TO PATIENT: Do you feel safe going back to the place where you are living?  yes 09/30/18 2217    Patient Personal Strengths  assertive;humor;motivated;positive attitude;strong support system 01/13/14 1438   OBSERVATION: Is there reason to believe there has been maltreatment of a vulnerable adult (ie. Physical/Sexual/Emotional abuse, self neglect, lack of adequate food, shelter, medical care, or financial exploitation)?  no 09/30/18 2217    EXPECTED DISCHARGE     OTHER      Expected Discharge Date  10/16/18 ([facility]U) 10/12/18 1523   Are you depressed or being treated for depression?  No 09/30/18 2217

## 2018-09-30 NOTE — IP AVS SNAPSHOT
[place] [address] [facility]    [address] [place] [address]    Phone:  [phone]                                       After Visit Summary   9/30/2018    Tad Manning    MRN: [id number]           After Visit Summary Signature Page     I have received my discharge instructions, and my questions have been answered. I have discussed any challenges I see with this plan with the nurse or doctor.    ..........................................................................................................................................  Patient/Patient Representative Signature      ..........................................................................................................................................  Patient Representative Print Name and Relationship to Patient    ..................................................               ................................................  Date                                   Time    ..........................................................................................................................................  Reviewed by Signature/Title    ...................................................              ..............................................  Date                                               Time          22EPIC Rev 08/18

## 2018-09-30 NOTE — IP AVS SNAPSHOT
Tad Manning #[id number] (CSN:[id number])  (77 year old M)  (Adm: 18)     [id number]               [place] [address] SURGICAL SPECIALITIES: [phone]            Patient Demographics     Patient Name Sex          Age SSN Address Phone    Tad Manning Male 1940 (77 year old) [id number] [address]   [facility] [address] [phone] (Home)  NONE (Work)  [phone] (Mobile) *Preferred*      Emergency Contact(s)     Name Relation Home Work Mobile    Seferino Manning [phone] none [phone]    Joyce Manning [phone] none [phone]    Chris Fitzgerald Friend [phone] none none      Admission Information     Attending Provider Admitting Provider Admission Type Admission Date/Time    Mikael Kendall MD [place], Justin Bennett, DO Urgent 18  2151    Discharge Date Hospital Service Auth/Cert Status Service Area     Hospitalist [facility]    Unit Room/Bed Admission Status       [facility] [id number] Admission (Confirmed)       Admission     Complaint    Cellulitis Right Lower Extremity, Osteomyelitis (H), DIABETIC RIGHT FOOR WITH OSTEOMYLETIS, DIABETIC RIGHT FOOR WITH OSTEOMYLETIS, DIABETIC RIGHT FOOT WITH OSTEOMYLETIS      Hospital Account     Name Acct ID Class Status Primary Coverage    Tad Manning [id number] Inpatient Open MEDICARE - MEDICARE            Guarantor Account (for Hospital Account #[id number])     Name Relation to Pt Service Area Active? Acct Type    Tad Manning Self FCS Yes Personal/Family    Address Phone          [address] [place] [address] [phone](H)              Coverage Information (for Hospital Account #[id number])     1. MEDICARE/MEDICARE     F/O Payor/Plan Precert #    MEDICARE/MEDICARE     Subscriber Subscriber #    Tad Manning [id number]    Address Phone    ATTN CLAIMS  [address]  [place] [address] [phone]          2. [place]     F/[place] Payor/Plan Precert #    [place]     Subscriber Subscriber #    Tad Manning [id number]    Address Phone    [address] [address]  [place] [address] [phone]                                                      INTERAGENCY TRANSFER FORM - PHYSICIAN ORDERS   9/30/2018                       [place] [address] SURGICAL SPECIALITIES: [phone]            Attending Provider: Mikael Kendall MD     Allergies:  Blood Transfusion Related (Informational Only)    Infection:  None   Service:  HOSPITALIST    Ht:  1.829 m (6')   Wt:  79.3 kg (174 lb 13.2 oz)   Admission Wt:  80.5 kg (177 lb 8 oz)    BMI:  23.71 kg/m 2   BSA:  2.01 m 2            ED Clinical Impression     Diagnosis Description Comment Added By Time Added    Osteomyelitis of foot, right, acute (H) [M86.171] Osteomyelitis Right Foot -- S/P BKA 10/11/18  Abdiel Alejandro MD 10/15/2018  3:40 PM    Type 2 diabetes mellitus with diabetic polyneuropathy, without long-term current use of insulin (H) [E11.42] Type 2 diabetes mellitus with diabetic polyneuropathy, without long-term current use of insulin (H) [E11.42]  Abdiel Alejandro MD 10/15/2018  3:40 PM    Skin disease, fungal [B36.9] Skin disease, fungal [B36.9]  Abdiel Alejandro MD 10/15/2018  3:42 PM    Anemia, unspecified type [D64.9] Anemia, unspecified type [D64.9]  Abdiel Alejandro MD 10/15/2018  3:42 PM    Slow transit constipation [K59.01] Slow transit constipation [K59.01]  Abdiel Alejandro MD 10/15/2018  3:42 PM    Gastroesophageal reflux disease, esophagitis presence not specified [K21.9] Gastroesophageal reflux disease, esophagitis presence not specified [K21.9]  Abdiel Alejandro MD 10/15/2018  3:48 PM    Atrial fibrillation, unspecified type (H) [I48.91] Atrial fibrillation, unspecified type (H) [I48.91]  Abdiel Alejandro MD 10/15/2018  3:49 PM       Hospital Problems as of 10/16/2018              Priority Class Noted POA    DM type 2 w neuropathy -- Hgb A1C 9.0 on 9/22/18 Medium  1/11/2014 Yes    HTN (hypertension) Medium  1/11/2014 Yes    CAD -- S/P CABG Medium  1/11/2014 Yes    Osteomyelitis left 2nd Toe s/p amputaion 1/10/14 Medium  1/11/2014 Yes    CRF (chronic renal failure), stage 3 (moderate) (H) Medium  1/20/2017 Yes    * (Principal)Osteomyelitis Right Foot -- S/P BKA 10/11/18 Medium  2/15/2017 Yes    Chronic systolic heart failure (H) Medium  6/6/2017 Yes    Atrial fibrillation -- on Warfarin Medium  2/5/2018 Yes      Non-Hospital Problems as of 10/16/2018              Priority Class Noted    Critical lower limb ischemia , s/p L AK pop to BK pop bypass with rGSV on 1/10/2014 Medium  1/10/2014    Hyperlipidemia with target LDL less than 100 Medium  1/11/2014    Fall Medium  2/7/2017    Discitis Medium  2/8/2017    Epidural abscess Medium  2/9/2017    Atrial flutter (H) Medium  2/22/2017    Acute respiratory failure with hypoxia (H) Medium  4/6/2017    Long-term (current) use of anticoagulants [Z79.01] Medium  2/5/2018    CAD S/P percutaneous coronary angioplasty Medium  3/6/2018    STORM (acute kidney injury) (H) Medium  9/22/2018      Code Status History     Date Active Date Inactive Code Status Order ID Comments User Context    10/15/2018  3:46 PM  Full Code [id number]  Abdiel Alejandro MD Outpatient    9/30/2018 10:04 PM 10/15/2018  3:46 PM Full Code [id number]  Trip Ruby DO Inpatient    9/22/2018  5:00 AM 9/24/2018  5:59 PM Full Code [id number]  Russell Joyce MD Inpatient    4/8/2017 11:04 AM 9/22/2018  5:00 AM Full Code [id number]  Derek Navas MD Outpatient    4/6/2017  8:15 PM 4/8/2017 11:04 AM Full Code [id number]  Derek Navas MD Inpatient    3/18/2017  9:48 PM 4/6/2017  8:15 PM Full Code [id number]  Dale Leroy MD Outpatient    2/15/2017  3:30 PM 3/18/2017  9:48 PM Full Code [id number]  Hyun Koehler MD Inpatient     2/15/2017 11:43 AM 2/15/2017  3:30 PM Full Code [id number]  Jeaneth Mesa MD Outpatient    2/8/2017 10:43 PM 2/15/2017 11:43 AM Full Code [id number]  Ayse Scott PA-C Outpatient    2/7/2017  9:45 PM 2/8/2017 10:43 PM Full Code [id number]  Moody Beckford MD ED    1/23/2017  3:32 PM 2/7/2017  9:45 PM Full Code [id number]  Patria Dee MD Outpatient    1/20/2017  6:43 PM 1/23/2017  3:32 PM Full Code [id number]  Kalia Barrientos DO Inpatient    1/10/2014  5:40 PM 1/14/2014  5:20 PM Full Code [id number]  Kiya Betts RN Inpatient      Current Code Status     Date Active Code Status Order ID Comments User Context       Prior      Summary of Visit     Reason for your hospital stay       1. 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  2. Acute blood loss anemia on chronic anemia.  3. Ischemic cardiomyopathy with acute on chronic biventricular systolic CHF exacerbation.  4. Acute kidney injury, stage stage 2 on CKD stage 3.  5. Physical deconditioning with h/o mechanical falls due to acute illness/chronic debilitation.                Medication Review      START taking        Dose / Directions Comments    bisacodyl 10 MG Suppository   Commonly known as:  DULCOLAX   Used for:  Slow transit constipation        Dose:  10 mg   Place 1 suppository (10 mg) rectally daily as needed for constipation   Quantity:  30 suppository   Refills:  0        ferrous sulfate 325 (65 Fe) MG tablet   Commonly known as:  IRON   Used for:  Anemia, unspecified type        Dose:  325 mg   Take 1 tablet (325 mg) by mouth daily   Quantity:  100 tablet   Refills:  0        * insulin aspart 100 UNIT/ML injection   Commonly known as:  NovoLOG PEN        Dose:  10 Units   Inject 10 Units Subcutaneous 3 times daily (with meals)   Refills:  0        * insulin aspart 100 UNIT/ML injection    Commonly known as:  NovoLOG FLEXPEN        Novolog Flexpen Give before meals and before bed: For Pre-Meal Glucose: 140-189 give 1 unit  190-239 give 2 units  240-289 give 3 units  290-339 give 4 units  = or >340 give 5 units   For Bedtime Glucose 200 - 239 give 1 units  240 - 289 give 1.5 units 290 - 339 give 2 units = or >340 give 2.5 units   Quantity:  15 mL   Refills:  0        insulin glargine 100 UNIT/ML injection   Commonly known as:  LANTUS        Dose:  16 Units   Inject 16 Units Subcutaneous At Bedtime   Refills:  0        miconazole 2 % powder   Commonly known as:  MICATIN; MICRO GUARD   Used for:  Skin disease, fungal        Apply topically 3 times daily   Refills:  0        oxyCODONE IR 5 MG tablet   Commonly known as:  ROXICODONE   Used for:  Osteomyelitis of foot, right, acute (H)        Dose:  5-10 mg   Take 1-2 tablets (5-10 mg) by mouth every 4 hours as needed for pain   Quantity:  20 tablet   Refills:  0        pantoprazole 40 MG EC tablet   Commonly known as:  PROTONIX   Used for:  Gastroesophageal reflux disease, esophagitis presence not specified        Dose:  40 mg   Take 1 tablet (40 mg) by mouth daily   Quantity:  30 tablet   Refills:  0        polyethylene glycol Packet   Commonly known as:  MIRALAX/GLYCOLAX   Used for:  Slow transit constipation        Dose:  17 g   Take 17 g by mouth 2 times daily   Quantity:  7 packet   Refills:  0    Hold if loose stools/diarrhea.       PROVIDER DOSED MANAGED WARFARIN (COUMADIN) OUTPATIENT        Dose based on INR per Primary MD/Nursing Home MD.   Refills:  0    INR goal 2-3 for afib.       sennosides 8.6 MG tablet   Commonly known as:  SENOKOT   Used for:  Slow transit constipation        Dose:  2 tablet   Take 2 tablets by mouth 2 times daily   Quantity:  120 each   Refills:  0    Hold if loose stools/diarrhea.       * Notice:  This list has 2 medication(s) that are the same as other medications prescribed for you. Read the directions carefully, and  ask your doctor or other care provider to review them with you.      CONTINUE these medications which have NOT CHANGED        Dose / Directions Comments    acetaminophen 325 MG tablet   Commonly known as:  TYLENOL        Dose:  325 mg   Take 325 mg by mouth every 4 hours as needed for mild pain   Refills:  0        atorvastatin 20 MG tablet   Commonly known as:  LIPITOR   Used for:  Claudication of left lower extremity (H)        TAKE 1 TABLET BY MOUTH EVERY DAY   Quantity:  90 tablet   Refills:  3        CELEXA PO        Dose:  10 mg   Take 10 mg by mouth daily   Refills:  0        cholecalciferol 1000 units Tabs   Used for:  Loop diuretic causing adverse effect in therapeutic use, subsequent encounter        Dose:  1000 Units   Take 1,000 Units by mouth daily   Quantity:  30 tablet   Refills:  0        clopidogrel 75 MG tablet   Commonly known as:  PLAVIX   Used for:  Status post percutaneous transluminal coronary angioplasty, Coronary artery disease involving native coronary artery of native heart without angina pectoris        Dose:  75 mg   Take 1 tablet (75 mg) by mouth daily   Quantity:  90 tablet   Refills:  3        CORTIZONE-10 1 % ointment   Generic drug:  hydrocortisone        Apply topically 2 times daily as needed for itching   Refills:  0        furosemide 20 MG tablet   Commonly known as:  LASIX   Used for:  Chronic systolic heart failure (H)        Dose:  40 mg   Take 2 tablets (40 mg) by mouth daily   Quantity:  180 tablet   Refills:  3        MELATONIN PO        Dose:  5 mg   Take 5 mg by mouth daily   Refills:  0        metFORMIN 500 MG tablet   Commonly known as:  GLUCOPHAGE        Dose:  500 mg   Take 1 tablet (500 mg) by mouth 2 times daily (with meals)   Quantity:  60 tablet   Refills:  0        metoprolol succinate 200 MG 24 hr tablet   Commonly known as:  TOPROL-XL   Used for:  Chronic systolic heart failure (H)        Dose:  100 mg   Take 0.5 tablets (100 mg) by mouth daily   Quantity:  90  tablet   Refills:  3        nitroGLYcerin 0.4 MG sublingual tablet   Commonly known as:  NITROSTAT   Used for:  Atrial flutter, unspecified type (H)        For chest pain place 1 tablet under the tongue every 5 minutes for 3 doses. If symptoms persist 5 minutes after 1st dose call 911.   Quantity:  25 tablet   Refills:  0        order for DME   Used for:  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), Diabetic ulcer of left foot due to type 2 diabetes mellitus (H)        Equipment being ordered: DH2 shoe   Quantity:  1 Device   Refills:  0        sodium chloride 0.65 % nasal spray   Commonly known as:  EQ SALINE NASAL SPRAY        Dose:  1 spray   Spray 1 spray into both nostrils daily as needed for congestion   Quantity:  1 Bottle   Refills:  1        warfarin 5 MG tablet   Commonly known as:  COUMADIN   Used for:  Chronic atrial fibrillation (H), Long-term (current) use of anticoagulants        Take 0.5 tablets tonight 9/24 and 1 tab tomorrow night 9/25. INR check on Wednesday 9/26 and continue as directed by coumadin clinic.   Quantity:  45 tablet   Refills:  1          STOP taking     nystatin cream   Commonly known as:  MYCOSTATIN           ranitidine 150 MG tablet   Commonly known as:  ZANTAC                   After Care     Advance Diet as Tolerated       Follow this diet upon discharge: Orders Placed This Encounter      Snacks/Supplements Adult: Boost Glucose Control; Between Meals      Moderate Consistent CHO Diet         Daily weights       Call Provider for weight gain of more than 2 pounds per day or 5 pounds per week.       Discharge Instructions       Follow up with Dr. Vick in 2-4 weeks for suture removal and evaluation of stump       General info for SNF       Length of Stay Estimate: Short Term Care: Estimated # of Days <30  Condition at Discharge: Improving  Level of care:skilled   Rehabilitation Potential: Good  Admission H&P remains valid and up-to-date: Yes  Recent Chemotherapy: N/A  Use  "Nursing Home Standing Orders: Yes       Glucose monitor nursing POCT       Before meals and at bedtime       Mantoux instructions       Give two-step Mantoux (PPD) Per Facility Policy Yes       Wound care and dressings       Xeroform/Vasoline gauze with ABD pad directly to incision site. Wrap with kerlix.   Dressing changes Q2-3 days               Further instructions from your care team       Belongings at bedside: clothing;glasses;wallet;watch;walker; cell phone/electronics;dental appliance/dentures;money (\"about $100\" does not want to be sent to security )    General Recommendations To Control Heart Failure When You Get Home (Give at DC from U)     Heart Failure Instructions for Patients and Families: Please read and check off each of these important instructions as you do them when you get home.     Weight and Symptoms    ___ Put a scale in your bathroom.    ___ Post a weight chart or calendar next to your scale.    ___ Weigh yourself everyday as soon as you get up in the morning (before breakfast).  You should only be wearing your pajamas.  Write your weight on the chart/calendar.    ___ Bring your weight chart/calendar with you to all appointments.    ___ Call your doctor or nurse practitioner if you gain 2 pounds (in 1 day) or 5 pounds in (1 week) from your goal  good  weight.  Your good weight is also called your  dry  weight.  Your doctor or nurse will tell you what your good weight should be.    ___ Call your doctor or nurse practitioner if you have shortness of breath that gets worse over time, leg swelling or fatigue.    Medications and Diet    ___ Make sure to take your medication as prescribed.    ___ Bring a current list of your medication and all of your medicine bottles with you to all appointments.    ___ Limit fluids if you still have swelling or shortness of breath, or if your doctor tells you to do so.      ___ Eat less than 2000 mg of sodium (salt) every day. Read food labels, and do not add " salt to meals. Remember, if you eat less salt you retain less fluid.    ___ Follow a heart healthy diet that is low in saturated fat.         Activity and Suggested Lifestyle Changes   ___ Stay active. Talk to your doctor about an exercise program that is safe for your heart.    ___ Stop smoking. Reduce alcohol use.      ___ Lose weight if you are overweight. Extra weight puts a lot of stress on the heart.    Control for Leg Swelling  ___ Keep your legs elevated (raised) as needed for swelling. If swelling is uncomfortable or elevation doesn t help, ask your doctor about using ACE wraps or support stockings.      What is the [facility]?     Cardiomyopathy  Optimization  Rehabilitation  Education    The [facility] is a heart failure specialty clinic within [facility].  It is an outpatient disease management program that is based on a phase-by-phase approach, which is tailored to each patient s individual needs.  The cardiologist, nurse practitioner, physician assistant and nurses provide an ongoing outpatient care and treatment plan that guides heart failure and cardiomyopathy patients from evaluation and education to stabilization. This team works with your current primary care doctor and cardiologist to help you:      Avoid hospitalizations    Slow the progression of the disease    Improve length and quality of life    Know who and when to call if heart failure symptoms appear    Receive easy access to quality health care and advice    Better understand your condition and treatment    Decrease the tremendous cost burden of heart failure care    Detect future heart problems before they become life threatening    Your C.O.R.E. Clinic Team will continue to educate you on your heart failure and may adjust medications based on your vital signs, lab work, and how you are feeling.  Therefore, it is very important to bring the following to all C.O.R.E. appointments:    - An accurate list of your  medications  - Your medicine bottles  - Your weight chart/calendar    [facility] ([place]):    [phone]  [facility] ([place]):    [phone]  [facility] ([place]):  [phone]        Referrals     Occupational Therapy Adult Consult       Evaluate and treat as clinically indicated.    Reason:  Weakness/deconditioning       Physical Therapy Adult Consult       Evaluate and treat as clinically indicated.    Reason:  weakness/deconditioning             Follow-Up Appointment Instructions     Follow Up and recommended labs and tests       Follow up with halfway physician.  The following labs/tests are recommended: CBC, BMP next week. INR daily or per routine for warfarin dosing (goal INR 2-3 for atrial fibrillation).  Follow up with primary care provider after TCU discharge.  Surgical follow up per Vascular Surgery.-- Follow up with Dr. Vick in 2-4 weeks for suture removal and evaluation of stump             Statement of Approval     Ordered          10/16/18 1249  I have reviewed and agree with all the recommendations and orders detailed in this document.  EFFECTIVE NOW     Approved and electronically signed by:  Abdiel Alejandro MD                                                 INTERAGENCY TRANSFER FORM - NURSING   9/30/2018                       [place] [address] SURGICAL SPECIALITIES: [phone]            Attending Provider: Mikael Kendall MD     Allergies:  Blood Transfusion Related (Informational Only)    Infection:  None   Service:  HOSPITALIST    Ht:  1.829 m (6')   Wt:  79.3 kg (174 lb 13.2 oz)   Admission Wt:  80.5 kg (177 lb 8 oz)    BMI:  23.71 kg/m 2   BSA:  2.01 m 2            Advance Directives        Scanned docmt in ACP Activity?           No scanned doc        Immunizations     Name Date      HepA-Adult 11/03/08     Influenza (High Dose) 3 valent vaccine 09/24/18     Influenza (High Dose) 3 valent vaccine  11/20/17     Influenza (High Dose) 3 valent vaccine 11/29/16     Influenza Vaccine IM 3yrs+ 4 Valent IIV4 11/15/15     Pneumo Conj 13-V (2010&after) 12/21/15     Pneumococcal 23 valent 12/05/11     TDAP Vaccine (Boostrix) 12/22/14     Td (Adult), Adsorbed 07/25/03     Zoster vaccine, live 12/05/11       ASSESSMENT     Discharge Profile Flowsheet     EXPECTED DISCHARGE     Inspection of bony prominences  Full 10/16/18 0951    Expected Discharge Date  10/16/18 ([facility]) 10/12/18 1523   Inspection under devices  Full 10/16/18 0951    DISCHARGE NEEDS ASSESSMENT     Skin WDL  ex 10/16/18 0951    Equipment Currently Used at Home  walker, standard 10/15/18 0845   Skin Temperature  warm 10/16/18 0951    Transportation Available  car;family or friend will provide 10/15/18 0845   Skin Moisture  dry 10/16/18 0951    Equipment Used at Home  none 01/11/14 1359   Skin Elasticity  quick return to original state 10/16/18 0951    GASTROINTESTINAL (ADULT,PEDIATRIC,OB)     Skin Integrity  bruise(s);incision(s) 10/16/18 0951    GI WDL  WDL 10/16/18 0951   Additional Documentation  Pressure Ulcer (LDA) 10/16/18 0246    All Quadrants Bowel Sounds  audible and active in all quadrants 10/16/18 0951   Full except areas not inspected   -- (mepilex on coccyx) 10/16/18 0246    Last Bowel Movement  10/16/18 10/16/18 0951   Procedural focused assessment (identify areas inspected)   Spine;Hip, left;Hip, right;Buttock, left;Buttock, right;Sacrum;Coccyx;Heel, left;Foot, left 10/12/18 0256    GI Signs/Symptoms  constipation 10/16/18 0246   Not Inspected under devices  -- (dressings) 10/14/18 1958    Passing flatus  yes 10/16/18 0951   SAFETY      COMMUNICATION ASSESSMENT     Safety WDL  WDL 10/16/18 0951    Patient's communication style  spoken language (English or Bilingual) 09/22/18 0513   All Alarms  alarm(s) activated and audible 10/16/18 0951    SKIN     Safety Factors  ID band on 10/16/18 0951                 Assessment WDL (Within Defined Limits)  "Definitions           Safety WDL     Effective: 09/28/15    Row Information: <b>WDL Definition:</b> Bed in low position, wheels locked; call light in reach; upper side rails up x 2; ID band on<br> <font color=\"gray\"><i>Item=AS safety wdl>>List=AS safety wdl>>Version=F14</i></font>      Skin WDL     Effective: 09/28/15    Row Information: <b>WDL Definition:</b> Warm; dry; intact; elastic; without discoloration; pressure points without redness<br> <font color=\"gray\"><i>Item=AS skin wdl>>List=AS skin wdl>>Version=F14</i></font>      Vitals     Vital Signs Flowsheet     VITAL SIGNS     Height Method  Stated 09/30/18 2206    Temp  97.9  F (36.6  C) 10/16/18 1100   Weight  79.3 kg (174 lb 13.2 oz) 10/16/18 0546    Temp src  Oral 10/16/18 1100   Weight Method  Bed scale 10/16/18 0527    Resp  16 10/16/18 1214   Bed Scale  -- (2 pillows) 10/16/18 0527    Pulse  65 10/16/18 1100   BSA (Calculated - sq m)  2.02 09/30/18 2206    Heart Rate  66 10/16/18 0038   BMI (Calculated)  24.12 09/30/18 2206    Pulse/Heart Rate Source  Monitor 10/16/18 0038   POSITIONING      BP  136/64 10/16/18 1100   Body Position  independently positioning 10/16/18 0951    BP Location  Right arm 10/16/18 0038   Head of Bed (HOB)  HOB at 20 degrees 10/16/18 0527    Patient Position  Lying 10/11/18 1413   Positioning/Transfer Devices  pillows;in use 10/16/18 0527    OXYGEN THERAPY     Chair  Upright in chair 10/16/18 0951    SpO2  98 % 10/16/18 1100   DAILY CARE      O2 Device  None (Room air) 10/16/18 1100   Activity Management  activity adjusted per tolerance 10/16/18 0951    Oxygen Delivery  2 LPM 10/12/18 0638   Activity Assistance Provided  assistance, 1 person 10/16/18 0951    PAIN/COMFORT     Assistive Device Utilized  gait belt 10/15/18 2018    Patient Currently in Pain  yes 10/16/18 1205   CLINICALLY ALIGNED PAIN ASSESSMENT (CAPA) ([facility], [facility], [place], operated by [facility] AND [facility] ADULTS ONLY)      Preferred Pain Scale  number (Numeric Rating Pain Scale) 10/16/18 1205  "  Comfort  comfortably manageable 10/05/18 2348    Patient's Stated Pain Goal  3 10/16/18 1205   Change in Pain  getting better 10/05/18 2348    0-10 Pain Scale  6 10/16/18 1205   YOGI COMA SCALE      Pain Location  Leg 10/16/18 1205   Best Eye Response  4-->(E4) spontaneous 10/13/18 1431    Pain Orientation  Right 10/16/18 1205   Best Motor Response  6-->(M6) obeys commands 10/13/18 1431    Pain Descriptors  Aching 10/16/18 1205   Best Verbal Response  5-->(V5) oriented 10/13/18 1431    Pain Management Interventions  pillow support provided 10/15/18 1713   Yogi Coma Scale Score  15 10/13/18 1431    Pain Intervention(s)  Repositioned 10/16/18 1205   ECG      Response to Interventions  Decrease in pain 10/16/18 1205   ECG Rhythm  Sinus rhythm (BBB) 10/16/18 1214    ANALGESIA SIDE EFFECTS MONITORING     Ectopy  None 10/11/18 1420    Side Effects Monitoring: Respiratory Quality  R 10/16/18 1205   Equipment  electrodes changed;telemetry batteries changed 10/16/18 0910    Side Effects Monitoring: Respiratory Depth  N 10/16/18 1205   Lead Monitored  Lead II 10/11/18 1420    Side Effects Monitoring: Sedation Level  1 10/16/18 1205   RESPIRATORY MONITORING      HEIGHT AND WEIGHT     Respiratory Monitoring (EtCO2)  41 mmHg 10/11/18 1736    Height  1.829 m (6') 09/30/18 2206   Integrated Pulmonary Index (IPI)  10 10/11/18 1736            Patient Lines/Drains/Airways Status    Active LINES/DRAINS/AIRWAYS     Name: Placement date: Placement time: Site: Days: Last dressing change:    Closed/Suction Drain 1 Back 10 Cam[place]onian 02/12/17   1105   Back   611     Peripheral IV 10/08/18 Left Hand 10/08/18   1355   Hand   7     Peripheral IV 10/11/18 Left Lower forearm 10/11/18   1250   Lower forearm   5     Pressure Injury 09/30/18 Left Buttocks 09/30/18   2214    15     Wound 02/09/17 Posterior Coccyx Suspected pressure ulcer nonblanchable redness 02/09/17   0100   Coccyx   614     Wound 02/15/17 Mid Coccyx Suspected pressure ulcer  non blanchable open area 02/15/17   2017   Coccyx   607     Wound 02/19/17 Right Buttocks open area on right buttock 02/19/17   1430   Buttocks   603     Wound 03/08/17 Lateral;Right Foot 2 scabbed areas on plantar area on smallest toe and below that. 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 03/08/17   0859   Foot   587     Wound 10/12/18 Left Knee small dry scab 10/12/18   1653   Knee   3     Rash 03/14/17 2000 Bilateral anterior groin other (see comments) 03/14/17 2000    580     Incision/Surgical Site 02/01/16 Neck 02/01/16   1333    987     Incision/Surgical Site 02/01/16 Right Chest 02/01/16   1350    987     Incision/Surgical Site 02/09/17 Midline;Mid Back 02/09/17   0341    614     Incision/Surgical Site 02/09/17 Bilateral Back 02/09/17   0354    614     Incision/Surgical Site 02/12/17 Mid Back 02/12/17   1125    611     Incision/Surgical Site 02/15/17 Mid Back 02/15/17   2016    607     Incision/Surgical Site 10/11/18 Right Leg 10/11/18   1317    5             Patient Lines/Drains/Airways Status    Active PICC/CVC     None            Intake/Output Detail Report     Date Intake         Output     Net    Shift P.O. I.V. IV Piggyback Colloid Blood Components Total Urine Drains Blood Total       Noc 10/14/18 2300 - 10/15/18 0659 120 80 -- -- -- 200 250 5 -- 255 -55    Day 10/15/18 0700 - 10/15/18 1459 -- -- -- -- -- -- 500 -- -- 500 -500    Ashwini 10/15/18 1500 - 10/15/18 2259 970 -- -- -- -- 970 475 -- -- 475 495    Noc 10/15/18 2300 - 10/16/18 0659 100 3 -- -- -- 103 325 -- -- 325 -222    Day 10/16/18 0700 - 10/16/18 1459 240 -- -- -- -- 240 375 -- -- 375 -135      Last Void/BM       Most Recent Value    Urine Occurrence 1 at 10/16/2018 0546    Stool Occurrence 1 at 10/16/2018 0546      Case Management/Discharge Planning     Case Management/Discharge Planning Flowsheet     REFERRAL INFORMATION     DISCHARGE PLANNING      Arrived From  admitted as an inpatient 02/17/17 6041   Transportation Available  car;family or  friend will provide 10/15/18 0845    CTS Assigned to Case  [facility] 10/02/18 1007   Equipment Used at Home  none 01/11/14 1359    LIVING ENVIRONMENT     FINAL RESOURCES      Lives With  significant other (lives with roommate Patrice ) 10/15/18 0845   Equipment Currently Used at Home  walker, standard 10/15/18 0845    Living Arrangements  apartment 10/15/18 0845   ABUSE RISK SCREEN      COPING/STRESS     QUESTION TO PATIENT:  Has a member of your family or a partner(now or in the past) intimidated, hurt, manipulated, or controlled you in any way?  no 09/30/18 2217    Major Change/Loss/Stressor  loss of independence 09/30/18 2217   QUESTION TO PATIENT: Do you feel safe going back to the place where you are living?  yes 09/30/18 2217    Patient Personal Strengths  assertive;humor;motivated;positive attitude;strong support system 01/13/14 1435   OBSERVATION: Is there reason to believe there has been maltreatment of a vulnerable adult (ie. Physical/Sexual/Emotional abuse, self neglect, lack of adequate food, shelter, medical care, or financial exploitation)?  no 09/30/18 2217    EXPECTED DISCHARGE     OTHER      Expected Discharge Date  10/16/18 ([facility]) 10/12/18 1523   Are you depressed or being treated for depression?  No 09/30/18 2217                  [place] [id number] [facility]: [phone]            Medication Administration Report for Tad Manning as of 10/16/18 1334   Legend:    Given Hold Not Given Due Canceled Entry Other Actions    Time Time (Time) Time  Time-Action       Inactive    Active    Linked        Medications 10/10/18 10/11/18 10/12/18 10/13/18 10/14/18 10/15/18 10/16/18    acetaminophen (TYLENOL) tablet 650 mg  Dose: 650 mg  Freq: EVERY 4 HOURS PRN Route: PO  PRN Reason: mild pain  Start: 09/30/18 2203   Admin Instructions: Alternate ibuprofen (if ordered) with acetaminophen.  Maximum acetaminophen dose from all sources = 75 mg/kg/day not to exceed 4 grams/day.    Admin.  Amount: 2 tablet (2 × 325 mg tablet)  Last Admin: 10/11/18 1957  Dispense Loc: [address]      0941-Auto Hold       1552-Unhold       1957 (650 mg)-Given                atorvastatin (LIPITOR) tablet 20 mg  Dose: 20 mg  Freq: DAILY Route: PO  Start: 10/01/18 0900   Admin. Amount: 1 tablet (1 × 20 mg tablet)  Last Admin: 10/16/18 0843  Dispense Loc: [address]     0832 (20 mg)-Given        (0840)-Not Given       0941-Auto Hold       1552-Unhold        0845 (20 mg)-Given        0912 (20 mg)-Given        0823 (20 mg)-Given        0810 (20 mg)-Given        0843 (20 mg)-Given           benzocaine-menthol (CHLORASEPTIC) 6-10 MG lozenge 1 lozenge  Dose: 1 lozenge  Freq: EVERY 1 HOUR PRN Route: BU  PRN Reason: sore throat  PRN Comment: dry/sore throat without fever  Start: 10/10/18 0132   Admin. Amount: 1 lozenge  Last Admin: 10/14/18 0648  Dispense Loc: [address]      0942-Auto Hold       1552-Unhold          0648 (1 lozenge)-Given             bisacodyl (DULCOLAX) Suppository 10 mg  Dose: 10 mg  Freq: DAILY PRN Route: RE  PRN Reason: constipation  Start: 10/03/18 1118   Admin. Amount: 1 suppository (1 × 10 mg suppository)  Last Admin: 10/03/18 1333  Dispense Loc: [address]      0942-Auto Hold       1552-Unhold                cholecalciferol (vitamin D3) tablet 1,000 Units  Dose: 1,000 Units  Freq: DAILY Route: PO  Start: 10/01/18 0900   Admin. Amount: 1 tablet (1 × 1,000 Units tablet)  Last Admin: 10/16/18 0843  Dispense Loc: [address]     0832 (1,000 Units)-Given        (0840)-Not Given       0941-Auto Hold       1552-Unhold        0846 (1,000 Units)-Given        0913 (1,000 Units)-Given        0823 (1,000 Units)-Given        0810 (1,000 Units)-Given        0843 (1,000 Units)-Given           citalopram (celeXA) tablet 10 mg  Dose: 10 mg  Freq: DAILY Route: PO  Start: 10/01/18 0900   Admin. Amount: 1 tablet (1 × 10 mg tablet)  Last Admin: 10/16/18 0843  Dispense Loc:  ADS 33A     0832 (10 mg)-Given        (0841)-Not  Given       0941-Auto Hold       1552-Unhold        0846 (10 mg)-Given        0912 (10 mg)-Given        0823 (10 mg)-Given        0810 (10 mg)-Given        0843 (10 mg)-Given           clopidogrel (PLAVIX) tablet 75 mg  Dose: 75 mg  Freq: DAILY Route: PO  Start: 10/06/18 1615   Admin. Amount: 1 tablet (1 × 75 mg tablet)  Last Admin: 10/16/18 0843  Dispense Loc:  ADS 33A     0832 (75 mg)-Given        (0841)-Not Given       0942-Auto Hold       1552-Unhold        0845 (75 mg)-Given        0912 (75 mg)-Given        0823 (75 mg)-Given        0810 (75 mg)-Given        0843 (75 mg)-Given           ferrous sulfate (IRON) tablet 325 mg  Dose: 325 mg  Freq: DAILY Route: PO  Start: 10/06/18 0900   Admin Instructions: Absorbed best on an empty stomach. If stomach upset occurs, can take with meals.    Admin. Amount: 1 tablet (1 × 325 mg tablet)  Last Admin: 10/16/18 0843  Dispense Loc:  ADS 33A     0832 (325 mg)-Given        (0841)-Not Given       0942-Auto Hold       1552-Unhold        0845 (325 mg)-Given        0912 (325 mg)-Given        0823 (325 mg)-Given        0810 (325 mg)-Given        0843 (325 mg)-Given           furosemide (LASIX) tablet 40 mg  Dose: 40 mg  Freq: DAILY Route: PO  Start: 10/12/18 0800   Admin. Amount: 1 tablet (1 × 40 mg tablet)  Last Admin: 10/16/18 0843  Dispense Loc: [facility] 33A       0846 (40 mg)-Given        0912 (40 mg)-Given        0823 (40 mg)-Given        0810 (40 mg)-Given        0843 (40 mg)-Given           glucose gel 15-30 g  Dose: 15-30 g  Freq: EVERY 15 MIN PRN Route: PO  PRN Reason: low blood sugar  Start: 09/30/18 2232   Admin Instructions: Give 15 g for BG 51 to 69 mg/dL IF patient is conscious and able to swallow. Give 30 g for BG less than or equal to 50 mg/dL IF patient is conscious and able to swallow. Do NOT give glucose gel via enteral tube.  IF patient has enteral tube: give apple juice 120 mL (4 oz or 15 g of CHO) via enteral tube for BG 51 to 69 mg/dL.  Give apple juice  240 mL (8 oz or 30 g of CHO) via enteral tube for BG less than or equal to 50 mg/dL.    ~Oral gel is preferable for conscious and able to swallow patient.   ~IF gel unavailable or patient refuses may provide apple juice 120 mL (4 oz or 15 g of CHO). Document juice on I and O flowsheet.    Admin. Amount: 15-30 g  Dispense Loc:  ADS 33A  Volume: 93.75 mL      0941-Auto Hold       1552-Unhold               Or  dextrose 50 % injection 25-50 mL  Dose: 25-50 mL  Freq: EVERY 15 MIN PRN Route: IV  PRN Reason: low blood sugar  Start: 09/30/18 2232   Admin Instructions: Use if have IV access, BG less than 70 mg/dL and meet dose criteria below:  Dose if conscious and alert (or disorientated) and NPO = 25 mL  Dose if unconscious / not alert = 50 mL  Vesicant. For ordered doses up to 25 g, give IV Push undiluted. Give each 5g over 1 minute.    Admin. Amount: 25-50 mL  Dispense Loc:  ADS 33A  Infused Over: 1-5 Minutes  Volume: 50 mL      0941-Auto Hold       1552-Unhold               Or  glucagon injection 1 mg  Dose: 1 mg  Freq: EVERY 15 MIN PRN Route: SC  PRN Reason: low blood sugar  PRN Comment: May repeat x 1 only  Start: 09/30/18 2232   Admin Instructions: May give SQ or IM. ONLY use glucagon IF patient has NO IV access AND is UNABLE to swallow AND blood glucose is LESS than or EQUAL to 50 mg/dL.  If ordered IV, give IV Push over 1 minute. Reconstitute with 1mL sterile water.    Admin. Amount: 1 mg  Dispense Loc:  ADS 33A      0941-Auto Hold       1552-Unhold                HYDROmorphone (PF) (DILAUDID) injection 0.2-0.4 mg  Dose: 0.2-0.4 mg  Freq: EVERY 4 HOURS PRN Route: IV  PRN Reason: moderate to severe pain  Start: 10/08/18 1722   Admin Instructions: For ordered IV doses 0.1-4 mg give IV Push undiluted. Administer each 2mg over 2-5 minutes.    Admin. Amount: 0.2-0.4 mg  Last Admin: 10/11/18 1627  Dispense Loc:  ADS 33A   Current Line: Peripheral IV 10/11/18 Left Lower forearm     0942-Auto Hold       (9358)-Not  Given       (1428)-Not Given       1552-Unhold       1627 (0.4 mg)-Given                ibuprofen (ADVIL/MOTRIN) tablet 600 mg  Dose: 600 mg  Freq: EVERY 8 HOURS PRN Route: PO  PRN Reason: moderate pain  Start: 10/11/18 2351   Admin. Amount: 1 tablet (1 × 600 mg tablet)  Last Admin: 10/15/18 1607  Dispense Loc:  ADS 33A       0220 (600 mg)-Given          1607 (600 mg)-Given            insulin aspart (NovoLOG) inj (RAPID ACTING)  Dose: 10 Units  Freq: 3 TIMES DAILY WITH MEALS Route: SC  Start: 10/09/18 1800   Admin Instructions: If given at mealtime, administer within 30 minutes of start of meal    Admin. Amount: 10 Units  Last Admin: 10/16/18 0930  Dispense Loc: Contact Rx for dose  Volume: 3 mL     0839 (10 Units)-Given       1236 (10 Units)-Given       1808 (10 Units)-Given        (0841)-Not Given [C]       0942-Auto Hold       1200 Auto Hold-Automatically Held       1552-Unhold       (1755)-Not Given [C]        0923 (10 Units)-Given       (1347)-Not Given       1848 (10 Units)-Given        0918 (10 Units)-Given       1443 (10 Units)-Given       1830 (10 Units)-Given        0906 (10 Units)-Given       1334 (10 Units)-Given       1816 (10 Units)-Given        0905 (10 Units)-Given       1256 (10 Units)-Given       1753 (10 Units)-Given        0930 (10 Units)-Given       [ ] 1200       [ ] 1800           insulin aspart (NovoLOG) inj (RAPID ACTING)  Dose: 1-5 Units  Freq: AT BEDTIME Route: SC  Start: 09/30/18 2245   Admin Instructions: MEDIUM INSULIN RESISTANCE DOSING    Do Not give Bedtime Correction Insulin if BG less than  200.   For  - 249 give 1 units.   For  - 299 give 2 units.   For  - 349 give 3 units.   For  -399 give 4 units.   For BG greater than or equal to 400 give 5 units.  Notify provider if glucose greater than or equal to 350 mg/dL after administration of correction dose.  If given at mealtime, administer within 30 minutes of start of meal    Admin. Amount: 1-5 Units  Last  Admin: 10/08/18 2303  Dispense Loc: Contact Rx for dose  Volume: 3 mL     (2108)-Not Given        0942-Auto Hold       1552-Unhold       (2132)-Not Given        (2109)-Not Given        (2145)-Not Given        (2142)-Not Given [C]         (0101)-Not Given [C]       [ ] 2200           insulin aspart (NovoLOG) inj (RAPID ACTING)  Dose: 1-7 Units  Freq: 3 TIMES DAILY BEFORE MEALS Route: SC  Start: 10/01/18 0730   Admin Instructions: Correction Scale - MEDIUM INSULIN RESISTANCE DOSING     Do Not give Correction Insulin if Pre-Meal BG less than 140.   For Pre-Meal  - 189 give 1 unit.   For Pre-Meal  - 239 give 2 units.   For Pre-Meal  - 289 give 3 units.   For Pre-Meal  - 339 give 4 units.   For Pre-Meal - 399 give 5 units.   For Pre-Meal -449 give 6 units  For Pre-Meal BG greater than or equal to 450 give 7 units.   To be given with prandial insulin, and based on pre-meal blood glucose.    Notify provider if glucose greater than or equal to 350 mg/dL after administration of correction dose.  If given at mealtime, administer within 30 minutes of start of meal    Admin. Amount: 1-7 Units  Last Admin: 10/16/18 0930  Dispense Loc: Contact Rx for dose  Volume: 3 mL     0839 (1 Units)-Given       (1141)-Not Given [C]       1808 (1 Units)-Given        (0839)-Not Given [C]       0941-Auto Hold       1200 Auto Hold-Automatically Held       1552-Unhold       1752 (2 Units)-Given        (0919)-Not Given [C]       (1347)-Not Given       (1756)-Not Given        0919 (1 Units)-Given       1444 (1 Units)-Given       (1755)-Not Given        0906 (1 Units)-Given       (1312)-Not Given       1816 (1 Units)-Given        0905 (1 Units)-Given       (1238)-Not Given       (1757)-Not Given [C]        0930 (1 Units)-Given [C]       (1216)-Not Given [C]       [ ] 1700           insulin glargine (LANTUS) injection 16 Units  Dose: 16 Units  Freq: AT BEDTIME Route: SC  Start: 10/09/18 2200   Admin. Amount: 16  Units  Last Admin: 10/15/18 2141  Dispense Loc:  Main Pharmacy  Volume: 0.16 mL     (2200)-Not Given        0942-Auto Hold       1552-Unhold       2132 (16 Units)-Given        2125 (16 Units)-Given        2236 (16 Units)-Given        2155 (16 Units)-Given        2141 (16 Units)-Given        [ ] 2200           melatonin tablet 5 mg  Dose: 5 mg  Freq: AT BEDTIME Route: PO  Start: 09/30/18 2300   Admin. Amount: 1 tablet (1 × 5 mg tablet)  Last Admin: 10/15/18 2141  Dispense Loc:  ADS 33A     2200 (5 mg)-Given        0941-Auto Hold       1552-Unhold       2134 (5 mg)-Given        2124 (5 mg)-Given        2236 (5 mg)-Given        2155 (5 mg)-Given        2141 (5 mg)-Given        [ ] 2200           metFORMIN (GLUCOPHAGE) tablet 500 mg  Dose: 500 mg  Freq: 2 TIMES DAILY WITH MEALS Route: PO  Start: 10/13/18 1800   Admin Instructions: If the patient receives intravenous, iodinated contrast and patient GFR is greater than 60 mL/min/1.7m2, continue metformin.  Contact provider for 'hold' or 'no hold' instructions if no GFR or if GFR is less than 60 mL/min/1.7m2.    Admin. Amount: 1 tablet (1 × 500 mg tablet)  Last Admin: 10/16/18 0843  Dispense Loc:  ADS 33A        1815 (500 mg)-Given        0823 (500 mg)-Given       1815 (500 mg)-Given        0810 (500 mg)-Given       1753 (500 mg)-Given        0843 (500 mg)-Given       [ ] 1800           metoprolol succinate (TOPROL-XL) 24 hr tablet 100 mg  Dose: 100 mg  Freq: DAILY Route: PO  Start: 09/30/18 2330   Admin Instructions: DO NOT CRUSH. Tablet may be split in half along score line.    Admin. Amount: 1 tablet (1 × 100 mg tablet)  Last Admin: 10/15/18 2043  Dispense Loc:  ADS 33A     2014 (100 mg)-Given        0942-Auto Hold       1552-Unhold       2109 (100 mg)-Given        2124 (100 mg)-Given        2019 (100 mg)-Given        2155 (100 mg)-Given        2043 (100 mg)-Given        [ ] 2100           miconazole (MICATIN; MICRO GUARD) 2 % powder  Freq: 3 TIMES DAILY Route:  Top  Start: 10/01/18 1715   Admin Instructions: Apply to affected bilateral groin and buttocks TID and prn    Last Admin: 10/16/18 0847  Dispense Loc:  Main Pharmacy     0833 ( )-Given       1709 ( )-Given       2201 ( )-Given               0942-Auto Hold       1552-Unhold              2119 ( )-Given        0926 ( )-Given       1647 ( )-Given       2126 ( )-Given        0921 ( )-Given       (1829)-Not Given       2237 ( )-Given        0825 ( )-Given       (1845)-Not Given       2157 ( )-Given        0906 ( )-Given       1649 ( )-Given [C]        (0044)-Not Given [C]       0847 ( )-Given       [ ] 1600       [ ] 2200           naloxone (NARCAN) injection 0.1-0.4 mg  Dose: 0.1-0.4 mg  Freq: EVERY 2 MIN PRN Route: IV  PRN Reason: opioid reversal  Start: 10/11/18 2342   Admin Instructions: For respiratory rate LESS than or EQUAL to 8.  Partial reversal dose:  0.1 mg titrated q 2 minutes for Analgesia Side Effects Monitoring Sedation Level of 3 (frequently drowsy, arousable, drifts to sleep during conversation).Full reversal dose:  0.4 mg bolus for Analgesia Side Effects Monitoring Sedation Level of 4 (somnolent, minimal or no response to stimulation).  For ordered IV doses 0.1-2mg give IVP. Give each 0.4mg over 15 seconds in emergency situations. For non-emergent situations further dilute in 9mL of NS to facilitate titration of response.    Admin. Amount: 0.1-0.4 mg = 0.25-1 mL Conc: 0.4 mg/mL  Dispense Loc:  ADS 33A  Volume: 1 mL               ondansetron (ZOFRAN-ODT) ODT tab 4 mg  Dose: 4 mg  Freq: EVERY 6 HOURS PRN Route: PO  PRN Reasons: nausea,vomiting  Start: 09/30/18 2203   Admin Instructions: This is Step 1 of nausea and vomiting management.  If nausea not resolved in 15 minutes, go to Step 2 prochlorperazine (COMPAZINE). Do not push through foil backing. Peel back foil and gently remove. Place on tongue immediately. Administration with liquid unnecessary  With dry hands, peel back foil backing and gently  remove tablet; do not push oral disintegrating tablet through foil backing; administer immediately on tongue and oral disintegrating tablet dissolves in seconds; then swallow with saliva; liquid not required.    Admin. Amount: 1 tablet (1 × 4 mg tablet)  Dispense Loc:  ADS 33A      0941-Auto Hold       1552-Unhold               Or  ondansetron (ZOFRAN) injection 4 mg  Dose: 4 mg  Freq: EVERY 6 HOURS PRN Route: IV  PRN Reasons: nausea,vomiting  Start: 09/30/18 2203   Admin Instructions: This is Step 1 of nausea and vomiting management.  If nausea not resolved in 15 minutes, go to Step 2 prochlorperazine (COMPAZINE).  Irritant. For ordered IV doses 0.1-4 mg, give IV Push undiluted over 2-5 minutes.    Admin. Amount: 4 mg = 2 mL Conc: 4 mg/2 mL  Dispense Loc:  ADS 33A  Infused Over: 2-5 Minutes  Volume: 2 mL      0941-Auto Hold       1552-Unhold                oxyCODONE IR (ROXICODONE) tablet 5-10 mg  Dose: 5-10 mg  Freq: EVERY 3 HOURS PRN Route: PO  PRN Reason: other  PRN Comment: pain control or improvement in physical function. Hold dose for analgesic side effects.  Start: 09/30/18 2203   Admin Instructions: Start with the lowest dose.  May adjust dose by 5 mg every 3 hours as needed. Notify provider to assess for uncontrolled pain or analgesic side effects. Hold while on PCA or with regular IV opioid dosing.    Admin. Amount: 1-2 tablet (1-2 × 5 mg tablet)  Last Admin: 10/16/18 1136  Dispense Loc:  ADS 33A     0136 (10 mg)-Given       0513 (5 mg)-Given       1454 (5 mg)-Given       2015 (5 mg)-Given        0142 (10 mg)-Given       0941-Auto Hold       1552-Unhold       1803 (10 mg)-Given       2108 (10 mg)-Given           1358 (5 mg)-Given        1103 (5 mg)-Given       1136 (5 mg)-Given           pantoprazole (PROTONIX) EC tablet 40 mg  Dose: 40 mg  Freq: 2 TIMES DAILY BEFORE MEALS Route: PO  Start: 10/04/18 1100   Admin Instructions: DO NOT CRUSH.    Admin. Amount: 1 tablet (1 × 40 mg tablet)  Last Admin:  10/16/18 0704  Dispense Loc: [facility] 33A     0700 (40 mg)-Given       1709 (40 mg)-Given        (0840)-Not Given       0942-Auto Hold       1552-Unhold       1637 (40 mg)-Given        0622 (40 mg)-Given       1651 (40 mg)-Given        0639 (40 mg)-Given       1815 (40 mg)-Given        0644 (40 mg)-Given       1815 (40 mg)-Given        0644 (40 mg)-Given       1712 (40 mg)-Given        0704 (40 mg)-Given       [ ] 1630           polyethylene glycol (MIRALAX/GLYCOLAX) Packet 17 g  Dose: 17 g  Freq: 2 TIMES DAILY Route: PO  Start: 10/12/18 2100   Admin Instructions: Change to PRN once bowels move.  1 Packet = 17 grams. Mixed prescribed dose in 8 ounces of water. Follow with 8 oz. of water.    Admin. Amount: 17 g  Last Admin: 10/14/18 0825  Dispense Loc: [facility] 33A       2125 (17 g)-Given        0927 (17 g)-Given       2020 (17 g)-Given        0825 (17 g)-Given       (2155)-Not Given        (0811)-Not Given       (2055)-Not Given        (0848)-Not Given       [ ] 2100           polyethylene glycol (MIRALAX/GLYCOLAX) Packet 17 g  Dose: 17 g  Freq: 2 TIMES DAILY PRN Route: PO  PRN Comment: constipation  Start: 10/09/18 0847   Admin Instructions: 1 Packet = 17 grams. Mixed prescribed dose in 8 ounces of water. Follow with 8 oz. of water.    Admin. Amount: 17 g  Dispense Loc: [address]      0942-Auto Hold       1552-Unhold                Reason ACE/ARB/ARNI order not selected  Freq: DOES NOT GO TO MAR Route: OTHER  PRN Reason: other  Start: 10/16/18 1248   Order specific questions:  Reason not prescribed: recent rise in serum creatinine     Dispense Loc:  Main Pharmacy  POC: Discharge-NON Med               senna-docusate (SENOKOT-S;PERICOLACE) 8.6-50 MG per tablet 1 tablet  Dose: 1 tablet  Freq: 2 TIMES DAILY PRN Route: PO  PRN Reason: constipation  Start: 09/30/18 2203   Admin Instructions: If no bowel movement in 24 hours, increase to 2 tablets PO.  Hold for loose stools.  This is the first step of a three step  constipation treatment.    Admin. Amount: 1 tablet  Last Admin: 10/03/18 0930  Dispense Loc: [facility] 33A      0941-Auto Hold       1552-Unhold               Or  senna-docusate (SENOKOT-S;PERICOLACE) 8.6-50 MG per tablet 2 tablet  Dose: 2 tablet  Freq: 2 TIMES DAILY PRN Route: PO  PRN Reason: constipation  Start: 09/30/18 2203   Admin Instructions: Hold for loose stools.  This is the first step of a three step constipation treatment.    Admin. Amount: 2 tablet  Last Admin: 10/07/18 1727  Dispense Loc: [facility] 33      0941-Auto Hold       1552-Unhold                sennosides (SENOKOT) tablet 2 tablet  Dose: 2 tablet  Freq: 2 TIMES DAILY Route: PO  Start: 10/09/18 0900   Admin. Amount: 2 tablet  Last Admin: 10/14/18 0823  Dispense Loc: [address]     0832 (2 tablet)-Given       (2057)-Not Given        (0842)-Not Given       0942-Auto Hold       1552-Unhold       2109 (1 tablet)-Given [C]        0846 (2 tablet)-Given       2124 (2 tablet)-Given        0912 (2 tablet)-Given       2019 (2 tablet)-Given        0823 (2 tablet)-Given       (2155)-Not Given        (0811)-Not Given       (2056)-Not Given        (0848)-Not Given       [ ] 2100           sodium chloride (PF) 0.9% PF flush 3 mL  Dose: 3 mL  Freq: EVERY 8 HOURS Route: IK  Start: 10/06/18 0300   Admin Instructions: And Q1H PRN, to lock peripheral IV dormant line.    Admin. Amount: 3 mL  Last Admin: 10/16/18 0531  Dispense Loc: [facility] Stock  Volume: 3 mL   Current Line: Peripheral IV 10/11/18 Left Lower forearm    0136 (3 mL)-Given              1133 (3 mL)-Given       1810 (3 mL)-Given               0942-Auto Hold       1100 Auto Hold-Automatically Held       1552-Unhold       2009 (3 mL)-Given        0220 (3 mL)-Given       1332 (3 mL)-Given       1850 (3 mL)-Given        0417 (3 mL)-Given       (1549)-Not Given [C]       (2025)-Not Given        (0423)-Not Given       (1312)-Not Given       1935 (3 mL)-Given        0553 (3 mL)-Given       0800 (3 mL)-Given         (3 mL)-Given [C]        0531 (3 mL)-Given       [ ] 1200       [ ] 2000           sodium chloride (PF) 0.9% PF flush 3 mL  Dose: 3 mL  Freq: EVERY 1 HOUR PRN Route: IK  PRN Reason: line flush  Start: 10/06/18 0257   Admin Instructions: for peripheral IV flush post IV meds    Admin. Amount: 3 mL  Last Admin: 10/12/18 0306  Dispense Loc: [facility] Stock  Volume: 3 mL      0942-Auto Hold       1552-Unhold        0306 (3 mL)-Given               sodium chloride 0.9% infusion  Rate: 10 mL/hr   Freq: CONTINUOUS Route: IV  Last Dose: Stopped (10/15/18 0813)  Start: 10/11/18 2345   Admin Instructions: Carrier/maintenance fluid for PCA if ordered. MUST use carrier/maintenance fluid to deliver PCA infusion.  If PCA not ordered or discontinued may discontinue this order.    Last Admin: 10/14/18 06  Dispense Loc: [facility] Stock  Volume: 1,000 mL   Current Line: Peripheral IV 10/11/18 Left Lower forearm      0302 ( )-New Bag       0627 ( )-Rate/Dose Verify        0634 ( )-New Bag        0623 ( )-New Bag        0813-Stopped            Warfarin Therapy Reminder (Check START DATE - warfarin may be starting in the FUTURE)  Dose: 1 each  Freq: CONTINUOUS PRN Route: XX  Start: 10/14/18 1316   Admin Instructions: *Note to reorder warfarin daily*  Pharmacy Warfarin Dosing Service  Patient is on Warfarin Therapy - check for daily order    Admin. Amount: 1 each  Dispense Loc:  Main Pharmacy              Future Medications  Medications 10/10/18 10/11/18 10/12/18 10/13/18 10/14/18 10/15/18 10/16/18       warfarin (COUMADIN) tablet 10 mg  Dose: 10 mg  Freq: ONCE AT 6PM Route: PO  Start: 10/16/18 1800   Admin Instructions: INR = 1.14    Admin. Amount: 2 tablet (2 × 5 mg tablet)  Dispense Loc:  ADS 33A  Administrations Remainin           [ ] 1800          Completed Medications  Medications 10/10/18 10/11/18 10/12/18 10/13/18 10/14/18 10/15/18 10/16/18         Dose: 1 tablet  Freq: EVERY 12 HOURS SCHEDULED Route:  PO  Indications of Use: SKIN AND SOFT TISSUE INFECTION  Start: 10/12/18 2000   End: 10/15/18 0811   Admin. Amount: 1 tablet  Last Admin: 10/15/18 0811  Dispense Loc:  [facility] 33[facility]  Administrations Remainin       1925 (1 tablet)-Given        0912 (1 tablet)-Given       2019 (1 tablet)-Given        0823 (1 tablet)-Given       1935 (1 tablet)-Given        0811 (1 tablet)-Given              Dose: 7.5 mg  Freq: ONCE AT 6PM Route: PO  Start: 10/15/18 1800   End: 10/15/18 1712   Admin. Amount: 1 tablet (1 × 7.5 mg tablet)  Last Admin: 10/15/18 1712  Dispense Loc:  [facility] 33[facility]  Administrations Remainin          1712 (7.5 mg)-Given              Dose: 7.5 mg  Freq: ONCE AT 6PM Route: PO  Start: 10/14/18 1800   End: 10/14/18 1816   Admin. Amount: 1 tablet (1 × 7.5 mg tablet)  Last Admin: 10/14/18 1816  Dispense Loc:  ADS 33[facility]  Administrations Remainin         1816 (7.5 mg)-Given            Discontinued Medications  Medications 10/10/18 10/11/18 10/12/18 10/13/18 10/14/18 10/15/18 10/16/18         Nurse Loading Dose: 0.2 mg  PCA Dose: 0.1 mg  Lockout Interval: 10 Minutes  Continuous Rate: 0 mg/hr  One Hour Dose Limit: 1.2 mg    Freq: CONTINUOUS Route: IV  Last Dose: Stopped (10/15/18 0812)  Start: 10/11/18 2345   End: 10/15/18 0801   Admin Instructions: Start with the provider specified dose. PCA dose range is 0.1 mg - 0.2 mg. May increase dose by 0.1mg for pain control or improvement in physical function. Hold the dose for analgesic side effects. Notify the provider to assess for uncontrolled pain or analgesic side effects. Do NOT give any additional opioids while on PCA. When transitioning from PCA to oral opioid MAY give first oral dose 30 minutes PRIOR to discontinuation of PCA.    Order specific questions:  Initial Set-up verified by Mikayla Leal RN     Last Admin: 10/15/18 0589  Dispense Loc:  [facility] 33A  Volume: 30 mL   Current Line: Peripheral IV 10/11/18 Left Lower forearm      0257-New Syringe/Cartridge        0626-Shift Total       1547-Shift Total       2130-New Syringe/Cartridge        0541-Shift Total       1349-Shift Total       2237-Shift Total        0620-Shift Total       1429-Shift Total       2233-Shift Total        0554-Shift Total       0801-Med Discontinued  0812-Stopped              Dose: 1 each  Freq: CONTINUOUS PRN Route: XX  Start: 10/13/18 1657   End: 10/13/18 1715   Admin Instructions: *Note to reorder warfarin daily*  Pharmacy Warfarin Dosing Service  Patient is on Warfarin Therapy - check for daily order    Admin. Amount: 1 each        1715-Med Discontinued                   INTERAGENCY TRANSFER FORM - NOTES (H&P, Discharge Summary, Consults, Procedures, Therapies)   9/30/2018                       [place] [address] [facility]: [phone]               History & Physicals      H&P by Trip Ruby DO at 9/30/2018 10:22 PM     Author:  Trip Ruby DO Service:  Hospitalist Author Type:  Physician    Filed:  9/30/2018 10:45 PM Date of Service:  9/30/2018 10:22 PM Creation Time:  9/30/2018 10:22 PM    Status:  Addendum :  Trip Ruby DO (Physician)         [facility]    History and Physical  Hospitalist       Date of Admission:  9/30/2018    Assessment & Plan   Tad Manning is a 77 year old male with a history of osteomyelitis of left foot, PVD, CAD s/p 3 vessel CABG and recent BHAVNA x2 in March, DM type II w/polyneuropathy and atrial flutter on Coumadin for anticoagulation who presented with     Concern for osteomyelitis of right foot w/sepsis  H/o Osteomyelitis of left foot  Bilateral foot wounds   Patient presented to [facility] due to a non-healing right foot ulcer in the setting of prior osteomyelitis to the left foot.  At outside hospital ESR was 101 and CRP was 20.56 concerning for osteomyelitis.[JH1.1]  Patient was sent here from [facility] for further evaluation[JH1.2]   Patient also met 2/4 SIRS criteria with an  elevated WBC of 14.5 and tachycardia   - Admission to medical bed  - Blood cultures x2  - Lactic acid ordered  - IV antibiotics with Zosyn and Vancomycin for now   - Tylenol and Oxycodone PRN   - MRI right foot  - WOC nurse consulted  - Podiatry consulted    CAD s/p CABG  PVD  Atrial flutter   Patient actually underwent a LHC on 3/6/18 and had BHAVNA x2 placed to the LAD.  He is currently on Plavix and Coumadin due to his atrial flutter.  At this time no acute issues and patient has adequate rate control   - Currently holding Coumadin due to possible surgical evaluation and will bridge with Heparin   - Continue PTA Plavix given recent stenting   - PTA Toprol  mg   - PTA Lipitor 20 mg     DM type II w/polyneuropathy   PTA on Metformin 500 mg BID.  Last HgbA1c from 9/22/18 was 9.0.   - Medium intensity SSI   - Moderate CHO diet      CKD Stage III  Baseline Cr appears to be 1.4-1.7 and was 1.7 at [facility]  - Avoid nephrotoxins  - Continue to monitor     Mild hyponatremia   Patient's sodium was 126 at [facility] but suspect this may be more pseudohyponatremia as the patient's blood glucose was 395.  When corrected his sodium was only 131 which appears to be near his baseline of 133-134  - Repeat renal panel in the AM     Chronic anemia  Hgb was 8.6 and baseline appears to be 10.  Suspect this may be anemia of chronic disease  - Continue to monitor    - Iron studies ordered     DVT Prophylaxis: Heparin drip  Code Status: Full Code    Disposition: Expected discharge TBD.  Still requires surgical evaluation.     Trip Ruby DO    Primary Care Physician   Gene Guevara    Chief Complaint   Right foot wound    History is obtained from the patient    History of Present Illness   Tad Manning is a 77 year old male with a history of osteomyelitis of left foot, PVD, CAD s/p 3 vessel CABG and recent BHAVNA x2 in March, DM type II w/polyneuropathy who presented to [facility] a right foot wound.  Patient states he  first noticed the wound 6-8 weeks ago and came in to the ED today as it was not improving.  Patient has not noticed any swelling, erythema or pain to the area.   He also has not had any fevers, chills, chest pain, SOB or light headedness.  Denies any abdominal pain, N/V/D or problems with urination.      Past Medical History    I have reviewed this patient's medical history and updated it with pertinent information if needed.[JH1.1]   Past Medical History:   Diagnosis Date     Arthritis      ASCVD (arteriosclerotic cardiovascular disease)      BMI 30.0-30.9,adult      BPH (benign prostatic hypertrophy)      Cellulitis      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)      Coronary artery disease     triple bypass 1995     Diabetes mellitus (H)      Diabetic polyneuropathy (H)      History of blood transfusion      Hyperlipidaemia      Hypertension      Noninflammatory pericardial effusion      Osteomyelitis of left foot (H)      PVD (peripheral vascular disease) (H)      Sebaceous cyst[JH1.3]    Atrial flutter    Past Surgical History   I have reviewed this patient's surgical history and updated it with pertinent information if needed.[JH1.1]  Past Surgical History:   Procedure Laterality Date     AMPUTATE TOE(S)  1/10/2014    Procedure: AMPUTATE TOE(S);;  Surgeon: Amador Vick MD;  Location:  OR     BONE MARROW BIOPSY, BONE SPECIMEN, NEEDLE/TROCAR  10/19/2012    Procedure: BIOPSY BONE MARROW;  BONE MARROW BIOPSY  (CONSCIOUS SED);  Surgeon: Ramon Quesaad MD;  Location:  GI     BYPASS GRAFT FEMOROPOPLITEAL  1/10/2014    Procedure: BYPASS GRAFT FEMOROPOPLITEAL;  Left above knee popliteal to  Below knee popliteal bypass using transverse saphenous vein graft. Left 2nd Toe amputation;  Surgeon: Amador Vick MD;  Location:  OR     CARDIAC SURGERY      angioplasty with stent, triple bypass     EXCISE CYST GENERIC (LOCATION) N/A 2/1/2016    Procedure: EXCISE CYST  GENERIC (LOCATION);  Surgeon: Amador Vick MD;  Location:  [facility]     GRAFT VEIN FROM EXTREMITY (LOCATION)  1/10/2014    Procedure: GRAFT VEIN FROM EXTREMITY (LOCATION);;  Surgeon: Amador Vick MD;  Location: [facility] OR     LAMINECTOMY THORACIC ONE LEVEL N/A 2/8/2017    Procedure: LAMINECTOMY THORACIC ONE LEVEL;  Surgeon: Marcelo Trent MD;  Location: [facility] OR     OPTICAL TRACKING SYSTEM FUSION POSTERIOR SPINE THORACIC THREE+ LEVELS N/A 2/12/2017    Procedure: OPTICAL TRACKING SYSTEM FUSION POSTERIOR SPINE THORACIC THREE+ LEVELS;  Surgeon: Marcelo Trent MD;  Location: [facility] OR     TONSILLECTOMY       VASCULAR SURGERY      ptcr legs[JH1.3]       Prior to Admission Medications   Prior to Admission Medications   Prescriptions Last Dose Informant Patient Reported? Taking?   Citalopram Hydrobromide (CELEXA PO)   Yes No   Sig: Take 10 mg by mouth daily   MELATONIN PO   Yes No   Sig: Take 5 mg by mouth daily   acetaminophen (TYLENOL) 325 MG tablet   Yes No   Sig: Take 325 mg by mouth every 4 hours as needed for mild pain   atorvastatin (LIPITOR) 20 MG tablet   No No   Sig: TAKE 1 TABLET BY MOUTH EVERY DAY   cholecalciferol 1000 UNITS TABS   No No   Sig: Take 1,000 Units by mouth daily   clopidogrel (PLAVIX) 75 MG tablet   No No   Sig: Take 1 tablet (75 mg) by mouth daily   furosemide (LASIX) 20 MG tablet   No No   Sig: Take 2 tablets (40 mg) by mouth daily   metFORMIN (GLUCOPHAGE) 500 MG tablet   No No   Sig: Take 1 tablet (500 mg) by mouth 2 times daily (with meals)   metoprolol succinate (TOPROL-XL) 200 MG 24 hr tablet   No No   Sig: Take 0.5 tablets (100 mg) by mouth daily   nitroglycerin (NITROSTAT) 0.4 MG sublingual tablet   No No   Sig: For chest pain place 1 tablet under the tongue every 5 minutes for 3 doses. If symptoms persist 5 minutes after 1st dose call 911.   nystatin (MYCOSTATIN) cream   Yes No   Sig: Apply topically 2 times daily   order for DME  Self No No   Sig: Equipment being ordered: [facility]  shoe   ranitidine (ZANTAC) 150 MG tablet   No No   Sig: Take 1 tablet (150 mg) by mouth 2 times daily   sodium chloride (EQ SALINE NASAL SPRAY) 0.65 % nasal spray   No No   Sig: Spray 1 spray into both nostrils daily as needed for congestion   warfarin (COUMADIN) 5 MG tablet   No No   Sig: Take 0.5 tablets tonight 9/24 and 1 tab tomorrow night 9/25. INR check on Wednesday 9/26 and continue as directed by coumadin clinic.      Facility-Administered Medications: None     Allergies   Allergies   Allergen Reactions     Blood Transfusion Related (Informational Only) Other (See Comments)     Patient has a history of a clinically significant antibody against RBC antigens.  A delay in compatible RBCs may occur.        Social History   I have reviewed this patient's social history and updated it with pertinent information if needed. Tad Manning[JH1.1]  reports that he quit smoking about 23 years ago. His smoking use included Cigarettes. He has a 60.00 pack-year smoking history. He has never used smokeless tobacco. He reports that he does not drink alcohol or use illicit drugs.[JH1.3]    Family History   I have reviewed this patient's family history and updated it with pertinent information if needed.[JH1.1]   Family History   Problem Relation Age of Onset     Cancer Mother      leukemia[JH1.3]       Review of Systems   The 10 point Review of Systems is negative other than noted in the HPI    Physical Exam   Temp: 98.8  F (37.1  C) Temp src: Oral BP: 128/61 Pulse: 103   Resp: 18 SpO2: 99 % (98.9- computer changing to 99) O2 Device: None (Room air)    Vital Signs with Ranges  Temp:  [98.8  F (37.1  C)] 98.8  F (37.1  C)  Pulse:  [103] 103  Resp:  [18] 18  BP: (128)/(61) 128/61  SpO2:  [99 %] 99 %  177 lbs 8 oz    Constitutional: Awake, alert, cooperative, no apparent distress.  Eyes: Conjunctiva and pupils examined and normal.  HEENT: Moist mucous membranes, normal dentition.  Respiratory: Clear to auscultation  bilaterally, no crackles or wheezing.  Cardiovascular: Tachycardiac, normal S1 and S2, and no murmur noted.  GI: Soft, non-distended, non-tender, normal bowel sounds.  Lymph/Hematologic: No anterior cervical or supraclavicular adenopathy.  Skin: No rashes, no cyanosis, no edema.  Musculoskeletal: Right foot is wrapped.  No joint swelling, erythema or tenderness.  Neurologic: Cranial nerves 2-12 intact, normal strength and sensation.  Psychiatric: Alert, oriented to person, place and time, no obvious anxiety or depression.    Data   Data reviewed today:  I personally reviewed no images or EKG's today.    Recent Labs  Lab 09/27/18  1250 09/27/18  1001 09/24/18  0452   INR 1.40* 1.3 3.08*   *  --  134   POTASSIUM 5.0  --  4.5   CHLORIDE 95  --  103   CO2 22  --  23   BUN 47*  --  55*   CR 1.76*  --  1.49*   ANIONGAP 12  --  8   JUSTINO 9.4  --  9.1   *  --  215*       Imaging:  No results found for this or any previous visit (from the past 24 hour(s)).[JH1.1]      Revision History        User Key Date/Time User Provider Type Action    > [N/A] 9/30/2018 10:45 PM Trip Ruby DO Physician Addend     JH1.2 9/30/2018 10:44 PM Trip Ruby DO Physician Sign     JH1.3 9/30/2018 10:24 PM Trip Ruby,  Physician      JH1.1 9/30/2018 10:22 PM Trip Ruby DO Physician                      Discharge Summaries      Discharge Summaries by Abdiel Alejandro MD at 10/16/2018 12:14 PM     Author:  Abdiel Alejandro MD Service:  Hospitalist Author Type:  Physician    Filed:  10/16/2018 12:49 PM Date of Service:  10/16/2018 12:14 PM Creation Time:  10/15/2018  3:49 PM    Status:  Addendum :  Abdiel Alejandro MD (Physician)         [facility]  Discharge Summary        Tad Manning MRN# [id number]   YOB: 1940 Age: 77 year old     Date of Admission: 9/30/2018  Date of Discharge: 10/1[GC1.1]6[GC1.2]/2018  Admitting  Physician: Trip Randle Ruby, DO  Discharge Physician:[GC1.1] Abdiel Alejandro MD[GC1.2]     Primary Provider: Gene Guevara  Primary Care Physician Phone Number: [phone]         Discharge Diagnoses:   1. 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  2. Acute blood loss anemia on chronic anemia.  3. Ischemic cardiomyopathy with acute on chronic biventricular systolic CHF exacerbation.  4. Acute kidney injury, stage stage 2 on CKD stage 3.  5. Physical deconditioning with h/o mechanical falls due to acute illness/chronic debilitation.        Other Chronic Medical Problems:      1. Hypertension (benign essential).  2. DM2 with neuropathy and circulatory complications.  3. PAD. H/o lower extremity stents. H/o left lower extremity bypass 2014  4. Persistent atrial fibrillation.  5. 2nd degree AV Block.   6. BPH.  7. GERD.  8. Anxiety.       Allergies:[GC1.1]         Allergies   Allergen Reactions     Blood Transfusion Related (Informational Only) Other (See Comments)     Patient has a history of a clinically significant antibody against RBC antigens.  A delay in compatible RBCs may occur.[GC1.2]            Discharge Medications:[GC1.1]        Current Discharge Medication List      START taking these medications    Details   bisacodyl (DULCOLAX) 10 MG Suppository Place 1 suppository (10 mg) rectally daily as needed for constipation  Qty: 30 suppository    Associated Diagnoses: Slow transit constipation      ferrous sulfate (IRON) 325 (65 Fe) MG tablet Take 1 tablet (325 mg) by mouth daily  Qty: 100 tablet    Associated Diagnoses: Anemia, unspecified type      !! insulin aspart (NOVOLOG FLEXPEN) 100 UNIT/ML injection Novolog Flexpen  Give before meals and before bed:  For Pre-Meal Glucose:  140-189 give 1 unit   190-239 give 2 units   240-289 give 3 units   290-339 give 4  units   = or >340 give 5 units     For Bedtime Glucose  200 - 239 give 1 units   240 - 289 give 1.5 units  290 - 339 give 2 units  = or >340 give 2.5 units  Qty: 15 mL, Refills: 0    Associated Diagnoses: Type 2 diabetes mellitus with diabetic polyneuropathy, without long-term current use of insulin (H)      !! insulin aspart (NOVOLOG PEN) 100 UNIT/ML injection Inject 10 Units Subcutaneous 3 times daily (with meals)    Associated Diagnoses: Type 2 diabetes mellitus with diabetic polyneuropathy, without long-term current use of insulin (H)      insulin glargine (LANTUS SOLOSTAR) 100 UNIT/ML pen Inject 16 Units Subcutaneous At Bedtime    Associated Diagnoses: Type 2 diabetes mellitus with diabetic polyneuropathy, without long-term current use of insulin (H)      miconazole (MICATIN; MICRO GUARD) 2 % powder Apply topically 3 times daily    Associated Diagnoses: Skin disease, fungal      oxyCODONE IR (ROXICODONE) 5 MG tablet Take 1-2 tablets (5-10 mg) by mouth every 4 hours as needed for pain  Qty: 20 tablet, Refills: 0    Associated Diagnoses: Osteomyelitis of foot, right, acute (H)      pantoprazole (PROTONIX) 40 MG EC tablet Take 1 tablet (40 mg) by mouth daily  Qty: 30 tablet    Associated Diagnoses: Gastroesophageal reflux disease, esophagitis presence not specified      polyethylene glycol (MIRALAX/GLYCOLAX) Packet Take 17 g by mouth 2 times daily  Qty: 7 packet    Comments: Hold if loose stools/diarrhea.  Associated Diagnoses: Slow transit constipation      PROVIDER DOSED MANAGED WARFARIN, COUMADIN, OUTPATIENT Dose based on INR per Primary MD/Nursing Home MD.    Comments: INR goal 2-3 for afib.  Associated Diagnoses: Atrial fibrillation, unspecified type (H)      sennosides (SENOKOT) 8.6 MG tablet Take 2 tablets by mouth 2 times daily  Qty: 120 each    Comments: Hold if loose stools/diarrhea.  Associated Diagnoses: Slow transit constipation       !! - Potential duplicate medications found. Please discuss with  provider.      CONTINUE these medications which have NOT CHANGED    Details   acetaminophen (TYLENOL) 325 MG tablet Take 325 mg by mouth every 4 hours as needed for mild pain      atorvastatin (LIPITOR) 20 MG tablet TAKE 1 TABLET BY MOUTH EVERY DAY  Qty: 90 tablet, Refills: 3    Associated Diagnoses: Claudication of left lower extremity (H)      cholecalciferol 1000 UNITS TABS Take 1,000 Units by mouth daily  Qty: 30 tablet    Associated Diagnoses: Loop diuretic causing adverse effect in therapeutic use, subsequent encounter      Citalopram Hydrobromide (CELEXA PO) Take 10 mg by mouth daily      clopidogrel (PLAVIX) 75 MG tablet Take 1 tablet (75 mg) by mouth daily  Qty: 90 tablet, Refills: 3    Associated Diagnoses: Status post percutaneous transluminal coronary angioplasty; Coronary artery disease involving native coronary artery of native heart without angina pectoris      furosemide (LASIX) 20 MG tablet Take 2 tablets (40 mg) by mouth daily  Qty: 180 tablet, Refills: 3    Associated Diagnoses: Chronic systolic heart failure (H)      hydrocortisone (CORTIZONE-10) 1 % ointment Apply topically 2 times daily as needed for itching      MELATONIN PO Take 5 mg by mouth daily      metFORMIN (GLUCOPHAGE) 500 MG tablet Take 1 tablet (500 mg) by mouth 2 times daily (with meals)  Qty: 60 tablet    Associated Diagnoses: Type 2 diabetes mellitus with diabetic polyneuropathy, without long-term current use of insulin (H)      metoprolol succinate (TOPROL-XL) 200 MG 24 hr tablet Take 0.5 tablets (100 mg) by mouth daily  Qty: 90 tablet, Refills: 3    Associated Diagnoses: Chronic systolic heart failure (H)      nitroglycerin (NITROSTAT) 0.4 MG sublingual tablet For chest pain place 1 tablet under the tongue every 5 minutes for 3 doses. If symptoms persist 5 minutes after 1st dose call 911.  Qty: 25 tablet    Associated Diagnoses: Atrial flutter, unspecified type (H)      sodium chloride (EQ SALINE NASAL SPRAY) 0.65 % nasal spray  Spray 1 spray into both nostrils daily as needed for congestion  Qty: 1 Bottle, Refills: 1      warfarin (COUMADIN) 5 MG tablet Take 0.5 tablets tonight 9/24 and 1 tab tomorrow night 9/25. INR check on Wednesday 9/26 and continue as directed by coumadin clinic.  Qty: 45 tablet, Refills: 1    Associated Diagnoses: Chronic atrial fibrillation (H); Long-term (current) use of anticoagulants      order for DME Equipment being ordered: DH2 shoe  Qty: 1 Device, Refills: 0    Associated Diagnoses: 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); Diabetic ulcer of left foot due to type 2 diabetes mellitus (H)         STOP taking these medications       nystatin (MYCOSTATIN) cream Comments:   Reason for Stopping:         ranitidine (ZANTAC) 150 MG tablet Comments:   Reason for Stopping:[GC1.2]                   Discharge Instructions and Follow-Up:[GC1.1]      Follow-up Appointments     Follow Up and recommended labs and tests       Follow up with skilled nursing physician.  The following labs/tests are   recommended: CBC, BMP next week. INR daily or per routine for warfarin   dosing (goal INR 2-3 for atrial fibrillation).  Follow up with primary care provider after TCU discharge.  Surgical follow up per Vascular Surgery.[GC1.2]                          Consultations This Hospital Stay:      1. Podiatry.  2. Vascular Surgery.  3. Cardiology.        Admission History:      Please see the H&P by Trip Ruby DO on 9/30/2018 for complete details. Briefly, Tad Manning is a 77 year old male history including DM2 with neuropathy, HTN, CAD, CHF, PAD, CKD and atrial fibrillation, who presented 9/30 with R foot wound with concerns for osteomyelitis.        Problem Oriented Hospital Course:      R foot osteomyelitis with sepsis, related to diabetic foot wound and arterial insufficiency - s/p open distal R lower leg amputation with wound VAC 10/8 and 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.  Patient transferred from [facility] to  [facility] 9/30 for further evaluation of non-healing right foot ulcer. Started on vanco and Zosyn on admit. Seen by Vascular Surgery. MRI right foot 10/10 howed plantar lateral skin ulcer at the level of the fifth metatarsal head with devitalized soft tissue thickening distal to the ulcer in the plantar fifth toe; nonspecific mild bone marrow edema in the fifth metatarsal head and proximal phalanx base may be reactive change alone but early osteomyelitis not excluded. US WALLACE 10/1 showed left within normal limits, right suggested moderate arterial insufficiency. Delay in surgery due to cardiac issues (see below). Ultimately underwent R BKA 10/11. Vanco discontinued 10/9. Zosyn changed to Augmentin 10/12.  - Post-op management per Vascular.  - Continue Augmentin (started 10/12) - plan stop after 10/14.     Acute blood loss anemia on chronic anemia.  Baseline hgb around 9-10 range. Iron studies 10/1 suggested ACD +/- iron deficiency. TSH, B12, folate on 10/12 were normal. Received prbc's 10/3, 10/4, 10/8, 10/13.[GC1.1]   Recent Labs  Lab 10/15/18  0819 10/14/18  0757 10/13/18  0740 10/12/18  1150 10/11/18  1657 10/11/18  0821   HGB 8.1* 8.2* 7.3* 7.6* 7.8* 8.4*[GC1.2]   - Monitor CBC outpatient.     Ischemic cardiomyopathy with acute on chronic biventricular systolic CHF exacerbation.  Hypertension (benign essential).  [PTA BP meds/diuretics: furosemide 40 mg daily, metoprolol  mg daily.]  * S/p 3-vessel CABG in 1995. S/p PCI to mLAD 3/2018. Echo 7/2018 showed LVEF 25%.  * Seen by Cardiology this stay. Echo 10/5 showed LVEF 35-45%; RV systolic function mildly decreased. Diuresed with IV Lasix this stay.  * Wt (admit wt 177 lbs 9/30): 176 lbs 10/14 <-- 175 lbs 10/13 <-- 174 lbs <-- 179 lbs.  - Continue atorvastatin, clopidogrel, furosemide, metoprolol XL, PRN NTG.  - Monitor daily wts.     DM2 with vascular complications.  [PTA: metformin 500 mg BID.]  Hgb A1C 9.0 9/22. Started on Lantus this  "stay.[GC1.1]   Recent Labs  Lab 10/16/18  1145 10/16/18  0749 10/16/18  0100 10/15/18  2035 10/15/18  1725  10/12/18  0802   GLC  --   --   --   --   --   --  112*   * 156* 113* 119* 125*  < >  --    < > = values in this interval not displayed.[GC1.2]  - Continue Lantus 16U at bedtime.  - Continue aspart 10U TID with meals.  - Continue metformin 500 mg BID.  - Continue ISS.     Acute kidney injury, stage stage 2 on CKD stage 3.  Baseline cr around 1.4 previously this year. Cr 2.23 on admit 9/22. Cr steadily improved this hospitalization.     PAD.  * H/o lower extremity stents. H/o left lower extremity bypass 2014  * WALLACE 10/1 showed moderate arterial insufficiency on the R.  - Continue Plavix.      Persistent atrial fibrillation.  Restarted warfarin 10/14.  - Continue metoprolol XL, warfarin.     2nd degree AV Block.   Noted to have abnormal conduction on telemetry 9/22 at [facility]. EKG at that time showed sinus rhythm with 2nd degree AV block and appeared to have been present on previous EKG back in 7/2018. Seen by Cardiology this stay.  - F/u with Cardiology.     BPH.  Tamsulosin was recently discontinued due to recurrent falls.  - Monitor clinically.      GERD.  Previously on ranitidine. Protonix started this hospitalization.  - Continue pantoprazole.     Anxiety.  Discontinued Alphatrex recently due to high fall risk.  - Continue citalopram.      Physical deconditioning with h/o mechanical falls due to acute illness/chronic debilitation.  * Admitted from 9/21-9/24 at [facility] for acute on chronic kidney injury and mechanical fall/supratherapeutic INR. He reported that his \"knees give out\".   - Continue PT, OT.          Code Status:      Full Code        Pending Results:[GC1.1]        Unresulted Labs Ordered in the Past 30 Days of this Admission     Date and Time Order Name Status Description    10/11/2018 1335 Surgical pathology exam In process     10/8/2018 1509 Surgical pathology exam In process[GC1.2]           "       Discharge Disposition:      Discharged to TCU.        Discharge Time:      Greater than 30 minutes.        Key Imaging Studies, Lab Findings and Procedures/Surgeries:        Results for orders placed or performed during the hospital encounter of 09/30/18   MR Foot Right w/o & w Contrast    Narrative    MR FOOT RIGHT WITH AND WITHOUT CONTRAST October 1, 2018 6:38 AM    HISTORY: Open wound lateral plantar right forefoot for six to seven  weeks. Diabetic patient. Evaluate for osteomyelitis.    TECHNIQUE: Multisequence multiplanar MRI without and with IV contrast.  8 mL Gadavist given intravenously.    COMPARISON: None.    FINDINGS: Some sequences are compromised by motion artifact. Area of  presumed current skin ulcer is seen in the lateral plantar region at  the level of the fifth metatarsal head (image 4 of series 5). The  plantar soft tissues of the little toe are swollen but show decreased  contrast enhancement consistent with tissue devitalization. There is  no evidence for an abscess in this region. There is mild bone marrow  edema in the fifth metatarsal head as well as at the base of the fifth  proximal phalanx. This may be reactive edema alone, especially since  the ulcer appears relatively shallow. Also, there is no cortical  effacement. However, very early osteomyelitis is not excluded (image 4  of series 5). No fifth metatarsophalangeal joint effusion.    The remainder of the bony structures are unremarkable. Edema and mild  contrast enhancement of the plantar intrinsic muscles of the foot,  commonly seen in diabetic patients. There is some unusual foci of  decreased signal in the plantar soft tissues at the level of the  second and third metatarsal heads. This appears to be some type of  postoperative change.      Impression    IMPRESSION:  1. Plantar lateral skin ulcer at the level of the fifth metatarsal  head. Devitalized soft tissue thickening distal to the ulcer in the  plantar fifth toe.  2.  Nonspecific mild bone marrow edema in the fifth metatarsal head and  proximal phalanx base may be reactive change alone but early  osteomyelitis is not excluded.  3. Probable postoperative changes in the soft tissues of the distal  forefoot more medially.    RACHAEL SPARKS MD   US WALLACE Doppler No Exercise    Narrative    US WALLACE DOPPLER NO EXERCISE, 1-2 LEVELS,??? BILAT  10/1/2018 5:13 PM     HISTORY: Assess blood flow, osteomyelitis of the right foot.    COMPARISON: None.    FINDINGS:   The resting right and left ankle-brachial indices are 0.60 and 1.05  respectively.    Waveform analysis indicates monophasic waveforms in the distal right  tibial arteries and biphasic to triphasic waveforms in the distal left  tibial arteries.      Impression    IMPRESSION: WALLACE on the left is within normal limits. WALLACE on the right  would indicate moderate arterial insufficiency. Given osteomyelitis of  the right foot, further evaluation and possible intervention with  angiography could be performed.      WALLACE Diagnostic Criteria      >/= 1.3          Non compressible  0.95-1.0          Normal  0.90-0.94        Mild PAD  0.50-0.89        Moderate PAD  0.20-0.49        Severe PAD  <0.20               Critical    DYLAN MILLER MD   XR Chest Port 1 View    Narrative    CHEST ONE VIEW PORTABLE       PROCEDURE DATE:  10/4/2018 6:26 AM     HISTORY:   Evaluate CHF;     COMPARISON:  5/25/2017    FINDINGS/    Impression    IMPRESSION:   The right costophrenic angle and right lung bases is incompletely  imaged and therefore not evaluated. Postsurgical changes from prior  CABG included sternotomy wires and mediastinal clips. Posterior spinal  fusion hardware is present. Cardiomediastinal silhouette is not  enlarged. Increased interstitial markings and perihilar opacities  favoring pulmonary edema.    RUTH ANN JURADO MD   XR Chest 1 View    Narrative    CHEST ONE VIEW UPRIGHT 10/4/2018 8:35 AM     HISTORY: Pre-op.     COMPARISON: October 4,  2018      Impression    IMPRESSION: No acute infiltrates. There is a sternotomy and vascular  clips consistent with CABG.    ROSANA HERNANDEZ MD     Limited echo 10/5/2018:    Interpretation Summary     The visual ejection fraction is estimated at 35-40%.  There is mild-moderate global hypokinesia of the left ventricle.  Mildly decreased right ventricular systolic function     In comparison with the previous study the LVEF has improved slightly.    Surgical procedure 10/8/2018:  Open distal right lower leg amputation with placement of wound VAC.    Surgical procedure 10/12/2018:  Formalization (closure) of right below-knee amputation.[GC1.1]      Revision History        User Key Date/Time User Provider Type Action    > [N/A] 10/16/2018 12:49 PM Abdiel Alejandro MD Physician Addend     GC1.2 10/16/2018 12:14 PM Abdiel Alejandro MD Physician Sign     GC1.1 10/15/2018  3:49 PM Abdiel Alejandro MD Physician                      Consult Notes      Consults by Verónica Mora RN at 10/12/2018 11:07 AM     Author:  Verónica Mora RN Service:  (none) Author Type:      Filed:  10/12/2018  2:51 PM Date of Service:  10/12/2018 11:07 AM Creation Time:  10/12/2018 11:02 AM    Status:  Addendum :  Verónica Mora RN ()     Consult Orders:    1. Care Transition RN/SW IP Consult [[id number]] ordered by Amador Vick MD at 10/12/18 1101                Care Transition Initial Assessment - RN        Met with: Patient--phone call out to sister in [place] [facility] per patient request[JM1.1]--Joyce called back and had questions about placement post discharge, explained patient had a prolonged stay at [facility] about 1 1/2 yrs ago.  Joyce has concerns about accessibility in his apartment. 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        Contact information and PCP information verified: Yes  Lives With: significant other                    Insurance concerns: No Insurance issues identified  ASSESSMENT  Patient currently receives the following services:  Was open to [facility] services prior to admission for therapy and RN         Identified issues/concerns regarding health management: anticipates adjustment needed with new R BKA  Patient lives in a 3rd floor apartment with elevator access, he was using [facility] services. He anticipates returning home once rehab complete but anticipates needing transportation help as he is anticipating not being able to drive.   PLAN  Financial costs for the patient include per insurance .  Patient given options and choices for discharge yes, we discussed both acute rehab and TCU I provided him with acute rehab brochure and a listing of TCU's.  Patient/family is agreeable to the plan?  Undetermined yet, awaiting rehab recommendations  Patient anticipates discharging to some type of rehab .        Patient anticipates needs for home equipment: Does not know  Plan/Disposition: [facility] vs TCU pending therapy evaluations and patient choice  Appointments:     Care  (CTS) will continue to follow as needed.[JM1.1]             Revision History        User Key Date/Time User Provider Type Action    > JM1.2 10/12/2018  2:51 PM Verónica Mora RN Case Manager Addend     JM1.1 10/12/2018 11:07 AM Verónica Mora RN Case Manager Sign            Consults signed by Oumar Frausto MD at 10/8/2018  5:29 PM       Author:  Oumar Frausto MD Service:  Cardiology Author Type:  Physician    Filed:  10/8/2018  5:29 PM Date of Service:  10/4/2018  5:35 PM Creation Time:  10/4/2018  6:51 PM    Status:  Signed :  Oumar Frausto MD (Physician)         Consult Date:  10/04/2018      CARDIOLOGY CONSULTATION       REASON FOR CONSULTATION:  Cardiac clearance for planned surgery.      HISTORY:  This is a pleasant 77-year-old gentleman interviewed on the 6th floor of [facility].  I have been asked, the patient to determine if we can optimize his heart in preparation for planned partial amputation.  He is a patient of my partner, Dr. Valdes, from the [facility].  Per her note, she just saw him a few months ago.  He had coronary artery disease with ischemic cardiomyopathy, bypass grafting in 1995 x 3.  The details are not in the chart, but he also has had angioplasty and stenting.  He has a history of atrial flutter.  He has a Mobitz I Wenckebach not symptomatic, hypertension, diabetes, hyperlipidemia, diskitis, congestive heart failure with reduced ejection fraction and most importantly peripheral vascular disease.  He has had iliac stents on both sides in and left lower extremity bypass surgery.        His last echocardiogram dated 07/06/2018 demonstrates ejection fraction of 25%, moderate left ventricular enlargement, had diffuse hypokinesis.  Right ventricle was noted to be mildly dilated with moderate to severely reduced right ventricular ejection fraction.  There was biatrial enlargement, mild mitral regurgitation, aortic valve sclerosis without stenosis, mild tricuspid insufficiency, normal estimated right heart pressures.  His last angiogram was done 03/2018.  Right heart cath was performed at the same time, right atrial mean pressure was 17 which is elevated, RV pressures 55/18 which suggests he did indeed have pulmonary hypertension not noted on the echo.  The pulmonary capillary wedge  pressure was 24.  The cardiac index by thermal was 1.7 and by Marisabel 2.1 suggesting some modest reduction.  It should be noted that he was 96 kg at the time of that and now he is down to 80 kg after therapy, so there is a reason to believe that the numbers may have improved after that heart catheterization and by the time the echo was then done.  At the time of that heart catheterization they also placed a 3.0 x 20 mm Synergy stent in the LAD and a 2.75 x 38 mm stent in LAD.  I believe those stents to the LAD were placed via the graft to the LAD since the LAD itself was occluded proximally.  The circumflex was occluded proximally, but there was a graft to an OM.  The right coronary artery had 50% narrowing or less.   The university uses a slightly different form than we are used to.  Reading to their form, I believe the right coronary had no more than 50% narrowing.  The left main had 50% narrowing or less.  The LAD lesion as I alluded to was repaired with stenting, a diagonal #1 was filled by a graft.  The left circumflex I believe was grafted.        Intermittently he has had difficulty with some of his heart failure medicines, i.e., some of the medicines would cause renal insufficiency to get worse, some would cause the blood pressure to drop and so there have been multiple changes in his medications because of that.  Recently though he states he may get a little lightheaded if he stands up and walks for a while, but no current recent syncope.  Denies undue shortness of breath.  He denies any angina.  When he saw Dr. Valdes in 07/2018, he appeared to be quite stable and she suggested that his medicines were stable and he was doing well.        The patient was admitted now for a diabetic right foot infection with osteomyelitis and a plan for possible amputation.  He has had debridement and again he has peripheral vascular disease as I alluded to above.  Blood cultures have been negative today.  Lactic acid negative.   Sed rate and CRP elevated, however.      PAST MEDICAL HISTORY:     1.  Cardiac as above.     2.  Peripheral vascular disease.     3.  Remote atrial flutter.     4.  Diabetes mellitus type 2.   5.  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   6.  Chronic renal insufficiency.   7.  History of intermittent hyponatremia.   8.  Chronic anemia.   9.  Osteomyelitis as alluded to above.   10.  Hypertension.   11.  Hyperlipidemia   12.  Previous blood transfusion.   13.  CLL B-type, in remission.  No active treatment now.   14.  BPH.   15.  Arthritis.   16.  Cellulitis.   17.  Sebaceous cyst.   18.  Tonsillectomy   19.  Previous toe amputations.   20.  Cyst removal.     21.  Laminectomy.      ADMISSION MEDICATIONS:   1.  Tylenol p.r.n.   2.  Lipitor 20 mg daily.   3.  Vitamin D3, 1000 units daily.   4.  Celexa 10 mg daily.   5.  Plavix 75 mg daily.   7.  Lasix 40 mg daily.   8.  Hydrocortisone cream as needed.     9.  Melatonin as needed.     10.  Glucophage 500 mg b.i.d.   11.  Toprol- mg daily.   12.  Mycostatin cream p.r.n.   13.  Zantac 150 mg b.i.d. p.r.n.   14.  Coumadin in variable dose, which is on hold.        Here in the hospital he has also received IV Lasix, insulin, vancomycin and Zosyn.        ALLERGY:  NO MEDICATION ALLERGIES.      SOCIAL HISTORY:  Remote smoker, stopping in 1995.  Does not drink alcohol.  The patient is single.      FAMILY HISTORY:  Includes leukemia in his mother.      REVIEW OF SYSTEMS:  Outlined as above.      PHYSICAL EXAMINATION:   GENERAL:  Reveals a gentleman who lies comfortably in bed.     VITAL SIGNS:  As I mentioned, his weight is down significantly compared to his pre-heart cath including right heart cath and stent.  He is now 80.5 kg on admission.  He has not had a weight in the last 4 days, however.  Currently, blood pressure 120/57, heart rate 70, temperature 97.5.   SKIN:  No petechiae or rash.   HEENT:  Nonicteric.   NECK:  Supple without thyromegaly.   CHEST:  Exam is  surprisingly clear at this point.   CARDIAC:  Regular rate and rhythm with a very minimal systolic murmur.  Carotid pulses 2+, questionable bruit on the right.  Femoral pulses are 1-2+ with bilateral bruits, more on the right than the left.  Dorsal pedal pulses are trace.  Neck veins are relatively flat.   ABDOMEN:  Exam is nontender.  There is no organomegaly.   EXTREMITIES:  There is no edema.  His feet, both of them are wrapped in Ace wraps.  I did not uncover them.      LABORATORY DATA:  Today sodium 136, potassium 3.6, creatinine 1.14.  NT-BNP 21,539, also it is important to note that is compared to 22,172 in February around the time of his previous heart cath.  Although elevated, is not too dissimilar.  White count 6.3, hemoglobin 9.3, platelet count 103,000, MCV 87.        Chest x-ray dated 10/04 reports no acute infiltrates.  Sternotomy seen.  No mention of fluid effusions, etc.        EKG last in the chart is 09/22/2018 demonstrates sinus rhythm with Wenckebach, leftward axis, nonspecific ST-T changes V1-V6.        Echo is as above.      IMPRESSION:  The patient although having had an elevated BNP, has a known ischemic cardiomyopathy.  He did have pulmonary hypertension, but that is when his weight was up significantly and it appears that under Dr. Valdes's tutelage, his numbers have gotten better.  His weight is down and assuming that last echo was correct, his pulmonary pressures are improved.  They are requesting a current echo so we will plan on getting one tomorrow.  He was revascularized approximately 6 months ago, so I do not think additional stress testing would be necessary.        We will review the echocardiogram tomorrow and most likely clear him for surgery.  I would like to point out it is important to the surgeon to know that the patient is still getting his Plavix even though the warfarin was held and so now we want to make sure the surgeon is comfortable with that.  Otherwise, the Plavix  would need to be held 5 days, although we would ideally like the patient to stay on Plavix for up to a year post-stent.  If they do need to move forward, there is some data suggesting it is reasonable this far out, i.e., roughly 6 months to take the patient off Plavix.  If the surgeon decides to wait on the osteomyelitis surgery and stop the Plavix for 5 days, if they would be willing operate on aspirin that would be preferable and then after surgery they can restart the Plavix and Coumadin without aspirin.        In terms of the heart failure, he has received IV diuretics.  We will get daily weights so we have a better idea what his weight is going into surgery.  We will follow the BNP and we will see him tomorrow after the echocardiogram.  I want to make sure that we point out to the surgeon he is on Plavix if they are comfortable operating with that.  Otherwise, the protocol would be as above.  Unless there is a dramatic change in the echo compared to the previous one earlier in the year, I would plan on clearing the patient for his surgery.         MELA FRAUSTO MD             D: 10/04/2018   T: 10/04/2018   MT: NTS      Name:     PETER BUTLER   MRN:      [id number]        Account:       [id number]   :      1940           Consult Date:  10/04/2018      Document: [id number]       cc: Gene Valdes MD   [SH1.1]   Revision History        User Key Date/Time User Provider Type Action    > SH1.1 10/8/2018  5:29 PM Mela Frausto MD Physician Sign            Consults by Mela Frausto MD at 10/4/2018  5:36 PM     Author:  Mela Frausto MD Service:  Cardiology Author Type:  Physician    Filed:  10/4/2018  5:36 PM Date of Service:  10/4/2018  5:36 PM Creation Time:  10/4/2018  5:35 PM    Status:  Signed :  Mela Frausto MD (Physician)         Cardio consult dictated  Will get echo and compare to prior  nt bnp elevated but with long term chf this is not  unexpected  Need daily weights  No need for ischemia eval with recent stent/cath  NOTE HE IS ON PLAVIX make sure this is ok with surgeon[SH1.1]     Revision History        User Key Date/Time User Provider Type Action    > SH1.1 10/4/2018  5:36 PM Oumar Frausto MD Physician Sign            Consults by Amador Vick MD at 10/1/2018  5:45 PM     Author:  Amador Vick MD Service:  Vascular Surgery Author Type:  Physician    Filed:  10/2/2018  6:54 PM Date of Service:  10/1/2018  5:45 PM Creation Time:  10/1/2018  5:45 PM    Status:  Addendum :  Amador Vick MD (Physician)     Consult Orders:    1. Vascular Surgery IP Consult: Patient to be seen: ASAP - within 4 hours; Call back #: [phone]; Below knee amputation on the right foot, likely necrotizing fasciitis of the right foot.; Consultant may enter orders: Yes [[id number]] ordered by Kathi Nye DPM, Podiatry/Foot and Ankle Surgery at 10/01/18 1633                [facility]    Vascular Surgery Note    Date of Admission:  9/30/2018    Assessment & Plan   Tad Manning is a 77 year old male who was admitted on 9/30/2018. I was asked to see the patient for diabetic right foot infection. Patient had previous left LE bypass with Dr. Vcik in 2014. He has had worsening right foot wounds. Currently he has open draining wound on his right foot with diminished WALLACE 0.6 and non-palpable distal pulse. Elevated WBC and hyperglycemic. Patient also recently had drug eluting cardiac stents placed in 3/2018. He is also on coumadin for a-fib[MX1.1]     -Severe right leg peripheral arterial disease with extensive full-thickness ulceration and blistering of the plantar aspect of the right forefoot.  This probes to bone.  I agree with Dr. Nye that given the magnitude of this necrotic process this is a non-salvageable right foot.  Transmetatarsal amputation is not a realistic option.  I feel he is far better served by a right  below-knee amputation.  I will likely do this in a two-stage manner.  Tentatively plan an open right lower leg amputation just above the ankle  this Thursday.  Continue appropriate antibiotic coverage.  Plan to formalize or close the amputation sometime early next week.[TG1.1]    Principal Problem:    Probable Osteomyelitis Right Foot  Active Problems:    DM type 2 w neuropathy -- Hgb A1C 9.0 on 9/22/18    HTN (hypertension)    CAD -- S/P CABG    Osteomyelitis left 2nd Toe s/p amputaion 1/10/14    CRF (chronic renal failure), stage 3 (moderate)    Chronic systolic heart failure (H)    Atrial fibrillation -- on Warfarin    Hyponatremia      Sam Silva MD    Code Status    Full Code    Reason for Consult   Right foot wound     History of Present Illness   Tad Manning is a 77 year old male known to vascular surgery service for bilateral PAD. Patient has a history of LLE AK-BK popliteal bypass in 2014 with Dr. Vick. He was following up with Dr. Vick until 2016. Patient had documented ulcer at the right 5th toe at that time, and was following a podiatrist. He says that he has had this new wound to the bottom of his foot for at least 6-8wks. He cannot remember any trauma to the foot.     Past Medical History   I have reviewed this patient's medical history and updated it with pertinent information if needed.[MX1.1]   Past Medical History:   Diagnosis Date     Arthritis      ASCVD (arteriosclerotic cardiovascular disease)      BMI 30.0-30.9,adult      BPH (benign prostatic hypertrophy)      Cellulitis      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)      Coronary artery disease     triple bypass 1995     Diabetes mellitus (H)      Diabetic polyneuropathy (H)      History of blood transfusion      Hyperlipidaemia      Hypertension      Noninflammatory pericardial effusion      Osteomyelitis of left foot (H)      PVD (peripheral vascular disease) (H)      Sebaceous cyst[MX1.2]         Past Surgical History   I have reviewed this patient's surgical history and updated it with pertinent information if needed.[MX1.1]  Past Surgical History:   Procedure Laterality Date     AMPUTATE TOE(S)  1/10/2014    Procedure: AMPUTATE TOE(S);;  Surgeon: Amador Vick MD;  Location:  OR     BONE MARROW BIOPSY, BONE SPECIMEN, NEEDLE/TROCAR  10/19/2012    Procedure: BIOPSY BONE MARROW;  BONE MARROW BIOPSY  (CONSCIOUS SED);  Surgeon: Ramon Quesada MD;  Location:  GI     BYPASS GRAFT FEMOROPOPLITEAL  1/10/2014    Procedure: BYPASS GRAFT FEMOROPOPLITEAL;  Left above knee popliteal to  Below knee popliteal bypass using transverse saphenous vein graft. Left 2nd Toe amputation;  Surgeon: Amador Vick MD;  Location:  OR     CARDIAC SURGERY      angioplasty with stent, triple bypass     EXCISE CYST GENERIC (LOCATION) N/A 2/1/2016    Procedure: EXCISE CYST GENERIC (LOCATION);  Surgeon: Amador Vick MD;  Location:  SD     GRAFT VEIN FROM EXTREMITY (LOCATION)  1/10/2014    Procedure: GRAFT VEIN FROM EXTREMITY (LOCATION);;  Surgeon: Amador Vick MD;  Location:  OR     LAMINECTOMY THORACIC ONE LEVEL N/A 2/8/2017    Procedure: LAMINECTOMY THORACIC ONE LEVEL;  Surgeon: Marcelo Trent MD;  Location: [facility] OR     OPTICAL TRACKING SYSTEM FUSION POSTERIOR SPINE THORACIC THREE+ LEVELS N/A 2/12/2017    Procedure: OPTICAL TRACKING SYSTEM FUSION POSTERIOR SPINE THORACIC THREE+ LEVELS;  Surgeon: Marcelo Trent MD;  Location: [facility] OR     TONSILLECTOMY       VASCULAR SURGERY      ptcr legs[MX1.2]       Prior to Admission Medications   Prior to Admission Medications   Prescriptions Last Dose Informant Patient Reported? Taking?   Citalopram Hydrobromide (CELEXA PO) 9/30/2018 at AM  Yes Yes   Sig: Take 10 mg by mouth daily   MELATONIN PO 9/29/2018 at HS  Yes Yes   Sig: Take 5 mg by mouth daily   acetaminophen (TYLENOL) 325 MG tablet  at PRN  Yes Yes   Sig: Take 325 mg by mouth every 4  hours as needed for mild pain   atorvastatin (LIPITOR) 20 MG tablet 9/30/2018 at AM  No Yes   Sig: TAKE 1 TABLET BY MOUTH EVERY DAY   cholecalciferol 1000 UNITS TABS 9/30/2018 at AM  No Yes   Sig: Take 1,000 Units by mouth daily   clopidogrel (PLAVIX) 75 MG tablet  at AM vs PM?  No Yes   Sig: Take 1 tablet (75 mg) by mouth daily   furosemide (LASIX) 20 MG tablet 9/30/2018 at AM  No Yes   Sig: Take 2 tablets (40 mg) by mouth daily   hydrocortisone (CORTIZONE-10) 1 % ointment  at PRN  Yes Yes   Sig: Apply topically 2 times daily as needed for itching   metFORMIN (GLUCOPHAGE) 500 MG tablet 9/30/2018 at Unknown time  No Yes   Sig: Take 1 tablet (500 mg) by mouth 2 times daily (with meals)   metoprolol succinate (TOPROL-XL) 200 MG 24 hr tablet 9/29/2018 at PM  No Yes   Sig: Take 0.5 tablets (100 mg) by mouth daily   nitroglycerin (NITROSTAT) 0.4 MG sublingual tablet  at PRN  No Yes   Sig: For chest pain place 1 tablet under the tongue every 5 minutes for 3 doses. If symptoms persist 5 minutes after 1st dose call 911.   nystatin (MYCOSTATIN) cream  at PRN  Yes Yes   Sig: Apply topically daily as needed    order for DME  Self No No   Sig: Equipment being ordered: 2 shoe   ranitidine (ZANTAC) 150 MG tablet  at PRN  No Yes   Sig: Take 1 tablet (150 mg) by mouth 2 times daily   Patient taking differently: Take 150 mg by mouth 2 times daily as needed for heartburn    sodium chloride (EQ SALINE NASAL SPRAY) 0.65 % nasal spray  at PRN  No Yes   Sig: Spray 1 spray into both nostrils daily as needed for congestion   warfarin (COUMADIN) 5 MG tablet 9/29/2018 at Unknown time  No Yes   Sig: Take 0.5 tablets tonight 9/24 and 1 tab tomorrow night 9/25. INR check on Wednesday 9/26 and continue as directed by coumadin clinic.      Facility-Administered Medications: None     Allergies   Allergies   Allergen Reactions     Blood Transfusion Related (Informational Only) Other (See Comments)     Patient has a history of a clinically  significant antibody against RBC antigens.  A delay in compatible RBCs may occur.        Social History     Physical Exam   Temp: 98.5  F (36.9  C) Temp src: Oral BP: 110/49 Pulse: 85   Resp: 16 SpO2: 99 % O2 Device: None (Room air)    Vital Signs with Ranges  Temp:  [98.3  F (36.8  C)-99.4  F (37.4  C)] 98.5  F (36.9  C)  Pulse:  [] 85  Resp:  [16-18] 16  BP: (100-128)/(49-83) 110/49  SpO2:  [95 %-99 %] 99 %  177 lbs 8 oz    PE:   NAD, awake and alert  Chest: S1 S2 present, RRR, no wheezing   Abd: soft, NT, ND, no rebound or guarding  Ex: Palpable bilateral femoral pulse. L>R. Non-palpable distal DP/PT pulses on the right. Doppler multiphasic distal signals. Small 1cm ulcer to the lateral right 5th toe. 1-2cm open wound at middle plantar surface of the right foot with necrotic tissue and malodor drainage. No wounds on the left foot.     Data   CBC RESULTS:   Recent Labs   Lab Test  10/01/18   0852   WBC  13.3*   RBC  2.72*   HGB  8.6*   HCT  24.5*   MCV  90   MCH  31.6   MCHC  35.1   RDW  14.3   PLT  97*     Last Comprehensive Metabolic Panel:[MX1.1]  Sodium   Date Value Ref Range Status   10/01/2018 128 (L) 133 - 144 mmol/L Final     Potassium   Date Value Ref Range Status   10/01/2018 4.3 3.4 - 5.3 mmol/L Final     Chloride   Date Value Ref Range Status   10/01/2018 94 94 - 109 mmol/L Final     Carbon Dioxide   Date Value Ref Range Status   10/01/2018 22 20 - 32 mmol/L Final     Anion Gap   Date Value Ref Range Status   10/01/2018 12 3 - 14 mmol/L Final     Glucose   Date Value Ref Range Status   10/01/2018 295 (H) 70 - 99 mg/dL Final     Urea Nitrogen   Date Value Ref Range Status   10/01/2018 32 (H) 7 - 30 mg/dL Final     Creatinine   Date Value Ref Range Status   10/01/2018 1.60 (H) 0.66 - 1.25 mg/dL Final     GFR Estimate   Date Value Ref Range Status   10/01/2018 42 (L) >60 mL/min/1.7m2 Final     Comment:     Non  GFR Calc     Calcium   Date Value Ref Range Status   10/01/2018 8.2 (L)  8.5 - 10.1 mg/dL Final[MX1.3]                Revision History        User Key Date/Time User Provider Type Action    > TG1.1 10/2/2018  6:54 PM Amador Vick MD Physician Addend     MX1.3 10/1/2018  5:55 PM Sam Silva MD Fellow Sign     MX1.2 10/1/2018  5:48 PM Sam Silva MD Fellow      MX1.1 10/1/2018  5:45 PM Sam Silva MD Fellow             Consults by Kathi Nye DPM, Podiatry/Foot and Ankle Surgery at 10/1/2018 10:16 AM     Author:  Kathi Nye DPM, Podiatry/Foot and Ankle Surgery Service:  Surgery Author Type:  Physician    Filed:  10/1/2018  4:53 PM Date of Service:  10/1/2018 10:16 AM Creation Time:  10/1/2018 10:10 AM    Status:  Addendum :  Kathi Nye DPM, Podiatry/Foot and Ankle Surgery (Physician)     Consult Orders:    1. Podiatry IP Consult: Patient to be seen: Routine - within 24 hours; Concern for osteomyelitis; Consultant may enter orders: Yes [[id number]] ordered by Trip Ruby DO at 09/30/18 4061                PATIENT HISTORY:  Tad Manning is a 77 year old male who was admitted for right foot infection.       I was asked to see Tad Manning  by  for right foot wound, possible osteomyelitis.[JB1.1]     Patient was seen at bedside. Notes that he has had a left 2nd toe amputation a while ago with Dr. Vick.  Not sure how long he has had the wound. Normally numb but has pain to right foot 5/10.[JB1.2]     Review of Systems:  Patient denies fever, chills, rash,  stiffness, limping, weakness, heart burn, blood in stool, chest pain with activity, calf pain when walking, shortness of breath with activity, chronic cough, easy bleeding/bruising, swelling of ankles, excessive thirst, fatigue, depression, anxiety.  Patient admits to[JB1.1] wound, numbness.[JB1.2].     PAST MEDICAL HISTORY:   Past Medical History:   Diagnosis Date     Arthritis      ASCVD (arteriosclerotic cardiovascular disease)      BMI 30.0-30.9,adult      BPH (benign  prostatic hypertrophy)      Cellulitis      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)      Coronary artery disease     triple bypass 1995     Diabetes mellitus (H)      Diabetic polyneuropathy (H)      History of blood transfusion      Hyperlipidaemia      Hypertension      Noninflammatory pericardial effusion      Osteomyelitis of left foot (H)      PVD (peripheral vascular disease) (H)      Sebaceous cyst         PAST SURGICAL HISTORY:   Past Surgical History:   Procedure Laterality Date     AMPUTATE TOE(S)  1/10/2014    Procedure: AMPUTATE TOE(S);;  Surgeon: Amador Vick MD;  Location:  OR     BONE MARROW BIOPSY, BONE SPECIMEN, NEEDLE/TROCAR  10/19/2012    Procedure: BIOPSY BONE MARROW;  BONE MARROW BIOPSY  (CONSCIOUS SED);  Surgeon: Ramon Quesada MD;  Location:  GI     BYPASS GRAFT FEMOROPOPLITEAL  1/10/2014    Procedure: BYPASS GRAFT FEMOROPOPLITEAL;  Left above knee popliteal to  Below knee popliteal bypass using transverse saphenous vein graft. Left 2nd Toe amputation;  Surgeon: Amador Vick MD;  Location:  OR     CARDIAC SURGERY      angioplasty with stent, triple bypass     EXCISE CYST GENERIC (LOCATION) N/A 2/1/2016    Procedure: EXCISE CYST GENERIC (LOCATION);  Surgeon: Amador Vick MD;  Location:  SD     GRAFT VEIN FROM EXTREMITY (LOCATION)  1/10/2014    Procedure: GRAFT VEIN FROM EXTREMITY (LOCATION);;  Surgeon: Amador Vick MD;  Location:  OR     LAMINECTOMY THORACIC ONE LEVEL N/A 2/8/2017    Procedure: LAMINECTOMY THORACIC ONE LEVEL;  Surgeon: Marcelo Trent MD;  Location:  OR     OPTICAL TRACKING SYSTEM FUSION POSTERIOR SPINE THORACIC THREE+ LEVELS N/A 2/12/2017    Procedure: OPTICAL TRACKING SYSTEM FUSION POSTERIOR SPINE THORACIC THREE+ LEVELS;  Surgeon: Marcelo Trent MD;  Location:  OR     TONSILLECTOMY       VASCULAR SURGERY      ptcr legs        MEDICATIONS:   Current Facility-Administered  Medications:      acetaminophen (TYLENOL) tablet 650 mg, 650 mg, Oral, Q4H PRN, Trip Ruby DO     atorvastatin (LIPITOR) tablet 20 mg, 20 mg, Oral, Daily, Trip Ruby, , 20 mg at 10/01/18 0845     cholecalciferol (vitamin D3) tablet 1,000 Units, 1,000 Units, Oral, Daily, Trip Ruby DO, 1,000 Units at 10/01/18 0845     citalopram (celeXA) tablet 10 mg, 10 mg, Oral, Daily, Trip Ruby, , 10 mg at 10/01/18 0845     clopidogrel (PLAVIX) tablet 75 mg, 75 mg, Oral, Daily, Trip Ruby, , 75 mg at 10/01/18 0845     glucose gel 15-30 g, 15-30 g, Oral, Q15 Min PRN **OR** dextrose 50 % injection 25-50 mL, 25-50 mL, Intravenous, Q15 Min PRN **OR** glucagon injection 1 mg, 1 mg, Subcutaneous, Q15 Min PRN, Trip Ruby DO     insulin aspart (NovoLOG) inj (RAPID ACTING), 1-7 Units, Subcutaneous, TID AC, Trip Ruby DO     insulin aspart (NovoLOG) inj (RAPID ACTING), 1-5 Units, Subcutaneous, At Bedtime, Trip Ruby DO, 3 Units at 10/01/18 0025     melatonin tablet 1 mg, 1 mg, Oral, At Bedtime PRN, Trip Ruby DO     melatonin tablet 5 mg, 5 mg, Oral, At Bedtime, Trip Ruby, , 5 mg at 10/01/18 0024     metoprolol succinate (TOPROL-XL) 24 hr tablet 100 mg, 100 mg, Oral, Daily, Trip Ruby DO, 100 mg at 10/01/18 0024     naloxone (NARCAN) injection 0.1-0.4 mg, 0.1-0.4 mg, Intravenous, Q2 Min PRN, Trip Ruby DO     ondansetron (ZOFRAN-ODT) ODT tab 4 mg, 4 mg, Oral, Q6H PRN **OR** ondansetron (ZOFRAN) injection 4 mg, 4 mg, Intravenous, Q6H PRN, Trip Ruby DO     oxyCODONE IR (ROXICODONE) tablet 5-10 mg, 5-10 mg, Oral, Q3H PRN, Trip Ruby DO     piperacillin-tazobactam (ZOSYN) 3.375 g vial to attach to  mL bag, 3.375 g, Intravenous, Q6H, Trip Ruby, , 3.375 g at 10/01/18 0647     ranitidine (ZANTAC) tablet 150 mg, 150 mg, Oral,  BID PRN, Trip Ruby DO     senna-docusate (SENOKOT-S;PERICOLACE) 8.6-50 MG per tablet 1 tablet, 1 tablet, Oral, BID PRN **OR** senna-docusate (SENOKOT-S;PERICOLACE) 8.6-50 MG per tablet 2 tablet, 2 tablet, Oral, BID PRN, Trip Ruby DO     vancomycin (VANCOCIN) 1,750 mg in sodium chloride 0.9 % 500 mL intermittent infusion, 1,750 mg, Intravenous, Q24H, Trip Ruby DO, Last Rate: 125 mL/hr at 10/01/18 0025, 1,750 mg at 10/01/18 0025     ALLERGIES:    Allergies   Allergen Reactions     Blood Transfusion Related (Informational Only) Other (See Comments)     Patient has a history of a clinically significant antibody against RBC antigens.  A delay in compatible RBCs may occur.         SOCIAL HISTORY:   Social History     Social History     Marital status: Single     Spouse name: N/A     Number of children: N/A     Years of education: N/A     Occupational History     Not on file.     Social History Main Topics     Smoking status: Former Smoker     Packs/day: 2.00     Years: 30.00     Types: Cigarettes     Quit date: 6/19/1995     Smokeless tobacco: Never Used     Alcohol use No     Drug use: No     Sexual activity: Not on file     Other Topics Concern     Not on file     Social History Narrative    Lives independently with SO. 2/9/2017         FAMILY HISTORY:   Family History   Problem Relation Age of Onset     Cancer Mother      leukemia        EXAM:Vitals: /54 (BP Location: Left arm)  Pulse 82  Temp 99.2  F (37.3  C) (Oral)  Resp 18  Ht 6' (1.829 m)  Wt 177 lb 8 oz (80.5 kg)  SpO2 97%  BMI 24.07 kg/m2  BMI= Body mass index is 24.07 kg/(m^2).    LABS:    WBC   Date Value Ref Range Status   10/01/2018 13.3 (H) 4.0 - 11.0 10e9/L Final     Lactic acid: 2.1    HA1C: 9.0 (9/2018)[JB1.1]  ESR: 67  CRP: 208.0[JB1.2]    INR: 2.75    General appearance: Patient is alert and fully cooperative with history & exam.  No sign of distress is noted during the visit.      Psychiatric:  Affect is pleasant & appropriate.  Patient appears motivated to improve health.       Respiratory: Breathing is regular & unlabored while sitting.      HEENT: Hearing is intact to spoken word.  Speech is clear.  No gross evidence of visual impairment that would impact ambulation.       Dermatologic:[JB1.1] full thickness ulceration to the plantar aspect of the right 5th metatarsal head that measures 1.0cm x 1.0cm x 0.4cm. Probes to bone.  Does track 6 cm across the plantar aspect of the right foot.  Black fluid filled blister to the distal 2/3 of the plantar foot. Upon debridement, ulcer communicates with blister and there is significant purulent drainage and malodor noted.[JB1.2]      Vascular: DP & PT pulses[JB1.1] are not palpable on the right foot.[JB1.2]  edema noted[JB1.1] to right plantar foot, arch and up the medial ankle[JB1.2].[JB1.1]  Skin temp to right foot is warm.[JB1.2]      Neurologic: Lower extremity sensation is[JB1.1] diminished.[JB1.2]      Musculoskeletal: Patient is ambulatory without assistive device or brace.[JB1.1] Previous left 2nd toe amputation.[JB1.2]     IMAGING: MRI- Plantar lateral skin ulcer at the level of the fifth metatarsal  head. Devitalized soft tissue thickening distal to the ulcer in the  plantar fifth toe.  2. Nonspecific mild bone marrow edema in the fifth metatarsal head and  proximal phalanx base may be reactive change alone but early  osteomyelitis is not excluded.  3. Probable postoperative changes in the soft tissues of the distal  forefoot more medially.[JB1.1]     WALLACE's[JB1.2] :[JB1.1]  pending[JB1.2]     ASSESSMENT:[JB1.1] 77 yr old diabetic male with diabetic right foot infection, osteomyelitis right foot, PVD,[JB1.2]      PLAN:  Reviewed patient's chart in epic.[JB1.1]  -had long discussion with patient about his right foot and the MRI results.   -debrided the wound at bedside.   - given the extent of the necrotic tissue to the bottom of the foot, Patient would  need almost all the skin debrided off. We would not be able to close the wound even with an transmetatarsal amputation or be able to give him something functional to walk on. Also given his history of PVD and non palpable pulses on exam, I am concerned he would not heal any salvage attempt.  Therefore I am recommending a below knee amputation.   -did put in a vascular referral and discussed this with them. He would likely need his INR reversed or down to 1.2 before surgical intervention but will defer that to vascular.   - Spoke with Tad who verbalizes understanding.[JB1.2]   -recommend non weight bearing right foot.[JB1.3]   -At this point, podiatry will sign off. Please call with further questions or concerns.[JB1.2]       Kathi Nye DPM, Podiatry/Foot and Ankle Surgery    10:10 AM[JB1.1]       Revision History        User Key Date/Time User Provider Type Action    > JB1.3 10/1/2018  4:53 PM Kathi Nye DPM, Podiatry/Foot and Ankle Surgery Physician Addend     JB1.2 10/1/2018  4:51 PM Kathi Nye DPM, Podiatry/Foot and Ankle Surgery Physician Sign     JB1.1 10/1/2018 10:10 AM Kathi Nye DPM, Podiatry/Foot and Ankle Surgery Physician                      Progress Notes - Physician (Notes for yesterday and today)      Progress Notes by Amador Vick MD at 10/16/2018  7:13 AM     Author:  Amador Vick MD Service:  Vascular Surgery Author Type:  Physician    Filed:  10/16/2018  1:00 PM Date of Service:  10/16/2018  7:13 AM Creation Time:  10/16/2018  7:13 AM    Status:  Addendum :  Amador Vick MD (Physician)         [facility]    Vascular Surgery Progress Note    Assessment & Plan   Procedure(s):  AMPUTATE REVISION STUMP LOWER EXTREMITY   -5 Days Post-Op     76 y/o male s/p RLE BKA for non-healing ulcer on right foot. Recovering well  --Will change his RLE dressing today  --Patient pending discharge to acute rehab   --Patient resumed on his home  A/C    Principal Problem:    Osteomyelitis Right Foot -- S/P BKA 10/11/18  Active Problems:    DM type 2 w neuropathy -- Hgb A1C 9.0 on 9/22/18    HTN (hypertension)    CAD -- S/P CABG    Osteomyelitis left 2nd Toe s/p amputaion 1/10/14    CRF (chronic renal failure), stage 3 (moderate) (H)    Chronic systolic heart failure (H)    Atrial fibrillation -- on Warfarin      Min Lela Silva MD    Interval History   No acute events overnight. Patient resting comfortably this morning, sleeping     Physical Exam   Temp: 97.8  F (36.6  C) Temp src: Oral BP: 135/67 Pulse: 67 Heart Rate: 66 Resp: 18 SpO2: 99 % O2 Device: None (Room air)    Vitals:    10/14/18 0600 10/15/18 0621 10/16/18 0515   Weight: 80.2 kg (176 lb 12.9 oz) 80.2 kg (176 lb 12.9 oz) 79.3 kg (174 lb 13.2 oz)     Vital Signs with Ranges  Temp:  [97.7  F (36.5  C)-98.7  F (37.1  C)] 97.8  F (36.6  C)  Pulse:  [67-84] 67  Heart Rate:  [66] 66  Resp:  [16-18] 18  BP: (114-139)/(58-72) 135/67  SpO2:  [94 %-99 %] 99 %  I/O last 3 completed shifts:  In: 1073 [P.O.:1070; I.V.:3]  Out: 1300 [Urine:1300]    PE:   NAD, awake and alert  Chest: S1 S2 present, RRR, no wheezing   Abd: soft, NT, ND, no rebound or guarding  Ex: RLE BKA stump wrapped in ACE bandage, in Rooke Boot     Medications     sodium chloride Stopped (10/15/18 0813)     Warfarin Therapy Reminder          atorvastatin  20 mg Oral Daily     cholecalciferol  1,000 Units Oral Daily     citalopram (celeXA) tablet 10 mg  10 mg Oral Daily     clopidogrel  75 mg Oral Daily     ferrous sulfate  325 mg Oral Daily     furosemide  40 mg Oral Daily     insulin aspart  10 Units Subcutaneous TID w/meals     insulin aspart  1-7 Units Subcutaneous TID AC     insulin aspart  1-5 Units Subcutaneous At Bedtime     insulin glargine  16 Units Subcutaneous At Bedtime     melatonin tablet 5 mg  5 mg Oral At Bedtime     metFORMIN  500 mg Oral BID w/meals     metoprolol succinate  100 mg Oral Daily     miconazole   Topical TID      pantoprazole  40 mg Oral BID AC     polyethylene glycol  17 g Oral BID     sennosides  2 tablet Oral BID     sodium chloride (PF)  3 mL Intracatheter Q8H       Data   CBC RESULTS:   Recent Labs   Lab Test  10/15/18   0819  10/14/18   0757   WBC   --   6.5   RBC   --   2.73*   HGB  8.1*  8.2*   HCT   --   24.2*   MCV   --   89   MCH   --   30.0   MCHC   --   33.9   RDW   --   15.9*   PLT   --   130*[MX1.1]     I agree with the above.  The dressing for the right BKA was taken down earlier today and the stump looks fine.  Ready for discharge to acute rehab unit from the vascular surgical standpoint.  Vascular surgical follow-up will be with me in 4-5 weeks for suture removal.  Utilize Rooke stump protector whenever possible.    Solis Vick MD[TG1.1]     Revision History        User Key Date/Time User Provider Type Action    > TG1.1 10/16/2018  1:00 PM Amador Vick MD Physician Addend     MX1.1 10/16/2018  7:15 AM Sam Silva MD Fellow Sign            Progress Notes by Abdiel Alejandro MD at 10/15/2018  3:17 PM     Author:  Abdiel Alejandro MD Service:  Hospitalist Author Type:  Physician    Filed:  10/15/2018  3:47 PM Date of Service:  10/15/2018  3:17 PM Creation Time:  10/15/2018  3:17 PM    Status:  Addendum :  Abdiel Alejandro MD (Physician)         [facility]    Internal Medicine Hospitalist Progress Note  10/15/2018  I evaluated patient on the above date.    Abdiel Alejandro Jr., MD  [phone] (p)  Text Page      Assessment & Plan   Tad Manning is a 77 year old male history including DM2 with neuropathy, HTN, CAD, CHF, PAD, CKD and atrial fibrillation, who presented 9/30 with R foot wound with concerns for osteomyelitis.    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  Patient transferred from [facility] to [facility]  Hospital 9/30 for further evaluation of non-healing right foot ulcer. Started on vanco and Zosyn on admit. Seen by Vascular Surgery. MRI right foot 10/10 howed plantar lateral skin ulcer at the level of the fifth metatarsal head with devitalized soft tissue thickening distal to the ulcer in the plantar fifth toe; nonspecific mild bone marrow edema in the fifth metatarsal head and proximal phalanx base may be reactive change alone but early osteomyelitis not excluded. US WALLACE 10/1 showed left within normal limits, right suggested moderate arterial insufficiency. Delay in surgery due to cardiac issues (see below). Ultimately underwent R BKA 10/11. Vanco discontinued 10/9. Zosyn changed to Augmentin 10/12.  - Post-op management per Vascular.  - Continue Augmentin (started 10/12) - plan stop after 10/14.    Acute blood loss anemia on chronic anemia.  Baseline hgb around 9-10 range. Iron studies 10/1 suggested ACD +/- iron deficiency. TSH, B12, folate on 10/12 were normal. Received prbc's 10/3, 10/4, 10/8, 10/13.   Recent Labs  Lab 10/15/18  0819 10/14/18  0757 10/13/18  0740 10/12/18  1150 10/11/18  1657 10/11/18  0821   HGB 8.1* 8.2* 7.3* 7.6* 7.8* 8.4*   - Monitor CBC.  - Consider prbc transfusion if hgb </= 7.0 or if significant bleeding with hemodynamic instability or if symptomatic.    Ischemic cardiomyopathy with acute on chronic biventricular systolic CHF exacerbation.  Hypertension (benign essential).  [PTA BP meds/diuretics: furosemide 40 mg daily, metoprolol  mg daily.]  * S/p 3-vessel CABG in 1995. S/p PCI to mLAD 3/2018. Echo 7/2018 showed LVEF 25%.  * Seen by Cardiology this stay. Echo 10/5 showed LVEF 35-45%; RV systolic function mildly decreased. Diuresed with IV Lasix this stay.  * Wt (admit wt 177 lbs 9/30): 176 lbs 10/14 <-- 175 lbs 10/13 <-- 174 lbs <-- 179 lbs.  - Continue atorvastatin, clopidogrel, furosemide, metoprolol XL, PRN NTG.  - Monitor i/o's, daily wts.    DM2 with vascular  "complications.  [PTA: metformin 500 mg BID.]  Hgb A1C 9.0 9/22. Started on Lantus this stay.[GC1.1]  Recent Labs  Lab 10/15/18  1230 10/15/18  0803 10/14/18  2139 10/14/18  1801 10/14/18  1255  10/12/18  0802   GLC  --   --   --   --   --   --  112*   * 157* 138* 153* 112*  < >  --    < > = values in this interval not displayed.[GC1.2]  - Continue Lantus 16U at bedtime.  - Continue aspart 10U TID with meals.  - Continue metformin 500 mg BID.  - Continue ISS.    Acute kidney injury, stage stage 2 on CKD stage 3.  Baseline cr around 1.4 previously this year. Cr 2.23 on admit 9/22. Cr steadily improved this hospitalization.   Recent Labs  Lab 10/12/18  0802   CR 1.17   - Monitor BMP.  - Avoid nephrotoxic medications.    PAD.  * H/o lower extremity stents. H/o left lower extremity bypass 2014  * WALLACE 10/1 showed moderate arterial insufficiency on the R.  - Continue Plavix.     Persistent atrial fibrillation.  Restarted warfarin 10/14.  - Continue metoprolol XL, warfarin.    2nd degree AV Block.   Noted to have abnormal conduction on telemetry 9/22 at [facility]. EKG at that time showed sinus rhythm with 2nd degree AV block and appeared to have been present on previous EKG back in 7/2018. Seen by Cardiology this stay.  - F/u with Cardiology.    BPH.  Tamsulosin was recently discontinued due to recurrent falls.  - Monitor clinically.      GERD.  - Continue[GC1.1] pantoprazole[GC1.3].    Anxiety.  Discontinued Alphatrex recently due to high fall risk.  - Continue citalopram.     Physical deconditioning with h/o mechanical falls due to acute illness/chronic debilitation.  * Admitted from 9/21-9/24 at [facility] for acute on chronic kidney injury and mechanical fall/supratherapeutic INR. He reported that his \"knees give out\".   - Continue PT, OT.     Prophylaxis.  - PCD's, ambulation.    CODE STATUS: FULL.    Dispo.  - Plan TCU possibly 1-2 more days when OK with Vascular.    # Pain Assessment:  Current Pain Score 10/15/2018 "   Patient currently in pain? -   Pain score (0-10) 4   Pain location -   Pain descriptors -   Pt having pain from recent surgery.      Interval History[GC1.1]   Doing relatively well. Working with PT now.[GC1.4]    -Data reviewed today: I reviewed all new labs and imaging over the last 24 hours. I personally reviewed no images or EKG's today.    Physical Exam   Heart Rate: 76, Blood pressure 139/72, pulse 76, temperature 98.1  F (36.7  C), temperature source Oral, resp. rate 16, height 1.829 m (6'), weight 80.2 kg (176 lb 12.9 oz), SpO2 94 %.  Vitals:    10/13/18 0600 10/14/18 0600 10/15/18 0621   Weight: 79.7 kg (175 lb 11.3 oz) 80.2 kg (176 lb 12.9 oz) 80.2 kg (176 lb 12.9 oz)     Vital Signs with Ranges  Temp:  [97.8  F (36.6  C)-98.7  F (37.1  C)] 98.1  F (36.7  C)  Pulse:  [69-76] 76  Heart Rate:  [69-80] 76  Resp:  [16-18] 16  BP: (129-145)/(60-72) 139/72  SpO2:  [94 %-98 %] 94 %  Patient Vitals for the past 24 hrs:   BP Temp Temp src Pulse Heart Rate Resp SpO2 Weight   10/15/18 1202 139/72 98.1  F (36.7  C) Oral 76 - 16 94 % -   10/15/18 0805 129/60 98.7  F (37.1  C) Oral 70 - 16 95 % -   10/15/18 0621 - - - - - - - 80.2 kg (176 lb 12.9 oz)   10/15/18 0554 - - - - - 16 - -   10/15/18 0400 - - - - - 16 - -   10/15/18 0200 - - - - - 16 - -   10/15/18 0000 132/61 97.8  F (36.6  C) Oral - 76 16 96 % -   10/14/18 2153 141/66 - - - - - - -   10/14/18 1924 145/65 98.6  F (37  C) Oral - 80 18 98 % -   10/14/18 1537 142/62 98  F (36.7  C) Oral 69 69 18 98 % -     I/O's Last 24 hours  I/O last 3 completed shifts:  In: 816.17 [P.O.:570; I.V.:246.17]  Out: 1300 [Urine:1275; Drains:25]    Constitutional: Alert, oriented, pleasant.  Respiratory:   Cardiovascular:   GI:   Skin/Integumen:   Other:        Data     Recent Labs  Lab 10/15/18  0819 10/14/18  0757 10/13/18  0740  10/12/18  0802 10/11/18  1657 10/11/18  0821   WBC  --  6.5  --   --   --  9.1  --    HGB 8.1* 8.2* 7.3*  < >  --  7.8* 8.4*   MCV  --  89  --   --   --   90  --    PLT  --  130*  --   --   --  152 137*   INR 1.11 1.19*  --   --   --   --   --    NA  --   --   --   --  137  --   --    POTASSIUM  --   --   --   --  4.1  --  4.1   CHLORIDE  --   --   --   --  103  --   --    CO2  --   --   --   --  28  --   --    BUN  --   --   --   --  19  --   --    CR  --   --   --   --  1.17  --   --    ANIONGAP  --   --   --   --  6  --   --    JUSTINO  --   --   --   --  8.4*  --   --    GLC  --   --   --   --  112*  --   --    < > = values in this interval not displayed.  Recent Labs   Lab Test  10/15/18   1230  10/15/18   0803  10/14/18   2139  10/14/18   1801  10/14/18   1255   10/12/18   0802   10/08/18   0812   10/07/18   1325   10/06/18   1012   10/05/18   0912   GLC   --    --    --    --    --    --   112*   --   171*   --   232*   --   170*   --   161*   BGM  123*  157*  138*  153*  112*   < >   --    < >   --    < >   --    < >   --    < >   --     < > = values in this interval not displayed.         No results found for this or any previous visit (from the past 24 hour(s)).    Medications   All medications were reviewed.    sodium chloride Stopped (10/15/18 0813)     Warfarin Therapy Reminder         atorvastatin  20 mg Oral Daily     cholecalciferol  1,000 Units Oral Daily     citalopram (celeXA) tablet 10 mg  10 mg Oral Daily     clopidogrel  75 mg Oral Daily     ferrous sulfate  325 mg Oral Daily     furosemide  40 mg Oral Daily     insulin aspart  10 Units Subcutaneous TID w/meals     insulin aspart  1-7 Units Subcutaneous TID AC     insulin aspart  1-5 Units Subcutaneous At Bedtime     insulin glargine  16 Units Subcutaneous At Bedtime     melatonin tablet 5 mg  5 mg Oral At Bedtime     metFORMIN  500 mg Oral BID w/meals     metoprolol succinate  100 mg Oral Daily     miconazole   Topical TID     pantoprazole  40 mg Oral BID AC     polyethylene glycol  17 g Oral BID     sennosides  2 tablet Oral BID     sodium chloride (PF)  3 mL Intracatheter Q8H     warfarin  7.5 mg  Oral ONCE at 18:00[GC1.1]          Revision History        User Key Date/Time User Provider Type Action    > GC1.3 10/15/2018  3:47 PM Abdiel Alejandro MD Physician Addend     GC1.4 10/15/2018  3:38 PM Abdiel Alejandro MD Physician Sign     GC1.2 10/15/2018  3:18 PM Abdiel Alejandro MD Physician      GC1.1 10/15/2018  3:17 PM Abdiel Alejandro MD Physician             Progress Notes by Anival Mcgarry MD at 10/15/2018  8:01 AM     Author:  Anival Mcgarry MD Service:  Surgery Author Type:  Resident    Filed:  10/15/2018  8:03 AM Date of Service:  10/15/2018  8:01 AM Creation Time:  10/15/2018  8:01 AM    Status:  Signed :  Anival Mcgarry MD (Resident)         VASCULAR SURGERY PROGRESS NOTE    S:  Pain well-controlled on PCA.  Working with PT.  Having BMs.    O:  /61 (BP Location: Right arm)  Pulse 69  Temp 97.8  F (36.6  C) (Oral)  Resp 16  Ht 1.829 m (6')  Wt 80.2 kg (176 lb 12.9 oz)  SpO2 96%  BMI 23.98 kg/m2  I/O: drain 30 yesterday.  General: A&O, NAD.  Extremities:  Dressings c/d/i.  Drain with small amount of serosang drainage.      Blood glucose 112-153 over the last day.    A:  POD#4 s/p revision and closure of BKA.  Recovering well.    R:  1.  PCA discontinued.  2.  Drain removed.  3.  Planning to take down dressings tomorrow.    Anival Mcgarry MD  General surgery resident  [phone][BP1.1]       Revision History        User Key Date/Time User Provider Type Action    > BP1.1 10/15/2018  8:03 AM Anival Mcgarry MD Resident Sign                  Procedure Notes     No notes of this type exist for this encounter.         Progress Notes - Therapies (Notes from 10/13/18 through 10/16/18)      Progress Notes by Pinky Herr OT at 10/15/2018  8:49 AM     Author:  Pinky Herr OT Service:  Acute IP Rehab Author Type:  Occupational Therapist    Filed:  10/15/2018  3:55 PM Date of Service:  10/15/2018  8:49 AM Creation Time:   10/15/2018  3:54 PM    Status:  Signed :  Pinky Herr OT (Occupational Therapist)          10/15/18 9764   Quick Adds   Type of Visit Initial Occupational Therapy Evaluation   Living Environment   Lives With significant other  (lives with roommate Patrice )   Living Arrangements apartment   Home Accessibility no concerns  (elevator access)   Transportation Available car;family or friend will provide   Living Environment Comment Pt lives with significant other, pt has a step in shower and uses a shower chair.  Pt has transport WC but his brother is using currently.    Self-Care   Dominant Hand right   Usual Activity Tolerance good   Equipment Currently Used at Home walker, standard   Functional Level Prior   Ambulation 1-->assistive equipment   Transferring 1-->assistive equipment   Toileting 1-->assistive equipment   Bathing 1-->assistive equipment  (shower chair)   Dressing 0-->independent   Eating 0-->independent   Communication 0-->understands/communicates without difficulty   Swallowing 0-->swallows foods/liquids without difficulty   Cognition 0 - no cognition issues reported   Fall history within last six months yes   Number of times patient has fallen within last six months 4   Which of the above functional risks had a recent onset or change? ambulation;transferring   Prior Functional Level Comment reports lives with significant other who works during the day, [facility], was amb with 4WW since d/c from [facility] few years ago   General Information   Onset of Illness/Injury or Date of Surgery - Date 09/30/18   Referring Physician Anival Mcgarry MD   Patient/Family Goals Statement To return to [facility]   Additional Occupational Profile Info/Pertinent History of Current Problem Pt is a 78 yo male with R foot osteomyelitis with sepsis, related to diabetic foot wound and arterial insufficiency - s/p open distal R lower leg amputation with wound VAC 10/8 and 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    Precautions/Limitations fall precautions   Weight-Bearing Status - RLE nonweight-bearing   Cognitive Status Examination   Orientation orientation to person, place and time   Level of Consciousness alert   Able to Follow Commands WNL/WFL   Visual Perception   Visual Perception Wears glasses   Sensory Examination   Sensory Comments pt has baseline PN in hands, no new complaints   Pain Assessment   Patient Currently in Pain No   Range of Motion (ROM)   ROM Quick Adds No deficits were identified   Strength   Strength Comments LUE 5/5, RUE 4/5   Hand Strength   Hand Strength Comments R  stonger than L both WNL   Coordination   Coordination Comments Pt reports some difficutly with buttoning or fastening due to reduced fine motor co/ PN   Transfer Skills   Transfer Comments A of 2   Transfer Skill: Bed to Chair/Chair to Bed   Level of [place]: Bed to Chair maximum assist (25% patients effort)   Physical Assist/Nonphysical Assist: Bed to Chair 2 persons   Weight-Bearing Restrictions nonweight-bearing   Assistive Device - Transfer Skill Bed to Chair Chair to Bed Rehab Eval other (see comments)   Transfer Skill: Sit to Stand   Level of [place]: Sit/Stand moderate assist (50% patients effort)   Physical Assist/Nonphysical Assist: Sit/Stand 2 persons   Transfer Skill: Sit to Stand nonweight-bearing   Assistive Device for Transfer: Sit/Stand rolling walker   Transfer Skill: Toilet Transfer   Level of [place]: Toilet dependent (less than 25% patients effort)   Toilet Transfer Skill Comments pt is currently using urinal and bedpan, does not wish to trial at time of eval   Balance   Balance Comments impaired standing/dynamic   Upper Body Dressing   Level of [place]: Dress Upper Body stand-by assist   Lower Body Dressing   Level of [place]: Dress Lower Body moderate assist (50% patients effort)   Toileting   Level of [place]: Toilet maximum assist (25% patients effort)   Grooming   Level of  "[place]: Grooming stand-by assist   Eating/Self Feeding   Level of [place]: Eating independent   Instrumental Activities of Daily Living (IADL)   Previous Responsibilities meal prep;housekeeping;medication management;finances;driving   Activities of Daily Living Analysis   Impairments Contributing to Impaired Activities of Daily Living balance impaired;strength decreased;post surgical precautions   General Therapy Interventions   Planned Therapy Interventions ADL retraining;bed mobility training;transfer training;progressive activity/exercise   Clinical Impression   Criteria for Skilled Therapeutic Interventions Met yes, treatment indicated   OT Diagnosis reduced IND in ADLs   Influenced by the following impairments balance impaired;strength decreased;post surgical precautions   Assessment of Occupational Performance 3-5 Performance Deficits   Identified Performance Deficits bed mobility, transfers, toileting, bathing, dressing   Clinical Decision Making (Complexity) Moderate complexity   Therapy Frequency daily   Predicted Duration of Therapy Intervention (days/wks) daily   Anticipated Discharge Disposition Acute Rehabilitation Facility   Risks and Benefits of Treatment have been explained. Yes   Patient, Family & other staff in agreement with plan of care Yes   [facility] TM \"6 Clicks\"   2016, Trustees of [facility], under license to [employer].  All rights reserved.   6 Clicks Short Forms Basic Mobility Inpatient Short Form;Daily Activity Inpatient Short Form   [facility]  \"6 Clicks\" Daily Activity Inpatient Short Form   1. Putting on and taking off regular lower body clothing? 2 - A Lot   2. Bathing (including washing, rinsing, drying)? 2 - A Lot   3. Toileting, which includes using toilet, bedpan or urinal? 1 - Total   4. Putting on and taking off regular upper body clothing? 3 - A Little   5. Taking care of personal grooming such as brushing teeth? 3 - A Little "   6. Eating meals? 4 - None   Daily Activity Raw Score (Score out of 24.Lower scores equate to lower levels of function) 15   Total Evaluation Time   Total Evaluation Time (Minutes) 10[ES1.1]        Revision History        User Key Date/Time User Provider Type Action    > ES1.1 10/15/2018  3:55 PM Pinky Herr, OT Occupational Therapist Sign                                                      INTERAGENCY TRANSFER FORM - LAB / IMAGING / EKG / EMG RESULTS   9/30/2018                       [place] [address] SURGICAL SPECIALITIES: [phone]            Unresulted Labs (24h ago through future)    Start       Ordered    10/15/18 0600  INR  (warfarin (COUMADIN) Pharmacy Consult-INITIAL ORDER)  DAILY,   Routine      10/14/18 1316    10/14/18 0600  INR  (warfarin (COUMADIN) Pharmacy Consult-INITIAL ORDER)  DAILY,   Routine      10/13/18 1657    10/05/18 0600  Platelet count  EVERY THREE DAYS,   Routine     Comments:  If no result is listed, this lab has not been done the past 365 days. LATEST LAB RESULT: Platelet Count (10e9/L)       Date                     Value                 10/01/2018               97 (L)           ----------    10/02/18 1016         Lab Results - 3 Days      Glucose by meter [[id number]] (Abnormal)  Resulted: 10/16/18 1157, Result status: Final result    Ordering provider: Trip Ruby DO  10/16/18 1145 Resulting lab: POINT OF CARE TEST, GLUCOSE    Specimen Information    Type Source Collected On     10/16/18 1145          Components       Value Reference Range Flag Lab   Glucose 108 70 - 99 mg/dL H 170            Glucose by meter [[id number]] (Abnormal)  Resulted: 10/16/18 0804, Result status: Final result    Ordering provider: Trip Ruby DO  10/16/18 0749 Resulting lab: POINT OF CARE TEST, GLUCOSE    Specimen Information    Type Source Collected On     10/16/18 0749          Components       Value Reference Range Flag Lab   Glucose 156 70 - 99 mg/dL H 170             INR [[id number]]  Resulted: 10/16/18 0755, Result status: Final result    Ordering provider: Abdiel Alejandro MD  10/16/18 0000 Resulting lab: [facility]    Specimen Information    Type Source Collected On   Blood  10/16/18 0724          Components       Value Reference Range Flag Lab   INR 1.14 0.86 - 1.14  FrStHsLb            Glucose by meter [[id number]] (Abnormal)  Resulted: 10/16/18 0114, Result status: Final result    Ordering provider: Trip Ruby,   10/16/18 0100 Resulting lab: POINT OF CARE TEST, GLUCOSE    Specimen Information    Type Source Collected On     10/16/18 0100          Components       Value Reference Range Flag Lab   Glucose 113 70 - 99 mg/dL H 170            Glucose by meter [[id number]] (Abnormal)  Resulted: 10/15/18 2049, Result status: Final result    Ordering provider: Trip Ruby,   10/15/18 2035 Resulting lab: POINT OF CARE TEST, GLUCOSE    Specimen Information    Type Source Collected On     10/15/18 2035          Components       Value Reference Range Flag Lab   Glucose 119 70 - 99 mg/dL H 170            Glucose by meter [[id number]] (Abnormal)  Resulted: 10/15/18 1749, Result status: Final result    Ordering provider: Trip Ruby,   10/15/18 1725 Resulting lab: POINT OF CARE TEST, GLUCOSE    Specimen Information    Type Source Collected On     10/15/18 1725          Components       Value Reference Range Flag Lab   Glucose 125 70 - 99 mg/dL H 170            Glucose by meter [[id number]] (Abnormal)  Resulted: 10/15/18 1242, Result status: Final result    Ordering provider: Trip Ruby,   10/15/18 1230 Resulting lab: POINT OF CARE TEST, GLUCOSE    Specimen Information    Type Source Collected On     10/15/18 1230          Components       Value Reference Range Flag Lab   Glucose 123 70 - 99 mg/dL H 170            INR [[id number]]  Resulted: 10/15/18 0909, Result status: Final result    Ordering provider: Flavia  Abdiel Worthington MD  10/15/18 0000 Resulting lab: [facility]    Specimen Information    Type Source Collected On   Blood  10/15/18 0819          Components       Value Reference Range Flag Lab   INR 1.11 0.86 - 1.14  FrStHsLb            Hemoglobin [[id number]] (Abnormal)  Resulted: 10/15/18 0840, Result status: Final result    Ordering provider: Abdiel Alejandro MD  10/15/18 0000 Resulting lab: [facility]    Specimen Information    Type Source Collected On   Blood  10/15/18 0819          Components       Value Reference Range Flag Lab   Hemoglobin 8.1 13.3 - 17.7 g/dL L FrStHsLb            Glucose by meter [[id number]] (Abnormal)  Resulted: 10/15/18 0822, Result status: Final result    Ordering provider: Trip Ruby,   10/15/18 0803 Resulting lab: POINT OF CARE TEST, GLUCOSE    Specimen Information    Type Source Collected On     10/15/18 0803          Components       Value Reference Range Flag Lab   Glucose 157 70 - 99 mg/dL H 170            Glucose by meter [[id number]] (Abnormal)  Resulted: 10/14/18 2154, Result status: Final result    Ordering provider: Trip Ruby,   10/14/18 2139 Resulting lab: POINT OF CARE TEST, GLUCOSE    Specimen Information    Type Source Collected On     10/14/18 2139          Components       Value Reference Range Flag Lab   Glucose 138 70 - 99 mg/dL H 170            Glucose by meter [[id number]] (Abnormal)  Resulted: 10/14/18 1813, Result status: Final result    Ordering provider: Trip Ruby,   10/14/18 1801 Resulting lab: POINT OF CARE TEST, GLUCOSE    Specimen Information    Type Source Collected On     10/14/18 1801          Components       Value Reference Range Flag Lab   Glucose 153 70 - 99 mg/dL H 170            Glucose by meter [[id number]] (Abnormal)  Resulted: 10/14/18 1307, Result status: Final result    Ordering provider: Trip Ruby DO  10/14/18 1255 Resulting lab: POINT OF CARE TEST,  GLUCOSE    Specimen Information    Type Source Collected On     10/14/18 1255          Components       Value Reference Range Flag Lab   Glucose 112 70 - 99 mg/dL H 170            Glucose by meter [[id number]] (Abnormal)  Resulted: 10/14/18 0858, Result status: Final result    Ordering provider: Trip Ruby DO  10/14/18 0846 Resulting lab: POINT OF CARE TEST, GLUCOSE    Specimen Information    Type Source Collected On     10/14/18 0846          Components       Value Reference Range Flag Lab   Glucose 144 70 - 99 mg/dL H 170            INR [[id number]] (Abnormal)  Resulted: 10/14/18 0821, Result status: Final result    Ordering provider: Abdiel Alejandro MD  10/14/18 0000 Resulting lab: [facility]    Specimen Information    Type Source Collected On   Blood  10/14/18 0757          Components       Value Reference Range Flag Lab   INR 1.19 0.86 - 1.14 H FrStHsLb            CBC with platelets [[id number]] (Abnormal)  Resulted: 10/14/18 0806, Result status: Final result    Ordering provider: Amador Vick MD  10/14/18 0000 Resulting lab: [facility]    Specimen Information    Type Source Collected On   Blood  10/14/18 0757          Components       Value Reference Range Flag Lab   WBC 6.5 4.0 - 11.0 10e9/L  FrStHsLb   RBC Count 2.73 4.4 - 5.9 10e12/L L FrStHsLb   Hemoglobin 8.2 13.3 - 17.7 g/dL L FrStHsLb   Hematocrit 24.2 40.0 - 53.0 % L FrStHsLb   MCV 89 78 - 100 fl  FrStHsLb   MCH 30.0 26.5 - 33.0 pg  FrStHsLb   MCHC 33.9 31.5 - 36.5 g/dL  FrStHsLb   RDW 15.9 10.0 - 15.0 % H FrStHsLb   Platelet Count 130 150 - 450 10e9/L L FrStHsLb            Glucose by meter [[id number]] (Abnormal)  Resulted: 10/14/18 0423, Result status: Final result    Ordering provider: Trip Ruby DO  10/14/18 0410 Resulting lab: POINT OF CARE TEST, GLUCOSE    Specimen Information    Type Source Collected On     10/14/18 0410          Components       Value Reference Range Flag  Lab   Glucose 125 70 - 99 mg/dL H 170            Glucose by meter [[id number]] (Abnormal)  Resulted: 10/13/18 2121, Result status: Final result    Ordering provider: Trip Ruby,   10/13/18 2109 Resulting lab: POINT OF CARE TEST, GLUCOSE    Specimen Information    Type Source Collected On     10/13/18 2109          Components       Value Reference Range Flag Lab   Glucose 153 70 - 99 mg/dL H 170            Glucose by meter [[id number]] (Abnormal)  Resulted: 10/13/18 1753, Result status: Final result    Ordering provider: Trip Ruby,   10/13/18 1741 Resulting lab: POINT OF CARE TEST, GLUCOSE    Specimen Information    Type Source Collected On     10/13/18 1741          Components       Value Reference Range Flag Lab   Glucose 114 70 - 99 mg/dL H 170            Glucose by meter [[id number]] (Abnormal)  Resulted: 10/13/18 1230, Result status: Final result    Ordering provider: Trip Ruby,   10/13/18 1218 Resulting lab: POINT OF CARE TEST, GLUCOSE    Specimen Information    Type Source Collected On     10/13/18 1218          Components       Value Reference Range Flag Lab   Glucose 167 70 - 99 mg/dL H 170            Glucose by meter [[id number]] (Abnormal)  Resulted: 10/13/18 0821, Result status: Final result    Ordering provider: Trip Ruby,   10/13/18 0810 Resulting lab: POINT OF CARE TEST, GLUCOSE    Specimen Information    Type Source Collected On     10/13/18 0810          Components       Value Reference Range Flag Lab   Glucose 148 70 - 99 mg/dL H 170            Hemoglobin [[id number]] (Abnormal)  Resulted: 10/13/18 0756, Result status: Final result    Ordering provider: Mikael Kendall MD  10/13/18 0000 Resulting lab: [facility]    Specimen Information    Type Source Collected On   Blood  10/13/18 0740          Components       Value Reference Range Flag Lab   Hemoglobin 7.3 13.3 - 17.7 g/dL L FrStHsLb            Testing Performed By      Lab - Abbreviation Name Director Address Valid Date Range    14 - FrStHsLb [facility] Unknown [address] [place] [address] 05/08/15 1057 - Present    170 - Unknown POINT OF CARE TEST, GLUCOSE Unknown Unknown 10/31/11 1114 - Present               ECG/EMG Results      Echocardiogram Complete [[id number]]  Resulted: 10/05/18 1320, Result status: In process    Ordering provider: Mary Cheatham APRN CNP  10/04/18 163 Performed: 10/05/18 1320 - 10/05/18 1320    Resulting lab: [facility]             ECHO LIMITED WITH OPTISON [[id number]]  Resulted: 10/05/18 132, Result status: Edited Result - FINAL    Ordering provider: Mary Cheatham APRN CNP  10/04/18 163 Resulted by: Kendall Reynolds MD    Performed: 10/05/18 1320 - 10/05/18 1445 Resulting lab: RADIOLOGY RESULTS    Narrative:       [id number]  [id number]  [id number]  [id number]^CHAPARRITA^MARY^VINCENT           [facility]  Echocardiography Laboratory  [address] [facility] [place] [address]        Name: PETER BUTLER  MRN: [id number]  : 1940  Study Date: 10/05/2018 01:24 PM  Age: 77 yrs  Gender: Male  Patient Location: [facility]  Reason For Study: Cardiomyopathy  Ordering Physician: MARY [facility]  Referring Physician: Gene Guevara  Performed By: Dale Toledo RDCS     BSA: 2.0 m2  Height: 72 in  Weight: 177 lb  _____________________________________________________________________________  __        Procedure  Limited Portable Echo Adult. Contrast Optison.  _____________________________________________________________________________  __        Interpretation Summary     The visual ejection fraction is estimated at 35-40%.  There is mild-moderate global hypokinesia of the left ventricle.  Mildly decreased right ventricular systolic function     In comparison with the previous study the LVEF has improved slightly.  _____________________________________________________________________________  __         Left Ventricle  The left ventricle is normal in size. The visual ejection fraction is  estimated at 35-40%. There is mild-moderate global hypokinesia of the left  ventricle.     Right Ventricle  Mildly decreased right ventricular systolic function.     Atria  The left atrium is mild to moderately dilated. The right atrium is mild to  moderately dilated. There is no atrial shunt seen.        Mitral Valve  The mitral valve leaflets are mildly thickened. There is trace mitral  regurgitation.     Tricuspid Valve  The tricuspid valve is not well visualized, but is grossly normal. There is  trace tricuspid regurgitation. Right ventricular systolic pressure could not  be approximated due to inadequate tricuspid regurgitation.     Aortic Valve  There is moderate trileaflet aortic sclerosis. There is trace aortic  regurgitation. No hemodynamically significant valvular aortic stenosis.     Pulmonic Valve  The pulmonic valve is not well seen, but is grossly normal. There is mild (1+)  pulmonic valvular regurgitation.     Vessels  Borderline aortic root dilatation. Normal size ascending aorta.     Pericardium  There is no pericardial effusion.        Rhythm  The rhythm was undetermined.  _____________________________________________________________________________  __                                Report approved by: Kendall Cook 10/05/2018 02:58 PM                    _____________________________________________________________________________  __       1    Type Source Collected On     10/05/18 1324          View Image (below)              Encounter-Level Documents:     There are no encounter-level documents.      Order-Level Documents:     There are no order-level documents.

## 2018-09-30 NOTE — IP AVS SNAPSHOT
` [place] [address] [facility]: [phone]            Medication Administration Report for Tad Manning as of 10/16/18 1858   Legend:    Given Hold Not Given Due Canceled Entry Other Actions    Time Time (Time) Time  Time-Action       Inactive    Active    Linked        Medications 10/10/18 10/11/18 10/12/18 10/13/18 10/14/18 10/15/18 10/16/18    acetaminophen (TYLENOL) tablet 650 mg  Dose: 650 mg  Freq: EVERY 4 HOURS PRN Route: PO  PRN Reason: mild pain  Start: 09/30/18 2203   Admin Instructions: Alternate ibuprofen (if ordered) with acetaminophen.  Maximum acetaminophen dose from all sources = 75 mg/kg/day not to exceed 4 grams/day.    Admin. Amount: 2 tablet (2 × 325 mg tablet)  Last Admin: 10/11/18 1957  Dispense Loc:  ADS 33A      0941-Auto Hold       1552-Unhold       1957 (650 mg)-Given                atorvastatin (LIPITOR) tablet 20 mg  Dose: 20 mg  Freq: DAILY Route: PO  Start: 10/01/18 0900   Admin. Amount: 1 tablet (1 × 20 mg tablet)  Last Admin: 10/16/18 0843  Dispense Loc:  ADS 33A     0832 (20 mg)-Given        (0840)-Not Given       0941-Auto Hold       1552-Unhold        0845 (20 mg)-Given        0912 (20 mg)-Given        0823 (20 mg)-Given        0810 (20 mg)-Given        0843 (20 mg)-Given           benzocaine-menthol (CHLORASEPTIC) 6-10 MG lozenge 1 lozenge  Dose: 1 lozenge  Freq: EVERY 1 HOUR PRN Route: BU  PRN Reason: sore throat  PRN Comment: dry/sore throat without fever  Start: 10/10/18 0132   Admin. Amount: 1 lozenge  Last Admin: 10/14/18 0648  Dispense Loc:  ADS 33A      0942-Auto Hold       1552-Unhold          0648 (1 lozenge)-Given             bisacodyl (DULCOLAX) Suppository 10 mg  Dose: 10 mg  Freq: DAILY PRN Route: RE  PRN Reason: constipation  Start: 10/03/18 1118   Admin. Amount: 1 suppository (1 × 10 mg suppository)  Last Admin: 10/03/18 1333  Dispense Loc:  ADS 33A      0942-Auto Hold       1552-Unhold                cholecalciferol (vitamin  D3) tablet 1,000 Units  Dose: 1,000 Units  Freq: DAILY Route: PO  Start: 10/01/18 0900   Admin. Amount: 1 tablet (1 × 1,000 Units tablet)  Last Admin: 10/16/18 0843  Dispense Loc:  ADS 33A     0832 (1,000 Units)-Given        (0840)-Not Given       0941-Auto Hold       1552-Unhold        0846 (1,000 Units)-Given        0913 (1,000 Units)-Given        0823 (1,000 Units)-Given        0810 (1,000 Units)-Given        0843 (1,000 Units)-Given           citalopram (celeXA) tablet 10 mg  Dose: 10 mg  Freq: DAILY Route: PO  Start: 10/01/18 0900   Admin. Amount: 1 tablet (1 × 10 mg tablet)  Last Admin: 10/16/18 0843  Dispense Loc:  ADS 33A     0832 (10 mg)-Given        (0841)-Not Given       0941-Auto Hold       1552-Unhold        0846 (10 mg)-Given        0912 (10 mg)-Given        0823 (10 mg)-Given        0810 (10 mg)-Given        0843 (10 mg)-Given           clopidogrel (PLAVIX) tablet 75 mg  Dose: 75 mg  Freq: DAILY Route: PO  Start: 10/06/18 1615   Admin. Amount: 1 tablet (1 × 75 mg tablet)  Last Admin: 10/16/18 0843  Dispense Loc:  ADS 33A     0832 (75 mg)-Given        (0841)-Not Given       0942-Auto Hold       1552-Unhold        0845 (75 mg)-Given        0912 (75 mg)-Given        0823 (75 mg)-Given        0810 (75 mg)-Given        0843 (75 mg)-Given           ferrous sulfate (IRON) tablet 325 mg  Dose: 325 mg  Freq: DAILY Route: PO  Start: 10/06/18 0900   Admin Instructions: Absorbed best on an empty stomach. If stomach upset occurs, can take with meals.    Admin. Amount: 1 tablet (1 × 325 mg tablet)  Last Admin: 10/16/18 0843  Dispense Loc:  ADS 33A     0832 (325 mg)-Given        (0841)-Not Given       0942-Auto Hold       1552-Unhold        0845 (325 mg)-Given        0912 (325 mg)-Given        0823 (325 mg)-Given        0810 (325 mg)-Given        0843 (325 mg)-Given           furosemide (LASIX) tablet 40 mg  Dose: 40 mg  Freq: DAILY Route: PO  Start: 10/12/18 0800   Admin. Amount: 1 tablet (1 × 40 mg  tablet)  Last Admin: 10/16/18 0843  Dispense Loc:  ADS 33A       0846 (40 mg)-Given        0912 (40 mg)-Given        0823 (40 mg)-Given        0810 (40 mg)-Given        0843 (40 mg)-Given           glucose gel 15-30 g  Dose: 15-30 g  Freq: EVERY 15 MIN PRN Route: PO  PRN Reason: low blood sugar  Start: 09/30/18 2232   Admin Instructions: Give 15 g for BG 51 to 69 mg/dL IF patient is conscious and able to swallow. Give 30 g for BG less than or equal to 50 mg/dL IF patient is conscious and able to swallow. Do NOT give glucose gel via enteral tube.  IF patient has enteral tube: give apple juice 120 mL (4 oz or 15 g of CHO) via enteral tube for BG 51 to 69 mg/dL.  Give apple juice 240 mL (8 oz or 30 g of CHO) via enteral tube for BG less than or equal to 50 mg/dL.    ~Oral gel is preferable for conscious and able to swallow patient.   ~IF gel unavailable or patient refuses may provide apple juice 120 mL (4 oz or 15 g of CHO). Document juice on I and O flowsheet.    Admin. Amount: 15-30 g  Dispense Loc:  ADS 33A  Volume: 93.75 mL      0941-Auto Hold       1552-Unhold               Or  dextrose 50 % injection 25-50 mL  Dose: 25-50 mL  Freq: EVERY 15 MIN PRN Route: IV  PRN Reason: low blood sugar  Start: 09/30/18 2232   Admin Instructions: Use if have IV access, BG less than 70 mg/dL and meet dose criteria below:  Dose if conscious and alert (or disorientated) and NPO = 25 mL  Dose if unconscious / not alert = 50 mL  Vesicant. For ordered doses up to 25 g, give IV Push undiluted. Give each 5g over 1 minute.    Admin. Amount: 25-50 mL  Dispense Loc: [facility] ADS 33A  Infused Over: 1-5 Minutes  Volume: 50 mL      0941-Auto Hold       1552-Unhold               Or  glucagon injection 1 mg  Dose: 1 mg  Freq: EVERY 15 MIN PRN Route: SC  PRN Reason: low blood sugar  PRN Comment: May repeat x 1 only  Start: 09/30/18 2232   Admin Instructions: May give SQ or IM. ONLY use glucagon IF patient has NO IV access AND is UNABLE to swallow  AND blood glucose is LESS than or EQUAL to 50 mg/dL.  If ordered IV, give IV Push over 1 minute. Reconstitute with 1mL sterile water.    Admin. Amount: 1 mg  Dispense Loc: [facility] 33A      0941-Auto Hold       1552-Unhold                HYDROmorphone (PF) (DILAUDID) injection 0.2-0.4 mg  Dose: 0.2-0.4 mg  Freq: EVERY 4 HOURS PRN Route: IV  PRN Reason: moderate to severe pain  Start: 10/08/18 1722   Admin Instructions: For ordered IV doses 0.1-4 mg give IV Push undiluted. Administer each 2mg over 2-5 minutes.    Admin. Amount: 0.2-0.4 mg  Last Admin: 10/11/18 1627  Dispense Loc: [facility] 33A   Current Line: Peripheral IV 10/11/18 Left Lower forearm     0942-Auto Hold       (1418)-Not Given       (1428)-Not Given       1552-Unhold       1627 (0.4 mg)-Given                ibuprofen (ADVIL/MOTRIN) tablet 600 mg  Dose: 600 mg  Freq: EVERY 8 HOURS PRN Route: PO  PRN Reason: moderate pain  Start: 10/11/18 2351   Admin. Amount: 1 tablet (1 × 600 mg tablet)  Last Admin: 10/15/18 1607  Dispense Loc: [place] [id number]       0220 (600 mg)-Given          1607 (600 mg)-Given            insulin aspart (NovoLOG) inj (RAPID ACTING)  Dose: 10 Units  Freq: 3 TIMES DAILY WITH MEALS Route: SC  Start: 10/09/18 1800   Admin Instructions: If given at mealtime, administer within 30 minutes of start of meal    Admin. Amount: 10 Units  Last Admin: 10/16/18 1336  Dispense Loc: Contact Rx for dose  Volume: 3 mL     0839 (10 Units)-Given       1236 (10 Units)-Given       1808 (10 Units)-Given        (0841)-Not Given [C]       0942-Auto Hold       1200 Auto Hold-Automatically Held       1552-Unhold       (1755)-Not Given [C]        0923 (10 Units)-Given       (1347)-Not Given       1848 (10 Units)-Given        0918 (10 Units)-Given       1443 (10 Units)-Given       1830 (10 Units)-Given        0906 (10 Units)-Given       1334 (10 Units)-Given       1816 (10 Units)-Given        0905 (10 Units)-Given       1256 (10 Units)-Given       1753 (10  Units)-Given        0930 (10 Units)-Given       1336 (10 Units)-Given       [ ] 1800           insulin aspart (NovoLOG) inj (RAPID ACTING)  Dose: 1-5 Units  Freq: AT BEDTIME Route: SC  Start: 09/30/18 2245   Admin Instructions: MEDIUM INSULIN RESISTANCE DOSING    Do Not give Bedtime Correction Insulin if BG less than  200.   For  - 249 give 1 units.   For  - 299 give 2 units.   For  - 349 give 3 units.   For  -399 give 4 units.   For BG greater than or equal to 400 give 5 units.  Notify provider if glucose greater than or equal to 350 mg/dL after administration of correction dose.  If given at mealtime, administer within 30 minutes of start of meal    Admin. Amount: 1-5 Units  Last Admin: 10/08/18 2303  Dispense Loc: Contact Rx for dose  Volume: 3 mL     (2108)-Not Given        0942-Auto Hold       1552-Unhold       (2132)-Not Given        (2109)-Not Given        (2145)-Not Given        (2142)-Not Given [C]         (0101)-Not Given [C]       [ ] 2200           insulin aspart (NovoLOG) inj (RAPID ACTING)  Dose: 1-7 Units  Freq: 3 TIMES DAILY BEFORE MEALS Route: SC  Start: 10/01/18 0730   Admin Instructions: Correction Scale - MEDIUM INSULIN RESISTANCE DOSING     Do Not give Correction Insulin if Pre-Meal BG less than 140.   For Pre-Meal  - 189 give 1 unit.   For Pre-Meal  - 239 give 2 units.   For Pre-Meal  - 289 give 3 units.   For Pre-Meal  - 339 give 4 units.   For Pre-Meal - 399 give 5 units.   For Pre-Meal -449 give 6 units  For Pre-Meal BG greater than or equal to 450 give 7 units.   To be given with prandial insulin, and based on pre-meal blood glucose.    Notify provider if glucose greater than or equal to 350 mg/dL after administration of correction dose.  If given at mealtime, administer within 30 minutes of start of meal    Admin. Amount: 1-7 Units  Last Admin: 10/16/18 0930  Dispense Loc: Contact Rx for dose  Volume: 3 mL     0839 (1  Units)-Given       (1141)-Not Given [C]       1808 (1 Units)-Given        (0839)-Not Given [C]       0941-Auto Hold       1200 Auto Hold-Automatically Held       1552-Unhold       1752 (2 Units)-Given        (0919)-Not Given [C]       (1347)-Not Given       (1756)-Not Given        0919 (1 Units)-Given       1444 (1 Units)-Given       (1755)-Not Given        0906 (1 Units)-Given       (1312)-Not Given       1816 (1 Units)-Given        0905 (1 Units)-Given       (1238)-Not Given       (1757)-Not Given [C]        0930 (1 Units)-Given [C]       (1216)-Not Given [C]       [ ] 1700           insulin glargine (LANTUS) injection 16 Units  Dose: 16 Units  Freq: AT BEDTIME Route: SC  Start: 10/09/18 2200   Admin. Amount: 16 Units  Last Admin: 10/15/18 2141  Dispense Loc:  Main Pharmacy  Volume: 0.16 mL     (2200)-Not Given        0942-Auto Hold       1552-Unhold       2132 (16 Units)-Given        2125 (16 Units)-Given        2236 (16 Units)-Given        2155 (16 Units)-Given        2141 (16 Units)-Given        [ ] 2200           melatonin tablet 5 mg  Dose: 5 mg  Freq: AT BEDTIME Route: PO  Start: 09/30/18 2300   Admin. Amount: 1 tablet (1 × 5 mg tablet)  Last Admin: 10/15/18 2141  Dispense Loc:  ADS 33A     2200 (5 mg)-Given        0941-Auto Hold       1552-Unhold       2134 (5 mg)-Given        2124 (5 mg)-Given        2236 (5 mg)-Given        2155 (5 mg)-Given        2141 (5 mg)-Given        [ ] 2200           metFORMIN (GLUCOPHAGE) tablet 500 mg  Dose: 500 mg  Freq: 2 TIMES DAILY WITH MEALS Route: PO  Start: 10/13/18 1800   Admin Instructions: If the patient receives intravenous, iodinated contrast and patient GFR is greater than 60 mL/min/1.7m2, continue metformin.  Contact provider for 'hold' or 'no hold' instructions if no GFR or if GFR is less than 60 mL/min/1.7m2.    Admin. Amount: 1 tablet (1 × 500 mg tablet)  Last Admin: 10/16/18 0843  Dispense Loc:  ADS 33A        1815 (500 mg)-Given        0823 (500  mg)-Given       1815 (500 mg)-Given        0810 (500 mg)-Given       1753 (500 mg)-Given        0843 (500 mg)-Given       [ ] 1800           metoprolol succinate (TOPROL-XL) 24 hr tablet 100 mg  Dose: 100 mg  Freq: DAILY Route: PO  Start: 09/30/18 2330   Admin Instructions: DO NOT CRUSH. Tablet may be split in half along score line.    Admin. Amount: 1 tablet (1 × 100 mg tablet)  Last Admin: 10/15/18 2043  Dispense Loc:  ADS 33A     2014 (100 mg)-Given        0942-Auto Hold       1552-Unhold       2109 (100 mg)-Given        2124 (100 mg)-Given        2019 (100 mg)-Given        2155 (100 mg)-Given        2043 (100 mg)-Given        [ ] 2100           miconazole (MICATIN; MICRO GUARD) 2 % powder  Freq: 3 TIMES DAILY Route: Top  Start: 10/01/18 1715   Admin Instructions: Apply to affected bilateral groin and buttocks TID and prn    Last Admin: 10/16/18 0847  Dispense Loc:  Main Pharmacy     0833 ( )-Given       1709 ( )-Given       2201 ( )-Given               0942-Auto Hold       1552-Unhold              2119 ( )-Given        0926 ( )-Given       1647 ( )-Given       2126 ( )-Given        0921 ( )-Given       (1829)-Not Given       2237 ( )-Given        0825 ( )-Given       (1845)-Not Given       2157 ( )-Given        0906 ( )-Given       1649 ( )-Given [C]        (0044)-Not Given [C]       0847 ( )-Given       [ ] 1600       [ ] 2200           naloxone (NARCAN) injection 0.1-0.4 mg  Dose: 0.1-0.4 mg  Freq: EVERY 2 MIN PRN Route: IV  PRN Reason: opioid reversal  Start: 10/11/18 2342   Admin Instructions: For respiratory rate LESS than or EQUAL to 8.  Partial reversal dose:  0.1 mg titrated q 2 minutes for Analgesia Side Effects Monitoring Sedation Level of 3 (frequently drowsy, arousable, drifts to sleep during conversation).Full reversal dose:  0.4 mg bolus for Analgesia Side Effects Monitoring Sedation Level of 4 (somnolent, minimal or no response to stimulation).  For ordered IV doses 0.1-2mg give IVP. Give each  0.4mg over 15 seconds in emergency situations. For non-emergent situations further dilute in 9mL of NS to facilitate titration of response.    Admin. Amount: 0.1-0.4 mg = 0.25-1 mL Conc: 0.4 mg/mL  Dispense Loc:  ADS 33A  Volume: 1 mL               ondansetron (ZOFRAN-ODT) ODT tab 4 mg  Dose: 4 mg  Freq: EVERY 6 HOURS PRN Route: PO  PRN Reasons: nausea,vomiting  Start: 09/30/18 2203   Admin Instructions: This is Step 1 of nausea and vomiting management.  If nausea not resolved in 15 minutes, go to Step 2 prochlorperazine (COMPAZINE). Do not push through foil backing. Peel back foil and gently remove. Place on tongue immediately. Administration with liquid unnecessary  With dry hands, peel back foil backing and gently remove tablet; do not push oral disintegrating tablet through foil backing; administer immediately on tongue and oral disintegrating tablet dissolves in seconds; then swallow with saliva; liquid not required.    Admin. Amount: 1 tablet (1 × 4 mg tablet)  Dispense Loc:  ADS 33A      0941-Auto Hold       1552-Unhold               Or  ondansetron (ZOFRAN) injection 4 mg  Dose: 4 mg  Freq: EVERY 6 HOURS PRN Route: IV  PRN Reasons: nausea,vomiting  Start: 09/30/18 2203   Admin Instructions: This is Step 1 of nausea and vomiting management.  If nausea not resolved in 15 minutes, go to Step 2 prochlorperazine (COMPAZINE).  Irritant. For ordered IV doses 0.1-4 mg, give IV Push undiluted over 2-5 minutes.    Admin. Amount: 4 mg = 2 mL Conc: 4 mg/2 mL  Dispense Loc:  ADS 33A  Infused Over: 2-5 Minutes  Volume: 2 mL      0941-Auto Hold       1552-Unhold                oxyCODONE IR (ROXICODONE) tablet 5-10 mg  Dose: 5-10 mg  Freq: EVERY 3 HOURS PRN Route: PO  PRN Reason: other  PRN Comment: pain control or improvement in physical function. Hold dose for analgesic side effects.  Start: 09/30/18 2203   Admin Instructions: Start with the lowest dose.  May adjust dose by 5 mg every 3 hours as needed. Notify  provider to assess for uncontrolled pain or analgesic side effects. Hold while on PCA or with regular IV opioid dosing.    Admin. Amount: 1-2 tablet (1-2 × 5 mg tablet)  Last Admin: 10/16/18 1414  Dispense Loc:  ADS 33A     0136 (10 mg)-Given       0513 (5 mg)-Given       1454 (5 mg)-Given       2015 (5 mg)-Given        0142 (10 mg)-Given       0941-Auto Hold       1552-Unhold       1803 (10 mg)-Given       2108 (10 mg)-Given           1358 (5 mg)-Given        1103 (5 mg)-Given       1136 (5 mg)-Given       1414 (10 mg)-Given           pantoprazole (PROTONIX) EC tablet 40 mg  Dose: 40 mg  Freq: 2 TIMES DAILY BEFORE MEALS Route: PO  Start: 10/04/18 1100   Admin Instructions: DO NOT CRUSH.    Admin. Amount: 1 tablet (1 × 40 mg tablet)  Last Admin: 10/16/18 0704  Dispense Loc:  ADS 33A     0700 (40 mg)-Given       1709 (40 mg)-Given        (0840)-Not Given       0942-Auto Hold       1552-Unhold       1637 (40 mg)-Given        0622 (40 mg)-Given       1651 (40 mg)-Given        0639 (40 mg)-Given       1815 (40 mg)-Given        0644 (40 mg)-Given       1815 (40 mg)-Given        0644 (40 mg)-Given       1712 (40 mg)-Given        0704 (40 mg)-Given       [ ] 1630           polyethylene glycol (MIRALAX/GLYCOLAX) Packet 17 g  Dose: 17 g  Freq: 2 TIMES DAILY Route: PO  Start: 10/12/18 2100   Admin Instructions: Change to PRN once bowels move.  1 Packet = 17 grams. Mixed prescribed dose in 8 ounces of water. Follow with 8 oz. of water.    Admin. Amount: 17 g  Last Admin: 10/14/18 0825  Dispense Loc:  ADS 33A       2125 (17 g)-Given        0927 (17 g)-Given       2020 (17 g)-Given        0825 (17 g)-Given       (2155)-Not Given        (0811)-Not Given       (2055)-Not Given        (0848)-Not Given       [ ] 2100           polyethylene glycol (MIRALAX/GLYCOLAX) Packet 17 g  Dose: 17 g  Freq: 2 TIMES DAILY PRN Route: PO  PRN Comment: constipation  Start: 10/09/18 0847   Admin Instructions: 1 Packet = 17 grams. Mixed  prescribed dose in 8 ounces of water. Follow with 8 oz. of water.    Admin. Amount: 17 g  Dispense Loc: [facility] 33A      0942-Auto Hold       1552-Unhold                Reason ACE/ARB/ARNI order not selected  Freq: DOES NOT GO TO MAR Route: OTHER  PRN Reason: other  Start: 10/16/18 1248   Order specific questions:  Reason not prescribed: recent rise in serum creatinine     Dispense Loc:  Main Pharmacy  POC: Discharge-NON Med               senna-docusate (SENOKOT-S;PERICOLACE) 8.6-50 MG per tablet 1 tablet  Dose: 1 tablet  Freq: 2 TIMES DAILY PRN Route: PO  PRN Reason: constipation  Start: 09/30/18 2203   Admin Instructions: If no bowel movement in 24 hours, increase to 2 tablets PO.  Hold for loose stools.  This is the first step of a three step constipation treatment.    Admin. Amount: 1 tablet  Last Admin: 10/03/18 0930  Dispense Loc: [facility] 33      0941-Auto Hold       1552-Unhold               Or  senna-docusate (SENOKOT-S;PERICOLACE) 8.6-50 MG per tablet 2 tablet  Dose: 2 tablet  Freq: 2 TIMES DAILY PRN Route: PO  PRN Reason: constipation  Start: 09/30/18 2203   Admin Instructions: Hold for loose stools.  This is the first step of a three step constipation treatment.    Admin. Amount: 2 tablet  Last Admin: 10/07/18 1727  Dispense Loc: [facility] 33A      0941-Auto Hold       1552-Unhold                sennosides (SENOKOT) tablet 2 tablet  Dose: 2 tablet  Freq: 2 TIMES DAILY Route: PO  Start: 10/09/18 0900   Admin. Amount: 2 tablet  Last Admin: 10/14/18 0823  Dispense Loc: [facility] 33A     0832 (2 tablet)-Given       (2057)-Not Given        (0842)-Not Given       0942-Auto Hold       1552-Unhold       2109 (1 tablet)-Given [C]        0846 (2 tablet)-Given       2124 (2 tablet)-Given        0912 (2 tablet)-Given       2019 (2 tablet)-Given        0823 (2 tablet)-Given       (2155)-Not Given        (0811)-Not Given       (2056)-Not Given        (0848)-Not Given       [ ] 2100           sodium chloride (PF) 0.9% PF  flush 3 mL  Dose: 3 mL  Freq: EVERY 8 HOURS Route: IK  Start: 10/06/18 0300   Admin Instructions: And Q1H PRN, to lock peripheral IV dormant line.    Admin. Amount: 3 mL  Last Admin: 10/16/18 1339  Dispense Loc: [facility] Floor Stock  Volume: 3 mL   Current Line: Peripheral IV 10/11/18 Left Lower forearm    0136 (3 mL)-Given              1133 (3 mL)-Given       1810 (3 mL)-Given               0942-Auto Hold       1100 Auto Hold-Automatically Held       1552-Unhold       2009 (3 mL)-Given        0220 (3 mL)-Given       1332 (3 mL)-Given       1850 (3 mL)-Given        0417 (3 mL)-Given       (1549)-Not Given [C]       (2025)-Not Given        (0423)-Not Given       (1312)-Not Given       1935 (3 mL)-Given        0553 (3 mL)-Given       0800 (3 mL)-Given       2045 (3 mL)-Given [C]        0531 (3 mL)-Given       1339 (3 mL)-Given       [ ] 2000           sodium chloride (PF) 0.9% PF flush 3 mL  Dose: 3 mL  Freq: EVERY 1 HOUR PRN Route: IK  PRN Reason: line flush  Start: 10/06/18 0257   Admin Instructions: for peripheral IV flush post IV meds    Admin. Amount: 3 mL  Last Admin: 10/12/18 0306  Dispense Loc: [facility] Floor Stock  Volume: 3 mL      0942-Auto Hold       1552-Unhold        0306 (3 mL)-Given               sodium chloride 0.9% infusion  Rate: 10 mL/hr   Freq: CONTINUOUS Route: IV  Last Dose: Stopped (10/15/18 0813)  Start: 10/11/18 2345   Admin Instructions: Carrier/maintenance fluid for PCA if ordered. MUST use carrier/maintenance fluid to deliver PCA infusion.  If PCA not ordered or discontinued may discontinue this order.    Last Admin: 10/14/18 0623  Dispense Loc: [facility] Floor Stock  Volume: 1,000 mL   Current Line: Peripheral IV 10/11/18 Left Lower forearm      0302 ( )-New Bag       0627 ( )-Rate/Dose Verify        0634 ( )-New Bag        0623 ( )-New Bag        0813-Stopped            Warfarin Therapy Reminder (Check START DATE - warfarin may be starting in the FUTURE)  Dose: 1 each  Freq: CONTINUOUS PRN Route:  XX  Start: 10/14/18 1316   Admin Instructions: *Note to reorder warfarin daily*  Pharmacy Warfarin Dosing Service  Patient is on Warfarin Therapy - check for daily order    Admin. Amount: 1 each  Dispense Loc:  Main Pharmacy              Future Medications  Medications 10/10/18 10/11/18 10/12/18 10/13/18 10/14/18 10/15/18 10/16/18       warfarin (COUMADIN) tablet 10 mg  Dose: 10 mg  Freq: ONCE AT 6PM Route: PO  Start: 10/16/18 1800   Admin Instructions: INR = 1.14    Admin. Amount: 2 tablet (2 × 5 mg tablet)  Dispense Loc:  ADS 33A  Administrations Remainin           [ ] 1800          Completed Medications  Medications 10/10/18 10/11/18 10/12/18 10/13/18 10/14/18 10/15/18 10/16/18         Dose: 1 tablet  Freq: EVERY 12 HOURS SCHEDULED Route: PO  Indications of Use: SKIN AND SOFT TISSUE INFECTION  Start: 10/12/18 2000   End: 10/15/18 0811   Admin. Amount: 1 tablet  Last Admin: 10/15/18 0811  Dispense Loc:  ADS 33A  Administrations Remainin       1925 (1 tablet)-Given        0912 (1 tablet)-Given       2019 (1 tablet)-Given        0823 (1 tablet)-Given       1935 (1 tablet)-Given        0811 (1 tablet)-Given              Dose: 7.5 mg  Freq: ONCE AT 6PM Route: PO  Start: 10/15/18 1800   End: 10/15/18 171   Admin. Amount: 1 tablet (1 × 7.5 mg tablet)  Last Admin: 10/15/18 1712  Dispense Loc:  ADS 33A  Administrations Remainin          1712 (7.5 mg)-Given              Dose: 7.5 mg  Freq: ONCE AT 6PM Route: PO  Start: 10/14/18 1800   End: 10/14/18 1816   Admin. Amount: 1 tablet (1 × 7.5 mg tablet)  Last Admin: 10/14/18 1816  Dispense Loc:  ADS 33A  Administrations Remainin         1816 (7.5 mg)-Given            Discontinued Medications  Medications 10/10/18 10/11/18 10/12/18 10/13/18 10/14/18 10/15/18 10/16/18         Nurse Loading Dose: 0.2 mg  PCA Dose: 0.1 mg  Lockout Interval: 10 Minutes  Continuous Rate: 0 mg/hr  One Hour Dose Limit: 1.2 mg    Freq: CONTINUOUS Route: IV  Last Dose: Stopped  (10/15/18 0812)  Start: 10/11/18 2345   End: 10/15/18 0801   Admin Instructions: Start with the provider specified dose. PCA dose range is 0.1 mg - 0.2 mg. May increase dose by 0.1mg for pain control or improvement in physical function. Hold the dose for analgesic side effects. Notify the provider to assess for uncontrolled pain or analgesic side effects. Do NOT give any additional opioids while on PCA. When transitioning from PCA to oral opioid MAY give first oral dose 30 minutes PRIOR to discontinuation of PCA.    Order specific questions:  Initial Set-up verified by Mikayla Leal RN     Last Admin: 10/15/18 0554  Dispense Loc: [facility] ADS 33A  Volume: 30 mL   Current Line: Peripheral IV 10/11/18 Left Lower forearm      0257-New Syringe/Cartridge       0626-Shift Total       1547-Shift Total       2130-New Syringe/Cartridge        0541-Shift Total       1349-Shift Total       2237-Shift Total        0620-Shift Total       1429-Shift Total       2233-Shift Total        0554-Shift Total       0801-Med Discontinued  0812-Stopped              Dose: 1 each  Freq: CONTINUOUS PRN Route: XX  Start: 10/13/18 1657   End: 10/13/18 1715   Admin Instructions: *Note to reorder warfarin daily*  Pharmacy Warfarin Dosing Service  Patient is on Warfarin Therapy - check for daily order    Admin. Amount: 1 each        1715-Med Discontinued

## 2018-09-30 NOTE — IP AVS SNAPSHOT
MRN:[id number]                      After Visit Summary   9/30/2018    Tad Manning    MRN: [id number]           Thank you!     Thank you for choosing [facility] for your care. Our goal is always to provide you with excellent care. Hearing back from our patients is one way we can continue to improve our services. Please take a few minutes to complete the written survey that you may receive in the mail after you visit with us. Thank you!        Patient Information     Date Of Birth          1940        Designated Caregiver       Most Recent Value    Caregiver    Will someone help with your care after discharge? yes    Name of designated caregiver Felecia Razo     Phone number of caregiver [phone]    Caregiver address [address] [facility]       About your hospital stay     You were admitted on:  September 30, 2018 You last received care in the:  [place] [address] [facility]    You were discharged on:  October 16, 2018        Reason for your hospital stay       1. 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  2. Acute blood loss anemia on chronic anemia.  3. Ischemic cardiomyopathy with acute on chronic biventricular systolic CHF exacerbation.  4. Acute kidney injury, stage stage 2 on CKD stage 3.  5. Physical deconditioning with h/o mechanical falls due to acute illness/chronic debilitation.                  Who to Call     For medical emergencies, please call 911.  For non-urgent questions about your medical care, please call your primary care provider or clinic, [phone]  For questions related to your surgery, please call your surgery clinic        Attending Provider     Provider Trip Ahn DO Internal Medicine    [facility]       Primary Care Provider Office  Phone # Fax #    Gene Guevara [phone]      After Care Instructions     Advance Diet as Tolerated       Follow this diet upon discharge: Orders Placed This Encounter      Snacks/Supplements Adult: Boost Glucose Control; Between Meals      Moderate Consistent CHO Diet              Daily weights       Call Provider for weight gain of more than 2 pounds per day or 5 pounds per week.            Discharge Instructions       Follow up with Dr. Vick in 2-4 weeks for suture removal and evaluation of stump            General info for SNF       Length of Stay Estimate: Short Term Care: Estimated # of Days <30  Condition at Discharge: Improving  Level of care:skilled   Rehabilitation Potential: Good  Admission H&P remains valid and up-to-date: Yes  Recent Chemotherapy: N/A  Use Nursing Home Standing Orders: Yes            Glucose monitor nursing POCT       Before meals and at bedtime            Mantoux instructions       Give two-step Mantoux (PPD) Per Facility Policy Yes            Wound care and dressings       Xeroform/Vasoline gauze with ABD pad directly to incision site. Wrap with kerlix.   Dressing changes Q2-3 days                  Follow-up Appointments     Follow Up and recommended labs and tests       Follow up with residential physician.  The following labs/tests are recommended: CBC, BMP next week. INR daily or per routine for warfarin dosing (goal INR 2-3 for atrial fibrillation).  Follow up with primary care provider after TCU discharge.  Surgical follow up per Vascular Surgery.-- Follow up with Dr. Vick in 2-4 weeks for suture removal and evaluation of stump                  Additional Services     Occupational Therapy Adult Consult       Evaluate and treat as clinically indicated.    Reason:  Weakness/deconditioning            Physical Therapy Adult Consult       Evaluate and treat as clinically indicated.    Reason:  weakness/deconditioning                  Further instructions from your  "care team       Belongings at bedside: clothing;glasses;wallet;watch;walker; cell phone/electronics;dental appliance/dentures;money (\"about $100\" does not want to be sent to security )    General Recommendations To Control Heart Failure When You Get Home     Heart Failure Instructions for Patients and Families: Please read and check off each of these important instructions as you do them when you get home.     Weight and Symptoms    ___ Put a scale in your bathroom.    ___ Post a weight chart or calendar next to your scale.    ___ Weigh yourself everyday as soon as you get up in the morning (before breakfast).  You should only be wearing your pajamas.  Write your weight on the chart/calendar.    ___ Bring your weight chart/calendar with you to all appointments.    ___ Call your doctor or nurse practitioner if you gain 2 pounds (in 1 day) or 5 pounds in (1 week) from your goal  good  weight.  Your good weight is also called your  dry  weight.  Your doctor or nurse will tell you what your good weight should be.    ___ Call your doctor or nurse practitioner if you have shortness of breath that gets worse over time, leg swelling or fatigue.    Medications and Diet    ___ Make sure to take your medication as prescribed.    ___ Bring a current list of your medication and all of your medicine bottles with you to all appointments.    ___ Limit fluids if you still have swelling or shortness of breath, or if your doctor tells you to do so.      ___ Eat less than 2000 mg of sodium (salt) every day. Read food labels, and do not add salt to meals. Remember, if you eat less salt you retain less fluid.    ___ Follow a heart healthy diet that is low in saturated fat.         Activity and Suggested Lifestyle Changes   ___ Stay active. Talk to your doctor about an exercise program that is safe for your heart.    ___ Stop smoking. Reduce alcohol use.      ___ Lose weight if you are overweight. Extra weight puts a lot of stress on the " heart.    Control for Leg Swelling  ___ Keep your legs elevated (raised) as needed for swelling. If swelling is uncomfortable or elevation doesn t help, ask your doctor about using ACE wraps or support stockings.      What is the [facility].O.R[facility] Clinic?     Cardiomyopathy  Optimization  Rehabilitation  Education    The [facility].R[facility] Clinic is a heart failure specialty clinic within [facility].  It is an outpatient disease management program that is based on a phase-by-phase approach, which is tailored to each patient s individual needs.  The cardiologist, nurse practitioner, physician assistant and nurses provide an ongoing outpatient care and treatment plan that guides heart failure and cardiomyopathy patients from evaluation and education to stabilization. This team works with your current primary care doctor and cardiologist to help you:      Avoid hospitalizations    Slow the progression of the disease    Improve length and quality of life    Know who and when to call if heart failure symptoms appear    Receive easy access to quality health care and advice    Better understand your condition and treatment    Decrease the tremendous cost burden of heart failure care    Detect future heart problems before they become life threatening    Your C.O.R.E. Clinic Team will continue to educate you on your heart failure and may adjust medications based on your vital signs, lab work, and how you are feeling.  Therefore, it is very important to bring the following to all C.O.R.E. appointments:    - An accurate list of your medications  - Your medicine bottles  - Your weight chart/calendar    [facility]):    [phone]  [facility]):    [phone]  [facility]  [phone]        Pending Results     Date and Time Order Name Status Description    10/11/2018 1335 Surgical pathology exam In process     10/8/2018 1507 Surgical pathology  exam In process             Statement of Approval     Ordered          10/16/18 1249  I have reviewed and agree with all the recommendations and orders detailed in this document.  EFFECTIVE NOW     Approved and electronically signed by:  Abdiel Alejandro MD             Admission Information     Date & Time Provider Department Dept. Phone    9/30/2018 Mikael Kendall MD [place] [address] [facility] [phone]      Your Vitals Were     Blood Pressure Pulse Temperature Respirations Height Weight    136/64 65 97.9  F (36.6  C) (Oral) 16 1.829 m (6') 79.3 kg (174 lb 13.2 oz)    Pulse Oximetry BMI (Body Mass Index)                98% 23.71 kg/m2          MyChart Information     [employer] gives you secure access to your electronic health record. If you see a primary care provider, you can also send messages to your care team and make appointments. If you have questions, please call your primary care clinic.  If you do not have a primary care provider, please call [phone] and they will assist you.        Care EveryWhere ID     This is your Care EveryWhere ID. This could be used by other organizations to access your [place] [facility] medical records  [id number]        Equal Access to Services     STUART ELLISON AH: Hadii liana linton Sogin, waaxda luqadaha, qaybta kaalmada adecarlosyada, deandre del cid. So [facility] [phone].    ATENCIÓN: Si habla español, tiene a noe disposición servicios gratuitos de asistencia lingüística. Llame al [phone].    We comply with applicable federal civil rights laws and [place] laws. We do not discriminate on the basis of race, color, national origin, age, disability, sex, sexual orientation, or gender identity.               Review of your medicines      START taking        Dose / Directions    bisacodyl 10 MG Suppository   Commonly known as:  DULCOLAX   Used for:  Slow transit constipation        Dose:  10 mg   Place 1 suppository (10  mg) rectally daily as needed for constipation   Quantity:  30 suppository   Refills:  0       ferrous sulfate 325 (65 Fe) MG tablet   Commonly known as:  IRON   Used for:  Anemia, unspecified type        Dose:  325 mg   Take 1 tablet (325 mg) by mouth daily   Quantity:  100 tablet   Refills:  0       * insulin aspart 100 UNIT/ML injection   Commonly known as:  NovoLOG PEN        Dose:  10 Units   Inject 10 Units Subcutaneous 3 times daily (with meals)   Refills:  0       * insulin aspart 100 UNIT/ML injection   Commonly known as:  NovoLOG FLEXPEN        Novolog Flexpen Give before meals and before bed: For Pre-Meal Glucose: 140-189 give 1 unit  190-239 give 2 units  240-289 give 3 units  290-339 give 4 units  = or >340 give 5 units   For Bedtime Glucose 200 - 239 give 1 units  240 - 289 give 1.5 units 290 - 339 give 2 units = or >340 give 2.5 units   Quantity:  15 mL   Refills:  0       insulin glargine 100 UNIT/ML injection   Commonly known as:  LANTUS        Dose:  16 Units   Inject 16 Units Subcutaneous At Bedtime   Refills:  0       miconazole 2 % powder   Commonly known as:  MICATIN; MICRO GUARD   Used for:  Skin disease, fungal        Apply topically 3 times daily   Refills:  0       oxyCODONE IR 5 MG tablet   Commonly known as:  ROXICODONE   Used for:  Osteomyelitis of foot, right, acute (H)        Dose:  5-10 mg   Take 1-2 tablets (5-10 mg) by mouth every 4 hours as needed for pain   Quantity:  20 tablet   Refills:  0       pantoprazole 40 MG EC tablet   Commonly known as:  PROTONIX   Used for:  Gastroesophageal reflux disease, esophagitis presence not specified        Dose:  40 mg   Take 1 tablet (40 mg) by mouth daily   Quantity:  30 tablet   Refills:  0       polyethylene glycol Packet   Commonly known as:  MIRALAX/GLYCOLAX   Used for:  Slow transit constipation        Dose:  17 g   Take 17 g by mouth 2 times daily   Quantity:  7 packet   Refills:  0       PROVIDER DOSED MANAGED WARFARIN (COUMADIN)  OUTPATIENT        Dose based on INR per Primary MD/Nursing Home MD.   Refills:  0       sennosides 8.6 MG tablet   Commonly known as:  SENOKOT   Used for:  Slow transit constipation        Dose:  2 tablet   Take 2 tablets by mouth 2 times daily   Quantity:  120 each   Refills:  0       * Notice:  This list has 2 medication(s) that are the same as other medications prescribed for you. Read the directions carefully, and ask your doctor or other care provider to review them with you.      CONTINUE these medicines which have NOT CHANGED        Dose / Directions    acetaminophen 325 MG tablet   Commonly known as:  TYLENOL        Dose:  325 mg   Take 325 mg by mouth every 4 hours as needed for mild pain   Refills:  0       atorvastatin 20 MG tablet   Commonly known as:  LIPITOR   Used for:  Claudication of left lower extremity (H)        TAKE 1 TABLET BY MOUTH EVERY DAY   Quantity:  90 tablet   Refills:  3       CELEXA PO        Dose:  10 mg   Take 10 mg by mouth daily   Refills:  0       cholecalciferol 1000 units Tabs   Used for:  Loop diuretic causing adverse effect in therapeutic use, subsequent encounter        Dose:  1000 Units   Take 1,000 Units by mouth daily   Quantity:  30 tablet   Refills:  0       clopidogrel 75 MG tablet   Commonly known as:  PLAVIX   Used for:  Status post percutaneous transluminal coronary angioplasty, Coronary artery disease involving native coronary artery of native heart without angina pectoris        Dose:  75 mg   Take 1 tablet (75 mg) by mouth daily   Quantity:  90 tablet   Refills:  3       CORTIZONE-10 1 % ointment   Generic drug:  hydrocortisone        Apply topically 2 times daily as needed for itching   Refills:  0       furosemide 20 MG tablet   Commonly known as:  LASIX   Used for:  Chronic systolic heart failure (H)        Dose:  40 mg   Take 2 tablets (40 mg) by mouth daily   Quantity:  180 tablet   Refills:  3       MELATONIN PO        Dose:  5 mg   Take 5 mg by mouth daily    Refills:  0       metFORMIN 500 MG tablet   Commonly known as:  GLUCOPHAGE        Dose:  500 mg   Take 1 tablet (500 mg) by mouth 2 times daily (with meals)   Quantity:  60 tablet   Refills:  0       metoprolol succinate 200 MG 24 hr tablet   Commonly known as:  TOPROL-XL   Used for:  Chronic systolic heart failure (H)        Dose:  100 mg   Take 0.5 tablets (100 mg) by mouth daily   Quantity:  90 tablet   Refills:  3       nitroGLYcerin 0.4 MG sublingual tablet   Commonly known as:  NITROSTAT   Used for:  Atrial flutter, unspecified type (H)        For chest pain place 1 tablet under the tongue every 5 minutes for 3 doses. If symptoms persist 5 minutes after 1st dose call 911.   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 INR check on Wednesday 9/26 and continue as directed by coumadin clinic.   Quantity:  45 tablet   Refills:  1         STOP taking     nystatin cream   Commonly known as:  MYCOSTATIN           ranitidine 150 MG tablet   Commonly known as:  ZANTAC                Where to get your medicines      Some of these will need a paper prescription and others can be bought over the counter. Ask your nurse if you have questions.     You don't need a prescription for these medications     bisacodyl 10 MG Suppository    ferrous sulfate 325 (65 Fe) MG tablet    insulin aspart 100  UNIT/ML injection    insulin aspart 100 UNIT/ML injection    insulin glargine 100 UNIT/ML injection    miconazole 2 % powder    pantoprazole 40 MG EC tablet    polyethylene glycol Packet    PROVIDER DOSED MANAGED WARFARIN (COUMADIN) OUTPATIENT    sennosides 8.6 MG tablet         Information about where to get these medications is not yet available     ! Ask your nurse or doctor about these medications     oxyCODONE IR 5 MG tablet                Protect others around you: Learn how to safely use, store and throw away your medicines at www.disposemymeds.org.        Information about OPIOIDS     PRESCRIPTION OPIOIDS: WHAT YOU NEED TO KNOW   We gave you an opioid (narcotic) pain medicine. It is important to manage your pain, but opioids are not always the best choice. You should first try all the other options your care team gave you. Take this medicine for as short a time (and as few doses) as possible.    Some activities can increase your pain, such as bandage changes or therapy sessions. It may help to take your pain medicine 30 to 60 minutes before these activities. Reduce your stress by getting enough sleep, working on hobbies you enjoy and practicing relaxation or meditation. Talk to your care team about ways to manage your pain beyond prescription opioids.    These medicines have risks:    DO NOT drive when on new or higher doses of pain medicine. These medicines can affect your alertness and reaction times, and you could be arrested for driving under the influence (DUI). If you need to use opioids long-term, talk to your care team about driving.    DO NOT operate heavy machinery    DO NOT do any other dangerous activities while taking these medicines.    DO NOT drink any alcohol while taking these medicines.     If the opioid prescribed includes acetaminophen, DO NOT take with any other medicines that contain acetaminophen. Read all labels carefully. Look for the word  acetaminophen  or  Tylenol.  Ask your  pharmacist if you have questions or are unsure.    You can get addicted to pain medicines, especially if you have a history of addiction (chemical, alcohol or substance dependence). Talk to your care team about ways to reduce this risk.    All opioids tend to cause constipation. Drink plenty of water and eat foods that have a lot of fiber, such as fruits, vegetables, prune juice, apple juice and high-fiber cereal. Take a laxative (Miralax, milk of magnesia, Colace, Senna) if you don t move your bowels at least every other day. Other side effects include upset stomach, sleepiness, dizziness, throwing up, tolerance (needing more of the medicine to have the same effect), physical dependence and slowed breathing.    Store your pills in a secure place, locked if possible. We will not replace any lost or stolen medicine. If you don t finish your medicine, please throw away (dispose) as directed by your pharmacist. The [place] Pollution [employer] has more information about safe disposal: https://www.pca.[facility].us/living-green/managing-unwanted-medications             Medication List: This is a list of all your medications and when to take them. Check marks below indicate your daily home schedule. Keep this list as a reference.      Medications           Morning Afternoon Evening Bedtime As Needed    acetaminophen 325 MG tablet   Commonly known as:  TYLENOL   Take 325 mg by mouth every 4 hours as needed for mild pain   Last time this was given:  650 mg on 10/11/2018  7:57 PM                                   atorvastatin 20 MG tablet   Commonly known as:  LIPITOR   TAKE 1 TABLET BY MOUTH EVERY DAY   Last time this was given:  20 mg on 10/16/2018  8:43 AM                                   bisacodyl 10 MG Suppository   Commonly known as:  DULCOLAX   Place 1 suppository (10 mg) rectally daily as needed for constipation   Last time this was given:  10 mg on 10/3/2018  1:33 PM                                   CELEXA PO    Take 10 mg by mouth daily   Last time this was given:  10 mg on 10/16/2018  8:43 AM                                   cholecalciferol 1000 units Tabs   Take 1,000 Units by mouth daily   Last time this was given:  1,000 Units on 10/16/2018  8:43 AM                                   clopidogrel 75 MG tablet   Commonly known as:  PLAVIX   Take 1 tablet (75 mg) by mouth daily   Last time this was given:  75 mg on 10/16/2018  8:43 AM                                   CORTIZONE-10 1 % ointment   Apply topically 2 times daily as needed for itching   Generic drug:  hydrocortisone                                   ferrous sulfate 325 (65 Fe) MG tablet   Commonly known as:  IRON   Take 1 tablet (325 mg) by mouth daily   Last time this was given:  325 mg on 10/16/2018  8:43 AM                                   furosemide 20 MG tablet   Commonly known as:  LASIX   Take 2 tablets (40 mg) by mouth daily   Last time this was given:  40 mg on 10/16/2018  8:43 AM                                   * insulin aspart 100 UNIT/ML injection   Commonly known as:  NovoLOG PEN   Inject 10 Units Subcutaneous 3 times daily (with meals)   Last time this was given:  10 Units on 10/16/2018  1:36 PM                                         * insulin aspart 100 UNIT/ML injection   Commonly known as:  NovoLOG FLEXPEN   Novolog Flexpen Give before meals and before bed: For Pre-Meal Glucose: 140-189 give 1 unit  190-239 give 2 units  240-289 give 3 units  290-339 give 4 units  = or >340 give 5 units   For Bedtime Glucose 200 - 239 give 1 units  240 - 289 give 1.5 units 290 - 339 give 2 units = or >340 give 2.5 units   Last time this was given:  10 Units on 10/16/2018  1:36 PM                                insulin glargine 100 UNIT/ML injection   Commonly known as:  LANTUS   Inject 16 Units Subcutaneous At Bedtime   Last time this was given:  16 Units on 10/15/2018  9:41 PM                                   MELATONIN PO   Take 5 mg by mouth daily    Last time this was given:  5 mg on 10/15/2018  9:41 PM                                   metFORMIN 500 MG tablet   Commonly known as:  GLUCOPHAGE   Take 1 tablet (500 mg) by mouth 2 times daily (with meals)   Last time this was given:  500 mg on 10/16/2018  8:43 AM                                   metoprolol succinate 200 MG 24 hr tablet   Commonly known as:  TOPROL-XL   Take 0.5 tablets (100 mg) by mouth daily   Last time this was given:  100 mg on 10/15/2018  8:43 PM                                   miconazole 2 % powder   Commonly known as:  MICATIN; MICRO GUARD   Apply topically 3 times daily   Last time this was given:  10/16/2018  8:47 AM                                   nitroGLYcerin 0.4 MG sublingual tablet   Commonly known as:  NITROSTAT   For chest pain place 1 tablet under the tongue every 5 minutes for 3 doses. If symptoms persist 5 minutes after 1st dose call 911.                                   order for DME   Equipment being ordered: DH2 shoe                                oxyCODONE IR 5 MG tablet   Commonly known as:  ROXICODONE   Take 1-2 tablets (5-10 mg) by mouth every 4 hours as needed for pain   Last time this was given:  10 mg on 10/16/2018  2:14 PM                                   pantoprazole 40 MG EC tablet   Commonly known as:  PROTONIX   Take 1 tablet (40 mg) by mouth daily   Last time this was given:  40 mg on 10/16/2018  7:04 AM                                   polyethylene glycol Packet   Commonly known as:  MIRALAX/GLYCOLAX   Take 17 g by mouth 2 times daily   Last time this was given:  17 g on 10/14/2018  8:25 AM                                      PROVIDER DOSED MANAGED WARFARIN (COUMADIN) OUTPATIENT   Dose based on INR per Primary MD/Nursing Home MD.                                sennosides 8.6 MG tablet   Commonly known as:  SENOKOT   Take 2 tablets by mouth 2 times daily   Last time this was given:  2 tablets on 10/14/2018  8:23 AM                                       sodium chloride 0.65 % nasal spray   Commonly known as:  EQ SALINE NASAL SPRAY   Spray 1 spray into both nostrils daily as needed for congestion                                   warfarin 5 MG tablet   Commonly known as:  COUMADIN   Take 0.5 tablets tonight 9/24 and 1 tab tomorrow night 9/25. INR check on Wednesday 9/26 and continue as directed by coumadin clinic.   Last time this was given:  7.5 mg on 10/15/2018  5:12 PM                                   * Notice:  This list has 2 medication(s) that are the same as other medications prescribed for you. Read the directions carefully, and ask your doctor or other care provider to review them with you.

## 2018-10-01 ENCOUNTER — APPOINTMENT (OUTPATIENT)
Dept: MRI IMAGING | Facility: CLINIC | Age: 78
DRG: 854 | End: 2018-10-01
Attending: INTERNAL MEDICINE
Payer: MEDICARE

## 2018-10-01 ENCOUNTER — APPOINTMENT (OUTPATIENT)
Dept: ULTRASOUND IMAGING | Facility: CLINIC | Age: 78
DRG: 854 | End: 2018-10-01
Attending: PODIATRIST
Payer: MEDICARE

## 2018-10-01 PROBLEM — E87.1 HYPONATREMIA: Status: ACTIVE | Noted: 2018-10-01

## 2018-10-01 PROBLEM — M86.171 OSTEOMYELITIS OF FOOT, RIGHT, ACUTE (H): Status: ACTIVE | Noted: 2017-02-15

## 2018-10-01 PROBLEM — N18.30 CRF (CHRONIC RENAL FAILURE), STAGE 3 (MODERATE) (H): Status: ACTIVE | Noted: 2017-01-20

## 2018-10-01 LAB
ANION GAP SERPL CALCULATED.3IONS-SCNC: 12 MMOL/L (ref 3–14)
BUN SERPL-MCNC: 32 MG/DL (ref 7–30)
CALCIUM SERPL-MCNC: 8.2 MG/DL (ref 8.5–10.1)
CHLORIDE SERPL-SCNC: 94 MMOL/L (ref 94–109)
CO2 SERPL-SCNC: 22 MMOL/L (ref 20–32)
CREAT SERPL-MCNC: 1.6 MG/DL (ref 0.66–1.25)
CRP SERPL-MCNC: 208 MG/L (ref 0–8)
ERYTHROCYTE [DISTWIDTH] IN BLOOD BY AUTOMATED COUNT: 14.3 % (ref 10–15)
ERYTHROCYTE [SEDIMENTATION RATE] IN BLOOD BY WESTERGREN METHOD: 67 MM/H (ref 0–20)
FERRITIN SERPL-MCNC: 562 NG/ML (ref 26–388)
GFR SERPL CREATININE-BSD FRML MDRD: 42 ML/MIN/1.7M2
GLUCOSE BLDC GLUCOMTR-MCNC: 279 MG/DL (ref 70–99)
GLUCOSE BLDC GLUCOMTR-MCNC: 281 MG/DL (ref 70–99)
GLUCOSE BLDC GLUCOMTR-MCNC: 315 MG/DL (ref 70–99)
GLUCOSE BLDC GLUCOMTR-MCNC: 335 MG/DL (ref 70–99)
GLUCOSE BLDC GLUCOMTR-MCNC: 354 MG/DL (ref 70–99)
GLUCOSE BLDC GLUCOMTR-MCNC: 370 MG/DL (ref 70–99)
GLUCOSE SERPL-MCNC: 295 MG/DL (ref 70–99)
HCT VFR BLD AUTO: 24.5 % (ref 40–53)
HGB BLD-MCNC: 8.6 G/DL (ref 13.3–17.7)
INR PPP: 1.71 (ref 0.86–1.14)
INR PPP: 2.66 (ref 0.86–1.14)
INR PPP: 2.75 (ref 0.86–1.14)
IRON SATN MFR SERPL: 12 % (ref 15–46)
IRON SERPL-MCNC: 16 UG/DL (ref 35–180)
MCH RBC QN AUTO: 31.6 PG (ref 26.5–33)
MCHC RBC AUTO-ENTMCNC: 35.1 G/DL (ref 31.5–36.5)
MCV RBC AUTO: 90 FL (ref 78–100)
PLATELET # BLD AUTO: 97 10E9/L (ref 150–450)
POTASSIUM SERPL-SCNC: 4.3 MMOL/L (ref 3.4–5.3)
RBC # BLD AUTO: 2.72 10E12/L (ref 4.4–5.9)
SODIUM SERPL-SCNC: 128 MMOL/L (ref 133–144)
TIBC SERPL-MCNC: 139 UG/DL (ref 240–430)
TRANSFERRIN SERPL-MCNC: 125 MG/DL (ref 210–360)
WBC # BLD AUTO: 13.3 10E9/L (ref 4–11)

## 2018-10-01 PROCEDURE — 25000128 H RX IP 250 OP 636: Performed by: INTERNAL MEDICINE

## 2018-10-01 PROCEDURE — A9270 NON-COVERED ITEM OR SERVICE: HCPCS | Mod: GY | Performed by: INTERNAL MEDICINE

## 2018-10-01 PROCEDURE — 80048 BASIC METABOLIC PNL TOTAL CA: CPT | Performed by: INTERNAL MEDICINE

## 2018-10-01 PROCEDURE — 84466 ASSAY OF TRANSFERRIN: CPT | Performed by: INTERNAL MEDICINE

## 2018-10-01 PROCEDURE — 25500064 ZZH RX 255 OP 636: Performed by: INTERNAL MEDICINE

## 2018-10-01 PROCEDURE — 85652 RBC SED RATE AUTOMATED: CPT | Performed by: INTERNAL MEDICINE

## 2018-10-01 PROCEDURE — 25000132 ZZH RX MED GY IP 250 OP 250 PS 637: Mod: GY | Performed by: INTERNAL MEDICINE

## 2018-10-01 PROCEDURE — 36415 COLL VENOUS BLD VENIPUNCTURE: CPT | Performed by: INTERNAL MEDICINE

## 2018-10-01 PROCEDURE — 25000131 ZZH RX MED GY IP 250 OP 636 PS 637: Mod: GY | Performed by: INTERNAL MEDICINE

## 2018-10-01 PROCEDURE — A9585 GADOBUTROL INJECTION: HCPCS | Performed by: INTERNAL MEDICINE

## 2018-10-01 PROCEDURE — 0JDQ0ZZ EXTRACTION OF RIGHT FOOT SUBCUTANEOUS TISSUE AND FASCIA, OPEN APPROACH: ICD-10-PCS | Performed by: PODIATRIST

## 2018-10-01 PROCEDURE — 40000902 ZZH STATISTIC WOC PT EDUCATION, 16-30 MIN

## 2018-10-01 PROCEDURE — 86140 C-REACTIVE PROTEIN: CPT | Performed by: INTERNAL MEDICINE

## 2018-10-01 PROCEDURE — 99232 SBSQ HOSP IP/OBS MODERATE 35: CPT | Performed by: INTERNAL MEDICINE

## 2018-10-01 PROCEDURE — 93922 UPR/L XTREMITY ART 2 LEVELS: CPT

## 2018-10-01 PROCEDURE — 82728 ASSAY OF FERRITIN: CPT | Performed by: INTERNAL MEDICINE

## 2018-10-01 PROCEDURE — G0463 HOSPITAL OUTPT CLINIC VISIT: HCPCS

## 2018-10-01 PROCEDURE — 85610 PROTHROMBIN TIME: CPT | Performed by: INTERNAL MEDICINE

## 2018-10-01 PROCEDURE — 12000000 ZZH R&B MED SURG/OB

## 2018-10-01 PROCEDURE — 83550 IRON BINDING TEST: CPT | Performed by: INTERNAL MEDICINE

## 2018-10-01 PROCEDURE — 83540 ASSAY OF IRON: CPT | Performed by: INTERNAL MEDICINE

## 2018-10-01 PROCEDURE — 85027 COMPLETE CBC AUTOMATED: CPT | Performed by: INTERNAL MEDICINE

## 2018-10-01 PROCEDURE — 73720 MRI LWR EXTREMITY W/O&W/DYE: CPT | Mod: RT

## 2018-10-01 PROCEDURE — 99222 1ST HOSP IP/OBS MODERATE 55: CPT | Performed by: PODIATRIST

## 2018-10-01 PROCEDURE — 00000146 ZZHCL STATISTIC GLUCOSE BY METER IP

## 2018-10-01 PROCEDURE — 97602 WOUND(S) CARE NON-SELECTIVE: CPT

## 2018-10-01 RX ORDER — GADOBUTROL 604.72 MG/ML
8 INJECTION INTRAVENOUS ONCE
Status: COMPLETED | OUTPATIENT
Start: 2018-10-01 | End: 2018-10-01

## 2018-10-01 RX ADMIN — VITAMIN D, TAB 1000IU (100/BT) 1000 UNITS: 25 TAB at 08:45

## 2018-10-01 RX ADMIN — METOPROLOL SUCCINATE 100 MG: 100 TABLET, EXTENDED RELEASE ORAL at 22:29

## 2018-10-01 RX ADMIN — GADOBUTROL 8 ML: 604.72 INJECTION INTRAVENOUS at 06:32

## 2018-10-01 RX ADMIN — MICONAZOLE NITRATE: 2 POWDER TOPICAL at 18:35

## 2018-10-01 RX ADMIN — PIPERACILLIN SODIUM,TAZOBACTAM SODIUM 3.38 G: 3; .375 INJECTION, POWDER, FOR SOLUTION INTRAVENOUS at 18:21

## 2018-10-01 RX ADMIN — VANCOMYCIN HYDROCHLORIDE 1750 MG: 5 INJECTION, POWDER, LYOPHILIZED, FOR SOLUTION INTRAVENOUS at 00:25

## 2018-10-01 RX ADMIN — MELATONIN 5 MG TABLET 5 MG: at 00:24

## 2018-10-01 RX ADMIN — PIPERACILLIN SODIUM,TAZOBACTAM SODIUM 3.38 G: 3; .375 INJECTION, POWDER, FOR SOLUTION INTRAVENOUS at 12:30

## 2018-10-01 RX ADMIN — PHYTONADIONE 2 MG: 10 INJECTION, EMULSION INTRAMUSCULAR; INTRAVENOUS; SUBCUTANEOUS at 15:50

## 2018-10-01 RX ADMIN — INSULIN ASPART 5 UNITS: 100 INJECTION, SOLUTION INTRAVENOUS; SUBCUTANEOUS at 13:49

## 2018-10-01 RX ADMIN — PIPERACILLIN SODIUM,TAZOBACTAM SODIUM 3.38 G: 3; .375 INJECTION, POWDER, FOR SOLUTION INTRAVENOUS at 06:47

## 2018-10-01 RX ADMIN — MELATONIN 5 MG TABLET 5 MG: at 22:29

## 2018-10-01 RX ADMIN — INSULIN ASPART 4 UNITS: 100 INJECTION, SOLUTION INTRAVENOUS; SUBCUTANEOUS at 10:45

## 2018-10-01 RX ADMIN — INSULIN ASPART 3 UNITS: 100 INJECTION, SOLUTION INTRAVENOUS; SUBCUTANEOUS at 22:30

## 2018-10-01 RX ADMIN — METOPROLOL SUCCINATE 100 MG: 100 TABLET, EXTENDED RELEASE ORAL at 00:24

## 2018-10-01 RX ADMIN — MICONAZOLE NITRATE: 2 POWDER TOPICAL at 22:31

## 2018-10-01 RX ADMIN — INSULIN ASPART 3 UNITS: 100 INJECTION, SOLUTION INTRAVENOUS; SUBCUTANEOUS at 00:25

## 2018-10-01 RX ADMIN — CITALOPRAM HYDROBROMIDE 10 MG: 10 TABLET ORAL at 08:45

## 2018-10-01 RX ADMIN — ATORVASTATIN CALCIUM 20 MG: 20 TABLET, FILM COATED ORAL at 08:45

## 2018-10-01 RX ADMIN — INSULIN ASPART 5 UNITS: 100 INJECTION, SOLUTION INTRAVENOUS; SUBCUTANEOUS at 18:02

## 2018-10-01 RX ADMIN — CLOPIDOGREL 75 MG: 75 TABLET, FILM COATED ORAL at 08:45

## 2018-10-01 ASSESSMENT — ACTIVITIES OF DAILY LIVING (ADL)
ADLS_ACUITY_SCORE: 18
ADLS_ACUITY_SCORE: 16
ADLS_ACUITY_SCORE: 18
ADLS_ACUITY_SCORE: 17
ADLS_ACUITY_SCORE: 16
ADLS_ACUITY_SCORE: 17

## 2018-10-01 NOTE — PROGRESS NOTES
[facility]    Hospitalist Progress Note    Assessment & Plan   Tad Manning is a 77 year old male with diabetes type 2 and neuropathy who was admitted on 9/30/2018 with right foot infection and found to have:    Impression:   Principal Problem:    Probable Osteomyelitis Right Foot   -- on Zosyn, and podiatry evaluating need for surgery  Active Problems:    DM type 2 w neuropathy -- Hgb A1C 9.0 on 9/22/18, poor control   -- start Lantus 20 units subcutaneous at bedtime   -- consider schedule Novolog with meals tomorrow   -- need to determine if/when surgery    HTN (hypertension)    CAD -- S/P CABG    Osteomyelitis left 2nd Toe s/p amputaion 1/10/14    Chronic systolic heart failure (H)    Atrial fibrillation -- on Warfarin   -- warfarin held, Vit K 2 mg given today.       Plan:  Await surgery decision -- ?debridement with partial amp of right foot if needed.  Will reverse INR with Vit K 2 mg IV and check INR in AM.      DVT Prophylaxis: Warfarin  Code Status: Full Code    Disposition: Expected discharge in ?4 days once has foot surgery.    Mikael Thomas MD  Pager [phone]  Cell Phone [phone]  Text Page (7am to 6pm)    Interval History   Minimal right foot pain but has noted drainage for > 2 weeks.      Physical Exam   Temp: 98.3  F (36.8  C) Temp src: Oral BP: 125/83 Pulse: 91   Resp: 18 SpO2: 95 % O2 Device: None (Room air)    Vitals:    09/30/18 2140   Weight: 80.5 kg (177 lb 8 oz)     Vital Signs with Ranges  Temp:  [98.3  F (36.8  C)-99.4  F (37.4  C)] 98.3  F (36.8  C)  Pulse:  [] 91  Resp:  [18] 18  BP: (125-128)/(61-83) 125/83  SpO2:  [95 %-99 %] 95 %  I/O last 3 completed shifts:  In: -   Out: 50 [Urine:50]    # Pain Assessment:  Current Pain Score 9/30/2018   Patient currently in pain? denies   Pain score (0-10) -   Pain location -   Pain descriptors -   Tad cooper pain level was assessed and he currently denies pain.        Constitutional: Awake, alert,  cooperative, no apparent distress  Respiratory: Clear to auscultation bilaterally, no crackles or wheezing  Cardiovascular: irregular rate and rhythm, and no murmur noted  GI: Normal bowel sounds, soft, non-distended, non-tender  Extrem: No calf tenderness, trace bilateral ankle edema, and right foot with ecchymosis and fluid over much of plantar surface of right foot, and ecchymosis of right 5th toe.  Left foot with toe amputation  Neuro: Ox3, no focal motor, decreased light touch to lower shin bilaterally.      Medications       atorvastatin  20 mg Oral Daily     cholecalciferol  1,000 Units Oral Daily     citalopram (celeXA) tablet 10 mg  10 mg Oral Daily     clopidogrel  75 mg Oral Daily     insulin aspart  1-7 Units Subcutaneous TID AC     insulin aspart  1-5 Units Subcutaneous At Bedtime     melatonin tablet 5 mg  5 mg Oral At Bedtime     metoprolol succinate  100 mg Oral Daily     piperacillin-tazobactam  3.375 g Intravenous Q6H     vancomycin (VANCOCIN) IV  1,750 mg Intravenous Q24H       Data     Recent Labs  Lab 09/30/18  2240 09/27/18  1250 09/27/18  1001   INR 2.66* 1.40* 1.3   NA  --  129*  --    POTASSIUM  --  5.0  --    CHLORIDE  --  95  --    CO2  --  22  --    BUN  --  47*  --    CR  --  1.76*  --    ANIONGAP  --  12  --    JUSTINO  --  9.4  --    GLC  --  296*  --        Imaging:   No results found for this or any previous visit (from the past 24 hour(s)).

## 2018-10-01 NOTE — PHARMACY-VANCOMYCIN DOSING SERVICE
Pharmacy Vancomycin Initial Note  Date of Service 2018  Patient's  1940  77 year old, male    Indication: Osteomyelitis    Current estimated CrCl = Estimated Creatinine Clearance: 41.3 mL/min (based on Cr of 1.76).    Creatinine for last 3 days  No results found for requested labs within last 72 hours.    Recent Vancomycin Level(s) for last 3 days  No results found for requested labs within last 72 hours.      Vancomycin IV Administrations (past 72 hours)      No vancomycin orders with administrations in past 72 hours.                Nephrotoxins and other renal medications (Future)    Start     Dose/Rate Route Frequency Ordered Stop    10/01/18 0000  vancomycin (VANCOCIN) 1,750 mg in sodium chloride 0.9 % 500 mL intermittent infusion      1,750 mg  over 2 Hours Intravenous EVERY 24 HOURS 18 2235      18 2215  piperacillin-tazobactam (ZOSYN) 3.375 g vial to attach to  mL bag     Comments:  Pharmacy can adjust dose based on renal function.    3.375 g  over 30 Minutes Intravenous EVERY 6 HOURS 18 2204            Contrast Orders - past 72 hours     None                Plan:  1.  Start vancomycin  1750 mg IV q24h.   2.  Goal Trough Level: 15-20 mg/L   3.  Pharmacy will check trough levels as appropriate in 1-3 Days.    4. Serum creatinine levels will be ordered daily for the first week of therapy and at least twice weekly for subsequent weeks.    5. Nordland method utilized to dose vancomycin therapy: Method 1    Karina Young

## 2018-10-01 NOTE — PROVIDER NOTIFICATION
MD Notification    Notified Person: MD    Notified Person Name:     Notification Date/Time: 2325 9/30/2018    Notification Interaction: web paged    Purpose of Notification: Sepsis Protocol- Lactic Acid 2.1    Orders Received:    Comments:

## 2018-10-01 NOTE — PLAN OF CARE
Problem: Patient Care Overview  Goal: Plan of Care/Patient Progress Review  Outcome: No Change  AOx4, VSS except low grade temp, on RA, up with assist of 1, mod carb diet, lactic acid came back 2.1, will continue to run IV antibiotics, IV infilitrated w/ vanco, stopped pump at 0330 pt asymptomatic and reports zero pain or discomfort to area, MD paged and ordered a new IV in other arm and continue to run remaining vanco, pump started again at 0415, site checked at 0530 and no s/s of infiltration, scheduled for MRI this morning after vanco completes

## 2018-10-01 NOTE — CONSULTS
PATIENT HISTORY:  Tad Manning is a 77 year old male who was admitted for right foot infection.       I was asked to see Tad Manning  by  for right foot wound, possible osteomyelitis.     Patient was seen at bedside. Notes that he has had a left 2nd toe amputation a while ago with Dr. Vick.  Not sure how long he has had the wound. Normally numb but has pain to right foot 5/10.     Review of Systems:  Patient denies fever, chills, rash,  stiffness, limping, weakness, heart burn, blood in stool, chest pain with activity, calf pain when walking, shortness of breath with activity, chronic cough, easy bleeding/bruising, swelling of ankles, excessive thirst, fatigue, depression, anxiety.  Patient admits to wound, numbness..     PAST MEDICAL HISTORY:   Past Medical History:   Diagnosis Date     Arthritis      ASCVD (arteriosclerotic cardiovascular disease)      BMI 30.0-30.9,adult      BPH (benign prostatic hypertrophy)      Cellulitis      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)      Coronary artery disease     triple bypass 1995     Diabetes mellitus (H)      Diabetic polyneuropathy (H)      History of blood transfusion      Hyperlipidaemia      Hypertension      Noninflammatory pericardial effusion      Osteomyelitis of left foot (H)      PVD (peripheral vascular disease) (H)      Sebaceous cyst         PAST SURGICAL HISTORY:   Past Surgical History:   Procedure Laterality Date     AMPUTATE TOE(S)  1/10/2014    Procedure: AMPUTATE TOE(S);;  Surgeon: Amador Vick MD;  Location:  OR     BONE MARROW BIOPSY, BONE SPECIMEN, NEEDLE/TROCAR  10/19/2012    Procedure: BIOPSY BONE MARROW;  BONE MARROW BIOPSY  (CONSCIOUS SED);  Surgeon: Ramon Quesada MD;  Location:  GI     BYPASS GRAFT FEMOROPOPLITEAL  1/10/2014    Procedure: BYPASS GRAFT FEMOROPOPLITEAL;  Left above knee popliteal to  Below knee popliteal bypass using transverse saphenous vein  graft. Left 2nd Toe amputation;  Surgeon: Amador Vick MD;  Location:  OR     CARDIAC SURGERY      angioplasty with stent, triple bypass     EXCISE CYST GENERIC (LOCATION) N/A 2/1/2016    Procedure: EXCISE CYST GENERIC (LOCATION);  Surgeon: Amador Vick MD;  Location:  [facility]     GRAFT VEIN FROM EXTREMITY (LOCATION)  1/10/2014    Procedure: GRAFT VEIN FROM EXTREMITY (LOCATION);;  Surgeon: Amador Vick MD;  Location: [facility] OR     LAMINECTOMY THORACIC ONE LEVEL N/A 2/8/2017    Procedure: LAMINECTOMY THORACIC ONE LEVEL;  Surgeon: Marcelo Trent MD;  Location: [facility] OR     OPTICAL TRACKING SYSTEM FUSION POSTERIOR SPINE THORACIC THREE+ LEVELS N/A 2/12/2017    Procedure: OPTICAL TRACKING SYSTEM FUSION POSTERIOR SPINE THORACIC THREE+ LEVELS;  Surgeon: Marcelo Trent MD;  Location: [facility] OR     TONSILLECTOMY       VASCULAR SURGERY      ptcr legs        MEDICATIONS:   Current Facility-Administered Medications:      acetaminophen (TYLENOL) tablet 650 mg, 650 mg, Oral, Q4H PRN, Trip Ruby DO     atorvastatin (LIPITOR) tablet 20 mg, 20 mg, Oral, Daily, Trip Ruby DO, 20 mg at 10/01/18 0845     cholecalciferol (vitamin D3) tablet 1,000 Units, 1,000 Units, Oral, Daily, Trip Ruby DO, 1,000 Units at 10/01/18 0845     citalopram (celeXA) tablet 10 mg, 10 mg, Oral, Daily, Trip Ruby DO, 10 mg at 10/01/18 0845     clopidogrel (PLAVIX) tablet 75 mg, 75 mg, Oral, Daily, Trip Ruby DO, 75 mg at 10/01/18 0845     glucose gel 15-30 g, 15-30 g, Oral, Q15 Min PRN **OR** dextrose 50 % injection 25-50 mL, 25-50 mL, Intravenous, Q15 Min PRN **OR** glucagon injection 1 mg, 1 mg, Subcutaneous, Q15 Min PRN, Trip Ruby DO     insulin aspart (NovoLOG) inj (RAPID ACTING), 1-7 Units, Subcutaneous, TID AC, Trip Ruby DO     insulin aspart (NovoLOG) inj (RAPID ACTING), 1-5 Units, Subcutaneous, At Bedtime, Trip Ruby,  DO, 3 Units at 10/01/18 0025     melatonin tablet 1 mg, 1 mg, Oral, At Bedtime PRN, Trip Ruby DO     melatonin tablet 5 mg, 5 mg, Oral, At Bedtime, Trip Ruby, , 5 mg at 10/01/18 0024     metoprolol succinate (TOPROL-XL) 24 hr tablet 100 mg, 100 mg, Oral, Daily, Trip Ruby, , 100 mg at 10/01/18 0024     naloxone (NARCAN) injection 0.1-0.4 mg, 0.1-0.4 mg, Intravenous, Q2 Min PRN, Trip Ruby DO     ondansetron (ZOFRAN-ODT) ODT tab 4 mg, 4 mg, Oral, Q6H PRN **OR** ondansetron (ZOFRAN) injection 4 mg, 4 mg, Intravenous, Q6H PRN, Trip Ruby DO     oxyCODONE IR (ROXICODONE) tablet 5-10 mg, 5-10 mg, Oral, Q3H PRN, Trip Ruby DO     piperacillin-tazobactam (ZOSYN) 3.375 g vial to attach to  mL bag, 3.375 g, Intravenous, Q6H, Trip Ruby DO, 3.375 g at 10/01/18 0647     ranitidine (ZANTAC) tablet 150 mg, 150 mg, Oral, BID PRN, Trip Ruby DO     senna-docusate (SENOKOT-S;PERICOLACE) 8.6-50 MG per tablet 1 tablet, 1 tablet, Oral, BID PRN **OR** senna-docusate (SENOKOT-S;PERICOLACE) 8.6-50 MG per tablet 2 tablet, 2 tablet, Oral, BID PRN, Trip Ruby DO     vancomycin (VANCOCIN) 1,750 mg in sodium chloride 0.9 % 500 mL intermittent infusion, 1,750 mg, Intravenous, Q24H, Trip Ruby DO, Last Rate: 125 mL/hr at 10/01/18 0025, 1,750 mg at 10/01/18 0025     ALLERGIES:    Allergies   Allergen Reactions     Blood Transfusion Related (Informational Only) Other (See Comments)     Patient has a history of a clinically significant antibody against RBC antigens.  A delay in compatible RBCs may occur.         SOCIAL HISTORY:   Social History     Social History     Marital status: Single     Spouse name: N/A     Number of children: N/A     Years of education: N/A     Occupational History     Not on file.     Social History Main Topics     Smoking status: Former Smoker     Packs/day: 2.00     Years:  30.00     Types: Cigarettes     Quit date: 6/19/1995     Smokeless tobacco: Never Used     Alcohol use No     Drug use: No     Sexual activity: Not on file     Other Topics Concern     Not on file     Social History Narrative    Lives independently with SO. 2/9/2017         FAMILY HISTORY:   Family History   Problem Relation Age of Onset     Cancer Mother      leukemia        EXAM:Vitals: /54 (BP Location: Left arm)  Pulse 82  Temp 99.2  F (37.3  C) (Oral)  Resp 18  Ht 6' (1.829 m)  Wt 177 lb 8 oz (80.5 kg)  SpO2 97%  BMI 24.07 kg/m2  BMI= Body mass index is 24.07 kg/(m^2).    LABS:    WBC   Date Value Ref Range Status   10/01/2018 13.3 (H) 4.0 - 11.0 10e9/L Final     Lactic acid: 2.1    HA1C: 9.0 (9/2018)  ESR: 67  CRP: 208.0    INR: 2.75    General appearance: Patient is alert and fully cooperative with history & exam.  No sign of distress is noted during the visit.      Psychiatric: Affect is pleasant & appropriate.  Patient appears motivated to improve health.       Respiratory: Breathing is regular & unlabored while sitting.      HEENT: Hearing is intact to spoken word.  Speech is clear.  No gross evidence of visual impairment that would impact ambulation.       Dermatologic: full thickness ulceration to the plantar aspect of the right 5th metatarsal head that measures 1.0cm x 1.0cm x 0.4cm. Probes to bone.  Does track 6 cm across the plantar aspect of the right foot.  Black fluid filled blister to the distal 2/3 of the plantar foot. Upon debridement, ulcer communicates with blister and there is significant purulent drainage and malodor noted.      Vascular: DP & PT pulses are not palpable on the right foot.  edema noted to right plantar foot, arch and up the medial ankle.  Skin temp to right foot is warm.      Neurologic: Lower extremity sensation is diminished.      Musculoskeletal: Patient is ambulatory without assistive device or brace. Previous left 2nd toe amputation.     IMAGING: MRI-  Plantar lateral skin ulcer at the level of the fifth metatarsal  head. Devitalized soft tissue thickening distal to the ulcer in the  plantar fifth toe.  2. Nonspecific mild bone marrow edema in the fifth metatarsal head and  proximal phalanx base may be reactive change alone but early  osteomyelitis is not excluded.  3. Probable postoperative changes in the soft tissues of the distal  forefoot more medially.     WALLACE's :  pending     ASSESSMENT: 77 yr old diabetic male with diabetic right foot infection, osteomyelitis right foot, PVD,      PLAN:  Reviewed patient's chart in epic.  -had long discussion with patient about his right foot and the MRI results.   -debrided the wound at bedside.   - given the extent of the necrotic tissue to the bottom of the foot, Patient would need almost all the skin debrided off. We would not be able to close the wound even with an transmetatarsal amputation or be able to give him something functional to walk on. Also given his history of PVD and non palpable pulses on exam, I am concerned he would not heal any salvage attempt.  Therefore I am recommending a below knee amputation.   -did put in a vascular referral and discussed this with them. He would likely need his INR reversed or down to 1.2 before surgical intervention but will defer that to vascular.   - Spoke with Tad who verbalizes understanding.   -recommend non weight bearing right foot.   -At this point, podiatry will sign off. Please call with further questions or concerns.       Kathi Nye DPM, Podiatry/Foot and Ankle Surgery    10:10 AM

## 2018-10-01 NOTE — PROGRESS NOTES
Admission    Patient arrives to room 612-1 via cart from direct admit.  Care plan note: YES    Inpatient nursing criteria listed below were met:    PCD's Documented: Yes  Skin issues/needs documented :Yes  Isolation education started/completed NA  Patient allergies verified with patient: Yes  Verified completion of [facility] Risk Assessment Tool:  Yes  Verified completion of Guardianship screening tool: Yes  Fall Prevention: Care plan updated, Education given and documented Yes  Care Plan initiated: Yes  Home medications documented in belongings flowsheet: NA  Patient belongings documented in belongings flowsheet: Yes  Reminder note (belongings/ medications) placed in discharge instructions:Yes  Admission profile/ required documentation complete: Yes

## 2018-10-01 NOTE — PROGRESS NOTES
[facility]    Sepsis Evaluation Progress Note    Date of Service: 09/30/2018    I was called to see Tad Manning due to abnormal vital signs triggering the Sepsis SIRS screening alert. He is known to have an infection.     Physical Exam    Vital Signs:  Temp: 98.8  F (37.1  C) Temp src: Oral BP: 128/61 Pulse: 103   Resp: 18 SpO2: 99 % (98.9- computer changing to 99) O2 Device: None (Room air)      Lab:  Lactic Acid   Date Value Ref Range Status   09/30/2018 2.1 (H) 0.7 - 2.0 mmol/L Final       The patient is at baseline mental status.    The rest of their physical exam is significant for refer to hospitalist H&P performed about an hour ago    Assessment and Plan    The SIRS and exam findings are likely due to   sepsis.     ID: The patient is currently on the following antibiotics:  Anti-infectives (Future)    Start     Dose/Rate Route Frequency Ordered Stop    10/01/18 0000  vancomycin (VANCOCIN) 1,750 mg in sodium chloride 0.9 % 500 mL intermittent infusion      1,750 mg  over 2 Hours Intravenous EVERY 24 HOURS 09/30/18 2235      09/30/18 2215  piperacillin-tazobactam (ZOSYN) 3.375 g vial to attach to  mL bag     Comments:  Pharmacy can adjust dose based on renal function.    3.375 g  over 30 Minutes Intravenous EVERY 6 HOURS 09/30/18 2204          Current antibiotic coverage is appropriate for source of infection.    Fluid: Fluid bolus not indicated due to hemodynamically stable.    Lab: Repeat lactic acid is not indicated.    Disposition: The patient will remain on the current unit. We will continue to monitor this patient closely.    OFELIA Leyva, CNP  Hospitalist - [facility]  Text Page  [phone])

## 2018-10-01 NOTE — PLAN OF CARE
Problem: Patient Care Overview  Goal: Plan of Care/Patient Progress Review  Outcome: No Change  AO4, VSS on r/a.  Up with 1 and walker to chair for meals.  Mod carb diet, good appetite.  Hyperglycemia today, , 354. Coverage given.  MRI done this am, see results.  Zosyn given.  Family updated.  Right foot dressing CD&I.

## 2018-10-01 NOTE — CONSULTS
[facility]    Vascular Surgery Note    Date of Admission:  9/30/2018    Assessment & Plan   Tad Manning is a 77 year old male who was admitted on 9/30/2018. I was asked to see the patient for diabetic right foot infection. Patient had previous left LE bypass with Dr. Vick in 2014. He has had worsening right foot wounds. Currently he has open draining wound on his right foot with diminished WALLACE 0.6 and non-palpable distal pulse. Elevated WBC and hyperglycemic. Patient also recently had drug eluting cardiac stents placed in 3/2018. He is also on coumadin for a-fib     -Severe right leg peripheral arterial disease with extensive full-thickness ulceration and blistering of the plantar aspect of the right forefoot.  This probes to bone.  I agree with Dr. Nye that given the magnitude of this necrotic process this is a non-salvageable right foot.  Transmetatarsal amputation is not a realistic option.  I feel he is far better served by a right below-knee amputation.  I will likely do this in a two-stage manner.  Tentatively plan an open right lower leg amputation just above the ankle  this Thursday.  Continue appropriate antibiotic coverage.  Plan to formalize or close the amputation sometime early next week.    Principal Problem:    Probable Osteomyelitis Right Foot  Active Problems:    DM type 2 w neuropathy -- Hgb A1C 9.0 on 9/22/18    HTN (hypertension)    CAD -- S/P CABG    Osteomyelitis left 2nd Toe s/p amputaion 1/10/14    CRF (chronic renal failure), stage 3 (moderate)    Chronic systolic heart failure (H)    Atrial fibrillation -- on Warfarin    Hyponatremia      Sam Silva MD    Code Status    Full Code    Reason for Consult   Right foot wound     History of Present Illness   Tad Manning is a 77 year old male known to vascular surgery service for bilateral PAD. Patient has a history of LLE AK-BK popliteal bypass in 2014 with Dr. Vick. He was following up with Dr. Vick until  2016. Patient had documented ulcer at the right 5th toe at that time, and was following a podiatrist. He says that he has had this new wound to the bottom of his foot for at least 6-8wks. He cannot remember any trauma to the foot.     Past Medical History   I have reviewed this patient's medical history and updated it with pertinent information if needed.   Past Medical History:   Diagnosis Date     Arthritis      ASCVD (arteriosclerotic cardiovascular disease)      BMI 30.0-30.9,adult      BPH (benign prostatic hypertrophy)      Cellulitis      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)      Coronary artery disease     triple bypass 1995     Diabetes mellitus (H)      Diabetic polyneuropathy (H)      History of blood transfusion      Hyperlipidaemia      Hypertension      Noninflammatory pericardial effusion      Osteomyelitis of left foot (H)      PVD (peripheral vascular disease) (H)      Sebaceous cyst        Past Surgical History   I have reviewed this patient's surgical history and updated it with pertinent information if needed.  Past Surgical History:   Procedure Laterality Date     AMPUTATE TOE(S)  1/10/2014    Procedure: AMPUTATE TOE(S);;  Surgeon: Amador Vick MD;  Location:  OR     BONE MARROW BIOPSY, BONE SPECIMEN, NEEDLE/TROCAR  10/19/2012    Procedure: BIOPSY BONE MARROW;  BONE MARROW BIOPSY  (CONSCIOUS SED);  Surgeon: Ramon Quesada MD;  Location:  GI     BYPASS GRAFT FEMOROPOPLITEAL  1/10/2014    Procedure: BYPASS GRAFT FEMOROPOPLITEAL;  Left above knee popliteal to  Below knee popliteal bypass using transverse saphenous vein graft. Left 2nd Toe amputation;  Surgeon: Amador Vcik MD;  Location:  OR     CARDIAC SURGERY      angioplasty with stent, triple bypass     EXCISE CYST GENERIC (LOCATION) N/A 2/1/2016    Procedure: EXCISE CYST GENERIC (LOCATION);  Surgeon: Amador Vick MD;  Location:  SD     GRAFT VEIN FROM EXTREMITY  (LOCATION)  1/10/2014    Procedure: GRAFT VEIN FROM EXTREMITY (LOCATION);;  Surgeon: Amador Vick MD;  Location: [facility] OR     LAMINECTOMY THORACIC ONE LEVEL N/A 2/8/2017    Procedure: LAMINECTOMY THORACIC ONE LEVEL;  Surgeon: Marcelo Trent MD;  Location: [facility] OR     OPTICAL TRACKING SYSTEM FUSION POSTERIOR SPINE THORACIC THREE+ LEVELS N/A 2/12/2017    Procedure: OPTICAL TRACKING SYSTEM FUSION POSTERIOR SPINE THORACIC THREE+ LEVELS;  Surgeon: Marcelo Trent MD;  Location: [facility] OR     TONSILLECTOMY       VASCULAR SURGERY      ptcr legs       Prior to Admission Medications   Prior to Admission Medications   Prescriptions Last Dose Informant Patient Reported? Taking?   Citalopram Hydrobromide (CELEXA PO) 9/30/2018 at AM  Yes Yes   Sig: Take 10 mg by mouth daily   MELATONIN PO 9/29/2018 at HS  Yes Yes   Sig: Take 5 mg by mouth daily   acetaminophen (TYLENOL) 325 MG tablet  at PRN  Yes Yes   Sig: Take 325 mg by mouth every 4 hours as needed for mild pain   atorvastatin (LIPITOR) 20 MG tablet 9/30/2018 at AM  No Yes   Sig: TAKE 1 TABLET BY MOUTH EVERY DAY   cholecalciferol 1000 UNITS TABS 9/30/2018 at AM  No Yes   Sig: Take 1,000 Units by mouth daily   clopidogrel (PLAVIX) 75 MG tablet  at AM vs PM?  No Yes   Sig: Take 1 tablet (75 mg) by mouth daily   furosemide (LASIX) 20 MG tablet 9/30/2018 at AM  No Yes   Sig: Take 2 tablets (40 mg) by mouth daily   hydrocortisone (CORTIZONE-10) 1 % ointment  at PRN  Yes Yes   Sig: Apply topically 2 times daily as needed for itching   metFORMIN (GLUCOPHAGE) 500 MG tablet 9/30/2018 at Unknown time  No Yes   Sig: Take 1 tablet (500 mg) by mouth 2 times daily (with meals)   metoprolol succinate (TOPROL-XL) 200 MG 24 hr tablet 9/29/2018 at PM  No Yes   Sig: Take 0.5 tablets (100 mg) by mouth daily   nitroglycerin (NITROSTAT) 0.4 MG sublingual tablet  at PRN  No Yes   Sig: For chest pain place 1 tablet under the tongue every 5 minutes for 3 doses. If symptoms persist 5 minutes  after 1st dose call 911.   nystatin (MYCOSTATIN) cream  at PRN  Yes Yes   Sig: Apply topically daily as needed    order for DME  Self No No   Sig: Equipment being ordered: DH2 shoe   ranitidine (ZANTAC) 150 MG tablet  at PRN  No Yes   Sig: Take 1 tablet (150 mg) by mouth 2 times daily   Patient taking differently: Take 150 mg by mouth 2 times daily as needed for heartburn    sodium chloride (EQ SALINE NASAL SPRAY) 0.65 % nasal spray  at PRN  No Yes   Sig: Spray 1 spray into both nostrils daily as needed for congestion   warfarin (COUMADIN) 5 MG tablet 9/29/2018 at Unknown time  No Yes   Sig: Take 0.5 tablets tonight 9/24 and 1 tab tomorrow night 9/25. INR check on Wednesday 9/26 and continue as directed by coumadin clinic.      Facility-Administered Medications: None     Allergies   Allergies   Allergen Reactions     Blood Transfusion Related (Informational Only) Other (See Comments)     Patient has a history of a clinically significant antibody against RBC antigens.  A delay in compatible RBCs may occur.        Social History     Physical Exam   Temp: 98.5  F (36.9  C) Temp src: Oral BP: 110/49 Pulse: 85   Resp: 16 SpO2: 99 % O2 Device: None (Room air)    Vital Signs with Ranges  Temp:  [98.3  F (36.8  C)-99.4  F (37.4  C)] 98.5  F (36.9  C)  Pulse:  [] 85  Resp:  [16-18] 16  BP: (100-128)/(49-83) 110/49  SpO2:  [95 %-99 %] 99 %  177 lbs 8 oz    PE:   NAD, awake and alert  Chest: S1 S2 present, RRR, no wheezing   Abd: soft, NT, ND, no rebound or guarding  Ex: Palpable bilateral femoral pulse. L>R. Non-palpable distal DP/PT pulses on the right. Doppler multiphasic distal signals. Small 1cm ulcer to the lateral right 5th toe. 1-2cm open wound at middle plantar surface of the right foot with necrotic tissue and malodor drainage. No wounds on the left foot.     Data   CBC RESULTS:   Recent Labs   Lab Test  10/01/18   0852   WBC  13.3*   RBC  2.72*   HGB  8.6*   HCT  24.5*   MCV  90   MCH  31.6   MCHC  35.1   RDW   14.3   PLT  97*     Last Comprehensive Metabolic Panel:  Sodium   Date Value Ref Range Status   10/01/2018 128 (L) 133 - 144 mmol/L Final     Potassium   Date Value Ref Range Status   10/01/2018 4.3 3.4 - 5.3 mmol/L Final     Chloride   Date Value Ref Range Status   10/01/2018 94 94 - 109 mmol/L Final     Carbon Dioxide   Date Value Ref Range Status   10/01/2018 22 20 - 32 mmol/L Final     Anion Gap   Date Value Ref Range Status   10/01/2018 12 3 - 14 mmol/L Final     Glucose   Date Value Ref Range Status   10/01/2018 295 (H) 70 - 99 mg/dL Final     Urea Nitrogen   Date Value Ref Range Status   10/01/2018 32 (H) 7 - 30 mg/dL Final     Creatinine   Date Value Ref Range Status   10/01/2018 1.60 (H) 0.66 - 1.25 mg/dL Final     GFR Estimate   Date Value Ref Range Status   10/01/2018 42 (L) >60 mL/min/1.7m2 Final     Comment:     Non  GFR Calc     Calcium   Date Value Ref Range Status   10/01/2018 8.2 (L) 8.5 - 10.1 mg/dL Final

## 2018-10-01 NOTE — H&P
[facility]    History and Physical  Hospitalist       Date of Admission:  9/30/2018    Assessment & Plan   aTd Manning is a 77 year old male with a history of osteomyelitis of left foot, PVD, CAD s/p 3 vessel CABG and recent BHAVNA x2 in March, DM type II w/polyneuropathy and atrial flutter on Coumadin for anticoagulation who presented with     Concern for osteomyelitis of right foot w/sepsis  H/o Osteomyelitis of left foot  Bilateral foot wounds   Patient presented to [facility] due to a non-healing right foot ulcer in the setting of prior osteomyelitis to the left foot.  At outside hospital ESR was 101 and CRP was 20.56 concerning for osteomyelitis.  Patient was sent here from [facility] for further evaluation   Patient also met 2/4 SIRS criteria with an elevated WBC of 14.5 and tachycardia   - Admission to medical bed  - Blood cultures x2  - Lactic acid ordered  - IV antibiotics with Zosyn and Vancomycin for now   - Tylenol and Oxycodone PRN   - MRI right foot  - WOC nurse consulted  - Podiatry consulted    CAD s/p CABG  PVD  Atrial flutter   Patient actually underwent a LHC on 3/6/18 and had BHAVNA x2 placed to the LAD.  He is currently on Plavix and Coumadin due to his atrial flutter.  At this time no acute issues and patient has adequate rate control   - Currently holding Coumadin due to possible surgical evaluation and will bridge with Heparin   - Continue PTA Plavix given recent stenting   - PTA Toprol  mg   - PTA Lipitor 20 mg     DM type II w/polyneuropathy   PTA on Metformin 500 mg BID.  Last HgbA1c from 9/22/18 was 9.0.   - Medium intensity SSI   - Moderate CHO diet      CKD Stage III  Baseline Cr appears to be 1.4-1.7 and was 1.7 at [facility]  - Avoid nephrotoxins  - Continue to monitor     Mild hyponatremia   Patient's sodium was 126 at [facility] but suspect this may be more pseudohyponatremia as the patient's blood glucose was 395.  When corrected his sodium was only 131  which appears to be near his baseline of 133-134  - Repeat renal panel in the AM     Chronic anemia  Hgb was 8.6 and baseline appears to be 10.  Suspect this may be anemia of chronic disease  - Continue to monitor    - Iron studies ordered     DVT Prophylaxis: Heparin drip  Code Status: Full Code    Disposition: Expected discharge TBD.  Still requires surgical evaluation.     Trip Ruby DO    Primary Care Physician   Gene Guevara    Chief Complaint   Right foot wound    History is obtained from the patient    History of Present Illness   Tad Manning is a 77 year old male with a history of osteomyelitis of left foot, PVD, CAD s/p 3 vessel CABG and recent BHAVNA x2 in March, DM type II w/polyneuropathy who presented to [facility] a right foot wound.  Patient states he first noticed the wound 6-8 weeks ago and came in to the ED today as it was not improving.  Patient has not noticed any swelling, erythema or pain to the area.   He also has not had any fevers, chills, chest pain, SOB or light headedness.  Denies any abdominal pain, N/V/D or problems with urination.      Past Medical History    I have reviewed this patient's medical history and updated it with pertinent information if needed.   Past Medical History:   Diagnosis Date     Arthritis      ASCVD (arteriosclerotic cardiovascular disease)      BMI 30.0-30.9,adult      BPH (benign prostatic hypertrophy)      Cellulitis      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)      Coronary artery disease     triple bypass 1995     Diabetes mellitus (H)      Diabetic polyneuropathy (H)      History of blood transfusion      Hyperlipidaemia      Hypertension      Noninflammatory pericardial effusion      Osteomyelitis of left foot (H)      PVD (peripheral vascular disease) (H)      Sebaceous cyst    Atrial flutter    Past Surgical History   I have reviewed this patient's surgical history and updated it with pertinent  information if needed.  Past Surgical History:   Procedure Laterality Date     AMPUTATE TOE(S)  1/10/2014    Procedure: AMPUTATE TOE(S);;  Surgeon: Amador Vick MD;  Location:  OR     BONE MARROW BIOPSY, BONE SPECIMEN, NEEDLE/TROCAR  10/19/2012    Procedure: BIOPSY BONE MARROW;  BONE MARROW BIOPSY  (CONSCIOUS SED);  Surgeon: Ramon Quesada MD;  Location:  GI     BYPASS GRAFT FEMOROPOPLITEAL  1/10/2014    Procedure: BYPASS GRAFT FEMOROPOPLITEAL;  Left above knee popliteal to  Below knee popliteal bypass using transverse saphenous vein graft. Left 2nd Toe amputation;  Surgeon: Amdaor Vick MD;  Location:  OR     CARDIAC SURGERY      angioplasty with stent, triple bypass     EXCISE CYST GENERIC (LOCATION) N/A 2/1/2016    Procedure: EXCISE CYST GENERIC (LOCATION);  Surgeon: Amador Vick MD;  Location:  [facility]     GRAFT VEIN FROM EXTREMITY (LOCATION)  1/10/2014    Procedure: GRAFT VEIN FROM EXTREMITY (LOCATION);;  Surgeon: Amador Vick MD;  Location:  OR     LAMINECTOMY THORACIC ONE LEVEL N/A 2/8/2017    Procedure: LAMINECTOMY THORACIC ONE LEVEL;  Surgeon: Marcelo Trent MD;  Location: [facility] OR     OPTICAL TRACKING SYSTEM FUSION POSTERIOR SPINE THORACIC THREE+ LEVELS N/A 2/12/2017    Procedure: OPTICAL TRACKING SYSTEM FUSION POSTERIOR SPINE THORACIC THREE+ LEVELS;  Surgeon: Marcelo Trent MD;  Location: [facility] OR     TONSILLECTOMY       VASCULAR SURGERY      ptcr legs       Prior to Admission Medications   Prior to Admission Medications   Prescriptions Last Dose Informant Patient Reported? Taking?   Citalopram Hydrobromide (CELEXA PO)   Yes No   Sig: Take 10 mg by mouth daily   MELATONIN PO   Yes No   Sig: Take 5 mg by mouth daily   acetaminophen (TYLENOL) 325 MG tablet   Yes No   Sig: Take 325 mg by mouth every 4 hours as needed for mild pain   atorvastatin (LIPITOR) 20 MG tablet   No No   Sig: TAKE 1 TABLET BY MOUTH EVERY DAY   cholecalciferol 1000 UNITS TABS   No No   Sig:  Take 1,000 Units by mouth daily   clopidogrel (PLAVIX) 75 MG tablet   No No   Sig: Take 1 tablet (75 mg) by mouth daily   furosemide (LASIX) 20 MG tablet   No No   Sig: Take 2 tablets (40 mg) by mouth daily   metFORMIN (GLUCOPHAGE) 500 MG tablet   No No   Sig: Take 1 tablet (500 mg) by mouth 2 times daily (with meals)   metoprolol succinate (TOPROL-XL) 200 MG 24 hr tablet   No No   Sig: Take 0.5 tablets (100 mg) by mouth daily   nitroglycerin (NITROSTAT) 0.4 MG sublingual tablet   No No   Sig: For chest pain place 1 tablet under the tongue every 5 minutes for 3 doses. If symptoms persist 5 minutes after 1st dose call 911.   nystatin (MYCOSTATIN) cream   Yes No   Sig: Apply topically 2 times daily   order for DME  Self No No   Sig: Equipment being ordered: DH2 shoe   ranitidine (ZANTAC) 150 MG tablet   No No   Sig: Take 1 tablet (150 mg) by mouth 2 times daily   sodium chloride (EQ SALINE NASAL SPRAY) 0.65 % nasal spray   No No   Sig: Spray 1 spray into both nostrils daily as needed for congestion   warfarin (COUMADIN) 5 MG tablet   No No   Sig: Take 0.5 tablets tonight 9/24 and 1 tab tomorrow night 9/25. INR check on Wednesday 9/26 and continue as directed by coumadin clinic.      Facility-Administered Medications: None     Allergies   Allergies   Allergen Reactions     Blood Transfusion Related (Informational Only) Other (See Comments)     Patient has a history of a clinically significant antibody against RBC antigens.  A delay in compatible RBCs may occur.        Social History   I have reviewed this patient's social history and updated it with pertinent information if needed. Tad Manning  reports that he quit smoking about 23 years ago. His smoking use included Cigarettes. He has a 60.00 pack-year smoking history. He has never used smokeless tobacco. He reports that he does not drink alcohol or use illicit drugs.    Family History   I have reviewed this patient's family history and updated it with  pertinent information if needed.   Family History   Problem Relation Age of Onset     Cancer Mother      leukemia       Review of Systems   The 10 point Review of Systems is negative other than noted in the HPI    Physical Exam   Temp: 98.8  F (37.1  C) Temp src: Oral BP: 128/61 Pulse: 103   Resp: 18 SpO2: 99 % (98.9- computer changing to 99) O2 Device: None (Room air)    Vital Signs with Ranges  Temp:  [98.8  F (37.1  C)] 98.8  F (37.1  C)  Pulse:  [103] 103  Resp:  [18] 18  BP: (128)/(61) 128/61  SpO2:  [99 %] 99 %  177 lbs 8 oz    Constitutional: Awake, alert, cooperative, no apparent distress.  Eyes: Conjunctiva and pupils examined and normal.  HEENT: Moist mucous membranes, normal dentition.  Respiratory: Clear to auscultation bilaterally, no crackles or wheezing.  Cardiovascular: Tachycardiac, normal S1 and S2, and no murmur noted.  GI: Soft, non-distended, non-tender, normal bowel sounds.  Lymph/Hematologic: No anterior cervical or supraclavicular adenopathy.  Skin: No rashes, no cyanosis, no edema.  Musculoskeletal: Right foot is wrapped.  No joint swelling, erythema or tenderness.  Neurologic: Cranial nerves 2-12 intact, normal strength and sensation.  Psychiatric: Alert, oriented to person, place and time, no obvious anxiety or depression.    Data   Data reviewed today:  I personally reviewed no images or EKG's today.    Recent Labs  Lab 09/27/18  1250 09/27/18  1001 09/24/18  0452   INR 1.40* 1.3 3.08*   *  --  134   POTASSIUM 5.0  --  4.5   CHLORIDE 95  --  103   CO2 22  --  23   BUN 47*  --  55*   CR 1.76*  --  1.49*   ANIONGAP 12  --  8   JUSTINO 9.4  --  9.1   *  --  215*       Imaging:  No results found for this or any previous visit (from the past 24 hour(s)).

## 2018-10-01 NOTE — PROGRESS NOTES
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 Wound appears significantly worse than what was documented on 18 @ [facility]  Status: initial assessment. Await DPM assessment    Buttocks due to candidiasis     Treatment Plan  Right foot wound: Daily cleanse with microklenz and pat dry.  Apply dab of iodosorb (order # [id number]) to wound beds and cover polymen, Secure Kerlix wrap.       Orders Written   Mahsa antifungal to buttocks twice daily  WO Nurse follow-up plan: prn as needed following DPM assessment      Patient History  According to provider note(s): Tad Manning is a 77 year old male with a history of osteomyelitis of left foot, PVD, CAD s/p 3 vessel CABG and recent BHAVNA x2 in March, DM type II w/polyneuropathy and atrial flutter on Coumadin for anticoagulation who presented with      Concern for osteomyelitis of right foot w/sepsis  H/o Osteomyelitis of left foot  Bilateral foot wounds   Patient presented to [facility] due to a non-healing right foot ulcer in the setting of prior osteomyelitis to the left foot.  At outside hospital ESR was 101 and CRP was 20.56 concerning for osteomyelitis.  Patient was sent here from [facility] for further evaluation   Patient also met 2/4 SIRS criteria with an elevated WBC of 14.5 and tachycardia   - Admission to medical bed  - Blood cultures x2  - Lactic acid ordered    Objective Data  Containment of urine/stool: Incontinence Protocol    Active Diet Order  Active Diet Order      Moderate Consistent CHO Diet    Output:   I/O last 3 completed shifts:  In: -   Out: 50 [Urine:50]    Risk Assessment:   Roberto Score  Av.3  Min: 18  Max: 20                           Labs:   Recent Labs   Lab Test  10/01/18   0852   18   0710  18   0042   ALBUMIN   --    --    --   4.1   HGB  8.6*   < >   --   10.4*   INR  2.75*   < >  4.95*  4.00*   WBC   "13.3*   < >   --   7.9   A1C   --    --   9.0*   --    CRP  208.0*   --    --    --     < > = values in this interval not displayed.               Physical Exam  Skin inspection: focused buttocks and right foot    Wound Location:  Right distal plantar foot and Rt posterior base 5th met head  Wound History: Note from 9-24-28 by WOC reviewed.   Right plantar 5th met head  (length x width x depth, in cm) 0.9 x 0.9 x 0.5 cm with bone palpable  Rt posterior base 5th toe\" .5cm x .5cm x superficial with 100% red base  Wound Base:  Mix slough and red base  Wound undermining; distal wound undermines toward base of toe wounds and medially toward new large area of black ecchymosis  Palpation of the wound bed: normal   Periwound skin: new areas of light ecchymosis on plantar surface of foot. Arch of foot with 7.0cm x 1.5cm area of  Black ecchymosis with skin intact  Temperature: cool  Drainage:, small sero-sang  Description of drainage: serosanguinous  Odor: none  Pain: absent     Buttocks: erythema with mirrored superficial peeling skin  and light satellite lesions.  Scar on buttocks consistent with healed friction or pressure injury.  Pt denies burning or itching, however appearance is consistent with fungal infection.      Interventions  Current support surface:  Atmos Air mattress  Current off-loading measures: Pillows under calves  Visual inspection of wound(s) completed  Wound Care: completed by RN  Supplies: await DPM assessment  Education provided: importance of repositioning and plan of care  Discussed plan of care with Patient    LUISANA Hurley  Will follow-up for chart check in am for DPM POC      "

## 2018-10-01 NOTE — DISCHARGE INSTRUCTIONS
"Belongings at bedside: clothing;glasses;wallet;watch;walker; cell phone/electronics;dental appliance/dentures;money (\"about $100\" does not want to be sent to security )  "

## 2018-10-01 NOTE — PROVIDER NOTIFICATION
MD Notification    Notified Person: MD    Notified Person Name: Dr. He    Notification Date/Time: 10/1/2018 0400    Notification Interaction: Page and callback    Purpose of Notification: IV Vanco Extravasation    Orders Received: Start IV in other arm and continue to run vanco    Comments:  Pt reports no pain to or around site, no lumps, no redness, no moisture around area, no leaking of fluid, skin marked where edematous, pt asymptomatic at this time

## 2018-10-02 LAB
CREAT SERPL-MCNC: 1.5 MG/DL (ref 0.66–1.25)
GFR SERPL CREATININE-BSD FRML MDRD: 46 ML/MIN/1.7M2
GLUCOSE BLDC GLUCOMTR-MCNC: 184 MG/DL (ref 70–99)
GLUCOSE BLDC GLUCOMTR-MCNC: 185 MG/DL (ref 70–99)
GLUCOSE BLDC GLUCOMTR-MCNC: 207 MG/DL (ref 70–99)
GLUCOSE BLDC GLUCOMTR-MCNC: 244 MG/DL (ref 70–99)
GLUCOSE BLDC GLUCOMTR-MCNC: 270 MG/DL (ref 70–99)
INR PPP: 1.44 (ref 0.86–1.14)
LMWH PPP CHRO-ACNC: 0.14 IU/ML
LMWH PPP CHRO-ACNC: <0.1 IU/ML
SODIUM SERPL-SCNC: 131 MMOL/L (ref 133–144)

## 2018-10-02 PROCEDURE — 36415 COLL VENOUS BLD VENIPUNCTURE: CPT | Performed by: HOSPITALIST

## 2018-10-02 PROCEDURE — 84295 ASSAY OF SERUM SODIUM: CPT | Performed by: HOSPITALIST

## 2018-10-02 PROCEDURE — 85520 HEPARIN ASSAY: CPT | Performed by: INTERNAL MEDICINE

## 2018-10-02 PROCEDURE — 12000000 ZZH R&B MED SURG/OB

## 2018-10-02 PROCEDURE — 85610 PROTHROMBIN TIME: CPT | Performed by: INTERNAL MEDICINE

## 2018-10-02 PROCEDURE — 25000128 H RX IP 250 OP 636: Performed by: INTERNAL MEDICINE

## 2018-10-02 PROCEDURE — 85520 HEPARIN ASSAY: CPT | Performed by: HOSPITALIST

## 2018-10-02 PROCEDURE — 25000132 ZZH RX MED GY IP 250 OP 250 PS 637: Mod: GY | Performed by: HOSPITALIST

## 2018-10-02 PROCEDURE — 99233 SBSQ HOSP IP/OBS HIGH 50: CPT | Performed by: HOSPITALIST

## 2018-10-02 PROCEDURE — 25000132 ZZH RX MED GY IP 250 OP 250 PS 637: Mod: GY | Performed by: INTERNAL MEDICINE

## 2018-10-02 PROCEDURE — A9270 NON-COVERED ITEM OR SERVICE: HCPCS | Mod: GY | Performed by: HOSPITALIST

## 2018-10-02 PROCEDURE — 82565 ASSAY OF CREATININE: CPT | Performed by: INTERNAL MEDICINE

## 2018-10-02 PROCEDURE — 00000146 ZZHCL STATISTIC GLUCOSE BY METER IP

## 2018-10-02 PROCEDURE — A9270 NON-COVERED ITEM OR SERVICE: HCPCS | Mod: GY | Performed by: INTERNAL MEDICINE

## 2018-10-02 PROCEDURE — 25000131 ZZH RX MED GY IP 250 OP 636 PS 637: Mod: GY | Performed by: HOSPITALIST

## 2018-10-02 PROCEDURE — 25000131 ZZH RX MED GY IP 250 OP 636 PS 637: Mod: GY | Performed by: INTERNAL MEDICINE

## 2018-10-02 RX ADMIN — MICONAZOLE NITRATE: 2 POWDER TOPICAL at 21:07

## 2018-10-02 RX ADMIN — INSULIN ASPART 3 UNITS: 100 INJECTION, SOLUTION INTRAVENOUS; SUBCUTANEOUS at 08:46

## 2018-10-02 RX ADMIN — INSULIN GLARGINE 20 UNITS: 100 INJECTION, SOLUTION SUBCUTANEOUS at 21:07

## 2018-10-02 RX ADMIN — RANITIDINE 150 MG: 150 TABLET ORAL at 16:15

## 2018-10-02 RX ADMIN — PIPERACILLIN SODIUM,TAZOBACTAM SODIUM 3.38 G: 3; .375 INJECTION, POWDER, FOR SOLUTION INTRAVENOUS at 18:48

## 2018-10-02 RX ADMIN — ATORVASTATIN CALCIUM 20 MG: 20 TABLET, FILM COATED ORAL at 08:18

## 2018-10-02 RX ADMIN — HEPARIN SODIUM 1300 UNITS/HR: 10000 INJECTION, SOLUTION INTRAVENOUS at 14:28

## 2018-10-02 RX ADMIN — MICONAZOLE NITRATE: 2 POWDER TOPICAL at 16:15

## 2018-10-02 RX ADMIN — Medication 4000 UNITS: at 07:59

## 2018-10-02 RX ADMIN — MELATONIN 5 MG TABLET 5 MG: at 21:05

## 2018-10-02 RX ADMIN — Medication 4000 UNITS: at 00:37

## 2018-10-02 RX ADMIN — INSULIN ASPART 1 UNITS: 100 INJECTION, SOLUTION INTRAVENOUS; SUBCUTANEOUS at 22:28

## 2018-10-02 RX ADMIN — PIPERACILLIN SODIUM,TAZOBACTAM SODIUM 3.38 G: 3; .375 INJECTION, POWDER, FOR SOLUTION INTRAVENOUS at 05:51

## 2018-10-02 RX ADMIN — CLOPIDOGREL 75 MG: 75 TABLET, FILM COATED ORAL at 08:18

## 2018-10-02 RX ADMIN — PIPERACILLIN SODIUM,TAZOBACTAM SODIUM 3.38 G: 3; .375 INJECTION, POWDER, FOR SOLUTION INTRAVENOUS at 00:49

## 2018-10-02 RX ADMIN — VANCOMYCIN HYDROCHLORIDE 1750 MG: 5 INJECTION, POWDER, LYOPHILIZED, FOR SOLUTION INTRAVENOUS at 01:38

## 2018-10-02 RX ADMIN — INSULIN ASPART 1 UNITS: 100 INJECTION, SOLUTION INTRAVENOUS; SUBCUTANEOUS at 12:39

## 2018-10-02 RX ADMIN — HEPARIN SODIUM 1000 UNITS/HR: 10000 INJECTION, SOLUTION INTRAVENOUS at 00:36

## 2018-10-02 RX ADMIN — INSULIN GLARGINE 20 UNITS: 100 INJECTION, SOLUTION SUBCUTANEOUS at 00:47

## 2018-10-02 RX ADMIN — METOPROLOL SUCCINATE 100 MG: 100 TABLET, EXTENDED RELEASE ORAL at 21:05

## 2018-10-02 RX ADMIN — INSULIN ASPART 1 UNITS: 100 INJECTION, SOLUTION INTRAVENOUS; SUBCUTANEOUS at 17:04

## 2018-10-02 RX ADMIN — Medication 4000 UNITS: at 16:12

## 2018-10-02 RX ADMIN — PIPERACILLIN SODIUM,TAZOBACTAM SODIUM 3.38 G: 3; .375 INJECTION, POWDER, FOR SOLUTION INTRAVENOUS at 12:25

## 2018-10-02 RX ADMIN — MICONAZOLE NITRATE: 2 POWDER TOPICAL at 08:18

## 2018-10-02 RX ADMIN — CITALOPRAM HYDROBROMIDE 10 MG: 10 TABLET ORAL at 08:18

## 2018-10-02 RX ADMIN — VITAMIN D, TAB 1000IU (100/BT) 1000 UNITS: 25 TAB at 08:18

## 2018-10-02 ASSESSMENT — ACTIVITIES OF DAILY LIVING (ADL)
ADLS_ACUITY_SCORE: 17

## 2018-10-02 NOTE — PROGRESS NOTES
TYSON:  D: Received a message from Mackenzie at [facility].  Patient was opened to [place] on 9/26/18 for RN/PT and OT.  He was recently discharged from [facility] for medical issues.  Reviewing progress notes, noting patient may need a BKA.  If this transpires he will need rehab before going home.    If patient is able to discharge home directly, he will need orders to resume home care.    CT RN or TYSON will be involved for discharge planning and keep [facility]  Intake office updated by calling [phone].

## 2018-10-02 NOTE — PLAN OF CARE
Problem: Patient Care Overview  Goal: Plan of Care/Patient Progress Review  Pt A&Ox4. VSS on RA. Up with 1, walker, GB. Denies pain, nausea, sob. Tolerating mod carb diet. , 315. MD ordered HS lantus; waiting for it to arrive from pharmacy. IV zosyn. Pt assessed by podiatry and vasc surgery; R sabino advised, however vasc surgery wants an angiogram first; see notes. R dressing CDI. Nursing will continue to monitor.

## 2018-10-02 NOTE — PROGRESS NOTES
[facility]  Hospitalist Progress Note   10/02/2018          Assessment and Plan:       Tad Manning is a 77 year old male history of HTN, HFrEF at 20%, HLD, CAD s/p 3-vessel CABG (1995) and PCI to mLAD (3/2018), PAD s/p ileal artery stents, persistent atrial fibrillation, DM type II, CKD stage III, CLL (in remission), BPH, GERD, and anxiety admitted on 9/30/2018 for right foot pain.    Diabetic right foot infection s/p bedside debridement  Osteomyelitis right foot.  Peripheral vascular disease.s/p left LE bypass in 2014  Patient presented to [facility] due to a non-healing right foot ulcer.  Patient was sent to [facility] from [facility] for further evaluation   , ESR 67 lactic acid 2.1, WBC 13.3  Blood culture 9/30 no growth.  MRI right foot Plantar lateral skin ulcer at the level of the fifth metatarsal head. Devitalized soft tissue thickening distal to the ulcer in the  plantar fifth toe. Nonspecific mild bone marrow edema in the fifth metatarsal head and proximal phalanx base may be reactive change alone but early osteomyelitis is not excluded. Probable postoperative changes in the soft tissues of the distal forefoot more medially.  US WALLACE - WALLACE on the left is within normal limits. WALLACE on the right would indicate moderate arterial insufficiency.     On IV vancomycin, IV Zosyn [9/30] to be continued.   Follow culture results.  Podiatry signed off [10/1], underwent bedside debridement.  Recommend vascular surgery intervention for possible amputation, INR down to 1.2 before surgical intervention.  Nonweightbearing right foot.  Vascular surgery following along.     Uncontrolled diabetes mellitus with hyperglycemia.  HgbA1C 9.0% on admission.   Home regimen includes metformin 500 mg BID.   Patient's blood pressures have been between 200-350 since admission.  Insulin Lantus 20 units at bedtime [10/1] to be continued.  Start insulin NovoLog 8 units 3 times a day with meals [10/2].  Continue  medium intensity insulin sliding scale.  Recommend discharging metformin at the time of discharge as patient having kidney injury.  Diabetic education provided, will need to see a nutritionist and closely follow-up as outpatient.    Persistent atrial fibrillation.  PTA Coumadin on hold, currently on IV heparin drip for surgery.  PTA was on digoxin on recent discharge summary from [facility], unclear why it was discontinued.  Telemetry monitoring.    #HFrEF  Echo 7/2018 with severely reduced EF, LVEF of 25%, RV dilatation with systolic dysfunction.   PTA Lasix 40 mg oral daily, metoprolol 100 mg oral daily.  Patient's Spironolactone was recently discontinued after he had acute on chronic kidney injury.  Continue PTA metoprolol 100 mg oral daily  PTA Lasix on hold since admission.  [Possible angiogram per vascular]  Strict input output monitoring, daily weights.  Recheck volume status/renal function a.m. and will consider restarting Lasix.  Telemetry monitoring.    #CAD/ S/p 3-vessel CABG in 1995 and PCI to mLAD 3/2018  Continue PTA atorvastatin 20 mg oral daily, Plavix 75 mg oral daily.    #PAD / hx of ileal artery stents.  Continue PTA Plavix 75 mg oral daily, atorvastatin 20 mg oral daily.    #2nd Degree AV Block.   Noted to have abnormal conduction on telemetry 9/22 at [facility].  EKG than -sinus rhythm with 2nd degree AV block, appears to have been present on previous EKG back in 7/2018. His digoxin was then held, after discussion with cardiology, resumed on discharge. His primary cardiologist is Dr. Valdes. Recommend close follow up with OP cardiology, next appointment 10/22.      CKD III  Baseline had been about 1.4 (since 5/2018)  Monitor renal function periodically.  Avoid nephrotoxic drugs.    Mild pseudohyponatremia in the setting of hyperglycemia  Presented with sodium of 126 with elevated blood sugars.  Currently sodium 128.  [Blood sugars between 250-300]  Data sodium levels a.m.      #HLD  Continue PTA  "atorvastatin 20 mg oral daily.    Hypertension.  Continue PTA metoprolol 100 mg oral daily.  PTA Lasix on hold.   Not on ACE inhibitor due to recent acute on chronic kidney injury.    #BPH   no urinary concerns during admission.   Tamsulosin was recently discharged due to recurrent falls.  Timed voiding.      #GERD  PTA ranitidine 150 mg twice daily continued.      Anxiety  continued on his home regimen of citalopram 10 mg oral daily.  Discontinued Alphatrex recently due to high fall risk.    # Physical deconditioning due to acute illness/chronic debility  Mechanical Falls.   Admitted from 9/21 - 9/24 at [facility] for acute on chronic kidney injury and mechanical fall/supratherapeutic INR.  He reported that his \"knees give out\".   CT head [care everywhere] negative 9/22.   Telemetry monitoring for arrhythmia.  PT and OT assessment at the time of discharge    Active Diet Order      Moderate Consistent CHO Diet    DVT Prophylaxis:   Code Status: Full Code  Disposition: Expected discharge in few days pending clinical improvement    Patient, interdisciplinary team involved in care and agrees with plan.  Total time -  35 min. More than 50% of time spent in direct patient care, care coordination, patient counseling, and formalizing plan of care.     Erin Chavis MD        Interval History:      Patient continues to complain of pain in his right foot.  Denies any chest pain or shortness of breath.  No nausea vomiting, tolerating oral diet.  No blood in the stools or blood in the urine.  Currently on IV heparin as Coumadin has been on hold.  Continues to have high blood sugars between 200-300.         Physical Exam:        Physical Exam   Temp:  [97.5  F (36.4  C)-99.2  F (37.3  C)] 97.5  F (36.4  C)  Pulse:  [63-85] 63  Resp:  [16-18] 18  BP: (102-110)/(49-52) 103/52  SpO2:  [98 %-99 %] 98 %    Intake/Output Summary (Last 24 hours) at 10/02/18 5085  Last data filed at 10/02/18 1100   Gross " per 24 hour   Intake              240 ml   Output              750 ml   Net             -510 ml     Admission Weight: 80.5 kg (177 lb 8 oz)  Current Weight: 80.5 kg (177 lb 8 oz)    PHYSICAL EXAM  GENERAL: Patient is in no distress. Alert and oriented.  HEART: irregular rate and rhythm. S1S2.  Systolic murmur right sternal area.  LUNGS: Bilateral decreased breath sounds, no wheezing no crackles.  ABDOMEN: Soft, no abdominal tenderness, bowel sounds heard   NEURO: Moving all extremities.  No focal weakness.  EXTREMITIES: trace pedal edema.  Feeble peripheral pulses right leg.  SKIN: right foot with ecchymosis and fluid over much of plantar surface of right foot, and ecchymosis of right 5th toe.  Left foot with toe amputation  PSYCHIATRY Cooperative       Medications:          atorvastatin  20 mg Oral Daily     cholecalciferol  1,000 Units Oral Daily     citalopram (celeXA) tablet 10 mg  10 mg Oral Daily     clopidogrel  75 mg Oral Daily     insulin aspart  8 Units Subcutaneous TID w/meals     insulin aspart  1-7 Units Subcutaneous TID AC     insulin aspart  1-5 Units Subcutaneous At Bedtime     insulin glargine  20 Units Subcutaneous At Bedtime     melatonin tablet 5 mg  5 mg Oral At Bedtime     metoprolol succinate  100 mg Oral Daily     miconazole   Topical TID     piperacillin-tazobactam  3.375 g Intravenous Q6H     vancomycin (VANCOCIN) IV  1,750 mg Intravenous Q24H     acetaminophen, glucose **OR** dextrose **OR** glucagon, melatonin, naloxone, ondansetron **OR** ondansetron, oxyCODONE IR, ranitidine, senna-docusate **OR** senna-docusate         Data:      All new lab and imaging data was reviewed.

## 2018-10-02 NOTE — PROGRESS NOTES
Hep gtt bolus of 4000u given.  Rate change increase to 13 mL/hr.  Next hep 10a level due at 1400 today.  See MAR for details.

## 2018-10-02 NOTE — PLAN OF CARE
Problem: Patient Care Overview  Goal: Plan of Care/Patient Progress Review  Outcome: No Change  A&Ox4, VSS on RA. Up with 1, GB. Mod carb diet. Lantus given late, 0400 . Heparin drip started on pt. Denies nausea, denies pain. Non-weight bearing on right foot. Voiding per urinal. On IV vanco and zosyn. New PIV started. Awaiting perfusion results to decide on R BKA. Vascular and podiatry following. Will continue to monitor.

## 2018-10-02 NOTE — PLAN OF CARE
Problem: Patient Care Overview  Goal: Plan of Care/Patient Progress Review  Outcome: No Change  AOx4, VSS on r/a.  Denies pain, nausea, h/a.  Heparin gtt increased to 13mL/hr and bolus of 4000 unit(s) given, see MAR.  Next Hep 10a scheduled for 1400.  Right foot redressed, elevated.  Up with 1 and walker, urinal for voids.  Physical activity encouraged, minimally up for meals.  Deconditioning education provided.  Pt verbalizes understanding.  , 184 today.  Scheduled insulin added to MAR for hyperglycemic trends.  Vascular recommending possible angiograph.  Surgery recommending R-BKA.

## 2018-10-03 LAB
ABO + RH BLD: NORMAL
ABO + RH BLD: NORMAL
ALBUMIN SERPL-MCNC: 2 G/DL (ref 3.4–5)
ALP SERPL-CCNC: 73 U/L (ref 40–150)
ALT SERPL W P-5'-P-CCNC: 14 U/L (ref 0–70)
ANION GAP SERPL CALCULATED.3IONS-SCNC: 10 MMOL/L (ref 3–14)
APTT PPP: 101 SEC (ref 22–37)
AST SERPL W P-5'-P-CCNC: 15 U/L (ref 0–45)
BILIRUB SERPL-MCNC: 0.6 MG/DL (ref 0.2–1.3)
BLD GP AB SCN SERPL QL: NORMAL
BLD PROD TYP BPU: NORMAL
BLD PROD TYP BPU: NORMAL
BLD UNIT ID BPU: 0
BLOOD BANK CMNT PATIENT-IMP: NORMAL
BLOOD PRODUCT CODE: NORMAL
BPU ID: NORMAL
BUN SERPL-MCNC: 30 MG/DL (ref 7–30)
CALCIUM SERPL-MCNC: 8 MG/DL (ref 8.5–10.1)
CHLORIDE SERPL-SCNC: 103 MMOL/L (ref 94–109)
CK SERPL-CCNC: 36 U/L (ref 30–300)
CO2 SERPL-SCNC: 22 MMOL/L (ref 20–32)
CREAT SERPL-MCNC: 1.28 MG/DL (ref 0.66–1.25)
CRP SERPL-MCNC: 132 MG/L (ref 0–8)
ERYTHROCYTE [DISTWIDTH] IN BLOOD BY AUTOMATED COUNT: 14.1 % (ref 10–15)
FOLATE SERPL-MCNC: 12.6 NG/ML
GFR SERPL CREATININE-BSD FRML MDRD: 55 ML/MIN/1.7M2
GLUCOSE BLDC GLUCOMTR-MCNC: 137 MG/DL (ref 70–99)
GLUCOSE BLDC GLUCOMTR-MCNC: 143 MG/DL (ref 70–99)
GLUCOSE BLDC GLUCOMTR-MCNC: 222 MG/DL (ref 70–99)
GLUCOSE BLDC GLUCOMTR-MCNC: 222 MG/DL (ref 70–99)
GLUCOSE BLDC GLUCOMTR-MCNC: 239 MG/DL (ref 70–99)
GLUCOSE SERPL-MCNC: 135 MG/DL (ref 70–99)
HCT VFR BLD AUTO: 19.6 % (ref 40–53)
HEMOCCULT STL QL IA: NEGATIVE
HGB BLD-MCNC: 6.9 G/DL (ref 13.3–17.7)
HGB BLD-MCNC: 7.5 G/DL (ref 13.3–17.7)
LACTATE BLD-SCNC: 0.7 MMOL/L (ref 0.7–2)
LMWH PPP CHRO-ACNC: <0.1 IU/ML
MCH RBC QN AUTO: 31.5 PG (ref 26.5–33)
MCHC RBC AUTO-ENTMCNC: 35.2 G/DL (ref 31.5–36.5)
MCV RBC AUTO: 90 FL (ref 78–100)
NT-PROBNP SERPL-MCNC: ABNORMAL PG/ML (ref 0–1800)
NUM BPU REQUESTED: 2
PLATELET # BLD AUTO: 107 10E9/L (ref 150–450)
POTASSIUM SERPL-SCNC: 3.2 MMOL/L (ref 3.4–5.3)
PROT SERPL-MCNC: 5.5 G/DL (ref 6.8–8.8)
RBC # BLD AUTO: 2.19 10E12/L (ref 4.4–5.9)
SODIUM SERPL-SCNC: 135 MMOL/L (ref 133–144)
SPECIMEN EXP DATE BLD: NORMAL
TRANSFUSION STATUS PATIENT QL: NORMAL
TRANSFUSION STATUS PATIENT QL: NORMAL
TSH SERPL DL<=0.005 MIU/L-ACNC: 1.11 MU/L (ref 0.4–4)
VANCOMYCIN SERPL-MCNC: 15.6 MG/L
VIT B12 SERPL-MCNC: 442 PG/ML (ref 193–986)
WBC # BLD AUTO: 6.6 10E9/L (ref 4–11)

## 2018-10-03 PROCEDURE — 12000000 ZZH R&B MED SURG/OB

## 2018-10-03 PROCEDURE — 99233 SBSQ HOSP IP/OBS HIGH 50: CPT | Performed by: HOSPITALIST

## 2018-10-03 PROCEDURE — 86900 BLOOD TYPING SEROLOGIC ABO: CPT | Performed by: INTERNAL MEDICINE

## 2018-10-03 PROCEDURE — A9270 NON-COVERED ITEM OR SERVICE: HCPCS | Mod: GY | Performed by: HOSPITALIST

## 2018-10-03 PROCEDURE — 25000128 H RX IP 250 OP 636: Performed by: INTERNAL MEDICINE

## 2018-10-03 PROCEDURE — 25000132 ZZH RX MED GY IP 250 OP 250 PS 637: Mod: GY | Performed by: HOSPITALIST

## 2018-10-03 PROCEDURE — P9016 RBC LEUKOCYTES REDUCED: HCPCS | Performed by: INTERNAL MEDICINE

## 2018-10-03 PROCEDURE — 80053 COMPREHEN METABOLIC PANEL: CPT | Performed by: HOSPITALIST

## 2018-10-03 PROCEDURE — 85027 COMPLETE CBC AUTOMATED: CPT | Performed by: HOSPITALIST

## 2018-10-03 PROCEDURE — 00000146 ZZHCL STATISTIC GLUCOSE BY METER IP

## 2018-10-03 PROCEDURE — 82550 ASSAY OF CK (CPK): CPT | Performed by: HOSPITALIST

## 2018-10-03 PROCEDURE — 84443 ASSAY THYROID STIM HORMONE: CPT | Performed by: HOSPITALIST

## 2018-10-03 PROCEDURE — 85520 HEPARIN ASSAY: CPT | Performed by: HOSPITALIST

## 2018-10-03 PROCEDURE — 82274 ASSAY TEST FOR BLOOD FECAL: CPT | Performed by: HOSPITALIST

## 2018-10-03 PROCEDURE — 82746 ASSAY OF FOLIC ACID SERUM: CPT | Performed by: HOSPITALIST

## 2018-10-03 PROCEDURE — 85018 HEMOGLOBIN: CPT | Performed by: HOSPITALIST

## 2018-10-03 PROCEDURE — 25000128 H RX IP 250 OP 636

## 2018-10-03 PROCEDURE — 83880 ASSAY OF NATRIURETIC PEPTIDE: CPT | Performed by: HOSPITALIST

## 2018-10-03 PROCEDURE — 86140 C-REACTIVE PROTEIN: CPT | Performed by: HOSPITALIST

## 2018-10-03 PROCEDURE — 80202 ASSAY OF VANCOMYCIN: CPT | Performed by: INTERNAL MEDICINE

## 2018-10-03 PROCEDURE — 86901 BLOOD TYPING SEROLOGIC RH(D): CPT | Performed by: INTERNAL MEDICINE

## 2018-10-03 PROCEDURE — A9270 NON-COVERED ITEM OR SERVICE: HCPCS | Mod: GY | Performed by: INTERNAL MEDICINE

## 2018-10-03 PROCEDURE — 85730 THROMBOPLASTIN TIME PARTIAL: CPT | Performed by: HOSPITALIST

## 2018-10-03 PROCEDURE — 82607 VITAMIN B-12: CPT | Performed by: HOSPITALIST

## 2018-10-03 PROCEDURE — 86922 COMPATIBILITY TEST ANTIGLOB: CPT | Performed by: INTERNAL MEDICINE

## 2018-10-03 PROCEDURE — 25000132 ZZH RX MED GY IP 250 OP 250 PS 637: Mod: GY | Performed by: INTERNAL MEDICINE

## 2018-10-03 PROCEDURE — 36415 COLL VENOUS BLD VENIPUNCTURE: CPT | Performed by: INTERNAL MEDICINE

## 2018-10-03 PROCEDURE — 82565 ASSAY OF CREATININE: CPT | Performed by: HOSPITALIST

## 2018-10-03 PROCEDURE — 25000128 H RX IP 250 OP 636: Performed by: HOSPITALIST

## 2018-10-03 PROCEDURE — 86850 RBC ANTIBODY SCREEN: CPT | Performed by: INTERNAL MEDICINE

## 2018-10-03 PROCEDURE — 25000131 ZZH RX MED GY IP 250 OP 636 PS 637: Mod: GY | Performed by: HOSPITALIST

## 2018-10-03 PROCEDURE — 36415 COLL VENOUS BLD VENIPUNCTURE: CPT | Performed by: HOSPITALIST

## 2018-10-03 PROCEDURE — 83605 ASSAY OF LACTIC ACID: CPT | Performed by: HOSPITALIST

## 2018-10-03 RX ORDER — POTASSIUM CHLORIDE 29.8 MG/ML
20 INJECTION INTRAVENOUS
Status: DISCONTINUED | OUTPATIENT
Start: 2018-10-03 | End: 2018-10-09

## 2018-10-03 RX ORDER — POTASSIUM CL/LIDO/0.9 % NACL 10MEQ/0.1L
10 INTRAVENOUS SOLUTION, PIGGYBACK (ML) INTRAVENOUS
Status: DISCONTINUED | OUTPATIENT
Start: 2018-10-03 | End: 2018-10-09

## 2018-10-03 RX ORDER — POTASSIUM CHLORIDE 7.45 MG/ML
10 INJECTION INTRAVENOUS
Status: DISCONTINUED | OUTPATIENT
Start: 2018-10-03 | End: 2018-10-09

## 2018-10-03 RX ORDER — FUROSEMIDE 10 MG/ML
20 INJECTION INTRAMUSCULAR; INTRAVENOUS ONCE
Status: COMPLETED | OUTPATIENT
Start: 2018-10-03 | End: 2018-10-04

## 2018-10-03 RX ORDER — BISACODYL 10 MG
10 SUPPOSITORY, RECTAL RECTAL DAILY PRN
Status: DISCONTINUED | OUTPATIENT
Start: 2018-10-03 | End: 2018-10-16 | Stop reason: HOSPADM

## 2018-10-03 RX ORDER — CEFAZOLIN SODIUM 1 G/3ML
1 INJECTION, POWDER, FOR SOLUTION INTRAMUSCULAR; INTRAVENOUS SEE ADMIN INSTRUCTIONS
Status: CANCELLED | OUTPATIENT
Start: 2018-10-03

## 2018-10-03 RX ORDER — CEFAZOLIN SODIUM 2 G/100ML
2 INJECTION, SOLUTION INTRAVENOUS
Status: CANCELLED | OUTPATIENT
Start: 2018-10-03

## 2018-10-03 RX ORDER — POTASSIUM CHLORIDE 1.5 G/1.58G
20-40 POWDER, FOR SOLUTION ORAL
Status: DISCONTINUED | OUTPATIENT
Start: 2018-10-03 | End: 2018-10-09

## 2018-10-03 RX ORDER — FUROSEMIDE 10 MG/ML
20 INJECTION INTRAMUSCULAR; INTRAVENOUS ONCE
Status: COMPLETED | OUTPATIENT
Start: 2018-10-03 | End: 2018-10-03

## 2018-10-03 RX ORDER — POTASSIUM CHLORIDE 1500 MG/1
20-40 TABLET, EXTENDED RELEASE ORAL
Status: DISCONTINUED | OUTPATIENT
Start: 2018-10-03 | End: 2018-10-09

## 2018-10-03 RX ADMIN — INSULIN ASPART 2 UNITS: 100 INJECTION, SOLUTION INTRAVENOUS; SUBCUTANEOUS at 13:05

## 2018-10-03 RX ADMIN — METOPROLOL SUCCINATE 100 MG: 100 TABLET, EXTENDED RELEASE ORAL at 20:33

## 2018-10-03 RX ADMIN — ACETAMINOPHEN 650 MG: 325 TABLET, FILM COATED ORAL at 00:23

## 2018-10-03 RX ADMIN — POTASSIUM CHLORIDE 40 MEQ: 1500 TABLET, EXTENDED RELEASE ORAL at 21:41

## 2018-10-03 RX ADMIN — INSULIN ASPART 1 UNITS: 100 INJECTION, SOLUTION INTRAVENOUS; SUBCUTANEOUS at 09:27

## 2018-10-03 RX ADMIN — BISACODYL 10 MG: 10 SUPPOSITORY RECTAL at 13:33

## 2018-10-03 RX ADMIN — ATORVASTATIN CALCIUM 20 MG: 20 TABLET, FILM COATED ORAL at 09:24

## 2018-10-03 RX ADMIN — HEPARIN SODIUM 1750 UNITS/HR: 10000 INJECTION, SOLUTION INTRAVENOUS at 03:56

## 2018-10-03 RX ADMIN — PIPERACILLIN SODIUM,TAZOBACTAM SODIUM 3.38 G: 3; .375 INJECTION, POWDER, FOR SOLUTION INTRAVENOUS at 06:06

## 2018-10-03 RX ADMIN — VITAMIN D, TAB 1000IU (100/BT) 1000 UNITS: 25 TAB at 09:24

## 2018-10-03 RX ADMIN — PIPERACILLIN SODIUM,TAZOBACTAM SODIUM 3.38 G: 3; .375 INJECTION, POWDER, FOR SOLUTION INTRAVENOUS at 20:33

## 2018-10-03 RX ADMIN — MICONAZOLE NITRATE: 2 POWDER TOPICAL at 11:32

## 2018-10-03 RX ADMIN — VANCOMYCIN HYDROCHLORIDE 1750 MG: 5 INJECTION, POWDER, LYOPHILIZED, FOR SOLUTION INTRAVENOUS at 00:25

## 2018-10-03 RX ADMIN — FUROSEMIDE 20 MG: 10 INJECTION, SOLUTION INTRAVENOUS at 16:46

## 2018-10-03 RX ADMIN — SENNOSIDES AND DOCUSATE SODIUM 1 TABLET: 8.6; 5 TABLET ORAL at 09:30

## 2018-10-03 RX ADMIN — INSULIN GLARGINE 20 UNITS: 100 INJECTION, SOLUTION SUBCUTANEOUS at 22:35

## 2018-10-03 RX ADMIN — CLOPIDOGREL 75 MG: 75 TABLET, FILM COATED ORAL at 11:31

## 2018-10-03 RX ADMIN — CITALOPRAM HYDROBROMIDE 10 MG: 10 TABLET ORAL at 09:24

## 2018-10-03 RX ADMIN — RANITIDINE 150 MG: 150 TABLET ORAL at 09:24

## 2018-10-03 RX ADMIN — INSULIN ASPART 1 UNITS: 100 INJECTION, SOLUTION INTRAVENOUS; SUBCUTANEOUS at 22:35

## 2018-10-03 RX ADMIN — PIPERACILLIN SODIUM,TAZOBACTAM SODIUM 3.38 G: 3; .375 INJECTION, POWDER, FOR SOLUTION INTRAVENOUS at 00:00

## 2018-10-03 RX ADMIN — MELATONIN 5 MG TABLET 5 MG: at 21:41

## 2018-10-03 RX ADMIN — PIPERACILLIN SODIUM,TAZOBACTAM SODIUM 3.38 G: 3; .375 INJECTION, POWDER, FOR SOLUTION INTRAVENOUS at 14:37

## 2018-10-03 RX ADMIN — INSULIN ASPART 2 UNITS: 100 INJECTION, SOLUTION INTRAVENOUS; SUBCUTANEOUS at 17:51

## 2018-10-03 ASSESSMENT — ACTIVITIES OF DAILY LIVING (ADL)
ADLS_ACUITY_SCORE: 18
ADLS_ACUITY_SCORE: 17

## 2018-10-03 NOTE — PLAN OF CARE
Problem: Patient Care Overview  Goal: Plan of Care/Patient Progress Review  Outcome: Improving  A/Ox4. VSS. RA. Assist x1. Uses urinal. . CHO diet; NPO @ 0000. Scheduled surgery for BKA. Heparin has been stopped due to Hgb drop. Hgb 7.5 rt foot wrapped in dry gauze; CDI. mepilex on coccyx; CDI. mepilex to left shin; CDI. Tele: SR with BBB and 1st degree AV block. K 3.2. Discharge in few days pending clinical improvement.

## 2018-10-03 NOTE — PLAN OF CARE
Problem: Patient Care Overview  Goal: Plan of Care/Patient Progress Review  Outcome: No Change  Hgb 6.9 this morning; on recheck was 7.5.  Tolerated 1 unit packed cells.  Up to commode and chair with assist of 1/GB/walker.  Denies pain.  BG's 143, 238, novolog coverage; appetite good.  Will restart abx after blood transfusion soon.  Denies pain.  Dressing changed to R foot.  Suppository given with some results, sending specimen to lab for occult blood.  Plan is for surgery tomorrow.  Heparin gtt stopped due to anemia; Plavix continues.

## 2018-10-03 NOTE — PLAN OF CARE
Patient : Shahrzad Bishop Age: 25 year old Sex: female   MRN: [id number] Encounter Date: 6/24/2018      History     Chief Complaint   Patient presents with   • Finger Injury     The patient presents, with her , with complaints of right middle finger pain and swelling. The patient states one week ago she cut her finger with a knife, while filleting fish, and has now developed pain and swelling. She has not taken any medications for her symptoms. She states she is having a hard time sleeping secondary to the pain. She rates the pain at a 9 out of 10. She denies fevers.            No Known Allergies    Discharge Medication List as of 6/24/2018  6:24 PM      CONTINUE these medications which have NOT CHANGED    Details   acetaminophen (TYLENOL) 500 MG tablet Take 1 tablet by mouth every 6 hours as needed for Pain.Eprescribe, Disp-30 tablet, R-1      !! ibuprofen (MOTRIN) 400 MG tablet Take 1 tablet by mouth 3 times daily as needed for Pain.Normal, Disp-30 tablet, R-0      ondansetron (ZOFRAN) 4 MG tablet Take 1 tablet by mouth every 8 hours as needed for Nausea.Eprescribe, Disp-30 tablet, R-1      Calcium Carb-Cholecalciferol (CALCIUM + D3 PO) Take by mouth daily.Historical Med      vitamin D, Cholecalciferol, 1000 units capsule Take 2 capsules by mouth daily.Eprescribe, Disp-60 capsule, R-2      cetirizine (ZYRTEC ALLERGY) 10 MG tablet Take 1 tablet by mouth daily.Eprescribe, Disp-90 tablet, R-0       !! - Potential duplicate medications found. Please discuss with provider.          Discharge Medication List as of 6/24/2018  6:24 PM      START taking these medications    Details   sulfamethoxazole-trimethoprim (BACTRIM DS) 800-160 MG per tablet Take 1 tablet by mouth 2 times daily for 10 days.Eprescribe, Disp-20 tablet, R-0      cephALEXin (KEFLEX) 500 MG capsule Take 1 capsule by mouth 4 times daily for 10 days.Eprescribe, Disp-40 capsule, R-0      HYDROcodone-acetaminophen (NORCO) 5-325 MG per tablet Take 1-2 tablets  Problem: Patient Care Overview  Goal: Plan of Care/Patient Progress Review  Outcome: No Change  Patient A/Ox4. Non-weight baring on right foot. Up with AX1 GB and walker. Good urine output. Denies pain or SOB. VSS except low grade fever. Tylenol given with improvement. Heparin drip changed to 17.5ml.hr. Hep 10 A scheduled for 0600. Mod carb diet tolerating well. . Tele sinus rhythm with BBB. Right lateral foot dressing in place CDI. WOC, vascular surgery following. Plan for surgery Thursday( amputation of right foot) then again on Monday (R. BKA) per patient and surgery note. Nursing continue to monitor.       by mouth every 4 hours as needed for Pain.Eprescribe, Disp-15 tablet, R-0      !! ibuprofen (MOTRIN) 800 MG tablet Take 1 tablet by mouth 3 times daily as needed for Pain.Eprescribe, Disp-20 tablet, R-0       !! - Potential duplicate medications found. Please discuss with provider.          Past Medical History:   Diagnosis Date   • Anemia        Past Surgical History:   Procedure Laterality Date   •  SECTION, LOW TRANSVERSE  2017       No family history on file.    Social History   Substance Use Topics   • Smoking status: Never Smoker   • Smokeless tobacco: Never Used   • Alcohol use No       Review of Systems   Constitutional: Negative for chills and fever.   Musculoskeletal: Positive for arthralgias.   Skin: Positive for rash and wound.   Neurological: Negative for numbness.       Physical Exam     ED Triage Vitals [18 1995]   ED Triage Vitals Group      Temp       Pulse 95      Resp 14      /61      SpO2 97 %      EtCO2 mmHg       Height 5' (1.524 m)      Weight 140 lb (63.5 kg)      Weight Scale Used ED Stated       Physical Exam   Constitutional: She is oriented to person, place, and time. She appears well-developed and well-nourished.   HENT:   Head: Normocephalic and atraumatic.   Eyes: Conjunctivae, EOM and lids are normal.   Neck: Normal range of motion.   Cardiovascular:   Pulses:       Radial pulses are 2+ on the right side.   Pulmonary/Chest: Effort normal. No accessory muscle usage. No tachypnea. No respiratory distress.   Musculoskeletal:        Hands:  Neurological: She is alert and oriented to person, place, and time.   Skin: Skin is warm and dry. No rash noted. She is not diaphoretic. There is erythema. No pallor.   Psychiatric: She has a normal mood and affect. Her behavior is normal.   Nursing note and vitals reviewed.      ED Course     Procedures    Lab Results     No results found for this visit on 18.    Radiology Results     Imaging Results    None          ED Medication Orders     Start Ordered     Status Ordering Provider    06/24/18 1831 06/24/18 1830  ibuprofen (MOTRIN) tablet 800 mg  ONCE      Last MAR action:  Given SAVANNA MENDES    06/24/18 1830 06/24/18 1830  HYDROcodone-acetaminophen (NORCO) 5-325 MG per tablet 1 tablet  EVERY 4 HOURS PRN      Last MAR action:  Given SAVANNA MENDES          MDM  Number of Diagnoses or Management Options  Cellulitis of finger of right hand:   The patient presents with complaints of pain and swelling to the right middle finger. The patient cut her finger last week, while filleting fish. Upon exam, the patient has swelling and erythema to much of the right middle finger. Passive range of motion is normal as is sensation. This is likely a cellulitis. She was given Vicodin and ibuprofen while in the ER. She was given prescriptions for Vicodin, ibuprofen, Keflex and Bactrim. She was advised to follow-up with her primary care provider in the next 2-3 days. An  was used throughout the entire ER visit. She was stable and well-appearing throughout entire ER visit and upon discharge. She was advised to return to the ER if new or worsening symptoms develop. She verbalizes understanding and agrees with the plan.    Clinical Impression     ED Diagnosis   1. Cellulitis of finger of right hand         Disposition        Discharge 6/24/2018  6:22 PM  Shahrzad Bishop discharge to home/self care.                    MARIPOSA Momin  06/24/18 4018

## 2018-10-03 NOTE — PLAN OF CARE
Problem: Patient Care Overview  Goal: Plan of Care/Patient Progress Review  Outcome: No Change  Pt A/O#4, cooperative, up w1/Walker to BR, VSS on RA, denies pain, fair appetite, good fluid intake, uses urinal, Bg 185,/207-covered with Novolog and Lantus at hs, Heparin running at 1600u/h after bolus, re-check Hep level at 2200, tele-afib w/CVR/BBB to SR w/BBB, Rt foot dressing changed by Dr Vick-D/I, continues on Zosyn/Vanco, possible surgery on Th, Podiatry/Vascular following, will monitor.

## 2018-10-03 NOTE — PROVIDER NOTIFICATION
Text paged to Dr. Chavis.  Held Plavix until clarified.  Question need to discontinue it.  Also patient had yesterday, is this long enough for pre-op?  Addendum:  Dr. Chavis asked that Dr. Silva make the decision.  Call placed to Dr. Silva.  Addendum:  Dr. Silva returned call.  Continue Plavix.

## 2018-10-03 NOTE — PROGRESS NOTES
[facility]    Vascular Surgery Progress Note    Assessment & Plan     78 y/o male with non-healing extensive RLE foot wounds and PAD.   Unexpected drop in Hbg this morning to 6.9. Patient has no obvious source of bleeding. Discussed with Hospitalist. Patient will receive one unit of PRBC, heparin gtt held, repeat CBC later today.   Would like Hbg >8.0 prior to surgery  Plan to proceed with surgery tomorrow afternoon for RLE open BKA amputation   Will follow up anemia work-up and repeat Hbg     Principal Problem:    Probable Osteomyelitis Right Foot  Active Problems:    DM type 2 w neuropathy -- Hgb A1C 9.0 on 9/22/18    HTN (hypertension)    CAD -- S/P CABG    Osteomyelitis left 2nd Toe s/p amputaion 1/10/14    CRF (chronic renal failure), stage 3 (moderate) (H)    Chronic systolic heart failure (H)    Atrial fibrillation -- on Warfarin    Hyponatremia      Min Lela Silva MD    Interval History   No acute events overnight. Patient resting comfortably this morning. No complaints     Physical Exam   Temp: 97.3  F (36.3  C) Temp src: Oral BP: 114/54 Pulse: 52   Resp: 18 SpO2: 97 % O2 Device: None (Room air)    Vitals:    09/30/18 2140   Weight: 80.5 kg (177 lb 8 oz)     Vital Signs with Ranges  Temp:  [97.3  F (36.3  C)-100  F (37.8  C)] 97.3  F (36.3  C)  Pulse:  [52-87] 52  Resp:  [18] 18  BP: (114-118)/(41-55) 114/54  SpO2:  [97 %-99 %] 97 %  I/O last 3 completed shifts:  In: 320 [P.O.:320]  Out: 1625 [Urine:1625]    PE:   NAD, awake and alert  Chest: S1 S2 present, RRR, no wheezing   Abd: soft, NT, ND, no rebound or guarding  Ex: Palpable bilateral femoral pulse. L>R. Non-palpable distal DP/PT pulses on the right. Doppler distal signals. Small 1cm ulcer to the lateral right 5th toe. 1-2cm open wound at middle plantar surface of the right foot with necrotic tissue and malodor drainage. No wounds on the left foot.     Medications        atorvastatin  20 mg Oral Daily     cholecalciferol  1,000 Units Oral  Daily     citalopram (celeXA) tablet 10 mg  10 mg Oral Daily     clopidogrel  75 mg Oral Daily     insulin aspart  8 Units Subcutaneous TID w/meals     insulin aspart  1-7 Units Subcutaneous TID AC     insulin aspart  1-5 Units Subcutaneous At Bedtime     insulin glargine  20 Units Subcutaneous At Bedtime     melatonin tablet 5 mg  5 mg Oral At Bedtime     metoprolol succinate  100 mg Oral Daily     miconazole   Topical TID     piperacillin-tazobactam  3.375 g Intravenous Q6H     ranitidine  150 mg Oral Daily     vancomycin (VANCOCIN) IV  1,750 mg Intravenous Q24H       Data   CBC RESULTS:   Recent Labs   Lab Test  10/03/18   0905  10/03/18   0636   WBC   --   6.6   RBC   --   2.19*   HGB  7.5*  6.9*   HCT   --   19.6*   MCV   --   90   MCH   --   31.5   MCHC   --   35.2   RDW   --   14.1   PLT   --   107*

## 2018-10-03 NOTE — PROVIDER NOTIFICATION
Critical lab of Hgb 6.9 text paged to Dr. Chavis.  (Patient is on a Hep gtt; will hold off on new bolus order from pharmacy until MD addresses).

## 2018-10-03 NOTE — PROGRESS NOTES
[facility]  Hospitalist Progress Note   10/03/2018          Assessment and Plan:       Tad Manning is a 77 year old male history of HTN, HFrEF at 20%, HLD, CAD s/p 3-vessel CABG (1995) and PCI to mLAD (3/2018), PAD s/p ileal artery stents, persistent atrial fibrillation, DM type II, CKD stage III, CLL (in remission), BPH, GERD, and anxiety admitted on 9/30/2018 for right foot pain.    Diabetic right foot infection   s/p bedside debridement (10/1)  Osteomyelitis right foot.  Peripheral vascular disease.s/p left LE bypass in 2014  Patient transferred from [facility] to [facility] for further evaluation of non-healing right foot ulcer.   , ESR 67 lactic acid 2.1, WBC 13.3  Blood culture 9/30 no growth.  MRI right foot Plantar lateral skin ulcer at the level of the fifth metatarsal head. Devitalized soft tissue thickening distal to the ulcer in the  plantar fifth toe. Nonspecific mild bone marrow edema in the fifth metatarsal head and proximal phalanx base may be reactive change alone but early osteomyelitis is not excluded. Probable postoperative changes in the soft tissues of the distal forefoot more medially.  US WALLACE - WALLACE on the left is within normal limits. WALLACE on the right would indicate moderate arterial insufficiency.     On IV vancomycin, IV Zosyn [9/30] to be continued.   Follow culture results.  Will consult infectious disease for antibiotic management.  Podiatry signed off [10/1], underwent bedside debridement.  Recommend nonweightbearing right foot.  Vascular surgery following along, recommend right below-knee amputation Thursday afternoon.  Appreciate comanagement.    Acute on chronic anemia.  Unclear etiology/possible competent of dilutional  Patient has a baseline hemoglobin between 9-10.   Hemoglobin 10.2 on 9/23, dropped to 8.6 on 10/1 and further drop of 6.9 today.  Patient does not have any blood loss at this time.  Has not been on IV fluids.  Repeat  hemoglobin, will check TSH, vitamin B12, folic acid.  We will check proBNP, does not appear volume overloaded.  UA for evaluation of hematuria.  Fecal occult blood test requested.  Hold heparin drip given ongoing anemia.  We will transfuse 1 unit PRBC given patient's history of cardiac illness status post stenting in March.  Have discussed with vascular surgery fellow  - pending workup for anemia at this time.     Uncontrolled diabetes mellitus with hyperglycemia.  HgbA1C 9.0% on admission.   Home regimen includes metformin 500 mg BID.   At the time of admissions patient's blood sugars have been fluctuating between 200-400.  Insulin Lantus 20 units at bedtime [10/1] to be continued.  Insulin NovoLog 8 units 3 times a day with meals [10/2] to be continued.  Continue medium intensity insulin sliding scale.  Recommend discontinuing metformin at the time of discharge as patient having kidney injury.  Diabetic education provided, will need to see a nutritionist and closely follow-up as outpatient.    Persistent atrial fibrillation.  PTA Coumadin on hold  Was on IV heparin drip 9/30 -this morning heparin drip discontinued as hemoglobin dropped.  Did discuss concern for stroke while off anticoagulation with patient.    PTA was on digoxin as per recent discharge summary from [facility], unclear why it was discontinued.  Continue PTA metoprolol 100 mg oral daily  Telemetry monitoring.    Chronic systolic congestive heart failure.  Patient does not have any shortness of breath,  Echo 7/2018 with severely reduced EF, LVEF of 25%, RV dilatation with systolic dysfunction.  Continue PTA metoprolol 100 mg oral daily  PTA 40 mg oral Lasix on hold since admission.Recheck volume status/renal function a.m. and consider restarting Lasix. Receiving blood transfusion today, will give 20 mg IV Lasix x1  Strict input output monitoring, daily weights.    CAD/ S/p 3-vessel CABG in 1995 and PCI to mLAD 3/2018  Continue PTA atorvastatin 20 mg  "oral daily, Plavix 75 mg oral daily.  If continues to have decreased hemoglobin we will consider holding Plavix.    PAD / hx of ileal artery stents.  Continue PTA Plavix 75 mg oral daily, atorvastatin 20 mg oral daily.    2nd Degree AV Block.   Noted to have abnormal conduction on telemetry 9/22 at [facility].  EKG then showed -sinus rhythm with 2nd degree AV block, appears to have been present on previous EKG back in 7/2018. His digoxin was then held, after discussion with cardiology, resumed on discharge. His primary cardiologist is Dr. Valdes. Recommend close follow up with OP cardiology, next appointment 10/22.     CKD III  Baseline had been about 1.4 (since 5/2018)  Monitor renal function periodically.  Avoid nephrotoxic drugs.    Mild pseudohyponatremia in the setting of hyperglycemia  Presented with sodium of 126 with elevated blood sugars.  Currently sodium 135     HLD  Continue PTA atorvastatin 20 mg oral daily.    Hypertension.  Continue PTA metoprolol 100 mg oral daily.  PTA Lasix on hold as above   Not on ACE inhibitor due to recent acute on chronic kidney injury.    BPH   Tamsulosin was recently discharged due to recurrent falls.  Timed voiding.      GERD  PTA ranitidine 150 mg twice daily continued.      Anxiety  Continued on his home regimen of citalopram 10 mg oral daily.  Discontinued Alphatrex recently due to high fall risk.    Physical deconditioning due to acute illness/chronic debility  Mechanical Falls.   Admitted from 9/21 - 9/24 at [facility] for acute on chronic kidney injury and mechanical fall/supratherapeutic INR.  He reported that his \"knees give out\".   CT head [care everywhere] negative 9/22.   Telemetry monitoring for arrhythmia.  PT and OT assessment at the time of discharge    Active Diet Order      Moderate Consistent CHO Diet    DVT Prophylaxis:   Code Status: Full Code  Disposition: Expected discharge in few days pending clinical improvement    Patient, " interdisciplinary team involved in care and agrees with plan.  Total time -  35 min. More than 50% of time spent in direct patient care, care coordination, patient counseling, and formalizing plan of care.     Erin Chavis MD        Interval History:      Patient continues to complain of pain in his right foot.  Denies any chest pain or shortness of breath.  No nausea vomiting, tolerating oral diet.  Drop in hemoglobin to 6.9 this morning.  No blood in the stools or blood in the urine.  No coughing out blood  Continues to have high blood sugars between 100-200.         Physical Exam:        Physical Exam   Temp:  [97.3  F (36.3  C)-100  F (37.8  C)] 97.3  F (36.3  C)  Pulse:  [52-87] 52  Resp:  [18] 18  BP: (114-118)/(41-55) 114/54  SpO2:  [97 %-99 %] 97 %    Intake/Output Summary (Last 24 hours) at 10/02/18 1529  Last data filed at 10/02/18 1100   Gross per 24 hour   Intake              240 ml   Output              750 ml   Net             -510 ml     Admission Weight: 80.5 kg (177 lb 8 oz)  Current Weight: 80.5 kg (177 lb 8 oz)    PHYSICAL EXAM  GENERAL: Patient is in no distress. Alert and oriented.  HEART: irregular rate and rhythm. S1S2.  Systolic murmur right sternal area.  LUNGS: Bilateral decreased breath sounds, no wheezing no crackles.  ABDOMEN: Soft, no abdominal tenderness, bowel sounds heard   NEURO: Moving all extremities.  No focal weakness.  EXTREMITIES: trace pedal edema.  Feeble peripheral pulses right leg.  SKIN: right foot dressing intact   Left foot with toe amputation       Medications:          atorvastatin  20 mg Oral Daily     cholecalciferol  1,000 Units Oral Daily     citalopram (celeXA) tablet 10 mg  10 mg Oral Daily     clopidogrel  75 mg Oral Daily     insulin aspart  8 Units Subcutaneous TID w/meals     insulin aspart  1-7 Units Subcutaneous TID AC     insulin aspart  1-5 Units Subcutaneous At Bedtime     insulin glargine  20 Units Subcutaneous At Bedtime     melatonin tablet 5 mg   5 mg Oral At Bedtime     metoprolol succinate  100 mg Oral Daily     miconazole   Topical TID     piperacillin-tazobactam  3.375 g Intravenous Q6H     ranitidine  150 mg Oral Daily     vancomycin (VANCOCIN) IV  1,750 mg Intravenous Q24H     acetaminophen, glucose **OR** dextrose **OR** glucagon, naloxone, ondansetron **OR** ondansetron, oxyCODONE IR, senna-docusate **OR** senna-docusate         Data:      All new lab and imaging data was reviewed.

## 2018-10-04 ENCOUNTER — APPOINTMENT (OUTPATIENT)
Dept: GENERAL RADIOLOGY | Facility: CLINIC | Age: 78
DRG: 854 | End: 2018-10-04
Attending: INTERNAL MEDICINE
Payer: MEDICARE

## 2018-10-04 ENCOUNTER — ANESTHESIA EVENT (OUTPATIENT)
Dept: SURGERY | Facility: CLINIC | Age: 78
DRG: 854 | End: 2018-10-04
Payer: MEDICARE

## 2018-10-04 ENCOUNTER — ANESTHESIA (OUTPATIENT)
Dept: SURGERY | Facility: CLINIC | Age: 78
DRG: 854 | End: 2018-10-04
Payer: MEDICARE

## 2018-10-04 LAB
ANION GAP SERPL CALCULATED.3IONS-SCNC: 10 MMOL/L (ref 3–14)
APTT PPP: 33 SEC (ref 22–37)
BLD PROD TYP BPU: NORMAL
BLD UNIT ID BPU: 0
BLOOD PRODUCT CODE: NORMAL
BPU ID: NORMAL
BUN SERPL-MCNC: 17 MG/DL (ref 7–30)
CALCIUM SERPL-MCNC: 7.9 MG/DL (ref 8.5–10.1)
CHLORIDE SERPL-SCNC: 104 MMOL/L (ref 94–109)
CO2 SERPL-SCNC: 22 MMOL/L (ref 20–32)
CREAT SERPL-MCNC: 1.14 MG/DL (ref 0.66–1.25)
ERYTHROCYTE [DISTWIDTH] IN BLOOD BY AUTOMATED COUNT: 15.7 % (ref 10–15)
GFR SERPL CREATININE-BSD FRML MDRD: 62 ML/MIN/1.7M2
GLUCOSE BLDC GLUCOMTR-MCNC: 172 MG/DL (ref 70–99)
GLUCOSE BLDC GLUCOMTR-MCNC: 181 MG/DL (ref 70–99)
GLUCOSE BLDC GLUCOMTR-MCNC: 194 MG/DL (ref 70–99)
GLUCOSE BLDC GLUCOMTR-MCNC: 206 MG/DL (ref 70–99)
GLUCOSE BLDC GLUCOMTR-MCNC: 211 MG/DL (ref 70–99)
GLUCOSE SERPL-MCNC: 159 MG/DL (ref 70–99)
HCT VFR BLD AUTO: 26.8 % (ref 40–53)
HGB BLD-MCNC: 9.3 G/DL (ref 13.3–17.7)
INR PPP: 1.37 (ref 0.86–1.14)
MCH RBC QN AUTO: 30.1 PG (ref 26.5–33)
MCHC RBC AUTO-ENTMCNC: 34.7 G/DL (ref 31.5–36.5)
MCV RBC AUTO: 87 FL (ref 78–100)
NT-PROBNP SERPL-MCNC: ABNORMAL PG/ML (ref 0–1800)
PLATELET # BLD AUTO: 103 10E9/L (ref 150–450)
POTASSIUM SERPL-SCNC: 3.6 MMOL/L (ref 3.4–5.3)
RBC # BLD AUTO: 3.09 10E12/L (ref 4.4–5.9)
SODIUM SERPL-SCNC: 136 MMOL/L (ref 133–144)
TRANSFUSION STATUS PATIENT QL: NORMAL
TRANSFUSION STATUS PATIENT QL: NORMAL
WBC # BLD AUTO: 6.3 10E9/L (ref 4–11)

## 2018-10-04 PROCEDURE — 25000128 H RX IP 250 OP 636: Performed by: INTERNAL MEDICINE

## 2018-10-04 PROCEDURE — A9270 NON-COVERED ITEM OR SERVICE: HCPCS | Mod: GY | Performed by: HOSPITALIST

## 2018-10-04 PROCEDURE — 83880 ASSAY OF NATRIURETIC PEPTIDE: CPT | Performed by: HOSPITALIST

## 2018-10-04 PROCEDURE — 85730 THROMBOPLASTIN TIME PARTIAL: CPT | Performed by: ANESTHESIOLOGY

## 2018-10-04 PROCEDURE — 25000128 H RX IP 250 OP 636: Performed by: SURGERY

## 2018-10-04 PROCEDURE — 25000132 ZZH RX MED GY IP 250 OP 250 PS 637: Mod: GY | Performed by: INTERNAL MEDICINE

## 2018-10-04 PROCEDURE — A9270 NON-COVERED ITEM OR SERVICE: HCPCS | Mod: GY | Performed by: INTERNAL MEDICINE

## 2018-10-04 PROCEDURE — 00000146 ZZHCL STATISTIC GLUCOSE BY METER IP

## 2018-10-04 PROCEDURE — 12000000 ZZH R&B MED SURG/OB

## 2018-10-04 PROCEDURE — 36415 COLL VENOUS BLD VENIPUNCTURE: CPT | Performed by: HOSPITALIST

## 2018-10-04 PROCEDURE — 71045 X-RAY EXAM CHEST 1 VIEW: CPT

## 2018-10-04 PROCEDURE — 99233 SBSQ HOSP IP/OBS HIGH 50: CPT | Performed by: INTERNAL MEDICINE

## 2018-10-04 PROCEDURE — 80048 BASIC METABOLIC PNL TOTAL CA: CPT | Performed by: HOSPITALIST

## 2018-10-04 PROCEDURE — 36415 COLL VENOUS BLD VENIPUNCTURE: CPT | Performed by: ANESTHESIOLOGY

## 2018-10-04 PROCEDURE — 85027 COMPLETE CBC AUTOMATED: CPT | Performed by: HOSPITALIST

## 2018-10-04 PROCEDURE — 71045 X-RAY EXAM CHEST 1 VIEW: CPT | Mod: 77

## 2018-10-04 PROCEDURE — 25000132 ZZH RX MED GY IP 250 OP 250 PS 637: Mod: GY | Performed by: HOSPITALIST

## 2018-10-04 PROCEDURE — 99222 1ST HOSP IP/OBS MODERATE 55: CPT | Performed by: INTERNAL MEDICINE

## 2018-10-04 PROCEDURE — P9016 RBC LEUKOCYTES REDUCED: HCPCS | Performed by: INTERNAL MEDICINE

## 2018-10-04 PROCEDURE — 85610 PROTHROMBIN TIME: CPT | Performed by: ANESTHESIOLOGY

## 2018-10-04 PROCEDURE — 25000131 ZZH RX MED GY IP 250 OP 636 PS 637: Mod: GY | Performed by: HOSPITALIST

## 2018-10-04 RX ORDER — SODIUM CHLORIDE, SODIUM LACTATE, POTASSIUM CHLORIDE, CALCIUM CHLORIDE 600; 310; 30; 20 MG/100ML; MG/100ML; MG/100ML; MG/100ML
INJECTION, SOLUTION INTRAVENOUS CONTINUOUS
Status: DISCONTINUED | OUTPATIENT
Start: 2018-10-04 | End: 2018-10-04 | Stop reason: HOSPADM

## 2018-10-04 RX ORDER — FUROSEMIDE 10 MG/ML
40 INJECTION INTRAMUSCULAR; INTRAVENOUS ONCE
Status: COMPLETED | OUTPATIENT
Start: 2018-10-04 | End: 2018-10-04

## 2018-10-04 RX ORDER — FUROSEMIDE 10 MG/ML
40 INJECTION INTRAMUSCULAR; INTRAVENOUS ONCE
Status: DISCONTINUED | OUTPATIENT
Start: 2018-10-04 | End: 2018-10-04

## 2018-10-04 RX ORDER — PANTOPRAZOLE SODIUM 40 MG/1
40 TABLET, DELAYED RELEASE ORAL
Status: DISCONTINUED | OUTPATIENT
Start: 2018-10-04 | End: 2018-10-04 | Stop reason: DRUGHIGH

## 2018-10-04 RX ORDER — FUROSEMIDE 10 MG/ML
40 INJECTION INTRAMUSCULAR; INTRAVENOUS
Status: DISCONTINUED | OUTPATIENT
Start: 2018-10-04 | End: 2018-10-11

## 2018-10-04 RX ORDER — PANTOPRAZOLE SODIUM 40 MG/1
40 TABLET, DELAYED RELEASE ORAL
Status: DISCONTINUED | OUTPATIENT
Start: 2018-10-04 | End: 2018-10-16 | Stop reason: HOSPADM

## 2018-10-04 RX ADMIN — POTASSIUM CHLORIDE 20 MEQ: 1500 TABLET, EXTENDED RELEASE ORAL at 00:06

## 2018-10-04 RX ADMIN — MICONAZOLE NITRATE: 2 POWDER TOPICAL at 12:18

## 2018-10-04 RX ADMIN — CLOPIDOGREL 75 MG: 75 TABLET, FILM COATED ORAL at 09:55

## 2018-10-04 RX ADMIN — PIPERACILLIN SODIUM,TAZOBACTAM SODIUM 3.38 G: 3; .375 INJECTION, POWDER, FOR SOLUTION INTRAVENOUS at 03:13

## 2018-10-04 RX ADMIN — FUROSEMIDE 40 MG: 10 INJECTION, SOLUTION INTRAVENOUS at 17:03

## 2018-10-04 RX ADMIN — VANCOMYCIN HYDROCHLORIDE 1750 MG: 5 INJECTION, POWDER, LYOPHILIZED, FOR SOLUTION INTRAVENOUS at 01:00

## 2018-10-04 RX ADMIN — FUROSEMIDE 20 MG: 10 INJECTION, SOLUTION INTRAVENOUS at 03:52

## 2018-10-04 RX ADMIN — MICONAZOLE NITRATE: 2 POWDER TOPICAL at 17:11

## 2018-10-04 RX ADMIN — PANTOPRAZOLE SODIUM 40 MG: 40 TABLET, DELAYED RELEASE ORAL at 11:00

## 2018-10-04 RX ADMIN — PANTOPRAZOLE SODIUM 40 MG: 40 TABLET, DELAYED RELEASE ORAL at 17:04

## 2018-10-04 RX ADMIN — INSULIN ASPART 1 UNITS: 100 INJECTION, SOLUTION INTRAVENOUS; SUBCUTANEOUS at 08:15

## 2018-10-04 RX ADMIN — INSULIN GLARGINE 20 UNITS: 100 INJECTION, SOLUTION SUBCUTANEOUS at 21:25

## 2018-10-04 RX ADMIN — METOPROLOL SUCCINATE 100 MG: 100 TABLET, EXTENDED RELEASE ORAL at 21:24

## 2018-10-04 RX ADMIN — PIPERACILLIN SODIUM,TAZOBACTAM SODIUM 3.38 G: 3; .375 INJECTION, POWDER, FOR SOLUTION INTRAVENOUS at 20:13

## 2018-10-04 RX ADMIN — MELATONIN 5 MG TABLET 5 MG: at 21:25

## 2018-10-04 RX ADMIN — VITAMIN D, TAB 1000IU (100/BT) 1000 UNITS: 25 TAB at 09:56

## 2018-10-04 RX ADMIN — INSULIN ASPART 2 UNITS: 100 INJECTION, SOLUTION INTRAVENOUS; SUBCUTANEOUS at 14:24

## 2018-10-04 RX ADMIN — SODIUM CHLORIDE, POTASSIUM CHLORIDE, SODIUM LACTATE AND CALCIUM CHLORIDE: 600; 310; 30; 20 INJECTION, SOLUTION INTRAVENOUS at 08:54

## 2018-10-04 RX ADMIN — ATORVASTATIN CALCIUM 20 MG: 20 TABLET, FILM COATED ORAL at 09:55

## 2018-10-04 RX ADMIN — INSULIN ASPART 1 UNITS: 100 INJECTION, SOLUTION INTRAVENOUS; SUBCUTANEOUS at 21:25

## 2018-10-04 RX ADMIN — FUROSEMIDE 40 MG: 10 INJECTION, SOLUTION INTRAVENOUS at 10:01

## 2018-10-04 RX ADMIN — PIPERACILLIN SODIUM,TAZOBACTAM SODIUM 3.38 G: 3; .375 INJECTION, POWDER, FOR SOLUTION INTRAVENOUS at 08:13

## 2018-10-04 RX ADMIN — MICONAZOLE NITRATE: 2 POWDER TOPICAL at 21:25

## 2018-10-04 RX ADMIN — INSULIN ASPART 2 UNITS: 100 INJECTION, SOLUTION INTRAVENOUS; SUBCUTANEOUS at 17:24

## 2018-10-04 RX ADMIN — MICONAZOLE NITRATE: 2 POWDER TOPICAL at 00:06

## 2018-10-04 RX ADMIN — PIPERACILLIN SODIUM,TAZOBACTAM SODIUM 3.38 G: 3; .375 INJECTION, POWDER, FOR SOLUTION INTRAVENOUS at 14:24

## 2018-10-04 RX ADMIN — CITALOPRAM HYDROBROMIDE 10 MG: 10 TABLET ORAL at 09:56

## 2018-10-04 ASSESSMENT — ACTIVITIES OF DAILY LIVING (ADL)
ADLS_ACUITY_SCORE: 17

## 2018-10-04 NOTE — PLAN OF CARE
Problem: Patient Care Overview  Goal: Plan of Care/Patient Progress Review  Outcome: No Change  Surgery cancelled due to possible CHF.  IV lasix started BID.  Voiding well.  Continues on Plavix.  Cardiology consult pending.  , 194, appetite fair.  Soft stool x 2; incontinent.  Up with assist of 1/walker/GB; up to commode and walked in room to and from cart, otherwise declined to get in chair.  Right butt wound unchanged; frequent reminders to side positions.  Dressing on right foot not yet changed.  Dressing to left shin intact, wound improving.  VSS, afebrile, RA, no SOB, feels fatigued overall but states little sleep overnight.

## 2018-10-04 NOTE — CONSULTS
Cardio consult dictated  Will get echo and compare to prior  nt bnp elevated but with long term chf this is not unexpected  Need daily weights  No need for ischemia eval with recent stent/cath  NOTE HE IS ON PLAVIX make sure this is ok with surgeon

## 2018-10-04 NOTE — PLAN OF CARE
Problem: Patient Care Overview  Goal: Plan of Care/Patient Progress Review  Outcome: Improving  A&O. VSS on RA. , 191. Received One time dose of IV Lasix and receiving 1 unit of PRBC.  He has been NPO since midnight for RBKA planned for today. Dressing to Rt foot cdi. Denies any pain/discomfort. Replaced Potassium. Up with assist of 1. Discharge pending

## 2018-10-04 NOTE — OR NURSING
Procedure canceled due to CHF, and needs cardiology work up per Dr Aragon, REHAN, and Dr. Vick, surgeon

## 2018-10-04 NOTE — PLAN OF CARE
Problem: Patient Care Overview  Goal: Plan of Care/Patient Progress Review  Outcome: No Change  Care Plan Summary Note: vss, alert x4, denied pain. +BS, lungs diminished, on RA. Refused to get in chair for dinner. PIV patent and SL. Lasix IV given, voiding well, using urinal. /211, covered with sliding scale insulin. Redness blanchable in buttocks, encouraged to turn. Cardiology consulted, plan to have ECHO. Tele SR with BBB. Pt denied any SOB. Dressing changed to right foot. Pleasant with care. Monitor.

## 2018-10-04 NOTE — PROGRESS NOTES
Unfortunately Mr Manning could not be cleared for Anesthesia this am due to BNP of 21,539 ( 14,249 yesterday).  S/P BHAVNA this spring with echo this July showing EF 25%.  Pt states he was on Lasix 80 mg BID recently.  He is not septic or immediately threatened by his foot.  Surgery cancelled until he has been optimized from a cardiac standpoint.  I discussed this with Dr Aragon who curbsided Dr Muniz from CTS.  Our consensus agreement was that it was unsafe to proceed today.  Also discussed at length with Mr Manning and his sister in law.    Solis Vikc MD

## 2018-10-04 NOTE — PROGRESS NOTES
Events of the past 24 hours noted.  Unexplained drop in hemoglobin to 6.9.  No obvious blood loss.  Earlier today he received 1 unit of packed red cells with his hemoglobin increasing to 7.5.  NOTE  BNP OF 14,249.   We plan open right below-knee amputation tomorrow morning.  I would like to see him transfused with an additional unit of packed red cells given anticipated intraoperative blood loss and recent history of coronary stenting.  I defer to the hospitalist as to whether he should also have IV Lasix with the transfusion.    Solis Vick MD

## 2018-10-04 NOTE — PROVIDER NOTIFICATION
MD Notification    Notified Person: MD    Notified Person Name: Partha    Notification Date/Time: 10/3/18 0727pm    Notification Interaction: web page     Purpose of Notification: surgery called and would like you to order unit of PRBC; see surgery note details     Orders Received:    Comments: sumaya to take over as RN

## 2018-10-04 NOTE — CONSULTS
Consult Date:  10/04/2018      CARDIOLOGY CONSULTATION       REASON FOR CONSULTATION:  Cardiac clearance for planned surgery.      HISTORY:  This is a pleasant 77-year-old gentleman interviewed on the 6th floor of [facility].  I have been asked, the patient to determine if we can optimize his heart in preparation for planned partial amputation.  He is a patient of my partner, Dr. aVldes, from the [facility].  Per her note, she just saw him a few months ago.  He had coronary artery disease with ischemic cardiomyopathy, bypass grafting in 1995 x 3.  The details are not in the chart, but he also has had angioplasty and stenting.  He has a history of atrial flutter.  He has a Mobitz I Wenckebach not symptomatic, hypertension, diabetes, hyperlipidemia, diskitis, congestive heart failure with reduced ejection fraction and most importantly peripheral vascular disease.  He has had iliac stents on both sides in and left lower extremity bypass surgery.        His last echocardiogram dated 07/06/2018 demonstrates ejection fraction of 25%, moderate left ventricular enlargement, had diffuse hypokinesis.  Right ventricle was noted to be mildly dilated with moderate to severely reduced right ventricular ejection fraction.  There was biatrial enlargement, mild mitral regurgitation, aortic valve sclerosis without stenosis, mild tricuspid insufficiency, normal estimated right heart pressures.  His last angiogram was done 03/2018.  Right heart cath was performed at the same time, right atrial mean pressure was 17 which is elevated, RV pressures 55/18 which suggests he did indeed have pulmonary hypertension not noted on the echo.  The pulmonary capillary wedge pressure was 24.  The cardiac index by thermal was 1.7 and by Marisabel 2.1 suggesting some modest reduction.  It should be noted that he was 96 kg at the time of that and now he is down to 80 kg after therapy, so there is a reason to believe that the numbers  may have improved after that heart catheterization and by the time the echo was then done.  At the time of that heart catheterization they also placed a 3.0 x 20 mm Synergy stent in the LAD and a 2.75 x 38 mm stent in LAD.  I believe those stents to the LAD were placed via the graft to the LAD since the LAD itself was occluded proximally.  The circumflex was occluded proximally, but there was a graft to an OM.  The right coronary artery had 50% narrowing or less.   The [facility] uses a slightly different form than we are used to.  Reading to their form, I believe the right coronary had no more than 50% narrowing.  The left main had 50% narrowing or less.  The LAD lesion as I alluded to was repaired with stenting, a diagonal #1 was filled by a graft.  The left circumflex I believe was grafted.        Intermittently he has had difficulty with some of his heart failure medicines, i.e., some of the medicines would cause renal insufficiency to get worse, some would cause the blood pressure to drop and so there have been multiple changes in his medications because of that.  Recently though he states he may get a little lightheaded if he stands up and walks for a while, but no current recent syncope.  Denies undue shortness of breath.  He denies any angina.  When he saw Dr. Valdes in 07/2018, he appeared to be quite stable and she suggested that his medicines were stable and he was doing well.        The patient was admitted now for a diabetic right foot infection with osteomyelitis and a plan for possible amputation.  He has had debridement and again he has peripheral vascular disease as I alluded to above.  Blood cultures have been negative today.  Lactic acid negative.  Sed rate and CRP elevated, however.      PAST MEDICAL HISTORY:     1.  Cardiac as above.     2.  Peripheral vascular disease.     3.  Remote atrial flutter.     4.  Diabetes mellitus type 2.   5.  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   6.  Chronic renal  insufficiency.   7.  History of intermittent hyponatremia.   8.  Chronic anemia.   9.  Osteomyelitis as alluded to above.   10.  Hypertension.   11.  Hyperlipidemia   12.  Previous blood transfusion.   13.  CLL B-type, in remission.  No active treatment now.   14.  BPH.   15.  Arthritis.   16.  Cellulitis.   17.  Sebaceous cyst.   18.  Tonsillectomy   19.  Previous toe amputations.   20.  Cyst removal.     21.  Laminectomy.      ADMISSION MEDICATIONS:   1.  Tylenol p.r.n.   2.  Lipitor 20 mg daily.   3.  Vitamin D3, 1000 units daily.   4.  Celexa 10 mg daily.   5.  Plavix 75 mg daily.   7.  Lasix 40 mg daily.   8.  Hydrocortisone cream as needed.     9.  Melatonin as needed.     10.  Glucophage 500 mg b.i.d.   11.  Toprol- mg daily.   12.  Mycostatin cream p.r.n.   13.  Zantac 150 mg b.i.d. p.r.n.   14.  Coumadin in variable dose, which is on hold.        Here in the hospital he has also received IV Lasix, insulin, vancomycin and Zosyn.        ALLERGY:  NO MEDICATION ALLERGIES.      SOCIAL HISTORY:  Remote smoker, stopping in 1995.  Does not drink alcohol.  The patient is single.      FAMILY HISTORY:  Includes leukemia in his mother.      REVIEW OF SYSTEMS:  Outlined as above.      PHYSICAL EXAMINATION:   GENERAL:  Reveals a gentleman who lies comfortably in bed.     VITAL SIGNS:  As I mentioned, his weight is down significantly compared to his pre-heart cath including right heart cath and stent.  He is now 80.5 kg on admission.  He has not had a weight in the last 4 days, however.  Currently, blood pressure 120/57, heart rate 70, temperature 97.5.   SKIN:  No petechiae or rash.   HEENT:  Nonicteric.   NECK:  Supple without thyromegaly.   CHEST:  Exam is surprisingly clear at this point.   CARDIAC:  Regular rate and rhythm with a very minimal systolic murmur.  Carotid pulses 2+, questionable bruit on the right.  Femoral pulses are 1-2+ with bilateral bruits, more on the right than the left.  Dorsal pedal  pulses are trace.  Neck veins are relatively flat.   ABDOMEN:  Exam is nontender.  There is no organomegaly.   EXTREMITIES:  There is no edema.  His feet, both of them are wrapped in Ace wraps.  I did not uncover them.      LABORATORY DATA:  Today sodium 136, potassium 3.6, creatinine 1.14.  NT-BNP 21,539, also it is important to note that is compared to 22,172 in February around the time of his previous heart cath.  Although elevated, is not too dissimilar.  White count 6.3, hemoglobin 9.3, platelet count 103,000, MCV 87.        Chest x-ray dated 10/04 reports no acute infiltrates.  Sternotomy seen.  No mention of fluid effusions, etc.        EKG last in the chart is 09/22/2018 demonstrates sinus rhythm with Wenckebach, leftward axis, nonspecific ST-T changes V1-V6.        Echo is as above.      IMPRESSION:  The patient although having had an elevated BNP, has a known ischemic cardiomyopathy.  He did have pulmonary hypertension, but that is when his weight was up significantly and it appears that under Dr. Valdes's tutelage, his numbers have gotten better.  His weight is down and assuming that last echo was correct, his pulmonary pressures are improved.  They are requesting a current echo so we will plan on getting one tomorrow.  He was revascularized approximately 6 months ago, so I do not think additional stress testing would be necessary.        We will review the echocardiogram tomorrow and most likely clear him for surgery.  I would like to point out it is important to the surgeon to know that the patient is still getting his Plavix even though the warfarin was held and so now we want to make sure the surgeon is comfortable with that.  Otherwise, the Plavix would need to be held 5 days, although we would ideally like the patient to stay on Plavix for up to a year post-stent.  If they do need to move forward, there is some data suggesting it is reasonable this far out, i.e., roughly 6 months to take the patient  off Plavix.  If the surgeon decides to wait on the osteomyelitis surgery and stop the Plavix for 5 days, if they would be willing operate on aspirin that would be preferable and then after surgery they can restart the Plavix and Coumadin without aspirin.        In terms of the heart failure, he has received IV diuretics.  We will get daily weights so we have a better idea what his weight is going into surgery.  We will follow the BNP and we will see him tomorrow after the echocardiogram.  I want to make sure that we point out to the surgeon he is on Plavix if they are comfortable operating with that.  Otherwise, the protocol would be as above.  Unless there is a dramatic change in the echo compared to the previous one earlier in the year, I would plan on clearing the patient for his surgery.         MELA JACINTO MD             D: 10/04/2018   T: 10/04/2018   MT: MEGAN      Name:     PETER BUTLER   MRN:      [id number]        Account:       [id number]   :      1940           Consult Date:  10/04/2018      Document: [id number]       cc: Gene Valdes MD

## 2018-10-04 NOTE — PROGRESS NOTES
S-(situation):Patient went down for surgery, but the surgery was cancelled due to there was some fluid in his lungs.     B-(background): The patient was alert and orientated x3. Vital signs are stable. Monitoring input/output and output is increasing due to the lasix. Last output measured was 400 ml's.     A-(assessment):The problem is congestive heart failure.    R-(recommendations): Patient was put on IV lasix for the next day and then surgery will get rescheduled. Continue to assess.     Nimisha Jose

## 2018-10-04 NOTE — PROGRESS NOTES
Nurse paged stating that surgeon wanted Hgb to be > 8.0 prior to surgery tomorrow. Patient's Hgb after blood transfusion earlier today is 7.5. Will order an additional pRBC to be given tonight. Will also order Lasix 20mg IV to counter volume associated with blood transfusion; patient has CHF and elevated BNP this admission.   - Check CXR to evaluate for any evidence of pulm edema in AM

## 2018-10-04 NOTE — PROGRESS NOTES
[facility]  Hospitalist Progress Note   10/04/2018          Assessment and Plan:       Tad aMnning is a 77 year old male history of HTN, HFrEF at 20%, HLD, CAD s/p 3-vessel CABG (1995) and PCI to mLAD (3/2018), PAD s/p ileal artery stents, persistent atrial fibrillation, DM type II, CKD stage III, CLL (in remission), BPH, GERD, and anxiety admitted on 9/30/2018 for right foot pain.    Diabetic right foot infection   s/p bedside debridement (10/1)  Osteomyelitis right foot.  Peripheral vascular disease.s/p left LE bypass in 2014  [facility] for further evaluation of non-healing right foot ulcer.   , ESR 67 lactic acid 2.1, WBC 13.3  Blood culture 9/30 no growth.  MRI right foot Plantar lateral skin ulcer at the level of the fifth metatarsal head. Devitalized soft tissue thickening distal to the ulcer in the  plantar fifth toe. Nonspecific mild bone marrow edema in the fifth metatarsal head and proximal phalanx base may be reactive change alone but early osteomyelitis is not excluded. Probable postoperative changes in the soft tissues of the distal forefoot more medially.  US WALLACE - WALLACE on the left is within normal limits. WALLACE on the right would indicate moderate arterial insufficiency.     On IV vancomycin, IV Zosyn [9/30] to be continued.   Follow culture results.  Will consult infectious disease for antibiotic management.  Podiatry signed off [10/1], underwent bedside debridement.  Recommend nonweightbearing right foot.  Vascular surgery following along, recommend right below-knee amputation   Appreciate comanagement.  BKA was acncelled today as patient has mild pulmonary edema on chest xray and his BNP was 15k yesterday  He was on lasix 40mg PO daily PTA which has been on hold since admission  Given 2units of PRBCS transfuion yesterday with 20mg lasix IV which might be the cause for the pulmonary edema  Will give 40mg lasix IV  one time now   Cardiology consult today for pre op eval per vascular surgery request       Acute on chronic anemia.  Unclear etiology/possible competent of dilutional  Patient has a baseline hemoglobin between 9-10.   Hemoglobin 10.2 on 9/23, dropped to 8.6 on 10/1 and further drop of 6.9 today.  Patient does not have any blood loss at this time.  Has not been on IV fluids.  Repeat hemoglobin, will check TSH, vitamin B12, folic acid.  We will check proBNP, does not appear volume overloaded.  UA for evaluation of hematuria.  Fecal occult blood test requested.  Hold heparin drip given ongoing anemia.  Given 2units of PRBCS on 10/3 for HGB of 6.9. HGb stable at 9.5 today    Uncontrolled diabetes mellitus with hyperglycemia.  HgbA1C 9.0% on admission.   Home regimen includes metformin 500 mg BID.   At the time of admissions patient's blood sugars have been fluctuating between 200-400.  Insulin Lantus 20 units at bedtime [10/1] to be continued.  Insulin NovoLog 8 units 3 times a day with meals [10/2] to be continued.  Continue medium intensity insulin sliding scale.  Recommend discontinuing metformin at the time of discharge as patient having kidney injury.  Diabetic education provided, will need to see a nutritionist and closely follow-up as outpatient.    Persistent atrial fibrillation.  PTA Coumadin on hold  Was on IV heparin drip 9/30 -this morning heparin drip discontinued as hemoglobin dropped.  Did discuss concern for stroke while off anticoagulation with patient.    PTA was on digoxin as per recent discharge summary from [facility], unclear why it was discontinued.  Continue PTA metoprolol 100 mg oral daily  Telemetry monitoring.    Acute on chronic systolic congestive heart failure exacerbation:  Patient does not have any shortness of breath,  Echo 7/2018 with severely reduced EF, LVEF of 25%, RV dilatation with systolic dysfunction.  Continue PTA metoprolol 100 mg oral daily  PTA 40 mg oral Lasix on hold since  "admission.  CAD/ S/p 3-vessel CABG in 1995 and PCI to mLAD 3/2018  Continue PTA atorvastatin 20 mg oral daily, Plavix 75 mg oral daily.  If continues to have decreased hemoglobin we will consider holding Plavix.  Lasix 40mg IV daily ordered     PAD / hx of ileal artery stents.  Continue PTA Plavix 75 mg oral daily, atorvastatin 20 mg oral daily.    2nd Degree AV Block.   Noted to have abnormal conduction on telemetry 9/22 at [facility].  EKG then showed -sinus rhythm with 2nd degree AV block, appears to have been present on previous EKG back in 7/2018. His digoxin was then held, after discussion with cardiology, resumed on discharge. His primary cardiologist is Dr. Valdes. Recommend close follow up with OP cardiology, next appointment 10/22.     CKD III  Baseline had been about 1.4 (since 5/2018)  Monitor renal function periodically.  Avoid nephrotoxic drugs.    Mild pseudohyponatremia in the setting of hyperglycemia  Presented with sodium of 126 with elevated blood sugars.  Currently sodium 135     HLD  Continue PTA atorvastatin 20 mg oral daily.    Hypertension.  Continue PTA metoprolol 100 mg oral daily.  PTA Lasix on hold as above   Not on ACE inhibitor due to recent acute on chronic kidney injury.    BPH   Tamsulosin was recently discharged due to recurrent falls.  Timed voiding.      GERD  PTA ranitidine 150 mg twice daily continued.      Anxiety  Continued on his home regimen of citalopram 10 mg oral daily.  Discontinued Alphatrex recently due to high fall risk.    Physical deconditioning due to acute illness/chronic debility  Mechanical Falls.   Admitted from 9/21 - 9/24 at [facility] for acute on chronic kidney injury and mechanical fall/supratherapeutic INR.  He reported that his \"knees give out\".   CT head [care everywhere] negative 9/22.   Telemetry monitoring for arrhythmia.  PT and OT assessment at the time of discharge    Active Diet Order      Combination Diet 2141-8014 " Calories: Moderate Consistent CHO (4-6 CHO units/meal); 2 gm NA Diet    DVT Prophylaxis:   Code Status: Full Code  Disposition: Expected discharge in few days pending clinical improvement    Patient, interdisciplinary team involved in care and agrees with plan.  Total time -  35 min. More than 50% of time spent in direct patient care, care coordination, patient counseling, and formalizing plan of care.     Adelaida Soler MD        Interval History:      Patient continues to complain of pain in his right foot.  Denies any chest pain or shortness of breath.  BKA surgery cancelled today due to pulmonary edema          Physical Exam:        Physical Exam   Temp:  [97.3  F (36.3  C)-98.9  F (37.2  C)] 97.3  F (36.3  C)  Pulse:  [58-75] 61  Heart Rate:  [56-62] 60  Resp:  [16-18] 17  BP: (105-145)/(46-63) 140/62  SpO2:  [96 %-100 %] 98 %    Intake/Output Summary (Last 24 hours) at 10/02/18 1529  Last data filed at 10/02/18 1100   Gross per 24 hour   Intake              240 ml   Output              750 ml   Net             -510 ml     Admission Weight: 80.5 kg (177 lb 8 oz)  Current Weight: 80.5 kg (177 lb 8 oz)    PHYSICAL EXAM  GENERAL: Patient is in no distress. Alert and oriented.  HEART: irregular rate and rhythm. S1S2.  Systolic murmur right sternal area.  LUNGS: Bilateral decreased breath sounds, no wheezing no crackles.  ABDOMEN: Soft, no abdominal tenderness, bowel sounds heard   NEURO: Moving all extremities.  No focal weakness.  EXTREMITIES: trace pedal edema.  Feeble peripheral pulses right leg.  SKIN: right foot dressing intact   Left foot with toe amputation       Medications:          atorvastatin  20 mg Oral Daily     cholecalciferol  1,000 Units Oral Daily     citalopram (celeXA) tablet 10 mg  10 mg Oral Daily     clopidogrel  75 mg Oral Daily     insulin aspart  8 Units Subcutaneous TID w/meals     insulin aspart  1-7 Units Subcutaneous TID AC     insulin aspart  1-5 Units Subcutaneous At Bedtime      insulin glargine  20 Units Subcutaneous At Bedtime     melatonin tablet 5 mg  5 mg Oral At Bedtime     metoprolol succinate  100 mg Oral Daily     miconazole   Topical TID     pantoprazole  40 mg Oral BID AC     piperacillin-tazobactam  3.375 g Intravenous Q6H     vancomycin (VANCOCIN) IV  1,750 mg Intravenous Q24H     acetaminophen, bisacodyl, glucose **OR** dextrose **OR** glucagon, naloxone, ondansetron **OR** ondansetron, oxyCODONE IR, potassium chloride, potassium chloride with lidocaine, potassium chloride, potassium chloride, potassium chloride, senna-docusate **OR** senna-docusate         Data:      All new lab and imaging data was reviewed.

## 2018-10-04 NOTE — PHARMACY-VANCOMYCIN DOSING SERVICE
Pharmacy Vancomycin Note  Date of Service 2018  Patient's  1940   77 year old, male    Indication: Osteomyelitis  Goal Trough Level: 15-20 mg/L  Day of Therapy: 4  Current Vancomycin regimen:  1750 mg IV q24h    Current estimated CrCl = Estimated Creatinine Clearance: 56.8 mL/min (based on Cr of 1.28).    Creatinine for last 3 days  10/1/2018:  8:52 AM Creatinine 1.60 mg/dL  10/2/2018:  6:26 AM Creatinine 1.50 mg/dL  10/3/2018:  6:36 AM Creatinine 1.28 mg/dL    Recent Vancomycin Levels (past 3 days)  10/3/2018: 10:40 PM Vancomycin Level 15.6 mg/L    Vancomycin IV Administrations (past 72 hours)      No vancomycin orders with administrations in past 72 hours.                Nephrotoxins and other renal medications (Future)    Start     Dose/Rate Route Frequency Ordered Stop    10/03/18 2215  furosemide (LASIX) injection 20 mg      20 mg  over 1-2 Minutes Intravenous ONCE 10/03/18 2206      18 2215  piperacillin-tazobactam (ZOSYN) 3.375 g vial to attach to  mL bag     Comments:  Pharmacy can adjust dose based on renal function.    3.375 g  over 30 Minutes Intravenous EVERY 6 HOURS 18 2204               Contrast Orders - past 72 hours (72h ago through future)    Start     Dose/Rate Route Frequency Ordered Stop    10/01/18 0630  gadobutrol (GADAVIST) injection 8 mL      8 mL Intravenous ONCE 10/01/18 0630 10/01/18 0632          Interpretation of levels and current regimen:  Trough level is  Therapeutic    Has serum creatinine changed > 50% in last 72 hours: No    Urine output:  good urine output    Renal Function: Stable    Plan:  1.  Continue Current Dose  2.  Pharmacy will check trough levels as appropriate in 3-5 Days.    3. Serum creatinine levels will be ordered daily for the first week of therapy and at least twice weekly for subsequent weeks.      Moody Webster PharmD        .

## 2018-10-04 NOTE — PROGRESS NOTES
"Mr. Manning is a very pleasant gentleman who was scheduled for a lower extremity amputation this morning, unfortunately he was not optimized and his case was cancelled.  In reviewing his chart, his BNP was <15,000 yesterday, his Hbg was low as well requiring transfusion.  With the transfusion, he was given 20mg of IV Lasix.  His last ECHO (July, 2018) shows an EF of 25%.  He had a 3v CABG in 1995 and two BHAVNA placed in March of 2018.  After which he said he has had an \"up and down course,\" so it is unclear if this EF is his baseline.  He states that before comming into the hospital, he could not walk the length of a city block, nor could he do that now due to SOB.  He told Dr. Vick and I that he was on 80mg of Lasix PO BID before coming into the hospital and that this has not been continued.  He denies any SOB now, but stated he has not been up walking the halls much this admission.  His repeat BNP today was over 21,500.  Due to this volume overload, in a patient with a long cardiac history, his case had to be cancelled.  He also has not had a recent echo to evaluate the effect of this on his ventricular function.  It is my recommendation to delay the case for a few days, diurese the patient and redo a surface echo to evaluate heart function, before coming to the OR.      Ronaldo Aragon MD           "

## 2018-10-04 NOTE — PROVIDER NOTIFICATION
MD Notification    Notified Person: MD    Notified Person Name: Dr. Wray     Notification Date/Time: 10/3/18 at 9:54p    Notification Interaction: Phone    Purpose of Notification: Following up on request from Vascular surgeon to Transfuse a unit of packed red cells in anticipation of blood loose during surgery tomorrow and to consider giving IV lasix with transfusion. See note from Vascular Surgeon    Orders Received:    Comments: Awaiting return call

## 2018-10-05 ENCOUNTER — APPOINTMENT (OUTPATIENT)
Dept: CARDIOLOGY | Facility: CLINIC | Age: 78
DRG: 854 | End: 2018-10-05
Attending: NURSE PRACTITIONER
Payer: MEDICARE

## 2018-10-05 LAB
ANION GAP SERPL CALCULATED.3IONS-SCNC: 10 MMOL/L (ref 3–14)
BUN SERPL-MCNC: 16 MG/DL (ref 7–30)
CALCIUM SERPL-MCNC: 8.8 MG/DL (ref 8.5–10.1)
CHLORIDE SERPL-SCNC: 103 MMOL/L (ref 94–109)
CO2 SERPL-SCNC: 24 MMOL/L (ref 20–32)
CREAT SERPL-MCNC: 1.21 MG/DL (ref 0.66–1.25)
ERYTHROCYTE [DISTWIDTH] IN BLOOD BY AUTOMATED COUNT: 15.8 % (ref 10–15)
GFR SERPL CREATININE-BSD FRML MDRD: 58 ML/MIN/1.7M2
GLUCOSE BLDC GLUCOMTR-MCNC: 130 MG/DL (ref 70–99)
GLUCOSE BLDC GLUCOMTR-MCNC: 148 MG/DL (ref 70–99)
GLUCOSE BLDC GLUCOMTR-MCNC: 150 MG/DL (ref 70–99)
GLUCOSE BLDC GLUCOMTR-MCNC: 222 MG/DL (ref 70–99)
GLUCOSE BLDC GLUCOMTR-MCNC: 252 MG/DL (ref 70–99)
GLUCOSE SERPL-MCNC: 161 MG/DL (ref 70–99)
HCT VFR BLD AUTO: 29.8 % (ref 40–53)
HGB BLD-MCNC: 10.1 G/DL (ref 13.3–17.7)
MCH RBC QN AUTO: 29.7 PG (ref 26.5–33)
MCHC RBC AUTO-ENTMCNC: 33.9 G/DL (ref 31.5–36.5)
MCV RBC AUTO: 88 FL (ref 78–100)
PLATELET # BLD AUTO: 130 10E9/L (ref 150–450)
POTASSIUM SERPL-SCNC: 3.5 MMOL/L (ref 3.4–5.3)
RBC # BLD AUTO: 3.4 10E12/L (ref 4.4–5.9)
SODIUM SERPL-SCNC: 137 MMOL/L (ref 133–144)
WBC # BLD AUTO: 7.6 10E9/L (ref 4–11)

## 2018-10-05 PROCEDURE — A9270 NON-COVERED ITEM OR SERVICE: HCPCS | Mod: GY | Performed by: INTERNAL MEDICINE

## 2018-10-05 PROCEDURE — 25000131 ZZH RX MED GY IP 250 OP 636 PS 637: Mod: GY | Performed by: HOSPITALIST

## 2018-10-05 PROCEDURE — 99232 SBSQ HOSP IP/OBS MODERATE 35: CPT | Mod: 25 | Performed by: INTERNAL MEDICINE

## 2018-10-05 PROCEDURE — 99233 SBSQ HOSP IP/OBS HIGH 50: CPT | Performed by: INTERNAL MEDICINE

## 2018-10-05 PROCEDURE — 00000146 ZZHCL STATISTIC GLUCOSE BY METER IP

## 2018-10-05 PROCEDURE — 25000128 H RX IP 250 OP 636: Performed by: INTERNAL MEDICINE

## 2018-10-05 PROCEDURE — 93325 DOPPLER ECHO COLOR FLOW MAPG: CPT | Mod: 26 | Performed by: INTERNAL MEDICINE

## 2018-10-05 PROCEDURE — 40000264 ECHO LIMITED WITH OPTISON

## 2018-10-05 PROCEDURE — 25000132 ZZH RX MED GY IP 250 OP 250 PS 637: Mod: GY | Performed by: INTERNAL MEDICINE

## 2018-10-05 PROCEDURE — 93308 TTE F-UP OR LMTD: CPT | Mod: 26 | Performed by: INTERNAL MEDICINE

## 2018-10-05 PROCEDURE — 12000000 ZZH R&B MED SURG/OB

## 2018-10-05 PROCEDURE — 36415 COLL VENOUS BLD VENIPUNCTURE: CPT | Performed by: INTERNAL MEDICINE

## 2018-10-05 PROCEDURE — 85027 COMPLETE CBC AUTOMATED: CPT | Performed by: INTERNAL MEDICINE

## 2018-10-05 PROCEDURE — 80048 BASIC METABOLIC PNL TOTAL CA: CPT | Performed by: INTERNAL MEDICINE

## 2018-10-05 PROCEDURE — 25500064 ZZH RX 255 OP 636: Performed by: INTERNAL MEDICINE

## 2018-10-05 PROCEDURE — 93321 DOPPLER ECHO F-UP/LMTD STD: CPT | Mod: 26 | Performed by: INTERNAL MEDICINE

## 2018-10-05 RX ADMIN — CLOPIDOGREL 75 MG: 75 TABLET, FILM COATED ORAL at 08:38

## 2018-10-05 RX ADMIN — FUROSEMIDE 40 MG: 10 INJECTION, SOLUTION INTRAVENOUS at 16:42

## 2018-10-05 RX ADMIN — PIPERACILLIN SODIUM,TAZOBACTAM SODIUM 3.38 G: 3; .375 INJECTION, POWDER, FOR SOLUTION INTRAVENOUS at 08:39

## 2018-10-05 RX ADMIN — INSULIN ASPART 3 UNITS: 100 INJECTION, SOLUTION INTRAVENOUS; SUBCUTANEOUS at 12:59

## 2018-10-05 RX ADMIN — MELATONIN 5 MG TABLET 5 MG: at 21:56

## 2018-10-05 RX ADMIN — VITAMIN D, TAB 1000IU (100/BT) 1000 UNITS: 25 TAB at 08:38

## 2018-10-05 RX ADMIN — MICONAZOLE NITRATE: 2 POWDER TOPICAL at 20:58

## 2018-10-05 RX ADMIN — PIPERACILLIN SODIUM,TAZOBACTAM SODIUM 3.38 G: 3; .375 INJECTION, POWDER, FOR SOLUTION INTRAVENOUS at 20:57

## 2018-10-05 RX ADMIN — MICONAZOLE NITRATE: 2 POWDER TOPICAL at 22:02

## 2018-10-05 RX ADMIN — METOPROLOL SUCCINATE 100 MG: 100 TABLET, EXTENDED RELEASE ORAL at 20:59

## 2018-10-05 RX ADMIN — PIPERACILLIN SODIUM,TAZOBACTAM SODIUM 3.38 G: 3; .375 INJECTION, POWDER, FOR SOLUTION INTRAVENOUS at 14:00

## 2018-10-05 RX ADMIN — INSULIN ASPART 1 UNITS: 100 INJECTION, SOLUTION INTRAVENOUS; SUBCUTANEOUS at 09:26

## 2018-10-05 RX ADMIN — INSULIN ASPART 2 UNITS: 100 INJECTION, SOLUTION INTRAVENOUS; SUBCUTANEOUS at 17:38

## 2018-10-05 RX ADMIN — ATORVASTATIN CALCIUM 20 MG: 20 TABLET, FILM COATED ORAL at 08:38

## 2018-10-05 RX ADMIN — MICONAZOLE NITRATE: 2 POWDER TOPICAL at 10:15

## 2018-10-05 RX ADMIN — HUMAN ALBUMIN MICROSPHERES AND PERFLUTREN 9 ML: 10; .22 INJECTION, SOLUTION INTRAVENOUS at 13:33

## 2018-10-05 RX ADMIN — PANTOPRAZOLE SODIUM 40 MG: 40 TABLET, DELAYED RELEASE ORAL at 16:42

## 2018-10-05 RX ADMIN — PANTOPRAZOLE SODIUM 40 MG: 40 TABLET, DELAYED RELEASE ORAL at 06:50

## 2018-10-05 RX ADMIN — VANCOMYCIN HYDROCHLORIDE 1750 MG: 5 INJECTION, POWDER, LYOPHILIZED, FOR SOLUTION INTRAVENOUS at 00:38

## 2018-10-05 RX ADMIN — PIPERACILLIN SODIUM,TAZOBACTAM SODIUM 3.38 G: 3; .375 INJECTION, POWDER, FOR SOLUTION INTRAVENOUS at 03:33

## 2018-10-05 RX ADMIN — FUROSEMIDE 40 MG: 10 INJECTION, SOLUTION INTRAVENOUS at 08:39

## 2018-10-05 RX ADMIN — CITALOPRAM HYDROBROMIDE 10 MG: 10 TABLET ORAL at 08:38

## 2018-10-05 RX ADMIN — INSULIN GLARGINE 20 UNITS: 100 INJECTION, SOLUTION SUBCUTANEOUS at 21:56

## 2018-10-05 ASSESSMENT — ACTIVITIES OF DAILY LIVING (ADL)
ADLS_ACUITY_SCORE: 17

## 2018-10-05 NOTE — PROGRESS NOTES
[facility]  Cardiology Progress Note         Assessment and Plan:     Probable Osteomyelitis Right Foot    DM type 2 w neuropathy -- Hgb A1C 9.0 on 9/22/18    HTN (hypertension)    CAD -- S/P CABG    Osteomyelitis left 2nd Toe s/p amputaion 1/10/14    CRF (chronic renal failure), stage 3 (moderate) (H)    Chronic systolic heart failure (H)    Atrial fibrillation -- on Warfarin    Hyponatremia      Current echo shows mild improved EF now in 30's, prior 20's and mild RV EF reduction  No sig valvular heart dz and they were unable to assess for pulm htn but earlier echo was better.  Unfortunately he wasn't routinely weighed so I cant tell on iv lasix how his diuresis is going. (weight up per admit but dont know how reliable that was) his bnp is chronic elevated. BP looks ok  I would rec continued iv lasix over weekend and DAILY weights  Watch bmp and weights and clinical exam to determine diuretic dose  REMEMBER HE IS ON PLAVIX (planned surgery Monday)-see my suggestions on consult note.  CARDIOLOGY WILL SIGN OFF, PLEASE CALL PRN               Interval History:   No new issue              Medications:   Scheduled Meds:     atorvastatin  20 mg Oral Daily     cholecalciferol  1,000 Units Oral Daily     citalopram (celeXA) tablet 10 mg  10 mg Oral Daily     clopidogrel  75 mg Oral Daily     furosemide  40 mg Intravenous BID     insulin aspart  8 Units Subcutaneous TID w/meals     insulin aspart  1-7 Units Subcutaneous TID AC     insulin aspart  1-5 Units Subcutaneous At Bedtime     insulin glargine  20 Units Subcutaneous At Bedtime     melatonin tablet 5 mg  5 mg Oral At Bedtime     metoprolol succinate  100 mg Oral Daily     miconazole   Topical TID     pantoprazole  40 mg Oral BID AC     piperacillin-tazobactam  3.375 g Intravenous Q6H     vancomycin (VANCOCIN) IV  1,750 mg Intravenous Q24H     PRN Meds: acetaminophen, bisacodyl, glucose **OR** dextrose **OR** glucagon, naloxone, ondansetron **OR**  ondansetron, oxyCODONE IR, potassium chloride, potassium chloride with lidocaine, potassium chloride, potassium chloride, potassium chloride, senna-docusate **OR** senna-docusate         Physical Exam:   Blood pressure 137/61, pulse 61, temperature 98.2  F (36.8  C), temperature source Oral, resp. rate 16, height 1.829 m (6'), weight 84.4 kg (186 lb 1.6 oz), SpO2 97 %.  Vitals:    18 2140 10/05/18 0607   Weight: 80.5 kg (177 lb 8 oz) 84.4 kg (186 lb 1.6 oz)       Intake/Output Summary (Last 24 hours) at 10/05/18 1521  Last data filed at 10/05/18 1400   Gross per 24 hour   Intake             1210 ml   Output             2575 ml   Net            -1365 ml           Vital Sign Ranges  Temperature Temp  Av  F (36.7  C)  Min: 97.5  F (36.4  C)  Max: 98.3  F (36.8  C)   Blood pressure Systolic (24hrs), Av , Min:120 , Max:139        Diastolic (24hrs), Av, Min:57, Max:61      Pulse Pulse  Av  Min: 61  Max: 70   Respirations Resp  Av  Min: 16  Max: 16   Pulse oximetry SpO2  Av %  Min: 96 %  Max: 98 %             Constitutional: Awake, alert, cooperative, no apparent distress       Skin: No rash, petechia, cyanosis       Neck: No thyromegaly or adenopathy       Lungs: No change from 10-5       Cardiovasc: No change       Abdomen: Normal bowel sounds, soft, non-distended, non-tender, no hepatomegaly       Neuro: Alert and oriented x3, non focal exam               Other:             Data:     Recent Labs   Lab Test  10/05/18   0912  10/04/18   0839   10/02/18   0626   WBC  7.6  6.3   < >   --    HGB  10.1*  9.3*   < >   --    MCV  88  87   < >   --    PLT  130*  103*   < >   --    INR   --   1.37*   --   1.44*    < > = values in this interval not displayed.      Recent Labs   Lab Test  10/05/18   0912  10/04/18   0839   NA  137  136   POTASSIUM  3.5  3.6   CHLORIDE  103  104   BUN  16  17   CR  1.21  1.14       Recent Labs  Lab 10/05/18  0912 10/04/18  0839 10/03/18  0636 10/01/18  0852   GLC  161* 159* 135* 295*     Recent Labs   Lab Test  10/03/18   0636  09/22/18   0042   ALT  14  27   AST  15  11     No components found for: TROPONINIES    Lab Results   Component Value Date    CHOL 65 04/09/2018     Lab Results   Component Value Date    HDL 30 04/09/2018     Lab Results   Component Value Date    LDL 23 04/09/2018     Lab Results   Component Value Date    TRIG 58 04/09/2018     Lab Results   Component Value Date    CHOLHDLRATIO 3.9 05/23/2014          TSH   Date Value Ref Range Status   10/03/2018 1.11 0.40 - 4.00 mU/L Final

## 2018-10-05 NOTE — PLAN OF CARE
Problem: Patient Care Overview  Goal: Plan of Care/Patient Progress Review  Outcome: No Change  Pt a&o. Up with one and walker. Up in chair for meals. Denies pain. Dressings on coccyx, left shin and right foot changed this shift. long-term/2gm NA diet, good appetite. bgm covered appropriately. Plan is to continue IV antibiotics and possible surgery Monday. Pt calls appropriately, will continue to monitor and support.

## 2018-10-05 NOTE — PROGRESS NOTES
Vascular Surgery Note    We will plan for RLE BKA on Monday 10/8, once patient is medically optimized. Please contact vascular surgery, if patient is not ready by Monday.

## 2018-10-05 NOTE — PROGRESS NOTES
SW:  Angelique from [facility] ([phone]) called for an update.   Explained patient will have a BKA and in patient rehab is anticipated following hospitalization.   involved with final discharge plan will be asked to keep [facility] updated.

## 2018-10-05 NOTE — PLAN OF CARE
Problem: Patient Care Overview  Goal: Plan of Care/Patient Progress Review  Outcome: No Change  A&O x 4. VSS on room air. Denied pain. Tele SR with BBB. . Baseline numbness to hands and feet. Dressings CDI. Voided using urinal. Continues on IV Vancomycin and IV Zosyn.  Up with stand by assist, walker and gait belt. Slept well. ECHO pending. Cardiology following. Will continue to monitor.

## 2018-10-05 NOTE — PROGRESS NOTES
[facility]  Hospitalist Progress Note   10/05/2018          Assessment and Plan:       Tad Manning is a 77 year old male history of HTN, HFrEF at 20%, HLD, CAD s/p 3-vessel CABG (1995) and PCI to mLAD (3/2018), PAD s/p ileal artery stents, persistent atrial fibrillation, DM type II, CKD stage III, CLL (in remission), BPH, GERD, and anxiety admitted on 9/30/2018 for right foot pain.    Diabetic right foot infection   s/p bedside debridement (10/1)  Osteomyelitis right foot.  Peripheral vascular disease.s/p left LE bypass in 2014  Patient transferred from [facility] to [facility] for further evaluation of non-healing right foot ulcer.   , ESR 67 lactic acid 2.1, WBC 13.3  Blood culture 9/30 no growth.  MRI right foot Plantar lateral skin ulcer at the level of the fifth metatarsal head. Devitalized soft tissue thickening distal to the ulcer in the  plantar fifth toe. Nonspecific mild bone marrow edema in the fifth metatarsal head and proximal phalanx base may be reactive change alone but early osteomyelitis is not excluded. Probable postoperative changes in the soft tissues of the distal forefoot more medially.  US WALLACE - WALLACE on the left is within normal limits. WALLACE on the right would indicate moderate arterial insufficiency.     On IV vancomycin, IV Zosyn [9/30] to be continued.   Follow culture results.  Will consult infectious disease for antibiotic management.  Podiatry signed off [10/1], underwent bedside debridement.  Recommend nonweightbearing right foot.  Vascular surgery following along, recommend right below-knee amputation   Appreciate comanagement.  BKA was cancelled  on 10/4 as patient has mild pulmonary edema on chest xray and his BNP was 15k yesterday  He was on lasix 40mg PO daily PTA which has been on hold since admission  Given 2units of PRBCS transfuion on 10/3 with 40mg lasix IV inbetween which might be the cause for the pulmonary edema  Started On  lasix 40mg IV BID for diuresis on 10/4/18  Cardiology consulted  for pre op eval per vascular surgery request   Vascular surgery wants to BKA on MOnday if he remains stable   Echo ordered results pending     Acute on chronic anemia.  Unclear etiology/possible competent of dilutional  Patient has a baseline hemoglobin between 9-10.   Hemoglobin 10.2 on 9/23, dropped to 8.6 on 10/1 and further drop of 6.9 today.  Patient does not have any blood loss at this time.  Has not been on IV fluids.  Repeat hemoglobin, will check TSH, vitamin B12, folic acid.  We will check proBNP, does not appear volume overloaded.  UA for evaluation of hematuria.  Fecal occult blood test requested.  Hold heparin drip given ongoing anemia.  Given 2units of PRBCS on 10/3 for HGB of 6.9. HGb stable at 9.5 today    Uncontrolled diabetes mellitus with hyperglycemia.  HgbA1C 9.0% on admission.   Home regimen includes metformin 500 mg BID.   At the time of admissions patient's blood sugars have been fluctuating between 200-400.  Insulin Lantus 20 units at bedtime [10/1] to be continued.  Insulin NovoLog 8 units 3 times a day with meals [10/2] to be continued.  Continue medium intensity insulin sliding scale.  Recommend discontinuing metformin at the time of discharge as patient having kidney injury.  Diabetic education provided, will need to see a nutritionist and closely follow-up as outpatient.  Blood sugars are well controlled today   Persistent atrial fibrillation.  PTA Coumadin on hold  Was on IV heparin drip 9/30 -this morning heparin drip discontinued as hemoglobin dropped.  Did discuss concern for stroke while off anticoagulation with patient.    PTA was on digoxin as per recent discharge summary from [facility], unclear why it was discontinued.  Continue PTA metoprolol 100 mg oral daily  Telemetry monitoring.  HR well controlled in the 60s to 70s today   Acute on chronic systolic congestive heart failure exacerbation:  Patient does not have any  shortness of breath,  Echo 7/2018 with severely reduced EF, LVEF of 25%, RV dilatation with systolic dysfunction.  Continue PTA metoprolol 100 mg oral daily  PTA 40 mg oral Lasix on hold since admission.  CAD/ S/p 3-vessel CABG in 1995 and PCI to mLAD 3/2018  Continue PTA atorvastatin 20 mg oral daily, Plavix 75 mg oral daily.  If continues to have decreased hemoglobin we will consider holding Plavix.  Lasix 40mg IV BID  Cardiology consulted and are following  Echo results pending     PAD / hx of ileal artery stents.  Continue PTA Plavix 75 mg oral daily, atorvastatin 20 mg oral daily.  Pt needs to be on plavix before and after surgery due to the stents. Discussed with  today on phone   2nd Degree AV Block.   Noted to have abnormal conduction on telemetry 9/22 at [facility].  EKG then showed -sinus rhythm with 2nd degree AV block, appears to have been present on previous EKG back in 7/2018. His digoxin was then held, after discussion with cardiology, resumed on discharge. His primary cardiologist is Dr. Valdes. Recommend close follow up with OP cardiology, next appointment 10/22.     CKD III  Baseline had been about 1.4 (since 5/2018)  Monitor renal function periodically.  Avoid nephrotoxic drugs.    Mild pseudohyponatremia in the setting of hyperglycemia  Presented with sodium of 126 with elevated blood sugars.  Currently sodium 135     HLD  Continue PTA atorvastatin 20 mg oral daily.    Hypertension.  Continue PTA metoprolol 100 mg oral daily.  PTA Lasix on hold as above   Not on ACE inhibitor due to recent acute on chronic kidney injury.    BPH   Tamsulosin was recently discharged due to recurrent falls.  Timed voiding.      GERD  PTA ranitidine 150 mg twice daily continued.      Anxiety  Continued on his home regimen of citalopram 10 mg oral daily.  Discontinued Alphatrex recently due to high fall risk.    Physical deconditioning due to acute illness/chronic debility  Mechanical Falls.   Admitted from 9/21 -  "9/24 at [facility] for acute on chronic kidney injury and mechanical fall/supratherapeutic INR.  He reported that his \"knees give out\".   CT head [care everywhere] negative 9/22.   Telemetry monitoring for arrhythmia.  PT and OT assessment at the time of discharge    Active Diet Order      Combination Diet 4906-0110 Calories: Moderate Consistent CHO (4-6 CHO units/meal); 2 gm NA Diet    DVT Prophylaxis:   Code Status: Full Code  Disposition: Expected discharge in 4-5days after BKA     Patient, interdisciplinary team involved in care and agrees with plan.  Total time -  35 min. More than 50% of time spent in direct patient care, care coordination, patient counseling, and formalizing plan of care.     Adelaida Soler MD        Interval History:      Dnies SOB or chest pain. Right foot pain well controlled          Physical Exam:        Physical Exam   Temp:  [97.5  F (36.4  C)-99.2  F (37.3  C)] 98.2  F (36.8  C)  Pulse:  [61-70] 61  Heart Rate:  [61-72] 61  Resp:  [14-16] 16  BP: (120-139)/(57-61) 137/61  SpO2:  [96 %-98 %] 97 %    Intake/Output Summary (Last 24 hours) at 10/02/18 1529  Last data filed at 10/02/18 1100   Gross per 24 hour   Intake              240 ml   Output              750 ml   Net             -510 ml     Admission Weight: 80.5 kg (177 lb 8 oz)  Current Weight: 80.5 kg (177 lb 8 oz)    PHYSICAL EXAM  GENERAL: Patient is in no distress. Alert and oriented.  HEART: irregular rate and rhythm. S1S2.  Systolic murmur right sternal area.  LUNGS: Bilateral decreased breath sounds, no wheezing no crackles.  ABDOMEN: Soft, no abdominal tenderness, bowel sounds heard   NEURO: Moving all extremities.  No focal weakness.  EXTREMITIES: trace pedal edema.  Feeble peripheral pulses right leg.  SKIN: right foot dressing intact   Left foot with past  toe amputation       Medications:          atorvastatin  20 mg Oral Daily     cholecalciferol  1,000 Units Oral Daily     citalopram " (celeXA) tablet 10 mg  10 mg Oral Daily     clopidogrel  75 mg Oral Daily     furosemide  40 mg Intravenous BID     insulin aspart  8 Units Subcutaneous TID w/meals     insulin aspart  1-7 Units Subcutaneous TID AC     insulin aspart  1-5 Units Subcutaneous At Bedtime     insulin glargine  20 Units Subcutaneous At Bedtime     melatonin tablet 5 mg  5 mg Oral At Bedtime     metoprolol succinate  100 mg Oral Daily     miconazole   Topical TID     pantoprazole  40 mg Oral BID AC     piperacillin-tazobactam  3.375 g Intravenous Q6H     vancomycin (VANCOCIN) IV  1,750 mg Intravenous Q24H     acetaminophen, bisacodyl, glucose **OR** dextrose **OR** glucagon, naloxone, ondansetron **OR** ondansetron, oxyCODONE IR, potassium chloride, potassium chloride with lidocaine, potassium chloride, potassium chloride, potassium chloride, senna-docusate **OR** senna-docusate         Data:      All new lab and imaging data was reviewed.

## 2018-10-06 LAB
ANION GAP SERPL CALCULATED.3IONS-SCNC: 8 MMOL/L (ref 3–14)
BUN SERPL-MCNC: 19 MG/DL (ref 7–30)
CALCIUM SERPL-MCNC: 8.2 MG/DL (ref 8.5–10.1)
CHLORIDE SERPL-SCNC: 103 MMOL/L (ref 94–109)
CO2 SERPL-SCNC: 25 MMOL/L (ref 20–32)
CREAT SERPL-MCNC: 1.2 MG/DL (ref 0.66–1.25)
ERYTHROCYTE [DISTWIDTH] IN BLOOD BY AUTOMATED COUNT: 15.9 % (ref 10–15)
GFR SERPL CREATININE-BSD FRML MDRD: 59 ML/MIN/1.7M2
GLUCOSE BLDC GLUCOMTR-MCNC: 117 MG/DL (ref 70–99)
GLUCOSE BLDC GLUCOMTR-MCNC: 137 MG/DL (ref 70–99)
GLUCOSE BLDC GLUCOMTR-MCNC: 156 MG/DL (ref 70–99)
GLUCOSE BLDC GLUCOMTR-MCNC: 166 MG/DL (ref 70–99)
GLUCOSE BLDC GLUCOMTR-MCNC: 212 MG/DL (ref 70–99)
GLUCOSE SERPL-MCNC: 170 MG/DL (ref 70–99)
HCT VFR BLD AUTO: 28.9 % (ref 40–53)
HGB BLD-MCNC: 9.7 G/DL (ref 13.3–17.7)
MCH RBC QN AUTO: 29.7 PG (ref 26.5–33)
MCHC RBC AUTO-ENTMCNC: 33.6 G/DL (ref 31.5–36.5)
MCV RBC AUTO: 88 FL (ref 78–100)
PLATELET # BLD AUTO: 132 10E9/L (ref 150–450)
POTASSIUM SERPL-SCNC: 3.3 MMOL/L (ref 3.4–5.3)
POTASSIUM SERPL-SCNC: 3.7 MMOL/L (ref 3.4–5.3)
RBC # BLD AUTO: 3.27 10E12/L (ref 4.4–5.9)
SODIUM SERPL-SCNC: 136 MMOL/L (ref 133–144)
VANCOMYCIN SERPL-MCNC: 18.2 MG/L
WBC # BLD AUTO: 5.8 10E9/L (ref 4–11)

## 2018-10-06 PROCEDURE — 25000132 ZZH RX MED GY IP 250 OP 250 PS 637: Mod: GY | Performed by: SURGERY

## 2018-10-06 PROCEDURE — 25000128 H RX IP 250 OP 636: Performed by: INTERNAL MEDICINE

## 2018-10-06 PROCEDURE — 36415 COLL VENOUS BLD VENIPUNCTURE: CPT | Performed by: SURGERY

## 2018-10-06 PROCEDURE — A9270 NON-COVERED ITEM OR SERVICE: HCPCS | Mod: GY | Performed by: INTERNAL MEDICINE

## 2018-10-06 PROCEDURE — 80048 BASIC METABOLIC PNL TOTAL CA: CPT | Performed by: INTERNAL MEDICINE

## 2018-10-06 PROCEDURE — 99207 ZZC CDG-MDM COMPONENT: MEETS MODERATE - UP CODED: CPT | Performed by: INTERNAL MEDICINE

## 2018-10-06 PROCEDURE — A9270 NON-COVERED ITEM OR SERVICE: HCPCS | Mod: GY | Performed by: SURGERY

## 2018-10-06 PROCEDURE — 85027 COMPLETE CBC AUTOMATED: CPT | Performed by: INTERNAL MEDICINE

## 2018-10-06 PROCEDURE — 99233 SBSQ HOSP IP/OBS HIGH 50: CPT | Performed by: INTERNAL MEDICINE

## 2018-10-06 PROCEDURE — 82565 ASSAY OF CREATININE: CPT | Performed by: INTERNAL MEDICINE

## 2018-10-06 PROCEDURE — 80202 ASSAY OF VANCOMYCIN: CPT | Performed by: INTERNAL MEDICINE

## 2018-10-06 PROCEDURE — 25000132 ZZH RX MED GY IP 250 OP 250 PS 637: Mod: GY | Performed by: HOSPITALIST

## 2018-10-06 PROCEDURE — A9270 NON-COVERED ITEM OR SERVICE: HCPCS | Mod: GY | Performed by: HOSPITALIST

## 2018-10-06 PROCEDURE — 12000000 ZZH R&B MED SURG/OB

## 2018-10-06 PROCEDURE — 25000132 ZZH RX MED GY IP 250 OP 250 PS 637: Mod: GY | Performed by: INTERNAL MEDICINE

## 2018-10-06 PROCEDURE — 99231 SBSQ HOSP IP/OBS SF/LOW 25: CPT | Performed by: SURGERY

## 2018-10-06 PROCEDURE — 25000131 ZZH RX MED GY IP 250 OP 636 PS 637: Mod: GY | Performed by: HOSPITALIST

## 2018-10-06 PROCEDURE — 36415 COLL VENOUS BLD VENIPUNCTURE: CPT | Performed by: INTERNAL MEDICINE

## 2018-10-06 PROCEDURE — 84132 ASSAY OF SERUM POTASSIUM: CPT | Performed by: SURGERY

## 2018-10-06 PROCEDURE — 00000146 ZZHCL STATISTIC GLUCOSE BY METER IP

## 2018-10-06 RX ORDER — CLOPIDOGREL BISULFATE 75 MG/1
75 TABLET ORAL DAILY
Status: DISCONTINUED | OUTPATIENT
Start: 2018-10-06 | End: 2018-10-16 | Stop reason: HOSPADM

## 2018-10-06 RX ORDER — FERROUS SULFATE 325(65) MG
325 TABLET ORAL DAILY
Status: DISCONTINUED | OUTPATIENT
Start: 2018-10-06 | End: 2018-10-16 | Stop reason: HOSPADM

## 2018-10-06 RX ADMIN — CITALOPRAM HYDROBROMIDE 10 MG: 10 TABLET ORAL at 09:52

## 2018-10-06 RX ADMIN — VANCOMYCIN HYDROCHLORIDE 1750 MG: 5 INJECTION, POWDER, LYOPHILIZED, FOR SOLUTION INTRAVENOUS at 00:27

## 2018-10-06 RX ADMIN — MICONAZOLE NITRATE: 2 POWDER TOPICAL at 19:57

## 2018-10-06 RX ADMIN — POTASSIUM CHLORIDE 20 MEQ: 1500 TABLET, EXTENDED RELEASE ORAL at 18:34

## 2018-10-06 RX ADMIN — MICONAZOLE NITRATE: 2 POWDER TOPICAL at 09:59

## 2018-10-06 RX ADMIN — PIPERACILLIN SODIUM,TAZOBACTAM SODIUM 3.38 G: 3; .375 INJECTION, POWDER, FOR SOLUTION INTRAVENOUS at 16:27

## 2018-10-06 RX ADMIN — INSULIN ASPART 2 UNITS: 100 INJECTION, SOLUTION INTRAVENOUS; SUBCUTANEOUS at 17:53

## 2018-10-06 RX ADMIN — POTASSIUM CHLORIDE 40 MEQ: 1500 TABLET, EXTENDED RELEASE ORAL at 16:34

## 2018-10-06 RX ADMIN — FUROSEMIDE 40 MG: 10 INJECTION, SOLUTION INTRAVENOUS at 16:30

## 2018-10-06 RX ADMIN — PANTOPRAZOLE SODIUM 40 MG: 40 TABLET, DELAYED RELEASE ORAL at 07:08

## 2018-10-06 RX ADMIN — METOPROLOL SUCCINATE 100 MG: 100 TABLET, EXTENDED RELEASE ORAL at 22:48

## 2018-10-06 RX ADMIN — VITAMIN D, TAB 1000IU (100/BT) 1000 UNITS: 25 TAB at 09:52

## 2018-10-06 RX ADMIN — MELATONIN 5 MG TABLET 5 MG: at 22:48

## 2018-10-06 RX ADMIN — MICONAZOLE NITRATE: 2 POWDER TOPICAL at 16:34

## 2018-10-06 RX ADMIN — PIPERACILLIN SODIUM,TAZOBACTAM SODIUM 3.38 G: 3; .375 INJECTION, POWDER, FOR SOLUTION INTRAVENOUS at 09:51

## 2018-10-06 RX ADMIN — PIPERACILLIN SODIUM,TAZOBACTAM SODIUM 3.38 G: 3; .375 INJECTION, POWDER, FOR SOLUTION INTRAVENOUS at 22:52

## 2018-10-06 RX ADMIN — ATORVASTATIN CALCIUM 20 MG: 20 TABLET, FILM COATED ORAL at 09:52

## 2018-10-06 RX ADMIN — CLOPIDOGREL 75 MG: 75 TABLET, FILM COATED ORAL at 16:36

## 2018-10-06 RX ADMIN — PIPERACILLIN SODIUM,TAZOBACTAM SODIUM 3.38 G: 3; .375 INJECTION, POWDER, FOR SOLUTION INTRAVENOUS at 03:13

## 2018-10-06 RX ADMIN — INSULIN ASPART 1 UNITS: 100 INJECTION, SOLUTION INTRAVENOUS; SUBCUTANEOUS at 14:53

## 2018-10-06 RX ADMIN — FUROSEMIDE 40 MG: 10 INJECTION, SOLUTION INTRAVENOUS at 09:44

## 2018-10-06 RX ADMIN — PANTOPRAZOLE SODIUM 40 MG: 40 TABLET, DELAYED RELEASE ORAL at 16:34

## 2018-10-06 RX ADMIN — FERROUS SULFATE TAB 325 MG (65 MG ELEMENTAL FE) 325 MG: 325 (65 FE) TAB at 09:52

## 2018-10-06 RX ADMIN — INSULIN GLARGINE 20 UNITS: 100 INJECTION, SOLUTION SUBCUTANEOUS at 22:49

## 2018-10-06 ASSESSMENT — ACTIVITIES OF DAILY LIVING (ADL)
ADLS_ACUITY_SCORE: 17

## 2018-10-06 NOTE — PROGRESS NOTES
[facility]  Hospitalist Progress Note   10/06/2018          Assessment and Plan:       Tad Manning is a 77 year old male history of HTN, HFrEF at 20%, HLD, CAD s/p 3-vessel CABG (1995) and PCI to mLAD (3/2018), PAD s/p ileal artery stents, persistent atrial fibrillation, DM type II, CKD stage III, CLL (in remission), BPH, GERD, and anxiety admitted on 9/30/2018 for right foot pain.    Diabetic right foot infection   s/p bedside debridement (10/1)  Osteomyelitis right foot.  Peripheral vascular disease.s/p left LE bypass in 2014  Patient transferred from [facility] to [facility] for further evaluation of non-healing right foot ulcer.   , ESR 67 lactic acid 2.1, WBC 13.3  Blood culture 9/30 no growth.  MRI right foot Plantar lateral skin ulcer at the level of the fifth metatarsal head. Devitalized soft tissue thickening distal to the ulcer in the  plantar fifth toe. Nonspecific mild bone marrow edema in the fifth metatarsal head and proximal phalanx base may be reactive change alone but early osteomyelitis is not excluded. Probable postoperative changes in the soft tissues of the distal forefoot more medially.  US WALLACE - WALLACE on the left is within normal limits. WALLACE on the right would indicate moderate arterial insufficiency.     On IV vancomycin, IV Zosyn [9/30] to be continued.   Follow culture results.  Will consult infectious disease for antibiotic management.  Podiatry signed off [10/1], underwent bedside debridement.  Recommend nonweightbearing right foot.  Vascular surgery following along, recommend right below-knee amputation   Appreciate comanagement.  BKA was cancelled  on 10/4 as patient has mild pulmonary edema on chest xray and his BNP was 15k yesterday  He was on lasix 40mg PO daily PTA which has been on hold since admission  Given 2units of PRBCS transfuion on 10/3 with 40mg lasix IV inbetween which might be the cause for the pulmonary edema  Started On  lasix 40mg IV BID for diuresis on 10/4/18  Cardiology consulted  for pre op eval per vascular surgery request   Vascular surgery wants to BKA on MOnday if he remains stable   Echo ordered results pending     Acute on chronic anemia.  Unclear etiology/possible competent of dilutional  Patient has a baseline hemoglobin between 9-10.   Hemoglobin 10.2 on 9/23, dropped to 8.6 on 10/1 and further drop of 6.9 today.  Patient does not have any blood loss at this time.  Has not been on IV fluids.  Repeat hemoglobin, will check TSH, vitamin B12, folic acid.  We will check proBNP, does not appear volume overloaded.  UA for evaluation of hematuria.  Fecal occult blood test requested.  Hold heparin drip given ongoing anemia.  Given 2units of PRBCS on 10/3 for HGB of 6.9. HGb stable at 9.5 today    Uncontrolled diabetes mellitus with hyperglycemia.  HgbA1C 9.0% on admission.   Home regimen includes metformin 500 mg BID.   At the time of admissions patient's blood sugars have been fluctuating between 200-400.  Insulin Lantus 20 units at bedtime [10/1] to be continued.  Insulin NovoLog 8 units 3 times a day with meals [10/2] to be continued.  Continue medium intensity insulin sliding scale.  Recommend discontinuing metformin at the time of discharge as patient having kidney injury.  Diabetic education provided, will need to see a nutritionist and closely follow-up as outpatient.  Blood sugars are well controlled today   Persistent atrial fibrillation.  PTA Coumadin on hold  Was on IV heparin drip 9/30 -this morning heparin drip discontinued as hemoglobin dropped.  Did discuss concern for stroke while off anticoagulation with patient.    PTA was on digoxin as per recent discharge summary from [facility], unclear why it was discontinued.  Continue PTA metoprolol 100 mg oral daily  Telemetry monitoring.  HR well controlled in the 60s to 70s today   Acute on chronic systolic congestive heart failure exacerbation:  Patient does not have any  shortness of breath,  Echo 7/2018 with severely reduced EF, LVEF of 25%  --Repeat echo 10/5, 35-40%  --Net output -2095 last 24hr, -5533 since admit, diuresing well  --continue IV lasix pending today's labs (creatinine not back yet.)    CAD/ S/p 3-vessel CABG in 1995 and PCI to mLAD 3/2018  Continue PTA atorvastatin 20 mg oral daily, Plavix 75 mg oral daily.    Lasix 40mg IV BID  Cardiology consulted and are following  See echo results above    PAD / hx of ileal artery stents.  Continue PTA Plavix 75 mg oral daily, atorvastatin 20 mg oral daily.  Plavix held today by Dr. Escalante in anticipation of surgery. Restart per vascular surgery.  2nd Degree AV Block.   Noted to have abnormal conduction on telemetry 9/22 at [facility].  EKG then showed -sinus rhythm with 2nd degree AV block, appears to have been present on previous EKG back in 7/2018. His digoxin was then held, after discussion with cardiology, resumed on discharge. His primary cardiologist is Dr. Valdes. Recommend close follow up with OP cardiology, next appointment 10/22.     CKD III  Baseline had been about 1.4 (since 5/2018)  Monitor renal function periodically.  Avoid nephrotoxic drugs.    Mild pseudohyponatremia in the setting of hyperglycemia  Presented with sodium of 126 with elevated blood sugars.  Currently sodium 135     HLD  Continue PTA atorvastatin 20 mg oral daily.    Hypertension.  Continue PTA metoprolol 100 mg oral daily.  PTA Lasix on hold as above   Not on ACE inhibitor due to recent acute on chronic kidney injury.    BPH   Tamsulosin was recently discharged due to recurrent falls.  Timed voiding.      GERD  PTA ranitidine 150 mg twice daily continued.      Anxiety  Continued on his home regimen of citalopram 10 mg oral daily.  Discontinued Alphatrex recently due to high fall risk.    Physical deconditioning due to acute illness/chronic debility  Mechanical Falls.   Admitted from 9/21 - 9/24 at [facility] for acute on  "chronic kidney injury and mechanical fall/supratherapeutic INR.  He reported that his \"knees give out\".   CT head [care everywhere] negative 9/22.   Telemetry monitoring for arrhythmia.  PT and OT assessment at the time of discharge    Active Diet Order      Combination Diet 8880-1874 Calories: Moderate Consistent CHO (4-6 CHO units/meal); 2 gm NA Diet      NPO per Anesthesia Guidelines for Procedure/Surgery Except for: Meds    DVT Prophylaxis:   Code Status: Full Code  Disposition: Expected discharge in 4-5days after BKA     Patient, interdisciplinary team involved in care and agrees with plan.  Total time -  35 min. More than 50% of time spent in direct patient care, care coordination, patient counseling, and formalizing plan of care.     Yvonne Emmanuel MD        Interval History:      Chart reviewed, patient seen.  Pain is controlled, no sob, n/v, f/c.         Physical Exam:        Physical Exam   Temp:  [97.1  F (36.2  C)-98.5  F (36.9  C)] 97.1  F (36.2  C)  Heart Rate:  [68-69] 69  Resp:  [16] 16  BP: (129-138)/(60-62) 138/62  SpO2:  [98 %-99 %] 98 %    Intake/Output Summary (Last 24 hours) at 10/02/18 1529  Last data filed at 10/02/18 1100   Gross per 24 hour   Intake              240 ml   Output              750 ml   Net             -510 ml     Admission Weight: 80.5 kg (177 lb 8 oz)  Current Weight: 80.5 kg (177 lb 8 oz)    PHYSICAL EXAM  GENERAL: Patient is in no distress. Alert and oriented.  HEART: irregular rate and rhythm. S1S2.  Systolic murmur right sternal area.  LUNGS: Bilateral decreased breath sounds, no wheezing no crackles.  ABDOMEN: Soft, no abdominal tenderness, bowel sounds heard   NEURO: Moving all extremities.  No focal weakness.  EXTREMITIES: trace pedal edema.  Feeble peripheral pulses right leg.  SKIN: right foot dressing intact   Left foot with past  toe amputation       Medications:          atorvastatin  20 mg Oral Daily     cholecalciferol  1,000 Units Oral Daily     citalopram " (celeXA) tablet 10 mg  10 mg Oral Daily     ferrous sulfate  325 mg Oral Daily     furosemide  40 mg Intravenous BID     insulin aspart  8 Units Subcutaneous TID w/meals     insulin aspart  1-7 Units Subcutaneous TID AC     insulin aspart  1-5 Units Subcutaneous At Bedtime     insulin glargine  20 Units Subcutaneous At Bedtime     melatonin tablet 5 mg  5 mg Oral At Bedtime     metoprolol succinate  100 mg Oral Daily     miconazole   Topical TID     pantoprazole  40 mg Oral BID AC     piperacillin-tazobactam  3.375 g Intravenous Q6H     sodium chloride (PF)  3 mL Intracatheter Q8H     vancomycin (VANCOCIN) IV  1,750 mg Intravenous Q24H     acetaminophen, bisacodyl, glucose **OR** dextrose **OR** glucagon, naloxone, ondansetron **OR** ondansetron, oxyCODONE IR, potassium chloride, potassium chloride with lidocaine, potassium chloride, potassium chloride, potassium chloride, senna-docusate **OR** senna-docusate, sodium chloride (PF)         Data:      All new lab and imaging data was reviewed.

## 2018-10-06 NOTE — PROGRESS NOTES
Events noted.  Mr Manning had a BHAVNA to his mid LAD March 2018.  He may continue his Plavix despite the upcoming BKA.    Solis Vick MD

## 2018-10-06 NOTE — PLAN OF CARE
Problem: Patient Care Overview  Goal: Plan of Care/Patient Progress Review  Outcome: No Change  Care Plan Summary Note: Pt A&Ox4, pleasant. , VSS. CMS ex RLE numbness. Samy pain. Dressing RLE mod drainage.  Dressing changed. LLE sheen dressing changed. Coccyx dressing changed. Incontinent of bladder. IV lasix, IV ABX.   and 130.  Cardiology seen and cleared for BKA on Monday. With vascular surgery.  Mod CHO diet. IV SL. Continue to monitor.

## 2018-10-06 NOTE — PROGRESS NOTES
Vascular Surgery Progress Note    S: Slept well.  Very comfortable.  No leg pain.      O:   Vitals:  BP  Min: 129/60  Max: 137/60  Temp  Av.3  F (36.8  C)  Min: 98.2  F (36.8  C)  Max: 98.5  F (36.9  C)  I/O last 3 completed shifts:  In: 630 [P.O.:630]  Out: 2725 [Urine:2725]    Physical Exam: Alert and appropriate.  Very comfortable.                            Blood sugars have been elevated and are being treated.          Echocardiogram with ejection fraction 35-40%           Hemoglobin= 10.0   serum creatinine= 1.21         WALLACE 0.6 on the right and 1.05 on the left.        MRI= right foot osteomyelitis    Assessment/Plan: Plan right leg amputation with Dr. Vick Monday.                                   Encourage protein diet to aid in healing.                                 Oral iron for anemia.                                 Stop Plavix.                                                                       Wm. Boris MD

## 2018-10-06 NOTE — PLAN OF CARE
Problem: Patient Care Overview  Goal: Plan of Care/Patient Progress Review  Outcome: No Change  A&O x 4. VSS on room air. Pleasant and cooperative. Denied pain. Tele SR with BBB. . Baseline numbness to hands and feet. Dressings to right foot, left shin and left buttock CDI. Voided using urinal. IV SL. Continues on IV Vancomycin and IV Zosyn. Up with stand by assist, walker and gait belt. Slept well. For surgery on Monday. Will continue to monitor.

## 2018-10-07 LAB
ANION GAP SERPL CALCULATED.3IONS-SCNC: 8 MMOL/L (ref 3–14)
BACTERIA SPEC CULT: NO GROWTH
BACTERIA SPEC CULT: NO GROWTH
BUN SERPL-MCNC: 20 MG/DL (ref 7–30)
CALCIUM SERPL-MCNC: 8.8 MG/DL (ref 8.5–10.1)
CHLORIDE SERPL-SCNC: 100 MMOL/L (ref 94–109)
CO2 SERPL-SCNC: 27 MMOL/L (ref 20–32)
CREAT SERPL-MCNC: 1.35 MG/DL (ref 0.66–1.25)
ERYTHROCYTE [DISTWIDTH] IN BLOOD BY AUTOMATED COUNT: 16.1 % (ref 10–15)
GFR SERPL CREATININE-BSD FRML MDRD: 51 ML/MIN/1.7M2
GLUCOSE BLDC GLUCOMTR-MCNC: 131 MG/DL (ref 70–99)
GLUCOSE BLDC GLUCOMTR-MCNC: 150 MG/DL (ref 70–99)
GLUCOSE BLDC GLUCOMTR-MCNC: 187 MG/DL (ref 70–99)
GLUCOSE BLDC GLUCOMTR-MCNC: 227 MG/DL (ref 70–99)
GLUCOSE BLDC GLUCOMTR-MCNC: 238 MG/DL (ref 70–99)
GLUCOSE SERPL-MCNC: 232 MG/DL (ref 70–99)
HCT VFR BLD AUTO: 30.7 % (ref 40–53)
HGB BLD-MCNC: 10.2 G/DL (ref 13.3–17.7)
Lab: NORMAL
Lab: NORMAL
MCH RBC QN AUTO: 29.8 PG (ref 26.5–33)
MCHC RBC AUTO-ENTMCNC: 33.2 G/DL (ref 31.5–36.5)
MCV RBC AUTO: 90 FL (ref 78–100)
PLATELET # BLD AUTO: 154 10E9/L (ref 150–450)
POTASSIUM SERPL-SCNC: 3.7 MMOL/L (ref 3.4–5.3)
RBC # BLD AUTO: 3.42 10E12/L (ref 4.4–5.9)
SODIUM SERPL-SCNC: 135 MMOL/L (ref 133–144)
SPECIMEN SOURCE: NORMAL
SPECIMEN SOURCE: NORMAL
WBC # BLD AUTO: 6.4 10E9/L (ref 4–11)

## 2018-10-07 PROCEDURE — 25000128 H RX IP 250 OP 636: Performed by: INTERNAL MEDICINE

## 2018-10-07 PROCEDURE — A9270 NON-COVERED ITEM OR SERVICE: HCPCS | Mod: GY | Performed by: INTERNAL MEDICINE

## 2018-10-07 PROCEDURE — 25000131 ZZH RX MED GY IP 250 OP 636 PS 637: Mod: GY | Performed by: HOSPITALIST

## 2018-10-07 PROCEDURE — 25000132 ZZH RX MED GY IP 250 OP 250 PS 637: Mod: GY | Performed by: INTERNAL MEDICINE

## 2018-10-07 PROCEDURE — 12000000 ZZH R&B MED SURG/OB

## 2018-10-07 PROCEDURE — 80048 BASIC METABOLIC PNL TOTAL CA: CPT | Performed by: INTERNAL MEDICINE

## 2018-10-07 PROCEDURE — 36415 COLL VENOUS BLD VENIPUNCTURE: CPT | Performed by: INTERNAL MEDICINE

## 2018-10-07 PROCEDURE — 00000146 ZZHCL STATISTIC GLUCOSE BY METER IP

## 2018-10-07 PROCEDURE — 25000132 ZZH RX MED GY IP 250 OP 250 PS 637: Mod: GY | Performed by: SURGERY

## 2018-10-07 PROCEDURE — 85027 COMPLETE CBC AUTOMATED: CPT | Performed by: INTERNAL MEDICINE

## 2018-10-07 PROCEDURE — 99232 SBSQ HOSP IP/OBS MODERATE 35: CPT | Performed by: INTERNAL MEDICINE

## 2018-10-07 PROCEDURE — A9270 NON-COVERED ITEM OR SERVICE: HCPCS | Mod: GY | Performed by: SURGERY

## 2018-10-07 RX ORDER — CEFAZOLIN SODIUM 2 G/100ML
2 INJECTION, SOLUTION INTRAVENOUS
Status: COMPLETED | OUTPATIENT
Start: 2018-10-08 | End: 2018-10-08

## 2018-10-07 RX ORDER — CEFAZOLIN SODIUM 1 G/3ML
1 INJECTION, POWDER, FOR SOLUTION INTRAMUSCULAR; INTRAVENOUS SEE ADMIN INSTRUCTIONS
Status: DISCONTINUED | OUTPATIENT
Start: 2018-10-08 | End: 2018-10-08 | Stop reason: HOSPADM

## 2018-10-07 RX ADMIN — CITALOPRAM HYDROBROMIDE 10 MG: 10 TABLET ORAL at 08:49

## 2018-10-07 RX ADMIN — INSULIN GLARGINE 20 UNITS: 100 INJECTION, SOLUTION SUBCUTANEOUS at 21:44

## 2018-10-07 RX ADMIN — SENNOSIDES AND DOCUSATE SODIUM 2 TABLET: 8.6; 5 TABLET ORAL at 17:27

## 2018-10-07 RX ADMIN — CLOPIDOGREL 75 MG: 75 TABLET, FILM COATED ORAL at 08:49

## 2018-10-07 RX ADMIN — PANTOPRAZOLE SODIUM 40 MG: 40 TABLET, DELAYED RELEASE ORAL at 07:05

## 2018-10-07 RX ADMIN — VITAMIN D, TAB 1000IU (100/BT) 1000 UNITS: 25 TAB at 08:49

## 2018-10-07 RX ADMIN — MICONAZOLE NITRATE: 2 POWDER TOPICAL at 17:20

## 2018-10-07 RX ADMIN — VANCOMYCIN HYDROCHLORIDE 1750 MG: 5 INJECTION, POWDER, LYOPHILIZED, FOR SOLUTION INTRAVENOUS at 00:25

## 2018-10-07 RX ADMIN — INSULIN ASPART 2 UNITS: 100 INJECTION, SOLUTION INTRAVENOUS; SUBCUTANEOUS at 17:26

## 2018-10-07 RX ADMIN — FERROUS SULFATE TAB 325 MG (65 MG ELEMENTAL FE) 325 MG: 325 (65 FE) TAB at 08:49

## 2018-10-07 RX ADMIN — MICONAZOLE NITRATE: 2 POWDER TOPICAL at 20:00

## 2018-10-07 RX ADMIN — MELATONIN 5 MG TABLET 5 MG: at 21:44

## 2018-10-07 RX ADMIN — MICONAZOLE NITRATE: 2 POWDER TOPICAL at 08:49

## 2018-10-07 RX ADMIN — PANTOPRAZOLE SODIUM 40 MG: 40 TABLET, DELAYED RELEASE ORAL at 17:21

## 2018-10-07 RX ADMIN — PIPERACILLIN SODIUM,TAZOBACTAM SODIUM 3.38 G: 3; .375 INJECTION, POWDER, FOR SOLUTION INTRAVENOUS at 03:43

## 2018-10-07 RX ADMIN — INSULIN ASPART 1 UNITS: 100 INJECTION, SOLUTION INTRAVENOUS; SUBCUTANEOUS at 08:45

## 2018-10-07 RX ADMIN — FUROSEMIDE 40 MG: 10 INJECTION, SOLUTION INTRAVENOUS at 08:49

## 2018-10-07 RX ADMIN — PIPERACILLIN SODIUM,TAZOBACTAM SODIUM 3.38 G: 3; .375 INJECTION, POWDER, FOR SOLUTION INTRAVENOUS at 17:17

## 2018-10-07 RX ADMIN — PIPERACILLIN SODIUM,TAZOBACTAM SODIUM 3.38 G: 3; .375 INJECTION, POWDER, FOR SOLUTION INTRAVENOUS at 21:44

## 2018-10-07 RX ADMIN — FUROSEMIDE 40 MG: 10 INJECTION, SOLUTION INTRAVENOUS at 17:17

## 2018-10-07 RX ADMIN — METOPROLOL SUCCINATE 100 MG: 100 TABLET, EXTENDED RELEASE ORAL at 21:44

## 2018-10-07 RX ADMIN — ATORVASTATIN CALCIUM 20 MG: 20 TABLET, FILM COATED ORAL at 08:49

## 2018-10-07 RX ADMIN — INSULIN ASPART 2 UNITS: 100 INJECTION, SOLUTION INTRAVENOUS; SUBCUTANEOUS at 13:14

## 2018-10-07 RX ADMIN — PIPERACILLIN SODIUM,TAZOBACTAM SODIUM 3.38 G: 3; .375 INJECTION, POWDER, FOR SOLUTION INTRAVENOUS at 11:10

## 2018-10-07 ASSESSMENT — ACTIVITIES OF DAILY LIVING (ADL)
ADLS_ACUITY_SCORE: 18
ADLS_ACUITY_SCORE: 16
ADLS_ACUITY_SCORE: 16

## 2018-10-07 NOTE — PROGRESS NOTES
BRIEF NUTRITION ASSESSMENT      REASON FOR ASSESSMENT:  LOS    NUTRITION HISTORY:  -Pt states that he follows a regular diet with no known food allergies. Pt consumes meals three times per day     CURRENT DIET AND INTAKE:  Diet:  Moderate consistent CHO diet           Intake:   -Pt states that he has a good appetite and that he has not notice any recent weight loss   -Per RN flowhseet, pt consuming 100% of meals ordered   -Meal was in room during visit pt was wanting to eat  -Discussed oral nutrition supplements, pt has tried Ensure in the past  -Last BM 10/4 x 2   -BGM range 117-238 (MSSI + 20 units Lantus)    ANTHROPOMETRICS:  Height: 6'  Weight: 79.4kg  BMI: 23.74 kg/m2  IBW:  80.9  Weight Status: Normal BMI  %IBW: 98%  Weight History: wt loss of 12.1kg (13.2%) over the last 6 months- pt currently on lasix  Wt Readings from Last 10 Encounters:   10/07/18 79.4 kg (175 lb 0.7 oz)   09/27/18 78.5 kg (173 lb)   09/23/18 82 kg (180 lb 12.8 oz)   07/16/18 83.3 kg (183 lb 11.2 oz)   06/20/18 83.5 kg (184 lb)   05/18/18 89.8 kg (198 lb)   05/02/18 90.6 kg (199 lb 11.2 oz)   04/16/18 89.7 kg (197 lb 12.8 oz)   04/16/18 91.2 kg (201 lb)   04/09/18 91.5 kg (201 lb 11.2 oz)         LABS:  Labs noted    MALNUTRITION:  Patient does not meet two of the following criteria necessary for diagnosing malnutrition: significant weight loss, reduced intake, subcutaneous fat loss, muscle loss or fluid retention    NUTRITION INTERVENTION:  Nutrition Diagnosis:  No nutrition diagnosis at this time.    Implementation:  Nutrition Education: Provided education on oral nutrition supplements   Medical Food Supplement: Boost glucose control with meals as desires and discontinued Beneprotein        FOLLOW UP/MONITORING:   Will re-evaluate in 7 - 10 days, or sooner, if re-consulted.        Loree Andre RD, LD

## 2018-10-07 NOTE — PLAN OF CARE
Problem: Patient Care Overview  Goal: Plan of Care/Patient Progress Review  Outcome: No Change  A&O x 4. VSS on room air. Pleasant and cooperative. Denied pain. Tele SR with BBB. . Baseline numbness to hands and feet. Dried drainage to dressing to right foot. Same was changed as well as dressing to left shin. Voided using urinal. IV SL. Continues on IV Vancomycin and IV Zosyn. Up with stand by assist, walker and gait belt. Slept well. For surgery on Monday. Will continue to monitor.

## 2018-10-07 NOTE — PLAN OF CARE
Problem: Patient Care Overview  Goal: Plan of Care/Patient Progress Review  Outcome: No Change  Alert and oriented x 4. VSS on RA. Telemetry: Sinus with BBB. IV is saline locked, receiving IV antibiotics. Blood Glucose: 150/232/227. Tolerating Mod Carb diet. Denies pain. Bowel medications administered as patient has not had a BM since 10/04/2018. Uses urinal, incontinent at times. Up with one- gait belt and walker. Plan is Right BKA with Wound Vac Placement, scheduled for tomorrow, at 1550. Will continue to monitor.    Dressings changed with Milton Andre RN and DARIUSZ Savage.

## 2018-10-07 NOTE — PHARMACY-VANCOMYCIN DOSING SERVICE
Pharmacy Vancomycin Note  Date of Service 2018  Patient's  1940   77 year old, male    Indication: Osteomyelitis  Goal Trough Level: 15-20 mg/L  Day of Therapy: 8  Current Vancomycin regimen:  1750 mg IV q24h    Current estimated CrCl = Estimated Creatinine Clearance: 60.3 mL/min (based on Cr of 1.2).    Creatinine for last 3 days  10/4/2018:  8:39 AM Creatinine 1.14 mg/dL  10/5/2018:  9:12 AM Creatinine 1.21 mg/dL  10/6/2018: 10:12 AM Creatinine 1.20 mg/dL    Recent Vancomycin Levels (past 3 days)  10/6/2018: 11:25 PM Vancomycin Level 18.2 mg/L    Vancomycin IV Administrations (past 72 hours)      No vancomycin orders with administrations in past 72 hours.                Nephrotoxins and other renal medications (Future)    Start     Dose/Rate Route Frequency Ordered Stop    10/04/18 1100  furosemide (LASIX) injection 40 mg      40 mg  over 1-2 Minutes Intravenous 2 TIMES DAILY. 10/04/18 1053      18 2215  piperacillin-tazobactam (ZOSYN) 3.375 g vial to attach to  mL bag     Comments:  Pharmacy can adjust dose based on renal function.    3.375 g  over 30 Minutes Intravenous EVERY 6 HOURS 18 2204               Contrast Orders - past 72 hours (72h ago through future)    Start     Dose/Rate Route Frequency Ordered Stop    10/05/18 1345  perflutren diluted 1mL to 2mL with saline (OPTISON) diluted injection 9 mL     Comments:  NDC# 1661-1230-62    9 mL Intravenous ONCE 10/05/18 1333 10/05/18 1333          Interpretation of levels and current regimen:  Trough level is  Therapeutic    Has serum creatinine changed > 50% in last 72 hours: No    Urine output:      Renal Function: Stable    Plan:  1.  Continue Current Dose  2.  Pharmacy will check trough levels as appropriate in 1-3 Days.    3. Serum creatinine levels will be ordered daily for the first week of therapy and at least twice weekly for subsequent weeks.      Moody Perdue        .

## 2018-10-07 NOTE — PLAN OF CARE
Problem: Patient Care Overview  Goal: Plan of Care/Patient Progress Review  Outcome: No Change  [id number]: A&Ox4.  SBA.  Mod CHO, 2gm NA diet.  .  Drinking fluids.  Saline locked.  Received IV abx.  VSS on RA.  Tele: SR with BBB.  Denies pain.  Endorses neuropathy.  Wound to right foot covered.  Left shin scabbed.  Buttock dressing over superficial wound.  Encouraged off-loading and turn/repo.  Heels floated.  Refuses PCD's.  Cardiology, WOC consults.  Due for right foot amputation on Monday.

## 2018-10-07 NOTE — PROGRESS NOTES
[facility]    Hospitalist Progress Note    Assessment & Plan   Tad Manning is a 77 year old male who was admitted on 9/30/2018 with:    Impression:   Principal Problem:    Osteomyelitis Right Foot  Active Problems:    DM type 2 w neuropathy -- Hgb A1C 9.0 on 9/22/18    HTN (hypertension)    CAD -- S/P CABG    Osteomyelitis left 2nd Toe s/p amputaion 1/10/14    CRF (chronic renal failure), stage 3 (moderate) (H)    Chronic systolic heart failure (H)    Atrial fibrillation -- on Warfarin    Hyponatremia      Plan:  Continue antibiotics, right BKA planned tomorrow AM, NPO after MN.     DVT Prophylaxis: Pneumatic Compression Devices  Code Status: Full Code    Disposition: Expected discharge in 4-5 days once recovers from BKA, will need TCU (Thurs or Friday).    Mikael Thomas MD  Pager [phone]  Cell Phone [phone]  Text Page (7am to 6pm)    Interval History   Feels OK, no pain in right foot (has neuropathy).     Physical Exam   Temp: 98.4  F (36.9  C) Temp src: Oral BP: 130/58   Heart Rate: 64 Resp: 16 SpO2: 94 % O2 Device: None (Room air)    Vitals:    10/05/18 0607 10/06/18 0514 10/07/18 0310   Weight: 84.4 kg (186 lb 1.6 oz) 80.2 kg (176 lb 12.8 oz) 79.4 kg (175 lb 0.7 oz)     Vital Signs with Ranges  Temp:  [98  F (36.7  C)-99.1  F (37.3  C)] 98.4  F (36.9  C)  Heart Rate:  [64-74] 64  Resp:  [16] 16  BP: (109-133)/(52-64) 130/58  SpO2:  [94 %-99 %] 94 %  I/O last 3 completed shifts:  In: 200 [P.O.:200]  Out: 1375 [Urine:1375]    # Pain Assessment:  Current Pain Score 10/7/2018   Patient currently in pain? denies   Pain score (0-10) 0   Pain location -   Pain descriptors -   Tad cooper pain level was assessed and he currently denies pain.        Constitutional: Awake, alert, cooperative, no apparent distress  Respiratory: Clear to auscultation bilaterally, no crackles or wheezing  Cardiovascular: Regular rate and rhythm, normal S1 and S2, and no murmur noted  GI: Normal  bowel sounds, soft, non-distended, non-tender  Extrem: No calf tenderness, trace bilateral ankle edema  Neuro: Ox3, no focal motor deficits, feet numb to level of just above ankle    Medications       atorvastatin  20 mg Oral Daily     cholecalciferol  1,000 Units Oral Daily     citalopram (celeXA) tablet 10 mg  10 mg Oral Daily     clopidogrel  75 mg Oral Daily     ferrous sulfate  325 mg Oral Daily     furosemide  40 mg Intravenous BID     insulin aspart  8 Units Subcutaneous TID w/meals     insulin aspart  1-7 Units Subcutaneous TID AC     insulin aspart  1-5 Units Subcutaneous At Bedtime     insulin glargine  20 Units Subcutaneous At Bedtime     melatonin tablet 5 mg  5 mg Oral At Bedtime     metoprolol succinate  100 mg Oral Daily     miconazole   Topical TID     pantoprazole  40 mg Oral BID AC     piperacillin-tazobactam  3.375 g Intravenous Q6H     sodium chloride (PF)  3 mL Intracatheter Q8H     vancomycin (VANCOCIN) IV  1,750 mg Intravenous Q24H       Data     Recent Labs  Lab 10/06/18  2325 10/06/18  1012 10/05/18  0912 10/04/18  0839  10/03/18  0636  10/02/18  0626 10/01/18  2211   WBC  --  5.8 7.6 6.3  --  6.6  --   --   --    HGB  --  9.7* 10.1* 9.3*  < > 6.9*  --   --   --    MCV  --  88 88 87  --  90  --   --   --    PLT  --  132* 130* 103*  --  107*  --   --   --    INR  --   --   --  1.37*  --   --   --  1.44* 1.71*   NA  --  136 137 136  --  135  < >  --   --    POTASSIUM 3.7 3.3* 3.5 3.6  --  3.2*  --   --   --    CHLORIDE  --  103 103 104  --  103  --   --   --    CO2  --  25 24 22  --  22  --   --   --    BUN  --  19 16 17  --  30  --   --   --    CR  --  1.20 1.21 1.14  --  1.28*  --  1.50*  --    ANIONGAP  --  8 10 10  --  10  --   --   --    JUSTINO  --  8.2* 8.8 7.9*  --  8.0*  --   --   --    GLC  --  170* 161* 159*  --  135*  --   --   --    ALBUMIN  --   --   --   --   --  2.0*  --   --   --    PROTTOTAL  --   --   --   --   --  5.5*  --   --   --    BILITOTAL  --   --   --   --   --  0.6  --    --   --    ALKPHOS  --   --   --   --   --  73  --   --   --    ALT  --   --   --   --   --  14  --   --   --    AST  --   --   --   --   --  15  --   --   --    < > = values in this interval not displayed.    Imaging:   No results found for this or any previous visit (from the past 24 hour(s)).

## 2018-10-07 NOTE — PLAN OF CARE
Problem: Gastrointestinal Condition Comorbidity  Goal: Gastrointestinal Condition  Patient comorbidity will be monitored for signs and symptoms of Gastrointestinal condition.  Problems will be absent, minimized or managed by discharge/transition of care.   Outcome: Declining  Reports constipation, would like Senna in AM.

## 2018-10-07 NOTE — PLAN OF CARE
Problem: Patient Care Overview  Goal: Plan of Care/Patient Progress Review  Outcome: No Change  Alert and oriented x 4. VSS on RA. Samy pain. Uses urinal. Receiving IV antibiotics and IV lasix. IV is saline locked. Plan is for BKA on Monday. Plavix restarted per Dr. Vick. Will continue to monitor.

## 2018-10-08 ENCOUNTER — APPOINTMENT (OUTPATIENT)
Dept: SURGERY | Facility: PHYSICIAN GROUP | Age: 78
End: 2018-10-08
Payer: MEDICARE

## 2018-10-08 ENCOUNTER — ANESTHESIA EVENT (OUTPATIENT)
Dept: SURGERY | Facility: CLINIC | Age: 78
DRG: 854 | End: 2018-10-08
Payer: MEDICARE

## 2018-10-08 ENCOUNTER — ANESTHESIA (OUTPATIENT)
Dept: SURGERY | Facility: CLINIC | Age: 78
DRG: 854 | End: 2018-10-08
Payer: MEDICARE

## 2018-10-08 LAB
ABO + RH BLD: NORMAL
ABO + RH BLD: NORMAL
ANION GAP SERPL CALCULATED.3IONS-SCNC: 7 MMOL/L (ref 3–14)
BLD GP AB SCN SERPL QL: NORMAL
BLOOD BANK CMNT PATIENT-IMP: NORMAL
BUN SERPL-MCNC: 19 MG/DL (ref 7–30)
CALCIUM SERPL-MCNC: 8.7 MG/DL (ref 8.5–10.1)
CHLORIDE SERPL-SCNC: 103 MMOL/L (ref 94–109)
CO2 SERPL-SCNC: 26 MMOL/L (ref 20–32)
CREAT SERPL-MCNC: 1.21 MG/DL (ref 0.66–1.25)
ERYTHROCYTE [DISTWIDTH] IN BLOOD BY AUTOMATED COUNT: 15.9 % (ref 10–15)
GFR SERPL CREATININE-BSD FRML MDRD: 58 ML/MIN/1.7M2
GLUCOSE BLDC GLUCOMTR-MCNC: 147 MG/DL (ref 70–99)
GLUCOSE BLDC GLUCOMTR-MCNC: 156 MG/DL (ref 70–99)
GLUCOSE BLDC GLUCOMTR-MCNC: 165 MG/DL (ref 70–99)
GLUCOSE BLDC GLUCOMTR-MCNC: 173 MG/DL (ref 70–99)
GLUCOSE BLDC GLUCOMTR-MCNC: 183 MG/DL (ref 70–99)
GLUCOSE BLDC GLUCOMTR-MCNC: 229 MG/DL (ref 70–99)
GLUCOSE SERPL-MCNC: 171 MG/DL (ref 70–99)
HCT VFR BLD AUTO: 28.5 % (ref 40–53)
HGB BLD-MCNC: 9.5 G/DL (ref 13.3–17.7)
INR PPP: 1.2 (ref 0.86–1.14)
MCH RBC QN AUTO: 29.6 PG (ref 26.5–33)
MCHC RBC AUTO-ENTMCNC: 33.3 G/DL (ref 31.5–36.5)
MCV RBC AUTO: 89 FL (ref 78–100)
NT-PROBNP SERPL-MCNC: 5223 PG/ML (ref 0–1800)
PLATELET # BLD AUTO: 133 10E9/L (ref 150–450)
POTASSIUM SERPL-SCNC: 3.5 MMOL/L (ref 3.4–5.3)
RBC # BLD AUTO: 3.21 10E12/L (ref 4.4–5.9)
SODIUM SERPL-SCNC: 136 MMOL/L (ref 133–144)
SPECIMEN EXP DATE BLD: NORMAL
WBC # BLD AUTO: 4.5 10E9/L (ref 4–11)

## 2018-10-08 PROCEDURE — 25000132 ZZH RX MED GY IP 250 OP 250 PS 637: Mod: GY | Performed by: INTERNAL MEDICINE

## 2018-10-08 PROCEDURE — 80048 BASIC METABOLIC PNL TOTAL CA: CPT | Performed by: SURGERY

## 2018-10-08 PROCEDURE — 25000128 H RX IP 250 OP 636

## 2018-10-08 PROCEDURE — 25000128 H RX IP 250 OP 636: Performed by: ANESTHESIOLOGY

## 2018-10-08 PROCEDURE — 25000128 H RX IP 250 OP 636: Performed by: SURGERY

## 2018-10-08 PROCEDURE — 00000146 ZZHCL STATISTIC GLUCOSE BY METER IP

## 2018-10-08 PROCEDURE — 99232 SBSQ HOSP IP/OBS MODERATE 35: CPT | Performed by: INTERNAL MEDICINE

## 2018-10-08 PROCEDURE — 25000132 ZZH RX MED GY IP 250 OP 250 PS 637: Mod: GY | Performed by: SURGERY

## 2018-10-08 PROCEDURE — A9270 NON-COVERED ITEM OR SERVICE: HCPCS | Mod: GY | Performed by: INTERNAL MEDICINE

## 2018-10-08 PROCEDURE — A9270 NON-COVERED ITEM OR SERVICE: HCPCS | Mod: GY | Performed by: SURGERY

## 2018-10-08 PROCEDURE — 12000007 ZZH R&B INTERMEDIATE

## 2018-10-08 PROCEDURE — 36000056 ZZH SURGERY LEVEL 3 1ST 30 MIN: Performed by: SURGERY

## 2018-10-08 PROCEDURE — 25000131 ZZH RX MED GY IP 250 OP 636 PS 637: Mod: GY | Performed by: HOSPITALIST

## 2018-10-08 PROCEDURE — 71000012 ZZH RECOVERY PHASE 1 LEVEL 1 FIRST HR: Performed by: SURGERY

## 2018-10-08 PROCEDURE — 25000566 ZZH SEVOFLURANE, EA 15 MIN: Performed by: SURGERY

## 2018-10-08 PROCEDURE — 40000171 ZZH STATISTIC PRE-PROCEDURE ASSESSMENT III: Performed by: SURGERY

## 2018-10-08 PROCEDURE — 86901 BLOOD TYPING SEROLOGIC RH(D): CPT | Performed by: SURGERY

## 2018-10-08 PROCEDURE — A9270 NON-COVERED ITEM OR SERVICE: HCPCS | Mod: GY | Performed by: ANESTHESIOLOGY

## 2018-10-08 PROCEDURE — 25000125 ZZHC RX 250

## 2018-10-08 PROCEDURE — 37000008 ZZH ANESTHESIA TECHNICAL FEE, 1ST 30 MIN: Performed by: SURGERY

## 2018-10-08 PROCEDURE — 27882 AMPUTATION OF LOWER LEG: CPT | Mod: RT | Performed by: SURGERY

## 2018-10-08 PROCEDURE — 25000125 ZZHC RX 250: Performed by: SURGERY

## 2018-10-08 PROCEDURE — 36415 COLL VENOUS BLD VENIPUNCTURE: CPT | Performed by: SURGERY

## 2018-10-08 PROCEDURE — 25000128 H RX IP 250 OP 636: Performed by: INTERNAL MEDICINE

## 2018-10-08 PROCEDURE — 36000058 ZZH SURGERY LEVEL 3 EA 15 ADDTL MIN: Performed by: SURGERY

## 2018-10-08 PROCEDURE — 85027 COMPLETE CBC AUTOMATED: CPT | Performed by: SURGERY

## 2018-10-08 PROCEDURE — 83880 ASSAY OF NATRIURETIC PEPTIDE: CPT | Performed by: SURGERY

## 2018-10-08 PROCEDURE — 88300 SURGICAL PATH GROSS: CPT | Mod: 26 | Performed by: SURGERY

## 2018-10-08 PROCEDURE — 85610 PROTHROMBIN TIME: CPT | Performed by: SURGERY

## 2018-10-08 PROCEDURE — 27210794 ZZH OR GENERAL SUPPLY STERILE: Performed by: SURGERY

## 2018-10-08 PROCEDURE — 25000132 ZZH RX MED GY IP 250 OP 250 PS 637: Mod: GY | Performed by: ANESTHESIOLOGY

## 2018-10-08 PROCEDURE — 86900 BLOOD TYPING SEROLOGIC ABO: CPT | Performed by: SURGERY

## 2018-10-08 PROCEDURE — 86850 RBC ANTIBODY SCREEN: CPT | Performed by: SURGERY

## 2018-10-08 PROCEDURE — 0Y6H0Z3 DETACHMENT AT RIGHT LOWER LEG, LOW, OPEN APPROACH: ICD-10-PCS | Performed by: SURGERY

## 2018-10-08 PROCEDURE — 88300 SURGICAL PATH GROSS: CPT | Performed by: SURGERY

## 2018-10-08 PROCEDURE — 37000009 ZZH ANESTHESIA TECHNICAL FEE, EACH ADDTL 15 MIN: Performed by: SURGERY

## 2018-10-08 PROCEDURE — 25000131 ZZH RX MED GY IP 250 OP 636 PS 637: Mod: GY | Performed by: INTERNAL MEDICINE

## 2018-10-08 RX ORDER — LIDOCAINE HYDROCHLORIDE 20 MG/ML
INJECTION, SOLUTION INFILTRATION; PERINEURAL PRN
Status: DISCONTINUED | OUTPATIENT
Start: 2018-10-08 | End: 2018-10-08

## 2018-10-08 RX ORDER — FENTANYL CITRATE 50 UG/ML
25-50 INJECTION, SOLUTION INTRAMUSCULAR; INTRAVENOUS
Status: DISCONTINUED | OUTPATIENT
Start: 2018-10-08 | End: 2018-10-08 | Stop reason: HOSPADM

## 2018-10-08 RX ORDER — NALOXONE HYDROCHLORIDE 0.4 MG/ML
.1-.4 INJECTION, SOLUTION INTRAMUSCULAR; INTRAVENOUS; SUBCUTANEOUS
Status: DISCONTINUED | OUTPATIENT
Start: 2018-10-08 | End: 2018-10-08

## 2018-10-08 RX ORDER — SODIUM CHLORIDE, SODIUM LACTATE, POTASSIUM CHLORIDE, CALCIUM CHLORIDE 600; 310; 30; 20 MG/100ML; MG/100ML; MG/100ML; MG/100ML
INJECTION, SOLUTION INTRAVENOUS CONTINUOUS PRN
Status: DISCONTINUED | OUTPATIENT
Start: 2018-10-08 | End: 2018-10-08

## 2018-10-08 RX ORDER — ACETAMINOPHEN 500 MG
1000 TABLET ORAL ONCE
Status: COMPLETED | OUTPATIENT
Start: 2018-10-08 | End: 2018-10-08

## 2018-10-08 RX ORDER — ONDANSETRON 4 MG/1
4 TABLET, ORALLY DISINTEGRATING ORAL EVERY 30 MIN PRN
Status: DISCONTINUED | OUTPATIENT
Start: 2018-10-08 | End: 2018-10-08 | Stop reason: HOSPADM

## 2018-10-08 RX ORDER — PROPOFOL 10 MG/ML
INJECTION, EMULSION INTRAVENOUS PRN
Status: DISCONTINUED | OUTPATIENT
Start: 2018-10-08 | End: 2018-10-08

## 2018-10-08 RX ORDER — HYDROMORPHONE HYDROCHLORIDE 1 MG/ML
.3-.5 INJECTION, SOLUTION INTRAMUSCULAR; INTRAVENOUS; SUBCUTANEOUS EVERY 5 MIN PRN
Status: DISCONTINUED | OUTPATIENT
Start: 2018-10-08 | End: 2018-10-08 | Stop reason: HOSPADM

## 2018-10-08 RX ORDER — SODIUM CHLORIDE, SODIUM LACTATE, POTASSIUM CHLORIDE, CALCIUM CHLORIDE 600; 310; 30; 20 MG/100ML; MG/100ML; MG/100ML; MG/100ML
INJECTION, SOLUTION INTRAVENOUS CONTINUOUS
Status: DISCONTINUED | OUTPATIENT
Start: 2018-10-08 | End: 2018-10-08 | Stop reason: HOSPADM

## 2018-10-08 RX ORDER — FENTANYL CITRATE 50 UG/ML
25-100 INJECTION, SOLUTION INTRAMUSCULAR; INTRAVENOUS
Status: COMPLETED | OUTPATIENT
Start: 2018-10-08 | End: 2018-10-08

## 2018-10-08 RX ORDER — MAGNESIUM HYDROXIDE 1200 MG/15ML
LIQUID ORAL PRN
Status: DISCONTINUED | OUTPATIENT
Start: 2018-10-08 | End: 2018-10-08 | Stop reason: HOSPADM

## 2018-10-08 RX ORDER — EPHEDRINE SULFATE 50 MG/ML
INJECTION, SOLUTION INTRAMUSCULAR; INTRAVENOUS; SUBCUTANEOUS PRN
Status: DISCONTINUED | OUTPATIENT
Start: 2018-10-08 | End: 2018-10-08

## 2018-10-08 RX ORDER — ONDANSETRON 2 MG/ML
INJECTION INTRAMUSCULAR; INTRAVENOUS PRN
Status: DISCONTINUED | OUTPATIENT
Start: 2018-10-08 | End: 2018-10-08

## 2018-10-08 RX ORDER — ONDANSETRON 2 MG/ML
4 INJECTION INTRAMUSCULAR; INTRAVENOUS EVERY 30 MIN PRN
Status: DISCONTINUED | OUTPATIENT
Start: 2018-10-08 | End: 2018-10-08 | Stop reason: HOSPADM

## 2018-10-08 RX ORDER — HYDROMORPHONE HYDROCHLORIDE 1 MG/ML
.2-.4 INJECTION, SOLUTION INTRAMUSCULAR; INTRAVENOUS; SUBCUTANEOUS EVERY 4 HOURS PRN
Status: DISCONTINUED | OUTPATIENT
Start: 2018-10-08 | End: 2018-10-16 | Stop reason: HOSPADM

## 2018-10-08 RX ADMIN — PROPOFOL 120 MG: 10 INJECTION, EMULSION INTRAVENOUS at 14:53

## 2018-10-08 RX ADMIN — FENTANYL CITRATE 50 MCG: 50 INJECTION, SOLUTION INTRAMUSCULAR; INTRAVENOUS at 14:34

## 2018-10-08 RX ADMIN — INSULIN ASPART 1 UNITS: 100 INJECTION, SOLUTION INTRAVENOUS; SUBCUTANEOUS at 23:03

## 2018-10-08 RX ADMIN — MICONAZOLE NITRATE: 2 POWDER TOPICAL at 10:00

## 2018-10-08 RX ADMIN — PANTOPRAZOLE SODIUM 40 MG: 40 TABLET, DELAYED RELEASE ORAL at 06:58

## 2018-10-08 RX ADMIN — VANCOMYCIN HYDROCHLORIDE 1750 MG: 5 INJECTION, POWDER, LYOPHILIZED, FOR SOLUTION INTRAVENOUS at 00:04

## 2018-10-08 RX ADMIN — PIPERACILLIN SODIUM,TAZOBACTAM SODIUM 3.38 G: 3; .375 INJECTION, POWDER, FOR SOLUTION INTRAVENOUS at 09:51

## 2018-10-08 RX ADMIN — METOPROLOL SUCCINATE 100 MG: 100 TABLET, EXTENDED RELEASE ORAL at 21:53

## 2018-10-08 RX ADMIN — PHENYLEPHRINE HYDROCHLORIDE 50 MCG: 10 INJECTION, SOLUTION INTRAMUSCULAR; INTRAVENOUS; SUBCUTANEOUS at 14:53

## 2018-10-08 RX ADMIN — ACETAMINOPHEN 1000 MG: 500 TABLET, FILM COATED ORAL at 14:07

## 2018-10-08 RX ADMIN — INSULIN GLARGINE 20 UNITS: 100 INJECTION, SOLUTION SUBCUTANEOUS at 21:53

## 2018-10-08 RX ADMIN — PIPERACILLIN SODIUM,TAZOBACTAM SODIUM 3.38 G: 3; .375 INJECTION, POWDER, FOR SOLUTION INTRAVENOUS at 03:58

## 2018-10-08 RX ADMIN — PROPOFOL 50 MG: 10 INJECTION, EMULSION INTRAVENOUS at 15:12

## 2018-10-08 RX ADMIN — CEFAZOLIN SODIUM 2 G: 2 INJECTION, SOLUTION INTRAVENOUS at 14:53

## 2018-10-08 RX ADMIN — MELATONIN 5 MG TABLET 5 MG: at 21:53

## 2018-10-08 RX ADMIN — FERROUS SULFATE TAB 325 MG (65 MG ELEMENTAL FE) 325 MG: 325 (65 FE) TAB at 09:52

## 2018-10-08 RX ADMIN — CLOPIDOGREL 75 MG: 75 TABLET, FILM COATED ORAL at 09:53

## 2018-10-08 RX ADMIN — VITAMIN D, TAB 1000IU (100/BT) 1000 UNITS: 25 TAB at 09:52

## 2018-10-08 RX ADMIN — MIDAZOLAM HYDROCHLORIDE 1 MG: 1 INJECTION, SOLUTION INTRAMUSCULAR; INTRAVENOUS at 14:34

## 2018-10-08 RX ADMIN — ATORVASTATIN CALCIUM 20 MG: 20 TABLET, FILM COATED ORAL at 09:52

## 2018-10-08 RX ADMIN — FUROSEMIDE 40 MG: 10 INJECTION, SOLUTION INTRAVENOUS at 09:52

## 2018-10-08 RX ADMIN — Medication 5 MG: at 15:29

## 2018-10-08 RX ADMIN — PIPERACILLIN SODIUM,TAZOBACTAM SODIUM 3.38 G: 3; .375 INJECTION, POWDER, FOR SOLUTION INTRAVENOUS at 21:53

## 2018-10-08 RX ADMIN — PHENYLEPHRINE HYDROCHLORIDE 100 MCG: 10 INJECTION, SOLUTION INTRAMUSCULAR; INTRAVENOUS; SUBCUTANEOUS at 15:31

## 2018-10-08 RX ADMIN — HYDROMORPHONE HYDROCHLORIDE 0.5 MG: 1 INJECTION, SOLUTION INTRAMUSCULAR; INTRAVENOUS; SUBCUTANEOUS at 16:43

## 2018-10-08 RX ADMIN — ONDANSETRON 4 MG: 2 INJECTION INTRAMUSCULAR; INTRAVENOUS at 15:41

## 2018-10-08 RX ADMIN — LIDOCAINE HYDROCHLORIDE 100 MG: 20 INJECTION, SOLUTION INFILTRATION; PERINEURAL at 14:51

## 2018-10-08 RX ADMIN — SODIUM CHLORIDE, POTASSIUM CHLORIDE, SODIUM LACTATE AND CALCIUM CHLORIDE: 600; 310; 30; 20 INJECTION, SOLUTION INTRAVENOUS at 14:47

## 2018-10-08 RX ADMIN — CITALOPRAM HYDROBROMIDE 10 MG: 10 TABLET ORAL at 09:52

## 2018-10-08 ASSESSMENT — ACTIVITIES OF DAILY LIVING (ADL)
ADLS_ACUITY_SCORE: 17

## 2018-10-08 ASSESSMENT — LIFESTYLE VARIABLES: TOBACCO_USE: 1

## 2018-10-08 ASSESSMENT — ENCOUNTER SYMPTOMS: DYSRHYTHMIAS: 1

## 2018-10-08 NOTE — ANESTHESIA PREPROCEDURE EVALUATION
Procedure: Procedure(s):  AMPUTATE LEG BELOW KNEE  APPLY WOUND VAC  Preop diagnosis: DIABETIC RIGHT FOOR WITH OSTEOMYLETIS    Allergies   Allergen Reactions     Blood Transfusion Related (Informational Only) Other (See Comments)     Patient has a history of a clinically significant antibody against RBC antigens.  A delay in compatible RBCs may occur.        Past Medical History:   Diagnosis Date     Arthritis      ASCVD (arteriosclerotic cardiovascular disease)      BMI 30.0-30.9,adult      BPH (benign prostatic hypertrophy)      Cellulitis      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)      Coronary artery disease     triple bypass 1995     Diabetes mellitus (H)      Diabetic polyneuropathy (H)      History of blood transfusion      Hyperlipidaemia      Hypertension      Noninflammatory pericardial effusion      Osteomyelitis of left foot (H)      PVD (peripheral vascular disease) (H)      Sebaceous cyst        Past Surgical History:   Procedure Laterality Date     AMPUTATE TOE(S)  1/10/2014    Procedure: AMPUTATE TOE(S);;  Surgeon: Amador Vick MD;  Location:  OR     BONE MARROW BIOPSY, BONE SPECIMEN, NEEDLE/TROCAR  10/19/2012    Procedure: BIOPSY BONE MARROW;  BONE MARROW BIOPSY  (CONSCIOUS SED);  Surgeon: Ramon Quesada MD;  Location:  GI     BYPASS GRAFT FEMOROPOPLITEAL  1/10/2014    Procedure: BYPASS GRAFT FEMOROPOPLITEAL;  Left above knee popliteal to  Below knee popliteal bypass using transverse saphenous vein graft. Left 2nd Toe amputation;  Surgeon: Amador Vick MD;  Location:  OR     CARDIAC SURGERY      angioplasty with stent, triple bypass     EXCISE CYST GENERIC (LOCATION) N/A 2/1/2016    Procedure: EXCISE CYST GENERIC (LOCATION);  Surgeon: Amador Vick MD;  Location:  SD     GRAFT VEIN FROM EXTREMITY (LOCATION)  1/10/2014    Procedure: GRAFT VEIN FROM EXTREMITY (LOCATION);;  Surgeon: Amador Vick MD;  Location:  OR      LAMINECTOMY THORACIC ONE LEVEL N/A 2/8/2017    Procedure: LAMINECTOMY THORACIC ONE LEVEL;  Surgeon: Marcelo Trent MD;  Location: [facility] OR     OPTICAL TRACKING SYSTEM FUSION POSTERIOR SPINE THORACIC THREE+ LEVELS N/A 2/12/2017    Procedure: OPTICAL TRACKING SYSTEM FUSION POSTERIOR SPINE THORACIC THREE+ LEVELS;  Surgeon: Marcelo Trent MD;  Location: [facility] OR     TONSILLECTOMY       VASCULAR SURGERY      ptcr legs       Social History   Substance Use Topics     Smoking status: Former Smoker     Packs/day: 2.00     Years: 30.00     Types: Cigarettes     Quit date: 6/19/1995     Smokeless tobacco: Never Used     Alcohol use No       Prior to Admission medications    Medication Sig Start Date End Date Taking? Authorizing Provider   acetaminophen (TYLENOL) 325 MG tablet Take 325 mg by mouth every 4 hours as needed for mild pain   Yes Reported, Patient   atorvastatin (LIPITOR) 20 MG tablet TAKE 1 TABLET BY MOUTH EVERY DAY 4/9/18  Yes Clarisse Valdes MD   cholecalciferol 1000 UNITS TABS Take 1,000 Units by mouth daily 4/8/17  Yes Derek Navas DDS   Citalopram Hydrobromide (CELEXA PO) Take 10 mg by mouth daily   Yes Reported, Patient   clopidogrel (PLAVIX) 75 MG tablet Take 1 tablet (75 mg) by mouth daily 3/7/18  Yes Russell Gaines MD   furosemide (LASIX) 20 MG tablet Take 2 tablets (40 mg) by mouth daily 9/27/18  Yes Jess Chi, NP   hydrocortisone (CORTIZONE-10) 1 % ointment Apply topically 2 times daily as needed for itching   Yes Unknown, Entered By History   MELATONIN PO Take 5 mg by mouth daily   Yes Reported, Patient   metFORMIN (GLUCOPHAGE) 500 MG tablet Take 1 tablet (500 mg) by mouth 2 times daily (with meals) 4/8/17  Yes Derek Navas DDS   metoprolol succinate (TOPROL-XL) 200 MG 24 hr tablet Take 0.5 tablets (100 mg) by mouth daily 5/18/18  Yes Rasheeda Stephen PA-C   nitroglycerin (NITROSTAT) 0.4 MG sublingual tablet For chest pain place 1 tablet under the tongue every 5 minutes  for 3 doses. If symptoms persist 5 minutes after 1st dose call 911. 4/8/17  Yes Derek Navas, DDS   nystatin (MYCOSTATIN) cream Apply topically daily as needed    Yes Reported, Patient   ranitidine (ZANTAC) 150 MG tablet Take 1 tablet (150 mg) by mouth 2 times daily  Patient taking differently: Take 150 mg by mouth 2 times daily as needed for heartburn  4/8/17  Yes Derek Navas, DDS   sodium chloride (EQ SALINE NASAL SPRAY) 0.65 % nasal spray Spray 1 spray into both nostrils daily as needed for congestion 4/9/18  Yes Clarisse Valdes MD   warfarin (COUMADIN) 5 MG tablet Take 0.5 tablets tonight 9/24 and 1 tab tomorrow night 9/25. INR check on Wednesday 9/26 and continue as directed by coumadin clinic. 9/24/18  Yes Fatou Maldonado PA   order for DME Equipment being ordered: DH2 shoe 7/21/16   Enzo Alonso DPM     Current Facility-Administered Medications Ordered in Epic   Medication Dose Route Frequency Last Rate Last Dose     acetaminophen (TYLENOL) tablet 650 mg  650 mg Oral Q4H PRN   650 mg at 10/03/18 0023     atorvastatin (LIPITOR) tablet 20 mg  20 mg Oral Daily   20 mg at 10/08/18 0952     bisacodyl (DULCOLAX) Suppository 10 mg  10 mg Rectal Daily PRN   10 mg at 10/03/18 1333     ceFAZolin (ANCEF) 1 g vial to attach to  ml bag for ADULT or 50 ml bag for PEDS  1 g Intravenous See Admin Instructions         ceFAZolin (ANCEF) intermittent infusion 2 g in 100 mL dextrose PRE-MIX  2 g Intravenous Pre-Op/Pre-procedure x 1 dose         cholecalciferol (vitamin D3) tablet 1,000 Units  1,000 Units Oral Daily   1,000 Units at 10/08/18 0952     citalopram (celeXA) tablet 10 mg  10 mg Oral Daily   10 mg at 10/08/18 0952     clopidogrel (PLAVIX) tablet 75 mg  75 mg Oral Daily   75 mg at 10/08/18 0953     glucose gel 15-30 g  15-30 g Oral Q15 Min PRN        Or     dextrose 50 % injection 25-50 mL  25-50 mL Intravenous Q15 Min PRN        Or     glucagon injection 1 mg  1 mg Subcutaneous Q15 Min PRN          ferrous sulfate (IRON) tablet 325 mg  325 mg Oral Daily   325 mg at 10/08/18 0952     furosemide (LASIX) injection 40 mg  40 mg Intravenous BID   40 mg at 10/08/18 0952     insulin aspart (NovoLOG) inj (RAPID ACTING)  8 Units Subcutaneous TID w/meals   8 Units at 10/07/18 1727     insulin aspart (NovoLOG) inj (RAPID ACTING)  1-7 Units Subcutaneous TID AC   2 Units at 10/07/18 1726     insulin aspart (NovoLOG) inj (RAPID ACTING)  1-5 Units Subcutaneous At Bedtime   1 Units at 10/04/18 2125     insulin glargine (LANTUS) injection 20 Units  20 Units Subcutaneous At Bedtime   20 Units at 10/07/18 2144     lactated ringers infusion   Intravenous Continuous         lactated ringers infusion   Intravenous Continuous         lidocaine 1 % 1 mL  1 mL Other Q1H PRN         lidocaine 1 % 1 mL  1 mL Other Q1H PRN         melatonin tablet 5 mg  5 mg Oral At Bedtime   5 mg at 10/07/18 2144     metoprolol succinate (TOPROL-XL) 24 hr tablet 100 mg  100 mg Oral Daily   100 mg at 10/07/18 2144     miconazole (MICATIN; MICRO GUARD) 2 % powder   Topical TID         naloxone (NARCAN) injection 0.1-0.4 mg  0.1-0.4 mg Intravenous Q2 Min PRN         ondansetron (ZOFRAN-ODT) ODT tab 4 mg  4 mg Oral Q6H PRN        Or     ondansetron (ZOFRAN) injection 4 mg  4 mg Intravenous Q6H PRN         oxyCODONE IR (ROXICODONE) tablet 5-10 mg  5-10 mg Oral Q3H PRN         pantoprazole (PROTONIX) EC tablet 40 mg  40 mg Oral BID AC   40 mg at 10/08/18 0658     piperacillin-tazobactam (ZOSYN) 3.375 g vial to attach to  mL bag  3.375 g Intravenous Q6H 200 mL/hr at 10/08/18 0358 3.375 g at 10/08/18 0951     potassium chloride (KLOR-CON) Packet 20-40 mEq  20-40 mEq Oral or Feeding Tube Q2H PRN         potassium chloride 10 mEq in 100 mL intermittent infusion with 10 mg lidocaine  10 mEq Intravenous Q1H PRN         potassium chloride 10 mEq in 100 mL sterile water intermittent infusion (premix)  10 mEq Intravenous Q1H PRN         potassium chloride 20 mEq  in 50 mL intermittent infusion  20 mEq Intravenous Q1H PRN         potassium chloride SA (K-DUR/KLOR-CON M) CR tablet 20-40 mEq  20-40 mEq Oral Q2H PRN   20 mEq at 10/06/18 1834     senna-docusate (SENOKOT-S;PERICOLACE) 8.6-50 MG per tablet 1 tablet  1 tablet Oral BID PRN   1 tablet at 10/03/18 0930    Or     senna-docusate (SENOKOT-S;PERICOLACE) 8.6-50 MG per tablet 2 tablet  2 tablet Oral BID PRN   2 tablet at 10/07/18 1727     sodium chloride (PF) 0.9% PF flush 3 mL  3 mL Intracatheter Q1H PRN   3 mL at 10/07/18 0031     sodium chloride (PF) 0.9% PF flush 3 mL  3 mL Intracatheter Q8H   3 mL at 10/08/18 1245     vancomycin (VANCOCIN) 1,750 mg in sodium chloride 0.9 % 500 mL intermittent infusion  1,750 mg Intravenous Q24H 200 mL/hr at 10/08/18 0004 1,750 mg at 10/08/18 0004     No current [facility]-ordered outpatient prescriptions on file.       lactated ringers       lactated ringers         Wt Readings from Last 1 Encounters:   10/07/18 79.4 kg (175 lb 0.7 oz)     Temp Readings from Last 1 Encounters:   10/08/18 36.6  C (97.8  F) (Temporal)     BP Readings from Last 6 Encounters:   10/08/18 115/71   09/27/18 104/66   09/24/18 127/52   07/16/18 105/69   06/20/18 107/70   05/18/18 116/75     Pulse Readings from Last 4 Encounters:   10/05/18 61   09/27/18 86   09/24/18 58   07/16/18 72     Resp Readings from Last 1 Encounters:   10/08/18 18     SpO2 Readings from Last 1 Encounters:   10/08/18 98%     Recent Labs   Lab Test  10/08/18   0812  10/07/18   1325   NA  136  135   POTASSIUM  3.5  3.7   CHLORIDE  103  100   CO2  26  27   ANIONGAP  7  8   GLC  171*  232*   BUN  19  20   CR  1.21  1.35*   JUSTINO  8.7  8.8     Recent Labs   Lab Test  10/03/18   0636  09/22/18   0042   AST  15  11   ALT  14  27   ALKPHOS  73  126   BILITOTAL  0.6  0.9     Recent Labs   Lab Test  10/08/18   0812  10/07/18   1325   WBC  4.5  6.4   HGB  9.5*  10.2*   PLT  133*  154     Recent Labs   Lab Test  10/08/18   0812   ABO  A   RH  Pos     Recent  Labs   Lab Test  10/08/18   0812  10/04/18   0839  10/03/18   0636   INR  1.20*  1.37*   --    PTT   --   33  101*      Recent Labs   Lab Test  03/13/17   0758  03/12/17   2123  02/07/17   1855   TROPI  0.026  0.030  <0.015  The 99th percentile for upper reference range is 0.045 ug/L.  Troponin values in   the range of 0.045 - 0.120 ug/L may be associated with risks of adverse   clinical events.       Recent Labs   Lab Test  02/12/17   1103  02/12/17   1012   PH  7.38  7.39   PCO2  41  40   PO2  243*  262*   HCO3  24  24     No results for input(s): HCG in the last 62871 hours.    Recent Results (from the past 744 hour(s))   POC US Retroperitoneal Limited    Impression    [facility] Procedure Note    Limited Bedside ED Renal Ultrasound:    PERFORMED BY: Dr. Jude Chirinos  INDICATIONS:  Acute kidney injury  PROBE: Low frequency convex probe  BODY LOCATION:  Abdomen  FINDINGS:  The ultrasound was performed with longitudinal and transverse views.   Right Kidney:   Hydronephrosis:  None   Renal cyst:  None  Left Kidney:   Hydronephrosis:  None   Renal cyst:  Single  INTERPRETATION:  No hydronephrosis, single simple cyst in left kidney.   IMAGE DOCUMENTATION: Images were archived to PACs system.    [facility] Procedure Note      Limited Bedside ED Ultrasound of the Urinary Bladder:    PERFORMED BY: Dr. Jude Chirinos  INDICATIONS:  Acute kidney injury  PROBE: Low frequency convex probe  BODY LOCATION:  Abdomen  FINDINGS:  Visualization of the bladder in longitudinal and transverse views demonstrated a mildly full state post-void.  The bladder dimensions measured: 8.1 cm width X 7.6 height cm X 5.1 cm length. Calculated volume 255 ml. Incidental prostate hypertrophy noted.   INTERPRETATION:  Total calculated volume was estimated at 255 ml post-void.    IMAGE DOCUMENTATION: Images were archived to PACs system.       CT Head w/o Contrast    Narrative    CT HEAD W/O CONTRAST 9/22/2018 10:42  AM    Provided History: Hx of fall, hit head - supratherapeutic INR;     Comparison: 1/24/2017.    Technique: Using multidetector thin collimation helical acquisition  technique, axial, coronal and sagittal CT images from the skull base  to the vertex were obtained without intravenous contrast.     Findings:    No intracranial hemorrhage, mass effect, or midline shift. The  ventricles are proportionate to the cerebral sulci. The gray to white  matter differentiation of the cerebral hemispheres is preserved. The  basal cisterns are patent. Mild encephalization in the anterior  parasagittal frontal lobes bilaterally. Age-related parenchymal volume  loss. Scattered periventricular and subcortical white matter  hypodensities, nonspecific though likely sequelae of chronic small  vessel ischemic disease. Chronic lacunar infarct versus prominent  perivascular spaces in the posterior limb of the right internal  capsule.    The visualized paranasal sinuses are clear. The mastoid air cells are  clear.       Impression    Impression:   1. No acute intracranial pathology.  2. Generalized parenchymal volume loss and mild leukoaraiosis.    I have personally reviewed the examination and initial interpretation  and I agree with the findings.    RAMILA GODOY MD   MR Foot Right w/o & w Contrast    Narrative    MR FOOT RIGHT WITH AND WITHOUT CONTRAST October 1, 2018 6:38 AM    HISTORY: Open wound lateral plantar right forefoot for six to seven  weeks. Diabetic patient. Evaluate for osteomyelitis.    TECHNIQUE: Multisequence multiplanar MRI without and with IV contrast.  8 mL Gadavist given intravenously.    COMPARISON: None.    FINDINGS: Some sequences are compromised by motion artifact. Area of  presumed current skin ulcer is seen in the lateral plantar region at  the level of the fifth metatarsal head (image 4 of series 5). The  plantar soft tissues of the little toe are swollen but show decreased  contrast enhancement consistent  with tissue devitalization. There is  no evidence for an abscess in this region. There is mild bone marrow  edema in the fifth metatarsal head as well as at the base of the fifth  proximal phalanx. This may be reactive edema alone, especially since  the ulcer appears relatively shallow. Also, there is no cortical  effacement. However, very early osteomyelitis is not excluded (image 4  of series 5). No fifth metatarsophalangeal joint effusion.    The remainder of the bony structures are unremarkable. Edema and mild  contrast enhancement of the plantar intrinsic muscles of the foot,  commonly seen in diabetic patients. There is some unusual foci of  decreased signal in the plantar soft tissues at the level of the  second and third metatarsal heads. This appears to be some type of  postoperative change.      Impression    IMPRESSION:  1. Plantar lateral skin ulcer at the level of the fifth metatarsal  head. Devitalized soft tissue thickening distal to the ulcer in the  plantar fifth toe.  2. Nonspecific mild bone marrow edema in the fifth metatarsal head and  proximal phalanx base may be reactive change alone but early  osteomyelitis is not excluded.  3. Probable postoperative changes in the soft tissues of the distal  forefoot more medially.    RACHAEL SPARKS MD   US WALLACE Doppler No Exercise    Narrative    US WALLACE DOPPLER NO EXERCISE, 1-2 LEVELS,??? BILAT  10/1/2018 5:13 PM     HISTORY: Assess blood flow, osteomyelitis of the right foot.    COMPARISON: None.    FINDINGS:   The resting right and left ankle-brachial indices are 0.60 and 1.05  respectively.    Waveform analysis indicates monophasic waveforms in the distal right  tibial arteries and biphasic to triphasic waveforms in the distal left  tibial arteries.      Impression    IMPRESSION: WALLACE on the left is within normal limits. WALLACE on the right  would indicate moderate arterial insufficiency. Given osteomyelitis of  the right foot, further evaluation and  possible intervention with  angiography could be performed.      WALLACE Diagnostic Criteria      >/= 1.3          Non compressible  0.95-1.0          Normal  0.90-0.94        Mild PAD  0.50-0.89        Moderate PAD  0.20-0.49        Severe PAD  <0.20               Critical    DYLAN MILLER MD   XR Chest Port 1 View    Narrative    CHEST ONE VIEW PORTABLE       PROCEDURE DATE:  10/4/2018 6:26 AM     HISTORY:   Evaluate CHF;     COMPARISON:  5/25/2017    FINDINGS/    Impression    IMPRESSION:   The right costophrenic angle and right lung bases is incompletely  imaged and therefore not evaluated. Postsurgical changes from prior  CABG included sternotomy wires and mediastinal clips. Posterior spinal  fusion hardware is present. Cardiomediastinal silhouette is not  enlarged. Increased interstitial markings and perihilar opacities  favoring pulmonary edema.    RUTH ANN JURADO MD   XR Chest 1 View    Narrative    CHEST ONE VIEW UPRIGHT 10/4/2018 8:35 AM     HISTORY: Pre-op.     COMPARISON: October 4, 2018      Impression    IMPRESSION: No acute infiltrates. There is a sternotomy and vascular  clips consistent with CABG.    ROSANA HERNANDEZ MD        Anesthesia Evaluation     . Pt has had prior anesthetic. Type: General    No history of anesthetic complications          ROS/MED HX    ENT/Pulmonary:     (+)tobacco use, Past use , . .    Neurologic:     (+)neuropathy     Cardiovascular: Comment: Fluid overload over weekend related to blood transfusion requiring diuresis    (+) Dyslipidemia, hypertension-Peripheral Vascular Disease-CAD, -CABG (3 vessel CABG)-stent (BHAVNA x 2 to LAD 3/2018 - Plavix),Drug Eluting Stent .. Taking blood thinners (plavix/ coumadin) : . CHF etiology: systolic dysfunction . . :. dysrhythmias a-fib and a-flutter, . Previous cardiac testing Echodate:10/5/18results:The visual ejection fraction is estimated at 35-40%.  There is mild-moderate global hypokinesia of the left ventricle.  Mildly decreased right  ventricular systolic functiondate: results: date: results: date: results:          METS/Exercise Tolerance:  3 - Able to walk 1-2 blocks without stopping   Hematologic:     (+) Anemia, History of Transfusion -      Musculoskeletal: Comment: Right lower extremity  Diabetic foot ulcer with osteomyelitis  (+) arthritis, , , -       GI/Hepatic:        (-) GERD and hiatal hernia   Renal/Genitourinary:     (+) chronic renal disease (stage 3 chronic kidney disease), BPH,       Endo:     (+) type II DM Diabetic complications: nephropathy neuropathy cardiac problems, .      Psychiatric:         Infectious Disease:         Malignancy:   (+) Malignancy History of Lymphoma/Leukemia  Chronic lymphocytic leukemia of b cell type        Other:                                    Anesthesia Plan      History & Physical Review  History and physical reviewed and following examination; no interval change.    ASA Status:  4 .    NPO Status:  > 8 hours    Plan for General, LMA and Periph. Nerve Block for postop pain with Intravenous and Propofol induction. Maintenance will be Inhalation and Balanced.    PONV prophylaxis:  Ondansetron (or other 5HT-3) and Dexamethasone or Solumedrol  Additional equipment: 2nd IV      Postoperative Care  Postoperative pain management:  IV analgesics and Peripheral nerve block (Single Shot).      Consents  Anesthetic plan, risks, benefits and alternatives discussed with:  Patient..                          .

## 2018-10-08 NOTE — PLAN OF CARE
Problem: Patient Care Overview  Goal: Plan of Care/Patient Progress Review  Outcome: No Change  A&Ox4. VSS on RA. Up with 1, GB & walker. NPO. , 173. IV SL; IV abx continue. LS clear. Denies pain. Tele SR with BBB. Pt sent down to pre-op. Continue to monitor.

## 2018-10-08 NOTE — PLAN OF CARE
Problem: Patient Care Overview  Goal: Plan of Care/Patient Progress Review  Outcome: No Change  Pt A&O x4. VSS on RA. Pt denies pain.  Tele NSR w/BBB. IV SL with intermittent antibiotics. Dressing changed last evening at 2000 due to excess drainage. Pt NPO since midnight. BG stable -  187 and 183. Planned BKA today at 1420. Pt voiding well in urinal and had BM overnight in bedside commode.

## 2018-10-08 NOTE — ANESTHESIA PROCEDURE NOTES
Peripheral nerve/Neuraxial procedure note : Popliteal, Sciatic, Saphenous and Adductor canal  Pre-Procedure  Performed by SHIRLEY DOE  Location: pre-op      Pre-Anesthestic Checklist: patient identified, IV checked, site marked, risks and benefits discussed, informed consent, monitors and equipment checked, pre-op evaluation, at physician/surgeon's request and post-op pain management    Timeout  Correct Patient: Yes   Correct Procedure: Yes   Correct Site: Yes   Correct Laterality: Yes   Correct Position: Yes   Site Marked: Yes   .   Procedure Documentation    .    Procedure:    Popliteal, Sciatic, Saphenous and Adductor canal.  Local skin infiltrated with 1 mL of 1% lidocaine.     Ultrasound used to identify targeted nerve, plexus, or vascular marker and placed a needle adjacent to it., Ultrasound was used to visualize the spread of the anesthetic in close proximity to the above stated nerve. A permanent image is entered into the patient's record.  Patient Prep;povidone-iodine 7.5% surgical scrub.  .  Needle: insulated Needle Gauge: 21.    Needle Length (Inches) 3.13  Insertion Method: Single Shot.       Assessment/Narrative  Paresthesias: No.  .  The placement was negative for: blood aspirated, painful injection and site bleeding.  Bolus given via needle. No blood aspirated via catheter.   Secured via.   Complications:. Comments:  Bolus via needle, 25 ml of 0.5% bupivacaine with 1:400,000 epinephrine around the sciatic nerve, 15 ml of 0.5% bupivacaine with 1:400,000 epinephrine around the saphenous  Patient tolerated well, was mildly sedated but communicative throughout the procedure.   The surgeon has given a verbal order transferring care of this patient to me for the performance of regional analgesia block for post op pain control. It is requested of me because I am uniquely trained and qualified to perform this block and the surgeon is neither trained nor qualified to perform this procedure.

## 2018-10-08 NOTE — OP NOTE
Procedure Date: 10/08/2018      PREOPERATIVE DIAGNOSIS:  Diabetic right foot with osteomyelitis.      POSTOPERATIVE DIAGNOSIS:  Diabetic right foot with osteomyelitis.      PROCEDURE:  Open distal right lower leg amputation with placement of wound VAC.      SURGEON:  Amador Vick MD      ASSISTANT:  Anival Mcgarry MD, [facility] Surgery Resident.      ANESTHESIA:  LMA general anesthesia.      ESTIMATED BLOOD LOSS:  50 mL.      OPERATIVE INDICATIONS:  This gentleman is a 77-year-old diabetic who presents this admission with a large plantar abscess involving the majority of the forefoot.  He has known peripheral arterial disease.  It is the consensus opinion of both Vascular Surgery and Podiatry that the foot is nonsalvageable.  We have recommended a right BKA.  Because of the appearance of the foot with an associated plantar abscess, I prefer to do this in a staged manner beginning with an open distal right lower extremity amputation today.      OPERATIVE FINDINGS:  We transected the distal right leg just above the ankle.  All soft tissue was viable.  Minimal flow was observed in both the posterior tibial and anterior tibial arteries, although there was pulsatile flow in the peroneal artery.  Following open amputation of the distal right lower leg, a wound VAC was placed.      DESCRIPTION OF PROCEDURE:  After informed consent was obtained, the patient was brought to the operating room and placed on the table in a supine position, after which LMA general anesthesia was obtained without incident.  The right lower extremity was prepped and draped in the usual sterile fashion including usage of an impermeable stockinette and Coban to isolate the right foot.  Time out was called and we verified the patient's identity, the operative site, and the proposed procedure.      A circumferential incision was made roughly 1 fingerbreadth above the ankle joint.  Dissection proceeded sharply downward to ligate and divide the  right greater saphenous vein.  Dissection continued through the anterior and superficial compartments to identify the structures of the posterior tibial and anterior tibial neurovascular bundles.  Each of those structures was individually ligated between silk ties and sluggish flow was noted in both of those arteries.  Periosteum for the tibia and fibula was cleared off using electrocautery.  We subsequently divided the tibia and fibula using the oscillating knife.  The posterior musculature was divided with the amputation knife and the specimen was handed off from the field.  Brisk pulsatile flow was noted in the peroneal artery.  Those structures were individually ligated with 2-0 silk stick ties.  The lesser saphenous vein was also ligated with a silk tie.  Bone wax was used on the transected tibia and fibula as there was some rather diffuse bone edge and marrow oozing.  Excellent hemostasis was achieved utilizing electrocautery and some additional silk stick ties.  The stump was irrigated and the surrounding skin edge was prepped.  A small black sponge was selected and placed over the edge of the stump.  It was secured to the skin with the sterile adhesive sheets.  The sponge was connected to the suction apparatus at 125 mmHg continuous suction.  We had excellent sponge contracture and no leak.  Final sponge and needle count were reported as correct.  The patient tolerated the procedure without incident.  He was returned awake and alert to the postop recovery area.  Plan will be to bring him back to formalize or close his right below-knee amputation in the next 48-72 hours.         AMADOR VICK MD             D: 10/08/2018   T: 10/08/2018   MT: JOHNIE      Name:     PETER BUTLER   MRN:      [id number]        Account:        [id number]   :      1940           Procedure Date: 10/08/2018      Document: [id number]       cc: Amador Vick MD

## 2018-10-08 NOTE — ANESTHESIA CARE TRANSFER NOTE
Patient: Tad Manning    Procedure(s):  OPEN RIGHT LOWER LEG AMPUTATION WITH WOUND VAC PLACEMENT    EBL: 50mL - Wound Class: IV-Dirty or Infected   - Wound Class: IV-Dirty or Infected    Diagnosis: DIABETIC RIGHT FOOR WITH OSTEOMYLETIS  Diagnosis Additional Information: No value filed.    Anesthesia Type:   General, LMA, Periph. Nerve Block for postop pain     Note:  Airway :Face Mask  Patient transferred to:PACU  Comments: At end of procedure, spontaneous respirations, adequate tidal volumes, followed commands to voice, LMA removed atraumatically, oropharynx suctioned, airway patent after LMA removal. Oxygen via facemask at 6 liters per minute to PACU. Oxygen tubing connected to wall O2 in PACU, SpO2, NiBP, and EKG monitors and alarms on and functioning, Roxann Hugger warmer connected to patient gown, report on patient's clinical status given to PACU RN, RN questions answered.Handoff Report: Identifed the Patient, Identified the Reponsible Provider, Reviewed the pertinent medical history, Discussed the surgical course, Reviewed Intra-OP anesthesia mangement and issues during anesthesia, Set expectations for post-procedure period and Allowed opportunity for questions and acknowledgement of understanding      Vitals: (Last set prior to Anesthesia Care Transfer)    CRNA VITALS  10/8/2018 1531 - 10/8/2018 1604      10/8/2018             NIBP: 139/65    NIBP Mean: 82    Resp Rate (set): 10                Electronically Signed By: OFELIA Dunaway CRNA  October 8, 2018  4:04 PM

## 2018-10-08 NOTE — PROGRESS NOTES
[facility]    Hospitalist Progress Note    Assessment & Plan   Tad Manning is a 77 year old male who was admitted on 9/30/2018 with:    Impression:   Principal Problem:    Osteomyelitis Right Foot  Active Problems:    DM type 2 w neuropathy -- Hgb A1C 9.0 on 9/22/18    HTN (hypertension)    CAD -- S/P CABG    Osteomyelitis left 2nd Toe s/p amputaion 1/10/14    CRF (chronic renal failure), stage 3 (moderate) (H)    Chronic systolic heart failure (H)    Atrial fibrillation -- on Warfarin    Hyponatremia      Plan:  Continue antibiotics, right BKA planned this afternoon, keep NPO and will hold scheduled insulin. .     DVT Prophylaxis: Pneumatic Compression Devices  Code Status: Full Code    Disposition: Expected discharge in 4 days once recovers from BKA, will need TCU (Thurs or Friday).    Mikael Thomas MD  Pager [phone]  Cell Phone [phone]  Text Page (7am to 6pm)    Interval History   Feels OK, no pain in right foot (has neuropathy).     Physical Exam   Temp: 98.1  F (36.7  C) Temp src: Oral BP: 103/56   Heart Rate: 62 Resp: 16 SpO2: 99 % O2 Device: None (Room air)    Vitals:    10/05/18 0607 10/06/18 0514 10/07/18 0310   Weight: 84.4 kg (186 lb 1.6 oz) 80.2 kg (176 lb 12.8 oz) 79.4 kg (175 lb 0.7 oz)     Vital Signs with Ranges  Temp:  [98  F (36.7  C)-98.2  F (36.8  C)] 98.1  F (36.7  C)  Heart Rate:  [62-74] 62  Resp:  [16] 16  BP: (103-141)/(56-64) 103/56  SpO2:  [98 %-99 %] 99 %  I/O last 3 completed shifts:  In: -   Out: 1725 [Urine:1725]    # Pain Assessment:  Current Pain Score 10/7/2018   Patient currently in pain? denies   Pain score (0-10) -   Pain location -   Pain descriptors -   Tad cooper pain level was assessed and he currently denies pain.        Constitutional: Awake, alert, cooperative, no apparent distress  Respiratory: Clear to auscultation bilaterally, no crackles or wheezing  Cardiovascular: Regular rate and rhythm, normal S1 and S2, and no murmur  noted  GI: Normal bowel sounds, soft, non-distended, non-tender  Extrem: No calf tenderness, trace bilateral ankle edema  Neuro: Ox3, no focal motor deficits, feet numb to level of just above ankle    Medications       atorvastatin  20 mg Oral Daily     ceFAZolin  1 g Intravenous See Admin Instructions     ceFAZolin  2 g Intravenous Pre-Op/Pre-procedure x 1 dose     cholecalciferol  1,000 Units Oral Daily     citalopram (celeXA) tablet 10 mg  10 mg Oral Daily     clopidogrel  75 mg Oral Daily     ferrous sulfate  325 mg Oral Daily     furosemide  40 mg Intravenous BID     insulin aspart  8 Units Subcutaneous TID w/meals     insulin aspart  1-7 Units Subcutaneous TID AC     insulin aspart  1-5 Units Subcutaneous At Bedtime     insulin glargine  20 Units Subcutaneous At Bedtime     melatonin tablet 5 mg  5 mg Oral At Bedtime     metoprolol succinate  100 mg Oral Daily     miconazole   Topical TID     pantoprazole  40 mg Oral BID AC     piperacillin-tazobactam  3.375 g Intravenous Q6H     sodium chloride (PF)  3 mL Intracatheter Q8H     vancomycin (VANCOCIN) IV  1,750 mg Intravenous Q24H       Data     Recent Labs  Lab 10/08/18  0812 10/07/18  1325 10/06/18  2325 10/06/18  1012  10/04/18  0839  10/03/18  0636  10/02/18  0626   WBC 4.5 6.4  --  5.8  < > 6.3  --  6.6  < >  --    HGB 9.5* 10.2*  --  9.7*  < > 9.3*  < > 6.9*  --   --    MCV 89 90  --  88  < > 87  --  90  < >  --    * 154  --  132*  < > 103*  --  107*  < >  --    INR 1.20*  --   --   --   --  1.37*  --   --   --  1.44*    135  --  136  < > 136  --  135  < >  --    POTASSIUM 3.5 3.7 3.7 3.3*  < > 3.6  --  3.2*  < >  --    CHLORIDE 103 100  --  103  < > 104  --  103  < >  --    CO2 26 27  --  25  < > 22  --  22  < >  --    BUN 19 20  --  19  < > 17  --  30  < >  --    CR 1.21 1.35*  --  1.20  < > 1.14  --  1.28*  --  1.50*   ANIONGAP 7 8  --  8  < > 10  --  10  < >  --    JUSTINO 8.7 8.8  --  8.2*  < > 7.9*  --  8.0*  < >  --    * 232*  --   170*  < > 159*  --  135*  < >  --    ALBUMIN  --   --   --   --   --   --   --  2.0*  --   --    PROTTOTAL  --   --   --   --   --   --   --  5.5*  --   --    BILITOTAL  --   --   --   --   --   --   --  0.6  --   --    ALKPHOS  --   --   --   --   --   --   --  73  --   --    ALT  --   --   --   --   --   --   --  14  --   --    AST  --   --   --   --   --   --   --  15  --   --    < > = values in this interval not displayed.    Imaging:   No results found for this or any previous visit (from the past 24 hour(s)).

## 2018-10-08 NOTE — ANESTHESIA POSTPROCEDURE EVALUATION
Patient: Tad Manning    Procedure(s):  OPEN RIGHT LOWER LEG AMPUTATION WITH WOUND VAC PLACEMENT    EBL: 50mL - Wound Class: IV-Dirty or Infected   - Wound Class: IV-Dirty or Infected    Diagnosis:DIABETIC RIGHT FOOR WITH OSTEOMYLETIS  Diagnosis Additional Information: No value filed.    Anesthesia Type:  General, LMA, Periph. Nerve Block for postop pain    Note:  Anesthesia Post Evaluation    Patient location during evaluation: PACU  Patient participation: Able to fully participate in evaluation  Level of consciousness: awake  Pain management: adequate  Airway patency: patent  Cardiovascular status: acceptable  Respiratory status: acceptable  Hydration status: acceptable  PONV: none             Last vitals:  Vitals:    10/08/18 1430 10/08/18 1440 10/08/18 1602   BP: 153/74 167/88 137/67   Pulse:      Resp: 20 20 16   Temp:   36  C (96.8  F)   SpO2:   100%         Electronically Signed By: Amador Giordano MD  October 8, 2018  4:20 PM

## 2018-10-08 NOTE — PROGRESS NOTES
"SPIRITUAL [facility] SERVICES Progress Note  [facility]    Visited pt Tad per the length of stay. Pt's nickname is Hima.Pt said that \"My brother's family and my partner, Dino are very important.\"    Pt addressed that he has diabetes and is going to have a below -knee amputation this afternoon. He expressed his emotion of fear before the surgery and  confirmed his emotion.     provided listening, reading the scripture, and prayer.      I will follow up pt for the duration of stay.     Carolina Amezquita  Chaplain Resident    "

## 2018-10-08 NOTE — PROGRESS NOTES
Vascular Surgery Note     Patient doing well this morning. He was diuresed over the weekend due to overload from blood transfusion. Hbg stable over the weekend. He is on schedule for RLE BKA this afternoon. Please keep patient NPO.

## 2018-10-08 NOTE — BRIEF OP NOTE
[facility] Brief Operative Note    Pre-operative diagnosis: DIABETIC RIGHT FOOR WITH OSTEOMYLETIS   Post-operative diagnosis same   Procedure: Procedure(s):  OPEN RIGHT LOWER LEG AMPUTATION WITH WOUND VAC PLACEMENT    EBL: 50mL - Wound Class: IV-Dirty or Infected   - Wound Class: IV-Dirty or Infected   Surgeon(s): Surgeon(s) and Role:     * Amador Vick MD - Primary     * Anival Mcgarry MD - Resident - Assisting   Estimated blood loss: 50 ml   Specimens:   ID Type Source Tests Collected by Time Destination   A : RIGHT FOOT AMPUTATION Tissue Foot, Right SURGICAL PATHOLOGY EXAM Amador Vick MD 10/8/2018  3:09 PM       Findings: Diabetic foot ulcer with known underlying osteomyelitis.  Sluggish flow through PT artery.  Calcified arteries.

## 2018-10-09 LAB
GLUCOSE BLDC GLUCOMTR-MCNC: 124 MG/DL (ref 70–99)
GLUCOSE BLDC GLUCOMTR-MCNC: 130 MG/DL (ref 70–99)
GLUCOSE BLDC GLUCOMTR-MCNC: 144 MG/DL (ref 70–99)
GLUCOSE BLDC GLUCOMTR-MCNC: 208 MG/DL (ref 70–99)
GLUCOSE BLDC GLUCOMTR-MCNC: 211 MG/DL (ref 70–99)

## 2018-10-09 PROCEDURE — 12000007 ZZH R&B INTERMEDIATE

## 2018-10-09 PROCEDURE — 25000132 ZZH RX MED GY IP 250 OP 250 PS 637: Mod: GY | Performed by: INTERNAL MEDICINE

## 2018-10-09 PROCEDURE — 25000128 H RX IP 250 OP 636: Performed by: INTERNAL MEDICINE

## 2018-10-09 PROCEDURE — A9270 NON-COVERED ITEM OR SERVICE: HCPCS | Mod: GY | Performed by: INTERNAL MEDICINE

## 2018-10-09 PROCEDURE — 25000131 ZZH RX MED GY IP 250 OP 636 PS 637: Mod: GY | Performed by: INTERNAL MEDICINE

## 2018-10-09 PROCEDURE — 25000132 ZZH RX MED GY IP 250 OP 250 PS 637: Mod: GY | Performed by: SURGERY

## 2018-10-09 PROCEDURE — G0463 HOSPITAL OUTPT CLINIC VISIT: HCPCS

## 2018-10-09 PROCEDURE — 00000146 ZZHCL STATISTIC GLUCOSE BY METER IP

## 2018-10-09 PROCEDURE — 99232 SBSQ HOSP IP/OBS MODERATE 35: CPT | Performed by: INTERNAL MEDICINE

## 2018-10-09 PROCEDURE — A9270 NON-COVERED ITEM OR SERVICE: HCPCS | Mod: GY | Performed by: SURGERY

## 2018-10-09 PROCEDURE — E2402 NEG PRESS WOUND THERAPY PUMP: HCPCS

## 2018-10-09 RX ORDER — SENNOSIDES 8.6 MG
2 TABLET ORAL 2 TIMES DAILY
Status: DISCONTINUED | OUTPATIENT
Start: 2018-10-09 | End: 2018-10-16 | Stop reason: HOSPADM

## 2018-10-09 RX ORDER — POLYETHYLENE GLYCOL 3350 17 G/17G
34 POWDER, FOR SOLUTION ORAL ONCE
Status: COMPLETED | OUTPATIENT
Start: 2018-10-09 | End: 2018-10-09

## 2018-10-09 RX ORDER — POLYETHYLENE GLYCOL 3350 17 G/17G
17 POWDER, FOR SOLUTION ORAL 2 TIMES DAILY PRN
Status: DISCONTINUED | OUTPATIENT
Start: 2018-10-09 | End: 2018-10-16 | Stop reason: HOSPADM

## 2018-10-09 RX ADMIN — FUROSEMIDE 40 MG: 10 INJECTION, SOLUTION INTRAVENOUS at 15:53

## 2018-10-09 RX ADMIN — VANCOMYCIN HYDROCHLORIDE 1750 MG: 5 INJECTION, POWDER, LYOPHILIZED, FOR SOLUTION INTRAVENOUS at 00:14

## 2018-10-09 RX ADMIN — PIPERACILLIN SODIUM,TAZOBACTAM SODIUM 3.38 G: 3; .375 INJECTION, POWDER, FOR SOLUTION INTRAVENOUS at 04:21

## 2018-10-09 RX ADMIN — OXYCODONE HYDROCHLORIDE 5 MG: 5 TABLET ORAL at 21:58

## 2018-10-09 RX ADMIN — VITAMIN D, TAB 1000IU (100/BT) 1000 UNITS: 25 TAB at 08:53

## 2018-10-09 RX ADMIN — SENNOSIDES 2 TABLET: 8.6 TABLET, FILM COATED ORAL at 09:52

## 2018-10-09 RX ADMIN — CLOPIDOGREL 75 MG: 75 TABLET, FILM COATED ORAL at 08:53

## 2018-10-09 RX ADMIN — FUROSEMIDE 40 MG: 10 INJECTION, SOLUTION INTRAVENOUS at 08:57

## 2018-10-09 RX ADMIN — METOPROLOL SUCCINATE 100 MG: 100 TABLET, EXTENDED RELEASE ORAL at 21:32

## 2018-10-09 RX ADMIN — OXYCODONE HYDROCHLORIDE 5 MG: 5 TABLET ORAL at 17:54

## 2018-10-09 RX ADMIN — MELATONIN 5 MG TABLET 5 MG: at 21:58

## 2018-10-09 RX ADMIN — INSULIN ASPART 1 UNITS: 100 INJECTION, SOLUTION INTRAVENOUS; SUBCUTANEOUS at 18:15

## 2018-10-09 RX ADMIN — CITALOPRAM HYDROBROMIDE 10 MG: 10 TABLET ORAL at 08:54

## 2018-10-09 RX ADMIN — INSULIN GLARGINE 16 UNITS: 100 INJECTION, SOLUTION SUBCUTANEOUS at 21:58

## 2018-10-09 RX ADMIN — MICONAZOLE NITRATE: 2 POWDER TOPICAL at 16:28

## 2018-10-09 RX ADMIN — OXYCODONE HYDROCHLORIDE 5 MG: 5 TABLET ORAL at 06:50

## 2018-10-09 RX ADMIN — MICONAZOLE NITRATE: 2 POWDER TOPICAL at 10:02

## 2018-10-09 RX ADMIN — OXYCODONE HYDROCHLORIDE 5 MG: 5 TABLET ORAL at 23:04

## 2018-10-09 RX ADMIN — OXYCODONE HYDROCHLORIDE 5 MG: 5 TABLET ORAL at 11:59

## 2018-10-09 RX ADMIN — FERROUS SULFATE TAB 325 MG (65 MG ELEMENTAL FE) 325 MG: 325 (65 FE) TAB at 08:53

## 2018-10-09 RX ADMIN — SENNOSIDES 2 TABLET: 8.6 TABLET, FILM COATED ORAL at 21:32

## 2018-10-09 RX ADMIN — POLYETHYLENE GLYCOL 3350 34 G: 17 POWDER, FOR SOLUTION ORAL at 09:53

## 2018-10-09 RX ADMIN — INSULIN ASPART 2 UNITS: 100 INJECTION, SOLUTION INTRAVENOUS; SUBCUTANEOUS at 13:52

## 2018-10-09 RX ADMIN — PANTOPRAZOLE SODIUM 40 MG: 40 TABLET, DELAYED RELEASE ORAL at 15:54

## 2018-10-09 RX ADMIN — PANTOPRAZOLE SODIUM 40 MG: 40 TABLET, DELAYED RELEASE ORAL at 06:50

## 2018-10-09 RX ADMIN — MICONAZOLE NITRATE: 2 POWDER TOPICAL at 21:58

## 2018-10-09 RX ADMIN — PIPERACILLIN SODIUM,TAZOBACTAM SODIUM 3.38 G: 3; .375 INJECTION, POWDER, FOR SOLUTION INTRAVENOUS at 09:52

## 2018-10-09 RX ADMIN — ATORVASTATIN CALCIUM 20 MG: 20 TABLET, FILM COATED ORAL at 08:53

## 2018-10-09 ASSESSMENT — ACTIVITIES OF DAILY LIVING (ADL)
ADLS_ACUITY_SCORE: 13
ADLS_ACUITY_SCORE: 12

## 2018-10-09 NOTE — PLAN OF CARE
Problem: Patient Care Overview  Goal: Plan of Care/Patient Progress Review  Outcome: No Change  Oral pain medications given with good results, pt tolerates diet, passing flatus, good urine output, repositioned, mepilex to coccyx, blanchable erythema, powder used to groin area per orders, wound vac in place, dressing clean dry and intact

## 2018-10-09 NOTE — PROGRESS NOTES
[facility]    Hospitalist Progress Note    Assessment & Plan   Tad Manning is a 77 year old male with DM type 2 and peripheral neuropathy, who was admitted on 9/30/2018 with osteomyelitis right foot:    Impression:   Principal Problem:    Osteomyelitis Right Foot -- S/P foot amp 10/8/18   -- awaiting final BKA (once infection better)  Active Problems:    DM type 2 w neuropathy -- Hgb A1C 9.0 on 9/22/18    HTN (hypertension)    CAD -- S/P CABG    Osteomyelitis left 2nd Toe s/p amputaion 1/10/14    CRF (chronic renal failure), stage 3 (moderate) (H)    Chronic systolic heart failure (H)    Atrial fibrillation -- on Warfarin    Hyponatremia -- corrected     Constipation      Plan:  Will stop IV Vanco (osteomyelitis has been surgically resected, and skin appears clean) and blood cultures negative, Right BKA per surgery, Senokot and Miralax added for constipation.  Will increase Meal time Novolog to 10 units tid, and reduce Lantus daily to 16 units (for better daytime control, and to avoid AM hypoglycemia).      DVT Prophylaxis: Pneumatic Compression Devices  Code Status: Full Code    Disposition: Expected discharge once recovers from BKA, will need TCU.     Mikael Thomas MD  Pager [phone]  Cell Phone [phone]  Text Page (7am to 6pm)    Interval History   Right foot amputation yesterday, then BKA in 1-2 days.  Current pain is 4-5 out of 10.  No BM for 2-3 days.      Physical Exam   Temp: 99.2  F (37.3  C) Temp src: Oral BP: 107/55   Heart Rate: 60 Resp: 16 SpO2: 94 % O2 Device: None (Room air) Oxygen Delivery: 1 LPM  Vitals:    10/06/18 0514 10/07/18 0310 10/09/18 0600   Weight: 80.2 kg (176 lb 12.8 oz) 79.4 kg (175 lb 0.7 oz) 82.6 kg (182 lb 1.6 oz)     Vital Signs with Ranges  Temp:  [96.8  F (36  C)-99.4  F (37.4  C)] 99.2  F (37.3  C)  Heart Rate:  [60-74] 60  Resp:  [10-20] 16  BP: (107-167)/(55-88) 107/55  SpO2:  [93 %-100 %] 94 %  I/O last 3 completed shifts:  In: 1300  [I.V.:1300]  Out: 2445 [Urine:2225; Drains:200; Blood:20]    # Pain Assessment:  Current Pain Score 10/9/2018   Patient currently in pain? -   Pain score (0-10) 4   Pain location -   Pain descriptors -   Tad cooper pain level was assessed and he currently denies pain.        Constitutional: Awake, alert, cooperative, no apparent distress  Respiratory: Clear to auscultation bilaterally, no crackles or wheezing  Cardiovascular: Regular rate and rhythm, normal S1 and S2, and no murmur noted  GI: Normal bowel sounds, soft, non-distended, non-tender  Extrem: No calf tenderness, no ankle edema, right foot amputation with stump covered with clear dressing.   Neuro: Ox3, no focal motor deficits, feet numb to level of just above ankle on left.     Medications       atorvastatin  20 mg Oral Daily     cholecalciferol  1,000 Units Oral Daily     citalopram (celeXA) tablet 10 mg  10 mg Oral Daily     clopidogrel  75 mg Oral Daily     ferrous sulfate  325 mg Oral Daily     furosemide  40 mg Intravenous BID     insulin aspart  8 Units Subcutaneous TID w/meals     insulin aspart  1-7 Units Subcutaneous TID AC     insulin aspart  1-5 Units Subcutaneous At Bedtime     insulin glargine  20 Units Subcutaneous At Bedtime     melatonin tablet 5 mg  5 mg Oral At Bedtime     metoprolol succinate  100 mg Oral Daily     miconazole   Topical TID     pantoprazole  40 mg Oral BID AC     piperacillin-tazobactam  3.375 g Intravenous Q6H     sennosides  2 tablet Oral BID     sodium chloride (PF)  3 mL Intracatheter Q8H     vancomycin (VANCOCIN) IV  1,750 mg Intravenous Q24H       Data     Recent Labs  Lab 10/08/18  0812 10/07/18  1325 10/06/18  2325 10/06/18  1012  10/04/18  0839  10/03/18  0636   WBC 4.5 6.4  --  5.8  < > 6.3  --  6.6   HGB 9.5* 10.2*  --  9.7*  < > 9.3*  < > 6.9*   MCV 89 90  --  88  < > 87  --  90   * 154  --  132*  < > 103*  --  107*   INR 1.20*  --   --   --   --  1.37*  --   --     135  --  136  < > 136  --  135    POTASSIUM 3.5 3.7 3.7 3.3*  < > 3.6  --  3.2*   CHLORIDE 103 100  --  103  < > 104  --  103   CO2 26 27  --  25  < > 22  --  22   BUN 19 20  --  19  < > 17  --  30   CR 1.21 1.35*  --  1.20  < > 1.14  --  1.28*   ANIONGAP 7 8  --  8  < > 10  --  10   JUSTINO 8.7 8.8  --  8.2*  < > 7.9*  --  8.0*   * 232*  --  170*  < > 159*  --  135*   ALBUMIN  --   --   --   --   --   --   --  2.0*   PROTTOTAL  --   --   --   --   --   --   --  5.5*   BILITOTAL  --   --   --   --   --   --   --  0.6   ALKPHOS  --   --   --   --   --   --   --  73   ALT  --   --   --   --   --   --   --  14   AST  --   --   --   --   --   --   --  15   < > = values in this interval not displayed.    Imaging:   No results found for this or any previous visit (from the past 24 hour(s)).

## 2018-10-09 NOTE — PROGRESS NOTES
[facility]    Vascular Surgery Progress Note    Assessment & Plan   Procedure(s):  AMPUTATE LEG BELOW KNEE  APPLY WOUND VAC   -1 Day Post-Op   78 y/o male s/p RLE BKA for non healing RLE foot wound   --Continue wound vac to 125mmHg  --Continue with IV abx   --Will plan for revision of RLE BKA possible later this week.     Principal Problem:    Osteomyelitis Right Foot -- S/P BKA 10/8/18  Active Problems:    DM type 2 w neuropathy -- Hgb A1C 9.0 on 9/22/18    HTN (hypertension)    CAD -- S/P CABG    Osteomyelitis left 2nd Toe s/p amputaion 1/10/14    CRF (chronic renal failure), stage 3 (moderate) (H)    Chronic systolic heart failure (H)    Atrial fibrillation -- on Warfarin    Hyponatremia      Min Lela Silva MD    Interval History   No acute events overnight. Patient resting comfortably in bed. Pain is controlled.     Physical Exam   Temp: 98.5  F (36.9  C) Temp src: Oral BP: 119/60   Heart Rate: 67 Resp: 16 SpO2: 98 % O2 Device: None (Room air) Oxygen Delivery: 1 LPM  Vitals:    10/06/18 0514 10/07/18 0310 10/09/18 0600   Weight: 176 lb 12.8 oz (80.2 kg) 175 lb 0.7 oz (79.4 kg) 182 lb 1.6 oz (82.6 kg)     Vital Signs with Ranges  Temp:  [96.8  F (36  C)-98.7  F (37.1  C)] 98.5  F (36.9  C)  Heart Rate:  [61-74] 67  Resp:  [10-20] 16  BP: (103-167)/(55-88) 119/60  SpO2:  [93 %-100 %] 98 %  I/O last 3 completed shifts:  In: 1300 [I.V.:1300]  Out: 2445 [Urine:2225; Drains:200; Blood:20]    PE:   NAD, awake and alert  Chest: S1 S2 present, RRR, no wheezing   Abd: soft, NT, ND, no rebound or guarding  Ex: RLE BKA stump in wound vac. About 200cc serosangrenous output in canister     Medications        atorvastatin  20 mg Oral Daily     cholecalciferol  1,000 Units Oral Daily     citalopram (celeXA) tablet 10 mg  10 mg Oral Daily     clopidogrel  75 mg Oral Daily     ferrous sulfate  325 mg Oral Daily     furosemide  40 mg Intravenous BID     insulin aspart  8 Units Subcutaneous TID w/meals     insulin  aspart  1-7 Units Subcutaneous TID AC     insulin aspart  1-5 Units Subcutaneous At Bedtime     insulin glargine  20 Units Subcutaneous At Bedtime     melatonin tablet 5 mg  5 mg Oral At Bedtime     metoprolol succinate  100 mg Oral Daily     miconazole   Topical TID     pantoprazole  40 mg Oral BID AC     piperacillin-tazobactam  3.375 g Intravenous Q6H     sodium chloride (PF)  3 mL Intracatheter Q8H     vancomycin (VANCOCIN) IV  1,750 mg Intravenous Q24H       Data   CBC RESULTS:   Recent Labs   Lab Test  10/08/18   0812   WBC  4.5   RBC  3.21*   HGB  9.5*   HCT  28.5*   MCV  89   MCH  29.6   MCHC  33.3   RDW  15.9*   PLT  133*

## 2018-10-09 NOTE — PLAN OF CARE
Problem: Patient Care Overview  Goal: Plan of Care/Patient Progress Review  Outcome: Improving  Pt. A & O x 4.  Arrived on the floor at 1730.  Pt. Denies pain.  BKA Left leg; wound vac in place; serosangious output.  VSS on RA.  Tolerating full liquid diet.  Blood sugars covered with insulin.  Voiding adequately.

## 2018-10-09 NOTE — PLAN OF CARE
Problem: Patient Care Overview  Goal: Plan of Care/Patient Progress Review  Outcome: Improving  A+Ox4 AVSS on room air. LS clear. BS active, flatus+. BKA site w/ wound vac in place w/ serosanguinous output. Pain controlled w/ prn oxycodone. Blanchable redness on coccyx w/ mepilex in place. Voiding via urinal.

## 2018-10-09 NOTE — PROGRESS NOTES
Focus:  Rt foot diabetic wound with osteo  10-9-18: Open distal right lower leg amputation with placement of wound VAC. Progress and post-op notes reviewed. Discussed pt with Nursing who reports candida nearly resolved.  Coccyx remains blanchable pinkness.   WOC will sign off. Please re-consult if we can be of further assitance

## 2018-10-09 NOTE — PLAN OF CARE
Problem: Patient Care Overview  Goal: Plan of Care/Patient Progress Review  Outcome: No Change  Pt. Is A&O x4, VSS, has PRN oxycodone for pain control, is NWB to RLE. RLE is CDI, connected to wound vac at 125mmHg continuous suction. Pt.'s scheduled dose of Novolog before meals increased to 10 units per provider order. BS+ hypoactive, LS clear bilaterally, pedal pulse on LLE found via doppler. Pt. Is now on moderated carb diet, tolerating well. Pt. Voids w/o difficulty using bedside urinal.

## 2018-10-10 LAB
GLUCOSE BLDC GLUCOMTR-MCNC: 116 MG/DL (ref 70–99)
GLUCOSE BLDC GLUCOMTR-MCNC: 150 MG/DL (ref 70–99)
GLUCOSE BLDC GLUCOMTR-MCNC: 150 MG/DL (ref 70–99)
GLUCOSE BLDC GLUCOMTR-MCNC: 156 MG/DL (ref 70–99)
GLUCOSE BLDC GLUCOMTR-MCNC: 186 MG/DL (ref 70–99)

## 2018-10-10 PROCEDURE — 25000132 ZZH RX MED GY IP 250 OP 250 PS 637: Mod: GY | Performed by: INTERNAL MEDICINE

## 2018-10-10 PROCEDURE — A9270 NON-COVERED ITEM OR SERVICE: HCPCS | Mod: GY | Performed by: INTERNAL MEDICINE

## 2018-10-10 PROCEDURE — 25000131 ZZH RX MED GY IP 250 OP 636 PS 637: Mod: GY | Performed by: INTERNAL MEDICINE

## 2018-10-10 PROCEDURE — 25000132 ZZH RX MED GY IP 250 OP 250 PS 637: Mod: GY | Performed by: SURGERY

## 2018-10-10 PROCEDURE — 00000146 ZZHCL STATISTIC GLUCOSE BY METER IP

## 2018-10-10 PROCEDURE — 25000128 H RX IP 250 OP 636: Performed by: INTERNAL MEDICINE

## 2018-10-10 PROCEDURE — 12000007 ZZH R&B INTERMEDIATE

## 2018-10-10 PROCEDURE — A9270 NON-COVERED ITEM OR SERVICE: HCPCS | Mod: GY | Performed by: SURGERY

## 2018-10-10 PROCEDURE — 99232 SBSQ HOSP IP/OBS MODERATE 35: CPT | Performed by: INTERNAL MEDICINE

## 2018-10-10 RX ORDER — PIPERACILLIN SODIUM, TAZOBACTAM SODIUM 3; .375 G/15ML; G/15ML
3.38 INJECTION, POWDER, LYOPHILIZED, FOR SOLUTION INTRAVENOUS EVERY 6 HOURS
Status: DISCONTINUED | OUTPATIENT
Start: 2018-10-10 | End: 2018-10-12

## 2018-10-10 RX ORDER — POLYETHYLENE GLYCOL 3350 17 G/17G
34 POWDER, FOR SOLUTION ORAL ONCE
Status: DISCONTINUED | OUTPATIENT
Start: 2018-10-10 | End: 2018-10-12

## 2018-10-10 RX ADMIN — CLOPIDOGREL 75 MG: 75 TABLET, FILM COATED ORAL at 08:32

## 2018-10-10 RX ADMIN — ATORVASTATIN CALCIUM 20 MG: 20 TABLET, FILM COATED ORAL at 08:32

## 2018-10-10 RX ADMIN — FUROSEMIDE 40 MG: 10 INJECTION, SOLUTION INTRAVENOUS at 17:09

## 2018-10-10 RX ADMIN — VITAMIN D, TAB 1000IU (100/BT) 1000 UNITS: 25 TAB at 08:32

## 2018-10-10 RX ADMIN — OXYCODONE HYDROCHLORIDE 5 MG: 5 TABLET ORAL at 14:54

## 2018-10-10 RX ADMIN — MICONAZOLE NITRATE: 2 POWDER TOPICAL at 08:33

## 2018-10-10 RX ADMIN — PIPERACILLIN SODIUM,TAZOBACTAM SODIUM 3.38 G: 3; .375 INJECTION, POWDER, FOR SOLUTION INTRAVENOUS at 20:14

## 2018-10-10 RX ADMIN — FUROSEMIDE 40 MG: 10 INJECTION, SOLUTION INTRAVENOUS at 08:31

## 2018-10-10 RX ADMIN — INSULIN ASPART 1 UNITS: 100 INJECTION, SOLUTION INTRAVENOUS; SUBCUTANEOUS at 18:08

## 2018-10-10 RX ADMIN — PIPERACILLIN SODIUM,TAZOBACTAM SODIUM 3.38 G: 3; .375 INJECTION, POWDER, FOR SOLUTION INTRAVENOUS at 08:33

## 2018-10-10 RX ADMIN — OXYCODONE HYDROCHLORIDE 5 MG: 5 TABLET ORAL at 20:15

## 2018-10-10 RX ADMIN — CITALOPRAM HYDROBROMIDE 10 MG: 10 TABLET ORAL at 08:32

## 2018-10-10 RX ADMIN — OXYCODONE HYDROCHLORIDE 5 MG: 5 TABLET ORAL at 05:13

## 2018-10-10 RX ADMIN — INSULIN ASPART 1 UNITS: 100 INJECTION, SOLUTION INTRAVENOUS; SUBCUTANEOUS at 08:39

## 2018-10-10 RX ADMIN — PANTOPRAZOLE SODIUM 40 MG: 40 TABLET, DELAYED RELEASE ORAL at 07:00

## 2018-10-10 RX ADMIN — FERROUS SULFATE TAB 325 MG (65 MG ELEMENTAL FE) 325 MG: 325 (65 FE) TAB at 08:32

## 2018-10-10 RX ADMIN — MICONAZOLE NITRATE: 2 POWDER TOPICAL at 17:09

## 2018-10-10 RX ADMIN — MELATONIN 5 MG TABLET 5 MG: at 22:00

## 2018-10-10 RX ADMIN — METOPROLOL SUCCINATE 100 MG: 100 TABLET, EXTENDED RELEASE ORAL at 20:14

## 2018-10-10 RX ADMIN — MICONAZOLE NITRATE: 2 POWDER TOPICAL at 22:01

## 2018-10-10 RX ADMIN — PANTOPRAZOLE SODIUM 40 MG: 40 TABLET, DELAYED RELEASE ORAL at 17:09

## 2018-10-10 RX ADMIN — OXYCODONE HYDROCHLORIDE 10 MG: 5 TABLET ORAL at 01:36

## 2018-10-10 RX ADMIN — SENNOSIDES 2 TABLET: 8.6 TABLET, FILM COATED ORAL at 08:32

## 2018-10-10 RX ADMIN — PIPERACILLIN SODIUM,TAZOBACTAM SODIUM 3.38 G: 3; .375 INJECTION, POWDER, FOR SOLUTION INTRAVENOUS at 14:11

## 2018-10-10 ASSESSMENT — ACTIVITIES OF DAILY LIVING (ADL)
ADLS_ACUITY_SCORE: 13
ADLS_ACUITY_SCORE: 14
ADLS_ACUITY_SCORE: 13
ADLS_ACUITY_SCORE: 13
ADLS_ACUITY_SCORE: 14
ADLS_ACUITY_SCORE: 13

## 2018-10-10 NOTE — PLAN OF CARE
Problem: Patient Care Overview  Goal: Plan of Care/Patient Progress Review  Outcome: No Change  Pt alert and oriented, up to chair with heavy assist of two, wound vac in place, pain well controled with oral pain medications, good urine output, passing flatus, did have BM today, will be npo for surgery Thursday.

## 2018-10-10 NOTE — PLAN OF CARE
Problem: Patient Care Overview  Goal: Plan of Care/Patient Progress Review  PT:  Orders received and chart reviewed. Per chart review, patient POD-2 from R BKA. Plan for revision of R BKA stump revision tomorrow per latest surgery note. Will wait to initiate PT evaluation until after R BKA revision.

## 2018-10-10 NOTE — PROGRESS NOTES
[facility]    Hospitalist Progress Note    Assessment & Plan   Tad Manning is a 77 year old male with DM type 2 and peripheral neuropathy, who was admitted on 9/30/2018 with osteomyelitis right foot:    Impression:   Principal Problem:    Osteomyelitis Right Foot -- S/P foot amp 10/8/18   -- awaiting final BKA (once infection better)  Active Problems:    DM type 2 w neuropathy -- Hgb A1C 9.0 on 9/22/18    HTN (hypertension)    CAD -- S/P CABG    Osteomyelitis left 2nd Toe s/p amputaion 1/10/14    CRF (chronic renal failure), stage 3 (moderate) (H)    Chronic systolic heart failure (H)    Atrial fibrillation -- on Warfarin    Hyponatremia -- corrected     Constipation      Plan: Continue IV Zosyn, right BKA planned tomorrow.   Miralax to help move bowels.     DVT Prophylaxis: Pneumatic Compression Devices  Code Status: Full Code    Disposition: Expected discharge in 4 days, once recovers from BKA, will need TCU.     Mikael Thomas MD  Pager [phone]  Cell Phone [phone]  Text Page (7am to 6pm)    Interval History   Right foot amputation 2 days ago, then BKA tomorrow.  No BM for 3 days.      Physical Exam   Temp: 98  F (36.7  C) Temp src: Oral BP: 133/64 Pulse: 68 Heart Rate: 73 Resp: 17 SpO2: 95 % O2 Device: None (Room air)    Vitals:    10/07/18 0310 10/09/18 0600 10/10/18 0244   Weight: 79.4 kg (175 lb 0.7 oz) 82.6 kg (182 lb 1.6 oz) 82.1 kg (181 lb)     Vital Signs with Ranges  Temp:  [98  F (36.7  C)-99.4  F (37.4  C)] 98  F (36.7  C)  Pulse:  [65-68] 68  Heart Rate:  [60-73] 73  Resp:  [16-19] 17  BP: ()/(46-64) 133/64  SpO2:  [94 %-98 %] 95 %  I/O last 3 completed shifts:  In: 1200 [P.O.:1200]  Out: 2150 [Urine:2150]    # Pain Assessment:  Current Pain Score 10/10/2018   Patient currently in pain? sleeping: patient not able to self report   Pain score (0-10) -   Pain location -   Pain descriptors -   Tad s pain level was assessed and he currently denies pain.         Constitutional: Awake, alert, cooperative, no apparent distress  Respiratory: Clear to auscultation bilaterally, no crackles or wheezing  Cardiovascular: Regular rate and rhythm, normal S1 and S2, and no murmur noted  GI: Normal bowel sounds, soft, non-distended, non-tender  Extrem: No calf tenderness, no ankle edema, right foot amputation with stump covered with clear dressing.   Neuro: Ox3, no focal motor deficits, feet numb to level of just above ankle on left.     Medications       atorvastatin  20 mg Oral Daily     cholecalciferol  1,000 Units Oral Daily     citalopram (celeXA) tablet 10 mg  10 mg Oral Daily     clopidogrel  75 mg Oral Daily     ferrous sulfate  325 mg Oral Daily     furosemide  40 mg Intravenous BID     insulin aspart  10 Units Subcutaneous TID w/meals     insulin aspart  1-7 Units Subcutaneous TID AC     insulin aspart  1-5 Units Subcutaneous At Bedtime     insulin glargine  16 Units Subcutaneous At Bedtime     melatonin tablet 5 mg  5 mg Oral At Bedtime     metoprolol succinate  100 mg Oral Daily     miconazole   Topical TID     pantoprazole  40 mg Oral BID AC     piperacillin-tazobactam  3.375 g Intravenous Q6H     sennosides  2 tablet Oral BID     sodium chloride (PF)  3 mL Intracatheter Q8H       Data     Recent Labs  Lab 10/08/18  0812 10/07/18  1325 10/06/18  2325 10/06/18  1012  10/04/18  0839   WBC 4.5 6.4  --  5.8  < > 6.3   HGB 9.5* 10.2*  --  9.7*  < > 9.3*   MCV 89 90  --  88  < > 87   * 154  --  132*  < > 103*   INR 1.20*  --   --   --   --  1.37*    135  --  136  < > 136   POTASSIUM 3.5 3.7 3.7 3.3*  < > 3.6   CHLORIDE 103 100  --  103  < > 104   CO2 26 27  --  25  < > 22   BUN 19 20  --  19  < > 17   CR 1.21 1.35*  --  1.20  < > 1.14   ANIONGAP 7 8  --  8  < > 10   JUSTINO 8.7 8.8  --  8.2*  < > 7.9*   * 232*  --  170*  < > 159*   < > = values in this interval not displayed.    Imaging:   No results found for this or any previous visit (from the past 24  hour(s)).

## 2018-10-10 NOTE — PLAN OF CARE
Problem: Patient Care Overview  Goal: Plan of Care/Patient Progress Review  Outcome: No Change  Vital Signs stable. Telemetry normal sinus with BBB. Alert and Oriented.Lung sounds clear but diminished in the bases, IS to 1500. Bowel sounds hypoactive. Passing flatus. Moderate carb diet. Denies nausea. Adequate urinary output via urinal. CMS to baseline, pt has baseline numbness and tingling in all extremities. Wound vac in place to -125 suction to right lower extremity. Dressing is clean dry and intact. Up with assist of 2. Pain controlled with Oxycodone. Slept well during the night.

## 2018-10-10 NOTE — PROGRESS NOTES
VASCULAR SURGERY PROGRESS NOTE    S:  Feels fine.  Sometimes has a shooting pain through his leg.  Well-controlled on current regimen.    O:  /65  Pulse 68  Temp 98.2  F (36.8  C) (Oral)  Resp 16  Ht 1.829 m (6')  Wt 82.1 kg (181 lb)  SpO2 97%  BMI 24.55 kg/m2  General: A&O, NAD.  Extremities:  Wound vac in place and functioning well.  Distal stump warm without erythema.  Left foot is warm with 1+ distal pulses.    Blood cultures negative.    A:  POD#2 s/p right guillotine BKA for osteomyelitis of the foot.  Doing well.    P:  Will revise BKA stump tomorrow.    Anival Mcgarry MD  General surgery resident  [phone]    I agree with the above.  Plan closure of right BKA tomorrow at 1030.    Solis Vick MD

## 2018-10-10 NOTE — PLAN OF CARE
Problem: Patient Care Overview  Goal: Plan of Care/Patient Progress Review  Outcome: Improving  VSS. Regular diet. Pain 6/10 oxycodone given with relief. Wound vac in place -125 suction. Dressings clean dry and intact. Up with assist 2 pivot to chair. Reposition q2h.

## 2018-10-11 ENCOUNTER — ANESTHESIA EVENT (OUTPATIENT)
Dept: SURGERY | Facility: CLINIC | Age: 78
DRG: 854 | End: 2018-10-11
Payer: MEDICARE

## 2018-10-11 ENCOUNTER — ANESTHESIA (OUTPATIENT)
Dept: SURGERY | Facility: CLINIC | Age: 78
DRG: 854 | End: 2018-10-11
Payer: MEDICARE

## 2018-10-11 ENCOUNTER — OFFICE VISIT (OUTPATIENT)
Dept: SURGERY | Facility: PHYSICIAN GROUP | Age: 78
End: 2018-10-11
Payer: MEDICARE

## 2018-10-11 LAB
BASOPHILS # BLD AUTO: 0 10E9/L (ref 0–0.2)
BASOPHILS NFR BLD AUTO: 0.2 %
DIFFERENTIAL METHOD BLD: ABNORMAL
EOSINOPHIL # BLD AUTO: 0.1 10E9/L (ref 0–0.7)
EOSINOPHIL NFR BLD AUTO: 0.5 %
ERYTHROCYTE [DISTWIDTH] IN BLOOD BY AUTOMATED COUNT: 16.1 % (ref 10–15)
GLUCOSE BLDC GLUCOMTR-MCNC: 128 MG/DL (ref 70–99)
GLUCOSE BLDC GLUCOMTR-MCNC: 141 MG/DL (ref 70–99)
GLUCOSE BLDC GLUCOMTR-MCNC: 143 MG/DL (ref 70–99)
GLUCOSE BLDC GLUCOMTR-MCNC: 145 MG/DL (ref 70–99)
HCT VFR BLD AUTO: 23.5 % (ref 40–53)
HGB BLD-MCNC: 7.8 G/DL (ref 13.3–17.7)
HGB BLD-MCNC: 8.4 G/DL (ref 13.3–17.7)
IMM GRANULOCYTES # BLD: 0 10E9/L (ref 0–0.4)
IMM GRANULOCYTES NFR BLD: 0.3 %
LYMPHOCYTES # BLD AUTO: 1.2 10E9/L (ref 0.8–5.3)
LYMPHOCYTES NFR BLD AUTO: 13.5 %
MCH RBC QN AUTO: 29.8 PG (ref 26.5–33)
MCHC RBC AUTO-ENTMCNC: 33.2 G/DL (ref 31.5–36.5)
MCV RBC AUTO: 90 FL (ref 78–100)
MONOCYTES # BLD AUTO: 0.7 10E9/L (ref 0–1.3)
MONOCYTES NFR BLD AUTO: 7.1 %
NEUTROPHILS # BLD AUTO: 7.2 10E9/L (ref 1.6–8.3)
NEUTROPHILS NFR BLD AUTO: 78.4 %
NRBC # BLD AUTO: 0 10*3/UL
NRBC BLD AUTO-RTO: 0 /100
PLATELET # BLD AUTO: 137 10E9/L (ref 150–450)
PLATELET # BLD AUTO: 152 10E9/L (ref 150–450)
POTASSIUM SERPL-SCNC: 4.1 MMOL/L (ref 3.4–5.3)
RBC # BLD AUTO: 2.62 10E12/L (ref 4.4–5.9)
WBC # BLD AUTO: 9.1 10E9/L (ref 4–11)

## 2018-10-11 PROCEDURE — 86923 COMPATIBILITY TEST ELECTRIC: CPT | Performed by: STUDENT IN AN ORGANIZED HEALTH CARE EDUCATION/TRAINING PROGRAM

## 2018-10-11 PROCEDURE — 36415 COLL VENOUS BLD VENIPUNCTURE: CPT | Performed by: STUDENT IN AN ORGANIZED HEALTH CARE EDUCATION/TRAINING PROGRAM

## 2018-10-11 PROCEDURE — 36000056 ZZH SURGERY LEVEL 3 1ST 30 MIN: Performed by: SURGERY

## 2018-10-11 PROCEDURE — 25000132 ZZH RX MED GY IP 250 OP 250 PS 637: Mod: GY | Performed by: INTERNAL MEDICINE

## 2018-10-11 PROCEDURE — 25000128 H RX IP 250 OP 636: Performed by: NURSE ANESTHETIST, CERTIFIED REGISTERED

## 2018-10-11 PROCEDURE — 00000146 ZZHCL STATISTIC GLUCOSE BY METER IP

## 2018-10-11 PROCEDURE — 37000008 ZZH ANESTHESIA TECHNICAL FEE, 1ST 30 MIN: Performed by: SURGERY

## 2018-10-11 PROCEDURE — P9041 ALBUMIN (HUMAN),5%, 50ML: HCPCS | Performed by: NURSE ANESTHETIST, CERTIFIED REGISTERED

## 2018-10-11 PROCEDURE — 86850 RBC ANTIBODY SCREEN: CPT | Performed by: STUDENT IN AN ORGANIZED HEALTH CARE EDUCATION/TRAINING PROGRAM

## 2018-10-11 PROCEDURE — 25000128 H RX IP 250 OP 636: Performed by: ANESTHESIOLOGY

## 2018-10-11 PROCEDURE — 84132 ASSAY OF SERUM POTASSIUM: CPT | Performed by: SURGERY

## 2018-10-11 PROCEDURE — 99232 SBSQ HOSP IP/OBS MODERATE 35: CPT | Performed by: INTERNAL MEDICINE

## 2018-10-11 PROCEDURE — 25000125 ZZHC RX 250: Performed by: SURGERY

## 2018-10-11 PROCEDURE — 25000566 ZZH SEVOFLURANE, EA 15 MIN: Performed by: SURGERY

## 2018-10-11 PROCEDURE — 71000013 ZZH RECOVERY PHASE 1 LEVEL 1 EA ADDTL HR: Performed by: SURGERY

## 2018-10-11 PROCEDURE — A9270 NON-COVERED ITEM OR SERVICE: HCPCS | Mod: GY | Performed by: INTERNAL MEDICINE

## 2018-10-11 PROCEDURE — 25000128 H RX IP 250 OP 636: Performed by: INTERNAL MEDICINE

## 2018-10-11 PROCEDURE — 0QBG0ZZ EXCISION OF RIGHT TIBIA, OPEN APPROACH: ICD-10-PCS | Performed by: SURGERY

## 2018-10-11 PROCEDURE — 71000012 ZZH RECOVERY PHASE 1 LEVEL 1 FIRST HR: Performed by: SURGERY

## 2018-10-11 PROCEDURE — 86901 BLOOD TYPING SEROLOGIC RH(D): CPT | Performed by: STUDENT IN AN ORGANIZED HEALTH CARE EDUCATION/TRAINING PROGRAM

## 2018-10-11 PROCEDURE — 25000125 ZZHC RX 250: Performed by: NURSE ANESTHETIST, CERTIFIED REGISTERED

## 2018-10-11 PROCEDURE — 12000007 ZZH R&B INTERMEDIATE

## 2018-10-11 PROCEDURE — 25000132 ZZH RX MED GY IP 250 OP 250 PS 637: Mod: GY | Performed by: SURGERY

## 2018-10-11 PROCEDURE — 36415 COLL VENOUS BLD VENIPUNCTURE: CPT | Performed by: SURGERY

## 2018-10-11 PROCEDURE — 85018 HEMOGLOBIN: CPT | Performed by: SURGERY

## 2018-10-11 PROCEDURE — L5450 POSTOP APP NON-WGT BEAR DSG: HCPCS

## 2018-10-11 PROCEDURE — 88307 TISSUE EXAM BY PATHOLOGIST: CPT | Mod: 26 | Performed by: SURGERY

## 2018-10-11 PROCEDURE — 27210794 ZZH OR GENERAL SUPPLY STERILE: Performed by: SURGERY

## 2018-10-11 PROCEDURE — 36000058 ZZH SURGERY LEVEL 3 EA 15 ADDTL MIN: Performed by: SURGERY

## 2018-10-11 PROCEDURE — E2402 NEG PRESS WOUND THERAPY PUMP: HCPCS

## 2018-10-11 PROCEDURE — A9270 NON-COVERED ITEM OR SERVICE: HCPCS | Mod: GY | Performed by: SURGERY

## 2018-10-11 PROCEDURE — 25000131 ZZH RX MED GY IP 250 OP 636 PS 637: Mod: GY | Performed by: INTERNAL MEDICINE

## 2018-10-11 PROCEDURE — 37000009 ZZH ANESTHESIA TECHNICAL FEE, EACH ADDTL 15 MIN: Performed by: SURGERY

## 2018-10-11 PROCEDURE — 85025 COMPLETE CBC W/AUTO DIFF WBC: CPT | Performed by: SURGERY

## 2018-10-11 PROCEDURE — 27886 AMPUTATION FOLLOW-UP SURGERY: CPT | Mod: RT | Performed by: SURGERY

## 2018-10-11 PROCEDURE — 88307 TISSUE EXAM BY PATHOLOGIST: CPT | Performed by: SURGERY

## 2018-10-11 PROCEDURE — 85049 AUTOMATED PLATELET COUNT: CPT | Performed by: SURGERY

## 2018-10-11 PROCEDURE — 86900 BLOOD TYPING SEROLOGIC ABO: CPT | Performed by: STUDENT IN AN ORGANIZED HEALTH CARE EDUCATION/TRAINING PROGRAM

## 2018-10-11 PROCEDURE — 25000128 H RX IP 250 OP 636: Performed by: STUDENT IN AN ORGANIZED HEALTH CARE EDUCATION/TRAINING PROGRAM

## 2018-10-11 PROCEDURE — 0QBJ0ZZ EXCISION OF RIGHT FIBULA, OPEN APPROACH: ICD-10-PCS | Performed by: SURGERY

## 2018-10-11 PROCEDURE — 40000169 ZZH STATISTIC PRE-PROCEDURE ASSESSMENT I: Performed by: SURGERY

## 2018-10-11 RX ORDER — SODIUM CHLORIDE, SODIUM LACTATE, POTASSIUM CHLORIDE, CALCIUM CHLORIDE 600; 310; 30; 20 MG/100ML; MG/100ML; MG/100ML; MG/100ML
INJECTION, SOLUTION INTRAVENOUS CONTINUOUS
Status: DISCONTINUED | OUTPATIENT
Start: 2018-10-11 | End: 2018-10-11 | Stop reason: HOSPADM

## 2018-10-11 RX ORDER — FENTANYL CITRATE 50 UG/ML
25-50 INJECTION, SOLUTION INTRAMUSCULAR; INTRAVENOUS
Status: DISCONTINUED | OUTPATIENT
Start: 2018-10-11 | End: 2018-10-11 | Stop reason: HOSPADM

## 2018-10-11 RX ORDER — NALOXONE HYDROCHLORIDE 0.4 MG/ML
.1-.4 INJECTION, SOLUTION INTRAMUSCULAR; INTRAVENOUS; SUBCUTANEOUS
Status: DISCONTINUED | OUTPATIENT
Start: 2018-10-11 | End: 2018-10-11

## 2018-10-11 RX ORDER — EPHEDRINE SULFATE 50 MG/ML
INJECTION, SOLUTION INTRAMUSCULAR; INTRAVENOUS; SUBCUTANEOUS PRN
Status: DISCONTINUED | OUTPATIENT
Start: 2018-10-11 | End: 2018-10-11

## 2018-10-11 RX ORDER — ONDANSETRON 4 MG/1
4 TABLET, ORALLY DISINTEGRATING ORAL EVERY 30 MIN PRN
Status: DISCONTINUED | OUTPATIENT
Start: 2018-10-11 | End: 2018-10-11 | Stop reason: HOSPADM

## 2018-10-11 RX ORDER — LIDOCAINE HYDROCHLORIDE 20 MG/ML
INJECTION, SOLUTION INFILTRATION; PERINEURAL PRN
Status: DISCONTINUED | OUTPATIENT
Start: 2018-10-11 | End: 2018-10-11

## 2018-10-11 RX ORDER — OXYCODONE HYDROCHLORIDE 5 MG/1
10 TABLET ORAL ONCE
Status: COMPLETED | OUTPATIENT
Start: 2018-10-11 | End: 2018-10-11

## 2018-10-11 RX ORDER — PROPOFOL 10 MG/ML
INJECTION, EMULSION INTRAVENOUS PRN
Status: DISCONTINUED | OUTPATIENT
Start: 2018-10-11 | End: 2018-10-11

## 2018-10-11 RX ORDER — ONDANSETRON 2 MG/ML
4 INJECTION INTRAMUSCULAR; INTRAVENOUS EVERY 30 MIN PRN
Status: DISCONTINUED | OUTPATIENT
Start: 2018-10-11 | End: 2018-10-11 | Stop reason: HOSPADM

## 2018-10-11 RX ORDER — HYDROMORPHONE HYDROCHLORIDE 1 MG/ML
.3-.5 INJECTION, SOLUTION INTRAMUSCULAR; INTRAVENOUS; SUBCUTANEOUS EVERY 5 MIN PRN
Status: DISCONTINUED | OUTPATIENT
Start: 2018-10-11 | End: 2018-10-11 | Stop reason: HOSPADM

## 2018-10-11 RX ORDER — FUROSEMIDE 40 MG
40 TABLET ORAL DAILY
Status: DISCONTINUED | OUTPATIENT
Start: 2018-10-12 | End: 2018-10-16 | Stop reason: HOSPADM

## 2018-10-11 RX ORDER — SODIUM CHLORIDE 9 MG/ML
INJECTION, SOLUTION INTRAVENOUS CONTINUOUS
Status: DISCONTINUED | OUTPATIENT
Start: 2018-10-11 | End: 2018-10-16 | Stop reason: HOSPADM

## 2018-10-11 RX ORDER — CEFAZOLIN SODIUM 1 G/3ML
1 INJECTION, POWDER, FOR SOLUTION INTRAMUSCULAR; INTRAVENOUS SEE ADMIN INSTRUCTIONS
Status: DISCONTINUED | OUTPATIENT
Start: 2018-10-11 | End: 2018-10-11 | Stop reason: HOSPADM

## 2018-10-11 RX ORDER — FENTANYL CITRATE 50 UG/ML
INJECTION, SOLUTION INTRAMUSCULAR; INTRAVENOUS PRN
Status: DISCONTINUED | OUTPATIENT
Start: 2018-10-11 | End: 2018-10-11

## 2018-10-11 RX ORDER — ONDANSETRON 2 MG/ML
INJECTION INTRAMUSCULAR; INTRAVENOUS PRN
Status: DISCONTINUED | OUTPATIENT
Start: 2018-10-11 | End: 2018-10-11

## 2018-10-11 RX ORDER — ALBUMIN, HUMAN INJ 5% 5 %
SOLUTION INTRAVENOUS CONTINUOUS PRN
Status: DISCONTINUED | OUTPATIENT
Start: 2018-10-11 | End: 2018-10-11

## 2018-10-11 RX ORDER — CEFAZOLIN SODIUM 2 G/100ML
2 INJECTION, SOLUTION INTRAVENOUS
Status: DISCONTINUED | OUTPATIENT
Start: 2018-10-11 | End: 2018-10-11 | Stop reason: HOSPADM

## 2018-10-11 RX ORDER — MAGNESIUM HYDROXIDE 1200 MG/15ML
LIQUID ORAL PRN
Status: DISCONTINUED | OUTPATIENT
Start: 2018-10-11 | End: 2018-10-11 | Stop reason: HOSPADM

## 2018-10-11 RX ORDER — IBUPROFEN 600 MG/1
600 TABLET, FILM COATED ORAL EVERY 8 HOURS PRN
Status: DISCONTINUED | OUTPATIENT
Start: 2018-10-11 | End: 2018-10-16 | Stop reason: HOSPADM

## 2018-10-11 RX ORDER — NALOXONE HYDROCHLORIDE 0.4 MG/ML
.1-.4 INJECTION, SOLUTION INTRAMUSCULAR; INTRAVENOUS; SUBCUTANEOUS
Status: DISCONTINUED | OUTPATIENT
Start: 2018-10-11 | End: 2018-10-16 | Stop reason: HOSPADM

## 2018-10-11 RX ADMIN — PIPERACILLIN SODIUM,TAZOBACTAM SODIUM 3.38 G: 3; .375 INJECTION, POWDER, FOR SOLUTION INTRAVENOUS at 08:52

## 2018-10-11 RX ADMIN — HYDROMORPHONE HYDROCHLORIDE 0.5 MG: 1 INJECTION, SOLUTION INTRAMUSCULAR; INTRAVENOUS; SUBCUTANEOUS at 14:28

## 2018-10-11 RX ADMIN — HYDROMORPHONE HYDROCHLORIDE 0.5 MG: 1 INJECTION, SOLUTION INTRAMUSCULAR; INTRAVENOUS; SUBCUTANEOUS at 14:18

## 2018-10-11 RX ADMIN — ALBUMIN (HUMAN): 12.5 SOLUTION INTRAVENOUS at 12:35

## 2018-10-11 RX ADMIN — PHENYLEPHRINE HYDROCHLORIDE 100 MCG: 10 INJECTION, SOLUTION INTRAMUSCULAR; INTRAVENOUS; SUBCUTANEOUS at 13:15

## 2018-10-11 RX ADMIN — METOPROLOL SUCCINATE 100 MG: 100 TABLET, EXTENDED RELEASE ORAL at 21:09

## 2018-10-11 RX ADMIN — FENTANYL CITRATE 25 MCG: 50 INJECTION, SOLUTION INTRAMUSCULAR; INTRAVENOUS at 11:44

## 2018-10-11 RX ADMIN — OXYCODONE HYDROCHLORIDE 10 MG: 5 TABLET ORAL at 18:03

## 2018-10-11 RX ADMIN — PHENYLEPHRINE HYDROCHLORIDE 100 MCG: 10 INJECTION, SOLUTION INTRAMUSCULAR; INTRAVENOUS; SUBCUTANEOUS at 12:51

## 2018-10-11 RX ADMIN — ACETAMINOPHEN 650 MG: 325 TABLET, FILM COATED ORAL at 19:57

## 2018-10-11 RX ADMIN — HYDROMORPHONE HYDROCHLORIDE 0.25 MG: 1 INJECTION, SOLUTION INTRAMUSCULAR; INTRAVENOUS; SUBCUTANEOUS at 13:02

## 2018-10-11 RX ADMIN — PHENYLEPHRINE HYDROCHLORIDE 50 MCG: 10 INJECTION, SOLUTION INTRAMUSCULAR; INTRAVENOUS; SUBCUTANEOUS at 10:49

## 2018-10-11 RX ADMIN — FENTANYL CITRATE 50 MCG: 50 INJECTION, SOLUTION INTRAMUSCULAR; INTRAVENOUS at 15:06

## 2018-10-11 RX ADMIN — Medication 5 MG: at 11:15

## 2018-10-11 RX ADMIN — PROPOFOL 70 MG: 10 INJECTION, EMULSION INTRAVENOUS at 10:47

## 2018-10-11 RX ADMIN — OXYCODONE HYDROCHLORIDE 10 MG: 5 TABLET ORAL at 01:42

## 2018-10-11 RX ADMIN — Medication 5 MG: at 11:53

## 2018-10-11 RX ADMIN — SENNOSIDES 1 TABLET: 8.6 TABLET, FILM COATED ORAL at 21:09

## 2018-10-11 RX ADMIN — SODIUM CHLORIDE, POTASSIUM CHLORIDE, SODIUM LACTATE AND CALCIUM CHLORIDE: 600; 310; 30; 20 INJECTION, SOLUTION INTRAVENOUS at 13:21

## 2018-10-11 RX ADMIN — PANTOPRAZOLE SODIUM 40 MG: 40 TABLET, DELAYED RELEASE ORAL at 16:37

## 2018-10-11 RX ADMIN — HYDROMORPHONE HYDROCHLORIDE 0.5 MG: 1 INJECTION, SOLUTION INTRAMUSCULAR; INTRAVENOUS; SUBCUTANEOUS at 14:56

## 2018-10-11 RX ADMIN — Medication 5 MG: at 12:53

## 2018-10-11 RX ADMIN — INSULIN ASPART 2 UNITS: 100 INJECTION, SOLUTION INTRAVENOUS; SUBCUTANEOUS at 17:52

## 2018-10-11 RX ADMIN — PIPERACILLIN SODIUM,TAZOBACTAM SODIUM 3.38 G: 3; .375 INJECTION, POWDER, FOR SOLUTION INTRAVENOUS at 01:34

## 2018-10-11 RX ADMIN — MICONAZOLE NITRATE: 2 POWDER TOPICAL at 21:19

## 2018-10-11 RX ADMIN — PHENYLEPHRINE HYDROCHLORIDE 150 MCG: 10 INJECTION, SOLUTION INTRAMUSCULAR; INTRAVENOUS; SUBCUTANEOUS at 12:40

## 2018-10-11 RX ADMIN — Medication 5 MG: at 11:50

## 2018-10-11 RX ADMIN — FENTANYL CITRATE 50 MCG: 50 INJECTION, SOLUTION INTRAMUSCULAR; INTRAVENOUS at 10:47

## 2018-10-11 RX ADMIN — PIPERACILLIN SODIUM,TAZOBACTAM SODIUM 3.38 G: 3; .375 INJECTION, POWDER, FOR SOLUTION INTRAVENOUS at 20:09

## 2018-10-11 RX ADMIN — HYDROMORPHONE HYDROCHLORIDE 0.25 MG: 1 INJECTION, SOLUTION INTRAMUSCULAR; INTRAVENOUS; SUBCUTANEOUS at 13:34

## 2018-10-11 RX ADMIN — Medication 5 MG: at 13:28

## 2018-10-11 RX ADMIN — SODIUM CHLORIDE, POTASSIUM CHLORIDE, SODIUM LACTATE AND CALCIUM CHLORIDE: 600; 310; 30; 20 INJECTION, SOLUTION INTRAVENOUS at 09:50

## 2018-10-11 RX ADMIN — FENTANYL CITRATE 25 MCG: 50 INJECTION, SOLUTION INTRAMUSCULAR; INTRAVENOUS at 11:34

## 2018-10-11 RX ADMIN — ONDANSETRON 4 MG: 2 INJECTION INTRAMUSCULAR; INTRAVENOUS at 13:34

## 2018-10-11 RX ADMIN — LIDOCAINE HYDROCHLORIDE 100 MG: 20 INJECTION, SOLUTION INFILTRATION; PERINEURAL at 10:47

## 2018-10-11 RX ADMIN — OXYCODONE HYDROCHLORIDE 10 MG: 5 TABLET ORAL at 21:08

## 2018-10-11 RX ADMIN — INSULIN GLARGINE 16 UNITS: 100 INJECTION, SOLUTION SUBCUTANEOUS at 21:32

## 2018-10-11 RX ADMIN — MELATONIN 5 MG TABLET 5 MG: at 21:34

## 2018-10-11 RX ADMIN — HYDROMORPHONE HYDROCHLORIDE 0.4 MG: 1 INJECTION, SOLUTION INTRAMUSCULAR; INTRAVENOUS; SUBCUTANEOUS at 16:27

## 2018-10-11 RX ADMIN — OXYCODONE HYDROCHLORIDE 10 MG: 5 TABLET ORAL at 19:57

## 2018-10-11 ASSESSMENT — ACTIVITIES OF DAILY LIVING (ADL)
ADLS_ACUITY_SCORE: 13

## 2018-10-11 ASSESSMENT — ENCOUNTER SYMPTOMS: DYSRHYTHMIAS: 1

## 2018-10-11 ASSESSMENT — LIFESTYLE VARIABLES: TOBACCO_USE: 1

## 2018-10-11 NOTE — ANESTHESIA CARE TRANSFER NOTE
Patient: Tad Manning    Procedure(s):  CLOSURE RIGHT BELOW KNEE AMPUTATION - Wound Class: III-Contaminated    Diagnosis: DIABETIC RIGHT FOOT WITH OSTEOMYLETIS  Diagnosis Additional Information: No value filed.    Anesthesia Type:   General, LMA     Note:    Patient transferred to:PACU        Vitals: (Last set prior to Anesthesia Care Transfer)    CRNA VITALS  10/11/2018 1334 - 10/11/2018 1414      10/11/2018             Resp Rate (set): 10                Electronically Signed By: OFELIA Walters CRNA  October 11, 2018  2:14 PM

## 2018-10-11 NOTE — PROGRESS NOTES
S:  We received an EPIC order for a Rooke Rigid Protector for the Main OR.   O: I dropped off and obtained a signature at the  of the Main OR.  A:  Patient will fit by a staff member in the main OR following amputation.    P: Pt will wear the Protector starting today following Physician orders.  G: Protect R amputation site.    Dipak [employer] BK Stump Protector Instructions

## 2018-10-11 NOTE — PROGRESS NOTES
VASCULAR SURGERY PROGRESS NOTE    S:  Pain well controlled.  Remains on pip/tazo.  Had a BM.    O:  Blood pressure 120/69, pulse 65, temperature 99.5  F (37.5  C), temperature source Temporal, resp. rate 20, height 1.829 m (6'), weight 81.6 kg (179 lb 14.3 oz), SpO2 96 %.  General: A&O, NAD.  Extremities:  Wound vac functioning well.  Surrounding skin without erythema.    Hgb 8.4    A:  POD#3 s/p guillotine BKA for osteomyelitis of the foot.  Doing well.    P:  Revision and closure of BKA today.    Anival Mcgarry MD  General surgery resident  [phone]

## 2018-10-11 NOTE — BRIEF OP NOTE
[facility] [place] Brief Operative Note    Pre-operative diagnosis: Right lower leg amputation for non-healing ulcer    Post-operative diagnosis Right lower extremity below knee amputation revision and closure    Procedure: Procedure(s):  CLOSURE RIGHT BELOW KNEE AMPUTATION - Wound Class: III-Contaminated   Surgeon(s): Surgeon(s) and Role:     * Amador Vick MD - Primary     * Sam Silva MD - Assisting   Estimated blood loss: 200 mL    Specimens:   ID Type Source Tests Collected by Time Destination   A : REMNANT RIGHT LOWER LEG Tissue Leg Lower, Right SURGICAL PATHOLOGY EXAM Amador Vick MD 10/11/2018  1:34 PM       Findings: Follow up with Hbg post-op. Please transfuse for Hbg<8.0

## 2018-10-11 NOTE — ANESTHESIA CARE TRANSFER NOTE
Patient: Tad Manning    Procedure(s):  CLOSURE RIGHT BELOW KNEE AMPUTATION - Wound Class: III-Contaminated    Diagnosis: DIABETIC RIGHT FOOT WITH OSTEOMYLETIS  Diagnosis Additional Information: No value filed.    Anesthesia Type:   General, LMA     Note:      Comments: LMA out when awake. To PACU with O2.      Vitals: (Last set prior to Anesthesia Care Transfer)    CRNA VITALS  10/11/2018 1334 - 10/11/2018 1413      10/11/2018             Resp Rate (set): 10                Electronically Signed By: OFELIA Walters CRNA  October 11, 2018  2:13 PM

## 2018-10-11 NOTE — PLAN OF CARE
Problem: Patient Care Overview  Goal: Plan of Care/Patient Progress Review  Outcome: Improving  A&O. VSS on RA. NPO. Pain controlled with oxy. Wound vac in place -125 suction. Dressings clean dry and intact. Up with assist 2, pivot to chair. BS stable, no coverage given. Tele- SR with BBB. Plan for closure today at 1030. Continue to monitor.

## 2018-10-11 NOTE — PROGRESS NOTES
Report given to Leilani GARCIA in preop. Reported pt has been npo since midnight. Wound vac patent to right foot at 125 mmhg blood sugar 143 this morning pt states family aware he is going back to surgery this morning. Hover mat placed under patient by nursing assistant and pt taken via cart to pre-op

## 2018-10-11 NOTE — PROGRESS NOTES
[facility]    Hospitalist Progress Note    Assessment & Plan   Tad Manning is a 77 year old male with DM type 2 and peripheral neuropathy, who was admitted on 9/30/2018 with osteomyelitis right foot:    Impression:   Principal Problem:    Osteomyelitis Right Foot -- S/P foot amp 10/8/18   -- awaiting final BKA (once infection better)  Active Problems:    DM type 2 w neuropathy -- Hgb A1C 9.0 on 9/22/18    HTN (hypertension)    CAD -- S/P CABG    Osteomyelitis left 2nd Toe s/p amputaion 1/10/14    CRF (chronic renal failure), stage 3 (moderate) (H)    Chronic systolic heart failure (H)    Atrial fibrillation -- on Warfarin    Hyponatremia -- corrected     Constipation -- no BM x 4 days      Plan: Continue IV Zosyn, right BKA this AM, will move bowels after surgery (Miralax and Senokot).     DVT Prophylaxis: Pneumatic Compression Devices  Code Status: Full Code    Disposition: Expected discharge in 3 days, once recovers from BKA, will need TCU.     Mikael Thomas MD  Pager [phone]  Cell Phone [phone]  Text Page (7am to 6pm)    Interval History   Right BKA planned today, no BM yet.       Physical Exam   Temp: 99.5  F (37.5  C) Temp src: Temporal BP: 120/69 Pulse: 65 Heart Rate: 60 Resp: 20 SpO2: 96 % O2 Device: None (Room air)    Vitals:    10/09/18 0600 10/10/18 0244 10/11/18 0548   Weight: 82.6 kg (182 lb 1.6 oz) 82.1 kg (181 lb) 81.6 kg (179 lb 14.3 oz)     Vital Signs with Ranges  Temp:  [97.3  F (36.3  C)-99.5  F (37.5  C)] 99.5  F (37.5  C)  Pulse:  [65-68] 65  Heart Rate:  [60-72] 60  Resp:  [16-20] 20  BP: (120-141)/(56-69) 120/69  SpO2:  [96 %-99 %] 96 %  I/O last 3 completed shifts:  In: 1280 [P.O.:1080; I.V.:200]  Out: 1925 [Urine:1925]    # Pain Assessment:  Current Pain Score 10/11/2018   Patient currently in pain? denies   Pain score (0-10) -   Pain location -   Pain descriptors -   Tad s pain level was assessed and he currently denies pain.         Constitutional: Awake, alert, cooperative, no apparent distress  Respiratory: Clear to auscultation bilaterally, no crackles or wheezing  Cardiovascular: Regular rate and rhythm, normal S1 and S2, and no murmur noted  GI: Normal bowel sounds, soft, non-distended, non-tender  Extrem: No calf tenderness, no ankle edema, right foot amputation with stump covered with clear dressing.   Neuro: Ox3, no focal motor deficits, feet numb to level of just above ankle on left.     Medications     lactated ringers 25 mL/hr at 10/11/18 0950     Patient RECEIVING antibiotic to treat a different condition and it provides ADEQUATE COVERAGE for this surgical procedure.         [Auto Hold] atorvastatin  20 mg Oral Daily     ceFAZolin  1 g Intravenous See Admin Instructions     ceFAZolin  2 g Intravenous Pre-Op/Pre-procedure x 1 dose     [Auto Hold] cholecalciferol  1,000 Units Oral Daily     [Auto Hold] citalopram (celeXA) tablet 10 mg  10 mg Oral Daily     [Auto Hold] clopidogrel  75 mg Oral Daily     [Auto Hold] ferrous sulfate  325 mg Oral Daily     [Auto Hold] furosemide  40 mg Intravenous BID     [Auto Hold] insulin aspart  10 Units Subcutaneous TID w/meals     [Auto Hold] insulin aspart  1-7 Units Subcutaneous TID AC     [Auto Hold] insulin aspart  1-5 Units Subcutaneous At Bedtime     [Auto Hold] insulin glargine  16 Units Subcutaneous At Bedtime     [Auto Hold] melatonin tablet 5 mg  5 mg Oral At Bedtime     [Auto Hold] metoprolol succinate  100 mg Oral Daily     [Auto Hold] miconazole   Topical TID     [Auto Hold] pantoprazole  40 mg Oral BID AC     [Auto Hold] piperacillin-tazobactam  3.375 g Intravenous Q6H     [Auto Hold] polyethylene glycol  34 g Oral Once     [Auto Hold] sennosides  2 tablet Oral BID     [Auto Hold] sodium chloride (PF)  3 mL Intracatheter Q8H       Data     Recent Labs  Lab 10/11/18  0821 10/08/18  0812 10/07/18  1325  10/06/18  1012   WBC  --  4.5 6.4  --  5.8   HGB 8.4* 9.5* 10.2*  --  9.7*   MCV   --  89 90  --  88   * 133* 154  --  132*   INR  --  1.20*  --   --   --    NA  --  136 135  --  136   POTASSIUM 4.1 3.5 3.7  < > 3.3*   CHLORIDE  --  103 100  --  103   CO2  --  26 27  --  25   BUN  --  19 20  --  19   CR  --  1.21 1.35*  --  1.20   ANIONGAP  --  7 8  --  8   JUSTINO  --  8.7 8.8  --  8.2*   GLC  --  171* 232*  --  170*   < > = values in this interval not displayed.    Imaging:   No results found for this or any previous visit (from the past 24 hour(s)).

## 2018-10-11 NOTE — ANESTHESIA POSTPROCEDURE EVALUATION
Patient: Tad Manning    Procedure(s):  CLOSURE RIGHT BELOW KNEE AMPUTATION - Wound Class: III-Contaminated    Diagnosis:DIABETIC RIGHT FOOT WITH OSTEOMYLETIS  Diagnosis Additional Information: No value filed.    Anesthesia Type:  General, LMA    Note:  Anesthesia Post Evaluation    Patient location during evaluation: PACU  Patient participation: Able to fully participate in evaluation  Level of consciousness: awake and alert  Pain management: adequate  Airway patency: patent  Cardiovascular status: acceptable and hemodynamically stable  Respiratory status: acceptable and unassisted  Hydration status: acceptable  PONV: none             Last vitals:  Vitals:    10/11/18 1450 10/11/18 1500 10/11/18 1506   BP: 143/74 132/66 132/66   Pulse:      Resp: 9 12 10   Temp:      SpO2: 100% 100% 100%         Electronically Signed By: Adarsh Barrientos DO  October 11, 2018  3:31 PM

## 2018-10-12 ENCOUNTER — APPOINTMENT (OUTPATIENT)
Dept: PHYSICAL THERAPY | Facility: CLINIC | Age: 78
DRG: 854 | End: 2018-10-12
Attending: SURGERY
Payer: MEDICARE

## 2018-10-12 PROBLEM — E87.1 HYPONATREMIA: Status: RESOLVED | Noted: 2018-10-01 | Resolved: 2018-10-12

## 2018-10-12 LAB
ANION GAP SERPL CALCULATED.3IONS-SCNC: 6 MMOL/L (ref 3–14)
BLD PROD TYP BPU: NORMAL
BLD UNIT ID BPU: 0
BLOOD PRODUCT CODE: NORMAL
BPU ID: NORMAL
BUN SERPL-MCNC: 19 MG/DL (ref 7–30)
CALCIUM SERPL-MCNC: 8.4 MG/DL (ref 8.5–10.1)
CHLORIDE SERPL-SCNC: 103 MMOL/L (ref 94–109)
CO2 SERPL-SCNC: 28 MMOL/L (ref 20–32)
CREAT SERPL-MCNC: 1.17 MG/DL (ref 0.66–1.25)
GFR SERPL CREATININE-BSD FRML MDRD: 61 ML/MIN/1.7M2
GLUCOSE BLDC GLUCOMTR-MCNC: 119 MG/DL (ref 70–99)
GLUCOSE BLDC GLUCOMTR-MCNC: 134 MG/DL (ref 70–99)
GLUCOSE BLDC GLUCOMTR-MCNC: 140 MG/DL (ref 70–99)
GLUCOSE BLDC GLUCOMTR-MCNC: 160 MG/DL (ref 70–99)
GLUCOSE BLDC GLUCOMTR-MCNC: 197 MG/DL (ref 70–99)
GLUCOSE BLDC GLUCOMTR-MCNC: 233 MG/DL (ref 70–99)
GLUCOSE BLDC GLUCOMTR-MCNC: 85 MG/DL (ref 70–99)
GLUCOSE SERPL-MCNC: 112 MG/DL (ref 70–99)
HGB BLD-MCNC: 7.6 G/DL (ref 13.3–17.7)
POTASSIUM SERPL-SCNC: 4.1 MMOL/L (ref 3.4–5.3)
SODIUM SERPL-SCNC: 137 MMOL/L (ref 133–144)
TRANSFUSION STATUS PATIENT QL: NORMAL
TRANSFUSION STATUS PATIENT QL: NORMAL

## 2018-10-12 PROCEDURE — 25000131 ZZH RX MED GY IP 250 OP 636 PS 637: Mod: GY | Performed by: INTERNAL MEDICINE

## 2018-10-12 PROCEDURE — A9270 NON-COVERED ITEM OR SERVICE: HCPCS | Mod: GY | Performed by: INTERNAL MEDICINE

## 2018-10-12 PROCEDURE — 25000132 ZZH RX MED GY IP 250 OP 250 PS 637: Mod: GY | Performed by: SURGERY

## 2018-10-12 PROCEDURE — 97530 THERAPEUTIC ACTIVITIES: CPT | Mod: GP

## 2018-10-12 PROCEDURE — 00000146 ZZHCL STATISTIC GLUCOSE BY METER IP

## 2018-10-12 PROCEDURE — P9016 RBC LEUKOCYTES REDUCED: HCPCS | Performed by: STUDENT IN AN ORGANIZED HEALTH CARE EDUCATION/TRAINING PROGRAM

## 2018-10-12 PROCEDURE — 25000132 ZZH RX MED GY IP 250 OP 250 PS 637: Mod: GY | Performed by: INTERNAL MEDICINE

## 2018-10-12 PROCEDURE — 85018 HEMOGLOBIN: CPT | Performed by: SURGERY

## 2018-10-12 PROCEDURE — 97161 PT EVAL LOW COMPLEX 20 MIN: CPT | Mod: GP

## 2018-10-12 PROCEDURE — 25000128 H RX IP 250 OP 636: Performed by: SURGERY

## 2018-10-12 PROCEDURE — 99232 SBSQ HOSP IP/OBS MODERATE 35: CPT | Performed by: INTERNAL MEDICINE

## 2018-10-12 PROCEDURE — 25000128 H RX IP 250 OP 636: Performed by: INTERNAL MEDICINE

## 2018-10-12 PROCEDURE — A9270 NON-COVERED ITEM OR SERVICE: HCPCS | Mod: GY | Performed by: SURGERY

## 2018-10-12 PROCEDURE — 12000007 ZZH R&B INTERMEDIATE

## 2018-10-12 PROCEDURE — 36415 COLL VENOUS BLD VENIPUNCTURE: CPT | Performed by: SURGERY

## 2018-10-12 PROCEDURE — 80048 BASIC METABOLIC PNL TOTAL CA: CPT | Performed by: SURGERY

## 2018-10-12 PROCEDURE — 97110 THERAPEUTIC EXERCISES: CPT | Mod: GP

## 2018-10-12 PROCEDURE — 40000193 ZZH STATISTIC PT WARD VISIT

## 2018-10-12 RX ORDER — POLYETHYLENE GLYCOL 3350 17 G/17G
34 POWDER, FOR SOLUTION ORAL ONCE
Status: COMPLETED | OUTPATIENT
Start: 2018-10-12 | End: 2018-10-12

## 2018-10-12 RX ORDER — POLYETHYLENE GLYCOL 3350 17 G/17G
17 POWDER, FOR SOLUTION ORAL 2 TIMES DAILY
Status: DISCONTINUED | OUTPATIENT
Start: 2018-10-12 | End: 2018-10-16 | Stop reason: HOSPADM

## 2018-10-12 RX ADMIN — PANTOPRAZOLE SODIUM 40 MG: 40 TABLET, DELAYED RELEASE ORAL at 06:22

## 2018-10-12 RX ADMIN — CLOPIDOGREL 75 MG: 75 TABLET, FILM COATED ORAL at 08:45

## 2018-10-12 RX ADMIN — METOPROLOL SUCCINATE 100 MG: 100 TABLET, EXTENDED RELEASE ORAL at 21:24

## 2018-10-12 RX ADMIN — POLYETHYLENE GLYCOL 3350 17 G: 17 POWDER, FOR SOLUTION ORAL at 21:25

## 2018-10-12 RX ADMIN — PIPERACILLIN SODIUM,TAZOBACTAM SODIUM 3.38 G: 3; .375 INJECTION, POWDER, FOR SOLUTION INTRAVENOUS at 08:53

## 2018-10-12 RX ADMIN — PANTOPRAZOLE SODIUM 40 MG: 40 TABLET, DELAYED RELEASE ORAL at 16:51

## 2018-10-12 RX ADMIN — Medication: at 21:30

## 2018-10-12 RX ADMIN — MICONAZOLE NITRATE: 2 POWDER TOPICAL at 21:26

## 2018-10-12 RX ADMIN — SODIUM CHLORIDE: 9 INJECTION, SOLUTION INTRAVENOUS at 03:02

## 2018-10-12 RX ADMIN — SENNOSIDES 2 TABLET: 8.6 TABLET, FILM COATED ORAL at 21:24

## 2018-10-12 RX ADMIN — SENNOSIDES 2 TABLET: 8.6 TABLET, FILM COATED ORAL at 08:46

## 2018-10-12 RX ADMIN — PIPERACILLIN SODIUM,TAZOBACTAM SODIUM 3.38 G: 3; .375 INJECTION, POWDER, FOR SOLUTION INTRAVENOUS at 14:14

## 2018-10-12 RX ADMIN — FERROUS SULFATE TAB 325 MG (65 MG ELEMENTAL FE) 325 MG: 325 (65 FE) TAB at 08:45

## 2018-10-12 RX ADMIN — POLYETHYLENE GLYCOL 3350 34 G: 17 POWDER, FOR SOLUTION ORAL at 11:19

## 2018-10-12 RX ADMIN — INSULIN GLARGINE 16 UNITS: 100 INJECTION, SOLUTION SUBCUTANEOUS at 21:25

## 2018-10-12 RX ADMIN — IBUPROFEN 600 MG: 600 TABLET, FILM COATED ORAL at 02:20

## 2018-10-12 RX ADMIN — MICONAZOLE NITRATE: 2 POWDER TOPICAL at 09:26

## 2018-10-12 RX ADMIN — MICONAZOLE NITRATE: 2 POWDER TOPICAL at 16:47

## 2018-10-12 RX ADMIN — AMOXICILLIN AND CLAVULANATE POTASSIUM 1 TABLET: 875; 125 TABLET, FILM COATED ORAL at 19:25

## 2018-10-12 RX ADMIN — MELATONIN 5 MG TABLET 5 MG: at 21:24

## 2018-10-12 RX ADMIN — FUROSEMIDE 40 MG: 40 TABLET ORAL at 08:46

## 2018-10-12 RX ADMIN — VITAMIN D, TAB 1000IU (100/BT) 1000 UNITS: 25 TAB at 08:46

## 2018-10-12 RX ADMIN — ATORVASTATIN CALCIUM 20 MG: 20 TABLET, FILM COATED ORAL at 08:45

## 2018-10-12 RX ADMIN — Medication: at 02:57

## 2018-10-12 RX ADMIN — CITALOPRAM HYDROBROMIDE 10 MG: 10 TABLET ORAL at 08:46

## 2018-10-12 RX ADMIN — PIPERACILLIN SODIUM,TAZOBACTAM SODIUM 3.38 G: 3; .375 INJECTION, POWDER, FOR SOLUTION INTRAVENOUS at 03:10

## 2018-10-12 ASSESSMENT — ACTIVITIES OF DAILY LIVING (ADL)
ADLS_ACUITY_SCORE: 12
ADLS_ACUITY_SCORE: 15
ADLS_ACUITY_SCORE: 12
ADLS_ACUITY_SCORE: 15
ADLS_ACUITY_SCORE: 12
ADLS_ACUITY_SCORE: 12

## 2018-10-12 NOTE — PROGRESS NOTES
[facility]    Hospitalist Progress Note    Assessment & Plan   Tad Manning is a 77 year old male with DM type 2 and peripheral neuropathy, who was admitted on 9/30/2018 with osteomyelitis right foot:    Impression:   Principal Problem:    Osteomyelitis Right Foot -- S/P BKA 10/11/18  Active Problems:    DM type 2 w neuropathy -- Hgb A1C 9.0 on 9/22/18    HTN (hypertension)    CAD -- S/P CABG    Osteomyelitis left 2nd Toe s/p amputaion 1/10/14    CRF (chronic renal failure), stage 3 (moderate) (H)    Chronic systolic heart failure (H)    Atrial fibrillation -- on Warfarin       Plan: Will stop Zosyn and start Augmentin 875 mg bid for 3 days (osteomyelitis surgically removed).      DVT Prophylaxis: Pneumatic Compression Devices  Code Status: Full Code    Disposition: Discharge to TCU approx Monday (per Surgery).     Mikael Thomas MD  Pager [phone]  Cell Phone [phone]  Text Page (7am to 6pm)    Interval History   Right BKA planned today, no BM yet.       Physical Exam   Temp: 97.7  F (36.5  C) Temp src: Oral BP: 116/58 Pulse: 59 Heart Rate: 58 Resp: 16 SpO2: 99 % O2 Device: Nasal cannula Oxygen Delivery: 2 LPM  Vitals:    10/10/18 0244 10/11/18 0548 10/12/18 0635   Weight: 82.1 kg (181 lb) 81.6 kg (179 lb 14.3 oz) 79.1 kg (174 lb 6.1 oz)     Vital Signs with Ranges  Temp:  [97.6  F (36.4  C)-99.5  F (37.5  C)] 97.7  F (36.5  C)  Pulse:  [59-81] 59  Heart Rate:  [58-98] 58  Resp:  [9-20] 16  BP: (110-161)/(50-84) 116/58  SpO2:  [96 %-100 %] 99 %  I/O last 3 completed shifts:  In: 2648 [P.O.:570; I.V.:1528]  Out: 1285 [Urine:1070; Drains:15; Blood:200]    # Pain Assessment:  Current Pain Score 10/12/2018   Patient currently in pain? -   Pain score (0-10) 4   Pain location -   Pain descriptors -   Tad s pain level was assessed and he currently denies pain.        Constitutional: Awake, alert, cooperative, no apparent distress  Respiratory: Clear to auscultation  bilaterally, no crackles or wheezing  Cardiovascular: Regular rate and rhythm, normal S1 and S2, and no murmur noted  GI: Normal bowel sounds, soft, non-distended, non-tender  Extrem: No calf tenderness, no ankle edema, right foot amputation with stump covered with clear dressing.   Neuro: Ox3, no focal motor deficits, feet numb to level of just above ankle on left.     Medications     HYDROmorphone       sodium chloride 10 mL/hr at 10/12/18 0627       atorvastatin  20 mg Oral Daily     cholecalciferol  1,000 Units Oral Daily     citalopram (celeXA) tablet 10 mg  10 mg Oral Daily     clopidogrel  75 mg Oral Daily     ferrous sulfate  325 mg Oral Daily     furosemide  40 mg Oral Daily     insulin aspart  10 Units Subcutaneous TID w/meals     insulin aspart  1-7 Units Subcutaneous TID AC     insulin aspart  1-5 Units Subcutaneous At Bedtime     insulin glargine  16 Units Subcutaneous At Bedtime     melatonin tablet 5 mg  5 mg Oral At Bedtime     metoprolol succinate  100 mg Oral Daily     miconazole   Topical TID     pantoprazole  40 mg Oral BID AC     piperacillin-tazobactam  3.375 g Intravenous Q6H     polyethylene glycol  34 g Oral Once     sennosides  2 tablet Oral BID     sodium chloride (PF)  3 mL Intracatheter Q8H       Data     Recent Labs  Lab 10/11/18  1657 10/11/18  0821 10/08/18  0812 10/07/18  1325  10/06/18  1012   WBC 9.1  --  4.5 6.4  --  5.8   HGB 7.8* 8.4* 9.5* 10.2*  --  9.7*   MCV 90  --  89 90  --  88    137* 133* 154  --  132*   INR  --   --  1.20*  --   --   --    NA  --   --  136 135  --  136   POTASSIUM  --  4.1 3.5 3.7  < > 3.3*   CHLORIDE  --   --  103 100  --  103   CO2  --   --  26 27  --  25   BUN  --   --  19 20  --  19   CR  --   --  1.21 1.35*  --  1.20   ANIONGAP  --   --  7 8  --  8   JUSTINO  --   --  8.7 8.8  --  8.2*   GLC  --   --  171* 232*  --  170*   < > = values in this interval not displayed.    Imaging:   No results found for this or any previous visit (from the past 24  hour(s)).

## 2018-10-12 NOTE — CONSULTS
Care Transition Initial Assessment - RN        Met with: Patient--phone call out to sister in [place] fuller per patient request--Joyce called back and had questions about placement post discharge, explained patient had a prolonged stay at [facility] about 1 1/2 yrs ago.  Joyce has concerns about accessibility in his apartment. I called the [facility] liaison to call and reach out to Joyce today via phone to answer preliminary questions and liaison anticipates meeting with patient on Monday.   DATA   Principal Problem:    Osteomyelitis Right Foot -- S/P BKA 10/11/18  Active Problems:    DM type 2 w neuropathy -- Hgb A1C 9.0 on 9/22/18    HTN (hypertension)    CAD -- S/P CABG    Osteomyelitis left 2nd Toe s/p amputaion 1/10/14    CRF (chronic renal failure), stage 3 (moderate) (H)    Chronic systolic heart failure (H)    Atrial fibrillation -- on Warfarin       Cognitive Status: awake, alert and oriented.        Contact information and PCP information verified: Yes  Lives With: significant other                    Insurance concerns: No Insurance issues identified  ASSESSMENT  Patient currently receives the following services:  Was open to [facility] services prior to admission for therapy and RN         Identified issues/concerns regarding health management: anticipates adjustment needed with new R BKA  Patient lives in a 3rd floor apartment with elevator access, he was using [facility] services. He anticipates returning home once rehab complete but anticipates needing transportation help as he is anticipating not being able to drive.   PLAN  Financial costs for the patient include per insurance .  Patient given options and choices for discharge yes, we discussed both acute rehab and TCU I provided him with acute rehab brochure and a listing of TCU's.  Patient/family is agreeable to the plan?  Undetermined yet, awaiting rehab recommendations  Patient anticipates discharging to some type of rehab .        Patient anticipates needs for  home equipment: Does not know  Plan/Disposition: [facility] vs TCU pending therapy evaluations and patient choice  Appointments:     Care  (CTS) will continue to follow as needed.

## 2018-10-12 NOTE — PLAN OF CARE
Problem: Amputation Lower Extremity (Transfemoral) (IRF) (Adult)  Goal: Promote Optimal Level of Function  Outcome: Improving  Pt. Is A&O x4, VSS, on O2 @ 2 LPM via NC. PCA for pain control, pain is better controlled this shift. Hgb drawn this AM= 7.6, Provider updated via text, provider wants Hgb drawn tomorrow AM- does not need immediate intervention at this time. RLE has immobilizer in place, pt. Able to CARABALLO, MICHELLE drain on RLE. Pt. Able to void spontaneously w/o difficulty, advanced to mod carb diet, tolerates well. Pt. Is repositioned q2h, BS + hypoactive, LS clear bilaterally.

## 2018-10-12 NOTE — PROGRESS NOTES
Spoke with Dr. Vick on floor regarding pt. receiving one unit of blood overnight w/o a Hgb re-check in the AM. Verbal order for HGB to be rechecked this AM.

## 2018-10-12 NOTE — OP NOTE
Procedure Date: 10/11/2018      PREOPERATIVE DIAGNOSIS:  Status post open right lower leg amputation secondary to diabetic right foot with osteomyelitis.      POSTOPERATIVE DIAGNOSIS:  Status post open right lower leg amputation secondary to diabetic right foot with osteomyelitis.      PROCEDURE:  Formalization (closure) of right below-knee amputation.      SURGEON:  Amador Vick MD      :  Sam Silva MD (Vascular fellow).       ANESTHESIA:  LMA general anesthesia.      ESTIMATED BLOOD LOSS:  200 mL.      OPERATIVE INDICATIONS:  This patient is an unfortunate 77-year-old diabetic gentleman with a significant cardiovascular history.  He presented this admission with a non-salvageable right foot which resulted in an open distal right lower extremity amputation a few days ago.  He has been maintained with a wound VAC and appropriate antibiotic coverage.  Plan for today will be to close this right below-knee amputation.      OPERATIVE FINDINGS:  We transected the tibia 12 cm below the tibial tuberosity.  There was excellent blood supply and all muscle was clearly viable.  The anterior tibial and posterior tibial arteries had minimal flow, but there was pulsatile flow out the transected peroneal artery.  We had a nice tension-free soft tissue coverage over the transected bones and a tension-free closure of the fascia and skin.      DESCRIPTION OF TECHNIQUE:  After informed consent was obtained, the patient was brought to the operating room and placed on the table in a supine position, after which LMA general anesthesia was achieved without incident.  A Dugan catheter was placed.  His right lower extremity was prepped and draped in the usual sterile fashion including placement of a sterile glove over the open end of the distal right lower extremity.  This glove was sewn in place with a few 2-0 silk sutures.  An anterior incision was made roughly 14 cm below the tibial tuberosity.  We developed the  standard posterior flap.  Dissection proceeded through the anterior compartment using electrocautery to isolate the anterior tibial neurovascular bundle.  Those structures were individually ligated between silk ties.  I entered the deep and superficial compartment, taking down soleus muscle and the posterior tibial neurovascular bundle was identified.  Those structures were individually ligated between silk ties.  Periosteum for the tibia and fibula was cleared off circumferentially for 2 cm proximal to our skin incision.  We utilized the oscillating saw to transect the tibia.  We utilized a bone aster to divide the fibula 1 cm proximal to our tibial transection.  The tibia was also beveled anteriorly at about 30 degrees.  We used an amputation knife to divide the muscle of the posterior flap, beveling it distally.  The specimen was removed from the field.  Pulsatile flow was noted in the transected peroneal artery and the structures of the peroneal neurovascular bundle were ligated with 2-0 silk stick ties.  Additional time was spent obtaining satisfactory hemostasis for the posterior flap.  We utilized interrupted 2-0 silk sutures to ligate any side branches or bleeding vessels.  Ultimately, we obtained excellent hemostasis.  The posterior flap and the stump was irrigated with 2 liters of warm saline.  He did have some mild oozing from the transected bone and from the muscle itself as he has been maintained on Plavix uninterrupted due to recent placement of drug-eluting coronary stents.  We chose to place a drain.  A 15 round Bradley-Putnam drain was brought in through a lateral stab incision and placed in the depths of the wound.  Fascia was closed using interrupted 0 Vicryl figure-of-eight sutures.  Subcutaneous tissue was closed with interrupted 3-0 Vicryl subdermal sutures.  Skin was atraumatically closed with interrupted 3-0 nylon vertical mattress sutures.  We had an excellent tension-free closure and I was  very satisfied with its quality.  The stump was washed and dried.  The wound was dressed with a strip of Xeroform gauze over the suture line followed by fluff gauze, ABD pads, Kerlix and an Ace wrap.  The below-knee amputation stump was then placed in a Rooke stump protector.  Final sponge and needle count were reported as correct.  The patient tolerated the procedure without incident.  He was returned extubated and hemodynamically stable to the recovery room.         SHIRLEY GONZALEZ MD             D: 10/12/2018   T: 10/12/2018   MT: MEGAN      Name:     PETER BUTLER   MRN:      [id number]        Account:        [id number]   :      1940           Procedure Date: 10/11/2018      Document: [id number]

## 2018-10-12 NOTE — PLAN OF CARE
Problem: Patient Care Overview  Goal: Plan of Care/Patient Progress Review  Outcome: No Change  Pt A&O x4. VSS on 2L O2. LS diminished. BS hypoactive. 1 unit of blood transfused, no reaction. PCA started. Pain controlled. Reposition q 2 hours. RLE has special orthotic immobilizer in place, no signs of drainage. Drsg in place. MICHELLE x1 on right side, put out 5mL. Dugan removed, DTV. Tolerating clear liquid diet. Slept between cares.

## 2018-10-12 NOTE — PROGRESS NOTES
[facility]    Vascular Surgery Progress Note    Assessment & Plan   Procedure(s):  AMPUTATE REVISION STUMP LOWER EXTREMITY   -1 Day Post-Op   76 y/o male s/p RLE BKA revision and closure for nonhealing ulcer POD 1  --Continue with Rooke boot. NWB to RLE   --Plan for dressing change in 3-5 days  --Continue with pain control     Principal Problem:    Osteomyelitis Right Foot -- S/P foot amp 10/8/18  Active Problems:    DM type 2 w neuropathy -- Hgb A1C 9.0 on 9/22/18    HTN (hypertension)    CAD -- S/P CABG    Osteomyelitis left 2nd Toe s/p amputaion 1/10/14    CRF (chronic renal failure), stage 3 (moderate) (H)    Chronic systolic heart failure (H)    Atrial fibrillation -- on Warfarin    Hyponatremia      Min Lela Silva MD    Interval History   No acute events overnight. Patient received one unit PRBC. Pain is controlled with PCA    Physical Exam   Temp: 97.7  F (36.5  C) Temp src: Oral BP: 116/58 Pulse: 59 Heart Rate: 58 Resp: 16 SpO2: 99 % O2 Device: Nasal cannula Oxygen Delivery: 2 LPM  Vitals:    10/10/18 0244 10/11/18 0548 10/12/18 0635   Weight: 82.1 kg (181 lb) 81.6 kg (179 lb 14.3 oz) 79.1 kg (174 lb 6.1 oz)     Vital Signs with Ranges  Temp:  [97.6  F (36.4  C)-99.5  F (37.5  C)] 97.7  F (36.5  C)  Pulse:  [59-81] 59  Heart Rate:  [58-98] 58  Resp:  [9-20] 16  BP: (110-161)/(50-84) 116/58  SpO2:  [96 %-100 %] 99 %  I/O last 3 completed shifts:  In: 2648 [P.O.:570; I.V.:1528]  Out: 1285 [Urine:1070; Drains:15; Blood:200]    PE:   NAD, awake and alert  Chest: S1 S2 present, RRR, no wheezing   Abd: soft, NT, ND, no rebound or guarding  Ex: RLE BKA stump wrapped. Dressing c/d/i.     Medications     HYDROmorphone       sodium chloride 10 mL/hr at 10/12/18 0627        atorvastatin  20 mg Oral Daily     cholecalciferol  1,000 Units Oral Daily     citalopram (celeXA) tablet 10 mg  10 mg Oral Daily     clopidogrel  75 mg Oral Daily     ferrous sulfate  325 mg Oral Daily     furosemide  40 mg Oral Daily      insulin aspart  10 Units Subcutaneous TID w/meals     insulin aspart  1-7 Units Subcutaneous TID AC     insulin aspart  1-5 Units Subcutaneous At Bedtime     insulin glargine  16 Units Subcutaneous At Bedtime     melatonin tablet 5 mg  5 mg Oral At Bedtime     metoprolol succinate  100 mg Oral Daily     miconazole   Topical TID     pantoprazole  40 mg Oral BID AC     piperacillin-tazobactam  3.375 g Intravenous Q6H     polyethylene glycol  34 g Oral Once     sennosides  2 tablet Oral BID     sodium chloride (PF)  3 mL Intracatheter Q8H       Data   CBC RESULTS:   Recent Labs   Lab Test  10/11/18   1657   WBC  9.1   RBC  2.62*   HGB  7.8*   HCT  23.5*   MCV  90   MCH  29.8   MCHC  33.2   RDW  16.1*   PLT  152

## 2018-10-12 NOTE — PROVIDER NOTIFICATION
MD Notification    Notified Person: MD    Notified Person Name: On call Vascular surgery- Dr. Sethi    Notification Date/Time: 10/11/18 1910    Notification Interaction:spoke with Dr. Sethi    Purpose of Notification: Patient complaining of pain 10/10 with no relief from ordered pain medicine    Orders Received: One time dose of PO 10 mg oxycodone    Comments: Encouraged to use PRN tylenol and PRN dilaudid

## 2018-10-12 NOTE — PROGRESS NOTES
MD Notification    Notified Person: MD    Notified Person Name: Enoch     Notification Date/Time: 10/12/18 @ 1238    Notification Interaction: text page     Purpose of Notification: Hgb re-check 7.6    Orders Received: Recheck Hgb in morning. No new orders for blood transfusion at this time.    Comments:

## 2018-10-12 NOTE — PLAN OF CARE
Problem: Patient Care Overview  Goal: Plan of Care/Patient Progress Review  Discharge Planner PT   Patient plan for discharge: ARU  Current status: PT order received. Chart reviewed. PT evaluation and treatment initiated. 78y/o M admitted with Diabetic right foot with osteomyelitis. S/p Open distal right lower leg amputation with placement of wound VAC on 10/8/18 and now  s/p Right lower extremity below knee amputation revision and closure on 10/11 by Dr. Silva.  Orders for NWBing right LE. Pt had a rook rigid protector fit by orthotics that was on during session.  PLOF: pt mod IND with his 4WW, reports about 1 month ago was able to walk up to 1 mile.  Pt reports has been using 4WW since discharge from ARU a few years ago following a back surgery. History of recent falls. Pt lives in an apt with elevator access and has a roommate Patrice who works during the day.  Instructed with right LE ex with good tolerance. Pain level during session was 4/10 without use of PCA. Pt requires mod A for supine to sit and bed mobility, max A x 2 for sit to stand with FWW with NWBing right LE, however pt unable to fully extend his right knee in standing and come to an erect position. Pt required max A x 2 squat pivot transfer to WC with elevating leg rests.   Barriers to return to prior living situation: level of assist  Recommendations for discharge: ARU, anticipate pt may need a WC at discharge for primary mobility at this time.   Rationale for recommendations: Pt will continue to benefit from skilled PT to maximize his level of mobility to decrease his level of burden of care.        Entered by: Kaylie Terry 10/12/2018 4:21 PM            Yes

## 2018-10-12 NOTE — PLAN OF CARE
Problem: Patient Care Overview  Goal: Plan of Care/Patient Progress Review  Outcome: No Change  The patient is Ax4, he returned from right BKA closure surgery at 1600.  He c/o pain 9/10, given 0.4 IV dilaudid with little relief, given PO oxycodone 10 mg after patient able to tolerate popsicle and water.  Minimal relief, paged on call surgery, received order for extra dose of 10 mg PO oxycodone, gave that with 650 mg of tylenol and the patient reported constant sharp pain 8/10.  Repositioned every 2 hours. LE's elevated.  Right has special orthotic immobilizer in place, opened and assessed, no signs of drainage.  MICHELLE with minimal drainage.  Left knee wound covered with mepilex, has soreness in that knee.  Coccyx covered with mepilex, C/D/I.  VSS on 2 L O2. Lungs clear.  Dugan patent, passed small amount of flatus, BS+ are returning.  Hgb 7.8.  Glucose 233, and 197 at bedtime.

## 2018-10-12 NOTE — PROGRESS NOTES
10/12/18 1330   Quick Adds   Type of Visit Initial PT Evaluation   Living Environment   Lives With (lives with roommate Patrice )   Living Arrangements apartment   Home Accessibility no concerns  (elevator access)   Transportation Available car;family or friend will provide  (pt has not driven since 2015)   Living Environment Comment was able to walk up to 1 mile with 4WW    Self-Care   Dominant Hand right   Usual Activity Tolerance good   Current Activity Tolerance moderate   Equipment Currently Used at Home walker, standard   Activity/Exercise/Self-Care Comment has transport WC, however his brother is using    Functional Level Prior   Ambulation 1-->assistive equipment   Transferring 1-->assistive equipment   Toileting 1-->assistive equipment   Bathing 1-->assistive equipment  (shower chair)   Fall history within last six months yes   Number of times patient has fallen within last six months 4   Which of the above functional risks had a recent onset or change? ambulation;transferring   Prior Functional Level Comment reports lives with roommate who works during the day, [facility], was amb with 4WW since d/c from ARU few years ago   General Information   Onset of Illness/Injury or Date of Surgery - Date 09/30/18   Referring Physician Sam Silva MD   Patient/Family Goals Statement to go to ARU   Pertinent History of Current Problem (include personal factors and/or comorbidities that impact the POC) 78y/o M admitted with Diabetic right foot with osteomyelitis. S/p Open distal right lower leg amputation with placement of wound VAC on 10/8/18 and now  s/p Right lower extremity below knee amputation revision and closure on 10/11 by Dr. Silva.  Orders for NWBing right LE. Pt had a rook rigid protector fit by orthotics that was on during session. history of back fusion   Precautions/Limitations fall precautions   Weight-Bearing Status - LLE full weight-bearing   Weight-Bearing Status - RLE nonweight-bearing    General Observations pt resting in bed, NAD, roommate Patrice present at bedside, PCA, Rook Rigid Protector over right stump   Cognitive Status Examination   Orientation person;place  (reports Sept)   Level of Consciousness alert   Follows Commands and Answers Questions 100% of the time   Pain Assessment   Patient Currently in Pain Yes, see Vital Sign flowsheet  (4/10 stump, reports pain in left knee from fall)   Range of Motion (ROM)   ROM Comment WNL bilateral UE, left LE WNL (stiffness and pain in left knee with flex/ext), right LE hip WNL   Strength   Strength Comments 5/5 bilateral UE, however demos functional weakness with transfers and seated scooting, right hip flex: 3-/5, left hip flex 3/5, knee ext 4/5, left knee flex: 4/5, L ankle DF/PF:5/5, weakness in left knee with standing (unable to fully extend left knee)   Bed Mobility   Bed Mobility Comments mod A with supine to sit with cues and railing    Transfer Skills   Transfer Comments sit to stand max A x 2 with FWW, NWBing right LE, unable to resume full knee ext in standing or trunk extension   Gait   Gait Comments Unable   Balance   Balance Comments SBA with static sitting, poor standing balance with NWBing on right LE, max A x 2   Sensory Examination   Sensory Perception Comments impaired senstation in left lower leg and foot to light touch and deep pressure, reports numbness/tingling in hands due to neuropathy   General Therapy Interventions   Planned Therapy Interventions balance training;bed mobility training;strengthening;transfer training;wheelchair management/propulsion training;home program guidelines;progressive activity/exercise   Clinical Impression   Criteria for Skilled Therapeutic Intervention yes, treatment indicated   PT Diagnosis impaired gait   Influenced by the following impairments impaired balance, pain, impaired sensation, NWBing status    Functional limitations due to impairments impaired transfers, impaired gait   Clinical  "Presentation Stable/Uncomplicated   Clinical Presentation Rationale based on clinicial presentation   Clinical Decision Making (Complexity) Low complexity   Therapy Frequency` daily   Predicted Duration of Therapy Intervention (days/wks) 1 week   Anticipated Equipment Needs at Discharge (TBD-defer recommendations to ARU)   Anticipated Discharge Disposition Acute Rehabilitation Facility   Risk & Benefits of therapy have been explained Yes   Patient, Family & other staff in agreement with plan of care Yes   [facility] TM \"6 Clicks\"   2016, Trustees of [facility], under license to [employer].  All rights reserved.   6 Clicks Short Forms Basic Mobility Inpatient Short Form   [facility] AM-PAC  \"6 Clicks\" V.2 Basic Mobility Inpatient Short Form   1. Turning from your back to your side while in a flat bed without using bedrails? 2 - A Lot   2. Moving from lying on your back to sitting on the side of a flat bed without using bedrails? 2 - A Lot   3. Moving to and from a bed to a chair (including a wheelchair)? 1 - Total   4. Standing up from a chair using your arms (e.g., wheelchair, or bedside chair)? 1 - Total   5. To walk in hospital room? 1 - Total   6. Climbing 3-5 steps with a railing? 1 - Total   Basic Mobility Raw Score (Score out of 24.Lower scores equate to lower levels of function) 8   Total Evaluation Time   Total Evaluation Time (Minutes) 13     "

## 2018-10-12 NOTE — PROGRESS NOTES
"BRIEF NUTRITION REASSESSMENT      CURRENT DIET AND INTAKE:  Diet:  Mod carb    Pt reports good appetite. Receives Boost Glucose Control, \"when I remember to order it.\"           ANTHROPOMETRICS:  Vitals:    09/30/18 2140 10/05/18 0607 10/06/18 0514 10/07/18 0310   Weight: 80.5 kg (177 lb 8 oz) 84.4 kg (186 lb 1.6 oz) 80.2 kg (176 lb 12.8 oz) 79.4 kg (175 lb 0.7 oz)    10/09/18 0600 10/10/18 0244 10/11/18 0548 10/12/18 0635   Weight: 82.6 kg (182 lb 1.6 oz) 82.1 kg (181 lb) 81.6 kg (179 lb 14.3 oz) 79.1 kg (174 lb 6.1 oz)       LABS:  Labs noted      NUTRITION INTERVENTION:  Nutrition Diagnosis:  No nutrition diagnosis at this time.      FOLLOW UP/MONITORING:   Will re-evaluate in 7 - 10 days, or sooner, if re-consulted.      Raissa Fuentes RD  Pager [phone] (M-F)            [phone] (W/E & Hol)          "

## 2018-10-12 NOTE — PLAN OF CARE
Problem: Patient Care Overview  Goal: Plan of Care/Patient Progress Review  Outcome: No Change  Pt A&O x4. VSS on 2L O2. LS diminished. BS hypoactive. 1 unit of blood transfused, no reaction. PCA started. Pain controlled. Reposition q 2 hours. RLE has special orthotic immobilizer in place, no signs of drainage. MICHELLE x1 on right side, put out 5mL. Dugan removed, DTV. Tolerating clear liquid diet. Slept between cares.

## 2018-10-12 NOTE — PROGRESS NOTES
POD #1 completion right BKA    Resting comfortably.  Pain better controlled now on Dilaudid PCA with supplemental ibuprofen.  In relatively good spirits.    AVSS  Right BKA stump MICHELLE drain 15 cc since surgery.  I stripped the drain.  Right BKA stump dressing dry and intact with Rooke stump protector on.    Creatinine 1.17 with GFR of 61  Transfused 1 unit last evening for postop hemoglobin of 7.8.  Repeat hemoglobin pending but no clinical signs of bleeding.    IMPRESSION:  POD #1 right BKA satisfactory postop.    PLAN:  Dugan catheter is already out.  Dilaudid PCA and ibuprofen for pain  PT/OT consult  MICHELLE drain likely out tomorrow.  Disposition to appropriate acute rehab or TCU facility in about 4 days.    Solis Vick MD

## 2018-10-13 LAB
ABO + RH BLD: NORMAL
ABO + RH BLD: NORMAL
BLD GP AB SCN SERPL QL: NORMAL
BLD PROD TYP BPU: NORMAL
BLD PROD TYP BPU: NORMAL
BLD UNIT ID BPU: 0
BLOOD BANK CMNT PATIENT-IMP: NORMAL
BLOOD PRODUCT CODE: NORMAL
BPU ID: NORMAL
GLUCOSE BLDC GLUCOMTR-MCNC: 114 MG/DL (ref 70–99)
GLUCOSE BLDC GLUCOMTR-MCNC: 148 MG/DL (ref 70–99)
GLUCOSE BLDC GLUCOMTR-MCNC: 153 MG/DL (ref 70–99)
GLUCOSE BLDC GLUCOMTR-MCNC: 167 MG/DL (ref 70–99)
HGB BLD-MCNC: 7.3 G/DL (ref 13.3–17.7)
NUM BPU REQUESTED: 2
SPECIMEN EXP DATE BLD: NORMAL
TRANSFUSION STATUS PATIENT QL: NORMAL
TRANSFUSION STATUS PATIENT QL: NORMAL

## 2018-10-13 PROCEDURE — 25000132 ZZH RX MED GY IP 250 OP 250 PS 637: Mod: GY | Performed by: SURGERY

## 2018-10-13 PROCEDURE — A9270 NON-COVERED ITEM OR SERVICE: HCPCS | Mod: GY | Performed by: INTERNAL MEDICINE

## 2018-10-13 PROCEDURE — 25000132 ZZH RX MED GY IP 250 OP 250 PS 637: Mod: GY | Performed by: INTERNAL MEDICINE

## 2018-10-13 PROCEDURE — P9016 RBC LEUKOCYTES REDUCED: HCPCS | Performed by: STUDENT IN AN ORGANIZED HEALTH CARE EDUCATION/TRAINING PROGRAM

## 2018-10-13 PROCEDURE — 25000131 ZZH RX MED GY IP 250 OP 636 PS 637: Mod: GY | Performed by: INTERNAL MEDICINE

## 2018-10-13 PROCEDURE — 36415 COLL VENOUS BLD VENIPUNCTURE: CPT | Performed by: INTERNAL MEDICINE

## 2018-10-13 PROCEDURE — 25000128 H RX IP 250 OP 636: Performed by: SURGERY

## 2018-10-13 PROCEDURE — 85018 HEMOGLOBIN: CPT | Performed by: INTERNAL MEDICINE

## 2018-10-13 PROCEDURE — 99232 SBSQ HOSP IP/OBS MODERATE 35: CPT | Performed by: INTERNAL MEDICINE

## 2018-10-13 PROCEDURE — 00000146 ZZHCL STATISTIC GLUCOSE BY METER IP

## 2018-10-13 PROCEDURE — A9270 NON-COVERED ITEM OR SERVICE: HCPCS | Mod: GY | Performed by: SURGERY

## 2018-10-13 PROCEDURE — 12000007 ZZH R&B INTERMEDIATE

## 2018-10-13 RX ADMIN — MICONAZOLE NITRATE: 2 POWDER TOPICAL at 09:21

## 2018-10-13 RX ADMIN — INSULIN ASPART 1 UNITS: 100 INJECTION, SOLUTION INTRAVENOUS; SUBCUTANEOUS at 14:44

## 2018-10-13 RX ADMIN — MICONAZOLE NITRATE: 2 POWDER TOPICAL at 22:37

## 2018-10-13 RX ADMIN — SENNOSIDES 2 TABLET: 8.6 TABLET, FILM COATED ORAL at 09:12

## 2018-10-13 RX ADMIN — AMOXICILLIN AND CLAVULANATE POTASSIUM 1 TABLET: 875; 125 TABLET, FILM COATED ORAL at 20:19

## 2018-10-13 RX ADMIN — POLYETHYLENE GLYCOL 3350 17 G: 17 POWDER, FOR SOLUTION ORAL at 20:20

## 2018-10-13 RX ADMIN — INSULIN ASPART 1 UNITS: 100 INJECTION, SOLUTION INTRAVENOUS; SUBCUTANEOUS at 09:19

## 2018-10-13 RX ADMIN — ATORVASTATIN CALCIUM 20 MG: 20 TABLET, FILM COATED ORAL at 09:12

## 2018-10-13 RX ADMIN — FUROSEMIDE 40 MG: 40 TABLET ORAL at 09:12

## 2018-10-13 RX ADMIN — SODIUM CHLORIDE: 9 INJECTION, SOLUTION INTRAVENOUS at 06:34

## 2018-10-13 RX ADMIN — MELATONIN 5 MG TABLET 5 MG: at 22:36

## 2018-10-13 RX ADMIN — SENNOSIDES 2 TABLET: 8.6 TABLET, FILM COATED ORAL at 20:19

## 2018-10-13 RX ADMIN — METOPROLOL SUCCINATE 100 MG: 100 TABLET, EXTENDED RELEASE ORAL at 20:19

## 2018-10-13 RX ADMIN — PANTOPRAZOLE SODIUM 40 MG: 40 TABLET, DELAYED RELEASE ORAL at 18:15

## 2018-10-13 RX ADMIN — POLYETHYLENE GLYCOL 3350 17 G: 17 POWDER, FOR SOLUTION ORAL at 09:27

## 2018-10-13 RX ADMIN — PANTOPRAZOLE SODIUM 40 MG: 40 TABLET, DELAYED RELEASE ORAL at 06:39

## 2018-10-13 RX ADMIN — INSULIN GLARGINE 16 UNITS: 100 INJECTION, SOLUTION SUBCUTANEOUS at 22:36

## 2018-10-13 RX ADMIN — METFORMIN HYDROCHLORIDE 500 MG: 500 TABLET ORAL at 18:15

## 2018-10-13 RX ADMIN — CITALOPRAM HYDROBROMIDE 10 MG: 10 TABLET ORAL at 09:12

## 2018-10-13 RX ADMIN — FERROUS SULFATE TAB 325 MG (65 MG ELEMENTAL FE) 325 MG: 325 (65 FE) TAB at 09:12

## 2018-10-13 RX ADMIN — AMOXICILLIN AND CLAVULANATE POTASSIUM 1 TABLET: 875; 125 TABLET, FILM COATED ORAL at 09:12

## 2018-10-13 RX ADMIN — CLOPIDOGREL 75 MG: 75 TABLET, FILM COATED ORAL at 09:12

## 2018-10-13 RX ADMIN — VITAMIN D, TAB 1000IU (100/BT) 1000 UNITS: 25 TAB at 09:13

## 2018-10-13 ASSESSMENT — ACTIVITIES OF DAILY LIVING (ADL)
ADLS_ACUITY_SCORE: 12
ADLS_ACUITY_SCORE: 17
ADLS_ACUITY_SCORE: 12
ADLS_ACUITY_SCORE: 17

## 2018-10-13 ASSESSMENT — PAIN DESCRIPTION - DESCRIPTORS: DESCRIPTORS: ACHING

## 2018-10-13 NOTE — PROGRESS NOTES
[facility]    Internal Medicine Hospitalist Progress Note  10/13/2018  I evaluated patient on the above date.    Abdiel Alejandro Jr., MD  [phone] (p)  Text Page      Assessment & Plan   Tad Manning is a 77 year old male history including DM2 with neuropathy, HTN, CAD, CHF, PAD, CKD and atrial fibrillation, who presented 9/30 with R foot wound with concerns for osteomyelitis.    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  Patient transferred from [facility] to [facility] 9/30 for further evaluation of non-healing right foot ulcer. Started on vanco and Zosyn on admit. Seen by Vascular Surgery. MRI right foot 10/10 howed plantar lateral skin ulcer at the level of the fifth metatarsal head with devitalized soft tissue thickening distal to the ulcer in the plantar fifth toe; nonspecific mild bone marrow edema in the fifth metatarsal head and proximal phalanx base may be reactive change alone but early osteomyelitis not excluded. US WALLACE 10/1 showed left within normal limits, right suggested moderate arterial insufficiency. Delay in surgery due to cardiac issues (see below). Ultimately underwent R BKA 10/11. Vanco discontinued 10/9. Zosyn changed to Augmenting 10/12.  - Post-op management per Vascular.  - Continue Augmentin (started 10/12) - plan stop after 10/14.    Acute blood loss anemia on chronic anemia.  Baseline hgb around 9-10 range. Iron studies 10/1 suggested ACD +/- iron deficiency. TSH, B12, folate on 10/12 were normal. Received prbc's 10/3, 10/4 and 10/8.   Recent Labs  Lab 10/13/18  0740 10/12/18  1150 10/11/18  1657 10/11/18  0821 10/08/18  0812 10/07/18  1325   HGB 7.3* 7.6* 7.8* 8.4* 9.5* 10.2*   - Monitor CBC.  - Consider prbc transfusion if hgb </= 7.0 or if significant bleeding with hemodynamic instability or if  symptomatic.    Ischemic cardiomyopathy with acute on chronic biventricular systolic CHF exacerbation.  Hypertension (benign essential).  [PTA BP meds/diuretics: furosemide 40 mg daily, metoprolol  mg daily.]  * S/p 3-vessel CABG in 1995. S/p PCI to mLAD 3/2018. Echo 7/2018 showed LVEF 25%.  * Seen by Cardiology this stay. Echo 10/5 showed LVEF 35-45%; RV systolic function mildly decreased. Diuresed with IV Lasix this stay.  * Wt (admit wt 177 lbs 9/30): 175 lbs 10/13 <-- 174 lbs <-- 179 lbs.  - Continue atorvastatin, clopidogrel, furosemide, metoprolol XL, PRN NTG.  - Monitor i/o's, daily wts.    DM2 with vascular complications.  [PTA: metformin 500 mg BID.]  Hgb A1C 9.0 9/22. Started on Lantus this stay.  Recent Labs   Lab Test  10/13/18   1218  10/13/18   0810  10/12/18   2100  10/12/18   1720  10/12/18   1210   10/12/18   0802   10/08/18   0812   10/07/18   1325   10/06/18   1012   10/05/18   0912   GLC   --    --    --    --    --    --   112*   --   171*   --   232*   --   170*   --   161*   BGM  167*  148*  160*  134*  85   < >   --    < >   --    < >   --    < >   --    < >   --     < > = values in this interval not displayed.   - Continue Lantus 16U at bedtime.  - Continue aspart 10U TID with meals.  - Restart metformin.  - Continue ISS.    Acute kidney injury, stage stage 2 on CKD stage 3.  Baseline cr around 1.4 previously this year. Cr 2.23 on admit 9/22. Cr steadily improved this hospitalization.   Recent Labs  Lab 10/12/18  0802 10/08/18  0812 10/07/18  1325   CR 1.17 1.21 1.35*   - Monitor BMP.  - Avoid nephrotoxic medications.    PAD.  * H/o lower extremity stents. H/o left lower extremity bypass 2014  * WALLACE 10/1 showed moderate arterial insufficiency on the R.  - Continue Plavix.     Persistent atrial fibrillation.  - Continue metoprolol XL, Plavix.  - Restart warfarin when OK with Vascular.    2nd degree AV Block.   Noted to have abnormal conduction on telemetry 9/22 at [facility]. EKG at that time  "showed sinus rhythm with 2nd degree AV block and appeared to have been present on previous EKG back in 7/2018. Seen by Cardiology this stay.  - F/u with Cardiology.    BPH.  Tamsulosin was recently discontinued due to recurrent falls.  - Monitor clinically.      GERD.  - Continue ranitidine.    Anxiety.  Discontinued Alphatrex recently due to high fall risk.  - Continue citalopram.     Physical deconditioning with h/o mechanical falls due to acute illness/chronic debilitation.  * Admitted from 9/21-9/24 at [facility] for acute on chronic kidney injury and mechanical fall/supratherapeutic INR. He reported that his \"knees give out\".   - Continue PT, OT.     Prophylaxis.  - PCD's, ambulation.    CODE STATUS: FULL.    Dispo.  - Plan TCU possibly 1-2 more days.    # Pain Assessment:  Current Pain Score 10/13/2018   Patient currently in pain? -   Pain score (0-10) 2   Pain location -   Pain descriptors -   Pt having pain from recent surgery.      Interval History   Doing relatively well. Tolerated therapy yesterday. No chest pain or SOB.    -Data reviewed today: I reviewed all new labs and imaging over the last 24 hours. I personally reviewed no images or EKG's today.    Physical Exam   Heart Rate: 67, Blood pressure 126/55, pulse 71, temperature 99  F (37.2  C), temperature source Oral, resp. rate 14, height 1.829 m (6'), weight 79.7 kg (175 lb 11.3 oz), SpO2 97 %.  Vitals:    10/11/18 0548 10/12/18 0635 10/13/18 0600   Weight: 81.6 kg (179 lb 14.3 oz) 79.1 kg (174 lb 6.1 oz) 79.7 kg (175 lb 11.3 oz)     Vital Signs with Ranges  Temp:  [98.2  F (36.8  C)-99.2  F (37.3  C)] 99  F (37.2  C)  Pulse:  [71-84] 71  Heart Rate:  [67-94] 67  Resp:  [14-16] 14  BP: (111-134)/(46-62) 126/55  SpO2:  [94 %-97 %] 97 %  Patient Vitals for the past 24 hrs:   BP Temp Temp src Pulse Heart Rate Resp SpO2 Weight   10/13/18 1550 126/55 99  F (37.2  C) Oral 71 67 14 97 % -   10/13/18 1214 112/46 98.7  F (37.1  C) Oral 71 - 14 94 % -   10/13/18 " 1056 111/53 98.3  F (36.8  C) Oral 84 - - 94 % -   10/13/18 0759 113/60 99.2  F (37.3  C) Oral 74 - 14 96 % -   10/13/18 0600 - - - - - - - 79.7 kg (175 lb 11.3 oz)   10/13/18 0541 - - - - - 16 - -   10/13/18 0400 122/53 98.7  F (37.1  C) Oral - 91 16 94 % -   10/13/18 0200 - - - - - 16 - -   10/13/18 0000 134/62 98.7  F (37.1  C) Oral - 94 16 94 % -   10/12/18 2130 - - - - - 16 - -   10/12/18 1928 124/58 98.2  F (36.8  C) Oral 75 74 16 95 % -     I/O's Last 24 hours  I/O last 3 completed shifts:  In: 850 [P.O.:770; I.V.:80]  Out: 1175 [Urine:1175]    Constitutional: Alert, oriented, pleasant.  Respiratory: Diminished in bases. No crackles or wheezes.  Cardiovascular: Rate normal, no m/r/g.  GI: Soft, nt, nd, +BS.  Skin/Integumen: No left lower extremity edema. Right lower extremity heavily wrapped.  Other:        Data     Recent Labs  Lab 10/13/18  0740 10/12/18  1150 10/12/18  0802 10/11/18  1657 10/11/18  0821 10/08/18  0812 10/07/18  1325   WBC  --   --   --  9.1  --  4.5 6.4   HGB 7.3* 7.6*  --  7.8* 8.4* 9.5* 10.2*   MCV  --   --   --  90  --  89 90   PLT  --   --   --  152 137* 133* 154   INR  --   --   --   --   --  1.20*  --    NA  --   --  137  --   --  136 135   POTASSIUM  --   --  4.1  --  4.1 3.5 3.7   CHLORIDE  --   --  103  --   --  103 100   CO2  --   --  28  --   --  26 27   BUN  --   --  19  --   --  19 20   CR  --   --  1.17  --   --  1.21 1.35*   ANIONGAP  --   --  6  --   --  7 8   JUSTINO  --   --  8.4*  --   --  8.7 8.8   GLC  --   --  112*  --   --  171* 232*     Recent Labs   Lab Test  10/13/18   1218  10/13/18   0810  10/12/18   2100  10/12/18   1720  10/12/18   1210   10/12/18   0802   10/08/18   0812   10/07/18   1325   10/06/18   1012   10/05/18   0912   GLC   --    --    --    --    --    --   112*   --   171*   --   232*   --   170*   --   161*   BGM  167*  148*  160*  134*  85   < >   --    < >   --    < >   --    < >   --    < >   --     < > = values in this interval not displayed.          No results found for this or any previous visit (from the past 24 hour(s)).    Medications   All medications were reviewed.    HYDROmorphone       sodium chloride 10 mL/hr at 10/13/18 0634       amoxicillin-clavulanate  1 tablet Oral Q12H IGGY     atorvastatin  20 mg Oral Daily     cholecalciferol  1,000 Units Oral Daily     citalopram (celeXA) tablet 10 mg  10 mg Oral Daily     clopidogrel  75 mg Oral Daily     ferrous sulfate  325 mg Oral Daily     furosemide  40 mg Oral Daily     insulin aspart  10 Units Subcutaneous TID w/meals     insulin aspart  1-7 Units Subcutaneous TID AC     insulin aspart  1-5 Units Subcutaneous At Bedtime     insulin glargine  16 Units Subcutaneous At Bedtime     melatonin tablet 5 mg  5 mg Oral At Bedtime     metoprolol succinate  100 mg Oral Daily     miconazole   Topical TID     pantoprazole  40 mg Oral BID AC     polyethylene glycol  17 g Oral BID     sennosides  2 tablet Oral BID     sodium chloride (PF)  3 mL Intracatheter Q8H

## 2018-10-13 NOTE — PLAN OF CARE
Problem: Patient Care Overview  Goal: Plan of Care/Patient Progress Review  Outcome: No Change  A+O AVSS on room air. LS diminished. BS active, flatus+. R BKA dressing CDI, boot in place, MICHELLE w/ 0 ml output. Coccyx mepilex in place. Turn q2h. Incontinent of urine x1

## 2018-10-13 NOTE — PLAN OF CARE
Problem: Patient Care Overview  Goal: Plan of Care/Patient Progress Review  Oriented. Sleepy. VSS. HGB 7.3 and received 1 unit Packed Cells. On dilaudid PCA using minimally. Appetite fair. Right leg dressing changed by Dr. Vick. Up in chair for lunch via lift. Voiding.

## 2018-10-13 NOTE — PROGRESS NOTES
POD #2 right BKA    Resting comfortably.  Participated in physical therapy yesterday.  No complaints this morning.    Afebrile   HR 90s   122/53  MICHELLE output recorded as 0 / 24 hrs. as I manipulated the drain I immediately returned greater than 20 cc of dark blood as the drain must have been kinked off.  BKA stump incision is intact.  The stump is clear but slightly edematous.    Hemoglobin yesterday was 7.6.  Morning labs today are pending.    IMPRESSION:  1.  POD #2 right BKA satisfactory progress.    RECOMMENDATION:  -Check morning hemoglobin.  Transfuse for hemoglobin less than 8 given drug-eluting coronary stents this year with CAD.  Heart rate is in the 90s which is relative tachycardia for him.  -Continue MICHELLE drain 1 more day  -Continue physical therapy and present cares.    Solis Vick MD

## 2018-10-13 NOTE — PLAN OF CARE
Problem: Patient Care Overview  Goal: Plan of Care/Patient Progress Review  Outcome: Improving  VSS. A/O. Turned Q2. R BKA dressing CDI. MICHELLE 0 output. Coccyx mapilex CDI. PCA cont minimal pain. Small scab on L knee area, ALISHA. Tele SR BBB.

## 2018-10-14 ENCOUNTER — APPOINTMENT (OUTPATIENT)
Dept: PHYSICAL THERAPY | Facility: CLINIC | Age: 78
DRG: 854 | End: 2018-10-14
Attending: INTERNAL MEDICINE
Payer: MEDICARE

## 2018-10-14 LAB
ERYTHROCYTE [DISTWIDTH] IN BLOOD BY AUTOMATED COUNT: 15.9 % (ref 10–15)
GLUCOSE BLDC GLUCOMTR-MCNC: 112 MG/DL (ref 70–99)
GLUCOSE BLDC GLUCOMTR-MCNC: 125 MG/DL (ref 70–99)
GLUCOSE BLDC GLUCOMTR-MCNC: 138 MG/DL (ref 70–99)
GLUCOSE BLDC GLUCOMTR-MCNC: 144 MG/DL (ref 70–99)
GLUCOSE BLDC GLUCOMTR-MCNC: 153 MG/DL (ref 70–99)
HCT VFR BLD AUTO: 24.2 % (ref 40–53)
HGB BLD-MCNC: 8.2 G/DL (ref 13.3–17.7)
INR PPP: 1.19 (ref 0.86–1.14)
MCH RBC QN AUTO: 30 PG (ref 26.5–33)
MCHC RBC AUTO-ENTMCNC: 33.9 G/DL (ref 31.5–36.5)
MCV RBC AUTO: 89 FL (ref 78–100)
PLATELET # BLD AUTO: 130 10E9/L (ref 150–450)
RBC # BLD AUTO: 2.73 10E12/L (ref 4.4–5.9)
WBC # BLD AUTO: 6.5 10E9/L (ref 4–11)

## 2018-10-14 PROCEDURE — 40000193 ZZH STATISTIC PT WARD VISIT: Performed by: PHYSICAL THERAPIST

## 2018-10-14 PROCEDURE — 25000128 H RX IP 250 OP 636: Performed by: SURGERY

## 2018-10-14 PROCEDURE — 85027 COMPLETE CBC AUTOMATED: CPT | Performed by: SURGERY

## 2018-10-14 PROCEDURE — 00000146 ZZHCL STATISTIC GLUCOSE BY METER IP

## 2018-10-14 PROCEDURE — A9270 NON-COVERED ITEM OR SERVICE: HCPCS | Mod: GY | Performed by: INTERNAL MEDICINE

## 2018-10-14 PROCEDURE — 12000007 ZZH R&B INTERMEDIATE

## 2018-10-14 PROCEDURE — 25000132 ZZH RX MED GY IP 250 OP 250 PS 637: Mod: GY | Performed by: INTERNAL MEDICINE

## 2018-10-14 PROCEDURE — 99207 ZZC CDG-MDM COMPONENT: MEETS MODERATE - UP CODED: CPT | Performed by: INTERNAL MEDICINE

## 2018-10-14 PROCEDURE — 97530 THERAPEUTIC ACTIVITIES: CPT | Mod: GP | Performed by: PHYSICAL THERAPIST

## 2018-10-14 PROCEDURE — 99232 SBSQ HOSP IP/OBS MODERATE 35: CPT | Performed by: INTERNAL MEDICINE

## 2018-10-14 PROCEDURE — 25000131 ZZH RX MED GY IP 250 OP 636 PS 637: Mod: GY | Performed by: INTERNAL MEDICINE

## 2018-10-14 PROCEDURE — A9270 NON-COVERED ITEM OR SERVICE: HCPCS | Mod: GY | Performed by: SURGERY

## 2018-10-14 PROCEDURE — 25000132 ZZH RX MED GY IP 250 OP 250 PS 637: Mod: GY | Performed by: SURGERY

## 2018-10-14 PROCEDURE — 85610 PROTHROMBIN TIME: CPT | Performed by: SURGERY

## 2018-10-14 PROCEDURE — 97110 THERAPEUTIC EXERCISES: CPT | Mod: GP | Performed by: PHYSICAL THERAPIST

## 2018-10-14 PROCEDURE — 36415 COLL VENOUS BLD VENIPUNCTURE: CPT | Performed by: SURGERY

## 2018-10-14 RX ORDER — WARFARIN SODIUM 7.5 MG/1
7.5 TABLET ORAL
Status: COMPLETED | OUTPATIENT
Start: 2018-10-14 | End: 2018-10-14

## 2018-10-14 RX ADMIN — FUROSEMIDE 40 MG: 40 TABLET ORAL at 08:23

## 2018-10-14 RX ADMIN — ATORVASTATIN CALCIUM 20 MG: 20 TABLET, FILM COATED ORAL at 08:23

## 2018-10-14 RX ADMIN — WARFARIN SODIUM 7.5 MG: 7.5 TABLET ORAL at 18:16

## 2018-10-14 RX ADMIN — AMOXICILLIN AND CLAVULANATE POTASSIUM 1 TABLET: 875; 125 TABLET, FILM COATED ORAL at 19:35

## 2018-10-14 RX ADMIN — PANTOPRAZOLE SODIUM 40 MG: 40 TABLET, DELAYED RELEASE ORAL at 18:15

## 2018-10-14 RX ADMIN — CLOPIDOGREL 75 MG: 75 TABLET, FILM COATED ORAL at 08:23

## 2018-10-14 RX ADMIN — AMOXICILLIN AND CLAVULANATE POTASSIUM 1 TABLET: 875; 125 TABLET, FILM COATED ORAL at 08:23

## 2018-10-14 RX ADMIN — METFORMIN HYDROCHLORIDE 500 MG: 500 TABLET ORAL at 18:15

## 2018-10-14 RX ADMIN — MICONAZOLE NITRATE: 2 POWDER TOPICAL at 21:57

## 2018-10-14 RX ADMIN — PANTOPRAZOLE SODIUM 40 MG: 40 TABLET, DELAYED RELEASE ORAL at 06:44

## 2018-10-14 RX ADMIN — INSULIN GLARGINE 16 UNITS: 100 INJECTION, SOLUTION SUBCUTANEOUS at 21:55

## 2018-10-14 RX ADMIN — METFORMIN HYDROCHLORIDE 500 MG: 500 TABLET ORAL at 08:23

## 2018-10-14 RX ADMIN — MELATONIN 5 MG TABLET 5 MG: at 21:55

## 2018-10-14 RX ADMIN — Medication 1 LOZENGE: at 06:48

## 2018-10-14 RX ADMIN — INSULIN ASPART 1 UNITS: 100 INJECTION, SOLUTION INTRAVENOUS; SUBCUTANEOUS at 09:06

## 2018-10-14 RX ADMIN — SENNOSIDES 2 TABLET: 8.6 TABLET, FILM COATED ORAL at 08:23

## 2018-10-14 RX ADMIN — CITALOPRAM HYDROBROMIDE 10 MG: 10 TABLET ORAL at 08:23

## 2018-10-14 RX ADMIN — INSULIN ASPART 1 UNITS: 100 INJECTION, SOLUTION INTRAVENOUS; SUBCUTANEOUS at 18:16

## 2018-10-14 RX ADMIN — FERROUS SULFATE TAB 325 MG (65 MG ELEMENTAL FE) 325 MG: 325 (65 FE) TAB at 08:23

## 2018-10-14 RX ADMIN — METOPROLOL SUCCINATE 100 MG: 100 TABLET, EXTENDED RELEASE ORAL at 21:55

## 2018-10-14 RX ADMIN — VITAMIN D, TAB 1000IU (100/BT) 1000 UNITS: 25 TAB at 08:23

## 2018-10-14 RX ADMIN — POLYETHYLENE GLYCOL 3350 17 G: 17 POWDER, FOR SOLUTION ORAL at 08:25

## 2018-10-14 RX ADMIN — SODIUM CHLORIDE: 9 INJECTION, SOLUTION INTRAVENOUS at 06:23

## 2018-10-14 RX ADMIN — MICONAZOLE NITRATE: 2 POWDER TOPICAL at 08:25

## 2018-10-14 ASSESSMENT — ACTIVITIES OF DAILY LIVING (ADL)
ADLS_ACUITY_SCORE: 17

## 2018-10-14 ASSESSMENT — PAIN DESCRIPTION - DESCRIPTORS: DESCRIPTORS: ACHING

## 2018-10-14 NOTE — PLAN OF CARE
Problem: Patient Care Overview  Goal: Plan of Care/Patient Progress Review  Outcome: No Change  Pt A&Ox4.  VSS on RA.  Tele SR w/BBB.  Pain managed w/Dilaudid PCA pump.  Up w/A2 and lift.  BG = 153 at HS.  Mod carb diet.  Small scab on left knee.  Lung sounds diminished.  NS infusing at 10mL/hr.  Nursing to continue to monitor.

## 2018-10-14 NOTE — PLAN OF CARE
Problem: Patient Care Overview  Goal: Plan of Care/Patient Progress Review  Outcome: Improving  A+O AVSS on room air. LS diminished. BS active, flatus+. Pain controlled w/ PCA. Turned q2h. Tele SR w/ BBB. CocA+O AVSS on room air. LS diminished. BS active, flatus+. Pain controlled w/ PCA. Turned q2h. Tele SR w/ BBB. Coccyx mepilex in place. BKA dressing CDI.cyx mepilex in place. BKA dressing CDI. Incontinent of bowel and bladder.

## 2018-10-14 NOTE — PROGRESS NOTES
[facility]    Internal Medicine Hospitalist Progress Note  10/14/2018  I evaluated patient on the above date.    Abdiel Alejandro Jr., MD  [phone] (p)  Text Page      Assessment & Plan   Tad Manning is a 77 year old male history including DM2 with neuropathy, HTN, CAD, CHF, PAD, CKD and atrial fibrillation, who presented 9/30 with R foot wound with concerns for osteomyelitis.    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  Patient transferred from [facility] to [facility] 9/30 for further evaluation of non-healing right foot ulcer. Started on vanco and Zosyn on admit. Seen by Vascular Surgery. MRI right foot 10/10 howed plantar lateral skin ulcer at the level of the fifth metatarsal head with devitalized soft tissue thickening distal to the ulcer in the plantar fifth toe; nonspecific mild bone marrow edema in the fifth metatarsal head and proximal phalanx base may be reactive change alone but early osteomyelitis not excluded. US WALLACE 10/1 showed left within normal limits, right suggested moderate arterial insufficiency. Delay in surgery due to cardiac issues (see below). Ultimately underwent R BKA 10/11. Vanco discontinued 10/9. Zosyn changed to Augmentin 10/12.  - Post-op management per Vascular.  - Continue Augmentin (started 10/12) - plan stop after 10/14.    Acute blood loss anemia on chronic anemia.  Baseline hgb around 9-10 range. Iron studies 10/1 suggested ACD +/- iron deficiency. TSH, B12, folate on 10/12 were normal. Received prbc's 10/3, 10/4, 10/8, 10/13.   Recent Labs  Lab 10/14/18  0757 10/13/18  0740 10/12/18  1150 10/11/18  1657 10/11/18  0821 10/08/18  0812   HGB 8.2* 7.3* 7.6* 7.8* 8.4* 9.5*   - Monitor CBC.  - Consider prbc transfusion if hgb </= 7.0 or if significant bleeding with hemodynamic instability or if  symptomatic.    Ischemic cardiomyopathy with acute on chronic biventricular systolic CHF exacerbation.  Hypertension (benign essential).  [PTA BP meds/diuretics: furosemide 40 mg daily, metoprolol  mg daily.]  * S/p 3-vessel CABG in 1995. S/p PCI to mLAD 3/2018. Echo 7/2018 showed LVEF 25%.  * Seen by Cardiology this stay. Echo 10/5 showed LVEF 35-45%; RV systolic function mildly decreased. Diuresed with IV Lasix this stay.  * Wt (admit wt 177 lbs 9/30): 176 lbs 10/14 <-- 175 lbs 10/13 <-- 174 lbs <-- 179 lbs.  - Continue atorvastatin, clopidogrel, furosemide, metoprolol XL, PRN NTG.  - Monitor i/o's, daily wts.    DM2 with vascular complications.  [PTA: metformin 500 mg BID.]  Hgb A1C 9.0 9/22. Started on Lantus this stay.  Recent Labs   Lab Test  10/14/18   0846  10/14/18   0410  10/13/18   2109  10/13/18   1741  10/13/18   1218   10/12/18   0802   10/08/18   0812   10/07/18   1325   10/06/18   1012   10/05/18   0912   GLC   --    --    --    --    --    --   112*   --   171*   --   232*   --   170*   --   161*   BGM  144*  125*  153*  114*  167*   < >   --    < >   --    < >   --    < >   --    < >   --     < > = values in this interval not displayed.   - Continue Lantus 16U at bedtime.  - Continue aspart 10U TID with meals.  - Continue metformin 500 mg BID.  - Continue ISS.    Acute kidney injury, stage stage 2 on CKD stage 3.  Baseline cr around 1.4 previously this year. Cr 2.23 on admit 9/22. Cr steadily improved this hospitalization.   Recent Labs  Lab 10/12/18  0802 10/08/18  0812 10/07/18  1325   CR 1.17 1.21 1.35*   - Monitor BMP.  - Avoid nephrotoxic medications.    PAD.  * H/o lower extremity stents. H/o left lower extremity bypass 2014  * WALLACE 10/1 showed moderate arterial insufficiency on the R.  - Continue Plavix.     Persistent atrial fibrillation.  - Continue metoprolol XL, Plavix.  - Restart warfarin (I d/w Vascular 10/14).    2nd degree AV Block.   Noted to have abnormal conduction on  "telemetry 9/22 at [facility]. EKG at that time showed sinus rhythm with 2nd degree AV block and appeared to have been present on previous EKG back in 7/2018. Seen by Cardiology this stay.  - F/u with Cardiology.    BPH.  Tamsulosin was recently discontinued due to recurrent falls.  - Monitor clinically.      GERD.  - Continue ranitidine.    Anxiety.  Discontinued Alphatrex recently due to high fall risk.  - Continue citalopram.     Physical deconditioning with h/o mechanical falls due to acute illness/chronic debilitation.  * Admitted from 9/21-9/24 at [facility] for acute on chronic kidney injury and mechanical fall/supratherapeutic INR. He reported that his \"knees give out\".   - Continue PT, OT.     Prophylaxis.  - PCD's, ambulation.    CODE STATUS: FULL.    Dispo.  - Plan TCU possibly 1-2 more days when OK with Vascular.    # Pain Assessment:  Current Pain Score 10/14/2018   Patient currently in pain? yes   Pain score (0-10) 2   Pain location Leg   Pain descriptors Aching   Pt having pain from recent surgery.      Interval History   Doing OK. Denies chest pain or SOB.    -Data reviewed today: I reviewed all new labs and imaging over the last 24 hours. I personally reviewed no images or EKG's today.    Physical Exam   Heart Rate: 68, Blood pressure 133/60, pulse 73, temperature 98.7  F (37.1  C), temperature source Oral, resp. rate 16, height 1.829 m (6'), weight 80.2 kg (176 lb 12.9 oz), SpO2 97 %.  Vitals:    10/12/18 0635 10/13/18 0600 10/14/18 0600   Weight: 79.1 kg (174 lb 6.1 oz) 79.7 kg (175 lb 11.3 oz) 80.2 kg (176 lb 12.9 oz)     Vital Signs with Ranges  Temp:  [97.9  F (36.6  C)-99  F (37.2  C)] 98.7  F (37.1  C)  Pulse:  [71-73] 73  Heart Rate:  [64-68] 68  Resp:  [14-16] 16  BP: (112-133)/(54-60) 133/60  SpO2:  [95 %-98 %] 97 %  Patient Vitals for the past 24 hrs:   BP Temp Temp src Pulse Heart Rate Resp SpO2 Weight   10/14/18 1102 133/60 98.7  F (37.1  C) Oral - 68 16 97 % -   10/14/18 0745 - - - - - 16 - - "   10/14/18 0729 122/58 98.9  F (37.2  C) Axillary - 64 16 95 % -   10/14/18 0600 - - - - - - - 80.2 kg (176 lb 12.9 oz)   10/14/18 0400 - - - - - 14 - -   10/14/18 0200 - - - - - 14 - -   10/14/18 0000 130/54 97.9  F (36.6  C) Oral - 66 16 98 % -   10/13/18 2017 - - - - - 16 - -   10/13/18 1938 112/55 98.9  F (37.2  C) Oral 73 - 16 97 % -   10/13/18 1550 126/55 99  F (37.2  C) Oral 71 67 14 97 % -     I/O's Last 24 hours  I/O last 3 completed shifts:  In: 900 [P.O.:820; I.V.:80]  Out: 745 [Urine:700; Drains:45]    Constitutional: Alert, oriented, pleasant.  Respiratory: Diminished in bases. No crackles or wheezes.  Cardiovascular: Rate normal, no m/r/g.  GI:   Skin/Integumen:   Other:        Data     Recent Labs  Lab 10/14/18  0757 10/13/18  0740 10/12/18  1150 10/12/18  0802 10/11/18  1657 10/11/18  0821 10/08/18  0812 10/07/18  1325   WBC 6.5  --   --   --  9.1  --  4.5 6.4   HGB 8.2* 7.3* 7.6*  --  7.8* 8.4* 9.5* 10.2*   MCV 89  --   --   --  90  --  89 90   *  --   --   --  152 137* 133* 154   INR 1.19*  --   --   --   --   --  1.20*  --    NA  --   --   --  137  --   --  136 135   POTASSIUM  --   --   --  4.1  --  4.1 3.5 3.7   CHLORIDE  --   --   --  103  --   --  103 100   CO2  --   --   --  28  --   --  26 27   BUN  --   --   --  19  --   --  19 20   CR  --   --   --  1.17  --   --  1.21 1.35*   ANIONGAP  --   --   --  6  --   --  7 8   JUSTINO  --   --   --  8.4*  --   --  8.7 8.8   GLC  --   --   --  112*  --   --  171* 232*     Recent Labs   Lab Test  10/14/18   0846  10/14/18   0410  10/13/18   2109  10/13/18   1741  10/13/18   1218   10/12/18   0802   10/08/18   0812   10/07/18   1325   10/06/18   1012   10/05/18   0912   GLC   --    --    --    --    --    --   112*   --   171*   --   232*   --   170*   --   161*   BGM  144*  125*  153*  114*  167*   < >   --    < >   --    < >   --    < >   --    < >   --     < > = values in this interval not displayed.         No results found for this or any  previous visit (from the past 24 hour(s)).    Medications   All medications were reviewed.    HYDROmorphone       sodium chloride 10 mL/hr at 10/14/18 0623       amoxicillin-clavulanate  1 tablet Oral Q12H IGGY     atorvastatin  20 mg Oral Daily     cholecalciferol  1,000 Units Oral Daily     citalopram (celeXA) tablet 10 mg  10 mg Oral Daily     clopidogrel  75 mg Oral Daily     ferrous sulfate  325 mg Oral Daily     furosemide  40 mg Oral Daily     insulin aspart  10 Units Subcutaneous TID w/meals     insulin aspart  1-7 Units Subcutaneous TID AC     insulin aspart  1-5 Units Subcutaneous At Bedtime     insulin glargine  16 Units Subcutaneous At Bedtime     melatonin tablet 5 mg  5 mg Oral At Bedtime     metFORMIN  500 mg Oral BID w/meals     metoprolol succinate  100 mg Oral Daily     miconazole   Topical TID     pantoprazole  40 mg Oral BID AC     polyethylene glycol  17 g Oral BID     sennosides  2 tablet Oral BID     sodium chloride (PF)  3 mL Intracatheter Q8H

## 2018-10-14 NOTE — PROGRESS NOTES
POD #3 right BKA    Resting comfortably.  No complaints this morning.  Afebrile.   Heart rate back down in the 60s after receiving 1 unit of packed red cells yesterday.  Systolic blood pressure in the 120s.  BKA stump MICHELLE output 45 cc of blood last 24 hours  Right BKA stump mildly edematous.  The incision is clear with good skin edge apposition.  Labs pending    IMPRESSION:  POD #3 right BKA satisfactory progress.    PLAN:   -Continue MICHELLE drain for 1 more day.  -Physical therapy/Occupational Therapy  -Begin disposition planning    Solis Vick MD

## 2018-10-14 NOTE — PLAN OF CARE
Problem: Patient Care Overview  Goal: Plan of Care/Patient Progress Review  Pt a/o x 4. VSS. Pain well controlled with PCA pump. Dressing changed this morning with surgery. Dressing CDI. MICHELLE drain in place. BS checks, scheduled insulin. Uses urinal at bedside. Tolerating mod CHO diet, up with A2 pivot.

## 2018-10-14 NOTE — PHARMACY-ANTICOAGULATION SERVICE
Clinical Pharmacy - Warfarin Dosing Consult     Pharmacy has been consulted to manage this patient s warfarin therapy.  Indication: Atrial Fibrillation  Therapy Goal: INR 2-3  Warfarin Prior to Admission: Yes  Warfarin PTA Regimen: 7.5mg MWF; 5mg rest  Significant drug interactions: Augmentin, plavix, celexa  Recent documented change in oral intake/nutrition: Unknown  Dose Comments: give 7.5mg tonight - pt has missed several doses    INR   Date Value Ref Range Status   10/14/2018 1.19 (H) 0.86 - 1.14 Final   10/08/2018 1.20 (H) 0.86 - 1.14 Final       Recommend warfarin 7.5 mg today.  Pharmacy will monitor Tad Manning daily and order warfarin doses to achieve specified goal.      Please contact pharmacy as soon as possible if the warfarin needs to be held for a procedure or if the warfarin goals change.      Barbara Newell, PharmD

## 2018-10-14 NOTE — PROGRESS NOTES
SW:    D: SW following for discharge needs. TYSON received call from admissions staff at [facility]. Patient accepted for tomorrow.     P: Will continue to follow

## 2018-10-14 NOTE — PLAN OF CARE
Problem: Patient Care Overview  Goal: Plan of Care/Patient Progress Review  Discharge Planner PT   Patient plan for discharge: None stated.  Current status: Sup>sit Min A. Sit<>stand with walker Min A x 2. Tolerates BKA ex in supine. Up to chair via liko lift and universal sling. Motivated.  Barriers to return to prior living situation: Level of assist, fatigue, impaired balance and activity tolerance.  Recommendations for discharge: Acute Rehab  Rationale for recommendations: Pt will benefit from continued skilled rehab services to progress independence and safety with functional mobility prior to discharge home.        Entered by: Nancy Pearson 10/14/2018 5:18 PM

## 2018-10-15 ENCOUNTER — APPOINTMENT (OUTPATIENT)
Dept: PHYSICAL THERAPY | Facility: CLINIC | Age: 78
DRG: 854 | End: 2018-10-15
Attending: INTERNAL MEDICINE
Payer: MEDICARE

## 2018-10-15 ENCOUNTER — APPOINTMENT (OUTPATIENT)
Dept: OCCUPATIONAL THERAPY | Facility: CLINIC | Age: 78
DRG: 854 | End: 2018-10-15
Attending: SURGERY
Payer: MEDICARE

## 2018-10-15 LAB
GLUCOSE BLDC GLUCOMTR-MCNC: 119 MG/DL (ref 70–99)
GLUCOSE BLDC GLUCOMTR-MCNC: 123 MG/DL (ref 70–99)
GLUCOSE BLDC GLUCOMTR-MCNC: 125 MG/DL (ref 70–99)
GLUCOSE BLDC GLUCOMTR-MCNC: 157 MG/DL (ref 70–99)
HGB BLD-MCNC: 8.1 G/DL (ref 13.3–17.7)
INR PPP: 1.11 (ref 0.86–1.14)

## 2018-10-15 PROCEDURE — A9270 NON-COVERED ITEM OR SERVICE: HCPCS | Mod: GY | Performed by: SURGERY

## 2018-10-15 PROCEDURE — A9270 NON-COVERED ITEM OR SERVICE: HCPCS | Mod: GY | Performed by: INTERNAL MEDICINE

## 2018-10-15 PROCEDURE — 00000146 ZZHCL STATISTIC GLUCOSE BY METER IP

## 2018-10-15 PROCEDURE — 25000132 ZZH RX MED GY IP 250 OP 250 PS 637: Mod: GY | Performed by: SURGERY

## 2018-10-15 PROCEDURE — 25000131 ZZH RX MED GY IP 250 OP 636 PS 637: Mod: GY | Performed by: INTERNAL MEDICINE

## 2018-10-15 PROCEDURE — 85610 PROTHROMBIN TIME: CPT | Performed by: INTERNAL MEDICINE

## 2018-10-15 PROCEDURE — 25000132 ZZH RX MED GY IP 250 OP 250 PS 637: Mod: GY | Performed by: INTERNAL MEDICINE

## 2018-10-15 PROCEDURE — 99207 ZZC CDG-MDM COMPONENT: MEETS MODERATE - UP CODED: CPT | Performed by: INTERNAL MEDICINE

## 2018-10-15 PROCEDURE — 97530 THERAPEUTIC ACTIVITIES: CPT | Mod: GP | Performed by: PHYSICAL THERAPIST

## 2018-10-15 PROCEDURE — 97110 THERAPEUTIC EXERCISES: CPT | Mod: GP | Performed by: PHYSICAL THERAPIST

## 2018-10-15 PROCEDURE — 97166 OT EVAL MOD COMPLEX 45 MIN: CPT | Mod: GO | Performed by: OCCUPATIONAL THERAPIST

## 2018-10-15 PROCEDURE — 99232 SBSQ HOSP IP/OBS MODERATE 35: CPT | Performed by: INTERNAL MEDICINE

## 2018-10-15 PROCEDURE — 40000133 ZZH STATISTIC OT WARD VISIT: Performed by: OCCUPATIONAL THERAPIST

## 2018-10-15 PROCEDURE — 40000193 ZZH STATISTIC PT WARD VISIT: Performed by: PHYSICAL THERAPIST

## 2018-10-15 PROCEDURE — 12000007 ZZH R&B INTERMEDIATE

## 2018-10-15 PROCEDURE — 36415 COLL VENOUS BLD VENIPUNCTURE: CPT | Performed by: INTERNAL MEDICINE

## 2018-10-15 PROCEDURE — 85018 HEMOGLOBIN: CPT | Performed by: INTERNAL MEDICINE

## 2018-10-15 PROCEDURE — 97535 SELF CARE MNGMENT TRAINING: CPT | Mod: GO | Performed by: OCCUPATIONAL THERAPIST

## 2018-10-15 RX ORDER — PANTOPRAZOLE SODIUM 40 MG/1
40 TABLET, DELAYED RELEASE ORAL DAILY
Qty: 30 TABLET | DISCHARGE
Start: 2018-10-15 | End: 2019-01-14

## 2018-10-15 RX ORDER — SENNOSIDES 8.6 MG
2 TABLET ORAL 2 TIMES DAILY
Qty: 120 EACH | DISCHARGE
Start: 2018-10-15 | End: 2018-10-24

## 2018-10-15 RX ORDER — OXYCODONE HYDROCHLORIDE 5 MG/1
5-10 TABLET ORAL EVERY 4 HOURS PRN
Qty: 20 TABLET | Refills: 0 | Status: SHIPPED | DISCHARGE
Start: 2018-10-15 | End: 2021-01-11

## 2018-10-15 RX ORDER — WARFARIN SODIUM 7.5 MG/1
7.5 TABLET ORAL
Status: COMPLETED | OUTPATIENT
Start: 2018-10-15 | End: 2018-10-15

## 2018-10-15 RX ORDER — POLYETHYLENE GLYCOL 3350 17 G/17G
17 POWDER, FOR SOLUTION ORAL 2 TIMES DAILY
Qty: 7 PACKET | DISCHARGE
Start: 2018-10-15 | End: 2018-10-24

## 2018-10-15 RX ORDER — FERROUS SULFATE 325(65) MG
325 TABLET ORAL DAILY
Qty: 100 TABLET | DISCHARGE
Start: 2018-10-16 | End: 2020-05-13

## 2018-10-15 RX ORDER — BISACODYL 10 MG
10 SUPPOSITORY, RECTAL RECTAL DAILY PRN
Qty: 30 SUPPOSITORY | DISCHARGE
Start: 2018-10-15 | End: 2018-11-27

## 2018-10-15 RX ADMIN — OXYCODONE HYDROCHLORIDE 5 MG: 5 TABLET ORAL at 13:58

## 2018-10-15 RX ADMIN — INSULIN GLARGINE 16 UNITS: 100 INJECTION, SOLUTION SUBCUTANEOUS at 21:41

## 2018-10-15 RX ADMIN — CITALOPRAM HYDROBROMIDE 10 MG: 10 TABLET ORAL at 08:10

## 2018-10-15 RX ADMIN — METFORMIN HYDROCHLORIDE 500 MG: 500 TABLET ORAL at 17:53

## 2018-10-15 RX ADMIN — MICONAZOLE NITRATE: 2 POWDER TOPICAL at 16:49

## 2018-10-15 RX ADMIN — METFORMIN HYDROCHLORIDE 500 MG: 500 TABLET ORAL at 08:10

## 2018-10-15 RX ADMIN — MELATONIN 5 MG TABLET 5 MG: at 21:41

## 2018-10-15 RX ADMIN — FUROSEMIDE 40 MG: 40 TABLET ORAL at 08:10

## 2018-10-15 RX ADMIN — WARFARIN SODIUM 7.5 MG: 7.5 TABLET ORAL at 17:12

## 2018-10-15 RX ADMIN — CLOPIDOGREL 75 MG: 75 TABLET, FILM COATED ORAL at 08:10

## 2018-10-15 RX ADMIN — IBUPROFEN 600 MG: 600 TABLET, FILM COATED ORAL at 16:07

## 2018-10-15 RX ADMIN — ATORVASTATIN CALCIUM 20 MG: 20 TABLET, FILM COATED ORAL at 08:10

## 2018-10-15 RX ADMIN — METOPROLOL SUCCINATE 100 MG: 100 TABLET, EXTENDED RELEASE ORAL at 20:43

## 2018-10-15 RX ADMIN — MICONAZOLE NITRATE: 2 POWDER TOPICAL at 09:06

## 2018-10-15 RX ADMIN — FERROUS SULFATE TAB 325 MG (65 MG ELEMENTAL FE) 325 MG: 325 (65 FE) TAB at 08:10

## 2018-10-15 RX ADMIN — VITAMIN D, TAB 1000IU (100/BT) 1000 UNITS: 25 TAB at 08:10

## 2018-10-15 RX ADMIN — PANTOPRAZOLE SODIUM 40 MG: 40 TABLET, DELAYED RELEASE ORAL at 06:44

## 2018-10-15 RX ADMIN — PANTOPRAZOLE SODIUM 40 MG: 40 TABLET, DELAYED RELEASE ORAL at 17:12

## 2018-10-15 RX ADMIN — INSULIN ASPART 1 UNITS: 100 INJECTION, SOLUTION INTRAVENOUS; SUBCUTANEOUS at 09:05

## 2018-10-15 RX ADMIN — AMOXICILLIN AND CLAVULANATE POTASSIUM 1 TABLET: 875; 125 TABLET, FILM COATED ORAL at 08:11

## 2018-10-15 ASSESSMENT — ACTIVITIES OF DAILY LIVING (ADL)
ADLS_ACUITY_SCORE: 17
PREVIOUS_RESPONSIBILITIES: MEAL PREP;HOUSEKEEPING;MEDICATION MANAGEMENT;FINANCES;DRIVING
ADLS_ACUITY_SCORE: 17

## 2018-10-15 NOTE — PROGRESS NOTES
[facility]    Internal Medicine Hospitalist Progress Note  10/15/2018  I evaluated patient on the above date.    Abdiel Alejandro Jr., MD  [phone] (p)  Text Page      Assessment & Plan   Tad Manning is a 77 year old male history including DM2 with neuropathy, HTN, CAD, CHF, PAD, CKD and atrial fibrillation, who presented 9/30 with R foot wound with concerns for osteomyelitis.    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  Patient transferred from [facility] to [facility] 9/30 for further evaluation of non-healing right foot ulcer. Started on vanco and Zosyn on admit. Seen by Vascular Surgery. MRI right foot 10/10 howed plantar lateral skin ulcer at the level of the fifth metatarsal head with devitalized soft tissue thickening distal to the ulcer in the plantar fifth toe; nonspecific mild bone marrow edema in the fifth metatarsal head and proximal phalanx base may be reactive change alone but early osteomyelitis not excluded. US WALLACE 10/1 showed left within normal limits, right suggested moderate arterial insufficiency. Delay in surgery due to cardiac issues (see below). Ultimately underwent R BKA 10/11. Vanco discontinued 10/9. Zosyn changed to Augmentin 10/12.  - Post-op management per Vascular.  - Continue Augmentin (started 10/12) - plan stop after 10/14.    Acute blood loss anemia on chronic anemia.  Baseline hgb around 9-10 range. Iron studies 10/1 suggested ACD +/- iron deficiency. TSH, B12, folate on 10/12 were normal. Received prbc's 10/3, 10/4, 10/8, 10/13.   Recent Labs  Lab 10/15/18  0819 10/14/18  0757 10/13/18  0740 10/12/18  1150 10/11/18  1657 10/11/18  0821   HGB 8.1* 8.2* 7.3* 7.6* 7.8* 8.4*   - Monitor CBC.  - Consider prbc transfusion if hgb </= 7.0 or if significant bleeding with hemodynamic instability or if  symptomatic.    Ischemic cardiomyopathy with acute on chronic biventricular systolic CHF exacerbation.  Hypertension (benign essential).  [PTA BP meds/diuretics: furosemide 40 mg daily, metoprolol  mg daily.]  * S/p 3-vessel CABG in 1995. S/p PCI to mLAD 3/2018. Echo 7/2018 showed LVEF 25%.  * Seen by Cardiology this stay. Echo 10/5 showed LVEF 35-45%; RV systolic function mildly decreased. Diuresed with IV Lasix this stay.  * Wt (admit wt 177 lbs 9/30): 176 lbs 10/14 <-- 175 lbs 10/13 <-- 174 lbs <-- 179 lbs.  - Continue atorvastatin, clopidogrel, furosemide, metoprolol XL, PRN NTG.  - Monitor i/o's, daily wts.    DM2 with vascular complications.  [PTA: metformin 500 mg BID.]  Hgb A1C 9.0 9/22. Started on Lantus this stay.  Recent Labs  Lab 10/15/18  1230 10/15/18  0803 10/14/18  2139 10/14/18  1801 10/14/18  1255  10/12/18  0802   GLC  --   --   --   --   --   --  112*   * 157* 138* 153* 112*  < >  --    < > = values in this interval not displayed.  - Continue Lantus 16U at bedtime.  - Continue aspart 10U TID with meals.  - Continue metformin 500 mg BID.  - Continue ISS.    Acute kidney injury, stage stage 2 on CKD stage 3.  Baseline cr around 1.4 previously this year. Cr 2.23 on admit 9/22. Cr steadily improved this hospitalization.   Recent Labs  Lab 10/12/18  0802   CR 1.17   - Monitor BMP.  - Avoid nephrotoxic medications.    PAD.  * H/o lower extremity stents. H/o left lower extremity bypass 2014  * WALLACE 10/1 showed moderate arterial insufficiency on the R.  - Continue Plavix.     Persistent atrial fibrillation.  Restarted warfarin 10/14.  - Continue metoprolol XL, warfarin.    2nd degree AV Block.   Noted to have abnormal conduction on telemetry 9/22 at [facility]. EKG at that time showed sinus rhythm with 2nd degree AV block and appeared to have been present on previous EKG back in 7/2018. Seen by Cardiology this stay.  - F/u with Cardiology.    BPH.  Tamsulosin was recently discontinued due to  "recurrent falls.  - Monitor clinically.      GERD.  - Continue pantoprazole.    Anxiety.  Discontinued Alphatrex recently due to high fall risk.  - Continue citalopram.     Physical deconditioning with h/o mechanical falls due to acute illness/chronic debilitation.  * Admitted from 9/21-9/24 at [facility] for acute on chronic kidney injury and mechanical fall/supratherapeutic INR. He reported that his \"knees give out\".   - Continue PT, OT.     Prophylaxis.  - PCD's, ambulation.    CODE STATUS: FULL.    Dispo.  - Plan TCU possibly 1-2 more days when OK with Vascular.    # Pain Assessment:  Current Pain Score 10/15/2018   Patient currently in pain? -   Pain score (0-10) 4   Pain location -   Pain descriptors -   Pt having pain from recent surgery.      Interval History   Doing relatively well. Working with PT now.    -Data reviewed today: I reviewed all new labs and imaging over the last 24 hours. I personally reviewed no images or EKG's today.    Physical Exam   Heart Rate: 76, Blood pressure 139/72, pulse 76, temperature 98.1  F (36.7  C), temperature source Oral, resp. rate 16, height 1.829 m (6'), weight 80.2 kg (176 lb 12.9 oz), SpO2 94 %.  Vitals:    10/13/18 0600 10/14/18 0600 10/15/18 0621   Weight: 79.7 kg (175 lb 11.3 oz) 80.2 kg (176 lb 12.9 oz) 80.2 kg (176 lb 12.9 oz)     Vital Signs with Ranges  Temp:  [97.8  F (36.6  C)-98.7  F (37.1  C)] 98.1  F (36.7  C)  Pulse:  [69-76] 76  Heart Rate:  [69-80] 76  Resp:  [16-18] 16  BP: (129-145)/(60-72) 139/72  SpO2:  [94 %-98 %] 94 %  Patient Vitals for the past 24 hrs:   BP Temp Temp src Pulse Heart Rate Resp SpO2 Weight   10/15/18 1202 139/72 98.1  F (36.7  C) Oral 76 - 16 94 % -   10/15/18 0805 129/60 98.7  F (37.1  C) Oral 70 - 16 95 % -   10/15/18 0621 - - - - - - - 80.2 kg (176 lb 12.9 oz)   10/15/18 0554 - - - - - 16 - -   10/15/18 0400 - - - - - 16 - -   10/15/18 0200 - - - - - 16 - -   10/15/18 0000 132/61 97.8  F (36.6  C) Oral - 76 16 96 % -   10/14/18 2153 " 141/66 - - - - - - -   10/14/18 1924 145/65 98.6  F (37  C) Oral - 80 18 98 % -   10/14/18 1537 142/62 98  F (36.7  C) Oral 69 69 18 98 % -     I/O's Last 24 hours  I/O last 3 completed shifts:  In: 816.17 [P.O.:570; I.V.:246.17]  Out: 1300 [Urine:1275; Drains:25]    Constitutional: Alert, oriented, pleasant.  Respiratory:   Cardiovascular:   GI:   Skin/Integumen:   Other:        Data     Recent Labs  Lab 10/15/18  0819 10/14/18  0757 10/13/18  0740  10/12/18  0802 10/11/18  1657 10/11/18  0821   WBC  --  6.5  --   --   --  9.1  --    HGB 8.1* 8.2* 7.3*  < >  --  7.8* 8.4*   MCV  --  89  --   --   --  90  --    PLT  --  130*  --   --   --  152 137*   INR 1.11 1.19*  --   --   --   --   --    NA  --   --   --   --  137  --   --    POTASSIUM  --   --   --   --  4.1  --  4.1   CHLORIDE  --   --   --   --  103  --   --    CO2  --   --   --   --  28  --   --    BUN  --   --   --   --  19  --   --    CR  --   --   --   --  1.17  --   --    ANIONGAP  --   --   --   --  6  --   --    JUSTINO  --   --   --   --  8.4*  --   --    GLC  --   --   --   --  112*  --   --    < > = values in this interval not displayed.  Recent Labs   Lab Test  10/15/18   1230  10/15/18   0803  10/14/18   2139  10/14/18   1801  10/14/18   1255   10/12/18   0802   10/08/18   0812   10/07/18   1325   10/06/18   1012   10/05/18   0912   GLC   --    --    --    --    --    --   112*   --   171*   --   232*   --   170*   --   161*   BGM  123*  157*  138*  153*  112*   < >   --    < >   --    < >   --    < >   --    < >   --     < > = values in this interval not displayed.         No results found for this or any previous visit (from the past 24 hour(s)).    Medications   All medications were reviewed.    sodium chloride Stopped (10/15/18 0813)     Warfarin Therapy Reminder         atorvastatin  20 mg Oral Daily     cholecalciferol  1,000 Units Oral Daily     citalopram (celeXA) tablet 10 mg  10 mg Oral Daily     clopidogrel  75 mg Oral Daily     ferrous  sulfate  325 mg Oral Daily     furosemide  40 mg Oral Daily     insulin aspart  10 Units Subcutaneous TID w/meals     insulin aspart  1-7 Units Subcutaneous TID AC     insulin aspart  1-5 Units Subcutaneous At Bedtime     insulin glargine  16 Units Subcutaneous At Bedtime     melatonin tablet 5 mg  5 mg Oral At Bedtime     metFORMIN  500 mg Oral BID w/meals     metoprolol succinate  100 mg Oral Daily     miconazole   Topical TID     pantoprazole  40 mg Oral BID AC     polyethylene glycol  17 g Oral BID     sennosides  2 tablet Oral BID     sodium chloride (PF)  3 mL Intracatheter Q8H     warfarin  7.5 mg Oral ONCE at 18:00

## 2018-10-15 NOTE — PROGRESS NOTES
TYSON Note:    D/I:  SW is following for discharge needs.  SW received call back from:    [facility]: They have only a private room available for $40/day.  Their beds are full for tomorrow, but there is a high chance that one of the patient's won't come there and then they would be able to accept patient.      [facility]: They received referral and are reviewing. They will only have semi-privates available if patient is okay with that.  Patient said [facility] would be his first choice, but would like to be put on the list for a private.      Patient does not want to go to [facility] if possible.  He would rather go to [facility] and [facility] if they other two facilities are not available.     P:  SW will continue to follow and assist as needed.    Denny López, CORKY, LGSW

## 2018-10-15 NOTE — PROGRESS NOTES
VASCULAR SURGERY PROGRESS NOTE    S:  Pain well-controlled on PCA.  Working with PT.  Having BMs.    O:  /61 (BP Location: Right arm)  Pulse 69  Temp 97.8  F (36.6  C) (Oral)  Resp 16  Ht 1.829 m (6')  Wt 80.2 kg (176 lb 12.9 oz)  SpO2 96%  BMI 23.98 kg/m2  I/O: drain 30 yesterday.  General: A&O, NAD.  Extremities:  Dressings c/d/i.  Drain with small amount of serosang drainage.      Blood glucose 112-153 over the last day.    A:  POD#4 s/p revision and closure of BKA.  Recovering well.    R:  1.  PCA discontinued.  2.  Drain removed.  3.  Planning to take down dressings tomorrow.    Anival Mcgarry MD  General surgery resident  [phone]

## 2018-10-15 NOTE — PLAN OF CARE
Problem: Patient Care Overview  Goal: Plan of Care/Patient Progress Review  Outcome: Improving  A+O AVSS on room air. LS clear. BS active, flatus+. Voiding. BKA dressing CDI, rooke boot in place. Turned q2h. Pain controlled w/ PCA. Tele SR w/ Bundle branch block.

## 2018-10-15 NOTE — PLAN OF CARE
Problem: Patient Care Overview  Goal: Plan of Care/Patient Progress Review  Oriented. Up in chair via lift. Dilaudid pca for pain with relief. Appetite good. Ace wrap dressing c/d/i. Foot in boot. Voiding.

## 2018-10-15 NOTE — PROGRESS NOTES
10/15/18 0817   Quick Adds   Type of Visit Initial Occupational Therapy Evaluation   Living Environment   Lives With significant other  (lives with roommate Patrice )   Living Arrangements apartment   Home Accessibility no concerns  (elevator access)   Transportation Available car;family or friend will provide   Living Environment Comment Pt lives with significant other, pt has a step in shower and uses a shower chair.  Pt has transport WC but his brother is using currently.    Self-Care   Dominant Hand right   Usual Activity Tolerance good   Equipment Currently Used at Home walker, standard   Functional Level Prior   Ambulation 1-->assistive equipment   Transferring 1-->assistive equipment   Toileting 1-->assistive equipment   Bathing 1-->assistive equipment  (shower chair)   Dressing 0-->independent   Eating 0-->independent   Communication 0-->understands/communicates without difficulty   Swallowing 0-->swallows foods/liquids without difficulty   Cognition 0 - no cognition issues reported   Fall history within last six months yes   Number of times patient has fallen within last six months 4   Which of the above functional risks had a recent onset or change? ambulation;transferring   Prior Functional Level Comment reports lives with significant other who works during the day, [facility], was amb with 4WW since d/c from [facility] few years ago   General Information   Onset of Illness/Injury or Date of Surgery - Date 09/30/18   Referring Physician Anival Mcgarry MD   Patient/Family Goals Statement To return to [facility]   Additional Occupational Profile Info/Pertinent History of Current Problem Pt is a 76 yo male with R foot osteomyelitis with sepsis, related to diabetic foot wound and arterial insufficiency - s/p open distal R lower leg amputation with wound VAC 10/8 and 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   Precautions/Limitations fall precautions   Weight-Bearing Status - RLE nonweight-bearing   Cognitive Status  Examination   Orientation orientation to person, place and time   Level of Consciousness alert   Able to Follow Commands WNL/WFL   Visual Perception   Visual Perception Wears glasses   Sensory Examination   Sensory Comments pt has baseline PN in hands, no new complaints   Pain Assessment   Patient Currently in Pain No   Range of Motion (ROM)   ROM Quick Adds No deficits were identified   Strength   Strength Comments LUE 5/5, RUE 4/5   Hand Strength   Hand Strength Comments R  stonger than L both WNL   Coordination   Coordination Comments Pt reports some difficutly with buttoning or fastening due to reduced fine motor co/ PN   Transfer Skills   Transfer Comments A of 2   Transfer Skill: Bed to Chair/Chair to Bed   Level of [place]: Bed to Chair maximum assist (25% patients effort)   Physical Assist/Nonphysical Assist: Bed to Chair 2 persons   Weight-Bearing Restrictions nonweight-bearing   Assistive Device - Transfer Skill Bed to Chair Chair to Bed Rehab Eval other (see comments)   Transfer Skill: Sit to Stand   Level of [place]: Sit/Stand moderate assist (50% patients effort)   Physical Assist/Nonphysical Assist: Sit/Stand 2 persons   Transfer Skill: Sit to Stand nonweight-bearing   Assistive Device for Transfer: Sit/Stand rolling walker   Transfer Skill: Toilet Transfer   Level of [place]: Toilet dependent (less than 25% patients effort)   Toilet Transfer Skill Comments pt is currently using urinal and bedpan, does not wish to trial at time of eval   Balance   Balance Comments impaired standing/dynamic   Upper Body Dressing   Level of [place]: Dress Upper Body stand-by assist   Lower Body Dressing   Level of [place]: Dress Lower Body moderate assist (50% patients effort)   Toileting   Level of [place]: Toilet maximum assist (25% patients effort)   Grooming   Level of [place]: Grooming stand-by assist   Eating/Self Feeding   Level of [place]: Eating independent  "  Instrumental Activities of Daily Living (IADL)   Previous Responsibilities meal prep;housekeeping;medication management;finances;driving   Activities of Daily Living Analysis   Impairments Contributing to Impaired Activities of Daily Living balance impaired;strength decreased;post surgical precautions   General Therapy Interventions   Planned Therapy Interventions ADL retraining;bed mobility training;transfer training;progressive activity/exercise   Clinical Impression   Criteria for Skilled Therapeutic Interventions Met yes, treatment indicated   OT Diagnosis reduced IND in ADLs   Influenced by the following impairments balance impaired;strength decreased;post surgical precautions   Assessment of Occupational Performance 3-5 Performance Deficits   Identified Performance Deficits bed mobility, transfers, toileting, bathing, dressing   Clinical Decision Making (Complexity) Moderate complexity   Therapy Frequency daily   Predicted Duration of Therapy Intervention (days/wks) daily   Anticipated Discharge Disposition Acute Rehabilitation Facility   Risks and Benefits of Treatment have been explained. Yes   Patient, Family & other staff in agreement with plan of care Yes   [facility] TM \"6 Clicks\"   2016, Trustees of [facility], under license to [employer].  All rights reserved.   6 Clicks Short Forms Basic Mobility Inpatient Short Form;Daily Activity Inpatient Short Form   [facility]  \"6 Clicks\" Daily Activity Inpatient Short Form   1. Putting on and taking off regular lower body clothing? 2 - A Lot   2. Bathing (including washing, rinsing, drying)? 2 - A Lot   3. Toileting, which includes using toilet, bedpan or urinal? 1 - Total   4. Putting on and taking off regular upper body clothing? 3 - A Little   5. Taking care of personal grooming such as brushing teeth? 3 - A Little   6. Eating meals? 4 - None   Daily Activity Raw Score (Score out of 24.Lower scores equate to lower levels " of function) 15   Total Evaluation Time   Total Evaluation Time (Minutes) 10

## 2018-10-15 NOTE — PLAN OF CARE
"Problem: Patient Care Overview  Goal: Plan of Care/Patient Progress Review  Discharge Planner OT   Patient plan for discharge: ARU  Current status: Orders received, eval completed, treatment initiated. Pt is a 78 yo male s/p R BKA. Pt completed supine to sit with SB-MIN A, reports mild lightheadedness with sitting and standing. Pt completed sit to stands to 2WW with MOD A of 2, pt able to stand with MIN A for apx 30-60 seconds, 3 sit to stands completed. Pt req MIN A for sit to supine. MAX A of 2 to shift up in bed.   Barriers to return to prior living situation: Level of assist  Recommendations for discharge: ARU  Rationale for recommendations: Pt is below baseline for ADLs and functional mobility, pt would benefit from intensive therapy to maximize safety and IND, pt is motivated and reports supportive family. Pt has been to ARU previously and reports he had a \"great experience with motivated people to help.\"       Entered by: Pinky Herr 10/15/2018 8:47 AM           "

## 2018-10-15 NOTE — DISCHARGE SUMMARY
[facility]  Discharge Summary        Tad Manning MRN# [id number]   YOB: 1940 Age: 77 year old     Date of Admission: 9/30/2018  Date of Discharge: 10/16/2018  Admitting Physician: Trip Ruby DO  Discharge Physician: Abdiel Alejandro MD     Primary Provider: Gene Guevara  Primary Care Physician Phone Number: [phone]         Discharge Diagnoses:   1. 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  2. Acute blood loss anemia on chronic anemia.  3. Ischemic cardiomyopathy with acute on chronic biventricular systolic CHF exacerbation.  4. Acute kidney injury, stage stage 2 on CKD stage 3.  5. Physical deconditioning with h/o mechanical falls due to acute illness/chronic debilitation.        Other Chronic Medical Problems:      1. Hypertension (benign essential).  2. DM2 with neuropathy and circulatory complications.  3. PAD. H/o lower extremity stents. H/o left lower extremity bypass 2014  4. Persistent atrial fibrillation.  5. 2nd degree AV Block.   6. BPH.  7. GERD.  8. Anxiety.       Allergies:         Allergies   Allergen Reactions     Blood Transfusion Related (Informational Only) Other (See Comments)     Patient has a history of a clinically significant antibody against RBC antigens.  A delay in compatible RBCs may occur.            Discharge Medications:        Current Discharge Medication List      START taking these medications    Details   bisacodyl (DULCOLAX) 10 MG Suppository Place 1 suppository (10 mg) rectally daily as needed for constipation  Qty: 30 suppository    Associated Diagnoses: Slow transit constipation      ferrous sulfate (IRON) 325 (65 Fe) MG tablet Take 1 tablet (325 mg) by mouth daily  Qty: 100 tablet    Associated Diagnoses: Anemia, unspecified type      !! insulin aspart (NOVOLOG FLEXPEN) 100  UNIT/ML injection Novolog Flexpen  Give before meals and before bed:  For Pre-Meal Glucose:  140-189 give 1 unit   190-239 give 2 units   240-289 give 3 units   290-339 give 4 units   = or >340 give 5 units     For Bedtime Glucose  200 - 239 give 1 units   240 - 289 give 1.5 units  290 - 339 give 2 units  = or >340 give 2.5 units  Qty: 15 mL, Refills: 0    Associated Diagnoses: Type 2 diabetes mellitus with diabetic polyneuropathy, without long-term current use of insulin (H)      !! insulin aspart (NOVOLOG PEN) 100 UNIT/ML injection Inject 10 Units Subcutaneous 3 times daily (with meals)    Associated Diagnoses: Type 2 diabetes mellitus with diabetic polyneuropathy, without long-term current use of insulin (H)      insulin glargine (LANTUS SOLOSTAR) 100 UNIT/ML pen Inject 16 Units Subcutaneous At Bedtime    Associated Diagnoses: Type 2 diabetes mellitus with diabetic polyneuropathy, without long-term current use of insulin (H)      miconazole (MICATIN; MICRO GUARD) 2 % powder Apply topically 3 times daily    Associated Diagnoses: Skin disease, fungal      oxyCODONE IR (ROXICODONE) 5 MG tablet Take 1-2 tablets (5-10 mg) by mouth every 4 hours as needed for pain  Qty: 20 tablet, Refills: 0    Associated Diagnoses: Osteomyelitis of foot, right, acute (H)      pantoprazole (PROTONIX) 40 MG EC tablet Take 1 tablet (40 mg) by mouth daily  Qty: 30 tablet    Associated Diagnoses: Gastroesophageal reflux disease, esophagitis presence not specified      polyethylene glycol (MIRALAX/GLYCOLAX) Packet Take 17 g by mouth 2 times daily  Qty: 7 packet    Comments: Hold if loose stools/diarrhea.  Associated Diagnoses: Slow transit constipation      PROVIDER DOSED MANAGED WARFARIN, COUMADIN, OUTPATIENT Dose based on INR per Primary MD/Nursing Home MD.    Comments: INR goal 2-3 for afib.  Associated Diagnoses: Atrial fibrillation, unspecified type (H)      sennosides (SENOKOT) 8.6 MG tablet Take 2 tablets by mouth 2 times daily  Qty:  120 each    Comments: Hold if loose stools/diarrhea.  Associated Diagnoses: Slow transit constipation       !! - Potential duplicate medications found. Please discuss with provider.      CONTINUE these medications which have NOT CHANGED    Details   acetaminophen (TYLENOL) 325 MG tablet Take 325 mg by mouth every 4 hours as needed for mild pain      atorvastatin (LIPITOR) 20 MG tablet TAKE 1 TABLET BY MOUTH EVERY DAY  Qty: 90 tablet, Refills: 3    Associated Diagnoses: Claudication of left lower extremity (H)      cholecalciferol 1000 UNITS TABS Take 1,000 Units by mouth daily  Qty: 30 tablet    Associated Diagnoses: Loop diuretic causing adverse effect in therapeutic use, subsequent encounter      Citalopram Hydrobromide (CELEXA PO) Take 10 mg by mouth daily      clopidogrel (PLAVIX) 75 MG tablet Take 1 tablet (75 mg) by mouth daily  Qty: 90 tablet, Refills: 3    Associated Diagnoses: Status post percutaneous transluminal coronary angioplasty; Coronary artery disease involving native coronary artery of native heart without angina pectoris      furosemide (LASIX) 20 MG tablet Take 2 tablets (40 mg) by mouth daily  Qty: 180 tablet, Refills: 3    Associated Diagnoses: Chronic systolic heart failure (H)      hydrocortisone (CORTIZONE-10) 1 % ointment Apply topically 2 times daily as needed for itching      MELATONIN PO Take 5 mg by mouth daily      metFORMIN (GLUCOPHAGE) 500 MG tablet Take 1 tablet (500 mg) by mouth 2 times daily (with meals)  Qty: 60 tablet    Associated Diagnoses: Type 2 diabetes mellitus with diabetic polyneuropathy, without long-term current use of insulin (H)      metoprolol succinate (TOPROL-XL) 200 MG 24 hr tablet Take 0.5 tablets (100 mg) by mouth daily  Qty: 90 tablet, Refills: 3    Associated Diagnoses: Chronic systolic heart failure (H)      nitroglycerin (NITROSTAT) 0.4 MG sublingual tablet For chest pain place 1 tablet under the tongue every 5 minutes for 3 doses. If symptoms persist 5  minutes after 1st dose call 911.  Qty: 25 tablet    Associated Diagnoses: Atrial flutter, unspecified type (H)      sodium chloride (EQ SALINE NASAL SPRAY) 0.65 % nasal spray Spray 1 spray into both nostrils daily as needed for congestion  Qty: 1 Bottle, Refills: 1      warfarin (COUMADIN) 5 MG tablet Take 0.5 tablets tonight 9/24 and 1 tab tomorrow night 9/25. INR check on Wednesday 9/26 and continue as directed by coumadin clinic.  Qty: 45 tablet, Refills: 1    Associated Diagnoses: Chronic atrial fibrillation (H); Long-term (current) use of anticoagulants      order for DME Equipment being ordered: DH2 shoe  Qty: 1 Device, Refills: 0    Associated Diagnoses: 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); Diabetic ulcer of left foot due to type 2 diabetes mellitus (H)         STOP taking these medications       nystatin (MYCOSTATIN) cream Comments:   Reason for Stopping:         ranitidine (ZANTAC) 150 MG tablet Comments:   Reason for Stopping:                   Discharge Instructions and Follow-Up:      Follow-up Appointments     Follow Up and recommended labs and tests       Follow up with [facility] physician.  The following labs/tests are   recommended: CBC, BMP next week. INR daily or per routine for warfarin   dosing (goal INR 2-3 for atrial fibrillation).  Follow up with primary care provider after TCU discharge.  Surgical follow up per Vascular Surgery.                          Consultations This Hospital Stay:      1. Podiatry.  2. Vascular Surgery.  3. Cardiology.        Admission History:      Please see the H&P by Trip Ruby DO on 9/30/2018 for complete details. Briefly, Tad Manning is a 77 year old male history including DM2 with neuropathy, HTN, CAD, CHF, PAD, CKD and atrial fibrillation, who presented 9/30 with R foot wound with concerns for osteomyelitis.        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  Patient transferred from [facility] to [facility] 9/30 for further evaluation of non-healing right foot ulcer. Started on vanco and Zosyn on admit. Seen by Vascular Surgery. MRI right foot 10/10 howed plantar lateral skin ulcer at the level of the fifth metatarsal head with devitalized soft tissue thickening distal to the ulcer in the plantar fifth toe; nonspecific mild bone marrow edema in the fifth metatarsal head and proximal phalanx base may be reactive change alone but early osteomyelitis not excluded. US WALLACE 10/1 showed left within normal limits, right suggested moderate arterial insufficiency. Delay in surgery due to cardiac issues (see below). Ultimately underwent R BKA 10/11. Vanco discontinued 10/9. Zosyn changed to Augmentin 10/12.  - Post-op management per Vascular.  - Continue Augmentin (started 10/12) - plan stop after 10/14.     Acute blood loss anemia on chronic anemia.  Baseline hgb around 9-10 range. Iron studies 10/1 suggested ACD +/- iron deficiency. TSH, B12, folate on 10/12 were normal. Received prbc's 10/3, 10/4, 10/8, 10/13.   Recent Labs  Lab 10/15/18  0819 10/14/18  0757 10/13/18  0740 10/12/18  1150 10/11/18  1657 10/11/18  0821   HGB 8.1* 8.2* 7.3* 7.6* 7.8* 8.4*   - Monitor CBC outpatient.     Ischemic cardiomyopathy with acute on chronic biventricular systolic CHF exacerbation.  Hypertension (benign essential).  [PTA BP meds/diuretics: furosemide 40 mg daily, metoprolol  mg daily.]  * S/p 3-vessel CABG in 1995. S/p PCI to mLAD 3/2018. Echo 7/2018 showed LVEF 25%.  * Seen by Cardiology this stay. Echo 10/5 showed LVEF 35-45%; RV systolic function mildly decreased. Diuresed with IV Lasix this stay.  * Wt (admit wt 177 lbs 9/30): 176 lbs 10/14 <-- 175 lbs 10/13 <-- 174 lbs <-- 179 lbs.  - Continue atorvastatin, clopidogrel, furosemide, metoprolol XL, PRN  "NTG.  - Monitor daily wts.     DM2 with vascular complications.  [PTA: metformin 500 mg BID.]  Hgb A1C 9.0 9/22. Started on Lantus this stay.   Recent Labs  Lab 10/16/18  1145 10/16/18  0749 10/16/18  0100 10/15/18  2035 10/15/18  1725  10/12/18  0802   GLC  --   --   --   --   --   --  112*   * 156* 113* 119* 125*  < >  --    < > = values in this interval not displayed.  - Continue Lantus 16U at bedtime.  - Continue aspart 10U TID with meals.  - Continue metformin 500 mg BID.  - Continue ISS.     Acute kidney injury, stage stage 2 on CKD stage 3.  Baseline cr around 1.4 previously this year. Cr 2.23 on admit 9/22. Cr steadily improved this hospitalization.     PAD.  * H/o lower extremity stents. H/o left lower extremity bypass 2014  * WALLACE 10/1 showed moderate arterial insufficiency on the R.  - Continue Plavix.      Persistent atrial fibrillation.  Restarted warfarin 10/14.  - Continue metoprolol XL, warfarin.     2nd degree AV Block.   Noted to have abnormal conduction on telemetry 9/22 at [facility]. EKG at that time showed sinus rhythm with 2nd degree AV block and appeared to have been present on previous EKG back in 7/2018. Seen by Cardiology this stay.  - F/u with Cardiology.     BPH.  Tamsulosin was recently discontinued due to recurrent falls.  - Monitor clinically.      GERD.  Previously on ranitidine. Protonix started this hospitalization.  - Continue pantoprazole.     Anxiety.  Discontinued Alphatrex recently due to high fall risk.  - Continue citalopram.      Physical deconditioning with h/o mechanical falls due to acute illness/chronic debilitation.  * Admitted from 9/21-9/24 at [facility] for acute on chronic kidney injury and mechanical fall/supratherapeutic INR. He reported that his \"knees give out\".   - Continue PT, OT.          Code Status:      Full Code        Pending Results:        Unresulted Labs Ordered in the Past 30 Days of this Admission     Date and Time Order Name Status Description    " 10/11/2018 1335 Surgical pathology exam In process     10/8/2018 1509 Surgical pathology exam In process                 Discharge Disposition:      Discharged to TCU.        Discharge Time:      Greater than 30 minutes.        Key Imaging Studies, Lab Findings and Procedures/Surgeries:        Results for orders placed or performed during the hospital encounter of 09/30/18   MR Foot Right w/o & w Contrast    Narrative    MR FOOT RIGHT WITH AND WITHOUT CONTRAST October 1, 2018 6:38 AM    HISTORY: Open wound lateral plantar right forefoot for six to seven  weeks. Diabetic patient. Evaluate for osteomyelitis.    TECHNIQUE: Multisequence multiplanar MRI without and with IV contrast.  8 mL Gadavist given intravenously.    COMPARISON: None.    FINDINGS: Some sequences are compromised by motion artifact. Area of  presumed current skin ulcer is seen in the lateral plantar region at  the level of the fifth metatarsal head (image 4 of series 5). The  plantar soft tissues of the little toe are swollen but show decreased  contrast enhancement consistent with tissue devitalization. There is  no evidence for an abscess in this region. There is mild bone marrow  edema in the fifth metatarsal head as well as at the base of the fifth  proximal phalanx. This may be reactive edema alone, especially since  the ulcer appears relatively shallow. Also, there is no cortical  effacement. However, very early osteomyelitis is not excluded (image 4  of series 5). No fifth metatarsophalangeal joint effusion.    The remainder of the bony structures are unremarkable. Edema and mild  contrast enhancement of the plantar intrinsic muscles of the foot,  commonly seen in diabetic patients. There is some unusual foci of  decreased signal in the plantar soft tissues at the level of the  second and third metatarsal heads. This appears to be some type of  postoperative change.      Impression    IMPRESSION:  1. Plantar lateral skin ulcer at the level of  the fifth metatarsal  head. Devitalized soft tissue thickening distal to the ulcer in the  plantar fifth toe.  2. Nonspecific mild bone marrow edema in the fifth metatarsal head and  proximal phalanx base may be reactive change alone but early  osteomyelitis is not excluded.  3. Probable postoperative changes in the soft tissues of the distal  forefoot more medially.    RACHAEL SPARKS MD   US WALLACE Doppler No Exercise    Narrative    US WALLACE DOPPLER NO EXERCISE, 1-2 LEVELS,??? BILAT  10/1/2018 5:13 PM     HISTORY: Assess blood flow, osteomyelitis of the right foot.    COMPARISON: None.    FINDINGS:   The resting right and left ankle-brachial indices are 0.60 and 1.05  respectively.    Waveform analysis indicates monophasic waveforms in the distal right  tibial arteries and biphasic to triphasic waveforms in the distal left  tibial arteries.      Impression    IMPRESSION: WALLACE on the left is within normal limits. WALLACE on the right  would indicate moderate arterial insufficiency. Given osteomyelitis of  the right foot, further evaluation and possible intervention with  angiography could be performed.      WALLACE Diagnostic Criteria      >/= 1.3          Non compressible  0.95-1.0          Normal  0.90-0.94        Mild PAD  0.50-0.89        Moderate PAD  0.20-0.49        Severe PAD  <0.20               Critical    DYLAN MILLER MD   XR Chest Port 1 View    Narrative    CHEST ONE VIEW PORTABLE       PROCEDURE DATE:  10/4/2018 6:26 AM     HISTORY:   Evaluate CHF;     COMPARISON:  5/25/2017    FINDINGS/    Impression    IMPRESSION:   The right costophrenic angle and right lung bases is incompletely  imaged and therefore not evaluated. Postsurgical changes from prior  CABG included sternotomy wires and mediastinal clips. Posterior spinal  fusion hardware is present. Cardiomediastinal silhouette is not  enlarged. Increased interstitial markings and perihilar opacities  favoring pulmonary edema.    RUTH ANN JRUADO MD   XR Chest  1 View    Narrative    CHEST ONE VIEW UPRIGHT 10/4/2018 8:35 AM     HISTORY: Pre-op.     COMPARISON: October 4, 2018      Impression    IMPRESSION: No acute infiltrates. There is a sternotomy and vascular  clips consistent with CABG.    ROSANA HERNANDEZ MD     Limited echo 10/5/2018:    Interpretation Summary     The visual ejection fraction is estimated at 35-40%.  There is mild-moderate global hypokinesia of the left ventricle.  Mildly decreased right ventricular systolic function     In comparison with the previous study the LVEF has improved slightly.    Surgical procedure 10/8/2018:  Open distal right lower leg amputation with placement of wound VAC.    Surgical procedure 10/12/2018:  Formalization (closure) of right below-knee amputation.

## 2018-10-15 NOTE — PLAN OF CARE
Problem: Patient Care Overview  Goal: Individualization & Mutuality  Outcome: Improving  Pt a/o x 4. VSS. Pain well controlled with oxycodone.  PCA pump dc'd this am. Dressing CDI. MICHELLE drain dc'd this am. BS checks, scheduled insulin. Uses urinal at bedside. Tolerating mod CHO diet, up with A2 pivot or lift into chair. Coccyx red meplex applied.

## 2018-10-15 NOTE — PLAN OF CARE
Problem: Patient Care Overview  Goal: Plan of Care/Patient Progress Review  Discharge Planner PT   Patient plan for discharge: Rehab  Current status: Sup>sit SBA, slow but no physical assist required. Squat pivot bed>wc Min A x 1. Able to propel wc around unit x 12 m in with use of UEs and LLE, occasional Min A to turn. Provided handout for BKA exercises, up in wc upon PT exit.  Barriers to return to prior living situation: Level of assist, fatigue, impaired balance and activity tolerance.  Recommendations for discharge: Acute Rehab  Rationale for recommendations: Pt will benefit from continued skilled rehab services to progress independence and safety with functional mobility prior to discharge home.        Entered by: Nancy Pearson 10/15/2018 4:46 PM

## 2018-10-15 NOTE — PLAN OF CARE
Problem: Patient Care Overview  Goal: Plan of Care/Patient Progress Review  Outcome: Improving  Patient A&O. VSS on RA. Pain controlled with PCA. Dressing CDI. Tolerating mod carb diet. 1 BM this shift, stool softeners held. Tele: SR with bundle branch block.

## 2018-10-16 ENCOUNTER — APPOINTMENT (OUTPATIENT)
Dept: OCCUPATIONAL THERAPY | Facility: CLINIC | Age: 78
DRG: 854 | End: 2018-10-16
Attending: SURGERY
Payer: MEDICARE

## 2018-10-16 ENCOUNTER — APPOINTMENT (OUTPATIENT)
Dept: PHYSICAL THERAPY | Facility: CLINIC | Age: 78
DRG: 854 | End: 2018-10-16
Attending: INTERNAL MEDICINE
Payer: MEDICARE

## 2018-10-16 ENCOUNTER — TELEPHONE (OUTPATIENT)
Dept: CARDIOLOGY | Facility: CLINIC | Age: 78
End: 2018-10-16

## 2018-10-16 VITALS
WEIGHT: 174.82 LBS | SYSTOLIC BLOOD PRESSURE: 136 MMHG | BODY MASS INDEX: 23.68 KG/M2 | DIASTOLIC BLOOD PRESSURE: 64 MMHG | HEIGHT: 72 IN | HEART RATE: 65 BPM | OXYGEN SATURATION: 98 % | RESPIRATION RATE: 16 BRPM | TEMPERATURE: 97.9 F

## 2018-10-16 VITALS
HEART RATE: 76 BPM | OXYGEN SATURATION: 98 % | BODY MASS INDEX: 23.95 KG/M2 | RESPIRATION RATE: 16 BRPM | SYSTOLIC BLOOD PRESSURE: 132 MMHG | DIASTOLIC BLOOD PRESSURE: 63 MMHG | TEMPERATURE: 97.1 F | WEIGHT: 176.6 LBS

## 2018-10-16 LAB
GLUCOSE BLDC GLUCOMTR-MCNC: 108 MG/DL (ref 70–99)
GLUCOSE BLDC GLUCOMTR-MCNC: 113 MG/DL (ref 70–99)
GLUCOSE BLDC GLUCOMTR-MCNC: 156 MG/DL (ref 70–99)
INR PPP: 1.14 (ref 0.86–1.14)

## 2018-10-16 PROCEDURE — A9270 NON-COVERED ITEM OR SERVICE: HCPCS | Mod: GY | Performed by: INTERNAL MEDICINE

## 2018-10-16 PROCEDURE — 36415 COLL VENOUS BLD VENIPUNCTURE: CPT | Performed by: INTERNAL MEDICINE

## 2018-10-16 PROCEDURE — 99239 HOSP IP/OBS DSCHRG MGMT >30: CPT | Performed by: INTERNAL MEDICINE

## 2018-10-16 PROCEDURE — 25000132 ZZH RX MED GY IP 250 OP 250 PS 637: Mod: GY | Performed by: INTERNAL MEDICINE

## 2018-10-16 PROCEDURE — 97110 THERAPEUTIC EXERCISES: CPT | Mod: GP | Performed by: PHYSICAL THERAPIST

## 2018-10-16 PROCEDURE — A9270 NON-COVERED ITEM OR SERVICE: HCPCS | Mod: GY | Performed by: SURGERY

## 2018-10-16 PROCEDURE — 40000133 ZZH STATISTIC OT WARD VISIT

## 2018-10-16 PROCEDURE — 25000132 ZZH RX MED GY IP 250 OP 250 PS 637: Mod: GY | Performed by: SURGERY

## 2018-10-16 PROCEDURE — 00000146 ZZHCL STATISTIC GLUCOSE BY METER IP

## 2018-10-16 PROCEDURE — 40000193 ZZH STATISTIC PT WARD VISIT: Performed by: PHYSICAL THERAPIST

## 2018-10-16 PROCEDURE — 85610 PROTHROMBIN TIME: CPT | Performed by: INTERNAL MEDICINE

## 2018-10-16 PROCEDURE — 97530 THERAPEUTIC ACTIVITIES: CPT | Mod: GP | Performed by: PHYSICAL THERAPIST

## 2018-10-16 PROCEDURE — 97535 SELF CARE MNGMENT TRAINING: CPT | Mod: GO

## 2018-10-16 RX ORDER — WARFARIN SODIUM 5 MG/1
10 TABLET ORAL
Status: DISCONTINUED | OUTPATIENT
Start: 2018-10-16 | End: 2018-10-16 | Stop reason: HOSPADM

## 2018-10-16 RX ADMIN — FERROUS SULFATE TAB 325 MG (65 MG ELEMENTAL FE) 325 MG: 325 (65 FE) TAB at 08:43

## 2018-10-16 RX ADMIN — CLOPIDOGREL 75 MG: 75 TABLET, FILM COATED ORAL at 08:43

## 2018-10-16 RX ADMIN — PANTOPRAZOLE SODIUM 40 MG: 40 TABLET, DELAYED RELEASE ORAL at 07:04

## 2018-10-16 RX ADMIN — CITALOPRAM HYDROBROMIDE 10 MG: 10 TABLET ORAL at 08:43

## 2018-10-16 RX ADMIN — ATORVASTATIN CALCIUM 20 MG: 20 TABLET, FILM COATED ORAL at 08:43

## 2018-10-16 RX ADMIN — FUROSEMIDE 40 MG: 40 TABLET ORAL at 08:43

## 2018-10-16 RX ADMIN — INSULIN ASPART 1 UNITS: 100 INJECTION, SOLUTION INTRAVENOUS; SUBCUTANEOUS at 09:30

## 2018-10-16 RX ADMIN — VITAMIN D, TAB 1000IU (100/BT) 1000 UNITS: 25 TAB at 08:43

## 2018-10-16 RX ADMIN — OXYCODONE HYDROCHLORIDE 5 MG: 5 TABLET ORAL at 11:03

## 2018-10-16 RX ADMIN — OXYCODONE HYDROCHLORIDE 5 MG: 5 TABLET ORAL at 11:36

## 2018-10-16 RX ADMIN — METFORMIN HYDROCHLORIDE 500 MG: 500 TABLET ORAL at 08:43

## 2018-10-16 RX ADMIN — MICONAZOLE NITRATE: 2 POWDER TOPICAL at 08:47

## 2018-10-16 RX ADMIN — OXYCODONE HYDROCHLORIDE 10 MG: 5 TABLET ORAL at 14:14

## 2018-10-16 ASSESSMENT — ACTIVITIES OF DAILY LIVING (ADL)
ADLS_ACUITY_SCORE: 17

## 2018-10-16 NOTE — PROGRESS NOTES
[facility]    Internal Medicine Hospitalist Progress Note  10/16/2018  I evaluated patient on the above date.    Abdiel Alejandro Jr., MD  [phone] (p)  Text Page      Assessment & Plan   D/C home: see discharge summary for diagnoses.    # Pain Assessment:  Current Pain Score 10/16/2018   Patient currently in pain? yes   Pain score (0-10) 6   Pain location Leg   Pain descriptors Aching   Pt having pain from recent surgery.      Interval History   Doing OK overall. Denies chest pain or SOB.    -Data reviewed today: I reviewed all new labs and imaging over the last 24 hours. I personally reviewed no images or EKG's today.    Physical Exam   Heart Rate: 66, Blood pressure 136/64, pulse 65, temperature 97.9  F (36.6  C), temperature source Oral, resp. rate 16, height 1.829 m (6'), weight 79.3 kg (174 lb 13.2 oz), SpO2 98 %.  Vitals:    10/14/18 0600 10/15/18 0621 10/16/18 0515   Weight: 80.2 kg (176 lb 12.9 oz) 80.2 kg (176 lb 12.9 oz) 79.3 kg (174 lb 13.2 oz)     Vital Signs with Ranges  Temp:  [97.7  F (36.5  C)-98.2  F (36.8  C)] 97.9  F (36.6  C)  Pulse:  [65-84] 65  Heart Rate:  [66] 66  Resp:  [16-18] 16  BP: (114-137)/(58-69) 136/64  SpO2:  [98 %-99 %] 98 %  Patient Vitals for the past 24 hrs:   BP Temp Temp src Pulse Heart Rate Resp SpO2 Weight   10/16/18 1200 - - - - - 16 - -   10/16/18 1136 - - - - - 16 - -   10/16/18 1103 - - - - - 16 - -   10/16/18 1100 136/64 97.9  F (36.6  C) Oral 65 - 18 98 % -   10/16/18 0751 137/69 97.7  F (36.5  C) Oral 69 - 18 98 % -   10/16/18 0515 - - - - - - - 79.3 kg (174 lb 13.2 oz)   10/16/18 0034 135/67 97.8  F (36.6  C) Oral 67 66 18 99 % -   10/15/18 2018 114/63 98.2  F (36.8  C) Oral 68 - 18 99 % -   10/15/18 1615 118/58 97.7  F (36.5  C) Oral 84 - 18 98 % -     I/O's Last 24 hours  I/O last 3 completed shifts:  In: 1073 [P.O.:1070; I.V.:3]  Out: 1300 [Urine:1300]    Constitutional: Alert, oriented, pleasant.  Respiratory: Diminished in bases. No crackles or  wheezes.  Cardiovascular: RRR no m/r/g.  GI:   Skin/Integumen:   Other:        Data     Recent Labs  Lab 10/16/18  0724 10/15/18  0819 10/14/18  0757 10/13/18  0740  10/12/18  0802 10/11/18  1657 10/11/18  0821   WBC  --   --  6.5  --   --   --  9.1  --    HGB  --  8.1* 8.2* 7.3*  < >  --  7.8* 8.4*   MCV  --   --  89  --   --   --  90  --    PLT  --   --  130*  --   --   --  152 137*   INR 1.14 1.11 1.19*  --   --   --   --   --    NA  --   --   --   --   --  137  --   --    POTASSIUM  --   --   --   --   --  4.1  --  4.1   CHLORIDE  --   --   --   --   --  103  --   --    CO2  --   --   --   --   --  28  --   --    BUN  --   --   --   --   --  19  --   --    CR  --   --   --   --   --  1.17  --   --    ANIONGAP  --   --   --   --   --  6  --   --    JUSTINO  --   --   --   --   --  8.4*  --   --    GLC  --   --   --   --   --  112*  --   --    < > = values in this interval not displayed.  Recent Labs   Lab Test  10/16/18   1145  10/16/18   0749  10/16/18   0100  10/15/18   2035  10/15/18   1725   10/12/18   0802   10/08/18   0812   10/07/18   1325   10/06/18   1012   10/05/18   0912   GLC   --    --    --    --    --    --   112*   --   171*   --   232*   --   170*   --   161*   BGM  108*  156*  113*  119*  125*   < >   --    < >   --    < >   --    < >   --    < >   --     < > = values in this interval not displayed.         No results found for this or any previous visit (from the past 24 hour(s)).    Medications   All medications were reviewed.    sodium chloride Stopped (10/15/18 0813)     Warfarin Therapy Reminder         atorvastatin  20 mg Oral Daily     cholecalciferol  1,000 Units Oral Daily     citalopram (celeXA) tablet 10 mg  10 mg Oral Daily     clopidogrel  75 mg Oral Daily     ferrous sulfate  325 mg Oral Daily     furosemide  40 mg Oral Daily     insulin aspart  10 Units Subcutaneous TID w/meals     insulin aspart  1-7 Units Subcutaneous TID AC     insulin aspart  1-5 Units Subcutaneous At Bedtime      insulin glargine  16 Units Subcutaneous At Bedtime     melatonin tablet 5 mg  5 mg Oral At Bedtime     metFORMIN  500 mg Oral BID w/meals     metoprolol succinate  100 mg Oral Daily     miconazole   Topical TID     pantoprazole  40 mg Oral BID AC     polyethylene glycol  17 g Oral BID     sennosides  2 tablet Oral BID     sodium chloride (PF)  3 mL Intracatheter Q8H     warfarin  10 mg Oral ONCE at 18:00

## 2018-10-16 NOTE — PROGRESS NOTES
[facility]    Vascular Surgery Progress Note    Assessment & Plan   Procedure(s):  AMPUTATE REVISION STUMP LOWER EXTREMITY   -5 Days Post-Op     76 y/o male s/p RLE BKA for non-healing ulcer on right foot. Recovering well  --Will change his RLE dressing today  --Patient pending discharge to acute rehab   --Patient resumed on his home A/C    Principal Problem:    Osteomyelitis Right Foot -- S/P BKA 10/11/18  Active Problems:    DM type 2 w neuropathy -- Hgb A1C 9.0 on 9/22/18    HTN (hypertension)    CAD -- S/P CABG    Osteomyelitis left 2nd Toe s/p amputaion 1/10/14    CRF (chronic renal failure), stage 3 (moderate) (H)    Chronic systolic heart failure (H)    Atrial fibrillation -- on Warfarin      Min Lela Silva MD    Interval History   No acute events overnight. Patient resting comfortably this morning, sleeping     Physical Exam   Temp: 97.8  F (36.6  C) Temp src: Oral BP: 135/67 Pulse: 67 Heart Rate: 66 Resp: 18 SpO2: 99 % O2 Device: None (Room air)    Vitals:    10/14/18 0600 10/15/18 0621 10/16/18 0515   Weight: 80.2 kg (176 lb 12.9 oz) 80.2 kg (176 lb 12.9 oz) 79.3 kg (174 lb 13.2 oz)     Vital Signs with Ranges  Temp:  [97.7  F (36.5  C)-98.7  F (37.1  C)] 97.8  F (36.6  C)  Pulse:  [67-84] 67  Heart Rate:  [66] 66  Resp:  [16-18] 18  BP: (114-139)/(58-72) 135/67  SpO2:  [94 %-99 %] 99 %  I/O last 3 completed shifts:  In: 1073 [P.O.:1070; I.V.:3]  Out: 1300 [Urine:1300]    PE:   NAD, awake and alert  Chest: S1 S2 present, RRR, no wheezing   Abd: soft, NT, ND, no rebound or guarding  Ex: RLE BKA stump wrapped in ACE bandage, in Rooke Boot     Medications     sodium chloride Stopped (10/15/18 0813)     Warfarin Therapy Reminder          atorvastatin  20 mg Oral Daily     cholecalciferol  1,000 Units Oral Daily     citalopram (celeXA) tablet 10 mg  10 mg Oral Daily     clopidogrel  75 mg Oral Daily     ferrous sulfate  325 mg Oral Daily     furosemide  40 mg Oral Daily     insulin aspart  10 Units  Subcutaneous TID w/meals     insulin aspart  1-7 Units Subcutaneous TID AC     insulin aspart  1-5 Units Subcutaneous At Bedtime     insulin glargine  16 Units Subcutaneous At Bedtime     melatonin tablet 5 mg  5 mg Oral At Bedtime     metFORMIN  500 mg Oral BID w/meals     metoprolol succinate  100 mg Oral Daily     miconazole   Topical TID     pantoprazole  40 mg Oral BID AC     polyethylene glycol  17 g Oral BID     sennosides  2 tablet Oral BID     sodium chloride (PF)  3 mL Intracatheter Q8H       Data   CBC RESULTS:   Recent Labs   Lab Test  10/15/18   0819  10/14/18   0757   WBC   --   6.5   RBC   --   2.73*   HGB  8.1*  8.2*   HCT   --   24.2*   MCV   --   89   MCH   --   30.0   MCHC   --   33.9   RDW   --   15.9*   PLT   --   130*     I agree with the above.  The dressing for the right BKA was taken down earlier today and the stump looks fine.  Ready for discharge to acute rehab unit from the vascular surgical standpoint.  Vascular surgical follow-up will be with me in 4-5 weeks for suture removal.  Utilize Rooke stump protector whenever possible.    Solis Vick MD

## 2018-10-16 NOTE — PROGRESS NOTES
Updated patient and sister - in - law, Joyce, regarding being accepted at [facility] for today. They prefer [facility] ride. I explained the private cost of the ride with $70 base fee and approximately $5/mile

## 2018-10-16 NOTE — TELEPHONE ENCOUNTER
Returning call to Tad. He recently was admitted to [facility] for a foot amputation which ended up becoming a below the knee amputation for sepsis. He wanted Dr. Valdes to know that his October 22 appointment would be cancelled as he will need time to recover. I will call him in December and we will decide when he will be able to visit with her.

## 2018-10-16 NOTE — PLAN OF CARE
Problem: Patient Care Overview  Goal: Plan of Care/Patient Progress Review  Outcome: Improving  Pt is pod3 from Right BKA revision. VSS. Tele SR 1st degree AVB + BBB. A/Ox4. Right Leg stump protector in place, dressings underneath are cdi, surgery will undress tomorrow. Coccyx is red and blanchable, new silicone dressing applied. Pain controlled with prn Motrin and Oxy. Up with A2+GB, last turned at 2200. Tolerating Mod Carb diet. Blood sugars 125/119. +gas, Incontinent of bowel x 2;soft. Voiding to urinal. LS clear. IS to 1500

## 2018-10-16 NOTE — PROVIDER NOTIFICATION
Dr Silva and Resident did dressing change at bedside, clarified with MDs that patient is ok form a vascular surgery standpoint to discharge if ok with hospitalist.

## 2018-10-16 NOTE — PLAN OF CARE
"Problem: Patient Care Overview  Goal: Plan of Care/Patient Progress Review  Discharge Planner PT   Patient plan for discharge: rehab  Current status: Completed BKA exercises, able to tolerate partial side/prone lying for hip flexor stretch, reports comfort in the position \"feels good to stretch.\" Encouraged pt to lie on one side or the other for pressure relief and independence with stretching. Pt is dependent for don/doff stump protector for exercise. Sup>sit Min A with increased time, sit>stand/pivot bed>chair Min A x 1. Up in chair for breakfast.  Barriers to return to prior living situation: Lives alone, (significant other is in/out), impaired strength, balance and activity tolerance. Fall risk.  Recommendations for discharge: Acute Rehab  Rationale for recommendations: If pt unable to go to Acute Rehab, secondary to home set up not optimal for wc access as pt will likely need, pt will benefit from a TCU stay to address mobility deficits, improve safety and independence prior to return home.        Entered by: Nancy Pearson 10/16/2018 11:11 AM           "

## 2018-10-16 NOTE — PROVIDER NOTIFICATION
"Page out to Dr Silva requesting \"Please add surgical discharge orders including, wound care and follow up.\"  Awaiting new orders.  "

## 2018-10-16 NOTE — PROGRESS NOTES
SW:    I: SW following for discharge planning. Pt accepted at [facility], which is pt first preference. SW left message with [facility] and will update once known if pt medically appropriate for discharge today. SW to continue coordinating discharge as able.    P: SW to follow.    .PAS-RR    D: Per DHS regulation, SW completed and submitted PAS-RR to [place] [employer]ging Direct Connect via the Senior LinkAge Line.  PAS-RR confirmation # is : [id number]    I: TYSON spoke with pt and they are aware a PAS-RR has been submitted.  TYSON reviewed with pt that they may be contacted for a follow up appointment within 10 days of hospital discharge if their SNF stay is < 30 days.  Contact information for [facility] LinkAge Line was also provided.    A: Pt verbalized understanding.    P: Further questions may be directed to [facility] LinkAge Line at #[phone], option #4 for PAS-RR staff.    HE ride arranged for 1500 per pt and family request. TYSON updated [facility], will fax orders when able to admissions [phone]    1400: Discharge orders faxed to facility admissions.     CORKY Wayne, Northern [facility] Inland HospitalSW  Daytime (8:00am-4:30pm): [phone]  After-Hours SW Pager (4:30pm-11:30pm): [phone]

## 2018-10-16 NOTE — PLAN OF CARE
Problem: Patient Care Overview  Goal: Plan of Care/Patient Progress Review  Discharge Planner PT   Patient plan for discharge: TCU hopefully today   Current status: SBA for bed mobility supine <> sit. Required increased time for bed mobility. Pt sat EOB to complete LE dressing with mod A (A for sit <> stand and to pull pants up). Pt with small bowel accident requiring total A for clean up. Pt stood x 4 minutes with mod A and FWW for balance. Pt with 1 minor LOB requiring seated break to recover. Pt reports LLE feels unstable. I with UE dressing. Pt limited by pain and impaired balance to progress with functional transfers. Pt reports he has only pivoted to chair. Pt returned to supine with SBA. .SBA with verbal cues for scooting up in bed. Total A to don brace on LLE.   Barriers to return to prior living situation: current level of A, fall risk  Recommendations for discharge: TCU as pt not accepted as ARU candidate   Rationale for recommendations: Pt would benefit from continued OT Services to improve I with ADL's and functional transfers. Pt is very motivated to participate with therapies.        Entered by: Pinky Colmenares 10/16/2018 12:02 PM         Occupational Therapy Discharge Summary    Reason for therapy discharge:    Discharged to transitional care facility.    Progress towards therapy goal(s). See goals on Care Plan in [facility] electronic health record for goal details.  Goals not met.  Barriers to achieving goals:   discharge from facility.    Therapy recommendation(s):    Continued therapy is recommended.  Rationale/Recommendations:  continue skilled OT at next level of care.

## 2018-10-16 NOTE — TELEPHONE ENCOUNTER
[facility] Call Center    Phone Message    May a detailed message be left on voicemail: yes    Reason for Call: Other: Pt had his leg amputaiton due to sepsis infection. His sister-in-law Joyce is requesting a call back to discuss pt.     Action Taken: Message routed to:  [facility] ([facility] Cardiology

## 2018-10-16 NOTE — PROGRESS NOTES
Pt discharged via wheelchair in [facility] transportation, in stable condition with minimal pain.

## 2018-10-16 NOTE — PLAN OF CARE
Problem: Patient Care Overview  Goal: Plan of Care/Patient Progress Review  Outcome: Improving  VSS. Alert/orientated x4, lung sounds diminished, incontinent of stool x 1, voiding well with urinal, up with one assist, denies pain. Mod carb diet. Replaced mepilex on coccyx, repositioned. Tele - NSR with BBB. Probable discharge today, social work following.

## 2018-10-16 NOTE — PLAN OF CARE
Problem: Patient Care Overview  Goal: Plan of Care/Patient Progress Review  Outcome: Improving  VSS. Incontinent to stool. Modified carb diet. Up assist 1 to chair and bed. Pain controlled with oxycodone q3h. Right leg wound dressing changed per MD, KRISTA. Sutures. Scabs open to air. Pressure wound dressing in place CDI. Seen by PT and OT. voiding spontaneously without difficulty. Plan to discharge to [facility].

## 2018-10-17 ENCOUNTER — NURSING HOME VISIT (OUTPATIENT)
Dept: GERIATRICS | Facility: CLINIC | Age: 78
End: 2018-10-17
Payer: MEDICARE

## 2018-10-17 ENCOUNTER — ANTICOAGULATION THERAPY VISIT (OUTPATIENT)
Dept: ANTICOAGULATION | Facility: CLINIC | Age: 78
End: 2018-10-17

## 2018-10-17 DIAGNOSIS — I25.10 CAD S/P PERCUTANEOUS CORONARY ANGIOPLASTY: ICD-10-CM

## 2018-10-17 DIAGNOSIS — E11.22 TYPE 2 DIABETES MELLITUS WITH STAGE 3 CHRONIC KIDNEY DISEASE, UNSPECIFIED WHETHER LONG TERM INSULIN USE: ICD-10-CM

## 2018-10-17 DIAGNOSIS — D63.1 ANEMIA DUE TO STAGE 3 CHRONIC KIDNEY DISEASE (H): ICD-10-CM

## 2018-10-17 DIAGNOSIS — N18.30 CKD (CHRONIC KIDNEY DISEASE) STAGE 3, GFR 30-59 ML/MIN (H): ICD-10-CM

## 2018-10-17 DIAGNOSIS — I50.22 CHRONIC SYSTOLIC HEART FAILURE (H): ICD-10-CM

## 2018-10-17 DIAGNOSIS — I48.91 ATRIAL FIBRILLATION, UNSPECIFIED TYPE (H): ICD-10-CM

## 2018-10-17 DIAGNOSIS — M86.171 OSTEOMYELITIS OF FOOT, RIGHT, ACUTE (H): Primary | ICD-10-CM

## 2018-10-17 DIAGNOSIS — N18.30 ANEMIA DUE TO STAGE 3 CHRONIC KIDNEY DISEASE (H): ICD-10-CM

## 2018-10-17 DIAGNOSIS — N18.3 TYPE 2 DIABETES MELLITUS WITH STAGE 3 CHRONIC KIDNEY DISEASE, UNSPECIFIED WHETHER LONG TERM INSULIN USE: ICD-10-CM

## 2018-10-17 DIAGNOSIS — I73.9 PAD (PERIPHERAL ARTERY DISEASE) (H): ICD-10-CM

## 2018-10-17 DIAGNOSIS — D62 ANEMIA DUE TO BLOOD LOSS, ACUTE: ICD-10-CM

## 2018-10-17 DIAGNOSIS — Z98.61 CAD S/P PERCUTANEOUS CORONARY ANGIOPLASTY: ICD-10-CM

## 2018-10-17 DIAGNOSIS — R53.81 PHYSICAL DECONDITIONING: ICD-10-CM

## 2018-10-17 PROCEDURE — 99207 ZZC CDG-CODE CATEGORY CHANGED: CPT | Performed by: NURSE PRACTITIONER

## 2018-10-17 NOTE — PLAN OF CARE
Problem: Patient Care Overview  Goal: Plan of Care/Patient Progress Review  Physical Therapy Discharge Summary    Reason for therapy discharge:    Discharged to transitional care facility.    Progress towards therapy goal(s). See goals on Care Plan in [facility] electronic health record for goal details.  Goals not met.  Barriers to achieving goals:   discharge from facility.    Therapy recommendation(s):    Continued therapy is recommended.  Rationale/Recommendations: Patient would benefit from continued skilled therapy to further improve strength, balance, and independence with mobility and ambulation to address functional limitations and decrease falls risk.

## 2018-10-17 NOTE — PROGRESS NOTES
[place] [facility]  PRIMARY CARE PROVIDER AND CLINIC:  [facility] Guevara [facility] [address] / [place]*  Chief Complaint   Patient presents with     Hospital F/U     [place] Medical Record Number:  [id number]  Place of Service where encounter took place:  [facility] [[id number]]    HPI:    Tad Manning is a 77 year old  (1940), PMH HTN< CHF, atrial fib, PAD, CKD, DMII, GERD, BPH who presented with right foot osteomyelitis admitted to the above facility from  [facility].  Hospital stay 9/30/18 through 10/16/18.   Right foot osteomyelitis with sepsis: foot infection r/t diabetic foot wound and arterial insufficiency: s/p Right BKA leg amputation with wound vac 10/8 and closure of Right BKA 10/11: Abx of vanco and zosyn on admit, vanco discontinue 10/9 and zosyn changed to augmentin 10/12  Anemia: baseline Hgb 9-10: iron studies suggest ACE and LIA patient received PRBC's 10/3, 10/4, 10/8, 10/13 Hgb at discharge 8.1.   CAD, ischemic cardiomyopathy with acute on chronic biventricular sCHF exacerbation: CABG in 1995, PCI to MLAD 3/2018, echo 7/2018 LVEF 25% most recent echo 10/5/18 LVEF 35-45%: patient seen by cardiology pre op, diuresed with IV lasix weight on admit was 177lbs and at dischart 179lbs  CKD: creat baseline 1.4, on admit creat 2.23 improved throughout stay   PAD: hx of LE stents, LE bypass 2014, WALLACE on 10/1 showed moderate arterial insufficiency on the RLE  Admitted to this facility for  rehab, medical management and nursing care.  HPI information obtained from: facility chart records, facility staff, patient report and [facility] chart review.  Current issues are: on exam today patient is alert, pleasant, states pain in right stump is rated 2-3/10 ongoing, denies phantom limb pain or shooting pain, denies fever, chills, cough, congestion, SOB at rest, states he has a little ALEXIS, denies CP,  palpitations, N/V/D states he has had small BM's past couple of days, states he is sleeping fair at night.           Last 3 BPs: 141/73, 122/60, 140/77 mmHg  HR Ranges: 75-89 bpm  Admission Weight: 10/16/18: 176.6 lbs  B-149 mg/dL    CODE STATUS/ADVANCE DIRECTIVES DISCUSSION:   CPR/Full code   Patient's living condition: with roomate    ALLERGIES:Blood transfusion related (informational only)  PAST MEDICAL HISTORY:  has a past medical history of Arthritis; ASCVD (arteriosclerotic cardiovascular disease); BMI 30.0-30.9,adult; BPH (benign prostatic hypertrophy); Cellulitis; 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); Coronary artery disease; Diabetes mellitus (H); Diabetic polyneuropathy (H); History of blood transfusion; Hyperlipidaemia; Hypertension; Non-healing ulcer of foot (H); Noninflammatory pericardial effusion; Osteomyelitis of left foot (H); PVD (peripheral vascular disease) (H); and Sebaceous cyst. He also has no past medical history of COPD (chronic obstructive pulmonary disease) (H); Difficult intubation; Malignant hyperthermia; PONV (postoperative nausea and vomiting); Spinal headache; or Thyroid disease.  PAST SURGICAL HISTORY:  has a past surgical history that includes tonsillectomy; Bone marrow biopsy, bone specimen, needle/trocar (10/19/2012); vascular surgery; Bypass graft femoropopliteal (1/10/2014); Amputate toe(s) (1/10/2014); Graft vein from extremity (location) (1/10/2014); Cardiac surgery; Excise Cyst Generic (Location) (N/A, 2016); Laminectomy thoracic one level (N/A, 2017); Optical tracking system fusion posterior spine thoracic three+ levels (N/A, 2017); Amputate leg below knee (Right, 10/8/2018); Apply Wound Vac (Right, 10/8/2018); and Amputate revision stump lower extremity (Right, 10/11/2018).  FAMILY HISTORY: family history includes Cancer in his mother.  SOCIAL HISTORY:  reports that he quit smoking about 23 years ago. His smoking  use included Cigarettes. He has a 60.00 pack-year smoking history. He has never used smokeless tobacco. He reports that he does not drink alcohol or use illicit drugs.    Post Discharge Medication Reconciliation Status: discharge medications reconciled, continue medications without change.  Current Outpatient Prescriptions   Medication Sig Dispense Refill     acetaminophen (TYLENOL) 325 MG tablet Take 325 mg by mouth every 4 hours as needed for mild pain       atorvastatin (LIPITOR) 20 MG tablet TAKE 1 TABLET BY MOUTH EVERY DAY 90 tablet 3     bisacodyl (DULCOLAX) 10 MG Suppository Place 1 suppository (10 mg) rectally daily as needed for constipation 30 suppository      cholecalciferol 1000 UNITS TABS Take 1,000 Units by mouth daily 30 tablet      Citalopram Hydrobromide (CELEXA PO) Take 10 mg by mouth daily       clopidogrel (PLAVIX) 75 MG tablet Take 1 tablet (75 mg) by mouth daily 90 tablet 3     ferrous sulfate (IRON) 325 (65 Fe) MG tablet Take 1 tablet (325 mg) by mouth daily 100 tablet      Furosemide (LASIX PO) Take 40 mg by mouth 2 times daily       hydrocortisone (CORTIZONE-10) 1 % ointment Apply topically 2 times daily as needed for itching       insulin aspart (NOVOLOG FLEXPEN) 100 UNIT/ML injection Novolog Flexpen  Give before meals and before bed:  For Pre-Meal Glucose:  140-189 give 1 unit   190-239 give 2 units   240-289 give 3 units   290-339 give 4 units   = or >340 give 5 units     For Bedtime Glucose  200 - 239 give 1 units   240 - 289 give 1.5 units  290 - 339 give 2 units  = or >340 give 2.5 units 15 mL 0     insulin aspart (NOVOLOG PEN) 100 UNIT/ML injection Inject 10 Units Subcutaneous 3 times daily (with meals)       insulin glargine (LANTUS SOLOSTAR) 100 UNIT/ML pen Inject 16 Units Subcutaneous At Bedtime       MELATONIN PO Take 5 mg by mouth daily       metFORMIN (GLUCOPHAGE) 500 MG tablet Take 1 tablet (500 mg) by mouth 2 times daily (with meals) 60 tablet      metoprolol succinate  (TOPROL-XL) 200 MG 24 hr tablet Take 0.5 tablets (100 mg) by mouth daily 90 tablet 3     miconazole (MICATIN; MICRO GUARD) 2 % powder Apply topically 3 times daily       nitroglycerin (NITROSTAT) 0.4 MG sublingual tablet For chest pain place 1 tablet under the tongue every 5 minutes for 3 doses. If symptoms persist 5 minutes after 1st dose call 911. 25 tablet      order for DME Equipment being ordered: 2 shoe 1 Device 0     oxyCODONE IR (ROXICODONE) 5 MG tablet Take 1-2 tablets (5-10 mg) by mouth every 4 hours as needed for pain 20 tablet 0     pantoprazole (PROTONIX) 40 MG EC tablet Take 1 tablet (40 mg) by mouth daily 30 tablet      polyethylene glycol (MIRALAX/GLYCOLAX) Packet Take 17 g by mouth 2 times daily 7 packet      PROVIDER DOSED MANAGED WARFARIN, COUMADIN, OUTPATIENT Dose based on INR per Primary MD/Nursing Home MD.       sennosides (SENOKOT) 8.6 MG tablet Take 2 tablets by mouth 2 times daily 120 each      sodium chloride (EQ SALINE NASAL SPRAY) 0.65 % nasal spray Spray 1 spray into both nostrils daily as needed for congestion 1 Bottle 1     warfarin (COUMADIN) 5 MG tablet Take 0.5 tablets tonight 9/24 and 1 tab tomorrow night 9/25. INR check on Wednesday 9/26 and continue as directed by coumadin clinic. 45 tablet 1       ROS:  10 point ROS of systems including Constitutional, Eyes, Respiratory, Cardiovascular, Gastroenterology, Genitourinary, Integumentary, Musculoskeletal, Psychiatric were all negative except for pertinent positives noted in my HPI.    Exam:  /63  Pulse 76  Temp 97.1  F (36.2  C)  Resp 16  Wt 176 lb 9.6 oz (80.1 kg)  SpO2 98%  BMI 23.95 kg/m2  GENERAL APPEARANCE:  Alert, in no distress  ENT:  Mouth and posterior oropharynx normal, moist mucous membranes, normal hearing acuity  EYES:  EOM, conjunctivae, lids, pupils and irises normal, PERRL  RESP:  respiratory effort and palpation of chest normal, lungs clear to auscultation , no respiratory distress  CV:  Palpation and  auscultation of heart done , regular rate and rhythm, no murmur, rub, or gallop, peripheral edema trace+ in LLE  ABDOMEN:  normal bowel sounds, soft, nontender, no hepatosplenomegaly or other masses  M/S:   Examination of:   right upper extremity, left upper extremity and left lower extremity  Inspection, ROM, stability and muscle strength normal and generalized weakness, BKA RLE  SKIN:  Inspection of skin and subcutaneous tissue baseline, did not visualize right stump incision  NEURO:   Cranial nerves 2-12 are normal tested and grossly at patient's baseline, speech WNL  PSYCH:  affect and mood normal    Lab/Diagnostic data:  CBC RESULTS:   Recent Labs   Lab Test  10/15/18   0819  10/14/18   0757   10/11/18   1657   WBC   --   6.5   --   9.1   RBC   --   2.73*   --   2.62*   HGB  8.1*  8.2*   < >  7.8*   HCT   --   24.2*   --   23.5*   MCV   --   89   --   90   MCH   --   30.0   --   29.8   MCHC   --   33.9   --   33.2   RDW   --   15.9*   --   16.1*   PLT   --   130*   --   152    < > = values in this interval not displayed.       Last Basic Metabolic Panel:  Recent Labs   Lab Test  10/12/18   0802  10/11/18   0821  10/08/18   0812   NA  137   --   136   POTASSIUM  4.1  4.1  3.5   CHLORIDE  103   --   103   JUSTINO  8.4*   --   8.7   CO2  28   --   26   BUN  19   --   19   CR  1.17   --   1.21   GLC  112*   --   171*       Liver Function Studies -   Recent Labs   Lab Test  10/03/18   0636  09/22/18   0042   PROTTOTAL  5.5*  8.0   ALBUMIN  2.0*  4.1   BILITOTAL  0.6  0.9   ALKPHOS  73  126   AST  15  11   ALT  14  27       TSH   Date Value Ref Range Status   10/03/2018 1.11 0.40 - 4.00 mU/L Final       Lab Results   Component Value Date    A1C 9.0 09/22/2018    A1C 6.1 02/10/2017       ASSESSMENT/PLAN:  Osteomyelitis Right Foot -- S/P BKA 10/11/18  Physical deconditioning  Acute s/p right BKA  PT and OT for strengthening, schedule tylenol 1000mg q 6 hours while awake, oxycodone 5-10mg q 4 hours prn,     CAD S/P  percutaneous coronary angioplasty  Chronic systolic heart failure (H)  Atrial fibrillation, unspecified type (H)  Chronic ongoing: daily weights, vitals daily and prn, BMP twice weekly, continue lasix 40mg BID, metoprolol xl 100mg every day, coumadin as directed INR goal of 2-3,   lipitor 20mg at bedtime    Anemia due to blood loss, acute  Anemia due to stage 3 chronic kidney disease (H)  Acute/chronic: did receive 4 units of PRBC's during hospitalization: Hgb twice weekly while in TCU, transfuse if Hgb < 7.0 or patient is symptomatic  Ferrous sulfate 325mg QD    CKD (chronic kidney disease) stage 3, GFR 30-59 ml/min (H)  Chronic: baseline creat 1.4, BMP twice weekly during TCU stay, avoid nephrotoxic medications    Type 2 diabetes mellitus with stage 3 chronic kidney disease, unspecified whether long term insulin use (H)  Ongoing: blood sugar monitoring QID and prn, discontinue lantus blood sugars low, continue  novolog insulin 10 units TID before meals, and sliding scale novolog insulin,  metformin 500mg BID  Will monitor creat closely may be able to increase metformin to PTA 1000mg BID and get patient off insulin  Patient is worried he will be unable to pay for insulin and has not needed insulin PTA  Hold novolog insulin for blood sugar < 120      PAD (peripheral artery disease) (H)  Ongoing: continue lipitor 20mg at bedtime, plavix 75mg every day,        Orders:  BMP and Hgb twice weekly  Tylenol 1000mg q 6 hours while awake  discontinue lantus  Hold novolog for insulin < 120        Electronically signed by:  Tonya Lynn Haase, APRN CNP

## 2018-10-17 NOTE — LETTER
10/17/2018        RE: Tad Manning  [address]  [place] [address]        [place] [facility]  PR[facility] CARE PROVID[facility] AND CLINIC[facility]  [facility] [address] [facility]*  Chief Complaint   Patient presents with     Hospital F/U     [place] Medical Record Number:  [id number]  Place of Service where encounter took place:  [facility] ([facility]) [[id number]]    HPI:    Tad Manning is a 77 year old  (1940), PMH HTN< CHF, atrial fib, PAD, CKD, DMII, GERD, BPH who presented with right foot osteomyelitis admitted to the above facility from  [facility].  Hospital stay 9/30/18 through 10/16/18.   Right foot osteomyelitis with sepsis: foot infection r/t diabetic foot wound and arterial insufficiency: s/p Right BKA leg amputation with wound vac 10/8 and closure of Right BKA 10/11: Abx of vanco and zosyn on admit, vanco discontinue 10/9 and zosyn changed to augmentin 10/12  Anemia: baseline Hgb 9-10: iron studies suggest ACE and LIA patient received PRBC's 10/3, 10/4, 10/8, 10/13 Hgb at discharge 8.1.   CAD, ischemic cardiomyopathy with acute on chronic biventricular sCHF exacerbation: CABG in 1995, PCI to MLAD 3/2018, echo 7/2018 LVEF 25% most recent echo 10/5/18 LVEF 35-45%: patient seen by cardiology pre op, diuresed with IV lasix weight on admit was 177lbs and at dischart 179lbs  CKD: creat baseline 1.4, on admit creat 2.23 improved throughout stay   PAD: hx of LE stents, LE bypass 2014, WALLACE on 10/1 showed moderate arterial insufficiency on the RLE  Admitted to this facility for  rehab, medical management and nursing care.  HPI information obtained from: facility chart records, facility staff, patient report and [facility] chart review.  Current issues are: on exam today patient is alert, pleasant, states pain in right stump is rated 2-3/10 ongoing, denies phantom limb pain or shooting  pain, denies fever, chills, cough, congestion, SOB at rest, states he has a little ALEXIS, denies CP, palpitations, N/V/D states he has had small BM's past couple of days, states he is sleeping fair at night.           Last 3 BPs: 141/73, 122/60, 140/77 mmHg  HR Ranges: 75-89 bpm  Admission Weight: 10/16/18: 176.6 lbs  B-149 mg/dL    CODE STATUS/ADVANCE DIRECTIVES DISCUSSION:   CPR/Full code   Patient's living condition: with roomate    ALLERGIES:Blood transfusion related (informational only)  PAST MEDICAL HISTORY:  has a past medical history of Arthritis; ASCVD (arteriosclerotic cardiovascular disease); BMI 30.0-30.9,adult; BPH (benign prostatic hypertrophy); Cellulitis; 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); Coronary artery disease; Diabetes mellitus (H); Diabetic polyneuropathy (H); History of blood transfusion; Hyperlipidaemia; Hypertension; Non-healing ulcer of foot (H); Noninflammatory pericardial effusion; Osteomyelitis of left foot (H); PVD (peripheral vascular disease) (H); and Sebaceous cyst. He also has no past medical history of COPD (chronic obstructive pulmonary disease) (H); Difficult intubation; Malignant hyperthermia; PONV (postoperative nausea and vomiting); Spinal headache; or Thyroid disease.  PAST SURGICAL HISTORY:  has a past surgical history that includes tonsillectomy; Bone marrow biopsy, bone specimen, needle/trocar (10/19/2012); vascular surgery; Bypass graft femoropopliteal (1/10/2014); Amputate toe(s) (1/10/2014); Graft vein from extremity (location) (1/10/2014); Cardiac surgery; Excise Cyst Generic (Location) (N/A, 2016); Laminectomy thoracic one level (N/A, 2017); Optical tracking system fusion posterior spine thoracic three+ levels (N/A, 2017); Amputate leg below knee (Right, 10/8/2018); Apply Wound Vac (Right, 10/8/2018); and Amputate revision stump lower extremity (Right, 10/11/2018).  FAMILY HISTORY: family history includes  Cancer in his mother.  SOCIAL HISTORY:  reports that he quit smoking about 23 years ago. His smoking use included Cigarettes. He has a 60.00 pack-year smoking history. He has never used smokeless tobacco. He reports that he does not drink alcohol or use illicit drugs.    Post Discharge Medication Reconciliation Status: discharge medications reconciled, continue medications without change.  Current Outpatient Prescriptions   Medication Sig Dispense Refill     acetaminophen (TYLENOL) 325 MG tablet Take 325 mg by mouth every 4 hours as needed for mild pain       atorvastatin (LIPITOR) 20 MG tablet TAKE 1 TABLET BY MOUTH EVERY DAY 90 tablet 3     bisacodyl (DULCOLAX) 10 MG Suppository Place 1 suppository (10 mg) rectally daily as needed for constipation 30 suppository      cholecalciferol 1000 UNITS TABS Take 1,000 Units by mouth daily 30 tablet      Citalopram Hydrobromide (CELEXA PO) Take 10 mg by mouth daily       clopidogrel (PLAVIX) 75 MG tablet Take 1 tablet (75 mg) by mouth daily 90 tablet 3     ferrous sulfate (IRON) 325 (65 Fe) MG tablet Take 1 tablet (325 mg) by mouth daily 100 tablet      Furosemide (LASIX PO) Take 40 mg by mouth 2 times daily       hydrocortisone (CORTIZONE-10) 1 % ointment Apply topically 2 times daily as needed for itching       insulin aspart (NOVOLOG FLEXPEN) 100 UNIT/ML injection Novolog Flexpen  Give before meals and before bed:  For Pre-Meal Glucose:  140-189 give 1 unit   190-239 give 2 units   240-289 give 3 units   290-339 give 4 units   = or >340 give 5 units     For Bedtime Glucose  200 - 239 give 1 units   240 - 289 give 1.5 units  290 - 339 give 2 units  = or >340 give 2.5 units 15 mL 0     insulin aspart (NOVOLOG PEN) 100 UNIT/ML injection Inject 10 Units Subcutaneous 3 times daily (with meals)       insulin glargine (LANTUS SOLOSTAR) 100 UNIT/ML pen Inject 16 Units Subcutaneous At Bedtime       MELATONIN PO Take 5 mg by mouth daily       metFORMIN (GLUCOPHAGE) 500 MG tablet  Take 1 tablet (500 mg) by mouth 2 times daily (with meals) 60 tablet      metoprolol succinate (TOPROL-XL) 200 MG 24 hr tablet Take 0.5 tablets (100 mg) by mouth daily 90 tablet 3     miconazole (MICATIN; MICRO GUARD) 2 % powder Apply topically 3 times daily       nitroglycerin (NITROSTAT) 0.4 MG sublingual tablet For chest pain place 1 tablet under the tongue every 5 minutes for 3 doses. If symptoms persist 5 minutes after 1st dose call 911. 25 tablet      order for DME Equipment being ordered: 2 shoe 1 Device 0     oxyCODONE IR (ROXICODONE) 5 MG tablet Take 1-2 tablets (5-10 mg) by mouth every 4 hours as needed for pain 20 tablet 0     pantoprazole (PROTONIX) 40 MG EC tablet Take 1 tablet (40 mg) by mouth daily 30 tablet      polyethylene glycol (MIRALAX/GLYCOLAX) Packet Take 17 g by mouth 2 times daily 7 packet      PROVIDER DOSED MANAGED WARFARIN, COUMADIN, OUTPATIENT Dose based on INR per Primary MD/Nursing Home MD.       sennosides (SENOKOT) 8.6 MG tablet Take 2 tablets by mouth 2 times daily 120 each      sodium chloride (EQ SALINE NASAL SPRAY) 0.65 % nasal spray Spray 1 spray into both nostrils daily as needed for congestion 1 Bottle 1     warfarin (COUMADIN) 5 MG tablet Take 0.5 tablets tonight 9/24 and 1 tab tomorrow night 9/25. INR check on Wednesday 9/26 and continue as directed by coumadin clinic. 45 tablet 1       ROS:  10 point ROS of systems including Constitutional, Eyes, Respiratory, Cardiovascular, Gastroenterology, Genitourinary, Integumentary, Musculoskeletal, Psychiatric were all negative except for pertinent positives noted in my HPI.    Exam:  /63  Pulse 76  Temp 97.1  F (36.2  C)  Resp 16  Wt 176 lb 9.6 oz (80.1 kg)  SpO2 98%  BMI 23.95 kg/m2  GENERAL APPEARANCE:  Alert, in no distress  ENT:  Mouth and posterior oropharynx normal, moist mucous membranes, normal hearing acuity  EYES:  EOM, conjunctivae, lids, pupils and irises normal, PERRL  RESP:  respiratory effort and  palpation of chest normal, lungs clear to auscultation , no respiratory distress  CV:  Palpation and auscultation of heart done , regular rate and rhythm, no murmur, rub, or gallop, peripheral edema trace+ in LLE  ABDOMEN:  normal bowel sounds, soft, nontender, no hepatosplenomegaly or other masses  M/S:   Examination of:   right upper extremity, left upper extremity and left lower extremity  Inspection, ROM, stability and muscle strength normal and generalized weakness, BKA RLE  SKIN:  Inspection of skin and subcutaneous tissue baseline, did not visualize right stump incision  NEURO:   Cranial nerves 2-12 are normal tested and grossly at patient's baseline, speech WNL  PSYCH:  affect and mood normal    Lab/Diagnostic data:  CBC RESULTS:   Recent Labs   Lab Test  10/15/18   0819  10/14/18   0757   10/11/18   1657   WBC   --   6.5   --   9.1   RBC   --   2.73*   --   2.62*   HGB  8.1*  8.2*   < >  7.8*   HCT   --   24.2*   --   23.5*   MCV   --   89   --   90   MCH   --   30.0   --   29.8   MCHC   --   33.9   --   33.2   RDW   --   15.9*   --   16.1*   PLT   --   130*   --   152    < > = values in this interval not displayed.       Last Basic Metabolic Panel:  Recent Labs   Lab Test  10/12/18   0802  10/11/18   0821  10/08/18   0812   NA  137   --   136   POTASSIUM  4.1  4.1  3.5   CHLORIDE  103   --   103   JUSTINO  8.4*   --   8.7   CO2  28   --   26   BUN  19   --   19   CR  1.17   --   1.21   GLC  112*   --   171*       Liver Function Studies -   Recent Labs   Lab Test  10/03/18   0636  09/22/18   0042   PROTTOTAL  5.5*  8.0   ALBUMIN  2.0*  4.1   BILITOTAL  0.6  0.9   ALKPHOS  73  126   AST  15  11   ALT  14  27       TSH   Date Value Ref Range Status   10/03/2018 1.11 0.40 - 4.00 mU/L Final       Lab Results   Component Value Date    A1C 9.0 09/22/2018    A1C 6.1 02/10/2017       ASSESSMENT/PLAN:  Osteomyelitis Right Foot -- S/P BKA 10/11/18  Physical deconditioning  Acute s/p right BKA  PT and OT for  strengthening, schedule tylenol 1000mg q 6 hours while awake, oxycodone 5-10mg q 4 hours prn,     CAD S/P percutaneous coronary angioplasty  Chronic systolic heart failure (H)  Atrial fibrillation, unspecified type (H)  Chronic ongoing: daily weights, vitals daily and prn, BMP twice weekly, continue lasix 40mg BID, metoprolol xl 100mg every day, coumadin as directed INR goal of 2-3,   lipitor 20mg at bedtime    Anemia due to blood loss, acute  Anemia due to stage 3 chronic kidney disease (H)  Acute/chronic: did receive 4 units of PRBC's during hospitalization: Hgb twice weekly while in TCU, transfuse if Hgb < 7.0 or patient is symptomatic  Ferrous sulfate 325mg QD    CKD (chronic kidney disease) stage 3, GFR 30-59 ml/min (H)  Chronic: baseline creat 1.4, BMP twice weekly during TCU stay, avoid nephrotoxic medications    Type 2 diabetes mellitus with stage 3 chronic kidney disease, unspecified whether long term insulin use (H)  Ongoing: blood sugar monitoring QID and prn, discontinue lantus blood sugars low, continue  novolog insulin 10 units TID before meals, and sliding scale novolog insulin,  metformin 500mg BID  Will monitor creat closely may be able to increase metformin to PTA 1000mg BID and get patient off insulin  Patient is worried he will be unable to pay for insulin and has not needed insulin PTA  Hold novolog insulin for blood sugar < 120      PAD (peripheral artery disease) (H)  Ongoing: continue lipitor 20mg at bedtime, plavix 75mg every day,        Orders:  BMP and Hgb twice weekly  Tylenol 1000mg q 6 hours while awake  discontinue lantus  Hold novolog for insulin < 120        Electronically signed by:  Tonya Lynn Haase, APRN CNP                    Sincerely,        Tonya Lynn Haase, APRN CNP

## 2018-10-17 NOTE — MR AVS SNAPSHOT
Tad Manning   10/17/2018   Anticoagulation Therapy Visit    Description:  77 year old male   Provider:  Chelsea Bland, RN   Department:  [facility]           INR as of 10/17/2018     Today's INR No new INR was available at the time of this encounter.      Anticoagulation Summary as of 10/17/2018     INR goal 2.0-3.0   Today's INR No new INR was available at the time of this encounter.   Full warfarin instructions 7.5 mg on Mon, Wed, Fri; 5 mg all other days   Next INR check 11/17/2018    Indications   Atrial fibrillation -- on Warfarin [I48.91]  Long-term (current) use of anticoagulants [Z79.01] [Z79.01]         October 2018 Details    Sun Mon Tue Wed Thu Fri Sat      1               2               3               4               5               6                 7               8               9               10               11               12               13                 14               15               16               17      7.5 mg   See details      18      5 mg         19      7.5 mg         20      5 mg           21      5 mg         22      7.5 mg         23      5 mg         24      7.5 mg         25      5 mg         26      7.5 mg         27      5 mg           28      5 mg         29      7.5 mg         30      5 mg         31      7.5 mg             Date Details   10/17 This INR check               How to take your warfarin dose     To take:  5 mg Take 1 of the 5 mg tablets.    To take:  7.5 mg Take 1.5 of the 5 mg tablets.           November 2018 Details    Sun Mon Tue Wed Thu Fri Sat         1      5 mg         2      7.5 mg         3      5 mg           4      5 mg         5      7.5 mg         6      5 mg         7      7.5 mg         8      5 mg         9      7.5 mg         10      5 mg           11      5 mg         12      7.5 mg         13      5 mg         14      7.5 mg         15      5 mg         16      7.5 mg         17              18               19                20               21               22               23               24                 25               26               27               28               29               30                 Date Details   No additional details    Date of next INR:  11/17/2018         How to take your warfarin dose     To take:  5 mg Take 1 of the 5 mg tablets.    To take:  7.5 mg Take 1.5 of the 5 mg tablets.

## 2018-10-17 NOTE — PROGRESS NOTES
Dates of hospitalization: 9/ 30  to 10/16 at [facility]  Reason for hospitalization: RLE BKA for non-healing ulcer on right foot.  Acute blood loss anemia, and physical deconditioning.  Procedures performed: surgery, blood products, wound vac  Vitamin K or FFP administered? no  INR Goal Range Confirmed to be:2-3  Inpatient warfarin doses added to calendar? yes  Medication changes at discharge: Please see med list  Warfarin dosing after DC: 5mg  Patient discharged on Lovenox? -no  Next INR date: 10/17  Where is the patient discharging to? (home, TCU, staying locally, etc.): [facility] [phone]  Will patient have home care? no

## 2018-10-18 ENCOUNTER — TRANSFERRED RECORDS (OUTPATIENT)
Dept: HEALTH INFORMATION MANAGEMENT | Facility: CLINIC | Age: 78
End: 2018-10-18

## 2018-10-18 LAB
BUN SERPL-MCNC: 19 MG/DL (ref 9–26)
CALCIUM SERPL-MCNC: 8.9 MG/DL (ref 8.4–10.4)
CHLORIDE SERPLBLD-SCNC: 103 MMOL/L (ref 98–109)
CO2 SERPL-SCNC: 23 MMOL/L (ref 22–31)
COPATH REPORT: NORMAL
CREAT SERPL-MCNC: 1.04 MG/DL (ref 0.73–1.18)
GFR SERPL CREATININE-BSD FRML MDRD: >60 ML/MIN/1.73M2
GLUCOSE SERPL-MCNC: 149 MG/DL (ref 70–100)
HEMOGLOBIN: 8 G/DL (ref 13.4–17.5)
POTASSIUM SERPL-SCNC: 3.9 MMOL/L (ref 3.5–5.2)
SODIUM SERPL-SCNC: 135 MMOL/L (ref 136–145)

## 2018-10-22 ENCOUNTER — TRANSFERRED RECORDS (OUTPATIENT)
Dept: HEALTH INFORMATION MANAGEMENT | Facility: CLINIC | Age: 78
End: 2018-10-22

## 2018-10-22 LAB
DIFFERENTIAL: ABNORMAL
ERYTHROCYTE [DISTWIDTH] IN BLOOD BY AUTOMATED COUNT: 16.5 % (ref 11–15)
HCT VFR BLD AUTO: 27 % (ref 39–51)
HEMOGLOBIN: 8.5 G/DL (ref 13.4–17.5)
MCV RBC AUTO: 91.8 FL (ref 80–100)
PLATELET # BLD AUTO: 188 K/CMM (ref 140–450)
RBC # BLD AUTO: 2.94 M/CMM (ref 4.2–5.9)
WBC # BLD AUTO: 4.2 K/CMM (ref 3.8–11)

## 2018-10-24 VITALS
DIASTOLIC BLOOD PRESSURE: 60 MMHG | TEMPERATURE: 96.3 F | BODY MASS INDEX: 23.25 KG/M2 | SYSTOLIC BLOOD PRESSURE: 127 MMHG | HEART RATE: 77 BPM | OXYGEN SATURATION: 96 % | RESPIRATION RATE: 14 BRPM | WEIGHT: 171.4 LBS

## 2018-10-24 RX ORDER — POLYETHYLENE GLYCOL 3350 17 G/17G
1 POWDER, FOR SOLUTION ORAL DAILY PRN
COMMUNITY
End: 2021-09-22

## 2018-10-25 NOTE — PROGRESS NOTES
[place] [facility]    Chief Complaint   Patient presents with     RECHECK       [place] Medical Record Number:  [id number]  Place of Service where encounter took place:  [facility] ([facility]) [[id number]]    HPI:    Tad Manning is a 77 year old  (1940), who is being seen today for an episodic care visit.  HPI information obtained from: facility chart records, facility staff, patient report and [facility] chart review.Today's concern is:  Osteomyelitis s/p right BKA: On exam today patient is alert, pleasant, states pain in right stump/leg is well controlled, he is working with therapy doing some hopping using a RW up to 27 feet with CTG assist  Anemia : see labs  CKD: see labs  CHF/atrial fib: Last 3 BPs: 127/60, 132/62, 144/64 mmHg  HR Ranges: 66-77 bpm patient denies CP, palpitations, SOB, cough  Admission Weight: 10/16/18: 176.6 lbs  Current Weights: 10/21/18: 173.1 lbs - 10/24/18: 171.4 lbs  BG  DMII: AM: 142-281 mg/dL  NOON: 179-271 mg/dL  PM: 107-240 mg/dL    ALLERGIES: Blood transfusion related (informational only)  Past Medical, Surgical, Family and Social History reviewed and updated in [facility].    Current Outpatient Prescriptions   Medication Sig Dispense Refill     Acetaminophen (TYLENOL PO) Take 1,000 mg by mouth every 6 hours       atorvastatin (LIPITOR) 20 MG tablet TAKE 1 TABLET BY MOUTH EVERY DAY 90 tablet 3     bisacodyl (DULCOLAX) 10 MG Suppository Place 1 suppository (10 mg) rectally daily as needed for constipation 30 suppository      cholecalciferol 1000 UNITS TABS Take 1,000 Units by mouth daily 30 tablet      Citalopram Hydrobromide (CELEXA PO) Take 10 mg by mouth daily       clopidogrel (PLAVIX) 75 MG tablet Take 1 tablet (75 mg) by mouth daily 90 tablet 3     ferrous sulfate (IRON) 325 (65 Fe) MG tablet Take 1 tablet (325 mg) by mouth daily 100 tablet      Furosemide (LASIX PO) Take 40 mg by mouth daily        hydrocortisone (CORTIZONE-10) 1 % ointment Apply  topically 2 times daily as needed for itching       insulin aspart (NOVOLOG FLEXPEN) 100 UNIT/ML injection Novolog Flexpen  Give before meals and before bed:  For Pre-Meal Glucose:  140-189 give 1 unit   190-239 give 2 units   240-289 give 3 units   290-339 give 4 units   = or >340 give 5 units     For Bedtime Glucose  200 - 239 give 1 units   240 - 289 give 1.5 units  290 - 339 give 2 units  = or >340 give 2.5 units 15 mL 0     MELATONIN PO Take 5 mg by mouth daily       metFORMIN (GLUCOPHAGE) 500 MG tablet Take 1 tablet (500 mg) by mouth 2 times daily (with meals) 60 tablet      metoprolol succinate (TOPROL-XL) 200 MG 24 hr tablet Take 0.5 tablets (100 mg) by mouth daily 90 tablet 3     miconazole (MICATIN; MICRO GUARD) 2 % powder Apply topically 3 times daily       nitroglycerin (NITROSTAT) 0.4 MG sublingual tablet For chest pain place 1 tablet under the tongue every 5 minutes for 3 doses. If symptoms persist 5 minutes after 1st dose call 911. 25 tablet      order for DME Equipment being ordered: 2 shoe 1 Device 0     oxyCODONE IR (ROXICODONE) 5 MG tablet Take 1-2 tablets (5-10 mg) by mouth every 4 hours as needed for pain 20 tablet 0     pantoprazole (PROTONIX) 40 MG EC tablet Take 1 tablet (40 mg) by mouth daily 30 tablet      polyethylene glycol (MIRALAX/GLYCOLAX) Packet Take 1 packet by mouth daily       PROVIDER DOSED MANAGED WARFARIN, COUMADIN, OUTPATIENT Dose based on INR per Primary MD/Nursing Home MD.       sodium chloride (EQ SALINE NASAL SPRAY) 0.65 % nasal spray Spray 1 spray into both nostrils daily as needed for congestion 1 Bottle 1     warfarin (COUMADIN) 5 MG tablet Take 0.5 tablets tonight 9/24 and 1 tab tomorrow night 9/25. INR check on Wednesday 9/26 and continue as directed by coumadin clinic. 45 tablet 1     Medications reviewed:  Medications reconciled to facility chart and changes were made to reflect current medications as identified as above med list. Below are the changes that were  made:   Medications stopped since last EPIC medication reconciliation:   Medications Discontinued During This Encounter   Medication Reason     polyethylene glycol (MIRALAX/GLYCOLAX) Packet Therapy completed     acetaminophen (TYLENOL) 325 MG tablet Therapy completed     insulin aspart (NOVOLOG PEN) 100 UNIT/ML injection Therapy completed     insulin glargine (LANTUS SOLOSTAR) 100 UNIT/ML pen Therapy completed     sennosides (SENOKOT) 8.6 MG tablet Therapy completed       Medications started since last [facility] medication reconciliation:  Orders Placed This Encounter   Medications     polyethylene glycol (MIRALAX/GLYCOLAX) Packet     Sig: Take 1 packet by mouth daily     Acetaminophen (TYLENOL PO)     Sig: Take 1,000 mg by mouth every 6 hours         REVIEW OF SYSTEMS:  10 point ROS of systems including Constitutional, Eyes, Respiratory, Cardiovascular, Gastroenterology, Genitourinary, Integumentary, Musculoskeletal, Psychiatric were all negative except for pertinent positives noted in my HPI.    Physical Exam:  /60  Pulse 77  Temp 96.3  F (35.7  C)  Resp 14  Wt 171 lb 6.4 oz (77.7 kg)  SpO2 96%  BMI 23.25 kg/m2  GENERAL APPEARANCE:  Alert, in no distress  ENT:  Mouth and posterior oropharynx normal, moist mucous membranes, normal hearing acuity  EYES:  EOM, conjunctivae, lids, pupils and irises normal, PERRL  RESP:  respiratory effort and palpation of chest normal, lungs clear to auscultation , no respiratory distress  CV:  Palpation and auscultation of heart done , regular rate and rhythm, no murmur, rub, or gallop, peripheral edema trace+ in LLE  ABDOMEN:  normal bowel sounds, soft, nontender, no hepatosplenomegaly or other masses  M/S:   Examination of:   right upper extremity, left upper extremity and left lower extremity  Inspection, ROM, stability and muscle strength normal and generalized weakness, BKA RLE  SKIN:  Inspection of skin and subcutaneous tissue baseline, did not visualize right stump  incision  NEURO:   Cranial nerves 2-12 are normal tested and grossly at patient's baseline, speech WNL  PSYCH:  affect and mood normal    Recent Labs:   CBC RESULTS:    Recent Labs   Lab Test 10/25/18 10/22/18 10/18/18   10/14/18   0757   10/11/18   1657   WBC   4.2   --    --   6.5   --   9.1   RBC   2.94*   --    --   2.73*   --   2.62*   HGB 8.7  8.5*  8.0*   < >  8.2*   < >  7.8*   HCT   27.0*   --    --   24.2*   --   23.5*   MCV   91.8   --    --   89   --   90   MCH    --    --    --   30.0   --   29.8   MCHC    --    --    --   33.9   --   33.2   RDW   16.5*   --    --   15.9*   --   16.1*   PLT   188   --    --   130*   --   152     < > = values in this interval not displayed.       Last Basic Metabolic Panel:  Lab Test 10/25/18 10/22/18 10/18/18  10/12/18   0802    138  135*  137   POTASSIUM 3.9 3.8  3.9  4.1   CHLORIDE 106 107  103  103   JUSTINO 9.0 8.8  8.9  8.4*   CO2 24 20  23  28   BUN 17 16  19  19   CR 0.87 0.99  1.04  1.17    155  149*  112*       Liver Function Studies -   Recent Labs   Lab Test  10/03/18   0636  09/22/18   0042   PROTTOTAL  5.5*  8.0   ALBUMIN  2.0*  4.1   BILITOTAL  0.6  0.9   ALKPHOS  73  126   AST  15  11   ALT  14  27       TSH   Date Value Ref Range Status   10/03/2018 1.11 0.40 - 4.00 mU/L Final       Lab Results   Component Value Date    A1C 9.0 09/22/2018    A1C 6.1 02/10/2017         INRS:  10/26: 1.9  10/24: 2.4  10/22: 2.6  10/19: 2.0  10/17: 1.3      Assessment/Plan:  Osteomyelitis Right Foot -- S/P BKA 10/11/18  Physical deconditioning  Acute s/p right BKA  PT and OT for strengthening, continue tylenol 1000mg q 6 hours while awake, oxycodone 5-10mg q 4 hours prn,      CAD S/P percutaneous coronary angioplasty  Chronic systolic heart failure (H)  Atrial fibrillation, unspecified type (H)  Chronic ongoing: daily weights, vitals daily and prn, BMP twice weekly, continue lasix 40mg BID, metoprolol xl 100mg every day, coumadin as directed INR goal of 2-3,    lipitor 20mg at bedtime     Anemia due to blood loss, acute  Anemia due to stage 3 chronic kidney disease (H)  Acute/chronic: did receive 4 units of PRBC's during hospitalization: Hgb twice weekly while in TCU, transfuse if Hgb < 7.0 or patient is symptomatic  Ferrous sulfate 325mg QD     CKD (chronic kidney disease) stage 3, GFR 30-59 ml/min (H)  Chronic: baseline creat 1.4, BMP twice weekly during TCU stay, avoid nephrotoxic medications     Type 2 diabetes mellitus with stage 3 chronic kidney disease, unspecified whether long term insulin use (H)  Ongoing: blood sugar monitoring QID and prn, DC lantus and novolog before meals, continue sliding scale novolog insulin,  increase metformin to 750 mg BID, creat stable at this point will continue to monitor closely       PAD (peripheral artery disease) (H)  Ongoing: continue lipitor 20mg at bedtime, plavix 75mg every day,          Orders:  Increase metformin to 750mg BID  BMP twice weekly    Electronically signed by  Tonya Lynn Haase, APRN CNP

## 2018-10-26 ENCOUNTER — NURSING HOME VISIT (OUTPATIENT)
Dept: GERIATRICS | Facility: CLINIC | Age: 78
End: 2018-10-26
Payer: MEDICARE

## 2018-10-26 DIAGNOSIS — M86.171 OSTEOMYELITIS OF FOOT, RIGHT, ACUTE (H): Primary | ICD-10-CM

## 2018-10-26 DIAGNOSIS — I48.91 ATRIAL FIBRILLATION, UNSPECIFIED TYPE (H): ICD-10-CM

## 2018-10-26 DIAGNOSIS — R53.81 PHYSICAL DECONDITIONING: ICD-10-CM

## 2018-10-26 DIAGNOSIS — N18.30 CKD (CHRONIC KIDNEY DISEASE) STAGE 3, GFR 30-59 ML/MIN (H): ICD-10-CM

## 2018-10-26 DIAGNOSIS — I50.22 CHRONIC SYSTOLIC HEART FAILURE (H): ICD-10-CM

## 2018-10-26 DIAGNOSIS — E11.22 TYPE 2 DIABETES MELLITUS WITH STAGE 3 CHRONIC KIDNEY DISEASE, UNSPECIFIED WHETHER LONG TERM INSULIN USE: ICD-10-CM

## 2018-10-26 DIAGNOSIS — N18.30 ANEMIA DUE TO STAGE 3 CHRONIC KIDNEY DISEASE (H): ICD-10-CM

## 2018-10-26 DIAGNOSIS — D62 ANEMIA DUE TO BLOOD LOSS, ACUTE: ICD-10-CM

## 2018-10-26 DIAGNOSIS — D63.1 ANEMIA DUE TO STAGE 3 CHRONIC KIDNEY DISEASE (H): ICD-10-CM

## 2018-10-26 DIAGNOSIS — Z98.61 CAD S/P PERCUTANEOUS CORONARY ANGIOPLASTY: ICD-10-CM

## 2018-10-26 DIAGNOSIS — I25.10 CAD S/P PERCUTANEOUS CORONARY ANGIOPLASTY: ICD-10-CM

## 2018-10-26 DIAGNOSIS — N18.3 TYPE 2 DIABETES MELLITUS WITH STAGE 3 CHRONIC KIDNEY DISEASE, UNSPECIFIED WHETHER LONG TERM INSULIN USE: ICD-10-CM

## 2018-10-26 DIAGNOSIS — I73.9 PAD (PERIPHERAL ARTERY DISEASE) (H): ICD-10-CM

## 2018-10-26 PROCEDURE — 99309 SBSQ NF CARE MODERATE MDM 30: CPT | Performed by: NURSE PRACTITIONER

## 2018-10-26 NOTE — LETTER
10/26/2018        RE: Tad Manning  [address] [address] [address]        [place] [facility]    Chief Complaint   Patient presents with     RECHECK       [facility] Medical Record Number:  [id number]  Place of Service where encounter took place:  [facility] ([facility] [[id number]]    HPI:    Tad Manning is a 77 year old  (1940), who is being seen today for an episodic care visit.  HPI information obtained from: facility chart records, facility staff, patient report and [facility] chart review.Today's concern is:  Osteomyelitis s/p right BKA: On exam today patient is alert, pleasant, states pain in right stump/leg is well controlled, he is working with therapy doing some hopping using a RW up to 27 feet with CTG assist  Anemia : see labs  CKD: see labs  CHF/atrial fib: Last 3 BPs: 127/60, 132/62, 144/64 mmHg  HR Ranges: 66-77 bpm patient denies CP, palpitations, SOB, cough  Admission Weight: 10/16/18: 176.6 lbs  Current Weights: 10/21/18: 173.1 lbs - 10/24/18: 171.4 lbs  BG  DMII: AM: 142-281 mg/dL  NOON: 179-271 mg/dL  PM: 107-240 mg/dL    ALLERGIES: Blood transfusion related (informational only)  Past Medical, Surgical, Family and Social History reviewed and updated in [facility].    Current Outpatient Prescriptions   Medication Sig Dispense Refill     Acetaminophen (TYLENOL PO) Take 1,000 mg by mouth every 6 hours       atorvastatin (LIPITOR) 20 MG tablet TAKE 1 TABLET BY MOUTH EVERY DAY 90 tablet 3     bisacodyl (DULCOLAX) 10 MG Suppository Place 1 suppository (10 mg) rectally daily as needed for constipation 30 suppository      cholecalciferol 1000 UNITS TABS Take 1,000 Units by mouth daily 30 tablet      Citalopram Hydrobromide (CELEXA PO) Take 10 mg by mouth daily       clopidogrel (PLAVIX) 75 MG tablet Take 1 tablet (75 mg) by mouth daily 90 tablet 3     ferrous sulfate (IRON) 325 (65 Fe) MG tablet Take 1 tablet (325 mg) by mouth daily 100 tablet       Furosemide (LASIX PO) Take 40 mg by mouth daily        hydrocortisone (CORTIZONE-10) 1 % ointment Apply topically 2 times daily as needed for itching       insulin aspart (NOVOLOG FLEXPEN) 100 UNIT/ML injection Novolog Flexpen  Give before meals and before bed:  For Pre-Meal Glucose:  140-189 give 1 unit   190-239 give 2 units   240-289 give 3 units   290-339 give 4 units   = or >340 give 5 units     For Bedtime Glucose  200 - 239 give 1 units   240 - 289 give 1.5 units  290 - 339 give 2 units  = or >340 give 2.5 units 15 mL 0     MELATONIN PO Take 5 mg by mouth daily       metFORMIN (GLUCOPHAGE) 500 MG tablet Take 1 tablet (500 mg) by mouth 2 times daily (with meals) 60 tablet      metoprolol succinate (TOPROL-XL) 200 MG 24 hr tablet Take 0.5 tablets (100 mg) by mouth daily 90 tablet 3     miconazole (MICATIN; MICRO GUARD) 2 % powder Apply topically 3 times daily       nitroglycerin (NITROSTAT) 0.4 MG sublingual tablet For chest pain place 1 tablet under the tongue every 5 minutes for 3 doses. If symptoms persist 5 minutes after 1st dose call 911. 25 tablet      order for DME Equipment being ordered: DH2 shoe 1 Device 0     oxyCODONE IR (ROXICODONE) 5 MG tablet Take 1-2 tablets (5-10 mg) by mouth every 4 hours as needed for pain 20 tablet 0     pantoprazole (PROTONIX) 40 MG EC tablet Take 1 tablet (40 mg) by mouth daily 30 tablet      polyethylene glycol (MIRALAX/GLYCOLAX) Packet Take 1 packet by mouth daily       PROVIDER DOSED MANAGED WARFARIN, COUMADIN, OUTPATIENT Dose based on INR per Primary MD/Nursing Home MD.       sodium chloride (EQ SALINE NASAL SPRAY) 0.65 % nasal spray Spray 1 spray into both nostrils daily as needed for congestion 1 Bottle 1     warfarin (COUMADIN) 5 MG tablet Take 0.5 tablets tonight 9/24 and 1 tab tomorrow night 9/25. INR check on Wednesday 9/26 and continue as directed by coumadin clinic. 45 tablet 1     Medications reviewed:  Medications reconciled to facility chart and changes were  made to reflect current medications as identified as above med list. Below are the changes that were made:   Medications stopped since last EPIC medication reconciliation:   Medications Discontinued During This Encounter   Medication Reason     polyethylene glycol (MIRALAX/GLYCOLAX) Packet Therapy completed     acetaminophen (TYLENOL) 325 MG tablet Therapy completed     insulin aspart (NOVOLOG PEN) 100 UNIT/ML injection Therapy completed     insulin glargine (LANTUS SOLOSTAR) 100 UNIT/ML pen Therapy completed     sennosides (SENOKOT) 8.6 MG tablet Therapy completed       Medications started since last [facility] medication reconciliation:  Orders Placed This Encounter   Medications     polyethylene glycol (MIRALAX/GLYCOLAX) Packet     Sig: Take 1 packet by mouth daily     Acetaminophen (TYLENOL PO)     Sig: Take 1,000 mg by mouth every 6 hours         REVIEW OF SYSTEMS:  10 point ROS of systems including Constitutional, Eyes, Respiratory, Cardiovascular, Gastroenterology, Genitourinary, Integumentary, Musculoskeletal, Psychiatric were all negative except for pertinent positives noted in my HPI.    Physical Exam:  /60  Pulse 77  Temp 96.3  F (35.7  C)  Resp 14  Wt 171 lb 6.4 oz (77.7 kg)  SpO2 96%  BMI 23.25 kg/m2  GENERAL APPEARANCE:  Alert, in no distress  ENT:  Mouth and posterior oropharynx normal, moist mucous membranes, normal hearing acuity  EYES:  EOM, conjunctivae, lids, pupils and irises normal, PERRL  RESP:  respiratory effort and palpation of chest normal, lungs clear to auscultation , no respiratory distress  CV:  Palpation and auscultation of heart done , regular rate and rhythm, no murmur, rub, or gallop, peripheral edema trace+ in LLE  ABDOMEN:  normal bowel sounds, soft, nontender, no hepatosplenomegaly or other masses  M/S:   Examination of:   right upper extremity, left upper extremity and left lower extremity  Inspection, ROM, stability and muscle strength normal and generalized weakness,  BKA RLE  SKIN:  Inspection of skin and subcutaneous tissue baseline, did not visualize right stump incision  NEURO:   Cranial nerves 2-12 are normal tested and grossly at patient's baseline, speech WNL  PSYCH:  affect and mood normal    Recent Labs:   CBC RESULTS:    Recent Labs   Lab Test 10/25/18 10/22/18 10/18/18   10/14/18   0757   10/11/18   1657   WBC   4.2   --    --   6.5   --   9.1   RBC   2.94*   --    --   2.73*   --   2.62*   HGB 8.7  8.5*  8.0*   < >  8.2*   < >  7.8*   HCT   27.0*   --    --   24.2*   --   23.5*   MCV   91.8   --    --   89   --   90   MCH    --    --    --   30.0   --   29.8   MCHC    --    --    --   33.9   --   33.2   RDW   16.5*   --    --   15.9*   --   16.1*   PLT   188   --    --   130*   --   152     < > = values in this interval not displayed.       Last Basic Metabolic Panel:  Lab Test 10/25/18 10/22/18 10/18/18  10/12/18   0802    138  135*  137   POTASSIUM 3.9 3.8  3.9  4.1   CHLORIDE 106 107  103  103   JUSTINO 9.0 8.8  8.9  8.4*   CO2 24 20  23  28   BUN 17 16  19  19   CR 0.87 0.99  1.04  1.17    155  149*  112*       Liver Function Studies -   Recent Labs   Lab Test  10/03/18   0636  09/22/18   0042   PROTTOTAL  5.5*  8.0   ALBUMIN  2.0*  4.1   BILITOTAL  0.6  0.9   ALKPHOS  73  126   AST  15  11   ALT  14  27       TSH   Date Value Ref Range Status   10/03/2018 1.11 0.40 - 4.00 mU/L Final       Lab Results   Component Value Date    A1C 9.0 09/22/2018    A1C 6.1 02/10/2017         INRS:  10/26: 1.9  10/24: 2.4  10/22: 2.6  10/19: 2.0  10/17: 1.3      Assessment/Plan:  Osteomyelitis Right Foot -- S/P BKA 10/11/18  Physical deconditioning  Acute s/p right BKA  PT and OT for strengthening, continue tylenol 1000mg q 6 hours while awake, oxycodone 5-10mg q 4 hours prn,      CAD S/P percutaneous coronary angioplasty  Chronic systolic heart failure (H)  Atrial fibrillation, unspecified type (H)  Chronic ongoing: daily weights, vitals daily and prn, BMP twice weekly,  continue lasix 40mg BID, metoprolol xl 100mg every day, coumadin as directed INR goal of 2-3,   lipitor 20mg at bedtime     Anemia due to blood loss, acute  Anemia due to stage 3 chronic kidney disease (H)  Acute/chronic: did receive 4 units of PRBC's during hospitalization: Hgb twice weekly while in TCU, transfuse if Hgb < 7.0 or patient is symptomatic  Ferrous sulfate 325mg QD     CKD (chronic kidney disease) stage 3, GFR 30-59 ml/min (H)  Chronic: baseline creat 1.4, BMP twice weekly during TCU stay, avoid nephrotoxic medications     Type 2 diabetes mellitus with stage 3 chronic kidney disease, unspecified whether long term insulin use (H)  Ongoing: blood sugar monitoring QID and prn, DC lantus and novolog before meals, continue sliding scale novolog insulin,   increase metformin to 750 mg BID, creat stable at this point will continue to monitor closely       PAD (peripheral artery disease) (H)  Ongoing: continue lipitor 20mg at bedtime, plavix 75mg every day,          Orders:  Increase metformin to 750mg BID  BMP twice weekly    Electronically signed by  Tonya Lynn Haase, APRN CNP                      Sincerely,        Tonya Lynn Haase, APRN CNP

## 2018-10-27 ENCOUNTER — NURSING HOME VISIT (OUTPATIENT)
Dept: GERIATRICS | Facility: CLINIC | Age: 78
End: 2018-10-27
Payer: MEDICARE

## 2018-10-27 DIAGNOSIS — N18.30 ANEMIA DUE TO STAGE 3 CHRONIC KIDNEY DISEASE (H): ICD-10-CM

## 2018-10-27 DIAGNOSIS — D63.1 ANEMIA DUE TO STAGE 3 CHRONIC KIDNEY DISEASE (H): ICD-10-CM

## 2018-10-27 DIAGNOSIS — M86.171 OSTEOMYELITIS OF FOOT, RIGHT, ACUTE (H): Primary | ICD-10-CM

## 2018-10-27 DIAGNOSIS — I48.91 ATRIAL FIBRILLATION, UNSPECIFIED TYPE (H): ICD-10-CM

## 2018-10-27 DIAGNOSIS — I50.22 CHRONIC SYSTOLIC HEART FAILURE (H): ICD-10-CM

## 2018-10-27 DIAGNOSIS — E11.22 TYPE 2 DIABETES MELLITUS WITH STAGE 3 CHRONIC KIDNEY DISEASE, UNSPECIFIED WHETHER LONG TERM INSULIN USE: ICD-10-CM

## 2018-10-27 DIAGNOSIS — I73.9 PAD (PERIPHERAL ARTERY DISEASE) (H): ICD-10-CM

## 2018-10-27 DIAGNOSIS — N18.3 TYPE 2 DIABETES MELLITUS WITH STAGE 3 CHRONIC KIDNEY DISEASE, UNSPECIFIED WHETHER LONG TERM INSULIN USE: ICD-10-CM

## 2018-10-27 PROCEDURE — 99305 1ST NF CARE MODERATE MDM 35: CPT | Performed by: INTERNAL MEDICINE

## 2018-10-29 ENCOUNTER — TRANSFERRED RECORDS (OUTPATIENT)
Dept: HEALTH INFORMATION MANAGEMENT | Facility: CLINIC | Age: 78
End: 2018-10-29

## 2018-10-29 LAB
BUN SERPL-MCNC: 20 MG/DL (ref 9–26)
CALCIUM SERPL-MCNC: 9.1 MG/DL (ref 8.4–10.4)
CHLORIDE SERPLBLD-SCNC: 106 MMOL/L (ref 98–109)
CO2 SERPL-SCNC: 22 MMOL/L (ref 22–31)
CREAT SERPL-MCNC: 0.96 MG/DL (ref 0.73–1.18)
GFR SERPL CREATININE-BSD FRML MDRD: >60 ML/MIN/1.73M2
GLUCOSE SERPL-MCNC: 182 MG/DL (ref 70–100)
HEMOGLOBIN: 9.4 G/DL (ref 13.4–17.5)
POTASSIUM SERPL-SCNC: 3.9 MMOL/L (ref 3.5–5.2)
SODIUM SERPL-SCNC: 138 MMOL/L (ref 136–145)

## 2018-10-29 NOTE — PROGRESS NOTES
[place] [facility]  PRIMARY CARE PROVIDER AND CLINIC:  Gene Guevara [facility] [address] [facility]*      Pt was seen by Dr Burrell on 10/27/18 for an initial TCU visit      HPI:    Tad Manning is a 77 year old  (1940), PMH HTN, CHF, atrial fib, PAD, CKD, DMII, GERD, who was hospitalized at [facility] from 9/30/18-10/16/18 for the management of R foot osteomyelitis with sepsis    Hospital course reviewed by me, is as per the hospital discharge summary and NP note    Pt underwent a Right BKA leg amputation with  wound vac placement on 10/8 and closure of Right BKA 10/11. Was discharged on augmentin    Course complicated by acute on chronic anemia, STORM on CKD,CHF (FVEF 35-45%)  Renal function at baseline at time of discharge      Admitted to this facility for  rehab, medical management and nursing care     Pt states he feels well. He has minimal pain R LE, is using a RW in therapy  He denies chest pain, SOB, or dizziness  BP and BG have been stable since admission to the TCU      CODE STATUS/ADVANCE DIRECTIVES DISCUSSION:   CPR/Full code   Patient's living condition: with roomate    ALLERGIES:Blood transfusion related (informational only)  PAST MEDICAL HISTORY:  has a past medical history of Arthritis; ASCVD (arteriosclerotic cardiovascular disease); BMI 30.0-30.9,adult; BPH (benign prostatic hypertrophy); Cellulitis; 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); Coronary artery disease; Diabetes mellitus (H); Diabetic polyneuropathy (H); History of blood transfusion; Hyperlipidaemia; Hypertension; Non-healing ulcer of foot (H); Noninflammatory pericardial effusion; Osteomyelitis of left foot (H); PVD (peripheral vascular disease) (H); and Sebaceous cyst. He also has no past medical history of COPD (chronic obstructive pulmonary disease) (H); Difficult intubation; Malignant hyperthermia; PONV (postoperative nausea and vomiting); Spinal  headache; or Thyroid disease.  PAST SURGICAL HISTORY:  has a past surgical history that includes tonsillectomy; Bone marrow biopsy, bone specimen, needle/trocar (10/19/2012); vascular surgery; Bypass graft femoropopliteal (1/10/2014); Amputate toe(s) (1/10/2014); Graft vein from extremity (location) (1/10/2014); Cardiac surgery; Excise Cyst Generic (Location) (N/A, 2/1/2016); Laminectomy thoracic one level (N/A, 2/8/2017); Optical tracking system fusion posterior spine thoracic three+ levels (N/A, 2/12/2017); Amputate leg below knee (Right, 10/8/2018); Apply Wound Vac (Right, 10/8/2018); and Amputate revision stump lower extremity (Right, 10/11/2018).  FAMILY HISTORY: family history includes Cancer in his mother.  SOCIAL HISTORY:  reports that he quit smoking about 23 years ago. His smoking use included Cigarettes. He has a 60.00 pack-year smoking history. He has never used smokeless tobacco. He reports that he does not drink alcohol or use illicit drugs.    Post Discharge Medication Reconciliation Status:   .  Current Outpatient Prescriptions   Medication Sig Dispense Refill     Acetaminophen (TYLENOL PO) Take 1,000 mg by mouth every 6 hours       atorvastatin (LIPITOR) 20 MG tablet TAKE 1 TABLET BY MOUTH EVERY DAY 90 tablet 3     bisacodyl (DULCOLAX) 10 MG Suppository Place 1 suppository (10 mg) rectally daily as needed for constipation 30 suppository      cholecalciferol 1000 UNITS TABS Take 1,000 Units by mouth daily 30 tablet      Citalopram Hydrobromide (CELEXA PO) Take 10 mg by mouth daily       clopidogrel (PLAVIX) 75 MG tablet Take 1 tablet (75 mg) by mouth daily 90 tablet 3     ferrous sulfate (IRON) 325 (65 Fe) MG tablet Take 1 tablet (325 mg) by mouth daily 100 tablet      Furosemide (LASIX PO) Take 40 mg by mouth daily        hydrocortisone (CORTIZONE-10) 1 % ointment Apply topically 2 times daily as needed for itching       insulin aspart (NOVOLOG FLEXPEN) 100 UNIT/ML injection Novolog Flexpen  Give  before meals and before bed:  For Pre-Meal Glucose:  140-189 give 1 unit   190-239 give 2 units   240-289 give 3 units   290-339 give 4 units   = or >340 give 5 units     For Bedtime Glucose  200 - 239 give 1 units   240 - 289 give 1.5 units  290 - 339 give 2 units  = or >340 give 2.5 units 15 mL 0     MELATONIN PO Take 5 mg by mouth daily       metFORMIN (GLUCOPHAGE) 500 MG tablet Take 1 tablet (500 mg) by mouth 2 times daily (with meals) 60 tablet      metoprolol succinate (TOPROL-XL) 200 MG 24 hr tablet Take 0.5 tablets (100 mg) by mouth daily 90 tablet 3     miconazole (MICATIN; MICRO GUARD) 2 % powder Apply topically 3 times daily       nitroglycerin (NITROSTAT) 0.4 MG sublingual tablet For chest pain place 1 tablet under the tongue every 5 minutes for 3 doses. If symptoms persist 5 minutes after 1st dose call 911. 25 tablet      order for DME Equipment being ordered: 2 shoe 1 Device 0     oxyCODONE IR (ROXICODONE) 5 MG tablet Take 1-2 tablets (5-10 mg) by mouth every 4 hours as needed for pain 20 tablet 0     pantoprazole (PROTONIX) 40 MG EC tablet Take 1 tablet (40 mg) by mouth daily 30 tablet      polyethylene glycol (MIRALAX/GLYCOLAX) Packet Take 1 packet by mouth daily       PROVIDER DOSED MANAGED WARFARIN, COUMADIN, OUTPATIENT Dose based on INR per Primary MD/Nursing Home MD.       sodium chloride (EQ SALINE NASAL SPRAY) 0.65 % nasal spray Spray 1 spray into both nostrils daily as needed for congestion 1 Bottle 1     warfarin (COUMADIN) 5 MG tablet Take 0.5 tablets tonight 9/24 and 1 tab tomorrow night 9/25. INR check on Wednesday 9/26 and continue as directed by coumadin clinic. 45 tablet 1       ROS:  10 point ROS neg except as noted above.    Exam:    GENERAL APPEARANCE:  Alert, in no distress, pale appearing, very pleasant  ENT:  Mouth and posterior oropharynx normal, moist mucous membranes  EYES:  EOM, conjunctivae, lids nl  RESP:  RR 12, lungs clear  CV RRR  ABDOMEN: soft, non-tender  M/S:  R LE  in soft brace  No edema L LE  NEURO:   Cranial nerves 2-12 intact.  PSYCH:  affect and mood normal    Lab/Diagnostic data:  CBC RESULTS:   Recent Labs   Lab Test  10/15/18   0819  10/14/18   0757   10/11/18   1657   WBC   --   6.5   --   9.1   RBC   --   2.73*   --   2.62*   HGB  8.1*  8.2*   < >  7.8*   HCT   --   24.2*   --   23.5*   MCV   --   89   --   90   MCH   --   30.0   --   29.8   MCHC   --   33.9   --   33.2   RDW   --   15.9*   --   16.1*   PLT   --   130*   --   152    < > = values in this interval not displayed.       Last Basic Metabolic Panel:  Recent Labs   Lab Test  10/12/18   0802  10/11/18   0821  10/08/18   0812   NA  137   --   136   POTASSIUM  4.1  4.1  3.5   CHLORIDE  103   --   103   JUSTINO  8.4*   --   8.7   CO2  28   --   26   BUN  19   --   19   CR  1.17   --   1.21   GLC  112*   --   171*       Liver Function Studies -   Recent Labs   Lab Test  10/03/18   0636  09/22/18   0042   PROTTOTAL  5.5*  8.0   ALBUMIN  2.0*  4.1   BILITOTAL  0.6  0.9   ALKPHOS  73  126   AST  15  11   ALT  14  27       TSH   Date Value Ref Range Status   10/03/2018 1.11 0.40 - 4.00 mU/L Final         ASSESSMENT/PLAN:  Osteomyelitis Right Foot -- S/P BKA 10/11/18  Pt is doing well  Pain controlled  Pt is progressing in therapy  Plan PT/OT.  Oxycodone and tylenol for pain. Surgery f/u      CAD S/P percutaneous coronary angioplasty  Chronic systolic heart failure (H)  Atrial fibrillation, unspecified type (H)  HR regular, rate controlled  CHF appears compensated  Plan continue current meds, monitor exam, wt, BMP  Chronic warfarin        Anemia due to blood loss, acute  Anemia due to stage 3 chronic kidney disease (H)  Stable since admission to TCU  Plan monitor Hgb, continue Fe for component of Fe deficiency    CKD (chronic kidney disease) stage 3, GFR 30-59 ml/min (H)  Stable  Plan monitor BMP      Type 2 diabetes mellitus with stage 3 chronic kidney disease, unspecified whether long term insulin use (H)  Plan monitor  BG, transition back to metformin as BG and renal function dictate        PAD (peripheral artery disease) (H)  Ongoing:   Continue lipitor 20mg at bedtime, plavix 75mg every day,        Norberto Burrell MD

## 2018-10-30 ENCOUNTER — TELEPHONE (OUTPATIENT)
Dept: OTHER | Facility: CLINIC | Age: 78
End: 2018-10-30

## 2018-10-30 NOTE — TELEPHONE ENCOUNTER
[facility].  Attempted to contact Stacy to discuss further.  Provided direct phone number, will await call back.  ZACK EliseN, RN

## 2018-11-01 ENCOUNTER — NURSING HOME VISIT (OUTPATIENT)
Dept: GERIATRICS | Facility: CLINIC | Age: 78
End: 2018-11-01
Payer: MEDICARE

## 2018-11-01 ENCOUNTER — TRANSFERRED RECORDS (OUTPATIENT)
Dept: HEALTH INFORMATION MANAGEMENT | Facility: CLINIC | Age: 78
End: 2018-11-01

## 2018-11-01 VITALS
HEART RATE: 77 BPM | RESPIRATION RATE: 16 BRPM | WEIGHT: 170.8 LBS | SYSTOLIC BLOOD PRESSURE: 123 MMHG | OXYGEN SATURATION: 98 % | TEMPERATURE: 97.1 F | BODY MASS INDEX: 23.16 KG/M2 | DIASTOLIC BLOOD PRESSURE: 64 MMHG

## 2018-11-01 DIAGNOSIS — I25.10 CAD S/P PERCUTANEOUS CORONARY ANGIOPLASTY: ICD-10-CM

## 2018-11-01 DIAGNOSIS — D63.1 ANEMIA DUE TO STAGE 3 CHRONIC KIDNEY DISEASE (H): ICD-10-CM

## 2018-11-01 DIAGNOSIS — Z98.61 CAD S/P PERCUTANEOUS CORONARY ANGIOPLASTY: ICD-10-CM

## 2018-11-01 DIAGNOSIS — N18.3 TYPE 2 DIABETES MELLITUS WITH STAGE 3 CHRONIC KIDNEY DISEASE, UNSPECIFIED WHETHER LONG TERM INSULIN USE: ICD-10-CM

## 2018-11-01 DIAGNOSIS — N18.30 CKD (CHRONIC KIDNEY DISEASE) STAGE 3, GFR 30-59 ML/MIN (H): ICD-10-CM

## 2018-11-01 DIAGNOSIS — R53.81 PHYSICAL DECONDITIONING: ICD-10-CM

## 2018-11-01 DIAGNOSIS — I50.22 CHRONIC SYSTOLIC HEART FAILURE (H): ICD-10-CM

## 2018-11-01 DIAGNOSIS — M86.171 OSTEOMYELITIS OF FOOT, RIGHT, ACUTE (H): Primary | ICD-10-CM

## 2018-11-01 DIAGNOSIS — I73.9 PAD (PERIPHERAL ARTERY DISEASE) (H): ICD-10-CM

## 2018-11-01 DIAGNOSIS — D62 ANEMIA DUE TO BLOOD LOSS, ACUTE: ICD-10-CM

## 2018-11-01 DIAGNOSIS — E11.22 TYPE 2 DIABETES MELLITUS WITH STAGE 3 CHRONIC KIDNEY DISEASE, UNSPECIFIED WHETHER LONG TERM INSULIN USE: ICD-10-CM

## 2018-11-01 DIAGNOSIS — N18.30 ANEMIA DUE TO STAGE 3 CHRONIC KIDNEY DISEASE (H): ICD-10-CM

## 2018-11-01 DIAGNOSIS — I48.91 ATRIAL FIBRILLATION, UNSPECIFIED TYPE (H): ICD-10-CM

## 2018-11-01 LAB
BUN SERPL-MCNC: 23 MG/DL (ref 9–26)
CALCIUM SERPL-MCNC: 9.3 MG/DL (ref 8.4–10.4)
CHLORIDE SERPLBLD-SCNC: 106 MMOL/L (ref 98–109)
CO2 SERPL-SCNC: 22 MMOL/L (ref 22–31)
CREAT SERPL-MCNC: 0.96 MG/DL (ref 0.73–1.18)
GFR SERPL CREATININE-BSD FRML MDRD: >60 ML/MIN/1.73M2
GLUCOSE SERPL-MCNC: 155 MG/DL (ref 70–100)
HEMOGLOBIN: 10.1 G/DL (ref 13.4–17.5)
POTASSIUM SERPL-SCNC: 3.8 MMOL/L (ref 3.5–5.2)
SODIUM SERPL-SCNC: 138 MMOL/L (ref 136–145)

## 2018-11-01 PROCEDURE — 99309 SBSQ NF CARE MODERATE MDM 30: CPT | Performed by: NURSE PRACTITIONER

## 2018-11-01 NOTE — LETTER
11/1/2018        RE: Tad Manning  [address] [address] [address]        [facility]    Chief Complaint   Patient presents with     RECHECK       [facility] Record Number:  [id number]  Place of Service where encounter took place:  [facility] ([facility]) [[id number]]    HPI:    Tad Manning is a 77 year old  (1940), who is being seen today for an episodic care visit.  HPI information obtained from: facility chart records, facility staff, patient report and [facility] chart reviewToday's concern is:  Osteomyelitis s/p right BKA: patient states pain in right stump/leg is well controlled, in therapy he is doing some hopping using a RW up to 27 feet with CTG assist, will do a home eval in hopes of going home soon  Anemia : see labs  CKD: see labs  CHF/atrial fib: Last 3 BPs: 123/64, 126/60, 122/60 mmHg  HR Ranges: 64-77 bpm patient denies CP, palpitations, SOB, cough  Admission Weight: 10/16/18: 176.6 lbs  Current Weights: 10/21/18: 173.1 lbs - 10/24/18: 171.4 lbs - 11/1/18: 170.8 lbs  BG  DMII: AM: 152-186 mg/dL  NOON: 151-313 mg/dL  PM: 111-277 mg/dL    ALLERGIES: Blood transfusion related (informational only)  Past Medical, Surgical, Family and Social History reviewed and updated in [facility].    Current Outpatient Prescriptions   Medication Sig Dispense Refill     Acetaminophen (TYLENOL PO) Take 1,000 mg by mouth every 6 hours       atorvastatin (LIPITOR) 20 MG tablet TAKE 1 TABLET BY MOUTH EVERY DAY 90 tablet 3     bisacodyl (DULCOLAX) 10 MG Suppository Place 1 suppository (10 mg) rectally daily as needed for constipation 30 suppository      cholecalciferol 1000 UNITS TABS Take 1,000 Units by mouth daily 30 tablet      Citalopram Hydrobromide (CELEXA PO) Take 10 mg by mouth daily       clopidogrel (PLAVIX) 75 MG tablet Take 1 tablet (75 mg) by mouth daily 90 tablet 3     ferrous sulfate (IRON) 325 (65 Fe) MG tablet Take 1 tablet (325 mg) by mouth  daily 100 tablet      Furosemide (LASIX PO) Take 40 mg by mouth daily        hydrocortisone (CORTIZONE-10) 1 % ointment Apply topically 2 times daily as needed for itching       insulin aspart (NOVOLOG FLEXPEN) 100 UNIT/ML injection Novolog Flexpen  Give before meals and before bed:  For Pre-Meal Glucose:  140-189 give 1 unit   190-239 give 2 units   240-289 give 3 units   290-339 give 4 units   = or >340 give 5 units     For Bedtime Glucose  200 - 239 give 1 units   240 - 289 give 1.5 units  290 - 339 give 2 units  = or >340 give 2.5 units 15 mL 0     MELATONIN PO Take 5 mg by mouth daily       metFORMIN (GLUCOPHAGE) 500 MG tablet Take 1 tablet (500 mg) by mouth 2 times daily (with meals) 60 tablet      metoprolol succinate (TOPROL-XL) 200 MG 24 hr tablet Take 0.5 tablets (100 mg) by mouth daily 90 tablet 3     miconazole (MICATIN; MICRO GUARD) 2 % powder Apply topically 3 times daily       nitroglycerin (NITROSTAT) 0.4 MG sublingual tablet For chest pain place 1 tablet under the tongue every 5 minutes for 3 doses. If symptoms persist 5 minutes after 1st dose call 911. 22 tablet      order for DME Equipment being ordered: DH2 shoe 1 Device 0     oxyCODONE IR (ROXICODONE) 5 MG tablet Take 1-2 tablets (5-10 mg) by mouth every 4 hours as needed for pain 20 tablet 0     pantoprazole (PROTONIX) 40 MG EC tablet Take 1 tablet (40 mg) by mouth daily 30 tablet      polyethylene glycol (MIRALAX/GLYCOLAX) Packet Take 1 packet by mouth daily       PROVIDER DOSED MANAGED WARFARIN, COUMADIN, OUTPATIENT Dose based on INR per Primary MD/Nursing Home MD.       sodium chloride (EQ SALINE NASAL SPRAY) 0.65 % nasal spray Spray 1 spray into both nostrils daily as needed for congestion 1 Bottle 1     Warfarin Therapy Reminder 1 each continuous prn       Medications reviewed:  Medications reconciled to facility chart and changes were made to reflect current medications as identified as above med list. Below are the changes that were  made:   Medications stopped since last EPIC medication reconciliation:   Medications Discontinued During This Encounter   Medication Reason     warfarin (COUMADIN) 5 MG tablet Therapy completed       Medications started since last [facility] medication reconciliation:  Orders Placed This Encounter   Medications     Warfarin Therapy Reminder     Si each continuous prn         REVIEW OF SYSTEMS:  10 point ROS of systems including Constitutional, Eyes, Respiratory, Cardiovascular, Gastroenterology, Genitourinary, Integumentary, Musculoskeletal, Psychiatric were all negative except for pertinent positives noted in my HPI.    Physical Exam:  /64  Pulse 77  Temp 97.1  F (36.2  C)  Resp 16  Wt 170 lb 12.8 oz (77.5 kg)  SpO2 98%  BMI 23.16 kg/m2  GENERAL APPEARANCE:  Alert, in no distress  ENT:  Mouth and posterior oropharynx normal, moist mucous membranes, normal hearing acuity  EYES:  EOM, conjunctivae, lids, pupils and irises normal, PERRL  RESP:  respiratory effort and palpation of chest normal, lungs clear to auscultation , no respiratory distress  CV:  Palpation and auscultation of heart done , regular rate and rhythm, no murmur, rub, or gallop, peripheral edema trace+ in LLE  ABDOMEN:  normal bowel sounds, soft, nontender, no hepatosplenomegaly or other masses  M/S:   Examination of:   right upper extremity, left upper extremity and left lower extremity  Inspection, ROM, stability and muscle strength normal and generalized weakness, BKA RLE  SKIN:  Inspection of skin and subcutaneous tissue baseline, did not visualize right stump incision  NEURO:   Cranial nerves 2-12 are normal tested and grossly at patient's baseline, speech WNL  PSYCH:  affect and mood normal    Recent Labs:   CBC RESULTS:   Lab Test 10/29/18 10/25/18 10/22/18   10/14/18   0757   10/11/18   1657   WBC   --    4.2   --   6.5   --   9.1   RBC   --    2.94*   --   2.73*   --   2.62*   HGB  9.4* 8.7  8.5*   < >  8.2*   < >  7.8*   HCT   --     27.0*   --   24.2*   --   23.5*   MCV   --    91.8   --   89   --   90   MCH   --     --    --   30.0   --   29.8   MCHC   --     --    --   33.9   --   33.2   RDW   --    16.5*   --   15.9*   --   16.1*   PLT   --    188   --   130*   --   152       Last Basic Metabolic Panel:  Lab Test 10/29/18 10/25/18 10/18/18   NA  138 138  135*   POTASSIUM  3.9 3.9  3.9   CHLORIDE  106 106  103   JUSTINO  9.1 9.0  8.9   CO2  22 24  23   BUN  20 17  19   CR  0.96 0.87  1.04   GLC  182* 155  149*       Liver Function Studies -   Recent Labs   Lab Test  10/03/18   0636  09/22/18   0042   PROTTOTAL  5.5*  8.0   ALBUMIN  2.0*  4.1   BILITOTAL  0.6  0.9   ALKPHOS  73  126   AST  15  11   ALT  14  27       TSH   Date Value Ref Range Status   10/03/2018 1.11 0.40 - 4.00 mU/L Final       Lab Results   Component Value Date    A1C 9.0 09/22/2018    A1C 6.1 02/10/2017           INRS:  10/31: 2.0  10/29: 1.8  10/26: 1.9  10/24: 2.4  10/22: 2.6  10/19: 2.0  10/17: 1.3      Assessment/Plan:  Osteomyelitis Right Foot -- S/P BKA 10/11/18  Physical deconditioning  Acute s/p right BKA  PT and OT for strengthening, continue tylenol 1000mg q 6 hours while awake, oxycodone 5-10mg q 4 hours prn,      CAD S/P percutaneous coronary angioplasty  Chronic systolic heart failure (H)  Atrial fibrillation, unspecified type (H)  Chronic ongoing: daily weights, vitals daily and prn, BMP twice weekly, continue lasix 40mg BID, metoprolol xl 100mg every day, coumadin as directed INR goal of 2-3,   lipitor 20mg at bedtime     Anemia due to blood loss, acute  Anemia due to stage 3 chronic kidney disease (H)  Acute/chronic: did receive 4 units of PRBC's during hospitalization: decrease Hgb to weekly, transfuse if Hgb < 7.0 or patient is symptomatic  Ferrous sulfate 325mg QD     CKD (chronic kidney disease) stage 3, GFR 30-59 ml/min (H)  Chronic: baseline creat 1.4, creat stable here in TCU,  decrease BMP to weekly, avoid nephrotoxic medications     Type 2 diabetes  mellitus with stage 3 chronic kidney disease, unspecified whether long term insulin use (H)  Ongoing: blood sugar monitoring QID and prn, DC sliding scale insulin, call if BS < 60 or > 350,  increase metformin to 750 mg BID, creat stable at this point will continue to monitor closely       PAD (peripheral artery disease) (H)  Ongoing: continue lipitor 20mg at bedtime, plavix 75mg every day,          Orders:  BMP and Hgb weekly on monday  Increase metformin to 750mg BID  Blood sugar monitoring QID call if BS < 60 or > 350  DC sliding scale insulin    Electronically signed by  Tonya Lynn Haase, APRN CNP                    Sincerely,        Tonya Lynn Haase, APRN CNP

## 2018-11-01 NOTE — PROGRESS NOTES
[facility]    Chief Complaint   Patient presents with     MARK       [place] [facility] Record Number:  [id number]  Place of Service where encounter took place:  [facility] ([facility]) [[id number]]    HPI:    Tad Manning is a 77 year old  (1940), who is being seen today for an episodic care visit.  HPI information obtained from: facility chart records, facility staff, patient report and [facility] chart reviewToday's concern is:  Osteomyelitis s/p right BKA: patient states pain in right stump/leg is well controlled, in therapy he is doing some hopping using a RW up to 27 feet with CTG assist, will do a home eval in hopes of going home soon  Anemia : see labs  CKD: see labs  CHF/atrial fib: Last 3 BPs: 123/64, 126/60, 122/60 mmHg  HR Ranges: 64-77 bpm patient denies CP, palpitations, SOB, cough  Admission Weight: 10/16/18: 176.6 lbs  Current Weights: 10/21/18: 173.1 lbs - 10/24/18: 171.4 lbs - 11/1/18: 170.8 lbs  BG  DMII: AM: 152-186 mg/dL  NOON: 151-313 mg/dL  PM: 111-277 mg/dL    ALLERGIES: Blood transfusion related (informational only)  Past Medical, Surgical, Family and Social History reviewed and updated in [facility].    Current Outpatient Prescriptions   Medication Sig Dispense Refill     Acetaminophen (TYLENOL PO) Take 1,000 mg by mouth every 6 hours       atorvastatin (LIPITOR) 20 MG tablet TAKE 1 TABLET BY MOUTH EVERY DAY 90 tablet 3     bisacodyl (DULCOLAX) 10 MG Suppository Place 1 suppository (10 mg) rectally daily as needed for constipation 30 suppository      cholecalciferol 1000 UNITS TABS Take 1,000 Units by mouth daily 30 tablet      Citalopram Hydrobromide (CELEXA PO) Take 10 mg by mouth daily       clopidogrel (PLAVIX) 75 MG tablet Take 1 tablet (75 mg) by mouth daily 90 tablet 3     ferrous sulfate (IRON) 325 (65 Fe) MG tablet Take 1 tablet (325 mg) by mouth daily 100 tablet      Furosemide (LASIX PO) Take 40 mg by mouth daily        hydrocortisone (CORTIZONE-10) 1  % ointment Apply topically 2 times daily as needed for itching       insulin aspart (NOVOLOG FLEXPEN) 100 UNIT/ML injection Novolog Flexpen  Give before meals and before bed:  For Pre-Meal Glucose:  140-189 give 1 unit   190-239 give 2 units   240-289 give 3 units   290-339 give 4 units   = or >340 give 5 units     For Bedtime Glucose  200 - 239 give 1 units   240 - 289 give 1.5 units  290 - 339 give 2 units  = or >340 give 2.5 units 15 mL 0     MELATONIN PO Take 5 mg by mouth daily       metFORMIN (GLUCOPHAGE) 500 MG tablet Take 1 tablet (500 mg) by mouth 2 times daily (with meals) 60 tablet      metoprolol succinate (TOPROL-XL) 200 MG 24 hr tablet Take 0.5 tablets (100 mg) by mouth daily 90 tablet 3     miconazole (MICATIN; MICRO GUARD) 2 % powder Apply topically 3 times daily       nitroglycerin (NITROSTAT) 0.4 MG sublingual tablet For chest pain place 1 tablet under the tongue every 5 minutes for 3 doses. If symptoms persist 5 minutes after 1st dose call 911. 25 tablet      order for DME Equipment being ordered: DH2 shoe 1 Device 0     oxyCODONE IR (ROXICODONE) 5 MG tablet Take 1-2 tablets (5-10 mg) by mouth every 4 hours as needed for pain 20 tablet 0     pantoprazole (PROTONIX) 40 MG EC tablet Take 1 tablet (40 mg) by mouth daily 30 tablet      polyethylene glycol (MIRALAX/GLYCOLAX) Packet Take 1 packet by mouth daily       PROVIDER DOSED MANAGED WARFARIN, COUMADIN, OUTPATIENT Dose based on INR per Primary MD/Nursing Home MD.       sodium chloride (EQ SALINE NASAL SPRAY) 0.65 % nasal spray Spray 1 spray into both nostrils daily as needed for congestion 1 Bottle 1     Warfarin Therapy Reminder 1 each continuous prn       Medications reviewed:  Medications reconciled to facility chart and changes were made to reflect current medications as identified as above med list. Below are the changes that were made:   Medications stopped since last EPIC medication reconciliation:   Medications Discontinued During This  Encounter   Medication Reason     warfarin (COUMADIN) 5 MG tablet Therapy completed       Medications started since last [facility] medication reconciliation:  Orders Placed This Encounter   Medications     Warfarin Therapy Reminder     Si each continuous prn         REVIEW OF SYSTEMS:  10 point ROS of systems including Constitutional, Eyes, Respiratory, Cardiovascular, Gastroenterology, Genitourinary, Integumentary, Musculoskeletal, Psychiatric were all negative except for pertinent positives noted in my HPI.    Physical Exam:  /64  Pulse 77  Temp 97.1  F (36.2  C)  Resp 16  Wt 170 lb 12.8 oz (77.5 kg)  SpO2 98%  BMI 23.16 kg/m2  GENERAL APPEARANCE:  Alert, in no distress  ENT:  Mouth and posterior oropharynx normal, moist mucous membranes, normal hearing acuity  EYES:  EOM, conjunctivae, lids, pupils and irises normal, PERRL  RESP:  respiratory effort and palpation of chest normal, lungs clear to auscultation , no respiratory distress  CV:  Palpation and auscultation of heart done , regular rate and rhythm, no murmur, rub, or gallop, peripheral edema trace+ in LLE  ABDOMEN:  normal bowel sounds, soft, nontender, no hepatosplenomegaly or other masses  M/S:   Examination of:   right upper extremity, left upper extremity and left lower extremity  Inspection, ROM, stability and muscle strength normal and generalized weakness, BKA RLE  SKIN:  Inspection of skin and subcutaneous tissue baseline, did not visualize right stump incision  NEURO:   Cranial nerves 2-12 are normal tested and grossly at patient's baseline, speech WNL  PSYCH:  affect and mood normal    Recent Labs:   CBC RESULTS:   Lab Test 10/29/18 10/25/18 10/22/18   10/14/18   0757   10/11/18   1657   WBC   --    4.2   --   6.5   --   9.1   RBC   --    2.94*   --   2.73*   --   2.62*   HGB  9.4* 8.7  8.5*   < >  8.2*   < >  7.8*   HCT   --    27.0*   --   24.2*   --   23.5*   MCV   --    91.8   --   89   --   90   MCH   --     --    --   30.0   --    29.8   MCHC   --     --    --   33.9   --   33.2   RDW   --    16.5*   --   15.9*   --   16.1*   PLT   --    188   --   130*   --   152       Last Basic Metabolic Panel:  Lab Test 10/29/18 10/25/18 10/18/18   NA  138 138  135*   POTASSIUM  3.9 3.9  3.9   CHLORIDE  106 106  103   JUSTINO  9.1 9.0  8.9   CO2  22 24  23   BUN  20 17  19   CR  0.96 0.87  1.04   GLC  182* 155  149*       Liver Function Studies -   Recent Labs   Lab Test  10/03/18   0636  09/22/18   0042   PROTTOTAL  5.5*  8.0   ALBUMIN  2.0*  4.1   BILITOTAL  0.6  0.9   ALKPHOS  73  126   AST  15  11   ALT  14  27       TSH   Date Value Ref Range Status   10/03/2018 1.11 0.40 - 4.00 mU/L Final       Lab Results   Component Value Date    A1C 9.0 09/22/2018    A1C 6.1 02/10/2017           INRS:  10/31: 2.0  10/29: 1.8  10/26: 1.9  10/24: 2.4  10/22: 2.6  10/19: 2.0  10/17: 1.3      Assessment/Plan:  Osteomyelitis Right Foot -- S/P BKA 10/11/18  Physical deconditioning  Acute s/p right BKA  PT and OT for strengthening, continue tylenol 1000mg q 6 hours while awake, oxycodone 5-10mg q 4 hours prn,      CAD S/P percutaneous coronary angioplasty  Chronic systolic heart failure (H)  Atrial fibrillation, unspecified type (H)  Chronic ongoing: daily weights, vitals daily and prn, BMP twice weekly, continue lasix 40mg BID, metoprolol xl 100mg every day, coumadin as directed INR goal of 2-3,   lipitor 20mg at bedtime     Anemia due to blood loss, acute  Anemia due to stage 3 chronic kidney disease (H)  Acute/chronic: did receive 4 units of PRBC's during hospitalization: decrease Hgb to weekly, transfuse if Hgb < 7.0 or patient is symptomatic  Ferrous sulfate 325mg QD     CKD (chronic kidney disease) stage 3, GFR 30-59 ml/min (H)  Chronic: baseline creat 1.4, creat stable here in TCU,  decrease BMP to weekly, avoid nephrotoxic medications     Type 2 diabetes mellitus with stage 3 chronic kidney disease, unspecified whether long term insulin use (H)  Ongoing: blood sugar  monitoring QID and prn, DC sliding scale insulin, call if BS < 60 or > 350,  increase metformin to 750 mg BID, creat stable at this point will continue to monitor closely       PAD (peripheral artery disease) (H)  Ongoing: continue lipitor 20mg at bedtime, plavix 75mg every day,          Orders:  BMP and Hgb weekly on monday  Increase metformin to 750mg BID  Blood sugar monitoring QID call if BS < 60 or > 350  DC sliding scale insulin    Electronically signed by  Tonya Lynn Haase, APRN CNP

## 2018-11-05 ENCOUNTER — TRANSFERRED RECORDS (OUTPATIENT)
Dept: HEALTH INFORMATION MANAGEMENT | Facility: CLINIC | Age: 78
End: 2018-11-05

## 2018-11-05 VITALS
OXYGEN SATURATION: 97 % | WEIGHT: 170.9 LBS | TEMPERATURE: 97 F | DIASTOLIC BLOOD PRESSURE: 58 MMHG | RESPIRATION RATE: 14 BRPM | BODY MASS INDEX: 23.18 KG/M2 | HEART RATE: 62 BPM | SYSTOLIC BLOOD PRESSURE: 144 MMHG

## 2018-11-05 LAB
BUN SERPL-MCNC: 19 MG/DL (ref 9–26)
CALCIUM SERPL-MCNC: 9 MG/DL (ref 8.4–10.4)
CHLORIDE SERPLBLD-SCNC: 107 MMOL/L (ref 98–109)
CO2 SERPL-SCNC: 22 MMOL/L (ref 22–31)
CREAT SERPL-MCNC: 1.04 MG/DL (ref 0.73–1.18)
GFR SERPL CREATININE-BSD FRML MDRD: >60 ML/MIN/1.73M2
GLUCOSE SERPL-MCNC: 176 MG/DL (ref 70–100)
HEMOGLOBIN: 9.6 G/DL (ref 13.4–17.5)
POTASSIUM SERPL-SCNC: 3.6 MMOL/L (ref 3.5–5.2)
SODIUM SERPL-SCNC: 137 MMOL/L (ref 136–145)

## 2018-11-06 ENCOUNTER — NURSING HOME VISIT (OUTPATIENT)
Dept: GERIATRICS | Facility: CLINIC | Age: 78
End: 2018-11-06
Payer: MEDICARE

## 2018-11-06 ENCOUNTER — TRANSFERRED RECORDS (OUTPATIENT)
Dept: HEALTH INFORMATION MANAGEMENT | Facility: CLINIC | Age: 78
End: 2018-11-06

## 2018-11-06 DIAGNOSIS — N18.30 ANEMIA DUE TO STAGE 3 CHRONIC KIDNEY DISEASE (H): ICD-10-CM

## 2018-11-06 DIAGNOSIS — I73.9 PAD (PERIPHERAL ARTERY DISEASE) (H): ICD-10-CM

## 2018-11-06 DIAGNOSIS — N18.3 TYPE 2 DIABETES MELLITUS WITH STAGE 3 CHRONIC KIDNEY DISEASE, UNSPECIFIED WHETHER LONG TERM INSULIN USE: ICD-10-CM

## 2018-11-06 DIAGNOSIS — M86.171 OSTEOMYELITIS OF FOOT, RIGHT, ACUTE (H): Primary | ICD-10-CM

## 2018-11-06 DIAGNOSIS — I25.10 CAD S/P PERCUTANEOUS CORONARY ANGIOPLASTY: ICD-10-CM

## 2018-11-06 DIAGNOSIS — I50.22 CHRONIC SYSTOLIC HEART FAILURE (H): ICD-10-CM

## 2018-11-06 DIAGNOSIS — I48.92 ATRIAL FLUTTER, UNSPECIFIED TYPE (H): ICD-10-CM

## 2018-11-06 DIAGNOSIS — N18.30 CRF (CHRONIC RENAL FAILURE), STAGE 3 (MODERATE) (H): ICD-10-CM

## 2018-11-06 DIAGNOSIS — D62 ANEMIA DUE TO BLOOD LOSS, ACUTE: ICD-10-CM

## 2018-11-06 DIAGNOSIS — D63.1 ANEMIA DUE TO STAGE 3 CHRONIC KIDNEY DISEASE (H): ICD-10-CM

## 2018-11-06 DIAGNOSIS — Z98.61 CAD S/P PERCUTANEOUS CORONARY ANGIOPLASTY: ICD-10-CM

## 2018-11-06 DIAGNOSIS — E11.22 TYPE 2 DIABETES MELLITUS WITH STAGE 3 CHRONIC KIDNEY DISEASE, UNSPECIFIED WHETHER LONG TERM INSULIN USE: ICD-10-CM

## 2018-11-06 DIAGNOSIS — R53.81 PHYSICAL DECONDITIONING: ICD-10-CM

## 2018-11-06 LAB
BUN SERPL-MCNC: 20 MG/DL (ref 9–26)
CALCIUM SERPL-MCNC: 9.2 MG/DL (ref 8.4–10.4)
CHLORIDE SERPLBLD-SCNC: 107 MMOL/L (ref 98–109)
CO2 SERPL-SCNC: 22 MMOL/L (ref 22–31)
CREAT SERPL-MCNC: 0.92 MG/DL (ref 0.73–1.18)
GFR SERPL CREATININE-BSD FRML MDRD: >60 ML/MIN/1.73M2
GLUCOSE SERPL-MCNC: 175 MG/DL (ref 70–100)
HEMOGLOBIN: 10.1 G/DL (ref 13.4–17.5)
POTASSIUM SERPL-SCNC: 3.8 MMOL/L (ref 3.5–5.2)
SODIUM SERPL-SCNC: 138 MMOL/L (ref 136–145)

## 2018-11-06 PROCEDURE — 99316 NF DSCHRG MGMT 30 MIN+: CPT | Performed by: NURSE PRACTITIONER

## 2018-11-06 NOTE — LETTER
11/6/2018        RE: Tad Manning  [address]  [place] [facility] [address]          [facility] DISCHARGE SUMMARY    PATIENT'S NAME: Tad Manning  YOB: 1940  MEDICAL RECORD NUMBER:  [id number]  Place of Service where encounter took place:  [facility] [[id number]]    PRIMARY CARE PROVIDER AND CLINIC RESPONSIBLE AFTER TRANSFER: Gene Guevara [facility] [address] / [facility]    TRANSFERRING PROVIDERS: OFELIA Berman CNP, Dr. Ashanti MD  DATE OF SNF ADMISSION:  October / 16 / 2018  DATE OF SNF (anticipated) DISCHARGE: November / 10 / 2018  DISCHARGE DISPOSITION: [facility] Provider   RECENT HOSPITALIZATION/ED:  [facility] stay 9/30/18 to 10/16/18.     CODE STATUS/ADVANCE DIRECTIVES DISCUSSION:   CPR/Full code      Allergies   Allergen Reactions     Blood Transfusion Related (Informational Only) Other (See Comments)     Patient has a history of a clinically significant antibody against RBC antigens.  A delay in compatible RBCs may occur.      Condition on Discharge:  Improving.  Function:  WC mobility 400 feet with close supervision up/down facility ramp. Transferred in and out of 4 different chairs (WC<>chair, supervision) including recliner-style chair and one sofa to practice similar chairs to recliner at home. Pt demonstrates independent set-up and hand placement to transfer to stronger L side out of WC and safely back to R side when returning to chair. Also practiced navigating through doorways twice pulling door open and twice pushing door open with WC, Jessica with increased time to complete. Independent with ADLs  Cognitive Scores: BIMS 15/15 and Short blessed 02/28    Equipment: wheelchair    DISCHARGE DIAGNOSIS:   1. Osteomyelitis Right Foot -- S/P BKA 10/11/18    2. Physical deconditioning    3. CAD S/P percutaneous coronary angioplasty    4. Chronic systolic heart failure (H)    5.  Atrial flutter, unspecified type (H)    6. Anemia due to blood loss, acute    7. Anemia due to stage 3 chronic kidney disease (H)    8. CRF (chronic renal failure), stage 3 (moderate) (H)    9. Type 2 diabetes mellitus with stage 3 chronic kidney disease, unspecified whether long term insulin use (H)    10. PAD (peripheral artery disease) (H)        HPI Nursing Facility Course:  HPI information obtained from: facility chart records, facility staff, patient report and [facility] chart review.  Osteomyelitis Right Foot -- S/P BKA 10/11/18  Physical deconditioning  Acute s/p right BKA  PT and OT for strengthening completed in TCU - will discharge with ongoing PT in the home   Continues on regimen of tylenol 1000mg q 6 hours while awake, oxycodone 5-10mg q 4 hours prn  Stable on current regimen; continue medications as currently ordered.  F/u with PCP/Vascular for ongoing monitoring and management     CAD S/P percutaneous coronary angioplasty  Chronic systolic heart failure (H)  Atrial fibrillation, unspecified type (H)  Chronic ongoing: Continues on regimen of lasix 40mg BID, metoprolol xl 100mg every day and lipitor 20mg at bedtime, coumadin as directed INR goal of 2-3 while in TCU  INR pending for 11/07/2018  Stable on current regimen; continue medications as currently ordered.  F/u with PCP/Cardiology for ongoing monitoring and management     Anemia due to blood loss, acute  Anemia due to stage 3 chronic kidney disease (H)  Acute/chronic: did receive 4 units of PRBC's during hospitalization: On weekly Hgb checks in TCU - on regimen of Ferrous sulfate 325mg every day  Hemoglobin   Date Value Ref Range Status   11/06/2018 10.1 (A) 13.4 - 17.5 g/dL Final   11/05/2018 9.6 (A) 13.4 - 17.5 g/dL Final   Improving on current regimen; continue medications as currently ordered.  Ongoing monitoring per PCP at the time of discharge     CKD (chronic kidney disease) stage 3, GFR 30-59 ml/min (H)  Chronic: baseline creat 1.4,  creat stable in  - avoid nephrotoxic medications  Creatinine   Date Value Ref Range Status   11/06/2018 0.92 0.73 - 1.18 mg/dL Final   Stable without medical intervention  F/u with PCP for ongoing monitoring and management     Type 2 diabetes mellitus with stage 3 chronic kidney disease, unspecified whether long term insulin use (H)  Ongoing: blood sugar monitoring QID and prn in TCU. Metformin was increased to 750 mg BID, creat stable at this point would continue to monitor closely after discharge.  Lab Results   Component Value Date    A1C 9.0 09/22/2018    A1C 6.1 02/10/2017    A1C 7.0 01/21/2017    A1C 6.5 05/23/2014    A1C 7.3 01/10/2014   Continue medications as ordered  F/u with PCP for ongoing monitoring and management     PAD (peripheral artery disease) (H)  Ongoing: continues on  lipitor 20mg at bedtime, plavix 75mg every day  Recent Labs   Lab Test  04/09/18   0951  05/23/14   0745  01/10/14   0923   CHOL  65  134  92   HDL  30*  34*  28*   LDL  23  48  45   TRIG  58  257*  92   CHOLHDLRATIO   --   3.9  3.3   Stable on current regimen; continue medications as currently ordered.  F/u with PCP for ongoing monitoring and management    Last 4 BPs:  144/58  126/68  135/63  133/72    Last 4 HRs:  62  68  67  70    BG  AM: 162-207 mg/dL  NOON: 177-281 mg/dL  PM: 103-237 mg/dL    PAST MEDICAL HISTORY:  has a past medical history of Arthritis; ASCVD (arteriosclerotic cardiovascular disease); BMI 30.0-30.9,adult; BPH (benign prostatic hypertrophy); Cellulitis; 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); Coronary artery disease; Diabetes mellitus (H); Diabetic polyneuropathy (H); History of blood transfusion; Hyperlipidaemia; Hypertension; Non-healing ulcer of foot (H); Noninflammatory pericardial effusion; Osteomyelitis of left foot (H); PVD (peripheral vascular disease) (H); and Sebaceous cyst. He also has no past medical history of COPD (chronic obstructive pulmonary disease)  (H); Difficult intubation; Malignant hyperthermia; PONV (postoperative nausea and vomiting); Spinal headache; or Thyroid disease.    DISCHARGE MEDICATIONS:  Current Outpatient Prescriptions   Medication Sig Dispense Refill     Acetaminophen (TYLENOL PO) Take 1,000 mg by mouth every 6 hours       atorvastatin (LIPITOR) 20 MG tablet TAKE 1 TABLET BY MOUTH EVERY DAY 90 tablet 3     bisacodyl (DULCOLAX) 10 MG Suppository Place 1 suppository (10 mg) rectally daily as needed for constipation 30 suppository      cholecalciferol 1000 UNITS TABS Take 1,000 Units by mouth daily 30 tablet      Citalopram Hydrobromide (CELEXA PO) Take 10 mg by mouth daily       clopidogrel (PLAVIX) 75 MG tablet Take 1 tablet (75 mg) by mouth daily 90 tablet 3     ferrous sulfate (IRON) 325 (65 Fe) MG tablet Take 1 tablet (325 mg) by mouth daily 100 tablet      Furosemide (LASIX PO) Take 40 mg by mouth daily        hydrocortisone (CORTIZONE-10) 1 % ointment Apply topically 2 times daily as needed for itching       MELATONIN PO Take 5 mg by mouth daily       METFORMIN HCL PO Take 750 mg by mouth 2 times daily (with meals)       metoprolol succinate (TOPROL-XL) 200 MG 24 hr tablet Take 0.5 tablets (100 mg) by mouth daily 90 tablet 3     miconazole (MICATIN; MICRO GUARD) 2 % powder Apply topically 3 times daily       nitroglycerin (NITROSTAT) 0.4 MG sublingual tablet For chest pain place 1 tablet under the tongue every 5 minutes for 3 doses. If symptoms persist 5 minutes after 1st dose call 911. 25 tablet      order for DME Equipment being ordered: 2 shoe 1 Device 0     oxyCODONE IR (ROXICODONE) 5 MG tablet Take 1-2 tablets (5-10 mg) by mouth every 4 hours as needed for pain 20 tablet 0     pantoprazole (PROTONIX) 40 MG EC tablet Take 1 tablet (40 mg) by mouth daily 30 tablet      polyethylene glycol (MIRALAX/GLYCOLAX) Packet Take 1 packet by mouth daily as needed        PROVIDER DOSED MANAGED WARFARIN, COUMADIN, OUTPATIENT Dose based on INR per  Primary MD/Nursing Home MD.       sodium chloride (EQ SALINE NASAL SPRAY) 0.65 % nasal spray Spray 1 spray into both nostrils daily as needed for congestion 1 Bottle 1     Warfarin Therapy Reminder 1 each continuous prn         MEDICATION CHANGES/RATIONALE:     Coumadin dosed throughout stay based on INR    10/29/2018: Celexa 10mg qDay - Depression    10/30/2018: Miconazole 1% powder to groin TID - Derm Candidiasis    11/05/2018: Miralax changed to PRN from scheduled    11/06/2018: Bacatracin to L third toe at bedtime after foot soaks -- loss of toenail  Controlled medications sent with patient:   Script for Oxycodone 5mg tablet - 1-2 tabs PO q4h PRN medication for 20 tabs and 0 refills given to patient at dischage to have them fill at their out patient pharmacy     ROS:    10 point ROS of systems including Constitutional, Eyes, Respiratory, Cardiovascular, Gastroenterology, Genitourinary, Integumentary, Musculoskeletal, Psychiatric were all negative except for pertinent positives noted in my HPI.    Physical Exam:   Vitals: /58  Pulse 62  Temp 97  F (36.1  C)  Resp 14  Wt 170 lb 14.4 oz (77.5 kg)  SpO2 97%  BMI 23.18 kg/m2  BMI= Body mass index is 23.18 kg/(m^2).  GENERAL APPEARANCE:  Alert, in no distress, elderly male upright in W/c in room  ENT:  Mouth and posterior oropharynx normal, moist mucous membranes, normal hearing acuity  EYES:  EOM, conjunctivae, lids, pupils and irises normal, PERRL  RESP:  respiratory effort and palpation of chest normal, lungs clear to auscultation , no respiratory distress  CV:  Palpation and auscultation of heart done , regular rate and rhythm, no murmur, rub, or gallop, peripheral edema trace+ in LLE  ABDOMEN:  normal bowel sounds, soft, nontender, no hepatosplenomegaly or other masses  M/S:   Examination of right upper extremity, left upper extremity and left lower extremity  Inspection, ROM, stability and muscle strength normal and generalized weakness, BKA  RLE  SKIN:  Inspection of skin and subcutaneous tissue abnormal, did not visualize right stump incision; L 3rd tow (2nd toe previously amputated): Toenail completely gone from toenail bed - scabbed, dried blood present, no drainage noted on removed dressing, no s/sx of infection at this time  NEURO:   Cranial nerves 2-12 are normal tested and grossly at patient's baseline, speech WNL  PSYCH:  affect and mood normal    DISCHARGE PLAN:  Physical Therapy, Registered Nurse, Home Health Aide and From:  [employer] [facility]  Patient instructed to follow-up with:  PCP in 7 days      [facility] scheduled appointments:  Future Appointments  Date Time Provider Department Center   11/13/2018 1:15 PM Amador Vick MD [facility]       MTM referral needed and placed by this provider: No    Pending labs: INR on 11/07/2018  [facility] labs   CBC RESULTS:   Recent Labs   Lab Test 11/06/18 11/05/18  10/22/18   10/14/18   0757   10/11/18   1657   WBC   --    --    --   4.2   --   6.5   --   9.1   RBC   --    --    --   2.94*   --   2.73*   --   2.62*   HGB  10.1*  9.6*   < >  8.5*   < >  8.2*   < >  7.8*   HCT   --    --    --   27.0*   --   24.2*   --   23.5*   MCV   --    --    --   91.8   --   89   --   90   MCH   --    --    --    --    --   30.0   --   29.8   MCHC   --    --    --    --    --   33.9   --   33.2   RDW   --    --    --   16.5*   --   15.9*   --   16.1*   PLT   --    --    --   188   --   130*   --   152    < > = values in this interval not displayed.       Last Basic Metabolic Panel:  Recent Labs   Lab Test 11/06/18 11/05/18   NA  138  137   POTASSIUM  3.8  3.6   CHLORIDE  107  107   JUSTINO  9.2  9.0   CO2  22  22   BUN  20  19   CR  0.92  1.04   GLC  175*  176*       Liver Function Studies -   Recent Labs   Lab Test  10/03/18   0636  09/22/18   0042   PROTTOTAL  5.5*  8.0   ALBUMIN  2.0*  4.1   BILITOTAL  0.6  0.9   ALKPHOS  73  126   AST  15  11   ALT  14  27       TSH   Date Value Ref Range Status   10/03/2018  1.11 0.40 - 4.00 mU/L Final     Lab Results   Component Value Date    A1C 9.0 09/22/2018    A1C 6.1 02/10/2017           Discharge Treatments:     Wound care to L 3rd toenail bed: Soak L foot at bedtime and apply bacitracin to toenail bed at bedtime and cover with band aid (or alternate dressing)    TOTAL DISCHARGE TIME:   Greater than 30 minutes    Electronically signed by:  OFELIA Berman, DESHAWN  [facility]          Documentation of Face-to-Face and Certification for Home Health Services     Patient: Tad Manning   YOB: 1940  MR Number: [id number]  Today's Date: 11/7/2018    I certify that patient: Tad Manning is under my care and that I, or a nurse practitioner or physician's assistant working with me, had a face-to-face encounter that meets the physician face-to-face encounter requirements with this patient on: 11/6/2018.    This encounter with the patient was in whole, or in part, for the following medical condition, which is the primary reason for home health care: The primary encounter diagnosis was Osteomyelitis Right Foot -- S/P BKA 10/11/18. Diagnoses of Physical deconditioning, CAD S/P percutaneous coronary angioplasty, Chronic systolic heart failure (H), Atrial flutter, unspecified type (H), Anemia due to blood loss, acute, Anemia due to stage 3 chronic kidney disease (H), CRF (chronic renal failure), stage 3 (moderate) (H), Type 2 diabetes mellitus with stage 3 chronic kidney disease, unspecified whether long term insulin use (H), and PAD (peripheral artery disease) (H) were also pertinent to this visit.    I certify that, based on my findings, the following services are medically necessary home health services: Nursing, Physical Therapy and Home health aide.    My clinical findings support the need for the above services because: Nurse is needed: To provide assessment and oversight required in the home to assure adherence to the medical plan due  to: medical complexity and wound care.., Physical Therapy Services are needed to assess and treat the following functional impairments: physical deconditioning 2/2 above noted diagnoses and recent hospitalization. and Home heatlh aide to assist with ADLs as deemed necessary    Further, I certify that my clinical findings support that this patient is homebound (i.e. absences from home require considerable and taxing effort and are for medical reasons or [employer] services or infrequently or of short duration when for other reasons) because: Requires assistance of another person or specialized equipment to access medical services because patient: Is unable to exit home safely on own due to: physical limitations 2/2 new R BKA...    Based on the above findings. I certify that this patient is confined to the home and needs intermittent skilled nursing care, physical therapy and/or speech therapy.  The patient is under my care, and I have initiated the establishment of the plan of care.  This patient will be followed by a physician who will periodically review the plan of care.  Physician/Provider to provide follow up care: Gene Guevara    Responsible Medicare certified [facility] Physician: Dr. Jay Burrell MD, and only signing for initial order. Please send all follow up questions and concerns or needed follow up signatures to the PCP Gene Guevara.    Physician Signature: See electronic signature associated with these discharge orders.  Date: 11/7/2018        Sincerely,        OFELIA Berman CNP

## 2018-11-06 NOTE — PROGRESS NOTES
[place] [facility] DISCHARGE SUMMARY    PATIENT'S NAME: Tad Manning  YOB: 1940  MEDICAL RECORD NUMBER:  [id number]  Place of Service where encounter took place:  [facility] ([facility]) [[id number]]    PRIMARY CARE PROVIDER AND CLINIC RESPONSIBLE AFTER TRANSFER: Gene Guevara [facility] [address] / [place]    TRANSFERRING PROVIDERS: OFELIA Berman CNP, Dr. Ashanti MD  DATE OF SNF ADMISSION:  October / 16 / 2018  DATE OF SNF (anticipated) DISCHARGE: November / 10 / 2018  DISCHARGE DISPOSITION: [facility] Provider   RECENT HOSPITALIZATION/ED:  [facility] stay 9/30/18 to 10/16/18.     CODE STATUS/ADVANCE DIRECTIVES DISCUSSION:   CPR/Full code      Allergies   Allergen Reactions     Blood Transfusion Related (Informational Only) Other (See Comments)     Patient has a history of a clinically significant antibody against RBC antigens.  A delay in compatible RBCs may occur.      Condition on Discharge:  Improving.  Function:  WC mobility 400 feet with close supervision up/down facility ramp. Transferred in and out of 4 different chairs (WC<>chair, supervision) including recliner-style chair and one sofa to practice similar chairs to recliner at home. Pt demonstrates independent set-up and hand placement to transfer to stronger L side out of WC and safely back to R side when returning to chair. Also practiced navigating through doorways twice pulling door open and twice pushing door open with WC, Jessica with increased time to complete. Independent with ADLs  Cognitive Scores: BIMS 15/15 and Short blessed 02/28    Equipment: wheelchair    DISCHARGE DIAGNOSIS:   1. Osteomyelitis Right Foot -- S/P BKA 10/11/18    2. Physical deconditioning    3. CAD S/P percutaneous coronary angioplasty    4. Chronic systolic heart failure (H)    5. Atrial flutter, unspecified type (H)    6. Anemia due to blood loss, acute    7. Anemia due to stage 3  chronic kidney disease (H)    8. CRF (chronic renal failure), stage 3 (moderate) (H)    9. Type 2 diabetes mellitus with stage 3 chronic kidney disease, unspecified whether long term insulin use (H)    10. PAD (peripheral artery disease) (H)        HPI Nursing Facility Course:  HPI information obtained from: facility chart records, facility staff, patient report and [facility] chart review.  Osteomyelitis Right Foot -- S/P BKA 10/11/18  Physical deconditioning  Acute s/p right BKA  PT and OT for strengthening completed in TCU - will discharge with ongoing PT in the home   Continues on regimen of tylenol 1000mg q 6 hours while awake, oxycodone 5-10mg q 4 hours prn  Stable on current regimen; continue medications as currently ordered.  F/u with PCP/Vascular for ongoing monitoring and management     CAD S/P percutaneous coronary angioplasty  Chronic systolic heart failure (H)  Atrial fibrillation, unspecified type (H)  Chronic ongoing: Continues on regimen of lasix 40mg BID, metoprolol xl 100mg every day and lipitor 20mg at bedtime, coumadin as directed INR goal of 2-3 while in TCU  INR pending for 11/07/2018  Stable on current regimen; continue medications as currently ordered.  F/u with PCP/Cardiology for ongoing monitoring and management     Anemia due to blood loss, acute  Anemia due to stage 3 chronic kidney disease (H)  Acute/chronic: did receive 4 units of PRBC's during hospitalization: On weekly Hgb checks in TCU - on regimen of Ferrous sulfate 325mg every day  Hemoglobin   Date Value Ref Range Status   11/06/2018 10.1 (A) 13.4 - 17.5 g/dL Final   11/05/2018 9.6 (A) 13.4 - 17.5 g/dL Final   Improving on current regimen; continue medications as currently ordered.  Ongoing monitoring per PCP at the time of discharge     CKD (chronic kidney disease) stage 3, GFR 30-59 ml/min (H)  Chronic: baseline creat 1.4, creat stable in  - avoid nephrotoxic medications  Creatinine   Date Value Ref Range Status   11/06/2018  0.92 0.73 - 1.18 mg/dL Final   Stable without medical intervention  F/u with PCP for ongoing monitoring and management     Type 2 diabetes mellitus with stage 3 chronic kidney disease, unspecified whether long term insulin use (H)  Ongoing: blood sugar monitoring QID and prn in TCU. Metformin was increased to 750 mg BID, creat stable at this point would continue to monitor closely after discharge.  Lab Results   Component Value Date    A1C 9.0 09/22/2018    A1C 6.1 02/10/2017    A1C 7.0 01/21/2017    A1C 6.5 05/23/2014    A1C 7.3 01/10/2014   Continue medications as ordered  F/u with PCP for ongoing monitoring and management     PAD (peripheral artery disease) (H)  Ongoing: continues on  lipitor 20mg at bedtime, plavix 75mg every day  Recent Labs   Lab Test  04/09/18   0951  05/23/14   0745  01/10/14   0923   CHOL  65  134  92   HDL  30*  34*  28*   LDL  23  48  45   TRIG  58  257*  92   CHOLHDLRATIO   --   3.9  3.3   Stable on current regimen; continue medications as currently ordered.  F/u with PCP for ongoing monitoring and management    Last 4 BPs:  144/58  126/68  135/63  133/72    Last 4 HRs:  62  68  67  70    BG  AM: 162-207 mg/dL  NOON: 177-281 mg/dL  PM: 103-237 mg/dL    PAST MEDICAL HISTORY:  has a past medical history of Arthritis; ASCVD (arteriosclerotic cardiovascular disease); BMI 30.0-30.9,adult; BPH (benign prostatic hypertrophy); Cellulitis; 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); Coronary artery disease; Diabetes mellitus (H); Diabetic polyneuropathy (H); History of blood transfusion; Hyperlipidaemia; Hypertension; Non-healing ulcer of foot (H); Noninflammatory pericardial effusion; Osteomyelitis of left foot (H); PVD (peripheral vascular disease) (H); and Sebaceous cyst. He also has no past medical history of COPD (chronic obstructive pulmonary disease) (H); Difficult intubation; Malignant hyperthermia; PONV (postoperative nausea and vomiting); Spinal  headache; or Thyroid disease.    DISCHARGE MEDICATIONS:  Current Outpatient Prescriptions   Medication Sig Dispense Refill     Acetaminophen (TYLENOL PO) Take 1,000 mg by mouth every 6 hours       atorvastatin (LIPITOR) 20 MG tablet TAKE 1 TABLET BY MOUTH EVERY DAY 90 tablet 3     bisacodyl (DULCOLAX) 10 MG Suppository Place 1 suppository (10 mg) rectally daily as needed for constipation 30 suppository      cholecalciferol 1000 UNITS TABS Take 1,000 Units by mouth daily 30 tablet      Citalopram Hydrobromide (CELEXA PO) Take 10 mg by mouth daily       clopidogrel (PLAVIX) 75 MG tablet Take 1 tablet (75 mg) by mouth daily 90 tablet 3     ferrous sulfate (IRON) 325 (65 Fe) MG tablet Take 1 tablet (325 mg) by mouth daily 100 tablet      Furosemide (LASIX PO) Take 40 mg by mouth daily        hydrocortisone (CORTIZONE-10) 1 % ointment Apply topically 2 times daily as needed for itching       MELATONIN PO Take 5 mg by mouth daily       METFORMIN HCL PO Take 750 mg by mouth 2 times daily (with meals)       metoprolol succinate (TOPROL-XL) 200 MG 24 hr tablet Take 0.5 tablets (100 mg) by mouth daily 90 tablet 3     miconazole (MICATIN; MICRO GUARD) 2 % powder Apply topically 3 times daily       nitroglycerin (NITROSTAT) 0.4 MG sublingual tablet For chest pain place 1 tablet under the tongue every 5 minutes for 3 doses. If symptoms persist 5 minutes after 1st dose call 911. 25 tablet      order for DME Equipment being ordered: 2 shoe 1 Device 0     oxyCODONE IR (ROXICODONE) 5 MG tablet Take 1-2 tablets (5-10 mg) by mouth every 4 hours as needed for pain 20 tablet 0     pantoprazole (PROTONIX) 40 MG EC tablet Take 1 tablet (40 mg) by mouth daily 30 tablet      polyethylene glycol (MIRALAX/GLYCOLAX) Packet Take 1 packet by mouth daily as needed        PROVIDER DOSED MANAGED WARFARIN, COUMADIN, OUTPATIENT Dose based on INR per Primary MD/Nursing Home MD.       sodium chloride (EQ SALINE NASAL SPRAY) 0.65 % nasal spray Spray 1  spray into both nostrils daily as needed for congestion 1 Bottle 1     Warfarin Therapy Reminder 1 each continuous prn         MEDICATION CHANGES/RATIONALE:     Coumadin dosed throughout stay based on INR    10/29/2018: Celexa 10mg qDay - Depression    10/30/2018: Miconazole 1% powder to groin TID - Derm Candidiasis    11/05/2018: Miralax changed to PRN from scheduled    11/06/2018: Bacatracin to L third toe at bedtime after foot soaks -- loss of toenail  Controlled medications sent with patient:   Script for Oxycodone 5mg tablet - 1-2 tabs PO q4h PRN medication for 20 tabs and 0 refills given to patient at dischage to have them fill at their out patient pharmacy     ROS:    10 point ROS of systems including Constitutional, Eyes, Respiratory, Cardiovascular, Gastroenterology, Genitourinary, Integumentary, Musculoskeletal, Psychiatric were all negative except for pertinent positives noted in my HPI.    Physical Exam:   Vitals: /58  Pulse 62  Temp 97  F (36.1  C)  Resp 14  Wt 170 lb 14.4 oz (77.5 kg)  SpO2 97%  BMI 23.18 kg/m2  BMI= Body mass index is 23.18 kg/(m^2).  GENERAL APPEARANCE:  Alert, in no distress, elderly male upright in W/c in room  ENT:  Mouth and posterior oropharynx normal, moist mucous membranes, normal hearing acuity  EYES:  EOM, conjunctivae, lids, pupils and irises normal, PERRL  RESP:  respiratory effort and palpation of chest normal, lungs clear to auscultation , no respiratory distress  CV:  Palpation and auscultation of heart done , regular rate and rhythm, no murmur, rub, or gallop, peripheral edema trace+ in LLE  ABDOMEN:  normal bowel sounds, soft, nontender, no hepatosplenomegaly or other masses  M/S:   Examination of right upper extremity, left upper extremity and left lower extremity  Inspection, ROM, stability and muscle strength normal and generalized weakness, BKA RLE  SKIN:  Inspection of skin and subcutaneous tissue abnormal, did not visualize right stump incision; L  3rd tow (2nd toe previously amputated): Toenail completely gone from toenail bed - scabbed, dried blood present, no drainage noted on removed dressing, no s/sx of infection at this time  NEURO:   Cranial nerves 2-12 are normal tested and grossly at patient's baseline, speech WNL  PSYCH:  affect and mood normal    DISCHARGE PLAN:  Physical Therapy, Registered Nurse, Home Health Aide and From:  [facility]  Patient instructed to follow-up with:  PCP in 7 days      [facility] scheduled appointments:  Future Appointments  Date Time Provider Department Center   11/13/2018 1:15 PM Amador Vick MD [facility]       MTM referral needed and placed by this provider: No    Pending labs: INR on 11/07/2018  [facility] labs   CBC RESULTS:   Recent Labs   Lab Test 11/06/18 11/05/18  10/22/18   10/14/18   0757   10/11/18   1657   WBC   --    --    --   4.2   --   6.5   --   9.1   RBC   --    --    --   2.94*   --   2.73*   --   2.62*   HGB  10.1*  9.6*   < >  8.5*   < >  8.2*   < >  7.8*   HCT   --    --    --   27.0*   --   24.2*   --   23.5*   MCV   --    --    --   91.8   --   89   --   90   MCH   --    --    --    --    --   30.0   --   29.8   MCHC   --    --    --    --    --   33.9   --   33.2   RDW   --    --    --   16.5*   --   15.9*   --   16.1*   PLT   --    --    --   188   --   130*   --   152    < > = values in this interval not displayed.       Last Basic Metabolic Panel:  Recent Labs   Lab Test 11/06/18 11/05/18   NA  138  137   POTASSIUM  3.8  3.6   CHLORIDE  107  107   JUSTINO  9.2  9.0   CO2  22  22   BUN  20  19   CR  0.92  1.04   GLC  175*  176*       Liver Function Studies -   Recent Labs   Lab Test  10/03/18   0636  09/22/18   0042   PROTTOTAL  5.5*  8.0   ALBUMIN  2.0*  4.1   BILITOTAL  0.6  0.9   ALKPHOS  73  126   AST  15  11   ALT  14  27       TSH   Date Value Ref Range Status   10/03/2018 1.11 0.40 - 4.00 mU/L Final     Lab Results   Component Value Date    A1C 9.0 09/22/2018    A1C 6.1  02/10/2017           Discharge Treatments:     Wound care to L 3rd toenail bed: Soak L foot at bedtime and apply bacitracin to toenail bed at bedtime and cover with band aid (or alternate dressing)    TOTAL DISCHARGE TIME:   Greater than 30 minutes    Electronically signed by:  OFELIA Berman, DESHAWN  [facility]          Documentation of Face-to-Face and Certification for Home Health Services     Patient: Tad Manning   YOB: 1940  MR Number: [id number]  Today's Date: 11/7/2018    I certify that patient: Tad Manning is under my care and that I, or a nurse practitioner or physician's assistant working with me, had a face-to-face encounter that meets the physician face-to-face encounter requirements with this patient on: 11/6/2018.    This encounter with the patient was in whole, or in part, for the following medical condition, which is the primary reason for home health care: The primary encounter diagnosis was Osteomyelitis Right Foot -- S/P BKA 10/11/18. Diagnoses of Physical deconditioning, CAD S/P percutaneous coronary angioplasty, Chronic systolic heart failure (H), Atrial flutter, unspecified type (H), Anemia due to blood loss, acute, Anemia due to stage 3 chronic kidney disease (H), CRF (chronic renal failure), stage 3 (moderate) (H), Type 2 diabetes mellitus with stage 3 chronic kidney disease, unspecified whether long term insulin use (H), and PAD (peripheral artery disease) (H) were also pertinent to this visit.    I certify that, based on my findings, the following services are medically necessary home health services: Nursing, Physical Therapy and Home health aide.    My clinical findings support the need for the above services because: Nurse is needed: To provide assessment and oversight required in the home to assure adherence to the medical plan due to: medical complexity and wound care.., Physical Therapy Services are needed to assess and treat the  following functional impairments: physical deconditioning 2/2 above noted diagnoses and recent hospitalization. and Home heatlh aide to assist with ADLs as deemed necessary    Further, I certify that my clinical findings support that this patient is homebound (i.e. absences from home require considerable and taxing effort and are for medical reasons or [employer] services or infrequently or of short duration when for other reasons) because: Requires assistance of another person or specialized equipment to access medical services because patient: Is unable to exit home safely on own due to: physical limitations 2/2 new R BKA...    Based on the above findings. I certify that this patient is confined to the home and needs intermittent skilled nursing care, physical therapy and/or speech therapy.  The patient is under my care, and I have initiated the establishment of the plan of care.  This patient will be followed by a physician who will periodically review the plan of care.  Physician/Provider to provide follow up care: Gene Guevara    Responsible Medicare certified [place] Physician: Dr. Jay Burrell MD, and only signing for initial order. Please send all follow up questions and concerns or needed follow up signatures to the PCP Gene Guevara.    Physician Signature: See electronic signature associated with these discharge orders.  Date: 11/7/2018

## 2018-11-07 ENCOUNTER — NURSING HOME VISIT (OUTPATIENT)
Dept: GERIATRICS | Facility: CLINIC | Age: 78
End: 2018-11-07
Payer: MEDICARE

## 2018-11-07 VITALS
OXYGEN SATURATION: 97 % | HEART RATE: 62 BPM | WEIGHT: 171.1 LBS | TEMPERATURE: 97 F | SYSTOLIC BLOOD PRESSURE: 144 MMHG | BODY MASS INDEX: 23.21 KG/M2 | RESPIRATION RATE: 14 BRPM | DIASTOLIC BLOOD PRESSURE: 58 MMHG

## 2018-11-07 DIAGNOSIS — N18.30 CRF (CHRONIC RENAL FAILURE), STAGE 3 (MODERATE) (H): ICD-10-CM

## 2018-11-07 DIAGNOSIS — I25.10 CORONARY ARTERY DISEASE INVOLVING NATIVE CORONARY ARTERY OF NATIVE HEART WITHOUT ANGINA PECTORIS: ICD-10-CM

## 2018-11-07 DIAGNOSIS — E11.22 TYPE 2 DIABETES MELLITUS WITH STAGE 3 CHRONIC KIDNEY DISEASE, UNSPECIFIED WHETHER LONG TERM INSULIN USE: ICD-10-CM

## 2018-11-07 DIAGNOSIS — N18.3 TYPE 2 DIABETES MELLITUS WITH STAGE 3 CHRONIC KIDNEY DISEASE, UNSPECIFIED WHETHER LONG TERM INSULIN USE: ICD-10-CM

## 2018-11-07 DIAGNOSIS — Z74.09 IMPAIRED MOBILITY: Primary | ICD-10-CM

## 2018-11-07 DIAGNOSIS — M86.171 OSTEOMYELITIS OF FOOT, RIGHT, ACUTE (H): ICD-10-CM

## 2018-11-07 PROCEDURE — 99308 SBSQ NF CARE LOW MDM 20: CPT | Performed by: NURSE PRACTITIONER

## 2018-11-07 NOTE — PROGRESS NOTES
Face to Face and Medical Necessity Statement for DME Provider visit    Demographic Information on Tad Manning:  Gender: male  : 1940  [address] [place] [id number] [phone] (home) NONE (work)    Medical Record: [id number]  Social Security Number: [id number]  Primary Care Provider: Gene Guevara  Insurance: Payor: MEDICARE / Plan: MEDICARE / Product Type: Medicare /     HPI:   Tad Manning is a 78 year old  (1940), who is being seen today for a face to face provider visit at [facility]; medical necessity statement for DME included. This patient requires the following:  DME Ordered and Medical Necessity Statement   Mechanical Wheelchair  Size: standard 16 x 16  Corresponding cushion: Yes: 16 x 16 standard cushion  Standard foot rests: left standard foot rest  Elevating leg rests: right amputee leg rest, stump support right side   Arm rests: Yes: standard full length  Lap tray: No  Anti-tippers: YES  Elements or similar brand/style back rest insert  Dose patient use oxygen? No   Able to propel w/c? Yes  And is there someone who can?YES  Mobility related ADL that are affected in the home:  Patient unable to walk due to right BKA  The wheelchair is suitable and necessary for use in the patient's home.  Reason why a cane or walker will not meet the patient's needs: balance and safety due to right BKA  The patient has expressed willingness to use the wheelchair in the home and does have the physical and cognitive ability to maneuver the equipment or has a caregiver who is available, willing, and able to provide assistance in the home with the wheelchair.   Patients functional mobility deficit can be sufficiently resolved by the use of the above wheelchair    Patient requires wheelchair with right amputee leg rest and stump support due to right BKA. Needs anti tippers as is able to self propel and needs them t safely maneuver ramps and inclined sidewalks in the  community  Needs back rest insert due to needs 16x17 wc to fit into bathroom at home and due to narrowness of chair he has pain in the ribs and upper back as they press into the back rest bars.       Pt needing above DME with expected length of need of 99 months  due to medical necessity associated with following diagnosis:     Impaired mobility  Osteomyelitis of foot, right, acute (H)  Type 2 diabetes mellitus with stage 3 chronic kidney disease, unspecified whether long term insulin use (H)  CRF (chronic renal failure), stage 3 (moderate) (H)  Coronary artery disease involving native coronary artery of native heart without angina pectoris      PMH   has a past medical history of Arthritis; ASCVD (arteriosclerotic cardiovascular disease); BMI 30.0-30.9,adult; BPH (benign prostatic hypertrophy); Cellulitis; 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); Coronary artery disease; Diabetes mellitus (H); Diabetic polyneuropathy (H); History of blood transfusion; Hyperlipidaemia; Hypertension; Non-healing ulcer of foot (H); Noninflammatory pericardial effusion; Osteomyelitis of left foot (H); PVD (peripheral vascular disease) (H); and Sebaceous cyst. He also has no past medical history of COPD (chronic obstructive pulmonary disease) (H); Difficult intubation; Malignant hyperthermia; PONV (postoperative nausea and vomiting); Spinal headache; or Thyroid disease.    ROS:10 point ROS of systems including Constitutional, Eyes, Respiratory, Cardiovascular, Gastroenterology, Genitourinary, Integumentary, Musculoskeletal, Psychiatric were all negative except for pertinent positives noted in my HPI.    EXAM  Vitals: /58  Pulse 62  Temp 97  F (36.1  C)  Resp 14  Wt 171 lb 1.6 oz (77.6 kg)  SpO2 97%  BMI 23.21 kg/m2;BMI= Body mass index is 23.21 kg/(m^2).  GENERAL APPEARANCE:  Alert, in no distress, pleasant, cooperative, oriented x 4  EYES:  EOM, lids, pupils and irises normal, sclera  clear and conjunctiva normal, no discharge or mattering on lids or lashes noted  ENT:  Mouth normal, moist mucous membranes, nose normal without drainage or crusting, external ears without lesions, hearing acuity intact  NECK: supple, symmetrical  RESP:  respiratory effort and palpation of chest normal, no chest wall tenderness, no respiratory distress, Lung sounds clear, patient is on room air  CV:  Palpation and auscultation of heart done, rate and rhythm controlled and regular, no murmur, no rub or gallop. Edema none left lower leg. Right BKA.    M/S:   Gait and station wheelchair bound, no tenderness or swelling of the joints; able to move all extremities. Right BKA  NEURO: cranial nerves 2-12 grossly intact, no facial asymmetry, no speech deficits and able to follow directions, moves all extremities symmetrically  PSYCH:  insight and judgement intact, memory intact, affect and mood normal     ASSESSMENT/PLAN:  1. Impaired mobility    2. Osteomyelitis Right Foot -- S/P BKA 10/11/18    3. Type 2 diabetes mellitus with stage 3 chronic kidney disease, unspecified whether long term insulin use (H)    4. CRF (chronic renal failure), stage 3 (moderate) (H)    5. Coronary artery disease involving native coronary artery of native heart without angina pectoris        Orders:  1. Facility staff/TC to contact DME company to get their order form for provider to fill out    ELECTRONICALLY SIGNED BY ALESSIO CERTIFIED PROVIDER:  OFELIA Vance CNP   NPI [id number]   [facility]  [address] [place] [address], [address]  [place] [address]

## 2018-11-07 NOTE — MR AVS SNAPSHOT
After Visit Summary   11/7/2018    Tad Manning    MRN: [id number]           Patient Information     Date Of Birth          1940        Visit Information        Provider Department      11/7/2018 9:00 AM Esther Figueredo APRN CNP Geriatrics Transitional Care        Today's Diagnoses     Impaired mobility    -  1    Osteomyelitis Right Foot -- S/P BKA 10/11/18        Type 2 diabetes mellitus with stage 3 chronic kidney disease, unspecified whether long term insulin use (H)        CRF (chronic renal failure), stage 3 (moderate) (H)        Coronary artery disease involving native coronary artery of native heart without angina pectoris           Follow-ups after your visit        Your next 10 appointments already scheduled     Nov 13, 2018  1:15 PM CST   New Visit with Amador Vick MD   [facility] ([facility])    [address]  [facility] [id number] [phone]              Who to contact     If you have questions or need follow up information about today's clinic visit or your schedule please contact GERIATRICS TRANSITIONAL CARE directly at [phone].  Normal or non-critical lab and imaging results will be communicated to you by ServiceNowhart, letter or phone within 4 business days after the clinic has received the results. If you do not hear from us within 7 days, please contact the clinic through ServiceNowhart or phone. If you have a critical or abnormal lab result, we will notify you by phone as soon as possible.  Submit refill requests through Apptera or call your pharmacy and they will forward the refill request to us. Please allow 3 business days for your refill to be completed.          Additional Information About Your Visit        MyChart Information     Apptera gives you secure access to your electronic health record. If you see a primary care provider, you can also send messages to your care team  and make appointments. If you have questions, please call your primary care clinic.  If you do not have a primary care provider, please call [phone] and they will assist you.        Care EveryWhere ID     This is your Care EveryWhere ID. This could be used by other organizations to access your [facility] medical records  [id number]        Your Vitals Were     Pulse Temperature Respirations Pulse Oximetry BMI (Body Mass Index)       62 97  F (36.1  C) 14 97% 23.21 kg/m2        Blood Pressure from Last 3 Encounters:   11/07/18 144/58   11/05/18 144/58   11/01/18 123/64    Weight from Last 3 Encounters:   11/07/18 171 lb 1.6 oz (77.6 kg)   11/05/18 170 lb 14.4 oz (77.5 kg)   11/01/18 170 lb 12.8 oz (77.5 kg)              Today, you had the following     No orders found for display       Primary Care Provider Office Phone # Fax #    Gene Guevara [phone]       [place] [address]        Equal Access to Services     KULWINDER ELLISON : Hadii aad ku hadasho Soomaali, waaxda luqadaha, qaybta kaalmada adecarlosyada, deandre howard . So [facility] [phone].    ATENCIÓN: Si habla español, tiene a noe disposición servicios gratuitos de asistencia lingüística. [facility] [phone].    We comply with applicable federal civil rights laws and [place] laws. We do not discriminate on the basis of race, color, national origin, age, disability, sex, sexual orientation, or gender identity.            Thank you!     Thank you for choosing GERIATRICS TRANSITIONAL CARE  for your care. Our goal is always to provide you with excellent care. Hearing back from our patients is one way we can continue to improve our services. Please take a few minutes to complete the written survey that you may receive in the mail after your visit with us. Thank you!             Your Updated Medication List - Protect others around you: Learn how to safely use, store and  throw away your medicines at www.disposemymeds.org.          This list is accurate as of 11/7/18 12:05 PM.  Always use your most recent med list.                   Brand Name Dispense Instructions for use Diagnosis    atorvastatin 20 MG tablet    LIPITOR    90 tablet    TAKE 1 TABLET BY MOUTH EVERY DAY    Claudication of left lower extremity (H)       bisacodyl 10 MG Suppository    DULCOLAX    30 suppository    Place 1 suppository (10 mg) rectally daily as needed for constipation    Slow transit constipation       CELEXA PO      Take 10 mg by mouth daily        cholecalciferol 1000 units Tabs     30 tablet    Take 1,000 Units by mouth daily    Loop diuretic causing adverse effect in therapeutic use, subsequent encounter       clopidogrel 75 MG tablet    PLAVIX    90 tablet    Take 1 tablet (75 mg) by mouth daily    Status post percutaneous transluminal coronary angioplasty, Coronary artery disease involving native coronary artery of native heart without angina pectoris       CORTIZONE-10 1 % ointment   Generic drug:  hydrocortisone      Apply topically 2 times daily as needed for itching        ferrous sulfate 325 (65 Fe) MG tablet    IRON    100 tablet    Take 1 tablet (325 mg) by mouth daily    Anemia, unspecified type       LASIX PO      Take 40 mg by mouth daily        MELATONIN PO      Take 5 mg by mouth daily        METFORMIN HCL PO      Take 750 mg by mouth 2 times daily (with meals)        metoprolol succinate 200 MG 24 hr tablet    TOPROL-XL    90 tablet    Take 0.5 tablets (100 mg) by mouth daily    Chronic systolic heart failure (H)       miconazole 2 % powder    MICATIN; MICRO GUARD     Apply topically 3 times daily    Skin disease, fungal       nitroGLYcerin 0.4 MG sublingual tablet    NITROSTAT    25 tablet    For chest pain place 1 tablet under the tongue every 5 minutes for 3 doses. If symptoms persist 5 minutes after 1st dose call 911.    Atrial flutter, unspecified type (H)       order for DME      1 Device    Equipment being ordered: DH2 shoe    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), Diabetic ulcer of left foot due to type 2 diabetes mellitus (H)       oxyCODONE IR 5 MG tablet    ROXICODONE    20 tablet    Take 1-2 tablets (5-10 mg) by mouth every 4 hours as needed for pain    Osteomyelitis of foot, right, acute (H)       pantoprazole 40 MG EC tablet    PROTONIX    30 tablet    Take 1 tablet (40 mg) by mouth daily    Gastroesophageal reflux disease, esophagitis presence not specified       polyethylene glycol Packet    MIRALAX/GLYCOLAX     Take 1 packet by mouth daily as needed        PROVIDER DOSED MANAGED WARFARIN (COUMADIN) OUTPATIENT      Dose based on INR per Primary MD/Nursing Home MD.    Atrial fibrillation, unspecified type (H)       sodium chloride 0.65 % nasal spray    EQ SALINE NASAL SPRAY    1 Bottle    Spray 1 spray into both nostrils daily as needed for congestion        TYLENOL PO      Take 1,000 mg by mouth every 6 hours        Warfarin Therapy Reminder      1 each continuous prn

## 2018-11-07 NOTE — LETTER
2018        RE: Tad Manning  [address]  [place] [address]          Face to Face and Medical Necessity Statement for DME Provider visit    Demographic Information on Tad Manning:  Gender: male  : 1940  [address]   [place] [id number] [phone] (home) NONE (work)    Medical Record: [id number]  Social Security Number: [id number]  Primary Care Provider: Gene Guevara  Insurance: Payor: MEDICARE / Plan: MEDICARE / Product Type: Medicare /     HPI:   Tad Manning is a 78 year old  (1940), who is being seen today for a face to face provider visit at [facility]; medical necessity statement for DME included. This patient requires the following:  DME Ordered and Medical Necessity Statement   Mechanical Wheelchair  Size: standard 16 x 16  Corresponding cushion: Yes: 16 x 16 standard cushion  Standard foot rests: left standard foot rest  Elevating leg rests: right amputee leg rest, stump support right side   Arm rests: Yes: standard full length  Lap tray: No  Anti-tippers: YES  Elements or similar brand/style back rest insert  Dose patient use oxygen? No   Able to propel w/c? Yes  And is there someone who can?YES  Mobility related ADL that are affected in the home:  Patient unable to walk due to right BKA  The wheelchair is suitable and necessary for use in the patient's home.  Reason why a cane or walker will not meet the patient's needs: balance and safety due to right BKA  The patient has expressed willingness to use the wheelchair in the home and does have the physical and cognitive ability to maneuver the equipment or has a caregiver who is available, willing, and able to provide assistance in the home with the wheelchair.   Patients functional mobility deficit can be sufficiently resolved by the use of the above wheelchair    Patient requires wheelchair with right amputee leg rest and stump support due to right BKA. Needs  anti tippers as is able to self propel and needs them t safely maneuver ramps and inclined sidewalks in the community  Needs back rest insert due to needs 16x17 wc to fit into bathroom at home and due to narrowness of chair he has pain in the ribs and upper back as they press into the back rest bars.       Pt needing above DME with expected length of need of 99 months  due to medical necessity associated with following diagnosis:     Impaired mobility  Osteomyelitis of foot, right, acute (H)  Type 2 diabetes mellitus with stage 3 chronic kidney disease, unspecified whether long term insulin use (H)  CRF (chronic renal failure), stage 3 (moderate) (H)  Coronary artery disease involving native coronary artery of native heart without angina pectoris      PMH   has a past medical history of Arthritis; ASCVD (arteriosclerotic cardiovascular disease); BMI 30.0-30.9,adult; BPH (benign prostatic hypertrophy); Cellulitis; 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); Coronary artery disease; Diabetes mellitus (H); Diabetic polyneuropathy (H); History of blood transfusion; Hyperlipidaemia; Hypertension; Non-healing ulcer of foot (H); Noninflammatory pericardial effusion; Osteomyelitis of left foot (H); PVD (peripheral vascular disease) (H); and Sebaceous cyst. He also has no past medical history of COPD (chronic obstructive pulmonary disease) (H); Difficult intubation; Malignant hyperthermia; PONV (postoperative nausea and vomiting); Spinal headache; or Thyroid disease.    ROS:10 point ROS of systems including Constitutional, Eyes, Respiratory, Cardiovascular, Gastroenterology, Genitourinary, Integumentary, Musculoskeletal, Psychiatric were all negative except for pertinent positives noted in my HPI.    EXAM  Vitals: /58  Pulse 62  Temp 97  F (36.1  C)  Resp 14  Wt 171 lb 1.6 oz (77.6 kg)  SpO2 97%  BMI 23.21 kg/m2;BMI= Body mass index is 23.21 kg/(m^2).  GENERAL APPEARANCE:   Alert, in no distress, pleasant, cooperative, oriented x 4  EYES:  EOM, lids, pupils and irises normal, sclera clear and conjunctiva normal, no discharge or mattering on lids or lashes noted  ENT:  Mouth normal, moist mucous membranes, nose normal without drainage or crusting, external ears without lesions, hearing acuity intact  NECK: supple, symmetrical  RESP:  respiratory effort and palpation of chest normal, no chest wall tenderness, no respiratory distress, Lung sounds clear, patient is on room air  CV:  Palpation and auscultation of heart done, rate and rhythm controlled and regular, no murmur, no rub or gallop. Edema none left lower leg. Right BKA.    M/S:   Gait and station wheelchair bound, no tenderness or swelling of the joints; able to move all extremities. Right BKA  NEURO: cranial nerves 2-12 grossly intact, no facial asymmetry, no speech deficits and able to follow directions, moves all extremities symmetrically  PSYCH:  insight and judgement intact, memory intact, affect and mood normal     ASSESSMENT/PLAN:  1. Impaired mobility    2. Osteomyelitis Right Foot -- S/P BKA 10/11/18    3. Type 2 diabetes mellitus with stage 3 chronic kidney disease, unspecified whether long term insulin use (H)    4. CRF (chronic renal failure), stage 3 (moderate) (H)    5. Coronary artery disease involving native coronary artery of native heart without angina pectoris        Orders:  1. Facility staff/TC to contact DME company to get their order form for provider to fill out    ELECTRONICALLY SIGNED BY ALESSIO CERTIFIED PROVIDER:  OFELIA Vance CNP   NPI [id number]   [facility] [place] [facility]  [address] [place] [address], [address]  [place] [address]              Sincerely,        OFELIA Vance CNP

## 2018-11-08 ENCOUNTER — TRANSFERRED RECORDS (OUTPATIENT)
Dept: HEALTH INFORMATION MANAGEMENT | Facility: CLINIC | Age: 78
End: 2018-11-08

## 2018-11-08 LAB
BUN SERPL-MCNC: 21 MG/DL (ref 9–26)
CALCIUM SERPL-MCNC: 9 MG/DL (ref 8.4–10.4)
CHLORIDE SERPLBLD-SCNC: 105 MMOL/L (ref 98–109)
CO2 SERPL-SCNC: 22 MMOL/L (ref 22–31)
CREAT SERPL-MCNC: 0.93 MG/DL (ref 0.73–1.18)
GFR SERPL CREATININE-BSD FRML MDRD: >60 ML/MIN/1.73M2
GLUCOSE SERPL-MCNC: 170 MG/DL (ref 70–100)
HEMOGLOBIN: 9.9 G/DL (ref 13.4–17.5)
POTASSIUM SERPL-SCNC: 3.6 MMOL/L (ref 3.5–5.2)
SODIUM SERPL-SCNC: 137 MMOL/L (ref 136–145)

## 2018-11-13 ENCOUNTER — OFFICE VISIT (OUTPATIENT)
Dept: OTHER | Facility: CLINIC | Age: 78
End: 2018-11-13
Attending: SURGERY
Payer: MEDICARE

## 2018-11-13 VITALS — DIASTOLIC BLOOD PRESSURE: 63 MMHG | HEART RATE: 69 BPM | SYSTOLIC BLOOD PRESSURE: 129 MMHG

## 2018-11-13 DIAGNOSIS — Z89.511 HISTORY OF RIGHT BELOW KNEE AMPUTATION (H): Primary | ICD-10-CM

## 2018-11-13 DIAGNOSIS — Z95.828 HISTORY OF ARTERIAL BYPASS OF LOWER LIMB: ICD-10-CM

## 2018-11-13 PROCEDURE — 99024 POSTOP FOLLOW-UP VISIT: CPT | Mod: ZP | Performed by: SURGERY

## 2018-11-13 PROCEDURE — G0463 HOSPITAL OUTPT CLINIC VISIT: HCPCS

## 2018-11-13 NOTE — LETTER
[facility] [place] [address]  [place] [address]  Phone: [phone]  Fax: [phone]    2018    Re: aTd Manning, : 1940    Tad Manning returns today 1 month status post right below-knee amputation.  [facility]  Relative to the recent amputation he is without complaints.     Exam:  Right below knee amputation stump minimally edematous.  Excellent skin edge apposition. Mild central eschar.  No erythema.  All sutures are removed today replacing them with Steri-Strips.     IMPRESSION:  1.  1 month status post right BKA overall doing well.  Sutures removed today.  Small central eschar of 2 or 3 cm not yet ready for weightbearing.     2.  History of left above-knee popliteal to below knee popliteal bypass with reverse greater saphenous vein in 2014.  That graft was last checked in .     RECOMMENDATION:  I reviewed all the above with Hima.  He will continue his medical regimen including Plavix for drug-eluting coronary stents and Coumadin for atrial fibrillation.  We will ask him to see [facility] to be evaluated for a stump .  He will continue to utilize his [facility] stump protector.  Vascular surgical follow-up will be with me in 4 weeks for a recheck of the right BKA and an ultrasound of the left leg bypass graft.     Solis Vick MD

## 2018-11-13 NOTE — NURSING NOTE
Tad Manning is a 78 year old male who presents for:  Chief Complaint   Patient presents with     Consult     1st PO - right lower leg amputation with wound vac on 10/11/18        Vitals:    Vitals:    11/13/18 1334   BP: 129/63   BP Location: Right arm   Patient Position: Sitting   Cuff Size: Adult Large   Pulse: 69       BMI:  Estimated body mass index is 23.21 kg/(m^2) as calculated from the following:    Height as of 9/30/18: 6' (1.829 m).    Weight as of 11/7/18: 171 lb 1.6 oz (77.6 kg).    Pain Score:  Data Unavailable        Deborah Torres

## 2018-11-13 NOTE — MR AVS SNAPSHOT
After Visit Summary   11/13/2018    Tad Manning    MRN: [id number]           Patient Information     Date Of Birth          1940        Visit Information        Provider Department      11/13/2018 1:15 PM Amador Vick MD [facility]       Follow-ups after your visit        Your next 10 appointments already scheduled     Dec 19, 2018 10:00 AM CST   Return Visit with Amador Vick MD   [facility] [place] ([facility])    [address]  [place] [id number] [phone]              Who to contact     If you have questions or need follow up information about today's clinic visit or your schedule please contact [facility] directly at [phone].  Normal or non-critical lab and imaging results will be communicated to you by MyChart, letter or phone within 4 business days after the clinic has received the results. If you do not hear from us within 7 days, please contact the clinic through MyChart or phone. If you have a critical or abnormal lab result, we will notify you by phone as soon as possible.  Submit refill requests through Quosis or call your pharmacy and they will forward the refill request to us. Please allow 3 business days for your refill to be completed.          Additional Information About Your Visit        MyChart Information     Quosis gives you secure access to your electronic health record. If you see a primary care provider, you can also send messages to your care team and make appointments. If you have questions, please call your primary care clinic.  If you do not have a primary care provider, please call [phone] and they will assist you.        Care EveryWhere ID     This is your Care EveryWhere ID. This could be used by other organizations to access your [facility]  records  [id number]        Your Vitals Were     Pulse                   69            Blood Pressure from Last 3 Encounters:   11/13/18 129/63   11/07/18 144/58   11/05/18 144/58    Weight from Last 3 Encounters:   11/07/18 171 lb 1.6 oz (77.6 kg)   11/05/18 170 lb 14.4 oz (77.5 kg)   11/01/18 170 lb 12.8 oz (77.5 kg)              Today, you had the following     No orders found for display       Primary Care Provider Office Phone # Fax #    Gene Guevara [phone]       [place] [address]        Equal Access to Services     STUART ELLISON : Hadii liana ochoao Sogin, waaxda luqadaha, qaybta kaalmada adecarlosyada, deandre del cid. So [facility] [phone].    ATENCIÓN: Si habla español, tiene a noe disposición servicios gratuitos de asistencia lingüística. [facility] [phone].    We comply with applicable federal civil rights laws and [place] laws. We do not discriminate on the basis of race, color, national origin, age, disability, sex, sexual orientation, or gender identity.            Thank you!     Thank you for choosing [facility]  for your care. Our goal is always to provide you with excellent care. Hearing back from our patients is one way we can continue to improve our services. Please take a few minutes to complete the written survey that you may receive in the mail after your visit with us. Thank you!             Your Updated Medication List - Protect others around you: Learn how to safely use, store and throw away your medicines at www.disposemymeds.org.          This list is accurate as of 11/13/18  2:05 PM.  Always use your most recent med list.                   Brand Name Dispense Instructions for use Diagnosis    atorvastatin 20 MG tablet    LIPITOR    90 tablet    TAKE 1 TABLET BY MOUTH EVERY DAY    Claudication of left lower extremity (H)       bisacodyl 10 MG Suppository    DULCOLAX     30 suppository    Place 1 suppository (10 mg) rectally daily as needed for constipation    Slow transit constipation       CELEXA PO      Take 10 mg by mouth daily        cholecalciferol 1000 units Tabs     30 tablet    Take 1,000 Units by mouth daily    Loop diuretic causing adverse effect in therapeutic use, subsequent encounter       clopidogrel 75 MG tablet    PLAVIX    90 tablet    Take 1 tablet (75 mg) by mouth daily    Status post percutaneous transluminal coronary angioplasty, Coronary artery disease involving native coronary artery of native heart without angina pectoris       CORTIZONE-10 1 % ointment   Generic drug:  hydrocortisone      Apply topically 2 times daily as needed for itching        ferrous sulfate 325 (65 Fe) MG tablet    IRON    100 tablet    Take 1 tablet (325 mg) by mouth daily    Anemia, unspecified type       LASIX PO      Take 40 mg by mouth daily        MELATONIN PO      Take 5 mg by mouth daily        METFORMIN HCL PO      Take 750 mg by mouth 2 times daily (with meals)        metoprolol succinate 200 MG 24 hr tablet    TOPROL-XL    90 tablet    Take 0.5 tablets (100 mg) by mouth daily    Chronic systolic heart failure (H)       miconazole 2 % powder    MICATIN; MICRO GUARD     Apply topically 3 times daily    Skin disease, fungal       nitroGLYcerin 0.4 MG sublingual tablet    NITROSTAT    25 tablet    For chest pain place 1 tablet under the tongue every 5 minutes for 3 doses. If symptoms persist 5 minutes after 1st dose call 911.    Atrial flutter, unspecified type (H)       order for [facility]     1 Device    Equipment being ordered: 2 shoe    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), Diabetic ulcer of left foot due to type 2 diabetes mellitus (H)       oxyCODONE IR 5 MG tablet    ROXICODONE    20 tablet    Take 1-2 tablets (5-10 mg) by mouth every 4 hours as needed for pain    Osteomyelitis of foot, right, acute (H)       pantoprazole 40 MG EC tablet    PROTONIX    30  tablet    Take 1 tablet (40 mg) by mouth daily    Gastroesophageal reflux disease, esophagitis presence not specified       polyethylene glycol Packet    MIRALAX/GLYCOLAX     Take 1 packet by mouth daily as needed        PROVIDER DOSED MANAGED WARFARIN (COUMADIN) OUTPATIENT      Dose based on INR per Primary MD/Nursing Home MD.    Atrial fibrillation, unspecified type (H)       sodium chloride 0.65 % nasal spray    EQ SALINE NASAL SPRAY    1 Bottle    Spray 1 spray into both nostrils daily as needed for congestion        TYLENOL PO      Take 1,000 mg by mouth every 6 hours        Warfarin Therapy Reminder      1 each continuous prn

## 2018-11-14 ENCOUNTER — ANTICOAGULATION THERAPY VISIT (OUTPATIENT)
Dept: ANTICOAGULATION | Facility: CLINIC | Age: 78
End: 2018-11-14

## 2018-11-14 DIAGNOSIS — I48.91 ATRIAL FIBRILLATION (H): ICD-10-CM

## 2018-11-14 LAB — INR PPP: 1.6

## 2018-11-14 NOTE — MR AVS SNAPSHOT
Tad Manning   11/14/2018   Anticoagulation Therapy Visit    Description:  78 year old male   Provider:  Tad Huerta, [facility]   Department:  [facility]           INR as of 11/14/2018     Today's INR 1.6!      Anticoagulation Summary as of 11/14/2018     INR goal 2.0-3.0   Today's INR 1.6!   Full warfarin instructions 7.5 mg on Mon, Wed, Fri; 5 mg all other days   Next INR check 11/28/2018    Indications   Atrial fibrillation -- on Warfarin [I48.91]  Long-term (current) use of anticoagulants [Z79.01] [Z79.01]         November 2018 Details    Sun Mon Tue Wed Thu Fri Sat         1               2               3                 4               5               6               7               8               9               10                 11               12               13               14      7.5 mg   See details      15      5 mg         16      7.5 mg         17      5 mg           18      5 mg         19      7.5 mg         20      5 mg         21      7.5 mg         22      5 mg         23      7.5 mg         24      5 mg           25      5 mg         26      7.5 mg         27      5 mg         28            29               30                 Date Details   11/14 This INR check       Date of next INR:  11/28/2018         How to take your warfarin dose     To take:  5 mg Take 1 of the 5 mg tablets.    To take:  7.5 mg Take 1.5 of the 5 mg tablets.

## 2018-11-14 NOTE — PROGRESS NOTES
ANTICOAGULATION FOLLOW-UP CLINIC VISIT    Patient Name:  Tad Manning  Date:  11/14/2018  Contact Type:  Telephone    SUBJECTIVE:     Patient Findings     Positives Missed doses (Doses of warfarin missed on 11/10 and 11/11), No Problem Findings           OBJECTIVE    INR   Date Value Ref Range Status   11/14/2018 1.6  Final       ASSESSMENT / PLAN  INR assessment SUB    Recheck INR In: 2 WEEKS    INR Location Home INR      Anticoagulation Summary as of 11/14/2018     INR goal 2.0-3.0   Today's INR 1.6!   Warfarin maintenance plan 7.5 mg (5 mg x 1.5) on Mon, Wed, Fri; 5 mg (5 mg x 1) all other days   Full warfarin instructions 7.5 mg on Mon, Wed, Fri; 5 mg all other days   Weekly warfarin total 42.5 mg   Plan last modified Vicki Hearn RN (7/31/2018)   Next INR check 11/28/2018   Priority INR   Target end date Indefinite    Indications   Atrial fibrillation -- on Warfarin [I48.91]  Long-term (current) use of anticoagulants [Z79.01] [Z79.01]         Anticoagulation Episode Summary     INR check location     Preferred lab     Send INR reminders to [facility]    Comments HIPPA Infor returned. OK to  on cell [phone]. OK to  with Seferino Manning [phone]  labs @ [facility] faxed there 2/16.  fax: [phone]  10/17/2018-pt at [facility] [phone]  Goes by Hima      Anticoagulation Care Providers     Provider Role Specialty Phone number    Clarisse Valdes MD [facility] Cardiology [phone]            See the Encounter Report to view Anticoagulation Flowsheet and Dosing Calendar (Go to Encounters tab in chart review, and find the Anticoagulation Therapy Visit)    Spoke with Home care RN Demario. She reported that Tad has missed doses. Verified his current earfarin dose to be 7.5mgs Mon, Wed, Fri 5mgs all other days.    Tad Huerta [facility]             11/28/18  ADDENDUM  Left message for JULIA Marquez from Cardiology phoned the [facility] with an INR result.  It  was reported to the incorrect department.  Will reach out to Alma at [phone] on 11/29 to confirm INR result and recommendation.    Darien Ballard RN

## 2018-11-16 ENCOUNTER — DOCUMENTATION ONLY (OUTPATIENT)
Dept: ORTHOPEDICS | Facility: CLINIC | Age: 78
End: 2018-11-16

## 2018-11-16 NOTE — PROGRESS NOTES
[facility]. [phone].   S: Pt seen at Main lab with friend/ and reports that Dr Vick just removed his stitches and now he is ready for his  socks to help him get ready for prosthetic fitting. O: I see the order for the shrinkers and I see his limb with steri strips and a dark scab on the anterior distal end at the stitches line. I see he measured 37cm circumference at [facility]. A: He was fit with 2 size 3 [employer]  socks and he was shown how to use the donning tube for application. He seemed to understand. He reports that he will be seeing Dr Vick for last follow up in approx. 1 month at which time he will ask Dr Vick for permission/prescription to begin prosthetic fittings if Dr Vick says his limb is ready to bear weight on. P: He has a scheduled appointment here at the main lab on the day following his appointment with Dr Vick for casting for the prosthesis, if all goes well. G: The goal of the  socks is to compress the BKA and reduce edema and shape his limb for future prosthetic fittings.   This note has been electronically signed by CARL Cohen CPO.     
well-developed/no distress/well-groomed/well-nourished

## 2018-11-19 DIAGNOSIS — I50.23 ACUTE ON CHRONIC SYSTOLIC CONGESTIVE HEART FAILURE (H): Primary | ICD-10-CM

## 2018-11-21 ENCOUNTER — TELEPHONE (OUTPATIENT)
Dept: OTHER | Facility: CLINIC | Age: 78
End: 2018-11-21

## 2018-11-21 NOTE — TELEPHONE ENCOUNTER
Patient LVM to report a red spot on his toe knuckle for the past few days. He has a history of DM and right below the know amputation. He is requesting to see Dr. Vick in clinic to look at his red toe. Routing to scheduling to contact patient and scheduled return visit with Dr. Vick at next available.     Maryellen DIXONN, RN

## 2018-11-23 LAB — COPATH REPORT: NORMAL

## 2018-11-24 NOTE — PROGRESS NOTES
Tad Manning returns today 1 month status post right below-knee amputation.  He has been a resident of the [facility].  Relative to the recent amputation he is without complaints.    Exam:  Right below knee amputation stump minimally edematous.  Excellent skin edge apposition.  Mild central eschar.  No erythema.  All sutures are removed today replacing them with Steri-Strips.    IMPRESSION:  1.  1 month status post right BKA overall doing well.  Sutures removed today.  Small central eschar of 2 or 3 cm not yet ready for weightbearing.    2.  History of left above-knee popliteal to below knee popliteal bypass with reverse greater saphenous vein in January 2014.  That graft was last checked in 2016.    RECOMMENDATION:  I reviewed all the above with Hima.  He will continue his medical regimen including Plavix for drug-eluting coronary stents and Coumadin for atrial fibrillation.  We will ask him to see [facility] to be evaluated for a stump .  He will continue to utilize his [place] stump protector.  Vascular surgical follow-up will be with me in 4 weeks for a recheck of the right BKA and an ultrasound of the left leg bypass graft.    Solis Vick MD

## 2018-11-26 ENCOUNTER — TELEPHONE (OUTPATIENT)
Dept: CARDIOLOGY | Facility: CLINIC | Age: 78
End: 2018-11-26

## 2018-11-26 NOTE — TELEPHONE ENCOUNTER
Spoke with Tad.  He made an appointment with his podiatrist.  He would like to go to that appointment tomorrow and then will call us back to schedule if he needs to move up his 12/19/18 appt with Dr. Vick. Mary Tapia,

## 2018-11-26 NOTE — TELEPHONE ENCOUNTER
Pharmacy requesting to refill warfarin sod 5 mg tablets ([place])    Not listed on pt med list.      Nadya benavidez [phone]  nichole [phone]

## 2018-11-27 ENCOUNTER — OFFICE VISIT (OUTPATIENT)
Dept: PODIATRY | Facility: CLINIC | Age: 78
End: 2018-11-27
Payer: MEDICARE

## 2018-11-27 VITALS — SYSTOLIC BLOOD PRESSURE: 126 MMHG | DIASTOLIC BLOOD PRESSURE: 64 MMHG

## 2018-11-27 DIAGNOSIS — E11.22 TYPE 2 DIABETES MELLITUS WITH STAGE 3 CHRONIC KIDNEY DISEASE, UNSPECIFIED WHETHER LONG TERM INSULIN USE: ICD-10-CM

## 2018-11-27 DIAGNOSIS — L53.9 ERYTHEMA OF TOE: Primary | ICD-10-CM

## 2018-11-27 DIAGNOSIS — I48.20 CHRONIC ATRIAL FIBRILLATION (H): Primary | ICD-10-CM

## 2018-11-27 DIAGNOSIS — G62.9 PERIPHERAL POLYNEUROPATHY: ICD-10-CM

## 2018-11-27 DIAGNOSIS — N18.3 TYPE 2 DIABETES MELLITUS WITH STAGE 3 CHRONIC KIDNEY DISEASE, UNSPECIFIED WHETHER LONG TERM INSULIN USE: ICD-10-CM

## 2018-11-27 DIAGNOSIS — I73.9 PAD (PERIPHERAL ARTERY DISEASE) (H): Primary | ICD-10-CM

## 2018-11-27 PROCEDURE — 99213 OFFICE O/P EST LOW 20 MIN: CPT | Performed by: PODIATRIST

## 2018-11-27 RX ORDER — WARFARIN SODIUM 5 MG/1
TABLET ORAL
Qty: 120 TABLET | Refills: 1 | Status: ON HOLD | OUTPATIENT
Start: 2018-11-27 | End: 2020-01-09

## 2018-11-27 RX ORDER — CEPHALEXIN 500 MG/1
500 CAPSULE ORAL 2 TIMES DAILY
Qty: 20 CAPSULE | Refills: 0 | Status: SHIPPED | OUTPATIENT
Start: 2018-11-27 | End: 2018-12-07

## 2018-11-27 NOTE — PROGRESS NOTES
"PATIENT HISTORY:  Tad Manning is a 78 year old male who presents to clinic for a L 3rd toe that is \"red.\"  1 month duration.  Unsure of a cause.  Reports \"cutting the skin\" recently trimming a callus.  Hx of R BKA.  Hx of DM, neuropathy, PAD.  Pt has been soaking the L foot.  No fevers, chills.  Nonsmoker.  Retired.  No injury.     EXAM:/64    General appearance: Patient is alert and fully cooperative with history & exam.  No sign of distress is noted during the visit.     Dermatologic: Skin is intact to L foot.  Very diffuse callusing at end of L 3rd toe.  Mild erythema to L 3rd toe.  No wound or drainage.       Vascular: DP pulse palpable on the L.  I could not locate PT pulse.  Skin warm.  CFT delayed.     Neurologic: Lower extremity sensation is diminished to light touch b/l.     Musculoskeletal: No pain to L 2nd toe.  Patient is in WC.  S/p R BKA.     ASSESSMENT:   L 2nd toe erythema  DM II with neuropathy  PAD     PLAN:  Reviewed patient's chart in epic.  Discussed condition and treatment options including pros and cons.    Will give Keflex to cover for potential cellulitis.  This could be shoe irritation.  Advised roomier shoes.  Toe seems perfused today.  Seek immediate care for worsening discoloration, redness, drainage, swelling, pain, fever, chills, nausea, vomiting.  Pt at risk for further amputation.  F/u in 2 wks in Podiatry clinic.     Marcelo Andre, JESSICA, FACFAS          "

## 2018-11-27 NOTE — MR AVS SNAPSHOT
After Visit Summary   11/27/2018    Tad Manning    MRN: [id number]           Patient Information     Date Of Birth          1940        Visit Information        Provider Department      11/27/2018 11:15 AM Marcelo Duncan DPM [facility]        Today's Diagnoses     Erythema of toe    -  1      Care Instructions    Thank you for choosing [facility] / [facility]le [facility]!    Follow up with Dr. Alonso in 2 weeks    DR. DUNCAN'S CLINIC LOCATIONS     MONDAY  [place] TUESDAY & FRIDAY AM  JASMINE   [address] [facility]   [address]   [place] [address] [place] [address]   [phone]  [phone]       WEDNESDAY  [place] SCHEDULE SURGERY: [phone]   [address] [facility] APPOINTMENTS: [phone]   [place] [address] BILLING QUESTIONS: [phone]   [phone]         Seek immediate care for worsening redness, drainage, swelling, pain, fever, chills, nausea, vomiting.      Body Mass Index (BMI)  Many things can cause foot and ankle problems. Foot structure, activity level, foot mechanics and injuries are common causes of pain.  One very important issue that often goes unmentioned, is body weight. Extra weight can cause increased stress on muscles, ligaments, bones and tendons.  Sometimes just a few extra pounds is all it takes to put one over her/his threshold. Without reducing that stress, it can be difficult to alleviate pain. Some people are uncomfortable addressing this issue, but we feel it is important for you to think about it. As Foot &  Ankle specialists, our job is addressing the lower extremity problem and possible causes. Regarding extra body weight, we encourage patients to discuss diet and weight management plans with their primary care doctors. It is this team approach that gives you the best opportunity for pain relief and getting you back on your feet.               Follow-ups after your visit        Follow-up notes from your care team     Return if symptoms worsen or fail to improve.      Your next 10 appointments already scheduled     Dec 19, 2018 10:00 AM CST   Return Visit with Amador Vick MD   [facility] ([facility])    [address]  [facility] [address]   [phone]            Jan 14, 2019 11:45 AM CST   Lab with  LAB   [facility] ([facility])    [address]  1st Floor  [facility] [address] [phone]            Jan 14, 2019 12:30 PM CST   (Arrive by 12:15 PM)   Return Visit with Clarisse Valdes MD   [facility] ([facility])    [address]  [address]  [facility] [id number] [phone]              Future tests that were ordered for you today     Open Future Orders        Priority Expected Expires Ordered    US Lower Extremity Arterial Duplex Left Routine 12/1/2018 11/27/2019 11/27/2018            Who to contact     If you have questions or need follow up information about today's clinic visit or your schedule please contact [facility] directly at [phone].  Normal or non-critical lab and imaging results will be communicated to you by MyChart, letter or phone within 4 business days after the clinic has received the results. If you do not hear from us within 7 days, please contact the clinic through MyChart or phone. If you have a critical or abnormal lab result, we will notify you by phone as soon as possible.  Submit refill requests through [employer] or call your pharmacy and they will forward the refill request to us. Please allow 3 business days for your refill to be completed.          Additional Information About Your Visit        [employer] Information     [employer] gives you secure access to your electronic health record. If you see a primary care provider,  you can also send messages to your care team and make appointments. If you have questions, please call your primary care clinic.  If you do not have a primary care provider, please call [phone] and they will assist you.        Care EveryWhere ID     This is your Care EveryWhere ID. This could be used by other organizations to access your [place] medical records  [id number]         Blood Pressure from Last 3 Encounters:   11/27/18 126/64   11/13/18 129/63   11/07/18 144/58    Weight from Last 3 Encounters:   11/07/18 171 lb 1.6 oz (77.6 kg)   11/05/18 170 lb 14.4 oz (77.5 kg)   11/01/18 170 lb 12.8 oz (77.5 kg)              Today, you had the following     No orders found for display         Today's Medication Changes          These changes are accurate as of 11/27/18 11:48 AM.  If you have any questions, ask your nurse or doctor.               Start taking these medicines.        Dose/Directions    cephALEXin 500 MG capsule   Commonly known as:  KEFLEX   Used for:  Erythema of toe   Started by:  Marcelo Andre DPM        Dose:  500 mg   Take 1 capsule (500 mg) by mouth 2 times daily for 10 days   Quantity:  20 capsule   Refills:  0         Stop taking these medicines if you haven't already. Please contact your care team if you have questions.     bisacodyl 10 MG suppository   Commonly known as:  DULCOLAX   Stopped by:  Marcelo Andre DPM                Where to get your medicines      These medications were sent to [facility] [address] [place] [address] N AT [place] [address]  [address] [facility] [address]     Phone:  [phone]     cephALEXin 500 MG capsule                Primary Care Provider Office Phone # Fax #    Gene Guevara [phone]       [address] [address]        Equal Access to Services     STUART ELLISON AH: Yumiko Urena, kellie agudelo, myah hernandez  deandre fonsecacarlos jackiejose valdez'aan ah. So [facility] [phone].    ATENCIÓN: Si habla tyrell, tiene a noe disposición servicios gratuitos de asistencia lingüística. Joey al [phone].    We comply with applicable federal civil rights laws and [place] laws. We do not discriminate on the basis of race, color, national origin, age, disability, sex, sexual orientation, or gender identity.            Thank you!     Thank you for choosing [facility]  for your care. Our goal is always to provide you with excellent care. Hearing back from our patients is one way we can continue to improve our services. Please take a few minutes to complete the written survey that you may receive in the mail after your visit with us. Thank you!             Your Updated Medication List - Protect others around you: Learn how to safely use, store and throw away your medicines at www.disposemymeds.org.          This list is accurate as of 11/27/18 11:48 AM.  Always use your most recent med list.                   Brand Name Dispense Instructions for use Diagnosis    atorvastatin 20 MG tablet    LIPITOR    90 tablet    TAKE 1 TABLET BY MOUTH EVERY DAY    Claudication of left lower extremity (H)       CELEXA PO      Take 10 mg by mouth daily        cephALEXin 500 MG capsule    KEFLEX    20 capsule    Take 1 capsule (500 mg) by mouth 2 times daily for 10 days    Erythema of toe       cholecalciferol 1000 units Tabs     30 tablet    Take 1,000 Units by mouth daily    Loop diuretic causing adverse effect in therapeutic use, subsequent encounter       clopidogrel 75 MG tablet    PLAVIX    90 tablet    Take 1 tablet (75 mg) by mouth daily    Status post percutaneous transluminal coronary angioplasty, Coronary artery disease involving native coronary artery of native heart without angina pectoris       CORTIZONE-10 1 % ointment   Generic drug:  hydrocortisone      Apply topically 2 times daily as needed for itching         ferrous sulfate 325 (65 Fe) MG tablet    FEROSUL    100 tablet    Take 1 tablet (325 mg) by mouth daily    Anemia, unspecified type       LASIX PO      Take 40 mg by mouth daily        MELATONIN PO      Take 5 mg by mouth daily        METFORMIN HCL PO      Take 750 mg by mouth 2 times daily (with meals)        metoprolol succinate 200 MG 24 hr tablet    TOPROL-XL    90 tablet    Take 0.5 tablets (100 mg) by mouth daily    Chronic systolic heart failure (H)       miconazole 2 % powder    MICATIN; MICRO GUARD     Apply topically 3 times daily    Skin disease, fungal       nitroGLYcerin 0.4 MG sublingual tablet    NITROSTAT    25 tablet    For chest pain place 1 tablet under the tongue every 5 minutes for 3 doses. If symptoms persist 5 minutes after 1st dose call 911.    Atrial flutter, unspecified type (H)       order for [facility]     1 Device    Equipment being ordered: DH2 shoe    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), Diabetic ulcer of left foot due to type 2 diabetes mellitus (H)       oxyCODONE 5 MG tablet    ROXICODONE    20 tablet    Take 1-2 tablets (5-10 mg) by mouth every 4 hours as needed for pain    Osteomyelitis of foot, right, acute (H)       pantoprazole 40 MG EC tablet    PROTONIX    30 tablet    Take 1 tablet (40 mg) by mouth daily    Gastroesophageal reflux disease, esophagitis presence not specified       polyethylene glycol packet    MIRALAX/GLYCOLAX     Take 1 packet by mouth daily as needed        PROVIDER DOSED MANAGED WARFARIN (COUMADIN) OUTPATIENT      Dose based on INR per Primary MD/Nursing Home MD.    Atrial fibrillation, unspecified type (H)       sodium chloride 0.65 % nasal spray    EQ SALINE NASAL SPRAY    1 Bottle    Spray 1 spray into both nostrils daily as needed for congestion        TYLENOL PO      Take 1,000 mg by mouth every 6 hours        Warfarin Therapy Reminder      1 each continuous prn

## 2018-11-27 NOTE — LETTER
"    11/27/2018         RE: Tad Manning  [address] [place] [id number]        Dear Colleague,    Thank you for referring your patient, Tad Manning, to the [facility]. Please see a copy of my visit note below.    PATIENT HISTORY:  Tad Manning is a 78 year old male who presents to clinic for a L 3rd toe that is \"red.\"  1 month duration.  Unsure of a cause.  Reports \"cutting the skin\" recently trimming a callus.  Hx of R BKA.  Hx of DM, neuropathy, PAD.  Pt has been soaking the L foot.  No fevers, chills.  Nonsmoker.  Retired.  No injury.     EXAM:/64    General appearance: Patient is alert and fully cooperative with history & exam.  No sign of distress is noted during the visit.     Dermatologic: Skin is intact to L foot.  Very diffuse callusing at end of L 3rd toe.  Mild erythema to L 3rd toe.  No wound or drainage.       Vascular: DP pulse palpable on the L.  I could not locate PT pulse.  Skin warm.  CFT delayed.     Neurologic: Lower extremity sensation is diminished to light touch b/l.     Musculoskeletal: No pain to L 2nd toe.  Patient is in WC.  S/p R BKA.     ASSESSMENT:   L 2nd toe erythema  DM II with neuropathy  PAD     PLAN:  Reviewed patient's chart in epic.  Discussed condition and treatment options including pros and cons.    Will give Keflex to cover for potential cellulitis.  This could be shoe irritation.  Advised roomier shoes.  Toe seems perfused today.  Seek immediate care for worsening discoloration, redness, drainage, swelling, pain, fever, chills, nausea, vomiting.  Pt at risk for further amputation.  F/u in 2 wks in Podiatry clinic.     Marcelo Andre DPM, FACFAS            Again, thank you for allowing me to participate in the care of your patient.        Sincerely,        Marcelo Andre DPM    "

## 2018-11-27 NOTE — TELEPHONE ENCOUNTER
Spoke with Ellen at [facility] INR clinic who stated that she will contact the patient to set up warfarin.

## 2018-11-27 NOTE — PATIENT INSTRUCTIONS
Thank you for choosing [facility]!    Follow up with Dr. Alonso in 2 weeks    DR. DUNCAN'S CLINIC LOCATIONS     MONDAY  [place] TUESDAY & FRIDAY AM  JASM[place]   [address] [facility]   [address]   [place] [address] [place] [address]   [phone]  [phone]       WEDNESDAY  [place] SCHEDULE SURGERY: [phone]   [address] APPOINTMENTS: [phone]   [place] [address] BILLING QUESTIONS: [phone]   [phone]         Seek immediate care for worsening redness, drainage, swelling, pain, fever, chills, nausea, vomiting.      Body Mass Index (BMI)  Many things can cause foot and ankle problems. Foot structure, activity level, foot mechanics and injuries are common causes of pain.  One very important issue that often goes unmentioned, is body weight. Extra weight can cause increased stress on muscles, ligaments, bones and tendons.  Sometimes just a few extra pounds is all it takes to put one over her/his threshold. Without reducing that stress, it can be difficult to alleviate pain. Some people are uncomfortable addressing this issue, but we feel it is important for you to think about it. As Foot &  Ankle specialists, our job is addressing the lower extremity problem and possible causes. Regarding extra body weight, we encourage patients to discuss diet and weight management plans with their primary care doctors. It is this team approach that gives you the best opportunity for pain relief and getting you back on your feet.

## 2018-11-28 LAB — INR PPP: 2.3

## 2018-11-29 ENCOUNTER — ANTICOAGULATION THERAPY VISIT (OUTPATIENT)
Dept: ANTICOAGULATION | Facility: CLINIC | Age: 78
End: 2018-11-29

## 2018-11-29 DIAGNOSIS — I48.91 ATRIAL FIBRILLATION, UNSPECIFIED TYPE (H): ICD-10-CM

## 2018-11-29 NOTE — MR AVS SNAPSHOT
Tad Tim Manning   11/29/2018   Anticoagulation Therapy Visit    Description:  78 year old male   Provider:  Darien Ballard RN   Department:  [facility]           INR as of 11/29/2018     Today's INR 2.3 (11/28/2018)      Anticoagulation Summary as of 11/29/2018     INR goal 2.0-3.0   Today's INR 2.3 (11/28/2018)   Full warfarin instructions 7.5 mg on Mon, Wed, Fri; 5 mg all other days   Next INR check 12/5/2018    Indications   Atrial fibrillation -- on Warfarin [I48.91]  Long-term (current) use of anticoagulants [Z79.01] [Z79.01]         November 2018 Details    Sun Mon Tue Wed Thu Fri Sat         1               2               3                 4               5               6               7               8               9               10                 11               12               13               14               15               16               17                 18               19               20               21               22               23               24                 25               26               27               28               29      5 mg   See details      30      7.5 mg           Date Details   11/29 This INR check               How to take your warfarin dose     To take:  5 mg Take 1 of the 5 mg tablets.    To take:  7.5 mg Take 1.5 of the 5 mg tablets.           December 2018 Details    Sun Mon Tue Wed Thu Fri Sat           1      5 mg           2      5 mg         3      7.5 mg         4      5 mg         5            6               7               8                 9               10               11               12               13               14               15                 16               17               18               19               20               21               22                 23               24               25               26               27               28               29                 30               31                      Date Details   No additional details    Date of next INR:  12/5/2018         How to take your warfarin dose     To take:  5 mg Take 1 of the 5 mg tablets.    To take:  7.5 mg Take 1.5 of the 5 mg tablets.

## 2018-11-29 NOTE — PROGRESS NOTES
11/29/18 spoke to home care.  Tad was discharged home from [facility] recently.  He has continued on maintenance dosing schedule of 7.5mg MWF, 5mg all other days of the week.  He will be seen by home care through 1/23/19.  Weekly visits on Wednesday.  Updated calendar.    Darien Ballard RN    ANTICOAGULATION FOLLOW-UP CLINIC VISIT    Patient Name:  Tad Manning  Date:  11/29/2018  Contact Type:  Telephone    SUBJECTIVE:     Patient Findings     Positives No Problem Findings    Comments Spoke to OH Marquez.           OBJECTIVE    INR   Date Value Ref Range Status   11/28/2018 2.3  Corrected     Comment:     Home care INR       ASSESSMENT / PLAN  INR assessment THER    Recheck INR In: 1 WEEK    INR Location Homecare INR      Anticoagulation Summary as of 11/29/2018     INR goal 2.0-3.0   Today's INR 2.3 (11/28/2018)   Warfarin maintenance plan 7.5 mg (5 mg x 1.5) on Mon, Wed, Fri; 5 mg (5 mg x 1) all other days   Full warfarin instructions 7.5 mg on Mon, Wed, Fri; 5 mg all other days   Weekly warfarin total 42.5 mg   Plan last modified Vicki Hearn RN (7/31/2018)   Next INR check 12/5/2018   Priority INR   Target end date Indefinite    Indications   Atrial fibrillation -- on Warfarin [I48.91]  Long-term (current) use of anticoagulants [Z79.01] [Z79.01]         Anticoagulation Episode Summary     INR check location     Preferred lab     Send INR reminders to [facility]    Comments HIPPA Infor returned. OK to  on cell [phone]. OK to  with Seferino Mannlylalillian [phone]  labs @ [facility] faxed there 2/16.  fax: [phone]  10/17/2018-pt at [facility]-[phone]  Goes by Hima      Anticoagulation Care Providers     Provider Role Specialty Phone number    Clarisse Valdes MD Responsible Cardiology [phone]            See the Encounter Report to view Anticoagulation Flowsheet and Dosing Calendar (Go to Encounters tab in chart review, and find the Anticoagulation Therapy  Visit)    Spoke with home care nurse, she will contact patient. Gave them their lab results and new warfarin recommendation.  No changes in health, medication, or diet. No missed doses, no falls. No signs or symptoms of bleed or clotting.    Darien Ballard, RN

## 2018-12-05 ENCOUNTER — ANTICOAGULATION THERAPY VISIT (OUTPATIENT)
Dept: ANTICOAGULATION | Facility: CLINIC | Age: 78
End: 2018-12-05

## 2018-12-05 DIAGNOSIS — I48.91 ATRIAL FIBRILLATION, UNSPECIFIED TYPE (H): ICD-10-CM

## 2018-12-05 LAB — INR PPP: 2.5

## 2018-12-05 NOTE — MR AVS SNAPSHOT
Tad Tim Manning   12/5/2018   Anticoagulation Therapy Visit    Description:  78 year old male   Provider:  Darien Ballard RN   Department:  [facility]           INR as of 12/5/2018     Today's INR 2.5      Anticoagulation Summary as of 12/5/2018     INR goal 2.0-3.0   Today's INR 2.5   Full warfarin instructions 7.5 mg on Mon, Wed, Fri; 5 mg all other days   Next INR check 12/19/2018    Indications   Atrial fibrillation -- on Warfarin [I48.91]  Long-term (current) use of anticoagulants [Z79.01] [Z79.01]         December 2018 Details    Sun Mon Tue Wed Thu Fri Sat           1                 2               3               4               5      7.5 mg   See details      6      5 mg         7      7.5 mg         8      5 mg           9      5 mg         10      7.5 mg         11      5 mg         12      7.5 mg         13      5 mg         14      7.5 mg         15      5 mg           16      5 mg         17      7.5 mg         18      5 mg         19            20               21               22                 23               24               25               26               27               28               29                 30               31                     Date Details   12/05 This INR check       Date of next INR:  12/19/2018         How to take your warfarin dose     To take:  5 mg Take 1 of the 5 mg tablets.    To take:  7.5 mg Take 1.5 of the 5 mg tablets.

## 2018-12-05 NOTE — PROGRESS NOTES
ANTICOAGULATION FOLLOW-UP CLINIC VISIT    Patient Name:  Tad Manning  Date:  12/5/2018  Contact Type:  Telephone    SUBJECTIVE:     Patient Findings     Positives No Problem Findings    Comments Spoke to home care nurse, Stephen and patient.  Hima stated that he is on Plavix.  He will see Dr. Valdes in January.  Requested next INR at home visit on 12/19 in the afternoon.           OBJECTIVE    INR   Date Value Ref Range Status   12/05/2018 2.5  Final     Comment:     Home care INR        ASSESSMENT / PLAN  INR assessment THER    Recheck INR In: 2 WEEKS    INR Location Homecare INR      Anticoagulation Summary as of 12/5/2018     INR goal 2.0-3.0   Today's INR 2.5   Warfarin maintenance plan 7.5 mg (5 mg x 1.5) on Mon, Wed, Fri; 5 mg (5 mg x 1) all other days   Full warfarin instructions 7.5 mg on Mon, Wed, Fri; 5 mg all other days   Weekly warfarin total 42.5 mg   No change documented Draien Ballard RN   Plan last modified Vicki Hearn RN (7/31/2018)   Next INR check 12/19/2018   Priority INR   Target end date Indefinite    Indications   Atrial fibrillation -- on Warfarin [I48.91]  Long-term (current) use of anticoagulants [Z79.01] [Z79.01]         Anticoagulation Episode Summary     INR check location     Preferred lab     Send INR reminders to [facility]    Comments HIPPA Infor returned. OK to  on cell [phone]. OK to  with Seferino Manning [phone]  labs @ [facility] faxed there 2/16.  fax: [phone]  10/17/2018-pt at [place] [phone]  Goes by Hima      Anticoagulation Care Providers     Provider Role Specialty Phone number    Clarisse Valdes MD Responsible Cardiology [phone]            See the Encounter Report to view Anticoagulation Flowsheet and Dosing Calendar (Go to Encounters tab in chart review, and find the Anticoagulation Therapy Visit)    Spoke with patient. Gave them their lab results and new warfarin recommendation.  No changes in  health, medication, or diet. No missed doses, no falls. No signs or symptoms of bleed or clotting.    Darien Ballard RN      12/19/18 ADDENDUM  Spoke to OH Arriola.  Updated calendar.  She will draw Hima on Friday.    Darien Ballard RN

## 2018-12-13 ENCOUNTER — OFFICE VISIT (OUTPATIENT)
Dept: PODIATRY | Facility: CLINIC | Age: 78
End: 2018-12-13
Payer: MEDICARE

## 2018-12-13 ENCOUNTER — ANCILLARY PROCEDURE (OUTPATIENT)
Dept: GENERAL RADIOLOGY | Facility: CLINIC | Age: 78
End: 2018-12-13
Attending: PODIATRIST
Payer: MEDICARE

## 2018-12-13 VITALS
BODY MASS INDEX: 23.7 KG/M2 | WEIGHT: 175 LBS | HEIGHT: 72 IN | SYSTOLIC BLOOD PRESSURE: 120 MMHG | DIASTOLIC BLOOD PRESSURE: 62 MMHG

## 2018-12-13 DIAGNOSIS — M79.89 TOE SWELLING: Primary | ICD-10-CM

## 2018-12-13 PROCEDURE — 73630 X-RAY EXAM OF FOOT: CPT | Mod: LT

## 2018-12-13 PROCEDURE — 99213 OFFICE O/P EST LOW 20 MIN: CPT | Performed by: PODIATRIST

## 2018-12-13 ASSESSMENT — MIFFLIN-ST. JEOR: SCORE: 1561.79

## 2018-12-13 NOTE — PROGRESS NOTES
Foot & Ankle Surgery   December 13, 2018    S:  Pt is seen today for evaluation of ***.    Vitals:    12/13/18 1102   BP: 120/62   Weight: 79.4 kg (175 lb)   Height: 1.829 m (6')   '      ROS - Pos for CC.  Patient denies current nausea, vomiting, chills, fevers, belly pain, calf pain, chest pain or SOB.  Complete remainder of ROS it otherwise neg.  ***    PE:  Gen:   No apparent distress  Eye:    Visual scanning without deficit  Ear:    Response to auditory stimuli wnl  Lung:    Non-labored breathing on RA noted  Abd:    NTND per patient report  Lymph:    Neg for pitting/non-pitting edema BLE  Vasc:    Pulses palpable, CFT minimally delayed  Neuro:    Light touch sensation intact to all sensory nerve distributions without paresthesias  Derm:    Neg for nodules, lesions or ulcerations  MSK:    ROM, strength wnl without limitation, no pain on palpation noted.  Calf:    Neg for redness, swelling or tenderness  ***    Imaging:  ***    Labs:  ***    Cultures:  ***      Assessment:  78 year old male with ***      Plan:  Discussed etiologies, anatomy and options  1.  ***  -***    Follow up:  *** or sooner with acute issues      Body mass index is 23.73 kg/m .  {Weight Management Plan -- Delete if patient has a normal BMI:780127}         Enzo Alonso DPM FACFAS FACFAOM  Podiatric Foot & Ankle Surgeon  [facility]  [phone]

## 2018-12-13 NOTE — PROGRESS NOTES
Foot & Ankle Surgery   December 13, 2018    S:  Pt is seen today for evaluation of L 3rd toe. He was seen by Dr Andre about 2 weeks ago.  He had callusing at the end of the toe, and while there was no open wound, the toe was reds/swollen.  He was put on keflex and advised to follow up in a few weeks. He's seen today in Dr Andre's absence.  He recently underwent a right lower extremity BK amp    Vitals:    12/13/18 1102   BP: 120/62   Weight: 79.4 kg (175 lb)   Height: 1.829 m (6')   '      ROS - Pos for CC.  Patient denies current nausea, vomiting, chills, fevers, belly pain, calf pain, chest pain or SOB.  Complete remainder of ROS it otherwise neg.      PE:  Gen:   No apparent distress  Eye:    Visual scanning without deficit  Ear:    Response to auditory stimuli wnl  Lung:    Non-labored breathing on RA noted  Abd:    NTND per patient report  Lymph:    Neg for pitting/non-pitting edema BLE  Vasc:    Pulses not readily palpable, CFT minimally delayed  Neuro:    Light touch sensation greatly diminished distally  Derm:    Distal L 3rd toe shows minimal callusing and no open wounds are noted on the toe/left foot  MSK:    Right lower extremity BK amp; left lower extremity previous 2nd toe amp.  Hammertoes but no acute changes.  Calf:    Neg for redness, swelling or tenderness    Imaging - no clear 3rd toe fracture/dislocation, no lytic lesion.  previous 2nd toe amputation.    Assessment:  78 year old male with mild L 3rd toe swelling      Plan:  Discussed etiologies, anatomy and options  1.  Mild left 3rd toe swelling in setting of DMII with PAD and previous L 2nd toe and right lower extremity BK amputations  -no wound noted, no indication for wound care  -mild red/swelling but no wound/cellulitis, no indication for PO abx  -xrays today to assess underlying bone  -has orthotics; WBAT in shoe  -advised to closely monitor feet    Follow up:  3 mo or sooner with acute issues      Body mass index is 23.73  kg/m .           Enzo Alonso, DPM FACFAS FACFAOM  Podiatric Foot & Ankle Surgeon  [facility]  [phone]

## 2018-12-19 ENCOUNTER — OFFICE VISIT (OUTPATIENT)
Dept: OTHER | Facility: CLINIC | Age: 78
End: 2018-12-19
Attending: SURGERY
Payer: MEDICARE

## 2018-12-19 ENCOUNTER — HOSPITAL ENCOUNTER (OUTPATIENT)
Dept: ULTRASOUND IMAGING | Facility: CLINIC | Age: 78
Discharge: HOME | End: 2018-12-19
Attending: SURGERY | Admitting: SURGERY
Payer: MEDICARE

## 2018-12-19 VITALS — SYSTOLIC BLOOD PRESSURE: 156 MMHG | HEART RATE: 70 BPM | DIASTOLIC BLOOD PRESSURE: 71 MMHG

## 2018-12-19 DIAGNOSIS — Z09 SURGICAL FOLLOW-UP CARE: Primary | ICD-10-CM

## 2018-12-19 DIAGNOSIS — Z95.828 HISTORY OF ARTERIAL BYPASS OF LOWER EXTREMITY: ICD-10-CM

## 2018-12-19 DIAGNOSIS — I73.9 PAD (PERIPHERAL ARTERY DISEASE) (H): ICD-10-CM

## 2018-12-19 DIAGNOSIS — Z89.511 HISTORY OF RIGHT BELOW KNEE AMPUTATION (H): ICD-10-CM

## 2018-12-19 PROCEDURE — 93926 LOWER EXTREMITY STUDY: CPT | Mod: LT

## 2018-12-19 PROCEDURE — G0463 HOSPITAL OUTPT CLINIC VISIT: HCPCS | Mod: 25

## 2018-12-19 PROCEDURE — 99024 POSTOP FOLLOW-UP VISIT: CPT | Performed by: SURGERY

## 2018-12-19 NOTE — NURSING NOTE
Tad Manning is a 78 year old male who presents for:  Chief Complaint   Patient presents with     RECHECK     1 month follow up to 11/13/18 appt, hx right BKA        Vitals:    Vitals:    12/19/18 1047   BP: 156/71   BP Location: Right arm   Patient Position: Sitting   Cuff Size: Adult Regular   Pulse: 70       BMI:  Estimated body mass index is 23.73 kg/m  as calculated from the following:    Height as of 12/13/18: 6' (1.829 m).    Weight as of 12/13/18: 175 lb (79.4 kg).    Pain Score:  Data Unavailable        Deborah Torres

## 2018-12-20 ENCOUNTER — DOCUMENTATION ONLY (OUTPATIENT)
Dept: ORTHOPEDICS | Facility: CLINIC | Age: 78
End: 2018-12-20

## 2018-12-20 NOTE — PROGRESS NOTES
S: Pt seen at Main lab with  and Lyubov PIERRE from the  for casting for his initial BK prosthesis. He reports that he wants to drive again and may be walking through grassy areas but primarily on sidewalks and flat surfaces. This indicates he will be a High K-2 ambulatory. At 78 yrs old he reports being retired.   He lost his limb due to Diabetes.  Functional limitations: He is unable to walk without his RT lower limb post amputation and uses a wheelchair to get around and to my office.   Functional Capabilities: He was able to walk about freely with no assistive devises prior to amputation and hopes to return to his pre amputation lifestyle.  Functional level: As mentioned above K-2 level seems appropriate for his pre amputation lifestyle of sidewalks, flat surfaces and driving.   Condition of limb: Good with well healed incision and no sores, skin looks good condition.   Condition of Prosthesis: He currently has no prosthesis and is a fresh amputee.  Past experience with prosthesis: This will be his first prosthesis so has no Hx of prosthetic use.  Motivation to use a prosthesis: High motivation with regards to being able to return to his pre amputation lifestyle.   Physical exam: I got his height at 6' tall and 180# body weight. Good strength and ROM.   O: I hear the patient report that Dr Vick gave his OK to begin prosthetic fitting/Training. I see his bar to floor height is 49cm, foot size 27cm. He measured 31cm distal. Normal bench alignment will be good to start.  A: Cast was taken over Ossur Dermo liner 6mm thick locking with bulldog style lock. Liner size 30cm. P: We will see him again when ready for test socket trial and he will also be going to PT for required documentation. I will order the locking liners [id number] and Leander Sushma Chad foot 27cm. G: The goal will be to provide an appropriate prosthesis for his weight and activity level of K-2.   Electronically signed Gene Thompson CPO, LPO

## 2018-12-21 ENCOUNTER — ANTICOAGULATION THERAPY VISIT (OUTPATIENT)
Dept: ANTICOAGULATION | Facility: CLINIC | Age: 78
End: 2018-12-21

## 2018-12-21 DIAGNOSIS — I48.91 ATRIAL FIBRILLATION, UNSPECIFIED TYPE (H): ICD-10-CM

## 2018-12-21 LAB — INR PPP: 2.4

## 2018-12-21 NOTE — PROGRESS NOTES
ANTICOAGULATION FOLLOW-UP CLINIC VISIT    Patient Name:  Tad Manning  Date:  2018  Contact Type:  Telephone    SUBJECTIVE:     Patient Findings     Positives:   No Problem Findings           OBJECTIVE    INR   Date Value Ref Range Status   2018 2.40  Final     Comment:     Outside Lab       ASSESSMENT / PLAN  INR assessment THER    Recheck INR In: 2 WEEKS    INR Location Outside lab      Anticoagulation Summary  As of 2018    INR goal:   2.0-3.0   TTR:   44.3 % (8.5 mo)   INR used for dosin.40 (2018)   Warfarin maintenance plan:   7.5 mg (5 mg x 1.5) every Mon, Wed, Fri; 5 mg (5 mg x 1) all other days   Full warfarin instructions:   7.5 mg every Mon, Wed, Fri; 5 mg all other days   Weekly warfarin total:   42.5 mg   Plan last modified:   Vicki Hearn RN (2018)   Next INR check:   2019   Priority:   INR   Target end date:   Indefinite    Indications    Atrial fibrillation -- on Warfarin [I48.91]  Long-term (current) use of anticoagulants [Z79.01] [Z79.01]             Anticoagulation Episode Summary     INR check location:       Preferred lab:       Send INR reminders to:   [facility]    Comments:   HIPPA Infor returned. OK to  on cell [phone]. OK to  with Seferino Manning [phone]  labs @ [facility] faxed there .  fax: [phone]  [phone]  Goes by Hima      Anticoagulation Care Providers     Provider Role Specialty Phone number    Clarisse Valdes MD Responsible Cardiology [phone]            See the Encounter Report to view Anticoagulation Flowsheet and Dosing Calendar (Go to Encounters tab in chart review, and find the Anticoagulation Therapy Visit)    Spoke with DARIUSZ Mitchell with [facility]. Gave them new warfarin recommendation.  No changes in health, medication, or diet. No missed doses, no falls. No signs or symptoms of bleed or clotting.     Ellen Santizo RN

## 2018-12-24 DIAGNOSIS — I50.22 CHRONIC SYSTOLIC HEART FAILURE (H): ICD-10-CM

## 2018-12-24 NOTE — PROGRESS NOTES
Tad Manning is a 78-year-old diabetic with multiple other atherosclerotic risk factors who is status post a right below knee amputation performed by me on 10/11/2018.  He presents today for his third postoperative check of the stump.  He has been utilizing a stump  and has no complaints relative to the amputation.    He also has history of a left above-knee popliteal to below-knee popliteal bypass with reverse greater saphenous vein and subsequent amputation of the left second toe.  At today's visit he is getting an ultrasound evaluation of that bypass.    Exam:  The right BKA stump looks great and the incision line is fully healed.  There is excellent soft tissue coverage over the bone.  Strong left-sided pedal Doppler signals.    Imaging:  ULTRASOUND LEFT LOWER EXTREMITY ARTERIAL DUPLEX  12/19/2018 10:49 AM      HISTORY:  History of left above-knee to below-knee popliteal bypass  1/10/2014.  PAD (peripheral artery disease) (H).     COMPARISON: 6/29/2016.     FINDINGS: Color Doppler and spectral waveform analysis was performed  throughout the left above-knee to below-knee popliteal bypass. The  bypass is patent. Inflow and outflow arteries are patent. Bypass  diameters range between 5.3 and 7 mm.                                                                      IMPRESSION: Patent left above-knee to below-knee popliteal artery  bypass.     CHAU LUGO DO          IMPRESSION:  1.  2-month status post right below-knee amputation clinically doing well.  He is ready to begin fitting of a right below-knee prosthesis.    2.  4 years status post left above-knee popliteal to below knee popliteal bypass clinically doing well with widely patent graft.    RECOMMENDATION:  I reviewed all the above with Hima.  We will contact [facility] and I allow them to begin fitting Hima for his below-knee prosthesis.  He will continue his medical regimen which includes Plavix for drug-eluting coronary  stents and warfarin for atrial fibrillation.  Vascular surgical follow-up will be with me in 1 year for repeat ultrasound of the left leg bypass graft.    Solis Vick MD

## 2018-12-26 RX ORDER — METOPROLOL SUCCINATE 200 MG/1
100 TABLET, EXTENDED RELEASE ORAL DAILY
Qty: 90 TABLET | Refills: 3 | Status: SHIPPED | OUTPATIENT
Start: 2018-12-26 | End: 2018-12-27

## 2018-12-27 ENCOUNTER — TELEPHONE (OUTPATIENT)
Dept: CARDIOLOGY | Facility: CLINIC | Age: 78
End: 2018-12-27

## 2018-12-27 DIAGNOSIS — I50.22 CHRONIC SYSTOLIC HEART FAILURE (H): ICD-10-CM

## 2018-12-27 RX ORDER — METOPROLOL SUCCINATE 100 MG/1
100 TABLET, EXTENDED RELEASE ORAL DAILY
Qty: 90 TABLET | Refills: 3 | Status: SHIPPED | OUTPATIENT
Start: 2018-12-27 | End: 2020-01-09

## 2018-12-27 NOTE — TELEPHONE ENCOUNTER
I called the pharmacy back and clarified Tad has been on Toprol 100mg daily since 05/2018 (he previously had been on Toprol 200mg daily).  I also called and left VM for Tad to clarify and get us all on the same page. Nicole Hollingsworth RN

## 2018-12-27 NOTE — TELEPHONE ENCOUNTER
[facility] Call Center    Phone Message    May a detailed message be left on voicemail: yes    Reason for Call: Medication Question or concern regarding medication   Prescription Clarification  Name of Medication: Metoprolo Succinate  Prescribing Provider: Jess Chi   Pharmacy: [facility]   What on the order needs clarification? There has been a change in dose, but no change in quantity: Is this intentional?          Action Taken: Message routed to:  [facility] ([facility] p cardiology

## 2019-01-04 ENCOUNTER — ANTICOAGULATION THERAPY VISIT (OUTPATIENT)
Dept: ANTICOAGULATION | Facility: CLINIC | Age: 79
End: 2019-01-04

## 2019-01-04 DIAGNOSIS — I48.91 ATRIAL FIBRILLATION, UNSPECIFIED TYPE (H): ICD-10-CM

## 2019-01-04 DIAGNOSIS — I50.23 ACUTE ON CHRONIC SYSTOLIC CONGESTIVE HEART FAILURE (H): ICD-10-CM

## 2019-01-04 DIAGNOSIS — I48.20 CHRONIC ATRIAL FIBRILLATION (H): Primary | ICD-10-CM

## 2019-01-04 DIAGNOSIS — Z79.01 LONG TERM CURRENT USE OF ANTICOAGULANT THERAPY: ICD-10-CM

## 2019-01-04 DIAGNOSIS — I48.20 CHRONIC ATRIAL FIBRILLATION (H): ICD-10-CM

## 2019-01-04 LAB
ANION GAP SERPL CALCULATED.3IONS-SCNC: 7 MMOL/L (ref 3–14)
BUN SERPL-MCNC: 23 MG/DL (ref 7–30)
CALCIUM SERPL-MCNC: 9 MG/DL (ref 8.5–10.1)
CHLORIDE SERPL-SCNC: 105 MMOL/L (ref 94–109)
CO2 SERPL-SCNC: 26 MMOL/L (ref 20–32)
CREAT SERPL-MCNC: 1.03 MG/DL (ref 0.66–1.25)
GFR SERPL CREATININE-BSD FRML MDRD: 70 ML/MIN/{1.73_M2}
GLUCOSE SERPL-MCNC: 192 MG/DL (ref 70–99)
INR PPP: 1.65 (ref 0.86–1.14)
POTASSIUM SERPL-SCNC: 4.3 MMOL/L (ref 3.4–5.3)
SODIUM SERPL-SCNC: 138 MMOL/L (ref 133–144)

## 2019-01-04 PROCEDURE — 36415 COLL VENOUS BLD VENIPUNCTURE: CPT | Performed by: INTERNAL MEDICINE

## 2019-01-04 PROCEDURE — 85610 PROTHROMBIN TIME: CPT | Performed by: INTERNAL MEDICINE

## 2019-01-04 PROCEDURE — 80048 BASIC METABOLIC PNL TOTAL CA: CPT | Performed by: INTERNAL MEDICINE

## 2019-01-04 NOTE — PROGRESS NOTES
ANTICOAGULATION FOLLOW-UP CLINIC VISIT    Patient Name:  Tad Manning  Date:  2019  Contact Type:  Telephone    SUBJECTIVE:     Patient Findings     Positives:   Missed doses    Comments:   Pt reports missing 2 doses in the last 2 weeks with the holidays.  He does not know what days this occurred, writer plots out 2 missed days on the calendar.  Pt has been discharged from [facility] on 11/10.  Next INR to be done when pt returns to the  on .           OBJECTIVE    INR   Date Value Ref Range Status   2019 1.65 (H) 0.86 - 1.14 Final       ASSESSMENT / PLAN  INR assessment SUB    Recheck INR In: 10 DAYS    INR Location Clinic      Anticoagulation Summary  As of 2019    INR goal:   2.0-3.0   TTR:   44.8 % (9 mo)   INR used for dosin.65! (2019)   Warfarin maintenance plan:   7.5 mg (5 mg x 1.5) every Mon, Wed, Fri; 5 mg (5 mg x 1) all other days   Full warfarin instructions:   : 7.5 mg; Otherwise 7.5 mg every Mon, Wed, Fri; 5 mg all other days   Weekly warfarin total:   42.5 mg   Plan last modified:   Vicki Hearn RN (2018)   Next INR check:   2019   Priority:   INR   Target end date:   Indefinite    Indications    Atrial fibrillation -- on Warfarin [I48.91]  Long-term (current) use of anticoagulants [Z79.01] [Z79.01]             Anticoagulation Episode Summary     INR check location:       Preferred lab:       Send INR reminders to:   [facility]    Comments:   HIPPA Infor returned. OK to  on cell [phone]. OK to  with Seferino Manning [phone]  labs @ [facility] faxed there .  fax: [phone]  [phone]  Goes by Hima      Anticoagulation Care Providers     Provider Role Specialty Phone number    Clarisse Valdes MD Responsible Cardiology [phone]            See the Encounter Report to view Anticoagulation Flowsheet and Dosing Calendar (Go to Encounters tab in chart review, and find the  Anticoagulation Therapy Visit)    See above notation     Zeinab Vargas RN

## 2019-01-10 DIAGNOSIS — D64.9 LOW HEMOGLOBIN: Primary | ICD-10-CM

## 2019-01-14 ENCOUNTER — ANTICOAGULATION THERAPY VISIT (OUTPATIENT)
Dept: ANTICOAGULATION | Facility: CLINIC | Age: 79
End: 2019-01-14

## 2019-01-14 ENCOUNTER — OFFICE VISIT (OUTPATIENT)
Dept: CARDIOLOGY | Facility: CLINIC | Age: 79
End: 2019-01-14
Attending: INTERNAL MEDICINE
Payer: MEDICARE

## 2019-01-14 VITALS
HEART RATE: 65 BPM | DIASTOLIC BLOOD PRESSURE: 70 MMHG | OXYGEN SATURATION: 98 % | SYSTOLIC BLOOD PRESSURE: 162 MMHG | WEIGHT: 181.8 LBS | HEIGHT: 72 IN | BODY MASS INDEX: 24.62 KG/M2

## 2019-01-14 DIAGNOSIS — I48.0 PAROXYSMAL ATRIAL FIBRILLATION (H): ICD-10-CM

## 2019-01-14 DIAGNOSIS — I48.20 CHRONIC ATRIAL FIBRILLATION (H): ICD-10-CM

## 2019-01-14 DIAGNOSIS — I10 ESSENTIAL HYPERTENSION: Primary | ICD-10-CM

## 2019-01-14 DIAGNOSIS — E78.2 MIXED HYPERLIPIDEMIA: ICD-10-CM

## 2019-01-14 DIAGNOSIS — I25.10 CORONARY ARTERY DISEASE INVOLVING NATIVE CORONARY ARTERY OF NATIVE HEART WITHOUT ANGINA PECTORIS: ICD-10-CM

## 2019-01-14 DIAGNOSIS — D64.9 LOW HEMOGLOBIN: ICD-10-CM

## 2019-01-14 DIAGNOSIS — I50.22 CHRONIC SYSTOLIC CONGESTIVE HEART FAILURE (H): ICD-10-CM

## 2019-01-14 DIAGNOSIS — I48.91 ATRIAL FIBRILLATION, UNSPECIFIED TYPE (H): ICD-10-CM

## 2019-01-14 LAB
HGB BLD-MCNC: 12.6 G/DL (ref 13.3–17.7)
INR PPP: 2.3 (ref 0.86–1.14)

## 2019-01-14 PROCEDURE — 99214 OFFICE O/P EST MOD 30 MIN: CPT | Mod: ZP | Performed by: INTERNAL MEDICINE

## 2019-01-14 PROCEDURE — G0463 HOSPITAL OUTPT CLINIC VISIT: HCPCS

## 2019-01-14 PROCEDURE — 93005 ELECTROCARDIOGRAM TRACING: CPT | Mod: ZF

## 2019-01-14 PROCEDURE — 85018 HEMOGLOBIN: CPT

## 2019-01-14 PROCEDURE — 93010 ELECTROCARDIOGRAM REPORT: CPT | Mod: ZP | Performed by: INTERNAL MEDICINE

## 2019-01-14 RX ORDER — LISINOPRIL 5 MG/1
5 TABLET ORAL DAILY
Qty: 90 TABLET | Refills: 3 | Status: SHIPPED | OUTPATIENT
Start: 2019-01-14 | End: 2020-08-26

## 2019-01-14 ASSESSMENT — MIFFLIN-ST. JEOR: SCORE: 1592.64

## 2019-01-14 ASSESSMENT — PAIN SCALES - GENERAL: PAINLEVEL: NO PAIN (0)

## 2019-01-14 NOTE — LETTER
1/14/2019      RE: Tad Manning  [address] [address] [id number]       Dear Colleague,    Thank you for the opportunity to participate in the care of your patient, Tad Manning, at the [facility] at [facility]. Please see a copy of my visit note below.    Cardiology Clinic Note  01/14/2019    HPI:   Mr. Tad Manning is a 78-year old male with a past medical history of CLL in remission, CAD s/p CABG in 1995,HFrEF, atrial flutter, PVD s/p iliac artery stents and left lower extremity bypass Hypertension, diabetes, hyperlipidemia, discitis and multple admission for decompensated ICM/HFrEF who presented for follow-up of cardiomyopathy.     He was seen in our clinic in July 2018 and then in [facility] in September.He underwent a LHC that showed moderate disease in his RCA, severe LAD disease and patent grafts and had two drug eluting stents placed in the proximal and mid LAD. RHC showed elevated right and left sided pressures with type II pulmonary hypertension.     Interval History:  Since his last visit, he has undergone a R leg below knee amputation. He is now at home. He denies chest pain, SOB, ALEXIS, palpitations, lightheadedness or dizziness. Exercise capacity is limited. He denies any signs/symptoms of bleeding.    Cardiac meds  - furosemide stopped  - lisinopril stopped  - warfarin  - metoprolol  mg daily  - spironolactone stopped  - atorvastatin 20 mg daily  - clopidogrel 75 mg daily until 3/2019  - Nitro PRN    Current Outpatient Medications   Medication Sig Dispense Refill     Acetaminophen (TYLENOL PO) Take 1,000 mg by mouth every 6 hours       atorvastatin (LIPITOR) 20 MG tablet TAKE 1 TABLET BY MOUTH EVERY DAY 90 tablet 3     cholecalciferol 1000 UNITS TABS Take 1,000 Units by mouth daily 30 tablet      Citalopram Hydrobromide (CELEXA PO) Take 10 mg by mouth daily       clopidogrel (PLAVIX) 75 MG tablet Take 1 tablet  (75 mg) by mouth daily 90 tablet 3     ferrous sulfate (IRON) 325 (65 Fe) MG tablet Take 1 tablet (325 mg) by mouth daily 100 tablet      Furosemide (LASIX PO) Take 20 mg by mouth daily        MELATONIN PO Take 5 mg by mouth daily       METFORMIN HCL PO Take 750 mg by mouth 2 times daily (with meals)       metoprolol succinate ER (TOPROL-XL) 100 MG 24 hr tablet Take 1 tablet (100 mg) by mouth daily 90 tablet 3     nitroglycerin (NITROSTAT) 0.4 MG sublingual tablet For chest pain place 1 tablet under the tongue every 5 minutes for 3 doses. If symptoms persist 5 minutes after 1st dose call 911. 52 tablet      order for DME Equipment being ordered: DH2 shoe 1 Device 0     oxyCODONE IR (ROXICODONE) 5 MG tablet Take 1-2 tablets (5-10 mg) by mouth every 4 hours as needed for pain 20 tablet 0     polyethylene glycol (MIRALAX/GLYCOLAX) Packet Take 1 packet by mouth daily as needed        PROVIDER DOSED MANAGED WARFARIN, COUMADIN, OUTPATIENT Dose based on INR per Primary MD/Nursing Home MD.       sodium chloride (EQ SALINE NASAL SPRAY) 0.65 % nasal spray Spray 1 spray into both nostrils daily as needed for congestion 1 Bottle 1     warfarin (COUMADIN) 5 MG tablet 7.5mg MWF, and 5mg TuThSatSun, or as directed by the Medication Monitoring [facility] at the [facility]. 120 tablet 1     Warfarin Therapy Reminder 1 each continuous prn       hydrocortisone (CORTIZONE-10) 1 % ointment Apply topically 2 times daily as needed for itching       miconazole (MICATIN; MICRO GUARD) 2 % powder Apply topically 3 times daily (Patient not taking: Reported on 1/14/2019)       pantoprazole (PROTONIX) 40 MG EC tablet Take 1 tablet (40 mg) by mouth daily (Patient not taking: Reported on 1/14/2019) 30 tablet        Past Medical History:   Diagnosis Date     Arthritis      ASCVD (arteriosclerotic cardiovascular disease)      BMI 30.0-30.9,adult      BPH (benign prostatic hypertrophy)      Cellulitis      Chronic lymphocytic leukemia of B-cell type not  having achieved remission (H)      Coronary artery disease     triple bypass 1995     Diabetes mellitus (H)      Diabetic polyneuropathy (H)      History of blood transfusion      Hyperlipidaemia      Hypertension      Non-healing ulcer of foot (H)     Right     Noninflammatory pericardial effusion      Osteomyelitis of left foot (H)      PVD (peripheral vascular disease) (H)      Sebaceous cyst        Past Surgical History:   Procedure Laterality Date     AMPUTATE LEG BELOW KNEE Right 10/8/2018    Procedure: AMPUTATE LEG BELOW KNEE;  OPEN RIGHT LOWER LEG AMPUTATION WITH WOUND VAC PLACEMENT    EBL: 50mL;  Surgeon: Amador Vick MD;  Location:  OR     AMPUTATE REVISION STUMP LOWER EXTREMITY Right 10/11/2018    Procedure: AMPUTATE REVISION STUMP LOWER EXTREMITY;  CLOSURE RIGHT BELOW KNEE AMPUTATION;  Surgeon: Amador Vick MD;  Location:  OR     AMPUTATE TOE(S)  1/10/2014    Procedure: AMPUTATE TOE(S);;  Surgeon: Amador Vick MD;  Location:  OR     APPLY WOUND VAC Right 10/8/2018    Procedure: APPLY WOUND VAC;;  Surgeon: Amador Vick MD;  Location:  OR     BONE MARROW BIOPSY, BONE SPECIMEN, NEEDLE/TROCAR  10/19/2012    Procedure: BIOPSY BONE MARROW;  BONE MARROW BIOPSY  (CONSCIOUS SED);  Surgeon: Ramon Quesada MD;  Location:  GI     BYPASS GRAFT FEMOROPOPLITEAL  1/10/2014    Procedure: BYPASS GRAFT FEMOROPOPLITEAL;  Left above knee popliteal to  Below knee popliteal bypass using transverse saphenous vein graft. Left 2nd Toe amputation;  Surgeon: Amador Vick MD;  Location:  OR     CARDIAC SURGERY      angioplasty with stent, triple bypass     EXCISE CYST GENERIC (LOCATION) N/A 2/1/2016    Procedure: EXCISE CYST GENERIC (LOCATION);  Surgeon: Amador Vick MD;  Location:  SD     GRAFT VEIN FROM EXTREMITY (LOCATION)  1/10/2014    Procedure: GRAFT VEIN FROM EXTREMITY (LOCATION);;  Surgeon: Amador Vick MD;  Location:  OR     LAMINECTOMY THORACIC ONE LEVEL  N/A 2017    Procedure: LAMINECTOMY THORACIC ONE LEVEL;  Surgeon: Marcelo Trent MD;  Location: [facility] OR     OPTICAL TRACKING SYSTEM FUSION POSTERIOR SPINE THORACIC THREE+ LEVELS N/A 2017    Procedure: OPTICAL TRACKING SYSTEM FUSION POSTERIOR SPINE THORACIC THREE+ LEVELS;  Surgeon: Marcelo Trent MD;  Location: [facility] OR     TONSILLECTOMY       VASCULAR SURGERY      ptcr legs   T9-10 discitis with overlying epidural abscess  S/p laminectomy / abscess evacuation.  Critical lower limb ischemia , s/p L AK pop to BK pop bypass with rGSV on 1/10/2014  Family History   Problem Relation Age of Onset     Cancer Mother         leukemia       Social History     Tobacco Use     Smoking status: Former Smoker     Packs/day: 2.00     Years: 30.00     Pack years: 60.00     Types: Cigarettes     Last attempt to quit: 1995     Years since quittin.5     Smokeless tobacco: Never Used   Substance Use Topics     Alcohol use: No       Allergies   Allergen Reactions     Blood Transfusion Related (Informational Only) Other (See Comments)     Patient has a history of a clinically significant antibody against RBC antigens.  A delay in compatible RBCs may occur.        Physical Examination:  /70 (BP Location: Left arm, Patient Position: Chair, Cuff Size: Adult Regular)   Pulse 65   Ht 1.829 m (6')   Wt 82.5 kg (181 lb 12.8 oz)   SpO2 98%   BMI 24.66 kg/m    Body mass index is 24.66 kg/m .  Gen: pleasant male in no apparent distress      HEENT: NC/AT, EOMI  Neck: supple, no JVD  Heart: irregularly, irregular, no m/r/g   Lungs: Clear bilaterally, no wheezes or crackles  Abdomen: Soft, nontender, nondistended  Extremities: Right below knee amputation, trace edema LLE, 2+ peripheral pulses  Neuro: alert and oriented, no focal deficits  Skin: warm and dry, no rashes on exposed surfaces 10 AM  Musculoskeletal: R BKA and he has a brace     Laboratory:  Chemistry panel:   Recent Labs   Lab Test 19  1035  11/08/18  10/12/18  0802  10/03/18  0636  09/22/18  0710 09/22/18  0042  03/07/18  0453    137   < > 137   < > 135   < > 134 132*   < > 140   POTASSIUM 4.3 3.6   < > 4.1   < > 3.2*   < > 4.4 4.5   < > 3.7   CHLORIDE 105 105   < > 103   < > 103   < > 101 96   < > 105   CO2 26 22   < > 28   < > 22   < > 25 24   < > 25   ANIONGAP 7  --   --  6   < > 10   < > 8 12   < > 10   * 170*   < > 112*   < > 135*   < > 285* 303*   < > 131*   BUN 23 21   < > 19   < > 30   < > 82* 95*   < > 26   CR 1.03 0.93   < > 1.17   < > 1.28*   < > 1.91* 2.23*   < > 0.93   JUSTINO 9.0 9.0   < > 8.4*   < > 8.0*   < > 8.8 9.7   < > 8.6   MAG  --   --   --   --   --   --   --  1.8  --   --  1.7   GFRESTIMATED 70 >60   < > 61   < > 55*   < > 34* 29*   < > 79   AST  --   --   --   --   --  15  --   --  11   < >  --    ALT  --   --   --   --   --  14  --   --  27   < >  --     < > = values in this interval not displayed.       CBC:   Recent Labs   Lab Test 01/14/19  1202 11/08/18  10/22/18  10/14/18  0757  10/11/18  1657   WBC  --   --   --  4.2  --  6.5  --  9.1   RBC  --   --   --  2.94*  --  2.73*  --  2.62*   HGB 12.6* 9.9*   < > 8.5*   < > 8.2*   < > 7.8*   HCT  --   --   --  27.0*  --  24.2*  --  23.5*   MCV  --   --   --  91.8  --  89  --  90   MCH  --   --   --   --   --  30.0  --  29.8   MCHC  --   --   --   --   --  33.9  --  33.2   RDW  --   --   --  16.5*  --  15.9*  --  16.1*   PLT  --   --   --  188  --  130*  --  152    < > = values in this interval not displayed.       Lipid Panel:  Recent Labs   Lab Test 04/09/18  0951 05/23/14  0745 01/10/14  0923   CHOL 65 134 92   HDL 30* 34* 28*   LDL 23 48 45   TRIG 58 257* 92   CHOLHDLRATIO  --  3.9 3.3       Thyroid:   TSH   Date Value Ref Range Status   10/03/2018 1.11 0.40 - 4.00 mU/L Final     No results found for: T4  Hemoglobin A1C   Date Value Ref Range Status   09/22/2018 9.0 (H) 0 - 5.6 % Final     Comment:     Normal <5.7% Prediabetes 5.7-6.4%  Diabetes 6.5% or higher -  adopted from ADA   consensus guidelines.       INR   Date Value Ref Range Status   01/14/2019 2.30 (H) 0.86 - 1.14 Final         Cardiac data:  ECG today: NSR with first degree AV block, anterolateral TWI          ECG July 2018  Atrial fibrillation with rate of 75, nonspecific T-wave changes seen laterally (seen previously)        ECG 04/18: atrial fibrillation that is well controlled, nonspecific T-wave abnormalities laterally.          RH 3/2018    [facility] 3/2018    Echo 2/18  Interpretation Summary  Severe left ventricular dilation is present.  Biplane LV EF 20%.  Severe diffuse hypokinesis is present.  Mild to moderate right ventricular dilation is present.  Global right ventricular function is moderately to severely reduced.  Dilation of the inferior vena cava is present with abnormal respiratory  variation in diameter.  No pericardial effusion is present.  A left pleural effusion is present.    ECG February 2018  Atrial fibrillation nonspecific ST-T abnormalities    EKG 04/06/17:    NSR Rate approx 125 bpm, LAD, no ischemic changes.    Echo (2/24/2017)  Technically difficult study.The patient's rhythm is atrial fibrillation.  The left ventricle is severely dilated. Left ventricular systolic function is  severely reduced, ejection fraction is estimated at 25-30%. There is severe  global hypokinesis.  Right ventricular systolic function is mildly reduced.  The right ventricular systolic pressure is approximated at 15 mmHg plus the  right atrial pressure. IVC is plethoric and estimated mean RA pressure is 15  mmHg.  No pericardial effusion present.    Assessment/Plan:  1. HFrEF, Stage C, NYHA 3  Severe LV dysfunction EF 20% . Euvolemic on exam today.    2. CAD  s/p CABG L-LAD, SVG-OM1, SVG-D1 in 1995. s/p LHc in 3/2018 and PCI to mLAD. Not on ASA 2/2 already being on plavix and warfarin.  - continue plavix, metoprolol XL, lisinopril, and atorvastatin 20 mg daily    3.  Paroxysmal atrial fibrillation  In sinus rhythm  today  - Continue warfarin, INR goal 2-3  - Continue metoprolol  mg daily    4. Essential Hypertension  Elevated at 162/70 mmHg    5. Dyslipidemia  - Continue atorvastatin 20 mg daily    6. DM Type II  - continue metformin    7. PAD with LE stents and bypass   - Continue plavix (not on ASA 2/2 being on warfarin as well)    8. R BKA    Mr. Manning returns for follow-up.  He has ischemic cardiomyopathy and chronic systolic heart failure with an ejection fraction around 20%. He underwent a coronary angiogram in March 2018 and was noted to have patent grafts and his native  LAD had significant obstructive disease after the LIMA anastomosis which was intervened with placement of drug-eluting stents.  He was hospitalized overnight for diuresis given the elevated filling pressures.    On exam he is euvolemic.  His blood pressure is elevated. He is in sinus rhythm today.  His laboratory data reviewed today shows stable renal function. His lipids are controlled on atorvastatin to 20 mg once daily.     He was previously prescribed advised to digoxin which does not appear to be on his medical regimen currently we will reinstate this.  Digoxin and lisinopril at a low dose of 5 mg/d will be reinstated.   Advise follow-up with me in 3 months with labs.  Clarisse Valdes MD, MS  Staff Cardiologist  Pager: [phone]    Please do not hesitate to contact me if you have any questions/concerns.     Sincerely,     Clarisse Valdes MD

## 2019-01-14 NOTE — PROGRESS NOTES
ANTICOAGULATION FOLLOW-UP CLINIC VISIT    Patient Name:  Tad Manning  Date:  2019  Contact Type:  Telephone    SUBJECTIVE:        OBJECTIVE    INR   Date Value Ref Range Status   2019 2.30 (H) 0.86 - 1.14 Final       ASSESSMENT / PLAN  No question data found.  Anticoagulation Summary  As of 2019    INR goal:   2.0-3.0   TTR:   44.8 % (9.3 mo)   INR used for dosin.30 (2019)   Warfarin maintenance plan:   7.5 mg (5 mg x 1.5) every Mon, Wed, Fri; 5 mg (5 mg x 1) all other days   Full warfarin instructions:   7.5 mg every Mon, Wed, Fri; 5 mg all other days   Weekly warfarin total:   42.5 mg   No change documented:   Vicki Hearn RN   Plan last modified:   Vicki Hearn RN (2018)   Next INR check:   2019   Priority:   INR   Target end date:   Indefinite    Indications    Atrial fibrillation -- on Warfarin [I48.91]  Long-term (current) use of anticoagulants [Z79.01] [Z79.01]             Anticoagulation Episode Summary     INR check location:       Preferred lab:       Send INR reminders to:   [facility]    Comments:   HIPPA Infor returned. OK to  on cell [phone]. OK to  with Seferino Manning [phone]  labs @ [facility] faxed there .  fax: [phone]    Goes by [employer]      Anticoagulation Care Providers     Provider Role Specialty Phone number    Clarisse Valdes MD [facility] Cardiology [phone]            See the Encounter Report to view Anticoagulation Flowsheet and Dosing Calendar (Go to Encounters tab in chart review, and find the Anticoagulation Therapy Visit)    Left message for patient with results and dosing recommendations. Asked patient to call back to report any missed doses, falls, signs and symptoms of bleeding or clotting, any changes in health, medication, or diet. Asked patient to call back with any questions or concerns.     Vicki Hearn RN

## 2019-01-14 NOTE — NURSING NOTE
Chief Complaint   Patient presents with     Follow Up     manage systolic heart failure and atrial fibrillation     Vitals were taken and medications were reconciled. EKG was performed    Karina ROSA  12:20 PM

## 2019-01-14 NOTE — NURSING NOTE
Labs: Patient was given results of the laboratory testing obtained today . Patient demonstrated understanding of this information and agreed to call with further questions or concerns.   Med Reconcile: Reviewed and verified all current medications with the patient. The updated medication list was printed and given to the patient.  New Medication: Patient was educated regarding newly prescribed medication, including discussion of  the indication, administration, side effects, and when to report to MD or RN. Patient demonstrated understanding of this information and agreed to call with further questions or concerns.  Return Appointment: Patient given instructions regarding scheduling next clinic visit. Patient demonstrated understanding of this information and agreed to call with further questions or concerns.  Patient stated he understood all health information given and agreed to call with further questions or concerns.

## 2019-01-14 NOTE — PROGRESS NOTES
Cardiology Clinic Note  01/14/2019    HPI:   Mr. Tad Manning is a 78-year old male with a past medical history of CLL in remission, CAD s/p CABG in 1995,HFrEF, atrial flutter, PVD s/p iliac artery stents and left lower extremity bypass Hypertension, diabetes, hyperlipidemia, discitis and multple admission for decompensated ICM/HFrEF who presented for follow-up of cardiomyopathy.     He was seen in our clinic in July 2018 and then in [facility] in September.He underwent a LHC that showed moderate disease in his RCA, severe LAD disease and patent grafts and had two drug eluting stents placed in the proximal and mid LAD. RHC showed elevated right and left sided pressures with type II pulmonary hypertension.     Interval History:  Since his last visit, he has undergone a R leg below knee amputation. He is now at home. He denies chest pain, SOB, ALEXIS, palpitations, lightheadedness or dizziness. Exercise capacity is limited. He denies any signs/symptoms of bleeding.    ROS:  A complete 10-pt ROS was negative except as above.    Cardiac meds  - furosemide stopped  - lisinopril stopped  - warfarin  - metoprolol  mg daily  - spironolactone stopped  - atorvastatin 20 mg daily  - clopidogrel 75 mg daily until 3/2019  - Nitro PRN    Current Outpatient Medications   Medication Sig Dispense Refill     Acetaminophen (TYLENOL PO) Take 1,000 mg by mouth every 6 hours       atorvastatin (LIPITOR) 20 MG tablet TAKE 1 TABLET BY MOUTH EVERY DAY 90 tablet 3     cholecalciferol 1000 UNITS TABS Take 1,000 Units by mouth daily 30 tablet      Citalopram Hydrobromide (CELEXA PO) Take 10 mg by mouth daily       clopidogrel (PLAVIX) 75 MG tablet Take 1 tablet (75 mg) by mouth daily 90 tablet 3     ferrous sulfate (IRON) 325 (65 Fe) MG tablet Take 1 tablet (325 mg) by mouth daily 100 tablet      Furosemide (LASIX PO) Take 20 mg by mouth daily        MELATONIN PO Take 5 mg by mouth daily       METFORMIN HCL PO Take 750 mg by  mouth 2 times daily (with meals)       metoprolol succinate ER (TOPROL-XL) 100 MG 24 hr tablet Take 1 tablet (100 mg) by mouth daily 90 tablet 3     nitroglycerin (NITROSTAT) 0.4 MG sublingual tablet For chest pain place 1 tablet under the tongue every 5 minutes for 3 doses. If symptoms persist 5 minutes after 1st dose call 911. 88 tablet      order for DME Equipment being ordered: DH2 shoe 1 Device 0     oxyCODONE IR (ROXICODONE) 5 MG tablet Take 1-2 tablets (5-10 mg) by mouth every 4 hours as needed for pain 20 tablet 0     polyethylene glycol (MIRALAX/GLYCOLAX) Packet Take 1 packet by mouth daily as needed        PROVIDER DOSED MANAGED WARFARIN, COUMADIN, OUTPATIENT Dose based on INR per Primary MD/Nursing Home MD.       sodium chloride (EQ SALINE NASAL SPRAY) 0.65 % nasal spray Spray 1 spray into both nostrils daily as needed for congestion 1 Bottle 1     warfarin (COUMADIN) 5 MG tablet 7.5mg MWF, and 5mg TuThSatSun, or as directed by the Medication Monitoring Clinic at the [facility]. 120 tablet 1     Warfarin Therapy Reminder 1 each continuous prn       hydrocortisone (CORTIZONE-10) 1 % ointment Apply topically 2 times daily as needed for itching       miconazole (MICATIN; MICRO GUARD) 2 % powder Apply topically 3 times daily (Patient not taking: Reported on 1/14/2019)       pantoprazole (PROTONIX) 40 MG EC tablet Take 1 tablet (40 mg) by mouth daily (Patient not taking: Reported on 1/14/2019) 30 tablet        Past Medical History:   Diagnosis Date     Arthritis      ASCVD (arteriosclerotic cardiovascular disease)      BMI 30.0-30.9,adult      BPH (benign prostatic hypertrophy)      Cellulitis      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)      Coronary artery disease     triple bypass 1995     Diabetes mellitus (H)      Diabetic polyneuropathy (H)      History of blood transfusion      Hyperlipidaemia      Hypertension      Non-healing ulcer of foot (H)     Right      Noninflammatory pericardial effusion      Osteomyelitis of left foot (H)      PVD (peripheral vascular disease) (H)      Sebaceous cyst        Past Surgical History:   Procedure Laterality Date     AMPUTATE LEG BELOW KNEE Right 10/8/2018    Procedure: AMPUTATE LEG BELOW KNEE;  OPEN RIGHT LOWER LEG AMPUTATION WITH WOUND VAC PLACEMENT    EBL: 50mL;  Surgeon: Amador Vick MD;  Location:  OR     AMPUTATE REVISION STUMP LOWER EXTREMITY Right 10/11/2018    Procedure: AMPUTATE REVISION STUMP LOWER EXTREMITY;  CLOSURE RIGHT BELOW KNEE AMPUTATION;  Surgeon: Amador Vick MD;  Location:  OR     AMPUTATE TOE(S)  1/10/2014    Procedure: AMPUTATE TOE(S);;  Surgeon: Amadro Vick MD;  Location:  OR     APPLY WOUND VAC Right 10/8/2018    Procedure: APPLY WOUND VAC;;  Surgeon: Amador Vick MD;  Location:  OR     BONE MARROW BIOPSY, BONE SPECIMEN, NEEDLE/TROCAR  10/19/2012    Procedure: BIOPSY BONE MARROW;  BONE MARROW BIOPSY  (CONSCIOUS SED);  Surgeon: Ramon Quesada MD;  Location:  GI     BYPASS GRAFT FEMOROPOPLITEAL  1/10/2014    Procedure: BYPASS GRAFT FEMOROPOPLITEAL;  Left above knee popliteal to  Below knee popliteal bypass using transverse saphenous vein graft. Left 2nd Toe amputation;  Surgeon: Amador Vick MD;  Location:  OR     CARDIAC SURGERY      angioplasty with stent, triple bypass     EXCISE CYST GENERIC (LOCATION) N/A 2/1/2016    Procedure: EXCISE CYST GENERIC (LOCATION);  Surgeon: Amador Vick MD;  Location:  SD     GRAFT VEIN FROM EXTREMITY (LOCATION)  1/10/2014    Procedure: GRAFT VEIN FROM EXTREMITY (LOCATION);;  Surgeon: Amador Vick MD;  Location:  OR     LAMINECTOMY THORACIC ONE LEVEL N/A 2/8/2017    Procedure: LAMINECTOMY THORACIC ONE LEVEL;  Surgeon: Marcelo Trent MD;  Location:  OR     OPTICAL TRACKING SYSTEM FUSION POSTERIOR SPINE THORACIC THREE+ LEVELS N/A 2/12/2017    Procedure: OPTICAL TRACKING SYSTEM FUSION POSTERIOR SPINE  THORACIC THREE+ LEVELS;  Surgeon: Marcelo Trent MD;  Location: [facility] OR     TONSILLECTOMY       VASCULAR SURGERY      ptcr legs   T9-10 discitis with overlying epidural abscess  S/p laminectomy / abscess evacuation.  Critical lower limb ischemia , s/p L AK pop to BK pop bypass with rGSV on 1/10/2014  Family History   Problem Relation Age of Onset     Cancer Mother         leukemia       Social History     Tobacco Use     Smoking status: Former Smoker     Packs/day: 2.00     Years: 30.00     Pack years: 60.00     Types: Cigarettes     Last attempt to quit: 1995     Years since quittin.5     Smokeless tobacco: Never Used   Substance Use Topics     Alcohol use: No       Allergies   Allergen Reactions     Blood Transfusion Related (Informational Only) Other (See Comments)     Patient has a history of a clinically significant antibody against RBC antigens.  A delay in compatible RBCs may occur.        Physical Examination:  /70 (BP Location: Left arm, Patient Position: Chair, Cuff Size: Adult Regular)   Pulse 65   Ht 1.829 m (6')   Wt 82.5 kg (181 lb 12.8 oz)   SpO2 98%   BMI 24.66 kg/m    Body mass index is 24.66 kg/m .  Gen: pleasant male in no apparent distress      HEENT: NC/AT, EOMI  Neck: supple, no JVD  Heart: irregularly, irregular, no m/r/g   Lungs: Clear bilaterally, no wheezes or crackles  Abdomen: Soft, nontender, nondistended  Extremities: Right below knee amputation, trace edema LLE, 2+ peripheral pulses  Neuro: alert and oriented, no focal deficits  Skin: warm and dry, no rashes on exposed surfaces 10 AM  Musculoskeletal: R BKA and he has a brace     Laboratory:  Chemistry panel:   Recent Labs   Lab Test 19  1035 11/08/18  10/12/18  0802  10/03/18  0636  18  0710 18  0042  18  0453    137   < > 137   < > 135   < > 134 132*   < > 140   POTASSIUM 4.3 3.6   < > 4.1   < > 3.2*   < > 4.4 4.5   < > 3.7   CHLORIDE 105 105   < > 103   < > 103   < > 101 96   <  > 105   CO2 26 22   < > 28   < > 22   < > 25 24   < > 25   ANIONGAP 7  --   --  6   < > 10   < > 8 12   < > 10   * 170*   < > 112*   < > 135*   < > 285* 303*   < > 131*   BUN 23 21   < > 19   < > 30   < > 82* 95*   < > 26   CR 1.03 0.93   < > 1.17   < > 1.28*   < > 1.91* 2.23*   < > 0.93   JUSTINO 9.0 9.0   < > 8.4*   < > 8.0*   < > 8.8 9.7   < > 8.6   MAG  --   --   --   --   --   --   --  1.8  --   --  1.7   GFRESTIMATED 70 >60   < > 61   < > 55*   < > 34* 29*   < > 79   AST  --   --   --   --   --  15  --   --  11   < >  --    ALT  --   --   --   --   --  14  --   --  27   < >  --     < > = values in this interval not displayed.       CBC:   Recent Labs   Lab Test 01/14/19  1202 11/08/18  10/22/18  10/14/18  0757  10/11/18  1657   WBC  --   --   --  4.2  --  6.5  --  9.1   RBC  --   --   --  2.94*  --  2.73*  --  2.62*   HGB 12.6* 9.9*   < > 8.5*   < > 8.2*   < > 7.8*   HCT  --   --   --  27.0*  --  24.2*  --  23.5*   MCV  --   --   --  91.8  --  89  --  90   MCH  --   --   --   --   --  30.0  --  29.8   MCHC  --   --   --   --   --  33.9  --  33.2   RDW  --   --   --  16.5*  --  15.9*  --  16.1*   PLT  --   --   --  188  --  130*  --  152    < > = values in this interval not displayed.       Lipid Panel:  Recent Labs   Lab Test 04/09/18  0951 05/23/14  0745 01/10/14  0923   CHOL 65 134 92   HDL 30* 34* 28*   LDL 23 48 45   TRIG 58 257* 92   CHOLHDLRATIO  --  3.9 3.3       Thyroid:   TSH   Date Value Ref Range Status   10/03/2018 1.11 0.40 - 4.00 mU/L Final     No results found for: T4  Hemoglobin A1C   Date Value Ref Range Status   09/22/2018 9.0 (H) 0 - 5.6 % Final     Comment:     Normal <5.7% Prediabetes 5.7-6.4%  Diabetes 6.5% or higher - adopted from ADA   consensus guidelines.       INR   Date Value Ref Range Status   01/14/2019 2.30 (H) 0.86 - 1.14 Final         Cardiac data:  ECG today: NSR with first degree AV block, anterolateral TWI          ECG July 2018  Atrial fibrillation with rate of 75,  nonspecific T-wave changes seen laterally (seen previously)        ECG 04/18: atrial fibrillation that is well controlled, nonspecific T-wave abnormalities laterally.          RHC 3/2018    [facility] 3/2018    Echo 2/18  Interpretation Summary  Severe left ventricular dilation is present.  Biplane LV EF 20%.  Severe diffuse hypokinesis is present.  Mild to moderate right ventricular dilation is present.  Global right ventricular function is moderately to severely reduced.  Dilation of the inferior vena cava is present with abnormal respiratory  variation in diameter.  No pericardial effusion is present.  A left pleural effusion is present.    ECG February 2018  Atrial fibrillation nonspecific ST-T abnormalities    EKG 04/06/17:    NSR Rate approx 125 bpm, LAD, no ischemic changes.    Echo (2/24/2017)  Technically difficult study.The patient's rhythm is atrial fibrillation.  The left ventricle is severely dilated. Left ventricular systolic function is  severely reduced, ejection fraction is estimated at 25-30%. There is severe  global hypokinesis.  Right ventricular systolic function is mildly reduced.  The right ventricular systolic pressure is approximated at 15 mmHg plus the  right atrial pressure. IVC is plethoric and estimated mean RA pressure is 15  mmHg.  No pericardial effusion present.    Assessment/Plan:  1. HFrEF, Stage C, NYHA 3  Severe LV dysfunction EF 20% . Euvolemic on exam today.    2. CAD  s/p CABG L-LAD, SVG-OM1, SVG-D1 in 1995. s/p LHc in 3/2018 and PCI to mLAD. Not on ASA 2/2 already being on plavix and warfarin.  - continue plavix, metoprolol XL, lisinopril, and atorvastatin 20 mg daily    3.  Paroxysmal atrial fibrillation  In sinus rhythm today  - Continue warfarin, INR goal 2-3  - Continue metoprolol  mg daily    4. Essential Hypertension  Elevated at 162/70 mmHg    5. Dyslipidemia  - Continue atorvastatin 20 mg daily    6. DM Type II  - continue metformin    7. PAD with LE stents and bypass    - Continue plavix (not on ASA 2/2 being on warfarin as well)    8. R BKA    Mr. Manning returns for follow-up.  He has ischemic cardiomyopathy and chronic systolic heart failure with an ejection fraction around 20%. He underwent a coronary angiogram in March 2018 and was noted to have patent grafts and his native  LAD had significant obstructive disease after the LIMA anastomosis which was intervened with placement of drug-eluting stents.  He was hospitalized overnight for diuresis given the elevated filling pressures.    On exam he is euvolemic.  His blood pressure is elevated. He is in sinus rhythm today.  His laboratory data reviewed today shows stable renal function. His lipids are controlled on atorvastatin to 20 mg once daily.     He was previously prescribed advised to digoxin which does not appear to be on his medical regimen currently we will reinstate this.  Digoxin and lisinopril at a low dose of 5 mg/d will be reinstated.   Advise follow-up with me in 3 months with labs.  Clarisse Valdes MD, MS  Staff Cardiologist  Pager: [phone]

## 2019-01-14 NOTE — PATIENT INSTRUCTIONS
Patient Instructions:  It was a pleasure to see you in the cardiology clinic today.      If you have any questions, call  Jeaneth Montero RN, at [phone].  Press Option #1 for the [facility], and then press Option #3 for nursing.  We are encouraging the use of MyChart to communicate with your HealthCare Provider    Note the new medications: Lisinopril 5 mg by mouth daily  Stop the following medications: none    The results from today include:   Results for PETER BUTLER (MRN [id number]) as of 1/14/2019 13:13   Ref. Range 1/14/2019 12:02   Hemoglobin Latest Ref Range: 13.3 - 17.7 g/dL 12.6 (L)   INR Latest Ref Range: 0.86 - 1.14  2.30 (H)     Please follow up with Dr. Clarisse Valdes in three months      If you have an urgent need after hours (8:00 am to 4:30 pm) please call [phone] and ask for the cardiology fellow on call.

## 2019-01-15 LAB — INTERPRETATION ECG - MUSE: NORMAL

## 2019-01-16 ENCOUNTER — TELEPHONE (OUTPATIENT)
Dept: CARDIOLOGY | Facility: CLINIC | Age: 79
End: 2019-01-16

## 2019-01-16 DIAGNOSIS — I50.22 CHRONIC SYSTOLIC HEART FAILURE (H): Primary | ICD-10-CM

## 2019-01-16 RX ORDER — DIGOXIN 125 MCG
125 TABLET ORAL DAILY
Qty: 90 TABLET | Refills: 3 | COMMUNITY
Start: 2019-01-16 | End: 2023-01-01

## 2019-01-16 NOTE — TELEPHONE ENCOUNTER
Called patient and he confirmed that he was on Digoxin 0.125 mg by mouth daily. Med lis reconciled.

## 2019-01-22 ENCOUNTER — DOCUMENTATION ONLY (OUTPATIENT)
Dept: ORTHOPEDICS | Facility: CLINIC | Age: 79
End: 2019-01-22

## 2019-01-22 NOTE — PROGRESS NOTES
S: Pt seen at Main lab with female relative and eager to try his 2nd test socket. He reports that it feels good and that no bones are being impinged upon while in the socket as he stands. O: I see he fit in today with 0 ply sock. A: We will wait for the Required documentation PT notes signed by his MD prior to delivery of the prosthesis. P: We will set up the delivery appointment at Hima's request.   Electronically signed Gene Thompson CPO, LPO.

## 2019-01-29 ENCOUNTER — ANTICOAGULATION THERAPY VISIT (OUTPATIENT)
Dept: ANTICOAGULATION | Facility: CLINIC | Age: 79
End: 2019-01-29

## 2019-01-29 DIAGNOSIS — Z79.01 LONG TERM CURRENT USE OF ANTICOAGULANT THERAPY: ICD-10-CM

## 2019-01-29 DIAGNOSIS — I48.20 CHRONIC ATRIAL FIBRILLATION (H): ICD-10-CM

## 2019-01-29 DIAGNOSIS — I48.91 ATRIAL FIBRILLATION, UNSPECIFIED TYPE (H): ICD-10-CM

## 2019-01-29 LAB — INR BLD: 3.4 (ref 0.86–1.14)

## 2019-01-29 PROCEDURE — 85610 PROTHROMBIN TIME: CPT | Mod: QW | Performed by: INTERNAL MEDICINE

## 2019-01-29 PROCEDURE — 36416 COLLJ CAPILLARY BLOOD SPEC: CPT | Performed by: INTERNAL MEDICINE

## 2019-01-29 NOTE — PROGRESS NOTES
ANTICOAGULATION FOLLOW-UP CLINIC VISIT    Patient Name:  Tad Manning  Date:  1/29/2019  Contact Type:  Telephone    SUBJECTIVE:     Patient Findings     Comments:   Patient reports that he isn't eating as many veggies as usual.            OBJECTIVE    INR Point of Care   Date Value Ref Range Status   01/29/2019 3.4 (H) 0.86 - 1.14 Final     Comment:     This test is intended for monitoring Coumadin therapy.  Results are not   accurate in patients with prolonged INR due to factor deficiency.         ASSESSMENT / PLAN  No question data found.  Anticoagulation Summary  As of 1/29/2019    INR goal:   2.0-3.0   TTR:   45.8 % (9.8 mo)   INR used for dosing:   3.4! (1/29/2019)   Warfarin maintenance plan:   7.5 mg (5 mg x 1.5) every Mon, Wed, Fri; 5 mg (5 mg x 1) all other days   Full warfarin instructions:   1/29: 2.5 mg; Otherwise 7.5 mg every Mon, Wed, Fri; 5 mg all other days   Weekly warfarin total:   42.5 mg   Plan last modified:   Vicki Hearn RN (7/31/2018)   Next INR check:   2/12/2019   Priority:   INR   Target end date:   Indefinite    Indications    Atrial fibrillation -- on Warfarin [I48.91]  Long-term (current) use of anticoagulants [Z79.01] [Z79.01]             Anticoagulation Episode Summary     INR check location:       Preferred lab:       Send INR reminders to:   [facility]    Comments:   HIPPA Infor returned. OK to  on cell [phone]. OK to  with Seferino Manning [phone]  labs @ [facility] [place] faxed there 2/16.  fax: [phone]    Goes by Hima      Anticoagulation Care Providers     Provider Role Specialty Phone number    Clarisse Valdes MD Responsible Cardiology [phone]            See the Encounter Report to view Anticoagulation Flowsheet and Dosing Calendar (Go to Encounters tab in chart review, and find the Anticoagulation Therapy Visit)    Spoke with patient.     Vicki Hearn RN

## 2019-02-05 ENCOUNTER — ANTICOAGULATION THERAPY VISIT (OUTPATIENT)
Dept: ANTICOAGULATION | Facility: CLINIC | Age: 79
End: 2019-02-05

## 2019-02-05 DIAGNOSIS — I48.20 CHRONIC ATRIAL FIBRILLATION (H): ICD-10-CM

## 2019-02-05 DIAGNOSIS — I48.91 ATRIAL FIBRILLATION, UNSPECIFIED TYPE (H): ICD-10-CM

## 2019-02-05 DIAGNOSIS — Z79.01 LONG TERM CURRENT USE OF ANTICOAGULANT THERAPY: ICD-10-CM

## 2019-02-05 LAB — INR BLD: 2.6 (ref 0.86–1.14)

## 2019-02-05 PROCEDURE — 85610 PROTHROMBIN TIME: CPT | Mod: QW | Performed by: INTERNAL MEDICINE

## 2019-02-05 PROCEDURE — 36416 COLLJ CAPILLARY BLOOD SPEC: CPT | Performed by: INTERNAL MEDICINE

## 2019-02-05 NOTE — PROGRESS NOTES
ANTICOAGULATION FOLLOW-UP CLINIC VISIT    Patient Name:  Tad Manning  Date:  2019  Contact Type:  Telephone    SUBJECTIVE:     Patient Findings     Comments:   Hima reports no changes in health, diet, medications.           OBJECTIVE    INR Point of Care   Date Value Ref Range Status   2019 2.6 (H) 0.86 - 1.14 Final     Comment:     This test is intended for monitoring Coumadin therapy.  Results are not   accurate in patients with prolonged INR due to factor deficiency.         ASSESSMENT / PLAN  INR assessment THER    Recheck INR In: 2 WEEKS    INR Location Clinic      Anticoagulation Summary  As of 2019    INR goal:   2.0-3.0   TTR:   45.9 % (10.1 mo)   INR used for dosin.6 (2019)   Warfarin maintenance plan:   7.5 mg (5 mg x 1.5) every Mon, Wed, Fri; 5 mg (5 mg x 1) all other days   Full warfarin instructions:   7.5 mg every Mon, Wed, Fri; 5 mg all other days   Weekly warfarin total:   42.5 mg   No change documented:   Yoselin Hunter RN   Plan last modified:   Vicki Hearn RN (2018)   Next INR check:   2019   Priority:   INR   Target end date:   Indefinite    Indications    Atrial fibrillation -- on Warfarin [I48.91]  Long-term (current) use of anticoagulants [Z79.01] [Z79.01]             Anticoagulation Episode Summary     INR check location:       Preferred lab:       Send INR reminders to:   [facility]    Comments:   HIPPA Infor returned. OK to  on cell [phone]. OK to  with Seferino Manning [phone]  [place] [facility] [place] .  fax: [phone]    Goes by Hima      Anticoagulation Care Providers     Provider Role Specialty Phone number    Clarisse Valdes MD Responsible Cardiology [phone]            See the Encounter Report to view Anticoagulation Flowsheet and Dosing Calendar (Go to Encounters tab in chart review, and find the Anticoagulation Therapy Visit)    Spoke with Hima.  He reports no changes in health,diet,  medications.      Yoselin Hunter RN

## 2019-02-12 ENCOUNTER — HOSPITAL ENCOUNTER (OUTPATIENT)
Dept: PHYSICAL THERAPY | Facility: CLINIC | Age: 79
Setting detail: THERAPIES SERIES
End: 2019-02-12
Attending: SURGERY
Payer: MEDICARE

## 2019-02-12 PROCEDURE — 97535 SELF CARE MNGMENT TRAINING: CPT | Mod: GP | Performed by: PHYSICAL THERAPIST

## 2019-02-12 PROCEDURE — 97162 PT EVAL MOD COMPLEX 30 MIN: CPT | Mod: GP | Performed by: PHYSICAL THERAPIST

## 2019-02-12 NOTE — DISCHARGE INSTRUCTIONS
Bathroom  - consider moving the bench into the shower  - Do a couple dry runs with Macki  - Consider removing the shower door and installing a curtain with a tension kraig      [address]  [place] [address]  [phone]  Call them Thurs if they haven't called you. You should start with them as soon as they are able to start you!        Ellen Naranjo DPT, NCS   Physical Therapist  [facility]  [address]  [address] [place] [address]   [email or web address]  [email or web address]  Office: [phone]

## 2019-02-12 NOTE — PROGRESS NOTES
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 Underwent R BKA by Dr Vick on 10/8/18, with secondary closure surgery on 10/11/18. Pt went from hosptial to TCU to home with homecare services. Discharged from homecare about 2 weeks ago. Checking BGs 1x per day - tries to check it before breakfast - 222 at noon yesterday. Laminectomy T9-10 Feb 2017. L toe amputated due to Diabetes 2014, 6 R eye operations, lots of stents. Complexity: unstable BGs per pt report, HTN, deconditioing.   Current Community Support Meals on wheels;Family/friend caregiver  (Patrice (friend) - rides, shopping, )   Patient role/Employment history Disabled   Living environment Apartment/condo  (3rd floor, uses elevator)   Current Assistive Devices Four Wheeled Walker;Front Wheeled Walker  (transfer w/c)   ADL Devices Wall Grab Bar;Extended Tub Bench   Patient/Family Goals Statement I want to be able to drive and walk to/from the grocery store in good weather.   General Information Comments Can do own laundry. Had homecare PT/OT/HHA/RN. Sets up own meds, no more than a few hours late.  Exercise: doing supine and seated post-amputee ex from homecare. Prior to this would amb with 4WW community distances (grocery is about 5 blocks away).   Fall Risk Screen   Fall screen completed by PT   Have you fallen 2 or more times in the past year? No   Have you fallen and had an injury in the past year? No   Is patient a fall risk? Yes   Fall screen comments A few close calls at home, but  "has not actually fallen.   Pain   Pain location Strain to L thigh and R arm from overuse. No residual limb pain. Gets some residual sensations but no pain.    Vitals Signs   Heart Rate 72   Blood Pressure 159/63   Cognitive Status Examination   Orientation orientation to person, place and time   Level of Consciousness alert   Follows Commands and Answers Questions 100% of the time;able to follow multistep instructions   Personal Safety and Judgment intact   Memory intact   Integumentary   Integumentary Comments Limb shaping well, wearing  sock. Incision at end of limb is well healed, no open areas. Does have some distal edema that is non-tender.   Posture   Posture Comments Standing with mild increased trunk/hip flex. Flat lumbar spine.   Range of Motion (ROM)   ROM Comment R knee -7-130*. R hip full ROM without pain.   Strength   Strength Comments B UEs and LEs grossly 4/5 throughout.   Bed Mobility   Bed Mobility Comments mod I, mild increased time.   Transfer Skills   Transfer Comments Stand pivot transfer without AD, with heavy UE use, and pivoting on L LE.   Locomotion   Wheel Chair Mobility Comments mod I with his manual w/c at home, slower with transport chair that he is in today.   Gait   Gait Comments Pt able to use UEs to effectively push up and amb down length of // bars (12') with close SBA. He declined attempt at same with WW.   Balance   Balance Comments Pt able to stand without UE support 5 secs in // bars on L LE only. mod I sitting balance including functional reach to floor forward and laterally at least 4\".   Sensory Examination   Sensory Perception Comments Pt notes intermittent tingling in L foot and B hands - neuropathy. Not formally tested today.   Planned Therapy Interventions   Planned Therapy Interventions balance training;gait training;ROM;strengthening;stretching;transfer training   Clinical Impression   Criteria for Skilled Therapeutic Interventions Met yes, treatment indicated "   PT Diagnosis Deconditioning, impaired gait   Influenced by the following impairments New R BKA (10/2018), decreased activity tolerance, neuropathy L foot and B hands.    Functional limitations due to impairments Impaired transfers and amb.   Clinical Presentation Evolving/Changing   Clinical Presentation Rationale Pt will be attaining new prosthesis, and will need to learn how to access his home and community again while ambulating.   Clinical Decision Making (Complexity) Moderate complexity   Therapy Frequency 1 time/week   Predicted Duration of Therapy Intervention (days/wks) 90 days   Risk & Benefits of therapy have been explained Yes   Patient, Family & other staff in agreement with plan of care Yes   Goal 1   Goal Identifier Household amb   Goal Description Pt demo ability to tolerate his prothesis and ambulate household distances with 4WW mod I.   Target Date 05/12/19   Goal 2   Goal Identifier community amb   Goal Description Pt demo improved endurance via ability to amb at least 500' with 4WW in 6MWT for improved community accessibility.   Target Date 05/12/19   Goal 3   Goal Identifier Stairs   Goal Description Pt demo ability to climb at least 6 stairs with 1 rail and AAD for community accessibility.   Target Date 05/12/19   Goal 4   Goal Identifier Haircut   Goal Description Pt report ability to walk with his 4WW ~3.5 blocks to go get his haircut.   Target Date 05/12/19   Goal 5   Goal Identifier Limb cares   Goal Description Pt demo understanding of limb care including skin checks and appropriate use of socks for socket fit.   Target Date 05/12/19   Total Evaluation Time   PT Rogelio, Moderate Complexity Minutes (13620) 45     Ellen Naranjo DPT, NCS   Physical Therapist  [facility]  [address]  [address] [place] [address]   [email or web address]  [email or web address]  Office: [phone]   Pager:[phone]  Fax: [phone]

## 2019-02-12 NOTE — IP AVS SNAPSHOT
MRN:[id number]                      After Visit Summary   2/12/2019    Tad Manning    MRN: [id number]           Visit Information        Provider Department      2/12/2019  1:00 PM Chelsea Naranjo, PT [facility], Physical Therapy - Outpatient        Your next 10 appointments already scheduled    Mar 14, 2019 11:00 AM CDT  [facility] Enzo [facility]onso DPM  [facility] [address] [facility] [address]  [phone]   Apr 01, 2019 10:30 AM CDT  Lab with  LAB  [facility] ([facility]) [address] [place] [address]  [address]st [facility] [address]  [phone]   Apr 01, 2019 11:00 AM CDT  (Arrive by 10:45 AM)  Return Visit with Clarisse Valdes MD  [facility] ([facility]) [address] [place] [address]  [address] [place] [id number] [phone]   Jul 15, 2019 11:00 AM CDT  Lab with [employer] [facility]  [facility] ([facility]) [address] [place] [id number] [phone]   Jul 15, 2019 11:30 AM CDT  (Arrive by 11:15 AM)  Return Visit with Clarisse Valdes MD  [facility] ([facility]) [address] [place] [address]  [address] [place] [id number] [phone]        Further instructions from your care team       Bathroom  - consider moving the bench into the shower  - Do a couple dry runs with Macki  - Consider removing the shower door and installing a curtain with a tension kraig      [facility]  [address] [place] [address], [address]  [place] [address]  [phone]  Call them Thurs if they haven't called you. You should start with them as soon as they are able to start you!        Ellen Naranjo, ROSARIO, [facility]   Physical Therapist  [facility]  [address]  [address] [place] [address]   [email or web address]  [email or web address]  Office: [phone]        [facility]  Information    [employer] gives you secure access to your electronic health record. If you see a primary care provider, you can also send messages to your care team and make appointments. If you have questions, please call your primary care clinic.  If you do not have a primary care provider, please call [phone] and they will assist you.       Care EveryWhere ID    This is your Care EveryWhere ID. This could be used by other organizations to access your [facility] medical records  [id number]       Equal Access to Services    STUART ELLISON : Hadii liana ochoao Ayanna, waameenada lumiriam, qakelley kaalmajaki fonseca, deandre del cid. So [facility] [phone].    ATENCIÓN: Si habla español, tiene a noe disposición servicios gratuitos de asistencia lingüística. Llame al [phone].    We comply with applicable federal civil rights laws and [place] laws. We do not discriminate on the basis of race, color, national origin, age, disability, sex, sexual orientation, or gender identity.

## 2019-02-12 NOTE — PROGRESS NOTES
Prosthesis letter of medical necessity    Date: 19    Patient's Last Name: Nigel  First Name: Tad  Diagnosis: Below knee amputation right  : 42  Height: 6'  Weight: 180lbs      History of Amputation  See PT eval dated 19.      Functional Limitations  See PT eval dated 19.       Functional Capabilities  See PT eval dated 19.       Functional level 2: The patient has the ability or potential for ambulation with the ability to traverse low level environmental barriers such as curbs, stairs or uneven surfaces. Typical of the limited community ambulator.    Condition of Limb  (skin irritation, breakdown, infection, limb volume changes, swelling, weight fluctuation, muscle change, and healing issues)  Pt utilizing  sock with good shaping of the limb. Incision is well healed with no open areas. Pt does have some mild distal edema that is non-painful.      Condition of Current Prosthesis  This is the pts first prosthesis.       Past Experience with Prosthesis  (What has been tried in the past? Describe any problems experienced)  This is the pts first prosthesis.       Patient's Motivation to Use New Prosthesis  Pt is highly motivated to attain and use a new prosthesis. He wants to be able to drive and walk around his community with his 4-wheeled walker as was his prior level of function.      Electronically signed by:  Ellen Naranjo DPT, NCS   Physical Therapist  [facility]  [address]  [address] [place] [address]   [email or web address]  [email or web address]  Office: [phone]   Pager:[phone]  Fax: [phone]                                I CERTIFY THE NEED FOR THESE SERVICES FURNISHED UNDER THIS PLAN OF TREATMENT AND WHILE UNDER MY CARE     (Physician co-signature of this document indicates review and certification of the therapy plan).                           Co-signing physician: Dr Amador Vick

## 2019-02-13 NOTE — PROGRESS NOTES
[facility]        OUTPATIENT PHYSICAL THERAPY FUNCTIONAL EVALUATION  PLAN OF TREATMENT FOR OUTPATIENT REHABILITATION  (COMPLETE FOR INITIAL CLAIMS ONLY)  Patient's Last Name, First Name, M.I.  YOB: 1940  Tad Manning     Provider's Name   [facility]   Medical Record No.  [id number]     Start of Care Date:  02/12/19   Onset Date:  10/08/18   Type:     _X__PT   ____OT  ____SLP Medical Diagnosis:   Below the knee amputation, right     PT Diagnosis:  Deconditioning, impaired gait Visits from SOC:  1                              __________________________________________________________________________________  Plan of Treatment/Functional Goals:  balance training, gait training, ROM, strengthening, stretching, transfer training           GOALS  Household amb  Pt demo ability to tolerate his prothesis and ambulate household distances with 4WW mod I.  05/12/19    community amb  Pt demo improved endurance via ability to amb at least 500' with 4WW in 6MWT for improved community accessibility.  05/12/19    Stairs  Pt demo ability to climb at least 6 stairs with 1 rail and AAD for community accessibility.  05/12/19    Haircut  Pt report ability to walk with his 4WW ~3.5 blocks to go get his haircut.  05/12/19    Limb cares  Pt demo understanding of limb care including skin checks and appropriate use of socks for socket fit.  05/12/19     Therapy Frequency:  1 time/week   Predicted Duration of Therapy Intervention:  90 days    Chelsea Naranjo, PT                                    I CERTIFY THE NEED FOR THESE SERVICES FURNISHED UNDER        THIS PLAN OF TREATMENT AND WHILE UNDER MY CARE     (Physician co-signature of this document indicates review and certification of the therapy plan).                Certification Date From:    2/12/19  Certification Date To:    5/12/19    Referring Provider:  Dr Amador Vick    Initial Assessment  See Epic Evaluation- Start of Care Date: 02/12/19

## 2019-02-19 ENCOUNTER — ANTICOAGULATION THERAPY VISIT (OUTPATIENT)
Dept: ANTICOAGULATION | Facility: CLINIC | Age: 79
End: 2019-02-19

## 2019-02-19 DIAGNOSIS — I48.20 CHRONIC ATRIAL FIBRILLATION (H): ICD-10-CM

## 2019-02-19 DIAGNOSIS — I48.91 ATRIAL FIBRILLATION, UNSPECIFIED TYPE (H): ICD-10-CM

## 2019-02-19 DIAGNOSIS — Z79.01 LONG TERM CURRENT USE OF ANTICOAGULANT THERAPY: ICD-10-CM

## 2019-02-19 LAB — INR BLD: 3.1 (ref 0.86–1.14)

## 2019-02-19 PROCEDURE — 36416 COLLJ CAPILLARY BLOOD SPEC: CPT | Performed by: INTERNAL MEDICINE

## 2019-02-19 PROCEDURE — 85610 PROTHROMBIN TIME: CPT | Mod: QW | Performed by: INTERNAL MEDICINE

## 2019-02-19 NOTE — PROGRESS NOTES
ANTICOAGULATION FOLLOW-UP CLINIC VISIT    Patient Name:  Tad Manning  Date:  2/19/2019  Contact Type:  Telephone    SUBJECTIVE:        OBJECTIVE    INR Point of Care   Date Value Ref Range Status   02/19/2019 3.1 (H) 0.86 - 1.14 Final     Comment:     This test is intended for monitoring Coumadin therapy.  Results are not   accurate in patients with prolonged INR due to factor deficiency.         ASSESSMENT / PLAN  INR assessment THER    Recheck INR In: 2 WEEKS    INR Location Clinic      Anticoagulation Summary  As of 2/19/2019    INR goal:   2.0-3.0   TTR:   47.4 % (10.5 mo)   INR used for dosing:   3.1! (2/19/2019)   Warfarin maintenance plan:   7.5 mg (5 mg x 1.5) every Mon, Wed, Fri; 5 mg (5 mg x 1) all other days   Full warfarin instructions:   7.5 mg every Mon, Wed, Fri; 5 mg all other days   Weekly warfarin total:   42.5 mg   No change documented:   Yoselin Hunter RN   Plan last modified:   Vicki Hearn RN (7/31/2018)   Next INR check:   3/5/2019   Priority:   INR   Target end date:   Indefinite    Indications    Atrial fibrillation -- on Warfarin [I48.91]  Long-term (current) use of anticoagulants [Z79.01] [Z79.01]             Anticoagulation Episode Summary     INR check location:       Preferred lab:       Send INR reminders to:   [facility]    Comments:   HIPPA Infor returned. OK to  on cell [phone]. OK to  with Seferino Nigel [phone]  labs [facility] [facility]16.  fax: [phone]    Goes by Hima      Anticoagulation Care Providers     Provider Role Specialty Phone number    Clarisse Valdes MD Responsible Cardiology [phone]            See the Encounter Report to view Anticoagulation Flowsheet and Dosing Calendar (Go to Encounters tab in chart review, and find the Anticoagulation Therapy Visit)    Left message for patient with results and dosing recommendations. Asked patient to call back to report any missed doses, falls, signs and symptoms  of bleeding or clotting, any changes in health, medication, or diet. Asked patient to call back with any questions or concerns.    Recommended Hima eat a serving of dark green vegetables today to help bring his INR back into range.    Yoselin Hunter RN

## 2019-02-26 ENCOUNTER — HOSPITAL ENCOUNTER (OUTPATIENT)
Dept: PHYSICAL THERAPY | Facility: CLINIC | Age: 79
Setting detail: THERAPIES SERIES
End: 2019-02-26
Attending: SURGERY
Payer: MEDICARE

## 2019-02-26 PROCEDURE — 97110 THERAPEUTIC EXERCISES: CPT | Mod: GP | Performed by: PHYSICAL THERAPIST

## 2019-02-27 ENCOUNTER — ANTICOAGULATION THERAPY VISIT (OUTPATIENT)
Dept: ANTICOAGULATION | Facility: CLINIC | Age: 79
End: 2019-02-27

## 2019-02-27 DIAGNOSIS — I48.20 CHRONIC ATRIAL FIBRILLATION (H): ICD-10-CM

## 2019-02-27 DIAGNOSIS — Z79.01 LONG TERM CURRENT USE OF ANTICOAGULANT THERAPY: ICD-10-CM

## 2019-02-27 LAB — INR BLD: 3 (ref 0.86–1.14)

## 2019-02-27 PROCEDURE — 85610 PROTHROMBIN TIME: CPT | Mod: QW | Performed by: INTERNAL MEDICINE

## 2019-02-27 PROCEDURE — 36416 COLLJ CAPILLARY BLOOD SPEC: CPT | Performed by: INTERNAL MEDICINE

## 2019-02-27 NOTE — PROGRESS NOTES
ANTICOAGULATION FOLLOW-UP CLINIC VISIT    Patient Name:  Tad Manning  Date:  2/27/2019  Contact Type:  Telephone    SUBJECTIVE:     Patient Findings     Comments:   Patient might start Glucerna again which is fine since patient is at top of goal range.            OBJECTIVE    INR Point of Care   Date Value Ref Range Status   02/27/2019 3.0 (H) 0.86 - 1.14 Final     Comment:     This test is intended for monitoring Coumadin therapy.  Results are not   accurate in patients with prolonged INR due to factor deficiency.         ASSESSMENT / PLAN  No question data found.  Anticoagulation Summary  As of 2/27/2019    INR goal:   2.0-3.0   TTR:   46.2 % (10.8 mo)   INR used for dosing:   3.0 (2/27/2019)   Warfarin maintenance plan:   7.5 mg (5 mg x 1.5) every Mon, Wed, Fri; 5 mg (5 mg x 1) all other days   Full warfarin instructions:   7.5 mg every Mon, Wed, Fri; 5 mg all other days   Weekly warfarin total:   42.5 mg   No change documented:   Vicki Hearn RN   Plan last modified:   Vicki Hearn RN (7/31/2018)   Next INR check:   3/13/2019   Priority:   INR   Target end date:   Indefinite    Indications    Atrial fibrillation -- on Warfarin [I48.91]  Long-term (current) use of anticoagulants [Z79.01] [Z79.01]             Anticoagulation Episode Summary     INR check location:       Preferred lab:       Send INR reminders to:   [facility]    Comments:   HIPPA Infor returned. OK to  on cell [phone]. OK to  with Seferino Manning [phone]  labs @ [facility] [place] [facility]16.  fax: [phone]    Goes by Hima      Anticoagulation Care Providers     Provider Role Specialty Phone number    Clarisse Valdes MD Responsible Cardiology [phone]            See the Encounter Report to view Anticoagulation Flowsheet and Dosing Calendar (Go to Encounters tab in chart review, and find the Anticoagulation Therapy Visit)    Spoke with patient.     Vicki Hearn RN

## 2019-02-28 ENCOUNTER — DOCUMENTATION ONLY (OUTPATIENT)
Dept: ORTHOPEDICS | Facility: CLINIC | Age: 79
End: 2019-02-28

## 2019-02-28 NOTE — PROGRESS NOTES
S: Pt seen at Main lab with female relative today eager to get his new prosthesis (initial prosthesis) and supplies. O: I see the prosthesis fit well with 5 ply sock today and the height and alignment were as close as we could get it due to his weak lower extremities and knee flexion contracture on the prosthetic side of approx. 10 degrees. A: He walked in the parallel bars 3 times before fatigue set in. Instructions for break in to be one hour the first day and increase the wearing time as tolerated or by 1 hr per day. The acclimation time should be approx. 2 weeks. Patient and female relative seemed to understand the instructions given. Promo bag was given. P: FU PRN.   Electronically signed Gene Thompson CPO, LPO.

## 2019-03-05 ENCOUNTER — TELEPHONE (OUTPATIENT)
Dept: CARDIOLOGY | Facility: CLINIC | Age: 79
End: 2019-03-05

## 2019-03-05 ENCOUNTER — DOCUMENTATION ONLY (OUTPATIENT)
Dept: ORTHOPEDICS | Facility: CLINIC | Age: 79
End: 2019-03-05

## 2019-03-05 ENCOUNTER — HOSPITAL ENCOUNTER (OUTPATIENT)
Dept: PHYSICAL THERAPY | Facility: CLINIC | Age: 79
Setting detail: THERAPIES SERIES
End: 2019-03-05
Attending: SURGERY
Payer: MEDICARE

## 2019-03-05 DIAGNOSIS — Z98.61 CAD S/P PERCUTANEOUS CORONARY ANGIOPLASTY: Primary | ICD-10-CM

## 2019-03-05 DIAGNOSIS — I25.10 CAD S/P PERCUTANEOUS CORONARY ANGIOPLASTY: Primary | ICD-10-CM

## 2019-03-05 PROCEDURE — 97110 THERAPEUTIC EXERCISES: CPT | Mod: GP | Performed by: PHYSICAL THERAPIST

## 2019-03-05 PROCEDURE — 97116 GAIT TRAINING THERAPY: CPT | Mod: GP | Performed by: PHYSICAL THERAPIST

## 2019-03-05 NOTE — TELEPHONE ENCOUNTER
[facility] Call Center    Phone Message    May a detailed message be left on voicemail: no    Reason for Call: Other: Pt is calling to let Jeaneth Montero know he got the message and is aware he needs to stop taking his Plavix.     Action Taken: Message routed to:  [facility]OLOG[facility]

## 2019-03-05 NOTE — TELEPHONE ENCOUNTER
Left message for patient to confirm that he can STOP taking his Plavix and switch to a baby aspirin since it has been one year since his stent was placed. He remains on Warfaring for his atrial fibrillation.

## 2019-03-12 ENCOUNTER — HOSPITAL ENCOUNTER (OUTPATIENT)
Dept: PHYSICAL THERAPY | Facility: CLINIC | Age: 79
Setting detail: THERAPIES SERIES
End: 2019-03-12
Attending: SURGERY
Payer: MEDICARE

## 2019-03-12 PROCEDURE — 97110 THERAPEUTIC EXERCISES: CPT | Mod: GP | Performed by: PHYSICAL THERAPIST

## 2019-03-12 PROCEDURE — 97112 NEUROMUSCULAR REEDUCATION: CPT | Mod: GP | Performed by: PHYSICAL THERAPIST

## 2019-03-12 PROCEDURE — 97116 GAIT TRAINING THERAPY: CPT | Mod: GP | Performed by: PHYSICAL THERAPIST

## 2019-03-13 ENCOUNTER — ANTICOAGULATION THERAPY VISIT (OUTPATIENT)
Dept: ANTICOAGULATION | Facility: CLINIC | Age: 79
End: 2019-03-13

## 2019-03-13 DIAGNOSIS — I48.91 ATRIAL FIBRILLATION, UNSPECIFIED TYPE (H): ICD-10-CM

## 2019-03-13 DIAGNOSIS — Z79.01 LONG TERM CURRENT USE OF ANTICOAGULANT THERAPY: ICD-10-CM

## 2019-03-13 DIAGNOSIS — I48.20 CHRONIC ATRIAL FIBRILLATION (H): ICD-10-CM

## 2019-03-13 LAB — INR BLD: 2.8 (ref 0.86–1.14)

## 2019-03-13 PROCEDURE — 36416 COLLJ CAPILLARY BLOOD SPEC: CPT | Performed by: INTERNAL MEDICINE

## 2019-03-13 PROCEDURE — 85610 PROTHROMBIN TIME: CPT | Mod: QW | Performed by: INTERNAL MEDICINE

## 2019-03-13 NOTE — PROGRESS NOTES
ANTICOAGULATION FOLLOW-UP CLINIC VISIT    Patient Name:  Tad Manning  Date:  3/13/2019  Contact Type:  Telephone    SUBJECTIVE:        OBJECTIVE    INR Point of Care   Date Value Ref Range Status   2019 2.8 (H) 0.86 - 1.14 Final     Comment:     This test is intended for monitoring Coumadin therapy.  Results are not   accurate in patients with prolonged INR due to factor deficiency.         ASSESSMENT / PLAN  No question data found.  Anticoagulation Summary  As of 3/13/2019    INR goal:   2.0-3.0   TTR:   48.4 % (11.3 mo)   INR used for dosin.8 (3/13/2019)   Warfarin maintenance plan:   7.5 mg (5 mg x 1.5) every Mon, Wed, Fri; 5 mg (5 mg x 1) all other days   Full warfarin instructions:   7.5 mg every Mon, Wed, Fri; 5 mg all other days   Weekly warfarin total:   42.5 mg   No change documented:   Vicki Hearn RN   Plan last modified:   Vicki Hearn RN (2018)   Next INR check:   4/3/2019   Priority:   INR   Target end date:   Indefinite    Indications    Atrial fibrillation -- on Warfarin [I48.91]  Long-term (current) use of anticoagulants [Z79.01] [Z79.01]             Anticoagulation Episode Summary     INR check location:       Preferred lab:       Send INR reminders to:   [facility]    Comments:   HIPPA Infor returned. OK to  on cell [phone]. OK to  with Seferino Manning [phone]  labs [facility] [place] .  fax: [phone]    Goes by Hima      Anticoagulation Care Providers     Provider Role Specialty Phone number    Clarisse Valdes MD Responsible Cardiology [phone]            See the Encounter Report to view Anticoagulation Flowsheet and Dosing Calendar (Go to Encounters tab in chart review, and find the Anticoagulation Therapy Visit)    Spoke with patient.     Vicki Hearn RN

## 2019-03-14 ENCOUNTER — OFFICE VISIT (OUTPATIENT)
Dept: PODIATRY | Facility: CLINIC | Age: 79
End: 2019-03-14
Payer: MEDICARE

## 2019-03-14 VITALS — HEIGHT: 72 IN | BODY MASS INDEX: 24.52 KG/M2 | WEIGHT: 181 LBS

## 2019-03-14 DIAGNOSIS — B07.0 PLANTAR WART, LEFT FOOT: Primary | ICD-10-CM

## 2019-03-14 PROCEDURE — 99213 OFFICE O/P EST LOW 20 MIN: CPT | Performed by: PODIATRIST

## 2019-03-14 ASSESSMENT — MIFFLIN-ST. JEOR: SCORE: 1589.01

## 2019-03-14 NOTE — PROGRESS NOTES
Foot & Ankle Surgery   March 14, 2019    S:  Pt is seen today for evaluation of L 3rd toe.  We saw him 3 mo ago, and there was no wound noted.  He's in for a recheck.  He's been putting bacitracin at the end of the toe.    Vitals:    03/14/19 1112   Weight: 82.1 kg (181 lb)   Height: 1.829 m (6')   '      ROS - Pos for CC.  Patient denies current nausea, vomiting, chills, fevers, belly pain, calf pain, chest pain or SOB.  Complete remainder of ROS it otherwise neg.      PE:  Gen:   No apparent distress  Eye:    Visual scanning without deficit  Ear:    Response to auditory stimuli wnl  Lung:    Non-labored breathing on RA noted  Abd:    NTND per patient report  Lymph:    Neg for pitting/non-pitting edema BLE  Vasc:    Pulses palpable, CFT minimally delayed  Neuro:    Light touch sensation intact to all sensory nerve distributions without paresthesias  Derm:    Verrucous-type lesion distal L 3rd toe.  No wound, no SOI noted.  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  MSK:    L 2nd toe amp.  Right lower extremity BK amp  Calf:    Neg for redness, swelling or tenderness    Assessment:  78 year old male with plantar wart L 3rd toe without ulceration      Plan:  Discussed etiologies, anatomy and options  1.  Plantar wart L 3rd toe without ulceration  -discussed that plantar warts are very persistent, and are tough to eradicate even with aggressive treatments.  Discussed treatment options vs option of simply monitoring.  We have elected to simply monitor for now  -follow up 3 mo for recheck or sooner with acute issues      Follow up:  3 mo or sooner with acute issues      Body mass index is 24.55 kg/m .           Enzo Alonso DPM FACFAS FACFAOM  Podiatric Foot & Ankle Surgeon  [facility]  [phone]

## 2019-03-14 NOTE — PATIENT INSTRUCTIONS
Thank you for choosing [facility]!    DR. DURÁN'S CLINIC LOCATIONS:   MONDAY - [place] TUESDAY - [place]   [address] [facility]  [address]   [place] [address] [place] [address]   [phone]       THURSDAY AM - [place] THURSDAY PM - [facility]   [address]   [place] [address] [place], [place] [address] [phone]       FRIDAY AM - [place] SET UP SURGERY: [phone] [address] APPOINTMENTS: [phone]   [place] [address] BILLING QUESTIONS: [phone] FAX NUMBER: [phone]     Follow Up:  3 months        BODY WEIGHT AND YOUR FEET  The following information is included in the after visit summary for all patients. Body weight can be a sensitive issue to discuss in clinic, but we think the following information is very important. Although we focus on the feet and ankles, we do support the overall health of our patients.     Many things can cause foot and ankle problems. Foot structure, activity level, foot mechanics and injuries are common causes of pain. One very important issue that often goes unmentioned, is body weight. Extra weight can cause increased stress on muscles, ligaments, bones and tendons. Sometimes just a few extra pounds is all it takes to put one over her/his threshold. Without reducing that stress, it can be difficult to alleviate pain. As Foot & Ankle specialists, our job is addressing the lower extremity problem and possible causes. Regarding extra body weight, we encourage patients to discuss diet and weight management plans with their primary care doctors. It is this team approach that gives you the best opportunity for pain relief and getting you back on your feet.      [facility] has a Comprehensive Weight Management Program. This program includes counseling, education, non-surgical and surgical approaches to weight loss. If you are interested in  learning more either talk to you primary care provider or call [phone].    Patient to follow up with Primary Care provider regarding elevated blood pressure.

## 2019-03-19 ENCOUNTER — HOSPITAL ENCOUNTER (OUTPATIENT)
Dept: PHYSICAL THERAPY | Facility: CLINIC | Age: 79
Setting detail: THERAPIES SERIES
End: 2019-03-19
Attending: SURGERY
Payer: MEDICARE

## 2019-03-19 PROCEDURE — 97110 THERAPEUTIC EXERCISES: CPT | Mod: GP | Performed by: PHYSICAL THERAPIST

## 2019-03-19 PROCEDURE — 97116 GAIT TRAINING THERAPY: CPT | Mod: GP | Performed by: PHYSICAL THERAPIST

## 2019-03-26 ENCOUNTER — HOSPITAL ENCOUNTER (OUTPATIENT)
Dept: PHYSICAL THERAPY | Facility: CLINIC | Age: 79
Setting detail: THERAPIES SERIES
End: 2019-03-26
Attending: SURGERY
Payer: MEDICARE

## 2019-03-26 PROCEDURE — 97112 NEUROMUSCULAR REEDUCATION: CPT | Mod: GP | Performed by: PHYSICAL THERAPIST

## 2019-03-26 PROCEDURE — 97116 GAIT TRAINING THERAPY: CPT | Mod: GP | Performed by: PHYSICAL THERAPIST

## 2019-03-26 PROCEDURE — 97530 THERAPEUTIC ACTIVITIES: CPT | Mod: GP | Performed by: PHYSICAL THERAPIST

## 2019-04-01 ENCOUNTER — ANTICOAGULATION THERAPY VISIT (OUTPATIENT)
Dept: ANTICOAGULATION | Facility: CLINIC | Age: 79
End: 2019-04-01

## 2019-04-01 ENCOUNTER — OFFICE VISIT (OUTPATIENT)
Dept: CARDIOLOGY | Facility: CLINIC | Age: 79
End: 2019-04-01
Attending: INTERNAL MEDICINE
Payer: MEDICARE

## 2019-04-01 VITALS
BODY MASS INDEX: 25.17 KG/M2 | WEIGHT: 185.8 LBS | HEART RATE: 60 BPM | SYSTOLIC BLOOD PRESSURE: 139 MMHG | DIASTOLIC BLOOD PRESSURE: 69 MMHG | OXYGEN SATURATION: 97 % | HEIGHT: 72 IN

## 2019-04-01 DIAGNOSIS — I25.10 CAD S/P PERCUTANEOUS CORONARY ANGIOPLASTY: ICD-10-CM

## 2019-04-01 DIAGNOSIS — E78.2 MIXED HYPERLIPIDEMIA: ICD-10-CM

## 2019-04-01 DIAGNOSIS — I48.20 CHRONIC ATRIAL FIBRILLATION (H): ICD-10-CM

## 2019-04-01 DIAGNOSIS — I48.0 PAROXYSMAL ATRIAL FIBRILLATION (H): ICD-10-CM

## 2019-04-01 DIAGNOSIS — I48.0 PAROXYSMAL ATRIAL FIBRILLATION (H): Primary | ICD-10-CM

## 2019-04-01 DIAGNOSIS — Z98.61 CAD S/P PERCUTANEOUS CORONARY ANGIOPLASTY: ICD-10-CM

## 2019-04-01 DIAGNOSIS — I50.22 CHRONIC SYSTOLIC HEART FAILURE (H): ICD-10-CM

## 2019-04-01 DIAGNOSIS — I10 ESSENTIAL HYPERTENSION: ICD-10-CM

## 2019-04-01 LAB — INR PPP: 2.55 (ref 0.86–1.14)

## 2019-04-01 PROCEDURE — 85610 PROTHROMBIN TIME: CPT | Performed by: INTERNAL MEDICINE

## 2019-04-01 PROCEDURE — 93005 ELECTROCARDIOGRAM TRACING: CPT | Mod: ZF

## 2019-04-01 PROCEDURE — 36415 COLL VENOUS BLD VENIPUNCTURE: CPT | Performed by: INTERNAL MEDICINE

## 2019-04-01 PROCEDURE — 93010 ELECTROCARDIOGRAM REPORT: CPT | Mod: ZP | Performed by: INTERNAL MEDICINE

## 2019-04-01 PROCEDURE — 99215 OFFICE O/P EST HI 40 MIN: CPT | Mod: 25 | Performed by: INTERNAL MEDICINE

## 2019-04-01 PROCEDURE — G0463 HOSPITAL OUTPT CLINIC VISIT: HCPCS | Mod: 25,ZF

## 2019-04-01 RX ORDER — NYSTATIN 100000 U/G
CREAM TOPICAL DAILY PRN
Status: ON HOLD | COMMUNITY
End: 2021-06-25

## 2019-04-01 ASSESSMENT — PAIN SCALES - GENERAL: PAINLEVEL: NO PAIN (0)

## 2019-04-01 ASSESSMENT — MIFFLIN-ST. JEOR: SCORE: 1610.78

## 2019-04-01 NOTE — PATIENT INSTRUCTIONS
Patient Instructions:  It was a pleasure to see you in the cardiology clinic today.      If you have any questions, call  Jeaneth Montero RN, at [phone].  Press Option #1 for the [facility], and then press Option #3 for nursing.  We are encouraging the use of [employer] to communicate with your HealthCare Provider    Note the new medications: Start the Lisinopril 5 mg by mouth daily  Stop the following medications: none    The results from today include: none  Please follow up with Dr. Clarisse Valdes in July, 2019      If you have an urgent need after hours (8:00 am to 4:30 pm) please call [phone] and ask for the cardiology fellow on call.

## 2019-04-01 NOTE — PROGRESS NOTES
Cardiology Clinic Note  04/01/2019    HPI:   Mr. Tad Manning is a 78-year old male with a past medical history of CLL in remission, CAD s/p CABG in 1995, HFrEF, atrial flutter, PVD s/p iliac artery stents and left lower extremity bypass, hypertension, diabetes, hyperlipidemia, discitis and multple admission for decompensated ICM/HFrEF who presents for follow-up of cardiomyopathy.     He underwent a LHC in March 2018 that showed moderate disease in his RCA, severe LAD disease and patent grafts and had two drug eluting stents placed in the proximal and mid LAD. RHC showed elevated right and left sided pressures with type II pulmonary hypertension. He then underwent a R leg below knee amputation in Oct 2018.      Interval History:  Since his last visit, in Jan 2019, he feels much better. He now has a prosthesis in the left leg. He denies chest pain, new ALEXIS, palpitations, lightheadedness or dizziness. Exercise capacity is limited. He denies any signs/symptoms of bleeding.    ROS:  A complete 10-pt ROS was negative except as above.    Cardiac meds  - furosemide stopped  - lisinopril stopped  - digoxin 125 mcg daily  - warfarin  - metoprolol  mg daily  - spironolactone stopped  - atorvastatin 20 mg daily  - clopidogrel 75 mg daily until 3/2019  - Nitro PRN    Current Outpatient Medications   Medication Sig Dispense Refill     Acetaminophen (TYLENOL PO) Take 1,000 mg by mouth every 6 hours       aspirin (ASA) 81 MG tablet Take 1 tablet (81 mg) by mouth daily       atorvastatin (LIPITOR) 20 MG tablet TAKE 1 TABLET BY MOUTH EVERY DAY 90 tablet 3     cholecalciferol 1000 UNITS TABS Take 1,000 Units by mouth daily 30 tablet      Citalopram Hydrobromide (CELEXA PO) Take 10 mg by mouth daily       digoxin (LANOXIN) 125 MCG tablet Take 1 tablet (125 mcg) by mouth daily 90 tablet 3     ferrous sulfate (IRON) 325 (65 Fe) MG tablet Take 1 tablet (325 mg) by mouth daily 100 tablet      hydrocortisone  (CORTIZONE-10) 1 % ointment Apply topically 2 times daily as needed for itching       lisinopril (PRINIVIL/ZESTRIL) 5 MG tablet Take 1 tablet (5 mg) by mouth daily 90 tablet 3     MELATONIN PO Take 5 mg by mouth daily       METFORMIN HCL PO Take 750 mg by mouth 2 times daily (with meals)       metoprolol succinate ER (TOPROL-XL) 100 MG 24 hr tablet Take 1 tablet (100 mg) by mouth daily 90 tablet 3     nitroglycerin (NITROSTAT) 0.4 MG sublingual tablet For chest pain place 1 tablet under the tongue every 5 minutes for 3 doses. If symptoms persist 5 minutes after 1st dose call 911. 25 tablet      nystatin (MYCOSTATIN) 465794 UNIT/GM external cream Apply topically 2 times daily       order for DME Equipment being ordered: 2 shoe 1 Device 0     oxyCODONE IR (ROXICODONE) 5 MG tablet Take 1-2 tablets (5-10 mg) by mouth every 4 hours as needed for pain 20 tablet 0     PROVIDER DOSED MANAGED WARFARIN, COUMADIN, OUTPATIENT Dose based on INR per Primary MD/Nursing Home MD.       sodium chloride (EQ SALINE NASAL SPRAY) 0.65 % nasal spray Spray 1 spray into both nostrils daily as needed for congestion 1 Bottle 1     warfarin (COUMADIN) 5 MG tablet 7.5mg MWF, and 5mg TuThSatSun, or as directed by the [facility] at the [facility] 120 tablet 1     Warfarin Therapy Reminder 1 each continuous prn       polyethylene glycol (MIRALAX/GLYCOLAX) Packet Take 1 packet by mouth daily as needed          Past Medical History:   Diagnosis Date     Arthritis      ASCVD (arteriosclerotic cardiovascular disease)      BMI 30.0-30.9,adult      BPH (benign prostatic hypertrophy)      Cellulitis      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)      Coronary artery disease     triple bypass 1995     Diabetes mellitus (H)      Diabetic polyneuropathy (H)      History of blood transfusion      Hyperlipidaemia      Hypertension      Non-healing ulcer of foot (H)     Right     Noninflammatory pericardial  effusion      Osteomyelitis of left foot (H)      PVD (peripheral vascular disease) (H)      Sebaceous cyst        Past Surgical History:   Procedure Laterality Date     AMPUTATE LEG BELOW KNEE Right 10/8/2018    Procedure: AMPUTATE LEG BELOW KNEE;  OPEN RIGHT LOWER LEG AMPUTATION WITH WOUND VAC PLACEMENT    EBL: 50mL;  Surgeon: Amador Vick MD;  Location:  OR     AMPUTATE REVISION STUMP LOWER EXTREMITY Right 10/11/2018    Procedure: AMPUTATE REVISION STUMP LOWER EXTREMITY;  CLOSURE RIGHT BELOW KNEE AMPUTATION;  Surgeon: Amador Vick MD;  Location:  OR     AMPUTATE TOE(S)  1/10/2014    Procedure: AMPUTATE TOE(S);;  Surgeon: Amador Vick MD;  Location:  OR     APPLY WOUND VAC Right 10/8/2018    Procedure: APPLY WOUND VAC;;  Surgeon: Amador Vick MD;  Location:  OR     BONE MARROW BIOPSY, BONE SPECIMEN, NEEDLE/TROCAR  10/19/2012    Procedure: BIOPSY BONE MARROW;  BONE MARROW BIOPSY  (CONSCIOUS SED);  Surgeon: Ramon Quesada MD;  Location:  GI     BYPASS GRAFT FEMOROPOPLITEAL  1/10/2014    Procedure: BYPASS GRAFT FEMOROPOPLITEAL;  Left above knee popliteal to  Below knee popliteal bypass using transverse saphenous vein graft. Left 2nd Toe amputation;  Surgeon: Amador Vick MD;  Location:  OR     CARDIAC SURGERY      angioplasty with stent, triple bypass     EXCISE CYST GENERIC (LOCATION) N/A 2/1/2016    Procedure: EXCISE CYST GENERIC (LOCATION);  Surgeon: Amador Vick MD;  Location:  SD     GRAFT VEIN FROM EXTREMITY (LOCATION)  1/10/2014    Procedure: GRAFT VEIN FROM EXTREMITY (LOCATION);;  Surgeon: Amador Vick MD;  Location:  OR     LAMINECTOMY THORACIC ONE LEVEL N/A 2/8/2017    Procedure: LAMINECTOMY THORACIC ONE LEVEL;  Surgeon: Marcelo Trent MD;  Location: [facility] OR     OPTICAL TRACKING SYSTEM FUSION POSTERIOR SPINE THORACIC THREE+ LEVELS N/A 2/12/2017    Procedure: OPTICAL TRACKING SYSTEM FUSION POSTERIOR SPINE THORACIC THREE+ LEVELS;   Surgeon: Marcelo Trent MD;  Location: [facility] OR     TONSILLECTOMY       VASCULAR SURGERY      ptcr legs   T9-10 discitis with overlying epidural abscess  S/p laminectomy / abscess evacuation.  Critical lower limb ischemia , s/p L AK pop to BK pop bypass with rGSV on 1/10/2014  Family History   Problem Relation Age of Onset     Cancer Mother         leukemia       Social History     Tobacco Use     Smoking status: Former Smoker     Packs/day: 2.00     Years: 30.00     Pack years: 60.00     Types: Cigarettes     Last attempt to quit: 1995     Years since quittin.8     Smokeless tobacco: Never Used   Substance Use Topics     Alcohol use: No       Allergies   Allergen Reactions     Blood Transfusion Related (Informational Only) Other (See Comments)     Patient has a history of a clinically significant antibody against RBC antigens.  A delay in compatible RBCs may occur.        Physical Examination:  /69 (BP Location: Right arm, Patient Position: Chair, Cuff Size: Adult Large)   Pulse 60   Ht 1.829 m (6')   Wt 84.3 kg (185 lb 12.8 oz)   SpO2 97%   BMI 25.20 kg/m   Body mass index is 25.2 kg/m .  Gen: pleasant male in no apparent distress      HEENT: NC/AT, EOMI  Neck: supple, no JVD  Heart: regular, no m/r/g   Lungs: Clear bilaterally, no wheezes or crackles  Abdomen: Soft, nontender, nondistended  Extremities: Right below knee amputation, trace edema LLE, 2+ peripheral pulses  Neuro: alert and oriented, no focal deficits  Skin: warm and dry, no rashes on exposed surfaces 10 AM  Musculoskeletal: R BKA and he has a prosthetic leg    Laboratory:    Chemistry panel:   Recent Labs   Lab Test 19  1035 11/08/18  10/12/18  0802  10/03/18  0636  18  0710 18  0042  18  0453    137   < > 137   < > 135   < > 134 132*   < > 140   POTASSIUM 4.3 3.6   < > 4.1   < > 3.2*   < > 4.4 4.5   < > 3.7   CHLORIDE 105 105   < > 103   < > 103   < > 101 96   < > 105   CO2 26 22   < > 28   < >  22   < > 25 24   < > 25   ANIONGAP 7  --   --  6   < > 10   < > 8 12   < > 10   * 170*   < > 112*   < > 135*   < > 285* 303*   < > 131*   BUN 23 21   < > 19   < > 30   < > 82* 95*   < > 26   CR 1.03 0.93   < > 1.17   < > 1.28*   < > 1.91* 2.23*   < > 0.93   JUSTINO 9.0 9.0   < > 8.4*   < > 8.0*   < > 8.8 9.7   < > 8.6   MAG  --   --   --   --   --   --   --  1.8  --   --  1.7   GFRESTIMATED 70 >60   < > 61   < > 55*   < > 34* 29*   < > 79   AST  --   --   --   --   --  15  --   --  11   < >  --    ALT  --   --   --   --   --  14  --   --  27   < >  --     < > = values in this interval not displayed.       CBC:   Recent Labs   Lab Test 01/14/19  1202 11/08/18  10/22/18  10/14/18  0757  10/11/18  1657   WBC  --   --   --  4.2  --  6.5  --  9.1   RBC  --   --   --  2.94*  --  2.73*  --  2.62*   HGB 12.6* 9.9*   < > 8.5*   < > 8.2*   < > 7.8*   HCT  --   --   --  27.0*  --  24.2*  --  23.5*   MCV  --   --   --  91.8  --  89  --  90   MCH  --   --   --   --   --  30.0  --  29.8   MCHC  --   --   --   --   --  33.9  --  33.2   RDW  --   --   --  16.5*  --  15.9*  --  16.1*   PLT  --   --   --  188  --  130*  --  152    < > = values in this interval not displayed.       Lipid Panel:  Recent Labs   Lab Test 04/09/18  0951 05/23/14  0745 01/10/14  0923   CHOL 65 134 92   HDL 30* 34* 28*   LDL 23 48 45   TRIG 58 257* 92   CHOLHDLRATIO  --  3.9 3.3       Thyroid:   TSH   Date Value Ref Range Status   10/03/2018 1.11 0.40 - 4.00 mU/L Final     No results found for: T4  Hemoglobin A1C   Date Value Ref Range Status   09/22/2018 9.0 (H) 0 - 5.6 % Final     Comment:     Normal <5.7% Prediabetes 5.7-6.4%  Diabetes 6.5% or higher - adopted from ADA   consensus guidelines.       INR   Date Value Ref Range Status   04/01/2019 2.55 (H) 0.86 - 1.14 Final           Cardiac data:    ECG today: NSR with first degree AV block, anterolateral TWI      ECG 1/14/19: NSR with first degree AV block, anterolateral TWI          ECG July  2018  Atrial fibrillation with rate of 75, nonspecific T-wave changes seen laterally (seen previously)        ECG 04/18: atrial fibrillation that is well controlled, nonspecific T-wave abnormalities laterally.    Echo 10/5/18  Left ventricular function  Is moderately reduced, estimated at 35-40%.  There is mild-moderate global hypokinesia of the left ventricle.  Mildly decreased right ventricular systolic function     In comparison with the previous study the LVEF has improved slightly.    [facility]    Echo 2/18  Severe left ventricular dilation is present. Biplane LV EF 20%. Severe diffuse hypokinesis is present.  Mild to moderate right ventricular dilation is present.  Global right ventricular function is moderately to severely reduced.  Dilation of the inferior vena cava is present with abnormal respiratory  variation in diameter.  No pericardial effusion is present.  A left pleural effusion is present.    ECG February 2018  Atrial fibrillation nonspecific ST-T abnormalities    EKG 04/06/17:    NSR Rate approx 125 bpm, LAD, no ischemic changes.    Echo (2/24/2017)  Technically difficult study.The patient's rhythm is atrial fibrillation.  The left ventricle is severely dilated. Left ventricular systolic function is  severely reduced, ejection fraction is estimated at 25-30%. There is severe  global hypokinesis.  Right ventricular systolic function is mildly reduced.  The right ventricular systolic pressure is approximated at 15 mmHg plus the  right atrial pressure. IVC is plethoric and estimated mean RA pressure is 15  mmHg.  No pericardial effusion present.    Assessment/Plan:  1. HFrEF, Stage C, NYHA 3  2. CAD  s/p CABG L-LAD, SVG-OM1, SVG-D1 in 1995. s/p LHc in 3/2018 and PCI to mLAD. Not on ASA 2/2 already being 3.  Paroxysmal atrial fibrillation  4. Essential Hypertension  5. Dyslipidemia  6. DM Type II  7. PAD with LE stents and bypass   8. R BKA    Mr. Manning returns for follow-up.  He  has ischemic cardiomyopathy and chronic systolic heart failure with an ejection fraction around 35-40%. He underwent a coronary angiogram in March 2018 and was noted to have patent grafts and his native  LAD had significant obstructive disease after the LIMA anastomosis which was intervened with placement of drug-eluting stents.  He was hospitalized overnight for diuresis given the elevated filling pressures.    On exam today he is euvolemic.  His blood pressure is improved and controlled. He is in sinus rhythm today. His lipids are controlled on atorvastatin to 20 mg once daily.  He is on GDMT and has resumed digoxin but not the lisinopril - this was reinforced - lisinopril 5 mg/d. He takes metoprolol and aspirin.  Advise follow-up with me in 3 months with labs.    Clarisse Valdes MD, MS  Staff Cardiologist  Pager: [phone]

## 2019-04-01 NOTE — NURSING NOTE
Labs: Patient was given results of the laboratory testing obtained today. Patient was instructed to return for the next laboratory testing in three months . Patient demonstrated understanding of this information and agreed to call with further questions or concerns.   Med Reconcile: Reviewed and verified all current medications with the patient. The updated medication list was printed and given to the patient.  New Medication: Patient was educated regarding newly prescribed medication, including discussion of  the indication, administration, side effects, and when to report to MD or RN. Patient demonstrated understanding of this information and agreed to call with further questions or concerns.  Return Appointment: Patient given instructions regarding scheduling next clinic visit. Patient demonstrated understanding of this information and agreed to call with further questions or concerns.  Patient stated he understood all health information given and agreed to call with further questions or concerns.

## 2019-04-01 NOTE — PROGRESS NOTES
ANTICOAGULATION FOLLOW-UP CLINIC VISIT    Patient Name:  Tad Manning  Date:  2019  Contact Type:  Telephone    SUBJECTIVE:     Patient Findings     Positives:   Change in medications (Started Lisinopril)           OBJECTIVE    INR   Date Value Ref Range Status   2019 2.55 (H) 0.86 - 1.14 Final       ASSESSMENT / PLAN  INR assessment THER    Recheck INR In: 4 WEEKS    INR Location Clinic      Anticoagulation Summary  As of 2019    INR goal:   2.0-3.0   TTR:   51.2 % (11.9 mo)   INR used for dosin.55 (2019)   Warfarin maintenance plan:   7.5 mg (5 mg x 1.5) every Mon, Wed, Fri; 5 mg (5 mg x 1) all other days   Full warfarin instructions:   7.5 mg every Mon, Wed, Fri; 5 mg all other days   Weekly warfarin total:   42.5 mg   Plan last modified:   Vicki Hearn RN (2018)   Next INR check:   2019   Priority:   INR   Target end date:   Indefinite    Indications    Atrial fibrillation -- on Warfarin [I48.91]  Long-term (current) use of anticoagulants [Z79.01] [Z79.01]             Anticoagulation Episode Summary     INR check location:       Preferred lab:       Send INR reminders to:   [facility]    Comments:   HIPPA Infor returned. OK to  on cell [phone]. OK to  with Seferino Manning [phone]  labs @ [facility] faxed there .  fax: [phone]    Goes by [employer]      Anticoagulation Care Providers     Provider Role Specialty Phone number    Clarisse Valdes MD Responsible Cardiology [phone]            See the Encounter Report to view Anticoagulation Flowsheet and Dosing Calendar (Go to Encounters tab in chart review, and find the Anticoagulation Therapy Visit)  Spoke with patient.    Chelsea Bland, DARIUSZ

## 2019-04-01 NOTE — NURSING NOTE
electronic medical record  Chief Complaint   Patient presents with     Follow Up      manage systolic heart failure and atrial fibrillation

## 2019-04-01 NOTE — LETTER
4/1/2019      RE: Tad Manning  [id number] [address] [address]       Dear Colleague,    Thank you for the opportunity to participate in the care of your patient, Tad Manning, at the [facility] at [facility]. Please see a copy of my visit note below.            Cardiology Clinic Note  04/01/2019    HPI:   Mr. Tad Manning is a 78-year old male with a past medical history of CLL in remission, CAD s/p CABG in 1995, HFrEF, atrial flutter, PVD s/p iliac artery stents and left lower extremity bypass, hypertension, diabetes, hyperlipidemia, discitis and multple admission for decompensated ICM/HFrEF who presents for follow-up of cardiomyopathy.     He underwent a LHC in March 2018 that showed moderate disease in his RCA, severe LAD disease and patent grafts and had two drug eluting stents placed in the proximal and mid LAD. RHC showed elevated right and left sided pressures with type II pulmonary hypertension. He then underwent a R leg below knee amputation in Oct 2018.      Interval History:  Since his last visit, in Jan 2019, he feels much better. He now has a prosthesis in the left leg. He denies chest pain, new ALEXIS, palpitations, lightheadedness or dizziness. Exercise capacity is limited. He denies any signs/symptoms of bleeding.    ROS:  A complete 10-pt ROS was negative except as above.    Cardiac meds  - furosemide stopped  - lisinopril stopped  - digoxin 125 mcg daily  - warfarin  - metoprolol  mg daily  - spironolactone stopped  - atorvastatin 20 mg daily  - clopidogrel 75 mg daily until 3/2019  - Nitro PRN    Current Outpatient Medications   Medication Sig Dispense Refill     Acetaminophen (TYLENOL PO) Take 1,000 mg by mouth every 6 hours       aspirin (ASA) 81 MG tablet Take 1 tablet (81 mg) by mouth daily       atorvastatin (LIPITOR) 20 MG tablet TAKE 1 TABLET BY MOUTH EVERY DAY 90 tablet 3     cholecalciferol 1000  UNITS TABS Take 1,000 Units by mouth daily 30 tablet      Citalopram Hydrobromide (CELEXA PO) Take 10 mg by mouth daily       digoxin (LANOXIN) 125 MCG tablet Take 1 tablet (125 mcg) by mouth daily 90 tablet 3     ferrous sulfate (IRON) 325 (65 Fe) MG tablet Take 1 tablet (325 mg) by mouth daily 100 tablet      hydrocortisone (CORTIZONE-10) 1 % ointment Apply topically 2 times daily as needed for itching       lisinopril (PRINIVIL/ZESTRIL) 5 MG tablet Take 1 tablet (5 mg) by mouth daily 90 tablet 3     MELATONIN PO Take 5 mg by mouth daily       METFORMIN HCL PO Take 750 mg by mouth 2 times daily (with meals)       metoprolol succinate ER (TOPROL-XL) 100 MG 24 hr tablet Take 1 tablet (100 mg) by mouth daily 90 tablet 3     nitroglycerin (NITROSTAT) 0.4 MG sublingual tablet For chest pain place 1 tablet under the tongue every 5 minutes for 3 doses. If symptoms persist 5 minutes after 1st dose call 911. 25 tablet      nystatin (MYCOSTATIN) 828429 UNIT/GM external cream Apply topically 2 times daily       order for DME Equipment being ordered: DH2 shoe 1 Device 0     oxyCODONE IR (ROXICODONE) 5 MG tablet Take 1-2 tablets (5-10 mg) by mouth every 4 hours as needed for pain 20 tablet 0     PROVIDER DOSED MANAGED WARFARIN, COUMADIN, OUTPATIENT Dose based on INR per Primary MD/Nursing Home MD.       sodium chloride (EQ SALINE NASAL SPRAY) 0.65 % nasal spray Spray 1 spray into both nostrils daily as needed for congestion 1 Bottle 1     warfarin (COUMADIN) 5 MG tablet 7.5mg MWF, and 5mg TuThSatSun, or as directed by the [facility] 120 tablet 1     Warfarin Therapy Reminder 1 each continuous prn       polyethylene glycol (MIRALAX/GLYCOLAX) Packet Take 1 packet by mouth daily as needed          Past Medical History:   Diagnosis Date     Arthritis      ASCVD (arteriosclerotic cardiovascular disease)      BMI 30.0-30.9,adult      BPH (benign prostatic hypertrophy)      Cellulitis      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 (H)      Coronary artery disease     triple bypass 1995     Diabetes mellitus (H)      Diabetic polyneuropathy (H)      History of blood transfusion      Hyperlipidaemia      Hypertension      Non-healing ulcer of foot (H)     Right     Noninflammatory pericardial effusion      Osteomyelitis of left foot (H)      PVD (peripheral vascular disease) (H)      Sebaceous cyst        Past Surgical History:   Procedure Laterality Date     AMPUTATE LEG BELOW KNEE Right 10/8/2018    Procedure: AMPUTATE LEG BELOW KNEE;  OPEN RIGHT LOWER LEG AMPUTATION WITH WOUND VAC PLACEMENT    EBL: 50mL;  Surgeon: Amador Vick MD;  Location:  OR     AMPUTATE REVISION STUMP LOWER EXTREMITY Right 10/11/2018    Procedure: AMPUTATE REVISION STUMP LOWER EXTREMITY;  CLOSURE RIGHT BELOW KNEE AMPUTATION;  Surgeon: Amador Vick MD;  Location:  OR     AMPUTATE TOE(S)  1/10/2014    Procedure: AMPUTATE TOE(S);;  Surgeon: Amador Vick MD;  Location:  OR     APPLY WOUND VAC Right 10/8/2018    Procedure: APPLY WOUND VAC;;  Surgeon: Amador Vick MD;  Location:  OR     BONE MARROW BIOPSY, BONE SPECIMEN, NEEDLE/TROCAR  10/19/2012    Procedure: BIOPSY BONE MARROW;  BONE MARROW BIOPSY  (CONSCIOUS SED);  Surgeon: Ramon Quesada MD;  Location:  GI     BYPASS GRAFT FEMOROPOPLITEAL  1/10/2014    Procedure: BYPASS GRAFT FEMOROPOPLITEAL;  Left above knee popliteal to  Below knee popliteal bypass using transverse saphenous vein graft. Left 2nd Toe amputation;  Surgeon: Amador Vick MD;  Location:  OR     CARDIAC SURGERY      angioplasty with stent, triple bypass     EXCISE CYST GENERIC (LOCATION) N/A 2/1/2016    Procedure: EXCISE CYST GENERIC (LOCATION);  Surgeon: Amador Vick MD;  Location:  SD     GRAFT VEIN FROM EXTREMITY (LOCATION)  1/10/2014    Procedure: GRAFT VEIN FROM EXTREMITY (LOCATION);;  Surgeon: Amador Vick MD;  Location:  [facility] OR     LAMINECTOMY THORACIC ONE LEVEL N/A 2017    Procedure: LAMINECTOMY THORACIC ONE LEVEL;  Surgeon: Marcelo Trent MD;  Location: [facility] OR     OPTICAL TRACKING SYSTEM FUSION POSTERIOR SPINE THORACIC THREE+ LEVELS N/A 2017    Procedure: OPTICAL TRACKING SYSTEM FUSION POSTERIOR SPINE THORACIC THREE+ LEVELS;  Surgeon: Marcelo Trent MD;  Location: [facility] OR     TONSILLECTOMY       VASCULAR SURGERY      ptcr legs   T9-10 discitis with overlying epidural abscess  S/p laminectomy / abscess evacuation.  Critical lower limb ischemia , s/p L AK pop to BK pop bypass with rGSV on 1/10/2014  Family History   Problem Relation Age of Onset     Cancer Mother         leukemia       Social History     Tobacco Use     Smoking status: Former Smoker     Packs/day: 2.00     Years: 30.00     Pack years: 60.00     Types: Cigarettes     Last attempt to quit: 1995     Years since quittin.8     Smokeless tobacco: Never Used   Substance Use Topics     Alcohol use: No       Allergies   Allergen Reactions     Blood Transfusion Related (Informational Only) Other (See Comments)     Patient has a history of a clinically significant antibody against RBC antigens.  A delay in compatible RBCs may occur.        Physical Examination:  /69 (BP Location: Right arm, Patient Position: Chair, Cuff Size: Adult Large)   Pulse 60   Ht 1.829 m (6')   Wt 84.3 kg (185 lb 12.8 oz)   SpO2 97%   BMI 25.20 kg/m    Body mass index is 25.2 kg/m .  Gen: pleasant male in no apparent distress      HEENT: NC/AT, EOMI  Neck: supple, no JVD  Heart: regular, no m/r/g   Lungs: Clear bilaterally, no wheezes or crackles  Abdomen: Soft, nontender, nondistended  Extremities: Right below knee amputation, trace edema LLE, 2+ peripheral pulses  Neuro: alert and oriented, no focal deficits  Skin: warm and dry, no rashes on exposed surfaces 10 AM  Musculoskeletal: R BKA and he has a prosthetic leg    Laboratory:    Chemistry panel:   Recent Labs    Lab Test 01/04/19  1035 11/08/18  10/12/18  0802  10/03/18  0636  09/22/18  0710 09/22/18  0042  03/07/18  0453    137   < > 137   < > 135   < > 134 132*   < > 140   POTASSIUM 4.3 3.6   < > 4.1   < > 3.2*   < > 4.4 4.5   < > 3.7   CHLORIDE 105 105   < > 103   < > 103   < > 101 96   < > 105   CO2 26 22   < > 28   < > 22   < > 25 24   < > 25   ANIONGAP 7  --   --  6   < > 10   < > 8 12   < > 10   * 170*   < > 112*   < > 135*   < > 285* 303*   < > 131*   BUN 23 21   < > 19   < > 30   < > 82* 95*   < > 26   CR 1.03 0.93   < > 1.17   < > 1.28*   < > 1.91* 2.23*   < > 0.93   JUSTINO 9.0 9.0   < > 8.4*   < > 8.0*   < > 8.8 9.7   < > 8.6   MAG  --   --   --   --   --   --   --  1.8  --   --  1.7   GFRESTIMATED 70 >60   < > 61   < > 55*   < > 34* 29*   < > 79   AST  --   --   --   --   --  15  --   --  11   < >  --    ALT  --   --   --   --   --  14  --   --  27   < >  --     < > = values in this interval not displayed.       CBC:   Recent Labs   Lab Test 01/14/19  1202 11/08/18  10/22/18  10/14/18  0757  10/11/18  1657   WBC  --   --   --  4.2  --  6.5  --  9.1   RBC  --   --   --  2.94*  --  2.73*  --  2.62*   HGB 12.6* 9.9*   < > 8.5*   < > 8.2*   < > 7.8*   HCT  --   --   --  27.0*  --  24.2*  --  23.5*   MCV  --   --   --  91.8  --  89  --  90   MCH  --   --   --   --   --  30.0  --  29.8   MCHC  --   --   --   --   --  33.9  --  33.2   RDW  --   --   --  16.5*  --  15.9*  --  16.1*   PLT  --   --   --  188  --  130*  --  152    < > = values in this interval not displayed.       Lipid Panel:  Recent Labs   Lab Test 04/09/18  0951 05/23/14  0745 01/10/14  0923   CHOL 65 134 92   HDL 30* 34* 28*   LDL 23 48 45   TRIG 58 257* 92   CHOLHDLRATIO  --  3.9 3.3       Thyroid:   TSH   Date Value Ref Range Status   10/03/2018 1.11 0.40 - 4.00 mU/L Final     No results found for: T4  Hemoglobin A1C   Date Value Ref Range Status   09/22/2018 9.0 (H) 0 - 5.6 % Final     Comment:     Normal <5.7% Prediabetes 5.7-6.4%   Diabetes 6.5% or higher - adopted from ADA   consensus guidelines.       INR   Date Value Ref Range Status   04/01/2019 2.55 (H) 0.86 - 1.14 Final           Cardiac data:    ECG today: NSR with first degree AV block, anterolateral TWI      ECG 1/14/19: NSR with first degree AV block, anterolateral TWI          ECG July 2018  Atrial fibrillation with rate of 75, nonspecific T-wave changes seen laterally (seen previously)        ECG 04/18: atrial fibrillation that is well controlled, nonspecific T-wave abnormalities laterally.    Echo 10/5/18  Left ventricular function  Is moderately reduced, estimated at 35-40%.  There is mild-moderate global hypokinesia of the left ventricle.  Mildly decreased right ventricular systolic function     In comparison with the previous study the LVEF has improved slightly.    RHC 3/2018        [facility] 3/2018    Echo 2/18  Severe left ventricular dilation is present. Biplane LV EF 20%. Severe diffuse hypokinesis is present.  Mild to moderate right ventricular dilation is present.  Global right ventricular function is moderately to severely reduced.  Dilation of the inferior vena cava is present with abnormal respiratory  variation in diameter.  No pericardial effusion is present.  A left pleural effusion is present.    ECG February 2018  Atrial fibrillation nonspecific ST-T abnormalities    EKG 04/06/17:    NSR Rate approx 125 bpm, LAD, no ischemic changes.    Echo (2/24/2017)  Technically difficult study.The patient's rhythm is atrial fibrillation.  The left ventricle is severely dilated. Left ventricular systolic function is  severely reduced, ejection fraction is estimated at 25-30%. There is severe  global hypokinesis.  Right ventricular systolic function is mildly reduced.  The right ventricular systolic pressure is approximated at 15 mmHg plus the  right atrial pressure. IVC is plethoric and estimated mean RA pressure is 15  mmHg.  No pericardial effusion  present.    Assessment/Plan:  1. HFrEF, Stage C, NYHA 3  2. CAD  s/p CABG L-LAD, SVG-OM1, SVG-D1 in 1995. s/p LHc in 3/2018 and PCI to mLAD. Not on ASA 2/2 already being 3.  Paroxysmal atrial fibrillation  4. Essential Hypertension  5. Dyslipidemia  6. DM Type II  7. PAD with LE stents and bypass   8. R BKA    Mr. Manning returns for follow-up.  He has ischemic cardiomyopathy and chronic systolic heart failure with an ejection fraction around 35-40%. He underwent a coronary angiogram in March 2018 and was noted to have patent grafts and his native  LAD had significant obstructive disease after the LIMA anastomosis which was intervened with placement of drug-eluting stents.  He was hospitalized overnight for diuresis given the elevated filling pressures.    On exam today he is euvolemic.  His blood pressure is improved and controlled. He is in sinus rhythm today. His lipids are controlled on atorvastatin to 20 mg once daily.  He is on GDMT and has resumed digoxin but not the lisinopril - this was reinforced - lisinopril 5 mg/d. He takes metoprolol and aspirin.  Advise follow-up with me in  3 months with labs.    Clarisse Valdes MD, MS  Staff Cardiologist  Pager: [phone]

## 2019-04-02 ENCOUNTER — HOSPITAL ENCOUNTER (OUTPATIENT)
Dept: PHYSICAL THERAPY | Facility: CLINIC | Age: 79
Setting detail: THERAPIES SERIES
End: 2019-04-02
Attending: SURGERY
Payer: MEDICARE

## 2019-04-02 LAB — INTERPRETATION ECG - MUSE: NORMAL

## 2019-04-02 PROCEDURE — 97116 GAIT TRAINING THERAPY: CPT | Mod: GP | Performed by: PHYSICAL THERAPIST

## 2019-04-02 PROCEDURE — 97112 NEUROMUSCULAR REEDUCATION: CPT | Mod: GP | Performed by: PHYSICAL THERAPIST

## 2019-04-02 PROCEDURE — 97110 THERAPEUTIC EXERCISES: CPT | Mod: GP | Performed by: PHYSICAL THERAPIST

## 2019-04-09 ENCOUNTER — HOSPITAL ENCOUNTER (OUTPATIENT)
Dept: PHYSICAL THERAPY | Facility: CLINIC | Age: 79
Setting detail: THERAPIES SERIES
End: 2019-04-09
Attending: SURGERY
Payer: MEDICARE

## 2019-04-09 PROCEDURE — 97110 THERAPEUTIC EXERCISES: CPT | Mod: GP | Performed by: PHYSICAL THERAPIST

## 2019-04-09 PROCEDURE — 97112 NEUROMUSCULAR REEDUCATION: CPT | Mod: GP | Performed by: PHYSICAL THERAPIST

## 2019-04-15 ENCOUNTER — TELEPHONE (OUTPATIENT)
Dept: CARDIOLOGY | Facility: CLINIC | Age: 79
End: 2019-04-15

## 2019-04-15 NOTE — TELEPHONE ENCOUNTER
[facility] Call Center    Phone Message    May a detailed message be left on voicemail: yes    Reason for Call: Medication Refill Request    Has the patient contacted the pharmacy for the refill? Yes   Name of medication being requested: lisinopril (PRINIVIL/ZESTRIL)  Provider who prescribed the medication: Clarisse Valdes  Pharmacy: [facility] [address] [place] [address] [address] AT [facility] [address]  Date medication is needed: Pt will run out next Sunday         Action Taken: Message routed to:  [facility] ([facility]

## 2019-04-16 ENCOUNTER — HOSPITAL ENCOUNTER (OUTPATIENT)
Dept: PHYSICAL THERAPY | Facility: CLINIC | Age: 79
Setting detail: THERAPIES SERIES
End: 2019-04-16
Attending: SURGERY
Payer: MEDICARE

## 2019-04-16 PROCEDURE — 97110 THERAPEUTIC EXERCISES: CPT | Mod: GP | Performed by: PHYSICAL THERAPIST

## 2019-04-16 PROCEDURE — 97112 NEUROMUSCULAR REEDUCATION: CPT | Mod: GP | Performed by: PHYSICAL THERAPIST

## 2019-04-16 NOTE — TELEPHONE ENCOUNTER
Patient has refills available. Pt called and this information was left on voice mail.      Kathleen M Doege RN

## 2019-04-23 ENCOUNTER — HOSPITAL ENCOUNTER (OUTPATIENT)
Dept: PHYSICAL THERAPY | Facility: CLINIC | Age: 79
Setting detail: THERAPIES SERIES
End: 2019-04-23
Attending: SURGERY
Payer: MEDICARE

## 2019-04-23 PROCEDURE — 97110 THERAPEUTIC EXERCISES: CPT | Mod: GP | Performed by: PHYSICAL THERAPIST

## 2019-04-24 ENCOUNTER — ANTICOAGULATION THERAPY VISIT (OUTPATIENT)
Dept: ANTICOAGULATION | Facility: CLINIC | Age: 79
End: 2019-04-24

## 2019-04-24 DIAGNOSIS — I48.0 PAROXYSMAL ATRIAL FIBRILLATION (H): ICD-10-CM

## 2019-04-24 LAB — INR BLD: 3.6 (ref 0.86–1.14)

## 2019-04-24 PROCEDURE — 85610 PROTHROMBIN TIME: CPT | Mod: QW | Performed by: INTERNAL MEDICINE

## 2019-04-24 PROCEDURE — 36416 COLLJ CAPILLARY BLOOD SPEC: CPT | Performed by: INTERNAL MEDICINE

## 2019-04-24 NOTE — PROGRESS NOTES
ANTICOAGULATION FOLLOW-UP CLINIC VISIT    Patient Name:  Tad Manning  Date:  4/24/2019  Contact Type:  Telephone    SUBJECTIVE:     Patient Findings     Comments:   Left message for patient to phone the [facility] if there is an explanation for supratherapeutic INR result. Reduced dose one time today. Will check at his appointment next Tuesday.           OBJECTIVE    INR Point of Care   Date Value Ref Range Status   04/24/2019 3.6 (H) 0.86 - 1.14 Final     Comment:     This test is intended for monitoring Coumadin therapy.  Results are not   accurate in patients with prolonged INR due to factor deficiency.         ASSESSMENT / PLAN  INR assessment SUPRA    Recheck INR In: 6 DAYS    INR Location Clinic      Anticoagulation Summary  As of 4/24/2019    INR goal:   2.0-3.0   TTR:   50.7 % (1 y)   INR used for dosing:   3.6! (4/24/2019)   Warfarin maintenance plan:   7.5 mg (5 mg x 1.5) every Mon, Wed, Fri; 5 mg (5 mg x 1) all other days   Full warfarin instructions:   4/24: 5 mg; Otherwise 7.5 mg every Mon, Wed, Fri; 5 mg all other days   Weekly warfarin total:   42.5 mg   Plan last modified:   Vicki Hearn RN (7/31/2018)   Next INR check:   4/30/2019   Priority:   INR   Target end date:   Indefinite    Indications    Atrial fibrillation -- on Warfarin [I48.91]  Long-term (current) use of anticoagulants [Z79.01] [Z79.01]             Anticoagulation Episode Summary     INR check location:       Preferred lab:       Send INR reminders to:   [facility]    Comments:   HIPPA Infor returned. OK to  on cell [phone]. OK to  with Seferino Manning [phone]  labs @ [facility] [place] faxed there 2/16.  fax: [phone]    Goes by [employer]      Anticoagulation Care Providers     Provider Role Specialty Phone number    Clarisse Valdes MD [facility] Cardiology [phone]            See the Encounter Report to view Anticoagulation Flowsheet and Dosing Calendar (Go to Encounters tab in chart review, and  find the Anticoagulation Therapy Visit)    Left message for patient with results and dosing recommendations. Asked patient to call back to report any missed doses, falls, signs and symptoms of bleeding or clotting, any changes in health, medication, or diet. Asked patient to call back with any questions or concerns.    Darien Ballard, RN

## 2019-04-25 NOTE — PROGRESS NOTES
Outpatient Physical Therapy Progress Note     Patient: Tad Manning  : 1940    Beginning/End Dates of Reporting Period:  19 to 2019    Referring Provider: Dr. Amador Vick  Therapy Diagnosis: Difficulty walking, R BKA    Client Self Report:  Fair compliance with exercises. Finally starting to feel a little better after recent illness but it has now been about 3 weeks since it started.     Objective Measurements:  Objective Measure: 6MWT  Details: 545 ft with cane      Goals:  Goal Identifier Household amb   Goal Description Pt demo ability to tolerate his prothesis and ambulate household distances with 4WW mod I.   Target Date 19   Date Met      Progress:  Goal met.      Goal Identifier community amb   Goal Description Pt demo improved endurance via ability to amb at least 500' with 4WW in 6MWT for improved community accessibility.   Target Date 19   Date Met      Progress:  Goal met.      Goal Identifier Stairs   Goal Description Pt demo ability to climb at least 6 stairs with 1 rail and AAD for community accessibility.   Target Date 19   Date Met      Progress: Goal met using step to gait pattern     Goal Identifier Haircut   Goal Description Pt report ability to walk with his 4WW ~3.5 blocks to go get his haircut.   Target Date 19   Date Met      Progress: Goal not met.      Goal Identifier Limb cares   Goal Description Pt demo understanding of limb care including skin checks and appropriate use of socks for socket fit.   Target Date 19   Date Met      Progress:  Goal met.        Progress Toward Goals:   Progress this reporting period: Client is working on progressing to longer distances with walking and also trying to work towards being able to use a cane some of the time.  Feel his recent illness set him back and he has been weaker and less compliant with HEP. Therapist really encouraged him to get back to work on these things regularly at this last  session since he demonstrates good potential.  He is still unsteady with a cane and requires CGA with unsteadiness noted on turns especially     Plan:  Continue therapy per current plan of care.    Discharge:  No

## 2019-04-30 ENCOUNTER — HOSPITAL ENCOUNTER (OUTPATIENT)
Dept: PHYSICAL THERAPY | Facility: CLINIC | Age: 79
Setting detail: THERAPIES SERIES
End: 2019-04-30
Attending: SURGERY
Payer: MEDICARE

## 2019-04-30 PROCEDURE — 97116 GAIT TRAINING THERAPY: CPT | Mod: GP | Performed by: PHYSICAL THERAPIST

## 2019-04-30 PROCEDURE — 97112 NEUROMUSCULAR REEDUCATION: CPT | Mod: GP | Performed by: PHYSICAL THERAPIST

## 2019-05-01 ENCOUNTER — ANTICOAGULATION THERAPY VISIT (OUTPATIENT)
Dept: ANTICOAGULATION | Facility: CLINIC | Age: 79
End: 2019-05-01

## 2019-05-01 DIAGNOSIS — Z79.01 LONG TERM CURRENT USE OF ANTICOAGULANT THERAPY: ICD-10-CM

## 2019-05-01 DIAGNOSIS — I48.0 PAROXYSMAL ATRIAL FIBRILLATION (H): ICD-10-CM

## 2019-05-01 DIAGNOSIS — I48.20 CHRONIC ATRIAL FIBRILLATION (H): ICD-10-CM

## 2019-05-01 LAB — INR BLD: 3.2 (ref 0.86–1.14)

## 2019-05-01 PROCEDURE — 36416 COLLJ CAPILLARY BLOOD SPEC: CPT | Performed by: INTERNAL MEDICINE

## 2019-05-01 PROCEDURE — 85610 PROTHROMBIN TIME: CPT | Mod: QW | Performed by: INTERNAL MEDICINE

## 2019-05-01 NOTE — PROGRESS NOTES
ANTICOAGULATION FOLLOW-UP CLINIC VISIT    Patient Name:  Tad Manning  Date:  5/1/2019  Contact Type:  Telephone    SUBJECTIVE:        OBJECTIVE    INR Point of Care   Date Value Ref Range Status   05/01/2019 3.2 (H) 0.86 - 1.14 Final     Comment:     This test is intended for monitoring Coumadin therapy.  Results are not   accurate in patients with prolonged INR due to factor deficiency.         ASSESSMENT / PLAN  No question data found.  Anticoagulation Summary  As of 5/1/2019    INR goal:   2.0-3.0   TTR:   49.8 % (1.1 y)   INR used for dosing:   3.2! (5/1/2019)   Warfarin maintenance plan:   7.5 mg (5 mg x 1.5) every Mon, Wed, Fri; 5 mg (5 mg x 1) all other days   Full warfarin instructions:   7.5 mg every Mon, Wed, Fri; 5 mg all other days   Weekly warfarin total:   42.5 mg   No change documented:   Vicki Hearn RN   Plan last modified:   Vicki Hearn RN (7/31/2018)   Next INR check:   5/15/2019   Priority:   INR   Target end date:   Indefinite    Indications    Atrial fibrillation -- on Warfarin [I48.91]  Long-term (current) use of anticoagulants [Z79.01] [Z79.01]             Anticoagulation Episode Summary     INR check location:       Preferred lab:       Send INR reminders to:   [facility] CL[facility]IC    Comments:   HIPPA Infor returned. OK to  on cell [phone]. OK to  with Seferino Manning [phone]  labs @ [facility] [place] faxed there 2/16.  fax: [phone]    Goes by [employer]      Anticoagulation Care Providers     Provider Role Specialty Phone number    Clarisse Valdes MD Responsible Cardiology [phone]            See the Encounter Report to view Anticoagulation Flowsheet and Dosing Calendar (Go to Encounters tab in chart review, and find the Anticoagulation Therapy Visit)    Spoke with patient.     Vicki Hearn RN

## 2019-05-06 ENCOUNTER — HOSPITAL ENCOUNTER (OUTPATIENT)
Dept: PHYSICAL THERAPY | Facility: CLINIC | Age: 79
Setting detail: THERAPIES SERIES
End: 2019-05-06
Attending: SURGERY
Payer: MEDICARE

## 2019-05-06 PROCEDURE — 97116 GAIT TRAINING THERAPY: CPT | Mod: GP | Performed by: PHYSICAL THERAPIST

## 2019-05-06 PROCEDURE — 97110 THERAPEUTIC EXERCISES: CPT | Mod: GP | Performed by: PHYSICAL THERAPIST

## 2019-05-06 PROCEDURE — 97112 NEUROMUSCULAR REEDUCATION: CPT | Mod: GP | Performed by: PHYSICAL THERAPIST

## 2019-05-14 ENCOUNTER — HOSPITAL ENCOUNTER (OUTPATIENT)
Dept: PHYSICAL THERAPY | Facility: CLINIC | Age: 79
Setting detail: THERAPIES SERIES
End: 2019-05-14
Attending: SURGERY
Payer: MEDICARE

## 2019-05-14 PROCEDURE — 97112 NEUROMUSCULAR REEDUCATION: CPT | Mod: GP | Performed by: PHYSICAL THERAPIST

## 2019-05-14 PROCEDURE — 97116 GAIT TRAINING THERAPY: CPT | Mod: GP | Performed by: PHYSICAL THERAPIST

## 2019-05-17 ENCOUNTER — ANTICOAGULATION THERAPY VISIT (OUTPATIENT)
Dept: ANTICOAGULATION | Facility: CLINIC | Age: 79
End: 2019-05-17

## 2019-05-17 DIAGNOSIS — I48.0 PAROXYSMAL ATRIAL FIBRILLATION (H): ICD-10-CM

## 2019-05-17 DIAGNOSIS — I48.20 CHRONIC ATRIAL FIBRILLATION (H): ICD-10-CM

## 2019-05-17 DIAGNOSIS — Z79.01 LONG TERM CURRENT USE OF ANTICOAGULANT THERAPY: ICD-10-CM

## 2019-05-17 LAB — INR BLD: 2.4 (ref 0.86–1.14)

## 2019-05-17 PROCEDURE — 85610 PROTHROMBIN TIME: CPT | Mod: QW | Performed by: INTERNAL MEDICINE

## 2019-05-17 PROCEDURE — 36416 COLLJ CAPILLARY BLOOD SPEC: CPT | Performed by: INTERNAL MEDICINE

## 2019-05-17 RX ORDER — WARFARIN SODIUM 5 MG/1
TABLET ORAL
Qty: 90 TABLET | Refills: 3 | Status: SHIPPED | OUTPATIENT
Start: 2019-05-17 | End: 2019-08-12

## 2019-05-17 NOTE — PROGRESS NOTES
ANTICOAGULATION FOLLOW-UP CLINIC VISIT    Patient Name:  Tad Manning  Date:  2019  Contact Type:  Telephone    SUBJECTIVE:         OBJECTIVE    INR Point of Care   Date Value Ref Range Status   2019 2.4 (H) 0.86 - 1.14 Final     Comment:     This test is intended for monitoring Coumadin therapy.  Results are not   accurate in patients with prolonged INR due to factor deficiency.         ASSESSMENT / PLAN  INR assessment THER    Recheck INR In: 4 WEEKS    INR Location Clinic      Anticoagulation Summary  As of 2019    INR goal:   2.0-3.0   TTR:   50.8 % (1.1 y)   INR used for dosin.4 (2019)   Warfarin maintenance plan:   7.5 mg (5 mg x 1.5) every Mon, Wed, Fri; 5 mg (5 mg x 1) all other days   Full warfarin instructions:   7.5 mg every Mon, Wed, Fri; 5 mg all other days   Weekly warfarin total:   42.5 mg   No change documented:   Darien Ballard RN   Plan last modified:   Vicki Hearn RN (2018)   Next INR check:   2019   Priority:   INR   Target end date:   Indefinite    Indications    Atrial fibrillation -- on Warfarin [I48.91]  Long-term (current) use of anticoagulants [Z79.01] [Z79.01]             Anticoagulation Episode Summary     INR check location:       Preferred lab:       Send INR reminders to:   [facility]    Comments:   HIPPA Infor returned. OK to  on cell [phone]. OK to  with Seferino Nigel [phone]  labs @ [facility] [place] faxed there .  fax: [phone]    Goes by [employer]      Anticoagulation Care Providers     Provider Role Specialty Phone number    Clarisse Valdes MD Responsible Cardiology [phone]            See the Encounter Report to view Anticoagulation Flowsheet and Dosing Calendar (Go to Encounters tab in chart review, and find the Anticoagulation Therapy Visit)    Spoke with patient. Gave them their lab results and new warfarin recommendation.  No changes in health, medication, or diet. No missed doses, no  falls. No signs or symptoms of bleed or clotting.    Sent Warfarin 5mg tablets to [facility] with refill.    Darien Ballard, RN

## 2019-05-21 ENCOUNTER — HOSPITAL ENCOUNTER (OUTPATIENT)
Dept: PHYSICAL THERAPY | Facility: CLINIC | Age: 79
Setting detail: THERAPIES SERIES
End: 2019-05-21
Attending: SURGERY
Payer: MEDICARE

## 2019-05-21 PROCEDURE — 97112 NEUROMUSCULAR REEDUCATION: CPT | Mod: GP | Performed by: PHYSICAL THERAPIST

## 2019-05-21 PROCEDURE — 97116 GAIT TRAINING THERAPY: CPT | Mod: GP | Performed by: PHYSICAL THERAPIST

## 2019-05-21 PROCEDURE — 97110 THERAPEUTIC EXERCISES: CPT | Mod: GP | Performed by: PHYSICAL THERAPIST

## 2019-05-28 ENCOUNTER — HOSPITAL ENCOUNTER (OUTPATIENT)
Dept: PHYSICAL THERAPY | Facility: CLINIC | Age: 79
Setting detail: THERAPIES SERIES
End: 2019-05-28
Attending: SURGERY
Payer: MEDICARE

## 2019-05-28 PROCEDURE — 97110 THERAPEUTIC EXERCISES: CPT | Mod: GP | Performed by: PHYSICAL THERAPIST

## 2019-05-28 PROCEDURE — 97116 GAIT TRAINING THERAPY: CPT | Mod: GP | Performed by: PHYSICAL THERAPIST

## 2019-06-04 ENCOUNTER — HOSPITAL ENCOUNTER (OUTPATIENT)
Dept: PHYSICAL THERAPY | Facility: CLINIC | Age: 79
Setting detail: THERAPIES SERIES
End: 2019-06-04
Attending: SURGERY
Payer: MEDICARE

## 2019-06-04 PROCEDURE — 97110 THERAPEUTIC EXERCISES: CPT | Mod: GP | Performed by: PHYSICAL THERAPIST

## 2019-06-11 ENCOUNTER — HOSPITAL ENCOUNTER (OUTPATIENT)
Dept: PHYSICAL THERAPY | Facility: CLINIC | Age: 79
Setting detail: THERAPIES SERIES
End: 2019-06-11
Attending: SURGERY
Payer: MEDICARE

## 2019-06-11 PROCEDURE — 97750 PHYSICAL PERFORMANCE TEST: CPT | Mod: GP | Performed by: PHYSICAL THERAPIST

## 2019-06-11 PROCEDURE — 97116 GAIT TRAINING THERAPY: CPT | Mod: GP | Performed by: PHYSICAL THERAPIST

## 2019-06-11 NOTE — PROGRESS NOTES
06/11/19 0900   Signing Clinician's Name / Credentials   Signing clinician's name / credentials Aysha Martin MPT   Dynamic Gait Index (Uday and Calderón Rock, 1995)   Gait Level Surface 2   Change in Gait Speed 1   Gait and Horizontal Head Turns 1   Gait with Vertical Head Turns 2   Gait and Pivot Turns 2   Step Over Obstacle 2   Step Around Obstacles 3   Steps 1   Total Dynamic Gait Index Score  (A score of 19 or less has been correlated to an increased risk of falls in community dwelling older adults, patients with vestibular disorders, and patients with MS.)   Total Score (out of 24) 14

## 2019-06-20 ENCOUNTER — OFFICE VISIT (OUTPATIENT)
Dept: PODIATRY | Facility: CLINIC | Age: 79
End: 2019-06-20
Payer: MEDICARE

## 2019-06-20 VITALS
WEIGHT: 185 LBS | HEIGHT: 72 IN | DIASTOLIC BLOOD PRESSURE: 72 MMHG | BODY MASS INDEX: 25.06 KG/M2 | SYSTOLIC BLOOD PRESSURE: 128 MMHG

## 2019-06-20 DIAGNOSIS — G62.9 PERIPHERAL POLYNEUROPATHY: ICD-10-CM

## 2019-06-20 DIAGNOSIS — N18.3 TYPE 2 DIABETES MELLITUS WITH STAGE 3 CHRONIC KIDNEY DISEASE, UNSPECIFIED WHETHER LONG TERM INSULIN USE: ICD-10-CM

## 2019-06-20 DIAGNOSIS — E11.22 TYPE 2 DIABETES MELLITUS WITH STAGE 3 CHRONIC KIDNEY DISEASE, UNSPECIFIED WHETHER LONG TERM INSULIN USE: ICD-10-CM

## 2019-06-20 DIAGNOSIS — B07.0 PLANTAR WART, LEFT FOOT: ICD-10-CM

## 2019-06-20 DIAGNOSIS — B35.1 ONYCHOMYCOSIS: Primary | ICD-10-CM

## 2019-06-20 PROCEDURE — 99214 OFFICE O/P EST MOD 30 MIN: CPT | Performed by: PODIATRIST

## 2019-06-20 ASSESSMENT — MIFFLIN-ST. JEOR: SCORE: 1607.15

## 2019-06-20 NOTE — PROGRESS NOTES
Foot & Ankle Surgery   June 20, 2019    S:  Pt is seen today for evaluation of L 3rd toe. Previous plantar wart, decided to monitor and is in today for scheduled follow up.  He recently got his nail caught on a sock and had some bleeding.  There is some dried blood around the nail and in the wart but no open wound, no drainage and no SOI.  He wonders why his 4th nail is white.    Vitals:    06/20/19 1110   BP: 128/72   Weight: 83.9 kg (185 lb)   Height: 1.829 m (6')   '      ROS - Pos for CC.  Patient denies current nausea, vomiting, chills, fevers, belly pain, calf pain, chest pain or SOB.  Complete remainder of ROS it otherwise neg.      PE:  Gen:   No apparent distress  Eye:    Visual scanning without deficit  Ear:    Response to auditory stimuli wnl  Lung:    Non-labored breathing on RA noted  Abd:    NTND per patient report  Lymph:    Neg for pitting/non-pitting edema BLE  Vasc:    Pulses palpable, CFT minimally delayed  Neuro:    Light touch sensation intact to all sensory nerve distributions without paresthesias  Derm:    Verrucous-type lesion distal L 3rd toe.  Dried blood around/within the wart and at nail bed, but no open wound, no drainage, no SOI.  Onychomycosis multiple nails, including 4th L toenail.  MSK:    L 2nd toe amp.  Right lower extremity BK amp  Calf:    Neg for redness, swelling or tenderness    Assessment:  78 year old male with dried blood around L 3rd toe wart; onychomycosis left lower extremity       Plan:  Discussed etiologies, anatomy and options  1.  Dried blood around/within the wart L 3rd toe/nail bed  -debrided prominent nail to prevent further trauma; no charge  -the wart appears larger but is not clinically causing any problems  -discussed treatment options vs monitoring; will continue monitoring  -regarding the dried blood, there is no acute wound, inflammation or SOI.  No wound care or PO abx indicated  -advised he monitor the area closely, and call/ follow up with any  concerns/deterioration.      2.  Onychomycosis left lower extremity, including 4th toenail  -discussed treatment options, including PO vs topical, as well as treatment course.  Labor-intensive and unfortunately the outcomes are not that good.  Discussed option o fmonitoring, which is what he'd like to do     Follow up:  prn or sooner with acute issues      Body mass index is 25.09 kg/m .           Enzo Alonso DPM FACFAS FACFAOM  Podiatric Foot & Ankle Surgeon  [facility]  [phone]

## 2019-06-20 NOTE — PATIENT INSTRUCTIONS
Thank you for choosing [facility]!    DR. DURÁN'S CLINIC LOCATIONS:   MONDAY - [place] TUESDAY - [place]   [address] [facility]  [address]   [place] [address] [place] [address]   [phone]       THURSDAY AM - [place] THURSDAY PM - [place]   [address]   [place] [address] [place], [place] [address] [phone]       FRIDAY AM - [place] SET UP SURGERY: [phone] [address] APPOINTMENTS: [phone]   [place] [address] BILLING QUESTIONS: [phone] FAX NUMBER: [phone]     Dr. Durán will be out of the office on paternity leave from June 25 - July 10.    Follow Up:  As needed        BODY WEIGHT AND YOUR FEET  The following information is included in the after visit summary for all patients. Body weight can be a sensitive issue to discuss in clinic, but we think the following information is very important. Although we focus on the feet and ankles, we do support the overall health of our patients.     Many things can cause foot and ankle problems. Foot structure, activity level, foot mechanics and injuries are common causes of pain. One very important issue that often goes unmentioned, is body weight. Extra weight can cause increased stress on muscles, ligaments, bones and tendons. Sometimes just a few extra pounds is all it takes to put one over her/his threshold. Without reducing that stress, it can be difficult to alleviate pain. As Foot & Ankle specialists, our job is addressing the lower extremity problem and possible causes. Regarding extra body weight, we encourage patients to discuss diet and weight management plans with their primary care doctors. It is this team approach that gives you the best opportunity for pain relief and getting you back on your feet.      [facility] has a Comprehensive Weight Management Program. This program includes counseling,  education, non-surgical and surgical approaches to weight loss. If you are interested in learning more either talk to you primary care provider or call [phone].

## 2019-06-25 ENCOUNTER — ANTICOAGULATION THERAPY VISIT (OUTPATIENT)
Dept: ANTICOAGULATION | Facility: CLINIC | Age: 79
End: 2019-06-25

## 2019-06-25 DIAGNOSIS — Z79.01 LONG TERM CURRENT USE OF ANTICOAGULANT THERAPY: ICD-10-CM

## 2019-06-25 DIAGNOSIS — I73.9 CLAUDICATION OF LEFT LOWER EXTREMITY (H): ICD-10-CM

## 2019-06-25 DIAGNOSIS — I48.0 PAROXYSMAL ATRIAL FIBRILLATION (H): ICD-10-CM

## 2019-06-25 DIAGNOSIS — I48.20 CHRONIC ATRIAL FIBRILLATION (H): ICD-10-CM

## 2019-06-25 LAB — INR BLD: 3.9 (ref 0.86–1.14)

## 2019-06-25 PROCEDURE — 36416 COLLJ CAPILLARY BLOOD SPEC: CPT | Performed by: INTERNAL MEDICINE

## 2019-06-25 PROCEDURE — 85610 PROTHROMBIN TIME: CPT | Mod: QW | Performed by: INTERNAL MEDICINE

## 2019-06-25 NOTE — PROGRESS NOTES
ANTICOAGULATION FOLLOW-UP CLINIC VISIT    Patient Name:  Tad Manning  Date:  6/25/2019  Contact Type:  Telephone    SUBJECTIVE:  Patient Findings     Comments:   Patient reported they were taking 7.5 mg dose twice a week (Monday and Friday) and 5 mg rest of week. Recently began taking trazodone  mg once daily 2 weeks ago.        Clinical Outcomes     Comments:   Patient reported they were taking 7.5 mg dose twice a week (Monday and Friday) and 5 mg rest of week. Recently began taking trazodone  mg once daily 2 weeks ago.           OBJECTIVE    INR Point of Care   Date Value Ref Range Status   06/25/2019 3.9 (H) 0.86 - 1.14 Final     Comment:     This test is intended for monitoring Coumadin therapy.  Results are not   accurate in patients with prolonged INR due to factor deficiency.         ASSESSMENT / PLAN  INR assessment SUPRA    Recheck INR In: 2 WEEKS    INR Location Clinic      Anticoagulation Summary  As of 6/25/2019    INR goal:   2.0-3.0   TTR:   49.8 % (1.2 y)   INR used for dosing:   3.9! (6/25/2019)   Warfarin maintenance plan:   7.5 mg (5 mg x 1.5) every Mon; 5 mg (5 mg x 1) all other days   Full warfarin instructions:   6/25: Hold; Otherwise 7.5 mg every Mon; 5 mg all other days   Weekly warfarin total:   37.5 mg   Plan last modified:   Marlen Dill (6/25/2019)   Next INR check:   7/9/2019   Priority:   INR   Target end date:   Indefinite    Indications    Atrial fibrillation -- on Warfarin [I48.91]  Long-term (current) use of anticoagulants [Z79.01] [Z79.01]             Anticoagulation Episode Summary     INR check location:       Preferred lab:       Send INR reminders to:   [facility]    Comments:   HIPPA Infor returned. OK to  on cell [phone]. OK to  with Seferino Nigel [phone]  labs @ [facility] faxed there 2/16.  fax: [phone]    Goes by [facility]      Anticoagulation Care Providers     Provider Role Specialty Phone number    Clarisse Valdes  MD ARJUN Responsible Cardiology [phone]            See the Encounter Report to view Anticoagulation Flowsheet and Dosing Calendar (Go to Encounters tab in chart review, and find the Anticoagulation Therapy Visit)    Spoke with patient. Gave them their lab results and new warfarin recommendation.  No changes in health, or diet. Patient reported they began taking trazodone for sleep every night. No missed doses, no falls. No signs or symptoms of bleed or clotting.    Marlen Dill

## 2019-06-28 RX ORDER — ATORVASTATIN CALCIUM 20 MG/1
20 TABLET, FILM COATED ORAL DAILY
Qty: 90 TABLET | Refills: 0 | Status: SHIPPED | OUTPATIENT
Start: 2019-06-28 | End: 2023-01-01

## 2019-06-28 NOTE — TELEPHONE ENCOUNTER
Last Clinic Visit: 4/1/2019  [facility]  LDL overdue - clinic notified

## 2019-06-29 NOTE — PLAN OF CARE
Problem: Laminectomy/Foraminotomy/Discectomy (Adult)  Goal: Signs and Symptoms of Listed Potential Problems Will be Absent or Manageable (Laminectomy/Foraminotomy/Discectomy)  Signs and symptoms of listed potential problems will be absent or manageable by discharge/transition of care (reference Laminectomy/Foraminotomy/Discectomy (Adult) CPG).   Outcome: Improving  S: Pt returned post op at 1340.   B: VSS on 2L NC, unable to wean. Oriented x4. Intermittently illogical. BLE strength is steadily increasing; pt able to lift legs off bed 3/5 strength, moderate dorsi/plantar flexion. Pt has sensation lower in legs than when I saw him pre operative. Pain managed with oxycodone and tylenol, able to reposition in bed without great discomfort with assistance of 1 nurse. Tolerates clear liquids, no nausea. Surgical dressing has marked dried drainage. Dugan with clear output.   A: Pain managed with oral medications. Turn and repo q2h.            There are no preventive care reminders to display for this patient.    Patient is up to date, no discussion needed.

## 2019-07-02 ENCOUNTER — HOSPITAL ENCOUNTER (OUTPATIENT)
Dept: PHYSICAL THERAPY | Facility: CLINIC | Age: 79
Setting detail: THERAPIES SERIES
End: 2019-07-02
Attending: SURGERY
Payer: MEDICARE

## 2019-07-02 PROCEDURE — 97116 GAIT TRAINING THERAPY: CPT | Mod: GP | Performed by: PHYSICAL THERAPIST

## 2019-07-16 ENCOUNTER — HOSPITAL ENCOUNTER (OUTPATIENT)
Dept: PHYSICAL THERAPY | Facility: CLINIC | Age: 79
Setting detail: THERAPIES SERIES
End: 2019-07-16
Attending: SURGERY
Payer: MEDICARE

## 2019-07-16 PROCEDURE — 97110 THERAPEUTIC EXERCISES: CPT | Mod: GP | Performed by: PHYSICAL THERAPIST

## 2019-07-24 ENCOUNTER — ANTICOAGULATION THERAPY VISIT (OUTPATIENT)
Dept: ANTICOAGULATION | Facility: CLINIC | Age: 79
End: 2019-07-24

## 2019-07-24 DIAGNOSIS — I48.0 PAROXYSMAL ATRIAL FIBRILLATION (H): ICD-10-CM

## 2019-07-24 DIAGNOSIS — Z79.01 LONG TERM CURRENT USE OF ANTICOAGULANT THERAPY: ICD-10-CM

## 2019-07-24 DIAGNOSIS — I48.20 CHRONIC ATRIAL FIBRILLATION (H): ICD-10-CM

## 2019-07-24 LAB — INR BLD: 3.3 (ref 0.86–1.14)

## 2019-07-24 PROCEDURE — 36416 COLLJ CAPILLARY BLOOD SPEC: CPT | Performed by: INTERNAL MEDICINE

## 2019-07-24 PROCEDURE — 85610 PROTHROMBIN TIME: CPT | Mod: QW | Performed by: INTERNAL MEDICINE

## 2019-07-24 NOTE — PROGRESS NOTES
ANTICOAGULATION FOLLOW-UP CLINIC VISIT    Patient Name:  Tad Manning  Date:  7/24/2019  Contact Type:  Telephone    SUBJECTIVE:         OBJECTIVE    INR Point of Care   Date Value Ref Range Status   07/24/2019 3.3 (H) 0.86 - 1.14 Final     Comment:     This test is intended for monitoring Coumadin therapy.  Results are not   accurate in patients with prolonged INR due to factor deficiency.         ASSESSMENT / PLAN  No question data found.  Anticoagulation Summary  As of 7/24/2019    INR goal:   2.0-3.0   TTR:   46.8 % (1.3 y)   INR used for dosing:   3.3! (7/24/2019)   Warfarin maintenance plan:   5 mg (5 mg x 1) every day   Full warfarin instructions:   5 mg every day   Weekly warfarin total:   35 mg   Plan last modified:   Vicki Hearn RN (7/24/2019)   Next INR check:   8/12/2019   Priority:   INR   Target end date:   Indefinite    Indications    Atrial fibrillation -- on Warfarin [I48.91]  Long-term (current) use of anticoagulants [Z79.01] [Z79.01]             Anticoagulation Episode Summary     INR check location:       Preferred lab:       Send INR reminders to:   [facility]    Comments:   HIPPA Infor returned. OK to  on cell [phone]. OK to  with Seferino Manning [phone]  labs @ [facility] faxed there 2/16.  fax: [phone]    Goes by [facility]ima      Anticoagulation Care Providers     Provider Role Specialty Phone number    Clarisse Valdes MD [facility] Cardiology [phone]            See the Encounter Report to view Anticoagulation Flowsheet and Dosing Calendar (Go to Encounters tab in chart review, and find the Anticoagulation Therapy Visit)    Left message for patient with results and dosing recommendations. Asked patient to call back to report any missed doses, falls, signs and symptoms of bleeding or clotting, any changes in health, medication, or diet. Asked patient to call back with any questions or concerns.     Vicki Hearn RN

## 2019-08-06 DIAGNOSIS — I50.22 CHRONIC SYSTOLIC HEART FAILURE (H): Primary | ICD-10-CM

## 2019-08-08 ENCOUNTER — ANTICOAGULATION THERAPY VISIT (OUTPATIENT)
Dept: ANTICOAGULATION | Facility: CLINIC | Age: 79
End: 2019-08-08

## 2019-08-08 DIAGNOSIS — I48.0 PAROXYSMAL ATRIAL FIBRILLATION (H): ICD-10-CM

## 2019-08-08 DIAGNOSIS — Z79.01 LONG TERM CURRENT USE OF ANTICOAGULANT THERAPY: ICD-10-CM

## 2019-08-08 DIAGNOSIS — I48.20 CHRONIC ATRIAL FIBRILLATION (H): ICD-10-CM

## 2019-08-08 LAB — INR BLD: 2.5 (ref 0.86–1.14)

## 2019-08-08 PROCEDURE — 85610 PROTHROMBIN TIME: CPT | Mod: QW | Performed by: INTERNAL MEDICINE

## 2019-08-08 PROCEDURE — 36416 COLLJ CAPILLARY BLOOD SPEC: CPT | Performed by: INTERNAL MEDICINE

## 2019-08-08 NOTE — PROGRESS NOTES
ANTICOAGULATION FOLLOW-UP CLINIC VISIT    Patient Name:  Tad Manning  Date:  2019  Contact Type:  Telephone    SUBJECTIVE:  Patient Findings     Comments:   Patient reports starting pantoprazole a few days ago.  According to the literature this can have a moderate affect of increasing the INR.  Patient also reports that he went back to taking 7.5mg Mon and Fr and 5mg all other days.         Clinical Outcomes     Comments:   Patient reports starting pantoprazole a few days ago.  According to the literature this can have a moderate affect of increasing the INR.  Patient also reports that he went back to taking 7.5mg Mon and Fr and 5mg all other days.            OBJECTIVE    INR Point of Care   Date Value Ref Range Status   2019 2.5 (H) 0.86 - 1.14 Final     Comment:     This test is intended for monitoring Coumadin therapy.  Results are not   accurate in patients with prolonged INR due to factor deficiency.         ASSESSMENT / PLAN  No question data found.  Anticoagulation Summary  As of 2019    INR goal:   2.0-3.0   TTR:   47.2 % (1.3 y)   INR used for dosin.5 (2019)   Warfarin maintenance plan:   7.5 mg (5 mg x 1.5) every Mon, Fri; 5 mg (5 mg x 1) all other days   Full warfarin instructions:   7.5 mg every Mon, Fri; 5 mg all other days   Weekly warfarin total:   40 mg   Plan last modified:   Vicki Hearn RN (2019)   Next INR check:   2019   Priority:   INR   Target end date:   Indefinite    Indications    Atrial fibrillation -- on Warfarin [I48.91]  Long-term (current) use of anticoagulants [Z79.01] [Z79.01]             Anticoagulation Episode Summary     INR check location:       Preferred lab:       Send INR reminders to:   [facility]    Comments:   HIPPA Infor returned. OK to  on cell [phone]. OK to  with Seferino Manning [phone]  labs @ [facility] faxed there .  fax: [phone]    Goes by Hima      Anticoagulation Care Providers      Provider Role Specialty Phone number    Clarisse Valdes MD Responsible Cardiology [phone]            See the Encounter Report to view Anticoagulation Flowsheet and Dosing Calendar (Go to Encounters tab in chart review, and find the Anticoagulation Therapy Visit)    Spoke with patient.     Vicki Hearn RN

## 2019-08-12 ENCOUNTER — ANTICOAGULATION THERAPY VISIT (OUTPATIENT)
Dept: ANTICOAGULATION | Facility: CLINIC | Age: 79
End: 2019-08-12

## 2019-08-12 ENCOUNTER — OFFICE VISIT (OUTPATIENT)
Dept: CARDIOLOGY | Facility: CLINIC | Age: 79
End: 2019-08-12
Attending: INTERNAL MEDICINE
Payer: MEDICARE

## 2019-08-12 VITALS
SYSTOLIC BLOOD PRESSURE: 127 MMHG | OXYGEN SATURATION: 97 % | DIASTOLIC BLOOD PRESSURE: 69 MMHG | HEIGHT: 71 IN | WEIGHT: 171 LBS | HEART RATE: 61 BPM | BODY MASS INDEX: 23.94 KG/M2

## 2019-08-12 DIAGNOSIS — Z79.01 LONG TERM CURRENT USE OF ANTICOAGULANT THERAPY: ICD-10-CM

## 2019-08-12 DIAGNOSIS — I10 ESSENTIAL HYPERTENSION: ICD-10-CM

## 2019-08-12 DIAGNOSIS — I48.0 PAROXYSMAL ATRIAL FIBRILLATION (H): ICD-10-CM

## 2019-08-12 DIAGNOSIS — I48.20 CHRONIC ATRIAL FIBRILLATION (H): ICD-10-CM

## 2019-08-12 DIAGNOSIS — I25.5 ISCHEMIC CARDIOMYOPATHY: ICD-10-CM

## 2019-08-12 DIAGNOSIS — I50.22 CHRONIC SYSTOLIC HEART FAILURE (H): ICD-10-CM

## 2019-08-12 DIAGNOSIS — I50.23 ACUTE ON CHRONIC SYSTOLIC CONGESTIVE HEART FAILURE (H): ICD-10-CM

## 2019-08-12 DIAGNOSIS — I50.22 CHRONIC SYSTOLIC CONGESTIVE HEART FAILURE (H): Primary | ICD-10-CM

## 2019-08-12 LAB
ANION GAP SERPL CALCULATED.3IONS-SCNC: 5 MMOL/L (ref 3–14)
BUN SERPL-MCNC: 23 MG/DL (ref 7–30)
CALCIUM SERPL-MCNC: 9 MG/DL (ref 8.5–10.1)
CHLORIDE SERPL-SCNC: 105 MMOL/L (ref 94–109)
CO2 SERPL-SCNC: 28 MMOL/L (ref 20–32)
CREAT SERPL-MCNC: 1.08 MG/DL (ref 0.66–1.25)
GFR SERPL CREATININE-BSD FRML MDRD: 66 ML/MIN/{1.73_M2}
GLUCOSE SERPL-MCNC: 150 MG/DL (ref 70–99)
INR PPP: 2.22 (ref 0.86–1.14)
POTASSIUM SERPL-SCNC: 4.5 MMOL/L (ref 3.4–5.3)
SODIUM SERPL-SCNC: 138 MMOL/L (ref 133–144)

## 2019-08-12 PROCEDURE — 36415 COLL VENOUS BLD VENIPUNCTURE: CPT | Performed by: INTERNAL MEDICINE

## 2019-08-12 PROCEDURE — 99214 OFFICE O/P EST MOD 30 MIN: CPT | Mod: ZP | Performed by: INTERNAL MEDICINE

## 2019-08-12 PROCEDURE — 85610 PROTHROMBIN TIME: CPT | Performed by: INTERNAL MEDICINE

## 2019-08-12 PROCEDURE — G0463 HOSPITAL OUTPT CLINIC VISIT: HCPCS | Mod: 25,ZF

## 2019-08-12 PROCEDURE — 93005 ELECTROCARDIOGRAM TRACING: CPT | Mod: ZF

## 2019-08-12 PROCEDURE — 93010 ELECTROCARDIOGRAM REPORT: CPT | Mod: ZP | Performed by: INTERNAL MEDICINE

## 2019-08-12 PROCEDURE — 80048 BASIC METABOLIC PNL TOTAL CA: CPT | Performed by: INTERNAL MEDICINE

## 2019-08-12 RX ORDER — PANTOPRAZOLE SODIUM 20 MG/1
40 TABLET, DELAYED RELEASE ORAL DAILY
Status: ON HOLD | COMMUNITY
End: 2020-01-09

## 2019-08-12 RX ORDER — TRAZODONE HYDROCHLORIDE 100 MG/1
100 TABLET ORAL AT BEDTIME
Status: ON HOLD | COMMUNITY
End: 2020-01-09

## 2019-08-12 ASSESSMENT — ENCOUNTER SYMPTOMS
PARALYSIS: 0
FLANK PAIN: 0
SEIZURES: 0
HEADACHES: 0
LOSS OF CONSCIOUSNESS: 0
SWOLLEN GLANDS: 0
MEMORY LOSS: 1
TINGLING: 0
WEAKNESS: 0
BRUISES/BLEEDS EASILY: 1
DIFFICULTY URINATING: 0
HEMATURIA: 0
DYSURIA: 0
DIZZINESS: 1
NUMBNESS: 1
SPEECH CHANGE: 0
TREMORS: 0
DISTURBANCES IN COORDINATION: 0

## 2019-08-12 ASSESSMENT — MIFFLIN-ST. JEOR: SCORE: 1519.84

## 2019-08-12 ASSESSMENT — PAIN SCALES - GENERAL: PAINLEVEL: NO PAIN (0)

## 2019-08-12 NOTE — PROGRESS NOTES
ANTICOAGULATION FOLLOW-UP CLINIC VISIT    Patient Name:  Tad Manning  Date:  2019  Contact Type:  Telephone    SUBJECTIVE:         OBJECTIVE    INR   Date Value Ref Range Status   2019 2.22 (H) 0.86 - 1.14 Final       ASSESSMENT / PLAN  INR assessment THER    Recheck INR In: 2 WEEKS    INR Location Clinic      Anticoagulation Summary  As of 2019    INR goal:   2.0-3.0   TTR:   47.7 % (1.3 y)   INR used for dosin.22 (2019)   Warfarin maintenance plan:   7.5 mg (5 mg x 1.5) every Mon, Fri; 5 mg (5 mg x 1) all other days   Full warfarin instructions:   7.5 mg every Mon, Fri; 5 mg all other days   Weekly warfarin total:   40 mg   Plan last modified:   Vicki Hearn RN (2019)   Next INR check:   2019   Priority:   INR   Target end date:   Indefinite    Indications    Atrial fibrillation -- on Warfarin [I48.91]  Long-term (current) use of anticoagulants [Z79.01] [Z79.01]             Anticoagulation Episode Summary     INR check location:       Preferred lab:       Send INR reminders to:   [facility]    Comments:   HIPPA Infor returned. OK to  on cell [phone]. OK to  with Seferino Manning [phone]  labs @ [facility] faxed there .  fax: [phone]    Goes by Hima      Anticoagulation Care Providers     Provider Role Specialty Phone number    Clarisse Valdes MD [facility] Cardiology [phone]            See the Encounter Report to view Anticoagulation Flowsheet and Dosing Calendar (Go to Encounters tab in chart review, and find the Anticoagulation Therapy Visit)  Left message for patient with results and dosing recommendations. Asked patient to call back to report any missed doses, falls, signs and symptoms of bleeding or clotting, any changes in health, medication, or diet. Asked patient to call back with any questions or concerns.      Chelsea Bland RN

## 2019-08-12 NOTE — PROGRESS NOTES
Cardiology Clinic Note  08/12/2019    HPI:   Mr. Tad Manning is a 78-year old male with a past medical history of CLL in remission, CAD s/p CABG in 1995, HFrEF, atrial flutter, PVD s/p iliac artery stents and left lower extremity bypass, hypertension, diabetes, hyperlipidemia, discitis and multple admission for decompensated ICM/HFrEF who presents for follow-up of cardiomyopathy.     He underwent a LHC in March 2018 that showed moderate disease in his RCA, severe LAD disease and patent grafts and had two drug eluting stents placed in the proximal and mid LAD. RHC showed elevated right and left sided pressures with type II pulmonary hypertension. He then underwent a R leg below knee amputation in Oct 2018.      Interval History:  Since his last visit, in April 2019, he feels much better. He is getting used to the prosthesis in the left leg. He denies chest pain, new ALEXIS, palpitations, lightheadedness or dizziness. Exercise capacity is limited.    ROS:  A complete 10-pt ROS was negative except as above.    Cardiac meds  - furosemide 20 mg/d  - lisinopril 5 mg/d  - digoxin 125 mcg daily  - warfarin  - metoprolol  mg daily  - atorvastatin 20 mg daily  - Nitro PRN    Current Outpatient Medications   Medication Sig Dispense Refill     Acetaminophen (TYLENOL PO) Take 1,000 mg by mouth every 6 hours       aspirin (ASA) 81 MG tablet Take 1 tablet (81 mg) by mouth daily       atorvastatin (LIPITOR) 20 MG tablet Take 1 tablet (20 mg) by mouth daily 90 tablet 0     cholecalciferol 1000 UNITS TABS Take 1,000 Units by mouth daily 30 tablet      Citalopram Hydrobromide (CELEXA PO) Take 10 mg by mouth daily       digoxin (LANOXIN) 125 MCG tablet Take 1 tablet (125 mcg) by mouth daily 90 tablet 3     ferrous sulfate (IRON) 325 (65 Fe) MG tablet Take 1 tablet (325 mg) by mouth daily 100 tablet      hydrocortisone (CORTIZONE-10) 1 % ointment Apply topically 2 times daily as needed for itching       lisinopril  (PRINIVIL/ZESTRIL) 5 MG tablet Take 1 tablet (5 mg) by mouth daily 90 tablet 3     MELATONIN PO Take 5 mg by mouth daily       METFORMIN HCL PO Take 750 mg by mouth 2 times daily (with meals)       metoprolol succinate ER (TOPROL-XL) 100 MG 24 hr tablet Take 1 tablet (100 mg) by mouth daily 90 tablet 3     nitroglycerin (NITROSTAT) 0.4 MG sublingual tablet For chest pain place 1 tablet under the tongue every 5 minutes for 3 doses. If symptoms persist 5 minutes after 1st dose call 911. 25 tablet      nystatin (MYCOSTATIN) 735785 UNIT/GM external cream Apply topically 2 times daily       oxyCODONE IR (ROXICODONE) 5 MG tablet Take 1-2 tablets (5-10 mg) by mouth every 4 hours as needed for pain 20 tablet 0     pantoprazole (PROTONIX) 20 MG EC tablet Take 40 mg by mouth daily       PROVIDER DOSED MANAGED WARFARIN, COUMADIN, OUTPATIENT Dose based on INR per Primary MD/Nursing Home MD.       sodium chloride (EQ SALINE NASAL SPRAY) 0.65 % nasal spray Spray 1 spray into both nostrils daily as needed for congestion 1 Bottle 1     traZODone (DESYREL) 100 MG tablet Take 100 mg by mouth At Bedtime       warfarin (COUMADIN) 5 MG tablet 7.5mg MWF, and 5mg TuThSatSun, or as directed by the [facility] at [facility] 120 tablet 1     Warfarin Therapy Reminder 1 each continuous prn       order for DME Equipment being ordered: DH2 shoe (Patient not taking: Reported on 8/12/2019) 1 Device 0     polyethylene glycol (MIRALAX/GLYCOLAX) Packet Take 1 packet by mouth daily as needed          Past Medical History:   Diagnosis Date     Arthritis      ASCVD (arteriosclerotic cardiovascular disease)      BMI 30.0-30.9,adult      BPH (benign prostatic hypertrophy)      Cellulitis      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)      Coronary artery disease     triple bypass 1995     Diabetes mellitus (H)      Diabetic polyneuropathy (H)      History of blood transfusion      Hyperlipidaemia       Hypertension      Non-healing ulcer of foot (H)     Right     Noninflammatory pericardial effusion      Osteomyelitis of left foot (H)      PVD (peripheral vascular disease) (H)      Sebaceous cyst        Past Surgical History:   Procedure Laterality Date     AMPUTATE LEG BELOW KNEE Right 10/8/2018    Procedure: AMPUTATE LEG BELOW KNEE;  OPEN RIGHT LOWER LEG AMPUTATION WITH WOUND VAC PLACEMENT    EBL: 50mL;  Surgeon: Amador Vick MD;  Location:  OR     AMPUTATE REVISION STUMP LOWER EXTREMITY Right 10/11/2018    Procedure: AMPUTATE REVISION STUMP LOWER EXTREMITY;  CLOSURE RIGHT BELOW KNEE AMPUTATION;  Surgeon: Amador Vick MD;  Location:  OR     AMPUTATE TOE(S)  1/10/2014    Procedure: AMPUTATE TOE(S);;  Surgeon: Amador Vick MD;  Location:  OR     APPLY WOUND VAC Right 10/8/2018    Procedure: APPLY WOUND VAC;;  Surgeon: Amador Vick MD;  Location:  OR     BONE MARROW BIOPSY, BONE SPECIMEN, NEEDLE/TROCAR  10/19/2012    Procedure: BIOPSY BONE MARROW;  BONE MARROW BIOPSY  (CONSCIOUS SED);  Surgeon: Ramon Quesada MD;  Location:  GI     BYPASS GRAFT FEMOROPOPLITEAL  1/10/2014    Procedure: BYPASS GRAFT FEMOROPOPLITEAL;  Left above knee popliteal to  Below knee popliteal bypass using transverse saphenous vein graft. Left 2nd Toe amputation;  Surgeon: Amador Vick MD;  Location:  OR     CARDIAC SURGERY      angioplasty with stent, triple bypass     EXCISE CYST GENERIC (LOCATION) N/A 2/1/2016    Procedure: EXCISE CYST GENERIC (LOCATION);  Surgeon: Amador Vick MD;  Location:  SD     GRAFT VEIN FROM EXTREMITY (LOCATION)  1/10/2014    Procedure: GRAFT VEIN FROM EXTREMITY (LOCATION);;  Surgeon: Amador Vick MD;  Location:  OR     LAMINECTOMY THORACIC ONE LEVEL N/A 2/8/2017    Procedure: LAMINECTOMY THORACIC ONE LEVEL;  Surgeon: Marcelo Trent MD;  Location: [facility] OR     OPTICAL TRACKING SYSTEM FUSION POSTERIOR SPINE THORACIC THREE+ LEVELS N/A 2/12/2017  "   Procedure: OPTICAL TRACKING SYSTEM FUSION POSTERIOR SPINE THORACIC THREE+ LEVELS;  Surgeon: Marcelo Trent MD;  Location: [facility] OR     TONSILLECTOMY       VASCULAR SURGERY      ptcr legs   T9-10 discitis with overlying epidural abscess  S/p laminectomy / abscess evacuation.  Critical lower limb ischemia , s/p L AK pop to BK pop bypass with rGSV on 1/10/2014  Family History   Problem Relation Age of Onset     Cancer Mother         leukemia       Social History     Tobacco Use     Smoking status: Former Smoker     Packs/day: 2.00     Years: 30.00     Pack years: 60.00     Types: Cigarettes     Last attempt to quit: 1995     Years since quittin.1     Smokeless tobacco: Never Used   Substance Use Topics     Alcohol use: No       Allergies   Allergen Reactions     Blood Transfusion Related (Informational Only) Other (See Comments)     Patient has a history of a clinically significant antibody against RBC antigens.  A delay in compatible RBCs may occur.        Physical Examination:  /69 (BP Location: Left arm, Patient Position: Chair, Cuff Size: Adult Regular)   Pulse 61   Ht 1.791 m (5' 10.5\")   Wt 77.6 kg (171 lb)   SpO2 97%   BMI 24.19 kg/m   Body mass index is 24.19 kg/m .  Gen: pleasant male in no apparent distress      HEENT: NC/AT, EOMI  Neck: supple, no JVD  Heart: regular, no m/r/g   Lungs: Clear bilaterally, no wheezes or crackles  Abdomen: Soft, nontender, nondistended  Extremities: Right below knee amputation, trace edema LLE, 2+ peripheral pulses  Neuro: alert and oriented, no focal deficits  Skin: warm and dry, no rashes on exposed surfaces 10 AM  Musculoskeletal: R BKA and he has a prosthetic leg    Laboratory:    Chemistry panel:   Recent Labs   Lab Test 19  1420 19  1035  10/03/18  0636  18  0710 18  0042  18  0453    138   < > 135   < > 134 132*   < > 140   POTASSIUM 4.5 4.3   < > 3.2*   < > 4.4 4.5   < > 3.7   CHLORIDE 105 105   < > 103   < " > 101 96   < > 105   CO2 28 26   < > 22   < > 25 24   < > 25   ANIONGAP 5 7   < > 10   < > 8 12   < > 10   * 192*   < > 135*   < > 285* 303*   < > 131*   BUN 23 23   < > 30   < > 82* 95*   < > 26   CR 1.08 1.03   < > 1.28*   < > 1.91* 2.23*   < > 0.93   JUSTINO 9.0 9.0   < > 8.0*   < > 8.8 9.7   < > 8.6   MAG  --   --   --   --   --  1.8  --   --  1.7   GFRESTIMATED 66 70   < > 55*   < > 34* 29*   < > 79   AST  --   --   --  15  --   --  11   < >  --    ALT  --   --   --  14  --   --  27   < >  --     < > = values in this interval not displayed.       CBC:   Recent Labs   Lab Test 01/14/19  1202 11/08/18  10/22/18  10/14/18  0757  10/11/18  1657   WBC  --   --   --  4.2  --  6.5  --  9.1   RBC  --   --   --  2.94*  --  2.73*  --  2.62*   HGB 12.6* 9.9*   < > 8.5*   < > 8.2*   < > 7.8*   HCT  --   --   --  27.0*  --  24.2*  --  23.5*   MCV  --   --   --  91.8  --  89  --  90   MCH  --   --   --   --   --  30.0  --  29.8   MCHC  --   --   --   --   --  33.9  --  33.2   RDW  --   --   --  16.5*  --  15.9*  --  16.1*   PLT  --   --   --  188  --  130*  --  152    < > = values in this interval not displayed.       Lipid Panel:  Recent Labs   Lab Test 04/09/18  0951 05/23/14  0745 01/10/14  0923   CHOL 65 134 92   HDL 30* 34* 28*   LDL 23 48 45   TRIG 58 257* 92   CHOLHDLRATIO  --  3.9 3.3       Thyroid:   TSH   Date Value Ref Range Status   10/03/2018 1.11 0.40 - 4.00 mU/L Final     No results found for: T4  Hemoglobin A1C   Date Value Ref Range Status   09/22/2018 9.0 (H) 0 - 5.6 % Final     Comment:     Normal <5.7% Prediabetes 5.7-6.4%  Diabetes 6.5% or higher - adopted from ADA   consensus guidelines.       INR   Date Value Ref Range Status   08/12/2019 2.22 (H) 0.86 - 1.14 Final         Cardiac data:    ECG today: NSR with first degree AV block, anterolateral TWI        ECG 1/14/19: NSR with first degree AV block, anterolateral TWI        ECG July 2018  Atrial fibrillation with rate of 75, nonspecific T-wave  changes seen laterally (seen previously)      ECG 04/18: atrial fibrillation that is well controlled, nonspecific T-wave abnormalities laterally.    Echo 10/5/18  Left ventricular function  Is moderately reduced, estimated at 35-40%.  There is mild-moderate global hypokinesia of the left ventricle.  Mildly decreased right ventricular systolic function     In comparison with the previous study the LVEF has improved slightly.    RHC 3/2018        LCH 3/2018    Echo 2/18  Severe left ventricular dilation is present. Biplane LV EF 20%. Severe diffuse hypokinesis is present.  Mild to moderate right ventricular dilation is present.  Global right ventricular function is moderately to severely reduced.  Dilation of the inferior vena cava is present with abnormal respiratory  variation in diameter.  No pericardial effusion is present.  A left pleural effusion is present.    ECG February 2018  Atrial fibrillation nonspecific ST-T abnormalities    EKG 04/06/17:    NSR Rate approx 125 bpm, LAD, no ischemic changes.    Echo (2/24/2017)  Technically difficult study.The patient's rhythm is atrial fibrillation.  The left ventricle is severely dilated. Left ventricular systolic function is  severely reduced, ejection fraction is estimated at 25-30%. There is severe  global hypokinesis.  Right ventricular systolic function is mildly reduced.  The right ventricular systolic pressure is approximated at 15 mmHg plus the  right atrial pressure. IVC is plethoric and estimated mean RA pressure is 15  mmHg.  No pericardial effusion present.    Assessment/Plan:  1. HFrEF, Stage C, NYHA 3  2. CAD  s/p CABG L-LAD, SVG-OM1, SVG-D1 in 1995. s/p LHc in 3/2018 and PCI to mLAD. Not on ASA 2/2 already being   3.  Paroxysmal atrial fibrillation  4. Essential Hypertension  5. Dyslipidemia  6. DM Type II  7. PAD with LE stents and bypass   8. R BKA    Mr. Manning returns for follow-up.  He has ischemic cardiomyopathy and chronic systolic heart failure  with an ejection fraction around 35-40%. He underwent a coronary angiogram in March 2018 and was noted to have patent grafts and his native  LAD had significant obstructive disease after the LIMA anastomosis which was intervened with placement of drug-eluting stents.  He was hospitalized overnight for diuresis given the elevated filling pressures.    On exam today he is euvolemic.  His blood pressure is improved and controlled. He is in sinus rhythm today. His lipids need to be updated, previously well controlled on atorvastatin to 20 mg once daily.  He is on GDMT and is on lisinopril, metoprolol, digoxin, atorva and aspirin.  Advise follow-up with me in 9 months with a lipid panel and no changes recommended.Will add dig level to labs today and notify patient.     Clarisse Valdes MD, MS  Staff Cardiologist  Pager: [phone]

## 2019-08-12 NOTE — PROGRESS NOTES
ANTICOAGULATION FOLLOW-UP CLINIC VISIT    Patient Name:  Tad Manning  Date:  8/12/2019  Contact Type:  Telephone    SUBJECTIVE:         OBJECTIVE    INR   Date Value Ref Range Status   08/12/2019 2.22 (H) 0.86 - 1.14 Final       ASSESSMENT / PLAN  INR assessment THER    Recheck INR In: 2 WEEKS    INR Location Clinic      Anticoagulation Summary  As of 8/12/2019    INR goal:   2.0-3.0   TTR:   47.7 % (1.3 y)   INR used for dosing:      Warfarin maintenance plan:   7.5 mg (5 mg x 1.5) every Mon, Fri; 5 mg (5 mg x 1) all other days   Full warfarin instructions:   7.5 mg every Mon, Fri; 5 mg all other days   Weekly warfarin total:   40 mg   Plan last modified:   Vicki Hearn RN (8/8/2019)   Next INR check:   8/26/2019   Priority:   INR   Target end date:   Indefinite    Indications    Atrial fibrillation -- on Warfarin [I48.91]  Long-term (current) use of anticoagulants [Z79.01] [Z79.01]             Anticoagulation Episode Summary     INR check location:       Preferred lab:       Send INR reminders to:   [facility]    Comments:   HIPPA Infor returned. OK to  on cell [phone].  labs @ [facility] faxed there 2/16.  fax: [phone]    Goes by [facility]      Anticoagulation Care Providers     Provider Role Specialty Phone number    Clarisse Valdes MD Responsible Cardiology [phone]            See the Encounter Report to view Anticoagulation Flowsheet and Dosing Calendar (Go to Encounters tab in chart review, and find the Anticoagulation Therapy Visit)    Spoke with patient.    Chelsea Bland RN

## 2019-08-12 NOTE — PROGRESS NOTES
ANTICOAGULATION FOLLOW-UP CLINIC VISIT    Patient Name:  Tad Manning  Date:  8/12/2019  Contact Type:  Telephone    SUBJECTIVE:         OBJECTIVE    INR   Date Value Ref Range Status   08/12/2019 2.22 (H) 0.86 - 1.14 Final       ASSESSMENT / PLAN  INR assessment THER    Recheck INR In: 2 WEEKS    INR Location Clinic      Anticoagulation Summary  As of 8/12/2019    INR goal:   2.0-3.0   TTR:   47.7 % (1.3 y)   INR used for dosing:      Warfarin maintenance plan:   7.5 mg (5 mg x 1.5) every Mon, Fri; 5 mg (5 mg x 1) all other days   Full warfarin instructions:   7.5 mg every Mon, Fri; 5 mg all other days   Weekly warfarin total:   40 mg   Plan last modified:   Vicki Hearn RN (8/8/2019)   Next INR check:   8/26/2019   Priority:   INR   Target end date:   Indefinite    Indications    Atrial fibrillation -- on Warfarin [I48.91]  Long-term (current) use of anticoagulants [Z79.01] [Z79.01]             Anticoagulation Episode Summary     INR check location:       Preferred lab:       Send INR reminders to:   [facility]    Comments:   HIPPA Infor returned. OK to  on cell [phone].  labs @ [facility] faxed there 2/16.  fax: [phone]    Goes by [facility]      Anticoagulation Care Providers     Provider Role Specialty Phone number    Clarisse Valdes MD Responsible Cardiology [phone]            See the Encounter Report to view Anticoagulation Flowsheet and Dosing Calendar (Go to Encounters tab in chart review, and find the Anticoagulation Therapy Visit)  Spoke with patient.    Chelsea Bland RN

## 2019-08-12 NOTE — NURSING NOTE
Chief Complaint   Patient presents with     Follow Up     79 yo male, paroxysmal afib, HTN, HFrF     Vitals were taken and medications were reconciled. EKG was performed.    Cherie Dover CMA    3:10 PM

## 2019-08-12 NOTE — LETTER
8/12/2019      RE: Tad Manning  [address] [address] [address]       Dear Colleague,    Thank you for the opportunity to participate in the care of your patient, Tad Manning, at the [facility] at [facility]. Please see a copy of my visit note below.    Cardiology Clinic Note  08/12/2019    HPI:   Mr. Tad Manning is a 78-year old male with a past medical history of CLL in remission, CAD s/p CABG in 1995, HFrEF, atrial flutter, PVD s/p iliac artery stents and left lower extremity bypass, hypertension, diabetes, hyperlipidemia, discitis and multple admission for decompensated ICM/HFrEF who presents for follow-up of cardiomyopathy.     He underwent a LHC in March 2018 that showed moderate disease in his RCA, severe LAD disease and patent grafts and had two drug eluting stents placed in the proximal and mid LAD. RHC showed elevated right and left sided pressures with type II pulmonary hypertension. He then underwent a R leg below knee amputation in Oct 2018.      Interval History:  Since his last visit, in April 2019, he feels much better. He is getting used to the prosthesis in the left leg. He denies chest pain, new ALEXIS, palpitations, lightheadedness or dizziness. Exercise capacity is limited.    ROS:  A complete 10-pt ROS was negative except as above.    Cardiac meds  - furosemide 20 mg/d  - lisinopril 5 mg/d  - digoxin 125 mcg daily  - warfarin  - metoprolol  mg daily  - atorvastatin 20 mg daily  - Nitro PRN    Current Outpatient Medications   Medication Sig Dispense Refill     Acetaminophen (TYLENOL PO) Take 1,000 mg by mouth every 6 hours       aspirin (ASA) 81 MG tablet Take 1 tablet (81 mg) by mouth daily       atorvastatin (LIPITOR) 20 MG tablet Take 1 tablet (20 mg) by mouth daily 90 tablet 0     cholecalciferol 1000 UNITS TABS Take 1,000 Units by mouth daily 30 tablet      Citalopram Hydrobromide (CELEXA PO) Take 10  mg by mouth daily       digoxin (LANOXIN) 125 MCG tablet Take 1 tablet (125 mcg) by mouth daily 90 tablet 3     ferrous sulfate (IRON) 325 (65 Fe) MG tablet Take 1 tablet (325 mg) by mouth daily 100 tablet      hydrocortisone (CORTIZONE-10) 1 % ointment Apply topically 2 times daily as needed for itching       lisinopril (PRINIVIL/ZESTRIL) 5 MG tablet Take 1 tablet (5 mg) by mouth daily 90 tablet 3     MELATONIN PO Take 5 mg by mouth daily       METFORMIN HCL PO Take 750 mg by mouth 2 times daily (with meals)       metoprolol succinate ER (TOPROL-XL) 100 MG 24 hr tablet Take 1 tablet (100 mg) by mouth daily 90 tablet 3     nitroglycerin (NITROSTAT) 0.4 MG sublingual tablet For chest pain place 1 tablet under the tongue every 5 minutes for 3 doses. If symptoms persist 5 minutes after 1st dose call 911. 25 tablet      nystatin (MYCOSTATIN) 770287 UNIT/GM external cream Apply topically 2 times daily       oxyCODONE IR (ROXICODONE) 5 MG tablet Take 1-2 tablets (5-10 mg) by mouth every 4 hours as needed for pain 20 tablet 0     pantoprazole (PROTONIX) 20 MG EC tablet Take 40 mg by mouth daily       PROVIDER DOSED MANAGED WARFARIN, COUMADIN, OUTPATIENT Dose based on INR per Primary MD/Nursing Home MD.       sodium chloride (EQ SALINE NASAL SPRAY) 0.65 % nasal spray Spray 1 spray into both nostrils daily as needed for congestion 1 Bottle 1     traZODone (DESYREL) 100 MG tablet Take 100 mg by mouth At Bedtime       warfarin (COUMADIN) 5 MG tablet 7.5mg MWF, and 5mg TuThSatSun, or as directed by the [facility] the [facility] 120 tablet 1     Warfarin Therapy Reminder 1 each continuous prn       order for DME Equipment being ordered: DH2 shoe (Patient not taking: Reported on 8/12/2019) 1 Device 0     polyethylene glycol (MIRALAX/GLYCOLAX) Packet Take 1 packet by mouth daily as needed          Past Medical History:   Diagnosis Date     Arthritis      ASCVD (arteriosclerotic cardiovascular disease)      BMI  30.0-30.9,adult      BPH (benign prostatic hypertrophy)      Cellulitis      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)      Coronary artery disease     triple bypass 1995     Diabetes mellitus (H)      Diabetic polyneuropathy (H)      History of blood transfusion      Hyperlipidaemia      Hypertension      Non-healing ulcer of foot (H)     Right     Noninflammatory pericardial effusion      Osteomyelitis of left foot (H)      PVD (peripheral vascular disease) (H)      Sebaceous cyst        Past Surgical History:   Procedure Laterality Date     AMPUTATE LEG BELOW KNEE Right 10/8/2018    Procedure: AMPUTATE LEG BELOW KNEE;  OPEN RIGHT LOWER LEG AMPUTATION WITH WOUND VAC PLACEMENT    EBL: 50mL;  Surgeon: Amador Vick MD;  Location:  OR     AMPUTATE REVISION STUMP LOWER EXTREMITY Right 10/11/2018    Procedure: AMPUTATE REVISION STUMP LOWER EXTREMITY;  CLOSURE RIGHT BELOW KNEE AMPUTATION;  Surgeon: Amador Vick MD;  Location:  OR     AMPUTATE TOE(S)  1/10/2014    Procedure: AMPUTATE TOE(S);;  Surgeon: Amador Vick MD;  Location:  OR     APPLY WOUND VAC Right 10/8/2018    Procedure: APPLY WOUND VAC;;  Surgeon: Amador Vick MD;  Location:  OR     BONE MARROW BIOPSY, BONE SPECIMEN, NEEDLE/TROCAR  10/19/2012    Procedure: BIOPSY BONE MARROW;  BONE MARROW BIOPSY  (CONSCIOUS SED);  Surgeon: Ramon Quesada MD;  Location:  GI     BYPASS GRAFT FEMOROPOPLITEAL  1/10/2014    Procedure: BYPASS GRAFT FEMOROPOPLITEAL;  Left above knee popliteal to  Below knee popliteal bypass using transverse saphenous vein graft. Left 2nd Toe amputation;  Surgeon: Amador Vick MD;  Location:  OR     CARDIAC SURGERY      angioplasty with stent, triple bypass     EXCISE CYST GENERIC (LOCATION) N/A 2/1/2016    Procedure: EXCISE CYST GENERIC (LOCATION);  Surgeon: Amador Vick MD;  Location:  SD     GRAFT VEIN FROM EXTREMITY (LOCATION)  1/10/2014    Procedure:  "GRAFT VEIN FROM EXTREMITY (LOCATION);;  Surgeon: Amador Vick MD;  Location: [facility] OR     LAMINECTOMY THORACIC ONE LEVEL N/A 2017    Procedure: LAMINECTOMY THORACIC ONE LEVEL;  Surgeon: Marcelo Trent MD;  Location: [facility] OR     OPTICAL TRACKING SYSTEM FUSION POSTERIOR SPINE THORACIC THREE+ LEVELS N/A 2017    Procedure: OPTICAL TRACKING SYSTEM FUSION POSTERIOR SPINE THORACIC THREE+ LEVELS;  Surgeon: Marcelo Trent MD;  Location: [facility] OR     TONSILLECTOMY       VASCULAR SURGERY      ptcr legs   T9-10 discitis with overlying epidural abscess  S/p laminectomy / abscess evacuation.  Critical lower limb ischemia , s/p L AK pop to BK pop bypass with rGSV on 1/10/2014  Family History   Problem Relation Age of Onset     Cancer Mother         leukemia       Social History     Tobacco Use     Smoking status: Former Smoker     Packs/day: 2.00     Years: 30.00     Pack years: 60.00     Types: Cigarettes     Last attempt to quit: 1995     Years since quittin.1     Smokeless tobacco: Never Used   Substance Use Topics     Alcohol use: No       Allergies   Allergen Reactions     Blood Transfusion Related (Informational Only) Other (See Comments)     Patient has a history of a clinically significant antibody against RBC antigens.  A delay in compatible RBCs may occur.        Physical Examination:  /69 (BP Location: Left arm, Patient Position: Chair, Cuff Size: Adult Regular)   Pulse 61   Ht 1.791 m (5' 10.5\")   Wt 77.6 kg (171 lb)   SpO2 97%   BMI 24.19 kg/m    Body mass index is 24.19 kg/m .  Gen: pleasant male in no apparent distress      HEENT: NC/AT, EOMI  Neck: supple, no JVD  Heart: regular, no m/r/g   Lungs: Clear bilaterally, no wheezes or crackles  Abdomen: Soft, nontender, nondistended  Extremities: Right below knee amputation, trace edema LLE, 2+ peripheral pulses  Neuro: alert and oriented, no focal deficits  Skin: warm and dry, no rashes on exposed surfaces 10 " AM  Musculoskeletal: R BKA and he has a prosthetic leg    Laboratory:    Chemistry panel:   Recent Labs   Lab Test 08/12/19  1420 01/04/19  1035  10/03/18  0636  09/22/18  0710 09/22/18  0042  03/07/18  0453    138   < > 135   < > 134 132*   < > 140   POTASSIUM 4.5 4.3   < > 3.2*   < > 4.4 4.5   < > 3.7   CHLORIDE 105 105   < > 103   < > 101 96   < > 105   CO2 28 26   < > 22   < > 25 24   < > 25   ANIONGAP 5 7   < > 10   < > 8 12   < > 10   * 192*   < > 135*   < > 285* 303*   < > 131*   BUN 23 23   < > 30   < > 82* 95*   < > 26   CR 1.08 1.03   < > 1.28*   < > 1.91* 2.23*   < > 0.93   JUSTINO 9.0 9.0   < > 8.0*   < > 8.8 9.7   < > 8.6   MAG  --   --   --   --   --  1.8  --   --  1.7   GFRESTIMATED 66 70   < > 55*   < > 34* 29*   < > 79   AST  --   --   --  15  --   --  11   < >  --    ALT  --   --   --  14  --   --  27   < >  --     < > = values in this interval not displayed.       CBC:   Recent Labs   Lab Test 01/14/19  1202 11/08/18  10/22/18  10/14/18  0757  10/11/18  1657   WBC  --   --   --  4.2  --  6.5  --  9.1   RBC  --   --   --  2.94*  --  2.73*  --  2.62*   HGB 12.6* 9.9*   < > 8.5*   < > 8.2*   < > 7.8*   HCT  --   --   --  27.0*  --  24.2*  --  23.5*   MCV  --   --   --  91.8  --  89  --  90   MCH  --   --   --   --   --  30.0  --  29.8   MCHC  --   --   --   --   --  33.9  --  33.2   RDW  --   --   --  16.5*  --  15.9*  --  16.1*   PLT  --   --   --  188  --  130*  --  152    < > = values in this interval not displayed.       Lipid Panel:  Recent Labs   Lab Test 04/09/18  0951 05/23/14  0745 01/10/14  0923   CHOL 65 134 92   HDL 30* 34* 28*   LDL 23 48 45   TRIG 58 257* 92   CHOLHDLRATIO  --  3.9 3.3       Thyroid:   TSH   Date Value Ref Range Status   10/03/2018 1.11 0.40 - 4.00 mU/L Final     No results found for: T4  Hemoglobin A1C   Date Value Ref Range Status   09/22/2018 9.0 (H) 0 - 5.6 % Final     Comment:     Normal <5.7% Prediabetes 5.7-6.4%  Diabetes 6.5% or higher - adopted from  ADA   consensus guidelines.       INR   Date Value Ref Range Status   08/12/2019 2.22 (H) 0.86 - 1.14 Final         Cardiac data:    ECG today: NSR with first degree AV block, anterolateral TWI        ECG 1/14/19: NSR with first degree AV block, anterolateral TWI        ECG July 2018  Atrial fibrillation with rate of 75, nonspecific T-wave changes seen laterally (seen previously)      ECG 04/18: atrial fibrillation that is well controlled, nonspecific T-wave abnormalities laterally.    Echo 10/5/18  Left ventricular function  Is moderately reduced, estimated at 35-40%.  There is mild-moderate global hypokinesia of the left ventricle.  Mildly decreased right ventricular systolic function     In comparison with the previous study the LVEF has improved slightly.    RHC 3/2018        LCH 3/2018    Echo 2/18  Severe left ventricular dilation is present. Biplane LV EF 20%. Severe diffuse hypokinesis is present.  Mild to moderate right ventricular dilation is present.  Global right ventricular function is moderately to severely reduced.  Dilation of the inferior vena cava is present with abnormal respiratory  variation in diameter.  No pericardial effusion is present.  A left pleural effusion is present.    ECG February 2018  Atrial fibrillation nonspecific ST-T abnormalities    EKG 04/06/17:    NSR Rate approx 125 bpm, LAD, no ischemic changes.    Echo (2/24/2017)  Technically difficult study.The patient's rhythm is atrial fibrillation.  The left ventricle is severely dilated. Left ventricular systolic function is  severely reduced, ejection fraction is estimated at 25-30%. There is severe  global hypokinesis.  Right ventricular systolic function is mildly reduced.  The right ventricular systolic pressure is approximated at 15 mmHg plus the  right atrial pressure. IVC is plethoric and estimated mean RA pressure is 15  mmHg.  No pericardial effusion present.    Assessment/Plan:  1. HFrEF, Stage C, NYHA 3  2. CAD  s/p CABG  L-LAD, SVG-OM1, SVG-D1 in 1995. s/p LHc in 3/2018 and PCI to mLAD. Not on ASA 2/2 already being   3.  Paroxysmal atrial fibrillation  4. Essential Hypertension  5. Dyslipidemia  6. DM Type II  7. PAD with LE stents and bypass   8. R BKA    Mr. Manning returns for follow-up.  He has ischemic cardiomyopathy and chronic systolic heart failure with an ejection fraction around 35-40%. He underwent a coronary angiogram in March 2018 and was noted to have patent grafts and his native  LAD had significant obstructive disease after the LIMA anastomosis which was intervened with placement of drug-eluting stents.  He was hospitalized overnight for diuresis given the elevated filling pressures.    On exam today he is euvolemic.  His blood pressure is improved and controlled. He is in sinus rhythm today. His lipids need to be updated, previously well controlled on atorvastatin to 20 mg once daily.  He is on GDMT and is on lisinopril, metoprolol, digoxin, atorva and aspirin.  Advise follow-up with me in  9 months with a lipid panel and no changes recommended.Will add dig level to labs today and notify patient.     Clarisse Valdes MD, MS  Staff Cardiologist  Pager: [phone]

## 2019-08-13 LAB — INTERPRETATION ECG - MUSE: NORMAL

## 2019-08-14 ENCOUNTER — TELEPHONE (OUTPATIENT)
Dept: CARDIOLOGY | Facility: CLINIC | Age: 79
End: 2019-08-14

## 2019-08-14 DIAGNOSIS — I50.22 CHRONIC SYSTOLIC CONGESTIVE HEART FAILURE (H): Primary | ICD-10-CM

## 2019-08-14 NOTE — TELEPHONE ENCOUNTER
Called pt to request lipids and digoxin level at his next INR. Pt will have labs drawn at [facility] on 8/22. Orders placed. Kizzy Oakes RN CORE Care Coordinator

## 2019-08-21 DIAGNOSIS — Z09 SURGICAL FOLLOW-UP CARE: Primary | ICD-10-CM

## 2019-08-21 DIAGNOSIS — Z95.828 HISTORY OF ARTERIAL BYPASS OF LOWER EXTREMITY: ICD-10-CM

## 2019-08-21 DIAGNOSIS — I73.9 PAD (PERIPHERAL ARTERY DISEASE) (H): ICD-10-CM

## 2019-08-26 ENCOUNTER — ANTICOAGULATION THERAPY VISIT (OUTPATIENT)
Dept: ANTICOAGULATION | Facility: CLINIC | Age: 79
End: 2019-08-26

## 2019-08-26 DIAGNOSIS — Z79.01 LONG TERM CURRENT USE OF ANTICOAGULANT THERAPY: ICD-10-CM

## 2019-08-26 DIAGNOSIS — I48.0 PAROXYSMAL ATRIAL FIBRILLATION (H): ICD-10-CM

## 2019-08-26 DIAGNOSIS — I50.22 CHRONIC SYSTOLIC CONGESTIVE HEART FAILURE (H): ICD-10-CM

## 2019-08-26 DIAGNOSIS — I48.20 CHRONIC ATRIAL FIBRILLATION (H): ICD-10-CM

## 2019-08-26 LAB
CHOLEST SERPL-MCNC: 170 MG/DL
DIGOXIN SERPL-MCNC: 1.3 UG/L (ref 0.5–2)
HDLC SERPL-MCNC: 46 MG/DL
INR PPP: 2.23 (ref 0.86–1.14)
LDLC SERPL CALC-MCNC: 85 MG/DL
NONHDLC SERPL-MCNC: 124 MG/DL
TRIGL SERPL-MCNC: 196 MG/DL

## 2019-08-26 PROCEDURE — 80061 LIPID PANEL: CPT | Performed by: INTERNAL MEDICINE

## 2019-08-26 PROCEDURE — 80162 ASSAY OF DIGOXIN TOTAL: CPT | Performed by: INTERNAL MEDICINE

## 2019-08-26 PROCEDURE — 36415 COLL VENOUS BLD VENIPUNCTURE: CPT | Performed by: INTERNAL MEDICINE

## 2019-08-26 PROCEDURE — 85610 PROTHROMBIN TIME: CPT | Performed by: INTERNAL MEDICINE

## 2019-08-26 NOTE — PROGRESS NOTES
ANTICOAGULATION FOLLOW-UP CLINIC VISIT    Patient Name:  Tad Manning  Date:  2019  Contact Type:  Telephone    SUBJECTIVE:         OBJECTIVE    INR   Date Value Ref Range Status   2019 2.23 (H) 0.86 - 1.14 Final       ASSESSMENT / PLAN  INR assessment THER    Recheck INR In: 3 WEEKS    INR Location Clinic      Anticoagulation Summary  As of 2019    INR goal:   2.0-3.0   TTR:   49.1 % (1.4 y)   INR used for dosin.23 (2019)   Warfarin maintenance plan:   7.5 mg (5 mg x 1.5) every Mon, Fri; 5 mg (5 mg x 1) all other days   Full warfarin instructions:   7.5 mg every Mon, Fri; 5 mg all other days   Weekly warfarin total:   40 mg   No change documented:   Zeinab Vargas RN   Plan last modified:   Vicki Hearn RN (2019)   Next INR check:   2019   Priority:   INR   Target end date:   Indefinite    Indications    Atrial fibrillation -- on Warfarin [I48.91]  Long-term (current) use of anticoagulants [Z79.01] [Z79.01]             Anticoagulation Episode Summary     INR check location:       Preferred lab:       Send INR reminders to:   [facility]    Comments:   HIPPA Infor returned. OK to  on cell [phone].  labs @ [facility] [place] faxed there .  fax: [phone]    Goes by Hima      Anticoagulation Care Providers     Provider Role Specialty Phone number    Clarisse Valdes MD [facility] Cardiology [phone]            See the Encounter Report to view Anticoagulation Flowsheet and Dosing Calendar (Go to Encounters tab in chart review, and find the Anticoagulation Therapy Visit)    Spoke with patient. Gave them their lab results and new warfarin recommendation.  No changes in health, medication, or diet. No missed doses, no falls. No signs or symptoms of bleed or clotting.      Zeinab Vargas RN

## 2019-09-10 ENCOUNTER — HOSPITAL ENCOUNTER (OUTPATIENT)
Dept: PHYSICAL THERAPY | Facility: CLINIC | Age: 79
Setting detail: THERAPIES SERIES
End: 2019-09-10
Attending: SURGERY
Payer: MEDICARE

## 2019-09-10 PROCEDURE — 97116 GAIT TRAINING THERAPY: CPT | Mod: GP | Performed by: PHYSICAL THERAPIST

## 2019-09-10 PROCEDURE — 97110 THERAPEUTIC EXERCISES: CPT | Mod: GP,KX | Performed by: PHYSICAL THERAPIST

## 2019-09-16 ENCOUNTER — ANTICOAGULATION THERAPY VISIT (OUTPATIENT)
Dept: ANTICOAGULATION | Facility: CLINIC | Age: 79
End: 2019-09-16

## 2019-09-16 DIAGNOSIS — I10 ESSENTIAL HYPERTENSION: ICD-10-CM

## 2019-09-16 DIAGNOSIS — I48.20 CHRONIC ATRIAL FIBRILLATION (H): ICD-10-CM

## 2019-09-16 DIAGNOSIS — I48.0 PAROXYSMAL ATRIAL FIBRILLATION (H): ICD-10-CM

## 2019-09-16 DIAGNOSIS — I50.23 ACUTE ON CHRONIC SYSTOLIC CONGESTIVE HEART FAILURE (H): ICD-10-CM

## 2019-09-16 DIAGNOSIS — Z79.01 LONG TERM CURRENT USE OF ANTICOAGULANT THERAPY: ICD-10-CM

## 2019-09-16 LAB
DIGOXIN SERPL-MCNC: 1.5 UG/L (ref 0.5–2)
INR PPP: 1.93 (ref 0.86–1.14)

## 2019-09-16 PROCEDURE — 85610 PROTHROMBIN TIME: CPT | Performed by: INTERNAL MEDICINE

## 2019-09-16 PROCEDURE — 80162 ASSAY OF DIGOXIN TOTAL: CPT | Performed by: INTERNAL MEDICINE

## 2019-09-16 PROCEDURE — 36415 COLL VENOUS BLD VENIPUNCTURE: CPT | Performed by: INTERNAL MEDICINE

## 2019-09-16 NOTE — PROGRESS NOTES
ANTICOAGULATION FOLLOW-UP CLINIC VISIT    Patient Name:  Tad Manning  Date:  2019  Contact Type:  Telephone    SUBJECTIVE:  Patient Findings     Comments:   Protocol is followed.        Clinical Outcomes     Comments:   Protocol is followed.           OBJECTIVE    INR   Date Value Ref Range Status   2019 1.93 (H) 0.86 - 1.14 Final       ASSESSMENT / PLAN  No question data found.  Anticoagulation Summary  As of 2019    INR goal:   2.0-3.0   TTR:   50.2 % (1.4 y)   INR used for dosin.93! (2019)   Warfarin maintenance plan:   7.5 mg (5 mg x 1.5) every Mon, Fri; 5 mg (5 mg x 1) all other days   Full warfarin instructions:   7.5 mg every Mon, Fri; 5 mg all other days   Weekly warfarin total:   40 mg   Plan last modified:   Vicki Hearn RN (2019)   Next INR check:   2019   Priority:   INR   Target end date:   Indefinite    Indications    Atrial fibrillation -- on Warfarin [I48.91]  Long-term (current) use of anticoagulants [Z79.01] [Z79.01]             Anticoagulation Episode Summary     INR check location:       Preferred lab:       Send INR reminders to:   [facility]    Comments:   HIPPA Infor returned. OK to  on cell [phone].  labs @ [facility] [place] faxed there .  fax: [phone]    Goes by Hima      Anticoagulation Care Providers     Provider Role Specialty Phone number    Clarisse Valdes MD [facility] Cardiology [phone]            See the Encounter Report to view Anticoagulation Flowsheet and Dosing Calendar (Go to Encounters tab in chart review, and find the Anticoagulation Therapy Visit)    Spoke with patient.     Vicki Hearn RN

## 2019-09-24 ENCOUNTER — HOSPITAL ENCOUNTER (OUTPATIENT)
Dept: PHYSICAL THERAPY | Facility: CLINIC | Age: 79
Setting detail: THERAPIES SERIES
End: 2019-09-24
Attending: SURGERY
Payer: MEDICARE

## 2019-09-24 PROCEDURE — 97750 PHYSICAL PERFORMANCE TEST: CPT | Mod: GP | Performed by: PHYSICAL THERAPIST

## 2019-09-24 PROCEDURE — 97112 NEUROMUSCULAR REEDUCATION: CPT | Mod: GP | Performed by: PHYSICAL THERAPIST

## 2019-09-24 NOTE — PROGRESS NOTES
Outpatient Physical Therapy Progress Note     Patient: Tad Manning  : 1940    Beginning/End Dates of Reporting Period:  19 to 9/10/2019 (10th visit note)     Referring Provider: Dr. Amador Vick    Therapy Diagnosis: Difficulty walking     Client Self Report: Reports not using AD a lot in the house. Uses WW or cane with community ambulation. Walks 1-2xday. Reports only doing exs a little intermittantly.     Objective Measurements:  Objective Measure: 6MWT  Details: 625 ft with cane    DGI 14/24 (completed on 19)       Goals:  Goal Identifier Household amb   Goal Description Pt demo ability to tolerate his prothesis and ambulate household distances with 4WW mod I.   Target Date 19   Date Met      Progress:  Goal met     Goal Identifier community amb   Goal Description Pt demo improved endurance via ability to amb at least 500' with 4WW in 6MWT for improved community accessibility.   Target Date 19   Date Met      Progress:  Goal met     Goal Identifier Stairs   Goal Description Pt demo ability to climb at least 6 stairs with 1 rail and AAD for community accessibility.   Target Date 19   Date Met      Progress:Goal met     Goal Identifier Haircut   Goal Description Pt report ability to walk with his 4WW ~3.5 blocks to go get his haircut.   Target Date 19. Goal date extended to 19   Date Met      Progress:  Client has not attempted this yet and      Goal Identifier Limb cares   Goal Description Pt demo understanding of limb care including skin checks and appropriate use of socks for socket fit.   Target Date 19   Date Met      Progress:  Goal met.      Goal Identifier  DGI   Goal Description  To decrease risk of falling, client will improve balance score on DGI to at least a 18/24.     Target Date  19   Date Met      Progress:       Progress Toward Goals:   Progress this reporting period: Client had a significant set back with illness and not being  able to sleep where he was not able to make any progress for a bit. He now appears to be doing much better and appears to tolerate therapy better. He still demonstrates significant weakness and difficulties with balance which puts him at increased risk for falls. Feel he would benefit from further therapy to address these deficits.     Plan:  Other: Seeing client 1x/every 2 weeks to try and space out therapy sessions    Discharge:  No

## 2019-10-08 ENCOUNTER — HOSPITAL ENCOUNTER (OUTPATIENT)
Dept: PHYSICAL THERAPY | Facility: CLINIC | Age: 79
Setting detail: THERAPIES SERIES
End: 2019-10-08
Attending: SURGERY
Payer: MEDICARE

## 2019-10-08 PROCEDURE — 97110 THERAPEUTIC EXERCISES: CPT | Mod: GP,KX | Performed by: PHYSICAL THERAPIST

## 2019-10-08 PROCEDURE — 97112 NEUROMUSCULAR REEDUCATION: CPT | Mod: GP,KX | Performed by: PHYSICAL THERAPIST

## 2019-10-15 ENCOUNTER — ANTICOAGULATION THERAPY VISIT (OUTPATIENT)
Dept: ANTICOAGULATION | Facility: CLINIC | Age: 79
End: 2019-10-15

## 2019-10-15 DIAGNOSIS — Z79.01 LONG TERM CURRENT USE OF ANTICOAGULANT THERAPY: ICD-10-CM

## 2019-10-15 DIAGNOSIS — I48.0 PAROXYSMAL ATRIAL FIBRILLATION (H): ICD-10-CM

## 2019-10-15 DIAGNOSIS — I48.20 CHRONIC ATRIAL FIBRILLATION (H): ICD-10-CM

## 2019-10-15 LAB — INR BLD: 2.7 (ref 0.86–1.14)

## 2019-10-15 PROCEDURE — 36416 COLLJ CAPILLARY BLOOD SPEC: CPT | Performed by: INTERNAL MEDICINE

## 2019-10-15 PROCEDURE — 85610 PROTHROMBIN TIME: CPT | Mod: QW | Performed by: INTERNAL MEDICINE

## 2019-10-15 NOTE — PROGRESS NOTES
ANTICOAGULATION FOLLOW-UP CLINIC VISIT    Patient Name:  Tad Manning  Date:  10/15/2019  Contact Type:  Telephone    SUBJECTIVE:  Patient Findings     Comments:   Hima reports no changes in health, diet, medications.        Clinical Outcomes     Comments:   Hima reports no changes in health, diet, medications.           OBJECTIVE    INR Point of Care   Date Value Ref Range Status   10/15/2019 2.7 (H) 0.86 - 1.14 Final     Comment:     This test is intended for monitoring Coumadin therapy.  Results are not   accurate in patients with prolonged INR due to factor deficiency.         ASSESSMENT / PLAN  INR assessment THER    Recheck INR In: 3 WEEKS    INR Location Clinic      Anticoagulation Summary  As of 10/15/2019    INR goal:   2.0-3.0   TTR:   52.4 % (1.5 y)   INR used for dosin.7 (10/15/2019)   Warfarin maintenance plan:   7.5 mg (5 mg x 1.5) every Mon, Fri; 5 mg (5 mg x 1) all other days   Full warfarin instructions:   7.5 mg every Mon, Fri; 5 mg all other days   Weekly warfarin total:   40 mg   No change documented:   Yoselin Hunter RN   Plan last modified:   Vicki Hearn RN (2019)   Next INR check:   2019   Priority:   INR   Target end date:   Indefinite    Indications    Atrial fibrillation -- on Warfarin [I48.91]  Long-term (current) use of anticoagulants [Z79.01] [Z79.01]             Anticoagulation Episode Summary     INR check location:       Preferred lab:       Send INR reminders to:   [facility]    Comments:   HIPPA Infor returned. OK to  on cell [phone].  labs @ [facility] [place] faxed there .  fax: [phone]    Goes by Hima      Anticoagulation Care Providers     Provider Role Specialty Phone number    Clarisse Valdes MD Responsible Cardiology [phone]            See the Encounter Report to view Anticoagulation Flowsheet and Dosing Calendar (Go to Encounters tab in chart review, and find the Anticoagulation Therapy Visit)    Spoke with  Hima.  He reports no changes in health, diet, medications.    If INR is therapeutic at next check, Hima would like to go 4 weeks between.    Yoselin Hunter RN

## 2019-10-22 ENCOUNTER — HOSPITAL ENCOUNTER (OUTPATIENT)
Dept: PHYSICAL THERAPY | Facility: CLINIC | Age: 79
Setting detail: THERAPIES SERIES
End: 2019-10-22
Attending: SURGERY
Payer: MEDICARE

## 2019-10-22 PROCEDURE — 97112 NEUROMUSCULAR REEDUCATION: CPT | Mod: GP | Performed by: PHYSICAL THERAPIST

## 2019-10-22 PROCEDURE — 97110 THERAPEUTIC EXERCISES: CPT | Mod: GP | Performed by: PHYSICAL THERAPIST

## 2019-11-04 ENCOUNTER — ANTICOAGULATION THERAPY VISIT (OUTPATIENT)
Dept: ANTICOAGULATION | Facility: CLINIC | Age: 79
End: 2019-11-04

## 2019-11-04 DIAGNOSIS — I48.20 CHRONIC ATRIAL FIBRILLATION (H): ICD-10-CM

## 2019-11-04 DIAGNOSIS — Z79.01 LONG TERM CURRENT USE OF ANTICOAGULANT THERAPY: ICD-10-CM

## 2019-11-04 DIAGNOSIS — I48.0 PAROXYSMAL ATRIAL FIBRILLATION (H): ICD-10-CM

## 2019-11-04 LAB
CAPILLARY BLOOD COLLECTION: NORMAL
INR BLD: 2.3 (ref 0.86–1.14)

## 2019-11-04 PROCEDURE — 36416 COLLJ CAPILLARY BLOOD SPEC: CPT | Performed by: INTERNAL MEDICINE

## 2019-11-04 PROCEDURE — 85610 PROTHROMBIN TIME: CPT | Mod: QW | Performed by: INTERNAL MEDICINE

## 2019-11-04 NOTE — PROGRESS NOTES
ANTICOAGULATION FOLLOW-UP CLINIC VISIT    Patient Name:  Tad Manning  Date:  2019  Contact Type:  Telephone    SUBJECTIVE:  Patient Findings     Comments:   No Problems found. No Changes in Health, Medications, or diet. No Signs of bruising, bleeding or clotting.        Clinical Outcomes     Comments:   No Problems found. No Changes in Health, Medications, or diet. No Signs of bruising, bleeding or clotting.           OBJECTIVE    INR Point of Care   Date Value Ref Range Status   2019 2.3 (H) 0.86 - 1.14 Final     Comment:     This test is intended for monitoring Coumadin therapy.  Results are not   accurate in patients with prolonged INR due to factor deficiency.         ASSESSMENT / PLAN  INR assessment THER    Recheck INR In: 3 WEEKS    INR Location Clinic      Anticoagulation Summary  As of 2019    INR goal:   2.0-3.0   TTR:   54.0 % (1.6 y)   INR used for dosin.3 (2019)   Warfarin maintenance plan:   7.5 mg (5 mg x 1.5) every Mon, Fri; 5 mg (5 mg x 1) all other days   Full warfarin instructions:   7.5 mg every Mon, Fri; 5 mg all other days   Weekly warfarin total:   40 mg   No change documented:   Darien Ballard RN   Plan last modified:   Vicki Hearn RN (2019)   Next INR check:   2019   Priority:   INR   Target end date:   Indefinite    Indications    Atrial fibrillation -- on Warfarin [I48.91]  Long-term (current) use of anticoagulants [Z79.01] [Z79.01]             Anticoagulation Episode Summary     INR check location:       Preferred lab:       Send INR reminders to:   [facility]    Comments:   HIPPA Infor returned. OK to LM on cell [phone].  labs @ [facility] [place] faxed there .  fax: [phone]    Goes by Hima      Anticoagulation Care Providers     Provider Role Specialty Phone number    Clarisse Valdes MD [facility] Cardiology [phone]            See the Encounter Report to view Anticoagulation Flowsheet and Dosing  Calendar (Go to Encounters tab in chart review, and find the Anticoagulation Therapy Visit)    INR/CFX/F2 RESULT: INR result is 2.3    ASSESSMENT:Left message for patient with results and dosing recommendations. Asked patient to call back to report any missed doses, falls, signs and symptoms of bleeding or clotting, any changes in health, medication, or diet. Asked patient to call back with any questions or concerns.    DOSING ADJUSTMENT:continue current maintenance dose    NEXT INR/FACTOR X OR FACTOR II: 11/25    PROTOCOL FOLLOWED: goal 2-3    Darien Ballard RN

## 2019-11-05 ENCOUNTER — HOSPITAL ENCOUNTER (OUTPATIENT)
Dept: PHYSICAL THERAPY | Facility: CLINIC | Age: 79
Setting detail: THERAPIES SERIES
End: 2019-11-05
Attending: SURGERY
Payer: MEDICARE

## 2019-11-05 PROCEDURE — 97110 THERAPEUTIC EXERCISES: CPT | Mod: GP | Performed by: PHYSICAL THERAPIST

## 2019-11-06 ENCOUNTER — HEALTH MAINTENANCE LETTER (OUTPATIENT)
Age: 79
End: 2019-11-06

## 2019-11-21 ENCOUNTER — TELEPHONE (OUTPATIENT)
Dept: OTHER | Facility: CLINIC | Age: 79
End: 2019-11-21

## 2019-11-21 NOTE — TELEPHONE ENCOUNTER
[facility]    Who is the name of the provider?: Nicanor      What is the location you see this provider at?: [place]    Reason for call:  Needs referral signed and dated and faxed back to:    Fax:  [phone]  Patient cannot get prosthetic until this is done.    Can we leave a detailed message on this number?  YES

## 2019-11-22 NOTE — TELEPHONE ENCOUNTER
Referral order signed and faxed to [phone].  Contacted [facility] and LVM updating them of this.  Clinic number provided for any further questions.  ZACK EliseN, RN  [facility] [place]

## 2019-12-11 ENCOUNTER — ANTICOAGULATION THERAPY VISIT (OUTPATIENT)
Dept: ANTICOAGULATION | Facility: CLINIC | Age: 79
End: 2019-12-11

## 2019-12-11 DIAGNOSIS — I50.22 CHRONIC SYSTOLIC HEART FAILURE (H): ICD-10-CM

## 2019-12-11 DIAGNOSIS — Z79.01 LONG TERM CURRENT USE OF ANTICOAGULANT THERAPY: ICD-10-CM

## 2019-12-11 DIAGNOSIS — I48.0 PAROXYSMAL ATRIAL FIBRILLATION (H): ICD-10-CM

## 2019-12-12 RX ORDER — FUROSEMIDE 20 MG
40 TABLET ORAL DAILY
Qty: 180 TABLET | Refills: 3 | OUTPATIENT
Start: 2019-12-12

## 2019-12-17 ENCOUNTER — ANTICOAGULATION THERAPY VISIT (OUTPATIENT)
Dept: ANTICOAGULATION | Facility: CLINIC | Age: 79
End: 2019-12-17

## 2019-12-17 DIAGNOSIS — Z79.01 LONG TERM CURRENT USE OF ANTICOAGULANT THERAPY: ICD-10-CM

## 2019-12-17 DIAGNOSIS — I48.20 CHRONIC ATRIAL FIBRILLATION (H): ICD-10-CM

## 2019-12-17 DIAGNOSIS — I48.0 PAROXYSMAL ATRIAL FIBRILLATION (H): ICD-10-CM

## 2019-12-17 LAB
CAPILLARY BLOOD COLLECTION: NORMAL
INR BLD: 3.5 (ref 0.86–1.14)

## 2019-12-17 PROCEDURE — 85610 PROTHROMBIN TIME: CPT | Mod: QW | Performed by: INTERNAL MEDICINE

## 2019-12-17 PROCEDURE — 36416 COLLJ CAPILLARY BLOOD SPEC: CPT | Performed by: INTERNAL MEDICINE

## 2019-12-17 NOTE — PROGRESS NOTES
ANTICOAGULATION FOLLOW-UP CLINIC VISIT    Patient Name:  Tad Manning  Date:  12/17/2019  Contact Type:  Telephone    SUBJECTIVE:  Patient Findings     Comments:   Pt denies any changes         Clinical Outcomes     Negatives:   Major bleeding event, Thromboembolic event, Anticoagulation-related hospital admission, Anticoagulation-related ED visit, Anticoagulation-related fatality    Comments:   Pt denies any changes            OBJECTIVE    INR Point of Care   Date Value Ref Range Status   12/17/2019 3.5 (H) 0.86 - 1.14 Final     Comment:     This test is intended for monitoring Coumadin therapy.  Results are not   accurate in patients with prolonged INR due to factor deficiency.         ASSESSMENT / PLAN  INR assessment SUPRA    Recheck INR In: 2 WEEKS    INR Location Clinic      Anticoagulation Summary  As of 12/17/2019    INR goal:   2.0-3.0   TTR:   61.8 % (1 y)   INR used for dosing:   3.5! (12/17/2019)   Warfarin maintenance plan:   7.5 mg (5 mg x 1.5) every Mon, Fri; 5 mg (5 mg x 1) all other days   Full warfarin instructions:   12/17: 2.5 mg; Otherwise 7.5 mg every Mon, Fri; 5 mg all other days   Weekly warfarin total:   40 mg   Plan last modified:   Vicki Hearn RN (8/8/2019)   Next INR check:   12/31/2019   Priority:   High   Target end date:   Indefinite    Indications    Atrial fibrillation -- on Warfarin [I48.91]  Long-term (current) use of anticoagulants [Z79.01] [Z79.01]             Anticoagulation Episode Summary     INR check location:       Preferred lab:       Send INR reminders to:   [facility]    Comments:   HIPPA Infor returned. OK to  on cell [phone].  labs @ [facility] faxed there 2/16.  fax: [phone]    Goes by Hima      Anticoagulation Care Providers     Provider Role Specialty Phone number    Clarisse Valdes MD Responsible Cardiology [phone]            See the Encounter Report to view Anticoagulation Flowsheet and Dosing Calendar (Go to  Encounters tab in chart review, and find the Anticoagulation Therapy Visit)    Spoke with patient. Gave them their lab results and new warfarin recommendation.  No changes in health, medication, or diet. No missed doses, no falls. No signs or symptoms of bleed or clotting.     Ellen Santizo RN

## 2019-12-18 ENCOUNTER — HOSPITAL ENCOUNTER (OUTPATIENT)
Dept: ULTRASOUND IMAGING | Facility: CLINIC | Age: 79
Discharge: HOME | End: 2019-12-18
Attending: SURGERY | Admitting: SURGERY
Payer: MEDICARE

## 2019-12-18 ENCOUNTER — OFFICE VISIT (OUTPATIENT)
Dept: OTHER | Facility: CLINIC | Age: 79
End: 2019-12-18
Attending: SURGERY
Payer: MEDICARE

## 2019-12-18 VITALS — DIASTOLIC BLOOD PRESSURE: 62 MMHG | SYSTOLIC BLOOD PRESSURE: 127 MMHG | HEART RATE: 75 BPM

## 2019-12-18 DIAGNOSIS — Z95.828 HISTORY OF ARTERIAL BYPASS OF LOWER EXTREMITY: Primary | ICD-10-CM

## 2019-12-18 DIAGNOSIS — Z95.828 HISTORY OF ARTERIAL BYPASS OF LOWER EXTREMITY: ICD-10-CM

## 2019-12-18 DIAGNOSIS — Z89.511 STATUS POST BELOW KNEE AMPUTATION, RIGHT (H): ICD-10-CM

## 2019-12-18 DIAGNOSIS — I73.9 PAD (PERIPHERAL ARTERY DISEASE) (H): ICD-10-CM

## 2019-12-18 PROCEDURE — G0463 HOSPITAL OUTPT CLINIC VISIT: HCPCS

## 2019-12-18 PROCEDURE — 93926 LOWER EXTREMITY STUDY: CPT | Mod: LT

## 2019-12-18 PROCEDURE — 99213 OFFICE O/P EST LOW 20 MIN: CPT | Mod: ZP | Performed by: SURGERY

## 2019-12-18 NOTE — NURSING NOTE
"Tad Manning is a 79 year old male who presents for:  Chief Complaint   Patient presents with     RECHECK     (VHC 10:00; TJG 11:00) History of left above knee popliteal to below knee popliteal bypass in 2014; 1 year follow up to 12/19/18 appointment with Dr. Nicanor Fenton:    Vitals:    12/18/19 1037   BP: 127/62   BP Location: Right arm   Patient Position: Chair   Cuff Size: Adult Regular   Pulse: 75       BMI:  Estimated body mass index is 24.19 kg/m  as calculated from the following:    Height as of 8/12/19: 5' 10.5\" (1.791 m).    Weight as of 8/12/19: 171 lb (77.6 kg).    Pain Score:  Data Unavailable        Eloisa Moreno MA    "

## 2019-12-18 NOTE — PROGRESS NOTES
Tad Manning is a 79-year-old diabetic who is status post a left above-knee popliteal to below-knee popliteal bypass in 2014.  I utilized reverse greater saphenous vein for that procedure.  He is also status post a right below-knee amputation performed by me on 10/11/2018.  He presents today for annual noninvasive follow-up of his left leg bypass.    He ambulates on a limited basis utilizing a right leg prosthesis and a walker.  He is still working on trying to increase his left leg strength for balance.  Relative to his lower extremities he has no other complaints.    Exam:  Well-developed male in no acute distress.  Blood pressure 127/62 with a pulse of 75.  Right leg below-knee prosthesis.  I did not examine the stump today.  Hima tells me that it looks fine.  All left leg surgical incisions well-healed.  His left foot is pink and warm with a well-healed left second toe amputation site.  Strong left-sided pedal Doppler signals.    Imaging:  ULTRASOUND LOWER EXTREMITY ARTERIAL DUPLEX LEFT 12/18/2019 10:33 AM     HISTORY: 79-year-old patient with history of left above knee popliteal  to below knee popliteal surgical arterial bypass graft created in  2014.     COMPARISON: December 19, 2018.     TECHNIQUE: Color Doppler and spectral waveform analysis performed  throughout the surgical bypass graft and adjacent native arteries.     FINDINGS: The left surgical arterial bypass graft is patent. No  velocity elevation to suggest stenosis. Velocities are diminished in  the distal graft, decreased to 32/2 cm/second, though previously 33/0  cm/second. No visible stenosis.                                                                      IMPRESSION: Patent left lower extremity surgical arterial bypass  graft. No evidence of stenosis.    ASSESSMENT:  1.  6 years status post a left above-knee popliteal to below-knee popliteal bypass with reverse greater saphenous vein clinically doing well with widely patent bypass  graft.    2.  1 year status post right below-knee amputation also doing well from that standpoint.     RECOMMENDATION:  I reviewed all the above with Hima.  I have no lower extremity vascular concerns.  He will continue his medical regimen which includes daily aspirin and Coumadin for atrial fibrillation.  Vascular surgical follow-up will be with me in 1 year for a repeat ultrasound of his left leg bypass graft.    Total face-to-face time was 15 minutes, greater than 50% spent providing counseling and education.    Solis Vick MD

## 2020-01-06 ENCOUNTER — ANTICOAGULATION THERAPY VISIT (OUTPATIENT)
Dept: ANTICOAGULATION | Facility: CLINIC | Age: 80
End: 2020-01-06

## 2020-01-06 DIAGNOSIS — Z79.01 LONG TERM CURRENT USE OF ANTICOAGULANT THERAPY: ICD-10-CM

## 2020-01-06 DIAGNOSIS — I48.0 PAROXYSMAL ATRIAL FIBRILLATION (H): ICD-10-CM

## 2020-01-06 LAB
CAPILLARY BLOOD COLLECTION: NORMAL
INR BLD: 1.9 (ref 0.86–1.14)

## 2020-01-06 PROCEDURE — 36416 COLLJ CAPILLARY BLOOD SPEC: CPT | Performed by: INTERNAL MEDICINE

## 2020-01-06 PROCEDURE — 85610 PROTHROMBIN TIME: CPT | Mod: QW | Performed by: INTERNAL MEDICINE

## 2020-01-06 NOTE — PROGRESS NOTES
ANTICOAGULATION FOLLOW-UP CLINIC VISIT    Patient Name:  Tad Manning  Date:  2020  Contact Type:  Telephone    SUBJECTIVE:  Patient Findings     Comments:   Left message for patient to continue weekly Coumadin dosing pattern.          Clinical Outcomes     Comments:   Left message for patient to continue weekly Coumadin dosing pattern.             OBJECTIVE    INR Point of Care   Date Value Ref Range Status   2020 1.9 (H) 0.86 - 1.14 Final     Comment:     This test is intended for monitoring Coumadin therapy.  Results are not   accurate in patients with prolonged INR due to factor deficiency.         ASSESSMENT / PLAN  INR assessment SUB    Recheck INR In: 2 WEEKS    INR Location Clinic      Anticoagulation Summary  As of 2020    INR goal:   2.0-3.0   TTR:   62.1 % (1 y)   INR used for dosin.9! (2020)   Warfarin maintenance plan:   7.5 mg (5 mg x 1.5) every Mon, Fri; 5 mg (5 mg x 1) all other days   Full warfarin instructions:   7.5 mg every Mon, Fri; 5 mg all other days   Weekly warfarin total:   40 mg   No change documented:   Darien Ballard RN   Plan last modified:   Vicki Hearn RN (2019)   Next INR check:   2020   Priority:   High   Target end date:   Indefinite    Indications    Atrial fibrillation -- on Warfarin [I48.91]  Long-term (current) use of anticoagulants [Z79.01] [Z79.01]             Anticoagulation Episode Summary     INR check location:       Preferred lab:       Send INR reminders to:   [facility]    Comments:   HIPPA Infor returned. OK to  on cell [phone].  labs @ [facility] faxed there .  fax: [phone]    Goes by Hima      Anticoagulation Care Providers     Provider Role Specialty Phone number    Clarisse Valdes MD Responsible Cardiology [phone]            See the Encounter Report to view Anticoagulation Flowsheet and Dosing Calendar (Go to Encounters tab in chart review, and find the Anticoagulation  Therapy Visit)    INR/CFX/F2 RESULT: INR result is 1.9    ASSESSMENT:Left message for patient with results and dosing recommendations. Asked patient to call back to report any missed doses, falls, signs and symptoms of bleeding or clotting, any changes in health, medication, or diet. Asked patient to call back with any questions or concerns.    DOSING ADJUSTMENT: continue current maintenance dose    NEXT INR/FACTOR X OR FACTOR II: 1/20/20    PROTOCOL FOLLOWED: goal 2-3    Darien Ballard RN

## 2020-01-08 DIAGNOSIS — I50.22 CHRONIC SYSTOLIC HEART FAILURE (H): Primary | ICD-10-CM

## 2020-01-09 ENCOUNTER — HOSPITAL ENCOUNTER (INPATIENT)
Facility: CLINIC | Age: 80
LOS: 4 days | Discharge: HOME HEALTH | DRG: 638 | End: 2020-01-13
Attending: STUDENT IN AN ORGANIZED HEALTH CARE EDUCATION/TRAINING PROGRAM | Admitting: HOSPITALIST
Payer: MEDICARE

## 2020-01-09 ENCOUNTER — OFFICE VISIT (OUTPATIENT)
Dept: CARDIOLOGY | Facility: CLINIC | Age: 80
End: 2020-01-09
Attending: NURSE PRACTITIONER
Payer: MEDICARE

## 2020-01-09 VITALS
OXYGEN SATURATION: 97 % | BODY MASS INDEX: 23.51 KG/M2 | HEART RATE: 98 BPM | WEIGHT: 167.9 LBS | SYSTOLIC BLOOD PRESSURE: 121 MMHG | DIASTOLIC BLOOD PRESSURE: 74 MMHG | HEIGHT: 71 IN

## 2020-01-09 DIAGNOSIS — I50.22 CHRONIC SYSTOLIC HEART FAILURE (H): Primary | ICD-10-CM

## 2020-01-09 DIAGNOSIS — I48.0 PAROXYSMAL ATRIAL FIBRILLATION (H): ICD-10-CM

## 2020-01-09 DIAGNOSIS — I50.22 CHRONIC SYSTOLIC HEART FAILURE (H): ICD-10-CM

## 2020-01-09 DIAGNOSIS — I10 ESSENTIAL HYPERTENSION: ICD-10-CM

## 2020-01-09 DIAGNOSIS — I25.5 ISCHEMIC CARDIOMYOPATHY: ICD-10-CM

## 2020-01-09 DIAGNOSIS — N18.3 TYPE 2 DIABETES MELLITUS WITH STAGE 3 CHRONIC KIDNEY DISEASE, UNSPECIFIED WHETHER LONG TERM INSULIN USE: Primary | ICD-10-CM

## 2020-01-09 DIAGNOSIS — E11.22 TYPE 2 DIABETES MELLITUS WITH STAGE 3 CHRONIC KIDNEY DISEASE, UNSPECIFIED WHETHER LONG TERM INSULIN USE: Primary | ICD-10-CM

## 2020-01-09 PROBLEM — R73.9 HYPERGLYCEMIA: Status: ACTIVE | Noted: 2020-01-09

## 2020-01-09 LAB
ANION GAP SERPL CALCULATED.3IONS-SCNC: 6 MMOL/L (ref 3–14)
ANION GAP SERPL CALCULATED.3IONS-SCNC: 9 MMOL/L (ref 3–14)
BUN SERPL-MCNC: 30 MG/DL (ref 7–30)
BUN SERPL-MCNC: 32 MG/DL (ref 7–30)
CALCIUM SERPL-MCNC: 9.7 MG/DL (ref 8.5–10.1)
CALCIUM SERPL-MCNC: 9.8 MG/DL (ref 8.5–10.1)
CHLORIDE SERPL-SCNC: 92 MMOL/L (ref 94–109)
CHLORIDE SERPL-SCNC: 95 MMOL/L (ref 94–109)
CO2 SERPL-SCNC: 25 MMOL/L (ref 20–32)
CO2 SERPL-SCNC: 28 MMOL/L (ref 20–32)
CREAT SERPL-MCNC: 1.24 MG/DL (ref 0.66–1.25)
CREAT SERPL-MCNC: 1.33 MG/DL (ref 0.66–1.25)
GFR SERPL CREATININE-BSD FRML MDRD: 51 ML/MIN/{1.73_M2}
GFR SERPL CREATININE-BSD FRML MDRD: 56 ML/MIN/{1.73_M2}
GLUCOSE BLDC GLUCOMTR-MCNC: 542 MG/DL (ref 70–99)
GLUCOSE SERPL-MCNC: 537 MG/DL (ref 70–99)
GLUCOSE SERPL-MCNC: 660 MG/DL (ref 70–99)
HBA1C MFR BLD: 14.1 % (ref 0–5.6)
INR PPP: 1.95 (ref 0.86–1.14)
POTASSIUM SERPL-SCNC: 4.4 MMOL/L (ref 3.4–5.3)
POTASSIUM SERPL-SCNC: 4.6 MMOL/L (ref 3.4–5.3)
SODIUM SERPL-SCNC: 126 MMOL/L (ref 133–144)
SODIUM SERPL-SCNC: 129 MMOL/L (ref 133–144)

## 2020-01-09 PROCEDURE — 80048 BASIC METABOLIC PNL TOTAL CA: CPT | Performed by: HOSPITALIST

## 2020-01-09 PROCEDURE — 85025 COMPLETE CBC W/AUTO DIFF WBC: CPT | Performed by: HOSPITALIST

## 2020-01-09 PROCEDURE — 00000146 ZZHCL STATISTIC GLUCOSE BY METER IP

## 2020-01-09 PROCEDURE — 99214 OFFICE O/P EST MOD 30 MIN: CPT | Mod: ZP | Performed by: NURSE PRACTITIONER

## 2020-01-09 PROCEDURE — 99223 1ST HOSP IP/OBS HIGH 75: CPT | Mod: AI | Performed by: HOSPITALIST

## 2020-01-09 PROCEDURE — 83036 HEMOGLOBIN GLYCOSYLATED A1C: CPT | Performed by: HOSPITALIST

## 2020-01-09 PROCEDURE — G0463 HOSPITAL OUTPT CLINIC VISIT: HCPCS | Mod: ZF

## 2020-01-09 PROCEDURE — 85610 PROTHROMBIN TIME: CPT | Performed by: HOSPITALIST

## 2020-01-09 PROCEDURE — 80048 BASIC METABOLIC PNL TOTAL CA: CPT | Performed by: NURSE PRACTITIONER

## 2020-01-09 PROCEDURE — 36415 COLL VENOUS BLD VENIPUNCTURE: CPT | Performed by: NURSE PRACTITIONER

## 2020-01-09 PROCEDURE — 25000132 ZZH RX MED GY IP 250 OP 250 PS 637: Mod: GY | Performed by: HOSPITALIST

## 2020-01-09 PROCEDURE — 36415 COLL VENOUS BLD VENIPUNCTURE: CPT | Performed by: HOSPITALIST

## 2020-01-09 PROCEDURE — 21000001 ZZH R&B HEART CARE

## 2020-01-09 RX ORDER — METOPROLOL SUCCINATE 100 MG/1
100 TABLET, EXTENDED RELEASE ORAL DAILY
Qty: 90 TABLET | Refills: 3 | Status: SHIPPED | OUTPATIENT
Start: 2020-01-09 | End: 2022-03-09

## 2020-01-09 RX ORDER — METOPROLOL SUCCINATE 100 MG/1
100 TABLET, EXTENDED RELEASE ORAL DAILY
Status: DISCONTINUED | OUTPATIENT
Start: 2020-01-10 | End: 2020-01-13 | Stop reason: HOSPADM

## 2020-01-09 RX ORDER — LISINOPRIL 5 MG/1
5 TABLET ORAL DAILY
Status: DISCONTINUED | OUTPATIENT
Start: 2020-01-10 | End: 2020-01-09

## 2020-01-09 RX ORDER — ATORVASTATIN CALCIUM 20 MG/1
20 TABLET, FILM COATED ORAL AT BEDTIME
Status: DISCONTINUED | OUTPATIENT
Start: 2020-01-09 | End: 2020-01-13 | Stop reason: HOSPADM

## 2020-01-09 RX ORDER — FUROSEMIDE 20 MG
20 TABLET ORAL DAILY
Qty: 90 TABLET | Refills: 3 | Status: SHIPPED | OUTPATIENT
Start: 2020-01-09 | End: 2021-07-30

## 2020-01-09 RX ORDER — PANTOPRAZOLE SODIUM 40 MG/1
40 TABLET, DELAYED RELEASE ORAL DAILY
Status: DISCONTINUED | OUTPATIENT
Start: 2020-01-10 | End: 2020-01-13 | Stop reason: HOSPADM

## 2020-01-09 RX ORDER — WARFARIN SODIUM 5 MG/1
TABLET ORAL
COMMUNITY
End: 2020-05-11

## 2020-01-09 RX ORDER — PANTOPRAZOLE SODIUM 40 MG/1
40 TABLET, DELAYED RELEASE ORAL DAILY
COMMUNITY

## 2020-01-09 RX ORDER — ACETAMINOPHEN 650 MG/1
650 SUPPOSITORY RECTAL EVERY 4 HOURS PRN
Status: DISCONTINUED | OUTPATIENT
Start: 2020-01-09 | End: 2020-01-13 | Stop reason: HOSPADM

## 2020-01-09 RX ORDER — DIGOXIN 125 MCG
125 TABLET ORAL DAILY
Status: DISCONTINUED | OUTPATIENT
Start: 2020-01-10 | End: 2020-01-13 | Stop reason: HOSPADM

## 2020-01-09 RX ORDER — AMOXICILLIN 250 MG
1 CAPSULE ORAL 2 TIMES DAILY PRN
Status: DISCONTINUED | OUTPATIENT
Start: 2020-01-09 | End: 2020-01-13 | Stop reason: HOSPADM

## 2020-01-09 RX ORDER — NALOXONE HYDROCHLORIDE 0.4 MG/ML
.1-.4 INJECTION, SOLUTION INTRAMUSCULAR; INTRAVENOUS; SUBCUTANEOUS
Status: DISCONTINUED | OUTPATIENT
Start: 2020-01-09 | End: 2020-01-13 | Stop reason: HOSPADM

## 2020-01-09 RX ORDER — OXYCODONE AND ACETAMINOPHEN 5; 325 MG/1; MG/1
1 TABLET ORAL EVERY 6 HOURS PRN
Status: DISCONTINUED | OUTPATIENT
Start: 2020-01-09 | End: 2020-01-13 | Stop reason: HOSPADM

## 2020-01-09 RX ORDER — METFORMIN HYDROCHLORIDE 750 MG/1
750 TABLET, EXTENDED RELEASE ORAL 2 TIMES DAILY WITH MEALS
Status: ON HOLD | COMMUNITY
End: 2021-06-27

## 2020-01-09 RX ORDER — ASPIRIN 81 MG/1
81 TABLET ORAL EVERY EVENING
Status: DISCONTINUED | OUTPATIENT
Start: 2020-01-09 | End: 2020-01-13 | Stop reason: HOSPADM

## 2020-01-09 RX ORDER — WARFARIN SODIUM 5 MG/1
5 TABLET ORAL ONCE
Status: COMPLETED | OUTPATIENT
Start: 2020-01-09 | End: 2020-01-10

## 2020-01-09 RX ORDER — AMOXICILLIN 250 MG
2 CAPSULE ORAL 2 TIMES DAILY PRN
Status: DISCONTINUED | OUTPATIENT
Start: 2020-01-09 | End: 2020-01-13 | Stop reason: HOSPADM

## 2020-01-09 RX ORDER — ONDANSETRON 4 MG/1
4 TABLET, ORALLY DISINTEGRATING ORAL EVERY 6 HOURS PRN
Status: DISCONTINUED | OUTPATIENT
Start: 2020-01-09 | End: 2020-01-13 | Stop reason: HOSPADM

## 2020-01-09 RX ORDER — ONDANSETRON 2 MG/ML
4 INJECTION INTRAMUSCULAR; INTRAVENOUS EVERY 6 HOURS PRN
Status: DISCONTINUED | OUTPATIENT
Start: 2020-01-09 | End: 2020-01-13 | Stop reason: HOSPADM

## 2020-01-09 RX ORDER — LIDOCAINE 40 MG/G
CREAM TOPICAL
Status: DISCONTINUED | OUTPATIENT
Start: 2020-01-09 | End: 2020-01-13 | Stop reason: HOSPADM

## 2020-01-09 RX ORDER — DEXTROSE MONOHYDRATE 25 G/50ML
25-50 INJECTION, SOLUTION INTRAVENOUS
Status: DISCONTINUED | OUTPATIENT
Start: 2020-01-09 | End: 2020-01-13 | Stop reason: HOSPADM

## 2020-01-09 RX ORDER — CITALOPRAM HYDROBROMIDE 20 MG/1
20 TABLET ORAL EVERY MORNING
COMMUNITY

## 2020-01-09 RX ORDER — POLYETHYLENE GLYCOL 3350 17 G/17G
17 POWDER, FOR SOLUTION ORAL DAILY PRN
Status: DISCONTINUED | OUTPATIENT
Start: 2020-01-09 | End: 2020-01-13 | Stop reason: HOSPADM

## 2020-01-09 RX ORDER — NICOTINE POLACRILEX 4 MG
15-30 LOZENGE BUCCAL
Status: DISCONTINUED | OUTPATIENT
Start: 2020-01-09 | End: 2020-01-13 | Stop reason: HOSPADM

## 2020-01-09 RX ORDER — CITALOPRAM HYDROBROMIDE 20 MG/1
20 TABLET ORAL EVERY EVENING
Status: DISCONTINUED | OUTPATIENT
Start: 2020-01-09 | End: 2020-01-13 | Stop reason: HOSPADM

## 2020-01-09 RX ORDER — ACETAMINOPHEN 325 MG/1
650 TABLET ORAL EVERY 4 HOURS PRN
Status: DISCONTINUED | OUTPATIENT
Start: 2020-01-09 | End: 2020-01-13 | Stop reason: HOSPADM

## 2020-01-09 RX ORDER — TRAZODONE HYDROCHLORIDE 50 MG/1
150 TABLET, FILM COATED ORAL AT BEDTIME
Status: ON HOLD | COMMUNITY
End: 2021-06-25

## 2020-01-09 RX ADMIN — CITALOPRAM HYDROBROMIDE 20 MG: 20 TABLET ORAL at 23:39

## 2020-01-09 RX ADMIN — ATORVASTATIN CALCIUM 20 MG: 20 TABLET, FILM COATED ORAL at 23:26

## 2020-01-09 RX ADMIN — TRAZODONE HYDROCHLORIDE 150 MG: 100 TABLET ORAL at 23:26

## 2020-01-09 RX ADMIN — MELATONIN 5 MG TABLET 5 MG: at 23:39

## 2020-01-09 RX ADMIN — ASPIRIN 81 MG: 81 TABLET, COATED ORAL at 23:39

## 2020-01-09 ASSESSMENT — ACTIVITIES OF DAILY LIVING (ADL)
DRESS: 0-->INDEPENDENT
TRANSFERRING: 0-->INDEPENDENT
SWALLOWING: 0-->SWALLOWS FOODS/LIQUIDS WITHOUT DIFFICULTY
COGNITION: 0 - NO COGNITION ISSUES REPORTED
FALL_HISTORY_WITHIN_LAST_SIX_MONTHS: NO
TOILETING: 0-->INDEPENDENT
BATHING: 0-->INDEPENDENT
AMBULATION: 0-->INDEPENDENT
RETIRED_EATING: 0-->INDEPENDENT
RETIRED_COMMUNICATION: 0-->UNDERSTANDS/COMMUNICATES WITHOUT DIFFICULTY

## 2020-01-09 ASSESSMENT — MIFFLIN-ST. JEOR
SCORE: 1500.78
SCORE: 1476.25

## 2020-01-09 ASSESSMENT — PAIN SCALES - GENERAL: PAINLEVEL: NO PAIN (0)

## 2020-01-09 NOTE — NURSING NOTE
Chief Complaint   Patient presents with     Follow Up     Return CORE, 80 yo male, with a past medical history of CLL in remission, CAD s/p CABG in 1995, HFrEF, atrial flutter, PVD s/p iliac artery stents and left lower extremity bypass, hypertension, diabetes, hyperlipidemia, discitis and multple admission for decompensated ICM/HFrEF who presents for follow-up of cardiomyopathy.      Vitals were taken and medications were reconciled.     Karina DAMONA  11:03 AM

## 2020-01-09 NOTE — PATIENT INSTRUCTIONS
Take your medicines every day, as directed    Changes made today:  o Metoprolol refill has been sent to your pharmacy- Start with 1/2 tab for 1 week then go to 1 full tab daily after that.   o Lasix has been refilled  o Entresto is the medication we are    Monitor Your Weight and Symptoms    Contact us if you:      Gain 2 pounds in one day or 5 pounds in one week    Feel more short of breath    Notice more leg swelling    Feel lightheadeded   Change your lifestyle    Limit Salt or Sodium:    2000 mg  Limit Fluids:    2000 mL or approximately 64 ounces  Eat a Heart Healthy Diet    Low in saturated fats  Stay Active:    Aim to move at least 150 minutes every  week         To Contact us    During Business Hours:  [phone], option # 1 (University)  Then option # 4 (medical questions)     After hours, weekends or holidays:   [phone], Option #4  Ask to speak to the On-Call Cardiologist. Inform them you are a CORE/heart failure patient at the [facility].     Use [employer] allows you to communicate directly with your heart team through secure messaging.    View the Space can be accessed any time on your phone, computer, or tablet.    If you need assistance, we'd be happy to help!         Keep your Heart Appointments:    Follow up with [facility]- Dai Sabillon in 6 weeks        I performed a history and physical exam of the patient and discussed their management with the resident and /or advanced care provider. I reviewed the resident and /or ACP's note and agree with the documented findings and plan of care. My medical decison making and observations are found above.  moving all 4 awake and alert, rt arm with nl neuro exam

## 2020-01-09 NOTE — NURSING NOTE
Pt's BG is 660. Provider notified, pt will call PCP and if not able to be seen today will go to ED. Also called pt's sister Joyce who stated she will make sure he gets seen today. Kizzy Oakes RN CORE Care Coordinator

## 2020-01-09 NOTE — LETTER
1/9/2020      RE: Tad Manning  [address]  [place] [address]       Dear Colleague,    Thank you for the opportunity to participate in the care of your patient, Tad Manning, at the [facility]. Please see a copy of my visit note below.    HPI:   Mr. Manning is a 79 year old male with a past medical history including CLL in remission, CAD s/p CABG in 1995, HFrEF 2/2 ICM, atrial flutter, PVD s/p iliac artery stents and left lower extremity bypass, hypertension, diabetes, hyperlipidemia, discitis and multple admission for decompensated heart failure. Presents to clinic for CORE f/u.    Since last visit with Dr Valdes, he reports feeling fine. He has not had issues with fluid retention. Remains on low dose diuretic. When he is fluid-up, he notes abdominal bloating, but this has been stable. He denies orthopnea, PND, LE edema. Patient denies any chest pain or shortness of breath with current level of exertion. His activity is restricted by leg pain. He does endorse a dry mouth currently, denies lightheadedness or orthostasis. No palpitations or syncope. He does not check BP at home. States he has not taken metoprolol in about one week because he ran out.     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)      Coronary artery disease     triple bypass 1995     Diabetes mellitus (H)      Diabetic polyneuropathy (H)      History of blood transfusion      Hyperlipidaemia      Hypertension      Non-healing ulcer of foot (H)     Right     Noninflammatory pericardial effusion      Osteomyelitis of left foot (H)      PVD (peripheral vascular disease) (H)      Sebaceous cyst        FAMILY  HISTORY:  Family History   Problem Relation Age of Onset     Cancer Mother         leukemia       SOCIAL HISTORY:  Socioeconomic History     Marital status: Single   Tobacco Use     Smoking status: Former Smoker     Packs/day: 2.00     Years: 30.00     Pack years: 60.00     Types: Cigarettes     Last attempt to quit: 1995     Years since quittin.5     Smokeless tobacco: Never Used   Other Topics Concern     Parent/sibling w/ CABG, MI or angioplasty before 65F 55M? Not Asked   Social History Narrative    Lives independently with SO. 2017        CURRENT MEDICATIONS:  aspirin (ASA) 81 MG tablet, Take 1 tablet (81 mg) by mouth daily  atorvastatin (LIPITOR) 20 MG tablet, Take 1 tablet (20 mg) by mouth daily  cholecalciferol 1000 UNITS TABS, Take 1,000 Units by mouth daily  Citalopram Hydrobromide (CELEXA PO), Take 10 mg by mouth daily  digoxin (LANOXIN) 125 MCG tablet, Take 1 tablet (125 mcg) by mouth daily  ferrous sulfate (IRON) 325 (65 Fe) MG tablet, Take 1 tablet (325 mg) by mouth daily  lisinopril (PRINIVIL/ZESTRIL) 5 MG tablet, Take 1 tablet (5 mg) by mouth daily  MELATONIN PO, Take 5 mg by mouth daily  METFORMIN HCL PO, Take 750 mg by mouth 2 times daily (with meals)  metoprolol succinate ER (TOPROL-XL) 100 MG 24 hr tablet, Take 1 tablet (100 mg) by mouth daily  nitroglycerin (NITROSTAT) 0.4 MG sublingual tablet, For chest pain place 1 tablet under the tongue every 5 minutes for 3 doses. If symptoms persist 5 minutes after 1st dose call 911.  nystatin (MYCOSTATIN) 057623 UNIT/GM external cream, Apply topically 2 times daily  order for DME, Equipment being ordered: [facility] shoe  oxyCODONE IR (ROXICODONE) 5 MG tablet, Take 1-2 tablets (5-10 mg) by mouth every 4 hours as needed for pain  pantoprazole (PROTONIX) 20 MG EC tablet, Take 40 mg by mouth daily  polyethylene glycol (MIRALAX/GLYCOLAX) Packet, Take 1 packet by mouth daily as needed   PROVIDER DOSED MANAGED WARFARIN, COUMADIN, OUTPATIENT, Dose based  "on INR per Primary MD/Nursing Home MD.  sodium chloride (EQ SALINE NASAL SPRAY) 0.65 % nasal spray, Spray 1 spray into both nostrils daily as needed for congestion  traZODone (DESYREL) 100 MG tablet, Take 100 mg by mouth At Bedtime  warfarin (COUMADIN) 5 MG tablet, 7.5mg MWF, and 5mg TuThSatSun, or as directed by the Medication Monitoring Clinic at the [facility].  Warfarin Therapy Reminder, 1 each continuous prn  Acetaminophen (TYLENOL PO), Take 1,000 mg by mouth every 6 hours  hydrocortisone (CORTIZONE-10) 1 % ointment, Apply topically 2 times daily as needed for itching    No current facility-administered medications on file prior to visit.       ROS:   CONSTITUTIONAL: Denies fever, chills, fatigue, or weight fluctuations.   HEENT: Denies headache, vision changes, and changes in speech.   CV: Refer to HPI.   PULMONARY:Refer to HPI.   GI:Denies nausea, vomiting, diarrhea, and abdominal pain. Bowel movements are regular.   :Denies urinary alterations, dysuria, urinary frequency, hematuria, and abnormal drainage.   EXT:Denies lower extremity edema.   SKIN:Denies abnormal rashes or lesions.   MUSCULOSKELETAL:Denies upper or lower extremity weakness and pain.   NEUROLOGIC:Denies lightheadedness, dizziness, seizures, or upper or lower extremity paresthesia.     EXAM:  /74 (BP Location: Right arm, Patient Position: Chair, Cuff Size: Adult Regular)   Pulse 98   Ht 1.791 m (5' 10.5\")   Wt 76.2 kg (167 lb 14.4 oz)   SpO2 97%   BMI 23.75 kg/m        GENERAL: Appears comfortable, in no acute distress.   HEENT: Eye symmetrical, no discharge or icterus bilaterally. Mucous membranes moist and without lesions.  CV: RRR, +S1S2, no murmur, rub, or gallop. JVP not visible at 60 degrees.   RESPIRATORY: Respirations regular, even, and unlabored. Lungs CTA throughout.   GI: Soft and non distended with normoactive bowel sounds present in all quadrants. No tenderness, rebound, guarding. No hepatomegaly.   EXTREMITIES: No " peripheral edema. 2+ bilateral pedal pulses.   NEUROLOGIC: Alert and oriented x 3. No focal deficits.   MUSCULOSKELETAL: No joint swelling or tenderness.   SKIN: No jaundice. No rashes or lesions.     Labs, reviewed with patient in clinic today:  CBC RESULTS:  Lab Results   Component Value Date    WBC 4.2 10/22/2018    RBC 2.94 (L) 10/22/2018    HGB 12.6 (L) 01/14/2019    HCT 27.0 (L) 10/22/2018    MCV 91.8 10/22/2018    MCH 30.0 10/14/2018    MCHC 33.9 10/14/2018    RDW 16.5 (H) 10/22/2018     10/22/2018       CMP RESULTS:  Lab Results   Component Value Date     08/12/2019    POTASSIUM 4.5 08/12/2019    CHLORIDE 105 08/12/2019    CO2 28 08/12/2019    ANIONGAP 5 08/12/2019     (H) 08/12/2019    BUN 23 08/12/2019    CR 1.08 08/12/2019    GFRESTIMATED 66 08/12/2019    GFRESTBLACK 76 08/12/2019    JUSTINO 9.0 08/12/2019    BILITOTAL 0.6 10/03/2018    ALBUMIN 2.0 (L) 10/03/2018    ALKPHOS 73 10/03/2018    ALT 14 10/03/2018    AST 15 10/03/2018        INR RESULTS:  Lab Results   Component Value Date    INR 1.9 (H) 01/06/2020    INR 1.93 (H) 09/16/2019       Lab Results   Component Value Date    MAG 1.8 09/22/2018     Lab Results   Component Value Date    NTBNPI 5,223 (H) 10/08/2018     Lab Results   Component Value Date    NTBNP 22,172 (H) 02/09/2018       Diagnostics:  TTE 10/5/18  The visual ejection fraction is estimated at 35-40%.  There is mild-moderate global hypokinesia of the left ventricle.  Mildly decreased right ventricular systolic function  In comparison with the previous study the LVEF has improved slightly.    Assessment and Plan:   Mr. Manning is a 79 year old male with chronic system heart failure 2/2 ischemic cardiomyopathy who [place] [facility] [place]. His last echo in 2018 shows EF 35-40%. Unfortunately did not make GDMT changes today as he has been off metoprolol for >1 week. We will attempt to change lisinopril to Entresto if insurance allows.      # Chronic systolic heart  failure/HFrEF secondary to ICM    Stage C. NYHA Class IIIA    Fluid status: euvolemic on lasix 20 mg daily  ACEi/ARB/ARNI: lisinopril 5 mg daily, will attempt to swtich to Entresto next visit  BB: metoprolol  mg, will have him start with 1/2 prior dose x 1 week then resume full dose as he has not been taking this in one week  Aldosterone antagonist: defer  SCD prophylaxis: n/a EF>35%  NSAID use: contraindicated  Sleep apnea evaluation: deferred discussion  Remote monitoring: consider at future visits    # CAD s/p CABG  # PAD s/p iliac stenting and LE bypass  -no symptoms to suggest angina, he is on asa and statin (defer high potency to cardiologist); he has stable claudication    # Atrial flutter  -he is on BB and digoxin and warfarin        Follow up in six weeks in CORE      25 minutes spent face-to-face with patient, >50% in counseling and/or coordination of care as described above    Dai Sabillon DNP, NP-C  1/9/2020    FABY ZURITA

## 2020-01-09 NOTE — PROGRESS NOTES
HPI:   Mr. Manning is a 79 year old male with a past medical history including CLL in remission, CAD s/p CABG in 1995, HFrEF 2/2 ICM, atrial flutter, PVD s/p iliac artery stents and left lower extremity bypass, hypertension, diabetes, hyperlipidemia, discitis and multple admission for decompensated heart failure. Presents to clinic for CORE f/u.    Since last visit with Dr Valdes, he reports feeling fine. He has not had issues with fluid retention. Remains on low dose diuretic. When he is fluid-up, he notes abdominal bloating, but this has been stable. He denies orthopnea, PND, LE edema. Patient denies any chest pain or shortness of breath with current level of exertion. His activity is restricted by leg pain. He does endorse a dry mouth currently, denies lightheadedness or orthostasis. No palpitations or syncope. He does not check BP at home. States he has not taken metoprolol in about one week because he ran out.     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)      Coronary artery disease     triple bypass 1995     Diabetes mellitus (H)      Diabetic polyneuropathy (H)      History of blood transfusion      Hyperlipidaemia      Hypertension      Non-healing ulcer of foot (H)     Right     Noninflammatory pericardial effusion      Osteomyelitis of left foot (H)      PVD (peripheral vascular disease) (H)      Sebaceous cyst        FAMILY HISTORY:  Family History   Problem Relation Age of Onset     Cancer Mother         leukemia       SOCIAL HISTORY:  Socioeconomic History     Marital status: Single   Tobacco Use     Smoking status: Former Smoker     Packs/day: 2.00     Years: 30.00     Pack years: 60.00     Types: Cigarettes     Last attempt to quit: 6/19/1995     Years  since quittin.5     Smokeless tobacco: Never Used   Other Topics Concern     Parent/sibling w/ CABG, MI or angioplasty before 65F 55M? Not Asked   Social History Narrative    Lives independently with SO. 2017        CURRENT MEDICATIONS:  aspirin (ASA) 81 MG tablet, Take 1 tablet (81 mg) by mouth daily  atorvastatin (LIPITOR) 20 MG tablet, Take 1 tablet (20 mg) by mouth daily  cholecalciferol 1000 UNITS TABS, Take 1,000 Units by mouth daily  Citalopram Hydrobromide (CELEXA PO), Take 10 mg by mouth daily  digoxin (LANOXIN) 125 MCG tablet, Take 1 tablet (125 mcg) by mouth daily  ferrous sulfate (IRON) 325 (65 Fe) MG tablet, Take 1 tablet (325 mg) by mouth daily  lisinopril (PRINIVIL/ZESTRIL) 5 MG tablet, Take 1 tablet (5 mg) by mouth daily  MELATONIN PO, Take 5 mg by mouth daily  METFORMIN HCL PO, Take 750 mg by mouth 2 times daily (with meals)  metoprolol succinate ER (TOPROL-XL) 100 MG 24 hr tablet, Take 1 tablet (100 mg) by mouth daily  nitroglycerin (NITROSTAT) 0.4 MG sublingual tablet, For chest pain place 1 tablet under the tongue every 5 minutes for 3 doses. If symptoms persist 5 minutes after 1st dose call 911.  nystatin (MYCOSTATIN) 886329 UNIT/GM external cream, Apply topically 2 times daily  order for DME, Equipment being ordered: [facility] access hospital shoe  oxyCODONE IR (ROXICODONE) 5 MG tablet, Take 1-2 tablets (5-10 mg) by mouth every 4 hours as needed for pain  pantoprazole (PROTONIX) 20 MG EC tablet, Take 40 mg by mouth daily  polyethylene glycol (MIRALAX/GLYCOLAX) Packet, Take 1 packet by mouth daily as needed   PROVIDER DOSED MANAGED WARFARIN, COUMADIN, OUTPATIENT, Dose based on INR per Primary MD/Nursing Home MD.  sodium chloride (EQ SALINE NASAL SPRAY) 0.65 % nasal spray, Spray 1 spray into both nostrils daily as needed for congestion  traZODone (DESYREL) 100 MG tablet, Take 100 mg by mouth At Bedtime  warfarin (COUMADIN) 5 MG tablet, 7.5mg MWF, and 5mg TuThSatSun, or as directed by the Medication Monitoring  "Clinic at the [facility].  Warfarin Therapy Reminder, 1 each continuous prn  Acetaminophen (TYLENOL PO), Take 1,000 mg by mouth every 6 hours  hydrocortisone (CORTIZONE-10) 1 % ointment, Apply topically 2 times daily as needed for itching    No current facility-administered medications on file prior to visit.       ROS:   CONSTITUTIONAL: Denies fever, chills, fatigue, or weight fluctuations.   HEENT: Denies headache, vision changes, and changes in speech.   CV: Refer to HPI.   PULMONARY:Refer to HPI.   GI:Denies nausea, vomiting, diarrhea, and abdominal pain. Bowel movements are regular.   :Denies urinary alterations, dysuria, urinary frequency, hematuria, and abnormal drainage.   EXT:Denies lower extremity edema.   SKIN:Denies abnormal rashes or lesions.   MUSCULOSKELETAL:Denies upper or lower extremity weakness and pain.   NEUROLOGIC:Denies lightheadedness, dizziness, seizures, or upper or lower extremity paresthesia.     EXAM:  /74 (BP Location: Right arm, Patient Position: Chair, Cuff Size: Adult Regular)   Pulse 98   Ht 1.791 m (5' 10.5\")   Wt 76.2 kg (167 lb 14.4 oz)   SpO2 97%   BMI 23.75 kg/m       GENERAL: Appears comfortable, in no acute distress.   HEENT: Eye symmetrical, no discharge or icterus bilaterally. Mucous membranes moist and without lesions.  CV: RRR, +S1S2, no murmur, rub, or gallop. JVP not visible at 60 degrees.   RESPIRATORY: Respirations regular, even, and unlabored. Lungs CTA throughout.   GI: Soft and non distended with normoactive bowel sounds present in all quadrants. No tenderness, rebound, guarding. No hepatomegaly.   EXTREMITIES: No peripheral edema. 2+ bilateral pedal pulses.   NEUROLOGIC: Alert and oriented x 3. No focal deficits.   MUSCULOSKELETAL: No joint swelling or tenderness.   SKIN: No jaundice. No rashes or lesions.     Labs, reviewed with patient in clinic today:  CBC RESULTS:  Lab Results   Component Value Date    WBC 4.2 10/22/2018    RBC 2.94 (L) 10/22/2018    " HGB 12.6 (L) 01/14/2019    HCT 27.0 (L) 10/22/2018    MCV 91.8 10/22/2018    MCH 30.0 10/14/2018    MCHC 33.9 10/14/2018    RDW 16.5 (H) 10/22/2018     10/22/2018       CMP RESULTS:  Lab Results   Component Value Date     08/12/2019    POTASSIUM 4.5 08/12/2019    CHLORIDE 105 08/12/2019    CO2 28 08/12/2019    ANIONGAP 5 08/12/2019     (H) 08/12/2019    BUN 23 08/12/2019    CR 1.08 08/12/2019    GFRESTIMATED 66 08/12/2019    GFRESTBLACK 76 08/12/2019    JUSTINO 9.0 08/12/2019    BILITOTAL 0.6 10/03/2018    ALBUMIN 2.0 (L) 10/03/2018    ALKPHOS 73 10/03/2018    ALT 14 10/03/2018    AST 15 10/03/2018        INR RESULTS:  Lab Results   Component Value Date    INR 1.9 (H) 01/06/2020    INR 1.93 (H) 09/16/2019       Lab Results   Component Value Date    MAG 1.8 09/22/2018     Lab Results   Component Value Date    NTBNPI 5,223 (H) 10/08/2018     Lab Results   Component Value Date    NTBNP 22,172 (H) 02/09/2018       Diagnostics:  TTE 10/5/18  The visual ejection fraction is estimated at 35-40%.  There is mild-moderate global hypokinesia of the left ventricle.  Mildly decreased right ventricular systolic function  In comparison with the previous study the LVEF has improved slightly.    Assessment and Plan:   Mr. Manning is a 79 year old male with chronic system heart failure 2/2 ischemic cardiomyopathy who presents to [facility] for follow up. His last echo in 2018 shows EF 35-40%. Unfortunately did not make GDMT changes today as he has been off metoprolol for >1 week. We will attempt to change lisinopril to Entresto if insurance allows.      # Chronic systolic heart failure/HFrEF secondary to ICM    Stage C. NYHA Class IIIA    Fluid status: euvolemic on lasix 20 mg daily  ACEi/ARB/ARNI: lisinopril 5 mg daily, will attempt to swtich to Entresto next visit  BB: metoprolol  mg, will have him start with 1/2 prior dose x 1 week then resume full dose as he has not been taking this in one week  Aldosterone  antagonist: defer  SCD prophylaxis: n/a EF>35%  NSAID use: contraindicated  Sleep apnea evaluation: deferred discussion  Remote monitoring: consider at future visits    # CAD s/p CABG  # PAD s/p iliac stenting and LE bypass  -no symptoms to suggest angina, he is on asa and statin (defer high potency to cardiologist); he has stable claudication    # Atrial flutter  -he is on BB and digoxin and warfarin        Follow up in six weeks in CORE      25 minutes spent face-to-face with patient, >50% in counseling and/or coordination of care as described above    Dai Sabillon, DESHAWN, NP-C  1/9/2020          FABY ZURITA

## 2020-01-10 LAB
ANION GAP SERPL CALCULATED.3IONS-SCNC: 5 MMOL/L (ref 3–14)
BASOPHILS # BLD AUTO: 0 10E9/L (ref 0–0.2)
BASOPHILS NFR BLD AUTO: 0 %
BUN SERPL-MCNC: 29 MG/DL (ref 7–30)
CALCIUM SERPL-MCNC: 9.3 MG/DL (ref 8.5–10.1)
CHLORIDE SERPL-SCNC: 101 MMOL/L (ref 94–109)
CO2 SERPL-SCNC: 27 MMOL/L (ref 20–32)
CREAT SERPL-MCNC: 1.11 MG/DL (ref 0.66–1.25)
DIFFERENTIAL METHOD BLD: ABNORMAL
DIGOXIN SERPL-MCNC: 0.9 UG/L (ref 0.5–2)
EOSINOPHIL # BLD AUTO: 0 10E9/L (ref 0–0.7)
EOSINOPHIL NFR BLD AUTO: 0 %
ERYTHROCYTE [DISTWIDTH] IN BLOOD BY AUTOMATED COUNT: 13.6 % (ref 10–15)
GFR SERPL CREATININE-BSD FRML MDRD: 64 ML/MIN/{1.73_M2}
GLUCOSE BLDC GLUCOMTR-MCNC: 236 MG/DL (ref 70–99)
GLUCOSE BLDC GLUCOMTR-MCNC: 312 MG/DL (ref 70–99)
GLUCOSE BLDC GLUCOMTR-MCNC: 337 MG/DL (ref 70–99)
GLUCOSE BLDC GLUCOMTR-MCNC: 393 MG/DL (ref 70–99)
GLUCOSE SERPL-MCNC: 262 MG/DL (ref 70–99)
HCT VFR BLD AUTO: 34.1 % (ref 40–53)
HGB BLD-MCNC: 12 G/DL (ref 13.3–17.7)
INR PPP: 2.02 (ref 0.86–1.14)
LYMPHOCYTES # BLD AUTO: 4.4 10E9/L (ref 0.8–5.3)
LYMPHOCYTES NFR BLD AUTO: 33 %
MCH RBC QN AUTO: 31.5 PG (ref 26.5–33)
MCHC RBC AUTO-ENTMCNC: 35.2 G/DL (ref 31.5–36.5)
MCV RBC AUTO: 90 FL (ref 78–100)
MONOCYTES # BLD AUTO: 0.4 10E9/L (ref 0–1.3)
MONOCYTES NFR BLD AUTO: 3 %
NEUTROPHILS # BLD AUTO: 8.4 10E9/L (ref 1.6–8.3)
NEUTROPHILS NFR BLD AUTO: 64 %
PLATELET # BLD AUTO: 101 10E9/L (ref 150–450)
PLATELET # BLD EST: ABNORMAL 10*3/UL
POTASSIUM SERPL-SCNC: 3.6 MMOL/L (ref 3.4–5.3)
RBC # BLD AUTO: 3.81 10E12/L (ref 4.4–5.9)
RBC MORPH BLD: ABNORMAL
SMUDGE CELLS BLD QL SMEAR: PRESENT
SODIUM SERPL-SCNC: 133 MMOL/L (ref 133–144)
WBC # BLD AUTO: 13.2 10E9/L (ref 4–11)

## 2020-01-10 PROCEDURE — 80048 BASIC METABOLIC PNL TOTAL CA: CPT | Performed by: HOSPITALIST

## 2020-01-10 PROCEDURE — 99207 ZZC CDG-MDM COMPONENT: MEETS MODERATE - UP CODED: CPT | Performed by: STUDENT IN AN ORGANIZED HEALTH CARE EDUCATION/TRAINING PROGRAM

## 2020-01-10 PROCEDURE — 25000132 ZZH RX MED GY IP 250 OP 250 PS 637: Mod: GY | Performed by: STUDENT IN AN ORGANIZED HEALTH CARE EDUCATION/TRAINING PROGRAM

## 2020-01-10 PROCEDURE — 80162 ASSAY OF DIGOXIN TOTAL: CPT | Performed by: HOSPITALIST

## 2020-01-10 PROCEDURE — 25000131 ZZH RX MED GY IP 250 OP 636 PS 637: Mod: GY | Performed by: STUDENT IN AN ORGANIZED HEALTH CARE EDUCATION/TRAINING PROGRAM

## 2020-01-10 PROCEDURE — 85610 PROTHROMBIN TIME: CPT | Performed by: HOSPITALIST

## 2020-01-10 PROCEDURE — 12000000 ZZH R&B MED SURG/OB

## 2020-01-10 PROCEDURE — 00000146 ZZHCL STATISTIC GLUCOSE BY METER IP

## 2020-01-10 PROCEDURE — 25800030 ZZH RX IP 258 OP 636: Performed by: HOSPITALIST

## 2020-01-10 PROCEDURE — 25000131 ZZH RX MED GY IP 250 OP 636 PS 637: Mod: GY | Performed by: HOSPITALIST

## 2020-01-10 PROCEDURE — 99233 SBSQ HOSP IP/OBS HIGH 50: CPT | Performed by: STUDENT IN AN ORGANIZED HEALTH CARE EDUCATION/TRAINING PROGRAM

## 2020-01-10 PROCEDURE — 36415 COLL VENOUS BLD VENIPUNCTURE: CPT | Performed by: HOSPITALIST

## 2020-01-10 PROCEDURE — 25000132 ZZH RX MED GY IP 250 OP 250 PS 637: Mod: GY | Performed by: HOSPITALIST

## 2020-01-10 RX ORDER — WARFARIN SODIUM 7.5 MG/1
7.5 TABLET ORAL
Status: COMPLETED | OUTPATIENT
Start: 2020-01-10 | End: 2020-01-10

## 2020-01-10 RX ORDER — NYSTATIN 100000 U/G
CREAM TOPICAL 2 TIMES DAILY
Status: DISCONTINUED | OUTPATIENT
Start: 2020-01-10 | End: 2020-01-13 | Stop reason: HOSPADM

## 2020-01-10 RX ORDER — LISINOPRIL 5 MG/1
5 TABLET ORAL DAILY
Status: DISCONTINUED | OUTPATIENT
Start: 2020-01-11 | End: 2020-01-12

## 2020-01-10 RX ORDER — FUROSEMIDE 20 MG
20 TABLET ORAL DAILY
Status: DISCONTINUED | OUTPATIENT
Start: 2020-01-11 | End: 2020-01-12

## 2020-01-10 RX ADMIN — ATORVASTATIN CALCIUM 20 MG: 20 TABLET, FILM COATED ORAL at 21:16

## 2020-01-10 RX ADMIN — CITALOPRAM HYDROBROMIDE 20 MG: 20 TABLET ORAL at 21:16

## 2020-01-10 RX ADMIN — DIGOXIN 125 MCG: 0.12 TABLET ORAL at 08:55

## 2020-01-10 RX ADMIN — WARFARIN SODIUM 7.5 MG: 7.5 TABLET ORAL at 17:45

## 2020-01-10 RX ADMIN — WARFARIN SODIUM 5 MG: 5 TABLET ORAL at 00:44

## 2020-01-10 RX ADMIN — SODIUM CHLORIDE 500 ML: 9 INJECTION, SOLUTION INTRAVENOUS at 00:42

## 2020-01-10 RX ADMIN — PANTOPRAZOLE SODIUM 40 MG: 40 TABLET, DELAYED RELEASE ORAL at 08:55

## 2020-01-10 RX ADMIN — INSULIN GLARGINE 13 UNITS: 100 INJECTION, SOLUTION SUBCUTANEOUS at 21:16

## 2020-01-10 RX ADMIN — NYSTATIN: 100000 CREAM TOPICAL at 22:16

## 2020-01-10 RX ADMIN — MELATONIN 5 MG TABLET 5 MG: at 21:16

## 2020-01-10 RX ADMIN — ASPIRIN 81 MG: 81 TABLET, COATED ORAL at 21:16

## 2020-01-10 RX ADMIN — INSULIN ASPART 5 UNITS: 100 INJECTION, SOLUTION INTRAVENOUS; SUBCUTANEOUS at 21:17

## 2020-01-10 RX ADMIN — TRAZODONE HYDROCHLORIDE 150 MG: 100 TABLET ORAL at 21:16

## 2020-01-10 RX ADMIN — METOPROLOL SUCCINATE 100 MG: 100 TABLET, EXTENDED RELEASE ORAL at 08:55

## 2020-01-10 RX ADMIN — INSULIN ASPART 15 UNITS: 100 INJECTION, SOLUTION INTRAVENOUS; SUBCUTANEOUS at 01:17

## 2020-01-10 ASSESSMENT — ACTIVITIES OF DAILY LIVING (ADL)
ADLS_ACUITY_SCORE: 11
ADLS_ACUITY_SCORE: 13

## 2020-01-10 ASSESSMENT — MIFFLIN-ST. JEOR
SCORE: 1476.25
SCORE: 1449.75

## 2020-01-10 NOTE — PROGRESS NOTES
[facility]    Medicine Progress Note - Hospitalist Service       Date of Admission:  1/9/2020  Date of Service: 01/10/2020    Assessment & Plan   Tad Manning is a 79 year old male admitted on 1/9/2020.  Past history of ischemic cardiomyopathy, HTN, hyperlipidemia, A-fib, CAD s/p CABG and PCI, PVD s/p stenting and fem-pop bypass and BKA, DM II with neuropathy among other chronic medical issues who presents after being found to have blood sugar >600 in cardiology clinic follow up today and was referred to outside ED.    DM II with neuropathy, uncontrolled  Assessment: PTA his DM is managed on metformin alone, no recent A1C prior to admission, does not restrict carbohydrate intake and does not follow blood sugars at home. Glucose at outside ED was 769 and received 10 units Novolog with POC glucose upon arrival of 542. BG better after aspart 15 U on admission. Hgb A1c > 14.0  Plan:  - Lantus 13 U at bedtime, titrate as needed  - hold metformin  - Continue prandial insulin 1 unit/15 g carb tid with meals and high dose ssi    STORM on CKD stage III  Baseline Cr 0.9-1.0, Cr in clinic earlier 1/9 was 1.33.  Suspect pre-renal azotemia due to diuresis with hyperglycemia. Cr now normalized with IVF  Plan:  - Resume PTA lasix and lisinopril in AM    CAD s/p CABG x3 in 1995 and PCI to LAD in 3/2018  HFrEF (EF 35-40%, mildly decreased RV systolic function 10/2018)  A-fib on coumadin  HTN  HLD  - continue PTA aspirin, atorvastatin, digoxin, metoprolol  - Resume lisinopril and lasix  - PharmD to dose coumadin    PVD s/p left iliac artery stenting and fem-pop bypass, right BKA  - aspirin and statin as above       Diet: Moderate Consistent CHO Diet    DVT Prophylaxis: Pneumatic Compression Devices  Dugan Catheter: not present  Code Status: Full Code      Disposition Plan   Expected discharge: Tomorrow, recommended to prior living arrangement once BG controlled.  Entered: Case Benz MD 01/10/2020, 10:33 AM        The patient's care was discussed with the Bedside Nurse and Patient.    Case Benz MD  Hospitalist Service  [facility]    ______________________________________________________________________    Interval History     No acute events since admission  No CP/SOB. BG still very elevated  A1c > 14.0. No nausea/vomiting or abdominal pain  No new complaints today.     Data reviewed today: I reviewed all medications, new labs and imaging results over the last 24 hours. I personally reviewed no images or EKG's today.    Physical Exam   Vital Signs: Temp: 98.3  F (36.8  C) Temp src: Oral BP: 136/71 Pulse: 80 Heart Rate: 69 Resp: 20 SpO2: 99 % O2 Device: None (Room air)    Weight: 164 lbs 3.88 oz    Constitutional: awake, alert, cooperative, no apparent distress.   Respiratory: CTABL   Cardiovascular: RRR with no m/r/g   GI: Normal bowel sounds, soft, non-distended, non-tender. Skin: normal skin color, texture, turgor   Musculoskeletal: There is no redness, warmth, or swelling of the joints. Full range of motion noted.   Neurologic: Awake, alert, oriented to name, place and time. Motor is 5 out of 5 bilaterally. Sensory is intact.   Neuropsychiatric: normal mood and affect    Data   Recent Labs   Lab 01/10/20  0537 01/09/20  2313 01/09/20  1038 01/06/20  1136   WBC  --  13.2*  --   --    HGB  --  12.0*  --   --    MCV  --  90  --   --    PLT  --  101*  --   --    INR 2.02* 1.95*  --  1.9*    129* 126*  --    POTASSIUM 3.6 4.4 4.6  --    CHLORIDE 101 95 92*  --    CO2 27 28 25  --    BUN 29 30 32*  --    CR 1.11 1.24 1.33*  --    ANIONGAP 5 6 9  --    JUSTINO 9.3 9.8 9.7  --    * 537* 660*  --      No results found for this or any previous visit (from the past 24 hour(s)).  Medications     - MEDICATION INSTRUCTIONS -       Warfarin Therapy Reminder         aspirin  81 mg Oral QPM     atorvastatin  20 mg Oral At Bedtime     citalopram  20 mg Oral QPM     digoxin  125 mcg Oral Daily     insulin  aspart   Subcutaneous QAM AC     insulin aspart  1-10 Units Subcutaneous TID AC     insulin aspart  1-7 Units Subcutaneous At Bedtime     insulin glargine  13 Units Subcutaneous At Bedtime     melatonin  5 mg Oral At Bedtime     metoprolol succinate ER  100 mg Oral Daily     pantoprazole  40 mg Oral Daily     sodium chloride (PF)  3 mL Intracatheter Q8H     traZODone  150 mg Oral At Bedtime     warfarin ANTICOAGULANT  7.5 mg Oral ONCE at 18:00

## 2020-01-10 NOTE — PROGRESS NOTES
A&Ox4, calm and coop. VSS. Denies pain. [place] [facility] [place]. Has valuables receipt at bedside. Turn/repos in bed. Mepilex to coccyx.

## 2020-01-10 NOTE — PLAN OF CARE
VSS. Tele: SR 1 AVB. Denies any pain. BG better, but was not checked at lunch. Restarted some home meds. Probable discharge tomorrow. Report given to DARIUSZ Jeronimo on station [id number]. Belongings packed and sent with patient.

## 2020-01-10 NOTE — H&P
[facility]    History and Physical - Hospitalist Service       Date of Admission:  1/9/2020    Assessment & Plan   Tad Manning is a 79 year old male admitted on 1/9/2020.  Past history of ischemic cardiomyopathy, HTN, hyperlipidemia, A-fib, CAD s/p CABG and PCI, PVD s/p stenting and fem-pop bypass and BKA, DM II with neuropathy among other chronic medical issues who presents after being found to have blood sugar >600 in cardiology clinic follow up today and was referred to outside ED.    DM II with neuropathy, uncontrolled  Managed on metformin alone, no recent A1C, does not restrict carbohydrate intake and does not follow blood sugars at home.  Glucose at outside ED was 769 and received 10 units Novolog with POC glucose upon arrival of 542.  - give aspart 15 units x1 now  - hold metformin  - start prandial insulin 1 unit/15 g carb tid with meals and high dose ssi    STORM on CKD stage III  Baseline Cr 0.9-1.0, Cr in clinic earlier 1/9 was 1.33.  Suspect pre-renal azotemia due to diuresis with hyperglycemia.  - hold PTA lasix and lisinopril  - check digoxin level in AM  - admission Cr 1.2, will give  ml and repeat in AM    CAD s/p CABG x3 in 1995 and PCI to LAD in 3/2018  HFrEF (EF 35-40%, mildly decreased RV systolic function 10/2018)  A-fib on coumadin  HTN  HLD  - continue PTA aspirin, atorvastatin, digoxin, metoprolol  - hold lisinopril and lasix  - PharmD to dose coumadin    PVD s/p left iliac artery stenting and fem-pop bypass, right BKA  - aspirin and statin as above       Diet: Moderate Consistent CHO Diet    DVT Prophylaxis: Warfarin  Dugan Catheter: not present  Code Status: Full Code      Disposition Plan   Expected discharge: 2 - 3 days, recommended to prior living arrangement once blood sugar adequately controlled.  Entered: Gaurav Mcfarlane MD 01/09/2020, 10:55 PM     The patient's care was discussed with the Patient.    Gaurav Mcfarlane MD  [facility]  Hospital    ______________________________________________________________________    Chief Complaint   Hyperglycemia    History is obtained from the patient and chart review    History of Present Illness   Tad Manning is a 79 year old male who presents as a direct admission from outside ED after he was referred there from Cardiology clinic when he was found to have blood sugar >600.  He reports feeling otherwise well with no specific complaints. He has noted increased urination and thirst recently in addition to mild orthostatic lightheadedness for the past 2-3 weeks but feels he has done well with oral hydration.  He reports he does not restrict his carbohydrate intake and does not monitor his blood sugars at home (has not checked in at least 3 weeks).  He did report a very brief episode of dyspnea today while sitting on the commode today but this has resolved.  Denies any chest pain/pressure, palpitations.  Denies any recent infectious symptoms.      Review of Systems    The 10 point Review of Systems is negative other than noted in the HPI or here.     Past Medical History    CKD stage III  Peripheral vascular disease status post left iliac stenting and femoropopliteal bypass  Right BKA  Hypertension  Hyperlipidemia  Diabetes mellitus type 2 with neuropathy  CAD status post CABG times 3/19/1995 and PCI to the LAD in March 2018  CLL, in remission  BPH  GERD  Biventricular systolic congestive heart failure  Atrial fibrillation on Coumadin    Past Surgical History   I have reviewed this patient's surgical history and updated it with pertinent information if needed.  Past Surgical History:   Procedure Laterality Date     AMPUTATE LEG BELOW KNEE Right 10/8/2018    Procedure: AMPUTATE LEG BELOW KNEE;  OPEN RIGHT LOWER LEG AMPUTATION WITH WOUND VAC PLACEMENT    EBL: 50mL;  Surgeon: Amador Vick MD;  Location: [facility] OR     AMPUTATE REVISION STUMP LOWER EXTREMITY Right 10/11/2018    Procedure: AMPUTATE  REVISION STUMP LOWER EXTREMITY;  CLOSURE RIGHT BELOW KNEE AMPUTATION;  Surgeon: Amador Vick MD;  Location:  OR     AMPUTATE TOE(S)  1/10/2014    Procedure: AMPUTATE TOE(S);;  Surgeon: Amador Vick MD;  Location:  OR     APPLY WOUND VAC Right 10/8/2018    Procedure: APPLY WOUND VAC;;  Surgeon: Amador Vick MD;  Location:  OR     BONE MARROW BIOPSY, BONE SPECIMEN, NEEDLE/TROCAR  10/19/2012    Procedure: BIOPSY BONE MARROW;  BONE MARROW BIOPSY  (CONSCIOUS SED);  Surgeon: Ramon Quesada MD;  Location:  GI     BYPASS GRAFT FEMOROPOPLITEAL  1/10/2014    Procedure: BYPASS GRAFT FEMOROPOPLITEAL;  Left above knee popliteal to  Below knee popliteal bypass using transverse saphenous vein graft. Left 2nd Toe amputation;  Surgeon: Amador Vick MD;  Location:  OR     CARDIAC SURGERY      angioplasty with stent, triple bypass     EXCISE CYST GENERIC (LOCATION) N/A 2/1/2016    Procedure: EXCISE CYST GENERIC (LOCATION);  Surgeon: Amador Vick MD;  Location:  [facility]     GRAFT VEIN FROM EXTREMITY (LOCATION)  1/10/2014    Procedure: GRAFT VEIN FROM EXTREMITY (LOCATION);;  Surgeon: Amador Vick MD;  Location:  OR     LAMINECTOMY THORACIC ONE LEVEL N/A 2/8/2017    Procedure: LAMINECTOMY THORACIC ONE LEVEL;  Surgeon: Marcelo Trent MD;  Location: [facility] OR     OPTICAL TRACKING SYSTEM FUSION POSTERIOR SPINE THORACIC THREE+ LEVELS N/A 2/12/2017    Procedure: OPTICAL TRACKING SYSTEM FUSION POSTERIOR SPINE THORACIC THREE+ LEVELS;  Surgeon: Marcelo Trent MD;  Location:  OR     TONSILLECTOMY       VASCULAR SURGERY      ptcr legs       Social History   Reports a 60-pack-year history of tobacco abuse, having quit 25 years ago.  He denies any alcohol use.  He resides with a roommate and ambulates with a walker.    Family History   Mother with leukemia.    Prior to Admission Medications   Prior to Admission Medications   Prescriptions Last Dose Informant Patient Reported? Taking?    PROVIDER DOSED MANAGED WARFARIN, COUMADIN, OUTPATIENT  Self No No   Sig: Dose based on INR per Primary MD/Nursing Home MD.   aspirin (ASA) 81 MG tablet 1/8/2020 at pm Self No Yes   Sig: Take 1 tablet (81 mg) by mouth daily   atorvastatin (LIPITOR) 20 MG tablet 1/8/2020 at pm Self No Yes   Sig: Take 1 tablet (20 mg) by mouth daily   cholecalciferol 1000 UNITS TABS 1/9/2020 at am Self No Yes   Sig: Take 1,000 Units by mouth daily   citalopram (CELEXA) 20 MG tablet 1/8/2020 at pm Self Yes Yes   Sig: Take 20 mg by mouth every evening   digoxin (LANOXIN) 125 MCG tablet 1/9/2020 at am Self Yes Yes   Sig: Take 1 tablet (125 mcg) by mouth daily   ferrous sulfate (IRON) 325 (65 Fe) MG tablet 1/9/2020 at am Self No Yes   Sig: Take 1 tablet (325 mg) by mouth daily   furosemide (LASIX) 20 MG tablet Past Week at am Self No Yes   Sig: Take 1 tablet (20 mg) by mouth daily   hydrocortisone (CORTIZONE-10) 1 % ointment  at prn Self Yes Yes   Sig: Apply topically 2 times daily as needed for itching   lisinopril (PRINIVIL/ZESTRIL) 5 MG tablet 1/8/2020 at pm Self No Yes   Sig: Take 1 tablet (5 mg) by mouth daily   melatonin 5 MG tablet 1/8/2020 at hs Self Yes Yes   Sig: Take 5 mg by mouth At Bedtime   metFORMIN (GLUCOPHAGE-XR) 750 MG 24 hr tablet 1/9/2020 at am Self Yes Yes   Sig: Take 750 mg by mouth 2 times daily (with meals)   metoprolol succinate ER (TOPROL-XL) 100 MG 24 hr tablet Past Week at Unknown time Self No Yes   Sig: Take 1 tablet (100 mg) by mouth daily   nitroglycerin (NITROSTAT) 0.4 MG sublingual tablet  at prn Self No Yes   Sig: For chest pain place 1 tablet under the tongue every 5 minutes for 3 doses. If symptoms persist 5 minutes after 1st dose call 911.   nystatin (MYCOSTATIN) [id number] UNIT/GM external cream  at prn Self Yes Yes   Sig: Apply topically daily as needed for dry skin    order for DME  Self No No   Sig: Equipment being ordered: [facility] shoe   oxyCODONE IR (ROXICODONE) 5 MG tablet  at prn Self No Yes   Sig:  Take 1-2 tablets (5-10 mg) by mouth every 4 hours as needed for pain   pantoprazole (PROTONIX) 40 MG EC tablet 2020 at am Self Yes Yes   Sig: Take 40 mg by mouth daily   polyethylene glycol (MIRALAX/GLYCOLAX) Packet  at prn Self Yes Yes   Sig: Take 1 packet by mouth daily as needed    sodium chloride (EQ SALINE NASAL SPRAY) 0.65 % nasal spray  at prn Self No Yes   Sig: Spray 1 spray into both nostrils daily as needed for congestion   traZODone (DESYREL) 50 MG tablet 2020 at hs Self Yes Yes   Sig: Take 150 mg by mouth At Bedtime   warfarin ANTICOAGULANT (COUMADIN) 5 MG tablet 2020 at pm Self Yes Yes   Si.5 mg M/F  5 mg AOD      Facility-Administered Medications: None     Allergies   Allergies   Allergen Reactions     Blood Transfusion Related (Informational Only) Other (See Comments)     Patient has a history of a clinically significant antibody against RBC antigens.  A delay in compatible RBCs may occur.        Physical Exam   Vital Signs: Temp: 98.4  F (36.9  C) Temp src: Oral BP: (!) 147/72 Pulse: 81   Resp: 20 SpO2: 99 % O2 Device: None (Room air)    Weight: 0 lbs 0 oz    General Appearance: Well-nourished male in no acute distress  Eyes: Pupils equal, round reactive to light, sclera anicteric  HEENT: Mucous membranes moist, no neck lymphadenopathy  Respiratory: Lungs clear to auscultation bilaterally, no wheezes or crackles, no tachypnea  Cardiovascular: Regular rhythm, normal S1/S2, no murmurs, rubs or gallops  GI: Abdomen soft, nontender, nondistended, normal bowel sounds  Lymph/Hematologic: No peripheral edema  Skin: No rash or bruising  Musculoskeletal: Right BKA, extremities otherwise warm and well-perfused  Neurologic: Alert and appropriate, cranial nerves grossly intact  Psychiatric: Normal affect    Data   Data reviewed today: I reviewed all medications, new labs and imaging results over the last 24 hours. I personally reviewed no images or EKG's today.

## 2020-01-10 NOTE — PHARMACY-ADMISSION MEDICATION HISTORY
Pharmacy Medication History  Admission medication history interview status for the 2020  admission is complete. See EPIC admission navigator for prior to admission medications     Medication history sources: Spoke w/ patient.  Reviewed recent fill history in [employer].    Medication history source reliability: Moderate  Adherence assessment: Moderate    Additional medication history information:   Warfarin dosin.5 mg PO Monday and Friday       5 mg PO all other days         Last Warfarin dose (5 mg) on 2020 in the evening.      Medication reconciliation completed by provider prior to medication history? No    Time spent in this activity: 25 minutes      Prior to Admission medications    Medication Sig Last Dose Taking? Auth Provider   aspirin (ASA) 81 MG tablet Take 1 tablet (81 mg) by mouth daily 2020 at pm Yes Clarisse Valdes MD   atorvastatin (LIPITOR) 20 MG tablet Take 1 tablet (20 mg) by mouth daily 2020 at pm Yes Clarisse Valdes MD   cholecalciferol 1000 UNITS TABS Take 1,000 Units by mouth daily 2020 at am Yes Derek Navas DDS   citalopram (CELEXA) 20 MG tablet Take 20 mg by mouth every evening 2020 at pm Yes Unknown, Entered By History   digoxin (LANOXIN) 125 MCG tablet Take 1 tablet (125 mcg) by mouth daily 2020 at am Yes Clarisse Valdes MD   ferrous sulfate (IRON) 325 (65 Fe) MG tablet Take 1 tablet (325 mg) by mouth daily 2020 at am Yes Abdiel Alejandro MD   furosemide (LASIX) 20 MG tablet Take 1 tablet (20 mg) by mouth daily Past Week at am Yes Michelle Sabillon APRN CNP   hydrocortisone (CORTIZONE-10) 1 % ointment Apply topically 2 times daily as needed for itching  at prn Yes Unknown, Entered By History   lisinopril (PRINIVIL/ZESTRIL) 5 MG tablet Take 1 tablet (5 mg) by mouth daily 2020 at pm Yes Clarisse Valdes MD   melatonin 5 MG tablet Take 5 mg by mouth At Bedtime 2020 at hs Yes Unknown, Entered By History   metFORMIN (GLUCOPHAGE-XR) 750 MG  24 hr tablet Take 750 mg by mouth 2 times daily (with meals) 1/9/2020 at am Yes Unknown, Entered By History   metoprolol succinate ER (TOPROL-XL) 100 MG 24 hr tablet Take 1 tablet (100 mg) by mouth daily Past Week at Unknown time Yes Michelle Sabillon APRN CNP   nitroglycerin (NITROSTAT) 0.4 MG sublingual tablet For chest pain place 1 tablet under the tongue every 5 minutes for 3 doses. If symptoms persist 5 minutes after 1st dose call 911.  at prn Yes Derek Navas DDS   nystatin (MYCOSTATIN) 390447 UNIT/GM external cream Apply topically daily as needed for dry skin   at prn Yes Reported, Patient   oxyCODONE IR (ROXICODONE) 5 MG tablet Take 1-2 tablets (5-10 mg) by mouth every 4 hours as needed for pain  at prn Yes Abdiel Alejandro MD   pantoprazole (PROTONIX) 40 MG EC tablet Take 40 mg by mouth daily 1/9/2020 at am Yes Unknown, Entered By History   polyethylene glycol (MIRALAX/GLYCOLAX) Packet Take 1 packet by mouth daily as needed   at prn Yes Reported, Patient   sodium chloride (EQ SALINE NASAL SPRAY) 0.65 % nasal spray Spray 1 spray into both nostrils daily as needed for congestion  at prn Yes Clarisse Valdes MD   traZODone (DESYREL) 50 MG tablet Take 150 mg by mouth At Bedtime 1/8/2020 at hs Yes Unknown, Entered By History   warfarin ANTICOAGULANT (COUMADIN) 5 MG tablet 7.5 mg M/F  5 mg AOD 1/8/2020 at pm Yes Unknown, Entered By History   order for DME Equipment being ordered: DH2 Enzo Amse, DPM   PROVIDER DOSED MANAGED WARFARIN, COUMADIN, OUTPATIENT Dose based on INR per Primary MD/Nursing Home MD.   Abdiel Alejandro MD Carolyn Dahlman, PharmD, BCPS

## 2020-01-10 NOTE — PROGRESS NOTES
CLINICAL NUTRITION SERVICES  -  ASSESSMENT NOTE      Recommendations Ordered by Registered Dietitian (RD):   Continue current diet order  Recommend outpatient referral for CDE   MALNUTRITION:  % Weight Loss:  Weight loss does not meet criteria for malnutrition   % Intake:  No decreased intake noted  Subcutaneous Fat Loss: Upper arm region mild depletion, not using as an indictor given one   Muscle Loss:  Temporal region mild depletion, Clavicle bone region mild depletion, Acromion bone region mild depletion, Patellar region mild depletion, Anterior thigh region mild depletion and Posterior calf region mild depletion  Fluid Retention:  None noted    Malnutrition Diagnosis: Patient does not meet two of the above criteria necessary for diagnosing malnutrition          REASON FOR ASSESSMENT  Tad Manning is a 79 year old male seen by Registered Dietitian for Admission Nutrition Risk Screen for unintentional loss of 10# or more in the past two months, reduced oral intake over the last month, stageable pressure injuries or large/non-healing wound or burn and new/uncontrolled diabetes    Past history of ischemic cardiomyopathy, HTN, hyperlipidemia, A-fib, CAD s/p CABG and PCI, PVD s/p stenting and fem-pop bypass and BKA, DM II with neuropathy among other chronic medical issues who presents after being found to have blood sugar >600.    NUTRITION HISTORY  Information obtained from chart:  - Does not restrict carbohydrate intake and does not follow blood sugars at home  Information obtained from pt:  - Last saw a dietitian for diabetes nutrition education 4 or 5 years ago  - Good appetite, was eating plenty so was concerned when he found out he had lost weight   - Breakfast: Dariusz Sandoval sausage sandwich and fruit  - Lunch: Meals on Wheels 3 times a week, will eat out or eat left overs from dinner on other days  -Dinner: roommate will cook for him, hot dish, jumbo, or beef stew, he will also do [employer] meals   -  "Reports eating out 1-2 times a month to 2 times per week      CURRENT NUTRITION ORDERS  Diet Order:     Moderate Consistent Carbohydrate       Current Intake/Tolerance:  Information obtained from chart:  - consumed 100% of breakfast 1/10  Information obtained from pt:  - was just ordering lunch right before being seen      NUTRITION FOCUSED PHYSICAL ASSESSMENT FOR DIAGNOSING MALNUTRITION)  Yes                 Observed:    Muscle wasting (refer to documentation in Malnutrition section) and Subcutaneous fat loss (refer to documentation in Malnutrition section)    Obtained from Chart/Interdisciplinary Team:  Suspected pressure injury on butt, groin, and backside      ANTHROPOMETRICS  Height: 5' 10\"  Weight: 164 lbs 3.88 oz (74.5 kg)  Body mass index is 24.9 kg/m , considering BKA  Weight Status:  Normal BMI  IBW: 166 lbs  % IBW: 99%  Weight History:  Wt Readings from Last 10 Encounters:   01/10/20 74.5 kg (164 lb 3.9 oz)   01/09/20 76.2 kg (167 lb 14.4 oz)   08/12/19 77.6 kg (171 lb)   06/20/19 83.9 kg (185 lb)   04/01/19 84.3 kg (185 lb 12.8 oz)   03/14/19 82.1 kg (181 lb)   01/14/19 82.5 kg (181 lb 12.8 oz)   12/13/18 79.4 kg (175 lb)   11/07/18 77.6 kg (171 lb 1.6 oz)   11/05/18 77.5 kg (170 lb 14.4 oz)   4% wt loss in 5 months, may be due to uncontrolled blood sugars   Reports a normal weight of 175-180 lbs     LABS  Labs reviewed  Hemoglobin A1C   Date Value Ref Range Status   01/09/2020 14.1 (H) 0 - 5.6 % Final     Comment:     Normal <5.7% Prediabetes 5.7-6.4%  Diabetes 6.5% or higher - adopted from ADA   consensus guidelines.       MEDICATIONS  Medications reviewed  NovoLOG, 1-10 units, Tid before meals  NovoLOG, 1-7 units bed time  NovoLOG, every morning before breakfast  Prior to admission managed on metformin alone      ASSESSED NUTRITION NEEDS PER APPROVED PRACTICE GUIDELINES:    Dosing Weight 74.5 kg  Estimated Energy Needs: 1863 - 2235 kcals (25-30 Kcal/Kg)  Justification: maintenance  Estimated Protein " Needs: 75 - 90 grams protein (1-1.2 g pro/Kg)  Justification: maintenance and preservation of lean body mass  Estimated Fluid Needs: (1 mL/Kcal)  Justification: maintenance and per provider pending fluid status    MALNUTRITION:  % Weight Loss:  Weight loss does not meet criteria for malnutrition   % Intake:  No decreased intake noted  Subcutaneous Fat Loss: Upper arm region mild depletion, not using as an indictor given one   Muscle Loss:  Temporal region mild depletion, Clavicle bone region mild depletion, Acromion bone region mild depletion, Patellar region mild depletion, Anterior thigh region mild depletion and Posterior calf region mild depletion  Fluid Retention:  None noted    Malnutrition Diagnosis: Patient does not meet two of the above criteria necessary for diagnosing malnutrition        NUTRITION DIAGNOSIS:  Altered nutrition-related lab value (A1C) related to nutrition related knowledge deficit as evidenced by a A1C of 14 and not monitoring blood sugars and carb intake           NUTRITION INTERVENTIONS  Recommendations / Nutrition Prescription  Continue current diet order  Recommend outpatient referral for CDE      Implementation  Nutrition education: No education needs assessed at this time          Nutrition Goals  Consume >/= 75% of meals TID        MONITORING AND EVALUATION:  Progress towards goals will be monitored and evaluated per protocol and Practice Guidelines      Adelaida Polanco  Dietetic Intern

## 2020-01-10 NOTE — PROGRESS NOTES
Report taken from Tori @ [facility] ED. Patient being admit for high blood sugars >500. Last Sugar taken within the hour. HR 75, /61 98% RA. Expect patient arrival in one hour.

## 2020-01-10 NOTE — PLAN OF CARE
Vital signs stable. Tele in SR with BBB & PVC's. Had lesvia episodes with junctional beats. See chart for tele strips. Room air. A&Ox4. Wallet and keys sent to security. Blood glucose elevated to >500. 15 units of Novelog given. Pressure soar to butt, red groin and backside, with scab on L butt cheek. Coccyx non blanchable. Non blanchable soars to R stump and posterior leg. L toe with one toe amputated as well. Patient voids & had bowel movement on bedside commode with adequate urine output. Frequency and urgency noted. Pt denies pain. Up with 1 assist to bedside commode. Aggression color green. Call light within reach. High fall risk. Bed alarm active.

## 2020-01-10 NOTE — PHARMACY-ANTICOAGULATION SERVICE
Clinical Pharmacy - Warfarin Dosing Consult     Pharmacy has been consulted to manage this patient s warfarin therapy.  Indication: Atrial Fibrillation  Therapy Goal: INR 2-3  Warfarin Prior to Admission: Yes  Warfarin PTA Regimen: 7.5 mg M/F; 5 mg all other days  Significant drug interactions: aspirin    INR   Date Value Ref Range Status   01/09/2020 1.95 (H) 0.86 - 1.14 Final     INR Point of Care   Date Value Ref Range Status   01/06/2020 1.9 (H) 0.86 - 1.14 Final     Comment:     This test is intended for monitoring Coumadin therapy.  Results are not   accurate in patients with prolonged INR due to factor deficiency.         Recommend warfarin 5 mg today.  Pharmacy will monitor Tad Manning daily and order warfarin doses to achieve specified goal.      Please contact pharmacy as soon as possible if the warfarin needs to be held for a procedure or if the warfarin goals change.

## 2020-01-11 LAB
GLUCOSE BLDC GLUCOMTR-MCNC: 225 MG/DL (ref 70–99)
GLUCOSE BLDC GLUCOMTR-MCNC: 259 MG/DL (ref 70–99)
GLUCOSE BLDC GLUCOMTR-MCNC: 276 MG/DL (ref 70–99)
GLUCOSE BLDC GLUCOMTR-MCNC: 302 MG/DL (ref 70–99)
GLUCOSE BLDC GLUCOMTR-MCNC: 334 MG/DL (ref 70–99)
GLUCOSE BLDC GLUCOMTR-MCNC: 375 MG/DL (ref 70–99)
INR PPP: 1.94 (ref 0.86–1.14)

## 2020-01-11 PROCEDURE — 00000146 ZZHCL STATISTIC GLUCOSE BY METER IP

## 2020-01-11 PROCEDURE — 85610 PROTHROMBIN TIME: CPT | Performed by: STUDENT IN AN ORGANIZED HEALTH CARE EDUCATION/TRAINING PROGRAM

## 2020-01-11 PROCEDURE — 25000132 ZZH RX MED GY IP 250 OP 250 PS 637: Mod: GY | Performed by: STUDENT IN AN ORGANIZED HEALTH CARE EDUCATION/TRAINING PROGRAM

## 2020-01-11 PROCEDURE — 25000132 ZZH RX MED GY IP 250 OP 250 PS 637: Mod: GY | Performed by: HOSPITALIST

## 2020-01-11 PROCEDURE — 12000000 ZZH R&B MED SURG/OB

## 2020-01-11 PROCEDURE — 25000131 ZZH RX MED GY IP 250 OP 636 PS 637: Mod: GY | Performed by: STUDENT IN AN ORGANIZED HEALTH CARE EDUCATION/TRAINING PROGRAM

## 2020-01-11 PROCEDURE — 99233 SBSQ HOSP IP/OBS HIGH 50: CPT | Performed by: STUDENT IN AN ORGANIZED HEALTH CARE EDUCATION/TRAINING PROGRAM

## 2020-01-11 PROCEDURE — 99207 ZZC CDG-MDM COMPONENT: MEETS MODERATE - UP CODED: CPT | Performed by: STUDENT IN AN ORGANIZED HEALTH CARE EDUCATION/TRAINING PROGRAM

## 2020-01-11 PROCEDURE — 36415 COLL VENOUS BLD VENIPUNCTURE: CPT | Performed by: STUDENT IN AN ORGANIZED HEALTH CARE EDUCATION/TRAINING PROGRAM

## 2020-01-11 RX ORDER — WARFARIN SODIUM 3 MG/1
6 TABLET ORAL
Status: COMPLETED | OUTPATIENT
Start: 2020-01-11 | End: 2020-01-11

## 2020-01-11 RX ORDER — LANCETS
EACH MISCELLANEOUS
Qty: 100 EACH | Refills: 1 | Status: SHIPPED | OUTPATIENT
Start: 2020-01-11 | End: 2020-01-11

## 2020-01-11 RX ORDER — GLUCOSAMINE HCL/CHONDROITIN SU 500-400 MG
CAPSULE ORAL
Qty: 100 EACH | Refills: 3 | Status: SHIPPED | OUTPATIENT
Start: 2020-01-11

## 2020-01-11 RX ORDER — GLUCOSAMINE HCL/CHONDROITIN SU 500-400 MG
CAPSULE ORAL
Qty: 100 EACH | Refills: 3 | Status: SHIPPED | OUTPATIENT
Start: 2020-01-11 | End: 2020-01-11

## 2020-01-11 RX ORDER — LANCETS
1 EACH MISCELLANEOUS 4 TIMES DAILY
Qty: 100 EACH | Refills: 1 | Status: SHIPPED | OUTPATIENT
Start: 2020-01-11 | End: 2020-01-12

## 2020-01-11 RX ADMIN — INSULIN HUMAN 7 UNITS: 100 INJECTION, SUSPENSION SUBCUTANEOUS at 15:59

## 2020-01-11 RX ADMIN — NYSTATIN: 100000 CREAM TOPICAL at 21:27

## 2020-01-11 RX ADMIN — DIGOXIN 125 MCG: 0.12 TABLET ORAL at 08:48

## 2020-01-11 RX ADMIN — WARFARIN SODIUM 6 MG: 3 TABLET ORAL at 17:10

## 2020-01-11 RX ADMIN — ATORVASTATIN CALCIUM 20 MG: 20 TABLET, FILM COATED ORAL at 21:26

## 2020-01-11 RX ADMIN — ASPIRIN 81 MG: 81 TABLET, COATED ORAL at 21:26

## 2020-01-11 RX ADMIN — CITALOPRAM HYDROBROMIDE 20 MG: 20 TABLET ORAL at 21:26

## 2020-01-11 RX ADMIN — TRAZODONE HYDROCHLORIDE 150 MG: 100 TABLET ORAL at 21:26

## 2020-01-11 RX ADMIN — PANTOPRAZOLE SODIUM 40 MG: 40 TABLET, DELAYED RELEASE ORAL at 08:48

## 2020-01-11 RX ADMIN — MELATONIN 5 MG TABLET 5 MG: at 21:26

## 2020-01-11 RX ADMIN — INSULIN GLARGINE 13 UNITS: 100 INJECTION, SOLUTION SUBCUTANEOUS at 21:27

## 2020-01-11 RX ADMIN — INSULIN ASPART 2 UNITS: 100 INJECTION, SOLUTION INTRAVENOUS; SUBCUTANEOUS at 21:27

## 2020-01-11 RX ADMIN — LISINOPRIL 5 MG: 5 TABLET ORAL at 08:49

## 2020-01-11 RX ADMIN — METOPROLOL SUCCINATE 100 MG: 100 TABLET, EXTENDED RELEASE ORAL at 08:48

## 2020-01-11 RX ADMIN — NYSTATIN: 100000 CREAM TOPICAL at 08:55

## 2020-01-11 RX ADMIN — FUROSEMIDE 20 MG: 20 TABLET ORAL at 08:48

## 2020-01-11 ASSESSMENT — ACTIVITIES OF DAILY LIVING (ADL)
ADLS_ACUITY_SCORE: 11

## 2020-01-11 ASSESSMENT — MIFFLIN-ST. JEOR: SCORE: 1452.25

## 2020-01-11 NOTE — PLAN OF CARE
A/Ox4,  VSS. Denies pain.  , Dr Benz notified,additional coverage provided. Ambulated to bathroom x 2 with walker and SBA. Turn/repos in bed. Mepilex to coccyx. Call light at hand no further needs noted.

## 2020-01-11 NOTE — PROGRESS NOTES
[facility]    Medicine Progress Note - Hospitalist Service       Date of Admission:  1/9/2020  Date of Service: 01/11/2020    Assessment & Plan      Tad Manning is a 79 year old male admitted on 1/9/2020.  Past history of ischemic cardiomyopathy, HTN, hyperlipidemia, A-fib, CAD s/p CABG and PCI, PVD s/p stenting and fem-pop bypass and BKA, DM II with neuropathy among other chronic medical issues who presents after being found to have blood sugar >600 in cardiology clinic follow up today and was referred to outside ED.    DM II with neuropathy, uncontrolled  Assessment: PTA his DM is managed on metformin alone, no recent A1C prior to admission, does not restrict carbohydrate intake and does not follow blood sugars at home. Glucose at outside ED was 769 and received 10 units Novolog with POC glucose upon arrival of 542. BG better after aspart 15 U on admission. Hgb A1c > 14.0, BG better, but still > 300, will need increase in lantus and more insulin education on how to administer.   Plan:  - Lantus increased to 20 U at bedtime, titrate as needed  - hold metformin  - Continue prandial insulin 1 unit/15 g carb tid with meals and high dose ssi while inpatient.   - PT/OT eval    STORM on CKD stage III  Baseline Cr 0.9-1.0, Cr in clinic earlier 1/9 was 1.33.  Suspect pre-renal azotemia due to diuresis with hyperglycemia. Cr now normalized with IVF  Plan:  - Resume PTA lasix and lisinopril in AM    CAD s/p CABG x3 in 1995 and PCI to LAD in 3/2018  HFrEF (EF 35-40%, mildly decreased RV systolic function 10/2018)  A-fib on coumadin  HTN  HLD  - continue PTA aspirin, atorvastatin, digoxin, metoprolol  - Resume lisinopril and lasix  - PharmD to dose coumadin    PVD s/p left iliac artery stenting and fem-pop bypass, right BKA  - aspirin and statin as above       Diet: Moderate Consistent CHO Diet  Diet    DVT Prophylaxis: Pneumatic Compression Devices  Dugan Catheter: not present  Code Status: Full Code       Disposition Plan   Expected discharge: Tomorrow, recommended to prior living arrangement once BG controlled.  Entered: Case Benz MD 01/11/2020, 2:44 PM       The patient's care was discussed with the Bedside Nurse and Patient.    Case Benz MD  Hospitalist Service  [facility]    ______________________________________________________________________    Interval History     No acute events since admission  No CP/SOB. BG still elevated  RN has concerns that patient not knowlegable enough today to self administer insulin.   No new complaints today.     Data reviewed today: I reviewed all medications, new labs and imaging results over the last 24 hours. I personally reviewed no images or EKG's today.    Physical Exam   Vital Signs: Temp: 98  F (36.7  C) Temp src: Axillary BP: 125/61 Pulse: 61 Heart Rate: 61 Resp: 18 SpO2: 97 % O2 Device: None (Room air)    Weight: 158 lbs 15.23 oz    Constitutional: awake, alert, cooperative, no apparent distress.   Respiratory: CTABL   Cardiovascular: RRR with no m/r/g   GI: Normal bowel sounds, soft, non-distended, non-tender. Skin: normal skin color, texture, turgor   Musculoskeletal: There is no redness, warmth, or swelling of the joints. Full range of motion noted.   Neurologic: Awake, alert, oriented to name, place and time. Motor is 5 out of 5 bilaterally. Sensory is intact.   Neuropsychiatric: normal mood and affect    Data   Recent Labs   Lab 01/11/20  0859 01/10/20  0537 01/09/20  2313 01/09/20  1038   WBC  --   --  13.2*  --    HGB  --   --  12.0*  --    MCV  --   --  90  --    PLT  --   --  101*  --    INR 1.94* 2.02* 1.95*  --    NA  --  133 129* 126*   POTASSIUM  --  3.6 4.4 4.6   CHLORIDE  --  101 95 92*   CO2  --  27 28 25   BUN  --  29 30 32*   CR  --  1.11 1.24 1.33*   ANIONGAP  --  5 6 9   JUSTINO  --  9.3 9.8 9.7   GLC  --  262* 537* 660*     No results found for this or any previous visit (from the past 24 hour(s)).  Medications     - MEDICATION  INSTRUCTIONS -       Warfarin Therapy Reminder         aspirin  81 mg Oral QPM     atorvastatin  20 mg Oral At Bedtime     citalopram  20 mg Oral QPM     digoxin  125 mcg Oral Daily     furosemide  20 mg Oral Daily     insulin aspart   Subcutaneous TID w/meals     insulin aspart  1-10 Units Subcutaneous TID AC     insulin aspart  1-7 Units Subcutaneous At Bedtime     insulin glargine  13 Units Subcutaneous At Bedtime     insulin isophane human  7 Units Subcutaneous Once     lisinopril  5 mg Oral Daily     melatonin  5 mg Oral At Bedtime     metoprolol succinate ER  100 mg Oral Daily     nystatin   Topical BID     pantoprazole  40 mg Oral Daily     sodium chloride (PF)  3 mL Intracatheter Q8H     traZODone  150 mg Oral At Bedtime     warfarin ANTICOAGULANT  6 mg Oral ONCE at 18:00

## 2020-01-11 NOTE — PLAN OF CARE
DATE & TIME: 01/20 from 1900 to 0730    Cognitive Concerns/ Orientation : A&O x 4   BEHAVIOR & AGGRESSION TOOL COLOR: Green  CIWA SCORE: N/A   ABNL VS/O2: VSS on RA except hypertensive ( 148/72)  MOBILITY: Up x SBA to bathroom, RBKA-uses prosthesis., able to turn and reposition independently, requires reminders and encouragement.   PAIN MANAGMENT: Denied  DIET: Mod CHO diet with good appetite  BOWEL/BLADDER: Incontinent at times, has frequency at times, uses bathroom most of the time.   ABNL LAB/BG: WBC 13.2, Platelet 101, INR 2.02,  & 259  DRAIN/DEVICES: PIV saline lock  TELEMETRY RHYTHM: N/A  SKIN: Blanchable redness on buttock, Meplex dressing in placed, CDI.   TESTS/PROCEDURES: N/A  D/C DAY/GOALS/PLACE: Possible discharge to home tomorrow  OTHER IMPORTANT INFO:

## 2020-01-12 ENCOUNTER — APPOINTMENT (OUTPATIENT)
Dept: PHYSICAL THERAPY | Facility: CLINIC | Age: 80
DRG: 638 | End: 2020-01-12
Attending: STUDENT IN AN ORGANIZED HEALTH CARE EDUCATION/TRAINING PROGRAM
Payer: MEDICARE

## 2020-01-12 LAB
GLUCOSE BLDC GLUCOMTR-MCNC: 266 MG/DL (ref 70–99)
GLUCOSE BLDC GLUCOMTR-MCNC: 267 MG/DL (ref 70–99)
GLUCOSE BLDC GLUCOMTR-MCNC: 273 MG/DL (ref 70–99)
GLUCOSE BLDC GLUCOMTR-MCNC: 283 MG/DL (ref 70–99)
GLUCOSE BLDC GLUCOMTR-MCNC: 306 MG/DL (ref 70–99)
GLUCOSE BLDC GLUCOMTR-MCNC: 358 MG/DL (ref 70–99)
INR PPP: 2 (ref 0.86–1.14)

## 2020-01-12 PROCEDURE — 25000131 ZZH RX MED GY IP 250 OP 636 PS 637: Mod: GY | Performed by: INTERNAL MEDICINE

## 2020-01-12 PROCEDURE — 25000132 ZZH RX MED GY IP 250 OP 250 PS 637: Mod: GY | Performed by: INTERNAL MEDICINE

## 2020-01-12 PROCEDURE — 25000132 ZZH RX MED GY IP 250 OP 250 PS 637: Mod: GY | Performed by: STUDENT IN AN ORGANIZED HEALTH CARE EDUCATION/TRAINING PROGRAM

## 2020-01-12 PROCEDURE — 97530 THERAPEUTIC ACTIVITIES: CPT | Mod: GP

## 2020-01-12 PROCEDURE — 97161 PT EVAL LOW COMPLEX 20 MIN: CPT | Mod: GP

## 2020-01-12 PROCEDURE — 97116 GAIT TRAINING THERAPY: CPT | Mod: GP

## 2020-01-12 PROCEDURE — 99233 SBSQ HOSP IP/OBS HIGH 50: CPT | Performed by: INTERNAL MEDICINE

## 2020-01-12 PROCEDURE — 25000132 ZZH RX MED GY IP 250 OP 250 PS 637: Mod: GY | Performed by: HOSPITALIST

## 2020-01-12 PROCEDURE — 36415 COLL VENOUS BLD VENIPUNCTURE: CPT | Performed by: STUDENT IN AN ORGANIZED HEALTH CARE EDUCATION/TRAINING PROGRAM

## 2020-01-12 PROCEDURE — 12000000 ZZH R&B MED SURG/OB

## 2020-01-12 PROCEDURE — 00000146 ZZHCL STATISTIC GLUCOSE BY METER IP

## 2020-01-12 PROCEDURE — 85610 PROTHROMBIN TIME: CPT | Performed by: STUDENT IN AN ORGANIZED HEALTH CARE EDUCATION/TRAINING PROGRAM

## 2020-01-12 RX ORDER — LANCETS
1 EACH MISCELLANEOUS 4 TIMES DAILY
Qty: 100 EACH | Refills: 1 | Status: SHIPPED | OUTPATIENT
Start: 2020-01-12

## 2020-01-12 RX ORDER — METFORMIN HYDROCHLORIDE 750 MG/1
750 TABLET, EXTENDED RELEASE ORAL 2 TIMES DAILY WITH MEALS
Status: DISCONTINUED | OUTPATIENT
Start: 2020-01-12 | End: 2020-01-13 | Stop reason: HOSPADM

## 2020-01-12 RX ORDER — WARFARIN SODIUM 5 MG/1
5 TABLET ORAL
Status: COMPLETED | OUTPATIENT
Start: 2020-01-12 | End: 2020-01-12

## 2020-01-12 RX ADMIN — METFORMIN HYDROCHLORIDE 750 MG: 750 TABLET, EXTENDED RELEASE ORAL at 20:46

## 2020-01-12 RX ADMIN — METOPROLOL SUCCINATE 100 MG: 100 TABLET, EXTENDED RELEASE ORAL at 08:36

## 2020-01-12 RX ADMIN — ATORVASTATIN CALCIUM 20 MG: 20 TABLET, FILM COATED ORAL at 21:23

## 2020-01-12 RX ADMIN — TRAZODONE HYDROCHLORIDE 150 MG: 100 TABLET ORAL at 21:23

## 2020-01-12 RX ADMIN — LISINOPRIL 5 MG: 5 TABLET ORAL at 08:36

## 2020-01-12 RX ADMIN — MELATONIN 5 MG TABLET 5 MG: at 21:23

## 2020-01-12 RX ADMIN — ASPIRIN 81 MG: 81 TABLET, COATED ORAL at 20:46

## 2020-01-12 RX ADMIN — FUROSEMIDE 20 MG: 20 TABLET ORAL at 08:36

## 2020-01-12 RX ADMIN — NYSTATIN: 100000 CREAM TOPICAL at 21:24

## 2020-01-12 RX ADMIN — WARFARIN SODIUM 5 MG: 5 TABLET ORAL at 17:13

## 2020-01-12 RX ADMIN — METFORMIN HYDROCHLORIDE 750 MG: 750 TABLET, EXTENDED RELEASE ORAL at 13:32

## 2020-01-12 RX ADMIN — INSULIN GLARGINE 20 UNITS: 100 INJECTION, SOLUTION SUBCUTANEOUS at 21:23

## 2020-01-12 RX ADMIN — DIGOXIN 125 MCG: 0.12 TABLET ORAL at 08:36

## 2020-01-12 RX ADMIN — PANTOPRAZOLE SODIUM 40 MG: 40 TABLET, DELAYED RELEASE ORAL at 08:36

## 2020-01-12 RX ADMIN — INSULIN ASPART 7 UNITS: 100 INJECTION, SOLUTION INTRAVENOUS; SUBCUTANEOUS at 21:24

## 2020-01-12 RX ADMIN — CITALOPRAM HYDROBROMIDE 20 MG: 20 TABLET ORAL at 20:46

## 2020-01-12 RX ADMIN — NYSTATIN: 100000 CREAM TOPICAL at 08:37

## 2020-01-12 ASSESSMENT — ACTIVITIES OF DAILY LIVING (ADL)
ADLS_ACUITY_SCORE: 16
ADLS_ACUITY_SCORE: 11
ADLS_ACUITY_SCORE: 15
ADLS_ACUITY_SCORE: 16
ADLS_ACUITY_SCORE: 15
ADLS_ACUITY_SCORE: 16

## 2020-01-12 ASSESSMENT — MIFFLIN-ST. JEOR: SCORE: 1440.25

## 2020-01-12 NOTE — PLAN OF CARE
DATE & TIME: 1/11 from 1900 to 0730    Cognitive Concerns/ Orientation : A&O x 4   BEHAVIOR & AGGRESSION TOOL COLOR: Green  CIWA SCORE: N/A   ABNL VS/O2: VSS on RA  MOBILITY: Up x SBA to bathroom, at times uses urinal  PAIN MANAGMENT: Green  DIET: Mod CHO with good appitite  BOWEL/BLADDER: Continent, voiding well. Has urinary frequency baseline per pt report  ABNL LAB/BG: INR 1.94, WBC 13.2, Hgb 12.0, platelet 101, Hgb A1C 14.1,  and 283  DRAIN/DEVICES: PIV saline lock  TELEMETRY RHYTHM: N/A  SKIN: Blanchable redness on buttock, Meplex dressing in placed, CDI. Rash to perineal area. Able to turn and reposition self, requires reminders. Otherwise skin Intact   TESTS/PROCEDURES: N/A  D/C DAY/GOALS/PLACE: Possible discharge to home tomorrow  OTHER IMPORTANT INFO: Insulin administration teaching done. Still needs more teaching to be done and reinforcement.

## 2020-01-12 NOTE — PLAN OF CARE
Discharge Planner PT   Patient plan for discharge: Home  Current status: Pt is a 79 year old male with history of right BKA admitted with high blood sugar. At baseline, pt lives in an accessible apartment with a friend. Pt uses a FWW for ambulation, otherwise, independent with mobility. Pt receives meals on wheels and roommate assists with shopping.  This date, pt is independent with bed mobility. Pt is able to independently don his prosthesis while sitting at EOB. Pt progresses to modified independence with sit to/from and stand pivot transfers. Pt ambulates 200' with FWW modified independently, slow charis but safe without LOB.  Barriers to return to prior living situation: None  Recommendations for discharge: Home with use of FWW  Rationale for recommendations: Pt modified independent with use of FWW, safe to return home from a mobility standpoint.       Entered by: Leidy Vee 01/12/2020 10:22 AM      Physical Therapy Discharge Summary    Reason for therapy discharge:    Discharged to home.    Progress towards therapy goal(s). See goals on Care Plan in [facility] health record for goal details.  Goals met    Therapy recommendation(s):    No further therapy is recommended.

## 2020-01-12 NOTE — PROGRESS NOTES
[facility]    Hospitalist Progress Note    Date of Service (when I saw the patient): 01/12/2020    Assessment & Plan   Tad Manning is a 79 year old male with complex PMHx including CAD s/p 3-v CABG, PAD s/p stenting/bypass and R BKA, CHF, paroxysmal a-fib on chronic anticoagulation with warfarin, and uncontrolled DM2 who was found to be hyperglycemic >600 in Cardiology clinic and was referred to an outside ED on 1/9/2020.     Hyperglycemia  DM2 with nephropathy and peripheral vascular disease, uncontrolled  * PTA: metformin 750 mg BID  *  on arrival without acidosis. A1c 14.1 on this admission. He was started on insulin during this hospitalization  - Increase Lantus from 13 to 20 units qhs today, 1/12  - Patient was started on aspart 1u per 15 g CHO and SSI during this hospitalization, but demonstrating difficulty with this. Would avoid carb coverage and sliding scale insulin at discharge  - Resume PTA metformin 750 mg BID today, 1/12  - High SSI available while hospitalized  Recent Labs   Lab 01/12/20  0845 01/12/20  0203 01/11/20  2111 01/11/20  1813 01/11/20  1707 01/11/20  1203  01/10/20  0537  01/09/20  2313  01/09/20  1038   GLC  --   --   --   --   --   --   --  262*  --  537*  --  660*   * 283* 225* 276* 375* 334*   < >  --    < >  --    < >  --     < > = values in this interval not displayed.       STORM on CKD stage III  Creatinine up to 1.33 from baseline ~1. Suspect pre-renal state due to polyuria from hyperglycemia. He received IV fluids with improvement.   - Creatinine 1.11 on 1/10  - Hold PTA Lasix and lisinopril  - Repeat BMP in AM    CAD, native heart, native vessels s/p 3-v CABG and subsequent PCI  PAD s/p left iliac artery stenting and fem-pop bypass, right BKA  Essential hypertension  * PTA on aspirin 81 mg daily, metoprolol  mg daily, lisinopril 5 mg daily, Lasix 20 mg daily, atorvastatin 20 mg daily  - Holding lisinopril and Lasix due to recent STORM  -  Continues on aspirin, metoprolol, and atorvastatin    Chronic biventricular systolic CHF  * LVEF 35-40% with mild-mod global hypokinesia of the LV and mildly decreased RV function per TTE in Oct 2018  - Has been compensated throughout this hospitalization  - Holding lisinopril and Lasix due to recent STORM  - Continues on PTA metoprolol    Paroxysmal atrial fibrillation  * PTA: metoprolol  mg daily, digoxin 125 mcg daily, warfarin  - Continues on PTA meds    GERD  - Continues on PTA pantoprazole    Major recurrent depressive disorder with anxiety  Insomnia  * PTA citalopram 20 mg qhs, melatonin 5 mg qhs, trazodone 150 mg qhs  - Continues on PTA meds    FEN: Regular diet  DVT Prophylaxis: Warfarin  Code Status: Full Code    Disposition: Expected discharge 1-2 days pending improvement in blood sugars, ongoing diabetes teaching, and OT eval. Will need home care RN at the very least. Also needs PCP appointment within 1 week    Sandra Angie Frye    Interval History   No events overnight. He was seen by Dr. Benz yesterday and was going to be discharged, but RN expressed concern about patient's ability to use insulin. Requiring frequent reminders for Novolog/sliding scale, demonstrating difficulty with carb counting.   - d/c carb coverage   - Increase lantus from 13 to 20 units qhs  - resume PTA metformin  - OT consult for cognitive screen  - insulin/diabetes teaching with RN  - confirmed with patient that he has a glucometer at home    -Data reviewed today: I reviewed all new labs and imaging results over the last 24 hours. I personally reviewed no images or EKG's today.    Physical Exam   Temp: 97.8  F (36.6  C) Temp src: Oral BP: 138/70 Pulse: 61 Heart Rate: 62 Resp: 18 SpO2: 98 % O2 Device: None (Room air)    Vitals:    01/10/20 1415 01/11/20 0402 01/12/20 0445   Weight: 71.8 kg (158 lb 6.4 oz) 72.1 kg (158 lb 15.2 oz) 70.9 kg (156 lb 4.9 oz)     Vital Signs with Ranges  Temp:  [97.8  F (36.6  C)-97.9  F (36.6   C)] 97.8  F (36.6  C)  Pulse:  [61-69] 61  Heart Rate:  [62] 62  Resp:  [18-19] 18  BP: (110-138)/(54-70) 138/70  SpO2:  [95 %-98 %] 98 %  I/O last 3 completed shifts:  In: 460 [P.O.:460]  Out: 300 [Urine:300]    Constitutional: Resting comfortably, NAD  Respiratory: Breathing non-labored. Lungs CTAB - no wheezes, crackles, or rhonchi  Cardiovascular: Heart RRR, no m/r/g. No pedal edema  GI: +BS, abd soft/NT  Musculoskeletal: s/p R BKA  Neuro: Alert, oriented x4, and appropriate, CARABALLO  Psych: Calm and cooperative    Medications     - MEDICATION INSTRUCTIONS -       Warfarin Therapy Reminder         aspirin  81 mg Oral QPM     atorvastatin  20 mg Oral At Bedtime     citalopram  20 mg Oral QPM     digoxin  125 mcg Oral Daily     insulin aspart  1-10 Units Subcutaneous TID AC     insulin aspart  1-7 Units Subcutaneous At Bedtime     insulin glargine  20 Units Subcutaneous At Bedtime     melatonin  5 mg Oral At Bedtime     metFORMIN  750 mg Oral BID w/meals     metoprolol succinate ER  100 mg Oral Daily     nystatin   Topical BID     pantoprazole  40 mg Oral Daily     sodium chloride (PF)  3 mL Intracatheter Q8H     traZODone  150 mg Oral At Bedtime     Data   Recent Labs   Lab 01/12/20  0832 01/11/20  0859 01/10/20  0537 01/09/20  2313  01/09/20  1038   WBC  --   --   --  13.2*  --   --    HGB  --   --   --  12.0*  --   --    MCV  --   --   --  90  --   --    PLT  --   --   --  101*  --   --    INR 2.00* 1.94* 2.02* 1.95*   < >  --    NA  --   --  133 129*  --  126*   POTASSIUM  --   --  3.6 4.4  --  4.6   CHLORIDE  --   --  101 95  --  92*   CO2  --   --  27 28  --  25   BUN  --   --  29 30  --  32*   CR  --   --  1.11 1.24  --  1.33*   ANIONGAP  --   --  5 6  --  9   JUSTINO  --   --  9.3 9.8  --  9.7   GLC  --   --  262* 537*  --  660*    < > = values in this interval not displayed.       No results found for this or any previous visit (from the past 24 hour(s)).

## 2020-01-12 NOTE — PLAN OF CARE
DATE & TIME: 01/11/20202. 7am-7pm               Cognitive Concerns/ Orientation : A&O x 4   BEHAVIOR & AGGRESSION TOOL COLOR: Green  CIWA SCORE: N/A   ABNL VS/O2: VSS on RA  MOBILITY: Up x SBA to bathroom with walker, RBKA-uses prosthesis, able to turn and reposition self in bed with minimal assist. Need reminders.  PAIN MANAGMENT: Denied  DIET: Mod carb. Good appetite.   BOWEL/BLADDER: Urinary frequency noted, Pt stated that his baseline, denies dysuria. Urine clear.   ABNL LAB/BG: WBC 13.2, Platelet 101, INR 1.94, , 334, covered w/ insulin per sliding scale and carb counting.  before supper, covered w/ Insulin and recheck was 276.   DRAIN/DEVICES: PIV saline lock  TELEMETRY RHYTHM: N/A  SKIN: Blanchable redness on buttock, Mepilex changed. Rashes to [place] area. Woc RN consulted for suspected pressure injury to coccyx. Turn and Repo done q2hrs.   TESTS/PROCEDURES: N/A  D/C DAY/GOALS/PLACE: Plan is discharge to home pending BG control and Insulin teaching  OTHER IMPORTANT INFO: Teaching on how to self administer Insulin done (several times this shift), still needs reinforcement. Continue to monitor.

## 2020-01-12 NOTE — PROGRESS NOTES
01/12/20 1000   Quick Adds   Type of Visit Initial PT Evaluation   Living Environment   Lives With friend(s)   Living Arrangements apartment   Home Accessibility no concerns   Transportation Anticipated family or friend will provide   Self-Care   Usual Activity Tolerance good   Current Activity Tolerance good   Regular Exercise No   Equipment Currently Used at Home walker, rolling   Functional Level Prior   Ambulation 1-->assistive equipment   Transferring 0-->independent   Fall history within last six months no   Which of the above functional risks had a recent onset or change? ambulation   General Information   Onset of Illness/Injury or Date of Surgery - Date 01/09/20   Referring Physician Dr. Benz   Patient/Family Goals Statement To go home   Pertinent History of Current Problem (include personal factors and/or comorbidities that impact the POC) Pt is a 79 year old male with history of right BKA, admitted with high blood sugar.   Cognitive Status Examination   Orientation orientation to person, place and time   Level of Consciousness alert   Follows Commands and Answers Questions 100% of the time   Pain Assessment   Patient Currently in Pain No   Range of Motion (ROM)   ROM Quick Adds No deficits were identified   Strength   Manual Muscle Testing Quick Adds No deficits were identified   Bed Mobility   Bed Mobility Comments Independent   Transfer Skills   Transfer Comments Sit to/from stand SBA   Gait   Gait Comments 25' FWW SBA   Balance   Balance Comments Good in sitting, fair in standing   General Therapy Interventions   Planned Therapy Interventions gait training   Clinical Impression   Criteria for Skilled Therapeutic Intervention yes, treatment indicated   PT Diagnosis Impaired ambulation   Influenced by the following impairments Decreased endurace and balance   Functional limitations due to impairments Difficulty with transfers and ambulation   Clinical Presentation Stable/Uncomplicated   Clinical  "Presentation Rationale VSS, pain controlled   Clinical Decision Making (Complexity) Low complexity   Therapy Frequency Daily   Predicted Duration of Therapy Intervention (days/wks) 1 day   Anticipated Equipment Needs at Discharge walker   Anticipated Discharge Disposition Home with Assist   Risk & Benefits of therapy have been explained Yes   Patient, Family & other staff in agreement with plan of care Yes   [facility] TM \"6 Clicks\"   2016, Trustees of [facility], under license to [employer].  All rights reserved.   6 Clicks Short Forms Basic Mobility Inpatient Short Form   [facility]  \"6 Clicks\" V.2 Basic Mobility Inpatient Short Form   1. Turning from your back to your side while in a flat bed without using bedrails? 4 - None   2. Moving from lying on your back to sitting on the side of a flat bed without using bedrails? 4 - None   3. Moving to and from a bed to a chair (including a wheelchair)? 4 - None   4. Standing up from a chair using your arms (e.g., wheelchair, or bedside chair)? 4 - None   5. To walk in hospital room? 4 - None   6. Climbing 3-5 steps with a railing? 3 - A Little   Basic Mobility Raw Score (Score out of 24.Lower scores equate to lower levels of function) 23   Total Evaluation Time   Total Evaluation Time (Minutes) 10     "

## 2020-01-12 NOTE — CONSULTS
Care Coordination:    Care Transition Initial Assessment - RN        Met with: Patient.  DATA   Active Problems:    Hyperglycemia       Cognitive Status: awake, alert and oriented.     Primary Care MD Name: Ismael  Contact information and PCP information verified: Yes  Lives With: friend(s)   Living Arrangements: apartment        Insurance concerns: No Insurance issues identified  ASSESSMENT  Patient currently receives the following services:  Pt lives in appt with roommate.  Has a working meter and supplies.  Has walker, w/c.  Has received HC in the past but not currently open to HC          Identified issues/concerns regarding health management: Pt admitted with Hyperglycemia, ARF.  Seen by PT/OT who are recommending home with home RN/OT?  Met with patient and explained role. Pt does not feel he needs home OT and was reluctant feeling he did not need home RN but did agree.      Pt/family was given the Medicare Compare list for Home Care, with associated star ratings to assist with choice for referrals/discharge planning Yes    Education was given to pt/family that star ratings are updated/maintained by Medicare and can be reviewed by visiting www.medicare.gov Yes  Pt would like to go with home care agency he worked with before.  Doesn't remember name.  Is going to call roommate and ask.  Per chart review it appears he had [facility].  CC to confirm choice with patient and send Orders    PLAN  Financial costs for the patient include TBD .  Patient given options and choices for discharge yes .  Patient/family is agreeable to the plan?  Yes:   Patient anticipates discharging to home .        Patient anticipates needs for home equipment: No  Transportation/person available to transport on day of discharge  is friend or family and have they been notified/set up Patient to call at d/c  Plan/Disposition: Home   Appointments: Pt wishes to set up own appts as he has to arrange transportation.  Aware that he will  need to see Dr. Guevara next week and arrange Diabetic education follow up through clinic. Pt again voiced he wishes to do this.      Care  (CTS) will continue to follow as needed.    Keeley Donahue RN BSN  Inpatient Care Coordination  [facility]  [phone]

## 2020-01-12 NOTE — PLAN OF CARE
Discharge Planner OT   Patient plan for discharge: Unknown.  Current status: Orders rec'd. Patient has been evaluated by PT who reports patient is IND with dressing, including donning prosthesis. IND with bathroom transfers and is alert and oriented.   Barriers to return to prior living situation: Defer to PT.   Recommendations for discharge: Defer to PT.   Rationale for recommendations: OT eval not indicated as patient is at his IND baseline. Here for elevated blood sugar. OT order completed.       Entered by: Verónica Stanford 01/12/2020 10:18 AM

## 2020-01-13 ENCOUNTER — APPOINTMENT (OUTPATIENT)
Dept: OCCUPATIONAL THERAPY | Facility: CLINIC | Age: 80
DRG: 638 | End: 2020-01-13
Attending: INTERNAL MEDICINE
Payer: MEDICARE

## 2020-01-13 VITALS
SYSTOLIC BLOOD PRESSURE: 131 MMHG | RESPIRATION RATE: 16 BRPM | BODY MASS INDEX: 22.96 KG/M2 | DIASTOLIC BLOOD PRESSURE: 68 MMHG | TEMPERATURE: 97.8 F | HEIGHT: 70 IN | HEART RATE: 62 BPM | WEIGHT: 160.4 LBS | OXYGEN SATURATION: 96 %

## 2020-01-13 LAB
GLUCOSE BLDC GLUCOMTR-MCNC: 219 MG/DL (ref 70–99)
GLUCOSE BLDC GLUCOMTR-MCNC: 234 MG/DL (ref 70–99)
GLUCOSE BLDC GLUCOMTR-MCNC: 277 MG/DL (ref 70–99)
INR PPP: 2.15 (ref 0.86–1.14)

## 2020-01-13 PROCEDURE — 00000146 ZZHCL STATISTIC GLUCOSE BY METER IP

## 2020-01-13 PROCEDURE — 85610 PROTHROMBIN TIME: CPT | Performed by: STUDENT IN AN ORGANIZED HEALTH CARE EDUCATION/TRAINING PROGRAM

## 2020-01-13 PROCEDURE — 97165 OT EVAL LOW COMPLEX 30 MIN: CPT | Mod: GO

## 2020-01-13 PROCEDURE — 25000132 ZZH RX MED GY IP 250 OP 250 PS 637: Mod: GY | Performed by: STUDENT IN AN ORGANIZED HEALTH CARE EDUCATION/TRAINING PROGRAM

## 2020-01-13 PROCEDURE — 25000132 ZZH RX MED GY IP 250 OP 250 PS 637: Mod: GY | Performed by: INTERNAL MEDICINE

## 2020-01-13 PROCEDURE — 97535 SELF CARE MNGMENT TRAINING: CPT | Mod: GO

## 2020-01-13 PROCEDURE — 36415 COLL VENOUS BLD VENIPUNCTURE: CPT | Performed by: STUDENT IN AN ORGANIZED HEALTH CARE EDUCATION/TRAINING PROGRAM

## 2020-01-13 PROCEDURE — 99238 HOSP IP/OBS DSCHRG MGMT 30/<: CPT | Performed by: INTERNAL MEDICINE

## 2020-01-13 PROCEDURE — 97530 THERAPEUTIC ACTIVITIES: CPT | Mod: GO

## 2020-01-13 RX ORDER — WARFARIN SODIUM 7.5 MG/1
7.5 TABLET ORAL
Status: DISCONTINUED | OUTPATIENT
Start: 2020-01-13 | End: 2020-01-13 | Stop reason: HOSPADM

## 2020-01-13 RX ADMIN — METOPROLOL SUCCINATE 100 MG: 100 TABLET, EXTENDED RELEASE ORAL at 08:18

## 2020-01-13 RX ADMIN — DIGOXIN 125 MCG: 0.12 TABLET ORAL at 08:18

## 2020-01-13 RX ADMIN — PANTOPRAZOLE SODIUM 40 MG: 40 TABLET, DELAYED RELEASE ORAL at 08:19

## 2020-01-13 RX ADMIN — METFORMIN HYDROCHLORIDE 750 MG: 750 TABLET, EXTENDED RELEASE ORAL at 08:18

## 2020-01-13 RX ADMIN — NYSTATIN: 100000 CREAM TOPICAL at 08:07

## 2020-01-13 ASSESSMENT — ACTIVITIES OF DAILY LIVING (ADL)
ADLS_ACUITY_SCORE: 16
ADLS_ACUITY_SCORE: 16
PREVIOUS_RESPONSIBILITIES: MEAL PREP;HOUSEKEEPING;FINANCES;MEDICATION MANAGEMENT
ADLS_ACUITY_SCORE: 16

## 2020-01-13 ASSESSMENT — MIFFLIN-ST. JEOR: SCORE: 1458.82

## 2020-01-13 NOTE — DISCHARGE SUMMARY
[facility]    Hospitalist Discharge Summary       Date of Admission:  1/9/2020  Date of Discharge:  1/13/2020  Discharging Provider: Oumar Wray MD      Discharge Diagnoses   Hyperglycemia secondary to uncontrolled DM2  STORM, improved    Follow-ups Needed After Discharge   Follow-up Appointments     Follow-up and recommended labs and tests       Follow up with primary care provider, Gene Guevara, within 7 days for   hospital follow- up.  The following labs/tests are recommended: BMP.           Hospital Course   Tad Manning is a 79 year old male with PMHx including CAD s/p 3-v CABG, PAD s/p stenting/bypass and R BKA, CHF, paroxysmal a-fib on chronic anticoagulation with warfarin, and uncontrolled DM2 who was admitted on 1/9/2019 from cardiology clinic with hyperglycemia > 600.  on arrival without acidosis. A1c 14.1 on this admission. He was started on insulin during this hospitalization. Blood glucoses improved to around 200 this admission, however patient was noted to have difficulty with understanding and managing sliding scale/mealtime insulin. Decision made to continue with Lantus only at discharge, and to introduce mealtime short acting insulin with PCP/diabetic education. Home care nurse provided per CC recommendation.    STORM on CKD stage III: Creatinine up to 1.33 from baseline ~1. Back to 1.1 at discharge.  - Okay to resume lisinopril and Lasix at discharge  - F/u BMP in one week    Consultations This Hospital Stay   PHARMACY TO DOSE WARFARIN  WOUND OSTOMY CONTINENCE NURSE  IP CONSULT  PHYSICAL THERAPY ADULT IP CONSULT  OCCUPATIONAL THERAPY ADULT IP CONSULT  OCCUPATIONAL THERAPY ADULT IP CONSULT  CARE TRANSITION RN/SW IP CONSULT    Code Status   Full Code    Time Spent on this Encounter   IOumar, personally saw the patient today and spent approximately 25 minutes discharging this patient.       Oumar Wray MD  [facility]  Hospital  ______________________________________________________________________    Physical Exam   Vital Signs: Temp: 97.8  F (36.6  C) Temp src: Oral BP: 131/68 Pulse: 62 Heart Rate: 61 Resp: 16 SpO2: 96 % O2 Device: None (Room air)    Weight: 160 lbs 6.4 oz    Constitutional: Male in NAD  Eyes: PERRL, nonicteric, normal ocular movements  HEENT: Normocephalic, atraumatic, oral mucosa moist  Respiratory: CTAB, no wheezing or crackles  Cardiovascular: RRR, normal S1/2, no m/r/g  GI: No organomegaly, normoactive bowel sounds, nontender, nondistended  Skin: No rashes  Musculoskeletal: R BKA, ambulates with walker  Neurologic: A&Ox3  Psychiatric: Appropriate affect and mood       Primary Care Physician   Gene Guevara    Discharge Disposition   Discharged to home  Condition at discharge: Stable    Significant Results and Procedures   Most Recent 3 CBC's:  Recent Labs   Lab Test 01/09/20  2313 01/14/19  1202 11/08/18  10/22/18  10/14/18  0757   WBC 13.2*  --   --   --  4.2  --  6.5   HGB 12.0* 12.6* 9.9*   < > 8.5*   < > 8.2*   MCV 90  --   --   --  91.8  --  89   *  --   --   --  188  --  130*    < > = values in this interval not displayed.     Most Recent 3 BMP's:  Recent Labs   Lab Test 01/10/20  0537 01/09/20  2313 01/09/20  1038    129* 126*   POTASSIUM 3.6 4.4 4.6   CHLORIDE 101 95 92*   CO2 27 28 25   BUN 29 30 32*   CR 1.11 1.24 1.33*   ANIONGAP 5 6 9   JUSTINO 9.3 9.8 9.7   * 537* 660*     Most Recent 2 LFT's:  Recent Labs   Lab Test 10/03/18  0636 09/22/18  0042   AST 15 11   ALT 14 27   ALKPHOS 73 126   BILITOTAL 0.6 0.9   ,   Results for orders placed or performed during the hospital encounter of 12/18/19   US Lower Extremity Arterial Duplex Left    Narrative    ULTRASOUND LOWER EXTREMITY ARTERIAL DUPLEX LEFT 12/18/2019 10:33 AM    HISTORY: 79-year-old patient with history of left above knee popliteal  to below knee popliteal surgical arterial bypass graft created in  2014.    COMPARISON:  December 19, 2018.    TECHNIQUE: Color Doppler and spectral waveform analysis performed  throughout the surgical bypass graft and adjacent native arteries.    FINDINGS: The left surgical arterial bypass graft is patent. No  velocity elevation to suggest stenosis. Velocities are diminished in  the distal graft, decreased to 32/2 cm/second, though previously 33/0  cm/second. No visible stenosis.      Impression    IMPRESSION: Patent left lower extremity surgical arterial bypass  graft. No evidence of stenosis.    ARLET SIMMONS MD       Discharge Orders      Home care nursing referral      Reason for your hospital stay    You had elevated BG and needed insulin     Follow-up and recommended labs and tests     Follow up with primary care provider, Gene Guevara, within 7 days for hospital follow- up.  The following labs/tests are recommended: BMP.     Activity    Your activity upon discharge: activity as tolerated     MD face to face encounter    Documentation of Face to Face and Certification for Home Health Services    I certify that patient: Tad Manning is under my care and that I, or a nurse practitioner or physician's assistant working with me, had a face-to-face encounter that meets the physician face-to-face encounter requirements with this patient on: 1/13/2020.    This encounter with the patient was in whole, or in part, for the following medical condition, which is the primary reason for home health care: uncontrolled diabetes.    I certify that, based on my findings, the following services are medically necessary home health services: Nursing.    My clinical findings support the need for the above services because: Nurse is needed: To assess diabetes control after changes in medications or other medical regimen..    Further, I certify that my clinical findings support that this patient is homebound (i.e. absences from home require considerable and taxing effort and are for medical reasons or Spiritism  services or infrequently or of short duration when for other reasons) because: Leaving home is medically contraindicated for the following reason(s): Dyspnea on exertion that makes it so they cannot leave their home for needed services without clinical deterioration...    Based on the above findings. I certify that this patient is confined to the home and needs intermittent skilled nursing care, physical therapy and/or speech therapy.  The patient is under my care, and I have initiated the establishment of the plan of care.  This patient will be followed by a physician who will periodically review the plan of care.  Physician/Provider to provide follow up care: Gene Guevara    Attending hospital physician (the Medicare certified PECOS provider): Oumar Wray MD  Physician Signature: See electronic signature associated with these discharge orders.  Date: 1/13/2020     Diet    Follow this diet upon discharge: Orders Placed This Encounter      Moderate Consistent CHO Diet     Discharge Medications   Current Discharge Medication List      START taking these medications    Details   insulin glargine (LANTUS SOLOSTAR) 100 UNIT/ML pen Inject 25 Units Subcutaneous At Bedtime Lantus Solostar Pen  Qty: 15 mL, Refills: 0    Comments: If Lantus is not covered by insurance, may substitute Basaglar at same dose and frequency.    Associated Diagnoses: Type 2 diabetes mellitus with stage 3 chronic kidney disease, unspecified whether long term insulin use (H)         CONTINUE these medications which have NOT CHANGED    Details   alcohol swab prep pads Use to swab area of injection/jina as directed.  Qty: 100 each, Refills: 3    Associated Diagnoses: Type 2 diabetes mellitus with stage 3 chronic kidney disease, unspecified whether long term insulin use (H)      aspirin (ASA) 81 MG tablet Take 1 tablet (81 mg) by mouth daily    Associated Diagnoses: CAD S/P percutaneous coronary angioplasty      atorvastatin (LIPITOR) 20 MG  tablet Take 1 tablet (20 mg) by mouth daily  Qty: 90 tablet, Refills: 0    Associated Diagnoses: Claudication of left lower extremity (H)      blood glucose (NO BRAND SPECIFIED) test strip Use to test blood sugar 4 times daily or as directed.  Qty: 100 strip, Refills: 6    Associated Diagnoses: Type 2 diabetes mellitus with stage 3 chronic kidney disease, unspecified whether long term insulin use (H)      blood glucose calibration (NO BRAND SPECIFIED) solution 1 drop every 14 days Use 1 drop to calibrate blood glucose monitor every 14 days  Qty: 1 Bottle, Refills: 3    Associated Diagnoses: Type 2 diabetes mellitus with stage 3 chronic kidney disease, unspecified whether long term insulin use (H)      blood glucose monitoring (NO BRAND SPECIFIED) meter device kit Use to test blood sugar 4 times daily or as directed.  Qty: 1 kit, Refills: 0    Associated Diagnoses: Type 2 diabetes mellitus with stage 3 chronic kidney disease, unspecified whether long term insulin use (H)      cholecalciferol 1000 UNITS TABS Take 1,000 Units by mouth daily  Qty: 30 tablet    Associated Diagnoses: Loop diuretic causing adverse effect in therapeutic use, subsequent encounter      citalopram (CELEXA) 20 MG tablet Take 20 mg by mouth every evening      digoxin (LANOXIN) 125 MCG tablet Take 1 tablet (125 mcg) by mouth daily  Qty: 90 tablet, Refills: 3    Associated Diagnoses: Chronic systolic heart failure (H)      ferrous sulfate (IRON) 325 (65 Fe) MG tablet Take 1 tablet (325 mg) by mouth daily  Qty: 100 tablet    Associated Diagnoses: Anemia, unspecified type      furosemide (LASIX) 20 MG tablet Take 1 tablet (20 mg) by mouth daily  Qty: 90 tablet, Refills: 3    Associated Diagnoses: Ischemic cardiomyopathy      hydrocortisone (CORTIZONE-10) 1 % ointment Apply topically 2 times daily as needed for itching      lisinopril (PRINIVIL/ZESTRIL) 5 MG tablet Take 1 tablet (5 mg) by mouth daily  Qty: 90 tablet, Refills: 3    Associated  Diagnoses: Essential hypertension      melatonin 5 MG tablet Take 5 mg by mouth At Bedtime      metFORMIN (GLUCOPHAGE-XR) 750 MG 24 hr tablet Take 750 mg by mouth 2 times daily (with meals)      metoprolol succinate ER (TOPROL-XL) 100 MG 24 hr tablet Take 1 tablet (100 mg) by mouth daily  Qty: 90 tablet, Refills: 3    Associated Diagnoses: Chronic systolic heart failure (H)      nitroglycerin (NITROSTAT) 0.4 MG sublingual tablet For chest pain place 1 tablet under the tongue every 5 minutes for 3 doses. If symptoms persist 5 minutes after 1st dose call 911.  Qty: 25 tablet    Associated Diagnoses: Atrial flutter, unspecified type (H)      nystatin (MYCOSTATIN) 431823 UNIT/GM external cream Apply topically daily as needed for dry skin       oxyCODONE IR (ROXICODONE) 5 MG tablet Take 1-2 tablets (5-10 mg) by mouth every 4 hours as needed for pain  Qty: 20 tablet, Refills: 0    Associated Diagnoses: Osteomyelitis of foot, right, acute (H)      pantoprazole (PROTONIX) 40 MG EC tablet Take 40 mg by mouth daily      polyethylene glycol (MIRALAX/GLYCOLAX) Packet Take 1 packet by mouth daily as needed       sodium chloride (EQ SALINE NASAL SPRAY) 0.65 % nasal spray Spray 1 spray into both nostrils daily as needed for congestion  Qty: 1 Bottle, Refills: 1      thin (NO BRAND SPECIFIED) lancets 1 each 4 times daily Use with lanceting device.  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    Comments: INR goal 2-3 for afib.  Associated Diagnoses: Atrial fibrillation, unspecified type (H)           Allergies   Allergies   Allergen Reactions     Blood Transfusion Related (Informational Only) Other (See Comments)     Patient has a history of a clinically significant antibody against RBC antigens.  A delay in compatible RBCs may occur.

## 2020-01-13 NOTE — PROGRESS NOTES
01/13/20 0900   Quick Adds   Type of Visit Initial Occupational Therapy Evaluation   Living Environment   Lives With friend(s)  (and friends adult grandson)   Living Arrangements apartment   Home Accessibility no concerns   Transportation Anticipated family or friend will provide   Self-Care   Usual Activity Tolerance good   Current Activity Tolerance good   Regular Exercise No   Equipment Currently Used at Home walker, rolling   Functional Level   Ambulation 1-->assistive equipment   Transferring 0-->independent   Toileting 0-->independent   Bathing 0-->independent  (sponge baths)   Dressing 0-->independent   Eating 0-->independent   Communication 0-->understands/communicates without difficulty   Cognition 0 - no cognition issues reported   Fall history within last six months no   Which of the above functional risks had a recent onset or change? ambulation   Prior Functional Level Comment Pt IND w/I/ADL's PTA   General Information   Onset of Illness/Injury or Date of Surgery - Date 01/09/20   Referring Physician Sandra Frye MD   Patient/Family Goals Statement Home   Additional Occupational Profile Info/Pertinent History of Current Problem Pt is a 79 year old male with history of right BKA, admitted with high blood sugar.   Precautions/Limitations fall precautions   Weight-Bearing Status - LLE full weight-bearing   Weight-Bearing Status - RLE full weight-bearing  (R BKA)   Cognitive Status Examination   Orientation orientation to person, place and time   Level of Consciousness alert   Visual Perception   Visual Perception Wears glasses   Sensory Examination   Sensory Comments baseline BLE, BUE peripheral neuopathy   Pain Assessment   Patient Currently in Pain No   Range of Motion (ROM)   ROM Quick Adds No deficits were identified   Strength   Strength Comments LUE 5/5. Rue 4/5   Hand Strength   Hand Strength Comments Slightly weaker in RUE   Coordination   Upper Extremity Coordination No deficits were  "identified   Transfer Skill: Sit to Stand   Level of Brown: Sit/Stand stand-by assist   Physical Assist/Nonphysical Assist: Sit/Stand supervision   Transfer Skill: Sit to Stand full weight-bearing   Assistive Device for Transfer: Sit/Stand rolling walker   Transfer Skill: Toilet Transfer   Level of Brown: Toilet stand-by assist   Physical Assist/Nonphysical Assist: Toilet supervision   Weight-Bearing Restrictions: Toilet full weight-bearing   Assistive Device rolling walker;grab bars   Lower Body Dressing   Level of Brown: Dress Lower Body independent   Toileting   Level of Brown: Toilet minimum assist (75% patients effort)   Physical Assist/Nonphysical Assist: Toilet supervision;1 person assist   Assistive Device grab bars;rolling walker   Grooming   Level of Brown: Grooming independent   Eating/Self Feeding   Level of Brown: Eating independent   Instrumental Activities of Daily Living (IADL)   Previous Responsibilities meal prep;housekeeping;finances;medication management   Activities of Daily Living Analysis   Impairments Contributing to Impaired Activities of Daily Living strength decreased   Clinical Impression   Criteria for Skilled Therapeutic Interventions Met evaluation only   OT Diagnosis Impaired functional transfers, decreased ind w/IADL   Influenced by the following impairments Strength   Assessment of Occupational Performance 1-3 Performance Deficits   Identified Performance Deficits IADL, functional transfers    Clinical Decision Making (Complexity) Low complexity   Therapy Frequency Daily   Predicted Duration of Therapy Intervention (days/wks) 7     Anticipated Discharge Disposition Home with Assist   Risks and Benefits of Treatment have been explained. Yes   Patient, Family & other staff in agreement with plan of care Yes   [facility] AM-PAC TM \"6 Clicks\"   2016, Trustees of [facility], under license to [employer].  All rights reserved.   6 " "Clicks Short Forms Daily Activity Inpatient Short Form   [facility] AM-PAC  \"6 Clicks\" Daily Activity Inpatient Short Form   1. Putting on and taking off regular lower body clothing? 4 - None   2. Bathing (including washing, rinsing, drying)? 3 - A Little   3. Toileting, which includes using toilet, bedpan or urinal? 3 - A Little   4. Putting on and taking off regular upper body clothing? 4 - None   5. Taking care of personal grooming such as brushing teeth? 4 - None   6. Eating meals? 4 - None   Daily Activity Raw Score (Score out of 24.Lower scores equate to lower levels of function) 22   Total Evaluation Time   Total Evaluation Time (Minutes) 10     "

## 2020-01-13 NOTE — PLAN OF CARE
DATE & TIME: 01/13/2020 AM   Cognitive Concerns/ Orientation : AOX4  BEHAVIOR & AGGRESSION TOOL COLOR: Green  CIWA SCORE: N/A   ABNL VS/O2: VSS ex low HR baseline  MOBILITY: SBA walker and prosthetic leg, ambulated in reina today  PAIN MANAGMENT: Denies pain  DIET: Regular diet  BOWEL/BLADDER: Urinal and up to bathroom for BM  ABNL LAB/BG:  and 219 this shift with insulin coverage  DRAIN/DEVICES: N/A  TELEMETRY RHYTHM: N/A  SKIN: Redness blanchable on buttock with meplex in place, rash on groin and inner thigh nystatin applied  TESTS/PROCEDURES: N/A  D/C DAY/GOALS/PLACE: Discharging home today, significant other will  at 1630  OTHER IMPORTANT INFO: Insulin administration demonstrated by patient, diabetic education discussed with patient

## 2020-01-13 NOTE — PLAN OF CARE
DATE & TIME: 01/12/2020. 7am-7pm            Cognitive Concerns/ Orientation : A&O x 4, forgetful at times  BEHAVIOR & AGGRESSION TOOL COLOR: Green  CIWA SCORE: N/A   ABNL VS/O2: VSS on RA  MOBILITY: Up x SBA to bathroom with walker, RBKA-uses prosthesis, able to turn and reposition self in bed with minimal assist. Need reminders.  PAIN MANAGMENT: Denied  DIET: Mod carb. Good appetite.   BOWEL/BLADDER: Continent this shift. Voiding per urinal.   ABNL LAB/BG: WBC 13.2, Platelet 101, INR 1.94,  and 267-covered w/ Insulin per sliding scale.   DRAIN/DEVICES: PIV saline lock  TELEMETRY RHYTHM: N/A  SKIN: Blanchable redness on buttock, Mepilex changed. Rashes to [place]oleon area. Woc RN consulted for suspected pressure injury to coccyx. Turn and Repo done q2hrs.   TESTS/PROCEDURES: N/A  D/C DAY/GOALS/PLACE: Plan is discharge to home tomorrow.   OTHER IMPORTANT INFO: Insulin self administration and accu check teaching done. Pt edu handouts with pictures (step by step) on Insulin injections and accu checks given. Plan is discharge to home tomorrow.

## 2020-01-13 NOTE — PLAN OF CARE
Discharge Planner OT   Patient plan for discharge: Home  Current status: OT orders rec'd, initial eval complete. Pt admitted for high blood sugar w/hx of BKA. Pt lives w/significant other and her adult grandson. Pt was IND at baseline w/I/ADL's, uses a walker in the community at baseline.     Pt sup<>sit SBA, sit<>stand SBA, Ambulation w/FWW SBA no LOB, toilet transfer SBA. Min A for napoleon-cares toileting. Min A to thread legs through new brief, otherwise IND, IND to don/doff prosthesis.     Barriers to return to prior living situation: none  Recommendations for discharge: Home w/assist of significant other for g/h w/toileting and LB dressing as needed. HH RN for medication management and bathing     Pt administered SLUMS exam to assess safety w/I/ADL's. Pt scored a 28/30 w/norm score being 27/30,indicating normal cognition.     Rationale for recommendations: Pt will benefit from HH RN for assist w/medication management and bathing as needed. Assist of significant other as needed for g/h w/toileting and LB dressing. Anticipate w/continued IP OT, pt will be at baseline for I/ADL's for safe home discharge once medically stable.        Entered by: Cherie Luu 01/13/2020 10:39 AM

## 2020-01-13 NOTE — PROGRESS NOTES
Please see Care Coordinator consult completed 1/12/20.  Care Coordinator met with pt this AM.  He has never had any diabetic education and is going home on new insulin.  HgbA1C is 14.1.  Pt has not been checking his sugars and admitted to eating many sweets over the holidays.  Explained importance of good diabetes control.  Pt is forgetful.  OT had recommended home RN.  This was discussed with rounding Hospitalist and orders were placed for home RN.  Pt's PCP is Dr Guevara at [facility].  An appointment has been scheduled for Friday 1/17/20 and an appointment with a Diabetic Educator for Friday 1/24/20.  The [facility] is not on [facility], so will fax pt's records.  Pt also wanted to use [facility] and have placed a call with message to this agency.    Addendum: Pt is ready for hospital discharge.  His significant other will be providing transportation home around 4:30PM.  Orders have been faxed to [facility] and Discharge Summary and H&P have been faxed to pt's PCP.

## 2020-01-13 NOTE — PROGRESS NOTES
Discharge    Patient discharged to home via private transportation with friend.   Care plan note see last.     Listed belongings gathered and returned to patient. Yes  Care Plan and Patient education resolved: Yes  Prescriptions if needed, hard copies sent with patient  Yes  Home and hospital acquired medications returned to patient: Yes  Medication Bin checked and emptied on discharge Yes  Follow up appointment made for patient: Yes

## 2020-01-13 NOTE — PLAN OF CARE
DATE & TIME: 1/12 from 1900 to 0730    Cognitive Concerns/ Orientation : A&O x 4, calm and cooperative for cares  BEHAVIOR & AGGRESSION TOOL COLOR: Green  CIWA SCORE: N/A   ABNL VS/O2: VSS on RA, except hypertensive   MOBILITY: Up x SBA to bathroom with walker/GB, RBKA, uses prosthesis. Able to turn and reposition self on sides in bed with minimal assistance. Pt requires reminders  PAIN MANAGMENT: Denied  DIET: Regular  BOWEL/BLADDER: Continent, Pt has frequency baseline for the patient, uses bedside urinal  ABNL LAB/BG: WBC 13.2, Platelet 101, INR 2.00 Hbg A1C 14.1 on admission, , 306 and 234  DRAIN/DEVICES: PIV saline lock  TELEMETRY RHYTHM: N/A  SKIN: Blanchable redness on buttock, Meplex dressing in placed. CDI. Rash on groin and perineal area, applied Nystatin cream per order.Turn and reposition Q 2 hours  TESTS/PROCEDURES: N/A  D/C DAY/GOALS/PLACE: Possible discharge to home tomorrow.   OTHER IMPORTANT INFO: Blood sugar checks and insulin self administration education provided.

## 2020-01-14 ENCOUNTER — ANTICOAGULATION THERAPY VISIT (OUTPATIENT)
Dept: ANTICOAGULATION | Facility: CLINIC | Age: 80
End: 2020-01-14

## 2020-01-14 DIAGNOSIS — Z79.01 LONG TERM CURRENT USE OF ANTICOAGULANT THERAPY: ICD-10-CM

## 2020-01-14 DIAGNOSIS — I48.0 PAROXYSMAL ATRIAL FIBRILLATION (H): ICD-10-CM

## 2020-01-14 NOTE — PLAN OF CARE
Occupational Therapy Discharge Summary    Reason for therapy discharge:    Discharged to home with home therapy.    Progress towards therapy goal(s). See goals on Care Plan in [facility] electronic health record for goal details.  Goals partially met.  Barriers to achieving goals:   discharge from facility.    Therapy recommendation(s):    No further therapy is recommended. Per POC/chart:Home w/assist of significant other for g/h w/toileting and LB dressing as needed.  RN for medication management and bathing; Pt will benefit from  RN for assist w/medication management and bathing as needed. Assist of significant other as needed for g/h w/toileting and LB dressing.     Pt. Discharged to home 1/14/20.     Note:This writer did not see/treat pt.--discharge written from chart review.

## 2020-01-14 NOTE — PROGRESS NOTES
ANTICOAGULATION  MANAGEMENT: Discharge Review    Tad Manning chart reviewed for anticoagulation continuity of care    Hospital Admission on 1/9/1/13 for Hyperglycemia due to uncontrolled DM2-(NL=956) without acidosis.  Pt had lots of sweets over the holidays.    Discharge disposition: Home with Home Care    INR Results:    Recent Labs   Lab Test 01/13/20  0857 01/12/20  0832 01/11/20  0859   INR 2.15* 2.00* 1.94*       Warfarin inpatient management: home regimen continued    Warfarin discharge instructions: Next INR 1/16     Medication Changes Affecting Anticoagulation: Yes: Started Lantus    Additional Factors Affecting Anticoagulation: No    Plan     Agree with dosing adjustment on discharge    Left a detailed message for patient. Also called his daughter, Joyce, and spoke with [facility] nurse, Mackenzie.    Anticoagulation Calendar updated    Chelsea Bland RN

## 2020-01-16 ENCOUNTER — ANTICOAGULATION THERAPY VISIT (OUTPATIENT)
Dept: ANTICOAGULATION | Facility: CLINIC | Age: 80
End: 2020-01-16

## 2020-01-16 DIAGNOSIS — I48.0 PAROXYSMAL ATRIAL FIBRILLATION (H): ICD-10-CM

## 2020-01-16 DIAGNOSIS — Z79.01 LONG TERM CURRENT USE OF ANTICOAGULANT THERAPY: ICD-10-CM

## 2020-01-16 LAB — INR PPP: 2.1 (ref 0.9–1.1)

## 2020-01-16 NOTE — PROGRESS NOTES
ANTICOAGULATION FOLLOW-UP CLINIC VISIT    Patient Name:  Tad Manning  Date:  2020  Contact Type:  Telephone    SUBJECTIVE:  Patient Findings     Comments:   Recently hospitalized, dose of Lantus adjusted to 25 units at HS.        Clinical Outcomes     Comments:   Recently hospitalized, dose of Lantus adjusted to 25 units at HS.           OBJECTIVE    INR   Date Value Ref Range Status   2020 2.1 (A) 0.90 - 1.10 Final     Comment:     Home care       ASSESSMENT / PLAN  INR assessment THER    Recheck INR In: 1 WEEK    INR Location Homecare INR      Anticoagulation Summary  As of 2020    INR goal:   2.0-3.0   TTR:   62.1 % (11.8 mo)   INR used for dosin.1 (2020)   Warfarin maintenance plan:   7.5 mg (5 mg x 1.5) every Mon, Fri; 5 mg (5 mg x 1) all other days   Full warfarin instructions:   7.5 mg every Mon, Fri; 5 mg all other days   Weekly warfarin total:   40 mg   No change documented:   Darien Ballard RN   Plan last modified:   Vicki Hearn RN (2019)   Next INR check:   2020   Priority:   High   Target end date:   Indefinite    Indications    Atrial fibrillation -- on Warfarin [I48.91]  Long-term (current) use of anticoagulants [Z79.01] [Z79.01]             Anticoagulation Episode Summary     INR check location:       Preferred lab:       Send INR reminders to:   [facility]    Comments:   HIPPA Infor returned. OK to  on cell [phone].  labs @ [facility] faxed there .  fax: [phone]    Goes by [facility]ima      Anticoagulation Care Providers     Provider Role Specialty Phone number    Clarisse Valdes MD Responsible Cardiology [phone]            See the Encounter Report to view Anticoagulation Flowsheet and Dosing Calendar (Go to Encounters tab in chart review, and find the Anticoagulation Therapy Visit)    INR/CFX/F2 RESULT: INR result is 2.1 per Home care nurseMackenzie    ASSESSMENT:No Problems found. No Changes in Health,  Medications, or diet. No Signs of bruising, bleeding or clotting.    DOSING ADJUSTMENT: continue current maintenance dose    NEXT INR/FACTOR X OR FACTOR II: 1/23    PROTOCOL FOLLOWED:goal 2-3    Darien Ballard RN

## 2020-01-23 ENCOUNTER — ANTICOAGULATION THERAPY VISIT (OUTPATIENT)
Dept: ANTICOAGULATION | Facility: CLINIC | Age: 80
End: 2020-01-23

## 2020-01-23 DIAGNOSIS — Z79.01 LONG TERM CURRENT USE OF ANTICOAGULANT THERAPY: ICD-10-CM

## 2020-01-23 DIAGNOSIS — I48.0 PAROXYSMAL ATRIAL FIBRILLATION (H): ICD-10-CM

## 2020-01-23 LAB — INR PPP: 2 (ref 0.9–1.1)

## 2020-01-23 NOTE — PROGRESS NOTES
ANTICOAGULATION FOLLOW-UP CLINIC VISIT    Patient Name:  Tad Manning  Date:  2020  Contact Type:  Telephone    SUBJECTIVE:         OBJECTIVE    INR   Date Value Ref Range Status   2020 2.0 (A) 0.90 - 1.10 Final     Comment:     Home care INR       ASSESSMENT / PLAN  INR assessment THER    Recheck INR In: 2 WEEKS    INR Location Homecare INR      Anticoagulation Summary  As of 2020    INR goal:   2.0-3.0   TTR:   62.1 % (11.8 mo)   INR used for dosin.0 (2020)   Warfarin maintenance plan:   7.5 mg (5 mg x 1.5) every Mon, Fri; 5 mg (5 mg x 1) all other days   Full warfarin instructions:   7.5 mg every Mon, Fri; 5 mg all other days   Weekly warfarin total:   40 mg   No change documented:   Darien Ballard, RN   Plan last modified:   Vicki Hearn RN (2019)   Next INR check:   2020   Priority:   High   Target end date:   Indefinite    Indications    Atrial fibrillation -- on Warfarin [I48.91]  Long-term (current) use of anticoagulants [Z79.01] [Z79.01]             Anticoagulation Episode Summary     INR check location:       Preferred lab:       Send INR reminders to:   [facility]    Comments:   HIPPA Infor returned. OK to  on cell [phone].  labs @ [facility] faxed there .  fax: [phone]    Goes by Hima      Anticoagulation Care Providers     Provider Role Specialty Phone number    Clarisse Valdes MD [facility] Cardiology [phone]            See the Encounter Report to view Anticoagulation Flowsheet and Dosing Calendar (Go to Encounters tab in chart review, and find the Anticoagulation Therapy Visit)    INR/CFX/F2 RESULT: INR result is 2.0 on  per home care nurseMackenzie     ASSESSMENT:No Problems found. No Changes in Health, Medications, or diet. No Signs of bruising, bleeding or clotting.    DOSING ADJUSTMENT: continue current maintenance dose    NEXT INR/FACTOR X OR FACTOR II:     PROTOCOL FOLLOWED:goal 2-3    Elio  Darien FREDERICK RN

## 2020-02-06 ENCOUNTER — ANTICOAGULATION THERAPY VISIT (OUTPATIENT)
Dept: ANTICOAGULATION | Facility: CLINIC | Age: 80
End: 2020-02-06

## 2020-02-06 DIAGNOSIS — I48.0 PAROXYSMAL ATRIAL FIBRILLATION (H): ICD-10-CM

## 2020-02-06 DIAGNOSIS — Z79.01 LONG TERM CURRENT USE OF ANTICOAGULANT THERAPY: ICD-10-CM

## 2020-02-06 LAB — INR PPP: 2.8 (ref 0.9–1.1)

## 2020-02-06 NOTE — PROGRESS NOTES
ANTICOAGULATION FOLLOW-UP CLINIC VISIT    Patient Name:  Tad Manning  Date:  2020  Contact Type:  Telephone    SUBJECTIVE:         OBJECTIVE    INR   Date Value Ref Range Status   2020 2.8 (A) 0.90 - 1.10 Final       ASSESSMENT / PLAN  No question data found.  Anticoagulation Summary  As of 2020    INR goal:   2.0-3.0   TTR:   64.6 % (11.8 mo)   INR used for dosin.8 (2020)   Warfarin maintenance plan:   7.5 mg (5 mg x 1.5) every Mon, Fri; 5 mg (5 mg x 1) all other days   Full warfarin instructions:   7.5 mg every Mon, Fri; 5 mg all other days   Weekly warfarin total:   40 mg   No change documented:   Vicki Hearn RN   Plan last modified:   Vicki Hearn RN (2019)   Next INR check:   2020   Priority:   High   Target end date:   Indefinite    Indications    Atrial fibrillation -- on Warfarin [I48.91]  Long-term (current) use of anticoagulants [Z79.01] [Z79.01]             Anticoagulation Episode Summary     INR check location:       Preferred lab:       Send INR reminders to:   [facility]    Comments:   HIPPA Infor returned. OK to  on cell [phone].  [facility] .  fax: [phone]    Goes by Hima      Anticoagulation Care Providers     Provider Role Specialty Phone number    Clarisse Valdes MD [facility] Cardiology [phone]            See the Encounter Report to view Anticoagulation Flowsheet and Dosing Calendar (Go to Encounters tab in chart review, and find the Anticoagulation Therapy Visit)    Spoke with Aubrie CASIANO.     Vicki Hearn RN

## 2020-02-16 ENCOUNTER — HEALTH MAINTENANCE LETTER (OUTPATIENT)
Age: 80
End: 2020-02-16

## 2020-02-17 DIAGNOSIS — I50.22 CHRONIC SYSTOLIC HEART FAILURE (H): Primary | ICD-10-CM

## 2020-02-20 ENCOUNTER — OFFICE VISIT (OUTPATIENT)
Dept: CARDIOLOGY | Facility: CLINIC | Age: 80
End: 2020-02-20
Attending: NURSE PRACTITIONER
Payer: MEDICARE

## 2020-02-20 ENCOUNTER — ANTICOAGULATION THERAPY VISIT (OUTPATIENT)
Dept: ANTICOAGULATION | Facility: CLINIC | Age: 80
End: 2020-02-20

## 2020-02-20 VITALS
SYSTOLIC BLOOD PRESSURE: 109 MMHG | HEIGHT: 71 IN | BODY MASS INDEX: 24.14 KG/M2 | HEART RATE: 72 BPM | OXYGEN SATURATION: 99 % | DIASTOLIC BLOOD PRESSURE: 65 MMHG | WEIGHT: 172.4 LBS

## 2020-02-20 DIAGNOSIS — Z79.01 LONG TERM CURRENT USE OF ANTICOAGULANT THERAPY: ICD-10-CM

## 2020-02-20 DIAGNOSIS — I50.22 CHRONIC SYSTOLIC HEART FAILURE (H): Primary | ICD-10-CM

## 2020-02-20 DIAGNOSIS — I50.22 CHRONIC SYSTOLIC HEART FAILURE (H): ICD-10-CM

## 2020-02-20 DIAGNOSIS — I73.9 PAD (PERIPHERAL ARTERY DISEASE) (H): ICD-10-CM

## 2020-02-20 DIAGNOSIS — I48.0 PAROXYSMAL ATRIAL FIBRILLATION (H): ICD-10-CM

## 2020-02-20 DIAGNOSIS — I25.83 CORONARY ARTERY DISEASE DUE TO LIPID RICH PLAQUE: ICD-10-CM

## 2020-02-20 DIAGNOSIS — I25.10 CORONARY ARTERY DISEASE DUE TO LIPID RICH PLAQUE: ICD-10-CM

## 2020-02-20 DIAGNOSIS — I70.229 CRITICAL LOWER LIMB ISCHEMIA (H): ICD-10-CM

## 2020-02-20 DIAGNOSIS — Z86.79 HISTORY OF CHRONIC ATRIAL FIBRILLATION: ICD-10-CM

## 2020-02-20 LAB
ANION GAP SERPL CALCULATED.3IONS-SCNC: 7 MMOL/L (ref 3–14)
BUN SERPL-MCNC: 33 MG/DL (ref 7–30)
CALCIUM SERPL-MCNC: 9.7 MG/DL (ref 8.5–10.1)
CHLORIDE SERPL-SCNC: 105 MMOL/L (ref 94–109)
CO2 SERPL-SCNC: 26 MMOL/L (ref 20–32)
CREAT SERPL-MCNC: 1.22 MG/DL (ref 0.66–1.25)
GFR SERPL CREATININE-BSD FRML MDRD: 57 ML/MIN/{1.73_M2}
GLUCOSE SERPL-MCNC: 181 MG/DL (ref 70–99)
INR PPP: 2.45 (ref 0.86–1.14)
POTASSIUM SERPL-SCNC: 4.3 MMOL/L (ref 3.4–5.3)
SODIUM SERPL-SCNC: 138 MMOL/L (ref 133–144)

## 2020-02-20 PROCEDURE — G0463 HOSPITAL OUTPT CLINIC VISIT: HCPCS | Mod: ZF

## 2020-02-20 PROCEDURE — 36415 COLL VENOUS BLD VENIPUNCTURE: CPT | Performed by: NURSE PRACTITIONER

## 2020-02-20 PROCEDURE — 80048 BASIC METABOLIC PNL TOTAL CA: CPT | Performed by: NURSE PRACTITIONER

## 2020-02-20 PROCEDURE — 99214 OFFICE O/P EST MOD 30 MIN: CPT | Mod: ZP | Performed by: NURSE PRACTITIONER

## 2020-02-20 PROCEDURE — 85610 PROTHROMBIN TIME: CPT | Performed by: NURSE PRACTITIONER

## 2020-02-20 ASSESSMENT — MIFFLIN-ST. JEOR: SCORE: 1521.19

## 2020-02-20 ASSESSMENT — PAIN SCALES - GENERAL: PAINLEVEL: NO PAIN (0)

## 2020-02-20 NOTE — NURSING NOTE
Chief Complaint   Patient presents with     Follow Up     Return CORE, 78 yo male, with a past medical history of CLL in remission, CAD s/p CABG in 1995, HFrEF, atrial flutter, PVD s/p iliac artery stents and left lower extremity bypass, hypertension, diabetes, hyperlipidemia, discitis and multple admission for decompensated ICM/HFrEF who presents for follow-up of cardiomyopathy.      Vitals were taken and medications reconciled.     Miguel Dao CMA  10:54 AM

## 2020-02-20 NOTE — PATIENT INSTRUCTIONS
Take your medicines every day, as directed    Changes made today:  o No changes     Monitor Your Weight and Symptoms    Contact us if you:      Gain 2 pounds in one day or 5 pounds in one week    Feel more short of breath    Notice more leg swelling    Feel lightheadeded   Change your lifestyle    Limit Salt or Sodium:    2000 mg  Limit Fluids:    2000 mL or approximately 64 ounces  Eat a Heart Healthy Diet    Low in saturated fats  Stay Active:    Aim to move at least 150 minutes every  week         To Contact us    During Business Hours:  [phone], option # 1 (University)  Then option # 4 (medical questions)     After hours, weekends or holidays:   [phone], Option #4  Ask to speak to the On-Call Cardiologist. Inform them you are a CORE/heart failure patient at the [facility].     Use W[employer]21 allows you to communicate directly with your heart team through secure messaging.    [employer] can be accessed any time on your phone, computer, or tablet.    If you need assistance, we'd be happy to help!         Keep your Heart Appointments:    Follow-up with Dr. Valdes in May.

## 2020-02-20 NOTE — LETTER
2/20/2020      RE: Tad Manning  [address]  [place] [address]       Dear Colleague,    Thank you for the opportunity to participate in the care of your patient, Tad Manning, at the [facility]. Please see a copy of my visit note below.    HPI:   Mr. Manning is a 79 year old male with a past medical history including CLL in remission, CAD s/p CABG in 1995, HFrEF 2/2 ICM, atrial flutter, PVD s/p iliac artery stents and left lower extremity bypass, hypertension, diabetes, hyperlipidemia, discitis and multple admission for decompensated heart failure. Presents to clinic for CORE f/u.     At his most recent CORE visit on 1/9/20 with Dr. Sabillon, Mr. Howard was restarted on Metoprolol with suggestion for changing Lisinopril to Entresto, but this was not done due to restarting Metoprolol. Following his last CORE visit on 1/9/20, Mr. Manning was found to have a blood glucose of > 600 and was sent to the ER for a hospital admission from 1/9-1/13 for hyperglycemia. He was discharged on Lantus, Metformin, and diabetes education. Primary MD has been following diabetes management. He has since had PT/OT and a weekly home care nurse. He checks BG TID, which have been stable.     Today, Mr. Manning presents with his sister-in-law. He reports a fall yesterday, precipitated by lightheadedness after standing and walking with his walker. He fell onto his knees but did not hit his head or sustain other obvious injury. Denies extensive bruising or bleeding. His blood sugar log shows BG >120 around the time of the fall. He denies palpitations at that time, or any recent patterns of lightheadedness. Does not check his BP at home, but home care nurse dose 1x per week. Unsure about what BPs have been. Denies SOB, orthopnea, PND, or edema. Weight graph indicates approximately 10 lb weight gain since 1/13/20. He denies CP/pressure or  claudication.       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)      Coronary artery disease     triple bypass      Diabetes mellitus (H)      Diabetic polyneuropathy (H)      History of blood transfusion      Hyperlipidaemia      Hypertension      Non-healing ulcer of foot (H)     Right     Noninflammatory pericardial effusion      Osteomyelitis of left foot (H)      PVD (peripheral vascular disease) (H)      Sebaceous cyst        FAMILY HISTORY:  Family History   Problem Relation Age of Onset     Cancer Mother         leukemia       SOCIAL HISTORY:  Social History     Socioeconomic History     Marital status: Single     Spouse name: None     Number of children: None     Years of education: None     Highest education level: None   Occupational History     None   Social Needs     Financial resource strain: None     Food insecurity:     Worry: None     Inability: None     Transportation needs:     Medical: None     Non-medical: None   Tobacco Use     Smoking status: Former Smoker     Packs/day: 2.00     Years: 30.00     Pack years: 60.00     Types: Cigarettes     Last attempt to quit: 1995     Years since quittin.6     Smokeless tobacco: Never Used   Substance and Sexual Activity     Alcohol use: No     Drug use: No     Sexual activity: None   Lifestyle     Physical activity:     Days per week: None     Minutes per session: None     Stress: None   Relationships     Social connections:     Talks on phone: None     Gets together: None     Attends Sabianist service: None     Active member of club or organization: None     Attends meetings of clubs or organizations: None     Relationship status: None     Intimate partner violence:     Fear of current or ex  partner: None     Emotionally abused: None     Physically abused: None     Forced sexual activity: None   Other Topics Concern     Parent/sibling w/ CABG, MI or angioplasty before 65F 55M? Not Asked   Social History Narrative    Lives independently with SO. 2/9/2017        CURRENT MEDICATIONS:  alcohol swab prep pads, Use to swab area of injection/jina as directed.  aspirin (ASA) 81 MG tablet, Take 1 tablet (81 mg) by mouth daily  atorvastatin (LIPITOR) 20 MG tablet, Take 1 tablet (20 mg) by mouth daily  blood glucose (NO BRAND SPECIFIED) test strip, Use to test blood sugar 4 times daily or as directed.  blood glucose calibration (NO BRAND SPECIFIED) solution, 1 drop every 14 days Use 1 drop to calibrate blood glucose monitor every 14 days  blood glucose monitoring (NO BRAND SPECIFIED) meter device kit, Use to test blood sugar 4 times daily or as directed.  cholecalciferol 1000 UNITS TABS, Take 1,000 Units by mouth daily  citalopram (CELEXA) 20 MG tablet, Take 20 mg by mouth every evening  digoxin (LANOXIN) 125 MCG tablet, Take 1 tablet (125 mcg) by mouth daily  ferrous sulfate (IRON) 325 (65 Fe) MG tablet, Take 1 tablet (325 mg) by mouth daily  furosemide (LASIX) 20 MG tablet, Take 1 tablet (20 mg) by mouth daily  hydrocortisone (CORTIZONE-10) 1 % ointment, Apply topically 2 times daily as needed for itching  insulin glargine (LANTUS SOLOSTAR) 100 UNIT/ML pen, Inject 25 Units Subcutaneous At Bedtime Lantus Solostar Pen  melatonin 5 MG tablet, Take 5 mg by mouth At Bedtime  metFORMIN (GLUCOPHAGE-XR) 750 MG 24 hr tablet, Take 750 mg by mouth 2 times daily (with meals)  metoprolol succinate ER (TOPROL-XL) 100 MG 24 hr tablet, Take 1 tablet (100 mg) by mouth daily  nitroglycerin (NITROSTAT) 0.4 MG sublingual tablet, For chest pain place 1 tablet under the tongue every 5 minutes for 3 doses. If symptoms persist 5 minutes after 1st dose call 911.  nystatin (MYCOSTATIN) 124901 UNIT/GM external cream, Apply topically  "daily as needed for dry skin   order for DME, Equipment being ordered: DH2 shoe  oxyCODONE IR (ROXICODONE) 5 MG tablet, Take 1-2 tablets (5-10 mg) by mouth every 4 hours as needed for pain  pantoprazole (PROTONIX) 40 MG EC tablet, Take 40 mg by mouth daily  PROVIDER DOSED MANAGED WARFARIN, COUMADIN, OUTPATIENT, Dose based on INR per Primary MD/Nursing Home MD.  sodium chloride (EQ SALINE NASAL SPRAY) 0.65 % nasal spray, Spray 1 spray into both nostrils daily as needed for congestion  thin (NO BRAND SPECIFIED) lancets, 1 each 4 times daily Use with lanceting device.  traZODone (DESYREL) 50 MG tablet, Take 150 mg by mouth At Bedtime  warfarin ANTICOAGULANT (COUMADIN) 5 MG tablet, 7.5 mg M/F  5 mg AOD  lisinopril (PRINIVIL/ZESTRIL) 5 MG tablet, Take 1 tablet (5 mg) by mouth daily  polyethylene glycol (MIRALAX/GLYCOLAX) Packet, Take 1 packet by mouth daily as needed     No current facility-administered medications on file prior to visit.       ROS:   Refer to HPI    EXAM:  /65 (BP Location: Right arm, Patient Position: Chair, Cuff Size: Adult Regular)   Pulse 72   Ht 1.791 m (5' 10.5\")   Wt 78.2 kg (172 lb 6.4 oz)   SpO2 99%   BMI 24.39 kg/m     GENERAL: Appears comfortable, in no acute distress.   HEENT: Eye symmetrical, no discharge or icterus bilaterally. Mucous membranes moist and without lesions.  CV: RRR, +S1S2, no murmur, rub, or gallop. JVP no visible at 90 degrees.   RESPIRATORY: Respirations regular, even, and unlabored. Lungs CTA throughout without wheezing, crackles.   GI: Soft and non distended with normoactive bowel sounds present in all quadrants. No tenderness, rebound, guarding. No hepatomegaly.   EXTREMITIES: No peripheral edema. 2+ bilateral pedal pulses.   NEUROLOGIC: Alert and oriented x 3. No focal deficits.   MUSCULOSKELETAL: No joint swelling or tenderness.   SKIN: No jaundice. No rashes or lesions.     Labs, reviewed with patient in clinic today:  CBC RESULTS:  Lab Results   Component " Value Date    WBC 13.2 (H) 01/09/2020    RBC 3.81 (L) 01/09/2020    HGB 12.0 (L) 01/09/2020    HCT 34.1 (L) 01/09/2020    MCV 90 01/09/2020    MCH 31.5 01/09/2020    MCHC 35.2 01/09/2020    RDW 13.6 01/09/2020     (L) 01/09/2020       CMP RESULTS:  Lab Results   Component Value Date     02/20/2020    POTASSIUM 4.3 02/20/2020    CHLORIDE 105 02/20/2020    CO2 26 02/20/2020    ANIONGAP 7 02/20/2020     (H) 02/20/2020    BUN 33 (H) 02/20/2020    CR 1.22 02/20/2020    GFRESTIMATED 57 (L) 02/20/2020    GFRESTBLACK 66 02/20/2020    JUSTINO 9.7 02/20/2020    BILITOTAL 0.6 10/03/2018    ALBUMIN 2.0 (L) 10/03/2018    ALKPHOS 73 10/03/2018    ALT 14 10/03/2018    AST 15 10/03/2018        INR RESULTS:  Lab Results   Component Value Date    INR 2.45 (H) 02/20/2020       Lab Results   Component Value Date    MAG 1.8 09/22/2018     Lab Results   Component Value Date    NTBNPI 5,223 (H) 10/08/2018     Lab Results   Component Value Date    NTBNP 22,172 (H) 02/09/2018       Diagnostics:  12/18/19 L U/S  FINDINGS: The left surgical arterial bypass graft is patent. No  velocity elevation to suggest stenosis. Velocities are diminished in  the distal graft, decreased to 32/2 cm/second, though previously 33/0  cm/second. No visible stenosis.                                                                      IMPRESSION: Patent left lower extremity surgical arterial bypass  graft. No evidence of stenosis.      10/5/18 TTE  Interpretation Summary     The visual ejection fraction is estimated at 35-40%.  There is mild-moderate global hypokinesia of the left ventricle.  Mildly decreased right ventricular systolic function     In comparison with the previous study the LVEF has improved slightly.  _____________________________________________________________________________  __        Left Ventricle  The left ventricle is normal in size. The visual ejection fraction is  estimated at 35-40%. There is mild-moderate global  hypokinesia of the left  ventricle.     Right Ventricle  Mildly decreased right ventricular systolic function.     Atria  The left atrium is mild to moderately dilated. The right atrium is mild to  moderately dilated. There is no atrial shunt seen.        Mitral Valve  The mitral valve leaflets are mildly thickened. There is trace mitral  regurgitation.     Tricuspid Valve  The tricuspid valve is not well visualized, but is grossly normal. There is  trace tricuspid regurgitation. Right ventricular systolic pressure could not  be approximated due to inadequate tricuspid regurgitation.     Aortic Valve  There is moderate trileaflet aortic sclerosis. There is trace aortic  regurgitation. No hemodynamically significant valvular aortic stenosis.     Pulmonic Valve  The pulmonic valve is not well seen, but is grossly normal. There is mild (1+)  pulmonic valvular regurgitation.     Vessels  Borderline aortic root dilatation. Normal size ascending aorta.    Assessment and Plan:   Mr. Manning is a 79 year old male with chronic system heart failure 2/2 ischemic cardiomyopathy who presents to [facility] for follow up. His last echo in 2018 shows EF 35-40%. Today we were unable to change his Lisinopril to Entresto due to a fall yesterday concerning for low BP. We decided to set up CardioCOM for blood pressure and weight monitoring from home, as his weekly homecare nurse will be ending this week. We can make adjustments the next visit to Entresto if blood pressures remain stable.     # Chronic systolic heart failure/HFrEF secondary to NICM    Stage C. NYHA Class III.    Fluid status: euvolemic  ACEi/ARB/ARNI: yes: Lisinopril 5mg daily, plan to change to Entresto if BPs remain stable/no further falls  BB: yes: metoprolol 100mg QD  Aldosterone antagonist: deferred while other medical therapy is prioritized  SCD prophylaxis: None: EF 35%  NSAID use: contraindicated  Sleep apnea evaluation: Denies daytime sleepiness.  Remote monitoring:  Plan to set up CardioCOM today     # CAD s/p CABG  # PAD s/p iliac stenting and LE bypass  -no symptoms to suggest angina, he is on asa and statin (defer high potency to cardiologist); he has stable claudication     # Atrial flutter  -he is on BB and digoxin and warfarin      Follow up in one month with Dr. Valdes. Will monitor home BPs and weight with CardioCOM.       Pinky Hampton, RN, DNP Student      Agree with student's documentation and findings. Note above reflects our joint assessment and plan.     Dai Sabillon DNP, NP-C  2/20/2020        30 minutes spent face-to-face with patient, >50% in counseling and/or coordination of care as described above    FABY ZURITA

## 2020-02-20 NOTE — PROGRESS NOTES
ANTICOAGULATION FOLLOW-UP CLINIC VISIT    Patient Name:  Tda Manning  Date:  2020  Contact Type:  Telephone    SUBJECTIVE:         OBJECTIVE    INR   Date Value Ref Range Status   2020 2.45 (H) 0.86 - 1.14 Final       ASSESSMENT / PLAN  No question data found.  Anticoagulation Summary  As of 2020    INR goal:   2.0-3.0   TTR:   65.6 % (11.8 mo)   INR used for dosin.45 (2020)   Warfarin maintenance plan:   7.5 mg (5 mg x 1.5) every Mon, Fri; 5 mg (5 mg x 1) all other days   Full warfarin instructions:   7.5 mg every Mon, Fri; 5 mg all other days   Weekly warfarin total:   40 mg   No change documented:   Vicki Hearn RN   Plan last modified:   Vicki Hearn RN (2019)   Next INR check:   3/12/2020   Priority:   High   Target end date:   Indefinite    Indications    Atrial fibrillation -- on Warfarin [I48.91]  Long-term (current) use of anticoagulants [Z79.01] [Z79.01]             Anticoagulation Episode Summary     INR check location:       Preferred lab:       Send INR reminders to:   [facility]    Comments:   HIPPA Infor returned. OK to  on cell [phone].  labs @ [facility] faxed there .  fax: [phone]    Goes by [employer]      Anticoagulation Care Providers     Provider Role Specialty Phone number    Clarisse Valdes MD [facility] Cardiology [phone]            See the Encounter Report to view Anticoagulation Flowsheet and Dosing Calendar (Go to Encounters tab in chart review, and find the Anticoagulation Therapy Visit)    Left message for patient with results and dosing recommendations. Asked patient to call back to report any missed doses, falls, signs and symptoms of bleeding or clotting, any changes in health, medication, or diet. Asked patient to call back with any questions or concerns.      Vicki Hearn RN

## 2020-02-20 NOTE — PROGRESS NOTES
HPI:   Mr. Manning is a 79 year old male with a past medical history including CLL in remission, CAD s/p CABG in 1995, HFrEF 2/2 ICM, atrial flutter, PVD s/p iliac artery stents and left lower extremity bypass, hypertension, diabetes, hyperlipidemia, discitis and multple admission for decompensated heart failure. Presents to clinic for CORE f/u.     At his most recent CORE visit on 1/9/20 with Dr. Sabillon, Mr. Howard was restarted on Metoprolol with suggestion for changing Lisinopril to Entresto, but this was not done due to restarting Metoprolol. Following his last CORE visit on 1/9/20, Mr. Manning was found to have a blood glucose of > 600 and was sent to the ER for a hospital admission from 1/9-1/13 for hyperglycemia. He was discharged on Lantus, Metformin, and diabetes education. Primary MD has been following diabetes management. He has since had PT/OT and a weekly home care nurse. He checks BG TID, which have been stable.     Today, Mr. Manning presents with his sister-in-law. He reports a fall yesterday, precipitated by lightheadedness after standing and walking with his walker. He fell onto his knees but did not hit his head or sustain other obvious injury. Denies extensive bruising or bleeding. His blood sugar log shows BG >120 around the time of the fall. He denies palpitations at that time, or any recent patterns of lightheadedness. Does not check his BP at home, but home care nurse dose 1x per week. Unsure about what BPs have been. Denies SOB, orthopnea, PND, or edema. Weight graph indicates approximately 10 lb weight gain since 1/13/20. He denies CP/pressure or claudication.       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)      Coronary artery disease      triple bypass      Diabetes mellitus (H)      Diabetic polyneuropathy (H)      History of blood transfusion      Hyperlipidaemia      Hypertension      Non-healing ulcer of foot (H)     Right     Noninflammatory pericardial effusion      Osteomyelitis of left foot (H)      PVD (peripheral vascular disease) (H)      Sebaceous cyst        FAMILY HISTORY:  Family History   Problem Relation Age of Onset     Cancer Mother         leukemia       SOCIAL HISTORY:  Social History     Socioeconomic History     Marital status: Single     Spouse name: None     Number of children: None     Years of education: None     Highest education level: None   Occupational History     None   Social Needs     Financial resource strain: None     Food insecurity:     Worry: None     Inability: None     Transportation needs:     Medical: None     Non-medical: None   Tobacco Use     Smoking status: Former Smoker     Packs/day: 2.00     Years: 30.00     Pack years: 60.00     Types: Cigarettes     Last attempt to quit: 1995     Years since quittin.6     Smokeless tobacco: Never Used   Substance and Sexual Activity     Alcohol use: No     Drug use: No     Sexual activity: None   Lifestyle     Physical activity:     Days per week: None     Minutes per session: None     Stress: None   Relationships     Social connections:     Talks on phone: None     Gets together: None     Attends [employer] service: None     Active member of club or organization: None     Attends meetings of clubs or organizations: None     Relationship status: None     Intimate partner violence:     Fear of current or ex partner: None     Emotionally abused: None     Physically abused: None     Forced sexual activity: None   Other Topics Concern     Parent/sibling w/ CABG, MI or angioplasty before 65F 55M? Not Asked   Social History Narrative    Lives independently with SO. 2017        CURRENT MEDICATIONS:  alcohol swab prep pads, Use to swab area of  injection/jina as directed.  aspirin (ASA) 81 MG tablet, Take 1 tablet (81 mg) by mouth daily  atorvastatin (LIPITOR) 20 MG tablet, Take 1 tablet (20 mg) by mouth daily  blood glucose (NO BRAND SPECIFIED) test strip, Use to test blood sugar 4 times daily or as directed.  blood glucose calibration (NO BRAND SPECIFIED) solution, 1 drop every 14 days Use 1 drop to calibrate blood glucose monitor every 14 days  blood glucose monitoring (NO BRAND SPECIFIED) meter device kit, Use to test blood sugar 4 times daily or as directed.  cholecalciferol 1000 UNITS TABS, Take 1,000 Units by mouth daily  citalopram (CELEXA) 20 MG tablet, Take 20 mg by mouth every evening  digoxin (LANOXIN) 125 MCG tablet, Take 1 tablet (125 mcg) by mouth daily  ferrous sulfate (IRON) 325 (65 Fe) MG tablet, Take 1 tablet (325 mg) by mouth daily  furosemide (LASIX) 20 MG tablet, Take 1 tablet (20 mg) by mouth daily  hydrocortisone (CORTIZONE-10) 1 % ointment, Apply topically 2 times daily as needed for itching  insulin glargine (LANTUS SOLOSTAR) 100 UNIT/ML pen, Inject 25 Units Subcutaneous At Bedtime Lantus Solostar Pen  melatonin 5 MG tablet, Take 5 mg by mouth At Bedtime  metFORMIN (GLUCOPHAGE-XR) 750 MG 24 hr tablet, Take 750 mg by mouth 2 times daily (with meals)  metoprolol succinate ER (TOPROL-XL) 100 MG 24 hr tablet, Take 1 tablet (100 mg) by mouth daily  nitroglycerin (NITROSTAT) 0.4 MG sublingual tablet, For chest pain place 1 tablet under the tongue every 5 minutes for 3 doses. If symptoms persist 5 minutes after 1st dose call 911.  nystatin (MYCOSTATIN) 795183 UNIT/GM external cream, Apply topically daily as needed for dry skin   order for DME, Equipment being ordered: 2 shoe  oxyCODONE IR (ROXICODONE) 5 MG tablet, Take 1-2 tablets (5-10 mg) by mouth every 4 hours as needed for pain  pantoprazole (PROTONIX) 40 MG EC tablet, Take 40 mg by mouth daily  PROVIDER DOSED MANAGED WARFARIN, COUMADIN, OUTPATIENT, Dose based on INR per Primary  "MD/Nursing Home MD.  sodium chloride (EQ SALINE NASAL SPRAY) 0.65 % nasal spray, Spray 1 spray into both nostrils daily as needed for congestion  thin (NO BRAND SPECIFIED) lancets, 1 each 4 times daily Use with lanceting device.  traZODone (DESYREL) 50 MG tablet, Take 150 mg by mouth At Bedtime  warfarin ANTICOAGULANT (COUMADIN) 5 MG tablet, 7.5 mg M/F  5 mg AOD  lisinopril (PRINIVIL/ZESTRIL) 5 MG tablet, Take 1 tablet (5 mg) by mouth daily  polyethylene glycol (MIRALAX/GLYCOLAX) Packet, Take 1 packet by mouth daily as needed     No current facility-administered medications on file prior to visit.       ROS:   Refer to HPI    EXAM:  /65 (BP Location: Right arm, Patient Position: Chair, Cuff Size: Adult Regular)   Pulse 72   Ht 1.791 m (5' 10.5\")   Wt 78.2 kg (172 lb 6.4 oz)   SpO2 99%   BMI 24.39 kg/m    GENERAL: Appears comfortable, in no acute distress.   HEENT: Eye symmetrical, no discharge or icterus bilaterally. Mucous membranes moist and without lesions.  CV: RRR, +S1S2, no murmur, rub, or gallop. JVP no visible at 90 degrees.   RESPIRATORY: Respirations regular, even, and unlabored. Lungs CTA throughout without wheezing, crackles.   GI: Soft and non distended with normoactive bowel sounds present in all quadrants. No tenderness, rebound, guarding. No hepatomegaly.   EXTREMITIES: No peripheral edema. 2+ bilateral pedal pulses.   NEUROLOGIC: Alert and oriented x 3. No focal deficits.   MUSCULOSKELETAL: No joint swelling or tenderness.   SKIN: No jaundice. No rashes or lesions.     Labs, reviewed with patient in clinic today:  CBC RESULTS:  Lab Results   Component Value Date    WBC 13.2 (H) 01/09/2020    RBC 3.81 (L) 01/09/2020    HGB 12.0 (L) 01/09/2020    HCT 34.1 (L) 01/09/2020    MCV 90 01/09/2020    MCH 31.5 01/09/2020    MCHC 35.2 01/09/2020    RDW 13.6 01/09/2020     (L) 01/09/2020       CMP RESULTS:  Lab Results   Component Value Date     02/20/2020    POTASSIUM 4.3 02/20/2020    " CHLORIDE 105 02/20/2020    CO2 26 02/20/2020    ANIONGAP 7 02/20/2020     (H) 02/20/2020    BUN 33 (H) 02/20/2020    CR 1.22 02/20/2020    GFRESTIMATED 57 (L) 02/20/2020    GFRESTBLACK 66 02/20/2020    JUSTINO 9.7 02/20/2020    BILITOTAL 0.6 10/03/2018    ALBUMIN 2.0 (L) 10/03/2018    ALKPHOS 73 10/03/2018    ALT 14 10/03/2018    AST 15 10/03/2018        INR RESULTS:  Lab Results   Component Value Date    INR 2.45 (H) 02/20/2020       Lab Results   Component Value Date    MAG 1.8 09/22/2018     Lab Results   Component Value Date    NTBNPI 5,223 (H) 10/08/2018     Lab Results   Component Value Date    NTBNP 22,172 (H) 02/09/2018       Diagnostics:  12/18/19 L U/S  FINDINGS: The left surgical arterial bypass graft is patent. No  velocity elevation to suggest stenosis. Velocities are diminished in  the distal graft, decreased to 32/2 cm/second, though previously 33/0  cm/second. No visible stenosis.                                                                      IMPRESSION: Patent left lower extremity surgical arterial bypass  graft. No evidence of stenosis.      10/5/18 TTE  Interpretation Summary     The visual ejection fraction is estimated at 35-40%.  There is mild-moderate global hypokinesia of the left ventricle.  Mildly decreased right ventricular systolic function     In comparison with the previous study the LVEF has improved slightly.  _____________________________________________________________________________  __        Left Ventricle  The left ventricle is normal in size. The visual ejection fraction is  estimated at 35-40%. There is mild-moderate global hypokinesia of the left  ventricle.     Right Ventricle  Mildly decreased right ventricular systolic function.     Atria  The left atrium is mild to moderately dilated. The right atrium is mild to  moderately dilated. There is no atrial shunt seen.        Mitral Valve  The mitral valve leaflets are mildly thickened. There is trace  mitral  regurgitation.     Tricuspid Valve  The tricuspid valve is not well visualized, but is grossly normal. There is  trace tricuspid regurgitation. Right ventricular systolic pressure could not  be approximated due to inadequate tricuspid regurgitation.     Aortic Valve  There is moderate trileaflet aortic sclerosis. There is trace aortic  regurgitation. No hemodynamically significant valvular aortic stenosis.     Pulmonic Valve  The pulmonic valve is not well seen, but is grossly normal. There is mild (1+)  pulmonic valvular regurgitation.     Vessels  Borderline aortic root dilatation. Normal size ascending aorta.    Assessment and Plan:   Mr. Manning is a 79 year old male with chronic system heart failure 2/2 ischemic cardiomyopathy who presents to [facility] for follow up. His last echo in 2018 shows EF 35-40%. Today we were unable to change his Lisinopril to Entresto due to a fall yesterday concerning for low BP. We decided to set up CardioCOM for blood pressure and weight monitoring from home, as his weekly homecare nurse will be ending this week. We can make adjustments the next visit to Entresto if blood pressures remain stable.     # Chronic systolic heart failure/HFrEF secondary to NICM    Stage C. NYHA Class III.    Fluid status: euvolemic  ACEi/ARB/ARNI: yes: Lisinopril 5mg daily, plan to change to Entresto if BPs remain stable/no further falls  BB: yes: metoprolol 100mg QD  Aldosterone antagonist: deferred while other medical therapy is prioritized  SCD prophylaxis: None: EF 35%  NSAID use: contraindicated  Sleep apnea evaluation: Denies daytime sleepiness.  Remote monitoring: Plan to set up CardioCOM today     # CAD s/p CABG  # PAD s/p iliac stenting and LE bypass  -no symptoms to suggest angina, he is on asa and statin (defer high potency to cardiologist); he has stable claudication     # Atrial flutter  -he is on BB and digoxin and warfarin      Follow up in one month with Dr. Valdes. Will monitor  home BPs and weight with CardioCOM.       Pinky Hampton, RN, DNP Student      I saw the patient with the student and I agree with student's documentation and findings. The note above reflects our joint assessment and plan.     Dai Sabillon DNP, NP-C, [facility]  2/20/2020        30 minutes spent face-to-face with patient, >50% in counseling and/or coordination of care as described above    FABY ZURITA

## 2020-03-10 ENCOUNTER — TELEPHONE (OUTPATIENT)
Dept: CARDIOLOGY | Facility: CLINIC | Age: 80
End: 2020-03-10

## 2020-03-10 NOTE — TELEPHONE ENCOUNTER
[facility] Call Center    Phone Message    May a detailed message be left on voicemail: yes     Reason for Call: Other: pt would like to inform aleyda he has not received his BP monitor yet, and he would like a call back thanks     Action Taken: Message routed to:  [facility] heart    Travel Screening: Not Applicable

## 2020-03-11 NOTE — TELEPHONE ENCOUNTER
Confirmed with Ronaldo Trent that Cardiocom scale and BP cuff was delivered to patients address on 2/27/20 via [employer]. Called patient to let him know we received confirmation from [employer] that it was delivered. Pt checked with the from office of his apartment building and they have a package for him there. He will  the package today. If it is not the scale he will call clinic back.  Nevin Pierre RN

## 2020-03-12 ENCOUNTER — DOCUMENTATION ONLY (OUTPATIENT)
Dept: ORTHOPEDICS | Facility: CLINIC | Age: 80
End: 2020-03-12

## 2020-03-12 NOTE — PROGRESS NOTES
S: Pt seen at Main lab with his daughter for starting the process for the first replacement socket for his BK prosthesis that he received back in February of 2019. He reports that he wants to drive again and may be walking through grassy areas but primarily on sidewalks and flat surfaces. This indicates he will be a High K-2 ambulatory. At 79 yrs old he reports being retired.  He lost his limb due to Diabetes.  Functional limitations: He is unable to walk without his RT lower limb post amputation and uses a wheelchair to get around and to my office if he does not have a prosthesis, otherwise he uses his existing prosthesis and a walker.  Functional Capabilities: He was able to walk about freely with no assistive devises prior to amputation and now uses a standard walker with his prosthesis to do normal daily activities such as shopping or getting around his home.  Functional level: As mentioned above K-2 level seems appropriate for his lifestyle of sidewalks, flat surfaces and driving.  Condition of limb: Good with well healed incision and no sores, skin looks good condition.  Condition of Prosthesis: He currently has a functioning prosthesis but pads have been added to shrink up the socket as it is his 1st socket post amputation He is now in 10 ply sock along with the padding in the socket and he feels unstable and his having issues with the proximal trimlines of the socket since he is falling in the socket too deep due to volume loss of the residual limb ( which is normal and natural).  Past experience with prosthesis: He has good Hx of prosthetic use, he uses the prosthesis daily for between 6-10 hrs per day.  Motivation to use a prosthesis: High motivation with regards to being able to continue his lifestyle.  Physical exam: I got his height at 6' tall and 180# body weight. Good strength and ROM.  O: I hear the patient report that he agrees a new socket is most appropriate as we cannot make the current one  comfortable with any combination of socks or pads. I see his bar to floor height is 49cm, foot size 27cm. He measured 31cm distal. Normal bench alignment.  A: For the replacement socket we will stick with the Ossur Dermo liner 6mm thick locking with bulldog style lock. Liner size 30cm. P: We will see him again when ready for casting and he will also be going to PT for required documentation. I will order the locking liners [id number]. G: The goal will be to provide an appropriate replacement socket and supplies for his weight and activity level of K-2.  Electronically signed Gene Thompson , LPO

## 2020-03-13 ENCOUNTER — ANTICOAGULATION THERAPY VISIT (OUTPATIENT)
Dept: ANTICOAGULATION | Facility: CLINIC | Age: 80
End: 2020-03-13

## 2020-03-13 DIAGNOSIS — I48.0 PAROXYSMAL ATRIAL FIBRILLATION (H): ICD-10-CM

## 2020-03-13 DIAGNOSIS — I48.92 ATRIAL FLUTTER, UNSPECIFIED TYPE (H): Primary | ICD-10-CM

## 2020-03-13 DIAGNOSIS — Z79.01 LONG TERM CURRENT USE OF ANTICOAGULANT THERAPY: ICD-10-CM

## 2020-03-13 LAB — INR PPP: 1.7 (ref 0.9–1.1)

## 2020-03-13 NOTE — TELEPHONE ENCOUNTER
[facility] Call Center    Phone Message    May a detailed message be left on voicemail: yes     Reason for Call: Other: pt calling because he has questions regarding the scale and the BP monitor. Please call the pt back to discuss.       Action Taken: Message routed to:  [facility] cardiology    Travel Screening: Not Applicable

## 2020-03-20 ENCOUNTER — ANTICOAGULATION THERAPY VISIT (OUTPATIENT)
Dept: ANTICOAGULATION | Facility: CLINIC | Age: 80
End: 2020-03-20

## 2020-03-20 DIAGNOSIS — Z79.01 LONG TERM CURRENT USE OF ANTICOAGULANT THERAPY: ICD-10-CM

## 2020-03-20 DIAGNOSIS — I48.0 PAROXYSMAL ATRIAL FIBRILLATION (H): ICD-10-CM

## 2020-03-20 LAB
CAPILLARY BLOOD COLLECTION: NORMAL
INR BLD: 1.8 (ref 0.86–1.14)

## 2020-03-20 PROCEDURE — 36416 COLLJ CAPILLARY BLOOD SPEC: CPT | Performed by: INTERNAL MEDICINE

## 2020-03-20 PROCEDURE — 85610 PROTHROMBIN TIME: CPT | Mod: QW | Performed by: INTERNAL MEDICINE

## 2020-03-20 NOTE — PROGRESS NOTES
ANTICOAGULATION FOLLOW-UP CLINIC VISIT    Patient Name:  Tad Manning  Date:  3/20/2020  Contact Type:  Telephone    SUBJECTIVE:         OBJECTIVE    INR Point of Care   Date Value Ref Range Status   2020 1.8 (H) 0.86 - 1.14 Final     Comment:     This test is intended for monitoring Coumadin therapy.  Results are not   accurate in patients with prolonged INR due to factor deficiency.         ASSESSMENT / PLAN  INR assessment SUB    Recheck INR In: 2 WEEKS    INR Location Clinic      Anticoagulation Summary  As of 3/20/2020    INR goal:   2.0-3.0   TTR:   63.2 % (11.8 mo)   INR used for dosin.8! (3/20/2020)   Warfarin maintenance plan:   7.5 mg (5 mg x 1.5) every Mon, Fri; 5 mg (5 mg x 1) all other days   Full warfarin instructions:   3/20: 10 mg; Otherwise 7.5 mg every Mon, Fri; 5 mg all other days   Weekly warfarin total:   40 mg   Plan last modified:   Vicki Hearn RN (2019)   Next INR check:   4/3/2020   Priority:   High   Target end date:   Indefinite    Indications    Atrial fibrillation -- on Warfarin [I48.91]  Long-term (current) use of anticoagulants [Z79.01] [Z79.01]             Anticoagulation Episode Summary     INR check location:       Preferred lab:       Send INR reminders to:   [facility]    Comments:   HIPPA Infor returned. OK to  on cell [phone].  labs @ [facility] faxed there .  fax: [phone]    Goes by Hima      Anticoagulation Care Providers     Provider Role Specialty Phone number    Clarisse Valdes MD [facility] [phone]            See the Encounter Report to view Anticoagulation Flowsheet and Dosing Calendar (Go to Encounters tab in chart review, and find the Anticoagulation Therapy Visit)  Left message for patient with results and dosing recommendations. Asked patient to call back to report any missed doses, falls, signs and symptoms of bleeding or clotting, any changes in health, medication, or diet. Asked  patient to call back with any questions or concerns.      Chelsea Bland RN

## 2020-04-03 ENCOUNTER — ANTICOAGULATION THERAPY VISIT (OUTPATIENT)
Dept: ANTICOAGULATION | Facility: CLINIC | Age: 80
End: 2020-04-03

## 2020-04-03 DIAGNOSIS — Z79.01 LONG TERM CURRENT USE OF ANTICOAGULANT THERAPY: ICD-10-CM

## 2020-04-03 DIAGNOSIS — I48.92 ATRIAL FLUTTER, UNSPECIFIED TYPE (H): ICD-10-CM

## 2020-04-03 DIAGNOSIS — I48.0 PAROXYSMAL ATRIAL FIBRILLATION (H): ICD-10-CM

## 2020-04-03 LAB — INR PPP: 2.23 (ref 0.86–1.14)

## 2020-04-03 PROCEDURE — 85610 PROTHROMBIN TIME: CPT | Performed by: INTERNAL MEDICINE

## 2020-04-03 PROCEDURE — 36415 COLL VENOUS BLD VENIPUNCTURE: CPT | Performed by: INTERNAL MEDICINE

## 2020-04-03 NOTE — PROGRESS NOTES
ANTICOAGULATION FOLLOW-UP CLINIC VISIT    Patient Name:  Tad Manning  Date:  4/3/2020  Contact Type:  Telephone    SUBJECTIVE:         OBJECTIVE    INR   Date Value Ref Range Status   2020 2.23 (H) 0.86 - 1.14 Final       ASSESSMENT / PLAN  INR assessment THER    Recheck INR In: 3 WEEKS    INR Location Clinic      Anticoagulation Summary  As of 4/3/2020    INR goal:   2.0-3.0   TTR:   61.3 % (11.8 mo)   INR used for dosin.23 (4/3/2020)   Warfarin maintenance plan:   7.5 mg (5 mg x 1.5) every Mon, Wed, Fri; 5 mg (5 mg x 1) all other days   Full warfarin instructions:   7.5 mg every Mon, Wed, Fri; 5 mg all other days   Weekly warfarin total:   42.5 mg   Plan last modified:   Chelsea Bland RN (4/3/2020)   Next INR check:   2020   Priority:   High   Target end date:   Indefinite    Indications    Atrial fibrillation -- on Warfarin [I48.91]  Long-term (current) use of anticoagulants [Z79.01] [Z79.01]             Anticoagulation Episode Summary     INR check location:       Preferred lab:       Send INR reminders to:   [facility]    Comments:   HIPPA Infor returned. OK to  on cell [phone].  labs @ [facility] faxed there .  fax: [phone]    Goes by Hima      Anticoagulation Care Providers     Provider Role Specialty Phone number    Clarisse Valdes MD Responsible Cardiology [phone]            See the Encounter Report to view Anticoagulation Flowsheet and Dosing Calendar (Go to Encounters tab in chart review, and find the Anticoagulation Therapy Visit)    Spoke with patient.    Chelsea Bland RN

## 2020-04-24 ENCOUNTER — ANTICOAGULATION THERAPY VISIT (OUTPATIENT)
Dept: ANTICOAGULATION | Facility: CLINIC | Age: 80
End: 2020-04-24

## 2020-04-24 DIAGNOSIS — I48.92 ATRIAL FLUTTER, UNSPECIFIED TYPE (H): ICD-10-CM

## 2020-04-24 DIAGNOSIS — Z79.01 LONG TERM CURRENT USE OF ANTICOAGULANT THERAPY: ICD-10-CM

## 2020-04-24 DIAGNOSIS — I48.0 PAROXYSMAL ATRIAL FIBRILLATION (H): ICD-10-CM

## 2020-04-24 LAB
CAPILLARY BLOOD COLLECTION: NORMAL
INR BLD: 2.9 (ref 0.86–1.14)

## 2020-04-24 PROCEDURE — 85610 PROTHROMBIN TIME: CPT | Mod: QW | Performed by: INTERNAL MEDICINE

## 2020-04-24 PROCEDURE — 36416 COLLJ CAPILLARY BLOOD SPEC: CPT | Performed by: INTERNAL MEDICINE

## 2020-04-24 NOTE — PROGRESS NOTES
ANTICOAGULATION FOLLOW-UP CLINIC VISIT    Patient Name:  Tad Manning  Date:  2020  Contact Type:  Telephone    SUBJECTIVE:         OBJECTIVE    INR Point of Care   Date Value Ref Range Status   2020 2.9 (H) 0.86 - 1.14 Final     Comment:     This test is intended for monitoring Coumadin therapy.  Results are not   accurate in patients with prolonged INR due to factor deficiency.         ASSESSMENT / PLAN  INR assessment THER    Recheck INR In: 3 WEEKS    INR Location Clinic      Anticoagulation Summary  As of 2020    INR goal:   2.0-3.0   TTR:   65.2 % (11.8 mo)   INR used for dosin.9 (2020)   Warfarin maintenance plan:   7.5 mg (5 mg x 1.5) every Mon, Wed, Fri; 5 mg (5 mg x 1) all other days   Full warfarin instructions:   7.5 mg every Mon, Wed, Fri; 5 mg all other days   Weekly warfarin total:   42.5 mg   No change documented:   Yoselin Hunter RN   Plan last modified:   Chelsea Bland RN (4/3/2020)   Next INR check:   2020   Priority:   High   Target end date:   Indefinite    Indications    Atrial fibrillation -- on Warfarin [I48.91]  Long-term (current) use of anticoagulants [Z79.01] [Z79.01]             Anticoagulation Episode Summary     INR check location:       Preferred lab:       Send INR reminders to:   [facility]    Comments:   HIPPA Infor returned. OK to  on cell [phone].  labs @ [facility] faxed there .  fax: [phone]    Goes by Hima      Anticoagulation Care Providers     Provider Role Specialty Phone number    Clarisse Valdes MD Responsible Cardiology [phone]            See the Encounter Report to view Anticoagulation Flowsheet and Dosing Calendar (Go to Encounters tab in chart review, and find the Anticoagulation Therapy Visit)    Left message for patient with results and dosing recommendations. Asked patient to call back to report any missed doses, falls, signs and symptoms of bleeding or clotting, any changes in  health, medication, or diet. Asked patient to call back with any questions or concerns.  On message, suggested Hima eat a few more servings of green vegetables if he is able to.  If he is not able to eat more greens, his warfarin dose could be slightly reduced.     Yoselin Hunter RN

## 2020-05-11 DIAGNOSIS — I48.91 A-FIB (H): ICD-10-CM

## 2020-05-11 DIAGNOSIS — I48.91 A-FIB (H): Primary | ICD-10-CM

## 2020-05-11 RX ORDER — WARFARIN SODIUM 5 MG/1
TABLET ORAL
Qty: 90 TABLET | Refills: 5 | Status: SHIPPED | OUTPATIENT
Start: 2020-05-11 | End: 2020-05-11

## 2020-05-11 RX ORDER — WARFARIN SODIUM 5 MG/1
TABLET ORAL
Qty: 135 TABLET | Refills: 1 | Status: SHIPPED | OUTPATIENT
Start: 2020-05-11 | End: 2021-01-11 | Stop reason: ALTCHOICE

## 2020-05-13 RX ORDER — LISINOPRIL 5 MG/1
TABLET ORAL
Status: ON HOLD | COMMUNITY
Start: 2020-04-19 | End: 2021-06-20

## 2020-05-13 RX ORDER — FERROUS SULFATE 324(65)MG
TABLET, DELAYED RELEASE (ENTERIC COATED) ORAL
Status: ON HOLD | COMMUNITY
Start: 2020-02-24 | End: 2021-06-25

## 2020-05-13 RX ORDER — WARFARIN SODIUM 5 MG/1
TABLET ORAL
COMMUNITY
Start: 2018-11-27 | End: 2021-01-11 | Stop reason: ALTCHOICE

## 2020-05-13 RX ORDER — METHYLPREDNISOLONE SODIUM SUCCINATE 125 MG/2ML
1 INJECTION, POWDER, FOR SOLUTION INTRAMUSCULAR; INTRAVENOUS 4 TIMES DAILY
COMMUNITY
Start: 2020-01-13

## 2020-05-15 ENCOUNTER — ALLIED HEALTH/NURSE VISIT (OUTPATIENT)
Dept: CARDIOLOGY | Facility: CLINIC | Age: 80
End: 2020-05-15
Attending: NURSE PRACTITIONER
Payer: MEDICARE

## 2020-05-15 DIAGNOSIS — I50.22 CHRONIC SYSTOLIC HEART FAILURE (H): Primary | ICD-10-CM

## 2020-05-15 PROCEDURE — 99454 REM MNTR PHYSIOL PARAM 16-30: CPT

## 2020-05-15 PROCEDURE — 99207 C REMOTE MONITOR INITIAL; DAILY RCRD ALERT, EA 30 DAYS: CPT | Mod: ZP | Performed by: NURSE PRACTITIONER

## 2020-05-15 NOTE — Clinical Note
30 days of remote monitoring completed.  Please enter your documentation (include in your documentation billing dates of May 1 through May 30), sign orders and electronically sign/close visit

## 2020-05-18 ENCOUNTER — VIRTUAL VISIT (OUTPATIENT)
Dept: CARDIOLOGY | Facility: CLINIC | Age: 80
End: 2020-05-18
Attending: INTERNAL MEDICINE
Payer: MEDICARE

## 2020-05-18 ENCOUNTER — ANTICOAGULATION THERAPY VISIT (OUTPATIENT)
Dept: ANTICOAGULATION | Facility: CLINIC | Age: 80
End: 2020-05-18

## 2020-05-18 VITALS
HEART RATE: 67 BPM | WEIGHT: 173.3 LBS | DIASTOLIC BLOOD PRESSURE: 59 MMHG | SYSTOLIC BLOOD PRESSURE: 122 MMHG | BODY MASS INDEX: 24.51 KG/M2

## 2020-05-18 DIAGNOSIS — I48.0 PAROXYSMAL ATRIAL FIBRILLATION (H): ICD-10-CM

## 2020-05-18 DIAGNOSIS — Z79.01 LONG TERM CURRENT USE OF ANTICOAGULANT THERAPY: ICD-10-CM

## 2020-05-18 DIAGNOSIS — I10 ESSENTIAL HYPERTENSION: ICD-10-CM

## 2020-05-18 DIAGNOSIS — I73.9 PAD (PERIPHERAL ARTERY DISEASE) (H): Primary | ICD-10-CM

## 2020-05-18 DIAGNOSIS — I25.83 CORONARY ARTERY DISEASE DUE TO LIPID RICH PLAQUE: ICD-10-CM

## 2020-05-18 DIAGNOSIS — I25.5 ISCHEMIC CARDIOMYOPATHY: ICD-10-CM

## 2020-05-18 DIAGNOSIS — I25.10 CORONARY ARTERY DISEASE DUE TO LIPID RICH PLAQUE: ICD-10-CM

## 2020-05-18 LAB
CHOLEST SERPL-MCNC: 121 MG/DL
HDLC SERPL-MCNC: 36 MG/DL
INR PPP: 2.47 (ref 0.86–1.14)
LDLC SERPL CALC-MCNC: 60 MG/DL
NONHDLC SERPL-MCNC: 85 MG/DL
TRIGL SERPL-MCNC: 123 MG/DL

## 2020-05-18 PROCEDURE — 80061 LIPID PANEL: CPT | Performed by: INTERNAL MEDICINE

## 2020-05-18 PROCEDURE — 99442: CPT | Performed by: INTERNAL MEDICINE

## 2020-05-18 PROCEDURE — 85610 PROTHROMBIN TIME: CPT | Performed by: INTERNAL MEDICINE

## 2020-05-18 PROCEDURE — 36415 COLL VENOUS BLD VENIPUNCTURE: CPT | Performed by: INTERNAL MEDICINE

## 2020-05-18 ASSESSMENT — PAIN SCALES - GENERAL: PAINLEVEL: NO PAIN (0)

## 2020-05-18 NOTE — PATIENT INSTRUCTIONS
Patient Instructions:  It was a pleasure to see you in the cardiology clinic today.      If you have any questions, call  Kizzy Oakes RN, at [phone].  Press Option #1 for the [facility], and then press Option #4  We are encouraging the use of [employer] to communicate with your HealthCare Provider    Please follow up with Dr. Valdes in 3 months      If you have an urgent need after hours (8:00 am to 4:30 pm) please call [phone] and ask for the cardiology fellow on call.

## 2020-05-18 NOTE — PROGRESS NOTES
"Tad Manning is a 79 year old male who is being evaluated via a billable telephone visit.      The patient has been notified of following:     \"This telephone visit will be conducted via a call between you and your physician/provider. We have found that certain health care needs can be provided without the need for a physical exam.  This service lets us provide the care you need with a short phone conversation.  If a prescription is necessary we can send it directly to your pharmacy.  If lab work is needed we can place an order for that and you can then stop by our lab to have the test done at a later time.    Telephone visits are billed at different rates depending on your insurance coverage. During this emergency period, for some insurers they may be billed the same as an in-person visit.  Please reach out to your insurance provider with any questions.    If during the course of the call the physician/provider feels a telephone visit is not appropriate, you will not be charged for this service.\"    Patient has given verbal consent for Telephone visit?  Yes    What phone number would you like to be contacted at? [phone]    How would you like to obtain your AVS? Mail a copy      History:  79 year old male with HF, CAD    Has been monitoring weight and BP using [employer] Cardiocom devices   No orthopnea or PND, chest pain at rest, syncope  Stable dyspnea on exertion  No recent hospitalization for HF/MI/stroke  Waiting for R leg prosthesis      Current Outpatient Medications   Medication Sig Dispense Refill     alcohol swab prep pads Use to swab area of injection/jina as directed. 100 each 3     aspirin (ASA) 81 MG tablet Take 1 tablet (81 mg) by mouth daily       atorvastatin (LIPITOR) 20 MG tablet Take 1 tablet (20 mg) by mouth daily 90 tablet 0     blood glucose (NO BRAND SPECIFIED) test strip Use to test blood sugar 4 times daily or as directed. 100 strip 6     blood glucose calibration (NO BRAND " SPECIFIED) solution 1 drop every 14 days Use 1 drop to calibrate blood glucose monitor every 14 days 1 Bottle 3     blood glucose monitoring (NO BRAND SPECIFIED) meter device kit Use to test blood sugar 4 times daily or as directed. 1 kit 0     cholecalciferol 1000 UNITS TABS Take 1,000 Units by mouth daily 30 tablet      citalopram (CELEXA) 20 MG tablet Take 20 mg by mouth every evening       digoxin (LANOXIN) 125 MCG tablet Take 1 tablet (125 mcg) by mouth daily 90 tablet 3     Ferrous Sulfate 324 (65 Fe) MG TBEC TK 1 T PO D       furosemide (LASIX) 20 MG tablet Take 1 tablet (20 mg) by mouth daily 90 tablet 3     hydrocortisone (CORTIZONE-10) 1 % ointment Apply topically 2 times daily as needed for itching       insulin glargine (LANTUS SOLOSTAR) 100 UNIT/ML pen Inject 25 Units Subcutaneous At Bedtime Lantus Solostar Pen 15 mL 0     lisinopril (ZESTRIL) 5 MG tablet        Magnesium Oxide 250 MG TABS Take 375 mg by mouth       melatonin 5 MG tablet Take 5 mg by mouth At Bedtime       metFORMIN (GLUCOPHAGE-XR) 750 MG 24 hr tablet Take 750 mg by mouth 2 times daily (with meals)       metoprolol succinate ER (TOPROL-XL) 100 MG 24 hr tablet Take 1 tablet (100 mg) by mouth daily 90 tablet 3     nitroglycerin (NITROSTAT) 0.4 MG sublingual tablet For chest pain place 1 tablet under the tongue every 5 minutes for 3 doses. If symptoms persist 5 minutes after 1st dose call 911. 25 tablet      order for DME Equipment to be ordered:  Scale- Qty 1  Commander Flex - Qty. 1  Pulse-Oximeter - Qty. 1  Use as directed 1 Units 0     order for DME Equipment being ordered: DH2 shoe 1 Device 0     oxyCODONE IR (ROXICODONE) 5 MG tablet Take 1-2 tablets (5-10 mg) by mouth every 4 hours as needed for pain 20 tablet 0     polyethylene glycol (MIRALAX/GLYCOLAX) Packet Take 1 packet by mouth daily as needed        PROVIDER DOSED MANAGED WARFARIN, COUMADIN, OUTPATIENT Dose based on INR per Primary MD/Nursing Home MD.       sodium chloride (EQ  SALINE NASAL SPRAY) 0.65 % nasal spray Spray 1 spray into both nostrils daily as needed for congestion 1 Bottle 1     thin (NO BRAND SPECIFIED) lancets 1 each 4 times daily Use with lanceting device. 100 each 1     traZODone (DESYREL) 50 MG tablet Take 150 mg by mouth At Bedtime       ULTICARE SHORT 31G X 8 MM insulin pen needle        warfarin ANTICOAGULANT (COUMADIN) 5 MG tablet 7.5mg MWF, and 5mg TuThSatSun, or as directed by the Medication Monitoring Clinic at the [facility].       warfarin ANTICOAGULANT (COUMADIN) 5 MG tablet TAKE 1 TO 1.5 TABLETS BY MOUTH EVERY DAY OR AS DIRECTED BY THE COUMADIN CLINIC. 135 tablet 1     lisinopril (PRINIVIL/ZESTRIL) 5 MG tablet Take 1 tablet (5 mg) by mouth daily 90 tablet 3     nystatin (MYCOSTATIN) 219431 UNIT/GM external cream Apply topically daily as needed for dry skin        pantoprazole (PROTONIX) 40 MG EC tablet Take 40 mg by mouth daily         Allergies - reviewed  Past history -HFrEF, CAD  Social history - reviewed  Family history -reviewed  ROS: as above, otherwise negative     Exam:   Patient in no apparent distress  Speech is normal  Weight 173-175 lb  /67 mmHg  HR 60s  Abdominal girth and weight stable per patient    Data:  Recent cardiac investigations - reviewed  Labs - reviewed    Echo 10/2018  Left ventricular function moderately reduced, LVEF 35-40%.  Mildly decreased right ventricular systolic function     In comparison with the previous study the LVEF has improved slightly.    Assessment and Plan:  79 year old male with    1. HFrEF, Stage C, NYHA 3  2. CAD  s/p CABG L-LAD, SVG-OM1, SVG-D1 in 1995. s/p LHc in 3/2018 and PCI to mLAD. Not on ASA 2/2 already being   3.  Paroxysmal atrial fibrillation  4. Essential Hypertension  5. Dyslipidemia  6. DM Type II  7. PAD with LE stents and bypass   8. R BKA    Advised compliance with medications   Encouraged to remain active at home  Will have clinic RN follow up with him on the Cardiocom monitoring  COVID 19  precautions reinforced  F/u in 3 months - will evaluate for starting Entresto    Phone call and chart review duration: 20 minutes    Clarisse Valdes MD, MS  Staff Cardiologist, [facility]   Pager: [phone]

## 2020-05-18 NOTE — PROGRESS NOTES
ANTICOAGULATION FOLLOW-UP CLINIC VISIT    Patient Name:  Tad Manning  Date:  2020  Contact Type:  Telephone    SUBJECTIVE:  Patient Findings     Positives:   Change in medications (Started Magnesium)             OBJECTIVE    Recent labs: (last 7 days)     20  1117   INR 2.47*       ASSESSMENT / PLAN  INR assessment THER    Recheck INR In: 6 WEEKS    INR Location Clinic      Anticoagulation Summary  As of 2020    INR goal:   2.0-3.0   TTR:   68.1 % (11.8 mo)   INR used for dosin.47 (2020)   Warfarin maintenance plan:   7.5 mg (5 mg x 1.5) every Mon, Wed, Fri; 5 mg (5 mg x 1) all other days   Full warfarin instructions:   7.5 mg every Mon, Wed, Fri; 5 mg all other days   Weekly warfarin total:   42.5 mg   Plan last modified:   Chelsea Bland RN (4/3/2020)   Next INR check:   2020   Priority:   High   Target end date:   Indefinite    Indications    Atrial fibrillation -- on Warfarin [I48.91]  Long-term (current) use of anticoagulants [Z79.01] [Z79.01]             Anticoagulation Episode Summary     INR check location:       Preferred lab:       Send INR reminders to:   [facility]    Comments:   HIPPA Infor returned. OK to  on cell [phone].  labs @ [facility] faxed there .  fax: [phone]    Goes by [facility]      Anticoagulation Care Providers     Provider Role Specialty Phone number    Clarisse Valdes MD Responsible Cardiology [phone]            See the Encounter Report to view Anticoagulation Flowsheet and Dosing Calendar (Go to Encounters tab in chart review, and find the Anticoagulation Therapy Visit)    Spoke with patient.    Chelsea Bland RN

## 2020-05-20 NOTE — PROGRESS NOTES
Date (CST)           Weight (lbs)  5/30/2020 11:13 .3 Peripheral  5/29/2020 10:42 .7 Peripheral  5/28/2020 11:04 .7 Peripheral  5/27/2020 11:00 .6 Peripheral  5/26/2020 11:02 .6 Peripheral  5/25/2020 11:20 .4 Peripheral  5/24/2020 12:50 .8 Peripheral  5/22/2020 12:57 .8 Peripheral  5/21/2020 10:43 .7 Peripheral  5/20/2020 3:33 .5 Peripheral   5/19/2020 3:14 .8   5/17/2020 12:10 .4   5/14/2020 2:48 .5   5/12/2020 12:18 .2   5/10/2020 2:59 .8   5/8/2020 1:36 .8   5/6/2020 1:33 .5

## 2020-06-14 ENCOUNTER — ALLIED HEALTH/NURSE VISIT (OUTPATIENT)
Dept: CARDIOLOGY | Facility: CLINIC | Age: 80
End: 2020-06-14
Attending: NURSE PRACTITIONER
Payer: MEDICARE

## 2020-06-14 DIAGNOSIS — Z53.9 ERRONEOUS ENCOUNTER--DISREGARD: Primary | ICD-10-CM

## 2020-07-14 ENCOUNTER — ALLIED HEALTH/NURSE VISIT (OUTPATIENT)
Dept: CARDIOLOGY | Facility: CLINIC | Age: 80
End: 2020-07-14
Attending: NURSE PRACTITIONER
Payer: MEDICARE

## 2020-07-14 DIAGNOSIS — Z53.9 ERRONEOUS ENCOUNTER--DISREGARD: Primary | ICD-10-CM

## 2020-08-05 NOTE — PROGRESS NOTES
Called [facility] as he hasn't been using his Cardiocom scale since 6.25. Reports he moved and has had trouble getting his scale to work. Gave number for technical support and he will call them to troubleshoot. Will not bill for this month as has not used scale.   Verónica Jarquin RN   This encounter was opened in error. Please disregard.

## 2020-08-11 ENCOUNTER — TELEPHONE (OUTPATIENT)
Dept: ANTICOAGULATION | Facility: CLINIC | Age: 80
End: 2020-08-11

## 2020-08-13 ENCOUNTER — APPOINTMENT (OUTPATIENT)
Dept: CARDIOLOGY | Facility: CLINIC | Age: 80
End: 2020-08-13
Attending: NURSE PRACTITIONER
Payer: MEDICARE

## 2020-08-26 ENCOUNTER — VIRTUAL VISIT (OUTPATIENT)
Dept: CARDIOLOGY | Facility: CLINIC | Age: 80
End: 2020-08-26
Attending: INTERNAL MEDICINE
Payer: MEDICARE

## 2020-08-26 DIAGNOSIS — Z79.899 ENCOUNTER FOR LONG-TERM (CURRENT) USE OF MEDICATIONS: Primary | ICD-10-CM

## 2020-08-26 DIAGNOSIS — I25.5 ISCHEMIC CARDIOMYOPATHY: ICD-10-CM

## 2020-08-26 DIAGNOSIS — I10 ESSENTIAL HYPERTENSION: ICD-10-CM

## 2020-08-26 DIAGNOSIS — I73.9 PAD (PERIPHERAL ARTERY DISEASE) (H): ICD-10-CM

## 2020-08-26 PROCEDURE — 99442 ZZC PHYSICIAN TELEPHONE EVALUATION 11-20 MIN: CPT | Mod: ZP | Performed by: INTERNAL MEDICINE

## 2020-08-26 RX ORDER — LISINOPRIL 5 MG/1
5 TABLET ORAL DAILY
Qty: 90 TABLET | Refills: 3 | Status: ON HOLD | OUTPATIENT
Start: 2020-08-26 | End: 2021-06-25

## 2020-08-26 NOTE — PATIENT INSTRUCTIONS
Patient Instructions:  It was a pleasure to see you in the cardiology clinic today.      If you have any questions, call  Jeaneth Montero RN, at [phone].  Press Option #1 for the [facility], and then press Option #4  We are encouraging the use of [employer] to communicate with your HealthCare Provider    Note the new medications: none  Lisinopril was renewed at [facility]  Stop the following medications: none    The results from today include: none  Please follow up with Dr. Clarisse Valdes in 4 months with labs      If you have an urgent need after hours (8:00 am to 4:30 pm) please call [phone] and ask for the cardiology fellow on call.

## 2020-08-26 NOTE — LETTER
8/26/2020      RE: Tad Manning  [address]  [place] [address]       Dear Colleague,    Thank you for the opportunity to participate in the care of your patient, Tad Manning, at the [facility]. Please see a copy of my visit note below.    Tad Manning is a 79 year old male who is being evaluated via a billable telephone visit.      History:  79 year old male with HF, CAD    Has moved to [place], has not set up [employer] Cardiocom devices   No orthopnea or PND, chest pain at rest, syncope  Stable dyspnea on exertion  Walks to get paper and mail - uses his walker and wears R leg prosthesis  No recent hospitalization for HF/MI/stroke      Current Outpatient Medications   Medication Sig Dispense Refill     alcohol swab prep pads Use to swab area of injection/jina as directed. 100 each 3     aspirin (ASA) 81 MG tablet Take 1 tablet (81 mg) by mouth daily       atorvastatin (LIPITOR) 20 MG tablet Take 1 tablet (20 mg) by mouth daily 90 tablet 0     blood glucose (NO BRAND SPECIFIED) test strip Use to test blood sugar 4 times daily or as directed. 100 strip 6     blood glucose calibration (NO BRAND SPECIFIED) solution 1 drop every 14 days Use 1 drop to calibrate blood glucose monitor every 14 days 1 Bottle 3     blood glucose monitoring (NO BRAND SPECIFIED) meter device kit Use to test blood sugar 4 times daily or as directed. 1 kit 0     cholecalciferol 1000 UNITS TABS Take 1,000 Units by mouth daily 30 tablet      citalopram (CELEXA) 20 MG tablet Take 20 mg by mouth every evening       digoxin (LANOXIN) 125 MCG tablet Take 1 tablet (125 mcg) by mouth daily 90 tablet 3     Ferrous Sulfate 324 (65 Fe) MG TBEC TK 1 T PO D       furosemide (LASIX) 20 MG tablet Take 1 tablet (20 mg) by mouth daily 90 tablet 3     hydrocortisone (CORTIZONE-10) 1 % ointment Apply topically 2 times daily as needed for itching       insulin  glargine (LANTUS SOLOSTAR) 100 UNIT/ML pen Inject 25 Units Subcutaneous At Bedtime Lantus Solostar Pen 15 mL 0     lisinopril (ZESTRIL) 5 MG tablet        Magnesium Oxide 250 MG TABS Take 375 mg by mouth       melatonin 5 MG tablet Take 5 mg by mouth At Bedtime       metFORMIN (GLUCOPHAGE-XR) 750 MG 24 hr tablet Take 750 mg by mouth 2 times daily (with meals)       metoprolol succinate ER (TOPROL-XL) 100 MG 24 hr tablet Take 1 tablet (100 mg) by mouth daily 90 tablet 3     nitroglycerin (NITROSTAT) 0.4 MG sublingual tablet For chest pain place 1 tablet under the tongue every 5 minutes for 3 doses. If symptoms persist 5 minutes after 1st dose call 911. 25 tablet      nystatin (MYCOSTATIN) 700780 UNIT/GM external cream Apply topically daily as needed for dry skin        order for DME Equipment to be ordered:  Scale- Qty 1  Commander Flex - Qty. 1  Pulse-Oximeter - Qty. 1  Use as directed 1 Units 0     order for DME Equipment being ordered: DH2 shoe 1 Device 0     oxyCODONE IR (ROXICODONE) 5 MG tablet Take 1-2 tablets (5-10 mg) by mouth every 4 hours as needed for pain 20 tablet 0     pantoprazole (PROTONIX) 40 MG EC tablet Take 40 mg by mouth daily       polyethylene glycol (MIRALAX/GLYCOLAX) Packet Take 1 packet by mouth daily as needed        PROVIDER DOSED MANAGED WARFARIN, COUMADIN, OUTPATIENT Dose based on INR per Primary MD/Nursing Home MD.       sodium chloride (EQ SALINE NASAL SPRAY) 0.65 % nasal spray Spray 1 spray into both nostrils daily as needed for congestion 1 Bottle 1     thin (NO BRAND SPECIFIED) lancets 1 each 4 times daily Use with lanceting device. 100 each 1     traZODone (DESYREL) 50 MG tablet Take 150 mg by mouth At Bedtime       ULTICARE SHORT 31G X 8 MM insulin pen needle        warfarin ANTICOAGULANT (COUMADIN) 5 MG tablet 7.5mg MWF, and 5mg TuThSatSun, or as directed by the Medication Monitoring Clinic at the [facility]       warfarin ANTICOAGULANT (COUMADIN) 5 MG tablet TAKE 1 TO 1.5 TABLETS  BY MOUTH EVERY DAY OR AS DIRECTED BY THE COUMADIN CLINIC. 135 tablet 1     lisinopril (PRINIVIL/ZESTRIL) 5 MG tablet Take 1 tablet (5 mg) by mouth daily 90 tablet 3       Allergies - reviewed  Past history -HFrEF, CAD  Social history - reviewed  Family history -reviewed  ROS: as above, otherwise negative     Exam:   Patient in no apparent distress  Speech is normal  Weight N/A  BP N/A  HR N/A  Abdominal girth and weight stable per patient    Data:  Recent cardiac investigations - reviewed  Labs - reviewed    Echo 10/2018  Left ventricular function moderately reduced, LVEF 35-40%.  Mildly decreased right ventricular systolic function     In comparison with the previous study the LVEF has improved slightly.    Assessment and Plan:  79 year old male with    1. HFrEF, Stage C, NYHA 3  2. CAD, s/p CABG L-LAD, SVG-OM1, SVG-D1 in 1995. s/p LHc in 3/2018 and PCI to mLAD.   3.  Paroxysmal atrial fibrillation  4. Essential Hypertension  5. Dyslipidemia  6. DM Type II  7. PAD with LE stents and bypass   8. R BKA Oct 2018    Refill lisinopril 5 mg/d  Advised compliance with medications  Encouraged to remain active at home  Set up Cardiocom monitoring system  COVID 19 precautions reinforced  F/u in 4 months with dig level and BMP    Phone call duration: 12 minutes    Clarisse Valdes MD, MS  Staff Cardiologist, [facility]   Pager: [phone]        Please do not hesitate to contact me if you have any questions/concerns.     Sincerely,     Clarisse Valdes MD

## 2020-08-26 NOTE — PROGRESS NOTES
"Tad Manning is a 79 year old male who is being evaluated via a billable telephone visit.      The patient has been notified of following:     \"This telephone visit will be conducted via a call between you and your physician/provider. We have found that certain health care needs can be provided without the need for a physical exam.  This service lets us provide the care you need with a short phone conversation.  If a prescription is necessary we can send it directly to your pharmacy.  If lab work is needed we can place an order for that and you can then stop by our lab to have the test done at a later time.    If during the course of the call the physician/provider feels a telephone visit is not appropriate, you will not be charged for this service.\"    Patient has given verbal consent for Telephone visit?  Yes    What phone number would you like to be contacted at? [phone]    How would you like to obtain your AVS? Mail a copy    Tad Manning is a 79 year old male who is being evaluated via a billable telephone visit.      The patient has been notified of following:     \"This telephone visit will be conducted via a call between you and your physician/provider. We have found that certain health care needs can be provided without the need for a physical exam.  This service lets us provide the care you need with a short phone conversation.  If a prescription is necessary we can send it directly to your pharmacy.  If lab work is needed we can place an order for that and you can then stop by our lab to have the test done at a later time.    Telephone visits are billed at different rates depending on your insurance coverage. During this emergency period, for some insurers they may be billed the same as an in-person visit.  Please reach out to your insurance provider with any questions.    If during the course of the call the physician/provider feels a telephone visit is not appropriate, you will not " "be charged for this service.\"    Patient has given verbal consent for Telephone visit?  Yes    What phone number would you like to be contacted at? [phone]    How would you like to obtain your AVS? Mail a copy    History:  79 year old male with HF, CAD    Has moved to [place], has not set up [employer] Cardiocom devices   No orthopnea or PND, chest pain at rest, syncope  Stable dyspnea on exertion  Walks to get paper and mail - uses his walker and wears R leg prosthesis  No recent hospitalization for HF/MI/stroke      Current Outpatient Medications   Medication Sig Dispense Refill     alcohol swab prep pads Use to swab area of injection/jina as directed. 100 each 3     aspirin (ASA) 81 MG tablet Take 1 tablet (81 mg) by mouth daily       atorvastatin (LIPITOR) 20 MG tablet Take 1 tablet (20 mg) by mouth daily 90 tablet 0     blood glucose (NO BRAND SPECIFIED) test strip Use to test blood sugar 4 times daily or as directed. 100 strip 6     blood glucose calibration (NO BRAND SPECIFIED) solution 1 drop every 14 days Use 1 drop to calibrate blood glucose monitor every 14 days 1 Bottle 3     blood glucose monitoring (NO BRAND SPECIFIED) meter device kit Use to test blood sugar 4 times daily or as directed. 1 kit 0     cholecalciferol 1000 UNITS TABS Take 1,000 Units by mouth daily 30 tablet      citalopram (CELEXA) 20 MG tablet Take 20 mg by mouth every evening       digoxin (LANOXIN) 125 MCG tablet Take 1 tablet (125 mcg) by mouth daily 90 tablet 3     Ferrous Sulfate 324 (65 Fe) MG TBEC TK 1 T PO D       furosemide (LASIX) 20 MG tablet Take 1 tablet (20 mg) by mouth daily 90 tablet 3     hydrocortisone (CORTIZONE-10) 1 % ointment Apply topically 2 times daily as needed for itching       insulin glargine (LANTUS SOLOSTAR) 100 UNIT/ML pen Inject 25 Units Subcutaneous At Bedtime Lantus Solostar Pen 15 mL 0     lisinopril (ZESTRIL) 5 MG tablet        Magnesium Oxide 250 MG TABS Take 375 mg by mouth       melatonin " 5 MG tablet Take 5 mg by mouth At Bedtime       metFORMIN (GLUCOPHAGE-XR) 750 MG 24 hr tablet Take 750 mg by mouth 2 times daily (with meals)       metoprolol succinate ER (TOPROL-XL) 100 MG 24 hr tablet Take 1 tablet (100 mg) by mouth daily 90 tablet 3     nitroglycerin (NITROSTAT) 0.4 MG sublingual tablet For chest pain place 1 tablet under the tongue every 5 minutes for 3 doses. If symptoms persist 5 minutes after 1st dose call 911. 25 tablet      nystatin (MYCOSTATIN) 301059 UNIT/GM external cream Apply topically daily as needed for dry skin        order for DME Equipment to be ordered:  Scale- Qty 1  Commander Flex - Qty. 1  Pulse-Oximeter - Qty. 1  Use as directed 1 Units 0     order for DME Equipment being ordered: DH2 shoe 1 Device 0     oxyCODONE IR (ROXICODONE) 5 MG tablet Take 1-2 tablets (5-10 mg) by mouth every 4 hours as needed for pain 20 tablet 0     pantoprazole (PROTONIX) 40 MG EC tablet Take 40 mg by mouth daily       polyethylene glycol (MIRALAX/GLYCOLAX) Packet Take 1 packet by mouth daily as needed        PROVIDER DOSED MANAGED WARFARIN, COUMADIN, OUTPATIENT Dose based on INR per Primary MD/Nursing Home MD.       sodium chloride (EQ SALINE NASAL SPRAY) 0.65 % nasal spray Spray 1 spray into both nostrils daily as needed for congestion 1 Bottle 1     thin (NO BRAND SPECIFIED) lancets 1 each 4 times daily Use with lanceting device. 100 each 1     traZODone (DESYREL) 50 MG tablet Take 150 mg by mouth At Bedtime       ULTICARE SHORT 31G X 8 MM insulin pen needle        warfarin ANTICOAGULANT (COUMADIN) 5 MG tablet 7.5mg MWF, and 5mg TuThSatSun, or as directed by the [facility]       warfarin ANTICOAGULANT (COUMADIN) 5 MG tablet TAKE 1 TO 1.5 TABLETS BY MOUTH EVERY DAY OR AS DIRECTED BY THE COUMADIN CLINIC. 135 tablet 1     lisinopril (PRINIVIL/ZESTRIL) 5 MG tablet Take 1 tablet (5 mg) by mouth daily 90 tablet 3       Allergies - reviewed  Past history -HFrEF,  CAD  Social history - reviewed  Family history -reviewed  ROS: as above, otherwise negative     Exam:   Patient in no apparent distress  Speech is normal  Weight N/A  BP N/A  HR N/A  Abdominal girth and weight stable per patient    Data:  Recent cardiac investigations - reviewed  Labs - reviewed    Echo 10/2018  Left ventricular function moderately reduced, LVEF 35-40%.  Mildly decreased right ventricular systolic function     In comparison with the previous study the LVEF has improved slightly.    Assessment and Plan:  79 year old male with    1. HFrEF, Stage C, NYHA 3  2. CAD, s/p CABG L-LAD, SVG-OM1, SVG-D1 in 1995. s/p LHc in 3/2018 and PCI to mLAD.   3.  Paroxysmal atrial fibrillation  4. Essential Hypertension  5. Dyslipidemia  6. DM Type II  7. PAD with LE stents and bypass   8. R BKA Oct 2018    Refill lisinopril 5 mg/d  Advised compliance with medications  Encouraged to remain active at home  Set up Cardiocom monitoring system  COVID 19 precautions reinforced  F/u in 4 months with dig level and BMP    Phone call duration: 12 minutes    Clarisse Valdes MD, MS  Staff Cardiologist, [facility]   Pager: [phone]

## 2020-08-27 ENCOUNTER — PATIENT OUTREACH (OUTPATIENT)
Dept: CARDIOLOGY | Facility: CLINIC | Age: 80
End: 2020-08-27

## 2020-08-27 ENCOUNTER — ANTICOAGULATION THERAPY VISIT (OUTPATIENT)
Dept: ANTICOAGULATION | Facility: CLINIC | Age: 80
End: 2020-08-27

## 2020-08-27 DIAGNOSIS — I48.0 PAROXYSMAL ATRIAL FIBRILLATION (H): ICD-10-CM

## 2020-08-27 DIAGNOSIS — Z79.01 LONG TERM CURRENT USE OF ANTICOAGULANT THERAPY: ICD-10-CM

## 2020-08-27 LAB
CAPILLARY BLOOD COLLECTION: NORMAL
INR BLD: 3 (ref 0.86–1.14)

## 2020-08-27 PROCEDURE — 36416 COLLJ CAPILLARY BLOOD SPEC: CPT | Performed by: INTERNAL MEDICINE

## 2020-08-27 PROCEDURE — 85610 PROTHROMBIN TIME: CPT | Mod: QW | Performed by: INTERNAL MEDICINE

## 2020-08-27 NOTE — TELEPHONE ENCOUNTER
Called [facility] to discuss Cardiocom scale. Left voicemail and asked for call back.   Verónica Jarquin RN

## 2020-08-27 NOTE — PROGRESS NOTES
ANTICOAGULATION FOLLOW-UP CLINIC VISIT    Patient Name:  Tad Manning  Date:  8/27/2020  Contact Type:  Telephone    SUBJECTIVE:  Patient Findings     Positives:   Change in medications (he started taking OTC magnesium)    Comments:   Spoke with Hima.  He reports no changes in health or diet.  He reports he does not have any signs of bleeding.        Clinical Outcomes     Comments:   Spoke with Hima.  He reports no changes in health or diet.  He reports he does not have any signs of bleeding.           OBJECTIVE    Recent labs: (last 7 days)     08/27/20  0950   INR 3.0*       ASSESSMENT / PLAN  INR assessment THER    Recheck INR In: 6 WEEKS    INR Location Clinic      Anticoagulation Summary  As of 8/27/2020    INR goal:   2.0-3.0   TTR:   78.2 % (8.5 mo)   INR used for dosing:   3.0 (8/27/2020)   Warfarin maintenance plan:   7.5 mg (5 mg x 1.5) every Mon, Wed, Fri; 5 mg (5 mg x 1) all other days   Full warfarin instructions:   7.5 mg every Mon, Wed, Fri; 5 mg all other days   Weekly warfarin total:   42.5 mg   No change documented:   Yoselin Hunter RN   Plan last modified:   Chelsea Bland RN (4/3/2020)   Next INR check:   10/8/2020   Priority:   High   Target end date:   Indefinite    Indications    Atrial fibrillation -- on Warfarin [I48.91]  Long-term (current) use of anticoagulants [Z79.01] [Z79.01]             Anticoagulation Episode Summary     INR check location:       Preferred lab:       Send INR reminders to:   [facility]    Comments:   HIPPA Infor returned. OK to  on cell [phone].  labs @ [facility] faxed there 2/16.  fax: [phone]    Goes by Hima      Anticoagulation Care Providers     Provider Role Specialty Phone number    Clarisse Valdes MD Responsible Cardiology [phone]            See the Encounter Report to view Anticoagulation Flowsheet and Dosing Calendar (Go to Encounters tab in chart review, and find the Anticoagulation Therapy Visit)    Spoke  with Hima.  He will call the [facility] if he has any changes in health, diet or medications.    Yoselin Hunter RN

## 2020-08-28 ENCOUNTER — PATIENT OUTREACH (OUTPATIENT)
Dept: CARDIOLOGY | Facility: CLINIC | Age: 80
End: 2020-08-28

## 2020-08-28 NOTE — TELEPHONE ENCOUNTER
Called Hima to discuss return of his cardiocom scale. He is agreeable to return and will plan to have it picked up next Friday 9/4. Will call to remind him on Thursday.   Verónica Jarquin RN

## 2020-10-31 NOTE — PROGRESS NOTES
1/7/19 Addendum:  Mackenzie from [facility] calling.  She was given last INR/warfarin recommendations/ and next INR date.  Tad is requesting the next INR be on 1/15/19 through home care.  Yoselin Hunter RN     31-Oct-2020 10:36

## 2020-11-03 ENCOUNTER — ANTICOAGULATION THERAPY VISIT (OUTPATIENT)
Dept: ANTICOAGULATION | Facility: CLINIC | Age: 80
End: 2020-11-03

## 2020-11-03 DIAGNOSIS — Z79.01 LONG TERM CURRENT USE OF ANTICOAGULANT THERAPY: ICD-10-CM

## 2020-11-03 DIAGNOSIS — I48.92 ATRIAL FLUTTER, UNSPECIFIED TYPE (H): ICD-10-CM

## 2020-11-03 DIAGNOSIS — I48.0 PAROXYSMAL ATRIAL FIBRILLATION (H): ICD-10-CM

## 2020-11-03 LAB
CAPILLARY BLOOD COLLECTION: NORMAL
INR BLD: 2.4 (ref 0.86–1.14)

## 2020-11-03 PROCEDURE — 36416 COLLJ CAPILLARY BLOOD SPEC: CPT | Performed by: INTERNAL MEDICINE

## 2020-11-03 PROCEDURE — 85610 PROTHROMBIN TIME: CPT | Performed by: INTERNAL MEDICINE

## 2020-11-03 NOTE — PROGRESS NOTES
ANTICOAGULATION FOLLOW-UP CLINIC VISIT    Patient Name:  Tad Manning  Date:  11/3/2020  Contact Type:  Telephone    SUBJECTIVE:  Patient Findings         Clinical Outcomes     Negatives:  Major bleeding event, Thromboembolic event, Anticoagulation-related hospital admission, Anticoagulation-related ED visit, Anticoagulation-related fatality           OBJECTIVE    Recent labs: (last 7 days)     20  1332   INR 2.4*       ASSESSMENT / PLAN  INR assessment THER    Recheck INR In: 6 WEEKS    INR Location Clinic      Anticoagulation Summary  As of 11/3/2020    INR goal:  2.0-3.0   TTR:  81.1 % (8.5 mo)   INR used for dosin.4 (11/3/2020)   Warfarin maintenance plan:  7.5 mg (5 mg x 1.5) every Mon, Wed, Fri; 5 mg (5 mg x 1) all other days   Full warfarin instructions:  7.5 mg every Mon, Wed, Fri; 5 mg all other days   Weekly warfarin total:  42.5 mg   No change documented:  Ellen Santizo RN   Plan last modified:  Chelsea Bland RN (4/3/2020)   Next INR check:  12/15/2020   Priority:  Maintenance   Target end date:  Indefinite    Indications    Atrial fibrillation -- on Warfarin [I48.91]  Long-term (current) use of anticoagulants [Z79.01] [Z79.01]  Paroxysmal atrial fibrillation (H) [I48.0]             Anticoagulation Episode Summary     INR check location:      Preferred lab:      Send INR reminders to:  [facility]    Comments:  HIPPA Infor returned. OK to  on cell [phone].  labs @ [facility] [place] faxed there .  fax: [phone]    Goes by Hima      Anticoagulation Care Providers     Provider Role Specialty Phone number    Clarisse Valdes MD Referring Cardiovascular Disease [phone]            See the Encounter Report to view Anticoagulation Flowsheet and Dosing Calendar (Go to Encounters tab in chart review, and find the Anticoagulation Therapy Visit)    Spoke with patient. Gave them their lab results and new warfarin recommendation.  No changes in health, medication,  or diet. No missed doses, no falls. No signs or symptoms of bleed or clotting.     Ellen Santizo RN

## 2020-11-17 DIAGNOSIS — Z95.828 HISTORY OF ARTERIAL BYPASS OF LOWER EXTREMITY: ICD-10-CM

## 2020-11-17 DIAGNOSIS — I73.9 PAD (PERIPHERAL ARTERY DISEASE) (H): Primary | ICD-10-CM

## 2020-11-17 DIAGNOSIS — Z89.511 STATUS POST BELOW KNEE AMPUTATION, RIGHT (H): ICD-10-CM

## 2020-11-29 ENCOUNTER — HEALTH MAINTENANCE LETTER (OUTPATIENT)
Age: 80
End: 2020-11-29

## 2020-12-16 ENCOUNTER — HOSPITAL ENCOUNTER (OUTPATIENT)
Dept: ULTRASOUND IMAGING | Facility: CLINIC | Age: 80
End: 2020-12-16
Attending: SURGERY
Payer: MEDICARE

## 2020-12-16 DIAGNOSIS — I73.9 PAD (PERIPHERAL ARTERY DISEASE) (H): ICD-10-CM

## 2020-12-16 DIAGNOSIS — Z95.828 HISTORY OF ARTERIAL BYPASS OF LOWER EXTREMITY: ICD-10-CM

## 2020-12-16 PROCEDURE — 93922 UPR/L XTREMITY ART 2 LEVELS: CPT

## 2020-12-16 PROCEDURE — 93926 LOWER EXTREMITY STUDY: CPT | Mod: LT

## 2020-12-22 ENCOUNTER — VIRTUAL VISIT (OUTPATIENT)
Dept: OTHER | Facility: CLINIC | Age: 80
End: 2020-12-22
Attending: SURGERY
Payer: MEDICARE

## 2020-12-22 DIAGNOSIS — Z95.828 HISTORY OF ARTERIAL BYPASS OF LOWER EXTREMITY: Primary | ICD-10-CM

## 2020-12-22 DIAGNOSIS — Z89.511 STATUS POST BELOW KNEE AMPUTATION, RIGHT (H): ICD-10-CM

## 2020-12-22 PROCEDURE — 99442 PR PHYSICIAN TELEPHONE EVALUATION 11-20 MIN: CPT | Performed by: SURGERY

## 2020-12-22 NOTE — PROGRESS NOTES
"Tad Manning is a 80 year old male who is being evaluated via a billable telephone visit.      The patient has been notified of following:     \"This telephone visit will be conducted via a call between you and your physician/provider. We have found that certain health care needs can be provided without the need for a physical exam.  This service lets us provide the care you need with a short phone conversation.  If a prescription is necessary we can send it directly to your pharmacy.  If lab work is needed we can place an order for that and you can then stop by our lab to have the test done at a later time.    Telephone visits are billed at different rates depending on your insurance coverage. During this emergency period, for some insurers they may be billed the same as an in-person visit.  Please reach out to your insurance provider with any questions.    If during the course of the call the physician/provider feels a telephone visit is not appropriate, you will not be charged for this service.\"    Patient has given verbal consent for Telephone visit?  Yes    What phone number would you like to be contacted at? [phone]    How would you like to obtain your AVS? Mail a copy    Phone call duration: 11 minutes    Eloisa Moreno MA      "

## 2020-12-22 NOTE — PROGRESS NOTES
Tad Manning is a 80-year-old diabetic who is status post a left above-knee popliteal to below-knee popliteal bypass in 2014.  I utilized reverse greater saphenous vein for that procedure.  He is also status post a right below-knee amputation performed by me on 10/11/2018.  He had annual lower extremity noninvasive follow-up last week.  Due to the COVID-19 pandemic I am conducting a telephone interview with him today to discuss those results.    At today's visit he was in good spirits.  He has no left leg complaints.  Recently he has developed an irritation or sore spot on the medial aspect of the right BKA stump.  He denies open wound.  He is meeting with his prosthetists on 12/31/2020 hopefully for an adjustment of his right BK prosthesis.  No other issues.      Imaging:   ULTRASOUND LEFT LOWER EXTREMITY ARTERIAL DUPLEX December 16, 2020  10:58 AM      HISTORY: History of left above knee popliteal to below knee popliteal  bypass 2014. PAD (peripheral artery disease) (H). History of arterial  bypass of lower extremity.     COMPARISON: 12/18/2019.     FINDINGS: Color Doppler and spectral waveform analysis was performed  throughout the left above-knee to below-knee popliteal bypass. The  bypass is patent with diameters ranging between 6.0 and 6.6 mm. Inflow  and outflow arteries are patent.                                                                       IMPRESSION: Patent left above-knee to below-knee popliteal artery  bypass.     CHAU LUGO DO      ULTRASOUND WALLACE DOPPLER NO EXERCISE, 1-2 LEVELS, BILATERAL  December 16, 2020 11:04 AM      HISTORY: History of left above knee popliteal to below knee popliteal  bypass 2014. PAD (peripheral artery disease) (H). History of arterial  bypass of lower extremity.     COMPARISON: 10/1/2018.     FINDINGS:  Right WALLACE: Not performed.     Left WALLACE: Not calculated secondary to noncompressible vessels.     Right Digital brachial index: Not performed.  Left  Digital Brachial index: 1.07.     Waveforms: Triphasic on the left.                                                                      IMPRESSION:   1. Right WALLACE was not performed.  2. Left WALLACE is not calculated secondary to noncompressible vessels.  Normal left digital brachial index. Triphasic waveforms.     WALLACE CRITERIA:  >0.95 Normal  0.90 - 0.94 Mild  0.5 - 0.89 Moderate  0.2 - 0.49 Severe  <0.2 Critical     CHAU LUGO DO    ASSESSMENT:  1.  6 years status post left above-knee popliteal to below-knee popliteal bypass with vein graft clinically doing well with widely patent bypass.    2.  2 years status post right below knee amputation with area of skin irritation reported on the medial aspect of the right stump.  He denies open wound.      RECOMMENDATION:  I reviewed all the above with Hima.  He will continue his medical regimen.  He has an appointment with his prosthetist on 12/31/2020.  Hopefully he can get an adjustment and correct that situation.  Relative to the left leg bypass I have no concerns.  He will continue his medical regimen which includes Coumadin for atrial fibrillation.  Vascular surgical follow-up will be with me in 1 year for a repeat left leg graft ultrasound and an WALLACE at rest.    Total length of this telephone conversation was 11 minutes.    Solis Vick MD

## 2020-12-29 DIAGNOSIS — I73.9 PAD (PERIPHERAL ARTERY DISEASE) (H): ICD-10-CM

## 2020-12-29 DIAGNOSIS — Z95.828 HISTORY OF ARTERIAL BYPASS OF LOWER EXTREMITY: Primary | ICD-10-CM

## 2021-01-08 ENCOUNTER — ANTICOAGULATION THERAPY VISIT (OUTPATIENT)
Dept: ANTICOAGULATION | Facility: CLINIC | Age: 81
End: 2021-01-08

## 2021-01-08 DIAGNOSIS — I48.0 PAROXYSMAL ATRIAL FIBRILLATION (H): ICD-10-CM

## 2021-01-08 DIAGNOSIS — Z79.01 LONG TERM CURRENT USE OF ANTICOAGULANT THERAPY: ICD-10-CM

## 2021-01-08 LAB
CAPILLARY BLOOD COLLECTION: NORMAL
INR BLD: 3.8 (ref 0.86–1.14)

## 2021-01-08 PROCEDURE — 36416 COLLJ CAPILLARY BLOOD SPEC: CPT | Performed by: INTERNAL MEDICINE

## 2021-01-08 PROCEDURE — 85610 PROTHROMBIN TIME: CPT | Performed by: INTERNAL MEDICINE

## 2021-01-08 NOTE — PROGRESS NOTES
ANTICOAGULATION FOLLOW-UP CLINIC VISIT    Patient Name:  Tad Manning  Date:  1/8/2021  Contact Type:  Telephone    SUBJECTIVE:  Patient Findings     Comments:  Spoke with Hima.  He reports he has not had changes in medications.  He has been eating less greens lately and more dessert.  He does not have any signs of bleeding.  Will recommend a one time decrease of warfarin today, then back on his maintenance dose.  He will recheck INR in two weeks.  Reviewed signs of bleeding with Hima. He knows to be seen urgently in the ER if he develops any signs of bleeding or if he falls.        Clinical Outcomes     Comments:  Spoke with Hima.  He reports he has not had changes in medications.  He has been eating less greens lately and more dessert.  He does not have any signs of bleeding.  Will recommend a one time decrease of warfarin today, then back on his maintenance dose.  He will recheck INR in two weeks.  Reviewed signs of bleeding with Hima. He knows to be seen urgently in the ER if he develops any signs of bleeding or if he falls.           OBJECTIVE    Recent labs: (last 7 days)     01/08/21  1250   INR 3.8*       ASSESSMENT / PLAN  INR assessment SUPRA    Recheck INR In: 2 WEEKS    INR Location Clinic      Anticoagulation Summary  As of 1/8/2021    INR goal:  2.0-3.0   TTR:  76.6 % (8.6 mo)   INR used for dosing:  3.8 (1/8/2021)   Warfarin maintenance plan:  7.5 mg (5 mg x 1.5) every Mon, Wed, Fri; 5 mg (5 mg x 1) all other days   Full warfarin instructions:  1/8: 2.5 mg; Otherwise 7.5 mg every Mon, Wed, Fri; 5 mg all other days   Weekly warfarin total:  42.5 mg   Plan last modified:  Chelsea Bland RN (4/3/2020)   Next INR check:  1/22/2021   Priority:  High   Target end date:  Indefinite    Indications    Atrial fibrillation -- on Warfarin [I48.91]  Long-term (current) use of anticoagulants [Z79.01] [Z79.01]  Paroxysmal atrial fibrillation (H) [I48.0]             Anticoagulation Episode Summary      INR check location:      Preferred lab:      Send INR reminders to:  [facility]    Comments:  HIPPA Infor returned. OK to  on cell [phone].  labs @ [facility] [place] faxed there 2/16.  fax: [phone]    Goes by Hima      Anticoagulation Care Providers     Provider Role Specialty Phone number    Clarisse Valdes MD Referring Cardiovascular Disease [phone]            See the Encounter Report to view Anticoagulation Flowsheet and Dosing Calendar (Go to Encounters tab in chart review, and find the Anticoagulation Therapy Visit)    Spoke with Hima.    Yoselin Hunter RN

## 2021-01-10 NOTE — PROGRESS NOTES
HPI:   Mr. Tad Manning is a 79yo M with PMH of HFrEF (Stage C, NYHA 3), CAD, s/p CABG L-LAD, SVG-OM1, SVG-D1 in 1995. 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    He underwent a LHC in March 2018 that showed moderate disease in his RCA, severe LAD disease and patent grafts and had two drug eluting stents placed in the proximal and mid LAD. RHC showed elevated right and left sided pressures with type II pulmonary hypertension. He then underwent a R leg below knee amputation in Oct 2018.      Interval History:  Pt was walking on his own. Lost energy and breathing capacity. Was walking with a walker, now using a wheelchair. He denies chest pain, new ALEXIS, palpitations, lightheadedness or dizziness. Exercise capacity is limited. He states most of his symptoms coincide with new R knee erythema starting about 3 months ago. Denies any h/o gout. Reports mild tenderness. No significant swelling. No h/o gout. Pain is not significantly worsened with flexion/extension of the knee. His prosthesis was adjusted in December 2020.    He has been compliant with all of his medications.  He does report some concern with mildly elevated blood pressures in the upper 130s systolic.    His last A1c 1 yr ago was 14, reports his recent blood glucoses have been about 130 in the morning.  He does not recall his latest A1c.    ROS:  A complete 10-pt ROS was negative except as above.    Cardiac meds  - furosemide 20 mg/d  - lisinopril 5 mg/d  - digoxin 125 mcg daily  - warfarin  - metoprolol  mg daily  - atorvastatin 20 mg daily  - Nitro PRN    Current Outpatient Medications   Medication Sig Dispense Refill     alcohol swab prep pads Use to swab area of injection/jina as directed. 100 each 3     apixaban ANTICOAGULANT (ELIQUIS ANTICOAGULANT) 5 MG tablet Take 1 tablet (5 mg) by  mouth 2 times daily 60 tablet 4     aspirin (ASA) 81 MG tablet Take 1 tablet (81 mg) by mouth daily       atorvastatin (LIPITOR) 20 MG tablet Take 1 tablet (20 mg) by mouth daily 90 tablet 0     blood glucose (NO BRAND SPECIFIED) test strip Use to test blood sugar 4 times daily or as directed. 100 strip 6     blood glucose calibration (NO BRAND SPECIFIED) solution 1 drop every 14 days Use 1 drop to calibrate blood glucose monitor every 14 days 1 Bottle 3     blood glucose monitoring (NO BRAND SPECIFIED) meter device kit Use to test blood sugar 4 times daily or as directed. 1 kit 0     cholecalciferol 1000 UNITS TABS Take 1,000 Units by mouth daily 30 tablet      citalopram (CELEXA) 20 MG tablet Take 20 mg by mouth every evening       digoxin (LANOXIN) 125 MCG tablet Take 1 tablet (125 mcg) by mouth daily 90 tablet 3     Ferrous Sulfate 324 (65 Fe) MG TBEC TK 1 T PO D       furosemide (LASIX) 20 MG tablet Take 1 tablet (20 mg) by mouth daily 90 tablet 3     insulin glargine (LANTUS SOLOSTAR) 100 UNIT/ML pen Inject 25 Units Subcutaneous At Bedtime Lantus Solostar Pen 15 mL 0     lisinopril (ZESTRIL) 5 MG tablet Take 1 tablet (5 mg) by mouth daily 90 tablet 3     lisinopril (ZESTRIL) 5 MG tablet        Magnesium Oxide 250 MG TABS Take 375 mg by mouth       melatonin 5 MG tablet Take 5 mg by mouth At Bedtime       metFORMIN (GLUCOPHAGE-XR) 750 MG 24 hr tablet Take 750 mg by mouth 2 times daily (with meals)       metoprolol succinate ER (TOPROL-XL) 100 MG 24 hr tablet Take 1 tablet (100 mg) by mouth daily 90 tablet 3     metoprolol succinate ER (TOPROL-XL) 25 MG 24 hr tablet Take 1 tablet (25 mg) by mouth daily 90 tablet 1     nitroglycerin (NITROSTAT) 0.4 MG sublingual tablet For chest pain place 1 tablet under the tongue every 5 minutes for 3 doses. If symptoms persist 5 minutes after 1st dose call 911. 25 tablet      nystatin (MYCOSTATIN) 866474 UNIT/GM external cream Apply topically daily as needed for dry skin         order for DME Equipment to be ordered:  Scale- Qty 1  Commander Flex - Qty. 1  Pulse-Oximeter - Qty. 1  Use as directed 1 Units 0     order for DME Equipment being ordered: DH2 shoe 1 Device 0     pantoprazole (PROTONIX) 40 MG EC tablet Take 40 mg by mouth daily       polyethylene glycol (MIRALAX/GLYCOLAX) Packet Take 1 packet by mouth daily as needed        PROVIDER DOSED MANAGED WARFARIN, COUMADIN, OUTPATIENT Dose based on INR per Primary MD/Nursing Home MD.       sodium chloride (EQ SALINE NASAL SPRAY) 0.65 % nasal spray Spray 1 spray into both nostrils daily as needed for congestion 1 Bottle 1     thin (NO BRAND SPECIFIED) lancets 1 each 4 times daily Use with lanceting device. 100 each 1     traZODone (DESYREL) 50 MG tablet Take 150 mg by mouth At Bedtime       ULTICARE SHORT 31G X 8 MM insulin pen needle        hydrocortisone (CORTIZONE-10) 1 % ointment Apply topically 2 times daily as needed for itching         Past Medical History:   Diagnosis Date     Arthritis      ASCVD (arteriosclerotic cardiovascular disease)      BMI 30.0-30.9,adult      BPH (benign prostatic hypertrophy)      Cellulitis      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)      Coronary artery disease     triple bypass 1995     Diabetes mellitus (H)      Diabetic polyneuropathy (H)      History of blood transfusion      Hyperlipidaemia      Hypertension      Non-healing ulcer of foot (H)     Right     Noninflammatory pericardial effusion      Osteomyelitis of left foot (H)      PVD (peripheral vascular disease) (H)      Sebaceous cyst        Past Surgical History:   Procedure Laterality Date     AMPUTATE LEG BELOW KNEE Right 10/8/2018    Procedure: AMPUTATE LEG BELOW KNEE;  OPEN RIGHT LOWER LEG AMPUTATION WITH WOUND VAC PLACEMENT    EBL: 50mL;  Surgeon: Amador Vick MD;  Location: [facility] OR     AMPUTATE REVISION STUMP LOWER EXTREMITY Right 10/11/2018    Procedure: AMPUTATE REVISION STUMP LOWER  EXTREMITY;  CLOSURE RIGHT BELOW KNEE AMPUTATION;  Surgeon: Amador Vick MD;  Location:  OR     AMPUTATE TOE(S)  1/10/2014    Procedure: AMPUTATE TOE(S);;  Surgeon: Amador Vick MD;  Location:  OR     APPLY WOUND VAC Right 10/8/2018    Procedure: APPLY WOUND VAC;;  Surgeon: Amador Vick MD;  Location:  OR     BONE MARROW BIOPSY, BONE SPECIMEN, NEEDLE/TROCAR  10/19/2012    Procedure: BIOPSY BONE MARROW;  BONE MARROW BIOPSY  (CONSCIOUS SED);  Surgeon: Ramon Quesada MD;  Location:  GI     BYPASS GRAFT FEMOROPOPLITEAL  1/10/2014    Procedure: BYPASS GRAFT FEMOROPOPLITEAL;  Left above knee popliteal to  Below knee popliteal bypass using transverse saphenous vein graft. Left 2nd Toe amputation;  Surgeon: Amador Vick MD;  Location:  OR     CARDIAC SURGERY      angioplasty with stent, triple bypass     EXCISE CYST GENERIC (LOCATION) N/A 2/1/2016    Procedure: EXCISE CYST GENERIC (LOCATION);  Surgeon: Amador Vick MD;  Location:  [facility]     GRAFT VEIN FROM EXTREMITY (LOCATION)  1/10/2014    Procedure: GRAFT VEIN FROM EXTREMITY (LOCATION);;  Surgeon: Amador Vick MD;  Location:  OR     LAMINECTOMY THORACIC ONE LEVEL N/A 2/8/2017    Procedure: LAMINECTOMY THORACIC ONE LEVEL;  Surgeon: Marcelo Trent MD;  Location: [facility] OR     OPTICAL TRACKING SYSTEM FUSION POSTERIOR SPINE THORACIC THREE+ LEVELS N/A 2/12/2017    Procedure: OPTICAL TRACKING SYSTEM FUSION POSTERIOR SPINE THORACIC THREE+ LEVELS;  Surgeon: Marcelo Trent MD;  Location: [facility] OR     TONSILLECTOMY       VASCULAR SURGERY      ptcr legs   T9-10 discitis with overlying epidural abscess  S/p laminectomy / abscess evacuation.  Critical lower limb ischemia , s/p L AK pop to BK pop bypass with rGSV on 1/10/2014  Family History   Problem Relation Age of Onset     Cancer Mother         leukemia       Social History     Tobacco Use     Smoking status: Former Smoker     Packs/day: 2.00     Years: 30.00     Pack  "years: 60.00     Types: Cigarettes     Quit date: 1995     Years since quittin.5     Smokeless tobacco: Never Used   Substance Use Topics     Alcohol use: No       Allergies   Allergen Reactions     Blood Transfusion Related (Informational Only) Other (See Comments)     Patient has a history of a clinically significant antibody against RBC antigens.  A delay in compatible RBCs may occur.        Physical Examination:  /59 (BP Location: Right arm, Patient Position: Chair, Cuff Size: Adult Regular)   Pulse 65   Ht 1.791 m (5' 10.5\")   SpO2 97%   BMI 24.51 kg/m   Body mass index is 24.51 kg/m .  Gen: pleasant male in no apparent distress      HEENT: NC/AT, EOMI  Neck: supple, no JVD  Heart: regular, no m/r/g   Lungs: Clear bilaterally, no wheezes or crackles  Abdomen: Soft, nontender, nondistended  Extremities: Right below knee amputation, trace edema LLE, 2+ peripheral pulses  Neuro: alert and oriented, no focal deficits  Skin: Erythematous over the right knee, no drainage  Musculoskeletal: R BKA and he has a prosthetic leg    Laboratory:    Chemistry panel:   Recent Labs   Lab Test 19  1420 19  1035  10/03/18  0636  18  0710 18  0042  18  0453    138   < > 135   < > 134 132*   < > 140   POTASSIUM 4.5 4.3   < > 3.2*   < > 4.4 4.5   < > 3.7   CHLORIDE 105 105   < > 103   < > 101 96   < > 105   CO2 28 26   < > 22   < > 25 24   < > 25   ANIONGAP 5 7   < > 10   < > 8 12   < > 10   * 192*   < > 135*   < > 285* 303*   < > 131*   BUN 23 23   < > 30   < > 82* 95*   < > 26   CR 1.08 1.03   < > 1.28*   < > 1.91* 2.23*   < > 0.93   JUSTINO 9.0 9.0   < > 8.0*   < > 8.8 9.7   < > 8.6   MAG  --   --   --   --   --  1.8  --   --  1.7   GFRESTIMATED 66 70   < > 55*   < > 34* 29*   < > 79   AST  --   --   --  15  --   --  11   < >  --    ALT  --   --   --  14  --   --  27   < >  --     < > = values in this interval not displayed.       CBC:   Recent Labs   Lab Test " 01/14/19  1202 11/08/18  10/22/18  10/14/18  0757  10/11/18  1657   WBC  --   --   --  4.2  --  6.5  --  9.1   RBC  --   --   --  2.94*  --  2.73*  --  2.62*   HGB 12.6* 9.9*   < > 8.5*   < > 8.2*   < > 7.8*   HCT  --   --   --  27.0*  --  24.2*  --  23.5*   MCV  --   --   --  91.8  --  89  --  90   MCH  --   --   --   --   --  30.0  --  29.8   MCHC  --   --   --   --   --  33.9  --  33.2   RDW  --   --   --  16.5*  --  15.9*  --  16.1*   PLT  --   --   --  188  --  130*  --  152    < > = values in this interval not displayed.       Lipid Panel:  Recent Labs   Lab Test 04/09/18  0951 05/23/14  0745 01/10/14  0923   CHOL 65 134 92   HDL 30* 34* 28*   LDL 23 48 45   TRIG 58 257* 92   CHOLHDLRATIO  --  3.9 3.3       Thyroid:   TSH   Date Value Ref Range Status   10/03/2018 1.11 0.40 - 4.00 mU/L Final     No results found for: T4  Hemoglobin A1C   Date Value Ref Range Status   01/09/2020 14.1 (H) 0 - 5.6 % Final     Comment:     Normal <5.7% Prediabetes 5.7-6.4%  Diabetes 6.5% or higher - adopted from ADA   consensus guidelines.       INR   Date Value Ref Range Status   05/18/2020 2.47 (H) 0.86 - 1.14 Final     INR Point of Care   Date Value Ref Range Status   01/08/2021 3.8 (H) 0.86 - 1.14 Final     Comment:     This test is intended for monitoring Coumadin therapy.  Results are not   accurate in patients with prolonged INR due to factor deficiency.           Cardiac data:    ECG today: NSR with first degree AV block, anterolateral TWI      ECG 1/14/19: NSR with first degree AV block, anterolateral TWI     ECG July 2018  Atrial fibrillation with rate of 75, nonspecific T-wave changes seen laterally (seen previously)    ECG 04/18: atrial fibrillation that is well controlled, nonspecific T-wave abnormalities laterally.    WALLACE 12/2020: 1. Right WALLACE was not performed. 2. Left WALLACE is not calculated secondary to noncompressible vessels. Normal left digital brachial index. Triphasic waveforms.    Arterial duplex 12/2020:  Patent left above-knee to below-knee popliteal artery bypass.    Echo 10/5/18  Left ventricular function  Is moderately reduced, estimated at 35-40%.  There is mild-moderate global hypokinesia of the left ventricle.  Mildly decreased right ventricular systolic function     In comparison with the previous study the LVEF has improved slightly.    RHC 3/2018      LCH 3/2018    Echo 2/2018  Severe left ventricular dilation is present. Biplane LV EF 20%. Severe diffuse hypokinesis is present.  Mild to moderate right ventricular dilation is present.  Global right ventricular function is moderately to severely reduced.  Dilation of the inferior vena cava is present with abnormal respiratory  variation in diameter.  No pericardial effusion is present.  A left pleural effusion is present.    ECG February 2018  Atrial fibrillation nonspecific ST-T abnormalities    EKG 04/06/17:    NSR Rate approx 125 bpm, LAD, no ischemic changes.    Echo (2/24/2017)  Technically difficult study.The patient's rhythm is atrial fibrillation.  The left ventricle is severely dilated. Left ventricular systolic function is  severely reduced, ejection fraction is estimated at 25-30%. There is severe  global hypokinesis.  Right ventricular systolic function is mildly reduced.  The right ventricular systolic pressure is approximated at 15 mmHg plus the  right atrial pressure. IVC is plethoric and estimated mean RA pressure is 15  mmHg.  No pericardial effusion present.    Assessment/Plan:  1. HFrEF, Stage C, NYHA 3  2. CAD  s/p CABG L-LAD, SVG-OM1, SVG-D1 in 1995. s/p LHc in 3/2018 and PCI to mLAD. Not on ASA 2/2 already being   3. Paroxysmal atrial fibrillation  4. Essential Hypertension  5. Dyslipidemia  6. DM Type II  7. PAD with LE stents and bypass   8. R BKA with skin erythema    Mr. Tad Manning is a 81yo M with PMH of HFrEF (Stage C, NYHA 3), CAD, s/p CABG L-LAD, SVG-OM1, SVG-D1 in 1995. 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    Patient appears euvolemic on exam today.  He has a heart rate of 65 with a systolic blood pressure of 135.  He was advised to continue all his current cardiac medications.    In terms of anticoagulation management, recommend changing warfarin to Eliquis 5mg BID. Digoxin level added to today's labs.    Follow up in 6 months.    ATTENDING ATTESTATION:  This patient has been seen and examined by me January 11, 2021 with  Dr. Ramesh MD, cardiology fellow. I have reviewed the vitals, laboratory and imaging data relevant to this patient's care. I have edited this note to reflect our joint assessment and plan, and discussed the plan with the patient.    Mr. Manning returns for follow-up.  He has ischemic cardiomyopathy and chronic systolic heart failure with an ejection fraction around 35-40%. He underwent a coronary angiogram in March 2018 and was noted to have patent grafts and his native  LAD had significant obstructive disease after the LIMA anastomosis which was intervened with placement of drug-eluting stents.   On exam today he is euvolemic.  His blood pressure is controlled. He is in sinus rhythm today. His lipids are well controlled on atorvastatin to 20 mg once daily.  He is on GDMT and is on lisinopril, metoprolol, digoxin, atorva and aspirin. He is on warfarin for paroxysmal atrial fibrillation which I will switch to Eliquis 5 mg twice daily. Dig level today is 1.1.   Follow-up with me in 6 months.     Clarisse Valdes MD, MS  Professor of Medicine  Cardiovascular division

## 2021-01-11 ENCOUNTER — OFFICE VISIT (OUTPATIENT)
Dept: CARDIOLOGY | Facility: CLINIC | Age: 81
End: 2021-01-11
Attending: INTERNAL MEDICINE
Payer: COMMERCIAL

## 2021-01-11 VITALS
DIASTOLIC BLOOD PRESSURE: 59 MMHG | HEART RATE: 65 BPM | OXYGEN SATURATION: 97 % | HEIGHT: 71 IN | BODY MASS INDEX: 24.51 KG/M2 | SYSTOLIC BLOOD PRESSURE: 135 MMHG

## 2021-01-11 DIAGNOSIS — I73.9 PAD (PERIPHERAL ARTERY DISEASE) (H): ICD-10-CM

## 2021-01-11 DIAGNOSIS — I25.5 ISCHEMIC CARDIOMYOPATHY: ICD-10-CM

## 2021-01-11 DIAGNOSIS — N17.9 AKI (ACUTE KIDNEY INJURY) (H): ICD-10-CM

## 2021-01-11 DIAGNOSIS — N18.30 CRF (CHRONIC RENAL FAILURE), STAGE 3 (MODERATE) (H): ICD-10-CM

## 2021-01-11 DIAGNOSIS — I50.22 CHRONIC SYSTOLIC HEART FAILURE (H): ICD-10-CM

## 2021-01-11 DIAGNOSIS — I10 ESSENTIAL HYPERTENSION: ICD-10-CM

## 2021-01-11 DIAGNOSIS — Z79.899 ENCOUNTER FOR LONG-TERM (CURRENT) USE OF MEDICATIONS: ICD-10-CM

## 2021-01-11 DIAGNOSIS — I48.0 PAROXYSMAL A-FIB (H): Primary | ICD-10-CM

## 2021-01-11 LAB
ANION GAP SERPL CALCULATED.3IONS-SCNC: 4 MMOL/L (ref 3–14)
BUN SERPL-MCNC: 31 MG/DL (ref 7–30)
CALCIUM SERPL-MCNC: 9.2 MG/DL (ref 8.5–10.1)
CHLORIDE SERPL-SCNC: 105 MMOL/L (ref 94–109)
CO2 SERPL-SCNC: 28 MMOL/L (ref 20–32)
CREAT SERPL-MCNC: 1.49 MG/DL (ref 0.66–1.25)
DIGOXIN SERPL-MCNC: 1.1 UG/L (ref 0.5–2)
GFR SERPL CREATININE-BSD FRML MDRD: 44 ML/MIN/{1.73_M2}
GLUCOSE SERPL-MCNC: 176 MG/DL (ref 70–99)
POTASSIUM SERPL-SCNC: 4.6 MMOL/L (ref 3.4–5.3)
SODIUM SERPL-SCNC: 137 MMOL/L (ref 133–144)

## 2021-01-11 PROCEDURE — 99214 OFFICE O/P EST MOD 30 MIN: CPT | Mod: GC | Performed by: INTERNAL MEDICINE

## 2021-01-11 PROCEDURE — 80048 BASIC METABOLIC PNL TOTAL CA: CPT | Performed by: PATHOLOGY

## 2021-01-11 PROCEDURE — G0463 HOSPITAL OUTPT CLINIC VISIT: HCPCS | Mod: 25

## 2021-01-11 PROCEDURE — 93005 ELECTROCARDIOGRAM TRACING: CPT

## 2021-01-11 PROCEDURE — 36415 COLL VENOUS BLD VENIPUNCTURE: CPT | Performed by: PATHOLOGY

## 2021-01-11 PROCEDURE — 80162 ASSAY OF DIGOXIN TOTAL: CPT | Mod: 90 | Performed by: PATHOLOGY

## 2021-01-11 RX ORDER — METOPROLOL SUCCINATE 25 MG/1
25 TABLET, EXTENDED RELEASE ORAL DAILY
Qty: 90 TABLET | Refills: 1 | Status: SHIPPED | OUTPATIENT
Start: 2021-01-11 | End: 2021-01-11

## 2021-01-11 ASSESSMENT — PAIN SCALES - GENERAL: PAINLEVEL: NO PAIN (0)

## 2021-01-11 NOTE — PATIENT INSTRUCTIONS
Patient Instructions:  It was a pleasure to see you in the cardiology clinic today.      If you have any questions, you can reach my nurse, Kizzy Oakes , at [phone].  Press Option #1 for the [facility], and then press Option #4 for nursing.    We are encouraging the use of MyChart to communicate with your HealthCare Provider    Medication Changes:  1). Stop Warfarin and Start Eliquis 5mg by mouth twice a day      Studies Ordered:None    The results from today include:EKG was done today     Please follow up: With Dr. Valdes in     Sincerely,    Clarisse Valdes MD     If you have an urgent need after hours (8:00 am to 4:30 pm) please call [phone] and ask for the cardiology fellow on call.

## 2021-01-11 NOTE — LETTER
1/11/2021      RE: Tad Manning  [address]  [facility] [address]       Dear Colleague,    Thank you for the opportunity to participate in the care of your patient, Tad Manning, at the [facility] HEART [facility] at [facility]. Please see a copy of my visit note below.    HPI:   Mr. Tad Manning is a 81yo M with PMH of HFrEF (Stage C, NYHA 3), CAD, s/p CABG L-LAD, SVG-OM1, SVG-D1 in 1995. 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    He underwent a LHC in March 2018 that showed moderate disease in his RCA, severe LAD disease and patent grafts and had two drug eluting stents placed in the proximal and mid LAD. RHC showed elevated right and left sided pressures with type II pulmonary hypertension. He then underwent a R leg below knee amputation in Oct 2018.      Interval History:  Pt was walking on his own. Lost energy and breathing capacity. Was walking with a walker, now using a wheelchair. He denies chest pain, new ALEXIS, palpitations, lightheadedness or dizziness. Exercise capacity is limited. He states most of his symptoms coincide with new R knee erythema starting about 3 months ago. Denies any h/o gout. Reports mild tenderness. No significant swelling. No h/o gout. Pain is not significantly worsened with flexion/extension of the knee. His prosthesis was adjusted in December 2020.    He has been compliant with all of his medications.  He does report some concern with mildly elevated blood pressures in the upper 130s systolic.    His last A1c 1 yr ago was 14, reports his recent blood glucoses have been about 130 in the morning.  He does not recall his latest A1c.    ROS:  A complete 10-pt ROS was negative except as above.    Cardiac meds  - furosemide 20 mg/d  - lisinopril 5  mg/d  - digoxin 125 mcg daily  - warfarin  - metoprolol  mg daily  - atorvastatin 20 mg daily  - Nitro PRN    Current Outpatient Medications   Medication Sig Dispense Refill     alcohol swab prep pads Use to swab area of injection/jina as directed. 100 each 3     apixaban ANTICOAGULANT (ELIQUIS ANTICOAGULANT) 5 MG tablet Take 1 tablet (5 mg) by mouth 2 times daily 60 tablet 4     aspirin (ASA) 81 MG tablet Take 1 tablet (81 mg) by mouth daily       atorvastatin (LIPITOR) 20 MG tablet Take 1 tablet (20 mg) by mouth daily 90 tablet 0     blood glucose (NO BRAND SPECIFIED) test strip Use to test blood sugar 4 times daily or as directed. 100 strip 6     blood glucose calibration (NO BRAND SPECIFIED) solution 1 drop every 14 days Use 1 drop to calibrate blood glucose monitor every 14 days 1 Bottle 3     blood glucose monitoring (NO BRAND SPECIFIED) meter device kit Use to test blood sugar 4 times daily or as directed. 1 kit 0     cholecalciferol 1000 UNITS TABS Take 1,000 Units by mouth daily 30 tablet      citalopram (CELEXA) 20 MG tablet Take 20 mg by mouth every evening       digoxin (LANOXIN) 125 MCG tablet Take 1 tablet (125 mcg) by mouth daily 90 tablet 3     Ferrous Sulfate 324 (65 Fe) MG TBEC TK 1 T PO D       furosemide (LASIX) 20 MG tablet Take 1 tablet (20 mg) by mouth daily 90 tablet 3     insulin glargine (LANTUS SOLOSTAR) 100 UNIT/ML pen Inject 25 Units Subcutaneous At Bedtime Lantus Solostar Pen 15 mL 0     lisinopril (ZESTRIL) 5 MG tablet Take 1 tablet (5 mg) by mouth daily 90 tablet 3     lisinopril (ZESTRIL) 5 MG tablet        Magnesium Oxide 250 MG TABS Take 375 mg by mouth       melatonin 5 MG tablet Take 5 mg by mouth At Bedtime       metFORMIN (GLUCOPHAGE-XR) 750 MG 24 hr tablet Take 750 mg by mouth 2 times daily (with meals)       metoprolol succinate ER (TOPROL-XL) 100 MG 24 hr tablet Take 1 tablet (100 mg) by mouth daily 90 tablet 3     metoprolol succinate ER (TOPROL-XL) 25 MG 24 hr  tablet Take 1 tablet (25 mg) by mouth daily 90 tablet 1     nitroglycerin (NITROSTAT) 0.4 MG sublingual tablet For chest pain place 1 tablet under the tongue every 5 minutes for 3 doses. If symptoms persist 5 minutes after 1st dose call 911. 25 tablet      nystatin (MYCOSTATIN) 474817 UNIT/GM external cream Apply topically daily as needed for dry skin        order for DME Equipment to be ordered:  Scale- Qty 1  Commander Flex - Qty. 1  Pulse-Oximeter - Qty. 1  Use as directed 1 Units 0     order for DME Equipment being ordered: DH2 shoe 1 Device 0     pantoprazole (PROTONIX) 40 MG EC tablet Take 40 mg by mouth daily       polyethylene glycol (MIRALAX/GLYCOLAX) Packet Take 1 packet by mouth daily as needed        PROVIDER DOSED MANAGED WARFARIN, COUMADIN, OUTPATIENT Dose based on INR per Primary MD/Nursing Home MD.       sodium chloride (EQ SALINE NASAL SPRAY) 0.65 % nasal spray Spray 1 spray into both nostrils daily as needed for congestion 1 Bottle 1     thin (NO BRAND SPECIFIED) lancets 1 each 4 times daily Use with lanceting device. 100 each 1     traZODone (DESYREL) 50 MG tablet Take 150 mg by mouth At Bedtime       ULTICARE SHORT 31G X 8 MM insulin pen needle        hydrocortisone (CORTIZONE-10) 1 % ointment Apply topically 2 times daily as needed for itching         Past Medical History:   Diagnosis Date     Arthritis      ASCVD (arteriosclerotic cardiovascular disease)      BMI 30.0-30.9,adult      BPH (benign prostatic hypertrophy)      Cellulitis      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)      Coronary artery disease     triple bypass 1995     Diabetes mellitus (H)      Diabetic polyneuropathy (H)      History of blood transfusion      Hyperlipidaemia      Hypertension      Non-healing ulcer of foot (H)     Right     Noninflammatory pericardial effusion      Osteomyelitis of left foot (H)      PVD (peripheral vascular disease) (H)      Sebaceous cyst        Past  Surgical History:   Procedure Laterality Date     AMPUTATE LEG BELOW KNEE Right 10/8/2018    Procedure: AMPUTATE LEG BELOW KNEE;  OPEN RIGHT LOWER LEG AMPUTATION WITH WOUND VAC PLACEMENT    EBL: 50mL;  Surgeon: Amador Vick MD;  Location:  OR     AMPUTATE REVISION STUMP LOWER EXTREMITY Right 10/11/2018    Procedure: AMPUTATE REVISION STUMP LOWER EXTREMITY;  CLOSURE RIGHT BELOW KNEE AMPUTATION;  Surgeon: Amador Vick MD;  Location:  OR     AMPUTATE TOE(S)  1/10/2014    Procedure: AMPUTATE TOE(S);;  Surgeon: Amador Vick MD;  Location:  OR     APPLY WOUND VAC Right 10/8/2018    Procedure: APPLY WOUND VAC;;  Surgeon: Amador Vick MD;  Location:  OR     BONE MARROW BIOPSY, BONE SPECIMEN, NEEDLE/TROCAR  10/19/2012    Procedure: BIOPSY BONE MARROW;  BONE MARROW BIOPSY  (CONSCIOUS SED);  Surgeon: Ramon Quesada MD;  Location:  GI     BYPASS GRAFT FEMOROPOPLITEAL  1/10/2014    Procedure: BYPASS GRAFT FEMOROPOPLITEAL;  Left above knee popliteal to  Below knee popliteal bypass using transverse saphenous vein graft. Left 2nd Toe amputation;  Surgeon: Amador Vick MD;  Location:  OR     CARDIAC SURGERY      angioplasty with stent, triple bypass     EXCISE CYST GENERIC (LOCATION) N/A 2/1/2016    Procedure: EXCISE CYST GENERIC (LOCATION);  Surgeon: Amador Vick MD;  Location:  SD     GRAFT VEIN FROM EXTREMITY (LOCATION)  1/10/2014    Procedure: GRAFT VEIN FROM EXTREMITY (LOCATION);;  Surgeon: Amador Vick MD;  Location:  OR     LAMINECTOMY THORACIC ONE LEVEL N/A 2/8/2017    Procedure: LAMINECTOMY THORACIC ONE LEVEL;  Surgeon: Marcelo Trent MD;  Location: [facility] OR     OPTICAL TRACKING SYSTEM FUSION POSTERIOR SPINE THORACIC THREE+ LEVELS N/A 2/12/2017    Procedure: OPTICAL TRACKING SYSTEM FUSION POSTERIOR SPINE THORACIC THREE+ LEVELS;  Surgeon: Marcelo Trent MD;  Location:  OR     TONSILLECTOMY       VASCULAR SURGERY      ptcr legs   T9-10 discitis  "with overlying epidural abscess  S/p laminectomy / abscess evacuation.  Critical lower limb ischemia , s/p L AK pop to BK pop bypass with rGSV on 1/10/2014  Family History   Problem Relation Age of Onset     Cancer Mother         leukemia       Social History     Tobacco Use     Smoking status: Former Smoker     Packs/day: 2.00     Years: 30.00     Pack years: 60.00     Types: Cigarettes     Quit date: 1995     Years since quittin.5     Smokeless tobacco: Never Used   Substance Use Topics     Alcohol use: No       Allergies   Allergen Reactions     Blood Transfusion Related (Informational Only) Other (See Comments)     Patient has a history of a clinically significant antibody against RBC antigens.  A delay in compatible RBCs may occur.        Physical Examination:  /59 (BP Location: Right arm, Patient Position: Chair, Cuff Size: Adult Regular)   Pulse 65   Ht 1.791 m (5' 10.5\")   SpO2 97%   BMI 24.51 kg/m   Body mass index is 24.51 kg/m .  Gen: pleasant male in no apparent distress      HEENT: NC/AT, EOMI  Neck: supple, no JVD  Heart: regular, no m/r/g   Lungs: Clear bilaterally, no wheezes or crackles  Abdomen: Soft, nontender, nondistended  Extremities: Right below knee amputation, trace edema LLE, 2+ peripheral pulses  Neuro: alert and oriented, no focal deficits  Skin: Erythematous over the right knee, no drainage  Musculoskeletal: R BKA and he has a prosthetic leg    Laboratory:    Chemistry panel:   Recent Labs   Lab Test 19  1420 19  1035  10/03/18  0636  18  0710 18  0042  18  0453    138   < > 135   < > 134 132*   < > 140   POTASSIUM 4.5 4.3   < > 3.2*   < > 4.4 4.5   < > 3.7   CHLORIDE 105 105   < > 103   < > 101 96   < > 105   CO2 28 26   < > 22   < > 25 24   < > 25   ANIONGAP 5 7   < > 10   < > 8 12   < > 10   * 192*   < > 135*   < > 285* 303*   < > 131*   BUN 23 23   < > 30   < > 82* 95*   < > 26   CR 1.08 1.03   < > 1.28*   < > 1.91* 2.23* "   < > 0.93   JUSTINO 9.0 9.0   < > 8.0*   < > 8.8 9.7   < > 8.6   MAG  --   --   --   --   --  1.8  --   --  1.7   GFRESTIMATED 66 70   < > 55*   < > 34* 29*   < > 79   AST  --   --   --  15  --   --  11   < >  --    ALT  --   --   --  14  --   --  27   < >  --     < > = values in this interval not displayed.       CBC:   Recent Labs   Lab Test 01/14/19  1202 11/08/18  10/22/18  10/14/18  0757  10/11/18  1657   WBC  --   --   --  4.2  --  6.5  --  9.1   RBC  --   --   --  2.94*  --  2.73*  --  2.62*   HGB 12.6* 9.9*   < > 8.5*   < > 8.2*   < > 7.8*   HCT  --   --   --  27.0*  --  24.2*  --  23.5*   MCV  --   --   --  91.8  --  89  --  90   MCH  --   --   --   --   --  30.0  --  29.8   MCHC  --   --   --   --   --  33.9  --  33.2   RDW  --   --   --  16.5*  --  15.9*  --  16.1*   PLT  --   --   --  188  --  130*  --  152    < > = values in this interval not displayed.       Lipid Panel:  Recent Labs   Lab Test 04/09/18  0951 05/23/14  0745 01/10/14  0923   CHOL 65 134 92   HDL 30* 34* 28*   LDL 23 48 45   TRIG 58 257* 92   CHOLHDLRATIO  --  3.9 3.3       Thyroid:   TSH   Date Value Ref Range Status   10/03/2018 1.11 0.40 - 4.00 mU/L Final     No results found for: T4  Hemoglobin A1C   Date Value Ref Range Status   01/09/2020 14.1 (H) 0 - 5.6 % Final     Comment:     Normal <5.7% Prediabetes 5.7-6.4%  Diabetes 6.5% or higher - adopted from ADA   consensus guidelines.       INR   Date Value Ref Range Status   05/18/2020 2.47 (H) 0.86 - 1.14 Final     INR Point of Care   Date Value Ref Range Status   01/08/2021 3.8 (H) 0.86 - 1.14 Final     Comment:     This test is intended for monitoring Coumadin therapy.  Results are not   accurate in patients with prolonged INR due to factor deficiency.           Cardiac data:    ECG today: NSR with first degree AV block, anterolateral TWI      ECG 1/14/19: NSR with first degree AV block, anterolateral TWI     ECG July 2018  Atrial fibrillation with rate of 75, nonspecific T-wave  changes seen laterally (seen previously)    ECG 04/18: atrial fibrillation that is well controlled, nonspecific T-wave abnormalities laterally.    WALLACE 12/2020: 1. Right WALLACE was not performed. 2. Left WALLACE is not calculated secondary to noncompressible vessels. Normal left digital brachial index. Triphasic waveforms.    Arterial duplex 12/2020: Patent left above-knee to below-knee popliteal artery bypass.    Echo 10/5/18  Left ventricular function  Is moderately reduced, estimated at 35-40%.  There is mild-moderate global hypokinesia of the left ventricle.  Mildly decreased right ventricular systolic function     In comparison with the previous study the LVEF has improved slightly.    RHC 3/2018      LCH 3/2018    Echo 2/2018  Severe left ventricular dilation is present. Biplane LV EF 20%. Severe diffuse hypokinesis is present.  Mild to moderate right ventricular dilation is present.  Global right ventricular function is moderately to severely reduced.  Dilation of the inferior vena cava is present with abnormal respiratory  variation in diameter.  No pericardial effusion is present.  A left pleural effusion is present.    ECG February 2018  Atrial fibrillation nonspecific ST-T abnormalities    EKG 04/06/17:    NSR Rate approx 125 bpm, LAD, no ischemic changes.    Echo (2/24/2017)  Technically difficult study.The patient's rhythm is atrial fibrillation.  The left ventricle is severely dilated. Left ventricular systolic function is  severely reduced, ejection fraction is estimated at 25-30%. There is severe  global hypokinesis.  Right ventricular systolic function is mildly reduced.  The right ventricular systolic pressure is approximated at 15 mmHg plus the  right atrial pressure. IVC is plethoric and estimated mean RA pressure is 15  mmHg.  No pericardial effusion present.    Assessment/Plan:  1. HFrEF, Stage C, NYHA 3  2. CAD  s/p CABG L-LAD, SVG-OM1, SVG-D1 in 1995. s/p LHc in 3/2018 and PCI to mLAD. Not on ASA 2/2  already being   3. Paroxysmal atrial fibrillation  4. Essential Hypertension  5. Dyslipidemia  6. DM Type II  7. PAD with LE stents and bypass   8. R BKA with skin erythema    Mr. Tad Manning is a 81yo M with PMH of HFrEF (Stage C, NYHA 3), CAD, s/p CABG L-LAD, SVG-OM1, SVG-D1 in 1995. 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    Patient appears euvolemic on exam today.  He has a heart rate of 65 with a systolic blood pressure of 135.  He was advised to continue all his current cardiac medications.    In terms of anticoagulation management, recommend changing warfarin to Eliquis 5mg BID. Digoxin level added to today's labs.    Follow up in 6 months.    ATTENDING ATTESTATION:  This patient has been seen and examined by me January 11, 2021 with  Dr. Ramesh MD, cardiology fellow. I have reviewed the vitals, laboratory and imaging data relevant to this patient's care. I have edited this note to reflect our joint assessment and plan, and discussed the plan with the patient.    Mr. Manning returns for follow-up.  He has ischemic cardiomyopathy and chronic systolic heart failure with an ejection fraction around 35-40%. He underwent a coronary angiogram in March 2018 and was noted to have patent grafts and his native  LAD had significant obstructive disease after the LIMA anastomosis which was intervened with placement of drug-eluting stents.   On exam today he is euvolemic.  His blood pressure is controlled. He is in sinus rhythm today. His lipids are well controlled on atorvastatin to 20 mg once daily.  He is on GDMT and is on lisinopril, metoprolol, digoxin, atorva and aspirin. He is on warfarin for paroxysmal atrial fibrillation which I will switch to Eliquis 5 mg twice daily. Dig level today is 1.1.   Follow-up with me in 6 months.     Clarisse Valdes MD,  MS  Professor of Medicine  Cardiovascular division          Please do not hesitate to contact me if you have any questions/concerns.     Sincerely,     Clarisse Valdes MD

## 2021-01-12 LAB — INTERPRETATION ECG - MUSE: NORMAL

## 2021-02-03 ENCOUNTER — ANTICOAGULATION THERAPY VISIT (OUTPATIENT)
Dept: ANTICOAGULATION | Facility: CLINIC | Age: 81
End: 2021-02-03

## 2021-02-03 DIAGNOSIS — Z79.01 LONG TERM CURRENT USE OF ANTICOAGULANT THERAPY: ICD-10-CM

## 2021-02-03 DIAGNOSIS — I48.0 PAROXYSMAL ATRIAL FIBRILLATION (H): ICD-10-CM

## 2021-02-03 NOTE — PROGRESS NOTES
ANTICOAGULATION     Tad Manning is overdue for INR check.      Spoke with [facility] and scheduled lab appointment on 2/10/2021    Vicki Hearn RN

## 2021-02-12 ENCOUNTER — TELEPHONE (OUTPATIENT)
Dept: CARDIOLOGY | Facility: CLINIC | Age: 81
End: 2021-02-12

## 2021-02-12 NOTE — TELEPHONE ENCOUNTER
ANTICOAGULATION     Tad Manning is overdue for INR check.      Spoke to [facility]. Cancelede d/t cold weather. Will try to come in next week.     Due 1/22/21    Patty Joel RN

## 2021-02-16 ENCOUNTER — TELEPHONE (OUTPATIENT)
Dept: ANTICOAGULATION | Facility: CLINIC | Age: 81
End: 2021-02-16

## 2021-02-16 DIAGNOSIS — I48.92 ATRIAL FLUTTER, UNSPECIFIED TYPE (H): ICD-10-CM

## 2021-02-16 DIAGNOSIS — I48.0 PAROXYSMAL ATRIAL FIBRILLATION (H): ICD-10-CM

## 2021-02-16 DIAGNOSIS — Z79.01 LONG TERM CURRENT USE OF ANTICOAGULANT THERAPY: ICD-10-CM

## 2021-02-16 LAB
CAPILLARY BLOOD COLLECTION: NORMAL
INR BLD: 3.6 (ref 0.86–1.14)

## 2021-02-16 PROCEDURE — 85610 PROTHROMBIN TIME: CPT | Performed by: INTERNAL MEDICINE

## 2021-02-16 PROCEDURE — 36416 COLLJ CAPILLARY BLOOD SPEC: CPT | Performed by: INTERNAL MEDICINE

## 2021-02-16 NOTE — TELEPHONE ENCOUNTER
ANTICOAGULATION FOLLOW-UP CLINIC VISIT    Patient Name:  Tad Manning  Date:  2/16/2021  Contact Type:  Telephone    SUBJECTIVE:  Patient Findings     Comments:  Spoke with patient. Gave them their lab results and new warfarin recommendation.  No changes in health, medication, or diet. No missed doses, no falls. No signs or symptoms of bleed or clotting.           Clinical Outcomes     Negatives:  Major bleeding event, Thromboembolic event, Anticoagulation-related hospital admission, Anticoagulation-related ED visit, Anticoagulation-related fatality    Comments:  Spoke with patient. Gave them their lab results and new warfarin recommendation.  No changes in health, medication, or diet. No missed doses, no falls. No signs or symptoms of bleed or clotting.              OBJECTIVE    Recent labs: (last 7 days)     02/16/21  1014   INR 3.6*       ASSESSMENT / PLAN      Anticoagulation Summary  As of 2/16/2021    INR goal:  2.0-3.0   TTR:  62.5 % (8.8 mo)   INR used for dosing:  3.6 (2/16/2021)   Warfarin maintenance plan:  7.5 mg (5 mg x 1.5) every Mon, Fri; 5 mg (5 mg x 1) all other days   Full warfarin instructions:  2/16: 2.5 mg; Otherwise 7.5 mg every Mon, Fri; 5 mg all other days   Weekly warfarin total:  40 mg   Plan last modified:  Debby Phillips RN (2/16/2021)   Next INR check:  3/2/2021   Priority:  High   Target end date:  Indefinite    Indications    Atrial fibrillation -- on Warfarin [I48.91]  Long-term (current) use of anticoagulants [Z79.01] [Z79.01]  Paroxysmal atrial fibrillation (H) [I48.0]             Anticoagulation Episode Summary     INR check location:      Preferred lab:      Send INR reminders to:  [facility]    Comments:  HIPPA Infor returned. OK to  on cell [phone].  labs @ [facility] faxed there 2/16.  fax: [phone]    Goes by [facility]      Anticoagulation Care Providers     Provider Role Specialty Phone number    Clarisse Valdes MD Referring Cardiovascular  Disease [phone]            See the Encounter Report to view Anticoagulation Flowsheet and Dosing Calendar (Go to Encounters tab in chart review, and find the Anticoagulation Therapy Visit)    INR/CFX/F2 Result: 3.6  Goal Range: 2-3  Assessment: negative for changes, bruising to hands-unchanged  Dosing Adjustment: 2.5mg today, then decrease maintenance dose 5.9%.   Next INR/CFX/F2: two weeks  Protocol Followed: 2-3    TASNEEM PERRY RN

## 2021-03-08 ENCOUNTER — TELEPHONE (OUTPATIENT)
Dept: ANTICOAGULATION | Facility: CLINIC | Age: 81
End: 2021-03-08

## 2021-03-08 NOTE — TELEPHONE ENCOUNTER
ANTICOAGULATION     Tad Manning is overdue for INR check.      spoke with [facility] who declined to schedule a lab appointment at this time. If calling back please schedule as soon as possible.     Pt reports he will get an INR either Wednesday and Thursday at [facility]. Decline help making this appointment.     Ellen Santizo RN

## 2021-04-02 ENCOUNTER — TELEPHONE (OUTPATIENT)
Dept: ANTICOAGULATION | Facility: CLINIC | Age: 81
End: 2021-04-02

## 2021-04-02 NOTE — TELEPHONE ENCOUNTER
ANTICOAGULATION     Tad Manning is overdue for INR check.      spoke with [facility] who declined to schedule a lab appointment at this time. If calling back please schedule as soon as possible.    TASNEEM PERRY RN

## 2021-04-06 ENCOUNTER — TRANSFERRED RECORDS (OUTPATIENT)
Dept: HEALTH INFORMATION MANAGEMENT | Facility: CLINIC | Age: 81
End: 2021-04-06

## 2021-04-09 ENCOUNTER — TELEPHONE (OUTPATIENT)
Dept: CARDIOLOGY | Facility: CLINIC | Age: 81
End: 2021-04-09

## 2021-04-09 DIAGNOSIS — I48.0 PAROXYSMAL A-FIB (H): ICD-10-CM

## 2021-04-09 NOTE — TELEPHONE ENCOUNTER
[facility] Call Center    Phone Message    May a detailed message be left on voicemail: yes     Reason for Call: Medication Refill Request    Has the patient contacted the pharmacy for the refill? Yes   Name of medication being requested: Eliquis  Provider who prescribed the medication: Dr. Valdes  Pharmacy: [facility]  Date medication is needed: 04/09/2021         Action Taken: Message routed to:  [facility] ([facility]): Cardio    Travel Screening: Not Applicable

## 2021-04-10 ENCOUNTER — HEALTH MAINTENANCE LETTER (OUTPATIENT)
Age: 81
End: 2021-04-10

## 2021-04-16 ENCOUNTER — MEDICAL CORRESPONDENCE (OUTPATIENT)
Dept: HEALTH INFORMATION MANAGEMENT | Facility: CLINIC | Age: 81
End: 2021-04-16

## 2021-04-16 ENCOUNTER — HOSPITAL ENCOUNTER (OUTPATIENT)
Dept: LAB | Facility: CLINIC | Age: 81
Discharge: HOME | End: 2021-04-16
Admitting: INTERNAL MEDICINE
Payer: COMMERCIAL

## 2021-04-16 ENCOUNTER — ANTICOAGULATION THERAPY VISIT (OUTPATIENT)
Dept: ANTICOAGULATION | Facility: CLINIC | Age: 81
End: 2021-04-16

## 2021-04-16 ENCOUNTER — TRANSFERRED RECORDS (OUTPATIENT)
Dept: HEALTH INFORMATION MANAGEMENT | Facility: CLINIC | Age: 81
End: 2021-04-16

## 2021-04-16 DIAGNOSIS — I48.0 PAROXYSMAL ATRIAL FIBRILLATION (H): ICD-10-CM

## 2021-04-16 DIAGNOSIS — N17.9 AKI (ACUTE KIDNEY INJURY) (H): ICD-10-CM

## 2021-04-16 DIAGNOSIS — M62.81 GENERALIZED MUSCLE WEAKNESS: ICD-10-CM

## 2021-04-16 DIAGNOSIS — M62.81 GENERALIZED MUSCLE WEAKNESS: Primary | ICD-10-CM

## 2021-04-16 DIAGNOSIS — Z79.01 LONG TERM CURRENT USE OF ANTICOAGULANT THERAPY: ICD-10-CM

## 2021-04-16 DIAGNOSIS — N18.30 CRF (CHRONIC RENAL FAILURE), STAGE 3 (MODERATE) (H): ICD-10-CM

## 2021-04-16 LAB
ALBUMIN SERPL-MCNC: 3.6 G/DL (ref 3.4–5)
ANION GAP SERPL CALCULATED.3IONS-SCNC: 4 MMOL/L (ref 3–14)
BUN SERPL-MCNC: 26 MG/DL (ref 7–30)
CALCIUM SERPL-MCNC: 9.4 MG/DL (ref 8.5–10.1)
CHLORIDE SERPL-SCNC: 105 MMOL/L (ref 94–109)
CO2 SERPL-SCNC: 28 MMOL/L (ref 20–32)
CREAT SERPL-MCNC: 1.41 MG/DL (ref 0.66–1.25)
GFR SERPL CREATININE-BSD FRML MDRD: 47 ML/MIN/{1.73_M2}
GLUCOSE SERPL-MCNC: 128 MG/DL (ref 70–99)
INR PPP: 1.05 (ref 0.86–1.14)
PHOSPHATE SERPL-MCNC: 2.9 MG/DL (ref 2.5–4.5)
POTASSIUM SERPL-SCNC: 4.5 MMOL/L (ref 3.4–5.3)
SODIUM SERPL-SCNC: 137 MMOL/L (ref 133–144)

## 2021-04-16 PROCEDURE — 86901 BLOOD TYPING SEROLOGIC RH(D): CPT | Performed by: INTERNAL MEDICINE

## 2021-04-16 PROCEDURE — 86900 BLOOD TYPING SEROLOGIC ABO: CPT | Performed by: INTERNAL MEDICINE

## 2021-04-16 PROCEDURE — 86850 RBC ANTIBODY SCREEN: CPT | Performed by: INTERNAL MEDICINE

## 2021-04-16 PROCEDURE — 86923 COMPATIBILITY TEST ELECTRIC: CPT | Performed by: INTERNAL MEDICINE

## 2021-04-16 PROCEDURE — 36415 COLL VENOUS BLD VENIPUNCTURE: CPT | Performed by: INTERNAL MEDICINE

## 2021-04-16 PROCEDURE — 80069 RENAL FUNCTION PANEL: CPT | Performed by: INTERNAL MEDICINE

## 2021-04-16 PROCEDURE — 85610 PROTHROMBIN TIME: CPT | Performed by: INTERNAL MEDICINE

## 2021-04-16 NOTE — PROGRESS NOTES
ANTICOAGULATION  MANAGEMENT    Tad Manning is being discharged from the [facility] Anticoagulation Management Program (ACC).    Reason for discharge: warfarin replaced by alternate therapy, apixaban ANTICOAGULANT (ELIQUIS ANTICOAGULANT) 5 MG [id number]    Anticoagulation episode resolved, ACC referral closed and INR Standing order discontinued    If patient needs warfarin management in the future, please send a new referral     Darien Jiménez RN

## 2021-04-18 ENCOUNTER — HOSPITAL ENCOUNTER (OUTPATIENT)
Facility: CLINIC | Age: 81
Discharge: HOME | End: 2021-04-18
Attending: INTERNAL MEDICINE | Admitting: INTERNAL MEDICINE
Payer: COMMERCIAL

## 2021-04-18 VITALS
DIASTOLIC BLOOD PRESSURE: 59 MMHG | TEMPERATURE: 97.7 F | BODY MASS INDEX: 24.24 KG/M2 | OXYGEN SATURATION: 98 % | HEART RATE: 62 BPM | HEIGHT: 70 IN | RESPIRATION RATE: 18 BRPM | WEIGHT: 169.3 LBS | SYSTOLIC BLOOD PRESSURE: 156 MMHG

## 2021-04-18 LAB
ABO + RH BLD: NORMAL
ABO + RH BLD: NORMAL
BLD GP AB SCN SERPL QL: NORMAL
BLD PROD TYP BPU: NORMAL
BLD PROD TYP BPU: NORMAL
BLD UNIT ID BPU: 0
BLOOD BANK CMNT PATIENT-IMP: NORMAL
BLOOD PRODUCT CODE: NORMAL
BPU ID: NORMAL
NUM BPU REQUESTED: 1
SPECIMEN EXP DATE BLD: NORMAL
TRANSFUSION STATUS PATIENT QL: NORMAL
TRANSFUSION STATUS PATIENT QL: NORMAL

## 2021-04-18 PROCEDURE — 36430 TRANSFUSION BLD/BLD COMPNT: CPT

## 2021-04-18 PROCEDURE — P9016 RBC LEUKOCYTES REDUCED: HCPCS | Performed by: INTERNAL MEDICINE

## 2021-04-18 PROCEDURE — 999N000119 HC STATISTIC OUTPATIENT (NON-OBS) DAY

## 2021-04-18 ASSESSMENT — MIFFLIN-ST. JEOR: SCORE: 1494.19

## 2021-04-18 NOTE — PROGRESS NOTES
Nursing note  Pt is 80 yrs old who came in as DA for blood transfusion. 20 g was placed on left lower forearm. Pt accompanied by his cousin. Pt a/o x 4. Transfusion was started at 10:30 am and finished at 14:23 pm at rate 100 ml/hr. Pt tolerated it well. No transfusion reaction noted. Pt went home after.

## 2021-05-30 ENCOUNTER — HEALTH MAINTENANCE LETTER (OUTPATIENT)
Age: 81
End: 2021-05-30

## 2021-06-19 ENCOUNTER — APPOINTMENT (OUTPATIENT)
Dept: CT IMAGING | Facility: CLINIC | Age: 81
DRG: 280 | End: 2021-06-19
Attending: EMERGENCY MEDICINE
Payer: COMMERCIAL

## 2021-06-19 ENCOUNTER — HOSPITAL ENCOUNTER (INPATIENT)
Facility: CLINIC | Age: 81
LOS: 8 days | Discharge: NURSING, REHAB OR LONG-TERM CARE | DRG: 280 | End: 2021-06-28
Attending: EMERGENCY MEDICINE | Admitting: HOSPITALIST
Payer: COMMERCIAL

## 2021-06-19 DIAGNOSIS — I50.22 CHRONIC SYSTOLIC HEART FAILURE (H): ICD-10-CM

## 2021-06-19 DIAGNOSIS — R29.6 RECURRENT FALLS: ICD-10-CM

## 2021-06-19 DIAGNOSIS — E11.22 TYPE 2 DIABETES MELLITUS WITH STAGE 3B CHRONIC KIDNEY DISEASE, UNSPECIFIED WHETHER LONG TERM INSULIN USE (H): ICD-10-CM

## 2021-06-19 DIAGNOSIS — G47.00 INSOMNIA, UNSPECIFIED TYPE: ICD-10-CM

## 2021-06-19 DIAGNOSIS — R41.0 CONFUSION: ICD-10-CM

## 2021-06-19 DIAGNOSIS — I24.9 ACS (ACUTE CORONARY SYNDROME) (H): Primary | ICD-10-CM

## 2021-06-19 DIAGNOSIS — N17.9 AKI (ACUTE KIDNEY INJURY) (H): ICD-10-CM

## 2021-06-19 DIAGNOSIS — N18.32 TYPE 2 DIABETES MELLITUS WITH STAGE 3B CHRONIC KIDNEY DISEASE, UNSPECIFIED WHETHER LONG TERM INSULIN USE (H): ICD-10-CM

## 2021-06-19 DIAGNOSIS — D50.8 OTHER IRON DEFICIENCY ANEMIA: ICD-10-CM

## 2021-06-19 LAB
ALBUMIN SERPL-MCNC: 4 G/DL (ref 3.4–5)
ALBUMIN UR-MCNC: 200 MG/DL
ALP SERPL-CCNC: 84 U/L (ref 40–150)
ALT SERPL W P-5'-P-CCNC: 26 U/L (ref 0–70)
ANION GAP SERPL CALCULATED.3IONS-SCNC: 9 MMOL/L (ref 3–14)
APPEARANCE UR: CLEAR
AST SERPL W P-5'-P-CCNC: 32 U/L (ref 0–45)
BASOPHILS # BLD AUTO: 0 10E9/L (ref 0–0.2)
BASOPHILS NFR BLD AUTO: 0.4 %
BILIRUB SERPL-MCNC: 1.1 MG/DL (ref 0.2–1.3)
BILIRUB UR QL STRIP: NEGATIVE
BUN SERPL-MCNC: 33 MG/DL (ref 7–30)
CALCIUM SERPL-MCNC: 10 MG/DL (ref 8.5–10.1)
CHLORIDE SERPL-SCNC: 95 MMOL/L (ref 94–109)
CO2 SERPL-SCNC: 24 MMOL/L (ref 20–32)
COLOR UR AUTO: ABNORMAL
CREAT SERPL-MCNC: 1.4 MG/DL (ref 0.66–1.25)
DIFFERENTIAL METHOD BLD: ABNORMAL
EOSINOPHIL # BLD AUTO: 0 10E9/L (ref 0–0.7)
EOSINOPHIL NFR BLD AUTO: 0 %
ERYTHROCYTE [DISTWIDTH] IN BLOOD BY AUTOMATED COUNT: 14.6 % (ref 10–15)
GFR SERPL CREATININE-BSD FRML MDRD: 48 ML/MIN/{1.73_M2}
GLUCOSE BLDC GLUCOMTR-MCNC: 473 MG/DL (ref 70–99)
GLUCOSE SERPL-MCNC: 487 MG/DL (ref 70–99)
GLUCOSE UR STRIP-MCNC: >1000 MG/DL
HCT VFR BLD AUTO: 32.7 % (ref 40–53)
HGB BLD-MCNC: 11.2 G/DL (ref 13.3–17.7)
HGB UR QL STRIP: ABNORMAL
IMM GRANULOCYTES # BLD: 0.1 10E9/L (ref 0–0.4)
IMM GRANULOCYTES NFR BLD: 1.5 %
KETONES UR STRIP-MCNC: 10 MG/DL
LEUKOCYTE ESTERASE UR QL STRIP: NEGATIVE
LYMPHOCYTES # BLD AUTO: 0.5 10E9/L (ref 0.8–5.3)
LYMPHOCYTES NFR BLD AUTO: 10.4 %
MCH RBC QN AUTO: 31.1 PG (ref 26.5–33)
MCHC RBC AUTO-ENTMCNC: 34.3 G/DL (ref 31.5–36.5)
MCV RBC AUTO: 91 FL (ref 78–100)
MONOCYTES # BLD AUTO: 0.8 10E9/L (ref 0–1.3)
MONOCYTES NFR BLD AUTO: 17.2 %
MUCOUS THREADS #/AREA URNS LPF: PRESENT /LPF
NEUTROPHILS # BLD AUTO: 3.4 10E9/L (ref 1.6–8.3)
NEUTROPHILS NFR BLD AUTO: 70.5 %
NITRATE UR QL: NEGATIVE
NRBC # BLD AUTO: 0 10*3/UL
NRBC BLD AUTO-RTO: 0 /100
PH UR STRIP: 6 PH (ref 5–7)
PLATELET # BLD AUTO: 145 10E9/L (ref 150–450)
POTASSIUM SERPL-SCNC: 4.4 MMOL/L (ref 3.4–5.3)
PROT SERPL-MCNC: 7.7 G/DL (ref 6.8–8.8)
RBC # BLD AUTO: 3.6 10E12/L (ref 4.4–5.9)
RBC #/AREA URNS AUTO: 2 /HPF (ref 0–2)
SODIUM SERPL-SCNC: 128 MMOL/L (ref 133–144)
SOURCE: ABNORMAL
SP GR UR STRIP: 1.03 (ref 1–1.03)
SQUAMOUS #/AREA URNS AUTO: 0 /HPF (ref 0–1)
TROPONIN I SERPL-MCNC: 2.3 UG/L (ref 0–0.04)
UROBILINOGEN UR STRIP-MCNC: 0 MG/DL (ref 0–2)
WBC # BLD AUTO: 4.8 10E9/L (ref 4–11)
WBC #/AREA URNS AUTO: <1 /HPF (ref 0–5)

## 2021-06-19 PROCEDURE — 85025 COMPLETE CBC W/AUTO DIFF WBC: CPT | Performed by: EMERGENCY MEDICINE

## 2021-06-19 PROCEDURE — 84484 ASSAY OF TROPONIN QUANT: CPT | Performed by: EMERGENCY MEDICINE

## 2021-06-19 PROCEDURE — 93005 ELECTROCARDIOGRAM TRACING: CPT | Mod: 76 | Performed by: HOSPITALIST

## 2021-06-19 PROCEDURE — 999N001017 HC STATISTIC GLUCOSE BY METER IP

## 2021-06-19 PROCEDURE — C9803 HOPD COVID-19 SPEC COLLECT: HCPCS

## 2021-06-19 PROCEDURE — 81001 URINALYSIS AUTO W/SCOPE: CPT | Performed by: EMERGENCY MEDICINE

## 2021-06-19 PROCEDURE — 96366 THER/PROPH/DIAG IV INF ADDON: CPT | Performed by: HOSPITALIST

## 2021-06-19 PROCEDURE — 80053 COMPREHEN METABOLIC PANEL: CPT | Performed by: EMERGENCY MEDICINE

## 2021-06-19 PROCEDURE — 96361 HYDRATE IV INFUSION ADD-ON: CPT | Performed by: HOSPITALIST

## 2021-06-19 PROCEDURE — 99285 EMERGENCY DEPT VISIT HI MDM: CPT | Mod: 25 | Performed by: HOSPITALIST

## 2021-06-19 PROCEDURE — 93005 ELECTROCARDIOGRAM TRACING: CPT | Performed by: HOSPITALIST

## 2021-06-19 PROCEDURE — 96365 THER/PROPH/DIAG IV INF INIT: CPT | Performed by: HOSPITALIST

## 2021-06-19 PROCEDURE — 70450 CT HEAD/BRAIN W/O DYE: CPT

## 2021-06-19 PROCEDURE — 258N000003 HC RX IP 258 OP 636: Performed by: EMERGENCY MEDICINE

## 2021-06-19 RX ADMIN — SODIUM CHLORIDE 1000 ML: 9 INJECTION, SOLUTION INTRAVENOUS at 23:20

## 2021-06-19 ASSESSMENT — ENCOUNTER SYMPTOMS
APPETITE CHANGE: 1
FREQUENCY: 1
CONFUSION: 1
ABDOMINAL PAIN: 0
NECK PAIN: 0
FEVER: 0
SHORTNESS OF BREATH: 1
BACK PAIN: 0

## 2021-06-20 ENCOUNTER — APPOINTMENT (OUTPATIENT)
Dept: GENERAL RADIOLOGY | Facility: CLINIC | Age: 81
DRG: 280 | End: 2021-06-20
Attending: EMERGENCY MEDICINE
Payer: COMMERCIAL

## 2021-06-20 ENCOUNTER — APPOINTMENT (OUTPATIENT)
Dept: CARDIOLOGY | Facility: CLINIC | Age: 81
DRG: 280 | End: 2021-06-20
Attending: EMERGENCY MEDICINE
Payer: COMMERCIAL

## 2021-06-20 PROBLEM — R41.0 CONFUSION: Status: ACTIVE | Noted: 2021-06-20

## 2021-06-20 PROBLEM — I24.9 ACS (ACUTE CORONARY SYNDROME) (H): Status: ACTIVE | Noted: 2021-06-20

## 2021-06-20 PROBLEM — R29.6 RECURRENT FALLS: Status: ACTIVE | Noted: 2021-06-20

## 2021-06-20 LAB
ALBUMIN SERPL-MCNC: 3.3 G/DL (ref 3.4–5)
ALP SERPL-CCNC: 72 U/L (ref 40–150)
ALT SERPL W P-5'-P-CCNC: 24 U/L (ref 0–70)
ANION GAP SERPL CALCULATED.3IONS-SCNC: 8 MMOL/L (ref 3–14)
APTT PPP: 46 SEC (ref 22–37)
APTT PPP: 64 SEC (ref 22–37)
AST SERPL W P-5'-P-CCNC: 34 U/L (ref 0–45)
BASOPHILS # BLD AUTO: 0 10E9/L (ref 0–0.2)
BASOPHILS NFR BLD AUTO: 0.3 %
BILIRUB SERPL-MCNC: 0.9 MG/DL (ref 0.2–1.3)
BUN SERPL-MCNC: 31 MG/DL (ref 7–30)
CALCIUM SERPL-MCNC: 9.5 MG/DL (ref 8.5–10.1)
CHLORIDE SERPL-SCNC: 102 MMOL/L (ref 94–109)
CO2 SERPL-SCNC: 24 MMOL/L (ref 20–32)
CREAT SERPL-MCNC: 1.36 MG/DL (ref 0.66–1.25)
DIFFERENTIAL METHOD BLD: ABNORMAL
DIGOXIN SERPL-MCNC: 0.4 UG/L (ref 0.5–2)
EOSINOPHIL # BLD AUTO: 0 10E9/L (ref 0–0.7)
EOSINOPHIL NFR BLD AUTO: 0 %
ERYTHROCYTE [DISTWIDTH] IN BLOOD BY AUTOMATED COUNT: 14.6 % (ref 10–15)
ERYTHROCYTE [DISTWIDTH] IN BLOOD BY AUTOMATED COUNT: 14.6 % (ref 10–15)
GFR SERPL CREATININE-BSD FRML MDRD: 49 ML/MIN/{1.73_M2}
GLUCOSE BLDC GLUCOMTR-MCNC: 332 MG/DL (ref 70–99)
GLUCOSE BLDC GLUCOMTR-MCNC: 404 MG/DL (ref 70–99)
GLUCOSE BLDC GLUCOMTR-MCNC: 422 MG/DL (ref 70–99)
GLUCOSE SERPL-MCNC: 283 MG/DL (ref 70–99)
HBA1C MFR BLD: 9 % (ref 0–5.6)
HCT VFR BLD AUTO: 28.7 % (ref 40–53)
HCT VFR BLD AUTO: 29.4 % (ref 40–53)
HGB BLD-MCNC: 10 G/DL (ref 13.3–17.7)
HGB BLD-MCNC: 9.9 G/DL (ref 13.3–17.7)
IMM GRANULOCYTES # BLD: 0 10E9/L (ref 0–0.4)
IMM GRANULOCYTES NFR BLD: 0.6 %
INTERPRETATION ECG - MUSE: NORMAL
INTERPRETATION ECG - MUSE: NORMAL
LABORATORY COMMENT REPORT: NORMAL
LYMPHOCYTES # BLD AUTO: 1 10E9/L (ref 0.8–5.3)
LYMPHOCYTES NFR BLD AUTO: 28.7 %
MCH RBC QN AUTO: 31.3 PG (ref 26.5–33)
MCH RBC QN AUTO: 31.5 PG (ref 26.5–33)
MCHC RBC AUTO-ENTMCNC: 34 G/DL (ref 31.5–36.5)
MCHC RBC AUTO-ENTMCNC: 34.5 G/DL (ref 31.5–36.5)
MCV RBC AUTO: 91 FL (ref 78–100)
MCV RBC AUTO: 92 FL (ref 78–100)
MONOCYTES # BLD AUTO: 0.7 10E9/L (ref 0–1.3)
MONOCYTES NFR BLD AUTO: 19.1 %
NEUTROPHILS # BLD AUTO: 1.8 10E9/L (ref 1.6–8.3)
NEUTROPHILS NFR BLD AUTO: 51.3 %
NRBC # BLD AUTO: 0 10*3/UL
NRBC BLD AUTO-RTO: 0 /100
NT-PROBNP SERPL-MCNC: 7364 PG/ML (ref 0–1800)
OSMOLALITY SERPL: 304 MMOL/KG (ref 280–301)
PHOSPHATE SERPL-MCNC: 3.3 MG/DL (ref 2.5–4.5)
PLATELET # BLD AUTO: 116 10E9/L (ref 150–450)
PLATELET # BLD AUTO: 131 10E9/L (ref 150–450)
POTASSIUM SERPL-SCNC: 3.9 MMOL/L (ref 3.4–5.3)
PROT SERPL-MCNC: 6.7 G/DL (ref 6.8–8.8)
RBC # BLD AUTO: 3.14 10E12/L (ref 4.4–5.9)
RBC # BLD AUTO: 3.2 10E12/L (ref 4.4–5.9)
SARS-COV-2 RNA RESP QL NAA+PROBE: NEGATIVE
SODIUM SERPL-SCNC: 134 MMOL/L (ref 133–144)
SPECIMEN SOURCE: NORMAL
TROPONIN I SERPL-MCNC: 3.63 UG/L (ref 0–0.04)
TROPONIN I SERPL-MCNC: 5.09 UG/L (ref 0–0.04)
TROPONIN I SERPL-MCNC: 5.52 UG/L (ref 0–0.04)
UFH PPP CHRO-ACNC: 0.23 IU/ML
URATE SERPL-MCNC: 6.1 MG/DL (ref 3.5–7.2)
WBC # BLD AUTO: 3.6 10E9/L (ref 4–11)
WBC # BLD AUTO: 3.7 10E9/L (ref 4–11)

## 2021-06-20 PROCEDURE — 36415 COLL VENOUS BLD VENIPUNCTURE: CPT | Performed by: HOSPITALIST

## 2021-06-20 PROCEDURE — 80053 COMPREHEN METABOLIC PANEL: CPT | Performed by: HOSPITALIST

## 2021-06-20 PROCEDURE — 250N000012 HC RX MED GY IP 250 OP 636 PS 637: Performed by: HOSPITALIST

## 2021-06-20 PROCEDURE — 85520 HEPARIN ASSAY: CPT | Performed by: HOSPITALIST

## 2021-06-20 PROCEDURE — 250N000013 HC RX MED GY IP 250 OP 250 PS 637: Performed by: EMERGENCY MEDICINE

## 2021-06-20 PROCEDURE — 80162 ASSAY OF DIGOXIN TOTAL: CPT | Performed by: HOSPITALIST

## 2021-06-20 PROCEDURE — 36415 COLL VENOUS BLD VENIPUNCTURE: CPT | Performed by: EMERGENCY MEDICINE

## 2021-06-20 PROCEDURE — 999N001017 HC STATISTIC GLUCOSE BY METER IP

## 2021-06-20 PROCEDURE — 255N000002 HC RX 255 OP 636: Performed by: EMERGENCY MEDICINE

## 2021-06-20 PROCEDURE — 99222 1ST HOSP IP/OBS MODERATE 55: CPT | Mod: 25 | Performed by: INTERNAL MEDICINE

## 2021-06-20 PROCEDURE — 84484 ASSAY OF TROPONIN QUANT: CPT | Performed by: HOSPITALIST

## 2021-06-20 PROCEDURE — 210N000002 HC R&B HEART CARE

## 2021-06-20 PROCEDURE — 85730 THROMBOPLASTIN TIME PARTIAL: CPT | Performed by: EMERGENCY MEDICINE

## 2021-06-20 PROCEDURE — 83930 ASSAY OF BLOOD OSMOLALITY: CPT | Performed by: HOSPITALIST

## 2021-06-20 PROCEDURE — 85025 COMPLETE CBC W/AUTO DIFF WBC: CPT | Performed by: HOSPITALIST

## 2021-06-20 PROCEDURE — 99223 1ST HOSP IP/OBS HIGH 75: CPT | Mod: AI | Performed by: HOSPITALIST

## 2021-06-20 PROCEDURE — 71046 X-RAY EXAM CHEST 2 VIEWS: CPT

## 2021-06-20 PROCEDURE — 84550 ASSAY OF BLOOD/URIC ACID: CPT | Performed by: HOSPITALIST

## 2021-06-20 PROCEDURE — 87635 SARS-COV-2 COVID-19 AMP PRB: CPT | Performed by: EMERGENCY MEDICINE

## 2021-06-20 PROCEDURE — 99232 SBSQ HOSP IP/OBS MODERATE 35: CPT | Performed by: HOSPITALIST

## 2021-06-20 PROCEDURE — 85730 THROMBOPLASTIN TIME PARTIAL: CPT | Performed by: HOSPITALIST

## 2021-06-20 PROCEDURE — 250N000013 HC RX MED GY IP 250 OP 250 PS 637: Performed by: HOSPITALIST

## 2021-06-20 PROCEDURE — 999N000208 ECHOCARDIOGRAM COMPLETE

## 2021-06-20 PROCEDURE — 83880 ASSAY OF NATRIURETIC PEPTIDE: CPT | Performed by: HOSPITALIST

## 2021-06-20 PROCEDURE — 85027 COMPLETE CBC AUTOMATED: CPT | Performed by: HOSPITALIST

## 2021-06-20 PROCEDURE — 250N000011 HC RX IP 250 OP 636: Performed by: EMERGENCY MEDICINE

## 2021-06-20 PROCEDURE — 84100 ASSAY OF PHOSPHORUS: CPT | Performed by: HOSPITALIST

## 2021-06-20 PROCEDURE — 83036 HEMOGLOBIN GLYCOSYLATED A1C: CPT | Performed by: HOSPITALIST

## 2021-06-20 PROCEDURE — 93306 TTE W/DOPPLER COMPLETE: CPT | Mod: 26 | Performed by: INTERNAL MEDICINE

## 2021-06-20 RX ORDER — METOPROLOL SUCCINATE 100 MG/1
100 TABLET, EXTENDED RELEASE ORAL DAILY
Status: DISCONTINUED | OUTPATIENT
Start: 2021-06-20 | End: 2021-06-28 | Stop reason: HOSPADM

## 2021-06-20 RX ORDER — POLYETHYLENE GLYCOL 3350 17 G/17G
17 POWDER, FOR SOLUTION ORAL DAILY PRN
Status: DISCONTINUED | OUTPATIENT
Start: 2021-06-20 | End: 2021-06-28 | Stop reason: HOSPADM

## 2021-06-20 RX ORDER — LANOLIN ALCOHOL/MO/W.PET/CERES
3 CREAM (GRAM) TOPICAL
Status: DISCONTINUED | OUTPATIENT
Start: 2021-06-20 | End: 2021-06-28 | Stop reason: HOSPADM

## 2021-06-20 RX ORDER — ONDANSETRON 4 MG/1
4 TABLET, ORALLY DISINTEGRATING ORAL EVERY 6 HOURS PRN
Status: DISCONTINUED | OUTPATIENT
Start: 2021-06-20 | End: 2021-06-28 | Stop reason: HOSPADM

## 2021-06-20 RX ORDER — ONDANSETRON 2 MG/ML
4 INJECTION INTRAMUSCULAR; INTRAVENOUS EVERY 6 HOURS PRN
Status: DISCONTINUED | OUTPATIENT
Start: 2021-06-20 | End: 2021-06-28 | Stop reason: HOSPADM

## 2021-06-20 RX ORDER — NICOTINE POLACRILEX 4 MG
15-30 LOZENGE BUCCAL
Status: DISCONTINUED | OUTPATIENT
Start: 2021-06-20 | End: 2021-06-28 | Stop reason: HOSPADM

## 2021-06-20 RX ORDER — ALLOPURINOL 100 MG/1
100 TABLET ORAL DAILY
Status: ON HOLD | COMMUNITY
End: 2021-06-20

## 2021-06-20 RX ORDER — ALLOPURINOL 300 MG/1
300 TABLET ORAL DAILY
Status: ON HOLD | COMMUNITY
End: 2021-09-25

## 2021-06-20 RX ORDER — DEXTROSE MONOHYDRATE 25 G/50ML
25-50 INJECTION, SOLUTION INTRAVENOUS
Status: DISCONTINUED | OUTPATIENT
Start: 2021-06-20 | End: 2021-06-28 | Stop reason: HOSPADM

## 2021-06-20 RX ORDER — ASPIRIN 81 MG/1
81 TABLET, CHEWABLE ORAL DAILY
Status: DISCONTINUED | OUTPATIENT
Start: 2021-06-21 | End: 2021-06-28 | Stop reason: HOSPADM

## 2021-06-20 RX ORDER — AMOXICILLIN 250 MG
1 CAPSULE ORAL 2 TIMES DAILY PRN
Status: DISCONTINUED | OUTPATIENT
Start: 2021-06-20 | End: 2021-06-28 | Stop reason: HOSPADM

## 2021-06-20 RX ORDER — DIGOXIN 125 MCG
125 TABLET ORAL DAILY
Status: DISCONTINUED | OUTPATIENT
Start: 2021-06-20 | End: 2021-06-28 | Stop reason: HOSPADM

## 2021-06-20 RX ORDER — ATORVASTATIN CALCIUM 20 MG/1
20 TABLET, FILM COATED ORAL DAILY
Status: DISCONTINUED | OUTPATIENT
Start: 2021-06-20 | End: 2021-06-28 | Stop reason: HOSPADM

## 2021-06-20 RX ORDER — HEPARIN SODIUM 10000 [USP'U]/100ML
0-5000 INJECTION, SOLUTION INTRAVENOUS CONTINUOUS
Status: DISCONTINUED | OUTPATIENT
Start: 2021-06-20 | End: 2021-06-22

## 2021-06-20 RX ORDER — ASPIRIN 81 MG/1
324 TABLET, CHEWABLE ORAL ONCE
Status: DISCONTINUED | OUTPATIENT
Start: 2021-06-20 | End: 2021-06-20

## 2021-06-20 RX ORDER — BISACODYL 10 MG
10 SUPPOSITORY, RECTAL RECTAL DAILY PRN
Status: DISCONTINUED | OUTPATIENT
Start: 2021-06-20 | End: 2021-06-28 | Stop reason: HOSPADM

## 2021-06-20 RX ORDER — TRAZODONE HYDROCHLORIDE 50 MG/1
50 TABLET, FILM COATED ORAL
Status: DISCONTINUED | OUTPATIENT
Start: 2021-06-20 | End: 2021-06-28 | Stop reason: HOSPADM

## 2021-06-20 RX ORDER — ACETAMINOPHEN 325 MG/1
650 TABLET ORAL EVERY 4 HOURS PRN
Status: DISCONTINUED | OUTPATIENT
Start: 2021-06-20 | End: 2021-06-22

## 2021-06-20 RX ORDER — PANTOPRAZOLE SODIUM 40 MG/1
40 TABLET, DELAYED RELEASE ORAL DAILY
Status: DISCONTINUED | OUTPATIENT
Start: 2021-06-20 | End: 2021-06-28 | Stop reason: HOSPADM

## 2021-06-20 RX ORDER — LISINOPRIL 5 MG/1
5 TABLET ORAL DAILY
Status: DISCONTINUED | OUTPATIENT
Start: 2021-06-20 | End: 2021-06-21

## 2021-06-20 RX ORDER — ASPIRIN 325 MG
325 TABLET ORAL ONCE
Status: COMPLETED | OUTPATIENT
Start: 2021-06-20 | End: 2021-06-20

## 2021-06-20 RX ORDER — CITALOPRAM HYDROBROMIDE 20 MG/1
20 TABLET ORAL EVERY EVENING
Status: DISCONTINUED | OUTPATIENT
Start: 2021-06-20 | End: 2021-06-28 | Stop reason: HOSPADM

## 2021-06-20 RX ORDER — ACETAMINOPHEN 650 MG/1
650 SUPPOSITORY RECTAL EVERY 4 HOURS PRN
Status: DISCONTINUED | OUTPATIENT
Start: 2021-06-20 | End: 2021-06-28 | Stop reason: HOSPADM

## 2021-06-20 RX ORDER — ALLOPURINOL 300 MG/1
300 TABLET ORAL DAILY
Status: DISCONTINUED | OUTPATIENT
Start: 2021-06-20 | End: 2021-06-28 | Stop reason: HOSPADM

## 2021-06-20 RX ORDER — HEPARIN SODIUM 10000 [USP'U]/100ML
0-5000 INJECTION, SOLUTION INTRAVENOUS CONTINUOUS
Status: DISCONTINUED | OUTPATIENT
Start: 2021-06-20 | End: 2021-06-20

## 2021-06-20 RX ORDER — AMOXICILLIN 250 MG
2 CAPSULE ORAL 2 TIMES DAILY PRN
Status: DISCONTINUED | OUTPATIENT
Start: 2021-06-20 | End: 2021-06-28 | Stop reason: HOSPADM

## 2021-06-20 RX ORDER — LIDOCAINE 40 MG/G
CREAM TOPICAL
Status: DISCONTINUED | OUTPATIENT
Start: 2021-06-20 | End: 2021-06-21

## 2021-06-20 RX ADMIN — HEPARIN SODIUM 900 UNITS/HR: 10000 INJECTION, SOLUTION INTRAVENOUS at 01:08

## 2021-06-20 RX ADMIN — PANTOPRAZOLE SODIUM 40 MG: 40 TABLET, DELAYED RELEASE ORAL at 13:42

## 2021-06-20 RX ADMIN — HEPARIN SODIUM 1050 UNITS/HR: 10000 INJECTION, SOLUTION INTRAVENOUS at 09:40

## 2021-06-20 RX ADMIN — ASPIRIN 325 MG ORAL TABLET 325 MG: 325 PILL ORAL at 00:49

## 2021-06-20 RX ADMIN — CITALOPRAM HYDROBROMIDE 20 MG: 20 TABLET ORAL at 20:06

## 2021-06-20 RX ADMIN — INSULIN ASPART 3 UNITS: 100 INJECTION, SOLUTION INTRAVENOUS; SUBCUTANEOUS at 09:42

## 2021-06-20 RX ADMIN — INSULIN GLARGINE 15 UNITS: 100 INJECTION, SOLUTION SUBCUTANEOUS at 22:07

## 2021-06-20 RX ADMIN — ALLOPURINOL 300 MG: 300 TABLET ORAL at 15:31

## 2021-06-20 RX ADMIN — LISINOPRIL 5 MG: 5 TABLET ORAL at 13:42

## 2021-06-20 RX ADMIN — DIGOXIN 125 MCG: 125 TABLET ORAL at 13:42

## 2021-06-20 RX ADMIN — HEPARIN SODIUM 1050 UNITS/HR: 10000 INJECTION, SOLUTION INTRAVENOUS at 22:03

## 2021-06-20 RX ADMIN — ATORVASTATIN CALCIUM 20 MG: 20 TABLET, FILM COATED ORAL at 13:42

## 2021-06-20 RX ADMIN — METOPROLOL SUCCINATE 100 MG: 100 TABLET, EXTENDED RELEASE ORAL at 13:42

## 2021-06-20 RX ADMIN — HUMAN ALBUMIN MICROSPHERES AND PERFLUTREN 9 ML: 10; .22 INJECTION, SOLUTION INTRAVENOUS at 02:25

## 2021-06-20 RX ADMIN — INSULIN ASPART 6 UNITS: 100 INJECTION, SOLUTION INTRAVENOUS; SUBCUTANEOUS at 04:39

## 2021-06-20 ASSESSMENT — MIFFLIN-ST. JEOR: SCORE: 1416.17

## 2021-06-20 NOTE — ED TRIAGE NOTES
Patient arrived here in the ED with his niece who stated that the patient has fallen twice in the last 24 hours, he is on Elequis but has not taken it for a day and a half. Patient denied hitting his head and denied any other injuries. Patient has also had recent changes in mentation including memory changes, some confusion, and he has been unable to properly manage his diabetic and other medications. Today, patient is oriented to name and situation, unable to answer some questions appropriately. Niece checked patient's blood sugar today and it was 475.

## 2021-06-20 NOTE — ED NOTES
Patient returned from imaging exam. Placed back on ECG NIBP and SaO2 monitoring. Patient and guest updated on continued POC and waits. Continue to monitor.

## 2021-06-20 NOTE — ED NOTES
[facility]  ED Nurse Handoff Report    ED Chief complaint: Fall      ED Diagnosis:   Final diagnoses:   ACS (acute coronary syndrome) (H)   Confusion   Recurrent falls       Code Status: To be addressed by admitting MD    Allergies:   Allergies   Allergen Reactions     Blood Transfusion Related (Informational Only) Other (See Comments)     Patient has a history of a clinically significant antibody against RBC antigens.  A delay in compatible RBCs may occur.        Patient Story: Hima is a 80 year old male with history of CLL, hypertension, NIDDM, CAD, CHF, paroxysmal atrial fibrillation, and hyperlipidemia who is brought to the ED by family after falling twice in the past 24 hours with the most recent fall being tonight. After tonight's fall, his nephew noticed some blood near his stump on his RLL. Patient reports he has been experiencing some mild intermittent shortness of breath for three weeks. He drinks plenty of fluids but has not been eating as much food. He has been urinating more frequently. He denies any fever, neck pain, back pain, or abdominal pain. He was previously on Warfarin but recently was changed to Eliquis. Per niece at bedside, she has noticed increased confusion and unsteadiness over the past week, patient has been forgetting some medications at times, including his Eliquis.     Focused Assessment:  Alert and oriented to person, place, time and somewhat to situation. Patient is frequently forgetful. Patient notes decreased appetite of late. No fevers. Mild, intermittent shorntess of breath. None noted while in ED. SaO2 remain > 94% on RA. Patient has urinary frequency. Denies pain with urination. Patient has redness to skin to RLE stump and perineum. Areas of open skin on posterior buttocks. Redness is blanchable.     Treatments and/or interventions provided: IV, Labs, IVFs, ASA, Heparin, CT, Echocardiogram  Patient's response to treatments and/or interventions: Tolerated  well    To be done/followed up on inpatient unit:  N/A    Does this patient have any cognitive concerns?: Short term memory loss and Forgetful    Activity level - Baseline/Home:  Independent  Activity Level - Current:   Stand with Assist    Patient's Preferred language: English   Needed?: No    Isolation: None  Infection: Not Applicable  Patient tested for COVID 19 prior to admission: YES  Bariatric?: No    Vital Signs:   Vitals:    06/19/21 2238 06/19/21 2346   BP: (!) 142/71 (!) 160/84   Pulse: 116 107   Resp: 18 15   Temp: 97.6  F (36.4  C)    TempSrc: Temporal    SpO2: 97% 98%   Weight: 76.7 kg (169 lb)        Cardiac Rhythm:     Was the PSS-3 completed:   Yes  What interventions are required if any?               Family Comments: Niece at bedside.  OBS brochure/video discussed/provided to patient/family: No              Name of person given brochure if not patient: N/A              Relationship to patient: N/A    For the majority of the shift this patient's behavior was Green.   Behavioral interventions performed were N/A.    ED NURSE PHONE NUMBER: [phone]

## 2021-06-20 NOTE — ED PROVIDER NOTES
History   Chief Complaint:  Fall     HPI   Tad Manning is a 80 year old male with history of CLL, hypertension, T2DM, CAD, CHF, paroxysmal atrial fibrillation, and hyperlipidemia who presents after falling twice in the past 24 hours with the most recent fall being tonight. After tonight's fall, his nephew noticed some blood near his stump. He has been experiencing shortness of breath for three weeks. He drinks plenty of fluids but has not been eating as much food. He has been urinating more frequently. He denies any fever, neck pain, back pain, or abdominal pain. He was previously on Warfarin but replaced that with Eliquis recently. Per niece at bedside, she has noticed increased confusion and unsteadiness over the past week.    Review of Systems   Constitutional: Positive for appetite change. Negative for fever.   Respiratory: Positive for shortness of breath.    Gastrointestinal: Negative for abdominal pain.   Genitourinary: Positive for frequency.   Musculoskeletal: Negative for back pain and neck pain.   Psychiatric/Behavioral: Positive for confusion.   All other systems reviewed and are negative.    Allergies:  Blood Transfusion Related (Informational Only)    Medications:  Eliquis  Aspirin 81 mg  Atorvastatin  Lasix  Insulin  Metoprolol succinate  Nitrostat   Celexa  Lanoxin  Metformin  Protonix  Miralax  Trazodone     Past Medical History:    Arthritis  ASCVD  BPH  Cellulitis  Chronic lymphocytic leukemia   Acute kidney injury  Osteomyelitis of left foot  Peripheral vascular disease  Hypertension  Hyperlipidemia  Pericardial effusion  T2DM  History of blood transfusion   CAD   Paroxysmal atrial fibrillation  CHF  CKD stage III     Past Surgical History:    Right leg amputation below knee  Amputate toes  Angioplasty with stent placement  Bypass graft gemoropopliteal   Laminectomy   Tonsillectomy   Vascular surgery      Family History:    Leukemia - mother    Social History:  Patient is accompanied  "by his niece.  He has a roommate and is also currently living with his nephew Gene.    Physical Exam     Patient Vitals for the past 24 hrs:   BP Temp Temp src Pulse Resp SpO2 Height Weight   06/20/21 0400 121/67 98.3  F (36.8  C) Oral 102 20 98 % -- --   06/20/21 0300 136/62 98.3  F (36.8  C) Oral 96 14 93 % 1.778 m (5' 10\") 69 kg (152 lb 1.6 oz)   06/20/21 0200 (!) 153/121 -- -- 105 18 99 % -- --   06/20/21 0130 (!) 155/85 -- -- 101 14 -- -- --   06/20/21 0100 (!) 171/96 -- -- 109 14 97 % -- --   06/20/21 0030 (!) 163/86 -- -- 98 16 98 % -- --   06/19/21 2346 (!) 160/84 -- -- 107 15 98 % -- --   06/19/21 2238 (!) 142/71 97.6  F (36.4  C) Temporal 116 18 97 % -- 76.7 kg (169 lb)     Physical Exam  General: Confused but pleasant, appears well-developed and well-nourished. Cooperative.     In mild distress  HEENT:  Head:  Atraumatic  Ears:  External ears are normal  Mouth/Throat:  Oropharynx is without erythema or exudate and mucous membranes are moist.   Eyes:   Conjunctivae normal and EOM are normal. No scleral icterus.    Pupils are equal, round, and reactive to light.   CV:  Normal rate, regular rhythm, normal heart sounds and radial pulses are 2+ and symmetric.  No murmur.  Resp:  Breath sounds are clear bilaterally    Non-labored, no retractions or accessory muscle use  GI:  Abdomen is soft, no distension, no tenderness. No rebound or guarding.  No CVA tenderness bilaterally  MS:  Normal range of motion. No edema.    Normal strength in all 4 extremities.     Right BKA.  Stump is mildly erythematous with mild skin breakdown.     Back atraumatic.    No midline cervical, thoracic, or lumbar tenderness  Skin:  Warm and dry.  Mild erythema to right BKA stump, mild skin abrasion.   Neuro: Confused. Normal strength.  Sensation intact in all 4 extremities. GCS: 14  Psych:  Normal mood and affect.    Emergency Department Course   ECG  ECG taken at 2319, ECG read at 2322  Sinus tachycardia. Left axis deviation. Left " ventricular hypertrophy with QRS widening and repolarization abnormality. Abnormal ECG.   No significant changes as compared to prior, dated 1/11/21.  Rate 111 bpm. OK interval 196 ms. QRS duration 130 ms. QT/QTc 372/505 ms. P-R-T axes * -52 94.     ECG  ECG taken at 0014, ECG read at 0039  Unusual P axis, possible ectopic atrial tachycardia. Left axis deviation. Left ventricular hypertrophy with QRS widening and repolarization abnormality. Abnormal ECG.   No significant changes as compared to prior, dated 6/19/21.  Rate 105 bpm. OK interval 214 ms. QRS duration 132 ms. QT/QTc 356/470 ms. P-R-T axes 269 -50 90.     Imaging:  CT Head w/o Contrast  1.  No acute intracranial hemorrhage or calvarial fracture.  2.  Mild to moderate volume loss and presumed chronic small vessel ischemic changes.  As read by Radiology.    XR Chest 2 Views  No evidence for CHF or pneumonia. Upper normal heart size. Prior sternotomy and CABG procedure. No pleural effusion or pneumothorax. Extensive mid and lower thoracic spinal fusion.  As read by Radiology.    Echocardiogram  pending    Laboratory:  CMP: sodium 128(L), glucose 487(H), urea nitrogen 33(H), creatinine 1.40(H), GFR estimate 48(L) o/w WNL   CBC: WBC 4.8, HGB 11.2(L), (L)    Glucose 2250: 473(H)  PTT: pending  Troponin (Collected 2237): 2.304(HH)    UA with microscopic: glucose >1000, ketones 10, blood - moderate, protein albumin 200, mucous - present, o/w WNL    Asymptomatic COVID19 Virus PCR by nasopharyngeal swab: negative    Emergency Department Course:  Reviewed:  I reviewed nursing notes, vitals, past medical history and care everywhere    Assessments:  2305 I obtained history and examined the patient as noted above.     Consults:   0059 I spoke with Dr. Rodas admitting hospitalist.     Interventions:  Medications   heparin 25,000 units in 0.45% NaCl 250 mL ANTICOAGULANT infusion (900 Units/hr Intravenous New Bag 6/20/21 0108)   lidocaine 1 % 0.1-1 mL (has no  administration in time range)   lidocaine (LMX4) cream (has no administration in time range)   sodium chloride (PF) 0.9% PF flush 3 mL (3 mLs Intracatheter Not Given 6/20/21 0316)   sodium chloride (PF) 0.9% PF flush 3 mL (has no administration in time range)   acetaminophen (TYLENOL) tablet 650 mg (has no administration in time range)   acetaminophen (TYLENOL) Suppository 650 mg (has no administration in time range)   melatonin tablet 3 mg (has no administration in time range)   senna-docusate (SENOKOT-S/PERICOLACE) 8.6-50 MG per tablet 1 tablet (has no administration in time range)     Or   senna-docusate (SENOKOT-S/PERICOLACE) 8.6-50 MG per tablet 2 tablet (has no administration in time range)   polyethylene glycol (MIRALAX) Packet 17 g (has no administration in time range)   bisacodyl (DULCOLAX) Suppository 10 mg (has no administration in time range)   ondansetron (ZOFRAN-ODT) ODT tab 4 mg (has no administration in time range)     Or   ondansetron (ZOFRAN) injection 4 mg (has no administration in time range)   medication instruction (has no administration in time range)   aspirin (ASA) chewable tablet 324 mg (324 mg Oral Not Given 6/20/21 0315)   aspirin (ASA) chewable tablet 81 mg (has no administration in time range)   HOLD: nitroGLYcerin IF (has no administration in time range)   glucose gel 15-30 g (has no administration in time range)     Or   dextrose 50 % injection 25-50 mL (has no administration in time range)     Or   glucagon injection 1 mg (has no administration in time range)   Continuing beta blocker from home medication list OR beta blocker order already placed during this visit (has no administration in time range)   Continuing ACE inhibitor/ARB/ARNI from home medication list OR ACE inhibitor/ARB order already placed during this visit (has no administration in time range)   Continuing statin from home medication list OR statin order already placed during this visit (has no administration in time  range)   insulin aspart (NovoLOG) injection (RAPID ACTING) (6 Units Subcutaneous Given 6/20/21 0439)   heparin loading dose for LOW INTENSITY TREATMENT * Give BEFORE starting heparin infusion (4,600 Units Intravenous Not Given 6/20/21 0315)   0.9% sodium chloride BOLUS (0 mLs Intravenous Stopped 6/20/21 0020)   aspirin (ASA) tablet 325 mg (325 mg Oral Given 6/20/21 0049)   sodium chloride (PF) 0.9% PF flush 10 mL (10 mLs Intravenous Given 6/20/21 0224)   perflutren diluted 1mL to 2mL with saline (OPTISON) diluted injection 9 mL (9 mLs Intravenous Given 6/20/21 0225)     Disposition:  The patient was admitted to the hospital under the care of Dr. Rodas.       Impression & Plan   CMS Diagnoses: None   Medical Decision Making:  Patient is a 80-year-old male who presents after 2 unwitnessed falls at his home as well as mild confusion developing over the last 1 week.  Patient denies any active chest pain but has noted some mild shortness of breath in recent days.  His history is somewhat limited due to confusion but niece at bedside helps to provide some information of how his confusion has developed in the last several days.  Initial EKG was abnormal but reassuringly did not show any evidence of active STEMI.  His initial troponin returned elevated concerning for NSTEMI.  His troponin was 2.304.  Patient was given an aspirin and heparinized after CT imaging of the head was negative for acute intracranial hemorrhage.  That CT scan of the head was obtained due to recurrent falls in recent days.  Chest x-ray reassuringly shows no evidence of CHF or pneumonia.  There was a prior sternotomy and CABG procedure seen on chest x-ray imaging.  Blood work otherwise pertinent for CKD with a creatinine of 1.40.  Patient is also hyperglycemic at 487.  Reassuringly he is afebrile and has no infectious symptoms on arrival.  He is mildly tachycardic on arrival here.  Ultimately I am concerned for an acute cardiac event as the cause of  patient's syncopal events and recurrent falls in recent days.  Unclear to the exact etiology of the encephalopathy at this time but will admit the patient for further monitoring and evaluation.  I spoke with Dr. Rodas of the hospitalist service who agreed to inpatient admission.     Critical Care Time: was 35 minutes for this patient excluding procedures    Covid-19  Tad Manning was evaluated during a global COVID-19 pandemic, which necessitated consideration that the patient might be at risk for infection with the SARS-CoV-2 virus that causes COVID-19.   Applicable protocols for evaluation were followed during the patient's care.   COVID-19 was considered as part of the patient's evaluation. The plan for testing is:  a test was obtained during this visit.    Diagnosis:    ICD-10-CM    1. ACS (acute coronary syndrome) (H)  I24.9 Troponin I - Now then in 4 hours x 2     CBC with platelets     Osmolality     Glucose by meter     Glucose by meter   2. Confusion  R41.0    3. Recurrent falls  R29.6          Scribe Disclosure:  I, Sonny Lowery, am serving as a scribe at 11:04 PM on 6/19/2021 to document services personally performed by Dale Marquez MD based on my observations and the provider's statements to me.            Dale Marquez MD  06/20/21 1269

## 2021-06-20 NOTE — TREATMENT PLAN
RECEIVING UNIT ED HANDOFF REVIEW    ED Nurse Handoff Report was reviewed by: Denise Angel RN on June 20, 2021 at 2:32 AM

## 2021-06-20 NOTE — PLAN OF CARE
VSS, no pain or complaints. Sleepy after being in ER much of the night. Q 4 neuros, stable. Forgetful, disoriented to time. Heparin infusing for + troponin, high of 5.5, denies ever having chest pain. Cards consult pending. Pts osman Minor is his  and she was update by phone this morning. No signs of injury from fall except small bruise on lumbar spine. LLE stump red with a few scabs and rash over butt-WOC consult ordered. Blood sugars elevated, insulins re-ordered. Turn/repo with minimal assist. To re-start his chemo-family is arranging to get med from home as we do not carry it. Pharmacy chemo checks in process.

## 2021-06-20 NOTE — H&P
[facility]    History and Physical  Hospitalist       Date of Admission:  6/19/2021  Date of Service (when I saw the patient): 06/20/21    ASSESSMENT  Tad Manning is a markedly pleasant 80 year old gentleman with extensive past medical history that is most notable for CLL, CAD, chronic systolic CHF, PAD status post right lower extremity BKA, Type 2 Diabetes mellitus, and chronic atrial fibrillation on Eliquis, among multiple others; who presents with recurrent falls and generalized weakness and is found to have possible ACS.    PLAN    Possible ACS and acute metabolic encephalopathy: Of note, he has multi-vessel CAD, status post CABG (L-LAD, SVG-OM1, SVG-D1) in 1995, followed more recently by Left Heart catheterization (done pre-operatively) in 3/2018 which reportedly showed moderate disease in his RCA, severe LAD disease and patent grafts: he had two drug eluting stents placed in the proximal and mid LAD. Right sided catheterization done concomitantly showed elevated right and left sided pressures with type II pulmonary hypertension. he also has known ischemic cardiomyopathy causing chronic systolic CHF (Stage C, NYHA 3), most recently having LVEF 35-40% on TTE in 2018. He is followed by Dr. Valdes of [facility]. He presents for falls at home, with reported increased exertional dyspnea and confusion. Troponin is positive in the ED. He could have ACS, though alternatively he could have demand ischemia due to other cause of acute illness such as occult infection. Acute systolic CHF exacerbation is possible, though he appeared hypovolemic in the ED initially and IV fluid was given. Last, we note that he just resumed chemotherapy as below for CLL and this could be playing a role in his weakness. We note that UA and XR are negative for signs of infection there. There are no other evident acute neurologic deficits at this time to suggest stroke. He could have underlying as yet  undiagnosed dementia   -- Inpatient. NPO. Telemetry, serial enzymes, Cardiology consulted. Follow up TTE done in ED. ASA and Heparin infusion continued. Check BNP    -- Resume ASA, Digoxin when verified; level ordered. On Lasix 20 mg PO daily as an outpatient, resume as appropriate    -- If based on the results of above tests, cardiac cause of weakness and falls is thought less likely, would consider Ct of chest, abdomen and pelvis and possibly Brain MRI for further evaluation.    -- Fall precautions; q 4 neuro checks    CLL: Followed by [facility], treated with steroids for hemolytic anemia in 2012 and received chemotherapy at that time. More recently in the past year he has been found to have recurrence. In 4/2021 it seems he saw his [email or web address] Oncology team in follow up and since then has started Venetoclax.    -- Oncology consulted for further evaluation here    PAD: He is status post a left above-knee popliteal to below-knee popliteal bypass in 2014.  He then underwent a R leg below knee amputation in Oct 2018. He last saw Dr. Vick of Vascular Surgery in 12/2020 (at a virtual visit). Noted.    Vertebral osteomyelitis: In 2017: causing paraparesis and osteomyelitis with epidural abscess and diskitis due to MRSA, status post surgical decompression with T7 through T12 instrumentation, T9-10 interbody fusion and T7 through T12 posterior arthrodesis and antibiotic treatment. Noted.    -- If above measures can not ascertain the cause of current weakness, consider spine imaging for further evaluation    Paroxysmal atrial fibrillation: CHADS-VASC noted to be at least 6. He is meant to be on Eliquis but it seems he has been mis-adherent to medications at home.    -- Anticoagulation management as per Cardiology     Hypertension: Resume Toprol, Lisinopril when verified  Dyslipidemia: Resume Lipitor when verified    Type 2 Diabetes mellitus: On Lantus, Metformin as an outpatient. His last admission here was 1/2020 for hyperglycemia  and STORM and started on insulin at that time. Currently FS near 500. He does not appear to have been consistently taking his medications at home.    -- Check serum osmolality and if it is significantly elevated, consider insulin infusion for management of possible HHS.     -- Otherwise, ISS insulin while hospitalized. Hold oral anti-diabetic medications. Resume home insulin when verified.    -- Check A1c    Hyponatremia: Corrects to 136. Noted.    Chronic anemia, thrombocytopenia: Noted. Monitor closely with daily CBC/differential while hospitalized    Stage 3 CKD: Cr 1.4 which seems to be very near his recent previous values.     -- Daily BMP; avoid NSAID's while hospitalized    Chronic Stage 2 sacral decubitus ulcers: Present on admission. WOC consulted.    BPH: By history. Noted.  Noted to take Trazodone and Celexa and a PPI as an outpatient.    Rule Out COVID-19 infection  This patient was evaluated during a global COVID-19 pandemic, which necessitated consideration that the patient might be at risk for infection with the SARS-CoV-2 virus that causes COVID-19. Applicable protocols for evaluation were followed during the patient's care. Low suspicion for infection.   -negative COVID-19 PCR test result  -no current indication for precautions    Chief Complaint   Falls    History is obtained from the patient, his niece at the bedside, and the ED physician whom I have spoken with    History of Present Illness   Tad Manning is a markedly pleasant 80 year old gentleman who presents with falls. It seems at home where he has been alone he fell yesterday. His family decided after hearing about that to try to visit him today and found him not able to answer the phone and eventually he was found it seems having fallen a second time today. His niece adds that he has seemed to have significant increased confusion and disorientation over the past two weeks. He himself is currently awake and calm and says he is able to  "remember falling but can not recall the specific details. He thinks maybe his leg prosthesis might have given out. He adds that he has noticed 3-4 months of progressive exertional dyspnea, and adds that at times he has had intermittent loose non-bloody stool. He has recently started chemotherapy for CLL and his last dose it seems was earlier this week. He does not appear to have been consistently taking his medications at home, by his own admission to me. He otherwise denies any current or recent chest pain, or fever, chills, sweats, or cough, or dysuria, abdominal pain, or recent back pain (his niece adds that after his extensive hospitalization in 2017 for vertebral osteomyelitis he has not really gotten back to full strength; and that his mental status might have slowly been on the decline for the past several months prior to acutely worsening about two weeks ago); or speech or vision changes, or any other acute complaints.    In the ED, Blood pressure (!) 160/84, pulse 107, temperature 97.6  F (36.4  C), temperature source Temporal, resp. rate 15, weight 76.7 kg (169 lb), SpO2 98 %.    CBC and CMP were notable for HGB 11.2, , Na 128, Glucose 487, BUN 33, Cr 1.40, otherwise were within the normal reference range. Troponin was 2.304. EKG showed regular narrow complex tachycardia with abnormal P wave morphology. UA showed 2 RBC, no pyuria. COVID was negative.    CT Head: 'IMPRESSION:  1.  No acute intracranial hemorrhage or calvarial fracture.  2.  Mild to moderate volume loss and presumed chronic small vessel ischemic changes\"    CXR: \"IMPRESSION: No evidence for CHF or pneumonia. Upper normal heart size. Prior sternotomy and CABG procedure. No pleural effusion or pneumothorax. Extensive mid and lower thoracic spinal fusion.\"    TTE done in the ED and results are pending.  He was given ASA and IV fluid and started on Heparin in the ED.    PHYSICAL EXAM  Blood pressure (!) 160/84, pulse 107, temperature 97.6 "  F (36.4  C), temperature source Temporal, resp. rate 15, weight 76.7 kg (169 lb), SpO2 98 %.  Constitutional: Alert and oriented to person, place but not time; no apparent distress; appears very ti and frail  HEENT: normocephalic moist mucus membranes  Respiratory: lungs clear to auscultation bilaterally  Cardiovascular: regular S1 S2 tachycardic  GI: abdomen soft non tender non distended bowel sounds positive  Lymph/Hematologic: pale, no cervical lymphadenopathy  Skin: Stage 2 sacral decubitus ulcer without tenderness or drainage, good turgor  Musculoskeletal: no clubbing, cyanosis or edema; RLE stump site c/d/i  Neurologic: extra-ocular muscles intact; moves all four extremities  Psychiatric: appropriate affect, with some word finding difficulties     DVT Prophylaxis:  Heparin IV  Code Status: Full Code, confirmed with him    Disposition: Expected discharge in 2-4 days    Russell Rodas MD    Past Medical History    I have reviewed this patient's medical history and updated it with pertinent information if needed.   Past Medical History:   Diagnosis Date     Arthritis      ASCVD (arteriosclerotic cardiovascular disease)      BMI 30.0-30.9,adult      BPH (benign prostatic hypertrophy)      Cellulitis      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)      Coronary artery disease     triple bypass 1995     Diabetes mellitus (H)      Diabetic polyneuropathy (H)      History of blood transfusion      Hyperlipidaemia      Hypertension      Non-healing ulcer of foot (H)     Right     Noninflammatory pericardial effusion      Osteomyelitis of left foot (H)      PVD (peripheral vascular disease) (H)      Sebaceous cyst        Past Surgical History   I have reviewed this patient's surgical history and updated it with pertinent information if needed.  Past Surgical History:   Procedure Laterality Date     AMPUTATE LEG BELOW KNEE Right 10/8/2018    Procedure: AMPUTATE LEG BELOW KNEE;   OPEN RIGHT LOWER LEG AMPUTATION WITH WOUND VAC PLACEMENT    EBL: 50mL;  Surgeon: Amador Vick MD;  Location:  OR     AMPUTATE REVISION STUMP LOWER EXTREMITY Right 10/11/2018    Procedure: AMPUTATE REVISION STUMP LOWER EXTREMITY;  CLOSURE RIGHT BELOW KNEE AMPUTATION;  Surgeon: Amador Vick MD;  Location:  OR     AMPUTATE TOE(S)  1/10/2014    Procedure: AMPUTATE TOE(S);;  Surgeon: Amador Vick MD;  Location:  OR     APPLY WOUND VAC Right 10/8/2018    Procedure: APPLY WOUND VAC;;  Surgeon: Amador Vick MD;  Location:  OR     BONE MARROW BIOPSY, BONE SPECIMEN, NEEDLE/TROCAR  10/19/2012    Procedure: BIOPSY BONE MARROW;  BONE MARROW BIOPSY  (CONSCIOUS SED);  Surgeon: Ramon Quesada MD;  Location:  GI     BYPASS GRAFT FEMOROPOPLITEAL  1/10/2014    Procedure: BYPASS GRAFT FEMOROPOPLITEAL;  Left above knee popliteal to  Below knee popliteal bypass using transverse saphenous vein graft. Left 2nd Toe amputation;  Surgeon: Amador Vick MD;  Location:  OR     CARDIAC SURGERY      angioplasty with stent, triple bypass     EXCISE CYST GENERIC (LOCATION) N/A 2/1/2016    Procedure: EXCISE CYST GENERIC (LOCATION);  Surgeon: Amador Vick MD;  Location:  SD     GRAFT VEIN FROM EXTREMITY (LOCATION)  1/10/2014    Procedure: GRAFT VEIN FROM EXTREMITY (LOCATION);;  Surgeon: Amador Vick MD;  Location:  OR     LAMINECTOMY THORACIC ONE LEVEL N/A 2/8/2017    Procedure: LAMINECTOMY THORACIC ONE LEVEL;  Surgeon: Marcelo Trent MD;  Location: [facility] OR     OPTICAL TRACKING SYSTEM FUSION POSTERIOR SPINE THORACIC THREE+ LEVELS N/A 2/12/2017    Procedure: OPTICAL TRACKING SYSTEM FUSION POSTERIOR SPINE THORACIC THREE+ LEVELS;  Surgeon: Marcelo Trent MD;  Location:  OR     TONSILLECTOMY       VASCULAR SURGERY      ptcr legs       Prior to Admission Medications   Prior to Admission Medications   Prescriptions Last Dose Informant Patient Reported? Taking?   Ferrous  Sulfate 324 (65 Fe) MG TBEC   Yes No   Sig: TK 1 T PO D   Magnesium Oxide 250 MG TABS   Yes No   Sig: Take 375 mg by mouth   PROVIDER DOSED MANAGED WARFARIN, COUMADIN, OUTPATIENT  Self No No   Sig: Dose based on INR per Primary MD/Nursing Home MD.   ULTICARE SHORT 31G X 8 MM insulin pen needle   Yes No   alcohol swab prep pads   No No   Sig: Use to swab area of injection/jina as directed.   apixaban ANTICOAGULANT (ELIQUIS ANTICOAGULANT) 5 MG tablet   No No   Sig: Take 1 tablet (5 mg) by mouth 2 times daily   aspirin (ASA) 81 MG tablet  Self No No   Sig: Take 1 tablet (81 mg) by mouth daily   atorvastatin (LIPITOR) 20 MG tablet  Self No No   Sig: Take 1 tablet (20 mg) by mouth daily   blood glucose (NO BRAND SPECIFIED) test strip   No No   Sig: Use to test blood sugar 4 times daily or as directed.   blood glucose calibration (NO BRAND SPECIFIED) solution   No No   Si drop every 14 days Use 1 drop to calibrate blood glucose monitor every 14 days   blood glucose monitoring (NO BRAND SPECIFIED) meter device kit   No No   Sig: Use to test blood sugar 4 times daily or as directed.   cholecalciferol 1000 UNITS TABS  Self No No   Sig: Take 1,000 Units by mouth daily   citalopram (CELEXA) 20 MG tablet  Self Yes No   Sig: Take 20 mg by mouth every evening   digoxin (LANOXIN) 125 MCG tablet  Self Yes No   Sig: Take 1 tablet (125 mcg) by mouth daily   furosemide (LASIX) 20 MG tablet  Self No No   Sig: Take 1 tablet (20 mg) by mouth daily   hydrocortisone (CORTIZONE-10) 1 % ointment  Self Yes No   Sig: Apply topically 2 times daily as needed for itching   insulin glargine (LANTUS SOLOSTAR) 100 UNIT/ML pen   No No   Sig: Inject 25 Units Subcutaneous At Bedtime Lantus Solostar Pen   lisinopril (ZESTRIL) 5 MG tablet   Yes No   lisinopril (ZESTRIL) 5 MG tablet   No No   Sig: Take 1 tablet (5 mg) by mouth daily   melatonin 5 MG tablet  Self Yes No   Sig: Take 5 mg by mouth At Bedtime   metFORMIN (GLUCOPHAGE-XR) 750 MG 24 hr  tablet  Self Yes No   Sig: Take 750 mg by mouth 2 times daily (with meals)   metoprolol succinate ER (TOPROL-XL) 100 MG 24 hr tablet  Self No No   Sig: Take 1 tablet (100 mg) by mouth daily   nitroglycerin (NITROSTAT) 0.4 MG sublingual tablet  Self No No   Sig: For chest pain place 1 tablet under the tongue every 5 minutes for 3 doses. If symptoms persist 5 minutes after 1st dose call 911.   nystatin (MYCOSTATIN) 797343 UNIT/GM external cream  Self Yes No   Sig: Apply topically daily as needed for dry skin    order for DME  Self No No   Sig: Equipment being ordered: DH2 shoe   order for DME   No No   Sig: Equipment to be ordered:  Scale- Qty 1  Commander Flex - Qty. 1  Pulse-Oximeter - Qty. 1  Use as directed   pantoprazole (PROTONIX) 40 MG EC tablet  Self Yes No   Sig: Take 40 mg by mouth daily   polyethylene glycol (MIRALAX/GLYCOLAX) Packet  Self Yes No   Sig: Take 1 packet by mouth daily as needed    sodium chloride (EQ SALINE NASAL SPRAY) 0.65 % nasal spray  Self No No   Sig: Spray 1 spray into both nostrils daily as needed for congestion   thin (NO BRAND SPECIFIED) lancets   No No   Si each 4 times daily Use with lanceting device.   traZODone (DESYREL) 50 MG tablet  Self Yes No   Sig: Take 150 mg by mouth At Bedtime      Facility-Administered Medications: None     Allergies   Allergies   Allergen Reactions     Blood Transfusion Related (Informational Only) Other (See Comments)     Patient has a history of a clinically significant antibody against RBC antigens.  A delay in compatible RBCs may occur.        Social History   I have reviewed this patient's social history and updated it with pertinent information if needed. Tad Manning  reports that he quit smoking about 26 years ago. His smoking use included cigarettes. He has a 60.00 pack-year smoking history. He has never used smokeless tobacco. He reports that he does not drink alcohol or use drugs.    Family History   Family history assessed and,  except as above, is non-contributory.    Family History   Problem Relation Age of Onset     Cancer Mother         leukemia       Review of Systems   The 10 point Review of Systems is negative other than noted in the HPI or here.     Primary Care Physician   Gene Guevara    Data   Labs Ordered and Resulted from Time of ED Arrival Up to the Time of Departure from the ED   COMPREHENSIVE METABOLIC PANEL - Abnormal; Notable for the following components:       Result Value    Sodium 128 (*)     Glucose 487 (*)     Urea Nitrogen 33 (*)     Creatinine 1.40 (*)     GFR Estimate 48 (*)     GFR Estimate If Black 55 (*)     All other components within normal limits   CBC WITH PLATELETS DIFFERENTIAL - Abnormal; Notable for the following components:    RBC Count 3.60 (*)     Hemoglobin 11.2 (*)     Hematocrit 32.7 (*)     Platelet Count 145 (*)     Absolute Lymphocytes 0.5 (*)     All other components within normal limits   GLUCOSE BY METER - Abnormal; Notable for the following components:    Glucose 473 (*)     All other components within normal limits   ROUTINE UA WITH MICROSCOPIC - Abnormal; Notable for the following components:    Glucose Urine >1000 (*)     Ketones Urine 10 (*)     Blood Urine Moderate (*)     Protein Albumin Urine 200 (*)     Mucous Urine Present (*)     All other components within normal limits   TROPONIN I - Abnormal; Notable for the following components:    Troponin I ES 2.304 (*)     All other components within normal limits   GLUCOSE MONITOR NURSING POCT   SARS-COV-2 (COVID-19) VIRUS RT-PCR   CARDIAC CONTINUOUS MONITORING   MEASURE WEIGHT   NOTIFY PHYSICIAN   NOTIFY PHYSICIAN       Data reviewed today:  I personally reviewed the EKG tracing showing regular narrow complex tachycardia with abnormal p wave morphology.    Recent Results (from the past 24 hour(s))   CT Head w/o Contrast    Narrative    EXAM: CT HEAD W/O CONTRAST  LOCATION: [facility]  DATE/TIME: 6/20/2021 12:12 AM    INDICATION:  Head trauma, minor (Age >= 65y)  COMPARISON: 09/22/2018  TECHNIQUE: Routine CT Head without IV contrast. Multiplanar reformats. Dose reduction techniques were used.    FINDINGS:  INTRACRANIAL CONTENTS: No intracranial hemorrhage, extraaxial collection, or mass effect.  No CT evidence of acute infarct. Mild to moderate presumed chronic small vessel ischemic changes. Mild to moderate generalized volume loss. No hydrocephalus.     VISUALIZED ORBITS/SINUSES/MASTOIDS: No intraorbital abnormality. No paranasal sinus mucosal disease. No middle ear or mastoid effusion.    BONES/SOFT TISSUES: No acute abnormality.      Impression    IMPRESSION:  1.  No acute intracranial hemorrhage or calvarial fracture.  2.  Mild to moderate volume loss and presumed chronic small vessel ischemic changes.   XR Chest 2 Views    Narrative    EXAM: XR CHEST 2 VW  LOCATION: [facility]  DATE/TIME: 6/20/2021 12:27 AM    INDICATION: ; confusion, ACS;  COMPARISON: 10/04/2018      Impression    IMPRESSION: No evidence for CHF or pneumonia. Upper normal heart size. Prior sternotomy and CABG procedure. No pleural effusion or pneumothorax. Extensive mid and lower thoracic spinal fusion.

## 2021-06-20 NOTE — PLAN OF CARE
OT/CR: Orders rec'd and chart reviewed. Pt admitted due to recurrent falls and generalized weakness and is found to have possible ACS, elevated troponin and cards consulted, will hold OT/CR eval until cards consult and POC established.

## 2021-06-20 NOTE — PROGRESS NOTES
[facility]    Hospitalist Progress Note      Assessment & Plan   Tad Manning is a 80 year old male with extensive past medical history that is most notable for CLL, CAD, chronic systolic CHF, PAD status post right lower extremity BKA, Type 2 Diabetes mellitus, and chronic atrial fibrillation on Eliquis, among multiple others; who presents with recurrent falls and generalized weakness and is found to have possible ACS.    Possible ACS and acute metabolic encephalopathy  Ischemic cardiomyopathy--worsening, EF 20-25%  Of note, he has multi-vessel CAD, status post CABG (L-LAD, SVG-OM1, SVG-D1) in 1995, followed more recently by Left Heart catheterization (done pre-operatively) in 3/2018 which reportedly showed moderate disease in his RCA, severe LAD disease and patent grafts: he had two drug eluting stents placed in the proximal and mid LAD. Right sided catheterization done concomitantly showed elevated right and left sided pressures with type II pulmonary hypertension. he also has known ischemic cardiomyopathy causing chronic systolic CHF (Stage C, NYHA 3), most recently having LVEF 35-40% on TTE in 2018. He is followed by Dr. Valdes of [facility]. He presents for falls at home, with reported increased exertional dyspnea and confusion. Troponin is positive in the ED. He could have ACS, though alternatively he could have demand ischemia due to other cause of acute illness such as occult infection. Acute systolic CHF exacerbation is possible, though he appeared hypovolemic in the ED initially and IV fluid was given. Last, we note that he just resumed chemotherapy as below for CLL and this could be playing a role in his weakness. We note that UA and XR are negative for signs of infection there. There are no other evident acute neurologic deficits at this time to suggest stroke. He could have underlying as yet undiagnosed dementia  Trop 2.304-->5.523-->5.095-->3.629.  BNP 7364  *  6/20 echocardiogram: EF 20-25%, RV normal, LA moderately dilated. Aortic valve sclerosis. Mild mitral regurgitation   -- cardiology consulted  -- Heparin gtt for now  -- Resumed ASA  -- Digoxin level low at 0.4--there is question of compliance outpatient, resumed 6/20   -- continue PTA toprol XL 100mg, lisinopril 5mg daily  -- On Lasix 20 mg PO daily as an outpatient, appears euvolemic, may consider resumption on 6/21 if appropriate  -- Fall precautions   -- q8 neuro checks     CLL  Followed by [facility], treated with steroids for hemolytic anemia in 2012 and received chemotherapy at that time. More recently in the past year he has been found to have recurrence. In 4/2021 it seems he saw his [facility] team in follow up and since then has started Venetoclax.  -- oncology appreciated  -- continue PTA venetoclax at 100mg and allopurinol 300mg daily     PAD  He is status post a left above-knee popliteal to below-knee popliteal bypass in 2014.  He then underwent a R leg below knee amputation in Oct 2018. He last saw Dr. Vick of Vascular Surgery in 12/2020 (at a virtual visit).   - Noted.     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 Noted.  -- await PT/OT evaluations     Paroxysmal atrial fibrillation  CHADS-VASC noted to be at least 6. He is meant to be on Eliquis but it seems he has been mis-adherent to medications at home.  --heparin gtt for now, plan for eliquis once OK per cardio     Hypertension  - resumed PTA lisinopril 5mg daily, toprol XL 100mg daily    Dyslipidemia  Resume Lipitor when verified     Type 2 Diabetes mellitus  On Lantus, Metformin as an outpatient. His last admission here was 1/2020 for hyperglycemia and STORM and started on insulin at that time. Currently FS  near 500. He does not appear to have been consistently taking his medications at home. A1C is 9  -- medium resistance sliding scale insulin  -- 3 units TIDWM novolog  -- 15 units Lantus to start evening 6/20 (normally on 25 units)     Pseudohyponatremia  Corrects to 136. Low sodium related to high BGs on admit     Chronic anemia, thrombocytopenia  Noted, seems near baseline     Stage 3 CKD  Cr 1.4 which seems to be very near his recent previous values.   - Daily BMP;     Chronic Stage 2 sacral decubitus ulcers  Present on admission.   - WOC consulted      BPH  By history. Noted.     Depression  PTA Celexa continued   - PTA trazodone resumed at reduced dose, 50mg (instead of 150mg), given falls/confusion    GERD  PTA PPI, not used recently  - resumed     COVID-19 screening-negative    DVT Prophylaxis: heparin gtt  Code Status: Full Code  Expected discharge: 3-4 days, recommended to likely TCU, given he lives with a friend who is currently out of town for a week. Seems like he needs more help at home anyway with his medical compliance. Await therapy evals.    Liset Lance, DO  Text Page (7am - 6pm)    Interval History   Patient seen and examined. A little sleepy this morning, but wakes up easily for conversation. He has some poor recall for medications and what he is taking. States he rarely forgets his lantus, but the rest of his medications he sometimes has more trouble with. Has a female friend he lives with who helps him some, but he still misses things sometimes. She left town on Friday to go to [place] and will return this coming Thursday. No chest pain, shortness of breath, fevers, chills. Feeling a little more tired recently, but he denies any confusion. States he might be more forgetful.    -Data reviewed today: I reviewed all new labs and imaging results over the last 24 hours. I personally reviewed no images or EKG's today.    Physical Exam   Temp: 98.3  F (36.8  C) Temp src: Oral BP: (!) 147/80  Pulse: 94   Resp: 13 SpO2: 96 % O2 Device: None (Room air)    Vitals:    06/19/21 2238 06/20/21 0300   Weight: 76.7 kg (169 lb) 69 kg (152 lb 1.6 oz)     Vital Signs with Ranges  Temp:  [97.6  F (36.4  C)-98.3  F (36.8  C)] 98.3  F (36.8  C)  Pulse:  [] 94  Resp:  [13-20] 13  BP: (121-171)/() 147/80  SpO2:  [93 %-99 %] 96 %  I/O last 3 completed shifts:  In: 783.8 [P.O.:740; I.V.:43.8]  Out: 675 [Urine:675]    Constitutional: Awake, alert, cooperative, no apparent distress  Respiratory: Clear to auscultation bilaterally, no crackles or wheezing  Cardiovascular: Regular rhythm, tachycardic near 100, normal S1 and S2, and no murmur noted  GI: Normal bowel sounds, soft, non-distended, non-tender  Skin/Integumen: No rashes, no cyanosis, no edema. Stage 2 sacral decubitus ulcer noted.  Other: Oriented to person, place, situation. Memory poor for details    Medications     Continuing ACE inhibitor/ARB/ARNI from home medication list OR ACE inhibitor/ARB order already placed during this visit       - MEDICATION INSTRUCTIONS -       - MEDICATION INSTRUCTIONS -       heparin 1,050 Units/hr (06/20/21 0940)     - MEDICATION INSTRUCTIONS -         allopurinol  300 mg Oral Daily     [START ON 6/21/2021] aspirin  81 mg Oral Daily     atorvastatin  20 mg Oral Daily     citalopram  20 mg Oral QPM     digoxin  125 mcg Oral Daily     heparin ANTICOAGULANT Loading dose  60 Units/kg Intravenous Once     insulin aspart  3 Units Subcutaneous TID w/meals     insulin aspart  1-7 Units Subcutaneous TID AC     insulin aspart  1-5 Units Subcutaneous At Bedtime     insulin glargine  15 Units Subcutaneous At Bedtime     lisinopril  5 mg Oral Daily     metoprolol succinate ER  100 mg Oral Daily     pantoprazole  40 mg Oral Daily     sodium chloride (PF)  3 mL Intracatheter Q8H     venetoclax  100 mg Oral Daily with lunch       Data   Recent Labs   Lab 06/20/21  1209 06/20/21  0707 06/20/21  0336 06/19/21  2237   WBC  --  3.6* 3.7* 4.8    HGB  --  10.0* 9.9* 11.2*   MCV  --  92 91 91   PLT  --  116* 131* 145*   NA  --  134  --  128*   POTASSIUM  --  3.9  --  4.4   CHLORIDE  --  102  --  95   CO2  --  24  --  24   BUN  --  31*  --  33*   CR  --  1.36*  --  1.40*   ANIONGAP  --  8  --  9   JUSTINO  --  9.5  --  10.0   GLC  --  283*  --  487*   ALBUMIN  --  3.3*  --  4.0   PROTTOTAL  --  6.7*  --  7.7   BILITOTAL  --  0.9  --  1.1   ALKPHOS  --  72  --  84   ALT  --  24  --  26   AST  --  34  --  32   TROPI 3.629* 5.095* 5.523* 2.304*       Recent Results (from the past 24 hour(s))   CT Head w/o Contrast    Narrative    EXAM: CT HEAD W/O CONTRAST  LOCATION: [facility]  DATE/TIME: 6/20/2021 12:12 AM    INDICATION: Head trauma, minor (Age >= 65y)  COMPARISON: 09/22/2018  TECHNIQUE: Routine CT Head without IV contrast. Multiplanar reformats. Dose reduction techniques were used.    FINDINGS:  INTRACRANIAL CONTENTS: No intracranial hemorrhage, extraaxial collection, or mass effect.  No CT evidence of acute infarct. Mild to moderate presumed chronic small vessel ischemic changes. Mild to moderate generalized volume loss. No hydrocephalus.     VISUALIZED ORBITS/SINUSES/MASTOIDS: No intraorbital abnormality. No paranasal sinus mucosal disease. No middle ear or mastoid effusion.    BONES/SOFT TISSUES: No acute abnormality.      Impression    IMPRESSION:  1.  No acute intracranial hemorrhage or calvarial fracture.  2.  Mild to moderate volume loss and presumed chronic small vessel ischemic changes.   XR Chest 2 Views    Narrative    EXAM: XR CHEST 2 VW  LOCATION: [facility]  DATE/TIME: 6/20/2021 12:27 AM    INDICATION: ; confusion, ACS;  COMPARISON: 10/04/2018      Impression    IMPRESSION: No evidence for CHF or pneumonia. Upper normal heart size. Prior sternotomy and CABG procedure. No pleural effusion or pneumothorax. Extensive mid and lower thoracic spinal fusion.   Echocardiogram Complete    Narrative     [id number]  [id number]  [id number]  [id number]^ZINA^HAKEEM     [facility]  Echocardiography Laboratory  [address] [place] [address]     Name: PETER BUTLER  MRN: [id number]  : 1940  Study Date: 2021 01:09 AM  Age: 80 yrs  Gender: Male  Patient Location: [facility]  Reason For Study: Acute Myocardial Infarction  Ordering Physician: HAKEEM IZAGUIRRE  Referring Physician: Gene Guevara  Performed By: Hakeem Toledo RDCS     BSA: 1.9 m2  Height: 70 in  Weight: 168 lb  HR: 100  BP: 160/84 mmHg  ______________________________________________________________________________  Procedure  Complete Portable Echo Adult. Optison (NDC #[id number]) given intravenously.  ______________________________________________________________________________  Interpretation Summary     1. Left ventricular systolic function is severely reduced. The visual ejection  fraction is estimated at 20-25%.  2. The right ventricle is normal in structure, function and size.  3. The left atrium is moderately dilated.  4. The aortic valve is trileaflet with aortic valve sclerosis.  5. There is mild (1+) mitral regurgitation.  ______________________________________________________________________________  Left Ventricle  The left ventricle is moderately dilated. The visual ejection fraction is  estimated at 20-25%. Left ventricular systolic function is severely reduced.  Diastolic Doppler findings (E/E' ratio and/or other parameters) suggest left  ventricular filling pressures are indeterminate. There is severe global  hypokinesia of the left ventricle.     Right Ventricle  The right ventricle is normal in structure, function and size.     Atria  The left atrium is moderately dilated. Right atrial size is normal.     Mitral Valve  The mitral valve is normal in structure and function. There is mild (1+)  mitral regurgitation.     Tricuspid Valve  The tricuspid valve is normal in structure and function. There is  trace  tricuspid regurgitation.     Aortic Valve  The aortic valve is trileaflet with aortic valve sclerosis. There is trace to  mild aortic regurgitation.     Pulmonic Valve  The pulmonic valve is normal in structure and function. There is mild (1+)  pulmonic valvular regurgitation.     Vessels  The aortic root is normal size. IVC diameter <2.1 cm collapsing >50% with  sniff suggests a normal RA pressure of 3 mmHg.     Pericardium  There is no pericardial effusion.     ______________________________________________________________________________  MMode/2D Measurements & Calculations  IVSd: 0.89 cm  LVIDd: 6.6 cm  LVIDs: 6.4 cm  LVPWd: 0.97 cm  FS: 2.5 %  LV mass(C)d: 262.2 grams  LV mass(C)dI: 135.4 grams/m2     Ao root diam: 3.3 cm  LA dimension: 4.0 cm  asc Aorta Diam: 3.1 cm  LA/Ao: 1.2  LA Volume (BP): 93.6 ml  LA Volume Index (BP): 48.2 ml/m2  RWT: 0.30     Doppler Measurements & Calculations  MV E max alessia: 116.0 cm/sec  MV A max alessia: 0.00 cm/sec  MV dec slope: 1029 cm/sec2     PA acc time: 0.10 sec  PI end-d alessia: 184.6 cm/sec  TR max alessia: 224.1 cm/sec  TR max P.1 mmHg  E/E' av.5  Lateral E/e': 11.0  Medial E/e': 14.0     ______________________________________________________________________________  Report approved by: Aggie Herrera 2021 03:04 AM

## 2021-06-20 NOTE — CONSULTS
Consult Date: 06/20/2021    REASON FOR CONSULTATION:  I was asked by Dr. Russell Rodas to see Mr. Tad Manning for a Medical Oncology consultation regarding history of chronic lymphocytic leukemia and for recommendations regarding management.    HISTORY OF PRESENT ILLNESS:  Mr. Tad Manning is a 80-year-old man who is followed in our office for a history of chronic lymphocytic leukemia, for which he has had extensive prior treatment.  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  He began this treatment on 03/31/2021 and has had significant improvement in his hemoglobin, as well as resolution of adenopathy, and also improvement in his peripheral counts since starting the treatment.  He was most recently seen in the office on 05/26/2021.  He also has a history of peripheral vascular disease with previous right below-the-knee amputation and also diabetes mellitus.  He is currently hospitalized after suffering a fall at home.  Per available records, his family had not been in contact with him for a period of time and subsequently found him to be confused and disoriented.  He was seen in the Emergency Department on 06/19/2021.  Workup at that time raised the possibility of acute coronary syndrome, and he is currently hospitalized in the Cardiac Intensive Care Unit.  Since admission to the hospital, he has had a number of studies performed.  A CBC at the time of admission showed a white count of 4800, hemoglobin 11.2 and platelet count of 145,000 with 70% neutrophils and 10% lymphocytes.  Troponin I level at admission was 2.3.  Repeat value earlier today was 5.0.  Glucose levels since admission have ranged between 283-473.  Chemistry panel on admission showed a sodium of 128 with normal electrolytes, otherwise with a creatinine of 1.4.  Liver chemistry studies are normal.  Hemoglobin A1c 9.0%, N-terminal  proBNP level of 7364.  Urinalysis showed moderate hematuria and proteinuria.  Chest x-ray from admission showed no evidence of congestive heart failure or pneumonia.  A CT of the head demonstrated no evidence for acute intracranial hemorrhage or calvarial fracture.  Echocardiogram obtained since admission showed severe reduction in left ventricular systolic function with an ejection fraction of 20-25%.  Mild mitral regurgitation was noted.  Left atrium was moderately dilated.  A Hematology consultation is requested for additional recommendations.    PAST MEDICAL HISTORY:    1. Chronic lymphocytic leukemia diagnosed in 11/2007, for which he was initially followed without evidence of disease recurrence.  In 05/2012, he developed severe autoimmune hemolytic anemia and was subsequently treated with corticosteroids.  With this treatment, he had marked exacerbation of diabetes.  In 05/2012, he also began fludarabine, cyclophosphamide and rituximab with completion of 6 cycles in 09/2012 and with attainment of prolonged complete remission.  He was then well until 03/2021, at which time he had disease recurrence, again complicated by marked anemia.  Laboratories at that time did not show clear evidence of hemolysis.  A number of treatment options were discussed with him, and he subsequently began to have another venetoclax and rituximab in part due to plans to avoid corticosteroid exposure and also to achieve rapid response.  With this treatment, he has had rapid improvement in his peripheral blood counts.   2.  Diabetes mellitus with poor long-term disease control.  3.  Hypertension.  4.  Hyperlipidemia.  5.  Coronary artery disease.  6.  Peripheral vascular disease.  7.  Peripheral neuropathy.  8.  Vertebral body osteomyelitis requiring surgical decompression and prolonged antibiotic therapy in 02/2017.    PAST SURGICAL HISTORY:   1.  Vertebral body surgery for osteomyelitis.  2.  Right leg below-the-knee amputation due to  peripheral vascular disease.  3.  Multiple prior peripheral vascular surgeries.    ALLERGIES:  HE HAS NO KNOWN ALLERGIES.    HOME MEDICATIONS:  As listed in electronic health record.    FAMILY HISTORY:  Includes a history of leukemia in his mother and also prostate cancer in his brother.  He does not have any children.    SOCIAL HISTORY:  He is retired.  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  He quit tobacco use in 1995.  He is single.    REVIEW OF SYSTEMS:  Positive for the problems that are mentioned.  The remainder of a 14-point review of systems is otherwise negative.    PHYSICAL EXAMINATION:    VITAL SIGNS:  At the time of the consultation show temperature 98.3, heart rate 97, blood pressure 149/89, respiratory rate 16, oxygen saturation 96%.  GENERAL:  Shows skin warm and dry.  Scalp appears normal.  HEENT:  Shows ears and nose unremarkable.  Oropharynx is clear.  LYMPH NODE:  Shows no palpable adenopathy in the cervical or axillary regions.  CHEST:  Shows clear breath sounds.  CARDIOVASCULAR:  Shows regular heart rate and rhythm.  ABDOMEN:  Soft and nontender without palpable enlargement of the spleen or liver.  EXTREMITIES:  Right leg is status post below-the-knee amputation.  There is no significant left leg edema or upper extremity edema.  NEUROLOGIC:  Shows mood and affect, as well as a judgment and insight normal.  Gait not assessed.    LABORATORY AND IMAGING STUDIES:  As already outlined.    IMPRESSION:    1.  Recent fall, etiology unclear.  I explained that this is not a typical side effect associated with venetoclax.  It is possible he may have had an arrhythmia; and therefore, serial monitoring for this possibility is certainly appropriate.  2.  Chronic lymphocytic leukemia, in second partial remission.  We reviewed his recent treatment and explained that venetoclax is not typically associated with cardiomyopathy; therefore, underlying progression of underlying coronary artery  disease would be a possible consideration.  3.  Cardiomyopathy, etiology unclear.  4.  Problem diabetes mellitus with poor long-term disease control.    RECOMMENDATION:    1.  Agree with Cardiology evaluation.  2.  Continue venetoclax.  3.  Serial monitoring of blood counts.    My colleagues and I will be happy to follow him along with you during the hospitalization.    Oumar England MD        D: 2021   T: 2021   MT: [facility]    Name:     PETER BUTLER  MRN:      [id number]        Account:      [id number]   :      1940           Consult Date: 2021     Document: [id number]    cc:  Russell Rodas MD

## 2021-06-20 NOTE — ED NOTES
DATE:  6/19/2021   TIME OF RECEIPT FROM LAB:  2136  LAB TEST:  Troponin  LAB VALUE:  2.304  RESULTS GIVEN WITH READ-BACK TO (PROVIDER):  Dr. Marquez  TIME LAB VALUE REPORTED TO PROVIDER:   4317

## 2021-06-20 NOTE — CONSULTS
Patient seen and examined. Full note dictated. He has a history of CLL and recently started on rituximab and venetoclax. Neither of these agents have been associated with cardiomyopathy to my knowledge. Given his history of significant peripheral vascular disease, progression of coronary artery disease would be possible explanation for decrease in heart function. It would therefore be reasonable to continue this medication while he is hospitalized.

## 2021-06-21 LAB
ANION GAP SERPL CALCULATED.3IONS-SCNC: 6 MMOL/L (ref 3–14)
APTT PPP: 58 SEC (ref 22–37)
BUN SERPL-MCNC: 33 MG/DL (ref 7–30)
CALCIUM SERPL-MCNC: 9.4 MG/DL (ref 8.5–10.1)
CHLORIDE SERPL-SCNC: 104 MMOL/L (ref 94–109)
CHOLEST SERPL-MCNC: 116 MG/DL
CO2 SERPL-SCNC: 23 MMOL/L (ref 20–32)
CREAT SERPL-MCNC: 1.32 MG/DL (ref 0.66–1.25)
ERYTHROCYTE [DISTWIDTH] IN BLOOD BY AUTOMATED COUNT: 14.6 % (ref 10–15)
GFR SERPL CREATININE-BSD FRML MDRD: 51 ML/MIN/{1.73_M2}
GLUCOSE BLDC GLUCOMTR-MCNC: 239 MG/DL (ref 70–99)
GLUCOSE BLDC GLUCOMTR-MCNC: 255 MG/DL (ref 70–99)
GLUCOSE BLDC GLUCOMTR-MCNC: 283 MG/DL (ref 70–99)
GLUCOSE BLDC GLUCOMTR-MCNC: 292 MG/DL (ref 70–99)
GLUCOSE BLDC GLUCOMTR-MCNC: 312 MG/DL (ref 70–99)
GLUCOSE SERPL-MCNC: 268 MG/DL (ref 70–99)
HCT VFR BLD AUTO: 32.3 % (ref 40–53)
HDLC SERPL-MCNC: 38 MG/DL
HGB BLD-MCNC: 10.6 G/DL (ref 13.3–17.7)
LDLC SERPL CALC-MCNC: 49 MG/DL
MAGNESIUM SERPL-MCNC: 2.1 MG/DL (ref 1.6–2.3)
MCH RBC QN AUTO: 30.7 PG (ref 26.5–33)
MCHC RBC AUTO-ENTMCNC: 32.8 G/DL (ref 31.5–36.5)
MCV RBC AUTO: 94 FL (ref 78–100)
NONHDLC SERPL-MCNC: 78 MG/DL
PHOSPHATE SERPL-MCNC: 3.5 MG/DL (ref 2.5–4.5)
PLATELET # BLD AUTO: 136 10E9/L (ref 150–450)
POTASSIUM SERPL-SCNC: 4.3 MMOL/L (ref 3.4–5.3)
RBC # BLD AUTO: 3.45 10E12/L (ref 4.4–5.9)
SODIUM SERPL-SCNC: 133 MMOL/L (ref 133–144)
TRIGL SERPL-MCNC: 145 MG/DL
WBC # BLD AUTO: 3.9 10E9/L (ref 4–11)

## 2021-06-21 PROCEDURE — 83735 ASSAY OF MAGNESIUM: CPT | Performed by: HOSPITALIST

## 2021-06-21 PROCEDURE — 250N000013 HC RX MED GY IP 250 OP 250 PS 637: Performed by: HOSPITALIST

## 2021-06-21 PROCEDURE — 99233 SBSQ HOSP IP/OBS HIGH 50: CPT | Performed by: INTERNAL MEDICINE

## 2021-06-21 PROCEDURE — 99232 SBSQ HOSP IP/OBS MODERATE 35: CPT | Performed by: HOSPITALIST

## 2021-06-21 PROCEDURE — 84100 ASSAY OF PHOSPHORUS: CPT | Performed by: HOSPITALIST

## 2021-06-21 PROCEDURE — 250N000011 HC RX IP 250 OP 636: Performed by: EMERGENCY MEDICINE

## 2021-06-21 PROCEDURE — 36415 COLL VENOUS BLD VENIPUNCTURE: CPT | Performed by: INTERNAL MEDICINE

## 2021-06-21 PROCEDURE — 210N000002 HC R&B HEART CARE

## 2021-06-21 PROCEDURE — 999N001017 HC STATISTIC GLUCOSE BY METER IP

## 2021-06-21 PROCEDURE — G0463 HOSPITAL OUTPT CLINIC VISIT: HCPCS

## 2021-06-21 PROCEDURE — 80048 BASIC METABOLIC PNL TOTAL CA: CPT | Performed by: HOSPITALIST

## 2021-06-21 PROCEDURE — 85730 THROMBOPLASTIN TIME PARTIAL: CPT | Performed by: INTERNAL MEDICINE

## 2021-06-21 PROCEDURE — 36415 COLL VENOUS BLD VENIPUNCTURE: CPT | Performed by: HOSPITALIST

## 2021-06-21 PROCEDURE — 85027 COMPLETE CBC AUTOMATED: CPT | Performed by: HOSPITALIST

## 2021-06-21 PROCEDURE — 250N000012 HC RX MED GY IP 250 OP 636 PS 637: Performed by: HOSPITALIST

## 2021-06-21 PROCEDURE — 80061 LIPID PANEL: CPT | Performed by: HOSPITALIST

## 2021-06-21 RX ORDER — LISINOPRIL 10 MG/1
10 TABLET ORAL DAILY
Status: DISCONTINUED | OUTPATIENT
Start: 2021-06-22 | End: 2021-06-24

## 2021-06-21 RX ORDER — FUROSEMIDE 20 MG
20 TABLET ORAL DAILY
Status: DISCONTINUED | OUTPATIENT
Start: 2021-06-21 | End: 2021-06-28 | Stop reason: HOSPADM

## 2021-06-21 RX ORDER — LISINOPRIL 5 MG/1
5 TABLET ORAL ONCE
Status: COMPLETED | OUTPATIENT
Start: 2021-06-21 | End: 2021-06-21

## 2021-06-21 RX ORDER — LORAZEPAM 2 MG/ML
0.5 INJECTION INTRAMUSCULAR
Status: DISCONTINUED | OUTPATIENT
Start: 2021-06-22 | End: 2021-06-22 | Stop reason: HOSPADM

## 2021-06-21 RX ORDER — LIDOCAINE 40 MG/G
CREAM TOPICAL
Status: DISCONTINUED | OUTPATIENT
Start: 2021-06-21 | End: 2021-06-22 | Stop reason: HOSPADM

## 2021-06-21 RX ORDER — SODIUM CHLORIDE 9 MG/ML
INJECTION, SOLUTION INTRAVENOUS CONTINUOUS
Status: DISCONTINUED | OUTPATIENT
Start: 2021-06-22 | End: 2021-06-22 | Stop reason: HOSPADM

## 2021-06-21 RX ORDER — LORAZEPAM 0.5 MG/1
0.5 TABLET ORAL
Status: DISCONTINUED | OUTPATIENT
Start: 2021-06-22 | End: 2021-06-22 | Stop reason: HOSPADM

## 2021-06-21 RX ORDER — POTASSIUM CHLORIDE 1500 MG/1
20 TABLET, EXTENDED RELEASE ORAL
Status: COMPLETED | OUTPATIENT
Start: 2021-06-21 | End: 2021-06-22

## 2021-06-21 RX ORDER — FUROSEMIDE 20 MG
20 TABLET ORAL ONCE
Status: COMPLETED | OUTPATIENT
Start: 2021-06-21 | End: 2021-06-21

## 2021-06-21 RX ADMIN — CITALOPRAM HYDROBROMIDE 20 MG: 20 TABLET ORAL at 21:25

## 2021-06-21 RX ADMIN — FUROSEMIDE 20 MG: 20 TABLET ORAL at 08:05

## 2021-06-21 RX ADMIN — MICONAZOLE NITRATE: 20 POWDER TOPICAL at 21:27

## 2021-06-21 RX ADMIN — ATORVASTATIN CALCIUM 20 MG: 20 TABLET, FILM COATED ORAL at 08:04

## 2021-06-21 RX ADMIN — DIGOXIN 125 MCG: 125 TABLET ORAL at 08:03

## 2021-06-21 RX ADMIN — MICONAZOLE NITRATE: 20 POWDER TOPICAL at 17:47

## 2021-06-21 RX ADMIN — INSULIN GLARGINE 15 UNITS: 100 INJECTION, SOLUTION SUBCUTANEOUS at 21:27

## 2021-06-21 RX ADMIN — INSULIN ASPART 3 UNITS: 100 INJECTION, SOLUTION INTRAVENOUS; SUBCUTANEOUS at 17:46

## 2021-06-21 RX ADMIN — METOPROLOL SUCCINATE 100 MG: 100 TABLET, EXTENDED RELEASE ORAL at 08:04

## 2021-06-21 RX ADMIN — PANTOPRAZOLE SODIUM 40 MG: 40 TABLET, DELAYED RELEASE ORAL at 08:04

## 2021-06-21 RX ADMIN — ACETAMINOPHEN 650 MG: 325 TABLET, FILM COATED ORAL at 21:25

## 2021-06-21 RX ADMIN — ASPIRIN 81 MG CHEWABLE TABLET 81 MG: 81 TABLET CHEWABLE at 08:04

## 2021-06-21 RX ADMIN — LISINOPRIL 5 MG: 5 TABLET ORAL at 17:45

## 2021-06-21 RX ADMIN — FUROSEMIDE 20 MG: 20 TABLET ORAL at 08:04

## 2021-06-21 RX ADMIN — HEPARIN SODIUM 1050 UNITS/HR: 10000 INJECTION, SOLUTION INTRAVENOUS at 23:38

## 2021-06-21 RX ADMIN — LISINOPRIL 5 MG: 5 TABLET ORAL at 08:04

## 2021-06-21 RX ADMIN — ALLOPURINOL 300 MG: 300 TABLET ORAL at 08:04

## 2021-06-21 ASSESSMENT — MIFFLIN-ST. JEOR: SCORE: 1427.25

## 2021-06-21 NOTE — CONSULTS
[facility]    Cardiology Consultation     Date of Admission:  6/19/2021    Assessment & Plan   Tad Manning is a 80 year old male who was admitted on 6/19/2021. I was asked to see the patient for elevated troponin.    This is a very pleasant patient with past medical history includes CLL, chronic systolic heart failure, CAD, peripheral arterial disease with a right BKA, type 2 diabetes, chronic A. fib on Eliquis.    The patient suffered a mechanical fall.  The details of this are somewhat unclear.  He has had multiple falls in the past.  Patient denies any chest pain both currently as well as around the time of the fall.  He was down for approximately 4 hours.  He became completely exhausted trying to rise from the floor.    The patient was then seen in the emergency department was found to have an elevated troponin (trend is 2.3, 5.5, 5.0, down trended to 3.6).  EKG notes lateral ST depressions and very slight anterior ST elevation.  LVEF has declined to 20 to 25%.  Previous to this there is a transthoracic echocardiogram in 2018 notes an LVEF of 35 to 40%.    It should be noted that he recently resumed chemotherapy for CLL.    Most recent angiogram as an 2018 that noted moderate disease in the RCA, patent grafts.  Given that the patient does not have chest pain currently and did not have chest pain from the event is possible that this represents a demand event.  The patient worked himself to complete exhaustion attempting to arise on the floor.  He clearly has severe vascular disease and at least a moderate lesion 3 years ago.  Therefore demand ischemia is quite possible.  This can be very challenging to know for sure as the patient is somewhat of a poor historian.  Fortunately the troponin is downtrending.    His diabetes is completely uncontrolled with a hemoglobin A1c of 9.0.     Problems:  Non-ST elevation myocardial infarction  Fall with at least 4 hours of downtime per  patient report  CABG 1995 with LIMA to LAD, vein graft to OM1, vein graft to D1  Chronic systolic congestive heart failure  Pulmonary hypertension  CLL with recent resumption of chemotherapy  Severe peripheral arterial disease with right BKA  History of vertebral osteomyelitis  Paroxysmal atrial fibrillation on anticoagulation  Hypertension  Uncontrolled type 2 diabetes  Stage III CKD    Plan:  I believe it is entirely possible that the elevated troponin is related to demand ischemia.  However the patient has a decline in his LVEF and completely uncontrolled type 2 diabetes so disease progression is also very possible.  I believe we should optimize him from a volume standpoint and consider a coronary angiogram on Tuesday.    The patient states that he is somewhat short of breath when he lays flat.  He may be able to complete angiogram tomorrow however this will have to be assessed in the morning.    Additionally we will need time for Eliquis to wash out of his system.  Furthermore there are concerns that the patient is not taking medications reliably due to having a significantly elevated hemoglobin A1c.  We need to obtain corroborative evidence that he will be medication compliant prior to proceeding to the Cath Lab.    Otherwise continue aspirin, lisinopril, metoprolol, heparin infusion.    Continue Lipitor.  Obtain lipid panel and possibly consider increasing Lipitor to at least 40 mg.  There is not a recent LDL that I can find.    Prior to discharge the patient will need to resume oral anticoagulation.    Luis Felipe Gann MD     Code Status    Full Code    Reason for Consult   Reason for consult: Elevated troponin    Primary Care Physician   Gene Guevara    Chief Complaint   Fall    History is obtained from the patient    History of Present Illness   Tad Manning is a 80 year old male who was admitted on 6/19/2021. I was asked to see the patient for elevated troponin.    This is a very pleasant  patient with past medical history includes CLL, chronic systolic heart failure, CAD, peripheral arterial disease with a right BKA, type 2 diabetes, chronic A. fib on Eliquis.    The patient suffered a mechanical fall.  The details of this are somewhat unclear.  He has had multiple falls in the past.  Patient denies any chest pain both currently as well as around the time of the fall.  He was down for approximately 4 hours.  He became completely exhausted trying to rise from the floor.    The patient was then seen in the emergency department was found to have an elevated troponin.  LVEF has declined to 20 to 25%.  Previous to this there is a transthoracic echocardiogram in 2018 notes an LVEF of 35 to 40%.    It should be noted that he recently resumed chemotherapy for CLL.    Most recent angiogram as an 2018 that noted moderate disease in the RCA, patent grafts.  Given that the patient does not have chest pain currently and did not have chest pain from the event is possible that this represents a demand event.  The patient worked himself to complete exhaustion attempting to arise on the floor.  He clearly has severe vascular disease and at least a moderate lesion 3 years ago.  Therefore demand ischemia is quite possible.  This can be very challenging to know for sure as the patient is somewhat of a poor historian.  Fortunately the troponin is downtrending.    His diabetes is completely uncontrolled with a hemoglobin A1c of 9.0.     Past Medical History   I have reviewed this patient's medical history and updated it with pertinent information if needed.   Past Medical History:   Diagnosis Date     Arthritis      ASCVD (arteriosclerotic cardiovascular disease)      BMI 30.0-30.9,adult      BPH (benign prostatic hypertrophy)      Cellulitis      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)      Coronary artery disease     triple bypass 1995     Diabetes mellitus (H)      Diabetic  polyneuropathy (H)      History of blood transfusion      Hyperlipidaemia      Hypertension      Non-healing ulcer of foot (H)     Right     Noninflammatory pericardial effusion      Osteomyelitis of left foot (H)      PVD (peripheral vascular disease) (H)      Sebaceous cyst        Past Surgical History   I have reviewed this patient's surgical history and updated it with pertinent information if needed.  Past Surgical History:   Procedure Laterality Date     AMPUTATE LEG BELOW KNEE Right 10/8/2018    Procedure: AMPUTATE LEG BELOW KNEE;  OPEN RIGHT LOWER LEG AMPUTATION WITH WOUND VAC PLACEMENT    EBL: 50mL;  Surgeon: Amador Vick MD;  Location:  OR     AMPUTATE REVISION STUMP LOWER EXTREMITY Right 10/11/2018    Procedure: AMPUTATE REVISION STUMP LOWER EXTREMITY;  CLOSURE RIGHT BELOW KNEE AMPUTATION;  Surgeon: Amador Vick MD;  Location:  OR     AMPUTATE TOE(S)  1/10/2014    Procedure: AMPUTATE TOE(S);;  Surgeon: Amador Vick MD;  Location:  OR     APPLY WOUND VAC Right 10/8/2018    Procedure: APPLY WOUND VAC;;  Surgeon: Amador Vick MD;  Location:  OR     BONE MARROW BIOPSY, BONE SPECIMEN, NEEDLE/TROCAR  10/19/2012    Procedure: BIOPSY BONE MARROW;  BONE MARROW BIOPSY  (CONSCIOUS SED);  Surgeon: Ramon Quesada MD;  Location:  GI     BYPASS GRAFT FEMOROPOPLITEAL  1/10/2014    Procedure: BYPASS GRAFT FEMOROPOPLITEAL;  Left above knee popliteal to  Below knee popliteal bypass using transverse saphenous vein graft. Left 2nd Toe amputation;  Surgeon: Amador Vick MD;  Location:  OR     CARDIAC SURGERY      angioplasty with stent, triple bypass     EXCISE CYST GENERIC (LOCATION) N/A 2/1/2016    Procedure: EXCISE CYST GENERIC (LOCATION);  Surgeon: Amador Vick MD;  Location:  SD     GRAFT VEIN FROM EXTREMITY (LOCATION)  1/10/2014    Procedure: GRAFT VEIN FROM EXTREMITY (LOCATION);;  Surgeon: Amador Vick MD;  Location:  OR     LAMINECTOMY THORACIC ONE  LEVEL N/A 2017    Procedure: LAMINECTOMY THORACIC ONE LEVEL;  Surgeon: Marcelo Trent MD;  Location: [facility] OR     OPTICAL TRACKING SYSTEM FUSION POSTERIOR SPINE THORACIC THREE+ LEVELS N/A 2017    Procedure: OPTICAL TRACKING SYSTEM FUSION POSTERIOR SPINE THORACIC THREE+ LEVELS;  Surgeon: Marcelo Trent MD;  Location: [facility] OR     TONSILLECTOMY       VASCULAR SURGERY      ptcr legs       Prior to Admission Medications   Prior to Admission Medications   Prescriptions Last Dose Informant Patient Reported? Taking?   Ferrous Sulfate 324 (65 Fe) MG TBEC More than a month at Unknown time  Yes No   Sig: TK 1 T PO D   Magnesium Oxide 250 MG TABS 2021 at Unknown time  Yes Yes   Sig: Take 375 mg by mouth   ULTICARE SHORT 31G X 8 MM insulin pen needle 2021 at Unknown time  Yes Yes   alcohol swab prep pads 2021 at Unknown time  No Yes   Sig: Use to swab area of injection/jina as directed.   allopurinol (ZYLOPRIM) 300 MG tablet   Yes Yes   Sig: Take 300 mg by mouth daily   apixaban ANTICOAGULANT (ELIQUIS ANTICOAGULANT) 5 MG tablet 2021 at Unknown time  No Yes   Sig: Take 1 tablet (5 mg) by mouth 2 times daily   aspirin (ASA) 81 MG tablet 2021 at Unknown time Self No Yes   Sig: Take 1 tablet (81 mg) by mouth daily   atorvastatin (LIPITOR) 20 MG tablet 2021 at Unknown time Self No Yes   Sig: Take 1 tablet (20 mg) by mouth daily   blood glucose (NO BRAND SPECIFIED) test strip 2021 at Unknown time  No Yes   Sig: Use to test blood sugar 4 times daily or as directed.   blood glucose calibration (NO BRAND SPECIFIED) solution 2021 at Unknown time  No Yes   Si drop every 14 days Use 1 drop to calibrate blood glucose monitor every 14 days   blood glucose monitoring (NO BRAND SPECIFIED) meter device kit 2021 at Unknown time  No Yes   Sig: Use to test blood sugar 4 times daily or as directed.   cholecalciferol 1000 UNITS TABS 2021 at Unknown time Self No Yes   Sig: Take  1,000 Units by mouth daily   citalopram (CELEXA) 20 MG tablet 6/19/2021 at Unknown time Self Yes Yes   Sig: Take 20 mg by mouth every evening   digoxin (LANOXIN) 125 MCG tablet 6/19/2021 at Unknown time Self Yes Yes   Sig: Take 1 tablet (125 mcg) by mouth daily   furosemide (LASIX) 20 MG tablet 6/19/2021 at Unknown time Self No Yes   Sig: Take 1 tablet (20 mg) by mouth daily   hydrocortisone (CORTIZONE-10) 1 % ointment 6/19/2021 at Unknown time Self Yes Yes   Sig: Apply topically 2 times daily as needed for itching   insulin glargine (LANTUS SOLOSTAR) 100 UNIT/ML pen 6/19/2021 at Unknown time  No Yes   Sig: Inject 25 Units Subcutaneous At Bedtime Lantus Solostar Pen   lisinopril (ZESTRIL) 5 MG tablet 6/19/2021 at Unknown time  No Yes   Sig: Take 1 tablet (5 mg) by mouth daily   melatonin 5 MG tablet Past Week at Unknown time Self Yes Yes   Sig: Take 5 mg by mouth At Bedtime   metFORMIN (GLUCOPHAGE-XR) 750 MG 24 hr tablet 6/19/2021 at Unknown time Self Yes Yes   Sig: Take 750 mg by mouth 2 times daily (with meals)   metoprolol succinate ER (TOPROL-XL) 100 MG 24 hr tablet 6/19/2021 at Unknown time Self No Yes   Sig: Take 1 tablet (100 mg) by mouth daily   nitroglycerin (NITROSTAT) 0.4 MG sublingual tablet Unknown at Unknown time Self No No   Sig: For chest pain place 1 tablet under the tongue every 5 minutes for 3 doses. If symptoms persist 5 minutes after 1st dose call 911.   nystatin (MYCOSTATIN) 332293 UNIT/GM external cream 6/19/2021 at Unknown time Self Yes Yes   Sig: Apply topically daily as needed for dry skin    order for DME Unknown at Unknown time Self No No   Sig: Equipment being ordered: DH2 shoe   order for DME Unknown at Unknown time  No No   Sig: Equipment to be ordered:  Scale- Qty 1  Commander Flex - Qty. 1  Pulse-Oximeter - Qty. 1  Use as directed   pantoprazole (PROTONIX) 40 MG EC tablet More than a month at Unknown time Self Yes No   Sig: Take 40 mg by mouth daily   polyethylene glycol  (MIRALAX/GLYCOLAX) Packet Unknown at Unknown time Self Yes No   Sig: Take 1 packet by mouth daily as needed    sodium chloride (EQ SALINE NASAL SPRAY) 0.65 % nasal spray 2021 at Unknown time Self No Yes   Sig: Spray 1 spray into both nostrils daily as needed for congestion   thin (NO BRAND SPECIFIED) lancets 2021 at Unknown time  No Yes   Si each 4 times daily Use with lanceting device.   traZODone (DESYREL) 50 MG tablet 2021 at Unknown time Self Yes Yes   Sig: Take 150 mg by mouth At Bedtime   venetoclax (VENCLEXTA) 100 MG tablet 2021 at x1  Yes Yes   Sig: Take 100 mg by mouth 2 times daily      Facility-Administered Medications: None     Allergies   Allergies   Allergen Reactions     Blood Transfusion Related (Informational Only) Other (See Comments)     Patient has a history of a clinically significant antibody against RBC antigens.  A delay in compatible RBCs may occur.      Blood-Group Specific Substance Other (See Comments)     Patient has a suggestive Warm Auto-antibody. Blood products may be delayed. Draw patient 24 hours prior to transfusion. Draw one red top and two purple top tubes for all type and screen orders.       Social History   I have reviewed this patient's social history and updated it with pertinent information if needed. Tad Dumont Nigel  reports that he quit smoking about 26 years ago. His smoking use included cigarettes. He has a 60.00 pack-year smoking history. He has never used smokeless tobacco. He reports that he does not drink alcohol or use drugs.    Family History   I have reviewed this patient's family history and updated it with pertinent information if needed.   Family History   Problem Relation Age of Onset     Cancer Mother         leukemia       Review of Systems   The 12 point Review of Systems is negative other than noted in the HPI or here.     Physical Exam   Temp: 98.9  F (37.2  C) Temp src: Oral BP: (!) 147/80 Pulse: 78   Resp: 13 SpO2: 96 % O2  Device: None (Room air)    Vital Signs with Ranges  Temp:  [98.3  F (36.8  C)-98.9  F (37.2  C)] 98.9  F (37.2  C)  Pulse:  [78-97] 78  Resp:  [13-16] 13  BP: (147-149)/(80-89) 147/80  SpO2:  [96 %] 96 %  152 lbs 1.6 oz    GENERAL APPEARANCE: Alert and oriented x3. No acute distress.  SKIN: Inspection of the skin reveals no rashes, ulcerations, warm, dry  NECK: Supple and symmetric.   Mildly elevated JVP  LUNGS: Clear breath sounds throughout bilaterally, no wheezes, no rhonchi  CARDIOVASCULAR: S1, S2, regular rate and rhythm without any murmurs, gallops, rubs. The carotid pulses were normal and 2+ bilaterally without bruits. Peripheral pulses were 2+ and symmetric.  ABDOMEN: Soft, non-tender, non-distended with normal bowel sounds.  No ascites noted.  EXTREMITIES: Right BKA, trace edema in the left lower extremity  NEUROLOGIC:  Normal mood and affect.  Sensation to touch was normal.      Data   Results for orders placed or performed during the hospital encounter of 06/19/21 (from the past 24 hour(s))   Troponin I - Now then in 4 hours x 2   Result Value Ref Range    Troponin I ES 5.095 (HH) 0.000 - 0.045 ug/L   Comprehensive metabolic panel   Result Value Ref Range    Sodium 134 133 - 144 mmol/L    Potassium 3.9 3.4 - 5.3 mmol/L    Chloride 102 94 - 109 mmol/L    Carbon Dioxide 24 20 - 32 mmol/L    Anion Gap 8 3 - 14 mmol/L    Glucose 283 (H) 70 - 99 mg/dL    Urea Nitrogen 31 (H) 7 - 30 mg/dL    Creatinine 1.36 (H) 0.66 - 1.25 mg/dL    GFR Estimate 49 (L) >60 mL/min/[1.73_m2]    GFR Estimate If Black 57 (L) >60 mL/min/[1.73_m2]    Calcium 9.5 8.5 - 10.1 mg/dL    Bilirubin Total 0.9 0.2 - 1.3 mg/dL    Albumin 3.3 (L) 3.4 - 5.0 g/dL    Protein Total 6.7 (L) 6.8 - 8.8 g/dL    Alkaline Phosphatase 72 40 - 150 U/L    ALT 24 0 - 70 U/L    AST 34 0 - 45 U/L   CBC with platelets differential   Result Value Ref Range    WBC 3.6 (L) 4.0 - 11.0 10e9/L    RBC Count 3.20 (L) 4.4 - 5.9 10e12/L    Hemoglobin 10.0 (L) 13.3 - 17.7  g/dL    Hematocrit 29.4 (L) 40.0 - 53.0 %    MCV 92 78 - 100 fl    MCH 31.3 26.5 - 33.0 pg    MCHC 34.0 31.5 - 36.5 g/dL    RDW 14.6 10.0 - 15.0 %    Platelet Count 116 (L) 150 - 450 10e9/L    Diff Method Automated Method     % Neutrophils 51.3 %    % Lymphocytes 28.7 %    % Monocytes 19.1 %    % Eosinophils 0.0 %    % Basophils 0.3 %    % Immature Granulocytes 0.6 %    Nucleated RBCs 0 0 /100    Absolute Neutrophil 1.8 1.6 - 8.3 10e9/L    Absolute Lymphocytes 1.0 0.8 - 5.3 10e9/L    Absolute Monocytes 0.7 0.0 - 1.3 10e9/L    Absolute Eosinophils 0.0 0.0 - 0.7 10e9/L    Absolute Basophils 0.0 0.0 - 0.2 10e9/L    Abs Immature Granulocytes 0.0 0 - 0.4 10e9/L    Absolute Nucleated RBC 0.0    NT proBNP inpatient and ED   Result Value Ref Range    N-Terminal Pro BNP Inpatient 7,364 (H) 0 - 1,800 pg/mL   Hemoglobin A1c   Result Value Ref Range    Hemoglobin A1C 9.0 (H) 0 - 5.6 %   Digoxin level   Result Value Ref Range    Digoxin Level 0.4 (L) 0.5 - 2.0 ug/L   Heparin Unfractionated Anti Xa Level   Result Value Ref Range    Heparin Unfractionated Anti Xa Level 0.23 IU/mL   Partial thromboplastin time   Result Value Ref Range    PTT 46 (H) 22 - 37 sec   Troponin I   Result Value Ref Range    Troponin I ES 3.629 (HH) 0.000 - 0.045 ug/L   Glucose by meter   Result Value Ref Range    Glucose 404 (H) 70 - 99 mg/dL   Partial thromboplastin time   Result Value Ref Range    PTT 64 (H) 22 - 37 sec   Uric acid   Result Value Ref Range    Uric Acid 6.1 3.5 - 7.2 mg/dL   Phosphorus   Result Value Ref Range    Phosphorus 3.3 2.5 - 4.5 mg/dL   Glucose by meter   Result Value Ref Range    Glucose 332 (H) 70 - 99 mg/dL   Partial thromboplastin time   Result Value Ref Range    PTT 58 (H) 22 - 37 sec

## 2021-06-21 NOTE — PLAN OF CARE
VSS. Denies pain. Pt was seen by C RN today. He now has a pulsate mattress. Turn and reposition. Heparin is at 1050. Will continue to monitor.   Detail Level: Simple

## 2021-06-21 NOTE — CONSULTS
WO Nurse Inpatient Wound Assessment   Reason for consultation: Evaluate and treat  Sacral/gluteal cleft/bilateral buttock/groin wounds    Assessment  Sacral/gluteal cleft/bilateral buttock/ groin wounds due to Mixed Etiology: MASD with possible fungal and pressure components  Status: initial assessment     Moist pink to red, nonblanchable rash  consistent with brief placement from groin to sacrum including bilateral buttock, all POA. Scattered scabs to bilateral IT and linear scratch to coccyx. Patient reports sitting in urine and stool soiled brief frequently at home, and damaged skin area is consistent with poor perineal hygiene.   Niece reports finding patient soaked in urine at home and voices concerns of patients ability to appropriately perform adequate hygiene at home  Treatment Plan  Perineal/Sacral/gluteal cleft/ groin wounds:effective now  - perineal cares with Lonny Wipes and water, no barrier cream needed here  - dust with antifungal powder x 5 days, switching to baby powder on June 24, 2021  - x 3 days, BID- do VASHE soaks to the bilateral groin, gluteal ceft     Wet gauze with VASHE and press to the moist, effected tissue, let sit and soak x 30minutes, wipe dry and dust with powder  NOTE- position pt side to side only in bed, every 2 hours  - when up to the chair do not sit for longer than 2 hours before standing or returning to bed, to fully off load and air out tissue.     -Low air loss mattress ordered  Orders Written  Recommended provider order: Antifungal WBP to Dr. Lance requesting order  WO Nurse follow-up plan:weekly  Nursing to notify the Provider(s) and re-consult the WO Nurse if wound(s) deteriorates or new skin concern.    Patient History  According to provider note(s):  Tad Manning is a 80 year old male with extensive past medical history that is most notable for CLL, CAD, chronic systolic CHF, PAD status post right lower extremity BKA, Type 2 Diabetes mellitus, and chronic  atrial fibrillation on Eliquis, among multiple others; who presents with recurrent falls and generalized weakness and is found to have possible ACS.    Objective Data  Containment of urine/stool: Incontinence Protocol and Incontinent pad in bed    Active Diet Order  Orders Placed This Encounter      Moderate Consistent CHO Diet      Output:   I/O last 3 completed shifts:  In: 1276 [P.O.:1040; I.V.:236]  Out: 1530 [Urine:1530]    Risk Assessment:   Sensory Perception: 2-->very limited  Moisture: 3-->occasionally moist  Activity: 2-->chairfast  Mobility: 2-->very limited  Nutrition: 4-->excellent  Friction and Shear: 1-->problem  Roberto Score: 14                          Labs:   Recent Labs   Lab 06/21/21  0522 06/20/21  0707   ALBUMIN  --  3.3*   HGB 10.6* 10.0*   WBC 3.9* 3.6*   A1C  --  9.0*       Physical Exam  Areas of skin assessed: focused perineal, gluteal cleft, bilateral buttock and sacrum     Wound Location:  Perineum, gluteal cleft, bilateral buttock, and sacrum  Date of last photo 6-21-21        Wound History: Patient reports using incontinence pad and changing once a day, meaning patient is likely sitting in urine and stool most of day. He reports sitting in a bandar recliner most of day. He does not have hygiene assistance at home.    Wound Base: Patient has moist blanchable erythema throughout perineal area to bilateral groin, from base of scrotum to sacrum covering bilateral buttock. Flaking scabbing noted to bilateral  ITs and noted excoriated area to R of coccyx.     Palpation of the wound bed: normal      Drainage: none     Description of drainage: none     Measurements (length x width x depth, in cm) approximately 30  x 33  x 0.1 cm   Periwound skin: erythema- blanchable and rash      Color: pink      Temperature: normal}  Odor: mild  Pain: denies pain during treatment but does reports pain to this area when sitting, none  Pain intervention prior to dressing change: none    Interventions  Visual  inspection and assessment completed   Wound Care Rationale Protect periwound skin, Provide protection  and Decrease bacterial load  Wound Care: cleansed with bath wipes and applied baby powder at this time  Supplies: ordered: Geri and asked Dr. Lance for miconazole powder  Current off-loading measures: Pillows  Current support surface: Standard  Atmos Air mattress  Education provided to: importance of repositioning, plan of care, Moisture management, Hygiene and Off-loading pressure  Discussed plan of care with Patient, Family and Nurse    Loree Ruiz RN CWOCN

## 2021-06-21 NOTE — PROGRESS NOTES
[facility]    Hospitalist Progress Note      Assessment & Plan   Tad Manning is a 80 year old male with extensive past medical history that is most notable for CLL, CAD, chronic systolic CHF, PAD status post right lower extremity BKA, Type 2 Diabetes mellitus, and chronic atrial fibrillation on Eliquis, among multiple others; who presents with recurrent falls and generalized weakness and is found to have possible ACS.    Possible ACS and acute metabolic encephalopathy  Ischemic cardiomyopathy--worsening, EF 20-25%  Of note, he has multi-vessel CAD, status post CABG (L-LAD, SVG-OM1, SVG-D1) in 1995, followed more recently by Left Heart catheterization (done pre-operatively) in 3/2018 which reportedly showed moderate disease in his RCA, severe LAD disease and patent grafts: he had two drug eluting stents placed in the proximal and mid LAD. Right sided catheterization done concomitantly showed elevated right and left sided pressures with type II pulmonary hypertension. he also has known ischemic cardiomyopathy causing chronic systolic CHF (Stage C, NYHA 3), most recently having LVEF 35-40% on TTE in 2018. He is followed by Dr. Valdes of [facility]. He presents for falls at home, with reported increased exertional dyspnea and confusion. Troponin is positive in the ED. He could have ACS, though alternatively he could have demand ischemia due to other cause of acute illness such as occult infection. Acute systolic CHF exacerbation is possible, though he appeared hypovolemic in the ED initially and IV fluid was given. Last, we note that he just resumed chemotherapy as below for CLL and this could be playing a role in his weakness. We note that UA and XR are negative for signs of infection there. There are no other evident acute neurologic deficits at this time to suggest stroke. He could have underlying as yet undiagnosed dementia  Trop 2.304-->5.523-->5.095-->3.629.  BNP 7364. Lipid  profile with HDL 38, LDL 49, total cholesterol 116  * 6/20 echocardiogram: EF 20-25%, RV normal, LA moderately dilated. Aortic valve sclerosis. Mild mitral regurgitation   -- cardiology consult appreciated, likely cardiac cath on 6/22 (also consideration for stress testing)  -- Heparin gtt for now  -- Resumed ASA  -- Digoxin level low at 0.4--there is question of compliance outpatient, resumed PTA dose 6/20   -- continue PTA toprol XL 100mg, lisinopril -->increased to 10mg once daily on 6/21  -- resumed lasix, give 40mg once 6/21, then resume his PTA 20mg 6/22  -- continue atorvastatin 20mg daily  -- Fall precautions   -- q8 neuro checks  -- plan for cardiac event monitor on discharge     CLL  Followed by [facility], treated with steroids for hemolytic anemia in 2012 and received chemotherapy at that time. More recently in the past year he has been found to have recurrence. In 4/2021 it seems he saw his [facility] team in follow up and since then has started Venetoclax.  -- oncology appreciated  -- continue PTA venetoclax at 100mg (await oncology to clarify if this is his dosing vs 100mg BID) and allopurinol 300mg daily     PAD  He is status post a left above-knee popliteal to below-knee popliteal bypass in 2014.  He then underwent a R leg below knee amputation in Oct 2018. He last saw Dr. Vick of Vascular Surgery in 12/2020 (at a virtual visit).   - Noted.     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 Noted.  -- await PT/OT evaluations     Paroxysmal atrial fibrillation  CHADS-VASC noted to be at least 6. He is meant to be on Eliquis but it seems he has been mis-adherent to medications at home.  --heparin gtt for now, plan to resume eliquis post cardiac cath     Hypertension  -  resumed PTA lisinopril 5mg daily, toprol XL 100mg daily    Dyslipidemia  - lipitor resumed at 20mg. Lipid profile with HDL 38, LDL 49, total cholesterol 116     Type 2 Diabetes mellitus  On Lantus, Metformin as an outpatient. His last admission here was 1/2020 for hyperglycemia and STORM and started on insulin at that time. Currently FS near 500. He does not appear to have been consistently taking his medications at home. A1C is 9  -- medium resistance sliding scale insulin  -- 6 units TIDWM novolog starting 6/22  -- 15 units Lantus at bedtime--keep at this dose for 6/21 given he will be NPO on 6/22, will further uptitrate to 25 units on 6/22 evening     Pseudohyponatremia  Corrects to 136. Low sodium related to high BGs on admit     Chronic anemia, thrombocytopenia  Noted, seems near baseline     Stage 3 CKD  Cr 1.4 which seems to be very near his recent previous values.   - Daily BMP     Chronic Stage 2 sacral decubitus ulcers  Present on admission.   - WOC consulted      BPH  By history. Noted.     Depression  PTA Celexa continued   - PTA trazodone resumed at reduced dose, 50mg (instead of 150mg), given falls/confusion    GERD  PTA PPI, not used recently  - resumed     COVID-19 screening-negative    DVT Prophylaxis: heparin gtt  Code Status: Full Code  Expected discharge: 3-4 days pending therapy evals and completion of cardiac work-up    Liset Lance, DO  Text Page (7am - 6pm)    Interval History   Patient seen and examined. He denies any complaints today. Reports his breathing is comfortable, that he is able to lay flat but admits he has some trouble remembering. He is tolerating his breakfast well. Denies pain anywhere. No chest pain, shortness of breath, nausea, vomiting, fevers, chills    -Data reviewed today: I reviewed all new labs and imaging results over the last 24 hours. I personally reviewed no images or EKG's today.    Physical Exam   Temp: 97.4  F (36.3  C) Temp src: Axillary BP: (!) 139/95 Pulse:  78   Resp: 13 SpO2: 95 % O2 Device: None (Room air)    Vitals:    06/19/21 2238 06/20/21 0300 06/21/21 0526   Weight: 76.7 kg (169 lb) 69 kg (152 lb 1.6 oz) 70.1 kg (154 lb 8.7 oz)     Vital Signs with Ranges  Temp:  [97.4  F (36.3  C)-98.9  F (37.2  C)] 97.4  F (36.3  C)  Pulse:  [78] 78  Resp:  [13] 13  BP: (139)/(95) 139/95  SpO2:  [95 %] 95 %  I/O last 3 completed shifts:  In: 1276 [P.O.:1040; I.V.:236]  Out: 1530 [Urine:1530]    Constitutional: Awake, alert, cooperative, no apparent distress  Respiratory: Clear to auscultation bilaterally, no crackles or wheezing  Cardiovascular: Regular rhythm and rate, normal S1 and S2, and no murmur noted  GI: Normal bowel sounds, soft, non-distended, non-tender  Skin/Integumen: No rashes, no cyanosis, no edema. Stage 2 sacral decubitus ulcer noted. Right BKA noted with some abrasions, mild redness (baseline per patient)  Other: Oriented to person, place, situation. Memory poor for details    Medications     Continuing ACE inhibitor/ARB/ARNI from home medication list OR ACE inhibitor/ARB order already placed during this visit       - MEDICATION INSTRUCTIONS -       - MEDICATION INSTRUCTIONS -       heparin 1,050 Units/hr (06/21/21 0030)     - MEDICATION INSTRUCTIONS -         allopurinol  300 mg Oral Daily     aspirin  81 mg Oral Daily     atorvastatin  20 mg Oral Daily     citalopram  20 mg Oral QPM     digoxin  125 mcg Oral Daily     furosemide  20 mg Oral Daily     heparin ANTICOAGULANT Loading dose  60 Units/kg Intravenous Once     insulin aspart  4 Units Subcutaneous TID w/meals     insulin aspart  1-7 Units Subcutaneous TID AC     insulin aspart  1-5 Units Subcutaneous At Bedtime     insulin glargine  15 Units Subcutaneous At Bedtime     [START ON 6/22/2021] lisinopril  10 mg Oral Daily     lisinopril  5 mg Oral Once     metoprolol succinate ER  100 mg Oral Daily     miconazole   Topical TID     pantoprazole  40 mg Oral Daily     sodium chloride (PF)  3 mL  Intracatheter Q8H     venetoclax  100 mg Oral Daily with lunch       Data   Recent Labs   Lab 06/21/21  0522 06/20/21  1209 06/20/21  0707 06/20/21  0336 06/19/21  2237   WBC 3.9*  --  3.6* 3.7* 4.8   HGB 10.6*  --  10.0* 9.9* 11.2*   MCV 94  --  92 91 91   *  --  116* 131* 145*     --  134  --  128*   POTASSIUM 4.3  --  3.9  --  4.4   CHLORIDE 104  --  102  --  95   CO2 23  --  24  --  24   BUN 33*  --  31*  --  33*   CR 1.32*  --  1.36*  --  1.40*   ANIONGAP 6  --  8  --  9   JUSTINO 9.4  --  9.5  --  10.0   *  --  283*  --  487*   ALBUMIN  --   --  3.3*  --  4.0   PROTTOTAL  --   --  6.7*  --  7.7   BILITOTAL  --   --  0.9  --  1.1   ALKPHOS  --   --  72  --  84   ALT  --   --  24  --  26   AST  --   --  34  --  32   TROPI  --  3.629* 5.095* 5.523* 2.304*       No results found for this or any previous visit (from the past 24 hour(s)).

## 2021-06-21 NOTE — PROGRESS NOTES
[facility]ology Progress Note     Primary Oncologist/Hematologist:  Dr. England          Assessment and Plan:     1.  Recent fall, etiology unclear.    - cardiac work up in progress. Event monitor being considered at discharge.     2.  NSTEMI. Troponin 2.3 on admission, 5.5 on recheck, then trending down. He denies recent chest pain or shortness of breath. Echo  shows global  Hypokinesis but EF has declined.   -  Currently on heparin gtt, aspirin, statin, and beta blocker.   - note plans for heart cath.      3.  Chronic lymphocytic leukemia, in second partial remission.    --Has been receiving Venetoclax and Rituxan  -- Is due for Venetoclax later this week. Can postpone until discharged from hospital.    -- Dr. England's notes indicate that pt should be taking venetoclax 100 mg daily.  Pt is not sure what he is taking but suggests it might be 100 mg BID.  Will keep at 100 mg daily dosing until I can review with Dr. England  - monitor blood counts serially.  4.  Cardiomyopathy, etiology unclear.We reviewed his recent treatment and explained that venetoclax is not typically associated with cardiomyopathy; therefore, underlying progression of underlying coronary artery disease would be a possible consideration.    5.  Problem diabetes mellitus with poor long-term disease control.       Discussed with niece at bedside.     OFELIA Nix, Nurse Practitioner  [facility]  [phone]          Interval History:     Feels OK. Can't remember his dose of venetoclax.               Review of Systems:     The 5 point Review of Systems is negative other than noted in the HPI                Medications:   Scheduled Medications    allopurinol  300 mg Oral Daily     aspirin  81 mg Oral Daily     atorvastatin  20 mg Oral Daily     citalopram  20 mg Oral QPM     digoxin  125 mcg Oral Daily     furosemide  20 mg Oral Daily     heparin ANTICOAGULANT Loading dose  60 Units/kg Intravenous Once     insulin  "aspart  4 Units Subcutaneous TID w/meals     insulin aspart  1-7 Units Subcutaneous TID AC     insulin aspart  1-5 Units Subcutaneous At Bedtime     insulin glargine  15 Units Subcutaneous At Bedtime     [START ON 6/22/2021] lisinopril  10 mg Oral Daily     lisinopril  5 mg Oral Once     metoprolol succinate ER  100 mg Oral Daily     miconazole   Topical TID     pantoprazole  40 mg Oral Daily     sodium chloride (PF)  3 mL Intracatheter Q8H     venetoclax  100 mg Oral Daily with lunch     PRN Medications  acetaminophen, acetaminophen, bisacodyl, Continuing ACE inhibitor/ARB/ARNI from home medication list OR ACE inhibitor/ARB order already placed during this visit, - MEDICATION INSTRUCTIONS -, - MEDICATION INSTRUCTIONS -, glucose **OR** dextrose **OR** glucagon, HOLD MEDICATION, lidocaine 4%, lidocaine (buffered or not buffered), - MEDICATION INSTRUCTIONS -, melatonin, ondansetron **OR** ondansetron, polyethylene glycol, senna-docusate **OR** senna-docusate, sodium chloride (PF), traZODone               Physical Exam:   Vitals were reviewed  Blood pressure 132/69, pulse 71, temperature 97.7  F (36.5  C), temperature source Oral, resp. rate 18, height 1.778 m (5' 10\"), weight 70.1 kg (154 lb 8.7 oz), SpO2 100 %.  Wt Readings from Last 4 Encounters:   06/21/21 70.1 kg (154 lb 8.7 oz)   04/18/21 76.8 kg (169 lb 4.8 oz)   05/18/20 78.6 kg (173 lb 4.8 oz)   02/20/20 78.2 kg (172 lb 6.4 oz)       I/O last 3 completed shifts:  In: 536 [P.O.:300; I.V.:236]  Out: 1055 [Urine:1055]                 Data:   All laboratory data and imaging studies reviewed.    CMP  Recent Labs   Lab 06/21/21  0522 06/20/21  1610 06/20/21  0707 06/19/21  2237     --  134 128*   POTASSIUM 4.3  --  3.9 4.4   CHLORIDE 104  --  102 95   CO2 23  --  24 24   ANIONGAP 6  --  8 9   *  --  283* 487*   BUN 33*  --  31* 33*   CR 1.32*  --  1.36* 1.40*   GFRESTIMATED 51*  --  49* 48*   GFRESTBLACK 59*  --  57* 55*   JUSTINO 9.4  --  9.5 10.0   MAG " 2.1  --   --   --    PHOS 3.5 3.3  --   --    PROTTOTAL  --   --  6.7* 7.7   ALBUMIN  --   --  3.3* 4.0   BILITOTAL  --   --  0.9 1.1   ALKPHOS  --   --  72 84   AST  --   --  34 32   ALT  --   --  24 26     CBC  Recent Labs   Lab 06/21/21  0522 06/20/21  0707 06/20/21  0336 06/19/21 2237   WBC 3.9* 3.6* 3.7* 4.8   RBC 3.45* 3.20* 3.14* 3.60*   HGB 10.6* 10.0* 9.9* 11.2*   HCT 32.3* 29.4* 28.7* 32.7*   MCV 94 92 91 91   MCH 30.7 31.3 31.5 31.1   MCHC 32.8 34.0 34.5 34.3   RDW 14.6 14.6 14.6 14.6   * 116* 131* 145*     INRNo lab results found in last 7 days.        Claribel Brady CNP  Nurse Practitioner  [facility]  [phone]

## 2021-06-21 NOTE — PLAN OF CARE
OT/CR- Orders received. Per chart , coronary angiogram on Tuesday. Will hold evaluation until after.

## 2021-06-21 NOTE — UTILIZATION REVIEW
Admission Status; Secondary Review Determination       Under the authority of the Utilization Management Committee, the utilization review process indicated a secondary review on the above patient. The review outcome is based on review of the medical records, discussions with staff, and applying clinical experience noted on the date of the review.     (x) Inpatient Status Appropriate - This patient's medical care is consistent with medical management for inpatient care and reasonable inpatient medical practice.     RATIONALE FOR DETERMINATION   81 yo man with complex medical history including multi-vessel CAD, s/p CABG, h/o BHAVNA placement, CLL, CAD, chronic systolic CHF, PAD status post right lower extremity BKA, Type 2 Diabetes mellitus on chronic insulin, and chronic atrial fibrillation on Eliquis, among multiple other diagnoses who presented with acute encephalopathy, chest pain, progressive dyspnea. BNP significantly elevated at 7,300 and troponins elevated at 2.3=>5.095=>3.629. Cardiology consultation obtained. Echocardiogram of the heart revealed severley reduced systolic function with EF 20-25% which is new. Moderately dilated left ventricle. Remains on continuous heparin infusion. Anticipate coronary angiogram tomorrow to allow eliquis to wash out of system longer for safer testing. Has already been in the hospital 2 midnights and is not discharging today.     At the time of admission with the information available to the attending physician more than 2 nights hospital complex care was anticipated, based on patient risk of adverse outcome if treated as outpatient and complex care required. Inpatient admission is appropriate based on the Medicare guidelines.     This document was produced using voice recognition software.    The information on this document is developed by the utilization review team in order for the business office to ensure compliance. This only denotes the appropriateness of proper  admission status and does not reflect the quality of care rendered.   The definitions of Inpatient Status and Observation Status used in making the determination above are those provided in the CMS Coverage Manual, Chapter 1 and Chapter 6, section 70.4.     Sincerely,   Sandra Marquez MD  Utilization Review  Physician Advisor  [facility].

## 2021-06-21 NOTE — PROGRESS NOTES
Pt disoriented to time, once reoriented he is sharp.   Pt teaching on his reddened non blanchable coccyx, advised to stay off of bottom even though it is most comfortable, Pt agreeable, but typically rolls back on his back. Rash throughout bottom and [place] area. Plan to stop heparin today and consider discharge planning.

## 2021-06-21 NOTE — PROGRESS NOTES
[facility]  Cardiology Progress Note    Outpatient cardiologist: Dr. Valdes at [facility]  Date of Service (when I saw the patient): 06/21/2021  Summary: Tad Manning is a 80 year old male with history of coronary artery disease s/p CABG in 1995 with a L-LAD, SVG-OM1, SVG-D1 with subsequent PCI to the mid LAD in 2018, an ischemic cardiomyopathy with LVEF of 35 - 40% in 2018, chronic systolic heart failure, atrial fibrillation maintained on Eliquis, PA s/p RLE BKA, CLL with recent resumption of chemotherapy, hypertension, CKD stage III, uncontrolled DM, vertebral osteomyelitis in 2017 who was admitted on 6/19/2021 with a fall and generalized weakness.   Interval History   We reviewed events leading to his admission. He cannot tell me details about his falls and is unclear if he lost consciousness. he denies shortness of breath, orthopnea, PND, or chest pain. There has been some concern about him taking his medications appropriately at home. He manages his medications at home and tells me he lives with a roommate who helps him get to appointments. Niece at bedside.   Assessment & Plan   1.  Fall. Events surrounding falls PTA are unclear. Reportedly he was down for at least 4 hours.   -  Recommend therapy evaluation.  -  Consider event monitor at d/c  2.  NSTEMI. Troponin 2.3 on admission, 5.5 on recheck, then trending dowm. He denies recent chest pain or shortness of breath. Echo as above shows global  Hypokinesis but EF has declined.   -  Currently on heparin gtt, aspirin, statin, and beta blocker.   3.  Coronaray artery disease. With remote CABG x 3 in 1995 with a LIMA-LAD, SVG-OM1, SVG-D1. Angiogram in 2018 showed patent grafts but significant obstructive disease of the native LAD after the LIMA anastomosis which was treated with a BHAVNA. He had moderate RCA disease.  -  On Asprin, atorvastatin 20 mg daily prior to admission.   -  LDL well controlled at 49   4.  Ischemic cardiomyopathy.  Last echo in 2018 showed some improvement in his LVEF at 35 - 40%. Historically has been closer to 25%.  On Metoprolol  mg daily, lisinopril 5 mg, and digoxin daily prior to admission. NTproBNP 7300 on admission.   -  Repeat echo here shows a decline in his EF compared to 2018 with a LVEF around 20 - 25% and global hypokinesis. RV function is normal. No significant valvular disease.   5.  Chronic systolic congestive heart failure. On lasix 20 mg daily PTA. He appears compensated on exam.   -  Received an extra 20 mg of lasix 6/21.   6. CLL. Chemotherapy resumed recently with rituximab and venetoclax.  -  Oncology following.   7. PAF. On Eliquis prior to admission although concern about his compliance with that. This is currently on hold while on a heparin drip.   -  Currently in NSR.   8.  PAD. S/p R BKA in 2018 and prior LE stents and bypass on the L.   9.  DM. Hgba1c elevated at 9.0.  10.  CKD stage III. Creatinine stable between 1.3 - 1.4.   11.  Hypertension.     Plan:  1.  Will discuss plans for further evaluation of his coronary disease with stress testing vs angiogram.  2.  Continue medical therapy for his coronary disease and cardiomyopathy. He has blood pressure room to further optimize his GDMT. Would consider increasing his lisinopril or possibly transitioning him to Entresto.  3.  Continue lasix 20 mg daily.     Genevieve Cain PA-C    Physical Exam   Temp: 97.4  F (36.3  C) Temp src: Axillary BP: (!) 139/95 Pulse: 78   Resp: 13 SpO2: 95 % O2 Device: None (Room air)    Vitals:    06/19/21 2238 06/20/21 0300 06/21/21 0526   Weight: 76.7 kg (169 lb) 69 kg (152 lb 1.6 oz) 70.1 kg (154 lb 8.7 oz)     Vital Signs with Ranges  Temp:  [97.4  F (36.3  C)-98.9  F (37.2  C)] 97.4  F (36.3  C)  Pulse:  [78-94] 78  Resp:  [13] 13  BP: (139-147)/(80-95) 139/95  SpO2:  [95 %] 95 %  06/16 0700 - 06/21 0659  In: 1319.8 [P.O.:1040; I.V.:279.8]  Out: 1630 [Urine:1630]  Net: -310.2  Constitutional: NAD.    Respiratory: CTAB.   Cardiovascular: RRR, s1s2, no murmur  GI: soft, BS+  Skin: warm, no rashes  Musculoskeletal: Moving all extremities. No edema. R BKA noted.  Neurologic: Alert, oriented x 3  Neuropsychiatric: Normal affect   Data   Results for orders placed or performed during the hospital encounter of 06/19/21 (from the past 24 hour(s))   Troponin I   Result Value Ref Range    Troponin I ES 3.629 (HH) 0.000 - 0.045 ug/L   Glucose by meter   Result Value Ref Range    Glucose 404 (H) 70 - 99 mg/dL   Partial thromboplastin time   Result Value Ref Range    PTT 64 (H) 22 - 37 sec   Uric acid   Result Value Ref Range    Uric Acid 6.1 3.5 - 7.2 mg/dL   Phosphorus   Result Value Ref Range    Phosphorus 3.3 2.5 - 4.5 mg/dL   Glucose by meter   Result Value Ref Range    Glucose 332 (H) 70 - 99 mg/dL   Partial thromboplastin time   Result Value Ref Range    PTT 58 (H) 22 - 37 sec   CBC with platelets   Result Value Ref Range    WBC 3.9 (L) 4.0 - 11.0 10e9/L    RBC Count 3.45 (L) 4.4 - 5.9 10e12/L    Hemoglobin 10.6 (L) 13.3 - 17.7 g/dL    Hematocrit 32.3 (L) 40.0 - 53.0 %    MCV 94 78 - 100 fl    MCH 30.7 26.5 - 33.0 pg    MCHC 32.8 31.5 - 36.5 g/dL    RDW 14.6 10.0 - 15.0 %    Platelet Count 136 (L) 150 - 450 10e9/L   Basic metabolic panel   Result Value Ref Range    Sodium 133 133 - 144 mmol/L    Potassium 4.3 3.4 - 5.3 mmol/L    Chloride 104 94 - 109 mmol/L    Carbon Dioxide 23 20 - 32 mmol/L    Anion Gap 6 3 - 14 mmol/L    Glucose 268 (H) 70 - 99 mg/dL    Urea Nitrogen 33 (H) 7 - 30 mg/dL    Creatinine 1.32 (H) 0.66 - 1.25 mg/dL    GFR Estimate 51 (L) >60 mL/min/[1.73_m2]    GFR Estimate If Black 59 (L) >60 mL/min/[1.73_m2]    Calcium 9.4 8.5 - 10.1 mg/dL   Phosphorus   Result Value Ref Range    Phosphorus 3.5 2.5 - 4.5 mg/dL   Magnesium   Result Value Ref Range    Magnesium 2.1 1.6 - 2.3 mg/dL   Lipid Profile   Result Value Ref Range    Cholesterol 116 <200 mg/dL    Triglycerides 145 <150 mg/dL    HDL Cholesterol  38 (L) >39 mg/dL    LDL Cholesterol Calculated 49 <100 mg/dL    Non HDL Cholesterol 78 <130 mg/dL   Glucose by meter   Result Value Ref Range    Glucose 255 (H) 70 - 99 mg/dL       Echocardiogram   1. Left ventricular systolic function is severely reduced. The visual ejection  fraction is estimated at 20-25%.  2. The right ventricle is normal in structure, function and size.  3. The left atrium is moderately dilated.  4. The aortic valve is trileaflet with aortic valve sclerosis.  5. There is mild (1+) mitral regurgitation.    Tele NSR    Medications     Continuing ACE inhibitor/ARB/ARNI from home medication list OR ACE inhibitor/ARB order already placed during this visit       - MEDICATION INSTRUCTIONS -       - MEDICATION INSTRUCTIONS -       heparin 1,050 Units/hr (06/21/21 0030)     - MEDICATION INSTRUCTIONS -         allopurinol  300 mg Oral Daily     aspirin  81 mg Oral Daily     atorvastatin  20 mg Oral Daily     citalopram  20 mg Oral QPM     digoxin  125 mcg Oral Daily     furosemide  20 mg Oral Daily     heparin ANTICOAGULANT Loading dose  60 Units/kg Intravenous Once     insulin aspart  4 Units Subcutaneous TID w/meals     insulin aspart  1-7 Units Subcutaneous TID AC     insulin aspart  1-5 Units Subcutaneous At Bedtime     insulin glargine  15 Units Subcutaneous At Bedtime     lisinopril  5 mg Oral Daily     metoprolol succinate ER  100 mg Oral Daily     pantoprazole  40 mg Oral Daily     sodium chloride (PF)  3 mL Intracatheter Q8H     venetoclax  100 mg Oral Daily with lunch

## 2021-06-22 LAB
ANION GAP SERPL CALCULATED.3IONS-SCNC: 7 MMOL/L (ref 3–14)
APTT PPP: 54 SEC (ref 22–37)
APTT PPP: 86 SEC (ref 22–37)
BUN SERPL-MCNC: 36 MG/DL (ref 7–30)
CALCIUM SERPL-MCNC: 9.2 MG/DL (ref 8.5–10.1)
CHLORIDE SERPL-SCNC: 105 MMOL/L (ref 94–109)
CO2 SERPL-SCNC: 21 MMOL/L (ref 20–32)
CREAT SERPL-MCNC: 1.34 MG/DL (ref 0.66–1.25)
ERYTHROCYTE [DISTWIDTH] IN BLOOD BY AUTOMATED COUNT: 14.4 % (ref 10–15)
GFR SERPL CREATININE-BSD FRML MDRD: 50 ML/MIN/{1.73_M2}
GLUCOSE BLDC GLUCOMTR-MCNC: 152 MG/DL (ref 70–99)
GLUCOSE BLDC GLUCOMTR-MCNC: 167 MG/DL (ref 70–99)
GLUCOSE BLDC GLUCOMTR-MCNC: 168 MG/DL (ref 70–99)
GLUCOSE BLDC GLUCOMTR-MCNC: 249 MG/DL (ref 70–99)
GLUCOSE BLDC GLUCOMTR-MCNC: 301 MG/DL (ref 70–99)
GLUCOSE SERPL-MCNC: 263 MG/DL (ref 70–99)
HCT VFR BLD AUTO: 29.9 % (ref 40–53)
HGB BLD-MCNC: 9.9 G/DL (ref 13.3–17.7)
MCH RBC QN AUTO: 30.9 PG (ref 26.5–33)
MCHC RBC AUTO-ENTMCNC: 33.1 G/DL (ref 31.5–36.5)
MCV RBC AUTO: 93 FL (ref 78–100)
PLATELET # BLD AUTO: 114 10E9/L (ref 150–450)
POTASSIUM SERPL-SCNC: 3.9 MMOL/L (ref 3.4–5.3)
RBC # BLD AUTO: 3.2 10E12/L (ref 4.4–5.9)
SODIUM SERPL-SCNC: 133 MMOL/L (ref 133–144)
WBC # BLD AUTO: 3 10E9/L (ref 4–11)

## 2021-06-22 PROCEDURE — 93005 ELECTROCARDIOGRAM TRACING: CPT

## 2021-06-22 PROCEDURE — 258N000003 HC RX IP 258 OP 636: Performed by: HOSPITALIST

## 2021-06-22 PROCEDURE — 80048 BASIC METABOLIC PNL TOTAL CA: CPT | Performed by: INTERNAL MEDICINE

## 2021-06-22 PROCEDURE — 85730 THROMBOPLASTIN TIME PARTIAL: CPT | Performed by: INTERNAL MEDICINE

## 2021-06-22 PROCEDURE — C1894 INTRO/SHEATH, NON-LASER: HCPCS | Performed by: INTERNAL MEDICINE

## 2021-06-22 PROCEDURE — 250N000013 HC RX MED GY IP 250 OP 250 PS 637: Performed by: PHYSICIAN ASSISTANT

## 2021-06-22 PROCEDURE — 99232 SBSQ HOSP IP/OBS MODERATE 35: CPT | Performed by: HOSPITALIST

## 2021-06-22 PROCEDURE — B2111ZZ FLUOROSCOPY OF MULTIPLE CORONARY ARTERIES USING LOW OSMOLAR CONTRAST: ICD-10-PCS | Performed by: INTERNAL MEDICINE

## 2021-06-22 PROCEDURE — 85730 THROMBOPLASTIN TIME PARTIAL: CPT | Performed by: HOSPITALIST

## 2021-06-22 PROCEDURE — 999N001017 HC STATISTIC GLUCOSE BY METER IP

## 2021-06-22 PROCEDURE — 93455 CORONARY ART/GRFT ANGIO S&I: CPT | Performed by: INTERNAL MEDICINE

## 2021-06-22 PROCEDURE — 210N000002 HC R&B HEART CARE

## 2021-06-22 PROCEDURE — C1769 GUIDE WIRE: HCPCS | Performed by: INTERNAL MEDICINE

## 2021-06-22 PROCEDURE — 272N000001 HC OR GENERAL SUPPLY STERILE: Performed by: INTERNAL MEDICINE

## 2021-06-22 PROCEDURE — 36415 COLL VENOUS BLD VENIPUNCTURE: CPT | Performed by: INTERNAL MEDICINE

## 2021-06-22 PROCEDURE — 250N000011 HC RX IP 250 OP 636: Performed by: INTERNAL MEDICINE

## 2021-06-22 PROCEDURE — 250N000012 HC RX MED GY IP 250 OP 636 PS 637: Performed by: HOSPITALIST

## 2021-06-22 PROCEDURE — 36415 COLL VENOUS BLD VENIPUNCTURE: CPT | Performed by: HOSPITALIST

## 2021-06-22 PROCEDURE — B2131ZZ FLUOROSCOPY OF MULTIPLE CORONARY ARTERY BYPASS GRAFTS USING LOW OSMOLAR CONTRAST: ICD-10-PCS | Performed by: INTERNAL MEDICINE

## 2021-06-22 PROCEDURE — 250N000013 HC RX MED GY IP 250 OP 250 PS 637: Performed by: HOSPITALIST

## 2021-06-22 PROCEDURE — 99153 MOD SED SAME PHYS/QHP EA: CPT | Performed by: INTERNAL MEDICINE

## 2021-06-22 PROCEDURE — 99232 SBSQ HOSP IP/OBS MODERATE 35: CPT | Mod: 25 | Performed by: INTERNAL MEDICINE

## 2021-06-22 PROCEDURE — 85027 COMPLETE CBC AUTOMATED: CPT | Performed by: INTERNAL MEDICINE

## 2021-06-22 PROCEDURE — 250N000009 HC RX 250: Performed by: INTERNAL MEDICINE

## 2021-06-22 PROCEDURE — 99152 MOD SED SAME PHYS/QHP 5/>YRS: CPT | Performed by: INTERNAL MEDICINE

## 2021-06-22 RX ORDER — ACETAMINOPHEN 325 MG/1
650 TABLET ORAL EVERY 4 HOURS PRN
Status: DISCONTINUED | OUTPATIENT
Start: 2021-06-22 | End: 2021-06-28 | Stop reason: HOSPADM

## 2021-06-22 RX ORDER — FENTANYL CITRATE 50 UG/ML
25-50 INJECTION, SOLUTION INTRAMUSCULAR; INTRAVENOUS
Status: DISCONTINUED | OUTPATIENT
Start: 2021-06-22 | End: 2021-06-22

## 2021-06-22 RX ORDER — NALOXONE HYDROCHLORIDE 0.4 MG/ML
0.2 INJECTION, SOLUTION INTRAMUSCULAR; INTRAVENOUS; SUBCUTANEOUS
Status: DISCONTINUED | OUTPATIENT
Start: 2021-06-22 | End: 2021-06-28 | Stop reason: HOSPADM

## 2021-06-22 RX ORDER — FENTANYL CITRATE 50 UG/ML
INJECTION, SOLUTION INTRAMUSCULAR; INTRAVENOUS
Status: DISCONTINUED | OUTPATIENT
Start: 2021-06-22 | End: 2021-06-22 | Stop reason: HOSPADM

## 2021-06-22 RX ORDER — NALOXONE HYDROCHLORIDE 0.4 MG/ML
0.4 INJECTION, SOLUTION INTRAMUSCULAR; INTRAVENOUS; SUBCUTANEOUS
Status: DISCONTINUED | OUTPATIENT
Start: 2021-06-22 | End: 2021-06-28 | Stop reason: HOSPADM

## 2021-06-22 RX ORDER — ATROPINE SULFATE 0.1 MG/ML
0.5 INJECTION INTRAVENOUS
Status: ACTIVE | OUTPATIENT
Start: 2021-06-22 | End: 2021-06-22

## 2021-06-22 RX ORDER — HEPARIN SODIUM 1000 [USP'U]/ML
INJECTION, SOLUTION INTRAVENOUS; SUBCUTANEOUS
Status: DISCONTINUED | OUTPATIENT
Start: 2021-06-22 | End: 2021-06-22 | Stop reason: HOSPADM

## 2021-06-22 RX ORDER — FLUMAZENIL 0.1 MG/ML
0.2 INJECTION, SOLUTION INTRAVENOUS
Status: ACTIVE | OUTPATIENT
Start: 2021-06-22 | End: 2021-06-22

## 2021-06-22 RX ORDER — NITROGLYCERIN 5 MG/ML
VIAL (ML) INTRAVENOUS
Status: DISCONTINUED | OUTPATIENT
Start: 2021-06-22 | End: 2021-06-22 | Stop reason: HOSPADM

## 2021-06-22 RX ORDER — IOPAMIDOL 755 MG/ML
INJECTION, SOLUTION INTRAVASCULAR
Status: DISCONTINUED | OUTPATIENT
Start: 2021-06-22 | End: 2021-06-22 | Stop reason: HOSPADM

## 2021-06-22 RX ORDER — CLOPIDOGREL BISULFATE 75 MG/1
75 TABLET ORAL DAILY
Status: DISCONTINUED | OUTPATIENT
Start: 2021-06-23 | End: 2021-06-22

## 2021-06-22 RX ORDER — VERAPAMIL HYDROCHLORIDE 2.5 MG/ML
INJECTION, SOLUTION INTRAVENOUS
Status: DISCONTINUED | OUTPATIENT
Start: 2021-06-22 | End: 2021-06-22 | Stop reason: HOSPADM

## 2021-06-22 RX ADMIN — FUROSEMIDE 20 MG: 20 TABLET ORAL at 08:58

## 2021-06-22 RX ADMIN — METOPROLOL SUCCINATE 100 MG: 100 TABLET, EXTENDED RELEASE ORAL at 08:59

## 2021-06-22 RX ADMIN — DIGOXIN 125 MCG: 125 TABLET ORAL at 09:00

## 2021-06-22 RX ADMIN — ATORVASTATIN CALCIUM 20 MG: 20 TABLET, FILM COATED ORAL at 09:00

## 2021-06-22 RX ADMIN — ALLOPURINOL 300 MG: 300 TABLET ORAL at 09:00

## 2021-06-22 RX ADMIN — MICONAZOLE NITRATE: 20 POWDER TOPICAL at 21:30

## 2021-06-22 RX ADMIN — POTASSIUM CHLORIDE 20 MEQ: 1500 TABLET, EXTENDED RELEASE ORAL at 09:00

## 2021-06-22 RX ADMIN — SODIUM CHLORIDE: 9 INJECTION, SOLUTION INTRAVENOUS at 10:55

## 2021-06-22 RX ADMIN — MICONAZOLE NITRATE: 20 POWDER TOPICAL at 17:43

## 2021-06-22 RX ADMIN — INSULIN GLARGINE 25 UNITS: 100 INJECTION, SOLUTION SUBCUTANEOUS at 21:27

## 2021-06-22 RX ADMIN — CITALOPRAM HYDROBROMIDE 20 MG: 20 TABLET ORAL at 21:18

## 2021-06-22 RX ADMIN — MICONAZOLE NITRATE: 20 POWDER TOPICAL at 12:16

## 2021-06-22 RX ADMIN — ASPIRIN 325 MG: 325 TABLET, COATED ORAL at 08:58

## 2021-06-22 RX ADMIN — LISINOPRIL 10 MG: 10 TABLET ORAL at 08:59

## 2021-06-22 RX ADMIN — PANTOPRAZOLE SODIUM 40 MG: 40 TABLET, DELAYED RELEASE ORAL at 08:59

## 2021-06-22 ASSESSMENT — MIFFLIN-ST. JEOR: SCORE: 1466.97

## 2021-06-22 NOTE — PLAN OF CARE
Date/Time: June 22, 2021  Reason for Admission:AMS + recurrent falls + generalized weakness    Cognitive/Mentation:x4, forgetful  Neuros/CMS:intact  VS:VSS on RA; B/P: 137/72, T: 97.5, P: 69, R: 0     :continent, uses urinal at bedside  Pain:denies    Tele:NSR  Drips: Heparin @ 1200 units/hour  Skin:R BKA, coccyx rash  Activity:x1 w/ prothesis   Diet:NPO for angiogram today    Discharge:pending. Will discharge home with cardiac event monitor.     End of shift summary:no events overnight. Sugars elevated.

## 2021-06-22 NOTE — PROGRESS NOTES
[facility]ology Progress Note     Primary Oncologist/Hematologist:            Assessment and Plan:       1.  Recent fall, etiology unclear.    - cardiac work up in progress. Event monitor being considered at discharge.      2.  NSTEMI. Troponin 2.3 on admission, 5.5 on recheck, then trending down. He denies recent chest pain or shortness of breath. Echo  shows global  Hypokinesis but EF has declined.   -  Currently on heparin gtt, aspirin, statin, and beta blocker.   - note plans for heart cath later today.       3.  Chronic lymphocytic leukemia, in second partial remission.    --Has been receiving Venetoclax and Rituxan  -- Is due for Venetoclax later this week. Can postpone until discharged from hospital.    -- Dr. England's notes indicate that pt should be taking venetoclax 100 mg daily.  Pt is not sure what he is taking but suggests it might be 100 mg BID.  Will keep at 100 mg daily dosing until I can review with Dr. England  - monitor blood counts serially.  4.  Cardiomyopathy, etiology unclear.We reviewed his recent treatment and explained that venetoclax is not typically associated with cardiomyopathy; therefore, underlying progression of underlying coronary artery disease would be a possible consideration.     5.  Problem diabetes mellitus with poor long-term disease control.         Discussed with niece at bedside.       OFELIA Nix, Nurse Practitioner  [facility]  [phone]          Interval History:     Heart cath planned for today              Review of Systems:     The 5 point Review of Systems is negative other than noted in the HPI                Medications:   Scheduled Medications    allopurinol  300 mg Oral Daily     aspirin  81 mg Oral Daily     aspirin  325 mg Oral Daily     atorvastatin  20 mg Oral Daily     citalopram  20 mg Oral QPM     digoxin  125 mcg Oral Daily     furosemide  20 mg Oral Daily     insulin aspart  1-12 Units Subcutaneous Q4H     insulin  "aspart  6 Units Subcutaneous TID w/meals     [Held by provider] insulin aspart  1-7 Units Subcutaneous TID AC     [Held by provider] insulin aspart  1-5 Units Subcutaneous At Bedtime     insulin glargine  25 Units Subcutaneous At Bedtime     lisinopril  10 mg Oral Daily     metoprolol succinate ER  100 mg Oral Daily     miconazole   Topical TID     pantoprazole  40 mg Oral Daily     sodium chloride (PF)  3 mL Intracatheter Q8H     sodium chloride (PF)  3 mL Intracatheter Q8H     venetoclax  100 mg Oral Daily with lunch     PRN Medications  acetaminophen, acetaminophen, bisacodyl, Continuing ACE inhibitor/ARB/ARNI from home medication list OR ACE inhibitor/ARB order already placed during this visit, - MEDICATION INSTRUCTIONS -, - MEDICATION INSTRUCTIONS -, glucose **OR** dextrose **OR** glucagon, lidocaine 4%, lidocaine (buffered or not buffered), LORazepam **OR** LORazepam, - MEDICATION INSTRUCTIONS -, melatonin, ondansetron **OR** ondansetron, - MEDICATION INSTRUCTIONS -, polyethylene glycol, senna-docusate **OR** senna-docusate, sodium chloride (PF), sodium chloride (PF), sodium chloride (PF), traZODone               Physical Exam:   Vitals were reviewed  Blood pressure (!) 140/70, pulse 67, temperature 97.5  F (36.4  C), temperature source Oral, resp. rate 14, height 1.778 m (5' 10\"), weight 74.1 kg (163 lb 4.8 oz), SpO2 100 %.  Wt Readings from Last 4 Encounters:   06/22/21 74.1 kg (163 lb 4.8 oz)   04/18/21 76.8 kg (169 lb 4.8 oz)   05/18/20 78.6 kg (173 lb 4.8 oz)   02/20/20 78.2 kg (172 lb 6.4 oz)       I/O last 3 completed shifts:  In: 157.5 [I.V.:157.5]  Out: 650 [Urine:650]                 Data:   All laboratory data and imaging studies reviewed.    CMP  Recent Labs   Lab 06/22/21  0532 06/21/21  0522 06/20/21  1610 06/20/21  0707 06/19/21  2237    133  --  134 128*   POTASSIUM 3.9 4.3  --  3.9 4.4   CHLORIDE 105 104  --  102 95   CO2 21 23  --  24 24   ANIONGAP 7 6  --  8 9   * 268*  --  283* " 487*   BUN 36* 33*  --  31* 33*   CR 1.34* 1.32*  --  1.36* 1.40*   GFRESTIMATED 50* 51*  --  49* 48*   GFRESTBLACK 58* 59*  --  57* 55*   JUSTINO 9.2 9.4  --  9.5 10.0   MAG  --  2.1  --   --   --    PHOS  --  3.5 3.3  --   --    PROTTOTAL  --   --   --  6.7* 7.7   ALBUMIN  --   --   --  3.3* 4.0   BILITOTAL  --   --   --  0.9 1.1   ALKPHOS  --   --   --  72 84   AST  --   --   --  34 32   ALT  --   --   --  24 26     CBC  Recent Labs   Lab 06/22/21  0532 06/21/21  0522 06/20/21  0707 06/20/21  0336   WBC 3.0* 3.9* 3.6* 3.7*   RBC 3.20* 3.45* 3.20* 3.14*   HGB 9.9* 10.6* 10.0* 9.9*   HCT 29.9* 32.3* 29.4* 28.7*   MCV 93 94 92 91   MCH 30.9 30.7 31.3 31.5   MCHC 33.1 32.8 34.0 34.5   RDW 14.4 14.6 14.6 14.6   * 136* 116* 131*     INRNo lab results found in last 7 days.        Claribel Brady CNP  Nurse Practitioner  [facility]  [phone]

## 2021-06-22 NOTE — PROGRESS NOTES
Reviewed results of coronary angiogram.  Severe, known three-vessel native coronary artery disease.  Patent LIMA and marginal grafts.  70% lesion in the SVG to diagonal which itself is small and diffusely diseased.  Disease in the diagonal graft does not explain depressed EF and hence an intervention to this vessel is very unlikely to fix the problem.  Furthermore, severe diffuse disease in the native diagonal and poor distal runoff increases the risk of acute closure of stent in case intervention on graft is undertaken. Moreover, the patient is overall asymptomatic besides occasional shortness of breath.  Hence, we will manage this conservatively. Will likely benefit from switching from lisinopril to entresto and referral to EP for ICD if EF does not remove after medication optimization. This can be done on out pt basis when seen in cardiology out pt clinic. No further recommendations.     Cardiology will sign off.     MD Kendall  Cardiology

## 2021-06-22 NOTE — PROGRESS NOTES
[facility]  Cardiology Progress Note    Outpatient cardiologist: Dr. Valdes at [facility]  Date of Service (when I saw the patient): 06/22/2021  Summary: Tad Manning is a 80 year old male with history of coronary artery disease s/p CABG in 1995 with a L-LAD, SVG-OM1, SVG-D1 with subsequent PCI to the mid LAD in 2018, an ischemic cardiomyopathy with LVEF of 35 - 40% in 2018, chronic systolic heart failure, atrial fibrillation maintained on Eliquis, PA s/p RLE BKA, CLL with recent resumption of chemotherapy, hypertension, CKD stage III, uncontrolled DM, vertebral osteomyelitis in 2017 who was admitted on 6/19/2021 with a fall and generalized weakness.   Interval History   Denies shortness of breath or chest pain this morning. He has no questions about the angiogram. BP mildly elevated before his medications this morning.   Assessment & Plan   1.  Fall. Events surrounding falls PTA are unclear. Reportedly he was down for at least 4 hours.   -  Recommend therapy evaluation.  -  Consider event monitor at discharge pending work up here  2.  NSTEMI. Troponin 2.3 on admission, 5.5 on recheck, then trending dowm. He denies recent chest pain or shortness of breath. Echo as above shows global  Hypokinesis but EF has declined.   -  Currently on heparin gtt, aspirin, statin, and beta blocker.   3.  Coronaray artery disease. With remote CABG x 3 in 1995 with a LIMA-LAD, SVG-OM1, SVG-D1. Angiogram in 2018 showed patent grafts but significant obstructive disease of the native LAD after the LIMA anastomosis which was treated with a BHAVNA. He had moderate RCA disease.  -  On Asprin, atorvastatin 20 mg daily prior to admission. LDL well controlled at 49   4.  Ischemic cardiomyopathy. Last echo in 2018 showed some improvement in his LVEF at 35 - 40%. Historically has been closer to 25%.  On Metoprolol  mg daily, lisinopril 5 mg, and digoxin daily prior to admission. NTproBNP 7300 on admission.   -   Repeat echo here shows a decline in his EF compared to 2018 with a LVEF around 20 - 25% and global hypokinesis. RV function is normal. No significant valvular disease.   -  Lisinopril increased to 10 mg daily 6/21.  5.  Chronic systolic congestive heart failure. On lasix 20 mg daily. He appears compensated on exam.   6. CLL. Chemotherapy resumed recently with rituximab and venetoclax. Per oncology, venetoclax is not typically associated with cardiomyopathy.   -  Oncology following.   7. PAF. On Eliquis prior to admission although concern about his compliance with that. This is currently on hold while on a heparin drip.   -  Currently in NSR.   8.  PAD. S/p R BKA in 2018 and prior LE stents and bypass on the L.   9.  DM. Hgba1c elevated at 9.0.  10.  CKD stage III. Creatinine stable between 1.3 - 1.4.   11.  Hypertension.     Plan:  1.  Coronary angiography today given NSTEMI and drop in EF. Risks and benefits were discussed with him yesterday by Dr. Argueta.   2.  Continue IV heparin, aspirin, statin, Metoprolol, lisinopril, and digoxin.  3.  He has BP room to optimize his heart failure therapy. Lisinopril increased yesterday. Would also consider transitioning to Entresto either here or as an outpatient. SGLT-2 inhibitors should also be considered given his history of DM.     Genevieve Cain PA-C    Physical Exam   Temp: 97.6  F (36.4  C) Temp src: Oral BP: (!) 146/75 Pulse: 71   Resp: (!) 0 SpO2: 100 % O2 Device: None (Room air)    Vitals:    06/19/21 2238 06/20/21 0300 06/21/21 0526 06/22/21 0600   Weight: 76.7 kg (169 lb) 69 kg (152 lb 1.6 oz) 70.1 kg (154 lb 8.7 oz) 74.1 kg (163 lb 4.8 oz)     Vital Signs with Ranges  Temp:  [97.5  F (36.4  C)-97.7  F (36.5  C)] 97.6  F (36.4  C)  Pulse:  [69-78] 71  Resp:  [0-18] 0  BP: (132-146)/(69-75) 146/75  SpO2:  [99 %-100 %] 100 %  06/17 0700 - 06/22 0659  In: 1477.3 [P.O.:1040; I.V.:437.3]  Out: 2280 [Urine:2280]  Net: -802.7  Constitutional: NAD.   Respiratory: CTAB.    Cardiovascular: RRR, s1s2, no murmur  GI: soft, BS+  Skin: warm, no rashes  Musculoskeletal: Moving all extremities. No edema. R BKA noted.  Neurologic: Alert, oriented x 3  Neuropsychiatric: Normal affect   Data   Results for orders placed or performed during the hospital encounter of 06/19/21 (from the past 24 hour(s))   Glucose by meter   Result Value Ref Range    Glucose 292 (H) 70 - 99 mg/dL   Glucose by meter   Result Value Ref Range    Glucose 312 (H) 70 - 99 mg/dL   Glucose by meter   Result Value Ref Range    Glucose 283 (H) 70 - 99 mg/dL   Glucose by meter   Result Value Ref Range    Glucose 301 (H) 70 - 99 mg/dL   Partial thromboplastin time   Result Value Ref Range    PTT 54 (H) 22 - 37 sec   Basic metabolic panel   Result Value Ref Range    Sodium 133 133 - 144 mmol/L    Potassium 3.9 3.4 - 5.3 mmol/L    Chloride 105 94 - 109 mmol/L    Carbon Dioxide 21 20 - 32 mmol/L    Anion Gap 7 3 - 14 mmol/L    Glucose 263 (H) 70 - 99 mg/dL    Urea Nitrogen 36 (H) 7 - 30 mg/dL    Creatinine 1.34 (H) 0.66 - 1.25 mg/dL    GFR Estimate 50 (L) >60 mL/min/[1.73_m2]    GFR Estimate If Black 58 (L) >60 mL/min/[1.73_m2]    Calcium 9.2 8.5 - 10.1 mg/dL   CBC with platelets   Result Value Ref Range    WBC 3.0 (L) 4.0 - 11.0 10e9/L    RBC Count 3.20 (L) 4.4 - 5.9 10e12/L    Hemoglobin 9.9 (L) 13.3 - 17.7 g/dL    Hematocrit 29.9 (L) 40.0 - 53.0 %    MCV 93 78 - 100 fl    MCH 30.9 26.5 - 33.0 pg    MCHC 33.1 31.5 - 36.5 g/dL    RDW 14.4 10.0 - 15.0 %    Platelet Count 114 (L) 150 - 450 10e9/L   EKG 12-lead, tracing only   Result Value Ref Range    Interpretation ECG Click View Image link to view waveform and result    Glucose by meter   Result Value Ref Range    Glucose 249 (H) 70 - 99 mg/dL       Echocardiogram   1. Left ventricular systolic function is severely reduced. The visual ejection  fraction is estimated at 20-25%.  2. The right ventricle is normal in structure, function and size.  3. The left atrium is moderately  dilated.  4. The aortic valve is trileaflet with aortic valve sclerosis.  5. There is mild (1+) mitral regurgitation.    Tele NSR    Medications     Continuing ACE inhibitor/ARB/ARNI from home medication list OR ACE inhibitor/ARB order already placed during this visit       - MEDICATION INSTRUCTIONS -       - MEDICATION INSTRUCTIONS -       heparin 1,200 Units/hr (06/22/21 8549)     - MEDICATION INSTRUCTIONS -       - MEDICATION INSTRUCTIONS -       sodium chloride         allopurinol  300 mg Oral Daily     aspirin  81 mg Oral Daily     aspirin  325 mg Oral Daily     atorvastatin  20 mg Oral Daily     citalopram  20 mg Oral QPM     digoxin  125 mcg Oral Daily     furosemide  20 mg Oral Daily     insulin aspart  1-12 Units Subcutaneous Q4H     insulin aspart  6 Units Subcutaneous TID w/meals     [Held by provider] insulin aspart  1-7 Units Subcutaneous TID AC     [Held by provider] insulin aspart  1-5 Units Subcutaneous At Bedtime     insulin glargine  25 Units Subcutaneous At Bedtime     lisinopril  10 mg Oral Daily     metoprolol succinate ER  100 mg Oral Daily     miconazole   Topical TID     pantoprazole  40 mg Oral Daily     sodium chloride (PF)  3 mL Intracatheter Q8H     sodium chloride (PF)  3 mL Intracatheter Q8H     venetoclax  100 mg Oral Daily with lunch

## 2021-06-22 NOTE — PRE-PROCEDURE
GENERAL PRE-PROCEDURE:   Procedure:  Cor terell, graft angio, possible PCI  Date/Time:  6/22/2021 1:44 PM    Written consent obtained?: Yes    Risks and benefits: Risks, benefits and alternatives were discussed    Consent given by:  Patient  Patient states understanding of procedure being performed: Yes    Patient's understanding of procedure matches consent: Yes    Procedure consent matches procedure scheduled: Yes    Appropriately NPO:  Yes  ASA Class:  Class 4- Severe systemic disease, acute unstable problems  Mallampati  :  Grade 1- soft palate, uvula, tonsillar pillars, and posterior pharyngeal wall visible  Lungs:  Other (comment)  Lung exam comment:  Normal respirations  Heart:  A-fib  History & Physical reviewed:  History and physical reviewed and no updates needed  Statement of review:  I have reviewed the lab findings, diagnostic data, medications, and the plan for sedation

## 2021-06-22 NOTE — PLAN OF CARE
See Flowsheets for more specific documentation, lab and imaging results, and vitals.    Shift Summary    Ambulation: Ax2 w/ Walker and Gait Belt pivot to wheelchair. prosthetic  Neuro: No Neuro deficits, A/Ox4 and forgetful  Respiratory: LS clear/diminished/equal bilat, on RA  Cardiac: Telemetry - 1st degree AV block BBB  GI: mod carb - no longer NPO  :  dribbling  Musculoskeletal: right BKA  Skin: Right radial site WDL - TR Band off at 1715    LDAs: 1 PIV saline locked    Vital signs:  Temp: 97.5  F (36.4  C) Temp src: Oral BP: (!) 140/70 Pulse: 67   Resp: 14 SpO2: 100 %  RA    Significant Labs:  167 152  Significant Events: angio today - no intervention - TR Band off patient. Cardio PT to see patient tomorrow  Treatment/Care Plan: pain management, telemetry. Repositioning. Wound care.     Discharge Disposition: TCU most likely 1-2 days.

## 2021-06-22 NOTE — PROGRESS NOTES
[facility]    Hospitalist Progress Note      Assessment & Plan   Tad Manning is a 80 year old male with extensive past medical history that is most notable for CLL, CAD, chronic systolic CHF, PAD status post right lower extremity BKA, Type 2 Diabetes mellitus, and chronic atrial fibrillation on Eliquis, among multiple others; who presents with recurrent falls and generalized weakness and is found to have possible ACS.    Possible ACS and acute metabolic encephalopathy  Ischemic cardiomyopathy--worsening, EF 20-25%  Of note, he has multi-vessel CAD, status post CABG (L-LAD, SVG-OM1, SVG-D1) in 1995, followed more recently by Left Heart catheterization (done pre-operatively) in 3/2018 which reportedly showed moderate disease in his RCA, severe LAD disease and patent grafts: he had two drug eluting stents placed in the proximal and mid LAD. Right sided catheterization done concomitantly showed elevated right and left sided pressures with type II pulmonary hypertension. he also has known ischemic cardiomyopathy causing chronic systolic CHF (Stage C, NYHA 3), most recently having LVEF 35-40% on TTE in 2018. He is followed by Dr. Valdes of [facility]. He presents for falls at home, with reported increased exertional dyspnea and confusion. Troponin is positive in the ED. He could have ACS, though alternatively he could have demand ischemia due to other cause of acute illness such as occult infection. Acute systolic CHF exacerbation is possible, though he appeared hypovolemic in the ED initially and IV fluid was given. Last, we note that he just resumed chemotherapy as below for CLL and this could be playing a role in his weakness. We note that UA and XR are negative for signs of infection there. There are no other evident acute neurologic deficits at this time to suggest stroke. He could have underlying as yet undiagnosed dementia  Trop 2.304-->5.523-->5.095-->3.629.  BNP 7364. Lipid  profile with HDL 38, LDL 49, total cholesterol 116  * 6/20 echocardiogram: EF 20-25%, RV normal, LA moderately dilated. Aortic valve sclerosis. Mild mitral regurgitation   * 6/22: severe known 3 vessel CAD. Does have 70% lesion in SVG, but does not explain depressed EF  -- cardiology consult appreciated now signed off  -- Resumed ASA  -- Digoxin level low at 0.4--there is question of compliance outpatient, resumed PTA dose 6/20   -- continue PTA toprol XL 100mg, lisinopril -->increased to 10mg once daily on 6/21  -----Consideration for entresto--will have pharmacy liaison look into pricing  -- continue PTA lasix 20mg daily  -- continue atorvastatin 20mg daily  -- Fall precautions   -- plan for cardiac event monitor on discharge  --plan for medical optimization and then follow up in cardiology outpatient setting in 1mo     CLL  Followed by [facility], treated with steroids for hemolytic anemia in 2012 and received chemotherapy at that time. More recently in the past year he has been found to have recurrence. In 4/2021 it seems he saw his [facility] Oncology team in follow up and since then has started Venetoclax.  -- oncology appreciated  -- continue PTA venetoclax at 100mg daily and allopurinol 300mg daily     PAD  He is status post a left above-knee popliteal to below-knee popliteal bypass in 2014.  He then underwent a R leg below knee amputation in Oct 2018. He last saw Dr. Vick of Vascular Surgery in 12/2020 (at a virtual visit).   - Noted.     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 Noted.  -- await PT/OT evaluations     Paroxysmal atrial fibrillation  CHADS-VASC noted to be at least 6. He is meant to be on Eliquis but it seems he has been mis-adherent to medications at home.  --  eliquis to resume on 6/23 AM     Hypertension  - resumed PTA lisinopril, increased to 10mg daily, continued toprol XL 100mg daily   (may consider swap to entresto from lisinopril if reasonable cost)    Dyslipidemia  - lipitor resumed at 20mg. Lipid profile with HDL 38, LDL 49, total cholesterol 116     Type 2 Diabetes mellitus  On Lantus, Metformin as an outpatient. His last admission here was 1/2020 for hyperglycemia and STORM and started on insulin at that time. Currently FS near 500. He does not appear to have been consistently taking his medications at home. A1C is 9  -- medium resistance sliding scale insulin  -- 6 units TIDWM novolog  -- 25 units lantus at bedtime 6/22  -- pharmacy liaison checking into SGLT2 inhibitors     Pseudohyponatremia  Corrects to 136. Low sodium related to high BGs on admit     Chronic anemia, thrombocytopenia  Noted, seems near baseline     Stage 3 CKD  Cr 1.4 which seems to be very near his recent previous values.   - Daily BMP     Chronic Stage 2 sacral decubitus ulcers  Present on admission.   - WOC consulted      BPH  By history. Noted.     Depression  PTA Celexa continued   - PTA trazodone resumed at reduced dose, 50mg (instead of 150mg), given falls/confusion    GERD  PTA PPI, not used recently  - resumed     COVID-19 screening-negative    DVT Prophylaxis: heparin gtt  Code Status: Full Code  Expected discharge: 2-3 days pending therapy bishnu Lance, DO  Text Page (7am - 6pm)    Interval History   Patient seen and examined. He denies any complaints today.He is ready for his cardiac cath. He denies feeling short of breath, having chest pain, worsening leg edema. Reports his niece visited early this morning and brought him a back scratcher.    -Data reviewed today: I reviewed all new labs and imaging results over the last 24 hours. I personally reviewed no images or EKG's today.    Physical Exam   Temp: 97.2  F (36.2  C) Temp src: Oral BP: 92/52 Pulse: 56   Resp: 13  SpO2: 99 % O2 Device: None (Room air) Oxygen Delivery: 2 LPM  Vitals:    06/20/21 0300 06/21/21 0526 06/22/21 0600   Weight: 69 kg (152 lb 1.6 oz) 70.1 kg (154 lb 8.7 oz) 74.1 kg (163 lb 4.8 oz)     Vital Signs with Ranges  Temp:  [97.2  F (36.2  C)-97.6  F (36.4  C)] 97.2  F (36.2  C)  Pulse:  [56-78] 56  Resp:  [0-18] 13  BP: ()/(49-75) 92/52  SpO2:  [95 %-100 %] 99 %  I/O last 3 completed shifts:  In: 157.5 [I.V.:157.5]  Out: 1075 [Urine:1075]    Constitutional: Awake, alert, cooperative, no apparent distress  Respiratory: Clear to auscultation bilaterally, no crackles or wheezing  Cardiovascular: Regular rhythm and rate, normal S1 and S2, and no murmur noted  GI: Normal bowel sounds, soft, non-distended, non-tender  Skin/Integumen: No rashes, no cyanosis, no edema. Stage 2 sacral decubitus ulcer noted. Right BKA noted with some abrasions, mild redness (baseline per patient)  Other: Oriented to person, place, situation.    Medications     Continuing ACE inhibitor/ARB/ARNI from home medication list OR ACE inhibitor/ARB order already placed during this visit       - MEDICATION INSTRUCTIONS -       - MEDICATION INSTRUCTIONS -       - MEDICATION INSTRUCTIONS -         allopurinol  300 mg Oral Daily     [START ON 6/23/2021] apixaban ANTICOAGULANT  5 mg Oral BID     aspirin  81 mg Oral Daily     atorvastatin  20 mg Oral Daily     citalopram  20 mg Oral QPM     digoxin  125 mcg Oral Daily     furosemide  20 mg Oral Daily     insulin aspart  6 Units Subcutaneous TID w/meals     insulin aspart  1-7 Units Subcutaneous TID AC     insulin aspart  1-5 Units Subcutaneous At Bedtime     insulin glargine  25 Units Subcutaneous At Bedtime     lisinopril  10 mg Oral Daily     metoprolol succinate ER  100 mg Oral Daily     miconazole   Topical TID     pantoprazole  40 mg Oral Daily     sodium chloride (PF)  3 mL Intracatheter Q8H     venetoclax  100 mg Oral Daily with lunch       Data   Recent Labs   Lab 06/22/21  0563  06/21/21  0522 06/20/21  1209 06/20/21  0707 06/20/21  0336 06/19/21  2237   WBC 3.0* 3.9*  --  3.6* 3.7* 4.8   HGB 9.9* 10.6*  --  10.0* 9.9* 11.2*   MCV 93 94  --  92 91 91   * 136*  --  116* 131* 145*    133  --  134  --  128*   POTASSIUM 3.9 4.3  --  3.9  --  4.4   CHLORIDE 105 104  --  102  --  95   CO2 21 23  --  24  --  24   BUN 36* 33*  --  31*  --  33*   CR 1.34* 1.32*  --  1.36*  --  1.40*   ANIONGAP 7 6  --  8  --  9   JUSTINO 9.2 9.4  --  9.5  --  10.0   * 268*  --  283*  --  487*   ALBUMIN  --   --   --  3.3*  --  4.0   PROTTOTAL  --   --   --  6.7*  --  7.7   BILITOTAL  --   --   --  0.9  --  1.1   ALKPHOS  --   --   --  72  --  84   ALT  --   --   --  24  --  26   AST  --   --   --  34  --  32   TROPI  --   --  3.629* 5.095* 5.523* 2.304*       Recent Results (from the past 24 hour(s))   Cardiac Catheterization    Narrative      Dist LAD lesion is 40% stenosed.    Mid LM to Prox LAD lesion is 90% stenosed.    Mid LAD lesion is 100% stenosed.    Ost Cx to Dist Cx lesion is 100% stenosed.    Prox Graft lesion is 70% stenosed.    1st Diag lesion is 80% stenosed.    Lat 1st Diag lesion is 90% stenosed.     1.  Severe multivessel CAD s/p CABG.   LM has severe stenosis.   LAD has sever proximal stenosis and midLAD is occluded. Distal LAD fills   via patent LIMA.   LCx is occluded.    RCA has moderate diffuse disease.  2.  LIMA to LAD is patent.  Mid to distal LAD stents are patent.  3.  SVG to OM is patent.  4.  SVG to Diagonal is patent with a 70% stenosis and supplies diagonal   branches are small and diffusely diseased.

## 2021-06-23 ENCOUNTER — APPOINTMENT (OUTPATIENT)
Dept: OCCUPATIONAL THERAPY | Facility: CLINIC | Age: 81
DRG: 280 | End: 2021-06-23
Attending: HOSPITALIST
Payer: COMMERCIAL

## 2021-06-23 LAB
ANION GAP SERPL CALCULATED.3IONS-SCNC: 6 MMOL/L (ref 3–14)
BUN SERPL-MCNC: 32 MG/DL (ref 7–30)
CALCIUM SERPL-MCNC: 8.9 MG/DL (ref 8.5–10.1)
CHLORIDE SERPL-SCNC: 106 MMOL/L (ref 94–109)
CO2 SERPL-SCNC: 22 MMOL/L (ref 20–32)
CREAT SERPL-MCNC: 1.59 MG/DL (ref 0.66–1.25)
ERYTHROCYTE [DISTWIDTH] IN BLOOD BY AUTOMATED COUNT: 14.6 % (ref 10–15)
GFR SERPL CREATININE-BSD FRML MDRD: 41 ML/MIN/{1.73_M2}
GLUCOSE BLDC GLUCOMTR-MCNC: 136 MG/DL (ref 70–99)
GLUCOSE BLDC GLUCOMTR-MCNC: 280 MG/DL (ref 70–99)
GLUCOSE BLDC GLUCOMTR-MCNC: 283 MG/DL (ref 70–99)
GLUCOSE BLDC GLUCOMTR-MCNC: 296 MG/DL (ref 70–99)
GLUCOSE BLDC GLUCOMTR-MCNC: 357 MG/DL (ref 70–99)
GLUCOSE SERPL-MCNC: 311 MG/DL (ref 70–99)
HCT VFR BLD AUTO: 28.8 % (ref 40–53)
HGB BLD-MCNC: 9.6 G/DL (ref 13.3–17.7)
MAGNESIUM SERPL-MCNC: 2 MG/DL (ref 1.6–2.3)
MCH RBC QN AUTO: 31.6 PG (ref 26.5–33)
MCHC RBC AUTO-ENTMCNC: 33.3 G/DL (ref 31.5–36.5)
MCV RBC AUTO: 95 FL (ref 78–100)
PHOSPHATE SERPL-MCNC: 3.8 MG/DL (ref 2.5–4.5)
PLATELET # BLD AUTO: 102 10E9/L (ref 150–450)
POTASSIUM SERPL-SCNC: 3.9 MMOL/L (ref 3.4–5.3)
RBC # BLD AUTO: 3.04 10E12/L (ref 4.4–5.9)
SODIUM SERPL-SCNC: 134 MMOL/L (ref 133–144)
WBC # BLD AUTO: 2.9 10E9/L (ref 4–11)

## 2021-06-23 PROCEDURE — 250N000013 HC RX MED GY IP 250 OP 250 PS 637: Performed by: INTERNAL MEDICINE

## 2021-06-23 PROCEDURE — 999N001017 HC STATISTIC GLUCOSE BY METER IP

## 2021-06-23 PROCEDURE — 80048 BASIC METABOLIC PNL TOTAL CA: CPT | Performed by: HOSPITALIST

## 2021-06-23 PROCEDURE — 97535 SELF CARE MNGMENT TRAINING: CPT | Mod: GO

## 2021-06-23 PROCEDURE — 210N000002 HC R&B HEART CARE

## 2021-06-23 PROCEDURE — 250N000012 HC RX MED GY IP 250 OP 636 PS 637: Performed by: HOSPITALIST

## 2021-06-23 PROCEDURE — 36415 COLL VENOUS BLD VENIPUNCTURE: CPT | Performed by: HOSPITALIST

## 2021-06-23 PROCEDURE — 97165 OT EVAL LOW COMPLEX 30 MIN: CPT | Mod: GO

## 2021-06-23 PROCEDURE — 85027 COMPLETE CBC AUTOMATED: CPT | Performed by: HOSPITALIST

## 2021-06-23 PROCEDURE — 250N000013 HC RX MED GY IP 250 OP 250 PS 637: Performed by: HOSPITALIST

## 2021-06-23 PROCEDURE — 99233 SBSQ HOSP IP/OBS HIGH 50: CPT | Performed by: INTERNAL MEDICINE

## 2021-06-23 PROCEDURE — 84100 ASSAY OF PHOSPHORUS: CPT | Performed by: HOSPITALIST

## 2021-06-23 PROCEDURE — 83735 ASSAY OF MAGNESIUM: CPT | Performed by: HOSPITALIST

## 2021-06-23 RX ADMIN — APIXABAN 5 MG: 5 TABLET, FILM COATED ORAL at 09:01

## 2021-06-23 RX ADMIN — PANTOPRAZOLE SODIUM 40 MG: 40 TABLET, DELAYED RELEASE ORAL at 09:00

## 2021-06-23 RX ADMIN — ATORVASTATIN CALCIUM 20 MG: 20 TABLET, FILM COATED ORAL at 09:00

## 2021-06-23 RX ADMIN — MICONAZOLE NITRATE: 20 POWDER TOPICAL at 17:48

## 2021-06-23 RX ADMIN — CITALOPRAM HYDROBROMIDE 20 MG: 20 TABLET ORAL at 21:43

## 2021-06-23 RX ADMIN — ALLOPURINOL 300 MG: 300 TABLET ORAL at 09:00

## 2021-06-23 RX ADMIN — APIXABAN 5 MG: 5 TABLET, FILM COATED ORAL at 21:41

## 2021-06-23 RX ADMIN — INSULIN GLARGINE 25 UNITS: 100 INJECTION, SOLUTION SUBCUTANEOUS at 21:42

## 2021-06-23 RX ADMIN — LISINOPRIL 10 MG: 10 TABLET ORAL at 09:00

## 2021-06-23 RX ADMIN — METOPROLOL SUCCINATE 100 MG: 100 TABLET, EXTENDED RELEASE ORAL at 09:00

## 2021-06-23 RX ADMIN — SENNOSIDES AND DOCUSATE SODIUM 1 TABLET: 8.6; 5 TABLET ORAL at 21:41

## 2021-06-23 RX ADMIN — DIGOXIN 125 MCG: 125 TABLET ORAL at 09:00

## 2021-06-23 RX ADMIN — MICONAZOLE NITRATE: 20 POWDER TOPICAL at 09:02

## 2021-06-23 RX ADMIN — FUROSEMIDE 20 MG: 20 TABLET ORAL at 09:00

## 2021-06-23 RX ADMIN — MICONAZOLE NITRATE: 20 POWDER TOPICAL at 21:44

## 2021-06-23 RX ADMIN — ASPIRIN 81 MG CHEWABLE TABLET 81 MG: 81 TABLET CHEWABLE at 09:00

## 2021-06-23 RX ADMIN — TRAZODONE HYDROCHLORIDE 50 MG: 50 TABLET ORAL at 21:41

## 2021-06-23 ASSESSMENT — ACTIVITIES OF DAILY LIVING (ADL): PREVIOUS_RESPONSIBILITIES: LAUNDRY

## 2021-06-23 ASSESSMENT — MIFFLIN-ST. JEOR: SCORE: 1347.68

## 2021-06-23 NOTE — PLAN OF CARE
A&O x4, forgetful. Pt declined pain medications but repositioning helped. VSS, on RA. Up w/ 1-2 w/ leg, walker and GB to pivot to commode. Frequent urination w/ small harmeet in urinal. Tele: SR. Turn/repo as tolerated. BG fluctuating greatly, sliding scale coverage given. Alarms on. Plan for TCU @ discharge. Continue to monitor.

## 2021-06-23 NOTE — PROGRESS NOTES
[facility]    Hospitalist Progress Note    Assessment & Plan   Tad Manning is a 80 year old male with extensive past medical history that is most notable for CLL, CAD, chronic systolic CHF, PAD status post right lower extremity BKA, Type 2 Diabetes mellitus, and chronic atrial fibrillation on Eliquis, among multiple others; who presents with recurrent falls and generalized weakness and is found to have possible ACS.     Possible ACS and acute metabolic encephalopathy  Ischemic cardiomyopathy--worsening, EF 20-25%  Of note, he has multi-vessel CAD, status post CABG (L-LAD, SVG-OM1, SVG-D1) in 1995, followed more recently by Left Heart catheterization (done pre-operatively) in 3/2018 which reportedly showed moderate disease in his RCA, severe LAD disease and patent grafts: he had two drug eluting stents placed in the proximal and mid LAD. Right sided catheterization done concomitantly showed elevated right and left sided pressures with type II pulmonary hypertension. he also has known ischemic cardiomyopathy causing chronic systolic CHF (Stage C, NYHA 3), most recently having LVEF 35-40% on TTE in 2018. He is followed by Dr. Valdes of [facility]. He presents for falls at home, with reported increased exertional dyspnea and confusion. Troponin is positive in the ED. He could have ACS, though alternatively he could have demand ischemia due to other cause of acute illness such as occult infection. Acute systolic CHF exacerbation is possible, though he appeared hypovolemic in the ED initially and IV fluid was given. Last, we note that he just resumed chemotherapy as below for CLL and this could be playing a role in his weakness. We note that UA and XR are negative for signs of infection there. There are no other evident acute neurologic deficits at this time to suggest stroke. He could have underlying as yet undiagnosed dementia  Trop 2.304-->5.523-->5.095-->3.629.  BNP 7364. Lipid  profile with HDL 38, LDL 49, total cholesterol 116  * 6/20 echocardiogram: EF 20-25%, RV normal, LA moderately dilated. Aortic valve sclerosis. Mild mitral regurgitation   * 6/22: severe known 3 vessel CAD. Does have 70% lesion in SVG, but does not explain depressed EF  -- cardiology consult appreciated now signed off  - wt down to 62kg.   - Cr up to 1.5.   Plan;   -- Resumed ASA  -- Digoxin level low at 0.4--there is question of compliance outpatient, resumed PTA dose 6/20   -- continue PTA toprol XL 100mg, lisinopril -->increased to 10mg once daily on 6/21  -----Consideration for entresto--will have pharmacy liaison look into pricing---> Entresto is $29/month. Will discuss with pt and cards  -- continue PTA lasix 20mg daily  -- continue atorvastatin 20mg daily  -- Fall precautions   -- plan for cardiac event monitor on discharge  --plan for medical optimization and then follow up in cardiology outpatient setting in 1mo  -am bmp,      CLL  Followed by [facility], treated with steroids for hemolytic anemia in 2012 and received chemotherapy at that time. More recently in the past year he has been found to have recurrence. In 4/2021 it seems he saw his [facility] Oncology team in follow up and since then has started Venetoclax. Not related to CM.  -- oncology appreciated  -- continue PTA venetoclax at 100mg daily---> Dr. England/oncology to modify dose to 200 mg daily  -allopurinol 300mg daily  -outpatient follow up  -follow blood counts- recheck tomorrow     PAD  He is status post a left above-knee popliteal to below-knee popliteal bypass in 2014.  He then underwent a R leg below knee amputation in Oct 2018. He last saw Dr. Vick of Vascular Surgery in 12/2020 (at a virtual visit).   - Noted.     Vertebral osteomyelitis  In 2017: causing paraparesis and osteomyelitis with epidural abscess and diskitis due to MRSA, status post surgical decompression with T7 through T12 instrumentation, T9-10 interbody fusion and T7 through T12  posterior arthrodesis and antibiotic treatment. Noted.  -- await PT/OT evaluations     Paroxysmal atrial fibrillation  CHADS-VASC noted to be at least 6. He is meant to be on Eliquis but it seems he has been mis-adherent to medications at home.  -- eliquis to resumed on 6/23 AM     Hypertension  - resumed PTA lisinopril, increased to 10mg daily, continued toprol XL 100mg daily  -good control.   (may consider swap to entresto from lisinopril if reasonable cost)  -cardiology outpt follow up, pcp follow up     Dyslipidemia  - lipitor resumed at 20mg. Lipid profile with HDL 38, LDL 49, total cholesterol 116  -lipids in 6 weeks     Type 2 Diabetes mellitus  On Lantus, Metformin as an outpatient. His last admission here was 1/2020 for hyperglycemia and STORM and started on insulin at that time. Currently FS near 500. He does not appear to have been consistently taking his medications at home. A1C is 9  -- medium resistance sliding scale insulin  -- 6 units TIDWM novolog  -- 25 units lantus at bedtime 6/22, assess control at this new dose, adjust as needed.   -- pharmacy liaison checking into SGLT2 inhibitors     Pseudohyponatremia  Corrects to 136. Low sodium related to high BGs on admit     Chronic anemia, thrombocytopenia  Noted, seems near baseline     Stage 3 CKD  Cr 1.4 which seems to be very near his recent previous values.   - Daily BMP     Chronic Stage 2 sacral decubitus ulcers  Present on admission.   - WOC consulted, seen 6/21.       BPH  By history. Noted.      Depression  PTA Celexa continued   - PTA trazodone resumed at reduced dose, 50mg (instead of 150mg), given falls/confusion     GERD  PTA PPI, not used recently  - resumed     COVID-19 screening-negative     DVT Prophylaxis: heparin gtt  Code Status: Full Code  Expected discharge:1-2 days. Uses w/c. May need tcu. Await, PT, OT in put.   Discussed care plan with pt and RN    Efren Miller MD  Text Page  (7am to 6pm)  Interval History   Doing well. Some  soreness around R BKA  No cp or sob  Yet to see therapy  Uses w/c. Does on meds      -Data reviewed today: I reviewed all new labs and imaging results over the last 24 hours. I personally reviewed imaging and labs last 24 hours    Physical Exam   Temp: 97.6  F (36.4  C) Temp src: Oral BP: (!) 167/76 Pulse: 67   Resp: 28 SpO2: 99 % O2 Device: None (Room air) Oxygen Delivery: 2 LPM  Vitals:    06/21/21 0526 06/22/21 0600 06/23/21 0416   Weight: 70.1 kg (154 lb 8.7 oz) 74.1 kg (163 lb 4.8 oz) 62.1 kg (137 lb)     Vital Signs with Ranges  Temp:  [97.2  F (36.2  C)-98.3  F (36.8  C)] 97.6  F (36.4  C)  Pulse:  [56-71] 67  Resp:  [8-28] 28  BP: ()/(49-81) 167/76  SpO2:  [95 %-100 %] 99 %  I/O last 3 completed shifts:  In: 600 [P.O.:600]  Out: 1125 [Urine:1125]    Constitutional: In bed, nad  Respiratory: CTAB  Cardiovascular: RRR no r/g/m  GI: soft, nt, nd  Skin/Integumen: no LE edema. RBKA  Neuro: nl speech and mentation.   Psych: nl affect       Medications     Continuing ACE inhibitor/ARB/ARNI from home medication list OR ACE inhibitor/ARB order already placed during this visit       - MEDICATION INSTRUCTIONS -       - MEDICATION INSTRUCTIONS -       - MEDICATION INSTRUCTIONS -         allopurinol  300 mg Oral Daily     apixaban ANTICOAGULANT  5 mg Oral BID     aspirin  81 mg Oral Daily     atorvastatin  20 mg Oral Daily     citalopram  20 mg Oral QPM     digoxin  125 mcg Oral Daily     furosemide  20 mg Oral Daily     insulin aspart  6 Units Subcutaneous TID w/meals     insulin aspart  1-7 Units Subcutaneous TID AC     insulin aspart  1-5 Units Subcutaneous At Bedtime     insulin glargine  25 Units Subcutaneous At Bedtime     lisinopril  10 mg Oral Daily     metoprolol succinate ER  100 mg Oral Daily     miconazole   Topical TID     pantoprazole  40 mg Oral Daily     sodium chloride (PF)  3 mL Intracatheter Q8H     venetoclax  100 mg Oral Daily with lunch       Data   Recent Labs   Lab 06/23/21  0535  06/22/21  0532 06/21/21  0522 06/20/21  1209 06/20/21  0707 06/20/21  0336 06/19/21  2237   WBC 2.9* 3.0* 3.9*  --  3.6* 3.7* 4.8   HGB 9.6* 9.9* 10.6*  --  10.0* 9.9* 11.2*   MCV 95 93 94  --  92 91 91   * 114* 136*  --  116* 131* 145*    133 133  --  134  --  128*   POTASSIUM 3.9 3.9 4.3  --  3.9  --  4.4   CHLORIDE 106 105 104  --  102  --  95   CO2 22 21 23  --  24  --  24   BUN 32* 36* 33*  --  31*  --  33*   CR 1.59* 1.34* 1.32*  --  1.36*  --  1.40*   ANIONGAP 6 7 6  --  8  --  9   JUSTINO 8.9 9.2 9.4  --  9.5  --  10.0   * 263* 268*  --  283*  --  487*   ALBUMIN  --   --   --   --  3.3*  --  4.0   PROTTOTAL  --   --   --   --  6.7*  --  7.7   BILITOTAL  --   --   --   --  0.9  --  1.1   ALKPHOS  --   --   --   --  72  --  84   ALT  --   --   --   --  24  --  26   AST  --   --   --   --  34  --  32   TROPI  --   --   --  3.629* 5.095* 5.523* 2.304*       Imaging:   Recent Results (from the past 24 hour(s))   Cardiac Catheterization    Narrative      Dist LAD lesion is 40% stenosed.    Mid LM to Prox LAD lesion is 90% stenosed.    Mid LAD lesion is 100% stenosed.    Ost Cx to Dist Cx lesion is 100% stenosed.    Prox Graft lesion is 70% stenosed.    1st Diag lesion is 80% stenosed.    Lat 1st Diag lesion is 90% stenosed.     1.  Severe multivessel CAD s/p CABG.   LM has severe stenosis.   LAD has sever proximal stenosis and midLAD is occluded. Distal LAD fills   via patent LIMA.   LCx is occluded.    RCA has moderate diffuse disease.  2.  LIMA to LAD is patent.  Mid to distal LAD stents are patent.  3.  SVG to OM is patent.  4.  SVG to Diagonal is patent with a 70% stenosis and supplies diagonal   branches are small and diffusely diseased.

## 2021-06-23 NOTE — PROGRESS NOTES
"   06/23/21 0924   Quick Adds   Type of Visit Initial Occupational Therapy Evaluation   Living Environment   People in home other (see comments)  (\"lady friend\"/ \"roommate\")   Current Living Arrangements apartment   Home Accessibility no concerns;wheelchair accessible   Transportation Anticipated family or friend will provide   Self-Care   Usual Activity Tolerance fair   Current Activity Tolerance poor   Equipment Currently Used at Home walker, rolling;raised toilet seat;grab bar, toilet;grab bar, tub/shower;other (see comments);wheelchair, manual  (4ww, fww, transport chair, manual chair. bedrail. reacher)   Activity/Exercise/Self-Care Comment At baseline is independent in self-cares. At baseline independently completes low pivot transfer to manual wheelchair and uses wheelchair for mobility in his home. RLE prothestic. Reports he no longer ambulates.   Disability/Function   Fall history within last six months yes   Number of times patient has fallen within last six months 2  (shortly before hospitalization)   Change in Functional Status Since Onset of Current Illness/Injury yes   General Information   Onset of Illness/Injury or Date of Surgery 06/19/21   Referring Physician Efren Miller MD   Patient/Family Therapy Goal Statement (OT) Improve strength   Additional Occupational Profile Info/Pertinent History of Current Problem 80 year old male with extensive past medical history that is most notable for CLL, CAD, chronic systolic CHF, PAD status post right lower extremity BKA, Type 2 Diabetes mellitus, and chronic atrial fibrillation on Eliquis, among multiple others; who presents with recurrent falls and generalized weakness and is found to have possible ACS.Found to have NSTEMI.  EF now reduced to 20-25. Had coronary angio without intervention.    Existing Precautions/Restrictions fall;cardiac  (Angio via radial)   Cognitive Status Examination   Orientation Status orientation to person, place and time "   Affect/Mental Status (Cognitive) WFL   Follows Commands follows two-step commands   Memory Deficit minimal deficit;short-term memory   Visual Perception   Visual Impairment/Limitations corrective lenses full-time   Impact of Vision Impairment on Function (Vision) reports he needs new glasses   Sensory   Sensory Comments Baseline neuropath in bilateral hands and left foot   Strength Comprehensive (MMT)   Comment, General Manual Muscle Testing (MMT) Assessment Generalized weakness   Bed Mobility   Bed Mobility supine-sit   Supine-Sit Aldie (Bed Mobility) contact guard   Assistive Device (Bed Mobility) bed rails   Comment (Bed Mobility) has bedrail at ho me   Transfers   Transfers bed-chair transfer;sit-stand transfer   Transfer Skill: Bed to Chair/Chair to Bed   Bed-Chair Aldie (Transfers) minimum assist (75% patient effort)   Sit-Stand Transfer   Sit-Stand Aldie (Transfers) contact guard   Activities of Daily Living   BADL Assessment/Intervention lower body dressing;toileting   Lower Body Dressing Assessment/Training   Aldie Level (Lower Body Dressing) minimum assist (75% patient effort)   Position (Lower Body Dressing) edge of bed sitting;supported standing   Toileting   Aldie Level (Toileting) moderate assist (50% patient effort)   Comment (Toileting) per clinical judgement   Instrumental Activities of Daily Living (IADL)   Previous Responsibilities laundry   IADL Comments meals on wheels. has assist with transportation, cleaning   Clinical Impression   Criteria for Skilled Therapeutic Interventions Met (OT) yes   OT Diagnosis decline function   OT Problem List-Impairments impacting ADL activity tolerance impaired;balance;cognition;mobility;strength;sensory feedback;sensation   Assessment of Occupational Performance 3-5 Performance Deficits   Identified Performance Deficits LB dressing, toileting, bathing, functional mobility   Planned Therapy Interventions (OT) ADL  retraining;balance training;transfer training;home program guidelines;progressive activity/exercise;risk factor education   Clinical Decision Making Complexity (OT) low complexity   Therapy Frequency (OT) Daily   Predicted Duration of Therapy 5 days   Anticipated Equipment Needs Upon Discharge (OT)   (Pt has)   Risk & Benefits of therapy have been explained evaluation/treatment results reviewed;care plan/treatment goals reviewed;risks/benefits reviewed;current/potential barriers reviewed;participants voiced agreement with care plan;participants included;patient   OT Discharge Planning    OT Discharge Recommendation (DC Rec) Transitional Care Facility   OT Rationale for DC Rec Pt requiring assist of 1 to complete mobility and self-cares primarily due to impairments in dynamic balance and activity tolerance. At baseline pt is independent. Recommend continued skilled OT while hospitalized and in TCU to progress pt's safety and independence   Total Evaluation Time (Minutes)   Total Evaluation Time (Minutes) 7

## 2021-06-23 NOTE — CONSULTS
Care Management Initial Consult    General Information  Assessment completed with:  , (hebert Crespo and Mily)  Type of CM/SW Visit: Initial Assessment    Primary Care Provider verified and updated as needed: Yes   Readmission within the last 30 days:        Reason for Consult: discharge planning  Advance Care Planning: Advance Care Planning Reviewed: (no ACP documents)          Communication Assessment  Patient's communication style: spoken language (English or Bilingual)    Hearing Difficulty or Deaf: no   Wear Glasses or Blind: yes    Cognitive  Cognitive/Neuro/Behavioral: .WDL except  Level of Consciousness: (forgetful)  Arousal Level: opens eyes spontaneously  Orientation: oriented x 4  Mood/Behavior: calm, cooperative  Best Language: 0 - No aphasia  Speech: clear    Living Environment:   People in home: alone     Current living Arrangements: apartment      Able to return to prior arrangements: no       Family/Social Support:  Care provided by: self  Provides care for: no one  Marital Status: Single  (hebert)          Description of Support System: Supportive, Involved    Support Assessment: Adequate family and caregiver support, Adequate social supports    Current Resources:   Patient receiving home care services: No     Community Resources: None  Equipment currently used at home: walker, rolling, raised toilet seat, grab bar, toilet, grab bar, tub/shower, wheelchair, manual, other (see comments)(bed rails, reacher)  Supplies currently used at home: None    Employment/Financial:  Employment Status: retired        Financial Concerns: No concerns identified           Lifestyle & Psychosocial Needs:        Socioeconomic History     Marital status: Single     Spouse name: Not on file     Number of children: Not on file     Years of education: Not on file     Highest education level: Not on file     Tobacco Use     Smoking status: Former Smoker     Packs/day: 2.00     Years: 30.00     Pack years: 60.00     Types:  Cigarettes     Quit date: 1995     Years since quittin.0     Smokeless tobacco: Never Used   Substance and Sexual Activity     Alcohol use: No     Drug use: No       Functional Status:  Prior to admission patient needed assistance:              Mental Health Status:          Chemical Dependency Status:                Values/Beliefs:  Spiritual, Cultural Beliefs, Yarsanism Practices, Values that affect care: no               Additional Information:  Per care management/social work consult for discharge planning and likely TCU placement on discharge.  Patient was admitted on 21 withy recurrent falls and generalized weakness.  The tentative date of discharge is 21 or 21.  Reviewed chart and spoke with patient and his niece Cherie, [phone], regarding discharge plans.  Per patient and niece report, patient lives alone in an apartment.  Patient uses a rolling walker ar baseline.  For longer distances patient uses a manual wheelchair.  Patient has a raised toilet seat and grab bars in the bathroom.  Explained that patient has not had therapy as of yet, but I anticipate that he will need TCU placement on discharge and patient and niece are in agreement.  Patient states he has been to [facility] in the past and had a bad experience and does not want to return.  Patient states he went to [facility] in the past and had a good experience.  Patient's niece states that the [place] the [place] of [facility] and they would prefer that patient would prefer that patient goes to a TCU on that side of town.  Cherie has called her sister Mily and she is on speaker phone and after reviewing the Medicare website they are asking for referrals to be [facility] to [facility], and [facility].  Referrals sent, via discharge on the double, to check bed availability.      Will continue to follow.      Nancy Arreola,  CORKY, [facility]    [phone]  [facility]

## 2021-06-23 NOTE — CONSULTS
Coverage check on SGLT2 inhibitors.  Patient has Medicare D through [employer].      Patient is in the catastrophic coverage phase, in which he pays a 5% coinsurance on all covered drugs until 1/1/22.    Farxiga Not covered.   Jardiance $28/mo.   Invokana $27/mo.   Steglatro Not covered.       Additional coverage check on Entresto.    Entresto: $29/mo.      LAY Redmond, Pharmacy Technician/Liaison, Discharge Pharmacy [phone]

## 2021-06-23 NOTE — PLAN OF CARE
A&O x 4, forgetful. VSS. RA. Tele: SR w/1st degree AVB. Assist x 2 for turns/repos. Voiding in urinal. Denies pain. Blood glucose 168/280. Very pleasant. Will continue w/plan of care.

## 2021-06-23 NOTE — PROGRESS NOTES
Care Management Follow Up    Length of Stay (days): 3    Expected Discharge Date: 06/25/21     Concerns to be Addressed: discharge planning     Patient plan of care discussed at interdisciplinary rounds: Yes    Anticipated Discharge Disposition: Transitional Care     Anticipated Discharge Services: Other (see comment)(therapy)  Anticipated Discharge DME: None    Patient/family educated on Medicare website which has current facility and service quality ratings: yes  Education Provided on the Discharge Plan:  yes  Patient/Family in Agreement with the Plan: yes    Referrals Placed by CM/SW: Post Acute Facilities  Private pay costs discussed: Not applicable    Additional Information:  Received a call back from [facility].  They have no available beds.  Received a call back from [facility].  They are unable to accept patient due to the chemo and other spendy meds.  Updated patient and his nieces Mliy and Cherie.  They will follow up with Dr. England's office to inquire about the chemo.  If this is an option we can follow up again with [facility].  Also will follow up with [facility].    Will continue to follow.      CORKY Hobbs, [facility]    [phone]  [facility]

## 2021-06-23 NOTE — PROGRESS NOTES
"Care Management Follow Up    Length of Stay (days): 3    Expected Discharge Date: 06/25/21     Concerns to be Addressed: discharge planning     Patient plan of care discussed at interdisciplinary rounds: Yes    Anticipated Discharge Disposition: Transitional Care     Anticipated Discharge Services: Other (see comment)(therapy)  Anticipated Discharge DME: None          Additional Information:  Writer placed call to [facility] [phone], Writer spoke to Dr. England's Nurse regarding chemo plan. She told writer that is would be placed on hold if patient were to go to TCU. She would speak to Dr. England. \"We encounter this all the time.\"       Mariza Frye RN        "

## 2021-06-23 NOTE — PROGRESS NOTES
"[facility] Hematology Progress Note     Primary Oncologist/Hematologist:            Assessment and Plan:        1.  Recent fall, etiology unclear.    - cardiac work up in progress. Event monitor being considered at discharge.      2.  NSTEMI. Troponin 2.3 on admission, 5.5 on recheck, then trending down. He denies recent chest pain or shortness of breath. Echo  shows global  Hypokinesis but EF has declined.   -  Currently on heparin gtt, aspirin, statin, and beta blocker.   - coronary angiogram 6/23/21 \"Severe, known three-vessel native coronary artery disease.  Patent LIMA and marginal grafts.  70% lesion in the SVG to diagonal which itself is small and diffusely diseased.  Disease in the diagonal graft does not explain depressed EF and hence an intervention to this vessel is very unlikely to fix the problem.  Furthermore, severe diffuse disease in the native diagonal and poor distal runoff increases the risk of acute closure of stent in case intervention on graft is undertaken. Moreover, the patient is overall asymptomatic besides occasional shortness of breath.  Hence, we will manage this conservatively. Will likely benefit from switching from lisinopril to entresto and referral to EP for ICD if EF does not remove after medication optimization. This can be done on out pt basis when seen in cardiology out pt clinic. No further recommendations\"  .    3.  Chronic lymphocytic leukemia, in second partial remission.    --Has been receiving Venetoclax and Rituxan  -- - Venetoclax dose confirmed with Dr. England. Will modify to 200 mg daily  - monitor blood counts serially.    4.  Cardiomyopathy, etiology unclear.We reviewed his recent treatment and explained that venetoclax is not typically associated with cardiomyopathy; therefore, underlying progression of underlying coronary artery disease would be a possible consideration.     5.  Problem diabetes mellitus with poor long-term disease " "control.         Discussed with nieces at bedside. I explained that oral chemos such as  Venetoclax can sometimes be an obstable with TCU placement.       OFELIA Nix, Nurse Practitioner  [facility]  [phone]          Interval History:     Tired today. Eating lunch. No pain              Review of Systems:     The 5 point Review of Systems is negative other than noted in the HPI                Medications:   Scheduled Medications    allopurinol  300 mg Oral Daily     apixaban ANTICOAGULANT  5 mg Oral BID     aspirin  81 mg Oral Daily     atorvastatin  20 mg Oral Daily     citalopram  20 mg Oral QPM     digoxin  125 mcg Oral Daily     furosemide  20 mg Oral Daily     insulin aspart  6 Units Subcutaneous TID w/meals     insulin aspart  1-7 Units Subcutaneous TID AC     insulin aspart  1-5 Units Subcutaneous At Bedtime     insulin glargine  25 Units Subcutaneous At Bedtime     lisinopril  10 mg Oral Daily     metoprolol succinate ER  100 mg Oral Daily     miconazole   Topical TID     pantoprazole  40 mg Oral Daily     sodium chloride (PF)  3 mL Intracatheter Q8H     venetoclax  100 mg Oral Daily with lunch     PRN Medications  acetaminophen, acetaminophen, bisacodyl, Continuing ACE inhibitor/ARB/ARNI from home medication list OR ACE inhibitor/ARB order already placed during this visit, - MEDICATION INSTRUCTIONS -, - MEDICATION INSTRUCTIONS -, glucose **OR** dextrose **OR** glucagon, HOLD MEDICATION, - MEDICATION INSTRUCTIONS -, melatonin, midazolam, naloxone **OR** naloxone **OR** naloxone **OR** naloxone, ondansetron **OR** ondansetron, polyethylene glycol, senna-docusate **OR** senna-docusate, sodium chloride (PF), traZODone               Physical Exam:   Vitals were reviewed  Blood pressure 111/58, pulse 66, temperature 97.6  F (36.4  C), temperature source Oral, resp. rate 22, height 1.778 m (5' 10\"), weight 62.1 kg (137 lb), SpO2 90 %.  Wt Readings from Last 4 Encounters:   06/23/21 62.1 kg " (137 lb)   04/18/21 76.8 kg (169 lb 4.8 oz)   05/18/20 78.6 kg (173 lb 4.8 oz)   02/20/20 78.2 kg (172 lb 6.4 oz)       I/O last 3 completed shifts:  In: 600 [P.O.:600]  Out: 1125 [Urine:1125]                 Data:   All laboratory data and imaging studies reviewed.    CMP  Recent Labs   Lab 06/23/21  0535 06/22/21  0532 06/21/21  0522 06/20/21  1610 06/20/21  0707 06/19/21  2237    133 133  --  134 128*   POTASSIUM 3.9 3.9 4.3  --  3.9 4.4   CHLORIDE 106 105 104  --  102 95   CO2 22 21 23  --  24 24   ANIONGAP 6 7 6  --  8 9   * 263* 268*  --  283* 487*   BUN 32* 36* 33*  --  31* 33*   CR 1.59* 1.34* 1.32*  --  1.36* 1.40*   GFRESTIMATED 41* 50* 51*  --  49* 48*   GFRESTBLACK 47* 58* 59*  --  57* 55*   JUSTINO 8.9 9.2 9.4  --  9.5 10.0   MAG 2.0  --  2.1  --   --   --    PHOS 3.8  --  3.5 3.3  --   --    PROTTOTAL  --   --   --   --  6.7* 7.7   ALBUMIN  --   --   --   --  3.3* 4.0   BILITOTAL  --   --   --   --  0.9 1.1   ALKPHOS  --   --   --   --  72 84   AST  --   --   --   --  34 32   ALT  --   --   --   --  24 26     CBC  Recent Labs   Lab 06/23/21  0535 06/22/21  0532 06/21/21  0522 06/20/21  0707   WBC 2.9* 3.0* 3.9* 3.6*   RBC 3.04* 3.20* 3.45* 3.20*   HGB 9.6* 9.9* 10.6* 10.0*   HCT 28.8* 29.9* 32.3* 29.4*   MCV 95 93 94 92   MCH 31.6 30.9 30.7 31.3   MCHC 33.3 33.1 32.8 34.0   RDW 14.6 14.4 14.6 14.6   * 114* 136* 116*     INRNo lab results found in last 7 days.        Claribel Brady CNP  Nurse Practitioner  [facility]  [phone]

## 2021-06-23 NOTE — PLAN OF CARE
PT: Orders received and chart reviewed. Per discussion with OT, patient is currently requiring CGA for transfers and will benefit from continued skilled PT intervention at TCU. Will defer PT to TCU, OT to continue to address transfers while IP.

## 2021-06-24 ENCOUNTER — APPOINTMENT (OUTPATIENT)
Dept: OCCUPATIONAL THERAPY | Facility: CLINIC | Age: 81
DRG: 280 | End: 2021-06-24
Payer: COMMERCIAL

## 2021-06-24 LAB
ANION GAP SERPL CALCULATED.3IONS-SCNC: 5 MMOL/L (ref 3–14)
BASOPHILS # BLD AUTO: 0 10E9/L (ref 0–0.2)
BASOPHILS NFR BLD AUTO: 0 %
BUN SERPL-MCNC: 33 MG/DL (ref 7–30)
CALCIUM SERPL-MCNC: 9.1 MG/DL (ref 8.5–10.1)
CHLORIDE SERPL-SCNC: 108 MMOL/L (ref 94–109)
CO2 SERPL-SCNC: 24 MMOL/L (ref 20–32)
CREAT SERPL-MCNC: 1.4 MG/DL (ref 0.66–1.25)
DIFFERENTIAL METHOD BLD: ABNORMAL
EOSINOPHIL # BLD AUTO: 0 10E9/L (ref 0–0.7)
EOSINOPHIL NFR BLD AUTO: 0 %
ERYTHROCYTE [DISTWIDTH] IN BLOOD BY AUTOMATED COUNT: 14.6 % (ref 10–15)
GFR SERPL CREATININE-BSD FRML MDRD: 48 ML/MIN/{1.73_M2}
GLUCOSE BLDC GLUCOMTR-MCNC: 247 MG/DL (ref 70–99)
GLUCOSE BLDC GLUCOMTR-MCNC: 276 MG/DL (ref 70–99)
GLUCOSE BLDC GLUCOMTR-MCNC: 288 MG/DL (ref 70–99)
GLUCOSE BLDC GLUCOMTR-MCNC: 320 MG/DL (ref 70–99)
GLUCOSE BLDC GLUCOMTR-MCNC: 372 MG/DL (ref 70–99)
GLUCOSE SERPL-MCNC: 234 MG/DL (ref 70–99)
HCT VFR BLD AUTO: 28.2 % (ref 40–53)
HGB BLD-MCNC: 9.2 G/DL (ref 13.3–17.7)
IMM GRANULOCYTES # BLD: 0 10E9/L (ref 0–0.4)
IMM GRANULOCYTES NFR BLD: 1 %
INTERPRETATION ECG - MUSE: NORMAL
LYMPHOCYTES # BLD AUTO: 0.9 10E9/L (ref 0.8–5.3)
LYMPHOCYTES NFR BLD AUTO: 29.4 %
MCH RBC QN AUTO: 30.7 PG (ref 26.5–33)
MCHC RBC AUTO-ENTMCNC: 32.6 G/DL (ref 31.5–36.5)
MCV RBC AUTO: 94 FL (ref 78–100)
MONOCYTES # BLD AUTO: 0.6 10E9/L (ref 0–1.3)
MONOCYTES NFR BLD AUTO: 17.6 %
NEUTROPHILS # BLD AUTO: 1.6 10E9/L (ref 1.6–8.3)
NEUTROPHILS NFR BLD AUTO: 52 %
NRBC # BLD AUTO: 0 10*3/UL
NRBC BLD AUTO-RTO: 0 /100
PLATELET # BLD AUTO: 102 10E9/L (ref 150–450)
POTASSIUM SERPL-SCNC: 3.8 MMOL/L (ref 3.4–5.3)
RBC # BLD AUTO: 3 10E12/L (ref 4.4–5.9)
SODIUM SERPL-SCNC: 137 MMOL/L (ref 133–144)
WBC # BLD AUTO: 3.1 10E9/L (ref 4–11)

## 2021-06-24 PROCEDURE — 999N001017 HC STATISTIC GLUCOSE BY METER IP

## 2021-06-24 PROCEDURE — 250N000013 HC RX MED GY IP 250 OP 250 PS 637: Performed by: HOSPITALIST

## 2021-06-24 PROCEDURE — 36415 COLL VENOUS BLD VENIPUNCTURE: CPT | Performed by: INTERNAL MEDICINE

## 2021-06-24 PROCEDURE — 80048 BASIC METABOLIC PNL TOTAL CA: CPT | Performed by: INTERNAL MEDICINE

## 2021-06-24 PROCEDURE — 99233 SBSQ HOSP IP/OBS HIGH 50: CPT | Performed by: INTERNAL MEDICINE

## 2021-06-24 PROCEDURE — 97530 THERAPEUTIC ACTIVITIES: CPT | Mod: GO | Performed by: OCCUPATIONAL THERAPIST

## 2021-06-24 PROCEDURE — 250N000013 HC RX MED GY IP 250 OP 250 PS 637: Performed by: INTERNAL MEDICINE

## 2021-06-24 PROCEDURE — 250N000012 HC RX MED GY IP 250 OP 636 PS 637: Performed by: HOSPITALIST

## 2021-06-24 PROCEDURE — 85025 COMPLETE CBC W/AUTO DIFF WBC: CPT | Performed by: INTERNAL MEDICINE

## 2021-06-24 PROCEDURE — 210N000002 HC R&B HEART CARE

## 2021-06-24 RX ADMIN — METOPROLOL SUCCINATE 100 MG: 100 TABLET, EXTENDED RELEASE ORAL at 08:39

## 2021-06-24 RX ADMIN — DIGOXIN 125 MCG: 125 TABLET ORAL at 08:39

## 2021-06-24 RX ADMIN — PANTOPRAZOLE SODIUM 40 MG: 40 TABLET, DELAYED RELEASE ORAL at 08:39

## 2021-06-24 RX ADMIN — LISINOPRIL 10 MG: 10 TABLET ORAL at 08:39

## 2021-06-24 RX ADMIN — ATORVASTATIN CALCIUM 20 MG: 20 TABLET, FILM COATED ORAL at 08:39

## 2021-06-24 RX ADMIN — SENNOSIDES AND DOCUSATE SODIUM 2 TABLET: 8.6; 5 TABLET ORAL at 21:17

## 2021-06-24 RX ADMIN — FUROSEMIDE 20 MG: 20 TABLET ORAL at 08:39

## 2021-06-24 RX ADMIN — SENNOSIDES AND DOCUSATE SODIUM 1 TABLET: 8.6; 5 TABLET ORAL at 08:39

## 2021-06-24 RX ADMIN — ALLOPURINOL 300 MG: 300 TABLET ORAL at 08:39

## 2021-06-24 RX ADMIN — INSULIN GLARGINE 25 UNITS: 100 INJECTION, SOLUTION SUBCUTANEOUS at 21:17

## 2021-06-24 RX ADMIN — APIXABAN 5 MG: 5 TABLET, FILM COATED ORAL at 21:16

## 2021-06-24 RX ADMIN — ASPIRIN 81 MG CHEWABLE TABLET 81 MG: 81 TABLET CHEWABLE at 08:39

## 2021-06-24 RX ADMIN — APIXABAN 5 MG: 5 TABLET, FILM COATED ORAL at 08:39

## 2021-06-24 RX ADMIN — CITALOPRAM HYDROBROMIDE 20 MG: 20 TABLET ORAL at 21:16

## 2021-06-24 NOTE — PROGRESS NOTES
[facility]    Hospitalist Progress Note    Assessment & Plan   Tad Manning is a 80 year old male with extensive past medical history that is most notable for CLL, CAD, chronic systolic CHF, PAD status post right lower extremity BKA, Type 2 Diabetes mellitus, and chronic atrial fibrillation on Eliquis, among multiple others; who presents with recurrent falls and generalized weakness and is found to have possible ACS.     Possible ACS and acute metabolic encephalopathy  Ischemic cardiomyopathy--worsening, EF 20-25%  Of note, he has multi-vessel CAD, status post CABG (L-LAD, SVG-OM1, SVG-D1) in 1995, followed more recently by Left Heart catheterization (done pre-operatively) in 3/2018 which reportedly showed moderate disease in his RCA, severe LAD disease and patent grafts: he had two drug eluting stents placed in the proximal and mid LAD. Right sided catheterization done concomitantly showed elevated right and left sided pressures with type II pulmonary hypertension. he also has known ischemic cardiomyopathy causing chronic systolic CHF (Stage C, NYHA 3), most recently having LVEF 35-40% on TTE in 2018. He is followed by Dr. Valdes of [facility]. He presents for falls at home, with reported increased exertional dyspnea and confusion. Troponin is positive in the ED. He could have ACS, though alternatively he could have demand ischemia due to other cause of acute illness such as occult infection. Acute systolic CHF exacerbation is possible, though he appeared hypovolemic in the ED initially and IV fluid was given. Last, we note that he just resumed chemotherapy as below for CLL and this could be playing a role in his weakness. We note that UA and XR are negative for signs of infection there. There are no other evident acute neurologic deficits at this time to suggest stroke. He could have underlying as yet undiagnosed dementia  Trop 2.304-->5.523-->5.095-->3.629.  BNP 7364. Lipid  profile with HDL 38, LDL 49, total cholesterol 116  * 6/20 echocardiogram: EF 20-25%, RV normal, LA moderately dilated. Aortic valve sclerosis. Mild mitral regurgitation   * 6/22: severe known 3 vessel CAD. Does have 70% lesion in SVG, but does not explain depressed EF  -- cardiology consult appreciated now signed off  - wt down to 62kg.   - Cr up to 1.5.   -doing well. Cr stable at 1.4 today.   -will start entresto 24-26mg tab one bid for now 48 hours after last ace inh dose. Discussed with cardiology  Plan;   -- Resumed ASA  -- Digoxin level low at 0.4--there is question of compliance outpatient, resumed PTA dose 6/20   -- continue PTA toprol XL 100mg, lisinopril -->increased to 10mg once daily on 6/21  ---start entresto 24-26mg tab one bid 48 hours after last ace inh dose on 6/24. Discussed with cardiology    -- continue PTA lasix 20mg daily  -- continue atorvastatin 20mg daily  -- Fall precautions   -- plan for cardiac event monitor on discharge  --plan for medical optimization and then follow up in cardiology outpatient setting in 1 week per cardiology  -am bmp,      CLL  Followed by [facility], treated with steroids for hemolytic anemia in 2012 and received chemotherapy at that time. More recently in the past year he has been found to have recurrence. In 4/2021 it seems he saw his [facility] team in follow up and since then has started Venetoclax. Not related to CM.  -- oncology appreciated  -- continue PTA venetoclax at 100mg daily---> Dr. England/oncology to modify dose to 200 mg daily  -allopurinol 300mg daily  -outpatient follow up  -follow blood counts- recheck tomorrow     PAD  He is status post a left above-knee popliteal to below-knee popliteal bypass in 2014.  He then underwent a R leg below knee amputation in Oct 2018. He last saw Dr. Vick of Vascular Surgery in 12/2020 (at a virtual visit).   - Noted.     Vertebral osteomyelitis  In 2017: causing paraparesis and osteomyelitis with epidural abscess and  diskitis due to MRSA, status post surgical decompression with T7 through T12 instrumentation, T9-10 interbody fusion and T7 through T12 posterior arthrodesis and antibiotic treatment. Noted.  -- await PT/OT evaluations     Paroxysmal atrial fibrillation  CHADS-VASC noted to be at least 6. He is meant to be on Eliquis but it seems he has been mis-adherent to medications at home.  -- eliquis to resumed on 6/23 AM     Hypertension  - resumed PTA lisinopril, increased to 10mg daily, continued toprol XL 100mg daily  -good control.   (may consider swap to entresto from lisinopril if reasonable cost)  -cardiology outpt follow up, pcp follow up     Dyslipidemia  - lipitor resumed at 20mg. Lipid profile with HDL 38, LDL 49, total cholesterol 116  -lipids in 6 weeks     Type 2 Diabetes mellitus  On Lantus, Metformin as an outpatient. His last admission here was 1/2020 for hyperglycemia and STORM and started on insulin at that time. Currently FS near 500. He does not appear to have been consistently taking his medications at home. A1C is 9  -- medium resistance sliding scale insulin  -- 6 units TIDWM novolog  -- 25 units lantus at bedtime 6/22, assess control at this new dose, adjust as needed. May need to increase  -- pharmacy liaison checking into SGLT2 inhibitors- Jardiance $28/month, invokana $27/ month. steglatro not covered, Farxiga not covered     Pseudohyponatremia  Corrects to 136. Low sodium related to high BGs on admit     Chronic anemia, thrombocytopenia  Noted, seems near baseline     Stage 3 CKD  Cr 1.4 which seems to be very near his recent previous values.   Cr stable at 1.4 on ace inh  - Daily BMP     Chronic Stage 2 sacral decubitus ulcers  Present on admission.   - WOC consulted, seen 6/21.       BPH  By history. Noted.      Depression  PTA Celexa continued   - PTA trazodone resumed at reduced dose, 50mg (instead of 150mg), given falls/confusion     GERD  PTA PPI, not used recently  - resumed     COVID-19  screening-negative     DVT Prophylaxis: heparin gtt  Code Status: Full Code  Expected discharge:TCU once bed available. SW consulted    Efren Miller MD  Text Page  (7am to 6pm)  Interval History   Doing well. No cp or sob  No rn concerns      -Data reviewed today: I reviewed all new labs and imaging results over the last 24 hours. I personally reviewed imaging and labs last 24 hours    Physical Exam   Temp: 97.5  F (36.4  C) Temp src: Oral BP: 136/72 Pulse: 61   Resp: 15 SpO2: 100 % O2 Device: None (Room air)    Vitals:    06/21/21 0526 06/22/21 0600 06/23/21 0416   Weight: 70.1 kg (154 lb 8.7 oz) 74.1 kg (163 lb 4.8 oz) 62.1 kg (137 lb)     Vital Signs with Ranges  Temp:  [97.4  F (36.3  C)-98  F (36.7  C)] 97.5  F (36.4  C)  Pulse:  [60-66] 61  Resp:  [14-22] 15  BP: (111-148)/(58-72) 136/72  SpO2:  [90 %-100 %] 100 %  I/O last 3 completed shifts:  In: 740 [P.O.:740]  Out: 1325 [Urine:1325]    Constitutional: In bed, nad  Respiratory: CTAB  Cardiovascular: RRR no r/g/m  GI: soft, nt, nd  Skin/Integumen: no LE edema. RBKA  Neuro: nl speech and mentation.   Psych: nl affect       Medications     Continuing ACE inhibitor/ARB/ARNI from home medication list OR ACE inhibitor/ARB order already placed during this visit       - MEDICATION INSTRUCTIONS -       - MEDICATION INSTRUCTIONS -       - MEDICATION INSTRUCTIONS -         allopurinol  300 mg Oral Daily     apixaban ANTICOAGULANT  5 mg Oral BID     aspirin  81 mg Oral Daily     atorvastatin  20 mg Oral Daily     citalopram  20 mg Oral QPM     digoxin  125 mcg Oral Daily     furosemide  20 mg Oral Daily     insulin aspart  6 Units Subcutaneous Daily with breakfast     insulin aspart  8 Units Subcutaneous Daily with lunch     insulin aspart  8 Units Subcutaneous Daily with supper     insulin aspart  1-7 Units Subcutaneous TID AC     insulin aspart  1-5 Units Subcutaneous At Bedtime     insulin glargine  25 Units Subcutaneous At Bedtime     metoprolol succinate ER   100 mg Oral Daily     miconazole   Topical TID     pantoprazole  40 mg Oral Daily     [START ON 6/26/2021] sacubitril-valsartan  1 tablet Oral BID     sodium chloride (PF)  3 mL Intracatheter Q8H     venetoclax  200 mg Oral Daily with lunch       Data   Recent Labs   Lab 06/24/21  0535 06/23/21  0535 06/22/21  0532 06/20/21  1209 06/20/21  1209 06/20/21  0707 06/20/21  0336 06/19/21  2237   WBC 3.1* 2.9* 3.0*   < >  --  3.6* 3.7* 4.8   HGB 9.2* 9.6* 9.9*   < >  --  10.0* 9.9* 11.2*   MCV 94 95 93   < >  --  92 91 91   * 102* 114*   < >  --  116* 131* 145*    134 133   < >  --  134  --  128*   POTASSIUM 3.8 3.9 3.9   < >  --  3.9  --  4.4   CHLORIDE 108 106 105   < >  --  102  --  95   CO2 24 22 21   < >  --  24  --  24   BUN 33* 32* 36*   < >  --  31*  --  33*   CR 1.40* 1.59* 1.34*   < >  --  1.36*  --  1.40*   ANIONGAP 5 6 7   < >  --  8  --  9   JUSTINO 9.1 8.9 9.2   < >  --  9.5  --  10.0   * 311* 263*   < >  --  283*  --  487*   ALBUMIN  --   --   --   --   --  3.3*  --  4.0   PROTTOTAL  --   --   --   --   --  6.7*  --  7.7   BILITOTAL  --   --   --   --   --  0.9  --  1.1   ALKPHOS  --   --   --   --   --  72  --  84   ALT  --   --   --   --   --  24  --  26   AST  --   --   --   --   --  34  --  32   TROPI  --   --   --   --  3.629* 5.095* 5.523* 2.304*    < > = values in this interval not displayed.       Imaging:   No results found for this or any previous visit (from the past 24 hour(s)).

## 2021-06-24 NOTE — PLAN OF CARE
A&O x4, forgetful. Pt denied pain. VSS, on RA. Up w/ 1+ gb+ walker+leg. Tele: SR 1st degree AVB and BBB. T/R as requested. Urinating small amounts frequently.  Alarms on. Plan for TCU placement hopefully tomorrow. Continue to monitor.

## 2021-06-24 NOTE — PLAN OF CARE
Patient alert, assist with turns. A2 with gait belt walker to pivot. VSS Tele SR BBB. BG elevated, 357,276, 234. Denies pain SOB SW working on TCU placement.

## 2021-06-24 NOTE — PROGRESS NOTES
"    Progress Note     Primary Oncologist/Hematologist:  Dr. England          Assessment and Plan:New actions/orders today (06/24/2021) are underlined.     Recent fall  - Etiology unclear  - Cardiac work up in progress. Event monitor being considered at discharge.      NSTEMI  - Troponin 2.3 on admission, 5.5 on recheck, then trending down.   - He denies recent chest pain or shortness of breath.   - Echocardiogram 6/20 shows EF 20-25% with severe global hypokinesia  - Coronary angiogram 6/23/21 \"Severe, known three-vessel native coronary artery disease.  Patent LIMA and marginal grafts.  70% lesion in the SVG to diagonal which itself is small and diffusely diseased.  Disease in the diagonal graft does not explain depressed EF and hence an intervention to this vessel is very unlikely to fix the problem.  Furthermore, severe diffuse disease in the native diagonal and poor distal runoff increases the risk of acute closure of stent in case intervention on graft is undertaken. Moreover, the patient is overall asymptomatic besides occasional shortness of breath.  Hence, we will manage this conservatively. Will likely benefit from switching from lisinopril to entresto and referral to EP for ICD if EF does not remove after medication optimization. This can be done on out pt basis when seen in cardiology out pt clinic. No further recommendations\"  - None of the above symptoms are typically seen as a side effect of venetoclax  - Medical management is planned  - Now on estresto  - Currently on apixaban, aspirin, statin, digoxin, diuretic and beta blocker.     Chronic lymphocytic leukemia, in second partial remission.    - Has been receiving Venetoclax and Rituxan  - Overall blood counts are stable  - Venetoclax 200 mg daily can continue. This can be held at TCU if needed, but if allowed since he has the prescription it would be okay to continue.  - Monitor blood counts serially  - Was due for rituximab 6/24 and this can be held " for now  - Continue [facility] follow up after TCU to resume treatment.     Type 2 diabetes mellitus   - Poor long-term disease control  - We are not likely to achieve excellent control given his comorbidities     Jam GILBERT, CNP  [facility]  [phone] (office), [phone] (cell)        Interval History:     Feeling okay today. Hopeful to get to TCU soon. No new concerns.               Review of Systems:     The 5 point Review of Systems is negative other than noted in the HPI             Medications:   Scheduled Medications    allopurinol  300 mg Oral Daily     apixaban ANTICOAGULANT  5 mg Oral BID     aspirin  81 mg Oral Daily     atorvastatin  20 mg Oral Daily     citalopram  20 mg Oral QPM     digoxin  125 mcg Oral Daily     furosemide  20 mg Oral Daily     insulin aspart  6 Units Subcutaneous Daily with breakfast     insulin aspart  8 Units Subcutaneous Daily with lunch     insulin aspart  8 Units Subcutaneous Daily with supper     insulin aspart  1-7 Units Subcutaneous TID AC     insulin aspart  1-5 Units Subcutaneous At Bedtime     insulin glargine  25 Units Subcutaneous At Bedtime     metoprolol succinate ER  100 mg Oral Daily     pantoprazole  40 mg Oral Daily     [START ON 6/26/2021] sacubitril-valsartan  1 tablet Oral BID     sodium chloride (PF)  3 mL Intracatheter Q8H     venetoclax  200 mg Oral Daily with lunch     PRN Medications  acetaminophen, acetaminophen, bisacodyl, Continuing ACE inhibitor/ARB/ARNI from home medication list OR ACE inhibitor/ARB order already placed during this visit, - MEDICATION INSTRUCTIONS -, - MEDICATION INSTRUCTIONS -, glucose **OR** dextrose **OR** glucagon, HOLD MEDICATION, - MEDICATION INSTRUCTIONS -, melatonin, midazolam, naloxone **OR** naloxone **OR** naloxone **OR** naloxone, ondansetron **OR** ondansetron, polyethylene glycol, senna-docusate **OR** senna-docusate, sodium chloride (PF), traZODone               Physical Exam:   Vitals were  "reviewed  Blood pressure 125/64, pulse 61, temperature 97.5  F (36.4  C), temperature source Oral, resp. rate 15, height 1.778 m (5' 10\"), weight 62.1 kg (137 lb), SpO2 99 %.  Wt Readings from Last 4 Encounters:   06/23/21 62.1 kg (137 lb)   04/18/21 76.8 kg (169 lb 4.8 oz)   05/18/20 78.6 kg (173 lb 4.8 oz)   02/20/20 78.2 kg (172 lb 6.4 oz)       I/O last 3 completed shifts:  In: 750 [P.O.:750]  Out: 1225 [Urine:1225]    Constitutional: Awake, alert, cooperative, no apparent distress            Data:   All laboratory data and imaging studies reviewed.    CMP  Recent Labs   Lab 06/24/21  0535 06/23/21  0535 06/22/21  0532 06/21/21  0522 06/20/21  1610 06/20/21  0707 06/19/21  2237    134 133 133  --  134 128*   POTASSIUM 3.8 3.9 3.9 4.3  --  3.9 4.4   CHLORIDE 108 106 105 104  --  102 95   CO2 24 22 21 23  --  24 24   ANIONGAP 5 6 7 6  --  8 9   * 311* 263* 268*  --  283* 487*   BUN 33* 32* 36* 33*  --  31* 33*   CR 1.40* 1.59* 1.34* 1.32*  --  1.36* 1.40*   GFRESTIMATED 48* 41* 50* 51*  --  49* 48*   GFRESTBLACK 55* 47* 58* 59*  --  57* 55*   JUSTINO 9.1 8.9 9.2 9.4  --  9.5 10.0   MAG  --  2.0  --  2.1  --   --   --    PHOS  --  3.8  --  3.5 3.3  --   --    PROTTOTAL  --   --   --   --   --  6.7* 7.7   ALBUMIN  --   --   --   --   --  3.3* 4.0   BILITOTAL  --   --   --   --   --  0.9 1.1   ALKPHOS  --   --   --   --   --  72 84   AST  --   --   --   --   --  34 32   ALT  --   --   --   --   --  24 26     CBC  Recent Labs   Lab 06/24/21  0535 06/23/21  0535 06/22/21  0532 06/21/21  0522   WBC 3.1* 2.9* 3.0* 3.9*   RBC 3.00* 3.04* 3.20* 3.45*   HGB 9.2* 9.6* 9.9* 10.6*   HCT 28.2* 28.8* 29.9* 32.3*   MCV 94 95 93 94   MCH 30.7 31.6 30.9 30.7   MCHC 32.6 33.3 33.1 32.8   RDW 14.6 14.6 14.4 14.6   * 102* 114* 136*                   "

## 2021-06-25 ENCOUNTER — APPOINTMENT (OUTPATIENT)
Dept: OCCUPATIONAL THERAPY | Facility: CLINIC | Age: 81
DRG: 280 | End: 2021-06-25
Payer: COMMERCIAL

## 2021-06-25 LAB
GLUCOSE BLDC GLUCOMTR-MCNC: 152 MG/DL (ref 70–99)
GLUCOSE BLDC GLUCOMTR-MCNC: 181 MG/DL (ref 70–99)
GLUCOSE BLDC GLUCOMTR-MCNC: 182 MG/DL (ref 70–99)
GLUCOSE BLDC GLUCOMTR-MCNC: 252 MG/DL (ref 70–99)
GLUCOSE BLDC GLUCOMTR-MCNC: 296 MG/DL (ref 70–99)

## 2021-06-25 PROCEDURE — 97110 THERAPEUTIC EXERCISES: CPT | Mod: GO

## 2021-06-25 PROCEDURE — 250N000013 HC RX MED GY IP 250 OP 250 PS 637: Performed by: HOSPITALIST

## 2021-06-25 PROCEDURE — 250N000013 HC RX MED GY IP 250 OP 250 PS 637: Performed by: INTERNAL MEDICINE

## 2021-06-25 PROCEDURE — 99233 SBSQ HOSP IP/OBS HIGH 50: CPT | Performed by: INTERNAL MEDICINE

## 2021-06-25 PROCEDURE — 250N000012 HC RX MED GY IP 250 OP 636 PS 637: Performed by: HOSPITALIST

## 2021-06-25 PROCEDURE — 210N000002 HC R&B HEART CARE

## 2021-06-25 PROCEDURE — 97535 SELF CARE MNGMENT TRAINING: CPT | Mod: GO

## 2021-06-25 PROCEDURE — 999N001017 HC STATISTIC GLUCOSE BY METER IP

## 2021-06-25 RX ORDER — FERROUS SULFATE 324(65)MG
324 TABLET, DELAYED RELEASE (ENTERIC COATED) ORAL DAILY
DISCHARGE
Start: 2021-06-25 | End: 2021-08-26

## 2021-06-25 RX ORDER — LANOLIN ALCOHOL/MO/W.PET/CERES
3 CREAM (GRAM) TOPICAL EVERY EVENING
DISCHARGE
Start: 2021-06-25 | End: 2021-09-22

## 2021-06-25 RX ORDER — TRAZODONE HYDROCHLORIDE 50 MG/1
50 TABLET, FILM COATED ORAL
DISCHARGE
Start: 2021-06-25 | End: 2021-09-22

## 2021-06-25 RX ADMIN — APIXABAN 5 MG: 5 TABLET, FILM COATED ORAL at 20:13

## 2021-06-25 RX ADMIN — CITALOPRAM HYDROBROMIDE 20 MG: 20 TABLET ORAL at 20:13

## 2021-06-25 RX ADMIN — ATORVASTATIN CALCIUM 20 MG: 20 TABLET, FILM COATED ORAL at 08:35

## 2021-06-25 RX ADMIN — PANTOPRAZOLE SODIUM 40 MG: 40 TABLET, DELAYED RELEASE ORAL at 08:35

## 2021-06-25 RX ADMIN — ASPIRIN 81 MG CHEWABLE TABLET 81 MG: 81 TABLET CHEWABLE at 08:35

## 2021-06-25 RX ADMIN — APIXABAN 5 MG: 5 TABLET, FILM COATED ORAL at 08:35

## 2021-06-25 RX ADMIN — METOPROLOL SUCCINATE 100 MG: 100 TABLET, EXTENDED RELEASE ORAL at 08:35

## 2021-06-25 RX ADMIN — ALLOPURINOL 300 MG: 300 TABLET ORAL at 08:35

## 2021-06-25 RX ADMIN — FUROSEMIDE 20 MG: 20 TABLET ORAL at 08:35

## 2021-06-25 RX ADMIN — DIGOXIN 125 MCG: 125 TABLET ORAL at 08:35

## 2021-06-25 RX ADMIN — INSULIN GLARGINE 25 UNITS: 100 INJECTION, SOLUTION SUBCUTANEOUS at 21:56

## 2021-06-25 ASSESSMENT — ACTIVITIES OF DAILY LIVING (ADL)
ADLS_ACUITY_SCORE: 15
ADLS_ACUITY_SCORE: 15
ADLS_ACUITY_SCORE: 14
ADLS_ACUITY_SCORE: 15

## 2021-06-25 ASSESSMENT — MIFFLIN-ST. JEOR: SCORE: 1437.49

## 2021-06-25 NOTE — PROGRESS NOTES
Care Management Follow Up    Length of Stay (days): 5    Expected Discharge Date: 06/25/21(TCU)     Concerns to be Addressed: discharge planning     Patient plan of care discussed at interdisciplinary rounds: Yes    Anticipated Discharge Disposition: Transitional Care     Anticipated Discharge Services: Other (see comment)(therapy)  Anticipated Discharge DME: None    Patient/family educated on Medicare website which has current facility and service quality ratings: yes  Education Provided on the Discharge Plan:    Patient/Family in Agreement with the Plan: yes    Referrals Placed by CM/SW: Post Acute Facilities  Private pay costs discussed: Not applicable    Additional Information:  TYSON inquired with TCU's whether any can accept pt with chemo meds on hold for duration of TCU, or for pt to bring meds in from outside.  Awaiting responses from TCU's.    TYSON made additional calls to TCU's, and none have accepted pt for TCU.  TYSON spoke to osman and updated that we have not yet found an accepting TCU.  Osman states that there will be a family member available all weekend to assist if needed.   ADDENDUM: Tyson spoke to osman, and she provided additional TCU referrals, sent via DOD.      Gini Borrego, [facility] CenterSW

## 2021-06-25 NOTE — PLAN OF CARE
Alert and oriented X 4, up with A1 with GB/WW. SB-SR on tele. Denies pain, SOB. Up in chair for meals. Coccyx pink/red, T&R maintained + education on pressure relief provided. Update given to osman re: TCU placement. Tolerating diet. Continue to monitor.

## 2021-06-25 NOTE — PLAN OF CARE
A&Ox 4. VSS on RA. Up A1 with GB, walker, and leg. Denies pain. MOD CHO diet. Using urinal at bedside. . Tele SB with BBB. PIV SL. Blanchable redness on coccyx, open to air and repositioning. Pending TCU placement.

## 2021-06-25 NOTE — PROGRESS NOTES
"    Progress Note     Primary Oncologist/Hematologist:  Dr. England          Assessment and Plan:New actions/orders today (06/25/2021) are underlined.        Recent fall  - Etiology unclear  - Cardiac work up in progress. Event monitor being considered at discharge.      NSTEMI  - Troponin 2.3 on admission, 5.5 on recheck, then trending down.   - He denies recent chest pain or shortness of breath.   - Echocardiogram 6/20 shows EF 20-25% with severe global hypokinesia  - Coronary angiogram 6/23/21 \"Severe, known three-vessel native coronary artery disease.  Patent LIMA and marginal grafts.  70% lesion in the SVG to diagonal which itself is small and diffusely diseased.  Disease in the diagonal graft does not explain depressed EF and hence an intervention to this vessel is very unlikely to fix the problem.  Furthermore, severe diffuse disease in the native diagonal and poor distal runoff increases the risk of acute closure of stent in case intervention on graft is undertaken. Moreover, the patient is overall asymptomatic besides occasional shortness of breath.  Hence, we will manage this conservatively. Will likely benefit from switching from lisinopril to entresto and referral to EP for ICD if EF does not remove after medication optimization. This can be done on out pt basis when seen in cardiology out pt clinic. No further recommendations\"  - None of the above symptoms are typically seen as a side effect of venetoclax  - Medical management is planned  - Now on estresto  - Currently on apixaban, aspirin, statin, digoxin, diuretic and beta blocker.      Chronic lymphocytic leukemia, in second partial remission.    - Has been receiving Venetoclax and Rituxan  - Overall blood counts are stable  - Venetoclax 200 mg daily can continue. This can be held at TCU if needed, but if allowed since he has the prescription it would be okay to continue.  - Monitor blood counts serially  - Was due for rituximab 6/24 and this can be " held for now  - Continue [facility] follow up after TCU to resume treatment. Tentative orders for 7/8, but that can be changed depending TCU stay.     Type 2 diabetes mellitus   - Poor long-term disease control  - We are not likely to achieve excellent control given his comorbidities     Jam GILBERT, CNP  [facility]  [phone] (office), [phone] (cell)        Interval History:     Feeling okay. No new concerns. We talked about being fluid with hospital follow up and resuming treatment.               Review of Systems:     The 5 point Review of Systems is negative other than noted in the HPI             Medications:   Scheduled Medications    allopurinol  300 mg Oral Daily     apixaban ANTICOAGULANT  5 mg Oral BID     aspirin  81 mg Oral Daily     atorvastatin  20 mg Oral Daily     citalopram  20 mg Oral QPM     digoxin  125 mcg Oral Daily     furosemide  20 mg Oral Daily     insulin aspart  9 Units Subcutaneous Daily with breakfast     insulin aspart  11 Units Subcutaneous Daily with lunch     insulin aspart  11 Units Subcutaneous Daily with supper     insulin aspart  1-7 Units Subcutaneous TID AC     insulin aspart  1-5 Units Subcutaneous At Bedtime     insulin glargine  25 Units Subcutaneous At Bedtime     metoprolol succinate ER  100 mg Oral Daily     pantoprazole  40 mg Oral Daily     [START ON 6/26/2021] sacubitril-valsartan  1 tablet Oral BID     sodium chloride (PF)  3 mL Intracatheter Q8H     venetoclax  200 mg Oral Daily with lunch     PRN Medications  acetaminophen, acetaminophen, bisacodyl, Continuing ACE inhibitor/ARB/ARNI from home medication list OR ACE inhibitor/ARB order already placed during this visit, - MEDICATION INSTRUCTIONS -, - MEDICATION INSTRUCTIONS -, glucose **OR** dextrose **OR** glucagon, HOLD MEDICATION, - MEDICATION INSTRUCTIONS -, melatonin, midazolam, naloxone **OR** naloxone **OR** naloxone **OR** naloxone, ondansetron **OR** ondansetron, polyethylene glycol,  "senna-docusate **OR** senna-docusate, sodium chloride (PF), traZODone               Physical Exam:   Vitals were reviewed  Blood pressure 125/63, pulse 64, temperature 98  F (36.7  C), temperature source Oral, resp. rate 16, height 1.778 m (5' 10\"), weight 71.1 kg (156 lb 12.8 oz), SpO2 99 %.  Wt Readings from Last 4 Encounters:   06/25/21 71.1 kg (156 lb 12.8 oz)   04/18/21 76.8 kg (169 lb 4.8 oz)   05/18/20 78.6 kg (173 lb 4.8 oz)   02/20/20 78.2 kg (172 lb 6.4 oz)       I/O last 3 completed shifts:  In: 490 [P.O.:490]  Out: 1625 [Urine:1625]    Constitutional: Awake, alert, cooperative, no apparent distress            Data:   All laboratory data and imaging studies reviewed.    CMP  Recent Labs   Lab 06/24/21  0535 06/23/21  0535 06/22/21  0532 06/21/21  0522 06/20/21  1610 06/20/21  0707 06/19/21  2237    134 133 133  --  134 128*   POTASSIUM 3.8 3.9 3.9 4.3  --  3.9 4.4   CHLORIDE 108 106 105 104  --  102 95   CO2 24 22 21 23  --  24 24   ANIONGAP 5 6 7 6  --  8 9   * 311* 263* 268*  --  283* 487*   BUN 33* 32* 36* 33*  --  31* 33*   CR 1.40* 1.59* 1.34* 1.32*  --  1.36* 1.40*   GFRESTIMATED 48* 41* 50* 51*  --  49* 48*   GFRESTBLACK 55* 47* 58* 59*  --  57* 55*   JUSTINO 9.1 8.9 9.2 9.4  --  9.5 10.0   MAG  --  2.0  --  2.1  --   --   --    PHOS  --  3.8  --  3.5 3.3  --   --    PROTTOTAL  --   --   --   --   --  6.7* 7.7   ALBUMIN  --   --   --   --   --  3.3* 4.0   BILITOTAL  --   --   --   --   --  0.9 1.1   ALKPHOS  --   --   --   --   --  72 84   AST  --   --   --   --   --  34 32   ALT  --   --   --   --   --  24 26     CBC  Recent Labs   Lab 06/24/21  0535 06/23/21  0535 06/22/21  0532 06/21/21  0522   WBC 3.1* 2.9* 3.0* 3.9*   RBC 3.00* 3.04* 3.20* 3.45*   HGB 9.2* 9.6* 9.9* 10.6*   HCT 28.2* 28.8* 29.9* 32.3*   MCV 94 95 93 94   MCH 30.7 31.6 30.9 30.7   MCHC 32.6 33.3 33.1 32.8   RDW 14.6 14.6 14.4 14.6   * 102* 114* 136*                   "

## 2021-06-25 NOTE — PROVIDER NOTIFICATION
MD Notification    Notified Person: MD    Notified Person Name:Dr. Miller    Notification Date/Time: 6/25/21 @ 1744    Notification Interaction: Text page    Purpose of Notification: Per request.  FYI    Orders Received:    Comments:

## 2021-06-25 NOTE — PROGRESS NOTES
"CLINICAL NUTRITION SERVICES  -  ASSESSMENT NOTE      Recommendations Ordered by Registered Dietitian (RD):   Snacks/supplements PRN    Future/Additional Recommendations:   Continue diet as ordered  Consider scheduled supplements if pt does not discharge    Malnutrition:   % Weight Loss:  > 10% in 6 months (severe malnutrition)  % Intake:  No decreased intake noted  Subcutaneous Fat Loss:  Orbital region moderate depletion and Upper arm region moderate depletion  Muscle Loss:  Temporal region mild depletion, Clavicle bone region moderate depletion, Acromion bone region moderate depletion and Dorsal hand region moderate depletion  Fluid Retention:  None noted    Malnutrition Diagnosis: Severe malnutrition  In Context of:  Chronic illness or disease       REASON FOR ASSESSMENT  Tad Manning is a 80 year old male seen by Registered Dietitian for LOS    Per chart review, PMH significant for CLL, CAD, CHF, T2DM, right leg BKA, stage 3 CKD, and chronic a fib. Presented with recurrent falls and generalized weakness, found to have possible ACS.       NUTRITION HISTORY  - Information obtained from patient and nieces at bedside   - Pt follows a regular diet at home  - Typical food/fluid intake: 3 meals daily, pt reports having a good appetite and no concerns related to eating PTA  - Cooking/shopping: Lives with his \"lady friend\" who does most of the cooking and shopping. Also receives Meals on Wheels 3x/week.   - Food allergies: NKFA  - Chewing/swallowing: No difficulties noted       CURRENT NUTRITION ORDERS  Diet Order:     Moderate Consistent Carbohydrate     Current Intake/Tolerance:  Flowsheets indicate % meal intakes over admission. Ordering 3 meals/day.       NUTRITION FOCUSED PHYSICAL ASSESSMENT FOR DIAGNOSING MALNUTRITION)  Yes          Observed:    Muscle wasting (refer to documentation in Malnutrition section) and Subcutaneous fat loss (refer to documentation in Malnutrition section)    Obtained from " "Chart/Interdisciplinary Team:  - Per WOCN on 6/21: Moist pink to red, nonblanchable rash  consistent with brief placement from groin to sacrum including bilateral buttock, all POA. Scattered scabs to bilateral IT and linear scratch to coccyx. Patient reports sitting in urine and stool soiled brief frequently at home, and damaged skin area is consistent with poor perineal hygiene.   - Last BM on 6/22 (receiving senna)  - 6/20 ECHO: EF 20-25%  - Potential discharge to TCU today     ANTHROPOMETRICS  Height: 5' 10\"  Weight: 156 lbs 12.8 oz  Body mass index is 22.5 kg/m .  Weight Status:  Normal BMI  IBW: 156 lb (71 kg) -- adjusted for right BKA  % IBW: 100%  Weight History: Admission wt was 169 lb on 6/19, suspect some wt loss is related to fluid shifts as noted pt has been receiving diuretics over admission. Suspect wt of 62.1 kg on 6/23 is inaccurate. Nieces state that pt weighed 175 lb in October 2020 and 165 lb in March 2021 which they believe was a dry wt for him. Given this, pt appears to have lost 13 lb over the past 3 months (~8% wt loss) and 19 lb over (~11% wt loss) the past 8-9 months.   Wt Readings from Last 10 Encounters:   06/25/21 71.1 kg (156 lb 12.8 oz)   04/18/21 76.8 kg (169 lb 4.8 oz)   05/18/20 78.6 kg (173 lb 4.8 oz)   02/20/20 78.2 kg (172 lb 6.4 oz)   01/13/20 72.8 kg (160 lb 6.4 oz)   01/09/20 76.2 kg (167 lb 14.4 oz)   08/12/19 77.6 kg (171 lb)   06/20/19 83.9 kg (185 lb)   04/01/19 84.3 kg (185 lb 12.8 oz)   03/14/19 82.1 kg (181 lb)       LABS  Labs reviewed  Recent Labs   Lab 06/25/21  0718 06/25/21  0217 06/24/21  2109 06/24/21  1655 06/24/21  1244 06/24/21  0823 06/24/21  0535 06/23/21  0535 06/23/21  0535 06/22/21  0532 06/22/21  0532 06/21/21  0522 06/21/21  0522 06/20/21  0707 06/20/21  0707 06/19/21 2237 06/19/21 2237   GLC  --   --   --   --   --   --  234*  --  311*  --  263*  --  268*  --  283*  --  487*   * 296* 372* 288* 320* 247*  --    < >  --    < >  --    < >  --    < " >  --    < >  --     < > = values in this interval not displayed.       MEDICATIONS  Medications reviewed  - Lasix   - Novolo units with breakfast, 11 units with lunch and dinner   - Medium resistance sliding scale insulin for correction       ASSESSED NUTRITION NEEDS PER APPROVED PRACTICE GUIDELINES:    Dosing Weight 69 kg (lowest documented wt this adm on )  Estimated Energy Needs: 0087-9755 kcals (25-30 Kcal/Kg)  Justification: maintenance  Estimated Protein Needs:  grams protein (1.2-1.5 g pro/Kg)  Justification: CKD, increased needs with chemotherapy and CHF  Estimated Fluid Needs: per MD pending fluid status       NUTRITION DIAGNOSIS:  Unintended weight loss related to unknown etiology, potentially decreased appetite/PO intake as evidenced by 11% wt loss in the past 8-9 months and moderate fat and muscle wasting       NUTRITION INTERVENTIONS  Recommendations / Nutrition Prescription  Continue diet as ordered  Snacks/supplements PRN   Consider scheduled supplements if pt does not discharge       Implementation  Nutrition education: Provided education on adequate protein intake for preservation of muscle mass. Encouraged continuing meals TID and use of snacks/supplements PRN  Medical Food Supplement      Nutrition Goals  Weight not to fall below current wt of 156 lb   Pt to consume % of meals TID or the equivalent with supplements       MONITORING AND EVALUATION:  Progress towards goals will be monitored and evaluated per protocol and Practice Guidelines      Nai Sherman RD

## 2021-06-25 NOTE — PROGRESS NOTES
A/OX4. Forgetful. Calm and pleasant. LS diminished. On RA. SR w/ BBB. BP WNL. Voiding. No BM this shift. Mod carb diet. A/1 for mobility. Refuses cares/activities, but agreeable with turning in bed. Awaiting for TCU placement. Will continue to monitor.

## 2021-06-26 LAB
ANION GAP SERPL CALCULATED.3IONS-SCNC: 4 MMOL/L (ref 3–14)
BUN SERPL-MCNC: 35 MG/DL (ref 7–30)
CALCIUM SERPL-MCNC: 9.4 MG/DL (ref 8.5–10.1)
CHLORIDE SERPL-SCNC: 108 MMOL/L (ref 94–109)
CO2 SERPL-SCNC: 25 MMOL/L (ref 20–32)
CREAT SERPL-MCNC: 1.26 MG/DL (ref 0.66–1.25)
ERYTHROCYTE [DISTWIDTH] IN BLOOD BY AUTOMATED COUNT: 14.6 % (ref 10–15)
GFR SERPL CREATININE-BSD FRML MDRD: 54 ML/MIN/{1.73_M2}
GLUCOSE BLDC GLUCOMTR-MCNC: 116 MG/DL (ref 70–99)
GLUCOSE BLDC GLUCOMTR-MCNC: 187 MG/DL (ref 70–99)
GLUCOSE BLDC GLUCOMTR-MCNC: 188 MG/DL (ref 70–99)
GLUCOSE BLDC GLUCOMTR-MCNC: 317 MG/DL (ref 70–99)
GLUCOSE SERPL-MCNC: 173 MG/DL (ref 70–99)
HCT VFR BLD AUTO: 28.6 % (ref 40–53)
HGB BLD-MCNC: 9.6 G/DL (ref 13.3–17.7)
MCH RBC QN AUTO: 31.1 PG (ref 26.5–33)
MCHC RBC AUTO-ENTMCNC: 33.6 G/DL (ref 31.5–36.5)
MCV RBC AUTO: 93 FL (ref 78–100)
PLATELET # BLD AUTO: 136 10E9/L (ref 150–450)
POTASSIUM SERPL-SCNC: 4.1 MMOL/L (ref 3.4–5.3)
RBC # BLD AUTO: 3.09 10E12/L (ref 4.4–5.9)
SODIUM SERPL-SCNC: 137 MMOL/L (ref 133–144)
WBC # BLD AUTO: 3.7 10E9/L (ref 4–11)

## 2021-06-26 PROCEDURE — 250N000013 HC RX MED GY IP 250 OP 250 PS 637: Performed by: INTERNAL MEDICINE

## 2021-06-26 PROCEDURE — 80048 BASIC METABOLIC PNL TOTAL CA: CPT | Performed by: INTERNAL MEDICINE

## 2021-06-26 PROCEDURE — 250N000013 HC RX MED GY IP 250 OP 250 PS 637: Performed by: HOSPITALIST

## 2021-06-26 PROCEDURE — 36415 COLL VENOUS BLD VENIPUNCTURE: CPT | Performed by: HOSPITALIST

## 2021-06-26 PROCEDURE — 250N000012 HC RX MED GY IP 250 OP 636 PS 637: Performed by: HOSPITALIST

## 2021-06-26 PROCEDURE — 99233 SBSQ HOSP IP/OBS HIGH 50: CPT | Performed by: INTERNAL MEDICINE

## 2021-06-26 PROCEDURE — 210N000002 HC R&B HEART CARE

## 2021-06-26 PROCEDURE — 36415 COLL VENOUS BLD VENIPUNCTURE: CPT | Performed by: INTERNAL MEDICINE

## 2021-06-26 PROCEDURE — 999N001017 HC STATISTIC GLUCOSE BY METER IP

## 2021-06-26 PROCEDURE — 85027 COMPLETE CBC AUTOMATED: CPT | Performed by: HOSPITALIST

## 2021-06-26 RX ADMIN — ASPIRIN 81 MG CHEWABLE TABLET 81 MG: 81 TABLET CHEWABLE at 08:56

## 2021-06-26 RX ADMIN — CITALOPRAM HYDROBROMIDE 20 MG: 20 TABLET ORAL at 20:35

## 2021-06-26 RX ADMIN — PANTOPRAZOLE SODIUM 40 MG: 40 TABLET, DELAYED RELEASE ORAL at 08:56

## 2021-06-26 RX ADMIN — ATORVASTATIN CALCIUM 20 MG: 20 TABLET, FILM COATED ORAL at 08:56

## 2021-06-26 RX ADMIN — APIXABAN 5 MG: 5 TABLET, FILM COATED ORAL at 20:35

## 2021-06-26 RX ADMIN — SACUBITRIL AND VALSARTAN 1 TABLET: 24; 26 TABLET, FILM COATED ORAL at 20:35

## 2021-06-26 RX ADMIN — ALLOPURINOL 300 MG: 300 TABLET ORAL at 08:56

## 2021-06-26 RX ADMIN — INSULIN GLARGINE 25 UNITS: 100 INJECTION, SOLUTION SUBCUTANEOUS at 21:50

## 2021-06-26 RX ADMIN — METOPROLOL SUCCINATE 100 MG: 100 TABLET, EXTENDED RELEASE ORAL at 08:56

## 2021-06-26 RX ADMIN — FUROSEMIDE 20 MG: 20 TABLET ORAL at 08:56

## 2021-06-26 RX ADMIN — APIXABAN 5 MG: 5 TABLET, FILM COATED ORAL at 08:56

## 2021-06-26 RX ADMIN — DIGOXIN 125 MCG: 125 TABLET ORAL at 08:56

## 2021-06-26 ASSESSMENT — ACTIVITIES OF DAILY LIVING (ADL)
ADLS_ACUITY_SCORE: 14

## 2021-06-26 ASSESSMENT — MIFFLIN-ST. JEOR: SCORE: 1421.16

## 2021-06-26 NOTE — PLAN OF CARE
DATE & TIME: 6.25.21 [id number]    Cognitive Concerns/ Orientation : AO4   BEHAVIOR & AGGRESSION TOOL COLOR: Green  ABNL VS/O2: VSS on RA  MOBILITY: A1 GBW. Right BKA  PAIN MANAGMENT: Denies  DIET: Mod carb  BOWEL/BLADDER: incontinent  ABNL LAB/BG: WBC 3.7: HBG 9.6:   DRAIN/DEVICES: PIV SL  TELEMETRY RHYTHM: SR w. 1 degree AVB + BBB  SKIN: non desire redness on coccyx. mepilex applied.   TESTS/PROCEDURES: NA  D/C DAY/GOALS/PLACE:  Pending

## 2021-06-26 NOTE — PLAN OF CARE
Continuing current plan of care. Patient vitally stable throughout shift. Planning to discharge to a TCU when available. Will continue to monitor and assess patient.

## 2021-06-26 NOTE — PROGRESS NOTES
Hematology Chart Check:    Patient with CLL hospitalized for recurrent falls, generalized weakness, and possible ACS.    From a CLL standpoint, reasonable to continue venetoclax at current dosage.  If he goes to a TCU, this could be held while at TCU.    No new recommendations.    Avinash Rocha M.D.  [facility]  [phone]

## 2021-06-26 NOTE — PROGRESS NOTES
[facility]    Hospitalist Progress Note    Assessment & Plan   Tad Manning is a 80 year old male with extensive past medical history that is most notable for CLL, CAD, chronic systolic CHF, PAD status post right lower extremity BKA, Type 2 Diabetes mellitus, and chronic atrial fibrillation on Eliquis, among multiple others; who presents with recurrent falls and generalized weakness and is found to have possible ACS.     Possible ACS and acute metabolic encephalopathy  Ischemic cardiomyopathy--worsening, EF 20-25%  Of note, he has multi-vessel CAD, status post CABG (L-LAD, SVG-OM1, SVG-D1) in 1995, followed more recently by Left Heart catheterization (done pre-operatively) in 3/2018 which reportedly showed moderate disease in his RCA, severe LAD disease and patent grafts: he had two drug eluting stents placed in the proximal and mid LAD. Right sided catheterization done concomitantly showed elevated right and left sided pressures with type II pulmonary hypertension. he also has known ischemic cardiomyopathy causing chronic systolic CHF (Stage C, NYHA 3), most recently having LVEF 35-40% on TTE in 2018. He is followed by Dr. Valdes of [facility]. He presents for falls at home, with reported increased exertional dyspnea and confusion. Troponin is positive in the ED. He could have ACS, though alternatively he could have demand ischemia due to other cause of acute illness such as occult infection. Acute systolic CHF exacerbation is possible, though he appeared hypovolemic in the ED initially and IV fluid was given. Last, we note that he just resumed chemotherapy as below for CLL and this could be playing a role in his weakness. We note that UA and XR are negative for signs of infection there. There are no other evident acute neurologic deficits at this time to suggest stroke. He could have underlying as yet undiagnosed dementia  Trop 2.304-->5.523-->5.095-->3.629.  BNP 7364. Lipid  profile with HDL 38, LDL 49, total cholesterol 116  * 6/20 echocardiogram: EF 20-25%, RV normal, LA moderately dilated. Aortic valve sclerosis. Mild mitral regurgitation   * 6/22: severe known 3 vessel CAD. Does have 70% lesion in SVG, but does not explain depressed EF  -- cardiology consult appreciated now signed off  - Cr up to 1.5.   -doing well. Cr stable at 1.4   -wt seems stable. Seems stable. Wt of 62kg seems outlier.wt stable 69-71kg.   -will start entresto 24-26mg tab one bid for now 48 hours after last ace inh dose. Discussed with cardiology  -has been feeling well. No cp or sob. Appears euvolemic. No chf sxs.       Plan;   -bmp today, if stable Cr. Start entresto.   - follow bmp at TCU  -- Resumed ASA  -- Digoxin level low at 0.4--there is question of compliance outpatient, resumed PTA dose 6/20   -- continue PTA toprol XL 100mg, lisinopril -->increased to 10mg once daily on 6/21  ---start entresto 24-26mg tab one bid 48 hours after last ace inh dose on 6/24. Discussed with cardiology  -- continue PTA lasix 20mg daily  -- continue atorvastatin 20mg daily  -- Fall precautions   -- plan for cardiac event monitor on discharge  --plan for medical optimization and then follow up in cardiology outpatient setting in 1 week per cardiology  -am bmp,      CLL  -Followed by [facility], treated with steroids for hemolytic anemia in 2012 and received chemotherapy at that time. More recently in the past year he has been found to have recurrence. In 4/2021 it seems he saw his [facility] team in follow up and since then has started Venetoclax. Not related to CM.  -Has been receiving Venetoclax and Rituxan  - Overall blood counts are stable  -- oncology appreciated  -- continue PTA venetoclax at 100mg daily---> Dr. England/oncology to modify dose to 200 mg daily. Per oncology, this can be held at TCU if needed, but if allowed since he has the prescription it would be okay to continue.  -allopurinol 300mg daily  -outpatient  follow up  -following blood counts. Wbc stable 3 range, Hb stable 9-10 range. Platelet [id number] range.   -Was due for rituximab 6/24 and this can be held for now per oncology  -  Continue [facility] follow up after TCU to resume treatment. Tentative orders for 7/8, but that can be changed depending TCU stay.    PAD  He is status post a left above-knee popliteal to below-knee popliteal bypass in 2014.  He then underwent a R leg below knee amputation in Oct 2018. He last saw Dr. Vick of Vascular Surgery in 12/2020 (at a virtual visit).   - Noted.  Stump site appears nl.     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 Noted.  -- await PT/OT evaluations     Paroxysmal atrial fibrillation  CHADS-VASC noted to be at least 6. He is meant to be on Eliquis but it seems he has been mis-adherent to medications at home.  -- eliquis to resumed on 6/23 AM     Hypertension  - resumed PTA lisinopril, increased to 10mg daily, continued toprol XL 100mg daily  -good control.  -cardiology outpt follow up, pcp follow up  -stopped lisinopril, changes to Entresto per cardiology     Dyslipidemia  - lipitor resumed at 20mg. Lipid profile with HDL 38, LDL 49, total cholesterol 116  -lipids in 6 weeks     Type 2 Diabetes mellitus  On Lantus, Metformin as an outpatient. His last admission here was 1/2020 for hyperglycemia and STORM and started on insulin at that time. Currently FS near 500. He does not appear to have been consistently taking his medications at home. A1C is 9.   BS now down to 100 range with increase in prandial aspart  -stopping metformin given ckd and advanced CM  -assess BS today on new dosing. No change to lantus  -good appetite  Plan;   -- medium resistance sliding scale insulin  -- 6  units TIDWM novolog---> increase to 9/11/11 dosing on 6/25.   -- 25 units lantus at bedtime 6/22  -- pharmacy liaison checking into SGLT2 inhibitors- Jardiance $28/month, invokana $27/ month. steglatro not covered, Farxiga not covered  -calculated CrCL by wt of 70Kg is ~43. Invokana could be an option for patient but defer to PCP- also note pt with PVD and prior BKA. Reports of risk for amputation with this drug need to be considered.  CrCL precludes other SGLT2 inh.   -will need to ensure stable renal fx outpatient given initiation of Entresto.      Pseudohyponatremia  Corrects to 136. Low sodium related to high BGs on admit     Chronic anemia, thrombocytopenia  Noted, seems near baseline  Daily iron resumed     Stage 3 CKD  Cr 1.4 which seems to be very near his recent previous values.   Cr stable at 1.4 on ace inh  Calculated CrCL 43  - Daily BMP  Changing to Entresto 6/26 and ace inh stopped.   -bmp today, start entresto if looks fine  Follow up cardiology   Bmp at TCU and weekly at tcu     Chronic Stage 2 sacral decubitus ulcers  Present on admission.   - WOC consulted, seen 6/21.   Orders in place      BPH  By history. Noted.      Depression  PTA Celexa continued   - PTA trazodone resumed at reduced dose, 50mg (instead of 150mg), given falls/confusion     GERD  PTA PPI, not used recently  - resumed     COVID-19 screening-negative     DVT Prophylaxis: heparin gtt  Code Status: Full Code  Expected discharge:TCU once bed available. SW consulted, still waiting on bed acceptance. Unlikely discharge over weekend.     Pt at high risk for rehospitalization and has demonstrated inability to care for self independently at this apartment. Discussed with pt's niece Mily Manntaylor who agrees with longterm structured living situation such as LTC bed at NH or assisted living with medication support, RN support, meals provided, assistance with ADLs. Discussed with pt      Efren Miller MD  Text Page  (7am to  6pm)  Interval History     BS down, stable night  Slept well. No complaints. No cp or sob    -Data reviewed today: I reviewed all new labs and imaging results over the last 24 hours. I personally reviewed imaging and labs last 24 hours    Physical Exam   Temp: 97.5  F (36.4  C) Temp src: Oral BP: (!) 140/60 Pulse: 63   Resp: 18 SpO2: 98 % O2 Device: None (Room air)    Vitals:    06/23/21 0416 06/25/21 0629 06/26/21 0621   Weight: 62.1 kg (137 lb) 71.1 kg (156 lb 12.8 oz) 69.5 kg (153 lb 3.2 oz)     Vital Signs with Ranges  Temp:  [97.5  F (36.4  C)-97.7  F (36.5  C)] 97.5  F (36.4  C)  Pulse:  [63-77] 63  Resp:  [18] 18  BP: (120-145)/(60-88) 140/60  SpO2:  [98 %] 98 %  I/O last 3 completed shifts:  In: 600 [P.O.:600]  Out: 1145 [Urine:1145]    Constitutional: In bed, nad  -Neck; nl jvp  Respiratory: CTAB  Cardiovascular: RRR no r/g/m  GI: soft, nt, nd  Skin/Integumen: no LE edema. RBKA site looks fine  Neuro: nl speech and mentation.   Psych: nl affect       Medications     Continuing ACE inhibitor/ARB/ARNI from home medication list OR ACE inhibitor/ARB order already placed during this visit       - MEDICATION INSTRUCTIONS -       - MEDICATION INSTRUCTIONS -       - MEDICATION INSTRUCTIONS -         allopurinol  300 mg Oral Daily     apixaban ANTICOAGULANT  5 mg Oral BID     aspirin  81 mg Oral Daily     atorvastatin  20 mg Oral Daily     citalopram  20 mg Oral QPM     digoxin  125 mcg Oral Daily     furosemide  20 mg Oral Daily     insulin aspart  9 Units Subcutaneous Daily with breakfast     insulin aspart  11 Units Subcutaneous Daily with lunch     insulin aspart  11 Units Subcutaneous Daily with supper     insulin aspart  1-7 Units Subcutaneous TID AC     insulin aspart  1-5 Units Subcutaneous At Bedtime     insulin glargine  25 Units Subcutaneous At Bedtime     metoprolol succinate ER  100 mg Oral Daily     pantoprazole  40 mg Oral Daily     [Held by provider] sacubitril-valsartan  1 tablet Oral BID     sodium  chloride (PF)  3 mL Intracatheter Q8H     venetoclax  200 mg Oral Daily with lunch       Data   Recent Labs   Lab 06/26/21  0618 06/24/21  0535 06/23/21  0535 06/22/21  0532 06/20/21  1209 06/20/21  1209 06/20/21  0707 06/20/21  0336 06/19/21  2237   WBC 3.7* 3.1* 2.9* 3.0*   < >  --  3.6* 3.7* 4.8   HGB 9.6* 9.2* 9.6* 9.9*   < >  --  10.0* 9.9* 11.2*   MCV 93 94 95 93   < >  --  92 91 91   * 102* 102* 114*   < >  --  116* 131* 145*   NA  --  137 134 133   < >  --  134  --  128*   POTASSIUM  --  3.8 3.9 3.9   < >  --  3.9  --  4.4   CHLORIDE  --  108 106 105   < >  --  102  --  95   CO2  --  24 22 21   < >  --  24  --  24   BUN  --  33* 32* 36*   < >  --  31*  --  33*   CR  --  1.40* 1.59* 1.34*   < >  --  1.36*  --  1.40*   ANIONGAP  --  5 6 7   < >  --  8  --  9   JUSTINO  --  9.1 8.9 9.2   < >  --  9.5  --  10.0   GLC  --  234* 311* 263*   < >  --  283*  --  487*   ALBUMIN  --   --   --   --   --   --  3.3*  --  4.0   PROTTOTAL  --   --   --   --   --   --  6.7*  --  7.7   BILITOTAL  --   --   --   --   --   --  0.9  --  1.1   ALKPHOS  --   --   --   --   --   --  72  --  84   ALT  --   --   --   --   --   --  24  --  26   AST  --   --   --   --   --   --  34  --  32   TROPI  --   --   --   --   --  3.629* 5.095* 5.523* 2.304*    < > = values in this interval not displayed.       Imaging:   No results found for this or any previous visit (from the past 24 hour(s)).

## 2021-06-27 ENCOUNTER — APPOINTMENT (OUTPATIENT)
Dept: OCCUPATIONAL THERAPY | Facility: CLINIC | Age: 81
DRG: 280 | End: 2021-06-27
Payer: COMMERCIAL

## 2021-06-27 LAB
ANION GAP SERPL CALCULATED.3IONS-SCNC: 6 MMOL/L (ref 3–14)
BUN SERPL-MCNC: 36 MG/DL (ref 7–30)
CALCIUM SERPL-MCNC: 9.3 MG/DL (ref 8.5–10.1)
CHLORIDE SERPL-SCNC: 108 MMOL/L (ref 94–109)
CO2 SERPL-SCNC: 22 MMOL/L (ref 20–32)
CREAT SERPL-MCNC: 1.28 MG/DL (ref 0.66–1.25)
GFR SERPL CREATININE-BSD FRML MDRD: 53 ML/MIN/{1.73_M2}
GLUCOSE BLDC GLUCOMTR-MCNC: 135 MG/DL (ref 70–99)
GLUCOSE BLDC GLUCOMTR-MCNC: 151 MG/DL (ref 70–99)
GLUCOSE BLDC GLUCOMTR-MCNC: 213 MG/DL (ref 70–99)
GLUCOSE BLDC GLUCOMTR-MCNC: 248 MG/DL (ref 70–99)
GLUCOSE BLDC GLUCOMTR-MCNC: 296 MG/DL (ref 70–99)
GLUCOSE SERPL-MCNC: 221 MG/DL (ref 70–99)
POTASSIUM SERPL-SCNC: 3.9 MMOL/L (ref 3.4–5.3)
SODIUM SERPL-SCNC: 136 MMOL/L (ref 133–144)

## 2021-06-27 PROCEDURE — 80048 BASIC METABOLIC PNL TOTAL CA: CPT | Performed by: INTERNAL MEDICINE

## 2021-06-27 PROCEDURE — 999N001017 HC STATISTIC GLUCOSE BY METER IP

## 2021-06-27 PROCEDURE — 210N000002 HC R&B HEART CARE

## 2021-06-27 PROCEDURE — 250N000013 HC RX MED GY IP 250 OP 250 PS 637: Performed by: HOSPITALIST

## 2021-06-27 PROCEDURE — 97110 THERAPEUTIC EXERCISES: CPT | Mod: GO

## 2021-06-27 PROCEDURE — 250N000012 HC RX MED GY IP 250 OP 636 PS 637: Performed by: INTERNAL MEDICINE

## 2021-06-27 PROCEDURE — 250N000013 HC RX MED GY IP 250 OP 250 PS 637: Performed by: INTERNAL MEDICINE

## 2021-06-27 PROCEDURE — 97535 SELF CARE MNGMENT TRAINING: CPT | Mod: GO

## 2021-06-27 PROCEDURE — 36415 COLL VENOUS BLD VENIPUNCTURE: CPT | Performed by: INTERNAL MEDICINE

## 2021-06-27 PROCEDURE — 99233 SBSQ HOSP IP/OBS HIGH 50: CPT | Performed by: INTERNAL MEDICINE

## 2021-06-27 RX ADMIN — APIXABAN 5 MG: 5 TABLET, FILM COATED ORAL at 08:49

## 2021-06-27 RX ADMIN — ASPIRIN 81 MG CHEWABLE TABLET 81 MG: 81 TABLET CHEWABLE at 08:48

## 2021-06-27 RX ADMIN — PANTOPRAZOLE SODIUM 40 MG: 40 TABLET, DELAYED RELEASE ORAL at 08:48

## 2021-06-27 RX ADMIN — SACUBITRIL AND VALSARTAN 1 TABLET: 24; 26 TABLET, FILM COATED ORAL at 20:08

## 2021-06-27 RX ADMIN — CITALOPRAM HYDROBROMIDE 20 MG: 20 TABLET ORAL at 20:09

## 2021-06-27 RX ADMIN — FUROSEMIDE 20 MG: 20 TABLET ORAL at 08:48

## 2021-06-27 RX ADMIN — APIXABAN 5 MG: 5 TABLET, FILM COATED ORAL at 20:08

## 2021-06-27 RX ADMIN — ATORVASTATIN CALCIUM 20 MG: 20 TABLET, FILM COATED ORAL at 08:49

## 2021-06-27 RX ADMIN — ALLOPURINOL 300 MG: 300 TABLET ORAL at 08:48

## 2021-06-27 RX ADMIN — SACUBITRIL AND VALSARTAN 1 TABLET: 24; 26 TABLET, FILM COATED ORAL at 08:49

## 2021-06-27 RX ADMIN — DIGOXIN 125 MCG: 125 TABLET ORAL at 08:48

## 2021-06-27 RX ADMIN — METOPROLOL SUCCINATE 100 MG: 100 TABLET, EXTENDED RELEASE ORAL at 08:48

## 2021-06-27 RX ADMIN — INSULIN ASPART 9 UNITS: 100 INJECTION, SOLUTION INTRAVENOUS; SUBCUTANEOUS at 08:49

## 2021-06-27 ASSESSMENT — MIFFLIN-ST. JEOR: SCORE: 1437.49

## 2021-06-27 ASSESSMENT — ACTIVITIES OF DAILY LIVING (ADL)
ADLS_ACUITY_SCORE: 14

## 2021-06-27 NOTE — PROGRESS NOTES
A&Ox4. VSS. Denies pain. Incontinent of bowel x2, linen change x2. Slept minimal amount overnight. BG at 0200 was 296. Monitor.

## 2021-06-27 NOTE — PLAN OF CARE
Patient remains vitally stable. Continuing current plan of care. Awaiting TCU placement. Will continue to monitor and assess patient.

## 2021-06-27 NOTE — PROGRESS NOTES
[facility]    Hospitalist Progress Note    Assessment & Plan   Tad Manning is a 80 year old male with extensive past medical history that is most notable for CLL, CAD, chronic systolic CHF, PAD status post right lower extremity BKA, Type 2 Diabetes mellitus, and chronic atrial fibrillation on Eliquis, among multiple others; who presents with recurrent falls and generalized weakness and is found to have possible ACS.     Possible ACS and acute metabolic encephalopathy  Ischemic cardiomyopathy--worsening, EF 20-25%  Of note, he has multi-vessel CAD, status post CABG (L-LAD, SVG-OM1, SVG-D1) in 1995, followed more recently by Left Heart catheterization (done pre-operatively) in 3/2018 which reportedly showed moderate disease in his RCA, severe LAD disease and patent grafts: he had two drug eluting stents placed in the proximal and mid LAD. Right sided catheterization done concomitantly showed elevated right and left sided pressures with type II pulmonary hypertension. he also has known ischemic cardiomyopathy causing chronic systolic CHF (Stage C, NYHA 3), most recently having LVEF 35-40% on TTE in 2018. He is followed by Dr. Valdes of [facility]. He presents for falls at home, with reported increased exertional dyspnea and confusion. Troponin is positive in the ED. He could have ACS, though alternatively he could have demand ischemia due to other cause of acute illness such as occult infection. Acute systolic CHF exacerbation is possible, though he appeared hypovolemic in the ED initially and IV fluid was given. Last, we note that he just resumed chemotherapy as below for CLL and this could be playing a role in his weakness. We note that UA and XR are negative for signs of infection there. There are no other evident acute neurologic deficits at this time to suggest stroke. He could have underlying as yet undiagnosed dementia  Trop 2.304-->5.523-->5.095-->3.629.  BNP 7364. Lipid  profile with HDL 38, LDL 49, total cholesterol 116  * 6/20 echocardiogram: EF 20-25%, RV normal, LA moderately dilated. Aortic valve sclerosis. Mild mitral regurgitation   * 6/22: severe known 3 vessel CAD. Does have 70% lesion in SVG, but does not explain depressed EF  -- cardiology consult appreciated now signed off  -Cr peak at 1.5, ace inh stopped and Cr down to 1.2. Entresto started, Cr stable at 1.2.   --wt stable 69-71kg.   -started entresto 24-26mg tab one bid for now 48 hours after last ace inh dose. Discussed with cardiology  -has been feeling well. No cp or sob. Appears euvolemic. No chf sxs.   -follow bmp       Plan;     - follow bmp at TCU  -- Resumed ASA  -- Digoxin level low at 0.4--there is question of compliance outpatient, resumed PTA dose 6/20   -- continue PTA toprol XL 100mg,  ---start entresto 24-26mg tab one bid 48 hours after last ace inh dose on 6/24. Discussed with cardiology, Lisinopril stopped 48 hours before starting entresto.  -- continue PTA lasix 20mg daily  -- continue atorvastatin 20mg daily  -- Fall precautions   -- plan for cardiac event monitor on discharge--not yet ordered.   --plan for medical optimization and then follow up in cardiology outpatient setting in 1 week per cardiology  -am margie,      CLL  -Followed by [facility], treated with steroids for hemolytic anemia in 2012 and received chemotherapy at that time. More recently in the past year he has been found to have recurrence. In 4/2021 it seems he saw his [facility] team in follow up and since then has started Venetoclax. Not related to CM.  -Has been receiving Venetoclax and Rituxan  - Overall blood counts are stable  -- oncology appreciated  -- continue PTA venetoclax at 100mg daily---> Dr. England/oncology to modify dose to 200 mg daily. Per oncology, this can be held at TCU if needed, but if allowed since he has the prescription it would be okay to continue.  -allopurinol 300mg daily  -outpatient follow up  -following  blood counts. Wbc stable 3 range, Hb stable 9-10 range. Platelet gnaez181-572 range.   -Was due for rituximab 6/24 and this can be held for now per oncology  -  Continue [facility] follow up after TCU to resume treatment. Tentative orders for 7/8, but that can be changed depending TCU stay.  -continue venetoclax at current dose, can be held at TCU if needed    PAD  He is status post a left above-knee popliteal to below-knee popliteal bypass in 2014.  He then underwent a R leg below knee amputation in Oct 2018. He last saw Dr. Vick of Vascular Surgery in 12/2020 (at a virtual visit).   - Noted.  Stump site appears nl.     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 Noted.  -- await PT/OT evaluations     Paroxysmal atrial fibrillation  CHADS-VASC noted to be at least 6. He is meant to be on Eliquis but it seems he has been mis-adherent to medications at home.  -rate controlled. On BBer  -- eliquis to resumed on 6/23 AM     Hypertension  - resumed PTA lisinopril, increased to 10mg daily, continued toprol XL 100mg daily  -good control.  -cardiology outpt follow up, pcp follow up  -stopped lisinopril, changes to Entresto per cardiology     Dyslipidemia  - lipitor resumed at 20mg. Lipid profile with HDL 38, LDL 49, total cholesterol 116  -lipids in 6 weeks     Type 2 Diabetes mellitus  On Lantus, Metformin as an outpatient. His last admission here was 1/2020 for hyperglycemia and STORM and started on insulin at that time. Currently FS near 500. He does not appear to have been consistently taking his medications at home. A1C is 9.   BS now down to 100 range with increase in prandial aspart  -stopping metformin given ckd and advanced CM  -assess BS today on new dosing. No change to  lantus  -good appetite  Plan;   -- medium resistance sliding scale insulin  -- TIDWM novolog---> increased to 9/11/12 --> reeval dosing tomorrow per bS today, may need slight increase  -- 26 units lantus at bedtime 6/22  -- pharmacy liaison checking into SGLT2 inhibitors- Jardiance $28/month, invokana $27/ month. steglatro not covered, Farxiga not covered  -calculated CrCL by wt of 70Kg is ~43. Invokana could be an option for patient but defer to PCP- also note pt with PVD and prior BKA. Reports of risk for amputation with this drug need to be considered.  CrCL precludes other SGLT2 inh.   -will need to ensure stable renal fx outpatient given initiation of Entresto.      Pseudohyponatremia  Corrects to 136. Low sodium related to high BGs on admit     Chronic anemia, thrombocytopenia  Noted, seems near baseline  Daily iron resumed     Stage 3 CKD  Cr 1.4 which seems to be very near his recent previous values.   Cr stable at 1.4 on ace inh  Calculated CrCL 43  Changing to Entresto 6/26 and ace inh stopped.   Cr down to 1.2 and stable.   Follow up cardiology   Bmp at TCU and weekly at tcu     Chronic Stage 2 sacral decubitus ulcers  Present on admission.   - WOC consulted, seen 6/21.   Orders in place      BPH  By history. Noted.      Depression  PTA Celexa continued   - PTA trazodone resumed at reduced dose, 50mg (instead of 150mg), given falls/confusion     GERD  PTA PPI, not used recently  - resumed     COVID-19 screening-negative     DVT Prophylaxis: heparin gtt  Code Status: Full Code  Expected discharge:TCU once bed available. SW consulted, still waiting on bed acceptance. Unlikely discharge over weekend.     Pt at high risk for rehospitalization and has demonstrated inability to care for self independently at this apartment. Discussed with pt's niece Mily Manning who agrees with longterm structured living situation such as LTC bed at NH or assisted living with medication support, RN support, meals provided,  assistance with ADLs. Discussed with pt and agrees, family agrees      Efren Miller MD  Text Page  (7am to 6pm)  Interval History     stable night  Slept well. No complaints. No cp or sob    -Data reviewed today: I reviewed all new labs and imaging results over the last 24 hours. I personally reviewed imaging and labs last 24 hours    Physical Exam   Temp: 98.2  F (36.8  C) Temp src: Oral BP: 134/75 Pulse: 63   Resp: 16 SpO2: 99 % O2 Device: None (Room air)    Vitals:    06/25/21 0629 06/26/21 0621 06/27/21 0541   Weight: 71.1 kg (156 lb 12.8 oz) 69.5 kg (153 lb 3.2 oz) 71.1 kg (156 lb 12.8 oz)     Vital Signs with Ranges  Temp:  [98.2  F (36.8  C)-98.9  F (37.2  C)] 98.2  F (36.8  C)  Pulse:  [63-65] 63  Resp:  [16-18] 16  BP: (134-162)/(72-78) 134/75  SpO2:  [99 %] 99 %  I/O last 3 completed shifts:  In: -   Out: 1425 [Urine:1425]    Constitutional: In bed, nad  -Neck; nl jvp  Respiratory: CTAB  Cardiovascular: RRR no r/g/m  GI: soft, nt, nd  Skin/Integumen: no LE edema. RBKA site looks fine  Neuro: nl speech and mentation.   Psych: nl affect       Medications     Continuing ACE inhibitor/ARB/ARNI from home medication list OR ACE inhibitor/ARB order already placed during this visit       - MEDICATION INSTRUCTIONS -       - MEDICATION INSTRUCTIONS -       - MEDICATION INSTRUCTIONS -         allopurinol  300 mg Oral Daily     apixaban ANTICOAGULANT  5 mg Oral BID     aspirin  81 mg Oral Daily     atorvastatin  20 mg Oral Daily     citalopram  20 mg Oral QPM     digoxin  125 mcg Oral Daily     furosemide  20 mg Oral Daily     insulin aspart  12 Units Subcutaneous Daily with supper     insulin aspart  9 Units Subcutaneous Daily with breakfast     insulin aspart  11 Units Subcutaneous Daily with lunch     insulin aspart  1-7 Units Subcutaneous TID AC     insulin aspart  1-5 Units Subcutaneous At Bedtime     insulin glargine  26 Units Subcutaneous At Bedtime     metoprolol succinate ER  100 mg Oral Daily      pantoprazole  40 mg Oral Daily     sacubitril-valsartan  1 tablet Oral BID     sodium chloride (PF)  3 mL Intracatheter Q8H     venetoclax  200 mg Oral Daily with lunch       Data   Recent Labs   Lab 06/27/21  0559 06/26/21  1401 06/26/21  0618 06/24/21  0535 06/23/21  0535 06/20/21  1209 06/20/21  1209   WBC  --   --  3.7* 3.1* 2.9*   < >  --    HGB  --   --  9.6* 9.2* 9.6*   < >  --    MCV  --   --  93 94 95   < >  --    PLT  --   --  136* 102* 102*   < >  --     137  --  137 134   < >  --    POTASSIUM 3.9 4.1  --  3.8 3.9   < >  --    CHLORIDE 108 108  --  108 106   < >  --    CO2 22 25  --  24 22   < >  --    BUN 36* 35*  --  33* 32*   < >  --    CR 1.28* 1.26*  --  1.40* 1.59*   < >  --    ANIONGAP 6 4  --  5 6   < >  --    JUSTINO 9.3 9.4  --  9.1 8.9   < >  --    * 173*  --  234* 311*   < >  --    TROPI  --   --   --   --   --   --  3.629*    < > = values in this interval not displayed.       Imaging:   No results found for this or any previous visit (from the past 24 hour(s)).

## 2021-06-28 ENCOUNTER — RECORDS - HEALTHEAST (OUTPATIENT)
Dept: LAB | Facility: CLINIC | Age: 81
End: 2021-06-28

## 2021-06-28 ENCOUNTER — APPOINTMENT (OUTPATIENT)
Dept: OCCUPATIONAL THERAPY | Facility: CLINIC | Age: 81
DRG: 280 | End: 2021-06-28
Payer: COMMERCIAL

## 2021-06-28 VITALS
TEMPERATURE: 97.8 F | RESPIRATION RATE: 16 BRPM | OXYGEN SATURATION: 95 % | WEIGHT: 156.6 LBS | HEIGHT: 70 IN | BODY MASS INDEX: 22.42 KG/M2 | SYSTOLIC BLOOD PRESSURE: 119 MMHG | HEART RATE: 73 BPM | DIASTOLIC BLOOD PRESSURE: 58 MMHG

## 2021-06-28 LAB
ANION GAP SERPL CALCULATED.3IONS-SCNC: 4 MMOL/L (ref 3–14)
BUN SERPL-MCNC: 43 MG/DL (ref 7–30)
CALCIUM SERPL-MCNC: 9.4 MG/DL (ref 8.5–10.1)
CHLORIDE SERPL-SCNC: 109 MMOL/L (ref 94–109)
CO2 SERPL-SCNC: 24 MMOL/L (ref 20–32)
CREAT SERPL-MCNC: 1.33 MG/DL (ref 0.66–1.25)
GFR SERPL CREATININE-BSD FRML MDRD: 51 ML/MIN/{1.73_M2}
GLUCOSE BLDC GLUCOMTR-MCNC: 186 MG/DL (ref 70–99)
GLUCOSE BLDC GLUCOMTR-MCNC: 193 MG/DL (ref 70–99)
GLUCOSE BLDC GLUCOMTR-MCNC: 251 MG/DL (ref 70–99)
GLUCOSE SERPL-MCNC: 197 MG/DL (ref 70–99)
POTASSIUM SERPL-SCNC: 4.1 MMOL/L (ref 3.4–5.3)
SODIUM SERPL-SCNC: 137 MMOL/L (ref 133–144)

## 2021-06-28 PROCEDURE — 250N000013 HC RX MED GY IP 250 OP 250 PS 637: Performed by: INTERNAL MEDICINE

## 2021-06-28 PROCEDURE — 999N001017 HC STATISTIC GLUCOSE BY METER IP

## 2021-06-28 PROCEDURE — 36415 COLL VENOUS BLD VENIPUNCTURE: CPT | Performed by: INTERNAL MEDICINE

## 2021-06-28 PROCEDURE — 99239 HOSP IP/OBS DSCHRG MGMT >30: CPT | Performed by: HOSPITALIST

## 2021-06-28 PROCEDURE — 250N000012 HC RX MED GY IP 250 OP 636 PS 637: Performed by: INTERNAL MEDICINE

## 2021-06-28 PROCEDURE — 250N000013 HC RX MED GY IP 250 OP 250 PS 637: Performed by: HOSPITALIST

## 2021-06-28 PROCEDURE — 80048 BASIC METABOLIC PNL TOTAL CA: CPT | Performed by: INTERNAL MEDICINE

## 2021-06-28 PROCEDURE — 97535 SELF CARE MNGMENT TRAINING: CPT | Mod: GO | Performed by: OCCUPATIONAL THERAPIST

## 2021-06-28 RX ADMIN — ASPIRIN 81 MG CHEWABLE TABLET 81 MG: 81 TABLET CHEWABLE at 10:03

## 2021-06-28 RX ADMIN — DIGOXIN 125 MCG: 125 TABLET ORAL at 10:03

## 2021-06-28 RX ADMIN — APIXABAN 5 MG: 5 TABLET, FILM COATED ORAL at 10:03

## 2021-06-28 RX ADMIN — METOPROLOL SUCCINATE 100 MG: 100 TABLET, EXTENDED RELEASE ORAL at 10:03

## 2021-06-28 RX ADMIN — ALLOPURINOL 300 MG: 300 TABLET ORAL at 10:03

## 2021-06-28 RX ADMIN — PANTOPRAZOLE SODIUM 40 MG: 40 TABLET, DELAYED RELEASE ORAL at 10:02

## 2021-06-28 RX ADMIN — SACUBITRIL AND VALSARTAN 1 TABLET: 24; 26 TABLET, FILM COATED ORAL at 10:03

## 2021-06-28 RX ADMIN — FUROSEMIDE 20 MG: 20 TABLET ORAL at 10:03

## 2021-06-28 RX ADMIN — INSULIN GLARGINE 26 UNITS: 100 INJECTION, SOLUTION SUBCUTANEOUS at 01:58

## 2021-06-28 RX ADMIN — ATORVASTATIN CALCIUM 20 MG: 20 TABLET, FILM COATED ORAL at 10:03

## 2021-06-28 RX ADMIN — INSULIN ASPART 9 UNITS: 100 INJECTION, SOLUTION INTRAVENOUS; SUBCUTANEOUS at 10:03

## 2021-06-28 ASSESSMENT — ACTIVITIES OF DAILY LIVING (ADL)
ADLS_ACUITY_SCORE: 14

## 2021-06-28 ASSESSMENT — MIFFLIN-ST. JEOR: SCORE: 1436.58

## 2021-06-28 NOTE — PLAN OF CARE
Pt was discharged to [facility] with his niece at 1600.  All documented belongings sent with pt.

## 2021-06-28 NOTE — PROGRESS NOTES
Care Management Follow Up    Length of Stay (days): 8    Expected Discharge Date: 06/28/21(TCU)     Concerns to be Addressed: discharge planning     Patient plan of care discussed at interdisciplinary rounds: Yes    Anticipated Discharge Disposition: Transitional Care     Anticipated Discharge Services: Other (see comment)(therapy)  Anticipated Discharge DME: None    Patient/family educated on Medicare website which has current facility and service quality ratings: yes  Education Provided on the Discharge Plan:  yes  Patient/Family in Agreement with the Plan: yes    Referrals Placed by CM/SW: Post Acute Facilities  Private pay costs discussed: Not applicable    Additional Information:  Reviewed chart.  Resent referrals to [facility], [facility], [facility]. [facility], [facility], and [facility].  Calls placed and messages left at all of the above facilities to follow up on the referrals.  Awaiting return calls.      CORKY Hobbs, [facility]    [phone]  [facility]

## 2021-06-28 NOTE — PROGRESS NOTES
Care Management Discharge Note    Discharge Date: 06/28/21(TCU)  Expected Time of Departure: 16:00    Discharge Disposition: Transitional Care    Discharge Services: Other (see comment)(therapy)    Discharge DME: None    Discharge Transportation: family or friend will provide    Private pay costs discussed: transportation costs    PAS Confirmation Code: [id number]  Patient/family educated on Medicare website which has current facility and service quality ratings: yes    Education Provided on the Discharge Plan:  yes  Persons Notified of Discharge Plans: patient, niale Crespo and Preeti  Patient/Family in Agreement with the Plan: yes    Handoff Referral Completed: No    Additional Information:  Received a call back from [facility].  They do not have a bed as they feel the acquity of the unit is too high.  Received a call back from [facility].  They have no available beds.  Received a call back from [facility].  They have a bed available for today.    Have not received calls back from the other facilities.  Updated patient and nieces Cherie and Preeti.  They are in agreement, but they are asking if we can call [facility] one more time.   Calls placed and messages left.  We still have not received calls back therefore we will proceed with the discharge to [facility].  Call placed to [facility] to get pre-auth from patient's insurance.  Received pre-auth number [id number].  The pre-auth is valid from 6-28-21 - 7-7-21.  Patient's niece Preeti will transport around 16:00 today.  She will pick patient up at door number 6.  Call placed to update [facility] and faxed the orders and the PAS.      PAS-RR    D: Per DHS regulation, TYSON completed and submitted PAS-RR to [place] [employer] Direct Connect via the [employer] LinkAge Line.  PAS-RR confirmation # is : [id number].    I: TYSON spoke with patient and nieces and they are aware a  PAS-RR has been submitted.  SW reviewed with patient and nieces that they may be contacted for a follow up appointment within 10 days of hospital discharge if their SNF stay is < 30 days.  Contact information for Senior LinkAge Line was also provided.    A: Patient and nieces verbalized understanding.    P: Further questions may be directed to Senior LinkAge Line at #[phone], option #4 for PAS-RR staff.        CORKY Hobbs, [facility]    [phone]  [facility]

## 2021-06-28 NOTE — PROGRESS NOTES
Pt here with mvd, going to medically manage.  30 day event monitor at discharge. A&O- forgetful.  VSS. Tele sr with 1st degree av block and pac.  Cardiac/mod cho diet,  Up with ax1 and brace for RBKA. Denies pain. Pt scoring green  on the Aggression Stop Light Tool. Plan- awaiting tcu placement, trying to find a bed that will take an oral chemo patient.

## 2021-06-28 NOTE — PROGRESS NOTES
"[facility] Progress Note     Primary Oncologist/Hematologist:  Tomás          Assessment and Plan:     Recent fall  - Etiology unclear  - Cardiac work up in progress. Event monitor being considered at discharge.      NSTEMI  - Troponin 2.3 on admission, 5.5 on recheck, then trending down.   - He denies recent chest pain or shortness of breath.   - Echocardiogram 6/20 shows EF 20-25% with severe global hypokinesia  - Coronary angiogram 6/23/21 \"Severe, known three-vessel native coronary artery disease.  Patent LIMA and marginal grafts.  70% lesion in the SVG to diagonal which itself is small and diffusely diseased.  Disease in the diagonal graft does not explain depressed EF and hence an intervention to this vessel is very unlikely to fix the problem.  Furthermore, severe diffuse disease in the native diagonal and poor distal runoff increases the risk of acute closure of stent in case intervention on graft is undertaken. Moreover, the patient is overall asymptomatic besides occasional shortness of breath.  Hence, we will manage this conservatively. Will likely benefit from switching from lisinopril to entresto and referral to EP for ICD if EF does not remove after medication optimization. This can be done on out pt basis when seen in cardiology out pt clinic. No further recommendations\"  - None of the above symptoms are typically seen as a side effect of venetoclax  - Medical management is planned  - Now on estresto  - Currently on apixaban, aspirin, statin, digoxin, diuretic and beta blocker.      Chronic lymphocytic leukemia, in second partial remission.    - Has been receiving Venetoclax and Rituxan  - Overall blood counts are stable  - Venetoclax 200 mg daily can continue. This can be held at TCU if needed, but if allowed since he has the prescription it would be okay to continue.  - Monitor blood counts serially  - Was due for rituximab 6/24 and this can be held for now  - Continue [facility] follow up after TCU to resume treatment.  - CBC in am     Type 2 diabetes mellitus   - Poor long-term disease control  - We are not likely to achieve excellent control given his comorbidities        OFELIA Nix, Nurse Practitioner  [facility]  [phone]          Interval History:     Doing well. Smiling. Pleasantly mildly confused.               Review of Systems:     The 5 point Review of Systems is negative other than noted in the HPI                Medications:   Scheduled Medications    allopurinol  300 mg Oral Daily     apixaban ANTICOAGULANT  5 mg Oral BID     aspirin  81 mg Oral Daily     atorvastatin  20 mg Oral Daily     citalopram  20 mg Oral QPM     digoxin  125 mcg Oral Daily     furosemide  20 mg Oral Daily     insulin aspart  12 Units Subcutaneous Daily with supper     insulin aspart  9 Units Subcutaneous Daily with breakfast     insulin aspart  11 Units Subcutaneous Daily with lunch     insulin aspart  1-7 Units Subcutaneous TID AC     insulin aspart  1-5 Units Subcutaneous At Bedtime     insulin glargine  26 Units Subcutaneous At Bedtime     metoprolol succinate ER  100 mg Oral Daily     pantoprazole  40 mg Oral Daily     sacubitril-valsartan  1 tablet Oral BID     sodium chloride (PF)  3 mL Intracatheter Q8H     venetoclax  200 mg Oral Daily with lunch     PRN Medications  acetaminophen, acetaminophen, bisacodyl, Continuing ACE inhibitor/ARB/ARNI from home medication list OR ACE inhibitor/ARB order already placed during this visit, - MEDICATION INSTRUCTIONS -, - MEDICATION INSTRUCTIONS -, glucose **OR** dextrose **OR** glucagon, HOLD MEDICATION, - MEDICATION INSTRUCTIONS -, melatonin, midazolam, naloxone **OR** naloxone **OR** naloxone **OR** naloxone, ondansetron **OR** ondansetron, polyethylene glycol, senna-docusate **OR** senna-docusate, sodium chloride (PF), traZODone               Physical Exam:   Vitals were reviewed  Blood pressure 132/65, pulse 57,  "temperature 97.8  F (36.6  C), temperature source Oral, resp. rate 16, height 1.778 m (5' 10\"), weight 71 kg (156 lb 9.6 oz), SpO2 95 %.  Wt Readings from Last 4 Encounters:   06/28/21 71 kg (156 lb 9.6 oz)   04/18/21 76.8 kg (169 lb 4.8 oz)   05/18/20 78.6 kg (173 lb 4.8 oz)   02/20/20 78.2 kg (172 lb 6.4 oz)       I/O last 3 completed shifts:  In: 240 [P.O.:240]  Out: 775 [Urine:775]                 Data:   All laboratory data and imaging studies reviewed.    CMP  Recent Labs   Lab 06/28/21  0610 06/27/21  0559 06/26/21  1401 06/24/21  0535 06/23/21  0535    136 137 137 134   POTASSIUM 4.1 3.9 4.1 3.8 3.9   CHLORIDE 109 108 108 108 106   CO2 24 22 25 24 22   ANIONGAP 4 6 4 5 6   * 221* 173* 234* 311*   BUN 43* 36* 35* 33* 32*   CR 1.33* 1.28* 1.26* 1.40* 1.59*   GFRESTIMATED 51* 53* 54* 48* 41*   GFRESTBLACK 59* 61 63 55* 47*   JUSTINO 9.4 9.3 9.4 9.1 8.9   MAG  --   --   --   --  2.0   PHOS  --   --   --   --  3.8     CBC  Recent Labs   Lab 06/26/21  0618 06/24/21  0535 06/23/21  0535 06/22/21  0532   WBC 3.7* 3.1* 2.9* 3.0*   RBC 3.09* 3.00* 3.04* 3.20*   HGB 9.6* 9.2* 9.6* 9.9*   HCT 28.6* 28.2* 28.8* 29.9*   MCV 93 94 95 93   MCH 31.1 30.7 31.6 30.9   MCHC 33.6 32.6 33.3 33.1   RDW 14.6 14.6 14.6 14.4   * 102* 102* 114*     INRNo lab results found in last 7 days.        Claribel Brady CNP  Nurse Practitioner  [facility]  [phone]      "

## 2021-06-28 NOTE — PROGRESS NOTES
[facility]    Hospitalist Progress Note      Assessment & Plan   Tad Manning is a 80 year old male who was admitted on 6/19/2021 with extensive past medical history that is most notable for CLL, CAD, chronic systolic CHF, PAD status post right lower extremity BKA, Type 2 Diabetes mellitus, and chronic atrial fibrillation on Eliquis, among multiple others; who presents with recurrent falls and generalized weakness and is found to have possible ACS.    Possible ACS and acute metabolic encephalopathy  Ischemic cardiomyopathy--worsening, EF 20-25%  Of note, he has multi-vessel CAD, status post CABG (L-LAD, SVG-OM1, SVG-D1) in 1995, followed more recently by Left Heart catheterization (done pre-operatively) in 3/2018 which reportedly showed moderate disease in his RCA, severe LAD disease and patent grafts: he had two drug eluting stents placed in the proximal and mid LAD. Right sided catheterization done concomitantly showed elevated right and left sided pressures with type II pulmonary hypertension. he also has known ischemic cardiomyopathy causing chronic systolic CHF (Stage C, NYHA 3), most recently having LVEF 35-40% on TTE in 2018. He is followed by Dr. Valdes of [facility]. He presents for falls at home, with reported increased exertional dyspnea and confusion. Troponin is positive in the ED. He could have ACS, though alternatively he could have demand ischemia due to other cause of acute illness such as occult infection. Acute systolic CHF exacerbation is possible, though he appeared hypovolemic in the ED initially and IV fluid was given. Last, we note that he just resumed chemotherapy as below for CLL and this could be playing a role in his weakness. We note that UA and XR are negative for signs of infection there. There are no other evident acute neurologic deficits at this time to suggest stroke. He could have underlying as yet undiagnosed dementia  Trop  2.304-->5.523-->5.095-->3.629.  BNP 7364. Lipid profile with HDL 38, LDL 49, total cholesterol 116  * 6/20 echocardiogram: EF 20-25%, RV normal, LA moderately dilated. Aortic valve sclerosis. Mild mitral regurgitation   * 6/22: severe known 3 vessel CAD. Does have 70% lesion in SVG, but does not explain depressed EF  - cardiology consult appreciated now signed off  - Cr peak at 1.5, ace inh stopped and Cr down to 1.2. Entresto started, Cr stable 1.2-1.3 range  - wt stable 69-71kg.   - started entresto 24-26mg tab one bid on 6/26    - follow bmp at TCU  - Resumed ASA  - Digoxin level low at 0.4--there is question of compliance outpatient, resumed PTA dose 6/20   - continue PTA toprol XL 100mg,  - continue PTA lasix 20mg daily  - continue atorvastatin 20mg daily  - plan for cardiac event monitor on discharge--not yet ordered.   -plan for medical optimization and then follow up in cardiology outpatient setting in 1 week per cardiology     CLL  -Followed by [facility], treated with steroids for hemolytic anemia in 2012 and received chemotherapy at that time. More recently in the past year he has been found to have recurrence. In 4/2021 it seems he saw his [facility] team in follow up and since then has started Venetoclax. Not related to CM.  -Has been receiving Venetoclax and Rituxan  - oncology appreciated  - continue PTA venetoclax at 200mg/daily. Per oncology, this can be held at TCU if needed, but if allowed since he has the prescription it would be okay to continue.  - allopurinol 300mg daily  - Was due for rituximab 6/24 and this can be held for now per oncology  - Continue [facility] follow up after TCU to resume treatment. Tentative orders for 7/8, but that can be changed depending TCU stay.     PAD  He is status post a left above-knee popliteal to below-knee popliteal bypass in 2014.  He then underwent a R leg below knee amputation in Oct 2018. He last saw Dr. Vick of Vascular Surgery in 12/2020 (at a virtual  visit).   - Noted.     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 Noted.     Paroxysmal atrial fibrillation  CHADS-VASC noted to be at least 6. He is meant to be on Eliquis but it seems he has been mis-adherent to medications at home.  - rate controlled on beta blocker  - eliquis resumed 6/23     Hypertension  - Continued toprol XL 100mg daily  - cardiology outpt follow up, pcp follow up  - stopped PTA lisinopril, changed to Entresto per cardiology     Dyslipidemia  - lipitor resumed at 20mg. Lipid profile with HDL 38, LDL 49, total cholesterol 116     Type 2 Diabetes mellitus  On Lantus, Metformin as an outpatient. His last admission here was 1/2020 for hyperglycemia and STORM and started on insulin at that time. Currently FS near 500. He does not appear to have been consistently taking his medications at home. A1C is 9.   -stopping metformin given ckd and advanced CM  - medium resistance sliding scale insulin  - TIDWM novolog  9/11/12  - increase evening Lantus from 26 units to 30 units qpm  -- pharmacy liaison checking into SGLT2 inhibitors- Jardiance $28/month, invokana $27/ month. steglatro and Farxiga not covered  -------- calculated CrCL by wt of 70Kg is ~43. Invokana could be an option for patient but defer to PCP- also note pt with PVD and prior BKA. Reports of risk for amputation with this drug need to be considered.  CrCL precludes other SGLT2 inh.      Pseudohyponatremia  Corrects to 136. Low sodium related to high BGs on admit     Chronic anemia, thrombocytopenia  Noted, seems near baseline  - Daily iron resumed     Stage 3 CKD  Cr 1.4 which seems to be very near his recent previous values. Calculated CrCL 43  - Changing to Entresto 6/26 and ace inh stopped.   - weekly  BMP at TCU     Chronic Stage 2 sacral decubitus ulcers  Present on admission.   - WOC consulted, seen 6/21.       BPH  By history. Noted.      Depression  PTA Celexa continued   - PTA trazodone resumed at reduced dose, 50mg (instead of 150mg), given falls/confusion     GERD  PTA PPI, not used recently  - resumed     COVID-19 screening-negative    DVT Prophylaxis: eliquis  Code Status: Full Code  Expected discharge: stable for discharge, awaiting TCU acceptance    Liset Lance, DO  Text Page (7am - 6pm)    Interval History   Patient seen and examined. He is doing well. No chest pain, shortness of breath, nausea, vomiting. Tolerating diet. He knows we are still waiting for TCU and is still amenable to go. Slept well overnight  Discussed care plan with SW    -Data reviewed today: I reviewed all new labs and imaging results over the last 24 hours. I personally reviewed no images or EKG's today.    Physical Exam   Temp: 97.8  F (36.6  C) Temp src: Oral BP: 119/58(post ambulation measures) Pulse: 73   Resp: 16 SpO2: 95 % O2 Device: None (Room air)    Vitals:    06/26/21 0621 06/27/21 0541 06/28/21 0617   Weight: 69.5 kg (153 lb 3.2 oz) 71.1 kg (156 lb 12.8 oz) 71 kg (156 lb 9.6 oz)     Vital Signs with Ranges  Temp:  [97.8  F (36.6  C)-99  F (37.2  C)] 97.8  F (36.6  C)  Pulse:  [57-73] 73  Resp:  [12-16] 16  BP: (109-139)/(53-67) 119/58  SpO2:  [95 %-97 %] 95 %  I/O last 3 completed shifts:  In: 240 [P.O.:240]  Out: 775 [Urine:775]    Constitutional: Awake, alert, cooperative, no apparent distress  Respiratory: Clear to auscultation bilaterally, no crackles or wheezing  Cardiovascular: Regular rate and rhythm, normal S1 and S2, and no murmur noted  GI: Normal bowel sounds, soft, non-distended, non-tender  Skin/Integumen: No rashes, no cyanosis, no edema  Other: Right BKA with prosthesis in place    Medications     Continuing ACE inhibitor/ARB/ARNI from home medication list OR ACE inhibitor/ARB order already placed  during this visit       - MEDICATION INSTRUCTIONS -       - MEDICATION INSTRUCTIONS -       - MEDICATION INSTRUCTIONS -         allopurinol  300 mg Oral Daily     apixaban ANTICOAGULANT  5 mg Oral BID     aspirin  81 mg Oral Daily     atorvastatin  20 mg Oral Daily     citalopram  20 mg Oral QPM     digoxin  125 mcg Oral Daily     furosemide  20 mg Oral Daily     insulin aspart  12 Units Subcutaneous Daily with supper     insulin aspart  9 Units Subcutaneous Daily with breakfast     insulin aspart  11 Units Subcutaneous Daily with lunch     insulin aspart  1-7 Units Subcutaneous TID AC     insulin aspart  1-5 Units Subcutaneous At Bedtime     insulin glargine  30 Units Subcutaneous At Bedtime     metoprolol succinate ER  100 mg Oral Daily     pantoprazole  40 mg Oral Daily     sacubitril-valsartan  1 tablet Oral BID     sodium chloride (PF)  3 mL Intracatheter Q8H     venetoclax  200 mg Oral Daily with lunch       Data   Recent Labs   Lab 06/28/21  0610 06/27/21  0559 06/26/21  1401 06/26/21  0618 06/24/21  0535 06/23/21  0535   WBC  --   --   --  3.7* 3.1* 2.9*   HGB  --   --   --  9.6* 9.2* 9.6*   MCV  --   --   --  93 94 95   PLT  --   --   --  136* 102* 102*    136 137  --  137 134   POTASSIUM 4.1 3.9 4.1  --  3.8 3.9   CHLORIDE 109 108 108  --  108 106   CO2 24 22 25  --  24 22   BUN 43* 36* 35*  --  33* 32*   CR 1.33* 1.28* 1.26*  --  1.40* 1.59*   ANIONGAP 4 6 4  --  5 6   JUSTINO 9.4 9.3 9.4  --  9.1 8.9   * 221* 173*  --  234* 311*       No results found for this or any previous visit (from the past 24 hour(s)).

## 2021-06-28 NOTE — PROGRESS NOTES
[facility] Nurse Inpatient Wound Assessment   Reason for consultation: Evaluate and treat  Sacral/gluteal cleft/bilateral buttock/groin wounds    Assessment  Sacral/gluteal cleft/bilateral buttock wounds due to Mixed Etiology: MASD with possible fungal and pressure components  Status: improving     Light, blanchable pink discoloration with flaking dry epidermis to bilateral buttock and sacrum.   Treatment Plan  Perineal/Sacral/gluteal cleft/ groin wounds:effective now  1. Cleanse perineum/bilateral buttock/sacrum well with Mahsa Cleanser and Lonny wipes after every incontinence.  - Ensure no brief when in bed and turning fully side to side Q2H.   NOTE- position pt side to side only in bed, every 2 hours  - when up to the chair do not sit for longer than 2 hours before standing or returning to bed, to fully off load and air out tissue.    Orders Written  Recommended provider order: None, at this time  WO Nurse follow-up plan:weekly  Nursing to notify the Provider(s) and re-consult the WO Nurse if wound(s) deteriorates or new skin concern.    Patient History  According to provider note(s):  Tad Manning is a 80 year old male with extensive past medical history that is most notable for CLL, CAD, chronic systolic CHF, PAD status post right lower extremity BKA, Type 2 Diabetes mellitus, and chronic atrial fibrillation on Eliquis, among multiple others; who presents with recurrent falls and generalized weakness and is found to have possible ACS.    Objective Data  Containment of urine/stool: Incontinence Protocol, Brief and Incontinent pad in bed was up in chair at time of assessment    Active Diet Order  Orders Placed This Encounter      Moderate Consistent CHO Diet      Advance Diet as Tolerated      Output:   I/O last 3 completed shifts:  In: 240 [P.O.:240]  Out: 775 [Urine:775]    Risk Assessment:   Sensory Perception: 4-->no impairment  Moisture: 4-->rarely moist  Activity: 3-->walks occasionally  Mobility:  4-->no limitation  Nutrition: 3-->adequate  Friction and Shear: 3-->no apparent problem  Roberto Score: 21                          Labs:   Recent Labs   Lab 06/26/21  0618   HGB 9.6*   WBC 3.7*       Physical Exam  Areas of skin assessed: focused perineal, gluteal cleft, bilateral buttock and sacrum     Wound Location:  Gluteal cleft, bilateral buttock, and sacrum  Date of last photo 6-28-21              Wound History: Patient reports using incontinence pad and changing once a day, meaning patient is likely sitting in urine and stool most of day. He reports sitting in a bandar recliner most of day. He does not have hygiene assistance at home.    Wound Base: Light, blanchable erythema with dry flaking epidermis to bilateral buttock and sacrum, groin is not involved.     Palpation of the wound bed: normal      Drainage: none     Description of drainage: none     Measurements (length x width x depth, in cm) approximately 30  x 33  X without erythema  Periwound skin: erythema- blanchable and rash      Color: pink      Temperature: normal  Odor: none  Pain: denies     Interventions  Visual inspection and assessment completed   Wound Care Rationale Protect periwound skin, Promote moist wound healing without tissue dehydration  and Provide protection   Wound Care: completed by RN  Supplies: floor stock and discussed with RN  Current off-loading measures: Pillows  Current support surface: Standard  Low air loss mattress  Education provided to: importance of repositioning, plan of care, Moisture management, Hygiene and Off-loading pressure  Discussed plan of care with Patient and Nurse    Loree Ruiz RN CWOCN

## 2021-06-28 NOTE — DISCHARGE SUMMARY
[facility]    Discharge Summary  Hospitalist    Date of Admission:  6/19/2021  Date of Discharge:  6/28/2021  Discharging Provider: Liset Lance DO  Date of Service (when I saw the patient): 06/28/21    Discharge Diagnoses   Possible ACS and acute metabolic encephalopathy  Ischemic cardiomyopathy--worsening, EF 20-25%  CLL  PAD  Vertebral osteomyelitis  Paroxysmal atrial fibrillation  Hypertension  Dyslipidemia  Type 2 Diabetes mellitus  Pseudohyponatremia  Chronic anemia, thrombocytopenia  Stage 3 CKD  Chronic Stage 2 sacral decubitus ulcers  BPH  Depression   GERD  Severe malnutrition  COVID-19 screening-negative    History of Present Illness   Tad Manning is an 80 year old male who presented with extensive past medical history that is most notable for CLL, CAD, chronic systolic CHF, PAD status post right lower extremity BKA, Type 2 Diabetes mellitus, and chronic atrial fibrillation on Eliquis, among multiple others; who presents with recurrent falls and generalized weakness and is found to have worsening ischemic cardiomyopathy.    Hospital Course   Tad Manning was admitted on 6/19/2021.  The following problems were addressed during his hospitalization:    Possible ACS and acute metabolic encephalopathy  Ischemic cardiomyopathy--worsening, EF 20-25%  Of note, he has multi-vessel CAD, status post CABG (L-LAD, SVG-OM1, SVG-D1) in 1995, followed more recently by Left Heart catheterization (done pre-operatively) in 3/2018 which reportedly showed moderate disease in his RCA, severe LAD disease and patent grafts: he had two drug eluting stents placed in the proximal and mid LAD. Right sided catheterization done concomitantly showed elevated right and left sided pressures with type II pulmonary hypertension. he also has known ischemic cardiomyopathy causing chronic systolic CHF (Stage C, NYHA 3), most recently having LVEF 35-40% on TTE in 2018. He is followed by Dr. Valdes  of [facility]. He presents for falls at home, with reported increased exertional dyspnea and confusion. Troponin is positive in the ED. He could have ACS, though alternatively he could have demand ischemia due to other cause of acute illness such as occult infection. Acute systolic CHF exacerbation is possible, though he appeared hypovolemic in the ED initially and IV fluid was given. Last, we note that he just resumed chemotherapy as below for CLL and this could be playing a role in his weakness. We note that UA and XR are negative for signs of infection there. There are no other evident acute neurologic deficits at this time to suggest stroke. He could have underlying as yet undiagnosed dementia  Trop 2.304-->5.523-->5.095-->3.629.  BNP 7364. Lipid profile with HDL 38, LDL 49, total cholesterol 116  * 6/20 echocardiogram: EF 20-25%, RV normal, LA moderately dilated. Aortic valve sclerosis. Mild mitral regurgitation   * 6/22: severe known 3 vessel CAD. Does have 70% lesion in SVG, but does not explain depressed EF  - cardiology consult appreciated now signed off  - Cr peak at 1.5, ace inh stopped and Cr down to 1.2. Entresto started, Cr stable 1.2-1.3 range  - wt stable 69-71kg.   - started entresto 24-26mg tab one bid on 6/26  - follow up BMP on 6/30 and then weekly thereafter  - Resumed ASA  - Digoxin level low at 0.4--there is question of compliance outpatient, resumed PTA dose 6/20   - continue PTA toprol XL 100mg,  - continue PTA lasix 20mg daily  - continue atorvastatin 20mg daily  - plan for cardiac event monitor on discharge  -plan for medical optimization and then follow up in cardiology outpatient setting in 1 week per cardiology     CLL  -Followed by [facility], treated with steroids for hemolytic anemia in 2012 and received chemotherapy at that time. More recently in the past year he has been found to have recurrence. In 4/2021 it seems he saw his [facility] team in follow up and since then has  started Venetoclax. Not related to CM.  -Has been receiving Venetoclax and Rituxan  - oncology appreciated  - Resume PTA venetoclax at 200mg/daily after discharge from TCU--okay to hold this medication while at TCU per oncology  - allopurinol 300mg daily  - Was due for rituximab 6/24 and this can be held for now per oncology  - Continue [facility] follow up after TCU to resume treatment. Tentative orders for 7/8, but that can be changed depending TCU stay.     PAD  He is status post a left above-knee popliteal to below-knee popliteal bypass in 2014.  He then underwent a R leg below knee amputation in Oct 2018. He last saw Dr. Vick of Vascular Surgery in 12/2020 (at a virtual visit).   - Noted.     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 Noted.     Paroxysmal atrial fibrillation  CHADS-VASC noted to be at least 6. He is meant to be on Eliquis but it seems he has been mis-adherent to medications at home.  - rate controlled on beta blocker  - eliquis resumed 6/23     Hypertension  - Continued toprol XL 100mg daily  - cardiology outpt follow up, pcp follow up  - stopped PTA lisinopril, changed to Entresto per cardiology     Dyslipidemia  - lipitor resumed at 20mg. Lipid profile with HDL 38, LDL 49, total cholesterol 116     Type 2 Diabetes mellitus  On Lantus, Metformin as an outpatient. His last admission here was 1/2020 for hyperglycemia and STORM and started on insulin at that time. Currently FS near 500. He does not appear to have been consistently taking his medications at home. A1C is 9.   -stopping metformin given ckd and advanced CM  - TIDWM novolog  9/11/12  - increase evening Lantus from 26 units to 30 units qpm starting 6/28 evening  -- SGLT2 inhibitors and their costs-  Jardiance $28/month, invokana $27/ month. steglatro and Farxiga not covered  -------- calculated CrCL by wt of 70Kg is ~43. Invokana could be an option for patient but defer to PCP- also note pt with PVD and prior BKA. Reports of risk for amputation with this drug need to be considered.  CrCL precludes other SGLT2 inhibitor.      Pseudohyponatremia  Corrects to 136. Low sodium related to high BGs on admit     Chronic anemia, thrombocytopenia  Noted, seems near baseline  - Daily iron resumed     Stage 3 CKD  Cr 1.4 which seems to be very near his recent previous values. Calculated CrCL 43  - Changing to Entresto 6/26 and ace inh stopped.   - weekly BMP at TCU     Chronic Stage 2 sacral decubitus ulcers  Present on admission.   - WOC consulted, seen 6/21.       BPH  By history. Noted.      Depression  PTA Celexa continued   - PTA trazodone resumed at reduced dose, 50mg (instead of 150mg), given falls/confusion     GERD  PTA PPI, not used recently  - resumed    Severe malnutrition in context of chronic illness or disease  % Weight Loss:  > 10% in 6 months (severe malnutrition)  % Intake:  No decreased intake noted  Subcutaneous Fat Loss:  Orbital region moderate depletion and Upper arm region moderate depletion  Muscle Loss:  Temporal region mild depletion, Clavicle bone region moderate depletion, Acromion bone region moderate depletion and Dorsal hand region moderate depletion  Fluid Retention:  None noted  - appreciate nutrition evaluation during hospital stay     COVID-19 screening-negative    Liset Lance, DO    Significant Results and Procedures   6/22: severe multivessel CAD on cardiac cath- recommended medical management    Pending Results   NA    Code Status   Full Code       Primary Care Physician   Gene Guevara    Physical Exam   Temp: 97.8  F (36.6  C) Temp src: Oral BP: 119/58(post ambulation measures) Pulse: 73   Resp: 16 SpO2: 95 % O2 Device: None (Room air)    Vitals:    06/26/21 0621 06/27/21 0541  06/28/21 0617   Weight: 69.5 kg (153 lb 3.2 oz) 71.1 kg (156 lb 12.8 oz) 71 kg (156 lb 9.6 oz)     Vital Signs with Ranges  Temp:  [97.8  F (36.6  C)-99  F (37.2  C)] 97.8  F (36.6  C)  Pulse:  [57-73] 73  Resp:  [12-16] 16  BP: (109-139)/(53-67) 119/58  SpO2:  [95 %-97 %] 95 %  I/O last 3 completed shifts:  In: 240 [P.O.:240]  Out: 775 [Urine:775]    Patient seen and examined on day of discharge. He is doing well. No chest pain, shortness of breath, nausea, vomiting. Tolerating diet. Agreeable to TCU on discharge. Slept well overnight. Stable for discharge to TCU today.    Constitutional: Awake, alert, cooperative, no apparent distress.  Eyes: Conjunctiva and pupils examined and normal.  HEENT: Moist mucous membranes, normal dentition.  Respiratory: Clear to auscultation bilaterally, no crackles or wheezing.  Cardiovascular: Regular rate and rhythm, normal S1 and S2, and no murmur noted.  GI: Soft, non-distended, non-tender, normal bowel sounds.  Lymph/Hematologic: No anterior cervical or supraclavicular adenopathy.  Skin: No rashes, no cyanosis, no edema.  Musculoskeletal: No joint swelling, erythema or tenderness. Right BKA with prosthesis in place  Neurologic: Cranial nerves 2-12 intact, normal strength and sensation.  Psychiatric: Alert, oriented to person, place and time, no obvious anxiety or depression.    Discharge Disposition   Discharged to TCU  Condition at discharge: Stable    Consultations This Hospital Stay   PHARMACY IP CONSULT  PHARMACY IP CONSULT  CARDIAC REHAB IP CONSULT  CARDIOLOGY IP CONSULT  PHARMACY IP CONSULT  PHARMACY IP CONSULT  WOUND OSTOMY CONTINENCE NURSE  IP CONSULT  HEMATOLOGY & ONCOLOGY IP CONSULT  PHARMACY IP CONSULT  PHARMACY IP CONSULT  PHARMACY LIAISON FOR MEDICATION COVERAGE CONSULT  OCCUPATIONAL THERAPY ADULT IP CONSULT  PHYSICAL THERAPY ADULT IP CONSULT  CARE MANAGEMENT / SOCIAL WORK IP CONSULT  OCCUPATIONAL THERAPY ADULT IP CONSULT  PHYSICAL THERAPY ADULT IP CONSULT  SMOKING  CESSATION PROGRAM IP CONSULT    Time Spent on this Encounter   ILiset DO, personally saw the patient today and spent greater than 30 minutes discharging this patient.    Discharge Orders      Basic metabolic panel     Follow Up and recommended labs and tests    TCU staff- Call [facility] ([phone]) to schedule follow up with Dr. England as he is getting ready to discharge to home.     General info for SNF    Length of Stay Estimate: Short Term Care: Estimated # of Days <30  Condition at Discharge: Improving  Level of care:skilled   Rehabilitation Potential: Good  Admission H&P remains valid and up-to-date: Yes  Recent Chemotherapy: N/A  Use Nursing Home Standing Orders: Yes     Mantoux instructions    Give two-step Mantoux (PPD) Per Facility Policy Yes     Reason for your hospital stay    Possible Acute coronary syndrome  Ischemic Cardiomyopathy  Metabolic encephalopathy     Glucose monitor nursing POCT    Before meals and at bedtime     Intake and output    Every shift     Daily weights    Call Provider for weight gain of more than 2 pounds per day or 5 pounds per week.     Wound care (specify)    Site:     Perineal/Sacral/gluteal cleft/ groin wounds:effective now  - perineal cares with Lonny Wipes and water, no barrier cream needed here  - dust with antifungal powder x 5 days, switching to baby powder on June 24, 2021  - x 3 days, BID- do VASHE soaks to the bilateral groin, gluteal ceft     Wet gauze with VASHE and press to the moist, effected tissue, let sit and soak x 30minutes, wipe dry and dust with powder  NOTE- position pt side to side only in bed, every 2 hours  - when up to the chair do not sit for longer than 2 hours before standing or returning to bed, to fully off load and air out tissue.      -Low air loss mattress     Activity - Up with nursing assistance     Follow Up and recommended labs and tests    1. Follow up with [facility] cardiology 1 week  2. Follow up with pcp  1 week after discharge from hospital  3. BMP in 2 days then weekly  4. Resume your venetoclax (200mg once daily) after you leave the TCU     Full Code     Occupational Therapy Adult Consult    Evaluate and treat as clinically indicated.    Reason:  Resolving metabolic encephalopathy. Deconditioned, Right BKA     Physical Therapy Adult Consult    Evaluate and treat as clinically indicated.    Reason:  Right BKA, deconditioned,     Cardiac Event Monitor Adult/Pediatric     Fall precautions     Advance Diet as Tolerated    Follow this diet upon discharge: Orders Placed This Encounter      Moderate Consistent CHO Diet     Discharge Medications   Current Discharge Medication List      START taking these medications    Details   !! insulin aspart (NOVOLOG PEN) 100 UNIT/ML pen Inject 9 Units Subcutaneous daily (with breakfast)  Qty:      Associated Diagnoses: Type 2 diabetes mellitus with stage 3b chronic kidney disease, unspecified whether long term insulin use (H)      !! insulin aspart (NOVOLOG PEN) 100 UNIT/ML pen Inject 11 Units Subcutaneous daily (with lunch)  Qty:      Associated Diagnoses: Type 2 diabetes mellitus with stage 3b chronic kidney disease, unspecified whether long term insulin use (H)      !! insulin aspart (NOVOLOG PEN) 100 UNIT/ML pen Inject 12 Units Subcutaneous daily (with dinner)  Qty:      Associated Diagnoses: Type 2 diabetes mellitus with stage 3b chronic kidney disease, unspecified whether long term insulin use (H)      sacubitril-valsartan (ENTRESTO) 24-26 MG per tablet Take 1 tablet by mouth 2 times daily  Qty:      Associated Diagnoses: Chronic systolic heart failure (H)       !! - Potential duplicate medications found. Please discuss with provider.      CONTINUE these medications which have CHANGED    Details   Ferrous Sulfate 324 (65 Fe) MG TBEC Take 1 tablet (324 mg) by mouth daily    Associated Diagnoses: Other iron deficiency anemia      insulin glargine (LANTUS PEN) 100 UNIT/ML pen  Inject 30 Units Subcutaneous At Bedtime  Qty:      Comments: If Lantus is not covered by insurance, may substitute Basaglar at same dose and frequency.    Associated Diagnoses: Type 2 diabetes mellitus with stage 3b chronic kidney disease, unspecified whether long term insulin use (H)      traZODone (DESYREL) 50 MG tablet Take 1 tablet (50 mg) by mouth nightly as needed for sleep  Qty:      Associated Diagnoses: Insomnia, unspecified type         CONTINUE these medications which have NOT CHANGED    Details   alcohol swab prep pads Use to swab area of injection/jina as directed.  Qty: 100 each, Refills: 3    Associated Diagnoses: Type 2 diabetes mellitus with stage 3 chronic kidney disease, unspecified whether long term insulin use      allopurinol (ZYLOPRIM) 300 MG tablet Take 300 mg by mouth daily      apixaban ANTICOAGULANT (ELIQUIS ANTICOAGULANT) 5 MG tablet Take 1 tablet (5 mg) by mouth 2 times daily  Qty: 180 tablet, Refills: 3    Associated Diagnoses: Paroxysmal A-fib (H)      aspirin (ASA) 81 MG tablet Take 1 tablet (81 mg) by mouth daily    Associated Diagnoses: CAD S/P percutaneous coronary angioplasty      atorvastatin (LIPITOR) 20 MG tablet Take 1 tablet (20 mg) by mouth daily  Qty: 90 tablet, Refills: 0    Associated Diagnoses: Claudication of left lower extremity (H)      blood glucose (NO BRAND SPECIFIED) test strip Use to test blood sugar 4 times daily or as directed.  Qty: 100 strip, Refills: 6    Associated Diagnoses: Type 2 diabetes mellitus with stage 3 chronic kidney disease, unspecified whether long term insulin use      blood glucose calibration (NO BRAND SPECIFIED) solution 1 drop every 14 days Use 1 drop to calibrate blood glucose monitor every 14 days  Qty: 1 Bottle, Refills: 3    Associated Diagnoses: Type 2 diabetes mellitus with stage 3 chronic kidney disease, unspecified whether long term insulin use      blood glucose monitoring (NO BRAND SPECIFIED) meter device kit Use to test blood  sugar 4 times daily or as directed.  Qty: 1 kit, Refills: 0    Associated Diagnoses: Type 2 diabetes mellitus with stage 3 chronic kidney disease, unspecified whether long term insulin use      cholecalciferol 1000 UNITS TABS Take 1,000 Units by mouth daily  Qty: 30 tablet    Associated Diagnoses: Loop diuretic causing adverse effect in therapeutic use, subsequent encounter      citalopram (CELEXA) 20 MG tablet Take 20 mg by mouth every evening      digoxin (LANOXIN) 125 MCG tablet Take 1 tablet (125 mcg) by mouth daily  Qty: 90 tablet, Refills: 3    Associated Diagnoses: Chronic systolic heart failure (H)      furosemide (LASIX) 20 MG tablet Take 1 tablet (20 mg) by mouth daily  Qty: 90 tablet, Refills: 3    Associated Diagnoses: Ischemic cardiomyopathy      hydrocortisone (CORTIZONE-10) 1 % ointment Apply topically 2 times daily as needed for itching      !! melatonin 3 MG tablet Take 1 tablet (3 mg) by mouth every evening    Associated Diagnoses: Chronic systolic heart failure (H)      !! melatonin 5 MG tablet Take 5 mg by mouth At Bedtime      metoprolol succinate ER (TOPROL-XL) 100 MG 24 hr tablet Take 1 tablet (100 mg) by mouth daily  Qty: 90 tablet, Refills: 3    Associated Diagnoses: Chronic systolic heart failure (H)      sodium chloride (EQ SALINE NASAL SPRAY) 0.65 % nasal spray Spray 1 spray into both nostrils daily as needed for congestion  Qty: 1 Bottle, Refills: 1      thin (NO BRAND SPECIFIED) lancets 1 each 4 times daily Use with lanceting device.  Qty: 100 each, Refills: 1    Associated Diagnoses: Type 2 diabetes mellitus with stage 3 chronic kidney disease, unspecified whether long term insulin use      ULTICARE SHORT 31G X 8 MM insulin pen needle       nitroglycerin (NITROSTAT) 0.4 MG sublingual tablet For chest pain place 1 tablet under the tongue every 5 minutes for 3 doses. If symptoms persist 5 minutes after 1st dose call 911.  Qty: 25 tablet    Associated Diagnoses: Atrial flutter,  unspecified type (H)      !! order for DME Equipment to be ordered:  Scale- Qty 1  Commander Flex - Qty. 1  Pulse-Oximeter - Qty. 1  Use as directed  Qty: 1 Units, Refills: 0    Associated Diagnoses: Chronic systolic heart failure (H)      !! 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 - Potential duplicate medications found. Please discuss with provider.      STOP taking these medications       lisinopril (ZESTRIL) 5 MG tablet Comments:   Reason for Stopping:         Magnesium Oxide 250 MG TABS Comments:   Reason for Stopping:         metFORMIN (GLUCOPHAGE-XR) 750 MG 24 hr tablet Comments:   Reason for Stopping:         nystatin (MYCOSTATIN) 122976 UNIT/GM external cream Comments:   Reason for Stopping:         venetoclax (VENCLEXTA) 100 MG tablet Comments:   Reason for Stopping:             Allergies   Allergies   Allergen Reactions     Blood Transfusion Related (Informational Only) Other (See Comments)     Patient has a history of a clinically significant antibody against RBC antigens.  A delay in compatible RBCs may occur.      Blood-Group Specific Substance Other (See Comments)     Patient has a suggestive Warm Auto-antibody. Blood products may be delayed. Draw patient 24 hours prior to transfusion. Draw one red top and two purple top tubes for all type and screen orders.     Data   Most Recent 3 CBC's:  Recent Labs   Lab Test 06/26/21  0618 06/24/21  0535 06/23/21  0535   WBC 3.7* 3.1* 2.9*   HGB 9.6* 9.2* 9.6*   MCV 93 94 95   * 102* 102*      Most Recent 3 BMP's:  Recent Labs   Lab Test 06/28/21  0610 06/27/21  0559 06/26/21  1401    136 137   POTASSIUM 4.1  3.9 4.1   CHLORIDE 109 108 108   CO2 24 22 25   BUN 43* 36* 35*   CR 1.33* 1.28* 1.26*   ANIONGAP 4 6 4   JUSTINO 9.4 9.3 9.4   * 221* 173*     Most Recent 2 LFT's:  Recent Labs   Lab Test 06/20/21  0707 06/19/21  2237   AST 34 32   ALT 24 26   ALKPHOS 72 84   BILITOTAL 0.9 1.1     Most Recent INR's and Anticoagulation Dosing History:  Anticoagulation Dose History     Recent Dosing and Labs Latest Ref Rng & Units 4/24/2020 5/18/2020 8/27/2020 11/3/2020 1/8/2021 2/16/2021 4/16/2021    INR 0.86 - 1.14 - 2.47(H) - - - - 1.05    INR Point of Care 0.86 - 1.14 2.9(H) - 3.0(H) 2.4(H) 3.8(H) 3.6(H) -        Most Recent 3 Troponin's:  Recent Labs   Lab Test 06/20/21  1209 06/20/21  0707 06/20/21  0336   TROPI 3.629* 5.095* 5.523*     Most Recent Cholesterol Panel:  Recent Labs   Lab Test 06/21/21  0522   CHOL 116   LDL 49   HDL 38*   TRIG 145     Most Recent 6 Bacteria Isolates From Any Culture (See EPIC Reports for Culture Details):  Recent Labs   Lab Test 09/30/18  2245 09/30/18  2240 02/09/17  2329 02/09/17  2206 02/09/17  0334 02/09/17  0153   CULT No growth No growth No growth No growth Moderate growth Anaerobic Gram positive cocci including Peptostreptococcus,   Peptoniphilus, Anaerococcus, Finegoldia, and Parvimonas species.  Susceptibility   testing not routinely done  *  Moderate growth Streptococcus mitis group  These bacteria are part of normal skin himanshu, but on occasion, may be true   pathogens.  Clinical correlation must be applied to interpreting this   microbiology result.  * On day 2, isolated in broth only: Streptococcus mitis group not isolated or   reported on routine culture Susceptibility testing done on previous specimen  *  No growth     Most Recent TSH, T4 and A1c Labs:  Recent Labs   Lab Test 06/20/21  0707 10/03/18  0905 10/03/18  0905   TSH  --   --  1.11   A1C 9.0*   < >  --     < > = values in this interval not displayed.     Results for orders placed or performed during the hospital  encounter of 21   CT Head w/o Contrast    Narrative    EXAM: CT HEAD W/O CONTRAST  LOCATION: [facility]  DATE/TIME: 2021 12:12 AM    INDICATION: Head trauma, minor (Age >= 65y)  COMPARISON: 2018  TECHNIQUE: Routine CT Head without IV contrast. Multiplanar reformats. Dose reduction techniques were used.    FINDINGS:  INTRACRANIAL CONTENTS: No intracranial hemorrhage, extraaxial collection, or mass effect.  No CT evidence of acute infarct. Mild to moderate presumed chronic small vessel ischemic changes. Mild to moderate generalized volume loss. No hydrocephalus.     VISUALIZED ORBITS/SINUSES/MASTOIDS: No intraorbital abnormality. No paranasal sinus mucosal disease. No middle ear or mastoid effusion.    BONES/SOFT TISSUES: No acute abnormality.      Impression    IMPRESSION:  1.  No acute intracranial hemorrhage or calvarial fracture.  2.  Mild to moderate volume loss and presumed chronic small vessel ischemic changes.   XR Chest 2 Views    Narrative    EXAM: XR CHEST 2 VW  LOCATION: [facility]  DATE/TIME: 2021 12:27 AM    INDICATION: ; confusion, ACS;  COMPARISON: 10/04/2018      Impression    IMPRESSION: No evidence for CHF or pneumonia. Upper normal heart size. Prior sternotomy and CABG procedure. No pleural effusion or pneumothorax. Extensive mid and lower thoracic spinal fusion.   Echocardiogram Complete    Narrative    [id number]  [id number]  [id number]  [id number]     [facility]  Echocardiography Laboratory  [address] [place] [address]     Name: PETER BUTLER  MRN: [id number]  : 1940  Study Date: 2021 01:09 AM  Age: 80 yrs  Gender: Male  Patient Location: [facility]  Reason For Study: Acute Myocardial Infarction  Ordering Physician: HAKEEM IZAGUIRRE  Referring Physician: Gene Guevara  Performed By: Hakeem Toledo RDCS     BSA: 1.9 m2  Height: 70 in  Weight: 168 lb  HR: 100  BP: 160/84  mmHg  ______________________________________________________________________________  Procedure  Complete Portable Echo Adult. Optison (NDC #[id number]) given intravenously.  ______________________________________________________________________________  Interpretation Summary     1. Left ventricular systolic function is severely reduced. The visual ejection  fraction is estimated at 20-25%.  2. The right ventricle is normal in structure, function and size.  3. The left atrium is moderately dilated.  4. The aortic valve is trileaflet with aortic valve sclerosis.  5. There is mild (1+) mitral regurgitation.  ______________________________________________________________________________  Left Ventricle  The left ventricle is moderately dilated. The visual ejection fraction is  estimated at 20-25%. Left ventricular systolic function is severely reduced.  Diastolic Doppler findings (E/E' ratio and/or other parameters) suggest left  ventricular filling pressures are indeterminate. There is severe global  hypokinesia of the left ventricle.     Right Ventricle  The right ventricle is normal in structure, function and size.     Atria  The left atrium is moderately dilated. Right atrial size is normal.     Mitral Valve  The mitral valve is normal in structure and function. There is mild (1+)  mitral regurgitation.     Tricuspid Valve  The tricuspid valve is normal in structure and function. There is trace  tricuspid regurgitation.     Aortic Valve  The aortic valve is trileaflet with aortic valve sclerosis. There is trace to  mild aortic regurgitation.     Pulmonic Valve  The pulmonic valve is normal in structure and function. There is mild (1+)  pulmonic valvular regurgitation.     Vessels  The aortic root is normal size. IVC diameter <2.1 cm collapsing >50% with  sniff suggests a normal RA pressure of 3 mmHg.     Pericardium  There is no pericardial effusion.      ______________________________________________________________________________  MMode/2D Measurements & Calculations  IVSd: 0.89 cm  LVIDd: 6.6 cm  LVIDs: 6.4 cm  LVPWd: 0.97 cm  FS: 2.5 %  LV mass(C)d: 262.2 grams  LV mass(C)dI: 135.4 grams/m2     Ao root diam: 3.3 cm  LA dimension: 4.0 cm  asc Aorta Diam: 3.1 cm  LA/Ao: 1.2  LA Volume (BP): 93.6 ml  LA Volume Index (BP): 48.2 ml/m2  RWT: 0.30     Doppler Measurements & Calculations  MV E max alessia: 116.0 cm/sec  MV A max alessia: 0.00 cm/sec  MV dec slope: 1029 cm/sec2     PA acc time: 0.10 sec  PI end-d alessia: 184.6 cm/sec  TR max alessia: 224.1 cm/sec  TR max P.1 mmHg  E/E' av.5  Lateral E/e': 11.0  Medial E/e': 14.0     ______________________________________________________________________________  Report approved by: Aggie Herrera 2021 03:04 AM         Cardiac Catheterization    Narrative      Dist LAD lesion is 40% stenosed.    Mid LM to Prox LAD lesion is 90% stenosed.    Mid LAD lesion is 100% stenosed.    Ost Cx to Dist Cx lesion is 100% stenosed.    Prox Graft lesion is 70% stenosed.    1st Diag lesion is 80% stenosed.    Lat 1st Diag lesion is 90% stenosed.     1.  Severe multivessel CAD s/p CABG.   LM has severe stenosis.   LAD has sever proximal stenosis and midLAD is occluded. Distal LAD fills   via patent LIMA.   LCx is occluded.    RCA has moderate diffuse disease.  2.  LIMA to LAD is patent.  Mid to distal LAD stents are patent.  3.  SVG to OM is patent.  4.  SVG to Diagonal is patent with a 70% stenosis and supplies diagonal   branches are small and diffusely diseased.

## 2021-06-28 NOTE — PLAN OF CARE
Pt a/ox4. VSS on RA. Tele: SR with BBB. Denied pain. LS dim. BS actie, +flatus. Voiding via bedside urinal.- does independently. Had smear BM this evening. Up with assist 1 gait belt and walker. Has right BKA. Turn/repo. Blanchable/redness to coccyx-buttocks. Blood sugar checks. Evening blood sugar 248- covered with insulin. Discharge pending TCU placement.

## 2021-06-29 ENCOUNTER — OFFICE VISIT - HEALTHEAST (OUTPATIENT)
Dept: GERIATRICS | Facility: CLINIC | Age: 81
End: 2021-06-29

## 2021-06-29 DIAGNOSIS — E11.22 TYPE 2 DIABETES MELLITUS WITH STAGE 3A CHRONIC KIDNEY DISEASE, WITH LONG-TERM CURRENT USE OF INSULIN (H): ICD-10-CM

## 2021-06-29 DIAGNOSIS — I48.91 ATRIAL FIBRILLATION, UNSPECIFIED TYPE (H): ICD-10-CM

## 2021-06-29 DIAGNOSIS — I50.22 CHRONIC SYSTOLIC HEART FAILURE (H): ICD-10-CM

## 2021-06-29 DIAGNOSIS — N18.31 TYPE 2 DIABETES MELLITUS WITH STAGE 3A CHRONIC KIDNEY DISEASE, WITH LONG-TERM CURRENT USE OF INSULIN (H): ICD-10-CM

## 2021-06-29 DIAGNOSIS — R29.6 RECURRENT FALLS: ICD-10-CM

## 2021-06-29 DIAGNOSIS — E78.5 HYPERLIPIDEMIA WITH TARGET LDL LESS THAN 100: ICD-10-CM

## 2021-06-29 DIAGNOSIS — I10 ESSENTIAL HYPERTENSION: ICD-10-CM

## 2021-06-29 DIAGNOSIS — Z79.4 TYPE 2 DIABETES MELLITUS WITH STAGE 3A CHRONIC KIDNEY DISEASE, WITH LONG-TERM CURRENT USE OF INSULIN (H): ICD-10-CM

## 2021-06-29 DIAGNOSIS — C91.10 CHRONIC LYMPHOCYTIC LEUKEMIA (H): ICD-10-CM

## 2021-06-30 LAB
ANION GAP SERPL CALCULATED.3IONS-SCNC: 10 MMOL/L (ref 5–18)
BUN SERPL-MCNC: 42 MG/DL (ref 8–28)
CALCIUM SERPL-MCNC: 8.9 MG/DL (ref 8.5–10.5)
CHLORIDE BLD-SCNC: 109 MMOL/L (ref 98–107)
CO2 SERPL-SCNC: 20 MMOL/L (ref 22–31)
CREAT SERPL-MCNC: 1.63 MG/DL (ref 0.7–1.3)
GFR SERPL CREATININE-BSD FRML MDRD: 41 ML/MIN/1.73M2
GLUCOSE BLD-MCNC: 120 MG/DL (ref 70–125)
POTASSIUM BLD-SCNC: 3.9 MMOL/L (ref 3.5–5)
SODIUM SERPL-SCNC: 139 MMOL/L (ref 136–145)

## 2021-07-06 ENCOUNTER — COMMUNICATION - HEALTHEAST (OUTPATIENT)
Dept: GERIATRICS | Facility: CLINIC | Age: 81
End: 2021-07-06

## 2021-07-06 VITALS
RESPIRATION RATE: 18 BRPM | DIASTOLIC BLOOD PRESSURE: 53 MMHG | OXYGEN SATURATION: 93 % | TEMPERATURE: 98.2 F | SYSTOLIC BLOOD PRESSURE: 106 MMHG | HEART RATE: 59 BPM

## 2021-07-06 NOTE — TELEPHONE ENCOUNTER
Telephone Encounter by Ronaldo Ace RN at 7/6/2021  4:32 PM     Author: Ronaldo Ace RN Service: -- Author Type: Registered Nurse    Filed: 7/6/2021  4:34 PM Encounter Date: 7/6/2021 Status: Signed    : Ronaldo Ace RN (Registered Nurse)       [facility] Nurse Triage Telephone Note      Provider: PARESH Padron  Facility: [facility] Type: TCU    Caller: Dai  Call Back Number:  [phone]    Allergies: Blood-group specific substance    Reason for call: Nurse calling to report 2 loose stools today and 3 loose stools yesterday.  No fever or abdominal pain, but stools are foul smelling.  There are known cases of C-diff on the nursing unit.       Verbal Order/Direction given by Provider: Obtain a stool sample to check for C-diff.      Provider giving order: PARESH Padron    Verbal order given to: Luisa Ace RN

## 2021-07-07 ENCOUNTER — RECORDS - HEALTHEAST (OUTPATIENT)
Dept: LAB | Facility: CLINIC | Age: 81
End: 2021-07-07

## 2021-07-07 RX ORDER — VENETOCLAX 10-50-100
KIT ORAL
COMMUNITY
Start: 2021-04-07 | End: 2021-09-22

## 2021-07-07 RX ORDER — ASPIRIN 81 MG/1
81 TABLET, CHEWABLE ORAL DAILY
COMMUNITY
Start: 2021-03-09 | End: 2022-03-24 | Stop reason: ALTCHOICE

## 2021-07-07 RX ORDER — MULTIVITAMIN WITH IRON
2 TABLET ORAL DAILY
COMMUNITY
Start: 2021-05-23 | End: 2021-09-22

## 2021-07-07 NOTE — LETTER
Letter by Nevin Meza CNP at      Author: Nevin Meza CNP Service: -- Author Type: --    Filed:  Encounter Date: 6/29/2021 Status: (Other)         [facility] [address] [address]                                  June 29, 2021    Patient: Tad Manning   MR Number: [id number]   YOB: 1940   Date of Visit: 6/29/2021     Dear Dr. Hernandez:    Thank you for referring Tad Manning to me for evaluation. Below are the relevant portions of my assessment and plan of care.    If you have questions, please do not hesitate to call me. I look forward to following Tad along with you.    Sincerely,        Nevin Meza CNP          CC  No Recipients  Nevin Meza CNP  6/29/2021 12:25 PM  Signed  Code Status:  FULL CODE  Visit Type: Hospital Visit Follow Up (metabolic encephalopathy, bradycardia, DM)     Facility:  [facility] [[id number]]      Facility Type: SNF (Skilled Nursing Facility, TCU)    History of Present Illness:   Hospital Admission Date: 6/19/2021 Hospital Discharge Date: 6/28/2021       Tad Manning is a 80 y.o. male with a past medical history for ischemic cardiomyopathy, CLL, PAD, status post right BKA, paroxysmal atrial fibrillation, hypertension, dyslipidemia, type 2 diabetes, chronic anemia with thrombocytopenia, CKD, BPH, depression, GERD.  He was recently hospitalized after recurrent falls at home with generalized weakness.  He was found to have worsening ischemic cardiomyopathy with an EF of 20-25%.  He was also found to have acute metabolic encephalopathy which resolved with treatment.  It was noted that he had recently resumed chemotherapy for his CLL which could also have been playing a role in his weakness.  UA and x-rays were negative for signs of any infection and there was no neurological deficits suggesting stroke.  It was suggested that he had underlying undiagnosed dementia.  BNP was  elevated at 7364.  There is question of his compliance with his medications at home.      He will need follow-up with cardiology in 1 week,also warranting a cardiac event monitor.  Cardiology did stop his home dose of lisinopril and put on Entresto and continue with home dose Toprol-XL.    He will need oncology follow-up after TCU stay.    Chronic pseudohyponatremia which corrects to about 136.  Thought to be related to high blood sugars.  Unsure of good control of diabetes at home.  Continued on Lantus and Metformin and was started on NovoLog 3 times daily.  Question if he could be started on an SGLT2 inhibitor which was deferred to primary care.    Discharge creatinine was at baseline at 1.4.    Home dose of trazodone was decreased to 50 mg from 150 mg due to falls and increased confusion.    Today, patient denies any pain or discomforts.  Cognition appears to be baseline however does appear to be impaired.  States he has a roommate who manages his medications at home.     Past Medical History:   Diagnosis Date   ? ACS (acute coronary syndrome) (H) 6/20/2021   ? Acute respiratory failure with hypoxia (H) 4/6/2017   ? STORM (acute kidney injury) (H) 9/22/2018   ? Atrial fibrillation (H) 2/5/2018   ? Atrial flutter (H) 2/22/2017   ? CAD S/P percutaneous coronary angioplasty 1/11/2014   ? Chronic systolic heart failure (H) 6/6/2017   ? Confusion 6/20/2021   ? CRF (chronic renal failure), stage 3 (moderate) 1/20/2017   ? Critical lower limb ischemia 1/10/2014   ? Diabetes mellitus, type 2 (H) 1/11/2014   ? Discitis 2/8/2017   ? 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 Fall 2/7/2017   ? HTN (hypertension) 1/11/2014   ? Hyperglycemia 1/9/2020   ? Hyperlipidemia with target LDL less than 100 1/11/2014    Formatting of this note might be different from the original. Overview:  Diagnosis updated by automated process. Provider to review and confirm. Formatting of this note might be different from the original. Diagnosis updated by  automated process. Provider to review and confirm.   ? Long term current use of anticoagulant therapy 2018   ? Osteomyelitis (H) 2014   ? Osteomyelitis of foot, right, acute (H) 2/15/2017   ? Recurrent falls 2021     Past Surgical History:   Procedure Laterality Date   ? AMPUTATE LEG BELOW KNEE  10/08/2018   ? AMPUTATE REVISION STUMP LOWER EXTREMITY  10/11/2018   ? APPLY WOUND VAC  10/08/2018   ? CV ANGIOGRAM CORONARY GRAFT  2021   ? CV HEART CATH   2021   ? OPTICAL TRACKING SYSTEM FUSION POSTERIOR SPINE THORACIC THREE + LEVELS  2017     Family History   Problem Relation Age of Onset   ? Leukemia Mother      Social History     Socioeconomic History   ? Marital status: Single     Spouse name: Not on file   ? Number of children: Not on file   ? Years of education: Not on file   ? Highest education level: Not on file   Occupational History   ? Not on file   Social Needs   ? Financial resource strain: Not on file   ? Food insecurity     Worry: Not on file     Inability: Not on file   ? Transportation needs     Medical: Not on file     Non-medical: Not on file   Tobacco Use   ? Smoking status: Former Smoker     Quit date:      Years since quittin.5   ? Smokeless tobacco: Never Used   Substance and Sexual Activity   ? Alcohol use: Not on file   ? Drug use: Not on file   ? Sexual activity: Not on file   Lifestyle   ? Physical activity     Days per week: Not on file     Minutes per session: Not on file   ? Stress: Not on file   Relationships   ? Social connections     Talks on phone: Not on file     Gets together: Not on file     Attends Restorationist service: Not on file     Active member of club or organization: Not on file     Attends meetings of clubs or organizations: Not on file     Relationship status: Not on file   ? Intimate partner violence     Fear of current or ex partner: Not on file     Emotionally abused: Not on file     Physically abused: Not on file     Forced sexual  activity: Not on file   Other Topics Concern   ? Not on file   Social History Narrative   ? Not on file       Current Outpatient Medications   Medication Sig Dispense Refill   ? allopurinoL (ZYLOPRIM) 300 MG tablet Take 300 mg by mouth daily.     ? apixaban ANTICOAGULANT (ELIQUIS) 5 mg Tab tablet Take 5 mg by mouth 2 (two) times a day.     ? aspirin 81 mg chewable tablet Chew 81 mg daily.     ? atorvastatin (LIPITOR) 20 MG tablet Take 20 mg by mouth at bedtime.     ? cholecalciferol, vitamin D3, 25 mcg (1,000 unit) capsule Take 1,000 Units by mouth daily.     ? citalopram (CELEXA) 20 MG tablet Take 20 mg by mouth daily.     ? digoxin (LANOXIN) 125 mcg (0.125 mg) tablet Take 125 mcg by mouth daily.     ? ferrous sulfate 324 mg (65 mg iron) TbEC Take 324 mg by mouth daily.     ? furosemide (LASIX) 20 MG tablet Take 20 mg by mouth daily.     ? hydrocortisone 1 % ointment Apply topically 2 (two) times a day as needed.     ? insulin aspart U-100 (NOVOLOG) 100 unit/mL (3 mL) injection pen Inject 12 Units under the skin daily with supper. Also 9 units subcutaneous with breakfast  Also 11 units subcutaneous with lunch     ? insulin glargine (LANTUS SOLOSTAR PEN) 100 unit/mL (3 mL) pen Inject 30 Units under the skin at bedtime.     ? melatonin 3 mg Tab tablet Take 3 mg by mouth daily.     ? metoprolol succinate (TOPROL-XL) 100 MG 24 hr tablet Take 100 mg by mouth daily.     ? nitroglycerin (NITROSTAT) 0.4 MG SL tablet Place 0.4 mg under the tongue every 5 (five) minutes as needed for chest pain.     ? nystatin (MYCOSTATIN) powder Apply topically 2 (two) times a day. Also prn     ? pantoprazole (PROTONIX) 40 MG tablet Take 40 mg by mouth daily.     ? polyethylene glycol (GLYCOLAX) 17 gram/dose powder Take 1 packet by mouth daily as needed.     ? sacubitriL-valsartan (ENTRESTO) 24-26 mg Tab tablet Take 1 tablet by mouth 2 (two) times a day.     ? sodium chloride (OCEAN) 0.65 % nasal spray Apply 1 spray into each nostril daily  as needed for congestion.     ? traZODone (DESYREL) 50 MG tablet Take 50 mg by mouth daily as needed for sleep.       No current facility-administered medications for this visit.      Allergies   Allergen Reactions   ? Blood-Group Specific Substance Other (See Comments)     Patient has a suggestive Warm Auto-antibody. Blood products may be delayed. Draw patient 24 hours prior to transfusion. Draw one red top and two purple top tubes for all type and screen orders.  Patient has a suggestive Warm Auto-antibody. Blood products may be delayed. Draw patient 24 hours prior to transfusion. Draw one red top and two purple top tubes for all type and screen orders.       Immunization History   Administered Date(s) Administered   ? Hep A, Adult IM (19yr & older) 11/03/2008   ? INFLUENZA,SEASONAL QUAD, PF, =/> 6months 11/15/2015   ? Influenza high dose,seasonal,PF, 65+ yrs 11/29/2016, 11/20/2017, 09/24/2018   ? Pneumo Conj 13-V (2010&after) 12/21/2015   ? Pneumo Polysac 23-V 12/05/2011   ? Td, Adult, Absorbed 07/25/2003   ? ZOSTER, LIVE 12/05/2011       Post Discharge Medication Reconciliation Status: discharge medications reconciled, continue medications without change    Review of Systems   Patient denies fever, chills, headache, lightheadedness, dizziness, rhinorrhea, cough, congestion, shortness of breath, chest pain, palpitations, abdominal pain, n/v, diarrhea, constipation, change in appetite, dysuria, frequency, burning or pain with urination.  Other than stated in HPI all other review of systems is negative.         Physical Exam   Vitals:    06/29/21 1036   BP: 106/53   Pulse: (!) 59   Resp: 18   Temp: 98.2  F (36.8  C)   SpO2: 93%       GENERAL APPEARANCE: Well developed, well nourished, in no acute distress.  HEENT: normocephalic, atraumatic  PERRL, sclerae anicteric, conjunctivae clear and moist, EOM intact  LUNGS: Lung sounds CTA, no adventitious sounds, respiratory effort normal.  CARD: RRR, S1, S2, without  murmurs, gallops, rubs  ABD: Soft and nontender with normal bowel sounds.   MSK: Muscle strength and tone were equal bilaterally  EXTREMITIES: Rt BKA, no BLE edema,   NEURO: Alert with cognitive impairment. Face is symmetric.  SKIN: red flat rash in groin bilaterally  PSYCH: euthymic            Labs:    Recent Results (from the past 240 hour(s))   SARS-COV-2 (COVID-19)-PCR    Specimen: Nasopharyngeal   Result Value Ref Range    SARS-CoV-2 Virus Specimen Source Nasopharyngeal     SARS-CoV-2 PCR Result NEGATIVE     SARS-COV-2 PCR COMMENT (Note)          Assessment:  1. Atrial fibrillation, unspecified type (H)     2. Type 2 diabetes mellitus with stage 3a chronic kidney disease, with long-term current use of insulin (H)     3. Chronic systolic heart failure (H)     4. Essential hypertension     5. Hyperlipidemia with target LDL less than 100     6. Chronic lymphocytic leukemia (H)     7. Recurrent falls         Plan:   Afib: rate controlled, today HR low so added hold parameters to digoxin.  Level was slightly low and so it was thought he was not taking it adequately at home.  On Eliquis    Diabetes: Blood sugars have only been checked twice since his admission however in good range, currently continue Lantus, Metformin and NovoLog.    Heart failure: Compensated, check weights daily, continue with Lasix is on beta-blocker.  Lisinopril was discontinued in the hospital and he was started on Entresto per cardiology.  He will need follow-up with cardiology in the next week or so.    Hypertension: Blood pressure soft, will not place hold parameters on metoprolol at this time due to needing cardiac output will need to continue to monitor closely and determine if he has hypotension.    Hyper lipidemia: Lipids drawn in the hospital were in good range continue with home dose of atorvastatin.    CLL: Rituximab on hold at this time will need follow-up with oncology after rehab to determine if resuming treatment.    Recurrent  falls: Multifactorial weakness and question if there is cognitive impairment.  Will have OT evaluate cognition may need higher level of care at discharge.        Electronically signed by: Nevin Meza CNP

## 2021-07-07 NOTE — PROGRESS NOTES
Code Status:  FULL CODE  Visit Type: Hospital Visit Follow Up (metabolic encephalopathy, bradycardia, DM)     Facility:  [facility] [[id number]]      Facility Type: SNF (Skilled Nursing Facility, TCU)    History of Present Illness:   Hospital Admission Date: 6/19/2021 Hospital Discharge Date: 6/28/2021       Tad Manning is a 80 y.o. male with a past medical history for ischemic cardiomyopathy, CLL, PAD, status post right BKA, paroxysmal atrial fibrillation, hypertension, dyslipidemia, type 2 diabetes, chronic anemia with thrombocytopenia, CKD, BPH, depression, GERD.  He was recently hospitalized after recurrent falls at home with generalized weakness.  He was found to have worsening ischemic cardiomyopathy with an EF of 20-25%.  He was also found to have acute metabolic encephalopathy which resolved with treatment.  It was noted that he had recently resumed chemotherapy for his CLL which could also have been playing a role in his weakness.  UA and x-rays were negative for signs of any infection and there was no neurological deficits suggesting stroke.  It was suggested that he had underlying undiagnosed dementia.  BNP was elevated at 7364.  There is question of his compliance with his medications at home.      He will need follow-up with cardiology in 1 week,also warranting a cardiac event monitor.  Cardiology did stop his home dose of lisinopril and put on Entresto and continue with home dose Toprol-XL.    He will need oncology follow-up after TCU stay.    Chronic pseudohyponatremia which corrects to about 136.  Thought to be related to high blood sugars.  Unsure of good control of diabetes at home.  Continued on Lantus and Metformin and was started on NovoLog 3 times daily.  Question if he could be started on an SGLT2 inhibitor which was deferred to primary care.    Discharge creatinine was at baseline at 1.4.    Home dose of trazodone was decreased to 50 mg from 150 mg due to falls and  increased confusion.    Today, patient denies any pain or discomforts.  Cognition appears to be baseline however does appear to be impaired.  States he has a roommate who manages his medications at home.     Past Medical History:   Diagnosis Date     ACS (acute coronary syndrome) (H) 6/20/2021     Acute respiratory failure with hypoxia (H) 4/6/2017     STORM (acute kidney injury) (H) 9/22/2018     Atrial fibrillation (H) 2/5/2018     Atrial flutter (H) 2/22/2017     CAD S/P percutaneous coronary angioplasty 1/11/2014     Chronic systolic heart failure (H) 6/6/2017     Confusion 6/20/2021     CRF (chronic renal failure), stage 3 (moderate) 1/20/2017     Critical lower limb ischemia 1/10/2014     Diabetes mellitus, type 2 (H) 1/11/2014     Discitis 2/8/2017     Epidural abscess 2/9/2017     Fall 2/7/2017     HTN (hypertension) 1/11/2014     Hyperglycemia 1/9/2020     Hyperlipidemia with target LDL less than 100 1/11/2014    Formatting of this note might be different from the original. Overview:  Diagnosis updated by automated process. Provider to review and confirm. Formatting of this note might be different from the original. Diagnosis updated by automated process. Provider to review and confirm.     Long term current use of anticoagulant therapy 2/5/2018     Osteomyelitis (H) 1/11/2014     Osteomyelitis of foot, right, acute (H) 2/15/2017     Recurrent falls 6/20/2021     Past Surgical History:   Procedure Laterality Date     AMPUTATE LEG BELOW KNEE  10/08/2018     AMPUTATE REVISION STUMP LOWER EXTREMITY  10/11/2018     APPLY WOUND VAC  10/08/2018     CV ANGIOGRAM CORONARY GRAFT  06/22/2021     CV HEART CATH   06/22/2021     OPTICAL TRACKING SYSTEM FUSION POSTERIOR SPINE THORACIC THREE + LEVELS  02/12/2017     Family History   Problem Relation Age of Onset     Leukemia Mother      Social History     Socioeconomic History     Marital status: Single     Spouse name: Not on file     Number of children: Not on file      Years of education: Not on file     Highest education level: Not on file   Occupational History     Not on file   Social Needs     Financial resource strain: Not on file     Food insecurity     Worry: Not on file     Inability: Not on file     Transportation needs     Medical: Not on file     Non-medical: Not on file   Tobacco Use     Smoking status: Former Smoker     Quit date:      Years since quittin.5     Smokeless tobacco: Never Used   Substance and Sexual Activity     Alcohol use: Not on file     Drug use: Not on file     Sexual activity: Not on file   Lifestyle     Physical activity     Days per week: Not on file     Minutes per session: Not on file     Stress: Not on file   Relationships     Social connections     Talks on phone: Not on file     Gets together: Not on file     Attends Scientologist service: Not on file     Active member of club or organization: Not on file     Attends meetings of clubs or organizations: Not on file     Relationship status: Not on file     Intimate partner violence     Fear of current or ex partner: Not on file     Emotionally abused: Not on file     Physically abused: Not on file     Forced sexual activity: Not on file   Other Topics Concern     Not on file   Social History Narrative     Not on file       Current Outpatient Medications   Medication Sig Dispense Refill     allopurinoL (ZYLOPRIM) 300 MG tablet Take 300 mg by mouth daily.       apixaban ANTICOAGULANT (ELIQUIS) 5 mg Tab tablet Take 5 mg by mouth 2 (two) times a day.       aspirin 81 mg chewable tablet Chew 81 mg daily.       atorvastatin (LIPITOR) 20 MG tablet Take 20 mg by mouth at bedtime.       cholecalciferol, vitamin D3, 25 mcg (1,000 unit) capsule Take 1,000 Units by mouth daily.       citalopram (CELEXA) 20 MG tablet Take 20 mg by mouth daily.       digoxin (LANOXIN) 125 mcg (0.125 mg) tablet Take 125 mcg by mouth daily.       ferrous sulfate 324 mg (65 mg iron) TbEC Take 324 mg by mouth daily.        furosemide (LASIX) 20 MG tablet Take 20 mg by mouth daily.       hydrocortisone 1 % ointment Apply topically 2 (two) times a day as needed.       insulin aspart U-100 (NOVOLOG) 100 unit/mL (3 mL) injection pen Inject 12 Units under the skin daily with supper. Also 9 units subcutaneous with breakfast  Also 11 units subcutaneous with lunch       insulin glargine (LANTUS SOLOSTAR PEN) 100 unit/mL (3 mL) pen Inject 30 Units under the skin at bedtime.       melatonin 3 mg Tab tablet Take 3 mg by mouth daily.       metoprolol succinate (TOPROL-XL) 100 MG 24 hr tablet Take 100 mg by mouth daily.       nitroglycerin (NITROSTAT) 0.4 MG SL tablet Place 0.4 mg under the tongue every 5 (five) minutes as needed for chest pain.       nystatin (MYCOSTATIN) powder Apply topically 2 (two) times a day. Also prn       pantoprazole (PROTONIX) 40 MG tablet Take 40 mg by mouth daily.       polyethylene glycol (GLYCOLAX) 17 gram/dose powder Take 1 packet by mouth daily as needed.       sacubitriL-valsartan (ENTRESTO) 24-26 mg Tab tablet Take 1 tablet by mouth 2 (two) times a day.       sodium chloride (OCEAN) 0.65 % nasal spray Apply 1 spray into each nostril daily as needed for congestion.       traZODone (DESYREL) 50 MG tablet Take 50 mg by mouth daily as needed for sleep.       No current facility-administered medications for this visit.      Allergies   Allergen Reactions     Blood-Group Specific Substance Other (See Comments)     Patient has a suggestive Warm Auto-antibody. Blood products may be delayed. Draw patient 24 hours prior to transfusion. Draw one red top and two purple top tubes for all type and screen orders.  Patient has a suggestive Warm Auto-antibody. Blood products may be delayed. Draw patient 24 hours prior to transfusion. Draw one red top and two purple top tubes for all type and screen orders.       Immunization History   Administered Date(s) Administered     Hep A, Adult IM (19yr & older) 11/03/2008      INFLUENZA,SEASONAL QUAD, PF, =/> 6months 11/15/2015     Influenza high dose,seasonal,PF, 65+ yrs 11/29/2016, 11/20/2017, 09/24/2018     Pneumo Conj 13-V (2010&after) 12/21/2015     Pneumo Polysac 23-V 12/05/2011     Td, Adult, Absorbed 07/25/2003     ZOSTER, LIVE 12/05/2011       Post Discharge Medication Reconciliation Status: discharge medications reconciled, continue medications without change    Review of Systems   Patient denies fever, chills, headache, lightheadedness, dizziness, rhinorrhea, cough, congestion, shortness of breath, chest pain, palpitations, abdominal pain, n/v, diarrhea, constipation, change in appetite, dysuria, frequency, burning or pain with urination.  Other than stated in HPI all other review of systems is negative.         Physical Exam   Vitals:    06/29/21 1036   BP: 106/53   Pulse: (!) 59   Resp: 18   Temp: 98.2  F (36.8  C)   SpO2: 93%       GENERAL APPEARANCE: Well developed, well nourished, in no acute distress.  HEENT: normocephalic, atraumatic  PERRL, sclerae anicteric, conjunctivae clear and moist, EOM intact  LUNGS: Lung sounds CTA, no adventitious sounds, respiratory effort normal.  CARD: RRR, S1, S2, without murmurs, gallops, rubs  ABD: Soft and nontender with normal bowel sounds.   MSK: Muscle strength and tone were equal bilaterally  EXTREMITIES: Rt BKA, no BLE edema,   NEURO: Alert with cognitive impairment. Face is symmetric.  SKIN: red flat rash in groin bilaterally  PSYCH: euthymic            Labs:    Recent Results (from the past 240 hour(s))   SARS-COV-2 (COVID-19)-PCR    Specimen: Nasopharyngeal   Result Value Ref Range    SARS-CoV-2 Virus Specimen Source Nasopharyngeal     SARS-CoV-2 PCR Result NEGATIVE     SARS-COV-2 PCR COMMENT (Note)          Assessment:  1. Atrial fibrillation, unspecified type (H)     2. Type 2 diabetes mellitus with stage 3a chronic kidney disease, with long-term current use of insulin (H)     3. Chronic systolic heart failure (H)     4.  Essential hypertension     5. Hyperlipidemia with target LDL less than 100     6. Chronic lymphocytic leukemia (H)     7. Recurrent falls         Plan:   Afib: rate controlled, today HR low so added hold parameters to digoxin.  Level was slightly low and so it was thought he was not taking it adequately at home.  On Eliquis    Diabetes: Blood sugars have only been checked twice since his admission however in good range, currently continue Lantus, Metformin and NovoLog.    Heart failure: Compensated, check weights daily, continue with Lasix is on beta-blocker.  Lisinopril was discontinued in the hospital and he was started on Entresto per cardiology.  He will need follow-up with cardiology in the next week or so.    Hypertension: Blood pressure soft, will not place hold parameters on metoprolol at this time due to needing cardiac output will need to continue to monitor closely and determine if he has hypotension.    Hyper lipidemia: Lipids drawn in the hospital were in good range continue with home dose of atorvastatin.    CLL: Rituximab on hold at this time will need follow-up with oncology after rehab to determine if resuming treatment.    Recurrent falls: Multifactorial weakness and question if there is cognitive impairment.  Will have OT evaluate cognition may need higher level of care at discharge.        Electronically signed by: Nevin Meza, CNP

## 2021-07-08 ENCOUNTER — RECORDS - HEALTHEAST (OUTPATIENT)
Dept: LAB | Facility: CLINIC | Age: 81
End: 2021-07-08

## 2021-07-08 ENCOUNTER — COMMUNICATION - HEALTHEAST (OUTPATIENT)
Dept: GERIATRICS | Facility: CLINIC | Age: 81
End: 2021-07-08

## 2021-07-08 LAB
ANION GAP SERPL CALCULATED.3IONS-SCNC: 8 MMOL/L (ref 5–18)
BUN SERPL-MCNC: 33 MG/DL (ref 8–28)
C DIFF TOX B STL QL: NEGATIVE
CALCIUM SERPL-MCNC: 9.4 MG/DL (ref 8.5–10.5)
CHLORIDE BLD-SCNC: 106 MMOL/L (ref 98–107)
CO2 SERPL-SCNC: 23 MMOL/L (ref 22–31)
CREAT SERPL-MCNC: 1.12 MG/DL (ref 0.7–1.3)
GFR SERPL CREATININE-BSD FRML MDRD: >60 ML/MIN/1.73M2
GLUCOSE BLD-MCNC: 120 MG/DL (ref 70–125)
POTASSIUM BLD-SCNC: 3.8 MMOL/L (ref 3.5–5)
RIBOTYPE 027/NAP1/BI: NORMAL
SODIUM SERPL-SCNC: 137 MMOL/L (ref 136–145)

## 2021-07-10 ENCOUNTER — COMMUNICATION - HEALTHEAST (OUTPATIENT)
Dept: GERIATRICS | Facility: CLINIC | Age: 81
End: 2021-07-10

## 2021-07-12 ENCOUNTER — LAB (OUTPATIENT)
Dept: LAB | Facility: CLINIC | Age: 81
End: 2021-07-12
Payer: COMMERCIAL

## 2021-07-12 ENCOUNTER — OFFICE VISIT (OUTPATIENT)
Dept: CARDIOLOGY | Facility: CLINIC | Age: 81
End: 2021-07-12
Attending: INTERNAL MEDICINE
Payer: COMMERCIAL

## 2021-07-12 VITALS
WEIGHT: 171 LBS | SYSTOLIC BLOOD PRESSURE: 160 MMHG | DIASTOLIC BLOOD PRESSURE: 72 MMHG | OXYGEN SATURATION: 98 % | BODY MASS INDEX: 23.94 KG/M2 | HEART RATE: 83 BPM | HEIGHT: 71 IN

## 2021-07-12 DIAGNOSIS — I48.0 PAROXYSMAL A-FIB (H): ICD-10-CM

## 2021-07-12 DIAGNOSIS — I25.5 ISCHEMIC CARDIOMYOPATHY: ICD-10-CM

## 2021-07-12 DIAGNOSIS — N17.9 AKI (ACUTE KIDNEY INJURY) (H): ICD-10-CM

## 2021-07-12 DIAGNOSIS — I48.91 ATRIAL FIBRILLATION, UNSPECIFIED TYPE (H): ICD-10-CM

## 2021-07-12 DIAGNOSIS — I50.22 CHRONIC SYSTOLIC HEART FAILURE (H): Primary | ICD-10-CM

## 2021-07-12 LAB
ALBUMIN SERPL-MCNC: 3.5 G/DL (ref 3.4–5)
ALP SERPL-CCNC: 73 U/L (ref 40–150)
ALT SERPL W P-5'-P-CCNC: 26 U/L (ref 0–70)
ANION GAP SERPL CALCULATED.3IONS-SCNC: 7 MMOL/L (ref 3–14)
ANION GAP SERPL CALCULATED.3IONS-SCNC: 7 MMOL/L (ref 3–14)
AST SERPL W P-5'-P-CCNC: 16 U/L (ref 0–45)
BILIRUB SERPL-MCNC: 0.5 MG/DL (ref 0.2–1.3)
BUN SERPL-MCNC: 23 MG/DL (ref 7–30)
BUN SERPL-MCNC: 24 MG/DL (ref 7–30)
CALCIUM SERPL-MCNC: 9.3 MG/DL (ref 8.5–10.1)
CALCIUM SERPL-MCNC: 9.3 MG/DL (ref 8.5–10.1)
CHLORIDE BLD-SCNC: 109 MMOL/L (ref 94–109)
CHLORIDE BLD-SCNC: 109 MMOL/L (ref 94–109)
CO2 SERPL-SCNC: 22 MMOL/L (ref 20–32)
CO2 SERPL-SCNC: 22 MMOL/L (ref 20–32)
CREAT SERPL-MCNC: 1.11 MG/DL (ref 0.66–1.25)
CREAT SERPL-MCNC: 1.12 MG/DL (ref 0.66–1.25)
GFR SERPL CREATININE-BSD FRML MDRD: 63 ML/MIN/1.73M2
GFR SERPL CREATININE-BSD FRML MDRD: 63 ML/MIN/1.73M2
GLUCOSE BLD-MCNC: 274 MG/DL (ref 70–99)
GLUCOSE BLD-MCNC: 274 MG/DL (ref 70–99)
POTASSIUM BLD-SCNC: 4.2 MMOL/L (ref 3.4–5.3)
POTASSIUM BLD-SCNC: 4.2 MMOL/L (ref 3.4–5.3)
PROT SERPL-MCNC: 7.1 G/DL (ref 6.8–8.8)
SODIUM SERPL-SCNC: 138 MMOL/L (ref 133–144)
SODIUM SERPL-SCNC: 138 MMOL/L (ref 133–144)

## 2021-07-12 PROCEDURE — 36415 COLL VENOUS BLD VENIPUNCTURE: CPT | Performed by: PATHOLOGY

## 2021-07-12 PROCEDURE — 99214 OFFICE O/P EST MOD 30 MIN: CPT | Mod: GC | Performed by: INTERNAL MEDICINE

## 2021-07-12 PROCEDURE — 84295 ASSAY OF SERUM SODIUM: CPT | Performed by: PATHOLOGY

## 2021-07-12 PROCEDURE — 80053 COMPREHEN METABOLIC PANEL: CPT | Performed by: PATHOLOGY

## 2021-07-12 ASSESSMENT — PAIN SCALES - GENERAL: PAINLEVEL: NO PAIN (0)

## 2021-07-12 ASSESSMENT — MIFFLIN-ST. JEOR: SCORE: 1509.84

## 2021-07-12 NOTE — PATIENT INSTRUCTIONS
Cardiology Providers you saw during your visit:  Dr. Valdes    Medication changes: INCREASE Entresto to 49-51 mg Twice Daily    Follow up: Follow up with [facility] in 2 weeks with labs prior.    Labs: Draw before [facility] appointment.     Follow the American Heart Association Diet and Lifestyle recommendations:  Limit saturated fat, trans fat, sodium, red meat, sweets and sugar-sweetened beverages. If you choose to eat red meat, compare labels and select the leanest cuts available.  Aim for at least 150 minutes of moderate physical activity or 75 minutes of vigorous physical activity - or an equal combination of both - each week.      If you have any questions, call  Lazaro Collado RN, at [phone].  Press Option #1 for the [facility], and then press Option #4  We are encouraging the use of [employer] to communicate with your HealthCare Provider      After hours, weekends or holidays: On Call Cardiologist- [phone] option #4 and ask to speak to the on-call Cardiologist.     Lazaro Collado RN   Cardiology Nurse Coordinator

## 2021-07-12 NOTE — NURSING NOTE
Return Appointment:Follow up with [facility] in 2 weeks.     Medication Change: Increase Entresto dose to 49-51 mg BID.     Lazaro Collado, RN   Cardiology Nurse Coordinator

## 2021-07-12 NOTE — PROGRESS NOTES
HPI:   Mr. Tad Manning is a 81yo M with PMH of HFrEF (Stage C, NYHA 3), CAD, s/p CABG L-LAD, SVG-OM1, SVG-D1 in 1995. 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    He underwent a LHC in March 2018 that showed moderate disease in his RCA, severe LAD disease and patent grafts and had two drug eluting stents placed in the proximal and mid LAD. RHC showed elevated right and left sided pressures with type II pulmonary hypertension. He then underwent a R leg below knee amputation in Oct 2018.      Interval History:          Pt was walking on his own. Lost energy and breathing capacity. Was walking with a walker, now using a wheelchair. He denies chest pain, new ALEXIS, palpitations, lightheadedness or dizziness. Exercise capacity is limited. He states most of his symptoms coincide with new R knee erythema starting about 3 months ago. Denies any h/o gout. Reports mild tenderness. No significant swelling. No h/o gout. Pain is not significantly worsened with flexion/extension of the knee. His prosthesis was adjusted in December 2020.    He has been compliant with all of his medications.  He does report some concern with mildly elevated blood pressures in the upper 130s systolic.    His last A1c 1 yr ago was 14, reports his recent blood glucoses have been about 130 in the morning.  He does not recall his latest A1c.    ROS:  A complete 10-pt ROS was negative except as above.    Cardiac meds  - furosemide 20 mg/d  - lisinopril 5 mg/d  - digoxin 125 mcg daily  - warfarin  - metoprolol  mg daily  - atorvastatin 20 mg daily  - Nitro PRN    Current Outpatient Medications   Medication Sig Dispense Refill     allopurinol (ZYLOPRIM) 300 MG tablet Take 300 mg by mouth daily       apixaban ANTICOAGULANT (ELIQUIS ANTICOAGULANT) 5 MG tablet Take 1 tablet (5 mg) by mouth 2  times daily 180 tablet 3     aspirin (ASA) 81 MG chewable tablet CHEW AND SWALLOW 1 TABLET BY MOUTH DAILY       atorvastatin (LIPITOR) 20 MG tablet Take 1 tablet (20 mg) by mouth daily 90 tablet 0     blood glucose (NO BRAND SPECIFIED) test strip Use to test blood sugar 4 times daily or as directed. 100 strip 6     blood glucose calibration (NO BRAND SPECIFIED) solution 1 drop every 14 days Use 1 drop to calibrate blood glucose monitor every 14 days 1 Bottle 3     blood glucose monitoring (NO BRAND SPECIFIED) meter device kit Use to test blood sugar 4 times daily or as directed. 1 kit 0     cholecalciferol 1000 UNITS TABS Take 1,000 Units by mouth daily 30 tablet      citalopram (CELEXA) 20 MG tablet Take 20 mg by mouth every evening       digoxin (LANOXIN) 125 MCG tablet Take 1 tablet (125 mcg) by mouth daily 90 tablet 3     Ferrous Sulfate 324 (65 Fe) MG TBEC Take 1 tablet (324 mg) by mouth daily       furosemide (LASIX) 20 MG tablet Take 1 tablet (20 mg) by mouth daily 90 tablet 3     hydrocortisone (CORTIZONE-10) 1 % ointment Apply topically 2 times daily as needed for itching       insulin aspart (NOVOLOG PEN) 100 UNIT/ML pen Inject 9 Units Subcutaneous daily (with breakfast)       insulin aspart (NOVOLOG PEN) 100 UNIT/ML pen Inject 11 Units Subcutaneous daily (with lunch)       insulin aspart (NOVOLOG PEN) 100 UNIT/ML pen Inject 12 Units Subcutaneous daily (with dinner)       insulin glargine (LANTUS PEN) 100 UNIT/ML pen Inject 30 Units Subcutaneous At Bedtime       magnesium 250 MG tablet Take 2 tablets by mouth daily       metoprolol succinate ER (TOPROL-XL) 100 MG 24 hr tablet Take 1 tablet (100 mg) by mouth daily 90 tablet 3     nitroglycerin (NITROSTAT) 0.4 MG sublingual tablet For chest pain place 1 tablet under the tongue every 5 minutes for 3 doses. If symptoms persist 5 minutes after 1st dose call 911. 25 tablet      pantoprazole (PROTONIX) 40 MG EC tablet Take 40 mg by mouth daily       polyethylene  glycol (MIRALAX/GLYCOLAX) Packet Take 1 packet by mouth daily as needed        sacubitril-valsartan (ENTRESTO) 24-26 MG per tablet Take 1 tablet by mouth 2 times daily       sodium chloride (EQ SALINE NASAL SPRAY) 0.65 % nasal spray Spray 1 spray into both nostrils daily as needed for congestion 1 Bottle 1     thin (NO BRAND SPECIFIED) lancets 1 each 4 times daily Use with lanceting device. 100 each 1     traZODone (DESYREL) 50 MG tablet Take 1 tablet (50 mg) by mouth nightly as needed for sleep       alcohol swab prep pads Use to swab area of injection/jina as directed. 100 each 3     melatonin 3 MG tablet Take 1 tablet (3 mg) by mouth every evening (Patient not taking: Reported on 7/12/2021)       melatonin 5 MG tablet Take 5 mg by mouth At Bedtime (Patient not taking: Reported on 7/12/2021)       order for DME Equipment to be ordered:  Scale- Qty 1  Commander Flex - Qty. 1  Pulse-Oximeter - Qty. 1  Use as directed (Patient not taking: Reported on 7/12/2021) 1 Units 0     order for DME Equipment being ordered: DH2 shoe (Patient not taking: Reported on 7/12/2021) 1 Device 0     ULTICARE SHORT 31G X 8 MM insulin pen needle  (Patient not taking: Reported on 7/12/2021)       VENCLEXTA STARTING PACK 10 & 50 & 100 MG tablet  (Patient not taking: Reported on 7/12/2021)         Past Medical History:   Diagnosis Date     ACS (acute coronary syndrome) (H) 6/20/2021     Acute respiratory failure with hypoxia (H) 4/6/2017     STORM (acute kidney injury) (H) 9/22/2018     Arthritis      ASCVD (arteriosclerotic cardiovascular disease)      Atrial fibrillation (H) 2/5/2018     Atrial flutter (H) 2/22/2017     BMI 30.0-30.9,adult      BPH (benign prostatic hypertrophy)      CAD S/P percutaneous coronary angioplasty 1/11/2014     Cellulitis      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)      Chronic systolic heart failure (H) 6/6/2017     Confusion 6/20/2021     Coronary artery disease     triple  bypass 1995     CRF (chronic renal failure), stage 3 (moderate) 1/20/2017     Critical lower limb ischemia 1/10/2014     Diabetes mellitus (H)      Diabetes mellitus, type 2 (H) 1/11/2014     Diabetic polyneuropathy (H)      Discitis 2/8/2017     Epidural abscess 2/9/2017     Fall 2/7/2017     History of blood transfusion      HTN (hypertension) 1/11/2014     Hyperglycemia 1/9/2020     Hyperlipidaemia      Hyperlipidemia with target LDL less than 100 1/11/2014    Formatting of this note might be different from the original. Overview:  Diagnosis updated by automated process. Provider to review and confirm. Formatting of this note might be different from the original. Diagnosis updated by automated process. Provider to review and confirm.     Hypertension      Long term current use of anticoagulant therapy 2/5/2018     Non-healing ulcer of foot (H)     Right     Noninflammatory pericardial effusion      Osteomyelitis (H) 1/11/2014     Osteomyelitis of foot, right, acute (H) 2/15/2017     Osteomyelitis of left foot (H)      PVD (peripheral vascular disease) (H)      Recurrent falls 6/20/2021     Sebaceous cyst        Past Surgical History:   Procedure Laterality Date     AMPUTATE LEG BELOW KNEE Right 10/8/2018    Procedure: AMPUTATE LEG BELOW KNEE;  OPEN RIGHT LOWER LEG AMPUTATION WITH WOUND VAC PLACEMENT    EBL: 50mL;  Surgeon: Amador Vick MD;  Location:  OR     AMPUTATE REVISION STUMP LOWER EXTREMITY Right 10/11/2018    Procedure: AMPUTATE REVISION STUMP LOWER EXTREMITY;  CLOSURE RIGHT BELOW KNEE AMPUTATION;  Surgeon: Amador Vick MD;  Location:  OR     AMPUTATE TOE(S)  1/10/2014    Procedure: AMPUTATE TOE(S);;  Surgeon: Amador Vick MD;  Location:  OR     APPLY WOUND VAC Right 10/8/2018    Procedure: APPLY WOUND VAC;;  Surgeon: Amador Vick MD;  Location:  OR     BONE MARROW BIOPSY, BONE SPECIMEN, NEEDLE/TROCAR  10/19/2012    Procedure: BIOPSY BONE MARROW;  BONE MARROW BIOPSY   (CONSCIOUS SED);  Surgeon: Ramon Quesada MD;  Location:  GI     BYPASS GRAFT FEMOROPOPLITEAL  1/10/2014    Procedure: BYPASS GRAFT FEMOROPOPLITEAL;  Left above knee popliteal to  Below knee popliteal bypass using transverse saphenous vein graft. Left 2nd Toe amputation;  Surgeon: Amador Vick MD;  Location:  OR     CARDIAC SURGERY      angioplasty with stent, triple bypass     CV ANGIOGRAM CORONARY GRAFT N/A 6/22/2021    Procedure: Angiogram Coronary Graft;  Surgeon: Sury Sorenson MD;  Location:  HEART CARDIAC CATH LAB     CV HEART CATHETERIZATION WITH POSSIBLE INTERVENTION N/A 6/22/2021    Procedure: Heart Catheterization with Possible Intervention;  Surgeon: Sury Sorenson MD;  Location:  HEART CARDIAC CATH LAB     EXCISE CYST GENERIC (LOCATION) N/A 2/1/2016    Procedure: EXCISE CYST GENERIC (LOCATION);  Surgeon: Amador Vick MD;  Location:  [facility]     GRAFT VEIN FROM EXTREMITY (LOCATION)  1/10/2014    Procedure: GRAFT VEIN FROM EXTREMITY (LOCATION);;  Surgeon: Amador Vick MD;  Location:  OR     LAMINECTOMY THORACIC ONE LEVEL N/A 2/8/2017    Procedure: LAMINECTOMY THORACIC ONE LEVEL;  Surgeon: Marcelo Trent MD;  Location: [facility]     OPTICAL TRACKING SYSTEM FUSION POSTERIOR SPINE THORACIC THREE+ LEVELS N/A 2/12/2017    Procedure: OPTICAL TRACKING SYSTEM FUSION POSTERIOR SPINE THORACIC THREE+ LEVELS;  Surgeon: Marcelo Trent MD;  Location: [facility]     OTHER SURGICAL HISTORY  06/22/2021    CV HEART CATH      OTHER SURGICAL HISTORY  06/22/2021    CV ANGIOGRAM CORONARY GRAFT     OTHER SURGICAL HISTORY  10/11/2018    AMPUTATE REVISION STUMP LOWER EXTREMITY     OTHER SURGICAL HISTORY  10/08/2018    APPLY WOUND VAC     OTHER SURGICAL HISTORY  10/08/2018    AMPUTATE LEG BELOW KNEE     OTHER SURGICAL HISTORY  02/12/2017    OPTICAL TRACKING SYSTEM FUSION POSTERIOR SPINE THORACIC THREE + LEVELS     TONSILLECTOMY       VASCULAR SURGERY      ptcr legs   T9-10 discitis with  "overlying epidural abscess  S/p laminectomy / abscess evacuation.  Critical lower limb ischemia , s/p L AK pop to BK pop bypass with rGSV on 1/10/2014  Family History   Problem Relation Age of Onset     Cancer Mother         leukemia     Leukemia Mother        Social History     Tobacco Use     Smoking status: Former Smoker     Packs/day: 2.00     Years: 30.00     Pack years: 60.00     Types: Cigarettes     Quit date: 1995     Years since quittin.5     Smokeless tobacco: Never Used   Substance Use Topics     Alcohol use: No       Allergies   Allergen Reactions     Blood Transfusion Related (Informational Only) Other (See Comments)     Patient has a history of a clinically significant antibody against RBC antigens.  A delay in compatible RBCs may occur.      Blood-Group Specific Substance Other (See Comments)     Patient has a suggestive Warm Auto-antibody. Blood products may be delayed. Draw patient 24 hours prior to transfusion. Draw one red top and two purple top tubes for all type and screen orders.       Physical Examination:  BP (!) 160/72 (BP Location: Left arm, Patient Position: Chair, Cuff Size: Adult Regular)   Pulse 83   Ht 1.791 m (5' 10.5\")   Wt 77.6 kg (171 lb)   SpO2 98%   BMI 24.19 kg/m   Body mass index is 24.19 kg/m .  Gen: pleasant male in no apparent distress      HEENT: NC/AT, EOMI  Neck: supple, no JVD  Heart: regular, no m/r/g   Lungs: Clear bilaterally, no wheezes or crackles  Abdomen: Soft, nontender, nondistended  Extremities: Right below knee amputation, trace edema LLE, 2+ peripheral pulses  Neuro: alert and oriented, no focal deficits  Skin: Erythematous over the right knee, no drainage  Musculoskeletal: R BKA and he has a prosthetic leg    Laboratory:    Chemistry panel:   Recent Labs   Lab Test 19  1420 19  1035  10/03/18  0636  18  0710 18  0042  18  0453    138   < > 135   < > 134 132*   < > 140   POTASSIUM 4.5 4.3   < > 3.2*   < > 4.4 " 4.5   < > 3.7   CHLORIDE 105 105   < > 103   < > 101 96   < > 105   CO2 28 26   < > 22   < > 25 24   < > 25   ANIONGAP 5 7   < > 10   < > 8 12   < > 10   * 192*   < > 135*   < > 285* 303*   < > 131*   BUN 23 23   < > 30   < > 82* 95*   < > 26   CR 1.08 1.03   < > 1.28*   < > 1.91* 2.23*   < > 0.93   JUSTINO 9.0 9.0   < > 8.0*   < > 8.8 9.7   < > 8.6   MAG  --   --   --   --   --  1.8  --   --  1.7   GFRESTIMATED 66 70   < > 55*   < > 34* 29*   < > 79   AST  --   --   --  15  --   --  11   < >  --    ALT  --   --   --  14  --   --  27   < >  --     < > = values in this interval not displayed.       CBC:   Recent Labs   Lab Test 01/14/19  1202 11/08/18  10/22/18  10/14/18  0757  10/11/18  1657   WBC  --   --   --  4.2  --  6.5  --  9.1   RBC  --   --   --  2.94*  --  2.73*  --  2.62*   HGB 12.6* 9.9*   < > 8.5*   < > 8.2*   < > 7.8*   HCT  --   --   --  27.0*  --  24.2*  --  23.5*   MCV  --   --   --  91.8  --  89  --  90   MCH  --   --   --   --   --  30.0  --  29.8   MCHC  --   --   --   --   --  33.9  --  33.2   RDW  --   --   --  16.5*  --  15.9*  --  16.1*   PLT  --   --   --  188  --  130*  --  152    < > = values in this interval not displayed.       Lipid Panel:  Recent Labs   Lab Test 04/09/18  0951 05/23/14  0745 01/10/14  0923   CHOL 65 134 92   HDL 30* 34* 28*   LDL 23 48 45   TRIG 58 257* 92   CHOLHDLRATIO  --  3.9 3.3       Thyroid:   TSH   Date Value Ref Range Status   10/03/2018 1.11 0.40 - 4.00 mU/L Final     No results found for: T4  Hemoglobin A1C   Date Value Ref Range Status   06/20/2021 9.0 (H) 0 - 5.6 % Final     Comment:     Normal <5.7% Prediabetes 5.7-6.4%  Diabetes 6.5% or higher - adopted from ADA   consensus guidelines.       INR   Date Value Ref Range Status   04/16/2021 1.05 0.86 - 1.14 Final         Cardiac data:     Coronary angiogram 6/22/21 for NSTEMI      Dist LAD lesion is 40% stenosed.    Mid LM to Prox LAD lesion is 90% stenosed.    Mid LAD lesion is 100% stenosed.    Ost Cx  to Dist Cx lesion is 100% stenosed.    Prox Graft lesion is 70% stenosed.    1st Diag lesion is 80% stenosed.    Lat 1st Diag lesion is 90% stenosed.     1.  Severe multivessel CAD s/p CABG.                LM has severe stenosis.                LAD has sever proximal stenosis and midLAD is occluded. Distal LAD fills via patent LIMA.                LCx is occluded.                 RCA has moderate diffuse disease.  2.  LIMA to LAD is patent.  Mid to distal LAD stents are patent.  3.  SVG to OM is patent.  4.  SVG to Diagonal is patent with a 70% stenosis and supplies diagonal branches are small and diffusely diseased.    Diseased diagonal graft supplies very severely diffusely diseased diagonal branches.  PCI of the vein graft is unlikely to provide much benefit and there is significant risk of acute occlusion due to poor runoff with severe disease on the native diagonal branch vessels which are too small for PCI.    Recommend medical management.      ECG today: NSR with first degree AV block, anterolateral TWI      ECG 1/14/19: NSR with first degree AV block, anterolateral TWI     ECG July 2018  Atrial fibrillation with rate of 75, nonspecific T-wave changes seen laterally (seen previously)    ECG 04/18: atrial fibrillation that is well controlled, nonspecific T-wave abnormalities laterally.    WALLACE 12/2020: 1. Right WALLACE was not performed. 2. Left WALLACE is not calculated secondary to noncompressible vessels. Normal left digital brachial index. Triphasic waveforms.    Arterial duplex 12/2020: Patent left above-knee to below-knee popliteal artery bypass.    Echo 10/5/18  Left ventricular function  Is moderately reduced, estimated at 35-40%.  There is mild-moderate global hypokinesia of the left ventricle.  Mildly decreased right ventricular systolic function     In comparison with the previous study the LVEF has improved slightly.    RHC 3/2018      Merged with [facility] 3/2018    Echo 2/2018  Severe left ventricular dilation is present.  Biplane LV EF 20%. Severe diffuse hypokinesis is present.  Mild to moderate right ventricular dilation is present.  Global right ventricular function is moderately to severely reduced.  Dilation of the inferior vena cava is present with abnormal respiratory  variation in diameter.  No pericardial effusion is present.  A left pleural effusion is present.    ECG February 2018  Atrial fibrillation nonspecific ST-T abnormalities    EKG 04/06/17:    NSR Rate approx 125 bpm, LAD, no ischemic changes.    Echo (2/24/2017)  Technically difficult study.The patient's rhythm is atrial fibrillation.  The left ventricle is severely dilated. Left ventricular systolic function is  severely reduced, ejection fraction is estimated at 25-30%. There is severe  global hypokinesis.  Right ventricular systolic function is mildly reduced.  The right ventricular systolic pressure is approximated at 15 mmHg plus the  right atrial pressure. IVC is plethoric and estimated mean RA pressure is 15  mmHg.  No pericardial effusion present.    Assessment/Plan:  1. HFrEF, Stage C, NYHA 3  2. CAD  s/p CABG L-LAD, SVG-OM1, SVG-D1 in 1995. s/p LHc in 3/2018 and PCI to mLAD. Not on ASA 2/2 already being   3. Paroxysmal atrial fibrillation  4. Essential Hypertension  5. Dyslipidemia  6. DM Type II  7. PAD with LE stents and bypass   8. R BKA with skin erythema    Mr. Tad Manning is a 81yo M with PMH of HFrEF (Stage C, NYHA 3), CAD, s/p CABG L-LAD, SVG-OM1, SVG-D1 in 1995. 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    Patient appears euvolemic on exam today.  He has a heart rate of 65 with a systolic blood pressure of 135.  He was advised to continue all his current cardiac medications.    In terms of anticoagulation management, recommend changing warfarin to Eliquis 5mg BID. Digoxin level added to  today's labs.    Follow up in 6 months.    ATTENDING ATTESTATION:  This patient has been seen and examined by me January 11, 2021 with  Dr. Ramesh MD, cardiology fellow. I have reviewed the vitals, laboratory and imaging data relevant to this patient's care. I have edited this note to reflect our joint assessment and plan, and discussed the plan with the patient.    Mr. Manning returns for follow-up.  He has ischemic cardiomyopathy and chronic systolic heart failure with an ejection fraction around 35-40%. He underwent a coronary angiogram in March 2018 and was noted to have patent grafts and his native  LAD had significant obstructive disease after the LIMA anastomosis which was intervened with placement of drug-eluting stents.   On exam today he is euvolemic.  His blood pressure is controlled. He is in sinus rhythm today. His lipids are well controlled on atorvastatin to 20 mg once daily.  He is on GDMT and is on lisinopril, metoprolol, digoxin, atorva and aspirin. He is on warfarin for paroxysmal atrial fibrillation which I will switch to Eliquis 5 mg twice daily. Dig level today is 1.1.   Follow-up with me in 6 months.     Clarisse Valdes MD, MS  Professor of Medicine  Cardiovascular division

## 2021-07-12 NOTE — LETTER
7/12/2021      RE: Tad Manning  [address]  [facility] [address]       Dear Colleague,    Thank you for the opportunity to participate in the care of your patient, Tad Manning, at the [facility] [place] at [facility] [place]. Please see a copy of my visit note below.    Cardiology Clinic Note      HPI: 79yo M with PMH of HFrEF (Stage C, NYHA III), CAD, s/p CABG L-LAD, SVG-OM1, SVG-D1 in 1995. 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    Interval Events  Last month, he was admitted to [facility] for an NSTEMI. He was admitted and underwent a LHC which showed progression of his know CAD (LM disease worsening, new stenosis of SVG to 70%). He did not receive intervention and was managed medically. Specifically, he was transitioned to entresto. Since discharge, he notes some difficulty ambulating around his home and caring for himself. He notes that he forgot his medications yesterday. He is accompanied by his sister-in-law in clinic today. They note that he was to have home health and home PT OT services today, but that services did not arrive as expected today. He is going to live in [facility] beginning next month. Until then, he is relying mostly on family and home services to help.     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 Overview:  Diagnosis updated by automated process. Provider to review and confirm. Formatting of this note might be different from the original. Diagnosis updated by automated process. Provider to review and confirm.     Hypertension      Long term current use of anticoagulant therapy 2018     Non-healing ulcer of foot (H)     Right     Noninflammatory pericardial effusion      Osteomyelitis (H) 2014     Osteomyelitis of foot, right, acute (H) 2/15/2017     Osteomyelitis of left foot (H)      PVD (peripheral vascular disease) (H)      Recurrent falls 2021     Sebaceous cyst        FAMILY HISTORY:  Family History   Problem Relation Age of Onset     Cancer Mother         leukemia     Leukemia Mother        SOCIAL HISTORY:  Social History     Socioeconomic History     Marital status: Single     Spouse name: None     Number of children: None     Years of education: None     Highest education level: None   Occupational History     None   Tobacco Use     Smoking status: Former Smoker     Packs/day: 2.00     Years: 30.00     Pack years: 60.00     Types: Cigarettes     Quit date: 1995     Years since quittin.5     Smokeless tobacco: Never Used   Substance and Sexual Activity     Alcohol use: No     Drug  use: No     Sexual activity: None   Other Topics Concern     Parent/sibling w/ CABG, MI or angioplasty before 65F 55M? Not Asked   Social History Narrative    Lives independently with SO. 2/9/2017      Social Determinants of Health     Financial Resource Strain:      Difficulty of Paying Living Expenses:    Food Insecurity:      Worried About Running Out of Food in the Last Year:      Ran Out of Food in the Last Year:    Transportation Needs:      Lack of Transportation (Medical):      Lack of Transportation (Non-Medical):    Physical Activity:      Days of Exercise per Week:      Minutes of Exercise per Session:    Stress:      Feeling of Stress :    Social Connections:      Frequency of Communication with Friends and Family:      Frequency of Social Gatherings with Friends and Family:      Attends Yazidi Services:      Active Member of Clubs or Organizations:      Attends Club or Organization Meetings:      Marital Status:    Intimate Partner Violence:      Fear of Current or Ex-Partner:      Emotionally Abused:      Physically Abused:      Sexually Abused:        CURRENT MEDICATIONS:  Current Outpatient Medications   Medication Sig Dispense Refill     allopurinol (ZYLOPRIM) 300 MG tablet Take 300 mg by mouth daily       apixaban ANTICOAGULANT (ELIQUIS ANTICOAGULANT) 5 MG tablet Take 1 tablet (5 mg) by mouth 2 times daily 180 tablet 3     aspirin (ASA) 81 MG chewable tablet CHEW AND SWALLOW 1 TABLET BY MOUTH DAILY       atorvastatin (LIPITOR) 20 MG tablet Take 1 tablet (20 mg) by mouth daily 90 tablet 0     blood glucose (NO BRAND SPECIFIED) test strip Use to test blood sugar 4 times daily or as directed. 100 strip 6     blood glucose calibration (NO BRAND SPECIFIED) solution 1 drop every 14 days Use 1 drop to calibrate blood glucose monitor every 14 days 1 Bottle 3     blood glucose monitoring (NO BRAND SPECIFIED) meter device kit Use to test blood sugar 4 times daily or as directed. 1 kit 0      cholecalciferol 1000 UNITS TABS Take 1,000 Units by mouth daily 30 tablet      citalopram (CELEXA) 20 MG tablet Take 20 mg by mouth every evening       digoxin (LANOXIN) 125 MCG tablet Take 1 tablet (125 mcg) by mouth daily 90 tablet 3     Ferrous Sulfate 324 (65 Fe) MG TBEC Take 1 tablet (324 mg) by mouth daily       furosemide (LASIX) 20 MG tablet Take 1 tablet (20 mg) by mouth daily 90 tablet 3     hydrocortisone (CORTIZONE-10) 1 % ointment Apply topically 2 times daily as needed for itching       insulin aspart (NOVOLOG PEN) 100 UNIT/ML pen Inject 9 Units Subcutaneous daily (with breakfast)       insulin aspart (NOVOLOG PEN) 100 UNIT/ML pen Inject 11 Units Subcutaneous daily (with lunch)       insulin aspart (NOVOLOG PEN) 100 UNIT/ML pen Inject 12 Units Subcutaneous daily (with dinner)       insulin glargine (LANTUS PEN) 100 UNIT/ML pen Inject 30 Units Subcutaneous At Bedtime       magnesium 250 MG tablet Take 2 tablets by mouth daily       metoprolol succinate ER (TOPROL-XL) 100 MG 24 hr tablet Take 1 tablet (100 mg) by mouth daily 90 tablet 3     nitroglycerin (NITROSTAT) 0.4 MG sublingual tablet For chest pain place 1 tablet under the tongue every 5 minutes for 3 doses. If symptoms persist 5 minutes after 1st dose call 911. 25 tablet      pantoprazole (PROTONIX) 40 MG EC tablet Take 40 mg by mouth daily       polyethylene glycol (MIRALAX/GLYCOLAX) Packet Take 1 packet by mouth daily as needed        sacubitril-valsartan (ENTRESTO) 24-26 MG per tablet Take 1 tablet by mouth 2 times daily       sacubitril-valsartan (ENTRESTO) 49-51 MG per tablet Take 1 tablet by mouth 2 times daily 180 tablet 3     sodium chloride (EQ SALINE NASAL SPRAY) 0.65 % nasal spray Spray 1 spray into both nostrils daily as needed for congestion 1 Bottle 1     thin (NO BRAND SPECIFIED) lancets 1 each 4 times daily Use with lanceting device. 100 each 1     traZODone (DESYREL) 50 MG tablet Take 1 tablet (50 mg) by mouth nightly as needed  "for sleep       alcohol swab prep pads Use to swab area of injection/jina as directed. 100 each 3     melatonin 3 MG tablet Take 1 tablet (3 mg) by mouth every evening (Patient not taking: Reported on 7/12/2021)       melatonin 5 MG tablet Take 5 mg by mouth At Bedtime (Patient not taking: Reported on 7/12/2021)       order for DME Equipment to be ordered:  Scale- Qty 1  Commander Flex - Qty. 1  Pulse-Oximeter - Qty. 1  Use as directed (Patient not taking: Reported on 7/12/2021) 1 Units 0     order for DME Equipment being ordered: DH2 shoe (Patient not taking: Reported on 7/12/2021) 1 Device 0     ULTICARE SHORT 31G X 8 MM insulin pen needle  (Patient not taking: Reported on 7/12/2021)       VENCLEXTA STARTING PACK 10 & 50 & 100 MG tablet  (Patient not taking: Reported on 7/12/2021)       Cardiac Meds  Apixaban 5mg BID  ASA 81mg daily  Lipitor 20mg daily  Digoxin 125mcg daily  Lasix 20mg daily    ROS:   10 point ROS negative except HPI    EXAM:  BP (!) 160/72 (BP Location: Left arm, Patient Position: Chair, Cuff Size: Adult Regular)   Pulse 83   Ht 1.791 m (5' 10.5\")   Wt 77.6 kg (171 lb)   SpO2 98%   BMI 24.19 kg/m    General: appears comfortable, alert and articulate  Head: normocephalic, atraumatic  Eyes: anicteric sclera, EOMI  Neck: no adenopathy  Orophyarynx: moist mucosa, no lesions, dentition intact  Heart: regular, S1/S2, no murmur, gallop, rub  Lungs: clear, no rales or wheezing  Abdomen: soft, non-tender, bowel sounds present, no hepatosplenomegaly  Extremities: no clubbing, cyanosis or edema, R BKA with prostesis  Neurological: normal speech and affect, no gross motor deficits  Skin - rashes    Labs:  CBC RESULTS:  Lab Results   Component Value Date    WBC 3.7 (L) 06/26/2021    RBC 3.09 (L) 06/26/2021    HGB 9.6 (L) 06/26/2021    HCT 28.6 (L) 06/26/2021    MCV 93 06/26/2021    MCH 31.1 06/26/2021    MCHC 33.6 06/26/2021    RDW 14.6 06/26/2021     (L) 06/26/2021       CMP RESULTS:  Lab " Results   Component Value Date     07/12/2021     07/12/2021     06/28/2021    POTASSIUM 4.2 07/12/2021    POTASSIUM 4.2 07/12/2021    POTASSIUM 4.1 06/28/2021    CHLORIDE 109 07/12/2021    CHLORIDE 109 07/12/2021    CHLORIDE 109 06/28/2021    CO2 22 07/12/2021    CO2 22 07/12/2021    CO2 24 06/28/2021    ANIONGAP 7 07/12/2021    ANIONGAP 7 07/12/2021    ANIONGAP 4 06/28/2021     (H) 07/12/2021     (H) 07/12/2021     (H) 06/28/2021    BUN 23 07/12/2021    BUN 24 07/12/2021    BUN 43 (H) 06/28/2021    CR 1.12 07/12/2021    CR 1.11 07/12/2021    CR 1.33 (H) 06/28/2021    GFRESTIMATED 63 07/12/2021    GFRESTIMATED 63 07/12/2021    GFRESTIMATED 51 (L) 06/28/2021    GFRESTBLACK 59 (L) 06/28/2021    JUSTINO 9.3 07/12/2021    JUSTINO 9.3 07/12/2021    JUSTINO 9.4 06/28/2021    BILITOTAL 0.5 07/12/2021    BILITOTAL 0.9 06/20/2021    ALBUMIN 3.5 07/12/2021    ALBUMIN 3.3 (L) 06/20/2021    ALKPHOS 73 07/12/2021    ALKPHOS 72 06/20/2021    ALT 26 07/12/2021    ALT 24 06/20/2021    AST 16 07/12/2021    AST 34 06/20/2021      Lipid T Cholesterol 116 HDL 38 LDL 49  HgB A1c 9%      EKG today - sinus rhythm with first degree AVB        6/19/21        Echocardiograms:   June 2021    1. Left ventricular systolic function is severely reduced. The visual ejection  fraction is estimated at 20-25%.  2. The right ventricle is normal in structure, function and size.  3. The left atrium is moderately dilated.  4. The aortic valve is trileaflet with aortic valve sclerosis.  5. There is mild (1+) mitral regurgitation.    2/15/2018  Severe left ventricular dilation is present.  Biplane LV EF 20%.  Severe diffuse hypokinesis is present.  Mild to moderate right ventricular dilation is present.  Global right ventricular function is moderately to severely reduced.  Dilation of the inferior vena cava is present with abnormal respiratory  variation in diameter.  No pericardial effusion is present.  A left pleural effusion  is present.    10/2015  Left ventricular function  Is moderately reduced, estimated at 35-40%.  There is mild-moderate global hypokinesia of the left ventricle.  Mildly decreased right ventricular systolic function    6/20/21  Interpretation Summary     1. Left ventricular systolic function is severely reduced. The visual ejection  fraction is estimated at 20-25%.  2. The right ventricle is normal in structure, function and size.  3. The left atrium is moderately dilated.  4. The aortic valve is trileaflet with aortic valve sclerosis.  5. There is mild (1+) mitral regurgitation.       [facility] 3/2018      [facility] 6/19/21    1.  Severe multivessel CAD s/p CABG.                LM has severe stenosis.                LAD has sever proximal stenosis and midLAD is occluded. Distal LAD fills via patent LIMA.                LCx is occluded.                 RCA has moderate diffuse disease.  2.  LIMA to LAD is patent.  Mid to distal LAD stents are patent.  3.  SVG to OM is patent.  4.  SVG to Diagonal is patent with a 70% stenosis and supplies diagonal branches are small and diffusely diseased.      Assessment and Plan: Tad Manning is a 81yo M with PMH of HFrEF (Stage C, NYHA III), CAD, s/p CABG L-LAD, SVG-OM1, SVG-D1 in 1995. 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    Coronary artery disease-, s/p CABG L-LAD, SVG-OM1, SVG-D1 in 1995. s/p LHC in 3/2018 and PCI to mLAD now with worsening CAD  -Continue Atorvastatin 20MG, his most recent lipid panel is at goal  -continue ASA 81MG    Cardiac function-EF 25%. He was recently initiated on entresto 24-26 which he is tolerating well. He denies dizziness and his renal function and electrolytes remain WNL. Plan to increase entresto to 49-51 BID. He will have Lytes checked in 2 weeks and, if renal and  electrolytes remain normal, this should be increased again. Should consider the addition of empaglaflozin 10mg and spironolactone 25mg  daily at this visit for GDMT. Given his QRS on EKG, he may also be considered for CRT if his EF does not improve with uptitration of his GDMT. He is scheduled for a repeat TTE in 3 months to assess EF with uptitration of medications.  -continue lasix 20mg daily, he is euvolumic on todays exam  -conitnue metoprolol 100mg daily      Rhythym-Afib [id number] Apixaban   -conitnue digoxin 125mcg daily  -toprol as above    Germán Saucedo MD PGY-4   Discussed with attending Dr. Lucio FIGUEROA, MS      ATTENDING ATTESTATION:  This patient has been seen and examined by me July 12, 2021 with Dr. Saucedo, cardiology fellow. I have reviewed the vitals, laboratory and imaging data relevant to this patient's care. I have edited this note to reflect our joint assessment and plan, and discussed the plan with the patient.    Mr. Manning returns for follow-up.  He has ischemic cardiomyopathy and chronic systolic heart failure with an ejection fraction around 25%. He has severe native CAD and disease in the SVG graft to the diagonal. On exam today he is euvolemic.  His blood pressure is controlled. He is in sinus rhythm today. His lipids are well controlled on atorvastatin to 20 mg once daily.  He is on GDMT and is on entresto, metoprolol, digoxin, atorva and aspirin. He is on apixaban for paroxysmal atrial fibrillation. Dig level in June 2021 was 0.4. I will increase the entresto to 49/51 mg and repeat labs in 2 weeks.   Follow-up with me in 3 months.     Clarisse Valdes MD, MS  Professor of Medicine  Cardiovascular division

## 2021-07-12 NOTE — PROGRESS NOTES
Cardiology Clinic Note      HPI: 81yo M with PMH of HFrEF (Stage C, NYHA III), CAD, s/p CABG L-LAD, SVG-OM1, SVG-D1 in 1995. 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    Interval Events  Last month, he was admitted to [facility] for an NSTEMI. He was admitted and underwent a LHC which showed progression of his know CAD (LM disease worsening, new stenosis of SVG to 70%). He did not receive intervention and was managed medically. [facility] Since discharge, he notes some difficulty ambulating around his home and caring for himself. He notes that he forgot his medications yesterday. He is accompanied by his sister-in-law in clinic today. They note that he was to have home health and home PT OT services today, but that services did not arrive as expected today. He is going to live in [facility] beginning next month. Until then, he is relying mostly on family and home services to help.     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)      Chronic systolic heart failure (H) 6/6/2017     Confusion 6/20/2021     Coronary artery disease     triple bypass 1995     CRF (chronic renal failure), stage 3 (moderate) 1/20/2017     Critical lower limb ischemia 1/10/2014     Diabetes mellitus (H)      Diabetes mellitus, type 2 (H) 1/11/2014      Diabetic polyneuropathy (H)      Discitis 2017     Epidural abscess 2017     Fall 2017     History of blood transfusion      HTN (hypertension) 2014     Hyperglycemia 2020     Hyperlipidaemia      Hyperlipidemia with target LDL less than 100 2014    Formatting of this note might be different from the original. Overview:  Diagnosis updated by automated process. Provider to review and confirm. Formatting of this note might be different from the original. Diagnosis updated by automated process. Provider to review and confirm.     Hypertension      Long term current use of anticoagulant therapy 2018     Non-healing ulcer of foot (H)     Right     Noninflammatory pericardial effusion      Osteomyelitis (H) 2014     Osteomyelitis of foot, right, acute (H) 2/15/2017     Osteomyelitis of left foot (H)      PVD (peripheral vascular disease) (H)      Recurrent falls 2021     Sebaceous cyst        FAMILY HISTORY:  Family History   Problem Relation Age of Onset     Cancer Mother         leukemia     Leukemia Mother        SOCIAL HISTORY:  Social History     Socioeconomic History     Marital status: Single     Spouse name: None     Number of children: None     Years of education: None     Highest education level: None   Occupational History     None   Tobacco Use     Smoking status: Former Smoker     Packs/day: 2.00     Years: 30.00     Pack years: 60.00     Types: Cigarettes     Quit date: 1995     Years since quittin.5     Smokeless tobacco: Never Used   Substance and Sexual Activity     Alcohol use: No     Drug use: No     Sexual activity: None   Other Topics Concern     Parent/sibling w/ CABG, MI or angioplasty before 65F 55M? Not Asked   Social History Narrative    Lives independently with SO. 2017      Social Determinants of Health     Financial Resource Strain:      Difficulty of Paying Living Expenses:    Food Insecurity:      Worried About Running Out of Food in  the Last Year:      Ran Out of Food in the Last Year:    Transportation Needs:      Lack of Transportation (Medical):      Lack of Transportation (Non-Medical):    Physical Activity:      Days of Exercise per Week:      Minutes of Exercise per Session:    Stress:      Feeling of Stress :    Social Connections:      Frequency of Communication with Friends and Family:      Frequency of Social Gatherings with Friends and Family:      Attends [employer] Services:      Active Member of Clubs or Organizations:      Attends Club or Organization Meetings:      Marital Status:    Intimate Partner Violence:      Fear of Current or Ex-Partner:      Emotionally Abused:      Physically Abused:      Sexually Abused:        CURRENT MEDICATIONS:  Current Outpatient Medications   Medication Sig Dispense Refill     allopurinol (ZYLOPRIM) 300 MG tablet Take 300 mg by mouth daily       apixaban ANTICOAGULANT (ELIQUIS ANTICOAGULANT) 5 MG tablet Take 1 tablet (5 mg) by mouth 2 times daily 180 tablet 3     aspirin (ASA) 81 MG chewable tablet CHEW AND SWALLOW 1 TABLET BY MOUTH DAILY       atorvastatin (LIPITOR) 20 MG tablet Take 1 tablet (20 mg) by mouth daily 90 tablet 0     blood glucose (NO BRAND SPECIFIED) test strip Use to test blood sugar 4 times daily or as directed. 100 strip 6     blood glucose calibration (NO BRAND SPECIFIED) solution 1 drop every 14 days Use 1 drop to calibrate blood glucose monitor every 14 days 1 Bottle 3     blood glucose monitoring (NO BRAND SPECIFIED) meter device kit Use to test blood sugar 4 times daily or as directed. 1 kit 0     cholecalciferol 1000 UNITS TABS Take 1,000 Units by mouth daily 30 tablet      citalopram (CELEXA) 20 MG tablet Take 20 mg by mouth every evening       digoxin (LANOXIN) 125 MCG tablet Take 1 tablet (125 mcg) by mouth daily 90 tablet 3     Ferrous Sulfate 324 (65 Fe) MG TBEC Take 1 tablet (324 mg) by mouth daily       furosemide (LASIX) 20 MG tablet Take 1 tablet (20 mg) by mouth  daily 90 tablet 3     hydrocortisone (CORTIZONE-10) 1 % ointment Apply topically 2 times daily as needed for itching       insulin aspart (NOVOLOG PEN) 100 UNIT/ML pen Inject 9 Units Subcutaneous daily (with breakfast)       insulin aspart (NOVOLOG PEN) 100 UNIT/ML pen Inject 11 Units Subcutaneous daily (with lunch)       insulin aspart (NOVOLOG PEN) 100 UNIT/ML pen Inject 12 Units Subcutaneous daily (with dinner)       insulin glargine (LANTUS PEN) 100 UNIT/ML pen Inject 30 Units Subcutaneous At Bedtime       magnesium 250 MG tablet Take 2 tablets by mouth daily       metoprolol succinate ER (TOPROL-XL) 100 MG 24 hr tablet Take 1 tablet (100 mg) by mouth daily 90 tablet 3     nitroglycerin (NITROSTAT) 0.4 MG sublingual tablet For chest pain place 1 tablet under the tongue every 5 minutes for 3 doses. If symptoms persist 5 minutes after 1st dose call 911. 25 tablet      pantoprazole (PROTONIX) 40 MG EC tablet Take 40 mg by mouth daily       polyethylene glycol (MIRALAX/GLYCOLAX) Packet Take 1 packet by mouth daily as needed        sacubitril-valsartan (ENTRESTO) 24-26 MG per tablet Take 1 tablet by mouth 2 times daily       sacubitril-valsartan (ENTRESTO) 49-51 MG per tablet Take 1 tablet by mouth 2 times daily 180 tablet 3     sodium chloride (EQ SALINE NASAL SPRAY) 0.65 % nasal spray Spray 1 spray into both nostrils daily as needed for congestion 1 Bottle 1     thin (NO BRAND SPECIFIED) lancets 1 each 4 times daily Use with lanceting device. 100 each 1     traZODone (DESYREL) 50 MG tablet Take 1 tablet (50 mg) by mouth nightly as needed for sleep       alcohol swab prep pads Use to swab area of injection/jina as directed. 100 each 3     melatonin 3 MG tablet Take 1 tablet (3 mg) by mouth every evening (Patient not taking: Reported on 7/12/2021)       melatonin 5 MG tablet Take 5 mg by mouth At Bedtime (Patient not taking: Reported on 7/12/2021)       order for DME Equipment to be ordered:  Scale- Qty 1  Commander  "Flex - Qty. 1  Pulse-Oximeter - Qty. 1  Use as directed (Patient not taking: Reported on 7/12/2021) 1 Units 0     order for DME Equipment being ordered: DH2 shoe (Patient not taking: Reported on 7/12/2021) 1 Device 0     ULTICARE SHORT 31G X 8 MM insulin pen needle  (Patient not taking: Reported on 7/12/2021)       VENCLEXTA STARTING PACK 10 & 50 & 100 MG tablet  (Patient not taking: Reported on 7/12/2021)       Cardiac Meds  Apixaban 5mg BID  ASA 81mg daily  Lipitor 20mg daily  Digoxin 125mcg daily  Lasix 20mg daily    ROS:   10 point ROS negative except HPI    EXAM:  BP (!) 160/72 (BP Location: Left arm, Patient Position: Chair, Cuff Size: Adult Regular)   Pulse 83   Ht 1.791 m (5' 10.5\")   Wt 77.6 kg (171 lb)   SpO2 98%   BMI 24.19 kg/m    General: appears comfortable, alert and articulate  Head: normocephalic, atraumatic  Eyes: anicteric sclera, EOMI  Neck: no adenopathy  Orophyarynx: moist mucosa, no lesions, dentition intact  Heart: regular, S1/S2, no murmur, gallop, rub  Lungs: clear, no rales or wheezing  Abdomen: soft, non-tender, bowel sounds present, no hepatosplenomegaly  Extremities: no clubbing, cyanosis or edema, R BKA with prostesis  Neurological: normal speech and affect, no gross motor deficits  Skin - rashes    Labs:  CBC RESULTS:  Lab Results   Component Value Date    WBC 3.7 (L) 06/26/2021    RBC 3.09 (L) 06/26/2021    HGB 9.6 (L) 06/26/2021    HCT 28.6 (L) 06/26/2021    MCV 93 06/26/2021    MCH 31.1 06/26/2021    MCHC 33.6 06/26/2021    RDW 14.6 06/26/2021     (L) 06/26/2021       CMP RESULTS:  Lab Results   Component Value Date     07/12/2021     07/12/2021     06/28/2021    POTASSIUM 4.2 07/12/2021    POTASSIUM 4.2 07/12/2021    POTASSIUM 4.1 06/28/2021    CHLORIDE 109 07/12/2021    CHLORIDE 109 07/12/2021    CHLORIDE 109 06/28/2021    CO2 22 07/12/2021    CO2 22 07/12/2021    CO2 24 06/28/2021    ANIONGAP 7 07/12/2021    ANIONGAP 7 07/12/2021    ANIONGAP 4 " 06/28/2021     (H) 07/12/2021     (H) 07/12/2021     (H) 06/28/2021    BUN 23 07/12/2021    BUN 24 07/12/2021    BUN 43 (H) 06/28/2021    CR 1.12 07/12/2021    CR 1.11 07/12/2021    CR 1.33 (H) 06/28/2021    GFRESTIMATED 63 07/12/2021    GFRESTIMATED 63 07/12/2021    GFRESTIMATED 51 (L) 06/28/2021    GFRESTBLACK 59 (L) 06/28/2021    JUSTINO 9.3 07/12/2021    JUSTINO 9.3 07/12/2021    JUSTINO 9.4 06/28/2021    BILITOTAL 0.5 07/12/2021    BILITOTAL 0.9 06/20/2021    ALBUMIN 3.5 07/12/2021    ALBUMIN 3.3 (L) 06/20/2021    ALKPHOS 73 07/12/2021    ALKPHOS 72 06/20/2021    ALT 26 07/12/2021    ALT 24 06/20/2021    AST 16 07/12/2021    AST 34 06/20/2021      Lipid T Cholesterol 116 HDL 38 LDL 49  HgB A1c 9%      EKG today - sinus rhythm with first degree AVB        6/19/21        Echocardiograms:   June 2021    1. Left ventricular systolic function is severely reduced. The visual ejection  fraction is estimated at 20-25%.  2. The right ventricle is normal in structure, function and size.  3. The left atrium is moderately dilated.  4. The aortic valve is trileaflet with aortic valve sclerosis.  5. There is mild (1+) mitral regurgitation.    2/15/2018  Severe left ventricular dilation is present.  Biplane LV EF 20%.  Severe diffuse hypokinesis is present.  Mild to moderate right ventricular dilation is present.  Global right ventricular function is moderately to severely reduced.  Dilation of the inferior vena cava is present with abnormal respiratory  variation in diameter.  No pericardial effusion is present.  A left pleural effusion is present.    10/2015  Left ventricular function  Is moderately reduced, estimated at 35-40%.  There is mild-moderate global hypokinesia of the left ventricle.  Mildly decreased right ventricular systolic function    6/20/21  Interpretation Summary     1. Left ventricular systolic function is severely reduced. The visual ejection  fraction is estimated at 20-25%.  2. The right  ventricle is normal in structure, function and size.  3. The left atrium is moderately dilated.  4. The aortic valve is trileaflet with aortic valve sclerosis.  5. There is mild (1+) mitral regurgitation.       [facility] 3/2018      [facility] 6/19/21    1.  Severe multivessel CAD s/p CABG.                LM has severe stenosis.                LAD has sever proximal stenosis and midLAD is occluded. Distal LAD fills via patent LIMA.                LCx is occluded.                 RCA has moderate diffuse disease.  2.  LIMA to LAD is patent.  Mid to distal LAD stents are patent.  3.  SVG to OM is patent.  4.  SVG to Diagonal is patent with a 70% stenosis and supplies diagonal branches are small and diffusely diseased.      Assessment and Plan: Tad Manning is a 79yo M with PMH of HFrEF (Stage C, NYHA III), CAD, s/p CABG L-LAD, SVG-OM1, SVG-D1 in 1995. 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    Coronary artery disease-, s/p CABG L-LAD, SVG-OM1, SVG-D1 in 1995. s/p LHC in 3/2018 and PCI to mLAD now with worsening CAD  -Continue Atorvastatin 20MG, his most recent lipid panel is at goal  -continue ASA 81MG    Cardiac function-EF 25%. He was recently initiated on entresto 24-26 which he is tolerating well. He denies dizziness and his renal function and electrolytes remain WNL. Plan to increase entresto to 49-51 BID. He will have Lytes checked in 2 weeks and, if renal and electrolytes remain normal, this should be increased again. Should consider the addition of empaglaflozin 10mg and spironolactone 25mg  daily at this visit for GDMT. Given his QRS on EKG, he may also be considered for CRT if his EF does not improve with uptitration of his GDMT. He is scheduled for a repeat TTE in 3 months to assess EF with uptitration of medications.  -continue lasix 20mg  daily, he is euvolumic on todays exam  -conitnue metoprolol 100mg daily      Rhythym-Afib [id number] Apixaban   -conitnue digoxin 125mcg daily  -toprol as above    Germán Saucedo MD PGY-4   Discussed with attending Dr. Lucio FIGUEROA, MS      ATTENDING ATTESTATION:  This patient has been seen and examined by me July 12, 2021 with Dr. Saucedo, cardiology fellow. I have reviewed the vitals, laboratory and imaging data relevant to this patient's care. I have edited this note to reflect our joint assessment and plan, and discussed the plan with the patient.    Mr. Manning returns for follow-up.  He has ischemic cardiomyopathy and chronic systolic heart failure with an ejection fraction around 25%. He has severe native CAD and disease in the SVG graft to the diagonal. On exam today he is euvolemic.  His blood pressure is controlled. He is in sinus rhythm today. His lipids are well controlled on atorvastatin to 20 mg once daily.  He is on GDMT and is on entresto, metoprolol, digoxin, atorva and aspirin. He is on apixaban for paroxysmal atrial fibrillation. Dig level in June 2021 was 0.4. I will increase the entresto to 49/51 mg and repeat labs in 2 weeks.   Follow-up with me in 3 months.     Clarisse Valdes MD, MS  Professor of Medicine  Cardiovascular division

## 2021-07-15 LAB
ATRIAL RATE - MUSE: 83 BPM
DIASTOLIC BLOOD PRESSURE - MUSE: NORMAL MMHG
INTERPRETATION ECG - MUSE: NORMAL
P AXIS - MUSE: 112 DEGREES
PR INTERVAL - MUSE: 248 MS
QRS DURATION - MUSE: 146 MS
QT - MUSE: 402 MS
QTC - MUSE: 472 MS
R AXIS - MUSE: -46 DEGREES
SYSTOLIC BLOOD PRESSURE - MUSE: NORMAL MMHG
T AXIS - MUSE: 102 DEGREES
VENTRICULAR RATE- MUSE: 83 BPM

## 2021-07-28 ENCOUNTER — TELEPHONE (OUTPATIENT)
Dept: CARDIOLOGY | Facility: CLINIC | Age: 81
End: 2021-07-28

## 2021-07-28 NOTE — TELEPHONE ENCOUNTER
[facility] Call Center    Phone Message    May a detailed message be left on voicemail: yes     Reason for Call: Other: Pt sister mohamud would like a callback to know if pt needs to be seen or can take medication with out appt. please reach out to discuss     Action Taken: Message routed to:  [facility] ([facility]): Cardio    Travel Screening: Not Applicable

## 2021-07-29 DIAGNOSIS — I50.22 CHRONIC SYSTOLIC HEART FAILURE (H): Primary | ICD-10-CM

## 2021-07-29 NOTE — TELEPHONE ENCOUNTER
Spoke with pt about which medication he had questions about starting. He is unsure but believes his sister in law is asking about entresto dose.    He has an appointment with [facility] tomorrow 7/30 so I instructed pt to verify with sister in law which medication she had questions about and ask [facility] at his appointment tomorrow.     Pt agreed with plan and denies any further questions.     Lazaro Collado, RN   Cardiology Nurse Coordinator

## 2021-07-30 ENCOUNTER — OFFICE VISIT (OUTPATIENT)
Dept: CARDIOLOGY | Facility: CLINIC | Age: 81
End: 2021-07-30
Attending: NURSE PRACTITIONER
Payer: COMMERCIAL

## 2021-07-30 ENCOUNTER — LAB (OUTPATIENT)
Dept: LAB | Facility: CLINIC | Age: 81
End: 2021-07-30
Payer: COMMERCIAL

## 2021-07-30 VITALS
SYSTOLIC BLOOD PRESSURE: 135 MMHG | WEIGHT: 176 LBS | OXYGEN SATURATION: 99 % | HEIGHT: 71 IN | HEART RATE: 74 BPM | BODY MASS INDEX: 24.64 KG/M2 | DIASTOLIC BLOOD PRESSURE: 61 MMHG

## 2021-07-30 DIAGNOSIS — I50.22 CHRONIC SYSTOLIC HEART FAILURE (H): Primary | ICD-10-CM

## 2021-07-30 DIAGNOSIS — I50.22 CHRONIC SYSTOLIC HEART FAILURE (H): ICD-10-CM

## 2021-07-30 DIAGNOSIS — I25.5 ISCHEMIC CARDIOMYOPATHY: ICD-10-CM

## 2021-07-30 DIAGNOSIS — N18.30 CRF (CHRONIC RENAL FAILURE), STAGE 3 (MODERATE) (H): ICD-10-CM

## 2021-07-30 DIAGNOSIS — I48.0 PAROXYSMAL A-FIB (H): ICD-10-CM

## 2021-07-30 LAB
ALBUMIN SERPL-MCNC: 3.2 G/DL (ref 3.4–5)
ALP SERPL-CCNC: 76 U/L (ref 40–150)
ALT SERPL W P-5'-P-CCNC: 17 U/L (ref 0–70)
ANION GAP SERPL CALCULATED.3IONS-SCNC: 8 MMOL/L (ref 3–14)
AST SERPL W P-5'-P-CCNC: 14 U/L (ref 0–45)
BILIRUB SERPL-MCNC: 0.6 MG/DL (ref 0.2–1.3)
BUN SERPL-MCNC: 42 MG/DL (ref 7–30)
CALCIUM SERPL-MCNC: 9.4 MG/DL (ref 8.5–10.1)
CHLORIDE BLD-SCNC: 108 MMOL/L (ref 94–109)
CO2 SERPL-SCNC: 24 MMOL/L (ref 20–32)
CREAT SERPL-MCNC: 1.44 MG/DL (ref 0.66–1.25)
DIGOXIN SERPL-MCNC: 0.9 UG/L
GFR SERPL CREATININE-BSD FRML MDRD: 46 ML/MIN/1.73M2
GLUCOSE BLD-MCNC: 200 MG/DL (ref 70–99)
POTASSIUM BLD-SCNC: 4.3 MMOL/L (ref 3.4–5.3)
PROT SERPL-MCNC: 6.6 G/DL (ref 6.8–8.8)
SODIUM SERPL-SCNC: 140 MMOL/L (ref 133–144)

## 2021-07-30 PROCEDURE — 36415 COLL VENOUS BLD VENIPUNCTURE: CPT | Performed by: PATHOLOGY

## 2021-07-30 PROCEDURE — 99214 OFFICE O/P EST MOD 30 MIN: CPT | Performed by: NURSE PRACTITIONER

## 2021-07-30 PROCEDURE — 80053 COMPREHEN METABOLIC PANEL: CPT | Performed by: PATHOLOGY

## 2021-07-30 PROCEDURE — 80162 ASSAY OF DIGOXIN TOTAL: CPT | Performed by: STUDENT IN AN ORGANIZED HEALTH CARE EDUCATION/TRAINING PROGRAM

## 2021-07-30 PROCEDURE — G0463 HOSPITAL OUTPT CLINIC VISIT: HCPCS

## 2021-07-30 ASSESSMENT — MIFFLIN-ST. JEOR: SCORE: 1532.52

## 2021-07-30 ASSESSMENT — PAIN SCALES - GENERAL: PAINLEVEL: NO PAIN (0)

## 2021-07-30 NOTE — NURSING NOTE
Chief Complaint   Patient presents with     Follow Up     Return [facility], labs prior     Vitals were taken and medications were reconciled.     Karina Dover RMA  12:29 PM

## 2021-07-30 NOTE — PROGRESS NOTES
HPI:   Mr. 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 discitis and multple admission for decompensated heart failure. Presents to clinic for CORE f/u.    Since last visit with Dr Valdes, he reports feeling fine. He has home health RN 1-2 a week , PT, OT- 2 times a week. He has family involved in his care. He is taking the Entresto dose 24/26 BID - he didn't know about the increase Dr. Valdes had recommended until a day ago .  He just recently got the new prescription. He has not had issues with fluid retention. Remains on low dose diuretic. When he is fluid-up, he notes abdominal bloating but not recently.  He denies orthopnea, PND, LE edema. Patient denies any chest pain or shortness of breath with current level of exertion. His activity is restricted by his leg. Denies lightheadedness or orthostasis. No palpitations or syncope. He does not check BP at home. States he has not taken metoprolol in about one week because he ran out. He is moving to assisted living in August.     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)      Chronic systolic heart failure (H) 6/6/2017     Confusion 6/20/2021     Coronary artery disease     triple bypass 1995     CRF (chronic renal  failure), stage 3 (moderate) 2017     Critical lower limb ischemia 1/10/2014     Diabetes mellitus (H)      Diabetes mellitus, type 2 (H) 2014     Diabetic polyneuropathy (H)      Discitis 2017     Epidural abscess 2017     Fall 2017     History of blood transfusion      HTN (hypertension) 2014     Hyperglycemia 2020     Hyperlipidaemia      Hyperlipidemia with target LDL less than 100 2014    Formatting of this note might be different from the original. Overview:  Diagnosis updated by automated process. Provider to review and confirm. Formatting of this note might be different from the original. Diagnosis updated by automated process. Provider to review and confirm.     Hypertension      Long term current use of anticoagulant therapy 2018     Non-healing ulcer of foot (H)     Right     Noninflammatory pericardial effusion      Osteomyelitis (H) 2014     Osteomyelitis of foot, right, acute (H) 2/15/2017     Osteomyelitis of left foot (H)      PVD (peripheral vascular disease) (H)      Recurrent falls 2021     Sebaceous cyst        FAMILY HISTORY:  Family History   Problem Relation Age of Onset     Cancer Mother         leukemia     Leukemia Mother        SOCIAL HISTORY:  Socioeconomic History     Marital status: Single   Tobacco Use     Smoking status: Former Smoker     Packs/day: 2.00     Years: 30.00     Pack years: 60.00     Types: Cigarettes     Last attempt to quit: 1995     Years since quittin.5     Smokeless tobacco: Never Used   Other Topics Concern     Parent/sibling w/ CABG, MI or angioplasty before 65F 55M? Not Asked   Social History Narrative    Lives independently with SO. 2017        CURRENT MEDICATIONS:  alcohol swab prep pads, Use to swab area of injection/jina as directed.  allopurinol (ZYLOPRIM) 300 MG tablet, Take 300 mg by mouth daily  apixaban ANTICOAGULANT (ELIQUIS ANTICOAGULANT) 5 MG tablet, Take 1 tablet (5 mg) by mouth 2 times  daily  aspirin (ASA) 81 MG chewable tablet, CHEW AND SWALLOW 1 TABLET BY MOUTH DAILY  atorvastatin (LIPITOR) 20 MG tablet, Take 1 tablet (20 mg) by mouth daily  blood glucose (NO BRAND SPECIFIED) test strip, Use to test blood sugar 4 times daily or as directed.  blood glucose calibration (NO BRAND SPECIFIED) solution, 1 drop every 14 days Use 1 drop to calibrate blood glucose monitor every 14 days  blood glucose monitoring (NO BRAND SPECIFIED) meter device kit, Use to test blood sugar 4 times daily or as directed.  cholecalciferol 1000 UNITS TABS, Take 1,000 Units by mouth daily  citalopram (CELEXA) 20 MG tablet, Take 20 mg by mouth every evening  digoxin (LANOXIN) 125 MCG tablet, Take 1 tablet (125 mcg) by mouth daily  Ferrous Sulfate 324 (65 Fe) MG TBEC, Take 1 tablet (324 mg) by mouth daily  furosemide (LASIX) 20 MG tablet, Take 1 tablet (20 mg) by mouth daily  hydrocortisone (CORTIZONE-10) 1 % ointment, Apply topically 2 times daily as needed for itching  insulin aspart (NOVOLOG PEN) 100 UNIT/ML pen, Inject 9 Units Subcutaneous daily (with breakfast)  insulin aspart (NOVOLOG PEN) 100 UNIT/ML pen, Inject 11 Units Subcutaneous daily (with lunch)  insulin aspart (NOVOLOG PEN) 100 UNIT/ML pen, Inject 12 Units Subcutaneous daily (with dinner)  insulin glargine (LANTUS PEN) 100 UNIT/ML pen, Inject 30 Units Subcutaneous At Bedtime  magnesium 250 MG tablet, Take 2 tablets by mouth daily  metoprolol succinate ER (TOPROL-XL) 100 MG 24 hr tablet, Take 1 tablet (100 mg) by mouth daily  nitroglycerin (NITROSTAT) 0.4 MG sublingual tablet, For chest pain place 1 tablet under the tongue every 5 minutes for 3 doses. If symptoms persist 5 minutes after 1st dose call 911.  pantoprazole (PROTONIX) 40 MG EC tablet, Take 40 mg by mouth daily  polyethylene glycol (MIRALAX/GLYCOLAX) Packet, Take 1 packet by mouth daily as needed   sacubitril-valsartan (ENTRESTO) 49-51 MG per tablet, Take 1 tablet by mouth 2 times daily  sodium chloride  "(EQ SALINE NASAL SPRAY) 0.65 % nasal spray, Spray 1 spray into both nostrils daily as needed for congestion  thin (NO BRAND SPECIFIED) lancets, 1 each 4 times daily Use with lanceting device.  traZODone (DESYREL) 50 MG tablet, Take 1 tablet (50 mg) by mouth nightly as needed for sleep  melatonin 3 MG tablet, Take 1 tablet (3 mg) by mouth every evening (Patient not taking: Reported on 7/12/2021)  melatonin 5 MG tablet, Take 5 mg by mouth At Bedtime (Patient not taking: Reported on 7/12/2021)  order for DME, Equipment to be ordered:  Scale- Qty 1  Commander Flex - Qty. 1  Pulse-Oximeter - Qty. 1  Use as directed (Patient not taking: Reported on 7/12/2021)  order for DME, Equipment being ordered: DH2 shoe (Patient not taking: Reported on 7/12/2021)  sacubitril-valsartan (ENTRESTO) 24-26 MG per tablet, Take 1 tablet by mouth 2 times daily (Patient not taking: Reported on 7/30/2021)  ULTICARE SHORT 31G X 8 MM insulin pen needle,   VENCLEXTA STARTING PACK 10 & 50 & 100 MG tablet,     No current facility-administered medications on file prior to visit.      ROS:   CONSTITUTIONAL: Denies fever, chills, fatigue, or weight fluctuations.   HEENT: Denies headache, vision changes, and changes in speech.   CV: Refer to HPI.   PULMONARY:Refer to HPI.   GI:Denies nausea, vomiting, diarrhea, and abdominal pain. Bowel movements are regular.   :Denies urinary alterations, dysuria, urinary frequency, hematuria, and abnormal drainage.   EXT:Denies lower extremity edema.   SKIN:Denies abnormal rashes or lesions.   MUSCULOSKELETAL:Denies upper or lower extremity weakness and pain.   NEUROLOGIC:Denies lightheadedness, dizziness, seizures, or upper or lower extremity paresthesia.     EXAM:  /61   Pulse 74   Ht 1.791 m (5' 10.5\")   Wt 79.8 kg (176 lb)   SpO2 99%   BMI 24.90 kg/m       GENERAL: Appears comfortable, in no acute distress.   HEENT: Eye symmetrical, no discharge or icterus bilaterally. Mucous membranes moist and " without lesions.  CV: RRR, +S1S2, no murmur, rub, or gallop. JVP not visible at 90 degrees.   RESPIRATORY: Respirations regular, even, and unlabored. Lungs CTA throughout.   GI: Soft and non distended with normoactive bowel sounds present in all quadrants. No tenderness, rebound, guarding. No hepatomegaly.   EXTREMITIES: No peripheral edema. 2+ bilateral pedal pulses.   NEUROLOGIC: Alert and oriented x 3. No focal deficits.   MUSCULOSKELETAL: No joint swelling or tenderness.   SKIN: No jaundice. No rashes or lesions.     Labs, reviewed with patient in clinic today:  CBC RESULTS:  Lab Results   Component Value Date    WBC 3.7 (L) 06/26/2021    RBC 3.09 (L) 06/26/2021    HGB 9.6 (L) 06/26/2021    HCT 28.6 (L) 06/26/2021    MCV 93 06/26/2021    MCH 31.1 06/26/2021    MCHC 33.6 06/26/2021    RDW 14.6 06/26/2021     (L) 06/26/2021       CMP RESULTS:  Lab Results   Component Value Date     07/30/2021     06/28/2021    POTASSIUM 4.3 07/30/2021    POTASSIUM 4.1 06/28/2021    CHLORIDE 108 07/30/2021    CHLORIDE 109 06/28/2021    CO2 24 07/30/2021    CO2 24 06/28/2021    ANIONGAP 8 07/30/2021    ANIONGAP 4 06/28/2021     (H) 07/30/2021     (H) 06/28/2021    BUN 42 (H) 07/30/2021    BUN 43 (H) 06/28/2021    CR 1.44 (H) 07/30/2021    CR 1.33 (H) 06/28/2021    GFRESTIMATED 46 (L) 07/30/2021    GFRESTIMATED >60 07/08/2021    GFRESTIMATED 51 (L) 06/28/2021    GFRESTBLACK >60 07/08/2021    GFRESTBLACK 59 (L) 06/28/2021    JUSTINO 9.4 07/30/2021    JUSTINO 9.4 06/28/2021    BILITOTAL 0.6 07/30/2021    BILITOTAL 0.9 06/20/2021    ALBUMIN 3.2 (L) 07/30/2021    ALBUMIN 3.3 (L) 06/20/2021    ALKPHOS 76 07/30/2021    ALKPHOS 72 06/20/2021    ALT 17 07/30/2021    ALT 24 06/20/2021    AST 14 07/30/2021    AST 34 06/20/2021        INR RESULTS:  Lab Results   Component Value Date    INR 1.05 04/16/2021       Lab Results   Component Value Date    MAG 2.0 06/23/2021     Lab Results   Component Value Date    NTBNPI  7,364 (H) 06/20/2021     Lab Results   Component Value Date    NTBNP 22,172 (H) 02/09/2018       Diagnostics:  TTE 10/5/18  The visual ejection fraction is estimated at 35-40%.  There is mild-moderate global hypokinesia of the left ventricle.  Mildly decreased right ventricular systolic function  In comparison with the previous study the LVEF has improved slightly.    Assessment and Plan:   Mr. Manning is a 80 year old male with chronic system heart failure 2/2 ischemic cardiomyopathy who [place] [facility] [place]. He is here with his sister in law today . He looks to be hypovolemic and would benefit from stopping the lasix. I have asked them to call us with weight gain , SOB, swelling . They verbalize understanding.     # Chronic systolic heart failure/HFrEF secondary to ICM    Stage C. NYHA Class IIIA    Fluid status: appears hypovolemic on lasix 20 mg daily  ACEi/ARB/ARNI: Entresto 49/51 BID   BB: metoprolol  mg,  Aldosterone antagonist: defer  SCD prophylaxis: n/a EF>35%  NSAID use: contraindicated  Sleep apnea evaluation: deferred discussion  Remote monitoring: consider at future visits    # CAD s/p CABG  # PAD s/p iliac stenting and LE bypass  -no symptoms to suggest angina, he is on asa and statin (defer high potency to cardiologist); he has stable claudication    # Atrial flutter  -he is on BB and digoxin and warfarin    Plan:  Stop Lasix   Continue the increased Entresto dose 49/51 BID -   [facility]   [facility] in 3 weeks     20 minutes spent face-to-face with patient, >50% in counseling and/or coordination of care as described above        Lindsay GILBERT NP-C        [facility]

## 2021-07-30 NOTE — LETTER
7/30/2021      RE: Tad Manning  [address]  [facility] [place] [address]       Dear Colleague,    Thank you for the opportunity to participate in the care of your patient, Tad Manning, at the [facility] [place] [facility]. Please see a copy of my visit note below.    HPI:   Mr. 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 discitis and multple admission for decompensated heart failure. Presents to clinic for CORE f/u.    Since last visit with Dr Valdes, he reports feeling fine. He has home health RN 1-2 a week , PT, OT- 2 times a week. He has family involved in his care. He is taking the Entresto dose 24/26 BID - he didn't know about the increase Dr. Valdes had recommended until a day ago .  He just recently got the new prescription. He has not had issues with fluid retention. Remains on low dose diuretic. When he is fluid-up, he notes abdominal bloating but not recently.  He denies orthopnea, PND, LE edema. Patient denies any chest pain or shortness of breath with current level of exertion. His activity is restricted by his leg. Denies lightheadedness or orthostasis. No palpitations or syncope. He does not check BP at home. States he has not taken metoprolol in about one week because he ran out. He is moving to assisted living in August.     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 Overview:  Diagnosis updated by automated process. Provider to review and confirm. Formatting of this note might be different from the original. Diagnosis updated by automated process. Provider to review and confirm.     Hypertension      Long term current use of anticoagulant therapy 2018     Non-healing ulcer of foot (H)     Right     Noninflammatory pericardial effusion      Osteomyelitis (H) 2014     Osteomyelitis of foot, right, acute (H) 2/15/2017     Osteomyelitis of left foot (H)      PVD (peripheral vascular disease) (H)      Recurrent falls 2021     Sebaceous cyst        FAMILY HISTORY:  Family History   Problem Relation Age of Onset     Cancer Mother         leukemia     Leukemia Mother        SOCIAL HISTORY:  Socioeconomic History     Marital status: Single   Tobacco Use     Smoking status: Former Smoker     Packs/day: 2.00     Years: 30.00     Pack years: 60.00     Types: Cigarettes     Last attempt to quit: 1995     Years since quittin.5     Smokeless tobacco: Never Used   Other Topics Concern     Parent/sibling w/ CABG, MI or angioplasty  before 65F 55M? Not Asked   Social History Narrative    Lives independently with SO. 2/9/2017        CURRENT MEDICATIONS:  alcohol swab prep pads, Use to swab area of injection/jina as directed.  allopurinol (ZYLOPRIM) 300 MG tablet, Take 300 mg by mouth daily  apixaban ANTICOAGULANT (ELIQUIS ANTICOAGULANT) 5 MG tablet, Take 1 tablet (5 mg) by mouth 2 times daily  aspirin (ASA) 81 MG chewable tablet, CHEW AND SWALLOW 1 TABLET BY MOUTH DAILY  atorvastatin (LIPITOR) 20 MG tablet, Take 1 tablet (20 mg) by mouth daily  blood glucose (NO BRAND SPECIFIED) test strip, Use to test blood sugar 4 times daily or as directed.  blood glucose calibration (NO BRAND SPECIFIED) solution, 1 drop every 14 days Use 1 drop to calibrate blood glucose monitor every 14 days  blood glucose monitoring (NO BRAND SPECIFIED) meter device kit, Use to test blood sugar 4 times daily or as directed.  cholecalciferol 1000 UNITS TABS, Take 1,000 Units by mouth daily  citalopram (CELEXA) 20 MG tablet, Take 20 mg by mouth every evening  digoxin (LANOXIN) 125 MCG tablet, Take 1 tablet (125 mcg) by mouth daily  Ferrous Sulfate 324 (65 Fe) MG TBEC, Take 1 tablet (324 mg) by mouth daily  furosemide (LASIX) 20 MG tablet, Take 1 tablet (20 mg) by mouth daily  hydrocortisone (CORTIZONE-10) 1 % ointment, Apply topically 2 times daily as needed for itching  insulin aspart (NOVOLOG PEN) 100 UNIT/ML pen, Inject 9 Units Subcutaneous daily (with breakfast)  insulin aspart (NOVOLOG PEN) 100 UNIT/ML pen, Inject 11 Units Subcutaneous daily (with lunch)  insulin aspart (NOVOLOG PEN) 100 UNIT/ML pen, Inject 12 Units Subcutaneous daily (with dinner)  insulin glargine (LANTUS PEN) 100 UNIT/ML pen, Inject 30 Units Subcutaneous At Bedtime  magnesium 250 MG tablet, Take 2 tablets by mouth daily  metoprolol succinate ER (TOPROL-XL) 100 MG 24 hr tablet, Take 1 tablet (100 mg) by mouth daily  nitroglycerin (NITROSTAT) 0.4 MG sublingual tablet, For chest pain place 1 tablet  under the tongue every 5 minutes for 3 doses. If symptoms persist 5 minutes after 1st dose call 911.  pantoprazole (PROTONIX) 40 MG EC tablet, Take 40 mg by mouth daily  polyethylene glycol (MIRALAX/GLYCOLAX) Packet, Take 1 packet by mouth daily as needed   sacubitril-valsartan (ENTRESTO) 49-51 MG per tablet, Take 1 tablet by mouth 2 times daily  sodium chloride (EQ SALINE NASAL SPRAY) 0.65 % nasal spray, Spray 1 spray into both nostrils daily as needed for congestion  thin (NO BRAND SPECIFIED) lancets, 1 each 4 times daily Use with lanceting device.  traZODone (DESYREL) 50 MG tablet, Take 1 tablet (50 mg) by mouth nightly as needed for sleep  melatonin 3 MG tablet, Take 1 tablet (3 mg) by mouth every evening (Patient not taking: Reported on 7/12/2021)  melatonin 5 MG tablet, Take 5 mg by mouth At Bedtime (Patient not taking: Reported on 7/12/2021)  order for DME, Equipment to be ordered:  Scale- Qty 1  Commander Flex - Qty. 1  Pulse-Oximeter - Qty. 1  Use as directed (Patient not taking: Reported on 7/12/2021)  order for DME, Equipment being ordered: DH2 shoe (Patient not taking: Reported on 7/12/2021)  sacubitril-valsartan (ENTRESTO) 24-26 MG per tablet, Take 1 tablet by mouth 2 times daily (Patient not taking: Reported on 7/30/2021)  ULTICARE SHORT 31G X 8 MM insulin pen needle,   VENCLEXTA STARTING PACK 10 & 50 & 100 MG tablet,     No current facility-administered medications on file prior to visit.      ROS:   CONSTITUTIONAL: Denies fever, chills, fatigue, or weight fluctuations.   HEENT: Denies headache, vision changes, and changes in speech.   CV: Refer to HPI.   PULMONARY:Refer to HPI.   GI:Denies nausea, vomiting, diarrhea, and abdominal pain. Bowel movements are regular.   :Denies urinary alterations, dysuria, urinary frequency, hematuria, and abnormal drainage.   EXT:Denies lower extremity edema.   SKIN:Denies abnormal rashes or lesions.   MUSCULOSKELETAL:Denies upper or lower extremity weakness and  "pain.   NEUROLOGIC:Denies lightheadedness, dizziness, seizures, or upper or lower extremity paresthesia.     EXAM:  /61   Pulse 74   Ht 1.791 m (5' 10.5\")   Wt 79.8 kg (176 lb)   SpO2 99%   BMI 24.90 kg/m       GENERAL: Appears comfortable, in no acute distress.   HEENT: Eye symmetrical, no discharge or icterus bilaterally. Mucous membranes moist and without lesions.  CV: RRR, +S1S2, no murmur, rub, or gallop. JVP not visible at 90 degrees.   RESPIRATORY: Respirations regular, even, and unlabored. Lungs CTA throughout.   GI: Soft and non distended with normoactive bowel sounds present in all quadrants. No tenderness, rebound, guarding. No hepatomegaly.   EXTREMITIES: No peripheral edema. 2+ bilateral pedal pulses.   NEUROLOGIC: Alert and oriented x 3. No focal deficits.   MUSCULOSKELETAL: No joint swelling or tenderness.   SKIN: No jaundice. No rashes or lesions.     Labs, reviewed with patient in clinic today:  CBC RESULTS:  Lab Results   Component Value Date    WBC 3.7 (L) 06/26/2021    RBC 3.09 (L) 06/26/2021    HGB 9.6 (L) 06/26/2021    HCT 28.6 (L) 06/26/2021    MCV 93 06/26/2021    MCH 31.1 06/26/2021    MCHC 33.6 06/26/2021    RDW 14.6 06/26/2021     (L) 06/26/2021       CMP RESULTS:  Lab Results   Component Value Date     07/30/2021     06/28/2021    POTASSIUM 4.3 07/30/2021    POTASSIUM 4.1 06/28/2021    CHLORIDE 108 07/30/2021    CHLORIDE 109 06/28/2021    CO2 24 07/30/2021    CO2 24 06/28/2021    ANIONGAP 8 07/30/2021    ANIONGAP 4 06/28/2021     (H) 07/30/2021     (H) 06/28/2021    BUN 42 (H) 07/30/2021    BUN 43 (H) 06/28/2021    CR 1.44 (H) 07/30/2021    CR 1.33 (H) 06/28/2021    GFRESTIMATED 46 (L) 07/30/2021    GFRESTIMATED >60 07/08/2021    GFRESTIMATED 51 (L) 06/28/2021    GFRESTBLACK >60 07/08/2021    GFRESTBLACK 59 (L) 06/28/2021    JUSTINO 9.4 07/30/2021    JUSTINO 9.4 06/28/2021    BILITOTAL 0.6 07/30/2021    BILITOTAL 0.9 06/20/2021    ALBUMIN 3.2 (L) " 07/30/2021    ALBUMIN 3.3 (L) 06/20/2021    ALKPHOS 76 07/30/2021    ALKPHOS 72 06/20/2021    ALT 17 07/30/2021    ALT 24 06/20/2021    AST 14 07/30/2021    AST 34 06/20/2021        INR RESULTS:  Lab Results   Component Value Date    INR 1.05 04/16/2021       Lab Results   Component Value Date    MAG 2.0 06/23/2021     Lab Results   Component Value Date    NTBNPI 7,364 (H) 06/20/2021     Lab Results   Component Value Date    NTBNP 22,172 (H) 02/09/2018       Diagnostics:  TTE 10/5/18  The visual ejection fraction is estimated at 35-40%.  There is mild-moderate global hypokinesia of the left ventricle.  Mildly decreased right ventricular systolic function  In comparison with the previous study the LVEF has improved slightly.    Assessment and Plan:   Mr. Manning is a 80 year old male with chronic system heart failure 2/2 ischemic cardiomyopathy who presents to [facility] for follow up. He is here with his sister in law today . He looks to be hypovolemic and would benefit from stopping the lasix. I have asked them to call us with weight gain , SOB, swelling . They verbalize understanding.     # Chronic systolic heart failure/HFrEF secondary to ICM    Stage C. NYHA Class IIIA    Fluid status: appears hypovolemic on lasix 20 mg daily  ACEi/ARB/ARNI: Entresto 49/51 BID   BB: metoprolol  mg,  Aldosterone antagonist: defer  SCD prophylaxis: n/a EF>35%  NSAID use: contraindicated  Sleep apnea evaluation: deferred discussion  Remote monitoring: consider at future visits    # CAD s/p CABG  # PAD s/p iliac stenting and LE bypass  -no symptoms to suggest angina, he is on asa and statin (defer high potency to cardiologist); he has stable claudication    # Atrial flutter  -he is on BB and digoxin and warfarin    Plan:  Stop Lasix   Continue the increased Entresto dose 49/51 BID -   [facility] in [facility] weeks   CORE in 3 weeks     20 minutes spent face-to-face with patient, >50% in counseling and/or coordination of care as described  above        Lindsay GILBERT NP-C        CC  [facility]ON

## 2021-07-30 NOTE — PATIENT INSTRUCTIONS
Take your medicines every day, as directed    Changes made today:  o STOP taking Lasix  o    Monitor Your Weight and Symptoms    Contact us if you:      Gain 2 pounds in one day or 5 pounds in one week    Feel more short of breath    Notice more leg swelling    Feel lightheadeded   Change your lifestyle    Limit Salt or Sodium:    2000 mg  Limit Fluids:    2000 mL or approximately 64 ounces  Eat a Heart Healthy Diet    Low in saturated fats  Stay Active:    Aim to move at least 150 minutes every  week         To Contact us    During Business Hours:  [phone], option # 1 (University)  Then option # 4 (medical questions)     After hours, weekends or holidays:   [phone], Option #4  Ask to speak to the On-Call Cardiologist. Inform them you are a CORE/heart failure patient at the [facility].     Use [employer] allows you to communicate directly with your heart team through secure messaging.    [employer] can be accessed any time on your phone, computer, or tablet.    If you need assistance, we'd be happy to help!         Keep your Heart Appointments:    Labs in 2 weeks  CORE in 3 weeks (need for labs to be determined by 2 week labs)

## 2021-07-30 NOTE — NURSING NOTE
Diet: Patient instructed regarding a heart failure healthy diet, including discussion of reduced fat and 2000 mg daily sodium restriction, daily weights, medication purpose and compliance, fluid restrictions and resources for patient and family to access for assistance with heart failure management.       Labs: Patient was given results of the laboratory testing obtained today and patient was instructed about when to return for the next laboratory testing.     Med Reconcile: Reviewed and verified all current medications with the patient. The updated medication list was printed and given to the patient.     Med changes: continue with Entresto 49-51 mg BID    Return Appointment: Patient given instructions regarding scheduling next clinic visit: Labs in 2 weeks, CORE in 3 weeks    Patient stated he understood all health information given and agreed to call with further questions or concerns.     Taylor Arreola RN

## 2021-08-11 NOTE — PLAN OF CARE
Problem: Goal/Outcome  Goal: Goal Outcome Summary  Outcome: No Change     Patient opted to try Flexeril and Tylenol this evening instead of Oxycodone.  He stated that it helped him to relax overall, so he felt that it was effective.  He used the Oxycodone at HS. He does have increased discomfort with repositioning.    He has been able to make his needs known.  He has used urinal, and been continent.  He voices looking forward to getting to have a shower with OT in the morning.         electronic

## 2021-08-16 ENCOUNTER — PATIENT OUTREACH (OUTPATIENT)
Dept: CARDIOLOGY | Facility: CLINIC | Age: 81
End: 2021-08-16

## 2021-08-16 ENCOUNTER — LAB (OUTPATIENT)
Dept: LAB | Facility: CLINIC | Age: 81
End: 2021-08-16
Payer: COMMERCIAL

## 2021-08-16 DIAGNOSIS — I50.22 CHRONIC SYSTOLIC HEART FAILURE (H): ICD-10-CM

## 2021-08-16 LAB
ANION GAP SERPL CALCULATED.3IONS-SCNC: 6 MMOL/L (ref 3–14)
BUN SERPL-MCNC: 30 MG/DL (ref 7–30)
CALCIUM SERPL-MCNC: 9.4 MG/DL (ref 8.5–10.1)
CHLORIDE BLD-SCNC: 110 MMOL/L (ref 94–109)
CO2 SERPL-SCNC: 25 MMOL/L (ref 20–32)
CREAT SERPL-MCNC: 1.55 MG/DL (ref 0.66–1.25)
GFR SERPL CREATININE-BSD FRML MDRD: 42 ML/MIN/1.73M2
GLUCOSE BLD-MCNC: 228 MG/DL (ref 70–99)
POTASSIUM BLD-SCNC: 4.1 MMOL/L (ref 3.4–5.3)
SODIUM SERPL-SCNC: 141 MMOL/L (ref 133–144)

## 2021-08-16 PROCEDURE — 36415 COLL VENOUS BLD VENIPUNCTURE: CPT | Performed by: PATHOLOGY

## 2021-08-16 PROCEDURE — 80048 BASIC METABOLIC PNL TOTAL CA: CPT | Performed by: PATHOLOGY

## 2021-08-16 NOTE — TELEPHONE ENCOUNTER
Called Hima to see how he is feeling after stopping lasix and increasing Entresto. He denies any shortness of breath, weight gain, or fatigue. He got labs done today so we will let him know if we need additional labs prior to his 8/25 CORE return appointment. Hima verbalized agreement with this plan.    Cathi Gomez RN

## 2021-08-17 ENCOUNTER — PATIENT OUTREACH (OUTPATIENT)
Dept: CARDIOLOGY | Facility: CLINIC | Age: 81
End: 2021-08-17

## 2021-08-17 DIAGNOSIS — I50.22 CHRONIC SYSTOLIC HEART FAILURE (H): Primary | ICD-10-CM

## 2021-08-17 NOTE — TELEPHONE ENCOUNTER
"Called Hima to let him know we want labs before his CORE appointment 8/26. He was agreeable to the plan. Hima denied shortness of breath or fatigue after stopping lasix. He stated was feeling good and his daily weights have been \"level\". Lab appointment was scheduled and orders placed.    Cathi Gomez RN    "

## 2021-08-26 ENCOUNTER — OFFICE VISIT (OUTPATIENT)
Dept: CARDIOLOGY | Facility: CLINIC | Age: 81
End: 2021-08-26
Attending: PHYSICIAN ASSISTANT
Payer: COMMERCIAL

## 2021-08-26 ENCOUNTER — LAB (OUTPATIENT)
Dept: LAB | Facility: CLINIC | Age: 81
End: 2021-08-26
Payer: COMMERCIAL

## 2021-08-26 VITALS
SYSTOLIC BLOOD PRESSURE: 153 MMHG | DIASTOLIC BLOOD PRESSURE: 61 MMHG | WEIGHT: 182.3 LBS | HEIGHT: 71 IN | BODY MASS INDEX: 25.52 KG/M2 | OXYGEN SATURATION: 98 % | HEART RATE: 71 BPM

## 2021-08-26 DIAGNOSIS — I50.22 CHRONIC SYSTOLIC HEART FAILURE (H): ICD-10-CM

## 2021-08-26 LAB
ANION GAP SERPL CALCULATED.3IONS-SCNC: 6 MMOL/L (ref 3–14)
BUN SERPL-MCNC: 27 MG/DL (ref 7–30)
CALCIUM SERPL-MCNC: 9.4 MG/DL (ref 8.5–10.1)
CHLORIDE BLD-SCNC: 106 MMOL/L (ref 94–109)
CO2 SERPL-SCNC: 26 MMOL/L (ref 20–32)
CREAT SERPL-MCNC: 1.44 MG/DL (ref 0.66–1.25)
GFR SERPL CREATININE-BSD FRML MDRD: 46 ML/MIN/1.73M2
GLUCOSE BLD-MCNC: 287 MG/DL (ref 70–99)
POTASSIUM BLD-SCNC: 4.2 MMOL/L (ref 3.4–5.3)
SODIUM SERPL-SCNC: 138 MMOL/L (ref 133–144)

## 2021-08-26 PROCEDURE — 80048 BASIC METABOLIC PNL TOTAL CA: CPT | Performed by: PATHOLOGY

## 2021-08-26 PROCEDURE — 99214 OFFICE O/P EST MOD 30 MIN: CPT | Performed by: PHYSICIAN ASSISTANT

## 2021-08-26 PROCEDURE — 36415 COLL VENOUS BLD VENIPUNCTURE: CPT | Performed by: PATHOLOGY

## 2021-08-26 PROCEDURE — G0463 HOSPITAL OUTPT CLINIC VISIT: HCPCS

## 2021-08-26 RX ORDER — FUROSEMIDE 20 MG
10 TABLET ORAL DAILY
Qty: 45 TABLET | Refills: 1 | Status: ON HOLD | OUTPATIENT
Start: 2021-08-26 | End: 2021-09-25

## 2021-08-26 RX ORDER — FUROSEMIDE 20 MG
20 TABLET ORAL DAILY
COMMUNITY
Start: 2021-08-26 | End: 2021-08-26

## 2021-08-26 ASSESSMENT — PAIN SCALES - GENERAL: PAINLEVEL: NO PAIN (0)

## 2021-08-26 ASSESSMENT — MIFFLIN-ST. JEOR: SCORE: 1562.54

## 2021-08-26 NOTE — PROGRESS NOTES
In person appointment    HPI:   Mr. 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 discitis and multple admission for decompensated heart failure. Presents to clinic for CORE f/u.    Recently moved to [facility] in [place]. He has some help taking his medications there. Now not missing any medications. No Le edema that he has noticed. No abdominal edema. Breathing is pretty good. He can walk up and down the hallways at [facility]. Walking to the elevator and to the dining room doesn't make him feel ALEXIS. There is a length that he would walk that would make him feel ALEXIS, but he is having a hard time measuring that. He denies orthopnea, PND. Denies lightheadedness or orthostasis. No palpitations or syncope.     He has a scale but hasn't been checking his weights at home.    He has been working on improving his nutrition, so he may be gaining some calorie weight.    Cardiac Medications  Eliquis 5 mg BID  ASA 81 mg daily  Nitrolgycerin prn- none recently  Atorvastatin 20 mg daily  Metoprolol succinate 100 mg daily  Digoxin 125 mcg daily  Entresto 49-51 mg BID  Lasix 20 mgdaily    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)      Chronic systolic heart failure (H)  2017     Confusion 2021     Coronary artery disease     triple bypass      CRF (chronic renal failure), stage 3 (moderate) 2017     Critical lower limb ischemia 1/10/2014     Diabetes mellitus (H)      Diabetes mellitus, type 2 (H) 2014     Diabetic polyneuropathy (H)      Discitis 2017     Epidural abscess 2017     Fall 2017     History of blood transfusion      HTN (hypertension) 2014     Hyperglycemia 2020     Hyperlipidaemia      Hyperlipidemia with target LDL less than 100 2014    Formatting of this note might be different from the original. Overview:  Diagnosis updated by automated process. Provider to review and confirm. Formatting of this note might be different from the original. Diagnosis updated by automated process. Provider to review and confirm.     Hypertension      Long term current use of anticoagulant therapy 2018     Non-healing ulcer of foot (H)     Right     Noninflammatory pericardial effusion      Osteomyelitis (H) 2014     Osteomyelitis of foot, right, acute (H) 2/15/2017     Osteomyelitis of left foot (H)      PVD (peripheral vascular disease) (H)      Recurrent falls 2021     Sebaceous cyst        FAMILY HISTORY:  Family History   Problem Relation Age of Onset     Cancer Mother         leukemia     Leukemia Mother        SOCIAL HISTORY:  Socioeconomic History     Marital status: Single   Tobacco Use     Smoking status: Former Smoker     Packs/day: 2.00     Years: 30.00     Pack years: 60.00     Types: Cigarettes     Last attempt to quit: 1995     Years since quittin.5     Smokeless tobacco: Never Used   Other Topics Concern     Parent/sibling w/ CABG, MI or angioplasty before 65F 55M? Not Asked   Social History Narrative    Lives independently with SO. 2017        CURRENT MEDICATIONS:  alcohol swab prep pads, Use to swab area of injection/jina as directed.  apixaban ANTICOAGULANT (ELIQUIS ANTICOAGULANT) 5 MG  tablet, Take 1 tablet (5 mg) by mouth 2 times daily  aspirin (ASA) 81 MG chewable tablet, CHEW AND SWALLOW 1 TABLET BY MOUTH DAILY  atorvastatin (LIPITOR) 20 MG tablet, Take 1 tablet (20 mg) by mouth daily  blood glucose (NO BRAND SPECIFIED) test strip, Use to test blood sugar 4 times daily or as directed.  blood glucose calibration (NO BRAND SPECIFIED) solution, 1 drop every 14 days Use 1 drop to calibrate blood glucose monitor every 14 days  blood glucose monitoring (NO BRAND SPECIFIED) meter device kit, Use to test blood sugar 4 times daily or as directed.  cholecalciferol 1000 UNITS TABS, Take 1,000 Units by mouth daily  citalopram (CELEXA) 20 MG tablet, Take 20 mg by mouth every evening  digoxin (LANOXIN) 125 MCG tablet, Take 1 tablet (125 mcg) by mouth daily  insulin aspart (NOVOLOG PEN) 100 UNIT/ML pen, Inject 9 Units Subcutaneous daily (with breakfast)  insulin aspart (NOVOLOG PEN) 100 UNIT/ML pen, Inject 11 Units Subcutaneous daily (with lunch)  insulin aspart (NOVOLOG PEN) 100 UNIT/ML pen, Inject 12 Units Subcutaneous daily (with dinner)  insulin glargine (LANTUS PEN) 100 UNIT/ML pen, Inject 30 Units Subcutaneous At Bedtime  magnesium 250 MG tablet, Take 2 tablets by mouth daily  melatonin 3 MG tablet, Take 1 tablet (3 mg) by mouth every evening  metoprolol succinate ER (TOPROL-XL) 100 MG 24 hr tablet, Take 1 tablet (100 mg) by mouth daily  nitroglycerin (NITROSTAT) 0.4 MG sublingual tablet, For chest pain place 1 tablet under the tongue every 5 minutes for 3 doses. If symptoms persist 5 minutes after 1st dose call 911.  order for DME, Equipment to be ordered:  Scale- Qty 1  Commander Flex - Qty. 1  Pulse-Oximeter - Qty. 1  Use as directed  order for DME, Equipment being ordered: DH2 shoe  pantoprazole (PROTONIX) 40 MG EC tablet, Take 40 mg by mouth daily  polyethylene glycol (MIRALAX/GLYCOLAX) Packet, Take 1 packet by mouth daily as needed   thin (NO BRAND SPECIFIED) lancets, 1 each 4 times daily Use with  "lanceting device.  traZODone (DESYREL) 50 MG tablet, Take 1 tablet (50 mg) by mouth nightly as needed for sleep  ULTICARE SHORT 31G X 8 MM insulin pen needle,   VENCLEXTA STARTING PACK 10 & 50 & 100 MG tablet,   allopurinol (ZYLOPRIM) 300 MG tablet, Take 300 mg by mouth daily (Patient not taking: Reported on 8/26/2021)  hydrocortisone (CORTIZONE-10) 1 % ointment, Apply topically 2 times daily as needed for itching  melatonin 5 MG tablet, Take 5 mg by mouth At Bedtime (Patient not taking: Reported on 7/12/2021)    No current facility-administered medications on file prior to visit.      ROS:   See HPI    EXAM:  BP (!) 153/61 (BP Location: Right arm, Patient Position: Chair, Cuff Size: Adult Large)   Pulse 71   Ht 1.793 m (5' 10.59\")   Wt 82.7 kg (182 lb 4.8 oz)   SpO2 98%   BMI 25.72 kg/m       GENERAL: Appears comfortable, in no acute distress.   HEENT: Eye symmetrical, no discharge or icterus bilaterally. Mucous membranes moist and without lesions.  CV: RRR, +S1S2, no murmur, rub, or gallop. JVP just above clavicle at 90 degrees.   RESPIRATORY: Respirations regular, even, and unlabored. Lungs CTA throughout.   GI: Soft and non distended with normoactive bowel sounds present in all quadrants. No tenderness, rebound, guarding. No hepatomegaly.   EXTREMITIES: No peripheral edema. 2+ bilateral pedal pulses.   NEUROLOGIC: Alert and interacting appropriately. No focal deficits.   MUSCULOSKELETAL: No joint swelling or tenderness.   SKIN: No jaundice. No rashes or lesions.     Labs, reviewed with patient in clinic today:  CBC RESULTS:  Lab Results   Component Value Date    WBC 3.7 (L) 06/26/2021    RBC 3.09 (L) 06/26/2021    HGB 9.6 (L) 06/26/2021    HCT 28.6 (L) 06/26/2021    MCV 93 06/26/2021    MCH 31.1 06/26/2021    MCHC 33.6 06/26/2021    RDW 14.6 06/26/2021     (L) 06/26/2021       CMP RESULTS:  Lab Results   Component Value Date     08/26/2021     06/28/2021    POTASSIUM 4.2 08/26/2021    " POTASSIUM 4.1 06/28/2021    CHLORIDE 106 08/26/2021    CHLORIDE 109 06/28/2021    CO2 26 08/26/2021    CO2 24 06/28/2021    ANIONGAP 6 08/26/2021    ANIONGAP 4 06/28/2021     (H) 08/26/2021     (H) 06/28/2021    BUN 27 08/26/2021    BUN 43 (H) 06/28/2021    CR 1.44 (H) 08/26/2021    CR 1.33 (H) 06/28/2021    GFRESTIMATED 46 (L) 08/26/2021    GFRESTIMATED >60 07/08/2021    GFRESTIMATED 51 (L) 06/28/2021    GFRESTBLACK >60 07/08/2021    GFRESTBLACK 59 (L) 06/28/2021    JUSTINO 9.4 08/26/2021    JUSTINO 9.4 06/28/2021    BILITOTAL 0.6 07/30/2021    BILITOTAL 0.9 06/20/2021    ALBUMIN 3.2 (L) 07/30/2021    ALBUMIN 3.3 (L) 06/20/2021    ALKPHOS 76 07/30/2021    ALKPHOS 72 06/20/2021    ALT 17 07/30/2021    ALT 24 06/20/2021    AST 14 07/30/2021    AST 34 06/20/2021        INR RESULTS:  Lab Results   Component Value Date    INR 1.05 04/16/2021       Lab Results   Component Value Date    MAG 2.0 06/23/2021     Lab Results   Component Value Date    NTBNPI 7,364 (H) 06/20/2021     Lab Results   Component Value Date    NTBNP 22,172 (H) 02/09/2018       Diagnostics:  TTE 6/20/21  1. Left ventricular systolic function is severely reduced. The visual ejection  fraction is estimated at 20-25%.  2. The right ventricle is normal in structure, function and size.  3. The left atrium is moderately dilated.  4. The aortic valve is trileaflet with aortic valve sclerosis.  5. There is mild (1+) mitral regurgitation.  ______________________________________________________________________________      TTE 10/5/18  The visual ejection fraction is estimated at 35-40%.  There is mild-moderate global hypokinesia of the left ventricle.  Mildly decreased right ventricular systolic function  In comparison with the previous study the LVEF has improved slightly.    Assessment and Plan:   Mr. Manning is a 80 year old male with a past medical history including CLL in remission, CAD s/p CABG in 1995, HFrEF 2/2 ICM, atrial flutter, PVD s/p iliac  artery stents and left lower extremity bypass, hypertension, diabetes, hyperlipidemia, prosthesis on leg for about 2-3 years now. discitis and multple admission for decompensated heart failure. Presents to clinic for CORE f/u.    Patient is recently moved into assisted living which is led to a lot more consistency in taking his medications.  At his last core appointment his Lasix was stopped because of concern for hypovolemia.  However when talking with his assisted-living nurse it does appear that he has been getting treadmill 20 mg of Lasix per day.  He actually appears near euvolemic today.  I think that he is also eating better so getting some caloric weight.  However he needs increase Entresto due to his blood pressure so we will back off on his Lasix today to give us some fluid room for that.  His creatinine is still slightly above but overall not far from his baseline.    His EF has worsened on his most recent echo.  It is now 20 to 25%.  We will titrate his neurohormonal blockade.  Then we will recheck an echo.  If he remains below 35% we will need to discuss ICD.    # Chronic systolic heart failure/HFrEF secondary to ICM  (Ef 20-25%)  Stage C. NYHA Class IIIA    Fluid status: decrease lasix to 10 mg daily given we are increasing entresto  ACEi/ARB/ARNI: Entresto 49/51 BID - increase to 1.5 of these tabs BID  BB: metoprolol  mg,  Aldosterone antagonist: defer  SCD prophylaxis: n/a EF now below 35%- will need to consider if not improving with CHF med titration  NSAID use: contraindicated  Sleep apnea evaluation: not discussed today  Remote monitoring: consider at future visits    # CAD s/p CABG  # PAD s/p iliac stenting and LE bypass  -no anginal symptoms, he is on asa and statin (defer high potency to cardiologist); he has stable claudication    # Atrial flutter  - he is on BB and digoxin and warfarin    Plan:  - BMP in 2 weeks with RN phone call (increased entresto, decreased lasix)  - CORE clinic one  month    Billing  - I managed 2+ stable chronic conditions  - I changed a prescription medication        Carly Browne PA-C  [facility]        CC  FABY LEON

## 2021-08-26 NOTE — PATIENT INSTRUCTIONS
Take your medicines every day, as directed    Changes made today:  o Need to take a daily weight  o Decrease lasix to 10 mg daily  o Increase Entresto to 1.5 tabs of the 49-51 mg twice a day   Monitor Your Weight and Symptoms    Contact us if you:      Gain 2 pounds in one day or 5 pounds in one week    Feel more short of breath    Notice more leg swelling    Feel lightheadeded   Change your lifestyle    Limit Salt or Sodium:    2000 mg  Limit Fluids:    2000 mL or approximately 64 ounces  Eat a Heart Healthy Diet    Low in saturated fats  Stay Active:    Aim to move at least 150 minutes every  week         To Contact us    During Business Hours:  [phone], option # 1 (University)  Then option # 4 (medical questions)     After hours, weekends or holidays:   [phone], Option #4  Ask to speak to the On-Call Cardiologist. Inform them you are a CORE/heart failure patient at the [facility].     Use [employer] allows you to communicate directly with your heart team through secure messaging.    [employer] can be accessed any time on your phone, computer, or tablet.    If you need assistance, we'd be happy to help!         Keep your Heart Appointments:    - BMP in 2 weeks with RN phone call  - CORE clinic one month

## 2021-08-26 NOTE — NURSING NOTE
Chief Complaint   Patient presents with     Follow Up     80 year old with chronic systolic heart failure presents for follow up with labs prior.      Vitals were taken and medications reconciled.    Dane Sandoval, EMT  10:29 AM

## 2021-08-26 NOTE — LETTER
8/26/2021      RE: Tad Manning  [address]  [facility] [address]       Dear Colleague,    Thank you for the opportunity to participate in the care of your patient, Tad Manning, at the [facility] [place] [facility]. Please see a copy of my visit note below.    In person appointment    HPI:   Mr. 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 discitis and multple admission for decompensated heart failure. Presents to clinic for CORE f/u.    Recently moved to [facility] in [place]. He has some help taking his medications there. Now not missing any medications. No Le edema that he has noticed. No abdominal edema. Breathing is pretty good. He can walk up and down the hallways at [facility]. Walking to the elevator and to the dining room doesn't make him feel ALEXIS. There is a length that he would walk that would make him feel ALEXIS, but he is having a hard time measuring that. He denies orthopnea, PND. Denies lightheadedness or orthostasis. No palpitations or syncope.     He has a scale but hasn't been checking his weights at home.    He has been working on improving his nutrition, so he may be gaining some calorie weight.    Cardiac Medications  Eliquis 5 mg BID  ASA 81 mg daily  Nitrolgycerin prn- none recently  Atorvastatin 20 mg daily  Metoprolol succinate 100 mg daily  Digoxin 125 mcg daily  Entresto 49-51 mg BID  Lasix 20 mgdaily    PAST MEDICAL HISTORY:  Past Medical History:   Diagnosis Date     ACS (acute coronary syndrome) (H) 6/20/2021     Acute respiratory failure with hypoxia (H) 4/6/2017     STORM (acute kidney injury) (H) 9/22/2018     Arthritis      ASCVD  (arteriosclerotic cardiovascular disease)      Atrial fibrillation (H) 2/5/2018     Atrial flutter (H) 2/22/2017     BMI 30.0-30.9,adult      BPH (benign prostatic hypertrophy)      CAD S/P percutaneous coronary angioplasty 1/11/2014     Cellulitis      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)      Chronic systolic heart failure (H) 6/6/2017     Confusion 6/20/2021     Coronary artery disease     triple bypass 1995     CRF (chronic renal failure), stage 3 (moderate) 1/20/2017     Critical lower limb ischemia 1/10/2014     Diabetes mellitus (H)      Diabetes mellitus, type 2 (H) 1/11/2014     Diabetic polyneuropathy (H)      Discitis 2/8/2017     Epidural abscess 2/9/2017     Fall 2/7/2017     History of blood transfusion      HTN (hypertension) 1/11/2014     Hyperglycemia 1/9/2020     Hyperlipidaemia      Hyperlipidemia with target LDL less than 100 1/11/2014    Formatting of this note might be different from the original. Overview:  Diagnosis updated by automated process. Provider to review and confirm. Formatting of this note might be different from the original. Diagnosis updated by automated process. Provider to review and confirm.     Hypertension      Long term current use of anticoagulant therapy 2/5/2018     Non-healing ulcer of foot (H)     Right     Noninflammatory pericardial effusion      Osteomyelitis (H) 1/11/2014     Osteomyelitis of foot, right, acute (H) 2/15/2017     Osteomyelitis of left foot (H)      PVD (peripheral vascular disease) (H)      Recurrent falls 6/20/2021     Sebaceous cyst        FAMILY HISTORY:  Family History   Problem Relation Age of Onset     Cancer Mother         leukemia     Leukemia Mother        SOCIAL HISTORY:  Socioeconomic History     Marital status: Single   Tobacco Use     Smoking status: Former Smoker     Packs/day: 2.00     Years: 30.00     Pack years: 60.00     Types: Cigarettes     Last attempt to quit: 6/19/1995     Years since  quittin.5     Smokeless tobacco: Never Used   Other Topics Concern     Parent/sibling w/ CABG, MI or angioplasty before 65F 55M? Not Asked   Social History Narrative    Lives independently with SO. 2017        CURRENT MEDICATIONS:  alcohol swab prep pads, Use to swab area of injection/jina as directed.  apixaban ANTICOAGULANT (ELIQUIS ANTICOAGULANT) 5 MG tablet, Take 1 tablet (5 mg) by mouth 2 times daily  aspirin (ASA) 81 MG chewable tablet, CHEW AND SWALLOW 1 TABLET BY MOUTH DAILY  atorvastatin (LIPITOR) 20 MG tablet, Take 1 tablet (20 mg) by mouth daily  blood glucose (NO BRAND SPECIFIED) test strip, Use to test blood sugar 4 times daily or as directed.  blood glucose calibration (NO BRAND SPECIFIED) solution, 1 drop every 14 days Use 1 drop to calibrate blood glucose monitor every 14 days  blood glucose monitoring (NO BRAND SPECIFIED) meter device kit, Use to test blood sugar 4 times daily or as directed.  cholecalciferol 1000 UNITS TABS, Take 1,000 Units by mouth daily  citalopram (CELEXA) 20 MG tablet, Take 20 mg by mouth every evening  digoxin (LANOXIN) 125 MCG tablet, Take 1 tablet (125 mcg) by mouth daily  insulin aspart (NOVOLOG PEN) 100 UNIT/ML pen, Inject 9 Units Subcutaneous daily (with breakfast)  insulin aspart (NOVOLOG PEN) 100 UNIT/ML pen, Inject 11 Units Subcutaneous daily (with lunch)  insulin aspart (NOVOLOG PEN) 100 UNIT/ML pen, Inject 12 Units Subcutaneous daily (with dinner)  insulin glargine (LANTUS PEN) 100 UNIT/ML pen, Inject 30 Units Subcutaneous At Bedtime  magnesium 250 MG tablet, Take 2 tablets by mouth daily  melatonin 3 MG tablet, Take 1 tablet (3 mg) by mouth every evening  metoprolol succinate ER (TOPROL-XL) 100 MG 24 hr tablet, Take 1 tablet (100 mg) by mouth daily  nitroglycerin (NITROSTAT) 0.4 MG sublingual tablet, For chest pain place 1 tablet under the tongue every 5 minutes for 3 doses. If symptoms persist 5 minutes after 1st dose call 911.  order for DME, Equipment  "to be ordered:  Scale- Qty 1  Commander Flex - Qty. 1  Pulse-Oximeter - Qty. 1  Use as directed  order for DME, Equipment being ordered: DH2 shoe  pantoprazole (PROTONIX) 40 MG EC tablet, Take 40 mg by mouth daily  polyethylene glycol (MIRALAX/GLYCOLAX) Packet, Take 1 packet by mouth daily as needed   thin (NO BRAND SPECIFIED) lancets, 1 each 4 times daily Use with lanceting device.  traZODone (DESYREL) 50 MG tablet, Take 1 tablet (50 mg) by mouth nightly as needed for sleep  ULTICARE SHORT 31G X 8 MM insulin pen needle,   VENCLEXTA STARTING PACK 10 & 50 & 100 MG tablet,   allopurinol (ZYLOPRIM) 300 MG tablet, Take 300 mg by mouth daily (Patient not taking: Reported on 8/26/2021)  hydrocortisone (CORTIZONE-10) 1 % ointment, Apply topically 2 times daily as needed for itching  melatonin 5 MG tablet, Take 5 mg by mouth At Bedtime (Patient not taking: Reported on 7/12/2021)    No current facility-administered medications on file prior to visit.      ROS:   See HPI    EXAM:  BP (!) 153/61 (BP Location: Right arm, Patient Position: Chair, Cuff Size: Adult Large)   Pulse 71   Ht 1.793 m (5' 10.59\")   Wt 82.7 kg (182 lb 4.8 oz)   SpO2 98%   BMI 25.72 kg/m       GENERAL: Appears comfortable, in no acute distress.   HEENT: Eye symmetrical, no discharge or icterus bilaterally. Mucous membranes moist and without lesions.  CV: RRR, +S1S2, no murmur, rub, or gallop. JVP just above clavicle at 90 degrees.   RESPIRATORY: Respirations regular, even, and unlabored. Lungs CTA throughout.   GI: Soft and non distended with normoactive bowel sounds present in all quadrants. No tenderness, rebound, guarding. No hepatomegaly.   EXTREMITIES: No peripheral edema. 2+ bilateral pedal pulses.   NEUROLOGIC: Alert and interacting appropriately. No focal deficits.   MUSCULOSKELETAL: No joint swelling or tenderness.   SKIN: No jaundice. No rashes or lesions.     Labs, reviewed with patient in clinic today:  CBC RESULTS:  Lab Results "   Component Value Date    WBC 3.7 (L) 06/26/2021    RBC 3.09 (L) 06/26/2021    HGB 9.6 (L) 06/26/2021    HCT 28.6 (L) 06/26/2021    MCV 93 06/26/2021    MCH 31.1 06/26/2021    MCHC 33.6 06/26/2021    RDW 14.6 06/26/2021     (L) 06/26/2021       CMP RESULTS:  Lab Results   Component Value Date     08/26/2021     06/28/2021    POTASSIUM 4.2 08/26/2021    POTASSIUM 4.1 06/28/2021    CHLORIDE 106 08/26/2021    CHLORIDE 109 06/28/2021    CO2 26 08/26/2021    CO2 24 06/28/2021    ANIONGAP 6 08/26/2021    ANIONGAP 4 06/28/2021     (H) 08/26/2021     (H) 06/28/2021    BUN 27 08/26/2021    BUN 43 (H) 06/28/2021    CR 1.44 (H) 08/26/2021    CR 1.33 (H) 06/28/2021    GFRESTIMATED 46 (L) 08/26/2021    GFRESTIMATED >60 07/08/2021    GFRESTIMATED 51 (L) 06/28/2021    GFRESTBLACK >60 07/08/2021    GFRESTBLACK 59 (L) 06/28/2021    JUSTINO 9.4 08/26/2021    JUSTINO 9.4 06/28/2021    BILITOTAL 0.6 07/30/2021    BILITOTAL 0.9 06/20/2021    ALBUMIN 3.2 (L) 07/30/2021    ALBUMIN 3.3 (L) 06/20/2021    ALKPHOS 76 07/30/2021    ALKPHOS 72 06/20/2021    ALT 17 07/30/2021    ALT 24 06/20/2021    AST 14 07/30/2021    AST 34 06/20/2021        INR RESULTS:  Lab Results   Component Value Date    INR 1.05 04/16/2021       Lab Results   Component Value Date    MAG 2.0 06/23/2021     Lab Results   Component Value Date    NTBNPI 7,364 (H) 06/20/2021     Lab Results   Component Value Date    NTBNP 22,172 (H) 02/09/2018       Diagnostics:  TTE 6/20/21  1. Left ventricular systolic function is severely reduced. The visual ejection  fraction is estimated at 20-25%.  2. The right ventricle is normal in structure, function and size.  3. The left atrium is moderately dilated.  4. The aortic valve is trileaflet with aortic valve sclerosis.  5. There is mild (1+) mitral regurgitation.  ______________________________________________________________________________      TTE 10/5/18  The visual ejection fraction is estimated at  35-40%.  There is mild-moderate global hypokinesia of the left ventricle.  Mildly decreased right ventricular systolic function  In comparison with the previous study the LVEF has improved slightly.    Assessment and Plan:   Mr. 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 discitis and multple admission for decompensated heart failure. Presents to clinic for CORE f/u.    Patient is recently moved into assisted living which is led to a lot more consistency in taking his medications.  At his last core appointment his Lasix was stopped because of concern for hypovolemia.  However when talking with his assisted-living nurse it does appear that he has been getting treadmill 20 mg of Lasix per day.  He actually appears near euvolemic today.  I think that he is also eating better so getting some caloric weight.  However he needs increase Entresto due to his blood pressure so we will back off on his Lasix today to give us some fluid room for that.  His creatinine is still slightly above but overall not far from his baseline.    His EF has worsened on his most recent echo.  It is now 20 to 25%.  We will titrate his neurohormonal blockade.  Then we will recheck an echo.  If he remains below 35% we will need to discuss ICD.    # Chronic systolic heart failure/HFrEF secondary to ICM  (Ef 20-25%)  Stage C. NYHA Class IIIA    Fluid status: decrease lasix to 10 mg daily given we are increasing entresto  ACEi/ARB/ARNI: Entresto 49/51 BID - increase to 1.5 of these tabs BID  BB: metoprolol  mg,  Aldosterone antagonist: defer  SCD prophylaxis: n/a EF now below 35%- will need to consider if not improving with CHF med titration  NSAID use: contraindicated  Sleep apnea evaluation: not discussed  today  Remote monitoring: consider at future visits    # CAD s/p CABG  # PAD s/p iliac stenting and LE bypass  -no anginal symptoms, he is on asa and statin (defer high potency to cardiologist); he has stable claudication    # Atrial flutter  - he is on BB and digoxin and warfarin    Plan:  - BMP in 2 weeks with RN phone call (increased entresto, decreased lasix)  - CORE clinic one month    Billing  - I managed 2+ stable chronic conditions  - I changed a prescription medication        Carly Browne PA-C  [facility]        CC  FABY LEON      Please do not hesitate to contact me if you have any questions/concerns.     Sincerely,     Carly Lomas PA-C

## 2021-08-26 NOTE — NURSING NOTE
Diet: Patient instructed regarding a heart healthy diet, including discussion of reduced fat and sodium intake. Patient demonstrated understanding of this information and agreed to call with further questions or concerns.  Labs: Patient was given results of the laboratory testing obtained today. Patient was instructed to return for the next laboratory testing in 2 weeks with RN phone call . Patient demonstrated understanding of this information and agreed to call with further questions or concerns.   Return Appointment: Patient given instructions regarding scheduling next clinic visit. Patient demonstrated understanding of this information and agreed to call with further questions or concerns.  Follow up in CORE Clinic in one month   Medication Change: Patient was educated regarding prescribed medication change, including discussion of the indication, administration, side effects, and when to report to MD or RN. Patient demonstrated understanding of this information and agreed to call with further questions or concerns.  Increase Entresto to 1.5 tabs twice daily  Decrease Lasix to 10 mg daily.   Daily weights - orders faxed     Orders faxed to [facility] RN and left message for her with updated orders.     Patient stated he understood all health information given and agreed to call with further questions or concerns.   Verónica Jarquin, RN

## 2021-08-27 ENCOUNTER — TELEPHONE (OUTPATIENT)
Dept: CARDIOLOGY | Facility: CLINIC | Age: 81
End: 2021-08-27

## 2021-08-27 NOTE — TELEPHONE ENCOUNTER
Prior Authorization Approval    Authorization Effective Date: 5/29/2021  Authorization Expiration Date: 8/27/2022  Medication: sacubitril-valsartan (ENTRESTO) 49-51 MG per tablet--APPROVED  Approved Dose/Quantity:   Reference #:     Insurance Company: [employer] - Phone [phone] Fax [phone]  Expected CoPay:       CoPay Card Available:      Foundation Assistance Needed:    Which Pharmacy is filling the prescription (Not needed for infusion/clinic administered): [facility][id number] [address] [facility] [address]  Pharmacy Notified: Yes  Patient Notified: Yes **Instructed pharmacy to notify patient when script is ready to /ship.**

## 2021-08-27 NOTE — TELEPHONE ENCOUNTER
PA Initiation    Medication: sacubitril-valsartan (ENTRESTO) 49-51 MG per tablet   Insurance Company: [employer] - Phone [phone] Fax [phone]  Pharmacy Filling the Rx: [facility] #[id number] [place] [address] [address] AT [facility] [address]  Filling Pharmacy Phone: [phone]  Filling Pharmacy Fax: [phone]  Start Date: 8/27/2021

## 2021-08-27 NOTE — TELEPHONE ENCOUNTER
Prior Authorization Retail Medication Request    Medication/Dose: sacubitril-valsartan (ENTRESTO) 49-51 MG per tablet  ICD code (if different than what is on RX):  Chronic systolic heart failure (H) [I50.22]   Previously Tried and Failed:    Rationale:      Insurance Name:  [facility]  Insurance ID: [id number]    Pharmacy Information (if different than what is on RX)  Name:  [facility] #[id number] [place] [address] [address] AT [facility] [address]  Phone:  [phone]

## 2021-08-30 DIAGNOSIS — I50.22 CHRONIC SYSTOLIC HEART FAILURE (H): ICD-10-CM

## 2021-09-01 RX ORDER — SACUBITRIL AND VALSARTAN 49; 51 MG/1; MG/1
TABLET, FILM COATED ORAL
Qty: 180 TABLET | Refills: 3 | Status: SHIPPED | OUTPATIENT
Start: 2021-09-01 | End: 2021-10-01 | Stop reason: DRUGHIGH

## 2021-09-09 ENCOUNTER — PATIENT OUTREACH (OUTPATIENT)
Dept: CARDIOLOGY | Facility: CLINIC | Age: 81
End: 2021-09-09

## 2021-09-09 NOTE — TELEPHONE ENCOUNTER
Called home nurse [facility] to follow up on labs, weights and blood pressures after medication changes in clinic 2 weeks ago. Decreased Lasix, increased Entresto.  Also due for labs.   Left voicemail and asked for call back. Verónica Jarquin RN

## 2021-09-09 NOTE — TELEPHONE ENCOUNTER
Call back from Deb[facility]. They got all the orders from fax 2 weeks ago except did not get order to draw labs. Their lab day is Thursday and is only day lab person comes out. Will review with provider if labs can wait till next Thursday or if should ask Tad to go out to clinic to get labs.     They haven't veen checking BP often but most recent BP on 9/5 was 135/68.   She reports he's been maintaining weights. Most recent weights have been between 190-192.  8/28 was 187.8.     Called Tad to see how he is feeling. Left message and asked for call back.     She reports long term they can't do daily weights as they are an Assisted Living and would like order to discontinue daily weights. They report they can do weekly weights and weekly BPs if we want to order   Verónica Jarquin RN

## 2021-09-10 NOTE — TELEPHONE ENCOUNTER
Spoke with [facility], they got lab orders. Per nurse they won't result until Monday afternoon. They will fax a copy of results. Verónica Jarquin RN

## 2021-09-10 NOTE — TELEPHONE ENCOUNTER
Called Hima to follow up on getting labs sooner. He reports he has an appt with [facility] today and is supposed to get labs. Called [facility] at [phone] and they requested faxed orders to [phone].   Orders faxed.     Hima reports that he is feeling well, has been tired. No worsening SOB. Reports his L leg is a little swelled up (chronically is, though he states its maybe a little worse than normal).   Will await lab results. Verónica Jarquin RN

## 2021-09-13 NOTE — TELEPHONE ENCOUNTER
CAlled and spoke to patient. Taked him through some issues. Also called [employer] and they will call him as well to trouble shoot.     Ronaldo Trent [facility]   CORE/Heart Failure   Advanced Heart Failure Referral Coordinator  [facility]      Referred by: Albino Villalobos DC; Medical Diagnosis (from order):    Diagnosis Information      Diagnosis    723.1 (ICD-9-CM) - M54.2 (ICD-10-CM) - Neck pain    719.41 (ICD-9-CM) - M25.511, M25.512 (ICD-10-CM) - Bilateral shoulder pain, unspecified chronicity                Initial Evaluation -  Physical Therapy    Visit:  1   Chart reviewed at time of initial evaluation (relevant co-morbidities, allergies, tests and medications listed): No past medical history on file.    SUBJECTIVE                                                                                                               Patient presents to therapy with reports of bilateral upper shoulder and neck pain, exacerbated with activities, swimming and sustained postures. Patient reports insidious onset, however seemed to worsen with swim loads at school and club. Symptoms date back to April, however seemed to be worse about 2 weeks prior to taking time off of swimming. Patient swims year round with no cross training. Denies diplopia, dysphagia, dysarthria, dizziness, drop attacks, nystagmus, numbness to limbs or facial paraesthesias, no changes to bowel/bladder function.   Pain / Symptoms:  Pain rating (out of 10): Current: 3 ; Best: 0; Worst: 6Location: Bilateral upper shoulders/cervical region    Quality / Description: ache, pressure, sore.  Alleviating Factors: avoiding movement in involved area.   Progression since onset: no change  Function:   Limitations / Exacerbation Factors: difficulty, increased time, pain, computer tasks  Prior Level of Function: pain free ADLs and IADLs,  Prior treatment: no therapies  Discharged from hospital, home health, or skilled nursing facility in last 30 days: no    Home Environment:   Patient lives with parent or legal guardian  Assistance available: consistent  Feels safe at home/work/school.  2 or more falls or an unexplained fall with injury in the last year:  No    OBJECTIVE                                                                                                                      Observation:   Cervical: forward head  Shoulder:     • Left Scapula: anteriorly tipped, depressed and downwardly rotated    • Right Scapula: anteriorly tipped, depressed and downwardly rotated  Spine: increased thoracic kyphosis  Range of Motion (ROM)   (degrees unless noted; active unless noted; norms in ( ); negative=lacking to 0, positive=beyond 0)   Shoulder:     - Flexion (180):        • Left: WNL         • Right: WNL     - Abduction (180):        • Left: WNL         • Right: WNL  Cervical:    - Flexion (45-50): 50%    - Extension (45-60): 75%    - Rotation (60-80):        • Left: 55°        • Right: 65°  Details / Comments: Increased hinge noted with cervical extension   Mild anterior shear noted visually at C5/6 with forward flexion     Screen(s):   Shoulder: Positive, See upper extremity section for details    Strength  (out of 5 unless noted, standard test position unless noted, lbs tested with hand held dynamometer)   Shoulder:     - Elevation:         • Left (C4): 4+        • Right (C4): 4+    - Flexion:         • Left: within normal limits         • Right: within normal limits     - Abduction:        • Left: within normal limits        • Right: within normal limits        • Left (C5): 4+        • Right (C5): 4+    - Internal Rotation:          • Left (at 0°): within normal limits        • Right (at 0°): within normal limits        • Left (at 45°): within normal limits        • Right (at 45°): within normal limits        • Left (at 90°): within normal limits        • Right (at 90°): within normal limits        • Left (C6/7): 5        • Right (C6/7): 5    - External Rotation:         • Left (at 0°): within normal limits        • Right (at 0°) :5, within normal limits         • Left (at 45°): within normal limits        • Right (at 45°): 5, within normal limits        • Left (at 90°): within normal limits        • Right (at  90°): 5, within normal limits        • Left (C6/7): 4+          • Right (C6/7): 4+    - Rhomboids Left: 4    - Rhomboids Right: 4    - Middle Trapezius Left: 4    - Middle Trapezius Right: 4    - Lower Trapezius Left: 3    - Lower Trapezius Right: 3    - Serratus Anterior Left: 3    - Serratus Anterior Right: 3  Elbow/Forearm:    - Flexion:        • Left (C6): 4+        • Right (C6): 4+    - Extension:        • Left (C7): 5        • Right (C7): 5  Wrist:    - Flexion:        • Left (C7): 4+        • Right (C7): 4+    - Extension:        • Left (C6): 4+        • Right (C6): 4+    - Thumb extension:        • Left (C8): 4+        • Right (C8): 4+    - Finger Abduction:        • Left (T1): 4+        • Right (T1): 4+    Deep Tendon Reflexes:   Biceps (C5): Left: 2+ (normal);  Right: 2+ (normal)  Brachioradialis (C6): Left: 2+ (normal);  Right: 2+ (normal)  Triceps (C7): Left: 2+ (normal); Right: 2+ (normal)  Reflex Testing  Babinski: Left: absent;  Right: absent  Clonus:Left: absent;  Right: absent  Crespo's Sign: Left: absent;  Right: absent        Joint Play:   Shoulder:     - Anterior capsule: Left: hypermobile;  Right: hypermobile    - Posterior capsule: Left: hypomobile;  Right: hypomobile    - Inferior capsule: Left: hypomobile;  Right: hypomobile  Cervical Spine:     - O-A:  Left: hypomobile Right: hypomobile    - A-A: Left: hypomobile Right: hypomobile    - C2-C3: Left: hypomobile Right: hypomobile    - C5-C6: Left: hypermobile Right: hypermobile    - C7-T1: Left: hypomobile Right: hypomobile  Thoracic Spine:     - Left: hypomobile    - Right: hypomobile  Rib:     - 1:  Left: hypomobileRight: hypomobile  Comments/Details: 2nd rib limited involved side    Special Tests:  Alar Ligament: Left: negative Right: negative  Transverse Ligament: Left: negative Right: negative  Vertebral Artery: Left: negative Right: negative  Comments / Details:  Functional Tests for Shoulder Girdle:    Scapular Unloading Test:  positive  Positive test indicates decreased symptoms with (B) scapula placed in ideal anatomical position    Forward Flexion Test for Anterior Glide, Medial Rotation Syndrome of Glenohumeral Joint: positive    Scapular Upward Rotation Test: positive   Positive test indicates that inferior angle not reaching mid-axillary line indicating decreased scapular upward rotation      Upper Cervical Stability Testing  Alar ligament: negative for instability of alar ligament  Transverse ligament: negative for instability of transverse ligament/atlantoaxial articulation  Sustained Rotation Test: negative  Alfred's Fracture: negative        Outcome Measures:   Neck Disability Index (NDI): Neck Disability Index Score: 13  NDI Total Possible Score: 50  NDI Score Calculated: 26 %  (scored 0-100, higher score indicates higher disability) see flowsheet for additional documentation    TREATMENT                                                                                                                initial evaluation completed    Patient was educated on plan of care. Patient was educated on anatomy and kinematics of cervical spine and related structures. Patient verbalized understanding. Discussed with patient that pain is a combination of multiple contributing factors, and that restoring normal strength, motion, joint mobility and performing home exercise program as instructed is essential to reach rehab potential. Patient educated on the importance of attending therapy sessions routinely and understands that the patient may have impaired mobility and possibly increased pain if the patient does not attend therapy or do their HEP as prescribed.  Patient was educated and performed HEP.       Skilled input: verbal instruction/cues, posture correction, facilitation, inhibition and tactile instruction/cues    Writer verbally educated and received verbal consent for hand placement, positioning of patient, and techniques to be  performed today from patient for clothing adjustments for techniques as described above and how they are pertinent to the patient's plan of care.    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 Limited proximal stabilizers evident.     Rehabilitative potential is: good  Predicted patient presentation: Low (stable) - Patient comorbidities and complexities, as defined above, will have little effect on progress for prescribed plan of care.  Patient Education:   Who will be receiving education: patient and patient's parent(s)  Preferred learning style: written, verbal and demonstration  Barriers to learning: no barriers apparent at this time  Results of above outlined education: Verbalizes understanding, Demonstrates understanding and Needs reinforcement      PLAN                                                                                                                           Frequency / Duration: 2 times per week tapering as patient progresses for an estimated total of 16 visits for 8 weeks    Patient involved in and agreed to plan of care and goals.  Patient given attendance policy at time of initial evaluation.  Suggestions for next session as indicated: Manual prn, continue with scapular re-training       GOALS                                                                                                                           Long Term Goals: to be met by end of plan of care  1. Patient will sit for 90 minutes for ability to sit in classroom without increased pain  2. Patient will be able to swim and return to swim  practice without increased pain  3. Patient will be able to check blind spots in car without increased pain  4. Patient will be independent with progressed and modified home exercise program.  5. Neck Disability Index: Patient will complete form to reflect an improved score to less than or equal to 0% to indicate patient reported improvement in function/disability/impairment (minimal detectable change: 21%).      Therapy procedure time and total treatment time can be found documented on the Time Entry flowsheet

## 2021-09-15 NOTE — TELEPHONE ENCOUNTER
Still have not seen lab results, called [facility] and had them fax again.   Will have entered into Epic but copying into note for provider review:

## 2021-09-15 NOTE — TELEPHONE ENCOUNTER
Called Alessandro to check in and review labs. Feels well   Swelling in leg is still there in left leg. No swelling in R leg.     Called [place] home care nurse for weights and blood pressures. She is not in office and will call me with these numbers tomorrow.   She would like order to discontinue daily weights and BP as they are not able to do this and if he needs this he needs to go to a skilled facility.   She reports they can do weekly BP and weekly weight and would need order.     Called alessandro and he reports that he doesn't have a scale at home right now.   Will update provider. Verónica Jarquin RN

## 2021-09-16 NOTE — TELEPHONE ENCOUNTER
Reviewed with Deb JENNINGS. Orders faxed for weekly weights and BP. (ideally would want daily weights but this is the most [facility] nurses will do long term).   Called Debby to update her and request latest BP and weights. Left voicemail and asked for call back. Verónica Jarquin RN

## 2021-09-19 ENCOUNTER — HEALTH MAINTENANCE LETTER (OUTPATIENT)
Age: 81
End: 2021-09-19

## 2021-09-20 NOTE — TELEPHONE ENCOUNTER
Reviewed with provider. No changes for now as he is feeling better. Continue to monitor. Reviewed this with [facility] care nurse. Advised her to call us if he is having these symptoms more frequently, otherwise they will continue to monitor BP/weight weekly. Verónica Jarquin RN

## 2021-09-20 NOTE — TELEPHONE ENCOUNTER
[facility] nurse called me back. She reports that the last few days Hima has been complaining of feeling lightheaded/dizzy.  Having some more labored breathing.   Saturday - LS clear. RLL some slight crackles  Family thought he was lightheaded and encouraged some fluids.   Weights have been stable -   17th - 193.8. has been around 192-195 range  Blood pressure yesterday - 166/75. Usually was running 135-150s.   Blood sugars have been 153-167.   Felt better after he ate.     Called Tad to further assess.   Tad reports yesterday he felt dizzy.   No dizziness at all today. Admits he was a little SOB yesterday but his breathing feels better today. Asked if he feels like he is back at his baseline and he said yes.   Will update provider for any further recommendations. Has CORE follow up next week on 9/30

## 2021-09-20 NOTE — TELEPHONE ENCOUNTER
Received voicemail back from Debby requesting return call. Called her back. Left voicemail and asked for call back. Verónica Jarquin RN

## 2021-09-22 ENCOUNTER — HOSPITAL ENCOUNTER (INPATIENT)
Facility: CLINIC | Age: 81
LOS: 3 days | Discharge: HOME | DRG: 291 | End: 2021-09-25
Attending: EMERGENCY MEDICINE | Admitting: INTERNAL MEDICINE
Payer: COMMERCIAL

## 2021-09-22 ENCOUNTER — APPOINTMENT (OUTPATIENT)
Dept: RADIOLOGY | Facility: CLINIC | Age: 81
DRG: 291 | End: 2021-09-22
Attending: EMERGENCY MEDICINE
Payer: COMMERCIAL

## 2021-09-22 DIAGNOSIS — I50.9 ACUTE ON CHRONIC CONGESTIVE HEART FAILURE, UNSPECIFIED HEART FAILURE TYPE (H): ICD-10-CM

## 2021-09-22 DIAGNOSIS — R09.02 HYPOXIA: ICD-10-CM

## 2021-09-22 DIAGNOSIS — I50.22 CHRONIC SYSTOLIC HEART FAILURE (H): Primary | ICD-10-CM

## 2021-09-22 DIAGNOSIS — L03.032 CELLULITIS OF THIRD TOE OF LEFT FOOT: ICD-10-CM

## 2021-09-22 DIAGNOSIS — K59.01 SLOW TRANSIT CONSTIPATION: ICD-10-CM

## 2021-09-22 LAB
ALBUMIN SERPL-MCNC: 3.3 G/DL (ref 3.5–5)
ALBUMIN UR-MCNC: 200 MG/DL
ALP SERPL-CCNC: 89 U/L (ref 45–120)
ALT SERPL W P-5'-P-CCNC: 14 U/L (ref 0–45)
ANION GAP SERPL CALCULATED.3IONS-SCNC: 11 MMOL/L (ref 5–18)
APPEARANCE UR: CLEAR
AST SERPL W P-5'-P-CCNC: 19 U/L (ref 0–40)
ATRIAL RATE - MUSE: 84 BPM
BACTERIA #/AREA URNS HPF: ABNORMAL /HPF
BASOPHILS # BLD AUTO: 0 10E3/UL (ref 0–0.2)
BASOPHILS NFR BLD AUTO: 1 %
BILIRUB SERPL-MCNC: 0.8 MG/DL (ref 0–1)
BILIRUB UR QL STRIP: NEGATIVE
BNP SERPL-MCNC: 2340 PG/ML (ref 0–82)
BUN SERPL-MCNC: 18 MG/DL (ref 8–28)
CALCIUM SERPL-MCNC: 9.6 MG/DL (ref 8.5–10.5)
CHLORIDE BLD-SCNC: 108 MMOL/L (ref 98–107)
CO2 SERPL-SCNC: 22 MMOL/L (ref 22–31)
COLOR UR AUTO: ABNORMAL
CREAT SERPL-MCNC: 1.39 MG/DL (ref 0.7–1.3)
DIASTOLIC BLOOD PRESSURE - MUSE: NORMAL MMHG
DIGOXIN SERPL-MCNC: 0.8 UG/L
EOSINOPHIL # BLD AUTO: 0 10E3/UL (ref 0–0.7)
EOSINOPHIL NFR BLD AUTO: 1 %
ERYTHROCYTE [DISTWIDTH] IN BLOOD BY AUTOMATED COUNT: 17.3 % (ref 10–15)
GFR SERPL CREATININE-BSD FRML MDRD: 48 ML/MIN/1.73M2
GLUCOSE BLD-MCNC: 61 MG/DL (ref 70–125)
GLUCOSE BLDC GLUCOMTR-MCNC: 128 MG/DL (ref 70–99)
GLUCOSE UR STRIP-MCNC: NEGATIVE MG/DL
HCT VFR BLD AUTO: 31.4 % (ref 40–53)
HGB BLD-MCNC: 9.9 G/DL (ref 13.3–17.7)
HGB UR QL STRIP: ABNORMAL
IMM GRANULOCYTES # BLD: 0 10E3/UL
IMM GRANULOCYTES NFR BLD: 1 %
INTERPRETATION ECG - MUSE: NORMAL
KETONES UR STRIP-MCNC: NEGATIVE MG/DL
LEUKOCYTE ESTERASE UR QL STRIP: NEGATIVE
LYMPHOCYTES # BLD AUTO: 0.6 10E3/UL (ref 0.8–5.3)
LYMPHOCYTES NFR BLD AUTO: 14 %
MAGNESIUM SERPL-MCNC: 1.8 MG/DL (ref 1.8–2.6)
MCH RBC QN AUTO: 30.1 PG (ref 26.5–33)
MCHC RBC AUTO-ENTMCNC: 31.5 G/DL (ref 31.5–36.5)
MCV RBC AUTO: 95 FL (ref 78–100)
MONOCYTES # BLD AUTO: 0.6 10E3/UL (ref 0–1.3)
MONOCYTES NFR BLD AUTO: 14 %
MUCOUS THREADS #/AREA URNS LPF: PRESENT /LPF
NEUTROPHILS # BLD AUTO: 3 10E3/UL (ref 1.6–8.3)
NEUTROPHILS NFR BLD AUTO: 69 %
NITRATE UR QL: NEGATIVE
NRBC # BLD AUTO: 0 10E3/UL
NRBC BLD AUTO-RTO: 0 /100
P AXIS - MUSE: 89 DEGREES
PH UR STRIP: 6 [PH] (ref 5–7)
PLATELET # BLD AUTO: 145 10E3/UL (ref 150–450)
POTASSIUM BLD-SCNC: 4.4 MMOL/L (ref 3.5–5)
PR INTERVAL - MUSE: 232 MS
PROT SERPL-MCNC: 6.4 G/DL (ref 6–8)
QRS DURATION - MUSE: 136 MS
QT - MUSE: 388 MS
QTC - MUSE: 458 MS
R AXIS - MUSE: -32 DEGREES
RBC # BLD AUTO: 3.29 10E6/UL (ref 4.4–5.9)
RBC URINE: 1 /HPF
SARS-COV-2 RNA RESP QL NAA+PROBE: NEGATIVE
SODIUM SERPL-SCNC: 141 MMOL/L (ref 136–145)
SP GR UR STRIP: 1.01 (ref 1–1.03)
SYSTOLIC BLOOD PRESSURE - MUSE: NORMAL MMHG
T AXIS - MUSE: 118 DEGREES
TROPONIN I SERPL-MCNC: 0.06 NG/ML (ref 0–0.29)
UROBILINOGEN UR STRIP-MCNC: <2 MG/DL
VENTRICULAR RATE- MUSE: 84 BPM
WBC # BLD AUTO: 4.2 10E3/UL (ref 4–11)
WBC URINE: 1 /HPF

## 2021-09-22 PROCEDURE — 99207 PR CDG-CODE CATEGORY CHANGED: CPT | Performed by: INTERNAL MEDICINE

## 2021-09-22 PROCEDURE — 250N000011 HC RX IP 250 OP 636: Performed by: EMERGENCY MEDICINE

## 2021-09-22 PROCEDURE — 210N000002 HC R&B HEART CARE

## 2021-09-22 PROCEDURE — 85025 COMPLETE CBC W/AUTO DIFF WBC: CPT | Performed by: EMERGENCY MEDICINE

## 2021-09-22 PROCEDURE — 36415 COLL VENOUS BLD VENIPUNCTURE: CPT | Performed by: EMERGENCY MEDICINE

## 2021-09-22 PROCEDURE — 83735 ASSAY OF MAGNESIUM: CPT | Performed by: INTERNAL MEDICINE

## 2021-09-22 PROCEDURE — 83880 ASSAY OF NATRIURETIC PEPTIDE: CPT | Performed by: EMERGENCY MEDICINE

## 2021-09-22 PROCEDURE — 250N000013 HC RX MED GY IP 250 OP 250 PS 637: Performed by: INTERNAL MEDICINE

## 2021-09-22 PROCEDURE — 80053 COMPREHEN METABOLIC PANEL: CPT | Performed by: EMERGENCY MEDICINE

## 2021-09-22 PROCEDURE — U0005 INFEC AGEN DETEC AMPLI PROBE: HCPCS | Performed by: EMERGENCY MEDICINE

## 2021-09-22 PROCEDURE — C9803 HOPD COVID-19 SPEC COLLECT: HCPCS

## 2021-09-22 PROCEDURE — 93005 ELECTROCARDIOGRAM TRACING: CPT | Performed by: EMERGENCY MEDICINE

## 2021-09-22 PROCEDURE — 84484 ASSAY OF TROPONIN QUANT: CPT | Performed by: EMERGENCY MEDICINE

## 2021-09-22 PROCEDURE — 81001 URINALYSIS AUTO W/SCOPE: CPT | Performed by: EMERGENCY MEDICINE

## 2021-09-22 PROCEDURE — 71045 X-RAY EXAM CHEST 1 VIEW: CPT

## 2021-09-22 PROCEDURE — 80162 ASSAY OF DIGOXIN TOTAL: CPT | Performed by: EMERGENCY MEDICINE

## 2021-09-22 PROCEDURE — 96374 THER/PROPH/DIAG INJ IV PUSH: CPT

## 2021-09-22 PROCEDURE — 99223 1ST HOSP IP/OBS HIGH 75: CPT | Performed by: INTERNAL MEDICINE

## 2021-09-22 PROCEDURE — 99285 EMERGENCY DEPT VISIT HI MDM: CPT | Mod: 25

## 2021-09-22 RX ORDER — TRAZODONE HYDROCHLORIDE 50 MG/1
50 TABLET, FILM COATED ORAL AT BEDTIME
COMMUNITY

## 2021-09-22 RX ORDER — DIGOXIN 125 MCG
125 TABLET ORAL DAILY
Status: DISCONTINUED | OUTPATIENT
Start: 2021-09-23 | End: 2021-09-25 | Stop reason: HOSPADM

## 2021-09-22 RX ORDER — ACETAMINOPHEN 325 MG/1
650 TABLET ORAL EVERY 4 HOURS PRN
Status: DISCONTINUED | OUTPATIENT
Start: 2021-09-22 | End: 2021-09-25 | Stop reason: HOSPADM

## 2021-09-22 RX ORDER — ACETAMINOPHEN 325 MG/1
650 TABLET ORAL EVERY 4 HOURS PRN
COMMUNITY
End: 2022-03-24 | Stop reason: ALTCHOICE

## 2021-09-22 RX ORDER — DEXTROSE MONOHYDRATE 25 G/50ML
25-50 INJECTION, SOLUTION INTRAVENOUS
Status: DISCONTINUED | OUTPATIENT
Start: 2021-09-22 | End: 2021-09-23

## 2021-09-22 RX ORDER — INSULIN LISPRO 100 [IU]/ML
18 INJECTION, SOLUTION INTRAVENOUS; SUBCUTANEOUS
COMMUNITY

## 2021-09-22 RX ORDER — METFORMIN HYDROCHLORIDE 750 MG/1
750 TABLET, EXTENDED RELEASE ORAL EVERY MORNING
COMMUNITY

## 2021-09-22 RX ORDER — LOPERAMIDE HYDROCHLORIDE 2 MG/1
2-4 TABLET ORAL 4 TIMES DAILY PRN
COMMUNITY

## 2021-09-22 RX ORDER — NICOTINE POLACRILEX 4 MG
15-30 LOZENGE BUCCAL
Status: DISCONTINUED | OUTPATIENT
Start: 2021-09-22 | End: 2021-09-23

## 2021-09-22 RX ORDER — INSULIN LISPRO 100 [IU]/ML
16 INJECTION, SOLUTION INTRAVENOUS; SUBCUTANEOUS
COMMUNITY

## 2021-09-22 RX ORDER — PANTOPRAZOLE SODIUM 20 MG/1
40 TABLET, DELAYED RELEASE ORAL DAILY
Status: DISCONTINUED | OUTPATIENT
Start: 2021-09-23 | End: 2021-09-25 | Stop reason: HOSPADM

## 2021-09-22 RX ORDER — VITAMIN B COMPLEX
1000 TABLET ORAL DAILY
Status: DISCONTINUED | OUTPATIENT
Start: 2021-09-23 | End: 2021-09-25 | Stop reason: HOSPADM

## 2021-09-22 RX ORDER — BISACODYL 10 MG
10 SUPPOSITORY, RECTAL RECTAL DAILY PRN
COMMUNITY

## 2021-09-22 RX ORDER — CITALOPRAM HYDROBROMIDE 20 MG/1
20 TABLET ORAL EVERY MORNING
Status: DISCONTINUED | OUTPATIENT
Start: 2021-09-23 | End: 2021-09-25 | Stop reason: HOSPADM

## 2021-09-22 RX ORDER — ASPIRIN 81 MG/1
81 TABLET, CHEWABLE ORAL DAILY
Status: DISCONTINUED | OUTPATIENT
Start: 2021-09-23 | End: 2021-09-25 | Stop reason: HOSPADM

## 2021-09-22 RX ORDER — ALLOPURINOL 300 MG/1
300 TABLET ORAL DAILY
Status: DISCONTINUED | OUTPATIENT
Start: 2021-09-23 | End: 2021-09-23

## 2021-09-22 RX ORDER — ATORVASTATIN CALCIUM 10 MG/1
20 TABLET, FILM COATED ORAL DAILY
Status: DISCONTINUED | OUTPATIENT
Start: 2021-09-23 | End: 2021-09-25 | Stop reason: HOSPADM

## 2021-09-22 RX ORDER — TRAZODONE HYDROCHLORIDE 50 MG/1
50 TABLET, FILM COATED ORAL AT BEDTIME
Status: DISCONTINUED | OUTPATIENT
Start: 2021-09-22 | End: 2021-09-25 | Stop reason: HOSPADM

## 2021-09-22 RX ORDER — NYSTATIN 100000 [USP'U]/G
POWDER TOPICAL 2 TIMES DAILY PRN
COMMUNITY

## 2021-09-22 RX ORDER — NEOMYCIN/BACITRACIN/POLYMYXINB 3.5-400-5K
1 OINTMENT (GRAM) TOPICAL 3 TIMES DAILY PRN
COMMUNITY
End: 2022-03-24 | Stop reason: ALTCHOICE

## 2021-09-22 RX ORDER — LIDOCAINE 40 MG/G
CREAM TOPICAL
Status: DISCONTINUED | OUTPATIENT
Start: 2021-09-22 | End: 2021-09-25 | Stop reason: HOSPADM

## 2021-09-22 RX ORDER — FUROSEMIDE 20 MG/1
10 TABLET ORAL DAILY
Status: DISCONTINUED | OUTPATIENT
Start: 2021-09-23 | End: 2021-09-23

## 2021-09-22 RX ORDER — FUROSEMIDE 10 MG/ML
40 INJECTION INTRAMUSCULAR; INTRAVENOUS ONCE
Status: COMPLETED | OUTPATIENT
Start: 2021-09-22 | End: 2021-09-22

## 2021-09-22 RX ADMIN — TRAZODONE HYDROCHLORIDE 50 MG: 50 TABLET ORAL at 23:21

## 2021-09-22 RX ADMIN — APIXABAN 5 MG: 5 TABLET, FILM COATED ORAL at 23:21

## 2021-09-22 RX ADMIN — SACUBITRIL AND VALSARTAN 1 TABLET: 24; 26 TABLET, FILM COATED ORAL at 23:20

## 2021-09-22 RX ADMIN — FUROSEMIDE 40 MG: 10 INJECTION, SOLUTION INTRAMUSCULAR; INTRAVENOUS at 20:56

## 2021-09-22 RX ADMIN — SACUBITRIL AND VALSARTAN 1 TABLET: 49; 51 TABLET, FILM COATED ORAL at 23:20

## 2021-09-22 ASSESSMENT — ENCOUNTER SYMPTOMS
DIARRHEA: 0
NERVOUS/ANXIOUS: 1
UNEXPECTED WEIGHT CHANGE: 1
WEAKNESS: 1
CONFUSION: 1
VOMITING: 0
CHILLS: 0
DIZZINESS: 0
SHORTNESS OF BREATH: 1
DYSURIA: 0
JOINT SWELLING: 0
HEMATURIA: 0
FEVER: 0
NAUSEA: 0
BACK PAIN: 1
ABDOMINAL PAIN: 0
SORE THROAT: 0
MYALGIAS: 0
COUGH: 0
HEADACHES: 0

## 2021-09-22 ASSESSMENT — MIFFLIN-ST. JEOR: SCORE: 1620.74

## 2021-09-22 NOTE — ED TRIAGE NOTES
Pt here with CHF exacerbation, SOB since Sunday, pt also being treated for CLL.  Denies pain.  Chandni Fuller RN.......9/22/2021 6:07 PM

## 2021-09-22 NOTE — ED PROVIDER NOTES
Emergency Department Encounter     Evaluation Date & Time:   9/22/2021  6:26 PM    CHIEF COMPLAINT:  Heart Failure      Triage Note:Pt here with CHF exacerbation, SOB since Sunday, pt also being treated for CLL.  Denies pain.  Chandni Fuller RN.......9/22/2021 6:07 PM        ED COURSE & MEDICAL DECISION MAKING:     ED Course as of Sep 23 0315   Wed Sep 22, 2021   2004 WBC wnl.  Hgb 9.9, stable compared to previous.      2005 CXR reviewed.      2052 Trop wnl.  BNP grossly elevated to 2340, consistent with history/exam, CXR. Will treat with IV lasix, hospitalize.  Renal function baseline.          6:41 PM I met with the patient to gather history and to perform my initial exam. We discussed plans for the ED course, including diagnostic testing and treatment. Pt with a history of CAD/CHF/Afib on eliquis, presenting with niece for increased dyspnea over past several days and in particular worse since last night. Pt reports some associated anxiety, found to be borderline hypoxic to 90%, improved with 2L NC O2.  Pt denies, cough, fevers, chest pain or known covid exposure and is fully vaccinated for Covid since March.  Pt nontoxic appearing, afebrile. Will get labs, anticipate hospitalization.  Pt understands we have no beds here and may be boarded in ED overnight.  Pt is anticoagulated, very unlikely PE.  EKG similar to previous. Will get trop to rule out ACS.    8:57 PM I rechecked and updated the patient with results.  9:07 PM I discussed the patient with Dr. Almonte from the hospitalist service who agrees to admit the patient.         I wore an N95 mask, gloves, and eye protection during all patient encounters.  The patient was wearing a surgical mask during the encounters.        At the conclusion of the encounter I discussed the results of all the tests and the disposition. The questions were answered. The patient or family acknowledged understanding and was agreeable with the care plan.      MEDICATIONS GIVEN IN  THE EMERGENCY DEPARTMENT:  Medications   acetaminophen (TYLENOL) tablet 650 mg (has no administration in time range)   allopurinol (ZYLOPRIM) tablet 300 mg (has no administration in time range)   apixaban ANTICOAGULANT (ELIQUIS) tablet 5 mg (5 mg Oral Given 9/22/21 2321)   aspirin (ASA) chewable tablet 81 mg (has no administration in time range)   atorvastatin (LIPITOR) tablet 20 mg (has no administration in time range)   Vitamin D3 (CHOLECALCIFEROL) tablet 1,000 Units (has no administration in time range)   citalopram (celeXA) tablet 20 mg (has no administration in time range)   digoxin (LANOXIN) tablet 125 mcg (has no administration in time range)   furosemide (LASIX) half-tab 10 mg (has no administration in time range)   guaiFENesin (ROBITUSSIN) solution 10 mL (has no administration in time range)   insulin glargine (LANTUS PEN) injection 30 Units (has no administration in time range)   pantoprazole (PROTONIX) EC tablet 40 mg (has no administration in time range)   traZODone (DESYREL) tablet 50 mg (50 mg Oral Given 9/22/21 2321)   venetoclax (VENCLEXTA) tablet 200 mg (has no administration in time range)   lidocaine 1 % 0.1-1 mL (has no administration in time range)   lidocaine (LMX4) cream (has no administration in time range)   sodium chloride (PF) 0.9% PF flush 3 mL (3 mLs Intracatheter Given 9/22/21 2320)   sodium chloride (PF) 0.9% PF flush 3 mL (has no administration in time range)   Patient is already receiving anticoagulation with heparin, enoxaparin (LOVENOX), warfarin (COUMADIN)  or other anticoagulant medication (has no administration in time range)   glucose gel 15-30 g (has no administration in time range)     Or   dextrose 50 % injection 25-50 mL (has no administration in time range)     Or   glucagon injection 1 mg (has no administration in time range)   sacubitril-valsartan (ENTRESTO) 49-51 MG per tablet 1 tablet (1 tablet Oral Given 9/22/21 2320)     And   sacubitril-valsartan (ENTRESTO) 24-26 MG  "per tablet 1 tablet (1 tablet Oral Given 9/22/21 2320)   furosemide (LASIX) injection 40 mg (40 mg Intravenous Given 9/22/21 2056)       NEW PRESCRIPTIONS STARTED AT TODAY'S ED VISIT:  Current Discharge Medication List          HPI   HPI     Tad Manning is a 80 year old male with a pertinent history of CHF, AFIB on Eliquis, HTN, HLD, CAD s/p x3 stent placement on 81 mg ASA, PVD, DM2, CKD3, s/p right BKA, and CLL on chemotherapy (per patient), who presents to this ED via private vehicle for evaluation of shortness of breath.    Patient reports shortness of breath which began 4 days ago (9/18). States it was mild over the weekend but worsened in the last 2 days, to the point where he has had difficulty sleeping at night. States his current shortness of breath feels worse than usual CHF related shortness of breath. Patient reports it is making him feel anxious, which may in turn make him more short of breath.    Endorses mild leg swelling and recent weight gain(a few pounds every 1-2 weeks, per patient). He has been drinking fluids but states he is urinating less than usual. Patient reports he has been compliant with all of his medications. He does not use supplemental O2 at home. Also reports intermittent back pain which he attributes to chronic back issues. No acute changes.     Niece reports he has been \"foggy\" and mildly confused recently. She states the patient has had similar episodes of confusion in the past that were attributed to dehydration. Additionally, niece and patient agree that he has been weaker than usual lately.     Patient denies cough, chest pain, fever, headache, body aches, loss of taste or smell. Denies any recent travel. Denies history of DVT/PE. Patient was vaccinated for COVID in March 2021. Denies any recent COVID exposures.    Of note, niece states the patient has CLL and undergoes infusions once every 6 weeks. His most recent infusion was on 9/10/21.       REVIEW OF SYSTEMS:  Review " of Systems   Constitutional: Positive for unexpected weight change. Negative for chills and fever.   HENT: Negative for sore throat.    Eyes: Negative for visual disturbance.   Respiratory: Positive for shortness of breath. Negative for cough.    Cardiovascular: Positive for leg swelling. Negative for chest pain.   Gastrointestinal: Negative for abdominal pain, diarrhea, nausea and vomiting.   Endocrine: Negative for polyuria.   Genitourinary: Negative for dysuria and hematuria.        - urinary changes     Musculoskeletal: Positive for back pain (chronic). Negative for joint swelling and myalgias.   Skin: Negative for rash.   Neurological: Positive for weakness. Negative for dizziness and headaches.        Negative for loss of taste and smell.   Psychiatric/Behavioral: Positive for confusion (per niece). The patient is nervous/anxious.    All other systems reviewed and are negative.      Medical History     Past Medical History:   Diagnosis Date     ACS (acute coronary syndrome) (H) 6/20/2021     Acute respiratory failure with hypoxia (H) 4/6/2017     STORM (acute kidney injury) (H) 9/22/2018     Arthritis      ASCVD (arteriosclerotic cardiovascular disease)      Atrial fibrillation (H) 2/5/2018     Atrial flutter (H) 2/22/2017     BMI 30.0-30.9,adult      BPH (benign prostatic hypertrophy)      CAD S/P percutaneous coronary angioplasty 1/11/2014     Cellulitis      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)      Chronic systolic heart failure (H) 6/6/2017     Confusion 6/20/2021     Coronary artery disease      CRF (chronic renal failure), stage 3 (moderate) 1/20/2017     Critical lower limb ischemia 1/10/2014     Diabetes mellitus (H)      Diabetes mellitus, type 2 (H) 1/11/2014     Diabetic polyneuropathy (H)      Discitis 2/8/2017     Epidural abscess 2/9/2017     Fall 2/7/2017     History of blood transfusion      HTN (hypertension) 1/11/2014     Hyperglycemia 1/9/2020      Hyperlipidaemia      Hyperlipidemia with target LDL less than 100 1/11/2014     Hypertension      Long term current use of anticoagulant therapy 2/5/2018     Non-healing ulcer of foot (H)      Noninflammatory pericardial effusion      Osteomyelitis (H) 1/11/2014     Osteomyelitis of foot, right, acute (H) 2/15/2017     Osteomyelitis of left foot (H)      PVD (peripheral vascular disease) (H)      Recurrent falls 6/20/2021     Sebaceous cyst        Past Surgical History:   Procedure Laterality Date     AMPUTATE LEG BELOW KNEE Right 10/8/2018    Procedure: AMPUTATE LEG BELOW KNEE;  OPEN RIGHT LOWER LEG AMPUTATION WITH WOUND VAC PLACEMENT    EBL: 50mL;  Surgeon: Amador Vick MD;  Location:  OR     AMPUTATE REVISION STUMP LOWER EXTREMITY Right 10/11/2018    Procedure: AMPUTATE REVISION STUMP LOWER EXTREMITY;  CLOSURE RIGHT BELOW KNEE AMPUTATION;  Surgeon: Amador Vick MD;  Location:  OR     AMPUTATE TOE(S)  1/10/2014    Procedure: AMPUTATE TOE(S);;  Surgeon: Amador Vick MD;  Location:  OR     APPLY WOUND VAC Right 10/8/2018    Procedure: APPLY WOUND VAC;;  Surgeon: Amador Vick MD;  Location:  OR     BONE MARROW BIOPSY, BONE SPECIMEN, NEEDLE/TROCAR  10/19/2012    Procedure: BIOPSY BONE MARROW;  BONE MARROW BIOPSY  (CONSCIOUS SED);  Surgeon: Ramno Quesada MD;  Location:  GI     BYPASS GRAFT FEMOROPOPLITEAL  1/10/2014    Procedure: BYPASS GRAFT FEMOROPOPLITEAL;  Left above knee popliteal to  Below knee popliteal bypass using transverse saphenous vein graft. Left 2nd Toe amputation;  Surgeon: Amador Vick MD;  Location:  OR     CARDIAC SURGERY      angioplasty with stent, triple bypass     CV ANGIOGRAM CORONARY GRAFT N/A 6/22/2021    Procedure: Angiogram Coronary Graft;  Surgeon: Sury Sorenson MD;  Location:  HEART CARDIAC CATH LAB     CV HEART CATHETERIZATION WITH POSSIBLE INTERVENTION N/A 6/22/2021    Procedure: Heart Catheterization with Possible Intervention;   Surgeon: Sury Sorenson MD;  Location:  HEART CARDIAC CATH LAB     EXCISE CYST GENERIC (LOCATION) N/A 2016    Procedure: EXCISE CYST GENERIC (LOCATION);  Surgeon: Amador Vick MD;  Location:  [facility]     GRAFT VEIN FROM EXTREMITY (LOCATION)  1/10/2014    Procedure: GRAFT VEIN FROM EXTREMITY (LOCATION);;  Surgeon: Amador Vick MD;  Location:  OR     LAMINECTOMY THORACIC ONE LEVEL N/A 2017    Procedure: LAMINECTOMY THORACIC ONE LEVEL;  Surgeon: Marcelo Trent MD;  Location: [facility] OR     OPTICAL TRACKING SYSTEM FUSION POSTERIOR SPINE THORACIC THREE+ LEVELS N/A 2017    Procedure: OPTICAL TRACKING SYSTEM FUSION POSTERIOR SPINE THORACIC THREE+ LEVELS;  Surgeon: Marcelo Trent MD;  Location: [facility] OR     OTHER SURGICAL HISTORY  2021    CV HEART CATH      OTHER SURGICAL HISTORY  2021    CV ANGIOGRAM CORONARY GRAFT     OTHER SURGICAL HISTORY  10/11/2018    AMPUTATE REVISION STUMP LOWER EXTREMITY     OTHER SURGICAL HISTORY  10/08/2018    APPLY WOUND VAC     OTHER SURGICAL HISTORY  10/08/2018    AMPUTATE LEG BELOW KNEE     OTHER SURGICAL HISTORY  2017    OPTICAL TRACKING SYSTEM FUSION POSTERIOR SPINE THORACIC THREE + LEVELS     TONSILLECTOMY       VASCULAR SURGERY      ptcr legs       Family History   Problem Relation Age of Onset     Cancer Mother         leukemia     Leukemia Mother        Social History     Tobacco Use     Smoking status: Former Smoker     Packs/day: 2.00     Years: 30.00     Pack years: 60.00     Types: Cigarettes     Quit date: 1995     Years since quittin.7     Smokeless tobacco: Never Used   Substance Use Topics     Alcohol use: No     Drug use: No       No current outpatient medications on file.      Physical Exam     Triage Vitals:  ED Triage Vitals [21 1806]   Enc Vitals Group      BP (!) 188/87      Pulse 86      Resp 18      Temp 97.7  F (36.5  C)      Temp src Oral      SpO2 99 %      Weight 83.5 kg (184 lb)      Height        Head Circumference       Peak Flow       Pain Score       Pain Loc       Pain Edu?       Excl. in GC?         Vitals:  BP (!) 159/71 (BP Location: Right arm)   Pulse 75   Temp 97.7  F (36.5  C) (Oral)   Resp 20   Ht 1.829 m (6')   Wt 86.3 kg (190 lb 3.2 oz)   SpO2 93%   BMI 25.80 kg/m      PHYSICAL EXAM:   Physical Exam  Vitals and nursing note reviewed.   Constitutional:       General: He is not in acute distress.     Appearance: Normal appearance.   HENT:      Head: Normocephalic and atraumatic.      Nose: Nose normal.      Mouth/Throat:      Mouth: Mucous membranes are moist.   Eyes:      Pupils: Pupils are equal, round, and reactive to light.   Neck:      Vascular: No JVD.   Cardiovascular:      Rate and Rhythm: Normal rate and regular rhythm.      Pulses:           Radial pulses are 2+ on the right side and 2+ on the left side.   Pulmonary:      Effort: Pulmonary effort is normal. No respiratory distress.      Comments: Diminished lung sounds at bases bilaterally.   Abdominal:      Palpations: Abdomen is soft.      Tenderness: There is no abdominal tenderness.   Musculoskeletal:      Cervical back: Full passive range of motion without pain and neck supple.      Comments: No left calf tenderness or swelling. Right BKA.    Skin:     General: Skin is warm.      Findings: No rash.   Neurological:      General: No focal deficit present.      Mental Status: He is alert. Mental status is at baseline.      Comments: Fluent speech, no acute lateralizing deficits   Psychiatric:         Mood and Affect: Mood normal.         Behavior: Behavior normal.       Results     LAB:  All pertinent labs reviewed and interpreted  Labs Ordered and Resulted from Time of ED Arrival Up to the Time of Departure from the ED   COMPREHENSIVE METABOLIC PANEL - Abnormal; Notable for the following components:       Result Value    Chloride 108 (*)     Creatinine 1.39 (*)     Glucose 61 (*)     Albumin 3.3 (*)     GFR Estimate 48 (*)      All other components within normal limits   B-TYPE NATRIURETIC PEPTIDE ([facility] ONLY) - Abnormal; Notable for the following components:    BNP 2,340 (*)     All other components within normal limits   CBC WITH PLATELETS AND DIFFERENTIAL - Abnormal; Notable for the following components:    RBC Count 3.29 (*)     Hemoglobin 9.9 (*)     Hematocrit 31.4 (*)     RDW 17.3 (*)     Platelet Count 145 (*)     Absolute Lymphocytes 0.6 (*)     All other components within normal limits   ROUTINE UA WITH MICROSCOPIC REFLEX TO CULTURE - Abnormal; Notable for the following components:    Blood Urine 0.03 mg/dL (*)     Protein Albumin Urine 200  (*)     Bacteria Urine Few (*)     Mucus Urine Present (*)     All other components within normal limits    Narrative:     Urine Culture not indicated   TROPONIN I - Normal   DIGOXIN LEVEL - Normal   MAGNESIUM - Normal   PERIPHERAL IV CATHETER   INTAKE AND OUTPUT   CALL   CBC WITH PLATELETS & DIFFERENTIAL    Narrative:     The following orders were created for panel order CBC with platelets differential.  Procedure                               Abnormality         Status                     ---------                               -----------         ------                     CBC with platelets and d...[[id number]]  Abnormal            Final result                 Please view results for these tests on the individual orders.       RADIOLOGY:  XR Chest Port 1 View   Final Result   IMPRESSION: Cardiomegaly. Pulmonary vascularity normal. Mildly prominent interstitial pattern both lungs may represent edema. No airspace infiltrates or pleural effusions. Prior median sternotomy and CABG. Thoracic spinal stabilization rods.                 ECG:  NSR, rate 84, 1st degree AV block, no acute ischemia, similar to EKG from 7/12/21    I have independently reviewed and interpreted the EKG(s) documented above     PROCEDURES:  Procedures:  None      FINAL IMPRESSION:    ICD-10-CM    1. Acute on chronic  congestive heart failure, unspecified heart failure type (H)  I50.9    2. Hypoxia  R09.02        0 minutes of critical care time      I, Cordell Barrientos, am serving as a scribe to document services personally performed by Dr. Norberto Katz, based on my observations and the provider's statements to me. I, Norberto Katz, DO attest that Cordell Barrientos is acting in a scribe capacity, has observed my performance of the services and has documented them in accordance with my direction.      Norberto Katz, DO  Emergency Medicine  [facility] EMERGENCY ROOM  9/22/2021  6:47 PM        Norberto Katz MD  09/23/21 0315

## 2021-09-23 LAB
ALBUMIN SERPL-MCNC: 3 G/DL (ref 3.5–5)
ALP SERPL-CCNC: 84 U/L (ref 45–120)
ALT SERPL W P-5'-P-CCNC: 12 U/L (ref 0–45)
ANION GAP SERPL CALCULATED.3IONS-SCNC: 9 MMOL/L (ref 5–18)
AST SERPL W P-5'-P-CCNC: 12 U/L (ref 0–40)
BASOPHILS # BLD AUTO: 0 10E3/UL (ref 0–0.2)
BASOPHILS NFR BLD AUTO: 0 %
BILIRUB SERPL-MCNC: 0.8 MG/DL (ref 0–1)
BUN SERPL-MCNC: 18 MG/DL (ref 8–28)
CALCIUM SERPL-MCNC: 8.9 MG/DL (ref 8.5–10.5)
CHLORIDE BLD-SCNC: 105 MMOL/L (ref 98–107)
CO2 SERPL-SCNC: 24 MMOL/L (ref 22–31)
CREAT SERPL-MCNC: 1.42 MG/DL (ref 0.7–1.3)
EOSINOPHIL # BLD AUTO: 0 10E3/UL (ref 0–0.7)
EOSINOPHIL NFR BLD AUTO: 1 %
ERYTHROCYTE [DISTWIDTH] IN BLOOD BY AUTOMATED COUNT: 17.1 % (ref 10–15)
GFR SERPL CREATININE-BSD FRML MDRD: 47 ML/MIN/1.73M2
GLUCOSE BLD-MCNC: 184 MG/DL (ref 70–125)
GLUCOSE BLDC GLUCOMTR-MCNC: 156 MG/DL (ref 70–99)
GLUCOSE BLDC GLUCOMTR-MCNC: 192 MG/DL (ref 70–99)
GLUCOSE BLDC GLUCOMTR-MCNC: 237 MG/DL (ref 70–99)
GLUCOSE BLDC GLUCOMTR-MCNC: 263 MG/DL (ref 70–99)
HBA1C MFR BLD: 7.6 %
HCT VFR BLD AUTO: 29.3 % (ref 40–53)
HGB BLD-MCNC: 9.3 G/DL (ref 13.3–17.7)
IMM GRANULOCYTES # BLD: 0 10E3/UL
IMM GRANULOCYTES NFR BLD: 0 %
LACTATE SERPL-SCNC: 1.9 MMOL/L (ref 0.7–2)
LYMPHOCYTES # BLD AUTO: 0.5 10E3/UL (ref 0.8–5.3)
LYMPHOCYTES NFR BLD AUTO: 18 %
MAGNESIUM SERPL-MCNC: 1.6 MG/DL (ref 1.8–2.6)
MCH RBC QN AUTO: 29.9 PG (ref 26.5–33)
MCHC RBC AUTO-ENTMCNC: 31.7 G/DL (ref 31.5–36.5)
MCV RBC AUTO: 94 FL (ref 78–100)
MONOCYTES # BLD AUTO: 0.5 10E3/UL (ref 0–1.3)
MONOCYTES NFR BLD AUTO: 16 %
NEUTROPHILS # BLD AUTO: 2 10E3/UL (ref 1.6–8.3)
NEUTROPHILS NFR BLD AUTO: 65 %
NRBC # BLD AUTO: 0 10E3/UL
NRBC BLD AUTO-RTO: 0 /100
PLATELET # BLD AUTO: 102 10E3/UL (ref 150–450)
POTASSIUM BLD-SCNC: 3.7 MMOL/L (ref 3.5–5)
PROT SERPL-MCNC: 5.6 G/DL (ref 6–8)
RBC # BLD AUTO: 3.11 10E6/UL (ref 4.4–5.9)
SODIUM SERPL-SCNC: 138 MMOL/L (ref 136–145)
WBC # BLD AUTO: 3 10E3/UL (ref 4–11)

## 2021-09-23 PROCEDURE — 99222 1ST HOSP IP/OBS MODERATE 55: CPT | Performed by: INTERNAL MEDICINE

## 2021-09-23 PROCEDURE — 82040 ASSAY OF SERUM ALBUMIN: CPT | Performed by: INTERNAL MEDICINE

## 2021-09-23 PROCEDURE — 250N000011 HC RX IP 250 OP 636: Performed by: INTERNAL MEDICINE

## 2021-09-23 PROCEDURE — C9399 UNCLASSIFIED DRUGS OR BIOLOG: HCPCS | Performed by: FAMILY MEDICINE

## 2021-09-23 PROCEDURE — 83605 ASSAY OF LACTIC ACID: CPT | Performed by: FAMILY MEDICINE

## 2021-09-23 PROCEDURE — 250N000012 HC RX MED GY IP 250 OP 636 PS 637: Performed by: INTERNAL MEDICINE

## 2021-09-23 PROCEDURE — 36415 COLL VENOUS BLD VENIPUNCTURE: CPT | Performed by: INTERNAL MEDICINE

## 2021-09-23 PROCEDURE — 250N000011 HC RX IP 250 OP 636: Performed by: FAMILY MEDICINE

## 2021-09-23 PROCEDURE — 85025 COMPLETE CBC W/AUTO DIFF WBC: CPT | Performed by: INTERNAL MEDICINE

## 2021-09-23 PROCEDURE — 250N000013 HC RX MED GY IP 250 OP 250 PS 637: Performed by: INTERNAL MEDICINE

## 2021-09-23 PROCEDURE — 99232 SBSQ HOSP IP/OBS MODERATE 35: CPT | Performed by: FAMILY MEDICINE

## 2021-09-23 PROCEDURE — 250N000013 HC RX MED GY IP 250 OP 250 PS 637: Performed by: FAMILY MEDICINE

## 2021-09-23 PROCEDURE — 83735 ASSAY OF MAGNESIUM: CPT | Performed by: INTERNAL MEDICINE

## 2021-09-23 PROCEDURE — 250N000012 HC RX MED GY IP 250 OP 636 PS 637: Performed by: FAMILY MEDICINE

## 2021-09-23 PROCEDURE — 210N000002 HC R&B HEART CARE

## 2021-09-23 PROCEDURE — 36415 COLL VENOUS BLD VENIPUNCTURE: CPT | Performed by: FAMILY MEDICINE

## 2021-09-23 PROCEDURE — 83036 HEMOGLOBIN GLYCOSYLATED A1C: CPT | Performed by: FAMILY MEDICINE

## 2021-09-23 RX ORDER — NICOTINE POLACRILEX 4 MG
15-30 LOZENGE BUCCAL
Status: DISCONTINUED | OUTPATIENT
Start: 2021-09-23 | End: 2021-09-25 | Stop reason: HOSPADM

## 2021-09-23 RX ORDER — FUROSEMIDE 10 MG/ML
20 INJECTION INTRAMUSCULAR; INTRAVENOUS EVERY 12 HOURS
Status: DISCONTINUED | OUTPATIENT
Start: 2021-09-23 | End: 2021-09-24

## 2021-09-23 RX ORDER — METOPROLOL SUCCINATE 100 MG/1
100 TABLET, EXTENDED RELEASE ORAL DAILY
Status: DISCONTINUED | OUTPATIENT
Start: 2021-09-23 | End: 2021-09-25 | Stop reason: HOSPADM

## 2021-09-23 RX ORDER — SPIRONOLACTONE 25 MG
12.5 TABLET ORAL DAILY
Status: DISCONTINUED | OUTPATIENT
Start: 2021-09-23 | End: 2021-09-25 | Stop reason: HOSPADM

## 2021-09-23 RX ORDER — MAGNESIUM SULFATE HEPTAHYDRATE 40 MG/ML
2 INJECTION, SOLUTION INTRAVENOUS ONCE
Status: COMPLETED | OUTPATIENT
Start: 2021-09-23 | End: 2021-09-23

## 2021-09-23 RX ORDER — DEXTROSE MONOHYDRATE 25 G/50ML
25-50 INJECTION, SOLUTION INTRAVENOUS
Status: DISCONTINUED | OUTPATIENT
Start: 2021-09-23 | End: 2021-09-25 | Stop reason: HOSPADM

## 2021-09-23 RX ADMIN — PANTOPRAZOLE SODIUM 40 MG: 20 TABLET, DELAYED RELEASE ORAL at 08:31

## 2021-09-23 RX ADMIN — MAGNESIUM SULFATE HEPTAHYDRATE 2 G: 40 INJECTION, SOLUTION INTRAVENOUS at 10:54

## 2021-09-23 RX ADMIN — APIXABAN 5 MG: 5 TABLET, FILM COATED ORAL at 08:30

## 2021-09-23 RX ADMIN — Medication 1000 UNITS: at 08:49

## 2021-09-23 RX ADMIN — ASPIRIN 81 MG: 81 TABLET, CHEWABLE ORAL at 08:30

## 2021-09-23 RX ADMIN — APIXABAN 5 MG: 5 TABLET, FILM COATED ORAL at 21:35

## 2021-09-23 RX ADMIN — VENETOCLAX 200 MG: 100 TABLET, FILM COATED ORAL at 16:27

## 2021-09-23 RX ADMIN — TRAZODONE HYDROCHLORIDE 50 MG: 50 TABLET ORAL at 21:35

## 2021-09-23 RX ADMIN — ATORVASTATIN CALCIUM 20 MG: 10 TABLET, FILM COATED ORAL at 08:30

## 2021-09-23 RX ADMIN — SACUBITRIL AND VALSARTAN 1 TABLET: 49; 51 TABLET, FILM COATED ORAL at 08:28

## 2021-09-23 RX ADMIN — SACUBITRIL AND VALSARTAN 1 TABLET: 49; 51 TABLET, FILM COATED ORAL at 21:35

## 2021-09-23 RX ADMIN — FUROSEMIDE 20 MG: 10 INJECTION, SOLUTION INTRAMUSCULAR; INTRAVENOUS at 16:27

## 2021-09-23 RX ADMIN — INSULIN ASPART 3 UNITS: 100 INJECTION, SOLUTION INTRAVENOUS; SUBCUTANEOUS at 16:54

## 2021-09-23 RX ADMIN — FUROSEMIDE 10 MG: 20 TABLET ORAL at 08:28

## 2021-09-23 RX ADMIN — DIGOXIN 125 MCG: 0.12 TABLET ORAL at 08:31

## 2021-09-23 RX ADMIN — CITALOPRAM HYDROBROMIDE 20 MG: 20 TABLET ORAL at 06:06

## 2021-09-23 RX ADMIN — INSULIN GLARGINE 30 UNITS: 100 INJECTION, SOLUTION SUBCUTANEOUS at 21:36

## 2021-09-23 RX ADMIN — SPIRONOLACTONE 12.5 MG: 25 TABLET ORAL at 13:21

## 2021-09-23 RX ADMIN — INSULIN ASPART 14 UNITS: 100 INJECTION, SOLUTION INTRAVENOUS; SUBCUTANEOUS at 16:54

## 2021-09-23 RX ADMIN — METOPROLOL SUCCINATE 100 MG: 100 TABLET, EXTENDED RELEASE ORAL at 10:54

## 2021-09-23 ASSESSMENT — MIFFLIN-ST. JEOR: SCORE: 1625.28

## 2021-09-23 ASSESSMENT — ACTIVITIES OF DAILY LIVING (ADL): DEPENDENT_IADLS:: TRANSPORTATION;MEDICATION MANAGEMENT

## 2021-09-23 NOTE — UTILIZATION REVIEW
Admission Status; Secondary Review Determination       Under the authority of the Utilization Management Committee, the utilization review process indicated a secondary review on the above patient. The review outcome is based on review of the medical records, discussions with staff, and applying clinical experience noted on the date of the review.     (x) Inpatient Status Appropriate - This patient's medical care is consistent with medical management for inpatient care and reasonable inpatient medical practice.     RATIONALE FOR DETERMINATION     Mr. Manning is a 81 yo male pt with a PMH of CM (EF 20-25%) who presents to the ED with c/o SOB. CXR with evidence of pulmonary edema.  He was given one dose of IV lasix on admission.  Cardiology has been consulted; he will be restarted on IV lasix bid today with close clinical monitoring of his volume status and oxygen needs.  He creat is more elevated today after IV diuresis and will need to be monitored closely  He continues to require supplemental oxygen.      At the time of admission with the information available to the attending physician more than 2 nights Hospital complex care was anticipated, based on patient risk of adverse outcome if treated as outpatient and complex care required. Inpatient admission is appropriate based on the Medicare guidelines.     This document was produced using voice recognition software       The information on this document is developed by the utilization review team in order for the business office to ensure compliance. This only denotes the appropriateness of proper admission status and does not reflect the quality of care rendered.   The definitions of Inpatient Status and Observation Status used in making the determination above are those provided in the CMS Coverage Manual, Chapter 1 and Chapter 6, section 70.4.         Sincerely,     Ela Valdivia, DO  Utilization Review  Physician Advisor  [facility].

## 2021-09-23 NOTE — CONSULTS
HEART CARE CONSULTATON NOTE            Reason for consult: 80-year-old male with a history of ischemic cardiomyopathy followed at the [facility] presents with increasing symptoms of shortness of breath.     Assessment:   1.  Heart failure with reduced ejection fraction.  History of coronary artery disease with reduced ejection fraction of 20 to 25%.  Records from [facility] have been reviewed.  He is followed closely in the heart failure clinic.  Recently he was transitioned to Entresto and the dose was increased.  There was discussion about spironolactone and empagliflozin.  Patient has diuresed approximately 1 L since admission.  Creat is at baseline at 1.42 and will need to be monitored.  Current cardiovascular medications include apixaban, aspirin, atorvastatin, digoxin furosemide 10 mg daily.  He was given 1 dose of IV furosemide last evening.  Metoprolol 100 mg daily, Protonix 40 mg daily.  Entresto 49/51.  BNP 2340.  Previous measurements proBNP.  Troponin x1.06.  He reports feeling approximate 20% better.  He is comfortable at rest although has not ambulated much.  I discussed the findings of volume overload.  We discussed continuing IV Lasix although with close monitoring of I's and O's.  Will change to 20 mg IV every 12 hours and observe response.  He is on a very low dose of oral diuretics.  I discussed adding spironolactone and will start at 12.5 mg daily.  Patient tells me he has a follow-up with his primary cardiologist next week.  He tells me that there have been some discussions about possible defibrillator.  We will try to correspond with his cardiologist .  Patient chronically on digoxin with a digoxin level of 0.8.    2.  Ischemic cardiomyopathy.  Reduced ejection fraction.  Patient tells me that he has a follow-up with his primary cardiologist and there was some discussion about possible defibrillator.    3.  Anemia per primary care.  Is noted that his hemoglobin  was 11.2 June 19, 2021 hemoglobin today 9.3.     Plan:   1.  Resume IV Lasix today at 20 mg IV every 12 hours and observe response.  Patient is on low-dose oral furosemide typically.  2.  Add spironolactone 12.5 mg daily.  Monitor potassium.  3.  PT.  Will defer to primary care.  4.  Monitor electrolytes closely.  5.  Consider Jardiance or Farxiga.  Will initiate spironolactone initially.   History:      HPI: 80-year-old male with a history of congestive heart failure, atrial fibrillation, coronary artery disease, status post right BKA, CLL on chemotherapy presented to the emergency room yesterday for increasing shortness of breath.  Shortness of breath symptoms again 4 days prior and was reported mild over the weekend but progressed over the past 2 days.  He reported that he was having difficulty sleeping at night.  He states he was feeling anxious with increased shortness of breath.  The ER notes revealed mild lower extremity edema and recent weight gain.  He also noted that he has been drinking more fluids but urinating less frequently.  He felt as if he might be dehydrated.  He tells me that he tried to walk to a podiatrist appointment in his building but was more breathless.  He has a little bit of difficulty describing the recent history.  Denies chest pain, syncope or near syncope.  He tells me he has an upcoming appointment with his primary cardiologist and also tells me that there have been discussions about possible defibrillator.    He follows closely at [facility] core clinic.  There is a note from his primary cardiologist Dr. Valdes July 12, 2021 with a recent admission to [facility] non-STEMI and underwent coronary angiogram that showed pression of his known coronary artery disease.  Left main disease was worse, new stenosis of the saphenous vein graft ported 70%.  He was transitioned to Entresto.  There was 1 day where he missed medication based on the note from July 2021.   Echocardiogram from June 2021 reports an EF of 20 to 25%.  Heart cath from June 2021 as outlined below.  Moderate stenosis of the left main, severe stenosis of the left anterior descending with mid LAD occlusion.  Distal LAD filling via patent LIMA.  Circumflex is occluded, diffuse disease in the RCA.  LIMA to the LAD was patent, vein graft to the OM is patent, vein graft to a diagonal was patent with a 70% stenosis.  It was reported to supply small diagonal branches.  There was discussion about initiating spironolactone and empagliflozin during that visit.  It appears that Lasix was discontinued when he was seen July 30, 2021.  He was felt to be hypovolemic on that visit.  The most recent visit is August 26, 2021 that time he was able to walk up and down the hallway into the dining room.  He was denying orthopnea or PND at that time..  He was resumed on 20 mg Lasix.  Entresto was increased at that time.    Past Medical History:   Diagnosis Date     ACS (acute coronary syndrome) (H) 6/20/2021     Acute respiratory failure with hypoxia (H) 4/6/2017     STORM (acute kidney injury) (H) 9/22/2018     Arthritis      ASCVD (arteriosclerotic cardiovascular disease)      Atrial fibrillation (H) 2/5/2018     Atrial flutter (H) 2/22/2017     BMI 30.0-30.9,adult      BPH (benign prostatic hypertrophy)      CAD S/P percutaneous coronary angioplasty 1/11/2014     Cellulitis      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)      Chronic systolic heart failure (H) 6/6/2017     Confusion 6/20/2021     Coronary artery disease     triple bypass 1995     CRF (chronic renal failure), stage 3 (moderate) 1/20/2017     Critical lower limb ischemia 1/10/2014     Diabetes mellitus (H)      Diabetes mellitus, type 2 (H) 1/11/2014     Diabetic polyneuropathy (H)      Discitis 2/8/2017     Epidural abscess 2/9/2017     Fall 2/7/2017     History of blood transfusion      HTN (hypertension) 1/11/2014      Hyperglycemia 1/9/2020     Hyperlipidaemia      Hyperlipidemia with target LDL less than 100 1/11/2014    Formatting of this note might be different from the original. Overview:  Diagnosis updated by automated process. Provider to review and confirm. Formatting of this note might be different from the original. Diagnosis updated by automated process. Provider to review and confirm.     Hypertension      Long term current use of anticoagulant therapy 2/5/2018     Non-healing ulcer of foot (H)     Right     Noninflammatory pericardial effusion      Osteomyelitis (H) 1/11/2014     Osteomyelitis of foot, right, acute (H) 2/15/2017     Osteomyelitis of left foot (H)      PVD (peripheral vascular disease) (H)      Recurrent falls 6/20/2021     Sebaceous cyst       Past Surgical History:   Procedure Laterality Date     AMPUTATE LEG BELOW KNEE Right 10/8/2018    Procedure: AMPUTATE LEG BELOW KNEE;  OPEN RIGHT LOWER LEG AMPUTATION WITH WOUND VAC PLACEMENT    EBL: 50mL;  Surgeon: Amador Vick MD;  Location:  OR     AMPUTATE REVISION STUMP LOWER EXTREMITY Right 10/11/2018    Procedure: AMPUTATE REVISION STUMP LOWER EXTREMITY;  CLOSURE RIGHT BELOW KNEE AMPUTATION;  Surgeon: Amador Vick MD;  Location:  OR     AMPUTATE TOE(S)  1/10/2014    Procedure: AMPUTATE TOE(S);;  Surgeon: Amador Vick MD;  Location:  OR     APPLY WOUND VAC Right 10/8/2018    Procedure: APPLY WOUND VAC;;  Surgeon: Amador Vick MD;  Location:  OR     BONE MARROW BIOPSY, BONE SPECIMEN, NEEDLE/TROCAR  10/19/2012    Procedure: BIOPSY BONE MARROW;  BONE MARROW BIOPSY  (CONSCIOUS SED);  Surgeon: Ramon Quesada MD;  Location:  GI     BYPASS GRAFT FEMOROPOPLITEAL  1/10/2014    Procedure: BYPASS GRAFT FEMOROPOPLITEAL;  Left above knee popliteal to  Below knee popliteal bypass using transverse saphenous vein graft. Left 2nd Toe amputation;  Surgeon: Amador Vick MD;  Location:  OR     CARDIAC SURGERY       angioplasty with stent, triple bypass     CV ANGIOGRAM CORONARY GRAFT N/A 6/22/2021    Procedure: Angiogram Coronary Graft;  Surgeon: Sury Sorenson MD;  Location:  HEART CARDIAC CATH LAB     CV HEART CATHETERIZATION WITH POSSIBLE INTERVENTION N/A 6/22/2021    Procedure: Heart Catheterization with Possible Intervention;  Surgeon: Sury Sorenson MD;  Location:  HEART CARDIAC CATH LAB     EXCISE CYST GENERIC (LOCATION) N/A 2/1/2016    Procedure: EXCISE CYST GENERIC (LOCATION);  Surgeon: Amador Vick MD;  Location:  SD     GRAFT VEIN FROM EXTREMITY (LOCATION)  1/10/2014    Procedure: GRAFT VEIN FROM EXTREMITY (LOCATION);;  Surgeon: Amador Vick MD;  Location:  OR     LAMINECTOMY THORACIC ONE LEVEL N/A 2/8/2017    Procedure: LAMINECTOMY THORACIC ONE LEVEL;  Surgeon: Marcelo Trent MD;  Location: [facility] OR     OPTICAL TRACKING SYSTEM FUSION POSTERIOR SPINE THORACIC THREE+ LEVELS N/A 2/12/2017    Procedure: OPTICAL TRACKING SYSTEM FUSION POSTERIOR SPINE THORACIC THREE+ LEVELS;  Surgeon: Marcelo Trent MD;  Location: [facility] OR     OTHER SURGICAL HISTORY  06/22/2021    CV HEART CATH      OTHER SURGICAL HISTORY  06/22/2021    CV ANGIOGRAM CORONARY GRAFT     OTHER SURGICAL HISTORY  10/11/2018    AMPUTATE REVISION STUMP LOWER EXTREMITY     OTHER SURGICAL HISTORY  10/08/2018    APPLY WOUND VAC     OTHER SURGICAL HISTORY  10/08/2018    AMPUTATE LEG BELOW KNEE     OTHER SURGICAL HISTORY  02/12/2017    OPTICAL TRACKING SYSTEM FUSION POSTERIOR SPINE THORACIC THREE + LEVELS     TONSILLECTOMY       VASCULAR SURGERY      ptcr legs      Social History     Socioeconomic History     Marital status: Single     Spouse name: Not on file     Number of children: Not on file     Years of education: Not on file     Highest education level: Not on file   Occupational History     Not on file   Tobacco Use     Smoking status: Former Smoker     Packs/day: 2.00     Years: 30.00     Pack years: 60.00     Types:  Cigarettes     Quit date: 1995     Years since quittin.7     Smokeless tobacco: Never Used   Substance and Sexual Activity     Alcohol use: No     Drug use: No     Sexual activity: Not on file   Other Topics Concern     Parent/sibling w/ CABG, MI or angioplasty before 65F 55M? Not Asked   Social History Narrative    Lives independently with SO. 2017      Social Determinants of Health     Financial Resource Strain:      Difficulty of Paying Living Expenses:    Food Insecurity:      Worried About Running Out of Food in the Last Year:      Ran Out of Food in the Last Year:    Transportation Needs:      Lack of Transportation (Medical):      Lack of Transportation (Non-Medical):    Physical Activity:      Days of Exercise per Week:      Minutes of Exercise per Session:    Stress:      Feeling of Stress :    Social Connections:      Frequency of Communication with Friends and Family:      Frequency of Social Gatherings with Friends and Family:      Attends Oriental orthodox Services:      Active Member of Clubs or Organizations:      Attends Club or Organization Meetings:      Marital Status:    Intimate Partner Violence:      Fear of Current or Ex-Partner:      Emotionally Abused:      Physically Abused:      Sexually Abused:      Family History   Problem Relation Age of Onset     Cancer Mother         leukemia     Leukemia Mother      Allergies   Allergen Reactions     Blood Transfusion Related (Informational Only) Other (See Comments)     Patient has a history of a clinically significant antibody against RBC antigens.  A delay in compatible RBCs may occur.      Blood-Group Specific Substance Other (See Comments)     Patient has a suggestive Warm Auto-antibody. Blood products may be delayed. Draw patient 24 hours prior to transfusion. Draw one red top and two purple top tubes for all type and screen orders.     No current outpatient medications on file.         Review of Systems  All pertinent positives and  negatives reviewed in History.  All other 10 point review of systems reviewed and negative.      Objective:    Physical Exam  VITALS: BP (!) 156/74 (BP Location: Right arm)   Pulse 79   Temp 98.4  F (36.9  C) (Oral)   Resp 20   Ht 1.829 m (6')   Wt 86.3 kg (190 lb 3.2 oz)   SpO2 91%   BMI 25.80 kg/m    BMI: Body mass index is 25.8 kg/m .  Wt Readings from Last 3 Encounters:   09/22/21 86.3 kg (190 lb 3.2 oz)   08/26/21 82.7 kg (182 lb 4.8 oz)   07/30/21 79.8 kg (176 lb)       Intake/Output Summary (Last 24 hours) at 9/23/2021 0638  Last data filed at 9/23/2021 0529  Gross per 24 hour   Intake 570 ml   Output 1300 ml   Net -730 ml     General Appearance:  Alert, cooperative, no distress, appears stated age   HEENT:  Normocephalic, without obvious abnormality   Neck: Supple, symmetrical, trachea midline, no adenopathy, thyroid: not enlarged, symmetric, no carotid bruit the venous pressure is mildly elevated approximately 10 cm of water.   Back:   Symmetric, no curvature, ROM normal, no CVA tenderness   Lungs:    Decreased breath sounds at the bases.  No significant rales detected., respirations unlabored   Chest Wall:  No tenderness or deformity   Heart:  Regular rate and rhythm, S1, S2 normal,no murmur, rub or gallop   Abdomen:   Soft, non-tender, bowel sounds active all four quadrants,  no masses, no organomegaly, mild tenderness to abdominal palpation.   Extremities:  Mild edema of the left lower extremity, BKA of the right lower extremity without significant edema or skin breakdown.   Skin: Skin color, texture, turgor normal, no rashes or lesions   Neurologic: Alert and oriented X 3, Moves all 4 extremities     Cardiographics  ECG: Sinus rhythm, LVH, T wave little flatter in the lateral leads when compared to the prior EKG.  Echocardiogram:  TTE 6/20/21  1. Left ventricular systolic function is severely reduced. The visual ejection  fraction is estimated at 20-25%.  2. The right ventricle is normal in  structure, function and size.  3. The left atrium is moderately dilated.  4. The aortic valve is trileaflet with aortic valve sclerosis.  5. There is mild (1+) mitral regurgitation.  ______________________________________________________________________________        TTE 10/5/18  The visual ejection fraction is estimated at 35-40%.  There is mild-moderate global hypokinesia of the left ventricle.  Mildly decreased right ventricular systolic function  In comparison with the previous study the LVEF has improved slightly.     [facility] 3/2018       [facility] 6/19/21    1.  Severe multivessel CAD s/p CABG.                LM has severe stenosis.                LAD has sever proximal stenosis and midLAD is occluded. Distal LAD fills via patent LIMA.                LCx is occluded.                 RCA has moderate diffuse disease.  2.  LIMA to LAD is patent.  Mid to distal LAD stents are patent.  3.  SVG to OM is patent.  4.  SVG to Diagonal is patent with a 70% stenosis and supplies diagonal branches are small and diffusely diseased  Imaging  Chest x-ray:   Order  XR Chest Port 1 View [[id number]] (Order [id number])    Exam Information    Exam Date Exam Time Exam Date Exam Time Accession # Performing Department Results    9/22/21  6:21 PM 9/22/21  7:38 PM [id number] [facility] Diagnostic Imaging      PACS Images     Show images for XR Chest Port 1 View       Study Result    Narrative & Impression   EXAM: XR CHEST PORT 1 VIEW  LOCATION: [facility]  DATE/TIME: 9/22/2021 6:21 PM     INDICATION: sob, chf  COMPARISON: 06/20/2021                                                                       IMPRESSION: Cardiomegaly. Pulmonary vascularity normal. Mildly prominent interstitial pattern both lungs may represent edema. No airspace infiltrates or pleural effusions. Prior median sternotomy and CABG. Thoracic spinal stabilization rods            Lab Results    Chemistry/lipid  CBC Cardiac Enzymes/BNP/TSH/INR   Recent Labs   Lab Test 06/21/21  0522 04/09/18  0951 05/23/14  0745   CHOL 116   < > 134   HDL 38*   < > 34*   LDL 49   < > 48   TRIG 145   < > 257*   CHOLHDLRATIO  --   --  3.9    < > = values in this interval not displayed.     Recent Labs   Lab Test 06/21/21  0522 05/18/20  1117 08/26/19  1055   LDL 49 60 85     Recent Labs   Lab Test 09/23/21  0505      POTASSIUM 3.7   CHLORIDE 105   CO2 24   *   BUN 18   CR 1.42*   GFRESTIMATED 47*   JUSTINO 8.9     Recent Labs   Lab Test 09/23/21  0505 09/22/21 1957 08/26/21  1002   CR 1.42* 1.39* 1.44*     Recent Labs   Lab Test 06/20/21  0707 01/09/20  2313 09/22/18  0710   A1C 9.0* 14.1* 9.0*          Recent Labs   Lab Test 09/23/21  0505   WBC 3.0*   HGB 9.3*   HCT 29.3*   MCV 94   *     Recent Labs   Lab Test 09/23/21  0505 09/22/21 1957 06/26/21  0618   HGB 9.3* 9.9* 9.6*    Recent Labs   Lab Test 09/22/21 1957   TROPONINI 0.06     Recent Labs   Lab Test 09/22/21 1957 06/20/21  0707 10/08/18  0812 10/04/18  0839 10/03/18  0905 02/09/18  0934 05/25/17  1124 05/11/17  1238   BNP 2,340*  --   --   --   --   --   --   --    NTBNPI  --  7,364* 5,223* 21,539*   < >  --   --   --    NTBNP  --   --   --   --   --  22,172* 13,210* 16,812*    < > = values in this interval not displayed.     Recent Labs   Lab Test 10/03/18  0905   TSH 1.11     Recent Labs   Lab Test 04/16/21  1610 02/16/21  1014 01/08/21  1250   INR 1.05 3.6* 3.8*

## 2021-09-23 NOTE — PLAN OF CARE
Pt has been eating, drinking, voiding and has active bowel signs.    Problem: Adult Inpatient Plan of Care  Goal: Plan of Care Review  Outcome: Improving     Problem: Risk for Delirium  Goal: Optimal Coping  Outcome: Improving   Pt understands why he is in the hospital and is coping with it well.  No signs of delirium at this time. A/Ox4.    Problem: Adult Inpatient Plan of Care  Goal: Absence of Hospital-Acquired Illness or Injury  Intervention: Identify and Manage Fall Risk  Recent Flowsheet Documentation  Taken 9/23/2021 1245 by Kate Mckinley RN  Safety Promotion/Fall Prevention: assistive device/personal items within reach  Taken 9/23/2021 0815 by Kate Mckinley RN  Safety Promotion/Fall Prevention: assistive device/personal items within reach  Intervention: Prevent Skin Injury  Recent Flowsheet Documentation  Taken 9/23/2021 1245 by Kate Mckinley RN  Body Position: position changed independently  Taken 9/23/2021 0815 by Kate Mckinley RN  Body Position: position changed independently   Pt has been reminded to change position every 2 hours and repositioned.  Tolerating it well.    Continue to monitor VS, labs, pain level, skin integrity, respiratory status and activity tolerance.

## 2021-09-23 NOTE — PHARMACY-ADMISSION MEDICATION HISTORY
Pharmacy Note - Admission Medication History    Pertinent Provider Information: med list from [facility].  Staff states patient had AM, noon medications today, but did not have evening/HS meds.      ______________________________________________________________________    Prior To Admission (PTA) med list completed and updated in EMR.       PTA Med List   Medication Sig Last Dose     acetaminophen (TYLENOL) 325 MG tablet Take 650 mg by mouth every 4 hours as needed for mild pain or fever prn     allopurinol (ZYLOPRIM) 300 MG tablet Take 300 mg by mouth daily  9/22/2021 at Unknown time     apixaban ANTICOAGULANT (ELIQUIS ANTICOAGULANT) 5 MG tablet Take 1 tablet (5 mg) by mouth 2 times daily 9/22/2021 at am     aspirin (ASA) 81 MG chewable tablet Take 81 mg by mouth daily  9/22/2021 at Unknown time     atorvastatin (LIPITOR) 20 MG tablet Take 1 tablet (20 mg) by mouth daily 9/22/2021 at Unknown time     Mahsa Protect (EUCERIN) external cream Apply topically 2 times daily as needed for dry skin or other Apply as needed to intact stage 1 pressure area and/or irritated skin, rash or excoriation PRN     bisacodyl (DULCOLAX) 10 MG suppository Place 10 mg rectally daily as needed for constipation If no BM or small BM for 3 days/9 shifts PRN     cholecalciferol 1000 UNITS TABS Take 1,000 Units by mouth daily 9/22/2021 at Unknown time     citalopram (CELEXA) 20 MG tablet Take 20 mg by mouth every morning  9/22/2021 at Unknown time     digoxin (LANOXIN) 125 MCG tablet Take 1 tablet (125 mcg) by mouth daily 9/22/2021 at am     ENTRESTO 49-51 MG per tablet TAKE 1.5 TABLETS BY MOUTH IN THE MORNING AND 1.5 TABLETS IN THE EVENING. 9/22/2021 at am     furosemide (LASIX) 20 MG tablet Take 0.5 tablets (10 mg) by mouth daily 9/22/2021 at Unknown time     guaiFENesin (ROBITUSSIN) 100 MG/5ML SYRP Take 10 mLs by mouth every 4 hours as needed for cough For cold symptoms and non productive cough PRN     insulin glargine (LANTUS  PEN) 100 UNIT/ML pen Inject 30 Units Subcutaneous At Bedtime 9/21/2021 at Unknown time     insulin lispro (HUMALOG KWIKPEN) 100 UNIT/ML (1 unit dial) KWIKPEN Inject 12 Units Subcutaneous 2 times daily (before meals) Breakfast and lunch 9/22/2021 at Unknown time     insulin lispro (HUMALOG KWIKPEN) 100 UNIT/ML (1 unit dial) KWIKPEN Inject 14 Units Subcutaneous daily (with dinner) 9/21/2021 at Unknown time     loperamide (IMODIUM A-D) 2 MG tablet Take 2-4 mg by mouth 4 times daily as needed for diarrhea Take 4 mg after 1st loose stool, then take 1 tablet as needed after each loose stool PRN     magnesium hydroxide (MILK OF MAGNESIA) 400 MG/5ML suspension Take 30 mLs by mouth daily as needed for constipation or heartburn If no BM/small BM for 3 days/9 shifts PRN     metFORMIN (GLUCOPHAGE-XR) 750 MG 24 hr tablet Take 750 mg by mouth every morning 9/22/2021 at Unknown time     metoprolol succinate ER (TOPROL-XL) 100 MG 24 hr tablet Take 1 tablet (100 mg) by mouth daily 9/22/2021 at am     neomycin-bacitracin-polymyxin (NEOSPORIN) 5-400-5000 ointment Apply 1 each topically 3 times daily as needed (for minor abrasions) PRN     nitroglycerin (NITROSTAT) 0.4 MG sublingual tablet For chest pain place 1 tablet under the tongue every 5 minutes for 3 doses. If symptoms persist 5 minutes after 1st dose call 911. PRN     nystatin (MYCOSTATIN) 479208 UNIT/GM external powder Apply topically 2 times daily as needed (to affected area(s) as needed) PRN     pantoprazole (PROTONIX) 40 MG EC tablet Take 40 mg by mouth daily 9/22/2021 at Unknown time     traZODone (DESYREL) 50 MG tablet Take 50 mg by mouth At Bedtime 9/21/2021 at Unknown time     venetoclax (VENCLEXTA) 100 MG tablet Take 200 mg by mouth daily 9/22/2021 at Unknown time       Information source(s): Facility ([facility]/) medication list/MAR  Method of interview communication: in-person    Summary of Changes to PTA Med List  New: PRNs, humalog, metformin   Discontinued:  hydrocortisone, magnesium, melatonin, miralax  Changed: trazodone to scheduled    Patient was asked about OTC/herbal products specifically.  PTA med list reflects this.    In the past week, patient estimated taking medication this percent of the time:  greater than 90%.    Allergies were reviewed, assessed, and updated with the patient.      Patient did not bring any medications to the hospital and can't retrieve from home. No multi-dose medications are available for use during hospital stay.     The information provided in this note is only as accurate as the sources available at the time of the update(s).    Thank you for the opportunity to participate in the care of this patient.    Pinky Davidson [facility]  9/22/2021 8:51 PM

## 2021-09-23 NOTE — H&P
[facility] MEDICINE ADMISSION HISTORY AND PHYSICAL       Assessment & Plan      1. Acute on chronic CHF exacerbation with most recent EF of 20-25%. Last [facility] was June 2021. He is requiring O2 supplement 2-3L, sats 94%.     - Unclear what triggered exacerbation - however, will check for infection, trops, and dietary compliance. His fluid intake seems not well controlled, he reported drinks a lot of fluid, I did suggest fluid restrictions.   - Ttirate diuretics as needed, if not sufficient, use CPAP  - Consider cardiology eval -  eval or AICD with low EF/ CHF cares   - AM labs  - HOLDing metoprolol due to acute CHF -- order in AM if safe    2. He has history of CAD s/p CABG, PAD s/p iliac stenting and LE bypass - resume meds     3. On eliquis for Atrial flutter    4. Has DM2 with CKD stage III - had low glucose of 61 -- hold insulin tonight, resume meds in AM except metformin given risk acidosis while IP     5. On venetoclax for CLL       VTE prophylaxis: on eliquis  IV fluids: Per order set   Diet:  cardiac  GI prophylaxis: Not indicated at this point, re-assess if needed.   Pain, sleep, and vomiting medications: Per order set  Code Status:  He is DNR/DNI but ok for defibrillation, of for pressors, and NIPPV if needed - witnessed by niece   COVID test result:  negative   COVID vaccination: completed   Barriers to discharge: admitting clinical condition  Discharge Disposition and goals:  Unable to determine at this point, pending clinical progress and response to treatment. Patient may need transfer to SNF or [facility] if unsafe to go home and needed treatment inappropriate at home setting OR may need home health care evaluation if care can be delivered at home settings. Consider referral to care manager/        Care plan was created based on available information provided, including patient's condition at the time of encounter.   This plan was discussed with patient and/or family  members using layman's terms and have agreed to proceed.   At the end of night shift (9PM - 730A), this case will be presented to the AM Hospitalist.    It is recommended to revise care plan if there is change in condition and/or new clinical information that are not available during my encounter.     All or some of home medication/s were not resumed on admission due to safety reasons or contraindications. Dosing and frequency may also have been modified. Please resume/review them during your visit.     70 minutes of total visit duration and greater than 50% was spent in direct evaluation of patient and coordination of care including discussion of diagnostic test results and recommended treatment. .      Valentin Almonte MD, MPH, FACP, [facility]  Internal Medicine - Hospitalist        Chief Complaint SOB      HISTORY     - He sees cardiology for severe CHF with most recent EF of 20-25% based on  ECHO done 6/21  - He also had a recent LHC done 6/2021 and based on findings, it was recommended to continue medical management.  - he has been on Entresto (on going titration), Metoprolol, lasix (on going titration), digoxin, ASA and eliquis - compliance is good as staff at [facility] gives these to him  - He came in today because of SOB worse on exertion. He also felt he might have gained weight but not known how much.  - He denies cough due to infection, no fever and no chest pain.  - He said, he drinks a lot of water. His meals are prepared by [facility] staff and pay attention to salt intake.  - In the ED, his BNP was over 2000 and his chest XR showed congestion. WBC was 4.2 with Hgb of 9. He was given 40 mgs of lasix.     - ROS --- No headache. No dizziness. No weakness. No palpitations. No abdominal pain. No nausea or vomiting. No urinary symptoms. No bleeding symptoms. No weight loss. Rest of 12 point ROS was reviewed and negative.       Past Medical History     Past Medical History:   Diagnosis Date     ACS (acute coronary syndrome) (H)  6/20/2021     Acute respiratory failure with hypoxia (H) 4/6/2017     STORM (acute kidney injury) (H) 9/22/2018     Arthritis      ASCVD (arteriosclerotic cardiovascular disease)      Atrial fibrillation (H) 2/5/2018     Atrial flutter (H) 2/22/2017     BMI 30.0-30.9,adult      BPH (benign prostatic hypertrophy)      CAD S/P percutaneous coronary angioplasty 1/11/2014     Cellulitis      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)      Chronic systolic heart failure (H) 6/6/2017     Confusion 6/20/2021     Coronary artery disease     triple bypass 1995     CRF (chronic renal failure), stage 3 (moderate) 1/20/2017     Critical lower limb ischemia 1/10/2014     Diabetes mellitus (H)      Diabetes mellitus, type 2 (H) 1/11/2014     Diabetic polyneuropathy (H)      Discitis 2/8/2017     Epidural abscess 2/9/2017     Fall 2/7/2017     History of blood transfusion      HTN (hypertension) 1/11/2014     Hyperglycemia 1/9/2020     Hyperlipidaemia      Hyperlipidemia with target LDL less than 100 1/11/2014    Formatting of this note might be different from the original. Overview:  Diagnosis updated by automated process. Provider to review and confirm. Formatting of this note might be different from the original. Diagnosis updated by automated process. Provider to review and confirm.     Hypertension      Long term current use of anticoagulant therapy 2/5/2018     Non-healing ulcer of foot (H)     Right     Noninflammatory pericardial effusion      Osteomyelitis (H) 1/11/2014     Osteomyelitis of foot, right, acute (H) 2/15/2017     Osteomyelitis of left foot (H)      PVD (peripheral vascular disease) (H)      Recurrent falls 6/20/2021     Sebaceous cyst          Surgical History     Past Surgical History:   Procedure Laterality Date     AMPUTATE LEG BELOW KNEE Right 10/8/2018    Procedure: AMPUTATE LEG BELOW KNEE;  OPEN RIGHT LOWER LEG AMPUTATION WITH WOUND VAC PLACEMENT    EBL: 50mL;  Surgeon:  Aamdor Vick MD;  Location:  OR     AMPUTATE REVISION STUMP LOWER EXTREMITY Right 10/11/2018    Procedure: AMPUTATE REVISION STUMP LOWER EXTREMITY;  CLOSURE RIGHT BELOW KNEE AMPUTATION;  Surgeon: Amador Vick MD;  Location:  OR     AMPUTATE TOE(S)  1/10/2014    Procedure: AMPUTATE TOE(S);;  Surgeon: Amador Vick MD;  Location:  OR     APPLY WOUND VAC Right 10/8/2018    Procedure: APPLY WOUND VAC;;  Surgeon: Amador Vick MD;  Location:  OR     BONE MARROW BIOPSY, BONE SPECIMEN, NEEDLE/TROCAR  10/19/2012    Procedure: BIOPSY BONE MARROW;  BONE MARROW BIOPSY  (CONSCIOUS SED);  Surgeon: Ramon Quesada MD;  Location:  GI     BYPASS GRAFT FEMOROPOPLITEAL  1/10/2014    Procedure: BYPASS GRAFT FEMOROPOPLITEAL;  Left above knee popliteal to  Below knee popliteal bypass using transverse saphenous vein graft. Left 2nd Toe amputation;  Surgeon: Amador Vick MD;  Location:  OR     CARDIAC SURGERY      angioplasty with stent, triple bypass     CV ANGIOGRAM CORONARY GRAFT N/A 6/22/2021    Procedure: Angiogram Coronary Graft;  Surgeon: Sury Sorenson MD;  Location:  HEART CARDIAC CATH LAB     CV HEART CATHETERIZATION WITH POSSIBLE INTERVENTION N/A 6/22/2021    Procedure: Heart Catheterization with Possible Intervention;  Surgeon: Sury Sorenson MD;  Location:  HEART CARDIAC CATH LAB     EXCISE CYST GENERIC (LOCATION) N/A 2/1/2016    Procedure: EXCISE CYST GENERIC (LOCATION);  Surgeon: Amador Vick MD;  Location:  SD     GRAFT VEIN FROM EXTREMITY (LOCATION)  1/10/2014    Procedure: GRAFT VEIN FROM EXTREMITY (LOCATION);;  Surgeon: Amador Vick MD;  Location:  OR     LAMINECTOMY THORACIC ONE LEVEL N/A 2/8/2017    Procedure: LAMINECTOMY THORACIC ONE LEVEL;  Surgeon: Marcelo Trent MD;  Location: [facility] OR     OPTICAL TRACKING SYSTEM FUSION POSTERIOR SPINE THORACIC THREE+ LEVELS N/A 2/12/2017    Procedure: OPTICAL TRACKING SYSTEM FUSION POSTERIOR SPINE  THORACIC THREE+ LEVELS;  Surgeon: Marcelo Trent MD;  Location: [facility] OR     OTHER SURGICAL HISTORY  2021    CV HEART CATH      OTHER SURGICAL HISTORY  2021    CV ANGIOGRAM CORONARY GRAFT     OTHER SURGICAL HISTORY  10/11/2018    AMPUTATE REVISION STUMP LOWER EXTREMITY     OTHER SURGICAL HISTORY  10/08/2018    APPLY WOUND VAC     OTHER SURGICAL HISTORY  10/08/2018    AMPUTATE LEG BELOW KNEE     OTHER SURGICAL HISTORY  2017    OPTICAL TRACKING SYSTEM FUSION POSTERIOR SPINE THORACIC THREE + LEVELS     TONSILLECTOMY       VASCULAR SURGERY      ptcr legs        Family History      Family History   Problem Relation Age of Onset     Cancer Mother         leukemia     Leukemia Mother          Social History      .  Social History     Socioeconomic History     Marital status: Single     Spouse name: Not on file     Number of children: Not on file     Years of education: Not on file     Highest education level: Not on file   Occupational History     Not on file   Tobacco Use     Smoking status: Former Smoker     Packs/day: 2.00     Years: 30.00     Pack years: 60.00     Types: Cigarettes     Quit date: 1995     Years since quittin.7     Smokeless tobacco: Never Used   Substance and Sexual Activity     Alcohol use: No     Drug use: No     Sexual activity: Not on file   Other Topics Concern     Parent/sibling w/ CABG, MI or angioplasty before 65F 55M? Not Asked   Social History Narrative    Lives independently with SO. 2017      Social Determinants of Health     Financial Resource Strain:      Difficulty of Paying Living Expenses:    Food Insecurity:      Worried About Running Out of Food in the Last Year:      Ran Out of Food in the Last Year:    Transportation Needs:      Lack of Transportation (Medical):      Lack of Transportation (Non-Medical):    Physical Activity:      Days of Exercise per Week:      Minutes of Exercise per Session:    Stress:      Feeling of Stress :    Social  Connections:      Frequency of Communication with Friends and Family:      Frequency of Social Gatherings with Friends and Family:      Attends Buddhism Services:      Active Member of Clubs or Organizations:      Attends Club or Organization Meetings:      Marital Status:    Intimate Partner Violence:      Fear of Current or Ex-Partner:      Emotionally Abused:      Physically Abused:      Sexually Abused:           Allergies        Allergies   Allergen Reactions     Blood Transfusion Related (Informational Only) Other (See Comments)     Patient has a history of a clinically significant antibody against RBC antigens.  A delay in compatible RBCs may occur.      Blood-Group Specific Substance Other (See Comments)     Patient has a suggestive Warm Auto-antibody. Blood products may be delayed. Draw patient 24 hours prior to transfusion. Draw one red top and two purple top tubes for all type and screen orders.         Prior to Admission Medications      No current facility-administered medications on file prior to encounter.  acetaminophen (TYLENOL) 325 MG tablet, Take 650 mg by mouth every 4 hours as needed for mild pain or fever  allopurinol (ZYLOPRIM) 300 MG tablet, Take 300 mg by mouth daily   apixaban ANTICOAGULANT (ELIQUIS ANTICOAGULANT) 5 MG tablet, Take 1 tablet (5 mg) by mouth 2 times daily  aspirin (ASA) 81 MG chewable tablet, Take 81 mg by mouth daily   atorvastatin (LIPITOR) 20 MG tablet, Take 1 tablet (20 mg) by mouth daily  Mahsa Protect (EUCERIN) external cream, Apply topically 2 times daily as needed for dry skin or other Apply as needed to intact stage 1 pressure area and/or irritated skin, rash or excoriation  bisacodyl (DULCOLAX) 10 MG suppository, Place 10 mg rectally daily as needed for constipation If no BM or small BM for 3 days/9 shifts  cholecalciferol 1000 UNITS TABS, Take 1,000 Units by mouth daily  citalopram (CELEXA) 20 MG tablet, Take 20 mg by mouth every morning   digoxin (LANOXIN) 125 MCG  tablet, Take 1 tablet (125 mcg) by mouth daily  ENTRESTO 49-51 MG per tablet, TAKE 1.5 TABLETS BY MOUTH IN THE MORNING AND 1.5 TABLETS IN THE EVENING.  furosemide (LASIX) 20 MG tablet, Take 0.5 tablets (10 mg) by mouth daily  guaiFENesin (ROBITUSSIN) 100 MG/5ML SYRP, Take 10 mLs by mouth every 4 hours as needed for cough For cold symptoms and non productive cough  insulin glargine (LANTUS PEN) 100 UNIT/ML pen, Inject 30 Units Subcutaneous At Bedtime  insulin lispro (HUMALOG KWIKPEN) 100 UNIT/ML (1 unit dial) KWIKPEN, Inject 12 Units Subcutaneous 2 times daily (before meals) Breakfast and lunch  insulin lispro (HUMALOG KWIKPEN) 100 UNIT/ML (1 unit dial) KWIKPEN, Inject 14 Units Subcutaneous daily (with dinner)  loperamide (IMODIUM A-D) 2 MG tablet, Take 2-4 mg by mouth 4 times daily as needed for diarrhea Take 4 mg after 1st loose stool, then take 1 tablet as needed after each loose stool  magnesium hydroxide (MILK OF MAGNESIA) 400 MG/5ML suspension, Take 30 mLs by mouth daily as needed for constipation or heartburn If no BM/small BM for 3 days/9 shifts  metFORMIN (GLUCOPHAGE-XR) 750 MG 24 hr tablet, Take 750 mg by mouth every morning  metoprolol succinate ER (TOPROL-XL) 100 MG 24 hr tablet, Take 1 tablet (100 mg) by mouth daily  neomycin-bacitracin-polymyxin (NEOSPORIN) 5-400-5000 ointment, Apply 1 each topically 3 times daily as needed (for minor abrasions)  nitroglycerin (NITROSTAT) 0.4 MG sublingual tablet, For chest pain place 1 tablet under the tongue every 5 minutes for 3 doses. If symptoms persist 5 minutes after 1st dose call 911.  nystatin (MYCOSTATIN) 778138 UNIT/GM external powder, Apply topically 2 times daily as needed (to affected area(s) as needed)  pantoprazole (PROTONIX) 40 MG EC tablet, Take 40 mg by mouth daily  traZODone (DESYREL) 50 MG tablet, Take 50 mg by mouth At Bedtime  venetoclax (VENCLEXTA) 100 MG tablet, Take 200 mg by mouth daily  alcohol swab prep pads, Use to swab area of  injection/jina as directed.  blood glucose (NO BRAND SPECIFIED) test strip, Use to test blood sugar 4 times daily or as directed.  blood glucose calibration (NO BRAND SPECIFIED) solution, 1 drop every 14 days Use 1 drop to calibrate blood glucose monitor every 14 days  blood glucose monitoring (NO BRAND SPECIFIED) meter device kit, Use to test blood sugar 4 times daily or as directed.  order for DME, Equipment to be ordered:  Scale- Qty 1  Commander Flex - Qty. 1  Pulse-Oximeter - Qty. 1  Use as directed  order for DME, Equipment being ordered: DH2 shoe  thin (NO BRAND SPECIFIED) lancets, 1 each 4 times daily Use with lanceting device.  ULTICARE SHORT 31G X 8 MM insulin pen needle,             Review of Systems     A 12 point comprehensive review of systems was negative except as noted above in HPI.    PHYSICAL EXAMINATION       Vitals      Vitals: BP (!) 188/87   Pulse 86   Temp 97.7  F (36.5  C) (Oral)   Resp 25   Wt 83.5 kg (184 lb)   SpO2 94%   BMI 25.96 kg/m    BMI= Body mass index is 25.96 kg/m .      Examination     General Appearance:  Alert, cooperative, no distress  Head:    Normocephalic, without obvious abnormality, atraumatic  EENT:  PERRL, conjunctiva/corneas clear, EOM's intact.   Neck:   Supple, symmetrical, trachea midline, no adenopathy; no NVE  Back:  Symmetric, no curvature, no CVA tenderness  Chest/Lungs: Clear to auscultation bilaterally, respirations unlabored, No tenderness or deformity. No abdominal breathing or use of accessory muscles.   Heart:    Regular rate and rhythm, S1 and S2 normal, no murmur, rub   or gallop  Abdomen: Soft, non-tender, bowel sounds active all four quadrants, not peritoneal on palpation. Not distended  Extremities:  Extremities normal, atraumatic, left leg swelling, surgically absent right leg   Skin:  Skin color, texture, turgor normal, no rashes or lesion  Neurologic:  Awake and alert, No lateralizing or localizing signs         Pertinent Lab     Results  for orders placed or performed during the hospital encounter of 09/22/21   XR Chest Port 1 View    Impression    IMPRESSION: Cardiomegaly. Pulmonary vascularity normal. Mildly prominent interstitial pattern both lungs may represent edema. No airspace infiltrates or pleural effusions. Prior median sternotomy and CABG. Thoracic spinal stabilization rods.   Comprehensive metabolic panel   Result Value Ref Range    Sodium 141 136 - 145 mmol/L    Potassium 4.4 3.5 - 5.0 mmol/L    Chloride 108 (H) 98 - 107 mmol/L    Carbon Dioxide (CO2) 22 22 - 31 mmol/L    Anion Gap 11 5 - 18 mmol/L    Urea Nitrogen 18 8 - 28 mg/dL    Creatinine 1.39 (H) 0.70 - 1.30 mg/dL    Calcium 9.6 8.5 - 10.5 mg/dL    Glucose 61 (L) 70 - 125 mg/dL    Alkaline Phosphatase 89 45 - 120 U/L    AST 19 0 - 40 U/L    ALT 14 0 - 45 U/L    Protein Total 6.4 6.0 - 8.0 g/dL    Albumin 3.3 (L) 3.5 - 5.0 g/dL    Bilirubin Total 0.8 0.0 - 1.0 mg/dL    GFR Estimate 48 (L) >60 mL/min/1.73m2   Result Value Ref Range    Troponin I 0.06 0.00 - 0.29 ng/mL   B-Type Natriuretic Peptide ([facility] Only)   Result Value Ref Range    BNP 2,340 (H) 0 - 82 pg/mL   CBC with platelets and differential   Result Value Ref Range    WBC Count 4.2 4.0 - 11.0 10e3/uL    RBC Count 3.29 (L) 4.40 - 5.90 10e6/uL    Hemoglobin 9.9 (L) 13.3 - 17.7 g/dL    Hematocrit 31.4 (L) 40.0 - 53.0 %    MCV 95 78 - 100 fL    MCH 30.1 26.5 - 33.0 pg    MCHC 31.5 31.5 - 36.5 g/dL    RDW 17.3 (H) 10.0 - 15.0 %    Platelet Count 145 (L) 150 - 450 10e3/uL    % Neutrophils 69 %    % Lymphocytes 14 %    % Monocytes 14 %    % Eosinophils 1 %    % Basophils 1 %    % Immature Granulocytes 1 %    NRBCs per 100 WBC 0 <1 /100    Absolute Neutrophils 3.0 1.6 - 8.3 10e3/uL    Absolute Lymphocytes 0.6 (L) 0.8 - 5.3 10e3/uL    Absolute Monocytes 0.6 0.0 - 1.3 10e3/uL    Absolute Eosinophils 0.0 0.0 - 0.7 10e3/uL    Absolute Basophils 0.0 0.0 - 0.2 10e3/uL    Absolute Immature Granulocytes 0.0 <=0.0 10e3/uL    Absolute  NRBCs 0.0 10e3/uL   Result Value Ref Range    Digoxin 0.8   ug/L   ECG 12-LEAD WITH MUSE ([facility])   Result Value Ref Range    Systolic Blood Pressure  mmHg    Diastolic Blood Pressure  mmHg    Ventricular Rate 84 BPM    Atrial Rate 84 BPM    OR Interval 232 ms    QRS Duration 136 ms     ms    QTc 458 ms    P Axis 89 degrees    R AXIS -32 degrees    T Axis 118 degrees    Interpretation ECG       Sinus rhythm with 1st degree A-V block  Left axis deviation  Left ventricular hypertrophy with QRS widening and repolarization abnormality  Abnormal ECG  When compared with ECG of 12-JUL-2021 14:20,  No significant change was found  Confirmed by SEE ED PROVIDER NOTE FOR, ECG INTERPRETATION ([id number]),  JOHNNY DAVEY ([id number]) on 9/22/2021 6:55:18 PM             Pertinent Radiology

## 2021-09-23 NOTE — CONSULTS
Care Management Initial Consult    General Information  Assessment completed with: Patient,    Type of CM/SW Visit: Initial Assessment    Primary Care Provider verified and updated as needed: Yes   Readmission within the last 30 days: no previous admission in last 30 days      Reason for Consult: discharge planning  Advance Care Planning: Advance Care Planning Reviewed: verified with patient          Communication Assessment  Patient's communication style: spoken language (English or Bilingual)    Hearing Difficulty or Deaf: no   Wear Glasses or Blind: no    Cognitive  Cognitive/Neuro/Behavioral: WDL                      Living Environment:   People in home: alone     Current living Arrangements: assisted living  Name of Facility: [facility]   Able to return to prior arrangements: yes       Family/Social Support:  Care provided by: other (see comments) ([facility] staff)  Provides care for: no one  Marital Status: Single  Facility resident(s)/Staff, Sibling(s), Other (specify) (3 nieces)          Description of Support System: Supportive         Current Resources:   Patient receiving home care services: No     Community Resources: None  Equipment currently used at home: walker, rolling, wheelchair, manual, grab bar, tub/shower  Supplies currently used at home: None    Employment/Financial:  Employment Status: retired        Financial Concerns: No concerns identified   Referral to Financial Counselor: No       Lifestyle & Psychosocial Needs:  Social Determinants of Health     Tobacco Use: Medium Risk     Smoking Tobacco Use: Former Smoker     Smokeless Tobacco Use: Never Used   Alcohol Use:      Frequency of Alcohol Consumption:      Average Number of Drinks:      Frequency of Binge Drinking:    Financial Resource Strain:      Difficulty of Paying Living Expenses:    Food Insecurity:      Worried About Running Out of Food in the Last Year:      Ran Out of Food in the Last Year:    Transportation Needs:       Lack of Transportation (Medical):      Lack of Transportation (Non-Medical):    Physical Activity:      Days of Exercise per Week:      Minutes of Exercise per Session:    Stress:      Feeling of Stress :    Social Connections:      Frequency of Communication with Friends and Family:      Frequency of Social Gatherings with Friends and Family:      Attends Congregational Services:      Active Member of Clubs or Organizations:      Attends Club or Organization Meetings:      Marital Status:    Intimate Partner Violence:      Fear of Current or Ex-Partner:      Emotionally Abused:      Physically Abused:      Sexually Abused:    Depression: Not at risk     PHQ-2 Score: 0   Housing Stability:      Unable to Pay for Housing in the Last Year:      Number of Places Lived in the Last Year:      Unstable Housing in the Last Year:        Functional Status:  Prior to admission patient needed assistance:   Dependent ADLs:: Wheelchair-independent, Ambulation-walker, Bathing  Dependent IADLs:: Transportation, Medication Management       Mental Health Status:          Chemical Dependency Status:                Values/Beliefs:  Spiritual, Cultural Beliefs, Congregational Practices, Values that affect care: no               Additional Information:  The patient just recently moved to [facility]) in [place], and he receives daily safety check-ins and medication assistance. To contact the RN at the facility, [facility], call [phone]. At baseline, he normally uses a 4-wheel walker, or a transporter wheelchair for longer distances, and he has a prosthetic limb for his lower, right extremity. He also receives meals at the facility. For transportation, he has family members, 2 nieces, a brother, and a sister-in-law, who help, and he uses [employer]. The [facility] is capable of providing PT and OT for the patient at discharge, and the plan is for the patient to return home, pending progress. Transport may need to be  arranged. CM to follow.    Adarsh Goyal RN

## 2021-09-23 NOTE — PROGRESS NOTES
[facility] MEDICINE PROGRESS NOTE      Identification/Summary: Tad Manning is a 80 year old male with a past medical history of chronic systolic heart failure, Type 2 Diabetes Mellitus, hypertension, chronic kidney disease, chronic lymphocytic leukemia, atrial fibrillation, hyperlipidemia, GERD, and right BKA who was admitted on 9/22/2021 for shortness of breath.      Assessment and Plan:  Acute CHF exacerbation with most recent EF of 20-25%. Last [facility] was June 2021. Patient received one dose of IV furosemide last evening and has been on 2L of oxygen since arrival to hospital. He has diuresed 1,500 mL since last night. Plan: Continue sacubitril-valsartan (entresto), digoxin, metoprolol succinate, and supplemental O2. Follow cardiology consult's recommendations - discontinue p.o. furosemide, resume IV furosemide today at 20 mg IV every 12 hours and observe response.  Cardiology added spironolactone 12.5 mg daily and monitor potassium.     Type 2 Diabetes Mellitis. Continue Glargine and Novolog - patient agreed to doing insulin sliding scale with meals. Continue to hold Metformin.     Stage 3 Chronic Kidney Disease. On 7/12/2021 patient's creatinine level was 1.11 and GFR estimate was 63. Since than, patient has had elevated Cr levels and low GFR estimates. As of today, 9/23/2021, patient's creatinine level was 1.42 with a GFR estimate of 47. Plan: Continue to monitor closely.     CLL diagnosed about 15 years ago according to patient. Patient is on Venetoclax.    Atrial fibrillation on apixaban (eliquis).     Hyperlipidemia on atorvastatin.     GERD well controlled on pantoprazole.     Diet: Combination Diet Low Saturated Fat Na <2400mg Diet, No Caffeine Diet  DVT Prophylaxis:  Patient is on Apixaban (Eliquis)  Code Status: Special Code - DNR/DNI but ok for defibrillation, or for pressors, and NIPPV if needed - per H&P.    Disposition:   Anticipated possible discharge in a couple days  "days to home (assisted living) once patient's symptoms are under control and he is responding well to treatment.    PRIMO Covarrubias    Patient seen and examined  Patient discussed with physician assistant student  Agree with assessment and plan as outlined above    Sandip Andre MD  [facility]  Phone: #[phone]    Interval History/Subjective:  Tad \"Hima\" Nigel is a 80 year old male who began feeling increased shortness of breath four days ago (9/18) that worsened over the last couple days. He noticed he needed to rest more while walking in order to catch his breath and was having difficulty sleeping at night due to the increased shortness of breath. He became more concerned as it worsened so decided to come to the hospital. Of note, according to patient's ED Provider Notes, patient feels he's been drinking fluids but urinating less than usual. Patient said he has had a previous episode similar to this, due to dehydration. He is not experiencing any chest pain, nausea, or lightheadedness.       Physical Exam/Objective:  Temp:  [97.4  F (36.3  C)-98.4  F (36.9  C)] 97.9  F (36.6  C)  Pulse:  [68-89] 68  Resp:  [18-30] 22  BP: (146-188)/(68-87) 146/68  SpO2:  [91 %-99 %] 92 %  Body mass index is 25.93 kg/m .    GENERAL:  Alert, in no acute distress, appears stated age   HEAD:  Normocephalic, without obvious abnormality, atraumatic   EYES:  PERRL, conjunctiva/corneas clear, no scleral icterus, EOM's intact   NOSE: Nares normal, septum midline, mucosa normal, no drainage   THROAT: Lips, mucosa, and tongue normal; teeth and gums normal, mouth moist   NECK: Supple, symmetrical, trachea midline   BACK:   Symmetric, no curvature, ROM normal   LUNGS:    Mild rales over the bases bilaterally.  Good air movement.  No increased respiratory effort.   CHEST WALL:  No tenderness or deformity   HEART:  Regular rate and rhythm, S1 and S2 normal, no murmur, " rub, or gallop    ABDOMEN:   Soft, non-tender, bowel sounds active all four quadrants, no masses, no organomegaly, no rebound or guarding   EXTREMITIES: Right BKA and left toe amputation. Could not view left toes due to bandages present. No cyanosis or edema. Radial pulses 2+ bilaterally and left dorsalis pedis pulse 2+.    SKIN: Dry to touch, no exanthems in the visualized areas. Erythematous, dry patches present on right knee - likely due to prosthesis.    NEURO: Alert, appears cognitively intact, moves all three extremities freely   PSYCH: Cooperative, behavior is appropriate      Data reviewed today: I personally reviewed all new medications, labs, imaging/diagnostics reports over the past 24 hours.     Imaging:   Recent Results (from the past 24 hour(s))   XR Chest Port 1 View    Narrative    EXAM: XR CHEST PORT 1 VIEW  LOCATION: [facility]  DATE/TIME: 9/22/2021 6:21 PM    INDICATION: sob, chf  COMPARISON: 06/20/2021       Impression    IMPRESSION: Cardiomegaly. Pulmonary vascularity normal. Mildly prominent interstitial pattern both lungs may represent edema. No airspace infiltrates or pleural effusions. Prior median sternotomy and CABG. Thoracic spinal stabilization rods.       Labs:  Most Recent 3 CBC's:Recent Labs   Lab Test 09/23/21  0505 09/22/21 1957 06/26/21  0618   WBC 3.0* 4.2 3.7*   HGB 9.3* 9.9* 9.6*   MCV 94 95 93   * 145* 136*     Most Recent 3 Creatinines:Recent Labs   Lab Test 09/23/21  0505 09/22/21 1957 08/26/21  1002   CR 1.42* 1.39* 1.44*     Most Recent 6 glucoses:Recent Labs   Lab Test 09/23/21  0654 09/23/21  0505 09/22/21  2229 09/22/21 1957 08/26/21  1002 08/16/21  1037   * 184* 128* 61* 287* 228*       Medications:   Personally Reviewed.  Medications     - MEDICATION INSTRUCTIONS -         apixaban ANTICOAGULANT  5 mg Oral BID     aspirin  81 mg Oral Daily     atorvastatin  20 mg Oral Daily     citalopram  20 mg Oral QAM     digoxin  125 mcg  Oral Daily     furosemide  10 mg Oral Daily     insulin glargine  30 Units Subcutaneous At Bedtime     magnesium sulfate  2 g Intravenous Once     metoprolol succinate ER  100 mg Oral Daily     pantoprazole  40 mg Oral Daily     sacubitril-valsartan  1 tablet Oral BID     sodium chloride (PF)  3 mL Intracatheter Q8H     traZODone  50 mg Oral At Bedtime     venetoclax  200 mg Oral Daily with supper     Vitamin D3  1,000 Units Oral Daily

## 2021-09-24 LAB
ANION GAP SERPL CALCULATED.3IONS-SCNC: 9 MMOL/L (ref 5–18)
BASOPHILS # BLD AUTO: 0 10E3/UL (ref 0–0.2)
BASOPHILS NFR BLD AUTO: 0 %
BUN SERPL-MCNC: 23 MG/DL (ref 8–28)
CALCIUM SERPL-MCNC: 9.1 MG/DL (ref 8.5–10.5)
CHLORIDE BLD-SCNC: 105 MMOL/L (ref 98–107)
CO2 SERPL-SCNC: 24 MMOL/L (ref 22–31)
CREAT SERPL-MCNC: 1.37 MG/DL (ref 0.7–1.3)
EOSINOPHIL # BLD AUTO: 0 10E3/UL (ref 0–0.7)
EOSINOPHIL NFR BLD AUTO: 1 %
ERYTHROCYTE [DISTWIDTH] IN BLOOD BY AUTOMATED COUNT: 17.2 % (ref 10–15)
GFR SERPL CREATININE-BSD FRML MDRD: 49 ML/MIN/1.73M2
GLUCOSE BLD-MCNC: 146 MG/DL (ref 70–125)
GLUCOSE BLDC GLUCOMTR-MCNC: 135 MG/DL (ref 70–99)
GLUCOSE BLDC GLUCOMTR-MCNC: 160 MG/DL (ref 70–99)
GLUCOSE BLDC GLUCOMTR-MCNC: 204 MG/DL (ref 70–99)
GLUCOSE BLDC GLUCOMTR-MCNC: 236 MG/DL (ref 70–99)
HCT VFR BLD AUTO: 31.3 % (ref 40–53)
HGB BLD-MCNC: 10.1 G/DL (ref 13.3–17.7)
IMM GRANULOCYTES # BLD: 0 10E3/UL
IMM GRANULOCYTES NFR BLD: 1 %
LYMPHOCYTES # BLD AUTO: 0.7 10E3/UL (ref 0.8–5.3)
LYMPHOCYTES NFR BLD AUTO: 20 %
MAGNESIUM SERPL-MCNC: 2 MG/DL (ref 1.8–2.6)
MCH RBC QN AUTO: 30.1 PG (ref 26.5–33)
MCHC RBC AUTO-ENTMCNC: 32.3 G/DL (ref 31.5–36.5)
MCV RBC AUTO: 93 FL (ref 78–100)
MONOCYTES # BLD AUTO: 0.5 10E3/UL (ref 0–1.3)
MONOCYTES NFR BLD AUTO: 16 %
NEUTROPHILS # BLD AUTO: 2.2 10E3/UL (ref 1.6–8.3)
NEUTROPHILS NFR BLD AUTO: 62 %
NRBC # BLD AUTO: 0 10E3/UL
NRBC BLD AUTO-RTO: 0 /100
PLATELET # BLD AUTO: 109 10E3/UL (ref 150–450)
POTASSIUM BLD-SCNC: 4.2 MMOL/L (ref 3.5–5)
RBC # BLD AUTO: 3.36 10E6/UL (ref 4.4–5.9)
SODIUM SERPL-SCNC: 138 MMOL/L (ref 136–145)
WBC # BLD AUTO: 3.5 10E3/UL (ref 4–11)

## 2021-09-24 PROCEDURE — 210N000002 HC R&B HEART CARE

## 2021-09-24 PROCEDURE — 250N000013 HC RX MED GY IP 250 OP 250 PS 637: Performed by: FAMILY MEDICINE

## 2021-09-24 PROCEDURE — C9399 UNCLASSIFIED DRUGS OR BIOLOG: HCPCS | Performed by: FAMILY MEDICINE

## 2021-09-24 PROCEDURE — 250N000012 HC RX MED GY IP 250 OP 636 PS 637: Performed by: INTERNAL MEDICINE

## 2021-09-24 PROCEDURE — 85025 COMPLETE CBC W/AUTO DIFF WBC: CPT | Performed by: FAMILY MEDICINE

## 2021-09-24 PROCEDURE — 250N000012 HC RX MED GY IP 250 OP 636 PS 637: Performed by: FAMILY MEDICINE

## 2021-09-24 PROCEDURE — 250N000013 HC RX MED GY IP 250 OP 250 PS 637: Performed by: INTERNAL MEDICINE

## 2021-09-24 PROCEDURE — 99232 SBSQ HOSP IP/OBS MODERATE 35: CPT | Performed by: FAMILY MEDICINE

## 2021-09-24 PROCEDURE — 36415 COLL VENOUS BLD VENIPUNCTURE: CPT | Performed by: FAMILY MEDICINE

## 2021-09-24 PROCEDURE — 99233 SBSQ HOSP IP/OBS HIGH 50: CPT | Performed by: INTERNAL MEDICINE

## 2021-09-24 PROCEDURE — 83735 ASSAY OF MAGNESIUM: CPT | Performed by: INTERNAL MEDICINE

## 2021-09-24 PROCEDURE — 80048 BASIC METABOLIC PNL TOTAL CA: CPT | Performed by: FAMILY MEDICINE

## 2021-09-24 PROCEDURE — 250N000011 HC RX IP 250 OP 636: Performed by: INTERNAL MEDICINE

## 2021-09-24 RX ORDER — FUROSEMIDE 40 MG
40 TABLET ORAL DAILY
Status: DISCONTINUED | OUTPATIENT
Start: 2021-09-24 | End: 2021-09-25

## 2021-09-24 RX ADMIN — APIXABAN 5 MG: 5 TABLET, FILM COATED ORAL at 20:40

## 2021-09-24 RX ADMIN — INSULIN ASPART 14 UNITS: 100 INJECTION, SOLUTION INTRAVENOUS; SUBCUTANEOUS at 17:27

## 2021-09-24 RX ADMIN — VENETOCLAX 200 MG: 100 TABLET, FILM COATED ORAL at 17:28

## 2021-09-24 RX ADMIN — FUROSEMIDE 40 MG: 40 TABLET ORAL at 11:23

## 2021-09-24 RX ADMIN — SACUBITRIL AND VALSARTAN 1 TABLET: 97; 103 TABLET, FILM COATED ORAL at 11:23

## 2021-09-24 RX ADMIN — ASPIRIN 81 MG: 81 TABLET, CHEWABLE ORAL at 09:33

## 2021-09-24 RX ADMIN — METOPROLOL SUCCINATE 100 MG: 100 TABLET, EXTENDED RELEASE ORAL at 09:33

## 2021-09-24 RX ADMIN — INSULIN GLARGINE 30 UNITS: 100 INJECTION, SOLUTION SUBCUTANEOUS at 20:41

## 2021-09-24 RX ADMIN — ATORVASTATIN CALCIUM 20 MG: 10 TABLET, FILM COATED ORAL at 09:33

## 2021-09-24 RX ADMIN — PANTOPRAZOLE SODIUM 40 MG: 20 TABLET, DELAYED RELEASE ORAL at 09:33

## 2021-09-24 RX ADMIN — CITALOPRAM HYDROBROMIDE 20 MG: 20 TABLET ORAL at 06:28

## 2021-09-24 RX ADMIN — SACUBITRIL AND VALSARTAN 1 TABLET: 97; 103 TABLET, FILM COATED ORAL at 20:40

## 2021-09-24 RX ADMIN — APIXABAN 5 MG: 5 TABLET, FILM COATED ORAL at 09:33

## 2021-09-24 RX ADMIN — INSULIN ASPART 1 UNITS: 100 INJECTION, SOLUTION INTRAVENOUS; SUBCUTANEOUS at 17:26

## 2021-09-24 RX ADMIN — INSULIN ASPART 12 UNITS: 100 INJECTION, SOLUTION INTRAVENOUS; SUBCUTANEOUS at 09:38

## 2021-09-24 RX ADMIN — INSULIN ASPART 12 UNITS: 100 INJECTION, SOLUTION INTRAVENOUS; SUBCUTANEOUS at 12:20

## 2021-09-24 RX ADMIN — Medication 1000 UNITS: at 09:33

## 2021-09-24 RX ADMIN — DIGOXIN 125 MCG: 0.12 TABLET ORAL at 09:33

## 2021-09-24 RX ADMIN — INSULIN ASPART 2 UNITS: 100 INJECTION, SOLUTION INTRAVENOUS; SUBCUTANEOUS at 12:20

## 2021-09-24 RX ADMIN — SPIRONOLACTONE 12.5 MG: 25 TABLET ORAL at 09:33

## 2021-09-24 RX ADMIN — FUROSEMIDE 20 MG: 10 INJECTION, SOLUTION INTRAMUSCULAR; INTRAVENOUS at 04:36

## 2021-09-24 RX ADMIN — TRAZODONE HYDROCHLORIDE 50 MG: 50 TABLET ORAL at 20:40

## 2021-09-24 ASSESSMENT — MIFFLIN-ST. JEOR: SCORE: 1572.21

## 2021-09-24 NOTE — PROGRESS NOTES
Cardiology Progress Note    Assessment:  1.  Heart failure with reduced ejection fraction.  History of coronary artery disease with reduced ejection fraction of 20 to 25%.  Records from [facility] have been reviewed.  He is followed closely in the heart failure clinic.  Recently he was transitioned to Entresto and the dose was increased.  There was discussion about spironolactone and empagliflozin.  Spironolactone initiated yesterday.  Net output of 3 L.  Weight if accurate 81.4 kg compared to 86.7 kg yesterday.  Patient receiving relatively low-dose IV Lasix 20 mg every 12 hours.  At home has only been on 20 mg of oral Lasix most recently.  BUN and creatinine are stable today.  Creatinine mildly elevated at 1.37.  Cardiologist is .  Most recent notes are reviewed.  Patient had a non-ST segment myocardial infarction a few months ago.  Coronary angiogram described below with no intervention pursued.  At that time he was transitioned to Entresto.  Note indicates possible CRT if EF does not improve.  BNP on admission 2340.    2.  Ischemic cardiomyopathy.  Recent coronary angiogram from a few months ago at [facility] reviewed.  Medical management was pursued at that time.  Patient reports that he has follow-up with his allergist at the [facility] next week.  I have sent a my chart note letting her know that the patient is in the hospital.  Patient indicates that there was some discussion about consideration of defibrillator.    3.  Anemia.  Defer to primary care.  4.  History of CLL.  5.  History by report of paroxysmal atrial fibrillation.  He has been on apixaban.  Has 2 vascular score of 6 on apixaban.  He is also on digoxin.  Recent digoxin level 0.8.  6.  Hyperlipidemia on atorvastatin.  LDL cholesterol 49 June 21, 2021.  7.  Hypertension.  Blood pressures trending elevated.  Currently on metoprolol  mg daily.  Entresto 49/51.  Spironolactone yesterday 12.5 mg daily.  Active  Problems:    Hypoxia    Acute on chronic congestive heart failure, unspecified heart failure type (H)      Plan:  1.  Transition to oral furosemide will start 40 mg daily he was on 10 mg daily at home.  2.  Increase activity.  Question PT evaluation per primary care.  If not yet completed.  3.  Continue with Entresto, metoprolol, recently added spironolactone and digoxin.  Outpatient consideration of SGLT2 inhibitors per his primary cardiologist.  4.  Patient reports that he has an upcoming follow-up with his primary cardiologist will need to confirm.  5.  Core clinic consultation.  6.  Increase Entresto to target dose of 97/103 and observe response.  It appears that he was increased to the current dose of Entresto in July.  Monitor blood pressure and renal function.  7.  Epic note sent to his primary cardiologist.    Subjective:   Tad FREDERICK Mackenzietaylor is seen in follow-up.  Notes are reviewed.  He reports feeling well today.  He is up and getting ready for breakfast.  He feels as if his breathing is improved.  He states has not ambulated yet today.  He believes he has an appointment next week with his cardiologist and will need to confirm that.  Nursing notes some forgetfulness.  He  seems more responsive to questions today.    Objective:   Vital signs in last 24 hours:  VITALS: BP (!) 149/74 (BP Location: Left arm)   Pulse 68   Temp 97.2  F (36.2  C) (Oral)   Resp 19   Ht 1.829 m (6')   Wt 81.4 kg (179 lb 8 oz)   SpO2 95%   BMI 24.34 kg/m    BMI: Body mass index is 24.34 kg/m .  Wt Readings from Last 3 Encounters:   09/24/21 81.4 kg (179 lb 8 oz)   08/26/21 82.7 kg (182 lb 4.8 oz)   07/30/21 79.8 kg (176 lb)       Intake/Output Summary (Last 24 hours) at 9/24/2021 0656  Last data filed at 9/24/2021 043  Gross per 24 hour   Intake 600 ml   Output 1650 ml   Net -1050 ml       Physical Exam: Alert, comfortable, no acute distress.   Neck: Jugular venous pressure does not appear obviously elevated today.   Lungs:  clear to auscultation, mildly diminished at the bases.   COR: RRR, distant heart tones, soft systolic murmur.   Abd: nondistended, BS present, nontender, no rebound.   Extrem: Edema of the left lower extremity.  Right BKA with prosthesis in place.  Neuro: Alert and oriented.    Cardiographics:    ECG: Sinus rhythm, LVH, T wave little flatter in the lateral leads when compared to the prior EKG.  Echocardiogram:  TTE 6/20/21  1. Left ventricular systolic function is severely reduced. The visual ejection  fraction is estimated at 20-25%.  2. The right ventricle is normal in structure, function and size.  3. The left atrium is moderately dilated.  4. The aortic valve is trileaflet with aortic valve sclerosis.  5. There is mild (1+) mitral regurgitation.    [facility] 6/19/21    1.  Severe multivessel CAD s/p CABG.                LM has severe stenosis.                LAD has sever proximal stenosis and midLAD is occluded. Distal LAD fills via patent LIMA.                LCx is occluded.                 RCA has moderate diffuse disease.  2.  LIMA to LAD is patent.  Mid to distal LAD stents are patent.  3.  SVG to OM is patent.  4.  SVG to Diagonal is patent with a 70% stenosis and supplies diagonal branches are small and diffusely diseased  ______________________________________________________________________________    Telemetry: Sinus rhythm.  Short runs of nonsustained ventricular tachycardia.    Imaging:   Narrative & Impression   EXAM: XR CHEST PORT 1 VIEW  LOCATION: [facility]  DATE/TIME: 9/22/2021 6:21 PM     INDICATION: sob, chf  COMPARISON: 06/20/2021                                                                       IMPRESSION: Cardiomegaly. Pulmonary vascularity normal. Mildly prominent interstitial pattern both lungs may represent edema. No airspace infiltrates or pleural effusions. Prior median sternotomy and CABG. Thoracic spinal stabilization rods                Lab Results     Chemistry/lipid CBC Cardiac Enzymes/BNP/TSH/INR   Recent Labs   Lab Test 06/21/21  0522 04/09/18  0951 05/23/14  0745   CHOL 116   < > 134   HDL 38*   < > 34*   LDL 49   < > 48   TRIG 145   < > 257*   CHOLHDLRATIO  --   --  3.9    < > = values in this interval not displayed.     Recent Labs   Lab Test 06/21/21  0522 05/18/20  1117 08/26/19  1055   LDL 49 60 85     Recent Labs   Lab Test 09/24/21  0451      POTASSIUM 4.2   CHLORIDE 105   CO2 24   *   BUN 23   CR 1.37*   GFRESTIMATED 49*   JUSTINO 9.1     Recent Labs   Lab Test 09/24/21  0451 09/23/21  0505 09/22/21 1957   CR 1.37* 1.42* 1.39*     Recent Labs   Lab Test 09/23/21  0505 06/20/21  0707 01/09/20  2313   A1C 7.6* 9.0* 14.1*          Recent Labs   Lab Test 09/24/21 0452   WBC 3.5*   HGB 10.1*   HCT 31.3*   MCV 93   *     Recent Labs   Lab Test 09/24/21  0452 09/23/21  0505 09/22/21 1957   HGB 10.1* 9.3* 9.9*    Recent Labs   Lab Test 09/22/21 1957   TROPONINI 0.06     Recent Labs   Lab Test 09/22/21 1957 06/20/21  0707 10/08/18  0812 10/04/18  0839 10/03/18  0905 02/09/18  0934 05/25/17  1124 05/11/17  1238   BNP 2,340*  --   --   --   --   --   --   --    NTBNPI  --  7,364* 5,223* 21,539*   < >  --   --   --    NTBNP  --   --   --   --   --  22,172* 13,210* 16,812*    < > = values in this interval not displayed.     Recent Labs   Lab Test 10/03/18  0905   TSH 1.11     Recent Labs   Lab Test 04/16/21  1610 02/16/21  1014 01/08/21  1250   INR 1.05 3.6* 3.8*

## 2021-09-24 NOTE — PLAN OF CARE
Pt eating, drinking, voiding and has active bowel sounds.    Problem: Gas Exchange Impaired  Goal: Optimal Gas Exchange  Outcome: Improving  Intervention: Optimize Oxygenation and Ventilation  Recent Flowsheet Documentation  Taken 9/24/2021 1200 by Kate Mckinley RN  Head of Bed (HOB) Positioning: HOB at 30 degrees  Taken 9/24/2021 0830 by Kate Mckinley RN  Head of Bed ([facility]) Positioning: HOB at 30 degrees   Pt was weaned off his oxygen and he has maintained his O2 levels in the lower 90's with no sob.    Continue to monitor VS, labs, pain level, skin integrity, activity tolerance and respiratory status.

## 2021-09-24 NOTE — PROGRESS NOTES
[facility] MEDICINE PROGRESS NOTE      Identification/Summary: Tad Manning is a 80 year old male with a past medical history of chronic systolic heart failure, Type 2 Diabetes Mellitus, hypertension, chronic kidney disease, chronic lymphocytic leukemia, atrial fibrillation, hyperlipidemia, GERD, and right BKA who was admitted on 9/22/2021 for shortness of breath.      Assessment and Plan:  Acute CHF exacerbation with most recent EF of 20-25%.  Patient reports he feels like he is doing a lot better today.  Last [facility] was June 2021. Patient has been on 2L of oxygen since arrival and yesterday started IV furosemide 20 mg IV every 12 hours as well as spironolactone 12.5 mg daily. He diuresed 2,800 mL yesterday (9/23) and 1,000 mL so far today. Plan: Continue sacubitril-valsartan (entresto), digoxin, metoprolol succinate, IV furosemide transition to p.o. furosemide today by cardiology, spironolactone initiated yesterday by cardiology, and supplemental oxygen now able to be weaned.      Type 2 Diabetes Mellitis. Continue Glargine and Novolog with sliding scale with meals. Continue to hold Metformin.      Stage 3 Chronic Kidney Disease. Yesterday (9/23) patient's creatinine level was 1.42 with a GFR estimate of 47. Today's creatinine level is 1.39 with a GFR estimate of 49. Plan: Continue to monitor closely.      CLL diagnosed about 15 years ago according to patient. Patient is on Venetoclax.     Atrial fibrillation on apixaban (eliquis).      Hyperlipidemia on atorvastatin.      GERD well controlled on pantoprazole.    On Trazodone for sleep.    Diet: Consistent Carb 75 grams CHO per Meal Diet  DVT Prophylaxis:  Patient is on Apixaban (Eliquis)  Code Status: Special Code    Disposition:   Anticipated possible discharge in a couple days days to home (assisted living) once patient's symptoms are under control and he is responding well to treatment.    Liset Winter PA-C    Patient seen and  "examined  Patient discussed with physician assistant student  Agree with her assessment and plan as outlined above    Sandip Andre MD  [facility]  Phone: #[phone]    Interval History/Subjective:  Tad \"Hima\" Nigel is a 80 year old male who began feeling increased shortness of breath on 9/18/21 that worsened over the last couple days. He noticed he needed to rest more while walking in order to catch his breath and was having difficulty sleeping at night due to the increased shortness of breath. Patient felt he'd been drinking fluids but urinating less than usual. He said he has had a previous episode similar to this, due to dehydration. He is not experiencing any chest pain, nausea, or lightheadedness.     Physical Exam/Objective:  Temp:  [97.2  F (36.2  C)-98.2  F (36.8  C)] 98.2  F (36.8  C)  Pulse:  [68-81] 71  Resp:  [14-24] 20  BP: (131-164)/(61-79) 156/72  SpO2:  [91 %-95 %] 94 %  Body mass index is 24.34 kg/m .    GENERAL:  Alert, appears comfortable, in no acute distress, appears stated age   HEAD:  Normocephalic, without obvious abnormality, atraumatic   LUNGS:   Clear to auscultation bilaterally, no rales, rhonchi, or wheezing, symmetric chest rise on inhalation, respirations unlabored   HEART:  Regular rate and rhythm, S1 and S2 normal, no murmur, rub, or gallop    ABDOMEN:   Soft, non-tender, no masses, no organomegaly, no rebound or guarding   EXTREMITIES: Right BKA and left toe amputation. Could not view left toes due to bandages present. No cyanosis or edema    SKIN: Dry to touch, no exanthems in the visualized areas. Erythematous, dry patches present on right knee - likely due to prosthesis.    NEURO: Alert, oriented x3, moves all four extremities freely   PSYCH: Cooperative, behavior is appropriate      Data reviewed today: I personally reviewed all new medications, labs, imaging/diagnostics reports over the past 24 hours. " Pertinent findings include:    Labs:  Most Recent 3 CBC's:Recent Labs   Lab Test 09/24/21  0452 09/23/21  0505 09/22/21 1957   WBC 3.5* 3.0* 4.2   HGB 10.1* 9.3* 9.9*   MCV 93 94 95   * 102* 145*     Most Recent 3 Creatinines:Recent Labs   Lab Test 09/24/21  0451 09/23/21  0505 09/22/21 1957   CR 1.37* 1.42* 1.39*     Most Recent 6 glucoses:Recent Labs   Lab Test 09/24/21  0612 09/24/21  0451 09/23/21  1933 09/23/21  1736 09/23/21  1625 09/23/21  0654   * 146* 156* 237* 263* 192*       Medications:   Personally Reviewed.  Medications     - MEDICATION INSTRUCTIONS -         apixaban ANTICOAGULANT  5 mg Oral BID     aspirin  81 mg Oral Daily     atorvastatin  20 mg Oral Daily     citalopram  20 mg Oral QAM     digoxin  125 mcg Oral Daily     furosemide  20 mg Intravenous Q12H     insulin aspart  12 Units Subcutaneous QAM AC     insulin aspart  12 Units Subcutaneous Daily with lunch     insulin aspart  14 Units Subcutaneous Daily with supper     insulin aspart  1-7 Units Subcutaneous TID AC     insulin glargine  30 Units Subcutaneous At Bedtime     metoprolol succinate ER  100 mg Oral Daily     pantoprazole  40 mg Oral Daily     sacubitril-valsartan  1 tablet Oral BID     sodium chloride (PF)  3 mL Intracatheter Q8H     spironolactone  12.5 mg Oral Daily     traZODone  50 mg Oral At Bedtime     venetoclax  200 mg Oral Daily with supper     Vitamin D3  1,000 Units Oral Daily

## 2021-09-24 NOTE — CONSULTS
Hima is already an established [facility] patient and will f/u with established providers at the [place] on or before 9/30/21.   Rossy Castro RN BSN, CHFN

## 2021-09-24 NOTE — PLAN OF CARE
Problem: Risk for Delirium  Goal: Optimal Coping  Outcome: Change based on patient need/priority    Pt on alarms. Call light within reach.    Problem: Adult Inpatient Plan of Care  Goal: Plan of Care Review  Outcome: Change based on patient need/priority    Pt rating chronic back pain 3/10 this shift, pt repositioned at this time. Denies need for prn pain medications. Pt remains on 2L O2 via NC, sats 95%.  this AM. Mg 2.0 and K 4.2, both rechecks 9/25 AM. Remains on IV lasix. 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

## 2021-09-24 NOTE — PLAN OF CARE
Problem: Adult Inpatient Plan of Care  Goal: Plan of Care Review  Outcome: Improving  Goal: Optimal Comfort and Wellbeing  Outcome: Improving   Pt understands plan of care but needs reinforcement d/t forgetfullness. Pt able to verbalize needs. Bed alarm on for safety. Room door open. Hourly safety checks done. Pt SR with 1st degree AVB and BBB on tele.

## 2021-09-25 ENCOUNTER — APPOINTMENT (OUTPATIENT)
Dept: OCCUPATIONAL THERAPY | Facility: CLINIC | Age: 81
DRG: 291 | End: 2021-09-25
Attending: FAMILY MEDICINE
Payer: COMMERCIAL

## 2021-09-25 ENCOUNTER — APPOINTMENT (OUTPATIENT)
Dept: PHYSICAL THERAPY | Facility: CLINIC | Age: 81
DRG: 291 | End: 2021-09-25
Attending: FAMILY MEDICINE
Payer: COMMERCIAL

## 2021-09-25 VITALS
TEMPERATURE: 97.8 F | WEIGHT: 180.3 LBS | OXYGEN SATURATION: 93 % | HEIGHT: 72 IN | HEART RATE: 71 BPM | SYSTOLIC BLOOD PRESSURE: 148 MMHG | DIASTOLIC BLOOD PRESSURE: 80 MMHG | BODY MASS INDEX: 24.42 KG/M2 | RESPIRATION RATE: 16 BRPM

## 2021-09-25 LAB
ANION GAP SERPL CALCULATED.3IONS-SCNC: 8 MMOL/L (ref 5–18)
BUN SERPL-MCNC: 26 MG/DL (ref 8–28)
CALCIUM SERPL-MCNC: 9 MG/DL (ref 8.5–10.5)
CHLORIDE BLD-SCNC: 105 MMOL/L (ref 98–107)
CO2 SERPL-SCNC: 23 MMOL/L (ref 22–31)
CREAT SERPL-MCNC: 1.4 MG/DL (ref 0.7–1.3)
ERYTHROCYTE [DISTWIDTH] IN BLOOD BY AUTOMATED COUNT: 16.9 % (ref 10–15)
GFR SERPL CREATININE-BSD FRML MDRD: 48 ML/MIN/1.73M2
GLUCOSE BLD-MCNC: 190 MG/DL (ref 70–125)
GLUCOSE BLDC GLUCOMTR-MCNC: 195 MG/DL (ref 70–99)
GLUCOSE BLDC GLUCOMTR-MCNC: 248 MG/DL (ref 70–99)
HCT VFR BLD AUTO: 32.2 % (ref 40–53)
HGB BLD-MCNC: 10.4 G/DL (ref 13.3–17.7)
MAGNESIUM SERPL-MCNC: 1.8 MG/DL (ref 1.8–2.6)
MCH RBC QN AUTO: 30.6 PG (ref 26.5–33)
MCHC RBC AUTO-ENTMCNC: 32.3 G/DL (ref 31.5–36.5)
MCV RBC AUTO: 95 FL (ref 78–100)
PLATELET # BLD AUTO: 99 10E3/UL (ref 150–450)
POTASSIUM BLD-SCNC: 3.9 MMOL/L (ref 3.5–5)
RBC # BLD AUTO: 3.4 10E6/UL (ref 4.4–5.9)
SODIUM SERPL-SCNC: 136 MMOL/L (ref 136–145)
WBC # BLD AUTO: 3 10E3/UL (ref 4–11)

## 2021-09-25 PROCEDURE — 97166 OT EVAL MOD COMPLEX 45 MIN: CPT | Mod: GO

## 2021-09-25 PROCEDURE — 85027 COMPLETE CBC AUTOMATED: CPT | Performed by: FAMILY MEDICINE

## 2021-09-25 PROCEDURE — 80048 BASIC METABOLIC PNL TOTAL CA: CPT | Performed by: FAMILY MEDICINE

## 2021-09-25 PROCEDURE — 97535 SELF CARE MNGMENT TRAINING: CPT | Mod: GO

## 2021-09-25 PROCEDURE — 250N000013 HC RX MED GY IP 250 OP 250 PS 637: Performed by: INTERNAL MEDICINE

## 2021-09-25 PROCEDURE — 83735 ASSAY OF MAGNESIUM: CPT | Performed by: FAMILY MEDICINE

## 2021-09-25 PROCEDURE — 250N000013 HC RX MED GY IP 250 OP 250 PS 637: Performed by: FAMILY MEDICINE

## 2021-09-25 PROCEDURE — 99239 HOSP IP/OBS DSCHRG MGMT >30: CPT | Performed by: FAMILY MEDICINE

## 2021-09-25 PROCEDURE — 36415 COLL VENOUS BLD VENIPUNCTURE: CPT | Performed by: FAMILY MEDICINE

## 2021-09-25 PROCEDURE — 97161 PT EVAL LOW COMPLEX 20 MIN: CPT | Mod: GP

## 2021-09-25 PROCEDURE — 99233 SBSQ HOSP IP/OBS HIGH 50: CPT | Performed by: INTERNAL MEDICINE

## 2021-09-25 RX ORDER — POLYETHYLENE GLYCOL 3350 17 G/17G
17 POWDER, FOR SOLUTION ORAL DAILY
Status: DISCONTINUED | OUTPATIENT
Start: 2021-09-25 | End: 2021-09-25 | Stop reason: HOSPADM

## 2021-09-25 RX ORDER — SPIRONOLACTONE 25 MG/1
12.5 TABLET ORAL DAILY
Qty: 15 TABLET | Refills: 0 | Status: SHIPPED | OUTPATIENT
Start: 2021-09-26 | End: 2021-12-03

## 2021-09-25 RX ORDER — FUROSEMIDE 20 MG
20 TABLET ORAL DAILY
Status: DISCONTINUED | OUTPATIENT
Start: 2021-09-26 | End: 2021-09-25 | Stop reason: HOSPADM

## 2021-09-25 RX ORDER — POLYETHYLENE GLYCOL 3350 17 G/17G
17 POWDER, FOR SOLUTION ORAL DAILY PRN
Qty: 510 G | Refills: 0 | Status: SHIPPED | OUTPATIENT
Start: 2021-09-25

## 2021-09-25 RX ORDER — FUROSEMIDE 20 MG
20 TABLET ORAL DAILY
Qty: 30 TABLET | Refills: 0 | Status: SHIPPED | OUTPATIENT
Start: 2021-09-26 | End: 2021-10-21

## 2021-09-25 RX ORDER — DOXYCYCLINE 100 MG/1
100 CAPSULE ORAL 2 TIMES DAILY
Qty: 14 CAPSULE | Refills: 0 | Status: SHIPPED | OUTPATIENT
Start: 2021-09-25 | End: 2021-10-02

## 2021-09-25 RX ORDER — DOXYCYCLINE HYCLATE 50 MG/1
100 CAPSULE ORAL 2 TIMES DAILY
Status: DISCONTINUED | OUTPATIENT
Start: 2021-09-25 | End: 2021-09-25 | Stop reason: HOSPADM

## 2021-09-25 RX ADMIN — SACUBITRIL AND VALSARTAN 1 TABLET: 97; 103 TABLET, FILM COATED ORAL at 08:45

## 2021-09-25 RX ADMIN — ATORVASTATIN CALCIUM 20 MG: 10 TABLET, FILM COATED ORAL at 08:45

## 2021-09-25 RX ADMIN — DOXYCYCLINE HYCLATE 100 MG: 50 CAPSULE ORAL at 09:31

## 2021-09-25 RX ADMIN — INSULIN ASPART 12 UNITS: 100 INJECTION, SOLUTION INTRAVENOUS; SUBCUTANEOUS at 08:41

## 2021-09-25 RX ADMIN — FUROSEMIDE 40 MG: 40 TABLET ORAL at 08:45

## 2021-09-25 RX ADMIN — INSULIN ASPART 3 UNITS: 100 INJECTION, SOLUTION INTRAVENOUS; SUBCUTANEOUS at 11:38

## 2021-09-25 RX ADMIN — PANTOPRAZOLE SODIUM 40 MG: 20 TABLET, DELAYED RELEASE ORAL at 08:44

## 2021-09-25 RX ADMIN — APIXABAN 5 MG: 5 TABLET, FILM COATED ORAL at 08:44

## 2021-09-25 RX ADMIN — ASPIRIN 81 MG: 81 TABLET, CHEWABLE ORAL at 08:44

## 2021-09-25 RX ADMIN — CITALOPRAM HYDROBROMIDE 20 MG: 20 TABLET ORAL at 06:42

## 2021-09-25 RX ADMIN — DIGOXIN 125 MCG: 0.12 TABLET ORAL at 08:44

## 2021-09-25 RX ADMIN — METOPROLOL SUCCINATE 100 MG: 100 TABLET, EXTENDED RELEASE ORAL at 08:45

## 2021-09-25 RX ADMIN — POLYETHYLENE GLYCOL 3350 17 G: 17 POWDER, FOR SOLUTION ORAL at 09:31

## 2021-09-25 RX ADMIN — SPIRONOLACTONE 12.5 MG: 25 TABLET ORAL at 08:46

## 2021-09-25 RX ADMIN — INSULIN ASPART 2 UNITS: 100 INJECTION, SOLUTION INTRAVENOUS; SUBCUTANEOUS at 06:43

## 2021-09-25 RX ADMIN — Medication 1000 UNITS: at 08:46

## 2021-09-25 ASSESSMENT — MIFFLIN-ST. JEOR: SCORE: 1575.84

## 2021-09-25 NOTE — PROGRESS NOTES
Care Management Discharge Note    Discharge Date: 09/25/2021       Discharge Disposition:  Return to [facility]       Discharge Transportation: Family to transport        Additional Information:  CM reviewed Pt with RN as needed. Currently Pt will only have a few changes in medications and has discharge orders in. CM called [facility] and left messages requesting return calls from Liset the RNDebby with admissions, RN station, and the Aide station.     CM had a call back at 9:50AM from [facility] with the longterm. Marisela stated that they are able to accept the Pt and do have RNs on call. PRAMOD stated that there were not any concerns about the Pt returning to the longterm with med changes. Mariesla stated No and that she would reach out the the RN on call. PRAMOD had asked about RNs doing assessment on the weekends for returns as this [place] part of the [place]. Marisela stated that she had no issue with the Pt returning and will update the RN on call. Marisela confirms that family will transport the Pt back as needed.         DEMETRIUS Russell

## 2021-09-25 NOTE — PROGRESS NOTES
Cardiology Progress Note    Assessment    1.  Heart failure with reduced ejection fraction.  History of coronary artery disease with reduced ejection fraction of 20 to 25%.  Records from [facility] have been reviewed.  He is followed closely in the heart failure clinic at the [facility] and has an upcoming appointment.  I did send a note to his primary cardiologist yesterday.  Per correspondence with the clinic he has an appointment September 30.  Entresto increased yesterday 97/103.  He remains on metoprolol  mg daily.  Digoxin 0.125 mg daily.  Spironolactone has been added 12.5 mg daily.  Furosemide change to oral yesterday 40 mg daily.  He was only on 10 mg of furosemide at home.  Net urine output this admission of 3.7 L.  Weight 81.8 kg today.  Compares to 81.4 kg yesterday and 86.7 kg on September 23.  Etiology of exacerbation of acute heart failure not fully certain.  He reports compliance with medications and diet.  He has recently had his furosemide dose adjusted at the clinic.  Lab results today are stable with potassium of 3.9, creatinine of 1.4.  Most recent echocardiogram is from June 2021.  Ejection fraction reported at 20 to 25%.  Recommend follow-up echocardiogram with up titration of medications to reevaluate ejection fraction.  This could be completed with his primary cardiologist who he scheduled to see next week.    2.  Ischemic cardiomyopathy.  Recent coronary angiogram from a few months ago at [facility] reviewed.  Medical management was pursued at that time.  Patient reports that he has follow-up with his allergist at the [facility] next week.  I have sent a my chart note letting her know that the patient is in the hospital.  Patient indicates that there was some discussion about consideration of defibrillator.     3.  Anemia.  Defer to primary care.  4.  History of CLL.  5.  History by report of paroxysmal atrial fibrillation.  He has been on  apixaban.  Chads  2 vascular score of 6 on apixaban.  He is also on digoxin.  Recent digoxin level 0.8.  6.  Hyperlipidemia on atorvastatin.  LDL cholesterol 49 June 21, 2021.  7.  Hypertension.  Blood pressure still trending elevated in the 140s to 150s.  As noted Entresto dose increased yesterday and spironolactone added.  This no adjustments in medications upon follow-up with the heart failure clinic.  Active Problems:    Hypoxia    Acute on chronic congestive heart failure, unspecified heart failure type (H)      Plan:  1.  Continue with current cardiovascular medication regimen. Entresto increased this admission and spironolactone added.  2.  Close follow-up in CHF clinic.  Patient has an appointment on September 30 at [facility] where he typically follows.  3.  Will need close monitoring of electrolytes.  4.  Consideration for CRT based on follow-up with his primary cardiologist as previously outlined.  5.  Consider SGLT2 inhibitors.  Will defer this decision to his primary cardiologist.  Subjective:   Tad Manning is seen in follow-up.  He reports feeling well.  He states that he slept well last night.  He ambulated yesterday and I requested that he ambulate today.  States that he was able to sleep flat.  There is no chest pain or significant complaints of shortness of breath.    Objective:   Vital signs in last 24 hours:  VITALS: BP (!) 157/75 (BP Location: Right arm)   Pulse 68   Temp 97.4  F (36.3  C) (Oral)   Resp 18   Ht 1.829 m (6')   Wt 81.8 kg (180 lb 4.8 oz)   SpO2 92%   BMI 24.45 kg/m    BMI: Body mass index is 24.45 kg/m .  Wt Readings from Last 3 Encounters:   09/25/21 81.8 kg (180 lb 4.8 oz)   08/26/21 82.7 kg (182 lb 4.8 oz)   07/30/21 79.8 kg (176 lb)       Intake/Output Summary (Last 24 hours) at 9/25/2021 0656  Last data filed at 9/25/2021 0423  Gross per 24 hour   Intake 1080 ml   Output 2750 ml   Net -1670 ml       Physical Exam sitting comfortably in bed.  Answers  questions appropriately.   Neck: No JVD suggested on today's examination.   Lungs: clear to auscultation, no rales rhonchi or wheeze detected.   COR: Regular rhythm, distant heart tones, soft systolic murmur.  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  Some redness to the skin above the prosthesis.  Neosporin in place.    Cardiographics:    ECG: Sinus rhythm, LVH, T waves a little flatter in the lateral leads when compared to the prior EKG. by 2021.  Echocardiogram:  TTE 6/20/21  1. Left ventricular systolic function is severely reduced. The visual ejection  fraction is estimated at 20-25%.  2. The right ventricle is normal in structure, function and size.  3. The left atrium is moderately dilated.  4. The aortic valve is trileaflet with aortic valve sclerosis.  5. There is mild (1+) mitral regurgitation.    [facility] 6/19/21    1.  Severe multivessel CAD s/p CABG.                LM has severe stenosis.                LAD has sever proximal stenosis and midLAD is occluded. Distal LAD fills via patent LIMA.                LCx is occluded.                 RCA has moderate diffuse disease.  2.  LIMA to LAD is patent.  Mid to distal LAD stents are patent.  3.  SVG to OM is patent.  4.  SVG to Diagonal is patent with a 70% stenosis and supplies diagonal branches are small and diffusely diseased     Telemetry: Sinus rhythm, premature atrial complexes, some trend towards bradycardia while sleeping.    Imaging:   Narrative & Impression   EXAM: XR CHEST PORT 1 VIEW  LOCATION: [facility]  DATE/TIME: 9/22/2021 6:21 PM     INDICATION: sob, chf  COMPARISON: 06/20/2021                                                                       IMPRESSION: Cardiomegaly. Pulmonary vascularity normal. Mildly prominent interstitial pattern both lungs may represent edema. No airspace  infiltrates or pleural effusions. Prior median sternotomy and CABG. Thoracic spinal stabilization rods             Lab Results    Chemistry/lipid CBC Cardiac Enzymes/BNP/TSH/INR   Recent Labs   Lab Test 06/21/21  0522 04/09/18  0951 05/23/14  0745   CHOL 116   < > 134   HDL 38*   < > 34*   LDL 49   < > 48   TRIG 145   < > 257*   CHOLHDLRATIO  --   --  3.9    < > = values in this interval not displayed.     Recent Labs   Lab Test 06/21/21  0522 05/18/20  1117 08/26/19  1055   LDL 49 60 85     Recent Labs   Lab Test 09/24/21  2205 09/24/21  0612 09/24/21  0451   NA  --   --  138   POTASSIUM  --   --  4.2   CHLORIDE  --   --  105   CO2  --   --  24   *   < > 146*   BUN  --   --  23   CR  --   --  1.37*   GFRESTIMATED  --   --  49*   JUSTINO  --   --  9.1    < > = values in this interval not displayed.     Recent Labs   Lab Test 09/24/21  0451 09/23/21  0505 09/22/21 1957   CR 1.37* 1.42* 1.39*     Recent Labs   Lab Test 09/23/21  0505 06/20/21  0707 01/09/20  2313   A1C 7.6* 9.0* 14.1*          Recent Labs   Lab Test 09/25/21  0555   WBC 3.0*   HGB 10.4*   HCT 32.2*   MCV 95   PLT 99*     Recent Labs   Lab Test 09/25/21  0555 09/24/21  0452 09/23/21  0505   HGB 10.4* 10.1* 9.3*    Recent Labs   Lab Test 09/22/21 1957   TROPONINI 0.06     Recent Labs   Lab Test 09/22/21 1957 06/20/21  0707 10/08/18  0812 10/04/18  0839 10/03/18  0905 02/09/18  0934 05/25/17  1124 05/11/17  1238   BNP 2,340*  --   --   --   --   --   --   --    NTBNPI  --  7,364* 5,223* 21,539*   < >  --   --   --    NTBNP  --   --   --   --   --  22,172* 13,210* 16,812*    < > = values in this interval not displayed.     Recent Labs   Lab Test 10/03/18  0905   TSH 1.11     Recent Labs   Lab Test 04/16/21  1610 02/16/21  1014 01/08/21  1250   INR 1.05 3.6* 3.8*

## 2021-09-25 NOTE — PLAN OF CARE
Problem: Adult Inpatient Plan of Care  Goal: Absence of Hospital-Acquired Illness or Injury  Outcome: Improving    Problem: Gas Exchange Impaired  Goal: Optimal Gas Exchange  Outcome: Improving    Problem: Adult Inpatient Plan of Care  Goal: Absence of Hospital-Acquired Illness or Injury    D/o to time. Denies pain. Patient is now on room air. Remains dyspneic on exertion. Incontinent, male purwick in place. No BM overnight. Mepi in place to coccyx. Up W/ A1, GB/W. R BKA, prosthesis at bedside. PIV SL. Tele: SR w/ 1st degree AV block and BBB. [facility]charge back to [facility] is pending.

## 2021-09-25 NOTE — PLAN OF CARE
Patient resting in bed the entire shift. He is able to turn and reposition himself independently. Appetite good on a Diabetic diet. Blood Sugar = 160. Telemetry shows NSR with 1 st degree AV Block.Patient states he is feeling better and hopes to go home tomorrow. Will continue to monitor.

## 2021-09-25 NOTE — PROGRESS NOTES
Occupational Therapy       09/25/21 0930   Quick Adds   Type of Visit Initial Occupational Therapy Evaluation   Living Environment   People in home other (see comments)  ([facility] staff)   Current Living Arrangements assisted living   Home Accessibility no concerns   Living Environment Comments RTS with grab bars, walk in shower with grab bars   Self-Care   Equipment Currently Used at Home walker, rolling;wheelchair, manual;raised toilet seat;grab bar, toilet;grab bar, tub/shower   Activity/Exercise/Self-Care Comment ind with dressing, toileting, functional mob; A with bathing   Instrumental Activities of Daily Living (IADL)   IADL Comments assist with med management, meal prep   Disability/Function   Hearing Difficulty or Deaf no   Wear Glasses or Blind no   Concentrating, Remembering or Making Decisions Difficulty no   Difficulty Communicating no   Difficulty Eating/Swallowing no   Walking or Climbing Stairs Difficulty yes   Mobility Management walker, w/c for mobility   Dressing/Bathing bathing difficulty, assistance 1 person   Doing Errands Independently Difficulty (such as shopping) no   General Information   Onset of Illness/Injury or Date of Surgery 09/22/21   Referring Physician Sandip Andre MD   Patient/Family Therapy Goal Statement (OT) none stated- wants to d/c today   Left Lower Extremity (Weight-bearing Status) full weight-bearing (FWB)  (BKA- has prosthesis)   Right Lower Extremity (Weight-bearing Status) full weight-bearing (FWB)   Cognitive Status Examination   Orientation Status orientation to person, place and time   Affect/Mental Status (Cognitive) WFL   Range of Motion Comprehensive   General Range of Motion bilateral upper extremity ROM WFL   Strength Comprehensive (MMT)   General Manual Muscle Testing (MMT) Assessment no strength deficits identified  (UE WFL)   Transfers   Transfers sit-stand transfer   Transfer Comments Sit to stand with SBA and four wheeled walker   Sit-Stand Transfer    Sit-Stand [place] (Transfers) set up;supervision   Assistive Device (Sit-Stand Transfers) walker, 4-wheeled   Clinical Impression   Criteria for Skilled Therapeutic Interventions Met (OT) yes   OT Diagnosis decreased ADL independence   OT Problem List-Impairments impacting ADL mobility;strength;activity tolerance impaired   Assessment of Occupational Performance 1-3 Performance Deficits   Identified Performance Deficits transfers, bed mob   Planned Therapy Interventions (OT) ADL retraining;bed mobility training;transfer training   Clinical Decision Making Complexity (OT) moderate complexity   Therapy Frequency (OT) Daily   Risk & Benefits of therapy have been explained patient   OT Discharge Planning    OT Discharge Recommendation (DC Rec) Home with assist  (Return to [facility] with prior level of assist)   Total Evaluation Time (Minutes)   Total Evaluation Time (Minutes) 5     Marlen Srivastava OTR/L 9/25/2021

## 2021-09-25 NOTE — PLAN OF CARE
Occupational Therapy Discharge Summary    Reason for therapy discharge:    Discharged to [facility]    Progress towards therapy goal(s). See goals on Care Plan in [facility] electronic health record for goal details.  Pt progressing well with goals    Therapy recommendation(s):    Appropriate for discharge back to [facility] with prior level of support..    Marlen Srivastava, OTR/L 9/25/2021

## 2021-09-25 NOTE — PLAN OF CARE
Reviewed discharge instructions with patient and his niece, Verónica. [facility] will be ordering all new prescriptions. Paper copies of prescriptions were sent with niece. Faxed orders also sent to [facility]. Patient will follow up with Cardiologist and Podiatrist. All questions answered and patient was happy to be going home.

## 2021-09-26 DIAGNOSIS — Z71.89 OTHER SPECIFIED COUNSELING: ICD-10-CM

## 2021-09-27 ENCOUNTER — PATIENT OUTREACH (OUTPATIENT)
Dept: CARE COORDINATION | Facility: CLINIC | Age: 81
End: 2021-09-27

## 2021-09-27 NOTE — PROGRESS NOTES
Clinic Care Coordination Contact  [facility]/Voicemail       Clinical Data: Care Coordinator Outreach    Outreach attempted x 1.  Left message on patient's voicemail with call back information and requested return call.    Plan:  Care Coordinator will try to reach patient again in 1-2 business days.    Eleonora Brunner, [facility]  [phone]  [facility]

## 2021-09-27 NOTE — PROGRESS NOTES
Clinic Care Coordination Contact    Background: Care Coordination referral placed from [facility] discharge report for reason of patient meeting criteria for a TCM outreach call by [facility] team.    Assessment: Upon chart review, [facility] Team member will cancel/close the referral for TCM outreach due to reason below:     Patient has discharged to another healthcare facility, skilled nursing facility or group home.       Plan: Care Coordination referral for TCM outreach canceled.    JENNIFER Hutchins  [phone]  [facility]

## 2021-09-28 NOTE — PROGRESS NOTES
Clinic Care Coordination Contact  [facility]: Post-Discharge Note  SITUATION                                                      Admission:    Admission Date: 09/22/21   Reason for Admission: Hypoxia  Discharge:   Discharge Date: 09/25/21  Discharge Diagnosis: Hypoxia    BACKGROUND                                                      80-year-old male with a history of congestive heart failure, atrial fibrillation, coronary artery disease, status post right BKA, CLL on chemotherapy presented to the emergency room yesterday for increasing shortness of breath.  Shortness of breath symptoms again 4 days prior and was reported mild over the weekend but progressed over the past 2 days.  He reported that he was having difficulty sleeping at night.  He states he was feeling anxious with increased shortness of breath.  The ER notes revealed mild lower extremity edema and recent weight gain.  He also noted that he has been drinking more fluids but urinating less frequently.  He felt as if he might be dehydrated.  He tells me that he tried to walk to a podiatrist appointment in his building but was more breathless.  He has a little bit of difficulty describing the recent history.  Denies chest pain, syncope or near syncope.  He tells me he has an upcoming appointment with his primary cardiologist and also tells me that there have been discussions about possible defibrillator.       ASSESSMENT      Enrollment  Primary Care Care Coordination Status: Not a Candidate    Discharge Assessment  How are you doing now that you are home?: Patient reports he is feeling well, no questions or concerns at this time.  How are your symptoms? (Red Flag symptoms escalate to triage hotline per guidelines): Improved  Do you feel your condition is stable enough to be safe at home until your provider visit?: Yes  Does the patient have their discharge instructions? : Yes  Does the patient have questions regarding their discharge  instructions? : No  Were you started on any new medications or were there changes to any of your previous medications? : Yes  Does the patient have all of their medications?: Yes  Do you have questions regarding any of your medications? : No  Discharge follow-up appointment scheduled within 14 calendar days? : Yes  Discharge Follow Up Appointment Date: 09/30/21  Discharge Follow Up Appointment Scheduled with?: Specialty Care Provider                  PLAN                                                      Outpatient Plan:      Future Appointments   Date Time Provider Department Center   9/30/2021 10:00 AM [facility]   9/30/2021 10:30 AM Carly Lomas PA-C [facility]   10/5/2021  9:40 AM Hans Dial DPM [id number]         For any urgent concerns, please contact our 24 hour nurse triage line: [phone] ([phone])         ZEE Paz  [phone]  [facility]

## 2021-09-29 DIAGNOSIS — I50.22 CHRONIC SYSTOLIC HEART FAILURE (H): Primary | ICD-10-CM

## 2021-09-29 NOTE — PROGRESS NOTES
In person appointment    HPI:   Mr. 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 discitis and multple admission for decompensated heart failure. Presents to clinic for CORE f/u.    He was admitted from 9/22-9/25. He was diuresed from 190 lbs to 179. His entresto was increased. Aldactone was added. His lasix was increased from 10 mg daily to 40 mg daily.     Recently moved to [facility] in [place]. He has some help taking his medications there.  He feels much better sincleaving the hospital. No SOB at rest.Walking from his apartment down to meals is about 1.5 blocks and he felt ALEXIS with this before coming to the hospital, now this makes him feel minimaly SOB. LE edema is there, but about as good as it gets. No abdominal edema. He denies orthopnea, PND. He gets some lightheadedness right when he stands. No palpitations or syncope. No chest pain.    He has a scale but hasn't been checking his weights at home.    We will review medication list with his assisted living.    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)      Chronic systolic heart failure (H) 6/6/2017     Confusion 6/20/2021     Coronary artery disease     triple bypass 1995     CRF (chronic renal  failure), stage 3 (moderate) 2017     Critical lower limb ischemia 1/10/2014     Diabetes mellitus (H)      Diabetes mellitus, type 2 (H) 2014     Diabetic polyneuropathy (H)      Discitis 2017     Epidural abscess 2017     Fall 2017     History of blood transfusion      HTN (hypertension) 2014     Hyperglycemia 2020     Hyperlipidaemia      Hyperlipidemia with target LDL less than 100 2014    Formatting of this note might be different from the original. Overview:  Diagnosis updated by automated process. Provider to review and confirm. Formatting of this note might be different from the original. Diagnosis updated by automated process. Provider to review and confirm.     Hypertension      Long term current use of anticoagulant therapy 2018     Non-healing ulcer of foot (H)     Right     Noninflammatory pericardial effusion      Osteomyelitis (H) 2014     Osteomyelitis of foot, right, acute (H) 2/15/2017     Osteomyelitis of left foot (H)      PVD (peripheral vascular disease) (H)      Recurrent falls 2021     Sebaceous cyst        FAMILY HISTORY:  Family History   Problem Relation Age of Onset     Cancer Mother         leukemia     Leukemia Mother        SOCIAL HISTORY:  Socioeconomic History     Marital status: Single   Tobacco Use     Smoking status: Former Smoker     Packs/day: 2.00     Years: 30.00     Pack years: 60.00     Types: Cigarettes     Last attempt to quit: 1995     Years since quittin.5     Smokeless tobacco: Never Used   Other Topics Concern     Parent/sibling w/ CABG, MI or angioplasty before 65F 55M? Not Asked   Social History Narrative    Lives independently with SO. 2017        CURRENT MEDICATIONS:  acetaminophen (TYLENOL) 325 MG tablet, Take 650 mg by mouth every 4 hours as needed for mild pain or fever  alcohol swab prep pads, Use to swab area of injection/jina as directed.  apixaban ANTICOAGULANT (ELIQUIS ANTICOAGULANT) 5 MG  tablet, Take 1 tablet (5 mg) by mouth 2 times daily  aspirin (ASA) 81 MG chewable tablet, Take 81 mg by mouth daily   atorvastatin (LIPITOR) 20 MG tablet, Take 1 tablet (20 mg) by mouth daily  Mahsa Protect (EUCERIN) external cream, Apply topically 2 times daily as needed for dry skin or other Apply as needed to intact stage 1 pressure area and/or irritated skin, rash or excoriation  bisacodyl (DULCOLAX) 10 MG suppository, Place 10 mg rectally daily as needed for constipation If no BM or small BM for 3 days/9 shifts  blood glucose (NO BRAND SPECIFIED) test strip, Use to test blood sugar 4 times daily or as directed.  blood glucose calibration (NO BRAND SPECIFIED) solution, 1 drop every 14 days Use 1 drop to calibrate blood glucose monitor every 14 days  blood glucose monitoring (NO BRAND SPECIFIED) meter device kit, Use to test blood sugar 4 times daily or as directed.  cholecalciferol 1000 UNITS TABS, Take 1,000 Units by mouth daily  citalopram (CELEXA) 20 MG tablet, Take 20 mg by mouth every morning   digoxin (LANOXIN) 125 MCG tablet, Take 1 tablet (125 mcg) by mouth daily  doxycycline hyclate (VIBRAMYCIN) 100 MG capsule, Take 1 capsule (100 mg) by mouth 2 times daily for 7 days  ENTRESTO 49-51 MG per tablet, TAKE 1.5 TABLETS BY MOUTH IN THE MORNING AND 1.5 TABLETS IN THE EVENING.  furosemide (LASIX) 20 MG tablet, Take 1 tablet (20 mg) by mouth daily  insulin glargine (LANTUS PEN) 100 UNIT/ML pen, Inject 30 Units Subcutaneous At Bedtime  insulin lispro (HUMALOG KWIKPEN) 100 UNIT/ML (1 unit dial) KWIKPEN, Inject 12 Units Subcutaneous 2 times daily (before meals) Breakfast and lunch  insulin lispro (HUMALOG KWIKPEN) 100 UNIT/ML (1 unit dial) KWIKPEN, Inject 14 Units Subcutaneous daily (with dinner)  loperamide (IMODIUM A-D) 2 MG tablet, Take 2-4 mg by mouth 4 times daily as needed for diarrhea Take 4 mg after 1st loose stool, then take 1 tablet as needed after each loose stool  magnesium hydroxide (MILK OF MAGNESIA)  "400 MG/5ML suspension, Take 30 mLs by mouth daily as needed for constipation or heartburn If no BM/small BM for 3 days/9 shifts  metFORMIN (GLUCOPHAGE-XR) 750 MG 24 hr tablet, Take 750 mg by mouth every morning  metoprolol succinate ER (TOPROL-XL) 100 MG 24 hr tablet, Take 1 tablet (100 mg) by mouth daily  neomycin-bacitracin-polymyxin (NEOSPORIN) 5-400-5000 ointment, Apply 1 each topically 3 times daily as needed (for minor abrasions)  nitroglycerin (NITROSTAT) 0.4 MG sublingual tablet, For chest pain place 1 tablet under the tongue every 5 minutes for 3 doses. If symptoms persist 5 minutes after 1st dose call 911.  nystatin (MYCOSTATIN) 484497 UNIT/GM external powder, Apply topically 2 times daily as needed (to affected area(s) as needed)  order for DME, Equipment to be ordered:  Scale- Qty 1  Commander Flex - Qty. 1  Pulse-Oximeter - Qty. 1  Use as directed  order for DME, Equipment being ordered: 2 shoe  pantoprazole (PROTONIX) 40 MG EC tablet, Take 40 mg by mouth daily  polyethylene glycol (MIRALAX) 17 GM/Dose powder, Take 17 g by mouth daily as needed  spironolactone (ALDACTONE) 25 MG tablet, Take 0.5 tablets (12.5 mg) by mouth daily  thin (NO BRAND SPECIFIED) lancets, 1 each 4 times daily Use with lanceting device.  traZODone (DESYREL) 50 MG tablet, Take 50 mg by mouth At Bedtime  ULTICARE SHORT 31G X 8 MM insulin pen needle,   venetoclax (VENCLEXTA) 100 MG tablet, Take 200 mg by mouth daily  guaiFENesin (ROBITUSSIN) 100 MG/5ML SYRP, Take 10 mLs by mouth every 4 hours as needed for cough For cold symptoms and non productive cough (Patient not taking: Reported on 9/30/2021)    No current facility-administered medications on file prior to visit.      ROS:   See HPI    EXAM:   /61 (BP Location: Right arm, Patient Position: Chair, Cuff Size: Adult Regular)   Pulse 69   Ht 1.785 m (5' 10.28\")   Wt 83.5 kg (184 lb)   SpO2 98%   BMI 26.19 kg/m       GENERAL: Appears comfortable, in no acute distress. "   HEENT: Eye symmetrical, no discharge or icterus bilaterally. Mucous membranes moist and without lesions.  CV: RRR, +S1S2, no murmur, rub, or gallop. JVP just above clavicle at 90 degrees.   RESPIRATORY: Respirations regular, even, and unlabored. Lungs CTA throughout.   GI: Soft and non distended with normoactive bowel sounds present in all quadrants. No tenderness, rebound, guarding. No hepatomegaly.   EXTREMITIES: AKA of the right leg. Left leg with moderate edema. Left leg is warm and well perfused.  NEUROLOGIC: Alert and interacting appropriately. No focal deficits.   MUSCULOSKELETAL: No joint swelling or tenderness.   SKIN: No jaundice. No rashes or lesions.     Labs, reviewed with patient in clinic today:  CBC RESULTS:  Lab Results   Component Value Date    WBC 3.0 (L) 09/25/2021    WBC 3.7 (L) 06/26/2021    RBC 3.40 (L) 09/25/2021    RBC 3.09 (L) 06/26/2021    HGB 10.4 (L) 09/25/2021    HGB 9.6 (L) 06/26/2021    HCT 32.2 (L) 09/25/2021    HCT 28.6 (L) 06/26/2021    MCV 95 09/25/2021    MCV 93 06/26/2021    MCH 30.6 09/25/2021    MCH 31.1 06/26/2021    MCHC 32.3 09/25/2021    MCHC 33.6 06/26/2021    RDW 16.9 (H) 09/25/2021    RDW 14.6 06/26/2021    PLT 99 (L) 09/25/2021     (L) 06/26/2021       CMP RESULTS:  Lab Results   Component Value Date     09/30/2021     06/28/2021    POTASSIUM 4.2 09/30/2021    POTASSIUM 4.1 06/28/2021    CHLORIDE 106 09/30/2021    CHLORIDE 109 06/28/2021    CO2 27 09/30/2021    CO2 24 06/28/2021    ANIONGAP 7 09/30/2021    ANIONGAP 4 06/28/2021     (H) 09/30/2021     (H) 06/28/2021    BUN 22 09/30/2021    BUN 43 (H) 06/28/2021    CR 1.47 (H) 09/30/2021    CR 1.33 (H) 06/28/2021    GFRESTIMATED 45 (L) 09/30/2021    GFRESTIMATED >60 07/08/2021    GFRESTIMATED 51 (L) 06/28/2021    GFRESTBLACK >60 07/08/2021    GFRESTBLACK 59 (L) 06/28/2021    JUSTINO 9.6 09/30/2021    JUSTINO 9.4 06/28/2021    BILITOTAL 0.8 09/23/2021    BILITOTAL 0.9 06/20/2021    ALBUMIN 3.0  (L) 09/23/2021    ALBUMIN 3.3 (L) 06/20/2021    ALKPHOS 84 09/23/2021    ALKPHOS 72 06/20/2021    ALT 12 09/23/2021    ALT 24 06/20/2021    AST 12 09/23/2021    AST 34 06/20/2021        INR RESULTS:  Lab Results   Component Value Date    INR 1.05 04/16/2021       Lab Results   Component Value Date    MAG 1.8 09/25/2021    MAG 2.0 06/23/2021     Lab Results   Component Value Date    NTBNPI 7,364 (H) 06/20/2021     Lab Results   Component Value Date    NTBNP 22,172 (H) 02/09/2018       Diagnostics:  TTE 6/20/21  1. Left ventricular systolic function is severely reduced. The visual ejection  fraction is estimated at 20-25%.  2. The right ventricle is normal in structure, function and size.  3. The left atrium is moderately dilated.  4. The aortic valve is trileaflet with aortic valve sclerosis.  5. There is mild (1+) mitral regurgitation.  ______________________________________________________________________________      TTE 10/5/18  The visual ejection fraction is estimated at 35-40%.  There is mild-moderate global hypokinesia of the left ventricle.  Mildly decreased right ventricular systolic function  In comparison with the previous study the LVEF has improved slightly.    Assessment and Plan:   Mr. 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 discitis and multple admissions for decompensated heart failure. Presents to clinic for CORE f/u.    Patient is recently moved into assisted living which is led to a lot more consistency in taking his medications. His EF has worsened on his most recent echo.  It is now 20 to 25%.  We will titrate his neurohormonal blockade.  Then we will recheck an echo.  If he remains below 35% we will need to discuss ICD.    He was recently hospitalized  for hypervolemia and his lasix was increased to 20 mg daily. It appear that his weight is up slightly from discharge, although difficult to tell d/t scale differences. We do not have reliable weights at this point from home.    # Chronic systolic heart failure/HFrEF secondary to ICM  (Ef 20-25%)  Stage C. NYHA Class IIIA  Fluid status: mild hypervolemia- continue lasix 20mg daily, increase entresto. If no improvement Monday, will increase lasix slightly  ACEi/ARB/ARNI: Entresto 49/51 BID - continue 1.5 of these tabs BID  BB: metoprolol  mg,  Aldosterone antagonist: defer  SCD prophylaxis: n/a EF now below 35%- will need to consider if not improving with CHF med titration  NSAID use: contraindicated  Sleep apnea evaluation: not discussed today  Remote monitoring: consider at future visits    # CAD s/p CABG  # PAD s/p iliac stenting and LE bypass  -no anginal symptoms, he is on asa and statin (defer high potency to cardiologist); he has stable claudication    # Atrial flutter  - he is on BB and digoxin and warfarin    # CKD stage 3a  - Recheck BMP in 1 week after increasing entresto    Plan:  - BMP in 1 week (recent lasix changes and now increased entresto)  - CORE clinic one month  - Dr. Leon in 2 months    Billing  - I managed 2+ stable chronic conditions  - I changed a prescription medication        Carly Browne PA-C  [facility]        CC  FABY LEON

## 2021-09-30 ENCOUNTER — LAB (OUTPATIENT)
Dept: LAB | Facility: CLINIC | Age: 81
End: 2021-09-30
Attending: PHYSICIAN ASSISTANT
Payer: COMMERCIAL

## 2021-09-30 ENCOUNTER — OFFICE VISIT (OUTPATIENT)
Dept: CARDIOLOGY | Facility: CLINIC | Age: 81
End: 2021-09-30
Attending: NURSE PRACTITIONER
Payer: COMMERCIAL

## 2021-09-30 ENCOUNTER — PATIENT OUTREACH (OUTPATIENT)
Dept: CARDIOLOGY | Facility: CLINIC | Age: 81
End: 2021-09-30

## 2021-09-30 VITALS
OXYGEN SATURATION: 98 % | DIASTOLIC BLOOD PRESSURE: 61 MMHG | HEIGHT: 70 IN | BODY MASS INDEX: 26.34 KG/M2 | HEART RATE: 69 BPM | SYSTOLIC BLOOD PRESSURE: 120 MMHG | WEIGHT: 184 LBS

## 2021-09-30 DIAGNOSIS — I50.22 CHRONIC SYSTOLIC HEART FAILURE (H): Primary | ICD-10-CM

## 2021-09-30 DIAGNOSIS — I50.22 CHRONIC SYSTOLIC HEART FAILURE (H): ICD-10-CM

## 2021-09-30 LAB
ANION GAP SERPL CALCULATED.3IONS-SCNC: 7 MMOL/L (ref 3–14)
BUN SERPL-MCNC: 22 MG/DL (ref 7–30)
CALCIUM SERPL-MCNC: 9.6 MG/DL (ref 8.5–10.1)
CHLORIDE BLD-SCNC: 106 MMOL/L (ref 94–109)
CO2 SERPL-SCNC: 27 MMOL/L (ref 20–32)
CREAT SERPL-MCNC: 1.47 MG/DL (ref 0.66–1.25)
GFR SERPL CREATININE-BSD FRML MDRD: 45 ML/MIN/1.73M2
GLUCOSE BLD-MCNC: 116 MG/DL (ref 70–99)
POTASSIUM BLD-SCNC: 4.2 MMOL/L (ref 3.4–5.3)
SODIUM SERPL-SCNC: 140 MMOL/L (ref 133–144)

## 2021-09-30 PROCEDURE — 80048 BASIC METABOLIC PNL TOTAL CA: CPT | Performed by: PATHOLOGY

## 2021-09-30 PROCEDURE — G0463 HOSPITAL OUTPT CLINIC VISIT: HCPCS

## 2021-09-30 PROCEDURE — 36415 COLL VENOUS BLD VENIPUNCTURE: CPT | Performed by: PATHOLOGY

## 2021-09-30 PROCEDURE — 99214 OFFICE O/P EST MOD 30 MIN: CPT | Performed by: PHYSICIAN ASSISTANT

## 2021-09-30 ASSESSMENT — PAIN SCALES - GENERAL: PAINLEVEL: NO PAIN (0)

## 2021-09-30 ASSESSMENT — MIFFLIN-ST. JEOR: SCORE: 1565.25

## 2021-09-30 NOTE — TELEPHONE ENCOUNTER
Called Debby to follow up on Hima's medications and reconcile meds. She confirms all of his heart medications currently listed are what he's taking. Specifically Lasix increased to 20 mg daily, started Spironolactone 12.5 mg daily.   She also notes they stopped Allopurinol and started 7 day course of Doxy for some mild cellulitis on toe.     Will update provider.

## 2021-09-30 NOTE — NURSING NOTE
Chief Complaint   Patient presents with     Follow Up     return CORE patient     Vitals were taken and medications reconciled.    ASHKAN Reyes  10:39 AM

## 2021-09-30 NOTE — LETTER
9/30/2021      RE: Tad Manning  [address]  [facility] [address]       Dear Colleague,    Thank you for the opportunity to participate in the care of your patient, Tad Manning, at the [facility] [place] at [facility]. Please see a copy of my visit note below.    In person appointment    HPI:   Mr. 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 discitis and multple admission for decompensated heart failure. Presents to clinic for CORE f/u.    He was admitted from 9/22-9/25. He was diuresed from 190 lbs to 179. His entresto was increased. Aldactone was added. His lasix was increased from 10 mg daily to 40 mg daily.     Recently moved to [facility] in [place]. He has some help taking his medications there.  He feels much better sincleaving the hospital. No SOB at rest.Walking from his apartment down to meals is about 1.5 blocks and he felt ALEXIS with this before coming to the hospital, now this makes him feel minimaly SOB. LE edema is there, but about as good as it gets. No abdominal edema. He denies orthopnea, PND. He gets some lightheadedness right when he stands. No palpitations or syncope. No chest pain.    He has a scale but hasn't been checking his weights at home.    We will review medication list with his assisted living.    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 Overview:  Diagnosis updated by automated process. Provider to review and confirm. Formatting of this note might be different from the original. Diagnosis updated by automated process. Provider to review and confirm.     Hypertension      Long term current use of anticoagulant therapy 2018     Non-healing ulcer of foot (H)     Right     Noninflammatory pericardial effusion      Osteomyelitis (H) 2014     Osteomyelitis of foot, right, acute (H) 2/15/2017     Osteomyelitis of left foot (H)      PVD (peripheral vascular disease) (H)      Recurrent falls 2021     Sebaceous cyst        FAMILY HISTORY:  Family History   Problem Relation Age of Onset     Cancer Mother         leukemia     Leukemia Mother        SOCIAL HISTORY:  Socioeconomic History     Marital status: Single   Tobacco Use     Smoking status: Former Smoker     Packs/day: 2.00     Years: 30.00     Pack years: 60.00     Types: Cigarettes     Last attempt to quit: 1995     Years since quittin.5     Smokeless tobacco: Never Used   Other Topics Concern     Parent/sibling w/ CABG, MI or angioplasty  before 65F 55M? Not Asked   Social History Narrative    Lives independently with SO. 2/9/2017        CURRENT MEDICATIONS:  acetaminophen (TYLENOL) 325 MG tablet, Take 650 mg by mouth every 4 hours as needed for mild pain or fever  alcohol swab prep pads, Use to swab area of injection/jian as directed.  apixaban ANTICOAGULANT (ELIQUIS ANTICOAGULANT) 5 MG tablet, Take 1 tablet (5 mg) by mouth 2 times daily  aspirin (ASA) 81 MG chewable tablet, Take 81 mg by mouth daily   atorvastatin (LIPITOR) 20 MG tablet, Take 1 tablet (20 mg) by mouth daily  Mahsa Protect (EUCERIN) external cream, Apply topically 2 times daily as needed for dry skin or other Apply as needed to intact stage 1 pressure area and/or irritated skin, rash or excoriation  bisacodyl (DULCOLAX) 10 MG suppository, Place 10 mg rectally daily as needed for constipation If no BM or small BM for 3 days/9 shifts  blood glucose (NO BRAND SPECIFIED) test strip, Use to test blood sugar 4 times daily or as directed.  blood glucose calibration (NO BRAND SPECIFIED) solution, 1 drop every 14 days Use 1 drop to calibrate blood glucose monitor every 14 days  blood glucose monitoring (NO BRAND SPECIFIED) meter device kit, Use to test blood sugar 4 times daily or as directed.  cholecalciferol 1000 UNITS TABS, Take 1,000 Units by mouth daily  citalopram (CELEXA) 20 MG tablet, Take 20 mg by mouth every morning   digoxin (LANOXIN) 125 MCG tablet, Take 1 tablet (125 mcg) by mouth daily  doxycycline hyclate (VIBRAMYCIN) 100 MG capsule, Take 1 capsule (100 mg) by mouth 2 times daily for 7 days  ENTRESTO 49-51 MG per tablet, TAKE 1.5 TABLETS BY MOUTH IN THE MORNING AND 1.5 TABLETS IN THE EVENING.  furosemide (LASIX) 20 MG tablet, Take 1 tablet (20 mg) by mouth daily  insulin glargine (LANTUS PEN) 100 UNIT/ML pen, Inject 30 Units Subcutaneous At Bedtime  insulin lispro (HUMALOG KWIKPEN) 100 UNIT/ML (1 unit dial) KWIKPEN, Inject 12 Units Subcutaneous 2 times daily (before meals)  Breakfast and lunch  insulin lispro (HUMALOG KWIKPEN) 100 UNIT/ML (1 unit dial) KWIKPEN, Inject 14 Units Subcutaneous daily (with dinner)  loperamide (IMODIUM A-D) 2 MG tablet, Take 2-4 mg by mouth 4 times daily as needed for diarrhea Take 4 mg after 1st loose stool, then take 1 tablet as needed after each loose stool  magnesium hydroxide (MILK OF MAGNESIA) 400 MG/5ML suspension, Take 30 mLs by mouth daily as needed for constipation or heartburn If no BM/small BM for 3 days/9 shifts  metFORMIN (GLUCOPHAGE-XR) 750 MG 24 hr tablet, Take 750 mg by mouth every morning  metoprolol succinate ER (TOPROL-XL) 100 MG 24 hr tablet, Take 1 tablet (100 mg) by mouth daily  neomycin-bacitracin-polymyxin (NEOSPORIN) 5-400-5000 ointment, Apply 1 each topically 3 times daily as needed (for minor abrasions)  nitroglycerin (NITROSTAT) 0.4 MG sublingual tablet, For chest pain place 1 tablet under the tongue every 5 minutes for 3 doses. If symptoms persist 5 minutes after 1st dose call 911.  nystatin (MYCOSTATIN) 203458 UNIT/GM external powder, Apply topically 2 times daily as needed (to affected area(s) as needed)  order for DME, Equipment to be ordered:  Scale- Qty 1  Commander Flex - Qty. 1  Pulse-Oximeter - Qty. 1  Use as directed  order for DME, Equipment being ordered: [facility] shoe  pantoprazole (PROTONIX) 40 MG EC tablet, Take 40 mg by mouth daily  polyethylene glycol (MIRALAX) 17 GM/Dose powder, Take 17 g by mouth daily as needed  spironolactone (ALDACTONE) 25 MG tablet, Take 0.5 tablets (12.5 mg) by mouth daily  thin (NO BRAND SPECIFIED) lancets, 1 each 4 times daily Use with lanceting device.  traZODone (DESYREL) 50 MG tablet, Take 50 mg by mouth At Bedtime  ULTICARE SHORT 31G X 8 MM insulin pen needle,   venetoclax (VENCLEXTA) 100 MG tablet, Take 200 mg by mouth daily  guaiFENesin (ROBITUSSIN) 100 MG/5ML SYRP, Take 10 mLs by mouth every 4 hours as needed for cough For cold symptoms and non productive cough (Patient not taking:  "Reported on 9/30/2021)    No current facility-administered medications on file prior to visit.      ROS:   See HPI    EXAM:   /61 (BP Location: Right arm, Patient Position: Chair, Cuff Size: Adult Regular)   Pulse 69   Ht 1.785 m (5' 10.28\")   Wt 83.5 kg (184 lb)   SpO2 98%   BMI 26.19 kg/m       GENERAL: Appears comfortable, in no acute distress.   HEENT: Eye symmetrical, no discharge or icterus bilaterally. Mucous membranes moist and without lesions.  CV: RRR, +S1S2, no murmur, rub, or gallop. JVP just above clavicle at 90 degrees.   RESPIRATORY: Respirations regular, even, and unlabored. Lungs CTA throughout.   GI: Soft and non distended with normoactive bowel sounds present in all quadrants. No tenderness, rebound, guarding. No hepatomegaly.   EXTREMITIES: AKA of the right leg. Left leg with moderate edema. Left leg is warm and well perfused.  NEUROLOGIC: Alert and interacting appropriately. No focal deficits.   MUSCULOSKELETAL: No joint swelling or tenderness.   SKIN: No jaundice. No rashes or lesions.     Labs, reviewed with patient in clinic today:  CBC RESULTS:  Lab Results   Component Value Date    WBC 3.0 (L) 09/25/2021    WBC 3.7 (L) 06/26/2021    RBC 3.40 (L) 09/25/2021    RBC 3.09 (L) 06/26/2021    HGB 10.4 (L) 09/25/2021    HGB 9.6 (L) 06/26/2021    HCT 32.2 (L) 09/25/2021    HCT 28.6 (L) 06/26/2021    MCV 95 09/25/2021    MCV 93 06/26/2021    MCH 30.6 09/25/2021    MCH 31.1 06/26/2021    MCHC 32.3 09/25/2021    MCHC 33.6 06/26/2021    RDW 16.9 (H) 09/25/2021    RDW 14.6 06/26/2021    PLT 99 (L) 09/25/2021     (L) 06/26/2021       CMP RESULTS:  Lab Results   Component Value Date     09/30/2021     06/28/2021    POTASSIUM 4.2 09/30/2021    POTASSIUM 4.1 06/28/2021    CHLORIDE 106 09/30/2021    CHLORIDE 109 06/28/2021    CO2 27 09/30/2021    CO2 24 06/28/2021    ANIONGAP 7 09/30/2021    ANIONGAP 4 06/28/2021     (H) 09/30/2021     (H) 06/28/2021    BUN 22 " 09/30/2021    BUN 43 (H) 06/28/2021    CR 1.47 (H) 09/30/2021    CR 1.33 (H) 06/28/2021    GFRESTIMATED 45 (L) 09/30/2021    GFRESTIMATED >60 07/08/2021    GFRESTIMATED 51 (L) 06/28/2021    GFRESTBLACK >60 07/08/2021    GFRESTBLACK 59 (L) 06/28/2021    JUSTINO 9.6 09/30/2021    JUSTINO 9.4 06/28/2021    BILITOTAL 0.8 09/23/2021    BILITOTAL 0.9 06/20/2021    ALBUMIN 3.0 (L) 09/23/2021    ALBUMIN 3.3 (L) 06/20/2021    ALKPHOS 84 09/23/2021    ALKPHOS 72 06/20/2021    ALT 12 09/23/2021    ALT 24 06/20/2021    AST 12 09/23/2021    AST 34 06/20/2021        INR RESULTS:  Lab Results   Component Value Date    INR 1.05 04/16/2021       Lab Results   Component Value Date    MAG 1.8 09/25/2021    MAG 2.0 06/23/2021     Lab Results   Component Value Date    NTBNPI 7,364 (H) 06/20/2021     Lab Results   Component Value Date    NTBNP 22,172 (H) 02/09/2018       Diagnostics:  TTE 6/20/21  1. Left ventricular systolic function is severely reduced. The visual ejection  fraction is estimated at 20-25%.  2. The right ventricle is normal in structure, function and size.  3. The left atrium is moderately dilated.  4. The aortic valve is trileaflet with aortic valve sclerosis.  5. There is mild (1+) mitral regurgitation.  ______________________________________________________________________________      TTE 10/5/18  The visual ejection fraction is estimated at 35-40%.  There is mild-moderate global hypokinesia of the left ventricle.  Mildly decreased right ventricular systolic function  In comparison with the previous study the LVEF has improved slightly.    Assessment and Plan:   Mr. 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 discitis and multple admissions for decompensated heart failure. Presents to  clinic for CORE f/u.    Patient is recently moved into assisted living which is led to a lot more consistency in taking his medications. His EF has worsened on his most recent echo.  It is now 20 to 25%.  We will titrate his neurohormonal blockade.  Then we will recheck an echo.  If he remains below 35% we will need to discuss ICD.    He was recently hospitalized for hypervolemia and his lasix was increased to 20 mg daily. It appear that his weight is up slightly from discharge, although difficult to tell d/t scale differences. We do not have reliable weights at this point from home.    # Chronic systolic heart failure/HFrEF secondary to ICM  (Ef 20-25%)  Stage C. NYHA Class IIIA  Fluid status: mild hypervolemia- continue lasix 20mg daily, increase entresto. If no improvement Monday, will increase lasix slightly  ACEi/ARB/ARNI: Entresto 49/51 BID - continue 1.5 of these tabs BID  BB: metoprolol  mg,  Aldosterone antagonist: defer  SCD prophylaxis: n/a EF now below 35%- will need to consider if not improving with CHF med titration  NSAID use: contraindicated  Sleep apnea evaluation: not discussed today  Remote monitoring: consider at future visits    # CAD s/p CABG  # PAD s/p iliac stenting and LE bypass  -no anginal symptoms, he is on asa and statin (defer high potency to cardiologist); he has stable claudication    # Atrial flutter  - he is on BB and digoxin and warfarin    # CKD stage 3a  - Recheck BMP in 1 week after increasing entresto    Plan:  - BMP in 1 week (recent lasix changes and now increased entresto)  - CORE clinic one month  - Dr. Leon in 2 months    Billing  - I managed 2+ stable chronic conditions  - I changed a prescription medication        Carly Browne PA-C  [facility]        CC  FABY LEON

## 2021-09-30 NOTE — NURSING NOTE
Message left for Debby with [facility] to reconcile medication list. Left message and asked for call back.

## 2021-09-30 NOTE — PATIENT INSTRUCTIONS
Take your medicines every day, as directed    Changes made today:  o We will call [facility] today to review your medication list  o No medication changes for now   Monitor Your Weight and Symptoms    Contact us if you:      Gain 2 pounds in one day or 5 pounds in one week    Feel more short of breath    Notice more leg swelling    Feel lightheadeded   Change your lifestyle    Limit Salt or Sodium:    2000 mg  Limit Fluids:    2000 mL or approximately 64 ounces  Eat a Heart Healthy Diet    Low in saturated fats  Stay Active:    Aim to move at least 150 minutes every  week         To Contact us    During Business Hours:  [phone], option # 1 (University)  Then option # 4 (medical questions)     After hours, weekends or holidays:   [phone], Option #4  Ask to speak to the On-Call Cardiologist. Inform them you are a CORE/heart failure patient at the [facility].     Use [employer] allows you to communicate directly with your heart team through secure messaging.    [employer] can be accessed any time on your phone, computer, or tablet.    If you need assistance, we'd be happy to help!         Keep your Heart Appointments:    - BMP in one week  - Please schedule with Dr. Valdes in October if available- if not, schedule CORe in October and Dr. Valdes in November  -

## 2021-10-01 RX ORDER — SACUBITRIL AND VALSARTAN 97; 103 MG/1; MG/1
1 TABLET, FILM COATED ORAL 2 TIMES DAILY
Qty: 180 TABLET | Refills: 3 | Status: SHIPPED | OUTPATIENT
Start: 2021-10-01 | End: 2021-11-01

## 2021-10-01 NOTE — TELEPHONE ENCOUNTER
Date: 10/1/2021    Time of Call: 1:25 PM     Diagnosis:  Heart failure     [ VORB ] Ordering provider: DICK Joshi    Order: Increase Entresto to  mg BID     Order received by: Taylor Arreola RN       Follow-up/additional notes: faxed to [facility] (they will send to their pharmacy)

## 2021-10-04 NOTE — TELEPHONE ENCOUNTER
Spoke with Hima:  He reports no adverse effects noted over the weekend, he denied dizziness or lightheadedness.  He did fall asleep easily but attributed that to staying up too late the night before.  He wasn't sure if his weight was taken over the weekend but thought it was 179 lbs this morning.      LVM for Lyubov ARZOLA at [facility].

## 2021-10-04 NOTE — TELEPHONE ENCOUNTER
Updated Hima, he will call with any weight gain, swelling or SOB.  Lab orders sent for later this week

## 2021-10-05 NOTE — DISCHARGE SUMMARY
[facility] MEDICINE  DISCHARGE SUMMARY     Primary Care Physician: Gene Guevara  Admission Date: 9/22/2021   Discharge Provider: Sandip Andre MD Discharge Date: 9/26/2021   Diet:   Active Diet and Nourishment Order   Procedures     Diet       Code Status: Prior   Activity: DCACTIVITY: Activity as tolerated        Condition at Discharge: Good     REASON FOR PRESENTATION(See Admission Note for Details)     Acute on chronic heart failure exacerbation with reduced ejection fraction    PRINCIPAL & ACTIVE DISCHARGE DIAGNOSES      Acute on chronic heart failure exacerbation with reduced ejection fraction with most recent EF of 20-25%.  Patient reports he feels like he is doing a lot better today.  Last [facility] was June 2021. Patient has been on 2L of oxygen since arrival and yesterday started IV furosemide 20 mg IV every 12 hours as well as spironolactone 12.5 mg daily. He diuresed 2,800 mL yesterday (9/23) and 1,000 mL so far today. Plan: Continue sacubitril-valsartan (entresto), digoxin, metoprolol succinate, IV furosemide transition to p.o. furosemide yesterday by cardiology, spironolactone initiated yesterday by cardiology, and supplemental oxygen weened yesterday     Type 2 Diabetes Mellitis. Restart home regimen     Stage 3 Chronic Kidney Disease. Yesterday (9/23) patient's creatinine level was 1.42 with a GFR estimate of 47. Yesterday's creatinine level is 1.39 with a GFR estimate of 49. Plan: Check at clinic followup.      CLL diagnosed about 15 years ago according to patient. Patient is on Venetoclax.    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  On 9/23 the patient's white blood count was 3.0, hemoglobin 8.3 and platelet.:  Follow-up with primary care in the near future for    Possible acute cellulitis left third toe.  Mild swelling.  Plan: Course of doxycycline and follow-up with primary     Atrial fibrillation on apixaban (eliquis).       Hyperlipidemia on atorvastatin.      GERD well controlled on pantoprazole.     On Trazodone for sleep.     Disposition:-Home today         PENDING LABS     Unresulted Labs Ordered in the Past 30 Days of this Admission     No orders found from 8/23/2021 to 9/23/2021.            PROCEDURES ( this hospitalization only)      None    RECOMMENDATIONS TO OUTPATIENT PROVIDER FOR F/U VISIT     Follow-up Appointments     Follow-up and recommended labs and tests       Follow up with primary care provider, Gene Guevara, within 7 days to   evaluate medication change and for hospital follow- up.  The following   labs/tests are recommended: Basic metabolic panel hemogram and magnesium    Follow-up with podiatrist next week for her third left toe infection    Follow-up with cardiology in 1 to 2 weeks.             DISPOSITION     Home    SUMMARY OF HOSPITAL COURSE:      Tad Manning is a 80 year old male with a past medical history of chronic systolic heart failure, Type 2 Diabetes Mellitus, hypertension, chronic kidney disease, chronic lymphocytic leukemia, atrial fibrillation, hyperlipidemia, GERD, and right BKA who was admitted on 9/22/2021 for acute heart failure exacerbation of heart failure with reduced EF 20 to 25%.  Patient was followed by cardiology.  Initially diuresed well and furosemide was transitioned to p.o. furosemide the day prior to discharge so spironolactone low-dose was added.  He was feeling back at his baseline at time of visit.  Patient also involving the left toe and was put on a course of doxycycline.      Discharge Medications with Med changes:     Discharge Medication List as of 9/25/2021 11:34 AM      START taking these medications    Details   doxycycline hyclate (VIBRAMYCIN) 100 MG capsule Take 1 capsule (100 mg) by mouth 2 times daily for 7 days, Disp-14 capsule, R-0, Local Print      polyethylene glycol (MIRALAX) 17 GM/Dose powder Take 17 g by mouth daily as needed, Disp-510 g, R-0, Local  Print      spironolactone (ALDACTONE) 25 MG tablet Take 0.5 tablets (12.5 mg) by mouth daily, Disp-15 tablet, R-0, Local Print         CONTINUE these medications which have CHANGED    Details   furosemide (LASIX) 20 MG tablet Take 1 tablet (20 mg) by mouth daily, Disp-30 tablet, R-0, Local Print         CONTINUE these medications which have NOT CHANGED    Details   acetaminophen (TYLENOL) 325 MG tablet Take 650 mg by mouth every 4 hours as needed for mild pain or fever, Historical      apixaban ANTICOAGULANT (ELIQUIS ANTICOAGULANT) 5 MG tablet Take 1 tablet (5 mg) by mouth 2 times daily, Disp-180 tablet, R-3, E-Prescribe      aspirin (ASA) 81 MG chewable tablet Take 81 mg by mouth daily , Historical      atorvastatin (LIPITOR) 20 MG tablet Take 1 tablet (20 mg) by mouth daily, Disp-90 tablet, R-0, E-Prescribe      Mahsa Protect (EUCERIN) external cream Apply topically 2 times daily as needed for dry skin or other Apply as needed to intact stage 1 pressure area and/or irritated skin, rash or excoriationHistorical      bisacodyl (DULCOLAX) 10 MG suppository Place 10 mg rectally daily as needed for constipation If no BM or small BM for 3 days/9 shifts, Historical      cholecalciferol 1000 UNITS TABS Take 1,000 Units by mouth daily, Disp-30 tablet, Transitional      citalopram (CELEXA) 20 MG tablet Take 20 mg by mouth every morning , Historical      digoxin (LANOXIN) 125 MCG tablet Take 1 tablet (125 mcg) by mouth daily, Disp-90 tablet, R-3, Historical      guaiFENesin (ROBITUSSIN) 100 MG/5ML SYRP Take 10 mLs by mouth every 4 hours as needed for cough For cold symptoms and non productive cough, Historical      insulin glargine (LANTUS PEN) 100 UNIT/ML pen Inject 30 Units Subcutaneous At Bedtime, TransitionalIf Lantus is not covered by insurance, may substitute Basaglar at same dose and frequency.        !! insulin lispro (HUMALOG KWIKPEN) 100 UNIT/ML (1 unit dial) KWIKPEN Inject 12 Units Subcutaneous 2 times daily  (before meals) Breakfast and lunch, Historical      !! insulin lispro (HUMALOG KWIKPEN) 100 UNIT/ML (1 unit dial) KWIKPEN Inject 14 Units Subcutaneous daily (with dinner), Historical      loperamide (IMODIUM A-D) 2 MG tablet Take 2-4 mg by mouth 4 times daily as needed for diarrhea Take 4 mg after 1st loose stool, then take 1 tablet as needed after each loose stool, Historical      magnesium hydroxide (MILK OF MAGNESIA) 400 MG/5ML suspension Take 30 mLs by mouth daily as needed for constipation or heartburn If no BM/small BM for 3 days/9 shifts, Historical      metFORMIN (GLUCOPHAGE-XR) 750 MG 24 hr tablet Take 750 mg by mouth every morning, Historical      metoprolol succinate ER (TOPROL-XL) 100 MG 24 hr tablet Take 1 tablet (100 mg) by mouth daily, Disp-90 tablet, R-3, E-Prescribe      neomycin-bacitracin-polymyxin (NEOSPORIN) 5-400-5000 ointment Apply 1 each topically 3 times daily as needed (for minor abrasions)Historical      nitroglycerin (NITROSTAT) 0.4 MG sublingual tablet For chest pain place 1 tablet under the tongue every 5 minutes for 3 doses. If symptoms persist 5 minutes after 1st dose call 911., Disp-25 tablet, Transitional      nystatin (MYCOSTATIN) 842051 UNIT/GM external powder Apply topically 2 times daily as needed (to affected area(s) as needed)Historical      pantoprazole (PROTONIX) 40 MG EC tablet Take 40 mg by mouth daily, Historical      traZODone (DESYREL) 50 MG tablet Take 50 mg by mouth At Bedtime, Historical      venetoclax (VENCLEXTA) 100 MG tablet Take 200 mg by mouth daily, Historical      ENTRESTO 49-51 MG per tablet TAKE 1.5 TABLETS BY MOUTH IN THE MORNING AND 1.5 TABLETS IN THE EVENING., Disp-180 tablet, R-3, E-Prescribe      alcohol swab prep pads Use to swab area of injection/jina as directed.Disp-100 each, [id number]      blood glucose (NO BRAND SPECIFIED) test strip Use to test blood sugar 4 times daily or as directed., Disp-100 strip, R-6, E-Prescribe      blood glucose  calibration (NO BRAND SPECIFIED) solution 1 drop every 14 days Use 1 drop to calibrate blood glucose monitor every 14 days, Disp-1 Bottle, R-3, E-Prescribe      blood glucose monitoring (NO BRAND SPECIFIED) meter device kit Use to test blood sugar 4 times daily or as directed.Disp-1 kit, [id number]      !! order for DME Equipment to be ordered:  Scale- Qty 1  Commander Flex - Qty. 1  Pulse-Oximeter - Qty. 1  Use as directedDisp-1 Units, R-0, Local Print      !! order for DME Equipment being ordered: DH2 shoeDisp-1 Device, R-0, Local Print      thin (NO BRAND SPECIFIED) lancets 1 each 4 times daily Use with lanceting device., Disp-100 each, R-1, E-Prescribe      ULTICARE SHORT 31G X 8 MM insulin pen needle DAWHistorical       !! - Potential duplicate medications found. Please discuss with provider.      STOP taking these medications       allopurinol (ZYLOPRIM) 300 MG tablet Comments:   Reason for Stopping:                     Rationale for medication changes:      Spironolactone 12.5 mg added for heart failure    Doxycycline for left third toe cellulitis        Consults       PHARMACY IP CONSULT  CARDIOLOGY IP CONSULT  SOCIAL WORK IP CONSULT  CORE CLINIC EVALUATION IP CONSULT  OCCUPATIONAL THERAPY ADULT IP CONSULT  PHYSICAL THERAPY ADULT IP CONSULT    Immunizations given this encounter     Most Recent Immunizations   Administered Date(s) Administered     COVID-19,PF,Pfizer 03/24/2021     HepA-Adult 11/03/2008     Influenza (High Dose) 3 valent vaccine 09/24/2018     Influenza Vaccine IM > 6 months Valent IIV4 (Alfuria,Fluzone) 11/15/2015     Pneumo Conj 13-V (2010&after) 12/21/2015     Pneumococcal 23 valent 12/05/2011     TDAP Vaccine (Boostrix) 12/22/2014     Td (Adult), Adsorbed 07/25/2003     Zoster vaccine, live 12/05/2011           Anticoagulation Information      Apixaban      SIGNIFICANT IMAGING FINDINGS     Results for orders placed or performed during the hospital encounter of 09/22/21   XR Chest Port  1 View    Impression    IMPRESSION: Cardiomegaly. Pulmonary vascularity normal. Mildly prominent interstitial pattern both lungs may represent edema. No airspace infiltrates or pleural effusions. Prior median sternotomy and CABG. Thoracic spinal stabilization rods.       SIGNIFICANT LABORATORY FINDINGS     Most Recent 3 CBC's:Recent Labs   Lab Test 09/25/21  0555 09/24/21  0452 09/23/21  0505   WBC 3.0* 3.5* 3.0*   HGB 10.4* 10.1* 9.3*   MCV 95 93 94   PLT 99* 109* 102*     Most Recent 3 BMP's:Recent Labs   Lab Test 09/30/21  1004 09/25/21  1118 09/25/21  0617 09/25/21  0555 09/24/21  2205 09/24/21  0612 09/24/21  0451     --   --  136  --   --  138   POTASSIUM 4.2  --   --  3.9  --   --  4.2   CHLORIDE 106  --   --  105  --   --  105   CO2 27  --   --  23  --   --  24   BUN 22  --   --  26  --   --  23   CR 1.47*  --   --  1.40*  --   --  1.37*   ANIONGAP 7  --   --  8  --   --  9   JUSTINO 9.6  --   --  9.0  --   --  9.1   * 248* 195* 190*   < >   < > 146*    < > = values in this interval not displayed.         Discharge Orders        Reason for your hospital stay    Patient admitted for acute heart failure exacerbation on chronic heart failure.  He is markedly improved.  May also have a mild cellulitis involving the third left toe and started on doxycycline for that.     Follow-up and recommended labs and tests     Follow up with primary care provider, Gene Guevara, within 7 days to evaluate medication change and for hospital follow- up.  The following labs/tests are recommended: Basic metabolic panel hemogram and magnesium    Follow-up with podiatrist next week for her third left toe infection    Follow-up with cardiology in 1 to 2 weeks.     Activity    Your activity upon discharge: activity as tolerated     Diet    Follow this diet upon discharge: Diabetic (1800 ADA)       Examination   Physical Exam      Wt Readings from Last 1 Encounters:   09/30/21 83.5 kg (184 lb)       General Appearance: Alert and  up in no apparent distress  Respiratory: Clear throughout  Cardiovascular: Regular rate and rhythm  GI: Soft, nontender  Skin: Mild to moderate erythema mild swelling of the left third toe with a small area of abrasion to the extensor aspect of the mid toe  Other: Within normal      Please see EMR for more detailed significant labs, imaging, consultant notes etc.    I, Sandip Andre MD, personally saw the patient today and spent greater than 30 minutes discharging this patient.    Sandip Andre MD  [facility]    CC:Gene Guevara

## 2021-10-11 ENCOUNTER — PATIENT OUTREACH (OUTPATIENT)
Dept: CARDIOLOGY | Facility: CLINIC | Age: 81
End: 2021-10-11

## 2021-10-11 NOTE — TELEPHONE ENCOUNTER
Called DARIUSZ Guardado at home care to follow up on labs that were ordered to be drawn end of last week. Left message to follow up on results and asked for call back. Verónica Jarquin RN

## 2021-10-18 ENCOUNTER — PATIENT OUTREACH (OUTPATIENT)
Dept: CARDIOLOGY | Facility: CLINIC | Age: 81
End: 2021-10-18

## 2021-10-18 DIAGNOSIS — I50.9 ACUTE ON CHRONIC CONGESTIVE HEART FAILURE, UNSPECIFIED HEART FAILURE TYPE (H): ICD-10-CM

## 2021-10-18 DIAGNOSIS — I50.22 CHRONIC SYSTOLIC HEART FAILURE (H): ICD-10-CM

## 2021-10-18 NOTE — TELEPHONE ENCOUNTER
Have not seen lab results come through on [facility] at all. Called to check in. He reports he's not weighing himself daily but home nurse is weekly. He doesn't think he's been weighed in a while and not sure what the most recent readings are. He does report he feels good and doesn't have any worsening symptoms of shortness of breath or swelling in legs.   Called home nurse to follow up on labs and weights. Left message and asked for call back. Verónica Jarquin RN

## 2021-10-20 ENCOUNTER — LAB REQUISITION (OUTPATIENT)
Dept: LAB | Facility: CLINIC | Age: 81
End: 2021-10-20
Payer: COMMERCIAL

## 2021-10-20 DIAGNOSIS — I50.22 CHRONIC SYSTOLIC (CONGESTIVE) HEART FAILURE (H): ICD-10-CM

## 2021-10-20 NOTE — TELEPHONE ENCOUNTER
Message back from Liset, Director of Nursing that they still did not get lab fax. Will fax again. Called her back and also left a message requesting updated weights

## 2021-10-20 NOTE — TELEPHONE ENCOUNTER
See separate encounters - have been leaving messages for Debby home care RN. Received faxed message back stating Debby was no longer with [facility] and to fax lab orders to [phone]. Orders faxed.   Per fax last weight was 188 - weighing weekly on Wednesdays so this would be from 10/13. This weight is up from previous recordings but is a week old so will call later to get updated weights. Verónica Jarquin, RN

## 2021-10-20 NOTE — TELEPHONE ENCOUNTER
Called back to request updated weights. Left message and asked for call back. Verónica Jarquin RN

## 2021-10-21 RX ORDER — FUROSEMIDE 20 MG
20 TABLET ORAL 2 TIMES DAILY
Qty: 60 TABLET | Refills: 3 | Status: SHIPPED | OUTPATIENT
Start: 2021-10-21 | End: 2021-10-25

## 2021-10-21 NOTE — TELEPHONE ENCOUNTER
Liset called back. She confirms Hima's weight is up to 191. Reports he seems like he is feeling ok and was walking the halls with his walker earlier.   She reports he didn't answer his door this morning when the phlebotomist knocked and so labs did not get drawn. He is on their reschedule list for next Thursday.     Needs signed orders for Lasix increase faxed over and will get sent.     Called [facility] to try to review Lasix increase and getting labs with him, no answer. Left detailed voicemail asking if we can try to get him to a lab tomorrow close to him.   Date: 10/21/2021    Time of Call: 4:25 PM     Diagnosis:  HF     [ VORB ] Ordering provider: Deb JENNINGS  Order: Increase Lasix to 20 mg BID. BMP in one week     Order received by: Verónica Jarquin RN      Follow-up/additional notes: Labs are already set for next week since he missed it today. Trying to get baseline labs in next day or so at his local clinic.

## 2021-10-21 NOTE — TELEPHONE ENCOUNTER
Lab fax went through and labs are in process this morning. Have not heard back from home care nurse on weekly weights.  Called [facility] to check in. He thinks his weight yesterday was 191. This is where weight was when he went into the hospital recently.   He reports feeling well. Denies SOB, ALEXIS, any worsening swelling. Has some swelling in left leg but that is at baseline.     Called nursing office again and left voicemail asking for call back with weight information to verify he is at 191.   Will update provider.

## 2021-10-21 NOTE — TELEPHONE ENCOUNTER
Called Liset, Director of Nursing for [facility] and asked for call back with weights from this week.   Left message and asked for call back.

## 2021-10-22 ENCOUNTER — PATIENT OUTREACH (OUTPATIENT)
Dept: CARDIOLOGY | Facility: CLINIC | Age: 81
End: 2021-10-22

## 2021-10-22 DIAGNOSIS — I50.9 ACUTE ON CHRONIC CONGESTIVE HEART FAILURE, UNSPECIFIED HEART FAILURE TYPE (H): ICD-10-CM

## 2021-10-22 DIAGNOSIS — I50.22 CHRONIC SYSTOLIC HEART FAILURE (H): ICD-10-CM

## 2021-10-22 NOTE — TELEPHONE ENCOUNTER
Called [facility] to help set up lab appt locally for today. No answer. Left message letting him know that we want these labs done today, let him know he could go to [facility] for this and to call back if he needs help getting an appointment scheduled.

## 2021-10-25 ENCOUNTER — PATIENT OUTREACH (OUTPATIENT)
Dept: CARDIOLOGY | Facility: CLINIC | Age: 81
End: 2021-10-25

## 2021-10-25 RX ORDER — FUROSEMIDE 20 MG
TABLET ORAL
Qty: 180 TABLET | Refills: 0 | Status: SHIPPED | OUTPATIENT
Start: 2021-10-25 | End: 2021-10-28

## 2021-10-25 NOTE — TELEPHONE ENCOUNTER
Called [facility] to try to set up lab appointment. Spoke with home care nurse last week and orders faxed to increase Lasix rto 20 mg BID. They were unable to draw labs at his [facility] as he didn't answer the door for phlebotomist and CORE PA would like labs asap.   Left message for [facility] and asked for call back. Verónica Jarquin RN

## 2021-10-27 NOTE — PROGRESS NOTES
Cardiology Clinic Note    History:   81 year old delightful man with a history of ischemic heart disease, heart failure with reduced ejection fraction (Stage C, NYHA III), post bypass surgery L-LAD, SVG-OM1, SVG-D1 in 1995.  He then underwent PCI to mLAD in 2018.  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  In June 2021, he was admitted to [facility] for an NSTEMI which was medically managed.He has recurrence of CLL and is only monthly rituxan infusions.  He presents today accompanied by his sister-in-law for follow up of ischemic cardiomyopathy.     Interval Event  He is tolerating entresto 49/51 mg . He is now at an assisted living facility and is adjusting well.  He denies any chest pain, orthopnea, PND, palpitations, presyncope or syncope.    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)      Chronic systolic heart failure (H) 6/6/2017     Confusion 6/20/2021     Coronary artery disease     triple bypass 1995     CRF (chronic renal failure), stage 3 (moderate) 1/20/2017     Critical lower limb ischemia 1/10/2014     Diabetes mellitus (H)      Diabetes mellitus, type 2 (H) 1/11/2014     Diabetic polyneuropathy (H)      Discitis 2/8/2017     Epidural abscess 2/9/2017     Fall 2/7/2017     History of blood transfusion      HTN (hypertension) 1/11/2014     Hyperglycemia 1/9/2020      Hyperlipidaemia      Hyperlipidemia with target LDL less than 100 2014    Formatting of this note might be different from the original. Overview:  Diagnosis updated by automated process. Provider to review and confirm. Formatting of this note might be different from the original. Diagnosis updated by automated process. Provider to review and confirm.     Hypertension      Long term current use of anticoagulant therapy 2018     Non-healing ulcer of foot (H)     Right     Noninflammatory pericardial effusion      Osteomyelitis (H) 2014     Osteomyelitis of foot, right, acute (H) 2/15/2017     Osteomyelitis of left foot (H)      PVD (peripheral vascular disease) (H)      Recurrent falls 2021     Sebaceous cyst        FAMILY HISTORY:  Family History   Problem Relation Age of Onset     Cancer Mother         leukemia     Leukemia Mother        SOCIAL HISTORY:  Social History     Socioeconomic History     Marital status: Single     Spouse name: None     Number of children: None     Years of education: None     Highest education level: None   Occupational History     None   Tobacco Use     Smoking status: Former Smoker     Packs/day: 2.00     Years: 30.00     Pack years: 60.00     Types: Cigarettes     Quit date: 1995     Years since quittin.5     Smokeless tobacco: Never Used   Substance and Sexual Activity     Alcohol use: No     Drug use: No     Sexual activity: None   Other Topics Concern     Parent/sibling w/ CABG, MI or angioplasty before 65F 55M? Not Asked   Social History Narrative    Lives independently with SO. 2017      Social Determinants of Health     Financial Resource Strain:      Difficulty of Paying Living Expenses:    Food Insecurity:      Worried About Running Out of Food in the Last Year:      Ran Out of Food in the Last Year:    Transportation Needs:      Lack of Transportation (Medical):      Lack of Transportation (Non-Medical):    Physical Activity:      Days of  Exercise per Week:      Minutes of Exercise per Session:    Stress:      Feeling of Stress :    Social Connections:      Frequency of Communication with Friends and Family:      Frequency of Social Gatherings with Friends and Family:      Attends [employer] Services:      Active Member of Clubs or Organizations:      Attends Club or Organization Meetings:      Marital Status:    Intimate Partner Violence:      Fear of Current or Ex-Partner:      Emotionally Abused:      Physically Abused:      Sexually Abused:        CURRENT MEDICATIONS:  Current Outpatient Medications   Medication Sig Dispense Refill     acetaminophen (TYLENOL) 325 MG tablet Take 650 mg by mouth every 4 hours as needed for mild pain or fever       alcohol swab prep pads Use to swab area of injection/jina as directed. 100 each 3     apixaban ANTICOAGULANT (ELIQUIS ANTICOAGULANT) 5 MG tablet Take 1 tablet (5 mg) by mouth 2 times daily 180 tablet 3     aspirin (ASA) 81 MG chewable tablet Take 81 mg by mouth daily        atorvastatin (LIPITOR) 20 MG tablet Take 1 tablet (20 mg) by mouth daily 90 tablet 0     Mahsa Protect (EUCERIN) external cream Apply topically 2 times daily as needed for dry skin or other Apply as needed to intact stage 1 pressure area and/or irritated skin, rash or excoriation       bisacodyl (DULCOLAX) 10 MG suppository Place 10 mg rectally daily as needed for constipation If no BM or small BM for 3 days/9 shifts       blood glucose (NO BRAND SPECIFIED) test strip Use to test blood sugar 4 times daily or as directed. 100 strip 6     blood glucose calibration (NO BRAND SPECIFIED) solution 1 drop every 14 days Use 1 drop to calibrate blood glucose monitor every 14 days 1 Bottle 3     blood glucose monitoring (NO BRAND SPECIFIED) meter device kit Use to test blood sugar 4 times daily or as directed. 1 kit 0     cholecalciferol 1000 UNITS TABS Take 1,000 Units by mouth daily 30 tablet      citalopram (CELEXA) 20 MG tablet Take 20 mg by  mouth every morning        digoxin (LANOXIN) 125 MCG tablet Take 1 tablet (125 mcg) by mouth daily 90 tablet 3     furosemide (LASIX) 20 MG tablet TAKE 1 TABLET(20 MG) BY MOUTH TWICE DAILY 180 tablet 0     guaiFENesin (ROBITUSSIN) 100 MG/5ML SYRP Take 10 mLs by mouth every 4 hours as needed for cough For cold symptoms and non productive cough (Patient not taking: Reported on 9/30/2021)       insulin glargine (LANTUS PEN) 100 UNIT/ML pen Inject 30 Units Subcutaneous At Bedtime       insulin lispro (HUMALOG KWIKPEN) 100 UNIT/ML (1 unit dial) KWIKPEN Inject 12 Units Subcutaneous 2 times daily (before meals) Breakfast and lunch       insulin lispro (HUMALOG KWIKPEN) 100 UNIT/ML (1 unit dial) KWIKPEN Inject 14 Units Subcutaneous daily (with dinner)       loperamide (IMODIUM A-D) 2 MG tablet Take 2-4 mg by mouth 4 times daily as needed for diarrhea Take 4 mg after 1st loose stool, then take 1 tablet as needed after each loose stool       magnesium hydroxide (MILK OF MAGNESIA) 400 MG/5ML suspension Take 30 mLs by mouth daily as needed for constipation or heartburn If no BM/small BM for 3 days/9 shifts       metFORMIN (GLUCOPHAGE-XR) 750 MG 24 hr tablet Take 750 mg by mouth every morning       metoprolol succinate ER (TOPROL-XL) 100 MG 24 hr tablet Take 1 tablet (100 mg) by mouth daily 90 tablet 3     neomycin-bacitracin-polymyxin (NEOSPORIN) 5-400-5000 ointment Apply 1 each topically 3 times daily as needed (for minor abrasions)       nitroglycerin (NITROSTAT) 0.4 MG sublingual tablet For chest pain place 1 tablet under the tongue every 5 minutes for 3 doses. If symptoms persist 5 minutes after 1st dose call 911. 25 tablet      nystatin (MYCOSTATIN) 160786 UNIT/GM external powder Apply topically 2 times daily as needed (to affected area(s) as needed)       order for DME Equipment to be ordered:  Scale- Qty 1  Commander Flex - Qty. 1  Pulse-Oximeter - Qty. 1  Use as directed 1 Units 0     order for DME Equipment being  ordered: DH2 shoe 1 Device 0     pantoprazole (PROTONIX) 40 MG EC tablet Take 40 mg by mouth daily       polyethylene glycol (MIRALAX) 17 GM/Dose powder Take 17 g by mouth daily as needed 510 g 0     sacubitril-valsartan (ENTRESTO)  MG per tablet Take 1 tablet by mouth 2 times daily 180 tablet 3     spironolactone (ALDACTONE) 25 MG tablet Take 0.5 tablets (12.5 mg) by mouth daily 15 tablet 0     thin (NO BRAND SPECIFIED) lancets 1 each 4 times daily Use with lanceting device. 100 each 1     traZODone (DESYREL) 50 MG tablet Take 50 mg by mouth At Bedtime       ULTICARE SHORT 31G X 8 MM insulin pen needle        venetoclax (VENCLEXTA) 100 MG tablet Take 200 mg by mouth daily         ROS:   10 point ROS negative except HPI    EXAM:  BP (!) 143/60 (BP Location: Left arm, Patient Position: Chair, Cuff Size: Adult Regular)   Pulse 75   Wt 87.2 kg (192 lb 3.2 oz)   SpO2 98%   BMI 27.36 kg/m    General: appears comfortable, alert and articulate  Head: normocephalic, atraumatic  Eyes: anicteric sclera, EOMI  Neck: no adenopathy  Orophyarynx: moist mucosa, no lesions, dentition intact  Heart: regular, S1/S2, no murmur, gallop, rub  Lungs: clear, no rales or wheezing  Abdomen: soft, non-tender, bowel sounds present, no hepatosplenomegaly  Extremities: no clubbing, cyanosis or edema, R BKA with prosthesis  Neurological: normal speech and affect, no gross motor deficits  Skin - rashes    Labs:    Chemistry panel: Recent Labs   Lab Test 11/01/21  1358 10/28/21  1116 09/25/21  0617 09/25/21  0555 09/24/21  0612 09/24/21  0451 09/23/21  0654 09/23/21  0505 09/22/21  2229 09/22/21  1957    134*   < > 136   < > 138   < > 138   < > 141   POTASSIUM 4.2 4.3   < > 3.9   < > 4.2   < > 3.7   < > 4.4   CHLORIDE 102 100   < > 105   < > 105   < > 105   < > 108*   CO2 24 23   < > 23   < > 24   < > 24   < > 22   ANIONGAP 9 11   < > 8   < > 9   < > 9   < > 11   * 351*   < > 190*   < > 146*   < > 184*   < > 61*   BUN 32*  31*   < > 26   < > 23   < > 18   < > 18   CR 1.46* 1.78*   < > 1.40*   < > 1.37*   < > 1.42*   < > 1.39*   JUSTINO 9.1 9.9   < > 9.0   < > 9.1   < > 8.9   < > 9.6   MAG  --   --   --  1.8  --  2.0   < > 1.6*   < > 1.8   GFRESTIMATED 45* 36*   < > 48*   < > 49*   < > 47*   < > 48*   AST  --   --   --   --   --   --   --  12  --  19   ALT  --   --   --   --   --   --   --  12  --  14    < > = values in this interval not displayed.       CBC:   Recent Labs   Lab Test 09/25/21  0555 09/24/21  0452   WBC 3.0* 3.5*   RBC 3.40* 3.36*   HGB 10.4* 10.1*   HCT 32.2* 31.3*   MCV 95 93   MCH 30.6 30.1   MCHC 32.3 32.3   RDW 16.9* 17.2*   PLT 99* 109*       Lipid Panel:  Recent Labs   Lab Test 06/21/21  0522 05/18/20  1117 04/09/18  0951 05/23/14  0745 01/10/14  0923 01/10/14  0923   CHOL 116 121   < > 134   < > 92   HDL 38* 36*   < > 34*   < > 28*   LDL 49 60   < > 48   < > 45   TRIG 145 123   < > 257*   < > 92   CHOLHDLRATIO  --   --   --  3.9  --  3.3    < > = values in this interval not displayed.       Thyroid:   TSH   Date Value Ref Range Status   10/03/2018 1.11 0.40 - 4.00 mU/L Final     No results found for: T4  Hemoglobin A1C   Date Value Ref Range Status   09/23/2021 7.6 (H) <=5.6 % Final     Comment:       Prediabetes: 5.7 to 6.4%        Diabetes:  >=6.5%     Patients with Hgb F >5%, total bilirubin >10.0 mg/dL, abnormal red cell turnover, severe renal or hepatic disease or malignancy should not have this A1C method used to diagnose or monitor diabetes.    06/20/2021 9.0 (H) 0 - 5.6 % Final     Comment:     Normal <5.7% Prediabetes 5.7-6.4%  Diabetes 6.5% or higher - adopted from ADA   consensus guidelines.       INR   Date Value Ref Range Status   04/16/2021 1.05 0.86 - 1.14 Final         EKG Sept 2021 - sinus rhythm with first degree AVB, LAD      Echocardiograms:   June 2021    1. Left ventricular systolic function is severely reduced. The visual ejection  fraction is estimated at 20-25%.  2. The right ventricle is  normal in structure, function and size.  3. The left atrium is moderately dilated.  4. The aortic valve is trileaflet with aortic valve sclerosis.  5. There is mild (1+) mitral regurgitation.    2/15/2018  Severe left ventricular dilation is present.  Biplane LV EF 20%.  Severe diffuse hypokinesis is present.  Mild to moderate right ventricular dilation is present.  Global right ventricular function is moderately to severely reduced.  Dilation of the inferior vena cava is present with abnormal respiratory  variation in diameter.  No pericardial effusion is present.  A left pleural effusion is present.    10/2015  Left ventricular function  Is moderately reduced, estimated at 35-40%.  There is mild-moderate global hypokinesia of the left ventricle.  Mildly decreased right ventricular systolic function    6/20/21  Interpretation Summary     1. Left ventricular systolic function is severely reduced. The visual ejection  fraction is estimated at 20-25%.  2. The right ventricle is normal in structure, function and size.  3. The left atrium is moderately dilated.  4. The aortic valve is trileaflet with aortic valve sclerosis.  5. There is mild (1+) mitral regurgitation.       [facility] 3/2018      [facility] 6/19/21    1.  Severe multivessel CAD s/p CABG.                LM has severe stenosis.                LAD has sever proximal stenosis and midLAD is occluded. Distal LAD fills via patent LIMA.                LCx is occluded.                 RCA has moderate diffuse disease.  2.  LIMA to LAD is patent.  Mid to distal LAD stents are patent.  3.  SVG to OM is patent.  4.  SVG to Diagonal is patent with a 70% stenosis and supplies diagonal branches are small and diffusely diseased.      Assessment and Plan:     Tad Manning is a 82 yo male with    HFrEF (Stage C, NYHA III)  CAD, s/p CABG L-LAD, SVG-OM1, SVG-D1 in 1995.   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  He has limited exercise tolerance but is able to ambulate without much symptoms using his walker and the prosthesis.  He has ischemic cardiomyopathy and chronic systolic heart failure with a LV ejection fraction around 25%. He has severe native CAD and disease in the SVG graft to the diagonal. On exam today he is euvolemic. His blood pressure is slightly elevated today and his lipids are at goal.  He is on guideline directed medical therapy including aspirin, atorvastatin, metoprolol, Entresto, spironolactone and digoxin. His most recent lipid panel is at goal. His renal function and electrolytes are stable. Dig level in June 2021 was 0.4.  I will increase entresto to  mg twice daily (from 49-51 BID) and check his renal function in 2 weeks. He would also likely benefit from adding empaglaflozin 10mg to his regimen but will come with an increased risk of urinary infection. I will defer starting this to his endocrinologist.       He is tolerating apixaban well without any bleeding issues with paroxysmal atrial fibrillation.  He appears to be in a regular rhythm on auscultation today.  September '21 EKG was notable for sinus rhythm.    Follow-up in [facility] in 1 month and with me in 6 months.     Clarisse Valdes MD, MS  Professor of Medicine  Cardiovascular division

## 2021-10-28 ENCOUNTER — PATIENT OUTREACH (OUTPATIENT)
Dept: CARDIOLOGY | Facility: CLINIC | Age: 81
End: 2021-10-28

## 2021-10-28 DIAGNOSIS — I50.9 ACUTE ON CHRONIC CONGESTIVE HEART FAILURE, UNSPECIFIED HEART FAILURE TYPE (H): ICD-10-CM

## 2021-10-28 DIAGNOSIS — I50.22 CHRONIC SYSTOLIC HEART FAILURE (H): ICD-10-CM

## 2021-10-28 LAB
ANION GAP SERPL CALCULATED.3IONS-SCNC: 11 MMOL/L (ref 5–18)
BUN SERPL-MCNC: 31 MG/DL (ref 8–28)
CALCIUM SERPL-MCNC: 9.9 MG/DL (ref 8.5–10.5)
CHLORIDE BLD-SCNC: 100 MMOL/L (ref 98–107)
CO2 SERPL-SCNC: 23 MMOL/L (ref 22–31)
CREAT SERPL-MCNC: 1.78 MG/DL (ref 0.7–1.3)
GFR SERPL CREATININE-BSD FRML MDRD: 36 ML/MIN/1.73M2
GLUCOSE BLD-MCNC: 351 MG/DL (ref 70–125)
POTASSIUM BLD-SCNC: 4.3 MMOL/L (ref 3.5–5)
SODIUM SERPL-SCNC: 134 MMOL/L (ref 136–145)

## 2021-10-28 PROCEDURE — P9604 ONE-WAY ALLOW PRORATED TRIP: HCPCS | Mod: ORL | Performed by: PHYSICIAN ASSISTANT

## 2021-10-28 PROCEDURE — 80048 BASIC METABOLIC PNL TOTAL CA: CPT | Mod: ORL | Performed by: PHYSICIAN ASSISTANT

## 2021-10-28 PROCEDURE — 36415 COLL VENOUS BLD VENIPUNCTURE: CPT | Mod: ORL | Performed by: PHYSICIAN ASSISTANT

## 2021-10-28 RX ORDER — FUROSEMIDE 20 MG
20 TABLET ORAL DAILY
Qty: 90 TABLET | Refills: 0
Start: 2021-10-28 | End: 2021-11-17

## 2021-10-28 NOTE — TELEPHONE ENCOUNTER
Called Hima to check in. Have been unable to reach to try to get labs sooner. Was due to get them today with his Assisted Living.   No answer and no voicemail.   Called Assisted Living nurse to follow up. Left voicemail asking for call back to make sure labs were drawn and check on weights this week. Verónica Jarquin RN

## 2021-10-28 NOTE — TELEPHONE ENCOUNTER
Call back from Liset, home care nurse. She confirms Hima has been taking Lasix 20 mg BID since last week. Weight yesterday was 173.   On 10/21 was 191 and lasix was increased. She confirms labs did get drawn today and will fax results. Verónica Jarquin RN

## 2021-10-28 NOTE — TELEPHONE ENCOUNTER
Reviewed with Deb JENNINGS. Until lab results are back, decrease Lasix to 20 mg daily.   Date: 10/28/2021    Time of Call: 3:22 PM     Diagnosis:  HF     [ VORB ] Ordering provider: Deb Amor rPA  Order: Reduce Lasix 20 mg daily.      Order received by: Verónica Jarquin RN      Follow-up/additional notes: orders faxed to [facility] and called, left message with updated orders.  Will follow up on lab results.

## 2021-10-29 NOTE — TELEPHONE ENCOUNTER
Labs reviewed by Deb JENNINGS. Creatinine elevated. Lasix already decreased yesterday to 20 mg daily.     Called Tad, reviewed labs. He reports feeling well. Denies dizziness, lightheadedness.   Has cardiology follow up Monday with labs.   Verónica Jarquin RN

## 2021-11-01 ENCOUNTER — LAB (OUTPATIENT)
Dept: LAB | Facility: CLINIC | Age: 81
End: 2021-11-01
Attending: INTERNAL MEDICINE
Payer: COMMERCIAL

## 2021-11-01 ENCOUNTER — OFFICE VISIT (OUTPATIENT)
Dept: CARDIOLOGY | Facility: CLINIC | Age: 81
End: 2021-11-01
Attending: INTERNAL MEDICINE
Payer: COMMERCIAL

## 2021-11-01 VITALS
OXYGEN SATURATION: 98 % | SYSTOLIC BLOOD PRESSURE: 143 MMHG | HEART RATE: 75 BPM | WEIGHT: 192.2 LBS | DIASTOLIC BLOOD PRESSURE: 60 MMHG | BODY MASS INDEX: 27.36 KG/M2

## 2021-11-01 DIAGNOSIS — N18.32 TYPE 2 DIABETES MELLITUS WITH STAGE 3B CHRONIC KIDNEY DISEASE, UNSPECIFIED WHETHER LONG TERM INSULIN USE (H): ICD-10-CM

## 2021-11-01 DIAGNOSIS — I25.5 ISCHEMIC CARDIOMYOPATHY: Primary | ICD-10-CM

## 2021-11-01 DIAGNOSIS — I48.0 PAROXYSMAL A-FIB (H): ICD-10-CM

## 2021-11-01 DIAGNOSIS — I50.22 CHRONIC SYSTOLIC HEART FAILURE (H): ICD-10-CM

## 2021-11-01 DIAGNOSIS — E11.22 TYPE 2 DIABETES MELLITUS WITH STAGE 3B CHRONIC KIDNEY DISEASE, UNSPECIFIED WHETHER LONG TERM INSULIN USE (H): ICD-10-CM

## 2021-11-01 LAB
ANION GAP SERPL CALCULATED.3IONS-SCNC: 9 MMOL/L (ref 3–14)
BUN SERPL-MCNC: 32 MG/DL (ref 7–30)
CALCIUM SERPL-MCNC: 9.1 MG/DL (ref 8.5–10.1)
CHLORIDE BLD-SCNC: 102 MMOL/L (ref 94–109)
CO2 SERPL-SCNC: 24 MMOL/L (ref 20–32)
CREAT SERPL-MCNC: 1.46 MG/DL (ref 0.66–1.25)
GFR SERPL CREATININE-BSD FRML MDRD: 45 ML/MIN/1.73M2
GLUCOSE BLD-MCNC: 426 MG/DL (ref 70–99)
POTASSIUM BLD-SCNC: 4.2 MMOL/L (ref 3.4–5.3)
SODIUM SERPL-SCNC: 135 MMOL/L (ref 133–144)

## 2021-11-01 PROCEDURE — 99214 OFFICE O/P EST MOD 30 MIN: CPT | Performed by: INTERNAL MEDICINE

## 2021-11-01 PROCEDURE — 36415 COLL VENOUS BLD VENIPUNCTURE: CPT | Performed by: PATHOLOGY

## 2021-11-01 PROCEDURE — 80048 BASIC METABOLIC PNL TOTAL CA: CPT | Performed by: PATHOLOGY

## 2021-11-01 PROCEDURE — G0463 HOSPITAL OUTPT CLINIC VISIT: HCPCS

## 2021-11-01 RX ORDER — SACUBITRIL AND VALSARTAN 97; 103 MG/1; MG/1
1 TABLET, FILM COATED ORAL 2 TIMES DAILY
Qty: 180 TABLET | Refills: 3 | Status: SHIPPED | OUTPATIENT
Start: 2021-11-01 | End: 2021-11-02

## 2021-11-01 RX ORDER — SACUBITRIL AND VALSARTAN 97; 103 MG/1; MG/1
1 TABLET, FILM COATED ORAL 2 TIMES DAILY
Qty: 180 TABLET | Refills: 3 | Status: SHIPPED | OUTPATIENT
Start: 2021-11-01 | End: 2021-11-01

## 2021-11-01 ASSESSMENT — PAIN SCALES - GENERAL: PAINLEVEL: NO PAIN (0)

## 2021-11-01 NOTE — LETTER
11/1/2021      RE: Tad Manning  [address] [place] [id number]       Dear Colleague,    Thank you for the opportunity to participate in the care of your patient, Tad Manning, at the [facility]. Please see a copy of my visit note below.    Cardiology Clinic Note    History:   81 year old delightful man with a history of ischemic heart disease, heart failure with reduced ejection fraction (Stage C, NYHA III), post bypass surgery L-LAD, SVG-OM1, SVG-D1 in 1995.  He then underwent PCI to mLAD in 2018.  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  In June 2021, he was admitted to [facility] for an NSTEMI which was medically managed.He has recurrence of CLL and is only monthly rituxan infusions.  He presents today accompanied by his sister-in-law for follow up of ischemic cardiomyopathy.     Interval Event  He is tolerating entresto 49/51 mg . He is now at an assisted living facility and is adjusting well.  He denies any chest pain, orthopnea, PND, palpitations, presyncope or syncope.    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)      Chronic systolic heart failure (H) 6/6/2017     Confusion 6/20/2021     Coronary artery disease     triple bypass 1995     CRF (chronic  renal failure), stage 3 (moderate) 2017     Critical lower limb ischemia 1/10/2014     Diabetes mellitus (H)      Diabetes mellitus, type 2 (H) 2014     Diabetic polyneuropathy (H)      Discitis 2017     Epidural abscess 2017     Fall 2017     History of blood transfusion      HTN (hypertension) 2014     Hyperglycemia 2020     Hyperlipidaemia      Hyperlipidemia with target LDL less than 100 2014    Formatting of this note might be different from the original. Overview:  Diagnosis updated by automated process. Provider to review and confirm. Formatting of this note might be different from the original. Diagnosis updated by automated process. Provider to review and confirm.     Hypertension      Long term current use of anticoagulant therapy 2018     Non-healing ulcer of foot (H)     Right     Noninflammatory pericardial effusion      Osteomyelitis (H) 2014     Osteomyelitis of foot, right, acute (H) 2/15/2017     Osteomyelitis of left foot (H)      PVD (peripheral vascular disease) (H)      Recurrent falls 2021     Sebaceous cyst        FAMILY HISTORY:  Family History   Problem Relation Age of Onset     Cancer Mother         leukemia     Leukemia Mother        SOCIAL HISTORY:  Social History     Socioeconomic History     Marital status: Single     Spouse name: None     Number of children: None     Years of education: None     Highest education level: None   Occupational History     None   Tobacco Use     Smoking status: Former Smoker     Packs/day: 2.00     Years: 30.00     Pack years: 60.00     Types: Cigarettes     Quit date: 1995     Years since quittin.5     Smokeless tobacco: Never Used   Substance and Sexual Activity     Alcohol use: No     Drug use: No     Sexual activity: None   Other Topics Concern     Parent/sibling w/ CABG, MI or angioplasty before 65F 55M? Not Asked   Social History Narrative    Lives independently with SO. 2017      Social  Determinants of Health     Financial Resource Strain:      Difficulty of Paying Living Expenses:    Food Insecurity:      Worried About Running Out of Food in the Last Year:      Ran Out of Food in the Last Year:    Transportation Needs:      Lack of Transportation (Medical):      Lack of Transportation (Non-Medical):    Physical Activity:      Days of Exercise per Week:      Minutes of Exercise per Session:    Stress:      Feeling of Stress :    Social Connections:      Frequency of Communication with Friends and Family:      Frequency of Social Gatherings with Friends and Family:      Attends Yarsanism Services:      Active Member of Clubs or Organizations:      Attends Club or Organization Meetings:      Marital Status:    Intimate Partner Violence:      Fear of Current or Ex-Partner:      Emotionally Abused:      Physically Abused:      Sexually Abused:        CURRENT MEDICATIONS:  Current Outpatient Medications   Medication Sig Dispense Refill     acetaminophen (TYLENOL) 325 MG tablet Take 650 mg by mouth every 4 hours as needed for mild pain or fever       alcohol swab prep pads Use to swab area of injection/jina as directed. 100 each 3     apixaban ANTICOAGULANT (ELIQUIS ANTICOAGULANT) 5 MG tablet Take 1 tablet (5 mg) by mouth 2 times daily 180 tablet 3     aspirin (ASA) 81 MG chewable tablet Take 81 mg by mouth daily        atorvastatin (LIPITOR) 20 MG tablet Take 1 tablet (20 mg) by mouth daily 90 tablet 0     [place]sa Protect (EUCERIN) external cream Apply topically 2 times daily as needed for dry skin or other Apply as needed to intact stage 1 pressure area and/or irritated skin, rash or excoriation       bisacodyl (DULCOLAX) 10 MG suppository Place 10 mg rectally daily as needed for constipation If no BM or small BM for 3 days/9 shifts       blood glucose (NO BRAND SPECIFIED) test strip Use to test blood sugar 4 times daily or as directed. 100 strip 6     blood glucose calibration (NO BRAND SPECIFIED)  solution 1 drop every 14 days Use 1 drop to calibrate blood glucose monitor every 14 days 1 Bottle 3     blood glucose monitoring (NO BRAND SPECIFIED) meter device kit Use to test blood sugar 4 times daily or as directed. 1 kit 0     cholecalciferol 1000 UNITS TABS Take 1,000 Units by mouth daily 30 tablet      citalopram (CELEXA) 20 MG tablet Take 20 mg by mouth every morning        digoxin (LANOXIN) 125 MCG tablet Take 1 tablet (125 mcg) by mouth daily 90 tablet 3     furosemide (LASIX) 20 MG tablet TAKE 1 TABLET(20 MG) BY MOUTH TWICE DAILY 180 tablet 0     guaiFENesin (ROBITUSSIN) 100 MG/5ML SYRP Take 10 mLs by mouth every 4 hours as needed for cough For cold symptoms and non productive cough (Patient not taking: Reported on 9/30/2021)       insulin glargine (LANTUS PEN) 100 UNIT/ML pen Inject 30 Units Subcutaneous At Bedtime       insulin lispro (HUMALOG KWIKPEN) 100 UNIT/ML (1 unit dial) KWIKPEN Inject 12 Units Subcutaneous 2 times daily (before meals) Breakfast and lunch       insulin lispro (HUMALOG KWIKPEN) 100 UNIT/ML (1 unit dial) KWIKPEN Inject 14 Units Subcutaneous daily (with dinner)       loperamide (IMODIUM A-D) 2 MG tablet Take 2-4 mg by mouth 4 times daily as needed for diarrhea Take 4 mg after 1st loose stool, then take 1 tablet as needed after each loose stool       magnesium hydroxide (MILK OF MAGNESIA) 400 MG/5ML suspension Take 30 mLs by mouth daily as needed for constipation or heartburn If no BM/small BM for 3 days/9 shifts       metFORMIN (GLUCOPHAGE-XR) 750 MG 24 hr tablet Take 750 mg by mouth every morning       metoprolol succinate ER (TOPROL-XL) 100 MG 24 hr tablet Take 1 tablet (100 mg) by mouth daily 90 tablet 3     neomycin-bacitracin-polymyxin (NEOSPORIN) 5-400-5000 ointment Apply 1 each topically 3 times daily as needed (for minor abrasions)       nitroglycerin (NITROSTAT) 0.4 MG sublingual tablet For chest pain place 1 tablet under the tongue every 5 minutes for 3 doses. If  symptoms persist 5 minutes after 1st dose call 911. 25 tablet      nystatin (MYCOSTATIN) 308265 UNIT/GM external powder Apply topically 2 times daily as needed (to affected area(s) as needed)       order for DME Equipment to be ordered:  Scale- Qty 1  Commander Flex - Qty. 1  Pulse-Oximeter - Qty. 1  Use as directed 1 Units 0     order for DME Equipment being ordered: DH2 shoe 1 Device 0     pantoprazole (PROTONIX) 40 MG EC tablet Take 40 mg by mouth daily       polyethylene glycol (MIRALAX) 17 GM/Dose powder Take 17 g by mouth daily as needed 510 g 0     sacubitril-valsartan (ENTRESTO)  MG per tablet Take 1 tablet by mouth 2 times daily 180 tablet 3     spironolactone (ALDACTONE) 25 MG tablet Take 0.5 tablets (12.5 mg) by mouth daily 15 tablet 0     thin (NO BRAND SPECIFIED) lancets 1 each 4 times daily Use with lanceting device. 100 each 1     traZODone (DESYREL) 50 MG tablet Take 50 mg by mouth At Bedtime       ULTICARE SHORT 31G X 8 MM insulin pen needle        venetoclax (VENCLEXTA) 100 MG tablet Take 200 mg by mouth daily         ROS:   10 point ROS negative except HPI    EXAM:  BP (!) 143/60 (BP Location: Left arm, Patient Position: Chair, Cuff Size: Adult Regular)   Pulse 75   Wt 87.2 kg (192 lb 3.2 oz)   SpO2 98%   BMI 27.36 kg/m    General: appears comfortable, alert and articulate  Head: normocephalic, atraumatic  Eyes: anicteric sclera, EOMI  Neck: no adenopathy  Orophyarynx: moist mucosa, no lesions, dentition intact  Heart: regular, S1/S2, no murmur, gallop, rub  Lungs: clear, no rales or wheezing  Abdomen: soft, non-tender, bowel sounds present, no hepatosplenomegaly  Extremities: no clubbing, cyanosis or edema, R BKA with prosthesis  Neurological: normal speech and affect, no gross motor deficits  Skin - rashes    Labs:    Chemistry panel: Recent Labs   Lab Test 11/01/21  1358 10/28/21  1116 09/25/21  0617 09/25/21  0555 09/24/21  0612 09/24/21  0451 09/23/21  0654 09/23/21  0508  09/22/21 2229 09/22/21 1957    134*   < > 136   < > 138   < > 138   < > 141   POTASSIUM 4.2 4.3   < > 3.9   < > 4.2   < > 3.7   < > 4.4   CHLORIDE 102 100   < > 105   < > 105   < > 105   < > 108*   CO2 24 23   < > 23   < > 24   < > 24   < > 22   ANIONGAP 9 11   < > 8   < > 9   < > 9   < > 11   * 351*   < > 190*   < > 146*   < > 184*   < > 61*   BUN 32* 31*   < > 26   < > 23   < > 18   < > 18   CR 1.46* 1.78*   < > 1.40*   < > 1.37*   < > 1.42*   < > 1.39*   JUSTINO 9.1 9.9   < > 9.0   < > 9.1   < > 8.9   < > 9.6   MAG  --   --   --  1.8  --  2.0   < > 1.6*   < > 1.8   GFRESTIMATED 45* 36*   < > 48*   < > 49*   < > 47*   < > 48*   AST  --   --   --   --   --   --   --  12  --  19   ALT  --   --   --   --   --   --   --  12  --  14    < > = values in this interval not displayed.       CBC:   Recent Labs   Lab Test 09/25/21  0555 09/24/21  0452   WBC 3.0* 3.5*   RBC 3.40* 3.36*   HGB 10.4* 10.1*   HCT 32.2* 31.3*   MCV 95 93   MCH 30.6 30.1   MCHC 32.3 32.3   RDW 16.9* 17.2*   PLT 99* 109*       Lipid Panel:  Recent Labs   Lab Test 06/21/21  0522 05/18/20  1117 04/09/18  0951 05/23/14  0745 01/10/14  0923 01/10/14  0923   CHOL 116 121   < > 134   < > 92   HDL 38* 36*   < > 34*   < > 28*   LDL 49 60   < > 48   < > 45   TRIG 145 123   < > 257*   < > 92   CHOLHDLRATIO  --   --   --  3.9  --  3.3    < > = values in this interval not displayed.       Thyroid:   TSH   Date Value Ref Range Status   10/03/2018 1.11 0.40 - 4.00 mU/L Final     No results found for: T4  Hemoglobin A1C   Date Value Ref Range Status   09/23/2021 7.6 (H) <=5.6 % Final     Comment:       Prediabetes: 5.7 to 6.4%        Diabetes:  >=6.5%     Patients with Hgb F >5%, total bilirubin >10.0 mg/dL, abnormal red cell turnover, severe renal or hepatic disease or malignancy should not have this A1C method used to diagnose or monitor diabetes.    06/20/2021 9.0 (H) 0 - 5.6 % Final     Comment:     Normal <5.7% Prediabetes 5.7-6.4%  Diabetes 6.5% or  higher - adopted from ADA   consensus guidelines.       INR   Date Value Ref Range Status   04/16/2021 1.05 0.86 - 1.14 Final         EKG Sept 2021 - sinus rhythm with first degree AVB, LAD      Echocardiograms:   June 2021    1. Left ventricular systolic function is severely reduced. The visual ejection  fraction is estimated at 20-25%.  2. The right ventricle is normal in structure, function and size.  3. The left atrium is moderately dilated.  4. The aortic valve is trileaflet with aortic valve sclerosis.  5. There is mild (1+) mitral regurgitation.    2/15/2018  Severe left ventricular dilation is present.  Biplane LV EF 20%.  Severe diffuse hypokinesis is present.  Mild to moderate right ventricular dilation is present.  Global right ventricular function is moderately to severely reduced.  Dilation of the inferior vena cava is present with abnormal respiratory  variation in diameter.  No pericardial effusion is present.  A left pleural effusion is present.    10/2015  Left ventricular function  Is moderately reduced, estimated at 35-40%.  There is mild-moderate global hypokinesia of the left ventricle.  Mildly decreased right ventricular systolic function    6/20/21  Interpretation Summary     1. Left ventricular systolic function is severely reduced. The visual ejection  fraction is estimated at 20-25%.  2. The right ventricle is normal in structure, function and size.  3. The left atrium is moderately dilated.  4. The aortic valve is trileaflet with aortic valve sclerosis.  5. There is mild (1+) mitral regurgitation.       [facility] 3/2018      [facility] 6/19/21    1.  Severe multivessel CAD s/p CABG.                LM has severe stenosis.                LAD has sever proximal stenosis and midLAD is occluded. Distal LAD fills via patent LIMA.                LCx is occluded.                 RCA has moderate diffuse disease.  2.  LIMA to LAD is patent.  Mid to distal LAD stents are patent.  3.  SVG to OM is patent.  4.   SVG to Diagonal is patent with a 70% stenosis and supplies diagonal branches are small and diffusely diseased.      Assessment and Plan:     Tad Mannnig is a 82 yo male with    HFrEF (Stage C, NYHA III)  CAD, s/p CABG L-LAD, SVG-OM1, SVG-D1 in 1995.   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  He has limited exercise tolerance but is able to ambulate without much symptoms using his walker and the prosthesis.  He has ischemic cardiomyopathy and chronic systolic heart failure with a LV ejection fraction around 25%. He has severe native CAD and disease in the SVG graft to the diagonal. On exam today he is euvolemic. His blood pressure is slightly elevated today and his lipids are at goal.  He is on guideline directed medical therapy including aspirin, atorvastatin, metoprolol, Entresto, spironolactone and digoxin. His most recent lipid panel is at goal. His renal function and electrolytes are stable. Dig level in June 2021 was 0.4.  I will increase entresto to  mg twice daily (from 49-51 BID) and check his renal function in 2 weeks. He would also likely benefit from adding empaglaflozin 10mg to his regimen but will come with an increased risk of urinary infection. I will defer starting this to his endocrinologist.       He is tolerating apixaban well without any bleeding issues with paroxysmal atrial fibrillation.  He appears to be in a regular rhythm on auscultation today.  September '21 EKG was notable for sinus rhythm.    Follow-up in [facility] in 1 month and with me in 6 months.     Clarisse Valdes MD, MS  Professor of Medicine  Cardiovascular division

## 2021-11-01 NOTE — NURSING NOTE
Chief Complaint   Patient presents with     Follow Up     Return with labs prior     Vitals were taken and medications reconciled.    Freddie Hernandes, EMT  2:40 PM

## 2021-11-01 NOTE — PATIENT INSTRUCTIONS
Cardiology Providers you saw during your visit:  Dr. Valdes     Medication changes: Increase Entresto to  mg Twice Daily    Follow up:     Labs in 2 weeks    Appointment with [facility] in 1 month with labs prior     Appointment with Dr. Valdes in 6 months    Follow the American Heart Association Diet and Lifestyle recommendations:  Limit saturated fat, trans fat, sodium, red meat, sweets and sugar-sweetened beverages. If you choose to eat red meat, compare labels and select the leanest cuts available.  Aim for at least 150 minutes of moderate physical activity or 75 minutes of vigorous physical activity - or an equal combination of both - each week.      If you have any questions, call  Lazaro Felton RN, at [phone].  Press Option #1 for the [facility], and then press Option #4  We are encouraging the use of [place]  to communicate with your HealthCare Provider      After hours, weekends or holidays: On Call Cardiologist- [phone] option #4 and ask to speak to the on-call Cardiologist.

## 2021-11-02 RX ORDER — SACUBITRIL AND VALSARTAN 97; 103 MG/1; MG/1
1 TABLET, FILM COATED ORAL 2 TIMES DAILY
Qty: 180 TABLET | Refills: 3 | Status: SHIPPED | OUTPATIENT
Start: 2021-11-02 | End: 2023-01-01

## 2021-11-03 ENCOUNTER — LAB REQUISITION (OUTPATIENT)
Dept: LAB | Facility: CLINIC | Age: 81
End: 2021-11-03
Payer: COMMERCIAL

## 2021-11-03 DIAGNOSIS — I50.9 HEART FAILURE, UNSPECIFIED (H): ICD-10-CM

## 2021-11-04 LAB
ANION GAP SERPL CALCULATED.3IONS-SCNC: 10 MMOL/L (ref 5–18)
BUN SERPL-MCNC: 33 MG/DL (ref 8–28)
CALCIUM SERPL-MCNC: 9.3 MG/DL (ref 8.5–10.5)
CHLORIDE BLD-SCNC: 100 MMOL/L (ref 98–107)
CO2 SERPL-SCNC: 24 MMOL/L (ref 22–31)
CREAT SERPL-MCNC: 1.97 MG/DL (ref 0.7–1.3)
GFR SERPL CREATININE-BSD FRML MDRD: 31 ML/MIN/1.73M2
GLUCOSE BLD-MCNC: 398 MG/DL (ref 70–125)
POTASSIUM BLD-SCNC: 4.4 MMOL/L (ref 3.5–5)
SODIUM SERPL-SCNC: 134 MMOL/L (ref 136–145)

## 2021-11-04 PROCEDURE — P9604 ONE-WAY ALLOW PRORATED TRIP: HCPCS | Mod: ORL | Performed by: INTERNAL MEDICINE

## 2021-11-04 PROCEDURE — 80048 BASIC METABOLIC PNL TOTAL CA: CPT | Mod: ORL | Performed by: INTERNAL MEDICINE

## 2021-11-04 PROCEDURE — 36415 COLL VENOUS BLD VENIPUNCTURE: CPT | Mod: ORL | Performed by: INTERNAL MEDICINE

## 2021-11-17 ENCOUNTER — TELEPHONE (OUTPATIENT)
Dept: CARDIOLOGY | Facility: CLINIC | Age: 81
End: 2021-11-17

## 2021-11-17 ENCOUNTER — MYC MEDICAL ADVICE (OUTPATIENT)
Dept: CARDIOLOGY | Facility: CLINIC | Age: 81
End: 2021-11-17
Payer: COMMERCIAL

## 2021-11-17 ENCOUNTER — LAB REQUISITION (OUTPATIENT)
Dept: LAB | Facility: CLINIC | Age: 81
End: 2021-11-17
Payer: COMMERCIAL

## 2021-11-17 DIAGNOSIS — I50.9 ACUTE ON CHRONIC CONGESTIVE HEART FAILURE, UNSPECIFIED HEART FAILURE TYPE (H): ICD-10-CM

## 2021-11-17 DIAGNOSIS — I50.22 CHRONIC SYSTOLIC HEART FAILURE (H): ICD-10-CM

## 2021-11-17 DIAGNOSIS — N39.0 URINARY TRACT INFECTION, SITE NOT SPECIFIED: ICD-10-CM

## 2021-11-17 LAB
ALBUMIN UR-MCNC: 100 MG/DL
APPEARANCE UR: CLEAR
BILIRUB UR QL STRIP: NEGATIVE
COLOR UR AUTO: ABNORMAL
GLUCOSE UR STRIP-MCNC: NEGATIVE MG/DL
HGB UR QL STRIP: ABNORMAL
KETONES UR STRIP-MCNC: NEGATIVE MG/DL
LEUKOCYTE ESTERASE UR QL STRIP: NEGATIVE
MUCOUS THREADS #/AREA URNS LPF: PRESENT /LPF
NITRATE UR QL: NEGATIVE
PH UR STRIP: 5.5 [PH] (ref 5–7)
RBC URINE: 1 /HPF
SP GR UR STRIP: 1.01 (ref 1–1.03)
SQUAMOUS EPITHELIAL: 1 /HPF
UROBILINOGEN UR STRIP-MCNC: <2 MG/DL
WBC URINE: 1 /HPF

## 2021-11-17 PROCEDURE — 81001 URINALYSIS AUTO W/SCOPE: CPT | Mod: ORL | Performed by: INTERNAL MEDICINE

## 2021-11-17 RX ORDER — FUROSEMIDE 40 MG
20 TABLET ORAL DAILY
Qty: 90 TABLET | Refills: 0 | Status: SHIPPED | OUTPATIENT
Start: 2021-11-17 | End: 2021-11-18

## 2021-11-17 NOTE — TELEPHONE ENCOUNTER
[facility] Call Center    Phone Message    May a detailed message be left on voicemail: yes     Reason for Call: Other: Dr. Valdes would like pt to be seen sooner than the current 12.13.21 apt scheduled with RENALDO JARRETT.  Nothing is available sooner through Epic.  Please call Joyce Manning daughter in law at [phone] to scheduled a sooner apt.       Action Taken: Message routed to:  [facility] ([facility]): Cardio    Travel Screening: Not Applicable

## 2021-11-17 NOTE — TELEPHONE ENCOUNTER
Date: 11/17/2021    Time of Call: 4:10 PM     Diagnosis:  Volume up     [ TORB ] Ordering provider: Dr. Valdes  Order: Increase lasix to 40 mg. Follow up with [facility] in 1-2 weeks.      Order received by: DARIUSZ Hutchins     Follow-up/additional notes: Pt notified via [employer] message.     Lazaro Felton RN   Cardiology Nurse Coordinator

## 2021-11-18 ENCOUNTER — TELEPHONE (OUTPATIENT)
Dept: CARDIOLOGY | Facility: CLINIC | Age: 81
End: 2021-11-18
Payer: COMMERCIAL

## 2021-11-18 RX ORDER — FUROSEMIDE 40 MG
40 TABLET ORAL DAILY
Qty: 90 TABLET | Refills: 0 | Status: SHIPPED | OUTPATIENT
Start: 2021-11-18 | End: 2022-01-27

## 2021-11-18 NOTE — TELEPHONE ENCOUNTER
Called Ana to discuss recent lasix dose increase to 40 mg. Orders faxed over to Ana.     Lazaro Felton RN   Cardiology Nurse Coordinator

## 2021-11-23 ENCOUNTER — OFFICE VISIT (OUTPATIENT)
Dept: PODIATRY | Facility: CLINIC | Age: 81
End: 2021-11-23
Payer: COMMERCIAL

## 2021-11-23 VITALS
RESPIRATION RATE: 12 BRPM | TEMPERATURE: 97.5 F | HEART RATE: 64 BPM | SYSTOLIC BLOOD PRESSURE: 140 MMHG | DIASTOLIC BLOOD PRESSURE: 60 MMHG

## 2021-11-23 DIAGNOSIS — N18.32 TYPE 2 DIABETES MELLITUS WITH STAGE 3B CHRONIC KIDNEY DISEASE, UNSPECIFIED WHETHER LONG TERM INSULIN USE (H): ICD-10-CM

## 2021-11-23 DIAGNOSIS — E11.22 TYPE 2 DIABETES MELLITUS WITH STAGE 3B CHRONIC KIDNEY DISEASE, UNSPECIFIED WHETHER LONG TERM INSULIN USE (H): ICD-10-CM

## 2021-11-23 DIAGNOSIS — I50.9 ACUTE ON CHRONIC CONGESTIVE HEART FAILURE, UNSPECIFIED HEART FAILURE TYPE (H): ICD-10-CM

## 2021-11-23 DIAGNOSIS — J96.01 ACUTE RESPIRATORY FAILURE WITH HYPOXIA (H): Primary | ICD-10-CM

## 2021-11-23 DIAGNOSIS — B35.1 ONYCHOMYCOSIS: Primary | ICD-10-CM

## 2021-11-23 DIAGNOSIS — I73.9 PAD (PERIPHERAL ARTERY DISEASE) (H): ICD-10-CM

## 2021-11-23 PROCEDURE — 99213 OFFICE O/P EST LOW 20 MIN: CPT | Mod: 25 | Performed by: PODIATRIST

## 2021-11-23 PROCEDURE — 11720 DEBRIDE NAIL 1-5: CPT | Mod: Q7 | Performed by: PODIATRIST

## 2021-11-23 NOTE — PROGRESS NOTES
FOOT AND ANKLE SURGERY/PODIATRY CONSULT NOTE         ASSESSMENT:   Maicol  Onychomycosis  PAD  DM2      TREATMENT:  -I discussed topical and oral anti-fungal medication today including possible elevated LFT's with oral medication. Based on the patient's PMH, I do not recommend oral anti-fungal medication and instead recommend regular debridement of the nails.    -I have referred him to the nursing nail trimming service here at [place].     -I debrided nails less than 6 in length and thickness left foot.     -Also referred to [facility] for diabetic shoes and inserts.     -Patient's questions invited and answered. He was encouraged to call my office with any further questions or concerns.     Hans Dial DPM  [facility]/Foot & Ankle Surgery  [phone]      HPI: I was asked to see Tad Manning today for concerns related to fungal nails. The patient states that his nails get caught on his socks and has not had his nails trimmed in a long time. He also would like to discuss referral for diabetic shoes.       Past Medical History:   Diagnosis Date     ACS (acute coronary syndrome) (H) 6/20/2021     Acute respiratory failure with hypoxia (H) 4/6/2017     STORM (acute kidney injury) (H) 9/22/2018     Arthritis      ASCVD (arteriosclerotic cardiovascular disease)      Atrial fibrillation (H) 2/5/2018     Atrial flutter (H) 2/22/2017     BMI 30.0-30.9,adult      BPH (benign prostatic hypertrophy)      CAD S/P percutaneous coronary angioplasty 1/11/2014     Cellulitis      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)      Chronic systolic heart failure (H) 6/6/2017     Confusion 6/20/2021     Coronary artery disease     triple bypass 1995     CRF (chronic renal failure), stage 3 (moderate) (H) 1/20/2017     Critical lower limb ischemia (H) 1/10/2014     Diabetes mellitus (H)      Diabetes mellitus, type 2 (H) 1/11/2014     Diabetic polyneuropathy (H)       Discitis 2017     Epidural abscess 2017     Fall 2017     History of blood transfusion      HTN (hypertension) 2014     Hyperglycemia 2020     Hyperlipidaemia      Hyperlipidemia with target LDL less than 100 2014    Formatting of this note might be different from the original. Overview:  Diagnosis updated by automated process. Provider to review and confirm. Formatting of this note might be different from the original. Diagnosis updated by automated process. Provider to review and confirm.     Hypertension      Long term current use of anticoagulant therapy 2018     Non-healing ulcer of foot (H)     Right     Noninflammatory pericardial effusion      Osteomyelitis (H) 2014     Osteomyelitis of foot, right, acute (H) 2/15/2017     Osteomyelitis of left foot (H)      PVD (peripheral vascular disease) (H)      Recurrent falls 2021     Sebaceous cyst          Social History     Socioeconomic History     Marital status: Single     Spouse name: Not on file     Number of children: Not on file     Years of education: Not on file     Highest education level: Not on file   Occupational History     Not on file   Tobacco Use     Smoking status: Former Smoker     Packs/day: 2.00     Years: 30.00     Pack years: 60.00     Types: Cigarettes     Quit date: 1995     Years since quittin.9     Smokeless tobacco: Never Used   Substance and Sexual Activity     Alcohol use: No     Drug use: No     Sexual activity: Not on file   Other Topics Concern     Parent/sibling w/ CABG, MI or angioplasty before 65F 55M? Not Asked   Social History Narrative    Lives independently with SO. 2017      Social Determinants of Health     Financial Resource Strain: Not on file   Food Insecurity: Not on file   Transportation Needs: Not on file   Physical Activity: Not on file   Stress: Not on file   Social Connections: Not on file   Intimate Partner Violence: Not on file   Housing Stability: Not on file             Allergies   Allergen Reactions     Blood Transfusion Related (Informational Only) Other (See Comments)     Patient has a history of a clinically significant antibody against RBC antigens.  A delay in compatible RBCs may occur.      Blood-Group Specific Substance Other (See Comments)     Patient has a suggestive Warm Auto-antibody. Blood products may be delayed. Draw patient 24 hours prior to transfusion. Draw one red top and two purple top tubes for all type and screen orders.         MEDICATIONS:   Current Outpatient Medications   Medication     acetaminophen (TYLENOL) 325 MG tablet     alcohol swab prep pads     apixaban ANTICOAGULANT (ELIQUIS ANTICOAGULANT) 5 MG tablet     aspirin (ASA) 81 MG chewable tablet     atorvastatin (LIPITOR) 20 MG tablet     Mahsa Protect (EUCERIN) external cream     bisacodyl (DULCOLAX) 10 MG suppository     blood glucose (NO BRAND SPECIFIED) test strip     blood glucose calibration (NO BRAND SPECIFIED) solution     blood glucose monitoring (NO BRAND SPECIFIED) meter device kit     cholecalciferol 1000 UNITS TABS     citalopram (CELEXA) 20 MG tablet     digoxin (LANOXIN) 125 MCG tablet     furosemide (LASIX) 40 MG tablet     guaiFENesin (ROBITUSSIN) 100 MG/5ML SYRP     insulin glargine (LANTUS PEN) 100 UNIT/ML pen     insulin lispro (HUMALOG KWIKPEN) 100 UNIT/ML (1 unit dial) KWIKPEN     insulin lispro (HUMALOG KWIKPEN) 100 UNIT/ML (1 unit dial) KWIKPEN     loperamide (IMODIUM A-D) 2 MG tablet     magnesium hydroxide (MILK OF MAGNESIA) 400 MG/5ML suspension     metFORMIN (GLUCOPHAGE-XR) 750 MG 24 hr tablet     metoprolol succinate ER (TOPROL-XL) 100 MG 24 hr tablet     neomycin-bacitracin-polymyxin (NEOSPORIN) 5-400-5000 ointment     nitroglycerin (NITROSTAT) 0.4 MG sublingual tablet     nystatin (MYCOSTATIN) 326040 UNIT/GM external powder     order for DME     order for DME     pantoprazole (PROTONIX) 40 MG EC tablet     polyethylene glycol (MIRALAX) 17 GM/Dose powder      sacubitril-valsartan (ENTRESTO)  MG per tablet     spironolactone (ALDACTONE) 25 MG tablet     thin (NO BRAND SPECIFIED) lancets     traZODone (DESYREL) 50 MG tablet     ULTICARE SHORT 31G X 8 MM insulin pen needle     venetoclax (VENCLEXTA) 100 MG tablet     No current facility-administered medications for this visit.        Family History   Problem Relation Age of Onset     Cancer Mother         leukemia     Leukemia Mother           Review of Systems - 10 point Review of Systems is negative except for nail fungus which is noted in HPI.    OBJECTIVE:  Appearance: alert, well appearing, and in no distress.    VITAL SIGNS: BP (!) 140/60   Pulse 64   Temp 97.5  F (36.4  C) (Oral)   Resp 12       General appearance: Patient is alert and fully cooperative with history & exam.  No sign of distress is noted during the visit.     Psychiatric: Affect is pleasant & appropriate.  Patient appears motivated to improve health.     Respiratory: Breathing is regular & unlabored while sitting.     HEENT: Hearing is intact to spoken word.  Speech is clear.  No gross evidence of visual impairment that would impact ambulation.      Vascular: Dorsalis pedis and posterior tibial pulses are non-palpable left. There is no appreciable edema noted.  Dermatologic: Nails 1,3,4,5 left elongated, thick, yellow with subungal debris. Trophic changes noted including diminished hair growth and shiny skin.  Neurologic: Diminished to light touch left foot.  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 Right BKA. Contracted digits left foot.

## 2021-11-23 NOTE — PATIENT INSTRUCTIONS
Please call one of the [place] locations below to schedule an appointment. If you received a prescription please bring it with you to your appointment. Some locations are limited to what they carry.    Office Locations    [facility]  [address] [place] [address]  Home Medical Equipment, [address]   Phone: [phone]   Orthotics and Prosthetics, [address]   Phone: [phone]    [facility]  Home Medical Equipment  [address], [address], [place] [address]   Phone: [phone]    Orthotics and Prosthetics ([facility])    [address], [address], [place] [address]  Phone: [phone]    [facility]  [address]  [address]   [place] [address]   Phone: [phone]    [facility]  [address]  [place] [address]  Phone: [phone]    [facility]   [address]  [place] [address]  Phone: [phone]    [facility]  [address]  [place] [address]  Phone: [phone]    [facility]  [address]  [address]  [place] [address]  Phone: [phone]    [place]   [address] [place] [address]   Phone: [phone]

## 2021-11-23 NOTE — LETTER
11/23/2021         RE: Tad Manning  [address] [place] [id number]        Dear Colleague,    Thank you for referring your patient, Tad Manning, to the [facility]. Please see a copy of my visit note below.          FOOT AND ANKLE SURGERY/PODIATRY CONSULT NOTE         ASSESSMENT:   [place]  Onychomycosis  PAD  DM2      TREATMENT:  -I discussed topical and oral anti-fungal medication today including possible elevated LFT's with oral medication. Based on the patient's PMH, I do not recommend oral anti-fungal medication and instead recommend regular debridement of the nails.    -I have referred him to the nursing nail trimming service here at [place].     -I debrided nails less than 6 in length and thickness left foot.     -Also referred to [facility] for diabetic shoes and inserts.     -Patient's questions invited and answered. He was encouraged to call my office with any further questions or concerns.     Hans Dial DPM  [facility] Podiatry/Foot & Ankle Surgery  [phone]      HPI: I was asked to see Tad Manning today for concerns related to fungal nails. The patient states that his nails get caught on his socks and has not had his nails trimmed in a long time. He also would like to discuss referral for diabetic shoes.       Past Medical History:   Diagnosis Date     ACS (acute coronary syndrome) (H) 6/20/2021     Acute respiratory failure with hypoxia (H) 4/6/2017     STORM (acute kidney injury) (H) 9/22/2018     Arthritis      ASCVD (arteriosclerotic cardiovascular disease)      Atrial fibrillation (H) 2/5/2018     Atrial flutter (H) 2/22/2017     BMI 30.0-30.9,adult      BPH (benign prostatic hypertrophy)      CAD S/P percutaneous coronary angioplasty 1/11/2014     Cellulitis      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)      Chronic systolic heart failure (H) 6/6/2017     Confusion  2021     Coronary artery disease     triple bypass      CRF (chronic renal failure), stage 3 (moderate) (H) 2017     Critical lower limb ischemia (H) 1/10/2014     Diabetes mellitus (H)      Diabetes mellitus, type 2 (H) 2014     Diabetic polyneuropathy (H)      Discitis 2017     Epidural abscess 2017     Fall 2017     History of blood transfusion      HTN (hypertension) 2014     Hyperglycemia 2020     Hyperlipidaemia      Hyperlipidemia with target LDL less than 100 2014    Formatting of this note might be different from the original. Overview:  Diagnosis updated by automated process. Provider to review and confirm. Formatting of this note might be different from the original. Diagnosis updated by automated process. Provider to review and confirm.     Hypertension      Long term current use of anticoagulant therapy 2018     Non-healing ulcer of foot (H)     Right     Noninflammatory pericardial effusion      Osteomyelitis (H) 2014     Osteomyelitis of foot, right, acute (H) 2/15/2017     Osteomyelitis of left foot (H)      PVD (peripheral vascular disease) (H)      Recurrent falls 2021     Sebaceous cyst          Social History     Socioeconomic History     Marital status: Single     Spouse name: Not on file     Number of children: Not on file     Years of education: Not on file     Highest education level: Not on file   Occupational History     Not on file   Tobacco Use     Smoking status: Former Smoker     Packs/day: 2.00     Years: 30.00     Pack years: 60.00     Types: Cigarettes     Quit date: 1995     Years since quittin.9     Smokeless tobacco: Never Used   Substance and Sexual Activity     Alcohol use: No     Drug use: No     Sexual activity: Not on file   Other Topics Concern     Parent/sibling w/ CABG, MI or angioplasty before 65F 55M? Not Asked   Social History Narrative    Lives independently with SO. 2017      Social Determinants  of Health     Financial Resource Strain: Not on file   Food Insecurity: Not on file   Transportation Needs: Not on file   Physical Activity: Not on file   Stress: Not on file   Social Connections: Not on file   Intimate Partner Violence: Not on file   Housing Stability: Not on file            Allergies   Allergen Reactions     Blood Transfusion Related (Informational Only) Other (See Comments)     Patient has a history of a clinically significant antibody against RBC antigens.  A delay in compatible RBCs may occur.      Blood-Group Specific Substance Other (See Comments)     Patient has a suggestive Warm Auto-antibody. Blood products may be delayed. Draw patient 24 hours prior to transfusion. Draw one red top and two purple top tubes for all type and screen orders.         MEDICATIONS:   Current Outpatient Medications   Medication     acetaminophen (TYLENOL) 325 MG tablet     alcohol swab prep pads     apixaban ANTICOAGULANT (ELIQUIS ANTICOAGULANT) 5 MG tablet     aspirin (ASA) 81 MG chewable tablet     atorvastatin (LIPITOR) 20 MG tablet     Mahsa Protect (EUCERIN) external cream     bisacodyl (DULCOLAX) 10 MG suppository     blood glucose (NO BRAND SPECIFIED) test strip     blood glucose calibration (NO BRAND SPECIFIED) solution     blood glucose monitoring (NO BRAND SPECIFIED) meter device kit     cholecalciferol 1000 UNITS TABS     citalopram (CELEXA) 20 MG tablet     digoxin (LANOXIN) 125 MCG tablet     furosemide (LASIX) 40 MG tablet     guaiFENesin (ROBITUSSIN) 100 MG/5ML SYRP     insulin glargine (LANTUS PEN) 100 UNIT/ML pen     insulin lispro (HUMALOG KWIKPEN) 100 UNIT/ML (1 unit dial) KWIKPEN     insulin lispro (HUMALOG KWIKPEN) 100 UNIT/ML (1 unit dial) KWIKPEN     loperamide (IMODIUM A-D) 2 MG tablet     magnesium hydroxide (MILK OF MAGNESIA) 400 MG/5ML suspension     metFORMIN (GLUCOPHAGE-XR) 750 MG 24 hr tablet     metoprolol succinate ER (TOPROL-XL) 100 MG 24 hr tablet     neomycin-bacitracin-polymyxin  (NEOSPORIN) 5-400-5000 ointment     nitroglycerin (NITROSTAT) 0.4 MG sublingual tablet     nystatin (MYCOSTATIN) 631849 UNIT/GM external powder     order for DME     order for DME     pantoprazole (PROTONIX) 40 MG EC tablet     polyethylene glycol (MIRALAX) 17 GM/Dose powder     sacubitril-valsartan (ENTRESTO)  MG per tablet     spironolactone (ALDACTONE) 25 MG tablet     thin (NO BRAND SPECIFIED) lancets     traZODone (DESYREL) 50 MG tablet     ULTICARE SHORT 31G X 8 MM insulin pen needle     venetoclax (VENCLEXTA) 100 MG tablet     No current facility-administered medications for this visit.        Family History   Problem Relation Age of Onset     Cancer Mother         leukemia     Leukemia Mother           Review of Systems - 10 point Review of Systems is negative except for nail fungus which is noted in HPI.    OBJECTIVE:  Appearance: alert, well appearing, and in no distress.    VITAL SIGNS: BP (!) 140/60   Pulse 64   Temp 97.5  F (36.4  C) (Oral)   Resp 12       General appearance: Patient is alert and fully cooperative with history & exam.  No sign of distress is noted during the visit.     Psychiatric: Affect is pleasant & appropriate.  Patient appears motivated to improve health.     Respiratory: Breathing is regular & unlabored while sitting.     HEENT: Hearing is intact to spoken word.  Speech is clear.  No gross evidence of visual impairment that would impact ambulation.      Vascular: Dorsalis pedis and posterior tibial pulses are non-palpable left. There is no appreciable edema noted.  Dermatologic: Nails 1,3,4,5 left elongated, thick, yellow with subungal debris. Trophic changes noted including diminished hair growth and shiny skin.  Neurologic: Diminished to light touch left foot.  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 Right BKA. Contracted digits left foot.             Again, thank you for allowing me to participate in the care of your patient.        Sincerely,        Hans  Surya Dial DPM

## 2021-12-03 ENCOUNTER — OFFICE VISIT (OUTPATIENT)
Dept: CARDIOLOGY | Facility: CLINIC | Age: 81
End: 2021-12-03
Attending: NURSE PRACTITIONER
Payer: COMMERCIAL

## 2021-12-03 ENCOUNTER — LAB (OUTPATIENT)
Dept: LAB | Facility: CLINIC | Age: 81
End: 2021-12-03
Payer: COMMERCIAL

## 2021-12-03 VITALS
BODY MASS INDEX: 26.33 KG/M2 | DIASTOLIC BLOOD PRESSURE: 60 MMHG | HEIGHT: 70 IN | SYSTOLIC BLOOD PRESSURE: 130 MMHG | WEIGHT: 183.9 LBS | HEART RATE: 74 BPM | OXYGEN SATURATION: 99 %

## 2021-12-03 DIAGNOSIS — I25.5 ISCHEMIC CARDIOMYOPATHY: ICD-10-CM

## 2021-12-03 DIAGNOSIS — I50.22 CHRONIC SYSTOLIC HEART FAILURE (H): Primary | ICD-10-CM

## 2021-12-03 DIAGNOSIS — I70.229 CRITICAL LOWER LIMB ISCHEMIA (H): ICD-10-CM

## 2021-12-03 DIAGNOSIS — G47.00 INSOMNIA, UNSPECIFIED TYPE: ICD-10-CM

## 2021-12-03 DIAGNOSIS — I50.9 ACUTE ON CHRONIC CONGESTIVE HEART FAILURE, UNSPECIFIED HEART FAILURE TYPE (H): ICD-10-CM

## 2021-12-03 DIAGNOSIS — E11.22 TYPE 2 DIABETES MELLITUS WITH STAGE 3B CHRONIC KIDNEY DISEASE, UNSPECIFIED WHETHER LONG TERM INSULIN USE (H): ICD-10-CM

## 2021-12-03 DIAGNOSIS — N18.32 TYPE 2 DIABETES MELLITUS WITH STAGE 3B CHRONIC KIDNEY DISEASE, UNSPECIFIED WHETHER LONG TERM INSULIN USE (H): ICD-10-CM

## 2021-12-03 DIAGNOSIS — I10 ESSENTIAL HYPERTENSION: ICD-10-CM

## 2021-12-03 LAB
ANION GAP SERPL CALCULATED.3IONS-SCNC: 1 MMOL/L (ref 3–14)
BUN SERPL-MCNC: 27 MG/DL (ref 7–30)
CALCIUM SERPL-MCNC: 9.1 MG/DL (ref 8.5–10.1)
CHLORIDE BLD-SCNC: 110 MMOL/L (ref 94–109)
CO2 SERPL-SCNC: 27 MMOL/L (ref 20–32)
CREAT SERPL-MCNC: 1.7 MG/DL (ref 0.66–1.25)
GFR SERPL CREATININE-BSD FRML MDRD: 38 ML/MIN/1.73M2
GLUCOSE BLD-MCNC: 79 MG/DL (ref 70–99)
POTASSIUM BLD-SCNC: 4 MMOL/L (ref 3.4–5.3)
SODIUM SERPL-SCNC: 138 MMOL/L (ref 133–144)

## 2021-12-03 PROCEDURE — 99213 OFFICE O/P EST LOW 20 MIN: CPT | Performed by: NURSE PRACTITIONER

## 2021-12-03 PROCEDURE — G0463 HOSPITAL OUTPT CLINIC VISIT: HCPCS

## 2021-12-03 PROCEDURE — 80048 BASIC METABOLIC PNL TOTAL CA: CPT | Performed by: PATHOLOGY

## 2021-12-03 PROCEDURE — 36415 COLL VENOUS BLD VENIPUNCTURE: CPT | Performed by: PATHOLOGY

## 2021-12-03 RX ORDER — SPIRONOLACTONE 25 MG/1
25 TABLET ORAL DAILY
Qty: 90 TABLET | Refills: 3 | Status: SHIPPED | OUTPATIENT
Start: 2021-12-03 | End: 2023-01-01

## 2021-12-03 RX ORDER — SPIRONOLACTONE 25 MG/1
25 TABLET ORAL DAILY
Qty: 15 TABLET | Refills: 0 | Status: SHIPPED | OUTPATIENT
Start: 2021-12-03 | End: 2021-12-03

## 2021-12-03 ASSESSMENT — PAIN SCALES - GENERAL: PAINLEVEL: NO PAIN (0)

## 2021-12-03 ASSESSMENT — MIFFLIN-ST. JEOR: SCORE: 1562.9

## 2021-12-03 NOTE — PATIENT INSTRUCTIONS
Take your medicines every day, as directed    Changes made today:  o Take an extra dose of 40 mg lasix for 2 days  o Increase spironolactone to 25 mg daily   Monitor Your Weight and Symptoms    Contact us if you:      Gain 2 pounds in one day or 5 pounds in one week    Feel more short of breath    Notice more leg swelling    Feel lightheadeded   Change your lifestyle    Limit Salt or Sodium:    2000 mg  Limit Fluids:    2000 mL or approximately 64 ounces  Eat a Heart Healthy Diet    Low in saturated fats  Stay Active:    Aim to move at least 150 minutes every  week         To Contact us    During Business Hours:  [phone], option # 1 (University)  Then option # 4 (medical questions)     After hours, weekends or holidays:   [phone], Option #4  Ask to speak to the On-Call Cardiologist. Inform them you are a CORE/heart failure patient at the [facility].     Use [employer] allows you to communicate directly with your heart team through secure messaging.    [employer] can be accessed any time on your phone, computer, or tablet.    If you need assistance, we'd be happy to help!         Keep your Heart Appointments:     Have labs drawn in 2 weeks. Follow up with [facility] clinic on January 3rd with labs prior.

## 2021-12-03 NOTE — PROGRESS NOTES
In person appointment    HPI:   Mr. Manning is a 81 year old male with a past medical history including CLL in remission, CAD s/p CABG in 1995, HFrEF 2/2 ICM, atrial flutter, PVD s/p iliac artery stents and left lower extremity bypass, hypertension, diabetes, hyperlipidemia, prosthesis on leg for about 2-3 years now. discitis and multple admission for decompensated heart failure. Presents to clinic for CORE f/u.    He was admitted from 9/22-9/25. He was diuresed from 190 lbs to 179. His entresto was increased. Aldactone was added. His lasix was increased from 10 mg daily to 40 mg daily.     Recently moved to [facility] living facility) in [place]. He is feeling better today.  Weights weekly 190.4  No SOB at rest. Walking from his apartment down to meals is about 1.5 blocks and he feels he can tolerated not winded.  LE edema is there, but about as good as it gets. Prosthesis on r leg. No abdominal edema. He denies orthopnea, PND. He gets some lightheadedness right when he stands. No palpitations or syncope. No chest pain.        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)      Chronic systolic heart failure (H) 6/6/2017     Confusion 6/20/2021     Coronary artery disease     triple bypass 1995     CRF (chronic renal failure), stage 3 (moderate) (H) 1/20/2017     Critical lower limb ischemia (H) 1/10/2014     Diabetes mellitus (H)      Diabetes mellitus, type 2 (H) 1/11/2014     Diabetic  polyneuropathy (H)      Discitis 2017     Epidural abscess 2017     Fall 2017     History of blood transfusion      HTN (hypertension) 2014     Hyperglycemia 2020     Hyperlipidaemia      Hyperlipidemia with target LDL less than 100 2014    Formatting of this note might be different from the original. Overview:  Diagnosis updated by automated process. Provider to review and confirm. Formatting of this note might be different from the original. Diagnosis updated by automated process. Provider to review and confirm.     Hypertension      Long term current use of anticoagulant therapy 2018     Non-healing ulcer of foot (H)     Right     Noninflammatory pericardial effusion      Osteomyelitis (H) 2014     Osteomyelitis of foot, right, acute (H) 2/15/2017     Osteomyelitis of left foot (H)      PVD (peripheral vascular disease) (H)      Recurrent falls 2021     Sebaceous cyst        FAMILY HISTORY:  Family History   Problem Relation Age of Onset     Cancer Mother         leukemia     Leukemia Mother        SOCIAL HISTORY:  Socioeconomic History     Marital status: Single   Tobacco Use     Smoking status: Former Smoker     Packs/day: 2.00     Years: 30.00     Pack years: 60.00     Types: Cigarettes     Last attempt to quit: 1995     Years since quittin.5     Smokeless tobacco: Never Used   Other Topics Concern     Parent/sibling w/ CABG, MI or angioplasty before 65F 55M? Not Asked   Social History Narrative    Lives independently with SO. 2017        CURRENT MEDICATIONS:  acetaminophen (TYLENOL) 325 MG tablet, Take 650 mg by mouth every 4 hours as needed for mild pain or fever  alcohol swab prep pads, Use to swab area of injection/jina as directed.  apixaban ANTICOAGULANT (ELIQUIS ANTICOAGULANT) 5 MG tablet, Take 1 tablet (5 mg) by mouth 2 times daily  aspirin (ASA) 81 MG chewable tablet, Take 81 mg by mouth daily   atorvastatin (LIPITOR) 20 MG tablet, Take 1 tablet  (20 mg) by mouth daily  Mahsa Protect (EUCERIN) external cream, Apply topically 2 times daily as needed for dry skin or other Apply as needed to intact stage 1 pressure area and/or irritated skin, rash or excoriation  bisacodyl (DULCOLAX) 10 MG suppository, Place 10 mg rectally daily as needed for constipation If no BM or small BM for 3 days/9 shifts  blood glucose (NO BRAND SPECIFIED) test strip, Use to test blood sugar 4 times daily or as directed.  blood glucose calibration (NO BRAND SPECIFIED) solution, 1 drop every 14 days Use 1 drop to calibrate blood glucose monitor every 14 days  blood glucose monitoring (NO BRAND SPECIFIED) meter device kit, Use to test blood sugar 4 times daily or as directed.  cholecalciferol 1000 UNITS TABS, Take 1,000 Units by mouth daily  citalopram (CELEXA) 20 MG tablet, Take 20 mg by mouth every morning   digoxin (LANOXIN) 125 MCG tablet, Take 1 tablet (125 mcg) by mouth daily  furosemide (LASIX) 40 MG tablet, Take 1 tablet (40 mg) by mouth daily  insulin glargine (LANTUS PEN) 100 UNIT/ML pen, Inject 30 Units Subcutaneous At Bedtime  insulin lispro (HUMALOG KWIKPEN) 100 UNIT/ML (1 unit dial) KWIKPEN, Inject 12 Units Subcutaneous 2 times daily (before meals) Breakfast and lunch  insulin lispro (HUMALOG KWIKPEN) 100 UNIT/ML (1 unit dial) KWIKPEN, Inject 14 Units Subcutaneous daily (with dinner)  loperamide (IMODIUM A-D) 2 MG tablet, Take 2-4 mg by mouth 4 times daily as needed for diarrhea Take 4 mg after 1st loose stool, then take 1 tablet as needed after each loose stool  magnesium hydroxide (MILK OF MAGNESIA) 400 MG/5ML suspension, Take 30 mLs by mouth daily as needed for constipation or heartburn If no BM/small BM for 3 days/9 shifts  metFORMIN (GLUCOPHAGE-XR) 750 MG 24 hr tablet, Take 750 mg by mouth every morning  metoprolol succinate ER (TOPROL-XL) 100 MG 24 hr tablet, Take 1 tablet (100 mg) by mouth daily  neomycin-bacitracin-polymyxin (NEOSPORIN) 5-400-5000 ointment, Apply 1  "each topically 3 times daily as needed (for minor abrasions)   nitroglycerin (NITROSTAT) 0.4 MG sublingual tablet, For chest pain place 1 tablet under the tongue every 5 minutes for 3 doses. If symptoms persist 5 minutes after 1st dose call 911.  nystatin (MYCOSTATIN) 231947 UNIT/GM external powder, Apply topically 2 times daily as needed (to affected area(s) as needed)  order for DME, Equipment to be ordered:  Scale- Qty 1  Commander Flex - Qty. 1  Pulse-Oximeter - Qty. 1  Use as directed  order for DME, Equipment being ordered: DH2 shoe  pantoprazole (PROTONIX) 40 MG EC tablet, Take 40 mg by mouth daily  polyethylene glycol (MIRALAX) 17 GM/Dose powder, Take 17 g by mouth daily as needed  sacubitril-valsartan (ENTRESTO)  MG per tablet, Take 1 tablet by mouth 2 times daily  spironolactone (ALDACTONE) 25 MG tablet, Take 0.5 tablets (12.5 mg) by mouth daily  thin (NO BRAND SPECIFIED) lancets, 1 each 4 times daily Use with lanceting device.  traZODone (DESYREL) 50 MG tablet, Take 50 mg by mouth At Bedtime  ULTICARE SHORT 31G X 8 MM insulin pen needle,   venetoclax (VENCLEXTA) 100 MG tablet, Take 200 mg by mouth daily  guaiFENesin (ROBITUSSIN) 100 MG/5ML SYRP, Take 10 mLs by mouth every 4 hours as needed for cough For cold symptoms and non productive cough  (Patient not taking: Reported on 12/3/2021)    No current facility-administered medications on file prior to visit.      ROS:   See HPI    EXAM:   /60 (BP Location: Right arm, Patient Position: Chair, Cuff Size: Adult Regular)   Pulse 74   Ht 1.79 m (5' 10.47\")   Wt 83.4 kg (183 lb 14.4 oz)   SpO2 99%   BMI 26.03 kg/m       GENERAL: Appears comfortable, in no acute distress.   HEENT: Eye symmetrical, no discharge or icterus bilaterally. Mucous membranes moist and without lesions.  CV: RRR, +S1S2, no murmur, rub, or gallop. JVP just above clavicle at 90 degrees.   RESPIRATORY: Respirations regular, even, and unlabored. Lungs CTA throughout.   GI: Soft " and non distended with normoactive bowel sounds present in all quadrants. No tenderness, rebound, guarding. No hepatomegaly.   EXTREMITIES: AKA of the right leg. Left leg with moderate edema. Left leg is warm and well perfused.  NEUROLOGIC: Alert and interacting appropriately. No focal deficits.   MUSCULOSKELETAL: No joint swelling or tenderness.   SKIN: No jaundice. No rashes or lesions.     Labs, reviewed with patient in clinic today:  CBC RESULTS:  Lab Results   Component Value Date    WBC 3.0 (L) 09/25/2021    WBC 3.7 (L) 06/26/2021    RBC 3.40 (L) 09/25/2021    RBC 3.09 (L) 06/26/2021    HGB 10.4 (L) 09/25/2021    HGB 9.6 (L) 06/26/2021    HCT 32.2 (L) 09/25/2021    HCT 28.6 (L) 06/26/2021    MCV 95 09/25/2021    MCV 93 06/26/2021    MCH 30.6 09/25/2021    MCH 31.1 06/26/2021    MCHC 32.3 09/25/2021    MCHC 33.6 06/26/2021    RDW 16.9 (H) 09/25/2021    RDW 14.6 06/26/2021    PLT 99 (L) 09/25/2021     (L) 06/26/2021       CMP RESULTS:  Lab Results   Component Value Date     12/03/2021     06/28/2021    POTASSIUM 4.0 12/03/2021    POTASSIUM 4.1 06/28/2021    CHLORIDE 110 (H) 12/03/2021    CHLORIDE 109 06/28/2021    CO2 27 12/03/2021    CO2 24 06/28/2021    ANIONGAP 1 (L) 12/03/2021    ANIONGAP 4 06/28/2021    GLC 79 12/03/2021     (H) 06/28/2021    BUN 27 12/03/2021    BUN 43 (H) 06/28/2021    CR 1.70 (H) 12/03/2021    CR 1.33 (H) 06/28/2021    GFRESTIMATED 38 (L) 12/03/2021    GFRESTIMATED >60 07/08/2021    GFRESTIMATED 51 (L) 06/28/2021    GFRESTBLACK >60 07/08/2021    GFRESTBLACK 59 (L) 06/28/2021    JUSTINO 9.1 12/03/2021    JUSTINO 9.4 06/28/2021    BILITOTAL 0.8 09/23/2021    BILITOTAL 0.9 06/20/2021    ALBUMIN 3.0 (L) 09/23/2021    ALBUMIN 3.3 (L) 06/20/2021    ALKPHOS 84 09/23/2021    ALKPHOS 72 06/20/2021    ALT 12 09/23/2021    ALT 24 06/20/2021    AST 12 09/23/2021    AST 34 06/20/2021        INR RESULTS:  Lab Results   Component Value Date    INR 1.05 04/16/2021       Lab Results    Component Value Date    MAG 1.8 09/25/2021    MAG 2.0 06/23/2021     Lab Results   Component Value Date    NTBNPI 7,364 (H) 06/20/2021     Lab Results   Component Value Date    NTBNP 22,172 (H) 02/09/2018       Diagnostics:  TTE 6/20/21  1. Left ventricular systolic function is severely reduced. The visual ejection  fraction is estimated at 20-25%.  2. The right ventricle is normal in structure, function and size.  3. The left atrium is moderately dilated.  4. The aortic valve is trileaflet with aortic valve sclerosis.  5. There is mild (1+) mitral regurgitation.  ______________________________________________________________________________      TTE 10/5/18  The visual ejection fraction is estimated at 35-40%.  There is mild-moderate global hypokinesia of the left ventricle.  Mildly decreased right ventricular systolic function  In comparison with the previous study the LVEF has improved slightly.    Assessment and Plan:   Mr. Manning is a 81 year old male with a past medical history including CLL in remission, CAD s/p CABG in 1995, HFrEF 2/2 ICM, atrial flutter, PVD s/p iliac artery stents and left lower extremity bypass, hypertension, diabetes, hyperlipidemia, prosthesis on leg for about 2-3 years now. discitis and multple admissions for decompensated heart failure. Presents to clinic for CORE f/u.    Patient is recently moved into assisted living which is led to a lot more consistency in taking his medications. His EF has worsened on his most recent echo.  It is now 20 to 25%.  We will titrate his neurohormonal blockade.  Then we will recheck an echo.  If he remains below 35% we will need to discuss ICD.    He was recently hospitalized for hypervolemia and his lasix was increased to 20 mg daily. It appear that his weight is up slightly from discharge, although difficult to tell d/t scale differences. We do not have reliable weights at this point from home.    # Chronic systolic heart failure/HFrEF secondary  to ICM  (Ef 20-25%)  Stage C. NYHA Class IIIA  Fluid status: hypervolemic-   ACEi/ARB/ARNI: Entresto 97/103 BID -max titration  BB: metoprolol  mg,- max titration  Aldosterone antagonist: Aldactone 25 mg- increase today   SCD prophylaxis: n/a EF now below 35%- will need to consider if not improving with CHF med titration  NSAID use: contraindicated  Sleep apnea evaluation: not discussed today  Remote monitoring: consider at future visits    # CAD s/p CABG  # PAD s/p iliac stenting and LE bypass  -no anginal symptoms, he is on asa and statin (defer high potency to cardiologist); he has stable claudication    # Atrial flutter  - he is on BB and digoxin and warfarin    # CKD stage 3a  - Recheck BMP in 2 weeks     Plan:  Increase the Spironolactone 25 mg daily  Extra dose Lasix 40 mg for two days  BMP in 2 weeks   CORE January 3rd Monday  - 3:00 labs at 2:30      Billing  - I managed 2+ stable chronic conditions  - I changed a prescription medication        Lindsay GILBERT NP-C  Advanced Heart Failure Nurse Practitioner  [facility]        FABY ZURITA

## 2021-12-03 NOTE — NURSING NOTE
Chief Complaint   Patient presents with     Follow Up     Return CORE, 81 year old male with chronic systolic heart failure presents for follow up with labs prior.      Vitals were taken and medications reconciled.    Dane Sandoval, EMT  1:41 PM

## 2021-12-03 NOTE — NURSING NOTE
Medication Change: Patient was educated regarding prescribed medication change, including discussion of the indication, administration, side effects, and when to report to MD or RN. Patient demonstrated understanding of this information and agreed to call with further questions or concerns.--Take extra 40mg dose of lasix daily for 2 days. Increase spironolactone to 25mg daily.    Return Appointment: Patient given instructions regarding scheduling next clinic visit. Patient demonstrated understanding of this information and agreed to call with further questions or concerns.--BMP lab in 2 weeks at pt's residence. Follow up Jan 3rd with CORE clinic with labs prior.    Patient stated he understood all health information given and agreed to call with further questions or concerns.    Called [facility] from pt's residence and left message. Faxed over medication changes and lab order for BMP.     Naomi Guo RN

## 2021-12-03 NOTE — LETTER
12/3/2021      RE: Tad Manning  [address]  [facility] [address]       Dear Colleague,    Thank you for the opportunity to participate in the care of your patient, Tad Manning, at the [facility] [place] [facility]. Please see a copy of my visit note below.    In person appointment    HPI:   Mr. Manning is a 81 year old male with a past medical history including CLL in remission, CAD s/p CABG in 1995, HFrEF 2/2 ICM, atrial flutter, PVD s/p iliac artery stents and left lower extremity bypass, hypertension, diabetes, hyperlipidemia, prosthesis on leg for about 2-3 years now. discitis and multple admission for decompensated heart failure. Presents to clinic for CORE f/u.    He was admitted from 9/22-9/25. He was diuresed from 190 lbs to 179. His entresto was increased. Aldactone was added. His lasix was increased from 10 mg daily to 40 mg daily.     Recently moved to [facility]( living facility) in [place]. He is feeling better today.  Weights weekly 190.4  No SOB at rest. Walking from his apartment down to meals is about 1.5 blocks and he feels he can tolerated not winded.  LE edema is there, but about as good as it gets. Prosthesis on r leg. No abdominal edema. He denies orthopnea, PND. He gets some lightheadedness right when he stands. No palpitations or syncope. No chest pain.        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 (H)      Chronic systolic heart failure (H) 2017     Confusion 2021     Coronary artery disease     triple bypass      CRF (chronic renal failure), stage 3 (moderate) (H) 2017     Critical lower limb ischemia (H) 1/10/2014     Diabetes mellitus (H)      Diabetes mellitus, type 2 (H) 2014     Diabetic polyneuropathy (H)      Discitis 2017     Epidural abscess 2017     Fall 2017     History of blood transfusion      HTN (hypertension) 2014     Hyperglycemia 2020     Hyperlipidaemia      Hyperlipidemia with target LDL less than 100 2014    Formatting of this note might be different from the original. Overview:  Diagnosis updated by automated process. Provider to review and confirm. Formatting of this note might be different from the original. Diagnosis updated by automated process. Provider to review and confirm.     Hypertension      Long term current use of anticoagulant therapy 2018     Non-healing ulcer of foot (H)     Right     Noninflammatory pericardial effusion      Osteomyelitis (H) 2014     Osteomyelitis of foot, right, acute (H) 2/15/2017     Osteomyelitis of left foot (H)      PVD (peripheral vascular disease) (H)      Recurrent falls 2021     Sebaceous cyst        FAMILY HISTORY:  Family History   Problem Relation Age of Onset     Cancer Mother         leukemia     Leukemia Mother        SOCIAL HISTORY:  Socioeconomic History     Marital status: Single   Tobacco Use     Smoking status: Former Smoker     Packs/day: 2.00     Years: 30.00     Pack years: 60.00     Types: Cigarettes     Last attempt to quit: 1995     Years since quittin.5     Smokeless tobacco: Never Used   Other Topics Concern     Parent/sibling w/ CABG, MI or angioplasty before 65F 55M? Not Asked   Social History Narrative    Lives independently with SO. 2017        CURRENT MEDICATIONS:  acetaminophen (TYLENOL) 325 MG tablet, Take 650 mg by  mouth every 4 hours as needed for mild pain or fever  alcohol swab prep pads, Use to swab area of injection/jina as directed.  apixaban ANTICOAGULANT (ELIQUIS ANTICOAGULANT) 5 MG tablet, Take 1 tablet (5 mg) by mouth 2 times daily  aspirin (ASA) 81 MG chewable tablet, Take 81 mg by mouth daily   atorvastatin (LIPITOR) 20 MG tablet, Take 1 tablet (20 mg) by mouth daily  Mahsa Protect (EUCERIN) external cream, Apply topically 2 times daily as needed for dry skin or other Apply as needed to intact stage 1 pressure area and/or irritated skin, rash or excoriation  bisacodyl (DULCOLAX) 10 MG suppository, Place 10 mg rectally daily as needed for constipation If no BM or small BM for 3 days/9 shifts  blood glucose (NO BRAND SPECIFIED) test strip, Use to test blood sugar 4 times daily or as directed.  blood glucose calibration (NO BRAND SPECIFIED) solution, 1 drop every 14 days Use 1 drop to calibrate blood glucose monitor every 14 days  blood glucose monitoring (NO BRAND SPECIFIED) meter device kit, Use to test blood sugar 4 times daily or as directed.  cholecalciferol 1000 UNITS TABS, Take 1,000 Units by mouth daily  citalopram (CELEXA) 20 MG tablet, Take 20 mg by mouth every morning   digoxin (LANOXIN) 125 MCG tablet, Take 1 tablet (125 mcg) by mouth daily  furosemide (LASIX) 40 MG tablet, Take 1 tablet (40 mg) by mouth daily  insulin glargine (LANTUS PEN) 100 UNIT/ML pen, Inject 30 Units Subcutaneous At Bedtime  insulin lispro (HUMALOG KWIKPEN) 100 UNIT/ML (1 unit dial) KWIKPEN, Inject 12 Units Subcutaneous 2 times daily (before meals) Breakfast and lunch  insulin lispro (HUMALOG KWIKPEN) 100 UNIT/ML (1 unit dial) KWIKPEN, Inject 14 Units Subcutaneous daily (with dinner)  loperamide (IMODIUM A-D) 2 MG tablet, Take 2-4 mg by mouth 4 times daily as needed for diarrhea Take 4 mg after 1st loose stool, then take 1 tablet as needed after each loose stool  magnesium hydroxide (MILK OF MAGNESIA) 400 MG/5ML suspension, Take 30  "mLs by mouth daily as needed for constipation or heartburn If no BM/small BM for 3 days/9 shifts  metFORMIN (GLUCOPHAGE-XR) 750 MG 24 hr tablet, Take 750 mg by mouth every morning  metoprolol succinate ER (TOPROL-XL) 100 MG 24 hr tablet, Take 1 tablet (100 mg) by mouth daily  neomycin-bacitracin-polymyxin (NEOSPORIN) 5-400-5000 ointment, Apply 1 each topically 3 times daily as needed (for minor abrasions)   nitroglycerin (NITROSTAT) 0.4 MG sublingual tablet, For chest pain place 1 tablet under the tongue every 5 minutes for 3 doses. If symptoms persist 5 minutes after 1st dose call 911.  nystatin (MYCOSTATIN) 998553 UNIT/GM external powder, Apply topically 2 times daily as needed (to affected area(s) as needed)  order for DME, Equipment to be ordered:  Scale- Qty 1  Commander Flex - Qty. 1  Pulse-Oximeter - Qty. 1  Use as directed  order for DME, Equipment being ordered: [facility] shoe  pantoprazole (PROTONIX) 40 MG EC tablet, Take 40 mg by mouth daily  polyethylene glycol (MIRALAX) 17 GM/Dose powder, Take 17 g by mouth daily as needed  sacubitril-valsartan (ENTRESTO)  MG per tablet, Take 1 tablet by mouth 2 times daily  spironolactone (ALDACTONE) 25 MG tablet, Take 0.5 tablets (12.5 mg) by mouth daily  thin (NO BRAND SPECIFIED) lancets, 1 each 4 times daily Use with lanceting device.  traZODone (DESYREL) 50 MG tablet, Take 50 mg by mouth At Bedtime  ULTICARE SHORT 31G X 8 MM insulin pen needle,   venetoclax (VENCLEXTA) 100 MG tablet, Take 200 mg by mouth daily  guaiFENesin (ROBITUSSIN) 100 MG/5ML SYRP, Take 10 mLs by mouth every 4 hours as needed for cough For cold symptoms and non productive cough  (Patient not taking: Reported on 12/3/2021)    No current facility-administered medications on file prior to visit.      ROS:   See HPI    EXAM:   /60 (BP Location: Right arm, Patient Position: Chair, Cuff Size: Adult Regular)   Pulse 74   Ht 1.79 m (5' 10.47\")   Wt 83.4 kg (183 lb 14.4 oz)   SpO2 99%   BMI " 26.03 kg/m       GENERAL: Appears comfortable, in no acute distress.   HEENT: Eye symmetrical, no discharge or icterus bilaterally. Mucous membranes moist and without lesions.  CV: RRR, +S1S2, no murmur, rub, or gallop. JVP just above clavicle at 90 degrees.   RESPIRATORY: Respirations regular, even, and unlabored. Lungs CTA throughout.   GI: Soft and non distended with normoactive bowel sounds present in all quadrants. No tenderness, rebound, guarding. No hepatomegaly.   EXTREMITIES: AKA of the right leg. Left leg with moderate edema. Left leg is warm and well perfused.  NEUROLOGIC: Alert and interacting appropriately. No focal deficits.   MUSCULOSKELETAL: No joint swelling or tenderness.   SKIN: No jaundice. No rashes or lesions.     Labs, reviewed with patient in clinic today:  CBC RESULTS:  Lab Results   Component Value Date    WBC 3.0 (L) 09/25/2021    WBC 3.7 (L) 06/26/2021    RBC 3.40 (L) 09/25/2021    RBC 3.09 (L) 06/26/2021    HGB 10.4 (L) 09/25/2021    HGB 9.6 (L) 06/26/2021    HCT 32.2 (L) 09/25/2021    HCT 28.6 (L) 06/26/2021    MCV 95 09/25/2021    MCV 93 06/26/2021    MCH 30.6 09/25/2021    MCH 31.1 06/26/2021    MCHC 32.3 09/25/2021    MCHC 33.6 06/26/2021    RDW 16.9 (H) 09/25/2021    RDW 14.6 06/26/2021    PLT 99 (L) 09/25/2021     (L) 06/26/2021       CMP RESULTS:  Lab Results   Component Value Date     12/03/2021     06/28/2021    POTASSIUM 4.0 12/03/2021    POTASSIUM 4.1 06/28/2021    CHLORIDE 110 (H) 12/03/2021    CHLORIDE 109 06/28/2021    CO2 27 12/03/2021    CO2 24 06/28/2021    ANIONGAP 1 (L) 12/03/2021    ANIONGAP 4 06/28/2021    GLC 79 12/03/2021     (H) 06/28/2021    BUN 27 12/03/2021    BUN 43 (H) 06/28/2021    CR 1.70 (H) 12/03/2021    CR 1.33 (H) 06/28/2021    GFRESTIMATED 38 (L) 12/03/2021    GFRESTIMATED >60 07/08/2021    GFRESTIMATED 51 (L) 06/28/2021    GFRESTBLACK >60 07/08/2021    GFRESTBLACK 59 (L) 06/28/2021    JUSTINO 9.1 12/03/2021    JUSTINO 9.4 06/28/2021     BILITOTAL 0.8 09/23/2021    BILITOTAL 0.9 06/20/2021    ALBUMIN 3.0 (L) 09/23/2021    ALBUMIN 3.3 (L) 06/20/2021    ALKPHOS 84 09/23/2021    ALKPHOS 72 06/20/2021    ALT 12 09/23/2021    ALT 24 06/20/2021    AST 12 09/23/2021    AST 34 06/20/2021        INR RESULTS:  Lab Results   Component Value Date    INR 1.05 04/16/2021       Lab Results   Component Value Date    MAG 1.8 09/25/2021    MAG 2.0 06/23/2021     Lab Results   Component Value Date    NTBNPI 7,364 (H) 06/20/2021     Lab Results   Component Value Date    NTBNP 22,172 (H) 02/09/2018       Diagnostics:  TTE 6/20/21  1. Left ventricular systolic function is severely reduced. The visual ejection  fraction is estimated at 20-25%.  2. The right ventricle is normal in structure, function and size.  3. The left atrium is moderately dilated.  4. The aortic valve is trileaflet with aortic valve sclerosis.  5. There is mild (1+) mitral regurgitation.  ______________________________________________________________________________      TTE 10/5/18  The visual ejection fraction is estimated at 35-40%.  There is mild-moderate global hypokinesia of the left ventricle.  Mildly decreased right ventricular systolic function  In comparison with the previous study the LVEF has improved slightly.    Assessment and Plan:   Mr. Manning is a 81 year old male with a past medical history including CLL in remission, CAD s/p CABG in 1995, HFrEF 2/2 ICM, atrial flutter, PVD s/p iliac artery stents and left lower extremity bypass, hypertension, diabetes, hyperlipidemia, prosthesis on leg for about 2-3 years now. discitis and multple admissions for decompensated heart failure. Presents to clinic for CORE f/u.    Patient is recently moved into assisted living which is led to a lot more consistency in taking his medications. His EF has worsened on his most recent echo.  It is now 20 to 25%.  We will titrate his neurohormonal blockade.  Then we will recheck an echo.  If he remains  below 35% we will need to discuss ICD.    He was recently hospitalized for hypervolemia and his lasix was increased to 20 mg daily. It appear that his weight is up slightly from discharge, although difficult to tell d/t scale differences. We do not have reliable weights at this point from home.    # Chronic systolic heart failure/HFrEF secondary to ICM  (Ef 20-25%)  Stage C. NYHA Class IIIA  Fluid status: hypervolemic-   ACEi/ARB/ARNI: Entresto 97/103 BID -max titration  BB: metoprolol  mg,- max titration  Aldosterone antagonist: Aldactone 25 mg- increase today   SCD prophylaxis: n/a EF now below 35%- will need to consider if not improving with CHF med titration  NSAID use: contraindicated  Sleep apnea evaluation: not discussed today  Remote monitoring: consider at future visits    # CAD s/p CABG  # PAD s/p iliac stenting and LE bypass  -no anginal symptoms, he is on asa and statin (defer high potency to cardiologist); he has stable claudication    # Atrial flutter  - he is on BB and digoxin and warfarin    # CKD stage 3a  - Recheck BMP in 2 weeks     Plan:  Increase the Spironolactone 25 mg daily  Extra dose Lasix 40 mg for two days  BMP in 2 weeks   CORE January 3rd Monday  - 3:00 labs at 2:30      Billing  - I managed 2+ stable chronic conditions  - I changed a prescription medication      Lindsay GILBERT NP-C  Advanced Heart Failure Nurse Practitioner  [facility]  [facility]

## 2021-12-06 RX ORDER — FUROSEMIDE 20 MG
40 TABLET ORAL DAILY
Qty: 4 TABLET | Refills: 0 | Status: SHIPPED | OUTPATIENT
Start: 2021-12-06 | End: 2022-03-24

## 2021-12-15 ENCOUNTER — LAB REQUISITION (OUTPATIENT)
Dept: LAB | Facility: CLINIC | Age: 81
End: 2021-12-15
Payer: COMMERCIAL

## 2021-12-15 DIAGNOSIS — I50.9 HEART FAILURE, UNSPECIFIED (H): ICD-10-CM

## 2021-12-16 LAB
ANION GAP SERPL CALCULATED.3IONS-SCNC: 11 MMOL/L (ref 5–18)
BUN SERPL-MCNC: 32 MG/DL (ref 8–28)
CALCIUM SERPL-MCNC: 9.2 MG/DL (ref 8.5–10.5)
CHLORIDE BLD-SCNC: 102 MMOL/L (ref 98–107)
CO2 SERPL-SCNC: 22 MMOL/L (ref 22–31)
CREAT SERPL-MCNC: 1.87 MG/DL (ref 0.7–1.3)
GFR SERPL CREATININE-BSD FRML MDRD: 33 ML/MIN/1.73M2
GLUCOSE BLD-MCNC: 352 MG/DL (ref 70–125)
POTASSIUM BLD-SCNC: 4.1 MMOL/L (ref 3.5–5)
SODIUM SERPL-SCNC: 135 MMOL/L (ref 136–145)

## 2021-12-16 PROCEDURE — P9603 ONE-WAY ALLOW PRORATED MILES: HCPCS | Mod: ORL | Performed by: NURSE PRACTITIONER

## 2021-12-16 PROCEDURE — 80048 BASIC METABOLIC PNL TOTAL CA: CPT | Mod: ORL | Performed by: NURSE PRACTITIONER

## 2021-12-16 PROCEDURE — 36415 COLL VENOUS BLD VENIPUNCTURE: CPT | Mod: ORL | Performed by: NURSE PRACTITIONER

## 2021-12-17 ENCOUNTER — TELEPHONE (OUTPATIENT)
Dept: CARDIOLOGY | Facility: CLINIC | Age: 81
End: 2021-12-17
Payer: COMMERCIAL

## 2021-12-17 NOTE — TELEPHONE ENCOUNTER
Called [facility] with lab results. Would like to confirm his lasix dose and see how he is feeling. Left message asking for him to call me back.    Cathi Gomez RN

## 2021-12-20 ENCOUNTER — TELEPHONE (OUTPATIENT)
Dept: CARDIOLOGY | Facility: CLINIC | Age: 81
End: 2021-12-20
Payer: COMMERCIAL

## 2021-12-20 NOTE — TELEPHONE ENCOUNTER
Ana received a call from a nurse requesting dose of pt Lasix. Pt is receiving 40 mg daily. Weight on 12/15/21 was 188.4. Pt is not complaining of sob.

## 2021-12-20 NOTE — TELEPHONE ENCOUNTER
Called [facility] with lab results. He is feeling pretty good right now.    His weight has been about 183 and he denies swelling or shortness of breath.    He is taking 40 mg lasix daily as prescribed. He was unsure of the dose, voice message left at his facility to confirm dose. Awaiting call back.    Cathi Gomez RN

## 2021-12-23 ENCOUNTER — TELEPHONE (OUTPATIENT)
Dept: CARDIOLOGY | Facility: CLINIC | Age: 81
End: 2021-12-23
Payer: COMMERCIAL

## 2021-12-23 DIAGNOSIS — I50.9 ACUTE ON CHRONIC CONGESTIVE HEART FAILURE, UNSPECIFIED HEART FAILURE TYPE (H): Primary | ICD-10-CM

## 2021-12-23 NOTE — TELEPHONE ENCOUNTER
Called [facility] to let him know we will order labs for early next week and that he should be well hydrated at that time.    Cathi Gomez RN

## 2021-12-27 ENCOUNTER — TELEPHONE (OUTPATIENT)
Dept: CARDIOLOGY | Facility: CLINIC | Age: 81
End: 2021-12-27
Payer: COMMERCIAL

## 2021-12-27 NOTE — TELEPHONE ENCOUNTER
[facility] Call Center    Phone Message    May a detailed message be left on voicemail: yes     Reason for Call: Other: [facility] calling stating that he gets his blood drawn every 2 weeks because he's on entresto. He is wondering if he needs to get it drawn again before he comes in for his appt with Dr. Lomas because he had to reschedule for a later date now and his last draw was 12/16/21. Please call and discuss. Thank you1     Action Taken: Message routed to:  [facility] ([facility]): Cardio    Travel Screening: Not Applicable

## 2021-12-28 NOTE — TELEPHONE ENCOUNTER
Reviewed plan with Lindsay Shearer NP's team.  Plan was for [facility] to get labs this week and then he will need again on 1/26 prior to his appt with DICK Joshi.  LVM to that effect.

## 2021-12-29 ENCOUNTER — LAB REQUISITION (OUTPATIENT)
Dept: LAB | Facility: CLINIC | Age: 81
End: 2021-12-29
Payer: COMMERCIAL

## 2021-12-29 DIAGNOSIS — I50.9 HEART FAILURE, UNSPECIFIED (H): ICD-10-CM

## 2021-12-30 LAB
ANION GAP SERPL CALCULATED.3IONS-SCNC: 13 MMOL/L (ref 5–18)
BUN SERPL-MCNC: 22 MG/DL (ref 8–28)
CALCIUM SERPL-MCNC: 9.2 MG/DL (ref 8.5–10.5)
CHLORIDE BLD-SCNC: 101 MMOL/L (ref 98–107)
CO2 SERPL-SCNC: 21 MMOL/L (ref 22–31)
CREAT SERPL-MCNC: 1.68 MG/DL (ref 0.7–1.3)
GFR SERPL CREATININE-BSD FRML MDRD: 41 ML/MIN/1.73M2
GLUCOSE BLD-MCNC: 422 MG/DL (ref 70–125)
POTASSIUM BLD-SCNC: 4 MMOL/L (ref 3.5–5)
SODIUM SERPL-SCNC: 135 MMOL/L (ref 136–145)

## 2021-12-30 PROCEDURE — P9604 ONE-WAY ALLOW PRORATED TRIP: HCPCS | Mod: ORL | Performed by: NURSE PRACTITIONER

## 2021-12-30 PROCEDURE — 36415 COLL VENOUS BLD VENIPUNCTURE: CPT | Mod: ORL | Performed by: NURSE PRACTITIONER

## 2021-12-30 PROCEDURE — 80048 BASIC METABOLIC PNL TOTAL CA: CPT | Mod: ORL | Performed by: NURSE PRACTITIONER

## 2021-12-31 ENCOUNTER — TELEPHONE (OUTPATIENT)
Dept: CARDIOLOGY | Facility: CLINIC | Age: 81
End: 2021-12-31
Payer: COMMERCIAL

## 2021-12-31 NOTE — TELEPHONE ENCOUNTER
Called [facility] with lab results. He is feeling well. He does not know what his weight was this morning. He denies shortness of breath and says he only notices swelling around his prosthesis. I let him know that his glucose was quite elevated and that he should let his PCP know. He said that they have been checking his lab results. Hima had no further questions or concerns at this time.    Cathi Gomez RN

## 2022-01-01 ENCOUNTER — TRANSFERRED RECORDS (OUTPATIENT)
Dept: HEALTH INFORMATION MANAGEMENT | Facility: CLINIC | Age: 82
End: 2022-01-01

## 2022-01-01 ENCOUNTER — DOCUMENTATION ONLY (OUTPATIENT)
Dept: OTHER | Facility: CLINIC | Age: 82
End: 2022-01-01

## 2022-01-01 ENCOUNTER — LAB (OUTPATIENT)
Dept: LAB | Facility: CLINIC | Age: 82
End: 2022-01-01
Payer: COMMERCIAL

## 2022-01-01 ENCOUNTER — LAB REQUISITION (OUTPATIENT)
Dept: LAB | Facility: CLINIC | Age: 82
End: 2022-01-01
Payer: COMMERCIAL

## 2022-01-01 ENCOUNTER — HOSPITAL ENCOUNTER (EMERGENCY)
Facility: CLINIC | Age: 82
Discharge: OTHER INSTITUTION | End: 2022-11-23
Attending: EMERGENCY MEDICINE | Admitting: EMERGENCY MEDICINE
Payer: COMMERCIAL

## 2022-01-01 ENCOUNTER — TELEPHONE (OUTPATIENT)
Dept: CARDIOLOGY | Facility: CLINIC | Age: 82
End: 2022-01-01

## 2022-01-01 ENCOUNTER — OFFICE VISIT (OUTPATIENT)
Dept: CARDIOLOGY | Facility: CLINIC | Age: 82
End: 2022-01-01
Attending: NURSE PRACTITIONER
Payer: COMMERCIAL

## 2022-01-01 ENCOUNTER — HOSPITAL ENCOUNTER (OUTPATIENT)
Age: 82
End: 2022-01-01
Attending: INTERNAL MEDICINE
Payer: COMMERCIAL

## 2022-01-01 ENCOUNTER — LAB (OUTPATIENT)
Dept: LAB | Facility: CLINIC | Age: 82
End: 2022-01-01
Attending: PHYSICIAN ASSISTANT
Payer: COMMERCIAL

## 2022-01-01 ENCOUNTER — APPOINTMENT (OUTPATIENT)
Dept: RADIOLOGY | Facility: CLINIC | Age: 82
End: 2022-01-01
Attending: EMERGENCY MEDICINE
Payer: COMMERCIAL

## 2022-01-01 ENCOUNTER — PATIENT OUTREACH (OUTPATIENT)
Dept: CARDIOLOGY | Facility: CLINIC | Age: 82
End: 2022-01-01

## 2022-01-01 ENCOUNTER — OFFICE VISIT (OUTPATIENT)
Dept: CARDIOLOGY | Facility: CLINIC | Age: 82
End: 2022-01-01
Attending: PHYSICIAN ASSISTANT
Payer: COMMERCIAL

## 2022-01-01 ENCOUNTER — HEALTH MAINTENANCE LETTER (OUTPATIENT)
Age: 82
End: 2022-01-01

## 2022-01-01 VITALS
HEART RATE: 67 BPM | DIASTOLIC BLOOD PRESSURE: 58 MMHG | BODY MASS INDEX: 27.02 KG/M2 | WEIGHT: 193 LBS | RESPIRATION RATE: 27 BRPM | HEIGHT: 71 IN | SYSTOLIC BLOOD PRESSURE: 124 MMHG | OXYGEN SATURATION: 99 % | TEMPERATURE: 102.8 F

## 2022-01-01 VITALS
WEIGHT: 191.3 LBS | HEIGHT: 71 IN | SYSTOLIC BLOOD PRESSURE: 120 MMHG | HEART RATE: 72 BPM | OXYGEN SATURATION: 98 % | BODY MASS INDEX: 26.78 KG/M2 | DIASTOLIC BLOOD PRESSURE: 85 MMHG

## 2022-01-01 VITALS
OXYGEN SATURATION: 99 % | DIASTOLIC BLOOD PRESSURE: 45 MMHG | SYSTOLIC BLOOD PRESSURE: 126 MMHG | WEIGHT: 200.6 LBS | BODY MASS INDEX: 28.08 KG/M2 | HEART RATE: 66 BPM | HEIGHT: 71 IN

## 2022-01-01 VITALS
SYSTOLIC BLOOD PRESSURE: 126 MMHG | DIASTOLIC BLOOD PRESSURE: 45 MMHG | HEIGHT: 71 IN | WEIGHT: 200.1 LBS | HEART RATE: 66 BPM | BODY MASS INDEX: 28.01 KG/M2 | OXYGEN SATURATION: 99 %

## 2022-01-01 DIAGNOSIS — R09.02 HYPOXIA: ICD-10-CM

## 2022-01-01 DIAGNOSIS — I50.9 ACUTE ON CHRONIC CONGESTIVE HEART FAILURE, UNSPECIFIED HEART FAILURE TYPE (H): ICD-10-CM

## 2022-01-01 DIAGNOSIS — I50.22 CHRONIC SYSTOLIC HEART FAILURE (H): ICD-10-CM

## 2022-01-01 DIAGNOSIS — J10.1 INFLUENZA A: ICD-10-CM

## 2022-01-01 DIAGNOSIS — I10 ESSENTIAL (PRIMARY) HYPERTENSION: ICD-10-CM

## 2022-01-01 DIAGNOSIS — I25.5 ISCHEMIC CARDIOMYOPATHY: Primary | ICD-10-CM

## 2022-01-01 DIAGNOSIS — N18.9 CHRONIC KIDNEY DISEASE, UNSPECIFIED CKD STAGE: ICD-10-CM

## 2022-01-01 DIAGNOSIS — I50.9 ACUTE ON CHRONIC CONGESTIVE HEART FAILURE, UNSPECIFIED HEART FAILURE TYPE (H): Primary | ICD-10-CM

## 2022-01-01 DIAGNOSIS — E11.51 TYPE 2 DIABETES MELLITUS WITH DIABETIC PERIPHERAL ANGIOPATHY WITHOUT GANGRENE (H): ICD-10-CM

## 2022-01-01 DIAGNOSIS — D64.9 ANEMIA, UNSPECIFIED: ICD-10-CM

## 2022-01-01 DIAGNOSIS — I50.22 CHRONIC SYSTOLIC HEART FAILURE (H): Primary | ICD-10-CM

## 2022-01-01 LAB
ALBUMIN SERPL-MCNC: 3.3 G/DL (ref 3.5–5)
ALP SERPL-CCNC: 66 U/L (ref 45–120)
ALT SERPL W P-5'-P-CCNC: <9 U/L (ref 0–45)
ANION GAP SERPL CALCULATED.3IONS-SCNC: 11 MMOL/L (ref 5–18)
ANION GAP SERPL CALCULATED.3IONS-SCNC: 11 MMOL/L (ref 7–15)
ANION GAP SERPL CALCULATED.3IONS-SCNC: 13 MMOL/L (ref 7–15)
ANION GAP SERPL CALCULATED.3IONS-SCNC: 14 MMOL/L (ref 7–15)
ANION GAP SERPL CALCULATED.3IONS-SCNC: 14 MMOL/L (ref 7–15)
AST SERPL W P-5'-P-CCNC: 39 U/L (ref 0–40)
ATRIAL RATE - MUSE: 67 BPM
ATRIAL RATE - MUSE: 85 BPM
BACTERIA BLD CULT: NO GROWTH
BACTERIA BLD CULT: NO GROWTH
BASE EXCESS BLDV CALC-SCNC: -6.4 MMOL/L
BASOPHILS # BLD AUTO: 0 10E3/UL (ref 0–0.2)
BASOPHILS NFR BLD AUTO: 0 %
BILIRUB DIRECT SERPL-MCNC: 0.3 MG/DL
BILIRUB SERPL-MCNC: 0.7 MG/DL (ref 0–1)
BUN SERPL-MCNC: 28.9 MG/DL (ref 8–23)
BUN SERPL-MCNC: 30.6 MG/DL (ref 8–23)
BUN SERPL-MCNC: 33.2 MG/DL (ref 8–23)
BUN SERPL-MCNC: 34 MG/DL (ref 8–28)
BUN SERPL-MCNC: 50.4 MG/DL (ref 8–23)
C REACTIVE PROTEIN LHE: 8.5 MG/DL (ref 0–?)
CALCIUM SERPL-MCNC: 8.5 MG/DL (ref 8.5–10.5)
CALCIUM SERPL-MCNC: 9.3 MG/DL (ref 8.8–10.2)
CALCIUM SERPL-MCNC: 9.4 MG/DL (ref 8.8–10.2)
CALCIUM SERPL-MCNC: 9.5 MG/DL (ref 8.8–10.2)
CALCIUM SERPL-MCNC: 9.8 MG/DL (ref 8.8–10.2)
CHLORIDE BLD-SCNC: 107 MMOL/L (ref 98–107)
CHLORIDE SERPL-SCNC: 104 MMOL/L (ref 98–107)
CHLORIDE SERPL-SCNC: 106 MMOL/L (ref 98–107)
CHLORIDE SERPL-SCNC: 106 MMOL/L (ref 98–107)
CHLORIDE SERPL-SCNC: 107 MMOL/L (ref 98–107)
CO2 SERPL-SCNC: 16 MMOL/L (ref 22–31)
CREAT SERPL-MCNC: 1.74 MG/DL (ref 0.67–1.17)
CREAT SERPL-MCNC: 1.75 MG/DL (ref 0.67–1.17)
CREAT SERPL-MCNC: 1.85 MG/DL (ref 0.67–1.17)
CREAT SERPL-MCNC: 1.93 MG/DL (ref 0.67–1.17)
CREAT SERPL-MCNC: 2 MG/DL (ref 0.7–1.3)
DEPRECATED HCO3 PLAS-SCNC: 19 MMOL/L (ref 22–29)
DEPRECATED HCO3 PLAS-SCNC: 20 MMOL/L (ref 22–29)
DEPRECATED HCO3 PLAS-SCNC: 20 MMOL/L (ref 22–29)
DEPRECATED HCO3 PLAS-SCNC: 22 MMOL/L (ref 22–29)
DIASTOLIC BLOOD PRESSURE - MUSE: 90 MMHG
DIASTOLIC BLOOD PRESSURE - MUSE: NORMAL MMHG
EOSINOPHIL # BLD AUTO: 0 10E3/UL (ref 0–0.7)
EOSINOPHIL NFR BLD AUTO: 0 %
ERYTHROCYTE [DISTWIDTH] IN BLOOD BY AUTOMATED COUNT: 15.9 % (ref 10–15)
ERYTHROCYTE [DISTWIDTH] IN BLOOD BY AUTOMATED COUNT: 16.7 % (ref 10–15)
FLUAV RNA SPEC QL NAA+PROBE: POSITIVE
FLUBV RNA RESP QL NAA+PROBE: NEGATIVE
GFR SERPL CREATININE-BSD FRML MDRD: 33 ML/MIN/1.73M2
GFR SERPL CREATININE-BSD FRML MDRD: 35 ML/MIN/1.73M2
GFR SERPL CREATININE-BSD FRML MDRD: 36 ML/MIN/1.73M2
GFR SERPL CREATININE-BSD FRML MDRD: 39 ML/MIN/1.73M2
GFR SERPL CREATININE-BSD FRML MDRD: 39 ML/MIN/1.73M2
GLUCOSE BLD-MCNC: 150 MG/DL (ref 70–125)
GLUCOSE BLDC GLUCOMTR-MCNC: 124 MG/DL (ref 70–99)
GLUCOSE SERPL-MCNC: 111 MG/DL (ref 70–99)
GLUCOSE SERPL-MCNC: 144 MG/DL (ref 70–99)
GLUCOSE SERPL-MCNC: 174 MG/DL (ref 70–99)
GLUCOSE SERPL-MCNC: 194 MG/DL (ref 70–99)
HBA1C MFR BLD: 6.7 %
HCO3 BLDV-SCNC: 19 MMOL/L (ref 24–30)
HCT VFR BLD AUTO: 31.5 % (ref 40–53)
HCT VFR BLD AUTO: 33.9 % (ref 40–53)
HGB BLD-MCNC: 10.3 G/DL (ref 13.3–17.7)
HGB BLD-MCNC: 10.7 G/DL (ref 13.3–17.7)
HGB BLD-MCNC: 10.7 G/DL (ref 13.3–17.7)
IMM GRANULOCYTES # BLD: 0 10E3/UL
IMM GRANULOCYTES NFR BLD: 0 %
INTERPRETATION ECG - MUSE: NORMAL
INTERPRETATION ECG - MUSE: NORMAL
LACTATE SERPL-SCNC: 1.1 MMOL/L (ref 0.7–2)
LVEF ECHO: NORMAL
LYMPHOCYTES # BLD AUTO: 0.4 10E3/UL (ref 0.8–5.3)
LYMPHOCYTES NFR BLD AUTO: 7 %
MAGNESIUM SERPL-MCNC: 1.6 MG/DL (ref 1.8–2.6)
MCH RBC QN AUTO: 30.7 PG (ref 26.5–33)
MCH RBC QN AUTO: 30.9 PG (ref 26.5–33)
MCHC RBC AUTO-ENTMCNC: 31.6 G/DL (ref 31.5–36.5)
MCHC RBC AUTO-ENTMCNC: 32.7 G/DL (ref 31.5–36.5)
MCV RBC AUTO: 95 FL (ref 78–100)
MCV RBC AUTO: 97 FL (ref 78–100)
MONOCYTES # BLD AUTO: 0.9 10E3/UL (ref 0–1.3)
MONOCYTES NFR BLD AUTO: 16 %
NEUTROPHILS # BLD AUTO: 4.4 10E3/UL (ref 1.6–8.3)
NEUTROPHILS NFR BLD AUTO: 77 %
NRBC # BLD AUTO: 0 10E3/UL
NRBC BLD AUTO-RTO: 0 /100
OXYHGB MFR BLDV: 78.8 % (ref 70–75)
P AXIS - MUSE: 37 DEGREES
P AXIS - MUSE: NORMAL DEGREES
PCO2 BLDV: 31 MM HG (ref 35–50)
PH BLDV: 7.38 [PH] (ref 7.35–7.45)
PLATELET # BLD AUTO: 132 10E3/UL (ref 150–450)
PLATELET # BLD AUTO: 89 10E3/UL (ref 150–450)
PO2 BLDV: 45 MM HG (ref 25–47)
POTASSIUM BLD-SCNC: 3.8 MMOL/L (ref 3.5–5)
POTASSIUM SERPL-SCNC: 3.7 MMOL/L (ref 3.4–5.3)
POTASSIUM SERPL-SCNC: 4.2 MMOL/L (ref 3.4–5.3)
POTASSIUM SERPL-SCNC: 4.3 MMOL/L (ref 3.4–5.3)
POTASSIUM SERPL-SCNC: 4.4 MMOL/L (ref 3.4–5.3)
PR INTERVAL - MUSE: 246 MS
PR INTERVAL - MUSE: 272 MS
PROCALCITONIN SERPL-MCNC: 0.44 NG/ML (ref 0–0.49)
PROT SERPL-MCNC: 6.3 G/DL (ref 6–8)
QRS DURATION - MUSE: 144 MS
QRS DURATION - MUSE: 148 MS
QT - MUSE: 372 MS
QT - MUSE: 406 MS
QTC - MUSE: 429 MS
QTC - MUSE: 442 MS
R AXIS - MUSE: -29 DEGREES
R AXIS - MUSE: -44 DEGREES
RBC # BLD AUTO: 3.33 10E6/UL (ref 4.4–5.9)
RBC # BLD AUTO: 3.49 10E6/UL (ref 4.4–5.9)
RSV RNA SPEC NAA+PROBE: NEGATIVE
SAO2 % BLDV: 80.3 % (ref 70–75)
SARS-COV-2 RNA RESP QL NAA+PROBE: NEGATIVE
SODIUM SERPL-SCNC: 134 MMOL/L (ref 136–145)
SODIUM SERPL-SCNC: 136 MMOL/L (ref 136–145)
SODIUM SERPL-SCNC: 139 MMOL/L (ref 136–145)
SODIUM SERPL-SCNC: 140 MMOL/L (ref 136–145)
SODIUM SERPL-SCNC: 141 MMOL/L (ref 136–145)
SYSTOLIC BLOOD PRESSURE - MUSE: 190 MMHG
SYSTOLIC BLOOD PRESSURE - MUSE: NORMAL MMHG
T AXIS - MUSE: 105 DEGREES
T AXIS - MUSE: 125 DEGREES
VENTRICULAR RATE- MUSE: 67 BPM
VENTRICULAR RATE- MUSE: 85 BPM
WBC # BLD AUTO: 5.6 10E3/UL (ref 4–11)
WBC # BLD AUTO: 6 10E3/UL (ref 4–11)

## 2022-01-01 PROCEDURE — 36415 COLL VENOUS BLD VENIPUNCTURE: CPT | Performed by: EMERGENCY MEDICINE

## 2022-01-01 PROCEDURE — 94640 AIRWAY INHALATION TREATMENT: CPT

## 2022-01-01 PROCEDURE — 99215 OFFICE O/P EST HI 40 MIN: CPT | Mod: 25 | Performed by: INTERNAL MEDICINE

## 2022-01-01 PROCEDURE — 99214 OFFICE O/P EST MOD 30 MIN: CPT | Performed by: PHYSICIAN ASSISTANT

## 2022-01-01 PROCEDURE — 93005 ELECTROCARDIOGRAM TRACING: CPT | Performed by: EMERGENCY MEDICINE

## 2022-01-01 PROCEDURE — 36415 COLL VENOUS BLD VENIPUNCTURE: CPT | Mod: ORL | Performed by: INTERNAL MEDICINE

## 2022-01-01 PROCEDURE — 85014 HEMATOCRIT: CPT | Performed by: EMERGENCY MEDICINE

## 2022-01-01 PROCEDURE — 93306 TTE W/DOPPLER COMPLETE: CPT | Mod: GC | Performed by: INTERNAL MEDICINE

## 2022-01-01 PROCEDURE — 250N000013 HC RX MED GY IP 250 OP 250 PS 637: Performed by: EMERGENCY MEDICINE

## 2022-01-01 PROCEDURE — P9604 ONE-WAY ALLOW PRORATED TRIP: HCPCS | Mod: ORL

## 2022-01-01 PROCEDURE — 85027 COMPLETE CBC AUTOMATED: CPT | Mod: ORL | Performed by: FAMILY MEDICINE

## 2022-01-01 PROCEDURE — 36415 COLL VENOUS BLD VENIPUNCTURE: CPT | Mod: ORL

## 2022-01-01 PROCEDURE — 83605 ASSAY OF LACTIC ACID: CPT | Performed by: EMERGENCY MEDICINE

## 2022-01-01 PROCEDURE — 80048 BASIC METABOLIC PNL TOTAL CA: CPT | Performed by: PATHOLOGY

## 2022-01-01 PROCEDURE — 36415 COLL VENOUS BLD VENIPUNCTURE: CPT | Performed by: PATHOLOGY

## 2022-01-01 PROCEDURE — 80048 BASIC METABOLIC PNL TOTAL CA: CPT | Mod: ORL

## 2022-01-01 PROCEDURE — 83036 HEMOGLOBIN GLYCOSYLATED A1C: CPT | Mod: ORL | Performed by: FAMILY MEDICINE

## 2022-01-01 PROCEDURE — 71045 X-RAY EXAM CHEST 1 VIEW: CPT

## 2022-01-01 PROCEDURE — G0463 HOSPITAL OUTPT CLINIC VISIT: HCPCS

## 2022-01-01 PROCEDURE — 87040 BLOOD CULTURE FOR BACTERIA: CPT | Performed by: EMERGENCY MEDICINE

## 2022-01-01 PROCEDURE — 83735 ASSAY OF MAGNESIUM: CPT | Performed by: EMERGENCY MEDICINE

## 2022-01-01 PROCEDURE — G0463 HOSPITAL OUTPT CLINIC VISIT: HCPCS | Mod: 25,27

## 2022-01-01 PROCEDURE — 83036 HEMOGLOBIN GLYCOSYLATED A1C: CPT | Mod: ORL

## 2022-01-01 PROCEDURE — 99285 EMERGENCY DEPT VISIT HI MDM: CPT | Mod: CS,25

## 2022-01-01 PROCEDURE — 80048 BASIC METABOLIC PNL TOTAL CA: CPT | Mod: ORL | Performed by: FAMILY MEDICINE

## 2022-01-01 PROCEDURE — 82805 BLOOD GASES W/O2 SATURATION: CPT | Performed by: EMERGENCY MEDICINE

## 2022-01-01 PROCEDURE — 82310 ASSAY OF CALCIUM: CPT | Performed by: EMERGENCY MEDICINE

## 2022-01-01 PROCEDURE — 99214 OFFICE O/P EST MOD 30 MIN: CPT | Performed by: NURSE PRACTITIONER

## 2022-01-01 PROCEDURE — 87637 SARSCOV2&INF A&B&RSV AMP PRB: CPT | Performed by: EMERGENCY MEDICINE

## 2022-01-01 PROCEDURE — P9604 ONE-WAY ALLOW PRORATED TRIP: HCPCS | Mod: ORL | Performed by: INTERNAL MEDICINE

## 2022-01-01 PROCEDURE — P9604 ONE-WAY ALLOW PRORATED TRIP: HCPCS | Mod: ORL | Performed by: FAMILY MEDICINE

## 2022-01-01 PROCEDURE — C9803 HOPD COVID-19 SPEC COLLECT: HCPCS

## 2022-01-01 PROCEDURE — G0463 HOSPITAL OUTPT CLINIC VISIT: HCPCS | Mod: 25

## 2022-01-01 PROCEDURE — 80048 BASIC METABOLIC PNL TOTAL CA: CPT | Mod: ORL | Performed by: INTERNAL MEDICINE

## 2022-01-01 PROCEDURE — 93005 ELECTROCARDIOGRAM TRACING: CPT

## 2022-01-01 PROCEDURE — 86140 C-REACTIVE PROTEIN: CPT | Performed by: EMERGENCY MEDICINE

## 2022-01-01 PROCEDURE — 84145 PROCALCITONIN (PCT): CPT | Performed by: EMERGENCY MEDICINE

## 2022-01-01 PROCEDURE — 85018 HEMOGLOBIN: CPT | Mod: ORL | Performed by: INTERNAL MEDICINE

## 2022-01-01 PROCEDURE — 85027 COMPLETE CBC AUTOMATED: CPT | Mod: ORL

## 2022-01-01 PROCEDURE — 250N000009 HC RX 250: Performed by: EMERGENCY MEDICINE

## 2022-01-01 PROCEDURE — 82248 BILIRUBIN DIRECT: CPT | Performed by: EMERGENCY MEDICINE

## 2022-01-01 PROCEDURE — 36415 COLL VENOUS BLD VENIPUNCTURE: CPT | Mod: ORL | Performed by: FAMILY MEDICINE

## 2022-01-01 RX ORDER — METOPROLOL SUCCINATE 100 MG/1
200 TABLET, EXTENDED RELEASE ORAL DAILY
Qty: 180 TABLET | Refills: 3 | Status: SHIPPED | OUTPATIENT
Start: 2022-01-01 | End: 2023-01-01

## 2022-01-01 RX ORDER — METOPROLOL SUCCINATE 100 MG/1
200 TABLET, EXTENDED RELEASE ORAL DAILY
Qty: 180 TABLET | Refills: 3 | COMMUNITY
Start: 2022-01-01 | End: 2022-01-01

## 2022-01-01 RX ORDER — FUROSEMIDE 20 MG
TABLET ORAL
Qty: 14 TABLET | OUTPATIENT
Start: 2022-01-01

## 2022-01-01 RX ORDER — FUROSEMIDE 20 MG
20 TABLET ORAL DAILY
Qty: 90 TABLET | Refills: 3 | Status: SHIPPED | OUTPATIENT
Start: 2022-01-01 | End: 2022-01-01

## 2022-01-01 RX ORDER — FUROSEMIDE 20 MG
20 TABLET ORAL DAILY
Qty: 90 TABLET | Refills: 3 | Status: SHIPPED | OUTPATIENT
Start: 2022-01-01 | End: 2023-01-01

## 2022-01-01 RX ORDER — IPRATROPIUM BROMIDE AND ALBUTEROL SULFATE 2.5; .5 MG/3ML; MG/3ML
3 SOLUTION RESPIRATORY (INHALATION) ONCE
Status: COMPLETED | OUTPATIENT
Start: 2022-01-01 | End: 2022-01-01

## 2022-01-01 RX ORDER — METOPROLOL SUCCINATE 25 MG/1
25 TABLET, EXTENDED RELEASE ORAL DAILY
Qty: 90 TABLET | Refills: 1 | Status: SHIPPED | OUTPATIENT
Start: 2022-01-01 | End: 2022-01-01

## 2022-01-01 RX ORDER — ACETAMINOPHEN 325 MG/1
975 TABLET ORAL ONCE
Status: COMPLETED | OUTPATIENT
Start: 2022-01-01 | End: 2022-01-01

## 2022-01-01 RX ORDER — OSELTAMIVIR PHOSPHATE 30 MG/1
30 CAPSULE ORAL ONCE
Status: COMPLETED | OUTPATIENT
Start: 2022-01-01 | End: 2022-01-01

## 2022-01-01 RX ADMIN — ACETAMINOPHEN 975 MG: 325 TABLET ORAL at 18:05

## 2022-01-01 RX ADMIN — OSELTAMIVIR PHOSPHATE 30 MG: 30 CAPSULE ORAL at 21:02

## 2022-01-01 RX ADMIN — IPRATROPIUM BROMIDE AND ALBUTEROL SULFATE 3 ML: 2.5; .5 SOLUTION RESPIRATORY (INHALATION) at 18:05

## 2022-01-01 ASSESSMENT — PAIN SCALES - GENERAL
PAINLEVEL: NO PAIN (0)

## 2022-01-01 ASSESSMENT — ENCOUNTER SYMPTOMS
WEAKNESS: 1
COUGH: 0
CONFUSION: 0
VOMITING: 0
FEVER: 0
CHILLS: 0
HEMATURIA: 0
JOINT SWELLING: 0
ABDOMINAL PAIN: 0
DIARRHEA: 0
DYSURIA: 0
SHORTNESS OF BREATH: 1
NAUSEA: 0
DIZZINESS: 0
SORE THROAT: 0

## 2022-01-01 ASSESSMENT — ACTIVITIES OF DAILY LIVING (ADL)
ADLS_ACUITY_SCORE: 37

## 2022-01-20 NOTE — PROGRESS NOTES
[facility]    Hospitalist Progress Note    Assessment & Plan   Tad Manning is a 80 year old male with extensive past medical history that is most notable for CLL, CAD, chronic systolic CHF, PAD status post right lower extremity BKA, Type 2 Diabetes mellitus, and chronic atrial fibrillation on Eliquis, among multiple others; who presents with recurrent falls and generalized weakness and is found to have possible ACS.     Possible ACS and acute metabolic encephalopathy  Ischemic cardiomyopathy--worsening, EF 20-25%  Of note, he has multi-vessel CAD, status post CABG (L-LAD, SVG-OM1, SVG-D1) in 1995, followed more recently by Left Heart catheterization (done pre-operatively) in 3/2018 which reportedly showed moderate disease in his RCA, severe LAD disease and patent grafts: he had two drug eluting stents placed in the proximal and mid LAD. Right sided catheterization done concomitantly showed elevated right and left sided pressures with type II pulmonary hypertension. he also has known ischemic cardiomyopathy causing chronic systolic CHF (Stage C, NYHA 3), most recently having LVEF 35-40% on TTE in 2018. He is followed by Dr. Valdes of [facility]. He presents for falls at home, with reported increased exertional dyspnea and confusion. Troponin is positive in the ED. He could have ACS, though alternatively he could have demand ischemia due to other cause of acute illness such as occult infection. Acute systolic CHF exacerbation is possible, though he appeared hypovolemic in the ED initially and IV fluid was given. Last, we note that he just resumed chemotherapy as below for CLL and this could be playing a role in his weakness. We note that UA and XR are negative for signs of infection there. There are no other evident acute neurologic deficits at this time to suggest stroke. He could have underlying as yet undiagnosed dementia  Trop 2.304-->5.523-->5.095-->3.629.  BNP 7364. Lipid  [place] is requesting a refill on the following medication(s):  Requested Prescriptions     Pending Prescriptions Disp Refills    fluticasone-salmeterol (ADVAIR DISKUS) 250-50 MCG/DOSE AEPB 1 each 3     Sig: inhale 1 puff by mouth every 12 hours       Last Visit Date (If Applicable):  65/6/2895    Next Visit Date:    3/3/2022 profile with HDL 38, LDL 49, total cholesterol 116  * 6/20 echocardiogram: EF 20-25%, RV normal, LA moderately dilated. Aortic valve sclerosis. Mild mitral regurgitation   * 6/22: severe known 3 vessel CAD. Does have 70% lesion in SVG, but does not explain depressed EF  -- cardiology consult appreciated now signed off  - Cr up to 1.5.   -doing well. Cr stable at 1.4   -wt seems stable. Seems stable. Wt of 62kg seems outlier.  -will start entresto 24-26mg tab one bid for now 48 hours after last ace inh dose. Discussed with cardiology  -has been feeling well. No cp or sob. Appears euvolemic.     Plan;   -- Resumed ASA  -- Digoxin level low at 0.4--there is question of compliance outpatient, resumed PTA dose 6/20   -- continue PTA toprol XL 100mg, lisinopril -->increased to 10mg once daily on 6/21  ---start entresto 24-26mg tab one bid 48 hours after last ace inh dose on 6/24. Discussed with cardiology  -- continue PTA lasix 20mg daily  -- continue atorvastatin 20mg daily  -- Fall precautions   -- plan for cardiac event monitor on discharge  --plan for medical optimization and then follow up in cardiology outpatient setting in 1 week per cardiology  -am bmp,      CLL  -Followed by [facility], treated with steroids for hemolytic anemia in 2012 and received chemotherapy at that time. More recently in the past year he has been found to have recurrence. In 4/2021 it seems he saw his [facility] team in follow up and since then has started Venetoclax. Not related to CM.  -Has been receiving Venetoclax and Rituxan  - Overall blood counts are stable  -- oncology appreciated  -- continue PTA venetoclax at 100mg daily---> Dr. England/oncology to modify dose to 200 mg daily. Per oncology, this can be held at U if needed, but if allowed since he has the prescription it would be okay to continue.  -allopurinol 300mg daily  -outpatient follow up  -follow blood counts  -Was due for rituximab 6/24 and this can be held for now per  oncology  -  Continue [facility] follow up after TCU to resume treatment. Tentative orders for 7/8, but that can be changed depending TCU stay.    PAD  He is status post a left above-knee popliteal to below-knee popliteal bypass in 2014.  He then underwent a R leg below knee amputation in Oct 2018. He last saw Dr. Vick of Vascular Surgery in 12/2020 (at a virtual visit).   - Noted.  Stump site appears nl.     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 Noted.  -- await PT/OT evaluations     Paroxysmal atrial fibrillation  CHADS-VASC noted to be at least 6. He is meant to be on Eliquis but it seems he has been mis-adherent to medications at home.  -- eliquis to resumed on 6/23 AM     Hypertension  - resumed PTA lisinopril, increased to 10mg daily, continued toprol XL 100mg daily  -good control.   (may consider swap to entresto from lisinopril if reasonable cost)  -cardiology outpt follow up, pcp follow up     Dyslipidemia  - lipitor resumed at 20mg. Lipid profile with HDL 38, LDL 49, total cholesterol 116  -lipids in 6 weeks     Type 2 Diabetes mellitus  On Lantus, Metformin as an outpatient. His last admission here was 1/2020 for hyperglycemia and STORM and started on insulin at that time. Currently FS near 500. He does not appear to have been consistently taking his medications at home. A1C is 9.   BS still in 200-300 range.   Plan;   -- medium resistance sliding scale insulin  -- 6 units TIDWM novolog---> increase to 9/11/11 dosing on 6/25.   -- 25 units lantus at bedtime 6/22  -- pharmacy liaison checking into SGLT2 inhibitors- Jardiance $28/month, invokana $27/ month. steglatro not covered, Farxiga not covered  -calculated CrCL by wt of 70Kg is ~43. Invokana could be an option  for patient but defer to PCP- also note pt with PVD and prior BKA. Reports of risk for amputation with this drug need to be considered.  CrCL precludes other SGLT2 inh.   -will need to ensure stable renal fx outpatient given initiation of Entresto.      Pseudohyponatremia  Corrects to 136. Low sodium related to high BGs on admit     Chronic anemia, thrombocytopenia  Noted, seems near baseline  Daily iron resumed     Stage 3 CKD  Cr 1.4 which seems to be very near his recent previous values.   Cr stable at 1.4 on ace inh  Calculated CrCL 43  - Daily BMP  Changing to Entresto 6/26 and ace inh stopped.   Follow up cardiology   Bmp at TCU and weekly at tcu     Chronic Stage 2 sacral decubitus ulcers  Present on admission.   - WOC consulted, seen 6/21.   Orders in place      BPH  By history. Noted.      Depression  PTA Celexa continued   - PTA trazodone resumed at reduced dose, 50mg (instead of 150mg), given falls/confusion     GERD  PTA PPI, not used recently  - resumed     COVID-19 screening-negative     DVT Prophylaxis: heparin gtt  Code Status: Full Code  Expected discharge:TCU once bed available. SW consulted, still waiting on bed acceptance. Unlikely discharge over weekend.     Pt at high risk for rehospitalization and has demonstrated inability to care for self independently at this apartment. Discussed with pt's niece Mily Manning who agrees with longterm structured living situation such as LTC bed at NH or assisted living with medication support, RN support, meals provided, assistance with ADLs. Discussed with pt      Efren Miller MD  Text Page  (7am to 6pm)  Interval History   No RN concerns  BS running high. No n/v/d/abd pain. No cp or sob    -Data reviewed today: I reviewed all new labs and imaging results over the last 24 hours. I personally reviewed imaging and labs last 24 hours    Physical Exam   Temp: 97.7  F (36.5  C) Temp src: Oral BP: 136/88 Pulse: 77   Resp: 16 SpO2: 98 % O2 Device: None  (Room air)    Vitals:    06/22/21 0600 06/23/21 0416 06/25/21 0629   Weight: 74.1 kg (163 lb 4.8 oz) 62.1 kg (137 lb) 71.1 kg (156 lb 12.8 oz)     Vital Signs with Ranges  Temp:  [97.7  F (36.5  C)-98  F (36.7  C)] 97.7  F (36.5  C)  Pulse:  [61-77] 77  Resp:  [15-20] 16  BP: (116-159)/(26-88) 136/88  SpO2:  [98 %-100 %] 98 %  I/O last 3 completed shifts:  In: 840 [P.O.:840]  Out: 2095 [Urine:2095]    Constitutional: In bed, nad  Respiratory: CTAB  Cardiovascular: RRR no r/g/m  GI: soft, nt, nd  Skin/Integumen: no LE edema. RBKA site looks fine  Neuro: nl speech and mentation.   Psych: nl affect       Medications     Continuing ACE inhibitor/ARB/ARNI from home medication list OR ACE inhibitor/ARB order already placed during this visit       - MEDICATION INSTRUCTIONS -       - MEDICATION INSTRUCTIONS -       - MEDICATION INSTRUCTIONS -         allopurinol  300 mg Oral Daily     apixaban ANTICOAGULANT  5 mg Oral BID     aspirin  81 mg Oral Daily     atorvastatin  20 mg Oral Daily     citalopram  20 mg Oral QPM     digoxin  125 mcg Oral Daily     furosemide  20 mg Oral Daily     insulin aspart  9 Units Subcutaneous Daily with breakfast     insulin aspart  11 Units Subcutaneous Daily with lunch     insulin aspart  11 Units Subcutaneous Daily with supper     insulin aspart  1-7 Units Subcutaneous TID AC     insulin aspart  1-5 Units Subcutaneous At Bedtime     insulin glargine  25 Units Subcutaneous At Bedtime     metoprolol succinate ER  100 mg Oral Daily     pantoprazole  40 mg Oral Daily     [START ON 6/26/2021] sacubitril-valsartan  1 tablet Oral BID     sodium chloride (PF)  3 mL Intracatheter Q8H     venetoclax  200 mg Oral Daily with lunch       Data   Recent Labs   Lab 06/24/21  0535 06/23/21  0535 06/22/21  0532 06/20/21  1209 06/20/21  1209 06/20/21  0707 06/20/21  0336 06/19/21  2237   WBC 3.1* 2.9* 3.0*   < >  --  3.6* 3.7* 4.8   HGB 9.2* 9.6* 9.9*   < >  --  10.0* 9.9* 11.2*   MCV 94 95 93   < >  --  92 91  91   * 102* 114*   < >  --  116* 131* 145*    134 133   < >  --  134  --  128*   POTASSIUM 3.8 3.9 3.9   < >  --  3.9  --  4.4   CHLORIDE 108 106 105   < >  --  102  --  95   CO2 24 22 21   < >  --  24  --  24   BUN 33* 32* 36*   < >  --  31*  --  33*   CR 1.40* 1.59* 1.34*   < >  --  1.36*  --  1.40*   ANIONGAP 5 6 7   < >  --  8  --  9   JUSTINO 9.1 8.9 9.2   < >  --  9.5  --  10.0   * 311* 263*   < >  --  283*  --  487*   ALBUMIN  --   --   --   --   --  3.3*  --  4.0   PROTTOTAL  --   --   --   --   --  6.7*  --  7.7   BILITOTAL  --   --   --   --   --  0.9  --  1.1   ALKPHOS  --   --   --   --   --  72  --  84   ALT  --   --   --   --   --  24  --  26   AST  --   --   --   --   --  34  --  32   TROPI  --   --   --   --  3.629* 5.095* 5.523* 2.304*    < > = values in this interval not displayed.       Imaging:   No results found for this or any previous visit (from the past 24 hour(s)).

## 2022-01-23 DIAGNOSIS — I50.22 CHRONIC SYSTOLIC HEART FAILURE (H): ICD-10-CM

## 2022-01-23 DIAGNOSIS — I50.9 ACUTE ON CHRONIC CONGESTIVE HEART FAILURE, UNSPECIFIED HEART FAILURE TYPE (H): ICD-10-CM

## 2022-01-27 RX ORDER — FUROSEMIDE 40 MG
TABLET ORAL
Qty: 90 TABLET | Refills: 0 | Status: SHIPPED | OUTPATIENT
Start: 2022-01-27 | End: 2022-03-09

## 2022-01-27 NOTE — TELEPHONE ENCOUNTER
Last Clinic Visit:  12/3/21   NV:  2/8/22  90 DAY RF   > CR   REVIEWED   Creatinine   Date Value Ref Range Status   12/30/2021 1.68 (H) 0.70 - 1.30 mg/dL Final   06/28/2021 1.33 (H) 0.66 - 1.25 mg/dL Final

## 2022-02-02 ENCOUNTER — TELEPHONE (OUTPATIENT)
Dept: VASCULAR SURGERY | Facility: CLINIC | Age: 82
End: 2022-02-02
Payer: COMMERCIAL

## 2022-02-02 DIAGNOSIS — I50.9 ACUTE ON CHRONIC CONGESTIVE HEART FAILURE, UNSPECIFIED HEART FAILURE TYPE (H): Primary | ICD-10-CM

## 2022-02-02 NOTE — TELEPHONE ENCOUNTER
Representative from [facility] calling to request more clinical information before able to process the request for diabetic shoes.  He states they need a copy of the prescription as well as the clinical notes explaining the need for the shoes.      The fax number is:  [phone]  The fax can be attn the case number. The case number is [id number].

## 2022-02-07 NOTE — PROGRESS NOTES
In person appointment    HPI:   Mr. Manning is a 81 year old male with a past medical history including CLL (ongoing treatment), CAD s/p CABG in 1995, HFrEF 2/2 ICM, atrial flutter, PVD s/p iliac artery stents and left lower extremity bypass, hypertension, diabetes, hyperlipidemia, prosthesis on leg for about 2-3 years now. discitis and multple admission for decompensated heart failure. Presents to clinic for CORE f/u.    He last saw CORE 12/3/21. His aldactone was increased. He got an extra dose of lasix for 2 days.     He is feeling okay today.  No SOB at rest. Walking from his apartment down to meals is about 1.5 blocks and he feels he can tolerated not winded. He thinks he can go 6-7 blocks. Weights have been ranging 191-192. LE edema is not too bad. Prosthesis on r leg. No abdominal edema. He denies orthopnea, PND. No lightheadedness or dizziness. No palpitations or syncope. No chest pain. Appetite is good.    The doctor at [facility] comes about once per month.     Cardiac Medications  Doesn't have medcation list with him.    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)      Chronic systolic heart failure (H) 6/6/2017     Confusion 6/20/2021     Coronary artery disease     triple bypass 1995     CRF (chronic renal failure), stage 3 (moderate) (H) 1/20/2017     Critical lower limb ischemia (H) 1/10/2014     Diabetes mellitus (H)      Diabetes mellitus, type 2 (H) 1/11/2014     Diabetic  polyneuropathy (H)      Discitis 2017     Epidural abscess 2017     Fall 2017     History of blood transfusion      HTN (hypertension) 2014     Hyperglycemia 2020     Hyperlipidaemia      Hyperlipidemia with target LDL less than 100 2014    Formatting of this note might be different from the original. Overview:  Diagnosis updated by automated process. Provider to review and confirm. Formatting of this note might be different from the original. Diagnosis updated by automated process. Provider to review and confirm.     Hypertension      Long term current use of anticoagulant therapy 2018     Non-healing ulcer of foot (H)     Right     Noninflammatory pericardial effusion      Osteomyelitis (H) 2014     Osteomyelitis of foot, right, acute (H) 2/15/2017     Osteomyelitis of left foot (H)      PVD (peripheral vascular disease) (H)      Recurrent falls 2021     Sebaceous cyst        FAMILY HISTORY:  Family History   Problem Relation Age of Onset     Cancer Mother         leukemia     Leukemia Mother        SOCIAL HISTORY:  Socioeconomic History     Marital status: Single   Tobacco Use     Smoking status: Former Smoker     Packs/day: 2.00     Years: 30.00     Pack years: 60.00     Types: Cigarettes     Last attempt to quit: 1995     Years since quittin.5     Smokeless tobacco: Never Used   Other Topics Concern     Parent/sibling w/ CABG, MI or angioplasty before 65F 55M? Not Asked   Social History Narrative    Lives independently with SO. 2017        CURRENT MEDICATIONS:  acetaminophen (TYLENOL) 325 MG tablet, Take 650 mg by mouth every 4 hours as needed for mild pain or fever  alcohol swab prep pads, Use to swab area of injection/jina as directed.  apixaban ANTICOAGULANT (ELIQUIS ANTICOAGULANT) 5 MG tablet, Take 1 tablet (5 mg) by mouth 2 times daily  aspirin (ASA) 81 MG chewable tablet, Take 81 mg by mouth daily   atorvastatin (LIPITOR) 20 MG tablet, Take 1 tablet  (20 mg) by mouth daily  Mahsa Protect (EUCERIN) external cream, Apply topically 2 times daily as needed for dry skin or other Apply as needed to intact stage 1 pressure area and/or irritated skin, rash or excoriation  bisacodyl (DULCOLAX) 10 MG suppository, Place 10 mg rectally daily as needed for constipation If no BM or small BM for 3 days/9 shifts  blood glucose (NO BRAND SPECIFIED) test strip, Use to test blood sugar 4 times daily or as directed.  blood glucose calibration (NO BRAND SPECIFIED) solution, 1 drop every 14 days Use 1 drop to calibrate blood glucose monitor every 14 days  blood glucose monitoring (NO BRAND SPECIFIED) meter device kit, Use to test blood sugar 4 times daily or as directed.  cholecalciferol 1000 UNITS TABS, Take 1,000 Units by mouth daily  citalopram (CELEXA) 20 MG tablet, Take 20 mg by mouth every morning   digoxin (LANOXIN) 125 MCG tablet, Take 1 tablet (125 mcg) by mouth daily  furosemide (LASIX) 20 MG tablet, Take 2 tablets (40 mg) by mouth daily Take extra 40mg dose of lasix daily for 2 days  furosemide (LASIX) 40 MG tablet, TAKE 1 TABLET BY MOUTH ONCE DAILY  guaiFENesin (ROBITUSSIN) 100 MG/5ML SYRP, Take 10 mLs by mouth every 4 hours as needed for cough For cold symptoms and non productive cough  insulin glargine (LANTUS PEN) 100 UNIT/ML pen, Inject 30 Units Subcutaneous At Bedtime  insulin lispro (HUMALOG KWIKPEN) 100 UNIT/ML (1 unit dial) KWIKPEN, Inject 12 Units Subcutaneous 2 times daily (before meals) Breakfast and lunch  insulin lispro (HUMALOG KWIKPEN) 100 UNIT/ML (1 unit dial) KWIKPEN, Inject 14 Units Subcutaneous daily (with dinner)  loperamide (IMODIUM A-D) 2 MG tablet, Take 2-4 mg by mouth 4 times daily as needed for diarrhea Take 4 mg after 1st loose stool, then take 1 tablet as needed after each loose stool  magnesium hydroxide (MILK OF MAGNESIA) 400 MG/5ML suspension, Take 30 mLs by mouth daily as needed for constipation or heartburn If no BM/small BM for 3 days/9  shifts  metFORMIN (GLUCOPHAGE-XR) 750 MG 24 hr tablet, Take 750 mg by mouth every morning  metoprolol succinate ER (TOPROL-XL) 100 MG 24 hr tablet, Take 1 tablet (100 mg) by mouth daily  neomycin-bacitracin-polymyxin (NEOSPORIN) 5-400-5000 ointment, Apply 1 each topically 3 times daily as needed (for minor abrasions)   nitroglycerin (NITROSTAT) 0.4 MG sublingual tablet, For chest pain place 1 tablet under the tongue every 5 minutes for 3 doses. If symptoms persist 5 minutes after 1st dose call 911.  nystatin (MYCOSTATIN) 339159 UNIT/GM external powder, Apply topically 2 times daily as needed (to affected area(s) as needed)  order for DME, Equipment to be ordered:  Scale- Qty 1  Commander Flex - Qty. 1  Pulse-Oximeter - Qty. 1  Use as directed  order for DME, Equipment being ordered: [facility] shoe  pantoprazole (PROTONIX) 40 MG EC tablet, Take 40 mg by mouth daily  polyethylene glycol (MIRALAX) 17 GM/Dose powder, Take 17 g by mouth daily as needed  sacubitril-valsartan (ENTRESTO)  MG per tablet, Take 1 tablet by mouth 2 times daily  spironolactone (ALDACTONE) 25 MG tablet, Take 1 tablet (25 mg) by mouth daily  thin (NO BRAND SPECIFIED) lancets, 1 each 4 times daily Use with lanceting device.  traZODone (DESYREL) 50 MG tablet, Take 50 mg by mouth At Bedtime  ULTICARE SHORT 31G X 8 MM insulin pen needle,   venetoclax (VENCLEXTA) 100 MG tablet, Take 200 mg by mouth daily    No current facility-administered medications on file prior to visit.      ROS:   See HPI    EXAM:   /54   Pulse 71   Wt 86.7 kg (191 lb 1.6 oz)   SpO2 99%   BMI 27.05 kg/m       GENERAL: Appears comfortable, in no acute distress.   HEENT: Eye symmetrical, no discharge or icterus bilaterally. Mucous membranes moist and without lesions.  CV: RRR, +S1S2, no murmur, rub, or gallop. JVP just above clavicle at 90 degrees.   RESPIRATORY: Respirations regular, even, and unlabored. Lungs CTA throughout.   GI: Soft and non distended with normoactive  bowel sounds present in all quadrants. No tenderness, rebound, guarding. No hepatomegaly.   EXTREMITIES: AKA of the right leg. Left leg with trace edema. Left leg is warm and well perfused.  NEUROLOGIC: Alert and interacting appropriately. No focal deficits.   MUSCULOSKELETAL: No joint swelling or tenderness.   SKIN: No jaundice. No rashes or lesions.     Labs, reviewed with patient in clinic today:  CBC RESULTS:  Lab Results   Component Value Date    WBC 3.0 (L) 09/25/2021    WBC 3.7 (L) 06/26/2021    RBC 3.40 (L) 09/25/2021    RBC 3.09 (L) 06/26/2021    HGB 10.4 (L) 09/25/2021    HGB 9.6 (L) 06/26/2021    HCT 32.2 (L) 09/25/2021    HCT 28.6 (L) 06/26/2021    MCV 95 09/25/2021    MCV 93 06/26/2021    MCH 30.6 09/25/2021    MCH 31.1 06/26/2021    MCHC 32.3 09/25/2021    MCHC 33.6 06/26/2021    RDW 16.9 (H) 09/25/2021    RDW 14.6 06/26/2021    PLT 99 (L) 09/25/2021     (L) 06/26/2021       CMP RESULTS:  Lab Results   Component Value Date     02/08/2022     06/28/2021    POTASSIUM 4.5 02/08/2022    POTASSIUM 4.1 06/28/2021    CHLORIDE 105 02/08/2022    CHLORIDE 109 06/28/2021    CO2 25 02/08/2022    CO2 24 06/28/2021    ANIONGAP 8 02/08/2022    ANIONGAP 4 06/28/2021     (H) 02/08/2022     (H) 06/28/2021    BUN 39 (H) 02/08/2022    BUN 43 (H) 06/28/2021    CR 1.71 (H) 02/08/2022    CR 1.33 (H) 06/28/2021    GFRESTIMATED 40 (L) 02/08/2022    GFRESTIMATED >60 07/08/2021    GFRESTIMATED 51 (L) 06/28/2021    GFRESTBLACK >60 07/08/2021    GFRESTBLACK 59 (L) 06/28/2021    JUSTINO 9.5 02/08/2022    JUSTINO 9.4 06/28/2021    BILITOTAL 0.8 09/23/2021    BILITOTAL 0.9 06/20/2021    ALBUMIN 3.0 (L) 09/23/2021    ALBUMIN 3.3 (L) 06/20/2021    ALKPHOS 84 09/23/2021    ALKPHOS 72 06/20/2021    ALT 12 09/23/2021    ALT 24 06/20/2021    AST 12 09/23/2021    AST 34 06/20/2021        INR RESULTS:  Lab Results   Component Value Date    INR 1.05 04/16/2021       Lab Results   Component Value Date    MAG 1.8  09/25/2021    MAG 2.0 06/23/2021     Lab Results   Component Value Date    NTBNPI 7,364 (H) 06/20/2021     Lab Results   Component Value Date    NTBNP 22,172 (H) 02/09/2018       Diagnostics:   TTE 6/20/21  1. Left ventricular systolic function is severely reduced. The visual ejection  fraction is estimated at 20-25%.  2. The right ventricle is normal in structure, function and size.  3. The left atrium is moderately dilated.  4. The aortic valve is trileaflet with aortic valve sclerosis.  5. There is mild (1+) mitral regurgitation.  ______________________________________________________________________________      TTE 10/5/18  The visual ejection fraction is estimated at 35-40%.  There is mild-moderate global hypokinesia of the left ventricle.  Mildly decreased right ventricular systolic function  In comparison with the previous study the LVEF has improved slightly.    Assessment and Plan:   Mr. Manning is a 81 year old male with a past medical history including CLL in remission, CAD s/p CABG in 1995, HFrEF 2/2 ICM, atrial flutter, PVD s/p iliac artery stents and left lower extremity bypass, hypertension, diabetes, hyperlipidemia, prosthesis on leg for about 2-3 years now. discitis and multple admissions for decompensated heart failure. Presents to clinic for CORE f/u.    Patient is recently moved into assisted living which is led to a lot more consistency in taking his medications. No hospitalizations since December. His EF has worsened on his most recent echo.  It is now 20 to 25%.  We are titrating his neurohormonal blockade. Then we will recheck an echo. If he remains below 35% we will need to discuss ICD.    # Chronic systolic heart failure/HFrEF secondary to ICM  (Ef 20-25%)  Stage C. NYHA Class IIIA  Fluid status: euvolemic, continue current diuretics  ACEi/ARB/ARNI: Entresto 97/103 BID -max dose acheived  BB: metoprolol  mg daily, ongoing titration  Aldosterone antagonist: Aldactone 25 mg  SGLT2i-  defered while other medications are prioretized  SCD prophylaxis: n/a EF now below 35%- will need to consider if not improving with CHF med titration  NSAID use: contraindicated  Sleep apnea evaluation: not discussed today  Remote monitoring: consider at future visits, although now lives at an assisted living where he has more supervision    # CAD s/p CABG  # PAD s/p iliac stenting and LE bypass  - No anginal symptoms, he is on asa and statin (defer high potency to cardiologist); he has stable claudication    # Atrial flutter  - He is on BB and digoxin and warfarin    # CKD stage 3a  - Cr stable at his baseline    Plan:  Pending medication list from assisted living.  CORe in 1 month  Dr. Leon as scheduled in May    Billing  - I spent 10 minutes face to face with the patient and an additional 13 minutes coordinating care, obtaining medication list, and completing documentation on the day of service        Carly Lomas PA-C  [facility] Cardiology  [facility]  F[facility] LEON

## 2022-02-08 ENCOUNTER — LAB (OUTPATIENT)
Dept: LAB | Facility: CLINIC | Age: 82
End: 2022-02-08
Attending: PHYSICIAN ASSISTANT
Payer: COMMERCIAL

## 2022-02-08 ENCOUNTER — OFFICE VISIT (OUTPATIENT)
Dept: CARDIOLOGY | Facility: CLINIC | Age: 82
End: 2022-02-08
Attending: PHYSICIAN ASSISTANT
Payer: COMMERCIAL

## 2022-02-08 VITALS
DIASTOLIC BLOOD PRESSURE: 54 MMHG | SYSTOLIC BLOOD PRESSURE: 117 MMHG | OXYGEN SATURATION: 99 % | BODY MASS INDEX: 27.05 KG/M2 | HEART RATE: 71 BPM | WEIGHT: 191.1 LBS

## 2022-02-08 DIAGNOSIS — I50.9 ACUTE ON CHRONIC CONGESTIVE HEART FAILURE, UNSPECIFIED HEART FAILURE TYPE (H): ICD-10-CM

## 2022-02-08 DIAGNOSIS — I50.22 CHRONIC SYSTOLIC HEART FAILURE (H): ICD-10-CM

## 2022-02-08 LAB
ANION GAP SERPL CALCULATED.3IONS-SCNC: 8 MMOL/L (ref 3–14)
BUN SERPL-MCNC: 39 MG/DL (ref 7–30)
CALCIUM SERPL-MCNC: 9.5 MG/DL (ref 8.5–10.1)
CHLORIDE BLD-SCNC: 105 MMOL/L (ref 94–109)
CO2 SERPL-SCNC: 25 MMOL/L (ref 20–32)
CREAT SERPL-MCNC: 1.71 MG/DL (ref 0.66–1.25)
GFR SERPL CREATININE-BSD FRML MDRD: 40 ML/MIN/1.73M2
GLUCOSE BLD-MCNC: 367 MG/DL (ref 70–99)
POTASSIUM BLD-SCNC: 4.5 MMOL/L (ref 3.4–5.3)
SODIUM SERPL-SCNC: 138 MMOL/L (ref 133–144)

## 2022-02-08 PROCEDURE — G0463 HOSPITAL OUTPT CLINIC VISIT: HCPCS

## 2022-02-08 PROCEDURE — 36415 COLL VENOUS BLD VENIPUNCTURE: CPT | Performed by: PATHOLOGY

## 2022-02-08 PROCEDURE — 99214 OFFICE O/P EST MOD 30 MIN: CPT | Performed by: PHYSICIAN ASSISTANT

## 2022-02-08 PROCEDURE — 80048 BASIC METABOLIC PNL TOTAL CA: CPT | Performed by: PATHOLOGY

## 2022-02-08 ASSESSMENT — PAIN SCALES - GENERAL: PAINLEVEL: NO PAIN (0)

## 2022-02-08 NOTE — PATIENT INSTRUCTIONS
Take your medicines every day, as directed    Changes made today:  o No medication changes today     Monitor Your Weight and Symptoms    Contact us if you:      Gain 2 pounds in one day or 5 pounds in one week    Feel more short of breath    Notice more leg swelling    Feel lightheadeded   Change your lifestyle    Limit Salt or Sodium:    2000 mg  Limit Fluids:    2000 mL or approximately 64 ounces  Eat a Heart Healthy Diet    Low in saturated fats  Stay Active:    Aim to move at least 150 minutes every  week         To Contact us    During Business Hours:  [phone], option # 1      After hours, weekends or holidays:   [phone], Option #4  Ask to speak to the On-Call Cardiologist. Inform them you are a CORE/heart failure patient at the [facility].     Use [employer] allows you to communicate directly with your heart team through secure messaging.    [email or web address] can be accessed any time on your phone, computer, or tablet.    If you need assistance, we'd be happy to help!         Keep your Heart Appointments:    - CORE clinic in 1 month  - Dr. Valdes as scheduled in May

## 2022-02-08 NOTE — LETTER
2/8/2022      RE: Tad Manning  [address] [place] [id number]       Dear Colleague,    Thank you for the opportunity to participate in the care of your patient, Tad Manning, at the [facility] [place] [facility]. Please see a copy of my visit note below.    In person appointment    HPI:   Mr. Manning is a 81 year old male with a past medical history including CLL (ongoing treatment), CAD s/p CABG in 1995, HFrEF 2/2 ICM, atrial flutter, PVD s/p iliac artery stents and left lower extremity bypass, hypertension, diabetes, hyperlipidemia, prosthesis on leg for about 2-3 years now. discitis and multple admission for decompensated heart failure. Presents to clinic for CORE f/u.    He last saw CORE 12/3/21. His aldactone was increased. He got an extra dose of lasix for 2 days.     He is feeling okay today.  No SOB at rest. Walking from his apartment down to meals is about 1.5 blocks and he feels he can tolerated not winded. He thinks he can go 6-7 blocks. Weights have been ranging 191-192. LE edema is not too bad. Prosthesis on r leg. No abdominal edema. He denies orthopnea, PND. No lightheadedness or dizziness. No palpitations or syncope. No chest pain. Appetite is good.    The doctor at [facility] comes about once per month.     Cardiac Medications  Doesn't have medcation list with him.    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 (H)      Chronic systolic heart failure (H) 2017     Confusion 2021     Coronary artery disease     triple bypass      CRF (chronic renal failure), stage 3 (moderate) (H) 2017     Critical lower limb ischemia (H) 1/10/2014     Diabetes mellitus (H)      Diabetes mellitus, type 2 (H) 2014     Diabetic polyneuropathy (H)      Discitis 2017     Epidural abscess 2017     Fall 2017     History of blood transfusion      HTN (hypertension) 2014     Hyperglycemia 2020     Hyperlipidaemia      Hyperlipidemia with target LDL less than 100 2014    Formatting of this note might be different from the original. Overview:  Diagnosis updated by automated process. Provider to review and confirm. Formatting of this note might be different from the original. Diagnosis updated by automated process. Provider to review and confirm.     Hypertension      Long term current use of anticoagulant therapy 2018     Non-healing ulcer of foot (H)     Right     Noninflammatory pericardial effusion      Osteomyelitis (H) 2014     Osteomyelitis of foot, right, acute (H) 2/15/2017     Osteomyelitis of left foot (H)      PVD (peripheral vascular disease) (H)      Recurrent falls 2021     Sebaceous cyst        FAMILY HISTORY:  Family History   Problem Relation Age of Onset     Cancer Mother         leukemia     Leukemia Mother        SOCIAL HISTORY:  Socioeconomic History     Marital status: Single   Tobacco Use     Smoking status: Former Smoker     Packs/day: 2.00     Years: 30.00     Pack years: 60.00     Types: Cigarettes     Last attempt to quit: 1995     Years since quittin.5     Smokeless tobacco: Never Used   Other Topics Concern     Parent/sibling w/ CABG, MI or angioplasty before 65F 55M? Not Asked   Social History Narrative    Lives independently with SO. 2017        CURRENT MEDICATIONS:  acetaminophen (TYLENOL) 325 MG tablet, Take 650 mg by  mouth every 4 hours as needed for mild pain or fever  alcohol swab prep pads, Use to swab area of injection/jina as directed.  apixaban ANTICOAGULANT (ELIQUIS ANTICOAGULANT) 5 MG tablet, Take 1 tablet (5 mg) by mouth 2 times daily  aspirin (ASA) 81 MG chewable tablet, Take 81 mg by mouth daily   atorvastatin (LIPITOR) 20 MG tablet, Take 1 tablet (20 mg) by mouth daily  Mahsa Protect (EUCERIN) external cream, Apply topically 2 times daily as needed for dry skin or other Apply as needed to intact stage 1 pressure area and/or irritated skin, rash or excoriation  bisacodyl (DULCOLAX) 10 MG suppository, Place 10 mg rectally daily as needed for constipation If no BM or small BM for 3 days/9 shifts  blood glucose (NO BRAND SPECIFIED) test strip, Use to test blood sugar 4 times daily or as directed.  blood glucose calibration (NO BRAND SPECIFIED) solution, 1 drop every 14 days Use 1 drop to calibrate blood glucose monitor every 14 days  blood glucose monitoring (NO BRAND SPECIFIED) meter device kit, Use to test blood sugar 4 times daily or as directed.  cholecalciferol 1000 UNITS TABS, Take 1,000 Units by mouth daily  citalopram (CELEXA) 20 MG tablet, Take 20 mg by mouth every morning   digoxin (LANOXIN) 125 MCG tablet, Take 1 tablet (125 mcg) by mouth daily  furosemide (LASIX) 20 MG tablet, Take 2 tablets (40 mg) by mouth daily Take extra 40mg dose of lasix daily for 2 days  furosemide (LASIX) 40 MG tablet, TAKE 1 TABLET BY MOUTH ONCE DAILY  guaiFENesin (ROBITUSSIN) 100 MG/5ML SYRP, Take 10 mLs by mouth every 4 hours as needed for cough For cold symptoms and non productive cough  insulin glargine (LANTUS PEN) 100 UNIT/ML pen, Inject 30 Units Subcutaneous At Bedtime  insulin lispro (HUMALOG KWIKPEN) 100 UNIT/ML (1 unit dial) KWIKPEN, Inject 12 Units Subcutaneous 2 times daily (before meals) Breakfast and lunch  insulin lispro (HUMALOG KWIKPEN) 100 UNIT/ML (1 unit dial) KWIKPEN, Inject 14 Units Subcutaneous daily (with  dinner)  loperamide (IMODIUM A-D) 2 MG tablet, Take 2-4 mg by mouth 4 times daily as needed for diarrhea Take 4 mg after 1st loose stool, then take 1 tablet as needed after each loose stool  magnesium hydroxide (MILK OF MAGNESIA) 400 MG/5ML suspension, Take 30 mLs by mouth daily as needed for constipation or heartburn If no BM/small BM for 3 days/9 shifts  metFORMIN (GLUCOPHAGE-XR) 750 MG 24 hr tablet, Take 750 mg by mouth every morning  metoprolol succinate ER (TOPROL-XL) 100 MG 24 hr tablet, Take 1 tablet (100 mg) by mouth daily  neomycin-bacitracin-polymyxin (NEOSPORIN) 5-400-5000 ointment, Apply 1 each topically 3 times daily as needed (for minor abrasions)   nitroglycerin (NITROSTAT) 0.4 MG sublingual tablet, For chest pain place 1 tablet under the tongue every 5 minutes for 3 doses. If symptoms persist 5 minutes after 1st dose call 911.  nystatin (MYCOSTATIN) 392169 UNIT/GM external powder, Apply topically 2 times daily as needed (to affected area(s) as needed)  order for DME, Equipment to be ordered:  Scale- Qty 1  Commander Flex - Qty. 1  Pulse-Oximeter - Qty. 1  Use as directed  order for DME, Equipment being ordered: DH2 shoe  pantoprazole (PROTONIX) 40 MG EC tablet, Take 40 mg by mouth daily  polyethylene glycol (MIRALAX) 17 GM/Dose powder, Take 17 g by mouth daily as needed  sacubitril-valsartan (ENTRESTO)  MG per tablet, Take 1 tablet by mouth 2 times daily  spironolactone (ALDACTONE) 25 MG tablet, Take 1 tablet (25 mg) by mouth daily  thin (NO BRAND SPECIFIED) lancets, 1 each 4 times daily Use with lanceting device.  traZODone (DESYREL) 50 MG tablet, Take 50 mg by mouth At Bedtime  ULTICARE SHORT 31G X 8 MM insulin pen needle,   venetoclax (VENCLEXTA) 100 MG tablet, Take 200 mg by mouth daily    No current facility-administered medications on file prior to visit.      ROS:   See HPI    EXAM:   /54   Pulse 71   Wt 86.7 kg (191 lb 1.6 oz)   SpO2 99%   BMI 27.05 kg/m       GENERAL: Appears  comfortable, in no acute distress.   HEENT: Eye symmetrical, no discharge or icterus bilaterally. Mucous membranes moist and without lesions.  CV: RRR, +S1S2, no murmur, rub, or gallop. JVP just above clavicle at 90 degrees.   RESPIRATORY: Respirations regular, even, and unlabored. Lungs CTA throughout.   GI: Soft and non distended with normoactive bowel sounds present in all quadrants. No tenderness, rebound, guarding. No hepatomegaly.   EXTREMITIES: AKA of the right leg. Left leg with trace edema. Left leg is warm and well perfused.  NEUROLOGIC: Alert and interacting appropriately. No focal deficits.   MUSCULOSKELETAL: No joint swelling or tenderness.   SKIN: No jaundice. No rashes or lesions.     Labs, reviewed with patient in clinic today:  CBC RESULTS:  Lab Results   Component Value Date    WBC 3.0 (L) 09/25/2021    WBC 3.7 (L) 06/26/2021    RBC 3.40 (L) 09/25/2021    RBC 3.09 (L) 06/26/2021    HGB 10.4 (L) 09/25/2021    HGB 9.6 (L) 06/26/2021    HCT 32.2 (L) 09/25/2021    HCT 28.6 (L) 06/26/2021    MCV 95 09/25/2021    MCV 93 06/26/2021    MCH 30.6 09/25/2021    MCH 31.1 06/26/2021    MCHC 32.3 09/25/2021    MCHC 33.6 06/26/2021    RDW 16.9 (H) 09/25/2021    RDW 14.6 06/26/2021    PLT 99 (L) 09/25/2021     (L) 06/26/2021       CMP RESULTS:  Lab Results   Component Value Date     02/08/2022     06/28/2021    POTASSIUM 4.5 02/08/2022    POTASSIUM 4.1 06/28/2021    CHLORIDE 105 02/08/2022    CHLORIDE 109 06/28/2021    CO2 25 02/08/2022    CO2 24 06/28/2021    ANIONGAP 8 02/08/2022    ANIONGAP 4 06/28/2021     (H) 02/08/2022     (H) 06/28/2021    BUN 39 (H) 02/08/2022    BUN 43 (H) 06/28/2021    CR 1.71 (H) 02/08/2022    CR 1.33 (H) 06/28/2021    GFRESTIMATED 40 (L) 02/08/2022    GFRESTIMATED >60 07/08/2021    GFRESTIMATED 51 (L) 06/28/2021    GFRESTBLACK >60 07/08/2021    GFRESTBLACK 59 (L) 06/28/2021    JUSTINO 9.5 02/08/2022    JUSTINO 9.4 06/28/2021    BILITOTAL 0.8 09/23/2021     BILITOTAL 0.9 06/20/2021    ALBUMIN 3.0 (L) 09/23/2021    ALBUMIN 3.3 (L) 06/20/2021    ALKPHOS 84 09/23/2021    ALKPHOS 72 06/20/2021    ALT 12 09/23/2021    ALT 24 06/20/2021    AST 12 09/23/2021    AST 34 06/20/2021        INR RESULTS:  Lab Results   Component Value Date    INR 1.05 04/16/2021       Lab Results   Component Value Date    MAG 1.8 09/25/2021    MAG 2.0 06/23/2021     Lab Results   Component Value Date    NTBNPI 7,364 (H) 06/20/2021     Lab Results   Component Value Date    NTBNP 22,172 (H) 02/09/2018       Diagnostics:   TTE 6/20/21  1. Left ventricular systolic function is severely reduced. The visual ejection  fraction is estimated at 20-25%.  2. The right ventricle is normal in structure, function and size.  3. The left atrium is moderately dilated.  4. The aortic valve is trileaflet with aortic valve sclerosis.  5. There is mild (1+) mitral regurgitation.  ______________________________________________________________________________      TTE 10/5/18  The visual ejection fraction is estimated at 35-40%.  There is mild-moderate global hypokinesia of the left ventricle.  Mildly decreased right ventricular systolic function  In comparison with the previous study the LVEF has improved slightly.    Assessment and Plan:   Mr. Manning is a 81 year old male with a past medical history including CLL in remission, CAD s/p CABG in 1995, HFrEF 2/2 ICM, atrial flutter, PVD s/p iliac artery stents and left lower extremity bypass, hypertension, diabetes, hyperlipidemia, prosthesis on leg for about 2-3 years now. discitis and multple admissions for decompensated heart failure. Presents to clinic for CORE f/u.    Patient is recently moved into assisted living which is led to a lot more consistency in taking his medications. No hospitalizations since December. His EF has worsened on his most recent echo.  It is now 20 to 25%.  We are titrating his neurohormonal blockade. Then we will recheck an echo. If he  remains below 35% we will need to discuss ICD.    # Chronic systolic heart failure/HFrEF secondary to ICM  (Ef 20-25%)  Stage C. NYHA Class IIIA  Fluid status: euvolemic, continue current diuretics  ACEi/ARB/ARNI: Entresto 97/103 BID -max dose acheived  BB: metoprolol  mg daily, ongoing titration  Aldosterone antagonist: Aldactone 25 mg  SGLT2i- defered while other medications are prioretized  SCD prophylaxis: n/a EF now below 35%- will need to consider if not improving with CHF med titration  NSAID use: contraindicated  Sleep apnea evaluation: not discussed today  Remote monitoring: consider at future visits, although now lives at an assisted living where he has more supervision    # CAD s/p CABG  # PAD s/p iliac stenting and LE bypass  - No anginal symptoms, he is on asa and statin (defer high potency to cardiologist); he has stable claudication    # Atrial flutter  - He is on BB and digoxin and warfarin    # CKD stage 3a  - Cr stable at his baseline    Plan:  Pending medication list from assisted living.  CORe in 1 month  Dr. Valdes as scheduled in May    Billing  - I spent 10 minutes face to face with the patient and an additional 13 minutes coordinating care, obtaining medication list, and completing documentation on the day of service        Carly Lomas PA-C  [facility]  [facility]      FABY ZURITA

## 2022-02-08 NOTE — NURSING NOTE
Chief Complaint   Patient presents with     Follow Up     2/8/22: Reason for visit - Return CORE, 81 year old male with chronic systolic heart failure presents for follow up with labs prior.      Vitals were taken and medications reconciled.    Freddie Hernandes, ASHKAN  9:00 AM

## 2022-02-09 NOTE — NURSING NOTE
Medication list received from Assisted Living. Reconciled with current Epic list. All heart medications listed match up. Only difference noted is insulin regimen and note made on medication list in Epic with updated dose.

## 2022-03-02 DIAGNOSIS — I50.22 CHRONIC SYSTOLIC HEART FAILURE (H): Primary | ICD-10-CM

## 2022-03-08 NOTE — PROGRESS NOTES
In person appointment    HPI:   Mr. Manning is a 81 year old male with a past medical history including CLL (ongoing treatment), CAD s/p CABG in 1995, HFrEF 2/2 ICM, atrial flutter, PVD s/p iliac artery stents and left lower extremity bypass, hypertension, diabetes, hyperlipidemia, prosthesis on leg for about 2-3 years now. discitis and multple admission for decompensated heart failure. Presents to clinic for CORE f/u.    He last saw CORE 12/3/21. His aldactone was increased. He got an extra dose of lasix for 2 days.     He is feeling good. They think he's had more energy. No SOB at rest. He thinks he can walk 1/2 mile to 1 mile before he feel ALEXIS. LE edema is not too bad. Prosthesis on r leg. He thinks he has some mild abdominal edema. He denies orthopnea, PND. He has some lightheadedness when he first wakes up and gets up. No dizziness. No palpitations or syncope. No chest pain. Appetite is good.    The doctor at [facility] comes about once per month.     Weights have been 182 on 3/5, 191 3/2, 192.6 on 2/23, 194 on 2/16, 2203 on 2/9, 2195 on 2/5 and 192.6 on 2/2.    Cardiac Medications  Eliquis 5 mg BID  Entresto  mg BID  Metoprolol succinate 100 mg daily  Lasix 40 mg daily  Aldactone 25 mg daily  Atorvastatin 20 mg daily    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)      Chronic systolic heart failure (H) 6/6/2017     Confusion 6/20/2021     Coronary artery disease     triple bypass       CRF (chronic renal failure), stage 3 (moderate) (H) 2017     Critical lower limb ischemia (H) 1/10/2014     Diabetes mellitus (H)      Diabetes mellitus, type 2 (H) 2014     Diabetic polyneuropathy (H)      Discitis 2017     Epidural abscess 2017     Fall 2017     History of blood transfusion      HTN (hypertension) 2014     Hyperglycemia 2020     Hyperlipidaemia      Hyperlipidemia with target LDL less than 100 2014    Formatting of this note might be different from the original. Overview:  Diagnosis updated by automated process. Provider to review and confirm. Formatting of this note might be different from the original. Diagnosis updated by automated process. Provider to review and confirm.     Hypertension      Long term current use of anticoagulant therapy 2018     Non-healing ulcer of foot (H)     Right     Noninflammatory pericardial effusion      Osteomyelitis (H) 2014     Osteomyelitis of foot, right, acute (H) 2/15/2017     Osteomyelitis of left foot (H)      PVD (peripheral vascular disease) (H)      Recurrent falls 2021     Sebaceous cyst        FAMILY HISTORY:  Family History   Problem Relation Age of Onset     Cancer Mother         leukemia     Leukemia Mother        SOCIAL HISTORY:  Socioeconomic History     Marital status: Single   Tobacco Use     Smoking status: Former Smoker     Packs/day: 2.00     Years: 30.00     Pack years: 60.00     Types: Cigarettes     Last attempt to quit: 1995     Years since quittin.5     Smokeless tobacco: Never Used   Other Topics Concern     Parent/sibling w/ CABG, MI or angioplasty before 65F 55M? Not Asked   Social History Narrative    Lives independently with SO. 2017        CURRENT MEDICATIONS:  acetaminophen (TYLENOL) 325 MG tablet, Take 650 mg by mouth every 4 hours as needed for mild pain or fever  alcohol swab prep pads, Use to swab area of injection/jina as directed.  apixaban  ANTICOAGULANT (ELIQUIS ANTICOAGULANT) 5 MG tablet, Take 1 tablet (5 mg) by mouth 2 times daily  aspirin (ASA) 81 MG chewable tablet, Take 81 mg by mouth daily   atorvastatin (LIPITOR) 20 MG tablet, Take 1 tablet (20 mg) by mouth daily  Mahsa Protect (EUCERIN) external cream, Apply topically 2 times daily as needed for dry skin or other Apply as needed to intact stage 1 pressure area and/or irritated skin, rash or excoriation  bisacodyl (DULCOLAX) 10 MG suppository, Place 10 mg rectally daily as needed for constipation If no BM or small BM for 3 days/9 shifts  blood glucose (NO BRAND SPECIFIED) test strip, Use to test blood sugar 4 times daily or as directed.  blood glucose calibration (NO BRAND SPECIFIED) solution, 1 drop every 14 days Use 1 drop to calibrate blood glucose monitor every 14 days  blood glucose monitoring (NO BRAND SPECIFIED) meter device kit, Use to test blood sugar 4 times daily or as directed.  cholecalciferol 1000 UNITS TABS, Take 1,000 Units by mouth daily  citalopram (CELEXA) 20 MG tablet, Take 20 mg by mouth every morning   digoxin (LANOXIN) 125 MCG tablet, Take 1 tablet (125 mcg) by mouth daily  furosemide (LASIX) 20 MG tablet, Take 2 tablets (40 mg) by mouth daily Take extra 40mg dose of lasix daily for 2 days  insulin glargine (LANTUS PEN) 100 UNIT/ML pen, Inject 30 Units Subcutaneous At Bedtime  insulin lispro (HUMALOG KWIKPEN) 100 UNIT/ML (1 unit dial) KWIKPEN, Inject 12 Units Subcutaneous 2 times daily (before meals) Breakfast and lunch  insulin lispro (HUMALOG KWIKPEN) 100 UNIT/ML (1 unit dial) KWIKPEN, Inject 14 Units Subcutaneous daily (with dinner)  loperamide (IMODIUM A-D) 2 MG tablet, Take 2-4 mg by mouth 4 times daily as needed for diarrhea Take 4 mg after 1st loose stool, then take 1 tablet as needed after each loose stool  magnesium hydroxide (MILK OF MAGNESIA) 400 MG/5ML suspension, Take 30 mLs by mouth daily as needed for constipation or heartburn If no BM/small BM for 3 days/9  "shifts  metFORMIN (GLUCOPHAGE-XR) 750 MG 24 hr tablet, Take 750 mg by mouth every morning  nitroglycerin (NITROSTAT) 0.4 MG sublingual tablet, For chest pain place 1 tablet under the tongue every 5 minutes for 3 doses. If symptoms persist 5 minutes after 1st dose call 911.  nystatin (MYCOSTATIN) 889812 UNIT/GM external powder, Apply topically 2 times daily as needed (to affected area(s) as needed)  order for DME, Equipment to be ordered:  Scale- Qty 1  Commander Flex - Qty. 1  Pulse-Oximeter - Qty. 1  Use as directed  order for DME, Equipment being ordered: DH2 shoe  pantoprazole (PROTONIX) 40 MG EC tablet, Take 40 mg by mouth daily  polyethylene glycol (MIRALAX) 17 GM/Dose powder, Take 17 g by mouth daily as needed  sacubitril-valsartan (ENTRESTO)  MG per tablet, Take 1 tablet by mouth 2 times daily  spironolactone (ALDACTONE) 25 MG tablet, Take 1 tablet (25 mg) by mouth daily  thin (NO BRAND SPECIFIED) lancets, 1 each 4 times daily Use with lanceting device.  traZODone (DESYREL) 50 MG tablet, Take 50 mg by mouth At Bedtime  ULTICARE SHORT 31G X 8 MM insulin pen needle,   venetoclax (VENCLEXTA) 100 MG tablet, Take 200 mg by mouth daily  guaiFENesin (ROBITUSSIN) 100 MG/5ML SYRP, Take 10 mLs by mouth every 4 hours as needed for cough For cold symptoms and non productive cough (Patient not taking: Reported on 3/9/2022)  neomycin-bacitracin-polymyxin (NEOSPORIN) 5-400-5000 ointment, Apply 1 each topically 3 times daily as needed (for minor abrasions)     No current facility-administered medications on file prior to visit.      ROS:   See HPI    EXAM:   /68 (BP Location: Right arm, Patient Position: Chair, Cuff Size: Adult Large)   Pulse 70   Ht 1.792 m (5' 10.55\")   Wt 87.5 kg (192 lb 12.8 oz)   SpO2 99%   BMI 27.23 kg/m       GENERAL: Appears comfortable, in no acute distress.   HEENT: Eye symmetrical, no discharge or icterus bilaterally. Mucous membranes moist and without lesions.  CV: RRR, +S1S2, no " murmur, rub, or gallop. JVP just above clavicle at 90 degrees.   RESPIRATORY: Respirations regular, even, and unlabored. Lungs CTA throughout.   GI: Soft and non distended with normoactive bowel sounds present in all quadrants. No tenderness, rebound, guarding. No hepatomegaly.   EXTREMITIES: AKA of the right leg. Left leg without edema. Left leg is warm and well perfused.  NEUROLOGIC: Alert and interacting appropriately. No focal deficits.   MUSCULOSKELETAL: No joint swelling or tenderness.   SKIN: No jaundice. No rashes or lesions.     Labs, reviewed with patient in clinic today:  CBC RESULTS:  Lab Results   Component Value Date    WBC 3.0 (L) 09/25/2021    WBC 3.7 (L) 06/26/2021    RBC 3.40 (L) 09/25/2021    RBC 3.09 (L) 06/26/2021    HGB 10.4 (L) 09/25/2021    HGB 9.6 (L) 06/26/2021    HCT 32.2 (L) 09/25/2021    HCT 28.6 (L) 06/26/2021    MCV 95 09/25/2021    MCV 93 06/26/2021    MCH 30.6 09/25/2021    MCH 31.1 06/26/2021    MCHC 32.3 09/25/2021    MCHC 33.6 06/26/2021    RDW 16.9 (H) 09/25/2021    RDW 14.6 06/26/2021    PLT 99 (L) 09/25/2021     (L) 06/26/2021       CMP RESULTS:  Lab Results   Component Value Date     03/09/2022     06/28/2021    POTASSIUM 4.2 03/09/2022    POTASSIUM 4.1 06/28/2021    CHLORIDE 109 03/09/2022    CHLORIDE 101 12/30/2021    CHLORIDE 109 06/28/2021    CO2 23 03/09/2022    CO2 24 06/28/2021    ANIONGAP 10 03/09/2022    ANIONGAP 4 06/28/2021     (H) 03/09/2022     (H) 06/28/2021    BUN 36 (H) 03/09/2022    BUN 43 (H) 06/28/2021    CR 1.61 (H) 03/09/2022    CR 1.33 (H) 06/28/2021    GFRESTIMATED 43 (L) 03/09/2022    GFRESTIMATED >60 07/08/2021    GFRESTIMATED 51 (L) 06/28/2021    GFRESTBLACK >60 07/08/2021    GFRESTBLACK 59 (L) 06/28/2021    JUSTINO 9.2 03/09/2022    JUSTINO 9.4 06/28/2021    BILITOTAL 0.8 09/23/2021    BILITOTAL 0.9 06/20/2021    ALBUMIN 3.0 (L) 09/23/2021    ALBUMIN 3.3 (L) 06/20/2021    ALKPHOS 84 09/23/2021    ALKPHOS 72 06/20/2021    ALT  12 09/23/2021    ALT 24 06/20/2021    AST 12 09/23/2021    AST 34 06/20/2021        INR RESULTS:  Lab Results   Component Value Date    INR 1.05 04/16/2021       Lab Results   Component Value Date    MAG 1.8 09/25/2021    MAG 2.0 06/23/2021     Lab Results   Component Value Date    NTBNPI 7,364 (H) 06/20/2021     Lab Results   Component Value Date    NTBNP 22,172 (H) 02/09/2018       Diagnostics:   TTE 6/20/21  1. Left ventricular systolic function is severely reduced. The visual ejection  fraction is estimated at 20-25%.  2. The right ventricle is normal in structure, function and size.  3. The left atrium is moderately dilated.  4. The aortic valve is trileaflet with aortic valve sclerosis.  5. There is mild (1+) mitral regurgitation.  ______________________________________________________________________________      TTE 10/5/18  The visual ejection fraction is estimated at 35-40%.  There is mild-moderate global hypokinesia of the left ventricle.  Mildly decreased right ventricular systolic function  In comparison with the previous study the LVEF has improved slightly.    Assessment and Plan:   Mr. Manning is a 81 year old male with a past medical history including CLL in remission, CAD s/p CABG in 1995, HFrEF 2/2 ICM, atrial flutter, PVD s/p iliac artery stents and left lower extremity bypass, hypertension, diabetes, hyperlipidemia, prosthesis on leg for about 2-3 years now. discitis and multple admissions for decompensated heart failure. Presents to clinic for CORE f/u.    Patient is recently moved into assisted living which is led to a lot more consistency in taking his medications.  No hospitalizations since December. Tis is the best that I have seen him look since I met him.    Unfortunately, his EF has worsened on his most recent echo. It is now 20 to 25%.  We are titrating his neurohormonal blockade. Then we will recheck an echo. If he remains below 35% we will need to discuss ICD.    # Chronic systolic  heart failure/HFrEF secondary to ICM  (Ef 20-25%)  Stage C. NYHA Class IIIA  Fluid status: euvolemic, continue current diuretics  ACEi/ARB/ARNI: Entresto 97/103 BID -max dose acheived  BB: increase metoprolol XL to 125 mg daily, ongoing titration  Aldosterone antagonist: Aldactone 25 mg  SGLT2i- I have reached out to Dr. Stahl ([facility]) to discuss the addition of farxiga in the setting of his other DM regimen  SCD prophylaxis: n/a EF now below 35%- will need to consider if not improving with CHF med titration  NSAID use: contraindicated  Sleep apnea evaluation: not discussed today  Remote monitoring: consider at future visits, although now lives at an assisted living where he has more supervision    # CAD s/p CABG  # PAD s/p iliac stenting and LE bypass  - No anginal symptoms, he is on eliquis and statin (defer high potency to cardiologist); he has stable claudication    # Atrial flutter  - He is on BB and elquis    # CKD stage 3a  - Cr stable at his baseline    # T2DM  - Managed by Dr. Velarde  - Will discuss farxiga addition    Follow-up  - RN phone call in 2 weeks (inrease more metoprolol if possible)  - CORE clinic in 1 month  - Dr. Leon in May as scheduled    Billing  - I managed 2+ stable chronic conditions  - I changed a prescription medication      Carly Lomas PA-C  [facility] Cardiology  [facility]  FABY LEON

## 2022-03-09 ENCOUNTER — LAB (OUTPATIENT)
Dept: LAB | Facility: CLINIC | Age: 82
End: 2022-03-09
Payer: COMMERCIAL

## 2022-03-09 ENCOUNTER — OFFICE VISIT (OUTPATIENT)
Dept: CARDIOLOGY | Facility: CLINIC | Age: 82
End: 2022-03-09
Attending: PHYSICIAN ASSISTANT
Payer: COMMERCIAL

## 2022-03-09 VITALS
SYSTOLIC BLOOD PRESSURE: 117 MMHG | HEIGHT: 71 IN | WEIGHT: 192.8 LBS | HEART RATE: 70 BPM | BODY MASS INDEX: 26.99 KG/M2 | DIASTOLIC BLOOD PRESSURE: 68 MMHG | OXYGEN SATURATION: 99 %

## 2022-03-09 DIAGNOSIS — I50.22 CHRONIC SYSTOLIC HEART FAILURE (H): ICD-10-CM

## 2022-03-09 DIAGNOSIS — I50.9 ACUTE ON CHRONIC CONGESTIVE HEART FAILURE, UNSPECIFIED HEART FAILURE TYPE (H): ICD-10-CM

## 2022-03-09 LAB
ANION GAP SERPL CALCULATED.3IONS-SCNC: 10 MMOL/L (ref 3–14)
BUN SERPL-MCNC: 36 MG/DL (ref 7–30)
CALCIUM SERPL-MCNC: 9.2 MG/DL (ref 8.5–10.1)
CHLORIDE BLD-SCNC: 109 MMOL/L (ref 94–109)
CO2 SERPL-SCNC: 23 MMOL/L (ref 20–32)
CREAT SERPL-MCNC: 1.61 MG/DL (ref 0.66–1.25)
GFR SERPL CREATININE-BSD FRML MDRD: 43 ML/MIN/1.73M2
GLUCOSE BLD-MCNC: 244 MG/DL (ref 70–99)
POTASSIUM BLD-SCNC: 4.2 MMOL/L (ref 3.4–5.3)
SODIUM SERPL-SCNC: 142 MMOL/L (ref 133–144)

## 2022-03-09 PROCEDURE — 99214 OFFICE O/P EST MOD 30 MIN: CPT | Performed by: PHYSICIAN ASSISTANT

## 2022-03-09 PROCEDURE — G0463 HOSPITAL OUTPT CLINIC VISIT: HCPCS

## 2022-03-09 PROCEDURE — 36415 COLL VENOUS BLD VENIPUNCTURE: CPT | Performed by: PATHOLOGY

## 2022-03-09 PROCEDURE — 80048 BASIC METABOLIC PNL TOTAL CA: CPT | Performed by: PATHOLOGY

## 2022-03-09 RX ORDER — FUROSEMIDE 40 MG
40 TABLET ORAL DAILY
Qty: 90 TABLET | Refills: 1 | Status: SHIPPED | OUTPATIENT
Start: 2022-03-09 | End: 2022-03-24

## 2022-03-09 RX ORDER — METOPROLOL SUCCINATE 25 MG/1
25 TABLET, EXTENDED RELEASE ORAL DAILY
Qty: 90 TABLET | Refills: 1 | Status: SHIPPED | OUTPATIENT
Start: 2022-03-09 | End: 2022-04-14

## 2022-03-09 RX ORDER — METOPROLOL SUCCINATE 100 MG/1
100 TABLET, EXTENDED RELEASE ORAL DAILY
Qty: 90 TABLET | Refills: 3 | Status: SHIPPED | OUTPATIENT
Start: 2022-03-09 | End: 2022-04-14

## 2022-03-09 ASSESSMENT — PAIN SCALES - GENERAL: PAINLEVEL: NO PAIN (0)

## 2022-03-09 NOTE — LETTER
3/9/2022      RE: Tad Manning  [address] [place] [id number]       Dear Colleague,    Thank you for the opportunity to participate in the care of your patient, Tad Manning, at the [facility] [place] at [facility]. Please see a copy of my visit note below.    In person appointment    HPI:   Mr. Manning is a 81 year old male with a past medical history including CLL (ongoing treatment), CAD s/p CABG in 1995, HFrEF 2/2 ICM, atrial flutter, PVD s/p iliac artery stents and left lower extremity bypass, hypertension, diabetes, hyperlipidemia, prosthesis on leg for about 2-3 years now. discitis and multple admission for decompensated heart failure. Presents to clinic for CORE f/u.    He last saw CORE 12/3/21. His aldactone was increased. He got an extra dose of lasix for 2 days.     He is feeling good. They think he's had more energy. No SOB at rest. He thinks he can walk 1/2 mile to 1 mile before he feel ALEXIS. LE edema is not too bad. Prosthesis on r leg. He thinks he has some mild abdominal edema. He denies orthopnea, PND. He has some lightheadedness when he first wakes up and gets up. No dizziness. No palpitations or syncope. No chest pain. Appetite is good.    The doctor at [facility] comes about once per month.     Weights have been 182 on 3/5, 191 3/2, 192.6 on 2/23, 194 on 2/16, 2203 on 2/9, 2195 on 2/5 and 192.6 on 2/2.    Cardiac Medications  Eliquis 5 mg BID  Entresto  mg BID  Metoprolol succinate 100 mg daily  Lasix 40 mg daily  Aldactone 25 mg daily  Atorvastatin 20 mg daily    PAST MEDICAL HISTORY:  Past Medical History:   Diagnosis Date     ACS (acute coronary syndrome) (H) 6/20/2021     Acute respiratory failure with hypoxia (H) 4/6/2017     STORM (acute kidney injury) (H) 9/22/2018     Arthritis      ASCVD (arteriosclerotic cardiovascular disease)      Atrial fibrillation (H) 2/5/2018     Atrial flutter  (H) 2017     BMI 30.0-30.9,adult      BPH (benign prostatic hypertrophy)      CAD S/P percutaneous coronary angioplasty 2014     Cellulitis      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)      Chronic systolic heart failure (H) 2017     Confusion 2021     Coronary artery disease     triple bypass      CRF (chronic renal failure), stage 3 (moderate) (H) 2017     Critical lower limb ischemia (H) 1/10/2014     Diabetes mellitus (H)      Diabetes mellitus, type 2 (H) 2014     Diabetic polyneuropathy (H)      Discitis 2017     Epidural abscess 2017     Fall 2017     History of blood transfusion      HTN (hypertension) 2014     Hyperglycemia 2020     Hyperlipidaemia      Hyperlipidemia with target LDL less than 100 2014    Formatting of this note might be different from the original. Overview:  Diagnosis updated by automated process. Provider to review and confirm. Formatting of this note might be different from the original. Diagnosis updated by automated process. Provider to review and confirm.     Hypertension      Long term current use of anticoagulant therapy 2018     Non-healing ulcer of foot (H)     Right     Noninflammatory pericardial effusion      Osteomyelitis (H) 2014     Osteomyelitis of foot, right, acute (H) 2/15/2017     Osteomyelitis of left foot (H)      PVD (peripheral vascular disease) (H)      Recurrent falls 2021     Sebaceous cyst        FAMILY HISTORY:  Family History   Problem Relation Age of Onset     Cancer Mother         leukemia     Leukemia Mother        SOCIAL HISTORY:  Socioeconomic History     Marital status: Single   Tobacco Use     Smoking status: Former Smoker     Packs/day: 2.00     Years: 30.00     Pack years: 60.00     Types: Cigarettes     Last attempt to quit: 1995     Years since quittin.5     Smokeless tobacco: Never Used   Other Topics Concern     Parent/sibling  w/ CABG, MI or angioplasty before 65F 55M? Not Asked   Social History Narrative    Lives independently with SO. 2/9/2017        CURRENT MEDICATIONS:  acetaminophen (TYLENOL) 325 MG tablet, Take 650 mg by mouth every 4 hours as needed for mild pain or fever  alcohol swab prep pads, Use to swab area of injection/jina as directed.  apixaban ANTICOAGULANT (ELIQUIS ANTICOAGULANT) 5 MG tablet, Take 1 tablet (5 mg) by mouth 2 times daily  aspirin (ASA) 81 MG chewable tablet, Take 81 mg by mouth daily   atorvastatin (LIPITOR) 20 MG tablet, Take 1 tablet (20 mg) by mouth daily  Mahsa Protect (EUCERIN) external cream, Apply topically 2 times daily as needed for dry skin or other Apply as needed to intact stage 1 pressure area and/or irritated skin, rash or excoriation  bisacodyl (DULCOLAX) 10 MG suppository, Place 10 mg rectally daily as needed for constipation If no BM or small BM for 3 days/9 shifts  blood glucose (NO BRAND SPECIFIED) test strip, Use to test blood sugar 4 times daily or as directed.  blood glucose calibration (NO BRAND SPECIFIED) solution, 1 drop every 14 days Use 1 drop to calibrate blood glucose monitor every 14 days  blood glucose monitoring (NO BRAND SPECIFIED) meter device kit, Use to test blood sugar 4 times daily or as directed.  cholecalciferol 1000 UNITS TABS, Take 1,000 Units by mouth daily  citalopram (CELEXA) 20 MG tablet, Take 20 mg by mouth every morning   digoxin (LANOXIN) 125 MCG tablet, Take 1 tablet (125 mcg) by mouth daily  furosemide (LASIX) 20 MG tablet, Take 2 tablets (40 mg) by mouth daily Take extra 40mg dose of lasix daily for 2 days  insulin glargine (LANTUS PEN) 100 UNIT/ML pen, Inject 30 Units Subcutaneous At Bedtime  insulin lispro (HUMALOG KWIKPEN) 100 UNIT/ML (1 unit dial) KWIKPEN, Inject 12 Units Subcutaneous 2 times daily (before meals) Breakfast and lunch  insulin lispro (HUMALOG KWIKPEN) 100 UNIT/ML (1 unit dial) KWIKPEN, Inject 14 Units Subcutaneous daily (with  dinner)  loperamide (IMODIUM A-D) 2 MG tablet, Take 2-4 mg by mouth 4 times daily as needed for diarrhea Take 4 mg after 1st loose stool, then take 1 tablet as needed after each loose stool  magnesium hydroxide (MILK OF MAGNESIA) 400 MG/5ML suspension, Take 30 mLs by mouth daily as needed for constipation or heartburn If no BM/small BM for 3 days/9 shifts  metFORMIN (GLUCOPHAGE-XR) 750 MG 24 hr tablet, Take 750 mg by mouth every morning  nitroglycerin (NITROSTAT) 0.4 MG sublingual tablet, For chest pain place 1 tablet under the tongue every 5 minutes for 3 doses. If symptoms persist 5 minutes after 1st dose call 911.  nystatin (MYCOSTATIN) 370312 UNIT/GM external powder, Apply topically 2 times daily as needed (to affected area(s) as needed)  order for DME, Equipment to be ordered:  Scale- Qty 1  Commander Flex - Qty. 1  Pulse-Oximeter - Qty. 1  Use as directed  order for DME, Equipment being ordered: [facility] shoe  pantoprazole (PROTONIX) 40 MG EC tablet, Take 40 mg by mouth daily  polyethylene glycol (MIRALAX) 17 GM/Dose powder, Take 17 g by mouth daily as needed  sacubitril-valsartan (ENTRESTO)  MG per tablet, Take 1 tablet by mouth 2 times daily  spironolactone (ALDACTONE) 25 MG tablet, Take 1 tablet (25 mg) by mouth daily  thin (NO BRAND SPECIFIED) lancets, 1 each 4 times daily Use with lanceting device.  traZODone (DESYREL) 50 MG tablet, Take 50 mg by mouth At Bedtime  ULTICARE SHORT 31G X 8 MM insulin pen needle,   venetoclax (VENCLEXTA) 100 MG tablet, Take 200 mg by mouth daily  guaiFENesin (ROBITUSSIN) 100 MG/5ML SYRP, Take 10 mLs by mouth every 4 hours as needed for cough For cold symptoms and non productive cough (Patient not taking: Reported on 3/9/2022)  neomycin-bacitracin-polymyxin (NEOSPORIN) 5-400-5000 ointment, Apply 1 each topically 3 times daily as needed (for minor abrasions)     No current facility-administered medications on file prior to visit.      ROS:   See HPI    EXAM:   /68 (BP  "Location: Right arm, Patient Position: Chair, Cuff Size: Adult Large)   Pulse 70   Ht 1.792 m (5' 10.55\")   Wt 87.5 kg (192 lb 12.8 oz)   SpO2 99%   BMI 27.23 kg/m       GENERAL: Appears comfortable, in no acute distress.   HEENT: Eye symmetrical, no discharge or icterus bilaterally. Mucous membranes moist and without lesions.  CV: RRR, +S1S2, no murmur, rub, or gallop. JVP just above clavicle at 90 degrees.   RESPIRATORY: Respirations regular, even, and unlabored. Lungs CTA throughout.   GI: Soft and non distended with normoactive bowel sounds present in all quadrants. No tenderness, rebound, guarding. No hepatomegaly.   EXTREMITIES: AKA of the right leg. Left leg without edema. Left leg is warm and well perfused.  NEUROLOGIC: Alert and interacting appropriately. No focal deficits.   MUSCULOSKELETAL: No joint swelling or tenderness.   SKIN: No jaundice. No rashes or lesions.     Labs, reviewed with patient in clinic today:  CBC RESULTS:  Lab Results   Component Value Date    WBC 3.0 (L) 09/25/2021    WBC 3.7 (L) 06/26/2021    RBC 3.40 (L) 09/25/2021    RBC 3.09 (L) 06/26/2021    HGB 10.4 (L) 09/25/2021    HGB 9.6 (L) 06/26/2021    HCT 32.2 (L) 09/25/2021    HCT 28.6 (L) 06/26/2021    MCV 95 09/25/2021    MCV 93 06/26/2021    MCH 30.6 09/25/2021    MCH 31.1 06/26/2021    MCHC 32.3 09/25/2021    MCHC 33.6 06/26/2021    RDW 16.9 (H) 09/25/2021    RDW 14.6 06/26/2021    PLT 99 (L) 09/25/2021     (L) 06/26/2021       CMP RESULTS:  Lab Results   Component Value Date     03/09/2022     06/28/2021    POTASSIUM 4.2 03/09/2022    POTASSIUM 4.1 06/28/2021    CHLORIDE 109 03/09/2022    CHLORIDE 101 12/30/2021    CHLORIDE 109 06/28/2021    CO2 23 03/09/2022    CO2 24 06/28/2021    ANIONGAP 10 03/09/2022    ANIONGAP 4 06/28/2021     (H) 03/09/2022     (H) 06/28/2021    BUN 36 (H) 03/09/2022    BUN 43 (H) 06/28/2021    CR 1.61 (H) 03/09/2022    CR 1.33 (H) 06/28/2021    GFRESTIMATED 43 (L) " 03/09/2022    GFRESTIMATED >60 07/08/2021    GFRESTIMATED 51 (L) 06/28/2021    GFRESTBLACK >60 07/08/2021    GFRESTBLACK 59 (L) 06/28/2021    JUSTINO 9.2 03/09/2022    JUSTINO 9.4 06/28/2021    BILITOTAL 0.8 09/23/2021    BILITOTAL 0.9 06/20/2021    ALBUMIN 3.0 (L) 09/23/2021    ALBUMIN 3.3 (L) 06/20/2021    ALKPHOS 84 09/23/2021    ALKPHOS 72 06/20/2021    ALT 12 09/23/2021    ALT 24 06/20/2021    AST 12 09/23/2021    AST 34 06/20/2021        INR RESULTS:  Lab Results   Component Value Date    INR 1.05 04/16/2021       Lab Results   Component Value Date    MAG 1.8 09/25/2021    MAG 2.0 06/23/2021     Lab Results   Component Value Date    NTBNPI 7,364 (H) 06/20/2021     Lab Results   Component Value Date    NTBNP 22,172 (H) 02/09/2018       Diagnostics:   TTE 6/20/21  1. Left ventricular systolic function is severely reduced. The visual ejection  fraction is estimated at 20-25%.  2. The right ventricle is normal in structure, function and size.  3. The left atrium is moderately dilated.  4. The aortic valve is trileaflet with aortic valve sclerosis.  5. There is mild (1+) mitral regurgitation.  ______________________________________________________________________________      TTE 10/5/18  The visual ejection fraction is estimated at 35-40%.  There is mild-moderate global hypokinesia of the left ventricle.  Mildly decreased right ventricular systolic function  In comparison with the previous study the LVEF has improved slightly.    Assessment and Plan:   Mr. Manning is a 81 year old male with a past medical history including CLL in remission, CAD s/p CABG in 1995, HFrEF 2/2 ICM, atrial flutter, PVD s/p iliac artery stents and left lower extremity bypass, hypertension, diabetes, hyperlipidemia, prosthesis on leg for about 2-3 years now. discitis and multple admissions for decompensated heart failure. Presents to clinic for CORE f/u.    Patient is recently moved into assisted living which is led to a lot more consistency in  taking his medications.  No hospitalizations since December. Tis is the best that I have seen him look since I met him.    Unfortunately, his EF has worsened on his most recent echo. It is now 20 to 25%.  We are titrating his neurohormonal blockade. Then we will recheck an echo. If he remains below 35% we will need to discuss ICD.    # Chronic systolic heart failure/HFrEF secondary to ICM  (Ef 20-25%)  Stage C. NYHA Class IIIA  Fluid status: euvolemic, continue current diuretics  ACEi/ARB/ARNI: Entresto 97/103 BID -max dose acheived  BB: increase metoprolol XL to 125 mg daily, ongoing titration  Aldosterone antagonist: Aldactone 25 mg  SGLT2i- I have reached out to Dr. Stahl ([facility]) to discuss the addition of farxiga in the setting of his other DM regimen  SCD prophylaxis: n/a EF now below 35%- will need to consider if not improving with CHF med titration  NSAID use: contraindicated  Sleep apnea evaluation: not discussed today  Remote monitoring: consider at future visits, although now lives at an assisted living where he has more supervision    # CAD s/p CABG  # PAD s/p iliac stenting and LE bypass  - No anginal symptoms, he is on eliquis and statin (defer high potency to cardiologist); he has stable claudication    # Atrial flutter  - He is on BB and elquis    # CKD stage 3a  - Cr stable at his baseline    # T2DM  - Managed by Dr. Velarde  - Will discuss farxiga addition    Follow-up  - RN phone call in 2 weeks (inrease more metoprolol if possible)  - CORE clinic in 1 month  - Dr. Leon in May as scheduled    Billing  - I managed 2+ stable chronic conditions  - I changed a prescription medication      Carly Lomas PA-C  [facility]  [facility]  [facility] [place]

## 2022-03-09 NOTE — PATIENT INSTRUCTIONS
Take your medicines every day, as directed    Changes made today:  o Increase your metoprolol XL to 125 mg daily  o If your weight is above 194lbs, take an EXTRA 20 mg of lasix that day  o I will talk to Dr. Stahl about [place] (the diabetes and Heart Medication)   Monitor Your Weight and Symptoms    Contact us if you:      Gain 2 pounds in one day or 5 pounds in one week    Feel more short of breath    Notice more leg swelling    Feel lightheadeded   Change your lifestyle    Limit Salt or Sodium:    2000 mg  Limit Fluids:    2000 mL or approximately 64 ounces  Eat a Heart Healthy Diet    Low in saturated fats  Stay Active:    Aim to move at least 150 minutes every  week         To Contact us    During Business Hours:  [phone], option # 1      After hours, weekends or holidays:   [phone], Option #4  Ask to speak to the On-Call Cardiologist. Inform them you are a CORE/heart failure patient at the [facility].     Use [employer] allows you to communicate directly with your heart team through secure messaging.    [employer] can be accessed any time on your phone, computer, or tablet.    If you need assistance, we'd be happy to help!         Keep your Heart Appointments:    - RN phone call in 2 weeks  - CORE clinic in 1 month  - Dr. Valdes in May as scheduled

## 2022-03-09 NOTE — NURSING NOTE
Diet: Patient instructed regarding a heart healthy diet, including discussion of reduced fat and sodium intake. Patient demonstrated understanding of this information and agreed to call with further questions or concerns.  Labs: Patient was given results of the laboratory testing obtained today. Patient was instructed to return for the next laboratory testing in 1 month with CORE Clinic . Patient demonstrated understanding of this information and agreed to call with further questions or concerns.   Return Appointment: Patient given instructions regarding scheduling next clinic visit. Patient demonstrated understanding of this information and agreed to call with further questions or concerns.  Medication Change: Patient was educated regarding prescribed medication change, including discussion of the indication, administration, side effects, and when to report to MD or RN. Patient demonstrated understanding of this information and agreed to call with further questions or concerns.  Increase Metoprolol to 125 mg daily.   Continue Lasix 40 mg daily. Take an additional 20 mg if weight over 194 lbs.     Called Nursing Office at [facility] with update and order faxed.   They will send a message to Dr. Stahl with Deb's cell number to call directly to discuss Farxiga.     Patient stated he understood all health information given and agreed to call with further questions or concerns.   Verónica Jarquin RN

## 2022-03-11 ENCOUNTER — PATIENT OUTREACH (OUTPATIENT)
Dept: CARDIOLOGY | Facility: CLINIC | Age: 82
End: 2022-03-11
Payer: COMMERCIAL

## 2022-03-11 DIAGNOSIS — I50.22 CHRONIC SYSTOLIC HEART FAILURE (H): ICD-10-CM

## 2022-03-11 DIAGNOSIS — I50.9 ACUTE ON CHRONIC CONGESTIVE HEART FAILURE, UNSPECIFIED HEART FAILURE TYPE (H): Primary | ICD-10-CM

## 2022-03-11 RX ORDER — DAPAGLIFLOZIN 10 MG/1
10 TABLET, FILM COATED ORAL DAILY
Qty: 90 TABLET | Refills: 1 | Status: SHIPPED | OUTPATIENT
Start: 2022-03-11 | End: 2022-03-11

## 2022-03-11 NOTE — TELEPHONE ENCOUNTER
After clinic visit on 3/9/22 CORE provider reviewed with primary provider at [facility]isted [facility] and agreed to start Farxiga.     Per provider, send 10 mg daily to see if gets approved, but should start out at 5 mg daily. BMP 7-10 days after starting.     Date: 3/11/2022    Time of Call: 10:56 AM     Diagnosis:  HF     [ VORB ] Ordering provider: Deb JENNINGS  Order: Farxiga 10 mg daily. Start at 5 mg daily.      Order received by: Verónica Jarquin RN      Follow-up/additional notes: BMP

## 2022-03-11 NOTE — TELEPHONE ENCOUNTER
Reviewed with provider and sent Jardiance 10 mg. Called pharmacy. This is covered by insurance and is $29 for a 30 day supply.   Called Joyce to review. She is ok with this cost. Will have pharmacy fill order. Per Deb ok to start at 10 mg daily.     Called [facility]isted living nursing office to update them.     Date: 3/11/2022    Time of Call: 4:03 PM     Diagnosis:  HF     [ VORB ] Ordering provider: Deb JENNINGS  Order: Jardiance 10 mg daily. BMP in 7-10 days.      Order received by: Verónica Jarquin RN      Follow-up/additional notes: orders faxed to nursing office and left message for nurses at [facility].

## 2022-03-11 NOTE — TELEPHONE ENCOUNTER
Called pharmacy to review cost of Farxiga. They ran it through insurance and it came back as non formulary and not covered at all by insurance. Out of pocket cost would be $674.   Will review with provider.

## 2022-03-17 ENCOUNTER — VIRTUAL VISIT (OUTPATIENT)
Dept: PHARMACY | Facility: CLINIC | Age: 82
End: 2022-03-17
Payer: COMMERCIAL

## 2022-03-17 DIAGNOSIS — F41.9 ANXIETY: ICD-10-CM

## 2022-03-17 DIAGNOSIS — N18.32 TYPE 2 DIABETES MELLITUS WITH STAGE 3B CHRONIC KIDNEY DISEASE, UNSPECIFIED WHETHER LONG TERM INSULIN USE (H): ICD-10-CM

## 2022-03-17 DIAGNOSIS — C91.10 CLL (CHRONIC LYMPHOCYTIC LEUKEMIA) (H): ICD-10-CM

## 2022-03-17 DIAGNOSIS — I50.22 CHRONIC SYSTOLIC HEART FAILURE (H): Primary | ICD-10-CM

## 2022-03-17 DIAGNOSIS — K21.00 GASTROESOPHAGEAL REFLUX DISEASE WITH ESOPHAGITIS, UNSPECIFIED WHETHER HEMORRHAGE: ICD-10-CM

## 2022-03-17 DIAGNOSIS — I48.92 ATRIAL FLUTTER, UNSPECIFIED TYPE (H): ICD-10-CM

## 2022-03-17 DIAGNOSIS — E11.22 TYPE 2 DIABETES MELLITUS WITH STAGE 3B CHRONIC KIDNEY DISEASE, UNSPECIFIED WHETHER LONG TERM INSULIN USE (H): ICD-10-CM

## 2022-03-17 PROCEDURE — 99607 MTMS BY PHARM ADDL 15 MIN: CPT | Performed by: PHARMACIST

## 2022-03-17 PROCEDURE — 99605 MTMS BY PHARM NP 15 MIN: CPT | Performed by: PHARMACIST

## 2022-03-17 NOTE — LETTER
_  Medication List        Prepared on: 3/24/2022     Bring your Medication List when you go to the doctor, hospital, or   emergency room. And, share it with your family or caregivers.     Note any changes to how you take your medications.  Cross out medications when you no longer use them.    Medication How I take it Why I use it Prescriber   acetaminophen (TYLENOL) 325 MG tablet Take 650 mg by mouth every 4 hours as needed Max 3gm per day for minor pain/fever  minor pain  Dr. Stahl   alcohol swab prep pads Use to swab area of injection/jina as directed. Type 2 diabetes mellitus with stage 3  dry skin michi kidney disease, unspecified whether long term insulin use (H) Fritz Cain MD   apixaban ANTICOAGULANT (ELIQUIS ANTICOAGULANT) 5 MG tablet Take 1 tablet (5 mg) by mouth 2 times daily Paroxysmal a-fib (H) Clarisse Valdes MD   aspirin (ASA) 81 MG chewable tablet Take 81 mg by mouth daily Diabetes  Dr. Stahl    atorvastatin (LIPITOR) 20 MG tablet Take 1 tablet (20 mg) by mouth daily Claudication of left lower extremity (H) Clarisse Valdes MD   Mahsa Protect (EUCERIN) external cream Apply topically 2 times daily as needed for dry skin or other Apply as needed to intact stage 1 pressure area and/or irritated skin, rash or excoriation  dry skin Dr. Stahl   bisacodyl (DULCOLAX) 10 MG suppository Place 10 mg rectally daily as needed for constipation If no BM or small BM for 3 days/9 shifts  constipation Dr. Stahl   blood glucose (NO BRAND SPECIFIED) test strip Use to test blood sugar 4 times daily or as directed. Type 2 diabetes mellitus with stage 3 chronic kidney disease, unspecified whether long term insulin use (H) Fritz Cain MD   blood glucose calibration (NO BRAND SPECIFIED) solution 1 drop every 14 days Use 1 drop to calibrate blood glucose monitor every 14 days Type 2 diabetes mellitus with stage 3 chronic kidney disease, unspecified whether long term insulin use (H) Sandra Frye  MD   blood glucose monitoring (NO BRAND SPECIFIED) meter device kit Use to test blood sugar 4 times daily or as directed. Type 2 diabetes mellitus with stage 3 chronic kidney disease, unspecified whether long term insulin use (H) Fritz Cain MD   cholecalciferol 1000 UNITS TABS Take 1,000 Units by mouth daily  vitamin D deficiency Dr. Stahl   citalopram (CELEXA) 20 MG tablet Take 20 mg by mouth every morning   anxiety Dr. Stahl   digoxin (LANOXIN) 125 MCG tablet Take 1 tablet (125 mcg) by mouth daily Chronic Systolic Heart Failure (H) Clarisse Valdes MD   empagliflozin (JARDIANCE) 10 MG TABS tablet Take 1 tablet (10 mg) by mouth daily Chronic Systolic Heart Failure (H) Carly Lomas PA-C   furosemide (LASIX) 20 MG tablet Take 1 tablet (20 mg) by mouth daily Take an additional 20 mg if weight over 194 lbs. Acute on chronic congestive heart failure, unspecified heart failure type (H); Chronic Systolic Heart Failure (H) Carly Lomas PA-C   insulin glargine (LANTUS PEN) 100 UNIT/ML pen Inject 50 Units Subcutaneous At Bedtime  type 2 diabetes Dr. Stahl   insulin lispro (HUMALOG KWIKPEN) 100 UNIT/ML (1 unit dial) KWIKPEN Inject 16 Units Subcutaneous 2 times daily (before meals) Breakfast and lunch   type 2 diabetes Dr. Stahl   insulin lispro (HUMALOG KWIKPEN) 100 UNIT/ML (1 unit dial) KWIKPEN Inject 18 Units Subcutaneous daily (with dinner)  type 2 diabetes Dr. Stahl   loperamide (IMODIUM A-D) 2 MG tablet Take 2-4 mg by mouth 4 times daily as needed for diarrhea Take 4 mg after 1st loose stool, then take 1 tablet as needed after each loose stool  diarrhea Dr. Stahl   magnesium hydroxide (MILK OF MAGNESIA) 400 MG/5ML suspension Take 30 mLs by mouth daily as needed for constipation or heartburn If no BM/small BM for 3 days/9 shifts  constipation Dr. Stahl   metFORMIN (GLUCOPHAGE-XR) 750 MG 24 hr tablet Take 750 mg by mouth every morning  type 2 diabetes Dr. Stahl   metoprolol succinate ER  (TOPROL-XL) 100 MG 24 hr tablet Take 1 tablet (100 mg) by mouth daily Take with 25 mg tab for total of 125 mg daily Chronic Systolic Heart Failure (H) Carly Lomas PA-C   metoprolol succinate ER (TOPROL-XL) 25 MG 24 hr tablet Take 1 tablet (25 mg) by mouth daily Take with 100 mg tab to equal 125 mg daily. Acute on chronic congestive heart failure, unspecified heart failure type (H) Carly Lomas PA-C   nitroglycerin (NITROSTAT) 0.4 MG sublingual tablet For chest pain place 1 tablet under the tongue every 5 minutes for 3 doses. If symptoms persist 5 minutes after 1st dose call 911. Atrial flutter, unspecified type (H) Derek Navas DDS   nystatin (MYCOSTATIN) 344679 UNIT/GM external powder Apply topically 2 times daily as needed (to affected area(s) as needed)  rash Dr. 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 1  Pulse-Oximeter - Qty. 1  Use as directed Chronic Systolic Heart Failure (H) OFELIA Cole CNP   pantoprazole (PROTONIX) 40 MG EC tablet Take 40 mg by mouth daily  GERD Dr. Stahl   polyethylene glycol (MIRALAX) 17 GM/Dose powder Take 17 g by mouth daily as needed Slow Transit Constipation Sandip Andre MD   sacubitril-valsartan (ENTRESTO)  MG per tablet Take 1 tablet by mouth 2 times daily Chronic Systolic Heart Failure (H) Clarisse Valdes MD   spironolactone (ALDACTONE) 25 MG tablet Take 1 tablet (25 mg) by mouth daily Acute on chronic congestive heart failure, unspecified heart failure type (H); Chronic Systolic Heart Failure (H) OFELIA Handley CNP   thin (NO BRAND SPECIFIED) lancets 1 each 4 times daily Use with lanceting device. Type 2 diabetes mellitus with stage 3 chronic kidney disease, unspecified whether long term insulin use  (H) Sandra Frye MD   traZODone (DESYREL) 50 MG tablet Take 50 mg by mouth At Bedtime  insomnia Dr. Stahl   triamcinolone (KENALOG) 0.1 % external cream Apply topically to right stump, left hand and right forearm twice daily for rash  rash Dr. Favio CASTILLO SHORT 31G X 8 MM insulin pen needle Inject 1 each Subcutaneous 4 times daily  type 2 diabetes Dr. Stahl   venetoclax (VENCLEXTA) 100 MG tablet Take 200 mg by mouth every morning CLL Dr. England         Add new medications, over-the-counter drugs, herbals, vitamins, or  minerals in the blank rows below.    Medication How I take it Why I use it Prescriber                          Allergies:      blood transfusion related (informational only); blood-group specific substance        Side effects I have had:              Other Information:             My notes and questions:

## 2022-03-17 NOTE — LETTER
March 24, 2022  Tad FREDERICK Nigel  [address] [facility] [address] [place] [id number]    Dear Efraín Manning, D[facility]        Thank you for talking with me on Mar 17, 2022 about your health and medications. As a follow-up to our conversation, I have included two documents:      1. Your Recommended To-Do List has steps you should take to get the best results from your medications.  2. Your Medication List will help you keep track of your medications and how to take them.    If you want to talk about these documents, please call Dneny Ruiz PharmD at phone: [phone], Monday-Friday 8-4:30pm.    I look forward to working with you and your doctors to make sure your medications work well for you.    Sincerely,    Denny Ruiz PharmD  [facility] Pharmacist, [employer]

## 2022-03-17 NOTE — LETTER
"Recommended To-Do List      Prepared on: 3/24/2022     You can get the best results from your medications by completing the items on this \"To-Do List.\"      Bring your To-Do List when you go to your doctor. And, share it with your family or caregivers.    My To-Do List:  What we talked about: What I should do:   What my medicines are for, how to know if my medicines are working, made sure my medicines are safe for me and reviewed how to take my medicines.   Take my medicines every day                "

## 2022-03-17 NOTE — PROGRESS NOTES
Medication Therapy Management (MTM) Encounter    ASSESSMENT:                            Medication Adherence/Access: No issues identified    1. Chronic systolic heart failure (H)  Managed by cardiology, in the core clinic.  Most recently seen on March 9, 2022, note reviewed.  Confirmed with MAR at assisted living facility he is taking medications as directed by cardiology.    2. Atrial flutter, unspecified type (H)  Stable per cardiology, continues on rate control with beta-blocker and chronic anticoagulation with Eliquis.    3. Type 2 diabetes mellitus with stage 3b chronic kidney disease, unspecified whether long term insulin use (H)  At goal A1c less than 8% per ADA guidelines.  Updated medication reconciliation with increased dose doses of insulin per assisted-living MAR.  We do not have more recent updated labs.  Managed by Dr. Stahl.  He continues on a renally adjusted Metformin, more recent addition of Jardiance 10 mg daily, pending BMP as ordered by cardiology, and basal bolus insulin.  Recommend to continue current regimen as directed. He had been following with vascular surgery however has not been seen since December 2020 per chart review.  He denies any difficulties with his stump post amputation at this time.    4. Gastroesophageal reflux disease with esophagitis, unspecified whether hemorrhage  Stable, recommend continue current regimen.    5. Anxiety  Stable, recommend to continue current regimen.    6. CLL (chronic lymphocytic leukemia) (H)  Managed by oncology, I was unable to find the notes regarding this condition, however I was able to confirm ongoing daily use of oral Venclexta.     PLAN:                            Continue current medication regimen     Follow-up: Return in about 1 year (around 3/17/2023) for Follow up, with me, using a phone visit.    SUBJECTIVE/OBJECTIVE:                          Tad Manning is a 81 year old male called for an initial visit. He was referred to me from  "[employer].  He is not exactly sure who his primary doctor is.  In addition to a primary physician there is a physician that comes to his assisted living facility to provide care.  He lives at [facility] in [place].     Reason for visit: Medication management initial.    Allergies/ADRs: Reviewed in chart  Past Medical History: Revie per chart review he has been following with wed in chart  Tobacco: He reports that he quit smoking about 27 years ago. His smoking use included cigarettes. He has a 60.00 pack-year smoking history. He has never used smokeless tobacco.  Alcohol: none    Medication Adherence/Access: meds are organized and administered for him by assisted living.  Now that he is no longer in charge of his medications he is not familiar with what medications he is taking.  He is agreeable with me calling to get a copy of his MAR to complete medication reconciliation.    Chronic systolic heart failure/HFrEF secondary to ICM  (Ef 20-25%): Notes that he is \"feeling pretty good.\"  If walking long time with a walker he will get short of breath with activity.  Notes that he is able to lie flat at night without shortness of breath.  He believes he checks his weight approximately once weekly.  Notes that his lower legs do swell but this is hard.  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    Cardiac Medications  Eliquis 5 mg BID  Entresto  mg BID  Metoprolol succinate 125 mg daily - a little more tired recently   Lasix 40 mg daily  Aldactone 25 mg daily  Atorvastatin 20 mg daily  Jardiance 10 mg daily - Follow-up BMP needed, recent start a few weeks ago     Atrial flutter: continues on beta-blocker and Eliquis twice daily.  Denies signs or symptoms of adverse effects.    Type 2 Diabetes: He himself is not aware of his insulin doses since all of his medications and insulins are administered by staff.  Notes that he believes his fasting blood sugar was 233 this morning.  Per medication " reconciliation he continues on Lantus 50 units once daily at bedtime, Humalog 16 units with breakfast and lunch and 18 units with dinner, Jardiance 10 mg daily, and Metformin 750 mg by mouth once daily.   Date Value Ref Range   09/23/2021 7.6 (H) <=5.6 %     LDL Cholesterol Calculated   Date Value Ref Range Status   06/21/2021 49 <100 mg/dL Final     Creatinine   Date Value Ref Range Status   03/24/2022 2.01 (H) 0.70 - 1.30 mg/dL Final   06/28/2021 1.33 (H) 0.66 - 1.25 mg/dL Final     GERD: Continues on pantoprazole daily, denies breakthrough reflux.      Anxiety: History of anxiety which she finds to be adequately controlled at this time.  He is currently taking 20 mg of citalopram daily, denies signs or symptoms of adverse effects.    CLL: He follows with oncology every 3 to 6 months.  Notes that he had been getting monthly Rituxan but does not remember why but he did not get his last infusion.  He is taking his daily oral medication, Venclexta.    Today's Vitals:   BP Readings from Last 3 Encounters:   03/09/22 117/68   02/08/22 117/54   12/03/21 130/60   ----------------    I spent 25 minutes with this patient today. I offer these suggestions for consideration by Dr. Guevara. A copy of the visit note was provided to the patient's provider(s).    The patient was mailed a summary of these recommendations.     Denny Ruiz, Katerina, BCACP  Medication Management (MTM) Pharmacist  [facility]    Telemedicine Visit Details  Type of service:  Telephone visit  Start Time: 11 AM  End Time: 11:25 AM  Originating Location (patient location): Home  Distant Location (provider location):  [facility]     Medication Therapy Recommendations  No medication therapy recommendations to display

## 2022-03-23 ENCOUNTER — PATIENT OUTREACH (OUTPATIENT)
Dept: CARDIOLOGY | Facility: CLINIC | Age: 82
End: 2022-03-23
Payer: COMMERCIAL

## 2022-03-23 ENCOUNTER — LAB REQUISITION (OUTPATIENT)
Dept: LAB | Facility: CLINIC | Age: 82
End: 2022-03-23
Payer: COMMERCIAL

## 2022-03-23 DIAGNOSIS — I50.9 ACUTE ON CHRONIC CONGESTIVE HEART FAILURE, UNSPECIFIED HEART FAILURE TYPE (H): ICD-10-CM

## 2022-03-23 DIAGNOSIS — I50.23 ACUTE ON CHRONIC SYSTOLIC (CONGESTIVE) HEART FAILURE (H): ICD-10-CM

## 2022-03-23 DIAGNOSIS — I50.22 CHRONIC SYSTOLIC HEART FAILURE (H): ICD-10-CM

## 2022-03-23 NOTE — TELEPHONE ENCOUNTER
Called to follow up and make sure [facility] got started on Jardiance and labs drawn.   Called nursing office at [facility] but was forwarded to an unknown number. Called [facility]. He reports feeling well. Weight this morning 191 which he reports he thinks it stable from last week. Per clinic note on 3/9:     Weights have been 182 on 3/5, 191 3/2, 192.6 on 2/23, 194 on 2/16, 2203 on 2/9, 2195 on 2/5 and 192.6 on 2/2.    He reports they cassie his labs this morning.   Will await lab results.   Verónica Jarquin RN

## 2022-03-24 LAB
ANION GAP SERPL CALCULATED.3IONS-SCNC: 13 MMOL/L (ref 5–18)
BUN SERPL-MCNC: 27 MG/DL (ref 8–28)
CALCIUM SERPL-MCNC: 9.5 MG/DL (ref 8.5–10.5)
CHLORIDE BLD-SCNC: 102 MMOL/L (ref 98–107)
CO2 SERPL-SCNC: 25 MMOL/L (ref 22–31)
CREAT SERPL-MCNC: 2.01 MG/DL (ref 0.7–1.3)
GFR SERPL CREATININE-BSD FRML MDRD: 33 ML/MIN/1.73M2
GLUCOSE BLD-MCNC: 303 MG/DL (ref 70–125)
POTASSIUM BLD-SCNC: 4.1 MMOL/L (ref 3.5–5)
SODIUM SERPL-SCNC: 140 MMOL/L (ref 136–145)

## 2022-03-24 PROCEDURE — 80048 BASIC METABOLIC PNL TOTAL CA: CPT | Mod: ORL | Performed by: PHYSICIAN ASSISTANT

## 2022-03-24 PROCEDURE — 36415 COLL VENOUS BLD VENIPUNCTURE: CPT | Mod: ORL | Performed by: PHYSICIAN ASSISTANT

## 2022-03-24 PROCEDURE — P9604 ONE-WAY ALLOW PRORATED TRIP: HCPCS | Mod: ORL | Performed by: PHYSICIAN ASSISTANT

## 2022-03-24 RX ORDER — TRIAMCINOLONE ACETONIDE 1 MG/G
CREAM TOPICAL 2 TIMES DAILY PRN
COMMUNITY
Start: 2022-03-02

## 2022-03-24 RX ORDER — ACETAMINOPHEN 325 MG/1
650 TABLET ORAL EVERY 4 HOURS PRN
COMMUNITY

## 2022-03-24 RX ORDER — ASPIRIN 81 MG/1
81 TABLET, CHEWABLE ORAL DAILY
COMMUNITY
End: 2023-01-01

## 2022-03-24 RX ORDER — FUROSEMIDE 20 MG
20 TABLET ORAL DAILY
Qty: 90 TABLET | Refills: 1 | Status: SHIPPED | OUTPATIENT
Start: 2022-03-24 | End: 2022-04-01

## 2022-03-24 NOTE — TELEPHONE ENCOUNTER
Labs reviewed by provider. Creatinine worse since starting Jardiance. Following orders given:  Date: 3/24/2022    Time of Call: 12:56 PM     Diagnosis:  HF     [ VORB ] Ordering provider: Deb JENNINGS  Order: Decrease Lasix from 40 mg daily to 20 mg daily. BMP and weight check in one week     Order received by: Verónica Jarquin RN      Follow-up/additional notes: will have orders faxed. Called [facility] nursing office and left message with order changes. Called [facility] and reviewed labs and changes. Asked him if I should also call Joyce to review and he said yes. Called Joyce, left message letting her know she can call us back with any questions .Verónica Jarquin RN

## 2022-03-25 NOTE — PATIENT INSTRUCTIONS
PLAN:                            Continue current medication regimen     Follow-up: Return in about 1 year (around 3/17/2023) for Follow up, with me, using a phone visit.

## 2022-03-30 ENCOUNTER — LAB REQUISITION (OUTPATIENT)
Dept: LAB | Facility: CLINIC | Age: 82
End: 2022-03-30
Payer: COMMERCIAL

## 2022-03-30 DIAGNOSIS — I50.9 HEART FAILURE, UNSPECIFIED (H): ICD-10-CM

## 2022-03-31 ENCOUNTER — PATIENT OUTREACH (OUTPATIENT)
Dept: CARDIOLOGY | Facility: CLINIC | Age: 82
End: 2022-03-31

## 2022-03-31 DIAGNOSIS — I50.9 ACUTE ON CHRONIC CONGESTIVE HEART FAILURE, UNSPECIFIED HEART FAILURE TYPE (H): ICD-10-CM

## 2022-03-31 DIAGNOSIS — I50.22 CHRONIC SYSTOLIC HEART FAILURE (H): ICD-10-CM

## 2022-03-31 LAB
ANION GAP SERPL CALCULATED.3IONS-SCNC: 11 MMOL/L (ref 5–18)
BUN SERPL-MCNC: 21 MG/DL (ref 8–28)
CALCIUM SERPL-MCNC: 8.9 MG/DL (ref 8.5–10.5)
CHLORIDE BLD-SCNC: 106 MMOL/L (ref 98–107)
CO2 SERPL-SCNC: 22 MMOL/L (ref 22–31)
CREAT SERPL-MCNC: 1.62 MG/DL (ref 0.7–1.3)
GFR SERPL CREATININE-BSD FRML MDRD: 43 ML/MIN/1.73M2
GLUCOSE BLD-MCNC: 166 MG/DL (ref 70–125)
POTASSIUM BLD-SCNC: 4.1 MMOL/L (ref 3.5–5)
SODIUM SERPL-SCNC: 139 MMOL/L (ref 136–145)

## 2022-03-31 PROCEDURE — 36415 COLL VENOUS BLD VENIPUNCTURE: CPT | Mod: ORL | Performed by: PHYSICIAN ASSISTANT

## 2022-03-31 PROCEDURE — 80048 BASIC METABOLIC PNL TOTAL CA: CPT | Mod: ORL | Performed by: PHYSICIAN ASSISTANT

## 2022-03-31 PROCEDURE — P9604 ONE-WAY ALLOW PRORATED TRIP: HCPCS | Mod: ORL | Performed by: PHYSICIAN ASSISTANT

## 2022-03-31 NOTE — TELEPHONE ENCOUNTER
Called [facility] to check in. Last week creatinine was elevated. Weight at that time was 191 lbs. Lasix was reduced from 40 mg daily to 20 mg daily.   Labs drawn today and creatinine improved. Called [facility] to check in. Voicemail full unable to leave message.   Called nursing office at [facility]. Left message asking them to call back or fax updated weights.   They called back and weight update:   3/23 - 191.8  3/30 - 193.4   Will update provider.

## 2022-04-01 RX ORDER — FUROSEMIDE 20 MG
20 TABLET ORAL DAILY
Qty: 90 TABLET | Refills: 1
Start: 2022-04-01 | End: 2022-01-01

## 2022-04-01 NOTE — TELEPHONE ENCOUNTER
Reviewed with Deb Lomas and called with following recommendations:  Date: 4/1/2022    Time of Call: 9:38 AM     Diagnosis:  HF     [ VORB ] Ordering provider: Deb JENNINGS  Order: Continue current regimen,. Should take extra 20 mg Lasix when weight greater than 195 lbs. Should call if weights not improving.      Order received by: Verónica Jarquin RN      Follow-up/additional notes: orders faxed to [facility] and left message with nursing office with updates. Tried to call [facility] finn3 but no answer and voicemail full.

## 2022-04-13 ENCOUNTER — TELEPHONE (OUTPATIENT)
Dept: OPHTHALMOLOGY | Facility: CLINIC | Age: 82
End: 2022-04-13
Payer: COMMERCIAL

## 2022-04-13 NOTE — TELEPHONE ENCOUNTER
Pt scheduled for diabetic eye exam this Friday.    No indication at this time oncology records would be needed    Able to work with oncology/records if indicated at visit this Friday    Stewart Miller RN 11:08 AM 04/13/22            [facility] [place], anaid #: [phone]    [facility] Call Center    Phone Message    May a detailed message be left on voicemail: yes     Reason for Call: Other:   Pt's sister is calling to notify us that she spoke with [facility] and they said in order to receive the records before pt's appt (on Friday), we (clinic) would have to call them to request for the records and it would be easier this way. Please follow-up with [facility] or [facility] to further advise.     Action Taken: Message routed to:  [facility] ([facility]):      Travel Screening:NA

## 2022-04-13 NOTE — PROGRESS NOTES
In person appointment    HPI:   Mr. Manning is a 81 year old male with a past medical history including CLL (ongoing treatment), CAD s/p CABG in 1995, HFrEF 2/2 ICM, atrial flutter, PVD s/p iliac artery stents and left lower extremity bypass, hypertension, diabetes, hyperlipidemia, prosthesis on leg for about 2-3 years now. discitis and multple admission for decompensated heart failure. Presents to clinic for CORE f/u.    He last saw CORE 12/3/21. His aldactone was increased. He got an extra dose of lasix for 2 days.     He is feeling good. Overall energy level is okay. No SOB at rest. He thinks he can walk 1/4 mile. Walking into clinic gave him just mild ALEXIS. LE edema in left leg is mild-to moderate. Prosthesis on r leg. He thinks he has some mild abdominal edema. He denies orthopnea, PND. He has some lightheadedness when he first wakes up and gets up. No dizziness. No palpitations or syncope. No chest pain. Appetite is good.    The doctor at [facility] comes about once per month.     His weight in clinic appears up here, but is pretty stable at home. Weights have been 4/13 194 lbs, 4/6 191.6, 4/5/22 196.8, 3/30 193.4, 3.23 191, 3/16 192    Cardiac Medications  Eliquis 5 mg BID  ASA 81 mg daily  Entresto  mg BID  Metoprolol succinate 125 mg daily  Gardiance 10 mg daily  Lasix 20 mg daily with an extra 20 mg as need for weight over 195 lbs  Aldactone 25 mg daily  Digoxin 125 mcg daily  Atorvastatin 20 mg daily    PAST MEDICAL HISTORY:  Past Medical History:   Diagnosis Date     ACS (acute coronary syndrome) (H) 6/20/2021     Acute respiratory failure with hypoxia (H) 4/6/2017     STORM (acute kidney injury) (H) 9/22/2018     Arthritis      ASCVD (arteriosclerotic cardiovascular disease)      Atrial fibrillation (H) 2/5/2018     Atrial flutter (H) 2/22/2017     BMI 30.0-30.9,adult      BPH (benign prostatic hypertrophy)      CAD S/P percutaneous coronary angioplasty 1/11/2014     Cellulitis      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 (H)      Chronic systolic heart failure (H) 2017     Confusion 2021     Coronary artery disease     triple bypass      CRF (chronic renal failure), stage 3 (moderate) (H) 2017     Critical lower limb ischemia (H) 1/10/2014     Diabetes mellitus (H)      Diabetes mellitus, type 2 (H) 2014     Diabetic polyneuropathy (H)      Discitis 2017     Epidural abscess 2017     Fall 2017     History of blood transfusion      HTN (hypertension) 2014     Hyperglycemia 2020     Hyperlipidaemia      Hyperlipidemia with target LDL less than 100 2014    Formatting of this note might be different from the original. Overview:  Diagnosis updated by automated process. Provider to review and confirm. Formatting of this note might be different from the original. Diagnosis updated by automated process. Provider to review and confirm.     Hypertension      Long term current use of anticoagulant therapy 2018     Non-healing ulcer of foot (H)     Right     Noninflammatory pericardial effusion      Osteomyelitis (H) 2014     Osteomyelitis of foot, right, acute (H) 2/15/2017     Osteomyelitis of left foot (H)      PVD (peripheral vascular disease) (H)      Recurrent falls 2021     Sebaceous cyst        FAMILY HISTORY:  Family History   Problem Relation Age of Onset     Cancer Mother         leukemia     Leukemia Mother        SOCIAL HISTORY:  Socioeconomic History     Marital status: Single   Tobacco Use     Smoking status: Former Smoker     Packs/day: 2.00     Years: 30.00     Pack years: 60.00     Types: Cigarettes     Last attempt to quit: 1995     Years since quittin.5     Smokeless tobacco: Never Used   Other Topics Concern     Parent/sibling w/ CABG, MI or angioplasty before 65F 55M? Not Asked   Social History Narrative    Lives independently with SO. 2017        CURRENT MEDICATIONS:  acetaminophen  (TYLENOL) 325 MG tablet, Take 650 mg by mouth every 4 hours as needed Max 3gm per day for minor pain/fever  alcohol swab prep pads, Use to swab area of injection/jina as directed.  apixaban ANTICOAGULANT (ELIQUIS ANTICOAGULANT) 5 MG tablet, Take 1 tablet (5 mg) by mouth 2 times daily  aspirin (ASA) 81 MG chewable tablet, Take 81 mg by mouth daily  atorvastatin (LIPITOR) 20 MG tablet, Take 1 tablet (20 mg) by mouth daily  Mahsa Protect (EUCERIN) external cream, Apply topically 2 times daily as needed for dry skin or other Apply as needed to intact stage 1 pressure area and/or irritated skin, rash or excoriation  bisacodyl (DULCOLAX) 10 MG suppository, Place 10 mg rectally daily as needed for constipation If no BM or small BM for 3 days/9 shifts  blood glucose (NO BRAND SPECIFIED) test strip, Use to test blood sugar 4 times daily or as directed.  blood glucose calibration (NO BRAND SPECIFIED) solution, 1 drop every 14 days Use 1 drop to calibrate blood glucose monitor every 14 days  blood glucose monitoring (NO BRAND SPECIFIED) meter device kit, Use to test blood sugar 4 times daily or as directed.  cholecalciferol 1000 UNITS TABS, Take 1,000 Units by mouth daily  citalopram (CELEXA) 20 MG tablet, Take 20 mg by mouth every morning   digoxin (LANOXIN) 125 MCG tablet, Take 1 tablet (125 mcg) by mouth daily  empagliflozin (JARDIANCE) 10 MG TABS tablet, Take 1 tablet (10 mg) by mouth daily  furosemide (LASIX) 20 MG tablet, Take 1 tablet (20 mg) by mouth daily Take an additional 20 mg if weight over 195 lbs.  insulin glargine (LANTUS PEN) 100 UNIT/ML pen, Inject 50 Units Subcutaneous At Bedtime  insulin lispro (HUMALOG KWIKPEN) 100 UNIT/ML (1 unit dial) KWIKPEN, Inject 16 Units Subcutaneous 2 times daily (before meals) Breakfast and lunch  insulin lispro (HUMALOG KWIKPEN) 100 UNIT/ML (1 unit dial) KWIKPEN, Inject 18 Units Subcutaneous daily (with dinner)  loperamide (IMODIUM A-D) 2 MG tablet, Take 2-4 mg by mouth 4 times  daily as needed for diarrhea Take 4 mg after 1st loose stool, then take 1 tablet as needed after each loose stool  metFORMIN (GLUCOPHAGE-XR) 750 MG 24 hr tablet, Take 750 mg by mouth every morning  metoprolol succinate ER (TOPROL-XL) 100 MG 24 hr tablet, Take 1 tablet (100 mg) by mouth daily Take with 25 mg tab for total of 125 mg daily  metoprolol succinate ER (TOPROL-XL) 25 MG 24 hr tablet, Take 1 tablet (25 mg) by mouth daily Take with 100 mg tab to equal 125 mg daily.  nitroglycerin (NITROSTAT) 0.4 MG sublingual tablet, For chest pain place 1 tablet under the tongue every 5 minutes for 3 doses. If symptoms persist 5 minutes after 1st dose call 911.  nystatin (MYCOSTATIN) 063875 UNIT/GM external powder, Apply topically 2 times daily as needed (to affected area(s) as needed)  order for DME, Equipment to be ordered:  Scale- Qty 1  Commander Flex - Qty. 1  Pulse-Oximeter - Qty. 1  Use as directed  order for DME, Equipment being ordered: [facility] shoe  pantoprazole (PROTONIX) 40 MG EC tablet, Take 40 mg by mouth daily  polyethylene glycol (MIRALAX) 17 GM/Dose powder, Take 17 g by mouth daily as needed  sacubitril-valsartan (ENTRESTO)  MG per tablet, Take 1 tablet by mouth 2 times daily  spironolactone (ALDACTONE) 25 MG tablet, Take 1 tablet (25 mg) by mouth daily  thin (NO BRAND SPECIFIED) lancets, 1 each 4 times daily Use with lanceting device.  traZODone (DESYREL) 50 MG tablet, Take 50 mg by mouth At Bedtime  triamcinolone (KENALOG) 0.1 % external cream, Apply topically to right stump, left hand and right forearm twice daily for rash  ULTICARE SHORT 31G X 8 MM insulin pen needle, Inject 1 each Subcutaneous 4 times daily  venetoclax (VENCLEXTA) 100 MG tablet, Take 200 mg by mouth every morning  magnesium hydroxide (MILK OF MAGNESIA) 400 MG/5ML suspension, Take 30 mLs by mouth daily as needed for constipation or heartburn If no BM/small BM for 3 days/9 shifts    No current facility-administered medications on file  "prior to visit.      ROS:   See HPI    EXAM:   /55 (BP Location: Left arm, Patient Position: Chair, Cuff Size: Adult Regular)   Pulse 68   Ht 1.801 m (5' 10.9\")   Wt 89.8 kg (198 lb)   SpO2 98%   BMI 27.69 kg/m       GENERAL: Appears comfortable, in no acute distress.   HEENT: Eye symmetrical, no discharge or icterus bilaterally. Mucous membranes moist and without lesions.  CV: RRR, +S1S2, no murmur, rub, or gallop. JVP mid neck ate at 90 degrees.   RESPIRATORY: Respirations regular, even, and unlabored. Lungs CTA throughout.   GI: Soft and non distended   EXTREMITIES: AKA of the right leg. Left leg with trace edema. Left leg is warm and well perfused.  NEUROLOGIC: Alert and interacting appropriately. No focal deficits.   MUSCULOSKELETAL: No joint swelling or tenderness.   SKIN: No jaundice. No rashes or lesions.     Labs, reviewed with patient in clinic today:  CBC RESULTS:  Lab Results   Component Value Date    WBC 3.0 (L) 09/25/2021    WBC 3.7 (L) 06/26/2021    RBC 3.40 (L) 09/25/2021    RBC 3.09 (L) 06/26/2021    HGB 10.4 (L) 09/25/2021    HGB 9.6 (L) 06/26/2021    HCT 32.2 (L) 09/25/2021    HCT 28.6 (L) 06/26/2021    MCV 95 09/25/2021    MCV 93 06/26/2021    MCH 30.6 09/25/2021    MCH 31.1 06/26/2021    MCHC 32.3 09/25/2021    MCHC 33.6 06/26/2021    RDW 16.9 (H) 09/25/2021    RDW 14.6 06/26/2021    PLT 99 (L) 09/25/2021     (L) 06/26/2021       CMP RESULTS:  Lab Results   Component Value Date     04/14/2022     06/28/2021    POTASSIUM 4.0 04/14/2022    POTASSIUM 4.1 06/28/2021    CHLORIDE 105 04/14/2022    CHLORIDE 101 12/30/2021    CHLORIDE 109 06/28/2021    CO2 23 04/14/2022    CO2 24 06/28/2021    ANIONGAP 11 04/14/2022    ANIONGAP 4 06/28/2021     (H) 04/14/2022     (H) 06/28/2021    BUN 32 (H) 04/14/2022    BUN 43 (H) 06/28/2021    CR 1.69 (H) 04/14/2022    CR 1.33 (H) 06/28/2021    GFRESTIMATED 41 (L) 04/14/2022    GFRESTIMATED >60 07/08/2021    GFRESTIMATED 51 " (L) 06/28/2021    GFRESTBLACK >60 07/08/2021    GFRESTBLACK 59 (L) 06/28/2021    JUSTINO 9.5 04/14/2022    JUSTINO 9.4 06/28/2021    BILITOTAL 0.8 09/23/2021    BILITOTAL 0.9 06/20/2021    ALBUMIN 3.0 (L) 09/23/2021    ALBUMIN 3.3 (L) 06/20/2021    ALKPHOS 84 09/23/2021    ALKPHOS 72 06/20/2021    ALT 12 09/23/2021    ALT 24 06/20/2021    AST 12 09/23/2021    AST 34 06/20/2021        INR RESULTS:  Lab Results   Component Value Date    INR 1.05 04/16/2021       Lab Results   Component Value Date    MAG 1.8 09/25/2021    MAG 2.0 06/23/2021     Lab Results   Component Value Date    NTBNPI 7,364 (H) 06/20/2021     Lab Results   Component Value Date    NTBNP 22,172 (H) 02/09/2018       Diagnostics:   TTE 6/20/21  1. Left ventricular systolic function is severely reduced. The visual ejection  fraction is estimated at 20-25%.  2. The right ventricle is normal in structure, function and size.  3. The left atrium is moderately dilated.  4. The aortic valve is trileaflet with aortic valve sclerosis.  5. There is mild (1+) mitral regurgitation.  ______________________________________________________________________________      TTE 10/5/18  The visual ejection fraction is estimated at 35-40%.  There is mild-moderate global hypokinesia of the left ventricle.  Mildly decreased right ventricular systolic function  In comparison with the previous study the LVEF has improved slightly.    Assessment and Plan:   Mr. Manning is a 81 year old male with a past medical history including CLL in remission, CAD s/p CABG in 1995, HFrEF 2/2 ICM, atrial flutter, PVD s/p iliac artery stents and left lower extremity bypass, hypertension, diabetes, hyperlipidemia, prosthesis on leg, discitis. Presents to clinic for CORE f/u.    Patient is recently moved into assisted living which is led to a lot more consistency in taking his medications.Unfortunately, his EF has worsened on his most recent echo. It is now 20 to 25%.  We are titrating his neurohormonal  blockade. Then we will recheck an echo. If he remains below 35% we will need to discuss ICD.    # Chronic systolic heart failure/HFrEF secondary to ICM  (Ef 20-25%)  Stage C. NYHA Class IIIA  Fluid status: euvolemic, continue current diuretics  ACEi/ARB/ARNI: Entresto 97/103 BID -max dose acheived  BB: increase metoprolol XL to 150 mg daily, ongoing titration  Aldosterone antagonist: Aldactone 25 mg  SGLT2i- Jardiance 10 mg daily  SCD prophylaxis: n/a EF now below 35%- will need to consider if not improving with CHF med titration, repeat ECHO 3 months after max medication achieved, plan for ECHO around August  NSAID use: contraindicated  Sleep apnea evaluation: not discussed today  Remote monitoring: consider at future visits, although now lives at an assisted living where he has more supervision    # CAD s/p CABG  # PAD s/p iliac stenting and LE bypass  - No anginal symptoms, he is on eliquis and statin (defer high potency to cardiologist); he has stable claudication    # Atrial flutter  - He is on BB and elquis    # CKD stage 3b  - Cr stable at his baseline    # T2DM  - Managed by Dr. Velarde  - Will discuss farxiga addition    # CLL, on oral therapy, done with infusions at this point.    Follow-up  - Dr. Leon in May as scheduled  - RN phone call in 4 weeks (inrease more metoprolol if possible)  - CORE clinic in 2 months  - CORE clinic in August with ECHO    Billing  - I managed 2+ stable chronic conditions  - I changed a prescription medication      Carly Lomas PA-C  [facility]  [facility]  FABY LEON

## 2022-04-14 ENCOUNTER — LAB (OUTPATIENT)
Dept: LAB | Facility: CLINIC | Age: 82
End: 2022-04-14
Attending: PHYSICIAN ASSISTANT
Payer: COMMERCIAL

## 2022-04-14 ENCOUNTER — OFFICE VISIT (OUTPATIENT)
Dept: CARDIOLOGY | Facility: CLINIC | Age: 82
End: 2022-04-14
Attending: PHYSICIAN ASSISTANT
Payer: COMMERCIAL

## 2022-04-14 VITALS
OXYGEN SATURATION: 98 % | SYSTOLIC BLOOD PRESSURE: 118 MMHG | WEIGHT: 198 LBS | DIASTOLIC BLOOD PRESSURE: 55 MMHG | HEIGHT: 71 IN | BODY MASS INDEX: 27.72 KG/M2 | HEART RATE: 68 BPM

## 2022-04-14 DIAGNOSIS — I50.22 CHRONIC SYSTOLIC HEART FAILURE (H): ICD-10-CM

## 2022-04-14 DIAGNOSIS — I50.9 ACUTE ON CHRONIC CONGESTIVE HEART FAILURE, UNSPECIFIED HEART FAILURE TYPE (H): ICD-10-CM

## 2022-04-14 DIAGNOSIS — I50.22 CHRONIC SYSTOLIC HEART FAILURE (H): Primary | ICD-10-CM

## 2022-04-14 LAB
ANION GAP SERPL CALCULATED.3IONS-SCNC: 11 MMOL/L (ref 3–14)
BUN SERPL-MCNC: 32 MG/DL (ref 7–30)
CALCIUM SERPL-MCNC: 9.5 MG/DL (ref 8.5–10.1)
CHLORIDE BLD-SCNC: 105 MMOL/L (ref 94–109)
CO2 SERPL-SCNC: 23 MMOL/L (ref 20–32)
CREAT SERPL-MCNC: 1.69 MG/DL (ref 0.66–1.25)
GFR SERPL CREATININE-BSD FRML MDRD: 41 ML/MIN/1.73M2
GLUCOSE BLD-MCNC: 250 MG/DL (ref 70–99)
NT-PROBNP SERPL-MCNC: 2136 PG/ML (ref 0–450)
POTASSIUM BLD-SCNC: 4 MMOL/L (ref 3.4–5.3)
SODIUM SERPL-SCNC: 139 MMOL/L (ref 133–144)

## 2022-04-14 PROCEDURE — G0463 HOSPITAL OUTPT CLINIC VISIT: HCPCS

## 2022-04-14 PROCEDURE — 80048 BASIC METABOLIC PNL TOTAL CA: CPT | Performed by: PATHOLOGY

## 2022-04-14 PROCEDURE — 99214 OFFICE O/P EST MOD 30 MIN: CPT | Performed by: PHYSICIAN ASSISTANT

## 2022-04-14 PROCEDURE — 83880 ASSAY OF NATRIURETIC PEPTIDE: CPT | Performed by: PATHOLOGY

## 2022-04-14 PROCEDURE — 36415 COLL VENOUS BLD VENIPUNCTURE: CPT | Performed by: PATHOLOGY

## 2022-04-14 RX ORDER — METOPROLOL SUCCINATE 100 MG/1
150 TABLET, EXTENDED RELEASE ORAL DAILY
Qty: 135 TABLET | Refills: 1 | Status: SHIPPED | OUTPATIENT
Start: 2022-04-14 | End: 2022-01-01

## 2022-04-14 ASSESSMENT — PAIN SCALES - GENERAL: PAINLEVEL: NO PAIN (0)

## 2022-04-14 NOTE — LETTER
4/14/2022      RE: Tad Manning  [address] [place] [id number]       Dear Colleague,    Thank you for the opportunity to participate in the care of your patient, Tad Manning, at the [facility] [place] [facility]. Please see a copy of my visit note below.    In person appointment    HPI:   Mr. Manning is a 81 year old male with a past medical history including CLL (ongoing treatment), CAD s/p CABG in 1995, HFrEF 2/2 ICM, atrial flutter, PVD s/p iliac artery stents and left lower extremity bypass, hypertension, diabetes, hyperlipidemia, prosthesis on leg for about 2-3 years now. discitis and multple admission for decompensated heart failure. Presents to clinic for CORE f/u.    He last saw CORE 12/3/21. His aldactone was increased. He got an extra dose of lasix for 2 days.     He is feeling good. Overall energy level is okay. No SOB at rest. He thinks he can walk 1/4 mile. Walking into clinic gave him just mild ALEXIS. LE edema in left leg is mild-to moderate. Prosthesis on r leg. He thinks he has some mild abdominal edema. He denies orthopnea, PND. He has some lightheadedness when he first wakes up and gets up. No dizziness. No palpitations or syncope. No chest pain. Appetite is good.    The doctor at [facility] comes about once per month.     His weight in clinic appears up here, but is pretty stable at home. Weights have been 4/13 194 lbs, 4/6 191.6, 4/5/22 196.8, 3/30 193.4, 3.23 191, 3/16 192    Cardiac Medications  Eliquis 5 mg BID  ASA 81 mg daily  Entresto  mg BID  Metoprolol succinate 125 mg daily  Gardiance 10 mg daily  Lasix 20 mg daily with an extra 20 mg as need for weight over 195 lbs  Aldactone 25 mg daily  Digoxin 125 mcg daily  Atorvastatin 20 mg daily    PAST MEDICAL HISTORY:  Past Medical History:   Diagnosis Date     ACS (acute coronary syndrome) (H) 6/20/2021     Acute respiratory failure with  hypoxia (H) 4/6/2017     STORM (acute kidney injury) (H) 9/22/2018     Arthritis      ASCVD (arteriosclerotic cardiovascular disease)      Atrial fibrillation (H) 2/5/2018     Atrial flutter (H) 2/22/2017     BMI 30.0-30.9,adult      BPH (benign prostatic hypertrophy)      CAD S/P percutaneous coronary angioplasty 1/11/2014     Cellulitis      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)      Chronic systolic heart failure (H) 6/6/2017     Confusion 6/20/2021     Coronary artery disease     triple bypass 1995     CRF (chronic renal failure), stage 3 (moderate) (H) 1/20/2017     Critical lower limb ischemia (H) 1/10/2014     Diabetes mellitus (H)      Diabetes mellitus, type 2 (H) 1/11/2014     Diabetic polyneuropathy (H)      Discitis 2/8/2017     Epidural abscess 2/9/2017     Fall 2/7/2017     History of blood transfusion      HTN (hypertension) 1/11/2014     Hyperglycemia 1/9/2020     Hyperlipidaemia      Hyperlipidemia with target LDL less than 100 1/11/2014    Formatting of this note might be different from the original. Overview:  Diagnosis updated by automated process. Provider to review and confirm. Formatting of this note might be different from the original. Diagnosis updated by automated process. Provider to review and confirm.     Hypertension      Long term current use of anticoagulant therapy 2/5/2018     Non-healing ulcer of foot (H)     Right     Noninflammatory pericardial effusion      Osteomyelitis (H) 1/11/2014     Osteomyelitis of foot, right, acute (H) 2/15/2017     Osteomyelitis of left foot (H)      PVD (peripheral vascular disease) (H)      Recurrent falls 6/20/2021     Sebaceous cyst        FAMILY HISTORY:  Family History   Problem Relation Age of Onset     Cancer Mother         leukemia     Leukemia Mother        SOCIAL HISTORY:  Socioeconomic History     Marital status: Single   Tobacco Use     Smoking status: Former Smoker     Packs/day: 2.00     Years:  30.00     Pack years: 60.00     Types: Cigarettes     Last attempt to quit: 1995     Years since quittin.5     Smokeless tobacco: Never Used   Other Topics Concern     Parent/sibling w/ CABG, MI or angioplasty before 65F 55M? Not Asked   Social History Narrative    Lives independently with SO. 2017        CURRENT MEDICATIONS:  acetaminophen (TYLENOL) 325 MG tablet, Take 650 mg by mouth every 4 hours as needed Max 3gm per day for minor pain/fever  alcohol swab prep pads, Use to swab area of injection/jina as directed.  apixaban ANTICOAGULANT (ELIQUIS ANTICOAGULANT) 5 MG tablet, Take 1 tablet (5 mg) by mouth 2 times daily  aspirin (ASA) 81 MG chewable tablet, Take 81 mg by mouth daily  atorvastatin (LIPITOR) 20 MG tablet, Take 1 tablet (20 mg) by mouth daily  Mahsa Protect (EUCERIN) external cream, Apply topically 2 times daily as needed for dry skin or other Apply as needed to intact stage 1 pressure area and/or irritated skin, rash or excoriation  bisacodyl (DULCOLAX) 10 MG suppository, Place 10 mg rectally daily as needed for constipation If no BM or small BM for 3 days/9 shifts  blood glucose (NO BRAND SPECIFIED) test strip, Use to test blood sugar 4 times daily or as directed.  blood glucose calibration (NO BRAND SPECIFIED) solution, 1 drop every 14 days Use 1 drop to calibrate blood glucose monitor every 14 days  blood glucose monitoring (NO BRAND SPECIFIED) meter device kit, Use to test blood sugar 4 times daily or as directed.  cholecalciferol 1000 UNITS TABS, Take 1,000 Units by mouth daily  citalopram (CELEXA) 20 MG tablet, Take 20 mg by mouth every morning   digoxin (LANOXIN) 125 MCG tablet, Take 1 tablet (125 mcg) by mouth daily  empagliflozin (JARDIANCE) 10 MG TABS tablet, Take 1 tablet (10 mg) by mouth daily  furosemide (LASIX) 20 MG tablet, Take 1 tablet (20 mg) by mouth daily Take an additional 20 mg if weight over 195 lbs.  insulin glargine (LANTUS PEN) 100 UNIT/ML pen, Inject 50 Units  Subcutaneous At Bedtime  insulin lispro (HUMALOG KWIKPEN) 100 UNIT/ML (1 unit dial) KWIKPEN, Inject 16 Units Subcutaneous 2 times daily (before meals) Breakfast and lunch  insulin lispro (HUMALOG KWIKPEN) 100 UNIT/ML (1 unit dial) KWIKPEN, Inject 18 Units Subcutaneous daily (with dinner)  loperamide (IMODIUM A-D) 2 MG tablet, Take 2-4 mg by mouth 4 times daily as needed for diarrhea Take 4 mg after 1st loose stool, then take 1 tablet as needed after each loose stool  metFORMIN (GLUCOPHAGE-XR) 750 MG 24 hr tablet, Take 750 mg by mouth every morning  metoprolol succinate ER (TOPROL-XL) 100 MG 24 hr tablet, Take 1 tablet (100 mg) by mouth daily Take with 25 mg tab for total of 125 mg daily  metoprolol succinate ER (TOPROL-XL) 25 MG 24 hr tablet, Take 1 tablet (25 mg) by mouth daily Take with 100 mg tab to equal 125 mg daily.  nitroglycerin (NITROSTAT) 0.4 MG sublingual tablet, For chest pain place 1 tablet under the tongue every 5 minutes for 3 doses. If symptoms persist 5 minutes after 1st dose call 911.  nystatin (MYCOSTATIN) 819551 UNIT/GM external powder, Apply topically 2 times daily as needed (to affected area(s) as needed)  order for DME, Equipment to be ordered:  Scale- Qty 1  Commander Flex - Qty. 1  Pulse-Oximeter - Qty. 1  Use as directed  order for DME, Equipment being ordered: Critical access hospital shoe  pantoprazole (PROTONIX) 40 MG EC tablet, Take 40 mg by mouth daily  polyethylene glycol (MIRALAX) 17 GM/Dose powder, Take 17 g by mouth daily as needed  sacubitril-valsartan (ENTRESTO)  MG per tablet, Take 1 tablet by mouth 2 times daily  spironolactone (ALDACTONE) 25 MG tablet, Take 1 tablet (25 mg) by mouth daily  thin (NO BRAND SPECIFIED) lancets, 1 each 4 times daily Use with lanceting device.  traZODone (DESYREL) 50 MG tablet, Take 50 mg by mouth At Bedtime  triamcinolone (KENALOG) 0.1 % external cream, Apply topically to right stump, left hand and right forearm twice daily for rash  ULTICARE SHORT 31G X 8 MM  "insulin pen needle, Inject 1 each Subcutaneous 4 times daily  venetoclax (VENCLEXTA) 100 MG tablet, Take 200 mg by mouth every morning  magnesium hydroxide (MILK OF MAGNESIA) 400 MG/5ML suspension, Take 30 mLs by mouth daily as needed for constipation or heartburn If no BM/small BM for 3 days/9 shifts    No current facility-administered medications on file prior to visit.      ROS:   See HPI    EXAM:   /55 (BP Location: Left arm, Patient Position: Chair, Cuff Size: Adult Regular)   Pulse 68   Ht 1.801 m (5' 10.9\")   Wt 89.8 kg (198 lb)   SpO2 98%   BMI 27.69 kg/m       GENERAL: Appears comfortable, in no acute distress.   HEENT: Eye symmetrical, no discharge or icterus bilaterally. Mucous membranes moist and without lesions.  CV: RRR, +S1S2, no murmur, rub, or gallop. JVP mid neck ate at 90 degrees.   RESPIRATORY: Respirations regular, even, and unlabored. Lungs CTA throughout.   GI: Soft and non distended   EXTREMITIES: AKA of the right leg. Left leg with trace edema. Left leg is warm and well perfused.  NEUROLOGIC: Alert and interacting appropriately. No focal deficits.   MUSCULOSKELETAL: No joint swelling or tenderness.   SKIN: No jaundice. No rashes or lesions.     Labs, reviewed with patient in clinic today:  CBC RESULTS:  Lab Results   Component Value Date    WBC 3.0 (L) 09/25/2021    WBC 3.7 (L) 06/26/2021    RBC 3.40 (L) 09/25/2021    RBC 3.09 (L) 06/26/2021    HGB 10.4 (L) 09/25/2021    HGB 9.6 (L) 06/26/2021    HCT 32.2 (L) 09/25/2021    HCT 28.6 (L) 06/26/2021    MCV 95 09/25/2021    MCV 93 06/26/2021    MCH 30.6 09/25/2021    MCH 31.1 06/26/2021    MCHC 32.3 09/25/2021    MCHC 33.6 06/26/2021    RDW 16.9 (H) 09/25/2021    RDW 14.6 06/26/2021    PLT 99 (L) 09/25/2021     (L) 06/26/2021       CMP RESULTS:  Lab Results   Component Value Date     04/14/2022     06/28/2021    POTASSIUM 4.0 04/14/2022    POTASSIUM 4.1 06/28/2021    CHLORIDE 105 04/14/2022    CHLORIDE 101 " 12/30/2021    CHLORIDE 109 06/28/2021    CO2 23 04/14/2022    CO2 24 06/28/2021    ANIONGAP 11 04/14/2022    ANIONGAP 4 06/28/2021     (H) 04/14/2022     (H) 06/28/2021    BUN 32 (H) 04/14/2022    BUN 43 (H) 06/28/2021    CR 1.69 (H) 04/14/2022    CR 1.33 (H) 06/28/2021    GFRESTIMATED 41 (L) 04/14/2022    GFRESTIMATED >60 07/08/2021    GFRESTIMATED 51 (L) 06/28/2021    GFRESTBLACK >60 07/08/2021    GFRESTBLACK 59 (L) 06/28/2021    JUSTINO 9.5 04/14/2022    JUSTINO 9.4 06/28/2021    BILITOTAL 0.8 09/23/2021    BILITOTAL 0.9 06/20/2021    ALBUMIN 3.0 (L) 09/23/2021    ALBUMIN 3.3 (L) 06/20/2021    ALKPHOS 84 09/23/2021    ALKPHOS 72 06/20/2021    ALT 12 09/23/2021    ALT 24 06/20/2021    AST 12 09/23/2021    AST 34 06/20/2021        INR RESULTS:  Lab Results   Component Value Date    INR 1.05 04/16/2021       Lab Results   Component Value Date    MAG 1.8 09/25/2021    MAG 2.0 06/23/2021     Lab Results   Component Value Date    NTBNPI 7,364 (H) 06/20/2021     Lab Results   Component Value Date    NTBNP 22,172 (H) 02/09/2018       Diagnostics:   TTE 6/20/21  1. Left ventricular systolic function is severely reduced. The visual ejection  fraction is estimated at 20-25%.  2. The right ventricle is normal in structure, function and size.  3. The left atrium is moderately dilated.  4. The aortic valve is trileaflet with aortic valve sclerosis.  5. There is mild (1+) mitral regurgitation.  ______________________________________________________________________________      TTE 10/5/18  The visual ejection fraction is estimated at 35-40%.  There is mild-moderate global hypokinesia of the left ventricle.  Mildly decreased right ventricular systolic function  In comparison with the previous study the LVEF has improved slightly.    Assessment and Plan:   Mr. Manning is a 81 year old male with a past medical history including CLL in remission, CAD s/p CABG in 1995, HFrEF 2/2 ICM, atrial flutter, PVD s/p iliac artery  stents and left lower extremity bypass, hypertension, diabetes, hyperlipidemia, prosthesis on leg, discitis. Presents to clinic for CORE f/u.    Patient is recently moved into assisted living which is led to a lot more consistency in taking his medications.Unfortunately, his EF has worsened on his most recent echo. It is now 20 to 25%.  We are titrating his neurohormonal blockade. Then we will recheck an echo. If he remains below 35% we will need to discuss ICD.    # Chronic systolic heart failure/HFrEF secondary to ICM  (Ef 20-25%)  Stage C. NYHA Class IIIA  Fluid status: euvolemic, continue current diuretics  ACEi/ARB/ARNI: Entresto 97/103 BID -max dose acheived  BB: increase metoprolol XL to 150 mg daily, ongoing titration  Aldosterone antagonist: Aldactone 25 mg  SGLT2i- Jardiance 10 mg daily  SCD prophylaxis: n/a EF now below 35%- will need to consider if not improving with CHF med titration, repeat ECHO 3 months after max medication achieved, plan for ECHO around August  NSAID use: contraindicated  Sleep apnea evaluation: not discussed today  Remote monitoring: consider at future visits, although now lives at an assisted living where he has more supervision    # CAD s/p CABG  # PAD s/p iliac stenting and LE bypass  - No anginal symptoms, he is on eliquis and statin (defer high potency to cardiologist); he has stable claudication    # Atrial flutter  - He is on BB and elquis    # CKD stage 3b  - Cr stable at his baseline    # T2DM  - Managed by Dr. Velarde  - Will discuss farxiga addition    # CLL, on oral therapy, done with infusions at this point.    Follow-up  - Dr. Valdes in May as scheduled  - RN phone call in 4 weeks (inrease more metoprolol if possible)  - CORE clinic in 2 months  - CORE clinic in August with ECHO    Billing  - I managed 2+ stable chronic conditions  - I changed a prescription medication      Carly Lomas PA-C  [facility]  [facility]      Please  do not hesitate to contact me if you have any questions/concerns.     Sincerely,     Carly Lomas PA-C

## 2022-04-14 NOTE — NURSING NOTE
Chief Complaint   Patient presents with     Follow Up     81 year old male with chronic systolic heart failure presents for follow up with labs prior     Vitals were taken and medications were reconciled.   Dhaval Nuno, EMT  9:31 AM

## 2022-04-14 NOTE — NURSING NOTE
Diet: Patient instructed regarding a heart healthy diet, including discussion of reduced fat and sodium intake. Patient demonstrated understanding of this information and agreed to call with further questions or concerns.  Labs: Patient was given results of the laboratory testing obtained today. . Patient demonstrated understanding of this information and agreed to call with further questions or concerns.   Return Appointment: Patient given instructions regarding scheduling next clinic visit. Patient demonstrated understanding of this information and agreed to call with further questions or concerns.  Medication Change: Patient was educated regarding prescribed medication change, including discussion of the indication, administration, side effects, and when to report to MD or RN. Patient demonstrated understanding of this information and agreed to call with further questions or concerns.  Increase Metoprolol to 150 mg daily. Orders updated and faxed to Assisted Living nursing office. Called and spoke with RN in office and gave verbal update of orders.     Patient stated he understood all health information given and agreed to call with further questions or concerns.  Verónica Jarquin RN

## 2022-04-14 NOTE — PATIENT INSTRUCTIONS
Take your medicines every day, as directed    Changes made today:  Increase metoprolol to 150 mg once a day   Monitor Your Weight and Symptoms    Contact us if you:    Gain 2 pounds in one day or 5 pounds in one week  Feel more short of breath  Notice more leg swelling  Feel lightheadeded   Change your lifestyle    Limit Salt or Sodium:  2000 mg  Limit Fluids:  2000 mL or approximately 64 ounces  Eat a Heart Healthy Diet  Low in saturated fats  Stay Active:  Aim to move at least 150 minutes every  week         To Contact us    During Business Hours:  [phone], option # 1      After hours, weekends or holidays:   [phone], Option #4  Ask to speak to the On-Call Cardiologist. Inform them you are a CORE/heart failure patient at the [facility].     Use [employer] allows you to communicate directly with your heart team through secure messaging.  [employer] can be accessed any time on your phone, computer, or tablet.  If you need assistance, we'd be happy to help!         Keep your Heart Appointments:    - RN phone call in 2 weeks and 4 weeks to check in on heart rate    - Dr. Valdes as schedule 5/2    - CORE clinic in 2 months- please schedule    - CORE clinic with an ECHO in 4-5 months

## 2022-04-15 ENCOUNTER — OFFICE VISIT (OUTPATIENT)
Dept: OPHTHALMOLOGY | Facility: CLINIC | Age: 82
End: 2022-04-15
Attending: OPTOMETRIST
Payer: COMMERCIAL

## 2022-04-15 DIAGNOSIS — H35.3131 EARLY DRY STAGE NONEXUDATIVE AGE-RELATED MACULAR DEGENERATION OF BOTH EYES: Primary | ICD-10-CM

## 2022-04-15 DIAGNOSIS — H52.221 MYOPIA OF RIGHT EYE WITH REGULAR ASTIGMATISM: ICD-10-CM

## 2022-04-15 DIAGNOSIS — H52.11 MYOPIA OF RIGHT EYE WITH REGULAR ASTIGMATISM: ICD-10-CM

## 2022-04-15 DIAGNOSIS — H35.3131 EARLY DRY STAGE NONEXUDATIVE AGE-RELATED MACULAR DEGENERATION OF BOTH EYES: ICD-10-CM

## 2022-04-15 DIAGNOSIS — H31.002 CHORIORETINAL SCAR OF LEFT EYE: ICD-10-CM

## 2022-04-15 DIAGNOSIS — Z96.1 PSEUDOPHAKIA OF BOTH EYES: ICD-10-CM

## 2022-04-15 PROCEDURE — 92134 CPTRZ OPH DX IMG PST SGM RTA: CPT | Performed by: OPTOMETRIST

## 2022-04-15 PROCEDURE — G0463 HOSPITAL OUTPT CLINIC VISIT: HCPCS | Mod: 25

## 2022-04-15 PROCEDURE — 92004 COMPRE OPH EXAM NEW PT 1/>: CPT | Performed by: OPTOMETRIST

## 2022-04-15 ASSESSMENT — TONOMETRY
OS_IOP_MMHG: 17
OD_IOP_MMHG: 17
IOP_METHOD: TONOPEN

## 2022-04-15 ASSESSMENT — VISUAL ACUITY
METHOD: SNELLEN - LINEAR
OS_SC: 20/30
OD_SC: 20/200
OS_SC+: -2

## 2022-04-15 ASSESSMENT — EXTERNAL EXAM - LEFT EYE: OS_EXAM: NORMAL

## 2022-04-15 ASSESSMENT — CUP TO DISC RATIO
OD_RATIO: 0.2
OS_RATIO: 0.2

## 2022-04-15 ASSESSMENT — CONF VISUAL FIELD
OS_NORMAL: 1
OD_NORMAL: 1

## 2022-04-15 ASSESSMENT — REFRACTION_MANIFEST
OS_ADD: +2.75
OD_CYLINDER: +0.50
OD_SPHERE: -3.25
OD_AXIS: 136
OS_AXIS: 134
OD_ADD: +2.75
OS_CYLINDER: +0.25
OS_SPHERE: PLANO

## 2022-04-15 ASSESSMENT — EXTERNAL EXAM - RIGHT EYE: OD_EXAM: NORMAL

## 2022-04-15 ASSESSMENT — SLIT LAMP EXAM - LIDS
COMMENTS: NORMAL
COMMENTS: NORMAL

## 2022-04-15 NOTE — NURSING NOTE
Chief Complaints and History of Present Illnesses   Patient presents with     New Patient     Chief Complaint(s) and History of Present Illness(es)     New Patient               Comments     Pt states that he needs a new comprehensive exam and wants a rx since his glasses broke. He says it has been many years since he has had an eye check.    DM1  BGL: cannot remember  Lab Results       Component                Value               Date                       A1C                      7.6                 09/23/2021                 A1C                      9.0                 06/20/2021                 A1C                      14.1                01/09/2020                 A1C                      9.0                 09/22/2018                 A1C                      6.1                 02/10/2017                 A1C                      7.0                 01/21/2017              Jimmy Del Real OT 9:24 AM April 15, 2022

## 2022-04-15 NOTE — PROGRESS NOTES
Assessment & Plan       Tad Manning is a 81 year old male with the following diagnoses:   1. Early dry stage nonexudative age-related macular degeneration of both eyes - Both Eyes    2. Pseudophakia of both eyes - Both Eyes    3. 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 Myopia of right eye with regular astigmatism - Right Eye       OCT old macular hole  right eye with  Surface irregularities  IOL clear and centerred  Old chorioretinal  Scar   New prescription for glasses   yearly exam         Patient disposition:   No follow-ups on file.          Complete documentation of historical and exam elements from today's encounter can be found in the full encounter summary report (not reduplicated in this progress note). I personally obtained the chief complaint(s) and history of present illness.  I confirmed and edited as necessary the review of systems, past medical/surgical history, family history, social history, and examination findings as documented by others; and I examined the patient myself. I personally reviewed the relevant tests, images, and reports as documented above. I formulated and edited as necessary the assessment and plan and discussed the findings and management plan with the patient and family.  Dr. Ender Hendrickson

## 2022-04-15 NOTE — PATIENT INSTRUCTIONS
OCT old macular hole  right eye with  Surface irregularities  IOL clear and centerred  Old chorioretinal  Scar   New prescription for glasses   yearly exam

## 2022-04-25 DIAGNOSIS — I50.22 CHRONIC SYSTOLIC HEART FAILURE (H): Primary | ICD-10-CM

## 2022-04-27 ENCOUNTER — MEDICAL CORRESPONDENCE (OUTPATIENT)
Dept: HEALTH INFORMATION MANAGEMENT | Facility: CLINIC | Age: 82
End: 2022-04-27
Payer: COMMERCIAL

## 2022-05-01 ENCOUNTER — HEALTH MAINTENANCE LETTER (OUTPATIENT)
Age: 82
End: 2022-05-01

## 2022-05-02 ENCOUNTER — OFFICE VISIT (OUTPATIENT)
Dept: CARDIOLOGY | Facility: CLINIC | Age: 82
End: 2022-05-02
Attending: INTERNAL MEDICINE
Payer: COMMERCIAL

## 2022-05-02 ENCOUNTER — MEDICAL CORRESPONDENCE (OUTPATIENT)
Dept: HEALTH INFORMATION MANAGEMENT | Facility: CLINIC | Age: 82
End: 2022-05-02

## 2022-05-02 ENCOUNTER — LAB (OUTPATIENT)
Dept: LAB | Facility: CLINIC | Age: 82
End: 2022-05-02
Attending: INTERNAL MEDICINE
Payer: COMMERCIAL

## 2022-05-02 VITALS
OXYGEN SATURATION: 100 % | BODY MASS INDEX: 27.43 KG/M2 | SYSTOLIC BLOOD PRESSURE: 118 MMHG | DIASTOLIC BLOOD PRESSURE: 51 MMHG | HEART RATE: 73 BPM | WEIGHT: 196.1 LBS

## 2022-05-02 DIAGNOSIS — I50.9 ACUTE ON CHRONIC CONGESTIVE HEART FAILURE, UNSPECIFIED HEART FAILURE TYPE (H): Primary | ICD-10-CM

## 2022-05-02 DIAGNOSIS — N18.30 CRF (CHRONIC RENAL FAILURE), STAGE 3 (MODERATE) (H): ICD-10-CM

## 2022-05-02 DIAGNOSIS — I50.22 CHRONIC SYSTOLIC HEART FAILURE (H): ICD-10-CM

## 2022-05-02 DIAGNOSIS — I25.5 ISCHEMIC CARDIOMYOPATHY: ICD-10-CM

## 2022-05-02 DIAGNOSIS — I48.0 PAROXYSMAL A-FIB (H): ICD-10-CM

## 2022-05-02 LAB
ANION GAP SERPL CALCULATED.3IONS-SCNC: 10 MMOL/L (ref 3–14)
BUN SERPL-MCNC: 32 MG/DL (ref 7–30)
CALCIUM SERPL-MCNC: 9.5 MG/DL (ref 8.5–10.1)
CHLORIDE BLD-SCNC: 106 MMOL/L (ref 94–109)
CO2 SERPL-SCNC: 24 MMOL/L (ref 20–32)
CREAT SERPL-MCNC: 2.07 MG/DL (ref 0.66–1.25)
GFR SERPL CREATININE-BSD FRML MDRD: 32 ML/MIN/1.73M2
GLUCOSE BLD-MCNC: 100 MG/DL (ref 70–99)
POTASSIUM BLD-SCNC: 4.2 MMOL/L (ref 3.4–5.3)
SODIUM SERPL-SCNC: 140 MMOL/L (ref 133–144)

## 2022-05-02 PROCEDURE — G0463 HOSPITAL OUTPT CLINIC VISIT: HCPCS

## 2022-05-02 PROCEDURE — 36415 COLL VENOUS BLD VENIPUNCTURE: CPT | Performed by: PATHOLOGY

## 2022-05-02 PROCEDURE — 80048 BASIC METABOLIC PNL TOTAL CA: CPT | Performed by: PATHOLOGY

## 2022-05-02 PROCEDURE — 99214 OFFICE O/P EST MOD 30 MIN: CPT | Mod: GC | Performed by: INTERNAL MEDICINE

## 2022-05-02 RX ORDER — FUROSEMIDE 40 MG
40 TABLET ORAL DAILY
Qty: 3 TABLET | Refills: 0 | Status: SHIPPED | OUTPATIENT
Start: 2022-05-02 | End: 2022-01-01

## 2022-05-02 ASSESSMENT — PAIN SCALES - GENERAL: PAINLEVEL: NO PAIN (0)

## 2022-05-02 NOTE — PROGRESS NOTES
History  Mr. Manning is a 81 year old male with a medical history including CLL (ongoing treatment), CAD s/p CABG in 1995, HFrEF 2/2 ICM, atrial flutter, PVD s/p iliac artery stents and left lower extremity bypass, hypertension, diabetes, hyperlipidemia, prosthesis on leg for about 2-3 years now. He has had multple admission for decompensated heart failure in the past - none since September 2021.      He has been seen a few times in the [facility] since visit with me in 11/2021.  He is tolerating the highest dose of Entresto  mg BID and GDMT for HF He denies any chest pain, orthopnea, PND, palpitations, presyncope or syncope. He reports slight increase in shortness of breath with activity, productive cough of clear sputum and his weight has increased to 194-196 lbs in recent days.      Cardiac Medications  Eliquis 5 mg BID  ASA 81 mg daily  Entresto  mg BID  Metoprolol succinate 150 mg daily  Gardiance 10 mg daily  Lasix 20 mg daily with an extra 20 mg as need for weight over 195 lbs (patient reports that he doesn't take extra doses of lasix with weight increase)  Aldactone 25 mg daily  Digoxin 125 mcg daily  Atorvastatin 20 mg daily    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)      Chronic systolic heart failure (H) 6/6/2017     Confusion 6/20/2021     Coronary artery disease     triple bypass 1995     CRF (chronic renal failure), stage 3 (moderate) (H) 1/20/2017     Critical lower limb  ischemia (H) 1/10/2014     Diabetes mellitus (H)      Diabetes mellitus, type 2 (H) 2014     Diabetic polyneuropathy (H)      Discitis 2017     Epidural abscess 2017     Fall 2017     History of blood transfusion      HTN (hypertension) 2014     Hyperglycemia 2020     Hyperlipidaemia      Hyperlipidemia with target LDL less than 100 2014    Formatting of this note might be different from the original. Overview:  Diagnosis updated by automated process. Provider to review and confirm. Formatting of this note might be different from the original. Diagnosis updated by automated process. Provider to review and confirm.     Hypertension      Long term current use of anticoagulant therapy 2018     Non-healing ulcer of foot (H)     Right     Noninflammatory pericardial effusion      Osteomyelitis (H) 2014     Osteomyelitis of foot, right, acute (H) 2/15/2017     Osteomyelitis of left foot (H)      PVD (peripheral vascular disease) (H)      Recurrent falls 2021     Sebaceous cyst        FAMILY HISTORY:  Family History   Problem Relation Age of Onset     Cancer Mother         leukemia     Leukemia Mother        SOCIAL HISTORY:  Socioeconomic History     Marital status: Single   Tobacco Use     Smoking status: Former Smoker     Packs/day: 2.00     Years: 30.00     Pack years: 60.00     Types: Cigarettes     Last attempt to quit: 1995     Years since quittin.5     Smokeless tobacco: Never Used   Other Topics Concern     Parent/sibling w/ CABG, MI or angioplasty before 65F 55M? Not Asked   Social History Narrative    Lives independently with SO. 2017        CURRENT MEDICATIONS:  Current Outpatient Medications   Medication Sig     acetaminophen (TYLENOL) 325 MG tablet Take 650 mg by mouth every 4 hours as needed Max 3gm per day for minor pain/fever     alcohol swab prep pads Use to swab area of injection/jina as directed.     apixaban ANTICOAGULANT (ELIQUIS  ANTICOAGULANT) 5 MG tablet Take 1 tablet (5 mg) by mouth 2 times daily     aspirin (ASA) 81 MG chewable tablet Take 81 mg by mouth daily     atorvastatin (LIPITOR) 20 MG tablet Take 1 tablet (20 mg) by mouth daily     Mahsa Protect (EUCERIN) external cream Apply topically 2 times daily as needed for dry skin or other Apply as needed to intact stage 1 pressure area and/or irritated skin, rash or excoriation     bisacodyl (DULCOLAX) 10 MG suppository Place 10 mg rectally daily as needed for constipation If no BM or small BM for 3 days/9 shifts     blood glucose (NO BRAND SPECIFIED) test strip Use to test blood sugar 4 times daily or as directed.     blood glucose calibration (NO BRAND SPECIFIED) solution 1 drop every 14 days Use 1 drop to calibrate blood glucose monitor every 14 days     blood glucose monitoring (NO BRAND SPECIFIED) meter device kit Use to test blood sugar 4 times daily or as directed.     cholecalciferol 1000 UNITS TABS Take 1,000 Units by mouth daily     citalopram (CELEXA) 20 MG tablet Take 20 mg by mouth every morning      digoxin (LANOXIN) 125 MCG tablet Take 1 tablet (125 mcg) by mouth daily     empagliflozin (JARDIANCE) 10 MG TABS tablet Take 1 tablet (10 mg) by mouth daily     furosemide (LASIX) 20 MG tablet Take 1 tablet (20 mg) by mouth daily Take an additional 20 mg if weight over 195 lbs.     insulin glargine (LANTUS PEN) 100 UNIT/ML pen Inject 50 Units Subcutaneous At Bedtime     insulin lispro (HUMALOG KWIKPEN) 100 UNIT/ML (1 unit dial) KWIKPEN Inject 16 Units Subcutaneous 2 times daily (before meals) Breakfast and lunch     insulin lispro (HUMALOG KWIKPEN) 100 UNIT/ML (1 unit dial) KWIKPEN Inject 18 Units Subcutaneous daily (with dinner)     loperamide (IMODIUM A-D) 2 MG tablet Take 2-4 mg by mouth 4 times daily as needed for diarrhea Take 4 mg after 1st loose stool, then take 1 tablet as needed after each loose stool     magnesium hydroxide (MILK OF MAGNESIA) 400 MG/5ML suspension Take  30 mLs by mouth daily as needed for constipation or heartburn If no BM/small BM for 3 days/9 shifts     metFORMIN (GLUCOPHAGE-XR) 750 MG 24 hr tablet Take 750 mg by mouth every morning     metoprolol succinate ER (TOPROL-XL) 100 MG 24 hr tablet Take 1.5 tablets (150 mg) by mouth daily     nitroglycerin (NITROSTAT) 0.4 MG sublingual tablet For chest pain place 1 tablet under the tongue every 5 minutes for 3 doses. If symptoms persist 5 minutes after 1st dose call 911.     nystatin (MYCOSTATIN) 277526 UNIT/GM external powder Apply topically 2 times daily as needed (to affected area(s) as needed)     order for DME Equipment to be ordered:  Scale- Qty 1  Commander Flex - Qty. 1  Pulse-Oximeter - Qty. 1  Use as directed     order for DME Equipment being ordered: DH2 shoe     pantoprazole (PROTONIX) 40 MG EC tablet Take 40 mg by mouth daily     polyethylene glycol (MIRALAX) 17 GM/Dose powder Take 17 g by mouth daily as needed     sacubitril-valsartan (ENTRESTO)  MG per tablet Take 1 tablet by mouth 2 times daily     spironolactone (ALDACTONE) 25 MG tablet Take 1 tablet (25 mg) by mouth daily     thin (NO BRAND SPECIFIED) lancets 1 each 4 times daily Use with lanceting device.     traZODone (DESYREL) 50 MG tablet Take 50 mg by mouth At Bedtime     triamcinolone (KENALOG) 0.1 % external cream Apply topically to right stump, left hand and right forearm twice daily for rash     ULTICARE SHORT 31G X 8 MM insulin pen needle Inject 1 each Subcutaneous 4 times daily     venetoclax (VENCLEXTA) 100 MG tablet Take 200 mg by mouth every morning     No current facility-administered medications for this visit.         ROS:   See HPI    EXAM:   /51 (BP Location: Right arm, Patient Position: Chair, Cuff Size: Adult Regular)   Pulse 73   Wt 89 kg (196 lb 1.6 oz)   SpO2 100%   BMI 27.43 kg/m     In general, the patient is in no apparent distress.      HEENT: Sclerae white, not injected.    Neck: No JVD. No  thyromegaly  Heart: normal rate, irregular rhythm. Normal S1, S2. 2/6 Systolic murmur, rub, click, or gallop. +2 LLE edema, BKA of the RLE   Lungs: Reduced breath sounds both bases.  No rhonchi, wheezes, rales.   GI: Soft, nontender, nondistended.   Extremities: +2 pitting edema in LLE edema.  Right BKA  Neuro: grossly non focal.   Psych: pleasant and conversant      Labs, reviewed with patient in clinic today:  Chemistry panel: Recent Labs   Lab Test 04/14/22  0901 03/31/22  0823 09/25/21  0617 09/25/21  0555 09/24/21  0612 09/24/21  0451 09/23/21  0654 09/23/21  0505 09/22/21 2229 09/22/21 1957    139   < > 136  --  138  --  138  --  141   POTASSIUM 4.0 4.1   < > 3.9  --  4.2  --  3.7  --  4.4   CHLORIDE 105 106   < > 105  --  105  --  105  --  108*   CO2 23 22   < > 23  --  24  --  24  --  22   ANIONGAP 11 11   < > 8  --  9  --  9  --  11   * 166*   < > 190*   < > 146*   < > 184*   < > 61*   BUN 32* 21   < > 26  --  23  --  18  --  18   CR 1.69* 1.62*   < > 1.40*  --  1.37*  --  1.42*  --  1.39*   JUSTINO 9.5 8.9   < > 9.0  --  9.1  --  8.9  --  9.6   MAG  --   --   --  1.8  --  2.0  --  1.6*  --  1.8   GFRESTIMATED 41* 43*   < > 48*  --  49*  --  47*  --  48*   AST  --   --   --   --   --   --   --  12  --  19   ALT  --   --   --   --   --   --   --  12  --  14    < > = values in this interval not displayed.       CBC:   Recent Labs   Lab Test 09/25/21  0555 09/24/21  0452   WBC 3.0* 3.5*   RBC 3.40* 3.36*   HGB 10.4* 10.1*   HCT 32.2* 31.3*   MCV 95 93   MCH 30.6 30.1   MCHC 32.3 32.3   RDW 16.9* 17.2*   PLT 99* 109*       Lipid Panel:  Recent Labs   Lab Test 06/21/21  0522 05/18/20  1117 04/09/18  0951 05/23/14  0745   CHOL 116 121   < > 134   HDL 38* 36*   < > 34*   LDL 49 60   < > 48   TRIG 145 123   < > 257*   CHOLHDLRATIO  --   --   --  3.9    < > = values in this interval not displayed.       Thyroid:   TSH   Date Value Ref Range Status   10/03/2018 1.11 0.40 - 4.00 mU/L Final     No results  found for: T4  Hemoglobin A1C POCT   Date Value Ref Range Status   06/20/2021 9.0 (H) 0 - 5.6 % Final     Comment:     Normal <5.7% Prediabetes 5.7-6.4%  Diabetes 6.5% or higher - adopted from ADA   consensus guidelines.       Hemoglobin A1C   Date Value Ref Range Status   09/23/2021 7.6 (H) <=5.6 % Final     Comment:       Prediabetes: 5.7 to 6.4%        Diabetes:  >=6.5%     Patients with Hgb F >5%, total bilirubin >10.0 mg/dL, abnormal red cell turnover, severe renal or hepatic disease or malignancy should not have this A1C method used to diagnose or monitor diabetes.      INR   Date Value Ref Range Status   04/16/2021 1.05 0.86 - 1.14 Final       Echocardiograms:   June 2021     1. Left ventricular systolic function is severely reduced. The visual ejection fraction is estimated at 20-25%.  2. The right ventricle is normal in structure, function and size.  3. The aortic valve is trileaflet with aortic valve sclerosis.  4. There is mild (1+) mitral regurgitation.     2/15/2018  Severe left ventricular dilation is present.  Biplane LV EF 20%.  Severe diffuse hypokinesis is present.  Mild to moderate right ventricular dilation is present.  Global right ventricular function is moderately to severely reduced.  Dilation of the inferior vena cava is present with abnormal respiratory variation in diameter.  No pericardial effusion is present.  A left pleural effusion is present.     10/2015  Left ventricular function  Is moderately reduced, estimated at 35-40%. There is mild-moderate global hypokinesia of the left ventricle.  Mildly decreased right ventricular systolic function     6/20/21    1. Left ventricular systolic function is severely reduced. The visual ejection  fraction is estimated at 20-25%.  2. The right ventricle is normal in structure, function and size.  3. The left atrium is moderately dilated.  4. The aortic valve is trileaflet with aortic valve sclerosis.  5. There is mild (1+) mitral  regurgitation.        LC 3/2018       [facility] 6/19/21    1.  Severe multivessel CAD s/p CABG.                LM has severe stenosis.                LAD has sever proximal stenosis and midLAD is occluded. Distal LAD fills via patent LIMA.                LCx is occluded.                 RCA has moderate diffuse disease.  2.  LIMA to LAD is patent.  Mid to distal LAD stents are patent.  3.  SVG to OM is patent.  4.  SVG to Diagonal is patent with a 70% stenosis and supplies diagonal branches are small and diffusely diseased.        Assessment and Plan:      Tad Manning is a 80 yo male with     HFrEF (Stage C, NYHA III)  CAD, s/p CABG L-LAD, SVG-OM1, SVG-D1 in 1995.   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 He has ischemic cardiomyopathy and chronic systolic heart failure with a LV ejection fraction 20 - 25%. On exam today he is mildly volume up. His blood pressure is 118/51 today. He is on guideline directed medical therapy including aspirin, atorvastatin, metoprolol, Entresto, spironolactone and SGLT2i. He is tolerating digoxin. His most recent lipid panel is at goal. Dig level in June 2021 was 0.4. His creatinine is 2.07 today (from 1.69 on 4/14/22). Will plan on higher dose of Lasix dose.      He is tolerating apixaban well without any bleeding issues with paroxysmal atrial fibrillation.  He appears to be in a regular rhythm on auscultation today.  September '21 EKG was notable for sinus rhythm.     Plan:   -Increase furosemide to 40 mg daily for the next 3 days   - BMP check in 1 week   -He has 2 upcoming appointments and in the [facility] - after we assess him later this week, we will communicate with patient which of the 2 appointments he should keep.    ATTENDING ATTESTATION:  This patient has been seen  and examined by me May 2, 2022 with Dr. Preston, cardiology fellow. I have reviewed the vitals, laboratory and imaging data relevant to this patient's care. I have edited this note to reflect our joint assessment and plan, and discussed the plan with the patient.    Clarisse Valdes MD, MS  Professor of Medicine  Cardiovascular division

## 2022-05-02 NOTE — NURSING NOTE
Pt to increase lasix dose to 40 mg for 3 days (5/4, 5/5, 5/6). Then resume dose of 20 mg daily on 5/7. Attempted to call pt's nurse, Ana, but she did not answer. Will try to call again tomorrow.     Recheck BMP in about 1 week.     Keep previously scheduled appointments with [facility].     Follow up with Dr. Valdes in 6 months.     Lazaro Felton RN   Cardiology Nurse Coordinator

## 2022-05-02 NOTE — LETTER
5/2/2022      RE: Tad Manning  [address] [place] [id number]       Dear Colleague,    Thank you for the opportunity to participate in the care of your patient, Tad Manning, at the [facility] [place] [facility]. Please see a copy of my visit note below.    History  Mr. Manning is a 81 year old male with a medical history including CLL (ongoing treatment), CAD s/p CABG in 1995, HFrEF 2/2 ICM, atrial flutter, PVD s/p iliac artery stents and left lower extremity bypass, hypertension, diabetes, hyperlipidemia, prosthesis on leg for about 2-3 years now. He has had [facility] admission for decompensated heart failure in the past - none since September 2021.      He has been seen a few times in the core clinic since visit with me in 11/2021.  He is tolerating the highest dose of Entresto  mg BID and GDMT for HF He denies any chest pain, orthopnea, PND, palpitations, presyncope or syncope. He reports slight increase in shortness of breath with activity, productive cough of clear sputum and his weight has increased to 194-196 lbs in recent days.      Cardiac Medications  Eliquis 5 mg BID  ASA 81 mg daily  Entresto  mg BID  Metoprolol succinate 150 mg daily  Gardiance 10 mg daily  Lasix 20 mg daily with an extra 20 mg as need for weight over 195 lbs (patient reports that he doesn't take extra doses of lasix with weight increase)  Aldactone 25 mg daily  Digoxin 125 mcg daily  Atorvastatin 20 mg daily    PAST MEDICAL HISTORY:  Past Medical History:   Diagnosis Date     ACS (acute coronary syndrome) (H) 6/20/2021     Acute respiratory failure with hypoxia (H) 4/6/2017     STORM (acute kidney injury) (H) 9/22/2018     Arthritis      ASCVD (arteriosclerotic cardiovascular disease)      Atrial fibrillation (H) 2/5/2018     Atrial flutter (H) 2/22/2017     BMI 30.0-30.9,adult      BPH (benign prostatic hypertrophy)      CAD S/P  percutaneous coronary angioplasty 2014     Cellulitis      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)      Chronic systolic heart failure (H) 2017     Confusion 2021     Coronary artery disease     triple bypass      CRF (chronic renal failure), stage 3 (moderate) (H) 2017     Critical lower limb ischemia (H) 1/10/2014     Diabetes mellitus (H)      Diabetes mellitus, type 2 (H) 2014     Diabetic polyneuropathy (H)      Discitis 2017     Epidural abscess 2017     Fall 2017     History of blood transfusion      HTN (hypertension) 2014     Hyperglycemia 2020     Hyperlipidaemia      Hyperlipidemia with target LDL less than 100 2014    Formatting of this note might be different from the original. Overview:  Diagnosis updated by automated process. Provider to review and confirm. Formatting of this note might be different from the original. Diagnosis updated by automated process. Provider to review and confirm.     Hypertension      Long term current use of anticoagulant therapy 2018     Non-healing ulcer of foot (H)     Right     Noninflammatory pericardial effusion      Osteomyelitis (H) 2014     Osteomyelitis of foot, right, acute (H) 2/15/2017     Osteomyelitis of left foot (H)      PVD (peripheral vascular disease) (H)      Recurrent falls 2021     Sebaceous cyst        FAMILY HISTORY:  Family History   Problem Relation Age of Onset     Cancer Mother         leukemia     Leukemia Mother        SOCIAL HISTORY:  Socioeconomic History     Marital status: Single   Tobacco Use     Smoking status: Former Smoker     Packs/day: 2.00     Years: 30.00     Pack years: 60.00     Types: Cigarettes     Last attempt to quit: 1995     Years since quittin.5     Smokeless tobacco: Never Used   Other Topics Concern     Parent/sibling w/ CABG, MI or angioplasty before 65F 55M? Not Asked   Social History Narrative    Lives  independently with SO. 2/9/2017        CURRENT MEDICATIONS:  Current Outpatient Medications   Medication Sig     acetaminophen (TYLENOL) 325 MG tablet Take 650 mg by mouth every 4 hours as needed Max 3gm per day for minor pain/fever     alcohol swab prep pads Use to swab area of injection/jina as directed.     apixaban ANTICOAGULANT (ELIQUIS ANTICOAGULANT) 5 MG tablet Take 1 tablet (5 mg) by mouth 2 times daily     aspirin (ASA) 81 MG chewable tablet Take 81 mg by mouth daily     atorvastatin (LIPITOR) 20 MG tablet Take 1 tablet (20 mg) by mouth daily     Mahsa Protect (EUCERIN) external cream Apply topically 2 times daily as needed for dry skin or other Apply as needed to intact stage 1 pressure area and/or irritated skin, rash or excoriation     bisacodyl (DULCOLAX) 10 MG suppository Place 10 mg rectally daily as needed for constipation If no BM or small BM for 3 days/9 shifts     blood glucose (NO BRAND SPECIFIED) test strip Use to test blood sugar 4 times daily or as directed.     blood glucose calibration (NO BRAND SPECIFIED) solution 1 drop every 14 days Use 1 drop to calibrate blood glucose monitor every 14 days     blood glucose monitoring (NO BRAND SPECIFIED) meter device kit Use to test blood sugar 4 times daily or as directed.     cholecalciferol 1000 UNITS TABS Take 1,000 Units by mouth daily     citalopram (CELEXA) 20 MG tablet Take 20 mg by mouth every morning      digoxin (LANOXIN) 125 MCG tablet Take 1 tablet (125 mcg) by mouth daily     empagliflozin (JARDIANCE) 10 MG TABS tablet Take 1 tablet (10 mg) by mouth daily     furosemide (LASIX) 20 MG tablet Take 1 tablet (20 mg) by mouth daily Take an additional 20 mg if weight over 195 lbs.     insulin glargine (LANTUS PEN) 100 UNIT/ML pen Inject 50 Units Subcutaneous At Bedtime     insulin lispro (HUMALOG KWIKPEN) 100 UNIT/ML (1 unit dial) KWIKPEN Inject 16 Units Subcutaneous 2 times daily (before meals) Breakfast and lunch     insulin lispro (HUMALOG  KWIKPEN) 100 UNIT/ML (1 unit dial) KWIKPEN Inject 18 Units Subcutaneous daily (with dinner)     loperamide (IMODIUM A-D) 2 MG tablet Take 2-4 mg by mouth 4 times daily as needed for diarrhea Take 4 mg after 1st loose stool, then take 1 tablet as needed after each loose stool     magnesium hydroxide (MILK OF MAGNESIA) 400 MG/5ML suspension Take 30 mLs by mouth daily as needed for constipation or heartburn If no BM/small BM for 3 days/9 shifts     metFORMIN (GLUCOPHAGE-XR) 750 MG 24 hr tablet Take 750 mg by mouth every morning     metoprolol succinate ER (TOPROL-XL) 100 MG 24 hr tablet Take 1.5 tablets (150 mg) by mouth daily     nitroglycerin (NITROSTAT) 0.4 MG sublingual tablet For chest pain place 1 tablet under the tongue every 5 minutes for 3 doses. If symptoms persist 5 minutes after 1st dose call 911.     nystatin (MYCOSTATIN) 024906 UNIT/GM external powder Apply topically 2 times daily as needed (to affected area(s) as needed)     order for DME Equipment to be ordered:  Scale- Qty 1  Commander Flex - Qty. 1  Pulse-Oximeter - Qty. 1  Use as directed     order for DME Equipment being ordered: DH2 shoe     pantoprazole (PROTONIX) 40 MG EC tablet Take 40 mg by mouth daily     polyethylene glycol (MIRALAX) 17 GM/Dose powder Take 17 g by mouth daily as needed     sacubitril-valsartan (ENTRESTO)  MG per tablet Take 1 tablet by mouth 2 times daily     spironolactone (ALDACTONE) 25 MG tablet Take 1 tablet (25 mg) by mouth daily     thin (NO BRAND SPECIFIED) lancets 1 each 4 times daily Use with lanceting device.     traZODone (DESYREL) 50 MG tablet Take 50 mg by mouth At Bedtime     triamcinolone (KENALOG) 0.1 % external cream Apply topically to right stump, left hand and right forearm twice daily for rash     ULTICARE SHORT 31G X 8 MM insulin pen needle Inject 1 each Subcutaneous 4 times daily     venetoclax (VENCLEXTA) 100 MG tablet Take 200 mg by mouth every morning     No current facility-administered  medications for this visit.         ROS:   See HPI    EXAM:   /51 (BP Location: Right arm, Patient Position: Chair, Cuff Size: Adult Regular)   Pulse 73   Wt 89 kg (196 lb 1.6 oz)   SpO2 100%   BMI 27.43 kg/m     In general, the patient is in no apparent distress.      HEENT: Sclerae white, not injected.    Neck: No JVD. No thyromegaly  Heart: normal rate, irregular rhythm. Normal S1, S2. 2/6 Systolic murmur, rub, click, or gallop. +2 LLE edema, BKA of the RLE   Lungs: Reduced breath sounds both bases.  No rhonchi, wheezes, rales.   GI: Soft, nontender, nondistended.   Extremities: +2 pitting edema in LLE edema.  Right BKA  Neuro: grossly non focal.   Psych: pleasant and conversant      Labs, reviewed with patient in clinic today:  Chemistry panel: Recent Labs   Lab Test 04/14/22  0901 03/31/22  0823 09/25/21  0617 09/25/21  0555 09/24/21  0612 09/24/21  0451 09/23/21  0654 09/23/21  0505 09/22/21  2229 09/22/21  1957    139   < > 136  --  138  --  138  --  141   POTASSIUM 4.0 4.1   < > 3.9  --  4.2  --  3.7  --  4.4   CHLORIDE 105 106   < > 105  --  105  --  105  --  108*   CO2 23 22   < > 23  --  24  --  24  --  22   ANIONGAP 11 11   < > 8  --  9  --  9  --  11   * 166*   < > 190*   < > 146*   < > 184*   < > 61*   BUN 32* 21   < > 26  --  23  --  18  --  18   CR 1.69* 1.62*   < > 1.40*  --  1.37*  --  1.42*  --  1.39*   JUSTINO 9.5 8.9   < > 9.0  --  9.1  --  8.9  --  9.6   MAG  --   --   --  1.8  --  2.0  --  1.6*  --  1.8   GFRESTIMATED 41* 43*   < > 48*  --  49*  --  47*  --  48*   AST  --   --   --   --   --   --   --  12  --  19   ALT  --   --   --   --   --   --   --  12  --  14    < > = values in this interval not displayed.       CBC:   Recent Labs   Lab Test 09/25/21  0555 09/24/21  0452   WBC 3.0* 3.5*   RBC 3.40* 3.36*   HGB 10.4* 10.1*   HCT 32.2* 31.3*   MCV 95 93   MCH 30.6 30.1   MCHC 32.3 32.3   RDW 16.9* 17.2*   PLT 99* 109*       Lipid Panel:  Recent Labs   Lab Test  06/21/21  0522 05/18/20  1117 04/09/18  0951 05/23/14  0745   CHOL 116 121   < > 134   HDL 38* 36*   < > 34*   LDL 49 60   < > 48   TRIG 145 123   < > 257*   CHOLHDLRATIO  --   --   --  3.9    < > = values in this interval not displayed.       Thyroid:   TSH   Date Value Ref Range Status   10/03/2018 1.11 0.40 - 4.00 mU/L Final     No results found for: T4  Hemoglobin A1C POCT   Date Value Ref Range Status   06/20/2021 9.0 (H) 0 - 5.6 % Final     Comment:     Normal <5.7% Prediabetes 5.7-6.4%  Diabetes 6.5% or higher - adopted from ADA   consensus guidelines.       Hemoglobin A1C   Date Value Ref Range Status   09/23/2021 7.6 (H) <=5.6 % Final     Comment:       Prediabetes: 5.7 to 6.4%        Diabetes:  >=6.5%     Patients with Hgb F >5%, total bilirubin >10.0 mg/dL, abnormal red cell turnover, severe renal or hepatic disease or malignancy should not have this A1C method used to diagnose or monitor diabetes.      INR   Date Value Ref Range Status   04/16/2021 1.05 0.86 - 1.14 Final       Echocardiograms:   June 2021     1. Left ventricular systolic function is severely reduced. The visual ejection fraction is estimated at 20-25%.  2. The right ventricle is normal in structure, function and size.  3. The aortic valve is trileaflet with aortic valve sclerosis.  4. There is mild (1+) mitral regurgitation.     2/15/2018  Severe left ventricular dilation is present.  Biplane LV EF 20%.  Severe diffuse hypokinesis is present.  Mild to moderate right ventricular dilation is present.  Global right ventricular function is moderately to severely reduced.  Dilation of the inferior vena cava is present with abnormal respiratory variation in diameter.  No pericardial effusion is present.  A left pleural effusion is present.     10/2015  Left ventricular function  Is moderately reduced, estimated at 35-40%. There is mild-moderate global hypokinesia of the left ventricle.  Mildly decreased right ventricular systolic  function     6/20/21    1. Left ventricular systolic function is severely reduced. The visual ejection  fraction is estimated at 20-25%.  2. The right ventricle is normal in structure, function and size.  3. The left atrium is moderately dilated.  4. The aortic valve is trileaflet with aortic valve sclerosis.  5. There is mild (1+) mitral regurgitation.        [facility] 3/2018       [facility] 6/19/21    1.  Severe multivessel CAD s/p CABG.                LM has severe stenosis.                LAD has sever proximal stenosis and midLAD is occluded. Distal LAD fills via patent LIMA.                LCx is occluded.                 RCA has moderate diffuse disease.  2.  LIMA to LAD is patent.  Mid to distal LAD stents are patent.  3.  SVG to OM is patent.  4.  SVG to Diagonal is patent with a 70% stenosis and supplies diagonal branches are small and diffusely diseased.        Assessment and Plan:      Tad Manning is a 82 yo male with     HFrEF (Stage C, NYHA III)  CAD, s/p CABG L-LAD, SVG-OM1, SVG-D1 in 1995.   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 He has ischemic cardiomyopathy and chronic systolic heart failure with a LV ejection fraction 20 - 25%. On exam today he is mildly volume up. His blood pressure is 118/51 today. He is on guideline directed medical therapy including aspirin, atorvastatin, metoprolol, Entresto, spironolactone and SGLT2i. He is tolerating digoxin. His most recent lipid panel is at goal. Dig level in June 2021 was 0.4. His creatinine is 2.07 today (from 1.69 on 4/14/22). Will plan on higher dose of Lasix dose.      He is tolerating apixaban well without any bleeding issues with paroxysmal atrial fibrillation.  He appears to be in a regular rhythm on auscultation today.  September '21 EKG was  notable for sinus rhythm.     Plan:   -Increase furosemide to 40 mg daily for the next 3 days   - BMP check in 1 week   -He has 2 upcoming appointments and in the C[facility] clinic - after we assess him later this week, we will communicate with patient which of the 2 appointments he should keep.    ATTENDING ATTESTATION:  This patient has been seen and examined by me May 2, 2022 with Dr. Preston, cardiology fellow. I have reviewed the vitals, laboratory and imaging data relevant to this patient's care. I have edited this note to reflect our joint assessment and plan, and discussed the plan with the patient.    Clarisse Valdes MD, MS  Professor of Medicine  Cardiovascular division

## 2022-05-02 NOTE — NURSING NOTE
Chief Complaint   Patient presents with     Follow Up     May 2, 2022- Dr. Valdes: return for follow up     Vitals were taken and medications reconciled.    Freddie Hernandes, EMT  3:01 PM

## 2022-05-02 NOTE — PATIENT INSTRUCTIONS
"Cardiology Providers you saw during your visit:  Dr. Valdes    Medication changes:   Increase lasix dose to 40 mg daily for 3 days (5/4, 5/5 and 5/6). Then resume daily dose of 20 mg daily.     Follow up:   Labs in week   with Dr. Valdes in 6 months       Follow the American Heart Association Diet and Lifestyle recommendations:  Limit saturated fat, trans fat, sodium, red meat, sweets and sugar-sweetened beverages. If you choose to eat red meat, compare labels and select the leanest cuts available.  Aim for at least 150 minutes of moderate physical activity or 75 minutes of vigorous physical activity - or an equal combination of both - each week.    If you have any questions, contact  Lazaro Felton RN. We are encouraging the use of [place] CK1 to communicate with your HealthCare Provider     To contact the [facility], please call, [phone]  To schedule an appointment or to leave a message for your Care Team Press #1  If you are a physician calling for another physician Press #2  For Billing Press #3  For Medical Records Press #4\"    "

## 2022-05-03 ENCOUNTER — TELEPHONE (OUTPATIENT)
Dept: CARDIOLOGY | Facility: CLINIC | Age: 82
End: 2022-05-03
Payer: COMMERCIAL

## 2022-05-03 NOTE — TELEPHONE ENCOUNTER
Attempted to call the nurse from the assisted living, Hilario. She did not answer but another worker did. I spoke with her and requested that hilario call me back to discuss lasix dose increase.     Lazaro Felton RN   Cardiology Nurse Coordinator

## 2022-05-11 ENCOUNTER — LAB REQUISITION (OUTPATIENT)
Dept: LAB | Facility: CLINIC | Age: 82
End: 2022-05-11
Payer: COMMERCIAL

## 2022-05-11 DIAGNOSIS — I50.23 ACUTE ON CHRONIC SYSTOLIC (CONGESTIVE) HEART FAILURE (H): ICD-10-CM

## 2022-05-12 LAB
ANION GAP SERPL CALCULATED.3IONS-SCNC: 14 MMOL/L (ref 5–18)
BUN SERPL-MCNC: 31 MG/DL (ref 8–28)
CALCIUM SERPL-MCNC: 9.6 MG/DL (ref 8.5–10.5)
CHLORIDE BLD-SCNC: 105 MMOL/L (ref 98–107)
CO2 SERPL-SCNC: 20 MMOL/L (ref 22–31)
CREAT SERPL-MCNC: 1.97 MG/DL (ref 0.7–1.3)
GFR SERPL CREATININE-BSD FRML MDRD: 34 ML/MIN/1.73M2
GLUCOSE BLD-MCNC: 127 MG/DL (ref 70–125)
POTASSIUM BLD-SCNC: 4.2 MMOL/L (ref 3.5–5)
SODIUM SERPL-SCNC: 139 MMOL/L (ref 136–145)

## 2022-05-12 PROCEDURE — 36415 COLL VENOUS BLD VENIPUNCTURE: CPT | Mod: ORL | Performed by: INTERNAL MEDICINE

## 2022-05-12 PROCEDURE — P9604 ONE-WAY ALLOW PRORATED TRIP: HCPCS | Mod: ORL | Performed by: INTERNAL MEDICINE

## 2022-05-12 PROCEDURE — 80048 BASIC METABOLIC PNL TOTAL CA: CPT | Mod: ORL | Performed by: INTERNAL MEDICINE

## 2022-05-26 ENCOUNTER — TELEPHONE (OUTPATIENT)
Dept: OTHER | Facility: CLINIC | Age: 82
End: 2022-05-26
Payer: COMMERCIAL

## 2022-05-26 NOTE — TELEPHONE ENCOUNTER
Spoke with patient's sister.  She will schedule the two ultrasounds at [place] and call back to schedule the follow up with Dr. Vick.

## 2022-06-01 ENCOUNTER — MYC MEDICAL ADVICE (OUTPATIENT)
Dept: CARDIOLOGY | Facility: CLINIC | Age: 82
End: 2022-06-01
Payer: COMMERCIAL

## 2022-06-14 ENCOUNTER — TRANSFERRED RECORDS (OUTPATIENT)
Dept: VASCULAR ULTRASOUND | Facility: CLINIC | Age: 82
End: 2022-06-14

## 2022-06-29 ENCOUNTER — ANCILLARY PROCEDURE (OUTPATIENT)
Dept: VASCULAR ULTRASOUND | Facility: CLINIC | Age: 82
End: 2022-06-29
Attending: SURGERY
Payer: COMMERCIAL

## 2022-06-29 ENCOUNTER — TRANSFERRED RECORDS (OUTPATIENT)
Dept: VASCULAR ULTRASOUND | Facility: CLINIC | Age: 82
End: 2022-06-29

## 2022-06-29 DIAGNOSIS — Z95.828 HISTORY OF ARTERIAL BYPASS OF LOWER EXTREMITY: ICD-10-CM

## 2022-06-29 DIAGNOSIS — I73.9 PAD (PERIPHERAL ARTERY DISEASE) (H): ICD-10-CM

## 2022-06-29 PROCEDURE — 93922 UPR/L XTREMITY ART 2 LEVELS: CPT

## 2022-06-29 PROCEDURE — 93926 LOWER EXTREMITY STUDY: CPT | Mod: LT

## 2022-07-01 ENCOUNTER — OFFICE VISIT (OUTPATIENT)
Dept: OTHER | Facility: CLINIC | Age: 82
End: 2022-07-01
Attending: SURGERY
Payer: COMMERCIAL

## 2022-07-01 VITALS — DIASTOLIC BLOOD PRESSURE: 61 MMHG | SYSTOLIC BLOOD PRESSURE: 137 MMHG | OXYGEN SATURATION: 98 % | HEART RATE: 74 BPM

## 2022-07-01 DIAGNOSIS — Z89.511 STATUS POST BELOW KNEE AMPUTATION, RIGHT (H): ICD-10-CM

## 2022-07-01 DIAGNOSIS — R09.89 OTHER SPECIFIED SYMPTOMS AND SIGNS INVOLVING THE CIRCULATORY AND RESPIRATORY SYSTEMS: ICD-10-CM

## 2022-07-01 DIAGNOSIS — Z95.828 HISTORY OF ARTERIAL BYPASS OF LOWER EXTREMITY: Primary | ICD-10-CM

## 2022-07-01 PROCEDURE — G0463 HOSPITAL OUTPT CLINIC VISIT: HCPCS

## 2022-07-01 PROCEDURE — 99213 OFFICE O/P EST LOW 20 MIN: CPT | Performed by: SURGERY

## 2022-07-01 NOTE — PROGRESS NOTES
Patient is here to discuss follow up.    /61 (BP Location: Left arm, Patient Position: Chair, Cuff Size: Adult Large)   Pulse 74   SpO2 98%     Questions patient would like addressed today are: N/A.    Refills are needed: No    Has homecare services and agency name:  Yes: [facility].     Maxwell Thompson

## 2022-07-01 NOTE — PROGRESS NOTES
Tad Manning is a 81-year-old diabetic who is status post a left above-knee popliteal to below-knee popliteal bypass in 2014 utilizing reverse greater saphenous vein.  He required angioplasty of a stenosis of the native vessel just proximal to the bypass in mid 2014.  This has continued to do well.  He is also status post a right below-knee amputation performed by me in 2018.  He presents today for annual lower extremity noninvasive follow-up.    He has moved into an assisted living facility.  At today's visit he is in good spirits.  He has no left leg complaints.    Exam:  Well-developed male alert and oriented x3.  Blood pressure 137/61 with a pulse of 74.  Right below-knee prosthesis appears to be working well.  Left foot is warm and pink.  Biphasic left dorsalis pedis Doppler signal with monophasic left posterior tibial Doppler signal.    Imaging:  Patent left leg bypass with elevation in the mid left SFA suggestive of a moderate stenosis in the native vessel.  Left-sided toe pressure of 56 mmHg with a TBI of 0.37.    ASSESSMENT:  1.  8 years status post left above-knee popliteal to below-knee popliteal bypass utilizing reverse GSV clinically doing well.  Moderate 50-75% stenosis of the native mid SFA.  He does not ambulate enough to claudicate.  I do not feel that his graft is threatened.    2.  4 years status post right below-knee amputation clinically doing well.    RECOMMENDATION:  I reviewed all the above with Hima.  I will continue to observe the stenosis of his native left SFA.  He will continue his medical regimen including Eliquis and aspirin.  Vascular surgical follow-up will be with me in 1 year for a repeat left leg graft ultrasound and a left leg WALLACE with TBI.  I will also check a bilateral carotid ultrasound.    Total length of this encounter was 20 minutes.    Solis Vick MD

## 2022-07-05 NOTE — PROGRESS NOTES
It was a pleasure to see Tad Manning today in Neurosurgery Clinic. He is a 77 year old male who underwent:     DATE OF OPERATION:  02/09/2017       PREOPERATIVE DIAGNOSIS:  Epidural abscess, osteomyelitis.       POSTOPERATIVE DIAGNOSIS:  Epidural abscess, osteomyelitis.       PROCEDURES PERFORMED:   1.  T9-T10 laminectomy.   2.  T9 transpedicular left-sided transpedicular disk space abscess evacuation.       SURGEON:  Marcelo Trent MD.       :  Ileana Floyd MD       PREOPERATIVE DIAGNOSIS:    1.  Thoracic spine instability.   2.  Osteomyelitis and discitis T9-T10 s/p laminectomy.       POSTOPERATIVE DIAGNOSIS:    1.  Thoracic spine instability.   2.  Osteomyelitis and discitis T9-T10 s/p laminectomy.           PROCEDURES PERFORMED:   1.  T7-T12 posterior segmental instrumentation.   2.  Transpedicular, transforaminal right-sided T9-T10 interbody fusion.   3.  Posterior arthrodesis T7-T12.  4.  Use of intraoperative neuro-navigation.    5.  Use of intraoperative O-arm.   6.  Use of intraoperative cell saver.     7.  Use of allograft bone.       ATTENDING SURGEON:  Marcelo Trent MD       ASSISTANT:  Kunal Ramos MD     Doing well. Some back stiffness and numbness on R flank.    Vitals:    04/16/18 1041   BP: 104/65   BP Location: Right arm   Patient Position: Chair   Cuff Size: Adult Regular   Pulse: 73   Weight: 91.2 kg (201 lb)   Height: 1.829 m (6')     Body mass index is 27.26 kg/(m^2).  No Pain (0)    Awake. Alert. Incision well healed.  Strength 5/5 BLE.    Imaging: CT shows stable alignment of hardware. Solid fusion across T9-10. The imaging was shown to the patient and reviewed in clinic.     Assessment: S/P thoracic fusion for osteomyelitis/discitis.    Plan: RTC PRN.   
- - -

## 2022-07-29 NOTE — TELEPHONE ENCOUNTER
[facility] Call Center    Phone Message    May a detailed message be left on voicemail: yes     Reason for Call: Medication Refill Request    Has the patient contacted the pharmacy for the refill? Yes   Name of medication being requested: Furosimide  Provider who prescribed the medication: Abebe  [facility]: [facility] - [address]    Pharmacy states they received a refill in 4/2022 but that refill request was not valid.  Date medication is needed: 07/29/2022         Action Taken: Other: routed to cardiology    Travel Screening: Not Applicable

## 2022-07-29 NOTE — TELEPHONE ENCOUNTER
Last Clinic Visit: 5/2/2022  [facility] [place]  [place] 8/31/22  Creatinine elevated  Lab Test 05/12/22  1118   CR 1.97*     Lab note:  Dear Hima,      Here are your recent results which are within the expected range. Please continue with your current plan of care and let us know if you have any questions or concerns.     Regards,  Lazaro Collado RN   Cardiology Nurse Coordinator - Dr. Valdes

## 2022-08-09 NOTE — PROGRESS NOTES
Chief Complaint:    Chief Complaint   Patient presents with     Follow Up For     Return CORE; 76yr old male wih a h/o CAD, Aflutter and chronic systolic HF secondary to ICM presenting for HF follow-up with labs prior.         HPI:HISTORY OF PRESENT ILLNESS:  Mr. Tad Manning is a 74-year-old gentleman with a history of coronary artery disease, ischemic cardiomyopathy, HFpEF, hypertension, hyperlipidemia, atrial flutter, diabetes mellitus, anemia, status post left popliteal bypass in 01/2014, had a second toe amputation at the same time.  He has been at the [facility] since February of this year. He returns to clinic today with his brother.  His brother states that he did get dressed by himself this morning.  He also mentioned that he can use a walker with assistance of a person on both sides of him.      I saw him in clinic on 05/11. I did not make any medication changes and asked him to return to clinic today.  He has not had any other heart clinic appointments or hospitalizations since that time.  He returns to clinic today with his brother for routine followup.     He had a paraspinal abscess and a surgical decompression with IV antibiotic therapy in 02/2017.  A KASSIE at that time showed a left ventricular ejection fraction 25%-30%.  He was also seen by Dr. Valdes in our clinic 05/01/2017 started on furosemide p.o. every day.  He was seen by Dr. Max on 05/25, had a chest x-ray that revealed a left pleural effusion, unchanged.      REVIEW OF SYSTEMS:  Overall, he states that he is feeling well.  He continues to gain strength and endurance.  He is planning to stay at the [facility] until at least 06/28 and then possibly go back to his own apartment.  He has not been able to stand since last November.  He gets weighed in his wheelchair on a daily basis.  His weight today at the TCU is 187.8.  He denies any shortness of breath at rest or with activity.  Denies PND.  He uses 2 pillows when he is  sleeping.  Sleeping pattern is good and bad nights.  He states it has been improving.  He denies any chest pain, angina, use of nitroglycerin, lightheaded, dizziness, fainting, falling, ankle edema, nausea or vomiting.  He continues to wear LUDIVINA hose.  He has OT, PT on a daily basis.        HABITS:  He denies the use of tobacco, denies use of alcohol.  The staff at [facility] check his blood sugar 3 times per day.     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)      Coronary artery disease     triple bypass 1995     Diabetes mellitus (H)      Diabetic polyneuropathy (H)      History of blood transfusion      Hyperlipidaemia      Hypertension      Malignant neoplasm (H)     CLL     Noninflammatory pericardial effusion      Osteomyelitis of left foot (H)      PVD (peripheral vascular disease) (H)      Sebaceous cyst        PAST SURGICAL HISTORY  Past Surgical History:   Procedure Laterality Date     AMPUTATE TOE(S)  1/10/2014    Procedure: AMPUTATE TOE(S);;  Surgeon: Amador Vick MD;  Location:  OR     BONE MARROW BIOPSY, BONE SPECIMEN, NEEDLE/TROCAR  10/19/2012    Procedure: BIOPSY BONE MARROW;  BONE MARROW BIOPSY  (CONSCIOUS SED);  Surgeon: Ramon Quesada MD;  Location:  GI     BYPASS GRAFT FEMOROPOPLITEAL  1/10/2014    Procedure: BYPASS GRAFT FEMOROPOPLITEAL;  Left above knee popliteal to  Below knee popliteal bypass using transverse saphenous vein graft. Left 2nd Toe amputation;  Surgeon: Amador Vick MD;  Location:  OR     CARDIAC SURGERY      angioplasty with stent, triple bypass     EXCISE CYST GENERIC (LOCATION) N/A 2/1/2016    Procedure: EXCISE CYST GENERIC (LOCATION);  Surgeon: Amador Vick MD;  Location:  SD     GRAFT VEIN FROM EXTREMITY  (LOCATION)  1/10/2014    Procedure: GRAFT VEIN FROM EXTREMITY (LOCATION);;  Surgeon: Amador Vick MD;  Location: [facility] OR     LAMINECTOMY THORACIC ONE LEVEL N/A 2/8/2017    Procedure: LAMINECTOMY THORACIC ONE LEVEL;  Surgeon: Marcelo Trent MD;  Location: [facility] OR     OPTICAL TRACKING SYSTEM FUSION POSTERIOR SPINE THORACIC THREE+ LEVELS N/A 2/12/2017    Procedure: OPTICAL TRACKING SYSTEM FUSION POSTERIOR SPINE THORACIC THREE+ LEVELS;  Surgeon: Marcelo Trent MD;  Location: [facility] OR     TONSILLECTOMY       VASCULAR SURGERY      ptcr legs         FAMILY HISTORY:  Family History   Problem Relation Age of Onset     CANCER Mother      leukemia         SOCIAL HISTORY:  Social History     Social History     Marital status: Single     Spouse name: N/A     Number of children: N/A     Years of education: N/A     Social History Main Topics     Smoking status: Former Smoker     Packs/day: 2.00     Years: 30.00     Types: Cigarettes     Quit date: 6/19/1995     Smokeless tobacco: Never Used     Alcohol use Yes      Comment: 1 drink monthly     Drug use: No     Sexual activity: Not Asked     Other Topics Concern     None     Social History Narrative    Lives independently with SO. 2/9/2017        ALLERGIES  Allergies   Allergen Reactions     Blood Transfusion Related (Informational Only) Other (See Comments)     Patient has a history of a clinically significant antibody against RBC antigens.  A delay in compatible RBCs may occur.          CURRENT MEDICATIONS:    Current Outpatient Prescriptions on File Prior to Visit:  furosemide (LASIX) 40 MG tablet Take 40 mg by mouth daily If weight goes up 2.5 lbs, pt has to take another pill in the PM.   nystatin (MYCOSTATIN) cream Apply topically 2 times daily   acetaminophen (TYLENOL) 325 MG tablet Take 2 tablets (650 mg) by mouth every 4 hours as needed for other (surgical pain)   aspirin 325 MG tablet Take 1 tablet (325 mg) by mouth daily   atorvastatin (LIPITOR) 40 MG tablet  Take 1 tablet (40 mg) by mouth daily   clopidogrel (PLAVIX) 75 MG tablet Take 1 tablet (75 mg) by mouth daily   insulin glargine (LANTUS) 100 UNIT/ML injection Inject 22 Units Subcutaneous every morning (before breakfast) (Patient taking differently: Inject 16 Units Subcutaneous every morning (before breakfast) )   lisinopril (PRINIVIL/ZESTRIL) 10 MG tablet Take 1 tablet (10 mg) by mouth daily (Patient taking differently: Take 5 mg by mouth daily )   melatonin 3 MG tablet Take 1 tablet (3 mg) by mouth nightly as needed   metFORMIN (GLUCOPHAGE) 500 MG tablet Take 1 tablet (500 mg) by mouth 2 times daily (with meals)   metoprolol (LOPRESSOR) 25 MG tablet Take 3 tablets (75 mg) by mouth 2 times daily   polyethylene glycol (MIRALAX/GLYCOLAX) Packet Take 17 g by mouth daily as needed for constipation   ranitidine (ZANTAC) 150 MG tablet Take 1 tablet (150 mg) by mouth 2 times daily   senna-docusate (SENOKOT-S;PERICOLACE) 8.6-50 MG per tablet Take 1-2 tablets by mouth 2 times daily as needed (constipation )   benzonatate (TESSALON) 100 MG capsule Take 1 capsule (100 mg) by mouth 3 times daily as needed for cough   cholecalciferol 1000 UNITS TABS Take 1,000 Units by mouth daily   tamsulosin (FLOMAX) 0.4 MG capsule Take 2 capsules (0.8 mg) by mouth At Bedtime   nitroglycerin (NITROSTAT) 0.4 MG sublingual tablet For chest pain place 1 tablet under the tongue every 5 minutes for 3 doses. If symptoms persist 5 minutes after 1st dose call 911.   order for DME Equipment being ordered: [facility] shoe     No current facility-administered medications on file prior to visit.     ROS:   Constitutional: No fever, chills, or sweats. No weight gain/loss.   ENT: No visual disturbance, ear ache, epistaxis, sore throat.   Allergies/Immunologic: Negative.   Respiratory: No cough, hemoptysis.   Cardiovascular: As per HPI.   GI: No nausea, vomiting, hematemesis, melena  : No urinary frequency, dysuria, or hematuria.   Integument: Negative.    Psychiatric: Negative.   Neuro: Negative.   Endocrinology: Negative.   Musculoskeletal: Negative.    EXAM:  BP 97/48 (BP Location: Left arm, Patient Position: Chair, Cuff Size: Adult Regular)  Pulse 92  Ht 1.829 m (6')  Wt 87.6 kg (193 lb 3.2 oz)  SpO2 100%  BMI 26.2 kg/m2  Home weight  General: Elderly man in NAD. appears comfortable, alert and articulate, sitting in WC  Head: normocephalic, atraumatic  Eyes: anicteric sclera, EOMI  Neck: no adenopathy, no carotid bruits noted bilaterally  Orophyarynx: moist mucosa, no lesions, dentition intact  Heart: regular, S1/S2, no murmur, gallop, rub, estimated JVP normal (Examined in WC due to inability to stand)  Lungs: bilaterally clear, without rales, rhonchi or wheezes bilaterally  Abdomen: difficult to examine in WC, soft, non-tender, bowel sounds present, no hepatomegaly  Extremities: no clubbing, cyanosis or edema, wearing sophy hose  Neurological: normal speech and affect, no gross motor deficits  Skin:  No rashes or lesions noted      Labs:  CBC RESULTS:  Lab Results   Component Value Date    WBC 6.7 05/25/2017    RBC 3.97 (L) 05/25/2017    HGB 11.0 (L) 05/25/2017    HCT 36.5 (L) 05/25/2017    MCV 92 05/25/2017    MCH 27.7 05/25/2017    MCHC 30.1 (L) 05/25/2017    RDW 19.0 (H) 05/25/2017     05/25/2017       CMP RESULTS:  Lab Results   Component Value Date     06/08/2017    POTASSIUM 3.8 06/08/2017    CHLORIDE 100 06/08/2017    CO2 29 06/08/2017    ANIONGAP 9 06/08/2017     (H) 06/08/2017    BUN 17 06/08/2017    CR 0.76 06/08/2017    GFRESTIMATED >90  Non  GFR Calc   06/08/2017    GFRESTBLACK >90   GFR Calc   06/08/2017    JUSTINO 8.6 06/08/2017    BILITOTAL 0.2 04/06/2017    ALBUMIN 2.4 (L) 04/06/2017    ALKPHOS 154 (H) 04/06/2017    ALT 17 04/06/2017    AST 10 04/06/2017      Lab Results   Component Value Date    A1C 6.1 (H) 02/10/2017     Lab Results   Component Value Date    CHOL 134 05/23/2014     Lab  Results   Component Value Date    HDL 34 2014     Lab Results   Component Value Date    LDL 48 2014     Lab Results   Component Value Date    TRIG 257 2014     Lab Results   Component Value Date    CHOLHDLRATIO 3.9 2014       INR RESULTS:  Lab Results   Component Value Date    INR 1.22 (H) 2017       No components found for: CK  Lab Results   Component Value Date    MAG 1.4 (L) 2017     Lab Results   Component Value Date    NTBNPI 72323 (H) 2017       Most recent echocardiogram:  Recent Results (from the past 8760 hour(s))   Echo limited with definity    Narrative    [id number]  [id number]  [id number]  [id number]^LUIGI^YANELY^           [facility],[place]  Echocardiography Laboratory  [address] [place] [id number]     Name: PETER BUTLER  MRN: [id number]  : 1940  Study Date: 2017 12:24 PM  Age: 76 yrs  Gender: Male  Patient Location: [facility]  Reason For Study: Arrhythmia  Ordering Physician: YANELY MEYERS  Performed By: DO Austin     BSA: 2.1 m2  Height: 72 in  Weight: 190 lb  BP: 128/64 mmHg  _____________________________________________________________________________  __        Procedure  Complete Portable Echo Adult. Contrast Optison. Technically difficult study.  Optison (NDC #7315-9337-29) given intravenously. Patient was given 6 ml  mixture of 3 ml Optison and 6 ml saline. 3 ml wasted. IV start location R  Upper arm . The patient's rhythm is atrial fibrillation.  _____________________________________________________________________________  __        Interpretation Summary  Technically difficult study.The patient's rhythm is atrial fibrillation.  The left ventricle is severely dilated. Left ventricular systolic function is  severely reduced, ejection fraction is estimated at 25-30%. There is severe  global hypokinesis.  Right ventricular systolic function is mildly reduced.  The right ventricular systolic  pressure is approximated at 15 mmHg plus the  right atrial pressure. IVC is plethoric and estimated mean RA pressure is 15  mmHg.  No pericardial effusion present.  _____________________________________________________________________________  __     _____________________________________________________________________________  __     MMode/2D Measurements & Calculations     EF(MOD-bp): 30.0 %        Doppler Measurements & Calculations  TR max alessia: 190.0 cm/sec  TR max P.4 mmHg           _____________________________________________________________________________  __           Report approved by: Edward Snyder 2017 05:09 PM      ECHO COMPLETE WITH OPTISON    Narrative    [id number]  [id number]  [id number]  [id number]^ELOISE^CHARLIE^VALARIE           [facility]  Echocardiography Laboratory  [address] [place] [address]        Name: PETER BUTLER  MRN: [id number]  : 1940  Study Date: 2017 12:38 PM  Age: 76 yrs  Gender: Male  Patient Location: [facility]^[id number]^  Reason For Study: Syncope  Ordering Physician: CHARLIE NAVAS  Referring Physician: CHARLIE NAVAS  Performed By: Liana Lamb     BSA: 2.0 m2  Height: 72 in  Weight: 180 lb  HR: 80  BP: 104/50 mmHg  _____________________________________________________________________________  __        Procedure  Complete Portable Echo Adult. Contrast Optison.  _____________________________________________________________________________  __        Interpretation Summary     The left ventricle is mildly dilated at 6.0 cm. Left ventricular systolic  function is mild to moderately reduced as the visual ejection fraction is  estimated at 40-45%.  The biplane calculated LVEF = 43%. This decrease in the LVEF is due to mild  global hypokinesia and moderate to severe inferolateral wall hypokinesis.  Grade I or early diastolic dysfunction is noted  There is trace tricuspid regurgitation. The right ventricular  systolic  pressure is normal approximated at 16mmHg plus the right atrial pressure.  The left atrium is moderately dilated by volume criteria at 41.6 ml/m2.  There is mild trileaflet aortic sclerosis but no aortic stenosis or  regurgitation is present.  _____________________________________________________________________________  __        Left Ventricle  The left ventricle is mildly dilated. at 6.0 cm. There is normal left  ventricular wall thickness. Grade I or early diastolic dysfunction. E by E  prime ratio is less than 8, that likely suggests normal left ventricular  filling pressures. The visual ejection fraction is estimated at 40-45%. The  biplane calculated LVEF = 43%. Left ventricular systolic function is mild to  moderately reduced. There is mild global hypokinesia of the left ventricle.  There is moderate to severe inferolateral wall hypokinesis. There is no  thrombus seen in the left ventricle.     Right Ventricle  Normal right ventricle structure and size. The right ventricular systolic  function is mildly reduced.     Atria  The left atrium is moderately dilated. Left atrial enlargement is noted by  volume criteria. at 41.6 ml/m2. Right atrial size is normal. Intact atrial  septum.     Mitral Valve  There is mild to moderate mitral annular calcification. There is no evidence  of mitral valve prolapse. There is no mitral valve stenosis.        Tricuspid Valve  The tricuspid valve is normal in structure and function. Right ventricle  systolic pressure estimate normal. There is trace tricuspid regurgitation. The  right ventricular systolic pressure is approximated at 16.6 mmHg plus the  right atrial pressure. The right ventricular systolic pressure is approximated  at 16mmHg plus the right atrial pressure.     Aortic Valve  The aortic valve is normal in structure and function. There is mild trileaflet  aortic sclerosis. No aortic regurgitation is present. No aortic stenosis is  present.     Pulmonic  Valve  The pulmonic valve is not well seen, but is grossly normal. There is trace  pulmonic valvular regurgitation.     Vessels  Normal size aorta. The inferior vena cava is not dilated.     Pericardium  The pericardium appears normal. There is no pleural effusion.        Rhythm  The rhythm was normal sinus.  _____________________________________________________________________________  __  MMode/2D Measurements & Calculations  IVSd: 0.65 cm     LVIDd: 5.9 cm  LVIDs: 4.8 cm  LVPWd: 0.76 cm  FS: 18.2 %  EDV(Teich): 173.1 ml  ESV(Teich): 108.9 ml  LV mass(C)d: 154.2 grams  Ao root diam: 3.6 cm  LA dimension: 4.2 cm  asc Aorta Diam: 3.4 cm  LA/Ao: 1.2  LA Volume (BP): 84.8 ml  LA Volume Index (BP): 41.6 ml/m2  TAPSE: 1.0 cm           Doppler Measurements & Calculations  MV E max alessia: 39.1 cm/sec  MV A max alessia: 67.1 cm/sec  MV E/A: 0.58  MV dec time: 0.22 sec  TR max alessia: 203.6 cm/sec  TR max P.6 mmHg  Lateral E/e': 2.8  Medial E/e': 5.5           _____________________________________________________________________________  __           Report approved by: Edward Barry 2017 04:25 PM          Assessment  ASSESSMENT:   1.  Chronic systolic heart failure secondary to ischemic cardiomyopathy, decreased left ventricular function with most recent ejection fraction 25%-30%.  He is New York Heart Association class II.  He continues to be on 10 mg of lisinopril, metoprolol 75 mg p.o. b.i.d., potassium chloride 20 mEq p.o. every day, furosemide 40 mg every day.  He takes an additional 40 mg in the p.m. afternoon if his weight is up 3-5 pounds. SCD prophylaxis.  The patient does not have an ICD or defibrillator.  Fluid status:  Euvolemic.  NSAIDs:  Currently not using.  Sleep evaluation:  Not applicable.  Cardiac rehab:  He is attending OT, PT.   2.  Coronary artery disease, currently not complaining of chest pain.  He continues to be on Plavix.  States that he has 3 stents in his legs, 1 stent in his heart.  I  do not know if this is planned for long term or if there is an end date to Plavix.   3.  Hypertension.  Blood pressure is well controlled.  He does run a systolic blood pressure around 100 mmHg.   4.  Hyperlipidemia.  Continues to be on 40 mg of atorvastatin.   5.  Atrial flutter.  He continues to be on aspirin and metoprolol.  He is not on an anticoagulant.  His CHADS-VASc score is 4.  Warfarin has not been restarted due to his increased risk of falling and not being able to bear weight on his left lower extremity.   6.  Diabetes mellitus type 2.   7.  History of anemia.   8.  Peripheral arterial disease, status post left popliteal bypass complicated with osteomyelitis, and second toe amputation in 01/2014, renal insufficiency.      PLAN:   1.  Start spironolactone 12.5 mg p.o. every day. his BUN today is 17, creatitine 0.76  2.  Discontinue potassium due to starting aldosterone antagonist (spironolactone).   3.  Check a basic metabolic panel at TCU in 10-14 days to follow up on starting spironolactone.   4.  Return to clinic with Dr. Valdes as scheduled in approximately 6 weeks.   5.  [facility] weeks.   6.  As documented in prior progress notes, the patient should have an ischemic workup but due to his increased risk of falling and other comorbidities, this has been put on hold and he is being treated medically at this time until he is stronger and has less risk of falling.   7.  Labs were reviewed from today's clinic.      The patient appears to be much stronger than he was 4 weeks ago.        Thanks for allowing us to be involved in his care, feel free to call if questions or concerns.     Magnolia BERMUDEZ, APRN, CNP  Clinical Nurse Specialist  [facility]/Advanced Heart Failure/Mechanical Circulatory Support  Pager          Resident

## 2022-08-30 NOTE — PROGRESS NOTES
In person appointment    HPI:   Mr. Manning is a 81 year old male with a past medical history including CLL (ongoing treatment), CAD s/p CABG in 1995, HFrEF 2/2 ICM, atrial flutter, PVD s/p iliac artery stents and left lower extremity bypass, hypertension, diabetes, hyperlipidemia, prosthesis on leg for about 2-3 years now. discitis and multple admission for decompensated heart failure. Presents to clinic for CORE f/u.    He last saw Dr Valdes 5/2/22. He was volume up and his lasix was increased.    He is feeling good.. No SOB at rest. Walking into clinic gave him just mild ALEXIS, feels like this is about stable. LE edema in left leg is mild. Prosthesis on r leg. No abdominal edema. He denies orthopnea, PND. He has some lightheadedness when he first wakes up and gets up out of bed, he is careful to go slow. No dizziness. No syncope. No palpitations. No chest pain. Appetite is good.    His weight here is down compared to may, but his weight at his assisted living has gone up slightly: 6/1 was 189.3, 7/22 was 185.4, 194 on 8/1/22. We don't have any weights that are more recent.    Cardiac Medications  Eliquis 5 mg BID  ASA 81 mg daily  Entresto  mg BID  Metoprolol succinate 150 mg daily  Jardiance 10 mg daily  Lasix 20 mg daily with an extra 20 mg as need for weight over 195 lbs  Aldactone 25 mg daily  Digoxin 125 mcg daily  Atorvastatin 20 mg daily    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)       Chronic systolic heart failure (H) 2017     Confusion 2021     Coronary artery disease     triple bypass      CRF (chronic renal failure), stage 3 (moderate) (H) 2017     Critical lower limb ischemia (H) 1/10/2014     Diabetes mellitus (H)      Diabetes mellitus, type 2 (H) 2014     Diabetic polyneuropathy (H)      Discitis 2017     Epidural abscess 2017     Fall 2017     History of blood transfusion      HTN (hypertension) 2014     Hyperglycemia 2020     Hyperlipidaemia      Hyperlipidemia with target LDL less than 100 2014    Formatting of this note might be different from the original. Overview:  Diagnosis updated by automated process. Provider to review and confirm. Formatting of this note might be different from the original. Diagnosis updated by automated process. Provider to review and confirm.     Hypertension      Long term current use of anticoagulant therapy 2018     Non-healing ulcer of foot (H)     Right     Noninflammatory pericardial effusion      Osteomyelitis (H) 2014     Osteomyelitis of foot, right, acute (H) 2/15/2017     Osteomyelitis of left foot (H)      PVD (peripheral vascular disease) (H)      Recurrent falls 2021     Sebaceous cyst        FAMILY HISTORY:  Family History   Problem Relation Age of Onset     Cancer Mother         leukemia     Leukemia Mother        SOCIAL HISTORY:  Socioeconomic History     Marital status: Single   Tobacco Use     Smoking status: Former Smoker     Packs/day: 2.00     Years: 30.00     Pack years: 60.00     Types: Cigarettes     Last attempt to quit: 1995     Years since quittin.5     Smokeless tobacco: Never Used   Other Topics Concern     Parent/sibling w/ CABG, MI or angioplasty before 65F 55M? Not Asked   Social History Narrative    Lives independently with SO. 2017        CURRENT MEDICATIONS:  acetaminophen (TYLENOL) 325 MG tablet, Take 650 mg by mouth every 4 hours as needed Max  3gm per day for minor pain/fever  alcohol swab prep pads, Use to swab area of injection/jina as directed.  apixaban ANTICOAGULANT (ELIQUIS ANTICOAGULANT) 5 MG tablet, Take 1 tablet (5 mg) by mouth 2 times daily  aspirin (ASA) 81 MG chewable tablet, Take 81 mg by mouth daily  atorvastatin (LIPITOR) 20 MG tablet, Take 1 tablet (20 mg) by mouth daily  Mahsa Protect (EUCERIN) external cream, Apply topically 2 times daily as needed for dry skin or other Apply as needed to intact stage 1 pressure area and/or irritated skin, rash or excoriation  bisacodyl (DULCOLAX) 10 MG suppository, Place 10 mg rectally daily as needed for constipation If no BM or small BM for 3 days/9 shifts  blood glucose (NO BRAND SPECIFIED) test strip, Use to test blood sugar 4 times daily or as directed.  blood glucose calibration (NO BRAND SPECIFIED) solution, 1 drop every 14 days Use 1 drop to calibrate blood glucose monitor every 14 days  blood glucose monitoring (NO BRAND SPECIFIED) meter device kit, Use to test blood sugar 4 times daily or as directed.  cholecalciferol 1000 UNITS TABS, Take 1,000 Units by mouth daily  citalopram (CELEXA) 20 MG tablet, Take 20 mg by mouth every morning   digoxin (LANOXIN) 125 MCG tablet, Take 1 tablet (125 mcg) by mouth daily  empagliflozin (JARDIANCE) 10 MG TABS tablet, Take 1 tablet (10 mg) by mouth daily  insulin glargine (LANTUS PEN) 100 UNIT/ML pen, Inject 50 Units Subcutaneous At Bedtime  insulin lispro (HUMALOG KWIKPEN) 100 UNIT/ML (1 unit dial) KWIKPEN, Inject 16 Units Subcutaneous 2 times daily (before meals) Breakfast and lunch  insulin lispro (HUMALOG KWIKPEN) 100 UNIT/ML (1 unit dial) KWIKPEN, Inject 18 Units Subcutaneous daily (with dinner)  magnesium hydroxide (MILK OF MAGNESIA) 400 MG/5ML suspension, Take 30 mLs by mouth daily as needed for constipation or heartburn If no BM/small BM for 3 days/9 shifts  metFORMIN (GLUCOPHAGE-XR) 750 MG 24 hr tablet, Take 750 mg by mouth every morning  metoprolol  "succinate ER (TOPROL-XL) 100 MG 24 hr tablet, Take 1.5 tablets (150 mg) by mouth daily  nitroglycerin (NITROSTAT) 0.4 MG sublingual tablet, For chest pain place 1 tablet under the tongue every 5 minutes for 3 doses. If symptoms persist 5 minutes after 1st dose call 911.  nystatin (MYCOSTATIN) 273898 UNIT/GM external powder, Apply topically 2 times daily as needed (to affected area(s) as needed)  order for DME, Equipment to be ordered:  Scale- Qty 1  Commander Flex - Qty. 1  Pulse-Oximeter - Qty. 1  Use as directed  order for DME, Equipment being ordered: [facility] shoe  pantoprazole (PROTONIX) 40 MG EC tablet, Take 40 mg by mouth daily  sacubitril-valsartan (ENTRESTO)  MG per tablet, Take 1 tablet by mouth 2 times daily  spironolactone (ALDACTONE) 25 MG tablet, Take 1 tablet (25 mg) by mouth daily  thin (NO BRAND SPECIFIED) lancets, 1 each 4 times daily Use with lanceting device.  traZODone (DESYREL) 50 MG tablet, Take 50 mg by mouth At Bedtime  triamcinolone (KENALOG) 0.1 % external cream, Apply topically to right stump, left hand and right forearm twice daily for rash  ULTICARE SHORT 31G X 8 MM insulin pen needle, Inject 1 each Subcutaneous 4 times daily  venetoclax (VENCLEXTA) 100 MG tablet, Take 200 mg by mouth every morning  loperamide (IMODIUM A-D) 2 MG tablet, Take 2-4 mg by mouth 4 times daily as needed for diarrhea Take 4 mg after 1st loose stool, then take 1 tablet as needed after each loose stool  polyethylene glycol (MIRALAX) 17 GM/Dose powder, Take 17 g by mouth daily as needed (Patient not taking: Reported on 8/31/2022)    No current facility-administered medications on file prior to visit.      ROS:   See HPI    EXAM:   /85 (BP Location: Left arm, Patient Position: Sitting, Cuff Size: Adult Regular)   Pulse 72   Ht 1.791 m (5' 10.5\")   Wt 86.8 kg (191 lb 4.8 oz)   SpO2 98%   BMI 27.06 kg/m       GENERAL: Appears comfortable, in no acute distress.   HEENT: Eye symmetrical, no discharge or " icterus bilaterally. Mucous membranes moist and without lesions.  CV: RRR, +S1S2, no murmur, rub, or gallop. JVP ~6   RESPIRATORY: Respirations regular, even, and unlabored. Lungs CTA throughout.   GI: Soft and non distended   EXTREMITIES: AKA of the right leg. Left leg with trace to 1+ edema. Left leg is warm and well perfused.  NEUROLOGIC: Alert and interacting appropriately. No focal deficits.   MUSCULOSKELETAL: No joint swelling or tenderness.   SKIN: No jaundice. No rashes or lesions.     Labs, reviewed with patient in clinic today:  CBC RESULTS:  Lab Results   Component Value Date    WBC 3.0 (L) 09/25/2021    WBC 3.7 (L) 06/26/2021    RBC 3.40 (L) 09/25/2021    RBC 3.09 (L) 06/26/2021    HGB 10.4 (L) 09/25/2021    HGB 9.6 (L) 06/26/2021    HCT 32.2 (L) 09/25/2021    HCT 28.6 (L) 06/26/2021    MCV 95 09/25/2021    MCV 93 06/26/2021    MCH 30.6 09/25/2021    MCH 31.1 06/26/2021    MCHC 32.3 09/25/2021    MCHC 33.6 06/26/2021    RDW 16.9 (H) 09/25/2021    RDW 14.6 06/26/2021    PLT 99 (L) 09/25/2021     (L) 06/26/2021       CMP RESULTS:  Lab Results   Component Value Date     08/31/2022     06/28/2021    POTASSIUM 4.3 08/31/2022    POTASSIUM 4.2 05/12/2022    POTASSIUM 4.1 06/28/2021    CHLORIDE 106 08/31/2022    CHLORIDE 105 05/12/2022    CHLORIDE 101 12/30/2021    CHLORIDE 109 06/28/2021    CO2 20 (L) 08/31/2022    CO2 20 (L) 05/12/2022    CO2 24 06/28/2021    ANIONGAP 14 08/31/2022    ANIONGAP 14 05/12/2022    ANIONGAP 4 06/28/2021     (H) 08/31/2022     (H) 05/12/2022     (H) 06/28/2021    BUN 28.9 (H) 08/31/2022    BUN 31 (H) 05/12/2022    BUN 43 (H) 06/28/2021    CR 1.75 (H) 08/31/2022    CR 1.33 (H) 06/28/2021    GFRESTIMATED 39 (L) 08/31/2022    GFRESTIMATED >60 07/08/2021    GFRESTIMATED 51 (L) 06/28/2021    GFRESTBLACK >60 07/08/2021    GFRESTBLACK 59 (L) 06/28/2021    JUSTINO 9.3 08/31/2022    JUSTINO 9.4 06/28/2021    BILITOTAL 0.8 09/23/2021    BILITOTAL 0.9 06/20/2021     ALBUMIN 3.0 (L) 09/23/2021    ALBUMIN 3.3 (L) 06/20/2021    ALKPHOS 84 09/23/2021    ALKPHOS 72 06/20/2021    ALT 12 09/23/2021    ALT 24 06/20/2021    AST 12 09/23/2021    AST 34 06/20/2021        INR RESULTS:  Lab Results   Component Value Date    INR 1.05 04/16/2021       Lab Results   Component Value Date    MAG 1.8 09/25/2021    MAG 2.0 06/23/2021     Lab Results   Component Value Date    NTBNPI 7,364 (H) 06/20/2021     Lab Results   Component Value Date    NTBNP 2,136 (H) 04/14/2022    NTBNP 22,172 (H) 02/09/2018       Diagnostics:     6/22/21 Coronary angiogram    Dist LAD lesion is 40% stenosed.    Mid LM to Prox LAD lesion is 90% stenosed.    Mid LAD lesion is 100% stenosed.    Ost Cx to Dist Cx lesion is 100% stenosed.    Prox Graft lesion is 70% stenosed.    1st Diag lesion is 80% stenosed.    Lat 1st Diag lesion is 90% stenosed.     1.  Severe multivessel CAD s/p CABG.                LM has severe stenosis.                LAD has sever proximal stenosis and midLAD is occluded. Distal LAD fills via patent LIMA.                LCx is occluded.                 RCA has moderate diffuse disease.  2.  LIMA to LAD is patent.  Mid to distal LAD stents are patent.  3.  SVG to OM is patent.  4.  SVG to Diagonal is patent with a 70% stenosis and supplies diagonal branches are small and diffusely diseased.    Diseased diagonal graft supplies very severely diffusely diseased diagonal branches.  PCI of the vein graft is unlikely to provide much benefit and there is significant risk of acute occlusion due to poor runoff with severe disease on the native diagonal branch vessels which are too small for PCI.    Recommend medical management.  This was discussed with referring cardiologist.      TTE 6/20/21  1. Left ventricular systolic function is severely reduced. The visual ejection  fraction is estimated at 20-25%.  2. The right ventricle is normal in structure, function and size.  3. The left atrium is moderately  dilated.  4. The aortic valve is trileaflet with aortic valve sclerosis.  5. There is mild (1+) mitral regurgitation.  ______________________________________________________________________________      TTE 10/5/18  The visual ejection fraction is estimated at 35-40%.  There is mild-moderate global hypokinesia of the left ventricle.  Mildly decreased right ventricular systolic function  In comparison with the previous study the LVEF has improved slightly.    Assessment and Plan:   Mr. Manning is a 81 year old male with a past medical history including CLL in remission, CAD s/p CABG in 1995, HFrEF 2/2 ICM, atrial flutter, PVD s/p iliac artery stents and left lower extremity bypass, hypertension, diabetes, hyperlipidemia, prosthesis on leg, discitis. Presents to clinic for CORE f/u.    We got a repeat ECHO today after titrating patient's medications. We did see some improvement with EF improving from 20-25% to 30-35%. We did discuss an ICD today and he is potentially interested. His CLL is stable. No other life limiting diagnosis that he or we aer aware of. I have referred him to EP to discuss ICD vs CRT-D.    # Chronic systolic heart failure/HFrEF secondary to ICM  (Ef 20-25% now improved to 30-35%)  Stage C. NYHA Class IIIA  Fluid status: euvolemic, can continue 20 mg daily. Change is PRN dosing to taking 20 mg extra lasix on days his weight is above 193 lbs.  ACEi/ARB/ARNI: Entresto 97/103 BID -max dose acheived  BB: metoprolol XL to 175 mg daily, ongoing titration  Aldosterone antagonist: Aldactone 25 mg  SGLT2i- Jardiance 10 mg daily  SCD prophylaxis: EF improved, but still below 35%, will refer to EP for consideration of ICD vs CRT-D (wide qrs, but no LBBB)  NSAID use: contraindicated  Sleep apnea evaluation: not discussed today  Remote monitoring: consider at future visits, although now lives at an assisted living where he has more supervision    # CAD s/p CABG  # PAD s/p iliac stenting and LE bypass  - No  anginal symptoms, he is on eliquis and statin (defer high potency to cardiologist); he has stable claudication    # Atrial flutter  - He is on BB and elquis    # CKD stage 3b  - Cr stable at his baseline at 1.75 toady    # T2DM  - Managed by Dr. Stahl    # CLL, on oral therapy, done with infusions at this point.    Follow-up  - Phone call in 2 weeks for Hrs- consider increasing metoprolol  - CORE clinic in 2 months  - EP in 2 months to discuss ICD     Billing  - I managed 2+ stable chronic conditions  - I changed a prescription medication      Carly Lomas PA-C  [facility]  [facility]  [place]

## 2022-08-31 NOTE — TELEPHONE ENCOUNTER
Called [facility]  Also adding after clinic visit an increase in Metoprolol.     Date: 8/31/2022    Time of Call: 1:44 PM     Diagnosis:  HF     [ VORB ] Ordering provider: Deb JENNINGS  Order: Increase Toprol to 175 mg daily.      Order received by: Verónica Jarquin RN      Follow-up/additional notes: Tried to call [facility] to discuss increase. Left detailed message and also left message at nurses station. Orders faxed. Verónica Jarquin RN

## 2022-08-31 NOTE — PATIENT INSTRUCTIONS
Take your medicines every day, as directed    Changes made today:  Increase weights to at least weekly    Continue lasix 20 mg daily. Should take an extra 20 mg of lasix if wieght is over 193 lbs. Call if you are using more than twice in a week   Monitor Your Weight and Symptoms    Contact us if you:    Gain 2 pounds in one day or 5 pounds in one week  Feel more short of breath  Notice more leg swelling  Feel lightheadeded   Change your lifestyle    Limit Salt or Sodium:  2000 mg  Limit Fluids:  2000 mL or approximately 64 ounces  Eat a Heart Healthy Diet  Low in saturated fats  Stay Active:  Aim to move at least 150 minutes every  week         To Contact us    During Business Hours:  [phone], option # 1      After hours, weekends or holidays:   [phone], Option #4  Ask to speak to the On-Call Cardiologist. Inform them you are a CORE/heart failure patient at the [facility].     Use [employer] allows you to communicate directly with your heart team through secure messaging.  [employer] can be accessed any time on your phone, computer, or tablet.  If you need assistance, we'd be happy to help!         Keep your Heart Appointments:    - CORE and Electrophysiology (to talk about an ICD) in 2 months- please schedule these today

## 2022-08-31 NOTE — NURSING NOTE
Diet: Patient instructed regarding a heart healthy diet, including discussion of reduced fat and sodium intake. Patient demonstrated understanding of this information and agreed to call with further questions or concerns.  Labs: Patient was given results of the laboratory testing obtained today. . Patient demonstrated understanding of this information and agreed to call with further questions or concerns.   Return Appointment: Patient given instructions regarding scheduling next clinic visit. Patient demonstrated understanding of this information and agreed to call with further questions or concerns.  Medication Change: Patient was educated regarding prescribed medication change, including discussion of the indication, administration, side effects, and when to report to MD or RN. Patient demonstrated understanding of this information and agreed to call with further questions or concerns.  Weight paramaters changed for taking extra 20 mg Lasix from 195 to 193. Updated orders faxed to Assisted Living.   Patient stated he understood all health information given and agreed to call with further questions or concerns.   Verónica Jarquin RN

## 2022-08-31 NOTE — LETTER
8/31/2022      RE: Tad Manning  [address] [place] [id number]       Dear Colleague,    Thank you for the opportunity to participate in the care of your patient, Tad Manning, at the [facility] [place] [facility]. Please see a copy of my visit note below.    In person appointment    HPI:   Mr. Manning is a 81 year old male with a past medical history including CLL (ongoing treatment), CAD s/p CABG in 1995, HFrEF 2/2 ICM, atrial flutter, PVD s/p iliac artery stents and left lower extremity bypass, hypertension, diabetes, hyperlipidemia, prosthesis on leg for about 2-3 years now. discitis and multple admission for decompensated heart failure. Presents to clinic for CORE f/u.    He last saw Dr Valdes 5/2/22. He was volume up and his lasix was increased.    He is feeling good.. No SOB at rest. Walking into clinic gave him just mild ALEXIS, feels like this is about stable. LE edema in left leg is mild. Prosthesis on r leg. No abdominal edema. He denies orthopnea, PND. He has some lightheadedness when he first wakes up and gets up out of bed, he is careful to go slow. No dizziness. No syncope. No palpitations. No chest pain. Appetite is good.    His weight here is down compared to may, but his weight at his assisted living has gone up slightly: 6/1 was 189.3, 7/22 was 185.4, 194 on 8/1/22. We don't have any weights that are more recent.    Cardiac Medications  Eliquis 5 mg BID  ASA 81 mg daily  Entresto  mg BID  Metoprolol succinate 150 mg daily  Jardiance 10 mg daily  Lasix 20 mg daily with an extra 20 mg as need for weight over 195 lbs  Aldactone 25 mg daily  Digoxin 125 mcg daily  Atorvastatin 20 mg daily    PAST MEDICAL HISTORY:  Past Medical History:   Diagnosis Date     ACS (acute coronary syndrome) (H) 6/20/2021     Acute respiratory failure with hypoxia (H) 4/6/2017     STORM (acute kidney injury) (H) 9/22/2018      Arthritis      ASCVD (arteriosclerotic cardiovascular disease)      Atrial fibrillation (H) 2/5/2018     Atrial flutter (H) 2/22/2017     BMI 30.0-30.9,adult      BPH (benign prostatic hypertrophy)      CAD S/P percutaneous coronary angioplasty 1/11/2014     Cellulitis      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)      Chronic systolic heart failure (H) 6/6/2017     Confusion 6/20/2021     Coronary artery disease     triple bypass 1995     CRF (chronic renal failure), stage 3 (moderate) (H) 1/20/2017     Critical lower limb ischemia (H) 1/10/2014     Diabetes mellitus (H)      Diabetes mellitus, type 2 (H) 1/11/2014     Diabetic polyneuropathy (H)      Discitis 2/8/2017     Epidural abscess 2/9/2017     Fall 2/7/2017     History of blood transfusion      HTN (hypertension) 1/11/2014     Hyperglycemia 1/9/2020     Hyperlipidaemia      Hyperlipidemia with target LDL less than 100 1/11/2014    Formatting of this note might be different from the original. Overview:  Diagnosis updated by automated process. Provider to review and confirm. Formatting of this note might be different from the original. Diagnosis updated by automated process. Provider to review and confirm.     Hypertension      Long term current use of anticoagulant therapy 2/5/2018     Non-healing ulcer of foot (H)     Right     Noninflammatory pericardial effusion      Osteomyelitis (H) 1/11/2014     Osteomyelitis of foot, right, acute (H) 2/15/2017     Osteomyelitis of left foot (H)      PVD (peripheral vascular disease) (H)      Recurrent falls 6/20/2021     Sebaceous cyst        FAMILY HISTORY:  Family History   Problem Relation Age of Onset     Cancer Mother         leukemia     Leukemia Mother        SOCIAL HISTORY:  Socioeconomic History     Marital status: Single   Tobacco Use     Smoking status: Former Smoker     Packs/day: 2.00     Years: 30.00     Pack years: 60.00     Types: Cigarettes     Last attempt to  quit: 1995     Years since quittin.5     Smokeless tobacco: Never Used   Other Topics Concern     Parent/sibling w/ CABG, MI or angioplasty before 65F 55M? Not Asked   Social History Narrative    Lives independently with SO. 2017        CURRENT MEDICATIONS:  acetaminophen (TYLENOL) 325 MG tablet, Take 650 mg by mouth every 4 hours as needed Max 3gm per day for minor pain/fever  alcohol swab prep pads, Use to swab area of injection/jina as directed.  apixaban ANTICOAGULANT (ELIQUIS ANTICOAGULANT) 5 MG tablet, Take 1 tablet (5 mg) by mouth 2 times daily  aspirin (ASA) 81 MG chewable tablet, Take 81 mg by mouth daily  atorvastatin (LIPITOR) 20 MG tablet, Take 1 tablet (20 mg) by mouth daily  Mahsa Protect (EUCERIN) external cream, Apply topically 2 times daily as needed for dry skin or other Apply as needed to intact stage 1 pressure area and/or irritated skin, rash or excoriation  bisacodyl (DULCOLAX) 10 MG suppository, Place 10 mg rectally daily as needed for constipation If no BM or small BM for 3 days/9 shifts  blood glucose (NO BRAND SPECIFIED) test strip, Use to test blood sugar 4 times daily or as directed.  blood glucose calibration (NO BRAND SPECIFIED) solution, 1 drop every 14 days Use 1 drop to calibrate blood glucose monitor every 14 days  blood glucose monitoring (NO BRAND SPECIFIED) meter device kit, Use to test blood sugar 4 times daily or as directed.  cholecalciferol 1000 UNITS TABS, Take 1,000 Units by mouth daily  citalopram (CELEXA) 20 MG tablet, Take 20 mg by mouth every morning   digoxin (LANOXIN) 125 MCG tablet, Take 1 tablet (125 mcg) by mouth daily  empagliflozin (JARDIANCE) 10 MG TABS tablet, Take 1 tablet (10 mg) by mouth daily  insulin glargine (LANTUS PEN) 100 UNIT/ML pen, Inject 50 Units Subcutaneous At Bedtime  insulin lispro (HUMALOG KWIKPEN) 100 UNIT/ML (1 unit dial) KWIKPEN, Inject 16 Units Subcutaneous 2 times daily (before meals) Breakfast and lunch  insulin lispro  (HUMALOG KWIKPEN) 100 UNIT/ML (1 unit dial) KWIKPEN, Inject 18 Units Subcutaneous daily (with dinner)  magnesium hydroxide (MILK OF MAGNESIA) 400 MG/5ML suspension, Take 30 mLs by mouth daily as needed for constipation or heartburn If no BM/small BM for 3 days/9 shifts  metFORMIN (GLUCOPHAGE-XR) 750 MG 24 hr tablet, Take 750 mg by mouth every morning  metoprolol succinate ER (TOPROL-XL) 100 MG 24 hr tablet, Take 1.5 tablets (150 mg) by mouth daily  nitroglycerin (NITROSTAT) 0.4 MG sublingual tablet, For chest pain place 1 tablet under the tongue every 5 minutes for 3 doses. If symptoms persist 5 minutes after 1st dose call 911.  nystatin (MYCOSTATIN) 653626 UNIT/GM external powder, Apply topically 2 times daily as needed (to affected area(s) as needed)  order for DME, Equipment to be ordered:  Scale- Qty 1  Commander Flex - Qty. 1  Pulse-Oximeter - Qty. 1  Use as directed  order for DME, Equipment being ordered: [facility] shoe  pantoprazole (PROTONIX) 40 MG EC tablet, Take 40 mg by mouth daily  sacubitril-valsartan (ENTRESTO)  MG per tablet, Take 1 tablet by mouth 2 times daily  spironolactone (ALDACTONE) 25 MG tablet, Take 1 tablet (25 mg) by mouth daily  thin (NO BRAND SPECIFIED) lancets, 1 each 4 times daily Use with lanceting device.  traZODone (DESYREL) 50 MG tablet, Take 50 mg by mouth At Bedtime  triamcinolone (KENALOG) 0.1 % external cream, Apply topically to right stump, left hand and right forearm twice daily for rash  ULTICARE SHORT 31G X 8 MM insulin pen needle, Inject 1 each Subcutaneous 4 times daily  venetoclax (VENCLEXTA) 100 MG tablet, Take 200 mg by mouth every morning  loperamide (IMODIUM A-D) 2 MG tablet, Take 2-4 mg by mouth 4 times daily as needed for diarrhea Take 4 mg after 1st loose stool, then take 1 tablet as needed after each loose stool  polyethylene glycol (MIRALAX) 17 GM/Dose powder, Take 17 g by mouth daily as needed (Patient not taking: Reported on 8/31/2022)    No current  "facility-administered medications on file prior to visit.      ROS:   See HPI    EXAM:   /85 (BP Location: Left arm, Patient Position: Sitting, Cuff Size: Adult Regular)   Pulse 72   Ht 1.791 m (5' 10.5\")   Wt 86.8 kg (191 lb 4.8 oz)   SpO2 98%   BMI 27.06 kg/m       GENERAL: Appears comfortable, in no acute distress.   HEENT: Eye symmetrical, no discharge or icterus bilaterally. Mucous membranes moist and without lesions.  CV: RRR, +S1S2, no murmur, rub, or gallop. JVP ~6   RESPIRATORY: Respirations regular, even, and unlabored. Lungs CTA throughout.   GI: Soft and non distended   EXTREMITIES: AKA of the right leg. Left leg with trace to 1+ edema. Left leg is warm and well perfused.  NEUROLOGIC: Alert and interacting appropriately. No focal deficits.   MUSCULOSKELETAL: No joint swelling or tenderness.   SKIN: No jaundice. No rashes or lesions.     Labs, reviewed with patient in clinic today:  CBC RESULTS:  Lab Results   Component Value Date    WBC 3.0 (L) 09/25/2021    WBC 3.7 (L) 06/26/2021    RBC 3.40 (L) 09/25/2021    RBC 3.09 (L) 06/26/2021    HGB 10.4 (L) 09/25/2021    HGB 9.6 (L) 06/26/2021    HCT 32.2 (L) 09/25/2021    HCT 28.6 (L) 06/26/2021    MCV 95 09/25/2021    MCV 93 06/26/2021    MCH 30.6 09/25/2021    MCH 31.1 06/26/2021    MCHC 32.3 09/25/2021    MCHC 33.6 06/26/2021    RDW 16.9 (H) 09/25/2021    RDW 14.6 06/26/2021    PLT 99 (L) 09/25/2021     (L) 06/26/2021       CMP RESULTS:  Lab Results   Component Value Date     08/31/2022     06/28/2021    POTASSIUM 4.3 08/31/2022    POTASSIUM 4.2 05/12/2022    POTASSIUM 4.1 06/28/2021    CHLORIDE 106 08/31/2022    CHLORIDE 105 05/12/2022    CHLORIDE 101 12/30/2021    CHLORIDE 109 06/28/2021    CO2 20 (L) 08/31/2022    CO2 20 (L) 05/12/2022    CO2 24 06/28/2021    ANIONGAP 14 08/31/2022    ANIONGAP 14 05/12/2022    ANIONGAP 4 06/28/2021     (H) 08/31/2022     (H) 05/12/2022     (H) 06/28/2021    BUN 28.9 (H) " 08/31/2022    BUN 31 (H) 05/12/2022    BUN 43 (H) 06/28/2021    CR 1.75 (H) 08/31/2022    CR 1.33 (H) 06/28/2021    GFRESTIMATED 39 (L) 08/31/2022    GFRESTIMATED >60 07/08/2021    GFRESTIMATED 51 (L) 06/28/2021    GFRESTBLACK >60 07/08/2021    GFRESTBLACK 59 (L) 06/28/2021    JUSTINO 9.3 08/31/2022    JUSTINO 9.4 06/28/2021    BILITOTAL 0.8 09/23/2021    BILITOTAL 0.9 06/20/2021    ALBUMIN 3.0 (L) 09/23/2021    ALBUMIN 3.3 (L) 06/20/2021    ALKPHOS 84 09/23/2021    ALKPHOS 72 06/20/2021    ALT 12 09/23/2021    ALT 24 06/20/2021    AST 12 09/23/2021    AST 34 06/20/2021        INR RESULTS:  Lab Results   Component Value Date    INR 1.05 04/16/2021       Lab Results   Component Value Date    MAG 1.8 09/25/2021    MAG 2.0 06/23/2021     Lab Results   Component Value Date    NTBNPI 7,364 (H) 06/20/2021     Lab Results   Component Value Date    NTBNP 2,136 (H) 04/14/2022    NTBNP 22,172 (H) 02/09/2018       Diagnostics:     6/22/21 Coronary angiogram    Dist LAD lesion is 40% stenosed.    Mid LM to Prox LAD lesion is 90% stenosed.    Mid LAD lesion is 100% stenosed.    Ost Cx to Dist Cx lesion is 100% stenosed.    Prox Graft lesion is 70% stenosed.    1st Diag lesion is 80% stenosed.    Lat 1st Diag lesion is 90% stenosed.     1.  Severe multivessel CAD s/p CABG.                LM has severe stenosis.                LAD has sever proximal stenosis and midLAD is occluded. Distal LAD fills via patent LIMA.                LCx is occluded.                 RCA has moderate diffuse disease.  2.  LIMA to LAD is patent.  Mid to distal LAD stents are patent.  3.  SVG to OM is patent.  4.  SVG to Diagonal is patent with a 70% stenosis and supplies diagonal branches are small and diffusely diseased.    Diseased diagonal graft supplies very severely diffusely diseased diagonal branches.  PCI of the vein graft is unlikely to provide much benefit and there is significant risk of acute occlusion due to poor runoff with severe disease on the  native diagonal branch vessels which are too small for PCI.    Recommend medical management.  This was discussed with referring cardiologist.      TTE 6/20/21  1. Left ventricular systolic function is severely reduced. The visual ejection  fraction is estimated at 20-25%.  2. The right ventricle is normal in structure, function and size.  3. The left atrium is moderately dilated.  4. The aortic valve is trileaflet with aortic valve sclerosis.  5. There is mild (1+) mitral regurgitation.  ______________________________________________________________________________      TTE 10/5/18  The visual ejection fraction is estimated at 35-40%.  There is mild-moderate global hypokinesia of the left ventricle.  Mildly decreased right ventricular systolic function  In comparison with the previous study the LVEF has improved slightly.    Assessment and Plan:   Mr. Manning is a 81 year old male with a past medical history including CLL in remission, CAD s/p CABG in 1995, HFrEF 2/2 ICM, atrial flutter, PVD s/p iliac artery stents and left lower extremity bypass, hypertension, diabetes, hyperlipidemia, prosthesis on leg, discitis. Presents to clinic for CORE f/u.    We got a repeat ECHO today after titrating patient's medications. We did see some improvement with EF improving from 20-25% to 30-35%. We did discuss an ICD today and he is potentially interested. His CLL is stable. No other life limiting diagnosis that he or we aer aware of. I have referred him to EP to discuss ICD vs CRT-D.    # Chronic systolic heart failure/HFrEF secondary to ICM  (Ef 20-25% now improved to 30-35%)  Stage C. NYHA Class IIIA  Fluid status: euvolemic, can continue 20 mg daily. Change is PRN dosing to taking 20 mg extra lasix on days his weight is above 193 lbs.  ACEi/ARB/ARNI: Entresto 97/103 BID -max dose acheived  BB: metoprolol XL to 175 mg daily, ongoing titration  Aldosterone antagonist: Aldactone 25 mg  SGLT2i- Jardiance 10 mg daily  SCD  prophylaxis: EF improved, but still below 35%, will refer to EP for consideration of ICD vs CRT-D (wide qrs, but no LBBB)  NSAID use: contraindicated  Sleep apnea evaluation: not discussed today  Remote monitoring: consider at future visits, although now lives at an assisted living where he has more supervision    # CAD s/p CABG  # PAD s/p iliac stenting and LE bypass  - No anginal symptoms, he is on eliquis and statin (defer high potency to cardiologist); he has stable claudication    # Atrial flutter  - He is on BB and elquis    # CKD stage 3b  - Cr stable at his baseline at 1.75 toady    # T2DM  - Managed by Dr. Stahl    # CLL, on oral therapy, done with infusions at this point.    Follow-up  - Phone call in 2 weeks for Hrs- consider increasing metoprolol  - CORE clinic in 2 months  - EP in 2 months to discuss ICD     Billing  - I managed 2+ stable chronic conditions  - I changed a prescription medication      Carly Lomas PA-C  [facility]  [facility]        FABY ZURITA

## 2022-08-31 NOTE — NURSING NOTE
Chief Complaint   Patient presents with     Follow Up     Return CORE, 81 year old male with chronic systolic heart failure presents for follow up with labs and echo prior.      Vitals were taken and medications were reconciled.   Dhaval Nuno, EMT  10:27 AM

## 2022-08-31 NOTE — TELEPHONE ENCOUNTER
[facility] Call Center    Phone Message    May a detailed message be left on voicemail: yes     Reason for Call: Other: Hanny from [facility] in [place] has questions about the orders put in today for the pt.  Please call asap     Action Taken: Message routed to:  [facility] ([facility]): cardio    Travel Screening: Not Applicable

## 2022-09-15 NOTE — TELEPHONE ENCOUNTER
Called Hima to check in. Seen in CORE Clinic 2 weeks ago, metoprolol was increased to 175 mg daily (this was done right after clinic visit). Plan was to check in after 2 weeks to see how feeling and possibly increase further.     Hima reports he's feeling ok. No dizziness, lightheadedness or increased fatigue.  Called his nursing office to get most recent vitals.     Nurse reports most recent blood pressure is 142/43, 62 HR. Weight 192.6. Vitals are checked once a month at the [facility] and these are from 9/13/22.     Will review with provider.    Leander Hernandez, - independent agent, called office requesting in home PT orders for patient Dieudonne. He would like for Dieudonne to go through [facility] for his in home visits.     Please call Leander with any questions at [phone].

## 2022-09-16 NOTE — TELEPHONE ENCOUNTER
Reviewed with Deb JENNINGS. Will decrease Metoprolol back to 150 mg daily and consider this maxed out.   Date: 9/16/2022    Time of Call: 2:30 PM     Diagnosis:  HF     [ VORB ] Ordering provider: Deb JENNINGS  Order: Decrease Metoprolol to 150 mg daily.      Order received by: Verónica Jarquin RN      Follow-up/additional notes: Will fax updated orders to Assisted Living. Called and updated them as well by leaving message.

## 2022-10-26 NOTE — LETTER
10/26/2022      RE: Tad Manning  [address] [place] [id number]       Dear Colleague,    Thank you for the opportunity to participate in the care of your patient, Tad Manning, at the [facility] [place] at [facility]. Please see a copy of my visit note below.        ELECTROPHYSIOLOGY CLINIC VISIT    Assessment/Recommendations   Assessment/Plan:    Mr. Doyle is a 81 year old male who has a past medical history significant for CLL (ongoing treatment), CAD s/p CABG (LIMA-LAD, SVG-OM1, SVG-D1) in 1995, s/p PCI p-m LAD 2018, ICM LVEF 30-35%, PAF/AFL (CHADSVASC 6 on Eliquis), PVD s/p iliac artery stents and left lower extremity bypass, HTN, DM, HLD, critical limb ischemia s/p prosthesis on leg, osteomyelitis of right and left foot, and CKD III.    CAD s/p CABG X3 1995 s/p PCI 2018  ICM LVEF 30-35%, NYHA II-III  PAF/AFL:  1. ACEi/ARB: Continue Entresto.   2. BB: Continue Toprol XL.   3. Aldosterone antagonist: Continue Spironolactone.   4. Antiplatlet: Continue ASA  5. Statin: Continue Lipitor.  6. Nitroglycerin 0.4 mg PRN for chest pain. Place 1 tablet (0.4 mg) under the tongue every 5 minutes as needed for chest pain. Maximum of 3 doses in 15 minutes.  7. SCD prophylaxis: We discussed that he does have indication for ICD given reduced LVEF despite GDMT. We discussed there is no specific data of benefit of ICD for patients 80 or older with his comorbities. We discussed with the patient the rationale for ICD placement, alternative therapies,  technical aspects of the surgical procedure, risks/benefits of therapy and need for long-term follow-up in the Device Clinic.  The risk of defibrillator placement include: over sedation, reaction to local anesthetic, reaction to narcotics or benzodiazipines used for moderate secation, localized bleeding, internal bleeding, collapsed lung, and acute or late infections. There is the  possibilty of unforseen complications as well such as device or lead failure, lead dislodgement, and inappropriate shocks from the defibrillator. An educational handout regarding ICD therapy was reviewed with the patient.  All question/concerns were addressed. After our discussion, the patient wishes to discuss with his family goals of care, but is leaning towards defering ICD at this time. He will let us know if he decides to pursue.   8. Fluid status: Continue Torsemide.   9. AF/AFL: On Digoxin and Toprol XL for rate control, Eliquis for stroke prophylaxis. No recent AF episodes, will continue to monitor.   10. Lifestyle: Avoid tobacco, maintain a healthy weight, limit alcohol, cardiac diet, and exercise.    Follow up with EP on an as needed basis.        History of Present Illness/Subjective    Mr. Tad Manning is a 81 year old male who comes in today for EP consultation of consideration for ICD.    Mr. Doyle is a 81 year old male who has a past medical history significant for CLL (ongoing treatment), CAD s/p CABG (LIMA-LAD, SVG-OM1, SVG-D1) in 1995, s/p PCI p-m LAD 2018, ICM LVEF 30-35%, PAF/AFL (CHADSVASC 6 on Eliquis), PVD s/p iliac artery stents and left lower extremity bypass, HTN, DM, HLD, critical limb ischemia s/p prosthesis on leg, osteomyelitis of right and left foot, and CKD III.    He has known history of CAD. He underwent prior CABG in 1995. In 3/2018 which reportedly showed moderate disease in his RCA, severe LAD disease and patent grafts: he had two drug eluting stents placed in the proximal and mid LAD. Right sided catheterization done concomitantly showed elevated right and left sided pressures with type II pulmonary hypertension.In 7/2021, he had NSTEMI and underwent coronary angiogram which showed progression of his CAD with LM worse, new stenosis of the saphenous vein graft ported 70%.  Moderate stenosis of the left main, severe stenosis of the left anterior descending with mid LAD  occlusion.  Distal LAD filling via patent LIMA.  Circumflex is occluded, diffuse disease in the RCA.  LIMA to the LAD was patent, vein graft to the OM is patent, vein graft to a diagonal was patent with a 70% stenosis.  It was reported to supply small diagonal branches. He has developed ICM. LVEF was 35-40% in 2018 and then decreased to 20-25%. He has been on GDMT which has been up titrated. Most recent echo showed LVEF 30-35% in 8/2022. He had CLL 10 years ago and had 2 big rounds of chemo, reports now in remission.     He was referred to EP for consideration for ICD. He reports feeling at baseline. He denies chest discomfort, palpitations, abdominal fullness/bloating or peripheral edema, shortness of breath, paroxysmal nocturnal dyspnea, orthopnea, lightheadedness, dizziness, pre-syncope, or syncope. Presenting 12 lead ECG shows SR 1 AVB Vent Rate 67 bpm,  ms,  ms, QTc 429 ms. Current cardiac medications include: Lasix, Toprol XL, Jardiance, Spironolactone, Entresto, Eliquis, Digoxin, and Lipitor.         I have reviewed and updated the patient's Past Medical History, Social History, Family History and Medication List.     Cardiographics (Personally Reviewed) :   8/31/22 Echo:   Interpretation Summary  The Left ventricular function is moderately reduced. The visually estimated  ejection fraction is 30-35%  Global right ventricular function is borderline reduced.  Moderate left atrial enlargement is present.  No pericardial effusion is present.     This study was compared with the study from 06/20/2021. Ejection fraction has  improved slightly.  Pulmonary artery systolic pressure cannot be assessed.  The left ventricular size has improved.    6/22/21 Coronary Angiogram:    Dist LAD lesion is 40% stenosed.    Mid LM to Prox LAD lesion is 90% stenosed.    Mid LAD lesion is 100% stenosed.    Ost Cx to Dist Cx lesion is 100% stenosed.    Prox Graft lesion is 70% stenosed.    1st Diag lesion is 80%  "stenosed.    Lat 1st Diag lesion is 90% stenosed.     1.  Severe multivessel CAD s/p CABG.                LM has severe stenosis.                LAD has sever proximal stenosis and midLAD is occluded. Distal LAD fills via patent LIMA.                LCx is occluded.                 RCA has moderate diffuse disease.  2.  LIMA to LAD is patent.  Mid to distal LAD stents are patent.  3.  SVG to OM is patent.  4.  SVG to Diagonal is patent with a 70% stenosis and supplies diagonal branches are small and diffusely diseased.        Physical Examination   /45 (BP Location: Right arm, Patient Position: Sitting, Cuff Size: Adult Regular)   Pulse 66   Ht 1.801 m (5' 10.9\")   Wt 91 kg (200 lb 9.6 oz)   SpO2 99%   BMI 28.06 kg/m    Wt Readings from Last 3 Encounters:   08/31/22 86.8 kg (191 lb 4.8 oz)   05/02/22 89 kg (196 lb 1.6 oz)   04/14/22 89.8 kg (198 lb)     General Appearance:   Alert, well-appearing and in no acute distress.   HEENT: Atraumatic, normocephalic. PERRL.  MMM.   Chest/Lungs:   Respirations unlabored.  Lungs are clear to auscultation.   Cardiovascular:   Regular rate and rhythm.  S1/S2. No murmur.    Abdomen:  Soft, nontender, nondistended.   Extremities: No cyanosis or clubbing.    Musculoskeletal: Moves all extremities..   Skin: Warm, dry, intact.    Neurologic: Mood and affect are appropriate.  Alert and oriented to person, place, time, and situation.          Medications  Allergies   Current Outpatient Medications   Medication Sig Dispense Refill     acetaminophen (TYLENOL) 325 MG tablet Take 650 mg by mouth every 4 hours as needed Max 3gm per day for minor pain/fever       alcohol swab prep pads Use to swab area of injection/jina as directed. 100 each 3     apixaban ANTICOAGULANT (ELIQUIS ANTICOAGULANT) 5 MG tablet Take 1 tablet (5 mg) by mouth 2 times daily 180 tablet 3     aspirin (ASA) 81 MG chewable tablet Take 81 mg by mouth daily       atorvastatin (LIPITOR) 20 MG tablet Take 1 tablet " (20 mg) by mouth daily 90 tablet 0     Mahsa Protect (EUCERIN) external cream Apply topically 2 times daily as needed for dry skin or other Apply as needed to intact stage 1 pressure area and/or irritated skin, rash or excoriation       bisacodyl (DULCOLAX) 10 MG suppository Place 10 mg rectally daily as needed for constipation If no BM or small BM for 3 days/9 shifts       blood glucose (NO BRAND SPECIFIED) test strip Use to test blood sugar 4 times daily or as directed. 100 strip 6     blood glucose calibration (NO BRAND SPECIFIED) solution 1 drop every 14 days Use 1 drop to calibrate blood glucose monitor every 14 days 1 Bottle 3     blood glucose monitoring (NO BRAND SPECIFIED) meter device kit Use to test blood sugar 4 times daily or as directed. 1 kit 0     cholecalciferol 1000 UNITS TABS Take 1,000 Units by mouth daily 30 tablet      citalopram (CELEXA) 20 MG tablet Take 20 mg by mouth every morning        digoxin (LANOXIN) 125 MCG tablet Take 1 tablet (125 mcg) by mouth daily 90 tablet 3     empagliflozin (JARDIANCE) 10 MG TABS tablet Take 1 tablet (10 mg) by mouth daily 90 tablet 1     furosemide (LASIX) 20 MG tablet Take 1 tablet (20 mg) by mouth daily 90 tablet 3     insulin glargine (LANTUS PEN) 100 UNIT/ML pen Inject 50 Units Subcutaneous At Bedtime       insulin lispro (HUMALOG KWIKPEN) 100 UNIT/ML (1 unit dial) KWIKPEN Inject 16 Units Subcutaneous 2 times daily (before meals) Breakfast and lunch       insulin lispro (HUMALOG KWIKPEN) 100 UNIT/ML (1 unit dial) KWIKPEN Inject 18 Units Subcutaneous daily (with dinner)       loperamide (IMODIUM A-D) 2 MG tablet Take 2-4 mg by mouth 4 times daily as needed for diarrhea Take 4 mg after 1st loose stool, then take 1 tablet as needed after each loose stool       magnesium hydroxide (MILK OF MAGNESIA) 400 MG/5ML suspension Take 30 mLs by mouth daily as needed for constipation or heartburn If no BM/small BM for 3 days/9 shifts       metFORMIN (GLUCOPHAGE-XR) 750  MG 24 hr tablet Take 750 mg by mouth every morning       metoprolol succinate ER (TOPROL-XL) 100 MG 24 hr tablet Take 1.5 tablets (150 mg) by mouth daily 135 tablet 1     nitroglycerin (NITROSTAT) 0.4 MG sublingual tablet For chest pain place 1 tablet under the tongue every 5 minutes for 3 doses. If symptoms persist 5 minutes after 1st dose call 911. 25 tablet      nystatin (MYCOSTATIN) 048877 UNIT/GM external powder Apply topically 2 times daily as needed (to affected area(s) as needed)       order for DME Equipment to be ordered:  Scale- Qty 1  Commander Flex - Qty. 1  Pulse-Oximeter - Qty. 1  Use as directed 1 Units 0     order for DME Equipment being ordered: DH2 shoe 1 Device 0     pantoprazole (PROTONIX) 40 MG EC tablet Take 40 mg by mouth daily       polyethylene glycol (MIRALAX) 17 GM/Dose powder Take 17 g by mouth daily as needed (Patient not taking: Reported on 8/31/2022) 510 g 0     sacubitril-valsartan (ENTRESTO)  MG per tablet Take 1 tablet by mouth 2 times daily 180 tablet 3     spironolactone (ALDACTONE) 25 MG tablet Take 1 tablet (25 mg) by mouth daily 90 tablet 3     thin (NO BRAND SPECIFIED) lancets 1 each 4 times daily Use with lanceting device. 100 each 1     traZODone (DESYREL) 50 MG tablet Take 50 mg by mouth At Bedtime       triamcinolone (KENALOG) 0.1 % external cream Apply topically to right stump, left hand and right forearm twice daily for rash       ULTICARE SHORT 31G X 8 MM insulin pen needle Inject 1 each Subcutaneous 4 times daily       venetoclax (VENCLEXTA) 100 MG tablet Take 200 mg by mouth every morning      Allergies   Allergen Reactions     Blood Transfusion Related (Informational Only) Other (See Comments)     Patient has a history of a clinically significant antibody against RBC antigens.  A delay in compatible RBCs may occur.      Blood-Group Specific Substance Other (See Comments)     Patient has a suggestive Warm Auto-antibody. Blood products may be delayed. Draw  patient 24 hours prior to transfusion. Draw one red top and two purple top tubes for all type and screen orders.         Lab Results (Personally Reviewed)    Chemistry/lipid CBC Cardiac Enzymes/BNP/TSH/INR   Lab Results   Component Value Date    BUN 30.6 (H) 09/08/2022     09/08/2022    CO2 22 09/08/2022     Creatinine   Date Value Ref Range Status   09/08/2022 1.74 (H) 0.67 - 1.17 mg/dL Final   06/28/2021 1.33 (H) 0.66 - 1.25 mg/dL Final       Lab Results   Component Value Date    CHOL 116 06/21/2021    HDL 38 (L) 06/21/2021    LDL 49 06/21/2021      Lab Results   Component Value Date    WBC 6.0 09/08/2022    HGB 10.7 (L) 09/08/2022    HCT 33.9 (L) 09/08/2022    MCV 97 09/08/2022     (L) 09/08/2022    Lab Results   Component Value Date    TROPONINI 0.06 09/22/2021    BNP 2,340 (H) 09/22/2021    TSH 1.11 10/03/2018    INR 1.05 04/16/2021        The patient states understanding and is agreeable with the plan.   Geoff Landeros MD [facility]  Cardiology - Electrophysiology        Total time spent on patient visit, reviewing notes, imaging, labs, orders, and completing necessary documentation: 60 minutes.

## 2022-10-26 NOTE — NURSING NOTE
Chief Complaint   Patient presents with     Follow Up     1 month core      Vitals were taken and medications were reconciled.   Dhaval Nuno, EMT  3:50 PM

## 2022-10-26 NOTE — PATIENT INSTRUCTIONS
Take your medicines every day, as directed    Changes made today:  Increase metoprolol to 200mg once daily     Monitor Your Weight and Symptoms    Contact us if you:    Gain 2 pounds in one day or 5 pounds in one week  Feel more short of breath  Notice more leg swelling  Feel lightheadeded   Change your lifestyle    Limit Salt or Sodium:  2000 mg  Limit Fluids:  2000 mL or approximately 64 ounces  Eat a Heart Healthy Diet  Low in saturated fats  Stay Active:  Aim to move at least 150 minutes every  week         To Contact us    During Business Hours:  [phone], option # 1      After hours, weekends or holidays:   [phone], Option #4  Ask to speak to the On-Call Cardiologist. Inform them you are a CORE/heart failure patient at the [facility].     Use [employer] allows you to communicate directly with your heart team through secure messaging.  [employer] can be accessed any time on your phone, computer, or tablet.  If you need assistance, we'd be happy to help!         Keep your Heart Appointments:    Return to see CORE clinic in 1 month     Please consider attending our virtual support group which is held monthly. Please reach out to [facility] at [phone] for more information if you are interested in attending.     2022 dates:   - Monday, November 7th, 1-2pm: topic: What's new in heart failure research and treatment?  - Monday, December 5th, 1-2pm: topic: How to thrive during winter and the holiday season with heart failure    2023 dates:  Monday, January 2nd , 1-2pm  Monday, February 6th , 1-2pm  Monday, March 6th , 1-2pm  Monday, April 3rd, 1-2pm  Monday, May 1st, 1-2pm  Monday, June 5th, 1-2pm  Monday, July 3rd, 1-2pm  Monday, August 7th, 1-2pm  Monday, September 11th, 1-2pm  Monday, October 2nd, 1-2pm  Monday, November 6th, 1-2pm  Monday, December 4th, 1-2pm

## 2022-10-26 NOTE — LETTER
10/26/2022      RE: Tad Manning  [address]  [facility] [address]       Dear Colleague,    Thank you for the opportunity to participate in the care of your patient, Tad Manning, at the [facility] [place] at [facility]. Please see a copy of my visit note below.    HPI: 81 yr old male patient returns to [facility] clinic for follow up of ICM. He is accompanied by his sister in law. Prior to CORE clinic he was seen by Dr. Hester for consideration of ICD. He has been feeling well from a HF standpoint. Pt denies SOB, orthopnea, PND, chest pain, belly bloating, loss of appetite, LE edema. Pt states energy level is fair.  Recent echo (8/31/22) showed an increase in EF to 35%.  He is continuing to receive therapy for CLL. Pt had his metoprolol increased at his last [facility]ORE visit and has tolerated that without difficulty.     We had a long discussion (30 minutes) about the benefits/risks/reasons for ICD. He is DNR/DNI and wants to think about whether he wants to pursue ICD therapy. He does not meet criteria for CRT.    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)      Chronic systolic heart failure (H) 6/6/2017     Confusion 6/20/2021     Coronary artery disease     triple bypass 1995     CRF (chronic renal failure), stage 3 (moderate) (H) 1/20/2017     Critical lower limb ischemia (H) 1/10/2014     Diabetes mellitus (H)       Diabetes mellitus, type 2 (H) 2014     Diabetic polyneuropathy (H)      Discitis 2017     Epidural abscess 2017     Fall 2017     History of blood transfusion      HTN (hypertension) 2014     Hyperglycemia 2020     Hyperlipidaemia      Hyperlipidemia with target LDL less than 100 2014    Formatting of this note might be different from the original. Overview:  Diagnosis updated by automated process. Provider to review and confirm. Formatting of this note might be different from the original. Diagnosis updated by automated process. Provider to review and confirm.     Hypertension      Long term current use of anticoagulant therapy 2018     Non-healing ulcer of foot (H)     Right     Noninflammatory pericardial effusion      Osteomyelitis (H) 2014     Osteomyelitis of foot, right, acute (H) 2/15/2017     Osteomyelitis of left foot (H)      PVD (peripheral vascular disease) (H)      Recurrent falls 2021     Sebaceous cyst        FAMILY HISTORY:  Family History   Problem Relation Age of Onset     Cancer Mother         leukemia     Leukemia Mother        SOCIAL HISTORY:  Social History     Socioeconomic History     Marital status: Single     Spouse name: None     Number of children: None     Years of education: None     Highest education level: None   Tobacco Use     Smoking status: Former     Packs/day: 2.00     Years: 30.00     Pack years: 60.00     Types: Cigarettes     Quit date: 1995     Years since quittin.8     Smokeless tobacco: Never   Substance and Sexual Activity     Alcohol use: No     Drug use: No   Social History Narrative    Lives independently with SO. 2017        CURRENT MEDICATIONS:  Current Outpatient Medications   Medication Sig Dispense Refill     acetaminophen (TYLENOL) 325 MG tablet Take 650 mg by mouth every 4 hours as needed Max 3gm per day for minor pain/fever       alcohol swab prep pads Use to swab area of injection/jina as directed.  100 each 3     apixaban ANTICOAGULANT (ELIQUIS ANTICOAGULANT) 5 MG tablet Take 1 tablet (5 mg) by mouth 2 times daily 180 tablet 3     aspirin (ASA) 81 MG chewable tablet Take 81 mg by mouth daily       atorvastatin (LIPITOR) 20 MG tablet Take 1 tablet (20 mg) by mouth daily 90 tablet 0     Mahsa Protect (EUCERIN) external cream Apply topically 2 times daily as needed for dry skin or other Apply as needed to intact stage 1 pressure area and/or irritated skin, rash or excoriation       bisacodyl (DULCOLAX) 10 MG suppository Place 10 mg rectally daily as needed for constipation If no BM or small BM for 3 days/9 shifts       blood glucose (NO BRAND SPECIFIED) test strip Use to test blood sugar 4 times daily or as directed. 100 strip 6     blood glucose calibration (NO BRAND SPECIFIED) solution 1 drop every 14 days Use 1 drop to calibrate blood glucose monitor every 14 days 1 Bottle 3     blood glucose monitoring (NO BRAND SPECIFIED) meter device kit Use to test blood sugar 4 times daily or as directed. 1 kit 0     cholecalciferol 1000 UNITS TABS Take 1,000 Units by mouth daily 30 tablet      citalopram (CELEXA) 20 MG tablet Take 20 mg by mouth every morning        digoxin (LANOXIN) 125 MCG tablet Take 1 tablet (125 mcg) by mouth daily 90 tablet 3     empagliflozin (JARDIANCE) 10 MG TABS tablet Take 1 tablet (10 mg) by mouth daily 90 tablet 1     furosemide (LASIX) 20 MG tablet Take 1 tablet (20 mg) by mouth daily 90 tablet 3     insulin glargine (LANTUS PEN) 100 UNIT/ML pen Inject 50 Units Subcutaneous At Bedtime       insulin lispro (HUMALOG KWIKPEN) 100 UNIT/ML (1 unit dial) KWIKPEN Inject 16 Units Subcutaneous 2 times daily (before meals) Breakfast and lunch       insulin lispro (HUMALOG KWIKPEN) 100 UNIT/ML (1 unit dial) KWIKPEN Inject 18 Units Subcutaneous daily (with dinner)       loperamide (IMODIUM A-D) 2 MG tablet Take 2-4 mg by mouth 4 times daily as needed for diarrhea Take 4 mg after 1st loose stool, then  take 1 tablet as needed after each loose stool       magnesium hydroxide (MILK OF MAGNESIA) 400 MG/5ML suspension Take 30 mLs by mouth daily as needed for constipation or heartburn If no BM/small BM for 3 days/9 shifts       metFORMIN (GLUCOPHAGE-XR) 750 MG 24 hr tablet Take 750 mg by mouth every morning       metoprolol succinate ER (TOPROL-XL) 100 MG 24 hr tablet Take 1.5 tablets (150 mg) by mouth daily 135 tablet 1     nitroglycerin (NITROSTAT) 0.4 MG sublingual tablet For chest pain place 1 tablet under the tongue every 5 minutes for 3 doses. If symptoms persist 5 minutes after 1st dose call 911. 25 tablet      nystatin (MYCOSTATIN) 368714 UNIT/GM external powder Apply topically 2 times daily as needed (to affected area(s) as needed)       order for DME Equipment to be ordered:  Scale- Qty 1  Commander Flex - Qty. 1  Pulse-Oximeter - Qty. 1  Use as directed 1 Units 0     order for DME Equipment being ordered: DH2 shoe 1 Device 0     pantoprazole (PROTONIX) 40 MG EC tablet Take 40 mg by mouth daily       polyethylene glycol (MIRALAX) 17 GM/Dose powder Take 17 g by mouth daily as needed 510 g 0     sacubitril-valsartan (ENTRESTO)  MG per tablet Take 1 tablet by mouth 2 times daily 180 tablet 3     spironolactone (ALDACTONE) 25 MG tablet Take 1 tablet (25 mg) by mouth daily 90 tablet 3     thin (NO BRAND SPECIFIED) lancets 1 each 4 times daily Use with lanceting device. 100 each 1     traZODone (DESYREL) 50 MG tablet Take 50 mg by mouth At Bedtime       triamcinolone (KENALOG) 0.1 % external cream Apply topically to right stump, left hand and right forearm twice daily for rash       ULTICARE SHORT 31G X 8 MM insulin pen needle Inject 1 each Subcutaneous 4 times daily       venetoclax (VENCLEXTA) 100 MG tablet Take 200 mg by mouth every morning         ROS:   Constitutional: No fever, chills, or sweats. No weight gain/loss.   ENT: No visual disturbance, ear ache, epistaxis, sore throat.  "  Allergies/Immunologic: Negative.   Respiratory: No cough, hemoptysis.   Cardiovascular: As per HPI.   GI: No nausea, vomiting, hematemesis, melena, or hematochezia.   : No urinary frequency, dysuria, or hematuria.   Integument: Negative.   Psychiatric: Negative.   Neuro: Negative.   Endocrinology: Negative.   Musculoskeletal: Negative.    EXAM:  /45 (BP Location: Right arm, Patient Position: Sitting, Cuff Size: Adult Regular)   Pulse 66   Ht 1.801 m (5' 10.9\")   Wt 90.8 kg (200 lb 1.6 oz)   SpO2 99%   BMI 27.99 kg/m    General: appears comfortable, alert and articulate  Head: normocephalic, atraumatic  Eyes: anicteric sclera, EOMI  Neck: no adenopathy  Orophyarynx: moist mucosa, no lesions, dentition intact  Heart: regular, S1/S2, no murmur, gallop, rub, estimated JVP 8cm  Lungs: clear, no rales or wheezing  Abdomen: soft, non-tender, bowel sounds present, no hepatosplenomegaly  Extremities: no clubbing, cyanosis or edema  Neurological: normal speech and affect, no gross motor deficits    Labs:  CBC RESULTS:  Lab Results   Component Value Date    WBC 6.0 09/08/2022    WBC 3.7 (L) 06/26/2021    RBC 3.49 (L) 09/08/2022    RBC 3.09 (L) 06/26/2021    HGB 10.7 (L) 09/08/2022    HGB 9.6 (L) 06/26/2021    HCT 33.9 (L) 09/08/2022    HCT 28.6 (L) 06/26/2021    MCV 97 09/08/2022    MCV 93 06/26/2021    MCH 30.7 09/08/2022    MCH 31.1 06/26/2021    MCHC 31.6 09/08/2022    MCHC 33.6 06/26/2021    RDW 16.7 (H) 09/08/2022    RDW 14.6 06/26/2021     (L) 09/08/2022     (L) 06/26/2021       CMP RESULTS:  Lab Results   Component Value Date     10/26/2022     06/28/2021    POTASSIUM 4.2 10/26/2022    POTASSIUM 4.2 05/12/2022    POTASSIUM 4.1 06/28/2021    CHLORIDE 107 10/26/2022    CHLORIDE 105 05/12/2022    CHLORIDE 101 12/30/2021    CHLORIDE 109 06/28/2021    CO2 20 (L) 10/26/2022    CO2 20 (L) 05/12/2022    CO2 24 06/28/2021    ANIONGAP 14 10/26/2022    ANIONGAP 14 05/12/2022    ANIONGAP 4 " 06/28/2021     (H) 10/26/2022     (H) 05/12/2022     (H) 06/28/2021    BUN 33.2 (H) 10/26/2022    BUN 31 (H) 05/12/2022    BUN 43 (H) 06/28/2021    CR 1.93 (H) 10/26/2022    CR 1.33 (H) 06/28/2021    GFRESTIMATED 35 (L) 10/26/2022    GFRESTIMATED >60 07/08/2021    GFRESTIMATED 51 (L) 06/28/2021    GFRESTBLACK >60 07/08/2021    GFRESTBLACK 59 (L) 06/28/2021    JUSTINO 9.8 10/26/2022    JUSTINO 9.4 06/28/2021    BILITOTAL 0.8 09/23/2021    BILITOTAL 0.9 06/20/2021    ALBUMIN 3.0 (L) 09/23/2021    ALBUMIN 3.3 (L) 06/20/2021    ALKPHOS 84 09/23/2021    ALKPHOS 72 06/20/2021    ALT 12 09/23/2021    ALT 24 06/20/2021    AST 12 09/23/2021    AST 34 06/20/2021        INR RESULTS:  Lab Results   Component Value Date    INR 1.05 04/16/2021       Lab Results   Component Value Date    MAG 1.8 09/25/2021    MAG 2.0 06/23/2021     Lab Results   Component Value Date    NTBNPI 7,364 (H) 06/20/2021     Lab Results   Component Value Date    NTBNP 2,136 (H) 04/14/2022    NTBNP 22,172 (H) 02/09/2018       Assessment and Plan:   1. Chronic systolic heart failure secondary to ICM with EF 35%.    Stage C  NYHA Class III  ACEi/ARB yes (ON max dose Entresto)  BB titration ongoing : Will increase BB (metoprolol to 200mg daily)   Aldosterone antagonist yes  SGLT2i: Yes  SCD prophylaxis pt seen by EP and is considering options  % BiV pacing: N/A  Fluid status euvolemic  NSAID use: no  Sleep Apnea Evaluation: n/a    2. CLL : ongoing therapy per oncology    3. CAD: S/P CAGB 1995; no anginal sx; on ASA/Statin/BB    4. PVD; S/P iliac stent    5. HTN: good control on current regime    6. DM: managed by PCP - On Jardiance    7. HLD: on statin     8. A.flutter: On Eliqouis/BB    9. CKD3:Multifactoral - HTN/DM/ICM:  Cr within normal range for patient.   Follow-up 1 month with CORE    CC  Patient Care Team:  Gene Guevara MD as PCP - Verónica Alvarado RN as Nurse Coordinator (Cardiology)  Sugey Levi APRN CNP as Nurse  Practitioner (Cardiology)  Clarisse Valdes MD as MD (Cardiovascular Disease)  Taylor Arreola, RN as Specialty Care Coordinator (Cardiology)  Liset Collado, RN as Specialty Care Coordinator  Cathi Gomez, RN as Specialty Care Coordinator (Cardiology)  Lindsay Shearer APRN CNP as Nurse Practitioner (Interventional Cardiology)  Hans Dial, JESSICA as Assigned Musculoskeletal Provider  Mathieu Lomas PA-C as Assigned Heart and Vascular Provider  Denny Ruiz, PharmD as Pharmacist (Pharmacist)  Ender Hendrickson, CHRISTINE (Optometry)  Ender Hendrickson, OD as Assigned Surgical Provider  Denny Ruiz, PharmD as Assigned MTM Pharmacist  Esthela Jesus, RN as Specialty Care Coordinator (Cardiology)  Geoff Landeros MD as MD (Cardiovascular Disease)  MATHIEU LOMAS        Please do not hesitate to contact me if you have any questions/concerns.     Sincerely,     OFELIA Bowers CNP

## 2022-10-26 NOTE — NURSING NOTE
Diet: Patient instructed regarding a heart failure healthy diet, including discussion of reduced fat and 2000 mg daily sodium restriction, daily weights, medication purpose and compliance, fluid restrictions and resources for patient and family to access for assistance with heart failure management.       Labs: Patient was given results of the laboratory testing obtained today and patient was instructed about when to return for the next laboratory testing.     Med Reconcile: Reviewed and verified all current medications with the patient. The updated medication list was printed and given to the patient. INCREASE metoprolol to 200mg once daily.     Return Appointment: Patient given instructions regarding scheduling next clinic visit. RTC for CORE in 1 month.    Patient stated he understood all health information given and agreed to call with further questions or concerns.     Chitra Krishna RN

## 2022-10-26 NOTE — PROGRESS NOTES
HPI: 81 yr old male patient returns to [facility] for follow up of ICM. He is accompanied by his sister in law. Prior to [facility] clinic he was seen by Dr. Hester for consideration of ICD. He has been feeling well from a HF standpoint. Pt denies SOB, orthopnea, PND, chest pain, belly bloating, loss of appetite, LE edema. Pt states energy level is fair.  Recent echo (8/31/22) showed an increase in EF to 35%.  He is continuing to receive therapy for CLL. Pt had his metoprolol increased at his last [facility] visit and has tolerated that without difficulty.     We had a long discussion (30 minutes) about the benefits/risks/reasons for ICD. He is DNR/DNI and wants to think about whether he wants to pursue ICD therapy. He does not meet criteria for CRT.    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)      Chronic systolic heart failure (H) 6/6/2017     Confusion 6/20/2021     Coronary artery disease     triple bypass 1995     CRF (chronic renal failure), stage 3 (moderate) (H) 1/20/2017     Critical lower limb ischemia (H) 1/10/2014     Diabetes mellitus (H)      Diabetes mellitus, type 2 (H) 1/11/2014     Diabetic polyneuropathy (H)      Discitis 2/8/2017     Epidural abscess 2/9/2017     Fall 2/7/2017     History of blood transfusion      HTN (hypertension) 1/11/2014     Hyperglycemia 1/9/2020     Hyperlipidaemia      Hyperlipidemia with target LDL less than 100 1/11/2014    Formatting of this note might be different  from the original. Overview:  Diagnosis updated by automated process. Provider to review and confirm. Formatting of this note might be different from the original. Diagnosis updated by automated process. Provider to review and confirm.     Hypertension      Long term current use of anticoagulant therapy 2018     Non-healing ulcer of foot (H)     Right     Noninflammatory pericardial effusion      Osteomyelitis (H) 2014     Osteomyelitis of foot, right, acute (H) 2/15/2017     Osteomyelitis of left foot (H)      PVD (peripheral vascular disease) (H)      Recurrent falls 2021     Sebaceous cyst        FAMILY HISTORY:  Family History   Problem Relation Age of Onset     Cancer Mother         leukemia     Leukemia Mother        SOCIAL HISTORY:  Social History     Socioeconomic History     Marital status: Single     Spouse name: None     Number of children: None     Years of education: None     Highest education level: None   Tobacco Use     Smoking status: Former     Packs/day: 2.00     Years: 30.00     Pack years: 60.00     Types: Cigarettes     Quit date: 1995     Years since quittin.8     Smokeless tobacco: Never   Substance and Sexual Activity     Alcohol use: No     Drug use: No   Social History Narrative    Lives independently with SO. 2017        CURRENT MEDICATIONS:  Current Outpatient Medications   Medication Sig Dispense Refill     acetaminophen (TYLENOL) 325 MG tablet Take 650 mg by mouth every 4 hours as needed Max 3gm per day for minor pain/fever       alcohol swab prep pads Use to swab area of injection/jina as directed. 100 each 3     apixaban ANTICOAGULANT (ELIQUIS ANTICOAGULANT) 5 MG tablet Take 1 tablet (5 mg) by mouth 2 times daily 180 tablet 3     aspirin (ASA) 81 MG chewable tablet Take 81 mg by mouth daily       atorvastatin (LIPITOR) 20 MG tablet Take 1 tablet (20 mg) by mouth daily 90 tablet 0     Mahsa Protect (EUCERIN) external cream Apply topically 2 times daily as  needed for dry skin or other Apply as needed to intact stage 1 pressure area and/or irritated skin, rash or excoriation       bisacodyl (DULCOLAX) 10 MG suppository Place 10 mg rectally daily as needed for constipation If no BM or small BM for 3 days/9 shifts       blood glucose (NO BRAND SPECIFIED) test strip Use to test blood sugar 4 times daily or as directed. 100 strip 6     blood glucose calibration (NO BRAND SPECIFIED) solution 1 drop every 14 days Use 1 drop to calibrate blood glucose monitor every 14 days 1 Bottle 3     blood glucose monitoring (NO BRAND SPECIFIED) meter device kit Use to test blood sugar 4 times daily or as directed. 1 kit 0     cholecalciferol 1000 UNITS TABS Take 1,000 Units by mouth daily 30 tablet      citalopram (CELEXA) 20 MG tablet Take 20 mg by mouth every morning        digoxin (LANOXIN) 125 MCG tablet Take 1 tablet (125 mcg) by mouth daily 90 tablet 3     empagliflozin (JARDIANCE) 10 MG TABS tablet Take 1 tablet (10 mg) by mouth daily 90 tablet 1     furosemide (LASIX) 20 MG tablet Take 1 tablet (20 mg) by mouth daily 90 tablet 3     insulin glargine (LANTUS PEN) 100 UNIT/ML pen Inject 50 Units Subcutaneous At Bedtime       insulin lispro (HUMALOG KWIKPEN) 100 UNIT/ML (1 unit dial) KWIKPEN Inject 16 Units Subcutaneous 2 times daily (before meals) Breakfast and lunch       insulin lispro (HUMALOG KWIKPEN) 100 UNIT/ML (1 unit dial) KWIKPEN Inject 18 Units Subcutaneous daily (with dinner)       loperamide (IMODIUM A-D) 2 MG tablet Take 2-4 mg by mouth 4 times daily as needed for diarrhea Take 4 mg after 1st loose stool, then take 1 tablet as needed after each loose stool       magnesium hydroxide (MILK OF MAGNESIA) 400 MG/5ML suspension Take 30 mLs by mouth daily as needed for constipation or heartburn If no BM/small BM for 3 days/9 shifts       metFORMIN (GLUCOPHAGE-XR) 750 MG 24 hr tablet Take 750 mg by mouth every morning       metoprolol succinate ER (TOPROL-XL) 100 MG 24 hr  tablet Take 1.5 tablets (150 mg) by mouth daily 135 tablet 1     nitroglycerin (NITROSTAT) 0.4 MG sublingual tablet For chest pain place 1 tablet under the tongue every 5 minutes for 3 doses. If symptoms persist 5 minutes after 1st dose call 911. 25 tablet      nystatin (MYCOSTATIN) 250384 UNIT/GM external powder Apply topically 2 times daily as needed (to affected area(s) as needed)       order for DME Equipment to be ordered:  Scale- Qty 1  Commander Flex - Qty. 1  Pulse-Oximeter - Qty. 1  Use as directed 1 Units 0     order for DME Equipment being ordered: DH2 shoe 1 Device 0     pantoprazole (PROTONIX) 40 MG EC tablet Take 40 mg by mouth daily       polyethylene glycol (MIRALAX) 17 GM/Dose powder Take 17 g by mouth daily as needed 510 g 0     sacubitril-valsartan (ENTRESTO)  MG per tablet Take 1 tablet by mouth 2 times daily 180 tablet 3     spironolactone (ALDACTONE) 25 MG tablet Take 1 tablet (25 mg) by mouth daily 90 tablet 3     thin (NO BRAND SPECIFIED) lancets 1 each 4 times daily Use with lanceting device. 100 each 1     traZODone (DESYREL) 50 MG tablet Take 50 mg by mouth At Bedtime       triamcinolone (KENALOG) 0.1 % external cream Apply topically to right stump, left hand and right forearm twice daily for rash       ULTICARE SHORT 31G X 8 MM insulin pen needle Inject 1 each Subcutaneous 4 times daily       venetoclax (VENCLEXTA) 100 MG tablet Take 200 mg by mouth every morning         ROS:   Constitutional: No fever, chills, or sweats. No weight gain/loss.   ENT: No visual disturbance, ear ache, epistaxis, sore throat.   Allergies/Immunologic: Negative.   Respiratory: No cough, hemoptysis.   Cardiovascular: As per HPI.   GI: No nausea, vomiting, hematemesis, melena, or hematochezia.   : No urinary frequency, dysuria, or hematuria.   Integument: Negative.   Psychiatric: Negative.   Neuro: Negative.   Endocrinology: Negative.   Musculoskeletal: Negative.    EXAM:  /45 (BP Location: Right  "arm, Patient Position: Sitting, Cuff Size: Adult Regular)   Pulse 66   Ht 1.801 m (5' 10.9\")   Wt 90.8 kg (200 lb 1.6 oz)   SpO2 99%   BMI 27.99 kg/m    General: appears comfortable, alert and articulate  Head: normocephalic, atraumatic  Eyes: anicteric sclera, EOMI  Neck: no adenopathy  Orophyarynx: moist mucosa, no lesions, dentition intact  Heart: regular, S1/S2, no murmur, gallop, rub, estimated JVP 8cm  Lungs: clear, no rales or wheezing  Abdomen: soft, non-tender, bowel sounds present, no hepatosplenomegaly  Extremities: no clubbing, cyanosis or edema  Neurological: normal speech and affect, no gross motor deficits    Labs:  CBC RESULTS:  Lab Results   Component Value Date    WBC 6.0 09/08/2022    WBC 3.7 (L) 06/26/2021    RBC 3.49 (L) 09/08/2022    RBC 3.09 (L) 06/26/2021    HGB 10.7 (L) 09/08/2022    HGB 9.6 (L) 06/26/2021    HCT 33.9 (L) 09/08/2022    HCT 28.6 (L) 06/26/2021    MCV 97 09/08/2022    MCV 93 06/26/2021    MCH 30.7 09/08/2022    MCH 31.1 06/26/2021    MCHC 31.6 09/08/2022    MCHC 33.6 06/26/2021    RDW 16.7 (H) 09/08/2022    RDW 14.6 06/26/2021     (L) 09/08/2022     (L) 06/26/2021       CMP RESULTS:  Lab Results   Component Value Date     10/26/2022     06/28/2021    POTASSIUM 4.2 10/26/2022    POTASSIUM 4.2 05/12/2022    POTASSIUM 4.1 06/28/2021    CHLORIDE 107 10/26/2022    CHLORIDE 105 05/12/2022    CHLORIDE 101 12/30/2021    CHLORIDE 109 06/28/2021    CO2 20 (L) 10/26/2022    CO2 20 (L) 05/12/2022    CO2 24 06/28/2021    ANIONGAP 14 10/26/2022    ANIONGAP 14 05/12/2022    ANIONGAP 4 06/28/2021     (H) 10/26/2022     (H) 05/12/2022     (H) 06/28/2021    BUN 33.2 (H) 10/26/2022    BUN 31 (H) 05/12/2022    BUN 43 (H) 06/28/2021    CR 1.93 (H) 10/26/2022    CR 1.33 (H) 06/28/2021    GFRESTIMATED 35 (L) 10/26/2022    GFRESTIMATED >60 07/08/2021    GFRESTIMATED 51 (L) 06/28/2021    GFRESTBLACK >60 07/08/2021    GFRESTBLACK 59 (L) 06/28/2021    " JUSTINO 9.8 10/26/2022    JUSTINO 9.4 06/28/2021    BILITOTAL 0.8 09/23/2021    BILITOTAL 0.9 06/20/2021    ALBUMIN 3.0 (L) 09/23/2021    ALBUMIN 3.3 (L) 06/20/2021    ALKPHOS 84 09/23/2021    ALKPHOS 72 06/20/2021    ALT 12 09/23/2021    ALT 24 06/20/2021    AST 12 09/23/2021    AST 34 06/20/2021        INR RESULTS:  Lab Results   Component Value Date    INR 1.05 04/16/2021       Lab Results   Component Value Date    MAG 1.8 09/25/2021    MAG 2.0 06/23/2021     Lab Results   Component Value Date    NTBNPI 7,364 (H) 06/20/2021     Lab Results   Component Value Date    NTBNP 2,136 (H) 04/14/2022    NTBNP 22,172 (H) 02/09/2018       Assessment and Plan:   1. Chronic systolic heart failure secondary to ICM with EF 35%.    Stage C  NYHA Class III  ACEi/ARB yes (ON max dose Entresto)  BB titration ongoing : Will increase BB (metoprolol to 200mg daily)   Aldosterone antagonist yes  SGLT2i: Yes  SCD prophylaxis pt seen by EP and is considering options  % BiV pacing: N/A  Fluid status euvolemic  NSAID use: no  Sleep Apnea Evaluation: n/a    2. CLL : ongoing therapy per oncology    3. CAD: S/P CAGB 1995; no anginal sx; on ASA/Statin/BB    4. PVD; S/P iliac stent    5. HTN: good control on current regime    6. DM: managed by PCP - On Jardiance    7. HLD: on statin     8. A.flutter: On Eliqouis/BB    9. CKD3:Multifactoral - HTN/DM/ICM:  Cr within normal range for patient.   Follow-up 1 month with [facility]    CC  Patient Care Team:  Gene Guevara MD as PCP - General  Verónica Jarquin, RN as Nurse Coordinator (Cardiology)  Sugey Levi APRN CNP as Nurse Practitioner (Cardiology)  Clarisse Valdes MD as MD (Cardiovascular Disease)  Taylor Arreola, RN as Specialty Care Coordinator (Cardiology)  Simenson, Liset L, RN as Specialty Care Coordinator  Cathi Gomez, RN as Specialty Care Coordinator (Cardiology)  Lindsay Shearer APRN CNP as Nurse Practitioner (Interventional Cardiology)  Hans Dial DPM as  Assigned Musculoskeletal Provider  Mathieu Lomas PA-C as Assigned Heart and Vascular Provider  Denny Ruiz, PharmD as Pharmacist (Pharmacist)  Ender Hendrickson OD (Optometry)  Ender Hendrickson OD as Assigned Surgical Provider  Denny Ruiz, PharmD as Assigned [place] Pharmacist  Esthela Jesus, RN as Specialty Care Coordinator (Cardiology)  Geoff Landeros MD as MD (Cardiovascular Disease)  MATHIEU LOMAS

## 2022-10-26 NOTE — PATIENT INSTRUCTIONS
Plan:   Consider an ICD and let us know if you'd like to proceed.      Your Care Team:  EP Cardiology   Telephone Number     Esthela Jesus RN [phone]    After business hours: [phone], ask for cardiologist on-call   Non-procedure scheduling:    Kenzie SHEARER   [phone]   Procedure scheduling:    Kati Plummer   [phone]   Device Clinic (Pacemakers, ICDs, Loop Recorders)    During business hours: [phone]  After business hours:   [phone]- select option 4 and ask for job code [id number].       [facility] Clinic:   [address] [place] [address]. [place] [address]      As always, Thank you for trusting us with your health care needs!

## 2022-10-26 NOTE — NURSING NOTE
Chief Complaint   Patient presents with     New Patient     ICD consult per [facility]. ICM, AFL, CAD sp CABG, PAD       Vitals were taken and medications were reconciled. EKG was performed   ASHKAN Kruse  3:37 PM

## 2022-10-26 NOTE — PROGRESS NOTES
ELECTROPHYSIOLOGY CLINIC VISIT    Assessment/Recommendations   Assessment/Plan:    Mr. Doyle is a 81 year old male who has a past medical history significant for CLL (ongoing treatment), CAD s/p CABG (LIMA-LAD, SVG-OM1, SVG-D1) in 1995, s/p PCI p-m LAD 2018, ICM LVEF 30-35%, PAF/AFL (CHADSVASC 6 on Eliquis), PVD s/p iliac artery stents and left lower extremity bypass, HTN, DM, HLD, critical limb ischemia s/p prosthesis on leg, osteomyelitis of right and left foot, and CKD III.    CAD s/p CABG X3 1995 s/p PCI 2018  ICM LVEF 30-35%, NYHA II-III  PAF/AFL:  1. ACEi/ARB: Continue Entresto.   2. BB: Continue Toprol XL.   3. Aldosterone antagonist: Continue Spironolactone.   4. Antiplatlet: Continue ASA  5. Statin: Continue Lipitor.  6. Nitroglycerin 0.4 mg PRN for chest pain. Place 1 tablet (0.4 mg) under the tongue every 5 minutes as needed for chest pain. Maximum of 3 doses in 15 minutes.  7. SCD prophylaxis: We discussed that he does have indication for ICD given reduced LVEF despite GDMT. We discussed there is no specific data of benefit of ICD for patients 80 or older with his comorbities. We discussed with the patient the rationale for ICD placement, alternative therapies,  technical aspects of the surgical procedure, risks/benefits of therapy and need for long-term follow-up in the Device Clinic.  The risk of defibrillator placement include: over sedation, reaction to local anesthetic, reaction to narcotics or benzodiazipines used for moderate secation, localized bleeding, internal bleeding, collapsed lung, and acute or late infections. There is the possibilty of unforseen complications as well such as device or lead failure, lead dislodgement, and inappropriate shocks from the defibrillator. An educational handout regarding ICD therapy was reviewed with the patient.  All question/concerns were addressed. After our discussion, the patient wishes to discuss with his family goals of care, but is leaning  towards defering ICD at this time. He will let us know if he decides to pursue.   8. Fluid status: Continue Torsemide.   9. AF/AFL: On Digoxin and Toprol XL for rate control, Eliquis for stroke prophylaxis. No recent AF episodes, will continue to monitor.   10. Lifestyle: Avoid tobacco, maintain a healthy weight, limit alcohol, cardiac diet, and exercise.    Follow up with EP on an as needed basis.        History of Present Illness/Subjective    Mr. Tad Manning is a 81 year old male who comes in today for EP consultation of consideration for ICD.    Mr. Doyle is a 81 year old male who has a past medical history significant for CLL (ongoing treatment), CAD s/p CABG (LIMA-LAD, SVG-OM1, SVG-D1) in 1995, s/p PCI p-m LAD 2018, ICM LVEF 30-35%, PAF/AFL (CHADSVASC 6 on Eliquis), PVD s/p iliac artery stents and left lower extremity bypass, HTN, DM, HLD, critical limb ischemia s/p prosthesis on leg, osteomyelitis of right and left foot, and CKD III.    He has known history of CAD. He underwent prior CABG in 1995. In 3/2018 which reportedly showed moderate disease in his RCA, severe LAD disease and patent grafts: he had two drug eluting stents placed in the proximal and mid LAD. Right sided catheterization done concomitantly showed elevated right and left sided pressures with type II pulmonary hypertension.In 7/2021, he had NSTEMI and underwent coronary angiogram which showed progression of his CAD with LM worse, new stenosis of the saphenous vein graft ported 70%.  Moderate stenosis of the left main, severe stenosis of the left anterior descending with mid LAD occlusion.  Distal LAD filling via patent LIMA.  Circumflex is occluded, diffuse disease in the RCA.  LIMA to the LAD was patent, vein graft to the OM is patent, vein graft to a diagonal was patent with a 70% stenosis.  It was reported to supply small diagonal branches. He has developed ICM. LVEF was 35-40% in 2018 and then decreased to 20-25%. He has been  on GDMT which has been up titrated. Most recent echo showed LVEF 30-35% in 8/2022. He had CLL 10 years ago and had 2 big rounds of chemo, reports now in remission.     He was referred to EP for consideration for ICD. He reports feeling at baseline. He denies chest discomfort, palpitations, abdominal fullness/bloating or peripheral edema, shortness of breath, paroxysmal nocturnal dyspnea, orthopnea, lightheadedness, dizziness, pre-syncope, or syncope. Presenting 12 lead ECG shows SR 1 AVB Vent Rate 67 bpm,  ms,  ms, QTc 429 ms. Current cardiac medications include: Lasix, Toprol XL, Jardiance, Spironolactone, Entresto, Eliquis, Digoxin, and Lipitor.         I have reviewed and updated the patient's Past Medical History, Social History, Family History and Medication List.     Cardiographics (Personally Reviewed) :   8/31/22 Echo:   Interpretation Summary  The Left ventricular function is moderately reduced. The visually estimated  ejection fraction is 30-35%  Global right ventricular function is borderline reduced.  Moderate left atrial enlargement is present.  No pericardial effusion is present.     This study was compared with the study from 06/20/2021. Ejection fraction has  improved slightly.  Pulmonary artery systolic pressure cannot be assessed.  The left ventricular size has improved.    6/22/21 Coronary Angiogram:    Dist LAD lesion is 40% stenosed.    Mid LM to Prox LAD lesion is 90% stenosed.    Mid LAD lesion is 100% stenosed.    Ost Cx to Dist Cx lesion is 100% stenosed.    Prox Graft lesion is 70% stenosed.    1st Diag lesion is 80% stenosed.    Lat 1st Diag lesion is 90% stenosed.     1.  Severe multivessel CAD s/p CABG.                LM has severe stenosis.                LAD has sever proximal stenosis and midLAD is occluded. Distal LAD fills via patent LIMA.                LCx is occluded.                 RCA has moderate diffuse disease.  2.  LIMA to LAD is patent.  Mid to distal LAD  "stents are patent.  3.  SVG to OM is patent.  4.  SVG to Diagonal is patent with a 70% stenosis and supplies diagonal branches are small and diffusely diseased.        Physical Examination   /45 (BP Location: Right arm, Patient Position: Sitting, Cuff Size: Adult Regular)   Pulse 66   Ht 1.801 m (5' 10.9\")   Wt 91 kg (200 lb 9.6 oz)   SpO2 99%   BMI 28.06 kg/m    Wt Readings from Last 3 Encounters:   08/31/22 86.8 kg (191 lb 4.8 oz)   05/02/22 89 kg (196 lb 1.6 oz)   04/14/22 89.8 kg (198 lb)     General Appearance:   Alert, well-appearing and in no acute distress.   HEENT: Atraumatic, normocephalic. PERRL.  MMM.   Chest/Lungs:   Respirations unlabored.  Lungs are clear to auscultation.   Cardiovascular:   Regular rate and rhythm.  S1/S2. No murmur.    Abdomen:  Soft, nontender, nondistended.   Extremities: No cyanosis or clubbing.    Musculoskeletal: Moves all extremities..   Skin: Warm, dry, intact.    Neurologic: Mood and affect are appropriate.  Alert and oriented to person, place, time, and situation.          Medications  Allergies   Current Outpatient Medications   Medication Sig Dispense Refill     acetaminophen (TYLENOL) 325 MG tablet Take 650 mg by mouth every 4 hours as needed Max 3gm per day for minor pain/fever       alcohol swab prep pads Use to swab area of injection/jina as directed. 100 each 3     apixaban ANTICOAGULANT (ELIQUIS ANTICOAGULANT) 5 MG tablet Take 1 tablet (5 mg) by mouth 2 times daily 180 tablet 3     aspirin (ASA) 81 MG chewable tablet Take 81 mg by mouth daily       atorvastatin (LIPITOR) 20 MG tablet Take 1 tablet (20 mg) by mouth daily 90 tablet 0     Mahsa Protect (EUCERIN) external cream Apply topically 2 times daily as needed for dry skin or other Apply as needed to intact stage 1 pressure area and/or irritated skin, rash or excoriation       bisacodyl (DULCOLAX) 10 MG suppository Place 10 mg rectally daily as needed for constipation If no BM or small BM for 3 days/9 " shifts       blood glucose (NO BRAND SPECIFIED) test strip Use to test blood sugar 4 times daily or as directed. 100 strip 6     blood glucose calibration (NO BRAND SPECIFIED) solution 1 drop every 14 days Use 1 drop to calibrate blood glucose monitor every 14 days 1 Bottle 3     blood glucose monitoring (NO BRAND SPECIFIED) meter device kit Use to test blood sugar 4 times daily or as directed. 1 kit 0     cholecalciferol 1000 UNITS TABS Take 1,000 Units by mouth daily 30 tablet      citalopram (CELEXA) 20 MG tablet Take 20 mg by mouth every morning        digoxin (LANOXIN) 125 MCG tablet Take 1 tablet (125 mcg) by mouth daily 90 tablet 3     empagliflozin (JARDIANCE) 10 MG TABS tablet Take 1 tablet (10 mg) by mouth daily 90 tablet 1     furosemide (LASIX) 20 MG tablet Take 1 tablet (20 mg) by mouth daily 90 tablet 3     insulin glargine (LANTUS PEN) 100 UNIT/ML pen Inject 50 Units Subcutaneous At Bedtime       insulin lispro (HUMALOG KWIKPEN) 100 UNIT/ML (1 unit dial) KWIKPEN Inject 16 Units Subcutaneous 2 times daily (before meals) Breakfast and lunch       insulin lispro (HUMALOG KWIKPEN) 100 UNIT/ML (1 unit dial) KWIKPEN Inject 18 Units Subcutaneous daily (with dinner)       loperamide (IMODIUM A-D) 2 MG tablet Take 2-4 mg by mouth 4 times daily as needed for diarrhea Take 4 mg after 1st loose stool, then take 1 tablet as needed after each loose stool       magnesium hydroxide (MILK OF MAGNESIA) 400 MG/5ML suspension Take 30 mLs by mouth daily as needed for constipation or heartburn If no BM/small BM for 3 days/9 shifts       metFORMIN (GLUCOPHAGE-XR) 750 MG 24 hr tablet Take 750 mg by mouth every morning       metoprolol succinate ER (TOPROL-XL) 100 MG 24 hr tablet Take 1.5 tablets (150 mg) by mouth daily 135 tablet 1     nitroglycerin (NITROSTAT) 0.4 MG sublingual tablet For chest pain place 1 tablet under the tongue every 5 minutes for 3 doses. If symptoms persist 5 minutes after 1st dose call 911. 25 tablet       nystatin (MYCOSTATIN) 612643 UNIT/GM external powder Apply topically 2 times daily as needed (to affected area(s) as needed)       order for DME Equipment to be ordered:  Scale- Qty 1  Commander Flex - Qty. 1  Pulse-Oximeter - Qty. 1  Use as directed 1 Units 0     order for DME Equipment being ordered: DH2 shoe 1 Device 0     pantoprazole (PROTONIX) 40 MG EC tablet Take 40 mg by mouth daily       polyethylene glycol (MIRALAX) 17 GM/Dose powder Take 17 g by mouth daily as needed (Patient not taking: Reported on 8/31/2022) 510 g 0     sacubitril-valsartan (ENTRESTO)  MG per tablet Take 1 tablet by mouth 2 times daily 180 tablet 3     spironolactone (ALDACTONE) 25 MG tablet Take 1 tablet (25 mg) by mouth daily 90 tablet 3     thin (NO BRAND SPECIFIED) lancets 1 each 4 times daily Use with lanceting device. 100 each 1     traZODone (DESYREL) 50 MG tablet Take 50 mg by mouth At Bedtime       triamcinolone (KENALOG) 0.1 % external cream Apply topically to right stump, left hand and right forearm twice daily for rash       ULTICARE SHORT 31G X 8 MM insulin pen needle Inject 1 each Subcutaneous 4 times daily       venetoclax (VENCLEXTA) 100 MG tablet Take 200 mg by mouth every morning      Allergies   Allergen Reactions     Blood Transfusion Related (Informational Only) Other (See Comments)     Patient has a history of a clinically significant antibody against RBC antigens.  A delay in compatible RBCs may occur.      Blood-Group Specific Substance Other (See Comments)     Patient has a suggestive Warm Auto-antibody. Blood products may be delayed. Draw patient 24 hours prior to transfusion. Draw one red top and two purple top tubes for all type and screen orders.         Lab Results (Personally Reviewed)    Chemistry/lipid CBC Cardiac Enzymes/BNP/TSH/INR   Lab Results   Component Value Date    BUN 30.6 (H) 09/08/2022     09/08/2022    CO2 22 09/08/2022     Creatinine   Date Value Ref Range Status   09/08/2022  1.74 (H) 0.67 - 1.17 mg/dL Final   06/28/2021 1.33 (H) 0.66 - 1.25 mg/dL Final       Lab Results   Component Value Date    CHOL 116 06/21/2021    HDL 38 (L) 06/21/2021    LDL 49 06/21/2021      Lab Results   Component Value Date    WBC 6.0 09/08/2022    HGB 10.7 (L) 09/08/2022    HCT 33.9 (L) 09/08/2022    MCV 97 09/08/2022     (L) 09/08/2022    Lab Results   Component Value Date    TROPONINI 0.06 09/22/2021    BNP 2,340 (H) 09/22/2021    TSH 1.11 10/03/2018    INR 1.05 04/16/2021        The patient states understanding and is agreeable with the plan.   Geoff Landeros MD [facility]  Cardiology - Electrophysiology        Total time spent on patient visit, reviewing notes, imaging, labs, orders, and completing necessary documentation: 60 minutes.

## 2022-10-27 NOTE — TELEPHONE ENCOUNTER
[facility] Call Center    Phone Message    May a detailed message be left on voicemail: yes     Reason for Call: Order(s): Other:   Reason for requested: new signed order for dosage change for metoprolol succinate ER (TOPROL XL) 100 MG 24 hr tablet  Date needed: ASAP  Provider name: Zeinab Mercer RN from [facility] requesting signed order for med change to be faxed to [phone]      Action Taken: Message routed to:  [facility] ([facility]): [email or web address]    Travel Screening: Not Applicable

## 2022-10-27 NOTE — TELEPHONE ENCOUNTER
Telephone Encounter by Liset Longo RN at 7/8/2021  3:51 PM     Author: Liset Longo RN Service: -- Author Type: Registered Nurse    Filed: 7/8/2021  3:53 PM Encounter Date: 7/8/2021 Status: Signed    : Liset Longo RN (Registered Nurse)       [facility] Nurse Triage Telephone Note      Provider: PARESH Padron  Facility: [facility] Type: TCU    Caller: Dai  Call Back Number:  [phone]    Allergies: Blood-group specific substance    Reason for call: Nurse called to report lab results.           C-Diff also negative.    Verbal Order/Direction given by Provider: No new orders.     Provider giving order: PARESH Padron    Verbal order given to: Dai Longo RN           
heart lung transplant/Intrauterine Device

## 2022-11-22 NOTE — ED TRIAGE NOTES
Patient arrived via EMS from [facility]. Staff went to check on patient and noticed that he had increased weakness. Reports this has been going on for the last two weeks. Shortness of breath and dyspnea on exertion. Temp was 103.4 PTA. 86% on room air. EMS placed patient on 6L of oxygen via nasal cannula.     Triage Assessment     Row Name 11/22/22 [id number]       Triage Assessment (Adult)    Airway WDL WDL       Respiratory WDL    Respiratory WDL X;rhythm/pattern;cough    Rhythm/Pattern, Respiratory shortness of breath;dyspnea on exertion    Cough Frequency infrequent    Cough Type nonproductive       Skin Circulation/Temperature WDL    Skin Circulation/Temperature WDL WDL       Cardiac WDL    Cardiac WDL WDL       Peripheral/Neurovascular WDL    Peripheral Neurovascular WDL WDL       Cognitive/Neuro/Behavioral WDL    Cognitive/Neuro/Behavioral WDL WDL

## 2022-11-22 NOTE — ED PROVIDER NOTES
Emergency Department Encounter     Evaluation Date & Time:   11/22/2022  5:26 PM    CHIEF COMPLAINT:  Shortness of Breath (/)      Triage Note:  Patient arrived via EMS from independent [facility] facility. Staff went to check on patient and noticed that he had increased weakness. Reports this has been going on for the last two weeks. Shortness of breath and dyspnea on exertion. Temp was 103.4 PTA. 86% on room air. EMS placed patient on 6L of oxygen via nasal cannula.               ED COURSE & MEDICAL DECISION MAKING:     ED Course as of 11/23/22 0029   Tue Nov 22, 2022 1842 CXR reviewed.    Labs still pending.  Will ultimately start on antibiotics, but be judicious with IVF given CHF history, especially with pt having elevated BP currently.   1931 WBC 5.6, Lactate 1.1.  Awaiting rest of labs including swabs.     2025 VBG pH 7.38.  Procal only 0.44.   2026 Flu A+.  Will start on tamiflu, hospitalize.   2040 Pt willing to transfer if bed available as we have none here.  Charge notified, will reach out to SOC and seek one out.   2111 Renal function similar to baseline.         Pt presenting from independent [facility] for evaluation of fatigue and hypoxia.  Someone checked on him today, found him very fatigued and hypoxic, EMS called and placed on 6L NC O2.  Pt appears fatigued, but no acute respiratory distress.  However, may ultimately need Bipap.  Certainly appears to be infected with fever and increased oxygen requirements, suspect Flu.  Pt denies any real signficant symptoms beyond some dyspnea, but seems a little limited historian. Will get labs including cultures, CXR, covid/flu testing and plan for hospitalization.    5:48 PM I met with the patient to gather history and to perform my initial exam. We discussed plans for the ED course, including diagnostic testing and treatment. PPE: N95 mask and gloves.   10:27 PM I discussed the case with hospitalist, Dr Lindquist, [facility], who accepts the  patient for transfer. Pt signed EMTALA.    At the conclusion of the encounter I discussed the results of all the tests and the disposition. The questions were answered. The patient or family acknowledged understanding and was agreeable with the care plan.      MEDICATIONS GIVEN IN THE EMERGENCY DEPARTMENT:  Medications   acetaminophen (TYLENOL) tablet 975 mg (975 mg Oral Given 11/22/22 1805)   ipratropium - albuterol 0.5 mg/2.5 mg/3 mL (DUONEB) neb solution 3 mL (3 mLs Nebulization Given 11/22/22 1805)   oseltamivir (TAMIFLU) capsule 30 mg (30 mg Oral Given 11/22/22 2102)       NEW PRESCRIPTIONS STARTED AT TODAY'S ED VISIT:  New Prescriptions    No medications on file       HPI   The history is provided by the patient and the EMS personnel. No  was used.        Tad Manning is a 82 year old male with a pertinent history of DM II, CAD, hypertension, hyperlipidemia, atrial fibrillation, chronic systolic heart failure, and chronic renal failure who presents to this ED by EMS for evaluation of shortness of breath.    Per triage note, [facility] from independent [facility] living after staff checked on him and found him to have increased generalized weakness. Patient was found to have low oxygen so was brought to the ED. Patient is a poor historian but states he feels mild shortness of breath. He believes he felt fine yesterday (11/21/22). Patient is not on oxygen normally, but was started on 6 liters by EMS as he was 86% on room air. Presently he is satting in the mid 90s on 6 liters. Patient denies any history of underlying lung issues. He denies any cough, fever, chills, chest pain, abdominal pain, nausea, vomiting, or diarrhea. No other complaints or concerns expressed at this time.     REVIEW OF SYSTEMS:  Review of Systems   Constitutional: Negative for chills and fever.   HENT: Negative for sore throat.    Eyes: Negative for visual disturbance.   Respiratory: Positive for shortness of  breath. Negative for cough.    Cardiovascular: Negative for chest pain.   Gastrointestinal: Negative for abdominal pain, diarrhea, nausea and vomiting.   Endocrine: Negative for polyuria.   Genitourinary: Negative for dysuria and hematuria.        - urinary changes     Musculoskeletal: Negative for joint swelling.   Skin: Negative for rash.   Neurological: Positive for weakness (generalized). Negative for dizziness.   Psychiatric/Behavioral: Negative for confusion.   All other systems reviewed and are negative.        Medical History     Past Medical History:   Diagnosis Date     ACS (acute coronary syndrome) (H) 6/20/2021     Acute respiratory failure with hypoxia (H) 4/6/2017     STORM (acute kidney injury) (H) 9/22/2018     Arthritis      ASCVD (arteriosclerotic cardiovascular disease)      Atrial fibrillation (H) 2/5/2018     Atrial flutter (H) 2/22/2017     BMI 30.0-30.9,adult      BPH (benign prostatic hypertrophy)      CAD S/P percutaneous coronary angioplasty 1/11/2014     Cellulitis      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)      Chronic systolic heart failure (H) 6/6/2017     Confusion 6/20/2021     Coronary artery disease      CRF (chronic renal failure), stage 3 (moderate) (H) 1/20/2017     Critical lower limb ischemia (H) 1/10/2014     Diabetes mellitus (H)      Diabetes mellitus, type 2 (H) 1/11/2014     Diabetic polyneuropathy (H)      Discitis 2/8/2017     Epidural abscess 2/9/2017     Fall 2/7/2017     History of blood transfusion      HTN (hypertension) 1/11/2014     Hyperglycemia 1/9/2020     Hyperlipidaemia      Hyperlipidemia with target LDL less than 100 1/11/2014     Hypertension      Long term current use of anticoagulant therapy 2/5/2018     Non-healing ulcer of foot (H)      Noninflammatory pericardial effusion      Osteomyelitis (H) 1/11/2014     Osteomyelitis of foot, right, acute (H) 2/15/2017     Osteomyelitis of left foot (H)      PVD (peripheral  vascular disease) (H)      Recurrent falls 6/20/2021     Sebaceous cyst        Past Surgical History:   Procedure Laterality Date     AMPUTATE LEG BELOW KNEE Right 10/8/2018    Procedure: AMPUTATE LEG BELOW KNEE;  OPEN RIGHT LOWER LEG AMPUTATION WITH WOUND VAC PLACEMENT    EBL: 50mL;  Surgeon: Amador Vick MD;  Location:  OR     AMPUTATE REVISION STUMP LOWER EXTREMITY Right 10/11/2018    Procedure: AMPUTATE REVISION STUMP LOWER EXTREMITY;  CLOSURE RIGHT BELOW KNEE AMPUTATION;  Surgeon: Amador Vick MD;  Location:  OR     AMPUTATE TOE(S)  1/10/2014    Procedure: AMPUTATE TOE(S);;  Surgeon: Amador Vick MD;  Location:  OR     APPLY WOUND VAC Right 10/8/2018    Procedure: APPLY WOUND VAC;;  Surgeon: Amador Vick MD;  Location:  OR     BONE MARROW BIOPSY, BONE SPECIMEN, NEEDLE/TROCAR  10/19/2012    Procedure: BIOPSY BONE MARROW;  BONE MARROW BIOPSY  (CONSCIOUS SED);  Surgeon: Ramon Quesada MD;  Location:  GI     BYPASS GRAFT FEMOROPOPLITEAL  1/10/2014    Procedure: BYPASS GRAFT FEMOROPOPLITEAL;  Left above knee popliteal to  Below knee popliteal bypass using transverse saphenous vein graft. Left 2nd Toe amputation;  Surgeon: Amador Vick MD;  Location:  OR     CARDIAC SURGERY      angioplasty with stent, triple bypass     CV ANGIOGRAM CORONARY GRAFT N/A 6/22/2021    Procedure: Angiogram Coronary Graft;  Surgeon: Sury Sorenson MD;  Location:  HEART CARDIAC CATH LAB     CV HEART CATHETERIZATION WITH POSSIBLE INTERVENTION N/A 6/22/2021    Procedure: Heart Catheterization with Possible Intervention;  Surgeon: Sury Sorenson MD;  Location:  HEART CARDIAC CATH LAB     EXCISE CYST GENERIC (LOCATION) N/A 2/1/2016    Procedure: EXCISE CYST GENERIC (LOCATION);  Surgeon: Amador Vick MD;  Location:  SD     GRAFT VEIN FROM EXTREMITY (LOCATION)  1/10/2014    Procedure: GRAFT VEIN FROM EXTREMITY (LOCATION);;  Surgeon: Amador Vick MD;  Location:  OR      LAMINECTOMY THORACIC ONE LEVEL N/A 2017    Procedure: LAMINECTOMY THORACIC ONE LEVEL;  Surgeon: Marcelo Trent MD;  Location: [facility] OR     OPTICAL TRACKING SYSTEM FUSION POSTERIOR SPINE THORACIC THREE+ LEVELS N/A 2017    Procedure: OPTICAL TRACKING SYSTEM FUSION POSTERIOR SPINE THORACIC THREE+ LEVELS;  Surgeon: Marcelo Trent MD;  Location: [facility] OR     OTHER SURGICAL HISTORY  2021    CV HEART CATH      OTHER SURGICAL HISTORY  2021    CV ANGIOGRAM CORONARY GRAFT     OTHER SURGICAL HISTORY  10/11/2018    AMPUTATE REVISION STUMP LOWER EXTREMITY     OTHER SURGICAL HISTORY  10/08/2018    APPLY WOUND VAC     OTHER SURGICAL HISTORY  10/08/2018    AMPUTATE LEG BELOW KNEE     OTHER SURGICAL HISTORY  2017    OPTICAL TRACKING SYSTEM FUSION POSTERIOR SPINE THORACIC THREE + LEVELS     TONSILLECTOMY       VASCULAR SURGERY      ptcr legs       Family History   Problem Relation Age of Onset     Cancer Mother         leukemia     Leukemia Mother        Social History     Tobacco Use     Smoking status: Former     Packs/day: 2.00     Years: 30.00     Pack years: 60.00     Types: Cigarettes     Quit date: 1995     Years since quittin.9     Smokeless tobacco: Never   Substance Use Topics     Alcohol use: No     Drug use: No       acetaminophen (TYLENOL) 325 MG tablet  apixaban ANTICOAGULANT (ELIQUIS ANTICOAGULANT) 5 MG tablet  aspirin (ASA) 81 MG chewable tablet  atorvastatin (LIPITOR) 20 MG tablet  [place] Protect (EUCERIN) external cream  bisacodyl (DULCOLAX) 10 MG suppository  cholecalciferol 1000 UNITS TABS  citalopram (CELEXA) 20 MG tablet  digoxin (LANOXIN) 125 MCG tablet  empagliflozin (JARDIANCE) 10 MG TABS tablet  furosemide (LASIX) 20 MG tablet  insulin glargine (LANTUS PEN) 100 UNIT/ML pen  insulin lispro (HUMALOG KWIKPEN) 100 UNIT/ML (1 unit dial) KWIKPEN  insulin lispro (HUMALOG KWIKPEN) 100 UNIT/ML (1 unit dial) KWIKPEN  loperamide (IMODIUM A-D) 2 MG tablet  magnesium hydroxide  "(MILK OF MAGNESIA) 400 MG/5ML suspension  metFORMIN (GLUCOPHAGE-XR) 750 MG 24 hr tablet  metoprolol succinate ER (TOPROL XL) 100 MG 24 hr tablet  nitroglycerin (NITROSTAT) 0.4 MG sublingual tablet  nystatin (MYCOSTATIN) 147335 UNIT/GM external powder  pantoprazole (PROTONIX) 40 MG EC tablet  polyethylene glycol (MIRALAX) 17 GM/Dose powder  sacubitril-valsartan (ENTRESTO)  MG per tablet  spironolactone (ALDACTONE) 25 MG tablet  traZODone (DESYREL) 50 MG tablet  triamcinolone (KENALOG) 0.1 % external cream  venetoclax (VENCLEXTA) 100 MG tablet  alcohol swab prep pads  blood glucose (NO BRAND SPECIFIED) test strip  blood glucose calibration (NO BRAND SPECIFIED) solution  blood glucose monitoring (NO BRAND SPECIFIED) meter device kit  order for DME  order for DME  thin (NO BRAND SPECIFIED) lancets  ULTICARE SHORT 31G X 8 MM insulin pen needle        Physical Exam     Vitals:  /58   Pulse 67   Temp (!) 102.8  F (39.3  C) (Oral)   Resp 27   Ht 1.791 m (5' 10.5\")   Wt 87.5 kg (193 lb)   SpO2 99%   BMI 27.30 kg/m      PHYSICAL EXAM:   Physical Exam  Vitals and nursing note reviewed.   Constitutional:       General: He is not in acute distress.     Appearance: Normal appearance.      Comments: Fatigued, elderly, and frail appearing   HENT:      Head: Normocephalic and atraumatic.      Nose: Nose normal.      Mouth/Throat:      Mouth: Mucous membranes are moist.   Eyes:      Pupils: Pupils are equal, round, and reactive to light.   Neck:      Vascular: No JVD.   Cardiovascular:      Rate and Rhythm: Normal rate and regular rhythm.      Pulses: Normal pulses.           Radial pulses are 2+ on the right side and 2+ on the left side.        Popliteal pulses are 2+ on the right side and 2+ on the left side.        Dorsalis pedis pulses are 2+ on the left side.   Pulmonary:      Effort: Pulmonary effort is normal. No respiratory distress.      Comments: Coarse breath sounds bilaterally  Abdominal:      " Palpations: Abdomen is soft.      Tenderness: There is no abdominal tenderness.   Musculoskeletal:      Cervical back: Full passive range of motion without pain and neck supple.      Comments: No calf tenderness or swelling b/l      Right Lower Extremity: Right leg is amputated below knee.   Skin:     General: Skin is warm.      Findings: No rash.      Comments: Warm to the touch   Neurological:      General: No focal deficit present.      Mental Status: He is alert. Mental status is at baseline.      Comments: Fluent speech, no acute lateralizing deficits   Psychiatric:         Mood and Affect: Mood normal.         Behavior: Behavior normal.         Results     LAB:  All pertinent labs reviewed and interpreted  Labs Ordered and Resulted from Time of ED Arrival to Time of ED Departure   BASIC METABOLIC PANEL - Abnormal       Result Value    Sodium 134 (*)     Potassium 3.8      Chloride 107      Carbon Dioxide (CO2) 16 (*)     Anion Gap 11      Urea Nitrogen 34 (*)     Creatinine 2.00 (*)     Calcium 8.5      Glucose 150 (*)     GFR Estimate 33 (*)    MAGNESIUM - Abnormal    Magnesium 1.6 (*)    HEPATIC FUNCTION PANEL - Abnormal    Bilirubin Total 0.7      Bilirubin Direct 0.3      Protein Total 6.3      Albumin 3.3 (*)     Alkaline Phosphatase 66      AST 39      ALT <9     CRP INFLAMMATION - Abnormal    CRP 8.5 (*)    INFLUENZA A/B & SARS-COV2 PCR MULTIPLEX - Abnormal    Influenza A PCR Positive (*)     Influenza B PCR Negative      RSV PCR Negative      SARS CoV2 PCR Negative     BLOOD GAS VENOUS - Abnormal    pH Venous 7.38      pCO2 Venous 31 (*)     pO2 Venous 45      Bicarbonate Venous 19 (*)     Base Excess/Deficit (+/-) -6.4      Oxyhemoglobin Venous 78.8 (*)     O2 Sat, Venous 80.3 (*)    CBC WITH PLATELETS AND DIFFERENTIAL - Abnormal    WBC Count 5.6      RBC Count 3.33 (*)     Hemoglobin 10.3 (*)     Hematocrit 31.5 (*)     MCV 95      MCH 30.9      MCHC 32.7      RDW 15.9 (*)     Platelet Count 89 (*)      % Neutrophils 77      % Lymphocytes 7      % Monocytes 16      % Eosinophils 0      % Basophils 0      % Immature Granulocytes 0      NRBCs per 100 WBC 0      Absolute Neutrophils 4.4      Absolute Lymphocytes 0.4 (*)     Absolute Monocytes 0.9      Absolute Eosinophils 0.0      Absolute Basophils 0.0      Absolute Immature Granulocytes 0.0      Absolute NRBCs 0.0     GLUCOSE BY METER - Abnormal    GLUCOSE BY METER POCT 124 (*)    LACTIC ACID WHOLE BLOOD - Normal    Lactic Acid 1.1     PROCALCITONIN - Normal    Procalcitonin 0.44     ROUTINE UA WITH MICROSCOPIC REFLEX TO CULTURE   BLOOD CULTURE   BLOOD CULTURE       RADIOLOGY:  XR Chest Port 1 View   Final Result   IMPRESSION: Since 9/22/2021 there has been a slight increase in pulmonary vascularity and heart size which is at the upper limits of normal, raising the possibility for borderline findings of active CHF. There has been an increase in the amount of    abnormal increased density involving the right infrahilar region extending to the right lung base. A CT of the chest would be helpful for further evaluation. Otherwise stable with again seen prior postoperative change with no complications seen.    Partially calcified thoracic aorta. Mild scattered degenerative changes bony skeleton.                    ECG:  none    PROCEDURES:  Procedures:  none      FINAL IMPRESSION:    ICD-10-CM    1. Influenza A  J10.1       2. Hypoxia  R09.02       3. Chronic kidney disease, unspecified CKD stage  N18.9           CRITICAL CARE  45 minutes of critical care time spent managing hypoxia with influenza A. Time spent interpreting labs, imaging, documenting, speaking with pt and accepting hospitalist.  Independent of any procedures performed.      I, Temi Jiménez, am serving as a scribe to document services personally performed by Dr. Norberto Katz, based on my observations and the provider's statements to me. I, Norberto Katz, DO attest that Temi Jiménez is acting in a  richa borja, has observed my performance of the services and has documented them in accordance with my direction.      Norberto Katz DO  Emergency Medicine  [facility] EMERGENCY ROOM  11/22/2022  5:47 PM        Norberto Katz MD  11/23/22 0029

## 2022-11-23 NOTE — ED NOTES
Patient seen for dysphagia follow up. Chart reviewed to date.     PMH of HTN, HLD, transferred from [facility] for NSx eval for large L.  IPH. Patient presented with severe headache with subsequent syncopal episode. Minimally responsive and intubated for airway protection. Was HTN 170s, started on Caredene and prop gtt. Cardene discontinued prior to transfer. Upon arrival, with sedation held she was unable to open her eyes, not following commands, moving spontaneously on the L, RUE loc, RLE WDs. After extensive discussion with the family regarding goals of care, Pt brought to OR for emergent clot evacuation. Pt initially assessed by SLP on 5/14 with recommendations for NPO, with non-oral nutrition/hydration/medications. At that time, Pt presented with "1) oral and suspected pharyngeal dysphagia superimposed upon reduced orientation to feeding task. SLP provided minimal amount of crushed ice to Pt lateral sulcus due to poor oral grading to utensil. Pt presented with delayed oral manipulation, increased oral transit time, suspected delayed pharyngeal swallow with delayed congested cough."    Today, Pt received upright in bed on room air. +NGT. Pt non-verbal, unable to assess orientation. Unable to follow directives. However, Pt did follow visual cues to open oral cavity and stick out tongue. White film observed on lingual surface- MD please assess. SLP provided 1/3 teaspoon crushed ice chips, 1/2 teaspoon honey thick liquids, and 1/2 teaspoon thin puree x3. Pt presented with 1) Oral and suspected pharyngeal dysphagia characterized by right lingual/labial weakness resulting in oral residue in L lateral sulcus, suspected delayed pharyngeal swallow, reduced laryngeal elevation on palpation, multiple swallows per bolus, and immediate cough response with ice and honey thick liquids.     -Purposeful proactive rounding reinforced and 5 Ps addressed. Pt left in no distress. NP Ruthann made aware.     -Recommendations: 1) Continue NPO, with non-oral nutrition/hydration/medications. 2) Assess white film on lingual surface 3) Maintain adequate oral hygiene. 4) SLP to follow up with clinical bedside exam on 5/20 to assess candidacy for objective swallow study vs subjective diet advancement.     Susan Prieto MS CCC-SLP  Speech-Language Pathologist  ; [phone] Pt prosthetic and prosthetic padding sent with EMS. Also, personal effects and phone sent. Elizabet called to update.    Patient seen for dysphagia follow up. Chart reviewed to date.     PMH of HTN, HLD, transferred from [facility] for NSx eval for large L.  IPH. Patient presented with severe headache with subsequent syncopal episode. Minimally responsive and intubated for airway protection. Was HTN 170s, started on Caredene and prop gtt. Cardene discontinued prior to transfer. Upon arrival, with sedation held she was unable to open her eyes, not following commands, moving spontaneously on the L, RUE loc, RLE WDs. After extensive discussion with the family regarding goals of care, Pt brought to OR for emergent clot evacuation. 5/13: afib with RVR status post diltiazem gtt with moderate response. 5/14: started on amiodarone.   Pt initially assessed by SLP on 5/14 with recommendations for NPO, with non-oral nutrition/hydration/medications. At that time, Pt presented with "1) oral and suspected pharyngeal dysphagia superimposed upon reduced orientation to feeding task. SLP provided minimal amount of crushed ice to Pt lateral sulcus due to poor oral grading to utensil. Pt presented with delayed oral manipulation, increased oral transit time, suspected delayed pharyngeal swallow with delayed congested cough."    Today, Pt received upright in bed on room air. +NGT. Pt non-verbal, unable to assess orientation. Unable to follow directives. However, Pt did follow visual cues to open oral cavity and stick out tongue. White film observed on lingual surface- MD please assess. SLP provided 1/3 teaspoon crushed ice chips, 1/2 teaspoon honey thick liquids, and 1/2 teaspoon thin puree x3. Pt presented with 1) Oral and suspected pharyngeal dysphagia characterized by right lingual/labial weakness resulting in oral residue in L lateral sulcus, suspected delayed pharyngeal swallow, reduced laryngeal elevation on palpation, multiple swallows per bolus, and immediate cough response with ice and honey thick liquids.     -Purposeful proactive rounding reinforced and 5 Ps addressed. Pt left in no distress. NP Ruthann made aware.     -Recommendations: 1) Continue NPO, with non-oral nutrition/hydration/medications. 2) Assess white film on lingual surface 3) Maintain adequate oral hygiene. 4) SLP to follow up with clinical bedside exam on 5/20 to assess candidacy for objective swallow study vs subjective diet advancement.     Susan Prieto MS CCC-SLP  Speech-Language Pathologist  ; [phone]

## 2022-11-23 NOTE — PHARMACY-ADMISSION MEDICATION HISTORY
Pharmacy Note - Admission Medication History    Pertinent Provider Information:     Spoke with RN from [facility]. They were unable to fax patient's medication list due to computer issue on their end. She went through patient's medication list and MAR with me as they administer patient's medications for him. She stated he hasn't had any Humalog with meals today as he wasn't eating much and his blood sugars were low.  ______________________________________________________________________    Prior To Admission (PTA) med list completed and updated in EMR.       PTA Med List   Medication Sig Last Dose     acetaminophen (TYLENOL) 325 MG tablet Take 650 mg by mouth every 4 hours as needed Max 3gm per day for minor pain/fever      apixaban ANTICOAGULANT (ELIQUIS ANTICOAGULANT) 5 MG tablet Take 1 tablet (5 mg) by mouth 2 times daily 11/22/2022 at x 1     aspirin (ASA) 81 MG chewable tablet Take 81 mg by mouth daily 11/22/2022 at AM     atorvastatin (LIPITOR) 20 MG tablet Take 1 tablet (20 mg) by mouth daily 11/22/2022 at AM     Mahsa Protect (EUCERIN) external cream Apply topically 2 times daily as needed for dry skin or other Apply as needed to intact stage 1 pressure area and/or irritated skin, rash or excoriation. Apply to legs.      bisacodyl (DULCOLAX) 10 MG suppository Place 10 mg rectally daily as needed for constipation If no BM or small BM for 3 days/9 shifts      cholecalciferol 1000 UNITS TABS Take 1,000 Units by mouth daily 11/22/2022 at AM     citalopram (CELEXA) 20 MG tablet Take 20 mg by mouth every morning  11/22/2022 at AM     digoxin (LANOXIN) 125 MCG tablet Take 1 tablet (125 mcg) by mouth daily 11/22/2022 at AM     empagliflozin (JARDIANCE) 10 MG TABS tablet Take 1 tablet (10 mg) by mouth daily 11/22/2022 at AM     furosemide (LASIX) 20 MG tablet Take 1 tablet (20 mg) by mouth daily 11/22/2022 at AM     insulin glargine (LANTUS PEN) 100 UNIT/ML pen Inject 50 Units Subcutaneous At Bedtime  11/21/2022     insulin lispro (HUMALOG KWIKPEN) 100 UNIT/ML (1 unit dial) KWIKPEN Inject 16 Units Subcutaneous 2 times daily (before meals) Breakfast and lunch 11/21/2022     insulin lispro (HUMALOG KWIKPEN) 100 UNIT/ML (1 unit dial) KWIKPEN Inject 18 Units Subcutaneous daily (with dinner) 11/21/2022     loperamide (IMODIUM A-D) 2 MG tablet Take 2-4 mg by mouth 4 times daily as needed for diarrhea Take 4 mg after 1st loose stool, then take 1 tablet as needed after each loose stool      magnesium hydroxide (MILK OF MAGNESIA) 400 MG/5ML suspension Take 30 mLs by mouth daily as needed for constipation or heartburn If no BM/small BM for 3 days/9 shifts      metFORMIN (GLUCOPHAGE-XR) 750 MG 24 hr tablet Take 750 mg by mouth every morning 11/22/2022     metoprolol succinate ER (TOPROL XL) 100 MG 24 hr tablet Take 2 tablets (200 mg) by mouth daily 11/22/2022 at AM     nitroglycerin (NITROSTAT) 0.4 MG sublingual tablet For chest pain place 1 tablet under the tongue every 5 minutes for 3 doses. If symptoms persist 5 minutes after 1st dose call 911.      nystatin (MYCOSTATIN) 055664 UNIT/GM external powder Apply topically 2 times daily as needed (to affected area(s) as needed)      pantoprazole (PROTONIX) 40 MG EC tablet Take 40 mg by mouth daily 11/22/2022 at AM     polyethylene glycol (MIRALAX) 17 GM/Dose powder Take 17 g by mouth daily as needed      sacubitril-valsartan (ENTRESTO)  MG per tablet Take 1 tablet by mouth 2 times daily 11/22/2022 at x 1     spironolactone (ALDACTONE) 25 MG tablet Take 1 tablet (25 mg) by mouth daily 11/22/2022 at AM     traZODone (DESYREL) 50 MG tablet Take 50 mg by mouth At Bedtime 11/21/2022     triamcinolone (KENALOG) 0.1 % external cream 2 times daily as needed Apply topically to right stump, left hand and right forearm twice daily as needed for rash      venetoclax (VENCLEXTA) 100 MG tablet Take 200 mg by mouth every morning 11/22/2022     Information source(s): Caregiver, Clinic  records, Hospital records, Facility ([facility]/) medication list/MAR and CareEverywhere/SureScripts  Method of interview communication: phone    Summary of Changes to PTA Med List  New: None  Discontinued: None  Changed: None    Patient was asked about OTC/herbal products specifically.  PTA med list reflects this.    In the past week, patient estimated taking medication this percent of the time:  greater than 90%.      Patient did not bring any medications to the hospital and can't retrieve from home. No multi-dose medications are available for use during hospital stay.     The information provided in this note is only as accurate as the sources available at the time of the update(s).    Thank you for the opportunity to participate in the care of this patient.    Cherie Castillo, PharmD, BCPS  11/22/2022 7:36 PM

## 2022-11-23 NOTE — ED NOTES
Pt desating down to high 80's. Pt switched to oxymask as he is a mouth breather. Pulse ox switched to another finger. Oxygen increased from 4L to 5L. SPO2 92-93 on 5L

## 2023-01-01 ENCOUNTER — LAB REQUISITION (OUTPATIENT)
Dept: LAB | Facility: CLINIC | Age: 83
End: 2023-01-01
Payer: COMMERCIAL

## 2023-01-01 ENCOUNTER — HOSPITAL ENCOUNTER (EMERGENCY)
Facility: CLINIC | Age: 83
Discharge: HOME | End: 2023-06-09
Attending: EMERGENCY MEDICINE | Admitting: EMERGENCY MEDICINE
Payer: COMMERCIAL

## 2023-01-01 ENCOUNTER — PATIENT OUTREACH (OUTPATIENT)
Dept: CARDIOLOGY | Facility: CLINIC | Age: 83
End: 2023-01-01
Payer: COMMERCIAL

## 2023-01-01 ENCOUNTER — CARE COORDINATION (OUTPATIENT)
Dept: CARDIOLOGY | Facility: CLINIC | Age: 83
End: 2023-01-01
Payer: COMMERCIAL

## 2023-01-01 ENCOUNTER — ANCILLARY PROCEDURE (OUTPATIENT)
Dept: CARDIOLOGY | Facility: CLINIC | Age: 83
End: 2023-01-01
Attending: INTERNAL MEDICINE
Payer: COMMERCIAL

## 2023-01-01 ENCOUNTER — TELEPHONE (OUTPATIENT)
Dept: CARDIOLOGY | Facility: CLINIC | Age: 83
End: 2023-01-01
Payer: COMMERCIAL

## 2023-01-01 ENCOUNTER — LAB (OUTPATIENT)
Dept: LAB | Facility: CLINIC | Age: 83
End: 2023-01-01
Payer: COMMERCIAL

## 2023-01-01 ENCOUNTER — OFFICE VISIT (OUTPATIENT)
Dept: CARDIOLOGY | Facility: CLINIC | Age: 83
End: 2023-01-01
Attending: PHYSICIAN ASSISTANT
Payer: COMMERCIAL

## 2023-01-01 ENCOUNTER — ANCILLARY ORDERS (OUTPATIENT)
Dept: CARDIOLOGY | Facility: CLINIC | Age: 83
End: 2023-01-01

## 2023-01-01 ENCOUNTER — MYC MEDICAL ADVICE (OUTPATIENT)
Dept: CARDIOLOGY | Facility: CLINIC | Age: 83
End: 2023-01-01
Payer: COMMERCIAL

## 2023-01-01 ENCOUNTER — DOCUMENTATION ONLY (OUTPATIENT)
Dept: OTHER | Facility: CLINIC | Age: 83
End: 2023-01-01
Payer: COMMERCIAL

## 2023-01-01 ENCOUNTER — APPOINTMENT (OUTPATIENT)
Dept: RADIOLOGY | Facility: CLINIC | Age: 83
End: 2023-01-01
Attending: EMERGENCY MEDICINE
Payer: COMMERCIAL

## 2023-01-01 ENCOUNTER — OFFICE VISIT (OUTPATIENT)
Dept: CARDIOLOGY | Facility: CLINIC | Age: 83
End: 2023-01-01
Attending: NURSE PRACTITIONER
Payer: COMMERCIAL

## 2023-01-01 ENCOUNTER — HEALTH MAINTENANCE LETTER (OUTPATIENT)
Age: 83
End: 2023-01-01

## 2023-01-01 VITALS
DIASTOLIC BLOOD PRESSURE: 45 MMHG | OXYGEN SATURATION: 100 % | WEIGHT: 193.5 LBS | SYSTOLIC BLOOD PRESSURE: 112 MMHG | HEART RATE: 80 BPM | HEIGHT: 70 IN | BODY MASS INDEX: 27.7 KG/M2

## 2023-01-01 VITALS
RESPIRATION RATE: 31 BRPM | SYSTOLIC BLOOD PRESSURE: 151 MMHG | HEIGHT: 72 IN | OXYGEN SATURATION: 99 % | HEART RATE: 86 BPM | TEMPERATURE: 97.9 F | WEIGHT: 185 LBS | DIASTOLIC BLOOD PRESSURE: 88 MMHG | BODY MASS INDEX: 25.06 KG/M2

## 2023-01-01 VITALS
DIASTOLIC BLOOD PRESSURE: 68 MMHG | BODY MASS INDEX: 26.61 KG/M2 | SYSTOLIC BLOOD PRESSURE: 110 MMHG | HEART RATE: 85 BPM | WEIGHT: 196.2 LBS | OXYGEN SATURATION: 100 %

## 2023-01-01 DIAGNOSIS — I50.9 HEART FAILURE, UNSPECIFIED (H): ICD-10-CM

## 2023-01-01 DIAGNOSIS — C91.10 CHRONIC LYMPHOCYTIC LEUKEMIA OF B-CELL TYPE NOT HAVING ACHIEVED REMISSION (H): ICD-10-CM

## 2023-01-01 DIAGNOSIS — I48.92 ATRIAL FIBRILLATION AND FLUTTER (H): ICD-10-CM

## 2023-01-01 DIAGNOSIS — I50.22 CHRONIC SYSTOLIC HEART FAILURE (H): ICD-10-CM

## 2023-01-01 DIAGNOSIS — C91.11 CHRONIC LYMPHOCYTIC LEUKEMIA OF B-CELL TYPE IN REMISSION (H): ICD-10-CM

## 2023-01-01 DIAGNOSIS — I50.9 ACUTE ON CHRONIC CONGESTIVE HEART FAILURE, UNSPECIFIED HEART FAILURE TYPE (H): ICD-10-CM

## 2023-01-01 DIAGNOSIS — I50.22 CHRONIC SYSTOLIC (CONGESTIVE) HEART FAILURE (H): ICD-10-CM

## 2023-01-01 DIAGNOSIS — I50.22 CHRONIC SYSTOLIC HEART FAILURE (H): Primary | ICD-10-CM

## 2023-01-01 DIAGNOSIS — I48.91 ATRIAL FIBRILLATION AND FLUTTER (H): ICD-10-CM

## 2023-01-01 DIAGNOSIS — I48.0 PAROXYSMAL A-FIB (H): ICD-10-CM

## 2023-01-01 DIAGNOSIS — E11.51 TYPE 2 DIABETES MELLITUS WITH DIABETIC PERIPHERAL ANGIOPATHY WITHOUT GANGRENE (H): ICD-10-CM

## 2023-01-01 DIAGNOSIS — R53.1 GENERALIZED WEAKNESS: ICD-10-CM

## 2023-01-01 DIAGNOSIS — Z86.79 HISTORY OF CHF (CONGESTIVE HEART FAILURE): ICD-10-CM

## 2023-01-01 DIAGNOSIS — N18.32 CHRONIC KIDNEY DISEASE, STAGE 3B (H): ICD-10-CM

## 2023-01-01 LAB
ABO/RH(D): NORMAL
ACANTHOCYTES BLD QL SMEAR: ABNORMAL
ALBUMIN SERPL-MCNC: 3.5 G/DL (ref 3.5–5)
ALBUMIN UR-MCNC: 20 MG/DL
ALP SERPL-CCNC: 93 U/L (ref 45–120)
ALT SERPL W P-5'-P-CCNC: 12 U/L (ref 0–45)
ANION GAP SERPL CALCULATED.3IONS-SCNC: 12 MMOL/L (ref 7–15)
ANION GAP SERPL CALCULATED.3IONS-SCNC: 14 MMOL/L (ref 7–15)
ANION GAP SERPL CALCULATED.3IONS-SCNC: 15 MMOL/L (ref 7–15)
ANION GAP SERPL CALCULATED.3IONS-SCNC: 15 MMOL/L (ref 7–15)
ANION GAP SERPL CALCULATED.3IONS-SCNC: 17 MMOL/L (ref 7–15)
ANION GAP SERPL CALCULATED.3IONS-SCNC: 8 MMOL/L (ref 5–18)
ANTIBODY SCREEN: NEGATIVE
APPEARANCE UR: CLEAR
AST SERPL W P-5'-P-CCNC: 7 U/L (ref 0–40)
ATRIAL RATE - MUSE: 78 BPM
BASOPHILS # BLD AUTO: 0 10E3/UL (ref 0–0.2)
BASOPHILS NFR BLD AUTO: 1 %
BILIRUB DIRECT SERPL-MCNC: 0.4 MG/DL
BILIRUB SERPL-MCNC: 1.4 MG/DL (ref 0–1)
BILIRUB UR QL STRIP: NEGATIVE
BNP SERPL-MCNC: 3772 PG/ML (ref 0–86)
BUN SERPL-MCNC: 31.5 MG/DL (ref 8–23)
BUN SERPL-MCNC: 36 MG/DL (ref 8–23)
BUN SERPL-MCNC: 36 MG/DL (ref 8–28)
BUN SERPL-MCNC: 37.2 MG/DL (ref 8–23)
BUN SERPL-MCNC: 42.4 MG/DL (ref 8–23)
BUN SERPL-MCNC: 43.5 MG/DL (ref 8–23)
BUN SERPL-MCNC: 44 MG/DL (ref 8–23)
BUN SERPL-MCNC: 47 MG/DL (ref 8–23)
BUN SERPL-MCNC: 48.5 MG/DL (ref 8–23)
BURR CELLS BLD QL SMEAR: ABNORMAL
C REACTIVE PROTEIN LHE: 1 MG/DL (ref 0–?)
CALCIUM SERPL-MCNC: 8.9 MG/DL (ref 8.8–10.2)
CALCIUM SERPL-MCNC: 9 MG/DL (ref 8.8–10.2)
CALCIUM SERPL-MCNC: 9.2 MG/DL (ref 8.8–10.2)
CALCIUM SERPL-MCNC: 9.4 MG/DL (ref 8.8–10.2)
CALCIUM SERPL-MCNC: 9.5 MG/DL (ref 8.8–10.2)
CALCIUM SERPL-MCNC: 9.6 MG/DL (ref 8.5–10.5)
CALCIUM SERPL-MCNC: 9.6 MG/DL (ref 8.8–10.2)
CHLORIDE BLD-SCNC: 109 MMOL/L (ref 98–107)
CHLORIDE SERPL-SCNC: 101 MMOL/L (ref 98–107)
CHLORIDE SERPL-SCNC: 102 MMOL/L (ref 98–107)
CHLORIDE SERPL-SCNC: 103 MMOL/L (ref 98–107)
CHLORIDE SERPL-SCNC: 103 MMOL/L (ref 98–107)
CHLORIDE SERPL-SCNC: 105 MMOL/L (ref 98–107)
CHLORIDE SERPL-SCNC: 105 MMOL/L (ref 98–107)
CHLORIDE SERPL-SCNC: 106 MMOL/L (ref 98–107)
CHLORIDE SERPL-SCNC: 107 MMOL/L (ref 98–107)
CO2 SERPL-SCNC: 23 MMOL/L (ref 22–31)
COLOR UR AUTO: ABNORMAL
CREAT SERPL-MCNC: 1.72 MG/DL (ref 0.67–1.17)
CREAT SERPL-MCNC: 1.77 MG/DL (ref 0.67–1.17)
CREAT SERPL-MCNC: 1.91 MG/DL (ref 0.67–1.17)
CREAT SERPL-MCNC: 1.99 MG/DL (ref 0.67–1.17)
CREAT SERPL-MCNC: 1.99 MG/DL (ref 0.67–1.17)
CREAT SERPL-MCNC: 2.04 MG/DL (ref 0.67–1.17)
CREAT SERPL-MCNC: 2.07 MG/DL (ref 0.7–1.3)
CREAT SERPL-MCNC: 2.1 MG/DL (ref 0.67–1.17)
CREAT SERPL-MCNC: 2.12 MG/DL (ref 0.67–1.17)
DEPRECATED HCO3 PLAS-SCNC: 16 MMOL/L (ref 22–29)
DEPRECATED HCO3 PLAS-SCNC: 16 MMOL/L (ref 22–29)
DEPRECATED HCO3 PLAS-SCNC: 18 MMOL/L (ref 22–29)
DEPRECATED HCO3 PLAS-SCNC: 20 MMOL/L (ref 22–29)
DEPRECATED HCO3 PLAS-SCNC: 20 MMOL/L (ref 22–29)
DEPRECATED HCO3 PLAS-SCNC: 21 MMOL/L (ref 22–29)
DEPRECATED HCO3 PLAS-SCNC: 22 MMOL/L (ref 22–29)
DEPRECATED HCO3 PLAS-SCNC: 22 MMOL/L (ref 22–29)
DIASTOLIC BLOOD PRESSURE - MUSE: 55 MMHG
ELLIPTOCYTES BLD QL SMEAR: ABNORMAL
ELLIPTOCYTES BLD QL SMEAR: ABNORMAL
ELLIPTOCYTES BLD QL SMEAR: SLIGHT
EOSINOPHIL # BLD AUTO: 0 10E3/UL (ref 0–0.7)
EOSINOPHIL # BLD AUTO: 0.1 10E3/UL (ref 0–0.7)
EOSINOPHIL NFR BLD AUTO: 0 %
EOSINOPHIL NFR BLD AUTO: 1 %
EOSINOPHIL NFR BLD AUTO: 2 %
ERYTHROCYTE [DISTWIDTH] IN BLOOD BY AUTOMATED COUNT: 14.8 % (ref 10–15)
ERYTHROCYTE [DISTWIDTH] IN BLOOD BY AUTOMATED COUNT: 25.3 % (ref 10–15)
ERYTHROCYTE [DISTWIDTH] IN BLOOD BY AUTOMATED COUNT: 26.6 % (ref 10–15)
ERYTHROCYTE [DISTWIDTH] IN BLOOD BY AUTOMATED COUNT: 27.8 % (ref 10–15)
ERYTHROCYTE [DISTWIDTH] IN BLOOD BY AUTOMATED COUNT: 28.1 % (ref 10–15)
ERYTHROCYTE [DISTWIDTH] IN BLOOD BY AUTOMATED COUNT: 28.3 % (ref 10–15)
ERYTHROCYTE [DISTWIDTH] IN BLOOD BY AUTOMATED COUNT: 31.2 % (ref 10–15)
FLUAV RNA SPEC QL NAA+PROBE: NEGATIVE
FLUBV RNA RESP QL NAA+PROBE: NEGATIVE
FRAGMENTS BLD QL SMEAR: ABNORMAL
FRAGMENTS BLD QL SMEAR: ABNORMAL
FRAGMENTS BLD QL SMEAR: SLIGHT
GFR SERPL CREATININE-BSD FRML MDRD: 31 ML/MIN/1.73M2
GFR SERPL CREATININE-BSD FRML MDRD: 31 ML/MIN/1.73M2
GFR SERPL CREATININE-BSD FRML MDRD: 32 ML/MIN/1.73M2
GFR SERPL CREATININE-BSD FRML MDRD: 32 ML/MIN/1.73M2
GFR SERPL CREATININE-BSD FRML MDRD: 33 ML/MIN/1.73M2
GFR SERPL CREATININE-BSD FRML MDRD: 33 ML/MIN/1.73M2
GFR SERPL CREATININE-BSD FRML MDRD: 35 ML/MIN/1.73M2
GFR SERPL CREATININE-BSD FRML MDRD: 38 ML/MIN/1.73M2
GFR SERPL CREATININE-BSD FRML MDRD: 40 ML/MIN/1.73M2
GLUCOSE BLD-MCNC: 154 MG/DL (ref 70–125)
GLUCOSE BLDC GLUCOMTR-MCNC: 181 MG/DL (ref 70–99)
GLUCOSE SERPL-MCNC: 136 MG/DL (ref 70–99)
GLUCOSE SERPL-MCNC: 164 MG/DL (ref 70–99)
GLUCOSE SERPL-MCNC: 175 MG/DL (ref 70–99)
GLUCOSE SERPL-MCNC: 187 MG/DL (ref 70–99)
GLUCOSE SERPL-MCNC: 192 MG/DL (ref 70–99)
GLUCOSE SERPL-MCNC: 196 MG/DL (ref 70–99)
GLUCOSE SERPL-MCNC: 252 MG/DL (ref 70–99)
GLUCOSE SERPL-MCNC: 371 MG/DL (ref 70–99)
GLUCOSE UR STRIP-MCNC: 500 MG/DL
HBA1C MFR BLD: 8.5 %
HCT VFR BLD AUTO: 28.4 % (ref 40–53)
HCT VFR BLD AUTO: 29.9 % (ref 40–53)
HCT VFR BLD AUTO: 30.2 % (ref 40–53)
HCT VFR BLD AUTO: 31.4 % (ref 40–53)
HCT VFR BLD AUTO: 31.6 % (ref 40–53)
HCT VFR BLD AUTO: 32.2 % (ref 40–53)
HCT VFR BLD AUTO: 36.1 % (ref 40–53)
HGB BLD-MCNC: 10.8 G/DL (ref 13.3–17.7)
HGB BLD-MCNC: 8.4 G/DL (ref 13.3–17.7)
HGB BLD-MCNC: 8.7 G/DL (ref 13.3–17.7)
HGB BLD-MCNC: 8.8 G/DL (ref 13.3–17.7)
HGB BLD-MCNC: 8.9 G/DL (ref 13.3–17.7)
HGB BLD-MCNC: 9.1 G/DL (ref 13.3–17.7)
HGB BLD-MCNC: 9.1 G/DL (ref 13.3–17.7)
HGB UR QL STRIP: NEGATIVE
HYALINE CASTS: 1 /LPF
IMM GRANULOCYTES # BLD: 0 10E3/UL
IMM GRANULOCYTES NFR BLD: 1 %
INTERPRETATION ECG - MUSE: NORMAL
KETONES UR STRIP-MCNC: NEGATIVE MG/DL
LEUKOCYTE ESTERASE UR QL STRIP: NEGATIVE
LIPASE SERPL-CCNC: 17 U/L (ref 0–52)
LYMPHOCYTES # BLD AUTO: 0.8 10E3/UL (ref 0.8–5.3)
LYMPHOCYTES # BLD AUTO: 0.9 10E3/UL (ref 0.8–5.3)
LYMPHOCYTES # BLD AUTO: 1 10E3/UL (ref 0.8–5.3)
LYMPHOCYTES NFR BLD AUTO: 24 %
LYMPHOCYTES NFR BLD AUTO: 26 %
LYMPHOCYTES NFR BLD AUTO: 28 %
LYMPHOCYTES NFR BLD AUTO: 31 %
MAGNESIUM SERPL-MCNC: 2.2 MG/DL (ref 1.8–2.6)
MCH RBC QN AUTO: 23.4 PG (ref 26.5–33)
MCH RBC QN AUTO: 23.4 PG (ref 26.5–33)
MCH RBC QN AUTO: 23.6 PG (ref 26.5–33)
MCH RBC QN AUTO: 23.7 PG (ref 26.5–33)
MCH RBC QN AUTO: 23.7 PG (ref 26.5–33)
MCH RBC QN AUTO: 24.2 PG (ref 26.5–33)
MCH RBC QN AUTO: 29 PG (ref 26.5–33)
MCHC RBC AUTO-ENTMCNC: 28.3 G/DL (ref 31.5–36.5)
MCHC RBC AUTO-ENTMCNC: 28.3 G/DL (ref 31.5–36.5)
MCHC RBC AUTO-ENTMCNC: 28.8 G/DL (ref 31.5–36.5)
MCHC RBC AUTO-ENTMCNC: 28.8 G/DL (ref 31.5–36.5)
MCHC RBC AUTO-ENTMCNC: 29.4 G/DL (ref 31.5–36.5)
MCHC RBC AUTO-ENTMCNC: 29.6 G/DL (ref 31.5–36.5)
MCHC RBC AUTO-ENTMCNC: 29.9 G/DL (ref 31.5–36.5)
MCV RBC AUTO: 79 FL (ref 78–100)
MCV RBC AUTO: 80 FL (ref 78–100)
MCV RBC AUTO: 82 FL (ref 78–100)
MCV RBC AUTO: 82 FL (ref 78–100)
MCV RBC AUTO: 84 FL (ref 78–100)
MCV RBC AUTO: 84 FL (ref 78–100)
MCV RBC AUTO: 97 FL (ref 78–100)
MDC_IDC_EPISODE_DTM: NORMAL
MDC_IDC_EPISODE_DURATION: 1 S
MDC_IDC_EPISODE_DURATION: 10 S
MDC_IDC_EPISODE_DURATION: 1146 S
MDC_IDC_EPISODE_DURATION: 123 S
MDC_IDC_EPISODE_DURATION: 13 S
MDC_IDC_EPISODE_DURATION: 146 S
MDC_IDC_EPISODE_DURATION: 1724 S
MDC_IDC_EPISODE_DURATION: 24 S
MDC_IDC_EPISODE_DURATION: 6 S
MDC_IDC_EPISODE_DURATION: 61 S
MDC_IDC_EPISODE_DURATION: 7 S
MDC_IDC_EPISODE_DURATION: 7 S
MDC_IDC_EPISODE_DURATION: 8 S
MDC_IDC_EPISODE_DURATION: 8 S
MDC_IDC_EPISODE_DURATION: 9 S
MDC_IDC_EPISODE_ID: 1
MDC_IDC_EPISODE_ID: 1432
MDC_IDC_EPISODE_ID: 3687
MDC_IDC_EPISODE_ID: 3688
MDC_IDC_EPISODE_ID: 3689
MDC_IDC_EPISODE_ID: 3690
MDC_IDC_EPISODE_ID: 3691
MDC_IDC_EPISODE_ID: 3692
MDC_IDC_EPISODE_ID: 3693
MDC_IDC_EPISODE_ID: 5306
MDC_IDC_EPISODE_ID: 8841
MDC_IDC_EPISODE_ID: 8842
MDC_IDC_EPISODE_ID: 8843
MDC_IDC_EPISODE_ID: 8844
MDC_IDC_EPISODE_ID: 8845
MDC_IDC_EPISODE_ID: 8846
MDC_IDC_EPISODE_ID: 8847
MDC_IDC_EPISODE_TYPE: NORMAL
MDC_IDC_LEAD_IMPLANT_DT: NORMAL
MDC_IDC_LEAD_LOCATION: NORMAL
MDC_IDC_LEAD_LOCATION_DETAIL_1: NORMAL
MDC_IDC_LEAD_MFG: NORMAL
MDC_IDC_LEAD_MODEL: NORMAL
MDC_IDC_LEAD_POLARITY_TYPE: NORMAL
MDC_IDC_LEAD_SERIAL: NORMAL
MDC_IDC_LEAD_SPECIAL_FUNCTION: NORMAL
MDC_IDC_MSMT_BATTERY_DTM: NORMAL
MDC_IDC_MSMT_BATTERY_DTM: NORMAL
MDC_IDC_MSMT_BATTERY_REMAINING_LONGEVITY: 130 MO
MDC_IDC_MSMT_BATTERY_REMAINING_LONGEVITY: 139 MO
MDC_IDC_MSMT_BATTERY_RRT_TRIGGER: 2.6
MDC_IDC_MSMT_BATTERY_RRT_TRIGGER: 2.6
MDC_IDC_MSMT_BATTERY_STATUS: NORMAL
MDC_IDC_MSMT_BATTERY_STATUS: NORMAL
MDC_IDC_MSMT_BATTERY_VOLTAGE: 3.1 V
MDC_IDC_MSMT_BATTERY_VOLTAGE: 3.16 V
MDC_IDC_MSMT_LEADCHNL_LV_IMPEDANCE_VALUE: 342 OHM
MDC_IDC_MSMT_LEADCHNL_LV_IMPEDANCE_VALUE: 361 OHM
MDC_IDC_MSMT_LEADCHNL_LV_IMPEDANCE_VALUE: 361 OHM
MDC_IDC_MSMT_LEADCHNL_LV_IMPEDANCE_VALUE: 380 OHM
MDC_IDC_MSMT_LEADCHNL_LV_IMPEDANCE_VALUE: 399 OHM
MDC_IDC_MSMT_LEADCHNL_LV_IMPEDANCE_VALUE: 418 OHM
MDC_IDC_MSMT_LEADCHNL_LV_IMPEDANCE_VALUE: 418 OHM
MDC_IDC_MSMT_LEADCHNL_LV_IMPEDANCE_VALUE: 456 OHM
MDC_IDC_MSMT_LEADCHNL_LV_IMPEDANCE_VALUE: 589 OHM
MDC_IDC_MSMT_LEADCHNL_LV_IMPEDANCE_VALUE: 608 OHM
MDC_IDC_MSMT_LEADCHNL_LV_IMPEDANCE_VALUE: 627 OHM
MDC_IDC_MSMT_LEADCHNL_LV_IMPEDANCE_VALUE: 646 OHM
MDC_IDC_MSMT_LEADCHNL_LV_IMPEDANCE_VALUE: 665 OHM
MDC_IDC_MSMT_LEADCHNL_LV_IMPEDANCE_VALUE: 684 OHM
MDC_IDC_MSMT_LEADCHNL_LV_IMPEDANCE_VALUE: 722 OHM
MDC_IDC_MSMT_LEADCHNL_LV_IMPEDANCE_VALUE: 760 OHM
MDC_IDC_MSMT_LEADCHNL_LV_PACING_THRESHOLD_AMPLITUDE: 1.25 V
MDC_IDC_MSMT_LEADCHNL_LV_PACING_THRESHOLD_AMPLITUDE: 1.5 V
MDC_IDC_MSMT_LEADCHNL_LV_PACING_THRESHOLD_PULSEWIDTH: 0.4 MS
MDC_IDC_MSMT_LEADCHNL_LV_PACING_THRESHOLD_PULSEWIDTH: 0.4 MS
MDC_IDC_MSMT_LEADCHNL_RA_IMPEDANCE_VALUE: 247 OHM
MDC_IDC_MSMT_LEADCHNL_RA_IMPEDANCE_VALUE: 266 OHM
MDC_IDC_MSMT_LEADCHNL_RA_IMPEDANCE_VALUE: 380 OHM
MDC_IDC_MSMT_LEADCHNL_RA_IMPEDANCE_VALUE: 380 OHM
MDC_IDC_MSMT_LEADCHNL_RA_PACING_THRESHOLD_AMPLITUDE: 1 V
MDC_IDC_MSMT_LEADCHNL_RA_PACING_THRESHOLD_AMPLITUDE: 1.12 V
MDC_IDC_MSMT_LEADCHNL_RA_PACING_THRESHOLD_PULSEWIDTH: 0.4 MS
MDC_IDC_MSMT_LEADCHNL_RA_PACING_THRESHOLD_PULSEWIDTH: 0.4 MS
MDC_IDC_MSMT_LEADCHNL_RA_SENSING_INTR_AMPL: 1.38 MV
MDC_IDC_MSMT_LEADCHNL_RA_SENSING_INTR_AMPL: 1.62 MV
MDC_IDC_MSMT_LEADCHNL_RA_SENSING_INTR_AMPL: 1.62 MV
MDC_IDC_MSMT_LEADCHNL_RV_IMPEDANCE_VALUE: 342 OHM
MDC_IDC_MSMT_LEADCHNL_RV_IMPEDANCE_VALUE: 342 OHM
MDC_IDC_MSMT_LEADCHNL_RV_IMPEDANCE_VALUE: 475 OHM
MDC_IDC_MSMT_LEADCHNL_RV_IMPEDANCE_VALUE: 494 OHM
MDC_IDC_MSMT_LEADCHNL_RV_PACING_THRESHOLD_AMPLITUDE: 0.62 V
MDC_IDC_MSMT_LEADCHNL_RV_PACING_THRESHOLD_AMPLITUDE: 0.75 V
MDC_IDC_MSMT_LEADCHNL_RV_PACING_THRESHOLD_PULSEWIDTH: 0.4 MS
MDC_IDC_MSMT_LEADCHNL_RV_PACING_THRESHOLD_PULSEWIDTH: 0.4 MS
MDC_IDC_MSMT_LEADCHNL_RV_SENSING_INTR_AMPL: 12.4 MV
MDC_IDC_MSMT_LEADCHNL_RV_SENSING_INTR_AMPL: 9.25 MV
MDC_IDC_MSMT_LEADCHNL_RV_SENSING_INTR_AMPL: 9.25 MV
MDC_IDC_PG_IMPLANT_DTM: NORMAL
MDC_IDC_PG_IMPLANT_DTM: NORMAL
MDC_IDC_PG_MFG: NORMAL
MDC_IDC_PG_MFG: NORMAL
MDC_IDC_PG_MODEL: NORMAL
MDC_IDC_PG_MODEL: NORMAL
MDC_IDC_PG_SERIAL: NORMAL
MDC_IDC_PG_SERIAL: NORMAL
MDC_IDC_PG_TYPE: NORMAL
MDC_IDC_PG_TYPE: NORMAL
MDC_IDC_SESS_CLINIC_NAME: NORMAL
MDC_IDC_SESS_CLINIC_NAME: NORMAL
MDC_IDC_SESS_DTM: NORMAL
MDC_IDC_SESS_DTM: NORMAL
MDC_IDC_SESS_TYPE: NORMAL
MDC_IDC_SESS_TYPE: NORMAL
MDC_IDC_SET_BRADY_AT_MODE_SWITCH_RATE: 171 {BEATS}/MIN
MDC_IDC_SET_BRADY_AT_MODE_SWITCH_RATE: 171 {BEATS}/MIN
MDC_IDC_SET_BRADY_LOWRATE: 60 {BEATS}/MIN
MDC_IDC_SET_BRADY_LOWRATE: 60 {BEATS}/MIN
MDC_IDC_SET_BRADY_MAX_SENSOR_RATE: 120 {BEATS}/MIN
MDC_IDC_SET_BRADY_MAX_SENSOR_RATE: 120 {BEATS}/MIN
MDC_IDC_SET_BRADY_MAX_TRACKING_RATE: 120 {BEATS}/MIN
MDC_IDC_SET_BRADY_MAX_TRACKING_RATE: 120 {BEATS}/MIN
MDC_IDC_SET_BRADY_MODE: NORMAL
MDC_IDC_SET_BRADY_MODE: NORMAL
MDC_IDC_SET_BRADY_PAV_DELAY_LOW: 100 MS
MDC_IDC_SET_BRADY_PAV_DELAY_LOW: 170 MS
MDC_IDC_SET_BRADY_SAV_DELAY_LOW: 140 MS
MDC_IDC_SET_BRADY_SAV_DELAY_LOW: 140 MS
MDC_IDC_SET_CRT_LVRV_DELAY: 0 MS
MDC_IDC_SET_CRT_LVRV_DELAY: 0 MS
MDC_IDC_SET_CRT_PACED_CHAMBERS: NORMAL
MDC_IDC_SET_CRT_PACED_CHAMBERS: NORMAL
MDC_IDC_SET_LEADCHNL_LV_PACING_AMPLITUDE: 1.75 V
MDC_IDC_SET_LEADCHNL_LV_PACING_AMPLITUDE: 2 V
MDC_IDC_SET_LEADCHNL_LV_PACING_ANODE_ELECTRODE_1: NORMAL
MDC_IDC_SET_LEADCHNL_LV_PACING_ANODE_ELECTRODE_1: NORMAL
MDC_IDC_SET_LEADCHNL_LV_PACING_ANODE_LOCATION_1: NORMAL
MDC_IDC_SET_LEADCHNL_LV_PACING_ANODE_LOCATION_1: NORMAL
MDC_IDC_SET_LEADCHNL_LV_PACING_CAPTURE_MODE: NORMAL
MDC_IDC_SET_LEADCHNL_LV_PACING_CAPTURE_MODE: NORMAL
MDC_IDC_SET_LEADCHNL_LV_PACING_CATHODE_ELECTRODE_1: NORMAL
MDC_IDC_SET_LEADCHNL_LV_PACING_CATHODE_ELECTRODE_1: NORMAL
MDC_IDC_SET_LEADCHNL_LV_PACING_CATHODE_LOCATION_1: NORMAL
MDC_IDC_SET_LEADCHNL_LV_PACING_CATHODE_LOCATION_1: NORMAL
MDC_IDC_SET_LEADCHNL_LV_PACING_POLARITY: NORMAL
MDC_IDC_SET_LEADCHNL_LV_PACING_POLARITY: NORMAL
MDC_IDC_SET_LEADCHNL_LV_PACING_PULSEWIDTH: 0.4 MS
MDC_IDC_SET_LEADCHNL_LV_PACING_PULSEWIDTH: 0.4 MS
MDC_IDC_SET_LEADCHNL_RA_PACING_AMPLITUDE: 2.25 V
MDC_IDC_SET_LEADCHNL_RA_PACING_AMPLITUDE: 3.5 V
MDC_IDC_SET_LEADCHNL_RA_PACING_ANODE_ELECTRODE_1: NORMAL
MDC_IDC_SET_LEADCHNL_RA_PACING_ANODE_ELECTRODE_1: NORMAL
MDC_IDC_SET_LEADCHNL_RA_PACING_ANODE_LOCATION_1: NORMAL
MDC_IDC_SET_LEADCHNL_RA_PACING_ANODE_LOCATION_1: NORMAL
MDC_IDC_SET_LEADCHNL_RA_PACING_CAPTURE_MODE: NORMAL
MDC_IDC_SET_LEADCHNL_RA_PACING_CAPTURE_MODE: NORMAL
MDC_IDC_SET_LEADCHNL_RA_PACING_CATHODE_ELECTRODE_1: NORMAL
MDC_IDC_SET_LEADCHNL_RA_PACING_CATHODE_ELECTRODE_1: NORMAL
MDC_IDC_SET_LEADCHNL_RA_PACING_CATHODE_LOCATION_1: NORMAL
MDC_IDC_SET_LEADCHNL_RA_PACING_CATHODE_LOCATION_1: NORMAL
MDC_IDC_SET_LEADCHNL_RA_PACING_POLARITY: NORMAL
MDC_IDC_SET_LEADCHNL_RA_PACING_POLARITY: NORMAL
MDC_IDC_SET_LEADCHNL_RA_PACING_PULSEWIDTH: 0.4 MS
MDC_IDC_SET_LEADCHNL_RA_PACING_PULSEWIDTH: 0.4 MS
MDC_IDC_SET_LEADCHNL_RA_SENSING_ANODE_ELECTRODE_1: NORMAL
MDC_IDC_SET_LEADCHNL_RA_SENSING_ANODE_ELECTRODE_1: NORMAL
MDC_IDC_SET_LEADCHNL_RA_SENSING_ANODE_LOCATION_1: NORMAL
MDC_IDC_SET_LEADCHNL_RA_SENSING_ANODE_LOCATION_1: NORMAL
MDC_IDC_SET_LEADCHNL_RA_SENSING_CATHODE_ELECTRODE_1: NORMAL
MDC_IDC_SET_LEADCHNL_RA_SENSING_CATHODE_ELECTRODE_1: NORMAL
MDC_IDC_SET_LEADCHNL_RA_SENSING_CATHODE_LOCATION_1: NORMAL
MDC_IDC_SET_LEADCHNL_RA_SENSING_CATHODE_LOCATION_1: NORMAL
MDC_IDC_SET_LEADCHNL_RA_SENSING_POLARITY: NORMAL
MDC_IDC_SET_LEADCHNL_RA_SENSING_POLARITY: NORMAL
MDC_IDC_SET_LEADCHNL_RA_SENSING_SENSITIVITY: 0.3 MV
MDC_IDC_SET_LEADCHNL_RA_SENSING_SENSITIVITY: 0.3 MV
MDC_IDC_SET_LEADCHNL_RV_PACING_AMPLITUDE: 2 V
MDC_IDC_SET_LEADCHNL_RV_PACING_AMPLITUDE: 3.5 V
MDC_IDC_SET_LEADCHNL_RV_PACING_ANODE_ELECTRODE_1: NORMAL
MDC_IDC_SET_LEADCHNL_RV_PACING_ANODE_ELECTRODE_1: NORMAL
MDC_IDC_SET_LEADCHNL_RV_PACING_ANODE_LOCATION_1: NORMAL
MDC_IDC_SET_LEADCHNL_RV_PACING_ANODE_LOCATION_1: NORMAL
MDC_IDC_SET_LEADCHNL_RV_PACING_CAPTURE_MODE: NORMAL
MDC_IDC_SET_LEADCHNL_RV_PACING_CAPTURE_MODE: NORMAL
MDC_IDC_SET_LEADCHNL_RV_PACING_CATHODE_ELECTRODE_1: NORMAL
MDC_IDC_SET_LEADCHNL_RV_PACING_CATHODE_ELECTRODE_1: NORMAL
MDC_IDC_SET_LEADCHNL_RV_PACING_CATHODE_LOCATION_1: NORMAL
MDC_IDC_SET_LEADCHNL_RV_PACING_CATHODE_LOCATION_1: NORMAL
MDC_IDC_SET_LEADCHNL_RV_PACING_POLARITY: NORMAL
MDC_IDC_SET_LEADCHNL_RV_PACING_POLARITY: NORMAL
MDC_IDC_SET_LEADCHNL_RV_PACING_PULSEWIDTH: 0.4 MS
MDC_IDC_SET_LEADCHNL_RV_PACING_PULSEWIDTH: 0.4 MS
MDC_IDC_SET_LEADCHNL_RV_SENSING_ANODE_ELECTRODE_1: NORMAL
MDC_IDC_SET_LEADCHNL_RV_SENSING_ANODE_ELECTRODE_1: NORMAL
MDC_IDC_SET_LEADCHNL_RV_SENSING_ANODE_LOCATION_1: NORMAL
MDC_IDC_SET_LEADCHNL_RV_SENSING_ANODE_LOCATION_1: NORMAL
MDC_IDC_SET_LEADCHNL_RV_SENSING_CATHODE_ELECTRODE_1: NORMAL
MDC_IDC_SET_LEADCHNL_RV_SENSING_CATHODE_ELECTRODE_1: NORMAL
MDC_IDC_SET_LEADCHNL_RV_SENSING_CATHODE_LOCATION_1: NORMAL
MDC_IDC_SET_LEADCHNL_RV_SENSING_CATHODE_LOCATION_1: NORMAL
MDC_IDC_SET_LEADCHNL_RV_SENSING_POLARITY: NORMAL
MDC_IDC_SET_LEADCHNL_RV_SENSING_POLARITY: NORMAL
MDC_IDC_SET_LEADCHNL_RV_SENSING_SENSITIVITY: 0.9 MV
MDC_IDC_SET_LEADCHNL_RV_SENSING_SENSITIVITY: 0.9 MV
MDC_IDC_SET_ZONE_DETECTION_INTERVAL: 350 MS
MDC_IDC_SET_ZONE_DETECTION_INTERVAL: 350 MS
MDC_IDC_SET_ZONE_DETECTION_INTERVAL: 400 MS
MDC_IDC_SET_ZONE_DETECTION_INTERVAL: 400 MS
MDC_IDC_SET_ZONE_TYPE: NORMAL
MDC_IDC_STAT_AT_BURDEN_PERCENT: 0 %
MDC_IDC_STAT_AT_BURDEN_PERCENT: 0 %
MDC_IDC_STAT_AT_DTM_END: NORMAL
MDC_IDC_STAT_AT_DTM_END: NORMAL
MDC_IDC_STAT_AT_DTM_START: NORMAL
MDC_IDC_STAT_AT_DTM_START: NORMAL
MDC_IDC_STAT_BRADY_AP_VP_PERCENT: 4.86 %
MDC_IDC_STAT_BRADY_AP_VP_PERCENT: 7.19 %
MDC_IDC_STAT_BRADY_AP_VS_PERCENT: 0.13 %
MDC_IDC_STAT_BRADY_AP_VS_PERCENT: 0.17 %
MDC_IDC_STAT_BRADY_AS_VP_PERCENT: 88.53 %
MDC_IDC_STAT_BRADY_AS_VP_PERCENT: 90.05 %
MDC_IDC_STAT_BRADY_AS_VS_PERCENT: 4.1 %
MDC_IDC_STAT_BRADY_AS_VS_PERCENT: 4.96 %
MDC_IDC_STAT_BRADY_DTM_END: NORMAL
MDC_IDC_STAT_BRADY_DTM_END: NORMAL
MDC_IDC_STAT_BRADY_DTM_START: NORMAL
MDC_IDC_STAT_BRADY_DTM_START: NORMAL
MDC_IDC_STAT_BRADY_RA_PERCENT_PACED: 5.64 %
MDC_IDC_STAT_BRADY_RA_PERCENT_PACED: 8.38 %
MDC_IDC_STAT_BRADY_RV_PERCENT_PACED: 47.95 %
MDC_IDC_STAT_BRADY_RV_PERCENT_PACED: 65.71 %
MDC_IDC_STAT_CRT_DTM_END: NORMAL
MDC_IDC_STAT_CRT_DTM_END: NORMAL
MDC_IDC_STAT_CRT_DTM_START: NORMAL
MDC_IDC_STAT_CRT_DTM_START: NORMAL
MDC_IDC_STAT_CRT_LV_PERCENT_PACED: 94.88 %
MDC_IDC_STAT_CRT_LV_PERCENT_PACED: 95.69 %
MDC_IDC_STAT_CRT_PERCENT_PACED: 65.68 %
MDC_IDC_STAT_CRT_PERCENT_PACED: 94.8 %
MDC_IDC_STAT_EPISODE_RECENT_COUNT: 0
MDC_IDC_STAT_EPISODE_RECENT_COUNT: 1
MDC_IDC_STAT_EPISODE_RECENT_COUNT_DTM_END: NORMAL
MDC_IDC_STAT_EPISODE_RECENT_COUNT_DTM_START: NORMAL
MDC_IDC_STAT_EPISODE_TOTAL_COUNT: 0
MDC_IDC_STAT_EPISODE_TOTAL_COUNT: 1
MDC_IDC_STAT_EPISODE_TOTAL_COUNT: 1
MDC_IDC_STAT_EPISODE_TOTAL_COUNT_DTM_END: NORMAL
MDC_IDC_STAT_EPISODE_TOTAL_COUNT_DTM_START: NORMAL
MDC_IDC_STAT_EPISODE_TYPE: NORMAL
MONOCYTES # BLD AUTO: 0.3 10E3/UL (ref 0–1.3)
MONOCYTES # BLD AUTO: 0.3 10E3/UL (ref 0–1.3)
MONOCYTES # BLD AUTO: 0.4 10E3/UL (ref 0–1.3)
MONOCYTES # BLD AUTO: 0.5 10E3/UL (ref 0–1.3)
MONOCYTES NFR BLD AUTO: 10 %
MONOCYTES NFR BLD AUTO: 11 %
MONOCYTES NFR BLD AUTO: 11 %
MONOCYTES NFR BLD AUTO: 12 %
MONOCYTES NFR BLD AUTO: 12 %
MONOCYTES NFR BLD AUTO: 15 %
MUCOUS THREADS #/AREA URNS LPF: PRESENT /LPF
NEUTROPHILS # BLD AUTO: 1.7 10E3/UL (ref 1.6–8.3)
NEUTROPHILS # BLD AUTO: 1.8 10E3/UL (ref 1.6–8.3)
NEUTROPHILS # BLD AUTO: 1.9 10E3/UL (ref 1.6–8.3)
NEUTROPHILS # BLD AUTO: 1.9 10E3/UL (ref 1.6–8.3)
NEUTROPHILS # BLD AUTO: 2 10E3/UL (ref 1.6–8.3)
NEUTROPHILS # BLD AUTO: 2.3 10E3/UL (ref 1.6–8.3)
NEUTROPHILS NFR BLD AUTO: 54 %
NEUTROPHILS NFR BLD AUTO: 57 %
NEUTROPHILS NFR BLD AUTO: 59 %
NEUTROPHILS NFR BLD AUTO: 60 %
NEUTROPHILS NFR BLD AUTO: 61 %
NEUTROPHILS NFR BLD AUTO: 63 %
NITRATE UR QL: NEGATIVE
NRBC # BLD AUTO: 0 10E3/UL
NRBC BLD AUTO-RTO: 0 /100
NRBC BLD AUTO-RTO: 0 /100
NRBC BLD AUTO-RTO: 1 /100
NT-PROBNP SERPL-MCNC: ABNORMAL PG/ML (ref 0–1800)
P AXIS - MUSE: 43 DEGREES
PH UR STRIP: 5 [PH] (ref 5–7)
PLAT MORPH BLD: ABNORMAL
PLATELET # BLD AUTO: 111 10E3/UL (ref 150–450)
PLATELET # BLD AUTO: 52 10E3/UL (ref 150–450)
PLATELET # BLD AUTO: 53 10E3/UL (ref 150–450)
PLATELET # BLD AUTO: 60 10E3/UL (ref 150–450)
PLATELET # BLD AUTO: 61 10E3/UL (ref 150–450)
PLATELET # BLD AUTO: 62 10E3/UL (ref 150–450)
PLATELET # BLD AUTO: 76 10E3/UL (ref 150–450)
POLYCHROMASIA BLD QL SMEAR: ABNORMAL
POLYCHROMASIA BLD QL SMEAR: ABNORMAL
POTASSIUM BLD-SCNC: 3.8 MMOL/L (ref 3.5–5)
POTASSIUM SERPL-SCNC: 3.6 MMOL/L (ref 3.4–5.3)
POTASSIUM SERPL-SCNC: 3.8 MMOL/L (ref 3.4–5.3)
POTASSIUM SERPL-SCNC: 4.2 MMOL/L (ref 3.4–5.3)
POTASSIUM SERPL-SCNC: 4.3 MMOL/L (ref 3.4–5.3)
PR INTERVAL - MUSE: 158 MS
PROCALCITONIN SERPL-MCNC: 0.06 NG/ML (ref 0–0.49)
PROT SERPL-MCNC: 6.1 G/DL (ref 6–8)
QRS DURATION - MUSE: 134 MS
QT - MUSE: 466 MS
QTC - MUSE: 531 MS
R AXIS - MUSE: 112 DEGREES
RBC # BLD AUTO: 3.59 10E6/UL (ref 4.4–5.9)
RBC # BLD AUTO: 3.6 10E6/UL (ref 4.4–5.9)
RBC # BLD AUTO: 3.73 10E6/UL (ref 4.4–5.9)
RBC # BLD AUTO: 3.73 10E6/UL (ref 4.4–5.9)
RBC # BLD AUTO: 3.81 10E6/UL (ref 4.4–5.9)
RBC # BLD AUTO: 3.84 10E6/UL (ref 4.4–5.9)
RBC # BLD AUTO: 3.84 10E6/UL (ref 4.4–5.9)
RBC MORPH BLD: ABNORMAL
RBC URINE: 0 /HPF
RSV RNA SPEC NAA+PROBE: NEGATIVE
SARS-COV-2 RNA RESP QL NAA+PROBE: NEGATIVE
SODIUM SERPL-SCNC: 135 MMOL/L (ref 136–145)
SODIUM SERPL-SCNC: 136 MMOL/L (ref 136–145)
SODIUM SERPL-SCNC: 137 MMOL/L (ref 136–145)
SODIUM SERPL-SCNC: 138 MMOL/L (ref 136–145)
SODIUM SERPL-SCNC: 139 MMOL/L (ref 136–145)
SODIUM SERPL-SCNC: 139 MMOL/L (ref 136–145)
SODIUM SERPL-SCNC: 140 MMOL/L (ref 136–145)
SP GR UR STRIP: 1.01 (ref 1–1.03)
SPECIMEN EXPIRATION DATE: NORMAL
SYSTOLIC BLOOD PRESSURE - MUSE: 104 MMHG
T AXIS - MUSE: 260 DEGREES
TROPONIN I SERPL-MCNC: 0.06 NG/ML (ref 0–0.29)
TSH SERPL DL<=0.005 MIU/L-ACNC: 2.17 UIU/ML (ref 0.3–5)
UROBILINOGEN UR STRIP-MCNC: <2 MG/DL
VENTRICULAR RATE- MUSE: 78 BPM
WBC # BLD AUTO: 3 10E3/UL (ref 4–11)
WBC # BLD AUTO: 3.2 10E3/UL (ref 4–11)
WBC # BLD AUTO: 3.3 10E3/UL (ref 4–11)
WBC # BLD AUTO: 3.6 10E3/UL (ref 4–11)
WBC # BLD AUTO: 5.6 10E3/UL (ref 4–11)
WBC URINE: <1 /HPF

## 2023-01-01 PROCEDURE — 83690 ASSAY OF LIPASE: CPT | Performed by: EMERGENCY MEDICINE

## 2023-01-01 PROCEDURE — 99215 OFFICE O/P EST HI 40 MIN: CPT | Performed by: PHYSICIAN ASSISTANT

## 2023-01-01 PROCEDURE — 36415 COLL VENOUS BLD VENIPUNCTURE: CPT | Mod: ORL | Performed by: PHYSICIAN ASSISTANT

## 2023-01-01 PROCEDURE — 83880 ASSAY OF NATRIURETIC PEPTIDE: CPT | Mod: ORL | Performed by: PHYSICIAN ASSISTANT

## 2023-01-01 PROCEDURE — 84145 PROCALCITONIN (PCT): CPT | Performed by: EMERGENCY MEDICINE

## 2023-01-01 PROCEDURE — 93294 REM INTERROG EVL PM/LDLS PM: CPT | Performed by: INTERNAL MEDICINE

## 2023-01-01 PROCEDURE — P9604 ONE-WAY ALLOW PRORATED TRIP: HCPCS | Mod: ORL | Performed by: INTERNAL MEDICINE

## 2023-01-01 PROCEDURE — 36415 COLL VENOUS BLD VENIPUNCTURE: CPT | Mod: ORL | Performed by: INTERNAL MEDICINE

## 2023-01-01 PROCEDURE — 80048 BASIC METABOLIC PNL TOTAL CA: CPT | Mod: ORL | Performed by: PHYSICIAN ASSISTANT

## 2023-01-01 PROCEDURE — 86850 RBC ANTIBODY SCREEN: CPT | Performed by: EMERGENCY MEDICINE

## 2023-01-01 PROCEDURE — 250N000013 HC RX MED GY IP 250 OP 250 PS 637: Performed by: EMERGENCY MEDICINE

## 2023-01-01 PROCEDURE — P9604 ONE-WAY ALLOW PRORATED TRIP: HCPCS | Mod: ORL | Performed by: PHYSICIAN ASSISTANT

## 2023-01-01 PROCEDURE — 80048 BASIC METABOLIC PNL TOTAL CA: CPT | Performed by: PATHOLOGY

## 2023-01-01 PROCEDURE — 36415 COLL VENOUS BLD VENIPUNCTURE: CPT | Performed by: PATHOLOGY

## 2023-01-01 PROCEDURE — 85025 COMPLETE CBC W/AUTO DIFF WBC: CPT | Mod: ORL | Performed by: PHYSICIAN ASSISTANT

## 2023-01-01 PROCEDURE — 84443 ASSAY THYROID STIM HORMONE: CPT | Performed by: EMERGENCY MEDICINE

## 2023-01-01 PROCEDURE — 83880 ASSAY OF NATRIURETIC PEPTIDE: CPT | Performed by: EMERGENCY MEDICINE

## 2023-01-01 PROCEDURE — 99285 EMERGENCY DEPT VISIT HI MDM: CPT | Mod: 25

## 2023-01-01 PROCEDURE — 82374 ASSAY BLOOD CARBON DIOXIDE: CPT | Performed by: EMERGENCY MEDICINE

## 2023-01-01 PROCEDURE — 82962 GLUCOSE BLOOD TEST: CPT

## 2023-01-01 PROCEDURE — G0463 HOSPITAL OUTPT CLINIC VISIT: HCPCS | Performed by: PHYSICIAN ASSISTANT

## 2023-01-01 PROCEDURE — 82248 BILIRUBIN DIRECT: CPT | Performed by: EMERGENCY MEDICINE

## 2023-01-01 PROCEDURE — 86901 BLOOD TYPING SEROLOGIC RH(D): CPT | Performed by: EMERGENCY MEDICINE

## 2023-01-01 PROCEDURE — 85025 COMPLETE CBC W/AUTO DIFF WBC: CPT | Performed by: EMERGENCY MEDICINE

## 2023-01-01 PROCEDURE — G0463 HOSPITAL OUTPT CLINIC VISIT: HCPCS | Performed by: NURSE PRACTITIONER

## 2023-01-01 PROCEDURE — 93296 REM INTERROG EVL PM/IDS: CPT

## 2023-01-01 PROCEDURE — 87637 SARSCOV2&INF A&B&RSV AMP PRB: CPT | Performed by: EMERGENCY MEDICINE

## 2023-01-01 PROCEDURE — 81003 URINALYSIS AUTO W/O SCOPE: CPT | Performed by: EMERGENCY MEDICINE

## 2023-01-01 PROCEDURE — 99214 OFFICE O/P EST MOD 30 MIN: CPT | Performed by: NURSE PRACTITIONER

## 2023-01-01 PROCEDURE — 71045 X-RAY EXAM CHEST 1 VIEW: CPT

## 2023-01-01 PROCEDURE — 36415 COLL VENOUS BLD VENIPUNCTURE: CPT | Performed by: EMERGENCY MEDICINE

## 2023-01-01 PROCEDURE — 86140 C-REACTIVE PROTEIN: CPT | Performed by: EMERGENCY MEDICINE

## 2023-01-01 PROCEDURE — 83036 HEMOGLOBIN GLYCOSYLATED A1C: CPT | Mod: ORL | Performed by: PHYSICIAN ASSISTANT

## 2023-01-01 PROCEDURE — 83735 ASSAY OF MAGNESIUM: CPT | Performed by: EMERGENCY MEDICINE

## 2023-01-01 PROCEDURE — 85025 COMPLETE CBC W/AUTO DIFF WBC: CPT | Mod: ORL | Performed by: INTERNAL MEDICINE

## 2023-01-01 PROCEDURE — 85027 COMPLETE CBC AUTOMATED: CPT | Mod: ORL | Performed by: PHYSICIAN ASSISTANT

## 2023-01-01 PROCEDURE — 84484 ASSAY OF TROPONIN QUANT: CPT | Performed by: EMERGENCY MEDICINE

## 2023-01-01 PROCEDURE — 93005 ELECTROCARDIOGRAM TRACING: CPT | Performed by: EMERGENCY MEDICINE

## 2023-01-01 RX ORDER — FUROSEMIDE 20 MG
20 TABLET ORAL EVERY EVENING
Qty: 30 TABLET | Refills: 0 | Status: SHIPPED | OUTPATIENT
Start: 2023-01-01 | End: 2023-01-01

## 2023-01-01 RX ORDER — CLONAZEPAM 0.5 MG/1
TABLET ORAL
COMMUNITY
Start: 2023-01-01

## 2023-01-01 RX ORDER — FUROSEMIDE 20 MG
TABLET ORAL
Qty: 127 TABLET | Refills: 3 | Status: SHIPPED | OUTPATIENT
Start: 2023-01-01 | End: 2023-01-01

## 2023-01-01 RX ORDER — FUROSEMIDE 20 MG
60 TABLET ORAL 2 TIMES DAILY
Qty: 180 TABLET | Refills: 11 | Status: SHIPPED | OUTPATIENT
Start: 2023-01-01 | End: 2023-01-01

## 2023-01-01 RX ORDER — FUROSEMIDE 20 MG
60 TABLET ORAL 2 TIMES DAILY
Qty: 180 TABLET | Refills: 11 | Status: SHIPPED | OUTPATIENT
Start: 2023-01-01

## 2023-01-01 RX ORDER — SPIRONOLACTONE 25 MG/1
12.5 TABLET ORAL DAILY
Qty: 90 TABLET | Refills: 3 | COMMUNITY
Start: 2023-01-01

## 2023-01-01 RX ORDER — FUROSEMIDE 20 MG
20 TABLET ORAL ONCE
Status: COMPLETED | OUTPATIENT
Start: 2023-01-01 | End: 2023-01-01

## 2023-01-01 RX ORDER — CLOPIDOGREL BISULFATE 75 MG/1
75 TABLET ORAL DAILY
COMMUNITY

## 2023-01-01 RX ORDER — FUROSEMIDE 20 MG
20 TABLET ORAL 2 TIMES DAILY
Qty: 4 TABLET | Refills: 0 | Status: SHIPPED | OUTPATIENT
Start: 2023-01-01 | End: 2023-01-01

## 2023-01-01 RX ORDER — METOPROLOL SUCCINATE 25 MG/1
12.5 TABLET, EXTENDED RELEASE ORAL DAILY
COMMUNITY
Start: 2023-01-01 | End: 2023-01-01 | Stop reason: SINTOL

## 2023-01-01 RX ORDER — METOPROLOL SUCCINATE 25 MG/1
25 TABLET, EXTENDED RELEASE ORAL DAILY
COMMUNITY
Start: 2023-01-01 | End: 2023-01-01

## 2023-01-01 RX ORDER — FUROSEMIDE 20 MG
40 TABLET ORAL EVERY MORNING
Qty: 90 TABLET | Refills: 3 | Status: SHIPPED | OUTPATIENT
Start: 2023-01-01 | End: 2023-01-01

## 2023-01-01 RX ADMIN — FUROSEMIDE 20 MG: 20 TABLET ORAL at 18:19

## 2023-01-01 ASSESSMENT — ACTIVITIES OF DAILY LIVING (ADL)
ADLS_ACUITY_SCORE: 37
ADLS_ACUITY_SCORE: 35

## 2023-01-01 ASSESSMENT — PAIN SCALES - GENERAL
PAINLEVEL: NO PAIN (0)
PAINLEVEL: NO PAIN (0)

## 2023-03-01 NOTE — PROGRESS NOTES
HPI: 82 yr old male presents to [facility] clinic for follow up of systolic heart failure due to ICM with EF 30-35%. He was last seen in [facility] in October, and had an ER visit in November due to influenza A. He otherwise is stable and lives in assisted living. He is accompanied by his niece today. 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   Pt denies SOB, orthopnea, PND, chest pain, belly bloating, loss of appetite, LE edema. Pt states energy level is good. He has no concerns today other than wanting to assure [employer] is managing his pacemaker. He had a remote check done yesterday by [employer].       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)      Chronic systolic heart failure (H) 6/6/2017     Confusion 6/20/2021     Coronary artery disease     triple bypass 1995     CRF (chronic renal failure), stage 3 (moderate) (H) 1/20/2017     Critical lower limb ischemia (H) 1/10/2014     Diabetes mellitus (H)      Diabetes mellitus, type 2 (H) 1/11/2014     Diabetic polyneuropathy (H)      Discitis 2/8/2017     Epidural abscess 2/9/2017     Fall 2/7/2017     History of blood transfusion      HTN (hypertension) 1/11/2014     Hyperglycemia 1/9/2020     Hyperlipidaemia      Hyperlipidemia with target LDL less than 100 1/11/2014     Formatting of this note might be different from the original. Overview:  Diagnosis updated by automated process. Provider to review and confirm. Formatting of this note might be different from the original. Diagnosis updated by automated process. Provider to review and confirm.     Hypertension      Long term current use of anticoagulant therapy 2018     Non-healing ulcer of foot (H)     Right     Noninflammatory pericardial effusion      Osteomyelitis (H) 2014     Osteomyelitis of foot, right, acute (H) 2/15/2017     Osteomyelitis of left foot (H)      PVD (peripheral vascular disease) (H)      Recurrent falls 2021     Sebaceous cyst        FAMILY HISTORY:  Family History   Problem Relation Age of Onset     Cancer Mother         leukemia     Leukemia Mother        SOCIAL HISTORY:  Social History     Socioeconomic History     Marital status: Single     Spouse name: None     Number of children: None     Years of education: None     Highest education level: None   Tobacco Use     Smoking status: Former     Packs/day: 2.00     Years: 30.00     Pack years: 60.00     Types: Cigarettes     Quit date: 1995     Years since quittin.1     Smokeless tobacco: Never   Substance and Sexual Activity     Alcohol use: No     Drug use: No   Social History Narrative    Lives independently with SO. 2017        CURRENT MEDICATIONS:  Current Outpatient Medications   Medication Sig Dispense Refill     alcohol swab prep pads Use to swab area of injection/jina as directed. 100 each 3     apixaban ANTICOAGULANT (ELIQUIS ANTICOAGULANT) 5 MG tablet Take 1 tablet (5 mg) by mouth 2 times daily 180 tablet 3     atorvastatin (LIPITOR) 20 MG tablet Take 1 tablet (20 mg) by mouth daily 90 tablet 0     Mahsa Protect (EUCERIN) external cream Apply topically 2 times daily as needed for dry skin or other Apply as needed to intact stage 1 pressure area and/or irritated skin, rash or excoriation. Apply to legs.       bisacodyl  (DULCOLAX) 10 MG suppository Place 10 mg rectally daily as needed for constipation If no BM or small BM for 3 days/9 shifts       blood glucose (NO BRAND SPECIFIED) test strip Use to test blood sugar 4 times daily or as directed. 100 strip 6     blood glucose calibration (NO BRAND SPECIFIED) solution 1 drop every 14 days Use 1 drop to calibrate blood glucose monitor every 14 days 1 Bottle 3     blood glucose monitoring (NO BRAND SPECIFIED) meter device kit Use to test blood sugar 4 times daily or as directed. 1 kit 0     cholecalciferol 1000 UNITS TABS Take 1,000 Units by mouth daily 30 tablet      citalopram (CELEXA) 20 MG tablet Take 20 mg by mouth every morning        digoxin (LANOXIN) 125 MCG tablet Take 1 tablet (125 mcg) by mouth daily 90 tablet 3     empagliflozin (JARDIANCE) 10 MG TABS tablet Take 1 tablet (10 mg) by mouth daily 90 tablet 1     furosemide (LASIX) 20 MG tablet Take 1 tablet (20 mg) by mouth daily 90 tablet 3     insulin glargine (LANTUS PEN) 100 UNIT/ML pen Inject 50 Units Subcutaneous At Bedtime       insulin lispro (HUMALOG KWIKPEN) 100 UNIT/ML (1 unit dial) KWIKPEN Inject 16 Units Subcutaneous 2 times daily (before meals) Breakfast and lunch       insulin lispro (HUMALOG KWIKPEN) 100 UNIT/ML (1 unit dial) KWIKPEN Inject 18 Units Subcutaneous daily (with dinner)       loperamide (IMODIUM A-D) 2 MG tablet Take 2-4 mg by mouth 4 times daily as needed for diarrhea Take 4 mg after 1st loose stool, then take 1 tablet as needed after each loose stool       metFORMIN (GLUCOPHAGE-XR) 750 MG 24 hr tablet Take 750 mg by mouth every morning       metoprolol succinate ER (TOPROL XL) 100 MG 24 hr tablet Take 2 tablets (200 mg) by mouth daily 180 tablet 3     nitroglycerin (NITROSTAT) 0.4 MG sublingual tablet For chest pain place 1 tablet under the tongue every 5 minutes for 3 doses. If symptoms persist 5 minutes after 1st dose call 911. 25 tablet      nystatin (MYCOSTATIN) 712580 UNIT/GM external powder  Apply topically 2 times daily as needed (to affected area(s) as needed)       pantoprazole (PROTONIX) 40 MG EC tablet Take 40 mg by mouth daily       polyethylene glycol (MIRALAX) 17 GM/Dose powder Take 17 g by mouth daily as needed 510 g 0     sacubitril-valsartan (ENTRESTO)  MG per tablet Take 1 tablet by mouth 2 times daily 180 tablet 3     spironolactone (ALDACTONE) 25 MG tablet Take 1 tablet (25 mg) by mouth daily 90 tablet 3     thin (NO BRAND SPECIFIED) lancets 1 each 4 times daily Use with lanceting device. 100 each 1     traZODone (DESYREL) 50 MG tablet Take 50 mg by mouth At Bedtime       triamcinolone (KENALOG) 0.1 % external cream 2 times daily as needed Apply topically to right stump, left hand and right forearm twice daily as needed for rash       ULTICARE SHORT 31G X 8 MM insulin pen needle Inject 1 each Subcutaneous 4 times daily       acetaminophen (TYLENOL) 325 MG tablet Take 650 mg by mouth every 4 hours as needed Max 3gm per day for minor pain/fever (Patient not taking: Reported on 3/1/2023)       aspirin (ASA) 81 MG chewable tablet Take 81 mg by mouth daily (Patient not taking: Reported on 3/1/2023)       magnesium hydroxide (MILK OF MAGNESIA) 400 MG/5ML suspension Take 30 mLs by mouth daily as needed for constipation or heartburn If no BM/small BM for 3 days/9 shifts (Patient not taking: Reported on 3/1/2023)       order for DME Equipment to be ordered:  Scale- Qty 1  Commander Flex - Qty. 1  Pulse-Oximeter - Qty. 1  Use as directed (Patient not taking: Reported on 3/1/2023) 1 Units 0     order for DME Equipment being ordered: DH2 shoe (Patient not taking: Reported on 3/1/2023) 1 Device 0     venetoclax (VENCLEXTA) 100 MG tablet Take 200 mg by mouth every morning (Patient not taking: Reported on 3/1/2023)         ROS:   Constitutional: No fever, chills, or sweats. No weight gain/loss.   ENT: No visual disturbance, ear ache, epistaxis, sore throat.   Allergies/Immunologic: Negative.  "  Respiratory: No cough, hemoptysis.   Cardiovascular: As per HPI.   GI: No nausea, vomiting, hematemesis, melena, or hematochezia.   : No urinary frequency, dysuria, or hematuria.   Integument: Negative.   Psychiatric: Negative.   Neuro: Negative.   Endocrinology: Negative.   Musculoskeletal: Negative.    EXAM:  /45 (BP Location: Right arm, Patient Position: Chair, Cuff Size: Adult Regular)   Pulse 80   Ht 1.785 m (5' 10.28\")   Wt 87.8 kg (193 lb 8 oz)   SpO2 100%   BMI 27.55 kg/m    General: appears comfortable, alert and articulate  Head: normocephalic, atraumatic  Eyes: anicteric sclera, EOMI  Neck: no adenopathy  Orophyarynx: moist mucosa, no lesions, dentition intact  Heart: regular, S1/S2, no murmur, gallop, rub, estimated JVP 8cm  Lungs: clear, no rales or wheezing  Abdomen: soft, non-tender, bowel sounds present, no hepatosplenomegaly  Extremities: no clubbing, cyanosis or edema on L, prothesis on R.  Neurological: normal speech and affect, no gross motor deficits    Labs:  CBC RESULTS:  Lab Results   Component Value Date    WBC 5.6 01/19/2023    WBC 3.7 (L) 06/26/2021    RBC 3.73 (L) 01/19/2023    RBC 3.09 (L) 06/26/2021    HGB 10.8 (L) 01/19/2023    HGB 9.6 (L) 06/26/2021    HCT 36.1 (L) 01/19/2023    HCT 28.6 (L) 06/26/2021    MCV 97 01/19/2023    MCV 93 06/26/2021    MCH 29.0 01/19/2023    MCH 31.1 06/26/2021    MCHC 29.9 (L) 01/19/2023    MCHC 33.6 06/26/2021    RDW 14.8 01/19/2023    RDW 14.6 06/26/2021     (L) 01/19/2023     (L) 06/26/2021       CMP RESULTS:  Lab Results   Component Value Date     03/01/2023     06/28/2021    POTASSIUM 4.3 03/01/2023    POTASSIUM 3.8 11/22/2022    POTASSIUM 4.1 06/28/2021    CHLORIDE 105 03/01/2023    CHLORIDE 107 11/22/2022    CHLORIDE 101 12/30/2021    CHLORIDE 109 06/28/2021    CO2 22 03/01/2023    CO2 16 (L) 11/22/2022    CO2 24 06/28/2021    ANIONGAP 12 03/01/2023    ANIONGAP 11 11/22/2022    ANIONGAP 4 06/28/2021    GLC " 252 (H) 2023     (H) 2022     (H) 2022     (H) 2021    BUN 31.5 (H) 2023    BUN 34 (H) 2022    BUN 43 (H) 2021    CR 1.77 (H) 2023    CR 1.33 (H) 2021    GFRESTIMATED 38 (L) 2023    GFRESTIMATED >60 2021    GFRESTIMATED 51 (L) 2021    GFRESTBLACK >60 2021    GFRESTBLACK 59 (L) 2021    JUSTINO 9.2 2023    JUSTINO 9.4 2021    BILITOTAL 0.7 2022    BILITOTAL 0.9 2021    ALBUMIN 3.3 (L) 2022    ALBUMIN 3.3 (L) 2021    ALKPHOS 66 2022    ALKPHOS 72 2021    ALT <9 2022    ALT 24 2021    AST 39 2022    AST 34 2021        INR RESULTS:  Lab Results   Component Value Date    INR 1.05 2021       Lab Results   Component Value Date    MAG 1.6 (L) 2022    MAG 2.0 2021     Lab Results   Component Value Date    NTBNPI 7,364 (H) 2021     Lab Results   Component Value Date    NTBNP 2,136 (H) 2022    NTBNP 22,172 (H) 2018       Assessment and Plan:   1. Chronic systolic heart failure secondary to ICM.    Stage C  NYHA Class III  ARNI - yes  BB yes  Aldosterone antagonist yes  SCD prophylaxis patient declined - has CRT-P  % BiV pacin.9   Fluid status euvolemic  NSAID use: no      2. CLL : managed per oncology     3. CAD: S/P CAGB ; no anginal sx; on ASA/Statin/BB     4. PVD; S/P iliac stent - S/P R BKA     5. HTN: good control on current regime     6. DM: managed by PCP - On Jardiance     7. HLD: on statin      8. A.flutter: On Eliqouis/BB     9. CKD3:Multifactoral - HTN/DM/ICM:  Creat /GFR at baseline    Follow-up 4  months with Dr. Valdes      CC  Patient Care Team:  Gene Guevara MD as PCP - Verónica Alvarado RN as Nurse Coordinator (Cardiology)  Sugey Levi APRN CNP as Nurse Practitioner (Cardiology)  Clarisse Valdes MD as MD (Cardiovascular Disease)  Maxwell, Taylor Gonzalez, RN as Specialty Care Coordinator  (Cardiology)  Liset Felton, RN as Specialty Care Coordinator  Cathi Gomez, RN as Specialty Care Coordinator (Cardiology)  Lindsay Shearer, OFELIA CNP as Nurse Practitioner (Interventional Cardiology)  Carly Lomas PA-C as Assigned Heart and Vascular Provider  Denny Ruiz, PharmD as Pharmacist (Pharmacist)  Ender Hendrickson, CHRISTINE (Optometry)  Ender Hendrickson OD as Assigned Surgical Provider  Denny Ruiz, PharmD as Assigned MTM Pharmacist  Esthela Jesus, RN as Specialty Care Coordinator (Cardiology)  Geoff Landeros MD as MD (Cardiovascular Disease)  Bryn Mercer, APRN CNP as Nurse Practitioner (Cardiovascular Disease)  BRYN MERCER

## 2023-03-01 NOTE — NURSING NOTE
Chief Complaint   Patient presents with     Follow Up     82 year old male with chronic systolic heart failure presents for follow up with labs prior       Vitals were taken and medications reconciled.    Dane Sandoval, EMT  4:00 PM

## 2023-03-01 NOTE — PATIENT INSTRUCTIONS
Take your medicines every day, as directed    Changes made today:  No changes     Monitor Your Weight and Symptoms    Contact us if you:    Gain 2 pounds in one day or 5 pounds in one week  Feel more short of breath  Notice more leg swelling  Feel lightheadeded   Change your lifestyle    Limit Salt or Sodium:  2000 mg  Limit Fluids:  2000 mL or approximately 64 ounces  Eat a Heart Healthy Diet  Low in saturated fats  Stay Active:  Aim to move at least 150 minutes every  week         To Contact us    During Business Hours:  [phone], option # 1      After hours, weekends or holidays:   [phone], Option #4  Ask to speak to the On-Call Cardiologist. Inform them you are a CORE/heart failure patient at the [facility].     Use [facility] allows you to communicate directly with your heart team through secure messaging.  [facility] can be accessed any time on your phone, computer, or tablet.  If you need assistance, we'd be happy to help!         Keep your Heart Appointments:    Follow up with Dr Valdes in 4 months     Please consider attending our virtual support group which is held monthly. Please reach out to [facility] at [phone] for more information if you are interested in attending.       2023 dates:    Monday, March 6th , 1-2pm (Topic: Medications Review)  Monday, April 3rd, 1-2pm (Topic: Remote monitoring)  Monday, May 1st, 1-2pm   Monday, June 5th, 1-2pm  Monday, July 3rd, 1-2pm  Monday, August 7th, 1-2pm  Monday, September 11th, 1-2pm  Monday, October 2nd, 1-2pm  Monday, November 6th, 1-2pm  Monday, December 4th, 1-2pm

## 2023-03-01 NOTE — LETTER
3/1/2023      RE: Tad Manning  [address] [place] [id number]       Dear Colleague,    Thank you for the opportunity to participate in the care of your patient, Tad Manning, at the [facility] [place] [facility]. Please see a copy of my visit note below.    HPI: 82 yr old male presents to [facility] clinic for follow up of systolic heart failure due to ICM with EF 30-35%. He was last seen in [facility] in October, and had an ER visit in November due to influenza A. He otherwise is stable and lives in assisted living. He is accompanied by his niece today. 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   Pt denies SOB, orthopnea, PND, chest pain, belly bloating, loss of appetite, LE edema. Pt states energy level is good. He has no concerns today other than wanting to assure [facility] is managing his pacemaker. He had a remote check done yesterday by [facility].       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)      Chronic systolic heart failure (H) 6/6/2017     Confusion 6/20/2021     Coronary artery disease     triple bypass 1995     CRF (chronic renal failure), stage 3 (moderate) (H) 1/20/2017     Critical lower limb ischemia (H) 1/10/2014      Diabetes mellitus (H)      Diabetes mellitus, type 2 (H) 2014     Diabetic polyneuropathy (H)      Discitis 2017     Epidural abscess 2017     Fall 2017     History of blood transfusion      HTN (hypertension) 2014     Hyperglycemia 2020     Hyperlipidaemia      Hyperlipidemia with target LDL less than 100 2014    Formatting of this note might be different from the original. Overview:  Diagnosis updated by automated process. Provider to review and confirm. Formatting of this note might be different from the original. Diagnosis updated by automated process. Provider to review and confirm.     Hypertension      Long term current use of anticoagulant therapy 2018     Non-healing ulcer of foot (H)     Right     Noninflammatory pericardial effusion      Osteomyelitis (H) 2014     Osteomyelitis of foot, right, acute (H) 2/15/2017     Osteomyelitis of left foot (H)      PVD (peripheral vascular disease) (H)      Recurrent falls 2021     Sebaceous cyst        FAMILY HISTORY:  Family History   Problem Relation Age of Onset     Cancer Mother         leukemia     Leukemia Mother        SOCIAL HISTORY:  Social History     Socioeconomic History     Marital status: Single     Spouse name: None     Number of children: None     Years of education: None     Highest education level: None   Tobacco Use     Smoking status: Former     Packs/day: 2.00     Years: 30.00     Pack years: 60.00     Types: Cigarettes     Quit date: 1995     Years since quittin.1     Smokeless tobacco: Never   Substance and Sexual Activity     Alcohol use: No     Drug use: No   Social History Narrative    Lives independently with SO. 2017        CURRENT MEDICATIONS:  Current Outpatient Medications   Medication Sig Dispense Refill     alcohol swab prep pads Use to swab area of injection/jina as directed. 100 each 3     apixaban ANTICOAGULANT (ELIQUIS ANTICOAGULANT) 5 MG tablet Take 1 tablet (5 mg) by  mouth 2 times daily 180 tablet 3     atorvastatin (LIPITOR) 20 MG tablet Take 1 tablet (20 mg) by mouth daily 90 tablet 0     Mahsa Protect (EUCERIN) external cream Apply topically 2 times daily as needed for dry skin or other Apply as needed to intact stage 1 pressure area and/or irritated skin, rash or excoriation. Apply to legs.       bisacodyl (DULCOLAX) 10 MG suppository Place 10 mg rectally daily as needed for constipation If no BM or small BM for 3 days/9 shifts       blood glucose (NO BRAND SPECIFIED) test strip Use to test blood sugar 4 times daily or as directed. 100 strip 6     blood glucose calibration (NO BRAND SPECIFIED) solution 1 drop every 14 days Use 1 drop to calibrate blood glucose monitor every 14 days 1 Bottle 3     blood glucose monitoring (NO BRAND SPECIFIED) meter device kit Use to test blood sugar 4 times daily or as directed. 1 kit 0     cholecalciferol 1000 UNITS TABS Take 1,000 Units by mouth daily 30 tablet      citalopram (CELEXA) 20 MG tablet Take 20 mg by mouth every morning        digoxin (LANOXIN) 125 MCG tablet Take 1 tablet (125 mcg) by mouth daily 90 tablet 3     empagliflozin (JARDIANCE) 10 MG TABS tablet Take 1 tablet (10 mg) by mouth daily 90 tablet 1     furosemide (LASIX) 20 MG tablet Take 1 tablet (20 mg) by mouth daily 90 tablet 3     insulin glargine (LANTUS PEN) 100 UNIT/ML pen Inject 50 Units Subcutaneous At Bedtime       insulin lispro (HUMALOG KWIKPEN) 100 UNIT/ML (1 unit dial) KWIKPEN Inject 16 Units Subcutaneous 2 times daily (before meals) Breakfast and lunch       insulin lispro (HUMALOG KWIKPEN) 100 UNIT/ML (1 unit dial) KWIKPEN Inject 18 Units Subcutaneous daily (with dinner)       loperamide (IMODIUM A-D) 2 MG tablet Take 2-4 mg by mouth 4 times daily as needed for diarrhea Take 4 mg after 1st loose stool, then take 1 tablet as needed after each loose stool       metFORMIN (GLUCOPHAGE-XR) 750 MG 24 hr tablet Take 750 mg by mouth every morning       metoprolol  succinate ER (TOPROL XL) 100 MG 24 hr tablet Take 2 tablets (200 mg) by mouth daily 180 tablet 3     nitroglycerin (NITROSTAT) 0.4 MG sublingual tablet For chest pain place 1 tablet under the tongue every 5 minutes for 3 doses. If symptoms persist 5 minutes after 1st dose call 911. 25 tablet      nystatin (MYCOSTATIN) 142031 UNIT/GM external powder Apply topically 2 times daily as needed (to affected area(s) as needed)       pantoprazole (PROTONIX) 40 MG EC tablet Take 40 mg by mouth daily       polyethylene glycol (MIRALAX) 17 GM/Dose powder Take 17 g by mouth daily as needed 510 g 0     sacubitril-valsartan (ENTRESTO)  MG per tablet Take 1 tablet by mouth 2 times daily 180 tablet 3     spironolactone (ALDACTONE) 25 MG tablet Take 1 tablet (25 mg) by mouth daily 90 tablet 3     thin (NO BRAND SPECIFIED) lancets 1 each 4 times daily Use with lanceting device. 100 each 1     traZODone (DESYREL) 50 MG tablet Take 50 mg by mouth At Bedtime       triamcinolone (KENALOG) 0.1 % external cream 2 times daily as needed Apply topically to right stump, left hand and right forearm twice daily as needed for rash       ULTICARE SHORT 31G X 8 MM insulin pen needle Inject 1 each Subcutaneous 4 times daily       acetaminophen (TYLENOL) 325 MG tablet Take 650 mg by mouth every 4 hours as needed Max 3gm per day for minor pain/fever (Patient not taking: Reported on 3/1/2023)       aspirin (ASA) 81 MG chewable tablet Take 81 mg by mouth daily (Patient not taking: Reported on 3/1/2023)       magnesium hydroxide (MILK OF MAGNESIA) 400 MG/5ML suspension Take 30 mLs by mouth daily as needed for constipation or heartburn If no BM/small BM for 3 days/9 shifts (Patient not taking: Reported on 3/1/2023)       order for DME Equipment to be ordered:  Scale- Qty 1  Commander Flex - Qty. 1  Pulse-Oximeter - Qty. 1  Use as directed (Patient not taking: Reported on 3/1/2023) 1 Units 0     order for DME Equipment being ordered: DH2 shoe (Patient  "not taking: Reported on 3/1/2023) 1 Device 0     venetoclax (VENCLEXTA) 100 MG tablet Take 200 mg by mouth every morning (Patient not taking: Reported on 3/1/2023)         ROS:   Constitutional: No fever, chills, or sweats. No weight gain/loss.   ENT: No visual disturbance, ear ache, epistaxis, sore throat.   Allergies/Immunologic: Negative.   Respiratory: No cough, hemoptysis.   Cardiovascular: As per HPI.   GI: No nausea, vomiting, hematemesis, melena, or hematochezia.   : No urinary frequency, dysuria, or hematuria.   Integument: Negative.   Psychiatric: Negative.   Neuro: Negative.   Endocrinology: Negative.   Musculoskeletal: Negative.    EXAM:  /45 (BP Location: Right arm, Patient Position: Chair, Cuff Size: Adult Regular)   Pulse 80   Ht 1.785 m (5' 10.28\")   Wt 87.8 kg (193 lb 8 oz)   SpO2 100%   BMI 27.55 kg/m    General: appears comfortable, alert and articulate  Head: normocephalic, atraumatic  Eyes: anicteric sclera, EOMI  Neck: no adenopathy  Orophyarynx: moist mucosa, no lesions, dentition intact  Heart: regular, S1/S2, no murmur, gallop, rub, estimated JVP 8cm  Lungs: clear, no rales or wheezing  Abdomen: soft, non-tender, bowel sounds present, no hepatosplenomegaly  Extremities: no clubbing, cyanosis or edema on L, prothesis on R.  Neurological: normal speech and affect, no gross motor deficits    Labs:  CBC RESULTS:  Lab Results   Component Value Date    WBC 5.6 01/19/2023    WBC 3.7 (L) 06/26/2021    RBC 3.73 (L) 01/19/2023    RBC 3.09 (L) 06/26/2021    HGB 10.8 (L) 01/19/2023    HGB 9.6 (L) 06/26/2021    HCT 36.1 (L) 01/19/2023    HCT 28.6 (L) 06/26/2021    MCV 97 01/19/2023    MCV 93 06/26/2021    MCH 29.0 01/19/2023    MCH 31.1 06/26/2021    MCHC 29.9 (L) 01/19/2023    MCHC 33.6 06/26/2021    RDW 14.8 01/19/2023    RDW 14.6 06/26/2021     (L) 01/19/2023     (L) 06/26/2021       CMP RESULTS:  Lab Results   Component Value Date     03/01/2023     06/28/2021 "    POTASSIUM 4.3 2023    POTASSIUM 3.8 2022    POTASSIUM 4.1 2021    CHLORIDE 105 2023    CHLORIDE 107 2022    CHLORIDE 101 2021    CHLORIDE 109 2021    CO2 22 2023    CO2 16 (L) 2022    CO2 24 2021    ANIONGAP 12 2023    ANIONGAP 11 2022    ANIONGAP 4 2021     (H) 2023     (H) 2022     (H) 2022     (H) 2021    BUN 31.5 (H) 2023    BUN 34 (H) 2022    BUN 43 (H) 2021    CR 1.77 (H) 2023    CR 1.33 (H) 2021    GFRESTIMATED 38 (L) 2023    GFRESTIMATED >60 2021    GFRESTIMATED 51 (L) 2021    GFRESTBLACK >60 2021    GFRESTBLACK 59 (L) 2021    JUSTINO 9.2 2023    JUSTINO 9.4 2021    BILITOTAL 0.7 2022    BILITOTAL 0.9 2021    ALBUMIN 3.3 (L) 2022    ALBUMIN 3.3 (L) 2021    ALKPHOS 66 2022    ALKPHOS 72 2021    ALT <9 2022    ALT 24 2021    AST 39 2022    AST 34 2021        INR RESULTS:  Lab Results   Component Value Date    INR 1.05 2021       Lab Results   Component Value Date    MAG 1.6 (L) 2022    MAG 2.0 2021     Lab Results   Component Value Date    NTBNPI 7,364 (H) 2021     Lab Results   Component Value Date    NTBNP 2,136 (H) 2022    NTBNP 22,172 (H) 2018       Assessment and Plan:   1. Chronic systolic heart failure secondary to ICM.    Stage C  NYHA Class III  ARNI - yes  BB yes  Aldosterone antagonist yes  SCD prophylaxis patient declined - has CRT-P  % BiV pacin.9   Fluid status euvolemic  NSAID use: no      2. CLL : managed per oncology     3. CAD: S/P CAGB ; no anginal sx; on ASA/Statin/BB     4. PVD; S/P iliac stent - S/P R BKA     5. HTN: good control on current regime     6. DM: managed by PCP - On Jardiance     7. HLD: on statin      8. A.flutter: On Eliqouis/BB     9. CKD3:Multifactoral - HTN/DM/ICM:  Creat  /GFR at baseline    Follow-up 4  months with Dr. Valdes    Please do not hesitate to contact me if you have any questions/concerns.     Sincerely,     Zeinab Mercer, OFELIA CNP    CC  Patient Care Team:  Gene Guevara MD as PCP - General  YaleVerónica, RN as Nurse Coordinator (Cardiology)  Sugey Levi APRN CNP as Nurse Practitioner (Cardiology)  Clarisse Valdes MD as MD (Cardiovascular Disease)  Taylor Arreola, RN as Specialty Care Coordinator (Cardiology)  Liset Felton RN as Specialty Care Coordinator  Cathi Gomez, RN as Specialty Care Coordinator (Cardiology)  Lindsay Shearer APRN CNP as Nurse Practitioner (Interventional Cardiology)  Carly Lomas PA-C as Assigned Heart and Vascular Provider  Denny Ruiz, PharmD as Pharmacist (Pharmacist)  Ender Hendrickson OD as Assigned Surgical Provider  Esthela Jesus, RN as Specialty Care Coordinator (Cardiology)  Geoff Landeros MD as MD (Cardiovascular Disease)

## 2023-03-03 NOTE — PROGRESS NOTES
Called assisted living to review medication lists. No answer. Unable to leave message. Will try again on Monday.

## 2023-04-03 NOTE — CONFIDENTIAL NOTE
Date: 4/3/2023    Time of Call: 2:46 PM     Diagnosis:  Heart Failure      [ VORB ] Ordering provider: Zeinab Mercer NP    Order: lasix 20 mg BID for 2 days and return to 20 mg daily      Order received by: Taylor Arreola RN       Follow-up/additional notes: orders faxed to assisted living.

## 2023-04-03 NOTE — TELEPHONE ENCOUNTER
Reached out to Assisted Living.  They report that Hima has has some increased confusion and 2 falls in the last few days.  They do not weigh him daily (it would be a charge to family if they did) but do note some 1+ edema.  They have not noted shortness of breath.  /79

## 2023-04-03 NOTE — TELEPHONE ENCOUNTER
Date: 4/3/2023    Time of Call: 3:10 PM     Diagnosis:  Heart failure     [ VORB ] Ordering provider: Zeinab Mercer NP    Order: Lasix 20 mg BID for 2 days, then return to 20 mg daily      Order received by: Taylor Arreola RN       Follow-up/additional notes: [facility]ed orders to [facility], rani Cook who reported that Hima would be seeing the [facility] MD on Wednesday and she would let us know how Hima was feeling.

## 2023-05-30 NOTE — TELEPHONE ENCOUNTER
Called Joyce pena. Left message and asked for call back. Will try her back later today or tomorrow to follow up.

## 2023-05-30 NOTE — TELEPHONE ENCOUNTER
[facility]    Phone Message    May a detailed message be left on voicemail: yes     Reason for Call: Other: Per call from Joyce, reporting changes in patient's health. He has been experiencing labor breathing, elevated glucose level, he is diabetic, and anxiety. He was able to walk before but now can't walk more than 20 feet without taking a break. Per Joyce patient is going to have X-ray today and assess, if unstable, he will be sent to the ER. Joyce is requesting a call back to discuss plans.      Action Taken: Message routed to:  [facility] Cardio    Travel Screening: Not Applicable

## 2023-05-31 NOTE — TELEPHONE ENCOUNTER
Called Joyce back.  Reports Hima has a lot going on right now.   Has neuropathy and a longtime wound on his foot that is finally healing so he has now more mobile, then he got COVID.   Last week Joyce noted that he can't walk from his apartment to elevator without stopping to take a break, which is new. It's maybe 50 feet.   [facility] - he was over at their house and they all noticed how labored his breathing was, and glucose levels were in 300s.  Physician at [facility] was seeing him today. Got a chest xray and was going to see him today.   Weight - was 194 lbs, now 197.4. /48, pulse 86, 94% on RA.   Taking Lasix 20 mg daily.   Getting labs tomorrow, though got recent labs 5/25 and they are copied below.

## 2023-06-09 NOTE — ED NOTES
More fatigue over pastmonth.  Using wheelchair more often.  Denies blood loss.  On eliquis for a fib

## 2023-06-09 NOTE — ED NOTES
Expected Patient Referral to ED  2:06 PM    Referring Clinic/Provider:  [facility]     Reason for referral/Clinical facts:  Coming from assisted living facility, recently hospitalized in December for influenza/cardiac issues.  Pt has CLL, has been monitoring without treatment with his oncologist.  Over the past week, increased weakness/fatigue.  Pt recently evaluated by hospice, but family now asking to meet with his cardiologist and other primary providers to see how to proceed.  Pt already en route to facility with family.  I'm not entirely clear why pt is coming to ED, other than to get an additional workup for his symptoms and discuss whether he will proceed with hospice or additional cares.      Recommendations provided:  Send to ED for further evaluation    Caller was informed that this institution does possess the capabilities and/or resources to provide for patient and should be transferred to our facility.    Discussed that if direct admit is sought and any hurdles are encountered, this ED would be happy to see the patient and evaluate.    Informed caller that recommendations provided are recommendations based only on the facts provided and that they responsible to accept or reject the advice, or to seek a formal in person consultation as needed and that this ED will see/treat patient should they arrive.      Norberto Katz MD  [facility] EMERGENCY ROOM  [address] [facility] [address] [phone]       Norberto Katz MD  06/09/23 3400

## 2023-06-09 NOTE — ED PROVIDER NOTES
EMERGENCY DEPARTMENT ENCOUNTER      NAME: Tad Manning  AGE: 82 year old male  YOB: 1940  MRN: [id number]  EVALUATION DATE & TIME: 6/9/2023  3:01 PM    PCP: Gene Guevara    ED PROVIDER: Claude Tyler M.D.      Chief Complaint   Patient presents with     Fatigue     Pallor           IMPRESSION  1. Generalized weakness    2. History of CHF (congestive heart failure)    3. Chronic systolic heart failure (H)    4. 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 Reports several weeks of ongoing generalized weakness & shortness of breath with exertion; no pain, cough, fevers, sweats, chills. Today was noted to be paler than usual per staff; thus sent to the ED. Patient denies any symptoms sitting here in ED bed. Denies any black or bloody stool, nausea, vomiting. States he has been taking his meds as prescribed.    Normal vitals on presentation. Calm on exam with mild diffuse pallor but moist mucous membranes, clear lungs, normal work of breathing, benign abdomen, s/p R BKA with LLE with trace nontender pitting edema, nonfocal neuro exam.    CHF certainly a possibility per history. Anemia on differential as well. Progression appears subacute per symptoms. Will obtain screening workup including EKG, blood, urine, CXR while continuing to monitor. Patient comfortable with this plan; no further questions at this time.   1809 Workup consistent with mild CHF; troponin within normal limits with unremarkable paced EKG--- doubt  ACS. No O2 requirement and able to walk with walker without desat. CXR with mild pulmonary edema; doubt bacterial pneumonia with no productive cough & negative procal. Viral swab negative. Labs with stable hemoglobin at 8.8, T bili just 1.4 with otherwise normal LFTs, baseline creatinine at 2.07 with no glaring electrolyte abnormality, no UTI.    Patient sitting up asymptomatic on recheck with normal vitals. No indication for hospitalization at this time. Already taking Lasix 20mg BID, will increased to 40mg qAM and 20mg qPM. Given extra Lasix here now to cover today; prefers to take PO and does not want IV. Patient & family comfortable with this plan and discharge home at this time. Return precautions and need for PCP follow up discussed and understood. No further questions at the time of discharge.       --------------------------------------------------------------------------------   --------------------------------------------------------------------------------     3:15 PM I met with the patient for the initial interview and physical examination. Discussed plan for treatment and workup in the ED.      This patient involved a high degree of complexity in medical decision making, as significant risks were present and assessed. Recent encounters & results in medical record reviewed by me.    All workup (i.e. any EKG/labs/imaging as per charting below) reviewed and independently interpreted by me. See respective sections for details.    Broad differential considered for this patient presenting, including but not limited to:  Anemia, liver failure, CHF, STORM, other electrolyte derangement, UTI, sepsis, pneumonia, ACS, arrhythmia, ICH      See additional MDM below if interested.      MEDICATIONS GIVEN IN THE EMERGENCY DEPARTMENT  Medications   furosemide (LASIX) tablet 20 mg (20 mg Oral $Given 6/9/23 1819)       NEW PRESCRIPTIONS STARTED AT TODAY'S ER VISIT  Discharge Medication List as of 6/9/2023  6:12 PM       CONTINUE these medications which have CHANGED    Details   !! furosemide (LASIX) 20 MG tablet Take 2 tablets (40 mg) by mouth every morning, Disp-90 tablet, R-3, E-Prescribe      !! furosemide (LASIX) 20 MG tablet Take 1 tablet (20 mg) by mouth every evening, Disp-30 tablet, R-0, E-Prescribe       !! - Potential duplicate medications found. Please discuss with provider.      CONTINUE these medications which have NOT CHANGED    Details   acetaminophen (TYLENOL) 325 MG tablet Take 650 mg by mouth every 4 hours as needed Max 3gm per day for minor pain/fever, Historical      alcohol swab prep pads Use to swab area of injection/jina as directed.Disp-100 each, [id number]      apixaban ANTICOAGULANT (ELIQUIS ANTICOAGULANT) 5 MG tablet Take 1 tablet (5 mg) by mouth 2 times daily, Disp-180 tablet, R-3, E-Prescribe      aspirin (ASA) 81 MG chewable tablet Take 81 mg by mouth daily, Historical      atorvastatin (LIPITOR) 20 MG tablet Take 1 tablet (20 mg) by mouth daily, Disp-90 tablet, R-0, E-Prescribe      Mahsa Protect (EUCERIN) external cream Apply topically 2 times daily as needed for dry skin or other Apply as needed to intact stage 1 pressure area and/or irritated skin, rash or excoriation. Apply to legs.Historical      bisacodyl (DULCOLAX) 10 MG suppository Place 10 mg rectally daily as needed for constipation If no BM or small BM for 3 days/9 shifts, Historical      blood glucose (NO BRAND SPECIFIED) test strip Use to test blood sugar 4 times daily or as directed., Disp-100 strip, R-6, E-Prescribe      blood glucose calibration (NO BRAND SPECIFIED) solution 1 drop every 14 days Use 1 drop to calibrate blood glucose monitor every 14 days, Disp-1 Bottle, R-3, E-Prescribe      blood glucose monitoring (NO BRAND SPECIFIED) meter device kit Use to test blood sugar 4 times daily or as directed.Disp-1 kit, [id number]      cholecalciferol 1000 UNITS TABS Take 1,000 Units by mouth daily, Disp-30 tablet, Transitional       citalopram (CELEXA) 20 MG tablet Take 20 mg by mouth every morning , Historical      digoxin (LANOXIN) 125 MCG tablet Take 1 tablet (125 mcg) by mouth daily, Disp-90 tablet, R-3, Historical      empagliflozin (JARDIANCE) 10 MG TABS tablet Take 1 tablet (10 mg) by mouth daily, Disp-90 tablet, R-1, E-PrescribeRunning test claim to find out cost, do not fill. Clinic will call to follow up      insulin glargine (LANTUS PEN) 100 UNIT/ML pen Inject 50 Units Subcutaneous At Bedtime, Historical      !! insulin lispro (HUMALOG KWIKPEN) 100 UNIT/ML (1 unit dial) KWIKPEN Inject 16 Units Subcutaneous 2 times daily (before meals) Breakfast and lunch, Historical      !! insulin lispro (HUMALOG KWIKPEN) 100 UNIT/ML (1 unit dial) KWIKPEN Inject 18 Units Subcutaneous daily (with dinner), Historical      loperamide (IMODIUM A-D) 2 MG tablet Take 2-4 mg by mouth 4 times daily as needed for diarrhea Take 4 mg after 1st loose stool, then take 1 tablet as needed after each loose stool, Historical      magnesium hydroxide (MILK OF MAGNESIA) 400 MG/5ML suspension Take 30 mLs by mouth daily as needed for constipation or heartburn If no BM/small BM for 3 days/9 shifts, Historical      metFORMIN (GLUCOPHAGE-XR) 750 MG 24 hr tablet Take 750 mg by mouth every morning, Historical      metoprolol succinate ER (TOPROL XL) 100 MG 24 hr tablet Take 2 tablets (200 mg) by mouth daily, Disp-180 tablet, R-3, E-Prescribe      nitroglycerin (NITROSTAT) 0.4 MG sublingual tablet For chest pain place 1 tablet under the tongue every 5 minutes for 3 doses. If symptoms persist 5 minutes after 1st dose call 911., Disp-25 tablet, Transitional      nystatin (MYCOSTATIN) 711043 UNIT/GM external powder Apply topically 2 times daily as needed (to affected area(s) as needed)Historical      !! order for DME Equipment to be ordered:  Scale- Qty 1  Commander Flex - Qty. 1  Pulse-Oximeter - Qty. 1  Use as directedDisp-1 Units, R-0, Local Print      !! order for DME  Equipment being ordered: DH2 shoeDisp-1 Device, R-0, Local Print      pantoprazole (PROTONIX) 40 MG EC tablet Take 40 mg by mouth daily, Historical      polyethylene glycol (MIRALAX) 17 GM/Dose powder Take 17 g by mouth daily as needed, Disp-510 g, R-0, Local Print      sacubitril-valsartan (ENTRESTO)  MG per tablet Take 1 tablet by mouth 2 times daily, Disp-180 tablet, R-3, E-Prescribe      spironolactone (ALDACTONE) 25 MG tablet Take 1 tablet (25 mg) by mouth daily, Disp-90 tablet, R-3, E-Prescribe      thin (NO BRAND SPECIFIED) lancets 1 each 4 times daily Use with lanceting device., Disp-100 each, R-1, E-Prescribe      traZODone (DESYREL) 50 MG tablet Take 50 mg by mouth At Bedtime, Historical      triamcinolone (KENALOG) 0.1 % external cream 2 times daily as needed Apply topically to right stump, left hand and right forearm twice daily as needed for rashHistorical      ULTICARE SHORT 31G X 8 MM insulin pen needle Inject 1 each Subcutaneous 4 times dailyDAWHistorical      venetoclax (VENCLEXTA) 100 MG tablet Take 200 mg by mouth every morning, Historical       !! - Potential duplicate medications found. Please discuss with provider.              =================================================================      HPI  Patient information was obtained from: Patient, family member    Use of : N/A        Tad Manning is a 82 year old male with a pertinent history of CLL, afib on Eliquis,  CHF, CAD, CKD3, DMII,  HTN, HLD, PVD, who presents to this ED via walk-in for evaluation of fatigue, skin color change.    Patient and family member report chronic fatigue that has been worsening, with patient stating he was unable to take a shower due to fatigue. Family member also notes that the patient's skin appears more yellow today. Patient lives in assisted living. He also endorses shortness of breath that is baseline.     Patient denies nausea, vomiting, diarrhea, blood in stool, and all other  complaints at this time.          --------------- MEDICAL HISTORY ---------------  PAST MEDICAL HISTORY:  Reviewed independently by me.  Past Medical History:   Diagnosis Date     ACS (acute coronary syndrome) (H) 6/20/2021     Acute respiratory failure with hypoxia (H) 4/6/2017     STORM (acute kidney injury) (H) 9/22/2018     Arthritis      ASCVD (arteriosclerotic cardiovascular disease)      Atrial fibrillation (H) 2/5/2018     Atrial flutter (H) 2/22/2017     BMI 30.0-30.9,adult      BPH (benign prostatic hypertrophy)      CAD S/P percutaneous coronary angioplasty 1/11/2014     Cellulitis      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)      Chronic systolic heart failure (H) 6/6/2017     Confusion 6/20/2021     Coronary artery disease     triple bypass 1995     CRF (chronic renal failure), stage 3 (moderate) (H) 1/20/2017     Critical lower limb ischemia (H) 1/10/2014     Diabetes mellitus (H)      Diabetes mellitus, type 2 (H) 1/11/2014     Diabetic polyneuropathy (H)      Discitis 2/8/2017     Epidural abscess 2/9/2017     Fall 2/7/2017     History of blood transfusion      HTN (hypertension) 1/11/2014     Hyperglycemia 1/9/2020     Hyperlipidaemia      Hyperlipidemia with target LDL less than 100 1/11/2014    Formatting of this note might be different from the original. Overview:  Diagnosis updated by automated process. Provider to review and confirm. Formatting of this note might be different from the original. Diagnosis updated by automated process. Provider to review and confirm.     Hypertension      Long term current use of anticoagulant therapy 2/5/2018     Non-healing ulcer of foot (H)     Right     Noninflammatory pericardial effusion      Osteomyelitis (H) 1/11/2014     Osteomyelitis of foot, right, acute (H) 2/15/2017     Osteomyelitis of left foot (H)      PVD (peripheral vascular disease) (H)      Recurrent falls 6/20/2021     Sebaceous cyst      Patient Active  Problem List   Diagnosis     Critical lower limb ischemia , s/p L AK pop to BK pop bypass with rGSV on 1/10/2014     DM type 2 w neuropathy -- Hgb A1C 9.0 on 9/22/18     HTN (hypertension)     CAD -- S/P CABG     Hyperlipidemia with target LDL less than 100     Osteomyelitis left 2nd Toe s/p amputaion 1/10/14     CRF (chronic renal failure), stage 3 (moderate) (H)     Fall     Discitis     Epidural abscess     Osteomyelitis Right Foot -- S/P BKA 10/11/18     Atrial flutter (H)     Acute respiratory failure with hypoxia (H)     Chronic systolic heart failure (H)     Atrial fibrillation -- on Warfarin     Long-term (current) use of anticoagulants [Z79.01]     CAD S/P percutaneous coronary angioplasty     STORM (acute kidney injury) (H)     Hyperglycemia     Paroxysmal atrial fibrillation (H)     Confusion     ACS (acute coronary syndrome) (H)     Recurrent falls     Hypoxia     Acute on chronic congestive heart failure, unspecified heart failure type (H)     Onychomycosis     PAD (peripheral artery disease) (H)       PAST SURGICAL HISTORY:  Reviewed independently by me.  Past Surgical History:   Procedure Laterality Date     AMPUTATE LEG BELOW KNEE Right 10/8/2018    Procedure: AMPUTATE LEG BELOW KNEE;  OPEN RIGHT LOWER LEG AMPUTATION WITH WOUND VAC PLACEMENT    EBL: 50mL;  Surgeon: Amador Vick MD;  Location:  OR     AMPUTATE REVISION STUMP LOWER EXTREMITY Right 10/11/2018    Procedure: AMPUTATE REVISION STUMP LOWER EXTREMITY;  CLOSURE RIGHT BELOW KNEE AMPUTATION;  Surgeon: Amador Vick MD;  Location:  OR     AMPUTATE TOE(S)  1/10/2014    Procedure: AMPUTATE TOE(S);;  Surgeon: Amador Vick MD;  Location:  OR     APPLY WOUND VAC Right 10/8/2018    Procedure: APPLY WOUND VAC;;  Surgeon: Amador Vick MD;  Location:  OR     BONE MARROW BIOPSY, BONE SPECIMEN, NEEDLE/TROCAR  10/19/2012    Procedure: BIOPSY BONE MARROW;  BONE MARROW BIOPSY  (CONSCIOUS SED);  Surgeon: Ramon Quesada MD;   Location:  GI     BYPASS GRAFT FEMOROPOPLITEAL  1/10/2014    Procedure: BYPASS GRAFT FEMOROPOPLITEAL;  Left above knee popliteal to  Below knee popliteal bypass using transverse saphenous vein graft. Left 2nd Toe amputation;  Surgeon: Amador Vick MD;  Location:  OR     CARDIAC SURGERY      angioplasty with stent, triple bypass     CV ANGIOGRAM CORONARY GRAFT N/A 6/22/2021    Procedure: Angiogram Coronary Graft;  Surgeon: Sury Sorenson MD;  Location:  HEART CARDIAC CATH LAB     CV HEART CATHETERIZATION WITH POSSIBLE INTERVENTION N/A 6/22/2021    Procedure: Heart Catheterization with Possible Intervention;  Surgeon: Sury Sorenson MD;  Location:  HEART CARDIAC CATH LAB     EXCISE CYST GENERIC (LOCATION) N/A 2/1/2016    Procedure: EXCISE CYST GENERIC (LOCATION);  Surgeon: Amador Vick MD;  Location:  [facility]     GRAFT VEIN FROM EXTREMITY (LOCATION)  1/10/2014    Procedure: GRAFT VEIN FROM EXTREMITY (LOCATION);;  Surgeon: Amador Vick MD;  Location:  OR     LAMINECTOMY THORACIC ONE LEVEL N/A 2/8/2017    Procedure: LAMINECTOMY THORACIC ONE LEVEL;  Surgeon: Marcelo Trent MD;  Location: [facility]     OPTICAL TRACKING SYSTEM FUSION POSTERIOR SPINE THORACIC THREE+ LEVELS N/A 2/12/2017    Procedure: OPTICAL TRACKING SYSTEM FUSION POSTERIOR SPINE THORACIC THREE+ LEVELS;  Surgeon: Marcelo Trent MD;  Location: [facility]     OTHER SURGICAL HISTORY  06/22/2021    CV HEART CATH      OTHER SURGICAL HISTORY  06/22/2021    CV ANGIOGRAM CORONARY GRAFT     OTHER SURGICAL HISTORY  10/11/2018    AMPUTATE REVISION STUMP LOWER EXTREMITY     OTHER SURGICAL HISTORY  10/08/2018    APPLY WOUND VAC     OTHER SURGICAL HISTORY  10/08/2018    AMPUTATE LEG BELOW KNEE     OTHER SURGICAL HISTORY  02/12/2017    OPTICAL TRACKING SYSTEM FUSION POSTERIOR SPINE THORACIC THREE + LEVELS     TONSILLECTOMY       VASCULAR SURGERY      ptcr legs       CURRENT MEDICATIONS:    Reviewed independently by me.  No current  facility-administered medications for this encounter.    Current Outpatient Medications:      furosemide (LASIX) 20 MG tablet, Take 2 tablets (40 mg) by mouth every morning, Disp: 90 tablet, Rfl: 3     furosemide (LASIX) 20 MG tablet, Take 1 tablet (20 mg) by mouth every evening, Disp: 30 tablet, Rfl: 0     acetaminophen (TYLENOL) 325 MG tablet, Take 650 mg by mouth every 4 hours as needed Max 3gm per day for minor pain/fever (Patient not taking: Reported on 3/1/2023), Disp: , Rfl:      alcohol swab prep pads, Use to swab area of injection/jina as directed., Disp: 100 each, Rfl: 3     apixaban ANTICOAGULANT (ELIQUIS ANTICOAGULANT) 5 MG tablet, Take 1 tablet (5 mg) by mouth 2 times daily, Disp: 180 tablet, Rfl: 3     aspirin (ASA) 81 MG chewable tablet, Take 81 mg by mouth daily (Patient not taking: Reported on 3/1/2023), Disp: , Rfl:      atorvastatin (LIPITOR) 20 MG tablet, Take 1 tablet (20 mg) by mouth daily, Disp: 90 tablet, Rfl: 0     Mahsa Protect (EUCERIN) external cream, Apply topically 2 times daily as needed for dry skin or other Apply as needed to intact stage 1 pressure area and/or irritated skin, rash or excoriation. Apply to legs., Disp: , Rfl:      bisacodyl (DULCOLAX) 10 MG suppository, Place 10 mg rectally daily as needed for constipation If no BM or small BM for 3 days/9 shifts, Disp: , Rfl:      blood glucose (NO BRAND SPECIFIED) test strip, Use to test blood sugar 4 times daily or as directed., Disp: 100 strip, Rfl: 6     blood glucose calibration (NO BRAND SPECIFIED) solution, 1 drop every 14 days Use 1 drop to calibrate blood glucose monitor every 14 days, Disp: 1 Bottle, Rfl: 3     blood glucose monitoring (NO BRAND SPECIFIED) meter device kit, Use to test blood sugar 4 times daily or as directed., Disp: 1 kit, Rfl: 0     cholecalciferol 1000 UNITS TABS, Take 1,000 Units by mouth daily, Disp: 30 tablet, Rfl:      citalopram (CELEXA) 20 MG tablet, Take 20 mg by mouth every morning , Disp: ,  Rfl:      digoxin (LANOXIN) 125 MCG tablet, Take 1 tablet (125 mcg) by mouth daily, Disp: 90 tablet, Rfl: 3     empagliflozin (JARDIANCE) 10 MG TABS tablet, Take 1 tablet (10 mg) by mouth daily, Disp: 90 tablet, Rfl: 1     insulin glargine (LANTUS PEN) 100 UNIT/ML pen, Inject 50 Units Subcutaneous At Bedtime, Disp: , Rfl:      insulin lispro (HUMALOG KWIKPEN) 100 UNIT/ML (1 unit dial) KWIKPEN, Inject 16 Units Subcutaneous 2 times daily (before meals) Breakfast and lunch, Disp: , Rfl:      insulin lispro (HUMALOG KWIKPEN) 100 UNIT/ML (1 unit dial) KWIKPEN, Inject 18 Units Subcutaneous daily (with dinner), Disp: , Rfl:      loperamide (IMODIUM A-D) 2 MG tablet, Take 2-4 mg by mouth 4 times daily as needed for diarrhea Take 4 mg after 1st loose stool, then take 1 tablet as needed after each loose stool, Disp: , Rfl:      magnesium hydroxide (MILK OF MAGNESIA) 400 MG/5ML suspension, Take 30 mLs by mouth daily as needed for constipation or heartburn If no BM/small BM for 3 days/9 shifts (Patient not taking: Reported on 3/1/2023), Disp: , Rfl:      metFORMIN (GLUCOPHAGE-XR) 750 MG 24 hr tablet, Take 750 mg by mouth every morning, Disp: , Rfl:      metoprolol succinate ER (TOPROL XL) 100 MG 24 hr tablet, Take 2 tablets (200 mg) by mouth daily, Disp: 180 tablet, Rfl: 3     nitroglycerin (NITROSTAT) 0.4 MG sublingual tablet, For chest pain place 1 tablet under the tongue every 5 minutes for 3 doses. If symptoms persist 5 minutes after 1st dose call 911., Disp: 25 tablet, Rfl:      nystatin (MYCOSTATIN) 743815 UNIT/GM external powder, Apply topically 2 times daily as needed (to affected area(s) as needed), Disp: , Rfl:      order for DME, Equipment to be ordered: Scale- Qty 1 Commander Flex - Qty. 1 Pulse-Oximeter - Qty. 1 Use as directed (Patient not taking: Reported on 3/1/2023), Disp: 1 Units, Rfl: 0     order for DME, Equipment being ordered: 2 shoe (Patient not taking: Reported on 3/1/2023), Disp: 1 Device, Rfl: 0      pantoprazole (PROTONIX) 40 MG EC tablet, Take 40 mg by mouth daily, Disp: , Rfl:      polyethylene glycol (MIRALAX) 17 GM/Dose powder, Take 17 g by mouth daily as needed, Disp: 510 g, Rfl: 0     sacubitril-valsartan (ENTRESTO)  MG per tablet, Take 1 tablet by mouth 2 times daily, Disp: 180 tablet, Rfl: 3     spironolactone (ALDACTONE) 25 MG tablet, Take 1 tablet (25 mg) by mouth daily, Disp: 90 tablet, Rfl: 3     thin (NO BRAND SPECIFIED) lancets, 1 each 4 times daily Use with lanceting device., Disp: 100 each, Rfl: 1     traZODone (DESYREL) 50 MG tablet, Take 50 mg by mouth At Bedtime, Disp: , Rfl:      triamcinolone (KENALOG) 0.1 % external cream, 2 times daily as needed Apply topically to right stump, left hand and right forearm twice daily as needed for rash, Disp: , Rfl:      ULTICARE SHORT 31G X 8 MM insulin pen needle, Inject 1 each Subcutaneous 4 times daily, Disp: , Rfl:      venetoclax (VENCLEXTA) 100 MG tablet, Take 200 mg by mouth every morning (Patient not taking: Reported on 3/1/2023), Disp: , Rfl:     ALLERGIES:  Reviewed independently by me.  Allergies   Allergen Reactions     Blood Transfusion Related (Informational Only) Other (See Comments)     Patient has a history of a clinically significant antibody against RBC antigens.  A delay in compatible RBCs may occur.      Blood-Group Specific Substance Other (See Comments)     Patient has a suggestive Warm Auto-antibody. Blood products may be delayed. Draw patient 24 hours prior to transfusion. Draw one red top and two purple top tubes for all type and screen orders.       FAMILY HISTORY:  Reviewed independently by me.  Family History   Problem Relation Age of Onset     Cancer Mother         leukemia     Leukemia Mother          SOCIAL HISTORY:   Reviewed independently by me.  Social History     Socioeconomic History     Marital status: Single   Tobacco Use     Smoking status: Former     Packs/day: 2.00     Years: 30.00     Pack years: 60.00      Types: Cigarettes     Quit date: 1995     Years since quittin.4     Smokeless tobacco: Never   Substance and Sexual Activity     Alcohol use: No     Drug use: No   Social History Narrative    Lives independently with SO. 2017        --------------- PHYSICAL EXAM ---------------  Nursing notes and vitals independently reviewed by me.  VITALS:  Vitals:    23 1410 23 1746   BP: 110/62 (!) 151/88   Pulse: 90 86   Resp: 20 (!) 31   Temp: 97.9  F (36.6  C)    SpO2: 99% 99%   Weight: 83.9 kg (185 lb)    Height: 1.829 m (6')        PHYSICAL EXAM:    General:  alert, interactive, no distress  Eyes:  conjunctivae clear, conjugate gaze  HENT:  atraumatic, nose with no rhinorrhea, oropharynx clear  Neck:  no meningismus  Cardiovascular:  HR 80s during exam, regular rhythm, no murmurs, brisk cap refill  Chest:  no chest wall tenderness  Pulmonary:  no stridor, normal phonation, normal work of breathing, clear lungs bilaterally  Abdomen:  soft, nondistended, nontender  :  no CVA tenderness  Back:  no midline spinal tenderness  Musculoskeletal: S/p right BKA, LLE with trace non tender pitting edema to knee with distal CMS intact.  Skin:  warm, dry, no rash, mild diffuse pallor  Neuro:  awake, alert, answers questions appropriately, follows commands, moves all limbs  Psych:  calm, normal affect      --------------- RESULTS ---------------  EKG:    Reviewed and independently interpreted by me.  - paced at 78bpm, no ST changes, , QTc 531  - paced rhythm replaced NSR from prior on 22  My read.    LAB:  Reviewed and independently interpreted by me.  Results for orders placed or performed during the hospital encounter of 23   XR Chest Port 1 View    Impression    IMPRESSION: No pleural fluid or pneumothorax. A right lower lung opacity could be airspace disease. Recommend follow-up imaging to resolution, perhaps in 4-6 weeks. The cardiomediastinal silhouette is enlarged. A left chest wall  biventricular pacemaker   is noted. There is surgical hardware in the spine.    Glucose by meter   Result Value Ref Range    GLUCOSE BY METER POCT 181 (H) 70 - 99 mg/dL   UA with Microscopic reflex to Culture    Specimen: Urine, Clean Catch   Result Value Ref Range    Color Urine Light Yellow Colorless, Straw, Light Yellow, Yellow    Appearance Urine Clear Clear    Glucose Urine 500 (A) Negative mg/dL    Bilirubin Urine Negative Negative    Ketones Urine Negative Negative mg/dL    Specific Gravity Urine 1.010 1.001 - 1.030    Blood Urine Negative Negative    pH Urine 5.0 5.0 - 7.0    Protein Albumin Urine 20 (A) Negative mg/dL    Urobilinogen Urine <2.0 <2.0 mg/dL    Nitrite Urine Negative Negative    Leukocyte Esterase Urine Negative Negative    Mucus Urine Present (A) None Seen /LPF    RBC Urine 0 <=2 /HPF    WBC Urine <1 <=5 /HPF    Hyaline Casts Urine 1 <=2 /LPF   Basic metabolic panel   Result Value Ref Range    Sodium 140 136 - 145 mmol/L    Potassium 3.8 3.5 - 5.0 mmol/L    Chloride 109 (H) 98 - 107 mmol/L    Carbon Dioxide (CO2) 23 22 - 31 mmol/L    Anion Gap 8 5 - 18 mmol/L    Urea Nitrogen 36 (H) 8 - 28 mg/dL    Creatinine 2.07 (H) 0.70 - 1.30 mg/dL    Calcium 9.6 8.5 - 10.5 mg/dL    Glucose 154 (H) 70 - 125 mg/dL    GFR Estimate 32 (L) >60 mL/min/1.73m2   Result Value Ref Range    Lipase 17 0 - 52 U/L   Hepatic function panel   Result Value Ref Range    Bilirubin Total 1.4 (H) 0.0 - 1.0 mg/dL    Bilirubin Direct 0.4 <=0.5 mg/dL    Protein Total 6.1 6.0 - 8.0 g/dL    Albumin 3.5 3.5 - 5.0 g/dL    Alkaline Phosphatase 93 45 - 120 U/L    AST 7 0 - 40 U/L    ALT 12 0 - 45 U/L   Result Value Ref Range    Magnesium 2.2 1.8 - 2.6 mg/dL   CRP inflammation   Result Value Ref Range    CRP 1.0 (H) 0.0 - <0.8 mg/dL   Result Value Ref Range    Procalcitonin 0.06 0.00 - 0.49 ng/mL   TSH with free T4 reflex   Result Value Ref Range    TSH 2.17 0.30 - 5.00 uIU/mL   Symptomatic Influenza A/B, RSV, & SARS-CoV2 PCR  (COVID-19) Nose    Specimen: Nose; Swab   Result Value Ref Range    Influenza A PCR Negative Negative    Influenza B PCR Negative Negative    RSV PCR Negative Negative    SARS CoV2 PCR Negative Negative   CBC with platelets and differential   Result Value Ref Range    WBC Count 3.2 (L) 4.0 - 11.0 10e3/uL    RBC Count 3.73 (L) 4.40 - 5.90 10e6/uL    Hemoglobin 8.8 (L) 13.3 - 17.7 g/dL    Hematocrit 29.9 (L) 40.0 - 53.0 %    MCV 80 78 - 100 fL    MCH 23.6 (L) 26.5 - 33.0 pg    MCHC 29.4 (L) 31.5 - 36.5 g/dL    RDW 28.3 (H) 10.0 - 15.0 %    Platelet Count 61 (L) 150 - 450 10e3/uL    % Neutrophils 54 %    % Lymphocytes 31 %    % Monocytes 12 %    % Eosinophils 1 %    % Basophils 1 %    % Immature Granulocytes 1 %    NRBCs per 100 WBC 1 (H) <1 /100    Absolute Neutrophils 1.7 1.6 - 8.3 10e3/uL    Absolute Lymphocytes 1.0 0.8 - 5.3 10e3/uL    Absolute Monocytes 0.4 0.0 - 1.3 10e3/uL    Absolute Eosinophils 0.0 0.0 - 0.7 10e3/uL    Absolute Basophils 0.0 0.0 - 0.2 10e3/uL    Absolute Immature Granulocytes 0.0 <=0.4 10e3/uL    Absolute NRBCs 0.0 10e3/uL   Result Value Ref Range    Troponin I 0.06 0.00 - 0.29 ng/mL   B-Type Natriuretic Peptide ([facility] Only)   Result Value Ref Range    BNP 3,772 (H) 0 - 86 pg/mL   ECG 12-LEAD WITH MUSE ([facility])   Result Value Ref Range    Systolic Blood Pressure 104 mmHg    Diastolic Blood Pressure 55 mmHg    Ventricular Rate 78 BPM    Atrial Rate 78 BPM    FL Interval 158 ms    QRS Duration 134 ms     ms    QTc 531 ms    P Axis 43 degrees    R AXIS 112 degrees    T Axis 260 degrees    Interpretation ECG       Atrial-sensed ventricular-paced rhythm  Abnormal ECG  When compared with ECG of 09-JUN-2023 16:03,  Previous ECG has undetermined rhythm, needs review  Confirmed by SEE ED PROVIDER NOTE FOR, ECG INTERPRETATION ([id number]),  Dorothy Brunner ([id number]) on 6/9/2023 4:06:13 PM     Adult Type and Screen   Result Value Ref Range    ABO/RH(D) A POS     Antibody Screen Negative Negative     SPECIMEN EXPIRATION DATE [id number]        RADIOLOGY:  Reviewed and independently interpreted by me. Please see official radiology report.  Recent Results (from the past 24 hour(s))   XR Chest Port 1 View    Narrative    EXAM: XR CHEST PORT 1 VIEW  LOCATION: [facility]  DATE/TIME: 6/9/2023 4:15 PM CDT    INDICATION: SOB, weak  COMPARISON: 11/22/2022 11/23/2022       Impression    IMPRESSION: No pleural fluid or pneumothorax. A right lower lung opacity could be airspace disease. Recommend follow-up imaging to resolution, perhaps in 4-6 weeks. The cardiomediastinal silhouette is enlarged. A left chest wall biventricular pacemaker   is noted. There is surgical hardware in the spine.        PROCEDURES:   Procedures   --------------------------------------------------------------------------------   Cardiac telemetry monitoring ordered, reviewed, and independently interpreted by me while patient was in the Emergency Department. Revealed paced rhythm with no acute abnormalities.  --------------------------------------------------------------------------------             --------------- ADDITIONAL MDM ---------------  History:  - Supplemental history from:       -- see above charting, if applicable: patient, family  - External Record(s) reviewed:       -- see above charting, if applicable       -- Inpatient/outpatient record (clinic visit 3/1/23), prior labs (blood 6/8/23), prior imaging (CXR 12/3/22)    Workup:  - Chart documentation above includes differential considered and any EKGs or imaging independently interpreted by provider.  - In additional to work up documented, I considered the following work up:       -- see above charting, if applicable    External Consultation:  - Discussion of management with another provider:       -- see above charting, if applicable    Complicating Factors:  - Care impacted by chronic illness:       -- see above MDM, past medical history, & problem list  -  Care affected by social determinants of health:       -- see above social history    Disposition Considerations:  - Discharge       -- I considered admission given that the patient came to the Emergency Department, but ultimately discharged the patient per decision making above       -- I recommended the patient continue their current prescription strength medication(s) as charted above in current medications list       -- I prescribed prescription strength medication(s) as charted above       -- I recommended over-the-counter medication(s) as charted above & in discharge instructions         I, Fabian Gates, am serving as a scribe to document services personally performed by Dr. Claude Tyler based on my observation and the provider's statements to me. I, Claude Tyler MD attest that Fabian Gates is acting in a scribe capacity, has observed my performance of the services and has documented them in accordance with my direction.      Claude Tyler MD  06/09/23  Emergency Medicine  [facility] EMERGENCY ROOM  [facility] [facility] [address]  [phone]  Dept: [phone]      Claude Tyler MD  06/09/23 1951

## 2023-06-09 NOTE — ED TRIAGE NOTES
Pt here with noted fatigue and increased pallor per his care facility. Brought in by wife. Denies pain. Pt oriented to self and location but time is off, but pt wife said he has been more confused over last couple months. Pt has CLL and diabetes. Glucose in triage was 181-pt just ate lunch.

## 2023-06-09 NOTE — DISCHARGE INSTRUCTIONS
Increase your Lasix dose to 60mg per day (40mg in morning, 20mg in evening).    Continue all of your other medications as prescribed.    Follow up with your Primary Care provider in 2 days for a recheck.    Return to the Emergency Department for any difficulty breathing, persistent vomiting, new or worsening symptoms, or any other concerns.

## 2023-06-14 NOTE — NURSING NOTE
Diet: Patient instructed regarding a heart healthy diet, including discussion of reduced fat and sodium intake. Patient demonstrated understanding of this information and agreed to call with further questions or concerns.  Labs: Patient was given results of the laboratory testing obtained today. Patient was instructed to return for the next laboratory testing in 1 week . Patient demonstrated understanding of this information and agreed to call with further questions or concerns.   Return Appointment: Patient given instructions regarding scheduling next clinic visit. Patient demonstrated understanding of this information and agreed to call with further questions or concerns.  Dr Valdes in July - will add on echo  CORE in August   Medication Change: Patient was educated regarding prescribed medication change, including discussion of the indication, administration, side effects, and when to report to MD or RN. Patient demonstrated understanding of this information and agreed to call with further questions or concerns.  Increase Lasix to 40 mg BID for 1 week, then go back to 40;20.     Updated orders faxed to [facility] and called nursing office, left message with order changes.     Patient stated he understood all health information given and agreed to call with further questions or concerns.   Verónica Jarquin RN

## 2023-06-14 NOTE — PATIENT INSTRUCTIONS
Take your medicines every day, as directed    Changes made today:  For one week, increase lasix to 40 mg in the morning and 40 in the afternoon (~1pm)    After one week, decrease lasix back to 40 mg in the morning and 20 mg in the afternoon (~1 pm)   Monitor Your Weight and Symptoms    Contact us if you:    Gain 2 pounds in one day or 5 pounds in one week  Feel more short of breath  Notice more leg swelling  Feel lightheadeded   Change your lifestyle    Limit Salt or Sodium:  2000 mg  Limit Fluids:  2000 mL or approximately 64 ounces  Eat a Heart Healthy Diet  Low in saturated fats  Stay Active:  Aim to move at least 150 minutes every  week         To Contact us    During Business Hours:  [phone], option # 1      After hours, weekends or holidays:   [phone], Option #4  Ask to speak to the On-Call Cardiologist. Inform them you are a CORE/heart failure patient at the [place].     Use [employer] allows you to communicate directly with your heart team through secure messaging.  [employer] can be accessed any time on your phone, computer, or tablet.  If you need assistance, we'd be happy to help!         Keep your Heart Appointments:    - RN phone call in 1 week to check in on weights an symptoms    - BMP in one week    - Dr. Valdes in one month as scheduled, please add an ECHO to this appointment    - CORE clinic in 2 months       Please consider attending our virtual support group which is held monthly. Please reach out to [facility] at [phone] for more information if you are interested in attending.       2023 dates:    Monday, July 3rd, 1-2pm - Managing Heart Failure During Summer (Summer Tips) with Dory Huitron NP  Monday, August 7th, 1-2pm  Monday, September 11th, 1-2pm  Monday, October 2nd, 1-2pm  Monday, November 6th, 1-2pm  Monday, December 4th, 1-2pm

## 2023-06-14 NOTE — PROGRESS NOTES
In person appointment    HPI:   Mr. Manning is a 82 year old male with a past medical history including CLL (ongoing treatment), CAD s/p CABG in 1995 and BHAVNA 12/2022 to the SVG at the first diagonal at [facility],, HFrEF 2/2 ICM with EF previously improved to 35% and now down to 14% as of 12/2022, atrial flutter, PVD s/p iliac artery stents and left lower extremity bypass, hypertension, diabetes, hyperlipidemia, prosthesis on leg for about 2-3 years now. discitis and multple admission for decompensated heart failure. Presents to clinic for CORE f/u.    This winter in December amy had a s significant HF admission at [facility] and then had to go to TCU. During that admission he had an NSTEMI And he had BHAVNA stenting of the SVG graft to the 1st diagonal. Additional relevant findings was patent graft of LIMA to distal LAD, patent graft from aorta to 1st diagonal, and patent graft from aorta to the second marginal. He had an RA of 1, PCW of 6, and Marisabel CI of 2.7 at that time. He was seen in the ER for fatigue. Lasix was increased.     He has been having significant SOB since Memorial day. Since the lasix increase just a few days ago, the cough is improved, but still feeling quite SOB.  He does have LE edema. Low appetite, but not sure about abdominal edema. No orthopnea. No PND. He does get some lightheadedness in the morning when he first gets  Up. No syncope.  No chest pain. No palpitations.    He has an oncology visit at the end of the month- there has been concerns in the blood work which promted that. There have been conversations about hospice, but he is declining at this time. He did ask some excellent questions about that today.    Weights [id number].     Cardiac Medications  Lasix 40/20  Metoprolol succinate 12.5 or 25 mg daily  Entresto 24-26 mg BID  Jardiance 10 mg daily  Atorvastatin 20 mg daily  Eliquis - dosing to be updated  Plavix 75 mg daily  Nitroglycerin PRN      PAST MEDICAL HISTORY:  Past Medical  History:   Diagnosis Date     ACS (acute coronary syndrome) (H) 6/20/2021     Acute respiratory failure with hypoxia (H) 4/6/2017     STORM (acute kidney injury) (H) 9/22/2018     Arthritis      ASCVD (arteriosclerotic cardiovascular disease)      Atrial fibrillation (H) 2/5/2018     Atrial flutter (H) 2/22/2017     BMI 30.0-30.9,adult      BPH (benign prostatic hypertrophy)      CAD S/P percutaneous coronary angioplasty 1/11/2014     Cellulitis      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)      Chronic systolic heart failure (H) 6/6/2017     Confusion 6/20/2021     Coronary artery disease     triple bypass 1995     CRF (chronic renal failure), stage 3 (moderate) (H) 1/20/2017     Critical lower limb ischemia (H) 1/10/2014     Diabetes mellitus (H)      Diabetes mellitus, type 2 (H) 1/11/2014     Diabetic polyneuropathy (H)      Discitis 2/8/2017     Epidural abscess 2/9/2017     Fall 2/7/2017     History of blood transfusion      HTN (hypertension) 1/11/2014     Hyperglycemia 1/9/2020     Hyperlipidaemia      Hyperlipidemia with target LDL less than 100 1/11/2014    Formatting of this note might be different from the original. Overview:  Diagnosis updated by automated process. Provider to review and confirm. Formatting of this note might be different from the original. Diagnosis updated by automated process. Provider to review and confirm.     Hypertension      Long term current use of anticoagulant therapy 2/5/2018     Non-healing ulcer of foot (H)     Right     Noninflammatory pericardial effusion      Osteomyelitis (H) 1/11/2014     Osteomyelitis of foot, right, acute (H) 2/15/2017     Osteomyelitis of left foot (H)      PVD (peripheral vascular disease) (H)      Recurrent falls 6/20/2021     Sebaceous cyst        FAMILY HISTORY:  Family History   Problem Relation Age of Onset     Cancer Mother         leukemia     Leukemia Mother        SOCIAL HISTORY:  Socioeconomic History      Marital status: Single   Tobacco Use     Smoking status: Former Smoker     Packs/day: 2.00     Years: 30.00     Pack years: 60.00     Types: Cigarettes     Last attempt to quit: 1995     Years since quittin.5     Smokeless tobacco: Never Used   Other Topics Concern     Parent/sibling w/ CABG, MI or angioplasty before 65F 55M? Not Asked   Social History Narrative    Lives independently with SO. 2017        CURRENT MEDICATIONS:  alcohol swab prep pads, Use to swab area of injection/jina as directed.  apixaban ANTICOAGULANT (ELIQUIS ANTICOAGULANT) 5 MG tablet, Take 1 tablet (5 mg) by mouth 2 times daily  [place]sa Protect (EUCERIN) external cream, Apply topically 2 times daily as needed for dry skin or other Apply as needed to intact stage 1 pressure area and/or irritated skin, rash or excoriation. Apply to legs.  bisacodyl (DULCOLAX) 10 MG suppository, Place 10 mg rectally daily as needed for constipation If no BM or small BM for 3 days/9 shifts  blood glucose (NO BRAND SPECIFIED) test strip, Use to test blood sugar 4 times daily or as directed.  blood glucose calibration (NO BRAND SPECIFIED) solution, 1 drop every 14 days Use 1 drop to calibrate blood glucose monitor every 14 days  blood glucose monitoring (NO BRAND SPECIFIED) meter device kit, Use to test blood sugar 4 times daily or as directed.  cholecalciferol 1000 UNITS TABS, Take 1,000 Units by mouth daily  citalopram (CELEXA) 20 MG tablet, Take 20 mg by mouth every morning   clonazePAM (KLONOPIN) 0.5 MG tablet,   clopidogrel (PLAVIX) 75 MG tablet, Take 75 mg by mouth daily  empagliflozin (JARDIANCE) 10 MG TABS tablet, Take 1 tablet (10 mg) by mouth daily  insulin glargine (LANTUS PEN) 100 UNIT/ML pen, Inject 50 Units Subcutaneous At Bedtime  insulin lispro (HUMALOG KWIKPEN) 100 UNIT/ML (1 unit dial) KWIKPEN, Inject 16 Units Subcutaneous 2 times daily (before meals) Breakfast and lunch  insulin lispro (HUMALOG KWIKPEN) 100 UNIT/ML (1 unit dial)  KWIKPEN, Inject 18 Units Subcutaneous daily (with dinner)  loperamide (IMODIUM A-D) 2 MG tablet, Take 2-4 mg by mouth 4 times daily as needed for diarrhea Take 4 mg after 1st loose stool, then take 1 tablet as needed after each loose stool  magnesium hydroxide (MILK OF MAGNESIA) 400 MG/5ML suspension, Take 30 mLs by mouth daily as needed for constipation or heartburn If no BM/small BM for 3 days/9 shifts  metFORMIN (GLUCOPHAGE-XR) 750 MG 24 hr tablet, Take 750 mg by mouth every morning  metoprolol succinate ER (TOPROL XL) 25 MG 24 hr tablet, Take 1 tablet (25 mg) by mouth daily  nitroglycerin (NITROSTAT) 0.4 MG sublingual tablet, For chest pain place 1 tablet under the tongue every 5 minutes for 3 doses. If symptoms persist 5 minutes after 1st dose call 911.  nystatin (MYCOSTATIN) 076750 UNIT/GM external powder, Apply topically 2 times daily as needed (to affected area(s) as needed)  pantoprazole (PROTONIX) 40 MG EC tablet, Take 40 mg by mouth daily  polyethylene glycol (MIRALAX) 17 GM/Dose powder, Take 17 g by mouth daily as needed  sacubitril-valsartan (ENTRESTO)  MG per tablet, Take 1 tablet by mouth 2 times daily  spironolactone (ALDACTONE) 25 MG tablet, Take 0.5 tablets (12.5 mg) by mouth daily  thin (NO BRAND SPECIFIED) lancets, 1 each 4 times daily Use with lanceting device.  traZODone (DESYREL) 50 MG tablet, Take 50 mg by mouth At Bedtime  triamcinolone (KENALOG) 0.1 % external cream, 2 times daily as needed Apply topically to right stump, left hand and right forearm twice daily as needed for rash  ULTICARE SHORT 31G X 8 MM insulin pen needle, Inject 1 each Subcutaneous 4 times daily  acetaminophen (TYLENOL) 325 MG tablet, Take 650 mg by mouth every 4 hours as needed Max 3gm per day for minor pain/fever (Patient not taking: Reported on 3/1/2023)  order for DME, Equipment to be ordered:  Scale- Qty 1  Commander Flex - Qty. 1  Pulse-Oximeter - Qty. 1  Use as directed (Patient not taking: Reported on  3/1/2023)  order for DME, Equipment being ordered: DH2 shoe (Patient not taking: Reported on 3/1/2023)    No current facility-administered medications on file prior to visit.      ROS:   See HPI    EXAM:   /68 (BP Location: Right arm, Patient Position: Chair, Cuff Size: Adult Large)   Pulse 85   Wt 89 kg (196 lb 3.2 oz)   SpO2 100%   BMI 26.61 kg/m       GENERAL: Appears comfortable, in no acute distress. More frail than previously.  HEENT: Eye symmetrical, no discharge or icterus bilaterally. Mucous membranes moist and without lesions.  CV: RRR, +S1S2, no murmur, rub, or gallop. JVP ~8  RESPIRATORY: Respirations regular, even, and unlabored. Lungs CTA throughout.   GI: Soft and non distended   EXTREMITIES: AKA of the right leg. Left leg with 1+ edema. Left leg is warm and well perfused.  NEUROLOGIC: Alert and interacting appropriately. No focal deficits.   MUSCULOSKELETAL: No joint swelling or tenderness.   SKIN: No jaundice. No rashes or lesions.     Labs, reviewed with patient in clinic today:  CBC RESULTS:  Lab Results   Component Value Date    WBC 3.2 (L) 06/09/2023    WBC 3.7 (L) 06/26/2021    RBC 3.73 (L) 06/09/2023    RBC 3.09 (L) 06/26/2021    HGB 8.8 (L) 06/09/2023    HGB 9.6 (L) 06/26/2021    HCT 29.9 (L) 06/09/2023    HCT 28.6 (L) 06/26/2021    MCV 80 06/09/2023    MCV 93 06/26/2021    MCH 23.6 (L) 06/09/2023    MCH 31.1 06/26/2021    MCHC 29.4 (L) 06/09/2023    MCHC 33.6 06/26/2021    RDW 28.3 (H) 06/09/2023    RDW 14.6 06/26/2021    PLT 61 (L) 06/09/2023     (L) 06/26/2021       CMP RESULTS:  Lab Results   Component Value Date     06/14/2023     06/28/2021    POTASSIUM 3.8 06/14/2023    POTASSIUM 3.8 06/09/2023    POTASSIUM 4.1 06/28/2021    CHLORIDE 102 06/14/2023    CHLORIDE 109 (H) 06/09/2023    CHLORIDE 101 12/30/2021    CHLORIDE 109 06/28/2021    CO2 22 06/14/2023    CO2 23 06/09/2023    CO2 24 06/28/2021    ANIONGAP 12 06/14/2023    ANIONGAP 8 06/09/2023    ANIONGAP 4  06/28/2021     (H) 06/14/2023     (H) 06/09/2023     (H) 06/09/2023     (H) 06/28/2021    BUN 43.5 (H) 06/14/2023    BUN 36 (H) 06/09/2023    BUN 43 (H) 06/28/2021    CR 1.99 (H) 06/14/2023    CR 1.33 (H) 06/28/2021    GFRESTIMATED 33 (L) 06/14/2023    GFRESTIMATED >60 07/08/2021    GFRESTIMATED 51 (L) 06/28/2021    GFRESTBLACK >60 07/08/2021    GFRESTBLACK 59 (L) 06/28/2021    JUSTINO 9.6 06/14/2023    JUSTINO 9.4 06/28/2021    BILITOTAL 1.4 (H) 06/09/2023    BILITOTAL 0.9 06/20/2021    ALBUMIN 3.5 06/09/2023    ALBUMIN 3.3 (L) 06/20/2021    ALKPHOS 93 06/09/2023    ALKPHOS 72 06/20/2021    ALT 12 06/09/2023    ALT 24 06/20/2021    AST 7 06/09/2023    AST 34 06/20/2021        INR RESULTS:  Lab Results   Component Value Date    INR 1.05 04/16/2021       Lab Results   Component Value Date    MAG 2.2 06/09/2023    MAG 2.0 06/23/2021     Lab Results   Component Value Date    NTBNPI 7,364 (H) 06/20/2021     Lab Results   Component Value Date    NTBNP 30,292 (H) 06/01/2023    NTBNP 22,172 (H) 02/09/2018       Diagnostics:     11/27/23 Coronary angiogram  11/27 coronary angiogram  PRESENTATION / INDICATIONS  * NonSTEMI  VASCULAR ACCESS  * Using ultrasound guidance and a percutaneous technique, the right common femoral artery was accessed. Ultrasound was used to confirm vessel patency, localizing needle into the lumen of the vessel. An image was saved for the medical record.  * Using ultrasound guidance and a percutaneous technique, the right femoral vein was accessed. Ultrasound was used to confirm vessel patency, localizing needle into the lumen of the vessel. An image was saved for the medical record.  DIAGNOSTIC - CORONARY  * Multivessel coronary disease  INTERVENTION (see procedure comments)  * Successful stenting (drug eluting) of the saphenous vein graft to the 1st diagonal - 3.0x22 mm BHAVNA   DIAGNOSTIC SUMMARY    90% stenosis in the Proximal LAD    100% stenosis in the Mid LAD    50% stenosis in  the Distal LAD    100% stenosis in the Proximal Circumflex    40% stenosis in the Proximal RCA    60% stenosis in the Mid RCA    60% stenosis in the Mid RCA    50% stenosis in the Distal RCA    50% stenosis in the Distal RCA    The graft from the LIMA to the Distal LAD is patent and is free of significant disease.    The graft from the Aorta to the 1st Diagonal is patent.    90% stenosis in the Aorta graft to 1st Diagonal    The graft from the Aorta to the 2nd Marginal is patent and is free of significant disease.  LEFT VENTRICULAR FUNCTION    LV Pressure = 130/7.  HEMODYNAMICS    RA=1; RV=33/3; PW=6; PA=32/9, mean 18; Marisabel CO 5.3, CI 2.7.      TTE 11/23/22    1. Increased left ventricular chamber size. Severely decreased left ventricular systolic function. Calculated left ventricular ejection  fraction (modified Young technique) is 14 %. Severe global hypokinesis. Normal left ventricular wall thickness. No LV apical  thrombus visualized  2. Moderately increased right ventricular chamber size. Moderately decreased right ventricular systolic function.  3. Mild left atrial enlargement.  4. Mild central aortic valve regurgitation.  5. Normal indexed ascending aorta dimension (3.3 cm, 1.6 cm/m ).  6. IVC dilated on BiPAP.  7. No comparison study.     5/31/23 ICD check     Device: Medtronic [id number] Percepta Quad CRT-P  Normal Device Function  Mode: DDD  bpm  AP: 5.6%  : 47.9%  BVP: 94.8%  Presenting EGM: AS- 83 bpm  Thoracic Impedance: Near reference line.   Short V-V intervals: 0  Lead Trends Appear Stable.  Estimated battery longevity to RRT = 11.5 years.  Battery voltage = 3.10 V.   Atrial Arrhythmia: None  AF [place]: 0%  Anticoagulant: Eliquis  Ventricular Arrhythmia: None  Pt Notified of Transmission Results: My Chart     Plan: Device follow-up every 3 months.  Deja Trimble, RN    8/31/22 ECHO  The Left ventricular function is moderately reduced. The visually estimated  ejection fraction is 30-35%  Global  right ventricular function is borderline reduced.  Moderate left atrial enlargement is present.  No pericardial effusion is present.     This study was compared with the study from 06/20/2021. Ejection fraction has  improved slightly.  Pulmonary artery systolic pressure cannot be assessed.  The left ventricular size has improved.    6/22/21 Coronary angiogram    Dist LAD lesion is 40% stenosed.    Mid LM to Prox LAD lesion is 90% stenosed.    Mid LAD lesion is 100% stenosed.    Ost Cx to Dist Cx lesion is 100% stenosed.    Prox Graft lesion is 70% stenosed.    1st Diag lesion is 80% stenosed.    Lat 1st Diag lesion is 90% stenosed.     1.  Severe multivessel CAD s/p CABG.                LM has severe stenosis.                LAD has sever proximal stenosis and midLAD is occluded. Distal LAD fills via patent LIMA.                LCx is occluded.                 RCA has moderate diffuse disease.  2.  LIMA to LAD is patent.  Mid to distal LAD stents are patent.  3.  SVG to OM is patent.  4.  SVG to Diagonal is patent with a 70% stenosis and supplies diagonal branches are small and diffusely diseased.    Diseased diagonal graft supplies very severely diffusely diseased diagonal branches.  PCI of the vein graft is unlikely to provide much benefit and there is significant risk of acute occlusion due to poor runoff with severe disease on the native diagonal branch vessels which are too small for PCI.    Recommend medical management.  This was discussed with referring cardiologist.      TTE 6/20/21  1. Left ventricular systolic function is severely reduced. The visual ejection  fraction is estimated at 20-25%.  2. The right ventricle is normal in structure, function and size.  3. The left atrium is moderately dilated.  4. The aortic valve is trileaflet with aortic valve sclerosis.  5. There is mild (1+) mitral regurgitation.  ______________________________________________________________________________      TTE  10/5/18  The visual ejection fraction is estimated at 35-40%.  There is mild-moderate global hypokinesia of the left ventricle.  Mildly decreased right ventricular systolic function  In comparison with the previous study the LVEF has improved slightly.    Assessment and Plan:   Mr. Manning is a 82 year old male with a past medical history including CLL (ongoing treatment), CAD s/p CABG in 1995 and BHAVNA 12/2022 to the SVG at the [facility],, HFrEF 2/2 ICM with EF previously improved to 35% and now down to 14% as of 12/2022, atrial flutter, PVD s/p iliac artery stents and left lower extremity bypass, hypertension, diabetes, hyperlipidemia, prosthesis on leg for about 2-3 years now. discitis and multple admission for decompensated heart failure. Presents to clinic for CORE f/u.    I hadn't seen him since his admission at [facility] this December- since that time, he has certainly gotten frailer. His EF is down to 14% and he has gotten another stent. His local providers are concerned about his trajectory and have had some discussions about hospice. He is currently declining, but is asking excellent questions about this today. I am concerned about how little of his HF medications he is now tolerating (significant decreases were made in Dec). HE is also hypervolemic today, although still on fairly reasonable diuretic doses. His sodium and chloride also remain normal. His Cr is stable with his longstanding baseline. We will increase his diuretics today (Recently increased and he has had partial improvement), and then we will check back in this week. I do expect we will continue to have goals of care discussions over the next few visits as we continue to see his trajectory. I would like to get an ECHO given we are 90 days post revascularization to help aid our prognostic conversations.         # Chronic systolic heart failure/HFrEF secondary to ICM  (Ef 20-25% now improved to 30-35%)  Stage C. NYHA  Class IIIA  Fluid status: hypervolemic- increase lasix to 40 mg BID for one week and then decrease to 40 vmg in the AM and 20 mg in the PM (which will still represent an increase from his previous stable dose)  ACEi/ARB/ARNI: Entresto 24-26 mg BID- decreased from  during his Dec 2022 admission  BB: metoprolol XL 25 mg daily or 12.5 mg daily- will confirm dose  Aldosterone antagonist: Aldactone 12.5 mg (previously was on 25 mg dailiy, but decreased during Dec 2022 admission)  SGLT2i- Jardiance 10 mg daily  SCD prophylaxis: Will recheck ECHO given 90 days s/p revascularization, but would expect implant would be deferred in the setting of prognostics- if EF is still low, will discuss with primary cardiologist  NSAID use: contraindicated  Sleep apnea evaluation: not discussed today  Remote monitoring: N/A    # CAD s/p CABG now s/p BHAVNA to SVG to the 1st diagonal 12/2022  # PAD s/p iliac stenting and LE bypass  - No anginal symptoms, he is on eliquis and statin (defer high potency to cardiologist); he has stable claudication  - On Plavix/Eliquis  - On BB as above  - On atrovastatin  - Recheck ECHO now that 90 days s/p revascularization     # Atrial flutter, clinically in sinus today  - He is on BB and elquis    # CKD stage 3b  - Cr stable at his baseline at 1.99 toady    # T2DM  - Managed by Dr. Stahl    # CLL, on oral therapy, done with infusions at this point.    Follow-up  - RN phone call in 1 week to check on weights and symptoms  - BMP in 1 week (incrased BMP)  - Dr. Leon in one month with ECHO  - CORE in 2 months or sooner PRN    Billing  - I managed 2+ stable chronic conditions  - I changed a prescription medication  - I spent 25 minutes face to face with the patient on day of service in direct counseling and an additional 20 minutes on the day of service coordinating care and completing documentation       Carly Lomas PA-C  [facility]  [facility]  FABY LEON

## 2023-06-14 NOTE — NURSING NOTE
Chief Complaint   Patient presents with     Follow Up     6/14/2023: Reason for visit - Return CORE, 82 year old male with chronic systolic heart failure presents for follow up with labs prior     Vitals were taken and medications reconciled.    Freddie Hernandes, EMT  10:45 AM

## 2023-06-14 NOTE — LETTER
6/14/2023      RE: Tad Manning  [address]  [facility] [address]       Dear Colleague,    Thank you for the opportunity to participate in the care of your patient, Tad Manning, at the [facility] [place] [facility]. Please see a copy of my visit note below.    In person appointment    HPI:   Mr. Manning is a 82 year old male with a past medical history including CLL (ongoing treatment), CAD s/p CABG in 1995 and BHAVNA 12/2022 to the SVG at the first diagonal at [facility],, HFrEF 2/2 ICM with EF previously improved to 35% and now down to 14% as of 12/2022, atrial flutter, PVD s/p iliac artery stents and left lower extremity bypass, hypertension, diabetes, hyperlipidemia, prosthesis on leg for about 2-3 years now. discitis and multple admission for decompensated heart failure. Presents to clinic for CORE f/u.    This winter in December amy had a s significant HF admission at [facility] and then had to go to TCU. During that admission he had an NSTEMI And he had BHAVNA stenting of the SVG graft to the 1st diagonal. Additional relevant findings was patent graft of LIMA to distal LAD, patent graft from aorta to 1st diagonal, and patent graft from aorta to the second marginal. He had an RA of 1, PCW of 6, and Marisabel CI of 2.7 at that time. He was seen in the ER for fatigue. Lasix was increased.     He has been having significant SOB since Memorial day. Since the lasix increase just a few days ago, the cough is improved, but still feeling quite SOB.  He does have LE edema. Low appetite, but not sure about abdominal edema. No orthopnea. No PND. He does get some lightheadedness in the morning when he first gets  Up. No syncope.  No chest pain. No palpitations.    He has an oncology visit at the end of the month- there has been concerns in the blood work which promted that. There have been conversations about hospice, but he is  declining at this time. He did ask some excellent questions about that today.    Weights 199-200.     Cardiac Medications  Lasix 40/20  Metoprolol succinate 12.5 or 25 mg daily  Entresto 24-26 mg BID  Jardiance 10 mg daily  Atorvastatin 20 mg daily  Eliquis - dosing to be updated  Plavix 75 mg daily  Nitroglycerin PRN      PAST MEDICAL HISTORY:  Past Medical History:   Diagnosis Date    ACS (acute coronary syndrome) (H) 6/20/2021    Acute respiratory failure with hypoxia (H) 4/6/2017    STORM (acute kidney injury) (H) 9/22/2018    Arthritis     ASCVD (arteriosclerotic cardiovascular disease)     Atrial fibrillation (H) 2/5/2018    Atrial flutter (H) 2/22/2017    BMI 30.0-30.9,adult     BPH (benign prostatic hypertrophy)     CAD S/P percutaneous coronary angioplasty 1/11/2014    Cellulitis     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)     Chronic systolic heart failure (H) 6/6/2017    Confusion 6/20/2021    Coronary artery disease     triple bypass 1995    CRF (chronic renal failure), stage 3 (moderate) (H) 1/20/2017    Critical lower limb ischemia (H) 1/10/2014    Diabetes mellitus (H)     Diabetes mellitus, type 2 (H) 1/11/2014    Diabetic polyneuropathy (H)     Discitis 2/8/2017    Epidural abscess 2/9/2017    Fall 2/7/2017    History of blood transfusion     HTN (hypertension) 1/11/2014    Hyperglycemia 1/9/2020    Hyperlipidaemia     Hyperlipidemia with target LDL less than 100 1/11/2014    Formatting of this note might be different from the original. Overview:  Diagnosis updated by automated process. Provider to review and confirm. Formatting of this note might be different from the original. Diagnosis updated by automated process. Provider to review and confirm.    Hypertension     Long term current use of anticoagulant therapy 2/5/2018    Non-healing ulcer of foot (H)     Right    Noninflammatory pericardial effusion     Osteomyelitis (H) 1/11/2014    Osteomyelitis of foot,  right, acute (H) 2/15/2017    Osteomyelitis of left foot (H)     PVD (peripheral vascular disease) (H)     Recurrent falls 2021    Sebaceous cyst        FAMILY HISTORY:  Family History   Problem Relation Age of Onset    Cancer Mother         leukemia    Leukemia Mother        SOCIAL HISTORY:  Socioeconomic History    Marital status: Single   Tobacco Use    Smoking status: Former Smoker     Packs/day: 2.00     Years: 30.00     Pack years: 60.00     Types: Cigarettes     Last attempt to quit: 1995     Years since quittin.5    Smokeless tobacco: Never Used   Other Topics Concern    Parent/sibling w/ CABG, MI or angioplasty before 65F 55M? Not Asked   Social History Narrative    Lives independently with SO. 2017        CURRENT MEDICATIONS:  alcohol swab prep pads, Use to swab area of injection/jina as directed.  apixaban ANTICOAGULANT (ELIQUIS ANTICOAGULANT) 5 MG tablet, Take 1 tablet (5 mg) by mouth 2 times daily  Mahsa Protect (EUCERIN) external cream, Apply topically 2 times daily as needed for dry skin or other Apply as needed to intact stage 1 pressure area and/or irritated skin, rash or excoriation. Apply to legs.  bisacodyl (DULCOLAX) 10 MG suppository, Place 10 mg rectally daily as needed for constipation If no BM or small BM for 3 days/9 shifts  blood glucose (NO BRAND SPECIFIED) test strip, Use to test blood sugar 4 times daily or as directed.  blood glucose calibration (NO BRAND SPECIFIED) solution, 1 drop every 14 days Use 1 drop to calibrate blood glucose monitor every 14 days  blood glucose monitoring (NO BRAND SPECIFIED) meter device kit, Use to test blood sugar 4 times daily or as directed.  cholecalciferol 1000 UNITS TABS, Take 1,000 Units by mouth daily  citalopram (CELEXA) 20 MG tablet, Take 20 mg by mouth every morning   clonazePAM (KLONOPIN) 0.5 MG tablet,   clopidogrel (PLAVIX) 75 MG tablet, Take 75 mg by mouth daily  empagliflozin (JARDIANCE) 10 MG TABS tablet, Take 1 tablet (10  mg) by mouth daily  insulin glargine (LANTUS PEN) 100 UNIT/ML pen, Inject 50 Units Subcutaneous At Bedtime  insulin lispro (HUMALOG KWIKPEN) 100 UNIT/ML (1 unit dial) KWIKPEN, Inject 16 Units Subcutaneous 2 times daily (before meals) Breakfast and lunch  insulin lispro (HUMALOG KWIKPEN) 100 UNIT/ML (1 unit dial) KWIKPEN, Inject 18 Units Subcutaneous daily (with dinner)  loperamide (IMODIUM A-D) 2 MG tablet, Take 2-4 mg by mouth 4 times daily as needed for diarrhea Take 4 mg after 1st loose stool, then take 1 tablet as needed after each loose stool  magnesium hydroxide (MILK OF MAGNESIA) 400 MG/5ML suspension, Take 30 mLs by mouth daily as needed for constipation or heartburn If no BM/small BM for 3 days/9 shifts  metFORMIN (GLUCOPHAGE-XR) 750 MG 24 hr tablet, Take 750 mg by mouth every morning  metoprolol succinate ER (TOPROL XL) 25 MG 24 hr tablet, Take 1 tablet (25 mg) by mouth daily  nitroglycerin (NITROSTAT) 0.4 MG sublingual tablet, For chest pain place 1 tablet under the tongue every 5 minutes for 3 doses. If symptoms persist 5 minutes after 1st dose call 911.  nystatin (MYCOSTATIN) 167046 UNIT/GM external powder, Apply topically 2 times daily as needed (to affected area(s) as needed)  pantoprazole (PROTONIX) 40 MG EC tablet, Take 40 mg by mouth daily  polyethylene glycol (MIRALAX) 17 GM/Dose powder, Take 17 g by mouth daily as needed  sacubitril-valsartan (ENTRESTO)  MG per tablet, Take 1 tablet by mouth 2 times daily  spironolactone (ALDACTONE) 25 MG tablet, Take 0.5 tablets (12.5 mg) by mouth daily  thin (NO BRAND SPECIFIED) lancets, 1 each 4 times daily Use with lanceting device.  traZODone (DESYREL) 50 MG tablet, Take 50 mg by mouth At Bedtime  triamcinolone (KENALOG) 0.1 % external cream, 2 times daily as needed Apply topically to right stump, left hand and right forearm twice daily as needed for rash  ULTICARE SHORT 31G X 8 MM insulin pen needle, Inject 1 each Subcutaneous 4 times  daily  acetaminophen (TYLENOL) 325 MG tablet, Take 650 mg by mouth every 4 hours as needed Max 3gm per day for minor pain/fever (Patient not taking: Reported on 3/1/2023)  order for DME, Equipment to be ordered:  Scale- Qty 1  Commander Flex - Qty. 1  Pulse-Oximeter - Qty. 1  Use as directed (Patient not taking: Reported on 3/1/2023)  order for DME, Equipment being ordered: DH2 shoe (Patient not taking: Reported on 3/1/2023)    No current facility-administered medications on file prior to visit.      ROS:   See HPI    EXAM:   /68 (BP Location: Right arm, Patient Position: Chair, Cuff Size: Adult Large)   Pulse 85   Wt 89 kg (196 lb 3.2 oz)   SpO2 100%   BMI 26.61 kg/m       GENERAL: Appears comfortable, in no acute distress. More frail than previously.  HEENT: Eye symmetrical, no discharge or icterus bilaterally. Mucous membranes moist and without lesions.  CV: RRR, +S1S2, no murmur, rub, or gallop. JVP ~8  RESPIRATORY: Respirations regular, even, and unlabored. Lungs CTA throughout.   GI: Soft and non distended   EXTREMITIES: AKA of the right leg. Left leg with 1+ edema. Left leg is warm and well perfused.  NEUROLOGIC: Alert and interacting appropriately. No focal deficits.   MUSCULOSKELETAL: No joint swelling or tenderness.   SKIN: No jaundice. No rashes or lesions.     Labs, reviewed with patient in clinic today:  CBC RESULTS:  Lab Results   Component Value Date    WBC 3.2 (L) 06/09/2023    WBC 3.7 (L) 06/26/2021    RBC 3.73 (L) 06/09/2023    RBC 3.09 (L) 06/26/2021    HGB 8.8 (L) 06/09/2023    HGB 9.6 (L) 06/26/2021    HCT 29.9 (L) 06/09/2023    HCT 28.6 (L) 06/26/2021    MCV 80 06/09/2023    MCV 93 06/26/2021    MCH 23.6 (L) 06/09/2023    MCH 31.1 06/26/2021    MCHC 29.4 (L) 06/09/2023    MCHC 33.6 06/26/2021    RDW 28.3 (H) 06/09/2023    RDW 14.6 06/26/2021    PLT 61 (L) 06/09/2023     (L) 06/26/2021       CMP RESULTS:  Lab Results   Component Value Date     06/14/2023      06/28/2021    POTASSIUM 3.8 06/14/2023    POTASSIUM 3.8 06/09/2023    POTASSIUM 4.1 06/28/2021    CHLORIDE 102 06/14/2023    CHLORIDE 109 (H) 06/09/2023    CHLORIDE 101 12/30/2021    CHLORIDE 109 06/28/2021    CO2 22 06/14/2023    CO2 23 06/09/2023    CO2 24 06/28/2021    ANIONGAP 12 06/14/2023    ANIONGAP 8 06/09/2023    ANIONGAP 4 06/28/2021     (H) 06/14/2023     (H) 06/09/2023     (H) 06/09/2023     (H) 06/28/2021    BUN 43.5 (H) 06/14/2023    BUN 36 (H) 06/09/2023    BUN 43 (H) 06/28/2021    CR 1.99 (H) 06/14/2023    CR 1.33 (H) 06/28/2021    GFRESTIMATED 33 (L) 06/14/2023    GFRESTIMATED >60 07/08/2021    GFRESTIMATED 51 (L) 06/28/2021    GFRESTBLACK >60 07/08/2021    GFRESTBLACK 59 (L) 06/28/2021    JUSTINO 9.6 06/14/2023    JUSTINO 9.4 06/28/2021    BILITOTAL 1.4 (H) 06/09/2023    BILITOTAL 0.9 06/20/2021    ALBUMIN 3.5 06/09/2023    ALBUMIN 3.3 (L) 06/20/2021    ALKPHOS 93 06/09/2023    ALKPHOS 72 06/20/2021    ALT 12 06/09/2023    ALT 24 06/20/2021    AST 7 06/09/2023    AST 34 06/20/2021        INR RESULTS:  Lab Results   Component Value Date    INR 1.05 04/16/2021       Lab Results   Component Value Date    MAG 2.2 06/09/2023    MAG 2.0 06/23/2021     Lab Results   Component Value Date    NTBNPI 7,364 (H) 06/20/2021     Lab Results   Component Value Date    NTBNP 30,292 (H) 06/01/2023    NTBNP 22,172 (H) 02/09/2018       Diagnostics:     11/27/23 Coronary angiogram  11/27 coronary angiogram  PRESENTATION / INDICATIONS  * NonSTEMI  VASCULAR ACCESS  * Using ultrasound guidance and a percutaneous technique, the right common femoral artery was accessed. Ultrasound was used to confirm vessel patency, localizing needle into the lumen of the vessel. An image was saved for the medical record.  * Using ultrasound guidance and a percutaneous technique, the right femoral vein was accessed. Ultrasound was used to confirm vessel patency, localizing needle into the lumen of the vessel. An image was  saved for the medical record.  DIAGNOSTIC - CORONARY  * Multivessel coronary disease  INTERVENTION (see procedure comments)  * Successful stenting (drug eluting) of the saphenous vein graft to the 1st diagonal - 3.0x22 mm BHAVNA   DIAGNOSTIC SUMMARY    90% stenosis in the Proximal LAD    100% stenosis in the Mid LAD    50% stenosis in the Distal LAD    100% stenosis in the Proximal Circumflex    40% stenosis in the Proximal RCA    60% stenosis in the Mid RCA    60% stenosis in the Mid RCA    50% stenosis in the Distal RCA    50% stenosis in the Distal RCA    The graft from the LIMA to the Distal LAD is patent and is free of significant disease.    The graft from the Aorta to the 1st Diagonal is patent.    90% stenosis in the Aorta graft to 1st Diagonal    The graft from the Aorta to the 2nd Marginal is patent and is free of significant disease.  LEFT VENTRICULAR FUNCTION    LV Pressure = 130/7.  HEMODYNAMICS    RA=1; RV=33/3; PW=6; PA=32/9, mean 18; Marisabel CO 5.3, CI 2.7.      TTE 11/23/22    1. Increased left ventricular chamber size. Severely decreased left ventricular systolic function. Calculated left ventricular ejection  fraction (modified Young technique) is 14 %. Severe global hypokinesis. Normal left ventricular wall thickness. No LV apical  thrombus visualized  2. Moderately increased right ventricular chamber size. Moderately decreased right ventricular systolic function.  3. Mild left atrial enlargement.  4. Mild central aortic valve regurgitation.  5. Normal indexed ascending aorta dimension (3.3 cm, 1.6 cm/m ).  6. IVC dilated on BiPAP.  7. No comparison study.     5/31/23 ICD check     Device: Medtronic [id number] Percepta Quad CRT-P  Normal Device Function  Mode: DDD  bpm  AP: 5.6%  : 47.9%  BVP: 94.8%  Presenting EGM: AS- 83 bpm  Thoracic Impedance: Near reference line.   Short V-V intervals: 0  Lead Trends Appear Stable.  Estimated battery longevity to RRT = 11.5 years.  Battery voltage = 3.10  V.   Atrial Arrhythmia: None  AF [place]: 0%  Anticoagulant: Eliquis  Ventricular Arrhythmia: None  Pt Notified of Transmission Results: My Chart     Plan: Device follow-up every 3 months.  Deja Trimble RN    8/31/22 ECHO  The Left ventricular function is moderately reduced. The visually estimated  ejection fraction is 30-35%  Global right ventricular function is borderline reduced.  Moderate left atrial enlargement is present.  No pericardial effusion is present.     This study was compared with the study from 06/20/2021. Ejection fraction has  improved slightly.  Pulmonary artery systolic pressure cannot be assessed.  The left ventricular size has improved.    6/22/21 Coronary angiogram  Dist LAD lesion is 40% stenosed.  Mid LM to Prox LAD lesion is 90% stenosed.  Mid LAD lesion is 100% stenosed.  Ost Cx to Dist Cx lesion is 100% stenosed.  Prox Graft lesion is 70% stenosed.  1st Diag lesion is 80% stenosed.  Lat 1st Diag lesion is 90% stenosed.     1.  Severe multivessel CAD s/p CABG.                LM has severe stenosis.                LAD has sever proximal stenosis and midLAD is occluded. Distal LAD fills via patent LIMA.                LCx is occluded.                 RCA has moderate diffuse disease.  2.  LIMA to LAD is patent.  Mid to distal LAD stents are patent.  3.  SVG to OM is patent.  4.  SVG to Diagonal is patent with a 70% stenosis and supplies diagonal branches are small and diffusely diseased.    Diseased diagonal graft supplies very severely diffusely diseased diagonal branches.  PCI of the vein graft is unlikely to provide much benefit and there is significant risk of acute occlusion due to poor runoff with severe disease on the native diagonal branch vessels which are too small for PCI.    Recommend medical management.  This was discussed with referring cardiologist.      TTE 6/20/21  1. Left ventricular systolic function is severely reduced. The visual ejection  fraction is estimated at  20-25%.  2. The right ventricle is normal in structure, function and size.  3. The left atrium is moderately dilated.  4. The aortic valve is trileaflet with aortic valve sclerosis.  5. There is mild (1+) mitral regurgitation.  ______________________________________________________________________________      TTE 10/5/18  The visual ejection fraction is estimated at 35-40%.  There is mild-moderate global hypokinesia of the left ventricle.  Mildly decreased right ventricular systolic function  In comparison with the previous study the LVEF has improved slightly.    Assessment and Plan:   Mr. Manning is a 82 year old male with a past medical history including CLL (ongoing treatment), CAD s/p CABG in 1995 and BHAVNA 12/2022 to the SVG at the [facility],, HFrEF 2/2 ICM with EF previously improved to 35% and now down to 14% as of 12/2022, atrial flutter, PVD s/p iliac artery stents and left lower extremity bypass, hypertension, diabetes, hyperlipidemia, prosthesis on leg for about 2-3 years now. discitis and multple admission for decompensated heart failure. Presents to clinic for CORE f/u.    I hadn't seen him since his admission at [facility] this December- since that time, he has certainly gotten frailer. His EF is down to 14% and he has gotten another stent. His local providers are concerned about his trajectory and have had some discussions about hospice. He is currently declining, but is asking excellent questions about this today. I am concerned about how little of his HF medications he is now tolerating (significant decreases were made in Dec). HE is also hypervolemic today, although still on fairly reasonable diuretic doses. His sodium and chloride also remain normal. His Cr is stable with his longstanding baseline. We will increase his diuretics today (Recently increased and he has had partial improvement), and then we will check back in this week. I do expect we will continue to have  goals of care discussions over the next few visits as we continue to see his trajectory. I would like to get an ECHO given we are 90 days post revascularization to help aid our prognostic conversations.         # Chronic systolic heart failure/HFrEF secondary to ICM  (Ef 20-25% now improved to 30-35%)  Stage C. NYHA Class IIIA  Fluid status: hypervolemic- increase lasix to 40 mg BID for one week and then decrease to 40 vmg in the AM and 20 mg in the PM (which will still represent an increase from his previous stable dose)  ACEi/ARB/ARNI: Entresto 24-26 mg BID- decreased from  during his Dec 2022 admission  BB: metoprolol XL 25 mg daily or 12.5 mg daily- will confirm dose  Aldosterone antagonist: Aldactone 12.5 mg (previously was on 25 mg dailiy, but decreased during Dec 2022 admission)  SGLT2i- Jardiance 10 mg daily  SCD prophylaxis: Will recheck ECHO given 90 days s/p revascularization, but would expect implant would be deferred in the setting of prognostics- if EF is still low, will discuss with primary cardiologist  NSAID use: contraindicated  Sleep apnea evaluation: not discussed today  Remote monitoring: N/A    # CAD s/p CABG now s/p BHAVNA to SVG to the 1st diagonal 12/2022  # PAD s/p iliac stenting and LE bypass  - No anginal symptoms, he is on eliquis and statin (defer high potency to cardiologist); he has stable claudication  - On Plavix/Eliquis  - On BB as above  - On atrovastatin  - Recheck ECHO now that 90 days s/p revascularization     # Atrial flutter, clinically in sinus today  - He is on BB and elquis    # CKD stage 3b  - Cr stable at his baseline at 1.99 toady    # T2DM  - Managed by Dr. Stahl    # CLL, on oral therapy, done with infusions at this point.    Follow-up  - RN phone call in 1 week to check on weights and symptoms  - BMP in 1 week (incrased BMP)  - Dr. Valdes in one month with ECHO  - CORE in 2 months or sooner PRNICO Rojas  - I managed 2+ stable chronic conditions  - I changed a  prescription medication  - I spent 25 minutes face to face with the patient on day of service in direct counseling and an additional 20 minutes on the day of service coordinating care and completing documentation       Carly Lomas PA-C  [facility]  [facility]  FABY LEON

## 2023-06-15 NOTE — PROGRESS NOTES
Per provider request, called assisted living and requested med list be sent over. After med reconciliation in clinic yesterday, still noted some discrepancies.   Left message with request and fax number. Verónica Jarquin RN

## 2023-06-21 NOTE — PROGRESS NOTES
Called Hima to check in on weights and symptoms. Seen in CORE Clinic last week. Weight was 199-200 and was having a lot of SOB and more fatigue. Also had LE edema.     Still has moments of hard breathing. Thinks its about the same.   Weight was 189.9 yesterday. Asked him if he felt like he was down 10 lbs and he didn't really feel like it.   Swelling in legs - no change.     Called nursing office at his [facility]isted [facility] to confirm weight. Also to ask for faxed copy of medication list as it didn't get sent over last week. Left message.   Should be getting labs in next day or so at [facility].

## 2023-06-22 NOTE — PROGRESS NOTES
Received fax from Assisted Living with Hima's current medications.   Current heart medications that match ours:  Plavix 75 mg daily  Entresto 24/26 mg BID  Jardiance 10 mg daily  Spironolactone 12.5 mg daily  Lasix 40 mg BID for 1 week, decreasing to 40/20 on 6/22/23.     Discrepancies noted:  Eliquis 2.5 mg BID (our records have him taking 5 mg BID)  Metoprolol XL 12.5 mg daily (our records have him taking 25 mg daily)    Didn't get fax with his updated weights this week and called again to request this, voicemail left.

## 2023-06-22 NOTE — PROGRESS NOTES
Update from nursing office at Assisted Living:  Nurse called to let me know they only do weights monthly and Hima used to be good about weighin himself daily. As he's grown weaker, that has gone down to once or twice a week.   His weight this week on the 20th was actually 196.8, not 189.   She expressed concerns from their PA at the facility who thought the lasix dosing was getting high and worried about kidney function. Reviewed with her that kidney function has been elevated for quite some time and was stable with this increase.   She reports there have been multiple conversations ongoing about hospice. Care conference is scheduled sometime in the next few weeks with family and primary at the facility and they were wanting our input. I told her if the provider there wants to talk to our PA to let me know and I'm happy to connect them.     In meantime, will confirm ongoing Lasix dosing with Deb.   Verónica Jarquin RN

## 2023-06-22 NOTE — PROGRESS NOTES
Reviewed with provider. Will update Eliquis and Metoprolol in our system to match what he is currently taking. Verónica Jarquin RN

## 2023-06-23 NOTE — TELEPHONE ENCOUNTER
Called Daphnie BERNAL at [facility] to discuss recommendations:    Date: 6/23/2023    Time of Call: 3:22 PM     Diagnosis:  Heart failure     [ VORB ] Ordering provider: DICK Joshi    Order: increase lasix to 60 mg BID, BMP in 1 week     Order received by: Taylor Arreola RN       Follow-up/additional notes: Discussed with GABE Eller, faxed over to her and provided DICK Joshi's pager for questions about lasix dosing and hospice input

## 2023-06-28 NOTE — PROGRESS NOTES
Reviewed with Deb JENNINGS.   Date: 6/28/2023    Time of Call: 1:46 PM     Diagnosis:  bradycardia     [ VORB ] Ordering provider: Deb JENNINGS  Order: HOLD Metoprolol for now    Order received by: Verónica Jarquin RN      Follow-up/additional notes: left detailed message for nursing staff to hold metoprolol. Would recommend ER if this happens again if it lines up with his goals of care.      Call back from Daphnie requesting order be faxed. Had to discontinue it and was faxed.   She reports Hima is really not doing well, increasing weakness, not making it down for activities. Is seeing his oncology team tomorrow. Encouraged her to continue to have conversations about hospice and make sure family is up to date, and talk with Hima as to whether he should go to ER for any of these things.

## 2023-06-28 NOTE — PROGRESS NOTES
Received voicemail on direct line 6/26 that patient was having low HR into 30s while ambulating with PT and sats to 80. Was off yesterday and just got this voicemail this morning     Called back and spoke with Daphnie in nursing office. Asked that going forward they call the main nurse triage line.     1530 yesterday PT reported he had decreased HR in 30s and sats to 81 with ambulation.     She reports he is having increasing weakness and needing more assistance. No SOB at rest. Today she reports HR 72, 110/76, sats 92 on RA today.   Using wheelchair as much as possible to limit activity right now.   She is unsure of weights, he hasn't weighed himself last few days.     Called and spoke to Hima. He reports he did weigh himself this morning and was 196.0. He thinks the swelling and breathing is about the same. Denies any dizziness or lightheadedness.      Care conference is July 11th for further discussion on hospice. No MD in house this week to evaluate him.     I advised that I will review with provider, but that in the meantime if this happens again would recommend sending him to ER.   Will call back with any additional recommendations.   Verónica Jarquin RN

## 2023-07-03 ENCOUNTER — PATIENT OUTREACH (OUTPATIENT)
Dept: CARDIOLOGY | Facility: CLINIC | Age: 83
End: 2023-07-03
Payer: COMMERCIAL

## 2023-07-03 NOTE — PROGRESS NOTES
Called Hima to check in and review labs from end of last week.   June 23rd increased Lasix to 60 mg BID.   No answer and no voicemail. Will try him back later.   Labs overall stable.   Has cardiology follow up next 7/24.

## 2023-07-05 ENCOUNTER — PATIENT OUTREACH (OUTPATIENT)
Dept: CARDIOLOGY | Facility: CLINIC | Age: 83
End: 2023-07-05
Payer: COMMERCIAL

## 2023-07-05 NOTE — PROGRESS NOTES
Received voicemail from Joyce that Hima passed away on 7/2 and requested I cancel all appointments. Wanted to express her gratitude to Zeinab Boyd and Dr Valdes for all their care.     Will update providers and notify HIM to update chart. Verónica Jarquin RN

## 2023-07-10 ENCOUNTER — ANCILLARY ORDERS (OUTPATIENT)
Dept: CARDIOLOGY | Facility: CLINIC | Age: 83
End: 2023-07-10

## 2023-07-10 DIAGNOSIS — I48.91 ATRIAL FIBRILLATION AND FLUTTER (H): ICD-10-CM

## 2023-07-10 DIAGNOSIS — I48.92 ATRIAL FIBRILLATION AND FLUTTER (H): ICD-10-CM
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
## 2023-12-20 NOTE — NURSING NOTE
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 Labs drawn peripherally and, nothing abnormal aside from non-fasting glucose. Adequate for treatment today. Patient assessed by MD. Clinical assessment  of treatment response performed per protocol requirements. LN assessed and labs reviewed with PI. Table 10.1 reviewed - patient still with CR. Review AEs with provider, none believed to be disease-related.     Patient stopped venetoclax x 7 days due to unacceptable non-hem toxicities. She resumed medication on 12/15. She reports most symptoms resumed within 24 hours of restarting medication, but all have remained tolerable as of today. As such, we are escalating her dosing today to 400 mg daily.     AE Review:  - Depression - was gr 2 prior to stopping treatment, now better, still present but gr 1. Patient on medication management with Lexapro.   - Headaches, resolved off treatment, now back gr 1, tolerable.   - Nausea, resolved off medication, now back gr 1, tolerable. No vomiting  - Bone pain, resolved off treatment, not present today.   - Paresthesia (burning sensation shoulder and arm), resolved off treatment, not present today.   - Malaise, resolved off treatment, now back gr 1.  - Myalgia/Arthalgia - improved off treatment, still present gr 1  - Fatigue, improved to gr 1 off treatment. Feels it is is not relieved by rest any more, gr 2 today.   - Insomnia - stable gr 1    Medication Changes:  Increased Lexapro to 10mg daily    Medication Accountability/Dispensing:  Patient was dispensed 4 bottles of venetoclax, lot # [id number]. Patient will return empty  Chief Complaint   Patient presents with     Follow Up     Return CORE, 81 year old male with chronic systolic heart failure presents for follow up with labs prior.      Vitals were taken and medications reconciled.    Fritz Martinez, EMT  8:50 AM     bottles and unused pills at next visit    Patient will call to report any signs or symptoms that need attention and understands to return to clinic in 4 weeks for C4D1, scheduled on 1/17/24.

## 2024-03-12 NOTE — MR AVS SNAPSHOT
Tad Manning   9/25/2018   Anticoagulation Therapy Visit    Description:  77 year old male   Provider:  Chelsea Bland, RN   Department:  [facility]           INR as of 9/25/2018     Today's INR No new INR was available at the time of this encounter.      Anticoagulation Summary as of 9/25/2018     INR goal 2.0-3.0   Today's INR No new INR was available at the time of this encounter.   Full warfarin instructions 7.5 mg on Mon, Wed, Fri; 5 mg all other days   Next INR check 9/26/2018    Indications   Atrial fibrillation (H) [I48.91] [I48.91]  Long-term (current) use of anticoagulants [Z79.01] [Z79.01]         September 2018 Details    Sun Mon Tue Wed Thu Fri Sat           1                 2               3               4               5               6               7               8                 9               10               11               12               13               14               15                 16               17               18               19               20               21               22                 23               24               25      5 mg   See details      26            27               28               29                 30                      Date Details   09/25 This INR check       Date of next INR:  9/26/2018         How to take your warfarin dose     To take:  5 mg Take 1 of the 5 mg tablets.    To take:  7.5 mg Take 1.5 of the 5 mg tablets.            Subjective:  Chief complain :  chief complaint of Seizures , weakness    HPI:     69 y/o male with a PMHx of glioblastoma s/p large resection of mass on 10/3/22 with Dr. Turpin, seizure disorder on Keppra and Vimpat, and DVT/PE on Eliquis presents to the ED [facility] from [facility]. Upon entry to the ED, patient was obtunded with, according to ED physician, R sided hemiplegia. Decision was made in ED to intubate patient due to low GCS and inability to protect airway. EMS stated prior to arrival that the patient got 10mgIV valium due to witnessed seizure in the field. Patient was successfully intubated and started on prop drip. neuro consulted. Stated stop prop temporarily in order to get a ECG initial reading to check for continued seizures. Patient was also given 1500 kepra in the ED. ICU was called.      3/11 -  Patient seen and examined at bedside earlier today, + gen weakness, + some headache , denies cp, dyspnea, abdominal pain  3/12 - pt seen and examined , denies cp,+ dyspnea, + testicular pain, + abdominal pain, plan discussed     Review of system- Rest of the review of system are negative except mentioned in HPI    Vital sings reviewed for last 24 h  T(C): 36.4 (03-12-24 @ 08:41), Max: 36.5 (03-11-24 @ 22:20)  T(F): 97.5 (03-12-24 @ 08:41), Max: 97.7 (03-11-24 @ 22:20)  HR: 74 (03-12-24 @ 08:41) (71 - 94)  BP: 119/95 (03-12-24 @ 08:41) (104/64 - 123/80)  RR: 18 (03-12-24 @ 08:41) (16 - 18)  SpO2: 96% (03-12-24 @ 08:41) (92% - 96%)  Wt(kg): --  Daily     Daily   CAPILLARY BLOOD GLUCOSE      Physical exam:   General : NAD, appear to be of stated age , well groomed   NERVOUS SYSTEM:  Alert & Oriented X2, non- focal exam, Motor Strength 5/5 B/L upper and lower extremities; DTRs 2+ intact and symmetric  HEAD:  Atraumatic, Normocephalic  EYES: EOMI, PERRLA, conjunctiva and sclera clear  HEENT: Moist mucous membranes, Supple neck , No JVD  CHEST: Clear to auscultation bilaterally; No rales, no rhonchi, no wheezing  HEART: Regular rate and rhythm; No murmurs, no rubs or gallops  ABDOMEN: Soft, Non-tender, Non-distended; Bowel sounds present, no guarding , no peritoneal irritation   GENITOURINARY- Voiding, no suprapubic tenderness  EXTREMITIES:  2+ Peripheral Pulses, No clubbing, cyanosis,   edema  MUSCULOSKELETAL:- No muscle tenderness, Muscle tone normal, No joint tenderness, no Joint swelling,  Joint ROM –normal   SKIN-+  rash on testicles and bilateral groin  ,  no lesion      Labs radiologic and other test : all reviewed and interpreted :                         14.3   10.05 )-----------( 153      ( 12 Mar 2024 07:55 )             44.8     03-12    140  |  107  |  14  ----------------------------<  98  3.9   |  28  |  0.89    Ca    9.6      12 Mar 2024 07:55  Phos  2.4     03-12  Mg     2.0     03-12    TPro  6.8  /  Alb  2.5<L>  /  TBili  0.7  /  DBili  x   /  AST  28  /  ALT  51  /  AlkPhos  53  03-12    CARDIAC MARKERS ( 12 Mar 2024 07:55 )  x     / x     / 64 U/L / x     / x          LIVER FUNCTIONS - ( 12 Mar 2024 07:55 )  Alb: 2.5 g/dL / Pro: 6.8 gm/dL / ALK PHOS: 53 U/L / ALT: 51 U/L / AST: 28 U/L / GGT: x                   < from: Xray Abdomen 1 View PORTABLE -Routine (Xray Abdomen 1 View PORTABLE -Routine .) (03.08.24 @ 10:33) >  IMPRESSION:  No evidence of bowel obstruction.    < end of copied text >      < from: US Testicles (03.07.24 @ 16:46) >  IMPRESSION:    No acute abnormality detected.    < end of copied text >    < from: CT Abdomen and Pelvis w/ IV Cont (03.07.24 @ 12:05) >  IMPRESSION:    Patchy bibasilar atelectasis/consolidation.    No acute intra-abdominal pathology.    2 cm low density/cystic lesion pancreatic tail; stable.  If the patient is not  a candidate for follow-up MRI, recommend a   follow-up CT scan in 6-12 months.    Other findings as discussed above.    < end of copied text >      < from: CT Brain Perfusion Maps Stroke (03.07.24 @ 11:53) >  IMPRESSION:    HEAD CT: No acute intracranial hemorrhage or acute territorial   infarction. Stable exam since 10/22/2023    Notification to clinician of alert:  Dr. VANESSA LIM was notified about the noncontrast head CT findings at   11:57 AM on 3/7/2024 with readback confirmation. The opportunity for   questions was provided and all questions asked were answered.    CT PERFUSION demonstrated: No territorial perfusion abnormality. Abnormal   perfusion corresponding to the left parietal operative bed.    If symptoms persist consider follow-up imaging with contrast-enhanced MRI   of the brain if no contraindications.    CTA COW:  Patent intracranial circulation without flow limiting stenosis   or large vessel occlusion.    CTA NECK: Patent, ECAs, ICAs, no  hemodynamically significant stenosis at    ICA origins by NASCET criteria.  Bilateral vertebral arteries are patent without flow limiting stenosis.    < end of copied text >            RECENT CULTURES:      Cardiac testing : reviewed   EKG     Procedures :     Devices:     Current medications:  acetaminophen     Tablet .. 650 milliGRAM(s) Oral every 6 hours PRN  aluminum hydroxide/magnesium hydroxide/simethicone Suspension 30 milliLiter(s) Oral every 4 hours PRN  apixaban 5 milliGRAM(s) Oral every 12 hours  chlorhexidine 2% Cloths 1 Application(s) Topical <User Schedule>  dexAMETHasone     Tablet 2 milliGRAM(s) Oral daily  lacosamide IVPB 200 milliGRAM(s) IV Intermittent every 12 hours  levETIRAcetam   Injectable 1500 milliGRAM(s) IV Push every 12 hours  ondansetron Injectable 4 milliGRAM(s) IV Push every 8 hours PRN  pantoprazole  Injectable 40 milliGRAM(s) IV Push daily  polyethylene glycol 3350 17 Gram(s) Oral every 12 hours  senna 2 Tablet(s) Oral every 12 hours

## 2024-07-09 NOTE — H&P
"Post Admission Physician Evaluation:    I have compared Tad Manning's condition on admission to acute rehabilitation to that outlined in the preadmission screen. History and physical exam performed by me. 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    Comorbid medical conditions being managed: post-op pain, anemia, hyponatremia, and h/o bacteremia on IV antibiotic. PMH significant for DM with peripheral neuropathy, HTN, HLD, CAD s/p CABG, systolic and diastolic heart failure, and CLL in remission.     Prior functional level: I with all aspects of his life prior to 2016; has had gradual functional decline since then with fatigue, weakness in bilateral lower extremities and frequent falls     Present function: mod A of 2 for mobility and ADLs    Anticipated rehabilitation course: he will be most likely mod I with mobility and ADLs at  base level upon discharge; might be able to ambulate for short distances with assistive device and assistance but probably not functional. Anticipate longer course of rehab for ambulation, limited by diabetic neuropathy and sensory loss.     Will benefit from intensive rehabilitation includin minutes each of PT and OT - consider SLP consult pending OT cognitive eval   Rehabilitation nursing  Close management by physiatry    Prognosis: medical prognosis is good and he has been stable since his surgery on current regimen of antibiotic. Rehab prognosis is also reasonable given some improvement of his symptoms " China Billy MA 7/9/2024 8:05 AM EDT      ----- Message -----  From: Brandon Stephenson \"Brandon Stephenson\"  Sent: 7/9/2024 4:53 AM EDT  To: *  Subject: Change Back     Dr. Rangel, just wanted you to know that I have an appointment scheduled with you in October, that's the earliest I could possibly get.   My question is, you remember several months ago you had prescribed the Norco 10 and the reason for the change to the current Oxycodone 5 was the pharmacy's were having difficulty obtaining the Norco ( so they say ).   Could you change me back to the Norco 10? They seem to have the medication back to normal inventory.   I'm still maintaining, nothing has really changed as far as my feet are concerned, I have had a little swelling now and then but my primary provider checked them during my yearly lab work and she didn't see anything to be concerned about. All my vitals have been in normal limits, I still walk with a limp due to having pain in both feet, seems more on the left foot and not as severe in the right.   I'm more busier now due to my wife's illness taking care of her, she's completely disabled from the cancer I've mentioned to you about. It's been extremely hard to have to care for the one you love as your soulmate, but I stand by my vows!   I apologize for rambling, so will you please change this back because I feel like it helps a lot better than this current medication I'm on.   Thanks for listening to me share some of my life's issues, and Thanks in Advance For Help You Can Provide!  Sincerely,   Brandon MACK      post-op.    Estimated length of stay: 3-4 weeks.     Hyun Koehler MD  Physical Medicine & Rehabilitation

## 2025-04-22 NOTE — ANESTHESIA PREPROCEDURE EVALUATION
Procedure: Procedure(s):  AMPUTATE REVISION STUMP LOWER EXTREMITY  Preop diagnosis: DIABETIC RIGHT FOOT WITH OSTEOMYLETIS    Allergies   Allergen Reactions     Blood Transfusion Related (Informational Only) Other (See Comments)     Patient has a history of a clinically significant antibody against RBC antigens.  A delay in compatible RBCs may occur.      Past Medical History:   Diagnosis Date     Arthritis      ASCVD (arteriosclerotic cardiovascular disease)      BMI 30.0-30.9,adult      BPH (benign prostatic hypertrophy)      Cellulitis      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)      Coronary artery disease     triple bypass 1995     Diabetes mellitus (H)      Diabetic polyneuropathy (H)      History of blood transfusion      Hyperlipidaemia      Hypertension      Non-healing ulcer of foot (H)     Right     Noninflammatory pericardial effusion      Osteomyelitis of left foot (H)      PVD (peripheral vascular disease) (H)      Sebaceous cyst      Past Surgical History:   Procedure Laterality Date     AMPUTATE LEG BELOW KNEE Right 10/8/2018    Procedure: AMPUTATE LEG BELOW KNEE;  OPEN RIGHT LOWER LEG AMPUTATION WITH WOUND VAC PLACEMENT    EBL: 50mL;  Surgeon: Amador Vick MD;  Location:  OR     AMPUTATE TOE(S)  1/10/2014    Procedure: AMPUTATE TOE(S);;  Surgeon: Amador Vick MD;  Location:  OR     APPLY WOUND VAC Right 10/8/2018    Procedure: APPLY WOUND VAC;;  Surgeon: Amador Vick MD;  Location:  OR     BONE MARROW BIOPSY, BONE SPECIMEN, NEEDLE/TROCAR  10/19/2012    Procedure: BIOPSY BONE MARROW;  BONE MARROW BIOPSY  (CONSCIOUS SED);  Surgeon: Ramon Quesada MD;  Location:  GI     BYPASS GRAFT FEMOROPOPLITEAL  1/10/2014    Procedure: BYPASS GRAFT FEMOROPOPLITEAL;  Left above knee popliteal to  Below knee popliteal bypass using transverse saphenous vein graft. Left 2nd Toe amputation;  Surgeon: Amador Vick MD;  Location:  OR      Returned patient call, assisted in rescheduling due to provider being booked out.      CARDIAC SURGERY      angioplasty with stent, triple bypass     EXCISE CYST GENERIC (LOCATION) N/A 2/1/2016    Procedure: EXCISE CYST GENERIC (LOCATION);  Surgeon: Amador Vick MD;  Location: [facility]     GRAFT VEIN FROM EXTREMITY (LOCATION)  1/10/2014    Procedure: GRAFT VEIN FROM EXTREMITY (LOCATION);;  Surgeon: Amador Vick MD;  Location: [facility]     LAMINECTOMY THORACIC ONE LEVEL N/A 2/8/2017    Procedure: LAMINECTOMY THORACIC ONE LEVEL;  Surgeon: Marcelo Trent MD;  Location: [facility] OR     OPTICAL TRACKING SYSTEM FUSION POSTERIOR SPINE THORACIC THREE+ LEVELS N/A 2/12/2017    Procedure: OPTICAL TRACKING SYSTEM FUSION POSTERIOR SPINE THORACIC THREE+ LEVELS;  Surgeon: Marcelo Trent MD;  Location: [facility] OR     TONSILLECTOMY       VASCULAR SURGERY      ptcr legs     Social History   Substance Use Topics     Smoking status: Former Smoker     Packs/day: 2.00     Years: 30.00     Types: Cigarettes     Quit date: 6/19/1995     Smokeless tobacco: Never Used     Alcohol use No     Prior to Admission medications    Medication Sig Start Date End Date Taking? Authorizing Provider   acetaminophen (TYLENOL) 325 MG tablet Take 325 mg by mouth every 4 hours as needed for mild pain   Yes Reported, Patient   atorvastatin (LIPITOR) 20 MG tablet TAKE 1 TABLET BY MOUTH EVERY DAY 4/9/18  Yes Clarisse Valdes MD   cholecalciferol 1000 UNITS TABS Take 1,000 Units by mouth daily 4/8/17  Yes Derek Navas DDS   Citalopram Hydrobromide (CELEXA PO) Take 10 mg by mouth daily   Yes Reported, Patient   clopidogrel (PLAVIX) 75 MG tablet Take 1 tablet (75 mg) by mouth daily 3/7/18  Yes Russell Gaines MD   furosemide (LASIX) 20 MG tablet Take 2 tablets (40 mg) by mouth daily 9/27/18  Yes Jess Chi, NP   hydrocortisone (CORTIZONE-10) 1 % ointment Apply topically 2 times daily as needed for itching   Yes Unknown, Entered By History   MELATONIN PO Take 5 mg by mouth daily   Yes Reported, Patient   metFORMIN  (GLUCOPHAGE) 500 MG tablet Take 1 tablet (500 mg) by mouth 2 times daily (with meals) 4/8/17  Yes Derek Navas DDS   metoprolol succinate (TOPROL-XL) 200 MG 24 hr tablet Take 0.5 tablets (100 mg) by mouth daily 5/18/18  Yes Rasheeda Stephen PA-C   nitroglycerin (NITROSTAT) 0.4 MG sublingual tablet For chest pain place 1 tablet under the tongue every 5 minutes for 3 doses. If symptoms persist 5 minutes after 1st dose call 911. 4/8/17  Yes Derek Navas DDS   nystatin (MYCOSTATIN) cream Apply topically daily as needed    Yes Reported, Patient   ranitidine (ZANTAC) 150 MG tablet Take 1 tablet (150 mg) by mouth 2 times daily  Patient taking differently: Take 150 mg by mouth 2 times daily as needed for heartburn  4/8/17  Yes Derek Navas DDS   sodium chloride (EQ SALINE NASAL SPRAY) 0.65 % nasal spray Spray 1 spray into both nostrils daily as needed for congestion 4/9/18  Yes Clarisse Valdes MD   warfarin (COUMADIN) 5 MG tablet Take 0.5 tablets tonight 9/24 and 1 tab tomorrow night 9/25. INR check on Wednesday 9/26 and continue as directed by coumadin clinic. 9/24/18  Yes Fatou Maldonado PA   order for DME Equipment being ordered: DH2 shoe 7/21/16   Enzo Alonso DPM     Current Facility-Administered Medications Ordered in Epic   Medication Dose Route Frequency Last Rate Last Dose     [Auto Hold] acetaminophen (TYLENOL) tablet 650 mg  650 mg Oral Q4H PRN   650 mg at 10/03/18 0023     [Auto Hold] atorvastatin (LIPITOR) tablet 20 mg  20 mg Oral Daily   20 mg at 10/10/18 0832     [Auto Hold] benzocaine-menthol (CHLORASEPTIC) 6-10 MG lozenge 1 lozenge  1 lozenge Buccal Q1H PRN         [Auto Hold] bisacodyl (DULCOLAX) Suppository 10 mg  10 mg Rectal Daily PRN   10 mg at 10/03/18 1333     ceFAZolin (ANCEF) 1 g vial to attach to  ml bag for ADULT or 50 ml bag for PEDS  1 g Intravenous See Admin Instructions         ceFAZolin (ANCEF) intermittent infusion 2 g in 100 mL dextrose PRE-MIX  2 g Intravenous  Pre-Op/Pre-procedure x 1 dose         [Auto Hold] cholecalciferol (vitamin D3) tablet 1,000 Units  1,000 Units Oral Daily   1,000 Units at 10/10/18 0832     [Auto Hold] citalopram (celeXA) tablet 10 mg  10 mg Oral Daily   10 mg at 10/10/18 0832     [Auto Hold] clopidogrel (PLAVIX) tablet 75 mg  75 mg Oral Daily   75 mg at 10/10/18 0832     [Auto Hold] glucose gel 15-30 g  15-30 g Oral Q15 Min PRN        Or     [Auto Hold] dextrose 50 % injection 25-50 mL  25-50 mL Intravenous Q15 Min PRN        Or     [Auto Hold] glucagon injection 1 mg  1 mg Subcutaneous Q15 Min PRN         [Auto Hold] ferrous sulfate (IRON) tablet 325 mg  325 mg Oral Daily   325 mg at 10/10/18 0832     [Auto Hold] furosemide (LASIX) injection 40 mg  40 mg Intravenous BID   40 mg at 10/10/18 1709     [Auto Hold] HYDROmorphone (PF) (DILAUDID) injection 0.2-0.4 mg  0.2-0.4 mg Intravenous Q4H PRN         [Auto Hold] insulin aspart (NovoLOG) inj (RAPID ACTING)  10 Units Subcutaneous TID w/meals   10 Units at 10/10/18 1808     [Auto Hold] insulin aspart (NovoLOG) inj (RAPID ACTING)  1-7 Units Subcutaneous TID AC   1 Units at 10/10/18 1808     [Auto Hold] insulin aspart (NovoLOG) inj (RAPID ACTING)  1-5 Units Subcutaneous At Bedtime   1 Units at 10/08/18 2303     [Auto Hold] insulin glargine (LANTUS) injection 16 Units  16 Units Subcutaneous At Bedtime   16 Units at 10/09/18 2158     lactated ringers infusion   Intravenous Continuous 25 mL/hr at 10/11/18 0950       lidocaine 1 % 1 mL  1 mL Other Q1H PRN         [Auto Hold] melatonin tablet 5 mg  5 mg Oral At Bedtime   5 mg at 10/10/18 2200     [Auto Hold] metoprolol succinate (TOPROL-XL) 24 hr tablet 100 mg  100 mg Oral Daily   100 mg at 10/10/18 2014     [Auto Hold] miconazole (MICATIN; MICRO GUARD) 2 % powder   Topical TID         [Auto Hold] naloxone (NARCAN) injection 0.1-0.4 mg  0.1-0.4 mg Intravenous Q2 Min PRN         [Auto Hold] ondansetron (ZOFRAN-ODT) ODT tab 4 mg  4 mg Oral Q6H PRN        Or      [Auto Hold] ondansetron (ZOFRAN) injection 4 mg  4 mg Intravenous Q6H PRN         [Auto Hold] oxyCODONE IR (ROXICODONE) tablet 5-10 mg  5-10 mg Oral Q3H PRN   10 mg at 10/11/18 0142     [Auto Hold] pantoprazole (PROTONIX) EC tablet 40 mg  40 mg Oral BID AC   40 mg at 10/10/18 1709     Patient RECEIVING antibiotic to treat a different condition and it provides ADEQUATE COVERAGE for this surgical procedure.  1 each As instructed Continuous         [Auto Hold] piperacillin-tazobactam (ZOSYN) 3.375 g vial to attach to  mL bag  3.375 g Intravenous Q6H 200 mL/hr at 10/11/18 0852 3.375 g at 10/11/18 0852     [Auto Hold] polyethylene glycol (MIRALAX/GLYCOLAX) Packet 17 g  17 g Oral BID PRN         [Auto Hold] polyethylene glycol (MIRALAX/GLYCOLAX) Packet 34 g  34 g Oral Once         [Auto Hold] senna-docusate (SENOKOT-S;PERICOLACE) 8.6-50 MG per tablet 1 tablet  1 tablet Oral BID PRN   1 tablet at 10/03/18 0930    Or     [Auto Hold] senna-docusate (SENOKOT-S;PERICOLACE) 8.6-50 MG per tablet 2 tablet  2 tablet Oral BID PRN   2 tablet at 10/07/18 1727     [Auto Hold] sennosides (SENOKOT) tablet 2 tablet  2 tablet Oral BID   2 tablet at 10/10/18 0832     [Auto Hold] sodium chloride (PF) 0.9% PF flush 3 mL  3 mL Intracatheter Q1H PRN   3 mL at 10/07/18 0031     [Auto Hold] sodium chloride (PF) 0.9% PF flush 3 mL  3 mL Intracatheter Q8H   3 mL at 10/10/18 1810     No current [facility]-ordered outpatient prescriptions on file.       lactated ringers 25 mL/hr at 10/11/18 0950     Patient RECEIVING antibiotic to treat a different condition and it provides ADEQUATE COVERAGE for this surgical procedure.       Wt Readings from Last 1 Encounters:   10/11/18 81.6 kg (179 lb 14.3 oz)     Temp Readings from Last 1 Encounters:   10/11/18 37.5  C (99.5  F) (Temporal)     BP Readings from Last 6 Encounters:   10/11/18 120/69   09/27/18 104/66   09/24/18 127/52   07/16/18 105/69   06/20/18 107/70   05/18/18 116/75     Pulse Readings from  Last 4 Encounters:   10/11/18 65   09/27/18 86   09/24/18 58   07/16/18 72     Resp Readings from Last 1 Encounters:   10/11/18 20     SpO2 Readings from Last 1 Encounters:   10/11/18 96%     Recent Labs   Lab Test  10/11/18   0821  10/08/18   0812  10/07/18   1325   NA   --   136  135   POTASSIUM  4.1  3.5  3.7   CHLORIDE   --   103  100   CO2   --   26  27   ANIONGAP   --   7  8   GLC   --   171*  232*   BUN   --   19  20   CR   --   1.21  1.35*   JUSTINO   --   8.7  8.8     Recent Labs   Lab Test  10/03/18   0636  09/22/18   0042   AST  15  11   ALT  14  27   ALKPHOS  73  126   BILITOTAL  0.6  0.9     Recent Labs   Lab Test  10/11/18   0821  10/08/18   0812  10/07/18   1325   WBC   --   4.5  6.4   HGB  8.4*  9.5*  10.2*   PLT  137*  133*  154     Recent Labs   Lab Test  10/11/18   0821   ABO  A   RH  Pos     Recent Labs   Lab Test  10/08/18   0812  10/04/18   0839  10/03/18   0636   INR  1.20*  1.37*   --    PTT   --   33  101*      Recent Labs   Lab Test  03/13/17   0758  03/12/17   2123  02/07/17   1855   TROPI  0.026  0.030  <0.015  The 99th percentile for upper reference range is 0.045 ug/L.  Troponin values in   the range of 0.045 - 0.120 ug/L may be associated with risks of adverse   clinical events.       Recent Labs   Lab Test  02/12/17   1103  02/12/17   1012   PH  7.38  7.39   PCO2  41  40   PO2  243*  262*   HCO3  24  24     No results for input(s): HCG in the last 92223 hours.  Recent Results (from the past 744 hour(s))   POC US Retroperitoneal Limited    Impression    [facility] Procedure Note    Limited Bedside ED Renal Ultrasound:    PERFORMED BY: Dr. Jude Chirinos  INDICATIONS:  Acute kidney injury  PROBE: Low frequency convex probe  BODY LOCATION:  Abdomen  FINDINGS:  The ultrasound was performed with longitudinal and transverse views.   Right Kidney:   Hydronephrosis:  None   Renal cyst:  None  Left Kidney:   Hydronephrosis:  None   Renal cyst:  Single  INTERPRETATION:  No hydronephrosis,  single simple cyst in left kidney.   IMAGE DOCUMENTATION: Images were archived to PACs system.    [facility] Procedure Note      Limited Bedside ED Ultrasound of the Urinary Bladder:    PERFORMED BY: Dr. Jude Chirinos  INDICATIONS:  Acute kidney injury  PROBE: Low frequency convex probe  BODY LOCATION:  Abdomen  FINDINGS:  Visualization of the bladder in longitudinal and transverse views demonstrated a mildly full state post-void.  The bladder dimensions measured: 8.1 cm width X 7.6 height cm X 5.1 cm length. Calculated volume 255 ml. Incidental prostate hypertrophy noted.   INTERPRETATION:  Total calculated volume was estimated at 255 ml post-void.    IMAGE DOCUMENTATION: Images were archived to PACs system.       CT Head w/o Contrast    Narrative    CT HEAD W/O CONTRAST 9/22/2018 10:42 AM    Provided History: Hx of fall, hit head - supratherapeutic INR;     Comparison: 1/24/2017.    Technique: Using multidetector thin collimation helical acquisition  technique, axial, coronal and sagittal CT images from the skull base  to the vertex were obtained without intravenous contrast.     Findings:    No intracranial hemorrhage, mass effect, or midline shift. The  ventricles are proportionate to the cerebral sulci. The gray to white  matter differentiation of the cerebral hemispheres is preserved. The  basal cisterns are patent. Mild encephalization in the anterior  parasagittal frontal lobes bilaterally. Age-related parenchymal volume  loss. Scattered periventricular and subcortical white matter  hypodensities, nonspecific though likely sequelae of chronic small  vessel ischemic disease. Chronic lacunar infarct versus prominent  perivascular spaces in the posterior limb of the right internal  capsule.    The visualized paranasal sinuses are clear. The mastoid air cells are  clear.       Impression    Impression:   1. No acute intracranial pathology.  2. Generalized parenchymal volume loss and mild  leukoaraiosis.    I have personally reviewed the examination and initial interpretation  and I agree with the findings.    RAMILA GODOY MD   MR Foot Right w/o & w Contrast    Narrative    MR FOOT RIGHT WITH AND WITHOUT CONTRAST October 1, 2018 6:38 AM    HISTORY: Open wound lateral plantar right forefoot for six to seven  weeks. Diabetic patient. Evaluate for osteomyelitis.    TECHNIQUE: Multisequence multiplanar MRI without and with IV contrast.  8 mL Gadavist given intravenously.    COMPARISON: None.    FINDINGS: Some sequences are compromised by motion artifact. Area of  presumed current skin ulcer is seen in the lateral plantar region at  the level of the fifth metatarsal head (image 4 of series 5). The  plantar soft tissues of the little toe are swollen but show decreased  contrast enhancement consistent with tissue devitalization. There is  no evidence for an abscess in this region. There is mild bone marrow  edema in the fifth metatarsal head as well as at the base of the fifth  proximal phalanx. This may be reactive edema alone, especially since  the ulcer appears relatively shallow. Also, there is no cortical  effacement. However, very early osteomyelitis is not excluded (image 4  of series 5). No fifth metatarsophalangeal joint effusion.    The remainder of the bony structures are unremarkable. Edema and mild  contrast enhancement of the plantar intrinsic muscles of the foot,  commonly seen in diabetic patients. There is some unusual foci of  decreased signal in the plantar soft tissues at the level of the  second and third metatarsal heads. This appears to be some type of  postoperative change.      Impression    IMPRESSION:  1. Plantar lateral skin ulcer at the level of the fifth metatarsal  head. Devitalized soft tissue thickening distal to the ulcer in the  plantar fifth toe.  2. Nonspecific mild bone marrow edema in the fifth metatarsal head and  proximal phalanx base may be reactive change alone  but early  osteomyelitis is not excluded.  3. Probable postoperative changes in the soft tissues of the distal  forefoot more medially.    RACHAEL SPARKS MD   US WALLACE Doppler No Exercise    Narrative    US WALLACE DOPPLER NO EXERCISE, 1-2 LEVELS,??? BILAT  10/1/2018 5:13 PM     HISTORY: Assess blood flow, osteomyelitis of the right foot.    COMPARISON: None.    FINDINGS:   The resting right and left ankle-brachial indices are 0.60 and 1.05  respectively.    Waveform analysis indicates monophasic waveforms in the distal right  tibial arteries and biphasic to triphasic waveforms in the distal left  tibial arteries.      Impression    IMPRESSION: WALLACE on the left is within normal limits. WALLACE on the right  would indicate moderate arterial insufficiency. Given osteomyelitis of  the right foot, further evaluation and possible intervention with  angiography could be performed.      WALLACE Diagnostic Criteria      >/= 1.3          Non compressible  0.95-1.0          Normal  0.90-0.94        Mild PAD  0.50-0.89        Moderate PAD  0.20-0.49        Severe PAD  <0.20               Critical    DYLAN MILLER MD   XR Chest Port 1 View    Narrative    CHEST ONE VIEW PORTABLE       PROCEDURE DATE:  10/4/2018 6:26 AM     HISTORY:   Evaluate CHF;     COMPARISON:  5/25/2017    FINDINGS/    Impression    IMPRESSION:   The right costophrenic angle and right lung bases is incompletely  imaged and therefore not evaluated. Postsurgical changes from prior  CABG included sternotomy wires and mediastinal clips. Posterior spinal  fusion hardware is present. Cardiomediastinal silhouette is not  enlarged. Increased interstitial markings and perihilar opacities  favoring pulmonary edema.    RUTH ANN JURADO MD   XR Chest 1 View    Narrative    CHEST ONE VIEW UPRIGHT 10/4/2018 8:35 AM     HISTORY: Pre-op.     COMPARISON: October 4, 2018      Impression    IMPRESSION: No acute infiltrates. There is a sternotomy and vascular  clips consistent with  CABG.    ROSANA HERNANDEZ MD       RECENT LABS:   ECG:   ECHO:       Anesthesia Evaluation     . Pt has had prior anesthetic. Type: General    No history of anesthetic complications          ROS/MED HX    ENT/Pulmonary:     (+)tobacco use, Past use , . .    Neurologic:     (+)neuropathy     Cardiovascular: Comment: Fluid overload over weekend related to blood transfusion requiring diuresis    (+) Dyslipidemia, hypertension-Peripheral Vascular Disease-CAD, -CABG (3 vessel CABG)-stent (BHAVNA x 2 to LAD 3/2018 - Plavix),Drug Eluting Stent .. Taking blood thinners (plavix/ coumadin) : . CHF etiology: systolic dysfunction . . :. dysrhythmias a-fib and a-flutter, . Previous cardiac testing Echodate:10/5/18results:The visual ejection fraction is estimated at 35-40%.  There is mild-moderate global hypokinesia of the left ventricle.  Mildly decreased right ventricular systolic functiondate: results: date: results: date: results:          METS/Exercise Tolerance:  3 - Able to walk 1-2 blocks without stopping   Hematologic:     (+) Anemia, History of Transfusion -      Musculoskeletal: Comment: Right lower extremity  Diabetic foot ulcer with osteomyelitis  (+) arthritis, , , -       GI/Hepatic:        (-) GERD and hiatal hernia   Renal/Genitourinary:     (+) chronic renal disease (stage 3 chronic kidney disease), BPH,       Endo:     (+) type II DM Diabetic complications: nephropathy neuropathy cardiac problems, .      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    ASA Status:  4 .    NPO Status:  > 8 hours    Plan for General and LMA with Intravenous and Propofol induction. Maintenance will be Inhalation.    PONV prophylaxis:  Ondansetron (or other 5HT-3) and Dexamethasone or Solumedrol  GA with LMA  Consider nerve block for postoperative pain if needed      Postoperative Care  Postoperative pain management:  IV analgesics.      Consents  Anesthetic plan, risks, benefits and alternatives discussed with:  Patient..                          .

## 2025-05-09 NOTE — ED NOTES
Lov 03/19/25  Upcoming visit 05/19/25  Please see the attached refill request.   Patient lying on cart with family at bedside. RN updated patient and guest on continued POC and waits. Patient verbalized understanding. Patient placed on ECG NIBP and SaO2 monitoring. VSS. Patient denies any chest pain, pressure, tightness, heaviness, or shortness of breath. Patient given warm blanket for comfort. Patient HOB adjusted for comfort. Lights dimmed for comfort. Continue to monitor.

## (undated) DEVICE — CELL SAVER

## (undated) DEVICE — IMP SCR MEDT 5.5/6.0MM SOLERA 6.5X55MM MA 55840006555: Type: IMPLANTABLE DEVICE | Site: SPINE THORACIC | Status: NON-FUNCTIONAL

## (undated) DEVICE — ESU GROUND PAD UNIVERSAL W/O CORD

## (undated) DEVICE — SU SILK 2-0 TIE 24" SA75H

## (undated) DEVICE — DRSG XEROFORM 1X8"

## (undated) DEVICE — SYR 30ML LL W/O NDL 302832

## (undated) DEVICE — SOL WATER IRRIG 1000ML BOTTLE 2F7114

## (undated) DEVICE — DRAIN JACKSON PRATT 07MM FLAT 3/4 PERF

## (undated) DEVICE — PACK NEURO MINOR UMMC SNE32MNMU4

## (undated) DEVICE — Device

## (undated) DEVICE — SU SILK 3-0 SH CR 8X18" C013D

## (undated) DEVICE — SU VICRYL 2-0 CT-2 27" J333H

## (undated) DEVICE — SLEEVE TR BAND RADIAL COMPRESSION DEVICE 24CM TRB24-REG

## (undated) DEVICE — DRAPE C-ARM W/STRAPS 42X72" 07-CA104

## (undated) DEVICE — GLIDEWIRE TERUMO .035X180CM 1.5,, J-TIP GR3525

## (undated) DEVICE — CATH ANGIO INFINITI JR4 4FRX100CM 538421

## (undated) DEVICE — MOUSE O ARM 9732721

## (undated) DEVICE — SOL NACL 0.9% IRRIG 1000ML BOTTLE 07138-09

## (undated) DEVICE — MANIFOLD KIT ANGIO AUTOMATED 014613

## (undated) DEVICE — SU SILK 2-0 SH 30" K833H

## (undated) DEVICE — DRAPE O ARM BAR MEDTRONIC 9733023

## (undated) DEVICE — RAD G/W INQWIRE .035X260CM J-TIP EXCHANGE IQ35F260J1O5RS

## (undated) DEVICE — PREP CHLORAPREP 26ML TINTED ORANGE  260815

## (undated) DEVICE — LINEN TOWEL PACK X5 5464

## (undated) DEVICE — BUR MATCHSTICK 3MM ANSPACH L-8NS-G1

## (undated) DEVICE — NDL BLUNT 18GA 1" W/O FILTER 305181

## (undated) DEVICE — SU VICRYL 3-0 SH CR 8X18" J774

## (undated) DEVICE — SOL NACL 0.9% 10ML VIAL 0409-4888-02

## (undated) DEVICE — BLADE SAW SAGITTAL STRK 25X90X1.37MM 4H SYS 6 6125-137-090

## (undated) DEVICE — CATH ANGIO INFINITI IM 4FRX100CM 538460

## (undated) DEVICE — WIPES FOLEY CARE SURESTEP PROVON DFC100

## (undated) DEVICE — DRAIN JACKSON PRATT 15FR ROUND SU130-1323

## (undated) DEVICE — GLOVE PROTEXIS BLUE W/NEU-THERA 8.5  2D73EB85

## (undated) DEVICE — DRSG PRIMAPORE 03 1/8X6" 66000318

## (undated) DEVICE — SOL ADH LIQUID BENZOIN SWAB 0.6ML C1544

## (undated) DEVICE — PREP CHLORAPREP CLEAR 3ML 260400

## (undated) DEVICE — DRAPE MAYO STAND 23X54 8337

## (undated) DEVICE — CATH DIAG 4FR JL 4.5 538417

## (undated) DEVICE — SPONGE SURGIFOAM 100 1974

## (undated) DEVICE — SPONGE LAP 18X18" X8435

## (undated) DEVICE — SU ETHILON 3-0 FS-1 18" 669H

## (undated) DEVICE — CANISTER WOUND VAC W/GEL 500ML M8275063/5

## (undated) DEVICE — DRAIN HEMOVAC RESERVOIR KIT 10FR 1/8" MED 00-2550-002-10

## (undated) DEVICE — LINEN TOWEL PACK X30 5481

## (undated) DEVICE — SPONGE COTTONOID 1/2X1/2" 20-04S

## (undated) DEVICE — DEFIB PRO-PADZ LVP LQD GEL ADULT 8900-2105-01

## (undated) DEVICE — LINEN LEG ROLL 5489

## (undated) DEVICE — SU VICRYL 3-0 SH 27" J316H

## (undated) DEVICE — NDL COUNTER 20CT 31142493

## (undated) DEVICE — SU VICRYL 0 CT-1 CR 8X18" J740D

## (undated) DEVICE — PACK TOTAL KNEE SOP15TKFSD

## (undated) DEVICE — SUCTION MANIFOLD DORNOCH ULTRA CART UL-CL500

## (undated) DEVICE — BLADE BONE MILL STRK 3.2MM FINE 5400-702-000

## (undated) DEVICE — PACK GOWN 3/PK DISP XL SBA32GPFCB

## (undated) DEVICE — ESU PENCIL W/SMOKE EVAC NEPTUNE STRYKER 0703-046-000

## (undated) DEVICE — DRSG MEDIPORE 3 1/2X13 3/4" 3573

## (undated) DEVICE — SU SILK 2-0 TIE 18' A185H

## (undated) DEVICE — DRAPE BACK TABLE  44X90" 8377

## (undated) DEVICE — ESU PENCIL W/COATED BLADE E2450H

## (undated) DEVICE — RAD INTRODUCER KIT MICRO 5FRX10CM .018 NITINOL G/W

## (undated) DEVICE — INTRO GLIDESHEATH SLENDER 6FR 10X45CM 60-1060

## (undated) DEVICE — SYR 10ML LL W/O NDL

## (undated) DEVICE — BONE COLLECTOR ANSPACH B-COLLECTOR-1

## (undated) DEVICE — SMART CAPNOLINE H PLUS, ADULT/INTERMEDIATE O2, LONG

## (undated) DEVICE — APPLICATOR COTTON TIP 6"X2 STERILE LF 6012

## (undated) DEVICE — MANIFOLD NEPTUNE 4 PORT 700-20

## (undated) DEVICE — DRSG ABSB SUREPREP WIPE SKIN PROTECTION MSC1500

## (undated) DEVICE — DRAPE SHEET REV FOLD 3/4 9349

## (undated) DEVICE — NDL BLUNT 17GA 1.5" 8881202330

## (undated) DEVICE — TOTE ANGIO CORP PC15AT SAN32CC83O

## (undated) DEVICE — DRSG ABDOMINAL 07 1/2X8" 7197D

## (undated) DEVICE — SU SILK 3-0 TIE 24" SA74H

## (undated) DEVICE — KIT HAND CONTROL ANGIOTOUCH ACIST 65CM AT-P65

## (undated) DEVICE — SU ETHILON 3-0 PS-1 18" 1663H

## (undated) DEVICE — DRSG KERLIX FLUFFS X5

## (undated) DEVICE — MARKER SPHERES PASSIVE MEDT PACK 5 8801075

## (undated) DEVICE — SOL NACL 0.9% IRRIG 1000ML BOTTLE 2F7124

## (undated) DEVICE — SU VICRYL 2-0 CT-2 CR 8X18" J726D

## (undated) DEVICE — DRAIN JACKSON PRATT RESERVOIR 100ML SU130-1305

## (undated) DEVICE — NDL SPINAL 18GA 3.5" 405184

## (undated) DEVICE — INTRO SHEATH 4FRX10CM PINNACLE RSS402

## (undated) DEVICE — BONE WAX 2.5GM W31G

## (undated) DEVICE — LINEN TOWEL PACK X6 WHITE 5487

## (undated) DEVICE — DRSG DRAIN 2X2" 7087

## (undated) DEVICE — ESU GROUND PAD ADULT W/CORD E7507

## (undated) DEVICE — DRSG KERLIX 4 1/2"X4YDS ROLL 6715

## (undated) DEVICE — TEST TUBE W/SCREW CAP 17361

## (undated) DEVICE — IOM SUPPLY

## (undated) DEVICE — GLOVE PROTEXIS POWDER FREE 8.0 ORTHOPEDIC 2D73ET80

## (undated) RX ORDER — HEPARIN SODIUM 200 [USP'U]/100ML
INJECTION, SOLUTION INTRAVENOUS
Status: DISPENSED
Start: 2021-06-22

## (undated) RX ORDER — PROPOFOL 10 MG/ML
INJECTION, EMULSION INTRAVENOUS
Status: DISPENSED
Start: 2018-10-11

## (undated) RX ORDER — SODIUM CHLORIDE 9 MG/ML
INJECTION, SOLUTION INTRAVENOUS
Status: DISPENSED
Start: 2018-03-06

## (undated) RX ORDER — HYDROMORPHONE HYDROCHLORIDE 1 MG/ML
INJECTION, SOLUTION INTRAMUSCULAR; INTRAVENOUS; SUBCUTANEOUS
Status: DISPENSED
Start: 2017-02-12

## (undated) RX ORDER — BACITRACIN 50000 [IU]/1
INJECTION, POWDER, FOR SOLUTION INTRAMUSCULAR
Status: DISPENSED
Start: 2017-02-09

## (undated) RX ORDER — VERAPAMIL HYDROCHLORIDE 2.5 MG/ML
INJECTION, SOLUTION INTRAVENOUS
Status: DISPENSED
Start: 2021-06-22

## (undated) RX ORDER — HYDROMORPHONE HYDROCHLORIDE 1 MG/ML
INJECTION, SOLUTION INTRAMUSCULAR; INTRAVENOUS; SUBCUTANEOUS
Status: DISPENSED
Start: 2017-02-09

## (undated) RX ORDER — HYDROMORPHONE HYDROCHLORIDE 1 MG/ML
INJECTION, SOLUTION INTRAMUSCULAR; INTRAVENOUS; SUBCUTANEOUS
Status: DISPENSED
Start: 2018-10-08

## (undated) RX ORDER — BUPIVACAINE HYDROCHLORIDE AND EPINEPHRINE 2.5; 5 MG/ML; UG/ML
INJECTION, SOLUTION EPIDURAL; INFILTRATION; INTRACAUDAL; PERINEURAL
Status: DISPENSED
Start: 2017-02-09

## (undated) RX ORDER — BUPIVACAINE HYDROCHLORIDE AND EPINEPHRINE 2.5; 5 MG/ML; UG/ML
INJECTION, SOLUTION EPIDURAL; INFILTRATION; INTRACAUDAL; PERINEURAL
Status: DISPENSED
Start: 2017-02-12

## (undated) RX ORDER — NITROGLYCERIN 5 MG/ML
VIAL (ML) INTRAVENOUS
Status: DISPENSED
Start: 2018-03-06

## (undated) RX ORDER — LIDOCAINE HYDROCHLORIDE 20 MG/ML
INJECTION, SOLUTION EPIDURAL; INFILTRATION; INTRACAUDAL; PERINEURAL
Status: DISPENSED
Start: 2018-10-08

## (undated) RX ORDER — DEXAMETHASONE SODIUM PHOSPHATE 4 MG/ML
INJECTION, SOLUTION INTRA-ARTICULAR; INTRALESIONAL; INTRAMUSCULAR; INTRAVENOUS; SOFT TISSUE
Status: DISPENSED
Start: 2018-10-08

## (undated) RX ORDER — FUROSEMIDE 10 MG/ML
INJECTION INTRAMUSCULAR; INTRAVENOUS
Status: DISPENSED
Start: 2018-03-06

## (undated) RX ORDER — FENTANYL CITRATE 50 UG/ML
INJECTION, SOLUTION INTRAMUSCULAR; INTRAVENOUS
Status: DISPENSED
Start: 2018-10-11

## (undated) RX ORDER — HEPARIN SODIUM 1000 [USP'U]/ML
INJECTION, SOLUTION INTRAVENOUS; SUBCUTANEOUS
Status: DISPENSED
Start: 2018-03-06

## (undated) RX ORDER — HYDROMORPHONE HYDROCHLORIDE 1 MG/ML
INJECTION, SOLUTION INTRAMUSCULAR; INTRAVENOUS; SUBCUTANEOUS
Status: DISPENSED
Start: 2018-10-11

## (undated) RX ORDER — CLOPIDOGREL BISULFATE 75 MG/1
TABLET ORAL
Status: DISPENSED
Start: 2018-03-06

## (undated) RX ORDER — ACETAMINOPHEN 500 MG
TABLET ORAL
Status: DISPENSED
Start: 2018-10-08

## (undated) RX ORDER — SODIUM CHLORIDE, SODIUM LACTATE, POTASSIUM CHLORIDE, CALCIUM CHLORIDE 600; 310; 30; 20 MG/100ML; MG/100ML; MG/100ML; MG/100ML
INJECTION, SOLUTION INTRAVENOUS
Status: DISPENSED
Start: 2017-02-09

## (undated) RX ORDER — HEPARIN SODIUM 1000 [USP'U]/ML
INJECTION, SOLUTION INTRAVENOUS; SUBCUTANEOUS
Status: DISPENSED
Start: 2021-06-22

## (undated) RX ORDER — FENTANYL CITRATE 50 UG/ML
INJECTION, SOLUTION INTRAMUSCULAR; INTRAVENOUS
Status: DISPENSED
Start: 2018-03-06

## (undated) RX ORDER — LIDOCAINE HYDROCHLORIDE 20 MG/ML
INJECTION, SOLUTION EPIDURAL; INFILTRATION; INTRACAUDAL; PERINEURAL
Status: DISPENSED
Start: 2018-10-11

## (undated) RX ORDER — LIDOCAINE HYDROCHLORIDE 10 MG/ML
INJECTION, SOLUTION EPIDURAL; INFILTRATION; INTRACAUDAL; PERINEURAL
Status: DISPENSED
Start: 2021-06-22

## (undated) RX ORDER — FENTANYL CITRATE 50 UG/ML
INJECTION, SOLUTION INTRAMUSCULAR; INTRAVENOUS
Status: DISPENSED
Start: 2017-02-09

## (undated) RX ORDER — FENTANYL CITRATE 50 UG/ML
INJECTION, SOLUTION INTRAMUSCULAR; INTRAVENOUS
Status: DISPENSED
Start: 2018-10-08

## (undated) RX ORDER — ONDANSETRON 2 MG/ML
INJECTION INTRAMUSCULAR; INTRAVENOUS
Status: DISPENSED
Start: 2018-10-11

## (undated) RX ORDER — BACITRACIN 50000 [IU]/1
INJECTION, POWDER, FOR SOLUTION INTRAMUSCULAR
Status: DISPENSED
Start: 2017-02-12

## (undated) RX ORDER — CEFAZOLIN SODIUM 2 G/100ML
INJECTION, SOLUTION INTRAVENOUS
Status: DISPENSED
Start: 2018-10-11

## (undated) RX ORDER — NITROGLYCERIN 5 MG/ML
VIAL (ML) INTRAVENOUS
Status: DISPENSED
Start: 2021-06-22

## (undated) RX ORDER — REMIFENTANIL HYDROCHLORIDE 1 MG/ML
INJECTION, POWDER, LYOPHILIZED, FOR SOLUTION INTRAVENOUS
Status: DISPENSED
Start: 2017-02-12

## (undated) RX ORDER — FENTANYL CITRATE 50 UG/ML
INJECTION, SOLUTION INTRAMUSCULAR; INTRAVENOUS
Status: DISPENSED
Start: 2021-06-22

## (undated) RX ORDER — ONDANSETRON 2 MG/ML
INJECTION INTRAMUSCULAR; INTRAVENOUS
Status: DISPENSED
Start: 2018-10-08

## (undated) RX ORDER — PROPOFOL 10 MG/ML
INJECTION, EMULSION INTRAVENOUS
Status: DISPENSED
Start: 2018-10-08